# Patient Record
Sex: MALE | Race: WHITE | NOT HISPANIC OR LATINO | URBAN - METROPOLITAN AREA
[De-identification: names, ages, dates, MRNs, and addresses within clinical notes are randomized per-mention and may not be internally consistent; named-entity substitution may affect disease eponyms.]

---

## 2022-08-03 ENCOUNTER — INPATIENT (INPATIENT)
Facility: HOSPITAL | Age: 52
LOS: 3 days | Discharge: HOME | End: 2022-08-07
Attending: INTERNAL MEDICINE | Admitting: INTERNAL MEDICINE
Payer: COMMERCIAL

## 2022-08-03 VITALS
HEIGHT: 75 IN | SYSTOLIC BLOOD PRESSURE: 141 MMHG | RESPIRATION RATE: 16 BRPM | HEART RATE: 79 BPM | OXYGEN SATURATION: 100 % | TEMPERATURE: 98 F | DIASTOLIC BLOOD PRESSURE: 94 MMHG

## 2022-08-03 DIAGNOSIS — I63.9 CEREBRAL INFARCTION, UNSPECIFIED: ICD-10-CM

## 2022-08-03 LAB
ALBUMIN SERPL ELPH-MCNC: 4.2 G/DL — SIGNIFICANT CHANGE UP (ref 3.3–5)
ALP SERPL-CCNC: 72 U/L — SIGNIFICANT CHANGE UP (ref 40–120)
ALT FLD-CCNC: 30 U/L — SIGNIFICANT CHANGE UP (ref 4–41)
ANION GAP SERPL CALC-SCNC: 13 MMOL/L — SIGNIFICANT CHANGE UP (ref 7–14)
APTT BLD: 30.8 SEC — SIGNIFICANT CHANGE UP (ref 27–36.3)
AST SERPL-CCNC: 25 U/L — SIGNIFICANT CHANGE UP (ref 4–40)
BASOPHILS # BLD AUTO: 0.07 K/UL — SIGNIFICANT CHANGE UP (ref 0–0.2)
BASOPHILS NFR BLD AUTO: 0.6 % — SIGNIFICANT CHANGE UP (ref 0–2)
BILIRUB SERPL-MCNC: 0.5 MG/DL — SIGNIFICANT CHANGE UP (ref 0.2–1.2)
BLOOD GAS VENOUS COMPREHENSIVE RESULT: SIGNIFICANT CHANGE UP
BUN SERPL-MCNC: 18 MG/DL — SIGNIFICANT CHANGE UP (ref 7–23)
CALCIUM SERPL-MCNC: 8.6 MG/DL — SIGNIFICANT CHANGE UP (ref 8.4–10.5)
CHLORIDE SERPL-SCNC: 101 MMOL/L — SIGNIFICANT CHANGE UP (ref 98–107)
CK MB BLD-MCNC: 0.5 % — SIGNIFICANT CHANGE UP (ref 0–2.5)
CK MB CFR SERPL CALC: 2.9 NG/ML — SIGNIFICANT CHANGE UP
CK SERPL-CCNC: 601 U/L — HIGH (ref 30–200)
CO2 SERPL-SCNC: 22 MMOL/L — SIGNIFICANT CHANGE UP (ref 22–31)
CREAT SERPL-MCNC: 1.07 MG/DL — SIGNIFICANT CHANGE UP (ref 0.5–1.3)
EGFR: 84 ML/MIN/1.73M2 — SIGNIFICANT CHANGE UP
EOSINOPHIL # BLD AUTO: 0.21 K/UL — SIGNIFICANT CHANGE UP (ref 0–0.5)
EOSINOPHIL NFR BLD AUTO: 1.8 % — SIGNIFICANT CHANGE UP (ref 0–6)
FLUAV AG NPH QL: SIGNIFICANT CHANGE UP
FLUBV AG NPH QL: SIGNIFICANT CHANGE UP
GLUCOSE SERPL-MCNC: 138 MG/DL — HIGH (ref 70–99)
HCT VFR BLD CALC: 43.6 % — SIGNIFICANT CHANGE UP (ref 39–50)
HGB BLD-MCNC: 14.4 G/DL — SIGNIFICANT CHANGE UP (ref 13–17)
IANC: 8.48 K/UL — HIGH (ref 1.8–7.4)
IMM GRANULOCYTES NFR BLD AUTO: 0.7 % — SIGNIFICANT CHANGE UP (ref 0–1.5)
INR BLD: 1.05 RATIO — SIGNIFICANT CHANGE UP (ref 0.88–1.16)
LYMPHOCYTES # BLD AUTO: 1.6 K/UL — SIGNIFICANT CHANGE UP (ref 1–3.3)
LYMPHOCYTES # BLD AUTO: 14.1 % — SIGNIFICANT CHANGE UP (ref 13–44)
MCHC RBC-ENTMCNC: 28.7 PG — SIGNIFICANT CHANGE UP (ref 27–34)
MCHC RBC-ENTMCNC: 33 GM/DL — SIGNIFICANT CHANGE UP (ref 32–36)
MCV RBC AUTO: 86.9 FL — SIGNIFICANT CHANGE UP (ref 80–100)
MONOCYTES # BLD AUTO: 0.94 K/UL — HIGH (ref 0–0.9)
MONOCYTES NFR BLD AUTO: 8.3 % — SIGNIFICANT CHANGE UP (ref 2–14)
NEUTROPHILS # BLD AUTO: 8.48 K/UL — HIGH (ref 1.8–7.4)
NEUTROPHILS NFR BLD AUTO: 74.5 % — SIGNIFICANT CHANGE UP (ref 43–77)
NRBC # BLD: 0 /100 WBCS — SIGNIFICANT CHANGE UP
NRBC # FLD: 0 K/UL — SIGNIFICANT CHANGE UP
PLATELET # BLD AUTO: 193 K/UL — SIGNIFICANT CHANGE UP (ref 150–400)
POTASSIUM SERPL-MCNC: 3.5 MMOL/L — SIGNIFICANT CHANGE UP (ref 3.5–5.3)
POTASSIUM SERPL-SCNC: 3.5 MMOL/L — SIGNIFICANT CHANGE UP (ref 3.5–5.3)
PROT SERPL-MCNC: 7 G/DL — SIGNIFICANT CHANGE UP (ref 6–8.3)
PROTHROM AB SERPL-ACNC: 12.2 SEC — SIGNIFICANT CHANGE UP (ref 10.5–13.4)
RBC # BLD: 5.02 M/UL — SIGNIFICANT CHANGE UP (ref 4.2–5.8)
RBC # FLD: 12.8 % — SIGNIFICANT CHANGE UP (ref 10.3–14.5)
RSV RNA NPH QL NAA+NON-PROBE: SIGNIFICANT CHANGE UP
SARS-COV-2 RNA SPEC QL NAA+PROBE: SIGNIFICANT CHANGE UP
SODIUM SERPL-SCNC: 136 MMOL/L — SIGNIFICANT CHANGE UP (ref 135–145)
TOXICOLOGY SCREEN, DRUGS OF ABUSE, SERUM RESULT: SIGNIFICANT CHANGE UP
TROPONIN T, HIGH SENSITIVITY RESULT: 11 NG/L — SIGNIFICANT CHANGE UP
WBC # BLD: 11.38 K/UL — HIGH (ref 3.8–10.5)
WBC # FLD AUTO: 11.38 K/UL — HIGH (ref 3.8–10.5)

## 2022-08-03 PROCEDURE — 93010 ELECTROCARDIOGRAM REPORT: CPT

## 2022-08-03 PROCEDURE — 70498 CT ANGIOGRAPHY NECK: CPT | Mod: 26,MA

## 2022-08-03 PROCEDURE — 0042T: CPT

## 2022-08-03 PROCEDURE — 70496 CT ANGIOGRAPHY HEAD: CPT | Mod: 26,MA

## 2022-08-03 PROCEDURE — 99291 CRITICAL CARE FIRST HOUR: CPT | Mod: 25

## 2022-08-03 RX ORDER — SODIUM CHLORIDE 9 MG/ML
1000 INJECTION INTRAMUSCULAR; INTRAVENOUS; SUBCUTANEOUS ONCE
Refills: 0 | Status: COMPLETED | OUTPATIENT
Start: 2022-08-03 | End: 2022-08-03

## 2022-08-03 RX ORDER — ACETAMINOPHEN 500 MG
1000 TABLET ORAL ONCE
Refills: 0 | Status: COMPLETED | OUTPATIENT
Start: 2022-08-03 | End: 2022-08-03

## 2022-08-03 RX ORDER — ALTEPLASE 100 MG
8.8 KIT INTRAVENOUS ONCE
Refills: 0 | Status: COMPLETED | OUTPATIENT
Start: 2022-08-03 | End: 2022-08-03

## 2022-08-03 RX ORDER — METOCLOPRAMIDE HCL 10 MG
10 TABLET ORAL ONCE
Refills: 0 | Status: COMPLETED | OUTPATIENT
Start: 2022-08-03 | End: 2022-08-03

## 2022-08-03 RX ORDER — ONDANSETRON 8 MG/1
4 TABLET, FILM COATED ORAL ONCE
Refills: 0 | Status: DISCONTINUED | OUTPATIENT
Start: 2022-08-03 | End: 2022-08-03

## 2022-08-03 RX ORDER — ALTEPLASE 100 MG
78.9 KIT INTRAVENOUS ONCE
Refills: 0 | Status: COMPLETED | OUTPATIENT
Start: 2022-08-03 | End: 2022-08-03

## 2022-08-03 RX ADMIN — ALTEPLASE 528 MILLIGRAM(S): KIT at 22:56

## 2022-08-03 RX ADMIN — Medication 400 MILLIGRAM(S): at 23:08

## 2022-08-03 RX ADMIN — Medication 1000 MILLIGRAM(S): at 23:38

## 2022-08-03 RX ADMIN — Medication 10 MILLIGRAM(S): at 22:43

## 2022-08-03 RX ADMIN — ALTEPLASE 78.9 MILLIGRAM(S): KIT at 23:03

## 2022-08-03 RX ADMIN — SODIUM CHLORIDE 1000 MILLILITER(S): 9 INJECTION INTRAMUSCULAR; INTRAVENOUS; SUBCUTANEOUS at 22:43

## 2022-08-03 NOTE — STROKE CODE NOTE - ASSESSMENT/PLAN
Case discussed with MARY resident. 52M with h/o CVA 02/2022, septal defect, on Eliquis, last dose taken in AM with current INR 1.05.  LKN 9 p.m., L hand weakness, while playing piano ~8p.m.  Went back to baseline at 9 p.m.. In ED, symptoms recurred.  LKN 9p.m.  CT NEG, NIHSS2, but disabling speech.  tPA recommended if no exclusion criteria met.

## 2022-08-03 NOTE — ED ADULT NURSE NOTE - NSIMPLEMENTINTERV_GEN_ALL_ED
Implemented All Fall Risk Interventions:  [place] to call system. Call bell, personal items and telephone within reach. Instruct patient to call for assistance. Room bathroom lighting operational. Non-slip footwear when patient is off stretcher. Physically safe environment: no spills, clutter or unnecessary equipment. Stretcher in lowest position, wheels locked, appropriate side rails in place. Provide visual cue, wrist band, yellow gown, etc. Monitor gait and stability. Monitor for mental status changes and reorient to person, place, and time. Review medications for side effects contributing to fall risk. Reinforce activity limits and safety measures with patient and family.

## 2022-08-03 NOTE — ED PROVIDER NOTE - CRITICAL CARE ATTENDING CONTRIBUTION TO CARE
DR. PARKER, ATTENDING MD-  I personally saw the patient with the PA and performed a substantive portion of the visit including all aspects of the medical decision making.    51 y/o male h/o tia's with slow speech n/v lue weakness dec sensation x2 hours pta.  CODE CVA.  Obtain ct head cta h/n ct perf study neuro at bedside, tpa candidate.

## 2022-08-03 NOTE — ED ADULT NURSE REASSESSMENT NOTE - NS ED NURSE REASSESS COMMENT FT1
PT received TPA.  Initial dose given by Neuro Resident.  TPA infusion in place and tolerating well.  VSS.  No distress noted.  Neuro checks shows no acute changes.  IV Tylenol given for headache and tolerating well.  Will continue to monitor.

## 2022-08-03 NOTE — ED ADULT NURSE NOTE - OBJECTIVE STATEMENT
Facilitator RN" Pt coming to the ED as a Stroke Code. Pt A&Ox4 with episodes of confusion, ambulatory at baseline. Coming for AMS x 2 hours. As per family member, pt was at [employer] for music rehearsal, noticed pt acting out of the ordinary. [facility] patient was dropping thing and had a slurred speech. [facility] patient had expressive aphasia at baseline s/p CVA on February 2022. [facility] pt is on Eliquis and ASA. Upon assessment, pt actively vomiting, respirations are even and unlabored, neuro assessment as per flowsheet, pt able to follow commands, 18G IV LAC, labs sent, RVP collected, CLEMENTE Humphrey, Day, Neuro MD at CT area for evaluation. Safety precautions implemented as per protocol, awaiting further MD orders, will continue to monitor.

## 2022-08-03 NOTE — ED ADULT NURSE NOTE - CHIEF COMPLAINT QUOTE
pt p/w period of AMS x 2 hours, was at a rehearsal when others noticed he was dropping things and slurring speech. PMHx CVA in February 2022 on Eliquis and asa. baseline expressive aphasia, friend states pt speech is improved but still slower than usual

## 2022-08-03 NOTE — ED PROVIDER NOTE - NEUROLOGICAL, MLM
Alert and oriented, no focal deficits, no motor or sensory deficits. Alert and oriented.  LUE weakness dec sens

## 2022-08-03 NOTE — ED PROVIDER NOTE - NSICDXPASTMEDICALHX_GEN_ALL_CORE_FT
PAST MEDICAL HISTORY:  CVA (cerebrovascular accident)     History of TIAs     HLD (hyperlipidemia)

## 2022-08-03 NOTE — ED PROVIDER NOTE - CLINICAL SUMMARY MEDICAL DECISION MAKING FREE TEXT BOX
51 Y/O M PMH CVA and multiple TIA's, HLD, daytime tiredness on Adderall presents with an acute episode of confusion and poor coordination. Pt was doing a musical rehearsal at 8PM and was performing poorly and began to drop things and feel dizzy. Code stroke called, seen by Neuro, given TPA, will admit to the ICU for continued neuro checks, monitoring.

## 2022-08-03 NOTE — ED PROVIDER NOTE - OBJECTIVE STATEMENT
51 Y/O M PMH CVA and multiple TIA's, HLD, daytime tiredness on Adderall presents with an acute episode of confusion and poor coordination. Pt was doing a musical rehearsal at 8PM and was performing poorly and began to drop things and feel dizzy. Pt's wife came to see the pt and states he was confused picked up a bag of trash from the street thinking it was his belongings. Pt endorses a headache and nausea, denies any other sx or acute complaints.

## 2022-08-04 LAB
ALBUMIN SERPL ELPH-MCNC: 3.8 G/DL — SIGNIFICANT CHANGE UP (ref 3.3–5)
ALP SERPL-CCNC: 66 U/L — SIGNIFICANT CHANGE UP (ref 40–120)
ALT FLD-CCNC: 26 U/L — SIGNIFICANT CHANGE UP (ref 4–41)
ANION GAP SERPL CALC-SCNC: 13 MMOL/L — SIGNIFICANT CHANGE UP (ref 7–14)
APTT BLD: 27.3 SEC — SIGNIFICANT CHANGE UP (ref 27–36.3)
AST SERPL-CCNC: 26 U/L — SIGNIFICANT CHANGE UP (ref 4–40)
BASOPHILS # BLD AUTO: 0.04 K/UL — SIGNIFICANT CHANGE UP (ref 0–0.2)
BASOPHILS NFR BLD AUTO: 0.3 % — SIGNIFICANT CHANGE UP (ref 0–2)
BILIRUB SERPL-MCNC: 0.8 MG/DL — SIGNIFICANT CHANGE UP (ref 0.2–1.2)
BUN SERPL-MCNC: 13 MG/DL — SIGNIFICANT CHANGE UP (ref 7–23)
CALCIUM SERPL-MCNC: 8.4 MG/DL — SIGNIFICANT CHANGE UP (ref 8.4–10.5)
CHLORIDE SERPL-SCNC: 101 MMOL/L — SIGNIFICANT CHANGE UP (ref 98–107)
CO2 SERPL-SCNC: 21 MMOL/L — LOW (ref 22–31)
CREAT SERPL-MCNC: 0.88 MG/DL — SIGNIFICANT CHANGE UP (ref 0.5–1.3)
EGFR: 103 ML/MIN/1.73M2 — SIGNIFICANT CHANGE UP
EOSINOPHIL # BLD AUTO: 0.01 K/UL — SIGNIFICANT CHANGE UP (ref 0–0.5)
EOSINOPHIL NFR BLD AUTO: 0.1 % — SIGNIFICANT CHANGE UP (ref 0–6)
GLUCOSE SERPL-MCNC: 113 MG/DL — HIGH (ref 70–99)
HCT VFR BLD CALC: 39.7 % — SIGNIFICANT CHANGE UP (ref 39–50)
HGB BLD-MCNC: 13.5 G/DL — SIGNIFICANT CHANGE UP (ref 13–17)
IANC: 9.93 K/UL — HIGH (ref 1.8–7.4)
IMM GRANULOCYTES NFR BLD AUTO: 0.5 % — SIGNIFICANT CHANGE UP (ref 0–1.5)
INR BLD: 1.09 RATIO — SIGNIFICANT CHANGE UP (ref 0.88–1.16)
LYMPHOCYTES # BLD AUTO: 1.19 K/UL — SIGNIFICANT CHANGE UP (ref 1–3.3)
LYMPHOCYTES # BLD AUTO: 9.8 % — LOW (ref 13–44)
MAGNESIUM SERPL-MCNC: 1.9 MG/DL — SIGNIFICANT CHANGE UP (ref 1.6–2.6)
MCHC RBC-ENTMCNC: 28.8 PG — SIGNIFICANT CHANGE UP (ref 27–34)
MCHC RBC-ENTMCNC: 34 GM/DL — SIGNIFICANT CHANGE UP (ref 32–36)
MCV RBC AUTO: 84.8 FL — SIGNIFICANT CHANGE UP (ref 80–100)
MONOCYTES # BLD AUTO: 0.86 K/UL — SIGNIFICANT CHANGE UP (ref 0–0.9)
MONOCYTES NFR BLD AUTO: 7.1 % — SIGNIFICANT CHANGE UP (ref 2–14)
NEUTROPHILS # BLD AUTO: 9.93 K/UL — HIGH (ref 1.8–7.4)
NEUTROPHILS NFR BLD AUTO: 82.2 % — HIGH (ref 43–77)
NRBC # BLD: 0 /100 WBCS — SIGNIFICANT CHANGE UP
NRBC # FLD: 0 K/UL — SIGNIFICANT CHANGE UP
PCP SPEC-MCNC: SIGNIFICANT CHANGE UP
PHOSPHATE SERPL-MCNC: 3.8 MG/DL — SIGNIFICANT CHANGE UP (ref 2.5–4.5)
PLATELET # BLD AUTO: 167 K/UL — SIGNIFICANT CHANGE UP (ref 150–400)
POTASSIUM SERPL-MCNC: 3.4 MMOL/L — LOW (ref 3.5–5.3)
POTASSIUM SERPL-SCNC: 3.4 MMOL/L — LOW (ref 3.5–5.3)
PROT SERPL-MCNC: 6.6 G/DL — SIGNIFICANT CHANGE UP (ref 6–8.3)
PROTHROM AB SERPL-ACNC: 12.6 SEC — SIGNIFICANT CHANGE UP (ref 10.5–13.4)
RBC # BLD: 4.68 M/UL — SIGNIFICANT CHANGE UP (ref 4.2–5.8)
RBC # FLD: 13 % — SIGNIFICANT CHANGE UP (ref 10.3–14.5)
SODIUM SERPL-SCNC: 135 MMOL/L — SIGNIFICANT CHANGE UP (ref 135–145)
WBC # BLD: 12.09 K/UL — HIGH (ref 3.8–10.5)
WBC # FLD AUTO: 12.09 K/UL — HIGH (ref 3.8–10.5)

## 2022-08-04 PROCEDURE — 99292 CRITICAL CARE ADDL 30 MIN: CPT

## 2022-08-04 PROCEDURE — 70450 CT HEAD/BRAIN W/O DYE: CPT | Mod: 26,77

## 2022-08-04 PROCEDURE — 71045 X-RAY EXAM CHEST 1 VIEW: CPT | Mod: 26

## 2022-08-04 PROCEDURE — 99291 CRITICAL CARE FIRST HOUR: CPT | Mod: GC

## 2022-08-04 PROCEDURE — 93306 TTE W/DOPPLER COMPLETE: CPT | Mod: 26

## 2022-08-04 PROCEDURE — 70450 CT HEAD/BRAIN W/O DYE: CPT | Mod: 26

## 2022-08-04 RX ORDER — ASPIRIN/CALCIUM CARB/MAGNESIUM 324 MG
81 TABLET ORAL DAILY
Refills: 0 | Status: DISCONTINUED | OUTPATIENT
Start: 2022-08-04 | End: 2022-08-07

## 2022-08-04 RX ORDER — CHLORHEXIDINE GLUCONATE 213 G/1000ML
1 SOLUTION TOPICAL DAILY
Refills: 0 | Status: DISCONTINUED | OUTPATIENT
Start: 2022-08-04 | End: 2022-08-07

## 2022-08-04 RX ORDER — ATORVASTATIN CALCIUM 80 MG/1
80 TABLET, FILM COATED ORAL AT BEDTIME
Refills: 0 | Status: DISCONTINUED | OUTPATIENT
Start: 2022-08-04 | End: 2022-08-07

## 2022-08-04 RX ORDER — METOCLOPRAMIDE HCL 10 MG
10 TABLET ORAL ONCE
Refills: 0 | Status: COMPLETED | OUTPATIENT
Start: 2022-08-04 | End: 2022-08-04

## 2022-08-04 RX ORDER — POTASSIUM CHLORIDE 20 MEQ
10 PACKET (EA) ORAL
Refills: 0 | Status: COMPLETED | OUTPATIENT
Start: 2022-08-04 | End: 2022-08-04

## 2022-08-04 RX ORDER — ASPIRIN/CALCIUM CARB/MAGNESIUM 324 MG
81 TABLET ORAL DAILY
Refills: 0 | Status: DISCONTINUED | OUTPATIENT
Start: 2022-08-04 | End: 2022-08-04

## 2022-08-04 RX ORDER — ACETAMINOPHEN 500 MG
975 TABLET ORAL ONCE
Refills: 0 | Status: COMPLETED | OUTPATIENT
Start: 2022-08-04 | End: 2022-08-04

## 2022-08-04 RX ORDER — ACETAMINOPHEN 500 MG
1000 TABLET ORAL ONCE
Refills: 0 | Status: COMPLETED | OUTPATIENT
Start: 2022-08-04 | End: 2022-08-04

## 2022-08-04 RX ORDER — APIXABAN 2.5 MG/1
5 TABLET, FILM COATED ORAL EVERY 12 HOURS
Refills: 0 | Status: DISCONTINUED | OUTPATIENT
Start: 2022-08-05 | End: 2022-08-07

## 2022-08-04 RX ORDER — CHLORHEXIDINE GLUCONATE 213 G/1000ML
1 SOLUTION TOPICAL
Refills: 0 | Status: DISCONTINUED | OUTPATIENT
Start: 2022-08-04 | End: 2022-08-04

## 2022-08-04 RX ADMIN — Medication 975 MILLIGRAM(S): at 19:00

## 2022-08-04 RX ADMIN — Medication 1000 MILLIGRAM(S): at 10:00

## 2022-08-04 RX ADMIN — Medication 400 MILLIGRAM(S): at 09:18

## 2022-08-04 RX ADMIN — Medication 100 MILLIEQUIVALENT(S): at 13:42

## 2022-08-04 RX ADMIN — CHLORHEXIDINE GLUCONATE 1 APPLICATION(S): 213 SOLUTION TOPICAL at 12:37

## 2022-08-04 RX ADMIN — ATORVASTATIN CALCIUM 80 MILLIGRAM(S): 80 TABLET, FILM COATED ORAL at 21:19

## 2022-08-04 RX ADMIN — Medication 975 MILLIGRAM(S): at 18:30

## 2022-08-04 RX ADMIN — Medication 100 MILLIEQUIVALENT(S): at 14:22

## 2022-08-04 RX ADMIN — Medication 10 MILLIGRAM(S): at 02:57

## 2022-08-04 NOTE — PROGRESS NOTE ADULT - ATTENDING COMMENTS
Agree with above. Seen and examined with team on rounds. Critically ill post stroke and TPA. Unclear etiology of stroke, but has a history of ASD. Will repeat echo to evaluate the size of the ASD. Was on anticoagulation with Eliquis and ASA as an outpatient. MRI pending and repeat CT head pending. Speech and swallow eval at bedside. Hold off on DVT prophylaxis for 24 hours as last dose of Eliquis was last night. Neurology eval appreciated. Close follow up of headache and deficits.

## 2022-08-04 NOTE — CONSULT NOTE ADULT - ASSESSMENT
53yo L-handed man with a PMHx significant for CVA and TIAs (02/2022 s/p tPA, residual expressive aphasia), HLD, and a septal defect (on Eliquis) presents to the ED with difficulty using his L hand today. Neurology consulted as a code stroke. Symptoms onset 2000h, with resolution, and LKN 2100h with nausea, vomiting, headache, and worsening expressive aphasia.    In the ED code stroke was activated. NIHSS 2 (L hypoesthesia and aphasia). Pre-MRS 2. CTH showed no acute hemorrhage or pathology. CTA head/neck showed no LVO. CTP showed a core of 0ml and a R frontal penumbra of 7ml. tPA was administered at 1051h. Patient was not a candidate for thrombectomy due to no LVO.    Impression: Fluctuation of symptoms with weakness of the L hand, nausea, vomiting, headache, and L hypoesthesia due to R frontal ischemic CVA likely embolic in the setting of a septal defect.    Recommendations:  [] Admit to MICU  [] BP goal <180/105 per tpa protocol   [] Telemetry monitoring  [] MRI brain w/o contrast  [] CTH w/o contrast in 24hrs if unable to obtain MRI brain in time  [] TTE w/bubble. Pending results may need to consider MIMI.  [] Start ASA in 24 hours if repeat scans negative for hemorrhage  [] Atorvastatin 80mg (titrate to LDL < 70)  [] Mechanical DVT ppx. Start pharmacological DVT ppx in 24 hours if repeat scans negative for hemorrhage   [] Neuro checks and vital signs per tPA and unit protocol  [] PT/OT  [] S/S evaluation    Case discussed with telestroke attending Dr. Ramírez.  Case to be seen and discussed with stroke attending Dr. Castro. 53yo L-handed man with a PMHx significant for CVA and TIAs (02/2022 s/p tPA, residual expressive aphasia), HLD, and a septal defect (on Eliquis) presents to the ED with difficulty using his L hand today. Neurology consulted as a code stroke. Symptoms onset 2000h, with resolution, and LKN 2100h with nausea, vomiting, headache, and worsening expressive aphasia.    In the ED code stroke was activated. NIHSS 2 (L hypoesthesia and aphasia). Pre-MRS 2. CTH showed no acute hemorrhage or pathology. CTA head/neck showed no LVO. CTP showed a core of 0ml and a R frontal penumbra of 7ml. tPA was administered at 1051h. Patient was not a candidate for thrombectomy due to no LVO.    Impression: Fluctuation of symptoms with weakness of the L hand, nausea, vomiting, headache, and L hypoesthesia due to R frontal and parieto-occipital ischemic CVA likely embolic in the setting of a septal defect.    Recommendations:  [] Admit to MICU  [] BP goal <180/105 per tpa protocol   [] Telemetry monitoring  [] MRI brain w/o contrast  [] CTH w/o contrast in 24hrs if unable to obtain MRI brain in time  [] TTE w/bubble. Pending results may need to consider MIMI.  [] Start ASA in 24 hours if repeat scans negative for hemorrhage  [] Atorvastatin 80mg (titrate to LDL < 70)  [] Mechanical DVT ppx. Start pharmacological DVT ppx in 24 hours if repeat scans negative for hemorrhage   [] Neuro checks and vital signs per tPA and unit protocol  [] PT/OT  [] S/S evaluation    Case discussed with telestroke attending Dr. Ramírez.  Case to be seen and discussed with stroke attending Dr. Castro. 51yo L-handed man with a PMHx significant for CVA and TIAs (02/2022 s/p tPA, residual expressive aphasia), HLD, and a septal defect (on Eliquis) presents to the ED with difficulty using his L hand today. Neurology consulted as a code stroke. Symptoms onset 2000h, with resolution, and LKN 2100h with nausea, vomiting, headache, and worsening expressive aphasia.    In the ED code stroke was activated. NIHSS 2 (L hypoesthesia and aphasia). Pre-MRS 2. CTH showed no acute hemorrhage or pathology. CTA head/neck showed no LVO. CTP showed a core of 0ml, and a R frontal penumbra of 7ml and parieto-occipital of 4ml. tPA was administered at 1051h. Patient was not a candidate for thrombectomy due to no LVO.    Impression: Fluctuation of symptoms with weakness of the L hand, nausea, vomiting, headache, and L hypoesthesia due to R frontal and parieto-occipital ischemic CVA likely embolic in the setting of a septal defect.    Recommendations:  [] Admit to MICU  [] BP goal <180/105 per tpa protocol   [] Telemetry monitoring  [] MRI brain w/o contrast  [] CTH w/o contrast in 24hrs if unable to obtain MRI brain in time  [] TTE w/bubble. Pending results may need to consider MIMI.  [] Start ASA in 24 hours if repeat scans negative for hemorrhage  [] Atorvastatin 80mg (titrate to LDL < 70)  [] Mechanical DVT ppx. Start pharmacological DVT ppx in 24 hours if repeat scans negative for hemorrhage   [] Neuro checks and vital signs per tPA and unit protocol  [] PT/OT  [] S/S evaluation    Case discussed with telestroke attending Dr. Ramírez.  Case to be seen and discussed with stroke attending Dr. Castro.

## 2022-08-04 NOTE — H&P ADULT - HISTORY OF PRESENT ILLNESS
51yo PMH CVA and TIAs (02/06/2022 s/p tPA @ [place], residual expressive aphasia, ?L sided deficit now resolved), HLD, and a septal defect (on Eliquis) presents to the ED with difficulty using his L hand today.  At 2000h patient was at [employer] for a rehearsal playing the keyboard when he was noticed to be making mistakes. He then began having trouble using his L hand and was dropping items prompting people to call EMS. When EMS arrived around 2100h he was reportedly back to baseline. However, upon arrival to the hospital EMS noted a slowed nicanor in his speech and the patient began having nausea, vomiting, and a R-sided headache. He denies acute changes in vision, numbness, recent fever, illness, or fall.    In the ED code stroke was activated. NIHSS 2. Pre-MRS 2. CTH showed no acute hemorrhage or pathology. CTA head/neck showed no LVO. CTP showed a core of 0ml and a R frontal penumbra of 7ml and parieto-occipital of 4ml. Discussed risks and benefits of tPA with patient who verbalized understanding with repetition. Patient provided verbal consent and tPA was administered at 1051h. Patient was not a candidate for thrombectomy due to no LVO.

## 2022-08-04 NOTE — H&P ADULT - ASSESSMENT
#Neuro:  -  - Pain, analgesia, sedation  - Mobilization and activity    #CV:  -  - Shock, pressors, inotropes    #Resp:  -Saturating well on room air, no supplemental O2 requirements    #GI:  - Diet and Nutrition:  - Stooling:  - Stress ulcer ppx    #Renal:  -  - Fluids, fluid balance; IVF and UOP  - Electrolytes and imbalances, replacement  - Acid-base status and issues; on bicarb?    #Heme:  s/p tPA      #ID:  - Active  - Resolved  - Micro results: blood/urine/sputum cx, stains, assays (C diff, legionella)  - Abx: current, prior, planned duration    #Endo:  -  - Glucose control  - Adrenal insufficiency?    #Skin:  - Decub?    #Trauma/Surgery/MSK/Ortho:    #Access/Lines/Tubes/Drains:  -    #Social/Ethics:  - Code status    #Dispo:  -      52M PMH as above here for L sided weakness, hypoesthesia and dysarthria found to have weakness of the L hand, due to R frontal and parieto-occipital ischemic CVA likely embolic in the setting of a septal defect.      #Neuro:  R frontal and parieto-occipital ischemic CVA likely embolic in the setting of a septal defect  [] MRI brain w/o contrast  [] TTE w/bubble     [] Neuro checks q4h  [] PT/OT    #CV:  Known ASD  - TTE with bubble study  - Patient currently hemodynamically stable, will CTM and add on support as needed  - Atorvastatin 80mg (titrate to LDL < 70)    #Resp:  -Saturating well on room air, no supplemental O2 requirements    #GI:  - Diet and Nutrition: Pending S/S evaluation    #Renal:  - No active issues currently    #Heme:  s/p tPA  [ ] Mechanical DVT ppx. Start pharmacological DVT ppx in 24 hours,.  [ ] Start ASA in 24 hours if repeat scans negative for hemorrhage    #ID:  - No active issues    #Endo:  - No active issues    #Access/Lines/Tubes/Drains:  PIVs    #Social/Ethics:  - Code status: Full code    #Dispo:  -

## 2022-08-04 NOTE — PHYSICAL THERAPY INITIAL EVALUATION ADULT - PERTINENT HX OF CURRENT PROBLEM, REHAB EVAL
53yo PMH CVA and TIAs (02/06/2022 s/p tPA @ [facility], residual expressive aphasia, ?L sided deficit now resolved), HLD, and a septal defect (on Eliquis) presents to the ED with difficulty using his L hand today.  At 2000h patient was at [employer] for a rehearsal playing the keyboard when he was noticed to be making mistakes.

## 2022-08-04 NOTE — OCCUPATIONAL THERAPY INITIAL EVALUATION ADULT - ANTICIPATED DISCHARGE DISPOSITION, OT EVAL
TBD pending assessment of functional abilities when appropriate. Patient on bedrest s/p tPA. OT to continue to follow.

## 2022-08-04 NOTE — CONSULT NOTE ADULT - ATTENDING COMMENTS
VASCULAR NEUROLOGY ATTENDING  The patient is seen and examined the history and imaging are reviewed. I agree with the resident note unless otherwise noted. In brief this is a 52 year old man L-handed man with pior stroke (02/2022 s/p tPA, residual expressive aphasia), HLD, and a septal defect (on Eliquis). On 8/3/22 presents to the ED with difficulty using his L hand today. CTH showed no acute hemorrhage or pathology. CTA head/neck showed no LVO. CTP showed a core of 0ml, and a R frontal penumbra of 7ml and parieto-occipital of 4ml. tPA was administered at.  Patient was not a candidate for thrombectomy due to no LVO. On exam this AM patient appears grossly at his baseline. Will repeat CT head today given HA (present from prior). Post tpa care. MRI/A. Team to clarify indication for AC. Will continue for now once CT head and MRI are preformed. TTE. PT/OT/SS

## 2022-08-04 NOTE — PHYSICAL THERAPY INITIAL EVALUATION ADULT - GENERAL OBSERVATIONS, REHAB EVAL
Pt received in bed all lines intact NAD all precautions observed RN made aware, call bell left within reach, PT will continue to follow.

## 2022-08-04 NOTE — PATIENT PROFILE ADULT - FALL HARM RISK - HARM RISK INTERVENTIONS
Assistance with ambulation/Assistance OOB with selected safe patient handling equipment/Communicate Risk of Fall with Harm to all staff/Reinforce activity limits and safety measures with patient and family/Review medications for side effects contributing to fall risk/Sit up slowly, dangle for a short time, stand at bedside before walking/Tailored Fall Risk Interventions/Toileting schedule using arm’s reach rule for commode and bathroom/Visual Cue: Yellow wristband and red socks/Bed in lowest position, wheels locked, appropriate side rails in place/Call bell, personal items and telephone in reach/Instruct patient to call for assistance before getting out of bed or chair/Non-slip footwear when patient is out of bed/[place] to call system/Physically safe environment - no spills, clutter or unnecessary equipment/Purposeful Proactive Rounding/Room/bathroom lighting operational, light cord in reach

## 2022-08-04 NOTE — SWALLOW BEDSIDE ASSESSMENT ADULT - SWALLOW EVAL: DIAGNOSIS
1) Functional oral stage of swallow for regular solids, puree, and thin fluids marked by adequate oral containment, bolus manipulation, mastication, and coordination.  Anterior to posterior oral transit/transfer noted. Adequate oral clearance observed. 2) Functional pharyngeal stage of swallow for regular solids, puree, and thin fluids marked by initiation of pharyngeal swallow trigger and hyolaryngeal excursion noted upon digital palpation. No overt signs/symptoms of penetration/aspiration noted across consistencies.

## 2022-08-04 NOTE — PROGRESS NOTE ADULT - SUBJECTIVE AND OBJECTIVE BOX
Progress Note    08-03-22 (1d)    Patient is a 52y old  Male who presents with a chief complaint of     Subjective / Interval Events :  - Arrived as stroke code s/p TPA 8/3 ~11pm, admitted to MICU for q1h neuro checks  - Pt seen and examined at bedside this AM. Still reporting HA- states it's the same HA that he came in with, prior to TPA. No acute worsening  - Repeat CTH obtained. IV tylenol for pain     Additional ROS (if any):    MEDICATIONS  (STANDING):  atorvastatin 80 milliGRAM(s) Oral at bedtime  chlorhexidine 2% Cloths 1 Application(s) Topical daily    MEDICATIONS  (PRN):      CAPILLARY BLOOD GLUCOSE      POCT Blood Glucose.: 128 mg/dL (03 Aug 2022 22:02)    I&O's Summary    03 Aug 2022 07:01  -  04 Aug 2022 07:00  --------------------------------------------------------  IN: 0 mL / OUT: 400 mL / NET: -400 mL    04 Aug 2022 07:01  -  04 Aug 2022 11:44  --------------------------------------------------------  IN: 0 mL / OUT: 400 mL / NET: -400 mL        PHYSICAL EXAM:  Vital Signs Last 24 Hrs  T(C): 37.1 (04 Aug 2022 08:00), Max: 37.6 (04 Aug 2022 02:14)  T(F): 98.8 (04 Aug 2022 08:00), Max: 99.7 (04 Aug 2022 04:00)  HR: 74 (04 Aug 2022 11:00) (69 - 97)  BP: 128/78 (04 Aug 2022 11:00) (105/63 - 157/91)  BP(mean): 91 (04 Aug 2022 11:00) (73 - 102)  RR: 21 (04 Aug 2022 11:00) (14 - 28)  SpO2: 96% (04 Aug 2022 11:00) (94% - 100%)    Parameters below as of 04 Aug 2022 07:00  Patient On (Oxygen Delivery Method): room air      General: NAD, non-toxic appearing   HEENT: EOMi, no scleral icterus  CV: RRR, normal S1 and S2, no m/r/g  Lungs: normal respiratory effort. CTAB  Abd: soft, nontender, nondistended  Ext: no edema, 2+ peripheral pulses   Pysch: AAOx3, appropriate affect  Neuro: grossly non-focal, moving all extremities spontaneously. speech mildly slowed but intelligible    Skin: no rashes or lesions     LABS:                          13.5   12.09 )-----------( 167      ( 04 Aug 2022 06:10 )             39.7       WBC Trend: 12.09<--, 11.38<--  Hb Trend: 13.5<--, 14.4<--    08-04    135  |  101  |  13  ----------------------------<  113<H>  3.4<L>   |  21<L>  |  0.88    Ca    8.4      04 Aug 2022 06:10  Phos  3.8     08-04  Mg     1.90     08-04    TPro  6.6  /  Alb  3.8  /  TBili  0.8  /  DBili  x   /  AST  26  /  ALT  26  /  AlkPhos  66  08-04    PT/INR - ( 04 Aug 2022 06:10 )   PT: 12.6 sec;   INR: 1.09 ratio         PTT - ( 04 Aug 2022 06:10 )  PTT:27.3 sec  CARDIAC MARKERS ( 03 Aug 2022 22:17 )  x     / x     / 601 U/L / x     / 2.9 ng/mL          Blood Gas Venous Comprehensive Result: Performed In Lab (08-03-22 @ 22:17)      RADIOLOGY & ADDITIONAL TESTS: Reviewed  < from: CT Head No Cont (08.04.22 @ 11:29) >    ACC: [id number] EXAM:  CT BRAIN                          PROCEDURE DATE:  08/04/2022          INTERPRETATION:  Clinical indication: Status post TPA.    Multiple axial sections were performed from base skull to vertex without   contrast enhancement. Coronal and sagittal reconstructions were performed   as well    This exam is compared prior head CT performed on August 3, 2022.    Parenchymal volume loss and chronic microvascular ischemic changes again   seen.    Stable area of calcification involving the central portion alexis is   identified.    There is no acute hemorrhage mass or mass effect seen.    Evaluation of the osseous structures with the appropriate window appears   unremarkable    Bilateral ethmoid sinus mucosal thickening is seen. Air-fluid levels   involving both frontal sinuses are seen.    IMPRESSION: Stable exam.    --- End of Report ---            TA DANIEL MD; Attending Radiologist  This document has been electronically signed. Aug  4 2022 11:43AM    < end of copied text >

## 2022-08-04 NOTE — H&P ADULT - NSHPLABSRESULTS_GEN_ALL_CORE
LABS:                         13.5   12.09 )-----------( 167      ( 04 Aug 2022 06:10 )             39.7     08-03    136  |  101  |  18  ----------------------------<  138<H>  3.5   |  22  |  1.07    Ca    8.6      03 Aug 2022 22:17    TPro  7.0  /  Alb  4.2  /  TBili  0.5  /  DBili  x   /  AST  25  /  ALT  30  /  AlkPhos  72  08-03    PT/INR - ( 04 Aug 2022 06:10 )   PT: 12.6 sec;   INR: 1.09 ratio         PTT - ( 04 Aug 2022 06:10 )  PTT:27.3 sec    CARDIAC MARKERS ( 03 Aug 2022 22:17 )  x     / x     / 601 U/L / x     / 2.9 ng/mL            RADIOLOGY, EKG & ADDITIONAL TESTS:

## 2022-08-04 NOTE — PHYSICAL THERAPY INITIAL EVALUATION ADULT - ADDITIONAL COMMENTS
Fully independent prior to admission to hospital.  No history of falls.  Pt lives in an apartment building with a staircase to get to his apartment.  Pt does not use DME

## 2022-08-04 NOTE — OCCUPATIONAL THERAPY INITIAL EVALUATION ADULT - DIAGNOSIS, OT EVAL
s/p left sided weakness, s/p aphasia, s/p tPA, s/p right frontal and parieto-occipital CVA; decreased functional mobility, decreased ADL performance

## 2022-08-04 NOTE — H&P ADULT - NSHPPHYSICALEXAM_GEN_ALL_CORE
Gen: WDWN, NAD, sleepy  HEENT: EOMI, no nasal discharge, mucous membranes moist  CV: RRR, +S1/S2, no M/R/G  Resp: CTAB, no W/R/R  GI: Abdomen soft non-distended, NTTP, no masses  MSK: No open wounds, no bruising, no LE edema  Neuro: CN2-12 grossly intact however exam slightly limited as patient is tired, A&Ox4, MS +5/5 in UE and LE b/l however slightly diminished in the L compared to the right, finger to nose smooth and rapid, gross sensation intact in UE and LE BL, slight L sided pronator drift  Psych: appropriate mood, denies AH, VH, SI

## 2022-08-04 NOTE — OCCUPATIONAL THERAPY INITIAL EVALUATION ADULT - GENERAL OBSERVATIONS, REHAB EVAL
Patient received semisupine in bed in NAD and agreeable to participate in OT evaluation. +tele. A&Ox4. Patient currently on bedrest s/p tPA administration.

## 2022-08-04 NOTE — H&P ADULT - ATTENDING COMMENTS
53 yo man with h/o septal defect on Eliquis, TIAs and CVA (got tPA 2/2022, residual expressive aphasia) presented with h/o waxing and waning sxs of L sided weakness and dysarthria/worsened expressive aphasia.  CT head showed R frontal penumbra, no hemorrhage.  Pt coags wnl.  He was given tPA at 0105 today.  /91  Afeb  HR 95  100% on RA  Pt is awake, alert, in NAD, oriented x3, follows simple commands, some L hand weakness and diminished sensation on L.  Likely R frontal CVA, likely embolic given septal defect.  Will need DVT study, TTE  q1h neuro checks, repeat CT 24hours or sooner for change in neuro exam.  Keep BP<180/105  Atorvastatin and ASA per neuro recs  DVT ppx SCD's for now  Not in shock state  GOC full code

## 2022-08-04 NOTE — SWALLOW BEDSIDE ASSESSMENT ADULT - COMMENTS
Per MICU note 8/3 "52M PMH multiple CVA and TIAs on ASA and Eliquis (last 02/06/2022 s/p tPA @ [facility], residual expressive aphasia, ?L sided deficit now resolved), HLD, and a known atrial septal defect here for L sided weakness, hypoesthesia and aphasia found to have weakness of the L hand, due to R frontal and parieto-occipital ischemic CVA s/p TPA ~23:00 8/3, admitted to MICU for q1 neuro checks.   Neuro:  #R frontal and parieto-occipital ischemic CVA  h/o multiple CVA's and TIA's iso known ASD. Concern for embolic etiology, however, per pt follows private cardiologist, deemed ASD too small. On ASA and Eliquis at home   - s/p TPA ~23:00 8/3".    CXR 8/4 "Clear lungs."    Patient seen at bedside, awake/alert and oriented, during clinical swallow evaluation this PM. Patient able to follow directions and answer questions appropriately. Patient denied difficulties swallowing and denied odynophagia. Patient was cooperative and accepted all PO trials.

## 2022-08-04 NOTE — PHYSICAL THERAPY INITIAL EVALUATION ADULT - LEVEL OF INDEPENDENCE: SIT/STAND, REHAB EVAL
Pt currently on bedrest orders (Recent administration of tPA).  Pt appears competent to ambulate and perform functional activity/unable to perform

## 2022-08-04 NOTE — SWALLOW BEDSIDE ASSESSMENT ADULT - ADDITIONAL RECOMMENDATIONS
1) Monitor for any neurological changes that may impact PO intake. 2) Reconsult this service as needed.

## 2022-08-04 NOTE — OCCUPATIONAL THERAPY INITIAL EVALUATION ADULT - PERTINENT HX OF CURRENT PROBLEM, REHAB EVAL
52 year old male with history of multiple CVA and TIAs (last 02/06/2022 s/p tPA @ [facility], residual expressive aphasia, ?L sided deficit now resolved), HLD, and a known atrial septal defect here for left sided weakness, hypoesthesia and aphasia\ due to right frontal and parieto-occipital ischemic CVA s/p TPA ~23:00 on 8/3, admitted to MICU for q1 neuro checks.

## 2022-08-04 NOTE — PROGRESS NOTE ADULT - ASSESSMENT
52M PMH as above here for L sided weakness, hypoesthesia and dysarthria found to have weakness of the L hand, due to R frontal and parieto-occipital ischemic CVA likely embolic in the setting of a septal defect.      #Neuro:  R frontal and parieto-occipital ischemic CVA likely embolic in the setting of a septal defect  [] MRI brain w/o contrast  [] TTE w/bubble     [] Neuro checks q4h  [] PT/OT    #CV:  Known ASD  - TTE with bubble study  - Patient currently hemodynamically stable, will CTM and add on support as needed  - Atorvastatin 80mg (titrate to LDL < 70)    #Resp:  -Saturating well on room air, no supplemental O2 requirements    #GI:  - Diet and Nutrition: Pending S/S evaluation    #Renal:  - No active issues currently    #Heme:  s/p tPA  [ ] Mechanical DVT ppx. Start pharmacological DVT ppx in 24 hours,.  [ ] Start ASA in 24 hours if repeat scans negative for hemorrhage    #ID:  - No active issues    #Endo:  - No active issues    #Access/Lines/Tubes/Drains:  PIVs    #Social/Ethics:  - Code status: Full code    #Dispo:  -      52M PMH multiple CVA and TIAs on ASA and Eliquis (last 02/06/2022 s/p tPA @ [facility], residual expressive aphasia, ?L sided deficit now resolved), HLD, and a known atrial septal defect here for L sided weakness, hypoesthesia and aphasia found to have weakness of the L hand, due to R frontal and parieto-occipital ischemic CVA s/p TPA ~23:00 8/3, admitted to MICU for q1 neuro checks.     Neuro:  #R frontal and parieto-occipital ischemic CVA  h/o multiple CVA's and TIA's iso known ASD. Concern for embolic etiology, however, per pt follows private cardiologist, deemed ASD too small. On ASA and Eliquis at home   - s/p TPA ~23:00 8/3  - repeat CTH was performed 11:11 8/4 for HA (stable from presentation/prior to TPA) - stable   - repeat CTH 24hrs post TPA  - q1h neuro checks  - pending MRI brain w/o contrast  - pending TTE w/ bubble   - PT/OT/Speech & swallow   - appreciate neuro recs     CV:  #Known ASD  Follows outpatient cardiologist, deemed too small to be cause of previous multiple CVA/TIAs, on ASA and Eliquis   - TTE with bubble study  - atorvastatin 80mg  - cardiology c/s after TTE   - hold antiplatelets/AC until 24h repeat CTH     Resp:  - No active issues. Saturating well on room air, no supplemental O2 requirements    GI:  #Diet and Nutrition: Pending S/S evaluation    Renal:  - No active issues currently. SCr wnl    Heme:  - no active issues. H/H stable  #DVT ppx: home Eliquis and ASA held given post-TPA. resume after 24h if repeat CTH stable     ID:  - No active issues    Endo:  - No active issues    Access/Lines/Tubes/Drains:  - PIVs    Social/Ethics:  - Code status: Full code    DISPO:  - transfer to telemetry after 24hrs post-TPA of q1h neuro checks

## 2022-08-04 NOTE — CONSULT NOTE ADULT - SUBJECTIVE AND OBJECTIVE BOX
Neurology - Consult Note    -  Spectra: [phone] ([facility]), [phone] ([facility])  -    HPI: Patient TAMI DUNHAM is a 51yo L-handed man with a PMHx significant for CVA and TIAs (02/2022 s/p tPA, residual expressive aphasia), HLD, and a septal defect (on Eliquis) presents to the ED with difficulty using his L hand today. Neurology consulted as a code stroke. At 2000h patient was at [employer] for a rehearsal playing the keyboard when he was noticed to be making mistakes. He then began having trouble using his L hand and was dropping items prompting people to call EMS. When EMS arrived around 2100h he was reportedly back to baseline. However, upon arrival to the hospital EMS noted a slowed nicanor in his speech and the patient began having nausea, vomiting, and a R-sided headache. He denies acute changes in vision, numbness, recent fever, illness, or fall.    In the ED code stroke was activated. NIHSS 2. Pre-MRS 2. CTH showed no acute hemorrhage or pathology. CTA head/neck showed no LVO. CTP showed a core of 0ml and a R frontal penumbra of 7ml. Discussed risks and benefits of tPA with patient who verbalized understanding with repetition. Patient provided verbal consent and tPA was administered at 1051h. Patient was not a candidate for thrombectomy due to no LVO.      Review of Systems:  CONSTITUTIONAL: No fevers or chills  EYES AND ENT: No visual changes or no throat pain   NECK: No pain or stiffness  RESPIRATORY: No hemoptysis or shortness of breath  CARDIOVASCULAR: No chest pain or palpitations  GASTROINTESTINAL: +Nausea and vomiting  GENITOURINARY: No dysuria or hematuria  NEUROLOGICAL: +As stated in HPI above  All other review of systems is negative unless indicated above.      PMHx/PSHx/Family Hx: As above, otherwise see below   History of TIAs    CVA (cerebrovascular accident)    HLD (hyperlipidemia)      Vitals:  T(C): 37.1 (08-03-22 @ 23:50), Max: 37.1 (08-03-22 @ 23:50)  HR: 84 (08-03-22 @ 23:50) (77 - 84)  BP: 129/80 (08-03-22 @ 23:50) (129/80 - 150/91)  RR: 17 (08-03-22 @ 23:50) (16 - 18)  SpO2: 99% (08-03-22 @ 23:50) (97% - 100%)    Physical Examination:  General - NAD  Cardiovascular - Peripheral pulses palpable, no edema  Eyes - Fundoscopy not performed due to safety precautions in the setting of the COVID-19 pandemic    Neurologic Exam:  Mental status - Awake, Alert, Oriented to person, place, and time (August 2022). Speech occasionally with pauses and word searching, impaired repetition and naming intact. Follows simple commands but occasionally requires repetition to accurately perform tasks, able to perform cross command. Chronic memory deficits    Cranial nerves - PERRLA, VFF, EOMI, face sensation (V1-V3) intact b/l, facial strength intact without asymmetry b/l, hearing intact b/l, palate with symmetric elevation, trapezius OR sternocleidomastiod 5/5 strength b/l, tongue midline on protrusion with full lateral movement    Motor - Moving all extremities antigravity against resistance with no motor drift. Normal bulk and tone throughout. Clumsy L hand but no sustained pronator drift.    Sensation - Diminished to light touch throughout the L extremities, intact to pinprick. Intact to light touch and pinprick throughout R extremities.    DTR's -             Biceps      Triceps     Brachioradialis      Patellar    Ankle    Toes/plantar response  R             3+             3+                  3+                       3+            3+                 Equivocal  L              3+             3+                 3+                        3+           3+                 Equivocal    Coordination - Finger to Nose intact b/l albeit with slight difficulty following command on the L. No tremors appreciated    Gait and station - Deferred due to repeated ongoing emesis    Labs:                        14.4   11.38 )-----------( 193      ( 03 Aug 2022 22:17 )             43.6     08-03    136  |  101  |  18  ----------------------------<  138<H>  3.5   |  22  |  1.07    Ca    8.6      03 Aug 2022 22:17    TPro  7.0  /  Alb  4.2  /  TBili  0.5  /  DBili  x   /  AST  25  /  ALT  30  /  AlkPhos  72  08-03    CAPILLARY BLOOD GLUCOSE  POCT Blood Glucose.: 128 mg/dL (03 Aug 2022 22:02)    LIVER FUNCTIONS - ( 03 Aug 2022 22:17 )  Alb: 4.2 g/dL / Pro: 7.0 g/dL / ALK PHOS: 72 U/L / ALT: 30 U/L / AST: 25 U/L / GGT: x             PT/INR - ( 03 Aug 2022 22:17 )   PT: 12.2 sec;   INR: 1.05 ratio    PTT - ( 03 Aug 2022 22:17 )  PTT:30.8 sec        Radiology:  CT Angio Neck w/ IV Cont (08.03.22 @ 22:35)  IMPRESSION:    CT PERFUSION: No evidence of core infarct. Focal hypoperfusion 7 cc in   the right frontal region, 4 cc in the right parieto-occipital region.    CTA BRAIN:Mild venous contamination. Question adjacent vein versus   mildly irregular early M2 branch at the superior aspect of the right M1   midsegment. No proximal large vessel occlusion or high-grade stenosis.    CTA NECK: Patent cervical vasculature. No flow limiting stenosis or   occlusion. Neurology - Consult Note    -  Spectra: [phone] ([facility]), [phone] ([facility])  -    HPI: Patient TAMI DUNHAM is a 51yo L-handed man with a PMHx significant for CVA and TIAs (02/2022 s/p tPA, residual expressive aphasia), HLD, and a septal defect (on Eliquis) presents to the ED with difficulty using his L hand today. Neurology consulted as a code stroke. At 2000h patient was at [employer] for a rehearsal playing the keyboard when he was noticed to be making mistakes. He then began having trouble using his L hand and was dropping items prompting people to call EMS. When EMS arrived around 2100h he was reportedly back to baseline. However, upon arrival to the hospital EMS noted a slowed nicanor in his speech and the patient began having nausea, vomiting, and a R-sided headache. He denies acute changes in vision, numbness, recent fever, illness, or fall.    In the ED code stroke was activated. NIHSS 2. Pre-MRS 2. CTH showed no acute hemorrhage or pathology. CTA head/neck showed no LVO. CTP showed a core of 0ml and a R frontal penumbra of 7ml. Discussed risks and benefits of tPA with patient who verbalized understanding with repetition. Patient provided verbal consent and tPA was administered at 1051h. Patient was not a candidate for thrombectomy due to no LVO.      Review of Systems:  CONSTITUTIONAL: No fevers or chills  EYES AND ENT: No visual changes or no throat pain   NECK: No pain or stiffness  RESPIRATORY: No hemoptysis or shortness of breath  CARDIOVASCULAR: No chest pain or palpitations  GASTROINTESTINAL: +Nausea and vomiting  GENITOURINARY: No dysuria or hematuria  NEUROLOGICAL: +As stated in HPI above  All other review of systems is negative unless indicated above.      PMHx/PSHx/Family Hx: As above, otherwise see below   History of TIAs    CVA (cerebrovascular accident)    HLD (hyperlipidemia)      Vitals:  T(C): 37.1 (08-03-22 @ 23:50), Max: 37.1 (08-03-22 @ 23:50)  HR: 84 (08-03-22 @ 23:50) (77 - 84)  BP: 129/80 (08-03-22 @ 23:50) (129/80 - 150/91)  RR: 17 (08-03-22 @ 23:50) (16 - 18)  SpO2: 99% (08-03-22 @ 23:50) (97% - 100%)    Physical Examination:  General - NAD  Cardiovascular - Peripheral pulses palpable, no edema  Eyes - Fundoscopy not performed due to safety precautions in the setting of the COVID-19 pandemic    Neurologic Exam:  Mental status - Awake, Alert, Oriented to person, place, and time (August 2022). Speech occasionally with pauses and word searching, impaired repetition and naming intact. Follows simple commands but occasionally requires repetition to accurately perform tasks, able to perform cross command. Chronic memory deficits    Cranial nerves - PERRLA, VFF, EOMI, face sensation (V1-V3) intact b/l, facial strength intact without asymmetry b/l, hearing intact b/l, palate with symmetric elevation, trapezius OR sternocleidomastiod 5/5 strength b/l, tongue midline on protrusion with full lateral movement    Motor - Moving all extremities antigravity against resistance with no motor drift. Normal bulk and tone throughout. Clumsy L hand but no sustained pronator drift.    Sensation - Diminished to light touch throughout the L extremities, intact to pinprick. Intact to light touch and pinprick throughout R extremities.    DTR's -             Biceps      Triceps     Brachioradialis      Patellar    Ankle    Toes/plantar response  R             3+             3+                  3+                       3+            3+                 Equivocal  L              3+             3+                 3+                        3+           3+                 Equivocal    Coordination - Finger to Nose intact b/l albeit with slight difficulty following command on the L. No tremors appreciated    Gait and station - Deferred due to repeated ongoing emesis    Labs:                        14.4   11.38 )-----------( 193      ( 03 Aug 2022 22:17 )             43.6     08-03    136  |  101  |  18  ----------------------------<  138<H>  3.5   |  22  |  1.07    Ca    8.6      03 Aug 2022 22:17    TPro  7.0  /  Alb  4.2  /  TBili  0.5  /  DBili  x   /  AST  25  /  ALT  30  /  AlkPhos  72  08-03    CAPILLARY BLOOD GLUCOSE  POCT Blood Glucose.: 128 mg/dL (03 Aug 2022 22:02)    LIVER FUNCTIONS - ( 03 Aug 2022 22:17 )  Alb: 4.2 g/dL / Pro: 7.0 g/dL / ALK PHOS: 72 U/L / ALT: 30 U/L / AST: 25 U/L / GGT: x             PT/INR - ( 03 Aug 2022 22:17 )   PT: 12.2 sec;   INR: 1.05 ratio    PTT - ( 03 Aug 2022 22:17 )  PTT:30.8 sec        Radiology:  CT Brain Stroke Protocol (08.03.22 @ 22:34)  IMPRESSION:    No CT evidence of acute intracranial hemorrhage, brain edema,or mass   effect.      CT Angio Neck w/ IV Cont (08.03.22 @ 22:35)  IMPRESSION:    CT PERFUSION: No evidence of core infarct. Focal hypoperfusion 7 cc in   the right frontal region, 4 cc in the right parieto-occipital region.    CTA BRAIN:Mild venous contamination. Question adjacent vein versus   mildly irregular early M2 branch at the superior aspect of the right M1   midsegment. No proximal large vessel occlusion or high-grade stenosis.    CTA NECK: Patent cervical vasculature. No flow limiting stenosis or   occlusion. Neurology - Consult Note    -  Spectra: [phone] ([facility]), [phone] ([facility])  -    HPI: Patient TAMI DUNHAM is a 53yo L-handed man with a PMHx significant for CVA and TIAs (02/2022 s/p tPA, residual expressive aphasia), HLD, and a septal defect (on Eliquis) presents to the ED with difficulty using his L hand today. Neurology consulted as a code stroke. At 2000h patient was at [employer] for a rehearsal playing the keyboard when he was noticed to be making mistakes. He then began having trouble using his L hand and was dropping items prompting people to call EMS. When EMS arrived around 2100h he was reportedly back to baseline. However, upon arrival to the hospital EMS noted a slowed nicanor in his speech and the patient began having nausea, vomiting, and a R-sided headache. He denies acute changes in vision, numbness, recent fever, illness, or fall.    In the ED code stroke was activated. NIHSS 2. Pre-MRS 2. CTH showed no acute hemorrhage or pathology. CTA head/neck showed no LVO. CTP showed a core of 0ml and a R frontal penumbra of 7ml and parieto-occipital of 4ml. Discussed risks and benefits of tPA with patient who verbalized understanding with repetition. Patient provided verbal consent and tPA was administered at 1051h. Patient was not a candidate for thrombectomy due to no LVO.      Review of Systems:  CONSTITUTIONAL: No fevers or chills  EYES AND ENT: No visual changes or no throat pain   NECK: No pain or stiffness  RESPIRATORY: No hemoptysis or shortness of breath  CARDIOVASCULAR: No chest pain or palpitations  GASTROINTESTINAL: +Nausea and vomiting  GENITOURINARY: No dysuria or hematuria  NEUROLOGICAL: +As stated in HPI above  All other review of systems is negative unless indicated above.      PMHx/PSHx/Family Hx: As above, otherwise see below   History of TIAs    CVA (cerebrovascular accident)    HLD (hyperlipidemia)      Vitals:  T(C): 37.1 (08-03-22 @ 23:50), Max: 37.1 (08-03-22 @ 23:50)  HR: 84 (08-03-22 @ 23:50) (77 - 84)  BP: 129/80 (08-03-22 @ 23:50) (129/80 - 150/91)  RR: 17 (08-03-22 @ 23:50) (16 - 18)  SpO2: 99% (08-03-22 @ 23:50) (97% - 100%)    Physical Examination:  General - NAD  Cardiovascular - Peripheral pulses palpable, no edema  Eyes - Fundoscopy not performed due to safety precautions in the setting of the COVID-19 pandemic    Neurologic Exam:  Mental status - Awake, Alert, Oriented to person, place, and time (August 2022). Speech occasionally with pauses and word searching, impaired repetition and naming intact. Follows simple commands but occasionally requires repetition to accurately perform tasks, able to perform cross command. Chronic memory deficits    Cranial nerves - PERRLA, VFF, EOMI, face sensation (V1-V3) intact b/l, facial strength intact without asymmetry b/l, hearing intact b/l, palate with symmetric elevation, trapezius OR sternocleidomastiod 5/5 strength b/l, tongue midline on protrusion with full lateral movement    Motor - Moving all extremities antigravity against resistance with no motor drift. Normal bulk and tone throughout. Clumsy L hand but no sustained pronator drift.    Sensation - Diminished to light touch throughout the L extremities, intact to pinprick. Intact to light touch and pinprick throughout R extremities.    DTR's -             Biceps      Triceps     Brachioradialis      Patellar    Ankle    Toes/plantar response  R             3+             3+                  3+                       3+            3+                 Equivocal  L              3+             3+                 3+                        3+           3+                 Equivocal    Coordination - Finger to Nose intact b/l albeit with slight difficulty following command on the L. No tremors appreciated    Gait and station - Deferred due to repeated ongoing emesis    Labs:                        14.4   11.38 )-----------( 193      ( 03 Aug 2022 22:17 )             43.6     08-03    136  |  101  |  18  ----------------------------<  138<H>  3.5   |  22  |  1.07    Ca    8.6      03 Aug 2022 22:17    TPro  7.0  /  Alb  4.2  /  TBili  0.5  /  DBili  x   /  AST  25  /  ALT  30  /  AlkPhos  72  08-03    CAPILLARY BLOOD GLUCOSE  POCT Blood Glucose.: 128 mg/dL (03 Aug 2022 22:02)    LIVER FUNCTIONS - ( 03 Aug 2022 22:17 )  Alb: 4.2 g/dL / Pro: 7.0 g/dL / ALK PHOS: 72 U/L / ALT: 30 U/L / AST: 25 U/L / GGT: x             PT/INR - ( 03 Aug 2022 22:17 )   PT: 12.2 sec;   INR: 1.05 ratio    PTT - ( 03 Aug 2022 22:17 )  PTT:30.8 sec        Radiology:  CT Brain Stroke Protocol (08.03.22 @ 22:34)  IMPRESSION:    No CT evidence of acute intracranial hemorrhage, brain edema,or mass   effect.      CT Angio Neck w/ IV Cont (08.03.22 @ 22:35)  IMPRESSION:    CT PERFUSION: No evidence of core infarct. Focal hypoperfusion 7 cc in   the right frontal region, 4 cc in the right parieto-occipital region.    CTA BRAIN:Mild venous contamination. Question adjacent vein versus   mildly irregular early M2 branch at the superior aspect of the right M1   midsegment. No proximal large vessel occlusion or high-grade stenosis.    CTA NECK: Patent cervical vasculature. No flow limiting stenosis or   occlusion.

## 2022-08-05 DIAGNOSIS — I63.9 CEREBRAL INFARCTION, UNSPECIFIED: ICD-10-CM

## 2022-08-05 DIAGNOSIS — Z92.82 STATUS POST ADMINISTRATION OF TPA (RTPA) IN A DIFFERENT FACILITY WITHIN THE LAST 24 HOURS PRIOR TO ADMISSION TO CURRENT FACILITY: ICD-10-CM

## 2022-08-05 LAB
ALBUMIN SERPL ELPH-MCNC: 3.8 G/DL — SIGNIFICANT CHANGE UP (ref 3.3–5)
ALP SERPL-CCNC: 66 U/L — SIGNIFICANT CHANGE UP (ref 40–120)
ALT FLD-CCNC: 25 U/L — SIGNIFICANT CHANGE UP (ref 4–41)
ANION GAP SERPL CALC-SCNC: 9 MMOL/L — SIGNIFICANT CHANGE UP (ref 7–14)
APTT BLD: 30.1 SEC — SIGNIFICANT CHANGE UP (ref 27–36.3)
AST SERPL-CCNC: 33 U/L — SIGNIFICANT CHANGE UP (ref 4–40)
BASOPHILS # BLD AUTO: 0.06 K/UL — SIGNIFICANT CHANGE UP (ref 0–0.2)
BASOPHILS NFR BLD AUTO: 1 % — SIGNIFICANT CHANGE UP (ref 0–2)
BILIRUB SERPL-MCNC: 0.6 MG/DL — SIGNIFICANT CHANGE UP (ref 0.2–1.2)
BUN SERPL-MCNC: 12 MG/DL — SIGNIFICANT CHANGE UP (ref 7–23)
CALCIUM SERPL-MCNC: 8.7 MG/DL — SIGNIFICANT CHANGE UP (ref 8.4–10.5)
CHLORIDE SERPL-SCNC: 109 MMOL/L — HIGH (ref 98–107)
CO2 SERPL-SCNC: 24 MMOL/L — SIGNIFICANT CHANGE UP (ref 22–31)
CREAT SERPL-MCNC: 0.96 MG/DL — SIGNIFICANT CHANGE UP (ref 0.5–1.3)
EGFR: 95 ML/MIN/1.73M2 — SIGNIFICANT CHANGE UP
EOSINOPHIL # BLD AUTO: 0.39 K/UL — SIGNIFICANT CHANGE UP (ref 0–0.5)
EOSINOPHIL NFR BLD AUTO: 6.7 % — HIGH (ref 0–6)
GLUCOSE SERPL-MCNC: 105 MG/DL — HIGH (ref 70–99)
HCT VFR BLD CALC: 42 % — SIGNIFICANT CHANGE UP (ref 39–50)
HGB BLD-MCNC: 14.1 G/DL — SIGNIFICANT CHANGE UP (ref 13–17)
IANC: 3.02 K/UL — SIGNIFICANT CHANGE UP (ref 1.8–7.4)
IMM GRANULOCYTES NFR BLD AUTO: 0.5 % — SIGNIFICANT CHANGE UP (ref 0–1.5)
INR BLD: 0.96 RATIO — SIGNIFICANT CHANGE UP (ref 0.88–1.16)
LYMPHOCYTES # BLD AUTO: 1.54 K/UL — SIGNIFICANT CHANGE UP (ref 1–3.3)
LYMPHOCYTES # BLD AUTO: 26.6 % — SIGNIFICANT CHANGE UP (ref 13–44)
MAGNESIUM SERPL-MCNC: 2.3 MG/DL — SIGNIFICANT CHANGE UP (ref 1.6–2.6)
MCHC RBC-ENTMCNC: 29.4 PG — SIGNIFICANT CHANGE UP (ref 27–34)
MCHC RBC-ENTMCNC: 33.6 GM/DL — SIGNIFICANT CHANGE UP (ref 32–36)
MCV RBC AUTO: 87.7 FL — SIGNIFICANT CHANGE UP (ref 80–100)
MONOCYTES # BLD AUTO: 0.76 K/UL — SIGNIFICANT CHANGE UP (ref 0–0.9)
MONOCYTES NFR BLD AUTO: 13.1 % — SIGNIFICANT CHANGE UP (ref 2–14)
NEUTROPHILS # BLD AUTO: 3.02 K/UL — SIGNIFICANT CHANGE UP (ref 1.8–7.4)
NEUTROPHILS NFR BLD AUTO: 52.1 % — SIGNIFICANT CHANGE UP (ref 43–77)
NRBC # BLD: 0 /100 WBCS — SIGNIFICANT CHANGE UP
NRBC # FLD: 0 K/UL — SIGNIFICANT CHANGE UP
PHOSPHATE SERPL-MCNC: 3.7 MG/DL — SIGNIFICANT CHANGE UP (ref 2.5–4.5)
PLATELET # BLD AUTO: 165 K/UL — SIGNIFICANT CHANGE UP (ref 150–400)
POTASSIUM SERPL-MCNC: 3.9 MMOL/L — SIGNIFICANT CHANGE UP (ref 3.5–5.3)
POTASSIUM SERPL-SCNC: 3.9 MMOL/L — SIGNIFICANT CHANGE UP (ref 3.5–5.3)
PROT SERPL-MCNC: 6.9 G/DL — SIGNIFICANT CHANGE UP (ref 6–8.3)
PROTHROM AB SERPL-ACNC: 11.1 SEC — SIGNIFICANT CHANGE UP (ref 10.5–13.4)
RBC # BLD: 4.79 M/UL — SIGNIFICANT CHANGE UP (ref 4.2–5.8)
RBC # FLD: 13.2 % — SIGNIFICANT CHANGE UP (ref 10.3–14.5)
SODIUM SERPL-SCNC: 142 MMOL/L — SIGNIFICANT CHANGE UP (ref 135–145)
WBC # BLD: 5.8 K/UL — SIGNIFICANT CHANGE UP (ref 3.8–10.5)
WBC # FLD AUTO: 5.8 K/UL — SIGNIFICANT CHANGE UP (ref 3.8–10.5)

## 2022-08-05 PROCEDURE — 70551 MRI BRAIN STEM W/O DYE: CPT | Mod: 26

## 2022-08-05 PROCEDURE — 99291 CRITICAL CARE FIRST HOUR: CPT | Mod: GC

## 2022-08-05 RX ORDER — KETOROLAC TROMETHAMINE 30 MG/ML
15 SYRINGE (ML) INJECTION ONCE
Refills: 0 | Status: DISCONTINUED | OUTPATIENT
Start: 2022-08-05 | End: 2022-08-05

## 2022-08-05 RX ORDER — ACETAMINOPHEN 500 MG
975 TABLET ORAL ONCE
Refills: 0 | Status: COMPLETED | OUTPATIENT
Start: 2022-08-05 | End: 2022-08-05

## 2022-08-05 RX ORDER — ACETAMINOPHEN 500 MG
650 TABLET ORAL EVERY 6 HOURS
Refills: 0 | Status: DISCONTINUED | OUTPATIENT
Start: 2022-08-05 | End: 2022-08-07

## 2022-08-05 RX ADMIN — Medication 15 MILLIGRAM(S): at 21:26

## 2022-08-05 RX ADMIN — Medication 975 MILLIGRAM(S): at 08:58

## 2022-08-05 RX ADMIN — CHLORHEXIDINE GLUCONATE 1 APPLICATION(S): 213 SOLUTION TOPICAL at 14:32

## 2022-08-05 RX ADMIN — Medication 81 MILLIGRAM(S): at 16:10

## 2022-08-05 RX ADMIN — Medication 975 MILLIGRAM(S): at 09:58

## 2022-08-05 RX ADMIN — APIXABAN 5 MILLIGRAM(S): 2.5 TABLET, FILM COATED ORAL at 06:28

## 2022-08-05 RX ADMIN — ATORVASTATIN CALCIUM 80 MILLIGRAM(S): 80 TABLET, FILM COATED ORAL at 21:26

## 2022-08-05 RX ADMIN — APIXABAN 5 MILLIGRAM(S): 2.5 TABLET, FILM COATED ORAL at 18:36

## 2022-08-05 RX ADMIN — Medication 650 MILLIGRAM(S): at 16:53

## 2022-08-05 RX ADMIN — Medication 650 MILLIGRAM(S): at 17:23

## 2022-08-05 NOTE — PROGRESS NOTE ADULT - SUBJECTIVE AND OBJECTIVE BOX
Progress Note    08-03-22 (2d)    Patient is a 52y old  Male who presents with a chief complaint of     Subjective / Interval Events :  - No acute overnight events  - 24h post-TPA CTH was obtained around 22:45, appeared stable and pt was resumed on home ASA and Eliquis.  - Pt seen and examined at bedside this AM, no complaints. States headaches is significantly improved, 3/10 posterior, radiating down head.     Additional ROS (if any):    MEDICATIONS  (STANDING):  apixaban 5 milliGRAM(s) Oral every 12 hours  aspirin enteric coated 81 milliGRAM(s) Oral daily  atorvastatin 80 milliGRAM(s) Oral at bedtime  chlorhexidine 2% Cloths 1 Application(s) Topical daily    MEDICATIONS  (PRN):    CAPILLARY BLOOD GLUCOSE      I&O's Summary    04 Aug 2022 07:01  -  05 Aug 2022 07:00  --------------------------------------------------------  IN: 600 mL / OUT: 2100 mL / NET: -1500 mL        PHYSICAL EXAM:  Vital Signs Last 24 Hrs  T(C): 36.5 (05 Aug 2022 07:42), Max: 37.1 (04 Aug 2022 08:00)  T(F): 97.7 (05 Aug 2022 07:42), Max: 98.8 (04 Aug 2022 08:00)  HR: 48 (05 Aug 2022 07:00) (48 - 93)  BP: 124/78 (05 Aug 2022 07:00) (103/61 - 153/100)  BP(mean): 89 (05 Aug 2022 07:00) (70 - 113)  RR: 20 (05 Aug 2022 07:00) (8 - 28)  SpO2: 96% (05 Aug 2022 07:00) (94% - 100%)    Parameters below as of 05 Aug 2022 06:00  Patient On (Oxygen Delivery Method): room air      General: NAD, non-toxic appearing, lying down comfortably   HEENT: PERRL, EOMi, no scleral icterus  CV: RRR, normal S1 and S2, no m/r/g  Lungs: normal respiratory effort. CTAB, no wheezes, rales, or rhonchi  Abd: soft, nontender, nondistended  Ext: no edema, 2+ peripheral pulses   Pysch: AAOx3, appropriate affect   Neuro: grossly non-focal, moving all extremities spontaneously   Skin: no rashes or lesions     LABS:                          14.1   x     )-----------( 165      ( 05 Aug 2022 06:52 )             42.0       WBC Trend: 12.09<--, 11.38<--  Hb Trend: 14.1<--, 13.5<--, 14.4<--    08-04    135  |  101  |  13  ----------------------------<  113<H>  3.4<L>   |  21<L>  |  0.88    Ca    8.4      04 Aug 2022 06:10  Phos  3.8     08-04  Mg     1.90     08-04    TPro  6.6  /  Alb  3.8  /  TBili  0.8  /  DBili  x   /  AST  26  /  ALT  26  /  AlkPhos  66  08-04    PT/INR - ( 05 Aug 2022 06:52 )   PT: 11.1 sec;   INR: 0.96 ratio         PTT - ( 05 Aug 2022 06:52 )  PTT:30.1 sec  CARDIAC MARKERS ( 03 Aug 2022 22:17 )  x     / x     / 601 U/L / x     / 2.9 ng/mL      RADIOLOGY & ADDITIONAL TESTS: Reviewed  - Repeat CT 22:45 8/4 appears stable on gross read, pending official report

## 2022-08-05 NOTE — PROGRESS NOTE ADULT - SUBJECTIVE AND OBJECTIVE BOX
*************************************  NEUROLOGY PROGRESS NOTE  **************************************    TAMI DUNHAM  Male  [id number]    Subjective: Pt seen at bedside. No overnight events. Pt stated that he is still having HA (7/10 at max intensity) however tylenol is improving pain. No other complaints and pt denied any new weakness, numbness/tingling, lightheadedness. Pt stated that his L hand is back to baseline. mentation improved back to baseline.       VITAL SIGNS:  Vital Signs Last 24 Hrs  T(C): 36.5 (05 Aug 2022 07:42), Max: 36.9 (04 Aug 2022 19:48)  T(F): 97.7 (05 Aug 2022 07:42), Max: 98.4 (04 Aug 2022 19:48)  HR: 64 (05 Aug 2022 10:00) (48 - 84)  BP: 129/73 (05 Aug 2022 10:00) (103/61 - 153/100)  BP(mean): 85 (05 Aug 2022 10:00) (70 - 113)  RR: 15 (05 Aug 2022 10:00) (8 - 25)  SpO2: 96% (05 Aug 2022 10:00) (95% - 100%)    Parameters below as of 05 Aug 2022 08:00  Patient On (Oxygen Delivery Method): room air    PHYSICAL EXAMINATION:  General: Well-developed, well nourished, in no acute distress.  Eyes: Conjunctiva and sclera clear.  Neck: supple, nontender, good ROM  Lungs: no respiratory distress  Neurologic:  - Mental Status:  Alert, awake, oriented to person, place, and time; Speech is fluent with intact naming, repetition, and comprehension  - Cranial Nerves II-XII:  VFF, No nystagmus or APD noted, EOMI, PERRLA, V1-V3 intact, no facial asymmetry, t/p midline, SCM/trap intact.  - Motor:  Strength is 5/5 throughout.  There is no pronator drift.  Normal muscle bulk and tone throughout..  - Sensory:  Intact to light touch sense throughout.  - Coordination:  Finger-nose-finger and heel-knee-shin intact without dysmetria.  Rapid alternating hand movements intact.  - Gait:   Normal steps, base, arm swing, and turning.  Heel and toe walking are normal.  Tandem gait is normal.  Romberg testing is negative.    LABS:                          14.1   5.80  )-----------( 165      ( 05 Aug 2022 06:52 )             42.0     08-05    142  |  109<H>  |  12  ----------------------------<  105<H>  3.9   |  24  |  0.96    Ca    8.7      05 Aug 2022 06:52  Phos  3.7     08-05  Mg     2.30     08-05    TPro  6.9  /  Alb  3.8  /  TBili  0.6  /  DBili  x   /  AST  33  /  ALT  25  /  AlkPhos  66  08-05    PT/INR - ( 05 Aug 2022 06:52 )   PT: 11.1 sec;   INR: 0.96 ratio         PTT - ( 05 Aug 2022 06:52 )  PTT:30.1 sec      RADIOLOGY & ADDITIONAL STUDIES:      < from: CT Head No Cont (08.04.22 @ 22:47) >  IMPRESSION:  No significant change from the prior exam.    No mass effect or hemorrhage.    < end of copied text >

## 2022-08-05 NOTE — PROGRESS NOTE ADULT - ATTENDING COMMENTS
Patient is a 53 yo M w/ hjstory of reported septal defect (on Eliquis), prior TIAs and CVA (prior tPA 2/2022 with residual expressive aphasia) who was admitted on 8/3 after p/w L sided weakness and dysarthria/worsened expressive aphasia. Code stroke was called with CTH perfusion showing R frontal penumbra. Patient was given TPA and admitted to MICU for close monitoring.    #CVA s/p TPA. Serial CTH stable with no hemmorhage. Endorses improvement to close to baseline in neurological symptoms. PT evaluation with no needs. OT to make final recs. Passed Speech and Swallow. TTE normal with no evidence for PFO. No events on tele thus far, patient also has loop recorder  - f/u MRI brain  - f/u neuro if can d/c to home if MRI is OK  - f/ut OT recs  - Eliquis and ASA resumed  - Neuro checks  - Monitor on tele    Oliverio Uribe MD  Pulmonary & Critical Care

## 2022-08-05 NOTE — PROGRESS NOTE ADULT - ASSESSMENT
51yo L-handed man with a PMHx significant for CVA and TIAs (02/2022 s/p tPA, residual expressive aphasia), HLD, and a septal defect (on Eliquis) presents to the ED with difficulty using his L hand, nausea, vomiting, headache, and worsening expressive aphasia.  CTA head/neck showed no LVO. CTP showed a core of 0ml, and a R frontal penumbra of 7ml and parieto-occipital of 4ml. tPA was administered at 1051h. Patient was not a candidate for thrombectomy due to no LVO. Pt now back to baseline.  Of note, pt stated that he did not consistently take his eliquis prior to arrival at the hospital. Reported missing multiple evening doses over the past few days-weeks.     Impression: improvement of symptoms with weakness of the L hand, nausea, vomiting, headache, and L hypoesthesia due to R frontal and parieto-occipital ischemic CVA likely embolic in the setting of a septal defect. MRI pending. new ischemic event possibly due to missed eliquis doses.     Recommendations:   [] F/U MRI w/o contrast  [] MIMI may be indicated in the inpatient setting depending on MRI results  [] c/w Asa 81, statin (titrate to LDL < 70)  [] allow for normotension at this time   [] c/w eliquis bid 5 mg, pt educated on importance of not missing future doses   [] f/u OT recs    Case discussed with stroke fellow Dr. Arreguin and attending Dr. Castro.

## 2022-08-05 NOTE — CHART NOTE - NSCHARTNOTEFT_GEN_A_CORE
MICU Transfer Note  ---------------------------    Transfer from: MICU  Transfer to:  (  ) Medicine    (  ) Telemetry    (  ) RCU    (  ) Palliative    (  ) Stroke Unit    (  ) _______________  [facility]        OBJECTIVE --  Vital Signs Last 24 Hrs  T(C): 36.5 (05 Aug 2022 07:42), Max: 37.1 (04 Aug 2022 08:00)  T(F): 97.7 (05 Aug 2022 07:42), Max: 98.8 (04 Aug 2022 08:00)  HR: 48 (05 Aug 2022 07:00) (48 - 93)  BP: 124/78 (05 Aug 2022 07:00) (103/61 - 153/100)  BP(mean): 89 (05 Aug 2022 07:00) (70 - 113)  RR: 20 (05 Aug 2022 07:00) (8 - 28)  SpO2: 96% (05 Aug 2022 07:00) (94% - 100%)    Parameters below as of 05 Aug 2022 06:00  Patient On (Oxygen Delivery Method): room air        I&O's Summary    04 Aug 2022 07:01  -  05 Aug 2022 07:00  --------------------------------------------------------  IN: 600 mL / OUT: 2100 mL / NET: -1500 mL        MEDICATIONS  (STANDING):  apixaban 5 milliGRAM(s) Oral every 12 hours  aspirin enteric coated 81 milliGRAM(s) Oral daily  atorvastatin 80 milliGRAM(s) Oral at bedtime  chlorhexidine 2% Cloths 1 Application(s) Topical daily    MEDICATIONS  (PRN):        LABS                                            14.1                  Neurophils% (auto):   x      (08-05 @ 06:52):    x    )-----------(165          Lymphocytes% (auto):  x                                             42.0                   Eosinphils% (auto):   x        Manual%: Neutrophils x    ; Lymphocytes x    ; Eosinophils x    ; Bands%: x    ; Blasts x                                    142    |  109    |  12                  Calcium: 8.7   / iCa: x      (08-05 @ 06:52)    ----------------------------<  105       Magnesium: 2.30                             3.9     |  24     |  0.96             Phosphorous: 3.7      TPro  6.9    /  Alb  3.8    /  TBili  0.6    /  DBili  x      /  AST  33     /  ALT  25     /  AlkPhos  66     05 Aug 2022 06:52    ( 08-05 @ 06:52 )   PT: 11.1 sec;   INR: 0.96 ratio  aPTT: 30.1 sec          ASSESSMENT & PLAN:         For Follow-Up:  [ ]   [ ] MICU Transfer Note  ---------------------------    Transfer from: MICU  Transfer to:  (  ) Medicine    (  ) Telemetry    (  ) RCU    (  ) Palliative    (  ) Stroke Unit    (  ) _______________  Accepting Physician:      HPI / MICU COURSE  51yo PMH CVA and TIAs (02/06/2022 s/p tPA @ [facility], residual expressive aphasia, ?L sided deficit now resolved), HLD, and a septal defect (on Eliquis) presents to the ED with difficulty using his L hand today.  At 2000h patient was at [employer] for a rehearsal playing the keyboard when he was noticed to be making mistakes. He then began having trouble using his L hand and was dropping items prompting people to call EMS. When EMS arrived around 2100h he was reportedly back to baseline. However, upon arrival to the hospital EMS noted a slowed nicanor in his speech and the patient began having nausea, vomiting, and a R-sided headache. He denies acute changes in vision, numbness, recent fever, illness, or fall.    In the ED code stroke was activated. NIHSS 2. Pre-MRS 2. CTH showed no acute hemorrhage or pathology. CTA head/neck showed no LVO. CTP showed a core of 0ml and a R frontal penumbra of 7ml and parieto-occipital of 4ml. Discussed risks and benefits of tPA with patient who verbalized understanding with repetition. Patient provided verbal consent and tPA was administered at 1051h. Patient was not a candidate for thrombectomy due to no LVO.    Pt received TPA around ~23:00 8/3 and was admitted to MICU for q1 neuro check monitoring. Pt continued to have a HA during admission, stable from prior to TPA but a repeat CTH was performed, stable as prior. His 24h post TPA  CTH was a    OBJECTIVE --  Vital Signs Last 24 Hrs  T(C): 36.5 (05 Aug 2022 07:42), Max: 37.1 (04 Aug 2022 08:00)  T(F): 97.7 (05 Aug 2022 07:42), Max: 98.8 (04 Aug 2022 08:00)  HR: 48 (05 Aug 2022 07:00) (48 - 93)  BP: 124/78 (05 Aug 2022 07:00) (103/61 - 153/100)  BP(mean): 89 (05 Aug 2022 07:00) (70 - 113)  RR: 20 (05 Aug 2022 07:00) (8 - 28)  SpO2: 96% (05 Aug 2022 07:00) (94% - 100%)    Parameters below as of 05 Aug 2022 06:00  Patient On (Oxygen Delivery Method): room air      I&O's Summary    04 Aug 2022 07:01  -  05 Aug 2022 07:00  --------------------------------------------------------  IN: 600 mL / OUT: 2100 mL / NET: -1500 mL    General: NAD, non-toxic appearing, lying down comfortably   HEENT: PERRL, EOMi, no scleral icterus  CV: RRR, normal S1 and S2, no m/r/g  Lungs: normal respiratory effort. CTAB, no wheezes, rales, or rhonchi  Abd: soft, nontender, nondistended  Ext: no edema, 2+ peripheral pulses   Pysch: AAOx3, appropriate affect   Neuro: grossly non-focal, moving all extremities spontaneously   Skin: no rashes or lesions       MEDICATIONS  (STANDING):  apixaban 5 milliGRAM(s) Oral every 12 hours  aspirin enteric coated 81 milliGRAM(s) Oral daily  atorvastatin 80 milliGRAM(s) Oral at bedtime  chlorhexidine 2% Cloths 1 Application(s) Topical daily    MEDICATIONS  (PRN):        LABS                                            14.1                  Neurophils% (auto):   x      (08-05 @ 06:52):    x    )-----------(165          Lymphocytes% (auto):  x                                             42.0                   Eosinphils% (auto):   x        Manual%: Neutrophils x    ; Lymphocytes x    ; Eosinophils x    ; Bands%: x    ; Blasts x                                    142    |  109    |  12                  Calcium: 8.7   / iCa: x      (08-05 @ 06:52)    ----------------------------<  105       Magnesium: 2.30                             3.9     |  24     |  0.96             Phosphorous: 3.7      TPro  6.9    /  Alb  3.8    /  TBili  0.6    /  DBili  x      /  AST  33     /  ALT  25     /  AlkPhos  66     05 Aug 2022 06:52    ( 08-05 @ 06:52 )   PT: 11.1 sec;   INR: 0.96 ratio  aPTT: 30.1 sec          ASSESSMENT & PLAN:         For Follow-Up:  [ ]   [ ] MICU Transfer Note  ---------------------------    Transfer from: MICU  Transfer to:  (  ) Medicine    ( X) Telemetry    (  ) RCU    (  ) Palliative    (  ) Stroke Unit    (  ) _______________  Accepting Physician:      HPI / MICU COURSE  53yo PMH CVA and TIAs (02/06/2022 s/p tPA @ [facility], residual expressive aphasia, ?L sided deficit now resolved), HLD, and a septal defect (on Eliquis) presents to the ED with difficulty using his L hand today.  At 2000h patient was at [employer] for a rehearsal playing the keyboard when he was noticed to be making mistakes. He then began having trouble using his L hand and was dropping items prompting people to call EMS. When EMS arrived around 2100h he was reportedly back to baseline. However, upon arrival to the hospital EMS noted a slowed nicanor in his speech and the patient began having nausea, vomiting, and a R-sided headache. He denies acute changes in vision, numbness, recent fever, illness, or fall.    In the ED code stroke was activated. NIHSS 2. Pre-MRS 2. CTH showed no acute hemorrhage or pathology. CTA head/neck showed no LVO. CTP showed a core of 0ml and a R frontal penumbra of 7ml and parieto-occipital of 4ml. Discussed risks and benefits of tPA with patient who verbalized understanding with repetition. Patient provided verbal consent and tPA was administered at 1051h. Patient was not a candidate for thrombectomy due to no LVO.    Pt received TPA around ~23:00 8/3 and was admitted to MICU for q1 neuro check monitoring. Pt continued to have a HA during admission, stable from prior to TPA but a repeat CTH was performed, stable as prior. His 24h post TPA CTH was performed and stable. Now resumed on home Eliquis and ASA, which were recently prescribed during last CVA in 2/2022 given history of multiple CVA/TIAs. PT/OT/Speech following. Pt is now stable for transfer for further monitoring on telemetry. Pending MR brain.     OBJECTIVE --  Vital Signs Last 24 Hrs  T(C): 36.5 (05 Aug 2022 07:42), Max: 37.1 (04 Aug 2022 08:00)  T(F): 97.7 (05 Aug 2022 07:42), Max: 98.8 (04 Aug 2022 08:00)  HR: 48 (05 Aug 2022 07:00) (48 - 93)  BP: 124/78 (05 Aug 2022 07:00) (103/61 - 153/100)  BP(mean): 89 (05 Aug 2022 07:00) (70 - 113)  RR: 20 (05 Aug 2022 07:00) (8 - 28)  SpO2: 96% (05 Aug 2022 07:00) (94% - 100%)    Parameters below as of 05 Aug 2022 06:00  Patient On (Oxygen Delivery Method): room air      I&O's Summary    04 Aug 2022 07:01  -  05 Aug 2022 07:00  --------------------------------------------------------  IN: 600 mL / OUT: 2100 mL / NET: -1500 mL    General: NAD, non-toxic appearing, lying down comfortably   HEENT: PERRL, EOMi, no scleral icterus  CV: RRR, normal S1 and S2, no m/r/g  Lungs: normal respiratory effort. CTAB, no wheezes, rales, or rhonchi  Abd: soft, nontender, nondistended  Ext: no edema, 2+ peripheral pulses   Pysch: AAOx3, appropriate affect   Neuro: grossly non-focal, moving all extremities spontaneously   Skin: no rashes or lesions       MEDICATIONS  (STANDING):  apixaban 5 milliGRAM(s) Oral every 12 hours  aspirin enteric coated 81 milliGRAM(s) Oral daily  atorvastatin 80 milliGRAM(s) Oral at bedtime  chlorhexidine 2% Cloths 1 Application(s) Topical daily    MEDICATIONS  (PRN):        LABS                                            14.1                  Neurophils% (auto):   x      (08-05 @ 06:52):    x    )-----------(165          Lymphocytes% (auto):  x                                             42.0                   Eosinphils% (auto):   x        Manual%: Neutrophils x    ; Lymphocytes x    ; Eosinophils x    ; Bands%: x    ; Blasts x                                    142    |  109    |  12                  Calcium: 8.7   / iCa: x      (08-05 @ 06:52)    ----------------------------<  105       Magnesium: 2.30                             3.9     |  24     |  0.96             Phosphorous: 3.7      TPro  6.9    /  Alb  3.8    /  TBili  0.6    /  DBili  x      /  AST  33     /  ALT  25     /  AlkPhos  66     05 Aug 2022 06:52    ( 08-05 @ 06:52 )   PT: 11.1 sec;   INR: 0.96 ratio  aPTT: 30.1 sec          ASSESSMENT & PLAN:   52M PMH multiple CVA and TIAs on ASA and Eliquis (last 02/06/2022 s/p tPA @ [facility], residual expressive aphasia, ?L sided deficit now resolved), HLD, and a known atrial septal defect here for L sided weakness, hypoesthesia and aphasia found to have weakness of the L hand, due to R frontal and parieto-occipital ischemic CVA s/p TPA ~23:00 8/3, admitted to MICU for q1 neuro checks. Repeat 24h CTH stable.     Neuro:  #R frontal and parieto-occipital ischemic CVA  h/o multiple CVA's and TIA's iso known ASD. Concern for embolic etiology, however, per pt follows private cardiologist, deemed ASD too small. On ASA and Eliquis at home   - s/p TPA ~23:00 8/3  - repeat CTH was performed 11:11 8/4 for HA (stable from presentation/prior to TPA) - stable   - repeat CTH 24hrs post TPA - stable on prelim read   - home Eliquis and ASA resumed   - high dose statin   - TTE w/ bubble wnl EF 60%, no obvious PFO demonstrated   - pending MRI brain w/o contrast  - PT/OT/Speech & swallow   - appreciate neuro recs     CV:  #Known ASD  Follows outpatient cardiologist, deemed too small to be cause of previous multiple CVA/TIAs, on ASA and Eliquis   - TTE with bubble study did not demonstrate PFO   - atorvastatin 80mg  - antiplatelet/AC resumed     Resp:  - No active issues. Saturating well on room air, no supplemental O2 requirements    GI:  #Diet and Nutrition: regular     Renal:  - No active issues currently. SCr wnl    Heme:  - no active issues. H/H stable  #DVT ppx: home Eliquis 5mg BID (rx'ed for h/o multiple CVA/TIAs)    ID:  - No active issues    Endo:  - No active issues    Access/Lines/Tubes/Drains:  - PIVs    Social/Ethics:  - Code status: Full code      For Follow-Up:  [ ] monitor on telemetry   [ ] f/u MR brain  [ ] f/u neuro recs MICU Transfer Note  ---------------------------    Transfer from: MICU  Transfer to:  (  ) Medicine    ( X) Telemetry    (  ) RCU    (  ) Palliative    (  ) Stroke Unit    (  ) _______________  Accepting Physician:      HPI / MICU COURSE  51yo PMH CVA and TIAs (02/06/2022 s/p tPA @ [facility], residual expressive aphasia, ?L sided deficit now resolved), HLD, and a septal defect (on Eliquis) presents to the ED with difficulty using his L hand today.  At 2000h patient was at [employer] for a rehearsal playing the keyboard when he was noticed to be making mistakes. He then began having trouble using his L hand and was dropping items prompting people to call EMS. When EMS arrived around 2100h he was reportedly back to baseline. However, upon arrival to the hospital EMS noted a slowed nicanor in his speech and the patient began having nausea, vomiting, and a R-sided headache. He denies acute changes in vision, numbness, recent fever, illness, or fall.    In the ED code stroke was activated. NIHSS 2. Pre-MRS 2. CTH showed no acute hemorrhage or pathology. CTA head/neck showed no LVO. CTP showed a core of 0ml and a R frontal penumbra of 7ml and parieto-occipital of 4ml. Discussed risks and benefits of tPA with patient who verbalized understanding with repetition. Patient provided verbal consent and tPA was administered at 1051h. Patient was not a candidate for thrombectomy due to no LVO.    Pt received TPA around ~23:00 8/3 and was admitted to MICU for q1 neuro check monitoring. Pt continued to have a HA during admission, stable from prior to TPA but a repeat CTH was performed, stable as prior. His 24h post TPA CTH was performed and stable. Now resumed on home Eliquis and ASA, which were recently prescribed during last CVA in 2/2022 given history of multiple CVA/TIAs. PT/OT/Speech following. Pt is now stable for transfer for further monitoring on telemetry. Pending MR brain.     OBJECTIVE --  Vital Signs Last 24 Hrs  T(C): 36.5 (05 Aug 2022 07:42), Max: 37.1 (04 Aug 2022 08:00)  T(F): 97.7 (05 Aug 2022 07:42), Max: 98.8 (04 Aug 2022 08:00)  HR: 48 (05 Aug 2022 07:00) (48 - 93)  BP: 124/78 (05 Aug 2022 07:00) (103/61 - 153/100)  BP(mean): 89 (05 Aug 2022 07:00) (70 - 113)  RR: 20 (05 Aug 2022 07:00) (8 - 28)  SpO2: 96% (05 Aug 2022 07:00) (94% - 100%)    Parameters below as of 05 Aug 2022 06:00  Patient On (Oxygen Delivery Method): room air      I&O's Summary    04 Aug 2022 07:01  -  05 Aug 2022 07:00  --------------------------------------------------------  IN: 600 mL / OUT: 2100 mL / NET: -1500 mL    General: NAD, non-toxic appearing, lying down comfortably   HEENT: PERRL, EOMi, no scleral icterus  CV: RRR, normal S1 and S2, no m/r/g  Lungs: normal respiratory effort. CTAB, no wheezes, rales, or rhonchi  Abd: soft, nontender, nondistended  Ext: no edema, 2+ peripheral pulses   Pysch: AAOx3, appropriate affect   Neuro: grossly non-focal, moving all extremities spontaneously   Skin: no rashes or lesions       MEDICATIONS  (STANDING):  apixaban 5 milliGRAM(s) Oral every 12 hours  aspirin enteric coated 81 milliGRAM(s) Oral daily  atorvastatin 80 milliGRAM(s) Oral at bedtime  chlorhexidine 2% Cloths 1 Application(s) Topical daily    MEDICATIONS  (PRN):        LABS                                            14.1                  Neurophils% (auto):   x      (08-05 @ 06:52):    x    )-----------(165          Lymphocytes% (auto):  x                                             42.0                   Eosinphils% (auto):   x        Manual%: Neutrophils x    ; Lymphocytes x    ; Eosinophils x    ; Bands%: x    ; Blasts x                                    142    |  109    |  12                  Calcium: 8.7   / iCa: x      (08-05 @ 06:52)    ----------------------------<  105       Magnesium: 2.30                             3.9     |  24     |  0.96             Phosphorous: 3.7      TPro  6.9    /  Alb  3.8    /  TBili  0.6    /  DBili  x      /  AST  33     /  ALT  25     /  AlkPhos  66     05 Aug 2022 06:52    ( 08-05 @ 06:52 )   PT: 11.1 sec;   INR: 0.96 ratio  aPTT: 30.1 sec          ASSESSMENT & PLAN:   52M PMH multiple CVA and TIAs on ASA and Eliquis (last 02/06/2022 s/p tPA @ [facility], residual expressive aphasia, ?L sided deficit now resolved), HLD, and a known atrial septal defect here for L sided weakness, hypoesthesia and aphasia found to have weakness of the L hand, due to R frontal and parieto-occipital ischemic CVA s/p TPA ~23:00 8/3, admitted to MICU for q1 neuro checks. Repeat 24h CTH stable.     Neuro:  #R frontal and parieto-occipital ischemic CVA  h/o multiple CVA's and TIA's iso known ASD. Concern for embolic etiology, however, per pt follows private cardiologist, deemed ASD too small. On ASA and Eliquis at home   - s/p TPA ~23:00 8/3  - repeat CTH was performed 11:11 8/4 for HA (stable from presentation/prior to TPA) - stable   - repeat CTH 24hrs post TPA - stable on prelim read   - home Eliquis and ASA resumed   - high dose statin   - TTE w/ bubble wnl EF 60%, no obvious PFO demonstrated   - pending MRI brain w/o contrast  - PT/OT/Speech & swallow   - appreciate neuro recs     CV:  #Known ASD  Follows outpatient cardiologist, deemed too small to be cause of previous multiple CVA/TIAs, on ASA and Eliquis   - TTE with bubble study did not demonstrate PFO   - atorvastatin 80mg  - antiplatelet/AC resumed     Resp:  - No active issues. Saturating well on room air, no supplemental O2 requirements    GI:  #Diet and Nutrition: regular     Renal:  - No active issues currently. SCr wnl    Heme:  - no active issues. H/H stable  #DVT ppx: home Eliquis 5mg BID (rx'ed for h/o multiple CVA/TIAs)    ID:  - No active issues    Endo:  - No active issues    Access/Lines/Tubes/Drains:  - PIVs    Social/Ethics:  - Code status: Full code      For Follow-Up:  [ ] monitor on telemetry   [ ] f/u MR brain  [ ] f/u loop recorder interrogation by EP  [ ] f/u neuro recs MICU Transfer Note  ---------------------------    Transfer from: MICU  Transfer to:  (  ) Medicine    ( X) Telemetry    (  ) RCU    (  ) Palliative    (  ) Stroke Unit    (  ) _______________  Accepting Physician: Dr. Ofelia Arriola       HPI / MICU COURSE  51yo PMH CVA and TIAs (02/06/2022 s/p tPA @ [facility], residual expressive aphasia, ?L sided deficit now resolved), HLD, and a septal defect (on Eliquis) presents to the ED with difficulty using his L hand today.  At 2000h patient was at [employer] for a rehearsal playing the keyboard when he was noticed to be making mistakes. He then began having trouble using his L hand and was dropping items prompting people to call EMS. When EMS arrived around 2100h he was reportedly back to baseline. However, upon arrival to the hospital EMS noted a slowed nicanor in his speech and the patient began having nausea, vomiting, and a R-sided headache. He denies acute changes in vision, numbness, recent fever, illness, or fall.    In the ED code stroke was activated. NIHSS 2. Pre-MRS 2. CTH showed no acute hemorrhage or pathology. CTA head/neck showed no LVO. CTP showed a core of 0ml and a R frontal penumbra of 7ml and parieto-occipital of 4ml. Discussed risks and benefits of tPA with patient who verbalized understanding with repetition. Patient provided verbal consent and tPA was administered at 1051h. Patient was not a candidate for thrombectomy due to no LVO.    Pt received TPA around ~23:00 8/3 and was admitted to MICU for q1 neuro check monitoring. Pt continued to have a HA during admission, stable from prior to TPA but a repeat CTH was performed, stable as prior. His 24h post TPA CTH was performed and stable. Now resumed on home Eliquis and ASA, which were recently prescribed during last CVA in 2/2022 given history of multiple CVA/TIAs. PT/OT/Speech following. Pt is now stable for transfer for further monitoring on telemetry. Pending MR brain.     OBJECTIVE --  Vital Signs Last 24 Hrs  T(C): 36.5 (05 Aug 2022 07:42), Max: 37.1 (04 Aug 2022 08:00)  T(F): 97.7 (05 Aug 2022 07:42), Max: 98.8 (04 Aug 2022 08:00)  HR: 48 (05 Aug 2022 07:00) (48 - 93)  BP: 124/78 (05 Aug 2022 07:00) (103/61 - 153/100)  BP(mean): 89 (05 Aug 2022 07:00) (70 - 113)  RR: 20 (05 Aug 2022 07:00) (8 - 28)  SpO2: 96% (05 Aug 2022 07:00) (94% - 100%)    Parameters below as of 05 Aug 2022 06:00  Patient On (Oxygen Delivery Method): room air      I&O's Summary    04 Aug 2022 07:01  -  05 Aug 2022 07:00  --------------------------------------------------------  IN: 600 mL / OUT: 2100 mL / NET: -1500 mL    General: NAD, non-toxic appearing, lying down comfortably   HEENT: PERRL, EOMi, no scleral icterus  CV: RRR, normal S1 and S2, no m/r/g  Lungs: normal respiratory effort. CTAB, no wheezes, rales, or rhonchi  Abd: soft, nontender, nondistended  Ext: no edema, 2+ peripheral pulses   Pysch: AAOx3, appropriate affect   Neuro: grossly non-focal, moving all extremities spontaneously   Skin: no rashes or lesions       MEDICATIONS  (STANDING):  apixaban 5 milliGRAM(s) Oral every 12 hours  aspirin enteric coated 81 milliGRAM(s) Oral daily  atorvastatin 80 milliGRAM(s) Oral at bedtime  chlorhexidine 2% Cloths 1 Application(s) Topical daily    MEDICATIONS  (PRN):        LABS                                            14.1                  Neurophils% (auto):   x      (08-05 @ 06:52):    x    )-----------(165          Lymphocytes% (auto):  x                                             42.0                   Eosinphils% (auto):   x        Manual%: Neutrophils x    ; Lymphocytes x    ; Eosinophils x    ; Bands%: x    ; Blasts x                                    142    |  109    |  12                  Calcium: 8.7   / iCa: x      (08-05 @ 06:52)    ----------------------------<  105       Magnesium: 2.30                             3.9     |  24     |  0.96             Phosphorous: 3.7      TPro  6.9    /  Alb  3.8    /  TBili  0.6    /  DBili  x      /  AST  33     /  ALT  25     /  AlkPhos  66     05 Aug 2022 06:52    ( 08-05 @ 06:52 )   PT: 11.1 sec;   INR: 0.96 ratio  aPTT: 30.1 sec          ASSESSMENT & PLAN:   52M PMH multiple CVA and TIAs on ASA and Eliquis (last 02/06/2022 s/p tPA @ [facility], residual expressive aphasia, ?L sided deficit now resolved), HLD, and a known atrial septal defect here for L sided weakness, hypoesthesia and aphasia found to have weakness of the L hand, due to R frontal and parieto-occipital ischemic CVA s/p TPA ~23:00 8/3, admitted to MICU for q1 neuro checks. Repeat 24h CTH stable.     Neuro:  #R frontal and parieto-occipital ischemic CVA  h/o multiple CVA's and TIA's iso known ASD. Concern for embolic etiology, however, per pt follows private cardiologist, deemed ASD too small. On ASA and Eliquis at home   - s/p TPA ~23:00 8/3  - repeat CTH was performed 11:11 8/4 for HA (stable from presentation/prior to TPA) - stable   - repeat CTH 24hrs post TPA - stable on prelim read   - home Eliquis and ASA resumed   - high dose statin   - TTE w/ bubble wnl EF 60%, no obvious PFO demonstrated   - pending MRI brain w/o contrast  - PT/OT/Speech & swallow   - appreciate neuro recs     CV:  #Known ASD  Follows outpatient cardiologist, deemed too small to be cause of previous multiple CVA/TIAs, on ASA and Eliquis   - TTE with bubble study did not demonstrate PFO   - atorvastatin 80mg  - antiplatelet/AC resumed     Resp:  - No active issues. Saturating well on room air, no supplemental O2 requirements    GI:  #Diet and Nutrition: regular     Renal:  - No active issues currently. SCr wnl    Heme:  - no active issues. H/H stable  #DVT ppx: home Eliquis 5mg BID (rx'ed for h/o multiple CVA/TIAs)    ID:  - No active issues    Endo:  - No active issues    Access/Lines/Tubes/Drains:  - PIVs    Social/Ethics:  - Code status: Full code      For Follow-Up:  [ ] monitor on telemetry   [ ] f/u MR brain  [ ] f/u loop recorder interrogation by EP  [ ] f/u neuro recs

## 2022-08-05 NOTE — PROGRESS NOTE ADULT - ASSESSMENT
52M PMH multiple CVA and TIAs on ASA and Eliquis (last 02/06/2022 s/p tPA @ [facility], residual expressive aphasia, ?L sided deficit now resolved), HLD, and a known atrial septal defect here for L sided weakness, hypoesthesia and aphasia found to have weakness of the L hand, due to R frontal and parieto-occipital ischemic CVA s/p TPA ~23:00 8/3, admitted to MICU for q1 neuro checks.     Neuro:  #R frontal and parieto-occipital ischemic CVA  h/o multiple CVA's and TIA's iso known ASD. Concern for embolic etiology, however, per pt follows private cardiologist, deemed ASD too small. On ASA and Eliquis at home   - s/p TPA ~23:00 8/3  - repeat CTH was performed 11:11 8/4 for HA (stable from presentation/prior to TPA) - stable   - repeat CTH 24hrs post TPA: stable on prelim read   - resumed on ASA, Eliquis   - TTE w/ bubble did not demonstrate PFO   - pending MRI brain w/o contrast  - PT/OT/Speech & swallow   - appreciate neuro recs     CV:  #Known ASD  Follows outpatient cardiologist, deemed too small to be cause of previous multiple CVA/TIAs, on ASA and Eliquis   - TTE with bubble study  - atorvastatin 80mg  - resumed ASA/AC     Resp:  - No active issues. Saturating well on room air, no supplemental O2 requirements    GI:  #Diet and Nutrition: Pending S/S evaluation    Renal:  - No active issues currently. SCr wnl    Heme:  - no active issues. H/H stable  #DVT ppx: home Eliquis     ID:  - No active issues    Endo:  - No active issues    Access/Lines/Tubes/Drains:  - PIVs    Social/Ethics:  - Code status: Full code    DISPO:  - transfer to telemetry after 24hrs post-TPA of q1h neuro checks

## 2022-08-05 NOTE — PROGRESS NOTE ADULT - ASSESSMENT
52M PMH multiple CVA and TIAs on ASA and Eliquis (last 02/06/2022 s/p tPA @ [facility], residual expressive aphasia, ?L sided deficit now resolved), HLD, and a known atrial septal defect here for L sided weakness, hypoesthesia and aphasia found to have weakness of the L hand, due to R frontal and parieto-occipital ischemic CVA s/p TPA ~23:00 8/3, admitted to MICU for q1 neuro checks.     Neuro:  #R frontal and parieto-occipital ischemic CVA  h/o multiple CVA's and TIA's iso known ASD. Concern for embolic etiology, however, per pt follows private cardiologist, deemed ASD too small. On ASA and Eliquis at home   - s/p TPA ~23:00 8/3  - repeat CTH was performed 11:11 8/4 for HA (stable from presentation/prior to TPA) - stable   - repeat CTH 24hrs post TPA - stable on prelim read   - home Eliquis and ASA resumed   - high dose statin   - TTE w/ bubble wnl EF 60%, no obvious PFO demonstrated   - pending MRI brain w/o contrast  - PT/OT/Speech & swallow   - appreciate neuro recs     CV:  #Known ASD  Follows outpatient cardiologist, deemed too small to be cause of previous multiple CVA/TIAs, on ASA and Eliquis   - TTE with bubble study did not demonstrate PFO   - atorvastatin 80mg  - antiplatelet/AC resumed     Resp:  - No active issues. Saturating well on room air, no supplemental O2 requirements    GI:  #Diet and Nutrition: regular     Renal:  - No active issues currently. SCr wnl    Heme:  - no active issues. H/H stable  #DVT ppx: home Eliquis 5mg BID    ID:  - No active issues    Endo:  - No active issues    Access/Lines/Tubes/Drains:  - PIVs    Social/Ethics:  - Code status: Full code    DISPO:  - stable for transfer to medicine        52M PMH multiple CVA and TIAs on ASA and Eliquis (last 02/06/2022 s/p tPA @ [facility], residual expressive aphasia, ?L sided deficit now resolved), with loop recorder, HLD, and a known atrial septal defect here for L sided weakness, hypoesthesia and aphasia found to have weakness of the L hand, due to R frontal and parieto-occipital ischemic CVA s/p TPA ~23:00 8/3, admitted to MICU for q1 neuro checks.     Neuro:  #R frontal and parieto-occipital ischemic CVA  h/o multiple CVA's and TIA's iso known ASD. Concern for embolic etiology, however, per pt follows private cardiologist, deemed ASD too small. On ASA and Eliquis at home   - s/p TPA ~23:00 8/3  - repeat CTH was performed 11:11 8/4 for HA (stable from presentation/prior to TPA) - stable   - repeat CTH 24hrs post TPA - stable on prelim read   - home Eliquis and ASA resumed   - high dose statin   - TTE w/ bubble wnl EF 60%, no obvious PFO demonstrated   - pending MRI brain w/o contrast  - PT/OT/Speech & swallow   - appreciate neuro recs     CV:  #loop recorder  - EP consulted for interrogation    #Known ASD  Follows outpatient cardiologist, deemed too small to be cause of previous multiple CVA/TIAs, on ASA and Eliquis   - TTE with bubble study did not demonstrate PFO   - atorvastatin 80mg  - antiplatelet/AC resumed     Resp:  - No active issues. Saturating well on room air, no supplemental O2 requirements    GI:  #Diet and Nutrition: regular     Renal:  - No active issues currently. SCr wnl    Heme:  - no active issues. H/H stable  #DVT ppx: home Eliquis 5mg BID    ID:  - No active issues    Endo:  - No active issues    Access/Lines/Tubes/Drains:  - PIVs    Social/Ethics:  - Code status: Full code    DISPO:  - stable for transfer to medicine

## 2022-08-06 PROCEDURE — 99233 SBSQ HOSP IP/OBS HIGH 50: CPT

## 2022-08-06 RX ADMIN — Medication 81 MILLIGRAM(S): at 12:41

## 2022-08-06 RX ADMIN — APIXABAN 5 MILLIGRAM(S): 2.5 TABLET, FILM COATED ORAL at 18:00

## 2022-08-06 RX ADMIN — CHLORHEXIDINE GLUCONATE 1 APPLICATION(S): 213 SOLUTION TOPICAL at 13:19

## 2022-08-06 RX ADMIN — APIXABAN 5 MILLIGRAM(S): 2.5 TABLET, FILM COATED ORAL at 05:37

## 2022-08-06 RX ADMIN — ATORVASTATIN CALCIUM 80 MILLIGRAM(S): 80 TABLET, FILM COATED ORAL at 21:11

## 2022-08-06 NOTE — PROGRESS NOTE ADULT - SUBJECTIVE AND OBJECTIVE BOX
[facility] Division of Hospital Medicine  Milady Stanton MD  Available via MS Teams  Pager: [phone]    SUBJECTIVE / OVERNIGHT EVENTS:  Patient transferred from MICU today. He was eating lunch comfortably. Denies any weakness, numbness at the time.     ADDITIONAL REVIEW OF SYSTEMS:    MEDICATIONS  (STANDING):  apixaban 5 milliGRAM(s) Oral every 12 hours  aspirin enteric coated 81 milliGRAM(s) Oral daily  atorvastatin 80 milliGRAM(s) Oral at bedtime  chlorhexidine 2% Cloths 1 Application(s) Topical daily    MEDICATIONS  (PRN):  acetaminophen     Tablet .. 650 milliGRAM(s) Oral every 6 hours PRN Temp greater or equal to 38C (100.4F), Mild Pain (1 - 3), Moderate Pain (4 - 6)      I&O's Summary    05 Aug 2022 07:01  -  06 Aug 2022 07:00  --------------------------------------------------------  IN: 360 mL / OUT: 925 mL / NET: -565 mL        PHYSICAL EXAM:  Vital Signs Last 24 Hrs  T(C): 36.6 (06 Aug 2022 12:38), Max: 36.8 (05 Aug 2022 20:00)  T(F): 97.8 (06 Aug 2022 12:38), Max: 98.2 (05 Aug 2022 20:00)  HR: 67 (06 Aug 2022 12:38) (54 - 67)  BP: 144/97 (06 Aug 2022 12:38) (108/69 - 148/102)  BP(mean): 91 (05 Aug 2022 23:00) (79 - 110)  RR: 18 (06 Aug 2022 12:38) (16 - 21)  SpO2: 98% (06 Aug 2022 12:38) (96% - 98%)    Parameters below as of 06 Aug 2022 12:38  Patient On (Oxygen Delivery Method): room air      CONSTITUTIONAL: NAD,comfortable  EYES: PERRLA; conjunctiva and sclera clear  ENMT: Moist oral mucosa, no pharyngeal injection or exudates; normal dentition  NECK: Supple, no palpable masses; no thyromegaly  RESPIRATORY: Normal respiratory effort; lungs are clear to auscultation bilaterally  CARDIOVASCULAR: Regular rate and rhythm, normal S1 and S2, no murmur/rub/gallop; No lower extremity edema; Peripheral pulses are 2+ bilaterally  ABDOMEN: Nontender to palpation, normoactive bowel sounds, no rebound/guarding; No hepatosplenomegaly  MUSCULOSKELETAL:  Normal gait; no clubbing or cyanosis of digits; no joint swelling or tenderness to palpation  PSYCH: A+O to person, place, and time; affect appropriate  NEUROLOGY: CN 2-12 are intact and symmetric; no gross sensory deficits   SKIN: No rashes; no palpable lesions    LABS:                        14.1   5.80  )-----------( 165      ( 05 Aug 2022 06:52 )             42.0     08-05    142  |  109<H>  |  12  ----------------------------<  105<H>  3.9   |  24  |  0.96    Ca    8.7      05 Aug 2022 06:52  Phos  3.7     08-05  Mg     2.30     08-05    TPro  6.9  /  Alb  3.8  /  TBili  0.6  /  DBili  x   /  AST  33  /  ALT  25  /  AlkPhos  66  08-05    PT/INR - ( 05 Aug 2022 06:52 )   PT: 11.1 sec;   INR: 0.96 ratio         PTT - ( 05 Aug 2022 06:52 )  PTT:30.1 sec              RADIOLOGY & ADDITIONAL TESTS:  New Results Reviewed Today:   New Imaging Personally Reviewed Today:  New Electrocardiogram Personally Reviewed Today:  Prior or Outpatient Records Reviewed Today:    COMMUNICATION:  Care Discussed with Consultants/Other Providers and Details of Discussion:  Discussions with Patient/Family:  PCP Communication:     [facility] Division of Hospital Medicine  Milady Stanton MD  Available via MS Teams  Pager: [phone]    SUBJECTIVE / OVERNIGHT EVENTS:  Patient transferred from MICU today. He was eating lunch comfortably. Denies any weakness, numbness at the time.     ADDITIONAL REVIEW OF SYSTEMS:    MEDICATIONS  (STANDING):  apixaban 5 milliGRAM(s) Oral every 12 hours  aspirin enteric coated 81 milliGRAM(s) Oral daily  atorvastatin 80 milliGRAM(s) Oral at bedtime  chlorhexidine 2% Cloths 1 Application(s) Topical daily    MEDICATIONS  (PRN):  acetaminophen     Tablet .. 650 milliGRAM(s) Oral every 6 hours PRN Temp greater or equal to 38C (100.4F), Mild Pain (1 - 3), Moderate Pain (4 - 6)      I&O's Summary    05 Aug 2022 07:01  -  06 Aug 2022 07:00  --------------------------------------------------------  IN: 360 mL / OUT: 925 mL / NET: -565 mL        PHYSICAL EXAM:  Vital Signs Last 24 Hrs  T(C): 36.6 (06 Aug 2022 12:38), Max: 36.8 (05 Aug 2022 20:00)  T(F): 97.8 (06 Aug 2022 12:38), Max: 98.2 (05 Aug 2022 20:00)  HR: 67 (06 Aug 2022 12:38) (54 - 67)  BP: 144/97 (06 Aug 2022 12:38) (108/69 - 148/102)  BP(mean): 91 (05 Aug 2022 23:00) (79 - 110)  RR: 18 (06 Aug 2022 12:38) (16 - 21)  SpO2: 98% (06 Aug 2022 12:38) (96% - 98%)    Parameters below as of 06 Aug 2022 12:38  Patient On (Oxygen Delivery Method): room air      CONSTITUTIONAL: NAD,comfortable  EYES: PERRLA; conjunctiva and sclera clear  ENMT: Moist oral mucosa, no pharyngeal injection or exudates; normal dentition  NECK: Supple, no palpable masses; no thyromegaly  RESPIRATORY: Normal respiratory effort; lungs are clear to auscultation bilaterally  CARDIOVASCULAR: Regular rate and rhythm, normal S1 and S2, no murmur/rub/gallop; No lower extremity edema; Peripheral pulses are 2+ bilaterally  ABDOMEN: Nontender to palpation, normoactive bowel sounds, no rebound/guarding; No hepatosplenomegaly  MUSCULOSKELETAL:  Normal gait; no clubbing or cyanosis of digits; no joint swelling or tenderness to palpation  PSYCH: A+O to person, place, and time; affect appropriate  NEUROLOGY: CN 2-12 are intact and symmetric; no gross sensory deficits   SKIN: No rashes; no palpable lesions    LABS:                        14.1   5.80  )-----------( 165      ( 05 Aug 2022 06:52 )             42.0     08-05    142  |  109<H>  |  12  ----------------------------<  105<H>  3.9   |  24  |  0.96    Ca    8.7      05 Aug 2022 06:52  Phos  3.7     08-05  Mg     2.30     08-05    TPro  6.9  /  Alb  3.8  /  TBili  0.6  /  DBili  x   /  AST  33  /  ALT  25  /  AlkPhos  66  08-05    PT/INR - ( 05 Aug 2022 06:52 )   PT: 11.1 sec;   INR: 0.96 ratio         PTT - ( 05 Aug 2022 06:52 )  PTT:30.1 sec              RADIOLOGY & ADDITIONAL TESTS:  MRI brain  FINDINGS:  No current evidence of diffusion restriction to suggest acute ischemia.    Mild atrophy. There are mild foci of T2 signal hyperintensity within the   hemispheric white matter, which are nonspecific but likely related to   sequelae of microvascular disease.    There is no intraparenchymal hematoma, mass effect or midline shift.    No abnormal extra-axial fluid collections are present. There is no   diffusion abnormality to suggest acute or subacute infarction.    There is bilateral ethmoid sinus thickening.    The calvarium is intact. The visualized intraorbital compartments are   free of disease.         [facility] Division of Hospital Medicine  Milady Stanton MD  Available via MS Teams  Pager: [phone]    SUBJECTIVE / OVERNIGHT EVENTS:  Patient transferred from MICU today. He was eating lunch comfortably. Denies any weakness, numbness at the time.     ADDITIONAL REVIEW OF SYSTEMS:  REVIEW OF SYSTEMS:    CONSTITUTIONAL: No weakness, fevers or chills  EYES/ENT: No visual changes;  No vertigo or throat pain   NECK: No pain or stiffness  RESPIRATORY: No cough, wheezing, hemoptysis; No shortness of breath  CARDIOVASCULAR: No chest pain or palpitations  GASTROINTESTINAL: No abdominal or epigastric pain. No nausea, vomiting, or hematemesis; No diarrhea or constipation. No melena or hematochezia.  GENITOURINARY: No dysuria, frequency or hematuria  NEUROLOGICAL: No numbness or weakness  SKIN: No itching, rashes      MEDICATIONS  (STANDING):  apixaban 5 milliGRAM(s) Oral every 12 hours  aspirin enteric coated 81 milliGRAM(s) Oral daily  atorvastatin 80 milliGRAM(s) Oral at bedtime  chlorhexidine 2% Cloths 1 Application(s) Topical daily    MEDICATIONS  (PRN):  acetaminophen     Tablet .. 650 milliGRAM(s) Oral every 6 hours PRN Temp greater or equal to 38C (100.4F), Mild Pain (1 - 3), Moderate Pain (4 - 6)      I&O's Summary    05 Aug 2022 07:01  -  06 Aug 2022 07:00  --------------------------------------------------------  IN: 360 mL / OUT: 925 mL / NET: -565 mL        PHYSICAL EXAM:  Vital Signs Last 24 Hrs  T(C): 36.6 (06 Aug 2022 12:38), Max: 36.8 (05 Aug 2022 20:00)  T(F): 97.8 (06 Aug 2022 12:38), Max: 98.2 (05 Aug 2022 20:00)  HR: 67 (06 Aug 2022 12:38) (54 - 67)  BP: 144/97 (06 Aug 2022 12:38) (108/69 - 148/102)  BP(mean): 91 (05 Aug 2022 23:00) (79 - 110)  RR: 18 (06 Aug 2022 12:38) (16 - 21)  SpO2: 98% (06 Aug 2022 12:38) (96% - 98%)    Parameters below as of 06 Aug 2022 12:38  Patient On (Oxygen Delivery Method): room air      CONSTITUTIONAL: NAD,comfortable  EYES: PERRLA; conjunctiva and sclera clear  ENMT: Moist oral mucosa, no pharyngeal injection or exudates; normal dentition  NECK: Supple, no palpable masses; no thyromegaly  RESPIRATORY: Normal respiratory effort; lungs are clear to auscultation bilaterally  CARDIOVASCULAR: Regular rate and rhythm, normal S1 and S2, no murmur/rub/gallop; No lower extremity edema; Peripheral pulses are 2+ bilaterally  ABDOMEN: Nontender to palpation, normoactive bowel sounds, no rebound/guarding; No hepatosplenomegaly  MUSCULOSKELETAL:  Normal gait; no clubbing or cyanosis of digits; no joint swelling or tenderness to palpation  PSYCH: A+O to person, place, and time; affect appropriate  NEUROLOGY: CN 2-12 are intact and symmetric; no gross sensory deficits   SKIN: No rashes; no palpable lesions    LABS:                        14.1   5.80  )-----------( 165      ( 05 Aug 2022 06:52 )             42.0     08-05    142  |  109<H>  |  12  ----------------------------<  105<H>  3.9   |  24  |  0.96    Ca    8.7      05 Aug 2022 06:52  Phos  3.7     08-05  Mg     2.30     08-05    TPro  6.9  /  Alb  3.8  /  TBili  0.6  /  DBili  x   /  AST  33  /  ALT  25  /  AlkPhos  66  08-05    PT/INR - ( 05 Aug 2022 06:52 )   PT: 11.1 sec;   INR: 0.96 ratio         PTT - ( 05 Aug 2022 06:52 )  PTT:30.1 sec              RADIOLOGY & ADDITIONAL TESTS:  MRI brain  FINDINGS:  No current evidence of diffusion restriction to suggest acute ischemia.    Mild atrophy. There are mild foci of T2 signal hyperintensity within the   hemispheric white matter, which are nonspecific but likely related to   sequelae of microvascular disease.    There is no intraparenchymal hematoma, mass effect or midline shift.    No abnormal extra-axial fluid collections are present. There is no   diffusion abnormality to suggest acute or subacute infarction.    There is bilateral ethmoid sinus thickening.    The calvarium is intact. The visualized intraorbital compartments are   free of disease.

## 2022-08-06 NOTE — PROGRESS NOTE ADULT - ASSESSMENT
52M PMH multiple CVA and TIAs on ASA and Eliquis (last 02/06/2022 s/p tPA @ [facility], residual expressive aphasia, ?L sided deficit now resolved), HLD, and a known atrial septal defect here for L sided weakness, hypoesthesia and aphasia found to have weakness of the L hand, due to R frontal and parieto-occipital ischemic CVA s/p TPA ~23:00 8/3, admitted to MICU, repeat 24h CTH stable. Now transferred to floors, neurologically back to baseline.   52M PMH multiple CVA and TIAs on ASA and Eliquis (last 02/06/2022 s/p tPA @ [facility], residual expressive aphasia, ?L sided deficit now resolved), HLD, and a known atrial septal defect here for L sided weakness, hypoesthesia and aphasia found to have weakness of the L hand, due to R frontal and parieto-occipital ischemic CVA s/p TPA ~23:00 8/3, admitted to MICU, repeat 24h CTH stable. MRI brain without acute ischemia. Now transferred to floors, neurologically back to baseline.

## 2022-08-06 NOTE — CHART NOTE - NSCHARTNOTEFT_GEN_A_CORE
MRI Head   IMPRESSION:  *  NO CURRENT EVIDENCE FOR ACUTE INFARCT.  *  MILD ATROPHY WITH MILD CHRONIC WHITE MATTER ISCHEMIC TI      Recommendations  c/w Asa 81 mg daily and statin   c/w eliquis bid 5 mg  Rest of workup by primary team   No further neurological workup   Follow up with Dr. Castro Stroke neurologist as outpatient.     Case discussed with Dr. Heredia neurology attending.

## 2022-08-06 NOTE — PROGRESS NOTE ADULT - PROBLEM SELECTOR PLAN 1
h/o of multiple CVA's and TIA's iso known ASD. Concern for embolic etiology, however, per pt follows private cardiologist, deemed ASD too small. On ASA and Eliquis at home   CT head 8/3 R frontal and parieto-occipital ischemic CVA  - s/p TPA ~23:00 8/3  - repeat CTH was performed 11:11 8/4 for HA (stable from presentation/prior to TPA) - stable   - repeat CTH 24hrs post TPA - stable   - home Eliquis and ASA resumed   - high dose statin   - no neurological deficits at this time  - TTE w/ bubble wnl EF 60%, no obvious PFO demonstrated   - pending MRI brain w/o contrast  - allow for normotension at this time   - appreciate neuro recs  - PT: Home no needs, follow up OT h/o of multiple CVA's and TIA's iso known ASD. Concern for embolic etiology, however, per pt follows private cardiologist, deemed ASD too small. On ASA and Eliquis at home   CT head 8/3 R frontal and parieto-occipital ischemic CVA  - s/p TPA ~23:00 8/3  - repeat CTH was performed 11:11 8/4 for HA (stable from presentation/prior to TPA) - stable   - repeat CTH 24hrs post TPA - stable   - home Eliquis and ASA resumed   - high dose statin   - no neurological deficits at this time  - TTE w/ bubble wnl EF 60%, no obvious PFO demonstrated   - MRI brain w/o contrast reviewed: no evidence of ischemia  - allow for normotension at this time   - appreciate neuro recs  - PT: Home no needs, follow up OT  - start discharge planning

## 2022-08-07 ENCOUNTER — TRANSCRIPTION ENCOUNTER (OUTPATIENT)
Age: 52
End: 2022-08-07

## 2022-08-07 VITALS
SYSTOLIC BLOOD PRESSURE: 126 MMHG | DIASTOLIC BLOOD PRESSURE: 86 MMHG | OXYGEN SATURATION: 97 % | HEART RATE: 61 BPM | TEMPERATURE: 98 F | RESPIRATION RATE: 18 BRPM

## 2022-08-07 LAB
ALBUMIN SERPL ELPH-MCNC: 3.8 G/DL — SIGNIFICANT CHANGE UP (ref 3.3–5)
ALP SERPL-CCNC: 64 U/L — SIGNIFICANT CHANGE UP (ref 40–120)
ALT FLD-CCNC: 27 U/L — SIGNIFICANT CHANGE UP (ref 4–41)
ANION GAP SERPL CALC-SCNC: 11 MMOL/L — SIGNIFICANT CHANGE UP (ref 7–14)
AST SERPL-CCNC: 31 U/L — SIGNIFICANT CHANGE UP (ref 4–40)
BILIRUB SERPL-MCNC: 0.7 MG/DL — SIGNIFICANT CHANGE UP (ref 0.2–1.2)
BUN SERPL-MCNC: 18 MG/DL — SIGNIFICANT CHANGE UP (ref 7–23)
CALCIUM SERPL-MCNC: 8.6 MG/DL — SIGNIFICANT CHANGE UP (ref 8.4–10.5)
CHLORIDE SERPL-SCNC: 105 MMOL/L — SIGNIFICANT CHANGE UP (ref 98–107)
CO2 SERPL-SCNC: 24 MMOL/L — SIGNIFICANT CHANGE UP (ref 22–31)
CREAT SERPL-MCNC: 1.05 MG/DL — SIGNIFICANT CHANGE UP (ref 0.5–1.3)
EGFR: 85 ML/MIN/1.73M2 — SIGNIFICANT CHANGE UP
GLUCOSE SERPL-MCNC: 83 MG/DL — SIGNIFICANT CHANGE UP (ref 70–99)
HCT VFR BLD CALC: 43 % — SIGNIFICANT CHANGE UP (ref 39–50)
HGB BLD-MCNC: 14.7 G/DL — SIGNIFICANT CHANGE UP (ref 13–17)
MAGNESIUM SERPL-MCNC: 2.1 MG/DL — SIGNIFICANT CHANGE UP (ref 1.6–2.6)
MCHC RBC-ENTMCNC: 29.7 PG — SIGNIFICANT CHANGE UP (ref 27–34)
MCHC RBC-ENTMCNC: 34.2 GM/DL — SIGNIFICANT CHANGE UP (ref 32–36)
MCV RBC AUTO: 86.9 FL — SIGNIFICANT CHANGE UP (ref 80–100)
NRBC # BLD: 0 /100 WBCS — SIGNIFICANT CHANGE UP
NRBC # FLD: 0 K/UL — SIGNIFICANT CHANGE UP
PHOSPHATE SERPL-MCNC: 3.6 MG/DL — SIGNIFICANT CHANGE UP (ref 2.5–4.5)
PLATELET # BLD AUTO: 177 K/UL — SIGNIFICANT CHANGE UP (ref 150–400)
POTASSIUM SERPL-MCNC: 3.8 MMOL/L — SIGNIFICANT CHANGE UP (ref 3.5–5.3)
POTASSIUM SERPL-SCNC: 3.8 MMOL/L — SIGNIFICANT CHANGE UP (ref 3.5–5.3)
PROT SERPL-MCNC: 6.7 G/DL — SIGNIFICANT CHANGE UP (ref 6–8.3)
RBC # BLD: 4.95 M/UL — SIGNIFICANT CHANGE UP (ref 4.2–5.8)
RBC # FLD: 12.9 % — SIGNIFICANT CHANGE UP (ref 10.3–14.5)
SODIUM SERPL-SCNC: 140 MMOL/L — SIGNIFICANT CHANGE UP (ref 135–145)
WBC # BLD: 11.15 K/UL — HIGH (ref 3.8–10.5)
WBC # FLD AUTO: 11.15 K/UL — HIGH (ref 3.8–10.5)

## 2022-08-07 PROCEDURE — 99239 HOSP IP/OBS DSCHRG MGMT >30: CPT

## 2022-08-07 RX ORDER — ATORVASTATIN CALCIUM 80 MG/1
0.5 TABLET, FILM COATED ORAL
Qty: 0 | Refills: 0 | DISCHARGE
Start: 2022-08-07 | End: 2022-09-05

## 2022-08-07 RX ORDER — APIXABAN 2.5 MG/1
1 TABLET, FILM COATED ORAL
Qty: 0 | Refills: 0 | DISCHARGE
Start: 2022-08-07

## 2022-08-07 RX ORDER — ATORVASTATIN CALCIUM 80 MG/1
1 TABLET, FILM COATED ORAL
Qty: 30 | Refills: 0
Start: 2022-08-07 | End: 2022-09-05

## 2022-08-07 RX ORDER — ASPIRIN/CALCIUM CARB/MAGNESIUM 324 MG
1 TABLET ORAL
Qty: 0 | Refills: 0 | DISCHARGE
Start: 2022-08-07

## 2022-08-07 RX ADMIN — APIXABAN 5 MILLIGRAM(S): 2.5 TABLET, FILM COATED ORAL at 05:20

## 2022-08-07 RX ADMIN — Medication 81 MILLIGRAM(S): at 11:34

## 2022-08-07 NOTE — DISCHARGE NOTE NURSING/CASE MANAGEMENT/SOCIAL WORK - BRAND OF COVID-19 VACCINATION
Take your Keppra at home as prescribed.    Take the iron pills at home to help with anemia.    Follow-up with primary care doctor to discuss your anemia and follow-up with the neurologist to discuss your seizures.    Return to ER for any repeat seizures or any other new or recurrent symptoms.  
Pfizer dose 1 and 2

## 2022-08-07 NOTE — PROGRESS NOTE ADULT - SUBJECTIVE AND OBJECTIVE BOX
[facility] Division of Hospital Medicine  Milady Stanton MD  Available via MS Teams  Pager: [phone]    SUBJECTIVE / OVERNIGHT EVENTS:  No overnight events. He feels well, back to his usual self .    ADDITIONAL REVIEW OF SYSTEMS:  REVIEW OF SYSTEMS:    CONSTITUTIONAL: No weakness, fevers or chills  EYES/ENT: No visual changes;  No vertigo or throat pain   NECK: No pain or stiffness  RESPIRATORY: No cough, wheezing, hemoptysis; No shortness of breath  CARDIOVASCULAR: No chest pain or palpitations  GASTROINTESTINAL: No abdominal or epigastric pain. No nausea, vomiting, or hematemesis; No diarrhea or constipation. No melena or hematochezia.  GENITOURINARY: No dysuria, frequency or hematuria  NEUROLOGICAL: No numbness or weakness  SKIN: No itching, rashes      MEDICATIONS  (STANDING):  apixaban 5 milliGRAM(s) Oral every 12 hours  aspirin enteric coated 81 milliGRAM(s) Oral daily  atorvastatin 80 milliGRAM(s) Oral at bedtime  chlorhexidine 2% Cloths 1 Application(s) Topical daily    MEDICATIONS  (PRN):  acetaminophen     Tablet .. 650 milliGRAM(s) Oral every 6 hours PRN Temp greater or equal to 38C (100.4F), Mild Pain (1 - 3), Moderate Pain (4 - 6)      I&O's Summary    06 Aug 2022 07:01  -  07 Aug 2022 07:00  --------------------------------------------------------  IN: 0 mL / OUT: 2 mL / NET: -2 mL        PHYSICAL EXAM:  Vital Signs Last 24 Hrs  T(C): 36.7 (07 Aug 2022 05:15), Max: 36.7 (06 Aug 2022 20:00)  T(F): 98 (07 Aug 2022 05:15), Max: 98.1 (06 Aug 2022 20:00)  HR: 61 (07 Aug 2022 05:15) (61 - 61)  BP: 126/86 (07 Aug 2022 05:15) (126/86 - 131/89)  BP(mean): --  RR: 18 (07 Aug 2022 05:15) (18 - 18)  SpO2: 97% (07 Aug 2022 05:15) (97% - 97%)    Parameters below as of 07 Aug 2022 05:15  Patient On (Oxygen Delivery Method): room air      CONSTITUTIONAL: NAD, well-developed, well-groomed  EYES: PERRLA; conjunctiva and sclera clear  ENMT: Moist oral mucosa, no pharyngeal injection or exudates; normal dentition  NECK: Supple, no palpable masses; no thyromegaly  RESPIRATORY: Normal respiratory effort; lungs are clear to auscultation bilaterally  CARDIOVASCULAR: Regular rate and rhythm, normal S1 and S2, no murmur/rub/gallop; No lower extremity edema; Peripheral pulses are 2+ bilaterally  ABDOMEN: Nontender to palpation, normoactive bowel sounds, no rebound/guarding; No hepatosplenomegaly  MUSCULOSKELETAL:  Normal gait; no clubbing or cyanosis of digits; no joint swelling or tenderness to palpation  PSYCH: A+O to person, place, and time; affect appropriate  NEUROLOGY: CN 2-12 are intact and symmetric; no gross sensory deficits, full ROM, strength 5/5 in all extremities  SKIN: No rashes; no palpable lesions    LABS:                        14.7   11.15 )-----------( 177      ( 07 Aug 2022 05:13 )             43.0     08-07    140  |  105  |  18  ----------------------------<  83  3.8   |  24  |  1.05    Ca    8.6      07 Aug 2022 05:13  Phos  3.6     08-07  Mg     2.10     08-07    TPro  6.7  /  Alb  3.8  /  TBili  0.7  /  DBili  x   /  AST  31  /  ALT  27  /  AlkPhos  64  08-07                  RADIOLOGY & ADDITIONAL TESTS:  New Results Reviewed Today: MRI brain: no area of ischemia

## 2022-08-07 NOTE — DISCHARGE NOTE PROVIDER - HOSPITAL COURSE
Patient is a 52M PMH multiple CVA and TIAs on ASA and Eliquis (last 02/06/2022 s/p tPA @ [facility], residual expressive aphasia, ?L sided deficit now resolved), HLD, and a known atrial septal defect here for L sided weakness, hypoesthesia and aphasia found to have weakness of the L hand, due to R frontal and parieto-occipital ischemic CVA s/p TPA ~23:00 8/3, admitted to MICU, repeat 24h CTH stable. MRI brain without acute ischemia.     CVA   h/o of multiple CVA's and TIA's iso known ASD. Concern for embolic etiology, however, per pt follows private cardiologist, deemed ASD too small. On ASA and Eliquis at home   CT head 8/3 R frontal and parieto-occipital ischemic CVA  Neurology followed   - s/p TPA ~23:00 8/3  - repeat CTH was performed 11:11 8/4 for HA (stable from presentation/prior to TPA) - stable   - repeat CTH 24hrs post TPA - stable   - home Eliquis and ASA resumed   - high dose statin   - no neurological deficits at this time  - TTE w/ bubble wnl EF 60%, shows no obvious PFO demonstrated   - MRI brain w/o contrast reviewed: no evidence of ischemia  - allow for normotension at this time   - PT: Home no needs, follow up OT    Hospital course discussed with medical attending. Patient is medically stable for discharge home.

## 2022-08-07 NOTE — PROGRESS NOTE ADULT - PROBLEM SELECTOR PLAN 1
h/o of multiple CVA's and TIA's iso known ASD. Concern for embolic etiology, however, per pt follows private cardiologist, deemed ASD too small. On ASA and Eliquis at home   CT head 8/3 R frontal and parieto-occipital ischemic CVA  - s/p TPA ~23:00 8/3  - repeat CTH was performed 11:11 8/4 for HA (stable from presentation/prior to TPA) - stable   - repeat CTH 24hrs post TPA - stable   - home Eliquis and ASA resumed   - high dose statin   - no neurological deficits at this time  - TTE w/ bubble wnl EF 60%, no obvious PFO demonstrated   - MRI brain w/o contrast reviewed: no evidence of ischemia  - allow for normotension at this time   - appreciate neuro recs: c/w aspirin, eliquis, OP follow up  - PT: Home no needs  -loop recorded in place, OP cardiology follow up- continue with AC  - discharge home today

## 2022-08-07 NOTE — DISCHARGE NOTE PROVIDER - NSDCMRMEDTOKEN_GEN_ALL_CORE_FT
apixaban 5 mg oral tablet: 1 tab(s) orally every 12 hours  aspirin 81 mg oral delayed release tablet: 1 tab(s) orally once a day   apixaban 5 mg oral tablet: 1 tab(s) orally every 12 hours  aspirin 81 mg oral delayed release tablet: 1 tab(s) orally once a day  atorvastatin 80 mg oral tablet: 1 tab(s) orally once a day (at bedtime)

## 2022-08-07 NOTE — PROGRESS NOTE ADULT - ASSESSMENT
52M PMH multiple CVA and TIAs on ASA and Eliquis (last 02/06/2022 s/p tPA @ [facility], residual expressive aphasia, ?L sided deficit now resolved), HLD, and a known atrial septal defect here for L sided weakness, hypoesthesia and aphasia found to have weakness of the L hand, due to R frontal and parieto-occipital ischemic CVA s/p TPA ~23:00 8/3, admitted to MICU, repeat 24h CTH stable. MRI brain without acute ischemia. Now transferred to floors, neurologically back to baseline.

## 2022-08-07 NOTE — DISCHARGE NOTE NURSING/CASE MANAGEMENT/SOCIAL WORK - PATIENT PORTAL LINK FT
You can access the FollowMyHealth Patient Portal offered by [facility] by registering at the following website: [email or web address]. By joining [employer]’s FollowMyHealth portal, you will also be able to view your health information using other applications (apps) compatible with our system.

## 2022-08-07 NOTE — DISCHARGE NOTE NURSING/CASE MANAGEMENT/SOCIAL WORK - NSDCPEFALRISK_GEN_ALL_CORE
For information on Fall & Injury Prevention, visit: [email or web address] OR  [email or web address] OR  https://www.cdc.gov/steadi/patient.html

## 2022-08-07 NOTE — DISCHARGE NOTE PROVIDER - NSDCCPCAREPLAN_GEN_ALL_CORE_FT
PRINCIPAL DISCHARGE DIAGNOSIS  Diagnosis: CVA (cerebral vascular accident)  Assessment and Plan of Treatment: MRI Head   IMPRESSION:  *  NO CURRENT EVIDENCE FOR ACUTE INFARCT.  *  MILD ATROPHY WITH MILD CHRONIC WHITE MATTER ISCHEMIC TI  Recommendations  Continue with Asa 81 mg daily and statin   ontinue with eliquis bid 5 mg  Follow up with Dr. Castro Stroke neurologist as outpatient.       PRINCIPAL DISCHARGE DIAGNOSIS  Diagnosis: CVA (cerebral vascular accident)  Assessment and Plan of Treatment: MRI Head   IMPRESSION:  *  NO CURRENT EVIDENCE FOR ACUTE INFARCT.  *  MILD ATROPHY WITH MILD CHRONIC WHITE MATTER ISCHEMIC TI  Recommendations  Continue with Asa 81 mg daily and statin   ontinue with eliquis bid 5 mg  Follow up with Dr. Castro Stroke neurologist as outpatient.  Please follow up withoutpatient Cardiolohist within one week of discharge  WIll need Loop recorder interrogated outpatient.

## 2022-08-10 ENCOUNTER — TRANSCRIPTION ENCOUNTER (OUTPATIENT)
Age: 52
End: 2022-08-10

## 2022-08-12 PROBLEM — I63.9 CEREBRAL INFARCTION, UNSPECIFIED: Chronic | Status: ACTIVE | Noted: 2022-08-03

## 2022-08-12 PROBLEM — Z86.73 PERSONAL HISTORY OF TRANSIENT ISCHEMIC ATTACK (TIA), AND CEREBRAL INFARCTION WITHOUT RESIDUAL DEFICITS: Chronic | Status: ACTIVE | Noted: 2022-08-03

## 2022-08-12 PROBLEM — E78.5 HYPERLIPIDEMIA, UNSPECIFIED: Chronic | Status: ACTIVE | Noted: 2022-08-03

## 2022-08-18 ENCOUNTER — EMERGENCY (EMERGENCY)
Facility: HOSPITAL | Age: 52
LOS: 1 days | Discharge: HOME | End: 2022-08-18
Attending: EMERGENCY MEDICINE
Payer: COMMERCIAL

## 2022-08-18 VITALS
HEART RATE: 74 BPM | TEMPERATURE: 98 F | OXYGEN SATURATION: 96 % | DIASTOLIC BLOOD PRESSURE: 98 MMHG | WEIGHT: 210.1 LBS | HEIGHT: 75 IN | RESPIRATION RATE: 16 BRPM | SYSTOLIC BLOOD PRESSURE: 147 MMHG

## 2022-08-18 PROCEDURE — 99285 EMERGENCY DEPT VISIT HI MDM: CPT

## 2022-08-18 NOTE — ED ADULT TRIAGE NOTE - CHIEF COMPLAINT QUOTE
left lower calf swelling after walking into a mirror 2 days ago and got stitches. [facility] and noticed swelling. sent for Eval for worsening hematoma vs DVT. Recent stroke, on eliquis x2 weeks

## 2022-08-18 NOTE — ED ADULT NURSE NOTE - OBJECTIVE STATEMENT
Pt is a 52y M c/o L leg swelling. Pt injured his left lower calf 2 days ago sustained a laceration after walking into a mirror, got stiches. Pt went to  after noticing swelling in area,  sent pt to ED for further eval/ concern for DVT. Pt PMH stroke in Feb, on eliquis. Pt presents w/ noticable L lower leg swelling, swelling around site of laceration/stiches, bruising around L ankle. Pt denies fever, chills, cough, sob, NVD, cp, abd pain, dizziness, weakness, ha. Pt is A&Ox3, neuro status intact, breathing unlabored and spontaneous, abd soft nontender nondistended, full strength and ROM of all extremities. Pt resting in stretcher, bed in lowest position, aware of plan of care. Comfort and safety measures maintained.

## 2022-08-19 VITALS
SYSTOLIC BLOOD PRESSURE: 134 MMHG | DIASTOLIC BLOOD PRESSURE: 80 MMHG | HEART RATE: 62 BPM | TEMPERATURE: 98 F | RESPIRATION RATE: 16 BRPM | OXYGEN SATURATION: 98 %

## 2022-08-19 LAB
ALBUMIN SERPL ELPH-MCNC: 4.2 G/DL — SIGNIFICANT CHANGE UP (ref 3.3–5)
ALP SERPL-CCNC: 78 U/L — SIGNIFICANT CHANGE UP (ref 40–120)
ALT FLD-CCNC: 26 U/L — SIGNIFICANT CHANGE UP (ref 10–45)
ANION GAP SERPL CALC-SCNC: 10 MMOL/L — SIGNIFICANT CHANGE UP (ref 5–17)
APTT BLD: 31.4 SEC — SIGNIFICANT CHANGE UP (ref 27.5–35.5)
AST SERPL-CCNC: 21 U/L — SIGNIFICANT CHANGE UP (ref 10–40)
BASOPHILS # BLD AUTO: 0.07 K/UL — SIGNIFICANT CHANGE UP (ref 0–0.2)
BASOPHILS NFR BLD AUTO: 0.8 % — SIGNIFICANT CHANGE UP (ref 0–2)
BILIRUB SERPL-MCNC: 0.4 MG/DL — SIGNIFICANT CHANGE UP (ref 0.2–1.2)
BUN SERPL-MCNC: 17 MG/DL — SIGNIFICANT CHANGE UP (ref 7–23)
CALCIUM SERPL-MCNC: 9 MG/DL — SIGNIFICANT CHANGE UP (ref 8.4–10.5)
CHLORIDE SERPL-SCNC: 104 MMOL/L — SIGNIFICANT CHANGE UP (ref 96–108)
CO2 SERPL-SCNC: 27 MMOL/L — SIGNIFICANT CHANGE UP (ref 22–31)
CREAT SERPL-MCNC: 1.13 MG/DL — SIGNIFICANT CHANGE UP (ref 0.5–1.3)
EGFR: 78 ML/MIN/1.73M2 — SIGNIFICANT CHANGE UP
EOSINOPHIL # BLD AUTO: 0.5 K/UL — SIGNIFICANT CHANGE UP (ref 0–0.5)
EOSINOPHIL NFR BLD AUTO: 5.6 % — SIGNIFICANT CHANGE UP (ref 0–6)
GLUCOSE SERPL-MCNC: 103 MG/DL — HIGH (ref 70–99)
HCT VFR BLD CALC: 39.8 % — SIGNIFICANT CHANGE UP (ref 39–50)
HGB BLD-MCNC: 13.2 G/DL — SIGNIFICANT CHANGE UP (ref 13–17)
IMM GRANULOCYTES NFR BLD AUTO: 0.4 % — SIGNIFICANT CHANGE UP (ref 0–1.5)
INR BLD: 1.09 RATIO — SIGNIFICANT CHANGE UP (ref 0.88–1.16)
LYMPHOCYTES # BLD AUTO: 1.64 K/UL — SIGNIFICANT CHANGE UP (ref 1–3.3)
LYMPHOCYTES # BLD AUTO: 18.4 % — SIGNIFICANT CHANGE UP (ref 13–44)
MCHC RBC-ENTMCNC: 29.4 PG — SIGNIFICANT CHANGE UP (ref 27–34)
MCHC RBC-ENTMCNC: 33.2 GM/DL — SIGNIFICANT CHANGE UP (ref 32–36)
MCV RBC AUTO: 88.6 FL — SIGNIFICANT CHANGE UP (ref 80–100)
MONOCYTES # BLD AUTO: 0.72 K/UL — SIGNIFICANT CHANGE UP (ref 0–0.9)
MONOCYTES NFR BLD AUTO: 8.1 % — SIGNIFICANT CHANGE UP (ref 2–14)
NEUTROPHILS # BLD AUTO: 5.92 K/UL — SIGNIFICANT CHANGE UP (ref 1.8–7.4)
NEUTROPHILS NFR BLD AUTO: 66.7 % — SIGNIFICANT CHANGE UP (ref 43–77)
NRBC # BLD: 0 /100 WBCS — SIGNIFICANT CHANGE UP (ref 0–0)
PLATELET # BLD AUTO: 185 K/UL — SIGNIFICANT CHANGE UP (ref 150–400)
POTASSIUM SERPL-MCNC: 3.9 MMOL/L — SIGNIFICANT CHANGE UP (ref 3.5–5.3)
POTASSIUM SERPL-SCNC: 3.9 MMOL/L — SIGNIFICANT CHANGE UP (ref 3.5–5.3)
PROT SERPL-MCNC: 6.9 G/DL — SIGNIFICANT CHANGE UP (ref 6–8.3)
PROTHROM AB SERPL-ACNC: 12.6 SEC — SIGNIFICANT CHANGE UP (ref 10.5–13.4)
RBC # BLD: 4.49 M/UL — SIGNIFICANT CHANGE UP (ref 4.2–5.8)
RBC # FLD: 12.6 % — SIGNIFICANT CHANGE UP (ref 10.3–14.5)
SODIUM SERPL-SCNC: 141 MMOL/L — SIGNIFICANT CHANGE UP (ref 135–145)
WBC # BLD: 8.89 K/UL — SIGNIFICANT CHANGE UP (ref 3.8–10.5)
WBC # FLD AUTO: 8.89 K/UL — SIGNIFICANT CHANGE UP (ref 3.8–10.5)

## 2022-08-19 PROCEDURE — 85025 COMPLETE CBC W/AUTO DIFF WBC: CPT

## 2022-08-19 PROCEDURE — 85610 PROTHROMBIN TIME: CPT

## 2022-08-19 PROCEDURE — 99284 EMERGENCY DEPT VISIT MOD MDM: CPT | Mod: 25

## 2022-08-19 PROCEDURE — 80053 COMPREHEN METABOLIC PANEL: CPT

## 2022-08-19 PROCEDURE — 93971 EXTREMITY STUDY: CPT | Mod: 26,LT

## 2022-08-19 PROCEDURE — 93971 EXTREMITY STUDY: CPT

## 2022-08-19 PROCEDURE — 85730 THROMBOPLASTIN TIME PARTIAL: CPT

## 2022-08-19 NOTE — ED PROVIDER NOTE - OBJECTIVE STATEMENT
51yo man with PMH HLD, stroke in february for which he is on Eliquis, presenting with left lower leg edema 51yo man with PMH HLD, stroke in february for which he is on Eliquis, presenting with left lower leg edema 5 days after a left leg injury post mirror trauma that required 11 stitiches at urgent care. Patient received abx that he finished today 53yo man with PMH HLD, stroke in february for which he is on Eliquis, presenting with left lower leg edema 5 days after a left leg injury post mirror trauma that required 11 stitches at urgent care. At that time patient received abx that he finished today, tetanus shot. 2 days later noticed increased swelling at stitches site, now noticing swelling around ankles with blue/black skin discoloration. Denies fevers, chest pain, SOB, pus drainage from stitches site.

## 2022-08-19 NOTE — ED PROVIDER NOTE - NSFOLLOWUPINSTRUCTIONS_ED_ALL_ED_FT
You were seen in the Emergency Room today because of left leg swelling. No clot was found in your leg. A copy of your results is included in your discharge paperwork.     Please follow-up with your Primary Care Physician within the week.   Also be sure to keep the appointment you have with your hematologist later today.     If your leg swelling does not improve or gets worse within the week, please come back for a repeat ultrasound.     DISCHARGE INSTRUCTIONS:  Call your local emergency number (911 in the [place]) if:   •You feel lightheaded, short of breath, and have chest pain.  •You cough up blood.    Call your doctor if:   •Your arm or leg feels warm, tender, and painful. It may look swollen and red.  •You have questions or concerns about your condition or care.    Prevent a DVT:   •Exercise regularly to help increase your blood flow. Walking is a good low-impact exercise. Talk to your healthcare provider about the best exercise plan for you.  •Change your body position or move around often. Move and stretch in your seat several times each hour if you travel by car or work at a desk.   •Maintain a healthy weight. Ask your healthcare provider what a healthy weight is for you. Ask him or her to help you create a weight loss plan if you are overweight.  •Do not smoke. Nicotine and other chemicals in cigarettes and cigars can damage blood vessels and make it more difficult to manage your DVT.  •Ask about birth control if you are a woman who takes the pill. A birth control pill increases the risk for PE in certain women. The risk is higher if you are also older than 35, smoke cigarettes, or have a blood clotting disorder. Talk to your healthcare provider about other ways to prevent pregnancy, such as a cervical cap or intrauterine device (IUD).    Follow up with your doctor or specialist as directed: You may need to come in regularly for scans to check for blood clots. Your blood may be checked to see how long it takes to clot. Your doctor or specialist will tell you if you need to have this test and how often to have it. Write down your questions so you remember to ask them during your visits.

## 2022-08-19 NOTE — ED PROVIDER NOTE - COVID-19 RESULT DATE/TIME
Follow up at [address] [place] [address] next month    RETURN to office before then if signs of an ear infection have recurred (ie, ear pulling, especially her RIGHT EAR, fussiness, restless sleep, ear discharge, fever)           Middle Ear Fluid in Children: Care Instructions  Your Care Instructions     Fluid often builds up inside the ear during a cold or allergies. Usually the fluid drains away, but sometimes a small tube in the ear, called the eustachian tube, stays blocked for months. Symptoms of fluid buildup may include:  · Popping, ringing, or a feeling of fullness or pressure in the ear. Children often have trouble describing this feeling. They may rub their ears trying to relieve the pressure. · Trouble hearing. Children who have problems hearing may seem like they are not paying attention. Or they may be grumpy or cranky. · Balance problems and dizziness. In most cases, you can treat your child at home. Follow-up care is a key part of your child's treatment and safety. Be sure to make and go to all appointments, and call your doctor if your child is having problems. It's also a good idea to know your child's test results and keep a list of the medicines your child takes. How can you care for your child at home? · In most children, the fluid clears up within a few months without treatment. Have your child's hearing tested if the fluid lasts longer than 3 months. · If the doctor prescribed antibiotics for your child, give them as directed. Do not stop using them just because your child feels better. Your child needs to take the full course of antibiotics. When should you call for help? Call your doctor now or seek immediate medical care if:    · Your child has symptoms of infection, such as:  ? Increased pain, swelling, warmth, or redness. ? Pus draining from the area. ? A fever.    Watch closely for changes in your child's health, and be sure to contact your doctor if:    · Your child has changes in hearing.     · Your child does not get better as expected. Where can you learn more? Go to http://www.gray.com/  Enter G128 in the search box to learn more about \"Middle Ear Fluid in Children: Care Instructions. \"  Current as of: April 15, 2020               Content Version: 12.6  © 2146-7340 [employer]. Care instructions adapted under license by [employer] (which disclaims liability or warranty for this information). If you have questions about a medical condition or this instruction, always ask your healthcare professional. [address] any warranty or liability for your use of this information. 03-Aug-2022 23:51

## 2022-08-19 NOTE — ED PROVIDER NOTE - NS ED ROS FT
GENERAL: No fever, no chills  EYES: No change in vision  HEENT: No trouble swallowing or speaking  CARDIAC: No chest pain  PULMONARY: No cough, no SOB  GI: No abdominal pain, no nausea, no vomiting, no diarrhea, no constipation  : No changes in urination  SKIN: +skin changes   NEURO: No headache, no numbness  MSK: No joint pain  Otherwise as HPI or negative.

## 2022-08-19 NOTE — ED PROVIDER NOTE - ATTENDING CONTRIBUTION TO CARE
pt with LLE edema and pain s/p lac to leg repaired last week. pt is on eliquis for CVA. on exam, pt does have diffuse swelling of leg below knee, no significant tenderness, intact distal pulses and cap refill, neuro intact. no significant erythema around stitches. no clinical signs of cellulitis or compartment syndrome. will obtain U/S to asses for possible hematoma vs clot (unlikely as pt is on thinners). no clinical signs of severe blood loss anemia and location not likely to cause life-threatening bleeding.

## 2022-08-19 NOTE — ED PROVIDER NOTE - PROGRESS NOTE DETAILS
Mikcey, PGY2 - no dvt seen on u/s, no significant hematoma. Patient stable for discharge. Understands the Emergency Room work-up and discharge precautions. Will follow-up with heme and pcp

## 2022-08-19 NOTE — ED PROVIDER NOTE - PHYSICAL EXAMINATION
Gen: NAD, AOx3, able to make needs known, non-toxic  Head: NCAT  HEENT: EOMI, normal conjunctiva  Lung: no respiratory distress, speaking in full sentences  CV: pulses bilaterally   MSK: no visible bony deformities  Neuro: No focal sensory or motor deficits  Skin: Warm, well perfused. Erythema surround suture site, blue/black skin changes surrounding ankles and mildly over the medial calf. no tenderness with palpation of calf. pulses intact bilaterally. L lower leg swelling significant   Psych: normal affect

## 2022-08-19 NOTE — ED PROVIDER NOTE - CLINICAL SUMMARY MEDICAL DECISION MAKING FREE TEXT BOX
Mickey, PGY2 - left lower leg swelling, on eliquis for stroke/tias in the past, consider hemorrhage 2/2 skin changes vs DVT, decreased suspicion for cellulitis consider erythema only surrounding stitch site and no other region. labs, u/s, reassess. *The above represents an initial assessment/impression. Please refer to progress notes for potential changes in patient clinical course*

## 2022-08-20 ENCOUNTER — EMERGENCY (EMERGENCY)
Facility: HOSPITAL | Age: 52
LOS: 1 days | Discharge: HOME | End: 2022-08-20
Attending: EMERGENCY MEDICINE
Payer: COMMERCIAL

## 2022-08-20 VITALS
DIASTOLIC BLOOD PRESSURE: 90 MMHG | SYSTOLIC BLOOD PRESSURE: 132 MMHG | RESPIRATION RATE: 18 BRPM | HEART RATE: 84 BPM | OXYGEN SATURATION: 99 %

## 2022-08-20 VITALS
SYSTOLIC BLOOD PRESSURE: 123 MMHG | HEART RATE: 89 BPM | TEMPERATURE: 98 F | OXYGEN SATURATION: 96 % | HEIGHT: 75 IN | DIASTOLIC BLOOD PRESSURE: 89 MMHG | WEIGHT: 210.1 LBS | RESPIRATION RATE: 18 BRPM

## 2022-08-20 LAB
ALBUMIN SERPL ELPH-MCNC: 4.2 G/DL — SIGNIFICANT CHANGE UP (ref 3.3–5)
ALP SERPL-CCNC: 82 U/L — SIGNIFICANT CHANGE UP (ref 40–120)
ALT FLD-CCNC: 25 U/L — SIGNIFICANT CHANGE UP (ref 10–45)
ANION GAP SERPL CALC-SCNC: 12 MMOL/L — SIGNIFICANT CHANGE UP (ref 5–17)
APPEARANCE UR: ABNORMAL
AST SERPL-CCNC: 18 U/L — SIGNIFICANT CHANGE UP (ref 10–40)
BACTERIA # UR AUTO: 0 — SIGNIFICANT CHANGE UP
BASOPHILS # BLD AUTO: 0.06 K/UL — SIGNIFICANT CHANGE UP (ref 0–0.2)
BASOPHILS NFR BLD AUTO: 0.8 % — SIGNIFICANT CHANGE UP (ref 0–2)
BILIRUB SERPL-MCNC: 0.4 MG/DL — SIGNIFICANT CHANGE UP (ref 0.2–1.2)
BILIRUB UR-MCNC: NEGATIVE — SIGNIFICANT CHANGE UP
BUN SERPL-MCNC: 17 MG/DL — SIGNIFICANT CHANGE UP (ref 7–23)
CALCIUM SERPL-MCNC: 8.9 MG/DL — SIGNIFICANT CHANGE UP (ref 8.4–10.5)
CHLORIDE SERPL-SCNC: 105 MMOL/L — SIGNIFICANT CHANGE UP (ref 96–108)
CK SERPL-CCNC: 149 U/L — SIGNIFICANT CHANGE UP (ref 30–200)
CO2 SERPL-SCNC: 23 MMOL/L — SIGNIFICANT CHANGE UP (ref 22–31)
COLOR SPEC: ABNORMAL
CREAT SERPL-MCNC: 1.05 MG/DL — SIGNIFICANT CHANGE UP (ref 0.5–1.3)
CRP SERPL-MCNC: <3 MG/L — SIGNIFICANT CHANGE UP (ref 0–4)
DIFF PNL FLD: ABNORMAL
EGFR: 85 ML/MIN/1.73M2 — SIGNIFICANT CHANGE UP
EOSINOPHIL # BLD AUTO: 0.42 K/UL — SIGNIFICANT CHANGE UP (ref 0–0.5)
EOSINOPHIL NFR BLD AUTO: 5.3 % — SIGNIFICANT CHANGE UP (ref 0–6)
EPI CELLS # UR: 1 /HPF — SIGNIFICANT CHANGE UP
GLUCOSE SERPL-MCNC: 115 MG/DL — HIGH (ref 70–99)
GLUCOSE UR QL: NEGATIVE — SIGNIFICANT CHANGE UP
HCT VFR BLD CALC: 39.7 % — SIGNIFICANT CHANGE UP (ref 39–50)
HGB BLD-MCNC: 13.3 G/DL — SIGNIFICANT CHANGE UP (ref 13–17)
HYALINE CASTS # UR AUTO: 1 /LPF — SIGNIFICANT CHANGE UP (ref 0–2)
IMM GRANULOCYTES NFR BLD AUTO: 0.4 % — SIGNIFICANT CHANGE UP (ref 0–1.5)
KETONES UR-MCNC: NEGATIVE — SIGNIFICANT CHANGE UP
LEUKOCYTE ESTERASE UR-ACNC: NEGATIVE — SIGNIFICANT CHANGE UP
LYMPHOCYTES # BLD AUTO: 1.21 K/UL — SIGNIFICANT CHANGE UP (ref 1–3.3)
LYMPHOCYTES # BLD AUTO: 15.3 % — SIGNIFICANT CHANGE UP (ref 13–44)
MAGNESIUM SERPL-MCNC: 2.3 MG/DL — SIGNIFICANT CHANGE UP (ref 1.6–2.6)
MCHC RBC-ENTMCNC: 29.1 PG — SIGNIFICANT CHANGE UP (ref 27–34)
MCHC RBC-ENTMCNC: 33.5 GM/DL — SIGNIFICANT CHANGE UP (ref 32–36)
MCV RBC AUTO: 86.9 FL — SIGNIFICANT CHANGE UP (ref 80–100)
MONOCYTES # BLD AUTO: 0.72 K/UL — SIGNIFICANT CHANGE UP (ref 0–0.9)
MONOCYTES NFR BLD AUTO: 9.1 % — SIGNIFICANT CHANGE UP (ref 2–14)
NEUTROPHILS # BLD AUTO: 5.45 K/UL — SIGNIFICANT CHANGE UP (ref 1.8–7.4)
NEUTROPHILS NFR BLD AUTO: 69.1 % — SIGNIFICANT CHANGE UP (ref 43–77)
NITRITE UR-MCNC: NEGATIVE — SIGNIFICANT CHANGE UP
NRBC # BLD: 0 /100 WBCS — SIGNIFICANT CHANGE UP (ref 0–0)
PH UR: 6.5 — SIGNIFICANT CHANGE UP (ref 5–8)
PLATELET # BLD AUTO: 187 K/UL — SIGNIFICANT CHANGE UP (ref 150–400)
POTASSIUM SERPL-MCNC: 4.1 MMOL/L — SIGNIFICANT CHANGE UP (ref 3.5–5.3)
POTASSIUM SERPL-SCNC: 4.1 MMOL/L — SIGNIFICANT CHANGE UP (ref 3.5–5.3)
PROT SERPL-MCNC: 7 G/DL — SIGNIFICANT CHANGE UP (ref 6–8.3)
PROT UR-MCNC: ABNORMAL
RBC # BLD: 4.57 M/UL — SIGNIFICANT CHANGE UP (ref 4.2–5.8)
RBC # FLD: 12.8 % — SIGNIFICANT CHANGE UP (ref 10.3–14.5)
RBC CASTS # UR COMP ASSIST: 1217 /HPF — HIGH (ref 0–4)
SODIUM SERPL-SCNC: 140 MMOL/L — SIGNIFICANT CHANGE UP (ref 135–145)
SP GR SPEC: 1.02 — SIGNIFICANT CHANGE UP (ref 1.01–1.02)
UROBILINOGEN FLD QL: NEGATIVE — SIGNIFICANT CHANGE UP
WBC # BLD: 7.89 K/UL — SIGNIFICANT CHANGE UP (ref 3.8–10.5)
WBC # FLD AUTO: 7.89 K/UL — SIGNIFICANT CHANGE UP (ref 3.8–10.5)
WBC UR QL: 8 /HPF — HIGH (ref 0–5)

## 2022-08-20 PROCEDURE — 99284 EMERGENCY DEPT VISIT MOD MDM: CPT

## 2022-08-20 PROCEDURE — 85025 COMPLETE CBC W/AUTO DIFF WBC: CPT

## 2022-08-20 PROCEDURE — 99283 EMERGENCY DEPT VISIT LOW MDM: CPT

## 2022-08-20 PROCEDURE — 83735 ASSAY OF MAGNESIUM: CPT

## 2022-08-20 PROCEDURE — 81001 URINALYSIS AUTO W/SCOPE: CPT

## 2022-08-20 PROCEDURE — 36415 COLL VENOUS BLD VENIPUNCTURE: CPT

## 2022-08-20 PROCEDURE — 80053 COMPREHEN METABOLIC PANEL: CPT

## 2022-08-20 PROCEDURE — 87086 URINE CULTURE/COLONY COUNT: CPT

## 2022-08-20 PROCEDURE — 82550 ASSAY OF CK (CPK): CPT

## 2022-08-20 PROCEDURE — 86140 C-REACTIVE PROTEIN: CPT

## 2022-08-20 NOTE — ED PROVIDER NOTE - PHYSICAL EXAMINATION
GENERAL: well appearing in no acute distress, non-toxic appearing  HEAD: normocephalic, atraumatic  HEENT: normal conjunctiva, oral mucosa moist  CARDIAC: regular rate and rhythm, normal S1S2, no appreciable murmurs, 2+ pulses in LE b/l  PULM: normal breath sounds, clear to ascultation bilaterally, no rales, rhonchi, wheezing  GI: abdomen nondistended, soft, nontender, no guarding, rebound tenderness  : no CVA tenderness b/l, no suprapubic tenderness  NEURO: no focal motor or sensory deficits, CN2-12 intact, normal speech, PERRLA, EOMI, normal gait, AAOx3  MSK: no peripheral edema, no calf tenderness b/l  SKIN: well-perfused, extremities warm, no visible rashes  PSYCH: appropriate mood and affect GENERAL: well appearing in no acute distress, non-toxic appearing  HEAD: normocephalic, atraumatic  HEENT: normal conjunctiva, oral mucosa moist  CARDIAC: regular rate and rhythm, normal S1S2, no appreciable murmurs, 2+ pulses in LE b/l  PULM: normal breath sounds, clear to ascultation bilaterally, no rales, rhonchi, wheezing  GI: abdomen nondistended, soft, nontender, no guarding, rebound tenderness  : no CVA tenderness b/l, no suprapubic tenderness  NEURO: moving all 4 extremities, normal speech, AAOx3  MSK: no peripheral edema, no calf tenderness b/l  SKIN: well-perfused, extremities warm, no visible rashes  PSYCH: appropriate mood and affect

## 2022-08-20 NOTE — ED ADULT NURSE NOTE - NSIMPLEMENTINTERV_GEN_ALL_ED
Implemented All Fall with Harm Risk Interventions:  [place] to call system. Call bell, personal items and telephone within reach. Instruct patient to call for assistance. Room bathroom lighting operational. Non-slip footwear when patient is off stretcher. Physically safe environment: no spills, clutter or unnecessary equipment. Stretcher in lowest position, wheels locked, appropriate side rails in place. Provide visual cue, wrist band, yellow gown, etc. Monitor gait and stability. Monitor for mental status changes and reorient to person, place, and time. Review medications for side effects contributing to fall risk. Reinforce activity limits and safety measures with patient and family. Provide visual clues: red socks.

## 2022-08-20 NOTE — ED ADULT NURSE NOTE - PRIMARY CARE PROVIDER
[facility]  Magnesium Check Note    Subjective:     Patient is feeling well aside from occasional dizziness, feeling tired and hot attributable to Mag infusion, otherwise not feeling sick.  Denies headache, vision changes/spots in vision, chest pain, shortness of breath, chills, RUQ or epigastric pain.     Objective:  Patient Vitals for the past 4 hrs:   BP Temp Temp src Resp SpO2   18 1758 126/75 98.2  F (36.8  C) Oral 16 100 %     Gen: Well appearing, in no acute distress  CV:  RRR, no murmurs  Pulm:  CTAB, no wheezes or crackles  Abd:  Gravid, soft, mild fundal tenderness above CS site, no redness or warmness to touch  Ext:  Patellar reflexes +2 b/l, neg clonus b/l, Trace LE edema b/l    Baby delivered    I/O:  I/O last 3 completed shifts:  In: 4110.65 [P.O.:1850; I.V.:2260.65]  Out: 8668 [Urine:4200; Blood:4468]    Assessment/Plan:  Sherlyn Johnsonormack is a 25 year old  at 40w5d by who is PPD#0 s/p PTLCS with a pregnancy h/o preeclampsia w/ SF currently on magnesium sulfate. Patient without signs or symptoms of magnesium toxicity.     Preeclampsia with severe features  - BP's normotensive, except for 1 reading (87/67 1100) Continue to monitor and treat severe range prn  - S/p Mag 4g load> 2g/hr, now running at 1.5 g/hr given elevated Cr and last Mg level of 7.6. No s/s of mag toxicity.   - HELLP labs Cr 1.1>1.35>1.44>1.41, AST 39>61>52>45, ALT 39>74>38>33, Plts 145>127>113>122. Will continue to trend HELLP labs q6h given worsening creatinine, next at 0000 8/15.   - Plan to discontinue magnesium at 24 hours postpartum (0230)  - Strict I&O, UOP excellent  - Continue q4h clinical mag checks, next at 2220    Elevated WBC:  -WBC trending down. 24.7 from 26.5 today at 11am. Patient afebrile, no chills, no chest pain, mild fundal pain due to CS. No redness or warm touch at incision site.  -Lactate 4.1 (1823)  -UA/UC pending    Kaia Doyle acted as scribe.      Kaia Doyle MS3  8/14/2018 8:12 PM    Patient was seen and examined by me. Note was reviewed and edited by me.   Alicia Myhre, DO  OB/GYN Resident, PGY-2  8/14/2018 7:33 PM       does not have one

## 2022-08-20 NOTE — ED PROVIDER NOTE - CLINICAL SUMMARY MEDICAL DECISION MAKING FREE TEXT BOX
Rey PGY1 - 53 yo M w/ h/o TIA, CVA on eliquis, HLD p/w painless hematuria for the past 2 days.  Likely due to eliquis, because no other sx, abdominal pain, flank pain, systemic signs of infx.  Labs, UA/Ucx ordered.

## 2022-08-20 NOTE — ED PROVIDER NOTE - NSFOLLOWUPINSTRUCTIONS_ED_ALL_ED_FT
You can follow up with the Urology Group listed below    [facility]  [address] [place] [address], [address]  [place] [address]  [phone]    Hematuria WHAT YOU NEED TO KNOW:    Hematuria is blood in your urine. Your urine may be bright red to dark brown.    DISCHARGE INSTRUCTIONS:    Return to the emergency department if:    You have blood in your urine after a new injury, such as a fall.    You have severe back or side pain that does not go away with treatment.  Call your doctor if:    You are urinating very small amounts or not at all.    You feel like you cannot empty your bladder.    You have a fever that gets worse or does not go away with treatment.    You cannot keep liquids or medicines down.    Your urine gets darker, even after you drink extra liquids.    You have questions or concerns about your condition, treatment, or care.  Drink liquids as directed: You may need to drink extra liquids to help flush the blood from your body through your urine. Water is the best liquid to drink. Ask how much liquid to drink each day and which liquids are best for you.    Follow up with your doctor as directed: Write down your questions so you remember to ask them during your visits.

## 2022-08-20 NOTE — ED PROVIDER NOTE - PATIENT PORTAL LINK FT
You can access the FollowMyHealth Patient Portal offered by [facility] by registering at the following website: [email or web address]. By joining [employer]’s FollowMyHealth portal, you will also be able to view your health information using other applications (apps) compatible with our system.

## 2022-08-20 NOTE — ED PROVIDER NOTE - ATTENDING CONTRIBUTION TO CARE
51 yo male on eliquis p/w painless hematuria.  no evidence of obstruction.  labs OK, no signs of UTI.  established with outpatient urologist.  stable for discharge and outpatient management, return precautions for pelvic pain/signs of obstruction.
no

## 2022-08-20 NOTE — ED PROVIDER NOTE - OBJECTIVE STATEMENT
Le PGY1 - 53 yo M w/ h/o TIA, CVA on eliquis, HLD p/w painless hematuria for the past 2 days.  Patient noticed darker urination over the past few days not associated with abdominal pain, flank pain, pain with urination, no change in urinary frequency and no excessive exercise or prolonged time down. No headache, CP, SOB, n/v/d/abdominal pain, dysuria, peripheral edema, rashes, fever/chills.

## 2022-08-20 NOTE — ED ADULT NURSE NOTE - OBJECTIVE STATEMENT
51 yo male PMH CVA on Eliquis/ASA, A&ox3, presents to ED form home c/o hematuria since last night. Pt reports noticing dark colored urine, no blood clots since last night, unable to reach his urologist. Breathing even and unlabored, abdomen soft and nontender x 4 quads, slight swelling to Left leg s/p laceration to shin last saturday got 11 stitches in, was seen at ED to r/o DVT, US clear that day. Pt denies chest pain, palpitations, shortness of breath, headache, visual disturbances, numbness/tingling, fever, chills, diaphoresis,  nausea, vomiting, constipation, diarrhea.

## 2022-08-21 LAB
CULTURE RESULTS: SIGNIFICANT CHANGE UP
SPECIMEN SOURCE: SIGNIFICANT CHANGE UP

## 2022-09-06 DIAGNOSIS — R47.01 APHASIA: ICD-10-CM

## 2022-09-08 ENCOUNTER — APPOINTMENT (OUTPATIENT)
Dept: CARDIOLOGY | Facility: CLINIC | Age: 52
End: 2022-09-08

## 2022-09-08 ENCOUNTER — NON-APPOINTMENT (OUTPATIENT)
Age: 52
End: 2022-09-08

## 2022-09-08 VITALS
OXYGEN SATURATION: 98 % | HEIGHT: 74 IN | WEIGHT: 210 LBS | BODY MASS INDEX: 26.95 KG/M2 | DIASTOLIC BLOOD PRESSURE: 77 MMHG | HEART RATE: 95 BPM | SYSTOLIC BLOOD PRESSURE: 116 MMHG

## 2022-09-08 DIAGNOSIS — R94.39 ABNORMAL RESULT OF OTHER CARDIOVASCULAR FUNCTION STUDY: ICD-10-CM

## 2022-09-08 PROCEDURE — 99205 OFFICE O/P NEW HI 60 MIN: CPT | Mod: 25

## 2022-09-08 PROCEDURE — 93000 ELECTROCARDIOGRAM COMPLETE: CPT

## 2022-09-13 ENCOUNTER — EMERGENCY (EMERGENCY)
Facility: HOSPITAL | Age: 52
LOS: 1 days | Discharge: HOME | End: 2022-09-13
Payer: COMMERCIAL

## 2022-09-13 VITALS
OXYGEN SATURATION: 97 % | DIASTOLIC BLOOD PRESSURE: 97 MMHG | RESPIRATION RATE: 20 BRPM | TEMPERATURE: 98 F | WEIGHT: 210.1 LBS | HEART RATE: 77 BPM | HEIGHT: 75 IN | SYSTOLIC BLOOD PRESSURE: 136 MMHG

## 2022-09-13 VITALS
TEMPERATURE: 98 F | OXYGEN SATURATION: 100 % | HEART RATE: 63 BPM | DIASTOLIC BLOOD PRESSURE: 95 MMHG | SYSTOLIC BLOOD PRESSURE: 127 MMHG | RESPIRATION RATE: 20 BRPM

## 2022-09-13 LAB
ANION GAP SERPL CALC-SCNC: 14 MMOL/L — SIGNIFICANT CHANGE UP (ref 5–17)
APPEARANCE UR: CLEAR — SIGNIFICANT CHANGE UP
BACTERIA # UR AUTO: ABNORMAL
BILIRUB UR-MCNC: ABNORMAL
BUN SERPL-MCNC: 13 MG/DL — SIGNIFICANT CHANGE UP (ref 7–23)
CALCIUM SERPL-MCNC: 8.8 MG/DL — SIGNIFICANT CHANGE UP (ref 8.4–10.5)
CHLORIDE SERPL-SCNC: 103 MMOL/L — SIGNIFICANT CHANGE UP (ref 96–108)
CO2 SERPL-SCNC: 21 MMOL/L — LOW (ref 22–31)
COLOR SPEC: ABNORMAL
CREAT SERPL-MCNC: 0.84 MG/DL — SIGNIFICANT CHANGE UP (ref 0.5–1.3)
DIFF PNL FLD: NEGATIVE — SIGNIFICANT CHANGE UP
EGFR: 105 ML/MIN/1.73M2 — SIGNIFICANT CHANGE UP
EPI CELLS # UR: 0 /HPF — SIGNIFICANT CHANGE UP
GLUCOSE SERPL-MCNC: 103 MG/DL — HIGH (ref 70–99)
GLUCOSE UR QL: NEGATIVE — SIGNIFICANT CHANGE UP
HCT VFR BLD CALC: 37.9 % — LOW (ref 39–50)
HGB BLD-MCNC: 12.8 G/DL — LOW (ref 13–17)
HYALINE CASTS # UR AUTO: 0 /LPF — SIGNIFICANT CHANGE UP (ref 0–2)
KETONES UR-MCNC: NEGATIVE — SIGNIFICANT CHANGE UP
LEUKOCYTE ESTERASE UR-ACNC: ABNORMAL
MCHC RBC-ENTMCNC: 28.8 PG — SIGNIFICANT CHANGE UP (ref 27–34)
MCHC RBC-ENTMCNC: 33.8 GM/DL — SIGNIFICANT CHANGE UP (ref 32–36)
MCV RBC AUTO: 85.4 FL — SIGNIFICANT CHANGE UP (ref 80–100)
NITRITE UR-MCNC: POSITIVE
NRBC # BLD: 0 /100 WBCS — SIGNIFICANT CHANGE UP (ref 0–0)
PH UR: 6.5 — SIGNIFICANT CHANGE UP (ref 5–8)
PLATELET # BLD AUTO: 176 K/UL — SIGNIFICANT CHANGE UP (ref 150–400)
POTASSIUM SERPL-MCNC: 3.7 MMOL/L — SIGNIFICANT CHANGE UP (ref 3.5–5.3)
POTASSIUM SERPL-SCNC: 3.7 MMOL/L — SIGNIFICANT CHANGE UP (ref 3.5–5.3)
PROT UR-MCNC: NEGATIVE — SIGNIFICANT CHANGE UP
RBC # BLD: 4.44 M/UL — SIGNIFICANT CHANGE UP (ref 4.2–5.8)
RBC # FLD: 13.2 % — SIGNIFICANT CHANGE UP (ref 10.3–14.5)
RBC CASTS # UR COMP ASSIST: 4 /HPF — SIGNIFICANT CHANGE UP (ref 0–4)
SODIUM SERPL-SCNC: 138 MMOL/L — SIGNIFICANT CHANGE UP (ref 135–145)
SP GR SPEC: 1.01 — SIGNIFICANT CHANGE UP (ref 1.01–1.02)
UROBILINOGEN FLD QL: ABNORMAL
WBC # BLD: 6.42 K/UL — SIGNIFICANT CHANGE UP (ref 3.8–10.5)
WBC # FLD AUTO: 6.42 K/UL — SIGNIFICANT CHANGE UP (ref 3.8–10.5)
WBC UR QL: 18 /HPF — HIGH (ref 0–5)

## 2022-09-13 PROCEDURE — 99285 EMERGENCY DEPT VISIT HI MDM: CPT

## 2022-09-13 PROCEDURE — 85027 COMPLETE CBC AUTOMATED: CPT

## 2022-09-13 PROCEDURE — 99284 EMERGENCY DEPT VISIT MOD MDM: CPT | Mod: 25

## 2022-09-13 PROCEDURE — 76775 US EXAM ABDO BACK WALL LIM: CPT | Mod: 26

## 2022-09-13 PROCEDURE — 76775 US EXAM ABDO BACK WALL LIM: CPT

## 2022-09-13 PROCEDURE — 87086 URINE CULTURE/COLONY COUNT: CPT

## 2022-09-13 PROCEDURE — 96374 THER/PROPH/DIAG INJ IV PUSH: CPT

## 2022-09-13 PROCEDURE — 80048 BASIC METABOLIC PNL TOTAL CA: CPT

## 2022-09-13 PROCEDURE — 81001 URINALYSIS AUTO W/SCOPE: CPT

## 2022-09-13 PROCEDURE — 96375 TX/PRO/DX INJ NEW DRUG ADDON: CPT

## 2022-09-13 RX ORDER — MORPHINE SULFATE 50 MG/1
2 CAPSULE, EXTENDED RELEASE ORAL ONCE
Refills: 0 | Status: DISCONTINUED | OUTPATIENT
Start: 2022-09-13 | End: 2022-09-13

## 2022-09-13 RX ORDER — ACETAMINOPHEN 500 MG
650 TABLET ORAL ONCE
Refills: 0 | Status: COMPLETED | OUTPATIENT
Start: 2022-09-13 | End: 2022-09-13

## 2022-09-13 RX ORDER — CEFPODOXIME PROXETIL 100 MG
1 TABLET ORAL
Qty: 14 | Refills: 0
Start: 2022-09-13 | End: 2022-09-19

## 2022-09-13 RX ORDER — CEFTRIAXONE 500 MG/1
1000 INJECTION, POWDER, FOR SOLUTION INTRAMUSCULAR; INTRAVENOUS ONCE
Refills: 0 | Status: COMPLETED | OUTPATIENT
Start: 2022-09-13 | End: 2022-09-13

## 2022-09-13 RX ADMIN — CEFTRIAXONE 100 MILLIGRAM(S): 500 INJECTION, POWDER, FOR SOLUTION INTRAMUSCULAR; INTRAVENOUS at 14:10

## 2022-09-13 RX ADMIN — Medication 650 MILLIGRAM(S): at 09:15

## 2022-09-13 RX ADMIN — MORPHINE SULFATE 2 MILLIGRAM(S): 50 CAPSULE, EXTENDED RELEASE ORAL at 09:10

## 2022-09-13 RX ADMIN — Medication 650 MILLIGRAM(S): at 12:03

## 2022-09-13 RX ADMIN — MORPHINE SULFATE 2 MILLIGRAM(S): 50 CAPSULE, EXTENDED RELEASE ORAL at 12:03

## 2022-09-13 NOTE — ED ADULT NURSE NOTE - CAS EDN DISCHARGE ASSESSMENT
Patient currently is comfortable, on RA and looks well.  -ambulatory 02 checked and 100% on RA  -reports improvement in symptoms today
Alert and oriented to person, place and time

## 2022-09-13 NOTE — ED ADULT NURSE NOTE - NSIMPLEMENTINTERV_GEN_ALL_ED
Implemented All Fall with Harm Risk Interventions:  [place] to call system. Call bell, personal items and telephone within reach. Instruct patient to call for assistance. Room bathroom lighting operational. Non-slip footwear when patient is off stretcher. Physically safe environment: no spills, clutter or unnecessary equipment. Stretcher in lowest position, wheels locked, appropriate side rails in place. Provide visual cue, wrist band, yellow gown, etc. Monitor gait and stability. Monitor for mental status changes and reorient to person, place, and time. Review medications for side effects contributing to fall risk. Reinforce activity limits and safety measures with patient and family. Provide visual clues: red socks.

## 2022-09-13 NOTE — ED PROVIDER NOTE - NSFOLLOWUPINSTRUCTIONS_ED_ALL_ED_FT
Urinary Retention    Urinary retention is the inability to completely empty your bladder. This is a common problem in older men, especially with enlarged prostates. If you are sent home with a fish catheter and a drainage system make sure to keep the drainage bag emptied and lower than your catheter. Keep the fish catheter in until you follow up with a urologist.    SEEK IMMEDIATE MEDICAL CARE IF YOU DEVELOP THE FOLLOWING SYMPTOMS: the catheter stops draining urine, the catheter falls out, abdominal pain, nausea/vomiting, or chills/fever.  =========================================================    Urinary Tract Infection    A urinary tract infection (UTI) is an infection of any part of the urinary tract, which includes the kidneys, ureters, bladder, and urethra. Risk factors include ignoring your need to urinate, wiping back to front if female, being an uncircumcised male, and having diabetes or a weak immune system. Symptoms include frequent urination, pain or burning with urination, foul smelling urine, cloudy urine, pain in the lower abdomen, blood in the urine, and fever. If you were prescribed an antibiotic medicine, take it as told by your health care provider. Do not stop taking the antibiotic even if you start to feel better.    SEEK IMMEDIATE MEDICAL CARE IF YOU HAVE ANY OF THE FOLLOWING SYMPTOMS: severe back or abdominal pain, fever, inability to keep fluids or medicine down, dizziness/lightheadedness, or a change in mental status.    ==================================================    Call Dr. Umanzor, to arrange follow up appointment.    Lucio Umanzor)  Urology  [address] [place] [address]  Phone: [phone]  Fax: [phone]  Follow Up Time: 4-6 Days

## 2022-09-13 NOTE — ED ADULT NURSE REASSESSMENT NOTE - NS ED NURSE REASSESS COMMENT FT1
Pt refused fish insertion at this time, MD Hamilton at bedside is aware
Pt urinated x 2, residual urine volume via bladder scan less than 200ml, MD Knowles made aware with orders to hold fish catheter insertion at this time
pt resting inn bed, +admits to voiding large amount of urine, family member at bedside, dispo pending

## 2022-09-13 NOTE — ED PROVIDER NOTE - OBJECTIVE STATEMENT
52y M with PMH TIA on Eliquis, HLD, and recent cystoscopy 9/6 presenting with 1w worsening suprapubic pain and urinary hesitancy. Pt reports he had cystoscopy 9/6 for blood in the urine, and cystoscopy was normal. 2d after procedure, pt developed sharp non-radiating pain that has progressed over time, 10/10 at worst. Pt was given cefuroxime 9/8, switched to ciprofloxacin yesterday after no relief of pain. Pt has taken phenazopyridine with no relief. Yesterday, pt began experiencing burning sensation with pain. Unable to sleep 2/2 pain. Difficulty starting stream, and has had episodes of urinary incontinence. Denies back pain, saddle anesthesia, fecal incontinence, fevers, or chills.     urologist is Dr. Liu

## 2022-09-13 NOTE — ED PROVIDER NOTE - CARE PLAN
1 Principal Discharge DX:	BPH with urinary obstruction  Secondary Diagnosis:	Urinary tract infection

## 2022-09-13 NOTE — CONSULT NOTE ADULT - ASSESSMENT
Hematology/ Oncology called to see patient who is under the care of Dr. Fito Varela of [facility] d/t his history of transient ischemic attack (TIA), and cerebral infarction.     CVA  --patient with history of radiographic CVA in February 2022 in [facility]  --lupus anticoagulant antiphospholipid labs are negative. Prothrombin gene and 5 Leiden has also been negative. Protein C protein S and antithrombin 3 activity is normal  --has a PFO which is small, undergoing  cardiology workup for closure  --on Kiko Xiong NP  Hematology/ Oncology  [facility]  [phone] (office)  [phone] (alt office)  Evenings and weekends please call MD on call or office

## 2022-09-13 NOTE — ED PROVIDER NOTE - PHYSICAL EXAMINATION
LOS:     VITALS:   T(C): 36.6 (09-13-22 @ 08:01), Max: 36.6 (09-13-22 @ 08:01)  HR: 77 (09-13-22 @ 08:01) (77 - 77)  BP: 136/97 (09-13-22 @ 08:01) (136/97 - 136/97)  RR: 20 (09-13-22 @ 08:01) (20 - 20)  SpO2: 97% (09-13-22 @ 08:01) (97% - 97%)    GENERAL: sitting in bed, bent over. appears to be in pain  HEAD:  Atraumatic, Normocephalic  EYES: EOMI, conjunctiva and sclera clear  ENT: Moist mucous membranes  CHEST/LUNG: Clear to auscultation bilaterally; No rales, rhonchi, wheezing, or rubs. Unlabored respirations  HEART: Regular rate and rhythm;   BACK: No CVA tenderness  ABDOMEN: Soft, nontender, nondistended. nontender to deep palpation   EXTREMITIES:  2+ Peripheral Pulses, brisk capillary refill. No clubbing, cyanosis, or edema  NERVOUS SYSTEM:  A&Ox3, no focal deficits   SKIN: No rashes or lesions

## 2022-09-13 NOTE — ED PROVIDER NOTE - CLINICAL SUMMARY MEDICAL DECISION MAKING FREE TEXT BOX
52y M with PMH TIA on Eliquis, HLD, and recent cystoscopy 9/6 presenting with 1w worsening suprapubic pain and urinary hesitancy. Pt reports he had cystoscopy 9/6 for blood in the urine, and cystoscopy was normal. 2d after procedure, pt developed sharp non-radiating pain that has progressed over time, 10/10 at worst. Pt was given cefuroxime 9/8, switched to ciprofloxacin yesterday after no relief of pain. Pt has taken phenazopyridine with no relief. Yesterday, pt began experiencing burning sensation with pain. Unable to sleep 2/2 pain. Difficulty starting stream, and has had episodes of urinary incontinence. Denies back pain, saddle anesthesia, fecal incontinence, fevers, or chills. 52y M with PMH TIA on Eliquis, HLD, and recent cystoscopy 9/6 presenting with 1w worsening suprapubic pain and urinary hesitancy. Pt reports he had cystoscopy 9/6 for blood in the urine, and cystoscopy was normal. 2d after procedure, pt developed sharp non-radiating pain that has progressed over time, 10/10 at worst. Pt was given cefuroxime 9/8, switched to ciprofloxacin yesterday after no relief of pain. Pt has taken phenazopyridine with no relief. Yesterday, pt began experiencing burning sensation with pain. Unable to sleep 2/2 pain. Difficulty starting stream, and has had episodes of urinary incontinence. Denies back pain, saddle anesthesia, fecal incontinence, fevers, or chills.     injury vs UTI    Plan:  bladder POCUS  UA, UCx  pain control with morphine and tylenol  fish

## 2022-09-13 NOTE — ED PROVIDER NOTE - NS ED ATTENDING STATEMENT MOD
This was a shared visit with the ARABELLA. I reviewed and verified the documentation and independently performed the documented:

## 2022-09-13 NOTE — ED PROVIDER NOTE - CARE PROVIDER_API CALL
Lucio Umanzor)  Urology  [address] [place] [address]  Phone: [phone]  Fax: [phone]  Follow Up Time: 4-6 Days

## 2022-09-13 NOTE — ED ADULT NURSE NOTE - OBJECTIVE STATEMENT
52y/M c/o worsening 'bladder spasms' and urinary problems x 1 week, hx of cystoscopy x 1 week ago. Pt endorsed blood in urine x 2 weeks ago.   Pt currently on eliquis s/p CVA.

## 2022-09-13 NOTE — ED PROVIDER NOTE - PROGRESS NOTE DETAILS
post-residual volume on bladder US shows 600mL. will start fish repeat post-void residual shows 170cc.  patient adamantly wants to avoid fish catheter at this time.   still awaiting cbc, bmp, ua results. US suggests UTI, given that he has already obstructed once, he is at high risk for going back into retention with a UTI, which carries high risk for uroespsis without a fish catheter in place.    Plan:  fish cath, abx, f/u primary Urologist.

## 2022-09-13 NOTE — ED PROVIDER NOTE - PATIENT PORTAL LINK FT
You can access the FollowMyHealth Patient Portal offered by [facility] by registering at the following website: [email or web address]. By joining [employer]s FollowMyHealth portal, you will also be able to view your health information using other applications (apps) compatible with our system.

## 2022-09-13 NOTE — ED PROVIDER NOTE - ATTENDING APP SHARED VISIT CONTRIBUTION OF CARE
MD Knowles:  patient seen and evaluated with the resident.  I was present for key portions of the History & Physical, and I agree with the Impression & Plan.  MD Knowles: 53 yo  M, c/o difficulty urinating x 1 wk.    Quality: decreased output.  Duration:  intermittently 1 wk, ever since cystoscopy by Urologist, which he underwent for evaluation of hematuria.    Associated Sx: no f/c, no n/v/d, +suprapubic distension.  Better: none.  Worse: time.   VS:  tachy, otherwise wnl.  Physical Exam: adult M, uncomfortable appearing, NCAT, PERRL, EOMI, neck supple, RRR, CTA B, Abd: +suprapubic distension with associated SP ttp, Ext: no edema, Neuro: AAOx3, ambulates w/o diff, strength 5/5 & symmetric throughout.  Rectal:  nontender.  Impression:  urinary obstruction, either 2/2 BPH and/or UTI.  2nd post-void residual showed volume < 200.  Onset > 4 d ago warrants cbc, bmp.  Plan:  awaiting lab results.

## 2022-09-13 NOTE — CONSULT NOTE ADULT - SUBJECTIVE AND OBJECTIVE BOX
Reason for consult:    HPI:    Hematology/ Oncology called to see patient who is under the care of Dr. Fito Varela of [facility] d/t his history of transient ischemic attack (TIA), and cerebral infarction.    PAST MEDICAL & SURGICAL HISTORY:  History of TIAs      CVA (cerebrovascular accident)      HLD (hyperlipidemia)      No significant past surgical history          FAMILY HISTORY:      Alochol: Denied  Smoking: Nonsmoker  Drug Use: Denied  Marital Status:         Allergies    No Known Allergies    Intolerances        MEDICATIONS  (STANDING):    MEDICATIONS  (PRN):      ROS  No fever, sweats, chills  No epistaxis, HA, sore throat  No CP, SOB, cough, sputum  No n/v/d, abd pain, melena, hematochezia  No edema  No rash  No anxiety  No back pain, joint pain  No bleeding, bruising  No dysuria, hematuria    T(C): 36.6 (09-13-22 @ 08:01), Max: 36.6 (09-13-22 @ 08:01)  HR: 77 (09-13-22 @ 08:01) (77 - 77)  BP: 136/97 (09-13-22 @ 08:01) (136/97 - 136/97)  RR: 20 (09-13-22 @ 08:01) (20 - 20)  SpO2: 97% (09-13-22 @ 08:01) (97% - 97%)  Wt(kg): --    PE  NAD  Awake, alert  Anicteric, MMM  RRR  CTAB  Abd soft, NT, ND  No c/c/e  No rash grossly  FROM

## 2022-09-14 PROBLEM — R94.39 ABNORMAL STRESS TEST: Status: ACTIVE | Noted: 2022-09-14

## 2022-09-14 LAB
CULTURE RESULTS: SIGNIFICANT CHANGE UP
SPECIMEN SOURCE: SIGNIFICANT CHANGE UP

## 2022-09-14 NOTE — REASON FOR VISIT
[Other: ____] : [unfilled] [FreeTextEntry1] : History of Present Illness\par 53yo L-handed man with a PMHx significant for CVA and TIAs (02/2022 s/p tPA, residual expressive aphasia), HLD, and a septal defect (on Eliquis) presents to the ED on August 3, 2022 with difficulty using his L hand, nausea, vomiting, headache, and worsening expressive aphasia.  CTA head/neck showed no LVO. CTP showed a core of 0ml, and a R frontal penumbra of 7ml and parieto-occipital of 4ml. tPA was administered at 1051h. Patient was not a candidate for thrombectomy due to no LVO. Pt now back to baseline.  Of note, pt stated that he did not consistently take his eliquis prior to arrival at the hospital. Reported missing multiple evening doses over the past few days-weeks. 24h post-TPA CTH was obtained around 22:45, appeared stable and pt was resumed on home ASA and Eliquis.  MRI at [facility] demonstrated an abnormal.  He was started on Eliquis at that time.  Dr. Wallace placed his loop recorder.  \par \par The patients first TIA/stroke was 10 years ago (2011 and 2013).  MIMI on 2/9/2022 demonstrated a shunt without specified ASD/PFO visualized.  The right to left shunting is viewable with saline contrast.  The shunt was late and small in severity.  Dr. Wallace told him for last 6 months no evidence of atrial fibrillation or flutter. No palpitations.  No chest pain/tightness/discomfort.  Suffers from chronic extreme fatigue for which he was seen by a neurologist.  He was started on Adderral which has made a big difference.  He is being Dr. Lucia for workup of hypercoaguable conditions.\par \par Cardiologist Jose Oneil\par Address [address]\par Phone [phone]\par \par Past medical history\par CVA\par History of TIAs\par Hyperlipidemia\par ASD\par \par Past surgical history \par Noncontributory\par \par Family history\par Contributory for premature coronary disease or sudden cardiac death\par \par Social history\par No known drug allergies\par \par Physical examination \par No apparent distress, alert and oriented x3, appropriate affect \par JVD not elevated, supple, no lymphadenopathy\par Clear to auscultation bilaterally no wheezing rhonchi or crackles\par Regular rate and rhythm with no murmur rub or gallop \par Positive bowel sound, soft, nontender, nondistended, no masses guarding or rebound tenderness\par No clubbing cyanosis or edema \par Moving all extremities spontaneously\par 2+ DP/PT pulses \par No focal deficits\par \par Assessment/plan\par --Patient undergoing hypercoag workup.  Asked patient to please have hematologist send me note once completed.\par --Being assessed for underlying arrhythmias.  Denies any history of palpitations.  Recommend that patients holter monitor results/assessment by EP doctor be sent to me for review.  \par --History of multiple CVA's and TIA's with known interatrial septal defect/?PFO. Concern for embolic etiology, however, per pt follows private cardiologist, deemed ASD/PFO too small. On ASA and Eliquis at home.  S/P TPA ~23:00 8/3.  Repeat CTH was performed 11:11 8/4 for HA (stable from presentation/prior to TPA) - stable.  Rrepeat CTH 24hrs post TPA - stable.  MRI on 8/5/2022–no current evidence for acute infarct.  Mild atrophy with mild chronic white matter ischemic changes.\par --Explained to patient and wife that based upon above studies the patient has what appears to be a PFO or multi-fenestrated ASD.  If above workup is unremarkable (hypercoagulable, arrhythmia) it is recommended that he undergo percutaneous closure.  Indications and details for percutaneous closure was explained.  Benefits and risks were reviewed.  Risks include but are not limited to infection, bleeding, arrhythmia, TIA/stroke, vascular injury, need for urgent surgery, device infection/embolization/perforation and death was gone over.\par --Informed that the devices are 1.5T and 3T compatible.\par --He will need to get antibiotic prophylaxis prior to any procedure for the first 6 months following device closure.\par --Continue Eliquis 5 mg twice a day.  Signs and symptoms of bleeding were reviewed.\par --Continue aspirin 81 mg daily.\par --TTE on 8/4/2022 demonstrated normal left ventricular internal dimensions and wall thickness.  Normal left ventricular systolic function.  No segmental wall motion abnormalities.  Normal right ventricular size and function.  Agitated saline injection demonstrates no evidence of a possible patent foramen ovale.\par --Venous duplex study on 8/9 July 2022 demonstrate no evidence of left lower extremity DVT.  \par --Continue statin.\par --EKG performed do to history of interatrial septal defect/PFO.\par \par Patient is being followed by neurology/Dr. Castro\par \par All questions and concerns of the patient and his wife were addressed.

## 2022-09-15 ENCOUNTER — EMERGENCY (EMERGENCY)
Facility: HOSPITAL | Age: 52
LOS: 1 days | Discharge: HOME | End: 2022-09-15
Attending: STUDENT IN AN ORGANIZED HEALTH CARE EDUCATION/TRAINING PROGRAM
Payer: COMMERCIAL

## 2022-09-15 VITALS
SYSTOLIC BLOOD PRESSURE: 145 MMHG | HEART RATE: 90 BPM | WEIGHT: 210.1 LBS | HEIGHT: 75 IN | TEMPERATURE: 98 F | RESPIRATION RATE: 19 BRPM | DIASTOLIC BLOOD PRESSURE: 86 MMHG | OXYGEN SATURATION: 95 %

## 2022-09-15 VITALS
HEART RATE: 75 BPM | OXYGEN SATURATION: 98 % | DIASTOLIC BLOOD PRESSURE: 95 MMHG | TEMPERATURE: 98 F | SYSTOLIC BLOOD PRESSURE: 126 MMHG | RESPIRATION RATE: 16 BRPM

## 2022-09-15 PROCEDURE — 99282 EMERGENCY DEPT VISIT SF MDM: CPT

## 2022-09-15 PROCEDURE — 99283 EMERGENCY DEPT VISIT LOW MDM: CPT

## 2022-09-15 NOTE — ED PROVIDER NOTE - PATIENT PORTAL LINK FT
You can access the FollowMyHealth Patient Portal offered by [facility] by registering at the following website: [email or web address]. By joining [employer]’s FollowMyHealth portal, you will also be able to view your health information using other applications (apps) compatible with our system.

## 2022-09-15 NOTE — ED ADULT NURSE NOTE - OBJECTIVE STATEMENT
Pt is a 52 yr old male coming from home for fish complications. Pt states he had a Fish placed here on Monday after an ongoing UTI that caused retention- pt was sent home with Cefpodoxime and Flomax and told to follow up with urology. Pt's appointment for urology is tomorrow at 2:30 pm. Pt states today he has the feeling of frequency with his urination- and although the fish is still draining- he feels something is "stuck" in his bladder. Pt is a/ox 3- denies bloody urine at this time- stable vitals. Pt has been taking antispasmodics as well.

## 2022-09-15 NOTE — ED PROVIDER NOTE - ATTENDING CONTRIBUTION TO CARE
Attending (Azeem Trujillo M.D.):  I have personally seen and examined this patient. I have performed a substantive portion of the visit including all aspects of the medical decision making. Resident and/or ACP note reviewed. I agree on the plan of care except where noted.

## 2022-09-15 NOTE — ED ADULT TRIAGE NOTE - CHIEF COMPLAINT QUOTE
Pt c/o "dx with bladder infection with fish placed here on Monday. No having some pain with the fish"

## 2022-09-15 NOTE — ED PROVIDER NOTE - NSFOLLOWUPINSTRUCTIONS_ED_ALL_ED_FT
1. Please follow up with your urologist as scheduled tomorrow.    2. Please return to the ER if you develop fever, inability to urinate, abdominal pain or anything of concern.

## 2022-09-15 NOTE — ED PROVIDER NOTE - OBJECTIVE STATEMENT
52yM with PMH of CVA presents to the ED c/o of worsening penile and suprapubic pain for the past two days. Pt had fish placed in the ED on 9/13 for retention 2/2 to hematuria and UTI. Pt had recent cystoscopy for hematuria which was normal. Pt has appointment with urology tomorrow. Denies fever, n/v. Pt did not notice hematuria or cloudy urine in the bag.

## 2022-09-15 NOTE — ED PROVIDER NOTE - CLINICAL SUMMARY MEDICAL DECISION MAKING FREE TEXT BOX
Te Monson, DO PGY-2: 52yM with PMH of CVA presents to the ED c/o of worsening penile and suprapubic pain for the past two days. Pt had fish placed in the ED on 9/13 for retention 2/2 to hematuria and UTI. Pt had recent cystoscopy for hematuria which was normal. Pt has appointment with urology tomorrow. Denies fever, n/v. Will remove fish and trial to void. Pt is not in rentention currently.

## 2022-09-15 NOTE — ED PROVIDER NOTE - NS ED ROS FT
GENERAL: No fever, chills  CARDIAC: no chest pain/pressure, SOB, lower extremity swelling  PULMONARY: no cough, SOB  GI: +abdominal pain, no n/v/d  : + dysuria, no hematuria  SKIN: no rashes, no ecchymosis  NEURO: no headache, lightheadedness  MSK: No joint pain, myalgia, weakness.

## 2022-09-15 NOTE — ED PROVIDER NOTE - PROGRESS NOTE DETAILS
Te Monson, DO PGY-2: POCUS showing fish ballon within the bladder, fish will not advance or draw back. fish does flush. Due to discomfort and fish inability to advance/draw back will remove and trial to void. Pt feels relief after fish removal. Te Monson, DO PGY-2: Pt voided and PVR of 5cc. Will dc with uro f/u tomorrow

## 2022-09-15 NOTE — ED ADULT TRIAGE NOTE - GLASGOW COMA SCALE: EYE OPENING, MLM
07/04/19 0810   Pulmonary Toilet   Pulmonary Toilet Incentive Spirometry   Incentive Spirometry Treatment   Level of Service Independent   Predicted Volume (ml) 1500 ml   Actual Volume (ml) 1200 ml   % Predicted Volume (ml) 0.8 (E4) spontaneous

## 2022-09-15 NOTE — ED PROVIDER NOTE - PHYSICAL EXAMINATION
GEN: Patient awake alert NAD.   HEENT: normocephalic, atraumatic, moist MM  CARDIAC: RRR, S1, S2, no murmur.   PULM: CTA B/L no wheeze, rhonchi, rales.   ABD: soft NT, ND, no rebound no guarding,   MSK: Moving all extremities, no edema.    NEURO: A&Ox3,  no focal neurological deficits,   SKIN: warm, dry, no rash.  : normal circumcised penis without discharge or erythema at the meatus or penile head     exam chaperone MARK Moise

## 2022-10-04 ENCOUNTER — APPOINTMENT (OUTPATIENT)
Dept: NEUROLOGY | Facility: CLINIC | Age: 52
End: 2022-10-04

## 2022-10-04 VITALS — DIASTOLIC BLOOD PRESSURE: 85 MMHG | HEART RATE: 87 BPM | SYSTOLIC BLOOD PRESSURE: 120 MMHG

## 2022-10-04 DIAGNOSIS — I78.1 NEVUS, NON-NEOPLASTIC: ICD-10-CM

## 2022-10-04 PROCEDURE — 99205 OFFICE O/P NEW HI 60 MIN: CPT

## 2022-10-04 NOTE — DISCUSSION/SUMMARY
[FreeTextEntry1] : Mr. 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 We discussed there is no treatment for this as there is no risk associated and is not the cause of his previous strokes. He will continue to follow with Dr. Lynn for PFO closure and Dr. Forde for his stroke care. All of their questions and concerns were addressed.

## 2022-10-04 NOTE — CONSULT LETTER
[Dear  ___] : Dear  [unfilled], [Consult Letter:] : I had the pleasure of evaluating your patient, [unfilled]. [( Thank you for referring [unfilled] for consultation for _____ )] : Thank you for referring [unfilled] for consultation for [unfilled] [Please see my note below.] : Please see my note below. [Consult Closing:] : Thank you very much for allowing me to participate in the care of this patient.  If you have any questions, please do not hesitate to contact me. [Sincerely,] : Sincerely, [FreeTextEntry3] : Fred Nation MD\par Chief, Vascular Neurology and Neurology Service , NeuroEndovascular Surgery\par  of Neurology and Radiology\par [facility]\par Director, Comprehensive Stroke Center and Stroke Unit\par [facility]\par Director, NeuroEndovascular Surgery\par [facility]\par

## 2022-10-04 NOTE — REVIEW OF SYSTEMS
[Fever] : no fever [Chills] : no chills [Feeling Poorly] : not feeling poorly [Feeling Tired] : not feeling tired [Suicidal] : not suicidal [Anxiety] : no anxiety [Depression] : no depression [Confused or Disoriented] : no confusion [Memory Lapses or Loss] : no memory loss [Decr. Concentrating Ability] : no decrease in concentrating ability [Difficulty with Language] : no ~M difficulty with language [Changed Thought Patterns] : no change in thought patterns [Repeating Questions] : no repeated questioning about recent events [Facial Weakness] : no facial weakness [Arm Weakness] : no arm weakness [Hand Weakness] : no hand weakness [Leg Weakness] : no leg weakness [Poor Coordination] : good coordination [Difficulties in Speech] : no speech difficulties [Numbness] : no numbness [Tingling] : no tingling [Seizures] : no convulsions [Dizziness] : no dizziness [Fainting] : no fainting [Lightheadedness] : no lightheadedness [Vertigo] : no vertigo [Tension Headache] : no tension-type headache [Difficulty Walking] : no difficulty walking [Eyesight Problems] : no eyesight problems [Loss Of Hearing] : no hearing loss [Chest Pain] : no chest pain [Palpitations] : no palpitations [Shortness Of Breath] : no shortness of breath [Vomiting] : no vomiting [Incontinence] : no incontinence [Joint Pain] : no joint pain [Easy Bleeding] : no tendency for easy bleeding [Easy Bruising] : no tendency for easy bruising

## 2022-10-04 NOTE — PHYSICAL EXAM
[General Appearance - Alert] : alert [General Appearance - Well Nourished] : well nourished [General Appearance - Well Developed] : well developed [Oriented To Time, Place, And Person] : oriented to person, place, and time [Affect] : the affect was normal [Mood] : the mood was normal [Person] : oriented to person [Place] : oriented to place [Time] : oriented to time [Concentration Intact] : normal concentrating ability [Visual Intact] : visual attention was ~T not ~L decreased [Naming Objects] : no difficulty naming common objects [Repeating Phrases] : no difficulty repeating a phrase [Writing A Sentence] : no difficulty writing a sentence [Fluency] : fluency intact [Comprehension] : comprehension intact [Reading] : reading intact [Past History] : adequate knowledge of personal past history [Cranial Nerves Optic (II)] : visual acuity intact bilaterally,  visual fields full to confrontation, pupils equal round and reactive to light [Cranial Nerves Oculomotor (III)] : extraocular motion intact [Cranial Nerves Trigeminal (V)] : facial sensation intact symmetrically [Cranial Nerves Facial (VII)] : face symmetrical [Cranial Nerves Vestibulocochlear (VIII)] : hearing was intact bilaterally [Cranial Nerves Glossopharyngeal (IX)] : tongue and palate midline [Cranial Nerves Accessory (XI - Cranial And Spinal)] : head turning and shoulder shrug symmetric [Cranial Nerves Hypoglossal (XII)] : there was no tongue deviation with protrusion [Motor Tone] : muscle tone was normal in all four extremities [Motor Strength] : muscle strength was normal in all four extremities [No Muscle Atrophy] : normal bulk in all four extremities [Motor Handedness Right-Handed] : the patient is right hand dominant [Sensation Tactile Decrease] : light touch was intact [Balance] : balance was intact [Full Visual Field] : full visual field [Hearing Threshold Finger Rub Not Hale] : hearing was normal [Neck Appearance] : the appearance of the neck was normal [] : no respiratory distress [Heart Rate And Rhythm] : heart rate was normal and rhythm regular [Edema] : there was no peripheral edema [Abdomen Soft] : soft [Abnormal Walk] : normal gait [Paresis Pronator Drift Right-Sided] : no pronator drift on the right [Paresis Pronator Drift Left-Sided] : no pronator drift on the left [Motor Strength Upper Extremities Bilaterally] : strength was normal in both upper extremities [Motor Strength Lower Extremities Bilaterally] : strength was normal in both lower extremities

## 2022-10-04 NOTE — HISTORY OF PRESENT ILLNESS
[FreeTextEntry1] : Mr. Simeon is a 52 year old man with a PMHx of stroke in 02/06/2022 s/p tPA @[facility] and TIA\par (08/04/2022 s/p tPA @ [facility]), MRI Head negative in August 2022 was negative for acute infarct, but showed capillary telangiectasia within the alexis. All neuroimaging from [facility] and [facility] reviewed personally by me. He is on Eliquis and ASA for atrial septal defect and following with Dr. Lynn for possible PFO closure. Referred by Dr. Forde to discuss the telangiectasia. He has an ILR in place being followed by cardiology. He denies any interval TIA/ Stroke symptoms.

## 2022-10-13 ENCOUNTER — APPOINTMENT (OUTPATIENT)
Dept: CARDIOLOGY | Facility: CLINIC | Age: 52
End: 2022-10-13

## 2022-10-13 NOTE — REASON FOR VISIT
[Other: ____] : [unfilled] [FreeTextEntry1] : History of Present Illness\par 51yo L-handed man with a PMHx significant for CVA and TIAs (02/2022 s/p tPA, residual expressive aphasia), HLD, and a septal defect (on Eliquis) presents to the ED on August 3, 2022 with difficulty using his L hand, nausea, vomiting, headache, and worsening expressive aphasia.  CTA head/neck showed no LVO. CTP showed a core of 0ml, and a R frontal penumbra of 7ml and parieto-occipital of 4ml. tPA was administered at 1051h. Patient was not a candidate for thrombectomy due to no LVO. Pt now back to baseline.  Of note, pt stated that he did not consistently take his eliquis prior to arrival at the hospital. Reported missing multiple evening doses over the past few days-weeks. 24h post-TPA CTH was obtained around 22:45, appeared stable and pt was resumed on home ASA and Eliquis.  [facility] at [facility] demonstrated an abnormal.  He was started on Eliquis at that time.  Dr. Wallace placed his loop recorder.  \par \par The patients first TIA/stroke was 10 years ago (2011 and 2013).  MIMI on 2/9/2022 demonstrated a shunt without specified ASD/PFO visualized.  The right to left shunting is viewable with saline contrast.  The shunt was late and small in severity.  Dr. Wallace told him for last 6 months no evidence of atrial fibrillation or flutter. No palpitations.  No chest pain/tightness/discomfort.  Suffers from chronic extreme fatigue for which he was seen by a neurologist.  He was started on Adderral which has made a big difference.  He is being Dr. Lucia for workup of hypercoaguable conditions.\par \par Cardiologist Jose Oneil\par Address [address]\par Phone [phone]\par \par Past medical history\par CVA\par History of TIAs\par Hyperlipidemia\par ASD\par \par Past surgical history \par Noncontributory\par \par Family history\par Contributory for premature coronary disease or sudden cardiac death\par \par Social history\par No known drug allergies\par \par Physical examination \par No apparent distress, alert and oriented x3, appropriate affect \par JVD not elevated, supple, no lymphadenopathy\par Clear to auscultation bilaterally no wheezing rhonchi or crackles\par Regular rate and rhythm with no murmur rub or gallop \par Positive bowel sound, soft, nontender, nondistended, no masses guarding or rebound tenderness\par No clubbing cyanosis or edema \par Moving all extremities spontaneously\par 2+ DP/PT pulses \par No focal deficits\par \par Assessment/plan\par --Patient undergoing hypercoag workup.  Asked patient to please have hematologist send me note once completed.\par --Being assessed for underlying arrhythmias.  Denies any history of palpitations.  Recommend that patients holter monitor results/assessment by EP doctor be sent to me for review.  \par --History of multiple CVA's and TIA's with known interatrial septal defect/?PFO. Concern for embolic etiology, however, per pt follows private cardiologist, deemed ASD/PFO too small. On ASA and Eliquis at home.  S/P TPA ~23:00 8/3.  Repeat CTH was performed 11:11 8/4 for HA (stable from presentation/prior to TPA) - stable.  Rrepeat CTH 24hrs post TPA - stable.  MRI on 8/5/2022–no current evidence for acute infarct.  Mild atrophy with mild chronic white matter ischemic changes.\par --Explained to patient and wife that based upon above studies the patient has what appears to be a PFO or multi-fenestrated ASD.  If above workup is unremarkable (hypercoagulable, arrhythmia) it is recommended that he undergo percutaneous closure.  Indications and details for percutaneous closure was explained.  Benefits and risks were reviewed.  Risks include but are not limited to infection, bleeding, arrhythmia, TIA/stroke, vascular injury, need for urgent surgery, device infection/embolization/perforation and death was gone over.\par --Informed that the devices are 1.5T and 3T compatible.\par --He will need to get antibiotic prophylaxis prior to any procedure for the first 6 months following device closure.\par --Continue Eliquis 5 mg twice a day.  Signs and symptoms of bleeding were reviewed.\par --Continue aspirin 81 mg daily.\par --TTE on 8/4/2022 demonstrated normal left ventricular internal dimensions and wall thickness.  Normal left ventricular systolic function.  No segmental wall motion abnormalities.  Normal right ventricular size and function.  Agitated saline injection demonstrates no evidence of a possible patent foramen ovale.\par --Venous duplex study on 8/9 July 2022 demonstrate no evidence of left lower extremity DVT.  \par --Continue statin.\par --EKG performed do to history of interatrial septal defect/PFO.\par \par Patient is being followed by neurology/Dr. Castro\par \par All questions and concerns of the patient and his wife were addressed.

## 2022-11-04 ENCOUNTER — INPATIENT (INPATIENT)
Facility: HOSPITAL | Age: 52
LOS: 2 days | Discharge: HOME | DRG: 65 | End: 2022-11-07
Attending: PSYCHIATRY & NEUROLOGY | Admitting: PSYCHIATRY & NEUROLOGY
Payer: COMMERCIAL

## 2022-11-04 VITALS
HEART RATE: 77 BPM | TEMPERATURE: 98 F | DIASTOLIC BLOOD PRESSURE: 86 MMHG | OXYGEN SATURATION: 99 % | WEIGHT: 210.1 LBS | SYSTOLIC BLOOD PRESSURE: 134 MMHG | HEIGHT: 75 IN | RESPIRATION RATE: 20 BRPM

## 2022-11-04 LAB
ALBUMIN SERPL ELPH-MCNC: 4.2 G/DL — SIGNIFICANT CHANGE UP (ref 3.3–5)
ALP SERPL-CCNC: 74 U/L — SIGNIFICANT CHANGE UP (ref 40–120)
ALT FLD-CCNC: 28 U/L — SIGNIFICANT CHANGE UP (ref 10–45)
ANION GAP SERPL CALC-SCNC: 11 MMOL/L — SIGNIFICANT CHANGE UP (ref 5–17)
AST SERPL-CCNC: 21 U/L — SIGNIFICANT CHANGE UP (ref 10–40)
BASOPHILS # BLD AUTO: 0.06 K/UL — SIGNIFICANT CHANGE UP (ref 0–0.2)
BASOPHILS NFR BLD AUTO: 0.9 % — SIGNIFICANT CHANGE UP (ref 0–2)
BILIRUB SERPL-MCNC: 0.6 MG/DL — SIGNIFICANT CHANGE UP (ref 0.2–1.2)
BUN SERPL-MCNC: 10 MG/DL — SIGNIFICANT CHANGE UP (ref 7–23)
CALCIUM SERPL-MCNC: 8.9 MG/DL — SIGNIFICANT CHANGE UP (ref 8.4–10.5)
CHLORIDE SERPL-SCNC: 105 MMOL/L — SIGNIFICANT CHANGE UP (ref 96–108)
CO2 SERPL-SCNC: 26 MMOL/L — SIGNIFICANT CHANGE UP (ref 22–31)
CREAT SERPL-MCNC: 0.87 MG/DL — SIGNIFICANT CHANGE UP (ref 0.5–1.3)
EGFR: 104 ML/MIN/1.73M2 — SIGNIFICANT CHANGE UP
EOSINOPHIL # BLD AUTO: 0.25 K/UL — SIGNIFICANT CHANGE UP (ref 0–0.5)
EOSINOPHIL NFR BLD AUTO: 3.6 % — SIGNIFICANT CHANGE UP (ref 0–6)
GLUCOSE SERPL-MCNC: 98 MG/DL — SIGNIFICANT CHANGE UP (ref 70–99)
HCT VFR BLD CALC: 40.9 % — SIGNIFICANT CHANGE UP (ref 39–50)
HGB BLD-MCNC: 14 G/DL — SIGNIFICANT CHANGE UP (ref 13–17)
IMM GRANULOCYTES NFR BLD AUTO: 0.3 % — SIGNIFICANT CHANGE UP (ref 0–0.9)
LYMPHOCYTES # BLD AUTO: 1.66 K/UL — SIGNIFICANT CHANGE UP (ref 1–3.3)
LYMPHOCYTES # BLD AUTO: 24 % — SIGNIFICANT CHANGE UP (ref 13–44)
MCHC RBC-ENTMCNC: 29.2 PG — SIGNIFICANT CHANGE UP (ref 27–34)
MCHC RBC-ENTMCNC: 34.2 GM/DL — SIGNIFICANT CHANGE UP (ref 32–36)
MCV RBC AUTO: 85.4 FL — SIGNIFICANT CHANGE UP (ref 80–100)
MONOCYTES # BLD AUTO: 0.64 K/UL — SIGNIFICANT CHANGE UP (ref 0–0.9)
MONOCYTES NFR BLD AUTO: 9.3 % — SIGNIFICANT CHANGE UP (ref 2–14)
NEUTROPHILS # BLD AUTO: 4.28 K/UL — SIGNIFICANT CHANGE UP (ref 1.8–7.4)
NEUTROPHILS NFR BLD AUTO: 61.9 % — SIGNIFICANT CHANGE UP (ref 43–77)
NRBC # BLD: 0 /100 WBCS — SIGNIFICANT CHANGE UP (ref 0–0)
PLATELET # BLD AUTO: 183 K/UL — SIGNIFICANT CHANGE UP (ref 150–400)
POTASSIUM SERPL-MCNC: 3.4 MMOL/L — LOW (ref 3.5–5.3)
POTASSIUM SERPL-SCNC: 3.4 MMOL/L — LOW (ref 3.5–5.3)
PROT SERPL-MCNC: 7.3 G/DL — SIGNIFICANT CHANGE UP (ref 6–8.3)
RBC # BLD: 4.79 M/UL — SIGNIFICANT CHANGE UP (ref 4.2–5.8)
RBC # FLD: 13 % — SIGNIFICANT CHANGE UP (ref 10.3–14.5)
SODIUM SERPL-SCNC: 142 MMOL/L — SIGNIFICANT CHANGE UP (ref 135–145)
WBC # BLD: 6.91 K/UL — SIGNIFICANT CHANGE UP (ref 3.8–10.5)
WBC # FLD AUTO: 6.91 K/UL — SIGNIFICANT CHANGE UP (ref 3.8–10.5)

## 2022-11-04 PROCEDURE — 93010 ELECTROCARDIOGRAM REPORT: CPT

## 2022-11-04 PROCEDURE — 70498 CT ANGIOGRAPHY NECK: CPT | Mod: 26,MA

## 2022-11-04 PROCEDURE — 99220: CPT

## 2022-11-04 PROCEDURE — 71045 X-RAY EXAM CHEST 1 VIEW: CPT | Mod: 26

## 2022-11-04 PROCEDURE — 70496 CT ANGIOGRAPHY HEAD: CPT | Mod: 26,MA

## 2022-11-04 RX ORDER — ONDANSETRON 8 MG/1
4 TABLET, FILM COATED ORAL ONCE
Refills: 0 | Status: COMPLETED | OUTPATIENT
Start: 2022-11-04 | End: 2022-11-04

## 2022-11-04 RX ORDER — MECLIZINE HCL 12.5 MG
25 TABLET ORAL ONCE
Refills: 0 | Status: COMPLETED | OUTPATIENT
Start: 2022-11-04 | End: 2022-11-04

## 2022-11-04 RX ADMIN — Medication 25 MILLIGRAM(S): at 21:20

## 2022-11-04 RX ADMIN — ONDANSETRON 4 MILLIGRAM(S): 8 TABLET, FILM COATED ORAL at 21:20

## 2022-11-04 NOTE — ED CDU PROVIDER INITIAL DAY NOTE - DETAILS
MRI, tele, echo, neuro checks, symptom management, Neuro following, DW Roit, vitals every 4 hours, frequent reevaluations

## 2022-11-04 NOTE — ED CDU PROVIDER INITIAL DAY NOTE - MEDICAL DECISION MAKING DETAILS
51yo L-handed man with a PMHx significant for TIA 2010, 2013, February 2022 s/p tpa?, August 2022 s/p tpa, HLD, and a septal defect (on Eliquis) presenting with positional, intermittent dizziness that began 11/3 in the morning. He also was complaining of gait disturbance, no fever, no chills, no headache. CT in the ER negative. Seen by neuro, recc CDU for observation and MRI tomorrow. Non focal neuro exam besides mild horizontal nystagmus. Will observe in CDU, MRI in AM. ZR

## 2022-11-04 NOTE — ED PROVIDER NOTE - CLINICAL SUMMARY MEDICAL DECISION MAKING FREE TEXT BOX
52 y old male with previous history of TIA /CVA presented with dizziness and vertigo since 9 am this morning ,nausea and vomiting ,gate disturbance ,will obtain blood work ,ct scan brain ,cta head and neck neuro cons and reassess ZR

## 2022-11-04 NOTE — ED PROVIDER NOTE - OBJECTIVE STATEMENT
52 y old male with history of HLD ,TIA and CVA 9/22 ,HX OF vertigo on Apixaban and ASA ,woke up to day with a dizziness and vertigo ,had wobbling too ,he had one episode of vomiting and feels dizzy most of the day ,no headache ,visual problem weakness anywhere or numbness

## 2022-11-04 NOTE — ED CDU PROVIDER INITIAL DAY NOTE - PHYSICAL EXAMINATION
GEN: Well Appearing, Nontoxic, NAD  HEENT: NC/AT, Symm Facies. EOMI  CV: +S1S2, RRR w/o m/g/r  RESP: CTAB w/o w/r/r  ABD: Soft, nt/nd  EXT/MSK: No lower extremity edema or calf tenderness. FROMx4  SKIN: No visible erythema, lesions or rash  Neuro: Grossly intact, AOX3 with normal speech, Sensation grossly intact, strength equal b/l UE/LE 5/5, steady gait   PSYCH: Appropriate mood and affect

## 2022-11-04 NOTE — ED ADULT NURSE NOTE - NSICDXPASTMEDICALHX_GEN_ALL_CORE_FT
PAST MEDICAL HISTORY:  CVA (cerebrovascular accident)     History of TIAs     HLD (hyperlipidemia)     Vertigo

## 2022-11-04 NOTE — CONSULT NOTE ADULT - SUBJECTIVE AND OBJECTIVE BOX
Neurology  Consult Note  11-04-22    Name:  TAMI DUNHAM; 52y (1970)    Chief Complaint:dizziness   HPI: 51yo L-handed man with a PMHx significant for CVA and TIAs (02/2022 s/p tPA), (8/2022, s/p tpa), HLD, and a septal defect (on Eliquis) presenting with positional, intermittent dizziness that began 11/3 in the morning. Patient says that when he wakes up in the morning, when laying down, he has no room spinning, but when he sits up, he feels like the room starts shifting side to side. Patient says 11/3 AM, dizziness resolved after 1 minute. On 11/4 AM, patient once again stood up from bed, and the dizziness did not go away. Patient feels like his dizziness was exacerbated with working out. Says that he vomitted once this AM had some difficulty with his gait. Denies weakness, numbness, vision changes, HA. Says he is compliant with his Eliquis, statin, ASA. Of note, when patient presented last time to [facility] as a code stroke, he had sx of left hand weakness and hypoesthesia and received tpa. MRI showed no acute infarct. Upon review of MRI imaging, no evidence of acute infarct at the time of TIA presentations. MRI significant for mild chronic white matter ischemic changes. No evidence on MRI form August 2022 of prior infarcts.     NIHSS on exam is 0. Upon examining patient, he says his dizziness was better.       Review of Systems:  As states in HPI.        PMHx:   History of TIAs    CVA (cerebrovascular accident)    HLD (hyperlipidemia)    Vertigo      PFHx:   No pertinent family history in first degree relatives      PSuHx:   No significant past surgical history      Medications:  MEDICATIONS  (STANDING):    MEDICATIONS  (PRN):    Vitals:  T(C): 36.6 (11-04-22 @ 16:01), Max: 36.6 (11-04-22 @ 16:01)  HR: 77 (11-04-22 @ 16:01) (77 - 77)  BP: 134/86 (11-04-22 @ 16:01) (134/86 - 134/86)  RR: 20 (11-04-22 @ 16:01) (20 - 20)  SpO2: 99% (11-04-22 @ 16:01) (99% - 99%)    Labs:                        14.0   6.91  )-----------( 183      ( 04 Nov 2022 20:34 )             40.9     11-04    142  |  105  |  10  ----------------------------<  98  3.4<L>   |  26  |  0.87    Ca    8.9      04 Nov 2022 20:34    TPro  7.3  /  Alb  4.2  /  TBili  0.6  /  DBili  x   /  AST  21  /  ALT  28  /  AlkPhos  74  11-04    CAPILLARY BLOOD GLUCOSE        LIVER FUNCTIONS - ( 04 Nov 2022 20:34 )  Alb: 4.2 g/dL / Pro: 7.3 g/dL / ALK PHOS: 74 U/L / ALT: 28 U/L / AST: 21 U/L / GGT: x               PHYSICAL EXAMINATION:  General: Well-developed, well nourished, in no acute distress.  Eyes: Conjunctiva and sclera clear.  Neurologic:  - Mental Status:  Alert, awake, oriented to person, place, and time; Speech is fluent with intact naming, repetition, and comprehension; Good overall fund of knowledge  - Cranial Nerves II-XII:    II:  Visual fields are full to confrontation; Pupils are equal, round, and reactive to light  III, IV, VI:  Extraocular movements are intact without nystagmus.  V:  Facial sensation is intact in the V1-V3 distribution bilaterally.  VII:  Face is symmetric with normal eye closure and smile  VIII:  Hearing is intact to finger rub.  IX, X:  Uvula is midline and soft palate rises symmetrically  XI:  Head turning and shoulder shrug are intact.  XII:  Tongue protudes in the midline.  - Motor:  Strength is 5/5 throughout.  There is no pronator drift.  Normal muscle bulk and tone throughout.  - Sensory:  Intact throughout to light touch, pin prick.  - Coordination:  Finger-nose-finger without dysmetria.   - Gait:   Normal steps, base, arm swing, and turning.   Romberg testing is negative.  -HINTS exam: no nystagmus, no vertical skew, corrective saccades b/l on head impulse testing     Radiology:   Neurology  Consult Note  11-04-22    Name:  TAMI DUNHAM; 52y (1970)    Chief Complaint:dizziness   HPI:53yo L-handed man with a PMHx significant for TIA 2010, 2013, February 2022 s/p tpa?, August 2022 s/p tpa, HLD, and a septal defect (on Eliquis) presenting with positional, intermittent dizziness that began 11/3 in the morning. Patient says that when he wakes up in the morning, when laying down, he has no room spinning, but when he sits up, he feels like the room starts shifting side to side. Patient says 11/3 AM, dizziness resolved after 1 minute. On 11/4 AM, patient once again stood up from bed, and the dizziness did not go away. Patient feels like his dizziness was exacerbated with working out. Says that he vomitted once this AM had some difficulty with his gait. Denies weakness, numbness, vision changes, HA. Says he is compliant with his Eliquis, statin, ASA. Of note, when patient presented last time to [facility] as a code stroke, he had sx of left hand weakness and hypoesthesia and received tpa. Upon review of MRI imaging, no evidence of acute infarct at the time of TIA presentations. MRI significant for mild chronic white matter ischemic changes. No evidence on MRI from August 2022 of prior infarcts.     NIHSS on exam is 0. Upon examining patient, he says his dizziness was better.       Review of Systems:  As states in HPI.        PMHx:   History of TIAs    CVA (cerebrovascular accident)    HLD (hyperlipidemia)    Vertigo      PFHx:   No pertinent family history in first degree relatives      PSuHx:   No significant past surgical history      Medications:  MEDICATIONS  (STANDING):    MEDICATIONS  (PRN):    Vitals:  T(C): 36.6 (11-04-22 @ 16:01), Max: 36.6 (11-04-22 @ 16:01)  HR: 77 (11-04-22 @ 16:01) (77 - 77)  BP: 134/86 (11-04-22 @ 16:01) (134/86 - 134/86)  RR: 20 (11-04-22 @ 16:01) (20 - 20)  SpO2: 99% (11-04-22 @ 16:01) (99% - 99%)    Labs:                        14.0   6.91  )-----------( 183      ( 04 Nov 2022 20:34 )             40.9     11-04    142  |  105  |  10  ----------------------------<  98  3.4<L>   |  26  |  0.87    Ca    8.9      04 Nov 2022 20:34    TPro  7.3  /  Alb  4.2  /  TBili  0.6  /  DBili  x   /  AST  21  /  ALT  28  /  AlkPhos  74  11-04    CAPILLARY BLOOD GLUCOSE        LIVER FUNCTIONS - ( 04 Nov 2022 20:34 )  Alb: 4.2 g/dL / Pro: 7.3 g/dL / ALK PHOS: 74 U/L / ALT: 28 U/L / AST: 21 U/L / GGT: x               PHYSICAL EXAMINATION:  General: Well-developed, well nourished, in no acute distress.  Eyes: Conjunctiva and sclera clear.  Neurologic:  - Mental Status:  Alert, awake, oriented to person, place, and time; Speech is fluent with intact naming, repetition, and comprehension; Good overall fund of knowledge  - Cranial Nerves II-XII:    II:  Visual fields are full to confrontation; Pupils are equal, round, and reactive to light  III, IV, VI:  Extraocular movements are intact without nystagmus.  V:  Facial sensation is intact in the V1-V3 distribution bilaterally.  VII:  Face is symmetric with normal eye closure and smile  VIII:  Hearing is intact to finger rub.  IX, X:  Uvula is midline and soft palate rises symmetrically  XI:  Head turning and shoulder shrug are intact.  XII:  Tongue protudes in the midline.  - Motor:  Strength is 5/5 throughout.  There is no pronator drift.  Normal muscle bulk and tone throughout.  - Sensory:  Intact throughout to light touch, pin prick.  - Coordination:  Finger-nose-finger without dysmetria.   - Gait:   Normal steps, base, arm swing, and turning.   Romberg testing is negative.  -HINTS exam: no nystagmus, no vertical skew, corrective saccades b/l on head impulse testing     Radiology:  < from: CT Angio Neck w/ IV Cont (11.04.22 @ 20:41) >    CT HEAD: Stable exam. No acute intracranial hemorrhage, vasogenic edema,   hydrocephalus or extra-axial collection.    CTA BRAIN: Stable exam. Patent intracranial circulation. No flow-limiting   stenosis or occlusion. No evidence of aneurysm.    CTA NECK: Stable exam. Patent cervical vasculature. No flow limiting   stenosis or occlusion. No evidence of dissection.

## 2022-11-04 NOTE — ED PROVIDER NOTE - NEUROLOGICAL, MLM
Patient Education     Sinusitis (Antibiotic Treatment)    The sinuses are air-filled spaces within the bones of the face. They connect to the inside of the nose. Sinusitis is an inflammation of the tissue that lines the sinuses. Sinusitis can occur during a cold. It can also happen due to allergies to pollens and other particles in the air. Sinusitis can cause symptoms of sinus congestion and a feeling of fullness. A sinus infection causes fever, headache, and facial pain. There is often green or yellow fluid draining from the nose or into the back of the throat (post-nasal drip). You have been given antibiotics to treat this condition.   Home care    Take the full course of antibiotics as instructed. Don't stop taking them, even when you feel better.    Drink plenty of water, hot tea, and other liquids as directed by the healthcare provider. This may help thin nasal mucus. It also may help your sinuses drain fluids.    Heat may help soothe painful areas of your face. Use a towel soaked in hot water. Or,  the shower and direct the warm spray onto your face. Using a vaporizer along with a menthol rub at night may also help soothe symptoms.     An expectorant with guaifenesin may help thin nasal mucus and help your sinuses drain fluids. Talk with your provider or pharmacists before taking an over-the-counter (OTC) medicine if you have any questions about it or its side effects..    You can use an OTC decongestant, unless a similar medicine was prescribed to you. Nasal sprays work the fastest. Use one that contains phenylephrine or oxymetazoline. First blow your nose gently. Then use the spray. Don't use these medicines more often than directed on the label. If you do, your symptoms may get worse. You may also take pills that contain pseudoephedrine. Don t use products that combine multiple medicines. This is because side effects may be increased. Read labels. You can also ask the pharmacist for help. (People  with high blood pressure should not use decongestants. They can raise blood pressure.) Talk with your provider or pharmacist if you have any questions about the medicine..    OTC antihistamines may help if allergies contributed to your sinusitis. Talk with your provider or pharmacist if you have any questions about the medicine..    Don't use nasal rinses or irrigation during an acute sinus infection, unless your healthcare provider tells you to. Rinsing may spread the infection to other areas in your sinuses.    Use acetaminophen or ibuprofen to control pain, unless another pain medicine was prescribed to you. If you have chronic liver or kidney disease or ever had a stomach ulcer, talk with your healthcare provider before using these medicines. Never give aspirin to anyone under age 18 who is ill with a fever. It may cause severe liver damage.    Don't smoke. This can make symptoms worse.    Follow-up care  Follow up with your healthcare provider, or as advised.   When to seek medical advice  Call your healthcare provider if any of these occur:     Facial pain or headache that gets worse    Stiff neck    Unusual drowsiness or confusion    Swelling of your forehead or eyelids    Symptoms don't go away in 10 days    Vision problems, such as blurred or double vision    Fever of 100.4 F (38 C) or higher, or as directed by your healthcare provider  Call 911  Call 911 if any of these occur:     Seizure    Trouble breathing    Feeling dizzy or faint    Fingernails, skin or lips look blue, purple , or gray  Prevention  Here are steps you can take to help prevent an infection:     Keep good hand washing habits.    Don t have close contact with people who have sore throats, colds, or other upper respiratory infections.    Don t smoke, and stay away from secondhand smoke.    Stay up to date with of your vaccines.  [employer] last reviewed this educational content on 12/1/2019 2000-2021 [employer]. All rights  reserved. This information is not intended as a substitute for professional medical care. Always follow your healthcare professional's instructions.            Alert and oriented, no focal deficits, no motor or sensory deficits. mild horizontal nystagmus

## 2022-11-04 NOTE — CONSULT NOTE ADULT - ATTENDING COMMENTS
agree with above  sent to ED after call to office from patient saying dizzy N/V.  given hx sent to ED to r/o IPH as he is on DOAC and AP    CTH neg for IPH  now pending MRI r/o post circ stroke but low suspicion.    if MRI brain neg for stroke okay to d/c home with outpatient neuro f/u with me, ENT and vestibular therapy.  would benefit from valium for few days PRN if meclizine not effective   spoke with CDU  Braxton Forde MD  Vascular Neurology  Office: [phone] agree with above  sent to ED after call to office from patient saying dizzy N/V.  given hx sent to ED to r/o IPH as he is on DOAC and AP    CTH neg for IPH  now pending MRI r/o post circ stroke but low suspicion.    if MRI brain neg for stroke okay to d/c home with outpatient neuro f/u with me, ENT and vestibular therapy.  would benefit from valium for few days PRN if meclizine not effective   c/w asa and statin therapy for secondary stroke prevention   spoke with CDU  Braxton Forde MD  Vascular Neurology  Office: [phone]

## 2022-11-04 NOTE — ED PROVIDER NOTE - BIRTH SEX
REVIEW OF SYSTEMS:  CONSTITUTIONAL: No weakness, fevers or chills  EYES/ENT: No visual changes;  No vertigo or throat pain   NECK: No pain or stiffness  RESPIRATORY: No cough, wheezing, hemoptysis; No shortness of breath  CARDIOVASCULAR: No chest pain or palpitations  GASTROINTESTINAL: per HPI; No nausea, vomiting, or hematemesis; No diarrhea or constipation. No melena or hematochezia.  GENITOURINARY: No dysuria, frequency or hematuria  NEUROLOGICAL: No numbness or weakness  SKIN: No itching, rashes
Male

## 2022-11-04 NOTE — ED CDU PROVIDER INITIAL DAY NOTE - OBJECTIVE STATEMENT
52 y old male with history of HLD, TIA/CVAs, vertigo, on Eliquis and Aspirin presents to ED with persistent dizziness/vertigo symptoms. States that at baseline he has episodes of vertigo in the morning that usually improve throughout the day. Today symptoms persisted which is unusual for him, also causing him to feel off balance and unsteady with walking. Had 1 episode of nonbloody vomiting today. Called his neurologist Dr. Forde who told him to come to the ED for evaluation. Denies pain, headaches, visual changes, numbness, weakness.   ED course: CTs negative for acute pathology. Neuro consulted and recommended MRI and echo. Patient with improving symptoms. Plan for imaging, neuro checks, and tele monitoring in CDU.

## 2022-11-04 NOTE — CONSULT NOTE ADULT - ASSESSMENT
53yo L-handed man with a PMHx significant for TIA 2010, 2013, February 2022 s/p tpa?, August 2022 s/p tpa, HLD, and a septal defect (on Eliquis) presenting with positional, intermittent dizziness that began 11/3 in the morning. Patient says that when he wakes up in the morning, when laying down, he has no room spinning, but when he sits up, he feels like the room starts shifting side to side. Patient says 11/3 AM, dizziness resolved after 1 minute. On 11/4 AM, patient once again stood up from bed, and the dizziness did not go away. Patient feels like his dizziness was exacerbated with working out. Says that he vomitted once this AM had some difficulty with his gait. Denies weakness, numbness, vision changes, HA. Says he is compliant with his Eliquis, statin, ASA. Of note, when patient presented last time to [facility] as a code stroke, he had sx of left hand weakness and hypoesthesia and received tpa. MRI showed no acute infarct.     NIHSS on exam is 0. Upon examining patient, he says his dizziness was better.     Impression: HINTS exam suggestive of peripheral vertigo. Gait was unremarkable, no dysmetria. Less suspicious for central etiology, 53yo L-handed man with a PMHx significant for TIA 2010, 2013, February 2022 s/p tpa?, August 2022 s/p tpa, HLD, and a septal defect (on Eliquis) presenting with positional, intermittent dizziness that began 11/3 in the morning. Patient says that when he wakes up in the morning, when laying down, he has no room spinning, but when he sits up, he feels like the room starts shifting side to side. Patient says 11/3 AM, dizziness resolved after 1 minute. On 11/4 AM, patient once again stood up from bed, and the dizziness did not go away. Patient feels like his dizziness was exacerbated with working out. Says that he vomitted once this AM had some difficulty with his gait. Denies weakness, numbness, vision changes, HA. Says he is compliant with his Eliquis, statin, ASA. Of note, when patient presented last time to [facility] as a code stroke, he had sx of left hand weakness and hypoesthesia and received tpa. Upon review of MRI imaging, no evidence of acute infarct at the time of TIA presentations. MRI significant for mild chronic white matter ischemic changes. No evidence on MRI from August 2022 of prior infarcts.     NIHSS on exam is 0. Upon examining patient, he says his dizziness was better.     Impression: HINTS exam suggestive of peripheral vertigo. Gait was unremarkable, no dysmetria. Neuroimaging stable. Can r/o central etiology iso multiple vascular risk factors.     Recommendations:   []CDU for MRI brain wo   []R/o toxic metabolic infectious cause   []Orthostatic vitals   []symptomatic management w/ IVF, Meclizine (antihistamine) 12.5-50 mg BID (outpatient 25mg TID-QID; max daily dose 100mg), diazepam 1mg q12 hrs PO, Reglan 4mg  PRN Q8H, Check renal function and QTc.  []TTE and cardiac work up  [] outpatient [facility].     Case to be seen by neurology attending.

## 2022-11-04 NOTE — ED CDU PROVIDER INITIAL DAY NOTE - NS ED ROS FT
Constitutional: No fever or chills  Eyes: No visual changes, eye pain   CV: No chest pain or lower extremity edema  Resp: No SOB no cough  GI: No abd pain. + nausea + vomiting. No diarrhea.   : No dysuria, hematuria.   MSK: No musculoskeletal pain  Skin: No rash  Psych: No complaints   Neuro: +dizziness +change in gait No headache. No numbness or tingling. No weakness.  Endo: No known diabetes

## 2022-11-04 NOTE — ED ADULT NURSE NOTE - OBJECTIVE STATEMENT
1727 pt 52ym aox4 bib ems dx vertigo c/o from yesterday nausea, dizzy john sitting up, pt vss no distress noted, pending eval

## 2022-11-05 DIAGNOSIS — I63.9 CEREBRAL INFARCTION, UNSPECIFIED: ICD-10-CM

## 2022-11-05 LAB
A1C WITH ESTIMATED AVERAGE GLUCOSE RESULT: 5.6 % — SIGNIFICANT CHANGE UP (ref 4–5.6)
APPEARANCE UR: CLEAR — SIGNIFICANT CHANGE UP
BACTERIA # UR AUTO: ABNORMAL
BILIRUB UR-MCNC: NEGATIVE — SIGNIFICANT CHANGE UP
CHOLEST SERPL-MCNC: 131 MG/DL — SIGNIFICANT CHANGE UP
COLOR SPEC: SIGNIFICANT CHANGE UP
DIFF PNL FLD: NEGATIVE — SIGNIFICANT CHANGE UP
EPI CELLS # UR: 0 /HPF — SIGNIFICANT CHANGE UP
ESTIMATED AVERAGE GLUCOSE: 114 MG/DL — SIGNIFICANT CHANGE UP (ref 68–114)
GLUCOSE UR QL: NEGATIVE — SIGNIFICANT CHANGE UP
HDLC SERPL-MCNC: 34 MG/DL — LOW
HYALINE CASTS # UR AUTO: 3 /LPF — HIGH (ref 0–2)
KETONES UR-MCNC: NEGATIVE — SIGNIFICANT CHANGE UP
LEUKOCYTE ESTERASE UR-ACNC: ABNORMAL
LIPID PNL WITH DIRECT LDL SERPL: 74 MG/DL — SIGNIFICANT CHANGE UP
NITRITE UR-MCNC: NEGATIVE — SIGNIFICANT CHANGE UP
NON HDL CHOLESTEROL: 97 MG/DL — SIGNIFICANT CHANGE UP
PH UR: 6.5 — SIGNIFICANT CHANGE UP (ref 5–8)
PROT UR-MCNC: NEGATIVE — SIGNIFICANT CHANGE UP
RBC CASTS # UR COMP ASSIST: 1 /HPF — SIGNIFICANT CHANGE UP (ref 0–4)
SARS-COV-2 RNA SPEC QL NAA+PROBE: SIGNIFICANT CHANGE UP
SP GR SPEC: 1.02 — SIGNIFICANT CHANGE UP (ref 1.01–1.02)
TRIGL SERPL-MCNC: 114 MG/DL — SIGNIFICANT CHANGE UP
UROBILINOGEN FLD QL: NEGATIVE — SIGNIFICANT CHANGE UP
WBC UR QL: 46 /HPF — HIGH (ref 0–5)

## 2022-11-05 PROCEDURE — 74177 CT ABD & PELVIS W/CONTRAST: CPT | Mod: 26

## 2022-11-05 PROCEDURE — 76377 3D RENDER W/INTRP POSTPROCES: CPT | Mod: 26

## 2022-11-05 PROCEDURE — G1004: CPT

## 2022-11-05 PROCEDURE — 99217: CPT

## 2022-11-05 PROCEDURE — 93970 EXTREMITY STUDY: CPT | Mod: 26

## 2022-11-05 PROCEDURE — 93306 TTE W/DOPPLER COMPLETE: CPT | Mod: 26

## 2022-11-05 PROCEDURE — 71260 CT THORAX DX C+: CPT | Mod: 26

## 2022-11-05 PROCEDURE — 70551 MRI BRAIN STEM W/O DYE: CPT | Mod: 26,MG

## 2022-11-05 RX ORDER — CEFTRIAXONE 500 MG/1
1000 INJECTION, POWDER, FOR SOLUTION INTRAMUSCULAR; INTRAVENOUS ONCE
Refills: 0 | Status: COMPLETED | OUTPATIENT
Start: 2022-11-05 | End: 2022-11-05

## 2022-11-05 RX ORDER — APIXABAN 2.5 MG/1
5 TABLET, FILM COATED ORAL
Refills: 0 | Status: DISCONTINUED | OUTPATIENT
Start: 2022-11-05 | End: 2022-11-07

## 2022-11-05 RX ORDER — CEFTRIAXONE 500 MG/1
1000 INJECTION, POWDER, FOR SOLUTION INTRAMUSCULAR; INTRAVENOUS EVERY 24 HOURS
Refills: 0 | Status: DISCONTINUED | OUTPATIENT
Start: 2022-11-06 | End: 2022-11-07

## 2022-11-05 RX ORDER — MECLIZINE HCL 12.5 MG
25 TABLET ORAL
Refills: 0 | Status: DISCONTINUED | OUTPATIENT
Start: 2022-11-05 | End: 2022-11-07

## 2022-11-05 RX ORDER — POTASSIUM CHLORIDE 20 MEQ
40 PACKET (EA) ORAL ONCE
Refills: 0 | Status: COMPLETED | OUTPATIENT
Start: 2022-11-05 | End: 2022-11-05

## 2022-11-05 RX ORDER — ATORVASTATIN CALCIUM 80 MG/1
80 TABLET, FILM COATED ORAL AT BEDTIME
Refills: 0 | Status: DISCONTINUED | OUTPATIENT
Start: 2022-11-05 | End: 2022-11-07

## 2022-11-05 RX ORDER — ATORVASTATIN CALCIUM 80 MG/1
40 TABLET, FILM COATED ORAL ONCE
Refills: 0 | Status: COMPLETED | OUTPATIENT
Start: 2022-11-05 | End: 2022-11-05

## 2022-11-05 RX ORDER — ASPIRIN/CALCIUM CARB/MAGNESIUM 324 MG
81 TABLET ORAL DAILY
Refills: 0 | Status: DISCONTINUED | OUTPATIENT
Start: 2022-11-05 | End: 2022-11-07

## 2022-11-05 RX ORDER — TAMSULOSIN HYDROCHLORIDE 0.4 MG/1
0.4 CAPSULE ORAL AT BEDTIME
Refills: 0 | Status: DISCONTINUED | OUTPATIENT
Start: 2022-11-05 | End: 2022-11-07

## 2022-11-05 RX ADMIN — APIXABAN 5 MILLIGRAM(S): 2.5 TABLET, FILM COATED ORAL at 00:56

## 2022-11-05 RX ADMIN — Medication 81 MILLIGRAM(S): at 12:05

## 2022-11-05 RX ADMIN — ATORVASTATIN CALCIUM 80 MILLIGRAM(S): 80 TABLET, FILM COATED ORAL at 21:37

## 2022-11-05 RX ADMIN — CEFTRIAXONE 100 MILLIGRAM(S): 500 INJECTION, POWDER, FOR SOLUTION INTRAMUSCULAR; INTRAVENOUS at 11:30

## 2022-11-05 RX ADMIN — APIXABAN 5 MILLIGRAM(S): 2.5 TABLET, FILM COATED ORAL at 17:10

## 2022-11-05 RX ADMIN — TAMSULOSIN HYDROCHLORIDE 0.4 MILLIGRAM(S): 0.4 CAPSULE ORAL at 21:37

## 2022-11-05 RX ADMIN — ATORVASTATIN CALCIUM 40 MILLIGRAM(S): 80 TABLET, FILM COATED ORAL at 00:55

## 2022-11-05 RX ADMIN — Medication 40 MILLIEQUIVALENT(S): at 10:27

## 2022-11-05 NOTE — ED CDU PROVIDER SUBSEQUENT DAY NOTE - HISTORY
Patient now in CDU, no acute complaints, NAD, vitals stable. Placed on cardiac monitor, normal sinus rhythm in the 70s. Plan for MRI and echo in the setting of persistent dizziness, improving. Neurology following. - Galilea Shaver PA-C

## 2022-11-05 NOTE — CONSULT NOTE ADULT - ASSESSMENT
Patient is a 53 Y/O L-handed man with PMHx significant for TIA 2010, 2013, February 2022 s/p tpa?, August 2022 s/p tpa, HLD, and a septal defect (on Eliquis) presenting with positional, intermittent dizziness that began 11/3 in the morning. Patient says that when he wakes up in the morning, when laying down, he has no room spinning, but when he sits up, he feels like the room starts shifting side to side. Patient says 11/3 AM, dizziness resolved after 1 minute. On 11/4 AM, patient once again stood up from bed, and the dizziness did not go away. Patient feels like his dizziness was exacerbated with working out. Says that he vomited once this AM had some difficulty with his gait. Denies weakness, numbness, vision changes, HA. Says he is compliant with his Eliquis, statin, ASA. Of note, when patient presented last time to [facility] as a code stroke, he had sx of left hand weakness and hypoesthesia and received tpa. Upon review of MRI imaging, no evidence of acute infarct at the time of TIA presentations. MRI significant for mild chronic white matter ischemic changes. No evidence on MRI from August 2022 of prior infarcts.     # Acute lacunar infarcts  Cre per neurology   MRI and CT noted   Eliquis, ASA and statin  CHeck Echo   tele monitoring  hypercoag W/U, [place] CT   speech and swallow eval   PT/OT    #  Septal defect  Check Echo   on eliquis       # UTI  positive UA  awaiting urine Cx  cont rocephin, total of 7 days treatment  can switch to oral after sensitivity     DVt and GI ppx

## 2022-11-05 NOTE — ED CDU PROVIDER DISPOSITION NOTE - NSFOLLOWUPINSTRUCTIONS_ED_ALL_ED_FT
Hydrate.     We recommend you follow up with your primary care provider within the next 2-3 days, please bring all of your results with you.     You can also follow up with your neurologist within the next week.     Please return to the Emergency Department with new, worsening, or concerning symptoms, such as:  -Shortness of breath or trouble breathing  -Pressure, pain, tightness in chest  -Facial drooping, arm weakness, or speech difficulty   -Head injury or loss of consciousness   -Nonstop bleeding or an open wound     *More detailed information regarding your visit and discharge can be found by reviewing this packet

## 2022-11-05 NOTE — ED CDU PROVIDER SUBSEQUENT DAY NOTE - PROGRESS NOTE DETAILS
CDU PROGRESS NOTE CLEMENTE ROMAN: pt resting comfortably without acute complaint. denies dizziness at this time. NAD. VSS. no events on tele. nonfocal exam. seen by neuro attending Dr. Forde this am - suspects peripheral origin of vertigo, will obtain MRI and dispo accordingly. CDU PROGRESS NOTE CLEMENTE ROMAN: MRI complete. ua with wbcs, many bacteria, large leuks. pt reports dysuria/frequency x2 months with recent cystoscopy, last abx use >1month ago, unknown which. given sx will start on ceftriaxone/cefpodoxime and send culture, uro followup CDU PROGRESS NOTE CLEMENTE ROMAN: MRI resulted with acute lacunar infarct. Attending aware. Patient aware. discussed with neuro, pt to be admitted to stroke service under Dr. Antony. seen by Dr. Cunha in CDU, stable for admission

## 2022-11-05 NOTE — H&P ADULT - ATTENDING COMMENTS
Patient being closely followed by Dr. Forde who has history of multiple TIA's. Since the cause of the stroke so far is unknown he was treated with Eliquis and ASA. I discussed with him risk factors for stroke such as Testosterone therapy and aderall. Both risk factors discuss at length with his vascular neurologist. He also told me has history of a neuroma for which he had surgery long time ago. He also had knee injuries and had surgeries. At one time he was evaluated at [facility] and was told he has a problem with his connective tissue. He was told his connective tissue was thin. He has some physical characteristics of perhaps collagen disorder such as long fingers, tall (states he is the tallest in his family) and his arm span seems large. He will complete stroke in the young work up along with connective tissue disorder work up. It will be perhaps helpful to obtain the records from [facility].   He also has a PFO and is being followed by Dr. Lynn.   His CT chest abdomen and pelvis was resulted and shows non specific pulmonary nodules. Patient being closely followed by Dr. Forde who has history of multiple TIA's. Since the cause of the stroke so far is unknown he was treated with Eliquis and ASA. I discussed with him risk factors for stroke such as Testosterone therapy and aderall. Both risk factors discuss at length with his vascular neurologist. He also told me has history of a neuroma for which he had surgery long time ago. He also had knee injuries and had surgeries. At one time he was evaluated at [facility] and was told he has a problem with his connective tissue. He was told his connective tissue was thin. He has some physical characteristics of perhaps collagen disorder such as long fingers, tall (states he is the tallest in his family) and his arm span seems large. He will complete stroke in the young work up along with connective tissue disorder work up. It will be perhaps helpful to obtain the records from [facility].   He also has a PFO and is being followed by Dr. Lynn.   His CT chest abdomen and pelvis was resulted and shows non specific pulmonary nodules.  If the inflammatory work up is concerning for some sort of vasculitis although there is no clinical signs or symptoms of such perhaps angiography may be helpful, only if inflammatory work up is positive.

## 2022-11-05 NOTE — ED CDU PROVIDER DISPOSITION NOTE - CLINICAL COURSE
52 y old male with history of HLD, TIA/CVAs, vertigo, on Eliquis and Aspirin presents to ED with persistent dizziness/vertigo symptoms. States that at baseline he has episodes of vertigo in the morning that usually improve throughout the day. Today symptoms persisted which is unusual for him, also causing him to feel off balance and unsteady with walking. Had 1 episode of nonbloody vomiting today. Called his neurologist Dr. Forde who told him to come to the ED for evaluation. Denies pain, headaches, visual changes, numbness, weakness.   ED course: CTs negative for acute pathology. Neuro consulted and recommended MRI and echo. Patient with improving symptoms. Plan for imaging, neuro checks, and tele monitoring in CDU. 52 y old male with history of HLD, TIA/CVAs, vertigo, on Eliquis and Aspirin presents to ED with persistent dizziness/vertigo symptoms. States that at baseline he has episodes of vertigo in the morning that usually improve throughout the day. Today symptoms persisted which is unusual for him, also causing him to feel off balance and unsteady with walking. Had 1 episode of nonbloody vomiting today. Called his neurologist Dr. Forde who told him to come to the ED for evaluation. Denies pain, headaches, visual changes, numbness, weakness.   ED course: CTs negative for acute pathology. Neuro consulted and recommended MRI and echo. Patient with improving symptoms. Plan for imaging, neuro checks, and tele monitoring in CDU.  In CDU neuro checks and vitals were stable. MRI revealed acute lacunar infarcts. pt was seen by neurology with Dr Forde, recommended admission to stroke service. seen by Dr. Cunha in CDU. stable for admission

## 2022-11-05 NOTE — H&P ADULT - HISTORY OF PRESENT ILLNESS
Per chart documentation 11/4:  51yo L-handed man with a PMHx significant for TIA 2010, 2013, February 2022 s/p tpa?, August 2022 s/p tpa, HLD, and a septal defect (on Eliquis) presenting with positional, intermittent dizziness that began 11/3 in the morning. Patient says that when he wakes up in the morning, when laying down, he has no room spinning, but when he sits up, he feels like the room starts shifting side to side. Patient says 11/3 AM, dizziness resolved after 1 minute. On 11/4 AM, patient once again stood up from bed, and the dizziness did not go away. Patient feels like his dizziness was exacerbated with working out. Says that he vomited once this AM had some difficulty with his gait. Denies weakness, numbness, vision changes, HA. Says he is compliant with his Eliquis, statin, ASA. Of note, when patient presented last time to [facility] as a code stroke, he had sx of left hand weakness and hypoesthesia and received tpa. Upon review of MRI imaging, no evidence of acute infarct at the time of TIA presentations. MRI significant for mild chronic white matter ischemic changes. No evidence on MRI from August 2022 of prior infarcts.     NIHSS on exam is 0. Upon examining patient, he says his dizziness was better.

## 2022-11-05 NOTE — H&P ADULT - ASSESSMENT
53yo L-handed man with a PMHx significant for TIA 2010, 2013, February 2022 s/p tpa?, August 2022 s/p tpa, HLD, and a septal defect (on Eliquis) presenting with positional, intermittent dizziness that began 11/3 in the morning. Patient says that when he wakes up in the morning, when laying down, he has no room spinning, but when he sits up, he feels like the room starts shifting side to side. Patient says 11/3 AM, dizziness resolved after 1 minute. On 11/4 AM, patient once again stood up from bed, and the dizziness did not go away. Patient feels like his dizziness was exacerbated with working out. Says that he vomitted once this AM had some difficulty with his gait. Denies weakness, numbness, vision changes, HA. Says he is compliant with his Eliquis, statin, ASA.   (Of note, when patient presented last time to [facility] as a code stroke, he had sx of left hand weakness and hypoesthesia and received tpa. Upon review of MRI imaging, no evidence of acute infarct at the time of TIA presentations. MRI significant for mild chronic white matter ischemic changes. No evidence on MRI from August 2022 of prior infarcts. )    NIHSS on arrival 0. Patient had reported improvement in his dizziness.    Impression:  Gait was unremarkable, no dysmetria. MRI obtained to r/o central etiology iso multiple vascular risk factors: Acute lacunar infarcts found in right centrum semiovale and right posterior frontal subcortical region.    Recommendations:   [] Admit to stroke service  [] TTE w/ bubble   [] Lipid panel, HbA1c  [] CBC, CMP, coagulation panel, troponin  [] ASA 81 mg PO (325 per rectum if fails dysphagia)   [] Atorvastatin 80mg (titrate to LDL < 70)   [] Lovenox SQ for DVT prophylaxis   [] NPO unless passes dysphagia screen; swallow eval if fails  [] Keep BP permissive up to 220/110 for 24 hours followed by gradual normotension over 2-3 days   [] Telemonitoring  [] Neurological checks and vital signs per unit protocol  [] BGM goals 140-180  [] PT/OT; S/S evaluation    Patient to be seen on AM neurology rounds.  * Case and plan not final until Attending attestation *   51yo L-handed man with a PMHx significant for TIA 2010, 2013, February 2022 s/p tpa?, August 2022 s/p tpa, HLD, and a septal defect (on Eliquis) presenting with positional, intermittent dizziness that began 11/3 in the morning. Patient says that when he wakes up in the morning, when laying down, he has no room spinning, but when he sits up, he feels like the room starts shifting side to side. Patient says 11/3 AM, dizziness resolved after 1 minute. On 11/4 AM, patient once again stood up from bed, and the dizziness did not go away. Patient feels like his dizziness was exacerbated with working out. Says that he vomitted once this AM had some difficulty with his gait. Denies weakness, numbness, vision changes, HA. Says he is compliant with his Eliquis, statin, ASA.   (Of note, when patient presented last time to [facility] as a code stroke, he had sx of left hand weakness and hypoesthesia and received tpa. Upon review of MRI imaging, no evidence of acute infarct at the time of TIA presentations. MRI significant for mild chronic white matter ischemic changes. No evidence on MRI from August 2022 of prior infarcts. )    NIHSS on arrival 0. Patient had reported improvement in his dizziness.    Impression:  Gait was unremarkable, no dysmetria. MRI obtained to r/o central etiology iso multiple vascular risk factors: Acute lacunar infarcts found in right centrum semiovale and right posterior frontal subcortical region.    Recommendations:   [] Admit to stroke service  [] MIMI  [] Lipid panel, HbA1c  [] CBC, CMP, coagulation panel, troponin  [] Continue with ASA 81 mg PO (325 per rectum if fails dysphagia) for now  [] Continue with Eliquis 5mg BID  [] Atorvastatin 80mg (titrate to LDL < 70)   [] Lovenox SQ for DVT prophylaxis   [] Hypercoagulable workup, CT c/a/p  [] NPO unless passes dysphagia screen; swallow eval if fails  [] Keep BP permissive up to 220/110 for 24 hours followed by gradual normotension over 2-3 days   [] Telemonitoring  [] Neurological checks and vital signs per unit protocol  [] BGM goals 140-180  [] PT/OT; S/S evaluation  [] c/w ceftriaxone for UTI  [] follow up on UCx         Patient to be seen on AM neurology rounds.  * Case and plan not final until Attending attestation *

## 2022-11-05 NOTE — ED ADULT NURSE REASSESSMENT NOTE - NS ED NURSE REASSESS COMMENT FT1
Report Given to MARK Mckeon on 4 Harmon, Pt aox3. no complaints of pain, belongings taken with him to floor

## 2022-11-05 NOTE — ED CDU PROVIDER SUBSEQUENT DAY NOTE - WR ORDER STATUS 1
[facility]    May 3, 2018    Pain Management Addiction Managment Follow Up    CHIEF COMPLAINT:  Chief Complaint   Patient presents with   • Procedure     Vivitrol Injection   • Follow-up     history of heroin addiction associated opioid use disorder.       HISTORY OF PRESENT ILLNESS:  The patient is a 40 year old male who has a history of heroin addiction and associated severe opioid use disorder. Patient is on Vivitrol maintenance. Patient returns clinic today for follow-up and intramuscular Vivitrol. Patient reports he continues to do well. He denies any significant cravings. He denies any relapse. He reports that overall things are continuing to improve. His primary concern and triggers related to pain. Patient's initial use of opioids was related to physical pain and prescriptions. Patient does do physical work as an . He does report having joint pain and back pain typically more axial mechanical worse at the end of the day. He is completed IOP program through [facility] and does continue to take meetings on a weekly basis. He has not obtained a sponsor. He has found a coworker however who is in recovery that he has also reached out to. He describes feeling different being sober. He finds that there are some things at work that will irritate him more but they are related to specific things were in the past when he was using he describes being irritable in general..    · Any concerns with relapse or cravings-  No  · Any significant change in your lifestyle since your last visit with Dr. Cruz - No  · Any sleep disturbance - No  · Any aberrant behavior - No  · Any positive urine drug screen-  Yes, date:  03/09/2018 + cocaine   · Pill count performed - No     Past Medical History, Past Surgical History, Family History and Social History: Reviewed and updated in Epic.    ALLERGIES:   Allergen Reactions   • Penicillins        MEDICATIONS:    Current Outpatient Prescriptions on File Prior  to Encounter:  traZODone (DESYREL) 100 MG tablet   nalTREXone (VIVITROL) 380 MG injection     No current facility-administered medications on file prior to encounter.     REVIEW OF SYSTEMS:  Pertinent positive ROS are check marked ([x]).  All other ROS are negative.    Constitutional:  []  Chills  []  Fatigue  []  Fever  []  Insomnia  []  Weight Gain  []  Weight Loss    Musculoskeletal:  []  Joint Pain  []  Leg Pain  []  Arm Pain  []  Low Back Pain  []  Mid Back Pain  []  Neck Pain  []  Cold Feet  []  Varicose Veins   Neuro/Psychiatric:   []  Anxiety  []  Depression  []  Fainting  []  Headache  []  Clumsiness  []  Memory Loss  []  Seizures  []  Weakness             PHYSICAL EXAMINATION:  VITALS:   Visit Vitals  /54 ([place] [facility] [place]itting)   Pulse 54   Temp 98.8 °F (37.1 °C) (Temporal Artery)   Wt 80.3 kg   SpO2 98%   BMI 25.40 kg/m²       GENERAL: Reveals a well-developed, well-nourished male who is casually dressed seated comfortably in exam room in no apparent distress.  HEENT: He pulls neither dilated nor constricted for room light. Extraocular muscles intact without nystagmus.  SKIN: Warm, dry and intact. There are no rashes, lesions or ulcerations noted.  EXTREMITIES: No clubbing, cyanosis or edema noted.  MUSCULOSKELETAL: Patient demonstrates full range of motion of the cervical spine. Soft nontender across lumbar sacral paravertebral muscle groups. No guarding or spasm. Preserved range of motion of the lumbar spine. No upper extremity synovitis  NEUROLOGIC: Patient is alert and oriented times 3. Speech is fluent. The patient is able to obtain full strength. The patient demonstrates normal gait and station.    DIAGNOSTICS:  Point of care urine drug screen is negative.    DISCUSSION:  I congratulated him on his continued engagement in weekly meetings. I reinforced the importance of a well-established and maintaining recovery program. I encouraged him to find a sponsor. I discussed  the need to develop a positive healthy alternative coping skills to facilitate long-term recovery. We discussed alternative coping and treatment options related to his comorbid pain complaints. I also discussed at this time to begin working on strategies for a safety plan to maintaining sobriety. He describes people and places his potential triggers. This was uses an example to develop a prescription should he run into somebody that he knows that is using or may put him at risk for using and how to address that situation.    IMPRESSION:  1. Severe opioid use disorder on maintenance therapy (CMS/HCC)    2. Cocaine use disorder, moderate, dependence (CMS/HCC)      Patient doing well stable in early recovery    RECOMMENDATIONS:  Orders Placed This Encounter   • Urine Drug Prescription/OTC   • nalTREXone (VIVITROL) IM injection     New Prescriptions    No medications on file     Return in about 28 days (around 5/31/2018) for vivitrol injection.    · Continue medications as previously prescribed.    Face to face time with the patient was 25 minutes with greater than 50% spent in discussion, patient education and counseling.    Kartik Cruz MD  [facility]     Resulted

## 2022-11-05 NOTE — H&P ADULT - NSHPREVIEWOFSYSTEMS_GEN_ALL_CORE
positional, intermittent dizziness: feels like the room starts shifting side to side.  r  nausea and vomiting  gait difficulty    Denies weakness, numbness, vision changes, headache.

## 2022-11-05 NOTE — ED CDU PROVIDER DISPOSITION NOTE - ATTENDING APP SHARED VISIT CONTRIBUTION OF CARE
Attending MD Cunha:   I personally have seen and examined this patient.  Physician assistant note reviewed and agree on plan of care and except where noted.  See below for details.     Seen in CDU Liam Davis 45    52M with PMH/PSH including HLD, vertigo, TIA/CVAs on Eliquis and Aspirin sent to the CDU after presenting to the ED with dizziness, vertiginous symptoms.  In the Emergency Department, CTs non actionable, labs notable for K 3.4.  Seen by neuro, sent to CDU for MRI, neuro checks.  UA this AM noted, large LE, WBC 46, many bacteria.  Patient reports feeling improved, denies dizziness at time of evaluation.  Denies chest pain, shortness of breath, abdominal pain, nausea, vomiting, diarrhea, fevers.  Reports months of urinary frequency, reports has had cystoscopy, has had previous abx courses, follows with uro.     Exam:   General: NAD  HENT: head NCAT, airway patent  Eyes: no conjunctival injection, anicteric, PERRL, EOMI   Lungs: lungs CTAB with good inspiratory effort, no wheezing, no rhonchi, no rales  Cardiac: +S1S2, no obvious m/r/g  GI: abdomen soft with +BS, NT, ND  : no CVAT  MSK: ranging neck and extremities freely   Neuro: moving all extremities spontaneously, sensory grossly intact, no gross neuro deficits, ambulating with steady gait  Psych: normal mood and affect    A/P: 52M with dizziness, MRI showing acute lacunar infarct, seen by neuro, recommend admission to stroke service.

## 2022-11-05 NOTE — ED ADULT NURSE REASSESSMENT NOTE - NS ED NURSE REASSESS COMMENT FT1
Received pt at 7am, pt aox3, no complaints of pain, VSS, NSR on monitor, pt pending an MRI and Eccho

## 2022-11-05 NOTE — H&P ADULT - NSHPLABSRESULTS_GEN_ALL_CORE
< from: CT Angio Neck w/ IV Cont (11.04.22 @ 20:41) >    CT HEAD: Stable exam. No acute intracranial hemorrhage, vasogenic edema,   hydrocephalus or extra-axial collection.    CTA BRAIN: Stable exam. Patent intracranial circulation. No flow-limiting   stenosis or occlusion. No evidence of aneurysm.    CTA NECK: Stable exam. Patent cervical vasculature. No flow limiting   stenosis or occlusion. No evidence of dissection.            INTERPRETATION: Clinical indication: Vertigo.    MRI of the brain was performed using sagittal T1 axial T1 T2 T2 FLAIR diffusion and susceptibility weighted sequence.    This exam is compared prior head CT performed on November 4, 2022 and prior brain MRI performed on August 15, 2022.    Parenchymal volume loss out of proportion to the patient's age is seen.    Subcentimeter foci of T2 prolongation with restricted diffusion is suspected involving the right centrum semiovale and right posterior frontal subcortical region. These findings are likely compatible with acute lacunar infarcts. No hemorrhagic transformation seen. No significant shift or herniation is seen.    There are no abnormal intra or extra-axial collections    The large vessels demonstrate normal flow voids.    Bilateral ethmoid and frontal sinus mucosal thickening is seen.    Both mastoid and middle ear regions appear clear.    IMPRESSION: Acute lacunar infarcts suspected as described above.

## 2022-11-05 NOTE — ED CDU PROVIDER SUBSEQUENT DAY NOTE - ATTENDING APP SHARED VISIT CONTRIBUTION OF CARE
Attending MD Cunha:   I personally have seen and examined this patient.  Physician assistant note reviewed and agree on plan of care and except where noted.  See below for details.     Seen in CDU Liam Davis 45    52M with PMH/PSH including HLD, vertigo, TIA/CVAs on Eliquis and Aspirin sent to the CDU after presenting to the ED with dizziness, vertiginous symptoms.  In the Emergency Department, CTs non actionable, labs notable for K 3.4.  Seen by neuro, sent to CDU for MRI, neuro checks.  UA this AM noted, large LE, WBC 46, many bacteria.  Patient reports feeling improved, denies dizziness at time of evaluation.  Denies chest pain, shortness of breath, abdominal pain, nausea, vomiting, diarrhea, urinary complaints, fevers.    Exam:   General: NAD  HENT: head NCAT, airway patent  Eyes: no conjunctival injection, anicteric, PERRL, EOMI   Lungs: lungs CTAB with good inspiratory effort, no wheezing, no rhonchi, no rales  Cardiac: +S1S2, no obvious m/r/g  GI: abdomen soft with +BS, NT, ND  : no CVAT  MSK: ranging neck and extremities freely   Neuro: moving all extremities spontaneously, sensory grossly intact, no gross neuro deficits, ambulating with steady gait  Psych: normal mood and affect    A/P: 52M with dizziness, pending MRI today, will await MRI results and final neuro recs.  Will replete K, will give abx for UTI. Attending MD Cunha:   I personally have seen and examined this patient.  Physician assistant note reviewed and agree on plan of care and except where noted.  See below for details.     Seen in CDU Liam Davis 45    52M with PMH/PSH including HLD, vertigo, TIA/CVAs on Eliquis and Aspirin sent to the CDU after presenting to the ED with dizziness, vertiginous symptoms.  In the Emergency Department, CTs non actionable, labs notable for K 3.4.  Seen by neuro, sent to CDU for MRI, neuro checks.  UA this AM noted, large LE, WBC 46, many bacteria.  Patient reports feeling improved, denies dizziness at time of evaluation.  Denies chest pain, shortness of breath, abdominal pain, nausea, vomiting, diarrhea, fevers.  Reports months of urinary frequency, reports has had cystoscopy, has had previous abx courses, follows with uro.     Exam:   General: NAD  HENT: head NCAT, airway patent  Eyes: no conjunctival injection, anicteric, PERRL, EOMI   Lungs: lungs CTAB with good inspiratory effort, no wheezing, no rhonchi, no rales  Cardiac: +S1S2, no obvious m/r/g  GI: abdomen soft with +BS, NT, ND  : no CVAT  MSK: ranging neck and extremities freely   Neuro: moving all extremities spontaneously, sensory grossly intact, no gross neuro deficits, ambulating with steady gait  Psych: normal mood and affect    A/P: 52M with dizziness, pending MRI today, will await MRI results and final neuro recs.  Will replete K, will give abx for +UA/UTI.

## 2022-11-05 NOTE — PATIENT PROFILE ADULT - NSPROGENOTHERPROVIDER_GEN_A_NUR
Spoke with patient to report results of the recent minor surgery. Patient reports all healing well.  The pathology report revealed actinic keratosis of the lip.  Patient instructed to call back if there are any  concerns with regard to the continuing healing process. Routine postoperative visit is not required.   none

## 2022-11-05 NOTE — CONSULT NOTE ADULT - SUBJECTIVE AND OBJECTIVE BOX
Patient is a 53 Y/O L-handed man with PMHx significant for TIA , , 2022 s/p tpa?, 2022 s/p tpa, HLD, and a septal defect (on Eliquis) presenting with positional, intermittent dizziness that began 11/3 in the morning. Patient says that when he wakes up in the morning, when laying down, he has no room spinning, but when he sits up, he feels like the room starts shifting side to side. Patient says 11/3 AM, dizziness resolved after 1 minute. On  AM, patient once again stood up from bed, and the dizziness did not go away. Patient feels like his dizziness was exacerbated with working out. Says that he vomited once this AM had some difficulty with his gait. Denies weakness, numbness, vision changes, HA. Says he is compliant with his Eliquis, statin, ASA. Of note, when patient presented last time to [facility] as a code stroke, he had sx of left hand weakness and hypoesthesia and received tpa. Upon review of MRI imaging, no evidence of acute infarct at the time of TIA presentations. MRI significant for mild chronic white matter ischemic changes. No evidence on MRI from 2022 of prior infarcts.     PAST MEDICAL & SURGICAL HISTORY:  History of TIAs  CVA (cerebrovascular accident)  HLD (hyperlipidemia)  Vertigo  No significant past surgical history      MEDICATIONS  (STANDING):  apixaban 5 milliGRAM(s) Oral two times a day  aspirin  chewable 81 milliGRAM(s) Oral daily  atorvastatin 80 milliGRAM(s) Oral at bedtime  cefTRIAXone   IVPB 1000 milliGRAM(s) IV Intermittent every 24 hours  tamsulosin 0.4 milliGRAM(s) Oral at bedtime    MEDICATIONS  (PRN):  meclizine 25 milliGRAM(s) Oral two times a day PRN Dizziness    Home Medications:  Adderall:  (2022 13:15)  apixaban 5 mg oral tablet: 1 tab(s) orally every 12 hours (2022 00:55)  aspirin 81 mg oral delayed release tablet: 1 tab(s) orally once a day (2022 00:55)  atorvastatin 80 mg oral tablet: 0.5 tab(s) orally once a day (at bedtime) (2022 13:15)  Flomax:  (2022 13:15)  testosterone:  (2022 13:15)    Allergies    No Known Allergies    Intolerances    Vital Signs Last 24 Hrs  T(C): 36.7 (2022 16:15), Max: 36.7 (2022 04:29)  T(F): 98 (2022 16:15), Max: 98.1 (2022 04:29)  HR: 69 (2022 16:15) (64 - 72)  BP: 120/87 (2022 16:15) (112/68 - 135/91)  BP(mean): --  RR: 18 (2022 16:15) (16 - 18)  SpO2: 97% (2022 16:15) (97% - 99%)    Appearance: Normal	  HEENT:   Normal oral mucosa, PERRL, EOMI	  Lymphatic: No lymphadenopathy , no edema  Cardiovascular: Normal S1 S2, No JVD, No murmurs , Peripheral pulses palpable 2+ bilaterally  Respiratory: Lungs clear to auscultation, normal effort 	  Gastrointestinal:  Soft, Non-tender, + BS	  Skin: No rashes, No ecchymoses, No cyanosis, warm to touch  Musculoskeletal: Normal range of motion, normal strength  Psychiatry:  Mood & affect appropriate  Ext: No edema                          14.0   6.91  )-----------( 183      ( 2022 20:34 )             40.9               11-04    142  |  105  |  10  ----------------------------<  98  3.4<L>   |  26  |  0.87    Ca    8.9      2022 20:34    TPro  7.3  /  Alb  4.2  /  TBili  0.6  /  DBili  x   /  AST  21  /  ALT  28  /  AlkPhos  74  11-04                       Urinalysis Basic - ( 2022 07:16 )    Color: Light Yellow / Appearance: Clear / S.025 / pH: x  Gluc: x / Ketone: Negative  / Bili: Negative / Urobili: Negative   Blood: x / Protein: Negative / Nitrite: Negative   Leuk Esterase: Large / RBC: 1 /hpf / WBC 46 /HPF   Sq Epi: x / Non Sq Epi: 0 /hpf / Bacteria: Many    < from: MR Head No Cont (22 @ 10:49) >  IMPRESSION: Acute lacunar infarcts suspected as described above.        < from: CT Head No Cont (22 @ 20:43) >  IMPRESSION:    CT HEAD: Stable exam. No acute intracranial hemorrhage, vasogenic edema,   hydrocephalus or extra-axial collection.    CTA BRAIN: Stable exam. Patent intracranial circulation. No flow-limiting   stenosis or occlusion. No evidence of aneurysm.    CTA NECK: Stable exam. Patent cervical vasculature. No flow limiting   stenosis or occlusion. No evidence of dissection.    NASCET CAROTID STENOSIS CRITERIA (distal normal appearing ICA as   denominator for measurement): 0%-none, 1-49%-mild, 50-70%-moderate,   70-89%-severe, 90-99%-critical.

## 2022-11-05 NOTE — PATIENT PROFILE ADULT - FALL HARM RISK - UNIVERSAL INTERVENTIONS
Bed in lowest position, wheels locked, appropriate side rails in place/Call bell, personal items and telephone in reach/Instruct patient to call for assistance before getting out of bed or chair/Non-slip footwear when patient is out of bed/[place] to call system/Physically safe environment - no spills, clutter or unnecessary equipment/Purposeful Proactive Rounding/Room/bathroom lighting operational, light cord in reach

## 2022-11-05 NOTE — H&P ADULT - NSHPPHYSICALEXAM_GEN_ALL_CORE
Per note 11/4  General: Well-developed, well nourished, in no acute distress.  Eyes: Conjunctiva and sclera clear.  Neurologic:  - Mental Status:  Alert, awake, oriented to person, place, and time; Speech is fluent with intact naming, repetition, and comprehension; Good overall fund of knowledge  - Cranial Nerves II-XII:    II:  Visual fields are full to confrontation; Pupils are equal, round, and reactive to light  III, IV, VI:  Extraocular movements are intact without nystagmus.  V:  Facial sensation is intact in the V1-V3 distribution bilaterally.  VII:  Face is symmetric with normal eye closure and smile  VIII:  Hearing is intact to finger rub.  IX, X:  Uvula is midline and soft palate rises symmetrically  XI:  Head turning and shoulder shrug are intact.  XII:  Tongue protudes in the midline.  - Motor:  Strength is 5/5 throughout.  There is no pronator drift.  Normal muscle bulk and tone throughout.  - Sensory:  Intact throughout to light touch, pin prick.  - Coordination:  Finger-nose-finger without dysmetria.   - Gait:   Normal steps, base, arm swing, and turning.   Romberg testing is negative.  -HINTS exam: no nystagmus, no vertical skew, corrective saccades b/l on head impulse testing

## 2022-11-06 DIAGNOSIS — Q21.12 PATENT FORAMEN OVALE: ICD-10-CM

## 2022-11-06 DIAGNOSIS — I63.9 CEREBRAL INFARCTION, UNSPECIFIED: ICD-10-CM

## 2022-11-06 DIAGNOSIS — E78.5 HYPERLIPIDEMIA, UNSPECIFIED: ICD-10-CM

## 2022-11-06 LAB
ANION GAP SERPL CALC-SCNC: 11 MMOL/L — SIGNIFICANT CHANGE UP (ref 5–17)
BUN SERPL-MCNC: 14 MG/DL — SIGNIFICANT CHANGE UP (ref 7–23)
CALCIUM SERPL-MCNC: 8.6 MG/DL — SIGNIFICANT CHANGE UP (ref 8.4–10.5)
CHLORIDE SERPL-SCNC: 106 MMOL/L — SIGNIFICANT CHANGE UP (ref 96–108)
CO2 SERPL-SCNC: 23 MMOL/L — SIGNIFICANT CHANGE UP (ref 22–31)
CREAT SERPL-MCNC: 0.95 MG/DL — SIGNIFICANT CHANGE UP (ref 0.5–1.3)
EGFR: 96 ML/MIN/1.73M2 — SIGNIFICANT CHANGE UP
GLUCOSE SERPL-MCNC: 88 MG/DL — SIGNIFICANT CHANGE UP (ref 70–99)
HCT VFR BLD CALC: 41 % — SIGNIFICANT CHANGE UP (ref 39–50)
HCYS SERPL-MCNC: 9.8 UMOL/L — SIGNIFICANT CHANGE UP
HGB BLD-MCNC: 13.4 G/DL — SIGNIFICANT CHANGE UP (ref 13–17)
MAGNESIUM SERPL-MCNC: 2.2 MG/DL — SIGNIFICANT CHANGE UP (ref 1.6–2.6)
MCHC RBC-ENTMCNC: 29 PG — SIGNIFICANT CHANGE UP (ref 27–34)
MCHC RBC-ENTMCNC: 32.7 GM/DL — SIGNIFICANT CHANGE UP (ref 32–36)
MCV RBC AUTO: 88.7 FL — SIGNIFICANT CHANGE UP (ref 80–100)
NRBC # BLD: 0 /100 WBCS — SIGNIFICANT CHANGE UP (ref 0–0)
PLATELET # BLD AUTO: 181 K/UL — SIGNIFICANT CHANGE UP (ref 150–400)
POTASSIUM SERPL-MCNC: 3.7 MMOL/L — SIGNIFICANT CHANGE UP (ref 3.5–5.3)
POTASSIUM SERPL-SCNC: 3.7 MMOL/L — SIGNIFICANT CHANGE UP (ref 3.5–5.3)
RBC # BLD: 4.62 M/UL — SIGNIFICANT CHANGE UP (ref 4.2–5.8)
RBC # FLD: 13.3 % — SIGNIFICANT CHANGE UP (ref 10.3–14.5)
SODIUM SERPL-SCNC: 140 MMOL/L — SIGNIFICANT CHANGE UP (ref 135–145)
WBC # BLD: 5.66 K/UL — SIGNIFICANT CHANGE UP (ref 3.8–10.5)
WBC # FLD AUTO: 5.66 K/UL — SIGNIFICANT CHANGE UP (ref 3.8–10.5)

## 2022-11-06 PROCEDURE — G0452: CPT | Mod: 26

## 2022-11-06 PROCEDURE — 99223 1ST HOSP IP/OBS HIGH 75: CPT

## 2022-11-06 RX ADMIN — CEFTRIAXONE 100 MILLIGRAM(S): 500 INJECTION, POWDER, FOR SOLUTION INTRAMUSCULAR; INTRAVENOUS at 12:34

## 2022-11-06 RX ADMIN — APIXABAN 5 MILLIGRAM(S): 2.5 TABLET, FILM COATED ORAL at 17:06

## 2022-11-06 RX ADMIN — Medication 81 MILLIGRAM(S): at 12:34

## 2022-11-06 RX ADMIN — APIXABAN 5 MILLIGRAM(S): 2.5 TABLET, FILM COATED ORAL at 05:27

## 2022-11-06 RX ADMIN — ATORVASTATIN CALCIUM 80 MILLIGRAM(S): 80 TABLET, FILM COATED ORAL at 22:01

## 2022-11-06 RX ADMIN — TAMSULOSIN HYDROCHLORIDE 0.4 MILLIGRAM(S): 0.4 CAPSULE ORAL at 22:02

## 2022-11-06 NOTE — PROGRESS NOTE ADULT - ASSESSMENT
Patient is a 53 Y/O L-handed man with PMHx significant for TIA 2010, 2013, February 2022 s/p tpa?, August 2022 s/p tpa, HLD, and a septal defect (on Eliquis) presenting with positional, intermittent dizziness that began 11/3 in the morning. Patient says that when he wakes up in the morning, when laying down, he has no room spinning, but when he sits up, he feels like the room starts shifting side to side. Patient says 11/3 AM, dizziness resolved after 1 minute. On 11/4 AM, patient once again stood up from bed, and the dizziness did not go away. Patient feels like his dizziness was exacerbated with working out. Says that he vomited once this AM had some difficulty with his gait. Denies weakness, numbness, vision changes, HA. Says he is compliant with his Eliquis, statin, ASA. Of note, when patient presented last time to [facility] as a code stroke, he had sx of left hand weakness and hypoesthesia and received tpa. Upon review of MRI imaging, no evidence of acute infarct at the time of TIA presentations. MRI significant for mild chronic white matter ischemic changes. No evidence on MRI from August 2022 of prior infarcts.     # Acute lacunar infarcts  Care asper neurology   MRI and CT noted   Eliquis, ASA and statin  TTE as above with EF of 57%, Normal LVS Function, Minimal TR, No Segmental WMA  tele monitoring  hypercoag W/U, [place] CT --> Pending   speech and swallow eval   PT/OT  ILR to r/o occult arrythmia  Hypercoag work up     #  Septal defect/ PFO  Check Echo   on eliquis   - Structural heart eval per general cardio       # UTI  positive UA  awaiting urine Cx --> Ordered, patient is on Rocephin, results may be incorrect   cont rocephin, total of 7 days treatment  can switch to oral after sensitivity     DVt and GI ppx

## 2022-11-06 NOTE — SPEECH LANGUAGE PATHOLOGY EVALUATION - SLP PERTINENT HISTORY OF CURRENT PROBLEM
51yo L-handed man with a PMHx significant for TIA 2010, 2013, February 2022 s/p tpa?, August 2022 s/p tpa, HLD, and a septal defect (on Eliquis) presenting with positional, intermittent dizziness that began 11/3 in the morning. Neuro consulted for dizziness IMPRESSION: NIHSS on exam is 0. Upon examining patient, he says his dizziness was better. HINTS exam suggestive of peripheral vertigo. Gait was unremarkable, no dysmetria. Neuroimaging stable. Can r/o central etiology iso multiple vascular risk factors. MRI revealed acute lacunar infarcts found in right centrum semiovale and right posterior frontal subcortical region. Pt was seen by neurology with Dr Forde, recommended admission to stroke service. Medicine consulted for medical management IMPRESSION: continue abx for UTI. Cardiology consulted for stroke IMPRESSION: consider ILR pending progress and outcome hypercoagulable workup;

## 2022-11-06 NOTE — PHYSICAL THERAPY INITIAL EVALUATION ADULT - GENERAL OBSERVATIONS, REHAB EVAL
Pt received semi-supine in bed in NAD, +tele, +IVL, VSS, agreeable to participate in therapy at this time

## 2022-11-06 NOTE — PHYSICAL THERAPY INITIAL EVALUATION ADULT - PERTINENT HX OF CURRENT PROBLEM, REHAB EVAL
51yo L-handed man with a PMHx significant for TIA 2010, 2013, February 2022 s/p tpa?, August 2022 s/p tpa, HLD, and a septal defect (on Eliquis) presenting with positional, intermittent dizziness that began 11/3 in the morning. Patient says that when he wakes up in the morning, when laying down, he has no room spinning, but when he sits up, he feels like the room starts shifting side to side. Patient says 11/3 AM, dizziness resolved after 1 minute. On 11/4 AM, patient once again stood up from bed, and the dizziness did not go away. Patient feels like his dizziness was exacerbated with working out. Says that he vomited once this AM had some difficulty with his gait. Denies weakness, numbness, vision changes, HA. Says he is compliant with his Eliquis, statin, ASA. Of note, when patient presented last time to [facility] as a code stroke, he had sx of left hand weakness and hypoesthesia and received tpa. Upon review of MRI imaging, no evidence of acute infarct at the time of TIA presentations. MRI significant for mild chronic white matter ischemic changes. No evidence on MRI from August 2022 of prior infarcts.

## 2022-11-06 NOTE — PHYSICAL THERAPY INITIAL EVALUATION ADULT - GROSSLY INTACT, SENSORY
Contacted parent to confirm tomorrow's appts with Endo and Infusion center. Parent verbalized understanding.     
Left UE/Right UE/Left LE/Right LE/Grossly Intact

## 2022-11-06 NOTE — PHYSICAL THERAPY INITIAL EVALUATION ADULT - ADDITIONAL COMMENTS
Chest X-Ray 11/4/22 Clear lungs.  CT HEAD 11/4/22: Stable exam. No acute intracranial hemorrhage, vasogenic edema, hydrocephalus or extra-axial collection.  CTA BRAIN 11/4/22: Stable exam. Patent intracranial circulation. No flow-limiting stenosis or occlusion. No evidence of aneurysm.  CTA NECK 11/4/22: Stable exam. Patent cervical vasculature. No flow limiting stenosis or occlusion. No evidence of dissection.  MR Head 11/5/22 IMPRESSION: Acute lacunar infarcts suspected as described above.

## 2022-11-06 NOTE — SPEECH LANGUAGE PATHOLOGY EVALUATION - SLP DIAGNOSIS
53yo L-handed man with a PMHx significant for TIA 2010, 2013, February 2022 s/p tpa?, August 2022 s/p tpa, HLD, and a septal defect (on Eliquis) presenting with positional, intermittent dizziness that began 11/3 in the morning. MRI revealed acute lacunar infarcts found in right centrum semiovale and right posterior frontal subcortical region. Pt presents w/ functional expressive and receptive communication skills. No evidence of aphasia or dysarthria noted. Naming, repetition, automatic speech, memory, comprehension, and reasoning are intact. No skilled ST services indicated at this time.

## 2022-11-06 NOTE — CONSULT NOTE ADULT - PROBLEM SELECTOR RECOMMENDATION 9
On Eliquis but evidence of Afib thus far   s/p tPa twice this year   monitor on Tele   consider ILR pending progress and outcome   hypercoagulable workup

## 2022-11-06 NOTE — OCCUPATIONAL THERAPY INITIAL EVALUATION ADULT - PERTINENT HX OF CURRENT PROBLEM, REHAB EVAL
53yo L-handed man with a PMHx significant for TIA 2010, 2013, February 2022 s/p tpa?, August 2022 s/p tpa, HLD, and a septal defect (on Eliquis) presenting with positional, intermittent dizziness that began 11/3 in the morning. Patient says that when he wakes up in the morning, when laying down, he has no room spinning, but when he sits up, he feels like the room starts shifting side to side. Patient says 11/3 AM, dizziness resolved after 1 minute. On 11/4 AM, patient once again stood up from bed, and the dizziness did not go away. Patient feels like his dizziness was exacerbated with working out. Says that he vomited once this AM had some difficulty with his gait. Denies weakness, numbness, vision changes, HA. Says he is compliant with his Eliquis, statin, ASA. Of note, when patient presented last time to [facility] as a code stroke, he had sx of left hand weakness and hypoesthesia and received tpa. Upon review of MRI imaging, no evidence of acute infarct at the time of TIA presentations. MRI significant for mild chronic white matter ischemic changes. No evidence on MRI from August 2022 of prior infarcts.

## 2022-11-06 NOTE — CONSULT NOTE ADULT - ASSESSMENT
53yo L-handed man with a PMHx significant for TIA 2010, 2013, February 2022 s/p tpa?, August 2022 s/p tpa, HLD, and a septal defect (on Eliquis) presenting with positional, intermittent dizziness that began 11/3 in the morning. Patient says that when he wakes up in the morning, when laying down, he has no room spinning, but when he sits up, he feels like the room starts shifting side to side. Patient says 11/3 AM, dizziness resolved after 1 minute. On 11/4 AM, patient once again stood up from bed, and the dizziness did not go away. Patient feels like his dizziness was exacerbated with working out. Says that he vomitted once this AM had some difficulty with his gait. Denies weakness, numbness, vision changes, HA. Says he is compliant with his Eliquis, statin, ASA. Of note, when patient presented last time to [facility] as a code stroke, he had sx of left hand weakness and hypoesthesia and received tpa. Upon review of MRI imaging, no evidence of acute infarct at the time of TIA presentations. MRI significant for mild chronic white matter ischemic changes. No evidence on MRI from August 2022 of prior infarcts.     NIHSS on exam is 0. Upon examining patient, he says his dizziness was better.   has seen Master Perez for PFO closure.   No chest pain, shortness of breath or palpitations.

## 2022-11-06 NOTE — CONSULT NOTE ADULT - SUBJECTIVE AND OBJECTIVE BOX
CHIEF COMPLAINT:    HISTORY OF PRESENT ILLNESS:  51yo L-handed man with a PMHx significant for TIA 2010, 2013, February 2022 s/p tpa?, August 2022 s/p tpa, HLD, and a septal defect (on Eliquis) presenting with positional, intermittent dizziness that began 11/3 in the morning. Patient says that when he wakes up in the morning, when laying down, he has no room spinning, but when he sits up, he feels like the room starts shifting side to side. Patient says 11/3 AM, dizziness resolved after 1 minute. On 11/4 AM, patient once again stood up from bed, and the dizziness did not go away. Patient feels like his dizziness was exacerbated with working out. Says that he vomitted once this AM had some difficulty with his gait. Denies weakness, numbness, vision changes, HA. Says he is compliant with his Eliquis, statin, ASA. Of note, when patient presented last time to [facility] as a code stroke, he had sx of left hand weakness and hypoesthesia and received tpa. Upon review of MRI imaging, no evidence of acute infarct at the time of TIA presentations. MRI significant for mild chronic white matter ischemic changes. No evidence on MRI from August 2022 of prior infarcts.     NIHSS on exam is 0. Upon examining patient, he says his dizziness was better.   has seen Master Perez for PFO closure.   No chest pain, shortness of breath or palpitations.         PAST MEDICAL & SURGICAL HISTORY:  History of TIAs      CVA (cerebrovascular accident)      HLD (hyperlipidemia)      Vertigo      No significant past surgical history              MEDICATIONS:  apixaban 5 milliGRAM(s) Oral two times a day  aspirin  chewable 81 milliGRAM(s) Oral daily    cefTRIAXone   IVPB 1000 milliGRAM(s) IV Intermittent every 24 hours      meclizine 25 milliGRAM(s) Oral two times a day PRN      atorvastatin 80 milliGRAM(s) Oral at bedtime    tamsulosin 0.4 milliGRAM(s) Oral at bedtime      FAMILY HISTORY:  No pertinent family history in first degree relatives        SOCIAL HISTORY:    [ ] Non-smoker  [ ] Smoker  [ ] Alcohol    Allergies    No Known Allergies    Intolerances    	    REVIEW OF SYSTEMS:  CONSTITUTIONAL: No fever, weight loss, + fatigue  EYES: No eye pain, visual disturbances, or discharge  ENMT:  No difficulty hearing, tinnitus, vertigo; No sinus or throat pain  NECK: No pain or stiffness  RESPIRATORY: No cough, wheezing, chills or hemoptysis; No Shortness of Breath  CARDIOVASCULAR: No chest pain, palpitations, passing out, dizziness, or leg swelling  GASTROINTESTINAL: No abdominal or epigastric pain. No nausea, vomiting, or hematemesis; No diarrhea or constipation. No melena or hematochezia.  GENITOURINARY: No dysuria, frequency, hematuria, or incontinence  NEUROLOGICAL: No headaches, memory loss, loss of strength, numbness, or tremors  SKIN: No itching, burning, rashes, or lesions   LYMPH Nodes: No enlarged glands  ENDOCRINE: No heat or cold intolerance; No hair loss  MUSCULOSKELETAL: No joint pain or swelling; No muscle, back, or extremity pain  PSYCHIATRIC: No depression, anxiety, mood swings, or difficulty sleeping  HEME/LYMPH: No easy bruising, or bleeding gums  ALLERY AND IMMUNOLOGIC: No hives or eczema	    [ ] All others negative	  [ ] Unable to obtain    PHYSICAL EXAM:  T(C): 36.6 (11-06-22 @ 04:47), Max: 36.7 (11-05-22 @ 16:15)  HR: 55 (11-06-22 @ 04:47) (55 - 71)  BP: 118/74 (11-06-22 @ 04:47) (118/74 - 129/87)  RR: 18 (11-06-22 @ 04:47) (18 - 18)  SpO2: 96% (11-06-22 @ 04:47) (95% - 98%)  Wt(kg): --  I&O's Summary      Appearance: NAD	  HEENT:  Dry  oral mucosa, PERRL, EOMI	  Lymphatic: No lymphadenopathy  Cardiovascular: Normal S1 S2, No JVD, No murmurs, No edema  Respiratory: Lungs clear to auscultation	  Psychiatry: A & O x 3, Mood & affect appropriate  Gastrointestinal:  Soft, Non-tender, + BS	  Skin: No rashes, No ecchymoses, No cyanosis	  Extremities: Normal range of motion, No clubbing, cyanosis or edema  Vascular: Peripheral pulses palpable 2+ bilaterally  Neurologic:  - Mental Status:  Alert, awake, oriented to person, place, and time; Speech is fluent with intact naming, repetition, and comprehension; Good overall fund of knowledge  - Cranial Nerves II-XII:    II:  Visual fields are full to confrontation; Pupils are equal, round, and reactive to light  III, IV, VI:  Extraocular movements are intact without nystagmus.  V:  Facial sensation is intact in the V1-V3 distribution bilaterally.  VII:  Face is symmetric with normal eye closure and smile  VIII:  Hearing is intact to finger rub.  IX, X:  Uvula is midline and soft palate rises symmetrically  XI:  Head turning and shoulder shrug are intact.  XII:  Tongue protudes in the midline.  - Motor:  Strength is 5/5 throughout.  There is no pronator drift.  Normal muscle bulk and tone throughout.  - Sensory:  Intact throughout to light touch, pin prick.  - Coordination:  Finger-nose-finger without dysmetria.   - Gait:   Normal steps, base, arm swing, and turning.   Romberg testing is negative.  -HINTS exam: no nystagmus, no vertical skew, corrective saccades b/l on head impulse testing    TELEMETRY: SR 	    ECG:  NSR lateral infarct	  RADIOLOGY:      < from: VA Duplex Lower Ext Vein Scan, Bilat (11.05.22 @ 20:01) >    ACC: [id number] EXAM:  DUPLEX SCAN EXT VEINS LOWER BI                          PROCEDURE DATE:  11/05/2022          INTERPRETATION:  CLINICAL INFORMATION: Lower extremity edema    COMPARISON: None available.    TECHNIQUE: Duplex sonography of the BILATERAL LOWER extremity veins with   color and spectral Doppler, with and without compression.    FINDINGS:    RIGHT:  Normal compressibility of the RIGHT common femoral, femoral and popliteal   veins.  Doppler examination shows normal spontaneous and phasic flow.  No RIGHT calf vein thrombosis is detected.    LEFT:  Normal compressibility of the LEFT common femoral, femoral and popliteal   veins.  Doppler examination shows normal spontaneous and phasic flow.  No LEFT calf vein thrombosis is detected.    IMPRESSION:  No evidence of deep venous thrombosis in either lower extremity.    < end of copied text >    < from: MR Head No Cont (11.05.22 @ 10:49) >    ACC: [id number] EXAM:  MR BRAIN                          PROCEDURE DATE:  11/05/2022          INTERPRETATION:  Clinical indication: Vertigo.    MRI of the brain was performed using sagittal T1 axial T1 T2 T2 FLAIR   diffusion and susceptibility weighted sequence.    This exam is compared prior head CT performed on November 4, 2022 and   prior brain MRI performed on August 15, 2022.    Parenchymal volume loss out of proportion to the patient's age is seen.    Subcentimeter foci of T2 prolongation with restricted diffusion is   suspected involving the right centrum semiovale and right posterior   frontal subcortical region. These findings are likely compatible with   acute lacunar infarcts. No hemorrhagic transformation seen. No   significant shift or herniation is seen.    There are no abnormal intra or extra-axial collections    The large vessels demonstrate normal flow voids.    Bilateral ethmoid and frontal sinus mucosal thickening is seen.    Both mastoid and middle ear regions appear clear.    IMPRESSION: Acute lacunar infarcts suspected as described above.    --- End of Report ---      < end of copied text >    < from: CT Head No Cont (11.04.22 @ 20:43) >    ACC: [id number] EXAM:  CT ANGIO NECK (W)AW IC                        ACC: [id number] EXAM:  CT ANGIO BRAIN (W)AW IC                        ACC: [id number] EXAM:  CT BRAIN                          PROCEDURE DATE:  11/04/2022          INTERPRETATION:  HISTORY: Nausea and vertigo, recent transient ischemic   attack.    COMPARISON: Head CT dated 8/4/2022. MRI dated 8/5/2022. CTA head and neck   dated 8/3/2022.    TECHNIQUE: Noncontrast CT head and CT angiogram of the neck and brain was   performed after administration of intravenous contrast. MIP   reconstructions were performed.    Total of 70 cc Omnipaque 300 intravenous contrast were administered   without complication. 30 cc discarded.    FINDINGS:    CT HEAD:    There is no acute intracranial hemorrhage, vasogenic edema midline shift,   or abnormal extra-axial fluid collection. Stable mild chronic   microvascular changes. Stable central pontine calcification.    Ventricles, sulci, and cisterns are stable in size demonstrating   age-appropriate cerebral volume loss without hydrocephalus. Basal   cisterns are clear.    Mild mucosal thickening of the ethmoid air cells and left maxillary   sinus. The mastoid air cells are clear.    Calvarium is intact.    CTA BRAIN:    INTERNAL CAROTID ARTERIES: Minimal atherosclerotic calcification of the   intracranial right internal carotid artery without flow-limiting stenosis   or occlusion. The left intracranial internal carotid artery is patent.   The ICA bifurcations are unremarkable.    ANTERIORCEREBRAL ARTERIES: No flow-limiting stenosis or occlusion.   Anterior communicating artery is unremarkable without aneurysm.    MIDDLE CEREBRAL ARTERIES: No flow-limiting stenosis or occlusion. MCA   bifurcations are unremarkable without aneurysm.    POSTERIOR CEREBRAL ARTERIES: No flow-limiting stenosis or occlusion.   Posterior communicating arteries are not well seen bilaterally.    VERTEBROBASILAR SYSTEM: No flow-limiting stenosis or occlusion.   Hypoplastic appearance of the left V4 segment.Basilar tip is   unremarkable.    CTA NECK:    RIGHT CAROTID SYSTEM: Normal in course and caliber without flow-limiting   stenosis or occlusion.    LEFT CAROTID SYSTEM: Normal in course and caliber without flow-limiting   stenosis or occlusion.    VERTEBRAL SYSTEM:  Normal in course and caliber  without flow-limiting   stenosis or occlusion. Origin of the vertebral arteries are unremarkable.    AORTIC ARCH: Origin of the great vessels are unremarkable.    IMPRESSION:    CT HEAD: Stable exam. No acute intracranial hemorrhage, vasogenic edema,   hydrocephalus or extra-axial collection.    CTA BRAIN: Stable exam. Patent intracranial circulation. No flow-limiting   stenosis or occlusion. No evidence of aneurysm.    CTA NECK: Stable exam. Patent cervical vasculature. No flow limiting   stenosis or occlusion. No evidence of dissection.    NASCET CAROTID STENOSIS CRITERIA (distal normal appearing ICA as   denominator for measurement): 0%-none, 1-49%-mild, 50-70%-moderate,   70-89%-severe, 90-99%-critical.    --- End of Report ---    < end of copied text >    OTHER: 	  	  LABS:	 	    CARDIAC MARKERS:    A1C with Estimated Average Glucose Result: 5.6: Method: Immunoassay                               13.4   5.66  )-----------( 181      ( 06 Nov 2022 07:56 )             41.0     11-04    142  |  105  |  10  ----------------------------<  98  3.4<L>   |  26  |  0.87    Ca    8.9      04 Nov 2022 20:34    TPro  7.3  /  Alb  4.2  /  TBili  0.6  /  DBili  x   /  AST  21  /  ALT  28  /  AlkPhos  74  11-04    proBNP:   Lipid Profile:   HgA1c:   TSH:

## 2022-11-06 NOTE — SPEECH LANGUAGE PATHOLOGY EVALUATION - COMMENTS
IMAGIN/4 CXR: IMPRESSION: Clear lungs.   MRI head: IMPRESSION: Acute lacunar infarcts involving the right centrum semiovale and right posterior frontal subcortical region.     ***Pt is previously known to this service from previous hospitalizations. Most recently seen at [facility] 2022 s/p CVA and noted w/ a functional oral/pharyngeal swallow. Pt denies voice changes; Pt speaks clearly at an adequate vocal volume

## 2022-11-06 NOTE — PHYSICAL THERAPY INITIAL EVALUATION ADULT - LIVES WITH, PROFILE
Pt resides alone in apartment with 10 steps to enter with HR assist and first floor setup. Pt reports being functionally independent in all aspects of mobility and self care prior without AD. (+) works and drives/alone

## 2022-11-07 ENCOUNTER — TRANSCRIPTION ENCOUNTER (OUTPATIENT)
Age: 52
End: 2022-11-07

## 2022-11-07 VITALS
RESPIRATION RATE: 18 BRPM | DIASTOLIC BLOOD PRESSURE: 82 MMHG | TEMPERATURE: 98 F | SYSTOLIC BLOOD PRESSURE: 126 MMHG | OXYGEN SATURATION: 96 %

## 2022-11-07 LAB
ANION GAP SERPL CALC-SCNC: 12 MMOL/L — SIGNIFICANT CHANGE UP (ref 5–17)
AT III ACT/NOR PPP CHRO: 109 % — SIGNIFICANT CHANGE UP (ref 85–135)
B2 GLYCOPROT1 AB SER QL: NEGATIVE — SIGNIFICANT CHANGE UP
BUN SERPL-MCNC: 16 MG/DL — SIGNIFICANT CHANGE UP (ref 7–23)
C3 SERPL-MCNC: 130 MG/DL — SIGNIFICANT CHANGE UP (ref 81–157)
C4 SERPL-MCNC: 31 MG/DL — SIGNIFICANT CHANGE UP (ref 13–39)
CALCIUM SERPL-MCNC: 8.8 MG/DL — SIGNIFICANT CHANGE UP (ref 8.4–10.5)
CARDIOLIPIN AB SER-ACNC: NEGATIVE — SIGNIFICANT CHANGE UP
CHLORIDE SERPL-SCNC: 105 MMOL/L — SIGNIFICANT CHANGE UP (ref 96–108)
CO2 SERPL-SCNC: 22 MMOL/L — SIGNIFICANT CHANGE UP (ref 22–31)
CREAT SERPL-MCNC: 0.95 MG/DL — SIGNIFICANT CHANGE UP (ref 0.5–1.3)
CRP SERPL-MCNC: <3 MG/L — SIGNIFICANT CHANGE UP (ref 0–4)
CULTURE RESULTS: SIGNIFICANT CHANGE UP
EGFR: 96 ML/MIN/1.73M2 — SIGNIFICANT CHANGE UP
ERYTHROCYTE [SEDIMENTATION RATE] IN BLOOD: 6 MM/HR — SIGNIFICANT CHANGE UP (ref 0–20)
GLUCOSE SERPL-MCNC: 97 MG/DL — SIGNIFICANT CHANGE UP (ref 70–99)
HCT VFR BLD CALC: 41.1 % — SIGNIFICANT CHANGE UP (ref 39–50)
HGB BLD-MCNC: 13.6 G/DL — SIGNIFICANT CHANGE UP (ref 13–17)
MCHC RBC-ENTMCNC: 29.3 PG — SIGNIFICANT CHANGE UP (ref 27–34)
MCHC RBC-ENTMCNC: 33.1 GM/DL — SIGNIFICANT CHANGE UP (ref 32–36)
MCV RBC AUTO: 88.6 FL — SIGNIFICANT CHANGE UP (ref 80–100)
NRBC # BLD: 0 /100 WBCS — SIGNIFICANT CHANGE UP (ref 0–0)
PLATELET # BLD AUTO: 167 K/UL — SIGNIFICANT CHANGE UP (ref 150–400)
POTASSIUM SERPL-MCNC: 4 MMOL/L — SIGNIFICANT CHANGE UP (ref 3.5–5.3)
POTASSIUM SERPL-SCNC: 4 MMOL/L — SIGNIFICANT CHANGE UP (ref 3.5–5.3)
PROT C ACT/NOR PPP: 101 % — SIGNIFICANT CHANGE UP (ref 74–150)
RBC # BLD: 4.64 M/UL — SIGNIFICANT CHANGE UP (ref 4.2–5.8)
RBC # FLD: 13.2 % — SIGNIFICANT CHANGE UP (ref 10.3–14.5)
RHEUMATOID FACT SERPL-ACNC: <10 IU/ML — SIGNIFICANT CHANGE UP (ref 0–13)
SODIUM SERPL-SCNC: 139 MMOL/L — SIGNIFICANT CHANGE UP (ref 135–145)
SPECIMEN SOURCE: SIGNIFICANT CHANGE UP
WBC # BLD: 6.35 K/UL — SIGNIFICANT CHANGE UP (ref 3.8–10.5)
WBC # FLD AUTO: 6.35 K/UL — SIGNIFICANT CHANGE UP (ref 3.8–10.5)

## 2022-11-07 PROCEDURE — 83036 HEMOGLOBIN GLYCOSYLATED A1C: CPT

## 2022-11-07 PROCEDURE — 97161 PT EVAL LOW COMPLEX 20 MIN: CPT

## 2022-11-07 PROCEDURE — 85306 CLOT INHIBIT PROT S FREE: CPT

## 2022-11-07 PROCEDURE — 85303 CLOT INHIBIT PROT C ACTIVITY: CPT

## 2022-11-07 PROCEDURE — 80048 BASIC METABOLIC PNL TOTAL CA: CPT

## 2022-11-07 PROCEDURE — 81240 F2 GENE: CPT

## 2022-11-07 PROCEDURE — 80053 COMPREHEN METABOLIC PANEL: CPT

## 2022-11-07 PROCEDURE — 85300 ANTITHROMBIN III ACTIVITY: CPT

## 2022-11-07 PROCEDURE — 82164 ANGIOTENSIN I ENZYME TEST: CPT

## 2022-11-07 PROCEDURE — 87086 URINE CULTURE/COLONY COUNT: CPT

## 2022-11-07 PROCEDURE — 86225 DNA ANTIBODY NATIVE: CPT

## 2022-11-07 PROCEDURE — 71260 CT THORAX DX C+: CPT

## 2022-11-07 PROCEDURE — 70496 CT ANGIOGRAPHY HEAD: CPT | Mod: MA

## 2022-11-07 PROCEDURE — 96375 TX/PRO/DX INJ NEW DRUG ADDON: CPT

## 2022-11-07 PROCEDURE — 70498 CT ANGIOGRAPHY NECK: CPT | Mod: MA

## 2022-11-07 PROCEDURE — 70551 MRI BRAIN STEM W/O DYE: CPT | Mod: MG

## 2022-11-07 PROCEDURE — 85307 ASSAY ACTIVATED PROTEIN C: CPT

## 2022-11-07 PROCEDURE — 93306 TTE W/DOPPLER COMPLETE: CPT

## 2022-11-07 PROCEDURE — 81001 URINALYSIS AUTO W/SCOPE: CPT

## 2022-11-07 PROCEDURE — 86140 C-REACTIVE PROTEIN: CPT

## 2022-11-07 PROCEDURE — 71045 X-RAY EXAM CHEST 1 VIEW: CPT

## 2022-11-07 PROCEDURE — 80061 LIPID PANEL: CPT

## 2022-11-07 PROCEDURE — 86235 NUCLEAR ANTIGEN ANTIBODY: CPT

## 2022-11-07 PROCEDURE — 86147 CARDIOLIPIN ANTIBODY EA IG: CPT

## 2022-11-07 PROCEDURE — 92523 SPEECH SOUND LANG COMPREHEN: CPT

## 2022-11-07 PROCEDURE — 83090 ASSAY OF HOMOCYSTEINE: CPT

## 2022-11-07 PROCEDURE — 86431 RHEUMATOID FACTOR QUANT: CPT

## 2022-11-07 PROCEDURE — U0003: CPT

## 2022-11-07 PROCEDURE — 85027 COMPLETE CBC AUTOMATED: CPT

## 2022-11-07 PROCEDURE — 83735 ASSAY OF MAGNESIUM: CPT

## 2022-11-07 PROCEDURE — 81241 F5 GENE: CPT

## 2022-11-07 PROCEDURE — 97165 OT EVAL LOW COMPLEX 30 MIN: CPT

## 2022-11-07 PROCEDURE — 81291 MTHFR GENE: CPT

## 2022-11-07 PROCEDURE — 86038 ANTINUCLEAR ANTIBODIES: CPT

## 2022-11-07 PROCEDURE — 99285 EMERGENCY DEPT VISIT HI MDM: CPT | Mod: 25

## 2022-11-07 PROCEDURE — 96374 THER/PROPH/DIAG INJ IV PUSH: CPT

## 2022-11-07 PROCEDURE — 74177 CT ABD & PELVIS W/CONTRAST: CPT

## 2022-11-07 PROCEDURE — 99223 1ST HOSP IP/OBS HIGH 75: CPT

## 2022-11-07 PROCEDURE — 36415 COLL VENOUS BLD VENIPUNCTURE: CPT

## 2022-11-07 PROCEDURE — 86160 COMPLEMENT ANTIGEN: CPT

## 2022-11-07 PROCEDURE — G1004: CPT

## 2022-11-07 PROCEDURE — U0005: CPT

## 2022-11-07 PROCEDURE — 99222 1ST HOSP IP/OBS MODERATE 55: CPT

## 2022-11-07 PROCEDURE — 93970 EXTREMITY STUDY: CPT

## 2022-11-07 PROCEDURE — 70450 CT HEAD/BRAIN W/O DYE: CPT | Mod: QQ

## 2022-11-07 PROCEDURE — G0378: CPT

## 2022-11-07 PROCEDURE — 86146 BETA-2 GLYCOPROTEIN ANTIBODY: CPT

## 2022-11-07 PROCEDURE — 85025 COMPLETE CBC W/AUTO DIFF WBC: CPT

## 2022-11-07 PROCEDURE — 76377 3D RENDER W/INTRP POSTPROCES: CPT

## 2022-11-07 PROCEDURE — 85652 RBC SED RATE AUTOMATED: CPT

## 2022-11-07 RX ORDER — DEXTROAMPHETAMINE SACCHARATE, AMPHETAMINE ASPARTATE, DEXTROAMPHETAMINE SULFATE AND AMPHETAMINE SULFATE 1.875; 1.875; 1.875; 1.875 MG/1; MG/1; MG/1; MG/1
0 TABLET ORAL
Qty: 0 | Refills: 0 | DISCHARGE

## 2022-11-07 RX ORDER — TAMSULOSIN HYDROCHLORIDE 0.4 MG/1
0 CAPSULE ORAL
Qty: 0 | Refills: 0 | DISCHARGE

## 2022-11-07 RX ORDER — TAMSULOSIN HYDROCHLORIDE 0.4 MG/1
1 CAPSULE ORAL
Qty: 0 | Refills: 0 | DISCHARGE
Start: 2022-11-07

## 2022-11-07 RX ORDER — CEFPODOXIME PROXETIL 100 MG
100 TABLET ORAL EVERY 12 HOURS
Refills: 0 | Status: DISCONTINUED | OUTPATIENT
Start: 2022-11-07 | End: 2022-11-07

## 2022-11-07 RX ORDER — CEFPODOXIME PROXETIL 100 MG
1 TABLET ORAL
Qty: 12 | Refills: 0
Start: 2022-11-07 | End: 2022-11-12

## 2022-11-07 RX ADMIN — Medication 81 MILLIGRAM(S): at 12:04

## 2022-11-07 RX ADMIN — Medication 100 MILLIGRAM(S): at 12:05

## 2022-11-07 RX ADMIN — APIXABAN 5 MILLIGRAM(S): 2.5 TABLET, FILM COATED ORAL at 04:57

## 2022-11-07 NOTE — PROGRESS NOTE ADULT - ASSESSMENT
Patient is a 53 Y/O L-handed man with PMHx significant for TIA 2010, 2013, February 2022 s/p tpa?, August 2022 s/p tpa, HLD, and a septal defect (on Eliquis) presenting with positional, intermittent dizziness that began 11/3 in the morning. Patient says that when he wakes up in the morning, when laying down, he has no room spinning, but when he sits up, he feels like the room starts shifting side to side. Patient says 11/3 AM, dizziness resolved after 1 minute. On 11/4 AM, patient once again stood up from bed, and the dizziness did not go away. Patient feels like his dizziness was exacerbated with working out. Says that he vomited once this AM had some difficulty with his gait. Denies weakness, numbness, vision changes, HA. Says he is compliant with his Eliquis, statin, ASA. Of note, when patient presented last time to [facility] as a code stroke, he had sx of left hand weakness and hypoesthesia and received tpa. Upon review of MRI imaging, no evidence of acute infarct at the time of TIA presentations. MRI significant for mild chronic white matter ischemic changes. No evidence on MRI from August 2022 of prior infarcts.     # Acute lacunar infarcts  Care asper neurology   MRI and CT noted   Eliquis, ASA and statin  TTE as above with EF of 57%, Normal LVS Function, Minimal TR, No Segmental WMA  tele monitoring  hypercoag W/U, [place] CT    speech and swallow eval   PT/OT  ILR to r/o occult arrythmia  Hypercoag work up   CT w/ Pulmonary nodules. Discussed with patient to follow up with his PCP for monitoring and repeat imaging as an outpatient. Patient states that he will follow up with PCP and Pulm as an outpatient     #  Septal defect/ PFO  Check Echo   on eliquis   - Structural heart eval per general cardio; Outpatient follow up with Dr Lynn for PFO closure       # UTI  positive UA  awaiting urine Cx --> Ordered, patient is on Rocephin, results may be incorrect   cont rocephin, total of 7 days treatment  can switch to oral after sensitivity --DC on Cefpodaxamine 100 BID to complete course of ABX     DVt and GI ppx    Patient is a 53 Y/O L-handed man with PMHx significant for TIA 2010, 2013, February 2022 s/p tpa?, August 2022 s/p tpa, HLD, and a septal defect (on Eliquis) presenting with positional, intermittent dizziness that began 11/3 in the morning. Patient says that when he wakes up in the morning, when laying down, he has no room spinning, but when he sits up, he feels like the room starts shifting side to side. Patient says 11/3 AM, dizziness resolved after 1 minute. On 11/4 AM, patient once again stood up from bed, and the dizziness did not go away. Patient feels like his dizziness was exacerbated with working out. Says that he vomited once this AM had some difficulty with his gait. Denies weakness, numbness, vision changes, HA. Says he is compliant with his Eliquis, statin, ASA. Of note, when patient presented last time to [facility] as a code stroke, he had sx of left hand weakness and hypoesthesia and received tpa. Upon review of MRI imaging, no evidence of acute infarct at the time of TIA presentations. MRI significant for mild chronic white matter ischemic changes. No evidence on MRI from August 2022 of prior infarcts.     # Acute lacunar infarcts  Care asper neurology   MRI and CT noted   Eliquis, ASA and statin  TTE as above with EF of 57%, Normal LVS Function, Minimal TR, No Segmental WMA  tele monitoring  hypercoag W/U, [place] CT    speech and swallow eval   PT/OT  ILR to r/o occult arrythmia  Hypercoag work up   CT w/ Pulmonary nodules. Discussed with patient to follow up with his PCP for monitoring and repeat imaging as an outpatient. Patient states that he will follow up with PCP and Pulm as an outpatient     #  Septal defect/ PFO  Check Echo   on eliquis   - Structural heart eval per general cardio; Outpatient follow up with Dr Lynn for PFO closure       # UTI  positive UA  awaiting urine Cx --> Ordered, patient is on Rocephin, results may be incorrect   cont rocephin, total of 7 days treatment  can switch to oral after sensitivity --DC on Cefpodaxamine 100 BID to complete course of ABX     DVt and GI ppx   Outpatient follow up for hepatic cyst and pulmonary nodules

## 2022-11-07 NOTE — CONSULT NOTE ADULT - SUBJECTIVE AND OBJECTIVE BOX
HPI:  Per chart documentation 11/4:  53yo L-handed man with a PMHx significant for TIA 2010, 2013, February 2022 s/p tpa?, August 2022 s/p tpa, HLD, and a septal defect (on Eliquis) presenting with positional, intermittent dizziness that began 11/3 in the morning. Patient says that when he wakes up in the morning, when laying down, he has no room spinning, but when he sits up, he feels like the room starts shifting side to side. Patient says 11/3 AM, dizziness resolved after 1 minute. On 11/4 AM, patient once again stood up from bed, and the dizziness did not go away. Patient feels like his dizziness was exacerbated with working out. Says that he vomited once this AM had some difficulty with his gait. Denies weakness, numbness, vision changes, HA. Says he is compliant with his Eliquis, statin, ASA. Of note, when patient presented last time to [facility] as a code stroke, he had sx of left hand weakness and hypoesthesia and received tpa. Upon review of MRI imaging, no evidence of acute infarct at the time of TIA presentations. MRI significant for mild chronic white matter ischemic changes. No evidence on MRI from August 2022 of prior infarcts.     NIHSS on exam is 0. Upon examining patient, he says his dizziness was better.     Patient was admitted on 11/5, MRI with acute lacunar infarcts, seen earlier today, denies pain, weakness.     REVIEW OF SYSTEMS  Constitutional - No fever, No weight loss, No fatigue  HEENT - No eye pain, No visual disturbances, No difficulty hearing, No tinnitus, No vertigo, No neck pain  Respiratory - No cough, No wheezing, No shortness of breath  Cardiovascular - No chest pain, No palpitations  Gastrointestinal - No abdominal pain, No nausea, No vomiting, No diarrhea, No constipation  Genitourinary - No dysuria, No frequency, No hematuria, No incontinence  Psychiatric - No depression, No anxiety    VITALS  T(C): 36.7 (11-07-22 @ 09:28), Max: 36.8 (11-06-22 @ 14:03)  HR: 68 (11-07-22 @ 04:43) (57 - 68)  BP: 126/82 (11-07-22 @ 09:28) (102/68 - 128/84)  RR: 18 (11-07-22 @ 09:28) (18 - 18)  SpO2: 96% (11-07-22 @ 09:28) (94% - 97%)  Wt(kg): --    PAST MEDICAL & SURGICAL HISTORY  History of TIAs    CVA (cerebrovascular accident)    HLD (hyperlipidemia)    Vertigo    No significant past surgical history        SOCIAL HISTORY  Smoking - Denied  EtOH - Denied   Drugs - Denied    FUNCTIONAL HISTORY  Lives alone, apt, 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 No evidence of aphasia or dysarthria noted    11/6 PT, OT   independent with functional mobility, bed mobility, transfers, gait x 125 feet with no AD        FAMILY HISTORY   No pertinent family history in first degree relatives      RECENT LABS/IMAGING  CBC Full  -  ( 07 Nov 2022 05:45 )  WBC Count : 6.35 K/uL  RBC Count : 4.64 M/uL  Hemoglobin : 13.6 g/dL  Hematocrit : 41.1 %  Platelet Count - Automated : 167 K/uL  Mean Cell Volume : 88.6 fl  Mean Cell Hemoglobin : 29.3 pg  Mean Cell Hemoglobin Concentration : 33.1 gm/dL  Auto Neutrophil # : x  Auto Lymphocyte # : x  Auto Monocyte # : x  Auto Eosinophil # : x  Auto Basophil # : x  Auto Neutrophil % : x  Auto Lymphocyte % : x  Auto Monocyte % : x  Auto Eosinophil % : x  Auto Basophil % : x    11-07    139  |  105  |  16  ----------------------------<  97  4.0   |  22  |  0.95    Ca    8.8      07 Nov 2022 05:44  Mg     2.2     11-06      < from: MR Head No Cont (11.05.22 @ 10:49) >    INTERPRETATION:  Clinical indication: Vertigo.    MRI of the brain was performed using sagittal T1 axial T1 T2 T2 FLAIR   diffusion and susceptibility weighted sequence.    This exam is compared prior head CT performed on November 4, 2022 and   prior brain MRI performed on August 15, 2022.    Parenchymal volume loss out of proportion to the patient's age is seen.    Subcentimeter foci of T2 prolongation with restricted diffusion is   suspected involving the right centrum semiovale and right posterior   frontal subcortical region. These findings are likely compatible with   acute lacunar infarcts. No hemorrhagic transformation seen. No   significant shift or herniation is seen.    There are no abnormal intra or extra-axial collections    The large vessels demonstrate normal flow voids.    Bilateral ethmoid and frontal sinus mucosal thickening is seen.    Both mastoid and middle ear regions appear clear.    IMPRESSION: Acute lacunar infarcts suspected as described above.      < end of copied text >      ALLERGIES  No Known Allergies      MEDICATIONS   apixaban 5 milliGRAM(s) Oral two times a day  aspirin  chewable 81 milliGRAM(s) Oral daily  atorvastatin 80 milliGRAM(s) Oral at bedtime  cefpodoxime 100 milliGRAM(s) Oral every 12 hours  meclizine 25 milliGRAM(s) Oral two times a day PRN  tamsulosin 0.4 milliGRAM(s) Oral at bedtime      ----------------------------------------------------------------------------------------  PHYSICAL EXAM  Constitutional - NAD, Comfortable, laying in bed   Chest - Breathing comfortably  Cardiovascular - S1S2   Abdomen - Soft   Extremities - No C/C/E, No calf tenderness   Neurologic Exam -       follows commands              Cognitive - Awake, Alert, AAO to self, place, date, year, situation     Communication - Fluent, No dysarthria        Motor - No focal deficits                    LEFT    UE - ShAB 5/5, EF 5/5, EE 5/5, WE 5/5,  5/5                    RIGHT UE - ShAB 5/5, EF 5/5, EE 5/5, WE 5/5,  5/5                    LEFT    LE - HF 5/5, KE 5/5, DF 5/5, PF 5/5                    RIGHT LE - HF 5/5, KE 5/5, DF 5/5, PF 5/5        Sensory - Intact to LT     Psychiatric - Mood stable, Affect WNL  ----------------------------------------------------------------------------------------  ASSESSMENT/PLAN  52yMale h/o TIA/CVA admitted with dizziness, MRI with acute lacunar infarcts  Pain - Tylenol  DVT PPX - SCDs, eliquis   Rehab - Will continue to follow for ongoing rehab needs and recommendations.    patient is independent on PT/OT evaluations, can be discharged home with outpatient follow up

## 2022-11-07 NOTE — DISCHARGE NOTE PROVIDER - NSDCMRMEDTOKEN_GEN_ALL_CORE_FT
Adderall:   apixaban 5 mg oral tablet: 1 tab(s) orally every 12 hours  aspirin 81 mg oral delayed release tablet: 1 tab(s) orally once a day  atorvastatin 80 mg oral tablet: 0.5 tab(s) orally once a day (at bedtime)  Flomax:   testosterone:    apixaban 5 mg oral tablet: 1 tab(s) orally every 12 hours  aspirin 81 mg oral delayed release tablet: 1 tab(s) orally once a day  atorvastatin 80 mg oral tablet: 0.5 tab(s) orally once a day (at bedtime)  cefpodoxime 100 mg oral tablet: 1 tab(s) orally every 12 hours  tamsulosin 0.4 mg oral capsule: 1 cap(s) orally once a day (at bedtime)

## 2022-11-07 NOTE — DISCHARGE NOTE PROVIDER - NSDCCPCAREPLAN_GEN_ALL_CORE_FT
PRINCIPAL DISCHARGE DIAGNOSIS  Diagnosis: Stroke  Assessment and Plan of Treatment: Please follow up with neurologist in 1 week. The office will call you to schedule an appointment, if you do not hear from them please call [phone]. Continue taking medications as prescribed. If you were prescribed a statin for your cholesterol please make sure you have your liver enzymes checked with your primary care physician. Monitor your blood pressure. Reduce fat, cholesterol and salt in your diet. Increase intake of fruits and vegetables. Limit alcohol to minimum and do not smoke. You may be at risk for falling, make changes to your home to help you walk easier. Keep up to date on vaccinations.  If you experience any symptoms of facial drooping, slurred speech, arm or leg weakness, severe headache, vision changes or any worsening symptoms, notify provider immediately and return to ER.        SECONDARY DISCHARGE DIAGNOSES  Diagnosis: Acute UTI  Assessment and Plan of Treatment: will need to follow with urology as outpatient.

## 2022-11-07 NOTE — DISCHARGE NOTE PROVIDER - PROVIDER TOKENS
PROVIDER:[TOKEN:[[id number]],FOLLOWUP:[1 week]],PROVIDER:[TOKEN:[[id number]],FOLLOWUP:[1 week]],PROVIDER:[TOKEN:[[id number]],FOLLOWUP:[2 weeks]] PROVIDER:[TOKEN:[[id number]],FOLLOWUP:[1 week]],PROVIDER:[TOKEN:[[id number]],FOLLOWUP:[1 week]],PROVIDER:[TOKEN:[[id number]],FOLLOWUP:[2 weeks]],PROVIDER:[TOKEN:[[id number]],FOLLOWUP:[1 month]]

## 2022-11-07 NOTE — DISCHARGE NOTE PROVIDER - NSDCFUSCHEDAPPT_GEN_ALL_CORE_FT
Master Lynn Physician [facility]  CARDIOLOGY [address]  Scheduled Appointment: 12/01/2022

## 2022-11-07 NOTE — CONSULT NOTE ADULT - SUBJECTIVE AND OBJECTIVE BOX
PAST MEDICAL & SURGICAL HISTORY:  History of TIAs  CVA (cerebrovascular accident)  HLD (hyperlipidemia)  PFO  Vertigo  No significant past surgical history    T(C): 36.7 (11-07-22 @ 09:28), Max: 36.8 (11-06-22 @ 21:06)  HR: 68 (11-07-22 @ 04:43) (57 - 68)  BP: 126/82 (11-07-22 @ 09:28) (116/76 - 128/84)  RR: 18 (11-07-22 @ 09:28) (18 - 18)  SpO2: 96% (11-07-22 @ 09:28) (94% - 96%)  Wt(kg): --  11-06 @ 07:01  -  11-07 @ 07:00  --------------------------------------------------------  IN: 480 mL / OUT: 0 mL / NET: 480 mL    MEDICATIONS  (STANDING):  apixaban 5 milliGRAM(s) Oral two times a day  aspirin  chewable 81 milliGRAM(s) Oral daily  atorvastatin 80 milliGRAM(s) Oral at bedtime  cefpodoxime 100 milliGRAM(s) Oral every 12 hours  tamsulosin 0.4 milliGRAM(s) Oral at bedtime    MEDICATIONS  (PRN):  meclizine 25 milliGRAM(s) Oral two times a day PRN Dizziness    Home Medications:  apixaban 5 mg oral tablet: 1 tab(s) orally every 12 hours (05 Nov 2022 00:55)  aspirin 81 mg oral delayed release tablet: 1 tab(s) orally once a day (05 Nov 2022 00:55)  atorvastatin 80 mg oral tablet: 0.5 tab(s) orally once a day (at bedtime) (05 Nov 2022 13:15)  tamsulosin 0.4 mg oral capsule: 1 cap(s) orally once a day (at bedtime) (07 Nov 2022 09:10)                        13.6   6.35  )-----------( 167      ( 07 Nov 2022 05:45 )             41.1   11-07    139  |  105  |  16  ----------------------------<  97  4.0   |  22  |  0.95    Ca    8.8      07 Nov 2022 05:44  Mg     2.2     11-06

## 2022-11-07 NOTE — PROGRESS NOTE ADULT - ASSESSMENT
51yo L-handed man with a PMHx significant for TIA 2010, 2013, February 2022 s/p tpa?, August 2022 s/p tpa, HLD, and a septal defect (on Eliquis) presenting with positional, intermittent dizziness that began 11/3 in the morning. Patient says that when he wakes up in the morning, when laying down, he has no room spinning, but when he sits up, he feels like the room starts shifting side to side. Patient says 11/3 AM, dizziness resolved after 1 minute. On 11/4 AM, patient once again stood up from bed, and the dizziness did not go away. Patient feels like his dizziness was exacerbated with working out. Says that he vomited once this AM had some difficulty with his gait. Denies weakness, numbness, vision changes, HA. Says he is compliant with his Eliquis, statin, ASA. Of note, when patient presented last time to [facility] as a code stroke, he had sx of left hand weakness and hypoesthesia and received tpa. Upon review of MRI imaging, no evidence of acute infarct at the time of TIA presentations. MRI significant for mild chronic white matter ischemic changes. No evidence on MRI from August 2022 of prior infarcts.     IMPRESSION:  Gait was unremarkable, no dysmetria. MRI obtained to r/o central etiology iso multiple vascular risk factors: Acute lacunar infarcts found in right centrum semiovale and right posterior frontal subcortical region.    NEURO: Neurologically stable since admission, will continue to monitor for neurologic deterioration, permissive HTN to gradual normotension, titrate statin to LDL goal less than 70, MRI Brain w/o, results as noted above. Physical therapy/OT recommending home with no skilled needs.     ANTITHROMBOTIC THERAPY: Eliquis and aspirin    PULMONARY: CXR clear on 11/4, protecting airway, saturating well     CARDIOVASCULAR:  TTE shows Ef of 57%, Normal left ventricular internal dimensions and wall thicknesses,  Normal left ventricular systolic function. No segmental wall motion abnormalities, Normal diastolic function, Normal right ventricular size and function.  Prior MIMI at [facility] with PFO, Dr. Miles following no need for repeat MIMI, will plan for PFO closure as outpatient.  Continue with cardiac monitoring.                              SBP goal: Permissive to gradual normotension     GASTROINTESTINAL:  dysphagia screen , passed, on regular diet, tolerating.      Diet: Regular, DASH     RENAL: BUN/Cr stable, good urine output      Na Goal: Greater than 135     Arana: No    HEMATOLOGY: H/H stable, Platelets 167     DVT ppx: Eliquis     ID: afebrile, no leukocytosis     OTHER: All questions addressed with patient at bedside.     DISPOSITION: Home once stable and workup is complete.    CORE MEASURES:        Admission NIHSS: 0     TPA: [] YES [x] NO      LDL/HDL: 74/34     Depression Screen: P     Statin Therapy: y     Dysphagia Screen: [x] PASS [] FAIL     Smoking [] YES [x] NO      Afib [] YES [x] NO     Stroke Education [x] YES [] NO   51yo L-handed man with a PMHx significant for TIA 2010, 2013, February 2022 s/p tpa?, August 2022 s/p tpa, HLD, and a septal defect (on Eliquis) presenting with positional, intermittent dizziness that began 11/3 in the morning. Patient says that when he wakes up in the morning, when laying down, he has no room spinning, but when he sits up, he feels like the room starts shifting side to side. Patient says 11/3 AM, dizziness resolved after 1 minute. On 11/4 AM, patient once again stood up from bed, and the dizziness did not go away. Patient feels like his dizziness was exacerbated with working out. Says that he vomited once this AM had some difficulty with his gait. Denies weakness, numbness, vision changes, HA. Says he is compliant with his Eliquis, statin, ASA. Of note, when patient presented last time to [facility] as a code stroke, he had sx of left hand weakness and hypoesthesia and received tpa. Upon review of MRI imaging, no evidence of acute infarct at the time of TIA presentations. MRI significant for mild chronic white matter ischemic changes. No evidence on MRI from August 2022 of prior infarcts.     IMPRESSION:  Gait was unremarkable, no dysmetria. MRI obtained to r/o central etiology iso multiple vascular risk factors: Acute lacunar infarcts found in right centrum semiovale and right posterior frontal subcortical region.    NEURO: Neurologically stable since admission, will continue to monitor for neurologic deterioration, permissive HTN to gradual normotension, titrate statin to LDL goal less than 70, MRI Brain w/o, results as noted above. Physical therapy/OT recommending home with no skilled needs.     ANTITHROMBOTIC THERAPY: Eliquis and aspirin    PULMONARY: CXR clear on 11/4, protecting airway, saturating well     CARDIOVASCULAR:  TTE shows Ef of 57%, Normal left ventricular internal dimensions and wall thicknesses,  Normal left ventricular systolic function. No segmental wall motion abnormalities, Normal diastolic function, Normal right ventricular size and function.  Prior MIMI at [facility] with PFO, Dr. Miles following no need for repeat MIMI, will plan for PFO closure as outpatient.  Continue with cardiac monitoring.                              SBP goal: Permissive to gradual normotension     GASTROINTESTINAL:  dysphagia screen , passed, on regular diet, tolerating.      Diet: Regular, DASH     RENAL: BUN/Cr stable, good urine output      Na Goal: Greater than 135     Arana: No    HEMATOLOGY: H/H stable, Platelets 167     DVT ppx: Eliquis     ID: afebrile, no leukocytosis     OTHER: All questions addressed with patient at bedside.     DISPOSITION: Home once stable and workup is complete.    CORE MEASURES:        Admission NIHSS: 0     TPA: [] YES [x] NO      LDL/HDL: 74/34     Depression Screen: P     Statin Therapy: y     Dysphagia Screen: [x] PASS [] FAIL     Smoking [] YES [x] NO      Afib [] YES [x] NO     Stroke Education [x] YES [] NO    Obtain screening lower extremity venous ultrasound in patients who meet 1 or more of the following criteria as patient is high risk for DVT/PE on admission:   [] History of DVT/PE  []Hypercoagulable states (Factor V Leiden, Cancer, OCP, etc. )  []Prolonged immobility (hemiplegia/hemiparesis/post operative or any other extended immobilization)  [] Transferred from outside facility (Rehab or Long term care)  [] Age </= to 50

## 2022-11-07 NOTE — DISCHARGE NOTE NURSING/CASE MANAGEMENT/SOCIAL WORK - NSDCPEFALRISK_GEN_ALL_CORE
For information on Fall & Injury Prevention, visit: [email or web address] OR  [email or web address] OR  https://www.cdc.gov/steadi/patient.html

## 2022-11-07 NOTE — DISCHARGE NOTE PROVIDER - CARE PROVIDER_API CALL
Braxton Forde)  Neurology; Vascular Neurology  [address], [address]  [place] [address]  Phone: [phone]  Fax: [phone]  Follow Up Time: 1 week    Master Lynn)  Cardiology; Internal Medicine; Interventional Cardiology  [address] [place] [address]  Phone: [phone]  Fax: [phone]  Follow Up Time: 1 week    Hema Muro)  Urology  [address] [place] [address]  Phone: [phone]  Fax: [phone]  Follow Up Time: 2 weeks   Braxton Forde)  Neurology; Vascular Neurology  [address] [address], [address]  [place] [address]  Phone: [phone]  Fax: [phone]  Follow Up Time: 1 week    Master Lynn)  Cardiology; Internal Medicine; Interventional Cardiology  [address] [place] [address]  Phone: [phone]  Fax: [phone]  Follow Up Time: 1 week    Hema Muro)  Urology  [address] [place] [address]  Phone: [phone]  Fax: [phone]  Follow Up Time: 2 weeks    Wade Johnson)  Critical Care Medicine  [address] [facility] [address]  [place] [address]  Phone: [phone]  Fax: [phone]  Follow Up Time: 1 month

## 2022-11-07 NOTE — DISCHARGE NOTE NURSING/CASE MANAGEMENT/SOCIAL WORK - PATIENT PORTAL LINK FT
You can access the FollowMyHealth Patient Portal offered by [facility] by registering at the following website: [email or web address]. By joining [employer]’s FollowMyHealth portal, you will also be able to view your health information using other applications (apps) compatible with our system.

## 2022-11-07 NOTE — PROGRESS NOTE ADULT - PROBLEM SELECTOR PLAN 1
Acute lacunar infarcts found in right centrum semiovale and right posterior frontal subcortical region  eliquis/asa/statin   TTE - EF 57%, normal   monitor on tele   neuro checks  management as per Stroke Team

## 2022-11-07 NOTE — PROGRESS NOTE ADULT - SUBJECTIVE AND OBJECTIVE BOX
THE PATIENT WAS SEEN AND EXAMINED BY ME WITH THE HOUSESTAFF AND STROKE TEAM DURING MORNING ROUNDS.   HPI:51yo L-handed man with a PMHx significant for TIA 2010, 2013, February 2022 s/p tpa?, August 2022 s/p tpa, HLD, and a septal defect (on Eliquis) presenting with positional, intermittent dizziness that began 11/3 in the morning. Patient says that when he wakes up in the morning, when laying down, he has no room spinning, but when he sits up, he feels like the room starts shifting side to side. Patient says 11/3 AM, dizziness resolved after 1 minute. On 11/4 AM, patient once again stood up from bed, and the dizziness did not go away. Patient feels like his dizziness was exacerbated with working out. Says that he vomited once this AM had some difficulty with his gait. Denies weakness, numbness, vision changes, HA. Says he is compliant with his Eliquis, statin, ASA. Of note, when patient presented last time to [facility] as a code stroke, he had sx of left hand weakness and hypoesthesia and received tpa. Upon review of MRI imaging, no evidence of acute infarct at the time of TIA presentations. MRI significant for mild chronic white matter ischemic changes. No evidence on MRI from August 2022 of prior infarcts.     NIHSS on exam is 0. Upon examining patient, he says his dizziness was better.     SUBJECTIVE: No events overnight.  No new neurologic complaints.      apixaban 5 milliGRAM(s) Oral two times a day  aspirin  chewable 81 milliGRAM(s) Oral daily  atorvastatin 80 milliGRAM(s) Oral at bedtime  cefTRIAXone   IVPB 1000 milliGRAM(s) IV Intermittent every 24 hours  meclizine 25 milliGRAM(s) Oral two times a day PRN  tamsulosin 0.4 milliGRAM(s) Oral at bedtime    PHYSICAL EXAM:   Vital Signs Last 24 Hrs  T(C): 36.8 (07 Nov 2022 04:43), Max: 36.8 (06 Nov 2022 14:03)  T(F): 98.2 (07 Nov 2022 04:43), Max: 98.2 (06 Nov 2022 14:03)  HR: 68 (07 Nov 2022 04:43) (57 - 73)  BP: 116/76 (07 Nov 2022 04:43) (102/68 - 130/88)  BP(mean): 97 (06 Nov 2022 10:13) (97 - 97)  RR: 18 (07 Nov 2022 04:43) (18 - 18)  SpO2: 95% (07 Nov 2022 04:43) (94% - 97%)    Parameters below as of 07 Nov 2022 04:43  Patient On (Oxygen Delivery Method): room air    General: No acute distress  HEENT: EOM intact, visual fields full, corrective saccades b/l on head impulse testing  Abdomen: Soft, nontender, nondistended   Extremities: No edema    NEUROLOGICAL EXAM:  Mental status: Awake, alert, oriented x3, speech is fluent, follows commands, IDs objects   Cranial Nerves: No facial asymmetry, no nystagmus, no dysarthria,  tongue midline  Motor exam: Normal tone, no drift, 5/5 RUE, 5/5 RLE, 5/5 LUE, 5/5 LLE  Sensation: Intact to light touch   Coordination/ Gait: No dysmetria, gait deferred.     LABS:                        13.6   6.35  )-----------( 167      ( 07 Nov 2022 05:45 )             41.1    11-07    139  |  105  |  16  ----------------------------<  97  4.0   |  22  |  0.95    Ca    8.8      07 Nov 2022 05:44  Mg     2.2     11-06        IMAGING: Reviewed by me.     CT C/A/P w/w/out IV cont (11/5/22):  Bilateral small scattered pulmonary nodules. Consider follow-up chest CT in 6-12 months as per clinical risk factors for malignancy.No suspicious findings in the abdomen or pelvis.    MRI Head No Cont (11/5/22):  Subcentimeter foci of T2 prolongation with restricted diffusion is suspected involving the right centrum semiovale and right posterior frontal subcortical region. These findings are likely compatible with acute lacunar infarcts. No hemorrhagic transformation seen. No significant shift or herniation is seen.    (11/4/22):  CT HEAD: Stable exam. No acute intracranial hemorrhage, vasogenic edema, hydrocephalus or extra-axial collection.    CTA BRAIN: Stable exam. Patent intracranial circulation. No flow-limiting stenosis or occlusion. No evidence of aneurysm.    CTA NECK: Stable exam. Patent cervical vasculature. No flow limiting stenosis or occlusion. No evidence of dissection.    NASCET CAROTID STENOSIS CRITERIA (distal normal appearing ICA as   denominator for measurement): 0%-none, 1-49%-mild, 50-70%-moderate,   70-89%-severe, 90-99%-critical.  
Name of Patient : TAMI DUNHAM  MRN: [id number]  Date of visit: 11-07-22 @ 10:37      Subjective: Patient seen and examined. No new events except as noted.   Patient seen earlier this AM. Lying down in bed    REVIEW OF SYSTEMS:    CONSTITUTIONAL: No weakness, fevers or chills  EYES/ENT: No visual changes;  No vertigo or throat pain   NECK: No pain or stiffness  RESPIRATORY: No cough, wheezing, hemoptysis; No shortness of breath  CARDIOVASCULAR: No chest pain or palpitations  GASTROINTESTINAL: No abdominal or epigastric pain. No nausea, vomiting, or hematemesis; No diarrhea or constipation. No melena or hematochezia.  GENITOURINARY: No dysuria, frequency or hematuria  NEUROLOGICAL: No numbness or weakness  SKIN: No itching, burning, rashes, or lesions   All other review of systems is negative unless indicated above.    MEDICATIONS:  MEDICATIONS  (STANDING):  apixaban 5 milliGRAM(s) Oral two times a day  aspirin  chewable 81 milliGRAM(s) Oral daily  atorvastatin 80 milliGRAM(s) Oral at bedtime  cefpodoxime 100 milliGRAM(s) Oral every 12 hours  tamsulosin 0.4 milliGRAM(s) Oral at bedtime      PHYSICAL EXAM:  T(C): 36.7 (11-07-22 @ 09:28), Max: 36.8 (11-06-22 @ 14:03)  HR: 68 (11-07-22 @ 04:43) (57 - 68)  BP: 126/82 (11-07-22 @ 09:28) (102/68 - 128/84)  RR: 18 (11-07-22 @ 09:28) (18 - 18)  SpO2: 96% (11-07-22 @ 09:28) (94% - 97%)  Wt(kg): --  I&O's Summary    06 Nov 2022 07:01  -  07 Nov 2022 07:00  --------------------------------------------------------  IN: 480 mL / OUT: 0 mL / NET: 480 mL          Appearance: Normal, Lying down in bed   HEENT:  Eyes are open; Glasses    Lymphatic: No lymphadenopathy   Cardiovascular: Normal S1 S2, no JVD  Respiratory: normal effort , clear  Gastrointestinal:  Soft, Non-tender  Skin: No rashes,  warm to touch  Psychiatry:  Mood & affect appropriate  Musculoskeletal: No edema      11-06-22 @ 07:01  -  11-07-22 @ 07:00  --------------------------------------------------------  IN: 480 mL / OUT: 0 mL / NET: 480 mL                            13.6   6.35  )-----------( 167      ( 07 Nov 2022 05:45 )             41.1               11-07    139  |  105  |  16  ----------------------------<  97  4.0   |  22  |  0.95    Ca    8.8      07 Nov 2022 05:44  Mg     2.2     11-06                           Culture - Urine (11.06.22 @ 12:39)   Specimen Source: Clean Catch Clean Catch (Midstream)   Culture Results:   <10,000 CFU/mL Normal Urogenital Narda       < from: CT Abdomen and Pelvis w/ Oral Cont and w/ IV Cont (11.05.22 @ 20:39) >  IMPRESSION:  Bilateral small scattered pulmonary nodules. Consider follow-up chest CT   in 6-12 months as per clinical risk factors for malignancy.  No suspicious findings in the abdomen or pelvis.        --- End of Report ---           MARICRUZ ALONZO MD; Resident Radiologist  This document has been electronically signed.  URIEL VELA MD; Attending Radiologist  This document has been electronically signed. Nov 6 20222:[id number]    < end of copied text >        
Subjective: Patient seen and examined. No new events except as noted.     REVIEW OF SYSTEMS:    CONSTITUTIONAL: + weakness, fevers or chills  EYES/ENT: No visual changes;  No vertigo or throat pain   NECK: No pain or stiffness  RESPIRATORY: No cough, wheezing, hemoptysis; No shortness of breath  CARDIOVASCULAR: No chest pain or palpitations  GASTROINTESTINAL: No abdominal or epigastric pain. No nausea, vomiting, or hematemesis; No diarrhea or constipation. No melena or hematochezia.  GENITOURINARY: No dysuria, frequency or hematuria  NEUROLOGICAL: No numbness or weakness  SKIN: No itching, burning, rashes, or lesions   All other review of systems is negative unless indicated above.    MEDICATIONS:  MEDICATIONS  (STANDING):  apixaban 5 milliGRAM(s) Oral two times a day  aspirin  chewable 81 milliGRAM(s) Oral daily  atorvastatin 80 milliGRAM(s) Oral at bedtime  cefpodoxime 100 milliGRAM(s) Oral every 12 hours  tamsulosin 0.4 milliGRAM(s) Oral at bedtime    PHYSICAL EXAM:  T(C): 36.8 (11-07-22 @ 04:43), Max: 36.8 (11-06-22 @ 14:03)  HR: 68 (11-07-22 @ 04:43) (57 - 68)  BP: 116/76 (11-07-22 @ 04:43) (102/68 - 128/84)  RR: 18 (11-07-22 @ 04:43) (18 - 18)  SpO2: 95% (11-07-22 @ 04:43) (94% - 97%)  Wt(kg): --  I&O's Summary    06 Nov 2022 07:01  -  07 Nov 2022 07:00  --------------------------------------------------------  IN: 480 mL / OUT: 0 mL / NET: 480 mL    Appearance: Normal	  HEENT: Normal oral mucosa, PERRL, EOMI	  Lymphatic: No lymphadenopathy , no edema  Cardiovascular: Normal S1 S2, No JVD, No murmurs , Peripheral pulses palpable 2+ bilaterally  Respiratory: Lungs clear to auscultation, normal effort 	  Gastrointestinal:  Soft, Non-tender, + BS	  Skin: No rashes, No ecchymoses, No cyanosis, warm to touch  NEUROLOGICAL EXAM:  Mental status: Awake, alert, oriented x3, speech is fluent, follows commands, IDs objects   Cranial Nerves: No facial asymmetry, no nystagmus, no dysarthria,  tongue midline  Motor exam: Normal tone, no drift, 5/5 RUE, 5/5 RLE, 5/5 LUE, 5/5 LLE  Sensation: Intact to light touch   Coordination/ Gait: No dysmetria, gait deferred.   Ext: No edema    LABS:    CARDIAC MARKERS:                        13.6   6.35  )-----------( 167      ( 07 Nov 2022 05:45 )             41.1     11-07    139  |  105  |  16  ----------------------------<  97  4.0   |  22  |  0.95    Ca    8.8      07 Nov 2022 05:44  Mg     2.2     11-06    proBNP:   Lipid Profile:   HgA1c:   TSH:     TELEMETRY: SR 55-70	    ECG:  	  RADIOLOGY:   DIAGNOSTIC TESTING:  [ ] Echocardiogram:  [ ]  Catheterization:  [ ] Stress Test:    OTHER:   
Name of Patient : TAMI DUNHAM  MRN: [id number]  Date of visit: 22 @ 13:57      Subjective: Patient seen and examined. No new events except as noted.   Patient seen earlier this AM. OOB TC  Denies CP, palpitations, SOB or dyspnea    REVIEW OF SYSTEMS:    CONSTITUTIONAL: No weakness, fevers or chills  EYES/ENT: No visual changes;  No vertigo or throat pain   NECK: No pain or stiffness  RESPIRATORY: No cough, wheezing, hemoptysis; No shortness of breath  CARDIOVASCULAR: No chest pain or palpitations  GASTROINTESTINAL: No abdominal or epigastric pain. No nausea, vomiting, or hematemesis; No diarrhea or constipation. No melena or hematochezia.  GENITOURINARY: No dysuria, frequency or hematuria  NEUROLOGICAL: No numbness or weakness  SKIN: No itching, burning, rashes, or lesions   All other review of systems is negative unless indicated above.    MEDICATIONS:  MEDICATIONS  (STANDING):  apixaban 5 milliGRAM(s) Oral two times a day  aspirin  chewable 81 milliGRAM(s) Oral daily  atorvastatin 80 milliGRAM(s) Oral at bedtime  cefTRIAXone   IVPB 1000 milliGRAM(s) IV Intermittent every 24 hours  tamsulosin 0.4 milliGRAM(s) Oral at bedtime      PHYSICAL EXAM:  T(C): 36.3 (22 @ 10:13), Max: 36.7 (22 @ 16:15)  HR: 73 (22 @ 10:13) (55 - 73)  BP: 122/85 (22 @ 10:13) (118/74 - 130/88)  RR: 18 (22 @ 10:13) (18 - 18)  SpO2: 95% (22 @ 10:13) (95% - 97%)  Wt(kg): --  I&O's Summary        Appearance: Normal, OOB TC	  HEENT:  Eyes are open; Glasses  Lymphatic: No lymphadenopathy   Cardiovascular: Normal S1 S2, no JVD  Respiratory: normal effort , clear  Gastrointestinal:  Soft, Non-tender  Skin: No rashes,  warm to touch  Psychiatry:  Mood & affect appropriate  Musculoskeletal: No edema                            13.4   5.66  )-----------( 181      ( 2022 07:56 )             41.0                   140  |  106  |  14  ----------------------------<  88  3.7   |  23  |  0.95    Ca    8.6      2022 07:56  Mg     2.2         TPro  7.3  /  Alb  4.2  /  TBili  0.6  /  DBili  x   /  AST  21  /  ALT  28  /  AlkPhos  74                         Urinalysis Basic - ( 2022 07:16 )    Color: Light Yellow / Appearance: Clear / S.025 / pH: x  Gluc: x / Ketone: Negative  / Bili: Negative / Urobili: Negative   Blood: x / Protein: Negative / Nitrite: Negative   Leuk Esterase: Large / RBC: 1 /hpf / WBC 46 /HPF   Sq Epi: x / Non Sq Epi: 0 /hpf / Bacteria: Many            < from: TTE with Doppler (w/Cont) (22 @ 11:52) >    Patient name: TAMI DUNHAM  YOB: 1970   Age: 52 (M)   MR#: [id number]  Study Date: 2022  Location: [place] [address]  DSonographer: RAFFI Mckeon  Study quality: Technically good  Referring Physician:   Chrystal Viera MD  Blood Pressure: 134/86 mmHg  Height: 191 cm  Weight: 95 kg  BSA: 2.2 m2  ------------------------------------------------------------------------  PROCEDURE: Transthoracic echocardiogram with 2-D, M-Mode  and complete spectral and color flow Doppler. Real-time and  reconstructed 3-dimensional imaging was performed with  Workstation. Color Doppler analysis was carried out using  both 2D and 3D mapping. Verbal consent was obtained for  injection of  Ultrasonic Enhancing Agent following a  discussion of risks and benefits. Following intravenous  injection of Ultrasonic Enhancing Agent, harmonic imaging  was performed.  INDICATION: Other cerebrovascular disease (I67.89)  ------------------------------------------------------------------------  Dimensions:    Normal Values:  LA:     3.1    2.0 - 4.0 cm  Ao:     3.4    2.0 - 3.8 cm  SEPTUM: 1.0    0.6 - 1.2 cm  PWT:    0.9    0.6 - 1.1 cm  LVIDd:  4.5    3.0 - 5.6 cm  LVIDs:  3.2    1.8 - 4.0 cm  Derived variables:  LVMI: 64 g/m2  RWT: 0.40  Fractionalshort: 29 %  EF (3D Quantification): 57 %Doppler Peak Velocity (m/sec):  AoV=1.1  ------------------------------------------------------------------------  Observations:  Mitral Valve: Normal mitral valve.  Aortic Valve/Aorta: Normal trileaflet aortic valve. Peak  transaortic valve gradient equals 5 mm Hg. Peak left  ventricular outflow tract gradient equals 3 mm Hg, mean  gradient is equal to 2 mm Hg, LVOT velocity time integral  equals 21 cm.  Aortic Root: 3.4 cm.  Sinotubular Junction: 3 cm.  Ascending Aorta: 3.6 cm.  LVOT diameter: 2.2 cm.  Aortic arch 3.9 cm.  Left Atrium: Normal left atrium.  LA volume index = 22  cc/m2.  Left Ventricle: Normal left ventricular systolic function.  No segmental wall motion abnormalities. Normal left  ventricular internal dimensions and wall thicknesses.  Normal diastolic function  Right Heart: Normal right atrium. Normal right ventricular  size and function. Normal tricuspid valve. Minimal  tricuspid regurgitation. Normal pulmonic valve.  Pericardium/Pleura: Normal pericardium with no pericardial  effusion.  Hemodynamic: Estimated right atrial pressure is 8 mm Hg.  ------------------------------------------------------------------------  Conclusions:  1. Aortic Root: 3.4 cm.  Sinotubular Junction: 3 cm.  Ascending Aorta: 3.6 cm.  LVOT diameter: 2.2 cm.  Aortic arch 3.9 cm.  2. Normal left ventricular internal dimensions and wall  thicknesses.  3. Normal left ventricular systolic function. No segmental  wall motion abnormalities.  4. Normal diastolic function  5. Normal right ventricular size and function.  ------------------------------------------------------------------------  Confirmed on  2022 - 14:10:41 by MARY Coreas  ------------------------------------------------------------------------    < end of copied text >        < from: [facility] Duplex Lower Ext Vein Scan, [place] (22 @ 20:01) >  IMPRESSION:  No evidence of deep venous thrombosis in either lower extremity.        < end of copied text >

## 2022-11-07 NOTE — DISCHARGE NOTE PROVIDER - HOSPITAL COURSE
53yo L-handed man with a PMHx significant for TIA 2010, 2013, February 2022 s/p tpa?, August 2022 s/p tpa, HLD, and a septal defect (on Eliquis) presenting with positional, intermittent dizziness that began 11/3 in the morning. Patient says that when he wakes up in the morning, when laying down, he has no room spinning, but when he sits up, he feels like the room starts shifting side to side. Patient says 11/3 AM, dizziness resolved after 1 minute. On 11/4 AM, patient once again stood up from bed, and the dizziness did not go away. Patient feels like his dizziness was exacerbated with working out. Says that he vomited once this AM had some difficulty with his gait. Denies weakness, numbness, vision changes, HA. Says he is compliant with his Eliquis, statin, ASA. Of note, when patient presented last time to [facility] as a code stroke, he had sx of left hand weakness and hypoesthesia and received tpa. Upon review of MRI imaging, no evidence of acute infarct at the time of TIA presentations. MRI significant for mild chronic white matter ischemic changes. No evidence on MRI from August 2022 of prior infarcts.     NIHSS on exam is 0. Upon examining patient, he says his dizziness was better.     CT C/A/P w/w/out IV cont (11/5/22):  Bilateral small scattered pulmonary nodules. Consider follow-up chest CT in 6-12 months as per clinical risk factors for malignancy.No suspicious findings in the abdomen or pelvis.    MRI Head No Cont (11/5/22):  Subcentimeter foci of T2 prolongation with restricted diffusion is suspected involving the right centrum semiovale and right posterior frontal subcortical region. These findings are likely compatible with acute lacunar infarcts. No hemorrhagic transformation seen. No significant shift or herniation is seen.    (11/4/22):  CT HEAD: Stable exam. No acute intracranial hemorrhage, vasogenic edema, hydrocephalus or extra-axial collection.    CTA BRAIN: Stable exam. Patent intracranial circulation. No flow-limiting stenosis or occlusion. No evidence of aneurysm.    CTA NECK: Stable exam. Patent cervical vasculature. No flow limiting stenosis or occlusion. No evidence of dissection. 51yo L-handed man with a PMHx significant for TIA 2010, 2013, February 2022 s/p tpa?, August 2022 s/p tpa, HLD, and a septal defect (on Eliquis) presenting with positional, intermittent dizziness that began 11/3 in the morning. Patient says that when he wakes up in the morning, when laying down, he has no room spinning, but when he sits up, he feels like the room starts shifting side to side. Patient says 11/3 AM, dizziness resolved after 1 minute. On 11/4 AM, patient once again stood up from bed, and the dizziness did not go away. Patient feels like his dizziness was exacerbated with working out. Says that he vomited once this AM had some difficulty with his gait. Denies weakness, numbness, vision changes, HA. Says he is compliant with his Eliquis, statin, ASA. Of note, when patient presented last time to [facility] as a code stroke, he had sx of left hand weakness and hypoesthesia and received tpa. Upon review of MRI imaging, no evidence of acute infarct at the time of TIA presentations. MRI significant for mild chronic white matter ischemic changes. No evidence on MRI from August 2022 of prior infarcts.     NIHSS on exam is 0. Upon examining patient, he says his dizziness was better.     CT C/A/P w/w/out IV cont (11/5/22):  Bilateral small scattered pulmonary nodules. Consider follow-up chest CT in 6-12 months as per clinical risk factors for malignancy.No suspicious findings in the abdomen or pelvis.    MRI Head No Cont (11/5/22):  Subcentimeter foci of T2 prolongation with restricted diffusion is suspected involving the right centrum semiovale and right posterior frontal subcortical region. These findings are likely compatible with acute lacunar infarcts. No hemorrhagic transformation seen. No significant shift or herniation is seen.    (11/4/22):  CT HEAD: Stable exam. No acute intracranial hemorrhage, vasogenic edema, hydrocephalus or extra-axial collection.    CTA BRAIN: Stable exam. Patent intracranial circulation. No flow-limiting stenosis or occlusion. No evidence of aneurysm.    CTA NECK: Stable exam. Patent cervical vasculature. No flow limiting stenosis or occlusion. No evidence of dissection.    Impression: Acute lacunar infarcts found in right centrum semiovale and right posterior frontal subcortical region etiology likely embolic.  Has a known PFO, MIMI in the past, planned PFO closure with Dr. Lynn on 11/16. No need for repeat MIMI as per Dr. Lynn  ILR already in place. Will to continue to ASA and Eliquis.  VA Duplex: No evidence of deep venous thrombosis in either lower extremity.  UA positive for UTI, started on abx and will need to follow with urology as outpatient.     Evaluated by PT/OT and was recommended home with no services. Patient stable for discharge. 51yo L-handed man with a PMHx significant for TIA 2010, 2013, February 2022 s/p tpa?, August 2022 s/p tpa, HLD, and a septal defect (on Eliquis) presenting with positional, intermittent dizziness that began 11/3 in the morning. Patient says that when he wakes up in the morning, when laying down, he has no room spinning, but when he sits up, he feels like the room starts shifting side to side. Patient says 11/3 AM, dizziness resolved after 1 minute. On 11/4 AM, patient once again stood up from bed, and the dizziness did not go away. Patient feels like his dizziness was exacerbated with working out. Says that he vomited once this AM had some difficulty with his gait. Denies weakness, numbness, vision changes, HA. Says he is compliant with his Eliquis, statin, ASA. Of note, when patient presented last time to [facility] as a code stroke, he had sx of left hand weakness and hypoesthesia and received tpa. Upon review of MRI imaging, no evidence of acute infarct at the time of TIA presentations. MRI significant for mild chronic white matter ischemic changes. No evidence on MRI from August 2022 of prior infarcts.     NIHSS on exam is 0. Upon examining patient, he says his dizziness was better.     CT C/A/P w/w/out IV cont (11/5/22):  Bilateral small scattered pulmonary nodules. Consider follow-up chest CT in 6-12 months as per clinical risk factors for malignancy.No suspicious findings in the abdomen or pelvis.    MRI Head No Cont (11/5/22):  Subcentimeter foci of T2 prolongation with restricted diffusion is suspected involving the right centrum semiovale and right posterior frontal subcortical region. These findings are likely compatible with acute lacunar infarcts. No hemorrhagic transformation seen. No significant shift or herniation is seen.    (11/4/22):  CT HEAD: Stable exam. No acute intracranial hemorrhage, vasogenic edema, hydrocephalus or extra-axial collection.    CTA BRAIN: Stable exam. Patent intracranial circulation. No flow-limiting stenosis or occlusion. No evidence of aneurysm.    CTA NECK: Stable exam. Patent cervical vasculature. No flow limiting stenosis or occlusion. No evidence of dissection.    Impression: Acute lacunar infarcts found in right centrum semiovale and right posterior frontal subcortical region etiology likely embolic.  Has a known PFO, MIMI in the past, planned PFO closure with Dr. Lynn on 11/16. No need for repeat MIMI as per Dr. Lynn  ILR already in place. Will to continue to ASA and Eliquis.  VA Duplex: No evidence of deep venous thrombosis in either lower extremity.  UA positive for UTI, started on abx and will need to follow with urology as outpatient.     Evaluated by PT/OT and was recommended home with no services. Patient is stable for discharge.

## 2022-11-07 NOTE — DISCHARGE NOTE PROVIDER - CARE PROVIDERS DIRECT ADDRESSES
,DirectAddress_Unknown,DirectAddress_Unknown,[email or web address] ,DirectAddress_Unknown,DirectAddress_Unknown,[email or web address],DirectAddress_Unknown

## 2022-11-07 NOTE — PROGRESS NOTE ADULT - ASSESSMENT
53yo L-handed man with a PMHx significant for TIA 2010, 2013, February 2022 s/p tpa?, August 2022 s/p tpa, HLD, and a septal defect (on Eliquis) presenting with positional, intermittent dizziness that began 11/3 in the morning.

## 2022-11-08 LAB
ANA TITR SER: NEGATIVE — SIGNIFICANT CHANGE UP
DSDNA AB FLD-ACNC: <0.2 AI — SIGNIFICANT CHANGE UP
DSDNA AB SER-ACNC: <12 IU/ML — SIGNIFICANT CHANGE UP
ENA SS-A AB FLD IA-ACNC: <0.2 AI — SIGNIFICANT CHANGE UP

## 2022-11-09 LAB
ACE SERPL-CCNC: 37 U/L — SIGNIFICANT CHANGE UP (ref 14–82)
APCR PPP: 2.87 RATIO — SIGNIFICANT CHANGE UP
DNA PLOIDY SPEC FC-IMP: SIGNIFICANT CHANGE UP
DRVVT SCREEN TO CONFIRM RATIO: SIGNIFICANT CHANGE UP
LA NT DPL PPP QL: 33.5 SEC — SIGNIFICANT CHANGE UP
NORMALIZED SCT PPP-RTO: 0.9 RATIO — SIGNIFICANT CHANGE UP (ref 0–1.16)
NORMALIZED SCT PPP-RTO: SIGNIFICANT CHANGE UP
PROT S FREE PPP-ACNC: 93 % — SIGNIFICANT CHANGE UP (ref 63–140)
PTR INTERPRETATION: SIGNIFICANT CHANGE UP

## 2022-11-11 PROBLEM — R42 DIZZINESS AND GIDDINESS: Chronic | Status: ACTIVE | Noted: 2022-11-04

## 2022-11-14 ENCOUNTER — NON-APPOINTMENT (OUTPATIENT)
Age: 52
End: 2022-11-14

## 2022-11-14 DIAGNOSIS — Z11.52 ENCOUNTER FOR SCREENING FOR COVID-19: ICD-10-CM

## 2022-11-16 LAB — MTHFR GENE MUT ANL BLD/T: SIGNIFICANT CHANGE UP

## 2022-11-19 LAB — SARS-COV-2 N GENE NPH QL NAA+PROBE: NOT DETECTED

## 2022-11-21 LAB — SARS-COV-2 N GENE NPH QL NAA+PROBE: NOT DETECTED

## 2022-11-22 ENCOUNTER — OUTPATIENT (OUTPATIENT)
Dept: OUTPATIENT SERVICES | Facility: HOSPITAL | Age: 52
LOS: 1 days | End: 2022-11-22
Payer: COMMERCIAL

## 2022-11-22 ENCOUNTER — APPOINTMENT (OUTPATIENT)
Dept: CV DIAGNOSITCS | Facility: HOSPITAL | Age: 52
End: 2022-11-22

## 2022-11-22 ENCOUNTER — TRANSCRIPTION ENCOUNTER (OUTPATIENT)
Age: 52
End: 2022-11-22

## 2022-11-22 VITALS
HEART RATE: 67 BPM | RESPIRATION RATE: 16 BRPM | DIASTOLIC BLOOD PRESSURE: 91 MMHG | TEMPERATURE: 97 F | OXYGEN SATURATION: 97 % | SYSTOLIC BLOOD PRESSURE: 127 MMHG

## 2022-11-22 VITALS
OXYGEN SATURATION: 97 % | HEART RATE: 70 BPM | DIASTOLIC BLOOD PRESSURE: 88 MMHG | WEIGHT: 210.1 LBS | TEMPERATURE: 97 F | SYSTOLIC BLOOD PRESSURE: 140 MMHG | RESPIRATION RATE: 20 BRPM | HEIGHT: 75 IN

## 2022-11-22 DIAGNOSIS — Z92.89 PERSONAL HISTORY OF OTHER MEDICAL TREATMENT: ICD-10-CM

## 2022-11-22 DIAGNOSIS — Q21.9 CONGENITAL MALFORMATION OF CARDIAC SEPTUM, UNSPECIFIED: ICD-10-CM

## 2022-11-22 DIAGNOSIS — Z86.2 PERSONAL HISTORY OF DISEASES OF THE BLOOD AND BLOOD-FORMING ORGANS AND CERTAIN DISORDERS INVOLVING THE IMMUNE MECHANISM: ICD-10-CM

## 2022-11-22 DIAGNOSIS — R79.89 OTHER SPECIFIED ABNORMAL FINDINGS OF BLOOD CHEMISTRY: ICD-10-CM

## 2022-11-22 LAB
ANION GAP SERPL CALC-SCNC: 8 MMOL/L — SIGNIFICANT CHANGE UP (ref 5–17)
BUN SERPL-MCNC: 17 MG/DL — SIGNIFICANT CHANGE UP (ref 7–23)
CALCIUM SERPL-MCNC: 9 MG/DL — SIGNIFICANT CHANGE UP (ref 8.4–10.5)
CHLORIDE SERPL-SCNC: 104 MMOL/L — SIGNIFICANT CHANGE UP (ref 96–108)
CO2 SERPL-SCNC: 27 MMOL/L — SIGNIFICANT CHANGE UP (ref 22–31)
CREAT SERPL-MCNC: 0.94 MG/DL — SIGNIFICANT CHANGE UP (ref 0.5–1.3)
EGFR: 98 ML/MIN/1.73M2 — SIGNIFICANT CHANGE UP
GLUCOSE SERPL-MCNC: 95 MG/DL — SIGNIFICANT CHANGE UP (ref 70–99)
HCT VFR BLD CALC: 41.1 % — SIGNIFICANT CHANGE UP (ref 39–50)
HGB BLD-MCNC: 13.9 G/DL — SIGNIFICANT CHANGE UP (ref 13–17)
MCHC RBC-ENTMCNC: 29.4 PG — SIGNIFICANT CHANGE UP (ref 27–34)
MCHC RBC-ENTMCNC: 33.8 GM/DL — SIGNIFICANT CHANGE UP (ref 32–36)
MCV RBC AUTO: 86.9 FL — SIGNIFICANT CHANGE UP (ref 80–100)
NRBC # BLD: 0 /100 WBCS — SIGNIFICANT CHANGE UP (ref 0–0)
PLATELET # BLD AUTO: 181 K/UL — SIGNIFICANT CHANGE UP (ref 150–400)
POTASSIUM SERPL-MCNC: 3.6 MMOL/L — SIGNIFICANT CHANGE UP (ref 3.5–5.3)
POTASSIUM SERPL-SCNC: 3.6 MMOL/L — SIGNIFICANT CHANGE UP (ref 3.5–5.3)
RBC # BLD: 4.73 M/UL — SIGNIFICANT CHANGE UP (ref 4.2–5.8)
RBC # FLD: 13.2 % — SIGNIFICANT CHANGE UP (ref 10.3–14.5)
SODIUM SERPL-SCNC: 139 MMOL/L — SIGNIFICANT CHANGE UP (ref 135–145)
WBC # BLD: 6.53 K/UL — SIGNIFICANT CHANGE UP (ref 3.8–10.5)
WBC # FLD AUTO: 6.53 K/UL — SIGNIFICANT CHANGE UP (ref 3.8–10.5)

## 2022-11-22 PROCEDURE — 99152 MOD SED SAME PHYS/QHP 5/>YRS: CPT

## 2022-11-22 PROCEDURE — 93662 INTRACARDIAC ECG (ICE): CPT

## 2022-11-22 PROCEDURE — C1759: CPT

## 2022-11-22 PROCEDURE — 93005 ELECTROCARDIOGRAM TRACING: CPT

## 2022-11-22 PROCEDURE — 93662 INTRACARDIAC ECG (ICE): CPT | Mod: 26

## 2022-11-22 PROCEDURE — 85027 COMPLETE CBC AUTOMATED: CPT

## 2022-11-22 PROCEDURE — 93451 RIGHT HEART CATH: CPT | Mod: 26

## 2022-11-22 PROCEDURE — 93010 ELECTROCARDIOGRAM REPORT: CPT

## 2022-11-22 PROCEDURE — 93451 RIGHT HEART CATH: CPT

## 2022-11-22 PROCEDURE — C1769: CPT

## 2022-11-22 PROCEDURE — 80048 BASIC METABOLIC PNL TOTAL CA: CPT

## 2022-11-22 PROCEDURE — C1760: CPT

## 2022-11-22 PROCEDURE — C1894: CPT

## 2022-11-22 NOTE — ASU PATIENT PROFILE, ADULT - NS PRO ABUSE SCREEN SUSPICION NEGLECT YN
Pt presented with   Stomach Pain nausea, vomited once, feels like rock in stomach x 1 week   Pt is a 53yr old female who presents with worsening stomach pain in the past 24hrs. Pt reports unable to sleep due to the pain.  Returned from [facility] over a week ag no

## 2022-11-22 NOTE — H&P CARDIOLOGY - HISTORY OF PRESENT ILLNESS
This is a 52 year old gentleman with a PMH of CVA and TIAs ( 2/2022) s/p TPA, (residual expressive aphasia),HLD, ASD, on eliquis ( last dose was 11/19 evening, referred by Dr Oneil for an ASD closure this am with Dr Master Lynn.

## 2022-11-22 NOTE — ASU DISCHARGE PLAN (ADULT/PEDIATRIC) - CARE PROVIDER_API CALL
Master Lynn)  Cardiology; Internal Medicine; Interventional Cardiology  [address] [place] [address]  Phone: [phone]  Fax: [phone]  Follow Up Time:

## 2022-11-22 NOTE — ASU DISCHARGE PLAN (ADULT/PEDIATRIC) - FOLLOW UP APPOINTMENTS
[address]  Department of Internal Medicine   Internal Medicine Residency Program   Discharge Summary      House Team: 1    Admit Date:9/8/2020  Discharge Date: 9/12/2020    Admission Diagnosis:     1. Syncope   2. Hx of PVCs and non-sustained v-tach   2. Hx of AVR (2009) on chronic anticoagulation   3. Hx of HTN   4. Hx of HLD   5. Hx of COPD and Asthma   6. Hx of Ascending Aortic Aneurysm, stable   5, Hx of Anxiety and Depression   6. Hx of Restless leg syndrome   7. Hx of Tobacco use     Discharge Diagnosis:  1. Syncope   2. Hx of PVCs and non-sustained v-tach s/p ILC insertion 9/11/2020   2. Hx of AVR (2009) on chronic anticoagulation   3. Hx of HTN   4. Hx of HLD   5. Hx of COPD and Asthma   6. Hx of Ascending Aortic Aneurysm, stable   5, Hx of Anxiety and Depression   6. Hx of Restless leg syndrome   7. Hx of Tobacco use     Hospital Course: Beryle Minister is a 64 y.o. female with a PMHx of HTN, HLD, AVR maintained on warfarin, GERD, depression, restless leg syndrome, AAA who presented at the ED for Syncope. Cardiology and EP were consulted for cardiac workup. Stress test did not show any inducible ischemia, cardiac cath 9/11 showing mild to mod RCA disease (40-50%), no abnormalities in the mechanical AV.  s/p EP study 9/11 Normal AV node and His purkinje system function, no inducible arrythmias. She had Implantable Loop recorder placement on the same day. Due to the cardiac work-up, her warfarin was switched to heparin gtt. During her hospital stay she has not had any occurrences of the syncopal episodes. Patient was discontinued on the heparin drip and was agreeable to be discharged on Lovenox 80 mg BID with her usual dose of warfarin 5 mg and 6 mg alternately. She will have a repeat INR on Monday and if it is therapeutic 2x then she may discontinue lovenox. She will need repeat INR once/week for 2 weeks until stable and then 1 INR check 1x/month.      Significant findings (history and exam, laboratory, radiological, pathology, other tests):  CBC:   Lab Results   Component Value Date    WBC 10.5 2020    RBC 4.00 2020    HGB 12.8 2020    HCT 39.1 2020    MCV 97.8 2020    MCH 32.0 2020    MCHC 32.7 2020    RDW 13.0 2020     2020    MPV 11.0 2020     CMP:    Lab Results   Component Value Date     2020    K 4.1 2020    CL 98 2020    CO2 26 2020    BUN 13 2020    CREATININE 0.7 2020    GFRAA >60 2020    LABGLOM >60 2020    GLUCOSE 104 2020    PROT 6.3 2020    LABALBU 4.0 2020    CALCIUM 9.1 2020    BILITOT 0.6 2020    ALKPHOS 64 2020    AST 20 2020    ALT 21 2020        Ct Head Wo Contrast    Result Date: 2020  Patient MRN:  [id number] : 1959 Age: 64 years Gender: Female Order Date:  2020 8:11 PM EXAM: CT HEAD WO CONTRAST NUMBER OF IMAGES:  136 INDICATION:  syncope syncope COMPARISON: Brain MRI 2019 Technique: Low-dose CT  acquisition technique included one of following options; 1 . Automated exposure control, 2. Adjustment of MA and or KV according to patient's size or 3. Use of iterative reconstruction. Multiple CT sections were obtained with sagittal and coronal MPR reconstructions. FINDINGS: There is no evidence of acute intracranial hemorrhage, loss of distinction between gray and white matter, mass, significant mass effect or abnormal extra-axial collection. Encephalomalacia within the anterior right temporal lobe. Moderate burden of periventricular and subcortical white matter hypodensities, likely sequela of microangiopathic disease. The ventricles are unchanged in caliber. The basilar cisterns are patent. The density in the dural venous sinuses is normal. The skull base and calvarium are unchanged. The paranasal sinuses and mastoid air cells are predominantly clear.  The visualized orbits are unremarkable. No CT evidence of acute intracranial pathology. Ct Cervical Spine Wo Contrast    Result Date: 2020  Patient MRN:  [id number] : 1959 Age: 64 years Gender: Female Order Date:  2020 8:11 PM EXAM: CT CERVICAL SPINE WO CONTRAST COMPARISON: None INDICATION:  fall fall TECHNIQUE: Axial unenhanced CT scanning was performed through the cervical spine. Reformatted coronal and sagittal views also obtained. Low-dose CT acquisition technique included one of the following options; 1. Automated exposure control, 2. Adjustment of mA and/or kV according to the patient's size or 3. Use of iterative reconstruction. FINDINGS: No fracture or malalignment of the cervical spine. There is moderate disc space narrowing at level of C5/C6. Significant facet arthritis is present at multiple levels notable on the left at level of C4/C5. No fracture or malalignment of the cervical spine. Xr Chest Portable    Result Date: 2020  Patient MRN:  [id number] : 1959 Age: 64 years Gender: Female Order Date:  2020 9:32 PM EXAM: XR CHEST PORTABLE COMPARISON: None INDICATION:  syncope syncope FINDINGS: Median sternotomy wires are present. No focal airspace opacity or pleural effusion. No pneumothorax. The heart is normal in size. No focal airspace opacity or pleural effusion. Nm Cardiac Stress Test Nuclear Imaging    Result Date: 9/10/2020  Patient MRN:  [id number] : 1959 Age: 64 years Gender: Female Order Date:  9/10/2020 9:13 AM EXAM: NM MYOCARDIAL SPECT REST EXERCISE OR RX Number of Images: 9 views INDICATION:  EKG abnormalities Reason for Exam?->EKG abnormalities Procedure Type->Rx COMPARISON: 2016 TECHNIQUE: 12.0 mCi of Tc-99m MIBI was injected intravenously at rest and cardiac SPECT images were performed. In addition 34.0 mCi of Tc-99m MIBI was injected intravenously at maximum stress by using [place]. Stress SPECT images and gated study were performed.   FINDINGS: Perfusion images demonstrate no reversible perfusion defect. There is a fixed perfusion abnormality at the anterior wall. Wall motion is globally very mildly hypokinetic. The right ventricle is hypertrophied. The end diastolic volume is 86 ml. The end systolic volume is 44 ml. The estimated ejection fraction is 49 %. 1. No reversible perfusion defect. Fixed anterior wall perfusion abnormality. 2. Ejection fraction is 49 %. 3. Global very mild hypokinesis. RVH. Pending test results: None    Consults: None    Procedures: None    Condition at discharge: Improved, stable    Disposition: home    Discharge Medications:  Discharge Medication List as of 9/12/2020  5:43 PM      START taking these medications    Details   enoxaparin (LOVENOX) 80 MG/0.8ML injection Inject 0.8 mLs into the skin 2 times daily for 7 days, Disp-14 Syringe,R-0Normal         CONTINUE these medications which have NOT CHANGED    Details   !! warfarin (COUMADIN) 5 MG tablet Take 5 mg by mouth daily Sunday, Monday, Wednesday, and FridaysHistorical Med      Cholecalciferol (VITAMIN D) 50 MCG (2000 UT) CAPS capsule Take 4,000 Units by mouth dailyHistorical Med      !! warfarin (COUMADIN) 6 MG tablet Take one tablet on Tuesday and Thursday. , Disp-30 tablet,R-1Normal      metoprolol succinate (TOPROL XL) 25 MG extended release tablet Take 1 tablet by mouth daily, Disp-90 tablet,R-0Normal      omeprazole (PRILOSEC) 40 MG delayed release capsule take 1 capsule by mouth every morning BEFORE BREAKFAST, Disp-90 capsule, R-0Normal      rOPINIRole (REQUIP) 0.25 MG tablet Take 1 tab in morning and 2 tabs before bed, Disp-270 tablet, R-0Normal      atorvastatin (LIPITOR) 10 MG tablet take 1 tablet by mouth once daily, Disp-90 tablet, R-0Normal      sertraline (ZOLOFT) 100 MG tablet take 1 and 1/2 tablet by mouth once daily, Disp-135 tablet, R-0Normal      lisinopril (PRINIVIL;ZESTRIL) 10 MG tablet take 1 tablet by mouth once daily, Disp-90 tablet, physician: Dr. Andre Williamson 181

## 2022-12-01 ENCOUNTER — APPOINTMENT (OUTPATIENT)
Dept: CARDIOLOGY | Facility: CLINIC | Age: 52
End: 2022-12-01

## 2022-12-02 ENCOUNTER — INPATIENT (INPATIENT)
Facility: HOSPITAL | Age: 52
LOS: 39 days | Discharge: NURSING, REHAB OR LONG-TERM CARE | End: 2023-01-11
Attending: INTERNAL MEDICINE | Admitting: INTERNAL MEDICINE
Payer: COMMERCIAL

## 2022-12-02 VITALS
SYSTOLIC BLOOD PRESSURE: 180 MMHG | DIASTOLIC BLOOD PRESSURE: 110 MMHG | TEMPERATURE: 105 F | RESPIRATION RATE: 30 BRPM | HEIGHT: 75 IN | HEART RATE: 130 BPM

## 2022-12-02 DIAGNOSIS — R41.82 ALTERED MENTAL STATUS, UNSPECIFIED: ICD-10-CM

## 2022-12-02 LAB
ALBUMIN SERPL ELPH-MCNC: 4.6 G/DL — SIGNIFICANT CHANGE UP (ref 3.3–5)
ALP SERPL-CCNC: 90 U/L — SIGNIFICANT CHANGE UP (ref 40–120)
ALT FLD-CCNC: 51 U/L — HIGH (ref 4–41)
AMPHET UR-MCNC: NEGATIVE — SIGNIFICANT CHANGE UP
ANION GAP SERPL CALC-SCNC: 20 MMOL/L — HIGH (ref 7–14)
APAP SERPL-MCNC: <10 UG/ML — LOW (ref 15–25)
APPEARANCE UR: CLEAR — SIGNIFICANT CHANGE UP
APTT BLD: 29.3 SEC — SIGNIFICANT CHANGE UP (ref 27–36.3)
AST SERPL-CCNC: 129 U/L — HIGH (ref 4–40)
BACTERIA # UR AUTO: ABNORMAL
BARBITURATES UR SCN-MCNC: NEGATIVE — SIGNIFICANT CHANGE UP
BASE EXCESS BLDV CALC-SCNC: -3.2 MMOL/L — LOW (ref -2–3)
BASE EXCESS BLDV CALC-SCNC: -7.2 MMOL/L — LOW (ref -2–3)
BASOPHILS # BLD AUTO: 0.04 K/UL — SIGNIFICANT CHANGE UP (ref 0–0.2)
BASOPHILS NFR BLD AUTO: 0.2 % — SIGNIFICANT CHANGE UP (ref 0–2)
BENZODIAZ UR-MCNC: NEGATIVE — SIGNIFICANT CHANGE UP
BILIRUB SERPL-MCNC: 0.8 MG/DL — SIGNIFICANT CHANGE UP (ref 0.2–1.2)
BILIRUB UR-MCNC: NEGATIVE — SIGNIFICANT CHANGE UP
BLOOD GAS VENOUS COMPREHENSIVE RESULT: SIGNIFICANT CHANGE UP
BLOOD GAS VENOUS COMPREHENSIVE RESULT: SIGNIFICANT CHANGE UP
BUN SERPL-MCNC: 20 MG/DL — SIGNIFICANT CHANGE UP (ref 7–23)
CALCIUM SERPL-MCNC: 9.5 MG/DL — SIGNIFICANT CHANGE UP (ref 8.4–10.5)
CHLORIDE BLDV-SCNC: 101 MMOL/L — SIGNIFICANT CHANGE UP (ref 96–108)
CHLORIDE BLDV-SCNC: 102 MMOL/L — SIGNIFICANT CHANGE UP (ref 96–108)
CHLORIDE SERPL-SCNC: 99 MMOL/L — SIGNIFICANT CHANGE UP (ref 98–107)
CK SERPL-CCNC: 8720 U/L — HIGH (ref 30–200)
CO2 BLDV-SCNC: 21.1 MMOL/L — LOW (ref 22–26)
CO2 BLDV-SCNC: 26 MMOL/L — SIGNIFICANT CHANGE UP (ref 22–26)
CO2 SERPL-SCNC: 22 MMOL/L — SIGNIFICANT CHANGE UP (ref 22–31)
COCAINE METAB.OTHER UR-MCNC: NEGATIVE — SIGNIFICANT CHANGE UP
COLOR SPEC: YELLOW — SIGNIFICANT CHANGE UP
CREAT SERPL-MCNC: 1.49 MG/DL — HIGH (ref 0.5–1.3)
CREATININE URINE RESULT, DAU: 158 MG/DL — SIGNIFICANT CHANGE UP
DIFF PNL FLD: ABNORMAL
EGFR: 56 ML/MIN/1.73M2 — LOW
EOSINOPHIL # BLD AUTO: 0.02 K/UL — SIGNIFICANT CHANGE UP (ref 0–0.5)
EOSINOPHIL NFR BLD AUTO: 0.1 % — SIGNIFICANT CHANGE UP (ref 0–6)
EPI CELLS # UR: 2 /HPF — SIGNIFICANT CHANGE UP (ref 0–5)
ETHANOL SERPL-MCNC: <10 MG/DL — SIGNIFICANT CHANGE UP
FLUAV AG NPH QL: SIGNIFICANT CHANGE UP
FLUBV AG NPH QL: SIGNIFICANT CHANGE UP
GAS PNL BLDV: 134 MMOL/L — LOW (ref 136–145)
GAS PNL BLDV: 137 MMOL/L — SIGNIFICANT CHANGE UP (ref 136–145)
GAS PNL BLDV: SIGNIFICANT CHANGE UP
GAS PNL BLDV: SIGNIFICANT CHANGE UP
GLUCOSE BLDV-MCNC: 172 MG/DL — HIGH (ref 70–99)
GLUCOSE BLDV-MCNC: 192 MG/DL — HIGH (ref 70–99)
GLUCOSE SERPL-MCNC: 190 MG/DL — HIGH (ref 70–99)
GLUCOSE UR QL: NEGATIVE — SIGNIFICANT CHANGE UP
HCO3 BLDV-SCNC: 20 MMOL/L — LOW (ref 22–29)
HCO3 BLDV-SCNC: 24 MMOL/L — SIGNIFICANT CHANGE UP (ref 22–29)
HCT VFR BLD CALC: 50 % — SIGNIFICANT CHANGE UP (ref 39–50)
HCT VFR BLDA CALC: 44 % — SIGNIFICANT CHANGE UP (ref 39–51)
HCT VFR BLDA CALC: 51 % — SIGNIFICANT CHANGE UP (ref 39–51)
HGB BLD CALC-MCNC: 14.6 G/DL — SIGNIFICANT CHANGE UP (ref 13–17)
HGB BLD CALC-MCNC: 16.9 G/DL — SIGNIFICANT CHANGE UP (ref 13–17)
HGB BLD-MCNC: 16.7 G/DL — SIGNIFICANT CHANGE UP (ref 13–17)
HYALINE CASTS # UR AUTO: 8 /LPF — HIGH (ref 0–7)
IANC: 21.48 K/UL — HIGH (ref 1.8–7.4)
IMM GRANULOCYTES NFR BLD AUTO: 0.9 % — SIGNIFICANT CHANGE UP (ref 0–0.9)
INR BLD: 1.14 RATIO — SIGNIFICANT CHANGE UP (ref 0.88–1.16)
KETONES UR-MCNC: ABNORMAL
LACTATE BLDV-MCNC: 6.6 MMOL/L — CRITICAL HIGH (ref 0.5–2)
LACTATE BLDV-MCNC: 9.2 MMOL/L — CRITICAL HIGH (ref 0.5–2)
LEUKOCYTE ESTERASE UR-ACNC: NEGATIVE — SIGNIFICANT CHANGE UP
LIDOCAIN IGE QN: 30 U/L — SIGNIFICANT CHANGE UP (ref 7–60)
LYMPHOCYTES # BLD AUTO: 0.4 K/UL — LOW (ref 1–3.3)
LYMPHOCYTES # BLD AUTO: 1.7 % — LOW (ref 13–44)
MCHC RBC-ENTMCNC: 29.5 PG — SIGNIFICANT CHANGE UP (ref 27–34)
MCHC RBC-ENTMCNC: 33.4 GM/DL — SIGNIFICANT CHANGE UP (ref 32–36)
MCV RBC AUTO: 88.2 FL — SIGNIFICANT CHANGE UP (ref 80–100)
METHADONE UR-MCNC: NEGATIVE — SIGNIFICANT CHANGE UP
MONOCYTES # BLD AUTO: 1.91 K/UL — HIGH (ref 0–0.9)
MONOCYTES NFR BLD AUTO: 7.9 % — SIGNIFICANT CHANGE UP (ref 2–14)
NEUTROPHILS # BLD AUTO: 21.48 K/UL — HIGH (ref 1.8–7.4)
NEUTROPHILS NFR BLD AUTO: 89.2 % — HIGH (ref 43–77)
NITRITE UR-MCNC: NEGATIVE — SIGNIFICANT CHANGE UP
NRBC # BLD: 0 /100 WBCS — SIGNIFICANT CHANGE UP (ref 0–0)
NRBC # FLD: 0 K/UL — SIGNIFICANT CHANGE UP (ref 0–0)
OPIATES UR-MCNC: NEGATIVE — SIGNIFICANT CHANGE UP
OXYCODONE UR-MCNC: NEGATIVE — SIGNIFICANT CHANGE UP
PCO2 BLDV: 44 MMHG — SIGNIFICANT CHANGE UP (ref 42–55)
PCO2 BLDV: 52 MMHG — SIGNIFICANT CHANGE UP (ref 42–55)
PCP SPEC-MCNC: SIGNIFICANT CHANGE UP
PCP UR-MCNC: NEGATIVE — SIGNIFICANT CHANGE UP
PH BLDV: 7.26 — LOW (ref 7.32–7.43)
PH BLDV: 7.28 — LOW (ref 7.32–7.43)
PH UR: 6 — SIGNIFICANT CHANGE UP (ref 5–8)
PLATELET # BLD AUTO: 229 K/UL — SIGNIFICANT CHANGE UP (ref 150–400)
PO2 BLDV: 24 MMHG — SIGNIFICANT CHANGE UP
PO2 BLDV: 43 MMHG — SIGNIFICANT CHANGE UP
POTASSIUM BLDV-SCNC: 4.4 MMOL/L — SIGNIFICANT CHANGE UP (ref 3.5–5.1)
POTASSIUM BLDV-SCNC: 4.8 MMOL/L — SIGNIFICANT CHANGE UP (ref 3.5–5.1)
POTASSIUM SERPL-MCNC: 4.9 MMOL/L — SIGNIFICANT CHANGE UP (ref 3.5–5.3)
POTASSIUM SERPL-SCNC: 4.9 MMOL/L — SIGNIFICANT CHANGE UP (ref 3.5–5.3)
PROT SERPL-MCNC: 8.1 G/DL — SIGNIFICANT CHANGE UP (ref 6–8.3)
PROT UR-MCNC: ABNORMAL
PROTHROM AB SERPL-ACNC: 13.3 SEC — SIGNIFICANT CHANGE UP (ref 10.5–13.4)
RBC # BLD: 5.67 M/UL — SIGNIFICANT CHANGE UP (ref 4.2–5.8)
RBC # FLD: 13.2 % — SIGNIFICANT CHANGE UP (ref 10.3–14.5)
RBC CASTS # UR COMP ASSIST: 5 /HPF — HIGH (ref 0–4)
RSV RNA NPH QL NAA+NON-PROBE: SIGNIFICANT CHANGE UP
SALICYLATES SERPL-MCNC: <0.3 MG/DL — LOW (ref 15–30)
SAO2 % BLDV: 24.8 % — SIGNIFICANT CHANGE UP
SAO2 % BLDV: 66.6 % — SIGNIFICANT CHANGE UP
SARS-COV-2 RNA SPEC QL NAA+PROBE: SIGNIFICANT CHANGE UP
SODIUM SERPL-SCNC: 141 MMOL/L — SIGNIFICANT CHANGE UP (ref 135–145)
SP GR SPEC: >1.05 (ref 1.01–1.05)
THC UR QL: NEGATIVE — SIGNIFICANT CHANGE UP
TOXICOLOGY SCREEN, DRUGS OF ABUSE, SERUM RESULT: SIGNIFICANT CHANGE UP
TROPONIN T, HIGH SENSITIVITY RESULT: 181 NG/L — CRITICAL HIGH
TROPONIN T, HIGH SENSITIVITY RESULT: 197 NG/L — CRITICAL HIGH
UROBILINOGEN FLD QL: SIGNIFICANT CHANGE UP
WBC # BLD: 24.06 K/UL — HIGH (ref 3.8–10.5)
WBC # FLD AUTO: 24.06 K/UL — HIGH (ref 3.8–10.5)
WBC UR QL: 12 /HPF — HIGH (ref 0–5)

## 2022-12-02 PROCEDURE — 73060 X-RAY EXAM OF HUMERUS: CPT | Mod: 26,LT

## 2022-12-02 PROCEDURE — 73070 X-RAY EXAM OF ELBOW: CPT | Mod: 26,LT

## 2022-12-02 PROCEDURE — 71045 X-RAY EXAM CHEST 1 VIEW: CPT | Mod: 26

## 2022-12-02 PROCEDURE — 70450 CT HEAD/BRAIN W/O DYE: CPT | Mod: 26,MA

## 2022-12-02 PROCEDURE — 31500 INSERT EMERGENCY AIRWAY: CPT

## 2022-12-02 PROCEDURE — 72125 CT NECK SPINE W/O DYE: CPT | Mod: 26,MA

## 2022-12-02 PROCEDURE — 72131 CT LUMBAR SPINE W/O DYE: CPT | Mod: 26,MA

## 2022-12-02 PROCEDURE — 74177 CT ABD & PELVIS W/CONTRAST: CPT | Mod: 26,MA

## 2022-12-02 PROCEDURE — 70486 CT MAXILLOFACIAL W/O DYE: CPT | Mod: 26,MA

## 2022-12-02 PROCEDURE — 43753 TX GASTRO INTUB W/ASP: CPT

## 2022-12-02 PROCEDURE — 72128 CT CHEST SPINE W/O DYE: CPT | Mod: 26,MA

## 2022-12-02 PROCEDURE — 99284 EMERGENCY DEPT VISIT MOD MDM: CPT | Mod: 25

## 2022-12-02 PROCEDURE — 71260 CT THORAX DX C+: CPT | Mod: 26,MA

## 2022-12-02 RX ORDER — CHLORHEXIDINE GLUCONATE 213 G/1000ML
1 SOLUTION TOPICAL
Refills: 0 | Status: DISCONTINUED | OUTPATIENT
Start: 2022-12-02 | End: 2022-12-06

## 2022-12-02 RX ORDER — PROPOFOL 10 MG/ML
100 INJECTION, EMULSION INTRAVENOUS ONCE
Refills: 0 | Status: COMPLETED | OUTPATIENT
Start: 2022-12-02 | End: 2022-12-02

## 2022-12-02 RX ORDER — FENTANYL CITRATE 50 UG/ML
1.5 INJECTION INTRAVENOUS
Qty: 2500 | Refills: 0 | Status: DISCONTINUED | OUTPATIENT
Start: 2022-12-02 | End: 2022-12-04

## 2022-12-02 RX ORDER — ASPIRIN/CALCIUM CARB/MAGNESIUM 324 MG
81 TABLET ORAL DAILY
Refills: 0 | Status: DISCONTINUED | OUTPATIENT
Start: 2022-12-02 | End: 2022-12-07

## 2022-12-02 RX ORDER — HEPARIN SODIUM 5000 [USP'U]/ML
5000 INJECTION INTRAVENOUS; SUBCUTANEOUS EVERY 8 HOURS
Refills: 0 | Status: DISCONTINUED | OUTPATIENT
Start: 2022-12-02 | End: 2022-12-03

## 2022-12-02 RX ORDER — TETANUS TOXOID, REDUCED DIPHTHERIA TOXOID AND ACELLULAR PERTUSSIS VACCINE, ADSORBED 5; 2.5; 8; 8; 2.5 [IU]/.5ML; [IU]/.5ML; UG/.5ML; UG/.5ML; UG/.5ML
0.5 SUSPENSION INTRAMUSCULAR ONCE
Refills: 0 | Status: COMPLETED | OUTPATIENT
Start: 2022-12-02 | End: 2022-12-02

## 2022-12-02 RX ORDER — PIPERACILLIN AND TAZOBACTAM 4; .5 G/20ML; G/20ML
3.38 INJECTION, POWDER, LYOPHILIZED, FOR SOLUTION INTRAVENOUS ONCE
Refills: 0 | Status: COMPLETED | OUTPATIENT
Start: 2022-12-02 | End: 2022-12-02

## 2022-12-02 RX ORDER — SUCCINYLCHOLINE CHLORIDE 100 MG/5ML
120 SYRINGE (ML) INTRAVENOUS ONCE
Refills: 0 | Status: COMPLETED | OUTPATIENT
Start: 2022-12-02 | End: 2022-12-02

## 2022-12-02 RX ORDER — SODIUM CHLORIDE 9 MG/ML
250 INJECTION INTRAMUSCULAR; INTRAVENOUS; SUBCUTANEOUS ONCE
Refills: 0 | Status: DISCONTINUED | OUTPATIENT
Start: 2022-12-02 | End: 2022-12-02

## 2022-12-02 RX ORDER — FENTANYL CITRATE 50 UG/ML
1 INJECTION INTRAVENOUS
Qty: 5000 | Refills: 0 | Status: DISCONTINUED | OUTPATIENT
Start: 2022-12-02 | End: 2022-12-02

## 2022-12-02 RX ORDER — PROPOFOL 10 MG/ML
30 INJECTION, EMULSION INTRAVENOUS
Qty: 1000 | Refills: 0 | Status: DISCONTINUED | OUTPATIENT
Start: 2022-12-02 | End: 2022-12-04

## 2022-12-02 RX ORDER — CHLORHEXIDINE GLUCONATE 213 G/1000ML
15 SOLUTION TOPICAL EVERY 12 HOURS
Refills: 0 | Status: DISCONTINUED | OUTPATIENT
Start: 2022-12-02 | End: 2022-12-04

## 2022-12-02 RX ORDER — VANCOMYCIN HCL 1 G
1000 VIAL (EA) INTRAVENOUS ONCE
Refills: 0 | Status: COMPLETED | OUTPATIENT
Start: 2022-12-02 | End: 2022-12-02

## 2022-12-02 RX ORDER — KETOROLAC TROMETHAMINE 30 MG/ML
15 SYRINGE (ML) INJECTION ONCE
Refills: 0 | Status: DISCONTINUED | OUTPATIENT
Start: 2022-12-02 | End: 2022-12-02

## 2022-12-02 RX ORDER — FENTANYL CITRATE 50 UG/ML
50 INJECTION INTRAVENOUS ONCE
Refills: 0 | Status: DISCONTINUED | OUTPATIENT
Start: 2022-12-02 | End: 2022-12-02

## 2022-12-02 RX ORDER — ETOMIDATE 2 MG/ML
20 INJECTION INTRAVENOUS ONCE
Refills: 0 | Status: COMPLETED | OUTPATIENT
Start: 2022-12-02 | End: 2022-12-02

## 2022-12-02 RX ORDER — ACETAMINOPHEN 500 MG
1000 TABLET ORAL ONCE
Refills: 0 | Status: COMPLETED | OUTPATIENT
Start: 2022-12-02 | End: 2022-12-05

## 2022-12-02 RX ADMIN — Medication 250 MILLIGRAM(S): at 21:40

## 2022-12-02 RX ADMIN — Medication 2 MILLIGRAM(S): at 20:27

## 2022-12-02 RX ADMIN — FENTANYL CITRATE 50 MICROGRAM(S): 50 INJECTION INTRAVENOUS at 20:30

## 2022-12-02 RX ADMIN — Medication 15 MILLIGRAM(S): at 23:35

## 2022-12-02 RX ADMIN — PIPERACILLIN AND TAZOBACTAM 200 GRAM(S): 4; .5 INJECTION, POWDER, LYOPHILIZED, FOR SOLUTION INTRAVENOUS at 21:05

## 2022-12-02 RX ADMIN — ETOMIDATE 20 MILLIGRAM(S): 2 INJECTION INTRAVENOUS at 20:27

## 2022-12-02 RX ADMIN — PROPOFOL 12.6 MICROGRAM(S)/KG/MIN: 10 INJECTION, EMULSION INTRAVENOUS at 21:05

## 2022-12-02 RX ADMIN — PROPOFOL 12.6 MICROGRAM(S)/KG/MIN: 10 INJECTION, EMULSION INTRAVENOUS at 20:30

## 2022-12-02 RX ADMIN — FENTANYL CITRATE 10.5 MICROGRAM(S)/KG/HR: 50 INJECTION INTRAVENOUS at 21:10

## 2022-12-02 RX ADMIN — PROPOFOL 100 MILLIGRAM(S): 10 INJECTION, EMULSION INTRAVENOUS at 20:28

## 2022-12-02 RX ADMIN — Medication 120 MILLIGRAM(S): at 20:25

## 2022-12-02 NOTE — H&P ADULT - ASSESSMENT
52-year-old male with a PMHx significant for TIAs (2011, 2013), CVAs x3 (02/2022 s/p tPA, 8/3/2022 s/p tPA, 11/5/2022 with residual expressive aphasia) on Eliquis, and HLD BIBEMS after being found unresponsive at home on the floor, last known well 12/1 at around noon.     On arrival, patient with rectal temp 105.4 F, tachy to 130s, hypertensive to 180/110, and tachypneic to 30. C-collar was placed. Patient was intubated in the ED for airway protection with etomidate and succinylcholine. Patient sedated with propofol and fentanyl. Started on vanco and zosyn. 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 CT cervical spine and maxillofacial scans negative. CT chest, abdomen, and pelvis w/ contrast concerning for possible partial SBO without transition point. Surgery consulted in ED, no acute surgical intervention at this time. CT thoracic and lumbar spine showing degenerative disc disease T6-T7 through L4-L5 and mild disc bulges at L2-L3 through L4-L5 that flatten the ventral thecal sac and narrowing of the bilateral neural foramina.    NEURO  #AMS, Intubated and Sedated now  - Patient found down and unresponsive at home on 12/2, last known well 12/1 at around noon. Baseline AOx4.  - DDx: stroke v traumatic (fall?) vs metabolic encephalopathy (sepsis?). Hypertension may be indicative of acute stroke and patient has a significant history of 3 prior CVAs this year. Patient febrile to 105.4 F on arrival.   - CTH and CT cervical spine negative for any acute pathologies. Tox screen negative. Urine appears cloudy on exam.   - Patient intubated in ED for airway protection with etomidate and succinylcholine.   - s/p vanc and zosyn, will switch to vanco and ceftriaxone with meningitis dosing   - c/w propofol and fentanyl for sedation  - c/w C-collar at this time  - Patient will need MRI to evaluate for stroke    #CVAs/TIAs  - Hx of TIAs in 2011 and 2013, CVAs x3 in 02/22, 8/22, and 11/22 with residual expressive aphasia   - on Eliquis and Aspirin at home   - c/w ASA  - start heparin 5000 Q8    CARDIAC  #HLD  - atorvastatin on hold iso partial SBO    #?ASD/PFO  - Patient reportedly found to have a PFO in February 2022 during a stroke workup. Patient presented for a PFO closure in November 2022. Notably, no PFO was found at the time and no intervention was performed.   - TTE 11/5/22 EF 57% and grossly normal LV function    #Elevated troponins  - Troponin 197 then 181    PULM  #Intubated in ED on 12/2  - Intubated for airway protection  - Trend VBGs/ABGs    RENAL  #JOSEFINA  - Cr 1.49 on presentation, previous baseline 0.9 range  - DDx: decreased PO intake x24hrs v sepsis (cloudy urine seen at bedside) v rhabdo  - Trend Cr    #Rhabdomyolysis  - CK 8720 on admission  - DDx: trauma v hyperthermia (T 105.4 F) v sepsis   - Will trend CKs    GI     # HEME/ONC    # ID    # ENDO    DVT 52-year-old male with a PMHx significant for TIAs (2011, 2013), CVAs x3 (02/2022 s/p tPA, 8/3/2022 s/p tPA, 11/5/2022 with residual expressive aphasia) on Eliquis, and HLD BIBEMS after being found unresponsive at home on the floor, last known well 12/1 at around noon.     On arrival, patient with rectal temp 105.4 F, tachy to 130s, hypertensive to 180/110, and tachypneic to 30. C-collar was placed. Patient was intubated in the ED for airway protection with etomidate and succinylcholine. Patient sedated with propofol and fentanyl. Started on vanco and zosyn. 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 CT cervical spine and maxillofacial scans negative. CT chest, abdomen, and pelvis w/ contrast concerning for possible partial SBO without transition point. Surgery consulted in ED, no acute surgical intervention at this time. CT thoracic and lumbar spine showing degenerative disc disease T6-T7 through L4-L5 and mild disc bulges at L2-L3 through L4-L5 that flatten the ventral thecal sac and narrowing of the bilateral neural foramina.    NEURO  #AMS, Intubated and Sedated now  - Patient found down and unresponsive at home on 12/2, last known well 12/1 at around noon. Baseline AOx4.  - DDx: stroke v traumatic (fall?) vs metabolic encephalopathy (sepsis?). Hypertension may be indicative of acute stroke and patient has a significant history of 3 prior CVAs this year. Patient febrile to 105.4 F on arrival.   - CTH and CT cervical spine negative for any acute pathologies. Tox screen negative. Urine appears cloudy on exam.   - Patient intubated in ED for airway protection with etomidate and succinylcholine.   - s/p vanc and zosyn, will switch to vanco and ceftriaxone with meningitis dosing   - c/w propofol and fentanyl for sedation  - c/w C-collar at this time  - Patient will need MRI to evaluate for stroke    #CVAs/TIAs  - Hx of TIAs in 2011 and 2013, CVAs x3 in 02/22, 8/22, and 11/22 with residual expressive aphasia   - on Eliquis and Aspirin at home   - c/w ASA  - start heparin 5000 Q8    #Thecal sac flattening   - CT thoracic and lumbar spine showing degenerative disc disease T6-T7 through L4-L5 and mild disc bulges at L2-L3 through L4-L5 that flatten the ventral thecal sac and narrowing of the bilateral neural foramina  - Will Monitor for now, will consider neuro/neurosurg evaluation in the future    CARDIAC  #HLD  - atorvastatin on hold iso partial SBO    #?ASD/PFO  - Patient reportedly found to have a PFO in February 2022 during a stroke workup. Patient presented for a PFO closure in November 2022. Notably, no PFO was found at the time and no intervention was performed.   - TTE 11/5/22 EF 57% and grossly normal LV function    #Elevated troponins  - Troponin 197 then 181    PULM  #Intubated in ED on 12/2  - Intubated for airway protection  - Trend VBGs/ABGs    RENAL  #JOSEFINA  - Cr 1.49 on presentation, previous baseline 0.9 range  - DDx: decreased PO intake x24hrs v sepsis (cloudy urine seen at bedside) v rhabdo  - Trend Cr    #Rhabdomyolysis  - CK 8720 on admission  - DDx: trauma v hyperthermia (T 105.4 F) v sepsis   - Will trend CKs    GI   #Partial SBO  - CT chest, abdomen, and pelvis w/ contrast concerning for possible partial SBO without transition point.  - Surgery consulted, no acute intervention at this time  - NGT placed in ED  - NPO for now with NGT to low wall suction  - Monitor for BMs, monitor for abdominal distension    HEME/ONC  #Leukocytosis  - Concern for septic process  - Trend CBCs    ID  #Concern for sepsis  - Patient presented with AMS, T 105.4, tachycardic, and tachypneic   - UA negative, follow up repeat given cloudy urine at bedside   - s/p vanco and zosyn x1 in ED  - c/w vanco, adjust by trough after 4th dose  - start ceftriaxone 2g BID given c/f meningitis   - F/u BCx x2, UCx    #Febrile  - Temp 105.4 F on admission  - will give IV acetaminophen 1g x1 now  - Trend fever curve, can consider further acetaminophen and cooling blankets as needed     ENDO  - No acute issues    DVT  - Heparin 5000 SubQ Q8 52-year-old male with a PMHx significant for TIAs (2011, 2013), CVAs x3 (02/2022 s/p tPA, 8/3/2022 s/p tPA, 11/5/2022 with residual expressive aphasia) on Eliquis, and HLD BIBEMS after being found unresponsive at home on the floor, last known well 12/1 at around noon.     On arrival, patient with rectal temp 105.4 F, tachy to 130s, hypertensive to 180/110, and tachypneic to 30. C-collar was placed. Patient was intubated in the ED for airway protection with etomidate and succinylcholine. Patient sedated with propofol and fentanyl. Started on vanco and zosyn. 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 CT cervical spine and maxillofacial scans negative. CT chest, abdomen, and pelvis w/ contrast concerning for possible partial SBO without transition point. Surgery consulted in ED, no acute surgical intervention at this time. CT thoracic and lumbar spine showing degenerative disc disease T6-T7 through L4-L5 and mild disc bulges at L2-L3 through L4-L5 that flatten the ventral thecal sac and narrowing of the bilateral neural foramina.    NEURO  #AMS, Intubated and Sedated now  - Patient found down and unresponsive at home on 12/2, last known well 12/1 at around noon. Baseline AOx4.  - DDx: stroke v traumatic (fall?) vs metabolic encephalopathy (sepsis?). Hypertension may be indicative of acute stroke and patient has a significant history of 3 prior CVAs this year. Patient febrile to 105.4 F on arrival.   - CTH and CT cervical spine negative for any acute pathologies. Tox screen negative. Urine appears cloudy on exam.   - Patient intubated in ED for airway protection with etomidate and succinylcholine.   - s/p vanc and zosyn, will switch to vanco and ceftriaxone with meningitis dosing   - c/w propofol and fentanyl for sedation  - c/w C-collar at this time  - Patient will need MRI to evaluate for stroke    #CVAs/TIAs  - Hx of TIAs in 2011 and 2013, CVAs x3 in 02/22, 8/22, and 11/22 with residual expressive aphasia   - on Eliquis and Aspirin at home   - c/w ASA  - start heparin 5000 Q8    #Thecal sac flattening   - CT thoracic and lumbar spine showing degenerative disc disease T6-T7 through L4-L5 and mild disc bulges at L2-L3 through L4-L5 that flatten the ventral thecal sac and narrowing of the bilateral neural foramina  - Will Monitor for now, will consider neuro/neurosurg evaluation in the future    CARDIAC  #HLD  - atorvastatin on hold iso partial SBO    #?ASD/PFO  - Patient reportedly found to have a PFO in February 2022 during a stroke workup. Patient presented for a PFO closure in November 2022. Notably, no PFO was found at the time and no intervention was performed.   - TTE 11/5/22 EF 57% and grossly normal LV function    #Elevated troponins  - Troponin 197 then 181    PULM  #Intubated in ED on 12/2  - Intubated for airway protection  - Trend VBGs/ABGs    RENAL  #JOSEFINA  - Cr 1.49 on presentation, previous baseline 0.9 range  - DDx: decreased PO intake x24hrs v sepsis (cloudy urine seen at bedside) v rhabdo  - Trend Cr    #Rhabdomyolysis  - CK 8720 on admission  - DDx: trauma v hyperthermia (T 105.4 F) v sepsis   - Will trend CKs    GI   #Partial SBO  - CT chest, abdomen, and pelvis w/ contrast concerning for possible partial SBO without transition point.  - Surgery consulted, no acute intervention at this time  - NGT placed in ED  - NPO for now with NGT to low wall suction  - Monitor for BMs, monitor for abdominal distension    HEME/ONC  #Leukocytosis  - Concern for septic process  - Trend CBCs    ID  #Concern for sepsis  - Patient presented with AMS, T 105.4, tachycardic, and tachypneic   - UA negative, follow up repeat given cloudy urine at bedside   - s/p vanco and zosyn x1 in ED  - c/w vanco, adjust by trough after 4th dose  - start ceftriaxone 2g BID given c/f meningitis   - F/u BCx x2, UCx, procal    #Febrile  - Temp 105.4 F on admission  - will give IV acetaminophen 1g x1 now  - Trend fever curve, can consider further acetaminophen and cooling blankets as needed     ENDO  - No acute issues    DVT  - Heparin 5000 SubQ Q8 52-year-old male with a PMHx significant for TIAs (2011, 2013), CVAs x3 (02/2022 s/p tPA, 8/3/2022 s/p tPA, 11/5/2022 with residual expressive aphasia) on Eliquis, and HLD BIBEMS after being found unresponsive at home on the floor, last known well 12/1 at around noon.     On arrival, patient with rectal temp 105.4 F, tachy to 130s, hypertensive to 180/110, and tachypneic to 30. C-collar was placed. Patient was intubated in the ED for airway protection with etomidate and succinylcholine. Patient sedated with propofol and fentanyl. Started on vanco and zosyn. 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 CT cervical spine and maxillofacial scans negative. CT chest, abdomen, and pelvis w/ contrast concerning for possible partial SBO without transition point. Surgery consulted in ED, no acute surgical intervention at this time. CT thoracic and lumbar spine showing degenerative disc disease T6-T7 through L4-L5 and mild disc bulges at L2-L3 through L4-L5 that flatten the ventral thecal sac and narrowing of the bilateral neural foramina.    NEURO  #AMS, Intubated and Sedated now  - Patient found down and unresponsive at home on 12/2, last known well 12/1 at around noon. Baseline AOx4.  - DDx: stroke v traumatic (fall?) vs metabolic encephalopathy (sepsis?). Hypertension may be indicative of acute stroke and patient has a significant history of 3 prior CVAs this year. Patient febrile to 105.4 F on arrival.   - CTH and CT cervical spine negative for any acute pathologies. Tox screen negative. Urine appears cloudy on exam.   - Patient intubated in ED for airway protection with etomidate and succinylcholine.   - s/p vanc and zosyn, will switch to vanco and ceftriaxone with meningitis dosing   - c/w propofol and fentanyl for sedation  - c/w C-collar at this time  - Patient will need MRI to evaluate for stroke  - Will obtain EEG    #CVAs/TIAs  - Hx of TIAs in 2011 and 2013, CVAs x3 in 02/22, 8/22, and 11/22 with residual expressive aphasia   - on Eliquis and Aspirin at home   - c/w ASA  - start heparin 5000 Q8    #Thecal sac flattening   - CT thoracic and lumbar spine showing degenerative disc disease T6-T7 through L4-L5 and mild disc bulges at L2-L3 through L4-L5 that flatten the ventral thecal sac and narrowing of the bilateral neural foramina  - Will Monitor for now, will consider neuro/neurosurg evaluation in the future    CARDIAC  #HLD  - atorvastatin on hold iso partial SBO    #?ASD/PFO  - Patient reportedly found to have a PFO in February 2022 during a stroke workup. Patient presented for a PFO closure in November 2022. Notably, no PFO was found at the time and no intervention was performed.   - TTE 11/5/22 EF 57% and grossly normal LV function    #Elevated troponins  - Troponin 197 then 181    PULM  #Intubated in ED on 12/2  - Intubated for airway protection  - Trend VBGs/ABGs    RENAL  #JOSEFINA  - Cr 1.49 on presentation, previous baseline 0.9 range  - DDx: decreased PO intake x24hrs v sepsis (cloudy urine seen at bedside) v rhabdo  - Trend Cr    #Rhabdomyolysis  - CK 8720 on admission  - DDx: trauma v hyperthermia (T 105.4 F) v sepsis   - Will trend CKs    GI   #Partial SBO  - CT chest, abdomen, and pelvis w/ contrast concerning for possible partial SBO without transition point.  - Surgery consulted, no acute intervention at this time  - NGT placed in ED  - NPO for now with NGT to low wall suction  - Monitor for BMs, monitor for abdominal distension    HEME/ONC  #Leukocytosis  - Concern for septic process  - Trend CBCs    ID  #Concern for sepsis  - Patient presented with AMS, T 105.4, tachycardic, and tachypneic   - UA negative, follow up repeat given cloudy urine at bedside   - s/p vanco and zosyn x1 in ED  - c/w vanco, adjust by trough after 4th dose  - start ceftriaxone 2g BID given c/f meningitis   - F/u BCx x2, UCx, procal    #Febrile  - Temp 105.4 F on admission  - will give IV acetaminophen 1g x1 now  - Trend fever curve, can consider further acetaminophen and cooling blankets as needed     ENDO  - No acute issues    DVT  - Heparin 5000 SubQ Q8 52-year-old male with a PMHx significant for TIAs (2011, 2013), CVAs x3 (02/2022 s/p tPA, 8/3/2022 s/p tPA, 11/5/2022 with residual expressive aphasia) on Eliquis, and HLD BIBEMS after being found unresponsive at home on the floor, last known well 12/1 at around noon.     On arrival, patient with rectal temp 105.4 F, tachy to 130s, hypertensive to 180/110, and tachypneic to 30. C-collar was placed. Patient was intubated in the ED for airway protection with etomidate and succinylcholine. Patient sedated with propofol and fentanyl. Started on vanco and zosyn. 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 CT cervical spine and maxillofacial scans negative. CT chest, abdomen, and pelvis w/ contrast concerning for possible partial SBO without transition point. Surgery consulted in ED, no acute surgical intervention at this time. CT thoracic and lumbar spine showing degenerative disc disease T6-T7 through L4-L5 and mild disc bulges at L2-L3 through L4-L5 that flatten the ventral thecal sac and narrowing of the bilateral neural foramina.    NEURO  #AMS, Intubated and Sedated now  - Patient found down and unresponsive at home on 12/2, last known well 12/1 at around noon. Baseline AOx4.  - DDx: stroke v traumatic (fall?) vs metabolic encephalopathy (sepsis?). Hypertension may be indicative of acute stroke and patient has a significant history of 3 prior CVAs this year. Patient febrile to 105.4 F on arrival.   - CTH and CT cervical spine negative for any acute pathologies. Tox screen negative. Urine appears cloudy on exam.   - Patient intubated in ED for airway protection with etomidate and succinylcholine.   - s/p vanc and zosyn, will switch to vanco and ceftriaxone with meningitis dosing   - c/w propofol and fentanyl for sedation  - c/w C-collar at this time  - Patient will need MRI to evaluate for stroke  - Will obtain EEG    #CVAs/TIAs  - Hx of TIAs in 2011 and 2013, CVAs x3 in 02/22, 8/22, and 11/22 with residual expressive aphasia   - on Eliquis and Aspirin at home   - c/w ASA  - start heparin 5000 Q8    #Thecal sac flattening   - CT thoracic and lumbar spine showing degenerative disc disease T6-T7 through L4-L5 and mild disc bulges at L2-L3 through L4-L5 that flatten the ventral thecal sac and narrowing of the bilateral neural foramina  - Will Monitor for now, will consider neuro/neurosurg evaluation in the future    CARDIAC  #HLD  - atorvastatin on hold iso partial SBO    #?ASD/PFO  - Patient reportedly found to have a PFO in February 2022 during a stroke workup. Patient presented for a PFO closure in November 2022. Notably, no PFO was found at the time and no intervention was performed.   - TTE 11/5/22 EF 57% and grossly normal LV function    #Elevated troponins  - Troponin 197 then 181    PULM  #Intubated in ED on 12/2  - Intubated for airway protection  - Trend VBGs/ABGs    RENAL  #JOSEFINA  - Cr 1.49 on presentation, previous baseline 0.9 range  - DDx: decreased PO intake x24hrs v sepsis (cloudy urine seen at bedside) v rhabdo  - Trend Cr    #Rhabdomyolysis  - CK 8720 on admission  - DDx: trauma v hyperthermia (T 105.4 F) v sepsis   - Will trend CKs    GI   #Partial SBO  - CT chest, abdomen, and pelvis w/ contrast concerning for possible partial SBO without transition point.  - Surgery consulted, no acute intervention at this time  - OGT placed in ED  - NPO for now with OGt to low wall suction  - Monitor for BMs, monitor for abdominal distension    HEME/ONC  #Leukocytosis  - Concern for septic process  - Trend CBCs    ID  #Concern for sepsis  - Patient presented with AMS, T 105.4, tachycardic, and tachypneic   - UA negative, follow up repeat given cloudy urine at bedside   - s/p vanco and zosyn x1 in ED  - c/w vanco, adjust by trough after 4th dose  - start ceftriaxone 2g BID given c/f meningitis   - F/u BCx x2, UCx, procal    #Febrile  - Temp 105.4 F on admission  - will give IV acetaminophen 1g x1 now  - Trend fever curve, can consider further acetaminophen and cooling blankets as needed     ENDO  - No acute issues    DVT  - Heparin 5000 SubQ Q8

## 2022-12-02 NOTE — ED PROVIDER NOTE - PROGRESS NOTE DETAILS
[facility] consulted. pending surgery call back Spoke with surgery reviewed CT with radiology unlikely at transition point.  No surgical intervention at this time we will continue to monitor to agree with NG tube at this time.  EKG sinus tachycardia at 123 with no ST elevations or depressions pending repeat troponin [facility] DO PGY-3: cultures ordered after abx given.

## 2022-12-02 NOTE — H&P ADULT - ATTENDING COMMENTS
52-year-old male with a PMHx significant for TIAs (2011, 2013), CVAs x3 (02/2022 s/p tPA, 8/3/2022 s/p tPA, 11/5/2022 with residual expressive aphasia) on Eliquis, and HLD BIBEMS after being found unresponsive at home on the floor, last known well 12/1 at around noon.   unclear etiology   pan Ct scan, cervical collar r/o underlying bleed or trauma given multiple abrasions and ecchymosis  possible CVA, consider repeat ctscan in 48 hours and MRI  possible seizure (possibly given h/o CVAs), elevated lactate and rhabdo (lactate is clearing)  EEG to r/o seizure, consider neuro eval   intubated sedated  check post intubation ABG, wean Fio2 as tolerated  possible infectious etiology, febrile elevated procalcitonin, pancx Abx unable to LP given possible recent Eliquis use  possible UTI, purulent drainage s/p fish, no consolidations noted on Ct scan  MRSA and legionella An   vanco, cetriaxone azithro   rhabo, trend CPK, LR bolus then maintenance  possible SBO, OGT suction, bowel rest surgery follow up  patient not in shock, not on vasopressor support  POCUS RV<LV, preserved LV ffunction, A line predominant, no consolidations  DVT ppx heparin sq  Full code  will attempt to get more collateral, parents coming in from [place] 52-year-old male with a PMHx significant for TIAs (2011, 2013), CVAs x3 (02/2022 s/p tPA, 8/3/2022 s/p tPA, 11/5/2022 with residual expressive aphasia) on Eliquis, and HLD BIBEMS after being found unresponsive at home on the floor, last known well 12/1 at around noon.   unclear etiology   pan Ct scan, cervical collar r/o underlying bleed or trauma given multiple abrasions and ecchymosis, will give Tdap   possible CVA, consider repeat ctscan in 48 hours and MRI  possible seizure (possibly given h/o CVAs), elevated lactate and rhabdo (lactate is clearing)  EEG to r/o seizure, consider neuro eval   intubated sedated  check post intubation ABG, wean Fio2 as tolerated  possible infectious etiology, febrile elevated procalcitonin, pancx Abx unable to LP given possible recent Eliquis use  possible UTI, purulent drainage s/p fish, no consolidations noted on Ct scan  MRSA and legionella An   vanco, cetriaxone azithro   rhabo, trend CPK, LR bolus then maintenance  possible SBO, OGT suction, bowel rest surgery follow up  patient not in shock, not on vasopressor support  POCUS RV<LV, preserved LV function, A line predominant, no consolidations  DVT ppx heparin sq  Full code  will attempt to get more collateral, parents coming in from [place] 52-year-old male with a PMHx significant for TIAs (2011, 2013), CVAs x3 (02/2022 s/p tPA, 8/3/2022 s/p tPA, 11/5/2022 with residual expressive aphasia) on Eliquis, and HLD BIBEMS after being found unresponsive at home on the floor, last known well 12/1 at around noon.   unclear etiology   pan Ct scan, cervical collar r/o underlying bleed or trauma given multiple abrasions and ecchymosis, will give Tdap   possible CVA, consider repeat ctscan in 48 hours and MRI  possible seizure (possibly given h/o CVAs), elevated lactate and rhabdo (lactate is clearing)  EEG to r/o seizure, consider neuro eval   intubated sedated  check post intubation ABG, wean Fio2 as tolerated  possible infectious etiology, febrile elevated procalcitonin, pancx Abx unable to LP given possible recent Eliquis use  possible UTI, purulent drainage s/p fish, no consolidations noted on Ct scan  MRSA and legionella An   vanco, cetriaxone azithro   rhabo, trend CPK, LR bolus then maintenance  possible SBO, OGT suction, bowel rest surgery follow up  patient not in shock, not on vasopressor support  Utox, serum tox  POCUS RV<LV, preserved LV function, A line predominant, no consolidations  DVT ppx heparin sq  Full code  will attempt to get more collateral, parents coming in from [place]

## 2022-12-02 NOTE — ED PROVIDER NOTE - OBJECTIVE STATEMENT
Dr Caban  52-year-old male history of a prior stroke with no residual on Eliquis, high cholesterol brought in by EMS found unresponsive at home.  Spoke to friend state and neighbor spoke to him yesterday afternoon and that was the last time anyone part of him today they went to check on him and they found him between the bed and the dresser unresponsive.  EMS states he has been grunting minimally responsive.  Arrives to the ER covered in dried blood in mouth multiple ecchymosis, left side of the face neck chest arm.  Friend denies hx of ETOH or drug use.  Vital signs noted patient hypertensive parable 205 tachycardic to 130 fingerstick 160

## 2022-12-02 NOTE — ED ADULT NURSE NOTE - OBJECTIVE STATEMENT
LANCE RN: Pt. is a 53 yo M BIBEMS unresponsive. LKN 12pm yesterday. Pt. found by family friend on the floor. Pmhx CVA, cardiac cath 11/22. Large bruising and contusions to face, lt. arm, lt. leg, pelvis and back of lt. leg. Pt. intubated on arrival (7.5 tube 25 @lipline). Placed on cardiac monitor and ZOL- Sinus tachycardia. 14Fr fish inserted w/ purulent drainage noted in drainage bag. Pt. febrile > 105, placed on cooling blanket. Arrives w/ #18g PIV to Lt. hand. Established #18g PIV to LFA, #20g PIV to RAC, #20g PIV to RFA. Labs sent. Medicated per MD orders. Taken to CT urgently. Comfort measures provided, safety measures implemented, MD and RN at bedside.

## 2022-12-02 NOTE — CONSULT NOTE ADULT - SUBJECTIVE AND OBJECTIVE BOX
General Surgery Consult      HPI: 52-year-old male history of a prior stroke with no residual on Eliquis, high cholesterol brought in by EMS found unresponsive at home. General surgery called for incidental finding of pSBO on CT. Per pt's neighbors he was fine yesterday and today they found him between the bed and the dresser unresponsive.  EMS states he has been grunting minimally responsive.  Arrives to the ER covered in dried blood in mouth multiple ecchymosis, left side of the face neck chest arm.  Friend denies hx of ETOH or drug use. Pt's family in [place].     In ED, WBC 24, urine looks purulent and lactate is 9.2, troponin 197. CT abd suggestive of a partial SBO.       PAST MEDICAL HISTORY:  History of TIAs    CVA (cerebrovascular accident)    HLD (hyperlipidemia)    Vertigo        PAST SURGICAL HISTORY:  No significant past surgical history        MEDICATIONS:  acetaminophen   IVPB .. 1000 milliGRAM(s) IV Intermittent once  chlorhexidine 0.12% Liquid 15 milliLiter(s) Oral Mucosa every 12 hours  fentaNYL   Infusion. 1.5 MICROgram(s)/kG/Hr IV Continuous <Continuous>  ketorolac   Injectable 15 milliGRAM(s) IV Push Once  propofol Infusion 30 MICROgram(s)/kG/Min IV Continuous <Continuous>      ALLERGIES:  No Known Allergies      VITALS & I/Os:  Vital Signs Last 24 Hrs  T(C): --  T(F): --  HR: 130 (02 Dec 2022 19:44) (130 - 130)  BP: 180/110 (02 Dec 2022 19:44) (180/110 - 180/110)  BP(mean): --  RR: 30 (02 Dec 2022 19:44) (30 - 30)  SpO2: --    Parameters below as of 02 Dec 2022 19:44  Patient On (Oxygen Delivery Method): mask, nonrebreather  O2 Flow (L/min): 15      I&O's Summary      PHYSICAL EXAM:  General: sedated  Respiratory: intubated   Cardiovascular: tachy  Abdominal: Soft, nondistended, not peritoneal.   Extremities: Warm    LABS:                        16.7   24.06 )-----------( 229      ( 02 Dec 2022 20:00 )             50.0     12-02    141  |  99  |  20  ----------------------------<  190<H>  4.9   |  22  |  1.49<H>    Ca    9.5      02 Dec 2022 20:10    TPro  8.1  /  Alb  4.6  /  TBili  0.8  /  DBili  x   /  AST  129<H>  /  ALT  51<H>  /  AlkPhos  90  12-02    Lactate:  12-02 @ 21:30  6.6  12-02 @ 20:00  9.2    PT/INR - ( 02 Dec 2022 20:00 )   PT: 13.3 sec;   INR: 1.14 ratio         PTT - ( 02 Dec 2022 20:00 )  PTT:29.3 sec    CARDIAC MARKERS ( 02 Dec 2022 20:10 )  x     / x     / 8720 U/L / x     / x            Urinalysis Basic - ( 02 Dec 2022 21:30 )    Color: Yellow / Appearance: Clear / SG: >1.050 / pH: x  Gluc: x / Ketone: Small  / Bili: Negative / Urobili: <2 mg/dL   Blood: x / Protein: 100 mg/dL / Nitrite: Negative   Leuk Esterase: Negative / RBC: 5 /HPF / WBC 12 /HPF   Sq Epi: x / Non Sq Epi: 2 /HPF / Bacteria: Few        < from: CT Abdomen and Pelvis w/ IV Cont (12.02.22 @ 20:28) >  IMPRESSION:  No acute traumatic pathology within the chest, abdomen or pelvis.    Moderate gastric distention. No gastric mass. No joseph gastric outlet   obstruction.    Mildly dilated loops of small bowel with possible transition point in the   mid abdomen (604:53). Partial or incomplete obstruction could have this   appearance. No high-grade bowel obstruction. Further evaluation and   follow-up as clinically indicated.      No pneumatosis or portal venous gas to suggest intestinal ischemia.    < end of copied text >

## 2022-12-02 NOTE — CONSULT NOTE ADULT - ASSESSMENT
52-year-old male history of a prior stroke with no residual on Eliquis, high cholesterol brought in by EMS found unresponsive at home. General surgery called for incidental finding of pSBO on CT.    - No acute surgical intervention   - Reviewed imaging with radiology, very low likelihood of SBO  - Recommend NGT, NPO and IV hydration   - trend lactate   - Global care per primary     D/w Dr. Apryl STUART team [id number]

## 2022-12-02 NOTE — PATIENT PROFILE ADULT - FUNCTIONAL ASSESSMENT - BASIC MOBILITY 6.
4-calculated by average/Not able to assess (calculate score using [facility] averaging method)

## 2022-12-02 NOTE — H&P ADULT - NSHPSOCIALHISTORY_GEN_ALL_CORE
Social drinker but very rarely. Lives alone, not , no children. No substance use. Social drinker but very rarely. Lives alone, not , no children. No substance use. Parents live in [place]. Works as a /teacher.

## 2022-12-02 NOTE — H&P ADULT - HISTORY OF PRESENT ILLNESS
52-year-old male with a PMHx significant for TIAs (2011, 2013), CVAs x3 (02/2022 s/p tPA, 8/3/2022 s/p tPA, 11/5/2022 with residual expressive aphasia) on Eliquis, and HLD BIBEMS after being found unresponsive at home. Per patient's 2 friends in the ED, patient was last spoken to around noon on 12/1. No one had heard from him or seen him since yesterday, was not responding to calls this am, so they went to check on him today and they found him unresponsive at home with head on floor turned to the side and one leg was on the bed. Patient was snoring per his friends. Friends called EMS. EMS states he was grunting but minimally responsive. Patient arrived to the ER covered in dried blood in his mouth with multiple ecchymosis on the left side of his face, neck,, chest, and arm. Patient obtunded. Friend denies hx of ETOH or drug use. Of note, patient had a questionable ASD/PFO with possible closure on 11/22/22; per patient's friends no PFO was found and no closure was performed.     On arrival, patient with rectal temp 105.4 F, tachy to 130s, hypertensive to 180/110, and tachypneic to 30. C-collar was placed. Patient was intubated in the ED for airway protection with etomidate and succinylcholine. Patient sedated with propofol and fentanyl. Started on vanco and zosyn. 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 CT cervical spine and fmaxillofacial scans negative. CT chest, abdomen, and pelvis w/ contrast concerning for possible partial SBO without transition point. Surgery consulted in ED, no acute surgical intervention at this time. CT thoracic and lumbar spine showing degenerative disc disease T6-T7 through L4-L5 and mild disc bulges at L2-L3 through L4-L5 that flatten the ventral thecal sac and narrow the bilateral foramina.   52-year-old male with a PMHx significant for TIAs (2011, 2013), CVAs x3 (02/2022 s/p tPA, 8/3/2022 s/p tPA, 11/5/2022 with residual expressive aphasia) on Eliquis, and HLD BIBEMS after being found unresponsive at home. Per patient's 2 friends in the ED, patient was last spoken to around noon on 12/1. No one had heard from him or seen him since yesterday, was not responding to calls this am, so they went to check on him today and they found him unresponsive at home with head on floor turned to the side and one leg was on the bed. Patient was snoring per his friends. Friends called EMS. EMS states he was grunting but minimally responsive. Patient arrived to the ER covered in dried blood in his mouth with multiple ecchymosis on the left side of his face, neck,, chest, and arm. Patient obtunded. Friend denies hx of ETOH or drug use. Of note, patient had a questionable ASD/PFO with possible closure on 11/22/22; per patient's friends no PFO was found and no closure was performed.  Per outpatient records, patient previously on testosterone transdermal solution which was stopped on 11/15 by his urologist. Per patient's friends at bedside, patient was recently prescribed a mail-order testosterone replacement, unsure of the name and unsure if he received and started the medication. At baseline, patient is AOx4, able to ambulate without assistance, and takes care of all ADLs without assistance.     On arrival, patient with rectal temp 105.4 F, tachy to 130s, hypertensive to 180/110, and tachypneic to 30. C-collar was placed. Patient was intubated in the ED for airway protection with etomidate and succinylcholine. Patient sedated with propofol and fentanyl. Started on vanco and zosyn. 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 CT cervical spine and maxillofacial scans negative. CT chest, abdomen, and pelvis w/ contrast concerning for possible partial SBO without transition point. Surgery consulted in ED, no acute surgical intervention at this time. CT thoracic and lumbar spine showing degenerative disc disease T6-T7 through L4-L5 and mild disc bulges at L2-L3 through L4-L5 that flatten the ventral thecal sac and narrowing of the bilateral neural foramina.

## 2022-12-02 NOTE — ED PROCEDURE NOTE - ATTENDING CONTRIBUTION TO CARE
Attending Statement: I have personally seen and examined this patient. I have fully participated in the care of this patient. I have reviewed all pertinent clinical information, including history physical exam, plan and the Resident's note and agree except as noted
Attending Statement: I have personally seen and examined this patient. I have fully participated in the care of this patient. I have reviewed all pertinent clinical information, including history physical exam, plan and the Resident's note and agree except as noted

## 2022-12-02 NOTE — ED PROVIDER NOTE - PHYSICAL EXAMINATION
Dr Caban  Nonverbal, grimacing and moaning.  Dried blood around the mouth.    Ecchymosis of the left side of the face down to the neck, c-collar applied in the emergency room.    Ecchymosis along left side of the chest around the back no crepitus no palpable deformity pulse ox 98 00% on room air soft nondistended abdomen with ecchymosis on the left flank.  Stable pelvis.  No blood in the urethra noted.    Ecchymosis along the right anterior thigh.  Ecchymosis and swelling of the left arm no break in the skin.

## 2022-12-02 NOTE — ED PROVIDER NOTE - CLINICAL SUMMARY MEDICAL DECISION MAKING FREE TEXT BOX
Plan emergently intubated for airway protection given  pt obtunded, EKG , sepsis labs, tox screen, CT head neck face chest abdomen pelvis trauma screening, x-rays of the left arm pelvis and lower extremities.  IV antibiotics.  Propofol for sedation.  To be admitted

## 2022-12-02 NOTE — H&P ADULT - NSHPLABSRESULTS_GEN_ALL_CORE
16.7   24.06 )-----------( 229      ( 02 Dec 2022 20:00 )             50.0   12-02    141  |  99  |  20  ----------------------------<  190<H>  4.9   |  22  |  1.49<H>    Ca    9.5      02 Dec 2022 20:10    TPro  8.1  /  Alb  4.6  /  TBili  0.8  /  DBili  x   /  AST  129<H>  /  ALT  51<H>  /  AlkPhos  90  12-02      PT/INR - ( 02 Dec 2022 20:00 )   PT: 13.3 sec;   INR: 1.14 ratio    PTT - ( 02 Dec 2022 20:00 )  PTT:29.3 sec                Urinalysis Basic - ( 02 Dec 2022 21:30 )  Color: Yellow / Appearance: Clear / SG: >1.050 / pH: x  Gluc: x / Ketone: Small  / Bili: Negative / Urobili: <2 mg/dL   Blood: x / Protein: 100 mg/dL / Nitrite: Negative   Leuk Esterase: Negative / RBC: 5 /HPF / WBC 12 /HPF   Sq Epi: x / Non Sq Epi: 2 /HPF / Bacteria: Few    CAPILLARY BLOOD GLUCOSE  POCT Blood Glucose.: 140 mg/dL (02 Dec 2022 19:51)    Blood Gas Venous - Lactate: 6.6 mmol/L (12.02.22 @ 21:30)     Blood Gas Profile - Venous (12.02.22 @ 21:30)   pH, Venous: 7.26   pCO2, Venous: 44 mmHg   pO2, Venous: 43 mmHg   HCO3, Venous: 20 mmol/L   Base Excess, Venous: -7.2 mmol/L   Oxygen Saturation, Venous: 66.6 %   Total CO2, Venous: 21.1 mmol/L   Blood Gas Source Venous: Venous     Blood Gas Profile - Venous (12.02.22 @ 20:00)   pH, Venous: 7.28   pCO2, Venous: 52 mmHg   pO2, Venous: 24 mmHg   HCO3, Venous: 24 mmol/L   Base Excess, Venous: -3.2 mmol/L   Oxygen Saturation, Venous: 24.8 %   Total CO2, Venous: 26.0 mmol/L   Blood Gas Source Venous: Venous    Xray Chest 1 View AP/PA (12.02.22 @ 20:38)  IMPRESSION: No acute traumatic findings.    CT Abdomen and Pelvis w/ IV Cont (12.02.22 @ 20:28)  IMPRESSION:  No acute traumatic pathology within the chest, abdomen or pelvis.  Moderate gastric distention. No gastric mass. No joseph gastric outlet   obstruction.  Mildly dilated loops of small bowel with possible transition point in the   mid abdomen (604:53). Partial or incomplete obstruction could have this   appearance. No high-grade bowel obstruction. Further evaluation and   follow-up as clinically indicated.  No pneumatosis or portal venous gas to suggest intestinal ischemia.    CT Maxillofacial No Cont (12.02.22 @ 20:27)  IMPRESSION:  CT HEAD: No acute abnormality.  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  CT CERVICAL SPINE: No acute abnormality  CT FACIAL BONE: No acute abnormality    CT Lumbar Spine No Cont (12.02.22 @ 20:27)  IMPRESSION:  Degenerative disc disease and spondylosis is noted at T6-T7   through L4-5 with loss of disc height and associated degenerative   endplate changes. Mild disc bulges at L2-3 through L4-5 flatten the   ventral thecal sac and narrow the BILATERAL neural foramina   No   vertebral fracture is recognized. 13.9   15.63 )-----------( 163      ( 03 Dec 2022 01:00 )             42.2     12-03    135  |  104  |  22  ----------------------------<  140<H>  4.7   |  20<L>  |  1.41<H>    Ca    7.4<L>      03 Dec 2022 01:00  Phos  4.6     12-03  Mg     2.10     12-03    TPro  6.2  /  Alb  3.4  /  TBili  0.5  /  DBili  x   /  AST  191<H>  /  ALT  58<H>  /  AlkPhos  62  12-03    16.7   24.06 )-----------( 229      ( 02 Dec 2022 20:00 )             50.0   12-02    141  |  99  |  20  ----------------------------<  190<H>  4.9   |  22  |  1.49<H>    Ca    9.5      02 Dec 2022 20:10    TPro  8.1  /  Alb  4.6  /  TBili  0.8  /  DBili  x   /  AST  129<H>  /  ALT  51<H>  /  AlkPhos  90  12-02      PT/INR - ( 02 Dec 2022 20:00 )   PT: 13.3 sec;   INR: 1.14 ratio    PTT - ( 02 Dec 2022 20:00 )  PTT:29.3 sec                Urinalysis Basic - ( 02 Dec 2022 21:30 )  Color: Yellow / Appearance: Clear / SG: >1.050 / pH: x  Gluc: x / Ketone: Small  / Bili: Negative / Urobili: <2 mg/dL   Blood: x / Protein: 100 mg/dL / Nitrite: Negative   Leuk Esterase: Negative / RBC: 5 /HPF / WBC 12 /HPF   Sq Epi: x / Non Sq Epi: 2 /HPF / Bacteria: Few    CAPILLARY BLOOD GLUCOSE  POCT Blood Glucose.: 140 mg/dL (02 Dec 2022 19:51)    Blood Gas Venous - Lactate: 6.6 mmol/L (12.02.22 @ 21:30)     Blood Gas Profile - Venous (12.02.22 @ 21:30)   pH, Venous: 7.26   pCO2, Venous: 44 mmHg   pO2, Venous: 43 mmHg   HCO3, Venous: 20 mmol/L   Base Excess, Venous: -7.2 mmol/L   Oxygen Saturation, Venous: 66.6 %   Total CO2, Venous: 21.1 mmol/L   Blood Gas Source Venous: Venous     Blood Gas Profile - Venous (12.02.22 @ 20:00)   pH, Venous: 7.28   pCO2, Venous: 52 mmHg   pO2, Venous: 24 mmHg   HCO3, Venous: 24 mmol/L   Base Excess, Venous: -3.2 mmol/L   Oxygen Saturation, Venous: 24.8 %   Total CO2, Venous: 26.0 mmol/L   Blood Gas Source Venous: Venous    Xray Chest 1 View AP/PA (12.02.22 @ 20:38)  IMPRESSION: No acute traumatic findings.    CT Abdomen and Pelvis w/ IV Cont (12.02.22 @ 20:28)  IMPRESSION:  No acute traumatic pathology within the chest, abdomen or pelvis.  Moderate gastric distention. No gastric mass. No joseph gastric outlet   obstruction.  Mildly dilated loops of small bowel with possible transition point in the   mid abdomen (604:53). Partial or incomplete obstruction could have this   appearance. No high-grade bowel obstruction. Further evaluation and   follow-up as clinically indicated.  No pneumatosis or portal venous gas to suggest intestinal ischemia.    CT Maxillofacial No Cont (12.02.22 @ 20:27)  IMPRESSION:  CT HEAD: No acute abnormality.  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  CT CERVICAL SPINE: No acute abnormality  CT FACIAL BONE: No acute abnormality    CT Lumbar Spine No Cont (12.02.22 @ 20:27)  IMPRESSION:  Degenerative disc disease and spondylosis is noted at T6-T7   through L4-5 with loss of disc height and associated degenerative   endplate changes. Mild disc bulges at L2-3 through L4-5 flatten the   ventral thecal sac and narrow the BILATERAL neural foramina   No   vertebral fracture is recognized.

## 2022-12-02 NOTE — ED ADULT NURSE NOTE - NS ED NURSE REPORT GIVEN TO FT
[FreeTextEntry1] : 12 years old with recent travel to [facility]\par will check stool culture and ova and parasite\par dad has given him antibiotics already so yield on culture is unknown\par meanwhile he needs to take BRAT diet and drink lot of water and pedialyte
MARK Giuseppe

## 2022-12-02 NOTE — H&P ADULT - NSHPPHYSICALEXAM_GEN_ALL_CORE
General: sedated and intubated  Eyes: conjunctiva and sclera clear  HEENT: NCAT, c-collar in place  Respiratory: No chest wall deformity, CTABL, intubated, ecchymosis on chest  Cardiovascular: RRR, +S1/S2, no murmurs, rubs, or gallops  Abdominal: Normal BS, soft, non-distended  Extremities: No cyanosis, clubbing or pitting edema   Neurological: sedated, intubated, unable to assess  Skin: WWP. Ecchymosis on left shoulder, neck, face, arm, and chest General: sedated and intubated  Eyes: conjunctiva and sclera clear  HEENT: NCAT, c-collar in place  Respiratory: No chest wall deformity, CTABL, intubated, ecchymosis on chest  Cardiovascular: RRR, +S1/S2, no murmurs, rubs, or gallops  Abdominal: Normal BS, soft, non-distended  Extremities: No cyanosis, clubbing or pitting edema   Neurological: sedated, intubated, unable to assess  Skin: WWP. Ecchymosis and abrasions on right medial thigh, L knee, L shoulder and arm, R wrist, hand and knuckles, L forearm and hand, L lateral chest; abrasion and dried blood around mouth; edema around eyes bilaterally General: sedated and intubated  Eyes: conjunctiva and sclera clear  HEENT: NCAT, c-collar in place, OGT in place  Respiratory: No chest wall deformity, CTABL, intubated, ecchymosis on chest  Cardiovascular: RRR, +S1/S2, no murmurs, rubs, or gallops  Abdominal: Normal BS, soft, non-distended  Extremities: No cyanosis, clubbing or pitting edema   Neurological: sedated, intubated, unable to assess  Skin: WWP. Ecchymosis and abrasions on right medial thigh, L knee, L shoulder and arm, R wrist, hand and knuckles, L forearm and hand, L lateral chest; abrasion and dried blood around mouth; edema around eyes bilaterally ICU Vital Signs Last 24 Hrs  T(C): 37.8 (03 Dec 2022 04:00), Max: 40.8 (02 Dec 2022 19:44)  T(F): 100 (03 Dec 2022 04:00), Max: 105.4 (02 Dec 2022 19:44)  HR: 68 (03 Dec 2022 05:00) (68 - 136)  BP: 94/70 (03 Dec 2022 05:00) (78/67 - 180/110)  BP(mean): 71 (03 Dec 2022 05:00) (70 - 135)  RR: 16 (03 Dec 2022 05:00) (16 - 30)  SpO2: 100% (03 Dec 2022 05:00) (100% - 100%)    O2 Parameters below as of 03 Dec 2022 05:00  Patient On (Oxygen Delivery Method): ventilator    O2 Concentration (%): 30    General: sedated and intubated  Eyes: conjunctiva and sclera clear  HEENT: NCAT, c-collar in place, OGT in place  Respiratory: No chest wall deformity, CTABL, intubated, ecchymosis on chest  Cardiovascular: RRR, +S1/S2, no murmurs, rubs, or gallops  Abdominal: Normal BS, soft, non-distended  Extremities: No cyanosis, clubbing or pitting edema   Neurological: sedated, intubated, unable to assess  Skin: WWP. Ecchymosis and abrasions on right medial thigh, L knee, L shoulder and arm, R wrist, hand and knuckles, L forearm and hand, L lateral chest; abrasion and dried blood around mouth; edema around eyes bilaterally

## 2022-12-02 NOTE — ED ADULT NURSE NOTE - NSIMPLEMENTINTERV_GEN_ALL_ED
Implemented All Fall with Harm Risk Interventions:  [place] to call system. Call bell, personal items and telephone within reach. Instruct patient to call for assistance. Room bathroom lighting operational. Non-slip footwear when patient is off stretcher. Physically safe environment: no spills, clutter or unnecessary equipment. Stretcher in lowest position, wheels locked, appropriate side rails in place. Provide visual cue, wrist band, yellow gown, etc. Monitor gait and stability. Monitor for mental status changes and reorient to person, place, and time. Review medications for side effects contributing to fall risk. Reinforce activity limits and safety measures with patient and family. Provide visual clues: red socks.

## 2022-12-03 LAB
ALBUMIN SERPL ELPH-MCNC: 2.7 G/DL — LOW (ref 3.3–5)
ALBUMIN SERPL ELPH-MCNC: 2.9 G/DL — LOW (ref 3.3–5)
ALBUMIN SERPL ELPH-MCNC: 3.4 G/DL — SIGNIFICANT CHANGE UP (ref 3.3–5)
ALP SERPL-CCNC: 51 U/L — SIGNIFICANT CHANGE UP (ref 40–120)
ALP SERPL-CCNC: 54 U/L — SIGNIFICANT CHANGE UP (ref 40–120)
ALP SERPL-CCNC: 62 U/L — SIGNIFICANT CHANGE UP (ref 40–120)
ALT FLD-CCNC: 58 U/L — HIGH (ref 4–41)
ALT FLD-CCNC: 60 U/L — HIGH (ref 4–41)
ALT FLD-CCNC: 68 U/L — HIGH (ref 4–41)
ANION GAP SERPL CALC-SCNC: 11 MMOL/L — SIGNIFICANT CHANGE UP (ref 7–14)
ANION GAP SERPL CALC-SCNC: 11 MMOL/L — SIGNIFICANT CHANGE UP (ref 7–14)
ANION GAP SERPL CALC-SCNC: 9 MMOL/L — SIGNIFICANT CHANGE UP (ref 7–14)
APPEARANCE UR: ABNORMAL
APTT BLD: 129.1 SEC — SIGNIFICANT CHANGE UP (ref 27–36.3)
AST SERPL-CCNC: 191 U/L — HIGH (ref 4–40)
AST SERPL-CCNC: 201 U/L — HIGH (ref 4–40)
AST SERPL-CCNC: 223 U/L — HIGH (ref 4–40)
BACTERIA # UR AUTO: ABNORMAL
BASE EXCESS BLDV CALC-SCNC: -6.6 MMOL/L — LOW (ref -2–3)
BASOPHILS # BLD AUTO: 0 K/UL — SIGNIFICANT CHANGE UP (ref 0–0.2)
BASOPHILS # BLD AUTO: 0.04 K/UL — SIGNIFICANT CHANGE UP (ref 0–0.2)
BASOPHILS NFR BLD AUTO: 0 % — SIGNIFICANT CHANGE UP (ref 0–2)
BASOPHILS NFR BLD AUTO: 0.3 % — SIGNIFICANT CHANGE UP (ref 0–2)
BILIRUB SERPL-MCNC: 0.5 MG/DL — SIGNIFICANT CHANGE UP (ref 0.2–1.2)
BILIRUB SERPL-MCNC: 0.6 MG/DL — SIGNIFICANT CHANGE UP (ref 0.2–1.2)
BILIRUB SERPL-MCNC: 0.8 MG/DL — SIGNIFICANT CHANGE UP (ref 0.2–1.2)
BILIRUB UR-MCNC: NEGATIVE — SIGNIFICANT CHANGE UP
BLOOD GAS ARTERIAL - LYTES,HGB,ICA,LACT RESULT: SIGNIFICANT CHANGE UP
BLOOD GAS ARTERIAL COMPREHENSIVE RESULT: SIGNIFICANT CHANGE UP
BLOOD GAS VENOUS COMPREHENSIVE RESULT: SIGNIFICANT CHANGE UP
BUN SERPL-MCNC: 21 MG/DL — SIGNIFICANT CHANGE UP (ref 7–23)
BUN SERPL-MCNC: 22 MG/DL — SIGNIFICANT CHANGE UP (ref 7–23)
BUN SERPL-MCNC: 25 MG/DL — HIGH (ref 7–23)
CALCIUM SERPL-MCNC: 7.4 MG/DL — LOW (ref 8.4–10.5)
CALCIUM SERPL-MCNC: 7.8 MG/DL — LOW (ref 8.4–10.5)
CALCIUM SERPL-MCNC: 7.9 MG/DL — LOW (ref 8.4–10.5)
CHLORIDE BLDV-SCNC: 101 MMOL/L — SIGNIFICANT CHANGE UP (ref 96–108)
CHLORIDE SERPL-SCNC: 103 MMOL/L — SIGNIFICANT CHANGE UP (ref 98–107)
CHLORIDE SERPL-SCNC: 104 MMOL/L — SIGNIFICANT CHANGE UP (ref 98–107)
CHLORIDE SERPL-SCNC: 104 MMOL/L — SIGNIFICANT CHANGE UP (ref 98–107)
CK SERPL-CCNC: HIGH U/L (ref 30–200)
CO2 BLDV-SCNC: 23.7 MMOL/L — SIGNIFICANT CHANGE UP (ref 22–26)
CO2 SERPL-SCNC: 20 MMOL/L — LOW (ref 22–31)
CO2 SERPL-SCNC: 22 MMOL/L — SIGNIFICANT CHANGE UP (ref 22–31)
CO2 SERPL-SCNC: 25 MMOL/L — SIGNIFICANT CHANGE UP (ref 22–31)
COLOR SPEC: YELLOW — SIGNIFICANT CHANGE UP
CREAT SERPL-MCNC: 1.06 MG/DL — SIGNIFICANT CHANGE UP (ref 0.5–1.3)
CREAT SERPL-MCNC: 1.09 MG/DL — SIGNIFICANT CHANGE UP (ref 0.5–1.3)
CREAT SERPL-MCNC: 1.41 MG/DL — HIGH (ref 0.5–1.3)
DIFF PNL FLD: ABNORMAL
EGFR: 60 ML/MIN/1.73M2 — SIGNIFICANT CHANGE UP
EGFR: 82 ML/MIN/1.73M2 — SIGNIFICANT CHANGE UP
EGFR: 84 ML/MIN/1.73M2 — SIGNIFICANT CHANGE UP
EOSINOPHIL # BLD AUTO: 0 K/UL — SIGNIFICANT CHANGE UP (ref 0–0.5)
EOSINOPHIL # BLD AUTO: 0.09 K/UL — SIGNIFICANT CHANGE UP (ref 0–0.5)
EOSINOPHIL NFR BLD AUTO: 0 % — SIGNIFICANT CHANGE UP (ref 0–6)
EOSINOPHIL NFR BLD AUTO: 0.7 % — SIGNIFICANT CHANGE UP (ref 0–6)
EPI CELLS # UR: 4 /HPF — SIGNIFICANT CHANGE UP (ref 0–5)
GAS PNL BLDV: 133 MMOL/L — LOW (ref 136–145)
GIANT PLATELETS BLD QL SMEAR: PRESENT — SIGNIFICANT CHANGE UP
GLUCOSE BLDC GLUCOMTR-MCNC: 110 MG/DL — HIGH (ref 70–99)
GLUCOSE BLDC GLUCOMTR-MCNC: 98 MG/DL — SIGNIFICANT CHANGE UP (ref 70–99)
GLUCOSE BLDV-MCNC: 156 MG/DL — HIGH (ref 70–99)
GLUCOSE SERPL-MCNC: 128 MG/DL — HIGH (ref 70–99)
GLUCOSE SERPL-MCNC: 140 MG/DL — HIGH (ref 70–99)
GLUCOSE SERPL-MCNC: 161 MG/DL — HIGH (ref 70–99)
GLUCOSE UR QL: NEGATIVE — SIGNIFICANT CHANGE UP
HCO3 BLDV-SCNC: 22 MMOL/L — SIGNIFICANT CHANGE UP (ref 22–29)
HCT VFR BLD CALC: 34.2 % — LOW (ref 39–50)
HCT VFR BLD CALC: 36.4 % — LOW (ref 39–50)
HCT VFR BLD CALC: 42.2 % — SIGNIFICANT CHANGE UP (ref 39–50)
HCT VFR BLDA CALC: 45 % — SIGNIFICANT CHANGE UP (ref 39–51)
HGB BLD CALC-MCNC: 15.1 G/DL — SIGNIFICANT CHANGE UP (ref 13–17)
HGB BLD-MCNC: 11.4 G/DL — LOW (ref 13–17)
HGB BLD-MCNC: 12.2 G/DL — LOW (ref 13–17)
HGB BLD-MCNC: 13.9 G/DL — SIGNIFICANT CHANGE UP (ref 13–17)
HYALINE CASTS # UR AUTO: 10 /LPF — SIGNIFICANT CHANGE UP (ref 0–7)
IANC: 10.74 K/UL — HIGH (ref 1.8–7.4)
IANC: 12.68 K/UL — HIGH (ref 1.8–7.4)
IMM GRANULOCYTES NFR BLD AUTO: 0.4 % — SIGNIFICANT CHANGE UP (ref 0–0.9)
KETONES UR-MCNC: ABNORMAL
LACTATE BLDV-MCNC: 3.8 MMOL/L — HIGH (ref 0.5–2)
LACTATE SERPL-SCNC: 3.6 MMOL/L — HIGH (ref 0.5–2)
LEUKOCYTE ESTERASE UR-ACNC: NEGATIVE — SIGNIFICANT CHANGE UP
LYMPHOCYTES # BLD AUTO: 0.97 K/UL — LOW (ref 1–3.3)
LYMPHOCYTES # BLD AUTO: 1.31 K/UL — SIGNIFICANT CHANGE UP (ref 1–3.3)
LYMPHOCYTES # BLD AUTO: 6.2 % — LOW (ref 13–44)
LYMPHOCYTES # BLD AUTO: 9.5 % — LOW (ref 13–44)
MAGNESIUM SERPL-MCNC: 2 MG/DL — SIGNIFICANT CHANGE UP (ref 1.6–2.6)
MAGNESIUM SERPL-MCNC: 2.1 MG/DL — SIGNIFICANT CHANGE UP (ref 1.6–2.6)
MANUAL SMEAR VERIFICATION: SIGNIFICANT CHANGE UP
MCHC RBC-ENTMCNC: 29.1 PG — SIGNIFICANT CHANGE UP (ref 27–34)
MCHC RBC-ENTMCNC: 29.2 PG — SIGNIFICANT CHANGE UP (ref 27–34)
MCHC RBC-ENTMCNC: 29.2 PG — SIGNIFICANT CHANGE UP (ref 27–34)
MCHC RBC-ENTMCNC: 32.9 GM/DL — SIGNIFICANT CHANGE UP (ref 32–36)
MCHC RBC-ENTMCNC: 33.3 GM/DL — SIGNIFICANT CHANGE UP (ref 32–36)
MCHC RBC-ENTMCNC: 33.5 GM/DL — SIGNIFICANT CHANGE UP (ref 32–36)
MCV RBC AUTO: 87.1 FL — SIGNIFICANT CHANGE UP (ref 80–100)
MCV RBC AUTO: 87.5 FL — SIGNIFICANT CHANGE UP (ref 80–100)
MCV RBC AUTO: 88.5 FL — SIGNIFICANT CHANGE UP (ref 80–100)
MONOCYTES # BLD AUTO: 1.48 K/UL — HIGH (ref 0–0.9)
MONOCYTES # BLD AUTO: 1.78 K/UL — HIGH (ref 0–0.9)
MONOCYTES NFR BLD AUTO: 10.8 % — SIGNIFICANT CHANGE UP (ref 2–14)
MONOCYTES NFR BLD AUTO: 11.4 % — SIGNIFICANT CHANGE UP (ref 2–14)
NEUTROPHILS # BLD AUTO: 10.74 K/UL — HIGH (ref 1.8–7.4)
NEUTROPHILS # BLD AUTO: 12.88 K/UL — HIGH (ref 1.8–7.4)
NEUTROPHILS NFR BLD AUTO: 78.3 % — HIGH (ref 43–77)
NEUTROPHILS NFR BLD AUTO: 79.8 % — HIGH (ref 43–77)
NEUTS BAND # BLD: 2.6 % — SIGNIFICANT CHANGE UP (ref 0–6)
NITRITE UR-MCNC: NEGATIVE — SIGNIFICANT CHANGE UP
NRBC # BLD: 0 /100 WBCS — SIGNIFICANT CHANGE UP (ref 0–0)
NRBC # BLD: 0 /100 WBCS — SIGNIFICANT CHANGE UP (ref 0–0)
NRBC # FLD: 0 K/UL — SIGNIFICANT CHANGE UP (ref 0–0)
NRBC # FLD: 0 K/UL — SIGNIFICANT CHANGE UP (ref 0–0)
PCO2 BLDV: 55 MMHG — SIGNIFICANT CHANGE UP (ref 42–55)
PH BLDV: 7.21 — LOW (ref 7.32–7.43)
PH UR: 6 — SIGNIFICANT CHANGE UP (ref 5–8)
PHOSPHATE SERPL-MCNC: 3.7 MG/DL — SIGNIFICANT CHANGE UP (ref 2.5–4.5)
PHOSPHATE SERPL-MCNC: 4.6 MG/DL — HIGH (ref 2.5–4.5)
PLAT MORPH BLD: NORMAL — SIGNIFICANT CHANGE UP
PLATELET # BLD AUTO: 129 K/UL — LOW (ref 150–400)
PLATELET # BLD AUTO: 131 K/UL — LOW (ref 150–400)
PLATELET # BLD AUTO: 163 K/UL — SIGNIFICANT CHANGE UP (ref 150–400)
PLATELET COUNT - ESTIMATE: NORMAL — SIGNIFICANT CHANGE UP
PO2 BLDV: 44 MMHG — SIGNIFICANT CHANGE UP
POTASSIUM BLDV-SCNC: 5 MMOL/L — SIGNIFICANT CHANGE UP (ref 3.5–5.1)
POTASSIUM SERPL-MCNC: 3.7 MMOL/L — SIGNIFICANT CHANGE UP (ref 3.5–5.3)
POTASSIUM SERPL-MCNC: 4 MMOL/L — SIGNIFICANT CHANGE UP (ref 3.5–5.3)
POTASSIUM SERPL-MCNC: 4.7 MMOL/L — SIGNIFICANT CHANGE UP (ref 3.5–5.3)
POTASSIUM SERPL-SCNC: 3.7 MMOL/L — SIGNIFICANT CHANGE UP (ref 3.5–5.3)
POTASSIUM SERPL-SCNC: 4 MMOL/L — SIGNIFICANT CHANGE UP (ref 3.5–5.3)
POTASSIUM SERPL-SCNC: 4.7 MMOL/L — SIGNIFICANT CHANGE UP (ref 3.5–5.3)
PROCALCITONIN SERPL-MCNC: 5.23 NG/ML — HIGH (ref 0.02–0.1)
PROT SERPL-MCNC: 5 G/DL — LOW (ref 6–8.3)
PROT SERPL-MCNC: 5.5 G/DL — LOW (ref 6–8.3)
PROT SERPL-MCNC: 6.2 G/DL — SIGNIFICANT CHANGE UP (ref 6–8.3)
PROT UR-MCNC: ABNORMAL
RBC # BLD: 3.91 M/UL — LOW (ref 4.2–5.8)
RBC # BLD: 4.18 M/UL — LOW (ref 4.2–5.8)
RBC # BLD: 4.77 M/UL — SIGNIFICANT CHANGE UP (ref 4.2–5.8)
RBC # FLD: 13.3 % — SIGNIFICANT CHANGE UP (ref 10.3–14.5)
RBC # FLD: 13.5 % — SIGNIFICANT CHANGE UP (ref 10.3–14.5)
RBC # FLD: 13.7 % — SIGNIFICANT CHANGE UP (ref 10.3–14.5)
RBC BLD AUTO: NORMAL — SIGNIFICANT CHANGE UP
RBC CASTS # UR COMP ASSIST: 7 /HPF — HIGH (ref 0–4)
SAO2 % BLDV: 69.7 % — SIGNIFICANT CHANGE UP
SMUDGE CELLS # BLD: PRESENT — SIGNIFICANT CHANGE UP
SODIUM SERPL-SCNC: 135 MMOL/L — SIGNIFICANT CHANGE UP (ref 135–145)
SODIUM SERPL-SCNC: 137 MMOL/L — SIGNIFICANT CHANGE UP (ref 135–145)
SODIUM SERPL-SCNC: 137 MMOL/L — SIGNIFICANT CHANGE UP (ref 135–145)
SP GR SPEC: >1.05 (ref 1.01–1.05)
URATE CRY FLD QL MICRO: ABNORMAL
UROBILINOGEN FLD QL: SIGNIFICANT CHANGE UP
WBC # BLD: 13.72 K/UL — HIGH (ref 3.8–10.5)
WBC # BLD: 14.35 K/UL — HIGH (ref 3.8–10.5)
WBC # BLD: 15.63 K/UL — HIGH (ref 3.8–10.5)
WBC # FLD AUTO: 13.72 K/UL — HIGH (ref 3.8–10.5)
WBC # FLD AUTO: 14.35 K/UL — HIGH (ref 3.8–10.5)
WBC # FLD AUTO: 15.63 K/UL — HIGH (ref 3.8–10.5)
WBC UR QL: 14 /HPF — HIGH (ref 0–5)

## 2022-12-03 PROCEDURE — 95720 EEG PHY/QHP EA INCR W/VEEG: CPT

## 2022-12-03 PROCEDURE — 71045 X-RAY EXAM CHEST 1 VIEW: CPT | Mod: 26

## 2022-12-03 PROCEDURE — 99291 CRITICAL CARE FIRST HOUR: CPT | Mod: GC

## 2022-12-03 RX ORDER — NOREPINEPHRINE BITARTRATE/D5W 8 MG/250ML
0.05 PLASTIC BAG, INJECTION (ML) INTRAVENOUS
Qty: 8 | Refills: 0 | Status: DISCONTINUED | OUTPATIENT
Start: 2022-12-03 | End: 2022-12-04

## 2022-12-03 RX ORDER — SODIUM CHLORIDE 9 MG/ML
1000 INJECTION, SOLUTION INTRAVENOUS ONCE
Refills: 0 | Status: COMPLETED | OUTPATIENT
Start: 2022-12-03 | End: 2022-12-03

## 2022-12-03 RX ORDER — POTASSIUM CHLORIDE 20 MEQ
10 PACKET (EA) ORAL
Refills: 0 | Status: DISCONTINUED | OUTPATIENT
Start: 2022-12-03 | End: 2022-12-03

## 2022-12-03 RX ORDER — POTASSIUM CHLORIDE 20 MEQ
20 PACKET (EA) ORAL ONCE
Refills: 0 | Status: DISCONTINUED | OUTPATIENT
Start: 2022-12-03 | End: 2022-12-03

## 2022-12-03 RX ORDER — VANCOMYCIN HCL 1 G
1000 VIAL (EA) INTRAVENOUS EVERY 8 HOURS
Refills: 0 | Status: DISCONTINUED | OUTPATIENT
Start: 2022-12-03 | End: 2022-12-04

## 2022-12-03 RX ORDER — HEPARIN SODIUM 5000 [USP'U]/ML
4000 INJECTION INTRAVENOUS; SUBCUTANEOUS EVERY 6 HOURS
Refills: 0 | Status: DISCONTINUED | OUTPATIENT
Start: 2022-12-03 | End: 2022-12-04

## 2022-12-03 RX ORDER — CEFTRIAXONE 500 MG/1
2000 INJECTION, POWDER, FOR SOLUTION INTRAMUSCULAR; INTRAVENOUS EVERY 12 HOURS
Refills: 0 | Status: DISCONTINUED | OUTPATIENT
Start: 2022-12-03 | End: 2022-12-04

## 2022-12-03 RX ORDER — TETANUS TOXOID, REDUCED DIPHTHERIA TOXOID AND ACELLULAR PERTUSSIS VACCINE, ADSORBED 5; 2.5; 8; 8; 2.5 [IU]/.5ML; [IU]/.5ML; UG/.5ML; UG/.5ML; UG/.5ML
0.5 SUSPENSION INTRAMUSCULAR ONCE
Refills: 0 | Status: DISCONTINUED | OUTPATIENT
Start: 2022-12-03 | End: 2023-01-11

## 2022-12-03 RX ORDER — HEPARIN SODIUM 5000 [USP'U]/ML
8000 INJECTION INTRAVENOUS; SUBCUTANEOUS EVERY 6 HOURS
Refills: 0 | Status: DISCONTINUED | OUTPATIENT
Start: 2022-12-03 | End: 2022-12-04

## 2022-12-03 RX ORDER — HEPARIN SODIUM 5000 [USP'U]/ML
INJECTION INTRAVENOUS; SUBCUTANEOUS
Qty: 25000 | Refills: 0 | Status: DISCONTINUED | OUTPATIENT
Start: 2022-12-03 | End: 2022-12-04

## 2022-12-03 RX ORDER — SODIUM CHLORIDE 9 MG/ML
1000 INJECTION, SOLUTION INTRAVENOUS
Refills: 0 | Status: DISCONTINUED | OUTPATIENT
Start: 2022-12-03 | End: 2022-12-03

## 2022-12-03 RX ORDER — SODIUM CHLORIDE 9 MG/ML
1000 INJECTION, SOLUTION INTRAVENOUS
Refills: 0 | Status: DISCONTINUED | OUTPATIENT
Start: 2022-12-03 | End: 2022-12-06

## 2022-12-03 RX ORDER — FUROSEMIDE 40 MG
40 TABLET ORAL ONCE
Refills: 0 | Status: COMPLETED | OUTPATIENT
Start: 2022-12-03 | End: 2022-12-03

## 2022-12-03 RX ADMIN — CEFTRIAXONE 100 MILLIGRAM(S): 500 INJECTION, POWDER, FOR SOLUTION INTRAMUSCULAR; INTRAVENOUS at 05:04

## 2022-12-03 RX ADMIN — Medication 250 MILLIGRAM(S): at 20:07

## 2022-12-03 RX ADMIN — FENTANYL CITRATE 10.5 MICROGRAM(S)/KG/HR: 50 INJECTION INTRAVENOUS at 11:06

## 2022-12-03 RX ADMIN — SODIUM CHLORIDE 300 MILLILITER(S): 9 INJECTION, SOLUTION INTRAVENOUS at 11:53

## 2022-12-03 RX ADMIN — CHLORHEXIDINE GLUCONATE 15 MILLILITER(S): 213 SOLUTION TOPICAL at 17:24

## 2022-12-03 RX ADMIN — SODIUM CHLORIDE 1000 MILLILITER(S): 9 INJECTION, SOLUTION INTRAVENOUS at 03:14

## 2022-12-03 RX ADMIN — HEPARIN SODIUM 1800 UNIT(S)/HR: 5000 INJECTION INTRAVENOUS; SUBCUTANEOUS at 12:00

## 2022-12-03 RX ADMIN — Medication 8.97 MICROGRAM(S)/KG/MIN: at 19:23

## 2022-12-03 RX ADMIN — Medication 250 MILLIGRAM(S): at 12:00

## 2022-12-03 RX ADMIN — SODIUM CHLORIDE 300 MILLILITER(S): 9 INJECTION, SOLUTION INTRAVENOUS at 11:06

## 2022-12-03 RX ADMIN — PROPOFOL 12.6 MICROGRAM(S)/KG/MIN: 10 INJECTION, EMULSION INTRAVENOUS at 19:23

## 2022-12-03 RX ADMIN — Medication 81 MILLIGRAM(S): at 11:53

## 2022-12-03 RX ADMIN — PROPOFOL 12.6 MICROGRAM(S)/KG/MIN: 10 INJECTION, EMULSION INTRAVENOUS at 11:05

## 2022-12-03 RX ADMIN — HEPARIN SODIUM 0 UNIT(S)/HR: 5000 INJECTION INTRAVENOUS; SUBCUTANEOUS at 19:23

## 2022-12-03 RX ADMIN — Medication 40 MILLIGRAM(S): at 15:24

## 2022-12-03 RX ADMIN — HEPARIN SODIUM 0 UNIT(S)/HR: 5000 INJECTION INTRAVENOUS; SUBCUTANEOUS at 18:53

## 2022-12-03 RX ADMIN — Medication 100 MILLIEQUIVALENT(S): at 21:20

## 2022-12-03 RX ADMIN — Medication 8.97 MICROGRAM(S)/KG/MIN: at 11:06

## 2022-12-03 RX ADMIN — SODIUM CHLORIDE 1000 MILLILITER(S): 9 INJECTION, SOLUTION INTRAVENOUS at 03:46

## 2022-12-03 RX ADMIN — CHLORHEXIDINE GLUCONATE 15 MILLILITER(S): 213 SOLUTION TOPICAL at 05:03

## 2022-12-03 RX ADMIN — PROPOFOL 12.6 MICROGRAM(S)/KG/MIN: 10 INJECTION, EMULSION INTRAVENOUS at 17:23

## 2022-12-03 RX ADMIN — HEPARIN SODIUM 5000 UNIT(S): 5000 INJECTION INTRAVENOUS; SUBCUTANEOUS at 05:04

## 2022-12-03 RX ADMIN — Medication 250 MILLIGRAM(S): at 03:15

## 2022-12-03 RX ADMIN — Medication 100 MILLIEQUIVALENT(S): at 20:14

## 2022-12-03 RX ADMIN — CEFTRIAXONE 100 MILLIGRAM(S): 500 INJECTION, POWDER, FOR SOLUTION INTRAMUSCULAR; INTRAVENOUS at 17:24

## 2022-12-03 RX ADMIN — FENTANYL CITRATE 10.5 MICROGRAM(S)/KG/HR: 50 INJECTION INTRAVENOUS at 19:23

## 2022-12-03 RX ADMIN — HEPARIN SODIUM 0 UNIT(S)/HR: 5000 INJECTION INTRAVENOUS; SUBCUTANEOUS at 18:48

## 2022-12-03 RX ADMIN — SODIUM CHLORIDE 250 MILLILITER(S): 9 INJECTION, SOLUTION INTRAVENOUS at 19:22

## 2022-12-03 RX ADMIN — SODIUM CHLORIDE 400 MILLILITER(S): 9 INJECTION, SOLUTION INTRAVENOUS at 13:11

## 2022-12-03 RX ADMIN — SODIUM CHLORIDE 400 MILLILITER(S): 9 INJECTION, SOLUTION INTRAVENOUS at 17:23

## 2022-12-03 NOTE — PROGRESS NOTE ADULT - ASSESSMENT
52-year-old male with a PMHx significant for TIAs (2011, 2013), CVAs x3 (02/2022 s/p tPA, 8/3/2022 s/p tPA, 11/5/2022 with residual expressive aphasia) on Eliquis, and HLD BIBEMS after being found unresponsive at home on the floor, last known well 12/1 at around noon.     On arrival, patient with rectal temp 105.4 F, tachy to 130s, hypertensive to 180/110, and tachypneic to 30. C-collar was placed. Patient was intubated in the ED for airway protection with etomidate and succinylcholine. Patient sedated with propofol and fentanyl. Started on vanco and zosyn. 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 CT cervical spine and maxillofacial scans negative. CT chest, abdomen, and pelvis w/ contrast concerning for possible partial SBO without transition point. Surgery consulted in ED, no acute surgical intervention at this time. CT thoracic and lumbar spine showing degenerative disc disease T6-T7 through L4-L5 and mild disc bulges at L2-L3 through L4-L5 that flatten the ventral thecal sac and narrowing of the bilateral neural foramina.    NEURO  #AMS, Intubated and Sedated now  - Patient found down and unresponsive at home on 12/2, last known well 12/1 at around noon. Baseline AOx4.  - DDx: stroke v traumatic (fall?) vs metabolic encephalopathy (sepsis?). Hypertension may be indicative of acute stroke and patient has a significant history of 3 prior CVAs this year. Patient febrile to 105.4 F on arrival.   - CTH and CT cervical spine negative for any acute pathologies. Tox screen negative. Urine appears cloudy on exam.   - Patient intubated in ED for airway protection with etomidate and succinylcholine.   - s/p vanc and zosyn, will switch to vanco and ceftriaxone with meningitis dosing   - c/w propofol and fentanyl for sedation  - c/w C-collar at this time  - Patient will need MRI to evaluate for stroke  - Will obtain EEG    #CVAs/TIAs  - Hx of TIAs in 2011 and 2013, CVAs x3 in 02/22, 8/22, and 11/22 with residual expressive aphasia   - on Eliquis and Aspirin at home   - c/w ASA  - start heparin 5000 Q8    #Thecal sac flattening   - CT thoracic and lumbar spine showing degenerative disc disease T6-T7 through L4-L5 and mild disc bulges at L2-L3 through L4-L5 that flatten the ventral thecal sac and narrowing of the bilateral neural foramina  - Will Monitor for now, will consider neuro/neurosurg evaluation in the future    CARDIAC  #HLD  - atorvastatin on hold iso partial SBO    #?ASD/PFO  - Patient reportedly found to have a PFO in February 2022 during a stroke workup. Patient presented for a PFO closure in November 2022. Notably, no PFO was found at the time and no intervention was performed.   - TTE 11/5/22 EF 57% and grossly normal LV function    #Elevated troponins  - Troponin 197 then 181    PULM  #Intubated in ED on 12/2  - Intubated for airway protection  - Trend VBGs/ABGs    RENAL  #JOSEFINA  - Cr 1.49 on presentation, previous baseline 0.9 range  - DDx: decreased PO intake x24hrs v sepsis (cloudy urine seen at bedside) v rhabdo  - Trend Cr    #Rhabdomyolysis  - CK 8720 on admission  - DDx: trauma v hyperthermia (T 105.4 F) v sepsis   - Will trend CKs    GI   #Partial SBO  - CT chest, abdomen, and pelvis w/ contrast concerning for possible partial SBO without transition point.  - Surgery consulted, no acute intervention at this time  - OGT placed in ED  - NPO for now with OGt to low wall suction  - Monitor for BMs, monitor for abdominal distension    HEME/ONC  #Leukocytosis  - Concern for septic process  - Trend CBCs    ID  #Concern for sepsis  - Patient presented with AMS, T 105.4, tachycardic, and tachypneic   - UA negative, follow up repeat given cloudy urine at bedside   - s/p vanco and zosyn x1 in ED  - c/w vanco, adjust by trough after 4th dose  - start ceftriaxone 2g BID given c/f meningitis   - F/u BCx x2, UCx, procal    #Febrile  - Temp 105.4 F on admission  - will give IV acetaminophen 1g x1 now  - Trend fever curve, can consider further acetaminophen and cooling blankets as needed     ENDO  - No acute issues    DVT  - Heparin 5000 SubQ Q8 52-year-old male with a PMHx significant for TIAs (2011, 2013), CVAs x3 (02/2022 s/p tPA, 8/3/2022 s/p tPA, 11/5/2022 with residual expressive aphasia) on Eliquis, and HLD BIBEMS after being found unresponsive at home on the floor, last known well 12/1 at around noon. C-collar was placed. Patient was intubated in the ED for airway protection. 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     NEURO  #AMS, Intubated and Sedated now  - Patient found down and unresponsive at home on 12/2, last known well 12/1 at around noon. Baseline AOx4.  - DDx: stroke v traumatic (fall?) vs seizure vs metabolic encephalopathy (sepsis?). Hypertension may be indicative of acute stroke and patient has a significant history of 3 prior CVAs this year. Patient febrile to 105.4 F on arrival.   - CTH and CT cervical spine negative for any acute pathologies. Tox screen negative. Urine appears cloudy on exam.   - Patient intubated in ED for airway protection with etomidate and succinylcholine.   - s/p vanc and zosyn, will switch to vanco and ceftriaxone with meningitis dosing   - c/w propofol and fentanyl for sedation  - c/w C-collar at this time  - f/u MR brain for stroke evaluation, patient has pacemaker   - f/u EEG. If negative for acute seizure, can attempt to wake him up    #CVAs/TIAs  - Hx of TIAs in 2011 and 2013, CVAs x3 in 02/22, 8/22, and 11/22 with residual expressive aphasia   - on Eliquis and Aspirin at home   - c/w ASA  - start heparin gtt  [ ] f/u Neuro recs    #Thecal sac flattening   - CT thoracic and lumbar spine showing degenerative disc disease T6-T7 through L4-L5 and mild disc bulges at L2-L3 through L4-L5 that flatten the ventral thecal sac and narrowing of the bilateral neural foramina  - Will Monitor for now, will consider neuro/neurosurg evaluation in the future    CARDIAC  #HLD  - atorvastatin on hold iso partial SBO    #?ASD/PFO  - Patient reportedly found to have a PFO in February 2022 during a stroke workup. Patient presented for a PFO closure in November 2022. Notably, no PFO was found at the time and no intervention was performed.   - TTE 11/5/22 EF 57% and grossly normal LV function    #Elevated troponins  - Troponin 197 then 181  [ ] f/u ECG    PULM  #Intubated in ED on 12/2  - Intubated for airway protection  - Trend VBGs/ABGs    RENAL  #JOSEFINA  - Cr 1.49 on presentation, previous baseline 0.9 range  - DDx: decreased PO intake x24hrs v sepsis (cloudy urine seen at bedside) v rhabdo  - Trend Cr    #Rhabdomyolysis  - CK 8720 on admission  - DDx: trauma v hyperthermia (T 105.4 F) v sepsis   - Will trend CKs  - Increased LR rate to 300 cc/hr    GI   #Partial SBO  - CT chest, abdomen, and pelvis w/ contrast concerning for possible partial SBO without transition point.  - Surgery consulted, no acute intervention at this time  - OGT placed in ED  - NPO for now with OGt to low wall suction  - Monitor for BMs, monitor for abdominal distension  - consider starting feeds later if no output noted    HEME/ONC  #Leukocytosis  - Concern for septic process  - Trend CBCs    ID  #Concern for sepsis  - Patient presented with AMS, T 105.4, tachycardic, and tachypneic   - UA negative, follow up repeat given cloudy urine at bedside   - s/p vanco and zosyn x1 in ED  - c/w vanco, adjust by trough after 4th dose  - start ceftriaxone 2g BID given c/f meningitis   - F/u BCx x2, UCx, procal  - can obtain LP on Monday if remains febrile and infection considered    #Febrile  - Temp 105.4 F on admission  - will give IV acetaminophen 1g x1 now  - Trend fever curve, can consider further acetaminophen and cooling blankets as needed     ENDO  - No acute issues    DVT  - Hep gtt 52-year-old male with a PMHx significant for TIAs (2011, 2013), CVAs x3 (02/2022 s/p tPA, 8/3/2022 s/p tPA, 11/5/2022 with residual expressive aphasia) on Eliquis, and HLD BIBEMS after being found unresponsive at home on the floor, last known well 12/1 at around noon. C-collar was placed. Patient was intubated in the ED for airway protection. 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     NEURO  #AMS, Intubated and Sedated now  - Patient found down and unresponsive at home on 12/2, last known well 12/1 at around noon. Baseline AOx4.  - DDx: stroke v traumatic (fall?) vs seizure vs metabolic encephalopathy (sepsis?). Hypertension may be indicative of acute stroke and patient has a significant history of 3 prior CVAs this year. Patient febrile to 105.4 F on arrival.   - CTH and CT cervical spine negative for any acute pathologies. Tox screen negative. Urine appears cloudy on exam.   - Patient intubated in ED for airway protection with etomidate and succinylcholine.   - s/p vanc and zosyn, will switch to vanco and ceftriaxone with meningitis dosing   - c/w propofol and fentanyl for sedation  - c/w C-collar at this time  - f/u MR brain for stroke evaluation, patient has loop recorder  - f/u EEG. If negative for acute seizure, can attempt to wake him up    #CVAs/TIAs  - Hx of TIAs in 2011 and 2013, CVAs x3 in 02/22, 8/22, and 11/22 with residual expressive aphasia   - on Eliquis and Aspirin at home   - c/w ASA  - start heparin gtt  [ ] f/u Neuro recs    #Thecal sac flattening   - CT thoracic and lumbar spine showing degenerative disc disease T6-T7 through L4-L5 and mild disc bulges at L2-L3 through L4-L5 that flatten the ventral thecal sac and narrowing of the bilateral neural foramina  - Will Monitor for now, will consider neuro/neurosurg evaluation in the future    CARDIAC  #HLD  - atorvastatin on hold iso partial SBO    #?ASD/PFO  - Patient reportedly found to have a PFO in February 2022 during a stroke workup. Patient presented for a PFO closure in November 2022. Notably, no PFO was found at the time and no intervention was performed.   - TTE 11/5/22 EF 57% and grossly normal LV function    #Elevated troponins  - Troponin 197 then 181  [ ] f/u ECG    PULM  #Intubated in ED on 12/2  - Intubated for airway protection  - Trend VBGs/ABGs    RENAL  #JOSEFINA  - Cr 1.49 on presentation, previous baseline 0.9 range  - DDx: decreased PO intake x24hrs v sepsis (cloudy urine seen at bedside) v rhabdo  - Trend Cr    #Rhabdomyolysis  - CK 8720 on admission  - DDx: trauma v hyperthermia (T 105.4 F) v sepsis   - Will trend CKs  - Increased LR rate to 300 cc/hr    GI   #Partial SBO  - CT chest, abdomen, and pelvis w/ contrast concerning for possible partial SBO without transition point.  - Surgery consulted, no acute intervention at this time  - OGT placed in ED  - NPO for now with OGt to low wall suction  - Monitor for BMs, monitor for abdominal distension  - consider starting feeds later if no output noted    HEME/ONC  #Leukocytosis  - Concern for septic process  - Trend CBCs    ID  #Concern for sepsis  - Patient presented with AMS, T 105.4, tachycardic, and tachypneic   - UA negative, follow up repeat given cloudy urine at bedside   - s/p vanco and zosyn x1 in ED  - c/w vanco, adjust by trough after 4th dose  - start ceftriaxone 2g BID given c/f meningitis   - F/u BCx x2, UCx, procal  - can obtain LP on Monday if remains febrile and infection considered    #Febrile  - Temp 105.4 F on admission  - will give IV acetaminophen 1g x1 now  - Trend fever curve, can consider further acetaminophen and cooling blankets as needed     ENDO  - No acute issues    DVT  - Hep gtt

## 2022-12-03 NOTE — CHART NOTE - NSCHARTNOTEFT_GEN_A_CORE
: Viv Quinonez    INDICATION: critical illness    PROCEDURE:  [x] LIMITED ECHO  [x] LIMITED CHEST  [ ] LIMITED RETROPERITONEAL  [ ] LIMITED ABDOMINAL  [ ] LIMITED DVT  [ ] NEEDLE GUIDANCE VASCULAR  [ ] NEEDLE GUIDANCE THORACENTESIS  [ ] NEEDLE GUIDANCE PARACENTESIS  [ ] NEEDLE GUIDANCE PERICARDIOCENTESIS  [ ] OTHER    FINDINGS:  A line predominant pattern in R anterior and bilateral posterior lung fields  Anterior L scattered B lines  No pleural effusion  LVEF normal, RV < LV  IVC 1 cm    INTERPRETATION:      Images uploaded on [employer]

## 2022-12-03 NOTE — PROGRESS NOTE ADULT - SUBJECTIVE AND OBJECTIVE BOX
INTERVAL HPI/OVERNIGHT EVENTS:    SUBJECTIVE: Patient seen and examined at bedside.     ROS: All negative except as listed above.    VITAL SIGNS:  ICU Vital Signs Last 24 Hrs  T(C): 37.8 (03 Dec 2022 04:00), Max: 40.8 (02 Dec 2022 19:44)  T(F): 100 (03 Dec 2022 04:00), Max: 105.4 (02 Dec 2022 19:44)  HR: 68 (03 Dec 2022 06:00) (68 - 136)  BP: 89/63 (03 Dec 2022 06:00) (78/67 - 180/110)  BP(mean): 73 (03 Dec 2022 06:00) (70 - 135)  ABP: --  ABP(mean): --  RR: 16 (03 Dec 2022 06:00) (16 - 30)  SpO2: 100% (03 Dec 2022 06:00) (100% - 100%)    O2 Parameters below as of 03 Dec 2022 06:00  Patient On (Oxygen Delivery Method): ventilator    O2 Concentration (%): 30      Mode: AC/ CMV (Assist Control/ Continuous Mandatory Ventilation), RR (machine): 16, TV (machine): 450, FiO2: 30, PEEP: 5, ITime: 0.6, MAP: 9, PIP: 18  Plateau pressure:   P/F ratio:     12-02 @ 07:01  -  12-03 @ 07:00  --------------------------------------------------------  IN: 2740.1 mL / OUT: 605 mL / NET: 2135.1 mL      CAPILLARY BLOOD GLUCOSE      POCT Blood Glucose.: 140 mg/dL (02 Dec 2022 19:51)      ECG: reviewed.    PHYSICAL EXAM:    GENERAL: NAD, lying in bed comfortably  HEAD:  Atraumatic, normocephalic  EYES: EOMI, PERRLA, conjunctiva and sclera clear  NECK: Supple, trachea midline, no JVD  HEART: Regular rate and rhythm, no murmurs, rubs, or gallops  LUNGS: Unlabored respirations.  Clear to auscultation bilaterally, no crackles, wheezing, or rhonchi  ABDOMEN: Soft, nontender, nondistended, +BS  EXTREMITIES: 2+ peripheral pulses bilaterally, cap refill<2 secs. No clubbing, cyanosis, or edema  NERVOUS SYSTEM:  A&Ox3, following commands, moving all extremities, no focal deficits   SKIN: No rashes or lesions    MEDICATIONS:  MEDICATIONS  (STANDING):  acetaminophen   IVPB .. 1000 milliGRAM(s) IV Intermittent once  aspirin enteric coated 81 milliGRAM(s) Oral daily  cefTRIAXone   IVPB 2000 milliGRAM(s) IV Intermittent every 12 hours  chlorhexidine 0.12% Liquid 15 milliLiter(s) Oral Mucosa every 12 hours  chlorhexidine 4% Liquid 1 Application(s) Topical <User Schedule>  diphtheria/tetanus/pertussis (acellular) Vaccine (Adacel) 0.5 milliLiter(s) IntraMuscular once  fentaNYL   Infusion. 1.5 MICROgram(s)/kG/Hr (10.5 mL/Hr) IV Continuous <Continuous>  heparin   Injectable 5000 Unit(s) SubCutaneous every 8 hours  lactated ringers. 1000 milliLiter(s) (150 mL/Hr) IV Continuous <Continuous>  norepinephrine Infusion 0.05 MICROgram(s)/kG/Min (8.97 mL/Hr) IV Continuous <Continuous>  propofol Infusion 30 MICROgram(s)/kG/Min (12.6 mL/Hr) IV Continuous <Continuous>  vancomycin  IVPB 1000 milliGRAM(s) IV Intermittent every 8 hours    MEDICATIONS  (PRN):      ALLERGIES:  Allergies    No Known Allergies    Intolerances        LABS:                        13.9   15.63 )-----------( 163      ( 03 Dec 2022 01:00 )             42.2     12-03    135  |  104  |  22  ----------------------------<  140<H>  4.7   |  20<L>  |  1.41<H>    Ca    7.4<L>      03 Dec 2022 01:00  Phos  4.6     12-03  Mg     2.10     12-03    TPro  6.2  /  Alb  3.4  /  TBili  0.5  /  DBili  x   /  AST  191<H>  /  ALT  58<H>  /  AlkPhos  62  12-03    PT/INR - ( 02 Dec 2022 20:00 )   PT: 13.3 sec;   INR: 1.14 ratio         PTT - ( 02 Dec 2022 20:00 )  PTT:29.3 sec  Urinalysis Basic - ( 03 Dec 2022 01:45 )    Color: Yellow / Appearance: Turbid / SG: >1.050 / pH: x  Gluc: x / Ketone: Small  / Bili: Negative / Urobili: <2 mg/dL   Blood: x / Protein: 100 mg/dL / Nitrite: Negative   Leuk Esterase: Negative / RBC: 7 /HPF / WBC 14 /HPF   Sq Epi: x / Non Sq Epi: 4 /HPF / Bacteria: Few      ABG:  pH, Arterial: 7.31 (12-03-22 @ 05:40)  pCO2, Arterial: 44 mmHg (12-03-22 @ 05:40)  pO2, Arterial: 147 mmHg (12-03-22 @ 05:40)      vBG:  pH, Venous: 7.21 (12-03-22 @ 01:00)  pCO2, Venous: 55 mmHg (12-03-22 @ 01:00)  pO2, Venous: 44 mmHg (12-03-22 @ 01:00)  HCO3, Venous: 22 mmol/L (12-03-22 @ 01:00)  pH, Venous: 7.26 (12-02-22 @ 21:30)  pCO2, Venous: 44 mmHg (12-02-22 @ 21:30)  pO2, Venous: 43 mmHg (12-02-22 @ 21:30)  HCO3, Venous: 20 mmol/L (12-02-22 @ 21:30)  pH, Venous: 7.28 (12-02-22 @ 20:00)  pCO2, Venous: 52 mmHg (12-02-22 @ 20:00)  pO2, Venous: 24 mmHg (12-02-22 @ 20:00)  HCO3, Venous: 24 mmol/L (12-02-22 @ 20:00)    Micro:        RADIOLOGY & ADDITIONAL TESTS: Reviewed.   INTERVAL HPI/OVERNIGHT EVENTS:    SUBJECTIVE: Patient seen and examined at bedside. Patient intubated and sedated with c-collar in place.    ROS: All negative except as listed above.    VITAL SIGNS:  ICU Vital Signs Last 24 Hrs  T(C): 37.8 (03 Dec 2022 04:00), Max: 40.8 (02 Dec 2022 19:44)  T(F): 100 (03 Dec 2022 04:00), Max: 105.4 (02 Dec 2022 19:44)  HR: 68 (03 Dec 2022 06:00) (68 - 136)  BP: 89/63 (03 Dec 2022 06:00) (78/67 - 180/110)  BP(mean): 73 (03 Dec 2022 06:00) (70 - 135)  ABP: --  ABP(mean): --  RR: 16 (03 Dec 2022 06:00) (16 - 30)  SpO2: 100% (03 Dec 2022 06:00) (100% - 100%)    O2 Parameters below as of 03 Dec 2022 06:00  Patient On (Oxygen Delivery Method): ventilator    O2 Concentration (%): 30      Mode: AC/ CMV (Assist Control/ Continuous Mandatory Ventilation), RR (machine): 16, TV (machine): 450, FiO2: 30, PEEP: 5, ITime: 0.6, MAP: 9, PIP: 18  Plateau pressure:   P/F ratio:     12-02 @ 07:01  -  12-03 @ 07:00  --------------------------------------------------------  IN: 2740.1 mL / OUT: 605 mL / NET: 2135.1 mL      CAPILLARY BLOOD GLUCOSE      POCT Blood Glucose.: 140 mg/dL (02 Dec 2022 19:51)      ECG: reviewed.    PHYSICAL EXAM:    GENERAL: NAD, lying in bed comfortably  HEAD:  Atraumatic, normocephalic  EYES: EOMI, PERRLA, conjunctiva and sclera clear  NECK: Supple, no JVD  HEART: Regular rate and rhythm, no mrg  LUNGS: Unlabored respirations.  Clear to auscultation bilaterally, no crackles, wheezing, or rhonchi  ABDOMEN: Soft, nontender, nondistended, +BS  EXTREMITIES: 2+ peripheral pulses bilaterally, cap refill<2 secs. No clubbing, cyanosis, or edema  NERVOUS SYSTEM:  A&Ox3, following commands, moving all extremities, no focal deficits   SKIN: Diffuse skin abrasions/bruising present with R thigh hematoma, and L knee, as well as along multiple PIP joints and UE    MEDICATIONS:  MEDICATIONS  (STANDING):  acetaminophen   IVPB .. 1000 milliGRAM(s) IV Intermittent once  aspirin enteric coated 81 milliGRAM(s) Oral daily  cefTRIAXone   IVPB 2000 milliGRAM(s) IV Intermittent every 12 hours  chlorhexidine 0.12% Liquid 15 milliLiter(s) Oral Mucosa every 12 hours  chlorhexidine 4% Liquid 1 Application(s) Topical <User Schedule>  diphtheria/tetanus/pertussis (acellular) Vaccine (Adacel) 0.5 milliLiter(s) IntraMuscular once  fentaNYL   Infusion. 1.5 MICROgram(s)/kG/Hr (10.5 mL/Hr) IV Continuous <Continuous>  heparin   Injectable 5000 Unit(s) SubCutaneous every 8 hours  lactated ringers. 1000 milliLiter(s) (150 mL/Hr) IV Continuous <Continuous>  norepinephrine Infusion 0.05 MICROgram(s)/kG/Min (8.97 mL/Hr) IV Continuous <Continuous>  propofol Infusion 30 MICROgram(s)/kG/Min (12.6 mL/Hr) IV Continuous <Continuous>  vancomycin  IVPB 1000 milliGRAM(s) IV Intermittent every 8 hours    MEDICATIONS  (PRN):      ALLERGIES:  Allergies    No Known Allergies    Intolerances        LABS:                        13.9   15.63 )-----------( 163      ( 03 Dec 2022 01:00 )             42.2     12-03    135  |  104  |  22  ----------------------------<  140<H>  4.7   |  20<L>  |  1.41<H>    Ca    7.4<L>      03 Dec 2022 01:00  Phos  4.6     12-03  Mg     2.10     12-03    TPro  6.2  /  Alb  3.4  /  TBili  0.5  /  DBili  x   /  AST  191<H>  /  ALT  58<H>  /  AlkPhos  62  12-03    PT/INR - ( 02 Dec 2022 20:00 )   PT: 13.3 sec;   INR: 1.14 ratio         PTT - ( 02 Dec 2022 20:00 )  PTT:29.3 sec  Urinalysis Basic - ( 03 Dec 2022 01:45 )    Color: Yellow / Appearance: Turbid / SG: >1.050 / pH: x  Gluc: x / Ketone: Small  / Bili: Negative / Urobili: <2 mg/dL   Blood: x / Protein: 100 mg/dL / Nitrite: Negative   Leuk Esterase: Negative / RBC: 7 /HPF / WBC 14 /HPF   Sq Epi: x / Non Sq Epi: 4 /HPF / Bacteria: Few      ABG:  pH, Arterial: 7.31 (12-03-22 @ 05:40)  pCO2, Arterial: 44 mmHg (12-03-22 @ 05:40)  pO2, Arterial: 147 mmHg (12-03-22 @ 05:40)      vBG:  pH, Venous: 7.21 (12-03-22 @ 01:00)  pCO2, Venous: 55 mmHg (12-03-22 @ 01:00)  pO2, Venous: 44 mmHg (12-03-22 @ 01:00)  HCO3, Venous: 22 mmol/L (12-03-22 @ 01:00)  pH, Venous: 7.26 (12-02-22 @ 21:30)  pCO2, Venous: 44 mmHg (12-02-22 @ 21:30)  pO2, Venous: 43 mmHg (12-02-22 @ 21:30)  HCO3, Venous: 20 mmol/L (12-02-22 @ 21:30)  pH, Venous: 7.28 (12-02-22 @ 20:00)  pCO2, Venous: 52 mmHg (12-02-22 @ 20:00)  pO2, Venous: 24 mmHg (12-02-22 @ 20:00)  HCO3, Venous: 24 mmol/L (12-02-22 @ 20:00)    Micro:        RADIOLOGY & ADDITIONAL TESTS: Reviewed.   INTERVAL HPI/OVERNIGHT EVENTS:    SUBJECTIVE: Patient seen and examined at bedside. Patient intubated and sedated with c-collar in place.    ROS: All negative except as listed above.    VITAL SIGNS:  ICU Vital Signs Last 24 Hrs  T(C): 37.8 (03 Dec 2022 04:00), Max: 40.8 (02 Dec 2022 19:44)  T(F): 100 (03 Dec 2022 04:00), Max: 105.4 (02 Dec 2022 19:44)  HR: 68 (03 Dec 2022 06:00) (68 - 136)  BP: 89/63 (03 Dec 2022 06:00) (78/67 - 180/110)  BP(mean): 73 (03 Dec 2022 06:00) (70 - 135)  ABP: --  ABP(mean): --  RR: 16 (03 Dec 2022 06:00) (16 - 30)  SpO2: 100% (03 Dec 2022 06:00) (100% - 100%)    O2 Parameters below as of 03 Dec 2022 06:00  Patient On (Oxygen Delivery Method): ventilator    O2 Concentration (%): 30      Mode: AC/ CMV (Assist Control/ Continuous Mandatory Ventilation), RR (machine): 16, TV (machine): 450, FiO2: 30, PEEP: 5, ITime: 0.6, MAP: 9, PIP: 18  Plateau pressure:   P/F ratio:     12-02 @ 07:01  -  12-03 @ 07:00  --------------------------------------------------------  IN: 2740.1 mL / OUT: 605 mL / NET: 2135.1 mL      CAPILLARY BLOOD GLUCOSE      POCT Blood Glucose.: 140 mg/dL (02 Dec 2022 19:51)      ECG: reviewed.    PHYSICAL EXAM:    GENERAL: NAD, lying in bed comfortably  HEAD:  Atraumatic, normocephalic  EYES: EOMI, PERRLA, conjunctiva and sclera clear  NECK: Supple, no JVD  HEART: Regular rate and rhythm, no mrg  LUNGS: Unlabored respirations.  Clear to auscultation bilaterally, no crackles, wheezing, or rhonchi  ABDOMEN: Soft, nontender, nondistended, +BS  EXTREMITIES: 2+ peripheral pulses bilaterally, cap refill<2 secs. No clubbing, cyanosis, or edema  NERVOUS SYSTEM:  A&Ox0, intubated and sedated  SKIN: Diffuse skin abrasions/bruising present with R thigh hematoma, and L knee, as well as along multiple PIP joints and UE    MEDICATIONS:  MEDICATIONS  (STANDING):  acetaminophen   IVPB .. 1000 milliGRAM(s) IV Intermittent once  aspirin enteric coated 81 milliGRAM(s) Oral daily  cefTRIAXone   IVPB 2000 milliGRAM(s) IV Intermittent every 12 hours  chlorhexidine 0.12% Liquid 15 milliLiter(s) Oral Mucosa every 12 hours  chlorhexidine 4% Liquid 1 Application(s) Topical <User Schedule>  diphtheria/tetanus/pertussis (acellular) Vaccine (Adacel) 0.5 milliLiter(s) IntraMuscular once  fentaNYL   Infusion. 1.5 MICROgram(s)/kG/Hr (10.5 mL/Hr) IV Continuous <Continuous>  heparin   Injectable 5000 Unit(s) SubCutaneous every 8 hours  lactated ringers. 1000 milliLiter(s) (150 mL/Hr) IV Continuous <Continuous>  norepinephrine Infusion 0.05 MICROgram(s)/kG/Min (8.97 mL/Hr) IV Continuous <Continuous>  propofol Infusion 30 MICROgram(s)/kG/Min (12.6 mL/Hr) IV Continuous <Continuous>  vancomycin  IVPB 1000 milliGRAM(s) IV Intermittent every 8 hours    MEDICATIONS  (PRN):      ALLERGIES:  Allergies    No Known Allergies    Intolerances        LABS:                        13.9   15.63 )-----------( 163      ( 03 Dec 2022 01:00 )             42.2     12-03    135  |  104  |  22  ----------------------------<  140<H>  4.7   |  20<L>  |  1.41<H>    Ca    7.4<L>      03 Dec 2022 01:00  Phos  4.6     12-03  Mg     2.10     12-03    TPro  6.2  /  Alb  3.4  /  TBili  0.5  /  DBili  x   /  AST  191<H>  /  ALT  58<H>  /  AlkPhos  62  12-03    PT/INR - ( 02 Dec 2022 20:00 )   PT: 13.3 sec;   INR: 1.14 ratio         PTT - ( 02 Dec 2022 20:00 )  PTT:29.3 sec  Urinalysis Basic - ( 03 Dec 2022 01:45 )    Color: Yellow / Appearance: Turbid / SG: >1.050 / pH: x  Gluc: x / Ketone: Small  / Bili: Negative / Urobili: <2 mg/dL   Blood: x / Protein: 100 mg/dL / Nitrite: Negative   Leuk Esterase: Negative / RBC: 7 /HPF / WBC 14 /HPF   Sq Epi: x / Non Sq Epi: 4 /HPF / Bacteria: Few      ABG:  pH, Arterial: 7.31 (12-03-22 @ 05:40)  pCO2, Arterial: 44 mmHg (12-03-22 @ 05:40)  pO2, Arterial: 147 mmHg (12-03-22 @ 05:40)      vBG:  pH, Venous: 7.21 (12-03-22 @ 01:00)  pCO2, Venous: 55 mmHg (12-03-22 @ 01:00)  pO2, Venous: 44 mmHg (12-03-22 @ 01:00)  HCO3, Venous: 22 mmol/L (12-03-22 @ 01:00)  pH, Venous: 7.26 (12-02-22 @ 21:30)  pCO2, Venous: 44 mmHg (12-02-22 @ 21:30)  pO2, Venous: 43 mmHg (12-02-22 @ 21:30)  HCO3, Venous: 20 mmol/L (12-02-22 @ 21:30)  pH, Venous: 7.28 (12-02-22 @ 20:00)  pCO2, Venous: 52 mmHg (12-02-22 @ 20:00)  pO2, Venous: 24 mmHg (12-02-22 @ 20:00)  HCO3, Venous: 24 mmol/L (12-02-22 @ 20:00)    Micro:        RADIOLOGY & ADDITIONAL TESTS: Reviewed.

## 2022-12-04 LAB
ALBUMIN SERPL ELPH-MCNC: 2.6 G/DL — LOW (ref 3.3–5)
ALBUMIN SERPL ELPH-MCNC: 2.7 G/DL — LOW (ref 3.3–5)
ALP SERPL-CCNC: 53 U/L — SIGNIFICANT CHANGE UP (ref 40–120)
ALP SERPL-CCNC: 56 U/L — SIGNIFICANT CHANGE UP (ref 40–120)
ALT FLD-CCNC: 72 U/L — HIGH (ref 4–41)
ALT FLD-CCNC: 84 U/L — HIGH (ref 4–41)
ANION GAP SERPL CALC-SCNC: 8 MMOL/L — SIGNIFICANT CHANGE UP (ref 7–14)
ANION GAP SERPL CALC-SCNC: 9 MMOL/L — SIGNIFICANT CHANGE UP (ref 7–14)
APTT BLD: 74.5 SEC — HIGH (ref 27–36.3)
APTT BLD: 75.6 SEC — HIGH (ref 27–36.3)
AST SERPL-CCNC: 218 U/L — HIGH (ref 4–40)
AST SERPL-CCNC: 233 U/L — HIGH (ref 4–40)
BASOPHILS # BLD AUTO: 0.09 K/UL — SIGNIFICANT CHANGE UP (ref 0–0.2)
BASOPHILS NFR BLD AUTO: 0.5 % — SIGNIFICANT CHANGE UP (ref 0–2)
BILIRUB SERPL-MCNC: 0.4 MG/DL — SIGNIFICANT CHANGE UP (ref 0.2–1.2)
BILIRUB SERPL-MCNC: 0.5 MG/DL — SIGNIFICANT CHANGE UP (ref 0.2–1.2)
BUN SERPL-MCNC: 13 MG/DL — SIGNIFICANT CHANGE UP (ref 7–23)
BUN SERPL-MCNC: 18 MG/DL — SIGNIFICANT CHANGE UP (ref 7–23)
CALCIUM SERPL-MCNC: 7.9 MG/DL — LOW (ref 8.4–10.5)
CALCIUM SERPL-MCNC: 7.9 MG/DL — LOW (ref 8.4–10.5)
CHLORIDE SERPL-SCNC: 104 MMOL/L — SIGNIFICANT CHANGE UP (ref 98–107)
CHLORIDE SERPL-SCNC: 99 MMOL/L — SIGNIFICANT CHANGE UP (ref 98–107)
CK MB CFR SERPL CALC: 17.8 NG/ML — HIGH
CK SERPL-CCNC: HIGH U/L (ref 30–200)
CO2 SERPL-SCNC: 25 MMOL/L — SIGNIFICANT CHANGE UP (ref 22–31)
CO2 SERPL-SCNC: 30 MMOL/L — SIGNIFICANT CHANGE UP (ref 22–31)
CREAT SERPL-MCNC: 0.91 MG/DL — SIGNIFICANT CHANGE UP (ref 0.5–1.3)
CREAT SERPL-MCNC: 0.99 MG/DL — SIGNIFICANT CHANGE UP (ref 0.5–1.3)
CULTURE RESULTS: SIGNIFICANT CHANGE UP
EGFR: 101 ML/MIN/1.73M2 — SIGNIFICANT CHANGE UP
EGFR: 92 ML/MIN/1.73M2 — SIGNIFICANT CHANGE UP
EOSINOPHIL # BLD AUTO: 0.33 K/UL — SIGNIFICANT CHANGE UP (ref 0–0.5)
EOSINOPHIL NFR BLD AUTO: 2 % — SIGNIFICANT CHANGE UP (ref 0–6)
GLUCOSE BLDC GLUCOMTR-MCNC: 97 MG/DL — SIGNIFICANT CHANGE UP (ref 70–99)
GLUCOSE SERPL-MCNC: 123 MG/DL — HIGH (ref 70–99)
GLUCOSE SERPL-MCNC: 140 MG/DL — HIGH (ref 70–99)
HCT VFR BLD CALC: 34.7 % — LOW (ref 39–50)
HGB BLD-MCNC: 11.6 G/DL — LOW (ref 13–17)
IANC: 13.09 K/UL — HIGH (ref 1.8–7.4)
IMM GRANULOCYTES NFR BLD AUTO: 0.5 % — SIGNIFICANT CHANGE UP (ref 0–0.9)
LYMPHOCYTES # BLD AUTO: 0.98 K/UL — LOW (ref 1–3.3)
LYMPHOCYTES # BLD AUTO: 6 % — LOW (ref 13–44)
MAGNESIUM SERPL-MCNC: 1.8 MG/DL — SIGNIFICANT CHANGE UP (ref 1.6–2.6)
MAGNESIUM SERPL-MCNC: 1.9 MG/DL — SIGNIFICANT CHANGE UP (ref 1.6–2.6)
MCHC RBC-ENTMCNC: 29.3 PG — SIGNIFICANT CHANGE UP (ref 27–34)
MCHC RBC-ENTMCNC: 33.4 GM/DL — SIGNIFICANT CHANGE UP (ref 32–36)
MCV RBC AUTO: 87.6 FL — SIGNIFICANT CHANGE UP (ref 80–100)
MONOCYTES # BLD AUTO: 1.8 K/UL — HIGH (ref 0–0.9)
MONOCYTES NFR BLD AUTO: 11 % — SIGNIFICANT CHANGE UP (ref 2–14)
NEUTROPHILS # BLD AUTO: 13.09 K/UL — HIGH (ref 1.8–7.4)
NEUTROPHILS NFR BLD AUTO: 80 % — HIGH (ref 43–77)
NRBC # BLD: 0 /100 WBCS — SIGNIFICANT CHANGE UP (ref 0–0)
NRBC # FLD: 0 K/UL — SIGNIFICANT CHANGE UP (ref 0–0)
PHOSPHATE SERPL-MCNC: 2.5 MG/DL — SIGNIFICANT CHANGE UP (ref 2.5–4.5)
PHOSPHATE SERPL-MCNC: 2.9 MG/DL — SIGNIFICANT CHANGE UP (ref 2.5–4.5)
PLATELET # BLD AUTO: 123 K/UL — LOW (ref 150–400)
POTASSIUM SERPL-MCNC: 3.7 MMOL/L — SIGNIFICANT CHANGE UP (ref 3.5–5.3)
POTASSIUM SERPL-MCNC: 4 MMOL/L — SIGNIFICANT CHANGE UP (ref 3.5–5.3)
POTASSIUM SERPL-SCNC: 3.7 MMOL/L — SIGNIFICANT CHANGE UP (ref 3.5–5.3)
POTASSIUM SERPL-SCNC: 4 MMOL/L — SIGNIFICANT CHANGE UP (ref 3.5–5.3)
PROT SERPL-MCNC: 5.3 G/DL — LOW (ref 6–8.3)
PROT SERPL-MCNC: 5.7 G/DL — LOW (ref 6–8.3)
RBC # BLD: 3.96 M/UL — LOW (ref 4.2–5.8)
RBC # FLD: 13.6 % — SIGNIFICANT CHANGE UP (ref 10.3–14.5)
SODIUM SERPL-SCNC: 137 MMOL/L — SIGNIFICANT CHANGE UP (ref 135–145)
SODIUM SERPL-SCNC: 138 MMOL/L — SIGNIFICANT CHANGE UP (ref 135–145)
SPECIMEN SOURCE: SIGNIFICANT CHANGE UP
VANCOMYCIN TROUGH SERPL-MCNC: 17.5 UG/ML — SIGNIFICANT CHANGE UP (ref 10–20)
WBC # BLD: 16.38 K/UL — HIGH (ref 3.8–10.5)
WBC # FLD AUTO: 16.38 K/UL — HIGH (ref 3.8–10.5)

## 2022-12-04 PROCEDURE — 99291 CRITICAL CARE FIRST HOUR: CPT | Mod: GC

## 2022-12-04 PROCEDURE — 95718 EEG PHYS/QHP 2-12 HR W/VEEG: CPT

## 2022-12-04 RX ORDER — FUROSEMIDE 40 MG
40 TABLET ORAL EVERY 8 HOURS
Refills: 0 | Status: DISCONTINUED | OUTPATIENT
Start: 2022-12-04 | End: 2022-12-05

## 2022-12-04 RX ORDER — APIXABAN 2.5 MG/1
5 TABLET, FILM COATED ORAL EVERY 12 HOURS
Refills: 0 | Status: DISCONTINUED | OUTPATIENT
Start: 2022-12-04 | End: 2022-12-04

## 2022-12-04 RX ORDER — HEPARIN SODIUM 5000 [USP'U]/ML
4000 INJECTION INTRAVENOUS; SUBCUTANEOUS EVERY 6 HOURS
Refills: 0 | Status: DISCONTINUED | OUTPATIENT
Start: 2022-12-04 | End: 2022-12-04

## 2022-12-04 RX ORDER — FUROSEMIDE 40 MG
40 TABLET ORAL ONCE
Refills: 0 | Status: COMPLETED | OUTPATIENT
Start: 2022-12-04 | End: 2022-12-04

## 2022-12-04 RX ORDER — HEPARIN SODIUM 5000 [USP'U]/ML
8000 INJECTION INTRAVENOUS; SUBCUTANEOUS EVERY 6 HOURS
Refills: 0 | Status: DISCONTINUED | OUTPATIENT
Start: 2022-12-04 | End: 2022-12-04

## 2022-12-04 RX ORDER — HEPARIN SODIUM 5000 [USP'U]/ML
1300 INJECTION INTRAVENOUS; SUBCUTANEOUS
Qty: 25000 | Refills: 0 | Status: DISCONTINUED | OUTPATIENT
Start: 2022-12-04 | End: 2022-12-04

## 2022-12-04 RX ORDER — ONDANSETRON 8 MG/1
4 TABLET, FILM COATED ORAL EVERY 8 HOURS
Refills: 0 | Status: DISCONTINUED | OUTPATIENT
Start: 2022-12-04 | End: 2022-12-06

## 2022-12-04 RX ORDER — LANOLIN ALCOHOL/MO/W.PET/CERES
3 CREAM (GRAM) TOPICAL AT BEDTIME
Refills: 0 | Status: DISCONTINUED | OUTPATIENT
Start: 2022-12-04 | End: 2022-12-06

## 2022-12-04 RX ORDER — POTASSIUM CHLORIDE 20 MEQ
40 PACKET (EA) ORAL ONCE
Refills: 0 | Status: COMPLETED | OUTPATIENT
Start: 2022-12-04 | End: 2022-12-04

## 2022-12-04 RX ORDER — HEPARIN SODIUM 5000 [USP'U]/ML
8000 INJECTION INTRAVENOUS; SUBCUTANEOUS EVERY 6 HOURS
Refills: 0 | Status: DISCONTINUED | OUTPATIENT
Start: 2022-12-04 | End: 2022-12-05

## 2022-12-04 RX ORDER — ONDANSETRON 8 MG/1
4 TABLET, FILM COATED ORAL ONCE
Refills: 0 | Status: COMPLETED | OUTPATIENT
Start: 2022-12-04 | End: 2022-12-04

## 2022-12-04 RX ORDER — HEPARIN SODIUM 5000 [USP'U]/ML
1300 INJECTION INTRAVENOUS; SUBCUTANEOUS
Qty: 25000 | Refills: 0 | Status: DISCONTINUED | OUTPATIENT
Start: 2022-12-04 | End: 2022-12-05

## 2022-12-04 RX ORDER — HEPARIN SODIUM 5000 [USP'U]/ML
4000 INJECTION INTRAVENOUS; SUBCUTANEOUS EVERY 6 HOURS
Refills: 0 | Status: DISCONTINUED | OUTPATIENT
Start: 2022-12-04 | End: 2022-12-05

## 2022-12-04 RX ORDER — HEPARIN SODIUM 5000 [USP'U]/ML
8000 INJECTION INTRAVENOUS; SUBCUTANEOUS ONCE
Refills: 0 | Status: DISCONTINUED | OUTPATIENT
Start: 2022-12-04 | End: 2022-12-04

## 2022-12-04 RX ORDER — FENTANYL CITRATE 50 UG/ML
50 INJECTION INTRAVENOUS ONCE
Refills: 0 | Status: DISCONTINUED | OUTPATIENT
Start: 2022-12-04 | End: 2022-12-04

## 2022-12-04 RX ORDER — DEXMEDETOMIDINE HYDROCHLORIDE IN 0.9% SODIUM CHLORIDE 4 UG/ML
0.2 INJECTION INTRAVENOUS
Qty: 400 | Refills: 0 | Status: DISCONTINUED | OUTPATIENT
Start: 2022-12-04 | End: 2022-12-04

## 2022-12-04 RX ADMIN — HEPARIN SODIUM 1300 UNIT(S)/HR: 5000 INJECTION INTRAVENOUS; SUBCUTANEOUS at 23:58

## 2022-12-04 RX ADMIN — Medication 40 MILLIGRAM(S): at 09:52

## 2022-12-04 RX ADMIN — Medication 40 MILLIEQUIVALENT(S): at 15:06

## 2022-12-04 RX ADMIN — ONDANSETRON 4 MILLIGRAM(S): 8 TABLET, FILM COATED ORAL at 14:15

## 2022-12-04 RX ADMIN — Medication 250 MILLIGRAM(S): at 04:23

## 2022-12-04 RX ADMIN — Medication 8.97 MICROGRAM(S)/KG/MIN: at 07:40

## 2022-12-04 RX ADMIN — CHLORHEXIDINE GLUCONATE 15 MILLILITER(S): 213 SOLUTION TOPICAL at 05:18

## 2022-12-04 RX ADMIN — SODIUM CHLORIDE 250 MILLILITER(S): 9 INJECTION, SOLUTION INTRAVENOUS at 23:58

## 2022-12-04 RX ADMIN — Medication 40 MILLIGRAM(S): at 15:10

## 2022-12-04 RX ADMIN — CEFTRIAXONE 100 MILLIGRAM(S): 500 INJECTION, POWDER, FOR SOLUTION INTRAMUSCULAR; INTRAVENOUS at 05:18

## 2022-12-04 RX ADMIN — FENTANYL CITRATE 50 MICROGRAM(S): 50 INJECTION INTRAVENOUS at 03:14

## 2022-12-04 RX ADMIN — Medication 250 MILLIGRAM(S): at 11:09

## 2022-12-04 RX ADMIN — DEXMEDETOMIDINE HYDROCHLORIDE IN 0.9% SODIUM CHLORIDE 4.79 MICROGRAM(S)/KG/HR: 4 INJECTION INTRAVENOUS at 07:39

## 2022-12-04 RX ADMIN — CHLORHEXIDINE GLUCONATE 15 MILLILITER(S): 213 SOLUTION TOPICAL at 17:10

## 2022-12-04 RX ADMIN — FENTANYL CITRATE 50 MICROGRAM(S): 50 INJECTION INTRAVENOUS at 03:31

## 2022-12-04 RX ADMIN — SODIUM CHLORIDE 250 MILLILITER(S): 9 INJECTION, SOLUTION INTRAVENOUS at 08:07

## 2022-12-04 RX ADMIN — Medication 40 MILLIGRAM(S): at 22:56

## 2022-12-04 RX ADMIN — Medication 40 MILLIGRAM(S): at 02:21

## 2022-12-04 NOTE — CHART NOTE - NSCHARTNOTEFT_GEN_A_CORE
MICU DOWN GRADE NOTE      Patient is a 52y old  Male who presents with a chief complaint of Unresponsive (04 Dec 2022 07:55)      HPI: 52-year-old male with a PMHx significant for TIAs (2011, 2013), CVAs x3 (02/2022 s/p tPA, 8/3/2022 s/p tPA, 11/5/2022 with residual expressive aphasia) on Eliquis, and HLD BIBEMS after being found unresponsive at home. Per patient's 2 friends in the ED, patient was last spoken to around noon on 12/1. No one had heard from him or seen him since yesterday, was not responding to calls this am, so they went to check on him today and they found him unresponsive at home with head on floor turned to the side and one leg was on the bed. Patient was snoring per his friends. Friends called EMS. EMS states he was grunting but minimally responsive. Patient arrived to the ER covered in dried blood in his mouth with multiple ecchymosis on the left side of his face, neck,, chest, arm, thigh, LE. Patient was obtunded. Friend denies hx of ETOH or drug use. Of note, patient had a questionable ASD/PFO with possible closure on 11/22/22; per patient's friends no PFO was found and no closure was performed by Dr. Lynn.  Per outpatient records, patient previously on testosterone transdermal solution which was stopped on 11/15 by his urologist. Per patient's friends at bedside, patient was recently prescribed a mail-order testosterone replacement, unsure of the name and unsure if he received and started the medication. At baseline, patient is reported to be AOx3, able to ambulate without assistance, and takes care of ADLs without assistance.     On arrival in the ED, patient with rectal temp 105.4 F, tachy to 130s, hypertensive to 180/110, and tachypneic to 30. C-collar was placed. Patient was intubated in the ED for airway protection with etomidate and succinylcholine. Patient sedated with propofol and fentanyl. Started on vanco and zosyn. 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 CT cervical spine and maxillofacial scans negative. CT chest, abdomen, and pelvis w/ contrast concerning for possible partial SBO without transition point. Surgery consulted in ED, no acute surgical intervention. CT thoracic and lumbar spine showing degenerative disc disease T6-T7 through L4-L5 and mild disc bulges at L2-L3 through L4-L5 that flatten the ventral thecal sac and narrowing of the bilateral neural foramina.    Patient was admitted to the MICU for further care.     INTERVAL HPI/OVERNIGHT EVENTS: Patient was extubated 12/4/22. Video EEG with no identified seizure activity but did demonstrate cognitive slowing. Follows Dr. Forde OP neurology who has seen him in the hospital. Patient AOx2 (person, place) and following commands. States he fell at home, but unable to give further information. Patient off of pressors 12/4.     MEDICATIONS:  acetaminophen   IVPB .. 1000 milliGRAM(s) IV Intermittent once  aspirin enteric coated 81 milliGRAM(s) Oral daily  chlorhexidine 0.12% Liquid 15 milliLiter(s) Oral Mucosa every 12 hours  chlorhexidine 4% Liquid 1 Application(s) Topical <User Schedule>  dexMEDEtomidine Infusion 0.2 MICROgram(s)/kG/Hr IV Continuous <Continuous>  diphtheria/tetanus/pertussis (acellular) Vaccine (Adacel) 0.5 milliLiter(s) IntraMuscular once  furosemide   Injectable 40 milliGRAM(s) IV Push every 8 hours  heparin   Injectable 8000 Unit(s) IV Push every 6 hours PRN  heparin   Injectable 4000 Unit(s) IV Push every 6 hours PRN  heparin  Infusion. 1300 Unit(s)/Hr IV Continuous <Continuous>  lactated ringers. 1000 milliLiter(s) IV Continuous <Continuous>  norepinephrine Infusion 0.05 MICROgram(s)/kG/Min IV Continuous <Continuous>      T(C): 36.7 (12-04-22 @ 12:00), Max: 37.2 (12-03-22 @ 19:00)  HR: 66 (12-04-22 @ 14:00) (48 - 88)  BP: 130/84 (12-04-22 @ 14:00) (84/53 - 154/87)  RR: 19 (12-04-22 @ 14:00) (7 - 19)  SpO2: 100% (12-04-22 @ 14:00) (98% - 100%)  Wt(kg): --Vital Signs Last 24 Hrs  T(C): 36.7 (04 Dec 2022 12:00), Max: 37.2 (03 Dec 2022 19:00)  T(F): 98 (04 Dec 2022 12:00), Max: 98.9 (03 Dec 2022 19:00)  HR: 66 (04 Dec 2022 14:00) (48 - 88)  BP: 130/84 (04 Dec 2022 14:00) (84/53 - 154/87)  BP(mean): 99 (04 Dec 2022 14:00) (63 - 104)  RR: 19 (04 Dec 2022 14:00) (7 - 19)  SpO2: 100% (04 Dec 2022 14:00) (98% - 100%)    Parameters below as of 04 Dec 2022 13:00  Patient On (Oxygen Delivery Method): nasal cannula  O2 Flow (L/min): 2      PHYSICAL EXAM:  GENERAL: NAD, well-groomed, well-developed  HEAD:  Atraumatic, Normocephalic  EYES: EOMI, PERRLA, conjunctiva and sclera clear  ENMT:  Moist mucous membranes  NECK: Supple, No JVD,  CHEST/LUNG: Clear to auscultation bilaterally; No rales, rhonchi, wheezing, or rubs  HEART: Regular rate and rhythm; No mrg  ABDOMEN: Soft, Nontender, Nondistended; Bowel sounds present  NEURO: Alert & Oriented x2 to person, place, able to follow simple commands, muscle strengh sufficient in 4 extremities, speaks slowly  EXTREMITIES: No LE edema, no calf tenderness  SKIN: Multiple bruising and eccymosis present bilaterally on chest, UE, LE, thighs, etc as noted on admission    Consultant(s) Notes Reviewed:  [x ] YES  [ ] NO  Care Discussed with Consultants/Other Providers [ x] YES  [ ] NO    LABS:                        11.6   16.38 )-----------( 123      ( 04 Dec 2022 00:15 )             34.7     12-04    137  |  99  |  13  ----------------------------<  140<H>  3.7   |  30  |  0.91    Ca    7.9<L>      04 Dec 2022 12:50  Phos  2.5     12-04  Mg     1.90     12-04    TPro  5.7<L>  /  Alb  2.7<L>  /  TBili  0.4  /  DBili  x   /  AST  233<H>  /  ALT  84<H>  /  AlkPhos  56  12-04    PT/INR - ( 02 Dec 2022 20:00 )   PT: 13.3 sec;   INR: 1.14 ratio         PTT - ( 04 Dec 2022 05:00 )  PTT:75.6 sec  Urinalysis Basic - ( 03 Dec 2022 01:45 )    Color: Yellow / Appearance: Turbid / SG: >1.050 / pH: x  Gluc: x / Ketone: Small  / Bili: Negative / Urobili: <2 mg/dL   Blood: x / Protein: 100 mg/dL / Nitrite: Negative   Leuk Esterase: Negative / RBC: 7 /HPF / WBC 14 /HPF   Sq Epi: x / Non Sq Epi: 4 /HPF / Bacteria: Few      CAPILLARY BLOOD GLUCOSE      POCT Blood Glucose.: 97 mg/dL (04 Dec 2022 14:06)  POCT Blood Glucose.: 110 mg/dL (03 Dec 2022 17:21)      ABG - ( 03 Dec 2022 13:04 )  pH, Arterial: 7.38  pH, Blood: x     /  pCO2: 41    /  pO2: 170   / HCO3: 24    / Base Excess: -0.8  /  SaO2: 99.5              Urinalysis Basic - ( 03 Dec 2022 01:45 )    Color: Yellow / Appearance: Turbid / SG: >1.050 / pH: x  Gluc: x / Ketone: Small  / Bili: Negative / Urobili: <2 mg/dL   Blood: x / Protein: 100 mg/dL / Nitrite: Negative   Leuk Esterase: Negative / RBC: 7 /HPF / WBC 14 /HPF   Sq Epi: x / Non Sq Epi: 4 /HPF / Bacteria: Few        RADIOLOGY & ADDITIONAL TESTS:    Imaging Personally Reviewed:  [x ] YES  [ ] NO MICU DOWN GRADE NOTE      Patient is a 52y old  Male who presents with a chief complaint of Unresponsive (04 Dec 2022 07:55)      HPI: 52-year-old male with a PMHx significant for TIAs (2011, 2013), CVAs x3 (02/2022 s/p tPA, 8/3/2022 s/p tPA, 11/5/2022 with residual expressive aphasia) on Eliquis, and HLD BIBEMS after being found unresponsive at home. Per patient's 2 friends in the ED, patient was last spoken to around noon on 12/1. No one had heard from him or seen him since yesterday, was not responding to calls this am, so they went to check on him today and they found him unresponsive at home with head on floor turned to the side and one leg was on the bed. Patient was snoring per his friends. Friends called EMS. EMS states he was grunting but minimally responsive. Patient arrived to the ER covered in dried blood in his mouth with multiple ecchymosis on the left side of his face, neck,, chest, arm, thigh, LE. Patient was obtunded. Friend denies hx of ETOH or drug use. Of note, patient had a questionable ASD/PFO with possible closure on 11/22/22; per patient's friends no PFO was found and no closure was performed by Dr. Lynn.  Per outpatient records, patient previously on testosterone transdermal solution which was stopped on 11/15 by his urologist. Per patient's friends at bedside, patient was recently prescribed a mail-order testosterone replacement, unsure of the name and unsure if he received and started the medication. At baseline, patient is reported to be AOx3, able to ambulate without assistance, and takes care of ADLs without assistance.     On arrival in the ED, patient with rectal temp 105.4 F, tachy to 130s, hypertensive to 180/110, and tachypneic to 30. C-collar was placed. Patient was intubated in the ED for airway protection with etomidate and succinylcholine. Patient sedated with propofol and fentanyl. Started on vanco and zosyn. 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 CT cervical spine and maxillofacial scans negative. CT chest, abdomen, and pelvis w/ contrast concerning for possible partial SBO without transition point. Surgery consulted in ED, no acute surgical intervention. CT thoracic and lumbar spine showing degenerative disc disease T6-T7 through L4-L5 and mild disc bulges at L2-L3 through L4-L5 that flatten the ventral thecal sac and narrowing of the bilateral neural foramina.    Patient was admitted to the MICU for further care.     INTERVAL HPI/OVERNIGHT EVENTS: Patient was extubated 12/4/22. Video EEG with no identified seizure activity but did demonstrate cognitive slowing. Follows Dr. Forde OP neurology who has seen him in the hospital. Patient AOx2 (person, place) and following commands. States he fell at home, but unable to give further information. Patient off of pressors 12/4.     MEDICATIONS:  acetaminophen   IVPB .. 1000 milliGRAM(s) IV Intermittent once  aspirin enteric coated 81 milliGRAM(s) Oral daily  chlorhexidine 0.12% Liquid 15 milliLiter(s) Oral Mucosa every 12 hours  chlorhexidine 4% Liquid 1 Application(s) Topical <User Schedule>  dexMEDEtomidine Infusion 0.2 MICROgram(s)/kG/Hr IV Continuous <Continuous>  diphtheria/tetanus/pertussis (acellular) Vaccine (Adacel) 0.5 milliLiter(s) IntraMuscular once  furosemide   Injectable 40 milliGRAM(s) IV Push every 8 hours  heparin   Injectable 8000 Unit(s) IV Push every 6 hours PRN  heparin   Injectable 4000 Unit(s) IV Push every 6 hours PRN  heparin  Infusion. 1300 Unit(s)/Hr IV Continuous <Continuous>  lactated ringers. 1000 milliLiter(s) IV Continuous <Continuous>  norepinephrine Infusion 0.05 MICROgram(s)/kG/Min IV Continuous <Continuous>      T(C): 36.7 (12-04-22 @ 12:00), Max: 37.2 (12-03-22 @ 19:00)  HR: 66 (12-04-22 @ 14:00) (48 - 88)  BP: 130/84 (12-04-22 @ 14:00) (84/53 - 154/87)  RR: 19 (12-04-22 @ 14:00) (7 - 19)  SpO2: 100% (12-04-22 @ 14:00) (98% - 100%)  Wt(kg): --Vital Signs Last 24 Hrs  T(C): 36.7 (04 Dec 2022 12:00), Max: 37.2 (03 Dec 2022 19:00)  T(F): 98 (04 Dec 2022 12:00), Max: 98.9 (03 Dec 2022 19:00)  HR: 66 (04 Dec 2022 14:00) (48 - 88)  BP: 130/84 (04 Dec 2022 14:00) (84/53 - 154/87)  BP(mean): 99 (04 Dec 2022 14:00) (63 - 104)  RR: 19 (04 Dec 2022 14:00) (7 - 19)  SpO2: 100% (04 Dec 2022 14:00) (98% - 100%)    Parameters below as of 04 Dec 2022 13:00  Patient On (Oxygen Delivery Method): nasal cannula  O2 Flow (L/min): 2      PHYSICAL EXAM:  GENERAL: NAD, well-groomed, well-developed  HEAD:  Atraumatic, Normocephalic  EYES: EOMI, PERRLA, conjunctiva and sclera clear  ENMT:  Moist mucous membranes  NECK: Supple, No JVD,  CHEST/LUNG: Clear to auscultation bilaterally; No rales, rhonchi, wheezing, or rubs  HEART: Regular rate and rhythm; No mrg  ABDOMEN: Soft, Nontender, Nondistended; Bowel sounds present  NEURO: Alert & Oriented x2 to person, place, able to follow simple commands, muscle strengh sufficient in 4 extremities, speaks slowly  EXTREMITIES: No LE edema, no calf tenderness  SKIN: Multiple bruising and eccymosis present bilaterally on chest, UE, LE, thighs, etc as noted on admission    Consultant(s) Notes Reviewed:  [x ] YES  [ ] NO  Care Discussed with Consultants/Other Providers [ x] YES  [ ] NO    LABS:                        11.6   16.38 )-----------( 123      ( 04 Dec 2022 00:15 )             34.7     12-04    137  |  99  |  13  ----------------------------<  140<H>  3.7   |  30  |  0.91    Ca    7.9<L>      04 Dec 2022 12:50  Phos  2.5     12-04  Mg     1.90     12-04    TPro  5.7<L>  /  Alb  2.7<L>  /  TBili  0.4  /  DBili  x   /  AST  233<H>  /  ALT  84<H>  /  AlkPhos  56  12-04    PT/INR - ( 02 Dec 2022 20:00 )   PT: 13.3 sec;   INR: 1.14 ratio         PTT - ( 04 Dec 2022 05:00 )  PTT:75.6 sec  Urinalysis Basic - ( 03 Dec 2022 01:45 )    Color: Yellow / Appearance: Turbid / SG: >1.050 / pH: x  Gluc: x / Ketone: Small  / Bili: Negative / Urobili: <2 mg/dL   Blood: x / Protein: 100 mg/dL / Nitrite: Negative   Leuk Esterase: Negative / RBC: 7 /HPF / WBC 14 /HPF   Sq Epi: x / Non Sq Epi: 4 /HPF / Bacteria: Few      CAPILLARY BLOOD GLUCOSE      POCT Blood Glucose.: 97 mg/dL (04 Dec 2022 14:06)  POCT Blood Glucose.: 110 mg/dL (03 Dec 2022 17:21)      ABG - ( 03 Dec 2022 13:04 )  pH, Arterial: 7.38  pH, Blood: x     /  pCO2: 41    /  pO2: 170   / HCO3: 24    / Base Excess: -0.8  /  SaO2: 99.5              Urinalysis Basic - ( 03 Dec 2022 01:45 )    Color: Yellow / Appearance: Turbid / SG: >1.050 / pH: x  Gluc: x / Ketone: Small  / Bili: Negative / Urobili: <2 mg/dL   Blood: x / Protein: 100 mg/dL / Nitrite: Negative   Leuk Esterase: Negative / RBC: 7 /HPF / WBC 14 /HPF   Sq Epi: x / Non Sq Epi: 4 /HPF / Bacteria: Few        RADIOLOGY & ADDITIONAL TESTS:    Imaging Personally Reviewed:  [x ] YES  [ ] NO    · Assessment	  52-year-old male with a PMHx significant for TIAs (2011, 2013), CVAs x3 (02/2022 s/p tPA, 8/3/2022 s/p tPA, 11/5/2022 with residual expressive aphasia) on Eliquis, and HLD BIBEMS after being found unresponsive at home on the floor, last known well 12/1 at around noon. C-collar was placed. Patient was intubated in the ED for airway protection. 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     NEURO  #AMS- patient extubated 12/4  - Patient found down and unresponsive at home on 12/2, last known well 12/1 at around noon. Baseline reported AOx3.  - DDx: stroke v traumatic (fall?) vs seizure vs metabolic encephalopathy (sepsis?). Hypertension may be indicative of acute stroke and patient has a significant history of 3 prior CVAs this year. Patient febrile to 105.4 F on arrival.   - CTH and CT cervical spine negative for any acute pathologies. Tox screen negative. Urine appears cloudy on exam.   - Patient intubated in ED for airway protection with etomidate and succinylcholine.   - s/p vanc and zosyn, s/p vanco and ceftriaxone with meningitis dosing   - d/c propofol and fentanyl for sedation  - d/c C-collar at this time, patient denies any neck pain  [ ]  f/u MR brain for stroke evaluation, patient has loop recorder but MRI compatible per Radiology     #Seizure workup   - f/u EEG- Moderate to severe nonspecific diffuse or multifocal cerebral dysfunction, no seizures recorded  - EEG d'c'd today in setting of no seizures recorded during 24 hrs Video EEG  [ ] f/u Neuro recs     #CVAs/TIAs  - Hx of TIAs in 2011 and 2013, CVAs x3 in 02/22, 8/22, and 11/22 with residual expressive aphasia   - on Eliquis and Aspirin at home   - c/w ASA  - c/w heparin gtt  - Restart Statin when LFTs and CPK improve per neurology  - Being worked up for Mixed Connective Tissue Disease OP- f/u p-ANCA, c-ANCA  [ ] f/u Neuro recs    #Thecal sac flattening   - CT thoracic and lumbar spine showing degenerative disc disease T6-T7 through L4-L5 and mild disc bulges at L2-L3 through L4-L5 that flatten the ventral thecal sac and narrowing of the bilateral neural foramina  - Will Monitor for now, will consider neuro/neurosurg evaluation in the future    CARDIAC  #HLD  - atorvastatin on hold due to elevated CK and LFTs  - please restart ASAP for secondary stroke prevention    #?ASD/PFO  - Patient reportedly found to have a PFO in February 2022 during a stroke workup at [place]. Patient presented for a PFO closure in November 2022. Notably, no PFO was found at the time and no intervention was performed by Dr. Lynn.    - TTE 11/5/22 EF 57% and grossly normal LV function    #Elevated troponins  - Troponin 197 then 181  [- ECG no acute STEMI    PULM  #Intubated in ED on 12/2  - Intubated for airway protection  - Trend VBGs/ABGs  - Extubated 12/4/22  - saturating well on 2L NC    RENAL  #JOSEFINA  - Cr 1.49 on presentation, previous baseline 0.9 range  - DDx: decreased PO intake x24hrs v sepsis (cloudy urine seen at bedside) v rhabdo  - Trend Cr- .99    #Rhabdomyolysis  - CK 8720 on admission  - DDx: trauma v hyperthermia (T 105.4 F) v sepsis   - Will trend CKs- still above 10,000  - Increased LR rate to 250 cc/hr  - Also on standing Lasix 40 IVP Q8H for now for goal  cc/hr  [ ] Trend CK  [ ] BMP Q12H with Mg, Phos  - further consideration of Lasix should be done based on lab testing    GI   #Partial SBO  - CT chest, abdomen, and pelvis w/ contrast concerning for possible partial SBO without transition point.  - Surgery consulted, no acute intervention at this time  - NGT placed in ED  - NPO for now with NGT to low wall suction  - Monitor for BMs, monitor for abdominal distension  - consider restarting feeds as MS improves    HEME/ONC  #Leukocytosis  - Concern for septic process  - Trend CBCs    ID  #Concern for sepsis  - Patient presented with AMS, T 105.4, tachycardic, and tachypneic   - UA negative, follow up repeat given cloudy urine at bedside   - s/p vanco and zosyn x1 in ED  - c/w vanco, adjust by trough after 4th dose  - s/p ceftriaxone 2g BID given c/f meningitis   - F/u BCx x2, UCx- NGTD  - per neuro, deferring LP for now  - d/c CTX and Vanc for now in setting of no fever and neg cultures, can restart if infection suspicion resurfaces    #Febrile  - Temp 105.4 F on admission  - Afebrile currently    ENDO  - No acute issues    DVT  - Hep gtt    For Follow Up:   [ ] f/u Dr. Forde private Neurologist recs  [ ] f/u MR brain w/ and w.o contrast   [ ] c/w heparin gtt  [ ] Restart Statin when LFTs and CPK improve per neurology  [ ] f/u p-ANCA, c-ANCA (also being worked up for MCTD OP)  - currently on LR @250 cc/hr, Lasix 40 IVP Q8H for UOP goal >200 cc/hr  - CK still >10,000  [ ] Trend CK and Rhabdo labs  [ ] BMP Q12H with Mg, Phos while on diuretics   [ ] further consideration of Lasix and LR should be done based on lab testing per clinical team  [ ] Monitoring off abx right now in the setting of being afebrile, having negative Blood, urine Cx- continue to assess per clinical team MICU DOWN GRADE NOTE    Accepting Physician: Dr. Diez. Signout given.      Patient is a 52y old  Male who presents with a chief complaint of Unresponsive (04 Dec 2022 07:55)      HPI: 52-year-old male with a PMHx significant for TIAs (2011, 2013), CVAs x3 (02/2022 s/p tPA, 8/3/2022 s/p tPA, 11/5/2022 with residual expressive aphasia) on Eliquis, and HLD BIBEMS after being found unresponsive at home. Per patient's 2 friends in the ED, patient was last spoken to around noon on 12/1. No one had heard from him or seen him since yesterday, was not responding to calls this am, so they went to check on him today and they found him unresponsive at home with head on floor turned to the side and one leg was on the bed. Patient was snoring per his friends. Friends called EMS. EMS states he was grunting but minimally responsive. Patient arrived to the ER covered in dried blood in his mouth with multiple ecchymosis on the left side of his face, neck,, chest, arm, thigh, LE. Patient was obtunded. Friend denies hx of ETOH or drug use. Of note, patient had a questionable ASD/PFO with possible closure on 11/22/22; per patient's friends no PFO was found and no closure was performed by Dr. Lynn.  Per outpatient records, patient previously on testosterone transdermal solution which was stopped on 11/15 by his urologist. Per patient's friends at bedside, patient was recently prescribed a mail-order testosterone replacement, unsure of the name and unsure if he received and started the medication. At baseline, patient is reported to be AOx3, able to ambulate without assistance, and takes care of ADLs without assistance.     On arrival in the ED, patient with rectal temp 105.4 F, tachy to 130s, hypertensive to 180/110, and tachypneic to 30. C-collar was placed. Patient was intubated in the ED for airway protection with etomidate and succinylcholine. Patient sedated with propofol and fentanyl. Started on vanco and zosyn. 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 CT cervical spine and maxillofacial scans negative. CT chest, abdomen, and pelvis w/ contrast concerning for possible partial SBO without transition point. Surgery consulted in ED, no acute surgical intervention. CT thoracic and lumbar spine showing degenerative disc disease T6-T7 through L4-L5 and mild disc bulges at L2-L3 through L4-L5 that flatten the ventral thecal sac and narrowing of the bilateral neural foramina.    Patient was admitted to the MICU for further care.     INTERVAL HPI/OVERNIGHT EVENTS: Patient was extubated 12/4/22. Video EEG with no identified seizure activity but did demonstrate cognitive slowing. Follows Dr. Forde OP neurology who has seen him in the hospital. Patient AOx2 (person, place) and following commands. States he fell at home, but unable to give further information. Patient off of pressors 12/4.     MEDICATIONS:  acetaminophen   IVPB .. 1000 milliGRAM(s) IV Intermittent once  aspirin enteric coated 81 milliGRAM(s) Oral daily  chlorhexidine 0.12% Liquid 15 milliLiter(s) Oral Mucosa every 12 hours  chlorhexidine 4% Liquid 1 Application(s) Topical <User Schedule>  dexMEDEtomidine Infusion 0.2 MICROgram(s)/kG/Hr IV Continuous <Continuous>  diphtheria/tetanus/pertussis (acellular) Vaccine (Adacel) 0.5 milliLiter(s) IntraMuscular once  furosemide   Injectable 40 milliGRAM(s) IV Push every 8 hours  heparin   Injectable 8000 Unit(s) IV Push every 6 hours PRN  heparin   Injectable 4000 Unit(s) IV Push every 6 hours PRN  heparin  Infusion. 1300 Unit(s)/Hr IV Continuous <Continuous>  lactated ringers. 1000 milliLiter(s) IV Continuous <Continuous>  norepinephrine Infusion 0.05 MICROgram(s)/kG/Min IV Continuous <Continuous>      T(C): 36.7 (12-04-22 @ 12:00), Max: 37.2 (12-03-22 @ 19:00)  HR: 66 (12-04-22 @ 14:00) (48 - 88)  BP: 130/84 (12-04-22 @ 14:00) (84/53 - 154/87)  RR: 19 (12-04-22 @ 14:00) (7 - 19)  SpO2: 100% (12-04-22 @ 14:00) (98% - 100%)  Wt(kg): --Vital Signs Last 24 Hrs  T(C): 36.7 (04 Dec 2022 12:00), Max: 37.2 (03 Dec 2022 19:00)  T(F): 98 (04 Dec 2022 12:00), Max: 98.9 (03 Dec 2022 19:00)  HR: 66 (04 Dec 2022 14:00) (48 - 88)  BP: 130/84 (04 Dec 2022 14:00) (84/53 - 154/87)  BP(mean): 99 (04 Dec 2022 14:00) (63 - 104)  RR: 19 (04 Dec 2022 14:00) (7 - 19)  SpO2: 100% (04 Dec 2022 14:00) (98% - 100%)    Parameters below as of 04 Dec 2022 13:00  Patient On (Oxygen Delivery Method): nasal cannula  O2 Flow (L/min): 2      PHYSICAL EXAM:  GENERAL: NAD, well-groomed, well-developed  HEAD:  Atraumatic, Normocephalic  EYES: EOMI, PERRLA, conjunctiva and sclera clear  ENMT:  Moist mucous membranes  NECK: Supple, No JVD,  CHEST/LUNG: Clear to auscultation bilaterally; No rales, rhonchi, wheezing, or rubs  HEART: Regular rate and rhythm; No mrg  ABDOMEN: Soft, Nontender, Nondistended; Bowel sounds present  NEURO: Alert & Oriented x2 to person, place, able to follow simple commands, muscle strengh sufficient in 4 extremities, speaks slowly  EXTREMITIES: No LE edema, no calf tenderness  SKIN: Multiple bruising and eccymosis present bilaterally on chest, UE, LE, thighs, etc as noted on admission    Consultant(s) Notes Reviewed:  [x ] YES  [ ] NO  Care Discussed with Consultants/Other Providers [ x] YES  [ ] NO    LABS:                        11.6   16.38 )-----------( 123      ( 04 Dec 2022 00:15 )             34.7     12-04    137  |  99  |  13  ----------------------------<  140<H>  3.7   |  30  |  0.91    Ca    7.9<L>      04 Dec 2022 12:50  Phos  2.5     12-04  Mg     1.90     12-04    TPro  5.7<L>  /  Alb  2.7<L>  /  TBili  0.4  /  DBili  x   /  AST  233<H>  /  ALT  84<H>  /  AlkPhos  56  12-04    PT/INR - ( 02 Dec 2022 20:00 )   PT: 13.3 sec;   INR: 1.14 ratio         PTT - ( 04 Dec 2022 05:00 )  PTT:75.6 sec  Urinalysis Basic - ( 03 Dec 2022 01:45 )    Color: Yellow / Appearance: Turbid / SG: >1.050 / pH: x  Gluc: x / Ketone: Small  / Bili: Negative / Urobili: <2 mg/dL   Blood: x / Protein: 100 mg/dL / Nitrite: Negative   Leuk Esterase: Negative / RBC: 7 /HPF / WBC 14 /HPF   Sq Epi: x / Non Sq Epi: 4 /HPF / Bacteria: Few      CAPILLARY BLOOD GLUCOSE      POCT Blood Glucose.: 97 mg/dL (04 Dec 2022 14:06)  POCT Blood Glucose.: 110 mg/dL (03 Dec 2022 17:21)      ABG - ( 03 Dec 2022 13:04 )  pH, Arterial: 7.38  pH, Blood: x     /  pCO2: 41    /  pO2: 170   / HCO3: 24    / Base Excess: -0.8  /  SaO2: 99.5              Urinalysis Basic - ( 03 Dec 2022 01:45 )    Color: Yellow / Appearance: Turbid / SG: >1.050 / pH: x  Gluc: x / Ketone: Small  / Bili: Negative / Urobili: <2 mg/dL   Blood: x / Protein: 100 mg/dL / Nitrite: Negative   Leuk Esterase: Negative / RBC: 7 /HPF / WBC 14 /HPF   Sq Epi: x / Non Sq Epi: 4 /HPF / Bacteria: Few        RADIOLOGY & ADDITIONAL TESTS:    Imaging Personally Reviewed:  [x ] YES  [ ] NO    · Assessment	  52-year-old male with a PMHx significant for TIAs (2011, 2013), CVAs x3 (02/2022 s/p tPA, 8/3/2022 s/p tPA, 11/5/2022 with residual expressive aphasia) on Eliquis, and HLD BIBEMS after being found unresponsive at home on the floor, last known well 12/1 at around noon. C-collar was placed. Patient was intubated in the ED for airway protection. 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     NEURO  #AMS- patient extubated 12/4  - Patient found down and unresponsive at home on 12/2, last known well 12/1 at around noon. Baseline reported AOx3.  - DDx: stroke v traumatic (fall?) vs seizure vs metabolic encephalopathy (sepsis?). Hypertension may be indicative of acute stroke and patient has a significant history of 3 prior CVAs this year. Patient febrile to 105.4 F on arrival.   - CTH and CT cervical spine negative for any acute pathologies. Tox screen negative. Urine appears cloudy on exam.   - Patient intubated in ED for airway protection with etomidate and succinylcholine.   - s/p vanc and zosyn, s/p vanco and ceftriaxone with meningitis dosing   - d/c propofol and fentanyl for sedation  - d/c C-collar at this time, patient denies any neck pain  [ ]  f/u MR brain for stroke evaluation, patient has loop recorder but MRI compatible per Radiology     #Seizure workup   - f/u EEG- Moderate to severe nonspecific diffuse or multifocal cerebral dysfunction, no seizures recorded  - EEG d'c'd today in setting of no seizures recorded during 24 hrs Video EEG  [ ] f/u Neuro recs     #CVAs/TIAs  - Hx of TIAs in 2011 and 2013, CVAs x3 in 02/22, 8/22, and 11/22 with residual expressive aphasia   - on Eliquis and Aspirin at home   - c/w ASA  - c/w heparin gtt  - Restart Statin when LFTs and CPK improve per neurology  - Being worked up for Mixed Connective Tissue Disease OP- f/u p-ANCA, c-ANCA  [ ] f/u Neuro recs    #Thecal sac flattening   - CT thoracic and lumbar spine showing degenerative disc disease T6-T7 through L4-L5 and mild disc bulges at L2-L3 through L4-L5 that flatten the ventral thecal sac and narrowing of the bilateral neural foramina  - Will Monitor for now, will consider neuro/neurosurg evaluation in the future    CARDIAC  #HLD  - atorvastatin on hold due to elevated CK and LFTs  - please restart ASAP for secondary stroke prevention    #?ASD/PFO  - Patient reportedly found to have a PFO in February 2022 during a stroke workup at [place]. Patient presented for a PFO closure in November 2022. Notably, no PFO was found at the time and no intervention was performed by Dr. Lynn.    - TTE 11/5/22 EF 57% and grossly normal LV function    #Elevated troponins  - Troponin 197 then 181  [- ECG no acute STEMI    PULM  #Intubated in ED on 12/2  - Intubated for airway protection  - Trend VBGs/ABGs  - Extubated 12/4/22  - saturating well on 2L NC    RENAL  #JOSEFINA  - Cr 1.49 on presentation, previous baseline 0.9 range  - DDx: decreased PO intake x24hrs v sepsis (cloudy urine seen at bedside) v rhabdo  - Trend Cr- .99    #Rhabdomyolysis  - CK 8720 on admission  - DDx: trauma v hyperthermia (T 105.4 F) v sepsis   - Will trend CKs- still above 10,000  - Increased LR rate to 250 cc/hr  - Also on standing Lasix 40 IVP Q8H for now for goal  cc/hr  [ ] Trend CK  [ ] BMP Q12H with Mg, Phos  - further consideration of Lasix should be done based on lab testing    GI   #Partial SBO  - CT chest, abdomen, and pelvis w/ contrast concerning for possible partial SBO without transition point.  - Surgery consulted, no acute intervention at this time  - NGT placed in ED  - NPO for now with NGT to low wall suction  - Monitor for BMs, monitor for abdominal distension  - consider restarting feeds as MS improves    HEME/ONC  #Leukocytosis  - Concern for septic process  - Trend CBCs    ID  #Concern for sepsis  - Patient presented with AMS, T 105.4, tachycardic, and tachypneic   - UA negative, follow up repeat given cloudy urine at bedside   - s/p vanco and zosyn x1 in ED  - c/w vanco, adjust by trough after 4th dose  - s/p ceftriaxone 2g BID given c/f meningitis   - F/u BCx x2, UCx- NGTD  - per neuro, deferring LP for now  - d/c CTX and Vanc for now in setting of no fever and neg cultures, can restart if infection suspicion resurfaces    #Febrile  - Temp 105.4 F on admission  - Afebrile currently    ENDO  - No acute issues    DVT  - Hep gtt    For Follow Up:   [ ] f/u Dr. Forde private Neurologist recs  [ ] f/u MR brain w/ and w.o contrast   [ ] c/w heparin gtt  [ ] Restart Statin when LFTs and CPK improve per neurology  [ ] f/u p-ANCA, c-ANCA (also being worked up for MCTD OP)  - currently on LR @250 cc/hr, Lasix 40 IVP Q8H for UOP goal >200 cc/hr  - CK still >10,000  [ ] Trend CK and Rhabdo labs  [ ] BMP Q12H with Mg, Phos while on diuretics   [ ] further consideration of Lasix and LR should be done based on lab testing per clinical team  [ ] Monitoring off abx right now in the setting of being afebrile, having negative Blood, urine Cx- continue to assess per clinical team

## 2022-12-04 NOTE — CHART NOTE - NSCHARTNOTEFT_GEN_A_CORE
EEG preliminary read (not final) on the initial recording hour(s) = x 2    No seizures recorded.  Moderate slowing non specific to etiology    [facility] EEG Reading Room Ph#: [phone]  Epilepsy Answering Service after 5PM and before 8:30AM: Ph#: [phone]

## 2022-12-04 NOTE — CONSULT NOTE ADULT - SUBJECTIVE AND OBJECTIVE BOX
Neurology Consult    Reason for Consult: Patient is a 52y old  Male who presents with a chief complaint of Unresponsive (04 Dec 2022 07:18)      HPI:  52-year-old male with a PMHx significant for TIAs (2011, 2013), CVAs x3 (02/2022 s/p tPA, 8/3/2022 s/p tPA, 11/5/2022 with residual expressive aphasia) on Eliquis, and HLD BIBEMS after being found unresponsive at home. Per patient's 2 friends in the ED, patient was last spoken to around noon on 12/1. No one had heard from him or seen him since yesterday, was not responding to calls this am, so they went to check on him today and they found him unresponsive at home with head on floor turned to the side and one leg was on the bed. Patient was snoring per his friends. Friends called EMS. EMS states he was grunting but minimally responsive. Patient arrived to the ER covered in dried blood in his mouth with multiple ecchymosis on the left side of his face, neck,, chest, and arm. Patient obtunded. Friend denies hx of ETOH or drug use. Of note, patient had a questionable ASD/PFO with possible closure on 11/22/22; per patient's friends no PFO was found and no closure was performed.  Per outpatient records, patient previously on testosterone transdermal solution which was stopped on 11/15 by his urologist. Per patient's friends at bedside, patient was recently prescribed a mail-order testosterone replacement, unsure of the name and unsure if he received and started the medication. At baseline, patient is AOx4, able to ambulate without assistance, and takes care of all ADLs without assistance.     On arrival, patient with rectal temp 105.4 F, tachy to 130s, hypertensive to 180/110, and tachypneic to 30. C-collar was placed. Patient was intubated in the ED for airway protection with etomidate and succinylcholine. Patient sedated with propofol and fentanyl. Started on vanco and zosyn. 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 CT cervical spine and maxillofacial scans negative. CT chest, abdomen, and pelvis w/ contrast concerning for possible partial SBO without transition point. Surgery consulted in ED, no acute surgical intervention at this time. CT thoracic and lumbar spine showing degenerative disc disease T6-T7 through L4-L5 and mild disc bulges at L2-L3 through L4-L5 that flatten the ventral thecal sac and narrowing of the bilateral neural foramina.   (02 Dec 2022 22:45)       PAST MEDICAL & SURGICAL HISTORY:  History of TIAs      CVA (cerebrovascular accident)      HLD (hyperlipidemia)      Vertigo      No significant past surgical history          Allergies: Allergies    No Known Allergies    Intolerances        Social History: Denies toxic habits including tobacco, ETOH or illicit drugs.    Family History: FAMILY HISTORY:  No pertinent family history in first degree relatives    . No family history of strokes    Medications: MEDICATIONS  (STANDING):  acetaminophen   IVPB .. 1000 milliGRAM(s) IV Intermittent once  aspirin enteric coated 81 milliGRAM(s) Oral daily  cefTRIAXone   IVPB 2000 milliGRAM(s) IV Intermittent every 12 hours  chlorhexidine 0.12% Liquid 15 milliLiter(s) Oral Mucosa every 12 hours  chlorhexidine 4% Liquid 1 Application(s) Topical <User Schedule>  dexMEDEtomidine Infusion 0.2 MICROgram(s)/kG/Hr (4.79 mL/Hr) IV Continuous <Continuous>  diphtheria/tetanus/pertussis (acellular) Vaccine (Adacel) 0.5 milliLiter(s) IntraMuscular once  fentaNYL   Infusion. 1.5 MICROgram(s)/kG/Hr (10.5 mL/Hr) IV Continuous <Continuous>  heparin  Infusion. 1300 Unit(s)/Hr (13 mL/Hr) IV Continuous <Continuous>  lactated ringers. 1000 milliLiter(s) (250 mL/Hr) IV Continuous <Continuous>  norepinephrine Infusion 0.05 MICROgram(s)/kG/Min (8.97 mL/Hr) IV Continuous <Continuous>  propofol Infusion 30 MICROgram(s)/kG/Min (12.6 mL/Hr) IV Continuous <Continuous>  vancomycin  IVPB 1000 milliGRAM(s) IV Intermittent every 8 hours    MEDICATIONS  (PRN):  heparin   Injectable 8000 Unit(s) IV Push every 6 hours PRN For aPTT less than 40  heparin   Injectable 4000 Unit(s) IV Push every 6 hours PRN For aPTT between 40 - 57      Review of Systems:  unable given mental status     Vitals:  Vital Signs Last 24 Hrs  T(C): 36.1 (04 Dec 2022 04:00), Max: 37.2 (03 Dec 2022 19:00)  T(F): 97 (04 Dec 2022 04:00), Max: 98.9 (03 Dec 2022 19:00)  HR: 88 (04 Dec 2022 07:45) (54 - 88)  BP: 130/86 (04 Dec 2022 07:45) (84/53 - 154/87)  BP(mean): 99 (04 Dec 2022 07:45) (63 - 101)  RR: 15 (04 Dec 2022 07:45) (10 - 18)  SpO2: 100% (04 Dec 2022 07:45) (98% - 100%)    Parameters below as of 04 Dec 2022 07:00  Patient On (Oxygen Delivery Method): ventilator,cpap        General Exam:   General Appearance: Appropriately dressed and in no acute distress       Head: Normocephalic, atraumatic and no dysmorphic features  Ear, Nose, and Throat: Moist mucous membranes  CVS: S1S2+  Resp: No SOB, no wheeze or rhonchi  GI: soft NT/ND  Extremities: No edema or cyanosis  Skin: No bruises or rashes     Neurological Exam: (intubated sedated on fentanyl and precedex)   Mental Status: responds to voice and follows commands x 4   Cranial Nerves: PERRL, EOMI, VFFC, sensation V1-V3 intact,  no obvious facial asymmetry, equal elevation of palate, scm/trap 5/5, tongue is midline on protrusion. no obvious papilledema on fundoscopic exam. hearing is grossly intact.   Motor:: MILLER at least 4/5 at present   Sensation: Intact to light touch and pinprick throughout.   Reflexes: 1+ throughout at biceps, brachioradialis, triceps, patellars and ankles bilaterally and equal. No clonus. R toe and L toe were both downgoing.  Coordination: unable   Gait: unable     Data/Labs/Imaging which I personally reviewed.     Labs:     CBC Full  -  ( 04 Dec 2022 00:15 )  WBC Count : 16.38 K/uL  RBC Count : 3.96 M/uL  Hemoglobin : 11.6 g/dL  Hematocrit : 34.7 %  Platelet Count - Automated : 123 K/uL  Mean Cell Volume : 87.6 fL  Mean Cell Hemoglobin : 29.3 pg  Mean Cell Hemoglobin Concentration : 33.4 gm/dL  Auto Neutrophil # : 13.09 K/uL  Auto Lymphocyte # : 0.98 K/uL  Auto Monocyte # : 1.80 K/uL  Auto Eosinophil # : 0.33 K/uL  Auto Basophil # : 0.09 K/uL  Auto Neutrophil % : 80.0 %  Auto Lymphocyte % : 6.0 %  Auto Monocyte % : 11.0 %  Auto Eosinophil % : 2.0 %  Auto Basophil % : 0.5 %    12-04    138  |  104  |  18  ----------------------------<  123<H>  4.0   |  25  |  0.99    Ca    7.9<L>      04 Dec 2022 00:15  Phos  2.5     12-04  Mg     1.90     12-04    TPro  5.3<L>  /  Alb  2.6<L>  /  TBili  0.5  /  DBili  x   /  AST  218<H>  /  ALT  72<H>  /  AlkPhos  53  12-04    LIVER FUNCTIONS - ( 04 Dec 2022 00:15 )  Alb: 2.6 g/dL / Pro: 5.3 g/dL / ALK PHOS: 53 U/L / ALT: 72 U/L / AST: 218 U/L / GGT: x           PT/INR - ( 02 Dec 2022 20:00 )   PT: 13.3 sec;   INR: 1.14 ratio         PTT - ( 04 Dec 2022 05:00 )  PTT:75.6 sec  Urinalysis Basic - ( 03 Dec 2022 01:45 )    Color: Yellow / Appearance: Turbid / SG: >1.050 / pH: x  Gluc: x / Ketone: Small  / Bili: Negative / Urobili: <2 mg/dL   Blood: x / Protein: 100 mg/dL / Nitrite: Negative   Leuk Esterase: Negative / RBC: 7 /HPF / WBC 14 /HPF   Sq Epi: x / Non Sq Epi: 4 /HPF / Bacteria: Few    < from: CT Head No Cont (12.02.22 @ 20:27) >    ACC: [id number] EXAM:  CT CERVICAL SPINE                        ACC: [id number] EXAM:  CT MAXILLOFACIAL                        ACC: [id number] EXAM:  CT BRAIN                          PROCEDURE DATE:  12/02/2022          INTERPRETATION:  CLINICAL INDICATION: Trauma.    Technique: Noncontrast axial CT of the head, facial bones, and cervical   spine was performed. 3-D reformats of the facial bones was obtained.   Coronal and sagittal reformats were obtained.      COMPARISON: None.    FINDINGS:  Head CT:  The ventricles and sulci are within normal limits. 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 There is no intraparenchymal hematoma, mass effect   or midline shift. No abnormal extra-axial fluid collections or   hemorrhages are present.    There is swelling of the left lateral scalp. The calvarium is intact.   There are scattered mucosal inflammatory changes in the paranasal sinuses.      Cervical spine CT:  Alignment ismaintained. Vertebral bodies are normal in height, without   evidence of fracture or dislocation. Prevertebral soft tissues are within   normal limits without soft tissue swelling or hematoma.    Intervertebral discs are intact. Neural foramina and spinal canal are   intact.    The visualized lung apices are within normal limits.      Facial bone CT:    No acute facial fracture.    There are scattered mucosal inflammatory changes in the paranasal   sinuses. Mastoid air cells are clear.    Soft tissues appear unremarkable.        IMPRESSION:  CT HEAD: No acute abnormality.  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  CT CERVICAL SPINE: No acute abnormality  CT FACIAL BONE: No acute abnormality    --- End of Report ---            DELGADO IVAN MD; Attending Radiologist  This document has been electronically signed. Dec  2 2022  9:02PM    < end of copied text >  < from: CT Lumbar Spine No Cont (12.02.22 @ 20:27) >    ACC: [id number] EXAM:  CT LUMBAR SPINE                        ACC: [id number] EXAM:  CT THORACIC SPINE                          PROCEDURE DATE:  12/02/2022          INTERPRETATION:  CT thoracic and lumbar spine without IV contrast    CLINICAL INFORMATION:  Trauma  Back pain, fracture.    TECHNIQUE:  Contiguous axial 2 mm sections were obtained through the   thoracic and lumbar spine using a single helical acquisition.     Additional 2 mm sagittal and coronal reconstructions of the spine were   obtained. These additional reformatted images were used to evaluate the   spine for alignment, vertebral fractures and the integrity of the the   posterior elements.   This scan was performed using automatic exposure   control (radiation dose reduction software) to obtain a diagnostic image   quality scan with patient dose as low as reasonably achievable.    FINDINGS:   No prior similar studies are available for review    Thoracic and lumbar vertebral body heights are maintained. No vertebral   fracture is seen. No destructive bone lesion is found.  Alignment is   preserved.  Facet joints appear intact and aligned.    Thoracic and lumbar intervertebral disc spaces appear intact.   Degenerative disc disease and spondylosis is noted at T6-T7 throughL4-5   with loss of disc height and associated degenerative endplate changes.   Mild disc bulges at L2-3 through L4-5 flatten the ventral thecal sac and   narrow the BILATERAL neural foramina.    No paraspinal mass is recognized.  Paraspinal soft tissues appear intact.      IMPRESSION:  Degenerative disc disease and spondylosis is noted at T6-T7   through L4-5 with loss of disc height and associated degenerative   endplate changes. Mild disc bulges at L2-3 through L4-5 flatten the   ventral thecal sac and narrow the BILATERAL neural foramina   No   vertebral fracture is recognized.    --- End of Report ---            ANGIE ESCOBAR MD; Attending Radiologist  This document has been electronically signed. Dec  2 2022  8:55PM    < end of copied text >

## 2022-12-04 NOTE — PROGRESS NOTE ADULT - ASSESSMENT
52-year-old male with a PMHx significant for TIAs (2011, 2013), CVAs x3 (02/2022 s/p tPA, 8/3/2022 s/p tPA, 11/5/2022 with residual expressive aphasia) on Eliquis, and HLD BIBEMS after being found unresponsive at home on the floor, last known well 12/1 at around noon. C-collar was placed. Patient was intubated in the ED for airway protection. 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     NEURO  #AMS, Intubated and Sedated now  - Patient found down and unresponsive at home on 12/2, last known well 12/1 at around noon. Baseline AOx4.  - DDx: stroke v traumatic (fall?) vs seizure vs metabolic encephalopathy (sepsis?). Hypertension may be indicative of acute stroke and patient has a significant history of 3 prior CVAs this year. Patient febrile to 105.4 F on arrival.   - CTH and CT cervical spine negative for any acute pathologies. Tox screen negative. Urine appears cloudy on exam.   - Patient intubated in ED for airway protection with etomidate and succinylcholine.   - s/p vanc and zosyn, will switch to vanco and ceftriaxone with meningitis dosing   - c/w propofol and fentanyl for sedation  - c/w C-collar at this time  - f/u MR brain for stroke evaluation, patient has loop recorder  - f/u EEG. If negative for acute seizure, can attempt to wake him up    #CVAs/TIAs  - Hx of TIAs in 2011 and 2013, CVAs x3 in 02/22, 8/22, and 11/22 with residual expressive aphasia   - on Eliquis and Aspirin at home   - c/w ASA  - start heparin gtt  [ ] f/u Neuro recs    #Thecal sac flattening   - CT thoracic and lumbar spine showing degenerative disc disease T6-T7 through L4-L5 and mild disc bulges at L2-L3 through L4-L5 that flatten the ventral thecal sac and narrowing of the bilateral neural foramina  - Will Monitor for now, will consider neuro/neurosurg evaluation in the future    CARDIAC  #HLD  - atorvastatin on hold iso partial SBO    #?ASD/PFO  - Patient reportedly found to have a PFO in February 2022 during a stroke workup. Patient presented for a PFO closure in November 2022. Notably, no PFO was found at the time and no intervention was performed.   - TTE 11/5/22 EF 57% and grossly normal LV function    #Elevated troponins  - Troponin 197 then 181  [ ] f/u ECG    PULM  #Intubated in ED on 12/2  - Intubated for airway protection  - Trend VBGs/ABGs    RENAL  #JOSEFINA  - Cr 1.49 on presentation, previous baseline 0.9 range  - DDx: decreased PO intake x24hrs v sepsis (cloudy urine seen at bedside) v rhabdo  - Trend Cr    #Rhabdomyolysis  - CK 8720 on admission  - DDx: trauma v hyperthermia (T 105.4 F) v sepsis   - Will trend CKs  - Increased LR rate to 300 cc/hr    GI   #Partial SBO  - CT chest, abdomen, and pelvis w/ contrast concerning for possible partial SBO without transition point.  - Surgery consulted, no acute intervention at this time  - OGT placed in ED  - NPO for now with OGt to low wall suction  - Monitor for BMs, monitor for abdominal distension  - consider starting feeds later if no output noted    HEME/ONC  #Leukocytosis  - Concern for septic process  - Trend CBCs    ID  #Concern for sepsis  - Patient presented with AMS, T 105.4, tachycardic, and tachypneic   - UA negative, follow up repeat given cloudy urine at bedside   - s/p vanco and zosyn x1 in ED  - c/w vanco, adjust by trough after 4th dose  - start ceftriaxone 2g BID given c/f meningitis   - F/u BCx x2, UCx, procal  - can obtain LP on Monday if remains febrile and infection considered    #Febrile  - Temp 105.4 F on admission  - will give IV acetaminophen 1g x1 now  - Trend fever curve, can consider further acetaminophen and cooling blankets as needed     ENDO  - No acute issues    DVT  - Hep gtt 52-year-old male with a PMHx significant for TIAs (2011, 2013), CVAs x3 (02/2022 s/p tPA, 8/3/2022 s/p tPA, 11/5/2022 with residual expressive aphasia) on Eliquis, and HLD BIBEMS after being found unresponsive at home on the floor, last known well 12/1 at around noon. C-collar was placed. Patient was intubated in the ED for airway protection. 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     NEURO  #AMS- patient extubated 12/4  - Patient found down and unresponsive at home on 12/2, last known well 12/1 at around noon. Baseline AOx3.  - DDx: stroke v traumatic (fall?) vs seizure vs metabolic encephalopathy (sepsis?). Hypertension may be indicative of acute stroke and patient has a significant history of 3 prior CVAs this year. Patient febrile to 105.4 F on arrival.   - CTH and CT cervical spine negative for any acute pathologies. Tox screen negative. Urine appears cloudy on exam.   - Patient intubated in ED for airway protection with etomidate and succinylcholine.   - s/p vanc and zosyn, s/p vanco and ceftriaxone with meningitis dosing   - d/c propofol and fentanyl for sedation  - d/c C-collar at this time, patient denies any neck pain  [ ]  f/u MR brain for stroke evaluation, patient has loop recorder but MRI compatible per Radiology     #Seizure workup   - f/u EEG- Moderate to severe nonspecific diffuse or multifocal cerebral dysfunction, no seizures recorded  [ ] f/u Neuro recs     #CVAs/TIAs  - Hx of TIAs in 2011 and 2013, CVAs x3 in 02/22, 8/22, and 11/22 with residual expressive aphasia   - on Eliquis and Aspirin at home   - c/w ASA  - c/w heparin gtt  - Restart Statin when LFTs and CPK improve per neurology  - Being worked up for Mixed Connective Tissue Disease OP- f/u p-ANCA, c-ANCA  [ ] f/u Neuro recs    #Thecal sac flattening   - CT thoracic and lumbar spine showing degenerative disc disease T6-T7 through L4-L5 and mild disc bulges at L2-L3 through L4-L5 that flatten the ventral thecal sac and narrowing of the bilateral neural foramina  - Will Monitor for now, will consider neuro/neurosurg evaluation in the future    CARDIAC  #HLD  - atorvastatin on hold due to elevated CK and LFTs  - please restart ASAP for secondary stroke prevention    #?ASD/PFO  - Patient reportedly found to have a PFO in February 2022 during a stroke workup at [facility]. Patient presented for a PFO closure in November 2022. Notably, no PFO was found at the time and no intervention was performed by Dr. Lynn.    - TTE 11/5/22 EF 57% and grossly normal LV function    #Elevated troponins  - Troponin 197 then 181  [- ECG no acute STEMI    PULM  #Intubated in ED on 12/2  - Intubated for airway protection  - Trend VBGs/ABGs  - Extubated 12/4/22  - saturating well on 2L NC    RENAL  #JOSEFINA  - Cr 1.49 on presentation, previous baseline 0.9 range  - DDx: decreased PO intake x24hrs v sepsis (cloudy urine seen at bedside) v rhabdo  - Trend Cr- .99    #Rhabdomyolysis  - CK 8720 on admission  - DDx: trauma v hyperthermia (T 105.4 F) v sepsis   - Will trend CKs- still above 10,000  - Increased LR rate to 250 cc/hr  - Also on standing Lasix 40 IVP Q8H for now for goal  cc/hr  [ ] Trend CK  [ ] BMP Q12H with Mg, Phos  - further consideration of Lasix should be done based on lab testing    GI   #Partial SBO  - CT chest, abdomen, and pelvis w/ contrast concerning for possible partial SBO without transition point.  - Surgery consulted, no acute intervention at this time  - NGT placed in ED  - NPO for now with NGT to low wall suction  - Monitor for BMs, monitor for abdominal distension  - consider restarting feeds as MS improves    HEME/ONC  #Leukocytosis  - Concern for septic process  - Trend CBCs    ID  #Concern for sepsis  - Patient presented with AMS, T 105.4, tachycardic, and tachypneic   - UA negative, follow up repeat given cloudy urine at bedside   - s/p vanco and zosyn x1 in ED  - c/w vanco, adjust by trough after 4th dose  - s/p ceftriaxone 2g BID given c/f meningitis   - F/u BCx x2, UCx- NGTD  - per neuro, deferring LP for now  - d/c CTX and Vanc for now in setting of no fever and neg cultures, can restart if infection suspicion resurfaces    #Febrile  - Temp 105.4 F on admission  - Afebrile currently    ENDO  - No acute issues    DVT  - Hep gtt

## 2022-12-04 NOTE — PROGRESS NOTE ADULT - ATTENDING COMMENTS
This is a 51 y/o M with PMHx of multiple cerebrovascular events (3 in the past year) on eliquis who presents after being found down at home with multiple abrasions and ecchymoses on various parts of his body for. Brought to the ED and intubated for airway protection. Febrile to 105 in ED. Also found to have JOSEFINA and rhabdomyolysis.    1) Acute hypoxemic respiratory failure - Resolved. Ready for extubation today.  2) Altered mental status - Unclear etiology at this time. CT head unchanged from prior. EEG negative for seizures. D/c EEG. Patient is now awake and alert and answering questions appropriately. Reports he "fell" at home. Low suspicion for meningitis, will d/c antibiotics today and monitor fever curve closely.  3) H/o CVA - On eliquis at home. Continue heparin gtt for now.  4) JOSEFINA 2/2 Rhabdomyolysis - CK 15K -> 13K with Cr 1.4 -> 0.99. Continue aggressive IV fluid hydration with goal -300/hr. Lasix q8h. Monitor electrolytes closely.  5) DVT ppx - heparin gtt  6) GOC - Full code

## 2022-12-04 NOTE — EEG REPORT - NS EEG TEXT BOX
[facility]  Comprehensive Epilepsy Center  Report of Continuous Video EEG    [facility]: [address], [place] [address], Phone [phone]  [place] Office: [address], [address], [place] [address] Phone [phone]    [facility]: [address] [place] [address], Phone [phone]  [place] Office: [address] [place] [address], Phone [phone]    Patient Name: TAMI DUNHAM    Age: 52 year, : 1970  Patient ID: -, MRN #: -, Rtimble: MICU 1 -  Referring Physician: -  EEG #: [id number]    Study Time/Date: 10:11:23 AM on 12/3/2022  	  End Time/Date: 2022 on 0800          			   Duration: 21H 50min    Study Information:    EEG Recording Technique:  The patient underwent continuous Video-EEG monitoring, using Telemetry System hardware on the XLTek Digital System. EEG and video data were stored on a computer hard drive with important events saved in digital archive files. The material was reviewed by a physician (electroencephalographer / epileptologist) on a daily basis. Donnie and seizure detection algorithms were utilized and reviewed. An EEG Technician attended to the patient, and was available throughout daytime work hours.  The epilepsy center neurologist was available in person or on call 24-hours per day.    EEG Placement and Labeling of Electrodes:  The EEG was performed utilizing 20 channel referential EEG connections (coronal over temporal over parasagittal montage) using all standard 10-20 electrode placements with EKG, with additional electrodes placed in the inferior temporal region using the modified 10-10 montage electrode placements for elective admissions, or if deemed necessary. Recording was at a sampling rate of 256 samples per second per channel. Time synchronized digital video recording was done simultaneously with EEG recording. A low light infrared camera was used for low light recording.     History:   VEEG AT BEDSIDE MICU 1  52 YEAR OLD MALE / FEMALE  COR:INTUBATED  P/W:AMS  PMH:VERITGO, HLD, CVA, HISTORY OF TIA'S  HV:NOT COMPLETED DUE TO PANDEMIC  PHOTIC:COMPLETED  A&OX INTUBATED  CONCERN FOR SEIZURE        Pertinent Medication  Diprivan (Propofol)  Sublamize  OFIRMEV  Levophed  ADACEL    Interpretation:    Daily EEG Visual Analysis  Findings: The background was continuous and somewhat reactive.   No posterior dominant rhythm seen.  Background predominantly consisted of theta, delta and faster activities.    Focal Slowing:   None were present.    Sleep Background:  Absence of state change.    Other Non-Epileptiform Findings:  None were present.    Interictal Epileptiform Activity:   None were present.    Events:  Clinical events: None recorded.  Seizures: None recorded.    Activation Procedures:   Hyperventilation was not performed.    Photic stimulation was not performed.     Artifacts:  Intermittent myogenic and movement artifacts were noted.    ECG:  The heart rate on single channel ECG was predominantly between 70-80 BPM.    EEG Summary / Classification:  Abnormal EEG   - Moderate to severe generalized slowing.    EEG Impression / Clinical Correlate:  Abnormal EEG study.  Moderate to severe nonspecific diffuse or multifocal cerebral dysfunction.     ________________________________________  Ronan Duran MD  Attending Physician, [facility] [place]

## 2022-12-04 NOTE — PROGRESS NOTE ADULT - SUBJECTIVE AND OBJECTIVE BOX
INTERVAL HPI/OVERNIGHT EVENTS:    SUBJECTIVE: Patient seen and examined at bedside.     ROS: All negative except as listed above.    VITAL SIGNS:  ICU Vital Signs Last 24 Hrs  T(C): 36.1 (04 Dec 2022 04:00), Max: 37.2 (03 Dec 2022 19:00)  T(F): 97 (04 Dec 2022 04:00), Max: 98.9 (03 Dec 2022 19:00)  HR: 56 (04 Dec 2022 06:06) (56 - 86)  BP: 100/62 (04 Dec 2022 06:00) (84/53 - 154/87)  BP(mean): 73 (04 Dec 2022 06:00) (63 - 101)  ABP: --  ABP(mean): --  RR: 14 (04 Dec 2022 06:00) (10 - 18)  SpO2: 99% (04 Dec 2022 06:06) (98% - 100%)    O2 Parameters below as of 04 Dec 2022 06:00  Patient On (Oxygen Delivery Method): ventilator,CPAP    O2 Concentration (%): 30      Mode: CPAP with PS, FiO2: 30, PEEP: 5, PS: 5, MAP: 7, PIP: 11  Plateau pressure:   P/F ratio:     12-03 @ 07:01  -  12-04 @ 07:00  --------------------------------------------------------  IN: 7569 mL / OUT: 4490 mL / NET: 3079 mL      CAPILLARY BLOOD GLUCOSE      POCT Blood Glucose.: 110 mg/dL (03 Dec 2022 17:21)      ECG: reviewed.    PHYSICAL EXAM:    GENERAL: NAD, lying in bed comfortably  HEAD:  Atraumatic, normocephalic  EYES: EOMI, PERRLA, conjunctiva and sclera clear  NECK: Supple, trachea midline, no JVD  HEART: Regular rate and rhythm, no murmurs, rubs, or gallops  LUNGS: Unlabored respirations.  Clear to auscultation bilaterally, no crackles, wheezing, or rhonchi  ABDOMEN: Soft, nontender, nondistended, +BS  EXTREMITIES: 2+ peripheral pulses bilaterally, cap refill<2 secs. No clubbing, cyanosis, or edema  NERVOUS SYSTEM:  A&Ox3, following commands, moving all extremities, no focal deficits   SKIN: No rashes or lesions    MEDICATIONS:  MEDICATIONS  (STANDING):  acetaminophen   IVPB .. 1000 milliGRAM(s) IV Intermittent once  aspirin enteric coated 81 milliGRAM(s) Oral daily  cefTRIAXone   IVPB 2000 milliGRAM(s) IV Intermittent every 12 hours  chlorhexidine 0.12% Liquid 15 milliLiter(s) Oral Mucosa every 12 hours  chlorhexidine 4% Liquid 1 Application(s) Topical <User Schedule>  dexMEDEtomidine Infusion 0.2 MICROgram(s)/kG/Hr (4.79 mL/Hr) IV Continuous <Continuous>  diphtheria/tetanus/pertussis (acellular) Vaccine (Adacel) 0.5 milliLiter(s) IntraMuscular once  fentaNYL   Infusion. 1.5 MICROgram(s)/kG/Hr (10.5 mL/Hr) IV Continuous <Continuous>  heparin  Infusion. 1300 Unit(s)/Hr (13 mL/Hr) IV Continuous <Continuous>  lactated ringers. 1000 milliLiter(s) (250 mL/Hr) IV Continuous <Continuous>  norepinephrine Infusion 0.05 MICROgram(s)/kG/Min (8.97 mL/Hr) IV Continuous <Continuous>  propofol Infusion 30 MICROgram(s)/kG/Min (12.6 mL/Hr) IV Continuous <Continuous>  vancomycin  IVPB 1000 milliGRAM(s) IV Intermittent every 8 hours    MEDICATIONS  (PRN):  heparin   Injectable 8000 Unit(s) IV Push every 6 hours PRN For aPTT less than 40  heparin   Injectable 4000 Unit(s) IV Push every 6 hours PRN For aPTT between 40 - 57      ALLERGIES:  Allergies    No Known Allergies    Intolerances        LABS:                        11.6   16.38 )-----------( 123      ( 04 Dec 2022 00:15 )             34.7     12-04    138  |  104  |  18  ----------------------------<  123<H>  4.0   |  25  |  0.99    Ca    7.9<L>      04 Dec 2022 00:15  Phos  2.5     12-04  Mg     1.90     12-04    TPro  5.3<L>  /  Alb  2.6<L>  /  TBili  0.5  /  DBili  x   /  AST  218<H>  /  ALT  72<H>  /  AlkPhos  53  12-04    PT/INR - ( 02 Dec 2022 20:00 )   PT: 13.3 sec;   INR: 1.14 ratio         PTT - ( 04 Dec 2022 05:00 )  PTT:75.6 sec  Urinalysis Basic - ( 03 Dec 2022 01:45 )    Color: Yellow / Appearance: Turbid / SG: >1.050 / pH: x  Gluc: x / Ketone: Small  / Bili: Negative / Urobili: <2 mg/dL   Blood: x / Protein: 100 mg/dL / Nitrite: Negative   Leuk Esterase: Negative / RBC: 7 /HPF / WBC 14 /HPF   Sq Epi: x / Non Sq Epi: 4 /HPF / Bacteria: Few      ABG:  pH, Arterial: 7.38 (12-03-22 @ 13:04)  pCO2, Arterial: 41 mmHg (12-03-22 @ 13:04)  pO2, Arterial: 170 mmHg (12-03-22 @ 13:04)      vBG:    Micro:    Culture - Blood (collected 12-02-22 @ 23:00)  Source: .Blood Blood-Peripheral  Preliminary Report (12-04-22 @ 04:01):    No growth to date.    Culture - Blood (collected 12-02-22 @ 22:45)  Source: .Blood Blood-Peripheral  Preliminary Report (12-04-22 @ 04:01):    No growth to date.          RADIOLOGY & ADDITIONAL TESTS: Reviewed.   INTERVAL HPI/OVERNIGHT EVENTS:    SUBJECTIVE: Patient seen and examined at bedside. Patient extubated in AM. Says he fell off bed, but unable to provide more information in AM.     VITAL SIGNS:  ICU Vital Signs Last 24 Hrs  T(C): 36.1 (04 Dec 2022 04:00), Max: 37.2 (03 Dec 2022 19:00)  T(F): 97 (04 Dec 2022 04:00), Max: 98.9 (03 Dec 2022 19:00)  HR: 56 (04 Dec 2022 06:06) (56 - 86)  BP: 100/62 (04 Dec 2022 06:00) (84/53 - 154/87)  BP(mean): 73 (04 Dec 2022 06:00) (63 - 101)  ABP: --  ABP(mean): --  RR: 14 (04 Dec 2022 06:00) (10 - 18)  SpO2: 99% (04 Dec 2022 06:06) (98% - 100%)    O2 Parameters below as of 04 Dec 2022 06:00  Patient On (Oxygen Delivery Method): ventilator,CPAP    O2 Concentration (%): 30      Mode: CPAP with PS, FiO2: 30, PEEP: 5, PS: 5, MAP: 7, PIP: 11  Plateau pressure:   P/F ratio:     12-03 @ 07:01  -  12-04 @ 07:00  --------------------------------------------------------  IN: 7569 mL / OUT: 4490 mL / NET: 3079 mL      CAPILLARY BLOOD GLUCOSE      POCT Blood Glucose.: 110 mg/dL (03 Dec 2022 17:21)      ECG: reviewed.    PHYSICAL EXAM:    GENERAL: NAD, lying in bed comfortably  HEAD:  Atraumatic, normocephalic  EYES: EOMI, PERRLA, conjunctiva and sclera clear  NECK: Supple, trachea midline, no JVD  HEART: Regular rate and rhythm, no mrg  LUNGS: Unlabored respirations.  Clear to auscultation bilaterally, no crackles, wheezing, or rhonchi  ABDOMEN: Soft, nontender, nondistended, +BS  EXTREMITIES: 2+ peripheral pulses bilaterally, cap refill<2 secs. No clubbing, cyanosis, or edema  NERVOUS SYSTEM:  A&Ox2 (person, place), following commands, moving all extremities, some cognitive slowing and expressive aphasia   SKIN: No rashes or lesions    MEDICATIONS:  MEDICATIONS  (STANDING):  acetaminophen   IVPB .. 1000 milliGRAM(s) IV Intermittent once  aspirin enteric coated 81 milliGRAM(s) Oral daily  cefTRIAXone   IVPB 2000 milliGRAM(s) IV Intermittent every 12 hours  chlorhexidine 0.12% Liquid 15 milliLiter(s) Oral Mucosa every 12 hours  chlorhexidine 4% Liquid 1 Application(s) Topical <User Schedule>  dexMEDEtomidine Infusion 0.2 MICROgram(s)/kG/Hr (4.79 mL/Hr) IV Continuous <Continuous>  diphtheria/tetanus/pertussis (acellular) Vaccine (Adacel) 0.5 milliLiter(s) IntraMuscular once  fentaNYL   Infusion. 1.5 MICROgram(s)/kG/Hr (10.5 mL/Hr) IV Continuous <Continuous>  heparin  Infusion. 1300 Unit(s)/Hr (13 mL/Hr) IV Continuous <Continuous>  lactated ringers. 1000 milliLiter(s) (250 mL/Hr) IV Continuous <Continuous>  norepinephrine Infusion 0.05 MICROgram(s)/kG/Min (8.97 mL/Hr) IV Continuous <Continuous>  propofol Infusion 30 MICROgram(s)/kG/Min (12.6 mL/Hr) IV Continuous <Continuous>  vancomycin  IVPB 1000 milliGRAM(s) IV Intermittent every 8 hours    MEDICATIONS  (PRN):  heparin   Injectable 8000 Unit(s) IV Push every 6 hours PRN For aPTT less than 40  heparin   Injectable 4000 Unit(s) IV Push every 6 hours PRN For aPTT between 40 - 57      ALLERGIES:  Allergies    No Known Allergies    Intolerances        LABS:                        11.6   16.38 )-----------( 123      ( 04 Dec 2022 00:15 )             34.7     12-04    138  |  104  |  18  ----------------------------<  123<H>  4.0   |  25  |  0.99    Ca    7.9<L>      04 Dec 2022 00:15  Phos  2.5     12-04  Mg     1.90     12-04    TPro  5.3<L>  /  Alb  2.6<L>  /  TBili  0.5  /  DBili  x   /  AST  218<H>  /  ALT  72<H>  /  AlkPhos  53  12-04    PT/INR - ( 02 Dec 2022 20:00 )   PT: 13.3 sec;   INR: 1.14 ratio         PTT - ( 04 Dec 2022 05:00 )  PTT:75.6 sec  Urinalysis Basic - ( 03 Dec 2022 01:45 )    Color: Yellow / Appearance: Turbid / SG: >1.050 / pH: x  Gluc: x / Ketone: Small  / Bili: Negative / Urobili: <2 mg/dL   Blood: x / Protein: 100 mg/dL / Nitrite: Negative   Leuk Esterase: Negative / RBC: 7 /HPF / WBC 14 /HPF   Sq Epi: x / Non Sq Epi: 4 /HPF / Bacteria: Few      ABG:  pH, Arterial: 7.38 (12-03-22 @ 13:04)  pCO2, Arterial: 41 mmHg (12-03-22 @ 13:04)  pO2, Arterial: 170 mmHg (12-03-22 @ 13:04)      vBG:    Micro:    Culture - Blood (collected 12-02-22 @ 23:00)  Source: .Blood Blood-Peripheral  Preliminary Report (12-04-22 @ 04:01):    No growth to date.    Culture - Blood (collected 12-02-22 @ 22:45)  Source: .Blood Blood-Peripheral  Preliminary Report (12-04-22 @ 04:01):    No growth to date.          RADIOLOGY & ADDITIONAL TESTS: Reviewed.

## 2022-12-04 NOTE — CONSULT NOTE ADULT - ASSESSMENT
52-year-old  M known to me from outpatient setting with  TIAs (2011, 2013), Strokes x3 (02/2022 s/p tPA, 8/3/2022 s/p tPA, 11/5/2022 with residual expressive aphasia) on Eliquis, was on testosterone outpatient stopped after last stroke, HLD BIBEMS after being found unresponsive  Temp 105.4 on arrival tachy and /110. 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   CT cervical spine and maxillofacial scans negative.  CT chest, abdomen, and pelvis w/ contrast concerning for possible partial SBO without transition point. Surgery consulted in ED, no acute surgical intervention at this time.   CT thoracic and lumbar spine showing degenerative disc disease T6-T7 through L4-L5 and mild disc bulges at L2-L3 through L4-L5 that flatten the ventral thecal sac and narrowing of the bilateral neural foramina.  MRI 11/2022: R centrum semiovale and R posterior frontal infarcts   takes adderall at home   + rhabdo  EEG no seizures   + transaminitis     Impression:   1) AMS of unclear etiology, now MILLER and following.  doubt seizure. related to aderrall?   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 had MIMI was question of PFO but not found. s/p ILR     - veeg in progress,   - monitor LFTs   - IVF  - CPK downtrending   - now on precede and fentanyl, wean as tolerated   - on CPAP poslan for possible extubatio. patient may be able to give further collateral information  - would consider endocrine   - there was some concern outpatient he may have a mixed  connective tissue disorder   - MRI brain w/ and w/o pending.   - fever seems to have resolved.  defer LP for now but can consider in future if no improvement   - CTX and vanco  - statin therapy for secondary stroke prevention when LFTS and CPK improve   - on heparin drip    - telemetry  - PT/OT/SS/SLP, OOBC  - check FS, glucose control <180  - GI/DVT ppx  - Counseling on diet, exercise, and medication adherence was done  - Counseling on smoking cessation and alcohol consumption offered when appropriate.  - Pain assessed and judicious use of narcotics when appropriate was discussed.    - Stroke education given when appropriate.  - Importance of fall prevention discussed.   - Differential diagnosis and plan of care discussed with patient and/or family and primary team  - Thank you for allowing me to participate in the care of this patient. Call with questions.   - spoke with ICU team  Braxton Forde MD  Vascular Neurology  Office: [phone]

## 2022-12-05 LAB
ALBUMIN SERPL ELPH-MCNC: 2.2 G/DL — LOW (ref 3.3–5)
ALBUMIN SERPL ELPH-MCNC: 2.7 G/DL — LOW (ref 3.3–5)
ALBUMIN SERPL ELPH-MCNC: 3 G/DL — LOW (ref 3.3–5)
ALP SERPL-CCNC: 49 U/L — SIGNIFICANT CHANGE UP (ref 40–120)
ALP SERPL-CCNC: 60 U/L — SIGNIFICANT CHANGE UP (ref 40–120)
ALP SERPL-CCNC: 72 U/L — SIGNIFICANT CHANGE UP (ref 40–120)
ALT FLD-CCNC: 105 U/L — HIGH (ref 4–41)
ALT FLD-CCNC: 80 U/L — HIGH (ref 4–41)
ALT FLD-CCNC: 92 U/L — HIGH (ref 4–41)
ANION GAP SERPL CALC-SCNC: 10 MMOL/L — SIGNIFICANT CHANGE UP (ref 7–14)
ANION GAP SERPL CALC-SCNC: 10 MMOL/L — SIGNIFICANT CHANGE UP (ref 7–14)
ANION GAP SERPL CALC-SCNC: 14 MMOL/L — SIGNIFICANT CHANGE UP (ref 7–14)
ANION GAP SERPL CALC-SCNC: 21 MMOL/L — HIGH (ref 7–14)
APPEARANCE UR: CLEAR — SIGNIFICANT CHANGE UP
APTT BLD: 32.3 SEC — SIGNIFICANT CHANGE UP (ref 27–36.3)
APTT BLD: 42.5 SEC — HIGH (ref 27–36.3)
APTT BLD: 45.5 SEC — HIGH (ref 27–36.3)
AST SERPL-CCNC: 213 U/L — HIGH (ref 4–40)
AST SERPL-CCNC: 229 U/L — HIGH (ref 4–40)
AST SERPL-CCNC: 273 U/L — HIGH (ref 4–40)
BASE EXCESS BLDV CALC-SCNC: 7.1 MMOL/L — HIGH (ref -2–3)
BILIRUB SERPL-MCNC: 0.5 MG/DL — SIGNIFICANT CHANGE UP (ref 0.2–1.2)
BILIRUB SERPL-MCNC: 0.5 MG/DL — SIGNIFICANT CHANGE UP (ref 0.2–1.2)
BILIRUB SERPL-MCNC: 0.6 MG/DL — SIGNIFICANT CHANGE UP (ref 0.2–1.2)
BILIRUB UR-MCNC: NEGATIVE — SIGNIFICANT CHANGE UP
BLOOD GAS VENOUS COMPREHENSIVE RESULT: SIGNIFICANT CHANGE UP
BLOOD GAS VENOUS COMPREHENSIVE RESULT: SIGNIFICANT CHANGE UP
BUN SERPL-MCNC: 10 MG/DL — SIGNIFICANT CHANGE UP (ref 7–23)
BUN SERPL-MCNC: 11 MG/DL — SIGNIFICANT CHANGE UP (ref 7–23)
BUN SERPL-MCNC: 11 MG/DL — SIGNIFICANT CHANGE UP (ref 7–23)
BUN SERPL-MCNC: 12 MG/DL — SIGNIFICANT CHANGE UP (ref 7–23)
CALCIUM SERPL-MCNC: 6.8 MG/DL — LOW (ref 8.4–10.5)
CALCIUM SERPL-MCNC: 7.7 MG/DL — LOW (ref 8.4–10.5)
CALCIUM SERPL-MCNC: 8 MG/DL — LOW (ref 8.4–10.5)
CALCIUM SERPL-MCNC: 8.6 MG/DL — SIGNIFICANT CHANGE UP (ref 8.4–10.5)
CHLORIDE BLDV-SCNC: 97 MMOL/L — SIGNIFICANT CHANGE UP (ref 96–108)
CHLORIDE SERPL-SCNC: 93 MMOL/L — LOW (ref 98–107)
CHLORIDE SERPL-SCNC: 93 MMOL/L — LOW (ref 98–107)
CHLORIDE SERPL-SCNC: 98 MMOL/L — SIGNIFICANT CHANGE UP (ref 98–107)
CHLORIDE SERPL-SCNC: 99 MMOL/L — SIGNIFICANT CHANGE UP (ref 98–107)
CK SERPL-CCNC: 5106 U/L — HIGH (ref 30–200)
CO2 BLDV-SCNC: 32.6 MMOL/L — HIGH (ref 22–26)
CO2 SERPL-SCNC: 23 MMOL/L — SIGNIFICANT CHANGE UP (ref 22–31)
CO2 SERPL-SCNC: 26 MMOL/L — SIGNIFICANT CHANGE UP (ref 22–31)
CO2 SERPL-SCNC: 27 MMOL/L — SIGNIFICANT CHANGE UP (ref 22–31)
CO2 SERPL-SCNC: 28 MMOL/L — SIGNIFICANT CHANGE UP (ref 22–31)
COLOR SPEC: YELLOW — SIGNIFICANT CHANGE UP
CREAT SERPL-MCNC: 0.71 MG/DL — SIGNIFICANT CHANGE UP (ref 0.5–1.3)
CREAT SERPL-MCNC: 0.81 MG/DL — SIGNIFICANT CHANGE UP (ref 0.5–1.3)
CREAT SERPL-MCNC: 0.86 MG/DL — SIGNIFICANT CHANGE UP (ref 0.5–1.3)
CREAT SERPL-MCNC: 0.9 MG/DL — SIGNIFICANT CHANGE UP (ref 0.5–1.3)
DIFF PNL FLD: NEGATIVE — SIGNIFICANT CHANGE UP
EGFR: 103 ML/MIN/1.73M2 — SIGNIFICANT CHANGE UP
EGFR: 104 ML/MIN/1.73M2 — SIGNIFICANT CHANGE UP
EGFR: 106 ML/MIN/1.73M2 — SIGNIFICANT CHANGE UP
EGFR: 110 ML/MIN/1.73M2 — SIGNIFICANT CHANGE UP
GAS PNL BLDV: 136 MMOL/L — SIGNIFICANT CHANGE UP (ref 136–145)
GLUCOSE BLDC GLUCOMTR-MCNC: 120 MG/DL — HIGH (ref 70–99)
GLUCOSE BLDV-MCNC: 99 MG/DL — SIGNIFICANT CHANGE UP (ref 70–99)
GLUCOSE SERPL-MCNC: 104 MG/DL — HIGH (ref 70–99)
GLUCOSE SERPL-MCNC: 118 MG/DL — HIGH (ref 70–99)
GLUCOSE SERPL-MCNC: 90 MG/DL — SIGNIFICANT CHANGE UP (ref 70–99)
GLUCOSE SERPL-MCNC: 96 MG/DL — SIGNIFICANT CHANGE UP (ref 70–99)
GLUCOSE UR QL: NEGATIVE — SIGNIFICANT CHANGE UP
HCO3 BLDV-SCNC: 31 MMOL/L — HIGH (ref 22–29)
HCT VFR BLD CALC: 29.6 % — LOW (ref 39–50)
HCT VFR BLD CALC: 32.3 % — LOW (ref 39–50)
HCT VFR BLD CALC: 37.6 % — LOW (ref 39–50)
HCT VFR BLDA CALC: 34 % — LOW (ref 39–51)
HGB BLD CALC-MCNC: 11.3 G/DL — LOW (ref 13–17)
HGB BLD-MCNC: 10.7 G/DL — LOW (ref 13–17)
HGB BLD-MCNC: 12.1 G/DL — LOW (ref 13–17)
HGB BLD-MCNC: 9.5 G/DL — LOW (ref 13–17)
KETONES UR-MCNC: ABNORMAL
LACTATE BLDV-MCNC: 2.1 MMOL/L — HIGH (ref 0.5–2)
LEUKOCYTE ESTERASE UR-ACNC: NEGATIVE — SIGNIFICANT CHANGE UP
MAGNESIUM SERPL-MCNC: 1.7 MG/DL — SIGNIFICANT CHANGE UP (ref 1.6–2.6)
MAGNESIUM SERPL-MCNC: 1.8 MG/DL — SIGNIFICANT CHANGE UP (ref 1.6–2.6)
MCHC RBC-ENTMCNC: 28.8 PG — SIGNIFICANT CHANGE UP (ref 27–34)
MCHC RBC-ENTMCNC: 29.1 PG — SIGNIFICANT CHANGE UP (ref 27–34)
MCHC RBC-ENTMCNC: 29.5 PG — SIGNIFICANT CHANGE UP (ref 27–34)
MCHC RBC-ENTMCNC: 32.1 GM/DL — SIGNIFICANT CHANGE UP (ref 32–36)
MCHC RBC-ENTMCNC: 32.2 GM/DL — SIGNIFICANT CHANGE UP (ref 32–36)
MCHC RBC-ENTMCNC: 33.1 GM/DL — SIGNIFICANT CHANGE UP (ref 32–36)
MCV RBC AUTO: 86.8 FL — SIGNIFICANT CHANGE UP (ref 80–100)
MCV RBC AUTO: 90.8 FL — SIGNIFICANT CHANGE UP (ref 80–100)
MCV RBC AUTO: 91.7 FL — SIGNIFICANT CHANGE UP (ref 80–100)
NITRITE UR-MCNC: NEGATIVE — SIGNIFICANT CHANGE UP
NRBC # BLD: 0 /100 WBCS — SIGNIFICANT CHANGE UP (ref 0–0)
NRBC # FLD: 0 K/UL — SIGNIFICANT CHANGE UP (ref 0–0)
PCO2 BLDV: 42 MMHG — SIGNIFICANT CHANGE UP (ref 42–55)
PH BLDV: 7.48 — HIGH (ref 7.32–7.43)
PH UR: 7.5 — SIGNIFICANT CHANGE UP (ref 5–8)
PHOSPHATE SERPL-MCNC: 3.1 MG/DL — SIGNIFICANT CHANGE UP (ref 2.5–4.5)
PHOSPHATE SERPL-MCNC: 3.3 MG/DL — SIGNIFICANT CHANGE UP (ref 2.5–4.5)
PLATELET # BLD AUTO: 103 K/UL — LOW (ref 150–400)
PLATELET # BLD AUTO: 135 K/UL — LOW (ref 150–400)
PLATELET # BLD AUTO: 138 K/UL — LOW (ref 150–400)
PO2 BLDV: 41 MMHG — SIGNIFICANT CHANGE UP
POTASSIUM BLDV-SCNC: 4 MMOL/L — SIGNIFICANT CHANGE UP (ref 3.5–5.1)
POTASSIUM SERPL-MCNC: 2.7 MMOL/L — CRITICAL LOW (ref 3.5–5.3)
POTASSIUM SERPL-MCNC: 3.3 MMOL/L — LOW (ref 3.5–5.3)
POTASSIUM SERPL-MCNC: 3.5 MMOL/L — SIGNIFICANT CHANGE UP (ref 3.5–5.3)
POTASSIUM SERPL-MCNC: 4 MMOL/L — SIGNIFICANT CHANGE UP (ref 3.5–5.3)
POTASSIUM SERPL-SCNC: 2.7 MMOL/L — CRITICAL LOW (ref 3.5–5.3)
POTASSIUM SERPL-SCNC: 3.3 MMOL/L — LOW (ref 3.5–5.3)
POTASSIUM SERPL-SCNC: 3.5 MMOL/L — SIGNIFICANT CHANGE UP (ref 3.5–5.3)
POTASSIUM SERPL-SCNC: 4 MMOL/L — SIGNIFICANT CHANGE UP (ref 3.5–5.3)
PROLACTIN SERPL-MCNC: 74.1 NG/ML — HIGH (ref 4.1–18.4)
PROT SERPL-MCNC: 4.9 G/DL — LOW (ref 6–8.3)
PROT SERPL-MCNC: 5.8 G/DL — LOW (ref 6–8.3)
PROT SERPL-MCNC: 6.5 G/DL — SIGNIFICANT CHANGE UP (ref 6–8.3)
PROT UR-MCNC: ABNORMAL
RBC # BLD: 3.26 M/UL — LOW (ref 4.2–5.8)
RBC # BLD: 3.72 M/UL — LOW (ref 4.2–5.8)
RBC # BLD: 4.1 M/UL — LOW (ref 4.2–5.8)
RBC # FLD: 13.1 % — SIGNIFICANT CHANGE UP (ref 10.3–14.5)
RBC # FLD: 13.2 % — SIGNIFICANT CHANGE UP (ref 10.3–14.5)
RBC # FLD: 13.3 % — SIGNIFICANT CHANGE UP (ref 10.3–14.5)
RBC CASTS # UR COMP ASSIST: 0 /HPF — SIGNIFICANT CHANGE UP (ref 0–4)
SAO2 % BLDV: 73 % — SIGNIFICANT CHANGE UP
SODIUM SERPL-SCNC: 131 MMOL/L — LOW (ref 135–145)
SODIUM SERPL-SCNC: 136 MMOL/L — SIGNIFICANT CHANGE UP (ref 135–145)
SODIUM SERPL-SCNC: 137 MMOL/L — SIGNIFICANT CHANGE UP (ref 135–145)
SODIUM SERPL-SCNC: 138 MMOL/L — SIGNIFICANT CHANGE UP (ref 135–145)
SP GR SPEC: 1.05 — SIGNIFICANT CHANGE UP (ref 1.01–1.05)
TSH SERPL-MCNC: 1.3 UIU/ML — SIGNIFICANT CHANGE UP (ref 0.27–4.2)
UROBILINOGEN FLD QL: SIGNIFICANT CHANGE UP
VANCOMYCIN TROUGH SERPL-MCNC: 12.3 UG/ML — SIGNIFICANT CHANGE UP (ref 10–20)
WBC # BLD: 12.26 K/UL — HIGH (ref 3.8–10.5)
WBC # BLD: 13.25 K/UL — HIGH (ref 3.8–10.5)
WBC # BLD: 14.79 K/UL — HIGH (ref 3.8–10.5)
WBC # FLD AUTO: 12.26 K/UL — HIGH (ref 3.8–10.5)
WBC # FLD AUTO: 13.25 K/UL — HIGH (ref 3.8–10.5)
WBC # FLD AUTO: 14.79 K/UL — HIGH (ref 3.8–10.5)
WBC UR QL: 1 /HPF — SIGNIFICANT CHANGE UP (ref 0–5)

## 2022-12-05 PROCEDURE — 99231 SBSQ HOSP IP/OBS SF/LOW 25: CPT | Mod: GC

## 2022-12-05 PROCEDURE — 93312 ECHO TRANSESOPHAGEAL: CPT | Mod: 26,GC

## 2022-12-05 PROCEDURE — 99291 CRITICAL CARE FIRST HOUR: CPT | Mod: GC,25

## 2022-12-05 PROCEDURE — 71045 X-RAY EXAM CHEST 1 VIEW: CPT | Mod: 26,76

## 2022-12-05 PROCEDURE — 95720 EEG PHY/QHP EA INCR W/VEEG: CPT

## 2022-12-05 PROCEDURE — 99291 CRITICAL CARE FIRST HOUR: CPT | Mod: GC

## 2022-12-05 PROCEDURE — 70450 CT HEAD/BRAIN W/O DYE: CPT | Mod: 26

## 2022-12-05 RX ORDER — PROPOFOL 10 MG/ML
20 INJECTION, EMULSION INTRAVENOUS
Qty: 1000 | Refills: 0 | Status: DISCONTINUED | OUTPATIENT
Start: 2022-12-05 | End: 2022-12-06

## 2022-12-05 RX ORDER — POTASSIUM CHLORIDE 20 MEQ
10 PACKET (EA) ORAL
Refills: 0 | Status: COMPLETED | OUTPATIENT
Start: 2022-12-05 | End: 2022-12-05

## 2022-12-05 RX ORDER — MIDAZOLAM HYDROCHLORIDE 1 MG/ML
4 INJECTION, SOLUTION INTRAMUSCULAR; INTRAVENOUS ONCE
Refills: 0 | Status: DISCONTINUED | OUTPATIENT
Start: 2022-12-05 | End: 2022-12-05

## 2022-12-05 RX ORDER — LEVETIRACETAM 250 MG/1
1500 TABLET, FILM COATED ORAL ONCE
Refills: 0 | Status: COMPLETED | OUTPATIENT
Start: 2022-12-05 | End: 2022-12-05

## 2022-12-05 RX ORDER — MAGNESIUM SULFATE 500 MG/ML
2 VIAL (ML) INJECTION ONCE
Refills: 0 | Status: COMPLETED | OUTPATIENT
Start: 2022-12-05 | End: 2022-12-05

## 2022-12-05 RX ORDER — POTASSIUM CHLORIDE 20 MEQ
40 PACKET (EA) ORAL ONCE
Refills: 0 | Status: COMPLETED | OUTPATIENT
Start: 2022-12-05 | End: 2022-12-06

## 2022-12-05 RX ORDER — ACETAMINOPHEN 500 MG
955 TABLET ORAL EVERY 6 HOURS
Refills: 0 | Status: DISCONTINUED | OUTPATIENT
Start: 2022-12-05 | End: 2022-12-05

## 2022-12-05 RX ORDER — POTASSIUM CHLORIDE 20 MEQ
40 PACKET (EA) ORAL ONCE
Refills: 0 | Status: COMPLETED | OUTPATIENT
Start: 2022-12-05 | End: 2022-12-05

## 2022-12-05 RX ORDER — LEVETIRACETAM 250 MG/1
4500 TABLET, FILM COATED ORAL ONCE
Refills: 0 | Status: DISCONTINUED | OUTPATIENT
Start: 2022-12-05 | End: 2022-12-05

## 2022-12-05 RX ORDER — CHLORHEXIDINE GLUCONATE 213 G/1000ML
15 SOLUTION TOPICAL EVERY 12 HOURS
Refills: 0 | Status: DISCONTINUED | OUTPATIENT
Start: 2022-12-05 | End: 2022-12-08

## 2022-12-05 RX ORDER — CISATRACURIUM BESYLATE 2 MG/ML
20 INJECTION INTRAVENOUS ONCE
Refills: 0 | Status: COMPLETED | OUTPATIENT
Start: 2022-12-05 | End: 2022-12-05

## 2022-12-05 RX ORDER — FUROSEMIDE 40 MG
40 TABLET ORAL ONCE
Refills: 0 | Status: COMPLETED | OUTPATIENT
Start: 2022-12-05 | End: 2022-12-05

## 2022-12-05 RX ORDER — ACETAMINOPHEN 500 MG
1000 TABLET ORAL ONCE
Refills: 0 | Status: COMPLETED | OUTPATIENT
Start: 2022-12-05 | End: 2022-12-05

## 2022-12-05 RX ORDER — LEVETIRACETAM 250 MG/1
1500 TABLET, FILM COATED ORAL
Refills: 0 | Status: DISCONTINUED | OUTPATIENT
Start: 2022-12-05 | End: 2022-12-05

## 2022-12-05 RX ORDER — CHLORHEXIDINE GLUCONATE 213 G/1000ML
1 SOLUTION TOPICAL DAILY
Refills: 0 | Status: DISCONTINUED | OUTPATIENT
Start: 2022-12-05 | End: 2023-01-11

## 2022-12-05 RX ORDER — HEPARIN SODIUM 5000 [USP'U]/ML
1300 INJECTION INTRAVENOUS; SUBCUTANEOUS
Qty: 25000 | Refills: 0 | Status: DISCONTINUED | OUTPATIENT
Start: 2022-12-05 | End: 2022-12-12

## 2022-12-05 RX ORDER — LEVETIRACETAM 250 MG/1
1500 TABLET, FILM COATED ORAL EVERY 12 HOURS
Refills: 0 | Status: DISCONTINUED | OUTPATIENT
Start: 2022-12-05 | End: 2022-12-08

## 2022-12-05 RX ORDER — CEFTRIAXONE 500 MG/1
2000 INJECTION, POWDER, FOR SOLUTION INTRAMUSCULAR; INTRAVENOUS EVERY 12 HOURS
Refills: 0 | Status: DISCONTINUED | OUTPATIENT
Start: 2022-12-05 | End: 2022-12-06

## 2022-12-05 RX ORDER — CEFTRIAXONE 500 MG/1
1000 INJECTION, POWDER, FOR SOLUTION INTRAMUSCULAR; INTRAVENOUS EVERY 24 HOURS
Refills: 0 | Status: DISCONTINUED | OUTPATIENT
Start: 2022-12-05 | End: 2022-12-05

## 2022-12-05 RX ORDER — HEPARIN SODIUM 5000 [USP'U]/ML
4000 INJECTION INTRAVENOUS; SUBCUTANEOUS EVERY 6 HOURS
Refills: 0 | Status: DISCONTINUED | OUTPATIENT
Start: 2022-12-05 | End: 2022-12-13

## 2022-12-05 RX ORDER — VANCOMYCIN HCL 1 G
1000 VIAL (EA) INTRAVENOUS EVERY 8 HOURS
Refills: 0 | Status: DISCONTINUED | OUTPATIENT
Start: 2022-12-05 | End: 2022-12-06

## 2022-12-05 RX ORDER — FENTANYL CITRATE 50 UG/ML
100 INJECTION INTRAVENOUS ONCE
Refills: 0 | Status: DISCONTINUED | OUTPATIENT
Start: 2022-12-05 | End: 2022-12-05

## 2022-12-05 RX ORDER — HEPARIN SODIUM 5000 [USP'U]/ML
8000 INJECTION INTRAVENOUS; SUBCUTANEOUS EVERY 6 HOURS
Refills: 0 | Status: DISCONTINUED | OUTPATIENT
Start: 2022-12-05 | End: 2022-12-13

## 2022-12-05 RX ADMIN — SODIUM CHLORIDE 250 MILLILITER(S): 9 INJECTION, SOLUTION INTRAVENOUS at 09:51

## 2022-12-05 RX ADMIN — Medication 1000 MILLIGRAM(S): at 13:13

## 2022-12-05 RX ADMIN — PROPOFOL 11.5 MICROGRAM(S)/KG/MIN: 10 INJECTION, EMULSION INTRAVENOUS at 19:31

## 2022-12-05 RX ADMIN — Medication 400 MILLIGRAM(S): at 12:11

## 2022-12-05 RX ADMIN — LEVETIRACETAM 400 MILLIGRAM(S): 250 TABLET, FILM COATED ORAL at 05:36

## 2022-12-05 RX ADMIN — SODIUM CHLORIDE 150 MILLILITER(S): 9 INJECTION, SOLUTION INTRAVENOUS at 22:28

## 2022-12-05 RX ADMIN — CHLORHEXIDINE GLUCONATE 15 MILLILITER(S): 213 SOLUTION TOPICAL at 05:41

## 2022-12-05 RX ADMIN — HEPARIN SODIUM 1300 UNIT(S)/HR: 5000 INJECTION INTRAVENOUS; SUBCUTANEOUS at 05:25

## 2022-12-05 RX ADMIN — SODIUM CHLORIDE 250 MILLILITER(S): 9 INJECTION, SOLUTION INTRAVENOUS at 19:32

## 2022-12-05 RX ADMIN — FENTANYL CITRATE 100 MICROGRAM(S): 50 INJECTION INTRAVENOUS at 15:15

## 2022-12-05 RX ADMIN — Medication 100 MILLIEQUIVALENT(S): at 06:51

## 2022-12-05 RX ADMIN — LEVETIRACETAM 400 MILLIGRAM(S): 250 TABLET, FILM COATED ORAL at 05:22

## 2022-12-05 RX ADMIN — LEVETIRACETAM 400 MILLIGRAM(S): 250 TABLET, FILM COATED ORAL at 13:20

## 2022-12-05 RX ADMIN — Medication 81 MILLIGRAM(S): at 12:11

## 2022-12-05 RX ADMIN — HEPARIN SODIUM 1300 UNIT(S)/HR: 5000 INJECTION INTRAVENOUS; SUBCUTANEOUS at 07:43

## 2022-12-05 RX ADMIN — Medication 100 MILLIEQUIVALENT(S): at 10:29

## 2022-12-05 RX ADMIN — HEPARIN SODIUM 1500 UNIT(S)/HR: 5000 INJECTION INTRAVENOUS; SUBCUTANEOUS at 12:13

## 2022-12-05 RX ADMIN — Medication 100 MILLIEQUIVALENT(S): at 12:00

## 2022-12-05 RX ADMIN — CHLORHEXIDINE GLUCONATE 15 MILLILITER(S): 213 SOLUTION TOPICAL at 17:41

## 2022-12-05 RX ADMIN — Medication 25 GRAM(S): at 04:27

## 2022-12-05 RX ADMIN — Medication 100 MILLIEQUIVALENT(S): at 12:07

## 2022-12-05 RX ADMIN — Medication 40 MILLIEQUIVALENT(S): at 22:28

## 2022-12-05 RX ADMIN — CHLORHEXIDINE GLUCONATE 1 APPLICATION(S): 213 SOLUTION TOPICAL at 17:43

## 2022-12-05 RX ADMIN — Medication 2 MILLIGRAM(S): at 05:26

## 2022-12-05 RX ADMIN — Medication 400 MILLIGRAM(S): at 17:40

## 2022-12-05 RX ADMIN — Medication 250 MILLIGRAM(S): at 21:22

## 2022-12-05 RX ADMIN — Medication 250 MILLIGRAM(S): at 13:20

## 2022-12-05 RX ADMIN — LEVETIRACETAM 400 MILLIGRAM(S): 250 TABLET, FILM COATED ORAL at 23:10

## 2022-12-05 RX ADMIN — MIDAZOLAM HYDROCHLORIDE 4 MILLIGRAM(S): 1 INJECTION, SOLUTION INTRAMUSCULAR; INTRAVENOUS at 15:05

## 2022-12-05 RX ADMIN — Medication 100 MILLIEQUIVALENT(S): at 05:36

## 2022-12-05 RX ADMIN — Medication 1000 MILLIGRAM(S): at 18:30

## 2022-12-05 RX ADMIN — Medication 40 MILLIGRAM(S): at 21:23

## 2022-12-05 RX ADMIN — Medication 100 MILLIEQUIVALENT(S): at 07:43

## 2022-12-05 RX ADMIN — PROPOFOL 11.5 MICROGRAM(S)/KG/MIN: 10 INJECTION, EMULSION INTRAVENOUS at 07:43

## 2022-12-05 RX ADMIN — CEFTRIAXONE 100 MILLIGRAM(S): 500 INJECTION, POWDER, FOR SOLUTION INTRAMUSCULAR; INTRAVENOUS at 13:21

## 2022-12-05 RX ADMIN — FENTANYL CITRATE 100 MICROGRAM(S): 50 INJECTION INTRAVENOUS at 15:00

## 2022-12-05 RX ADMIN — PROPOFOL 11.5 MICROGRAM(S)/KG/MIN: 10 INJECTION, EMULSION INTRAVENOUS at 05:25

## 2022-12-05 RX ADMIN — CISATRACURIUM BESYLATE 20 MILLIGRAM(S): 2 INJECTION INTRAVENOUS at 15:05

## 2022-12-05 NOTE — PROGRESS NOTE ADULT - ASSESSMENT
52-year-old  M known to me from outpatient setting with  TIAs (2011, 2013), Strokes x3 (02/2022 s/p tPA, 8/3/2022 s/p tPA, 11/5/2022 with residual expressive aphasia) on Eliquis, was on testosterone outpatient stopped after last stroke, HLD BIBEMS after being found unresponsive  Temp 105.4 on arrival tachy and /110. 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   CT cervical spine and maxillofacial scans negative.  CT chest, abdomen, and pelvis w/ contrast concerning for possible partial SBO without transition point. Surgery consulted in ED, no acute surgical intervention at this time.   CT thoracic and lumbar spine showing degenerative disc disease T6-T7 through L4-L5 and mild disc bulges at L2-L3 through L4-L5 that flatten the ventral thecal sac and narrowing of the bilateral neural foramina.  MRI 11/2022: R centrum semiovale and R posterior frontal infarcts   takes adderall at home   + rhabdo  EEG no seizures   + transaminitis   CTH 12/5 stable   12/5 extubated then reintibuated for seizure activity and tx back to ICU   EEG this AM 12/5 with only slowing   Impression:   1) AMS of unclear etiology, possible now seizure, related to adderral withdrawal?   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 had MIMI was question of PFO but not found. s/p ILR     - loaded with keppra overnight.  and started 1500mg TID.  please lower to 1500mg BID   - MRI brain seizure protocol   - veeg in progress  - monitor LFTs   - IVF  - CPK elevated. trend   - now on propofol   - would consider endocrine   - there was some concern outpatient he may have a mixed  connective tissue disorder   - fever seems to have resolved.  defer LP for now but can consider in future if no improvement   - CTX and vanco  - statin therapy for secondary stroke prevention when LFTS and CPK improve   - on heparin drip    - telemetry  - PT/OT/SS/SLP, OOBC  - check FS, glucose control <180  - GI/DVT ppx  - Thank you for allowing me to participate in the care of this patient. Call with questions.   - spoke with ICU team  Braxton Forde MD  Vascular Neurology  Office: [phone]    52-year-old  M known to me from outpatient setting with  TIAs (2011, 2013), Strokes x3 (02/2022 s/p tPA, 8/3/2022 s/p tPA, 11/5/2022 with residual expressive aphasia) on Eliquis, was on testosterone outpatient stopped after last stroke, HLD BIBEMS after being found unresponsive  Temp 105.4 on arrival tachy and /110. 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   CT cervical spine and maxillofacial scans negative.  CT chest, abdomen, and pelvis w/ contrast concerning for possible partial SBO without transition point. Surgery consulted in ED, no acute surgical intervention at this time.   CT thoracic and lumbar spine showing degenerative disc disease T6-T7 through L4-L5 and mild disc bulges at L2-L3 through L4-L5 that flatten the ventral thecal sac and narrowing of the bilateral neural foramina.  MRI 11/2022: R centrum semiovale and R posterior frontal infarcts   takes adderall at home   + rhabdo  EEG no seizures   + transaminitis   CTH 12/5 stable   outpatient hypercoag workup neg   12/5 extubated then reintibuated for seizure activity and tx back to ICU   EEG this AM 12/5 with only slowing   Impression:   1) AMS of unclear etiology, possible now seizure, related to adderral withdrawal?   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 had MIMI was question of PFO but not found. s/p ILR     - loaded with keppra overnight.  and started 1500mg TID.  please lower to 1500mg BID   - MRI brain seizure protocol   - veeg in progress  - monitor LFTs   - IVF  - CPK elevated. trend   - now on propofol   - would consider endocrine   - there was some concern outpatient he may have a mixed  connective tissue disorder   - fever seems to have resolved.  defer LP for now but can consider in future if no improvement   - CTX and vanco  - statin therapy for secondary stroke prevention when LFTS and CPK improve   - on heparin drip    - telemetry  - PT/OT/SS/SLP, OOBC  - check FS, glucose control <180  - GI/DVT ppx  - Thank you for allowing me to participate in the care of this patient. Call with questions.   - spoke with ICU team  Braxton Forde MD  Vascular Neurology  Office: [phone]

## 2022-12-05 NOTE — PROGRESS NOTE ADULT - SUBJECTIVE AND OBJECTIVE BOX
Neurology Progress Note    S: Patient seen and examined intubated on sedation in ICU     Medication:  acetaminophen   IVPB .. 1000 milliGRAM(s) IV Intermittent once  aluminum hydroxide/magnesium hydroxide/simethicone Suspension 30 milliLiter(s) Oral every 4 hours PRN  aspirin enteric coated 81 milliGRAM(s) Oral daily  chlorhexidine 0.12% Liquid 15 milliLiter(s) Oral Mucosa every 12 hours  chlorhexidine 4% Liquid 1 Application(s) Topical <User Schedule>  diphtheria/tetanus/pertussis (acellular) Vaccine (Adacel) 0.5 milliLiter(s) IntraMuscular once  heparin   Injectable 8000 Unit(s) IV Push every 6 hours PRN  heparin   Injectable 4000 Unit(s) IV Push every 6 hours PRN  heparin  Infusion. 1300 Unit(s)/Hr IV Continuous <Continuous>  lactated ringers. 1000 milliLiter(s) IV Continuous <Continuous>  levETIRAcetam  IVPB 1500 milliGRAM(s) IV Intermittent <User Schedule>  melatonin 3 milliGRAM(s) Oral at bedtime PRN  ondansetron Injectable 4 milliGRAM(s) IV Push every 8 hours PRN  potassium chloride  10 mEq/100 mL IVPB 10 milliEquivalent(s) IV Intermittent every 1 hour  propofol Infusion 20 MICROgram(s)/kG/Min IV Continuous <Continuous>      Vitals:  Vital Signs Last 24 Hrs  T(C): 37.9 (05 Dec 2022 08:00), Max: 37.9 (05 Dec 2022 08:00)  T(F): 100.3 (05 Dec 2022 08:00), Max: 100.3 (05 Dec 2022 08:00)  HR: 96 (05 Dec 2022 10:35) (54 - 98)  BP: 126/80 (05 Dec 2022 10:00) (96/61 - 145/87)  BP(mean): 95 (05 Dec 2022 10:00) (72 - 117)  RR: 16 (05 Dec 2022 10:00) (11 - 21)  SpO2: 100% (05 Dec 2022 10:35) (96% - 100%)    Parameters below as of 05 Dec 2022 09:00  Patient On (Oxygen Delivery Method): ventilator    O2 Concentration (%): 40    General Exam:   General Appearance: Appropriately dressed and in no acute distress       Head: Normocephalic, atraumatic and no dysmorphic features  Ear, Nose, and Throat: Moist mucous membranes +ETT   CVS: S1S2+  Resp: No SOB, no wheeze or rhonchi  Abd: soft NTND  Extremities: No edema, no cyanosis  Skin: No bruises, no rashes     Neurological Exam: (propofol)   Mental Status: intubated, sedated, no verbal, not follwoing today   Cranial Nerves: PERRL, EOMI, VFFC, sensation V1-V3 intact,  no obvious facial asymmetry  + corneals, + gag    Motor: no spontaneous   Sensation:minimal withdrawal to noxious x 4    Reflexes: 1+ throughout at biceps, brachioradialis, triceps, patellars and ankles bilaterally and equal. No clonus. R toe and L toe were both downgoing.  Coordination: unable   Gait: unabl;e     I personally reviewed the below data/images/labs:      CBC Full  -  ( 05 Dec 2022 10:45 )  WBC Count : 12.26 K/uL  RBC Count : 3.72 M/uL  Hemoglobin : 10.7 g/dL  Hematocrit : 32.3 %  Platelet Count - Automated : 135 K/uL  Mean Cell Volume : 86.8 fL  Mean Cell Hemoglobin : 28.8 pg  Mean Cell Hemoglobin Concentration : 33.1 gm/dL  Auto Neutrophil # : x  Auto Lymphocyte # : x  Auto Monocyte # : x  Auto Eosinophil # : x  Auto Basophil # : x  Auto Neutrophil % : x  Auto Lymphocyte % : x  Auto Monocyte % : x  Auto Eosinophil % : x  Auto Basophil % : x    12-05    131<L>  |  93<L>  |  10  ----------------------------<  90  2.7<LL>   |  28  |  0.71    Ca    6.8<L>      05 Dec 2022 06:30  Phos  3.3     12-05  Mg     1.70     12-05    TPro  4.9<L>  /  Alb  2.2<L>  /  TBili  0.5  /  DBili  x   /  AST  213<H>  /  ALT  80<H>  /  AlkPhos  49  12-05    LIVER FUNCTIONS - ( 05 Dec 2022 06:30 )  Alb: 2.2 g/dL / Pro: 4.9 g/dL / ALK PHOS: 49 U/L / ALT: 80 U/L / AST: 213 U/L / GGT: x           PTT - ( 05 Dec 2022 06:30 )  PTT:32.3 sec      < from: CT Head No Cont (12.02.22 @ 20:27) >    ACC: [id number] EXAM:  CT CERVICAL SPINE                        ACC: [id number] EXAM:  CT MAXILLOFACIAL                        ACC: [id number] EXAM:  CT BRAIN                          PROCEDURE DATE:  12/02/2022          INTERPRETATION:  CLINICAL INDICATION: Trauma.    Technique: Noncontrast axial CT of the head, facial bones, and cervical   spine was performed. 3-D reformats of the facial bones was obtained.   Coronal and sagittal reformats were obtained.      COMPARISON: None.    FINDINGS:  Head CT:  The ventricles and sulci are within normal limits. 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 There is no intraparenchymal hematoma, mass effect   or midline shift. No abnormal extra-axial fluid collections or   hemorrhages are present.    There is swelling of the left lateral scalp. The calvarium is intact.   There are scattered mucosal inflammatory changes in the paranasal sinuses.      Cervical spine CT:  Alignment ismaintained. Vertebral bodies are normal in height, without   evidence of fracture or dislocation. Prevertebral soft tissues are within   normal limits without soft tissue swelling or hematoma.    Intervertebral discs are intact. Neural foramina and spinal canal are   intact.    The visualized lung apices are within normal limits.      Facial bone CT:    No acute facial fracture.    There are scattered mucosal inflammatory changes in the paranasal   sinuses. Mastoid air cells are clear.    Soft tissues appear unremarkable.        IMPRESSION:  CT HEAD: No acute abnormality.  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  CT CERVICAL SPINE: No acute abnormality  CT FACIAL BONE: No acute abnormality    --- End of Report ---            DELGADO IVAN MD; Attending Radiologist  This document has been electronically signed. Dec  2 2022  9:02PM    < end of copied text >  < from: CT Lumbar Spine No Cont (12.02.22 @ 20:27) >    ACC: [id number] EXAM:  CT LUMBAR SPINE                        ACC: [id number] EXAM:  CT THORACIC SPINE                          PROCEDURE DATE:  12/02/2022          INTERPRETATION:  CT thoracic and lumbar spine without IV contrast    CLINICAL INFORMATION:  Trauma  Back pain, fracture.    TECHNIQUE:  Contiguous axial 2 mm sections were obtained through the   thoracic and lumbar spine using a single helical acquisition.     Additional 2 mm sagittal and coronal reconstructions of the spine were   obtained. These additional reformatted images were used to evaluate the   spine for alignment, vertebral fractures and the integrity of the the   posterior elements.   This scan was performed using automatic exposure   control (radiation dose reduction software) to obtain a diagnostic image   quality scan with patient dose as low as reasonably achievable.    FINDINGS:   No prior similar studies are available for review    Thoracic and lumbar vertebral body heights are maintained. No vertebral   fracture is seen. No destructive bone lesion is found.  Alignment is   preserved.  Facet joints appear intact and aligned.    Thoracic and lumbar intervertebral disc spaces appear intact.   Degenerative disc disease and spondylosis is noted at T6-T7 throughL4-5   with loss of disc height and associated degenerative endplate changes.   Mild disc bulges at L2-3 through L4-5 flatten the ventral thecal sac and   narrow the BILATERAL neural foramina.    No paraspinal mass is recognized.  Paraspinal soft tissues appear intact.      IMPRESSION:  Degenerative disc disease and spondylosis is noted at T6-T7   through L4-5 with loss of disc height and associated degenerative   endplate changes. Mild disc bulges at L2-3 through L4-5 flatten the   ventral thecal sac and narrow the BILATERAL neural foramina   No   vertebral fracture is recognized.    --- End of Report ---            ANGIE ESCOBAR MD; Attending Radiologist  This document has been electronically signed. Dec  2 2022  8:55PM    < end of copied text >

## 2022-12-05 NOTE — PROGRESS NOTE ADULT - SUBJECTIVE AND OBJECTIVE BOX
Name of Patient : TAMI DUNHAM  MRN: [id number]  Date of visit: 22       Subjective: Patient seen and examined. No new events except as noted.   transferred back to ICU due to acute AMS  intubated and sedated     REVIEW OF SYSTEMS:  limited     MEDICATIONS:  MEDICATIONS  (STANDING):  aspirin enteric coated 81 milliGRAM(s) Oral daily  cefTRIAXone   IVPB 2000 milliGRAM(s) IV Intermittent every 12 hours  chlorhexidine 0.12% Liquid 15 milliLiter(s) Oral Mucosa every 12 hours  chlorhexidine 2% Cloths 1 Application(s) Topical daily  chlorhexidine 4% Liquid 1 Application(s) Topical <User Schedule>  diphtheria/tetanus/pertussis (acellular) Vaccine (Adacel) 0.5 milliLiter(s) IntraMuscular once  heparin  Infusion. 1300 Unit(s)/Hr (13 mL/Hr) IV Continuous <Continuous>  lactated ringers. 1000 milliLiter(s) (150 mL/Hr) IV Continuous <Continuous>  levETIRAcetam  IVPB 1500 milliGRAM(s) IV Intermittent every 12 hours  potassium chloride   Powder 40 milliEquivalent(s) Oral once  propofol Infusion 20 MICROgram(s)/kG/Min (11.5 mL/Hr) IV Continuous <Continuous>  vancomycin  IVPB 1000 milliGRAM(s) IV Intermittent every 8 hours      PHYSICAL EXAM:  T(C): 38.4 (22 @ 20:00), Max: 38.4 (22 @ 12:00)  HR: 90 (22 @ 23:00) (84 - 105)  BP: 117/77 (22 @ 23:00) (111/77 - 170/106)  RR: 16 (22 @ 22:00) (16 - 16)  SpO2: 94% (22 @ 23:00) (90% - 100%)  Wt(kg): --  I&O's Summary    04 Dec 2022 07:01  -  05 Dec 2022 07:00  --------------------------------------------------------  IN: 4265 mL / OUT: 4205 mL / NET: 60 mL    05 Dec 2022 07:01  -  05 Dec 2022 23:23  --------------------------------------------------------  IN: 5378 mL / OUT: 2355 mL / NET: 3023 mL          Appearance: sedated   HEENT:  PERRLA   Lymphatic: No lymphadenopathy   Cardiovascular: Normal S1 S2, no JVD  Respiratory: normal effort , clear  Gastrointestinal:  Soft, Non-tender  Skin: No rashes,  warm to touch  Musculuskeletal: No edema      All labs, Imaging and EKGs personally reviewed       22 @ 07:01  -  22 @ 07:00  --------------------------------------------------------  IN: 4265 mL / OUT: 4205 mL / NET: 60 mL    22 @ 07:01  -  22 @ 23:23  --------------------------------------------------------  IN: 5378 mL / OUT: 2355 mL / NET: 3023 mL                          10.7   12.26 )-----------( 135      ( 05 Dec 2022 10:45 )             32.3                   136  |  99  |  12  ----------------------------<  104<H>  3.5   |  27  |  0.86    Ca    7.7<L>      05 Dec 2022 21:16  Phos  3.1       Mg     1.80         TPro  5.8<L>  /  Alb  2.7<L>  /  TBili  0.5  /  DBili  x   /  AST  229<H>  /  ALT  92<H>  /  AlkPhos  60      PTT - ( 05 Dec 2022 17:58 )  PTT:45.5 sec       CARDIAC MARKERS ( 05 Dec 2022 21:16 )  x     / x     / 5106 U/L / x     / x      CARDIAC MARKERS ( 04 Dec 2022 12:50 )  x     / x     / x     / x     / 17.8 ng/mL  CARDIAC MARKERS ( 04 Dec 2022 00:15 )  x     / x     / 96044 U/L / x     / x                  Urinalysis Basic - ( 05 Dec 2022 21:00 )    Color: Yellow / Appearance: Clear / S.048 / pH: x  Gluc: x / Ketone: Moderate  / Bili: Negative / Urobili: <2 mg/dL   Blood: x / Protein: 100 mg/dL / Nitrite: Negative   Leuk Esterase: Negative / RBC: 0 /HPF / WBC 1 /HPF   Sq Epi: x / Non Sq Epi: x / Bacteria: x

## 2022-12-05 NOTE — AIRWAY PLACEMENT NOTE ADULT - AIRWAY COMMENTS:
Anesthesia called for airway placement 2/2 patient respiratory failure i/s/o status epilepticus. Induction w/ IV propofol 100mg and rocuronium 100mg. Intubation w/ 7.5mm ETT via Glidescope, secured at 23cm at lips. No complications, equal and bilateral breath sounds, positive end-tidal CO2.

## 2022-12-05 NOTE — RAPID RESPONSE TEAM SUMMARY - NSMEDICATIONSRRT_GEN_ALL_CORE
Ativan 2mg IVP X3 (10 min between first 2 doses, then 5 min apart)  Intubated with propofol 100mg and Rocuronium 100 mg  Started on Levophed for pressor-support  CT Head Urgent obtained Ativan 2mg IVP X3 (10 min between first 2 doses, then 5 min apart).  Intubated with propofol 100mg and Rocuronium 100 mg.  Started on Levophed for pressor-support.  CT Head Urgent obtained.  Heparin drip paused.

## 2022-12-05 NOTE — PROGRESS NOTE ADULT - ASSESSMENT
52-year-old male history of a prior stroke with no residual on Eliquis, high cholesterol brought in by EMS found unresponsive at home. General surgery called for incidental finding of pSBO on CT.    Imaging reviewed with radiology, very low likelihood of SBO.      Plan:  - No acute surgical intervention at this time  - Continue to monitor for bowel function  - Continue to trend lactate   - Remainder of care per primary       B Team Surgery  [id number]

## 2022-12-05 NOTE — PROGRESS NOTE ADULT - SUBJECTIVE AND OBJECTIVE BOX
Patient is a 52y old  Male who presents with a chief complaint of Unresponsive (04 Dec 2022 07:55)      HPI: 52-year-old male with a PMHx significant for TIAs (2011, 2013), CVAs x3 (02/2022 s/p tPA, 8/3/2022 s/p tPA, 11/5/2022 with residual expressive aphasia) on Eliquis, and HLD BIBEMS after being found unresponsive at home. Per patient's 2 friends in the ED, patient was last spoken to around noon on 12/1. No one had heard from him or seen him since yesterday, was not responding to calls this am, so they went to check on him today and they found him unresponsive at home with head on floor turned to the side and one leg was on the bed. Patient was snoring per his friends. Friends called EMS. EMS states he was grunting but minimally responsive. Patient arrived to the ER covered in dried blood in his mouth with multiple ecchymosis on the left side of his face, neck,, chest, arm, thigh, LE. Patient was obtunded. Friend denies hx of ETOH or drug use. Of note, patient had a questionable ASD/PFO with possible closure on 11/22/22; per patient's friends no PFO was found and no closure was performed by Dr. Lynn.  Per outpatient records, patient previously on testosterone transdermal solution which was stopped on 11/15 by his urologist. Per patient's friends at bedside, patient was recently prescribed a mail-order testosterone replacement, unsure of the name and unsure if he received and started the medication. At baseline, patient is reported to be AOx3, able to ambulate without assistance, and takes care of ADLs without assistance.     On arrival in the ED, patient with rectal temp 105.4 F, tachy to 130s, hypertensive to 180/110, and tachypneic to 30. C-collar was placed. Patient was intubated in the ED for airway protection with etomidate and succinylcholine. Patient sedated with propofol and fentanyl. Started on vanco and zosyn. 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 CT cervical spine and maxillofacial scans negative. CT chest, abdomen, and pelvis w/ contrast concerning for possible partial SBO without transition point. Surgery consulted in ED, no acute surgical intervention. CT thoracic and lumbar spine showing degenerative disc disease T6-T7 through L4-L5 and mild disc bulges at L2-L3 through L4-L5 that flatten the ventral thecal sac and narrowing of the bilateral neural foramina.    Patient was admitted to the MICU for further care.     INTERVAL HPI/OVERNIGHT EVENTS:   Patient was extubated 12/4/22. Video EEG with no identified seizure activity but did demonstrate cognitive slowing. Follows Dr. Forde OP neurology who has seen him in the hospital. Patient AOx2 (person, place) and following commands. States he fell at home, but unable to give further information. Patient off of pressors 12/4. Patient transferred out of MICU to floors 12/4 pm. 12/5 am RRT called for seizure like activity with urinary incontinence, tongue biting, and right > left posturing (R arm and R leg kicking), patient s/p ativan 2mg x3. patient with gurgling when he was laid down flat. Anesthesia called to RRT for intubation. Patient will be transferred back to MICU.     Vital Signs Last 24 Hrs  ICU Vital Signs Last 24 Hrs  T(C): 37.9 (05 Dec 2022 08:00), Max: 37.9 (05 Dec 2022 08:00)  T(F): 100.3 (05 Dec 2022 08:00), Max: 100.3 (05 Dec 2022 08:00)  HR: 98 (05 Dec 2022 08:21) (48 - 98)  BP: 131/81 (05 Dec 2022 08:00) (87/53 - 145/87)  BP(mean): 96 (05 Dec 2022 08:00) (65 - 117)  ABP: --  ABP(mean): --  RR: 16 (05 Dec 2022 08:00) (9 - 21)  SpO2: 100% (05 Dec 2022 08:21) (96% - 100%)    O2 Parameters below as of 05 Dec 2022 08:00  Patient On (Oxygen Delivery Method): ventilator    O2 Concentration (%): 40    Mode: AC/ CMV (Assist Control/ Continuous Mandatory Ventilation)  RR (machine): 16  TV (machine): 500  FiO2: 40  PEEP: 5  ITime: 0.89  MAP: 9  PIP: 17        HOSPITAL MEDICATIONS:  MEDICATIONS  (STANDING):  acetaminophen   IVPB .. 1000 milliGRAM(s) IV Intermittent once  aspirin enteric coated 81 milliGRAM(s) Oral daily  chlorhexidine 0.12% Liquid 15 milliLiter(s) Oral Mucosa every 12 hours  chlorhexidine 4% Liquid 1 Application(s) Topical <User Schedule>  diphtheria/tetanus/pertussis (acellular) Vaccine (Adacel) 0.5 milliLiter(s) IntraMuscular once  heparin  Infusion. 1300 Unit(s)/Hr (13 mL/Hr) IV Continuous <Continuous>  lactated ringers. 1000 milliLiter(s) (250 mL/Hr) IV Continuous <Continuous>  levETIRAcetam  IVPB 1500 milliGRAM(s) IV Intermittent <User Schedule>  potassium chloride  10 mEq/100 mL IVPB 10 milliEquivalent(s) IV Intermittent every 1 hour  propofol Infusion 20 MICROgram(s)/kG/Min (11.5 mL/Hr) IV Continuous <Continuous>    MEDICATIONS  (PRN):  aluminum hydroxide/magnesium hydroxide/simethicone Suspension 30 milliLiter(s) Oral every 4 hours PRN Dyspepsia  heparin   Injectable 8000 Unit(s) IV Push every 6 hours PRN For aPTT less than 40  heparin   Injectable 4000 Unit(s) IV Push every 6 hours PRN For aPTT between 40 - 57  melatonin 3 milliGRAM(s) Oral at bedtime PRN Insomnia  ondansetron Injectable 4 milliGRAM(s) IV Push every 8 hours PRN Nausea and/or Vomiting      PHYSICAL EXAM:  GENERAL: NAD, well-groomed, well-developed  HEAD:  Atraumatic, Normocephalic  EYES: EOMI, PERRLA, conjunctiva and sclera clear  ENMT:  Moist mucous membranes  NECK: Supple, No JVD  CHEST/LUNG: Clear to auscultation bilaterally; No rales, rhonchi, wheezing, or rubs  HEART: Regular rate and rhythm; No mrg  ABDOMEN: Soft, Nontender, Nondistended; Bowel sounds present  NEURO: R arm posturing, R leg kicking, and itnermittent left sided posturing   EXTREMITIES: No LE edema, no calf tenderness  SKIN: Multiple bruising and ecchymosis present bilaterally on chest, UE, LE, thighs, etc as noted on admission    Consultant(s) Notes Reviewed:  [x ] YES  [ ] NO  Care Discussed with Consultants/Other Providers [ x] YES  [ ] NO      LABS:                         9.5    13.25 )-----------( x        ( 05 Dec 2022 06:30 )             29.6     12-05    131<L>  |  93<L>  |  10  ----------------------------<  90  2.7<LL>   |  28  |  0.71    Ca    6.8<L>      05 Dec 2022 06:30  Phos  3.3     12-05  Mg     1.70     12-05    TPro  4.9<L>  /  Alb  2.2<L>  /  TBili  0.5  /  DBili  x   /  AST  213<H>  /  ALT  80<H>  /  AlkPhos  49  12-05    PTT - ( 05 Dec 2022 06:30 )  PTT:32.3 sec      CARDIAC MARKERS ( 04 Dec 2022 12:50 )  x     / x     / x     / x     / 17.8 ng/mL  CARDIAC MARKERS ( 04 Dec 2022 00:15 )  x     / x     / 86371 U/L / x     / x      CARDIAC MARKERS ( 03 Dec 2022 18:06 )  x     / x     / 71832 U/L / x     / x      CARDIAC MARKERS ( 03 Dec 2022 13:04 )  x     / x     / 43172 U/L / x     / x          LIVER FUNCTIONS - ( 05 Dec 2022 06:30 )  Alb: 2.2 g/dL / Pro: 4.9 g/dL / ALK PHOS: 49 U/L / ALT: 80 U/L / AST: 213 U/L / GGT: x             Culture - Blood (collected 02 Dec 2022 23:00)  Source: .Blood Blood-Peripheral  Preliminary Report (04 Dec 2022 04:01):    No growth to date.    Culture - Blood (collected 02 Dec 2022 22:45)  Source: .Blood Blood-Peripheral  Preliminary Report (04 Dec 2022 04:01):    No growth to date.    Culture - Urine (collected 02 Dec 2022 22:42)  Source: Catheterized Catheterized  Final Report (04 Dec 2022 13:04):    <10,000 CFU/mL Normal Urogenital Narda    RADIOLOGY, EKG & ADDITIONAL TESTS: Reviewed.         RADIOLOGY & ADDITIONAL TESTS:    EEG Summary / Classification:  Abnormal EEG   - Moderate to severe generalized slowing    EEG Impression / Clinical Correlate:  Abnormal EEG study.  Moderate to severe nonspecific diffuse or multifocal cerebral dysfunction.    Patient is a 52y old  Male who presents with a chief complaint of Unresponsive (04 Dec 2022 07:55)      HPI: 52-year-old male with a PMHx significant for TIAs (2011, 2013), CVAs x3 (02/2022 s/p tPA, 8/3/2022 s/p tPA, 11/5/2022 with residual expressive aphasia) on Eliquis, and HLD BIBEMS after being found unresponsive at home. Per patient's 2 friends in the ED, patient was last spoken to around noon on 12/1. No one had heard from him or seen him since yesterday, was not responding to calls this am, so they went to check on him today and they found him unresponsive at home with head on floor turned to the side and one leg was on the bed. Patient was snoring per his friends. Friends called EMS. EMS states he was grunting but minimally responsive. Patient arrived to the ER covered in dried blood in his mouth with multiple ecchymosis on the left side of his face, neck,, chest, arm, thigh, LE. Patient was obtunded. Friend denies hx of ETOH or drug use. Of note, patient had a questionable ASD/PFO with possible closure on 11/22/22; per patient's friends no PFO was found and no closure was performed by Dr. Lynn.  Per outpatient records, patient previously on testosterone transdermal solution which was stopped on 11/15 by his urologist. Per patient's friends at bedside, patient was recently prescribed a mail-order testosterone replacement, unsure of the name and unsure if he received and started the medication. At baseline, patient is reported to be AOx3, able to ambulate without assistance, and takes care of ADLs without assistance.     On arrival in the ED, patient with rectal temp 105.4 F, tachy to 130s, hypertensive to 180/110, and tachypneic to 30. C-collar was placed. Patient was intubated in the ED for airway protection with etomidate and succinylcholine. Patient sedated with propofol and fentanyl. Started on vanco and zosyn. 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 CT cervical spine and maxillofacial scans negative. CT chest, abdomen, and pelvis w/ contrast concerning for possible partial SBO without transition point. Surgery consulted in ED, no acute surgical intervention. CT thoracic and lumbar spine showing degenerative disc disease T6-T7 through L4-L5 and mild disc bulges at L2-L3 through L4-L5 that flatten the ventral thecal sac and narrowing of the bilateral neural foramina.    Patient was admitted to the MICU for further care.     INTERVAL HPI/OVERNIGHT EVENTS:   Patient was extubated 12/4/22. Video EEG with no identified seizure activity but did demonstrate cognitive slowing. Follows Dr. Forde OP neurology who has seen him in the hospital. Patient AOx2 (person, place) and following commands. States he fell at home, but unable to give further information. Patient off of pressors 12/4. Patient transferred out of MICU to floors 12/4 pm. 12/5 am RRT called for seizure like activity with urinary incontinence, tongue biting, and right > left posturing (R arm and R leg kicking), patient s/p ativan 2mg x3. patient with gurgling when he was laid down flat. Anesthesia called to RRT for intubation. Patient will be transferred back to MICU.     Vital Signs Last 24 Hrs  ICU Vital Signs Last 24 Hrs  T(C): 37.9 (05 Dec 2022 08:00), Max: 37.9 (05 Dec 2022 08:00)  T(F): 100.3 (05 Dec 2022 08:00), Max: 100.3 (05 Dec 2022 08:00)  HR: 98 (05 Dec 2022 08:21) (48 - 98)  BP: 131/81 (05 Dec 2022 08:00) (87/53 - 145/87)  BP(mean): 96 (05 Dec 2022 08:00) (65 - 117)  ABP: --  ABP(mean): --  RR: 16 (05 Dec 2022 08:00) (9 - 21)  SpO2: 100% (05 Dec 2022 08:21) (96% - 100%)    O2 Parameters below as of 05 Dec 2022 08:00  Patient On (Oxygen Delivery Method): ventilator    O2 Concentration (%): 40    Mode: AC/ CMV (Assist Control/ Continuous Mandatory Ventilation)  RR (machine): 16  TV (machine): 500  FiO2: 40  PEEP: 5  ITime: 0.89  MAP: 9  PIP: 17        HOSPITAL MEDICATIONS:  MEDICATIONS  (STANDING):  acetaminophen   IVPB .. 1000 milliGRAM(s) IV Intermittent once  aspirin enteric coated 81 milliGRAM(s) Oral daily  chlorhexidine 0.12% Liquid 15 milliLiter(s) Oral Mucosa every 12 hours  chlorhexidine 4% Liquid 1 Application(s) Topical <User Schedule>  diphtheria/tetanus/pertussis (acellular) Vaccine (Adacel) 0.5 milliLiter(s) IntraMuscular once  heparin  Infusion. 1300 Unit(s)/Hr (13 mL/Hr) IV Continuous <Continuous>  lactated ringers. 1000 milliLiter(s) (250 mL/Hr) IV Continuous <Continuous>  levETIRAcetam  IVPB 1500 milliGRAM(s) IV Intermittent <User Schedule>  potassium chloride  10 mEq/100 mL IVPB 10 milliEquivalent(s) IV Intermittent every 1 hour  propofol Infusion 20 MICROgram(s)/kG/Min (11.5 mL/Hr) IV Continuous <Continuous>    MEDICATIONS  (PRN):  aluminum hydroxide/magnesium hydroxide/simethicone Suspension 30 milliLiter(s) Oral every 4 hours PRN Dyspepsia  heparin   Injectable 8000 Unit(s) IV Push every 6 hours PRN For aPTT less than 40  heparin   Injectable 4000 Unit(s) IV Push every 6 hours PRN For aPTT between 40 - 57  melatonin 3 milliGRAM(s) Oral at bedtime PRN Insomnia  ondansetron Injectable 4 milliGRAM(s) IV Push every 8 hours PRN Nausea and/or Vomiting      PHYSICAL EXAM:  GENERAL: NAD, well-groomed, well-developed  HEAD:  Atraumatic, Normocephalic  EYES: EOMI, PERRLA, conjunctiva and sclera clear  ENMT:  Moist mucous membranes  NECK: Supple, No JVD  CHEST/LUNG: Clear to auscultation bilaterally; No rales, rhonchi, wheezing, or rubs on vent for airway protection  HEART: Regular rate and rhythm; No mrg  ABDOMEN: Soft, Nontender, Nondistended; Bowel sounds present, NGT in place for decompression for possible SBO  NEURO: R arm posturing, R leg kicking, and intermittent left sided posturing   EXTREMITIES: No LE edema, no calf tenderness  SKIN: Multiple bruising and ecchymosis present bilaterally on chest, UE, LE, thighs, etc as noted on admission    Consultant(s) Notes Reviewed:  [x ] YES  [ ] NO  Care Discussed with Consultants/Other Providers [ x] YES  [ ] NO      LABS:                         9.5    13.25 )-----------( x        ( 05 Dec 2022 06:30 )             29.6     12-05    131<L>  |  93<L>  |  10  ----------------------------<  90  2.7<LL>   |  28  |  0.71    Ca    6.8<L>      05 Dec 2022 06:30  Phos  3.3     12-05  Mg     1.70     12-05    TPro  4.9<L>  /  Alb  2.2<L>  /  TBili  0.5  /  DBili  x   /  AST  213<H>  /  ALT  80<H>  /  AlkPhos  49  12-05    PTT - ( 05 Dec 2022 06:30 )  PTT:32.3 sec      CARDIAC MARKERS ( 04 Dec 2022 12:50 )  x     / x     / x     / x     / 17.8 ng/mL  CARDIAC MARKERS ( 04 Dec 2022 00:15 )  x     / x     / 49527 U/L / x     / x      CARDIAC MARKERS ( 03 Dec 2022 18:06 )  x     / x     / 17379 U/L / x     / x      CARDIAC MARKERS ( 03 Dec 2022 13:04 )  x     / x     / 65294 U/L / x     / x          LIVER FUNCTIONS - ( 05 Dec 2022 06:30 )  Alb: 2.2 g/dL / Pro: 4.9 g/dL / ALK PHOS: 49 U/L / ALT: 80 U/L / AST: 213 U/L / GGT: x             Culture - Blood (collected 02 Dec 2022 23:00)  Source: .Blood Blood-Peripheral  Preliminary Report (04 Dec 2022 04:01):    No growth to date.    Culture - Blood (collected 02 Dec 2022 22:45)  Source: .Blood Blood-Peripheral  Preliminary Report (04 Dec 2022 04:01):    No growth to date.    Culture - Urine (collected 02 Dec 2022 22:42)  Source: Catheterized Catheterized  Final Report (04 Dec 2022 13:04):    <10,000 CFU/mL Normal Urogenital Narda    RADIOLOGY, EKG & ADDITIONAL TESTS: Reviewed.         RADIOLOGY & ADDITIONAL TESTS:    EEG Summary / Classification:  Abnormal EEG   - Moderate to severe generalized slowing    EEG Impression / Clinical Correlate:  Abnormal EEG study.  Moderate to severe nonspecific diffuse or multifocal cerebral dysfunction.

## 2022-12-05 NOTE — PROGRESS NOTE ADULT - ASSESSMENT
ASSESSMENT & PLAN:	  52-year-old male with a PMHx significant for TIAs (2011, 2013), CVAs x3 (02/2022 s/p tPA, 8/3/2022 s/p tPA, 11/5/2022 with residual expressive aphasia) on Eliquis, and HLD BIBEMS after being found unresponsive at home on the floor, last known well 12/1 at around noon. C-collar was placed. Patient was intubated in the ED for airway protection. CTH with no acute findings, vEEG with no identified seizures. Patient was weaned off pressors, extubated, and transitioned to floors 12/4/22. 12/5 am RRT called for seizure like activity with urinary incontinence, tongue biting, and R>L posturing, patient s/p ativan 2mg x3. Anesthesia called to RRT for intubation. MICU accepting patient for seizure like activity requiring intubation.     NEURO  #AMS, Seizure like activity  - Patient found down and unresponsive at home on 12/2, last known well 12/1 at around noon. Intubated in ED 12/2, extubated in MICU 12/4  - RRT 12/5: seizure like acting with urinary incontinence, convulsions, and tongue biting; s/p ativan 2mg x3 with pauses in seizure activity following each; intubated for airway protection with rocuronium   - CTH and CT cervical spine 12/2 negative for any acute pathologies. Tox screen on admission negative. 12/5: Stable exam from prior [facility]ad, chronic microvascular ischemic changes, parenchymal loss, dystrophic calcification of central portion of alexis unchanged.  - Video EEG 12/3-12/4 without any seizure like activity, moderate to severe nonspecific diffuse or multifocal cerebral dysfunction  - c/w propofol (started 12/5 during RRT)  - c/w keppra 1500mg TID maintenance, Keppra loaded 4.5g  - Pending MR brain for stroke evaluation, patient has loop recorder but MRI compatible per Radiology   - In progress: 24hr Video EEG placed on 12/5  - f/u neuro recs   - f/u prolactin    #CVAs/TIAs  - Hx of TIAs in 2011 and 2013, CVAs x3 in 02/22, 8/22, and 11/22 with residual expressive aphasia   - on Eliquis and Aspirin at home for hx of stroke  - PFO work up in past negative, no evidence of PFO on MIMI X2  - c/w ASA  - c/w heparin gtt  - Restart Statin when LFTs and CPK improve per neurology  - Being worked up for Mixed Connective Tissue Disease OP- f/u p-ANCA, c-ANCA  - f/u Neuro recs    #Thecal sac flattening   - CT thoracic and lumbar spine showing degenerative disc disease T6-T7 through L4-L5 and mild disc bulges at L2-L3 through L4-L5 that flatten the ventral thecal sac and narrowing of the bilateral neural foramina  - Will Monitor for now, will consider neuro/neurosurg evaluation in the future    PULM  #Intubated  - Intubated 12/2, extubated 12/4 in MICU  - Re-intubated 12/5 am for airway protection iso seizure on floors during RRT  - Trend ABGs  - 12/5 CXR: ETT in place, no acute lung pathology    CARDIAC  #HLD  - atorvastatin on hold due to elevated CK and LFTs  - please restart when possible for secondary stroke prevention    #?ASD/PFO  - Patient reportedly found to have a PFO in February 2022 during a stroke workup at [place]. Patient presented for a PFO closure in November 2022. Notably, no PFO was found at the time and no intervention was performed by Dr. Lynn.    - TTE 11/5/22 EF 57% and grossly normal LV function.    RENAL  #Rhabdomyolysis  - CK 8720 on admission, peaked at 15k, now downtrending  - DDx: trauma v hyperthermia (T 105.4 F) v sepsis   - previously on Lasix 40 IVP Q8H, will hold for now and re-evaluate  - c/w aggressive IVFs  - Will trend CKs- still above 10,000  - BMP Q12H with Mg, Phos    GI   #Partial SBO  - CT chest, abdomen, and pelvis w/ contrast concerning for possible partial SBO without transition point.  - Surgery consulted, no acute intervention at this time  - NPO for now with NGT to low wall suction  - Monitor for BMs, monitor for abdominal distension    HEME/ONC  #Leukocytosis  - Concern for septic process  - Trend CBCs    ID  #Concern for sepsis  - Patient initially presented with AMS, T 105.4, tachycardic, and tachypneic   - UA negative  - s/p vanco and zosyn x1 in ED 12/2  - s/p ceftriaxone 2g BID 12/3-12/4  - s/p vanco 12/3-12/4  - BCx and UC 12/2 NGTD  - monitor off ABx for now    ENDO  - No acute issues    DVT  - Hep gtt ASSESSMENT & PLAN:	  52-year-old male with a PMHx significant for TIAs (2011, 2013), CVAs x3 (02/2022 s/p tPA, 8/3/2022 s/p tPA, 11/5/2022 with residual expressive aphasia) on Eliquis, and HLD BIBEMS after being found unresponsive at home on the floor, last known well 12/1 at around noon. C-collar was placed. Patient was intubated in the ED for airway protection. CTH with no acute findings, vEEG with no identified seizures. Patient was weaned off pressors, extubated, and transitioned to floors 12/4/22. 12/5 am RRT called for seizure like activity with urinary incontinence, tongue biting, and R>L posturing, patient s/p ativan 2mg x3. Anesthesia called to RRT for intubation. MICU accepting patient for seizure like activity requiring intubation.     NEURO  #AMS, Seizure like activity  - Patient found down and unresponsive at home on 12/2, last known well 12/1 at around noon. Intubated in ED 12/2, extubated in MICU 12/4  - RRT 12/5: seizure like acting with urinary incontinence, convulsions, and tongue biting; s/p ativan 2mg x3 with pauses in seizure activity following each; intubated for airway protection with rocuronium   - CTH and CT cervical spine 12/2 negative for any acute pathologies. Tox screen on admission negative. 12/5: Stable exam from prior CThead, chronic microvascular ischemic changes, parenchymal loss, dystrophic calcification of central portion of alexis unchanged.  - Video EEG 12/3-12/4 without any seizure like activity, moderate to severe nonspecific diffuse or multifocal cerebral dysfunction  - c/w propofol (started 12/5 during RRT)  - c/w keppra 1500mg TID maintenance, Keppra loaded 4.5g  - Pending MR brain for stroke evaluation, patient has loop recorder but MRI compatible per Radiology   - In progress: 24hr Video EEG placed on 12/5  - f/u neuro recs   - f/u prolactin  - check FS, glucose control <180  - GI/DVT ppx  - Thank you for allowing me to participate in the care of this patient. Call with questions.   - spoke with ICU team      #CVAs/TIAs  - Hx of TIAs in 2011 and 2013, CVAs x3 in 02/22, 8/22, and 11/22 with residual expressive aphasia   - on Eliquis and Aspirin at home for hx of stroke  - PFO work up in past negative, no evidence of PFO on MIMI X2  - c/w ASA   - c/w heparin gtt  - Restart Statin when LFTs and CPK improve per neurology  - Being worked up for Mixed Connective Tissue Disease OP- f/u p-ANCA, c-ANCA  - f/u Neuro recs    #Thecal sac flattening   - CT thoracic and lumbar spine showing degenerative disc disease T6-T7 through L4-L5 and mild disc bulges at L2-L3 through L4-L5 that flatten the ventral thecal sac and narrowing of the bilateral neural foramina  - Will Monitor for now, will consider neuro/neurosurg evaluation in the future    PULM  #Intubated  - Intubated 12/2, extubated 12/4 in MICU  - Re-intubated 12/5 am for airway protection iso seizure on floors during RRT  - Trend ABGs  - 12/5 CXR: ETT in place, no acute lung pathology    CARDIAC  #HLD  - atorvastatin on hold due to elevated CK and LFTs  - please restart when possible for secondary stroke prevention    #?ASD/PFO  - Patient reportedly found to have a PFO in February 2022 during a stroke workup at [place]. Patient presented for a PFO closure in November 2022. Notably, no PFO was found at the time and no intervention was performed by Dr. Lynn.    - TTE 11/5/22 EF 57% and grossly normal LV function.    RENAL  #Rhabdomyolysis  - CK 8720 on admission, peaked at 15k, now downtrending  - DDx: trauma v hyperthermia (T 105.4 F) v sepsis   - previously on Lasix 40 IVP Q8H, will hold for now and re-evaluate  - c/w aggressive IVFs  - Will trend CKs- still above 10,000  - BMP Q12H with Mg, Phos    GI   #Partial SBO  - CT chest, abdomen, and pelvis w/ contrast concerning for possible partial SBO without transition point.  - Surgery consulted, no acute intervention at this time  - NPO for now with NGT to low wall suction  - Monitor for BMs, monitor for abdominal distension    HEME/ONC  #Leukocytosis  - Concern for septic process  - Trend CBCs    ID  #Concern for sepsis  - Patient initially presented with AMS, T 105.4, tachycardic, and tachypneic   - UA negative  - s/p vanco and zosyn x1 in ED 12/2  - s/p ceftriaxone 2g BID 12/3-12/4  - s/p vanco 12/3-12/4  - BCx and UC 12/2 NGTD  - monitor off ABx for now  - fever seems to have resolved.  defer LP for now but can consider in future if no improvement     ENDO  - Concern of exogenous testosterone continued use contributing to hypercoagulation  - No acute issues    DVT  - Hep gtt ASSESSMENT & PLAN:	  52-year-old male with a PMHx significant for TIAs (2011, 2013), CVAs x3 (02/2022 s/p tPA, 8/3/2022 s/p tPA, 11/5/2022 with residual expressive aphasia) on Eliquis, and HLD BIBEMS after being found unresponsive at home on the floor, last known well 12/1 at around noon. C-collar was placed. Patient was intubated in the ED for airway protection. CTH with no acute findings, vEEG with no identified seizures. Patient was weaned off pressors, extubated, and transitioned to floors 12/4/22. 12/5 am RRT called for seizure like activity with urinary incontinence, tongue biting, and R>L posturing, patient s/p ativan 2mg x3. Anesthesia called to RRT for intubation. MICU accepting patient for seizure like activity requiring intubation.     NEURO  #AMS, Seizure like activity  - Patient found down and unresponsive at home on 12/2, last known well 12/1 at around noon. Intubated in ED 12/2, extubated in MICU 12/4  - RRT 12/5: seizure like acting with urinary incontinence, convulsions, and tongue biting; s/p ativan 2mg x3 with pauses in seizure activity following each; intubated for airway protection with rocuronium   - CTH and CT cervical spine 12/2 negative for any acute pathologies. Tox screen on admission negative. 12/5: Stable exam from prior CThead, chronic microvascular ischemic changes, parenchymal loss, dystrophic calcification of central portion of alexis unchanged.  - Video EEG 12/3-12/4 without any seizure like activity, moderate to severe nonspecific diffuse or multifocal cerebral dysfunction  - c/w propofol (started 12/5 during RRT)  - c/w keppra 1500mg TID maintenance, Keppra loaded 4.5g  - Pending MR brain for stroke evaluation, patient has loop recorder but MRI compatible per Radiology   - In progress: 24hr Video EEG placed on 12/5  - f/u neuro recs   - f/u prolactin  - check FS, glucose control <180  - GI/DVT ppx  - Thank you for allowing me to participate in the care of this patient. Call with questions.   - spoke with ICU team      #CVAs/TIAs  - Hx of TIAs in 2011 and 2013, CVAs x3 in 02/22, 8/22, and 11/22 with residual expressive aphasia   - on Eliquis and Aspirin at home for hx of stroke  - PFO work up in past negative, no evidence of PFO on MIMI X2  - c/w ASA   - c/w heparin gtt  - Restart Statin when LFTs and CPK improve per neurology  - Being worked up for Mixed Connective Tissue Disease OP- f/u p-ANCA, c-ANCA  - f/u Neuro recs    #Thecal sac flattening   - CT thoracic and lumbar spine showing degenerative disc disease T6-T7 through L4-L5 and mild disc bulges at L2-L3 through L4-L5 that flatten the ventral thecal sac and narrowing of the bilateral neural foramina  - Will Monitor for now, will consider neuro/neurosurg evaluation in the future    PULM  #Intubated  - Intubated 12/2, extubated 12/4 in MICU  - Re-intubated 12/5 am for airway protection iso seizure on floors during RRT  - Trend ABGs  - 12/5 CXR: ETT in place, no acute lung pathology    CARDIAC  #HLD  - atorvastatin on hold due to elevated CK and LFTs  - please restart when possible for secondary stroke prevention    #?ASD/PFO  - Patient reportedly found to have a PFO in February 2022 during a stroke workup at [place]. Patient presented for a PFO closure in November 2022. Notably, no PFO was found at the time and no intervention was performed by Dr. Lynn.    - TTE 11/5/22 EF 57% and grossly normal LV function.    RENAL  #Rhabdomyolysis  - CK 8720 on admission, peaked at 15k, now downtrending  - DDx: trauma v hyperthermia (T 105.4 F) v sepsis   - previously on Lasix 40 IVP Q8H, will hold for now and re-evaluate  - c/w aggressive IVFs  - Will trend CKs- still above 10,000  - BMP Q12H with Mg, Phos    GI   #Partial SBO  - CT chest, abdomen, and pelvis w/ contrast concerning for possible partial SBO without transition point.  - Surgery consulted, no acute intervention at this time  - NPO for now with NGT to low wall suction  - Monitor for BMs, monitor for abdominal distension    HEME/ONC  #Leukocytosis  - Concern for septic process  - Trend CBCs    ID  #Concern for sepsis  - Patient initially presented with AMS, T 105.4, tachycardic, and tachypneic   - UA negative  - s/p vanco and zosyn x1 in ED 12/2  - s/p ceftriaxone 2g BID 12/3-12/4  - s/p vanco 12/3-12/4  - BCx and UC 12/2 NGTD  - c/w vanc and ceftriaxone for meningitis ppx - fever continues   - Continues with fever - possible LP     ENDO  - Concern of continued exogenous testosterone use contributing to hypercoagulation?  - F/u endo consult  - No acute issues    DVT  - Hep gtt ASSESSMENT & PLAN:	  52-year-old male with a PMHx significant for TIAs (2011, 2013), CVAs x3 (02/2022 s/p tPA, 8/3/2022 s/p tPA, 11/5/2022 with residual expressive aphasia) on Eliquis, and HLD BIBEMS after being found unresponsive at home on the floor, last known well 12/1 at around noon. C-collar was placed. Patient was intubated in the ED for airway protection. CTH with no acute findings, vEEG with no identified seizures. Patient was weaned off pressors, extubated, and transitioned to floors 12/4/22. 12/5 am RRT called for seizure like activity with urinary incontinence, tongue biting, and R>L posturing, patient s/p ativan 2mg x3. Anesthesia called to RRT for intubation. MICU accepting patient for seizure like activity requiring intubation.     NEURO  #AMS, Seizure like activity  - Patient found down and unresponsive at home on 12/2, last known well 12/1 at around noon. Intubated in ED 12/2, extubated in MICU 12/4  - RRT 12/5: seizure like acting with urinary incontinence, convulsions, and tongue biting; s/p ativan 2mg x3 with pauses in seizure activity following each; intubated for airway protection with rocuronium   - CTH and CT cervical spine 12/2 negative for any acute pathologies. Tox screen on admission negative. 12/5: Stable exam from prior CThead, chronic microvascular ischemic changes, parenchymal loss, dystrophic calcification of central portion of alexis unchanged.  - Video EEG 12/3-12/4 without any seizure like activity, moderate to severe nonspecific diffuse or multifocal cerebral dysfunction  - c/w propofol (started 12/5 during RRT)  - c/w keppra 1500mg TID maintenance, Keppra loaded 4.5g  - Pending MR brain for stroke evaluation, patient has loop recorder but MRI compatible per Radiology   - In progress: 24hr Video EEG placed on 12/5  - f/u neuro recs   - f/u prolactin  - check FS, glucose control <180  - GI/DVT ppx  - Thank you for allowing me to participate in the care of this patient. Call with questions.   - spoke with ICU team      #CVAs/TIAs  - Hx of TIAs in 2011 and 2013, CVAs x3 in 02/22, 8/22, and 11/22 with residual expressive aphasia   - on Eliquis and Aspirin at home for hx of stroke  - PFO work up in past negative, no evidence of PFO on MIMI X2  - c/w ASA   - c/w heparin gtt  - Restart Statin when LFTs and CPK improve per neurology  - Being worked up for Mixed Connective Tissue Disease OP- f/u p-ANCA, c-ANCA  - f/u Neuro recs    #Thecal sac flattening   - CT thoracic and lumbar spine showing degenerative disc disease T6-T7 through L4-L5 and mild disc bulges at L2-L3 through L4-L5 that flatten the ventral thecal sac and narrowing of the bilateral neural foramina  - Will Monitor for now, will consider neuro/neurosurg evaluation in the future    PULM  #Intubated  - Intubated 12/2, extubated 12/4 in MICU  - Re-intubated 12/5 am for airway protection iso seizure on floors during RRT  - Trend ABGs  - 12/5 CXR: ETT in place, no acute lung pathology    CARDIAC  #HLD  - atorvastatin on hold due to elevated CK and LFTs  - please restart when possible for secondary stroke prevention    #?ASD/PFO  - Patient reportedly found to have a PFO in February 2022 during a stroke workup at [place]. Patient presented for a PFO closure in November 2022. Notably, no PFO was found at the time and no intervention was performed by Dr. Lynn.    - TTE 11/5/22 EF 57% and grossly normal LV function.    RENAL  #Rhabdomyolysis  - CK 8720 on admission, peaked at 15k, now downtrending  - DDx: prolonged downtime myocitis v hyperthermia? (T 105.4 F) v sepsis   - c/w aggressive IVFs  - Will trend CKs- still above 10,000  - BMP Q12H with Mg, Phos    GI   #Partial SBO  - CT chest, abdomen, and pelvis w/ contrast concerning for possible partial SBO without transition point.  - Surgery consulted, no acute intervention at this time  - NPO for now with NGT to low wall suction  - Monitor for BMs, monitor for abdominal distension    HEME/ONC  #Leukocytosis  - Concern for septic process  - Trend CBCs    ID  #Concern for sepsis  - Patient initially presented with AMS, T 105.4, tachycardic, and tachypneic   - UA negative  - s/p vanco and zosyn x1 in ED 12/2  - s/p ceftriaxone 2g BID 12/3-12/4  - s/p vanco 12/3-12/4  - BCx and UC 12/2 NGTD  - c/w vanc and ceftriaxone for meningitis ppx - fever continues   - Continues with fever - possible LP     ENDO  - Concern of continued exogenous testosterone use contributing to hypercoagulation?  - F/u endo consult  - No acute issues    DVT  - Hep gtt ASSESSMENT & PLAN:	  52-year-old male with a PMHx significant for TIAs (2011, 2013), CVAs x3 (02/2022 s/p tPA, 8/3/2022 s/p tPA, 11/5/2022 with residual expressive aphasia) on Eliquis, and HLD BIBEMS after being found unresponsive at home on the floor, last known well 12/1 at around noon. C-collar was placed. Patient was intubated in the ED for airway protection. CTH with no acute findings, vEEG with no identified seizures. Patient was weaned off pressors, extubated, and transitioned to floors 12/4/22. 12/5 am RRT called for seizure like activity with urinary incontinence, tongue biting, and R>L posturing, patient s/p ativan 2mg x3. Anesthesia called to RRT for intubation. MICU accepting patient for seizure like activity requiring intubation.     NEURO  #AMS, Seizure like activity  - Patient found down and unresponsive at home on 12/2, last known well 12/1 at around noon. Intubated in ED 12/2, extubated in MICU 12/4  - RRT 12/5: seizure like acting with urinary incontinence, convulsions, and tongue biting; s/p ativan 2mg x3 with pauses in seizure activity following each; intubated for airway protection with rocuronium   - CTH and CT cervical spine 12/2 negative for any acute pathologies. Tox screen on admission negative. 12/5: Stable exam from prior CThead, chronic microvascular ischemic changes, parenchymal loss, dystrophic calcification of central portion of alexis unchanged.  - Video EEG 12/3-12/4 without any seizure like activity, moderate to severe nonspecific diffuse or multifocal cerebral dysfunction  - c/w propofol (started 12/5 during RRT)  - c/w keppra 1500mg TID maintenance, Keppra loaded 4.5g  - Pending MR brain for stroke evaluation, patient has loop recorder but MRI compatible per Radiology   - In progress: 24hr Video EEG placed on 12/5  - f/u neuro recs   - f/u prolactin      #CVAs/TIAs  - Hx of TIAs in 2011 and 2013, CVAs x3 in 02/22, 8/22, and 11/22 with residual expressive aphasia   - on Eliquis and Aspirin at home for hx of stroke  - PFO work up in past negative, no evidence of PFO on MIMI X2  - c/w ASA   - c/w heparin gtt  - Restart Statin when LFTs and CPK improve per neurology  - Being worked up for Mixed Connective Tissue Disease OP- f/u p-ANCA, c-ANCA  - Neuro recs appreciated    #Thecal sac flattening   - CT thoracic and lumbar spine showing degenerative disc disease T6-T7 through L4-L5 and mild disc bulges at L2-L3 through L4-L5 that flatten the ventral thecal sac and narrowing of the bilateral neural foramina  - Will Monitor for now, will consider neuro/neurosurg evaluation in the future    PULM  #Intubated  - Intubated 12/2, extubated 12/4 in MICU  - Re-intubated 12/5 am for airway protection iso seizure on floors during RRT  - Trend ABGs  - 12/5 CXR: ETT in place, no acute lung pathology    CARDIAC  #HLD  - atorvastatin on hold due to elevated CK and LFTs  - please restart when possible for secondary stroke prevention    #?ASD/PFO  - Patient reportedly found to have a PFO in February 2022 during a stroke workup at [place]. Patient presented for a PFO closure in November 2022. Notably, no PFO was found at the time and no intervention was performed by Dr. Lynn.    - TTE 11/5/22 EF 57% and grossly normal LV function.    RENAL  #Rhabdomyolysis  - CK 8720 on admission, peaked at 15k, now downtrending  - DDx: prolonged downtime myocitis v hyperthermia? (T 105.4 F) v sepsis   - c/w aggressive IVFs  - Will trend CKs- still above 10,000  - BMP Q12H with Mg, Phos    GI   #Partial SBO  - CT chest, abdomen, and pelvis w/ contrast concerning for possible partial SBO without transition point.  - Surgery consulted, no acute intervention at this time  - NPO for now with NGT to low wall suction  - Monitor for BMs, monitor for abdominal distension    HEME/ONC  #Leukocytosis  - Concern for septic process  - Trend CBCs    ID  #Concern for sepsis  - Patient initially presented with AMS, T 105.4, tachycardic, and tachypneic   - UA negative  - s/p vanco and zosyn x1 in ED 12/2, ceftriaxone 2g BID 12/3-12/4, vanco 12/3-12/4  - BCx and UC 12/2 NGTD  - c/w vanc and ceftriaxone 2G BID for meningitis ppx as patient with persistent fevers vs aspiration PNA  - f/u Repeat BCx 12/5    ENDO  - Concern of continued exogenous testosterone use contributing to hypercoagulation?  - Consider endo consult  - No acute issues    DVT  - Hep gtt ASSESSMENT & PLAN:	  52-year-old male with a PMHx significant for TIAs (2011, 2013), CVAs x3 (02/2022 s/p tPA, 8/3/2022 s/p tPA, 11/5/2022 with residual expressive aphasia) on Eliquis, and HLD BIBEMS after being found unresponsive at home on the floor, last known well 12/1 at around noon. C-collar was placed. Patient was intubated in the ED for airway protection. CTH with no acute findings, vEEG with no identified seizures. Patient was weaned off pressors, extubated, and transitioned to floors 12/4/22. 12/5 am RRT called for seizure like activity with urinary incontinence, tongue biting, and R>L posturing, patient s/p ativan 2mg x3. Anesthesia called to RRT for intubation. MICU accepting patient for seizure like activity requiring intubation.     NEURO  #AMS, Seizure like activity  - Patient found down and unresponsive at home on 12/2, last known well 12/1 at around noon. Intubated in ED 12/2, extubated in MICU 12/4  - RRT 12/5: seizure like acting with urinary incontinence, convulsions, and tongue biting; s/p ativan 2mg x3 with pauses in seizure activity following each; intubated for airway protection with rocuronium   - CTH and CT cervical spine 12/2 negative for any acute pathologies. Tox screen on admission negative. 12/5: Stable exam from prior CThead, chronic microvascular ischemic changes, parenchymal loss, dystrophic calcification of central portion of alexis unchanged.  - Video EEG 12/3-12/4 without any seizure like activity, moderate to severe nonspecific diffuse or multifocal cerebral dysfunction  - c/w propofol (started 12/5 during RRT)  - c/w keppra 1500mg TID maintenance, Keppra loaded 4.5g  - Pending MR brain for stroke evaluation, patient has loop recorder but MRI compatible per Radiology   - In progress: 24hr Video EEG placed on 12/5  - f/u neuro recs   - f/u prolactin      #CVAs/TIAs  - Hx of TIAs in 2011 and 2013, CVAs x3 in 02/22, 8/22, and 11/22 with residual expressive aphasia   - on Eliquis and Aspirin at home for hx of stroke  - PFO work up in past negative, no evidence of PFO on MIMI X2  - c/w ASA   - c/w heparin gtt  - Restart Statin when LFTs and CPK improve per neurology  - Being worked up for Mixed Connective Tissue Disease OP- f/u p-ANCA, c-ANCA  - Neuro recs appreciated    #Thecal sac flattening   - CT thoracic and lumbar spine showing degenerative disc disease T6-T7 through L4-L5 and mild disc bulges at L2-L3 through L4-L5 that flatten the ventral thecal sac and narrowing of the bilateral neural foramina  - Will Monitor for now, will consider neuro/neurosurg evaluation in the future    PULM  #Intubated  - Intubated 12/2, extubated 12/4 in MICU  - Re-intubated 12/5 am for airway protection iso seizure on floors during RRT  - Trend ABGs  - 12/5 CXR: ETT in place, no acute lung pathology    CARDIAC  #HLD  - atorvastatin on hold due to elevated CK and LFTs  - please restart when possible for secondary stroke prevention    #?ASD/PFO  - Patient reportedly found to have a PFO in February 2022 during a stroke workup at [place]. Patient presented for a PFO closure in November 2022. Notably, no PFO was found at the time and no intervention was performed by Dr. Lynn.    - TTE 11/5/22 EF 57% and grossly normal LV function  - MIMI performed bedside 12/5 1 of 2 studies (+) on bubble study (uploaded to [facility])    RENAL  #Rhabdomyolysis  - CK 8720 on admission, peaked at 15k, now downtrending  - DDx: prolonged downtime myocitis v hyperthermia? (T 105.4 F) v sepsis   - c/w aggressive IVFs  - Will trend CKs- still above 10,000  - BMP Q12H with Mg, Phos    GI   #Partial SBO  - CT chest, abdomen, and pelvis w/ contrast concerning for possible partial SBO without transition point.  - Surgery consulted, no acute intervention at this time  - NPO for now with NGT to low wall suction  - Monitor for BMs, monitor for abdominal distension    HEME/ONC  #Leukocytosis  - Concern for septic process  - Trend CBCs    ID  #Concern for sepsis  - Patient initially presented with AMS, T 105.4, tachycardic, and tachypneic   - UA negative  - s/p vanco and zosyn x1 in ED 12/2, ceftriaxone 2g BID 12/3-12/4, vanco 12/3-12/4  - BCx and UC 12/2 NGTD  - c/w vanc and ceftriaxone 2G BID for meningitis ppx as patient with persistent fevers vs aspiration PNA  - f/u Repeat BCx 12/5    ENDO  - Concern of continued exogenous testosterone use contributing to hypercoagulation?  - Consider endo consult  - No acute issues    DVT  - Hep gtt

## 2022-12-05 NOTE — RAPID RESPONSE TEAM SUMMARY - NSOTHERINTERVENTIONSRRT_GEN_ALL_CORE
Initial posturing relatively unclear whether patient was having seizure-activity as patient with recent EEG in the MICU without findings of seizure-like activity. However, new tongue laceration noted when suctioning blood from patient's mouth and patient also with urinary incontinence episode during one of his posturing episodes. Ativan 2mg IV given with some resolution in patient's posturing but patient began posturing again after a few minutes. Another dose of Ativan 2mg IV given with similar results. Some c/f NMS given rigidity and notable diaphoresis but patient without fever on rectal temp and also no recent medications supporting this, thus lower suspicion. Given patient with continued posturing, unresponsiveness, and highly diaphoretic nature, concern was for status epilepticus. Unclear reasoning for his clinical status, will obtain full set of labs including VBG for hypercapnia, prolactin to assess for possible seizure, TSH/ammonia to assess obtundation, and electrolytes as possible etiology. Neuro called to assess patient bedside to see his current movements. MICU re-called to evaluate again. Another dose of Ativan 2mg IV given to see if could break posturing. Eye exam without gaze deviation and unable to assess full neuro exam given patient's status. Patient was placed on nasal cannula initially then NRB for some mild hypoxia, however, patient later had gurgling episode. Decision made to intubate patient for airway protection. Anesthesia called overhead, patient intubated without complications on above medications and was stable enough to be taken down for urgent CT Head. Patient subsequently transferred to MICU for further management.  Initial posturing relatively unclear whether patient was having seizure-activity as patient with recent EEG in the MICU without findings of seizure-like activity. However, new tongue laceration noted when suctioning blood from patient's mouth and patient also with urinary incontinence episode during one of his posturing episodes. Ativan 2mg IV given with some resolution in patient's posturing but patient began posturing again after a few minutes. Another dose of Ativan 2mg IV given with similar results. Some c/f NMS given rigidity and notable diaphoresis but patient without fever on rectal temp and also no recent medications supporting this, thus lower suspicion. Given patient with continued posturing, unresponsiveness, and highly diaphoretic nature, concern was for status epilepticus. Unclear reasoning for his clinical status, will obtain full set of labs including VBG for hypercapnia, prolactin to assess for possible seizure, TSH/ammonia to assess obtundation, and electrolytes as possible etiology. Neuro called to assess patient bedside to see his current movements. MICU re-called to evaluate again. Another dose of Ativan 2mg IV given to see if could break posturing. Eye exam without gaze deviation and unable to assess full neuro exam given patient's status. Patient was placed on nasal cannula initially then NRB for some mild hypoxia, however, patient later had gurgling episode. Decision made to intubate patient for airway protection. Anesthesia called overhead, patient intubated without complications on above medications and was stable enough to be taken down for urgent CT Head. Patient subsequently transferred to MICU for further management.   For follow-up in MICU:  [ ] F/u CT Head  [ ] Likely will need Keppra load  [ ] F/u Neuro recs  [ ] Heparin discontinued during rapid due to c/f acute bleed and change in status, re-assess heparin drip  [ ] F/u labs obtained during rapid Initial posturing relatively unclear whether patient was having seizure-activity as patient with recent EEG in the MICU without findings of seizure-like activity. However, new tongue laceration noted when suctioning blood from patient's mouth and patient also with urinary incontinence episode during one of his posturing episodes. Ativan 2mg IV given with some resolution in patient's posturing but patient began posturing again after a few minutes. Another dose of Ativan 2mg IV given with similar results. Some c/f NMS given rigidity and notable diaphoresis but patient without fever on rectal temp and also no recent medications supporting this, thus lower suspicion. Given patient with continued posturing, unresponsiveness, and highly diaphoretic nature, concern was for status epilepticus. Unclear reasoning for his clinical status, will obtain full set of labs including VBG for hypercapnia, prolactin to assess for possible seizure, TSH/ammonia to assess obtundation, and electrolytes as possible etiology. Neuro called to assess patient bedside to see his current movements. MICU re-called to evaluate again. Another dose of Ativan 2mg IV given to see if could break posturing. Eye exam without gaze deviation and unable to assess full neuro exam given patient's status. Patient was placed on nasal cannula initially then NRB for some mild hypoxia, however, patient later had gurgling episode. Decision made to intubate patient for airway protection. Anesthesia called overhead, patient intubated without complications on above medications and was stable enough to be taken down for urgent CT Head. Patient subsequently transferred to MICU for further management.   For follow-up in MICU:  [ ] F/u CT Head  [ ] F/u labs obtained during rapid  [ ] Still pending MRI  [ ] Likely will need Keppra load  [ ] F/u Neuro recs  [ ] Heparin discontinued during rapid due to c/f acute bleed and change in status, re-assess heparin drip

## 2022-12-05 NOTE — RAPID RESPONSE TEAM SUMMARY - NSADDTLFINDINGSRRT_GEN_ALL_CORE
Upon arrival of rapid response team, patient was found to be unresponsive to sternal rub, posturing with mainly his right arm and right leg but intermittently with left, and diaphoretic. Patient also with rigidity in his limbs when not moving them but afebrile on rectal temp (99.7F). HR 95, /88, O2 sat at 92% on RA (but reportedly dropped in lower 80s earlier), and . Was reportedly able to answer questions when being downgraded from the MICU.

## 2022-12-05 NOTE — PROGRESS NOTE ADULT - ATTENDING COMMENTS
Agree with above. Seen and examined with team on rounds. Critically ill on vent requiring frequent bedside visits. Intubated for AMS and possible seizures. Unclear etiology of the seizures. On Kepra for prophylaxis. Will check EEG, MRI brain, MIMI. Supportive care.

## 2022-12-05 NOTE — CHART NOTE - NSCHARTNOTEFT_GEN_A_CORE
Procedure: Transesophageal Echocardiogram  Indication: cryptogenic stroke  Consent: written, in chart  Operators: , [facility], Dr Jamison Bow[facility], Dr Master Santiago    Findings:    AV:  Normal valve morphology. No vegetations or calcifications noted. No regurgitation or stenosis noted.     MV: Normal valve morphology. No vegetations or calcifications noted. No regurgitation or stenosis noted.     PV:  not examined    TV: Normal valve morphology. No vegetations or calcifications noted. No regurgitation or stenosis noted.     LV: Normal size and function    RV:  Normal size and function    LA:  Normal size    RA: Normal size    SVC:  No significant respirophasic variation    Aorta: No calcifications, thrombus, or dissection noted    PA: Normal size and caliber.     Doppler:  Note:   VTI 17      Other:   Bubble study performed using upper extremity with brisk IVC flow disrupting flow of bubbles from SVC towards the interatrial septum. Right femoral venous catheter placed at bedside and bubble study from lower extremity performed x 2. Single bubble noted in the left atrium during first lower extremity bubble study.       Assessment and Plan:   No evidence of cardiogenic, obstructive, or hypovolemic shock.   No diastolic dysfunction  Single bubble noted in left atrium after injection of agitated saline contrast in right lower extremity femoral vein.     Images stored in [facility]. Procedure: Transesophageal Echocardiogram  Indication: cryptogenic stroke  Consent: written, in chart  [facility] , [facility] Dr Jamison Bow[facility], Dr Master Santiago    Findings:    AV:  Normal valve morphology. No vegetations or calcifications noted. No regurgitation or stenosis noted.     MV: Normal valve morphology. No vegetations or calcifications noted. No regurgitation or stenosis noted.     PV:  not examined    TV: Normal valve morphology. No vegetations or calcifications noted. No regurgitation or stenosis noted.     LV: Normal size and function    RV:  Normal size and function    LA:  Normal size BOZENA normal    RA: Normal size    SVC:  No significant respirophasic variation    Aorta: No calcifications, thrombus, or dissection noted    PA: Normal size and caliber.     Doppler:  LVOT VTI  17.5 cm  E 89 cm/sec e' 18 cm/sec MPA AT 86 msec BOZENA PW velocity > 50 cm/sec      Other:   ACS study performed using upper extremity with brisk IVC flow disrupting flow of bubbles from SVC towards the interatrial septum. Right 20 G right femoral venous catheter placed at bedside with US guidance and ACS study from lower extremity performed x 2. Single bubble noted in the left atrium during first lower extremity bubble study. Fem line removed post procedure       Assessment and Plan:   Normal MIMI study No intracardiac source of embolism  Single bubble noted in left atrium after injection of agitated saline contrast in right lower extremity femoral vein. Small PFO with single bubble in LA with 3-4 beats of RA opacificaiton raising possibility of paradoxical embolism risk    Images stored in [facility].

## 2022-12-05 NOTE — CHART NOTE - NSCHARTNOTEFT_GEN_A_CORE
MICU ACCEPT NOTE    Patient is a 52y old  Male who presents with a chief complaint of Unresponsive (04 Dec 2022 07:55)      HPI: 52-year-old male with a PMHx significant for TIAs (2011, 2013), CVAs x3 (02/2022 s/p tPA, 8/3/2022 s/p tPA, 11/5/2022 with residual expressive aphasia) on Eliquis, and HLD BIBEMS after being found unresponsive at home. Per patient's 2 friends in the ED, patient was last spoken to around noon on 12/1. No one had heard from him or seen him since yesterday, was not responding to calls this am, so they went to check on him today and they found him unresponsive at home with head on floor turned to the side and one leg was on the bed. Patient was snoring per his friends. Friends called EMS. EMS states he was grunting but minimally responsive. Patient arrived to the ER covered in dried blood in his mouth with multiple ecchymosis on the left side of his face, neck,, chest, arm, thigh, LE. Patient was obtunded. Friend denies hx of ETOH or drug use. Of note, patient had a questionable ASD/PFO with possible closure on 11/22/22; per patient's friends no PFO was found and no closure was performed by Dr. Lynn.  Per outpatient records, patient previously on testosterone transdermal solution which was stopped on 11/15 by his urologist. Per patient's friends at bedside, patient was recently prescribed a mail-order testosterone replacement, unsure of the name and unsure if he received and started the medication. At baseline, patient is reported to be AOx3, able to ambulate without assistance, and takes care of ADLs without assistance.     On arrival in the ED, patient with rectal temp 105.4 F, tachy to 130s, hypertensive to 180/110, and tachypneic to 30. C-collar was placed. Patient was intubated in the ED for airway protection with etomidate and succinylcholine. Patient sedated with propofol and fentanyl. Started on vanco and zosyn. 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 CT cervical spine and maxillofacial scans negative. CT chest, abdomen, and pelvis w/ contrast concerning for possible partial SBO without transition point. Surgery consulted in ED, no acute surgical intervention. CT thoracic and lumbar spine showing degenerative disc disease T6-T7 through L4-L5 and mild disc bulges at L2-L3 through L4-L5 that flatten the ventral thecal sac and narrowing of the bilateral neural foramina.    Patient was admitted to the MICU for further care.     INTERVAL HPI/OVERNIGHT EVENTS:   Patient was extubated 12/4/22. Video EEG with no identified seizure activity but did demonstrate cognitive slowing. Follows Dr. Eula POLLARD neurology who has seen him in the hospital. Patient AOx2 (person, place) and following commands. States he fell at home, but unable to give further information. Patient off of pressors 12/4. Patient transferred out of MICU to floors 12/4 pm. 12/5 am RRT called for seizure like activity with urinary incontinence and convulsions, patient s/p ativan 2mg x3. Anesthesia called to RRT for intubation. Patient will be transferred back to MICU.     Vital Signs Last 24 Hrs  T(C): 36.6 (04 Dec 2022 22:55), Max: 37 (04 Dec 2022 20:00)  T(F): 97.8 (04 Dec 2022 22:55), Max: 98.6 (04 Dec 2022 20:00)  HR: 66 (04 Dec 2022 23:10) (48 - 88)  BP: 145/87 (04 Dec 2022 23:10) (84/53 - 145/87)  BP(mean): 91 (04 Dec 2022 19:00) (63 - 104)  RR: 17 (04 Dec 2022 23:10) (7 - 21)  SpO2: 97% (04 Dec 2022 23:10) (96% - 100%)    O2 Parameters below as of 04 Dec 2022 23:10  Patient On (Oxygen Delivery Method): room air    Mode: standby    12-03 @ 07:01  -  12-04 @ 07:00  --------------------------------------------------------  IN: 7872 mL / OUT: 4590 mL / NET: 3282 mL    12-04 @ 07:01  -  12-05 @ 03:05  --------------------------------------------------------  IN: 3476 mL / OUT: 3605 mL / NET: -129 mL    HOSPITAL MEDICATIONS:  MEDICATIONS  (STANDING):  acetaminophen   IVPB .. 1000 milliGRAM(s) IV Intermittent once  aspirin enteric coated 81 milliGRAM(s) Oral daily  chlorhexidine 4% Liquid 1 Application(s) Topical <User Schedule>  diphtheria/tetanus/pertussis (acellular) Vaccine (Adacel) 0.5 milliLiter(s) IntraMuscular once  furosemide   Injectable 40 milliGRAM(s) IV Push every 8 hours  heparin  Infusion. 1300 Unit(s)/Hr (13 mL/Hr) IV Continuous <Continuous>  lactated ringers. 1000 milliLiter(s) (250 mL/Hr) IV Continuous <Continuous>  LORazepam   Injectable 2 milliGRAM(s) IV Push once  LORazepam   Injectable 2 milliGRAM(s) IV Push once  LORazepam   Injectable 2 milliGRAM(s) IV Push once    MEDICATIONS  (PRN):  aluminum hydroxide/magnesium hydroxide/simethicone Suspension 30 milliLiter(s) Oral every 4 hours PRN Dyspepsia  heparin   Injectable 8000 Unit(s) IV Push every 6 hours PRN For aPTT less than 40  heparin   Injectable 4000 Unit(s) IV Push every 6 hours PRN For aPTT between 40 - 57  melatonin 3 milliGRAM(s) Oral at bedtime PRN Insomnia  ondansetron Injectable 4 milliGRAM(s) IV Push every 8 hours PRN Nausea and/or Vomiting    PHYSICAL EXAM:  GENERAL: NAD, well-groomed, well-developed  HEAD:  Atraumatic, Normocephalic  EYES: EOMI, PERRLA, conjunctiva and sclera clear  ENMT:  Moist mucous membranes  NECK: Supple, No JVD,  CHEST/LUNG: Clear to auscultation bilaterally; No rales, rhonchi, wheezing, or rubs  HEART: Regular rate and rhythm; No mrg  ABDOMEN: Soft, Nontender, Nondistended; Bowel sounds present  NEURO: convulsions  EXTREMITIES: No LE edema, no calf tenderness  SKIN: Multiple bruising and ecchymosis present bilaterally on chest, UE, LE, thighs, etc as noted on admission    Consultant(s) Notes Reviewed:  [x ] YES  [ ] NO  Care Discussed with Consultants/Other Providers [ x] YES  [ ] NO    LABS:                        11.6   16.38 )-----------( 123      ( 04 Dec 2022 00:15 )             34.7     Hgb Trend: 11.6<--, 12.2<--, 11.4<--, 13.9<--, 16.7<--  12-04    137  |  99  |  13  ----------------------------<  140<H>  3.7   |  30  |  0.91    Ca    7.9<L>      04 Dec 2022 12:50  Phos  2.9     12-04  Mg     1.80     12-04    TPro  5.7<L>  /  Alb  2.7<L>  /  TBili  0.4  /  DBili  x   /  AST  233<H>  /  ALT  84<H>  /  AlkPhos  56  12-04    Creatinine Trend: 0.91<--, 0.99<--, 1.09<--, 1.06<--, 1.41<--, 1.49<--  PTT - ( 04 Dec 2022 05:00 )  PTT:75.6 sec    Arterial Blood Gas:  12-03 @ 13:04  7.38/41/170/24/99.5/-0.8  ABG lactate: --  Arterial Blood Gas:  12-03 @ 05:40  7.31/44/147/22/99.2/-4.1  ABG lactate: --      RADIOLOGY & ADDITIONAL TESTS:  EEG Summary / Classification:  Abnormal EEG   - Moderate to severe generalized slowing  EEG Impression / Clinical Correlate:  Abnormal EEG study.  Moderate to severe nonspecific diffuse or multifocal cerebral dysfunction.     ASSESSMENT & PLAN:	  52-year-old male with a PMHx significant for TIAs (2011, 2013), CVAs x3 (02/2022 s/p tPA, 8/3/2022 s/p tPA, 11/5/2022 with residual expressive aphasia) on Eliquis, and HLD BIBEMS after being found unresponsive at home on the floor, last known well 12/1 at around noon. C-collar was placed. Patient was intubated in the ED for airway protection. CTH with no acute findings, vEEG with no identified seizures. Patient was weaned off pressors, extubated, and transitioned to floors 12/4/22. 12/5 am RRT called for seizure like activity with urinary incontinence and convulsions, patient s/p ativan 2mg x3. Anesthesia called to RRT for intubation. MICU accepting patient for seizure like activity requiring intubation.     NEURO  #AMS, Seizure like activity  - Patient found down and unresponsive at home on 12/2, last known well 12/1 at around noon. Intubated in ED 12/2, extubated in MICU 12/4, and now re-intubated 12/5 for seizure like activity during RRT  - RRT 12/5: seizure like acting with urinary incontinence, convulsions, and tongue biting; s/p ativan 2mg x3; intubated for airway protection  - Pt has significant history of TIAs/CVAs with negative outpatient workup of underlying etiology  - CTH and CT cervical spine 12/2 negative for any acute pathologies. Tox screen on admission negative  - Video EEG 12/3-12/4 without any seizure like activity, moderate to severe nonspecific diffuse or multifocal cerebral dysfunction  - f/u MR brain for stroke evaluation, patient has loop recorder but MRI compatible per Radiology   - f/u repeat   -       #CVAs/TIAs  - Hx of TIAs in 2011 and 2013, CVAs x3 in 02/22, 8/22, and 11/22 with residual expressive aphasia   - on Eliquis and Aspirin at home   - c/w ASA  - c/w heparin gtt  - Restart Statin when LFTs and CPK improve per neurology  - Being worked up for Mixed Connective Tissue Disease OP- f/u p-ANCA, c-ANCA  [ ] f/u Neuro recs    #Thecal sac flattening   - CT thoracic and lumbar spine showing degenerative disc disease T6-T7 through L4-L5 and mild disc bulges at L2-L3 through L4-L5 that flatten the ventral thecal sac and narrowing of the bilateral neural foramina  - Will Monitor for now, will consider neuro/neurosurg evaluation in the future    CARDIAC  #HLD  - atorvastatin on hold due to elevated CK and LFTs  - please restart ASAP for secondary stroke prevention    #?ASD/PFO  - Patient reportedly found to have a PFO in February 2022 during a stroke workup at [facility]. Patient presented for a PFO closure in November 2022. Notably, no PFO was found at the time and no intervention was performed by Dr. Lynn.    - TTE 11/5/22 EF 57% and grossly normal LV function    #Elevated troponins  - Troponin 197 then 181  [- ECG no acute STEMI    PULM  #Intubated in ED on 12/2  - Intubated for airway protection  - Trend VBGs/ABGs  - Extubated 12/4/22  - saturating well on 2L NC    RENAL  #JOSEFINA  - Cr 1.49 on presentation, previous baseline 0.9 range  - DDx: decreased PO intake x24hrs v sepsis (cloudy urine seen at bedside) v rhabdo  - Trend Cr- .99    #Rhabdomyolysis  - CK 8720 on admission  - DDx: trauma v hyperthermia (T 105.4 F) v sepsis   - Will trend CKs- still above 10,000  - Increased LR rate to 250 cc/hr  - Also on standing Lasix 40 IVP Q8H for now for goal  cc/hr  [ ] Trend CK  [ ] BMP Q12H with Mg, Phos  - further consideration of Lasix should be done based on lab testing    GI   #Partial SBO  - CT chest, abdomen, and pelvis w/ contrast concerning for possible partial SBO without transition point.  - Surgery consulted, no acute intervention at this time  - NGT placed in ED  - NPO for now with NGT to low wall suction  - Monitor for BMs, monitor for abdominal distension  - consider restarting feeds as MS improves    HEME/ONC  #Leukocytosis  - Concern for septic process  - Trend CBCs    ID  #Concern for sepsis  - Patient presented with AMS, T 105.4, tachycardic, and tachypneic   - UA negative, follow up repeat given cloudy urine at bedside   - s/p vanco and zosyn x1 in ED  - c/w vanco, adjust by trough after 4th dose  - s/p ceftriaxone 2g BID given c/f meningitis   - F/u BCx x2, UCx- NGTD  - per neuro, deferring LP for now  - d/c CTX and Vanc for now in setting of no fever and neg cultures, can restart if infection suspicion resurfaces    #Febrile  - Temp 105.4 F on admission  - Afebrile currently    ENDO  - No acute issues    DVT  - Hep gtt MICU ACCEPT NOTE    Patient is a 52y old  Male who presents with a chief complaint of Unresponsive (04 Dec 2022 07:55)      HPI: 52-year-old male with a PMHx significant for TIAs (2011, 2013), CVAs x3 (02/2022 s/p tPA, 8/3/2022 s/p tPA, 11/5/2022 with residual expressive aphasia) on Eliquis, and HLD BIBEMS after being found unresponsive at home. Per patient's 2 friends in the ED, patient was last spoken to around noon on 12/1. No one had heard from him or seen him since yesterday, was not responding to calls this am, so they went to check on him today and they found him unresponsive at home with head on floor turned to the side and one leg was on the bed. Patient was snoring per his friends. Friends called EMS. EMS states he was grunting but minimally responsive. Patient arrived to the ER covered in dried blood in his mouth with multiple ecchymosis on the left side of his face, neck,, chest, arm, thigh, LE. Patient was obtunded. Friend denies hx of ETOH or drug use. Of note, patient had a questionable ASD/PFO with possible closure on 11/22/22; per patient's friends no PFO was found and no closure was performed by Dr. Lynn.  Per outpatient records, patient previously on testosterone transdermal solution which was stopped on 11/15 by his urologist. Per patient's friends at bedside, patient was recently prescribed a mail-order testosterone replacement, unsure of the name and unsure if he received and started the medication. At baseline, patient is reported to be AOx3, able to ambulate without assistance, and takes care of ADLs without assistance.     On arrival in the ED, patient with rectal temp 105.4 F, tachy to 130s, hypertensive to 180/110, and tachypneic to 30. C-collar was placed. Patient was intubated in the ED for airway protection with etomidate and succinylcholine. Patient sedated with propofol and fentanyl. Started on vanco and zosyn. 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 CT cervical spine and maxillofacial scans negative. CT chest, abdomen, and pelvis w/ contrast concerning for possible partial SBO without transition point. Surgery consulted in ED, no acute surgical intervention. CT thoracic and lumbar spine showing degenerative disc disease T6-T7 through L4-L5 and mild disc bulges at L2-L3 through L4-L5 that flatten the ventral thecal sac and narrowing of the bilateral neural foramina.    Patient was admitted to the MICU for further care.     INTERVAL HPI/OVERNIGHT EVENTS:   Patient was extubated 12/4/22. Video EEG with no identified seizure activity but did demonstrate cognitive slowing. Follows Dr. Eula POLLARD neurology who has seen him in the hospital. Patient AOx2 (person, place) and following commands. States he fell at home, but unable to give further information. Patient off of pressors 12/4. Patient transferred out of MICU to floors 12/4 pm. 12/5 am RRT called for seizure like activity with urinary incontinence and convulsions, patient s/p ativan 2mg x3. Anesthesia called to RRT for intubation. Patient will be transferred back to MICU.     Vital Signs Last 24 Hrs  T(C): 36.6 (04 Dec 2022 22:55), Max: 37 (04 Dec 2022 20:00)  T(F): 97.8 (04 Dec 2022 22:55), Max: 98.6 (04 Dec 2022 20:00)  HR: 66 (04 Dec 2022 23:10) (48 - 88)  BP: 145/87 (04 Dec 2022 23:10) (84/53 - 145/87)  BP(mean): 91 (04 Dec 2022 19:00) (63 - 104)  RR: 17 (04 Dec 2022 23:10) (7 - 21)  SpO2: 97% (04 Dec 2022 23:10) (96% - 100%)    O2 Parameters below as of 04 Dec 2022 23:10  Patient On (Oxygen Delivery Method): room air    Mode: standby    12-03 @ 07:01  -  12-04 @ 07:00  --------------------------------------------------------  IN: 7872 mL / OUT: 4590 mL / NET: 3282 mL    12-04 @ 07:01  -  12-05 @ 03:05  --------------------------------------------------------  IN: 3476 mL / OUT: 3605 mL / NET: -129 mL    HOSPITAL MEDICATIONS:  MEDICATIONS  (STANDING):  acetaminophen   IVPB .. 1000 milliGRAM(s) IV Intermittent once  aspirin enteric coated 81 milliGRAM(s) Oral daily  chlorhexidine 4% Liquid 1 Application(s) Topical <User Schedule>  diphtheria/tetanus/pertussis (acellular) Vaccine (Adacel) 0.5 milliLiter(s) IntraMuscular once  furosemide   Injectable 40 milliGRAM(s) IV Push every 8 hours  heparin  Infusion. 1300 Unit(s)/Hr (13 mL/Hr) IV Continuous <Continuous>  lactated ringers. 1000 milliLiter(s) (250 mL/Hr) IV Continuous <Continuous>  LORazepam   Injectable 2 milliGRAM(s) IV Push once  LORazepam   Injectable 2 milliGRAM(s) IV Push once  LORazepam   Injectable 2 milliGRAM(s) IV Push once    MEDICATIONS  (PRN):  aluminum hydroxide/magnesium hydroxide/simethicone Suspension 30 milliLiter(s) Oral every 4 hours PRN Dyspepsia  heparin   Injectable 8000 Unit(s) IV Push every 6 hours PRN For aPTT less than 40  heparin   Injectable 4000 Unit(s) IV Push every 6 hours PRN For aPTT between 40 - 57  melatonin 3 milliGRAM(s) Oral at bedtime PRN Insomnia  ondansetron Injectable 4 milliGRAM(s) IV Push every 8 hours PRN Nausea and/or Vomiting    PHYSICAL EXAM:  GENERAL: NAD, well-groomed, well-developed  HEAD:  Atraumatic, Normocephalic  EYES: EOMI, PERRLA, conjunctiva and sclera clear  ENMT:  Moist mucous membranes  NECK: Supple, No JVD,  CHEST/LUNG: Clear to auscultation bilaterally; No rales, rhonchi, wheezing, or rubs  HEART: Regular rate and rhythm; No mrg  ABDOMEN: Soft, Nontender, Nondistended; Bowel sounds present  NEURO: convulsions  EXTREMITIES: No LE edema, no calf tenderness  SKIN: Multiple bruising and ecchymosis present bilaterally on chest, UE, LE, thighs, etc as noted on admission    Consultant(s) Notes Reviewed:  [x ] YES  [ ] NO  Care Discussed with Consultants/Other Providers [ x] YES  [ ] NO    LABS:                        11.6   16.38 )-----------( 123      ( 04 Dec 2022 00:15 )             34.7     Hgb Trend: 11.6<--, 12.2<--, 11.4<--, 13.9<--, 16.7<--  12-04    137  |  99  |  13  ----------------------------<  140<H>  3.7   |  30  |  0.91    Ca    7.9<L>      04 Dec 2022 12:50  Phos  2.9     12-04  Mg     1.80     12-04    TPro  5.7<L>  /  Alb  2.7<L>  /  TBili  0.4  /  DBili  x   /  AST  233<H>  /  ALT  84<H>  /  AlkPhos  56  12-04    Creatinine Trend: 0.91<--, 0.99<--, 1.09<--, 1.06<--, 1.41<--, 1.49<--  PTT - ( 04 Dec 2022 05:00 )  PTT:75.6 sec    Arterial Blood Gas:  12-03 @ 13:04  7.38/41/170/24/99.5/-0.8  ABG lactate: --  Arterial Blood Gas:  12-03 @ 05:40  7.31/44/147/22/99.2/-4.1  ABG lactate: --      RADIOLOGY & ADDITIONAL TESTS:  EEG Summary / Classification:  Abnormal EEG   - Moderate to severe generalized slowing  EEG Impression / Clinical Correlate:  Abnormal EEG study.  Moderate to severe nonspecific diffuse or multifocal cerebral dysfunction.     ASSESSMENT & PLAN:	  52-year-old male with a PMHx significant for TIAs (2011, 2013), CVAs x3 (02/2022 s/p tPA, 8/3/2022 s/p tPA, 11/5/2022 with residual expressive aphasia) on Eliquis, and HLD BIBEMS after being found unresponsive at home on the floor, last known well 12/1 at around noon. C-collar was placed. Patient was intubated in the ED for airway protection. CTH with no acute findings, vEEG with no identified seizures. Patient was weaned off pressors, extubated, and transitioned to floors 12/4/22. 12/5 am RRT called for seizure like activity with urinary incontinence and convulsions, patient s/p ativan 2mg x3. Anesthesia called to RRT for intubation. MICU accepting patient for seizure like activity requiring intubation.     NEURO  #AMS, Seizure like activity  - Patient found down and unresponsive at home on 12/2, last known well 12/1 at around noon. Intubated in ED 12/2, extubated in MICU 12/4  - RRT 12/5: seizure like acting with urinary incontinence, convulsions, and tongue biting; s/p ativan 2mg x3; intubated for airway protection with rocuronium   - CTH and CT cervical spine 12/2 negative for any acute pathologies. Tox screen on admission negative  - Video EEG 12/3-12/4 without any seizure like activity, moderate to severe nonspecific diffuse or multifocal cerebral dysfunction  - f/u repeat CT head  - f/u MR brain for stroke evaluation, patient has loop recorder but MRI compatible per Radiology   - f/u neuro recs re AED  - Likely will need     #CVAs/TIAs  - Hx of TIAs in 2011 and 2013, CVAs x3 in 02/22, 8/22, and 11/22 with residual expressive aphasia   - on Eliquis and Aspirin at home   - c/w ASA  - c/w heparin gtt  - Restart Statin when LFTs and CPK improve per neurology  - Being worked up for Mixed Connective Tissue Disease OP- f/u p-ANCA, c-ANCA  - f/u Neuro recs    #Thecal sac flattening   - CT thoracic and lumbar spine showing degenerative disc disease T6-T7 through L4-L5 and mild disc bulges at L2-L3 through L4-L5 that flatten the ventral thecal sac and narrowing of the bilateral neural foramina  - Will Monitor for now, will consider neuro/neurosurg evaluation in the future    CARDIAC  #HLD  - atorvastatin on hold due to elevated CK and LFTs  - please restart ASAP for secondary stroke prevention    #?ASD/PFO  - Patient reportedly found to have a PFO in February 2022 during a stroke workup at [place]. Patient presented for a PFO closure in November 2022. Notably, no PFO was found at the time and no intervention was performed by Dr. Lynn.    - TTE 11/5/22 EF 57% and grossly normal LV function    #Elevated troponins  - Troponin 197 then 181  [- ECG no acute STEMI    PULM  #Intubated in ED on 12/2  - Intubated for airway protection  - Trend VBGs/ABGs  - Extubated 12/4/22  - saturating well on 2L NC    RENAL  #JOSEFINA  - Cr 1.49 on presentation, previous baseline 0.9 range  - DDx: decreased PO intake x24hrs v sepsis (cloudy urine seen at bedside) v rhabdo  - Trend Cr- .99    #Rhabdomyolysis  - CK 8720 on admission  - DDx: trauma v hyperthermia (T 105.4 F) v sepsis   - Will trend CKs- still above 10,000  - Increased LR rate to 250 cc/hr  - Also on standing Lasix 40 IVP Q8H for now for goal  cc/hr  [ ] Trend CK  [ ] BMP Q12H with Mg, Phos  - further consideration of Lasix should be done based on lab testing    GI   #Partial SBO  - CT chest, abdomen, and pelvis w/ contrast concerning for possible partial SBO without transition point.  - Surgery consulted, no acute intervention at this time  - NGT placed in ED  - NPO for now with NGT to low wall suction  - Monitor for BMs, monitor for abdominal distension  - consider restarting feeds as MS improves    HEME/ONC  #Leukocytosis  - Concern for septic process  - Trend CBCs    ID  #Concern for sepsis  - Patient presented with AMS, T 105.4, tachycardic, and tachypneic   - UA negative, follow up repeat given cloudy urine at bedside   - s/p vanco and zosyn x1 in ED  - c/w vanco, adjust by trough after 4th dose  - s/p ceftriaxone 2g BID given c/f meningitis   - F/u BCx x2, UCx- NGTD  - per neuro, deferring LP for now  - d/c CTX and Vanc for now in setting of no fever and neg cultures, can restart if infection suspicion resurfaces    #Febrile  - Temp 105.4 F on admission  - Afebrile currently    ENDO  - No acute issues    DVT  - Hep gtt MICU ACCEPT NOTE    Patient is a 52y old  Male who presents with a chief complaint of Unresponsive (04 Dec 2022 07:55)      HPI: 52-year-old male with a PMHx significant for TIAs (2011, 2013), CVAs x3 (02/2022 s/p tPA, 8/3/2022 s/p tPA, 11/5/2022 with residual expressive aphasia) on Eliquis, and HLD BIBEMS after being found unresponsive at home. Per patient's 2 friends in the ED, patient was last spoken to around noon on 12/1. No one had heard from him or seen him since yesterday, was not responding to calls this am, so they went to check on him today and they found him unresponsive at home with head on floor turned to the side and one leg was on the bed. Patient was snoring per his friends. Friends called EMS. EMS states he was grunting but minimally responsive. Patient arrived to the ER covered in dried blood in his mouth with multiple ecchymosis on the left side of his face, neck,, chest, arm, thigh, LE. Patient was obtunded. Friend denies hx of ETOH or drug use. Of note, patient had a questionable ASD/PFO with possible closure on 11/22/22; per patient's friends no PFO was found and no closure was performed by Dr. Lynn.  Per outpatient records, patient previously on testosterone transdermal solution which was stopped on 11/15 by his urologist. Per patient's friends at bedside, patient was recently prescribed a mail-order testosterone replacement, unsure of the name and unsure if he received and started the medication. At baseline, patient is reported to be AOx3, able to ambulate without assistance, and takes care of ADLs without assistance.     On arrival in the ED, patient with rectal temp 105.4 F, tachy to 130s, hypertensive to 180/110, and tachypneic to 30. C-collar was placed. Patient was intubated in the ED for airway protection with etomidate and succinylcholine. Patient sedated with propofol and fentanyl. Started on vanco and zosyn. 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 CT cervical spine and maxillofacial scans negative. CT chest, abdomen, and pelvis w/ contrast concerning for possible partial SBO without transition point. Surgery consulted in ED, no acute surgical intervention. CT thoracic and lumbar spine showing degenerative disc disease T6-T7 through L4-L5 and mild disc bulges at L2-L3 through L4-L5 that flatten the ventral thecal sac and narrowing of the bilateral neural foramina.    Patient was admitted to the MICU for further care.     INTERVAL HPI/OVERNIGHT EVENTS:   Patient was extubated 12/4/22. Video EEG with no identified seizure activity but did demonstrate cognitive slowing. Follows Dr. Eula POLLARD neurology who has seen him in the hospital. Patient AOx2 (person, place) and following commands. States he fell at home, but unable to give further information. Patient off of pressors 12/4. Patient transferred out of MICU to floors 12/4 pm. 12/5 am RRT called for seizure like activity with urinary incontinence, tongue biting, and right > left posturing (R arm and R leg kicking), patient s/p ativan 2mg x3. patient with gurgling when he was laid down flat. Anesthesia called to RRT for intubation. Patient will be transferred back to MICU.     Vital Signs Last 24 Hrs  T(C): 36.6 (04 Dec 2022 22:55), Max: 37 (04 Dec 2022 20:00)  T(F): 97.8 (04 Dec 2022 22:55), Max: 98.6 (04 Dec 2022 20:00)  HR: 66 (04 Dec 2022 23:10) (48 - 88)  BP: 145/87 (04 Dec 2022 23:10) (84/53 - 145/87)  BP(mean): 91 (04 Dec 2022 19:00) (63 - 104)  RR: 17 (04 Dec 2022 23:10) (7 - 21)  SpO2: 97% (04 Dec 2022 23:10) (96% - 100%)    O2 Parameters below as of 04 Dec 2022 23:10  Patient On (Oxygen Delivery Method): room air    Mode: standby    12-03 @ 07:01  -  12-04 @ 07:00  --------------------------------------------------------  IN: 7872 mL / OUT: 4590 mL / NET: 3282 mL    12-04 @ 07:01 - 12-05 @ 03:05  --------------------------------------------------------  IN: 3476 mL / OUT: 3605 mL / NET: -129 mL    HOSPITAL MEDICATIONS:  MEDICATIONS  (STANDING):  acetaminophen   IVPB .. 1000 milliGRAM(s) IV Intermittent once  aspirin enteric coated 81 milliGRAM(s) Oral daily  chlorhexidine 4% Liquid 1 Application(s) Topical <User Schedule>  diphtheria/tetanus/pertussis (acellular) Vaccine (Adacel) 0.5 milliLiter(s) IntraMuscular once  furosemide   Injectable 40 milliGRAM(s) IV Push every 8 hours  heparin  Infusion. 1300 Unit(s)/Hr (13 mL/Hr) IV Continuous <Continuous>  lactated ringers. 1000 milliLiter(s) (250 mL/Hr) IV Continuous <Continuous>  LORazepam   Injectable 2 milliGRAM(s) IV Push once  LORazepam   Injectable 2 milliGRAM(s) IV Push once  LORazepam   Injectable 2 milliGRAM(s) IV Push once    MEDICATIONS  (PRN):  aluminum hydroxide/magnesium hydroxide/simethicone Suspension 30 milliLiter(s) Oral every 4 hours PRN Dyspepsia  heparin   Injectable 8000 Unit(s) IV Push every 6 hours PRN For aPTT less than 40  heparin   Injectable 4000 Unit(s) IV Push every 6 hours PRN For aPTT between 40 - 57  melatonin 3 milliGRAM(s) Oral at bedtime PRN Insomnia  ondansetron Injectable 4 milliGRAM(s) IV Push every 8 hours PRN Nausea and/or Vomiting    PHYSICAL EXAM:  GENERAL: NAD, well-groomed, well-developed  HEAD:  Atraumatic, Normocephalic  EYES: EOMI, PERRLA, conjunctiva and sclera clear  ENMT:  Moist mucous membranes  NECK: Supple, No JVD  CHEST/LUNG: Clear to auscultation bilaterally; No rales, rhonchi, wheezing, or rubs  HEART: Regular rate and rhythm; No mrg  ABDOMEN: Soft, Nontender, Nondistended; Bowel sounds present  NEURO: R arm posturing, R leg kicking, and itnermittent left sided posturing   EXTREMITIES: No LE edema, no calf tenderness  SKIN: Multiple bruising and ecchymosis present bilaterally on chest, UE, LE, thighs, etc as noted on admission    Consultant(s) Notes Reviewed:  [x ] YES  [ ] NO  Care Discussed with Consultants/Other Providers [ x] YES  [ ] NO    LABS:                        11.6   16.38 )-----------( 123      ( 04 Dec 2022 00:15 )             34.7     Hgb Trend: 11.6<--, 12.2<--, 11.4<--, 13.9<--, 16.7<--  12-04    137  |  99  |  13  ----------------------------<  140<H>  3.7   |  30  |  0.91    Ca    7.9<L>      04 Dec 2022 12:50  Phos  2.9     12-04  Mg     1.80     12-04    TPro  5.7<L>  /  Alb  2.7<L>  /  TBili  0.4  /  DBili  x   /  AST  233<H>  /  ALT  84<H>  /  AlkPhos  56  12-04    Creatinine Trend: 0.91<--, 0.99<--, 1.09<--, 1.06<--, 1.41<--, 1.49<--  PTT - ( 04 Dec 2022 05:00 )  PTT:75.6 sec    Arterial Blood Gas:  12-03 @ 13:04  7.38/41/170/24/99.5/-0.8  ABG lactate: --  Arterial Blood Gas:  12-03 @ 05:40  7.31/44/147/22/99.2/-4.1  ABG lactate: --      RADIOLOGY & ADDITIONAL TESTS:  EEG Summary / Classification:  Abnormal EEG   - Moderate to severe generalized slowing  EEG Impression / Clinical Correlate:  Abnormal EEG study.  Moderate to severe nonspecific diffuse or multifocal cerebral dysfunction.     ASSESSMENT & PLAN:	  52-year-old male with a PMHx significant for TIAs (2011, 2013), CVAs x3 (02/2022 s/p tPA, 8/3/2022 s/p tPA, 11/5/2022 with residual expressive aphasia) on Eliquis, and HLD BIBEMS after being found unresponsive at home on the floor, last known well 12/1 at around noon. C-collar was placed. Patient was intubated in the ED for airway protection. CTH with no acute findings, vEEG with no identified seizures. Patient was weaned off pressors, extubated, and transitioned to floors 12/4/22. 12/5 am RRT called for seizure like activity with urinary incontinence, tongue biting, and R>L posturing, patient s/p ativan 2mg x3. Anesthesia called to RRT for intubation. MICU accepting patient for seizure like activity requiring intubation.     NEURO  #AMS, Seizure like activity  - Patient found down and unresponsive at home on 12/2, last known well 12/1 at around noon. Intubated in ED 12/2, extubated in MICU 12/4  - RRT 12/5: seizure like acting with urinary incontinence, convulsions, and tongue biting; s/p ativan 2mg x3 with pauses in seizure activity following each; intubated for airway protection with rocuronium   - CTH and CT cervical spine 12/2 negative for any acute pathologies. Tox screen on admission negative  - Video EEG 12/3-12/4 without any seizure like activity, moderate to severe nonspecific diffuse or multifocal cerebral dysfunction  - c/w propofol (started 12/5 during RRT)  - f/u repeat CT head  - f/u MR brain for stroke evaluation, patient has loop recorder but MRI compatible per Radiology   - f/u neuro recs re AED  - f/u prolactin  - Likely will need repeat EEG    #CVAs/TIAs  - Hx of TIAs in 2011 and 2013, CVAs x3 in 02/22, 8/22, and 11/22 with residual expressive aphasia   - on Eliquis and Aspirin at home   - c/w ASA  - c/w heparin gtt  - Restart Statin when LFTs and CPK improve per neurology  - Being worked up for Mixed Connective Tissue Disease OP- f/u p-ANCA, c-ANCA  - f/u Neuro recs    #Thecal sac flattening   - CT thoracic and lumbar spine showing degenerative disc disease T6-T7 through L4-L5 and mild disc bulges at L2-L3 through L4-L5 that flatten the ventral thecal sac and narrowing of the bilateral neural foramina  - Will Monitor for now, will consider neuro/neurosurg evaluation in the future    PULM  #Intubated  - Intubated 12/2 in ED for airway protection, extubated 12/4 in MICU  - Intubated 12/5 am for airway protection iso seizure like activity with gurgling noises  - trend ABGs  - f/u CXR    CARDIAC  #HLD  - atorvastatin on hold due to elevated CK and LFTs  - please restart when possible for secondary stroke prevention    #?ASD/PFO  - Patient reportedly found to have a PFO in February 2022 during a stroke workup at [place]. Patient presented for a PFO closure in November 2022. Notably, no PFO was found at the time and no intervention was performed by Dr. Lynn.    - TTE 11/5/22 EF 57% and grossly normal LV function    RENAL  - no active issues    #Rhabdomyolysis  - CK 8720 on admission, peaked at 15k, now downtrending  - DDx: trauma v hyperthermia (T 105.4 F) v sepsis   - Will trend CKs- still above 10,000  - c/w standing Lasix 40 IVP Q8H for now for goal  cc/hr  - c/w aggressive IVFs  - BMP Q12H with Mg, Phos    GI   #Partial SBO  - CT chest, abdomen, and pelvis w/ contrast concerning for possible partial SBO without transition point.  - Surgery consulted, no acute intervention at this time  - NPO for now with NGT to low wall suction  - Monitor for BMs, monitor for abdominal distension    HEME/ONC  #Leukocytosis  - Concern for septic process  - Trend CBCs    ID  #Concern for sepsis  - Patient initially presented with AMS, T 105.4, tachycardic, and tachypneic   - UA negative  - s/p vanco and zosyn x1 in ED 12/2  - s/p ceftriaxone 2g BID 12/3-12/4  - s/p vanco 12/3-12/4  - BCx and UC 12/2 NGTD  - monitor off ABx for now    ENDO  - No acute issues    DVT  - Hep gtt MICU ACCEPT NOTE    Patient is a 52y old  Male who presents with a chief complaint of Unresponsive (04 Dec 2022 07:55)      HPI: 52-year-old male with a PMHx significant for TIAs (2011, 2013), CVAs x3 (02/2022 s/p tPA, 8/3/2022 s/p tPA, 11/5/2022 with residual expressive aphasia) on Eliquis, and HLD BIBEMS after being found unresponsive at home. Per patient's 2 friends in the ED, patient was last spoken to around noon on 12/1. No one had heard from him or seen him since yesterday, was not responding to calls this am, so they went to check on him today and they found him unresponsive at home with head on floor turned to the side and one leg was on the bed. Patient was snoring per his friends. Friends called EMS. EMS states he was grunting but minimally responsive. Patient arrived to the ER covered in dried blood in his mouth with multiple ecchymosis on the left side of his face, neck,, chest, arm, thigh, LE. Patient was obtunded. Friend denies hx of ETOH or drug use. Of note, patient had a questionable ASD/PFO with possible closure on 11/22/22; per patient's friends no PFO was found and no closure was performed by Dr. Lynn.  Per outpatient records, patient previously on testosterone transdermal solution which was stopped on 11/15 by his urologist. Per patient's friends at bedside, patient was recently prescribed a mail-order testosterone replacement, unsure of the name and unsure if he received and started the medication. At baseline, patient is reported to be AOx3, able to ambulate without assistance, and takes care of ADLs without assistance.     On arrival in the ED, patient with rectal temp 105.4 F, tachy to 130s, hypertensive to 180/110, and tachypneic to 30. C-collar was placed. Patient was intubated in the ED for airway protection with etomidate and succinylcholine. Patient sedated with propofol and fentanyl. Started on vanco and zosyn. 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 CT cervical spine and maxillofacial scans negative. CT chest, abdomen, and pelvis w/ contrast concerning for possible partial SBO without transition point. Surgery consulted in ED, no acute surgical intervention. CT thoracic and lumbar spine showing degenerative disc disease T6-T7 through L4-L5 and mild disc bulges at L2-L3 through L4-L5 that flatten the ventral thecal sac and narrowing of the bilateral neural foramina.    Patient was admitted to the MICU for further care.     INTERVAL HPI/OVERNIGHT EVENTS:   Patient was extubated 12/4/22. Video EEG with no identified seizure activity but did demonstrate cognitive slowing. Follows Dr. Eula POLLARD neurology who has seen him in the hospital. Patient AOx2 (person, place) and following commands. States he fell at home, but unable to give further information. Patient off of pressors 12/4. Patient transferred out of MICU to floors 12/4 pm. 12/5 am RRT called for seizure like activity with urinary incontinence, tongue biting, and right > left posturing (R arm and R leg kicking), patient s/p ativan 2mg x3. patient with gurgling when he was laid down flat. Anesthesia called to RRT for intubation. Patient will be transferred back to MICU.     Vital Signs Last 24 Hrs  T(C): 36.6 (04 Dec 2022 22:55), Max: 37 (04 Dec 2022 20:00)  T(F): 97.8 (04 Dec 2022 22:55), Max: 98.6 (04 Dec 2022 20:00)  HR: 66 (04 Dec 2022 23:10) (48 - 88)  BP: 145/87 (04 Dec 2022 23:10) (84/53 - 145/87)  BP(mean): 91 (04 Dec 2022 19:00) (63 - 104)  RR: 17 (04 Dec 2022 23:10) (7 - 21)  SpO2: 97% (04 Dec 2022 23:10) (96% - 100%)    O2 Parameters below as of 04 Dec 2022 23:10  Patient On (Oxygen Delivery Method): room air    Mode: standby    12-03 @ 07:01  -  12-04 @ 07:00  --------------------------------------------------------  IN: 7872 mL / OUT: 4590 mL / NET: 3282 mL    12-04 @ 07:01 - 12-05 @ 03:05  --------------------------------------------------------  IN: 3476 mL / OUT: 3605 mL / NET: -129 mL    HOSPITAL MEDICATIONS:  MEDICATIONS  (STANDING):  acetaminophen   IVPB .. 1000 milliGRAM(s) IV Intermittent once  aspirin enteric coated 81 milliGRAM(s) Oral daily  chlorhexidine 4% Liquid 1 Application(s) Topical <User Schedule>  diphtheria/tetanus/pertussis (acellular) Vaccine (Adacel) 0.5 milliLiter(s) IntraMuscular once  furosemide   Injectable 40 milliGRAM(s) IV Push every 8 hours  heparin  Infusion. 1300 Unit(s)/Hr (13 mL/Hr) IV Continuous <Continuous>  lactated ringers. 1000 milliLiter(s) (250 mL/Hr) IV Continuous <Continuous>  LORazepam   Injectable 2 milliGRAM(s) IV Push once  LORazepam   Injectable 2 milliGRAM(s) IV Push once  LORazepam   Injectable 2 milliGRAM(s) IV Push once    MEDICATIONS  (PRN):  aluminum hydroxide/magnesium hydroxide/simethicone Suspension 30 milliLiter(s) Oral every 4 hours PRN Dyspepsia  heparin   Injectable 8000 Unit(s) IV Push every 6 hours PRN For aPTT less than 40  heparin   Injectable 4000 Unit(s) IV Push every 6 hours PRN For aPTT between 40 - 57  melatonin 3 milliGRAM(s) Oral at bedtime PRN Insomnia  ondansetron Injectable 4 milliGRAM(s) IV Push every 8 hours PRN Nausea and/or Vomiting    PHYSICAL EXAM:  GENERAL: NAD, well-groomed, well-developed  HEAD:  Atraumatic, Normocephalic  EYES: EOMI, PERRLA, conjunctiva and sclera clear  ENMT:  Moist mucous membranes  NECK: Supple, No JVD  CHEST/LUNG: Clear to auscultation bilaterally; No rales, rhonchi, wheezing, or rubs  HEART: Regular rate and rhythm; No mrg  ABDOMEN: Soft, Nontender, Nondistended; Bowel sounds present  NEURO: R arm posturing, R leg kicking, and itnermittent left sided posturing   EXTREMITIES: No LE edema, no calf tenderness  SKIN: Multiple bruising and ecchymosis present bilaterally on chest, UE, LE, thighs, etc as noted on admission    Consultant(s) Notes Reviewed:  [x ] YES  [ ] NO  Care Discussed with Consultants/Other Providers [ x] YES  [ ] NO    LABS:                        11.6   16.38 )-----------( 123      ( 04 Dec 2022 00:15 )             34.7     Hgb Trend: 11.6<--, 12.2<--, 11.4<--, 13.9<--, 16.7<--  12-04    137  |  99  |  13  ----------------------------<  140<H>  3.7   |  30  |  0.91    Ca    7.9<L>      04 Dec 2022 12:50  Phos  2.9     12-04  Mg     1.80     12-04    TPro  5.7<L>  /  Alb  2.7<L>  /  TBili  0.4  /  DBili  x   /  AST  233<H>  /  ALT  84<H>  /  AlkPhos  56  12-04    Creatinine Trend: 0.91<--, 0.99<--, 1.09<--, 1.06<--, 1.41<--, 1.49<--  PTT - ( 04 Dec 2022 05:00 )  PTT:75.6 sec    Arterial Blood Gas:  12-03 @ 13:04  7.38/41/170/24/99.5/-0.8  ABG lactate: --  Arterial Blood Gas:  12-03 @ 05:40  7.31/44/147/22/99.2/-4.1  ABG lactate: --      RADIOLOGY & ADDITIONAL TESTS:  EEG Summary / Classification:  Abnormal EEG   - Moderate to severe generalized slowing  EEG Impression / Clinical Correlate:  Abnormal EEG study.  Moderate to severe nonspecific diffuse or multifocal cerebral dysfunction.     ASSESSMENT & PLAN:	  52-year-old male with a PMHx significant for TIAs (2011, 2013), CVAs x3 (02/2022 s/p tPA, 8/3/2022 s/p tPA, 11/5/2022 with residual expressive aphasia) on Eliquis, and HLD BIBEMS after being found unresponsive at home on the floor, last known well 12/1 at around noon. C-collar was placed. Patient was intubated in the ED for airway protection. CTH with no acute findings, vEEG with no identified seizures. Patient was weaned off pressors, extubated, and transitioned to floors 12/4/22. 12/5 am RRT called for seizure like activity with urinary incontinence, tongue biting, and R>L posturing, patient s/p ativan 2mg x3. Anesthesia called to RRT for intubation. MICU accepting patient for seizure like activity requiring intubation.     NEURO  #AMS, Seizure like activity  - Patient found down and unresponsive at home on 12/2, last known well 12/1 at around noon. Intubated in ED 12/2, extubated in MICU 12/4  - RRT 12/5: seizure like acting with urinary incontinence, convulsions, and tongue biting; s/p ativan 2mg x3 with pauses in seizure activity following each; intubated for airway protection with rocuronium   - CTH and CT cervical spine 12/2 negative for any acute pathologies. Tox screen on admission negative  - Video EEG 12/3-12/4 without any seizure like activity, moderate to severe nonspecific diffuse or multifocal cerebral dysfunction  - c/w propofol (started 12/5 during RRT)  - f/u repeat CT head  - f/u MR brain for stroke evaluation, patient has loop recorder but MRI compatible per Radiology   - f/u neuro recs re AED  - f/u prolactin  - Likely will need repeat EEG    #CVAs/TIAs  - Hx of TIAs in 2011 and 2013, CVAs x3 in 02/22, 8/22, and 11/22 with residual expressive aphasia   - on Eliquis and Aspirin at home   - c/w ASA  - c/w heparin gtt  - Restart Statin when LFTs and CPK improve per neurology  - Being worked up for Mixed Connective Tissue Disease OP- f/u p-ANCA, c-ANCA  - f/u Neuro recs    #Thecal sac flattening   - CT thoracic and lumbar spine showing degenerative disc disease T6-T7 through L4-L5 and mild disc bulges at L2-L3 through L4-L5 that flatten the ventral thecal sac and narrowing of the bilateral neural foramina  - Will Monitor for now, will consider neuro/neurosurg evaluation in the future    PULM  #Intubated  - Intubated 12/2 in ED for airway protection, extubated 12/4 in MICU  - Intubated 12/5 am for airway protection iso seizure like activity with gurgling noises  - trend ABGs  - f/u CXR    CARDIAC  #HLD  - atorvastatin on hold due to elevated CK and LFTs  - please restart when possible for secondary stroke prevention    #?ASD/PFO  - Patient reportedly found to have a PFO in February 2022 during a stroke workup at [place]. Patient presented for a PFO closure in November 2022. Notably, no PFO was found at the time and no intervention was performed by Dr. Lynn.    - TTE 11/5/22 EF 57% and grossly normal LV function    RENAL  #Rhabdomyolysis  - CK 8720 on admission, peaked at 15k, now downtrending  - DDx: trauma v hyperthermia (T 105.4 F) v sepsis   - previously on Lasix 40 IVP Q8H, will hold for now and re-evaluate  - c/w aggressive IVFs  - Will trend CKs- still above 10,000  - BMP Q12H with Mg, Phos    GI   #Partial SBO  - CT chest, abdomen, and pelvis w/ contrast concerning for possible partial SBO without transition point.  - Surgery consulted, no acute intervention at this time  - NPO for now with NGT to low wall suction  - Monitor for BMs, monitor for abdominal distension    HEME/ONC  #Leukocytosis  - Concern for septic process  - Trend CBCs    ID  #Concern for sepsis  - Patient initially presented with AMS, T 105.4, tachycardic, and tachypneic   - UA negative  - s/p vanco and zosyn x1 in ED 12/2  - s/p ceftriaxone 2g BID 12/3-12/4  - s/p vanco 12/3-12/4  - BCx and UC 12/2 NGTD  - monitor off ABx for now    ENDO  - No acute issues    DVT  - Hep gtt MICU ACCEPT NOTE    Patient is a 52y old  Male who presents with a chief complaint of Unresponsive (04 Dec 2022 07:55)      HPI: 52-year-old male with a PMHx significant for TIAs (2011, 2013), CVAs x3 (02/2022 s/p tPA, 8/3/2022 s/p tPA, 11/5/2022 with residual expressive aphasia) on Eliquis, and HLD BIBEMS after being found unresponsive at home. Per patient's 2 friends in the ED, patient was last spoken to around noon on 12/1. No one had heard from him or seen him since yesterday, was not responding to calls this am, so they went to check on him today and they found him unresponsive at home with head on floor turned to the side and one leg was on the bed. Patient was snoring per his friends. Friends called EMS. EMS states he was grunting but minimally responsive. Patient arrived to the ER covered in dried blood in his mouth with multiple ecchymosis on the left side of his face, neck,, chest, arm, thigh, LE. Patient was obtunded. Friend denies hx of ETOH or drug use. Of note, patient had a questionable ASD/PFO with possible closure on 11/22/22; per patient's friends no PFO was found and no closure was performed by Dr. Lynn.  Per outpatient records, patient previously on testosterone transdermal solution which was stopped on 11/15 by his urologist. Per patient's friends at bedside, patient was recently prescribed a mail-order testosterone replacement, unsure of the name and unsure if he received and started the medication. At baseline, patient is reported to be AOx3, able to ambulate without assistance, and takes care of ADLs without assistance.     On arrival in the ED, patient with rectal temp 105.4 F, tachy to 130s, hypertensive to 180/110, and tachypneic to 30. C-collar was placed. Patient was intubated in the ED for airway protection with etomidate and succinylcholine. Patient sedated with propofol and fentanyl. Started on vanco and zosyn. 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 CT cervical spine and maxillofacial scans negative. CT chest, abdomen, and pelvis w/ contrast concerning for possible partial SBO without transition point. Surgery consulted in ED, no acute surgical intervention. CT thoracic and lumbar spine showing degenerative disc disease T6-T7 through L4-L5 and mild disc bulges at L2-L3 through L4-L5 that flatten the ventral thecal sac and narrowing of the bilateral neural foramina.    Patient was admitted to the MICU for further care.     INTERVAL HPI/OVERNIGHT EVENTS:   Patient was extubated 12/4/22. Video EEG with no identified seizure activity but did demonstrate cognitive slowing. Follows Dr. Eula POLLARD neurology who has seen him in the hospital. Patient AOx2 (person, place) and following commands. States he fell at home, but unable to give further information. Patient off of pressors 12/4. Patient transferred out of MICU to floors 12/4 pm. 12/5 am RRT called for seizure like activity with urinary incontinence, tongue biting, and right > left posturing (R arm and R leg kicking), patient s/p ativan 2mg x3. patient with gurgling when he was laid down flat. Anesthesia called to RRT for intubation. Patient will be transferred back to MICU.     Vital Signs Last 24 Hrs  T(C): 36.6 (04 Dec 2022 22:55), Max: 37 (04 Dec 2022 20:00)  T(F): 97.8 (04 Dec 2022 22:55), Max: 98.6 (04 Dec 2022 20:00)  HR: 66 (04 Dec 2022 23:10) (48 - 88)  BP: 145/87 (04 Dec 2022 23:10) (84/53 - 145/87)  BP(mean): 91 (04 Dec 2022 19:00) (63 - 104)  RR: 17 (04 Dec 2022 23:10) (7 - 21)  SpO2: 97% (04 Dec 2022 23:10) (96% - 100%)    O2 Parameters below as of 04 Dec 2022 23:10  Patient On (Oxygen Delivery Method): room air    Mode: standby    12-03 @ 07:01  -  12-04 @ 07:00  --------------------------------------------------------  IN: 7872 mL / OUT: 4590 mL / NET: 3282 mL    12-04 @ 07:01 - 12-05 @ 03:05  --------------------------------------------------------  IN: 3476 mL / OUT: 3605 mL / NET: -129 mL    HOSPITAL MEDICATIONS:  MEDICATIONS  (STANDING):  acetaminophen   IVPB .. 1000 milliGRAM(s) IV Intermittent once  aspirin enteric coated 81 milliGRAM(s) Oral daily  chlorhexidine 4% Liquid 1 Application(s) Topical <User Schedule>  diphtheria/tetanus/pertussis (acellular) Vaccine (Adacel) 0.5 milliLiter(s) IntraMuscular once  furosemide   Injectable 40 milliGRAM(s) IV Push every 8 hours  heparin  Infusion. 1300 Unit(s)/Hr (13 mL/Hr) IV Continuous <Continuous>  lactated ringers. 1000 milliLiter(s) (250 mL/Hr) IV Continuous <Continuous>  LORazepam   Injectable 2 milliGRAM(s) IV Push once  LORazepam   Injectable 2 milliGRAM(s) IV Push once  LORazepam   Injectable 2 milliGRAM(s) IV Push once    MEDICATIONS  (PRN):  aluminum hydroxide/magnesium hydroxide/simethicone Suspension 30 milliLiter(s) Oral every 4 hours PRN Dyspepsia  heparin   Injectable 8000 Unit(s) IV Push every 6 hours PRN For aPTT less than 40  heparin   Injectable 4000 Unit(s) IV Push every 6 hours PRN For aPTT between 40 - 57  melatonin 3 milliGRAM(s) Oral at bedtime PRN Insomnia  ondansetron Injectable 4 milliGRAM(s) IV Push every 8 hours PRN Nausea and/or Vomiting    PHYSICAL EXAM:  GENERAL: NAD, well-groomed, well-developed  HEAD:  Atraumatic, Normocephalic  EYES: EOMI, PERRLA, conjunctiva and sclera clear  ENMT:  Moist mucous membranes  NECK: Supple, No JVD  CHEST/LUNG: Clear to auscultation bilaterally; No rales, rhonchi, wheezing, or rubs  HEART: Regular rate and rhythm; No mrg  ABDOMEN: Soft, Nontender, Nondistended; Bowel sounds present  NEURO: R arm posturing, R leg kicking, and itnermittent left sided posturing   EXTREMITIES: No LE edema, no calf tenderness  SKIN: Multiple bruising and ecchymosis present bilaterally on chest, UE, LE, thighs, etc as noted on admission    Consultant(s) Notes Reviewed:  [x ] YES  [ ] NO  Care Discussed with Consultants/Other Providers [ x] YES  [ ] NO    LABS:                        11.6   16.38 )-----------( 123      ( 04 Dec 2022 00:15 )             34.7     Hgb Trend: 11.6<--, 12.2<--, 11.4<--, 13.9<--, 16.7<--  12-04    137  |  99  |  13  ----------------------------<  140<H>  3.7   |  30  |  0.91    Ca    7.9<L>      04 Dec 2022 12:50  Phos  2.9     12-04  Mg     1.80     12-04    TPro  5.7<L>  /  Alb  2.7<L>  /  TBili  0.4  /  DBili  x   /  AST  233<H>  /  ALT  84<H>  /  AlkPhos  56  12-04    Creatinine Trend: 0.91<--, 0.99<--, 1.09<--, 1.06<--, 1.41<--, 1.49<--  PTT - ( 04 Dec 2022 05:00 )  PTT:75.6 sec    Arterial Blood Gas:  12-03 @ 13:04  7.38/41/170/24/99.5/-0.8  ABG lactate: --  Arterial Blood Gas:  12-03 @ 05:40  7.31/44/147/22/99.2/-4.1  ABG lactate: --      RADIOLOGY & ADDITIONAL TESTS:  EEG Summary / Classification:  Abnormal EEG   - Moderate to severe generalized slowing  EEG Impression / Clinical Correlate:  Abnormal EEG study.  Moderate to severe nonspecific diffuse or multifocal cerebral dysfunction.     ASSESSMENT & PLAN:	  52-year-old male with a PMHx significant for TIAs (2011, 2013), CVAs x3 (02/2022 s/p tPA, 8/3/2022 s/p tPA, 11/5/2022 with residual expressive aphasia) on Eliquis, and HLD BIBEMS after being found unresponsive at home on the floor, last known well 12/1 at around noon. C-collar was placed. Patient was intubated in the ED for airway protection. CTH with no acute findings, vEEG with no identified seizures. Patient was weaned off pressors, extubated, and transitioned to floors 12/4/22. 12/5 am RRT called for seizure like activity with urinary incontinence, tongue biting, and R>L posturing, patient s/p ativan 2mg x3. Anesthesia called to RRT for intubation. MICU accepting patient for seizure like activity requiring intubation.     NEURO  #AMS, Seizure like activity  - Patient found down and unresponsive at home on 12/2, last known well 12/1 at around noon. Intubated in ED 12/2, extubated in MICU 12/4  - RRT 12/5: seizure like acting with urinary incontinence, convulsions, and tongue biting; s/p ativan 2mg x3 with pauses in seizure activity following each; intubated for airway protection with rocuronium   - CTH and CT cervical spine 12/2 negative for any acute pathologies. Tox screen on admission negative  - Video EEG 12/3-12/4 without any seizure like activity, moderate to severe nonspecific diffuse or multifocal cerebral dysfunction  - c/w propofol (started 12/5 during RRT)  - per neuro: keppra load with 4500mg and followed by 1500mg TID maintenance   - f/u repeat CT head  - f/u MR brain for stroke evaluation, patient has loop recorder but MRI compatible per Radiology   - f/u neuro recs   - f/u prolactin  - Likely will need repeat EEG    #CVAs/TIAs  - Hx of TIAs in 2011 and 2013, CVAs x3 in 02/22, 8/22, and 11/22 with residual expressive aphasia   - on Eliquis and Aspirin at home   - c/w ASA  - c/w heparin gtt  - Restart Statin when LFTs and CPK improve per neurology  - Being worked up for Mixed Connective Tissue Disease OP- f/u p-ANCA, c-ANCA  - f/u Neuro recs    #Thecal sac flattening   - CT thoracic and lumbar spine showing degenerative disc disease T6-T7 through L4-L5 and mild disc bulges at L2-L3 through L4-L5 that flatten the ventral thecal sac and narrowing of the bilateral neural foramina  - Will Monitor for now, will consider neuro/neurosurg evaluation in the future    PULM  #Intubated  - Intubated 12/2 in ED for airway protection, extubated 12/4 in MICU  - Intubated 12/5 am for airway protection iso seizure like activity with gurgling noises  - trend ABGs  - f/u CXR    CARDIAC  #HLD  - atorvastatin on hold due to elevated CK and LFTs  - please restart when possible for secondary stroke prevention    #?ASD/PFO  - Patient reportedly found to have a PFO in February 2022 during a stroke workup at [place]. Patient presented for a PFO closure in November 2022. Notably, no PFO was found at the time and no intervention was performed by Dr. Lynn.    - TTE 11/5/22 EF 57% and grossly normal LV function    RENAL  #Rhabdomyolysis  - CK 8720 on admission, peaked at 15k, now downtrending  - DDx: trauma v hyperthermia (T 105.4 F) v sepsis   - previously on Lasix 40 IVP Q8H, will hold for now and re-evaluate  - c/w aggressive IVFs  - Will trend CKs- still above 10,000  - BMP Q12H with Mg, Phos    GI   #Partial SBO  - CT chest, abdomen, and pelvis w/ contrast concerning for possible partial SBO without transition point.  - Surgery consulted, no acute intervention at this time  - NPO for now with NGT to low wall suction  - Monitor for BMs, monitor for abdominal distension    HEME/ONC  #Leukocytosis  - Concern for septic process  - Trend CBCs    ID  #Concern for sepsis  - Patient initially presented with AMS, T 105.4, tachycardic, and tachypneic   - UA negative  - s/p vanco and zosyn x1 in ED 12/2  - s/p ceftriaxone 2g BID 12/3-12/4  - s/p vanco 12/3-12/4  - BCx and UC 12/2 NGTD  - monitor off ABx for now    ENDO  - No acute issues    DVT  - Hep gtt MICU ACCEPT NOTE    Patient is a 52y old  Male who presents with a chief complaint of Unresponsive (04 Dec 2022 07:55)      HPI: 52-year-old male with a PMHx significant for TIAs (2011, 2013), CVAs x3 (02/2022 s/p tPA, 8/3/2022 s/p tPA, 11/5/2022 with residual expressive aphasia) on Eliquis, and HLD BIBEMS after being found unresponsive at home. Per patient's 2 friends in the ED, patient was last spoken to around noon on 12/1. No one had heard from him or seen him since yesterday, was not responding to calls this am, so they went to check on him today and they found him unresponsive at home with head on floor turned to the side and one leg was on the bed. Patient was snoring per his friends. Friends called EMS. EMS states he was grunting but minimally responsive. Patient arrived to the ER covered in dried blood in his mouth with multiple ecchymosis on the left side of his face, neck,, chest, arm, thigh, LE. Patient was obtunded. Friend denies hx of ETOH or drug use. Of note, patient had a questionable ASD/PFO with possible closure on 11/22/22; per patient's friends no PFO was found and no closure was performed by Dr. Lynn.  Per outpatient records, patient previously on testosterone transdermal solution which was stopped on 11/15 by his urologist. Per patient's friends at bedside, patient was recently prescribed a mail-order testosterone replacement, unsure of the name and unsure if he received and started the medication. At baseline, patient is reported to be AOx3, able to ambulate without assistance, and takes care of ADLs without assistance.     On arrival in the ED, patient with rectal temp 105.4 F, tachy to 130s, hypertensive to 180/110, and tachypneic to 30. C-collar was placed. Patient was intubated in the ED for airway protection with etomidate and succinylcholine. Patient sedated with propofol and fentanyl. Started on vanco and zosyn. 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 CT cervical spine and maxillofacial scans negative. CT chest, abdomen, and pelvis w/ contrast concerning for possible partial SBO without transition point. Surgery consulted in ED, no acute surgical intervention. CT thoracic and lumbar spine showing degenerative disc disease T6-T7 through L4-L5 and mild disc bulges at L2-L3 through L4-L5 that flatten the ventral thecal sac and narrowing of the bilateral neural foramina.    Patient was admitted to the MICU for further care.     INTERVAL HPI/OVERNIGHT EVENTS:   Patient was extubated 12/4/22. Video EEG with no identified seizure activity but did demonstrate cognitive slowing. Follows Dr. Eula POLLARD neurology who has seen him in the hospital. Patient AOx2 (person, place) and following commands. States he fell at home, but unable to give further information. Patient off of pressors 12/4. Patient transferred out of MICU to floors 12/4 pm. 12/5 am RRT called for seizure like activity with urinary incontinence, tongue biting, and right > left posturing (R arm and R leg kicking), patient s/p ativan 2mg x3. patient with gurgling when he was laid down flat. Anesthesia called to RRT for intubation. Patient will be transferred back to MICU.     Vital Signs Last 24 Hrs  T(C): 36.6 (04 Dec 2022 22:55), Max: 37 (04 Dec 2022 20:00)  T(F): 97.8 (04 Dec 2022 22:55), Max: 98.6 (04 Dec 2022 20:00)  HR: 66 (04 Dec 2022 23:10) (48 - 88)  BP: 145/87 (04 Dec 2022 23:10) (84/53 - 145/87)  BP(mean): 91 (04 Dec 2022 19:00) (63 - 104)  RR: 17 (04 Dec 2022 23:10) (7 - 21)  SpO2: 97% (04 Dec 2022 23:10) (96% - 100%)    O2 Parameters below as of 04 Dec 2022 23:10  Patient On (Oxygen Delivery Method): room air    Mode: standby    12-03 @ 07:01  -  12-04 @ 07:00  --------------------------------------------------------  IN: 7872 mL / OUT: 4590 mL / NET: 3282 mL    12-04 @ 07:01 - 12-05 @ 03:05  --------------------------------------------------------  IN: 3476 mL / OUT: 3605 mL / NET: -129 mL    HOSPITAL MEDICATIONS:  MEDICATIONS  (STANDING):  acetaminophen   IVPB .. 1000 milliGRAM(s) IV Intermittent once  aspirin enteric coated 81 milliGRAM(s) Oral daily  chlorhexidine 4% Liquid 1 Application(s) Topical <User Schedule>  diphtheria/tetanus/pertussis (acellular) Vaccine (Adacel) 0.5 milliLiter(s) IntraMuscular once  furosemide   Injectable 40 milliGRAM(s) IV Push every 8 hours  heparin  Infusion. 1300 Unit(s)/Hr (13 mL/Hr) IV Continuous <Continuous>  lactated ringers. 1000 milliLiter(s) (250 mL/Hr) IV Continuous <Continuous>  LORazepam   Injectable 2 milliGRAM(s) IV Push once  LORazepam   Injectable 2 milliGRAM(s) IV Push once  LORazepam   Injectable 2 milliGRAM(s) IV Push once    MEDICATIONS  (PRN):  aluminum hydroxide/magnesium hydroxide/simethicone Suspension 30 milliLiter(s) Oral every 4 hours PRN Dyspepsia  heparin   Injectable 8000 Unit(s) IV Push every 6 hours PRN For aPTT less than 40  heparin   Injectable 4000 Unit(s) IV Push every 6 hours PRN For aPTT between 40 - 57  melatonin 3 milliGRAM(s) Oral at bedtime PRN Insomnia  ondansetron Injectable 4 milliGRAM(s) IV Push every 8 hours PRN Nausea and/or Vomiting    PHYSICAL EXAM:  GENERAL: NAD, well-groomed, well-developed  HEAD:  Atraumatic, Normocephalic  EYES: EOMI, PERRLA, conjunctiva and sclera clear  ENMT:  Moist mucous membranes  NECK: Supple, No JVD  CHEST/LUNG: Clear to auscultation bilaterally; No rales, rhonchi, wheezing, or rubs  HEART: Regular rate and rhythm; No mrg  ABDOMEN: Soft, Nontender, Nondistended; Bowel sounds present  NEURO: R arm posturing, R leg kicking, and itnermittent left sided posturing   EXTREMITIES: No LE edema, no calf tenderness  SKIN: Multiple bruising and ecchymosis present bilaterally on chest, UE, LE, thighs, etc as noted on admission    Consultant(s) Notes Reviewed:  [x ] YES  [ ] NO  Care Discussed with Consultants/Other Providers [ x] YES  [ ] NO    LABS:                        11.6   16.38 )-----------( 123      ( 04 Dec 2022 00:15 )             34.7     Hgb Trend: 11.6<--, 12.2<--, 11.4<--, 13.9<--, 16.7<--  12-04    137  |  99  |  13  ----------------------------<  140<H>  3.7   |  30  |  0.91    Ca    7.9<L>      04 Dec 2022 12:50  Phos  2.9     12-04  Mg     1.80     12-04    TPro  5.7<L>  /  Alb  2.7<L>  /  TBili  0.4  /  DBili  x   /  AST  233<H>  /  ALT  84<H>  /  AlkPhos  56  12-04    Creatinine Trend: 0.91<--, 0.99<--, 1.09<--, 1.06<--, 1.41<--, 1.49<--  PTT - ( 04 Dec 2022 05:00 )  PTT:75.6 sec    Arterial Blood Gas:  12-03 @ 13:04  7.38/41/170/24/99.5/-0.8  ABG lactate: --  Arterial Blood Gas:  12-03 @ 05:40  7.31/44/147/22/99.2/-4.1  ABG lactate: --      RADIOLOGY & ADDITIONAL TESTS:  EEG Summary / Classification:  Abnormal EEG   - Moderate to severe generalized slowing  EEG Impression / Clinical Correlate:  Abnormal EEG study.  Moderate to severe nonspecific diffuse or multifocal cerebral dysfunction.     ASSESSMENT & PLAN:	  52-year-old male with a PMHx significant for TIAs (2011, 2013), CVAs x3 (02/2022 s/p tPA, 8/3/2022 s/p tPA, 11/5/2022 with residual expressive aphasia) on Eliquis, and HLD BIBEMS after being found unresponsive at home on the floor, last known well 12/1 at around noon. C-collar was placed. Patient was intubated in the ED for airway protection. CTH with no acute findings, vEEG with no identified seizures. Patient was weaned off pressors, extubated, and transitioned to floors 12/4/22. 12/5 am RRT called for seizure like activity with urinary incontinence, tongue biting, and R>L posturing, patient s/p ativan 2mg x3. Anesthesia called to RRT for intubation. MICU accepting patient for seizure like activity requiring intubation.     NEURO  #AMS, Seizure like activity  - Patient found down and unresponsive at home on 12/2, last known well 12/1 at around noon. Intubated in ED 12/2, extubated in MICU 12/4  - RRT 12/5: seizure like acting with urinary incontinence, convulsions, and tongue biting; s/p ativan 2mg x3 with pauses in seizure activity following each; intubated for airway protection with rocuronium   - CTH and CT cervical spine 12/2 negative for any acute pathologies. Tox screen on admission negative  - Video EEG 12/3-12/4 without any seizure like activity, moderate to severe nonspecific diffuse or multifocal cerebral dysfunction  - c/w propofol (started 12/5 during RRT)  - per neuro: keppra load with 4500mg and followed by 1500mg TID maintenance   - f/u repeat CT head  - f/u MR brain for stroke evaluation, patient has loop recorder but MRI compatible per Radiology   - f/u neuro recs   - f/u prolactin  - Likely will need repeat EEG    #CVAs/TIAs  - Hx of TIAs in 2011 and 2013, CVAs x3 in 02/22, 8/22, and 11/22 with residual expressive aphasia   - on Eliquis and Aspirin at home   - c/w ASA  - c/w heparin gtt  - Restart Statin when LFTs and CPK improve per neurology  - Being worked up for Mixed Connective Tissue Disease OP- f/u p-ANCA, c-ANCA  - f/u Neuro recs    #Thecal sac flattening   - CT thoracic and lumbar spine showing degenerative disc disease T6-T7 through L4-L5 and mild disc bulges at L2-L3 through L4-L5 that flatten the ventral thecal sac and narrowing of the bilateral neural foramina  - Will Monitor for now, will consider neuro/neurosurg evaluation in the future    PULM  #Intubated  - Intubated 12/2 in ED for airway protection, extubated 12/4 in MICU  - Intubated 12/5 am for airway protection iso seizure like activity with gurgling noises  - trend ABGs  - f/u CXR    CARDIAC  #HLD  - atorvastatin on hold due to elevated CK and LFTs  - please restart when possible for secondary stroke prevention    #?ASD/PFO  - Patient reportedly found to have a PFO in February 2022 during a stroke workup at [place]. Patient presented for a PFO closure in November 2022. Notably, no PFO was found at the time and no intervention was performed by Dr. Lynn.    - TTE 11/5/22 EF 57% and grossly normal LV function    RENAL  #Rhabdomyolysis  - CK 8720 on admission, peaked at 15k, now downtrending  - DDx: trauma v hyperthermia (T 105.4 F) v sepsis   - previously on Lasix 40 IVP Q8H, will hold for now and re-evaluate  - c/w aggressive IVFs  - Will trend CKs- still above 10,000  - BMP Q12H with Mg, Phos    GI   #Partial SBO  - CT chest, abdomen, and pelvis w/ contrast concerning for possible partial SBO without transition point.  - Surgery consulted, no acute intervention at this time  - NPO for now with NGT to low wall suction  - Monitor for BMs, monitor for abdominal distension    HEME/ONC  #Leukocytosis  - Concern for septic process  - Trend CBCs    ID  #Concern for sepsis  - Patient initially presented with AMS, T 105.4, tachycardic, and tachypneic   - UA negative  - s/p vanco and zosyn x1 in ED 12/2  - s/p ceftriaxone 2g BID 12/3-12/4  - s/p vanco 12/3-12/4  - BCx and UC 12/2 NGTD  - monitor off ABx for now    ENDO  - No acute issues    DVT  - Hep gtt    Attending Attestation  Pt seen and examined with MICU team.  Agree with above except as noted below  53 yo man with history of TIA's (2011, 2013) and CVA x 3  2/2022 s/p tPA, 8/2022 s/p tPA, 11/5/22 with residual exp aphasia) on eliquis.  Hypercoaguable w/u negative, no PFO, strokes attributable poss to testosterone supplement which was d/c'd after last stroke.    Found down, T 105, unresponsive, bruised; intubated in ED. CT head unrevealing, EEG negative off sedation.  heparin drip for A/C.  Also JOSEFINA and rhabdo (CPK 15K).  Treated with IVF and lasix.  last creat 0.9.  MS improved and pt extubated yesterday.  On med floor he had seizure like activity with Rt sided "posturing" type motion, urinary incontinence, tongue biting, unresponsive.  Given ativan 2mg x 3 with brief cessation of movements between.  Intubated for airway instability and desaturation.  Per neuro recs, pt to be loaded with Keppra 60mg/kg (4.5g max), on propofol, vEEG ordered, MRI ordered. As for rhabdo, will continue ivf as tolerated, lasix as needed, follow CPK.  Check BMP/lytes.  Maintain lung protective vent settings.  Pt currently not requiring vasopressors.  f/u ABG and CXR post intubation.  DVT ppx once CNS bleed ruled out by CT - can resume heparin gtt for CVAs.  GOC full code.    Pt critically ill.  I spent 45 min critical care time excluding time spent on separate procedures

## 2022-12-05 NOTE — CHART NOTE - NSCHARTNOTEFT_GEN_A_CORE
: Viv Quinonez    INDICATION: critical illness    PROCEDURE:  [x] LIMITED ECHO  [x] LIMITED CHEST  [ ] LIMITED RETROPERITONEAL  [ ] LIMITED ABDOMINAL  [ ] LIMITED DVT  [ ] NEEDLE GUIDANCE VASCULAR  [ ] NEEDLE GUIDANCE THORACENTESIS  [ ] NEEDLE GUIDANCE PARACENTESIS  [ ] NEEDLE GUIDANCE PERICARDIOCENTESIS  [ ] OTHER    FINDINGS:  A line predominant pattern bilaterally, scattered B lines at L base  No pleural effusion  LVSF appears normal  RV < LV  IVC 2.3 cm      INTERPRETATION:  normal aeration pattern  grossly normal cardiac function    Images uploaded on [employer]

## 2022-12-05 NOTE — CHART NOTE - NSCHARTNOTEFT_GEN_A_CORE
Neurology Follow-Up:    Notified by primary team that pt may be seizing. Per RRT, pt was found to have tongue laceration w/ excess blood and "posturing-like movements" only on the R-side of his body. Pt continued to exhibit these movements when a total of 5mg of Ativan was given to pt. During this time, pt was noted to have urinary incontinence and vital sign changes including tachycardia and desatting to the 80s. Neuro called to evaluate pt at bedside. Upon arrival, pt found eyes closed and no response to noxious stimuli on all 4 extremities. Pt also for a very brief movement exhibited what appeared to be "decorticate" like posturing but only on R arm and leg. Anesthesia called to intubate patient as pt was no longer safely protecting his airway. Transferred to MICU for further evaluation.      ASSESSMENT:   52M PMHx significant for TIAs (2011, 2013), CVAs x3 (02/2022 s/p tPA, 8/3/2022 s/p tPA, 11/5/2022 with residual expressive aphasia) on Eliquis, and HLD BIBEMS after being found unresponsive at home. Found to have a fever up to 105.4 upon arrival to ED. CTH w/o acute pathology. Placed on EEG which showed generalized slowing. CBC showing leukocytosis of 14.79. CMP w/ AST//84. Lactate 8.4. [place] [address].    IMPRESSION: Episodes of unilateral "posturing" movements w/ vital sign changes and tongue biting/urinary incontinence possibly 2/2 primary epileptiform seizure activity vs. provoked seizures.     RECOMMENDATIONS:  [] Follow-up official read of CTH  [] MRI brain w/o contrast w/ epilepsy protocol   [] Load pt w/ Keppra 60mg/kg (4500mg Max) and start 1500mg TID maintenance dose  [] restart vEEG  [] Check CK, ammonia  [] If patient has a convulsion, please document accurately the length of the episode, and specifically what the patient was doing paying attention to eye opening vs closure, gaze deviation, shaking of extremities, tongue bite, urinary incontinence, any derangement of vital signs.   [] If patient has a generalized tonic clonic episode for >3 minutes or significant derangement of vital signs, may administer Ativan 2mg IV  [] Neurochecks and vitals Q4H  [] Rest of care per primary team      Trev Ku   PGY-2  Department of Neurology  [facility] Neurology Follow-Up:    Notified by primary team that pt may be seizing. Per RRT, pt was found to have tongue laceration w/ excess blood and "posturing-like movements" only on the R-side of his body. Pt continued to exhibit these movements when a total of 5mg of Ativan was given to pt. During this time, pt was noted to have urinary incontinence and vital sign changes including tachycardia and desatting to the 80s. Neuro called to evaluate pt at bedside. Upon arrival, pt found eyes closed and no response to noxious stimuli on all 4 extremities. Pt also for a very brief moment exhibited what appeared to be "decorticate" like posturing but only on R arm and leg. Anesthesia called to intubate patient as pt was no longer safely protecting his airway. Transferred to MICU for further evaluation.      ASSESSMENT:   52M PMHx significant for TIAs (2011, 2013), CVAs x3 (02/2022 s/p tPA, 8/3/2022 s/p tPA, 11/5/2022 with residual expressive aphasia) on Eliquis, and HLD BIBEMS after being found unresponsive at home. Found to have a fever up to 105.4 upon arrival to ED. CTH w/o acute pathology. Placed on EEG which showed generalized slowing. CBC showing leukocytosis of 14.79. CMP w/ AST//84. Lactate 8.4. [place] [address].    IMPRESSION: Episodes of unilateral "posturing" movements w/ vital sign changes and tongue biting/urinary incontinence possibly 2/2 primary epileptiform seizure activity vs. provoked seizures.     RECOMMENDATIONS:  [] Follow-up official read of CTH  [] MRI brain w/o contrast w/ epilepsy protocol   [] Load pt w/ Keppra 60mg/kg (4500mg Max) and start 1500mg TID maintenance dose  [] restart vEEG  [] Check CK, ammonia  [] If patient has a convulsion, please document accurately the length of the episode, and specifically what the patient was doing paying attention to eye opening vs closure, gaze deviation, shaking of extremities, tongue bite, urinary incontinence, any derangement of vital signs.   [] If patient has a generalized tonic clonic episode for >3 minutes or significant derangement of vital signs, may administer Ativan 2mg IV  [] Neurochecks and vitals Q4H  [] Rest of care per primary team      Trev Ku   PGY-2  Department of Neurology  [facility]

## 2022-12-05 NOTE — PROGRESS NOTE ADULT - ASSESSMENT
ASSESSMENT & PLAN:	  52-year-old male with a PMHx significant for TIAs (2011, 2013), CVAs x3 (02/2022 s/p tPA, 8/3/2022 s/p tPA, 11/5/2022 with residual expressive aphasia) on Eliquis, and HLD BIBEMS after being found unresponsive at home on the floor, last known well 12/1 at around noon. C-collar was placed. Patient was intubated in the ED for airway protection. CTH with no acute findings, vEEG with no identified seizures. Patient was weaned off pressors, extubated, and transitioned to floors 12/4/22. 12/5 am RRT called for seizure like activity with urinary incontinence, tongue biting, and R>L posturing, patient s/p ativan 2mg x3. Anesthesia called to RRT for intubation. MICU accepting patient for seizure like activity requiring intubation.     #AMS, Seizure like activity  - Patient found down and unresponsive at home on 12/2, last known well 12/1 at around noon. Intubated in ED 12/2, extubated in MICU 12/4  - RRT 12/5: seizure like acting with urinary incontinence, convulsions, and tongue biting; s/p ativan 2mg x3 with pauses in seizure activity following each; intubated for airway protection with rocuronium   - CTH and CT cervical spine 12/2 negative for any acute pathologies. Tox screen on admission negative. 12/5: Stable exam from prior CThead, chronic microvascular ischemic changes, parenchymal loss, dystrophic calcification of central portion of alexis unchanged.  - Video EEG 12/3-12/4 without any seizure like activity, moderate to severe nonspecific diffuse or multifocal cerebral dysfunction  - c/w propofol (started 12/5 during RRT)  - c/w keppra 1500mg TID maintenance, Keppra loaded 4.5g  - Pending MR brain for stroke evaluation, patient has loop recorder but MRI compatible per Radiology   - In progress: 24hr Video EEG placed on 12/5  - f/u neuro recs   - f/u prolactin      #CVAs/TIAs  - Hx of TIAs in 2011 and 2013, CVAs x3 in 02/22, 8/22, and 11/22 with residual expressive aphasia   - on Eliquis and Aspirin at home for hx of stroke  - PFO work up in past negative, no evidence of PFO on MIMI X2  - c/w ASA   - c/w heparin gtt  - Restart Statin when LFTs and CPK improve per neurology  - Being worked up for Mixed Connective Tissue Disease OP- f/u p-ANCA, c-ANCA  - Neuro recs appreciated    #Thecal sac flattening   - CT thoracic and lumbar spine showing degenerative disc disease T6-T7 through L4-L5 and mild disc bulges at L2-L3 through L4-L5 that flatten the ventral thecal sac and narrowing of the bilateral neural foramina  - Will Monitor for now, will consider neuro/neurosurg evaluation in the future      #HLD  - atorvastatin on hold due to elevated CK and LFTs  - please restart when possible for secondary stroke prevention    #?ASD/PFO  - Patient reportedly found to have a PFO in February 2022 during a stroke workup at [facility]. Patient presented for a PFO closure in November 2022. Notably, no PFO was found at the time and no intervention was performed by Dr. Lynn.    - TTE 11/5/22 EF 57% and grossly normal LV function  - MIMI performed bedside 12/5 1 of 2 studies (+) on bubble study (uploaded to [facility])    #Rhabdomyolysis  - CK 8720 on admission, peaked at 15k, now downtrending  - DDx: prolonged downtime myocitis v hyperthermia? (T 105.4 F) v sepsis   - c/w aggressive IVFs  - Will trend CKs- still above 10,000  - BMP Q12H with Mg, Phos    #Partial SBO  - CT chest, abdomen, and pelvis w/ contrast concerning for possible partial SBO without transition point.  - Surgery consulted, no acute intervention at this time  - NPO for now with NGT to low wall suction  - Monitor for BMs, monitor for abdominal distension    #Concern for sepsis  - Patient initially presented with AMS, T 105.4, tachycardic, and tachypneic   - UA negative  - s/p vanco and zosyn x1 in ED 12/2, ceftriaxone 2g BID 12/3-12/4, vanco 12/3-12/4  - BCx and UC 12/2 NGTD  - c/w vanc and ceftriaxone 2G BID for meningitis ppx as patient with persistent fevers vs aspiration PNA  - f/u Repeat BCx 12/5

## 2022-12-05 NOTE — PROGRESS NOTE ADULT - SUBJECTIVE AND OBJECTIVE BOX
Acute Care Surgery Progress Note     Subjective/24hour Events:   Patient seen and examined. Patient transferred to floor; however, patient transferred back to MICU overnight and reintubated for AMS 2/2 status epilepticus. No documented bowel movements in chart in the past day.      Vital Signs:  Vital Signs Last 24 Hrs  T(C): 36.7 (05 Dec 2022 03:45), Max: 37 (04 Dec 2022 20:00)  T(F): 98 (05 Dec 2022 03:45), Max: 98.6 (04 Dec 2022 20:00)  HR: 84 (05 Dec 2022 06:00) (48 - 90)  BP: 134/83 (05 Dec 2022 06:00) (87/53 - 145/87)  BP(mean): 96 (05 Dec 2022 06:00) (65 - 117)  RR: 16 (05 Dec 2022 06:00) (8 - 21)  SpO2: 100% (05 Dec 2022 06:00) (96% - 100%)    Parameters below as of 05 Dec 2022 06:00  Patient On (Oxygen Delivery Method): ventilator    O2 Concentration (%): 50    CAPILLARY BLOOD GLUCOSE      POCT Blood Glucose.: 120 mg/dL (05 Dec 2022 02:04)  POCT Blood Glucose.: 97 mg/dL (04 Dec 2022 14:06)      I&O's Detail    04 Dec 2022 07:01  -  05 Dec 2022 07:00  --------------------------------------------------------  IN:    Dexmedetomidine: 36 mL    Heparin Infusion: 143 mL    Heparin Infusion: 26 mL    Heparin Infusion: 26 mL    IV PiggyBack: 900 mL    Lactated Ringers: 3000 mL    Norepinephrine: 34 mL    Propofol: 58 mL  Total IN: 4223 mL    OUT:    FentaNYL: 0 mL    Indwelling Catheter - Urethral (mL): 4205 mL    Propofol: 0 mL  Total OUT: 4205 mL    Total NET: 18 mL            Physical Exam:  Gen: Sedated  CV: Regular rate  Resp: Intubated and mechanical ventilated  Abd: Soft, nondisteded      Labs:    12-05    131<L>  |  93<L>  |  10  ----------------------------<  90  2.7<LL>   |  28  |  0.71    Ca    6.8<L>      05 Dec 2022 06:30  Phos  3.3     12-05  Mg     1.70     12-05    TPro  4.9<L>  /  Alb  2.2<L>  /  TBili  0.5  /  DBili  x   /  AST  213<H>  /  ALT  80<H>  /  AlkPhos  49  12-05    LIVER FUNCTIONS - ( 05 Dec 2022 06:30 )  Alb: 2.2 g/dL / Pro: 4.9 g/dL / ALK PHOS: 49 U/L / ALT: 80 U/L / AST: 213 U/L / GGT: x                                 12.1   14.79 )-----------( 138      ( 05 Dec 2022 02:30 )             37.6     PTT - ( 05 Dec 2022 06:30 )  PTT:32.3 sec

## 2022-12-05 NOTE — EEG REPORT - NS EEG TEXT BOX
[facility]  Comprehensive Epilepsy Center  Report of Continuous Video EEG    [facility]: [address], [place] [address], Phone [phone]  [place] Office: [address], [address], [place] [address] Phone [phone]    [facility]: [address] [place] [address], Phone [phone]  [place] Office: [address] [place] [address], Phone [phone]    Patient Name: TAMI DUNHAM    Age: 52 year, : 1970  Patient ID: -, MRN #: -, [place]: MICU 1 -      Study Time/Date: 8AM on 2022  	  End Time/Date: 2022 on         			   Duration: 11H 30min    Study Information:    EEG Recording Technique:  The patient underwent continuous Video-EEG monitoring, using Telemetry System hardware on the XLTek Digital System. EEG and video data were stored on a computer hard drive with important events saved in digital archive files. The material was reviewed by a physician (electroencephalographer / epileptologist) on a daily basis. Donnie and seizure detection algorithms were utilized and reviewed. An EEG Technician attended to the patient, and was available throughout daytime work hours.  The epilepsy center neurologist was available in person or on call 24-hours per day.    EEG Placement and Labeling of Electrodes:  The EEG was performed utilizing 20 channel referential EEG connections (coronal over temporal over parasagittal montage) using all standard 10-20 electrode placements with EKG, with additional electrodes placed in the inferior temporal region using the modified 10-10 montage electrode placements for elective admissions, or if deemed necessary. Recording was at a sampling rate of 256 samples per second per channel. Time synchronized digital video recording was done simultaneously with EEG recording. A low light infrared camera was used for low light recording.     History:   VEEG AT BEDSIDE MICU 1  52 YEAR OLD MALE / FEMALE  COR:INTUBATED  P/W:AMS  PMH:VERITGO, HLD, CVA, HISTORY OF TIA'S  HV:NOT COMPLETED DUE TO PANDEMIC  PHOTIC:COMPLETED  A&OX INTUBATED  CONCERN FOR SEIZURE        Pertinent Medication  Diprivan (Propofol)  Sublamize  OFIRMEV  Levophed  ADACEL    Interpretation:    Daily EEG Visual Analysis  Findings: The background was continuous and somewhat reactive.   No posterior dominant rhythm seen.  Background predominantly consisted of theta, delta and faster activities.    Focal Slowing:   None were present.    Sleep Background:  Absence of state change.    Other Non-Epileptiform Findings:  None were present.    Interictal Epileptiform Activity:   None were present.    Events:  Clinical events: None recorded.  Seizures: None recorded.    Activation Procedures:   Hyperventilation was not performed.    Photic stimulation was not performed.     Artifacts:  Intermittent myogenic and movement artifacts were noted.    ECG:  The heart rate on single channel ECG was predominantly between 70-80 BPM.    EEG Summary / Classification:  Abnormal EEG   - Moderate to severe generalized slowing.    EEG Impression / Clinical Correlate:  Abnormal EEG study.  Moderate to severe nonspecific diffuse or multifocal cerebral dysfunction.     ________________________________________  Ronan Duran MD  Attending Physician, [facility] Epilepsy [place]

## 2022-12-05 NOTE — PROGRESS NOTE ADULT - ATTENDING COMMENTS
I have examined this sedated patient, reviewed pertinent labs and imaging, and discussed the case with colleagues, residents, and physician assistants on B Team rounds.  See adjacent note by consult resident.    Chief complaint:  altered MS    The active care issues are:  1. pSBO versus ileus on screening abd/pelvis CT done as part of trauma w/u in an intubated patient in whom we cannot clinically correlate    Consider gastrografin administration via gastric tube and serial AXRs to follow progression of radiopaque contrast through entirety of GI tract versus repeat cross-sectional imaging with oral contrast to definitely r/o pSBO in this sedated patient if there is continued concern for enteral intolerance (abdominal distension, high NGT output, etc).    Re-consult general surgery if clinical status changes.    The Acute Care Surgery (B Team) Practice:    urgent issues - spectra [phone]  nonurgent issues - [phone]  patient appointments or afterhours - [phone]

## 2022-12-06 LAB
ALBUMIN SERPL ELPH-MCNC: 2.6 G/DL — LOW (ref 3.3–5)
ALBUMIN SERPL ELPH-MCNC: 2.7 G/DL — LOW (ref 3.3–5)
ALP SERPL-CCNC: 52 U/L — SIGNIFICANT CHANGE UP (ref 40–120)
ALP SERPL-CCNC: 52 U/L — SIGNIFICANT CHANGE UP (ref 40–120)
ALT FLD-CCNC: 69 U/L — HIGH (ref 4–41)
ALT FLD-CCNC: 75 U/L — HIGH (ref 4–41)
ANION GAP SERPL CALC-SCNC: 11 MMOL/L — SIGNIFICANT CHANGE UP (ref 7–14)
ANION GAP SERPL CALC-SCNC: 11 MMOL/L — SIGNIFICANT CHANGE UP (ref 7–14)
ANION GAP SERPL CALC-SCNC: 7 MMOL/L — SIGNIFICANT CHANGE UP (ref 7–14)
APTT BLD: 47.8 SEC — HIGH (ref 27–36.3)
APTT BLD: 53.4 SEC — HIGH (ref 27–36.3)
APTT BLD: 62.2 SEC — HIGH (ref 27–36.3)
APTT BLD: 65.5 SEC — HIGH (ref 27–36.3)
AST SERPL-CCNC: 129 U/L — HIGH (ref 4–40)
AST SERPL-CCNC: 151 U/L — HIGH (ref 4–40)
B-OH-BUTYR SERPL-SCNC: 0.3 MMOL/L — SIGNIFICANT CHANGE UP (ref 0–0.4)
BILIRUB SERPL-MCNC: 0.4 MG/DL — SIGNIFICANT CHANGE UP (ref 0.2–1.2)
BILIRUB SERPL-MCNC: 0.4 MG/DL — SIGNIFICANT CHANGE UP (ref 0.2–1.2)
BUN SERPL-MCNC: 10 MG/DL — SIGNIFICANT CHANGE UP (ref 7–23)
BUN SERPL-MCNC: 12 MG/DL — SIGNIFICANT CHANGE UP (ref 7–23)
BUN SERPL-MCNC: 12 MG/DL — SIGNIFICANT CHANGE UP (ref 7–23)
CALCIUM SERPL-MCNC: 7.7 MG/DL — LOW (ref 8.4–10.5)
CALCIUM SERPL-MCNC: 7.8 MG/DL — LOW (ref 8.4–10.5)
CALCIUM SERPL-MCNC: 7.9 MG/DL — LOW (ref 8.4–10.5)
CHLORIDE SERPL-SCNC: 100 MMOL/L — SIGNIFICANT CHANGE UP (ref 98–107)
CHLORIDE SERPL-SCNC: 96 MMOL/L — LOW (ref 98–107)
CHLORIDE SERPL-SCNC: 97 MMOL/L — LOW (ref 98–107)
CK SERPL-CCNC: 3693 U/L — HIGH (ref 30–200)
CK SERPL-CCNC: 4686 U/L — HIGH (ref 30–200)
CO2 SERPL-SCNC: 26 MMOL/L — SIGNIFICANT CHANGE UP (ref 22–31)
CO2 SERPL-SCNC: 27 MMOL/L — SIGNIFICANT CHANGE UP (ref 22–31)
CO2 SERPL-SCNC: 29 MMOL/L — SIGNIFICANT CHANGE UP (ref 22–31)
CREAT SERPL-MCNC: 0.85 MG/DL — SIGNIFICANT CHANGE UP (ref 0.5–1.3)
CREAT SERPL-MCNC: 0.87 MG/DL — SIGNIFICANT CHANGE UP (ref 0.5–1.3)
CREAT SERPL-MCNC: 0.93 MG/DL — SIGNIFICANT CHANGE UP (ref 0.5–1.3)
EGFR: 104 ML/MIN/1.73M2 — SIGNIFICANT CHANGE UP
EGFR: 105 ML/MIN/1.73M2 — SIGNIFICANT CHANGE UP
EGFR: 99 ML/MIN/1.73M2 — SIGNIFICANT CHANGE UP
GAS PNL BLDV: SIGNIFICANT CHANGE UP
GLUCOSE SERPL-MCNC: 108 MG/DL — HIGH (ref 70–99)
GLUCOSE SERPL-MCNC: 111 MG/DL — HIGH (ref 70–99)
GLUCOSE SERPL-MCNC: 99 MG/DL — SIGNIFICANT CHANGE UP (ref 70–99)
HCT VFR BLD CALC: 29.5 % — LOW (ref 39–50)
HGB BLD-MCNC: 10.1 G/DL — LOW (ref 13–17)
MAGNESIUM SERPL-MCNC: 2.4 MG/DL — SIGNIFICANT CHANGE UP (ref 1.6–2.6)
MCHC RBC-ENTMCNC: 29.4 PG — SIGNIFICANT CHANGE UP (ref 27–34)
MCHC RBC-ENTMCNC: 34.2 GM/DL — SIGNIFICANT CHANGE UP (ref 32–36)
MCV RBC AUTO: 86 FL — SIGNIFICANT CHANGE UP (ref 80–100)
NRBC # BLD: 0 /100 WBCS — SIGNIFICANT CHANGE UP (ref 0–0)
NRBC # FLD: 0 K/UL — SIGNIFICANT CHANGE UP (ref 0–0)
PHOSPHATE SERPL-MCNC: 3.9 MG/DL — SIGNIFICANT CHANGE UP (ref 2.5–4.5)
PLATELET # BLD AUTO: 134 K/UL — LOW (ref 150–400)
POTASSIUM SERPL-MCNC: 3.4 MMOL/L — LOW (ref 3.5–5.3)
POTASSIUM SERPL-MCNC: 3.6 MMOL/L — SIGNIFICANT CHANGE UP (ref 3.5–5.3)
POTASSIUM SERPL-MCNC: 4 MMOL/L — SIGNIFICANT CHANGE UP (ref 3.5–5.3)
POTASSIUM SERPL-SCNC: 3.4 MMOL/L — LOW (ref 3.5–5.3)
POTASSIUM SERPL-SCNC: 3.6 MMOL/L — SIGNIFICANT CHANGE UP (ref 3.5–5.3)
POTASSIUM SERPL-SCNC: 4 MMOL/L — SIGNIFICANT CHANGE UP (ref 3.5–5.3)
PROT SERPL-MCNC: 5.3 G/DL — LOW (ref 6–8.3)
PROT SERPL-MCNC: 5.5 G/DL — LOW (ref 6–8.3)
RBC # BLD: 3.43 M/UL — LOW (ref 4.2–5.8)
RBC # FLD: 13.6 % — SIGNIFICANT CHANGE UP (ref 10.3–14.5)
SODIUM SERPL-SCNC: 131 MMOL/L — LOW (ref 135–145)
SODIUM SERPL-SCNC: 136 MMOL/L — SIGNIFICANT CHANGE UP (ref 135–145)
SODIUM SERPL-SCNC: 137 MMOL/L — SIGNIFICANT CHANGE UP (ref 135–145)
VANCOMYCIN TROUGH SERPL-MCNC: 9.6 UG/ML — LOW (ref 10–20)
WBC # BLD: 8.05 K/UL — SIGNIFICANT CHANGE UP (ref 3.8–10.5)
WBC # FLD AUTO: 8.05 K/UL — SIGNIFICANT CHANGE UP (ref 3.8–10.5)

## 2022-12-06 PROCEDURE — 74018 RADEX ABDOMEN 1 VIEW: CPT | Mod: 26

## 2022-12-06 PROCEDURE — 99291 CRITICAL CARE FIRST HOUR: CPT | Mod: GC

## 2022-12-06 PROCEDURE — 95720 EEG PHY/QHP EA INCR W/VEEG: CPT

## 2022-12-06 PROCEDURE — 93308 TTE F-UP OR LMTD: CPT | Mod: 26,GC

## 2022-12-06 PROCEDURE — 76604 US EXAM CHEST: CPT | Mod: 26,GC

## 2022-12-06 RX ORDER — POTASSIUM CHLORIDE 20 MEQ
10 PACKET (EA) ORAL
Refills: 0 | Status: COMPLETED | OUTPATIENT
Start: 2022-12-06 | End: 2022-12-06

## 2022-12-06 RX ORDER — POTASSIUM CHLORIDE 20 MEQ
10 PACKET (EA) ORAL
Refills: 0 | Status: DISCONTINUED | OUTPATIENT
Start: 2022-12-06 | End: 2022-12-06

## 2022-12-06 RX ORDER — ACETAMINOPHEN 500 MG
1000 TABLET ORAL ONCE
Refills: 0 | Status: COMPLETED | OUTPATIENT
Start: 2022-12-06 | End: 2022-12-06

## 2022-12-06 RX ORDER — PIPERACILLIN AND TAZOBACTAM 4; .5 G/20ML; G/20ML
3.38 INJECTION, POWDER, LYOPHILIZED, FOR SOLUTION INTRAVENOUS ONCE
Refills: 0 | Status: COMPLETED | OUTPATIENT
Start: 2022-12-06 | End: 2022-12-06

## 2022-12-06 RX ORDER — PIPERACILLIN AND TAZOBACTAM 4; .5 G/20ML; G/20ML
3.38 INJECTION, POWDER, LYOPHILIZED, FOR SOLUTION INTRAVENOUS EVERY 8 HOURS
Refills: 0 | Status: DISCONTINUED | OUTPATIENT
Start: 2022-12-06 | End: 2022-12-10

## 2022-12-06 RX ORDER — MAGNESIUM SULFATE 500 MG/ML
2 VIAL (ML) INJECTION ONCE
Refills: 0 | Status: COMPLETED | OUTPATIENT
Start: 2022-12-06 | End: 2022-12-06

## 2022-12-06 RX ORDER — SODIUM CHLORIDE 9 MG/ML
1000 INJECTION, SOLUTION INTRAVENOUS
Refills: 0 | Status: DISCONTINUED | OUTPATIENT
Start: 2022-12-06 | End: 2022-12-07

## 2022-12-06 RX ORDER — ACETAMINOPHEN 500 MG
500 TABLET ORAL ONCE
Refills: 0 | Status: COMPLETED | OUTPATIENT
Start: 2022-12-06 | End: 2022-12-06

## 2022-12-06 RX ORDER — VANCOMYCIN HCL 1 G
1250 VIAL (EA) INTRAVENOUS EVERY 8 HOURS
Refills: 0 | Status: DISCONTINUED | OUTPATIENT
Start: 2022-12-06 | End: 2022-12-07

## 2022-12-06 RX ADMIN — CHLORHEXIDINE GLUCONATE 1 APPLICATION(S): 213 SOLUTION TOPICAL at 11:54

## 2022-12-06 RX ADMIN — PIPERACILLIN AND TAZOBACTAM 200 GRAM(S): 4; .5 INJECTION, POWDER, LYOPHILIZED, FOR SOLUTION INTRAVENOUS at 11:43

## 2022-12-06 RX ADMIN — Medication 166.67 MILLIGRAM(S): at 22:58

## 2022-12-06 RX ADMIN — Medication 81 MILLIGRAM(S): at 11:43

## 2022-12-06 RX ADMIN — CHLORHEXIDINE GLUCONATE 15 MILLILITER(S): 213 SOLUTION TOPICAL at 17:37

## 2022-12-06 RX ADMIN — Medication 100 MILLIEQUIVALENT(S): at 12:09

## 2022-12-06 RX ADMIN — Medication 1 APPLICATION(S): at 17:36

## 2022-12-06 RX ADMIN — HEPARIN SODIUM 1900 UNIT(S)/HR: 5000 INJECTION INTRAVENOUS; SUBCUTANEOUS at 14:35

## 2022-12-06 RX ADMIN — Medication 1000 MILLIGRAM(S): at 11:25

## 2022-12-06 RX ADMIN — LEVETIRACETAM 400 MILLIGRAM(S): 250 TABLET, FILM COATED ORAL at 23:05

## 2022-12-06 RX ADMIN — PIPERACILLIN AND TAZOBACTAM 25 GRAM(S): 4; .5 INJECTION, POWDER, LYOPHILIZED, FOR SOLUTION INTRAVENOUS at 17:36

## 2022-12-06 RX ADMIN — PIPERACILLIN AND TAZOBACTAM 25 GRAM(S): 4; .5 INJECTION, POWDER, LYOPHILIZED, FOR SOLUTION INTRAVENOUS at 22:37

## 2022-12-06 RX ADMIN — Medication 200 MILLIGRAM(S): at 02:25

## 2022-12-06 RX ADMIN — HEPARIN SODIUM 1900 UNIT(S)/HR: 5000 INJECTION INTRAVENOUS; SUBCUTANEOUS at 21:16

## 2022-12-06 RX ADMIN — SODIUM CHLORIDE 150 MILLILITER(S): 9 INJECTION, SOLUTION INTRAVENOUS at 08:22

## 2022-12-06 RX ADMIN — Medication 100 MILLIEQUIVALENT(S): at 14:33

## 2022-12-06 RX ADMIN — LEVETIRACETAM 400 MILLIGRAM(S): 250 TABLET, FILM COATED ORAL at 11:32

## 2022-12-06 RX ADMIN — HEPARIN SODIUM 1700 UNIT(S)/HR: 5000 INJECTION INTRAVENOUS; SUBCUTANEOUS at 01:26

## 2022-12-06 RX ADMIN — Medication 25 GRAM(S): at 11:42

## 2022-12-06 RX ADMIN — Medication 250 MILLIGRAM(S): at 14:39

## 2022-12-06 RX ADMIN — Medication 40 MILLIEQUIVALENT(S): at 02:32

## 2022-12-06 RX ADMIN — Medication 500 MILLIGRAM(S): at 02:29

## 2022-12-06 RX ADMIN — CHLORHEXIDINE GLUCONATE 15 MILLILITER(S): 213 SOLUTION TOPICAL at 06:13

## 2022-12-06 RX ADMIN — CEFTRIAXONE 100 MILLIGRAM(S): 500 INJECTION, POWDER, FOR SOLUTION INTRAMUSCULAR; INTRAVENOUS at 00:52

## 2022-12-06 RX ADMIN — HEPARIN SODIUM 1900 UNIT(S)/HR: 5000 INJECTION INTRAVENOUS; SUBCUTANEOUS at 23:57

## 2022-12-06 RX ADMIN — Medication 100 MILLIEQUIVALENT(S): at 13:33

## 2022-12-06 RX ADMIN — Medication 400 MILLIGRAM(S): at 10:14

## 2022-12-06 RX ADMIN — HEPARIN SODIUM 1900 UNIT(S)/HR: 5000 INJECTION INTRAVENOUS; SUBCUTANEOUS at 08:20

## 2022-12-06 RX ADMIN — SODIUM CHLORIDE 75 MILLILITER(S): 9 INJECTION, SOLUTION INTRAVENOUS at 20:54

## 2022-12-06 RX ADMIN — Medication 250 MILLIGRAM(S): at 04:33

## 2022-12-06 RX ADMIN — Medication 100 MILLIEQUIVALENT(S): at 11:42

## 2022-12-06 NOTE — PROGRESS NOTE ADULT - SUBJECTIVE AND OBJECTIVE BOX
Patient is a 52y old  Male who presents with a chief complaint of Unresponsive (04 Dec 2022 07:55)      HPI: 52-year-old male with a PMHx significant for TIAs (2011, 2013), CVAs x3 (02/2022 s/p tPA, 8/3/2022 s/p tPA, 11/5/2022 with residual expressive aphasia) on Eliquis, and HLD BIBEMS after being found unresponsive at home. Per patient's 2 friends in the ED, patient was last spoken to around noon on 12/1. No one had heard from him or seen him since yesterday, was not responding to calls this am, so they went to check on him today and they found him unresponsive at home with head on floor turned to the side and one leg was on the bed. Patient was snoring per his friends. Friends called EMS. EMS states he was grunting but minimally responsive. Patient arrived to the ER covered in dried blood in his mouth with multiple ecchymosis on the left side of his face, neck,, chest, arm, thigh, LE. Patient was obtunded. Friend denies hx of ETOH or drug use. Of note, patient had a questionable ASD/PFO with possible closure on 11/22/22; per patient's friends no PFO was found and no closure was performed by Dr. Lynn.  Per outpatient records, patient previously on testosterone transdermal solution which was stopped on 11/15 by his urologist. Per patient's friends at bedside, patient was recently prescribed a mail-order testosterone replacement, unsure of the name and unsure if he received and started the medication. At baseline, patient is reported to be AOx3, able to ambulate without assistance, and takes care of ADLs without assistance.     On arrival in the ED, patient with rectal temp 105.4 F, tachy to 130s, hypertensive to 180/110, and tachypneic to 30. C-collar was placed. Patient was intubated in the ED for airway protection with etomidate and succinylcholine. Patient sedated with propofol and fentanyl. Started on vanco and zosyn. 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 CT cervical spine and maxillofacial scans negative. CT chest, abdomen, and pelvis w/ contrast concerning for possible partial SBO without transition point. Surgery consulted in ED, no acute surgical intervention. CT thoracic and lumbar spine showing degenerative disc disease T6-T7 through L4-L5 and mild disc bulges at L2-L3 through L4-L5 that flatten the ventral thecal sac and narrowing of the bilateral neural foramina.    Patient was downgraded to the floors 12/4 after patient was extubated with stroke work up being negative on CThead pending MR head and no seizure activity on EEG. Patient shortly after downgrade to floors from MICU, had an RRT for seizure-like activity - reported posturing (refer to RRT note) and was intubated for airway protection. Patient was then transferred to MICU for     INTERVAL HPI/OVERNIGHT EVENTS:   Patient continues to be intubated but is now placed on CPAP initiating breaths spontaneously. Video EEG is still on with no identified seizure activity overnight while on propofol but did demonstrate cognitive slowing. Patient is off propofol since 3am, off pressors, but is still not yet awake. No reported bowel movement overnight. Patient continues with persistent fevers, tmax 101 rectally. Vanc/Rocephin for meningitis PPX changed to Zosyn for ASP PNA coverage.      Vital Signs Last 24 Hrs  ICU Vital Signs Last 24 Hrs  T(C): 37.8 (06 Dec 2022 12:00), Max: 38.4 (05 Dec 2022 20:00)  T(F): 100 (06 Dec 2022 12:00), Max: 101.2 (05 Dec 2022 20:00)  HR: 88 (06 Dec 2022 12:00) (76 - 105)  BP: 138/103 (06 Dec 2022 12:00) (111/77 - 170/106)  BP(mean): 112 (06 Dec 2022 12:00) (87 - 128)  ABP: --  ABP(mean): --  RR: 16 (06 Dec 2022 06:00) (16 - 16)  SpO2: 99% (06 Dec 2022 12:00) (90% - 100%)    O2 Parameters below as of 06 Dec 2022 04:00  Patient On (Oxygen Delivery Method): ventilator,AC    O2 Concentration (%): 40    VENT: CPAP on 8 PIP and 5 PEEP 40% FiO2    MEDICATIONS  (STANDING):  aspirin enteric coated 81 milliGRAM(s) Oral daily  chlorhexidine 0.12% Liquid 15 milliLiter(s) Oral Mucosa every 12 hours  chlorhexidine 2% Cloths 1 Application(s) Topical daily  diphtheria/tetanus/pertussis (acellular) Vaccine (Adacel) 0.5 milliLiter(s) IntraMuscular once  heparin  Infusion. 1300 Unit(s)/Hr (13 mL/Hr) IV Continuous <Continuous>  lactated ringers. 1000 milliLiter(s) (75 mL/Hr) IV Continuous <Continuous>  levETIRAcetam  IVPB 1500 milliGRAM(s) IV Intermittent every 12 hours  piperacillin/tazobactam IVPB.- 3.375 Gram(s) IV Intermittent once  piperacillin/tazobactam IVPB.. 3.375 Gram(s) IV Intermittent every 8 hours  potassium chloride  10 mEq/100 mL IVPB 10 milliEquivalent(s) IV Intermittent every 1 hour  propofol Infusion 20 MICROgram(s)/kG/Min (11.5 mL/Hr) IV Continuous <Continuous>  vancomycin  IVPB 1000 milliGRAM(s) IV Intermittent every 8 hours    MEDICATIONS  (PRN):  aluminum hydroxide/magnesium hydroxide/simethicone Suspension 30 milliLiter(s) Oral every 4 hours PRN Dyspepsia  heparin   Injectable 8000 Unit(s) IV Push every 6 hours PRN For aPTT less than 40  heparin   Injectable 4000 Unit(s) IV Push every 6 hours PRN For aPTT between 40 - 57  melatonin 3 milliGRAM(s) Oral at bedtime PRN Insomnia  ondansetron Injectable 4 milliGRAM(s) IV Push every 8 hours PRN Nausea and/or Vomiting      HOSPITAL MEDICATIONS:    PHYSICAL EXAM:  GENERAL: NAD, well-groomed, well-developed  HEAD:  Atraumatic, Normocephalic  EYES: EOMI, PERRLA, conjunctiva and sclera clear  ENMT:  Moist mucous membranes  NECK: Supple, No JVD  CHEST/LUNG: Clear to auscultation bilaterally; No rales, rhonchi, wheezing, or rubs on vent for airway protection  HEART: Regular rate and rhythm; No mrg  ABDOMEN: Soft, Nontender, Nondistended; Bowel sounds present, NGT 14 Fr sal[place] sump in place for decompression for possible SBO versus ileus  NEURO: R arm posturing, R leg kicking, and intermittent left sided posturing   EXTREMITIES: No LE edema, no calf tenderness  SKIN: Multiple bruising and ecchymosis present bilaterally on chest, UE, LE, thighs, etc as noted on admission    Consultant(s) Notes Reviewed:  [x ] YES  [ ] NO  Care Discussed with Consultants/Other Providers [ x] YES  [ ] NO      LABS:                         9.5    13.25 )-----------( x        ( 05 Dec 2022 06:30 )             29.6     12-05    131<L>  |  93<L>  |  10  ----------------------------<  90  2.7<LL>   |  28  |  0.71    Ca    6.8<L>      05 Dec 2022 06:30  Phos  3.3     12-05  Mg     1.70     12-05    TPro  4.9<L>  /  Alb  2.2<L>  /  TBili  0.5  /  DBili  x   /  AST  213<H>  /  ALT  80<H>  /  AlkPhos  49  12-05    PTT - ( 05 Dec 2022 06:30 )  PTT:32.3 sec      CARDIAC MARKERS ( 04 Dec 2022 12:50 )  x     / x     / x     / x     / 17.8 ng/mL  CARDIAC MARKERS ( 04 Dec 2022 00:15 )  x     / x     / 58228 U/L / x     / x      CARDIAC MARKERS ( 03 Dec 2022 18:06 )  x     / x     / 43089 U/L / x     / x      CARDIAC MARKERS ( 03 Dec 2022 13:04 )  x     / x     / 14561 U/L / x     / x          LIVER FUNCTIONS - ( 05 Dec 2022 06:30 )  Alb: 2.2 g/dL / Pro: 4.9 g/dL / ALK PHOS: 49 U/L / ALT: 80 U/L / AST: 213 U/L / GGT: x             Culture - Blood (collected 02 Dec 2022 23:00)  Source: .Blood Blood-Peripheral  Preliminary Report (04 Dec 2022 04:01):    No growth to date.    Culture - Blood (collected 02 Dec 2022 22:45)  Source: .Blood Blood-Peripheral  Preliminary Report (04 Dec 2022 04:01):    No growth to date.    Culture - Urine (collected 02 Dec 2022 22:42)  Source: Catheterized Catheterized  Final Report (04 Dec 2022 13:04):    <10,000 CFU/mL Normal Urogenital Narda    RADIOLOGY, EKG & ADDITIONAL TESTS: Reviewed.         RADIOLOGY & ADDITIONAL TESTS:    EEG Summary / Classification:  Abnormal EEG   - Moderate to severe generalized slowing    EEG Impression / Clinical Correlate:  Abnormal EEG study.  Moderate to severe nonspecific diffuse or multifocal cerebral dysfunction.

## 2022-12-06 NOTE — PROGRESS NOTE ADULT - SUBJECTIVE AND OBJECTIVE BOX
Name of Patient : TAMI DUNHAM  MRN: [id number]  Date of visit: 22    Subjective: Patient seen and examined. No new events except as noted.         MEDICATIONS:  MEDICATIONS  (STANDING):  aspirin enteric coated 81 milliGRAM(s) Oral daily  chlorhexidine 0.12% Liquid 15 milliLiter(s) Oral Mucosa every 12 hours  chlorhexidine 2% Cloths 1 Application(s) Topical daily  diphtheria/tetanus/pertussis (acellular) Vaccine (Adacel) 0.5 milliLiter(s) IntraMuscular once  heparin  Infusion. 1300 Unit(s)/Hr (13 mL/Hr) IV Continuous <Continuous>  lactated ringers. 1000 milliLiter(s) (75 mL/Hr) IV Continuous <Continuous>  levETIRAcetam  IVPB 1500 milliGRAM(s) IV Intermittent every 12 hours  petrolatum Ophthalmic Ointment 1 Application(s) Both EYES two times a day  piperacillin/tazobactam IVPB.. 3.375 Gram(s) IV Intermittent every 8 hours  vancomycin  IVPB 1250 milliGRAM(s) IV Intermittent every 8 hours      PHYSICAL EXAM:  T(C): 37.8 (22 @ 16:00), Max: 38.2 (22 @ 08:00)  HR: 79 (22 @ 23:19) (76 - 91)  BP: 146/88 (22 @ 23:00) (121/86 - 148/100)  RR: 18 (22 @ 23:00) (16 - 19)  SpO2: 99% (22 @ 23:19) (87% - 100%)  Wt(kg): --  I&O's Summary    05 Dec 2022 07:  -  06 Dec 2022 07:00  --------------------------------------------------------  IN: 7140 mL / OUT: 3660 mL / NET: 3480 mL    06 Dec 2022 07:  -  06 Dec 2022 23:26  --------------------------------------------------------  IN: 1616 mL / OUT: 1305 mL / NET: 311 mL          Appearance: Normal	  HEENT:  PERRLA   Lymphatic: No lymphadenopathy   Cardiovascular: Normal S1 S2, no JVD  Respiratory: normal effort , clear  Gastrointestinal:  Soft, Non-tender  Skin: No rashes,  warm to touch  Psychiatry:  Mood & affect appropriate  Musculuskeletal: No edema      All labs, Imaging and EKGs personally reviewed                           10.1   8.05  )-----------( 134      ( 06 Dec 2022 00:45 )             29.5               12-    137  |  100  |  10  ----------------------------<  111<H>  4.0   |  26  |  0.85    Ca    7.9<L>      06 Dec 2022 13:30  Phos  3.9     12-  Mg     2.40     12-    TPro  5.3<L>  /  Alb  2.6<L>  /  TBili  0.4  /  DBili  x   /  AST  129<H>  /  ALT  69<H>  /  AlkPhos  52  12-06    PTT - ( 06 Dec 2022 21:06 )  PTT:65.5 sec       CARDIAC MARKERS ( 06 Dec 2022 13:30 )  x     / x     / 3693 U/L / x     / x      CARDIAC MARKERS ( 06 Dec 2022 00:45 )  x     / x     / 4686 U/L / x     / x      CARDIAC MARKERS ( 05 Dec 2022 21:16 )  x     / x     / 5106 U/L / x     / x                  Urinalysis Basic - ( 05 Dec 2022 21:00 )    Color: Yellow / Appearance: Clear / S.048 / pH: x  Gluc: x / Ketone: Moderate  / Bili: Negative / Urobili: <2 mg/dL   Blood: x / Protein: 100 mg/dL / Nitrite: Negative   Leuk Esterase: Negative / RBC: 0 /HPF / WBC 1 /HPF   Sq Epi: x / Non Sq Epi: x / Bacteria: x                  22 @ 07:01  -  22 @ 07:00  --------------------------------------------------------  IN: 7140 mL / OUT: 3660 mL / NET: 3480 mL    22 @ 07:01  -  22 @ 23:26  --------------------------------------------------------  IN: 1616 mL / OUT: 1305 mL / NET: 311 mL

## 2022-12-06 NOTE — DIETITIAN INITIAL EVALUATION ADULT - ENTERAL
--- If/when to start TF, initiate Pivot 1.5 @ 15mL/hr, then increase by 10mL q 4hrs, as tolerated, to goal of 55mL/hr continuously    will provide:  1320mL total volume  1980 KCals  124g protein   990mL free water

## 2022-12-06 NOTE — PROGRESS NOTE ADULT - SUBJECTIVE AND OBJECTIVE BOX
Neurology Progress Note    S: Patient seen and examined intubated on sedation in ICU     Medication:  MEDICATIONS  (STANDING):  aspirin enteric coated 81 milliGRAM(s) Oral daily  chlorhexidine 0.12% Liquid 15 milliLiter(s) Oral Mucosa every 12 hours  chlorhexidine 2% Cloths 1 Application(s) Topical daily  diphtheria/tetanus/pertussis (acellular) Vaccine (Adacel) 0.5 milliLiter(s) IntraMuscular once  heparin  Infusion. 1300 Unit(s)/Hr (13 mL/Hr) IV Continuous <Continuous>  lactated ringers. 1000 milliLiter(s) (75 mL/Hr) IV Continuous <Continuous>  levETIRAcetam  IVPB 1500 milliGRAM(s) IV Intermittent every 12 hours  petrolatum Ophthalmic Ointment 1 Application(s) Both EYES two times a day  piperacillin/tazobactam IVPB.. 3.375 Gram(s) IV Intermittent every 8 hours  propofol Infusion 20 MICROgram(s)/kG/Min (11.5 mL/Hr) IV Continuous <Continuous>  vancomycin  IVPB 1250 milliGRAM(s) IV Intermittent every 8 hours    MEDICATIONS  (PRN):  aluminum hydroxide/magnesium hydroxide/simethicone Suspension 30 milliLiter(s) Oral every 4 hours PRN Dyspepsia  heparin   Injectable 8000 Unit(s) IV Push every 6 hours PRN For aPTT less than 40  heparin   Injectable 4000 Unit(s) IV Push every 6 hours PRN For aPTT between 40 - 57    Vitals:    Vital Signs Last 24 Hrs  T(C): 37.8 (12-06-22 @ 12:00), Max: 38.4 (12-05-22 @ 20:00)  T(F): 100 (12-06-22 @ 12:00), Max: 101.2 (12-05-22 @ 20:00)  HR: 82 (12-06-22 @ 17:00) (76 - 94)  BP: 148/100 (12-06-22 @ 17:00) (111/77 - 148/100)  BP(mean): 114 (12-06-22 @ 17:00) (87 - 114)  RR: 18 (12-06-22 @ 16:00) (16 - 19)  SpO2: 97% (12-06-22 @ 17:00) (87% - 100%)        General Exam:   General Appearance: Appropriately dressed and in no acute distress       Head: Normocephalic, atraumatic and no dysmorphic features  Ear, Nose, and Throat: Moist mucous membranes +ETT   CVS: S1S2+  Resp: No SOB, no wheeze or rhonchi  Abd: soft NTND  Extremities: No edema, no cyanosis  Skin: No bruises, no rashes     Neurological Exam: (propofol)   Mental Status: intubated, sedated, no verbal, not following today   Cranial Nerves: PERRL, EOMI, VFFC, sensation V1-V3 intact,  no obvious facial asymmetry  + corneals, + gag    Motor: no spontaneous   Sensation:minimal withdrawal to noxious x 4    Reflexes: 1+ throughout at biceps, brachioradialis, triceps, patellars and ankles bilaterally and equal. No clonus. R toe and L toe were both downgoing.  Coordination: unable   Gait: unable     I personally reviewed the below data/images/labs:      CBC Full  -  ( 06 Dec 2022 00:45 )  WBC Count : 8.05 K/uL  RBC Count : 3.43 M/uL  Hemoglobin : 10.1 g/dL  Hematocrit : 29.5 %  Platelet Count - Automated : 134 K/uL  Mean Cell Volume : 86.0 fL  Mean Cell Hemoglobin : 29.4 pg  Mean Cell Hemoglobin Concentration : 34.2 gm/dL  Auto Neutrophil # : x  Auto Lymphocyte # : x  Auto Monocyte # : x  Auto Eosinophil # : x  Auto Basophil # : x  Auto Neutrophil % : x  Auto Lymphocyte % : x  Auto Monocyte % : x  Auto Eosinophil % : x  Auto Basophil % : x    12-06    137  |  100  |  10  ----------------------------<  111<H>  4.0   |  26  |  0.85    Ca    7.9<L>      06 Dec 2022 13:30  Phos  3.9     12-06  Mg     2.40     12-06    TPro  5.3<L>  /  Alb  2.6<L>  /  TBili  0.4  /  DBili  x   /  AST  129<H>  /  ALT  69<H>  /  AlkPhos  52  12-06      < from: CT Head No Cont (12.02.22 @ 20:27) >    ACC: [id number] EXAM:  CT CERVICAL SPINE                        ACC: [id number] EXAM:  CT MAXILLOFACIAL                        ACC: [id number] EXAM:  CT BRAIN                          PROCEDURE DATE:  12/02/2022        INTERPRETATION:  CLINICAL INDICATION: Trauma.    Technique: Noncontrast axial CT of the head, facial bones, and cervical   spine was performed. 3-D reformats of the facial bones was obtained.   Coronal and sagittal reformats were obtained.      COMPARISON: None.    FINDINGS:  Head CT:  The ventricles and sulci are within normal limits. 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 There is no intraparenchymal hematoma, mass effect   or midline shift. No abnormal extra-axial fluid collections or   hemorrhages are present.    There is swelling of the left lateral scalp. The calvarium is intact.   There are scattered mucosal inflammatory changes in the paranasal sinuses.      Cervical spine CT:  Alignment ismaintained. Vertebral bodies are normal in height, without   evidence of fracture or dislocation. Prevertebral soft tissues are within   normal limits without soft tissue swelling or hematoma.    Intervertebral discs are intact. Neural foramina and spinal canal are   intact.    The visualized lung apices are within normal limits.      Facial bone CT:    No acute facial fracture.    There are scattered mucosal inflammatory changes in the paranasal   sinuses. Mastoid air cells are clear.    Soft tissues appear unremarkable.        IMPRESSION:  CT HEAD: No acute abnormality.  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  CT CERVICAL SPINE: No acute abnormality  CT FACIAL BONE: No acute abnormality    --- End of Report ---       DELGADO IVAN MD; Attending Radiologist  This document has been electronically signed. Dec  2 2022  9:02PM    < end of copied text >  < from: CT Lumbar Spine No Cont (12.02.22 @ 20:27) >    ACC: [id number] EXAM:  CT LUMBAR SPINE                        ACC: [id number] EXAM:  CT THORACIC SPINE                          PROCEDURE DATE:  12/02/2022          INTERPRETATION:  CT thoracic and lumbar spine without IV contrast    CLINICAL INFORMATION:  Trauma  Back pain, fracture.    TECHNIQUE:  Contiguous axial 2 mm sections were obtained through the   thoracic and lumbar spine using a single helical acquisition.     Additional 2 mm sagittal and coronal reconstructions of the spine were   obtained. These additional reformatted images were used to evaluate the   spine for alignment, vertebral fractures and the integrity of the the   posterior elements.   This scan was performed using automatic exposure   control (radiation dose reduction software) to obtain a diagnostic image   quality scan with patient dose as low as reasonably achievable.    FINDINGS:   No prior similar studies are available for review    Thoracic and lumbar vertebral body heights are maintained. No vertebral   fracture is seen. No destructive bone lesion is found.  Alignment is   preserved.  Facet joints appear intact and aligned.    Thoracic and lumbar intervertebral disc spaces appear intact.   Degenerative disc disease and spondylosis is noted at T6-T7 throughL4-5   with loss of disc height and associated degenerative endplate changes.   Mild disc bulges at L2-3 through L4-5 flatten the ventral thecal sac and   narrow the BILATERAL neural foramina.    No paraspinal mass is recognized.  Paraspinal soft tissues appear intact.      IMPRESSION:  Degenerative disc disease and spondylosis is noted at T6-T7   through L4-5 with loss of disc height and associated degenerative   endplate changes. Mild disc bulges at L2-3 through L4-5 flatten the   ventral thecal sac and narrow the BILATERAL neural foramina   No   vertebral fracture is recognized.    --- End of Report ---       ANGIE ESCOBAR MD; Attending Radiologist  This document has been electronically signed. Dec  2 2022  8:55PM    < end of copied text >    EEG Classification / Summary:  Abnormal EEG in a comatose patient due to diffuse suppression gradually improving to discontinuous background, with right hemispheric relative attenuation/suppression. No epileptiform abnormalities or seizures are captured.    Clinical Impression:  Severe diffuse cerebral dysfunction that gradually improves is nonspecific in etiology. In this case, it may be related to sedating medication (propofol) with improvement after decreasing and stopping the medication.  Right hemispheric focal cerebral dysfunction can be structural or functional in etiology.

## 2022-12-06 NOTE — PROGRESS NOTE ADULT - ATTENDING COMMENTS
52 M with TIAS, CVAS on eliquis here with unresponsiveness, concern for sepsis and acute hypoxemic respiratory failure requiring intubation, extubated, then with seizure on floors requiring intubation due to acute hypoxemic respiratory failure, rhabdo.    Unclear etiology of seizure; concern he has aspiration pneumonia now    - wean sedation as tolerated and f/u EEG read  - c/w abx for aspiration pneumonia, f/u cultures  - on vent for acute hypoxemic and hypercapnic respiratory failure, PS trials as tolerated  - c/w IVF for rhabdo  - f/u MRI    Attending Attestation:  I was present during the key portions of the procedure and immediately available during the entire procedure.  Brittnee Burton MD  Attending  Pulmonary & Critical Care Medicine

## 2022-12-06 NOTE — DIETITIAN INITIAL EVALUATION ADULT - OTHER INFO
Pt. remains intubated/sedated @ this time.  At least 579 non-nutritive lipid calories provided by Propofol over past 24hrs.  Propofol now on hold in attempt to wean.  VEEG in progress.    NGT previously to suction D/Cd.  Abdominal X-Ray was about to be taken at time of RDN encounter... pending results and findings in regards to SBO.  Plan is to initiate enteral feedings, once medically appropriate.  Discussed w resident physicians.

## 2022-12-06 NOTE — PROGRESS NOTE ADULT - ASSESSMENT
52-year-old  M known to me from outpatient setting with  TIAs (2011, 2013), Strokes x3 (02/2022 s/p tPA, 8/3/2022 s/p tPA, 11/5/2022 with residual expressive aphasia) on Eliquis, was on testosterone outpatient stopped after last stroke, HLD BIBEMS after being found unresponsive  Temp 105.4 on arrival tachy and /110. 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   CT cervical spine and maxillofacial scans negative.  CT chest, abdomen, and pelvis w/ contrast concerning for possible partial SBO without transition point. Surgery consulted in ED, no acute surgical intervention at this time.   CT thoracic and lumbar spine showing degenerative disc disease T6-T7 through L4-L5 and mild disc bulges at L2-L3 through L4-L5 that flatten the ventral thecal sac and narrowing of the bilateral neural foramina.  MRI 11/2022: R centrum semiovale and R posterior frontal infarcts   takes adderall at home   + rhabdo  EEG no seizures   + transaminitis   CTH 12/5 stable   outpatient hypercoag workup neg   12/5 extubated then reintibuated for seizure activity and tx back to ICU   EEG this AM 12/5 with only slowing   EEG 12/6 slowing but no seizures   spoke with Winter Conti (friend) who states after he was taken off the testosterone he ordered a mail order testosterone supplement, unclear if he started it yet, unclear what this was    Impression:   1) AMS of unclear etiology, possible now seizure, related to adderral withdrawal?   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 had MIMI was question of PFO but not found. s/p ILR     - loaded with keppra overnight and started 1500mg TID.  please lower to 1500mg BID. eeg no seizurse   - MRI brain seizure protocol   - monitor LFTs , downtrending   - IVF  - CPK elevated. trend   - would consider endocrine   - there was some concern outpatient he may have a mixed  connective tissue disorder   - fever returned.  would consider LP at this point after MRI if no source of infection found   - CTX and vanco  - statin therapy for secondary stroke prevention when LFTS and CPK improve   - on heparin drip    - telemetry  - PT/OT/SS/SLP, OOBC  - check FS, glucose control <180  - GI/DVT ppx  - Thank you for allowing me to participate in the care of this patient. Call with questions.   - spoke with ICU team  - spoke with Winter Conti at [phone] for more collateral info and to provide update.   Braxton Forde MD  Vascular Neurology  Office: [phone]

## 2022-12-06 NOTE — DIETITIAN INITIAL EVALUATION ADULT - PERTINENT MEDS FT
MEDICATIONS  (STANDING):  aspirin enteric coated 81 milliGRAM(s) Oral daily  chlorhexidine 0.12% Liquid 15 milliLiter(s) Oral Mucosa every 12 hours  chlorhexidine 2% Cloths 1 Application(s) Topical daily  diphtheria/tetanus/pertussis (acellular) Vaccine (Adacel) 0.5 milliLiter(s) IntraMuscular once  heparin  Infusion. 1300 Unit(s)/Hr (13 mL/Hr) IV Continuous <Continuous>  lactated ringers. 1000 milliLiter(s) (75 mL/Hr) IV Continuous <Continuous>  levETIRAcetam  IVPB 1500 milliGRAM(s) IV Intermittent every 12 hours  piperacillin/tazobactam IVPB.- 3.375 Gram(s) IV Intermittent once  piperacillin/tazobactam IVPB.. 3.375 Gram(s) IV Intermittent every 8 hours  potassium chloride  10 mEq/100 mL IVPB 10 milliEquivalent(s) IV Intermittent every 1 hour  propofol Infusion 20 MICROgram(s)/kG/Min (11.5 mL/Hr) IV Continuous <Continuous>  vancomycin  IVPB 1000 milliGRAM(s) IV Intermittent every 8 hours    MEDICATIONS  (PRN):  aluminum hydroxide/magnesium hydroxide/simethicone Suspension 30 milliLiter(s) Oral every 4 hours PRN Dyspepsia  heparin   Injectable 8000 Unit(s) IV Push every 6 hours PRN For aPTT less than 40  heparin   Injectable 4000 Unit(s) IV Push every 6 hours PRN For aPTT between 40 - 57  melatonin 3 milliGRAM(s) Oral at bedtime PRN Insomnia  ondansetron Injectable 4 milliGRAM(s) IV Push every 8 hours PRN Nausea and/or Vomiting

## 2022-12-06 NOTE — PROGRESS NOTE ADULT - ASSESSMENT
ASSESSMENT & PLAN:	  52-year-old male with a PMHx significant for TIAs (2011, 2013), CVAs x3 (02/2022 s/p tPA, 8/3/2022 s/p tPA, 11/5/2022 with residual expressive aphasia) on Eliquis, and HLD BIBEMS after being found unresponsive at home on the floor, last known well 12/1 at around noon. C-collar was placed. Patient was intubated in the ED for airway protection. CTH with no acute findings, vEEG with no identified seizures. Patient was weaned off pressors, extubated, and transitioned to floors 12/4/22. 12/5 am RRT called for seizure like activity with urinary incontinence, tongue biting, and R>L posturing, patient s/p ativan 2mg x3. Anesthesia called to RRT for intubation. MICU accepting patient for seizure like activity requiring intubation.     #AMS, Seizure like activity  - Patient found down and unresponsive at home on 12/2, last known well 12/1 at around noon. Intubated in ED 12/2, extubated in MICU 12/4  - RRT 12/5: seizure like acting with urinary incontinence, convulsions, and tongue biting; s/p ativan 2mg x3 with pauses in seizure activity following each; intubated for airway protection with rocuronium   - CTH and CT cervical spine 12/2 negative for any acute pathologies. Tox screen on admission negative. 12/5: Stable exam from prior [facility]Thead, chronic microvascular ischemic changes, parenchymal loss, dystrophic calcification of central portion of alexis unchanged.  - Video EEG 12/3-12/4 without any seizure like activity, moderate to severe nonspecific diffuse or multifocal cerebral dysfunction  - c/w propofol (started 12/5 during RRT)  - c/w keppra 1500mg TID maintenance, Keppra loaded 4.5g  - Pending MR brain for stroke evaluation, patient has loop recorder but MRI compatible per Radiology   - In progress: 24hr Video EEG placed on 12/5  - f/u neuro recs   - f/u prolactin      #CVAs/TIAs  - Hx of TIAs in 2011 and 2013, CVAs x3 in 02/22, 8/22, and 11/22 with residual expressive aphasia   - on Eliquis and Aspirin at home for hx of stroke  - PFO work up in past negative, no evidence of PFO on MIMI X2  - c/w ASA   - c/w heparin gtt  - Restart Statin when LFTs and CPK improve per neurology  - Being worked up for Mixed Connective Tissue Disease OP- f/u p-ANCA, c-ANCA  - Neuro recs appreciated    #Thecal sac flattening   - CT thoracic and lumbar spine showing degenerative disc disease T6-T7 through L4-L5 and mild disc bulges at L2-L3 through L4-L5 that flatten the ventral thecal sac and narrowing of the bilateral neural foramina  - Will Monitor for now, will consider neuro/neurosurg evaluation in the future      #HLD  - atorvastatin on hold due to elevated CK and LFTs  - please restart when possible for secondary stroke prevention    #?ASD/PFO  - Patient reportedly found to have a PFO in February 2022 during a stroke workup at [place]. Patient presented for a PFO closure in November 2022. Notably, no PFO was found at the time and no intervention was performed by Dr. Lynn.    - TTE 11/5/22 EF 57% and grossly normal LV function  - MIMI performed bedside 12/5 1 of 2 studies (+) on bubble study (uploaded to [employer] [employer])    #Rhabdomyolysis  - CK 8720 on admission, peaked at 15k, now downtrending  - DDx: prolonged downtime myocitis v hyperthermia? (T 105.4 F) v sepsis   - c/w aggressive IVFs  - Will trend CKs- still above 10,000  - BMP Q12H with Mg, Phos    #Partial SBO  - CT chest, abdomen, and pelvis w/ contrast concerning for possible partial SBO without transition point.  - Surgery consulted, no acute intervention at this time  - NPO for now with NGT to low wall suction  - Monitor for BMs, monitor for abdominal distension    #Concern for sepsis  - Patient initially presented with AMS, T 105.4, tachycardic, and tachypneic   - UA negative  - s/p vanco and zosyn x1 in ED 12/2, ceftriaxone 2g BID 12/3-12/4, vanco 12/3-12/4  - BCx and UC 12/2 NGTD  - c/w vanc and ceftriaxone 2G BID for meningitis ppx as patient with persistent fevers vs aspiration PNA  - f/u Repeat BCx 12/5

## 2022-12-06 NOTE — DIETITIAN INITIAL EVALUATION ADULT - PERTINENT LABORATORY DATA
12-06    131<L>  |  97<L>  |  12  ----------------------------<  108<H>  3.6   |  27  |  0.87    Ca    7.7<L>      06 Dec 2022 07:17  Phos  3.1     12-05  Mg     1.80     12-05    TPro  5.3<L>  /  Alb  2.6<L>  /  TBili  0.4  /  DBili  x   /  AST  129<H>  /  ALT  69<H>  /  AlkPhos  52  12-06      A1C with Estimated Average Glucose Result: 5.6 % (11-05-22 @ 06:57)

## 2022-12-06 NOTE — PROGRESS NOTE ADULT - ASSESSMENT
ASSESSMENT & PLAN:	  52-year-old male with a PMHx significant for TIAs (2011, 2013), CVAs x3 (02/2022 s/p tPA, 8/3/2022 s/p tPA, 11/5/2022 with residual expressive aphasia) on Eliquis, and HLD BIBEMS after being found unresponsive at home on the floor, last known well 12/1 at around noon. C-collar was placed. Patient was intubated in the ED for airway protection. CTH with no acute findings, vEEG with no identified seizures. Patient was weaned off pressors, extubated, and transitioned to floors 12/4/22. 12/5 am RRT called for seizure like activity with urinary incontinence, tongue biting, and R>L posturing, patient s/p ativan 2mg x3. Anesthesia called to RRT for intubation. MICU accepting patient for seizure like activity requiring intubation.     NEURO  #AMS, Seizure like activity  - Patient found down and unresponsive at home on 12/2, last known well 12/1 at around noon. Intubated in ED 12/2, extubated in MICU 12/4  - RRT 12/5: seizure like acting with urinary incontinence, convulsions, and tongue biting; s/p ativan 2mg x3 with pauses in seizure activity following each; intubated for airway protection with rocuronium   - CTH and CT cervical spine 12/2 negative for any acute pathologies. Tox screen on admission negative. 12/5: Stable exam from prior [facility], chronic microvascular ischemic changes, parenchymal loss, dystrophic calcification of central portion of alexis unchanged.  - Video EEG 12/3-12/4 without any seizure like activity, moderate to severe nonspecific diffuse or multifocal cerebral dysfunction  - propofol off since 3am  - c/w keppra 1500mg TID maintenance, Keppra loaded 4.5g  - Pending MR brain for stroke evaluation, patient has loop recorder but MRI compatible per Radiology   - In progress: 24hr Video EEG placed on 12/5 - EEG read without any seizure activity, some slowing likely due to propofol   - f/u neuro recs   - f/u prolactin      #CVAs/TIAs  - Hx of TIAs in 2011 and 2013, CVAs x3 in 02/22, 8/22, and 11/22 with residual expressive aphasia   - on Eliquis and Aspirin at home for hx of stroke  - PFO work up in past negative, no evidence of PFO on MIMI X2  - c/w ASA   - c/w heparin gtt  - Restart Statin when LFTs and CPK improve per neurology  - Being worked up for Mixed Connective Tissue Disease OP- f/u p-ANCA, c-ANCA/ [id number] negative  - Neuro recs appreciated    #Thecal sac flattening   - CT thoracic and lumbar spine showing degenerative disc disease T6-T7 through L4-L5 and mild disc bulges at L2-L3 through L4-L5 that flatten the ventral thecal sac and narrowing of the bilateral neural foramina  - Will Monitor for now, will consider neuro/neurosurg evaluation in the future    PULM  #Intubated  - Intubated 12/2, extubated 12/4 in MICU  - Re-intubated 12/5 am for airway protection iso seizure on floors during RRT  - Trend ABGs  - 12/5 CXR: ETT in place, no acute lung pathology  - CPAP starting 12/6    CARDIAC  #HLD  - atorvastatin on hold due to elevated CK and LFTs  - please restart when possible for secondary stroke prevention    #?ASD/PFO  - Patient reportedly found to have a PFO in February 2022 during a stroke workup at [place]. Patient presented for a PFO closure in November 2022. Notably, no PFO was found at the time and no intervention was performed by Dr. Lynn.    - TTE 11/5/22 EF 57% and grossly normal LV function  - MIMI performed bedside 12/5 1 of 2 studies (+) on bubble study (uploaded to [facility])    RENAL  #Rhabdomyolysis  - CK 8720 on admission, peaked at 15k, now downtrending  - DDx: prolonged downtime myocitis v hyperthermia? (T 105.4 F) v sepsis   - c/w aggressive IVFs  - Will trend CKs- still above 10,000  - BMP Q12H with Mg, Phos    GI   #Partial SBO  - CT chest, abdomen, and pelvis w/ contrast concerning for possible partial SBO without transition point.  - Surgery consulted, no acute intervention at this time  - NPO for now with NGT to low wall suction  - Monitor for BMs, monitor for abdominal distension  - Pending results 12/5 KUB  - Plan to start feeds overnight if no evidence of ileus/sbo on repeat KUB    HEME/ONC  #Leukocytosis  - Concern for septic process  - Trend CBCs    ID  #Concern for sepsis  - Patient initially presented with AMS, T 105.4, tachycardic, and tachypneic   - UA negative  - s/p vanco and zosyn x1 in ED 12/2, ceftriaxone 2g BID 12/3-12/4, vanco 12/3-12/4  - BCx (12/2 NGTD repeat 12/5 NGTD) and UC 12/2 NGTD  - vanc 1G TID and ceftriaxone 2G BID for meningitis PPX 12/5   - 12/6 vanc 1G TID pending MRSA swab check and Zosyn 12/6 for ASP PNA coverage for persistent fevers      ENDO  - Concern of continued exogenous testosterone use contributing to hypercoagulation?  - Hold off on endo consult better history is obtained  - No acute issues    DVT  - Hep gtt ASSESSMENT & PLAN:	  52-year-old male with a PMHx significant for TIAs (2011, 2013), CVAs x3 (02/2022 s/p tPA, 8/3/2022 s/p tPA, 11/5/2022 with residual expressive aphasia) on Eliquis, and HLD BIBEMS after being found unresponsive at home on the floor, last known well 12/1 at around noon. C-collar was placed. Patient was intubated in the ED for airway protection. CTH with no acute findings, vEEG with no identified seizures. Patient was weaned off pressors, extubated, and transitioned to floors 12/4/22. 12/5 am RRT called for seizure like activity with urinary incontinence, tongue biting, and R>L posturing, patient s/p ativan 2mg x3. Anesthesia called to RRT for intubation. MICU accepting patient for seizure like activity requiring intubation.     NEURO  #AMS, Seizure like activity  - Patient found down and unresponsive at home on 12/2, last known well 12/1 at around noon. Intubated in ED 12/2, extubated in MICU 12/4  - RRT 12/5: seizure like acting with urinary incontinence, convulsions, and tongue biting; s/p ativan 2mg x3 with pauses in seizure activity following each; intubated for airway protection with rocuronium   - CTH and CT cervical spine 12/2 negative for any acute pathologies. Tox screen on admission negative. 12/5: Stable exam from prior [facility]ad, chronic microvascular ischemic changes, parenchymal loss, dystrophic calcification of central portion of alexis unchanged.  - Video EEG 12/3-12/4 without any seizure like activity, moderate to severe nonspecific diffuse or multifocal cerebral dysfunction  - propofol off since 3am  - c/w keppra 1500mg TID maintenance, Keppra loaded 4.5g  - Pending MR brain for stroke evaluation, patient has loop recorder but MRI compatible per Radiology   - In progress: 24hr Video EEG placed on 12/5 - EEG read without any seizure activity, some slowing likely due to propofol   - f/u neuro recs   - f/u prolactin      #CVAs/TIAs  - Hx of TIAs in 2011 and 2013, CVAs x3 in 02/22, 8/22, and 11/22 with residual expressive aphasia   - on Eliquis and Aspirin at home for hx of stroke  - PFO work up in past negative, no evidence of PFO on MIMI X2  - c/w ASA   - c/w heparin gtt  - Restart Statin when LFTs and CPK improve per neurology  - Being worked up for Mixed Connective Tissue Disease OP- f/u p-ANCA, c-ANCA/ [id number] negative  - Neuro recs appreciated    #Thecal sac flattening   - CT thoracic and lumbar spine showing degenerative disc disease T6-T7 through L4-L5 and mild disc bulges at L2-L3 through L4-L5 that flatten the ventral thecal sac and narrowing of the bilateral neural foramina  - Will Monitor for now, will consider neuro/neurosurg evaluation in the future    PULM  #Intubated  - Intubated 12/2, extubated 12/4 in MICU  - Re-intubated 12/5 am for airway protection iso seizure on floors during RRT  - Trend ABGs  - 12/5 CXR: ETT in place, no acute lung pathology  - CPAP starting 12/6    CARDIAC  #HLD  - atorvastatin on hold due to elevated CK and LFTs  - please restart when possible for secondary stroke prevention    #?ASD/PFO  - Patient reportedly found to have a PFO in February 2022 during a stroke workup at [place]. Patient presented for a PFO closure in November 2022. Notably, no PFO was found at the time and no intervention was performed by Dr. Lynn.    - TTE 11/5/22 EF 57% and grossly normal LV function  - MIMI performed bedside 12/5 1 of 2 studies (+) on bubble study (uploaded to [facility])    RENAL  #Rhabdomyolysis  - CK 8720 on admission, peaked at 15k, now downtrending  - DDx: prolonged downtime myocitis v hyperthermia? (T 105.4 F) v sepsis   - c/w aggressive IVFs  - Will trend CKs- still above 10,000  - BMP Q12H with Mg, Phos    GI   #Partial SBO  - CT chest, abdomen, and pelvis w/ contrast concerning for possible partial SBO without transition point.  - Surgery consulted, no acute intervention at this time  - NPO for now with NGT to low wall suction  - Monitor for BMs, monitor for abdominal distension  - f/u official results 12/5 KUB - wet read negative for ileus or SBO  - Diet, NPO on TF Jevity 1.5 to goal 65cc/hr    HEME/ONC  #Leukocytosis  - Concern for septic process  - Trend CBCs    ID  #Concern for sepsis  - Patient initially presented with AMS, T 105.4, tachycardic, and tachypneic   - UA negative  - s/p vanco and zosyn x1 in ED 12/2, ceftriaxone 2g BID 12/3-12/4, vanco 12/3-12/4  - BCx (12/2 NGTD repeat 12/5 NGTD) and UC 12/2 NGTD  - vanc 1G TID and ceftriaxone 2G BID for meningitis PPX 12/5   - 12/6 vanc 1G TID pending MRSA swab check and Zosyn 12/6 for ASP PNA coverage for persistent fevers      ENDO  - Concern of continued exogenous testosterone use contributing to hypercoagulation?  - Hold off on endo consult better history is obtained  - No acute issues    DVT  - Hep gtt ASSESSMENT & PLAN:	  52-year-old male with a PMHx significant for TIAs (2011, 2013), CVAs x3 (02/2022 s/p tPA, 8/3/2022 s/p tPA, 11/5/2022 with residual expressive aphasia) on Eliquis, and HLD BIBEMS after being found unresponsive at home on the floor, last known well 12/1 at around noon. C-collar was placed. Patient was intubated in the ED for airway protection. CTH with no acute findings, vEEG with no identified seizures. Patient was weaned off pressors, extubated, and transitioned to floors 12/4/22. 12/5 am RRT called for seizure like activity with urinary incontinence, tongue biting, and R>L posturing, patient s/p ativan 2mg x3. Anesthesia called to RRT for intubation. MICU accepting patient for seizure like activity requiring intubation.     NEURO  #AMS, Seizure like activity  - Patient found down and unresponsive at home on 12/2, last known well 12/1 at around noon. Intubated in ED 12/2, extubated in MICU 12/4  - RRT 12/5: seizure like acting with urinary incontinence, convulsions, and tongue biting; s/p ativan 2mg x3 with pauses in seizure activity following each; intubated for airway protection with rocuronium   - CTH and CT cervical spine 12/2 negative for any acute pathologies. Tox screen on admission negative. 12/5: Stable exam from prior [facility], chronic microvascular ischemic changes, parenchymal loss, dystrophic calcification of central portion of alexis unchanged.  - Video EEG 12/3-12/4 without any seizure like activity, moderate to severe nonspecific diffuse or multifocal cerebral dysfunction  - propofol off since 3am  - c/w keppra 1500mg TID maintenance, Keppra loaded 4.5g  - Pending MR brain for stroke evaluation, patient has loop recorder but MRI compatible per Radiology   - In progress: 24hr Video EEG placed on 12/5 - EEG read without any seizure activity, some slowing likely due to propofol   - f/u neuro recs   - f/u prolactin      #CVAs/TIAs  - Hx of TIAs in 2011 and 2013, CVAs x3 in 02/22, 8/22, and 11/22 with residual expressive aphasia   - on Eliquis and Aspirin at home for hx of stroke  - PFO work up in past negative, no evidence of PFO on MIMI X2  - c/w ASA   - c/w heparin gtt  - Restart Statin when LFTs and CPK improve per neurology  - Being worked up for Mixed Connective Tissue Disease OP- f/u p-ANCA, c-ANCA/ [id number] negative  - Neuro recs appreciated    #Thecal sac flattening   - CT thoracic and lumbar spine showing degenerative disc disease T6-T7 through L4-L5 and mild disc bulges at L2-L3 through L4-L5 that flatten the ventral thecal sac and narrowing of the bilateral neural foramina  - Will Monitor for now, will consider neuro/neurosurg evaluation in the future    PULM  #Intubated  - Intubated 12/2, extubated 12/4 in MICU  - Re-intubated 12/5 am for airway protection iso seizure on floors during RRT  - Trend ABGs  - 12/5 CXR: ETT in place, no acute lung pathology  - CPAP starting 12/6    CARDIAC  #HLD  - atorvastatin on hold due to elevated CK and LFTs  - please restart when possible for secondary stroke prevention    #?ASD/PFO  - Patient reportedly found to have a PFO in February 2022 during a stroke workup at [place]. Patient presented for a PFO closure in November 2022. Notably, no PFO was found at the time and no intervention was performed by Dr. Lynn.    - TTE 11/5/22 EF 57% and grossly normal LV function  - MIMI performed bedside 12/5 1 of 2 studies (+) on bubble study (uploaded to [facility])    RENAL  #Rhabdomyolysis  - CK 8720 on admission, peaked at 15k, now downtrending  - DDx: prolonged downtime myocitis v hyperthermia? (T 105.4 F) v sepsis   - c/w aggressive IVFs  - Continue checking CK daily  - BMP Q12H with Mg, Phos    GI   #Partial SBO - resolved  - CT chest, abdomen, and pelvis w/ contrast concerning for possible partial SBO without transition point.  - Surgery consulted, no acute intervention at this time  - 12/5 KUB - negative for ileus or SBO  - 12/5 started on TF Jevity 1.5 to goal 65cc/hr      HEME/ONC  #Leukocytosis  - Concern for septic process  - Trend CBCs    ID  #Concern for sepsis  - Patient initially presented with AMS, T 105.4, tachycardic, and tachypneic   - UA negative  - s/p vanco and zosyn x1 in ED 12/2, ceftriaxone 2g BID 12/3-12/4, vanco 12/3-12/4  - BCx (12/2 NGTD repeat 12/5 NGTD) and UC 12/2 NGTD  - vanc 1G TID and ceftriaxone 2G BID for meningitis PPX 12/5   - 12/6 vanc 1G TID pending MRSA swab check and Zosyn 12/6 for ASP PNA coverage for persistent fevers      ENDO  - Concern of continued exogenous testosterone use contributing to hypercoagulation?  - Hold off on endo consult better history is obtained  - No acute issues    DVT  - Hep gtt

## 2022-12-06 NOTE — CHART NOTE - NSCHARTNOTEFT_GEN_A_CORE
: Terrie Stone MD/Viv Quinonez MD    INDICATION: critical illness    PROCEDURE:  [x ] LIMITED ECHO  [ x] LIMITED CHEST  [ ] LIMITED RETROPERITONEAL  [x ] LIMITED ABDOMINAL  [ ] LIMITED DVT  [ ] NEEDLE GUIDANCE VASCULAR  [ ] NEEDLE GUIDANCE THORACENTESIS  [ ] NEEDLE GUIDANCE PARACENTESIS  [ ] NEEDLE GUIDANCE PERICARDIOCENTESIS  [ ] OTHER    FINDINGS:  Lungs: RUL A-lines, RLL consolidation, B-lines throughout upper and lower left lung fields   Heart: adequate LV contractility, RV < LV   IVC: unable to visualize due to patient body habitus     INTERPRETATION:  normal aeration pattern with R lung consolidation and fluid in left lung   grossly normal cardiac function : Terrie Stone MD/Viv Quinonez MD    INDICATION: acute hypoxemic respiratory failure     PROCEDURE:  [x ] LIMITED ECHO  [ x] LIMITED CHEST  [ ] LIMITED RETROPERITONEAL  [] LIMITED ABDOMINAL  [ ] LIMITED DVT  [ ] NEEDLE GUIDANCE VASCULAR  [ ] NEEDLE GUIDANCE THORACENTESIS  [ ] NEEDLE GUIDANCE PARACENTESIS  [ ] NEEDLE GUIDANCE PERICARDIOCENTESIS  [ ] OTHER    FINDINGS:  Lungs: RUL A-lines, RLL consolidation, B-lines throughout upper and lower left lung fields   Heart: adequate LV contractility, RV < LV   IVC: unable to visualize due to patient body habitus     INTERPRETATION:  normal aeration pattern with R lung consolidation and fluid in left lung   grossly normal cardiac function    Attending Attestation:  I was present during the key portions of the procedure and immediately available during the entire procedure.  Brittnee Burton MD  Attending  Pulmonary & Critical Care Medicine

## 2022-12-06 NOTE — DIETITIAN INITIAL EVALUATION ADULT - REASON FOR ADMISSION
53yo M w Hx of CVAs/TIAs found unresponsive at home and BIBEMS, intubated in ED (12/2), extubated (12/4) in MICU.  S/p RRT (12/5) for AMS and hypoxia & re-intubated.   Also with rhabdomyolysis & partial SBO.

## 2022-12-06 NOTE — EEG REPORT - NS EEG TEXT BOX
TAMI DUNHAM [id number]     Study Date: 		12/05/22 at 09:58 - 12/06/22 at 08:00  Duration x Hours:           22 hours 02 minutes  --------------------------------------------------------------------------------------------------  History:  CC/ HPI Patient is a 52y old  Male who presents with a chief complaint of Unresponsive (05 Dec 2022 12:23)    MEDICATIONS  (STANDING):  aspirin enteric coated 81 milliGRAM(s) Oral daily  cefTRIAXone   IVPB 2000 milliGRAM(s) IV Intermittent every 12 hours  chlorhexidine 0.12% Liquid 15 milliLiter(s) Oral Mucosa every 12 hours  chlorhexidine 2% Cloths 1 Application(s) Topical daily  chlorhexidine 4% Liquid 1 Application(s) Topical <User Schedule>  diphtheria/tetanus/pertussis (acellular) Vaccine (Adacel) 0.5 milliLiter(s) IntraMuscular once  heparin  Infusion. 1300 Unit(s)/Hr (13 mL/Hr) IV Continuous <Continuous>  lactated ringers. 1000 milliLiter(s) (150 mL/Hr) IV Continuous <Continuous>  levETIRAcetam  IVPB 1500 milliGRAM(s) IV Intermittent every 12 hours  propofol Infusion 20 MICROgram(s)/kG/Min (11.5 mL/Hr) IV Continuous <Continuous>  vancomycin  IVPB 1000 milliGRAM(s) IV Intermittent every 8 hours    --------------------------------------------------------------------------------------------------  Study Interpretation:    [[[Abbreviation Key:  PDR=alpha rhythm/posterior dominant rhythm. A-P=anterior posterior.  Amplitude: ‘very low’:<20; ‘low’:20-49; ‘medium’:; ‘high’:>150uV.  Persistence for periodic/rhythmic patterns (% of epoch) ‘rare’:<1%; ‘occasional’:1-10%; ‘frequent’:10-50%; ‘abundant’:50-90%; ‘continuous’:>90%.  Persistence for sporadic discharges: ‘rare’:<1/hr; ‘occasional’:1/min-1/hr; ‘frequent’:>1/min; ‘abundant’:>1/10 sec.  RPP=rhythmic and periodic patterns; GRDA=generalized rhythmic delta activity; FIRDA=frontal intermittent GRDA; LRDA=lateralized rhythmic delta activity; TIRDA=temporal intermittent rhythmic delta activity;  LPD=PLED=lateralized periodic discharges; GPD=generalized periodic discharges; BIPDs =bilateral independent periodic discharges; Mf=multifocal; SIRPDs=stimulus induced rhythmic, periodic, or ictal appearing discharges; BIRDs=brief potentially ictal rhythmic discharges >4 Hz, lasting .5-10s; PFA (paroxysmal bursts >13 Hz or =8 Hz <10s).  Modifiers: +F=with fast component; +S=with spike component; +R=with rhythmic component.  S-B=burst suppression pattern.  Max=maximal. N1-drowsy; N2-stage II sleep; N3-slow wave sleep. SSS/BETS=small sharp spikes/benign epileptiform transients of sleep. HV=hyperventilation; PS=photic stimulation]]]    Daily EEG Visual Analysis    FINDINGS:      Background:  Continuity: diffusely suppressed (< 10 uV) initially, later diffusely attenuated (< 20 uV)  Symmetry: asymmetric  Posterior dominant rhythm (PDR): None  State change: Present  Voltage: abnormal  Anterior Posterior Gradient: absent  Other background findings: The predominant background consists of diffuse suppression (<10-uV), with intermittent polymorphic delta frequencies at <= 0.5 Hz. There are rare bursts/runs of left frontal or diffuse theta frequencies, which on one occasion are associated with diffuse muscle artifact. There are rare intermittent beta frequencies. Later in the course of recording, the background is diffusely attenuated (<20 uV) with increased prevalence of intermittent beta frequencies. Later, the background is discontinuous, with 5-10-second bursts of faster polymorphic or semi-rhythmic delta frequencies at 1.5-2 Hz between periods of suppression. Duration of bursts gradually lengthen and periods of suppression gradually shorten in the morning of 12/06/22. The right hemisphere is of lower voltage than the left.  Breach: absent    Background Slowing:  Generalized slowing: severe  Focal slowing: right hemispheric relative attenuation/suppression    State Changes:   As above. No normal sleep architecture is captured.    Sporadic Epileptiform Discharges:    None    Rhythmic and Periodic Patterns (RPPs):  None     Electrographic and Electroclinical seizures:  None    Other Clinical Events:  None    Activation Procedures:   -Hyperventilation was not performed.    -Photic stimulation was not performed.     Artifacts:  Occasional intermittent myogenic and movement artifacts were noted.    EKG:  Single-lead EKG shows regular rhythm at .    EEG Classification / Summary:  Abnormal EEG in a comatose patient due to diffuse suppression gradually improving to discontinuous background, with right hemispheric relative attenuation/suppression. No epileptiform abnormalities or seizures are captured.    Clinical Impression:  Severe diffuse cerebral dysfunction that gradually improves is nonspecific in etiology. In this case, it may be related to sedating medication (propofol) with improvement after decreasing and stopping the medication.      -------------------------------------------------------------------------------------------------------  [facility] EEG Reading Room Ph#: [phone]  Epilepsy Answering Service after 5PM and before 8:30AM: Ph#: [phone]    Kylah Powell MD  Attending Physician, [facility]   TAMI DUNHAM [id number]     Study Date: 		12/05/22 at 09:58 - 12/06/22 at 08:00  Duration x Hours:           22 hours 02 minutes  --------------------------------------------------------------------------------------------------  History:  CC/ HPI Patient is a 52y old  Male who presents with a chief complaint of Unresponsive (05 Dec 2022 12:23)    MEDICATIONS  (STANDING):  aspirin enteric coated 81 milliGRAM(s) Oral daily  cefTRIAXone   IVPB 2000 milliGRAM(s) IV Intermittent every 12 hours  chlorhexidine 0.12% Liquid 15 milliLiter(s) Oral Mucosa every 12 hours  chlorhexidine 2% Cloths 1 Application(s) Topical daily  chlorhexidine 4% Liquid 1 Application(s) Topical <User Schedule>  diphtheria/tetanus/pertussis (acellular) Vaccine (Adacel) 0.5 milliLiter(s) IntraMuscular once  heparin  Infusion. 1300 Unit(s)/Hr (13 mL/Hr) IV Continuous <Continuous>  lactated ringers. 1000 milliLiter(s) (150 mL/Hr) IV Continuous <Continuous>  levETIRAcetam  IVPB 1500 milliGRAM(s) IV Intermittent every 12 hours  propofol Infusion 20 MICROgram(s)/kG/Min (11.5 mL/Hr) IV Continuous <Continuous>  vancomycin  IVPB 1000 milliGRAM(s) IV Intermittent every 8 hours    --------------------------------------------------------------------------------------------------  Study Interpretation:    [[[Abbreviation Key:  PDR=alpha rhythm/posterior dominant rhythm. A-P=anterior posterior.  Amplitude: ‘very low’:<20; ‘low’:20-49; ‘medium’:; ‘high’:>150uV.  Persistence for periodic/rhythmic patterns (% of epoch) ‘rare’:<1%; ‘occasional’:1-10%; ‘frequent’:10-50%; ‘abundant’:50-90%; ‘continuous’:>90%.  Persistence for sporadic discharges: ‘rare’:<1/hr; ‘occasional’:1/min-1/hr; ‘frequent’:>1/min; ‘abundant’:>1/10 sec.  RPP=rhythmic and periodic patterns; GRDA=generalized rhythmic delta activity; FIRDA=frontal intermittent GRDA; LRDA=lateralized rhythmic delta activity; TIRDA=temporal intermittent rhythmic delta activity;  LPD=PLED=lateralized periodic discharges; GPD=generalized periodic discharges; BIPDs =bilateral independent periodic discharges; Mf=multifocal; SIRPDs=stimulus induced rhythmic, periodic, or ictal appearing discharges; BIRDs=brief potentially ictal rhythmic discharges >4 Hz, lasting .5-10s; PFA (paroxysmal bursts >13 Hz or =8 Hz <10s).  Modifiers: +F=with fast component; +S=with spike component; +R=with rhythmic component.  S-B=burst suppression pattern.  Max=maximal. N1-drowsy; N2-stage II sleep; N3-slow wave sleep. SSS/BETS=small sharp spikes/benign epileptiform transients of sleep. HV=hyperventilation; PS=photic stimulation]]]    Daily EEG Visual Analysis    FINDINGS:      Background:  Continuity: diffusely suppressed (< 10 uV) initially, later diffusely attenuated (< 20 uV)  Symmetry: asymmetric  Posterior dominant rhythm (PDR): None  State change: Present  Voltage: abnormal  Anterior Posterior Gradient: absent  Other background findings: The predominant background consists of diffuse suppression (<10-uV), with intermittent polymorphic delta frequencies at <= 0.5 Hz. There are rare bursts/runs of left frontal or diffuse theta frequencies, which on one occasion are associated with diffuse muscle artifact. There are rare intermittent beta frequencies. Later in the course of recording, the background is diffusely attenuated (<20 uV) with increased prevalence of intermittent beta frequencies. Later, the background is discontinuous, with 5-10-second bursts of faster polymorphic or semi-rhythmic delta frequencies at 1.5-2 Hz between periods of suppression. Duration of bursts gradually lengthen and periods of suppression gradually shorten in the morning of 12/06/22. The right hemisphere is of lower voltage than the left.  Breach: absent    Background Slowing:  Generalized slowing: severe  Focal slowing: right hemispheric relative attenuation/suppression    State Changes:   As above. No normal sleep architecture is captured.    Sporadic Epileptiform Discharges:    None    Rhythmic and Periodic Patterns (RPPs):  None     Electrographic and Electroclinical seizures:  None    Other Clinical Events:  None    Activation Procedures:   -Hyperventilation was not performed.    -Photic stimulation was not performed.     Artifacts:  Occasional intermittent myogenic and movement artifacts were noted.    EKG:  Single-lead EKG shows regular rhythm at .    EEG Classification / Summary:  Abnormal EEG in a comatose patient due to diffuse suppression gradually improving to discontinuous background, with right hemispheric relative attenuation/suppression. No epileptiform abnormalities or seizures are captured.    Clinical Impression:  Severe diffuse cerebral dysfunction that gradually improves is nonspecific in etiology. In this case, it may be related to sedating medication (propofol) with improvement after decreasing and stopping the medication.  Right hemispheric focal cerebral dysfunction can be structural or functional in etiology.      -------------------------------------------------------------------------------------------------------  [facility] EEG Reading Room Ph#: [phone]  Epilepsy Answering Service after 5PM and before 8:30AM: Ph#: [phone]    Kylah Powell MD  Attending Physician, [facility]

## 2022-12-07 LAB
ALBUMIN SERPL ELPH-MCNC: 2.9 G/DL — LOW (ref 3.3–5)
ALP SERPL-CCNC: 61 U/L — SIGNIFICANT CHANGE UP (ref 40–120)
ALT FLD-CCNC: 68 U/L — HIGH (ref 4–41)
ANION GAP SERPL CALC-SCNC: 4 MMOL/L — LOW (ref 7–14)
APTT BLD: 60.3 SEC — HIGH (ref 27–36.3)
AST SERPL-CCNC: 101 U/L — HIGH (ref 4–40)
B PERT DNA SPEC QL NAA+PROBE: SIGNIFICANT CHANGE UP
B PERT+PARAPERT DNA PNL SPEC NAA+PROBE: SIGNIFICANT CHANGE UP
BILIRUB SERPL-MCNC: 0.6 MG/DL — SIGNIFICANT CHANGE UP (ref 0.2–1.2)
BORDETELLA PARAPERTUSSIS (RAPRVP): SIGNIFICANT CHANGE UP
BUN SERPL-MCNC: 10 MG/DL — SIGNIFICANT CHANGE UP (ref 7–23)
C PNEUM DNA SPEC QL NAA+PROBE: SIGNIFICANT CHANGE UP
CALCIUM SERPL-MCNC: 8.1 MG/DL — LOW (ref 8.4–10.5)
CHLORIDE SERPL-SCNC: 105 MMOL/L — SIGNIFICANT CHANGE UP (ref 98–107)
CK SERPL-CCNC: 2704 U/L — HIGH (ref 30–200)
CO2 SERPL-SCNC: 28 MMOL/L — SIGNIFICANT CHANGE UP (ref 22–31)
CREAT SERPL-MCNC: 0.81 MG/DL — SIGNIFICANT CHANGE UP (ref 0.5–1.3)
EGFR: 106 ML/MIN/1.73M2 — SIGNIFICANT CHANGE UP
FLUAV SUBTYP SPEC NAA+PROBE: SIGNIFICANT CHANGE UP
FLUBV RNA SPEC QL NAA+PROBE: SIGNIFICANT CHANGE UP
GLUCOSE SERPL-MCNC: 131 MG/DL — HIGH (ref 70–99)
HADV DNA SPEC QL NAA+PROBE: SIGNIFICANT CHANGE UP
HCOV 229E RNA SPEC QL NAA+PROBE: SIGNIFICANT CHANGE UP
HCOV HKU1 RNA SPEC QL NAA+PROBE: SIGNIFICANT CHANGE UP
HCOV NL63 RNA SPEC QL NAA+PROBE: SIGNIFICANT CHANGE UP
HCOV OC43 RNA SPEC QL NAA+PROBE: SIGNIFICANT CHANGE UP
HCT VFR BLD CALC: 28.7 % — LOW (ref 39–50)
HGB BLD-MCNC: 9.7 G/DL — LOW (ref 13–17)
HMPV RNA SPEC QL NAA+PROBE: SIGNIFICANT CHANGE UP
HPIV1 RNA SPEC QL NAA+PROBE: SIGNIFICANT CHANGE UP
HPIV2 RNA SPEC QL NAA+PROBE: SIGNIFICANT CHANGE UP
HPIV3 RNA SPEC QL NAA+PROBE: SIGNIFICANT CHANGE UP
HPIV4 RNA SPEC QL NAA+PROBE: SIGNIFICANT CHANGE UP
M PNEUMO DNA SPEC QL NAA+PROBE: SIGNIFICANT CHANGE UP
MCHC RBC-ENTMCNC: 29.3 PG — SIGNIFICANT CHANGE UP (ref 27–34)
MCHC RBC-ENTMCNC: 33.8 GM/DL — SIGNIFICANT CHANGE UP (ref 32–36)
MCV RBC AUTO: 86.7 FL — SIGNIFICANT CHANGE UP (ref 80–100)
NRBC # BLD: 0 /100 WBCS — SIGNIFICANT CHANGE UP (ref 0–0)
NRBC # FLD: 0 K/UL — SIGNIFICANT CHANGE UP (ref 0–0)
PLATELET # BLD AUTO: 148 K/UL — LOW (ref 150–400)
POTASSIUM SERPL-MCNC: 3.6 MMOL/L — SIGNIFICANT CHANGE UP (ref 3.5–5.3)
POTASSIUM SERPL-SCNC: 3.6 MMOL/L — SIGNIFICANT CHANGE UP (ref 3.5–5.3)
PROT SERPL-MCNC: 5.8 G/DL — LOW (ref 6–8.3)
RAPID RVP RESULT: SIGNIFICANT CHANGE UP
RBC # BLD: 3.31 M/UL — LOW (ref 4.2–5.8)
RBC # FLD: 13.5 % — SIGNIFICANT CHANGE UP (ref 10.3–14.5)
RSV RNA SPEC QL NAA+PROBE: SIGNIFICANT CHANGE UP
RV+EV RNA SPEC QL NAA+PROBE: SIGNIFICANT CHANGE UP
SARS-COV-2 RNA SPEC QL NAA+PROBE: SIGNIFICANT CHANGE UP
SODIUM SERPL-SCNC: 137 MMOL/L — SIGNIFICANT CHANGE UP (ref 135–145)
WBC # BLD: 9.43 K/UL — SIGNIFICANT CHANGE UP (ref 3.8–10.5)
WBC # FLD AUTO: 9.43 K/UL — SIGNIFICANT CHANGE UP (ref 3.8–10.5)

## 2022-12-07 PROCEDURE — 76604 US EXAM CHEST: CPT | Mod: 26,GC,59

## 2022-12-07 PROCEDURE — 95720 EEG PHY/QHP EA INCR W/VEEG: CPT

## 2022-12-07 PROCEDURE — 99291 CRITICAL CARE FIRST HOUR: CPT | Mod: GC,25

## 2022-12-07 PROCEDURE — 93306 TTE W/DOPPLER COMPLETE: CPT | Mod: 26,GC

## 2022-12-07 RX ORDER — LACOSAMIDE 50 MG/1
100 TABLET ORAL
Refills: 0 | Status: DISCONTINUED | OUTPATIENT
Start: 2022-12-07 | End: 2022-12-08

## 2022-12-07 RX ORDER — LACOSAMIDE 50 MG/1
100 TABLET ORAL
Refills: 0 | Status: DISCONTINUED | OUTPATIENT
Start: 2022-12-07 | End: 2022-12-07

## 2022-12-07 RX ORDER — ASPIRIN/CALCIUM CARB/MAGNESIUM 324 MG
81 TABLET ORAL DAILY
Refills: 0 | Status: DISCONTINUED | OUTPATIENT
Start: 2022-12-07 | End: 2022-12-13

## 2022-12-07 RX ADMIN — LACOSAMIDE 100 MILLIGRAM(S): 50 TABLET ORAL at 12:08

## 2022-12-07 RX ADMIN — CHLORHEXIDINE GLUCONATE 1 APPLICATION(S): 213 SOLUTION TOPICAL at 12:09

## 2022-12-07 RX ADMIN — LEVETIRACETAM 400 MILLIGRAM(S): 250 TABLET, FILM COATED ORAL at 12:06

## 2022-12-07 RX ADMIN — CHLORHEXIDINE GLUCONATE 15 MILLILITER(S): 213 SOLUTION TOPICAL at 05:09

## 2022-12-07 RX ADMIN — Medication 1 APPLICATION(S): at 05:09

## 2022-12-07 RX ADMIN — LEVETIRACETAM 400 MILLIGRAM(S): 250 TABLET, FILM COATED ORAL at 23:30

## 2022-12-07 RX ADMIN — PIPERACILLIN AND TAZOBACTAM 25 GRAM(S): 4; .5 INJECTION, POWDER, LYOPHILIZED, FOR SOLUTION INTRAVENOUS at 22:03

## 2022-12-07 RX ADMIN — PIPERACILLIN AND TAZOBACTAM 25 GRAM(S): 4; .5 INJECTION, POWDER, LYOPHILIZED, FOR SOLUTION INTRAVENOUS at 14:07

## 2022-12-07 RX ADMIN — PIPERACILLIN AND TAZOBACTAM 25 GRAM(S): 4; .5 INJECTION, POWDER, LYOPHILIZED, FOR SOLUTION INTRAVENOUS at 05:08

## 2022-12-07 RX ADMIN — CHLORHEXIDINE GLUCONATE 15 MILLILITER(S): 213 SOLUTION TOPICAL at 18:09

## 2022-12-07 RX ADMIN — Medication 1 APPLICATION(S): at 18:11

## 2022-12-07 RX ADMIN — Medication 166.67 MILLIGRAM(S): at 06:04

## 2022-12-07 RX ADMIN — Medication 81 MILLIGRAM(S): at 12:28

## 2022-12-07 NOTE — CHART NOTE - NSCHARTNOTEFT_GEN_A_CORE
: Werner Araiza/ Viv Oreilly    INDICATION:Critical Illness     PROCEDURE:  [ X] LIMITED ECHO  [X ] LIMITED CHEST  [ ] LIMITED RETROPERITONEAL  [ ] LIMITED ABDOMINAL  [ ] LIMITED DVT  [ ] NEEDLE GUIDANCE VASCULAR  [ ] NEEDLE GUIDANCE THORACENTESIS  [ ] NEEDLE GUIDANCE PARACENTESIS  [ ] NEEDLE GUIDANCE PERICARDIOCENTESIS  [ ] OTHER    FINDINGS:  Scattered B lines at bases  Lung sliding bilaterally  RV<LV  IVC 1.7cm     INTERPRETATION:    Scattered B lines at bases question a component of pulmonary edema. : Werner Araiza/ Viv Oreilly    INDICATION:Critical Illness     PROCEDURE:  [ X] LIMITED ECHO  [X ] LIMITED CHEST  [ ] LIMITED RETROPERITONEAL  [ ] LIMITED ABDOMINAL  [ ] LIMITED DVT  [ ] NEEDLE GUIDANCE VASCULAR  [ ] NEEDLE GUIDANCE THORACENTESIS  [ ] NEEDLE GUIDANCE PARACENTESIS  [ ] NEEDLE GUIDANCE PERICARDIOCENTESIS  [ ] OTHER    FINDINGS:  Scattered B lines at bases  Lung sliding bilaterally  RV<LV  IVC 1.7cm     INTERPRETATION:    Scattered B lines at bases question a component of pulmonary edema.    Attending Addendum:  At bedside for procedure and agree with above.  RENA Daley DO, FCCP

## 2022-12-07 NOTE — PROGRESS NOTE ADULT - SUBJECTIVE AND OBJECTIVE BOX
Neurology Progress Note    S: Patient seen and examined intubated on sedation in ICU ; eeg in progrses     Medication:  MEDICATIONS  (STANDING):  aspirin  chewable 81 milliGRAM(s) Oral daily  chlorhexidine 0.12% Liquid 15 milliLiter(s) Oral Mucosa every 12 hours  chlorhexidine 2% Cloths 1 Application(s) Topical daily  diphtheria/tetanus/pertussis (acellular) Vaccine (Adacel) 0.5 milliLiter(s) IntraMuscular once  heparin  Infusion. 1300 Unit(s)/Hr (13 mL/Hr) IV Continuous <Continuous>  lacosamide 100 milliGRAM(s) Oral two times a day  levETIRAcetam  IVPB 1500 milliGRAM(s) IV Intermittent every 12 hours  petrolatum Ophthalmic Ointment 1 Application(s) Both EYES two times a day  piperacillin/tazobactam IVPB.. 3.375 Gram(s) IV Intermittent every 8 hours    MEDICATIONS  (PRN):  aluminum hydroxide/magnesium hydroxide/simethicone Suspension 30 milliLiter(s) Oral every 4 hours PRN Dyspepsia  heparin   Injectable 8000 Unit(s) IV Push every 6 hours PRN For aPTT less than 40  heparin   Injectable 4000 Unit(s) IV Push every 6 hours PRN For aPTT between 40 - 57    Vitals:    Vital Signs Last 24 Hrs    Vital Signs Last 24 Hrs  T(C): 37.6 (12-07-22 @ 12:00), Max: 38 (12-07-22 @ 00:00)  T(F): 99.6 (12-07-22 @ 12:00), Max: 100.4 (12-07-22 @ 00:00)  HR: 73 (12-07-22 @ 12:00) (67 - 91)  BP: 129/82 (12-07-22 @ 12:00) (123/80 - 148/100)  BP(mean): 96 (12-07-22 @ 12:00) (91 - 116)  RR: 17 (12-07-22 @ 12:00) (15 - 19)  SpO2: 99% (12-07-22 @ 12:00) (87% - 100%)            General Exam:   General Appearance: Appropriately dressed and in no acute distress       Head: Normocephalic, atraumatic and no dysmorphic features  Ear, Nose, and Throat: Moist mucous membranes +ETT   CVS: S1S2+  Resp: No SOB, no wheeze or rhonchi  Abd: soft NTND  Extremities: No edema, no cyanosis  Skin: No bruises, no rashes     Neurological Exam: (   Mental Status: intubated, sedated, no verbal, following slightly on R  Cranial Nerves: PERRL, EOMI, VFFC, sensation V1-V3 intact,  no obvious facial asymmetry  + corneals, + gag    Motor: Moving uppers R>L   Sensation:minimal withdrawal to noxious x 4  R>L   Reflexes: 1+ throughout at biceps, brachioradialis, triceps, patellars and ankles bilaterally and equal. No clonus. R toe and L toe were both downgoing.  Coordination: unable   Gait: unable     I personally reviewed the below data/images/labs:      CBC Full  -  ( 07 Dec 2022 02:55 )  WBC Count : 9.43 K/uL  RBC Count : 3.31 M/uL  Hemoglobin : 9.7 g/dL  Hematocrit : 28.7 %  Platelet Count - Automated : 148 K/uL  Mean Cell Volume : 86.7 fL  Mean Cell Hemoglobin : 29.3 pg  Mean Cell Hemoglobin Concentration : 33.8 gm/dL  Auto Neutrophil # : x  Auto Lymphocyte # : x  Auto Monocyte # : x  Auto Eosinophil # : x  Auto Basophil # : x  Auto Neutrophil % : x  Auto Lymphocyte % : x  Auto Monocyte % : x  Auto Eosinophil % : x  Auto Basophil % : x    12-07    137  |  105  |  10  ----------------------------<  131<H>  3.6   |  28  |  0.81    Ca    8.1<L>      07 Dec 2022 02:55  Phos  3.9     12-06  Mg     2.40     12-06    TPro  5.8<L>  /  Alb  2.9<L>  /  TBili  0.6  /  DBili  x   /  AST  101<H>  /  ALT  68<H>  /  AlkPhos  61  12-07        < from: CT Head No Cont (12.02.22 @ 20:27) >    ACC: [id number] EXAM:  CT CERVICAL SPINE                        ACC: [id number] EXAM:  CT MAXILLOFACIAL                        ACC: [id number] EXAM:  CT BRAIN                          PROCEDURE DATE:  12/02/2022        INTERPRETATION:  CLINICAL INDICATION: Trauma.    Technique: Noncontrast axial CT of the head, facial bones, and cervical   spine was performed. 3-D reformats of the facial bones was obtained.   Coronal and sagittal reformats were obtained.      COMPARISON: None.    FINDINGS:  Head CT:  The ventricles and sulci are within normal limits. 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 There is no intraparenchymal hematoma, mass effect   or midline shift. No abnormal extra-axial fluid collections or   hemorrhages are present.    There is swelling of the left lateral scalp. The calvarium is intact.   There are scattered mucosal inflammatory changes in the paranasal sinuses.      Cervical spine CT:  Alignment ismaintained. Vertebral bodies are normal in height, without   evidence of fracture or dislocation. Prevertebral soft tissues are within   normal limits without soft tissue swelling or hematoma.    Intervertebral discs are intact. Neural foramina and spinal canal are   intact.    The visualized lung apices are within normal limits.      Facial bone CT:    No acute facial fracture.    There are scattered mucosal inflammatory changes in the paranasal   sinuses. Mastoid air cells are clear.    Soft tissues appear unremarkable.        IMPRESSION:  CT HEAD: No acute abnormality.  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  CT CERVICAL SPINE: No acute abnormality  CT FACIAL BONE: No acute abnormality    --- End of Report ---       DELGADO IVAN MD; Attending Radiologist  This document has been electronically signed. Dec  2 2022  9:02PM    < end of copied text >  < from: CT Lumbar Spine No Cont (12.02.22 @ 20:27) >    ACC: [id number] EXAM:  CT LUMBAR SPINE                        ACC: [id number] EXAM:  CT THORACIC SPINE                          PROCEDURE DATE:  12/02/2022          INTERPRETATION:  CT thoracic and lumbar spine without IV contrast    CLINICAL INFORMATION:  Trauma  Back pain, fracture.    TECHNIQUE:  Contiguous axial 2 mm sections were obtained through the   thoracic and lumbar spine using a single helical acquisition.     Additional 2 mm sagittal and coronal reconstructions of the spine were   obtained. These additional reformatted images were used to evaluate the   spine for alignment, vertebral fractures and the integrity of the the   posterior elements.   This scan was performed using automatic exposure   control (radiation dose reduction software) to obtain a diagnostic image   quality scan with patient dose as low as reasonably achievable.    FINDINGS:   No prior similar studies are available for review    Thoracic and lumbar vertebral body heights are maintained. No vertebral   fracture is seen. No destructive bone lesion is found.  Alignment is   preserved.  Facet joints appear intact and aligned.    Thoracic and lumbar intervertebral disc spaces appear intact.   Degenerative disc disease and spondylosis is noted at T6-T7 throughL4-5   with loss of disc height and associated degenerative endplate changes.   Mild disc bulges at L2-3 through L4-5 flatten the ventral thecal sac and   narrow the BILATERAL neural foramina.    No paraspinal mass is recognized.  Paraspinal soft tissues appear intact.      IMPRESSION:  Degenerative disc disease and spondylosis is noted at T6-T7   through L4-5 with loss of disc height and associated degenerative   endplate changes. Mild disc bulges at L2-3 through L4-5 flatten the   ventral thecal sac and narrow the BILATERAL neural foramina   No   vertebral fracture is recognized.    --- End of Report ---       ANGIE ESCOBAR MD; Attending Radiologist  This document has been electronically signed. Dec  2 2022  8:55PM    < end of copied text >    EEG Classification / Summary:  Abnormal EEG in a comatose patient due to diffuse suppression gradually improving to discontinuous background, with right hemispheric relative attenuation/suppression. No epileptiform abnormalities or seizures are captured.    Clinical Impression:  Severe diffuse cerebral dysfunction that gradually improves is nonspecific in etiology. In this case, it may be related to sedating medication (propofol) with improvement after decreasing and stopping the medication.  Right hemispheric focal cerebral dysfunction can be structural or functional in etiology.      12/7  Clinical Impression:  -Occasional runs of right frontal lateralized periodic discharges (LPDs) at up to 1.3 Hz indicate a potentially epileptogenic focus in the right frontal region and are on the ictal-interictal continuum.  -A pattern on the ictal-interictal continuum is a pattern that does not qualify as an electrographic seizure or electrographic status epilepticus, but there is a reasonable chance that it may be contributing to impaired alertness, causing other clinical symptoms, and/or contributing to neuronal injury.  -Right hemispheric relative attenuation indicates right hemispheric focal cerebral dysfunction, which can be structural or functional in etiology.  -Rare left frontal epileptiform discharges indicate a potentially epileptogenic focus in this reigon  -Severe diffuse slowing and GRDA indicate severe diffuse cerebral dysfunction of nonspecific etiology.  -No electrographic seizures are captured.

## 2022-12-07 NOTE — EEG REPORT - NS EEG TEXT BOX
TAMI DUNHAM [id number]     Study Date: 		12/06/22 at 08:00 - 12/07/22 at 08:00  Duration x Hours:           24 hours  --------------------------------------------------------------------------------------------------  History:  CC/ HPI Patient is a 52y old  Male who presents with a chief complaint of Unresponsive (07 Dec 2022 07:17)    MEDICATIONS  (STANDING):  aspirin enteric coated 81 milliGRAM(s) Oral daily  chlorhexidine 0.12% Liquid 15 milliLiter(s) Oral Mucosa every 12 hours  chlorhexidine 2% Cloths 1 Application(s) Topical daily  diphtheria/tetanus/pertussis (acellular) Vaccine (Adacel) 0.5 milliLiter(s) IntraMuscular once  heparin  Infusion. 1300 Unit(s)/Hr (13 mL/Hr) IV Continuous <Continuous>  lactated ringers. 1000 milliLiter(s) (75 mL/Hr) IV Continuous <Continuous>  levETIRAcetam  IVPB 1500 milliGRAM(s) IV Intermittent every 12 hours  petrolatum Ophthalmic Ointment 1 Application(s) Both EYES two times a day  piperacillin/tazobactam IVPB.. 3.375 Gram(s) IV Intermittent every 8 hours  vancomycin  IVPB 1250 milliGRAM(s) IV Intermittent every 8 hours    --------------------------------------------------------------------------------------------------  Study Interpretation:    [[[Abbreviation Key:  PDR=alpha rhythm/posterior dominant rhythm. A-P=anterior posterior.  Amplitude: ‘very low’:<20; ‘low’:20-49; ‘medium’:; ‘high’:>150uV.  Persistence for periodic/rhythmic patterns (% of epoch) ‘rare’:<1%; ‘occasional’:1-10%; ‘frequent’:10-50%; ‘abundant’:50-90%; ‘continuous’:>90%.  Persistence for sporadic discharges: ‘rare’:<1/hr; ‘occasional’:1/min-1/hr; ‘frequent’:>1/min; ‘abundant’:>1/10 sec.  RPP=rhythmic and periodic patterns; GRDA=generalized rhythmic delta activity; FIRDA=frontal intermittent GRDA; LRDA=lateralized rhythmic delta activity; TIRDA=temporal intermittent rhythmic delta activity;  LPD=PLED=lateralized periodic discharges; GPD=generalized periodic discharges; BIPDs =bilateral independent periodic discharges; Mf=multifocal; SIRPDs=stimulus induced rhythmic, periodic, or ictal appearing discharges; BIRDs=brief potentially ictal rhythmic discharges >4 Hz, lasting .5-10s; PFA (paroxysmal bursts >13 Hz or =8 Hz <10s).  Modifiers: +F=with fast component; +S=with spike component; +R=with rhythmic component.  S-B=burst suppression pattern.  Max=maximal. N1-drowsy; N2-stage II sleep; N3-slow wave sleep. SSS/BETS=small sharp spikes/benign epileptiform transients of sleep. HV=hyperventilation; PS=photic stimulation]]]    Daily EEG Visual Analysis    FINDINGS:      Background:  Continuity: discontinuous (approximately 30% suppressed <10 uV or attenuated < 20 uV)  Symmetry: asymmetric  Posterior dominant rhythm (PDR): none  State change: absent  Voltage: normal, mostly 20-150uV  Anterior Posterior Gradient: absent  Other background findings: The predominant background consists of diffuse polymorphic delta frequencies with rare intermittent theta >> beta frequencies, occurring in bursts/runs lasting 1-20 seconds between periods of suppression or attenuation lasting 1-5 seconds. There are occasional bursts of frontally predominant generalized rhythmic delta activity (GRDA), more prominent on the left.  Breach: absent    Background Slowing:  Generalized slowing:   Focal slowing: Occasional right hemispheric relative attenuation    State Changes:   No normal sleep architecture is captured.    Sporadic Epileptiform Discharges and Rhythmic and Periodic Patterns (RPPs):    -In the afternoon of 12/06/22, there appear occasional right frontal (Fp2) bluntly contoured sharp waves, often periodic at 0.3 Hz. Overnight, these appear as occasional runs of right frontal (Fp2/F4) lateralized periodic discharges at 1.3 Hz without clear evolution and without clinical change on video.  -Rare left frontal (F3) very low- to low-amplitude bluntly contoured sharp waves    Electrographic and Electroclinical seizures:  None    Other Clinical Events:  None    Activation Procedures:   -Hyperventilation was not performed.    -Photic stimulation was not performed.    Artifacts:  Intermittent myogenic and movement artifacts were noted.    EKG:  Single-lead EKG shows regular rhythm at 70-90 bpm.    EEG Classification / Summary:  Abnormal EEG in a comatose patient due to:  -Occasional right frontal sharp waves, at times appearing as occasional runs of lateralized periodic discharges (LPDs) at up to 1.3 Hz  -Occasional right hemispheric relative attenuation  -Rare left frontal very low- to low-amplitude bluntly contoured sharp waves  -Severe diffuse slowing and occasional bursts of frontally predominant generalized rhythmic delta activity (GRDA)    Clinical Impression:  -Occasional runs of right frontal lateralized periodic discharges (LPDs) at up to 1.3 Hz indicate a potentially epileptogenic focus in the right frontal region and are on the ictal-interictal continuum.  -A pattern on the ictal-interictal continuum is a pattern that does not qualify as an electrographic seizure or electrographic status epilepticus, but there is a reasonable chance that it may be contributing to impaired alertness, causing other clinical symptoms, and/or contributing to neuronal injury.  -Right hemispheric relative attenuation indicates right hemispheric focal cerebral dysfunction, which can be structural or functional in etiology.  -Rare left frontal epileptiform discharges indicate a potentially epileptogenic focus in this reigon  -Severe diffuse slowing and GRDA indicate severe diffuse cerebral dysfunction of nonspecific etiology.  -No electrographic seizures are captured.        -------------------------------------------------------------------------------------------------------  [facility] EEG Reading Room Ph#: [phone]  Epilepsy Answering Service after 5PM and before 8:30AM: Ph#: [phone]    Kylah Powell MD  Attending Physician, [facility] Epilepsy [place]   TAMI DUNHAM [id number]     Study Date: 		12/06/22 at 08:00 - 12/07/22 at 08:00  Duration x Hours:           24 hours  --------------------------------------------------------------------------------------------------  History:  CC/ HPI Patient is a 52y old  Male who presents with a chief complaint of Unresponsive (07 Dec 2022 07:17)    MEDICATIONS  (STANDING):  aspirin enteric coated 81 milliGRAM(s) Oral daily  chlorhexidine 0.12% Liquid 15 milliLiter(s) Oral Mucosa every 12 hours  chlorhexidine 2% Cloths 1 Application(s) Topical daily  diphtheria/tetanus/pertussis (acellular) Vaccine (Adacel) 0.5 milliLiter(s) IntraMuscular once  heparin  Infusion. 1300 Unit(s)/Hr (13 mL/Hr) IV Continuous <Continuous>  lactated ringers. 1000 milliLiter(s) (75 mL/Hr) IV Continuous <Continuous>  levETIRAcetam  IVPB 1500 milliGRAM(s) IV Intermittent every 12 hours  petrolatum Ophthalmic Ointment 1 Application(s) Both EYES two times a day  piperacillin/tazobactam IVPB.. 3.375 Gram(s) IV Intermittent every 8 hours  vancomycin  IVPB 1250 milliGRAM(s) IV Intermittent every 8 hours    --------------------------------------------------------------------------------------------------  Study Interpretation:    [[[Abbreviation Key:  PDR=alpha rhythm/posterior dominant rhythm. A-P=anterior posterior.  Amplitude: ‘very low’:<20; ‘low’:20-49; ‘medium’:; ‘high’:>150uV.  Persistence for periodic/rhythmic patterns (% of epoch) ‘rare’:<1%; ‘occasional’:1-10%; ‘frequent’:10-50%; ‘abundant’:50-90%; ‘continuous’:>90%.  Persistence for sporadic discharges: ‘rare’:<1/hr; ‘occasional’:1/min-1/hr; ‘frequent’:>1/min; ‘abundant’:>1/10 sec.  RPP=rhythmic and periodic patterns; GRDA=generalized rhythmic delta activity; FIRDA=frontal intermittent GRDA; LRDA=lateralized rhythmic delta activity; TIRDA=temporal intermittent rhythmic delta activity;  LPD=PLED=lateralized periodic discharges; GPD=generalized periodic discharges; BIPDs =bilateral independent periodic discharges; Mf=multifocal; SIRPDs=stimulus induced rhythmic, periodic, or ictal appearing discharges; BIRDs=brief potentially ictal rhythmic discharges >4 Hz, lasting .5-10s; PFA (paroxysmal bursts >13 Hz or =8 Hz <10s).  Modifiers: +F=with fast component; +S=with spike component; +R=with rhythmic component.  S-B=burst suppression pattern.  Max=maximal. N1-drowsy; N2-stage II sleep; N3-slow wave sleep. SSS/BETS=small sharp spikes/benign epileptiform transients of sleep. HV=hyperventilation; PS=photic stimulation]]]    Daily EEG Visual Analysis    FINDINGS:      Background:  Continuity: discontinuous (approximately 30% suppressed <10 uV or attenuated < 20 uV)  Symmetry: asymmetric  Posterior dominant rhythm (PDR): none  State change: absent  Voltage: variable, often suppressed or attenuated  Anterior Posterior Gradient: absent  Other background findings: The predominant background consists of diffuse polymorphic delta frequencies with rare intermittent theta >> beta frequencies, occurring in bursts/runs lasting 1-20 seconds between periods of suppression or attenuation lasting 1-5 seconds. There are occasional bursts of frontally predominant generalized rhythmic delta activity (GRDA), more prominent on the left.  Breach: absent    Background Slowing:  Generalized slowing:   Focal slowing: Occasional right hemispheric relative attenuation    State Changes:   No normal sleep architecture is captured.    Sporadic Epileptiform Discharges and Rhythmic and Periodic Patterns (RPPs):    -In the afternoon of 12/06/22, there appear occasional right frontal (Fp2) bluntly contoured sharp waves, often periodic at 0.3 Hz. Overnight, these appear as occasional runs of right frontal (Fp2/F4) lateralized periodic discharges at 1.3 Hz without clear evolution and without clinical change on video.  -Rare left frontal (F3) very low- to low-amplitude bluntly contoured sharp waves    Electrographic and Electroclinical seizures:  None    Other Clinical Events:  None    Activation Procedures:   -Hyperventilation was not performed.    -Photic stimulation was not performed.    Artifacts:  Intermittent myogenic and movement artifacts were noted.    EKG:  Single-lead EKG shows regular rhythm at 70-90 bpm.    EEG Classification / Summary:  Abnormal EEG in a comatose patient due to:  -Occasional right frontal sharp waves, at times appearing as occasional runs of lateralized periodic discharges (LPDs) at up to 1.3 Hz  -Occasional right hemispheric relative attenuation  -Rare left frontal very low- to low-amplitude bluntly contoured sharp waves  -Severe diffuse slowing and occasional bursts of frontally predominant generalized rhythmic delta activity (GRDA)    Clinical Impression:  -Occasional runs of right frontal lateralized periodic discharges (LPDs) at up to 1.3 Hz indicate a potentially epileptogenic focus in the right frontal region and are on the ictal-interictal continuum.  -A pattern on the ictal-interictal continuum is a pattern that does not qualify as an electrographic seizure or electrographic status epilepticus, but there is a reasonable chance that it may be contributing to impaired alertness, causing other clinical symptoms, and/or contributing to neuronal injury.  -Right hemispheric relative attenuation indicates right hemispheric focal cerebral dysfunction, which can be structural or functional in etiology.  -Rare left frontal epileptiform discharges indicate a potentially epileptogenic focus in this reigon  -Severe diffuse slowing and GRDA indicate severe diffuse cerebral dysfunction of nonspecific etiology.  -No electrographic seizures are captured.        -------------------------------------------------------------------------------------------------------  [facility] EEG Reading Room Ph#: [phone]  Epilepsy Answering Service after 5PM and before 8:30AM: Ph#: [phone]    Kylah Powell MD  Attending Physician, [facility] Epilepsy [place]

## 2022-12-07 NOTE — PROGRESS NOTE ADULT - ASSESSMENT
ASSESSMENT & PLAN:	  52-year-old male with a PMHx significant for TIAs (2011, 2013), CVAs x3 (02/2022 s/p tPA, 8/3/2022 s/p tPA, 11/5/2022 with residual expressive aphasia) on Eliquis, and HLD BIBEMS after being found unresponsive at home on the floor, last known well 12/1 at around noon. C-collar was placed. Patient was intubated in the ED for airway protection. CTH with no acute findings, vEEG with no identified seizures. Patient was weaned off pressors, extubated, and transitioned to floors 12/4/22. 12/5 am RRT called for seizure like activity with urinary incontinence, tongue biting, and R>L posturing, patient s/p ativan 2mg x3. Anesthesia called to RRT for intubation. MICU accepting patient for seizure like activity requiring intubation.     NEURO  #AMS, Seizure like activity  - Patient found down and unresponsive at home on 12/2, last known well 12/1 at around noon. Intubated in ED 12/2, extubated in MICU 12/4  - RRT 12/5: seizure like acting with urinary incontinence, convulsions, and tongue biting; s/p ativan 2mg x3 with pauses in seizure activity following each; intubated for airway protection with rocuronium. Prolactin ~75   - CTH and CT cervical spine 12/2 negative for any acute pathologies. Tox screen on admission negative. 12/5: Stable exam from prior CThead, chronic microvascular ischemic changes, parenchymal loss, dystrophic calcification of central portion of alexis unchanged.  - Video EEG 12/3-12/4 without any seizure like activity, moderate to severe nonspecific diffuse or multifocal cerebral dysfunction  - propofol off since 3am 12/6  - c/w keppra 1500mg q12 maintenance, Keppra loaded 4.5g  - Pending MR brain for stroke evaluation, patient has loop recorder & MRI compatible per Radiology   - 24hr Video EEG placed on 12/5 - EEG read without any seizure activity, some slowing likely due to propofol   - f/u neuro recs     #CVAs/TIAs  - Hx of TIAs in 2011 and 2013, CVAs x3 in 02/22, 8/22, and 11/22 with residual expressive aphasia   - on Eliquis and Aspirin at home for hx of stroke  - PFO work up in past negative, no evidence of PFO on MIMI X2  - c/w ASA   - c/w heparin gtt  - Restart Statin when LFTs and CPK improve per neurology  - Being worked up for Mixed Connective Tissue Disease OP- f/u p-ANCA, c-ANCA/ [id number] negative  - Neuro recs appreciated    #Thecal sac flattening   - CT thoracic and lumbar spine showing degenerative disc disease T6-T7 through L4-L5 and mild disc bulges at L2-L3 through L4-L5 that flatten the ventral thecal sac and narrowing of the bilateral neural foramina  - Will Monitor for now, will consider neuro/neurosurg evaluation in the future    PULM  #Intubated  - Intubated 12/2, extubated 12/4 in MICU  - Re-intubated 12/5 am for airway protection iso seizure on floors during RRT  - Trend ABGs for vent adjustments   - 12/5 CXR: ETT in place, no acute lung pathology  - CPAP starting 12/6    CARDIAC  #HLD  - atorvastatin on hold due to elevated CK and LFTs  - restart when possible for secondary stroke prevention    #?ASD/PFO  - Patient reportedly found to have a PFO in February 2022 during a stroke workup at [place]. Patient presented for a PFO closure in November 2022. Notably, no PFO was found at the time and no intervention was performed by Dr. Lynn.    - TTE 11/5/22 EF 57% and grossly normal LV function  - MIMI performed bedside 12/5 1 of 2 studies (+) on bubble study (uploaded to [employer])    RENAL  #Rhabdomyolysis  - CK 8720 on admission, peaked at 15k, now downtrending  - DDx: prolonged downtime myositis v hyperthermia? (T 105.4 F) v sepsis v seizure induced  - c/w aggressive IVFs  - Continue checking CK daily  - BMP Q12H with Mg, Phos    GI   #Partial SBO - resolved  - CT chest, abdomen, and pelvis w/ contrast concerning for possible partial SBO without transition point.  - Surgery consulted, no acute intervention at this time  - 12/5 KUB - negative for ileus or SBO  - 12/5 started on TF Jevity 1.5 to goal 65cc/hr    HEME/ONC  #Leukocytosis  - Concern for septic process  - Trend CBCs    ID  #Concern for sepsis  Unclear source, aspiration v less likely meningitis   BCx (12/2 NGTD repeat 12/5 NGTD) and UC 12/2 NGTD  - Patient initially presented with AMS, T 105.4, tachycardic, and tachypneic   - UA negative  - s/p vanco and zosyn x1 in ED 12/2, ceftriaxone 2g BID 12/3-12/4, vanco 12/3-12/4  - vanc 1G TID and ceftriaxone 2G BID for meningitis PPX 12/5   - 12/6 vanc 1G TID pending MRSA swab check and Zosyn 12/6 for ASP PNA coverage for persistent fevers    ENDO  - Concern of continued exogenous testosterone use contributing to hypercoagulation?  - Hold off on endo consult until better history is obtained when patient awakes  - No acute issues    PPx/Ethics  Diet: Tube feeds via OG tube  DVT PPx: Hep gtt full AC  GOC: ongoing   ASSESSMENT & PLAN:	  52-year-old male with a PMHx significant for TIAs (2011, 2013), CVAs x3 (02/2022 s/p tPA, 8/3/2022 s/p tPA, 11/5/2022 with residual expressive aphasia) on Eliquis, and HLD BIBEMS after being found unresponsive at home on the floor, last known well 12/1 at around noon. C-collar was placed. Patient was intubated in the ED for airway protection. CTH with no acute findings, vEEG with no identified seizures. Patient was weaned off pressors, extubated, and transitioned to floors 12/4/22. 12/5 am RRT called for seizure like activity with urinary incontinence, tongue biting, and R>L posturing, patient s/p ativan 2mg x3. Anesthesia called to RRT for intubation. MICU accepting patient for seizure like activity requiring intubation.     NEURO  #AMS, Seizure like activity  EEG 12/7: concern for epileptiform activity; though EEG from 12/5 without such abnormalities.   - c/w EEG  - f/u neuro recs  - Patient found down and unresponsive at home on 12/2, last known well 12/1 at around noon. Intubated in ED 12/2, extubated in MICU 12/4  - RRT 12/5: seizure like acting with urinary incontinence, convulsions, and tongue biting; s/p ativan 2mg x3 with pauses in seizure activity following each; intubated for airway protection with rocuronium. Prolactin ~75   - CTH and CT cervical spine 12/2 negative for any acute pathologies. Tox screen on admission negative. 12/5: Stable exam from prior CThead, chronic microvascular ischemic changes, parenchymal loss, dystrophic calcification of central portion of alexis unchanged.  - Video EEG 12/3-12/4 without any seizure like activity, moderate to severe nonspecific diffuse or multifocal cerebral dysfunction  - propofol off since 3am 12/6  - c/w keppra 1500mg q12 maintenance, Keppra loaded 4.5g  - Pending MR brain for stroke evaluation, patient has loop recorder & MRI compatible per Radiology     #CVAs/TIAs  - Hx of TIAs in 2011 and 2013, CVAs x3 in 02/22, 8/22, and 11/22 with residual expressive aphasia   - on Eliquis and Aspirin at home for hx of stroke  - PFO work up in past negative, no evidence of PFO on MIMI X2  - c/w ASA   - c/w heparin gtt  - Restart Statin when LFTs and CPK improve per neurology  - Being worked up for Mixed Connective Tissue Disease OP- f/u p-ANCA, c-ANCA/ [id number] negative  - Neuro recs appreciated    #Thecal sac flattening   - CT thoracic and lumbar spine showing degenerative disc disease T6-T7 through L4-L5 and mild disc bulges at L2-L3 through L4-L5 that flatten the ventral thecal sac and narrowing of the bilateral neural foramina  - Will Monitor for now, will consider neuro/neurosurg evaluation in the future    PULM  #Intubated  - Intubated 12/2, extubated 12/4 in MICU  - Re-intubated 12/5 am for airway protection iso seizure on floors during RRT  - Trend ABGs for vent adjustments   - 12/5 CXR: ETT in place, no acute lung pathology  - CPAP starting 12/6    CARDIAC  #HLD  - atorvastatin on hold due to elevated CK and LFTs  - restart when possible for secondary stroke prevention    #?ASD/PFO  - Patient reportedly found to have a PFO in February 2022 during a stroke workup at [place]. Patient presented for a PFO closure in November 2022. Notably, no PFO was found at the time and no intervention was performed by Dr. Lynn.    - TTE 11/5/22 EF 57% and grossly normal LV function  - MIMI performed bedside 12/5 1 of 2 studies (+) on bubble study (uploaded to [facility])    RENAL  #Rhabdomyolysis  - CK 8720 on admission, peaked at 15k, now downtrending  - DDx: prolonged downtime myositis v hyperthermia? (T 105.4 F) v sepsis v seizure induced  - stopped IVF 12/7 since CK ~2k  - Continue checking CK daily  - BMP Q12H with Mg, Phos    GI   #Partial SBO - resolved  - CT chest, abdomen, and pelvis w/ contrast concerning for possible partial SBO without transition point.  - Surgery consulted, no acute intervention at this time  - 12/5 KUB - negative for ileus or SBO  - Hold TF for 12/7 for possible trial of intubation  - otherwise diet per nutrition    HEME/ONC  #Leukocytosis  - Concern for septic process  - Trend CBCs    ID  #Concern for sepsis  Unclear source, aspiration v less likely meningitis   BCx (12/2 NGTD repeat 12/5 NGTD) and UC 12/2 NGTD  - Patient initially presented with AMS, T 105.4, tachycardic, and tachypneic   - UA negative  - s/p vanco and zosyn x1 in ED 12/2, ceftriaxone 2g BID 12/3-12/4, vanco 12/3-12/4  - 12/7 DCed Vanc since BCx from 12/5 NGTD  - c/w zosyn for aspiration pna presumed 12/6-     ENDO  - Concern of continued exogenous testosterone use contributing to hypercoagulation?  - Hold off on endo consult until better history is obtained when patient awakes  - No acute issues    PPx/Ethics  Diet: Tube feeds via OG tube, hold prior to trial of extuation  DVT PPx: Hep gtt full AC  GOC: ongoing

## 2022-12-07 NOTE — PROGRESS NOTE ADULT - SUBJECTIVE AND OBJECTIVE BOX
Name of Patient : TAMI DUNHAM  MRN: [id number]  Date of visit: 12-07-22       Subjective: Patient seen and examined. No new events except as noted.   off sedation   drowsy      MEDICATIONS:  MEDICATIONS  (STANDING):  aspirin  chewable 81 milliGRAM(s) Oral daily  chlorhexidine 0.12% Liquid 15 milliLiter(s) Oral Mucosa every 12 hours  chlorhexidine 2% Cloths 1 Application(s) Topical daily  diphtheria/tetanus/pertussis (acellular) Vaccine (Adacel) 0.5 milliLiter(s) IntraMuscular once  heparin  Infusion. 1300 Unit(s)/Hr (13 mL/Hr) IV Continuous <Continuous>  lacosamide Solution 100 milliGRAM(s) Oral two times a day  levETIRAcetam  IVPB 1500 milliGRAM(s) IV Intermittent every 12 hours  petrolatum Ophthalmic Ointment 1 Application(s) Both EYES two times a day  piperacillin/tazobactam IVPB.. 3.375 Gram(s) IV Intermittent every 8 hours      PHYSICAL EXAM:  T(C): 37.4 (12-07-22 @ 20:00), Max: 38 (12-07-22 @ 00:00)  HR: 75 (12-07-22 @ 22:00) (67 - 84)  BP: 136/81 (12-07-22 @ 22:00) (123/80 - 146/88)  RR: 19 (12-07-22 @ 22:00) (14 - 19)  SpO2: 98% (12-07-22 @ 22:00) (95% - 100%)  Wt(kg): --  I&O's Summary    06 Dec 2022 07:01  -  07 Dec 2022 07:00  --------------------------------------------------------  IN: 4227 mL / OUT: 3095 mL / NET: 1132 mL    07 Dec 2022 07:01  -  07 Dec 2022 22:51  --------------------------------------------------------  IN: 1060 mL / OUT: 2120 mL / NET: -1060 mL          Appearance: intubated   HEENT:  PERRLA   Lymphatic: No lymphadenopathy   Cardiovascular: Normal S1 S2, no JVD  Respiratory: normal effort , clear  Gastrointestinal:  Soft, Non-tender  Skin: No rashes,  warm to touch  Musculuskeletal: No edema      All labs, Imaging and EKGs personally reviewed       12-06-22 @ 07:01  -  12-07-22 @ 07:00  --------------------------------------------------------  IN: 4227 mL / OUT: 3095 mL / NET: 1132 mL    12-07-22 @ 07:01  -  12-07-22 @ 22:51  --------------------------------------------------------  IN: 1060 mL / OUT: 2120 mL / NET: -1060 mL                            9.7    9.43  )-----------( 148      ( 07 Dec 2022 02:55 )             28.7               12-07    137  |  105  |  10  ----------------------------<  131<H>  3.6   |  28  |  0.81    Ca    8.1<L>      07 Dec 2022 02:55  Phos  3.9     12-06  Mg     2.40     12-06    TPro  5.8<L>  /  Alb  2.9<L>  /  TBili  0.6  /  DBili  x   /  AST  101<H>  /  ALT  68<H>  /  AlkPhos  61  12-07    PTT - ( 07 Dec 2022 02:55 )  PTT:60.3 sec       CARDIAC MARKERS ( 07 Dec 2022 02:55 )  x     / x     / 2704 U/L / x     / x      CARDIAC MARKERS ( 06 Dec 2022 13:30 )  x     / x     / 3693 U/L / x     / x      CARDIAC MARKERS ( 06 Dec 2022 00:45 )  x     / x     / 4686 U/L / x     / x

## 2022-12-07 NOTE — PROGRESS NOTE ADULT - ASSESSMENT
ASSESSMENT & PLAN:	  52-year-old male with a PMHx significant for TIAs (2011, 2013), CVAs x3 (02/2022 s/p tPA, 8/3/2022 s/p tPA, 11/5/2022 with residual expressive aphasia) on Eliquis, and HLD BIBEMS after being found unresponsive at home on the floor, last known well 12/1 at around noon. C-collar was placed. Patient was intubated in the ED for airway protection. CTH with no acute findings, vEEG with no identified seizures. Patient was weaned off pressors, extubated, and transitioned to floors 12/4/22. 12/5 am RRT called for seizure like activity with urinary incontinence, tongue biting, and R>L posturing, patient s/p ativan 2mg x3. Anesthesia called to RRT for intubation. MICU accepting patient for seizure like activity requiring intubation.     #AMS, Seizure like activity  EEG 12/7: concern for epileptiform activity; though EEG from 12/5 without such abnormalities.   - f/u neuro recs  - Patient found down and unresponsive at home on 12/2, last known well 12/1 at around noon. Intubated in ED 12/2, extubated in MICU 12/4  - RRT 12/5: seizure like acting with urinary incontinence, convulsions, and tongue biting; s/p ativan 2mg x3 with pauses in seizure activity following each; intubated for airway protection with rocuronium. Prolactin ~75   - CTH and CT cervical spine 12/2 negative for any acute pathologies. Tox screen on admission negative. 12/5: Stable exam from prior CThead, chronic microvascular ischemic changes, parenchymal loss, dystrophic calcification of central portion of alexis unchanged.  - Video EEG 12/3-12/4 without any seizure like activity, moderate to severe nonspecific diffuse or multifocal cerebral dysfunction  - propofol off since 3am 12/6    #CVAs/TIAs  - Hx of TIAs in 2011 and 2013, CVAs x3 in 02/22, 8/22, and 11/22 with residual expressive aphasia   - on Eliquis and Aspirin at home for hx of stroke  - PFO work up in past negative, no evidence of PFO on MIMI X2  - c/w ASA   - c/w heparin gtt  - Restart Statin when LFTs and CPK improve per neurology  - Being worked up for Mixed Connective Tissue Disease OP- f/u p-ANCA, c-ANCA/ [id number] negative  - Neuro recs appreciated    #Thecal sac flattening   - CT thoracic and lumbar spine showing degenerative disc disease T6-T7 through L4-L5 and mild disc bulges at L2-L3 through L4-L5 that flatten the ventral thecal sac and narrowing of the bilateral neural foramina  - Will Monitor for now, will consider neuro/neurosurg evaluation in the future    #HLD  - atorvastatin on hold due to elevated CK and LFTs  - restart when possible for secondary stroke prevention    #?ASD/PFO  - Patient reportedly found to have a PFO in February 2022 during a stroke workup at [place]. Patient presented for a PFO closure in November 2022. Notably, no PFO was found at the time and no intervention was performed by Dr. Lynn.    - TTE 11/5/22 EF 57% and grossly normal LV function  - MIMI performed bedside 12/5 1 of 2 studies (+) on bubble study (uploaded to [facility])      #Rhabdomyolysis  - CK 8720 on admission, peaked at 15k, now downtrending  - DDx: prolonged downtime myositis v hyperthermia? (T 105.4 F) v sepsis v seizure induced  - stopped IVF 12/7 since CK ~2k  - Continue checking CK daily  - BMP Q12H with Mg, Phos      #Partial SBO - resolved  - CT chest, abdomen, and pelvis w/ contrast concerning for possible partial SBO without transition point.  - Surgery consulted, no acute intervention at this time  - 12/5 KUB - negative for ileus or SBO  - Hold TF for 12/7 for possible trial of intubation  - otherwise diet per nutrition    #Concern for sepsis  Unclear source, aspiration v less likely meningitis   BCx (12/2 NGTD repeat 12/5 NGTD) and UC 12/2 NGTD  - Patient initially presented with AMS, T 105.4, tachycardic, and tachypneic   - UA negative  - s/p vanco and zosyn x1 in ED 12/2, ceftriaxone 2g BID 12/3-12/4, vanco 12/3-12/4  - 12/7 DCed Vanc since BCx from 12/5 NGTD  - c/w zosyn for aspiration pna presumed 12/6-

## 2022-12-07 NOTE — PROGRESS NOTE ADULT - SUBJECTIVE AND OBJECTIVE BOX
MICU PROGRESS NOTE    INTERVAL HPI/OVERNIGHT EVENTS:  - vanc increased to 1250 q8    SUBJECTIVE:     OBJECTIVE:    VITAL SIGNS:  ICU Vital Signs Last 24 Hrs  T(C): 37.6 (07 Dec 2022 04:00), Max: 38.2 (06 Dec 2022 08:00)  T(F): 99.7 (07 Dec 2022 04:00), Max: 100.8 (06 Dec 2022 08:00)  HR: 72 (07 Dec 2022 06:00) (72 - 91)  BP: 123/80 (07 Dec 2022 06:00) (123/80 - 148/100)  BP(mean): 92 (07 Dec 2022 06:00) (91 - 116)  ABP: --  ABP(mean): --  RR: 17 (07 Dec 2022 06:00) (16 - 19)  SpO2: 100% (07 Dec 2022 06:00) (87% - 100%)    O2 Parameters below as of 07 Dec 2022 06:00  Patient On (Oxygen Delivery Method): ventilator,CPAP    O2 Concentration (%): 40    VENTS:  Mode: CPAP with PS, FiO2: 40, PEEP: 5, PS: 5, MAP: 8, PIP: 12    I&O:     @ 07:01  -   @ 07:00  --------------------------------------------------------  IN: 4083 mL / OUT: 3095 mL / NET: 988 mL    PHYSICAL EXAM:  GENERAL: NAD, well-groomed, well-developed  HEAD:  Atraumatic, Normocephalic  EYES: EOMI, PERRLA, conjunctiva and sclera clear  ENMT:  Moist mucous membranes  NECK: Supple, No JVD  CHEST/LUNG: Clear to auscultation bilaterally; No rales, rhonchi, wheezing, or rubs on vent for airway protection  HEART: Regular rate and rhythm; No mrg  ABDOMEN: Soft, Nontender, Nondistended; Bowel sounds present, NGT 14 Fr [place] sump in place for decompression for possible SBO versus ileus  NEURO: R arm posturing, R leg kicking, and intermittent left sided posturing   EXTREMITIES: No LE edema, no calf tenderness  SKIN: Multiple bruising and ecchymosis present bilaterally on chest, UE, LE, thighs, etc as noted on admission                              MEDICATIONS:  MEDICATIONS  (STANDING):  aspirin enteric coated 81 milliGRAM(s) Oral daily  chlorhexidine 0.12% Liquid 15 milliLiter(s) Oral Mucosa every 12 hours  chlorhexidine 2% Cloths 1 Application(s) Topical daily  diphtheria/tetanus/pertussis (acellular) Vaccine (Adacel) 0.5 milliLiter(s) IntraMuscular once  heparin  Infusion. 1300 Unit(s)/Hr (13 mL/Hr) IV Continuous <Continuous>  lactated ringers. 1000 milliLiter(s) (75 mL/Hr) IV Continuous <Continuous>  levETIRAcetam  IVPB 1500 milliGRAM(s) IV Intermittent every 12 hours  petrolatum Ophthalmic Ointment 1 Application(s) Both EYES two times a day  piperacillin/tazobactam IVPB.. 3.375 Gram(s) IV Intermittent every 8 hours  vancomycin  IVPB 1250 milliGRAM(s) IV Intermittent every 8 hours    MEDICATIONS  (PRN):  aluminum hydroxide/magnesium hydroxide/simethicone Suspension 30 milliLiter(s) Oral every 4 hours PRN Dyspepsia  heparin   Injectable 8000 Unit(s) IV Push every 6 hours PRN For aPTT less than 40  heparin   Injectable 4000 Unit(s) IV Push every 6 hours PRN For aPTT between 40 - 57    ALLERGIES:  Allergies    No Known Allergies    Intolerances    LABS:                        9.7    9.43  )-----------( 148      ( 07 Dec 2022 02:55 )             28.7     12-    137  |  105  |  10  ----------------------------<  131<H>  3.6   |  28  |  0.81    Ca    8.1<L>      07 Dec 2022 02:55  Phos  3.9     12-  Mg     2.40     12-    TPro  5.8<L>  /  Alb  2.9<L>  /  TBili  0.6  /  DBili  x   /  AST  101<H>  /  ALT  68<H>  /  AlkPhos  61  12-    PTT - ( 07 Dec 2022 02:55 )  PTT:60.3 sec  Urinalysis Basic - ( 05 Dec 2022 21:00 )    Color: Yellow / Appearance: Clear / S.048 / pH: x  Gluc: x / Ketone: Moderate  / Bili: Negative / Urobili: <2 mg/dL   Blood: x / Protein: 100 mg/dL / Nitrite: Negative   Leuk Esterase: Negative / RBC: 0 /HPF / WBC 1 /HPF   Sq Epi: x / Non Sq Epi: x / Bacteria: x    CPK 2704    BCx 12/5 NGTD    CAPILLARY BLOOD GLUCOSE    RADIOLOGY & ADDITIONAL TESTS: Reviewed. MICU PROGRESS NOTE    INTERVAL HPI/OVERNIGHT EVENTS:  - off sedation since yesterday AM  - vanc increased to 1250 q8 yesterday for low serum level    SUBJECTIVE: Opens eyes, but unable to follow commands. Moving hands. Unable to obtain ROS 2/2 MS.     OBJECTIVE:    VITAL SIGNS:  ICU Vital Signs Last 24 Hrs  T(C): 37.6 (07 Dec 2022 04:00), Max: 38.2 (06 Dec 2022 08:00)  T(F): 99.7 (07 Dec 2022 04:00), Max: 100.8 (06 Dec 2022 08:00)  HR: 72 (07 Dec 2022 06:00) (72 - 91)  BP: 123/80 (07 Dec 2022 06:00) (123/80 - 148/100)  BP(mean): 92 (07 Dec 2022 06:00) (91 - 116)  ABP: --  ABP(mean): --  RR: 17 (07 Dec 2022 06:00) (16 - 19)  SpO2: 100% (07 Dec 2022 06:00) (87% - 100%)    O2 Parameters below as of 07 Dec 2022 06:00  Patient On (Oxygen Delivery Method): ventilator,CPAP    O2 Concentration (%): 40    VENTS:  Mode: CPAP with PS, FiO2: 40, PEEP: 5, PS: 5, MAP: 8, PIP: 12    I&O:     @ 07:01  -   @ 07:00  --------------------------------------------------------  IN: 4083 mL / OUT: 3095 mL / NET: 988 mL    PHYSICAL EXAM:  GENERAL: NAD, well-groomed, well-developed  HEAD:  Atraumatic, Normocephalic  EYES: EOMI, PERRLA, conjunctiva and sclera clear  ENMT:  Moist mucous membranes  NECK: Supple, No JVD  CHEST/LUNG: Clear to auscultation bilaterally; No rales, rhonchi, wheezing, or rubs on vent for airway protection  HEART: Regular rate and rhythm; No mrg  ABDOMEN: Soft, Nontender, Nondistended; Bowel sounds present, NGT 14 Fr [place] sump in place for decompression for possible SBO versus ileus  NEURO: R arm moving non-purposefully, R leg kicking, and intermittent left sided posturing   EXTREMITIES: No LE edema, no calf tenderness  SKIN: Multiple bruising and ecchymosis present bilaterally on chest, UE, LE, thighs, etc as noted on admission                              MEDICATIONS:  MEDICATIONS  (STANDING):  aspirin enteric coated 81 milliGRAM(s) Oral daily  chlorhexidine 0.12% Liquid 15 milliLiter(s) Oral Mucosa every 12 hours  chlorhexidine 2% Cloths 1 Application(s) Topical daily  diphtheria/tetanus/pertussis (acellular) Vaccine (Adacel) 0.5 milliLiter(s) IntraMuscular once  heparin  Infusion. 1300 Unit(s)/Hr (13 mL/Hr) IV Continuous <Continuous>  lactated ringers. 1000 milliLiter(s) (75 mL/Hr) IV Continuous <Continuous>  levETIRAcetam  IVPB 1500 milliGRAM(s) IV Intermittent every 12 hours  petrolatum Ophthalmic Ointment 1 Application(s) Both EYES two times a day  piperacillin/tazobactam IVPB.. 3.375 Gram(s) IV Intermittent every 8 hours  vancomycin  IVPB 1250 milliGRAM(s) IV Intermittent every 8 hours    MEDICATIONS  (PRN):  aluminum hydroxide/magnesium hydroxide/simethicone Suspension 30 milliLiter(s) Oral every 4 hours PRN Dyspepsia  heparin   Injectable 8000 Unit(s) IV Push every 6 hours PRN For aPTT less than 40  heparin   Injectable 4000 Unit(s) IV Push every 6 hours PRN For aPTT between 40 - 57    ALLERGIES:  Allergies    No Known Allergies    Intolerances    LABS:                        9.7    9.43  )-----------( 148      ( 07 Dec 2022 02:55 )             28.7     12-07    137  |  105  |  10  ----------------------------<  131<H>  3.6   |  28  |  0.81    Ca    8.1<L>      07 Dec 2022 02:55  Phos  3.9     12-06  Mg     2.40     12-06    TPro  5.8<L>  /  Alb  2.9<L>  /  TBili  0.6  /  DBili  x   /  AST  101<H>  /  ALT  68<H>  /  AlkPhos  61  12-07    PTT - ( 07 Dec 2022 02:55 )  PTT:60.3 sec  Urinalysis Basic - ( 05 Dec 2022 21:00 )    Color: Yellow / Appearance: Clear / S.048 / pH: x  Gluc: x / Ketone: Moderate  / Bili: Negative / Urobili: <2 mg/dL   Blood: x / Protein: 100 mg/dL / Nitrite: Negative   Leuk Esterase: Negative / RBC: 0 /HPF / WBC 1 /HPF   Sq Epi: x / Non Sq Epi: x / Bacteria: x    CPK 2704    BCx 12/ NGTD    CAPILLARY BLOOD GLUCOSE    RADIOLOGY & ADDITIONAL TESTS: Reviewed.

## 2022-12-07 NOTE — PROGRESS NOTE ADULT - ASSESSMENT
52-year-old  M known to me from outpatient setting with  TIAs (2011, 2013), Strokes x3 (02/2022 s/p tPA, 8/3/2022 s/p tPA, 11/5/2022 with residual expressive aphasia) on Eliquis, was on testosterone outpatient stopped after last stroke, HLD BIBEMS after being found unresponsive  Temp 105.4 on arrival tachy and /110. 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   CT cervical spine and maxillofacial scans negative.  CT chest, abdomen, and pelvis w/ contrast concerning for possible partial SBO without transition point. Surgery consulted in ED, no acute surgical intervention at this time.   CT thoracic and lumbar spine showing degenerative disc disease T6-T7 through L4-L5 and mild disc bulges at L2-L3 through L4-L5 that flatten the ventral thecal sac and narrowing of the bilateral neural foramina.  MRI 11/2022: R centrum semiovale and R posterior frontal infarcts   takes adderall at home   + rhabdo  EEG no seizures   + transaminitis   CTH 12/5 stable   outpatient hypercoag workup neg   12/5 extubated then reintibuated for seizure activity and tx back to ICU   EEG this AM 12/5 with only slowing   EEG 12/6 slowing but no seizures   spoke with Winter Conti (friend) who states after he was taken off the testosterone he ordered a mail order testosterone supplement, unclear if he started it yet, unclear what this was  12/7 EEG no electrogtraphic seizures captured but R LPDs, rare L frontal discharges, severe GRDA.     Impression:   1) AMS of unclear etiology, possible now seizure, related to adderral withdrawal?   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 had MIMI was question of PFO but not found. s/p ILR     - would start Vimpat 100mg BID at this time   - loaded with keppra overnight and started 1500mg TID.  please lower to 1500mg BID.   - MRI brain seizure protocol (would come off eeg for this today if possible)  - fever returned.  would consider LP at this point after MRI if no source of infection found   - monitor LFTs , downtrending   - IVF  - CPK elevated. trend   - would consider endocrine   - there was some concern outpatient he may have a mixed  connective tissue disorder   - CTX and vanco  - statin therapy for secondary stroke prevention when LFTS and CPK improve   - on heparin drip    - telemetry  - PT/OT/SS/SLP, OOBC  - check FS, glucose control <180  - GI/DVT ppx  - Thank you for allowing me to participate in the care of this patient. Call with questions.   - spoke with ICU team  - spoke with Winter Conti at [phone] for more collateral info and to provide update.   - spoke [facility] ICU resident   Braxton Forde MD  Vascular Neurology  Office: [phone]

## 2022-12-07 NOTE — PROGRESS NOTE ADULT - ATTENDING COMMENTS
Agree with above. Seen and examined with team on rounds. Critically ill requiring frequent bedside visits. Intubated for CHF and volume overload and also narrow opening of the vocal cords. Failed extubation trial. For trach and peg today, continue gentle diuresis. Supportive care. Family updated. Agree with above. Seen and examined with team on rounds. Critically ill requiring frequent bedside visits. Intubated for seizures and AMS. History of multiple strokes. Unclear etiology of the strokes. Weaning trials as tolerated. EEG in place. Supportive care. Family updated.

## 2022-12-08 DIAGNOSIS — R56.9 UNSPECIFIED CONVULSIONS: ICD-10-CM

## 2022-12-08 DIAGNOSIS — Z86.73 PERSONAL HISTORY OF TRANSIENT ISCHEMIC ATTACK (TIA), AND CEREBRAL INFARCTION WITHOUT RESIDUAL DEFICITS: ICD-10-CM

## 2022-12-08 DIAGNOSIS — E78.5 HYPERLIPIDEMIA, UNSPECIFIED: ICD-10-CM

## 2022-12-08 DIAGNOSIS — I63.9 CEREBRAL INFARCTION, UNSPECIFIED: ICD-10-CM

## 2022-12-08 DIAGNOSIS — R41.89 OTHER SYMPTOMS AND SIGNS INVOLVING COGNITIVE FUNCTIONS AND AWARENESS: ICD-10-CM

## 2022-12-08 DIAGNOSIS — Q21.12 PATENT FORAMEN OVALE: ICD-10-CM

## 2022-12-08 LAB
ALBUMIN SERPL ELPH-MCNC: 2.8 G/DL — LOW (ref 3.3–5)
ALP SERPL-CCNC: 76 U/L — SIGNIFICANT CHANGE UP (ref 40–120)
ALT FLD-CCNC: 64 U/L — HIGH (ref 4–41)
ANION GAP SERPL CALC-SCNC: 7 MMOL/L — SIGNIFICANT CHANGE UP (ref 7–14)
APTT BLD: 69.2 SEC — HIGH (ref 27–36.3)
AST SERPL-CCNC: 72 U/L — HIGH (ref 4–40)
BASE EXCESS BLDV CALC-SCNC: 6.9 MMOL/L — HIGH (ref -2–3)
BILIRUB SERPL-MCNC: 0.6 MG/DL — SIGNIFICANT CHANGE UP (ref 0.2–1.2)
BLOOD GAS VENOUS COMPREHENSIVE RESULT: SIGNIFICANT CHANGE UP
BUN SERPL-MCNC: 11 MG/DL — SIGNIFICANT CHANGE UP (ref 7–23)
CALCIUM SERPL-MCNC: 8.3 MG/DL — LOW (ref 8.4–10.5)
CHLORIDE BLDV-SCNC: 106 MMOL/L — SIGNIFICANT CHANGE UP (ref 96–108)
CHLORIDE SERPL-SCNC: 103 MMOL/L — SIGNIFICANT CHANGE UP (ref 98–107)
CK SERPL-CCNC: 1461 U/L — HIGH (ref 30–200)
CO2 BLDV-SCNC: 32.6 MMOL/L — HIGH (ref 22–26)
CO2 SERPL-SCNC: 29 MMOL/L — SIGNIFICANT CHANGE UP (ref 22–31)
CREAT SERPL-MCNC: 0.78 MG/DL — SIGNIFICANT CHANGE UP (ref 0.5–1.3)
CULTURE RESULTS: SIGNIFICANT CHANGE UP
CULTURE RESULTS: SIGNIFICANT CHANGE UP
EGFR: 107 ML/MIN/1.73M2 — SIGNIFICANT CHANGE UP
GAS PNL BLDV: 138 MMOL/L — SIGNIFICANT CHANGE UP (ref 136–145)
GAS PNL BLDV: SIGNIFICANT CHANGE UP
GLUCOSE BLDV-MCNC: 120 MG/DL — HIGH (ref 70–99)
GLUCOSE SERPL-MCNC: 124 MG/DL — HIGH (ref 70–99)
HCO3 BLDV-SCNC: 31 MMOL/L — HIGH (ref 22–29)
HCT VFR BLD CALC: 26.6 % — LOW (ref 39–50)
HCT VFR BLDA CALC: 28 % — LOW (ref 39–51)
HGB BLD CALC-MCNC: 9.2 G/DL — LOW (ref 13–17)
HGB BLD-MCNC: 8.9 G/DL — LOW (ref 13–17)
LACTATE BLDV-MCNC: 0.7 MMOL/L — SIGNIFICANT CHANGE UP (ref 0.5–2)
MAGNESIUM SERPL-MCNC: 2 MG/DL — SIGNIFICANT CHANGE UP (ref 1.6–2.6)
MCHC RBC-ENTMCNC: 29.2 PG — SIGNIFICANT CHANGE UP (ref 27–34)
MCHC RBC-ENTMCNC: 33.5 GM/DL — SIGNIFICANT CHANGE UP (ref 32–36)
MCV RBC AUTO: 87.2 FL — SIGNIFICANT CHANGE UP (ref 80–100)
NRBC # BLD: 0 /100 WBCS — SIGNIFICANT CHANGE UP (ref 0–0)
NRBC # FLD: 0 K/UL — SIGNIFICANT CHANGE UP (ref 0–0)
PCO2 BLDV: 43 MMHG — SIGNIFICANT CHANGE UP (ref 42–55)
PH BLDV: 7.47 — HIGH (ref 7.32–7.43)
PHOSPHATE SERPL-MCNC: 4.1 MG/DL — SIGNIFICANT CHANGE UP (ref 2.5–4.5)
PLATELET # BLD AUTO: 158 K/UL — SIGNIFICANT CHANGE UP (ref 150–400)
PO2 BLDV: 141 MMHG — SIGNIFICANT CHANGE UP
POTASSIUM BLDV-SCNC: 3.5 MMOL/L — SIGNIFICANT CHANGE UP (ref 3.5–5.1)
POTASSIUM SERPL-MCNC: 3.5 MMOL/L — SIGNIFICANT CHANGE UP (ref 3.5–5.3)
POTASSIUM SERPL-SCNC: 3.5 MMOL/L — SIGNIFICANT CHANGE UP (ref 3.5–5.3)
PROT SERPL-MCNC: 5.7 G/DL — LOW (ref 6–8.3)
RBC # BLD: 3.05 M/UL — LOW (ref 4.2–5.8)
RBC # FLD: 13.8 % — SIGNIFICANT CHANGE UP (ref 10.3–14.5)
SAO2 % BLDV: 99.8 % — SIGNIFICANT CHANGE UP
SODIUM SERPL-SCNC: 139 MMOL/L — SIGNIFICANT CHANGE UP (ref 135–145)
SPECIMEN SOURCE: SIGNIFICANT CHANGE UP
SPECIMEN SOURCE: SIGNIFICANT CHANGE UP
WBC # BLD: 11.06 K/UL — HIGH (ref 3.8–10.5)
WBC # FLD AUTO: 11.06 K/UL — HIGH (ref 3.8–10.5)

## 2022-12-08 PROCEDURE — 95718 EEG PHYS/QHP 2-12 HR W/VEEG: CPT

## 2022-12-08 PROCEDURE — 93308 TTE F-UP OR LMTD: CPT | Mod: 26,GC

## 2022-12-08 PROCEDURE — 99291 CRITICAL CARE FIRST HOUR: CPT | Mod: GC,25

## 2022-12-08 PROCEDURE — 76604 US EXAM CHEST: CPT | Mod: 26,GC

## 2022-12-08 RX ORDER — POTASSIUM CHLORIDE 20 MEQ
10 PACKET (EA) ORAL
Refills: 0 | Status: COMPLETED | OUTPATIENT
Start: 2022-12-08 | End: 2022-12-08

## 2022-12-08 RX ORDER — POTASSIUM CHLORIDE 20 MEQ
40 PACKET (EA) ORAL ONCE
Refills: 0 | Status: DISCONTINUED | OUTPATIENT
Start: 2022-12-08 | End: 2022-12-08

## 2022-12-08 RX ORDER — LACOSAMIDE 50 MG/1
100 TABLET ORAL EVERY 12 HOURS
Refills: 0 | Status: DISCONTINUED | OUTPATIENT
Start: 2022-12-09 | End: 2023-01-05

## 2022-12-08 RX ORDER — POLYETHYLENE GLYCOL 3350 17 G/17G
17 POWDER, FOR SOLUTION ORAL DAILY
Refills: 0 | Status: DISCONTINUED | OUTPATIENT
Start: 2022-12-08 | End: 2022-12-10

## 2022-12-08 RX ORDER — ACETAMINOPHEN 500 MG
1000 TABLET ORAL ONCE
Refills: 0 | Status: COMPLETED | OUTPATIENT
Start: 2022-12-08 | End: 2022-12-08

## 2022-12-08 RX ORDER — LEVETIRACETAM 250 MG/1
1500 TABLET, FILM COATED ORAL EVERY 12 HOURS
Refills: 0 | Status: DISCONTINUED | OUTPATIENT
Start: 2022-12-08 | End: 2023-01-11

## 2022-12-08 RX ORDER — LACOSAMIDE 50 MG/1
100 TABLET ORAL EVERY 12 HOURS
Refills: 0 | Status: DISCONTINUED | OUTPATIENT
Start: 2022-12-08 | End: 2022-12-08

## 2022-12-08 RX ADMIN — LACOSAMIDE 120 MILLIGRAM(S): 50 TABLET ORAL at 18:35

## 2022-12-08 RX ADMIN — Medication 100 MILLIEQUIVALENT(S): at 04:05

## 2022-12-08 RX ADMIN — Medication 100 MILLIEQUIVALENT(S): at 05:07

## 2022-12-08 RX ADMIN — PIPERACILLIN AND TAZOBACTAM 25 GRAM(S): 4; .5 INJECTION, POWDER, LYOPHILIZED, FOR SOLUTION INTRAVENOUS at 22:32

## 2022-12-08 RX ADMIN — Medication 100 MILLIEQUIVALENT(S): at 13:16

## 2022-12-08 RX ADMIN — CHLORHEXIDINE GLUCONATE 1 APPLICATION(S): 213 SOLUTION TOPICAL at 12:05

## 2022-12-08 RX ADMIN — HEPARIN SODIUM 1900 UNIT(S)/HR: 5000 INJECTION INTRAVENOUS; SUBCUTANEOUS at 04:05

## 2022-12-08 RX ADMIN — Medication 1 APPLICATION(S): at 05:07

## 2022-12-08 RX ADMIN — LACOSAMIDE 100 MILLIGRAM(S): 50 TABLET ORAL at 05:06

## 2022-12-08 RX ADMIN — LEVETIRACETAM 400 MILLIGRAM(S): 250 TABLET, FILM COATED ORAL at 12:05

## 2022-12-08 RX ADMIN — Medication 100 MILLIEQUIVALENT(S): at 06:12

## 2022-12-08 RX ADMIN — LACOSAMIDE 100 MILLIGRAM(S): 50 TABLET ORAL at 00:08

## 2022-12-08 RX ADMIN — PIPERACILLIN AND TAZOBACTAM 25 GRAM(S): 4; .5 INJECTION, POWDER, LYOPHILIZED, FOR SOLUTION INTRAVENOUS at 13:16

## 2022-12-08 RX ADMIN — Medication 100 MILLIEQUIVALENT(S): at 12:04

## 2022-12-08 RX ADMIN — Medication 400 MILLIGRAM(S): at 22:47

## 2022-12-08 RX ADMIN — PIPERACILLIN AND TAZOBACTAM 25 GRAM(S): 4; .5 INJECTION, POWDER, LYOPHILIZED, FOR SOLUTION INTRAVENOUS at 05:06

## 2022-12-08 RX ADMIN — CHLORHEXIDINE GLUCONATE 15 MILLILITER(S): 213 SOLUTION TOPICAL at 05:06

## 2022-12-08 NOTE — PROGRESS NOTE ADULT - ASSESSMENT
ASSESSMENT & PLAN:	  52-year-old male with a PMHx significant for TIAs (2011, 2013), CVAs x3 (02/2022 s/p tPA, 8/3/2022 s/p tPA, 11/5/2022 with residual expressive aphasia) on Eliquis, and HLD BIBEMS after being found unresponsive at home on the floor, last known well 12/1 at around noon. C-collar was placed. Patient was intubated in the ED for airway protection. CTH with no acute findings, vEEG with no identified seizures. Patient was weaned off pressors, extubated, and transitioned to floors 12/4/22. 12/5 am RRT called for seizure like activity with urinary incontinence, tongue biting, and R>L posturing, patient s/p ativan 2mg x3. Anesthesia called to RRT for intubation. MICU accepting patient for seizure like activity requiring intubation.     NEURO  #AMS, Seizure like activity  EEG 12/7: concern for epileptiform activity; though EEG from 12/5 without such abnormalities.   - c/w EEG  - f/u neuro recs  - Patient found down and unresponsive at home on 12/2, last known well 12/1 at around noon. Intubated in ED 12/2, extubated in MICU 12/4  - RRT 12/5: seizure like acting with urinary incontinence, convulsions, and tongue biting; s/p ativan 2mg x3 with pauses in seizure activity following each; intubated for airway protection with rocuronium. Prolactin ~75   - CTH and CT cervical spine 12/2 negative for any acute pathologies. Tox screen on admission negative. 12/5: Stable exam from prior CThead, chronic microvascular ischemic changes, parenchymal loss, dystrophic calcification of central portion of alexis unchanged.  - Video EEG 12/3-12/4 without any seizure like activity, moderate to severe nonspecific diffuse or multifocal cerebral dysfunction  - propofol off since 3am 12/6  - c/w keppra 1500mg q12 maintenance, Keppra loaded 4.5g  - Pending MR brain for stroke evaluation, patient has loop recorder & MRI compatible per Radiology     #CVAs/TIAs  - Hx of TIAs in 2011 and 2013, CVAs x3 in 02/22, 8/22, and 11/22 with residual expressive aphasia   - on Eliquis and Aspirin at home for hx of stroke  - PFO work up in past negative, no evidence of PFO on MIMI X2  - c/w ASA   - c/w heparin gtt  - Restart Statin when LFTs and CPK improve per neurology  - Being worked up for Mixed Connective Tissue Disease OP- f/u p-ANCA, c-ANCA/ [id number] negative  - Neuro recs appreciated    #Thecal sac flattening   - CT thoracic and lumbar spine showing degenerative disc disease T6-T7 through L4-L5 and mild disc bulges at L2-L3 through L4-L5 that flatten the ventral thecal sac and narrowing of the bilateral neural foramina  - Will Monitor for now, will consider neuro/neurosurg evaluation in the future    PULM  #Intubated  - Intubated 12/2, extubated 12/4 in MICU  - Re-intubated 12/5 am for airway protection iso seizure on floors during RRT  - Trend ABGs for vent adjustments   - 12/5 CXR: ETT in place, no acute lung pathology  - CPAP starting 12/6    CARDIAC  #HLD  - atorvastatin on hold due to elevated CK and LFTs  - restart when possible for secondary stroke prevention    #?ASD/PFO  - Patient reportedly found to have a PFO in February 2022 during a stroke workup at [place]. Patient presented for a PFO closure in November 2022. Notably, no PFO was found at the time and no intervention was performed by Dr. Lynn.    - TTE 11/5/22 EF 57% and grossly normal LV function  - MIMI performed bedside 12/5 1 of 2 studies (+) on bubble study (uploaded to [facility])    RENAL  #Rhabdomyolysis  - CK 8720 on admission, peaked at 15k, now downtrending  - DDx: prolonged downtime myositis v hyperthermia? (T 105.4 F) v sepsis v seizure induced  - stopped IVF 12/7 since CK ~2k  - Continue checking CK daily  - BMP Q12H with Mg, Phos    GI   #Partial SBO - resolved  - CT chest, abdomen, and pelvis w/ contrast concerning for possible partial SBO without transition point.  - Surgery consulted, no acute intervention at this time  - 12/5 KUB - negative for ileus or SBO  - Hold TF for 12/7 for possible trial of intubation  - otherwise diet per nutrition    HEME/ONC  #Leukocytosis  - Concern for septic process  - Trend CBCs    ID  #Concern for sepsis  Unclear source, aspiration v less likely meningitis   BCx (12/2 NGTD repeat 12/5 NGTD) and UC 12/2 NGTD  - Patient initially presented with AMS, T 105.4, tachycardic, and tachypneic   - UA negative  - s/p vanco and zosyn x1 in ED 12/2, ceftriaxone 2g BID 12/3-12/4, vanco 12/3-12/4  - 12/7 DCed Vanc since BCx from 12/5 NGTD  - c/w zosyn for aspiration pna presumed 12/6-     ENDO  - Concern of continued exogenous testosterone use contributing to hypercoagulation?  - Hold off on endo consult until better history is obtained when patient awakes  - No acute issues    PPx/Ethics  Diet: Tube feeds via OG tube, hold prior to trial of extuation  DVT PPx: Hep gtt full AC  GOC: ongoing   ASSESSMENT & PLAN:	  52-year-old male with a PMHx significant for TIAs (2011, 2013), CVAs x3 (02/2022 s/p tPA, 8/3/2022 s/p tPA, 11/5/2022 with residual expressive aphasia) on Eliquis, and HLD BIBEMS after being found unresponsive at home on the floor, last known well 12/1 at around noon. C-collar was placed. Patient was intubated in the ED for airway protection. CTH with no acute findings, vEEG with no identified seizures. Patient was weaned off pressors, extubated, and transitioned to floors 12/4/22. 12/5 am RRT called for seizure like activity with urinary incontinence, tongue biting, and R>L posturing, patient s/p ativan 2mg x3. Anesthesia called to RRT for intubation. MICU accepting patient for seizure like activity requiring intubation.     NEURO  #AMS, Seizure like activity  EEG 12/7: concern for epileptiform activity; though EEG from 12/5 without such abnormalities.   - c/w EEG  - f/u neuro recs  - Patient found down and unresponsive at home on 12/2, last known well 12/1 at around noon. Intubated in ED 12/2, extubated in MICU 12/4  - RRT 12/5: seizure like acting with urinary incontinence, convulsions, and tongue biting; s/p ativan 2mg x3 with pauses in seizure activity following each; intubated for airway protection with rocuronium. Prolactin ~75   - CTH and CT cervical spine 12/2 negative for any acute pathologies. Tox screen on admission negative. 12/5: Stable exam from prior CThead, chronic microvascular ischemic changes, parenchymal loss, dystrophic calcification of central portion of alexis unchanged.  - Video EEG 12/3-12/4 without any seizure like activity, moderate to severe nonspecific diffuse or multifocal cerebral dysfunction  - propofol off since 3am 12/6  - c/w keppra 1500mg q12 maintenance, Keppra loaded 4.5g  - Pending MR brain for stroke evaluation, patient has loop recorder & MRI compatible per Radiology     #CVAs/TIAs  - Hx of TIAs in 2011 and 2013, CVAs x3 in 02/22, 8/22, and 11/22 with residual expressive aphasia   - on Eliquis and Aspirin at home for hx of stroke  - PFO work up in past negative, no evidence of PFO on MIMI X2  - c/w ASA   - c/w heparin gtt  - Restart Statin when LFTs and CPK improve per neurology  - Being worked up for Mixed Connective Tissue Disease OP- f/u p-ANCA, c-ANCA/ [id number] negative  - Neuro recs appreciated    #Thecal sac flattening   - CT thoracic and lumbar spine showing degenerative disc disease T6-T7 through L4-L5 and mild disc bulges at L2-L3 through L4-L5 that flatten the ventral thecal sac and narrowing of the bilateral neural foramina  - Will Monitor for now, will consider neuro/neurosurg evaluation in the future    PULM  #Intubated  - Intubated 12/2, extubated 12/4 in MICU  - Re-intubated 12/5 am for airway protection iso seizure on floors during RRT  - Trend ABGs for vent adjustments   - 12/5 CXR: ETT in place, no acute lung pathology  - CPAP starting 12/6    CARDIAC  #HLD  - atorvastatin on hold due to elevated CK and LFTs  - restart when possible for secondary stroke prevention    #?ASD/PFO  - Patient reportedly found to have a PFO in February 2022 during a stroke workup at [place]. Patient presented for a PFO closure in November 2022. Notably, no PFO was found at the time and no intervention was performed by Dr. Lynn.    - TTE 11/5/22 EF 57% and grossly normal LV function  - MIMI performed bedside 12/5 1 of 2 studies (+) on bubble study (uploaded to [facility])  - Contacted Dr. Cesar who saw patient for PFO back in 11/2022, f/u recs    RENAL  #Rhabdomyolysis  - CK 8720 on admission, peaked at 15k, now downtrending  - DDx: prolonged downtime myositis v hyperthermia? (T 105.4 F) v sepsis v seizure induced  - stopped IVF 12/7 since CK ~2k  - Continue checking CK daily  - BMP Q12H with Mg, Phos    GI   #Partial SBO - resolved  - CT chest, abdomen, and pelvis w/ contrast concerning for possible partial SBO without transition point.  - Surgery consulted, no acute intervention at this time  - 12/5 KUB - negative for ileus or SBO  - Hold TF for 12/7 for possible trial of extubation.  - otherwise diet per nutrition    HEME/ONC  #Leukocytosis  - Concern for septic process  - Trend CBCs    ID  #Concern for sepsis  Unclear source, aspiration v less likely meningitis   BCx (12/2 NGTD repeat 12/5 NGTD) and UC 12/2 NGTD  - Patient initially presented with AMS, T 105.4, tachycardic, and tachypneic   - UA negative  - s/p vanco and zosyn x1 in ED 12/2, ceftriaxone 2g BID 12/3-12/4, vanco 12/3-12/4  - 12/7 DCed Vanc since BCx from 12/5 NGTD  - c/w zosyn for aspiration pna presumed 12/6-     ENDO  - Concern of continued exogenous testosterone use contributing to hypercoagulation?  - Hold off on endo consult until better history is obtained when patient awakes  - No acute issues    PPx/Ethics  Diet: Tube feeds via OG tube, hold prior to trial of extuation  DVT PPx: Hep gtt full AC  GOC: ongoing

## 2022-12-08 NOTE — EEG REPORT - NS EEG TEXT BOX
TAMI DUNHAM [id number]     Study Date: 		12/07/22 at 08:00 - 12/08/22 at 08:00  Duration x Hours:           24 hours  --------------------------------------------------------------------------------------------------  History:  CC/ HPI Patient is a 52y old  Male who presents with a chief complaint of Unresponsive (08 Dec 2022 07:48)    MEDICATIONS  (STANDING):  aspirin  chewable 81 milliGRAM(s) Oral daily  chlorhexidine 2% Cloths 1 Application(s) Topical daily  diphtheria/tetanus/pertussis (acellular) Vaccine (Adacel) 0.5 milliLiter(s) IntraMuscular once  heparin  Infusion. 1300 Unit(s)/Hr (13 mL/Hr) IV Continuous <Continuous>  lacosamide Solution 100 milliGRAM(s) Oral two times a day  levETIRAcetam  IVPB 1500 milliGRAM(s) IV Intermittent every 12 hours  piperacillin/tazobactam IVPB.. 3.375 Gram(s) IV Intermittent every 8 hours  polyethylene glycol 3350 17 Gram(s) Oral daily  potassium chloride   Powder 40 milliEquivalent(s) Oral once    --------------------------------------------------------------------------------------------------  Study Interpretation:    [[[Abbreviation Key:  PDR=alpha rhythm/posterior dominant rhythm. A-P=anterior posterior.  Amplitude: ‘very low’:<20; ‘low’:20-49; ‘medium’:; ‘high’:>150uV.  Persistence for periodic/rhythmic patterns (% of epoch) ‘rare’:<1%; ‘occasional’:1-10%; ‘frequent’:10-50%; ‘abundant’:50-90%; ‘continuous’:>90%.  Persistence for sporadic discharges: ‘rare’:<1/hr; ‘occasional’:1/min-1/hr; ‘frequent’:>1/min; ‘abundant’:>1/10 sec.  RPP=rhythmic and periodic patterns; GRDA=generalized rhythmic delta activity; FIRDA=frontal intermittent GRDA; LRDA=lateralized rhythmic delta activity; TIRDA=temporal intermittent rhythmic delta activity;  LPD=PLED=lateralized periodic discharges; GPD=generalized periodic discharges; BIPDs =bilateral independent periodic discharges; Mf=multifocal; SIRPDs=stimulus induced rhythmic, periodic, or ictal appearing discharges; BIRDs=brief potentially ictal rhythmic discharges >4 Hz, lasting .5-10s; PFA (paroxysmal bursts >13 Hz or =8 Hz <10s).  Modifiers: +F=with fast component; +S=with spike component; +R=with rhythmic component.  S-B=burst suppression pattern.  Max=maximal. N1-drowsy; N2-stage II sleep; N3-slow wave sleep. SSS/BETS=small sharp spikes/benign epileptiform transients of sleep. HV=hyperventilation; PS=photic stimulation]]]    Daily EEG Visual Analysis    FINDINGS:      Background:  Continuity: approximately 50% suppressed (< 10 uV)  Symmetry: symmetric  Posterior dominant rhythm (PDR): None  State change: None  Voltage: abnormal  Anterior Posterior Gradient: absent  Other background findings: The predominant background consists of diffuse polymorphic 0.5-1-Hz delta frequencies, occasionally up to 3 Hz, with rare intermittent theta > beta frequencies, lasting 2-20 seconds, in between 1-20-second periods of diffuse suppression. Theta frequencies are at times more prominent on the right and at times more prominent on the left. There is occasional frontally predominant generalized rhythmic delta activity (GRDA) at approximately 1 Hz.  Breach: absent    Background Slowing:  Generalized slowing: Severe, as above  Focal slowing: none    State Changes:   None    Sporadic Epileptiform Discharges and Rhythmic and Periodic Patterns (RPPs):    Rare right frontal (Fp2) bluntly contoured sharp waves, rarely occurring as lateralized periodic discharges (LPDs) at 1-1.5 Hz.  Rare left frontal (F3) bluntly contoured sharp waves    Electrographic and Electroclinical seizures:  None    Other Clinical Events:  The event button was pressed on 12/7/22 at 22:37. There was no clinical change on video (the patient is moving RUE spontaneously and purposefully) and no epileptiform activity on EEG at this time.    Activation Procedures:   -Hyperventilation was not performed.    -Photic stimulation was not performed.    Artifacts:  Intermittent myogenic and movement artifacts were noted.    EKG:  Single-lead EKG showed regular rhythm at 60-70 bpm.    EEG Classification / Summary:  Abnormal EEG in a comatose patient due to:  -Rare right frontal bluntly contoured sharp waves, rarely occurring as lateralized periodic discharges (LPDs) at 1-1.5 Hz  -Rare left frontal bluntly contoured sharp waves  -Severe diffuse slowing, approximately 50% suppressed    Clinical Impression:  -Rare right frontal epileptiform discharges rarely occurring as lateralized periodic discharges (LPDs) at 1-1.5 Hz indicate a potentially epileptogenic focus in this region  -Rare left frontal epileptiform discharges indicate a potentially epileptogenic focus in this region  -Severe diffuse slowing indicates severe diffuse cerebral dysfunction of nonspecific etiology  -The event button was pressed on 12/7/22 at 22:37, possibly in error - there is no clinical change on video and no epileptiform activity on EEG at this time.    No electrographic seizures are captured.      -------------------------------------------------------------------------------------------------------  [facility] EEG Reading Room Ph#: [phone]  Epilepsy Answering Service after 5PM and before 8:30AM: Ph#: [phone]    Kylah Powell MD  Attending Physician, [facility]

## 2022-12-08 NOTE — PROGRESS NOTE ADULT - SUBJECTIVE AND OBJECTIVE BOX
Neurology Progress Note    S: Patient seen and examined in ICU. extubated this AM     Medication:  MEDICATIONS  (STANDING):  aspirin  chewable 81 milliGRAM(s) Oral daily  chlorhexidine 2% Cloths 1 Application(s) Topical daily  diphtheria/tetanus/pertussis (acellular) Vaccine (Adacel) 0.5 milliLiter(s) IntraMuscular once  heparin  Infusion. 1300 Unit(s)/Hr (13 mL/Hr) IV Continuous <Continuous>  lacosamide Solution 100 milliGRAM(s) Oral two times a day  levETIRAcetam  IVPB 1500 milliGRAM(s) IV Intermittent every 12 hours  piperacillin/tazobactam IVPB.. 3.375 Gram(s) IV Intermittent every 8 hours  polyethylene glycol 3350 17 Gram(s) Oral daily    MEDICATIONS  (PRN):  aluminum hydroxide/magnesium hydroxide/simethicone Suspension 30 milliLiter(s) Oral every 4 hours PRN Dyspepsia  heparin   Injectable 8000 Unit(s) IV Push every 6 hours PRN For aPTT less than 40  heparin   Injectable 4000 Unit(s) IV Push every 6 hours PRN For aPTT between 40 - 57    Vitals:    ICU Vital Signs Last 24 Hrs  T(C): 37.1 (08 Dec 2022 12:00), Max: 37.5 (07 Dec 2022 16:00)  T(F): 98.8 (08 Dec 2022 12:00), Max: 99.5 (07 Dec 2022 16:00)  HR: 75 (08 Dec 2022 14:00) (63 - 81)  BP: 144/90 (08 Dec 2022 14:00) (123/78 - 163/96)  BP(mean): 116 (08 Dec 2022 14:00) (89 - 116)  ABP: --  ABP(mean): --  RR: 18 (08 Dec 2022 14:00) (12 - 23)  SpO2: 99% (08 Dec 2022 14:00) (97% - 100%)    O2 Parameters below as of 08 Dec 2022 09:36    O2 Flow (L/min): 10  O2 Concentration (%): 40             General Exam:   General Appearance: Appropriately dressed and in no acute distress       Head: Normocephalic, atraumatic and no dysmorphic features  Ear, Nose, and Throat: Moist mucous membranes +ETT   CVS: S1S2+  Resp: No SOB, no wheeze or rhonchi  Abd: soft NTND  Extremities: No edema, no cyanosis  Skin: No bruises, no rashes     Neurological Exam:    Mental Status: Awake, Oriented x 1-2, followign some simple. minimal verbal   Cranial Nerves: PERRL, EOMI, VFFC, sensation V1-V3 intact,  no obvious facial asymmetry  + corneals, + gag    Motor: Moving uppers R>L initally now seems equal   Sensation:minimal withdrawal to noxious x 4  R>L   Reflexes: 1+ throughout at biceps, brachioradialis, triceps, patellars and ankles bilaterally and equal. No clonus. R toe and L toe were both downgoing.  Coordination: unable   Gait: unable     I personally reviewed the below data/images/labs:    CBC Full  -  ( 08 Dec 2022 03:30 )  WBC Count : 11.06 K/uL  RBC Count : 3.05 M/uL  Hemoglobin : 8.9 g/dL  Hematocrit : 26.6 %  Platelet Count - Automated : 158 K/uL  Mean Cell Volume : 87.2 fL  Mean Cell Hemoglobin : 29.2 pg  Mean Cell Hemoglobin Concentration : 33.5 gm/dL  Auto Neutrophil # : x  Auto Lymphocyte # : x  Auto Monocyte # : x  Auto Eosinophil # : x  Auto Basophil # : x  Auto Neutrophil % : x  Auto Lymphocyte % : x  Auto Monocyte % : x  Auto Eosinophil % : x  Auto Basophil % : x    12-08    139  |  103  |  11  ----------------------------<  124<H>  3.5   |  29  |  0.78    Ca    8.3<L>      08 Dec 2022 03:30  Phos  4.1     12-08  Mg     2.00     12-08    TPro  5.7<L>  /  Alb  2.8<L>  /  TBili  0.6  /  DBili  x   /  AST  72<H>  /  ALT  64<H>  /  AlkPhos  76  12-08        < from: CT Head No Cont (12.02.22 @ 20:27) >    ACC: [id number] EXAM:  CT CERVICAL SPINE                        ACC: [id number] EXAM:  CT MAXILLOFACIAL                        ACC: [id number] EXAM:  CT BRAIN                          PROCEDURE DATE:  12/02/2022        INTERPRETATION:  CLINICAL INDICATION: Trauma.    Technique: Noncontrast axial CT of the head, facial bones, and cervical   spine was performed. 3-D reformats of the facial bones was obtained.   Coronal and sagittal reformats were obtained.      COMPARISON: None.    FINDINGS:  Head CT:  The ventricles and sulci are within normal limits. 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 There is no intraparenchymal hematoma, mass effect   or midline shift. No abnormal extra-axial fluid collections or   hemorrhages are present.    There is swelling of the left lateral scalp. The calvarium is intact.   There are scattered mucosal inflammatory changes in the paranasal sinuses.      Cervical spine CT:  Alignment ismaintained. Vertebral bodies are normal in height, without   evidence of fracture or dislocation. Prevertebral soft tissues are within   normal limits without soft tissue swelling or hematoma.    Intervertebral discs are intact. Neural foramina and spinal canal are   intact.    The visualized lung apices are within normal limits.      Facial bone CT:    No acute facial fracture.    There are scattered mucosal inflammatory changes in the paranasal   sinuses. Mastoid air cells are clear.    Soft tissues appear unremarkable.        IMPRESSION:  CT HEAD: No acute abnormality.  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  CT CERVICAL SPINE: No acute abnormality  CT FACIAL BONE: No acute abnormality    --- End of Report ---       DELGADO IVAN MD; Attending Radiologist  This document has been electronically signed. Dec  2 2022  9:02PM    < end of copied text >  < from: CT Lumbar Spine No Cont (12.02.22 @ 20:27) >    ACC: [id number] EXAM:  CT LUMBAR SPINE                        ACC: [id number] EXAM:  CT THORACIC SPINE                          PROCEDURE DATE:  12/02/2022          INTERPRETATION:  CT thoracic and lumbar spine without IV contrast    CLINICAL INFORMATION:  Trauma  Back pain, fracture.    TECHNIQUE:  Contiguous axial 2 mm sections were obtained through the   thoracic and lumbar spine using a single helical acquisition.     Additional 2 mm sagittal and coronal reconstructions of the spine were   obtained. These additional reformatted images were used to evaluate the   spine for alignment, vertebral fractures and the integrity of the the   posterior elements.   This scan was performed using automatic exposure   control (radiation dose reduction software) to obtain a diagnostic image   quality scan with patient dose as low as reasonably achievable.    FINDINGS:   No prior similar studies are available for review    Thoracic and lumbar vertebral body heights are maintained. No vertebral   fracture is seen. No destructive bone lesion is found.  Alignment is   preserved.  Facet joints appear intact and aligned.    Thoracic and lumbar intervertebral disc spaces appear intact.   Degenerative disc disease and spondylosis is noted at T6-T7 throughL4-5   with loss of disc height and associated degenerative endplate changes.   Mild disc bulges at L2-3 through L4-5 flatten the ventral thecal sac and   narrow the BILATERAL neural foramina.    No paraspinal mass is recognized.  Paraspinal soft tissues appear intact.      IMPRESSION:  Degenerative disc disease and spondylosis is noted at T6-T7   through L4-5 with loss of disc height and associated degenerative   endplate changes. Mild disc bulges at L2-3 through L4-5 flatten the   ventral thecal sac and narrow the BILATERAL neural foramina   No   vertebral fracture is recognized.    --- End of Report ---       ANGIE ESCOBAR MD; Attending Radiologist  This document has been electronically signed. Dec  2 2022  8:55PM    < end of copied text >    EEG Classification / Summary:  Abnormal EEG in a comatose patient due to diffuse suppression gradually improving to discontinuous background, with right hemispheric relative attenuation/suppression. No epileptiform abnormalities or seizures are captured.    Clinical Impression:  Severe diffuse cerebral dysfunction that gradually improves is nonspecific in etiology. In this case, it may be related to sedating medication (propofol) with improvement after decreasing and stopping the medication.  Right hemispheric focal cerebral dysfunction can be structural or functional in etiology.      12/7  Clinical Impression:  -Occasional runs of right frontal lateralized periodic discharges (LPDs) at up to 1.3 Hz indicate a potentially epileptogenic focus in the right frontal region and are on the ictal-interictal continuum.  -A pattern on the ictal-interictal continuum is a pattern that does not qualify as an electrographic seizure or electrographic status epilepticus, but there is a reasonable chance that it may be contributing to impaired alertness, causing other clinical symptoms, and/or contributing to neuronal injury.  -Right hemispheric relative attenuation indicates right hemispheric focal cerebral dysfunction, which can be structural or functional in etiology.  -Rare left frontal epileptiform discharges indicate a potentially epileptogenic focus in this reigon  -Severe diffuse slowing and GRDA indicate severe diffuse cerebral dysfunction of nonspecific etiology.  -No electrographic seizures are captured.

## 2022-12-08 NOTE — CONSULT NOTE ADULT - PROBLEM SELECTOR RECOMMENDATION 4
Hx of TIAs in 2011 and 2013, CVAs x3 in 02/22, 8/22, and 11/22 with residual expressive aphasia    - was on ASA/Eliquis at home   on hep gtt

## 2022-12-08 NOTE — CONSULT NOTE ADULT - SUBJECTIVE AND OBJECTIVE BOX
CHIEF COMPLAINT:  Unresponsive    HISTORY OF PRESENT ILLNESS: 52-year-old male with a PMHx significant for TIAs (2011, 2013), CVAs x3 (02/2022 s/p tPA, 8/3/2022 s/p tPA, 11/5/2022 with residual expressive aphasia) on Eliquis, and HLD BIBEMS after being found unresponsive at home. Per patient's 2 friends in the ED, patient was last spoken to around noon on 12/1. No one had heard from him or seen him since yesterday, was not responding to calls this am, so they went to check on him today and they found him unresponsive at home with head on floor turned to the side and one leg was on the bed. Patient was snoring per his friends. Friends called EMS. EMS states he was grunting but minimally responsive. Patient arrived to the ER covered in dried blood in his mouth with multiple ecchymosis on the left side of his face, neck,, chest, and arm. Patient obtunded. Friend denies hx of ETOH or drug use. Of note, patient had a questionable ASD/PFO with possible closure on 11/22/22; per patient's friends no PFO was found and no closure was performed.  Per outpatient records, patient previously on testosterone transdermal solution which was stopped on 11/15 by his urologist. Per patient's friends at bedside, patient was recently prescribed a mail-order testosterone replacement, unsure of the name and unsure if he received and started the medication. At baseline, patient is AOx4, able to ambulate without assistance, and takes care of all ADLs without assistance.     On arrival, patient with rectal temp 105.4 F, tachy to 130s, hypertensive to 180/110, and tachypneic to 30. C-collar was placed. Patient was intubated in the ED for airway protection with etomidate and succinylcholine. Patient sedated with propofol and fentanyl. Started on vanco and zosyn. 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 CT cervical spine and maxillofacial scans negative. CT chest, abdomen, and pelvis w/ contrast concerning for possible partial SBO without transition point. Surgery consulted in ED, no acute surgical intervention at this time. CT thoracic and lumbar spine showing degenerative disc disease T6-T7 through L4-L5 and mild disc bulges at L2-L3 through L4-L5 that flatten the ventral thecal sac and narrowing of the bilateral neural foramina.    Cardiology consulted for cardiac management.  Pt last seen by us in November prior to possible PFO closure.     PAST MEDICAL & SURGICAL HISTORY:  History of TIAs    CVA (cerebrovascular accident)    HLD (hyperlipidemia)    Vertigo    No significant past surgical history    MEDICATIONS:  aspirin  chewable 81 milliGRAM(s) Oral daily  heparin   Injectable 8000 Unit(s) IV Push every 6 hours PRN  heparin   Injectable 4000 Unit(s) IV Push every 6 hours PRN  heparin  Infusion. 1300 Unit(s)/Hr IV Continuous <Continuous>    piperacillin/tazobactam IVPB.. 3.375 Gram(s) IV Intermittent every 8 hours    lacosamide Solution 100 milliGRAM(s) Oral two times a day  levETIRAcetam  IVPB 1500 milliGRAM(s) IV Intermittent every 12 hours    aluminum hydroxide/magnesium hydroxide/simethicone Suspension 30 milliLiter(s) Oral every 4 hours PRN  polyethylene glycol 3350 17 Gram(s) Oral daily    chlorhexidine 2% Cloths 1 Application(s) Topical daily  diphtheria/tetanus/pertussis (acellular) Vaccine (Adacel) 0.5 milliLiter(s) IntraMuscular once    FAMILY HISTORY:  No pertinent family history in first degree relatives    SOCIAL HISTORY:    [ ] Non-smoker  [ ] Smoker  [ ] Alcohol    Allergies    No Known Allergies    Intolerances    REVIEW OF SYSTEMS:	    [ ] All others negative	  [XX] Unable to obtain    PHYSICAL EXAM:  T(C): 37 (12-08-22 @ 08:00), Max: 37.5 (12-07-22 @ 16:00)  HR: 74 (12-08-22 @ 10:00) (63 - 81)  BP: 151/90 (12-08-22 @ 10:00) (123/78 - 152/93)  RR: 21 (12-08-22 @ 10:00) (12 - 21)  SpO2: 97% (12-08-22 @ 10:00) (97% - 100%)  Wt(kg): --  I&O's Summary    07 Dec 2022 07:01  -  08 Dec 2022 07:00  --------------------------------------------------------  IN: 2006 mL / OUT: 3245 mL / NET: -1239 mL    08 Dec 2022 07:01  -  08 Dec 2022 13:46  --------------------------------------------------------  IN: 57 mL / OUT: 700 mL / NET: -643 mL    Appearance: NAD  HEENT: dry oral mucosa, PERRL, EOMI	  Lymphatic: No lymphadenopathy  Cardiovascular: Normal S1 S2, No JVD, No murmurs, No edema  Respiratory: Decreased BS, on NC	  Psychiatry: A & O x 0, Unable to speak  Gastrointestinal: Soft, Non-tender, + BS	  Skin: No rashes, No ecchymoses, No cyanosis	  Neurologic: Follows commands  Extremities: Decreased ROM/Strength, No clubbing, cyanosis or edema  Vascular: Peripheral pulses palpable 2+ bilaterally    TELEMETRY: SR	    ECG: < from: TTE with Doppler (w/Cont) (11.05.22 @ 11:52) >  Patient name: TAMI DUNHAM  YOB: 1970   Age: 52 (M)   MR#: [id number]  Study Date: 11/5/2022  Location: [place] [address]  DSonographer: RAFFI Mckeon  Study quality: Technically good  Referring Physician:   Chrystal Viera MD  Blood Pressure: 134/86 mmHg  Height: 191 cm  Weight: 95 kg  BSA: 2.2 m2  ------------------------------------------------------------------------  PROCEDURE: Transthoracic echocardiogram with 2-D, M-Mode  and complete spectral and color flow Doppler. Real-time and  reconstructed 3-dimensional imaging was performed with  Workstation. Color Doppler analysis was carried out using  both 2D and 3D mapping. Verbal consent was obtained for  injection of  Ultrasonic Enhancing Agent following a  discussion of risks and benefits. Following intravenous  injection of Ultrasonic Enhancing Agent, harmonic imaging  was performed.  INDICATION: Other cerebrovascular disease (I67.89)  ------------------------------------------------------------------------  Dimensions:    Normal Values:  LA:     3.1    2.0 - 4.0 cm  Ao:     3.4    2.0 - 3.8 cm  SEPTUM: 1.0    0.6 - 1.2 cm  PWT:    0.9    0.6 - 1.1 cm  LVIDd:  4.5    3.0 - 5.6 cm  LVIDs:  3.2    1.8 - 4.0 cm  Derived variables:  LVMI: 64 g/m2  RWT: 0.40  Fractionalshort: 29 %  EF (3D Quantification): 57 %Doppler Peak Velocity (m/sec):  AoV=1.1  ------------------------------------------------------------------------  Observations:  Mitral Valve: Normal mitral valve.  Aortic Valve/Aorta: Normal trileaflet aortic valve. Peak  transaortic valve gradient equals 5 mm Hg. Peak left  ventricular outflow tract gradient equals 3 mm Hg, mean  gradient is equal to 2 mm Hg, LVOT velocity time integral  equals 21 cm.  Aortic Root: 3.4 cm.  Sinotubular Junction: 3 cm.  Ascending Aorta: 3.6 cm.  LVOT diameter: 2.2 cm.  Aortic arch 3.9 cm.  Left Atrium: Normal left atrium.  LA volume index = 22  cc/m2.  Left Ventricle: Normal left ventricular systolic function.  No segmental wall motion abnormalities. Normal left  ventricular internal dimensions and wall thicknesses.  Normal diastolic function  Right Heart: Normal right atrium. Normal right ventricular  size and function. Normal tricuspid valve. Minimal  tricuspid regurgitation. Normal pulmonic valve.  Pericardium/Pleura: Normal pericardium with no pericardial  effusion.  Hemodynamic: Estimated right atrial pressure is 8 mm Hg.  ------------------------------------------------------------------------  Conclusions:  1. Aortic Root: 3.4 cm.  Sinotubular Junction: 3 cm.  Ascending Aorta: 3.6 cm.  LVOT diameter: 2.2 cm.  Aortic arch 3.9 cm.  2. Normal left ventricular internal dimensions and wall  thicknesses.  3. Normal left ventricular systolic function. No segmental  wall motion abnormalities.  4. Normal diastolic function  5. Normal right ventricular size and function.  ------------------------------------------------------------------------  Confirmed on  11/5/2022 - 14:10:41 by MARY Coreas  ------------------------------------------------------------------------    < end of copied text >   	  RADIOLOGY: < from: Xray Abdomen 1 View PORTABLE -Urgent (Xray Abdomen 1 View PORTABLE -Urgent .) (12.06.22 @ 14:00) >    ACC: [id number] EXAM:  XR ABDOMEN PORTABLE URGENT 1V                          PROCEDURE DATE:  12/06/2022          INTERPRETATION:  CLINICAL INDICATION: Constipation.    TECHNIQUE: Frontal views of the abdomen.    COMPARISON: CT abdomen/pelvis 12/2/2022.    FINDINGS:    Nonobstructive bowel gas pattern.  No free air visualized.    No abnormal calcifications identified. No acute osseous abnormalities.    Enteric tube terminates in the stomach.    Low stool burden.    IMPRESSION:    Nonobstructive bowel gas pattern.    --- End of Report ---      < end of copied text >  < from: Xray Chest 1 View- PORTABLE-Urgent (Xray Chest 1 View- PORTABLE-Urgent .) (12.05.22 @ 10:01) >  ACC: [id number] EXAM:  XR CHEST PORTABLE URGENT 1V                          PROCEDURE DATE:  12/05/2022          INTERPRETATION:  Chest one view 12/5/2022 4:37 AM    HISTORY: Line placement    COMPARISON STUDY: 12/3/2022    Frontal expiratory view of the chest shows the heart to be normal in   size. Endotracheal tube reaches the lower trachea. Loop recorder is again   noted.    The lungs show minimal left atelectasis and there is no evidence of   pneumothorax nor pleural effusion.    Chest one view 12/5/2022 9:33 AM  Compared to the prior study, nasogastric tube has been advanced to the   stomach. Its sidehole is at or near the GE junction.    IMPRESSION:  Postintubation.NG tube as noted.        Thank you for the courtesy of this referral.    --- End of Report ---      < end of copied text >  < from: CT Head No Cont (12.05.22 @ 03:34) >  ACC: [id number] EXAM:  CT BRAIN                          PROCEDURE DATE:  12/05/2022          INTERPRETATION:  Clinical indication: Seizure.    Multiple axial sections were performed from base of vertex without   contrast enhancement. Coronal and sagittal reconstructions were performed    This exams compare prior head CT performed on December 2, 2022.    Parenchymal volume loss and chronic microvascular ischemic changes again   seen and unchanged.    Area of dystrophic calcification involving thecentral portion of the   alexis is again seen and unchanged as well.    There is no acute hemorrhage identified    Evaluation of the osseous structures with the appropriate window appears   unremarkable    Minimal left maxillary and bilateral ethmoid sinus mucosal thickening is   seen.    Both mastoid and middle ear regions appear clear.    IMPRESSION: Stable exam.    --- End of Report ---    < end of copied text >  < from: Xray Elbow AP + Lateral, Left (12.02.22 @ 22:49) >  ACC: [id number] EXAM:  XR HUMERUS MIN 2 VIEWS LT                        ACC: [id number] EXAM:  XR ELBOW 2 VIEWS LT                          PROCEDURE DATE:  12/02/2022          INTERPRETATION:  CLINICAL INFORMATION: Left arm pain and swelling.    EXAM:2 views of the left elbow, 2 views of the left humerus    COMPARISON: No similar prior studies available for comparison.    IMPRESSION:  Study is limited by patient positioning and overlying devices. No acute   displaced fracture or dislocation is identified.    --- End of Report ---    < end of copied text >  < from: Xray Humerus, Left (12.02.22 @ 22:49) >  ACC: [id number] EXAM:  XR HUMERUS MIN 2 VIEWS LT                        ACC: [id number] EXAM:  XR ELBOW 2 VIEWS LT                          PROCEDURE DATE:  12/02/2022          INTERPRETATION:  CLINICAL INFORMATION: Left arm pain and swelling.    EXAM:2 views of the left elbow, 2 views of the left humerus    COMPARISON: No similar prior studies available for comparison.    IMPRESSION:  Study is limited by patient positioning and overlying devices. No acute   displaced fracture or dislocation is identified.    --- End of Report ---    < end of copied text >    OTHER: 	  	  LABS:	 	    CARDIAC MARKERS: Troponin T, High Sensitivity Result: 181: PREVIOUSLY CALLED Troponin T, High Sensitivity Result: 197: TYPE:(C=Critical, N=Notification, A=Abnormal) N                         8.9    11.06 )-----------( 158      ( 08 Dec 2022 03:30 )             26.6     12-08    139  |  103  |  11  ----------------------------<  124<H>  3.5   |  29  |  0.78    Ca    8.3<L>      08 Dec 2022 03:30  Phos  4.1     12-08  Mg     2.00     12-08    TPro  5.7<L>  /  Alb  2.8<L>  /  TBili  0.6  /  DBili  x   /  AST  72<H>  /  ALT  64<H>  /  AlkPhos  76  12-08    proBNP:   Lipid Profile: Lipid Profile in AM (11.05.22 @ 06:57)   Cholesterol, Serum: 131 mg/dL   Triglycerides, Serum: 114 mg/dL   HDL Cholesterol, Serum: 34 mg/dL   Non HDL Cholesterol: 97  LDL Cholesterol Calculated: 74 mg/dL (11.05.22 @ 06:57)   HgA1c: A1C with Estimated Average Glucose Result: 5.6: Method: Immunoassay   TSH: Thyroid Stimulating Hormone, Serum: 1.30 uIU/mL (12.05.22 @ 02:30)

## 2022-12-08 NOTE — PROGRESS NOTE ADULT - ASSESSMENT
52-year-old  M known to me from outpatient setting with  TIAs (2011, 2013), Strokes x3 (02/2022 s/p tPA, 8/3/2022 s/p tPA, 11/5/2022 with residual expressive aphasia) on Eliquis, was on testosterone outpatient stopped after last stroke, HLD BIBEMS after being found unresponsive  Temp 105.4 on arrival tachy and /110. 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   CT cervical spine and maxillofacial scans negative.  CT chest, abdomen, and pelvis w/ contrast concerning for possible partial SBO without transition point. Surgery consulted in ED, no acute surgical intervention at this time.   CT thoracic and lumbar spine showing degenerative disc disease T6-T7 through L4-L5 and mild disc bulges at L2-L3 through L4-L5 that flatten the ventral thecal sac and narrowing of the bilateral neural foramina.  MRI 11/2022: R centrum semiovale and R posterior frontal infarcts   takes adderall at home   + rhabdo  EEG no seizures   + transaminitis   CTH 12/5 stable   outpatient hypercoag workup neg   12/5 extubated then reintibuated for seizure activity and tx back to ICU   EEG this AM 12/5 with only slowing   EEG 12/6 slowing but no seizures   spoke with Winter Conti (friend) who states after he was taken off the testosterone he ordered a mail order testosterone supplement, unclear if he started it yet, unclear what this was  12/7 EEG no electrogtraphic seizures captured but R LPDs, rare L frontal discharges, severe GRDA.   12/8 EEG improved.  extubated. following some commands     Impression:   1) AMS of unclear etiology, possible now seizure, related to adderral withdrawal? 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 had MIMI was question of PFO but not found. s/p ILR     - started t Vimpat 100mg BID    - keppra 1500mg BID.   - MRI brain seizure protocol (would come off eeg for this today if possible)  - fever returned.  would consider LP at this point after MRI if no source of infection found   - monitor LFTs , downtrending   - IVF  - CPK elevated. trend   - would consider endocrine   - there was some concern outpatient he may have a mixed  connective tissue disorder   - CTX and vanco  - statin therapy for secondary stroke prevention when LFTS and CPK improve   - on heparin drip    - telemetry  - PT/OT/SS/SLP, OOBC  - check FS, glucose control <180  - GI/DVT ppx  - Thank you for allowing me to participate in the care of this patient. Call with questions.   - spoke with ICU team  - spoke with Winter Conti at [phone] for more collateral info and to provide update.   - spoke [facility] ICU resident   Braxton Forde MD  Vascular Neurology  Office: [phone]

## 2022-12-08 NOTE — PROGRESS NOTE ADULT - SUBJECTIVE AND OBJECTIVE BOX
Name of Patient : TAMI DUNHAM  MRN: [id number]  Date of visit: 12-08-22       Subjective: Patient seen and examined. No new events except as noted.         MEDICATIONS:  MEDICATIONS  (STANDING):  aspirin  chewable 81 milliGRAM(s) Oral daily  chlorhexidine 2% Cloths 1 Application(s) Topical daily  diphtheria/tetanus/pertussis (acellular) Vaccine (Adacel) 0.5 milliLiter(s) IntraMuscular once  heparin  Infusion. 1300 Unit(s)/Hr (13 mL/Hr) IV Continuous <Continuous>  lacosamide IVPB 100 milliGRAM(s) IV Intermittent every 12 hours  levETIRAcetam  IVPB 1500 milliGRAM(s) IV Intermittent every 12 hours  piperacillin/tazobactam IVPB.. 3.375 Gram(s) IV Intermittent every 8 hours  polyethylene glycol 3350 17 Gram(s) Oral daily      PHYSICAL EXAM:  T(C): 36.1 (12-08-22 @ 19:00), Max: 37.1 (12-08-22 @ 12:00)  HR: 79 (12-08-22 @ 21:00) (63 - 92)  BP: 151/93 (12-08-22 @ 21:00) (123/78 - 163/96)  RR: 19 (12-08-22 @ 21:00) (12 - 28)  SpO2: 97% (12-08-22 @ 21:00) (96% - 100%)  Wt(kg): --  I&O's Summary    07 Dec 2022 07:01  -  08 Dec 2022 07:00  --------------------------------------------------------  IN: 2006 mL / OUT: 3245 mL / NET: -1239 mL    08 Dec 2022 07:01  -  08 Dec 2022 22:29  --------------------------------------------------------  IN: 266 mL / OUT: 2200 mL / NET: -1934 mL          HEENT:  PETER   Lymphatic: No lymphadenopathy   Cardiovascular: Normal S1 S2, no JVD  Respiratory: normal effort , clear  Gastrointestinal:  Soft, Non-tender  Skin: No rashes,  warm to touch  Psychiatry:  Mood & affect appropriate  Musculuskeletal: No edema      All labs, Imaging and EKGs personally reviewed       12-07-22 @ 07:01  -  12-08-22 @ 07:00  --------------------------------------------------------  IN: 2006 mL / OUT: 3245 mL / NET: -1239 mL    12-08-22 @ 07:01  -  12-08-22 @ 22:29  --------------------------------------------------------  IN: 266 mL / OUT: 2200 mL / NET: -1934 mL                          8.9    11.06 )-----------( 158      ( 08 Dec 2022 03:30 )             26.6               12-08    139  |  103  |  11  ----------------------------<  124<H>  3.5   |  29  |  0.78    Ca    8.3<L>      08 Dec 2022 03:30  Phos  4.1     12-08  Mg     2.00     12-08    TPro  5.7<L>  /  Alb  2.8<L>  /  TBili  0.6  /  DBili  x   /  AST  72<H>  /  ALT  64<H>  /  AlkPhos  76  12-08    PTT - ( 08 Dec 2022 03:30 )  PTT:69.2 sec       CARDIAC MARKERS ( 08 Dec 2022 03:30 )  x     / x     / 1461 U/L / x     / x      CARDIAC MARKERS ( 07 Dec 2022 02:55 )  x     / x     / 2704 U/L / x     / x

## 2022-12-08 NOTE — PROGRESS NOTE ADULT - ASSESSMENT
52-year-old male with a PMHx significant for TIAs (2011, 2013), CVAs x3 (02/2022 s/p tPA, 8/3/2022 s/p tPA, 11/5/2022 with residual expressive aphasia) on Eliquis, and HLD BIBEMS after being found unresponsive at home on the floor, last known well 12/1 at around noon. C-collar was placed. Patient was intubated in the ED for airway protection. CTH with no acute findings, vEEG with no identified seizures. Patient was weaned off pressors, extubated, and transitioned to floors 12/4/22. 12/5 am RRT called for seizure like activity with urinary incontinence, tongue biting, and R>L posturing, patient s/p ativan 2mg x3. Anesthesia called to RRT for intubation. MICU accepting patient for seizure like activity requiring intubation.    NEURO  #AMS, Seizure like activity  EEG 12/7: concern for epileptiform activity; though EEG from 12/5 without such abnormalities.   - c/w EEG  - f/u neuro recs  - Patient found down and unresponsive at home on 12/2, last known well 12/1 at around noon. Intubated in ED 12/2, extubated in MICU 12/4  - RRT 12/5: seizure like acting with urinary incontinence, convulsions, and tongue biting; s/p ativan 2mg x3 with pauses in seizure activity following each; intubated for airway protection with rocuronium. Prolactin ~75   - CTH and CT cervical spine 12/2 negative for any acute pathologies. Tox screen on admission negative. 12/5: Stable exam from prior CT head, chronic microvascular ischemic changes, parenchymal loss, dystrophic calcification of central portion of alexis unchanged.  - Video EEG 12/3-12/4 without any seizure like activity, moderate to severe nonspecific diffuse or multifocal cerebral dysfunction  - propofol off since 3am 12/6  - c/w keppra 1500mg q12 maintenance, Keppra loaded 4.5g  - Pending MR brain for stroke evaluation, patient has loop recorder & MRI compatible per Radiology     #CVAs/TIAs  - Hx of TIAs in 2011 and 2013, CVAs x3 in 02/22, 8/22, and 11/22 with residual expressive aphasia   - on Eliquis and Aspirin at home for hx of stroke  - PFO work up in past negative, no evidence of PFO on MIMI X2  - c/w ASA   - c/w heparin gtt  - Restart Statin when LFTs and CPK improve per neurology  - Being worked up for Mixed Connective Tissue Disease OP- f/u p-ANCA, c-ANCA/ [id number] negative  - Neuro recs appreciated    #Thecal sac flattening   - CT thoracic and lumbar spine showing degenerative disc disease T6-T7 through L4-L5 and mild disc bulges at L2-L3 through L4-L5 that flatten the ventral thecal sac and narrowing of the bilateral neural foramina  - Will Monitor for now, will consider neuro/neurosurg evaluation in the future    PULM  #Intubated  - Intubated 12/2, extubated 12/4 in MICU  - Re-intubated 12/5 am for airway protection iso seizure on floors during RRT  - Trend ABGs for vent adjustments   - 12/5 CXR: ETT in place, no acute lung pathology  - CPAP starting 12/6    CARDIAC  #HLD  - atorvastatin on hold due to elevated CK and LFTs  - restart when possible for secondary stroke prevention    #?ASD/PFO  - Patient reportedly found to have a PFO in February 2022 during a stroke workup at [facility]. Patient presented for a PFO closure in November 2022. Notably, no PFO was found at the time and no intervention was performed by Dr. Lynn.    - TTE 11/5/22 EF 57% and grossly normal LV function  - MIMI performed bedside 12/5 1 of 2 studies (+) on bubble study (uploaded to [facility])  - Contacted Dr. Cesar who saw patient for PFO back in 11/2022, f/u recs    RENAL  #Rhabdomyolysis  - CK 8720 on admission, peaked at 15k, now downtrending  - DDx: prolonged downtime myositis v hyperthermia? (T 105.4 F) v sepsis v seizure induced  - stopped IVF 12/7 since CK ~2k  - Continue checking CK daily  - BMP Q12H with Mg, Phos    GI   #Partial SBO - resolved  - CT chest, abdomen, and pelvis w/ contrast concerning for possible partial SBO without transition point.  - Surgery consulted, no acute intervention at this time  - 12/5 KUB - negative for ileus or SBO  - Hold TF for 12/7 for possible trial of extubation.  - otherwise diet per nutrition    HEME/ONC  #Leukocytosis  - Concern for septic process  - Trend CBCs    ID  #Concern for sepsis  Unclear source, aspiration v less likely meningitis   BCx (12/2 NGTD repeat 12/5 NGTD) and UC 12/2 NGTD  - Patient initially presented with AMS, T 105.4, tachycardic, and tachypneic   - UA negative  - s/p vanco and zosyn x1 in ED 12/2, ceftriaxone 2g BID 12/3-12/4, vanco 12/3-12/4  - 12/7 DCed Vanc since BCx from 12/5 NGTD  - c/w zosyn for aspiration pna presumed 12/6-

## 2022-12-08 NOTE — EEG REPORT - NS EEG TEXT BOX
TAMI DUNHAM [id number]     Study Date: 		12-08-22  Duration x Hours:   --------------------------------------------------------------------------------------------------  History:  CC/ HPI Patient is a 52y old  Male who presents with a chief complaint of Unresponsive (08 Dec 2022 07:48)    MEDICATIONS  (STANDING):  aspirin  chewable 81 milliGRAM(s) Oral daily  chlorhexidine 2% Cloths 1 Application(s) Topical daily  diphtheria/tetanus/pertussis (acellular) Vaccine (Adacel) 0.5 milliLiter(s) IntraMuscular once  heparin  Infusion. 1300 Unit(s)/Hr (13 mL/Hr) IV Continuous <Continuous>  lacosamide Solution 100 milliGRAM(s) Oral two times a day  levETIRAcetam  IVPB 1500 milliGRAM(s) IV Intermittent every 12 hours  piperacillin/tazobactam IVPB.. 3.375 Gram(s) IV Intermittent every 8 hours  polyethylene glycol 3350 17 Gram(s) Oral daily    --------------------------------------------------------------------------------------------------  Study Interpretation:    [[[Abbreviation Key:  PDR=alpha rhythm/posterior dominant rhythm. A-P=anterior posterior.  Amplitude: ‘very low’:<20; ‘low’:20-49; ‘medium’:; ‘high’:>150uV.  Persistence for periodic/rhythmic patterns (% of epoch) ‘rare’:<1%; ‘occasional’:1-10%; ‘frequent’:10-50%; ‘abundant’:50-90%; ‘continuous’:>90%.  Persistence for sporadic discharges: ‘rare’:<1/hr; ‘occasional’:1/min-1/hr; ‘frequent’:>1/min; ‘abundant’:>1/10 sec.  RPP=rhythmic and periodic patterns; GRDA=generalized rhythmic delta activity; FIRDA=frontal intermittent GRDA; LRDA=lateralized rhythmic delta activity; TIRDA=temporal intermittent rhythmic delta activity;  LPD=PLED=lateralized periodic discharges; GPD=generalized periodic discharges; BIPDs =bilateral independent periodic discharges; Mf=multifocal; SIRPDs=stimulus induced rhythmic, periodic, or ictal appearing discharges; BIRDs=brief potentially ictal rhythmic discharges >4 Hz, lasting .5-10s; PFA (paroxysmal bursts >13 Hz or =8 Hz <10s).  Modifiers: +F=with fast component; +S=with spike component; +R=with rhythmic component.  S-B=burst suppression pattern.  Max=maximal. N1-drowsy; N2-stage II sleep; N3-slow wave sleep. SSS/BETS=small sharp spikes/benign epileptiform transients of sleep. HV=hyperventilation; PS=photic stimulation]]]    Daily EEG Visual Analysis    FINDINGS:      Background:  Continuity: Approximately 75% suppressed  Symmetry: Symmetric  Posterior dominant rhythm (PDR): None  State Change: Absent  Voltage: Abnormal  Anterior Posterior Gradient: Absent  Other background findings: Intermittent diffuse polymorphic delta slowing  Breach: Absent    Background Slowing:  Generalized slowing: Severe  Focal slowing: None    State Changes:   None    Sporadic Epileptiform Discharges:    None    Rhythmic and Periodic Patterns (RPPs):  None     Electrographic and Electroclinical seizures:  None    Other Clinical Events:  None    Activation Procedures:   -Hyperventilation was not performed.    -Photic stimulation was not performed.    Artifacts:  Intermittent myogenic and movement artifacts were noted, in addition to poor impedance of some electrodes and disconnection of multiple electrodes, with artifacts progressively worsening over the course of monitoring. Artifacts limit interpretation.    EKG:  Single-lead EKG is mostly uninterpretable. During interpretable portions, it shows regular rhythm at 70 bpm.    EEG Classification / Summary:  Abnormal EEG in a comatose patient due to severe diffuse slowing. No focal or epileptiform abnormalities are captured, though interpretation is limited due to artifact.    Clinical Impression:  Severe diffuse slowing indicates diffuse cerebral dysfunction of nonspecific etiology. Interpretation is limited due to artifact.          -------------------------------------------------------------------------------------------------------  [facility] EEG Reading Room Ph#: [phone]  Epilepsy Answering Service after 5PM and before 8:30AM: Ph#: [phone]    Kylah Powell MD  Attending Physician, [facility]ilepsy [place]   TAMI DUNHAM [id number]     Study Date: 		12-08-22 at 08:00-11:14  Duration x Hours:           3 hours 14 minutes  --------------------------------------------------------------------------------------------------  History:  CC/ HPI Patient is a 52y old  Male who presents with a chief complaint of Unresponsive (08 Dec 2022 07:48)    MEDICATIONS  (STANDING):  aspirin  chewable 81 milliGRAM(s) Oral daily  chlorhexidine 2% Cloths 1 Application(s) Topical daily  diphtheria/tetanus/pertussis (acellular) Vaccine (Adacel) 0.5 milliLiter(s) IntraMuscular once  heparin  Infusion. 1300 Unit(s)/Hr (13 mL/Hr) IV Continuous <Continuous>  lacosamide Solution 100 milliGRAM(s) Oral two times a day  levETIRAcetam  IVPB 1500 milliGRAM(s) IV Intermittent every 12 hours  piperacillin/tazobactam IVPB.. 3.375 Gram(s) IV Intermittent every 8 hours  polyethylene glycol 3350 17 Gram(s) Oral daily    --------------------------------------------------------------------------------------------------  Study Interpretation:    [[[Abbreviation Key:  PDR=alpha rhythm/posterior dominant rhythm. A-P=anterior posterior.  Amplitude: ‘very low’:<20; ‘low’:20-49; ‘medium’:; ‘high’:>150uV.  Persistence for periodic/rhythmic patterns (% of epoch) ‘rare’:<1%; ‘occasional’:1-10%; ‘frequent’:10-50%; ‘abundant’:50-90%; ‘continuous’:>90%.  Persistence for sporadic discharges: ‘rare’:<1/hr; ‘occasional’:1/min-1/hr; ‘frequent’:>1/min; ‘abundant’:>1/10 sec.  RPP=rhythmic and periodic patterns; GRDA=generalized rhythmic delta activity; FIRDA=frontal intermittent GRDA; LRDA=lateralized rhythmic delta activity; TIRDA=temporal intermittent rhythmic delta activity;  LPD=PLED=lateralized periodic discharges; GPD=generalized periodic discharges; BIPDs =bilateral independent periodic discharges; Mf=multifocal; SIRPDs=stimulus induced rhythmic, periodic, or ictal appearing discharges; BIRDs=brief potentially ictal rhythmic discharges >4 Hz, lasting .5-10s; PFA (paroxysmal bursts >13 Hz or =8 Hz <10s).  Modifiers: +F=with fast component; +S=with spike component; +R=with rhythmic component.  S-B=burst suppression pattern.  Max=maximal. N1-drowsy; N2-stage II sleep; N3-slow wave sleep. SSS/BETS=small sharp spikes/benign epileptiform transients of sleep. HV=hyperventilation; PS=photic stimulation]]]    Daily EEG Visual Analysis    FINDINGS:      Background:  Continuity: Approximately 75% suppressed  Symmetry: Symmetric  Posterior dominant rhythm (PDR): None  State Change: Absent  Voltage: Abnormal  Anterior Posterior Gradient: Absent  Other background findings: Intermittent diffuse polymorphic delta slowing  Breach: Absent    Background Slowing:  Generalized slowing: Severe  Focal slowing: None    State Changes:   None    Sporadic Epileptiform Discharges:    None    Rhythmic and Periodic Patterns (RPPs):  None     Electrographic and Electroclinical seizures:  None    Other Clinical Events:  None    Activation Procedures:   -Hyperventilation was not performed.    -Photic stimulation was not performed.    Artifacts:  Intermittent myogenic and movement artifacts were noted, in addition to poor impedance of some electrodes and disconnection of multiple electrodes, with artifacts progressively worsening over the course of monitoring. Artifacts limit interpretation.    EKG:  Single-lead EKG is mostly uninterpretable. During interpretable portions, it shows regular rhythm at 70 bpm.    EEG Classification / Summary:  Abnormal EEG in a comatose patient due to severe diffuse slowing. No focal or epileptiform abnormalities are captured, though interpretation is limited due to artifact.    Clinical Impression:  Severe diffuse slowing indicates diffuse cerebral dysfunction of nonspecific etiology. Interpretation is limited due to artifact.          -------------------------------------------------------------------------------------------------------  [facility] EEG Reading Room Ph#: [phone]  Epilepsy Answering Service after 5PM and before 8:30AM: Ph#: [phone]    Kylah Powell MD  Attending Physician, [facility] Epilepsy [place]

## 2022-12-08 NOTE — PROGRESS NOTE ADULT - SUBJECTIVE AND OBJECTIVE BOX
*******************************  Hudson Russell PA-C  *******************************    INTERVAL HPI/OVERNIGHT EVENTS: Vimpat added on for high risk regions of epileptic discharges seen on vEEG. vancomycin d/c.     SUBJECTIVE: Patient seen and examined at bedside, awake still intubated sating well on CPAP. Patient not responding to yes/no questioning however, follows commands.    OBJECTIVE: Continues with persistent low grade rectal fevers. No bowel movement recorded since readmission to MICU 12/5.     SOCIAL HISTORY:  Smoking: [ ] Never Smoked [ ] Former Smoker (__ packs x ___ years) [ ] Current Smoker  (__ packs x ___ years)  Substance Use: [ ] Never Used [ ] Used ____  EtOH Use:  Marital Status: [ ] Single [ ]  [ ]  [ ]   Sexual History:   Occupation:  Recent Travel:  Country of Birth:  Advance Directives:    VITAL SIGNS:  ICU Vital Signs Last 24 Hrs  T(C): 36.8 (08 Dec 2022 04:00), Max: 37.6 (07 Dec 2022 12:00)  T(F): 98.2 (08 Dec 2022 04:00), Max: 99.6 (07 Dec 2022 12:00)  HR: 63 (08 Dec 2022 07:32) (63 - 81)  BP: 126/76 (08 Dec 2022 06:00) (124/86 - 152/93)  BP(mean): 89 (08 Dec 2022 06:00) (89 - 109)  ABP: --  ABP(mean): --  RR: 20 (08 Dec 2022 06:00) (14 - 21)  SpO2: 99% (08 Dec 2022 07:32) (95% - 100%)    O2 Parameters below as of 08 Dec 2022 04:00  Patient On (Oxygen Delivery Method): ventilator,CPAP    O2 Concentration (%): 40      Mode: CPAP with PS, FiO2: 40, PEEP: 5, PS: 5, MAP: 8    12-07 @ 07:01  -  12-08 @ 07:00  --------------------------------------------------------  IN: 2006 mL / OUT: 3245 mL / NET: -1239 mL      CAPILLARY BLOOD GLUCOSE            PHYSICAL EXAM:  GENERAL: NAD, well-developed  HEAD:  Atraumatic, Normocephalic  EYES: EOMI, PERRLA, conjunctiva and sclera clear  NECK: Supple, No JVD  CHEST/LUNG: Clear to auscultation bilaterally; No wheeze, mild crackles L >R  HEART: Regular rate and rhythm; No murmurs, rubs, or gallops  ABDOMEN: Soft, Nontender, Nondistended; Bowel sounds present  EXTREMITIES:  2+ Peripheral Pulses, No clubbing, cyanosis, or edema  : Arana in place  PSYCH: AAOx3 at baseline  NEUROLOGY: non-focal - limited as patient not fully participating however, tracks interviewer and able to squeeze fingers and wiggle toes when asked. Not participating with yes/no questioning.   SKIN: No rashes or lesions  Lines: peripheral IV       MEDICATIONS:  MEDICATIONS  (STANDING):  aspirin  chewable 81 milliGRAM(s) Oral daily  chlorhexidine 0.12% Liquid 15 milliLiter(s) Oral Mucosa every 12 hours  chlorhexidine 2% Cloths 1 Application(s) Topical daily  diphtheria/tetanus/pertussis (acellular) Vaccine (Adacel) 0.5 milliLiter(s) IntraMuscular once  heparin  Infusion. 1300 Unit(s)/Hr (13 mL/Hr) IV Continuous <Continuous>  lacosamide Solution 100 milliGRAM(s) Oral two times a day  levETIRAcetam  IVPB 1500 milliGRAM(s) IV Intermittent every 12 hours  petrolatum Ophthalmic Ointment 1 Application(s) Both EYES two times a day  piperacillin/tazobactam IVPB.. 3.375 Gram(s) IV Intermittent every 8 hours  polyethylene glycol 3350 17 Gram(s) Oral daily    MEDICATIONS  (PRN):  aluminum hydroxide/magnesium hydroxide/simethicone Suspension 30 milliLiter(s) Oral every 4 hours PRN Dyspepsia  heparin   Injectable 8000 Unit(s) IV Push every 6 hours PRN For aPTT less than 40  heparin   Injectable 4000 Unit(s) IV Push every 6 hours PRN For aPTT between 40 - 57      ALLERGIES:  Allergies    No Known Allergies    Intolerances        LABS:                        8.9    11.06 )-----------( 158      ( 08 Dec 2022 03:30 )             26.6     12-08    139  |  103  |  11  ----------------------------<  124<H>  3.5   |  29  |  0.78    Ca    8.3<L>      08 Dec 2022 03:30  Phos  4.1     12-08  Mg     2.00     12-08    TPro  5.7<L>  /  Alb  2.8<L>  /  TBili  0.6  /  DBili  x   /  AST  72<H>  /  ALT  64<H>  /  AlkPhos  76  12-08    PTT - ( 08 Dec 2022 03:30 )  PTT:69.2 sec      RADIOLOGY & ADDITIONAL TESTS: Reviewed.

## 2022-12-08 NOTE — CONSULT NOTE ADULT - PROBLEM SELECTOR RECOMMENDATION 3
found to have a PFO in February 2022    - was following with Dr. Lynn for closure  no PFO found on cardiac cath in November - no intervention needed

## 2022-12-08 NOTE — CHART NOTE - NSCHARTNOTEFT_GEN_A_CORE
: Terrie Stone MD/Viv Quinonez MD    INDICATION: critical illness    PROCEDURE:  [x ] LIMITED ECHO  [x ] LIMITED CHEST  [ ] LIMITED RETROPERITONEAL  [ ] LIMITED ABDOMINAL  [ ] LIMITED DVT  [ ] NEEDLE GUIDANCE VASCULAR  [ ] NEEDLE GUIDANCE THORACENTESIS  [ ] NEEDLE GUIDANCE PARACENTESIS  [ ] NEEDLE GUIDANCE PERICARDIOCENTESIS  [ ] OTHER    FINDINGS:  A-lines throughout, LLL consolidation   Pt poorly tolerated exam post-extubation      INTERPRETATION:  Consolidation in LLL representing atelectasis vs pneumonia : Terrie Stone MD/Viv Quinonez MD    INDICATION: critical illness    PROCEDURE:  [x ] LIMITED ECHO  [x ] LIMITED CHEST  [ ] LIMITED RETROPERITONEAL  [ ] LIMITED ABDOMINAL  [ ] LIMITED DVT  [ ] NEEDLE GUIDANCE VASCULAR  [ ] NEEDLE GUIDANCE THORACENTESIS  [ ] NEEDLE GUIDANCE PARACENTESIS  [ ] NEEDLE GUIDANCE PERICARDIOCENTESIS  [ ] OTHER    FINDINGS:  A-lines throughout, LLL consolidation   Pt poorly tolerated exam post-extubation      INTERPRETATION:  Consolidation in LLL representing atelectasis vs pneumonia.    Attending note:  Agree with above. At bedside for procedure.  RENA Daley DO, FCCP

## 2022-12-08 NOTE — ED ADULT NURSE NOTE - NSFALLRSKPASTHIST_ED_ALL_ED
Attending Assessment: 18 mo M with vomting and diarrhea and fever with oincreased losses and inability to toelrate PO, ken obtian FS, RVP, cbc, cmp, and diego dewey ns bolus and zofran and re-assess, Connor Cardenas MD
no

## 2022-12-08 NOTE — PROGRESS NOTE ADULT - CRITICAL CARE ATTENDING COMMENT
Agree with above. Seen and examined with team on rounds. Critically ill requiring frequent bedside visits. Weaning trials today. Hope for extubation. MRI brain pending. Will discuss positive bubble study with cardiology. On anticoagulation with heparin gtt. Off abx. EEG still on.

## 2022-12-08 NOTE — CONSULT NOTE ADULT - ASSESSMENT
52-year-old male with a PMHx significant for TIAs (2011, 2013), CVAs x3 (02/2022 s/p tPA, 8/3/2022 s/p tPA, 11/5/2022 with residual expressive aphasia) on Eliquis, and HLD BIBEMS after being found unresponsive at home.

## 2022-12-08 NOTE — CHART NOTE - NSCHARTNOTEFT_GEN_A_CORE
MICU Transfer Note    Transfer from: MICU    Transfer to: (  ) Medicine    (X) Telemetry     (   ) RCU        (    ) Palliative         (   ) Stroke Unit          (   ) __________________    Accepting physican: Fito Horton      MICU COURSE:   While in MICU, patient had been treated with vanc/rocephin for concern of meningitis X2 days, then transitioned to Zosyn for more likely ASP PNA event with good response. vEEG performed with Keppra load. Neuro recommended starting Vimpat with repeat vEEG on two AED with severe diffuse slowing indicates diffuse cerebral dysfunction of nonspecific etiology. Patient was intubated on this second MICU admission on 12/5 and extubated successfully on 12/8, is off all sedatives and pressors.         ASSESSMENT & PLAN:     52-year-old male with a PMHx significant for TIAs (2011, 2013), CVAs x3 (02/2022 s/p tPA, 8/3/2022 s/p tPA, 11/5/2022 with residual expressive aphasia) on Eliquis, and HLD BIBEMS after being found unresponsive at home on the floor, last known well 12/1 at around noon. C-collar was placed. Patient was intubated in the ED for airway protection. CTH with no acute findings, vEEG with no identified seizures. Patient was weaned off pressors, extubated, and transitioned to floors 12/4/22. 12/5 am RRT called for seizure like activity with urinary incontinence, tongue biting, and R>L posturing, patient s/p ativan 2mg x3. Anesthesia called to RRT for intubation. MICU accepting patient for seizure like activity requiring intubation.    NEURO  #AMS, Seizure like activity  EEG 12/7: concern for epileptiform activity; though EEG from 12/5 without such abnormalities.   - c/w EEG  - f/u neuro recs  - Patient found down and unresponsive at home on 12/2, last known well 12/1 at around noon. Intubated in ED 12/2, extubated in MICU 12/4  - RRT 12/5: seizure like acting with urinary incontinence, convulsions, and tongue biting; s/p ativan 2mg x3 with pauses in seizure activity following each; intubated for airway protection with rocuronium. Prolactin ~75   - CTH and CT cervical spine 12/2 negative for any acute pathologies. Tox screen on admission negative. 12/5: Stable exam from prior CT head, chronic microvascular ischemic changes, parenchymal loss, dystrophic calcification of central portion of alexis unchanged.  - Video EEG 12/3-12/4 without any seizure like activity, moderate to severe nonspecific diffuse or multifocal cerebral dysfunction  - propofol off since 3am 12/6  - c/w keppra 1500mg q12 maintenance, Keppra loaded 4.5g  - Pending MR brain for stroke evaluation, patient has loop recorder & MRI compatible per Radiology     #CVAs/TIAs  - Hx of TIAs in 2011 and 2013, CVAs x3 in 02/22, 8/22, and 11/22 with residual expressive aphasia   - on Eliquis and Aspirin at home for hx of stroke  - PFO work up in past negative, no evidence of PFO on MIMI X2  - c/w ASA   - c/w heparin gtt  - Restart Statin when LFTs and CPK improve per neurology  - Being worked up for Mixed Connective Tissue Disease OP- f/u p-ANCA, c-ANCA/ [id number] negative  - Neuro recs appreciated    #Thecal sac flattening   - CT thoracic and lumbar spine showing degenerative disc disease T6-T7 through L4-L5 and mild disc bulges at L2-L3 through L4-L5 that flatten the ventral thecal sac and narrowing of the bilateral neural foramina  - Will Monitor for now, will consider neuro/neurosurg evaluation in the future    PULM  #Intubated  - Intubated 12/2, extubated 12/4 in MICU  - Re-intubated 12/5 am for airway protection iso seizure on floors during RRT  - Trend ABGs for vent adjustments   - 12/5 CXR: ETT in place, no acute lung pathology  - CPAP starting 12/6    CARDIAC  #HLD  - atorvastatin on hold due to elevated CK and LFTs  - restart when possible for secondary stroke prevention    #?ASD/PFO  - Patient reportedly found to have a PFO in February 2022 during a stroke workup at [place]. Patient presented for a PFO closure in November 2022. Notably, no PFO was found at the time and no intervention was performed by Dr. Lynn.    - TTE 11/5/22 EF 57% and grossly normal LV function  - MIMI performed bedside 12/5 1 of 2 studies (+) on bubble study (uploaded to [facility])  - Contacted Dr. Cesar who saw patient for PFO back in 11/2022, f/u recs    RENAL  #Rhabdomyolysis  - CK 8720 on admission, peaked at 15k, now downtrending  - DDx: prolonged downtime myositis v hyperthermia? (T 105.4 F) v sepsis v seizure induced  - stopped IVF 12/7 since CK ~2k  - Continue checking CK daily  - BMP Q12H with Mg, Phos    GI   #Partial SBO - resolved  - CT chest, abdomen, and pelvis w/ contrast concerning for possible partial SBO without transition point.  - Surgery consulted, no acute intervention at this time  - 12/5 KUB - negative for ileus or SBO  - Hold TF for 12/7 for possible trial of extubation.  - otherwise diet per nutrition    HEME/ONC  #Leukocytosis  - Concern for septic process  - Trend CBCs    ID  #Concern for sepsis  Unclear source, aspiration v less likely meningitis   BCx (12/2 NGTD repeat 12/5 NGTD) and UC 12/2 NGTD  - Patient initially presented with AMS, T 105.4, tachycardic, and tachypneic   - UA negative  - s/p vanco and zosyn x1 in ED 12/2, ceftriaxone 2g BID 12/3-12/4, vanco 12/3-12/4  - 12/7 DCed Vanc since BCx from 12/5 NGTD  - c/w zosyn for aspiration pna presumed 12/6-     ENDO  - Concern of continued exogenous testosterone use contributing to hypercoagulation?  - Hold off on endo consult until better history is obtained when patient awakes  - No acute issues    PPx/Ethics  Diet: Tube feeds via OG tube, hold prior to trial of extubation  DVT PPx: Hep gtt full AC  GOC: ongoing        For Followup:  [ ] f/u MR head for stroke w/u  [ ] currently on heparin gtt, transition to oral or SQ AC per primarcy team  [ ] f/u Neuro recs regarding whether to pursue LP   [ ] f/u exogenous Testerone use, consider endo consult        Vital Signs Last 24 Hrs  T(C): 37.1 (08 Dec 2022 12:00), Max: 37.4 (07 Dec 2022 20:00)  T(F): 98.8 (08 Dec 2022 12:00), Max: 99.4 (07 Dec 2022 20:00)  HR: 75 (08 Dec 2022 14:00) (63 - 79)  BP: 144/90 (08 Dec 2022 14:00) (123/78 - 163/96)  BP(mean): 116 (08 Dec 2022 14:00) (89 - 116)  RR: 18 (08 Dec 2022 14:00) (12 - 23)  SpO2: 99% (08 Dec 2022 14:00) (97% - 100%)    Parameters below as of 08 Dec 2022 09:36    O2 Flow (L/min): 10  O2 Concentration (%): 40  I&O's Summary    07 Dec 2022 07:01  -  08 Dec 2022 07:00  --------------------------------------------------------  IN: 2006 mL / OUT: 3245 mL / NET: -1239 mL    08 Dec 2022 07:01  -  08 Dec 2022 17:09  --------------------------------------------------------  IN: 133 mL / OUT: 1350 mL / NET: -1217 mL        MEDICATIONS  (STANDING):  aspirin  chewable 81 milliGRAM(s) Oral daily  chlorhexidine 2% Cloths 1 Application(s) Topical daily  diphtheria/tetanus/pertussis (acellular) Vaccine (Adacel) 0.5 milliLiter(s) IntraMuscular once  heparin  Infusion. 1300 Unit(s)/Hr (13 mL/Hr) IV Continuous <Continuous>  lacosamide Solution 100 milliGRAM(s) Oral two times a day  levETIRAcetam  IVPB 1500 milliGRAM(s) IV Intermittent every 12 hours  piperacillin/tazobactam IVPB.. 3.375 Gram(s) IV Intermittent every 8 hours  polyethylene glycol 3350 17 Gram(s) Oral daily    MEDICATIONS  (PRN):  aluminum hydroxide/magnesium hydroxide/simethicone Suspension 30 milliLiter(s) Oral every 4 hours PRN Dyspepsia  heparin   Injectable 8000 Unit(s) IV Push every 6 hours PRN For aPTT less than 40  heparin   Injectable 4000 Unit(s) IV Push every 6 hours PRN For aPTT between 40 - 57        LABS                                            8.9                   Neurophils% (auto):   x      (12-08 @ 03:30):    11.06)-----------(158          Lymphocytes% (auto):  x                                             26.6                   Eosinphils% (auto):   x        Manual%: Neutrophils x    ; Lymphocytes x    ; Eosinophils x    ; Bands%: x    ; Blasts x                                    139    |  103    |  11                  Calcium: 8.3   / iCa: x      (12-08 @ 03:30)    ----------------------------<  124       Magnesium: 2.00                             3.5     |  29     |  0.78             Phosphorous: 4.1      TPro  5.7    /  Alb  2.8    /  TBili  0.6    /  DBili  x      /  AST  72     /  ALT  64     /  AlkPhos  76     08 Dec 2022 03:30    ( 12-08 @ 03:30 )   PT: x    ;   INR: x      aPTT: 69.2 sec MICU Transfer Note    Transfer from: MICU    Transfer to: (  ) Medicine    (X) Telemetry     (   ) RCU        (    ) Palliative         (   ) Stroke Unit          (   ) __________________    Accepting physican: Fito Horton      MICU COURSE:   While in MICU, patient had been treated with vanc/rocephin for concern of meningitis X2 days, then transitioned to Zosyn for more likely ASP PNA event with good response. vEEG performed with Keppra load. Neuro recommended starting Vimpat with repeat vEEG on two AED with severe diffuse slowing indicates diffuse cerebral dysfunction of nonspecific etiology. Patient was intubated on this second MICU admission on 12/5 and extubated successfully on 12/8, is off all sedatives and pressors.         ASSESSMENT & PLAN:     52-year-old male with a PMHx significant for TIAs (2011, 2013), CVAs x3 (02/2022 s/p tPA, 8/3/2022 s/p tPA, 11/5/2022 with residual expressive aphasia) on Eliquis, and HLD BIBEMS after being found unresponsive at home on the floor, last known well 12/1 at around noon. C-collar was placed. Patient was intubated in the ED for airway protection. CTH with no acute findings, vEEG with no identified seizures. Patient was weaned off pressors, extubated, and transitioned to floors 12/4/22. 12/5 am RRT called for seizure like activity with urinary incontinence, tongue biting, and R>L posturing, patient s/p ativan 2mg x3. Anesthesia called to RRT for intubation. MICU accepting patient for seizure like activity requiring intubation.    NEURO  #AMS, Seizure like activity  EEG 12/7: concern for epileptiform activity; though EEG from 12/5 without such abnormalities.   - c/w EEG  - f/u neuro recs  - Patient found down and unresponsive at home on 12/2, last known well 12/1 at around noon. Intubated in ED 12/2, extubated in MICU 12/4  - RRT 12/5: seizure like acting with urinary incontinence, convulsions, and tongue biting; s/p ativan 2mg x3 with pauses in seizure activity following each; intubated for airway protection with rocuronium. Prolactin ~75   - CTH and CT cervical spine 12/2 negative for any acute pathologies. Tox screen on admission negative. 12/5: Stable exam from prior CT head, chronic microvascular ischemic changes, parenchymal loss, dystrophic calcification of central portion of alexis unchanged.  - Video EEG 12/3-12/4 without any seizure like activity, moderate to severe nonspecific diffuse or multifocal cerebral dysfunction  - propofol off since 3am 12/6  - c/w keppra 1500mg q12 maintenance, Keppra loaded 4.5g  - Pending MR brain for stroke evaluation, patient has loop recorder & MRI compatible per Radiology     #CVAs/TIAs  - Hx of TIAs in 2011 and 2013, CVAs x3 in 02/22, 8/22, and 11/22 with residual expressive aphasia   - on Eliquis and Aspirin at home for hx of stroke  - PFO work up in past negative, no evidence of PFO on MIMI X2  - c/w ASA   - c/w heparin gtt  - Restart Statin when LFTs and CPK improve per neurology  - Being worked up for Mixed Connective Tissue Disease OP- f/u p-ANCA, c-ANCA/ [id number] negative  - Neuro recs appreciated    #Thecal sac flattening   - CT thoracic and lumbar spine showing degenerative disc disease T6-T7 through L4-L5 and mild disc bulges at L2-L3 through L4-L5 that flatten the ventral thecal sac and narrowing of the bilateral neural foramina  - Will Monitor for now, will consider neuro/neurosurg evaluation in the future    PULM  #Intubated  - Intubated 12/2, extubated 12/4 in MICU  - Re-intubated 12/5 am for airway protection iso seizure on floors during RRT  - Extubated 12/8  - Trend ABGs for vent adjustments   - 12/5 CXR: ETT in place, no acute lung pathology  - CPAP starting 12/6    CARDIAC  #HLD  - atorvastatin on hold due to elevated CK and LFTs  - restart when possible for secondary stroke prevention    #?ASD/PFO  - Patient reportedly found to have a PFO in February 2022 during a stroke workup at [place]. Patient presented for a PFO closure in November 2022. Notably, no PFO was found at the time and no intervention was performed by Dr. Lynn.    - TTE 11/5/22 EF 57% and grossly normal LV function  - MIMI performed bedside 12/5 1 of 2 studies (+) on bubble study (uploaded to [facility])  - Contacted Dr. Cesar who saw patient for PFO back in 11/2022, f/u recs    RENAL  #Rhabdomyolysis  - CK 8720 on admission, peaked at 15k, now downtrending  - DDx: prolonged downtime myositis v hyperthermia? (T 105.4 F) v sepsis v seizure induced  - stopped IVF 12/7 since CK ~2k  - Continue checking CK daily  - BMP Q12H with Mg, Phos    GI   #Partial SBO - resolved  - CT chest, abdomen, and pelvis w/ contrast concerning for possible partial SBO without transition point.  - Surgery consulted, no acute intervention at this time  - 12/5 KUB - negative for ileus or SBO  NPO for now pending s/s eval    HEME/ONC  #Leukocytosis  - Concern for septic process  - Trend CBCs    ID  #Concern for sepsis  Unclear source, aspiration v less likely meningitis   BCx (12/2 NGTD repeat 12/5 NGTD) and UC 12/2 NGTD  - Patient initially presented with AMS, T 105.4, tachycardic, and tachypneic   - UA negative  - s/p vanco and zosyn x1 in ED 12/2, ceftriaxone 2g BID 12/3-12/4, vanco 12/3-12/4  - 12/7 DCed Vanc since BCx from 12/5 NGTD  - c/w zosyn for aspiration pna presumed 12/6-     ENDO  - Concern of continued exogenous testosterone use contributing to hypercoagulation?  - Hold off on endo consult until better history is obtained when patient awakes  - No acute issues    PPx/Ethics  Diet: NPO pending s/s eval  DVT PPx: Hep gtt full AC  GOC: ongoing        For Followup:  [ ] Speech/swallow eval  [ ] f/u MR head for stroke w/u  [ ] currently on heparin gtt, transition to oral or SQ AC per primarcy team  [ ] f/u Neuro recs regarding whether to pursue LP   [ ] f/u exogenous Testerone use, consider endo consult        Vital Signs Last 24 Hrs  T(C): 37.1 (08 Dec 2022 12:00), Max: 37.4 (07 Dec 2022 20:00)  T(F): 98.8 (08 Dec 2022 12:00), Max: 99.4 (07 Dec 2022 20:00)  HR: 75 (08 Dec 2022 14:00) (63 - 79)  BP: 144/90 (08 Dec 2022 14:00) (123/78 - 163/96)  BP(mean): 116 (08 Dec 2022 14:00) (89 - 116)  RR: 18 (08 Dec 2022 14:00) (12 - 23)  SpO2: 99% (08 Dec 2022 14:00) (97% - 100%)    Parameters below as of 08 Dec 2022 09:36    O2 Flow (L/min): 10  O2 Concentration (%): 40  I&O's Summary    07 Dec 2022 07:01  -  08 Dec 2022 07:00  --------------------------------------------------------  IN: 2006 mL / OUT: 3245 mL / NET: -1239 mL    08 Dec 2022 07:01  -  08 Dec 2022 17:09  --------------------------------------------------------  IN: 133 mL / OUT: 1350 mL / NET: -1217 mL        MEDICATIONS  (STANDING):  aspirin  chewable 81 milliGRAM(s) Oral daily  chlorhexidine 2% Cloths 1 Application(s) Topical daily  diphtheria/tetanus/pertussis (acellular) Vaccine (Adacel) 0.5 milliLiter(s) IntraMuscular once  heparin  Infusion. 1300 Unit(s)/Hr (13 mL/Hr) IV Continuous <Continuous>  lacosamide Solution 100 milliGRAM(s) Oral two times a day  levETIRAcetam  IVPB 1500 milliGRAM(s) IV Intermittent every 12 hours  piperacillin/tazobactam IVPB.. 3.375 Gram(s) IV Intermittent every 8 hours  polyethylene glycol 3350 17 Gram(s) Oral daily    MEDICATIONS  (PRN):  aluminum hydroxide/magnesium hydroxide/simethicone Suspension 30 milliLiter(s) Oral every 4 hours PRN Dyspepsia  heparin   Injectable 8000 Unit(s) IV Push every 6 hours PRN For aPTT less than 40  heparin   Injectable 4000 Unit(s) IV Push every 6 hours PRN For aPTT between 40 - 57        LABS                                            8.9                   Neurophils% (auto):   x      (12-08 @ 03:30):    11.06)-----------(158          Lymphocytes% (auto):  x                                             26.6                   Eosinphils% (auto):   x        Manual%: Neutrophils x    ; Lymphocytes x    ; Eosinophils x    ; Bands%: x    ; Blasts x                                    139    |  103    |  11                  Calcium: 8.3   / iCa: x      (12-08 @ 03:30)    ----------------------------<  124       Magnesium: 2.00                             3.5     |  29     |  0.78             Phosphorous: 4.1      TPro  5.7    /  Alb  2.8    /  TBili  0.6    /  DBili  x      /  AST  72     /  ALT  64     /  AlkPhos  76     08 Dec 2022 03:30    ( 12-08 @ 03:30 )   PT: x    ;   INR: x      aPTT: 69.2 sec

## 2022-12-09 DIAGNOSIS — J69.0 PNEUMONITIS DUE TO INHALATION OF FOOD AND VOMIT: ICD-10-CM

## 2022-12-09 DIAGNOSIS — D72.829 ELEVATED WHITE BLOOD CELL COUNT, UNSPECIFIED: ICD-10-CM

## 2022-12-09 LAB
ALBUMIN SERPL ELPH-MCNC: 3.1 G/DL — LOW (ref 3.3–5)
ALP SERPL-CCNC: 86 U/L — SIGNIFICANT CHANGE UP (ref 40–120)
ALT FLD-CCNC: 75 U/L — HIGH (ref 4–41)
ANION GAP SERPL CALC-SCNC: 13 MMOL/L — SIGNIFICANT CHANGE UP (ref 7–14)
APTT BLD: 64.4 SEC — HIGH (ref 27–36.3)
AST SERPL-CCNC: 67 U/L — HIGH (ref 4–40)
BILIRUB SERPL-MCNC: 0.9 MG/DL — SIGNIFICANT CHANGE UP (ref 0.2–1.2)
BUN SERPL-MCNC: 12 MG/DL — SIGNIFICANT CHANGE UP (ref 7–23)
CALCIUM SERPL-MCNC: 8.5 MG/DL — SIGNIFICANT CHANGE UP (ref 8.4–10.5)
CHLORIDE SERPL-SCNC: 100 MMOL/L — SIGNIFICANT CHANGE UP (ref 98–107)
CO2 SERPL-SCNC: 24 MMOL/L — SIGNIFICANT CHANGE UP (ref 22–31)
CREAT SERPL-MCNC: 0.71 MG/DL — SIGNIFICANT CHANGE UP (ref 0.5–1.3)
EGFR: 110 ML/MIN/1.73M2 — SIGNIFICANT CHANGE UP
GLUCOSE SERPL-MCNC: 211 MG/DL — HIGH (ref 70–99)
HCT VFR BLD CALC: 28.1 % — LOW (ref 39–50)
HGB BLD-MCNC: 9.4 G/DL — LOW (ref 13–17)
MAGNESIUM SERPL-MCNC: 1.9 MG/DL — SIGNIFICANT CHANGE UP (ref 1.6–2.6)
MCHC RBC-ENTMCNC: 29.7 PG — SIGNIFICANT CHANGE UP (ref 27–34)
MCHC RBC-ENTMCNC: 33.5 GM/DL — SIGNIFICANT CHANGE UP (ref 32–36)
MCV RBC AUTO: 88.6 FL — SIGNIFICANT CHANGE UP (ref 80–100)
NRBC # BLD: 0 /100 WBCS — SIGNIFICANT CHANGE UP (ref 0–0)
NRBC # FLD: 0 K/UL — SIGNIFICANT CHANGE UP (ref 0–0)
PHOSPHATE SERPL-MCNC: 3.6 MG/DL — SIGNIFICANT CHANGE UP (ref 2.5–4.5)
PLATELET # BLD AUTO: 220 K/UL — SIGNIFICANT CHANGE UP (ref 150–400)
POTASSIUM SERPL-MCNC: 3.7 MMOL/L — SIGNIFICANT CHANGE UP (ref 3.5–5.3)
POTASSIUM SERPL-SCNC: 3.7 MMOL/L — SIGNIFICANT CHANGE UP (ref 3.5–5.3)
PROT SERPL-MCNC: 6.2 G/DL — SIGNIFICANT CHANGE UP (ref 6–8.3)
RBC # BLD: 3.17 M/UL — LOW (ref 4.2–5.8)
RBC # FLD: 13.8 % — SIGNIFICANT CHANGE UP (ref 10.3–14.5)
SODIUM SERPL-SCNC: 137 MMOL/L — SIGNIFICANT CHANGE UP (ref 135–145)
WBC # BLD: 14.27 K/UL — HIGH (ref 3.8–10.5)
WBC # FLD AUTO: 14.27 K/UL — HIGH (ref 3.8–10.5)

## 2022-12-09 PROCEDURE — 99221 1ST HOSP IP/OBS SF/LOW 40: CPT

## 2022-12-09 PROCEDURE — 70553 MRI BRAIN STEM W/O & W/DYE: CPT | Mod: 26

## 2022-12-09 RX ORDER — DEXTROSE 50 % IN WATER 50 %
25 SYRINGE (ML) INTRAVENOUS ONCE
Refills: 0 | Status: DISCONTINUED | OUTPATIENT
Start: 2022-12-09 | End: 2023-01-11

## 2022-12-09 RX ORDER — GLUCAGON INJECTION, SOLUTION 0.5 MG/.1ML
1 INJECTION, SOLUTION SUBCUTANEOUS ONCE
Refills: 0 | Status: DISCONTINUED | OUTPATIENT
Start: 2022-12-09 | End: 2023-01-11

## 2022-12-09 RX ORDER — DEXTROSE 50 % IN WATER 50 %
15 SYRINGE (ML) INTRAVENOUS ONCE
Refills: 0 | Status: DISCONTINUED | OUTPATIENT
Start: 2022-12-09 | End: 2023-01-11

## 2022-12-09 RX ORDER — SODIUM CHLORIDE 9 MG/ML
1000 INJECTION, SOLUTION INTRAVENOUS
Refills: 0 | Status: DISCONTINUED | OUTPATIENT
Start: 2022-12-09 | End: 2022-12-14

## 2022-12-09 RX ORDER — DEXTROSE 50 % IN WATER 50 %
12.5 SYRINGE (ML) INTRAVENOUS ONCE
Refills: 0 | Status: DISCONTINUED | OUTPATIENT
Start: 2022-12-09 | End: 2023-01-11

## 2022-12-09 RX ORDER — POTASSIUM CHLORIDE 20 MEQ
40 PACKET (EA) ORAL ONCE
Refills: 0 | Status: DISCONTINUED | OUTPATIENT
Start: 2022-12-09 | End: 2022-12-10

## 2022-12-09 RX ADMIN — CHLORHEXIDINE GLUCONATE 1 APPLICATION(S): 213 SOLUTION TOPICAL at 17:48

## 2022-12-09 RX ADMIN — LACOSAMIDE 120 MILLIGRAM(S): 50 TABLET ORAL at 21:47

## 2022-12-09 RX ADMIN — SODIUM CHLORIDE 50 MILLILITER(S): 9 INJECTION, SOLUTION INTRAVENOUS at 21:47

## 2022-12-09 RX ADMIN — PIPERACILLIN AND TAZOBACTAM 25 GRAM(S): 4; .5 INJECTION, POWDER, LYOPHILIZED, FOR SOLUTION INTRAVENOUS at 23:11

## 2022-12-09 RX ADMIN — HEPARIN SODIUM 1900 UNIT(S)/HR: 5000 INJECTION INTRAVENOUS; SUBCUTANEOUS at 07:50

## 2022-12-09 RX ADMIN — LACOSAMIDE 120 MILLIGRAM(S): 50 TABLET ORAL at 06:25

## 2022-12-09 RX ADMIN — HEPARIN SODIUM 1900 UNIT(S)/HR: 5000 INJECTION INTRAVENOUS; SUBCUTANEOUS at 08:18

## 2022-12-09 RX ADMIN — LEVETIRACETAM 400 MILLIGRAM(S): 250 TABLET, FILM COATED ORAL at 06:08

## 2022-12-09 RX ADMIN — HEPARIN SODIUM 1900 UNIT(S)/HR: 5000 INJECTION INTRAVENOUS; SUBCUTANEOUS at 08:20

## 2022-12-09 RX ADMIN — LEVETIRACETAM 400 MILLIGRAM(S): 250 TABLET, FILM COATED ORAL at 18:08

## 2022-12-09 RX ADMIN — PIPERACILLIN AND TAZOBACTAM 25 GRAM(S): 4; .5 INJECTION, POWDER, LYOPHILIZED, FOR SOLUTION INTRAVENOUS at 06:30

## 2022-12-09 RX ADMIN — PIPERACILLIN AND TAZOBACTAM 25 GRAM(S): 4; .5 INJECTION, POWDER, LYOPHILIZED, FOR SOLUTION INTRAVENOUS at 15:39

## 2022-12-09 NOTE — CONSULT NOTE ADULT - SUBJECTIVE AND OBJECTIVE BOX
TAMI DUNHAM 52y Male  MRN-[id number]    Patient is a 52y old  Male who presents with a chief complaint of Unresponsive (09 Dec 2022 08:37)      HPI:  52-year-old male with a PMHx significant for TIAs (2011, 2013), CVAs x3 (02/2022 s/p tPA, 8/3/2022 s/p tPA, 11/5/2022 with residual expressive aphasia) on Eliquis, and HLD BIBEMS after being found unresponsive at home. Per patient's 2 friends in the ED, patient was last spoken to around noon on 12/1. No one had heard from him or seen him since yesterday, was not responding to calls this am, so they went to check on him today and they found him unresponsive at home with head on floor turned to the side and one leg was on the bed. Patient was snoring per his friends. Friends called EMS. EMS states he was grunting but minimally responsive. Patient arrived to the ER covered in dried blood in his mouth with multiple ecchymosis on the left side of his face, neck,, chest, and arm. Patient obtunded. Friend denies hx of ETOH or drug use. Of note, patient had a questionable ASD/PFO with possible closure on 11/22/22; per patient's friends no PFO was found and no closure was performed.  Per outpatient records, patient previously on testosterone transdermal solution which was stopped on 11/15 by his urologist. Per patient's friends at bedside, patient was recently prescribed a mail-order testosterone replacement, unsure of the name and unsure if he received and started the medication. At baseline, patient is AOx4, able to ambulate without assistance, and takes care of all ADLs without assistance.     On arrival, patient with rectal temp 105.4 F, tachy to 130s, hypertensive to 180/110, and tachypneic to 30. C-collar was placed. Patient was intubated in the ED for airway protection with etomidate and succinylcholine. Patient sedated with propofol and fentanyl. Started on vanco and zosyn. 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 CT cervical spine and maxillofacial scans negative. CT chest, abdomen, and pelvis w/ contrast concerning for possible partial SBO without transition point. Surgery consulted in ED, no acute surgical intervention at this time. CT thoracic and lumbar spine showing degenerative disc disease T6-T7 through L4-L5 and mild disc bulges at L2-L3 through L4-L5 that flatten the ventral thecal sac and narrowing of the bilateral neural foramina.   (02 Dec 2022 22:45)      PAST MEDICAL & SURGICAL HISTORY:  History of TIAs      CVA (cerebrovascular accident)      HLD (hyperlipidemia)      Vertigo      Unresponsiveness      No significant past surgical history          Allergies    No Known Allergies    Intolerances        ANTIMICROBIALS:  piperacillin/tazobactam IVPB.. 3.375 every 8 hours      MEDICATIONS  (STANDING):  aspirin  chewable 81 milliGRAM(s) Oral daily  chlorhexidine 2% Cloths 1 Application(s) Topical daily  diphtheria/tetanus/pertussis (acellular) Vaccine (Adacel) 0.5 milliLiter(s) IntraMuscular once  heparin  Infusion. 1300 Unit(s)/Hr (13 mL/Hr) IV Continuous <Continuous>  lacosamide IVPB 100 milliGRAM(s) IV Intermittent every 12 hours  levETIRAcetam  IVPB 1500 milliGRAM(s) IV Intermittent every 12 hours  piperacillin/tazobactam IVPB.. 3.375 Gram(s) IV Intermittent every 8 hours  polyethylene glycol 3350 17 Gram(s) Oral daily  potassium chloride   Powder 40 milliEquivalent(s) Oral once      Social History  Smoking:  Etoh:  Drug use:      FAMILY HISTORY:  No pertinent family history in first degree relatives        Vital Signs Last 24 Hrs  T(C): 37.2 (09 Dec 2022 06:00), Max: 37.2 (09 Dec 2022 06:00)  T(F): 98.9 (09 Dec 2022 06:00), Max: 98.9 (09 Dec 2022 06:00)  HR: 77 (09 Dec 2022 06:00) (70 - 92)  BP: 139/90 (09 Dec 2022 06:00) (134/95 - 165/99)  BP(mean): 129 (09 Dec 2022 00:00) (102 - 129)  RR: 18 (09 Dec 2022 06:00) (17 - 28)  SpO2: 97% (09 Dec 2022 06:00) (96% - 100%)    Parameters below as of 09 Dec 2022 06:00  Patient On (Oxygen Delivery Method): room air        CBC Full  -  ( 09 Dec 2022 07:00 )  WBC Count : 14.27 K/uL  RBC Count : 3.17 M/uL  Hemoglobin : 9.4 g/dL  Hematocrit : 28.1 %  Platelet Count - Automated : 220 K/uL  Mean Cell Volume : 88.6 fL  Mean Cell Hemoglobin : 29.7 pg  Mean Cell Hemoglobin Concentration : 33.5 gm/dL  Auto Neutrophil # : x  Auto Lymphocyte # : x  Auto Monocyte # : x  Auto Eosinophil # : x  Auto Basophil # : x  Auto Neutrophil % : x  Auto Lymphocyte % : x  Auto Monocyte % : x  Auto Eosinophil % : x  Auto Basophil % : x    12-09    137  |  100  |  12  ----------------------------<  211<H>  3.7   |  24  |  0.71    Ca    8.5      09 Dec 2022 07:00  Phos  3.6     12-09  Mg     1.90     12-09    TPro  6.2  /  Alb  3.1<L>  /  TBili  0.9  /  DBili  x   /  AST  67<H>  /  ALT  75<H>  /  AlkPhos  86  12-09    LIVER FUNCTIONS - ( 09 Dec 2022 07:00 )  Alb: 3.1 g/dL / Pro: 6.2 g/dL / ALK PHOS: 86 U/L / ALT: 75 U/L / AST: 67 U/L / GGT: x               MICROBIOLOGY:  .Blood Blood-Venous  12-05-22   No growth to date.  --  --      .Blood Blood-Peripheral  12-02-22   No Growth Final  --  --      .Blood Blood-Peripheral  12-02-22   No Growth Final  --  --      Catheterized Catheterized  12-02-22   <10,000 CFU/mL Normal Urogenital Narda  --  --              v    Rapid RVP Result: NotDete (12-07 @ 07:04)            RADIOLOGY   TAMI DUNHAM 52y Male  MRN-[id number]    Patient is a 52y old  Male who presents with a chief complaint of Unresponsive (09 Dec 2022 08:37)      HPI:  52-year-old male with a PMHx significant for TIAs (2011, 2013), CVAs x3 (02/2022 s/p tPA, 8/3/2022 s/p tPA, 11/5/2022 with residual expressive aphasia) on Eliquis, and HLD BIBEMS after being found unresponsive at home. Per patient's 2 friends in the ED, patient was last spoken to around noon on 12/1. No one had heard from him or seen him since yesterday, was not responding to calls this am, so they went to check on him today and they found him unresponsive at home with head on floor turned to the side and one leg was on the bed. Patient was snoring per his friends. Friends called EMS. EMS states he was grunting but minimally responsive. Patient arrived to the ER covered in dried blood in his mouth with multiple ecchymosis on the left side of his face, neck,, chest, and arm. Patient obtunded. Friend denies hx of ETOH or drug use. Of note, patient had a questionable ASD/PFO with possible closure on 11/22/22; per patient's friends no PFO was found and no closure was performed.  Per outpatient records, patient previously on testosterone transdermal solution which was stopped on 11/15 by his urologist. Per patient's friends at bedside, patient was recently prescribed a mail-order testosterone replacement, unsure of the name and unsure if he received and started the medication. At baseline, patient is AOx4, able to ambulate without assistance, and takes care of all ADLs without assistance.     On arrival, patient with rectal temp 105.4 F, tachy to 130s, hypertensive to 180/110, and tachypneic to 30. C-collar was placed. Patient was intubated in the ED for airway protection with etomidate and succinylcholine. Patient sedated with propofol and fentanyl. Started on vanco and zosyn. 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 CT cervical spine and maxillofacial scans negative. CT chest, abdomen, and pelvis w/ contrast concerning for possible partial SBO without transition point. Surgery consulted in ED, no acute surgical intervention at this time. CT thoracic and lumbar spine showing degenerative disc disease T6-T7 through L4-L5 and mild disc bulges at L2-L3 through L4-L5 that flatten the ventral thecal sac and narrowing of the bilateral neural foramina.   (02 Dec 2022 22:45)    No fever    PAST MEDICAL & SURGICAL HISTORY:  History of TIAs      CVA (cerebrovascular accident)      HLD (hyperlipidemia)      Vertigo      Unresponsiveness      No significant past surgical history          Allergies    No Known Allergies    Intolerances        ANTIMICROBIALS:  piperacillin/tazobactam IVPB.. 3.375 every 8 hours      MEDICATIONS  (STANDING):  aspirin  chewable 81 milliGRAM(s) Oral daily  chlorhexidine 2% Cloths 1 Application(s) Topical daily  diphtheria/tetanus/pertussis (acellular) Vaccine (Adacel) 0.5 milliLiter(s) IntraMuscular once  heparin  Infusion. 1300 Unit(s)/Hr (13 mL/Hr) IV Continuous <Continuous>  lacosamide IVPB 100 milliGRAM(s) IV Intermittent every 12 hours  levETIRAcetam  IVPB 1500 milliGRAM(s) IV Intermittent every 12 hours  piperacillin/tazobactam IVPB.. 3.375 Gram(s) IV Intermittent every 8 hours  polyethylene glycol 3350 17 Gram(s) Oral daily  potassium chloride   Powder 40 milliEquivalent(s) Oral once      Social History  Smoking: no  Etoh: social   Drug use: no      FAMILY HISTORY: grandmother - cancer         Vital Signs Last 24 Hrs  T(C): 37.2 (09 Dec 2022 06:00), Max: 37.2 (09 Dec 2022 06:00)  T(F): 98.9 (09 Dec 2022 06:00), Max: 98.9 (09 Dec 2022 06:00)  HR: 77 (09 Dec 2022 06:00) (70 - 92)  BP: 139/90 (09 Dec 2022 06:00) (134/95 - 165/99)  BP(mean): 129 (09 Dec 2022 00:00) (102 - 129)  RR: 18 (09 Dec 2022 06:00) (17 - 28)  SpO2: 97% (09 Dec 2022 06:00) (96% - 100%)    Parameters below as of 09 Dec 2022 06:00  Patient On (Oxygen Delivery Method): room air        CBC Full  -  ( 09 Dec 2022 07:00 )  WBC Count : 14.27 K/uL  RBC Count : 3.17 M/uL  Hemoglobin : 9.4 g/dL  Hematocrit : 28.1 %  Platelet Count - Automated : 220 K/uL  Mean Cell Volume : 88.6 fL  Mean Cell Hemoglobin : 29.7 pg  Mean Cell Hemoglobin Concentration : 33.5 gm/dL  Auto Neutrophil # : x  Auto Lymphocyte # : x  Auto Monocyte # : x  Auto Eosinophil # : x  Auto Basophil # : x  Auto Neutrophil % : x  Auto Lymphocyte % : x  Auto Monocyte % : x  Auto Eosinophil % : x  Auto Basophil % : x    12-09    137  |  100  |  12  ----------------------------<  211<H>  3.7   |  24  |  0.71    Ca    8.5      09 Dec 2022 07:00  Phos  3.6     12-09  Mg     1.90     12-09    TPro  6.2  /  Alb  3.1<L>  /  TBili  0.9  /  DBili  x   /  AST  67<H>  /  ALT  75<H>  /  AlkPhos  86  12-09    LIVER FUNCTIONS - ( 09 Dec 2022 07:00 )  Alb: 3.1 g/dL / Pro: 6.2 g/dL / ALK PHOS: 86 U/L / ALT: 75 U/L / AST: 67 U/L / GGT: x               MICROBIOLOGY:  .Blood Blood-Venous  12-05-22   No growth to date.  --  --      .Blood Blood-Peripheral  12-02-22   No Growth Final  --  --      .Blood Blood-Peripheral  12-02-22   No Growth Final  --  --      Catheterized Catheterized  12-02-22   <10,000 CFU/mL Normal Urogenital Narda  --  --    Rapid RVP Result: NotDetec (12-07 @ 07:04)      RADIOLOGY  < from: Xray Abdomen 1 View PORTABLE -Urgent (Xray Abdomen 1 View PORTABLE -Urgent .) (12.06.22 @ 14:00) >  Nonobstructive bowel gas pattern.    < end of copied text >

## 2022-12-09 NOTE — CHART NOTE - NSCHARTNOTEFT_GEN_A_CORE
MAR Accept Note  Transfer to: Telemetry  Accepting Attending Physician: Dr. Diez    Assigned Room:  462B    Patient seen and examined.   Labs and data reviewed.   No findings precluding transfer of service.       HPI/MICU COURSE:   Please refer to MICU transfer note for full details. Briefly, this is a 52-year-old male with a PMHx significant for TIAs (2011, 2013), CVAs x3 (02/2022 s/p tPA, 8/3/2022 s/p tPA, 11/5/2022 with residual expressive aphasia) on Eliquis, and HLD BIBEMS after being found unresponsive at home on the floor, last known well 12/1 at around noon. C-collar was placed. Patient was intubated in the ED for airway protection. CTH with no acute findings, vEEG with no identified seizures. Patient was weaned off pressors, extubated, and transitioned to floors 12/4/22. On 12/5 RRT called for seizure like activity with urinary incontinence, tongue biting, and R>L posturing, patient s/p ativan 2mg x3. Anesthesia called to RRT for intubation.    While in MICU, patient treated with vanc/rocephin for concern of meningitis X2 days, then transitioned to Zosyn for more likely ASP PNA event with good response. vEEG performed with Keppra load. Neuro recommended starting Vimpat with repeat vEEG on two AED with severe diffuse slowing indicates diffuse cerebral dysfunction of nonspecific etiology. Patient was extubated successfully on 12/8, is off all sedatives and pressors.     FOR FOLLOW-UP:  [ ] Speech/swallow eval  [ ] f/u MR head for stroke w/u  [ ] currently on heparin gtt, transition to oral or SQ AC per primary team  [ ] f/u Neuro recs regarding whether to pursue LP   [ ] f/u exogenous Testerone use, consider endo consult    Shaheen Ledesma MD  Internal Medicine PGY-3

## 2022-12-09 NOTE — SWALLOW BEDSIDE ASSESSMENT ADULT - ORAL PHASE
instances of bolus holding requiring verbal cues to initiate swallow/Delayed oral transit time Delayed oral transit time

## 2022-12-09 NOTE — PROGRESS NOTE ADULT - SUBJECTIVE AND OBJECTIVE BOX
Subjective: Patient seen and examined. No new events except as noted.   Communicative this am.     REVIEW OF SYSTEMS:    CONSTITUTIONAL: + weakness, fevers or chills  EYES/ENT: No visual changes;  No vertigo or throat pain   NECK: No pain or stiffness  RESPIRATORY: No cough, wheezing, hemoptysis; No shortness of breath  CARDIOVASCULAR: No chest pain or palpitations  GASTROINTESTINAL: No abdominal or epigastric pain. No nausea, vomiting, or hematemesis; No diarrhea or constipation. No melena or hematochezia.  GENITOURINARY: No dysuria, frequency or hematuria  NEUROLOGICAL: No numbness or weakness  SKIN: No itching, burning, rashes, or lesions   All other review of systems is negative unless indicated above.    MEDICATIONS:  MEDICATIONS  (STANDING):  aspirin  chewable 81 milliGRAM(s) Oral daily  bisacodyl Suppository 10 milliGRAM(s) Rectal daily  chlorhexidine 2% Cloths 1 Application(s) Topical daily  dextrose 5% + sodium chloride 0.9%. 1000 milliLiter(s) (50 mL/Hr) IV Continuous <Continuous>  diphtheria/tetanus/pertussis (acellular) Vaccine (Adacel) 0.5 milliLiter(s) IntraMuscular once  heparin  Infusion. 1300 Unit(s)/Hr (13 mL/Hr) IV Continuous <Continuous>  lacosamide IVPB 100 milliGRAM(s) IV Intermittent every 12 hours  levETIRAcetam  IVPB 1500 milliGRAM(s) IV Intermittent every 12 hours  piperacillin/tazobactam IVPB.. 3.375 Gram(s) IV Intermittent every 8 hours  polyethylene glycol 3350 17 Gram(s) Oral daily  potassium chloride   Powder 40 milliEquivalent(s) Oral once    PHYSICAL EXAM:  T(C): 37.6 (12-09-22 @ 11:54), Max: 37.6 (12-09-22 @ 11:54)  HR: 86 (12-09-22 @ 11:54) (74 - 92)  BP: 156/88 (12-09-22 @ 11:54) (134/95 - 165/99)  RR: 18 (12-09-22 @ 11:54) (18 - 28)  SpO2: 96% (12-09-22 @ 11:54) (96% - 100%)  Wt(kg): --  I&O's Summary    08 Dec 2022 07:01  -  09 Dec 2022 07:00  --------------------------------------------------------  IN: 304 mL / OUT: 2200 mL / NET: -1896 mL    Appearance: NAD  HEENT: dry oral mucosa, PERRL, EOMI - L temporal abrasion	  Lymphatic: No lymphadenopathy , no edema  Cardiovascular: Normal S1 S2, No JVD, No murmurs , Peripheral pulses palpable 2+ bilaterally  Respiratory: Coarse BS	  Gastrointestinal:  Soft, Non-tender, + BS	  Skin: No rashes, + ecchymoses, No cyanosis, warm to touch - BL mittes  Neurological Exam:    Mental Status: Awake, Oriented x 1-2, followign some simple. minimal verbal saying few words,   Cranial Nerves: PERRL, EOMI, VFFC, sensation V1-V3 intact,  no obvious facial asymmetry    Motor: Moving uppers > lowers. uppers at least 3-4/5 in mittens   Sensation:minimal withdrawal to noxious x 4  R>L   Reflexes: 1+ throughout at biceps, brachioradialis, triceps, patellars and ankles bilaterally and equal. No clonus. R toe and L toe were both downgoing.  Coordination: unable   Gait: unable   Ext: No edema    LABS:    CARDIAC MARKERS:  CARDIAC MARKERS ( 08 Dec 2022 03:30 )  x     / x     / 1461 U/L / x     / x      CARDIAC MARKERS ( 07 Dec 2022 02:55 )  x     / x     / 2704 U/L / x     / x      CARDIAC MARKERS ( 06 Dec 2022 13:30 )  x     / x     / 3693 U/L / x     / x                            9.4    14.27 )-----------( 220      ( 09 Dec 2022 07:00 )             28.1     12-09    137  |  100  |  12  ----------------------------<  211<H>  3.7   |  24  |  0.71    Ca    8.5      09 Dec 2022 07:00  Phos  3.6     12-09  Mg     1.90     12-09    TPro  6.2  /  Alb  3.1<L>  /  TBili  0.9  /  DBili  x   /  AST  67<H>  /  ALT  75<H>  /  AlkPhos  86  12-09    proBNP:   Lipid Profile:   HgA1c:   TSH:     TELEMETRY: 	    ECG:  	  RADIOLOGY:   DIAGNOSTIC TESTING:  [ ] Echocardiogram:  [ ]  Catheterization:  [ ] Stress Test:    OTHER:

## 2022-12-09 NOTE — CONSULT NOTE ADULT - PROBLEM SELECTOR RECOMMENDATION 9
-better  -s/p micu stay  -on zosyn - can complete today and stop  -on day 7 of abx  -asp precautions   -monitor pulse ox  -no fever
found unresponsive at home   CTH with no acute findings  initially vEEG with no identified seizures  intubated 12/2, extubated 12/4  pending MRI H  check TTE

## 2022-12-09 NOTE — CONSULT NOTE ADULT - PROBLEM SELECTOR RECOMMENDATION 2
-possibly from aspiration  -monitor cbc  -abx as above
12/5 RRT for seizure like activity    - reintubated  12/7 vEEG concern for epileptiform activity  extubated 12/8  pending MRI H   c/w meds as ordered  management as per ICU

## 2022-12-09 NOTE — CHART NOTE - NSCHARTNOTEFT_GEN_A_CORE
Spoke with pt's mother Deepthi Simeon who states that they live in [place] and will be on the plane to [place] in next couple of day. Ms Lacey asked for primary team to update patient's close friend Winter Conti and Ms Conti can also give consent if unable to reach Ms Lacey Cailin Conti updated

## 2022-12-09 NOTE — PROGRESS NOTE ADULT - SUBJECTIVE AND OBJECTIVE BOX
Neurology Progress Note    S: Patient seen and examined , now on floor     Medication:  MEDICATIONS  (STANDING):  aspirin  chewable 81 milliGRAM(s) Oral daily  chlorhexidine 2% Cloths 1 Application(s) Topical daily  diphtheria/tetanus/pertussis (acellular) Vaccine (Adacel) 0.5 milliLiter(s) IntraMuscular once  heparin  Infusion. 1300 Unit(s)/Hr (13 mL/Hr) IV Continuous <Continuous>  lacosamide IVPB 100 milliGRAM(s) IV Intermittent every 12 hours  levETIRAcetam  IVPB 1500 milliGRAM(s) IV Intermittent every 12 hours  piperacillin/tazobactam IVPB.. 3.375 Gram(s) IV Intermittent every 8 hours  polyethylene glycol 3350 17 Gram(s) Oral daily    MEDICATIONS  (PRN):  aluminum hydroxide/magnesium hydroxide/simethicone Suspension 30 milliLiter(s) Oral every 4 hours PRN Dyspepsia  heparin   Injectable 8000 Unit(s) IV Push every 6 hours PRN For aPTT less than 40  heparin   Injectable 4000 Unit(s) IV Push every 6 hours PRN For aPTT between 40 - 57    Vitals:    Vital Signs Last 24 Hrs  T(C): 36.8 (12-09-22 @ 01:46), Max: 37.1 (12-08-22 @ 12:00)  T(F): 98.3 (12-09-22 @ 01:46), Max: 98.8 (12-08-22 @ 12:00)  HR: 75 (12-09-22 @ 01:46) (70 - 92)  BP: 147/90 (12-09-22 @ 01:46) (134/95 - 165/99)  BP(mean): 129 (12-09-22 @ 00:00) (102 - 129)  RR: 18 (12-09-22 @ 01:46) (17 - 28)  SpO2: 99% (12-09-22 @ 01:46) (96% - 100%)          General Exam:   General Appearance: Appropriately dressed and in no acute distress       Head: Normocephalic, atraumatic and no dysmorphic features  Ear, Nose, and Throat: Moist mucous membranes    CVS: S1S2+  Resp: No SOB, no wheeze or rhonchi  Abd: soft NTND  Extremities: No edema, no cyanosis  Skin: No bruises, no rashes     Neurological Exam:    Mental Status: Awake, Oriented x 1-2, followign some simple. minimal verbal saying few words,   Cranial Nerves: PERRL, EOMI, VFFC, sensation V1-V3 intact,  no obvious facial asymmetry    Motor: Moving uppers > lowers. uppers at least 3-4/5 in mittens   Sensation:minimal withdrawal to noxious x 4  R>L   Reflexes: 1+ throughout at biceps, brachioradialis, triceps, patellars and ankles bilaterally and equal. No clonus. R toe and L toe were both downgoing.  Coordination: unable   Gait: unable     I personally reviewed the below data/images/labs:    CBC Full  -  ( 09 Dec 2022 07:00 )  WBC Count : 14.27 K/uL  RBC Count : 3.17 M/uL  Hemoglobin : 9.4 g/dL  Hematocrit : 28.1 %  Platelet Count - Automated : 220 K/uL  Mean Cell Volume : 88.6 fL  Mean Cell Hemoglobin : 29.7 pg  Mean Cell Hemoglobin Concentration : 33.5 gm/dL  Auto Neutrophil # : x  Auto Lymphocyte # : x  Auto Monocyte # : x  Auto Eosinophil # : x  Auto Basophil # : x  Auto Neutrophil % : x  Auto Lymphocyte % : x  Auto Monocyte % : x  Auto Eosinophil % : x  Auto Basophil % : x    12-08    139  |  103  |  11  ----------------------------<  124<H>  3.5   |  29  |  0.78    Ca    8.3<L>      08 Dec 2022 03:30  Phos  4.1     12-08  Mg     2.00     12-08    TPro  5.7<L>  /  Alb  2.8<L>  /  TBili  0.6  /  DBili  x   /  AST  72<H>  /  ALT  64<H>  /  AlkPhos  76  12-08        < from: CT Head No Cont (12.02.22 @ 20:27) >    ACC: [id number] EXAM:  CT CERVICAL SPINE                        ACC: [id number] EXAM:  CT MAXILLOFACIAL                        ACC: [id number] EXAM:  CT BRAIN                          PROCEDURE DATE:  12/02/2022        INTERPRETATION:  CLINICAL INDICATION: Trauma.    Technique: Noncontrast axial CT of the head, facial bones, and cervical   spine was performed. 3-D reformats of the facial bones was obtained.   Coronal and sagittal reformats were obtained.      COMPARISON: None.    FINDINGS:  Head CT:  The ventricles and sulci are within normal limits. 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 There is no intraparenchymal hematoma, mass effect   or midline shift. No abnormal extra-axial fluid collections or   hemorrhages are present.    There is swelling of the left lateral scalp. The calvarium is intact.   There are scattered mucosal inflammatory changes in the paranasal sinuses.      Cervical spine CT:  Alignment ismaintained. Vertebral bodies are normal in height, without   evidence of fracture or dislocation. Prevertebral soft tissues are within   normal limits without soft tissue swelling or hematoma.    Intervertebral discs are intact. Neural foramina and spinal canal are   intact.    The visualized lung apices are within normal limits.      Facial bone CT:    No acute facial fracture.    There are scattered mucosal inflammatory changes in the paranasal   sinuses. Mastoid air cells are clear.    Soft tissues appear unremarkable.        IMPRESSION:  CT HEAD: No acute abnormality.  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  CT CERVICAL SPINE: No acute abnormality  CT FACIAL BONE: No acute abnormality    --- End of Report ---       DELGADO IVAN MD; Attending Radiologist  This document has been electronically signed. Dec  2 2022  9:02PM    < end of copied text >  < from: CT Lumbar Spine No Cont (12.02.22 @ 20:27) >    ACC: [id number] EXAM:  CT LUMBAR SPINE                        ACC: [id number] EXAM:  CT THORACIC SPINE                          PROCEDURE DATE:  12/02/2022          INTERPRETATION:  CT thoracic and lumbar spine without IV contrast    CLINICAL INFORMATION:  Trauma  Back pain, fracture.    TECHNIQUE:  Contiguous axial 2 mm sections were obtained through the   thoracic and lumbar spine using a single helical acquisition.     Additional 2 mm sagittal and coronal reconstructions of the spine were   obtained. These additional reformatted images were used to evaluate the   spine for alignment, vertebral fractures and the integrity of the the   posterior elements.   This scan was performed using automatic exposure   control (radiation dose reduction software) to obtain a diagnostic image   quality scan with patient dose as low as reasonably achievable.    FINDINGS:   No prior similar studies are available for review    Thoracic and lumbar vertebral body heights are maintained. No vertebral   fracture is seen. No destructive bone lesion is found.  Alignment is   preserved.  Facet joints appear intact and aligned.    Thoracic and lumbar intervertebral disc spaces appear intact.   Degenerative disc disease and spondylosis is noted at T6-T7 throughL4-5   with loss of disc height and associated degenerative endplate changes.   Mild disc bulges at L2-3 through L4-5 flatten the ventral thecal sac and   narrow the BILATERAL neural foramina.    No paraspinal mass is recognized.  Paraspinal soft tissues appear intact.      IMPRESSION:  Degenerative disc disease and spondylosis is noted at T6-T7   through L4-5 with loss of disc height and associated degenerative   endplate changes. Mild disc bulges at L2-3 through L4-5 flatten the   ventral thecal sac and narrow the BILATERAL neural foramina   No   vertebral fracture is recognized.    --- End of Report ---       ANGIE ESCOBAR MD; Attending Radiologist  This document has been electronically signed. Dec  2 2022  8:55PM    < end of copied text >    EEG Classification / Summary:  Abnormal EEG in a comatose patient due to diffuse suppression gradually improving to discontinuous background, with right hemispheric relative attenuation/suppression. No epileptiform abnormalities or seizures are captured.    Clinical Impression:  Severe diffuse cerebral dysfunction that gradually improves is nonspecific in etiology. In this case, it may be related to sedating medication (propofol) with improvement after decreasing and stopping the medication.  Right hemispheric focal cerebral dysfunction can be structural or functional in etiology.      12/7  Clinical Impression:  -Occasional runs of right frontal lateralized periodic discharges (LPDs) at up to 1.3 Hz indicate a potentially epileptogenic focus in the right frontal region and are on the ictal-interictal continuum.  -A pattern on the ictal-interictal continuum is a pattern that does not qualify as an electrographic seizure or electrographic status epilepticus, but there is a reasonable chance that it may be contributing to impaired alertness, causing other clinical symptoms, and/or contributing to neuronal injury.  -Right hemispheric relative attenuation indicates right hemispheric focal cerebral dysfunction, which can be structural or functional in etiology.  -Rare left frontal epileptiform discharges indicate a potentially epileptogenic focus in this reigon  -Severe diffuse slowing and GRDA indicate severe diffuse cerebral dysfunction of nonspecific etiology.  -No electrographic seizures are captured.

## 2022-12-09 NOTE — PROGRESS NOTE ADULT - ASSESSMENT
52-year-old  M known to me from outpatient setting with  TIAs (2011, 2013), Strokes x3 (02/2022 s/p tPA, 8/3/2022 s/p tPA, 11/5/2022 with residual expressive aphasia) on Eliquis, was on testosterone outpatient stopped after last stroke, HLD BIBEMS after being found unresponsive  Temp 105.4 on arrival tachy and /110. 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   CT cervical spine and maxillofacial scans negative.  CT chest, abdomen, and pelvis w/ contrast concerning for possible partial SBO without transition point. Surgery consulted in ED, no acute surgical intervention at this time.   CT thoracic and lumbar spine showing degenerative disc disease T6-T7 through L4-L5 and mild disc bulges at L2-L3 through L4-L5 that flatten the ventral thecal sac and narrowing of the bilateral neural foramina.  MRI 11/2022: R centrum semiovale and R posterior frontal infarcts   takes adderall at home   + rhabdo  EEG no seizures   + transaminitis   CTH 12/5 stable   outpatient hypercoag workup neg   12/5 extubated then reintibuated for seizure activity and tx back to ICU   EEG this AM 12/5 with only slowing   EEG 12/6 slowing but no seizures   spoke with Winter Conti (friend) who states after he was taken off the testosterone he ordered a mail order testosterone supplement, unclear if he started it yet, unclear what this was  12/7 EEG no electrogtraphic seizures captured but R LPDs, rare L frontal discharges, severe GRDA.   12/8 EEG improved.  extubated. following some commands   12/9 moving uppers> lowers     Impression:   1) AMS of unclear etiology, possible now seizure, related to adderral withdrawal? 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 had MIMI was question of PFO but not found. s/p ILR     - started  Vimpat 100mg BID    - keppra 1500mg BID.   - MRI brain seizure protocol   - fever on arrival, treatred initially for meningitis.  now on abx for aspiration PNA.  would consider LP at this point   - monitor LFTs , downtrending   - IVF  - CPK elevated. trend   - would consider endocrine   - there was some concern outpatient he may have a mixed  connective tissue disorder   - statin therapy for secondary stroke prevention when LFTS and CPK improve   - on heparin drip    - telemetry  - PT/OT/SS/SLP, OOBC  - check FS, glucose control <180  - GI/DVT ppx  - Thank you for allowing me to participate in the care of this patient. Call with questions.   - spoke with Winter Conti at [phone] for more collateral info and to provide update.   Braxton Forde MD  Vascular Neurology  Office: [phone]

## 2022-12-09 NOTE — PROGRESS NOTE ADULT - ASSESSMENT
52-year-old male with a PMHx significant for TIAs (2011, 2013), CVAs x3 (02/2022 s/p tPA, 8/3/2022 s/p tPA, 11/5/2022 with residual expressive aphasia) on Eliquis, and HLD BIBEMS after being found UTAnresponsive at home.

## 2022-12-09 NOTE — PROGRESS NOTE ADULT - SUBJECTIVE AND OBJECTIVE BOX
Name of Patient : TAMI DUNHAM  MRN: [id number]  Date of visit: 12-09-22       Subjective: Patient seen and examined. No new events except as noted.   awake, calm    REVIEW OF SYSTEMS:  limited     MEDICATIONS:  MEDICATIONS  (STANDING):  aspirin  chewable 81 milliGRAM(s) Oral daily  bisacodyl Suppository 10 milliGRAM(s) Rectal daily  chlorhexidine 2% Cloths 1 Application(s) Topical daily  dextrose 5% + sodium chloride 0.9%. 1000 milliLiter(s) (50 mL/Hr) IV Continuous <Continuous>  dextrose 50% Injectable 25 Gram(s) IV Push once  dextrose 50% Injectable 12.5 Gram(s) IV Push once  dextrose 50% Injectable 25 Gram(s) IV Push once  dextrose Oral Gel 15 Gram(s) Oral once  diphtheria/tetanus/pertussis (acellular) Vaccine (Adacel) 0.5 milliLiter(s) IntraMuscular once  glucagon  Injectable 1 milliGRAM(s) IntraMuscular once  heparin  Infusion. 1300 Unit(s)/Hr (13 mL/Hr) IV Continuous <Continuous>  lacosamide IVPB 100 milliGRAM(s) IV Intermittent every 12 hours  levETIRAcetam  IVPB 1500 milliGRAM(s) IV Intermittent every 12 hours  piperacillin/tazobactam IVPB.. 3.375 Gram(s) IV Intermittent every 8 hours  polyethylene glycol 3350 17 Gram(s) Oral daily  potassium chloride   Powder 40 milliEquivalent(s) Oral once      PHYSICAL EXAM:  T(C): 37.6 (12-09-22 @ 11:54), Max: 37.6 (12-09-22 @ 11:54)  HR: 86 (12-09-22 @ 11:54) (74 - 86)  BP: 156/88 (12-09-22 @ 11:54) (134/95 - 165/99)  RR: 18 (12-09-22 @ 11:54) (18 - 23)  SpO2: 96% (12-09-22 @ 11:54) (96% - 99%)  Wt(kg): --  I&O's Summary    08 Dec 2022 07:01  -  09 Dec 2022 07:00  --------------------------------------------------------  IN: 304 mL / OUT: 2200 mL / NET: -1896 mL          Appearance: awake, calm, not following commands   HEENT:  PERRLA   Lymphatic: No lymphadenopathy   Cardiovascular: Normal S1 S2, no JVD  Respiratory: normal effort , clear  Gastrointestinal:  Soft, Non-tender  Skin: No rashes,  warm to touch  Psychiatry:  Mood & affect appropriate  Musculuskeletal: No edema      All labs, Imaging and EKGs personally reviewed     12-08-22 @ 07:01  -  12-09-22 @ 07:00  --------------------------------------------------------  IN: 304 mL / OUT: 2200 mL / NET: -1896 mL                          9.4    14.27 )-----------( 220      ( 09 Dec 2022 07:00 )             28.1               12-09    137  |  100  |  12  ----------------------------<  211<H>  3.7   |  24  |  0.71    Ca    8.5      09 Dec 2022 07:00  Phos  3.6     12-09  Mg     1.90     12-09    TPro  6.2  /  Alb  3.1<L>  /  TBili  0.9  /  DBili  x   /  AST  67<H>  /  ALT  75<H>  /  AlkPhos  86  12-09    PTT - ( 09 Dec 2022 15:00 )  PTT:64.4 sec       CARDIAC MARKERS ( 08 Dec 2022 03:30 )  x     / x     / 1461 U/L / x     / x

## 2022-12-09 NOTE — SWALLOW BEDSIDE ASSESSMENT ADULT - PHARYNGEAL PHASE
Delayed pharyngeal swallow/Delayed cough post oral intake/Multiple swallows Delayed pharyngeal swallow/Cough post oral intake/Delayed cough post oral intake/Multiple swallows

## 2022-12-09 NOTE — PROGRESS NOTE ADULT - PROBLEM SELECTOR PLAN 1
found unresponsive at home   CTH with no acute findings  initially vEEG with no identified seizures  intubated 12/2, extubated 12/4  pending MRI H  check TTE

## 2022-12-09 NOTE — CONSULT NOTE ADULT - ASSESSMENT
52-year-old male with a PMHx significant for TIAs (2011, 2013), CVAs x3 (02/2022 s/p tPA, 8/3/2022 s/p tPA, 11/5/2022 with residual expressive aphasia) on Eliquis, and HLD BIBEMS after being found unresponsive at home with possible aspirtion pna, s/p micu stay, leukocytosis     Wong Ramírez  Attending Physician   Division of Infectious Disease  Office #[phone]  Available on Microsoft Teams also  After 5pm/weekend or no response, call #[phone]

## 2022-12-09 NOTE — SWALLOW BEDSIDE ASSESSMENT ADULT - ADDITIONAL RECOMMENDATIONS
1.) This service to follow up as schedule permits to determine candidacy for PO intake and objective testing. 2. Medical team advised to reconsult as patient becomes medically optimized. 1.) This service to follow up as schedule permits to determine candidacy for PO intake and objective testing. 2.) Medical team advised to reconsult as patient becomes medically optimized. 3.) RD consult to ensure adequate caloric/hydration intake. 4.) ENT consult due to noted wound on right lingual surface.

## 2022-12-09 NOTE — PROGRESS NOTE ADULT - ASSESSMENT
52-year-old male with a PMHx significant for TIAs (2011, 2013), CVAs x3 (02/2022 s/p tPA, 8/3/2022 s/p tPA, 11/5/2022 with residual expressive aphasia) on Eliquis, and HLD BIBEMS after being found unresponsive at home on the floor, last known well 12/1 at around noon. C-collar was placed. Patient was intubated in the ED for airway protection. CTH with no acute findings, vEEG with no identified seizures. Patient was weaned off pressors, extubated, and transitioned to floors 12/4/22. 12/5 am RRT called for seizure like activity with urinary incontinence, tongue biting, and R>L posturing, patient s/p ativan 2mg x3. Anesthesia called to RRT for intubation. MICU accepting patient for seizure like activity requiring intubation.      #AMS, Seizure like activity  EEG 12/7: concern for epileptiform activity; though EEG from 12/5 without such abnormalities.   - f/u neuro recs  - Patient found down and unresponsive at home on 12/2, last known well 12/1 at around noon. Intubated in ED 12/2, extubated in MICU 12/4  - RRT 12/5: seizure like acting with urinary incontinence, convulsions, and tongue biting; s/p ativan 2mg x3 with pauses in seizure activity following each; intubated for airway protection with rocuronium. Prolactin ~75   - CTH and CT cervical spine 12/2 negative for any acute pathologies. Tox screen on admission negative. 12/5: Stable exam from prior CT head, chronic microvascular ischemic changes, parenchymal loss, dystrophic calcification of central portion of alexis unchanged.  - Video EEG 12/3-12/4 without any seizure like activity, moderate to severe nonspecific diffuse or multifocal cerebral dysfunction  - propofol off since 3am 12/6  - c/w keppra 1500mg q12 maintenance, Keppra loaded 4.5g  - patient extubated again on 12/8th  - Pending MR brain for stroke evaluation, patient has loop recorder & MRI compatible per Radiology     #CVAs/TIAs  - Hx of TIAs in 2011 and 2013, CVAs x3 in 02/22, 8/22, and 11/22 with residual expressive aphasia   - on Eliquis and Aspirin at home for hx of stroke  - PFO work up in past negative, no evidence of PFO on MIMI X2  - c/w ASA   - c/w heparin gtt  - Restart Statin when LFTs and CPK improve per neurology  - Being worked up for Mixed Connective Tissue Disease OP- f/u p-ANCA, c-ANCA/ [id number] negative  - Neuro recs appreciated    #Thecal sac flattening   - CT thoracic and lumbar spine showing degenerative disc disease T6-T7 through L4-L5 and mild disc bulges at L2-L3 through L4-L5 that flatten the ventral thecal sac and narrowing of the bilateral neural foramina  - Will Monitor for now, will consider neuro/neurosurg evaluation in the future    #HLD  - atorvastatin on hold due to elevated CK and LFTs  - restart when possible for secondary stroke prevention    #?ASD/PFO  - Patient reportedly found to have a PFO in February 2022 during a stroke workup at [place]. Patient presented for a PFO closure in November 2022. Notably, no PFO was found at the time and no intervention was performed by Dr. Lynn.    - TTE 11/5/22 EF 57% and grossly normal LV function  - MIMI performed bedside 12/5 1 of 2 studies (+) on bubble study (uploaded to [employer])  - Contacted Dr. Cesar who saw patient for PFO back in 11/2022, f/u recs      #Rhabdomyolysis  - CK 8720 on admission, peaked at 15k, now downtrending  - DDx: prolonged downtime myositis v hyperthermia? (T 105.4 F) v sepsis v seizure induced  - stopped IVF 12/7 since CK ~2k  - Continue checking CK daily      #Partial SBO - resolved  - CT chest, abdomen, and pelvis w/ contrast concerning for possible partial SBO without transition point.  - Surgery consulted, no acute intervention at this time  - 12/5 KUB - negative for ileus or SBO  - Hold TF for 12/7 for possible trial of extubation.  - otherwise diet per nutrition      #Leukocytosis  - Concern for septic process  - Trend CBCs    #Concern for sepsis  Unclear source, aspiration v less likely meningitis   BCx (12/2 NGTD repeat 12/5 NGTD) and UC 12/2 NGTD  - Patient initially presented with AMS, T 105.4, tachycardic, and tachypneic   - UA negative  - s/p vanco and zosyn x1 in ED 12/2, ceftriaxone 2g BID 12/3-12/4, vanco 12/3-12/4  - 12/7 DCed Vanc since BCx from 12/5 NGTD  - c/w zosyn for aspiration pna presumed  - ID eval called   - Complete and monitor for recurrent fever, if febrile, may need LP

## 2022-12-09 NOTE — SWALLOW BEDSIDE ASSESSMENT ADULT - SWALLOW EVAL: RECOMMENDED DIET
NPO with consideration of short-term non-oral means of nutrition/hydration. Oral nutrition and hydration is contraindicated at this time. Consider short-term alternate means of nutrition/hydration as patient is at an increased nutritional risk.

## 2022-12-10 LAB
ALBUMIN SERPL ELPH-MCNC: 3.2 G/DL — LOW (ref 3.3–5)
ALP SERPL-CCNC: 99 U/L — SIGNIFICANT CHANGE UP (ref 40–120)
ALT FLD-CCNC: 63 U/L — HIGH (ref 4–41)
ANION GAP SERPL CALC-SCNC: 12 MMOL/L — SIGNIFICANT CHANGE UP (ref 7–14)
APTT BLD: 48.1 SEC — HIGH (ref 27–36.3)
APTT BLD: 53.4 SEC — HIGH (ref 27–36.3)
APTT BLD: 73.2 SEC — HIGH (ref 27–36.3)
AST SERPL-CCNC: 41 U/L — HIGH (ref 4–40)
BILIRUB SERPL-MCNC: 1 MG/DL — SIGNIFICANT CHANGE UP (ref 0.2–1.2)
BUN SERPL-MCNC: 19 MG/DL — SIGNIFICANT CHANGE UP (ref 7–23)
CALCIUM SERPL-MCNC: 8.8 MG/DL — SIGNIFICANT CHANGE UP (ref 8.4–10.5)
CHLORIDE SERPL-SCNC: 106 MMOL/L — SIGNIFICANT CHANGE UP (ref 98–107)
CK SERPL-CCNC: 616 U/L — HIGH (ref 30–200)
CO2 SERPL-SCNC: 24 MMOL/L — SIGNIFICANT CHANGE UP (ref 22–31)
CREAT SERPL-MCNC: 0.81 MG/DL — SIGNIFICANT CHANGE UP (ref 0.5–1.3)
CULTURE RESULTS: SIGNIFICANT CHANGE UP
EGFR: 106 ML/MIN/1.73M2 — SIGNIFICANT CHANGE UP
GLUCOSE BLDC GLUCOMTR-MCNC: 113 MG/DL — HIGH (ref 70–99)
GLUCOSE BLDC GLUCOMTR-MCNC: 117 MG/DL — HIGH (ref 70–99)
GLUCOSE BLDC GLUCOMTR-MCNC: 120 MG/DL — HIGH (ref 70–99)
GLUCOSE BLDC GLUCOMTR-MCNC: 125 MG/DL — HIGH (ref 70–99)
GLUCOSE SERPL-MCNC: 119 MG/DL — HIGH (ref 70–99)
HCT VFR BLD CALC: 27.9 % — LOW (ref 39–50)
HGB BLD-MCNC: 9.3 G/DL — LOW (ref 13–17)
MAGNESIUM SERPL-MCNC: 1.9 MG/DL — SIGNIFICANT CHANGE UP (ref 1.6–2.6)
MCHC RBC-ENTMCNC: 29.6 PG — SIGNIFICANT CHANGE UP (ref 27–34)
MCHC RBC-ENTMCNC: 33.3 GM/DL — SIGNIFICANT CHANGE UP (ref 32–36)
MCV RBC AUTO: 88.9 FL — SIGNIFICANT CHANGE UP (ref 80–100)
NRBC # BLD: 0 /100 WBCS — SIGNIFICANT CHANGE UP (ref 0–0)
NRBC # FLD: 0.03 K/UL — HIGH (ref 0–0)
PHOSPHATE SERPL-MCNC: 3.3 MG/DL — SIGNIFICANT CHANGE UP (ref 2.5–4.5)
PLATELET # BLD AUTO: 253 K/UL — SIGNIFICANT CHANGE UP (ref 150–400)
POTASSIUM SERPL-MCNC: 3.5 MMOL/L — SIGNIFICANT CHANGE UP (ref 3.5–5.3)
POTASSIUM SERPL-SCNC: 3.5 MMOL/L — SIGNIFICANT CHANGE UP (ref 3.5–5.3)
PROT SERPL-MCNC: 6.5 G/DL — SIGNIFICANT CHANGE UP (ref 6–8.3)
RBC # BLD: 3.14 M/UL — LOW (ref 4.2–5.8)
RBC # FLD: 14.4 % — SIGNIFICANT CHANGE UP (ref 10.3–14.5)
SODIUM SERPL-SCNC: 142 MMOL/L — SIGNIFICANT CHANGE UP (ref 135–145)
SPECIMEN SOURCE: SIGNIFICANT CHANGE UP
WBC # BLD: 15.6 K/UL — HIGH (ref 3.8–10.5)
WBC # FLD AUTO: 15.6 K/UL — HIGH (ref 3.8–10.5)

## 2022-12-10 RX ORDER — DIPHENHYDRAMINE HCL 50 MG
12.5 CAPSULE ORAL ONCE
Refills: 0 | Status: COMPLETED | OUTPATIENT
Start: 2022-12-10 | End: 2022-12-13

## 2022-12-10 RX ORDER — POTASSIUM CHLORIDE 20 MEQ
10 PACKET (EA) ORAL
Refills: 0 | Status: COMPLETED | OUTPATIENT
Start: 2022-12-10 | End: 2022-12-10

## 2022-12-10 RX ADMIN — HEPARIN SODIUM 2100 UNIT(S)/HR: 5000 INJECTION INTRAVENOUS; SUBCUTANEOUS at 14:15

## 2022-12-10 RX ADMIN — HEPARIN SODIUM 4000 UNIT(S): 5000 INJECTION INTRAVENOUS; SUBCUTANEOUS at 08:16

## 2022-12-10 RX ADMIN — HEPARIN SODIUM 2100 UNIT(S)/HR: 5000 INJECTION INTRAVENOUS; SUBCUTANEOUS at 08:10

## 2022-12-10 RX ADMIN — Medication 100 MILLIEQUIVALENT(S): at 21:31

## 2022-12-10 RX ADMIN — HEPARIN SODIUM 1900 UNIT(S)/HR: 5000 INJECTION INTRAVENOUS; SUBCUTANEOUS at 08:05

## 2022-12-10 RX ADMIN — HEPARIN SODIUM 2300 UNIT(S)/HR: 5000 INJECTION INTRAVENOUS; SUBCUTANEOUS at 15:48

## 2022-12-10 RX ADMIN — HEPARIN SODIUM 1900 UNIT(S)/HR: 5000 INJECTION INTRAVENOUS; SUBCUTANEOUS at 00:57

## 2022-12-10 RX ADMIN — Medication 100 MILLIEQUIVALENT(S): at 23:17

## 2022-12-10 RX ADMIN — CHLORHEXIDINE GLUCONATE 1 APPLICATION(S): 213 SOLUTION TOPICAL at 17:39

## 2022-12-10 RX ADMIN — HEPARIN SODIUM 2300 UNIT(S)/HR: 5000 INJECTION INTRAVENOUS; SUBCUTANEOUS at 19:48

## 2022-12-10 RX ADMIN — HEPARIN SODIUM 4000 UNIT(S): 5000 INJECTION INTRAVENOUS; SUBCUTANEOUS at 15:53

## 2022-12-10 RX ADMIN — HEPARIN SODIUM 2300 UNIT(S)/HR: 5000 INJECTION INTRAVENOUS; SUBCUTANEOUS at 23:17

## 2022-12-10 RX ADMIN — LEVETIRACETAM 400 MILLIGRAM(S): 250 TABLET, FILM COATED ORAL at 20:53

## 2022-12-10 RX ADMIN — Medication 100 MILLIEQUIVALENT(S): at 17:39

## 2022-12-10 RX ADMIN — LACOSAMIDE 120 MILLIGRAM(S): 50 TABLET ORAL at 09:48

## 2022-12-10 RX ADMIN — LEVETIRACETAM 400 MILLIGRAM(S): 250 TABLET, FILM COATED ORAL at 06:56

## 2022-12-10 RX ADMIN — LACOSAMIDE 120 MILLIGRAM(S): 50 TABLET ORAL at 20:07

## 2022-12-10 NOTE — PROGRESS NOTE ADULT - SUBJECTIVE AND OBJECTIVE BOX
Subjective: Patient seen and examined. No new events except as noted.     REVIEW OF SYSTEMS:    CONSTITUTIONAL: + weakness, fevers or chills  EYES/ENT: No visual changes;  No vertigo or throat pain   NECK: No pain or stiffness  RESPIRATORY: No cough, wheezing, hemoptysis; No shortness of breath  CARDIOVASCULAR: No chest pain or palpitations  GASTROINTESTINAL: No abdominal or epigastric pain. No nausea, vomiting, or hematemesis; No diarrhea or constipation. No melena or hematochezia.  GENITOURINARY: No dysuria, frequency or hematuria  NEUROLOGICAL: No numbness or weakness  SKIN: No itching, burning, rashes, or lesions   All other review of systems is negative unless indicated above.    MEDICATIONS:  MEDICATIONS  (STANDING):  aspirin  chewable 81 milliGRAM(s) Oral daily  bisacodyl Suppository 10 milliGRAM(s) Rectal daily  chlorhexidine 2% Cloths 1 Application(s) Topical daily  dextrose 5% + sodium chloride 0.9%. 1000 milliLiter(s) (50 mL/Hr) IV Continuous <Continuous>  diphtheria/tetanus/pertussis (acellular) Vaccine (Adacel) 0.5 milliLiter(s) IntraMuscular once  heparin  Infusion. 1300 Unit(s)/Hr (13 mL/Hr) IV Continuous <Continuous>  lacosamide IVPB 100 milliGRAM(s) IV Intermittent every 12 hours  levETIRAcetam  IVPB 1500 milliGRAM(s) IV Intermittent every 12 hours  piperacillin/tazobactam IVPB.. 3.375 Gram(s) IV Intermittent every 8 hours  polyethylene glycol 3350 17 Gram(s) Oral daily  potassium chloride   Powder 40 milliEquivalent(s) Oral once    PHYSICAL EXAM:  Vital Signs Last 24 Hrs  T(C): 36.6 (10 Dec 2022 05:02), Max: 36.6 (09 Dec 2022 21:28)  T(F): 97.9 (10 Dec 2022 05:02), Max: 97.9 (09 Dec 2022 21:28)  HR: 105 (10 Dec 2022 05:02) (92 - 105)  BP: 145/86 (10 Dec 2022 05:02) (142/94 - 145/86)  BP(mean): --  RR: 18 (10 Dec 2022 05:02) (18 - 18)  SpO2: 95% (10 Dec 2022 05:02) (95% - 95%)    Parameters below as of 10 Dec 2022 05:02  Patient On (Oxygen Delivery Method): room air    I&O's Summary    09 Dec 2022 07:01  -  10 Dec 2022 07:00  --------------------------------------------------------  IN: 759 mL / OUT: 0 mL / NET: 759 mL    Appearance: NAD  HEENT: dry oral mucosa, PERRL, EOMI - L temporal abrasion	  Lymphatic: No lymphadenopathy , no edema  Cardiovascular: Normal S1 S2, No JVD, No murmurs , Peripheral pulses palpable 2+ bilaterally  Respiratory: Coarse BS	  Gastrointestinal:  Soft, Non-tender, + BS	  Skin: No rashes, + ecchymoses, No cyanosis, warm to touch - BL mittes  Neurological Exam:    Mental Status: Awake, Oriented x 1-2, followign some simple. minimal verbal saying few words,   Cranial Nerves: PERRL, EOMI, VFFC, sensation V1-V3 intact,  no obvious facial asymmetry    Motor: Moving uppers > lowers. uppers at least 3-4/5 in mittens   Sensation:minimal withdrawal to noxious x 4  R>L   Reflexes: 1+ throughout at biceps, brachioradialis, triceps, patellars and ankles bilaterally and equal. No clonus. R toe and L toe were both downgoing.  Coordination: unable   Gait: unable   Ext: No edema    LABS:    CARDIAC MARKERS:                        9.3    15.60 )-----------( 253      ( 10 Dec 2022 06:51 )             27.9     12-10    142  |  106  |  19  ----------------------------<  119<H>  3.5   |  24  |  0.81    Ca    8.8      10 Dec 2022 06:51  Phos  3.3     12-10  Mg     1.90     12-10    TPro  6.5  /  Alb  3.2<L>  /  TBili  1.0  /  DBili  x   /  AST  41<H>  /  ALT  63<H>  /  AlkPhos  99  12-10    proBNP:   Lipid Profile:   HgA1c:   TSH:     TELEMETRY: 	    ECG:  	  RADIOLOGY:   DIAGNOSTIC TESTING:  [ ] Echocardiogram:  [ ]  Catheterization:  [ ] Stress Test:    OTHER:

## 2022-12-10 NOTE — PROGRESS NOTE ADULT - PROBLEM SELECTOR PLAN 1
found unresponsive at home   CTH with no acute findings  initially vEEG with no identified seizures  intubated 12/2, extubated 12/4  MRI H - right frontal subcortical and right centrum semiovale infarcts unchanged  check TTE

## 2022-12-10 NOTE — PROGRESS NOTE ADULT - ASSESSMENT
52-year-old male with a PMHx significant for TIAs (2011, 2013), CVAs x3 (02/2022 s/p tPA, 8/3/2022 s/p tPA, 11/5/2022 with residual expressive aphasia) on Eliquis, and HLD BIBEMS after being found unresponsive at home on the floor, last known well 12/1 at around noon. C-collar was placed. Patient was intubated in the ED for airway protection. CTH with no acute findings, vEEG with no identified seizures. Patient was weaned off pressors, extubated, and transitioned to floors 12/4/22. 12/5 am RRT called for seizure like activity with urinary incontinence, tongue biting, and R>L posturing, patient s/p ativan 2mg x3. Anesthesia called to RRT for intubation. MICU accepting patient for seizure like activity requiring intubation.      #AMS, Seizure like activity  EEG 12/7: concern for epileptiform activity; though EEG from 12/5 without such abnormalities.   - f/u neuro recs  - Patient found down and unresponsive at home on 12/2, last known well 12/1 at around noon. Intubated in ED 12/2, extubated in MICU 12/4  - RRT 12/5: seizure like acting with urinary incontinence, convulsions, and tongue biting; s/p ativan 2mg x3 with pauses in seizure activity following each; intubated for airway protection with rocuronium. Prolactin ~75   - CTH and CT cervical spine 12/2 negative for any acute pathologies. Tox screen on admission negative. 12/5: Stable exam from prior CT head, chronic microvascular ischemic changes, parenchymal loss, dystrophic calcification of central portion of alexis unchanged.  - Video EEG 12/3-12/4 without any seizure like activity, moderate to severe nonspecific diffuse or multifocal cerebral dysfunction  - propofol off since 3am 12/6  - c/w keppra 1500mg q12 maintenance, Keppra loaded 4.5g  - patient extubated again on 12/8th  -MRI noted, chronci CVA, no acute CVA noted     please call house Endocrine as per family, patient has been taking testosterone supplement prior to this episodes discussed with neuro     #CVAs/TIAs  - Hx of TIAs in 2011 and 2013, CVAs x3 in 02/22, 8/22, and 11/22 with residual expressive aphasia   - on Eliquis and Aspirin at home for hx of stroke  - PFO work up in past negative, no evidence of PFO on MIMI X2  - c/w ASA   - c/w heparin gtt  - Restart Statin when LFTs and CPK improve per neurology  - Being worked up for Mixed Connective Tissue Disease OP- f/u p-ANCA, c-ANCA/ [id number] negative  - Neuro recs appreciated    #Thecal sac flattening   - CT thoracic and lumbar spine showing degenerative disc disease T6-T7 through L4-L5 and mild disc bulges at L2-L3 through L4-L5 that flatten the ventral thecal sac and narrowing of the bilateral neural foramina  - Will Monitor for now, will consider neuro/neurosurg evaluation in the future    #HLD  - atorvastatin on hold due to elevated CK and LFTs  - restart when possible for secondary stroke prevention    #?ASD/PFO  - Patient reportedly found to have a PFO in February 2022 during a stroke workup at [facility]. Patient presented for a PFO closure in November 2022. Notably, no PFO was found at the time and no intervention was performed by Dr. Lynn.    - TTE 11/5/22 EF 57% and grossly normal LV function  - MIMI performed bedside 12/5 1 of 2 studies (+) on bubble study (uploaded to [facility])  - Contacted Dr. Cesar who saw patient for PFO back in 11/2022, f/u recs      #Rhabdomyolysis  - CK 8720 on admission, peaked at 15k, now downtrending  - DDx: prolonged downtime myositis v hyperthermia? (T 105.4 F) v sepsis v seizure induced  - stopped IVF 12/7 since CK ~2k  - Continue checking CK daily      #Partial SBO - resolved  - CT chest, abdomen, and pelvis w/ contrast concerning for possible partial SBO without transition point.  - Surgery consulted, no acute intervention at this time  - 12/5 KUB - negative for ileus or SBO  - Hold TF for 12/7 for possible trial of extubation.  - otherwise diet per nutrition      #Leukocytosis  - Concern for septic process  - Trend CBCs    #Concern for sepsis  Unclear source, aspiration v less likely meningitis   BCx (12/2 NGTD repeat 12/5 NGTD) and UC 12/2 NGTD  - Patient initially presented with AMS, T 105.4, tachycardic, and tachypneic   - UA negative  - s/p vanco and zosyn x1 in ED 12/2, ceftriaxone 2g BID 12/3-12/4, vanco 12/3-12/4  - 12/7 DCed Vanc since BCx from 12/5 NGTD  - c/w zosyn for aspiration pna presumed. completed course, monitor   - ID eval called   - Complete and monitor for recurrent fever, if febrile, may need LP

## 2022-12-10 NOTE — PROGRESS NOTE ADULT - SUBJECTIVE AND OBJECTIVE BOX
Name of Patient : TAMI DUNHAM  MRN: [id number]  Date of visit: 12-10-22       Subjective: Patient seen and examined. No new events except as noted.   awake, agitated overngiht  one to one for safety       REVIEW OF SYSTEMS:  states no cpmplains     MEDICATIONS:  MEDICATIONS  (STANDING):  aspirin  chewable 81 milliGRAM(s) Oral daily  bisacodyl Suppository 10 milliGRAM(s) Rectal daily  chlorhexidine 2% Cloths 1 Application(s) Topical daily  dextrose 5% + sodium chloride 0.9%. 1000 milliLiter(s) (50 mL/Hr) IV Continuous <Continuous>  dextrose 50% Injectable 25 Gram(s) IV Push once  dextrose 50% Injectable 12.5 Gram(s) IV Push once  dextrose 50% Injectable 25 Gram(s) IV Push once  dextrose Oral Gel 15 Gram(s) Oral once  diphtheria/tetanus/pertussis (acellular) Vaccine (Adacel) 0.5 milliLiter(s) IntraMuscular once  glucagon  Injectable 1 milliGRAM(s) IntraMuscular once  heparin  Infusion. 1300 Unit(s)/Hr (13 mL/Hr) IV Continuous <Continuous>  lacosamide IVPB 100 milliGRAM(s) IV Intermittent every 12 hours  levETIRAcetam  IVPB 1500 milliGRAM(s) IV Intermittent every 12 hours  potassium chloride  10 mEq/100 mL IVPB 10 milliEquivalent(s) IV Intermittent every 1 hour      PHYSICAL EXAM:  T(C): 36.6 (12-10-22 @ 05:02), Max: 36.6 (12-09-22 @ 21:28)  HR: 105 (12-10-22 @ 05:02) (92 - 105)  BP: 145/86 (12-10-22 @ 05:02) (142/94 - 145/86)  RR: 18 (12-10-22 @ 05:02) (18 - 18)  SpO2: 95% (12-10-22 @ 05:02) (95% - 95%)  Wt(kg): --  I&O's Summary    09 Dec 2022 07:01  -  10 Dec 2022 07:00  --------------------------------------------------------  IN: 759 mL / OUT: 0 mL / NET: 759 mL          Appearance: awake, mild/mod agitation   HEENT:  PERRLA   Lymphatic: No lymphadenopathy   Cardiovascular: Normal S1 S2, no JVD  Respiratory: normal effort , clear  Gastrointestinal:  Soft, Non-tender  Skin: No rashes,  warm to touch  Psychiatry:  Mood & affect appropriate  Musculuskeletal: No edema      All labs, Imaging and EKGs personally reviewed     12-09-22 @ 07:01  -  12-10-22 @ 07:00  --------------------------------------------------------  IN: 759 mL / OUT: 0 mL / NET: 759 mL                            9.3    15.60 )-----------( 253      ( 10 Dec 2022 06:51 )             27.9               12-10    142  |  106  |  19  ----------------------------<  119<H>  3.5   |  24  |  0.81    Ca    8.8      10 Dec 2022 06:51  Phos  3.3     12-10  Mg     1.90     12-10    TPro  6.5  /  Alb  3.2<L>  /  TBili  1.0  /  DBili  x   /  AST  41<H>  /  ALT  63<H>  /  AlkPhos  99  12-10    PTT - ( 10 Dec 2022 13:59 )  PTT:48.1 sec       CARDIAC MARKERS ( 10 Dec 2022 06:51 )  x     / x     / 616 U/L / x     / x

## 2022-12-11 LAB
ALBUMIN SERPL ELPH-MCNC: 2.9 G/DL — LOW (ref 3.3–5)
ALP SERPL-CCNC: 96 U/L — SIGNIFICANT CHANGE UP (ref 40–120)
ALT FLD-CCNC: 49 U/L — HIGH (ref 4–41)
ANION GAP SERPL CALC-SCNC: 11 MMOL/L — SIGNIFICANT CHANGE UP (ref 7–14)
APTT BLD: 73.4 SEC — HIGH (ref 27–36.3)
AST SERPL-CCNC: 31 U/L — SIGNIFICANT CHANGE UP (ref 4–40)
BILIRUB SERPL-MCNC: 1.4 MG/DL — HIGH (ref 0.2–1.2)
BUN SERPL-MCNC: 21 MG/DL — SIGNIFICANT CHANGE UP (ref 7–23)
CALCIUM SERPL-MCNC: 8.4 MG/DL — SIGNIFICANT CHANGE UP (ref 8.4–10.5)
CHLORIDE SERPL-SCNC: 106 MMOL/L — SIGNIFICANT CHANGE UP (ref 98–107)
CK SERPL-CCNC: 427 U/L — HIGH (ref 30–200)
CO2 SERPL-SCNC: 22 MMOL/L — SIGNIFICANT CHANGE UP (ref 22–31)
CREAT SERPL-MCNC: 0.79 MG/DL — SIGNIFICANT CHANGE UP (ref 0.5–1.3)
EGFR: 107 ML/MIN/1.73M2 — SIGNIFICANT CHANGE UP
GLUCOSE BLDC GLUCOMTR-MCNC: 116 MG/DL — HIGH (ref 70–99)
GLUCOSE BLDC GLUCOMTR-MCNC: 130 MG/DL — HIGH (ref 70–99)
GLUCOSE BLDC GLUCOMTR-MCNC: 143 MG/DL — HIGH (ref 70–99)
GLUCOSE SERPL-MCNC: 129 MG/DL — HIGH (ref 70–99)
HCT VFR BLD CALC: 27 % — LOW (ref 39–50)
HGB BLD-MCNC: 9.1 G/DL — LOW (ref 13–17)
MAGNESIUM SERPL-MCNC: 2 MG/DL — SIGNIFICANT CHANGE UP (ref 1.6–2.6)
MCHC RBC-ENTMCNC: 30.1 PG — SIGNIFICANT CHANGE UP (ref 27–34)
MCHC RBC-ENTMCNC: 33.7 GM/DL — SIGNIFICANT CHANGE UP (ref 32–36)
MCV RBC AUTO: 89.4 FL — SIGNIFICANT CHANGE UP (ref 80–100)
NRBC # BLD: 0 /100 WBCS — SIGNIFICANT CHANGE UP (ref 0–0)
NRBC # FLD: 0.02 K/UL — HIGH (ref 0–0)
PHOSPHATE SERPL-MCNC: 3.3 MG/DL — SIGNIFICANT CHANGE UP (ref 2.5–4.5)
PLATELET # BLD AUTO: 269 K/UL — SIGNIFICANT CHANGE UP (ref 150–400)
POTASSIUM SERPL-MCNC: 3.6 MMOL/L — SIGNIFICANT CHANGE UP (ref 3.5–5.3)
POTASSIUM SERPL-SCNC: 3.6 MMOL/L — SIGNIFICANT CHANGE UP (ref 3.5–5.3)
PROT SERPL-MCNC: 6.3 G/DL — SIGNIFICANT CHANGE UP (ref 6–8.3)
RBC # BLD: 3.02 M/UL — LOW (ref 4.2–5.8)
RBC # FLD: 14.8 % — HIGH (ref 10.3–14.5)
SODIUM SERPL-SCNC: 139 MMOL/L — SIGNIFICANT CHANGE UP (ref 135–145)
WBC # BLD: 20.54 K/UL — HIGH (ref 3.8–10.5)
WBC # FLD AUTO: 20.54 K/UL — HIGH (ref 3.8–10.5)

## 2022-12-11 PROCEDURE — 99232 SBSQ HOSP IP/OBS MODERATE 35: CPT

## 2022-12-11 RX ORDER — POTASSIUM CHLORIDE 20 MEQ
10 PACKET (EA) ORAL
Refills: 0 | Status: COMPLETED | OUTPATIENT
Start: 2022-12-11 | End: 2022-12-11

## 2022-12-11 RX ADMIN — HEPARIN SODIUM 2300 UNIT(S)/HR: 5000 INJECTION INTRAVENOUS; SUBCUTANEOUS at 19:51

## 2022-12-11 RX ADMIN — LEVETIRACETAM 400 MILLIGRAM(S): 250 TABLET, FILM COATED ORAL at 05:06

## 2022-12-11 RX ADMIN — HEPARIN SODIUM 2300 UNIT(S)/HR: 5000 INJECTION INTRAVENOUS; SUBCUTANEOUS at 15:38

## 2022-12-11 RX ADMIN — LEVETIRACETAM 400 MILLIGRAM(S): 250 TABLET, FILM COATED ORAL at 17:27

## 2022-12-11 RX ADMIN — Medication 100 MILLIEQUIVALENT(S): at 20:33

## 2022-12-11 RX ADMIN — HEPARIN SODIUM 2300 UNIT(S)/HR: 5000 INJECTION INTRAVENOUS; SUBCUTANEOUS at 07:11

## 2022-12-11 RX ADMIN — Medication 100 MILLIEQUIVALENT(S): at 21:30

## 2022-12-11 RX ADMIN — LACOSAMIDE 120 MILLIGRAM(S): 50 TABLET ORAL at 05:25

## 2022-12-11 RX ADMIN — HEPARIN SODIUM 2300 UNIT(S)/HR: 5000 INJECTION INTRAVENOUS; SUBCUTANEOUS at 03:00

## 2022-12-11 RX ADMIN — SODIUM CHLORIDE 50 MILLILITER(S): 9 INJECTION, SOLUTION INTRAVENOUS at 23:48

## 2022-12-11 RX ADMIN — SODIUM CHLORIDE 50 MILLILITER(S): 9 INJECTION, SOLUTION INTRAVENOUS at 03:00

## 2022-12-11 RX ADMIN — LACOSAMIDE 120 MILLIGRAM(S): 50 TABLET ORAL at 17:26

## 2022-12-11 RX ADMIN — HEPARIN SODIUM 2300 UNIT(S)/HR: 5000 INJECTION INTRAVENOUS; SUBCUTANEOUS at 08:12

## 2022-12-11 RX ADMIN — Medication 100 MILLIEQUIVALENT(S): at 19:20

## 2022-12-11 RX ADMIN — CHLORHEXIDINE GLUCONATE 1 APPLICATION(S): 213 SOLUTION TOPICAL at 12:09

## 2022-12-11 NOTE — PROGRESS NOTE ADULT - SUBJECTIVE AND OBJECTIVE BOX
Follow Up:  encephalopathy, eucocytosis    Interval History/ROS:  afebrile  lethargic  PCA reports sat up earlier trying to get to bathroom    appreciate neurology eval    Allergies  No Known Allergies        ANTIMICROBIALS:  completed zosyn 12/10    OTHER MEDS:  aspirin  chewable 81 milliGRAM(s) Oral daily  bisacodyl Suppository 10 milliGRAM(s) Rectal daily  chlorhexidine 2% Cloths 1 Application(s) Topical daily  dextrose 5% + sodium chloride 0.9%. 1000 milliLiter(s) IV Continuous <Continuous>  dextrose 50% Injectable 25 Gram(s) IV Push once  dextrose 50% Injectable 12.5 Gram(s) IV Push once  dextrose 50% Injectable 25 Gram(s) IV Push once  dextrose Oral Gel 15 Gram(s) Oral once  diphenhydrAMINE Injectable 12.5 milliGRAM(s) IV Push once PRN  diphtheria/tetanus/pertussis (acellular) Vaccine (Adacel) 0.5 milliLiter(s) IntraMuscular once  glucagon  Injectable 1 milliGRAM(s) IntraMuscular once  heparin   Injectable 8000 Unit(s) IV Push every 6 hours PRN  heparin   Injectable 4000 Unit(s) IV Push every 6 hours PRN  heparin  Infusion. 1300 Unit(s)/Hr IV Continuous <Continuous>  lacosamide IVPB 100 milliGRAM(s) IV Intermittent every 12 hours  levETIRAcetam  IVPB 1500 milliGRAM(s) IV Intermittent every 12 hours      Vital Signs Last 24 Hrs  T(C): 37.2 (11 Dec 2022 04:38), Max: 37.4 (10 Dec 2022 22:00)  T(F): 98.9 (11 Dec 2022 04:38), Max: 99.4 (10 Dec 2022 22:00)  HR: 101 (11 Dec 2022 04:38) (101 - 102)  BP: 136/78 (11 Dec 2022 04:38) (136/78 - 140/94)  BP(mean): --  RR: 18 (11 Dec 2022 04:38) (18 - 18)  SpO2: 97% (11 Dec 2022 04:38) (96% - 97%)    Parameters below as of 11 Dec 2022 04:38  Patient On (Oxygen Delivery Method): room air        PHYSICAL EXAM:  Constitutional: Non toxic unresponsive  Oral cavity: dry  RS: no respiratory distress  CVS: tachy  Abdomen: Soft.   : no fish  Extremities: left knee swelling warm no erythema hands in mits  Skin: scattered ecchymosis   Vascular: peripheral iv                            9.1    20.54 )-----------( 269      ( 11 Dec 2022 06:40 )             27.0       12-11    139  |  106  |  21  ----------------------------<  129<H>  3.6   |  22  |  0.79    Ca    8.4      11 Dec 2022 06:40  Phos  3.3     12-11  Mg     2.00     12-11    TPro  6.3  /  Alb  2.9<L>  /  TBili  1.4<H>  /  DBili  x   /  AST  31  /  ALT  49<H>  /  AlkPhos  96  12-11          MICROBIOLOGY:  v  .Blood Blood-Venous  12-05-22   No Growth Final  --  --      .Blood Blood-Peripheral  12-02-22   No Growth Final  --  --      .Blood Blood-Peripheral  12-02-22   No Growth Final  --  --      Catheterized Catheterized  12-02-22   <10,000 CFU/mL Normal Urogenital Narda  --  --          Rapid RVP Result: NotDetec (12-07 @ 07:04)        RADIOLOGY:    rad< from: MR Head w/wo IV Cont (12.09.22 @ 19:54) >  IMPRESSION: Atrophy for the patient's age. Right frontal subcortical and   right centrum semiovale punctate infarcts are unchanged since 11/5/2022   and likely represent subacute infarcts. No abnormal enhancement. Focus of   calcification in the alexis with old hemosiderin. A few additional   scattered foci of hemosiderin deposition are unchanged.    --- End of Report ---      < end of copied text >  < from: Xray Chest 1 View- PORTABLE-Urgent (Xray Chest 1 View- PORTABLE-Urgent .) (12.05.22 @ 10:01) >  ACC: [id number] EXAM:  XR CHEST PORTABLE URGENT 1V                        ACC: [id number] EXAM:  XR CHEST PORTABLE URGENT 1V                          PROCEDURE DATE:  12/05/2022          INTERPRETATION:  Chest one view 12/5/2022 4:37 AM    HISTORY: Line placement    COMPARISON STUDY: 12/3/2022    Frontal expiratory view of the chest shows the heart to be normal in   size. Endotracheal tube reaches the lower trachea. Loop recorder is again   noted.    The lungs show minimal left atelectasis and there is no evidence of   pneumothorax nor pleural effusion.    Chest one view 12/5/2022 9:33 AM  Compared to the prior study, nasogastric tube has been advanced to the   stomach. Its sidehole is at or near the GE junction.    IMPRESSION:  Postintubation.NG tube as noted.      < end of copied text >

## 2022-12-11 NOTE — PROGRESS NOTE ADULT - SUBJECTIVE AND OBJECTIVE BOX
Neurology Progress Note    S: Patient seen and examined , now on floor     Medication:    MEDICATIONS  (STANDING):  aspirin  chewable 81 milliGRAM(s) Oral daily  bisacodyl Suppository 10 milliGRAM(s) Rectal daily  chlorhexidine 2% Cloths 1 Application(s) Topical daily  dextrose 5% + sodium chloride 0.9%. 1000 milliLiter(s) (50 mL/Hr) IV Continuous <Continuous>  dextrose 50% Injectable 25 Gram(s) IV Push once  dextrose 50% Injectable 12.5 Gram(s) IV Push once  dextrose 50% Injectable 25 Gram(s) IV Push once  dextrose Oral Gel 15 Gram(s) Oral once  diphtheria/tetanus/pertussis (acellular) Vaccine (Adacel) 0.5 milliLiter(s) IntraMuscular once  glucagon  Injectable 1 milliGRAM(s) IntraMuscular once  heparin  Infusion. 1300 Unit(s)/Hr (13 mL/Hr) IV Continuous <Continuous>  lacosamide IVPB 100 milliGRAM(s) IV Intermittent every 12 hours  levETIRAcetam  IVPB 1500 milliGRAM(s) IV Intermittent every 12 hours    MEDICATIONS  (PRN):  diphenhydrAMINE Injectable 12.5 milliGRAM(s) IV Push once PRN Insomnia  heparin   Injectable 4000 Unit(s) IV Push every 6 hours PRN For aPTT between 40 - 57  heparin   Injectable 8000 Unit(s) IV Push every 6 hours PRN For aPTT less than 40    Vitals:  Vital Signs Last 24 Hrs  T(C): 37.2 (11 Dec 2022 04:38), Max: 37.4 (10 Dec 2022 22:00)  T(F): 98.9 (11 Dec 2022 04:38), Max: 99.4 (10 Dec 2022 22:00)  HR: 101 (11 Dec 2022 04:38) (94 - 102)  BP: 136/78 (11 Dec 2022 04:38) (136/78 - 142/87)  BP(mean): --  RR: 18 (11 Dec 2022 04:38) (17 - 18)  SpO2: 97% (11 Dec 2022 04:38) (96% - 97%)    Parameters below as of 11 Dec 2022 04:38  Patient On (Oxygen Delivery Method): room air        General Exam:   General Appearance: Appropriately dressed and in no acute distress       Head: Normocephalic, atraumatic and no dysmorphic features  Ear, Nose, and Throat: Moist mucous membranes    CVS: S1S2+  Resp: No SOB, no wheeze or rhonchi  Abd: soft NTND  Extremities: No edema, no cyanosis  Skin: No bruises, no rashes     Neurological Exam:    Mental Status: Awake, Oriented x 1-2, followign some simple.  speaking more but slurred.    Cranial Nerves: PERRL, EOMI, VFFC, sensation V1-V3 intact,  no obvious facial asymmetry    Motor: Moving uppers > lowers. uppers at least 3-4/5 in mittens ; not moving LLE very well   Sensation:minimal withdrawal to noxious x 4  R>L   Reflexes: 1+ throughout at biceps, brachioradialis, triceps, patellars and ankles bilaterally and equal. No clonus. R toe and L toe were both downgoing.  Coordination: unable   Gait: unable     I personally reviewed the below data/images/labs:  CBC Full  -  ( 11 Dec 2022 06:40 )  WBC Count : 20.54 K/uL  RBC Count : 3.02 M/uL  Hemoglobin : 9.1 g/dL  Hematocrit : 27.0 %  Platelet Count - Automated : 269 K/uL  Mean Cell Volume : 89.4 fL  Mean Cell Hemoglobin : 30.1 pg  Mean Cell Hemoglobin Concentration : 33.7 gm/dL  Auto Neutrophil # : x  Auto Lymphocyte # : x  Auto Monocyte # : x  Auto Eosinophil # : x  Auto Basophil # : x  Auto Neutrophil % : x  Auto Lymphocyte % : x  Auto Monocyte % : x  Auto Eosinophil % : x  Auto Basophil % : x  12-11    139  |  106  |  21  ----------------------------<  129<H>  3.6   |  22  |  0.79    Ca    8.4      11 Dec 2022 06:40  Phos  3.3     12-11  Mg     2.00     12-11    TPro  6.3  /  Alb  2.9<L>  /  TBili  1.4<H>  /  DBili  x   /  AST  31  /  ALT  49<H>  /  AlkPhos  96  12-11      < from: CT Head No Cont (12.02.22 @ 20:27) >    ACC: [id number] EXAM:  CT CERVICAL SPINE                        ACC: [id number] EXAM:  CT MAXILLOFACIAL                        ACC: [id number] EXAM:  CT BRAIN                          PROCEDURE DATE:  12/02/2022        INTERPRETATION:  CLINICAL INDICATION: Trauma.    Technique: Noncontrast axial CT of the head, facial bones, and cervical   spine was performed. 3-D reformats of the facial bones was obtained.   Coronal and sagittal reformats were obtained.      COMPARISON: None.    FINDINGS:  Head CT:  The ventricles and sulci are within normal limits. 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 There is no intraparenchymal hematoma, mass effect   or midline shift. No abnormal extra-axial fluid collections or   hemorrhages are present.    There is swelling of the left lateral scalp. The calvarium is intact.   There are scattered mucosal inflammatory changes in the paranasal sinuses.      Cervical spine CT:  Alignment ismaintained. Vertebral bodies are normal in height, without   evidence of fracture or dislocation. Prevertebral soft tissues are within   normal limits without soft tissue swelling or hematoma.    Intervertebral discs are intact. Neural foramina and spinal canal are   intact.    The visualized lung apices are within normal limits.      Facial bone CT:    No acute facial fracture.    There are scattered mucosal inflammatory changes in the paranasal   sinuses. Mastoid air cells are clear.    Soft tissues appear unremarkable.        IMPRESSION:  CT HEAD: No acute abnormality.  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  CT CERVICAL SPINE: No acute abnormality  CT FACIAL BONE: No acute abnormality    --- End of Report ---       DELGADO IVAN MD; Attending Radiologist  This document has been electronically signed. Dec  2 2022  9:02PM    < end of copied text >  < from: CT Lumbar Spine No Cont (12.02.22 @ 20:27) >    ACC: [id number] EXAM:  CT LUMBAR SPINE                        ACC: [id number] EXAM:  CT THORACIC SPINE                          PROCEDURE DATE:  12/02/2022          INTERPRETATION:  CT thoracic and lumbar spine without IV contrast    CLINICAL INFORMATION:  Trauma  Back pain, fracture.    TECHNIQUE:  Contiguous axial 2 mm sections were obtained through the   thoracic and lumbar spine using a single helical acquisition.     Additional 2 mm sagittal and coronal reconstructions of the spine were   obtained. These additional reformatted images were used to evaluate the   spine for alignment, vertebral fractures and the integrity of the the   posterior elements.   This scan was performed using automatic exposure   control (radiation dose reduction software) to obtain a diagnostic image   quality scan with patient dose as low as reasonably achievable.    FINDINGS:   No prior similar studies are available for review    Thoracic and lumbar vertebral body heights are maintained. No vertebral   fracture is seen. No destructive bone lesion is found.  Alignment is   preserved.  Facet joints appear intact and aligned.    Thoracic and lumbar intervertebral disc spaces appear intact.   Degenerative disc disease and spondylosis is noted at T6-T7 throughL4-5   with loss of disc height and associated degenerative endplate changes.   Mild disc bulges at L2-3 through L4-5 flatten the ventral thecal sac and   narrow the BILATERAL neural foramina.    No paraspinal mass is recognized.  Paraspinal soft tissues appear intact.      IMPRESSION:  Degenerative disc disease and spondylosis is noted at T6-T7   through L4-5 with loss of disc height and associated degenerative   endplate changes. Mild disc bulges at L2-3 through L4-5 flatten the   ventral thecal sac and narrow the BILATERAL neural foramina   No   vertebral fracture is recognized.    --- End of Report ---       ANGIE ESCOBAR MD; Attending Radiologist  This document has been electronically signed. Dec  2 2022  8:55PM    < end of copied text >    EEG Classification / Summary:  Abnormal EEG in a comatose patient due to diffuse suppression gradually improving to discontinuous background, with right hemispheric relative attenuation/suppression. No epileptiform abnormalities or seizures are captured.    Clinical Impression:  Severe diffuse cerebral dysfunction that gradually improves is nonspecific in etiology. In this case, it may be related to sedating medication (propofol) with improvement after decreasing and stopping the medication.  Right hemispheric focal cerebral dysfunction can be structural or functional in etiology.      12/7  Clinical Impression:  -Occasional runs of right frontal lateralized periodic discharges (LPDs) at up to 1.3 Hz indicate a potentially epileptogenic focus in the right frontal region and are on the ictal-interictal continuum.  -A pattern on the ictal-interictal continuum is a pattern that does not qualify as an electrographic seizure or electrographic status epilepticus, but there is a reasonable chance that it may be contributing to impaired alertness, causing other clinical symptoms, and/or contributing to neuronal injury.  -Right hemispheric relative attenuation indicates right hemispheric focal cerebral dysfunction, which can be structural or functional in etiology.  -Rare left frontal epileptiform discharges indicate a potentially epileptogenic focus in this reigon  -Severe diffuse slowing and GRDA indicate severe diffuse cerebral dysfunction of nonspecific etiology.  -No electrographic seizures are captured.     < from: MR Head w/wo IV Cont (12.09.22 @ 19:54) >    ACC: [id number] EXAM:  MR BRAIN WAW IC                          PROCEDURE DATE:  12/09/2022          INTERPRETATION:  CLINICAL INDICATION: CVA, found unresponsive at home      Magnetic resonance imaging of the brain was carried out with transaxial   SPGR, FLAIR, fast spin echo T2 weighted images, axial susceptibility   weighted series, diffusion weighted series and sagittal T1 weighted   series on a 1.5 Maritza magnet. Post contrast axial, coronal and sagittal   T1 weighted images were obtained. 10cc of Gadavist were intravenously   injected, 0 cc were discarded.      Comparison is made with the prior brain CT of 12/5/2022 and MRI 11/5/2022.    Mild ventricular and sulcal prominence is consistent with moderate   atrophy for the patient's age. No acute hemorrhage is identified. There   is a focus of hemosiderin within the alexis which is calcified on CT.   Scattered additional foci of hemosiderin deposition are identified in the   left frontal subcortical white matter and right occipital cortex is   nonspecific but may be related to multiple cavernoma is or hypertension.    There is a tiny focus of diffusion restriction in the right frontal   subcortical white matter and right centrum semiovale which are unchanged   since the prior exam and may represent subacute infarcts. Multiplicity   infarcts may be related to hypercoagulable state or cardioembolic events.    After contrast administration there is normal intracranial vascular   enhancement. No abnormal parenchymal or leptomeningeal enhancement.      IMPRESSION: Atrophy for the patient's age. Right frontal subcortical and   right centrum semiovale punctate infarcts are unchanged since 11/5/2022   and likely represent subacute infarcts. No abnormal enhancement. Focus of   calcification in the alexis with old hemosiderin. A few additional   scattered foci of hemosiderin deposition are unchanged.    --- End of Report ---            JUAN MANUEL CASTILLO MD; Attending Radiologist  This document has been electronically signed. Dec  9 2022  8:05PM    < end of copied text >

## 2022-12-11 NOTE — PROGRESS NOTE ADULT - SUBJECTIVE AND OBJECTIVE BOX
Name of Patient : ATMI DUNHAM  MRN: [id number]  Date of visit: 12-11-22       Subjective: Patient seen and examined. No new events except as noted.   calm,    REVIEW OF SYSTEMS:  limited     MEDICATIONS:  MEDICATIONS  (STANDING):  aspirin  chewable 81 milliGRAM(s) Oral daily  bisacodyl Suppository 10 milliGRAM(s) Rectal daily  chlorhexidine 2% Cloths 1 Application(s) Topical daily  dextrose 5% + sodium chloride 0.9%. 1000 milliLiter(s) (50 mL/Hr) IV Continuous <Continuous>  dextrose 50% Injectable 25 Gram(s) IV Push once  dextrose 50% Injectable 12.5 Gram(s) IV Push once  dextrose 50% Injectable 25 Gram(s) IV Push once  dextrose Oral Gel 15 Gram(s) Oral once  diphtheria/tetanus/pertussis (acellular) Vaccine (Adacel) 0.5 milliLiter(s) IntraMuscular once  glucagon  Injectable 1 milliGRAM(s) IntraMuscular once  heparin  Infusion. 1300 Unit(s)/Hr (13 mL/Hr) IV Continuous <Continuous>  lacosamide IVPB 100 milliGRAM(s) IV Intermittent every 12 hours  levETIRAcetam  IVPB 1500 milliGRAM(s) IV Intermittent every 12 hours  potassium chloride  10 mEq/100 mL IVPB 10 milliEquivalent(s) IV Intermittent every 1 hour      PHYSICAL EXAM:  T(C): 37.3 (12-11-22 @ 17:30), Max: 37.4 (12-10-22 @ 22:00)  HR: 109 (12-11-22 @ 17:30) (101 - 109)  BP: 135/92 (12-11-22 @ 17:30) (135/92 - 140/94)  RR: 18 (12-11-22 @ 04:38) (18 - 18)  SpO2: 96% (12-11-22 @ 17:30) (96% - 97%)  Wt(kg): --  I&O's Summary        Appearance: awake, calm 	  HEENT:  PERRLA   Lymphatic: No lymphadenopathy   Cardiovascular: Normal S1 S2, no JVD  Respiratory: normal effort , clear  Gastrointestinal:  Soft, Non-tender  Skin: No rashes,  warm to touch  Psychiatry:  Mood & affect appropriate  Musculuskeletal: No edema      All labs, Imaging and EKGs personally reviewed                           9.1 20.54 )-----------( 269      ( 11 Dec 2022 06:40 )             27.0               12-11    139  |  106  |  21  ----------------------------<  129<H>  3.6   |  22  |  0.79    Ca    8.4      11 Dec 2022 06:40  Phos  3.3     12-11  Mg     2.00     12-11    TPro  6.3  /  Alb  2.9<L>  /  TBili  1.4<H>  /  DBili  x   /  AST  31  /  ALT  49<H>  /  AlkPhos  96  12-11    PTT - ( 11 Dec 2022 06:40 )  PTT:73.4 sec       CARDIAC MARKERS ( 11 Dec 2022 06:40 )  x     / x     / 427 U/L / x     / x      CARDIAC MARKERS ( 10 Dec 2022 06:51 )  x     / x     / 616 U/L / x     / x

## 2022-12-11 NOTE — PROGRESS NOTE ADULT - ASSESSMENT
52-year-old male with a PMHx significant for TIAs (2011, 2013), CVAs x3 (02/2022 s/p tPA, 8/3/2022 s/p tPA, 11/5/2022 with residual expressive aphasia) on Eliquis, and HLD BIBEMS 12/2 after being found unresponsive at home with possible aspiration pna, s/p micu stay, leukocytosis   intubated in ICU  initially treated with vanco ceftriaxone for empiric meningitis then zosyn for suspected aspiration   completed 12/10    now extubated on floor     Leucocytosis, encephalopathy     neurology following EEG done  afebrile but WBC 20K    left knee swollen warm  concern for inflammatory or septic arthritis  mental status waxing/ waning    blood cultures negative    suggest:  ortho eval left knee    agree with neurology suggestion for LP    call ID service with any questions over weekend 52-year-old male with a PMHx significant for TIAs (2011, 2013), CVAs x3 (02/2022 s/p tPA, 8/3/2022 s/p tPA, 11/5/2022 with residual expressive aphasia) on Eliquis, and HLD BIBEMS 12/2 after being found unresponsive at home with possible aspiration pna, s/p micu stay, leukocytosis, Temp 105.4  intubated in ICU  initially treated with vanco ceftriaxone for empiric meningitis then zosyn for suspected aspiration   completed 12/10    now extubated on floor     Leucocytosis, encephalopathy     neurology following EEG done  afebrile but WBC 20K    left knee swollen warm  concern for inflammatory or septic arthritis  mental status waxing/ waning    blood cultures negative    suggest:  ortho eval left knee    agree with neurology suggestion for LP    call ID service with any questions over weekend

## 2022-12-11 NOTE — PROGRESS NOTE ADULT - ASSESSMENT
52-year-old male with a PMHx significant for TIAs (2011, 2013), CVAs x3 (02/2022 s/p tPA, 8/3/2022 s/p tPA, 11/5/2022 with residual expressive aphasia) on Eliquis, and HLD BIBEMS after being found unresponsive at home on the floor, last known well 12/1 at around noon. C-collar was placed. Patient was intubated in the ED for airway protection. CTH with no acute findings, vEEG with no identified seizures. Patient was weaned off pressors, extubated, and transitioned to floors 12/4/22. 12/5 am RRT called for seizure like activity with urinary incontinence, tongue biting, and R>L posturing, patient s/p ativan 2mg x3. Anesthesia called to RRT for intubation. MICU accepting patient for seizure like activity requiring intubation.      #AMS, Seizure like activity  EEG 12/7: concern for epileptiform activity; though EEG from 12/5 without such abnormalities.   - f/u neuro recs  - Patient found down and unresponsive at home on 12/2, last known well 12/1 at around noon. Intubated in ED 12/2, extubated in MICU 12/4  - RRT 12/5: seizure like acting with urinary incontinence, convulsions, and tongue biting; s/p ativan 2mg x3 with pauses in seizure activity following each; intubated for airway protection with rocuronium. Prolactin ~75   - CTH and CT cervical spine 12/2 negative for any acute pathologies. Tox screen on admission negative. 12/5: Stable exam from prior CT head, chronic microvascular ischemic changes, parenchymal loss, dystrophic calcification of central portion of alexis unchanged.  - Video EEG 12/3-12/4 without any seizure like activity, moderate to severe nonspecific diffuse or multifocal cerebral dysfunction  - propofol off since 3am 12/6  - c/w keppra 1500mg q12 maintenance, Keppra loaded 4.5g  - patient extubated again on 12/8th  -MRI noted, chronci CVA, no acute CVA noted     please call house Endocrine as per family, patient has been taking testosterone supplement prior to this episodes discussed with neuro     #CVAs/TIAs  - Hx of TIAs in 2011 and 2013, CVAs x3 in 02/22, 8/22, and 11/22 with residual expressive aphasia   - on Eliquis and Aspirin at home for hx of stroke  - PFO work up in past negative, no evidence of PFO on MIMI X2  - c/w ASA   - c/w heparin gtt  - Restart Statin when LFTs and CPK improve per neurology  - Being worked up for Mixed Connective Tissue Disease OP- f/u p-ANCA, c-ANCA/ [id number] negative  - Neuro recs appreciated    #Thecal sac flattening   - CT thoracic and lumbar spine showing degenerative disc disease T6-T7 through L4-L5 and mild disc bulges at L2-L3 through L4-L5 that flatten the ventral thecal sac and narrowing of the bilateral neural foramina  - Will Monitor for now, will consider neuro/neurosurg evaluation in the future    #HLD  - atorvastatin on hold due to elevated CK and LFTs  - restart when possible for secondary stroke prevention    #?ASD/PFO  - Patient reportedly found to have a PFO in February 2022 during a stroke workup at [place]. Patient presented for a PFO closure in November 2022. Notably, no PFO was found at the time and no intervention was performed by Dr. Lynn.    - TTE 11/5/22 EF 57% and grossly normal LV function  - MIMI performed bedside 12/5 1 of 2 studies (+) on bubble study (uploaded to [facility])  - Contacted Dr. Cesar who saw patient for PFO back in 11/2022, f/u recs      #Rhabdomyolysis  - CK 8720 on admission, peaked at 15k, now downtrending  - DDx: prolonged downtime myositis v hyperthermia? (T 105.4 F) v sepsis v seizure induced  - stopped IVF 12/7 since CK ~2k  - Continue checking CK daily      #Partial SBO - resolved  - CT chest, abdomen, and pelvis w/ contrast concerning for possible partial SBO without transition point.  - Surgery consulted, no acute intervention at this time  - 12/5 KUB - negative for ileus or SBO  - Hold TF for 12/7 for possible trial of extubation.  - otherwise diet per nutrition      #Leukocytosis  - Concern for septic process  - Trend CBCs    #Concern for sepsis  Unclear source, aspiration v less likely meningitis   BCx (12/2 NGTD repeat 12/5 NGTD) and UC 12/2 NGTD  - Patient initially presented with AMS, T 105.4, tachycardic, and tachypneic   - UA negative  - s/p vanco and zosyn x1 in ED 12/2, ceftriaxone 2g BID 12/3-12/4, vanco 12/3-12/4  - 12/7 DCed Vanc since BCx from 12/5 NGTD  - c/w zosyn for aspiration pna presumed. completed course, monitor   - ID eval called   - Complete and monitor for recurrent fever, if febrile, may need LP

## 2022-12-11 NOTE — PROGRESS NOTE ADULT - ASSESSMENT
52-year-old  M known to me from outpatient setting with  TIAs (2011, 2013), Strokes x3 (02/2022 s/p tPA, 8/3/2022 s/p tPA, 11/5/2022 with residual expressive aphasia) on Eliquis, was on testosterone outpatient stopped after last stroke, HLD BIBEMS after being found unresponsive  Temp 105.4 on arrival tachy and /110. 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   CT cervical spine and maxillofacial scans negative.  CT chest, abdomen, and pelvis w/ contrast concerning for possible partial SBO without transition point. Surgery consulted in ED, no acute surgical intervention at this time.   CT thoracic and lumbar spine showing degenerative disc disease T6-T7 through L4-L5 and mild disc bulges at L2-L3 through L4-L5 that flatten the ventral thecal sac and narrowing of the bilateral neural foramina.  MRI 11/2022: R centrum semiovale and R posterior frontal infarcts   takes adderall at home   + rhabdo  EEG no seizures   + transaminitis   CTH 12/5 stable   outpatient hypercoag workup neg   12/5 extubated then reintibuated for seizure activity and tx back to ICU   EEG this AM 12/5 with only slowing   EEG 12/6 slowing but no seizures   spoke with Winter Conti (friend) who states after he was taken off the testosterone he ordered a mail order testosterone supplement, unclear if he started it yet, unclear what this was  12/7 EEG no electrogtraphic seizures captured but R LPDs, rare L frontal discharges, severe GRDA.   12/8 EEG improved.  extubated. following some commands   12/9 moving uppers> lowers   12/11 AAOx2 uppers 4-5/5; not moving LLE well   MRI brain neg for new stroke     Impression:   1) AMS of unclear etiology, possible now seizure, related to adderral withdrawal? 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 had MIMI was question of PFO but not found. s/p ILR     - started  Vimpat 100mg BID    - keppra 1500mg BID.   - fever on arrival, treatred initially for meningitis.  was on abx for aspiration PNA.  would consider LP at this point ; unclear etiology of initial fever   - monitor LFTs , downtrending   - IVF  - CPK elevated. trend   - would consider endocrine   - there was some concern outpatient he may have a mixed  connective tissue disorder   - statin therapy for secondary stroke prevention when LFTS and CPK improve   - on heparin drip    - telemetry  - PT/OT/SS/SLP, OOBC  - check FS, glucose control <180  - GI/DVT ppx  - Thank you for allowing me to participate in the care of this patient. Call with questions.   - spoke with Winter Conti at [phone] for more collateral info and to provide update.   - spoke with friend at bedside   Braxton Forde MD  Vascular Neurology  Office: [phone]

## 2022-12-11 NOTE — PROGRESS NOTE ADULT - SUBJECTIVE AND OBJECTIVE BOX
Subjective: Patient seen and examined. No new events except as noted.   Slightly tachycardic, Afebrile.  Pending ECHO.    REVIEW OF SYSTEMS:    CONSTITUTIONAL: + weakness, fevers or chills  EYES/ENT: No visual changes;  No vertigo or throat pain   NECK: No pain or stiffness  RESPIRATORY: No cough, wheezing, hemoptysis; No shortness of breath  CARDIOVASCULAR: No chest pain or palpitations  GASTROINTESTINAL: No abdominal or epigastric pain. No nausea, vomiting, or hematemesis; No diarrhea or constipation. No melena or hematochezia.  GENITOURINARY: No dysuria, frequency or hematuria  NEUROLOGICAL: No numbness or weakness  SKIN: No itching, burning, rashes, or lesions   All other review of systems is negative unless indicated above.    MEDICATIONS:  MEDICATIONS  (STANDING):  aspirin  chewable 81 milliGRAM(s) Oral daily  bisacodyl Suppository 10 milliGRAM(s) Rectal daily  chlorhexidine 2% Cloths 1 Application(s) Topical daily  dextrose 5% + sodium chloride 0.9%. 1000 milliLiter(s) (50 mL/Hr) IV Continuous <Continuous>  diphtheria/tetanus/pertussis (acellular) Vaccine (Adacel) 0.5 milliLiter(s) IntraMuscular once  heparin  Infusion. 1300 Unit(s)/Hr (13 mL/Hr) IV Continuous <Continuous>  lacosamide IVPB 100 milliGRAM(s) IV Intermittent every 12 hours  levETIRAcetam  IVPB 1500 milliGRAM(s) IV Intermittent every 12 hours  piperacillin/tazobactam IVPB.. 3.375 Gram(s) IV Intermittent every 8 hours  polyethylene glycol 3350 17 Gram(s) Oral daily  potassium chloride   Powder 40 milliEquivalent(s) Oral once    PHYSICAL EXAM:  Vital Signs Last 24 Hrs  T(C): 37.2 (11 Dec 2022 04:38), Max: 37.4 (10 Dec 2022 22:00)  T(F): 98.9 (11 Dec 2022 04:38), Max: 99.4 (10 Dec 2022 22:00)  HR: 101 (11 Dec 2022 04:38) (94 - 102)  BP: 136/78 (11 Dec 2022 04:38) (136/78 - 142/87)  BP(mean): --  RR: 18 (11 Dec 2022 04:38) (17 - 18)  SpO2: 97% (11 Dec 2022 04:38) (96% - 97%)    Parameters below as of 11 Dec 2022 04:38  Patient On (Oxygen Delivery Method): room air    I&O's Summary    Appearance: NAD  HEENT: dry oral mucosa, PERRL, EOMI - L temporal abrasion	  Lymphatic: No lymphadenopathy , no edema  Cardiovascular: Normal S1 S2, No JVD, No murmurs , Peripheral pulses palpable 2+ bilaterally  Respiratory: Coarse BS	  Gastrointestinal:  Soft, Non-tender, + BS	  Skin: No rashes, + ecchymoses, No cyanosis, warm to touch - BL mittes  Neurological Exam:    Mental Status: Awake, Oriented x 1-2, followign some simple. minimal verbal saying few words,   Cranial Nerves: PERRL, EOMI, VFFC, sensation V1-V3 intact,  no obvious facial asymmetry    Motor: Moving uppers > lowers. uppers at least 3-4/5 in mittens   Sensation:minimal withdrawal to noxious x 4  R>L   Reflexes: 1+ throughout at biceps, brachioradialis, triceps, patellars and ankles bilaterally and equal. No clonus. R toe and L toe were both downgoing.  Coordination: unable   Gait: unable   Ext: No edema    LABS:    CARDIAC MARKERS:                        9.1    20.54 )-----------( 269      ( 11 Dec 2022 06:40 )             27.0     12-11    139  |  106  |  21  ----------------------------<  129<H>  3.6   |  22  |  0.79    Ca    8.4      11 Dec 2022 06:40  Phos  3.3     12-11  Mg     2.00     12-11    TPro  6.3  /  Alb  2.9<L>  /  TBili  1.4<H>  /  DBili  x   /  AST  31  /  ALT  49<H>  /  AlkPhos  96  12-11    proBNP:   Lipid Profile:   HgA1c:   TSH:     TELEMETRY: SR/ST	    ECG:  	  RADIOLOGY:   DIAGNOSTIC TESTING:  [ ] Echocardiogram:  [ ]  Catheterization:  [ ] Stress Test:    OTHER:

## 2022-12-12 DIAGNOSIS — R50.9 FEVER, UNSPECIFIED: ICD-10-CM

## 2022-12-12 DIAGNOSIS — M25.00 HEMARTHROSIS, UNSPECIFIED JOINT: ICD-10-CM

## 2022-12-12 LAB
ALBUMIN SERPL ELPH-MCNC: 3 G/DL — LOW (ref 3.3–5)
ALP SERPL-CCNC: 99 U/L — SIGNIFICANT CHANGE UP (ref 40–120)
ALT FLD-CCNC: 46 U/L — HIGH (ref 4–41)
ANION GAP SERPL CALC-SCNC: 13 MMOL/L — SIGNIFICANT CHANGE UP (ref 7–14)
APTT BLD: 52.8 SEC — HIGH (ref 27–36.3)
AST SERPL-CCNC: 31 U/L — SIGNIFICANT CHANGE UP (ref 4–40)
B PERT DNA SPEC QL NAA+PROBE: SIGNIFICANT CHANGE UP
B PERT IGG+IGM PNL SER: ABNORMAL
B PERT+PARAPERT DNA PNL SPEC NAA+PROBE: SIGNIFICANT CHANGE UP
BILIRUB SERPL-MCNC: 2.3 MG/DL — HIGH (ref 0.2–1.2)
BORDETELLA PARAPERTUSSIS (RAPRVP): SIGNIFICANT CHANGE UP
BUN SERPL-MCNC: 24 MG/DL — HIGH (ref 7–23)
C PNEUM DNA SPEC QL NAA+PROBE: SIGNIFICANT CHANGE UP
CALCIUM SERPL-MCNC: 8.3 MG/DL — LOW (ref 8.4–10.5)
CHLORIDE SERPL-SCNC: 108 MMOL/L — HIGH (ref 98–107)
CK SERPL-CCNC: 506 U/L — HIGH (ref 30–200)
CO2 SERPL-SCNC: 22 MMOL/L — SIGNIFICANT CHANGE UP (ref 22–31)
COLOR FLD: ABNORMAL
COMMENT - FLUIDS: SIGNIFICANT CHANGE UP
CREAT SERPL-MCNC: 0.84 MG/DL — SIGNIFICANT CHANGE UP (ref 0.5–1.3)
CRP SERPL-MCNC: 281 MG/L — HIGH
EGFR: 105 ML/MIN/1.73M2 — SIGNIFICANT CHANGE UP
ERYTHROCYTE [SEDIMENTATION RATE] IN BLOOD: 95 MM/HR — HIGH (ref 1–15)
FLUAV SUBTYP SPEC NAA+PROBE: SIGNIFICANT CHANGE UP
FLUBV RNA SPEC QL NAA+PROBE: SIGNIFICANT CHANGE UP
FLUID INTAKE SUBSTANCE CLASS: SIGNIFICANT CHANGE UP
GLUCOSE BLDC GLUCOMTR-MCNC: 131 MG/DL — HIGH (ref 70–99)
GLUCOSE BLDC GLUCOMTR-MCNC: 139 MG/DL — HIGH (ref 70–99)
GLUCOSE BLDC GLUCOMTR-MCNC: 141 MG/DL — HIGH (ref 70–99)
GLUCOSE BLDC GLUCOMTR-MCNC: 143 MG/DL — HIGH (ref 70–99)
GLUCOSE SERPL-MCNC: 137 MG/DL — HIGH (ref 70–99)
GRAM STN FLD: SIGNIFICANT CHANGE UP
HADV DNA SPEC QL NAA+PROBE: SIGNIFICANT CHANGE UP
HCOV 229E RNA SPEC QL NAA+PROBE: SIGNIFICANT CHANGE UP
HCOV HKU1 RNA SPEC QL NAA+PROBE: SIGNIFICANT CHANGE UP
HCOV NL63 RNA SPEC QL NAA+PROBE: SIGNIFICANT CHANGE UP
HCOV OC43 RNA SPEC QL NAA+PROBE: SIGNIFICANT CHANGE UP
HCT VFR BLD CALC: 24.7 % — LOW (ref 39–50)
HGB BLD-MCNC: 8.2 G/DL — LOW (ref 13–17)
HMPV RNA SPEC QL NAA+PROBE: SIGNIFICANT CHANGE UP
HPIV1 RNA SPEC QL NAA+PROBE: SIGNIFICANT CHANGE UP
HPIV2 RNA SPEC QL NAA+PROBE: SIGNIFICANT CHANGE UP
HPIV3 RNA SPEC QL NAA+PROBE: SIGNIFICANT CHANGE UP
HPIV4 RNA SPEC QL NAA+PROBE: SIGNIFICANT CHANGE UP
INR BLD: 1.34 RATIO — HIGH (ref 0.88–1.16)
LACTATE SERPL-SCNC: 1.2 MMOL/L — SIGNIFICANT CHANGE UP (ref 0.5–2)
LYMPHOCYTES # FLD: 1 % — SIGNIFICANT CHANGE UP
M PNEUMO DNA SPEC QL NAA+PROBE: SIGNIFICANT CHANGE UP
MAGNESIUM SERPL-MCNC: 2.1 MG/DL — SIGNIFICANT CHANGE UP (ref 1.6–2.6)
MCHC RBC-ENTMCNC: 29.7 PG — SIGNIFICANT CHANGE UP (ref 27–34)
MCHC RBC-ENTMCNC: 33.2 GM/DL — SIGNIFICANT CHANGE UP (ref 32–36)
MCV RBC AUTO: 89.5 FL — SIGNIFICANT CHANGE UP (ref 80–100)
MONOS+MACROS # FLD: 1 % — SIGNIFICANT CHANGE UP
NEUTROPHILS-BODY FLUID: 98 % — SIGNIFICANT CHANGE UP
NRBC # BLD: 0 /100 WBCS — SIGNIFICANT CHANGE UP (ref 0–0)
NRBC # FLD: 0 K/UL — SIGNIFICANT CHANGE UP (ref 0–0)
PHOSPHATE SERPL-MCNC: 3.2 MG/DL — SIGNIFICANT CHANGE UP (ref 2.5–4.5)
PLATELET # BLD AUTO: 289 K/UL — SIGNIFICANT CHANGE UP (ref 150–400)
POTASSIUM SERPL-MCNC: 3.5 MMOL/L — SIGNIFICANT CHANGE UP (ref 3.5–5.3)
POTASSIUM SERPL-SCNC: 3.5 MMOL/L — SIGNIFICANT CHANGE UP (ref 3.5–5.3)
PROT SERPL-MCNC: 6.3 G/DL — SIGNIFICANT CHANGE UP (ref 6–8.3)
PROTHROM AB SERPL-ACNC: 15.6 SEC — HIGH (ref 10.5–13.4)
RAPID RVP RESULT: SIGNIFICANT CHANGE UP
RBC # BLD: 2.76 M/UL — LOW (ref 4.2–5.8)
RBC # FLD: 15.3 % — HIGH (ref 10.3–14.5)
RCV VOL RI: SIGNIFICANT CHANGE UP CELLS/UL (ref 0–5)
RSV RNA SPEC QL NAA+PROBE: SIGNIFICANT CHANGE UP
RV+EV RNA SPEC QL NAA+PROBE: SIGNIFICANT CHANGE UP
SARS-COV-2 RNA SPEC QL NAA+PROBE: SIGNIFICANT CHANGE UP
SODIUM SERPL-SCNC: 143 MMOL/L — SIGNIFICANT CHANGE UP (ref 135–145)
SPECIMEN SOURCE: SIGNIFICANT CHANGE UP
SYNOVIAL CRYSTALS CLARITY: ABNORMAL
SYNOVIAL CRYSTALS COLOR: ABNORMAL
SYNOVIAL CRYSTALS ID: SIGNIFICANT CHANGE UP
SYNOVIAL CRYSTALS TUBE: SIGNIFICANT CHANGE UP
TOTAL NUCLEATED CELL COUNT, BODY FLUID: SIGNIFICANT CHANGE UP CELLS/UL (ref 0–5)
TUBE TYPE: SIGNIFICANT CHANGE UP
URATE SERPL-MCNC: 2.9 MG/DL — LOW (ref 3.4–8.8)
WBC # BLD: 28.88 K/UL — HIGH (ref 3.8–10.5)
WBC # FLD AUTO: 28.88 K/UL — HIGH (ref 3.8–10.5)

## 2022-12-12 PROCEDURE — 73560 X-RAY EXAM OF KNEE 1 OR 2: CPT | Mod: 26,LT

## 2022-12-12 PROCEDURE — 99221 1ST HOSP IP/OBS SF/LOW 40: CPT

## 2022-12-12 PROCEDURE — 93010 ELECTROCARDIOGRAM REPORT: CPT

## 2022-12-12 PROCEDURE — 76700 US EXAM ABDOM COMPLETE: CPT | Mod: 26

## 2022-12-12 PROCEDURE — 99232 SBSQ HOSP IP/OBS MODERATE 35: CPT

## 2022-12-12 PROCEDURE — 88108 CYTOPATH CONCENTRATE TECH: CPT | Mod: 26

## 2022-12-12 RX ORDER — POTASSIUM CHLORIDE 20 MEQ
10 PACKET (EA) ORAL
Refills: 0 | Status: COMPLETED | OUTPATIENT
Start: 2022-12-12 | End: 2022-12-12

## 2022-12-12 RX ORDER — ACETAMINOPHEN 500 MG
1000 TABLET ORAL ONCE
Refills: 0 | Status: COMPLETED | OUTPATIENT
Start: 2022-12-12 | End: 2022-12-12

## 2022-12-12 RX ADMIN — CHLORHEXIDINE GLUCONATE 1 APPLICATION(S): 213 SOLUTION TOPICAL at 14:18

## 2022-12-12 RX ADMIN — Medication 400 MILLIGRAM(S): at 10:53

## 2022-12-12 RX ADMIN — LACOSAMIDE 120 MILLIGRAM(S): 50 TABLET ORAL at 18:07

## 2022-12-12 RX ADMIN — LACOSAMIDE 120 MILLIGRAM(S): 50 TABLET ORAL at 05:45

## 2022-12-12 RX ADMIN — Medication 100 MILLIEQUIVALENT(S): at 12:58

## 2022-12-12 RX ADMIN — HEPARIN SODIUM 2300 UNIT(S)/HR: 5000 INJECTION INTRAVENOUS; SUBCUTANEOUS at 07:13

## 2022-12-12 RX ADMIN — HEPARIN SODIUM 2300 UNIT(S)/HR: 5000 INJECTION INTRAVENOUS; SUBCUTANEOUS at 02:57

## 2022-12-12 RX ADMIN — Medication 100 MILLIEQUIVALENT(S): at 11:15

## 2022-12-12 RX ADMIN — Medication 100 MILLIEQUIVALENT(S): at 14:10

## 2022-12-12 RX ADMIN — LEVETIRACETAM 400 MILLIGRAM(S): 250 TABLET, FILM COATED ORAL at 05:45

## 2022-12-12 RX ADMIN — LEVETIRACETAM 400 MILLIGRAM(S): 250 TABLET, FILM COATED ORAL at 17:45

## 2022-12-12 NOTE — SWALLOW BEDSIDE ASSESSMENT ADULT - ADDITIONAL RECOMMENDATIONS
1. This department to follow up as schedule permits for PO candidacy/Candidacy of objective testing. 2. Reconsult this department if there is a change in medical status/condition becomes medically optimized. 1. This department to follow up as schedule permits for PO candidacy/Candidacy of objective testing.   2. Reconsult this department if there is a change in medical status/condition becomes medically optimized.

## 2022-12-12 NOTE — PROGRESS NOTE ADULT - SUBJECTIVE AND OBJECTIVE BOX
Name of Patient : TAMI DUNHAM  MRN: [id number]  Date of visit: 12-12-22       Subjective: Patient seen and examined. No new events except as noted.   awake, calm     REVIEW OF SYSTEMS:  limited     MEDICATIONS:  MEDICATIONS  (STANDING):  aspirin  chewable 81 milliGRAM(s) Oral daily  bisacodyl Suppository 10 milliGRAM(s) Rectal daily  chlorhexidine 2% Cloths 1 Application(s) Topical daily  dextrose 5% + sodium chloride 0.9%. 1000 milliLiter(s) (50 mL/Hr) IV Continuous <Continuous>  dextrose 50% Injectable 25 Gram(s) IV Push once  dextrose 50% Injectable 12.5 Gram(s) IV Push once  dextrose 50% Injectable 25 Gram(s) IV Push once  dextrose Oral Gel 15 Gram(s) Oral once  diphtheria/tetanus/pertussis (acellular) Vaccine (Adacel) 0.5 milliLiter(s) IntraMuscular once  glucagon  Injectable 1 milliGRAM(s) IntraMuscular once  lacosamide IVPB 100 milliGRAM(s) IV Intermittent every 12 hours  levETIRAcetam  IVPB 1500 milliGRAM(s) IV Intermittent every 12 hours      PHYSICAL EXAM:  T(C): 37.6 (12-12-22 @ 13:02), Max: 38.4 (12-12-22 @ 10:44)  HR: 97 (12-12-22 @ 13:02) (97 - 108)  BP: 123/82 (12-12-22 @ 13:02) (121/72 - 144/92)  RR: 18 (12-12-22 @ 13:02) (18 - 19)  SpO2: 96% (12-12-22 @ 13:02) (96% - 96%)  Wt(kg): --  I&O's Summary        Appearance: aake, calm   HEENT:  PERRLA   Lymphatic: No lymphadenopathy   Cardiovascular: Normal S1 S2, no JVD  Respiratory: normal effort , clear  Gastrointestinal:  Soft, Non-tender  Skin: No rashes,  warm to touch  Psychiatry:  Mood & affect appropriate  Musculuskeletal: Knee swelling       All labs, Imaging and EKGs personally reviewed     Culture - Body Fluid with Gram Stain (collected 12-12-22 @ 10:55)  Source: .Body Fluid Synovial Fluid  Gram Stain (12-12-22 @ 15:59):    polymorphonuclear leukocytes    No organisms seen    by cytocentrifuge                          8.2    28.88 )-----------( 289      ( 12 Dec 2022 06:05 )             24.7               12-12    143  |  108<H>  |  24<H>  ----------------------------<  137<H>  3.5   |  22  |  0.84    Ca    8.3<L>      12 Dec 2022 06:05  Phos  3.2     12-12  Mg     2.10     12-12    TPro  6.3  /  Alb  3.0<L>  /  TBili  2.3<H>  /  DBili  x   /  AST  31  /  ALT  46<H>  /  AlkPhos  99  12-12    PT/INR - ( 12 Dec 2022 06:05 )   PT: 15.6 sec;   INR: 1.34 ratio         PTT - ( 12 Dec 2022 06:05 )  PTT:52.8 sec       CARDIAC MARKERS ( 12 Dec 2022 06:05 )  x     / x     / 506 U/L / x     / x      CARDIAC MARKERS ( 11 Dec 2022 06:40 )  x     / x     / 427 U/L / x     / x

## 2022-12-12 NOTE — SWALLOW BEDSIDE ASSESSMENT ADULT - SWALLOW EVAL: RECOMMENDED DIET
1. Consider NPO with consideration of short-term non-oral means of nutrition for caloric/hydration/medication needs.

## 2022-12-12 NOTE — PROGRESS NOTE ADULT - ASSESSMENT
52-year-old male with a PMHx significant for TIAs (2011, 2013), CVAs x3 (02/2022 s/p tPA, 8/3/2022 s/p tPA, 11/5/2022 with residual expressive aphasia) on Eliquis, and HLD BIBEMS after being found unresponsive at home on the floor, last known well 12/1 at around noon. C-collar was placed. Patient was intubated in the ED for airway protection. CTH with no acute findings, vEEG with no identified seizures. Patient was weaned off pressors, extubated, and transitioned to floors 12/4/22. 12/5 am RRT called for seizure like activity with urinary incontinence, tongue biting, and R>L posturing, patient s/p ativan 2mg x3. Anesthesia called to RRT for intubation. MICU accepting patient for seizure like activity requiring intubation.      #AMS, Seizure like activity  EEG 12/7: concern for epileptiform activity; though EEG from 12/5 without such abnormalities.   - f/u neuro recs  - Patient found down and unresponsive at home on 12/2, last known well 12/1 at around noon. Intubated in ED 12/2, extubated in MICU 12/4  - RRT 12/5: seizure like acting with urinary incontinence, convulsions, and tongue biting; s/p ativan 2mg x3 with pauses in seizure activity following each; intubated for airway protection with rocuronium. Prolactin ~75   - CTH and CT cervical spine 12/2 negative for any acute pathologies. Tox screen on admission negative. 12/5: Stable exam from prior CT head, chronic microvascular ischemic changes, parenchymal loss, dystrophic calcification of central portion of alexis unchanged.  - Video EEG 12/3-12/4 without any seizure like activity, moderate to severe nonspecific diffuse or multifocal cerebral dysfunction  - propofol off since 3am 12/6  - c/w keppra 1500mg q12 maintenance, Keppra loaded 4.5g  - patient extubated again on 12/8th  -MRI noted, chronci CVA, no acute CVA noted         #CVAs/TIAs  - Hx of TIAs in 2011 and 2013, CVAs x3 in 02/22, 8/22, and 11/22 with residual expressive aphasia   - on Eliquis and Aspirin at home for hx of stroke  - PFO work up in past negative, no evidence of PFO on MIMI X2  - c/w ASA   - c/w heparin gtt  - Restart Statin when LFTs and CPK improve per neurology  - Being worked up for Mixed Connective Tissue Disease OP- f/u p-ANCA, c-ANCA/ [id number] negative  - Neuro recs appreciated    #Thecal sac flattening   - CT thoracic and lumbar spine showing degenerative disc disease T6-T7 through L4-L5 and mild disc bulges at L2-L3 through L4-L5 that flatten the ventral thecal sac and narrowing of the bilateral neural foramina  - Will Monitor for now, will consider neuro/neurosurg evaluation in the future    #HLD  - atorvastatin on hold due to elevated CK and LFTs  - restart when possible for secondary stroke prevention    #?ASD/PFO  - Patient reportedly found to have a PFO in February 2022 during a stroke workup at [facility]. Patient presented for a PFO closure in November 2022. Notably, no PFO was found at the time and no intervention was performed by Dr. Lynn.    - TTE 11/5/22 EF 57% and grossly normal LV function  - MMII performed bedside 12/5 1 of 2 studies (+) on bubble study (uploaded to [facility])  - Contacted Dr. Cesar who saw patient for PFO back in 11/2022, f/u recs      #Rhabdomyolysis  - CK 8720 on admission, peaked at 15k, now downtrending  - DDx: prolonged downtime myositis v hyperthermia? (T 105.4 F) v sepsis v seizure induced  - stopped IVF 12/7 since CK ~2k  - Continue checking CK daily    # Elevated Winston level  CHeck Abd US   GI eval PRN   Trend LFTs       #Partial SBO - resolved  - CT chest, abdomen, and pelvis w/ contrast concerning for possible partial SBO without transition point.  - Surgery consulted, no acute intervention at this time  - 12/5 KUB - negative for ileus or SBO  - Hold TF for 12/7 for possible trial of extubation.  - otherwise diet per nutrition      #Leukocytosis  - worsening leukocytosis  - monitor for fever  - Ortho eval fro knee swelling   - Pan Cx      #Concern for sepsis  Unclear source, aspiration v less likely meningitis   BCx (12/2 NGTD repeat 12/5 NGTD) and UC 12/2 NGTD  - Patient initially presented with AMS, T 105.4, tachycardic, and tachypneic   - UA negative  - s/p vanco and zosyn x1 in ED 12/2, ceftriaxone 2g BID 12/3-12/4, vanco 12/3-12/4  - 12/7 DCed Vanc since BCx from 12/5 NGTD  - c/w zosyn for aspiration pna presumed. completed course, monitor   - ID eval appreciated  - febrile again, Ortho for knee asp

## 2022-12-12 NOTE — PROGRESS NOTE ADULT - SUBJECTIVE AND OBJECTIVE BOX
Subjective: Patient seen and examined. No new events except as noted.     REVIEW OF SYSTEMS:    CONSTITUTIONAL:+ weakness, fevers or chills  EYES/ENT: No visual changes;  No vertigo or throat pain   NECK: No pain or stiffness  RESPIRATORY: No cough, wheezing, hemoptysis; No shortness of breath  CARDIOVASCULAR: No chest pain or palpitations  GASTROINTESTINAL: No abdominal or epigastric pain. No nausea, vomiting, or hematemesis; No diarrhea or constipation. No melena or hematochezia.  GENITOURINARY: No dysuria, frequency or hematuria  NEUROLOGICAL: No numbness or weakness  SKIN: No itching, burning, rashes, or lesions   All other review of systems is negative unless indicated above.    MEDICATIONS:  MEDICATIONS  (STANDING):  aspirin  chewable 81 milliGRAM(s) Oral daily  bisacodyl Suppository 10 milliGRAM(s) Rectal daily  chlorhexidine 2% Cloths 1 Application(s) Topical daily  dextrose 5% + sodium chloride 0.9%. 1000 milliLiter(s) (50 mL/Hr) IV Continuous <Continuous>  dextrose 50% Injectable 25 Gram(s) IV Push once  dextrose 50% Injectable 12.5 Gram(s) IV Push once  dextrose 50% Injectable 25 Gram(s) IV Push once  dextrose Oral Gel 15 Gram(s) Oral once  diphtheria/tetanus/pertussis (acellular) Vaccine (Adacel) 0.5 milliLiter(s) IntraMuscular once  glucagon  Injectable 1 milliGRAM(s) IntraMuscular once  heparin  Infusion. 1300 Unit(s)/Hr (13 mL/Hr) IV Continuous <Continuous>  lacosamide IVPB 100 milliGRAM(s) IV Intermittent every 12 hours  levETIRAcetam  IVPB 1500 milliGRAM(s) IV Intermittent every 12 hours  potassium chloride  10 mEq/100 mL IVPB 10 milliEquivalent(s) IV Intermittent every 1 hour      PHYSICAL EXAM:  T(C): 37.1 (12-12-22 @ 05:45), Max: 37.3 (12-11-22 @ 17:30)  HR: 108 (12-12-22 @ 05:45) (102 - 109)  BP: 121/72 (12-12-22 @ 05:45) (121/72 - 144/92)  RR: 19 (12-12-22 @ 05:45) (18 - 19)  SpO2: 96% (12-12-22 @ 05:45) (96% - 96%)  Wt(kg): --  I&O's Summary          Appearance: NAD  HEENT: dry oral mucosa, PERRL, EOMI - L temporal abrasion	  Lymphatic: No lymphadenopathy , no edema  Cardiovascular: Normal S1 S2, No JVD, No murmurs , Peripheral pulses palpable 2+ bilaterally  Respiratory: Coarse BS	  Gastrointestinal:  Soft, Non-tender, + BS	  Skin: No rashes, + ecchymoses, No cyanosis, warm to touch - BL mittes  Neurological Exam:    Mental Status: Awake, Oriented x 1-2, followign some simple. minimal verbal saying few words,   Cranial Nerves: PERRL, EOMI, VFFC, sensation V1-V3 intact,  no obvious facial asymmetry    Motor: Moving uppers > lowers. uppers at least 3-4/5 in mittens   Sensation:minimal withdrawal to noxious x 4  R>L   Reflexes: 1+ throughout at biceps, brachioradialis, triceps, patellars and ankles bilaterally and equal. No clonus. R toe and L toe were both downgoing.  Coordination: unable   Gait: unable   Ext: No edema      LABS:    CARDIAC MARKERS:  CARDIAC MARKERS ( 12 Dec 2022 06:05 )  x     / x     / 506 U/L / x     / x      CARDIAC MARKERS ( 11 Dec 2022 06:40 )  x     / x     / 427 U/L / x     / x      CARDIAC MARKERS ( 10 Dec 2022 06:51 )  x     / x     / 616 U/L / x     / x                                    8.2    28.88 )-----------( 289      ( 12 Dec 2022 06:05 )             24.7     12-12    143  |  108<H>  |  24<H>  ----------------------------<  137<H>  3.5   |  22  |  0.84    Ca    8.3<L>      12 Dec 2022 06:05  Phos  3.2     12-12  Mg     2.10     12-12    TPro  6.3  /  Alb  3.0<L>  /  TBili  2.3<H>  /  DBili  x   /  AST  31  /  ALT  46<H>  /  AlkPhos  99  12-12    proBNP:   Lipid Profile:   HgA1c:   TSH:             TELEMETRY: SR	    ECG:  	  RADIOLOGY:   DIAGNOSTIC TESTING:  [ ] Echocardiogram:  [ ]  Catheterization:  [ ] Stress Test:    OTHER:

## 2022-12-12 NOTE — CHART NOTE - NSCHARTNOTEFT_GEN_A_CORE
Vital Signs Last 24 Hrs  T(C): 37.1 (12 Dec 2022 05:45), Max: 37.3 (11 Dec 2022 17:30)  T(F): 98.8 (12 Dec 2022 05:45), Max: 99.2 (11 Dec 2022 17:30)  HR: 108 (12 Dec 2022 05:45) (102 - 109)  BP: 121/72 (12 Dec 2022 05:45) (121/72 - 144/92)  BP(mean): --  RR: 19 (12 Dec 2022 05:45) (18 - 19)  SpO2: 96% (12 Dec 2022 05:45) (96% - 96%)    Parameters below as of 12 Dec 2022 05:45  Patient On (Oxygen Delivery Method): room air                          8.2    28.88 )-----------( 289      ( 12 Dec 2022 06:05 )             24.7   12-12    143  |  108<H>  |  24<H>  ----------------------------<  137<H>  3.5   |  22  |  0.84    Ca    8.3<L>      12 Dec 2022 06:05  Phos  3.2     12-12  Mg     2.10     12-12    TPro  6.3  /  Alb  3.0<L>  /  TBili  2.3<H>  /  DBili  x   /  AST  31  /  ALT  46<H>  /  AlkPhos  99  12-12    Results and case discussed with Dr. Diez. As discussed Endo consult emailed given Concern for prior testosterone use ?contributing to ams/seizure-like activity, currently NPO, repeat S/S eval ordered. Left knee swelling, esr/crp elevated, xray left knee ordered. Given concern for inflammatory or septic arthritis/elevated WBC per ID recs Ortho consult called for further evaluation Vital Signs Last 24 Hrs  T(C): 37.1 (12 Dec 2022 05:45), Max: 37.3 (11 Dec 2022 17:30)  T(F): 98.8 (12 Dec 2022 05:45), Max: 99.2 (11 Dec 2022 17:30)  HR: 108 (12 Dec 2022 05:45) (102 - 109)  BP: 121/72 (12 Dec 2022 05:45) (121/72 - 144/92)  BP(mean): --  RR: 19 (12 Dec 2022 05:45) (18 - 19)  SpO2: 96% (12 Dec 2022 05:45) (96% - 96%)    Parameters below as of 12 Dec 2022 05:45  Patient On (Oxygen Delivery Method): room air                          8.2    28.88 )-----------( 289      ( 12 Dec 2022 06:05 )             24.7   12-12    143  |  108<H>  |  24<H>  ----------------------------<  137<H>  3.5   |  22  |  0.84    Ca    8.3<L>      12 Dec 2022 06:05  Phos  3.2     12-12  Mg     2.10     12-12    TPro  6.3  /  Alb  3.0<L>  /  TBili  2.3<H>  /  DBili  x   /  AST  31  /  ALT  46<H>  /  AlkPhos  99  12-12    Results and case discussed with Dr. Diez. As discussed Endo consult emailed given Concern for prior testosterone use ?contributing to ams/seizure-like activity, currently NPO, repeat S/S eval ordered. Left knee swelling, esr/crp elevated, xray left knee ordered. Given concern for inflammatory or septic arthritis/elevated WBC per ID recs Ortho consult called for further evaluation      ADDENDUM:   Vital Signs Last 24 Hrs  T(C): 38.4 (12 Dec 2022 10:44), Max: 38.4 (12 Dec 2022 10:44)  T(F): 101.1 (12 Dec 2022 10:44), Max: 101.1 (12 Dec 2022 10:44)  HR: 107 (12 Dec 2022 10:44) (102 - 109)  BP: 133/85 (12 Dec 2022 10:44) (121/72 - 144/92)  BP(mean): --  RR: 18 (12 Dec 2022 10:44) (18 - 19)  SpO2: 96% (12 Dec 2022 10:44) (96% - 96%)    Parameters below as of 12 Dec 2022 10:44  Patient On (Oxygen Delivery Method): room air    Informed by RN that Temp 101.1F, , remains on RA 96%, patient denies chills, N/V, SOB, chest pain at this time. EKG noted with NSR. Discussed findings with Dr. Diez, sepsis workup ordered (BCX x2/UA/UCX/CXR/RVP/COVID, lactate), IV tylenol ordered, continue IVF. Hold off antibiotics for now until cultures back per ID Dr. Ramírez. Discussed case with Procedure team (Navdeep Stearns) and requested for Lumbar puncture, will followup recs.   Heparin gtt stopped as discussed with attending.   Case and xray discussed with Ortho PA, patient s/p left knee arthrocentesis, keep NPO for now, and followup fluid studies/cultures.   US abdomen ordered given elevated LFTs/bili, GI consulted for further eval/?peg eval given failed S/S. Spoke with GI resident ([id number])> no urgent intervention at this time, would followup infectious workup for possible peg eval and trend LFTs/BILI, call back GI/hepatology if acute findings on US/worsening LFTs/Bili.   Case discussed with Jennifer Harmon >no intervention, check testosterone level.   Discussed plan of care with RN and attending, will monitor closely Vital Signs Last 24 Hrs  T(C): 37.1 (12 Dec 2022 05:45), Max: 37.3 (11 Dec 2022 17:30)  T(F): 98.8 (12 Dec 2022 05:45), Max: 99.2 (11 Dec 2022 17:30)  HR: 108 (12 Dec 2022 05:45) (102 - 109)  BP: 121/72 (12 Dec 2022 05:45) (121/72 - 144/92)  BP(mean): --  RR: 19 (12 Dec 2022 05:45) (18 - 19)  SpO2: 96% (12 Dec 2022 05:45) (96% - 96%)    Parameters below as of 12 Dec 2022 05:45  Patient On (Oxygen Delivery Method): room air                          8.2    28.88 )-----------( 289      ( 12 Dec 2022 06:05 )             24.7   12-12    143  |  108<H>  |  24<H>  ----------------------------<  137<H>  3.5   |  22  |  0.84    Ca    8.3<L>      12 Dec 2022 06:05  Phos  3.2     12-12  Mg     2.10     12-12    TPro  6.3  /  Alb  3.0<L>  /  TBili  2.3<H>  /  DBili  x   /  AST  31  /  ALT  46<H>  /  AlkPhos  99  12-12    Results and case discussed with Dr. Diez. As discussed Endo consult emailed given Concern for prior testosterone use ?contributing to ams/seizure-like activity, currently NPO, repeat S/S eval ordered. Left knee swelling, esr/crp elevated, xray left knee ordered. Given concern for inflammatory or septic arthritis/elevated WBC per ID recs Ortho consult called for further evaluation      ADDENDUM:   Vital Signs Last 24 Hrs  T(C): 38.4 (12 Dec 2022 10:44), Max: 38.4 (12 Dec 2022 10:44)  T(F): 101.1 (12 Dec 2022 10:44), Max: 101.1 (12 Dec 2022 10:44)  HR: 107 (12 Dec 2022 10:44) (102 - 109)  BP: 133/85 (12 Dec 2022 10:44) (121/72 - 144/92)  BP(mean): --  RR: 18 (12 Dec 2022 10:44) (18 - 19)  SpO2: 96% (12 Dec 2022 10:44) (96% - 96%)    Parameters below as of 12 Dec 2022 10:44  Patient On (Oxygen Delivery Method): room air    Informed by RN that Temp 101.1F, , remains on RA 96%, patient denies chills, N/V, SOB, chest pain at this time. EKG noted with NSR. Discussed findings with Dr. Diez, sepsis workup ordered (BCX x2/UA/UCX/CXR/RVP/COVID, lactate), IV tylenol ordered, continue IVF. Hold off antibiotics for now until cultures back per ID Dr. Ramírez. Discussed case with Procedure team (Navdeep Stearns) and requested for Lumbar puncture, will followup recs.   Heparin gtt stopped as discussed with attending.   Case and xray discussed with Ortho PA, patient s/p left knee arthrocentesis, keep NPO for now, and followup fluid studies/cultures.   US abdomen ordered given elevated LFTs/bili, GI consulted for further eval/?peg eval given failed S/S. Spoke with GI resident ([id number])> no urgent intervention at this time, would followup infectious workup for possible peg eval and trend LFTs/BILI, call back GI/hepatology if acute findings on US/worsening LFTs/Bili.   Case discussed with Jennifer Harmon >no intervention, check testosterone level.   Discussed plan of care with RN and attending, will monitor closely      15:00 -Patient's Parents Bridger/Deepthi Simeon at bedside ([place])> writer updated about patient's plan of care, all questions answered.

## 2022-12-12 NOTE — CONSULT NOTE ADULT - SUBJECTIVE AND OBJECTIVE BOX
52y Male presents with52-year-old male with a PMHx significant for TIAs (2011, 2013), CVAs x3 (02/2022 s/p tPA, 8/3/2022 s/p tPA, 11/5/2022 with residual expressive aphasia) on Eliquis, and HLD BIBEMS on 12/2/22 after being found unresponsive at home.  At baseline, patient is AOx4, able to ambulate without assistance, and takes care of all ADLs without assistance. On arrival, patient with rectal temp 105.4 F, tachy to 130s, hypertensive to 180/110, and tachypneic to 30C-collar was placed. Patient was intubated in the ED for airway protection. CTH with no acute findings, vEEG with no identified seizures. Patient was weaned off pressors, extubated, and transitioned to floors 12/4/22.12/5 RRT called for seizure like activity with urinary incontinence, tongue biting, and R>L posturing, patient s/p ativan 2mg x3. Anesthesia called to RRT for intubation and patient transferred to MICU. While in MICU, patient  was treated with vanc/rocephin for concern of meningitis X2 days, then transitioned to Zosyn for more likely ASP PNA event. Patient was extubated successfully on 12/8. Per Neuro, patient started on Vimpat because repeat vEEG on two AED with severe diffuse slowing indicates diffuse cerebral dysfunction of nonspecific etiology.     Patient is currently on a heparin gtt, no longer on antibiotics, last blood cultures NGF on 12/5 and pending MR brain for stroke evaluation.    Ortho consulted for concern for possible septic arthritis of the Left knee. Team noted worsening swelling of the knee over the past few days. Patient unable to participate in interview 2/2 mental status (AOx0) but able to state that he has pain in his left knee.        PAST MEDICAL & SURGICAL HISTORY:  History of TIAs      CVA (cerebrovascular accident)      HLD (hyperlipidemia)      Vertigo      Unresponsiveness      No significant past surgical history        Home Medications:  Adderall 15 mg oral tablet: 15 milligram(s) orally once a day (02 Dec 2022 23:10)  apixaban 5 mg oral tablet: 1 tab(s) orally every 12 hours restart 11/23 Am (02 Dec 2022 23:10)  aspirin 81 mg oral delayed release tablet: 1 tab(s) orally once a day (02 Dec 2022 23:10)  atorvastatin 40 mg oral tablet: 1 tab(s) orally once a day (02 Dec 2022 23:46)    Allergies    No Known Allergies    Intolerances                            8.2    28.88 )-----------( 289      ( 12 Dec 2022 06:05 )             24.7     12-12    143  |  108<H>  |  24<H>  ----------------------------<  137<H>  3.5   |  22  |  0.84    Ca    8.3<L>      12 Dec 2022 06:05  Phos  3.2     12-12  Mg     2.10     12-12    TPro  6.3  /  Alb  3.0<L>  /  TBili  2.3<H>  /  DBili  x   /  AST  31  /  ALT  46<H>  /  AlkPhos  99  12-12    PT/INR - ( 12 Dec 2022 06:05 )   PT: 15.6 sec;   INR: 1.34 ratio         PTT - ( 12 Dec 2022 06:05 )  PTT:52.8 sec    ESR 95        VITALS  Vital Signs Last 24 Hrs  T(C): 37.1 (12 Dec 2022 05:45), Max: 37.3 (11 Dec 2022 17:30)  T(F): 98.8 (12 Dec 2022 05:45), Max: 99.2 (11 Dec 2022 17:30)  HR: 108 (12 Dec 2022 05:45) (102 - 109)  BP: 121/72 (12 Dec 2022 05:45) (121/72 - 144/92)  BP(mean): --  RR: 19 (12 Dec 2022 05:45) (18 - 19)  SpO2: 96% (12 Dec 2022 05:45) (96% - 96%)    Parameters below as of 12 Dec 2022 05:45  Patient On (Oxygen Delivery Method): room air        PHYSICAL EXAM  General: NAD, Awake and Alert, resting comfortably  Resp: Non-labored breathing, No accessory muscle use  LLE:  Abrasion noted over superior aspect of the patella  Superficial wound noted over posterior aspect of the knee  No erythema  Knee globally swollen and warm to touch  Diffuse soft tissue TTP  No bony TTP  Minimal active ROM of Left knee (full spontaneous active ROM of Right knee)  Passive ROM 0-85 with pain  DP  pulses 2+  Warm        IMAGING:  XR Left knee reviewed: negative for fracture dislocation       52y Male presents with52-year-old male with a PMHx significant for TIAs (2011, 2013), CVAs x3 (02/2022 s/p tPA, 8/3/2022 s/p tPA, 11/5/2022 with residual expressive aphasia) on Eliquis, and HLD BIBEMS on 12/2/22 after being found unresponsive at home.  At baseline, patient is AOx4, able to ambulate without assistance, and takes care of all ADLs without assistance. On arrival, patient with rectal temp 105.4 F, tachy to 130s, hypertensive to 180/110, and tachypneic to 30C-collar was placed. Patient was intubated in the ED for airway protection. CTH with no acute findings, vEEG with no identified seizures. Patient was weaned off pressors, extubated, and transitioned to floors 12/4/22.12/5 RRT called for seizure like activity with urinary incontinence, tongue biting, and R>L posturing, patient s/p ativan 2mg x3. Anesthesia called to RRT for intubation and patient transferred to MICU. While in MICU, patient  was treated with vanc/rocephin for concern of meningitis X2 days, then transitioned to Zosyn for more likely ASP PNA event. Patient was extubated successfully on 12/8. Per Neuro, patient started on Vimpat because repeat vEEG on two AED with severe diffuse slowing indicates diffuse cerebral dysfunction of nonspecific etiology.     Patient is currently on a heparin gtt, no longer on antibiotics, last blood cultures NGF on 12/5 and pending MR brain for stroke evaluation.    Ortho consulted for concern for possible septic arthritis of the Left knee. Team noted worsening swelling of the knee over the past few days. Patient unable to participate in interview 2/2 mental status (AOx0) but able to state that he has pain in his left knee.        PAST MEDICAL & SURGICAL HISTORY:  History of TIAs      CVA (cerebrovascular accident)      HLD (hyperlipidemia)      Vertigo      Unresponsiveness      No significant past surgical history        Home Medications:  Adderall 15 mg oral tablet: 15 milligram(s) orally once a day (02 Dec 2022 23:10)  apixaban 5 mg oral tablet: 1 tab(s) orally every 12 hours restart 11/23 Am (02 Dec 2022 23:10)  aspirin 81 mg oral delayed release tablet: 1 tab(s) orally once a day (02 Dec 2022 23:10)  atorvastatin 40 mg oral tablet: 1 tab(s) orally once a day (02 Dec 2022 23:46)    Allergies    No Known Allergies    Intolerances                            8.2    28.88 )-----------( 289      ( 12 Dec 2022 06:05 )             24.7     12-12    143  |  108<H>  |  24<H>  ----------------------------<  137<H>  3.5   |  22  |  0.84    Ca    8.3<L>      12 Dec 2022 06:05  Phos  3.2     12-12  Mg     2.10     12-12    TPro  6.3  /  Alb  3.0<L>  /  TBili  2.3<H>  /  DBili  x   /  AST  31  /  ALT  46<H>  /  AlkPhos  99  12-12    PT/INR - ( 12 Dec 2022 06:05 )   PT: 15.6 sec;   INR: 1.34 ratio         PTT - ( 12 Dec 2022 06:05 )  PTT:52.8 sec    ESR 95        VITALS  Vital Signs Last 24 Hrs  T(C): 37.1 (12 Dec 2022 05:45), Max: 37.3 (11 Dec 2022 17:30)  T(F): 98.8 (12 Dec 2022 05:45), Max: 99.2 (11 Dec 2022 17:30)  HR: 108 (12 Dec 2022 05:45) (102 - 109)  BP: 121/72 (12 Dec 2022 05:45) (121/72 - 144/92)  BP(mean): --  RR: 19 (12 Dec 2022 05:45) (18 - 19)  SpO2: 96% (12 Dec 2022 05:45) (96% - 96%)    Parameters below as of 12 Dec 2022 05:45  Patient On (Oxygen Delivery Method): room air        PHYSICAL EXAM  General: NAD, Awake and Alert, resting comfortably  Resp: Non-labored breathing, No accessory muscle use  LLE:  Abrasion noted over superior aspect of the patella  Superficial wound noted over posterior aspect of the knee  No erythema  Knee globally swollen and warm to touch  Diffuse soft tissue TTP  No bony TTP  Minimal active ROM of Left knee (full spontaneous active ROM of Right knee)  Passive ROM 0-85 with pain  DP  pulses 2+  Warm        IMAGING:  XR Left knee reviewed: negative for fracture dislocation      Procedure:   Under aspetic conditions, 30 cc of bloody fluid was removed from the affected joint. This fluid was distributed to a sterile cup for gram stain and cell culture, a lavender top laboratory tube for cell count, and a red top laboratory tube for crystal analysis. The patient tolerated the procedure well and there were no complications. Patient was neurovascularly intact after the procedure.

## 2022-12-12 NOTE — PROGRESS NOTE ADULT - ASSESSMENT
52-year-old male with a PMHx significant for TIAs (2011, 2013), CVAs x3 (02/2022 s/p tPA, 8/3/2022 s/p tPA, 11/5/2022 with residual expressive aphasia) on Eliquis, and HLD BIBEMS after being found unresponsive at home with possible aspiration pna, s/p micu stay, leukocytosis, knee hemarthrosis, fever    Wong Ramírez  Attending Physician   Division of Infectious Disease  Office #[phone]  Available on Microsoft Teams also  After 5pm/weekend or no response, call #[phone]

## 2022-12-12 NOTE — PROGRESS NOTE ADULT - SUBJECTIVE AND OBJECTIVE BOX
Neurology Progress Note    S: Patient seen and examined , now on floor agitated at times     Medication:    MEDICATIONS  (STANDING):  aspirin  chewable 81 milliGRAM(s) Oral daily  bisacodyl Suppository 10 milliGRAM(s) Rectal daily  chlorhexidine 2% Cloths 1 Application(s) Topical daily  dextrose 5% + sodium chloride 0.9%. 1000 milliLiter(s) (50 mL/Hr) IV Continuous <Continuous>  dextrose 50% Injectable 25 Gram(s) IV Push once  dextrose 50% Injectable 12.5 Gram(s) IV Push once  dextrose 50% Injectable 25 Gram(s) IV Push once  dextrose Oral Gel 15 Gram(s) Oral once  diphtheria/tetanus/pertussis (acellular) Vaccine (Adacel) 0.5 milliLiter(s) IntraMuscular once  glucagon  Injectable 1 milliGRAM(s) IntraMuscular once  heparin  Infusion. 1300 Unit(s)/Hr (13 mL/Hr) IV Continuous <Continuous>  lacosamide IVPB 100 milliGRAM(s) IV Intermittent every 12 hours  levETIRAcetam  IVPB 1500 milliGRAM(s) IV Intermittent every 12 hours    MEDICATIONS  (PRN):  diphenhydrAMINE Injectable 12.5 milliGRAM(s) IV Push once PRN Insomnia  heparin   Injectable 8000 Unit(s) IV Push every 6 hours PRN For aPTT less than 40  heparin   Injectable 4000 Unit(s) IV Push every 6 hours PRN For aPTT between 40 - 57    Vitals:  Vital Signs Last 24 Hrs  T(C): 37.1 (12-12-22 @ 05:45), Max: 37.3 (12-11-22 @ 17:30)  T(F): 98.8 (12-12-22 @ 05:45), Max: 99.2 (12-11-22 @ 17:30)  HR: 108 (12-12-22 @ 05:45) (102 - 109)  BP: 121/72 (12-12-22 @ 05:45) (121/72 - 144/92)  BP(mean): --  RR: 19 (12-12-22 @ 05:45) (18 - 19)  SpO2: 96% (12-12-22 @ 05:45) (96% - 96%)          General Exam:   General Appearance: Appropriately dressed and in no acute distress       Head: Normocephalic, atraumatic and no dysmorphic features  Ear, Nose, and Throat: Moist mucous membranes    CVS: S1S2+  Resp: No SOB, no wheeze or rhonchi  Abd: soft NTND  Extremities: No edema, no cyanosis  Skin: No bruises, no rashes     Neurological Exam:    Mental Status: Awake, Oriented x 1-2, followign some simple.  speaking more but slurred.    Cranial Nerves: PERRL, EOMI, VFFC, sensation V1-V3 intact,  no obvious facial asymmetry    Motor: Moving uppers > lowers. uppers at least 3-4/5 in mittens ; not moving LLE very well   Sensation:minimal withdrawal to noxious x 4  R>L   Reflexes: 1+ throughout at biceps, brachioradialis, triceps, patellars and ankles bilaterally and equal. No clonus. R toe and L toe were both downgoing.  Coordination: unable   Gait: unable     I personally reviewed the below data/images/labs:  CBC Full  -  ( 12 Dec 2022 06:05 )  WBC Count : 28.88 K/uL  RBC Count : 2.76 M/uL  Hemoglobin : 8.2 g/dL  Hematocrit : 24.7 %  Platelet Count - Automated : 289 K/uL  Mean Cell Volume : 89.5 fL  Mean Cell Hemoglobin : 29.7 pg  Mean Cell Hemoglobin Concentration : 33.2 gm/dL  Auto Neutrophil # : x  Auto Lymphocyte # : x  Auto Monocyte # : x  Auto Eosinophil # : x  Auto Basophil # : x  Auto Neutrophil % : x  Auto Lymphocyte % : x  Auto Monocyte % : x  Auto Eosinophil % : x  Auto Basophil % : x    12-12    143  |  108<H>  |  24<H>  ----------------------------<  137<H>  3.5   |  22  |  0.84    Ca    8.3<L>      12 Dec 2022 06:05  Phos  3.2     12-12  Mg     2.10     12-12    TPro  6.3  /  Alb  3.0<L>  /  TBili  2.3<H>  /  DBili  x   /  AST  31  /  ALT  46<H>  /  AlkPhos  99  12-12        < from: CT Head No Cont (12.02.22 @ 20:27) >    ACC: [id number] EXAM:  CT CERVICAL SPINE                        ACC: [id number] EXAM:  CT MAXILLOFACIAL                        ACC: [id number] EXAM:  CT BRAIN                          PROCEDURE DATE:  12/02/2022        INTERPRETATION:  CLINICAL INDICATION: Trauma.    Technique: Noncontrast axial CT of the head, facial bones, and cervical   spine was performed. 3-D reformats of the facial bones was obtained.   Coronal and sagittal reformats were obtained.      COMPARISON: None.    FINDINGS:  Head CT:  The ventricles and sulci are within normal limits. 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 There is no intraparenchymal hematoma, mass effect   or midline shift. No abnormal extra-axial fluid collections or   hemorrhages are present.    There is swelling of the left lateral scalp. The calvarium is intact.   There are scattered mucosal inflammatory changes in the paranasal sinuses.      Cervical spine CT:  Alignment ismaintained. Vertebral bodies are normal in height, without   evidence of fracture or dislocation. Prevertebral soft tissues are within   normal limits without soft tissue swelling or hematoma.    Intervertebral discs are intact. Neural foramina and spinal canal are   intact.    The visualized lung apices are within normal limits.      Facial bone CT:    No acute facial fracture.    There are scattered mucosal inflammatory changes in the paranasal   sinuses. Mastoid air cells are clear.    Soft tissues appear unremarkable.        IMPRESSION:  CT HEAD: No acute abnormality.  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  CT CERVICAL SPINE: No acute abnormality  CT FACIAL BONE: No acute abnormality    --- End of Report ---       DELGADO IVAN MD; Attending Radiologist  This document has been electronically signed. Dec  2 2022  9:02PM    < end of copied text >  < from: CT Lumbar Spine No Cont (12.02.22 @ 20:27) >    ACC: [id number] EXAM:  CT LUMBAR SPINE                        ACC: [id number] EXAM:  CT THORACIC SPINE                          PROCEDURE DATE:  12/02/2022          INTERPRETATION:  CT thoracic and lumbar spine without IV contrast    CLINICAL INFORMATION:  Trauma  Back pain, fracture.    TECHNIQUE:  Contiguous axial 2 mm sections were obtained through the   thoracic and lumbar spine using a single helical acquisition.     Additional 2 mm sagittal and coronal reconstructions of the spine were   obtained. These additional reformatted images were used to evaluate the   spine for alignment, vertebral fractures and the integrity of the the   posterior elements.   This scan was performed using automatic exposure   control (radiation dose reduction software) to obtain a diagnostic image   quality scan with patient dose as low as reasonably achievable.    FINDINGS:   No prior similar studies are available for review    Thoracic and lumbar vertebral body heights are maintained. No vertebral   fracture is seen. No destructive bone lesion is found.  Alignment is   preserved.  Facet joints appear intact and aligned.    Thoracic and lumbar intervertebral disc spaces appear intact.   Degenerative disc disease and spondylosis is noted at T6-T7 throughL4-5   with loss of disc height and associated degenerative endplate changes.   Mild disc bulges at L2-3 through L4-5 flatten the ventral thecal sac and   narrow the BILATERAL neural foramina.    No paraspinal mass is recognized.  Paraspinal soft tissues appear intact.      IMPRESSION:  Degenerative disc disease and spondylosis is noted at T6-T7   through L4-5 with loss of disc height and associated degenerative   endplate changes. Mild disc bulges at L2-3 through L4-5 flatten the   ventral thecal sac and narrow the BILATERAL neural foramina   No   vertebral fracture is recognized.    --- End of Report ---       ANGIE ESCOBAR MD; Attending Radiologist  This document has been electronically signed. Dec  2 2022  8:55PM    < end of copied text >    EEG Classification / Summary:  Abnormal EEG in a comatose patient due to diffuse suppression gradually improving to discontinuous background, with right hemispheric relative attenuation/suppression. No epileptiform abnormalities or seizures are captured.    Clinical Impression:  Severe diffuse cerebral dysfunction that gradually improves is nonspecific in etiology. In this case, it may be related to sedating medication (propofol) with improvement after decreasing and stopping the medication.  Right hemispheric focal cerebral dysfunction can be structural or functional in etiology.      12/7  Clinical Impression:  -Occasional runs of right frontal lateralized periodic discharges (LPDs) at up to 1.3 Hz indicate a potentially epileptogenic focus in the right frontal region and are on the ictal-interictal continuum.  -A pattern on the ictal-interictal continuum is a pattern that does not qualify as an electrographic seizure or electrographic status epilepticus, but there is a reasonable chance that it may be contributing to impaired alertness, causing other clinical symptoms, and/or contributing to neuronal injury.  -Right hemispheric relative attenuation indicates right hemispheric focal cerebral dysfunction, which can be structural or functional in etiology.  -Rare left frontal epileptiform discharges indicate a potentially epileptogenic focus in this reigon  -Severe diffuse slowing and GRDA indicate severe diffuse cerebral dysfunction of nonspecific etiology.  -No electrographic seizures are captured.     < from: MR Head w/wo IV Cont (12.09.22 @ 19:54) >    ACC: [id number] EXAM:  MR BRAIN WAW IC                          PROCEDURE DATE:  12/09/2022          INTERPRETATION:  CLINICAL INDICATION: CVA, found unresponsive at home      Magnetic resonance imaging of the brain was carried out with transaxial   SPGR, FLAIR, fast spin echo T2 weighted images, axial susceptibility   weighted series, diffusion weighted series and sagittal T1 weighted   series on a 1.5 Maritza magnet. Post contrast axial, coronal and sagittal   T1 weighted images were obtained. 10cc of Gadavist were intravenously   injected, 0 cc were discarded.      Comparison is made with the prior brain CT of 12/5/2022 and MRI 11/5/2022.    Mild ventricular and sulcal prominence is consistent with moderate   atrophy for the patient's age. No acute hemorrhage is identified. There   is a focus of hemosiderin within the alexis which is calcified on CT.   Scattered additional foci of hemosiderin deposition are identified in the   left frontal subcortical white matter and right occipital cortex is   nonspecific but may be related to multiple cavernoma is or hypertension.    There is a tiny focus of diffusion restriction in the right frontal   subcortical white matter and right centrum semiovale which are unchanged   since the prior exam and may represent subacute infarcts. Multiplicity   infarcts may be related to hypercoagulable state or cardioembolic events.    After contrast administration there is normal intracranial vascular   enhancement. No abnormal parenchymal or leptomeningeal enhancement.      IMPRESSION: Atrophy for the patient's age. Right frontal subcortical and   right centrum semiovale punctate infarcts are unchanged since 11/5/2022   and likely represent subacute infarcts. No abnormal enhancement. Focus of   calcification in the alexis with old hemosiderin. A few additional   scattered foci of hemosiderin deposition are unchanged.    --- End of Report ---            JUAN MANUEL CASTILLO MD; Attending Radiologist  This document has been electronically signed. Dec  9 2022  8:05PM    < end of copied text >

## 2022-12-12 NOTE — PROGRESS NOTE ADULT - ASSESSMENT
52-year-old  M known to me from outpatient setting with  TIAs (2011, 2013), Strokes x3 (02/2022 s/p tPA, 8/3/2022 s/p tPA, 11/5/2022 with residual expressive aphasia) on Eliquis, was on testosterone outpatient stopped after last stroke, HLD BIBEMS after being found unresponsive  Temp 105.4 on arrival tachy and /110. 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   CT cervical spine and maxillofacial scans negative.  CT chest, abdomen, and pelvis w/ contrast concerning for possible partial SBO without transition point. Surgery consulted in ED, no acute surgical intervention at this time.   CT thoracic and lumbar spine showing degenerative disc disease T6-T7 through L4-L5 and mild disc bulges at L2-L3 through L4-L5 that flatten the ventral thecal sac and narrowing of the bilateral neural foramina.  MRI 11/2022: R centrum semiovale and R posterior frontal infarcts   takes adderall at home   + rhabdo  EEG no seizures   + transaminitis   CTH 12/5 stable   outpatient hypercoag workup neg   12/5 extubated then reintibuated for seizure activity and tx back to ICU   EEG this AM 12/5 with only slowing   EEG 12/6 slowing but no seizures   spoke with Winter Conti (friend) who states after he was taken off the testosterone he ordered a mail order testosterone supplement, unclear if he started it yet, unclear what this was  12/7 EEG no electrogtraphic seizures captured but R LPDs, rare L frontal discharges, severe GRDA.   12/8 EEG improved.  extubated. following some commands   12/9 moving uppers> lowers   12/11 AAOx2 uppers 4-5/5; not moving LLE well   MRI brain neg for new stroke     Impression:   1) AMS of unclear etiology, possible now seizure, related to adderral withdrawal? 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 had MIMI was question of PFO but not found. s/p ILR     - ortho for L knee   - started  Vimpat 100mg BID    - keppra 1500mg BID.   - fever on arrival, treatred initially for meningitis.  was on abx for aspiration PNA.  would consider LP at this point ; unclear etiology of initial fever   - monitor LFTs , downtrending   - IVF  - CPK elevated. trend   - would consider endocrine   - there was some concern outpatient he may have a mixed  connective tissue disorder   - statin therapy for secondary stroke prevention when LFTS and CPK improve   - on heparin drip    - telemetry  - PT/OT/SS/SLP, OOBC  - check FS, glucose control <180  - GI/DVT ppx  - Thank you for allowing me to participate in the care of this patient. Call with questions.   - spoke with Winter Conti at [phone] for more collateral info and to provide update.   - spoke with friend at bedside   Braxton Forde MD  Vascular Neurology  Office: [phone]    52-year-old  M known to me from outpatient setting with  TIAs (2011, 2013), Strokes x3 (02/2022 s/p tPA, 8/3/2022 s/p tPA, 11/5/2022 with residual expressive aphasia) on Eliquis, was on testosterone outpatient stopped after last stroke, HLD BIBEMS after being found unresponsive  Temp 105.4 on arrival tachy and /110. 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   CT cervical spine and maxillofacial scans negative.  CT chest, abdomen, and pelvis w/ contrast concerning for possible partial SBO without transition point. Surgery consulted in ED, no acute surgical intervention at this time.   CT thoracic and lumbar spine showing degenerative disc disease T6-T7 through L4-L5 and mild disc bulges at L2-L3 through L4-L5 that flatten the ventral thecal sac and narrowing of the bilateral neural foramina.  MRI 11/2022: R centrum semiovale and R posterior frontal infarcts   takes adderall at home   + rhabdo  EEG no seizures   + transaminitis   CTH 12/5 stable   outpatient hypercoag workup neg   12/5 extubated then reintibuated for seizure activity and tx back to ICU   EEG this AM 12/5 with only slowing   EEG 12/6 slowing but no seizures   spoke with Winter Conti (friend) who states after he was taken off the testosterone he ordered a mail order testosterone supplement, unclear if he started it yet, unclear what this was  12/7 EEG no electrogtraphic seizures captured but R LPDs, rare L frontal discharges, severe GRDA.   12/8 EEG improved.  extubated. following some commands   12/9 moving uppers> lowers   12/11 AAOx2 uppers 4-5/5; not moving LLE well   MRI brain neg for new stroke     Impression:   1) AMS of unclear etiology, possible now seizure, related to adderral withdrawal? 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 had MIMI was question of PFO but not found. s/p ILR     - ortho for L knee   - started  Vimpat 100mg BID    - keppra 1500mg BID.   - fever on arrival, treatred initially for meningitis.  was on abx for aspiration PNA.  would consider LP at this point ; unclear etiology of initial fever   - monitor LFTs , downtrending   - IVF  - CPK elevated. trend   - would consider endocrine   - there was some concern outpatient he may have a mixed  connective tissue disorder   - statin therapy for secondary stroke prevention when LFTS and CPK improve   - on heparin drip    - telemetry  - PT/OT/SS/SLP, OOBC  - check FS, glucose control <180  - GI/DVT ppx  - Thank you for allowing me to participate in the care of this patient. Call with questions.   - spoke with Winter Conti at [phone] for more collateral info and to provide update. spoke again 12/12 over phone.   - spoke with friend at bedside   Braxton Forde MD  Vascular Neurology  Office: [phone]    52-year-old  M known to me from outpatient setting with  TIAs (2011, 2013), Strokes x3 (02/2022 s/p tPA, 8/3/2022 s/p tPA, 11/5/2022 with residual expressive aphasia) on Eliquis, was on testosterone outpatient stopped after last stroke, HLD BIBEMS after being found unresponsive  Temp 105.4 on arrival tachy and /110. 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   CT cervical spine and maxillofacial scans negative.  CT chest, abdomen, and pelvis w/ contrast concerning for possible partial SBO without transition point. Surgery consulted in ED, no acute surgical intervention at this time.   CT thoracic and lumbar spine showing degenerative disc disease T6-T7 through L4-L5 and mild disc bulges at L2-L3 through L4-L5 that flatten the ventral thecal sac and narrowing of the bilateral neural foramina.  MRI 11/2022: R centrum semiovale and R posterior frontal infarcts   takes adderall at home   + rhabdo  EEG no seizures   + transaminitis   CTH 12/5 stable   outpatient hypercoag workup neg   12/5 extubated then reintibuated for seizure activity and tx back to ICU   EEG this AM 12/5 with only slowing   EEG 12/6 slowing but no seizures   spoke with Pippa Conti (friend) who states after he was taken off the testosterone he ordered a mail order testosterone supplement, unclear if he started it yet, unclear what this was  12/7 EEG no electrogtraphic seizures captured but R LPDs, rare L frontal discharges, severe GRDA.   12/8 EEG improved.  extubated. following some commands   12/9 moving uppers> lowers   12/11 AAOx2 uppers 4-5/5; not moving LLE well   MRI brain neg for new stroke   spoke with friend pippa conti - she counted his adderrall and didn't take extra tables. did find ashwagandha and red wood (bottle was sealed) supplement in his apartment   Impression:   1) AMS of unclear etiology, possible now seizure, related to adderral withdrawal? 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 had MIMI was question of PFO but not found. s/p ILR     - ortho for L knee   - started  Vimpat 100mg BID    - keppra 1500mg BID.   - fever on arrival, treatred initially for meningitis.  was on abx for aspiration PNA.  would consider LP at this point ; unclear etiology of initial fever   - monitor LFTs , downtrending   - IVF  - CPK elevated. trend   - would consider endocrine   - there was some concern outpatient he may have a mixed  connective tissue disorder   - statin therapy for secondary stroke prevention when LFTS and CPK improve   - on heparin drip    - telemetry  - PT/OT/SS/SLP, OOBC  - check FS, glucose control <180  - GI/DVT ppx  - Thank you for allowing me to participate in the care of this patient. Call with questions.   - spoke with Pippa Conti at [phone] for more collateral info and to provide update. spoke again 12/12 over phone.   - spoke with friend at bedside, [place] 12/11   Braxton Forde MD  Vascular Neurology  Office: [phone]

## 2022-12-12 NOTE — CHART NOTE - NSCHARTNOTEFT_GEN_A_CORE
Pt seen for follow up/ NPO>3days    Medical Course: 52-year-old male with a PMHx significant for TIAs (2011, 2013), CVAs x3 (02/2022 s/p tPA, 8/3/2022 s/p tPA, 11/5/2022 with residual expressive aphasia) on Eliquis, and HLD BIBEMS after being found unresponsive at home on the floor, last known well 12/1 at around noon. C-collar was placed. Patient was intubated in the ED for airway protection. CTH with no acute findings, vEEG with no identified seizures. Patient was weaned off pressors, extubated, and transitioned to floors 12/4/22. 12/5 am RRT called for seizure like activity with urinary incontinence, tongue biting, and R>L posturing, patient s/p ativan 2mg x3. Anesthesia called to RRT for intubation. MICU accepting patient for seizure like activity requiring intubation.    Nutrition Course: Pt has been NPO x5 days. Swallow assessment completed today 12/12 with recommendations for NPO/short term alternate means of nutrition.     Diet Prescription: Diet, NPO (12-12-22 @ 09:12)    Pertinent Medications: MEDICATIONS  (STANDING):  aspirin  chewable 81 milliGRAM(s) Oral daily  bisacodyl Suppository 10 milliGRAM(s) Rectal daily  chlorhexidine 2% Cloths 1 Application(s) Topical daily  dextrose 5% + sodium chloride 0.9%. 1000 milliLiter(s) (50 mL/Hr) IV Continuous <Continuous>  dextrose 50% Injectable 25 Gram(s) IV Push once  dextrose 50% Injectable 12.5 Gram(s) IV Push once  dextrose 50% Injectable 25 Gram(s) IV Push once  dextrose Oral Gel 15 Gram(s) Oral once  diphtheria/tetanus/pertussis (acellular) Vaccine (Adacel) 0.5 milliLiter(s) IntraMuscular once  glucagon  Injectable 1 milliGRAM(s) IntraMuscular once  lacosamide IVPB 100 milliGRAM(s) IV Intermittent every 12 hours  levETIRAcetam  IVPB 1500 milliGRAM(s) IV Intermittent every 12 hours    MEDICATIONS  (PRN):  diphenhydrAMINE Injectable 12.5 milliGRAM(s) IV Push once PRN Insomnia  heparin   Injectable 8000 Unit(s) IV Push every 6 hours PRN For aPTT less than 40  heparin   Injectable 4000 Unit(s) IV Push every 6 hours PRN For aPTT between 40 - 57    Pertinent Labs: 12-12 Na143 mmol/L Glu 137 mg/dL<H> K+ 3.5 mmol/L Cr  0.84 mg/dL BUN 24 mg/dL<H> 12-12 Phos 3.2 mg/dL 12-12 Alb 3.0 g/dL<L>     CAPILLARY BLOOD GLUCOSE      POCT Blood Glucose.: 143 mg/dL (12 Dec 2022 13:10)  POCT Blood Glucose.: 141 mg/dL (12 Dec 2022 05:58)  POCT Blood Glucose.: 131 mg/dL (12 Dec 2022 00:23)  POCT Blood Glucose.: 143 mg/dL (11 Dec 2022 17:30)      Weight: Height (cm): 190.5 (12-02 @ 19:44), 190.5 (11-22 @ 08:56), 190.5 (11-04 @ 16:01), 190.5 (09-15 @ 18:25), 190.5 (09-13 @ 08:01)  Weight (kg): 95.7 (12-03 @ 00:25), 95.3 (11-22 @ 08:56), 95.3 (11-04 @ 16:01), 95.3 (09-15 @ 18:25), 95.3 (09-13 @ 08:01)  BMI (kg/m2): 26.4 (12-03 @ 00:25), 26.3 (12-02 @ 19:44), 26.3 (11-22 @ 08:56), 26.3 (11-04 @ 16:01), 26.3 (09-15 @ 18:25), 26.3 (09-13 @ 08:01)  Weight Assessment:      Physical Assessment, per flowsheets:  Edema:  Skin:      Estimated Needs:   [X] No change since previous assessment, based on dosing weight  lbs / kg   kcal daily @ kcal/kg,  gm protein daily @ gm/kg   [ ] recalculated, with consideration for, based on weight    Previous Nutrition Diagnosis:   [ ] Inadequate Energy Intake [ ]Inadequate Oral Intake [ ] Excessive Energy Intake   [ ] Underweight [ ] Increased Nutrient Needs [ ] Overweight/Obesity   [ ] Altered GI Function [ ] Unintended Weight Loss [ ] Food & Nutrition Related Knowledge Deficit [ ] Malnutrition   Nutrition Diagnosis is [ ] ongoing  [ ] resolved [ ] not applicable   New Nutrition Diagnosis: [ ] not applicable     Interventions:   1)   2)  3)     Monitor & Evaluate:  PO intake, tolerance to diet/supplement, nutrition related lab values, weight trends, BMs/GI distress, hydration status, skin integrity.    Tati Leonard MS, RD| [id number] (Also available on TEAMS) Pt seen for follow up/ NPO>3days    Medical Course: 52-year-old male with a PMHx significant for TIAs (2011, 2013), CVAs x3 (02/2022 s/p tPA, 8/3/2022 s/p tPA, 11/5/2022 with residual expressive aphasia) on Eliquis, and HLD BIBEMS after being found unresponsive at home on the floor, last known well 12/1 at around noon. C-collar was placed. Patient was intubated in the ED for airway protection. CTH with no acute findings, vEEG with no identified seizures. Patient was weaned off pressors, extubated, and transitioned to floors 12/4/22. 12/5 am RRT called for seizure like activity with urinary incontinence, tongue biting, and R>L posturing, patient s/p ativan 2mg x3. Anesthesia called to RRT for intubation. MICU accepting patient for seizure like activity requiring intubation.    Nutrition Course: Pt A&Ox1. Pt has been NPO x5 days. Discussion with RN, Swallow assessment completed today 12/12 with recommendations for NPO/short term alternate means of nutrition. Currently receiving IVF for hydration. If within GOC and alternate means of nutrition decided will recommend Jevity 1.5@ 70ml/hr x24hrs to provide 1680ml total formula, 2520kcal (26kcal/kg), 107g pro (1.1g/kg), 1276ml free water. No reported nausea, vomiting, diarrhea, or constipation. Last BM 12/11 per RN flowsheets. Electrolytes repleted prn. Unable to conduct physcial assessment at this time to assess for muscle/fat wasting.    Diet Prescription: Diet, NPO (12-12-22 @ 09:12)    Pertinent Medications: MEDICATIONS  (STANDING):  aspirin  chewable 81 milliGRAM(s) Oral daily  bisacodyl Suppository 10 milliGRAM(s) Rectal daily  chlorhexidine 2% Cloths 1 Application(s) Topical daily  dextrose 5% + sodium chloride 0.9%. 1000 milliLiter(s) (50 mL/Hr) IV Continuous <Continuous>  dextrose 50% Injectable 25 Gram(s) IV Push once  dextrose 50% Injectable 12.5 Gram(s) IV Push once  dextrose 50% Injectable 25 Gram(s) IV Push once  dextrose Oral Gel 15 Gram(s) Oral once  diphtheria/tetanus/pertussis (acellular) Vaccine (Adacel) 0.5 milliLiter(s) IntraMuscular once  glucagon  Injectable 1 milliGRAM(s) IntraMuscular once  lacosamide IVPB 100 milliGRAM(s) IV Intermittent every 12 hours  levETIRAcetam  IVPB 1500 milliGRAM(s) IV Intermittent every 12 hours    MEDICATIONS  (PRN):  diphenhydrAMINE Injectable 12.5 milliGRAM(s) IV Push once PRN Insomnia  heparin   Injectable 8000 Unit(s) IV Push every 6 hours PRN For aPTT less than 40  heparin   Injectable 4000 Unit(s) IV Push every 6 hours PRN For aPTT between 40 - 57    Pertinent Labs: 12-12 Na143 mmol/L Glu 137 mg/dL<H> K+ 3.5 mmol/L Cr  0.84 mg/dL BUN 24 mg/dL<H> 12-12 Phos 3.2 mg/dL 12-12 Alb 3.0 g/dL<L>     CAPILLARY BLOOD GLUCOSE  POCT Blood Glucose.: 143 mg/dL (12 Dec 2022 13:10)  POCT Blood Glucose.: 141 mg/dL (12 Dec 2022 05:58)  POCT Blood Glucose.: 131 mg/dL (12 Dec 2022 00:23)  POCT Blood Glucose.: 143 mg/dL (11 Dec 2022 17:30)      Weight: Height (cm): 190.5 (12-02 @ 19:44), 190.5 (11-22 @ 08:56), 190.5 (11-04 @ 16:01), 190.5 (09-15 @ 18:25), 190.5 (09-13 @ 08:01)  Weight (kg): 95.7 (12-03 @ 00:25), 95.3 (11-22 @ 08:56), 95.3 (11-04 @ 16:01), 95.3 (09-15 @ 18:25), 95.3 (09-13 @ 08:01)  BMI (kg/m2): 26.4 (12-03 @ 00:25), 26.3 (12-02 @ 19:44), 26.3 (11-22 @ 08:56), 26.3 (11-04 @ 16:01), 26.3 (09-15 @ 18:25), 26.3 (09-13 @ 08:01)  Weight Assessment: 12/12: 97.1kg. (weight trend stable)      Physical Assessment, per flowsheets:  Edema: No edema noted per RN flowsheets   Skin: Intact w/ no pressure injuries noted per RN flowsheets       Estimated Needs:   [ ] recalculated based on current weight 97.1kg  25-30kcal/kg (4315-8464 kcal/kg)  .8-1.0g/kg (77-97g/kg)    Previous Nutrition Diagnosis: [ x] Inadequate Energy Intake  Nutrition Diagnosis is [x ] ongoing   New Nutrition Diagnosis: [x ] not applicable     Interventions:   1) If medically feasible and within GOC recommend Jevity 1.5@ 70ml/hr x24hrs to provide  2) Obtain weekly weight  3) Replete electrolytes prn    Monitor & Evaluate:  Tolerance to diet or TF, nutrition related lab values, weight trends, BMs/GI distress, hydration status, skin integrity.    Tati Leonard MS, RD| [id number] (Also available on TEAMS)

## 2022-12-12 NOTE — CONSULT NOTE ADULT - ASSESSMENT
ASSESSMENT & PLAN:  52y Male with complex past medical history and hospital course, admitted with AMS still unresolved with unclear etiology (pending brain MRI and LP). Ortho consulted for possible septic arthritis of the left knee as a potential source. Patient has elevated ESR/CRP with pain limited ROM. Knee arthrocentesis performed: 30cc of bloody fluid removed. Presentation likely secondary to hemarthrosis in the setting of current anticoagulation (heparin gtt).     - Pain control as needed  - Ace Wrap  - WBAT LLE  - Follow-up Synovial fluid: GS/Culture/CC/Crystals  - Recommend repeat blood cultures  - Keep patient NPO until cell count results  - Ortho following  - Discussed with attending surgeon on call    Orthopaedic Surgery  [facility] [place] [id number]  [facility]        [id number]  [facility]  [id number]/ [phone]     ASSESSMENT & PLAN:  52y Male with complex past medical history and hospital course, admitted with AMS still unresolved with unclear etiology (pending brain MRI and LP). Ortho consulted for possible septic arthritis of the left knee as a potential source. Patient has elevated ESR/CRP with pain limited ROM. Knee arthrocentesis performed: 30cc of bloody fluid removed. Presentation likely secondary to hemarthrosis in the setting of current anticoagulation (heparin gtt).     - Pain control as needed  - Ace Wrap  - WBAT LLE  - Follow-up Synovial fluid: GS/Culture/CC/Crystals  - Recommend repeat blood cultures  - Keep patient NPO until cell count results, if <50K may resume diet  - Ortho following  - Discussed with attending surgeon on call    Orthopaedic Surgery  [facility] [id number]  [facility]        [id number]  [facility]  [id number]/ [phone]

## 2022-12-12 NOTE — SWALLOW BEDSIDE ASSESSMENT ADULT - PHARYNGEAL PHASE
Cough post oral intake Wet vocal quality post oral intake/Multiple swallows Wet vocal quality post oral intake/Throat clear post oral intake

## 2022-12-12 NOTE — PROGRESS NOTE ADULT - SUBJECTIVE AND OBJECTIVE BOX
TAMI DUNHAM 52y [id number]    Patient is a 52y old  Male who presents with a chief complaint of Unresponsive (12 Dec 2022 11:24)      Follow Up/CC:  ID following for    Interval History/ROS:    Allergies    No Known Allergies    Intolerances        ANTIMICROBIALS:      MEDICATIONS  (STANDING):  aspirin  chewable 81 milliGRAM(s) Oral daily  bisacodyl Suppository 10 milliGRAM(s) Rectal daily  chlorhexidine 2% Cloths 1 Application(s) Topical daily  dextrose 5% + sodium chloride 0.9%. 1000 milliLiter(s) (50 mL/Hr) IV Continuous <Continuous>  dextrose 50% Injectable 25 Gram(s) IV Push once  dextrose 50% Injectable 12.5 Gram(s) IV Push once  dextrose 50% Injectable 25 Gram(s) IV Push once  dextrose Oral Gel 15 Gram(s) Oral once  diphtheria/tetanus/pertussis (acellular) Vaccine (Adacel) 0.5 milliLiter(s) IntraMuscular once  glucagon  Injectable 1 milliGRAM(s) IntraMuscular once  heparin  Infusion. 1300 Unit(s)/Hr (13 mL/Hr) IV Continuous <Continuous>  lacosamide IVPB 100 milliGRAM(s) IV Intermittent every 12 hours  levETIRAcetam  IVPB 1500 milliGRAM(s) IV Intermittent every 12 hours  potassium chloride  10 mEq/100 mL IVPB 10 milliEquivalent(s) IV Intermittent every 1 hour    MEDICATIONS  (PRN):  diphenhydrAMINE Injectable 12.5 milliGRAM(s) IV Push once PRN Insomnia  heparin   Injectable 8000 Unit(s) IV Push every 6 hours PRN For aPTT less than 40  heparin   Injectable 4000 Unit(s) IV Push every 6 hours PRN For aPTT between 40 - 57        Vital Signs Last 24 Hrs  T(C): 37.1 (12 Dec 2022 05:45), Max: 37.3 (11 Dec 2022 17:30)  T(F): 98.8 (12 Dec 2022 05:45), Max: 99.2 (11 Dec 2022 17:30)  HR: 108 (12 Dec 2022 05:45) (102 - 109)  BP: 121/72 (12 Dec 2022 05:45) (121/72 - 144/92)  BP(mean): --  RR: 19 (12 Dec 2022 05:45) (18 - 19)  SpO2: 96% (12 Dec 2022 05:45) (96% - 96%)    Parameters below as of 12 Dec 2022 05:45  Patient On (Oxygen Delivery Method): room air        CBC Full  -  ( 12 Dec 2022 06:05 )  WBC Count : 28.88 K/uL  RBC Count : 2.76 M/uL  Hemoglobin : 8.2 g/dL  Hematocrit : 24.7 %  Platelet Count - Automated : 289 K/uL  Mean Cell Volume : 89.5 fL  Mean Cell Hemoglobin : 29.7 pg  Mean Cell Hemoglobin Concentration : 33.2 gm/dL  Auto Neutrophil # : x  Auto Lymphocyte # : x  Auto Monocyte # : x  Auto Eosinophil # : x  Auto Basophil # : x  Auto Neutrophil % : x  Auto Lymphocyte % : x  Auto Monocyte % : x  Auto Eosinophil % : x  Auto Basophil % : x    12-12    143  |  108<H>  |  24<H>  ----------------------------<  137<H>  3.5   |  22  |  0.84    Ca    8.3<L>      12 Dec 2022 06:05  Phos  3.2     12-12  Mg     2.10     12-12    TPro  6.3  /  Alb  3.0<L>  /  TBili  2.3<H>  /  DBili  x   /  AST  31  /  ALT  46<H>  /  AlkPhos  99  12-12    LIVER FUNCTIONS - ( 12 Dec 2022 06:05 )  Alb: 3.0 g/dL / Pro: 6.3 g/dL / ALK PHOS: 99 U/L / ALT: 46 U/L / AST: 31 U/L / GGT: x               MICROBIOLOGY:  .Blood Blood-Venous  12-05-22   No Growth Final  --  --      .Blood Blood-Peripheral  12-02-22   No Growth Final  --  --      .Blood Blood-Peripheral  12-02-22   No Growth Final  --  --      Catheterized Catheterized  12-02-22   <10,000 CFU/mL Normal Urogenital Narda  --  --              v    Rapid RVP Result: Juanita (12-07 @ 07:04)          RADIOLOGY     TAMI DUNHAM 52y [id number]    Patient is a 52y old  Male who presents with a chief complaint of Unresponsive (12 Dec 2022 11:24)      Follow Up/CC:  ID following for fever    Interval History/ROS: s/p left knee arthrocentesis, fever+     Allergies    No Known Allergies    Intolerances        ANTIMICROBIALS:      MEDICATIONS  (STANDING):  aspirin  chewable 81 milliGRAM(s) Oral daily  bisacodyl Suppository 10 milliGRAM(s) Rectal daily  chlorhexidine 2% Cloths 1 Application(s) Topical daily  dextrose 5% + sodium chloride 0.9%. 1000 milliLiter(s) (50 mL/Hr) IV Continuous <Continuous>  dextrose 50% Injectable 25 Gram(s) IV Push once  dextrose 50% Injectable 12.5 Gram(s) IV Push once  dextrose 50% Injectable 25 Gram(s) IV Push once  dextrose Oral Gel 15 Gram(s) Oral once  diphtheria/tetanus/pertussis (acellular) Vaccine (Adacel) 0.5 milliLiter(s) IntraMuscular once  glucagon  Injectable 1 milliGRAM(s) IntraMuscular once  heparin  Infusion. 1300 Unit(s)/Hr (13 mL/Hr) IV Continuous <Continuous>  lacosamide IVPB 100 milliGRAM(s) IV Intermittent every 12 hours  levETIRAcetam  IVPB 1500 milliGRAM(s) IV Intermittent every 12 hours  potassium chloride  10 mEq/100 mL IVPB 10 milliEquivalent(s) IV Intermittent every 1 hour    MEDICATIONS  (PRN):  diphenhydrAMINE Injectable 12.5 milliGRAM(s) IV Push once PRN Insomnia  heparin   Injectable 8000 Unit(s) IV Push every 6 hours PRN For aPTT less than 40  heparin   Injectable 4000 Unit(s) IV Push every 6 hours PRN For aPTT between 40 - 57        Vital Signs Last 24 Hrs  T(C): 37.1 (12 Dec 2022 05:45), Max: 37.3 (11 Dec 2022 17:30)  T(F): 98.8 (12 Dec 2022 05:45), Max: 99.2 (11 Dec 2022 17:30)  HR: 108 (12 Dec 2022 05:45) (102 - 109)  BP: 121/72 (12 Dec 2022 05:45) (121/72 - 144/92)  BP(mean): --  RR: 19 (12 Dec 2022 05:45) (18 - 19)  SpO2: 96% (12 Dec 2022 05:45) (96% - 96%)    Parameters below as of 12 Dec 2022 05:45  Patient On (Oxygen Delivery Method): room air        CBC Full  -  ( 12 Dec 2022 06:05 )  WBC Count : 28.88 K/uL  RBC Count : 2.76 M/uL  Hemoglobin : 8.2 g/dL  Hematocrit : 24.7 %  Platelet Count - Automated : 289 K/uL  Mean Cell Volume : 89.5 fL  Mean Cell Hemoglobin : 29.7 pg  Mean Cell Hemoglobin Concentration : 33.2 gm/dL  Auto Neutrophil # : x  Auto Lymphocyte # : x  Auto Monocyte # : x  Auto Eosinophil # : x  Auto Basophil # : x  Auto Neutrophil % : x  Auto Lymphocyte % : x  Auto Monocyte % : x  Auto Eosinophil % : x  Auto Basophil % : x    12-12    143  |  108<H>  |  24<H>  ----------------------------<  137<H>  3.5   |  22  |  0.84    Ca    8.3<L>      12 Dec 2022 06:05  Phos  3.2     12-12  Mg     2.10     12-12    TPro  6.3  /  Alb  3.0<L>  /  TBili  2.3<H>  /  DBili  x   /  AST  31  /  ALT  46<H>  /  AlkPhos  99  12-12    LIVER FUNCTIONS - ( 12 Dec 2022 06:05 )  Alb: 3.0 g/dL / Pro: 6.3 g/dL / ALK PHOS: 99 U/L / ALT: 46 U/L / AST: 31 U/L / GGT: x               MICROBIOLOGY:  .Blood Blood-Venous  12-05-22   No Growth Final  --  --      .Blood Blood-Peripheral  12-02-22   No Growth Final  --  --      .Blood Blood-Peripheral  12-02-22   No Growth Final  --  --      Catheterized Catheterized  12-02-22   <10,000 CFU/mL Normal Urogenital Narda  --  --              v    Rapid RVP Result: Yanna (12-07 @ 07:04)          RADIOLOGY

## 2022-12-12 NOTE — CHART NOTE - NSCHARTNOTEFT_GEN_A_CORE
This is 52-year-old male with a PMHx significant for TIAs (2011, 2013), CVAs x3 (02/2022 s/p tPA, 8/3/2022 s/p tPA, 11/5/2022 with residual expressive aphasia) on Eliquis, and HLD BIBEMS after being found unresponsive at home on the floor, last known well 12/1 at around noon. C-collar was placed in ED. Patient was intubated in the ED for airway protection and noted to have high fever with tmax 105F. CTH with no acute findings, vEEG with no identified seizures. Patient was weaned off pressors, extubated, and transitioned to floors 12/4/22. 12/5 am RRT called for seizure like activity with urinary incontinence, tongue biting, and R>L posturing, patient s/p ativan 2mg x3. intubated for airway protection now extubated 12/8, now on medicine floor with continued AMS. Endocrine consulted about risk of AMS with prior testosterone use.    Discussed with primary team that testosterone use can lead to increased risk of CVA, and is higher risk in patients with history of CVA, such as this patient. Per primary team, they believe he is taking the testosterone without use of prescription. They do not know how much or how often he takes it. Current info about possible testosterone use is provided by the patient's girlfriend.    Recommendations:  Please get more history on patient's testosterone use including: indication, dose, and frequency, and how he is getting the medication if it is not being prescribed)  please  patient to stop taking testosterone given history of multiple CVA and TIA  There is no specific workup or treatment if patient is taking exogenous testosterone in excess   discontinue testosterone on discharge    Case discussed with primary team    Ran Harmon MD  Endocrinology Fellow

## 2022-12-13 LAB
ALBUMIN SERPL ELPH-MCNC: 2.6 G/DL — LOW (ref 3.3–5)
ALP SERPL-CCNC: 102 U/L — SIGNIFICANT CHANGE UP (ref 40–120)
ALT FLD-CCNC: 41 U/L — SIGNIFICANT CHANGE UP (ref 4–41)
ANION GAP SERPL CALC-SCNC: 11 MMOL/L — SIGNIFICANT CHANGE UP (ref 7–14)
APTT BLD: 20.4 SEC — LOW (ref 27–36.3)
AST SERPL-CCNC: 37 U/L — SIGNIFICANT CHANGE UP (ref 4–40)
BASOPHILS # BLD AUTO: 0 K/UL — SIGNIFICANT CHANGE UP (ref 0–0.2)
BASOPHILS NFR BLD AUTO: 0 % — SIGNIFICANT CHANGE UP (ref 0–2)
BILIRUB DIRECT SERPL-MCNC: 1.1 MG/DL — HIGH (ref 0–0.3)
BILIRUB INDIRECT FLD-MCNC: 1 MG/DL — SIGNIFICANT CHANGE UP (ref 0–1)
BILIRUB SERPL-MCNC: 2.1 MG/DL — HIGH (ref 0.2–1.2)
BILIRUB SERPL-MCNC: 2.1 MG/DL — HIGH (ref 0.2–1.2)
BLD GP AB SCN SERPL QL: NEGATIVE — SIGNIFICANT CHANGE UP
BUN SERPL-MCNC: 32 MG/DL — HIGH (ref 7–23)
CALCIUM SERPL-MCNC: 8.3 MG/DL — LOW (ref 8.4–10.5)
CHLORIDE SERPL-SCNC: 113 MMOL/L — HIGH (ref 98–107)
CO2 SERPL-SCNC: 21 MMOL/L — LOW (ref 22–31)
CREAT SERPL-MCNC: 0.85 MG/DL — SIGNIFICANT CHANGE UP (ref 0.5–1.3)
EGFR: 105 ML/MIN/1.73M2 — SIGNIFICANT CHANGE UP
EOSINOPHIL # BLD AUTO: 0 K/UL — SIGNIFICANT CHANGE UP (ref 0–0.5)
EOSINOPHIL NFR BLD AUTO: 0 % — SIGNIFICANT CHANGE UP (ref 0–6)
GLUCOSE BLDC GLUCOMTR-MCNC: 126 MG/DL — HIGH (ref 70–99)
GLUCOSE BLDC GLUCOMTR-MCNC: 128 MG/DL — HIGH (ref 70–99)
GLUCOSE BLDC GLUCOMTR-MCNC: 129 MG/DL — HIGH (ref 70–99)
GLUCOSE BLDC GLUCOMTR-MCNC: 133 MG/DL — HIGH (ref 70–99)
GLUCOSE SERPL-MCNC: 115 MG/DL — HIGH (ref 70–99)
HCT VFR BLD CALC: 21 % — CRITICAL LOW (ref 39–50)
HCT VFR BLD CALC: 21.6 % — LOW (ref 39–50)
HGB BLD-MCNC: 6.9 G/DL — CRITICAL LOW (ref 13–17)
HGB BLD-MCNC: 7.1 G/DL — LOW (ref 13–17)
IANC: 22.45 K/UL — HIGH (ref 1.8–7.4)
INR BLD: 1.26 RATIO — HIGH (ref 0.88–1.16)
LYMPHOCYTES # BLD AUTO: 1.17 K/UL — SIGNIFICANT CHANGE UP (ref 1–3.3)
LYMPHOCYTES # BLD AUTO: 4.4 % — LOW (ref 13–44)
MAGNESIUM SERPL-MCNC: 2.2 MG/DL — SIGNIFICANT CHANGE UP (ref 1.6–2.6)
MCHC RBC-ENTMCNC: 29.9 PG — SIGNIFICANT CHANGE UP (ref 27–34)
MCHC RBC-ENTMCNC: 30 PG — SIGNIFICANT CHANGE UP (ref 27–34)
MCHC RBC-ENTMCNC: 32.9 GM/DL — SIGNIFICANT CHANGE UP (ref 32–36)
MCHC RBC-ENTMCNC: 32.9 GM/DL — SIGNIFICANT CHANGE UP (ref 32–36)
MCV RBC AUTO: 90.9 FL — SIGNIFICANT CHANGE UP (ref 80–100)
MCV RBC AUTO: 91.1 FL — SIGNIFICANT CHANGE UP (ref 80–100)
MONOCYTES # BLD AUTO: 1.14 K/UL — HIGH (ref 0–0.9)
MONOCYTES NFR BLD AUTO: 4.3 % — SIGNIFICANT CHANGE UP (ref 2–14)
MRSA PCR RESULT.: SIGNIFICANT CHANGE UP
NEUTROPHILS # BLD AUTO: 24.29 K/UL — HIGH (ref 1.8–7.4)
NEUTROPHILS NFR BLD AUTO: 91.3 % — HIGH (ref 43–77)
NRBC # BLD: 0 /100 WBCS — SIGNIFICANT CHANGE UP (ref 0–0)
NRBC # FLD: 0.02 K/UL — HIGH (ref 0–0)
PHOSPHATE SERPL-MCNC: 3.4 MG/DL — SIGNIFICANT CHANGE UP (ref 2.5–4.5)
PLATELET # BLD AUTO: 314 K/UL — SIGNIFICANT CHANGE UP (ref 150–400)
PLATELET # BLD AUTO: 330 K/UL — SIGNIFICANT CHANGE UP (ref 150–400)
POTASSIUM SERPL-MCNC: 3.8 MMOL/L — SIGNIFICANT CHANGE UP (ref 3.5–5.3)
POTASSIUM SERPL-SCNC: 3.8 MMOL/L — SIGNIFICANT CHANGE UP (ref 3.5–5.3)
PROT SERPL-MCNC: 6 G/DL — SIGNIFICANT CHANGE UP (ref 6–8.3)
PROTHROM AB SERPL-ACNC: 14.7 SEC — HIGH (ref 10.5–13.4)
RBC # BLD: 2.31 M/UL — LOW (ref 4.2–5.8)
RBC # BLD: 2.37 M/UL — LOW (ref 4.2–5.8)
RBC # FLD: 15.6 % — HIGH (ref 10.3–14.5)
RBC # FLD: 15.8 % — HIGH (ref 10.3–14.5)
RH IG SCN BLD-IMP: NEGATIVE — SIGNIFICANT CHANGE UP
S AUREUS DNA NOSE QL NAA+PROBE: SIGNIFICANT CHANGE UP
SODIUM SERPL-SCNC: 145 MMOL/L — SIGNIFICANT CHANGE UP (ref 135–145)
TESTOST FREE+TOTAL PANEL SERPL-MCNC: 32.2 NG/DL — LOW (ref 193–740)
WBC # BLD: 21.4 K/UL — HIGH (ref 3.8–10.5)
WBC # BLD: 26.6 K/UL — HIGH (ref 3.8–10.5)
WBC # FLD AUTO: 21.4 K/UL — HIGH (ref 3.8–10.5)
WBC # FLD AUTO: 26.6 K/UL — HIGH (ref 3.8–10.5)

## 2022-12-13 PROCEDURE — 99222 1ST HOSP IP/OBS MODERATE 55: CPT | Mod: GC

## 2022-12-13 PROCEDURE — 99232 SBSQ HOSP IP/OBS MODERATE 35: CPT

## 2022-12-13 RX ORDER — OXYCODONE AND ACETAMINOPHEN 5; 325 MG/1; MG/1
1 TABLET ORAL ONCE
Refills: 0 | Status: DISCONTINUED | OUTPATIENT
Start: 2022-12-13 | End: 2022-12-13

## 2022-12-13 RX ORDER — HYDROMORPHONE HYDROCHLORIDE 2 MG/ML
0.5 INJECTION INTRAMUSCULAR; INTRAVENOUS; SUBCUTANEOUS ONCE
Refills: 0 | Status: DISCONTINUED | OUTPATIENT
Start: 2022-12-13 | End: 2022-12-13

## 2022-12-13 RX ORDER — ASPIRIN/CALCIUM CARB/MAGNESIUM 324 MG
300 TABLET ORAL DAILY
Refills: 0 | Status: DISCONTINUED | OUTPATIENT
Start: 2022-12-13 | End: 2022-12-13

## 2022-12-13 RX ADMIN — Medication 10 MILLIGRAM(S): at 11:54

## 2022-12-13 RX ADMIN — HYDROMORPHONE HYDROCHLORIDE 0.5 MILLIGRAM(S): 2 INJECTION INTRAMUSCULAR; INTRAVENOUS; SUBCUTANEOUS at 17:17

## 2022-12-13 RX ADMIN — LEVETIRACETAM 400 MILLIGRAM(S): 250 TABLET, FILM COATED ORAL at 17:28

## 2022-12-13 RX ADMIN — CHLORHEXIDINE GLUCONATE 1 APPLICATION(S): 213 SOLUTION TOPICAL at 11:53

## 2022-12-13 RX ADMIN — HYDROMORPHONE HYDROCHLORIDE 0.5 MILLIGRAM(S): 2 INJECTION INTRAMUSCULAR; INTRAVENOUS; SUBCUTANEOUS at 16:47

## 2022-12-13 RX ADMIN — Medication 12.5 MILLIGRAM(S): at 00:02

## 2022-12-13 RX ADMIN — LACOSAMIDE 120 MILLIGRAM(S): 50 TABLET ORAL at 05:03

## 2022-12-13 RX ADMIN — LEVETIRACETAM 400 MILLIGRAM(S): 250 TABLET, FILM COATED ORAL at 05:59

## 2022-12-13 RX ADMIN — LACOSAMIDE 120 MILLIGRAM(S): 50 TABLET ORAL at 17:30

## 2022-12-13 NOTE — PROGRESS NOTE ADULT - ASSESSMENT
52-year-old male with a PMHx significant for TIAs (2011, 2013), CVAs x3 (02/2022 s/p tPA, 8/3/2022 s/p tPA, 11/5/2022 with residual expressive aphasia) on Eliquis, and HLD BIBEMS after being found unresponsive at home on the floor, last known well 12/1 at around noon. C-collar was placed. Patient was intubated in the ED for airway protection. CTH with no acute findings, vEEG with no identified seizures. Patient was weaned off pressors, extubated, and transitioned to floors 12/4/22. 12/5 am RRT called for seizure like activity with urinary incontinence, tongue biting, and R>L posturing, patient s/p ativan 2mg x3. Anesthesia called to RRT for intubation. MICU accepting patient for seizure like activity requiring intubation.      #AMS, Seizure like activity  EEG 12/7: concern for epileptiform activity; though EEG from 12/5 without such abnormalities.   - f/u neuro recs  - Patient found down and unresponsive at home on 12/2, last known well 12/1 at around noon. Intubated in ED 12/2, extubated in MICU 12/4  - RRT 12/5: seizure like acting with urinary incontinence, convulsions, and tongue biting; s/p ativan 2mg x3 with pauses in seizure activity following each; intubated for airway protection with rocuronium. Prolactin ~75   - CTH and CT cervical spine 12/2 negative for any acute pathologies. Tox screen on admission negative. 12/5: Stable exam from prior CT head, chronic microvascular ischemic changes, parenchymal loss, dystrophic calcification of central portion of alexis unchanged.  - Video EEG 12/3-12/4 without any seizure like activity, moderate to severe nonspecific diffuse or multifocal cerebral dysfunction  - propofol off since 3am 12/6  - c/w keppra 1500mg q12 maintenance, Keppra loaded 4.5g  - patient extubated again on 12/8th  -MRI noted, chronci CVA, no acute CVA noted         #CVAs/TIAs  - Hx of TIAs in 2011 and 2013, CVAs x3 in 02/22, 8/22, and 11/22 with residual expressive aphasia   - on Eliquis and Aspirin at home for hx of stroke  - PFO work up in past negative, no evidence of PFO on MIMI X2  - c/w ASA   - c/w heparin gtt  - Restart Statin when LFTs and CPK improve per neurology  - Being worked up for Mixed Connective Tissue Disease OP- f/u p-ANCA, c-ANCA/ [id number] negative  - Neuro recs appreciated    #Thecal sac flattening   - CT thoracic and lumbar spine showing degenerative disc disease T6-T7 through L4-L5 and mild disc bulges at L2-L3 through L4-L5 that flatten the ventral thecal sac and narrowing of the bilateral neural foramina  - Will Monitor for now, will consider neuro/neurosurg evaluation in the future    #HLD  - atorvastatin on hold due to elevated CK and LFTs  - restart when possible for secondary stroke prevention    #?ASD/PFO  - Patient reportedly found to have a PFO in February 2022 during a stroke workup at [facility]. Patient presented for a PFO closure in November 2022. Notably, no PFO was found at the time and no intervention was performed by Dr. Lynn.    - TTE 11/5/22 EF 57% and grossly normal LV function  - MIMI performed bedside 12/5 1 of 2 studies (+) on bubble study (uploaded to [facility])        #Rhabdomyolysis  - CK 8720 on admission, peaked at 15k, now downtrending  - DDx: prolonged downtime myositis v hyperthermia? (T 105.4 F) v sepsis v seizure induced  - stopped IVF 12/7 since CK ~2k  - Continue checking CK daily    # Elevated Winston level  CHeck Abd US noted    GI eval PRN   Trend LFTs       #Partial SBO - resolved  - CT chest, abdomen, and pelvis w/ contrast concerning for possible partial SBO without transition point.  - Surgery consulted, no acute intervention at this time  - 12/5 KUB - negative for ileus or SBO  - Hold TF for 12/7 for possible trial of extubation.  - otherwise diet per nutrition      #Leukocytosis  - worsening leukocytosis  - monitor for fever  - Ortho eval fro knee swelling   - Pan Cx      #Concern for sepsis  Unclear source, aspiration v less likely meningitis   BCx (12/2 NGTD repeat 12/5 NGTD) and UC 12/2 NGTD  - Patient initially presented with AMS, T 105.4, tachycardic, and tachypneic   - UA negative  - s/p vanco and zosyn x1 in ED 12/2, ceftriaxone 2g BID 12/3-12/4, vanco 12/3-12/4  - 12/7 DCed Vanc since BCx from 12/5 NGTD  - c/w zosyn for aspiration pna presumed. completed course, monitor   - ID eval appreciated  - febrile again, Ortho for knee asp      # ANemia  noted, no gross bleeding  type and screen  GI eval

## 2022-12-13 NOTE — PROGRESS NOTE ADULT - SUBJECTIVE AND OBJECTIVE BOX
Subjective: Patient seen and examined. No new events except as noted.     REVIEW OF SYSTEMS:    CONSTITUTIONAL:+ weakness, fevers or chills  EYES/ENT: No visual changes;  No vertigo or throat pain   NECK: No pain or stiffness  RESPIRATORY: No cough, wheezing, hemoptysis; No shortness of breath  CARDIOVASCULAR: No chest pain or palpitations  GASTROINTESTINAL: No abdominal or epigastric pain. No nausea, vomiting, or hematemesis; No diarrhea or constipation. No melena or hematochezia.  GENITOURINARY: No dysuria, frequency or hematuria  NEUROLOGICAL: No numbness or weakness  SKIN: No itching, burning, rashes, or lesions   All other review of systems is negative unless indicated above.    MEDICATIONS:  MEDICATIONS  (STANDING):  aspirin Suppository 300 milliGRAM(s) Rectal daily  bisacodyl Suppository 10 milliGRAM(s) Rectal daily  chlorhexidine 2% Cloths 1 Application(s) Topical daily  dextrose 5% + sodium chloride 0.9%. 1000 milliLiter(s) (50 mL/Hr) IV Continuous <Continuous>  dextrose 50% Injectable 25 Gram(s) IV Push once  dextrose 50% Injectable 12.5 Gram(s) IV Push once  dextrose 50% Injectable 25 Gram(s) IV Push once  dextrose Oral Gel 15 Gram(s) Oral once  diphtheria/tetanus/pertussis (acellular) Vaccine (Adacel) 0.5 milliLiter(s) IntraMuscular once  glucagon  Injectable 1 milliGRAM(s) IntraMuscular once  lacosamide IVPB 100 milliGRAM(s) IV Intermittent every 12 hours  levETIRAcetam  IVPB 1500 milliGRAM(s) IV Intermittent every 12 hours      PHYSICAL EXAM:  T(C): 36.7 (12-13-22 @ 04:54), Max: 38.4 (12-12-22 @ 10:44)  HR: 99 (12-13-22 @ 04:54) (97 - 107)  BP: 117/71 (12-13-22 @ 04:54) (117/64 - 133/85)  RR: 17 (12-13-22 @ 04:54) (17 - 18)  SpO2: 98% (12-13-22 @ 04:54) (96% - 99%)  Wt(kg): --  I&O's Summary          Appearance: NAD  HEENT: dry oral mucosa, PERRL, EOMI - L temporal abrasion	  Lymphatic: No lymphadenopathy , no edema  Cardiovascular: Normal S1 S2, No JVD, No murmurs , Peripheral pulses palpable 2+ bilaterally  Respiratory: Coarse BS	  Gastrointestinal:  Soft, Non-tender, + BS	  Skin: No rashes, + ecchymoses, No cyanosis, warm to touch - BL mittes  Neurological Exam:    Mental Status: Awake, Oriented x 1-2, followign some simple. minimal verbal saying few words,   Cranial Nerves: PERRL, EOMI, VFFC, sensation V1-V3 intact,  no obvious facial asymmetry    Motor: Moving uppers > lowers. uppers at least 3-4/5 in mittens   Sensation:minimal withdrawal to noxious x 4  R>L   Reflexes: 1+ throughout at biceps, brachioradialis, triceps, patellars and ankles bilaterally and equal. No clonus. R toe and L toe were both downgoing.  Coordination: unable   Gait: unable   Ext: No edema      LABS:    CARDIAC MARKERS:  CARDIAC MARKERS ( 12 Dec 2022 06:05 )  x     / x     / 506 U/L / x     / x      CARDIAC MARKERS ( 11 Dec 2022 06:40 )  x     / x     / 427 U/L / x     / x                                    7.1    26.60 )-----------( 314      ( 13 Dec 2022 05:30 )             21.6     12-13    145  |  113<H>  |  32<H>  ----------------------------<  115<H>  3.8   |  21<L>  |  0.85    Ca    8.3<L>      13 Dec 2022 05:30  Phos  3.4     12-13  Mg     2.20     12-13    TPro  6.0  /  Alb  2.6<L>  /  TBili  2.1<H>  /  DBili  1.1<H>  /  AST  37  /  ALT  41  /  AlkPhos  102  12-13    proBNP:   Lipid Profile:   HgA1c:   TSH:             TELEMETRY: 	 SR   ECG:  	  RADIOLOGY:   DIAGNOSTIC TESTING:  [ ] Echocardiogram:  < from: TTE with Doppler (w/Cont) (11.05.22 @ 11:52) >    Patient name: TAMI DUNHAM  YOB: 1970   Age: 52 (M)   MR#: [id number]  Study Date: 11/5/2022  Location: [facility]  DSonographer: RAFFI Mckeon  Study quality: Technically good  Referring Physician:   Chrystal Viera MD  Blood Pressure: 134/86 mmHg  Height: 191 cm  Weight: 95 kg  BSA: 2.2 m2  ------------------------------------------------------------------------  PROCEDURE: Transthoracic echocardiogram with 2-D, M-Mode  and complete spectral and color flow Doppler. Real-time and  reconstructed 3-dimensional imaging was performed with  Workstation. Color Doppler analysis was carried out using  both 2D and 3D mapping. Verbal consent was obtained for  injection of  Ultrasonic Enhancing Agent following a  discussion of risks and benefits. Following intravenous  injection of Ultrasonic Enhancing Agent, harmonic imaging  was performed.  INDICATION: Other cerebrovascular disease (I67.89)  ------------------------------------------------------------------------  Dimensions:    Normal Values:  LA:     3.1    2.0 - 4.0 cm  Ao:     3.4    2.0 - 3.8 cm  SEPTUM: 1.0    0.6 - 1.2 cm  PWT:    0.9    0.6 - 1.1 cm  LVIDd:  4.5    3.0 - 5.6 cm  LVIDs:  3.2    1.8 - 4.0 cm  Derived variables:  LVMI: 64 g/m2  RWT: 0.40  Fractionalshort: 29 %  EF (3D Quantification): 57 %Doppler Peak Velocity (m/sec):  AoV=1.1  ------------------------------------------------------------------------  Observations:  Mitral Valve: Normal mitral valve.  Aortic Valve/Aorta: Normal trileaflet aortic valve. Peak  transaortic valve gradient equals 5 mm Hg. Peak left  ventricular outflow tract gradient equals 3 mm Hg, mean  gradient is equal to 2 mm Hg, LVOT velocity time integral  equals 21 cm.  Aortic Root: 3.4 cm.  Sinotubular Junction: 3 cm.  Ascending Aorta: 3.6 cm.  LVOT diameter: 2.2 cm.  Aortic arch 3.9 cm.  Left Atrium: Normal left atrium.  LA volume index = 22  cc/m2.  Left Ventricle: Normal left ventricular systolic function.  No segmental wall motion abnormalities. Normal left  ventricular internal dimensions and wall thicknesses.  Normal diastolic function  Right Heart: Normal right atrium. Normal right ventricular  size and function. Normal tricuspid valve. Minimal  tricuspid regurgitation. Normal pulmonic valve.  Pericardium/Pleura: Normal pericardium with no pericardial  effusion.  Hemodynamic: Estimated right atrial pressure is 8 mm Hg.  ------------------------------------------------------------------------  Conclusions:  1. Aortic Root: 3.4 cm.  Sinotubular Junction: 3 cm.  Ascending Aorta: 3.6 cm.  LVOT diameter: 2.2 cm.  Aortic arch 3.9 cm.  2. Normal left ventricular internal dimensions and wall  thicknesses.  3. Normal left ventricular systolic function. No segmental  wall motion abnormalities.  4. Normal diastolic function  5. Normal right ventricular size and function.  ------------------------------------------------------------------------  Confirmed on  11/5/2022 - 14:10:41 by MARY Coreas  ------------------------------------------------------------------------    < end of copied text >    [ ]  Catheterization:  [ ] Stress Test:    OTHER:

## 2022-12-13 NOTE — CONSULT NOTE ADULT - SUBJECTIVE AND OBJECTIVE BOX
GENERAL SURGERY CONSULT NOTE  --------------------------------------------------------------------------------------------    HPI:   Patient is a 52y old  Male who presents with a chief complaint of Unresponsive (13 Dec 2022 11:38)    HPI:  52-year-old male with a PMHx significant for TIAs (2011, 2013), CVAs x3 (02/2022 s/p tPA, 8/3/2022 s/p tPA, 11/5/2022 with residual expressive aphasia) on Eliquis, and HLD BIBEMS after being found unresponsive at home. Per patient's 2 friends in the ED, patient was last spoken to around noon on 12/1. No one had heard from him or seen him since yesterday, was not responding to calls this am, so they went to check on him today and they found him unresponsive at home with head on floor turned to the side and one leg was on the bed. Patient was snoring per his friends. Friends called EMS. EMS states he was grunting but minimally responsive. Patient arrived to the ER covered in dried blood in his mouth with multiple ecchymosis on the left side of his face, neck,, chest, and arm. Patient obtunded. Friend denies hx of ETOH or drug use. Of note, patient had a questionable ASD/PFO with possible closure on 11/22/22; per patient's friends no PFO was found and no closure was performed.  Per outpatient records, patient previously on testosterone transdermal solution which was stopped on 11/15 by his urologist. Per patient's friends at bedside, patient was recently prescribed a mail-order testosterone replacement, unsure of the name and unsure if he received and started the medication. At baseline, patient is AOx4, able to ambulate without assistance, and takes care of all ADLs without assistance.     On arrival, patient with rectal temp 105.4 F, tachy to 130s, hypertensive to 180/110, and tachypneic to 30. C-collar was placed. Patient was intubated in the ED for airway protection with etomidate and succinylcholine. Patient sedated with propofol and fentanyl. Started on vanco and zosyn. 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 CT cervical spine and maxillofacial scans negative. CT chest, abdomen, and pelvis w/ contrast concerning for possible partial SBO without transition point. Surgery consulted in ED, no acute surgical intervention at this time. CT thoracic and lumbar spine showing degenerative disc disease T6-T7 through L4-L5 and mild disc bulges at L2-L3 through L4-L5 that flatten the ventral thecal sac and narrowing of the bilateral neural foramina.      ***      PAST MEDICAL & SURGICAL HISTORY:  History of TIAs      CVA (cerebrovascular accident)      HLD (hyperlipidemia)      Vertigo      Unresponsiveness      No significant past surgical history        FAMILY HISTORY:  No pertinent family history in first degree relatives    [] Family history not pertinent as reviewed with the patient and family    SOCIAL HISTORY:  ***    ALLERGIES: No Known Allergies      HOME MEDICATIONS: ***    CURRENT MEDICATIONS  MEDICATIONS (STANDING): aspirin Suppository 300 milliGRAM(s) Rectal daily  bisacodyl Suppository 10 milliGRAM(s) Rectal daily  dextrose 5% + sodium chloride 0.9%. 1000 milliLiter(s) IV Continuous <Continuous>  dextrose 50% Injectable 25 Gram(s) IV Push once  dextrose 50% Injectable 12.5 Gram(s) IV Push once  dextrose 50% Injectable 25 Gram(s) IV Push once  dextrose Oral Gel 15 Gram(s) Oral once  diphtheria/tetanus/pertussis (acellular) Vaccine (Adacel) 0.5 milliLiter(s) IntraMuscular once  glucagon  Injectable 1 milliGRAM(s) IntraMuscular once  lacosamide IVPB 100 milliGRAM(s) IV Intermittent every 12 hours  levETIRAcetam  IVPB 1500 milliGRAM(s) IV Intermittent every 12 hours    MEDICATIONS (PRN):heparin   Injectable 8000 Unit(s) IV Push every 6 hours PRN For aPTT less than 40  heparin   Injectable 4000 Unit(s) IV Push every 6 hours PRN For aPTT between 40 - 57    --------------------------------------------------------------------------------------------    Vitals:   T(C): 36.8 (12-13-22 @ 09:00), Max: 37.6 (12-12-22 @ 13:02)  HR: 99 (12-13-22 @ 09:00) (97 - 102)  BP: 120/74 (12-13-22 @ 09:00) (117/64 - 123/82)  RR: 19 (12-13-22 @ 09:00) (17 - 19)  SpO2: 98% (12-13-22 @ 09:00) (96% - 99%)  CAPILLARY BLOOD GLUCOSE      POCT Blood Glucose.: 128 mg/dL (13 Dec 2022 06:29)  POCT Blood Glucose.: 133 mg/dL (13 Dec 2022 00:55)  POCT Blood Glucose.: 139 mg/dL (12 Dec 2022 19:11)  POCT Blood Glucose.: 143 mg/dL (12 Dec 2022 13:10)            PHYSICAL EXAM: ***  General: NAD, Lying in bed comfortably  Neuro: A+Ox3  HEENT: NC/AT, EOMI  Neck: Soft, supple  Cardio: RRR, nml S1/S2  Resp: Good effort, CTA b/l  Thorax: No chest wall tenderness  Breast: No lesions/masses, no drainage  GI/Abd: Soft, NT/ND, no rebound/guarding, no masses palpated  Vascular: All 4 extremities warm.  Skin: Intact, no breakdown  Lymphatic/Nodes: No palpable lymphadenopathy  Musculoskeletal: All 4 extremities moving spontaneously, no limitations  --------------------------------------------------------------------------------------------    LABS  CBC (12-13 @ 05:30)                              7.1<L>                         26.60<H>  )----------------(  314        91.3<H>% Neutrophils, 4.4<L>% Lymphocytes, ANC: 24.29<H>                              21.6<L>  CBC (12-12 @ 06:05)                              8.2<L>                         28.88<H>  )----------------(  289        --    % Neutrophils, --    % Lymphocytes, ANC: --                                  24.7<L>    BMP (12-13 @ 05:30)             145     |  113<H>  |  32<H> 		Ca++ --      Ca 8.3<L>             ---------------------------------( 115<H>		Mg 2.20               3.8     |  21<L>   |  0.85  			Ph 3.4     BMP (12-12 @ 06:05)             143     |  108<H>  |  24<H> 		Ca++ --      Ca 8.3<L>             ---------------------------------( 137<H>		Mg 2.10               3.5     |  22      |  0.84  			Ph 3.2       LFTs (12-13 @ 05:30)      TPro 6.0 / Alb 2.6<L> / TBili 2.1<H> / DBili 1.1<H> / AST 37 / ALT 41 / AlkPhos 102  LFTs (12-12 @ 06:05)      TPro 6.3 / Alb 3.0<L> / TBili 2.3<H> / DBili -- / AST 31 / ALT 46<H> / AlkPhos 99    Coags (12-12 @ 06:05)  aPTT 52.8<H> / INR 1.34<H> / PT 15.6<H>    Cardiac Markers (12-12 @ 06:05)     HSTrop: -- / CKMB: -- / CK: 506    ABG (12-12 @ 14:05)      /  /  /  /  / %     Lactate:  1.2      --------------------------------------------------------------------------------------------    MICROBIOLOGY    -> .Body Fluid Synovial Fluid Culture (12-12 @ 10:55)       polymorphonuclear leukocytes  No organisms seen  by cytocentrifuge    NG    No growth    -> .Blood Blood-Venous Culture (12-05 @ 16:55)     NG    NG    No Growth Final      --------------------------------------------------------------------------------------------    IMAGING  ***    --------------------------------------------------------------------------------------------    ASSESSMENT: Patient is a 52ym pmhx ***    PLAN:  ***  -   -   -   -   - Patient seen/examined with attending.  - Plan to be discussed with Attending,   GENERAL SURGERY CONSULT NOTE  --------------------------------------------------------------------------------------------    HPI:   Patient is a 52y old  Male who presents with a chief complaint of Unresponsive (13 Dec 2022 11:38)    HPI:  52-year-old male with a PMHx significant for TIAs (2011, 2013), CVAs x3 (02/2022 s/p tPA, 8/3/2022 s/p tPA, 11/5/2022 with residual expressive aphasia) on Eliquis, and HLD BIBEMS after being found unresponsive at home. Per patient's 2 friends in the ED, patient was last spoken to around noon on 12/1. No one had heard from him or seen him since yesterday, was not responding to calls this am, so they went to check on him today and they found him unresponsive at home with head on floor turned to the side and one leg was on the bed. Patient was snoring per his friends. Friends called EMS. EMS states he was grunting but minimally responsive. Patient arrived to the ER covered in dried blood in his mouth with multiple ecchymosis on the left side of his face, neck,, chest, and arm. Patient obtunded. Friend denies hx of ETOH or drug use. Of note, patient had a questionable ASD/PFO with possible closure on 11/22/22; per patient's friends no PFO was found and no closure was performed.  Per outpatient records, patient previously on testosterone transdermal solution which was stopped on 11/15 by his urologist. Per patient's friends at bedside, patient was recently prescribed a mail-order testosterone replacement, unsure of the name and unsure if he received and started the medication. At baseline, patient is AOx4, able to ambulate without assistance, and takes care of all ADLs without assistance.       The pt has had multiple episodes of seizure activity while in the hospital, with unclear source. The pt now has an elevated WBC and subjective RUQ pain. An US was performed which demonstrates cholelithiasis w/o evidence of cholecystitis and a CBD of 5mm.     ***      PAST MEDICAL & SURGICAL HISTORY:  History of TIAs      CVA (cerebrovascular accident)      HLD (hyperlipidemia)      Vertigo      Unresponsiveness      No significant past surgical history        FAMILY HISTORY:  No pertinent family history in first degree relatives    [] Family history not pertinent as reviewed with the patient and family    SOCIAL HISTORY:  ***    ALLERGIES: No Known Allergies      HOME MEDICATIONS: ***    CURRENT MEDICATIONS  MEDICATIONS (STANDING): aspirin Suppository 300 milliGRAM(s) Rectal daily  bisacodyl Suppository 10 milliGRAM(s) Rectal daily  dextrose 5% + sodium chloride 0.9%. 1000 milliLiter(s) IV Continuous <Continuous>  dextrose 50% Injectable 25 Gram(s) IV Push once  dextrose 50% Injectable 12.5 Gram(s) IV Push once  dextrose 50% Injectable 25 Gram(s) IV Push once  dextrose Oral Gel 15 Gram(s) Oral once  diphtheria/tetanus/pertussis (acellular) Vaccine (Adacel) 0.5 milliLiter(s) IntraMuscular once  glucagon  Injectable 1 milliGRAM(s) IntraMuscular once  lacosamide IVPB 100 milliGRAM(s) IV Intermittent every 12 hours  levETIRAcetam  IVPB 1500 milliGRAM(s) IV Intermittent every 12 hours    MEDICATIONS (PRN):heparin   Injectable 8000 Unit(s) IV Push every 6 hours PRN For aPTT less than 40  heparin   Injectable 4000 Unit(s) IV Push every 6 hours PRN For aPTT between 40 - 57    --------------------------------------------------------------------------------------------    Vitals:   T(C): 36.8 (12-13-22 @ 09:00), Max: 37.6 (12-12-22 @ 13:02)  HR: 99 (12-13-22 @ 09:00) (97 - 102)  BP: 120/74 (12-13-22 @ 09:00) (117/64 - 123/82)  RR: 19 (12-13-22 @ 09:00) (17 - 19)  SpO2: 98% (12-13-22 @ 09:00) (96% - 99%)  CAPILLARY BLOOD GLUCOSE      POCT Blood Glucose.: 128 mg/dL (13 Dec 2022 06:29)  POCT Blood Glucose.: 133 mg/dL (13 Dec 2022 00:55)  POCT Blood Glucose.: 139 mg/dL (12 Dec 2022 19:11)  POCT Blood Glucose.: 143 mg/dL (12 Dec 2022 13:10)            PHYSICAL EXAM: ***  General: NAD, Lying in bed comfortably  Neuro: A+Ox3  HEENT: NC/AT, EOMI  Neck: Soft, supple  Cardio: RRR, nml S1/S2  GI/Abd: Soft, mild RUQ tenderness, nondistended, no rebound/guarding, no masses palpated    --------------------------------------------------------------------------------------------    LABS  CBC (12-13 @ 05:30)                              7.1<L>                         26.60<H>  )----------------(  314        91.3<H>% Neutrophils, 4.4<L>% Lymphocytes, ANC: 24.29<H>                              21.6<L>  CBC (12-12 @ 06:05)                              8.2<L>                         28.88<H>  )----------------(  289        --    % Neutrophils, --    % Lymphocytes, ANC: --                                  24.7<L>    BMP (12-13 @ 05:30)             145     |  113<H>  |  32<H> 		Ca++ --      Ca 8.3<L>             ---------------------------------( 115<H>		Mg 2.20               3.8     |  21<L>   |  0.85  			Ph 3.4     BMP (12-12 @ 06:05)             143     |  108<H>  |  24<H> 		Ca++ --      Ca 8.3<L>             ---------------------------------( 137<H>		Mg 2.10               3.5     |  22      |  0.84  			Ph 3.2       LFTs (12-13 @ 05:30)      TPro 6.0 / Alb 2.6<L> / TBili 2.1<H> / DBili 1.1<H> / AST 37 / ALT 41 / AlkPhos 102  LFTs (12-12 @ 06:05)      TPro 6.3 / Alb 3.0<L> / TBili 2.3<H> / DBili -- / AST 31 / ALT 46<H> / AlkPhos 99    Coags (12-12 @ 06:05)  aPTT 52.8<H> / INR 1.34<H> / PT 15.6<H>    Cardiac Markers (12-12 @ 06:05)     HSTrop: -- / CKMB: -- / CK: 506    ABG (12-12 @ 14:05)      /  /  /  /  / %     Lactate:  1.2      --------------------------------------------------------------------------------------------    MICROBIOLOGY    -> .Body Fluid Synovial Fluid Culture (12-12 @ 10:55)       polymorphonuclear leukocytes  No organisms seen  by cytocentrifuge    NG    No growth    -> .Blood Blood-Venous Culture (12-05 @ 16:55)     NG    NG    No Growth Final      --------------------------------------------------------------------------------------------    IMAGING  < from: US Abdomen Complete (US Abdomen Complete .) (12.12.22 @ 12:35) >    FINDINGS:  Technically difficult and limited exam due patient difficulties with   respiratory control.    Liver: Partially obscured by rib shadowing. Visualized portions of the   liver are grossly normal.  Bile ducts: Normal caliber. Common bile duct measures 5 mm.  Gallbladder: Cholelithiasis and sludge. No wall thickening or   pericholecystic edema.  Pancreas: Not visualized.  Spleen: 10.4 cm. Within normal limits.  Right kidney: 13.0 cm. Limited visualization. Mild fullnessof an   extrarenal pelvis without overt hydronephrosis.  Left kidney: 13.2 cm. Limited visualization. No hydronephrosis.  Urinary bladder: Underdistended.  Ascites: None.  Aorta and IVC: Visualized portions are within normal limits.    IMPRESSION:  Cholelithiasis and gallbladder sludge. No sonographic evidence for acute   cholecystitis. No biliary duct dilation.    < end of copied text >  ***    --------------------------------------------------------------------------------------------

## 2022-12-13 NOTE — PROGRESS NOTE ADULT - ASSESSMENT
52-year-old male with a PMHx significant for TIAs (2011, 2013), CVAs x3 (02/2022 s/p tPA, 8/3/2022 s/p tPA, 11/5/2022 with residual expressive aphasia) on Eliquis, and HLD BIBEMS after being found unresponsive at home with possible aspiration pna, s/p micu stay, leukocytosis, knee hemarthrosis, fever    Wong Ramírez  Attending Physician   Division of Infectious Disease  Office #[phone]  Available on [employer] Teams also  After 5pm/weekend or no response, call #[phone]

## 2022-12-13 NOTE — PROGRESS NOTE ADULT - SUBJECTIVE AND OBJECTIVE BOX
TAMI DUNHAM 52y [id number]    Patient is a 52y old  Male who presents with a chief complaint of Unresponsive (13 Dec 2022 09:45)      Follow Up/CC:  ID following for    Interval History/ROS:    Allergies    No Known Allergies    Intolerances        ANTIMICROBIALS:      MEDICATIONS  (STANDING):  aspirin Suppository 300 milliGRAM(s) Rectal daily  bisacodyl Suppository 10 milliGRAM(s) Rectal daily  chlorhexidine 2% Cloths 1 Application(s) Topical daily  dextrose 5% + sodium chloride 0.9%. 1000 milliLiter(s) (50 mL/Hr) IV Continuous <Continuous>  dextrose 50% Injectable 25 Gram(s) IV Push once  dextrose 50% Injectable 12.5 Gram(s) IV Push once  dextrose 50% Injectable 25 Gram(s) IV Push once  dextrose Oral Gel 15 Gram(s) Oral once  diphtheria/tetanus/pertussis (acellular) Vaccine (Adacel) 0.5 milliLiter(s) IntraMuscular once  glucagon  Injectable 1 milliGRAM(s) IntraMuscular once  lacosamide IVPB 100 milliGRAM(s) IV Intermittent every 12 hours  levETIRAcetam  IVPB 1500 milliGRAM(s) IV Intermittent every 12 hours    MEDICATIONS  (PRN):  heparin   Injectable 8000 Unit(s) IV Push every 6 hours PRN For aPTT less than 40  heparin   Injectable 4000 Unit(s) IV Push every 6 hours PRN For aPTT between 40 - 57        Vital Signs Last 24 Hrs  T(C): 36.8 (13 Dec 2022 09:00), Max: 37.6 (12 Dec 2022 13:02)  T(F): 98.3 (13 Dec 2022 09:00), Max: 99.7 (12 Dec 2022 13:02)  HR: 99 (13 Dec 2022 09:00) (97 - 102)  BP: 120/74 (13 Dec 2022 09:00) (117/64 - 123/82)  BP(mean): --  RR: 19 (13 Dec 2022 09:00) (17 - 19)  SpO2: 98% (13 Dec 2022 09:00) (96% - 99%)    Parameters below as of 13 Dec 2022 09:00  Patient On (Oxygen Delivery Method): room air        CBC Full  -  ( 13 Dec 2022 05:30 )  WBC Count : 26.60 K/uL  RBC Count : 2.37 M/uL  Hemoglobin : 7.1 g/dL  Hematocrit : 21.6 %  Platelet Count - Automated : 314 K/uL  Mean Cell Volume : 91.1 fL  Mean Cell Hemoglobin : 30.0 pg  Mean Cell Hemoglobin Concentration : 32.9 gm/dL  Auto Neutrophil # : 24.29 K/uL  Auto Lymphocyte # : 1.17 K/uL  Auto Monocyte # : 1.14 K/uL  Auto Eosinophil # : 0.00 K/uL  Auto Basophil # : 0.00 K/uL  Auto Neutrophil % : 91.3 %  Auto Lymphocyte % : 4.4 %  Auto Monocyte % : 4.3 %  Auto Eosinophil % : 0.0 %  Auto Basophil % : 0.0 %    12-13    145  |  113<H>  |  32<H>  ----------------------------<  115<H>  3.8   |  21<L>  |  0.85    Ca    8.3<L>      13 Dec 2022 05:30  Phos  3.4     12-13  Mg     2.20     12-13    TPro  6.0  /  Alb  2.6<L>  /  TBili  2.1<H>  /  DBili  1.1<H>  /  AST  37  /  ALT  41  /  AlkPhos  102  12-13    LIVER FUNCTIONS - ( 13 Dec 2022 05:30 )  Alb: 2.6 g/dL / Pro: 6.0 g/dL / ALK PHOS: 102 U/L / ALT: 41 U/L / AST: 37 U/L / GGT: x               MICROBIOLOGY:  .Body Fluid Synovial Fluid  12-12-22   No growth  --    polymorphonuclear leukocytes  No organisms seen  by cytocentrifuge      .Blood Blood-Venous  12-05-22   No Growth Final  --  --      .Blood Blood-Peripheral  12-02-22   No Growth Final  --  --      .Blood Blood-Peripheral  12-02-22   No Growth Final  --  --      Catheterized Catheterized  12-02-22   <10,000 CFU/mL Normal Urogenital Narda  --  --              v    Rapid RVP Result: NotDetec (12-12 @ 17:15)  Rapid RVP Result: NotDetec (12-07 @ 07:04)          RADIOLOGY     TAMI DUNHAM 52y [id number]    Patient is a 52y old  Male who presents with a chief complaint of Unresponsive (13 Dec 2022 09:45)      Follow Up/CC:  ID following for fever    Interval History/ROS: no fever, more awake    Allergies    No Known Allergies    Intolerances        ANTIMICROBIALS:      MEDICATIONS  (STANDING):  aspirin Suppository 300 milliGRAM(s) Rectal daily  bisacodyl Suppository 10 milliGRAM(s) Rectal daily  chlorhexidine 2% Cloths 1 Application(s) Topical daily  dextrose 5% + sodium chloride 0.9%. 1000 milliLiter(s) (50 mL/Hr) IV Continuous <Continuous>  dextrose 50% Injectable 25 Gram(s) IV Push once  dextrose 50% Injectable 12.5 Gram(s) IV Push once  dextrose 50% Injectable 25 Gram(s) IV Push once  dextrose Oral Gel 15 Gram(s) Oral once  diphtheria/tetanus/pertussis (acellular) Vaccine (Adacel) 0.5 milliLiter(s) IntraMuscular once  glucagon  Injectable 1 milliGRAM(s) IntraMuscular once  lacosamide IVPB 100 milliGRAM(s) IV Intermittent every 12 hours  levETIRAcetam  IVPB 1500 milliGRAM(s) IV Intermittent every 12 hours    MEDICATIONS  (PRN):  heparin   Injectable 8000 Unit(s) IV Push every 6 hours PRN For aPTT less than 40  heparin   Injectable 4000 Unit(s) IV Push every 6 hours PRN For aPTT between 40 - 57        Vital Signs Last 24 Hrs  T(C): 36.8 (13 Dec 2022 09:00), Max: 37.6 (12 Dec 2022 13:02)  T(F): 98.3 (13 Dec 2022 09:00), Max: 99.7 (12 Dec 2022 13:02)  HR: 99 (13 Dec 2022 09:00) (97 - 102)  BP: 120/74 (13 Dec 2022 09:00) (117/64 - 123/82)  BP(mean): --  RR: 19 (13 Dec 2022 09:00) (17 - 19)  SpO2: 98% (13 Dec 2022 09:00) (96% - 99%)    Parameters below as of 13 Dec 2022 09:00  Patient On (Oxygen Delivery Method): room air        CBC Full  -  ( 13 Dec 2022 05:30 )  WBC Count : 26.60 K/uL  RBC Count : 2.37 M/uL  Hemoglobin : 7.1 g/dL  Hematocrit : 21.6 %  Platelet Count - Automated : 314 K/uL  Mean Cell Volume : 91.1 fL  Mean Cell Hemoglobin : 30.0 pg  Mean Cell Hemoglobin Concentration : 32.9 gm/dL  Auto Neutrophil # : 24.29 K/uL  Auto Lymphocyte # : 1.17 K/uL  Auto Monocyte # : 1.14 K/uL  Auto Eosinophil # : 0.00 K/uL  Auto Basophil # : 0.00 K/uL  Auto Neutrophil % : 91.3 %  Auto Lymphocyte % : 4.4 %  Auto Monocyte % : 4.3 %  Auto Eosinophil % : 0.0 %  Auto Basophil % : 0.0 %    12-13    145  |  113<H>  |  32<H>  ----------------------------<  115<H>  3.8   |  21<L>  |  0.85    Ca    8.3<L>      13 Dec 2022 05:30  Phos  3.4     12-13  Mg     2.20     12-13    TPro  6.0  /  Alb  2.6<L>  /  TBili  2.1<H>  /  DBili  1.1<H>  /  AST  37  /  ALT  41  /  AlkPhos  102  12-13    LIVER FUNCTIONS - ( 13 Dec 2022 05:30 )  Alb: 2.6 g/dL / Pro: 6.0 g/dL / ALK PHOS: 102 U/L / ALT: 41 U/L / AST: 37 U/L / GGT: x               MICROBIOLOGY:  .Body Fluid Synovial Fluid  12-12-22   No growth  --    polymorphonuclear leukocytes  No organisms seen  by cytocentrifuge      .Blood Blood-Venous  12-05-22   No Growth Final  --  --      .Blood Blood-Peripheral  12-02-22   No Growth Final  --  --      .Blood Blood-Peripheral  12-02-22   No Growth Final  --  --      Catheterized Catheterized  12-02-22   <10,000 CFU/mL Normal Urogenital Narda  --  --              v    Rapid RVP Result: NotDetec (12-12 @ 17:15)  Rapid RVP Result: NotDetec (12-07 @ 07:04)          RADIOLOGY

## 2022-12-13 NOTE — CONSULT NOTE ADULT - SUBJECTIVE AND OBJECTIVE BOX
HPI:  TAMI DUNHAM is a 52 year old male with history of TIAs (2011, 2013), CVAs x3 (02/2022 s/p tPA, 8/3/2022 s/p tPA, 11/5/2022 with residual expressive aphasia) on Eliquis, and HLD BIBEMS after being found unresponsive at home.     Unable to obtain full HPI due to underlying mental status, as he is awake and attempts to answer questions however he provides inappropriate responses.  Patient initially hospitalized after being found down at home with hospital course c/b seizures, intubation for airway protection, partial SBO on CT imaging, fevers, anemia, and hyperbilirubinemia.  GI consulted for PEG evaluation.    ROS:   Unable to obtain full ROS due to underlying mental status.    PMHX/PSHX:    History of TIAs    CVA (cerebrovascular accident)    HLD (hyperlipidemia)    Vertigo    Unresponsiveness    No significant past surgical history      Allergies:  No Known Allergies      Home Medications: reviewed  Hospital Medications:  aspirin Suppository 300 milliGRAM(s) Rectal daily  bisacodyl Suppository 10 milliGRAM(s) Rectal daily  chlorhexidine 2% Cloths 1 Application(s) Topical daily  dextrose 5% + sodium chloride 0.9%. 1000 milliLiter(s) IV Continuous <Continuous>  dextrose 50% Injectable 25 Gram(s) IV Push once  dextrose 50% Injectable 12.5 Gram(s) IV Push once  dextrose 50% Injectable 25 Gram(s) IV Push once  dextrose Oral Gel 15 Gram(s) Oral once  diphtheria/tetanus/pertussis (acellular) Vaccine (Adacel) 0.5 milliLiter(s) IntraMuscular once  glucagon  Injectable 1 milliGRAM(s) IntraMuscular once  heparin   Injectable 8000 Unit(s) IV Push every 6 hours PRN  heparin   Injectable 4000 Unit(s) IV Push every 6 hours PRN  lacosamide IVPB 100 milliGRAM(s) IV Intermittent every 12 hours  levETIRAcetam  IVPB 1500 milliGRAM(s) IV Intermittent every 12 hours      Social History:   Unable to obtain due to underlying mental status.    Family history:    No pertinent family history in first degree relatives    No pertinent family history in first degree relatives        PHYSICAL EXAM:   Vital Signs:  Vital Signs Last 24 Hrs  T(C): 36.8 (13 Dec 2022 09:00), Max: 37.6 (12 Dec 2022 13:02)  T(F): 98.3 (13 Dec 2022 09:00), Max: 99.7 (12 Dec 2022 13:02)  HR: 99 (13 Dec 2022 09:00) (97 - 102)  BP: 120/74 (13 Dec 2022 09:00) (117/64 - 123/82)  BP(mean): --  RR: 19 (13 Dec 2022 09:00) (17 - 19)  SpO2: 98% (13 Dec 2022 09:00) (96% - 99%)    Parameters below as of 13 Dec 2022 09:00  Patient On (Oxygen Delivery Method): room air      Daily     Daily     GENERAL: no acute distress  NEURO: not fully alert/oriented  HEENT: NCAT, no conjunctival pallor appreciated  CHEST: no respiratory distress, no accessory muscle use  CARDIAC: regular rate, +S1/S2  ABDOMEN: soft, nontender, no rebound or guarding  EXTREMITIES: warm, well perfused  SKIN: no lesions noted    LABS: reviewed                        7.1    26.60 )-----------( 314      ( 13 Dec 2022 05:30 )             21.6     12-13    145  |  113<H>  |  32<H>  ----------------------------<  115<H>  3.8   |  21<L>  |  0.85    Ca    8.3<L>      13 Dec 2022 05:30  Phos  3.4     12-13  Mg     2.20     12-13    TPro  6.0  /  Alb  2.6<L>  /  TBili  2.1<H>  /  DBili  1.1<H>  /  AST  37  /  ALT  41  /  AlkPhos  102  12-13    LIVER FUNCTIONS - ( 13 Dec 2022 05:30 )  Alb: 2.6 g/dL / Pro: 6.0 g/dL / ALK PHOS: 102 U/L / ALT: 41 U/L / AST: 37 U/L / GGT: x             Culture - Body Fluid with Gram Stain (collected 12 Dec 2022 10:55)  Source: .Body Fluid Synovial Fluid  Gram Stain (12 Dec 2022 15:59):    polymorphonuclear leukocytes    No organisms seen    by cytocentrifuge  Preliminary Report (13 Dec 2022 09:54):    No growth        Diagnostic Studies: see sunrise for full report

## 2022-12-13 NOTE — CHART NOTE - NSCHARTNOTEFT_GEN_A_CORE
7.1    26.60 )-----------( 314      ( 13 Dec 2022 05:30 )             21.6     145  |  113<H>  |  32<H>  ----------------------------<  115<H>  3.8   |  21<L>  |  0.85    Ca    8.3<L>      13 Dec 2022 05:30  Phos  3.4     12-13  Mg     2.20     12-13    TPro  6.0  /  Alb  2.6<L>  /  TBili  2.1<H>  /  DBili  1.1<H>  /  AST  37  /  ALT  41  /  AlkPhos  102  12-13    Results and case discussed with Dr. Diez this morning, no signs of bleeding noted, per nurse had small brown BM, will need CBC later, GI re-consulted for further recs.  LP labs ordered as discussed with ID DR. Noel. Spoke with procedure team Caleb >unable to perform bedside LP.   Writer discussed case with Neuro IR Dr. Ventura this afternoon> accepted case likely plan LP for tomorrow early, ASA d/brina for now.     chuckie carrera iron studies, Consent obtained from parents in chart, GI recalled for posible scope---- 7.1    26.60 )-----------( 314      ( 13 Dec 2022 05:30 )             21.6     145  |  113<H>  |  32<H>  ----------------------------<  115<H>  3.8   |  21<L>  |  0.85    Ca    8.3<L>      13 Dec 2022 05:30  Phos  3.4     12-13  Mg     2.20     12-13    TPro  6.0  /  Alb  2.6<L>  /  TBili  2.1<H>  /  DBili  1.1<H>  /  AST  37  /  ALT  41  /  AlkPhos  102  12-13    Results and case discussed with Dr. Diez this morning, no signs of bleeding noted, per nurse had small brown BM, will need CBC later, GI re-consulted for further recs.  LP labs ordered as discussed with ID DR. Noel. Spoke with procedure team Caleb >unable to perform bedside LP.   Writer discussed case with Neuro IR Dr. Ventura this afternoon> accepted case likely plan LP for tomorrow early, ASA d/brina for now.     Blood transfusion consent obtained from patient's parents at bedside, discussed plan of care, all questions answered. Discussed plan with patient, RN and attending

## 2022-12-13 NOTE — CONSULT NOTE ADULT - ASSESSMENT
52-year-old male with a PMHx significant for TIAs (2011, 2013), CVAs x3 (02/2022 s/p tPA, 8/3/2022 s/p tPA, 11/5/2022 with residual expressive aphasia) on Eliquis, and HLD BIBEMS after being found unresponsive at home, w/ seizure like activity in the hospital.     PLAN:    -Pt w/ elevated t bili and cholelithiasis on US - can get MRCP if pt will tolerate to evaluate for choledocholithiasis  -no acute surgical intervention  -will follow    Discussed w/ Dr. Vaughn  A Surgery, [id number]

## 2022-12-13 NOTE — PROGRESS NOTE ADULT - SUBJECTIVE AND OBJECTIVE BOX
Name of Patient : TAMI DUNHAM  MRN: [id number]  Date of visit: 12-13-22       Subjective: Patient seen and examined. No new events except as noted.   more awake,     REVIEW OF SYSTEMS:    limited, no complains     MEDICATIONS:  MEDICATIONS  (STANDING):  bisacodyl Suppository 10 milliGRAM(s) Rectal daily  chlorhexidine 2% Cloths 1 Application(s) Topical daily  dextrose 5% + sodium chloride 0.9%. 1000 milliLiter(s) (50 mL/Hr) IV Continuous <Continuous>  dextrose 50% Injectable 25 Gram(s) IV Push once  dextrose 50% Injectable 12.5 Gram(s) IV Push once  dextrose 50% Injectable 25 Gram(s) IV Push once  dextrose Oral Gel 15 Gram(s) Oral once  diphtheria/tetanus/pertussis (acellular) Vaccine (Adacel) 0.5 milliLiter(s) IntraMuscular once  glucagon  Injectable 1 milliGRAM(s) IntraMuscular once  lacosamide IVPB 100 milliGRAM(s) IV Intermittent every 12 hours  levETIRAcetam  IVPB 1500 milliGRAM(s) IV Intermittent every 12 hours      PHYSICAL EXAM:  T(C): 36.6 (12-13-22 @ 17:00), Max: 36.8 (12-13-22 @ 09:00)  HR: 95 (12-13-22 @ 17:00) (93 - 99)  BP: 119/68 (12-13-22 @ 17:00) (117/71 - 122/70)  RR: 18 (12-13-22 @ 17:00) (17 - 19)  SpO2: 99% (12-13-22 @ 17:00) (98% - 99%)  Wt(kg): --  I&O's Summary        Appearance: awake   HEENT:  PERRLA   Lymphatic: No lymphadenopathy   Cardiovascular: Normal S1 S2, no JVD  Respiratory: normal effort , clear  Gastrointestinal:  Soft, Non-tender  Skin: No rashes,  warm to touch  Psychiatry:  Mood & affect appropriate  Musculuskeletal: No edema      All labs, Imaging and EKGs personally reviewed                           6.9    21.40 )-----------( 330      ( 13 Dec 2022 18:12 )             21.0               12-13    145  |  113<H>  |  32<H>  ----------------------------<  115<H>  3.8   |  21<L>  |  0.85    Ca    8.3<L>      13 Dec 2022 05:30  Phos  3.4     12-13  Mg     2.20     12-13    TPro  6.0  /  Alb  2.6<L>  /  TBili  2.1<H>  /  DBili  1.1<H>  /  AST  37  /  ALT  41  /  AlkPhos  102  12-13    PT/INR - ( 13 Dec 2022 18:12 )   PT: 14.7 sec;   INR: 1.26 ratio         PTT - ( 13 Dec 2022 18:12 )  PTT:20.4 sec       CARDIAC MARKERS ( 12 Dec 2022 06:05 )  x     / x     / 506 U/L / x     / x

## 2022-12-13 NOTE — PROGRESS NOTE ADULT - PROBLEM SELECTOR PLAN 4
Hx of TIAs in 2011 and 2013, CVAs x3 in 02/22, 8/22, and 11/22 with residual expressive aphasia    - was on ASA/Eliquis at home   Hgb too low and unstable  to start Eliquis at present time

## 2022-12-13 NOTE — PROGRESS NOTE ADULT - PROBLEM SELECTOR PLAN 1
found unresponsive at home   CTH with no acute findings  initially vEEG with no identified seizures  intubated 12/2, extubated 12/4  MRI H - right frontal subcortical and right centrum semiovale infarcts unchanged

## 2022-12-13 NOTE — CONSULT NOTE ADULT - ASSESSMENT
52 year old male with history of TIAs (2011, 2013), CVAs x3 (02/2022 s/p tPA, 8/3/2022 s/p tPA, 11/5/2022 with residual expressive aphasia) on Eliquis, and HLD BIBEMS after being found unresponsive at home. Patient initially hospitalized after being found down at home with hospital course c/b seizures, intubation for airway protection, partial SBO on CT imaging, fevers, anemia, and hyperbilirubinemia.     # PEG evaluation  # Fever  # Seizure  # History of multiple TIA/CVA on anticoagulation  # Normocytic anemia  # Elevated BUN  # Hemarthrosis  # Hyperbilirubinemia: abdominal US normal without biliary duct dilatation; DILI/HILI remain high on DDx given ABx while inpatient, antiepileptics, and supplements detailed below  # Reported supplements at home: reported testosterone, ashwagandha, and red wood at home    Recommendations:  -trend clinical symptoms, exam findings, vital signs, CBC, CMP, INR  -maintain active type and screen  -transfusion goal to maintain hemoglobin >/= 7.0 and platelets >/= 50  -rule out other causes for anemia [consider iron studies, ferritin, vitamin B12, folate, or hemolysis workup with haptoglobin, LDH, reticulocytes, peripheral blood smear], however complicated in the setting of severe illness during hospital course and hemarthrosis  -avoid NSAIDs  -PPI IV BID acceptable for now while anemia being worked up  -no acute plans for PEG/endoscopy/colonoscopy; patient not optimized given persistent fevers and significant leukocytosis; recommend complete infectious workup [lumbar puncture given fevers and AMS?]  -recommend cessation of herbal remedies and supplements  -if patient needs nutrition, recommend NGT for feeds while potentially reversible causes are addressed regarding his mental status    Note incomplete until finalized by attending signature/attestation.    Abdi Ramirez  GI/Hepatology Fellow    MONDAY-FRIDAY 8AM-5PM:  Pager# [phone] ([facility]) or [phone] ([facility])    NON-URGENT CONSULTS:  Please email [email or web address]  AT NIGHT AND ON WEEKENDS:  Contact on-call GI fellow via answering service ([phone]) from 5pm-8am and on weekends/holidays

## 2022-12-13 NOTE — PROGRESS NOTE ADULT - SUBJECTIVE AND OBJECTIVE BOX
Neurology Progress Note    S: Patient seen and examined , now on floor agitated at times in [facility]     Medication:  MEDICATIONS  (STANDING):  aspirin  chewable 81 milliGRAM(s) Oral daily  bisacodyl Suppository 10 milliGRAM(s) Rectal daily  chlorhexidine 2% Cloths 1 Application(s) Topical daily  dextrose 5% + sodium chloride 0.9%. 1000 milliLiter(s) (50 mL/Hr) IV Continuous <Continuous>  dextrose 50% Injectable 25 Gram(s) IV Push once  dextrose 50% Injectable 12.5 Gram(s) IV Push once  dextrose 50% Injectable 25 Gram(s) IV Push once  dextrose Oral Gel 15 Gram(s) Oral once  diphtheria/tetanus/pertussis (acellular) Vaccine (Adacel) 0.5 milliLiter(s) IntraMuscular once  glucagon  Injectable 1 milliGRAM(s) IntraMuscular once  lacosamide IVPB 100 milliGRAM(s) IV Intermittent every 12 hours  levETIRAcetam  IVPB 1500 milliGRAM(s) IV Intermittent every 12 hours    MEDICATIONS  (PRN):  heparin   Injectable 8000 Unit(s) IV Push every 6 hours PRN For aPTT less than 40  heparin   Injectable 4000 Unit(s) IV Push every 6 hours PRN For aPTT between 40 - 57    Vitals:  Vital Signs Last 24 Hrs  T(C): 36.7 (12-13-22 @ 04:54), Max: 38.4 (12-12-22 @ 10:44)  T(F): 98.1 (12-13-22 @ 04:54), Max: 101.1 (12-12-22 @ 10:44)  HR: 99 (12-13-22 @ 04:54) (97 - 107)  BP: 117/71 (12-13-22 @ 04:54) (117/64 - 133/85)  BP(mean): --  RR: 17 (12-13-22 @ 04:54) (17 - 18)  SpO2: 98% (12-13-22 @ 04:54) (96% - 99%)            General Exam:   General Appearance: Appropriately dressed and in no acute distress       Head: Normocephalic, atraumatic and no dysmorphic features  Ear, Nose, and Throat: Moist mucous membranes    CVS: S1S2+  Resp: No SOB, no wheeze or rhonchi  Abd: soft NTND  Extremities: No edema, no cyanosis  Skin: No bruises, no rashes     Neurological Exam:    Mental Status: Awake, Oriented x 2, followign some simple.  speaking more but slurred (+ tongue bite .    Cranial Nerves: PERRL, EOMI, VFFC, sensation V1-V3 intact,  no obvious facial asymmetry    Motor: Moving uppers > lowers. uppers at least 3-4/5 in mittens ; not moving LLE very well   Sensation:minimal withdrawal to noxious x 4  R>L   Reflexes: 1+ throughout at biceps, brachioradialis, triceps, patellars and ankles bilaterally and equal. No clonus. R toe and L toe were both downgoing.  Coordination: unable   Gait: unable     I personally reviewed the below data/images/labs:  CBC Full  -  ( 13 Dec 2022 05:30 )  WBC Count : 26.60 K/uL  RBC Count : 2.37 M/uL  Hemoglobin : 7.1 g/dL  Hematocrit : 21.6 %  Platelet Count - Automated : 314 K/uL  Mean Cell Volume : 91.1 fL  Mean Cell Hemoglobin : 30.0 pg  Mean Cell Hemoglobin Concentration : 32.9 gm/dL  Auto Neutrophil # : x  Auto Lymphocyte # : x  Auto Monocyte # : x  Auto Eosinophil # : x  Auto Basophil # : x  Auto Neutrophil % : x  Auto Lymphocyte % : x  Auto Monocyte % : x  Auto Eosinophil % : x  Auto Basophil % : x    12-13    145  |  113<H>  |  32<H>  ----------------------------<  115<H>  3.8   |  21<L>  |  0.85    Ca    8.3<L>      13 Dec 2022 05:30  Phos  3.4     12-13  Mg     2.20     12-13    TPro  6.0  /  Alb  2.6<L>  /  TBili  2.1<H>  /  DBili  1.1<H>  /  AST  37  /  ALT  41  /  AlkPhos  102  12-13      < from: CT Head No Cont (12.02.22 @ 20:27) >    ACC: [id number] EXAM:  CT CERVICAL SPINE                        ACC: [id number] EXAM:  CT MAXILLOFACIAL                        ACC: [id number] EXAM:  CT BRAIN                          PROCEDURE DATE:  12/02/2022        INTERPRETATION:  CLINICAL INDICATION: Trauma.    Technique: Noncontrast axial CT of the head, facial bones, and cervical   spine was performed. 3-D reformats of the facial bones was obtained.   Coronal and sagittal reformats were obtained.      COMPARISON: None.    FINDINGS:  Head CT:  The ventricles and sulci are within normal limits. 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 There is no intraparenchymal hematoma, mass effect   or midline shift. No abnormal extra-axial fluid collections or   hemorrhages are present.    There is swelling of the left lateral scalp. The calvarium is intact.   There are scattered mucosal inflammatory changes in the paranasal sinuses.      Cervical spine CT:  Alignment ismaintained. Vertebral bodies are normal in height, without   evidence of fracture or dislocation. Prevertebral soft tissues are within   normal limits without soft tissue swelling or hematoma.    Intervertebral discs are intact. Neural foramina and spinal canal are   intact.    The visualized lung apices are within normal limits.      Facial bone CT:    No acute facial fracture.    There are scattered mucosal inflammatory changes in the paranasal   sinuses. Mastoid air cells are clear.    Soft tissues appear unremarkable.        IMPRESSION:  CT HEAD: No acute abnormality.  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  CT CERVICAL SPINE: No acute abnormality  CT FACIAL BONE: No acute abnormality    --- End of Report ---       DELGADO IVAN MD; Attending Radiologist  This document has been electronically signed. Dec  2 2022  9:02PM    < end of copied text >  < from: CT Lumbar Spine No Cont (12.02.22 @ 20:27) >    ACC: [id number] EXAM:  CT LUMBAR SPINE                        ACC: [id number] EXAM:  CT THORACIC SPINE                          PROCEDURE DATE:  12/02/2022          INTERPRETATION:  CT thoracic and lumbar spine without IV contrast    CLINICAL INFORMATION:  Trauma  Back pain, fracture.    TECHNIQUE:  Contiguous axial 2 mm sections were obtained through the   thoracic and lumbar spine using a single helical acquisition.     Additional 2 mm sagittal and coronal reconstructions of the spine were   obtained. These additional reformatted images were used to evaluate the   spine for alignment, vertebral fractures and the integrity of the the   posterior elements.   This scan was performed using automatic exposure   control (radiation dose reduction software) to obtain a diagnostic image   quality scan with patient dose as low as reasonably achievable.    FINDINGS:   No prior similar studies are available for review    Thoracic and lumbar vertebral body heights are maintained. No vertebral   fracture is seen. No destructive bone lesion is found.  Alignment is   preserved.  Facet joints appear intact and aligned.    Thoracic and lumbar intervertebral disc spaces appear intact.   Degenerative disc disease and spondylosis is noted at T6-T7 throughL4-5   with loss of disc height and associated degenerative endplate changes.   Mild disc bulges at L2-3 through L4-5 flatten the ventral thecal sac and   narrow the BILATERAL neural foramina.    No paraspinal mass is recognized.  Paraspinal soft tissues appear intact.      IMPRESSION:  Degenerative disc disease and spondylosis is noted at T6-T7   through L4-5 with loss of disc height and associated degenerative   endplate changes. Mild disc bulges at L2-3 through L4-5 flatten the   ventral thecal sac and narrow the BILATERAL neural foramina   No   vertebral fracture is recognized.    --- End of Report ---       ANGIE ESCOBAR MD; Attending Radiologist  This document has been electronically signed. Dec  2 2022  8:55PM    < end of copied text >    EEG Classification / Summary:  Abnormal EEG in a comatose patient due to diffuse suppression gradually improving to discontinuous background, with right hemispheric relative attenuation/suppression. No epileptiform abnormalities or seizures are captured.    Clinical Impression:  Severe diffuse cerebral dysfunction that gradually improves is nonspecific in etiology. In this case, it may be related to sedating medication (propofol) with improvement after decreasing and stopping the medication.  Right hemispheric focal cerebral dysfunction can be structural or functional in etiology.      12/7  Clinical Impression:  -Occasional runs of right frontal lateralized periodic discharges (LPDs) at up to 1.3 Hz indicate a potentially epileptogenic focus in the right frontal region and are on the ictal-interictal continuum.  -A pattern on the ictal-interictal continuum is a pattern that does not qualify as an electrographic seizure or electrographic status epilepticus, but there is a reasonable chance that it may be contributing to impaired alertness, causing other clinical symptoms, and/or contributing to neuronal injury.  -Right hemispheric relative attenuation indicates right hemispheric focal cerebral dysfunction, which can be structural or functional in etiology.  -Rare left frontal epileptiform discharges indicate a potentially epileptogenic focus in this reigon  -Severe diffuse slowing and GRDA indicate severe diffuse cerebral dysfunction of nonspecific etiology.  -No electrographic seizures are captured.     < from: MR Head w/wo IV Cont (12.09.22 @ 19:54) >    ACC: [id number] EXAM:  MR BRAIN WAW IC                          PROCEDURE DATE:  12/09/2022          INTERPRETATION:  CLINICAL INDICATION: CVA, found unresponsive at home      Magnetic resonance imaging of the brain was carried out with transaxial   SPGR, FLAIR, fast spin echo T2 weighted images, axial susceptibility   weighted series, diffusion weighted series and sagittal T1 weighted   series on a 1.5 Maritza magnet. Post contrast axial, coronal and sagittal   T1 weighted images were obtained. 10cc of Gadavist were intravenously   injected, 0 cc were discarded.      Comparison is made with the prior brain CT of 12/5/2022 and MRI 11/5/2022.    Mild ventricular and sulcal prominence is consistent with moderate   atrophy for the patient's age. No acute hemorrhage is identified. There   is a focus of hemosiderin within the alexis which is calcified on CT.   Scattered additional foci of hemosiderin deposition are identified in the   left frontal subcortical white matter and right occipital cortex is   nonspecific but may be related to multiple cavernoma is or hypertension.    There is a tiny focus of diffusion restriction in the right frontal   subcortical white matter and right centrum semiovale which are unchanged   since the prior exam and may represent subacute infarcts. Multiplicity   infarcts may be related to hypercoagulable state or cardioembolic events.    After contrast administration there is normal intracranial vascular   enhancement. No abnormal parenchymal or leptomeningeal enhancement.      IMPRESSION: Atrophy for the patient's age. Right frontal subcortical and   right centrum semiovale punctate infarcts are unchanged since 11/5/2022   and likely represent subacute infarcts. No abnormal enhancement. Focus of   calcification in the alexis with old hemosiderin. A few additional   scattered foci of hemosiderin deposition are unchanged.    --- End of Report ---            JUAN MANUEL CASTILLO MD; Attending Radiologist  This document has been electronically signed. Dec  9 2022  8:05PM    < end of copied text >

## 2022-12-13 NOTE — PROGRESS NOTE ADULT - ASSESSMENT
52-year-old  M known to me from outpatient setting with  TIAs (2011, 2013), Strokes x3 (02/2022 s/p tPA, 8/3/2022 s/p tPA, 11/5/2022 with residual expressive aphasia) on Eliquis, was on testosterone outpatient stopped after last stroke, HLD BIBEMS after being found unresponsive  Temp 105.4 on arrival tachy and /110. 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   CT cervical spine and maxillofacial scans negative.  CT chest, abdomen, and pelvis w/ contrast concerning for possible partial SBO without transition point. Surgery consulted in ED, no acute surgical intervention at this time.   CT thoracic and lumbar spine showing degenerative disc disease T6-T7 through L4-L5 and mild disc bulges at L2-L3 through L4-L5 that flatten the ventral thecal sac and narrowing of the bilateral neural foramina.  MRI 11/2022: R centrum semiovale and R posterior frontal infarcts   takes adderall at home   + rhabdo  EEG no seizures   + transaminitis   CTH 12/5 stable   outpatient hypercoag workup neg   12/5 extubated then reintibuated for seizure activity and tx back to ICU   EEG this AM 12/5 with only slowing   EEG 12/6 slowing but no seizures   spoke with Pippa Conti (friend) who states after he was taken off the testosterone he ordered a mail order testosterone supplement, unclear if he started it yet, unclear what this was  12/7 EEG no electrogtraphic seizures captured but R LPDs, rare L frontal discharges, severe GRDA.   12/8 EEG improved.  extubated. following some commands   12/9 moving uppers> lowers   12/11 AAOx2 uppers 4-5/5; not moving LLE well   MRI brain neg for new stroke   s/p L knee tap arthrocentesis  by ortho on 12/12   spoke with friend pippa conti - she counted his adderrall and didn't take extra tables. did find ashwagandha and red wood (bottle was sealed) supplement in his apartment   o/e 12/13 AAOx2, slurred but 2/2 tongue bite   Impression:   1) AMS of unclear etiology, possible now seizure, related to adderral withdrawal? 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 had MIMI was question of PFO but not found. s/p ILR     - heparin held today for planning for LP   - ortho for L knee s/p tap / arthrocentesis   - started  Vimpat 100mg BID    - keppra 1500mg BID.   - fever on arrival, treatred initially for meningitis.  was on abx for aspiration PNA.  would consider LP at this point ; unclear etiology of initial fever planned for 12/13   - monitor LFTs , downtrending   - IVF  - CPK elevated. trend   - would consider endocrine   - there was some concern outpatient he may have a mixed  connective tissue disorder   - statin therapy for secondary stroke prevention when LFTS and CPK improve   - on heparin drip    - telemetry  - PT/OT/SS/SLP, OOBC  - check FS, glucose control <180  - GI/DVT ppx  - Thank you for allowing me to participate in the care of this patient. Call with questions.   - spoke with Pippa Conti at [phone] for more collateral info and to provide update. spoke again 12/12 over phone.   - spoke with friend at bedside, [place] 12/11   Braxton Forde MD  Vascular Neurology  Office: [phone]

## 2022-12-13 NOTE — CHART NOTE - NSCHARTNOTEFT_GEN_A_CORE
IPT consulted for lumbar puncture. Pt seen and evaluated at bedside, unable to follow commands and requiring mittens for agitation/pulling at lines. Pt unable to remain still and remain in safe positioning during exam. Given patient's altered mental status and inability to cooperate with bedside procedure, would recommend neuroradiology consult for lumbar puncture to be performed under anesthesia. Please page or reconsult with any questions or concerns.    Caleb Argueta  EM/IM PGY5  Invasive Procedure Team  [id number]

## 2022-12-14 LAB
ALBUMIN SERPL ELPH-MCNC: 2.6 G/DL — LOW (ref 3.3–5)
ALP SERPL-CCNC: 110 U/L — SIGNIFICANT CHANGE UP (ref 40–120)
ALT FLD-CCNC: 47 U/L — HIGH (ref 4–41)
ANION GAP SERPL CALC-SCNC: 10 MMOL/L — SIGNIFICANT CHANGE UP (ref 7–14)
ANION GAP SERPL CALC-SCNC: 12 MMOL/L — SIGNIFICANT CHANGE UP (ref 7–14)
APTT BLD: 23.1 SEC — LOW (ref 27–36.3)
AST SERPL-CCNC: 49 U/L — HIGH (ref 4–40)
BASOPHILS # BLD AUTO: 0.07 K/UL — SIGNIFICANT CHANGE UP (ref 0–0.2)
BASOPHILS # BLD AUTO: 0.11 K/UL — SIGNIFICANT CHANGE UP (ref 0–0.2)
BASOPHILS # BLD AUTO: 0.12 K/UL — SIGNIFICANT CHANGE UP (ref 0–0.2)
BASOPHILS NFR BLD AUTO: 0.4 % — SIGNIFICANT CHANGE UP (ref 0–2)
BASOPHILS NFR BLD AUTO: 0.5 % — SIGNIFICANT CHANGE UP (ref 0–2)
BASOPHILS NFR BLD AUTO: 0.6 % — SIGNIFICANT CHANGE UP (ref 0–2)
BILIRUB DIRECT SERPL-MCNC: 1.1 MG/DL — HIGH (ref 0–0.3)
BILIRUB INDIRECT FLD-MCNC: 1.2 MG/DL — HIGH (ref 0–1)
BILIRUB SERPL-MCNC: 2.3 MG/DL — HIGH (ref 0.2–1.2)
BILIRUB SERPL-MCNC: 2.3 MG/DL — HIGH (ref 0.2–1.2)
BUN SERPL-MCNC: 30 MG/DL — HIGH (ref 7–23)
BUN SERPL-MCNC: 32 MG/DL — HIGH (ref 7–23)
CALCIUM SERPL-MCNC: 8.2 MG/DL — LOW (ref 8.4–10.5)
CALCIUM SERPL-MCNC: 8.2 MG/DL — LOW (ref 8.4–10.5)
CHLORIDE SERPL-SCNC: 117 MMOL/L — HIGH (ref 98–107)
CHLORIDE SERPL-SCNC: 120 MMOL/L — HIGH (ref 98–107)
CK SERPL-CCNC: 594 U/L — HIGH (ref 30–200)
CO2 SERPL-SCNC: 22 MMOL/L — SIGNIFICANT CHANGE UP (ref 22–31)
CO2 SERPL-SCNC: 23 MMOL/L — SIGNIFICANT CHANGE UP (ref 22–31)
CREAT SERPL-MCNC: 0.7 MG/DL — SIGNIFICANT CHANGE UP (ref 0.5–1.3)
CREAT SERPL-MCNC: 0.77 MG/DL — SIGNIFICANT CHANGE UP (ref 0.5–1.3)
EGFR: 108 ML/MIN/1.73M2 — SIGNIFICANT CHANGE UP
EGFR: 111 ML/MIN/1.73M2 — SIGNIFICANT CHANGE UP
EOSINOPHIL # BLD AUTO: 0.59 K/UL — HIGH (ref 0–0.5)
EOSINOPHIL # BLD AUTO: 0.62 K/UL — HIGH (ref 0–0.5)
EOSINOPHIL # BLD AUTO: 0.8 K/UL — HIGH (ref 0–0.5)
EOSINOPHIL NFR BLD AUTO: 3 % — SIGNIFICANT CHANGE UP (ref 0–6)
EOSINOPHIL NFR BLD AUTO: 3.1 % — SIGNIFICANT CHANGE UP (ref 0–6)
EOSINOPHIL NFR BLD AUTO: 3.7 % — SIGNIFICANT CHANGE UP (ref 0–6)
FERRITIN SERPL-MCNC: 692 NG/ML — HIGH (ref 30–400)
FOLATE SERPL-MCNC: 11.1 NG/ML — SIGNIFICANT CHANGE UP (ref 3.1–17.5)
GLUCOSE BLDC GLUCOMTR-MCNC: 101 MG/DL — HIGH (ref 70–99)
GLUCOSE BLDC GLUCOMTR-MCNC: 112 MG/DL — HIGH (ref 70–99)
GLUCOSE BLDC GLUCOMTR-MCNC: 122 MG/DL — HIGH (ref 70–99)
GLUCOSE BLDC GLUCOMTR-MCNC: 123 MG/DL — HIGH (ref 70–99)
GLUCOSE BLDC GLUCOMTR-MCNC: 124 MG/DL — HIGH (ref 70–99)
GLUCOSE BLDC GLUCOMTR-MCNC: 124 MG/DL — HIGH (ref 70–99)
GLUCOSE SERPL-MCNC: 101 MG/DL — HIGH (ref 70–99)
GLUCOSE SERPL-MCNC: 124 MG/DL — HIGH (ref 70–99)
HCT VFR BLD CALC: 23.1 % — LOW (ref 39–50)
HCT VFR BLD CALC: 24.6 % — LOW (ref 39–50)
HCT VFR BLD CALC: 28.2 % — LOW (ref 39–50)
HGB BLD-MCNC: 7.7 G/DL — LOW (ref 13–17)
HGB BLD-MCNC: 8 G/DL — LOW (ref 13–17)
HGB BLD-MCNC: 9.2 G/DL — LOW (ref 13–17)
IANC: 15.58 K/UL — HIGH (ref 1.8–7.4)
IANC: 16.07 K/UL — HIGH (ref 1.8–7.4)
IANC: 17.21 K/UL — HIGH (ref 1.8–7.4)
IMM GRANULOCYTES NFR BLD AUTO: 2 % — HIGH (ref 0–0.9)
IMM GRANULOCYTES NFR BLD AUTO: 2.1 % — HIGH (ref 0–0.9)
IMM GRANULOCYTES NFR BLD AUTO: 2.4 % — HIGH (ref 0–0.9)
INR BLD: 1.19 RATIO — HIGH (ref 0.88–1.16)
IRON SATN MFR SERPL: 35 % — SIGNIFICANT CHANGE UP (ref 14–50)
IRON SATN MFR SERPL: 49 UG/DL — SIGNIFICANT CHANGE UP (ref 45–165)
LYMPHOCYTES # BLD AUTO: 1.09 K/UL — SIGNIFICANT CHANGE UP (ref 1–3.3)
LYMPHOCYTES # BLD AUTO: 1.11 K/UL — SIGNIFICANT CHANGE UP (ref 1–3.3)
LYMPHOCYTES # BLD AUTO: 1.26 K/UL — SIGNIFICANT CHANGE UP (ref 1–3.3)
LYMPHOCYTES # BLD AUTO: 5.4 % — LOW (ref 13–44)
LYMPHOCYTES # BLD AUTO: 5.7 % — LOW (ref 13–44)
LYMPHOCYTES # BLD AUTO: 5.8 % — LOW (ref 13–44)
MAGNESIUM SERPL-MCNC: 2.3 MG/DL — SIGNIFICANT CHANGE UP (ref 1.6–2.6)
MCHC RBC-ENTMCNC: 29.7 PG — SIGNIFICANT CHANGE UP (ref 27–34)
MCHC RBC-ENTMCNC: 29.9 PG — SIGNIFICANT CHANGE UP (ref 27–34)
MCHC RBC-ENTMCNC: 30.2 PG — SIGNIFICANT CHANGE UP (ref 27–34)
MCHC RBC-ENTMCNC: 32.5 GM/DL — SIGNIFICANT CHANGE UP (ref 32–36)
MCHC RBC-ENTMCNC: 32.6 GM/DL — SIGNIFICANT CHANGE UP (ref 32–36)
MCHC RBC-ENTMCNC: 33.3 GM/DL — SIGNIFICANT CHANGE UP (ref 32–36)
MCV RBC AUTO: 90.6 FL — SIGNIFICANT CHANGE UP (ref 80–100)
MCV RBC AUTO: 91 FL — SIGNIFICANT CHANGE UP (ref 80–100)
MCV RBC AUTO: 91.8 FL — SIGNIFICANT CHANGE UP (ref 80–100)
MONOCYTES # BLD AUTO: 1.67 K/UL — HIGH (ref 0–0.9)
MONOCYTES # BLD AUTO: 1.72 K/UL — HIGH (ref 0–0.9)
MONOCYTES # BLD AUTO: 2 K/UL — HIGH (ref 0–0.9)
MONOCYTES NFR BLD AUTO: 8.6 % — SIGNIFICANT CHANGE UP (ref 2–14)
MONOCYTES NFR BLD AUTO: 8.6 % — SIGNIFICANT CHANGE UP (ref 2–14)
MONOCYTES NFR BLD AUTO: 9.2 % — SIGNIFICANT CHANGE UP (ref 2–14)
NEUTROPHILS # BLD AUTO: 15.58 K/UL — HIGH (ref 1.8–7.4)
NEUTROPHILS # BLD AUTO: 16.07 K/UL — HIGH (ref 1.8–7.4)
NEUTROPHILS # BLD AUTO: 17.21 K/UL — HIGH (ref 1.8–7.4)
NEUTROPHILS NFR BLD AUTO: 78.7 % — HIGH (ref 43–77)
NEUTROPHILS NFR BLD AUTO: 79.9 % — HIGH (ref 43–77)
NEUTROPHILS NFR BLD AUTO: 80.3 % — HIGH (ref 43–77)
NRBC # BLD: 0 /100 WBCS — SIGNIFICANT CHANGE UP (ref 0–0)
NRBC # FLD: 0 K/UL — SIGNIFICANT CHANGE UP (ref 0–0)
NRBC # FLD: 0.02 K/UL — HIGH (ref 0–0)
NRBC # FLD: 0.02 K/UL — HIGH (ref 0–0)
PHOSPHATE SERPL-MCNC: 4.1 MG/DL — SIGNIFICANT CHANGE UP (ref 2.5–4.5)
PLATELET # BLD AUTO: 323 K/UL — SIGNIFICANT CHANGE UP (ref 150–400)
PLATELET # BLD AUTO: 338 K/UL — SIGNIFICANT CHANGE UP (ref 150–400)
PLATELET # BLD AUTO: 342 K/UL — SIGNIFICANT CHANGE UP (ref 150–400)
POTASSIUM SERPL-MCNC: 3.7 MMOL/L — SIGNIFICANT CHANGE UP (ref 3.5–5.3)
POTASSIUM SERPL-MCNC: 3.9 MMOL/L — SIGNIFICANT CHANGE UP (ref 3.5–5.3)
POTASSIUM SERPL-SCNC: 3.7 MMOL/L — SIGNIFICANT CHANGE UP (ref 3.5–5.3)
POTASSIUM SERPL-SCNC: 3.9 MMOL/L — SIGNIFICANT CHANGE UP (ref 3.5–5.3)
PROT SERPL-MCNC: 6 G/DL — SIGNIFICANT CHANGE UP (ref 6–8.3)
PROTHROM AB SERPL-ACNC: 13.8 SEC — HIGH (ref 10.5–13.4)
RBC # BLD: 2.55 M/UL — LOW (ref 4.2–5.8)
RBC # BLD: 2.68 M/UL — LOW (ref 4.2–5.8)
RBC # BLD: 3.1 M/UL — LOW (ref 4.2–5.8)
RBC # FLD: 15 % — HIGH (ref 10.3–14.5)
RBC # FLD: 15 % — HIGH (ref 10.3–14.5)
RBC # FLD: 15.1 % — HIGH (ref 10.3–14.5)
SODIUM SERPL-SCNC: 151 MMOL/L — HIGH (ref 135–145)
SODIUM SERPL-SCNC: 153 MMOL/L — HIGH (ref 135–145)
TIBC SERPL-MCNC: 142 UG/DL — LOW (ref 220–430)
UIBC SERPL-MCNC: 93 UG/DL — LOW (ref 110–370)
WBC # BLD: 19.41 K/UL — HIGH (ref 3.8–10.5)
WBC # BLD: 20.1 K/UL — HIGH (ref 3.8–10.5)
WBC # BLD: 21.84 K/UL — HIGH (ref 3.8–10.5)
WBC # FLD AUTO: 19.41 K/UL — HIGH (ref 3.8–10.5)
WBC # FLD AUTO: 20.1 K/UL — HIGH (ref 3.8–10.5)
WBC # FLD AUTO: 21.84 K/UL — HIGH (ref 3.8–10.5)

## 2022-12-14 PROCEDURE — 93306 TTE W/DOPPLER COMPLETE: CPT | Mod: 26

## 2022-12-14 RX ORDER — SODIUM CHLORIDE 9 MG/ML
1000 INJECTION, SOLUTION INTRAVENOUS
Refills: 0 | Status: DISCONTINUED | OUTPATIENT
Start: 2022-12-14 | End: 2022-12-14

## 2022-12-14 RX ORDER — DIPHENHYDRAMINE HCL 50 MG
25 CAPSULE ORAL ONCE
Refills: 0 | Status: COMPLETED | OUTPATIENT
Start: 2022-12-14 | End: 2022-12-14

## 2022-12-14 RX ORDER — SODIUM CHLORIDE 9 MG/ML
1000 INJECTION, SOLUTION INTRAVENOUS
Refills: 0 | Status: DISCONTINUED | OUTPATIENT
Start: 2022-12-14 | End: 2022-12-17

## 2022-12-14 RX ADMIN — Medication 1 MILLIGRAM(S): at 22:15

## 2022-12-14 RX ADMIN — SODIUM CHLORIDE 60 MILLILITER(S): 9 INJECTION, SOLUTION INTRAVENOUS at 12:19

## 2022-12-14 RX ADMIN — CHLORHEXIDINE GLUCONATE 1 APPLICATION(S): 213 SOLUTION TOPICAL at 11:40

## 2022-12-14 RX ADMIN — LACOSAMIDE 120 MILLIGRAM(S): 50 TABLET ORAL at 17:34

## 2022-12-14 RX ADMIN — LEVETIRACETAM 400 MILLIGRAM(S): 250 TABLET, FILM COATED ORAL at 06:18

## 2022-12-14 RX ADMIN — LEVETIRACETAM 400 MILLIGRAM(S): 250 TABLET, FILM COATED ORAL at 17:39

## 2022-12-14 RX ADMIN — LACOSAMIDE 120 MILLIGRAM(S): 50 TABLET ORAL at 06:19

## 2022-12-14 RX ADMIN — Medication 25 MILLIGRAM(S): at 00:29

## 2022-12-14 NOTE — PROGRESS NOTE ADULT - ASSESSMENT
52-year-old male with a PMHx significant for TIAs (2011, 2013), CVAs x3 (02/2022 s/p tPA, 8/3/2022 s/p tPA, 11/5/2022 with residual expressive aphasia) on Eliquis, and HLD BIBEMS after being found unresponsive at home on the floor, last known well 12/1 at around noon. C-collar was placed. Patient was intubated in the ED for airway protection. CTH with no acute findings, vEEG with no identified seizures. Patient was weaned off pressors, extubated, and transitioned to floors 12/4/22. 12/5 am RRT called for seizure like activity with urinary incontinence, tongue biting, and R>L posturing, patient s/p ativan 2mg x3. Anesthesia called to RRT for intubation. MICU accepting patient for seizure like activity requiring intubation.      #AMS, Seizure like activity  EEG 12/7: concern for epileptiform activity; though EEG from 12/5 without such abnormalities.   - f/u neuro recs  - Patient found down and unresponsive at home on 12/2, last known well 12/1 at around noon. Intubated in ED 12/2, extubated in MICU 12/4  - RRT 12/5: seizure like acting with urinary incontinence, convulsions, and tongue biting; s/p ativan 2mg x3 with pauses in seizure activity following each; intubated for airway protection with rocuronium. Prolactin ~75   - CTH and CT cervical spine 12/2 negative for any acute pathologies. Tox screen on admission negative. 12/5: Stable exam from prior CT head, chronic microvascular ischemic changes, parenchymal loss, dystrophic calcification of central portion of alexis unchanged.  - Video EEG 12/3-12/4 without any seizure like activity, moderate to severe nonspecific diffuse or multifocal cerebral dysfunction  - propofol off since 3am 12/6  - c/w keppra 1500mg q12 maintenance, Keppra loaded 4.5g  - patient extubated again on 12/8th  -MRI noted, chronci CVA, no acute CVA noted         #CVAs/TIAs  - Hx of TIAs in 2011 and 2013, CVAs x3 in 02/22, 8/22, and 11/22 with residual expressive aphasia   - on Eliquis and Aspirin at home for hx of stroke  - PFO work up in past negative, no evidence of PFO on MIMI X2  - c/w ASA   - c/w heparin gtt  - Restart Statin when LFTs and CPK improve per neurology  - Being worked up for Mixed Connective Tissue Disease OP- f/u p-ANCA, c-ANCA/ [id number] negative  - Neuro recs appreciated    # Hypernatremia  started on IV hyration  speech and swallow, possible to start oral hydration   tredn Na level     #Thecal sac flattening   - CT thoracic and lumbar spine showing degenerative disc disease T6-T7 through L4-L5 and mild disc bulges at L2-L3 through L4-L5 that flatten the ventral thecal sac and narrowing of the bilateral neural foramina  - Will Monitor for now, will consider neuro/neurosurg evaluation in the future    #HLD  - atorvastatin on hold due to elevated CK and LFTs  - restart when possible for secondary stroke prevention    #?ASD/PFO  - Patient reportedly found to have a PFO in February 2022 during a stroke workup at [place]. Patient presented for a PFO closure in November 2022. Notably, no PFO was found at the time and no intervention was performed by Dr. Lynn.    - TTE 11/5/22 EF 57% and grossly normal LV function  - MIMI performed bedside 12/5 1 of 2 studies (+) on bubble study (uploaded to [facility])        #Rhabdomyolysis  - CK 8720 on admission, peaked at 15k, now downtrending  - DDx: prolonged downtime myositis v hyperthermia? (T 105.4 F) v sepsis v seizure induced        # Elevated Winston level  CHeck Abd US noted    GI eval PRN   Trend LFTs       #Partial SBO - resolved  - CT chest, abdomen, and pelvis w/ contrast concerning for possible partial SBO without transition point.  - Surgery consulted, no acute intervention at this time  - 12/5 KUB - negative for ileus or SBO  - Hold TF for 12/7 for possible trial of extubation.  - otherwise diet per nutrition      #Leukocytosis  - worsening leukocytosis  - monitor for fever  - Ortho eval fro knee swelling   - Pan Cx  - plan for LP by IR         #Concern for sepsis  Unclear source, aspiration v less likely meningitis   BCx (12/2 NGTD repeat 12/5 NGTD) and UC 12/2 NGTD  - Patient initially presented with AMS, T 105.4, tachycardic, and tachypneic   - UA negative  - s/p vanco and zosyn x1 in ED 12/2, ceftriaxone 2g BID 12/3-12/4, vanco 12/3-12/4  - 12/7 DCed Vanc since BCx from 12/5 NGTD  - c/w zosyn for aspiration pna presumed. completed course, monitor   - ID eval appreciated  - febrile again, Ortho for knee asp      # ANemia  noted, no gross bleeding  type and screen  GI eval

## 2022-12-14 NOTE — PROGRESS NOTE ADULT - SUBJECTIVE AND OBJECTIVE BOX
Subjective: Patient seen and examined. No new events except as noted.     REVIEW OF SYSTEMS:    CONSTITUTIONAL:+ weakness, fevers or chills  EYES/ENT: No visual changes;  No vertigo or throat pain   NECK: No pain or stiffness  RESPIRATORY: No cough, wheezing, hemoptysis; No shortness of breath  CARDIOVASCULAR: No chest pain or palpitations  GASTROINTESTINAL: No abdominal or epigastric pain. No nausea, vomiting, or hematemesis; No diarrhea or constipation. No melena or hematochezia.  GENITOURINARY: No dysuria, frequency or hematuria  NEUROLOGICAL: No numbness or weakness  SKIN: No itching, burning, rashes, or lesions   All other review of systems is negative unless indicated above.    MEDICATIONS:  MEDICATIONS  (STANDING):  bisacodyl Suppository 10 milliGRAM(s) Rectal daily  chlorhexidine 2% Cloths 1 Application(s) Topical daily  dextrose 5%. 1000 milliLiter(s) (60 mL/Hr) IV Continuous <Continuous>  dextrose 50% Injectable 25 Gram(s) IV Push once  dextrose 50% Injectable 12.5 Gram(s) IV Push once  dextrose 50% Injectable 25 Gram(s) IV Push once  dextrose Oral Gel 15 Gram(s) Oral once  diphtheria/tetanus/pertussis (acellular) Vaccine (Adacel) 0.5 milliLiter(s) IntraMuscular once  glucagon  Injectable 1 milliGRAM(s) IntraMuscular once  lacosamide IVPB 100 milliGRAM(s) IV Intermittent every 12 hours  levETIRAcetam  IVPB 1500 milliGRAM(s) IV Intermittent every 12 hours      PHYSICAL EXAM:  T(C): 36.9 (12-14-22 @ 06:05), Max: 37.1 (12-14-22 @ 02:28)  HR: 83 (12-14-22 @ 06:05) (82 - 98)  BP: 121/68 (12-14-22 @ 06:05) (119/68 - 136/80)  RR: 17 (12-14-22 @ 06:05) (17 - 18)  SpO2: 99% (12-14-22 @ 06:05) (98% - 99%)  Wt(kg): --  I&O's Summary        Appearance: NAD  HEENT: dry oral mucosa, PERRL, EOMI - L temporal abrasion	  Lymphatic: No lymphadenopathy , no edema  Cardiovascular: Normal S1 S2, No JVD, No murmurs , Peripheral pulses palpable 2+ bilaterally  Respiratory: Coarse BS	  Gastrointestinal:  Soft, Non-tender, + BS	  Skin: No rashes, + ecchymoses, No cyanosis, warm to touch - BL mittes  Neurological Exam:    Mental Status: Awake, Oriented x 1-2, followign some simple. minimal verbal saying few words,   Cranial Nerves: PERRL, EOMI, VFFC, sensation V1-V3 intact,  no obvious facial asymmetry    Motor: Moving uppers > lowers. uppers at least 3-4/5 in mittens   Sensation:minimal withdrawal to noxious x 4  R>L   Reflexes: 1+ throughout at biceps, brachioradialis, triceps, patellars and ankles bilaterally and equal. No clonus. R toe and L toe were both downgoing.  Coordination: unable   Gait: unable   Ext: No edema        LABS:    CARDIAC MARKERS:  CARDIAC MARKERS ( 14 Dec 2022 04:30 )  x     / x     / 594 U/L / x     / x      CARDIAC MARKERS ( 12 Dec 2022 06:05 )  x     / x     / 506 U/L / x     / x                                    9.2    20.10 )-----------( 338      ( 14 Dec 2022 06:20 )             28.2     12-14    151<H>  |  117<H>  |  32<H>  ----------------------------<  101<H>  3.9   |  22  |  0.77    Ca    8.2<L>      14 Dec 2022 04:30  Phos  4.1     12-14  Mg     2.30     12-14    TPro  6.0  /  Alb  2.6<L>  /  TBili  2.3<H>  /  DBili  1.1<H>  /  AST  49<H>  /  ALT  47<H>  /  AlkPhos  110  12-14              TELEMETRY: 	    ECG:  	  RADIOLOGY:   DIAGNOSTIC TESTING:  [ ] Echocardiogram:  [ ]  Catheterization:  [ ] Stress Test:    OTHER:

## 2022-12-14 NOTE — SWALLOW BEDSIDE ASSESSMENT ADULT - SWALLOW EVAL: ORAL MUSCULATURE
generally intact
Unable to fully assess due to patient difficulty following directions/generally intact
generally intact

## 2022-12-14 NOTE — PROGRESS NOTE ADULT - ASSESSMENT
52-year-old male with a PMHx significant for TIAs (2011, 2013), CVAs x3 (02/2022 s/p tPA, 8/3/2022 s/p tPA, 11/5/2022 with residual expressive aphasia) on Eliquis, and HLD BIBEMS after being found unresponsive at home, w/ seizure like activity in the hospital.     PLAN  - MRCP if pt will tolerate to evaluate for choledocholithiasis  - No acute surgical intervention  - Care per primary    A Team Surgery  [id number]

## 2022-12-14 NOTE — PROGRESS NOTE ADULT - SUBJECTIVE AND OBJECTIVE BOX
SURGERY DAILY PROGRESS NOTE:     SUBJECTIVE/ROS: Patient seen and examined bedside on AM rounds. Suprapubic pain. Continued bowel function.     OBJECTIVE:  Vital Signs Last 24 Hrs  T(C): 36.9 (14 Dec 2022 06:05), Max: 37.1 (14 Dec 2022 02:28)  T(F): 98.4 (14 Dec 2022 06:05), Max: 98.7 (14 Dec 2022 02:28)  HR: 83 (14 Dec 2022 06:05) (82 - 99)  BP: 121/68 (14 Dec 2022 06:05) (119/68 - 136/80)  BP(mean): --  RR: 17 (14 Dec 2022 06:05) (17 - 19)  SpO2: 99% (14 Dec 2022 06:05) (98% - 99%)    Parameters below as of 14 Dec 2022 06:05  Patient On (Oxygen Delivery Method): room air        PHYSICAL EXAM:  Constitutional: NAD  Respiratory: non-labored breathing, patent airway  Gastrointestinal: abdomen soft, minimal suprapubic tenderness, nondistended  Extremities: warm  Neurological: intact                          9.2    20.10 )-----------( 338      ( 14 Dec 2022 06:20 )             28.2     12-14    151<H>  |  117<H>  |  32<H>  ----------------------------<  101<H>  3.9   |  22  |  0.77    Ca    8.2<L>      14 Dec 2022 04:30  Phos  4.1     12-14  Mg     2.30     12-14    TPro  6.0  /  Alb  2.6<L>  /  TBili  2.3<H>  /  DBili  1.1<H>  /  AST  49<H>  /  ALT  47<H>  /  AlkPhos  110  12-14   PT/INR - ( 14 Dec 2022 04:30 )   PT: 13.8 sec;   INR: 1.19 ratio         PTT - ( 14 Dec 2022 04:30 )  PTT:23.1 sec  I&O's Detail

## 2022-12-14 NOTE — PROGRESS NOTE ADULT - SUBJECTIVE AND OBJECTIVE BOX
Neurology Progress Note    S: Patient seen and examined , now on floor agitated at times in mittens ; found on floor this morning. no head trauma     Medication:  MEDICATIONS  (STANDING):  bisacodyl Suppository 10 milliGRAM(s) Rectal daily  chlorhexidine 2% Cloths 1 Application(s) Topical daily  dextrose 5%. 1000 milliLiter(s) (60 mL/Hr) IV Continuous <Continuous>  dextrose 50% Injectable 25 Gram(s) IV Push once  dextrose 50% Injectable 12.5 Gram(s) IV Push once  dextrose 50% Injectable 25 Gram(s) IV Push once  dextrose Oral Gel 15 Gram(s) Oral once  diphtheria/tetanus/pertussis (acellular) Vaccine (Adacel) 0.5 milliLiter(s) IntraMuscular once  glucagon  Injectable 1 milliGRAM(s) IntraMuscular once  lacosamide IVPB 100 milliGRAM(s) IV Intermittent every 12 hours  levETIRAcetam  IVPB 1500 milliGRAM(s) IV Intermittent every 12 hours    MEDICATIONS  (PRN):      Vitals:    Vital Signs Last 24 Hrs  T(C): 36.9 (12-14-22 @ 06:05), Max: 37.1 (12-14-22 @ 02:28)  T(F): 98.4 (12-14-22 @ 06:05), Max: 98.7 (12-14-22 @ 02:28)  HR: 83 (12-14-22 @ 06:05) (82 - 98)  BP: 121/68 (12-14-22 @ 06:05) (119/68 - 136/80)  BP(mean): --  RR: 17 (12-14-22 @ 06:05) (17 - 18)  SpO2: 99% (12-14-22 @ 06:05) (98% - 99%)          General Exam:   General Appearance: Appropriately dressed and in no acute distress       Head: Normocephalic, atraumatic and no dysmorphic features  Ear, Nose, and Throat: Moist mucous membranes    CVS: S1S2+  Resp: No SOB, no wheeze or rhonchi  Abd: soft NTND  Extremities: No edema, no cyanosis  Skin: No bruises, no rashes     Neurological Exam:    Mental Status: Awake, Oriented x 2, followign some simple.  speaking more but slurred (+ tongue bite .    Cranial Nerves: PERRL, EOMI, VFFC, sensation V1-V3 intact,  no obvious facial asymmetry    Motor: Moving uppers > lowers. uppers at least 3-4/5 in mittens ; not moving LLE very well   Sensation:minimal withdrawal to noxious x 4  R>L   Reflexes: 1+ throughout at biceps, brachioradialis, triceps, patellars and ankles bilaterally and equal. No clonus. R toe and L toe were both downgoing.  Coordination: unable   Gait: unable     I personally reviewed the below data/images/labs:  CBC Full  -  ( 14 Dec 2022 06:20 )  WBC Count : 20.10 K/uL  RBC Count : 3.10 M/uL  Hemoglobin : 9.2 g/dL  Hematocrit : 28.2 %  Platelet Count - Automated : 338 K/uL  Mean Cell Volume : 91.0 fL  Mean Cell Hemoglobin : 29.7 pg  Mean Cell Hemoglobin Concentration : 32.6 gm/dL  Auto Neutrophil # : 16.07 K/uL  Auto Lymphocyte # : 1.09 K/uL  Auto Monocyte # : 1.72 K/uL  Auto Eosinophil # : 0.62 K/uL  Auto Basophil # : 0.12 K/uL  Auto Neutrophil % : 79.9 %  Auto Lymphocyte % : 5.4 %  Auto Monocyte % : 8.6 %  Auto Eosinophil % : 3.1 %  Auto Basophil % : 0.6 %    12-14    151<H>  |  117<H>  |  32<H>  ----------------------------<  101<H>  3.9   |  22  |  0.77    Ca    8.2<L>      14 Dec 2022 04:30  Phos  4.1     12-14  Mg     2.30     12-14    TPro  6.0  /  Alb  2.6<L>  /  TBili  2.3<H>  /  DBili  1.1<H>  /  AST  49<H>  /  ALT  47<H>  /  AlkPhos  110  12-14      < from: CT Head No Cont (12.02.22 @ 20:27) >    ACC: [id number] EXAM:  CT CERVICAL SPINE                        ACC: [id number] EXAM:  CT MAXILLOFACIAL                        ACC: [id number] EXAM:  CT BRAIN                          PROCEDURE DATE:  12/02/2022        INTERPRETATION:  CLINICAL INDICATION: Trauma.    Technique: Noncontrast axial CT of the head, facial bones, and cervical   spine was performed. 3-D reformats of the facial bones was obtained.   Coronal and sagittal reformats were obtained.      COMPARISON: None.    FINDINGS:  Head CT:  The ventricles and sulci are within normal limits. 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 There is no intraparenchymal hematoma, mass effect   or midline shift. No abnormal extra-axial fluid collections or   hemorrhages are present.    There is swelling of the left lateral scalp. The calvarium is intact.   There are scattered mucosal inflammatory changes in the paranasal sinuses.      Cervical spine CT:  Alignment ismaintained. Vertebral bodies are normal in height, without   evidence of fracture or dislocation. Prevertebral soft tissues are within   normal limits without soft tissue swelling or hematoma.    Intervertebral discs are intact. Neural foramina and spinal canal are   intact.    The visualized lung apices are within normal limits.      Facial bone CT:    No acute facial fracture.    There are scattered mucosal inflammatory changes in the paranasal   sinuses. Mastoid air cells are clear.    Soft tissues appear unremarkable.        IMPRESSION:  CT HEAD: No acute abnormality.  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  CT CERVICAL SPINE: No acute abnormality  CT FACIAL BONE: No acute abnormality    --- End of Report ---       DELGADO IVAN MD; Attending Radiologist  This document has been electronically signed. Dec  2 2022  9:02PM    < end of copied text >  < from: CT Lumbar Spine No Cont (12.02.22 @ 20:27) >    ACC: [id number] EXAM:  CT LUMBAR SPINE                        ACC: [id number] EXAM:  CT THORACIC SPINE                          PROCEDURE DATE:  12/02/2022          INTERPRETATION:  CT thoracic and lumbar spine without IV contrast    CLINICAL INFORMATION:  Trauma  Back pain, fracture.    TECHNIQUE:  Contiguous axial 2 mm sections were obtained through the   thoracic and lumbar spine using a single helical acquisition.     Additional 2 mm sagittal and coronal reconstructions of the spine were   obtained. These additional reformatted images were used to evaluate the   spine for alignment, vertebral fractures and the integrity of the the   posterior elements.   This scan was performed using automatic exposure   control (radiation dose reduction software) to obtain a diagnostic image   quality scan with patient dose as low as reasonably achievable.    FINDINGS:   No prior similar studies are available for review    Thoracic and lumbar vertebral body heights are maintained. No vertebral   fracture is seen. No destructive bone lesion is found.  Alignment is   preserved.  Facet joints appear intact and aligned.    Thoracic and lumbar intervertebral disc spaces appear intact.   Degenerative disc disease and spondylosis is noted at T6-T7 throughL4-5   with loss of disc height and associated degenerative endplate changes.   Mild disc bulges at L2-3 through L4-5 flatten the ventral thecal sac and   narrow the BILATERAL neural foramina.    No paraspinal mass is recognized.  Paraspinal soft tissues appear intact.      IMPRESSION:  Degenerative disc disease and spondylosis is noted at T6-T7   through L4-5 with loss of disc height and associated degenerative   endplate changes. Mild disc bulges at L2-3 through L4-5 flatten the   ventral thecal sac and narrow the BILATERAL neural foramina   No   vertebral fracture is recognized.    --- End of Report ---       ANGIE ESCOBAR MD; Attending Radiologist  This document has been electronically signed. Dec  2 2022  8:55PM    < end of copied text >    EEG Classification / Summary:  Abnormal EEG in a comatose patient due to diffuse suppression gradually improving to discontinuous background, with right hemispheric relative attenuation/suppression. No epileptiform abnormalities or seizures are captured.    Clinical Impression:  Severe diffuse cerebral dysfunction that gradually improves is nonspecific in etiology. In this case, it may be related to sedating medication (propofol) with improvement after decreasing and stopping the medication.  Right hemispheric focal cerebral dysfunction can be structural or functional in etiology.      12/7  Clinical Impression:  -Occasional runs of right frontal lateralized periodic discharges (LPDs) at up to 1.3 Hz indicate a potentially epileptogenic focus in the right frontal region and are on the ictal-interictal continuum.  -A pattern on the ictal-interictal continuum is a pattern that does not qualify as an electrographic seizure or electrographic status epilepticus, but there is a reasonable chance that it may be contributing to impaired alertness, causing other clinical symptoms, and/or contributing to neuronal injury.  -Right hemispheric relative attenuation indicates right hemispheric focal cerebral dysfunction, which can be structural or functional in etiology.  -Rare left frontal epileptiform discharges indicate a potentially epileptogenic focus in this reigon  -Severe diffuse slowing and GRDA indicate severe diffuse cerebral dysfunction of nonspecific etiology.  -No electrographic seizures are captured.     < from: MR Head w/wo IV Cont (12.09.22 @ 19:54) >    ACC: [id number] EXAM:  MR BRAIN WAW IC                          PROCEDURE DATE:  12/09/2022          INTERPRETATION:  CLINICAL INDICATION: CVA, found unresponsive at home      Magnetic resonance imaging of the brain was carried out with transaxial   SPGR, FLAIR, fast spin echo T2 weighted images, axial susceptibility   weighted series, diffusion weighted series and sagittal T1 weighted   series on a 1.5 Maritza magnet. Post contrast axial, coronal and sagittal   T1 weighted images were obtained. 10cc of Gadavist were intravenously   injected, 0 cc were discarded.      Comparison is made with the prior brain CT of 12/5/2022 and MRI 11/5/2022.    Mild ventricular and sulcal prominence is consistent with moderate   atrophy for the patient's age. No acute hemorrhage is identified. There   is a focus of hemosiderin within the alexis which is calcified on CT.   Scattered additional foci of hemosiderin deposition are identified in the   left frontal subcortical white matter and right occipital cortex is   nonspecific but may be related to multiple cavernoma is or hypertension.    There is a tiny focus of diffusion restriction in the right frontal   subcortical white matter and right centrum semiovale which are unchanged   since the prior exam and may represent subacute infarcts. Multiplicity   infarcts may be related to hypercoagulable state or cardioembolic events.    After contrast administration there is normal intracranial vascular   enhancement. No abnormal parenchymal or leptomeningeal enhancement.      IMPRESSION: Atrophy for the patient's age. Right frontal subcortical and   right centrum semiovale punctate infarcts are unchanged since 11/5/2022   and likely represent subacute infarcts. No abnormal enhancement. Focus of   calcification in the alexis with old hemosiderin. A few additional   scattered foci of hemosiderin deposition are unchanged.    --- End of Report ---            JUAN MANUEL CASTILLO MD; Attending Radiologist  This document has been electronically signed. Dec  9 2022  8:05PM    < end of copied text >

## 2022-12-14 NOTE — SWALLOW BEDSIDE ASSESSMENT ADULT - ASR SWALLOW REFERRAL
to ensure patient is meeting adequate caloric intake given recommendation of dysphagia diet./Registered Dietitian

## 2022-12-14 NOTE — SWALLOW BEDSIDE ASSESSMENT ADULT - ADDITIONAL RECOMMENDATIONS
1. This department to follow up as schedule permits for diet tolerance.   2. Medical team advised to reconsult this department if there is a change in medical status/ability to tolerate recommended PO diet.

## 2022-12-14 NOTE — CHART NOTE - NSCHARTNOTEFT_GEN_A_CORE
PRE-INTERVENTIONAL RADIOLOGY  PROCEDURE NOTE  Patient Age: 52   Patient Gender: M  Procedure (including site / side if known): Lumbar Puncture  Diagnosis / Indication: New Seizures   Interventional Radiology Attending Physician: Dr Ventura   Ordering Attending Physician: DR Diez   Pertinent medical history: TIA, CVA,   Pertinent labs:                       9.2    20.10 )-----------( 338      ( 14 Dec 2022 06:20 )             28.2   12-14    151<H>  |  117<H>  |  32<H>  ----------------------------<  101<H>  3.9   |  22  |  0.77    Ca    8.2<L>      14 Dec 2022 04:30  Phos  4.1     12-14  Mg     2.30     12-14    TPro  6.0  /  Alb  2.6<L>  /  TBili  2.3<H>  /  DBili  1.1<H>  /  AST  49<H>  /  ALT  47<H>  /  AlkPhos  110  12-14    Patient and Family aware? Yes

## 2022-12-14 NOTE — PROGRESS NOTE ADULT - ATTENDING COMMENTS
DATE OF SERVICE: 12-14-22 @ 11:19    Agree with Resident note above    No events, denies abdominal pain  LFTs stable from yesterday, WBC 20  Abdomen soft NT/ND    Pending MRCP to r/o choledocholithiasis   Further care per medicine team

## 2022-12-14 NOTE — PROGRESS NOTE ADULT - SUBJECTIVE AND OBJECTIVE BOX
Name of Patient : TAMI DUNHAM  MRN: [id number]  Date of visit: 12-14-22       Subjective: Patient seen and examined. No new events except as noted.   more awake  afebrile     REVIEW OF SYSTEMS:    CONSTITUTIONAL: No weakness, fevers or chills  EYES/ENT: No visual changes;  No vertigo or throat pain   NECK: No pain or stiffness  RESPIRATORY: No cough, wheezing, hemoptysis; No shortness of breath  CARDIOVASCULAR: No chest pain or palpitations  GASTROINTESTINAL: No abdominal or epigastric pain. No nausea, vomiting, or hematemesis; No diarrhea or constipation. No melena or hematochezia.  GENITOURINARY: No dysuria, frequency or hematuria  NEUROLOGICAL: No numbness or weakness  SKIN: No itching, burning, rashes, or lesions   All other review of systems is negative unless indicated above.    MEDICATIONS:  MEDICATIONS  (STANDING):  bisacodyl Suppository 10 milliGRAM(s) Rectal daily  chlorhexidine 2% Cloths 1 Application(s) Topical daily  dextrose 5%. 1000 milliLiter(s) (60 mL/Hr) IV Continuous <Continuous>  dextrose 50% Injectable 25 Gram(s) IV Push once  dextrose 50% Injectable 12.5 Gram(s) IV Push once  dextrose 50% Injectable 25 Gram(s) IV Push once  dextrose Oral Gel 15 Gram(s) Oral once  diphtheria/tetanus/pertussis (acellular) Vaccine (Adacel) 0.5 milliLiter(s) IntraMuscular once  glucagon  Injectable 1 milliGRAM(s) IntraMuscular once  lacosamide IVPB 100 milliGRAM(s) IV Intermittent every 12 hours  levETIRAcetam  IVPB 1500 milliGRAM(s) IV Intermittent every 12 hours      PHYSICAL EXAM:  T(C): 36.7 (12-14-22 @ 13:00), Max: 37.1 (12-14-22 @ 02:28)  HR: 88 (12-14-22 @ 13:00) (82 - 98)  BP: 130/87 (12-14-22 @ 13:00) (121/68 - 136/80)  RR: 18 (12-14-22 @ 13:00) (17 - 18)  SpO2: 99% (12-14-22 @ 13:00) (98% - 99%)  Wt(kg): --  I&O's Summary        Appearance:awake, calm   HEENT:  PERRLA   Lymphatic: No lymphadenopathy   Cardiovascular: Normal S1 S2, no JVD  Respiratory: normal effort , clear  Gastrointestinal:  Soft, Non-tender  Skin: No rashes,  warm to touch  Psychiatry:  Mood & affect appropriate  Musculuskeletal: No edema      All labs, Imaging and EKGs personally reviewed                           9.2    20.10 )-----------( 338      ( 14 Dec 2022 06:20 )             28.2               12-14    153<H>  |  120<H>  |  30<H>  ----------------------------<  124<H>  3.7   |  23  |  0.70    Ca    8.2<L>      14 Dec 2022 17:26  Phos  4.1     12-14  Mg     2.30     12-14    TPro  6.0  /  Alb  2.6<L>  /  TBili  2.3<H>  /  DBili  1.1<H>  /  AST  49<H>  /  ALT  47<H>  /  AlkPhos  110  12-14    PT/INR - ( 14 Dec 2022 04:30 )   PT: 13.8 sec;   INR: 1.19 ratio         PTT - ( 14 Dec 2022 04:30 )  PTT:23.1 sec       CARDIAC MARKERS ( 14 Dec 2022 04:30 )  x     / x     / 594 U/L / x     / x

## 2022-12-14 NOTE — PROGRESS NOTE ADULT - ASSESSMENT
52-year-old  M known to me from outpatient setting with  TIAs (2011, 2013), Strokes x3 (02/2022 s/p tPA, 8/3/2022 s/p tPA, 11/5/2022 with residual expressive aphasia) on Eliquis, was on testosterone outpatient stopped after last stroke, HLD BIBEMS after being found unresponsive  Temp 105.4 on arrival tachy and /110. 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   CT cervical spine and maxillofacial scans negative.  CT chest, abdomen, and pelvis w/ contrast concerning for possible partial SBO without transition point. Surgery consulted in ED, no acute surgical intervention at this time.   CT thoracic and lumbar spine showing degenerative disc disease T6-T7 through L4-L5 and mild disc bulges at L2-L3 through L4-L5 that flatten the ventral thecal sac and narrowing of the bilateral neural foramina.  MRI 11/2022: R centrum semiovale and R posterior frontal infarcts   takes adderall at home   + rhabdo  EEG no seizures   + transaminitis   CTH 12/5 stable   outpatient hypercoag workup neg   12/5 extubated then reintibuated for seizure activity and tx back to ICU   EEG this AM 12/5 with only slowing   EEG 12/6 slowing but no seizures   spoke with Pippa Conti (friend) who states after he was taken off the testosterone he ordered a mail order testosterone supplement, unclear if he started it yet, unclear what this was  12/7 EEG no electrogtraphic seizures captured but R LPDs, rare L frontal discharges, severe GRDA.   12/8 EEG improved.  extubated. following some commands   12/9 moving uppers> lowers   12/11 AAOx2 uppers 4-5/5; not moving LLE well   MRI brain neg for new stroke   s/p L knee tap arthrocentesis  by ortho on 12/12   spoke with friend pippa conti - she counted his adderrall and didn't take extra tables. did find ashwagandha and red wood (bottle was sealed) supplement in his apartment   o/e 12/13 AAOx2, slurred but 2/2 tongue bite   Impression:   1) AMS of unclear etiology, possible now seizure, related to adderral withdrawal? 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 had MIMI was question of PFO but not found. s/p ILR     - heparin held today for planning for LP ; now changed to 12/15 with IR   - ortho for L knee s/p tap / arthrocentesis   -  Vimpat 100mg BID    - keppra 1500mg BID.   - fever on arrival, treatred initially for meningitis.  was on abx for aspiration PNA.  would consider LP at this point ; unclear etiology of initial fever planned for 12/13   - monitor LFTs , downtrending   - IVF  - CPK elevated. trend   - would consider endocrine   - there was some concern outpatient he may have a mixed  connective tissue disorder   - statin therapy for secondary stroke prevention when LFTS and CPK improve   - telemetry  - PT/OT/SS/SLP, OOBC  - check FS, glucose control <180  - GI/DVT ppx  - Thank you for allowing me to participate in the care of this patient. Call with questions.   - spoke with Pippa Conti at [phone] for more collateral info and to provide update. spoke again 12/12 over phone.   - spoke with friend at bedside, [place] 12/11   Braxton Forde MD  Vascular Neurology  Office: [phone]

## 2022-12-15 LAB
ALBUMIN SERPL ELPH-MCNC: 2.7 G/DL — LOW (ref 3.3–5)
ALP SERPL-CCNC: 192 U/L — HIGH (ref 40–120)
ALT FLD-CCNC: 124 U/L — HIGH (ref 4–41)
ANION GAP SERPL CALC-SCNC: 11 MMOL/L — SIGNIFICANT CHANGE UP (ref 7–14)
ANION GAP SERPL CALC-SCNC: 12 MMOL/L — SIGNIFICANT CHANGE UP (ref 7–14)
APPEARANCE CSF: CLEAR — SIGNIFICANT CHANGE UP
APTT BLD: 23.2 SEC — LOW (ref 27–36.3)
AST SERPL-CCNC: 135 U/L — HIGH (ref 4–40)
BASOPHILS # BLD AUTO: 0.13 K/UL — SIGNIFICANT CHANGE UP (ref 0–0.2)
BASOPHILS NFR BLD AUTO: 0.7 % — SIGNIFICANT CHANGE UP (ref 0–2)
BILIRUB DIRECT SERPL-MCNC: 0.9 MG/DL — HIGH (ref 0–0.3)
BILIRUB INDIRECT FLD-MCNC: 1.1 MG/DL — HIGH (ref 0–1)
BILIRUB SERPL-MCNC: 2 MG/DL — HIGH (ref 0.2–1.2)
BUN SERPL-MCNC: 24 MG/DL — HIGH (ref 7–23)
BUN SERPL-MCNC: 27 MG/DL — HIGH (ref 7–23)
CALCIUM SERPL-MCNC: 7.7 MG/DL — LOW (ref 8.4–10.5)
CALCIUM SERPL-MCNC: 8.2 MG/DL — LOW (ref 8.4–10.5)
CHLORIDE SERPL-SCNC: 112 MMOL/L — HIGH (ref 98–107)
CHLORIDE SERPL-SCNC: 117 MMOL/L — HIGH (ref 98–107)
CK SERPL-CCNC: 520 U/L — HIGH (ref 30–200)
CO2 SERPL-SCNC: 21 MMOL/L — LOW (ref 22–31)
CO2 SERPL-SCNC: 23 MMOL/L — SIGNIFICANT CHANGE UP (ref 22–31)
COLOR CSF: COLORLESS — SIGNIFICANT CHANGE UP
CREAT SERPL-MCNC: 0.69 MG/DL — SIGNIFICANT CHANGE UP (ref 0.5–1.3)
CREAT SERPL-MCNC: 0.73 MG/DL — SIGNIFICANT CHANGE UP (ref 0.5–1.3)
CSF PCR RESULT: SIGNIFICANT CHANGE UP
EGFR: 109 ML/MIN/1.73M2 — SIGNIFICANT CHANGE UP
EGFR: 111 ML/MIN/1.73M2 — SIGNIFICANT CHANGE UP
EOSINOPHIL # BLD AUTO: 0.68 K/UL — HIGH (ref 0–0.5)
EOSINOPHIL NFR BLD AUTO: 3.8 % — SIGNIFICANT CHANGE UP (ref 0–6)
GLUCOSE BLDC GLUCOMTR-MCNC: 100 MG/DL — HIGH (ref 70–99)
GLUCOSE BLDC GLUCOMTR-MCNC: 104 MG/DL — HIGH (ref 70–99)
GLUCOSE BLDC GLUCOMTR-MCNC: 105 MG/DL — HIGH (ref 70–99)
GLUCOSE BLDC GLUCOMTR-MCNC: 123 MG/DL — HIGH (ref 70–99)
GLUCOSE BLDC GLUCOMTR-MCNC: 152 MG/DL — HIGH (ref 70–99)
GLUCOSE CSF-MCNC: 67 MG/DL — SIGNIFICANT CHANGE UP (ref 40–70)
GLUCOSE SERPL-MCNC: 116 MG/DL — HIGH (ref 70–99)
GLUCOSE SERPL-MCNC: 130 MG/DL — HIGH (ref 70–99)
GRAM STN FLD: SIGNIFICANT CHANGE UP
HCT VFR BLD CALC: 26.1 % — LOW (ref 39–50)
HGB BLD-MCNC: 8.7 G/DL — LOW (ref 13–17)
IANC: 13.58 K/UL — HIGH (ref 1.8–7.4)
IMM GRANULOCYTES NFR BLD AUTO: 3.7 % — HIGH (ref 0–0.9)
INR BLD: 1.24 RATIO — HIGH (ref 0.88–1.16)
LYMPHOCYTES # BLD AUTO: 1.17 K/UL — SIGNIFICANT CHANGE UP (ref 1–3.3)
LYMPHOCYTES # BLD AUTO: 6.5 % — LOW (ref 13–44)
LYMPHOCYTES # CSF: 74 % — SIGNIFICANT CHANGE UP
MAGNESIUM SERPL-MCNC: 2.3 MG/DL — SIGNIFICANT CHANGE UP (ref 1.6–2.6)
MCHC RBC-ENTMCNC: 30 PG — SIGNIFICANT CHANGE UP (ref 27–34)
MCHC RBC-ENTMCNC: 33.3 GM/DL — SIGNIFICANT CHANGE UP (ref 32–36)
MCV RBC AUTO: 90 FL — SIGNIFICANT CHANGE UP (ref 80–100)
MONOCYTES # BLD AUTO: 1.65 K/UL — HIGH (ref 0–0.9)
MONOCYTES NFR BLD AUTO: 9.2 % — SIGNIFICANT CHANGE UP (ref 2–14)
MONOS+MACROS NFR CSF: 25 % — SIGNIFICANT CHANGE UP
NEUTROPHILS # BLD AUTO: 13.58 K/UL — HIGH (ref 1.8–7.4)
NEUTROPHILS # CSF: 1 % — SIGNIFICANT CHANGE UP
NEUTROPHILS NFR BLD AUTO: 76.1 % — SIGNIFICANT CHANGE UP (ref 43–77)
NIGHT BLUE STAIN TISS: SIGNIFICANT CHANGE UP
NRBC # BLD: 0 /100 WBCS — SIGNIFICANT CHANGE UP (ref 0–0)
NRBC # FLD: 0.04 K/UL — HIGH (ref 0–0)
NRBC NFR CSF: 0 CELLS/UL — SIGNIFICANT CHANGE UP (ref 0–5)
OSMOLALITY UR: 882 MOSM/KG — SIGNIFICANT CHANGE UP (ref 50–1200)
PHOSPHATE SERPL-MCNC: 4.1 MG/DL — SIGNIFICANT CHANGE UP (ref 2.5–4.5)
PLATELET # BLD AUTO: 383 K/UL — SIGNIFICANT CHANGE UP (ref 150–400)
POTASSIUM SERPL-MCNC: 3.5 MMOL/L — SIGNIFICANT CHANGE UP (ref 3.5–5.3)
POTASSIUM SERPL-MCNC: 3.7 MMOL/L — SIGNIFICANT CHANGE UP (ref 3.5–5.3)
POTASSIUM SERPL-SCNC: 3.5 MMOL/L — SIGNIFICANT CHANGE UP (ref 3.5–5.3)
POTASSIUM SERPL-SCNC: 3.7 MMOL/L — SIGNIFICANT CHANGE UP (ref 3.5–5.3)
PROT CSF-MCNC: 35 MG/DL — SIGNIFICANT CHANGE UP (ref 15–45)
PROT SERPL-MCNC: 6.2 G/DL — SIGNIFICANT CHANGE UP (ref 6–8.3)
PROTHROM AB SERPL-ACNC: 14.4 SEC — HIGH (ref 10.5–13.4)
RBC # BLD: 2.9 M/UL — LOW (ref 4.2–5.8)
RBC # CSF: 2 CELLS/UL — HIGH (ref 0–0)
RBC # FLD: 15.4 % — HIGH (ref 10.3–14.5)
SODIUM SERPL-SCNC: 146 MMOL/L — HIGH (ref 135–145)
SODIUM SERPL-SCNC: 150 MMOL/L — HIGH (ref 135–145)
SODIUM UR-SCNC: 118 MMOL/L — SIGNIFICANT CHANGE UP
SPECIMEN SOURCE: SIGNIFICANT CHANGE UP
SPECIMEN SOURCE: SIGNIFICANT CHANGE UP
TESTOST FREE SERPL-MCNC: 4.2 PG/ML — LOW (ref 5.7–30.7)
TOTAL CELLS COUNTED, SPINAL FLUID: 100 CELLS — SIGNIFICANT CHANGE UP
TUBE TYPE: SIGNIFICANT CHANGE UP
WBC # BLD: 17.87 K/UL — HIGH (ref 3.8–10.5)
WBC # FLD AUTO: 17.87 K/UL — HIGH (ref 3.8–10.5)

## 2022-12-15 PROCEDURE — 88189 FLOWCYTOMETRY/READ 16 & >: CPT

## 2022-12-15 PROCEDURE — 99232 SBSQ HOSP IP/OBS MODERATE 35: CPT

## 2022-12-15 PROCEDURE — 62328 DX LMBR SPI PNXR W/FLUOR/CT: CPT

## 2022-12-15 RX ORDER — ASPIRIN/CALCIUM CARB/MAGNESIUM 324 MG
81 TABLET ORAL DAILY
Refills: 0 | Status: DISCONTINUED | OUTPATIENT
Start: 2022-12-15 | End: 2023-01-11

## 2022-12-15 RX ADMIN — LACOSAMIDE 120 MILLIGRAM(S): 50 TABLET ORAL at 06:49

## 2022-12-15 RX ADMIN — CHLORHEXIDINE GLUCONATE 1 APPLICATION(S): 213 SOLUTION TOPICAL at 13:03

## 2022-12-15 RX ADMIN — LEVETIRACETAM 400 MILLIGRAM(S): 250 TABLET, FILM COATED ORAL at 17:39

## 2022-12-15 RX ADMIN — LEVETIRACETAM 400 MILLIGRAM(S): 250 TABLET, FILM COATED ORAL at 06:46

## 2022-12-15 RX ADMIN — SODIUM CHLORIDE 75 MILLILITER(S): 9 INJECTION, SOLUTION INTRAVENOUS at 06:57

## 2022-12-15 RX ADMIN — LACOSAMIDE 120 MILLIGRAM(S): 50 TABLET ORAL at 17:35

## 2022-12-15 NOTE — PROGRESS NOTE ADULT - SUBJECTIVE AND OBJECTIVE BOX
Subjective: Patient seen and examined. No new events except as noted.   remains 1:1     REVIEW OF SYSTEMS:  Unable to obtain      MEDICATIONS:  MEDICATIONS  (STANDING):  bisacodyl Suppository 10 milliGRAM(s) Rectal daily  chlorhexidine 2% Cloths 1 Application(s) Topical daily  dextrose 5%. 1000 milliLiter(s) (75 mL/Hr) IV Continuous <Continuous>  dextrose 50% Injectable 25 Gram(s) IV Push once  dextrose 50% Injectable 12.5 Gram(s) IV Push once  dextrose 50% Injectable 25 Gram(s) IV Push once  dextrose Oral Gel 15 Gram(s) Oral once  diphtheria/tetanus/pertussis (acellular) Vaccine (Adacel) 0.5 milliLiter(s) IntraMuscular once  glucagon  Injectable 1 milliGRAM(s) IntraMuscular once  lacosamide IVPB 100 milliGRAM(s) IV Intermittent every 12 hours  levETIRAcetam  IVPB 1500 milliGRAM(s) IV Intermittent every 12 hours      PHYSICAL EXAM:  T(C): 36.7 (12-15-22 @ 03:44), Max: 36.7 (12-14-22 @ 13:00)  HR: 88 (12-15-22 @ 03:44) (82 - 88)  BP: 131/77 (12-15-22 @ 03:44) (127/72 - 131/77)  RR: 18 (12-15-22 @ 03:44) (18 - 18)  SpO2: 99% (12-15-22 @ 03:44) (99% - 99%)  Wt(kg): --  I&O's Summary          Appearance: NAD  HEENT: dry oral mucosa, PERRL, EOMI - L temporal abrasion	  Lymphatic: No lymphadenopathy , no edema  Cardiovascular: Normal S1 S2, No JVD, No murmurs , Peripheral pulses palpable 2+ bilaterally  Respiratory: Coarse BS	  Gastrointestinal:  Soft, Non-tender, + BS	  Skin: No rashes, + ecchymoses, No cyanosis, warm to touch - BL mittes  Neurological Exam:    Mental Status: sleepy, Oriented x 1, followig some simple. minimal verbal saying few words,   Cranial Nerves: PERRL, EOMI, VFFC, sensation V1-V3 intact,  no obvious facial asymmetry    Motor: Moving uppers > lowers. uppers at least 3-4/5 in mittens   Sensation: minimal withdrawal to noxious x 4  R>L   Reflexes: 1+ throughout at biceps, brachioradialis, triceps, patellars and ankles bilaterally and equal. No clonus. R toe and L toe were both downgoing.  Coordination: unable   Gait: unable   Ext: No edema      LABS:    CARDIAC MARKERS:  CARDIAC MARKERS ( 15 Dec 2022 06:35 )  x     / x     / 520 U/L / x     / x      CARDIAC MARKERS ( 14 Dec 2022 04:30 )  x     / x     / 594 U/L / x     / x                                    8.7    17.87 )-----------( 383      ( 15 Dec 2022 06:35 )             26.1     12-15    150<H>  |  117<H>  |  27<H>  ----------------------------<  116<H>  3.7   |  21<L>  |  0.73    Ca    8.2<L>      15 Dec 2022 06:35  Phos  4.1     12-15  Mg     2.30     12-15    TPro  6.0  /  Alb  2.6<L>  /  TBili  2.3<H>  /  DBili  1.1<H>  /  AST  49<H>  /  ALT  47<H>  /  AlkPhos  110  12-14    proBNP:   Lipid Profile:   HgA1c:   TSH:             TELEMETRY: 	    ECG:  	  RADIOLOGY:   DIAGNOSTIC TESTING:  [ ] Echocardiogram:  [ ]  Catheterization:  [ ] Stress Test:    OTHER:

## 2022-12-15 NOTE — CONSULT NOTE ADULT - ASSESSMENT
52-year-old male with a PMHx significant for TIAs (2011, 2013), CVAs x3 (02/2022 s/p tPA, 8/3/2022 s/p tPA, 11/5/2022 with residual expressive aphasia) on Eliquis, and HLD BIBEMS after being found unresponsive at home.nephrology consulted for hypernatremia    Hypernatremia  likely sec to dehydration  continue D5  monitor closely  check urine na and osmo  avoid overcorrection    hypocalcemia  low alb  check vit d 1,25  moniotr 52-year-old male with a PMHx significant for TIAs (2011, 2013), CVAs x3 (02/2022 s/p tPA, 8/3/2022 s/p tPA, 11/5/2022 with residual expressive aphasia) on Eliquis, and HLD BIBEMS after being found unresponsive at home.nephrology consulted for hypernatremia    Hypernatremia  likely sec to dehydration  continue D5  monitor closely  check urine na and osmo  avoid overcorrection    hypocalcemia  low alb  check vit d 1,25  monitor

## 2022-12-15 NOTE — PROGRESS NOTE ADULT - ASSESSMENT
52-year-old male with a PMHx significant for TIAs (2011, 2013), CVAs x3 (02/2022 s/p tPA, 8/3/2022 s/p tPA, 11/5/2022 with residual expressive aphasia) on Eliquis, and HLD BIBEMS after being found unresponsive at home with possible aspiration pna, s/p micu stay, leukocytosis, knee hemarthrosis, fever    Wong Ramírez  Attending Physician   Division of Infectious Disease  Office #[phone]  Available on [employer] Teams also  After 5pm/weekend or no response, call #[phone]

## 2022-12-15 NOTE — PROGRESS NOTE ADULT - ASSESSMENT
52-year-old male with a PMHx significant for TIAs (2011, 2013), CVAs x3 (02/2022 s/p tPA, 8/3/2022 s/p tPA, 11/5/2022 with residual expressive aphasia) on Eliquis, and HLD BIBEMS after being found unresponsive at home, w/ seizure like activity in the hospital.     RECOMMENDATIONS  - Please order daily CMPs to trend LFTs  - MRCP to evaluate for choledocholithiasis when stable  - No acute surgical intervention  - Care per primary    [facility] Team Surgery  [id number]

## 2022-12-15 NOTE — PROGRESS NOTE ADULT - SUBJECTIVE AND OBJECTIVE BOX
Neurology Progress Note    S: Patient seen and examined , now on floor agitated at times in mittens ; found on floor this morning. no head trauma     Medication:    MEDICATIONS  (STANDING):  bisacodyl Suppository 10 milliGRAM(s) Rectal daily  chlorhexidine 2% Cloths 1 Application(s) Topical daily  dextrose 5%. 1000 milliLiter(s) (75 mL/Hr) IV Continuous <Continuous>  dextrose 50% Injectable 25 Gram(s) IV Push once  dextrose 50% Injectable 12.5 Gram(s) IV Push once  dextrose 50% Injectable 25 Gram(s) IV Push once  dextrose Oral Gel 15 Gram(s) Oral once  diphtheria/tetanus/pertussis (acellular) Vaccine (Adacel) 0.5 milliLiter(s) IntraMuscular once  glucagon  Injectable 1 milliGRAM(s) IntraMuscular once  lacosamide IVPB 100 milliGRAM(s) IV Intermittent every 12 hours  levETIRAcetam  IVPB 1500 milliGRAM(s) IV Intermittent every 12 hours    MEDICATIONS  (PRN):      Vitals:    Vital Signs Last 24 Hrs  T(C): 36.7 (12-15-22 @ 03:44), Max: 36.7 (12-14-22 @ 13:00)  T(F): 98 (12-15-22 @ 03:44), Max: 98.1 (12-14-22 @ 19:44)  HR: 88 (12-15-22 @ 03:44) (82 - 88)  BP: 131/77 (12-15-22 @ 03:44) (127/72 - 131/77)  BP(mean): --  RR: 18 (12-15-22 @ 03:44) (18 - 18)  SpO2: 99% (12-15-22 @ 03:44) (99% - 99%)              General Exam:   General Appearance: Appropriately dressed and in no acute distress       Head: Normocephalic, atraumatic and no dysmorphic features  Ear, Nose, and Throat: Moist mucous membranes    CVS: S1S2+  Resp: No SOB, no wheeze or rhonchi  Abd: soft NTND  Extremities: No edema, no cyanosis  Skin: No bruises, no rashes     Neurological Exam:    Mental Status: Awake, Oriented x 2, followign some simple.  speaking more but slurred (+ tongue bite .    Cranial Nerves: PERRL, EOMI, VFFC, sensation V1-V3 intact,  no obvious facial asymmetry    Motor: Moving uppers > lowers. uppers at least 3-4/5 in mittens ; not moving LLE very well   Sensation:minimal withdrawal to noxious x 4  R>L   Reflexes: 1+ throughout at biceps, brachioradialis, triceps, patellars and ankles bilaterally and equal. No clonus. R toe and L toe were both downgoing.  Coordination: unable   Gait: unable     I personally reviewed the below data/images/labs:  CBC Full  -  ( 15 Dec 2022 06:35 )  WBC Count : 17.87 K/uL  RBC Count : 2.90 M/uL  Hemoglobin : 8.7 g/dL  Hematocrit : 26.1 %  Platelet Count - Automated : 383 K/uL  Mean Cell Volume : 90.0 fL  Mean Cell Hemoglobin : 30.0 pg  Mean Cell Hemoglobin Concentration : 33.3 gm/dL  Auto Neutrophil # : 13.58 K/uL  Auto Lymphocyte # : 1.17 K/uL  Auto Monocyte # : 1.65 K/uL  Auto Eosinophil # : 0.68 K/uL  Auto Basophil # : 0.13 K/uL  Auto Neutrophil % : 76.1 %  Auto Lymphocyte % : 6.5 %  Auto Monocyte % : 9.2 %  Auto Eosinophil % : 3.8 %  Auto Basophil % : 0.7 %    12-15    150<H>  |  117<H>  |  27<H>  ----------------------------<  116<H>  3.7   |  21<L>  |  0.73    Ca    8.2<L>      15 Dec 2022 06:35  Phos  4.1     12-15  Mg     2.30     12-15    TPro  6.0  /  Alb  2.6<L>  /  TBili  2.3<H>  /  DBili  1.1<H>  /  AST  49<H>  /  ALT  47<H>  /  AlkPhos  110  12-14      < from: CT Head No Cont (12.02.22 @ 20:27) >    ACC: [id number] EXAM:  CT CERVICAL SPINE                        ACC: [id number] EXAM:  CT MAXILLOFACIAL                        ACC: [id number] EXAM:  CT BRAIN                          PROCEDURE DATE:  12/02/2022        INTERPRETATION:  CLINICAL INDICATION: Trauma.    Technique: Noncontrast axial CT of the head, facial bones, and cervical   spine was performed. 3-D reformats of the facial bones was obtained.   Coronal and sagittal reformats were obtained.      COMPARISON: None.    FINDINGS:  Head CT:  The ventricles and sulci are within normal limits. 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 There is no intraparenchymal hematoma, mass effect   or midline shift. No abnormal extra-axial fluid collections or   hemorrhages are present.    There is swelling of the left lateral scalp. The calvarium is intact.   There are scattered mucosal inflammatory changes in the paranasal sinuses.      Cervical spine CT:  Alignment ismaintained. Vertebral bodies are normal in height, without   evidence of fracture or dislocation. Prevertebral soft tissues are within   normal limits without soft tissue swelling or hematoma.    Intervertebral discs are intact. Neural foramina and spinal canal are   intact.    The visualized lung apices are within normal limits.      Facial bone CT:    No acute facial fracture.    There are scattered mucosal inflammatory changes in the paranasal   sinuses. Mastoid air cells are clear.    Soft tissues appear unremarkable.        IMPRESSION:  CT HEAD: No acute abnormality.  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  CT CERVICAL SPINE: No acute abnormality  CT FACIAL BONE: No acute abnormality    --- End of Report ---       DELGADO IVAN MD; Attending Radiologist  This document has been electronically signed. Dec  2 2022  9:02PM    < end of copied text >  < from: CT Lumbar Spine No Cont (12.02.22 @ 20:27) >    ACC: [id number] EXAM:  CT LUMBAR SPINE                        ACC: [id number] EXAM:  CT THORACIC SPINE                          PROCEDURE DATE:  12/02/2022          INTERPRETATION:  CT thoracic and lumbar spine without IV contrast    CLINICAL INFORMATION:  Trauma  Back pain, fracture.    TECHNIQUE:  Contiguous axial 2 mm sections were obtained through the   thoracic and lumbar spine using a single helical acquisition.     Additional 2 mm sagittal and coronal reconstructions of the spine were   obtained. These additional reformatted images were used to evaluate the   spine for alignment, vertebral fractures and the integrity of the the   posterior elements.   This scan was performed using automatic exposure   control (radiation dose reduction software) to obtain a diagnostic image   quality scan with patient dose as low as reasonably achievable.    FINDINGS:   No prior similar studies are available for review    Thoracic and lumbar vertebral body heights are maintained. No vertebral   fracture is seen. No destructive bone lesion is found.  Alignment is   preserved.  Facet joints appear intact and aligned.    Thoracic and lumbar intervertebral disc spaces appear intact.   Degenerative disc disease and spondylosis is noted at T6-T7 throughL4-5   with loss of disc height and associated degenerative endplate changes.   Mild disc bulges at L2-3 through L4-5 flatten the ventral thecal sac and   narrow the BILATERAL neural foramina.    No paraspinal mass is recognized.  Paraspinal soft tissues appear intact.      IMPRESSION:  Degenerative disc disease and spondylosis is noted at T6-T7   through L4-5 with loss of disc height and associated degenerative   endplate changes. Mild disc bulges at L2-3 through L4-5 flatten the   ventral thecal sac and narrow the BILATERAL neural foramina   No   vertebral fracture is recognized.    --- End of Report ---       ANGIE ESCOBAR MD; Attending Radiologist  This document has been electronically signed. Dec  2 2022  8:55PM    < end of copied text >    EEG Classification / Summary:  Abnormal EEG in a comatose patient due to diffuse suppression gradually improving to discontinuous background, with right hemispheric relative attenuation/suppression. No epileptiform abnormalities or seizures are captured.    Clinical Impression:  Severe diffuse cerebral dysfunction that gradually improves is nonspecific in etiology. In this case, it may be related to sedating medication (propofol) with improvement after decreasing and stopping the medication.  Right hemispheric focal cerebral dysfunction can be structural or functional in etiology.      12/7  Clinical Impression:  -Occasional runs of right frontal lateralized periodic discharges (LPDs) at up to 1.3 Hz indicate a potentially epileptogenic focus in the right frontal region and are on the ictal-interictal continuum.  -A pattern on the ictal-interictal continuum is a pattern that does not qualify as an electrographic seizure or electrographic status epilepticus, but there is a reasonable chance that it may be contributing to impaired alertness, causing other clinical symptoms, and/or contributing to neuronal injury.  -Right hemispheric relative attenuation indicates right hemispheric focal cerebral dysfunction, which can be structural or functional in etiology.  -Rare left frontal epileptiform discharges indicate a potentially epileptogenic focus in this reigon  -Severe diffuse slowing and GRDA indicate severe diffuse cerebral dysfunction of nonspecific etiology.  -No electrographic seizures are captured.     < from: MR Head w/wo IV Cont (12.09.22 @ 19:54) >    ACC: [id number] EXAM:  MR BRAIN WAW IC                          PROCEDURE DATE:  12/09/2022          INTERPRETATION:  CLINICAL INDICATION: CVA, found unresponsive at home      Magnetic resonance imaging of the brain was carried out with transaxial   SPGR, FLAIR, fast spin echo T2 weighted images, axial susceptibility   weighted series, diffusion weighted series and sagittal T1 weighted   series on a 1.5 Maritza magnet. Post contrast axial, coronal and sagittal   T1 weighted images were obtained. 10cc of Gadavist were intravenously   injected, 0 cc were discarded.      Comparison is made with the prior brain CT of 12/5/2022 and MRI 11/5/2022.    Mild ventricular and sulcal prominence is consistent with moderate   atrophy for the patient's age. No acute hemorrhage is identified. There   is a focus of hemosiderin within the alexis which is calcified on CT.   Scattered additional foci of hemosiderin deposition are identified in the   left frontal subcortical white matter and right occipital cortex is   nonspecific but may be related to multiple cavernoma is or hypertension.    There is a tiny focus of diffusion restriction in the right frontal   subcortical white matter and right centrum semiovale which are unchanged   since the prior exam and may represent subacute infarcts. Multiplicity   infarcts may be related to hypercoagulable state or cardioembolic events.    After contrast administration there is normal intracranial vascular   enhancement. No abnormal parenchymal or leptomeningeal enhancement.      IMPRESSION: Atrophy for the patient's age. Right frontal subcortical and   right centrum semiovale punctate infarcts are unchanged since 11/5/2022   and likely represent subacute infarcts. No abnormal enhancement. Focus of   calcification in the alexis with old hemosiderin. A few additional   scattered foci of hemosiderin deposition are unchanged.    --- End of Report ---            JUAN MANUEL CASTILLO MD; Attending Radiologist  This document has been electronically signed. Dec  9 2022  8:05PM    < end of copied text >

## 2022-12-15 NOTE — PROGRESS NOTE ADULT - SUBJECTIVE AND OBJECTIVE BOX
TAMI DUNHAM 52y [id number]    Patient is a 52y old  Male who presents with a chief complaint of Unresponsive (15 Dec 2022 10:08)      Follow Up/CC:  ID following for fever    Interval History/ROS: no fever    Allergies    No Known Allergies    Intolerances        ANTIMICROBIALS:      MEDICATIONS  (STANDING):  bisacodyl Suppository 10 milliGRAM(s) Rectal daily  chlorhexidine 2% Cloths 1 Application(s) Topical daily  dextrose 5%. 1000 milliLiter(s) (75 mL/Hr) IV Continuous <Continuous>  dextrose 50% Injectable 25 Gram(s) IV Push once  dextrose 50% Injectable 12.5 Gram(s) IV Push once  dextrose 50% Injectable 25 Gram(s) IV Push once  dextrose Oral Gel 15 Gram(s) Oral once  diphtheria/tetanus/pertussis (acellular) Vaccine (Adacel) 0.5 milliLiter(s) IntraMuscular once  glucagon  Injectable 1 milliGRAM(s) IntraMuscular once  lacosamide IVPB 100 milliGRAM(s) IV Intermittent every 12 hours  levETIRAcetam  IVPB 1500 milliGRAM(s) IV Intermittent every 12 hours    MEDICATIONS  (PRN):        Vital Signs Last 24 Hrs  T(C): 36.7 (15 Dec 2022 03:44), Max: 36.7 (14 Dec 2022 13:00)  T(F): 98 (15 Dec 2022 03:44), Max: 98.1 (14 Dec 2022 19:44)  HR: 88 (15 Dec 2022 03:44) (82 - 88)  BP: 131/77 (15 Dec 2022 03:44) (127/72 - 131/77)  BP(mean): --  RR: 18 (15 Dec 2022 03:44) (18 - 18)  SpO2: 99% (15 Dec 2022 03:44) (99% - 99%)    Parameters below as of 15 Dec 2022 03:44  Patient On (Oxygen Delivery Method): room air        CBC Full  -  ( 15 Dec 2022 06:35 )  WBC Count : 17.87 K/uL  RBC Count : 2.90 M/uL  Hemoglobin : 8.7 g/dL  Hematocrit : 26.1 %  Platelet Count - Automated : 383 K/uL  Mean Cell Volume : 90.0 fL  Mean Cell Hemoglobin : 30.0 pg  Mean Cell Hemoglobin Concentration : 33.3 gm/dL  Auto Neutrophil # : 13.58 K/uL  Auto Lymphocyte # : 1.17 K/uL  Auto Monocyte # : 1.65 K/uL  Auto Eosinophil # : 0.68 K/uL  Auto Basophil # : 0.13 K/uL  Auto Neutrophil % : 76.1 %  Auto Lymphocyte % : 6.5 %  Auto Monocyte % : 9.2 %  Auto Eosinophil % : 3.8 %  Auto Basophil % : 0.7 %    12-15    150<H>  |  117<H>  |  27<H>  ----------------------------<  116<H>  3.7   |  21<L>  |  0.73    Ca    8.2<L>      15 Dec 2022 06:35  Phos  4.1     12-15  Mg     2.30     12-15    TPro  6.2  /  Alb  2.7<L>  /  TBili  2.0<H>  /  DBili  0.9<H>  /  AST  135<H>  /  ALT  124<H>  /  AlkPhos  192<H>  12-15    LIVER FUNCTIONS - ( 15 Dec 2022 06:35 )  Alb: 2.7 g/dL / Pro: 6.2 g/dL / ALK PHOS: 192 U/L / ALT: 124 U/L / AST: 135 U/L / GGT: x               MICROBIOLOGY:  .Blood Blood-Peripheral  12-12-22   No growth to date.  --  --      .Blood Blood-Peripheral  12-12-22   No growth to date.  --  --      .Body Fluid Synovial Fluid  12-12-22   No growth  --    polymorphonuclear leukocytes  No organisms seen  by cytocentrifuge      .Blood Blood-Venous  12-05-22   No Growth Final  --  --      .Blood Blood-Peripheral  12-02-22   No Growth Final  --  --      .Blood Blood-Peripheral  12-02-22   No Growth Final  --  --      Catheterized Catheterized  12-02-22   <10,000 CFU/mL Normal Urogenital Narda  --  --              v    Rapid RVP Result: Juanita (12-12 @ 17:15)          RADIOLOGY

## 2022-12-15 NOTE — PROGRESS NOTE ADULT - ATTENDING COMMENTS
DATE OF SERVICE: 12-15-22 @ 13:39    Overnight events noted  WBC and bili slightly down  Still no significant abdominal pain  Can obtain MRCP when clinically stable

## 2022-12-15 NOTE — PROGRESS NOTE ADULT - SUBJECTIVE AND OBJECTIVE BOX
Name of Patient : TAMI DUNHAM  MRN: [id number]  Date of visit: 12-15-22      Subjective: Patient seen and examined. No new events except as noted.   Doing okay   started on oral feeding   LP today     REVIEW OF SYSTEMS:  no pain or discomfort     MEDICATIONS:  MEDICATIONS  (STANDING):  bisacodyl Suppository 10 milliGRAM(s) Rectal daily  chlorhexidine 2% Cloths 1 Application(s) Topical daily  dextrose 5%. 1000 milliLiter(s) (75 mL/Hr) IV Continuous <Continuous>  dextrose 50% Injectable 25 Gram(s) IV Push once  dextrose 50% Injectable 12.5 Gram(s) IV Push once  dextrose 50% Injectable 25 Gram(s) IV Push once  dextrose Oral Gel 15 Gram(s) Oral once  diphtheria/tetanus/pertussis (acellular) Vaccine (Adacel) 0.5 milliLiter(s) IntraMuscular once  glucagon  Injectable 1 milliGRAM(s) IntraMuscular once  lacosamide IVPB 100 milliGRAM(s) IV Intermittent every 12 hours  levETIRAcetam  IVPB 1500 milliGRAM(s) IV Intermittent every 12 hours      PHYSICAL EXAM:  T(C): 36.7 (12-15-22 @ 12:45), Max: 36.7 (12-14-22 @ 19:44)  HR: 80 (12-15-22 @ 12:45) (80 - 88)  BP: 135/78 (12-15-22 @ 12:45) (127/72 - 135/78)  RR: 18 (12-15-22 @ 12:45) (18 - 18)  SpO2: 99% (12-15-22 @ 12:45) (99% - 99%)  Wt(kg): --  I&O's Summary        Appearance: Normal	  HEENT:  PERRLA   Lymphatic: No lymphadenopathy   Cardiovascular: Normal S1 S2, no JVD  Respiratory: normal effort , clear  Gastrointestinal:  Soft, Non-tender  Skin: No rashes,  warm to touch  Psychiatry:  Mood & affect appropriate  Musculuskeletal: No edema      All labs, Imaging and EKGs personally reviewed     Culture - CSF with Gram Stain (collected 12-15-22 @ 12:13)  Source: .CSF CSF  Gram Stain (12-15-22 @ 16:14):    polymorphonuclear leukocytes seen    No organisms seen    by cytocentrifuge                            8.7    17.87 )-----------( 383      ( 15 Dec 2022 06:35 )             26.1               12-15    150<H>  |  117<H>  |  27<H>  ----------------------------<  116<H>  3.7   |  21<L>  |  0.73    Ca    8.2<L>      15 Dec 2022 06:35  Phos  4.1     12-15  Mg     2.30     12-15    TPro  6.2  /  Alb  2.7<L>  /  TBili  2.0<H>  /  DBili  0.9<H>  /  AST  135<H>  /  ALT  124<H>  /  AlkPhos  192<H>  12-15    PT/INR - ( 15 Dec 2022 06:35 )   PT: 14.4 sec;   INR: 1.24 ratio         PTT - ( 15 Dec 2022 06:35 )  PTT:23.2 sec       CARDIAC MARKERS ( 15 Dec 2022 06:35 )  x     / x     / 520 U/L / x     / x      CARDIAC MARKERS ( 14 Dec 2022 04:30 )  x     / x     / 594 U/L / x     / x

## 2022-12-15 NOTE — PROGRESS NOTE ADULT - SUBJECTIVE AND OBJECTIVE BOX
SURGERY DAILY PROGRESS NOTE:     SUBJECTIVE/ROS: No acute events overnight. Patient seen and examined bedside on AM rounds.     OBJECTIVE:  Vital Signs Last 24 Hrs  T(C): 36.7 (15 Dec 2022 03:44), Max: 36.7 (14 Dec 2022 13:00)  T(F): 98 (15 Dec 2022 03:44), Max: 98.1 (14 Dec 2022 19:44)  HR: 88 (15 Dec 2022 03:44) (82 - 88)  BP: 131/77 (15 Dec 2022 03:44) (127/72 - 131/77)  BP(mean): --  RR: 18 (15 Dec 2022 03:44) (18 - 18)  SpO2: 99% (15 Dec 2022 03:44) (99% - 99%)    Parameters below as of 15 Dec 2022 03:44  Patient On (Oxygen Delivery Method): room air        PHYSICAL EXAM:  Constitutional: NAD  Respiratory: non-labored breathing, patent airway  Gastrointestinal: abdomen soft, nontender, nondistended  Extremities: warm  Neurological: intact                          8.7    17.87 )-----------( 383      ( 15 Dec 2022 06:35 )             26.1     12-15    150<H>  |  117<H>  |  27<H>  ----------------------------<  116<H>  3.7   |  21<L>  |  0.73    Ca    8.2<L>      15 Dec 2022 06:35  Phos  4.1     12-15  Mg     2.30     12-15    TPro  6.0  /  Alb  2.6<L>  /  TBili  2.3<H>  /  DBili  1.1<H>  /  AST  49<H>  /  ALT  47<H>  /  AlkPhos  110  12-14   PT/INR - ( 15 Dec 2022 06:35 )   PT: 14.4 sec;   INR: 1.24 ratio         PTT - ( 15 Dec 2022 06:35 )  PTT:23.2 sec  I&O's Detail      IMAGING:               SURGERY DAILY PROGRESS NOTE:     SUBJECTIVE/ROS: No acute events overnight. Patient seen and examined bedside on AM rounds. IR LP today. c/o L knee and abd pain. Continued bowel function.     OBJECTIVE:  Vital Signs Last 24 Hrs  T(C): 36.7 (15 Dec 2022 03:44), Max: 36.7 (14 Dec 2022 13:00)  T(F): 98 (15 Dec 2022 03:44), Max: 98.1 (14 Dec 2022 19:44)  HR: 88 (15 Dec 2022 03:44) (82 - 88)  BP: 131/77 (15 Dec 2022 03:44) (127/72 - 131/77)  BP(mean): --  RR: 18 (15 Dec 2022 03:44) (18 - 18)  SpO2: 99% (15 Dec 2022 03:44) (99% - 99%)    Parameters below as of 15 Dec 2022 03:44  Patient On (Oxygen Delivery Method): room air    PHYSICAL EXAM:  Constitutional: NAD  Respiratory: non-labored breathing, patent airway  Gastrointestinal: abdomen soft, nontender, nondistended  Extremities: warm                          8.7    17.87 )-----------( 383      ( 15 Dec 2022 06:35 )             26.1     12-15    150<H>  |  117<H>  |  27<H>  ----------------------------<  116<H>  3.7   |  21<L>  |  0.73    Ca    8.2<L>      15 Dec 2022 06:35  Phos  4.1     12-15  Mg     2.30     12-15    TPro  6.0  /  Alb  2.6<L>  /  TBili  2.3<H>  /  DBili  1.1<H>  /  AST  49<H>  /  ALT  47<H>  /  AlkPhos  110  12-14   PT/INR - ( 15 Dec 2022 06:35 )   PT: 14.4 sec;   INR: 1.24 ratio         PTT - ( 15 Dec 2022 06:35 )  PTT:23.2 sec

## 2022-12-15 NOTE — PROGRESS NOTE ADULT - ASSESSMENT
52-year-old  M known to me from outpatient setting with  TIAs (2011, 2013), Strokes x3 (02/2022 s/p tPA, 8/3/2022 s/p tPA, 11/5/2022 with residual expressive aphasia) on Eliquis, was on testosterone outpatient stopped after last stroke, HLD BIBEMS after being found unresponsive  Temp 105.4 on arrival tachy and /110. 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   CT cervical spine and maxillofacial scans negative.  CT chest, abdomen, and pelvis w/ contrast concerning for possible partial SBO without transition point. Surgery consulted in ED, no acute surgical intervention at this time.   CT thoracic and lumbar spine showing degenerative disc disease T6-T7 through L4-L5 and mild disc bulges at L2-L3 through L4-L5 that flatten the ventral thecal sac and narrowing of the bilateral neural foramina.  MRI 11/2022: R centrum semiovale and R posterior frontal infarcts   takes adderall at home   + rhabdo  EEG no seizures   + transaminitis   CTH 12/5 stable   outpatient hypercoag workup neg   12/5 extubated then reintibuated for seizure activity and tx back to ICU   EEG this AM 12/5 with only slowing   EEG 12/6 slowing but no seizures   spoke with Pippa Conti (friend) who states after he was taken off the testosterone he ordered a mail order testosterone supplement, unclear if he started it yet, unclear what this was  12/7 EEG no electrogtraphic seizures captured but R LPDs, rare L frontal discharges, severe GRDA.   12/8 EEG improved.  extubated. following some commands   12/9 moving uppers> lowers   12/11 AAOx2 uppers 4-5/5; not moving LLE well   MRI brain neg for new stroke   s/p L knee tap arthrocentesis  by ortho on 12/12   spoke with friend pippa conti - she counted his adderrall and didn't take extra tables. did find ashwagandha and red wood (bottle was sealed) supplement in his apartment   o/e 12/13 AAOx2, slurred but 2/2 tongue bite   Impression:   1) AMS of unclear etiology, possible now seizure, related to adderral withdrawal? 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 had MIMI was question of PFO but not found. s/p ILR   -SS   - heparin held today for planning for LP ; now changed to 12/15 with IR   - ortho for L knee s/p tap / arthrocentesis   -  Vimpat 100mg BID    - keppra 1500mg BID.   - fever on arrival, treatred initially for meningitis.  was on abx for aspiration PNA.  would consider LP at this point ; unclear etiology of initial fever planned for 12/13   - monitor LFTs , downtrending   - IVF  - CPK elevated. trend   - would consider endocrine   - there was some concern outpatient he may have a mixed  connective tissue disorder   - statin therapy for secondary stroke prevention when LFTS and CPK improve   - telemetry  - PT/OT/SS/SLP, OOBC  - check FS, glucose control <180  - GI/DVT ppx  - Thank you for allowing me to participate in the care of this patient. Call with questions.   - spoke with Pippa Conti at [phone] for more collateral info and to provide update. spoke again 12/12 over phone.   - spoke with friend at bedside, [place] 12/11   Braxton Forde MD  Vascular Neurology  Office: [phone]

## 2022-12-15 NOTE — CONSULT NOTE ADULT - SUBJECTIVE AND OBJECTIVE BOX
[facility]   MD SIVA CLAUDIO MD KRISTINE SOLTANPOUR, DO ANGELA WONG, PA        TEL:  OFFICE: [phone]  From 5pm-7am answering service [phone]    --- INITIAL RENAL CONSULT NOTE ---date of service 12-15-22 @ 14:42    HPI:  52-year-old male with a PMHx significant for TIAs (2011, 2013), CVAs x3 (02/2022 s/p tPA, 8/3/2022 s/p tPA, 11/5/2022 with residual expressive aphasia) on Eliquis, and HLD BIBEMS after being found unresponsive at home.  On arrival, patient with rectal temp 105.4 F, tachy to 130s, hypertensive to 180/110, and tachypneic to 30. C-collar was placed. Patient was intubated in the ED for airway protection with etomidate and succinylcholine. Patient sedated with propofol and fentanyl. Started on vanco and zosyn. 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 CT cervical spine and maxillofacial scans negative. CT chest, abdomen, and pelvis w/ contrast concerning for possible partial SBO without transition point. Surgery consulted in ED, no acute surgical intervention at this time. CT thoracic and lumbar spine showing degenerative disc disease T6-T7 through L4-L5 and mild disc bulges at L2-L3 through L4-L5 that flatten the ventral thecal sac and narrowing of the bilateral neural foramina.    nephrology consulted for hypernatremia. pt seen in at bedside. has slight aphasia but A+ox4 denied hx of ckd       Allergies:  No Known Allergies      PAST MEDICAL & SURGICAL HISTORY:  History of TIAs      CVA (cerebrovascular accident)      HLD (hyperlipidemia)      Vertigo      Unresponsiveness      No significant past surgical history          Home Medications Reviewed    Hospital Medications:   MEDICATIONS  (STANDING):  bisacodyl Suppository 10 milliGRAM(s) Rectal daily  chlorhexidine 2% Cloths 1 Application(s) Topical daily  dextrose 5%. 1000 milliLiter(s) (75 mL/Hr) IV Continuous <Continuous>  dextrose 50% Injectable 25 Gram(s) IV Push once  dextrose 50% Injectable 12.5 Gram(s) IV Push once  dextrose 50% Injectable 25 Gram(s) IV Push once  dextrose Oral Gel 15 Gram(s) Oral once  diphtheria/tetanus/pertussis (acellular) Vaccine (Adacel) 0.5 milliLiter(s) IntraMuscular once  glucagon  Injectable 1 milliGRAM(s) IntraMuscular once  lacosamide IVPB 100 milliGRAM(s) IV Intermittent every 12 hours  levETIRAcetam  IVPB 1500 milliGRAM(s) IV Intermittent every 12 hours      SOCIAL HISTORY:  Denies ETOh, Smoking,     FAMILY HISTORY:  No pertinent family history in first degree relatives        REVIEW OF SYSTEMS:  CONSTITUTIONAL: No weakness, fevers or chills  EYES/ENT: No visual changes;  No vertigo or throat pain   NECK: No pain or stiffness  RESPIRATORY: No cough, wheezing, hemoptysis; No shortness of breath  CARDIOVASCULAR: No chest pain or palpitations.  GASTROINTESTINAL: No abdominal or epigastric pain. No nausea, vomiting, or hematemesis; No diarrhea or constipation. No melena or hematochezia.  GENITOURINARY: No dysuria, frequency, foamy urine, urinary urgency, incontinence or hematuria  NEUROLOGICAL: see hpi  SKIN: No itching, burning, rashes, or lesions   VASCULAR: No bilateral lower extremity edema.   All other review of systems is negative unless indicated above.    VITALS:  T(F): 98 (12-15-22 @ 12:45), Max: 98.1 (12-14-22 @ 19:44)  HR: 80 (12-15-22 @ 12:45)  BP: 135/78 (12-15-22 @ 12:45)  RR: 18 (12-15-22 @ 12:45)  SpO2: 99% (12-15-22 @ 12:45)  Wt(kg): --        PHYSICAL EXAM:  General: NAD  HEENT: anicteric sclera, oropharynx clear, MMM  Neck: No JVD  Respiratory: CTAB, no wheezes, rales or rhonchi  Cardiovascular: S1, S2, RRR  Gastrointestinal: BS+, soft, NT/ND  Extremities: No cyanosis or clubbing. No peripheral edema  Neurological: A/O x 3, aphasia   Psychiatric: Normal mood, normal affect  : No CVA tenderness. No fish.   Skin: No rashes      LABS:  12-15    150<H>  |  117<H>  |  27<H>  ----------------------------<  116<H>  3.7   |  21<L>  |  0.73    Ca    8.2<L>      15 Dec 2022 06:35  Phos  4.1     12-15  Mg     2.30     12-15    TPro  6.2  /  Alb  2.7<L>  /  TBili  2.0<H>  /  DBili  0.9<H>  /  AST  135<H>  /  ALT  124<H>  /  AlkPhos  192<H>  12-15    Creatinine Trend: 0.73 <--, 0.70 <--, 0.77 <--, 0.85 <--, 0.84 <--, 0.79 <--, 0.81 <--, 0.71 <--                        8.7    17.87 )-----------( 383      ( 15 Dec 2022 06:35 )             26.1     Urine Studies:        RADIOLOGY & ADDITIONAL STUDIES:                 [facility]   MD SIVA CLAUDIO MD KRISTINE SOLTANPOUR, DO ANGELA WONG, PA        TEL:  OFFICE: [phone]  From 5pm-7am answering service [phone]    --- INITIAL RENAL CONSULT NOTE ---date of service 12-15-22 @ 14:42    HPI:  52-year-old male with a PMHx significant for TIAs (2011, 2013), CVAs x3 (02/2022 s/p tPA, 8/3/2022 s/p tPA, 11/5/2022 with residual expressive aphasia) on Eliquis, and HLD BIBEMS after being found unresponsive at home.  On arrival, patient with rectal temp 105.4 F, tachy to 130s, hypertensive to 180/110, and tachypneic to 30. C-collar was placed. Patient was intubated in the ED for airway protection with etomidate and succinylcholine. Patient sedated with propofol and fentanyl. Started on vanco and zosyn. 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 CT cervical spine and maxillofacial scans negative. CT chest, abdomen, and pelvis w/ contrast concerning for possible partial SBO without transition point. Surgery consulted in ED, no acute surgical intervention at this time. CT thoracic and lumbar spine showing degenerative disc disease T6-T7 through L4-L5 and mild disc bulges at L2-L3 through L4-L5 that flatten the ventral thecal sac and narrowing of the bilateral neural foramina.    nephrology consulted for hypernatremia. pt seen in at bedside. has slight aphasia but A+ox4 denied hx of ckd       Allergies:  No Known Allergies      PAST MEDICAL & SURGICAL HISTORY:  History of TIAs      CVA (cerebrovascular accident)      HLD (hyperlipidemia)      Vertigo      Unresponsiveness      No significant past surgical history        Home Medications:  Adderall 15 mg oral tablet: 15 milligram(s) orally once a day (02 Dec 2022 23:10)  apixaban 5 mg oral tablet: 1 tab(s) orally every 12 hours restart 11/23 Am (02 Dec 2022 23:10)  aspirin 81 mg oral delayed release tablet: 1 tab(s) orally once a day (02 Dec 2022 23:10)  atorvastatin 40 mg oral tablet: 1 tab(s) orally once a day (02 Dec 2022 23:46)    Home Medications Reviewed    Hospital Medications:   MEDICATIONS  (STANDING):  bisacodyl Suppository 10 milliGRAM(s) Rectal daily  chlorhexidine 2% Cloths 1 Application(s) Topical daily  dextrose 5%. 1000 milliLiter(s) (75 mL/Hr) IV Continuous <Continuous>  dextrose 50% Injectable 25 Gram(s) IV Push once  dextrose 50% Injectable 12.5 Gram(s) IV Push once  dextrose 50% Injectable 25 Gram(s) IV Push once  dextrose Oral Gel 15 Gram(s) Oral once  diphtheria/tetanus/pertussis (acellular) Vaccine (Adacel) 0.5 milliLiter(s) IntraMuscular once  glucagon  Injectable 1 milliGRAM(s) IntraMuscular once  lacosamide IVPB 100 milliGRAM(s) IV Intermittent every 12 hours  levETIRAcetam  IVPB 1500 milliGRAM(s) IV Intermittent every 12 hours      SOCIAL HISTORY:  Denies ETOh, Smoking,     FAMILY HISTORY:  No pertinent family history in first degree relatives        REVIEW OF SYSTEMS:  CONSTITUTIONAL: No weakness, fevers or chills  EYES/ENT: No visual changes;  No vertigo or throat pain   NECK: No pain or stiffness  RESPIRATORY: No cough, wheezing, hemoptysis; No shortness of breath  CARDIOVASCULAR: No chest pain or palpitations.  GASTROINTESTINAL: No abdominal or epigastric pain. No nausea, vomiting, or hematemesis; No diarrhea or constipation. No melena or hematochezia.  GENITOURINARY: No dysuria, frequency, foamy urine, urinary urgency, incontinence or hematuria  NEUROLOGICAL: see hpi  SKIN: No itching, burning, rashes, or lesions   VASCULAR: No bilateral lower extremity edema.   All other review of systems is negative unless indicated above.    VITALS:  T(F): 98 (12-15-22 @ 12:45), Max: 98.1 (12-14-22 @ 19:44)  HR: 80 (12-15-22 @ 12:45)  BP: 135/78 (12-15-22 @ 12:45)  RR: 18 (12-15-22 @ 12:45)  SpO2: 99% (12-15-22 @ 12:45)  Wt(kg): --        PHYSICAL EXAM:  General: NAD  HEENT: anicteric sclera, oropharynx clear, MMM  Neck: No JVD  Respiratory: CTAB, no wheezes, rales or rhonchi  Cardiovascular: S1, S2, RRR  Gastrointestinal: BS+, soft, NT/ND  Extremities: No cyanosis or clubbing. No peripheral edema  Neurological: A/O x 3, aphasia   Psychiatric: Normal mood, normal affect  : No CVA tenderness. No fish.   Skin: No rashes      LABS:  12-15    150<H>  |  117<H>  |  27<H>  ----------------------------<  116<H>  3.7   |  21<L>  |  0.73    Ca    8.2<L>      15 Dec 2022 06:35  Phos  4.1     12-15  Mg     2.30     12-15    TPro  6.2  /  Alb  2.7<L>  /  TBili  2.0<H>  /  DBili  0.9<H>  /  AST  135<H>  /  ALT  124<H>  /  AlkPhos  192<H>  12-15    Creatinine Trend: 0.73 <--, 0.70 <--, 0.77 <--, 0.85 <--, 0.84 <--, 0.79 <--, 0.81 <--, 0.71 <--                        8.7    17.87 )-----------( 383      ( 15 Dec 2022 06:35 )             26.1     Urine Studies:        RADIOLOGY & ADDITIONAL STUDIES:

## 2022-12-15 NOTE — PROGRESS NOTE ADULT - ASSESSMENT
52-year-old male with a PMHx significant for TIAs (2011, 2013), CVAs x3 (02/2022 s/p tPA, 8/3/2022 s/p tPA, 11/5/2022 with residual expressive aphasia) on Eliquis, and HLD BIBEMS after being found unresponsive at home on the floor, last known well 12/1 at around noon. C-collar was placed. Patient was intubated in the ED for airway protection. CTH with no acute findings, vEEG with no identified seizures. Patient was weaned off pressors, extubated, and transitioned to floors 12/4/22. 12/5 am RRT called for seizure like activity with urinary incontinence, tongue biting, and R>L posturing, patient s/p ativan 2mg x3. Anesthesia called to RRT for intubation. MICU accepting patient for seizure like activity requiring intubation.      #AMS, Seizure like activity  EEG 12/7: concern for epileptiform activity; though EEG from 12/5 without such abnormalities.   - f/u neuro recs  - Patient found down and unresponsive at home on 12/2, last known well 12/1 at around noon. Intubated in ED 12/2, extubated in MICU 12/4  - RRT 12/5: seizure like acting with urinary incontinence, convulsions, and tongue biting; s/p ativan 2mg x3 with pauses in seizure activity following each; intubated for airway protection with rocuronium. Prolactin ~75   - CTH and CT cervical spine 12/2 negative for any acute pathologies. Tox screen on admission negative. 12/5: Stable exam from prior CT head, chronic microvascular ischemic changes, parenchymal loss, dystrophic calcification of central portion of alexis unchanged.  - Video EEG 12/3-12/4 without any seizure like activity, moderate to severe nonspecific diffuse or multifocal cerebral dysfunction  - propofol off since 3am 12/6  - c/w keppra 1500mg q12 maintenance, Keppra loaded 4.5g  - patient extubated again on 12/8th  -MRI noted, chronci CVA, no acute CVA noted         #CVAs/TIAs  - Hx of TIAs in 2011 and 2013, CVAs x3 in 02/22, 8/22, and 11/22 with residual expressive aphasia   - on Eliquis and Aspirin at home for hx of stroke  - PFO work up in past negative, no evidence of PFO on MIMI X2  - c/w ASA   - c/w heparin gtt  - Restart Statin when LFTs and CPK improve per neurology  - Being worked up for Mixed Connective Tissue Disease OP- f/u p-ANCA, c-ANCA/ [id number] negative  - Neuro recs appreciated    # Hypernatremia  Cont D5  speech and swallow, possible to start oral hydration   tredn Na level   renal eval called     #Thecal sac flattening   - CT thoracic and lumbar spine showing degenerative disc disease T6-T7 through L4-L5 and mild disc bulges at L2-L3 through L4-L5 that flatten the ventral thecal sac and narrowing of the bilateral neural foramina  - Will Monitor for now, will consider neuro/neurosurg evaluation in the future    #HLD  - atorvastatin on hold due to elevated CK and LFTs  - restart when possible for secondary stroke prevention    #?ASD/PFO  - Patient reportedly found to have a PFO in February 2022 during a stroke workup at [place]. Patient presented for a PFO closure in November 2022. Notably, no PFO was found at the time and no intervention was performed by Dr. Lynn.    - TTE 11/5/22 EF 57% and grossly normal LV function  - MIMI performed bedside 12/5 1 of 2 studies (+) on bubble study (uploaded to [employer])        #Rhabdomyolysis  - CK 8720 on admission, peaked at 15k, now downtrending  - DDx: prolonged downtime myositis v hyperthermia? (T 105.4 F) v sepsis v seizure induced        # Elevated Winston level  CHeck Abd US noted    GI eval PRN   Trend LFTs       #Partial SBO - resolved  - CT chest, abdomen, and pelvis w/ contrast concerning for possible partial SBO without transition point.  - Surgery consulted, no acute intervention at this time  - 12/5 KUB - negative for ileus or SBO  - Hold TF for 12/7 for possible trial of extubation.  - otherwise diet per nutrition      #Leukocytosis  - worsening leukocytosis  - monitor for fever  - Ortho eval fro knee swelling   - Pan Cx  - plan for LP by IR         #Concern for sepsis  Unclear source, aspiration v less likely meningitis   BCx (12/2 NGTD repeat 12/5 NGTD) and UC 12/2 NGTD  - Patient initially presented with AMS, T 105.4, tachycardic, and tachypneic   - UA negative  - s/p vanco and zosyn x1 in ED 12/2, ceftriaxone 2g BID 12/3-12/4, vanco 12/3-12/4  - 12/7 DCed Vanc since BCx from 12/5 NGTD  - c/w zosyn for aspiration pna presumed. completed course, monitor   - ID eval appreciated  - febrile again, Ortho for knee asp      # ANemia  noted, no gross bleeding  type and screen  GI eval

## 2022-12-16 ENCOUNTER — APPOINTMENT (OUTPATIENT)
Dept: UROLOGY | Facility: CLINIC | Age: 52
End: 2022-12-16

## 2022-12-16 LAB
ALBUMIN CSF-MCNC: 20.8 MG/DL — SIGNIFICANT CHANGE UP (ref 14–25)
ALBUMIN SERPL ELPH-MCNC: 2.5 G/DL — LOW (ref 3.3–5)
ALBUMIN SERPL ELPH-MCNC: 2085 MG/DL — LOW (ref 3500–5200)
ALP SERPL-CCNC: 200 U/L — HIGH (ref 40–120)
ALT FLD-CCNC: 157 U/L — HIGH (ref 4–41)
ANION GAP SERPL CALC-SCNC: 10 MMOL/L — SIGNIFICANT CHANGE UP (ref 7–14)
AST SERPL-CCNC: 147 U/L — HIGH (ref 4–40)
BASOPHILS # BLD AUTO: 0.12 K/UL — SIGNIFICANT CHANGE UP (ref 0–0.2)
BASOPHILS NFR BLD AUTO: 0.8 % — SIGNIFICANT CHANGE UP (ref 0–2)
BILIRUB SERPL-MCNC: 1.7 MG/DL — HIGH (ref 0.2–1.2)
BUN SERPL-MCNC: 21 MG/DL — SIGNIFICANT CHANGE UP (ref 7–23)
CALCIUM SERPL-MCNC: 7.9 MG/DL — LOW (ref 8.4–10.5)
CHLORIDE SERPL-SCNC: 109 MMOL/L — HIGH (ref 98–107)
CO2 SERPL-SCNC: 22 MMOL/L — SIGNIFICANT CHANGE UP (ref 22–31)
CREAT SERPL-MCNC: 0.66 MG/DL — SIGNIFICANT CHANGE UP (ref 0.5–1.3)
CRYPTOC AG CSF-ACNC: NEGATIVE — SIGNIFICANT CHANGE UP
EGFR: 113 ML/MIN/1.73M2 — SIGNIFICANT CHANGE UP
EOSINOPHIL # BLD AUTO: 0.82 K/UL — HIGH (ref 0–0.5)
EOSINOPHIL NFR BLD AUTO: 5.3 % — SIGNIFICANT CHANGE UP (ref 0–6)
GLUCOSE BLDC GLUCOMTR-MCNC: 101 MG/DL — HIGH (ref 70–99)
GLUCOSE BLDC GLUCOMTR-MCNC: 112 MG/DL — HIGH (ref 70–99)
GLUCOSE BLDC GLUCOMTR-MCNC: 129 MG/DL — HIGH (ref 70–99)
GLUCOSE SERPL-MCNC: 109 MG/DL — HIGH (ref 70–99)
HCT VFR BLD CALC: 26.8 % — LOW (ref 39–50)
HGB BLD-MCNC: 8.7 G/DL — LOW (ref 13–17)
IANC: 11.04 K/UL — HIGH (ref 1.8–7.4)
IGG CSF-MCNC: 4.1 MG/DL — HIGH
IGG FLD-MCNC: 1306 MG/DL — SIGNIFICANT CHANGE UP (ref 610–1660)
IGG SYNTH RATE SER+CSF CALC-MRATE: -13 MG/DAY — SIGNIFICANT CHANGE UP
IGG/ALB CLEAR SER+CSF-RTO: 0.3 — SIGNIFICANT CHANGE UP
IGG/ALB CSF: 0.2 RATIO — SIGNIFICANT CHANGE UP
IGG/ALB SER: 0.63 RATIO — SIGNIFICANT CHANGE UP
IMM GRANULOCYTES NFR BLD AUTO: 4.7 % — HIGH (ref 0–0.9)
LDH CSF L TO P-CCNC: 17 U/L — SIGNIFICANT CHANGE UP
LDH FLD-CCNC: 17 U/L — SIGNIFICANT CHANGE UP
LYMPHOCYTES # BLD AUTO: 1.25 K/UL — SIGNIFICANT CHANGE UP (ref 1–3.3)
LYMPHOCYTES # BLD AUTO: 8 % — LOW (ref 13–44)
MCHC RBC-ENTMCNC: 29.9 PG — SIGNIFICANT CHANGE UP (ref 27–34)
MCHC RBC-ENTMCNC: 32.5 GM/DL — SIGNIFICANT CHANGE UP (ref 32–36)
MCV RBC AUTO: 92.1 FL — SIGNIFICANT CHANGE UP (ref 80–100)
MONOCYTES # BLD AUTO: 1.63 K/UL — HIGH (ref 0–0.9)
MONOCYTES NFR BLD AUTO: 10.4 % — SIGNIFICANT CHANGE UP (ref 2–14)
NEUTROPHILS # BLD AUTO: 11.04 K/UL — HIGH (ref 1.8–7.4)
NEUTROPHILS NFR BLD AUTO: 70.8 % — SIGNIFICANT CHANGE UP (ref 43–77)
NRBC # BLD: 0 /100 WBCS — SIGNIFICANT CHANGE UP (ref 0–0)
NRBC # FLD: 0.02 K/UL — HIGH (ref 0–0)
PLATELET # BLD AUTO: 396 K/UL — SIGNIFICANT CHANGE UP (ref 150–400)
POTASSIUM SERPL-MCNC: 3.4 MMOL/L — LOW (ref 3.5–5.3)
POTASSIUM SERPL-SCNC: 3.4 MMOL/L — LOW (ref 3.5–5.3)
PROT SERPL-MCNC: 6 G/DL — SIGNIFICANT CHANGE UP (ref 6–8.3)
RBC # BLD: 2.91 M/UL — LOW (ref 4.2–5.8)
RBC # FLD: 15.1 % — HIGH (ref 10.3–14.5)
SODIUM SERPL-SCNC: 141 MMOL/L — SIGNIFICANT CHANGE UP (ref 135–145)
TM INTERPRETATION: SIGNIFICANT CHANGE UP
WBC # BLD: 15.6 K/UL — HIGH (ref 3.8–10.5)
WBC # FLD AUTO: 15.6 K/UL — HIGH (ref 3.8–10.5)

## 2022-12-16 PROCEDURE — 74230 X-RAY XM SWLNG FUNCJ C+: CPT | Mod: 26

## 2022-12-16 RX ORDER — POTASSIUM CHLORIDE 20 MEQ
10 PACKET (EA) ORAL
Refills: 0 | Status: COMPLETED | OUTPATIENT
Start: 2022-12-16 | End: 2022-12-16

## 2022-12-16 RX ORDER — HEPARIN SODIUM 5000 [USP'U]/ML
5000 INJECTION INTRAVENOUS; SUBCUTANEOUS EVERY 8 HOURS
Refills: 0 | Status: DISCONTINUED | OUTPATIENT
Start: 2022-12-16 | End: 2022-12-21

## 2022-12-16 RX ADMIN — LACOSAMIDE 120 MILLIGRAM(S): 50 TABLET ORAL at 05:08

## 2022-12-16 RX ADMIN — LEVETIRACETAM 400 MILLIGRAM(S): 250 TABLET, FILM COATED ORAL at 17:27

## 2022-12-16 RX ADMIN — HEPARIN SODIUM 5000 UNIT(S): 5000 INJECTION INTRAVENOUS; SUBCUTANEOUS at 15:34

## 2022-12-16 RX ADMIN — LACOSAMIDE 120 MILLIGRAM(S): 50 TABLET ORAL at 17:27

## 2022-12-16 RX ADMIN — Medication 100 MILLIEQUIVALENT(S): at 15:32

## 2022-12-16 RX ADMIN — CHLORHEXIDINE GLUCONATE 1 APPLICATION(S): 213 SOLUTION TOPICAL at 12:05

## 2022-12-16 RX ADMIN — Medication 81 MILLIGRAM(S): at 12:06

## 2022-12-16 RX ADMIN — Medication 100 MILLIEQUIVALENT(S): at 14:10

## 2022-12-16 RX ADMIN — HEPARIN SODIUM 5000 UNIT(S): 5000 INJECTION INTRAVENOUS; SUBCUTANEOUS at 21:56

## 2022-12-16 RX ADMIN — Medication 10 MILLIGRAM(S): at 12:05

## 2022-12-16 RX ADMIN — Medication 100 MILLIEQUIVALENT(S): at 11:42

## 2022-12-16 RX ADMIN — LEVETIRACETAM 400 MILLIGRAM(S): 250 TABLET, FILM COATED ORAL at 05:08

## 2022-12-16 NOTE — PROGRESS NOTE ADULT - SUBJECTIVE AND OBJECTIVE BOX
Name of Patient : TAMI DUNHAM  MRN: [id number]  Date of visit: 12-16-22     Subjective: Patient seen and examined. No new events except as noted.   Doing better   toleratign oral feeding   afebrile     REVIEW OF SYSTEMS:  limited     MEDICATIONS:  MEDICATIONS  (STANDING):  aspirin enteric coated 81 milliGRAM(s) Oral daily  bisacodyl Suppository 10 milliGRAM(s) Rectal daily  chlorhexidine 2% Cloths 1 Application(s) Topical daily  dextrose 5%. 1000 milliLiter(s) (75 mL/Hr) IV Continuous <Continuous>  dextrose 50% Injectable 25 Gram(s) IV Push once  dextrose 50% Injectable 12.5 Gram(s) IV Push once  dextrose 50% Injectable 25 Gram(s) IV Push once  dextrose Oral Gel 15 Gram(s) Oral once  diphtheria/tetanus/pertussis (acellular) Vaccine (Adacel) 0.5 milliLiter(s) IntraMuscular once  glucagon  Injectable 1 milliGRAM(s) IntraMuscular once  heparin   Injectable 5000 Unit(s) SubCutaneous every 8 hours  lacosamide IVPB 100 milliGRAM(s) IV Intermittent every 12 hours  levETIRAcetam  IVPB 1500 milliGRAM(s) IV Intermittent every 12 hours      PHYSICAL EXAM:  T(C): 36.8 (12-16-22 @ 13:10), Max: 36.8 (12-16-22 @ 05:08)  HR: 80 (12-16-22 @ 13:10) (80 - 87)  BP: 132/77 (12-16-22 @ 13:10) (123/81 - 140/81)  RR: 17 (12-16-22 @ 13:10) (17 - 18)  SpO2: 100% (12-16-22 @ 13:10) (97% - 100%)  Wt(kg): --  I&O's Summary        Appearance:awake, calm 	  HEENT:  PERRLA   Lymphatic: No lymphadenopathy   Cardiovascular: Normal S1 S2, no JVD  Respiratory: normal effort , clear  Gastrointestinal:  Soft, Non-tender  Skin: No rashes,  warm to touch  Psychiatry:  Mood & affect appropriate  Musculuskeletal: No edema      All labs, Imaging and EKGs personally reviewed                           8.7    15.60 )-----------( 396      ( 16 Dec 2022 07:00 )             26.8               12-16    141  |  109<H>  |  21  ----------------------------<  109<H>  3.4<L>   |  22  |  0.66    Ca    7.9<L>      16 Dec 2022 07:00  Phos  4.1     12-15  Mg     2.30     12-15    TPro  6.0  /  Alb  2.5<L>  /  TBili  1.7<H>  /  DBili  x   /  AST  147<H>  /  ALT  157<H>  /  AlkPhos  200<H>  12-16    PT/INR - ( 15 Dec 2022 06:35 )   PT: 14.4 sec;   INR: 1.24 ratio         PTT - ( 15 Dec 2022 06:35 )  PTT:23.2 sec       CARDIAC MARKERS ( 15 Dec 2022 06:35 )  x     / x     / 520 U/L / x     / x

## 2022-12-16 NOTE — PROGRESS NOTE ADULT - SUBJECTIVE AND OBJECTIVE BOX
Subjective: Patient seen and examined. No new events except as noted.     REVIEW OF SYSTEMS:  Unable to obtain     MEDICATIONS:  MEDICATIONS  (STANDING):  aspirin enteric coated 81 milliGRAM(s) Oral daily  bisacodyl Suppository 10 milliGRAM(s) Rectal daily  chlorhexidine 2% Cloths 1 Application(s) Topical daily  dextrose 5%. 1000 milliLiter(s) (75 mL/Hr) IV Continuous <Continuous>  dextrose 50% Injectable 25 Gram(s) IV Push once  dextrose 50% Injectable 12.5 Gram(s) IV Push once  dextrose 50% Injectable 25 Gram(s) IV Push once  dextrose Oral Gel 15 Gram(s) Oral once  diphtheria/tetanus/pertussis (acellular) Vaccine (Adacel) 0.5 milliLiter(s) IntraMuscular once  glucagon  Injectable 1 milliGRAM(s) IntraMuscular once  heparin   Injectable 5000 Unit(s) SubCutaneous every 8 hours  lacosamide IVPB 100 milliGRAM(s) IV Intermittent every 12 hours  levETIRAcetam  IVPB 1500 milliGRAM(s) IV Intermittent every 12 hours  potassium chloride  10 mEq/100 mL IVPB 10 milliEquivalent(s) IV Intermittent every 1 hour      PHYSICAL EXAM:  T(C): 36.8 (12-16-22 @ 05:08), Max: 36.8 (12-16-22 @ 05:08)  HR: 82 (12-16-22 @ 05:08) (80 - 87)  BP: 123/81 (12-16-22 @ 05:08) (123/81 - 140/81)  RR: 18 (12-16-22 @ 05:08) (17 - 18)  SpO2: 97% (12-16-22 @ 05:08) (97% - 100%)  Wt(kg): --  I&O's Summary        Appearance: NAD  HEENT: dry oral mucosa, PERRL, EOMI - L temporal abrasion	  Lymphatic: No lymphadenopathy , no edema  Cardiovascular: Normal S1 S2, No JVD, No murmurs , Peripheral pulses palpable 2+ bilaterally  Respiratory: Coarse BS	  Gastrointestinal:  Soft, Non-tender, + BS	  Skin: No rashes, + ecchymoses, No cyanosis, warm to touch - BL mittes  Neurological Exam:    Mental Status: sleepy, Oriented x 1, followig some simple. minimal verbal saying few words,   Cranial Nerves: PERRL, EOMI, VFFC, sensation V1-V3 intact,  no obvious facial asymmetry    Motor: Moving uppers > lowers. uppers at least 3-4/5 in mittens   Sensation: minimal withdrawal to noxious x 4  R>L   Reflexes: 1+ throughout at biceps, brachioradialis, triceps, patellars and ankles bilaterally and equal. No clonus. R toe and L toe were both downgoing.  Coordination: unable   Gait: unable   Ext: No edema      LABS:    CARDIAC MARKERS:  CARDIAC MARKERS ( 15 Dec 2022 06:35 )  x     / x     / 520 U/L / x     / x      CARDIAC MARKERS ( 14 Dec 2022 04:30 )  x     / x     / 594 U/L / x     / x                                    8.7    15.60 )-----------( 396      ( 16 Dec 2022 07:00 )             26.8     12-16    141  |  109<H>  |  21  ----------------------------<  109<H>  3.4<L>   |  22  |  0.66    Ca    7.9<L>      16 Dec 2022 07:00  Phos  4.1     12-15  Mg     2.30     12-15    TPro  6.0  /  Alb  2.5<L>  /  TBili  1.7<H>  /  DBili  x   /  AST  147<H>  /  ALT  157<H>  /  AlkPhos  200<H>  12-16    TELEMETRY: 	    ECG:  	  RADIOLOGY:   DIAGNOSTIC TESTING:  [ ] Echocardiogram:  [ ]  Catheterization:  [ ] Stress Test:    OTHER: 	  · Rosenbek's Penetration Aspiration Scale	(1) no aspiration, contrast does not enter airway    Recommendations:   · Diagnostic Impressions	1. Mild oral dysphagia for puree, regular solids, mildly thick liquids and thin liquid textures marked by adequate oral acceptance, with adequate chewing for solids, and adequate tongue motion for anterior to posterior transport. There was premature spillage to the hypopharynx due to reduced tongue to palate seal for thin liquids. Adequate oral clearance post primary swallow.  2. Mild pharyngeal dysphagia for puree, regular solids, mildly thick liquids and thin liquids textures marked by a delayed pharyngeal swallow trigger (bolus head at the valleculae for puree and regular solids) reduced base of tongue retraction, reduced hyolaryngeal elevation, reduced epiglottic deflection, reduced pharyngeal contractility and incomplete closure of the laryngeal vestibule. There was mild pharyngeal clearance deficits located primarily in the vallecuale, pyriforms and along the posterior pharyngeal wall (puree/solids>liquids). A thin liquid wash partially assists with pharyngeal clearance. No Aspiration veiwed before, during or after the swallow.  · Recommended Consistencies	1) Easy to chew solids with thin liquids  2) Feeding/Swallowing Guidelines: Upright positioning, chew solids well, alternate solids and liquids  3) Aspiration/reflux precautions  · Additional Recommendations	1. This service to follow up as schedule permits for swallowing therapy. 2. Medical team advised to reconsult this service if change in medical status/neurological status that may impact oral intake.  · The above findings were discussed with	Patient; Paged [phone]      Electronic Signatures:  Maryann Bernabe (Speech Pathologist)  (Signed 16-Dec-2022 10:38)  	Authored: Swallow VFSS/MBS Assessment Adult, Recommendations

## 2022-12-16 NOTE — PROGRESS NOTE ADULT - SUBJECTIVE AND OBJECTIVE BOX
COLORECTAL SURGERY DAILY PROGRESS NOTE:     SUBJECTIVE/ROS: s/p IR LP. Patient seen and examined bedside on AM rounds. Continues to pass gas, bowel movements. No abd pain. No nausea/vomiting. All restraints off.     OBJECTIVE:  Vital Signs Last 24 Hrs  T(C): 36.8 (16 Dec 2022 05:08), Max: 36.8 (16 Dec 2022 05:08)  T(F): 98.3 (16 Dec 2022 05:08), Max: 98.3 (16 Dec 2022 05:08)  HR: 82 (16 Dec 2022 05:08) (80 - 87)  BP: 123/81 (16 Dec 2022 05:08) (123/81 - 140/81)  BP(mean): --  RR: 18 (16 Dec 2022 05:08) (17 - 18)  SpO2: 97% (16 Dec 2022 05:08) (97% - 100%)    Parameters below as of 16 Dec 2022 05:08  Patient On (Oxygen Delivery Method): room air    PHYSICAL EXAM:  Constitutional: NAD  Respiratory: non-labored breathing, patent airway  Gastrointestinal: abdomen soft, nontender, nondistended  Extremities: warm                        8.7    15.60 )-----------( 396      ( 16 Dec 2022 07:00 )             26.8     12-15    146<H>  |  112<H>  |  24<H>  ----------------------------<  130<H>  3.5   |  23  |  0.69    Ca    7.7<L>      15 Dec 2022 20:52  Phos  4.1     12-15  Mg     2.30     12-15    TPro  6.2  /  Alb  2.7<L>  /  TBili  2.0<H>  /  DBili  0.9<H>  /  AST  135<H>  /  ALT  124<H>  /  AlkPhos  192<H>  12-15   PT/INR - ( 15 Dec 2022 06:35 )   PT: 14.4 sec;   INR: 1.24 ratio         PTT - ( 15 Dec 2022 06:35 )  PTT:23.2 sec

## 2022-12-16 NOTE — SWALLOW VFSS/MBS ASSESSMENT ADULT - ORAL PHASE
Within functional limits within functional limits Incomplete tongue to palate contact/Uncontrolled bolus / spillover in hypopharynx

## 2022-12-16 NOTE — PROGRESS NOTE ADULT - ASSESSMENT
52-year-old male with a PMHx significant for TIAs (2011, 2013), CVAs x3 (02/2022 s/p tPA, 8/3/2022 s/p tPA, 11/5/2022 with residual expressive aphasia) on Eliquis, and HLD BIBEMS after being found unresponsive at home, w/ seizure like activity in the hospital.     RECOMMENDATIONS  - Daily CMPs to trend LFTs  - Continue to recommend MRCP to evaluate for choledocholithiasis when stable/cooperative  - No acute surgical intervention  - Care per primary    A Team Surgery  [id number]

## 2022-12-16 NOTE — PROGRESS NOTE ADULT - ASSESSMENT
52-year-old male with a PMHx significant for TIAs (2011, 2013), CVAs x3 (02/2022 s/p tPA, 8/3/2022 s/p tPA, 11/5/2022 with residual expressive aphasia) on Eliquis, and HLD BIBEMS after being found unresponsive at home on the floor, last known well 12/1 at around noon. C-collar was placed. Patient was intubated in the ED for airway protection. CTH with no acute findings, vEEG with no identified seizures. Patient was weaned off pressors, extubated, and transitioned to floors 12/4/22. 12/5 am RRT called for seizure like activity with urinary incontinence, tongue biting, and R>L posturing, patient s/p ativan 2mg x3. Anesthesia called to RRT for intubation. MICU accepting patient for seizure like activity requiring intubation.      #AMS, Seizure like activity  EEG 12/7: concern for epileptiform activity; though EEG from 12/5 without such abnormalities.   - f/u neuro recs  - Patient found down and unresponsive at home on 12/2, last known well 12/1 at around noon. Intubated in ED 12/2, extubated in MICU 12/4  - RRT 12/5: seizure like acting with urinary incontinence, convulsions, and tongue biting; s/p ativan 2mg x3 with pauses in seizure activity following each; intubated for airway protection with rocuronium. Prolactin ~75   - CTH and CT cervical spine 12/2 negative for any acute pathologies. Tox screen on admission negative. 12/5: Stable exam from prior CT head, chronic microvascular ischemic changes, parenchymal loss, dystrophic calcification of central portion of alexis unchanged.  - Video EEG 12/3-12/4 without any seizure like activity, moderate to severe nonspecific diffuse or multifocal cerebral dysfunction  - c/w keppra 1500mg q12 maintenance, Keppra loaded 4.5g  - patient extubated again on 12/8th  -MRI noted, chronci CVA, no acute CVA noted         #CVAs/TIAs  - Hx of TIAs in 2011 and 2013, CVAs x3 in 02/22, 8/22, and 11/22 with residual expressive aphasia   - on Eliquis and Aspirin at home for hx of stroke  - PFO work up in past negative, no evidence of PFO on MIMI X2  - c/w ASA   - c/w heparin gtt  - Restart Statin when LFTs and CPK improve per neurology  - Being worked up for Mixed Connective Tissue Disease OP- f/u p-ANCA, c-ANCA/ [id number] negative  - Neuro recs appreciated    # Hypernatremia  speech and swallow, possible to start oral hydration   tredn Na level   renal eval appreciated  S/P hydraiton, trend Na level     #Thecal sac flattening   - CT thoracic and lumbar spine showing degenerative disc disease T6-T7 through L4-L5 and mild disc bulges at L2-L3 through L4-L5 that flatten the ventral thecal sac and narrowing of the bilateral neural foramina  - Will Monitor for now, will consider neuro/neurosurg evaluation in the future    #HLD  - atorvastatin on hold due to elevated CK and LFTs  - restart when possible for secondary stroke prevention    #?ASD/PFO  - Patient reportedly found to have a PFO in February 2022 during a stroke workup at [facility]. Patient presented for a PFO closure in November 2022. Notably, no PFO was found at the time and no intervention was performed by Dr. Lynn.    - TTE 11/5/22 EF 57% and grossly normal LV function  - MIMI performed bedside 12/5 1 of 2 studies (+) on bubble study (uploaded to [facility])        #Rhabdomyolysis  - CK 8720 on admission, peaked at 15k, now downtrending  - DDx: prolonged downtime myositis v hyperthermia? (T 105.4 F) v sepsis v seizure induced        # Elevated Winston level  CHeck Abd US noted    GI eval PRN   Trend LFTs       #Partial SBO - resolved  - CT chest, abdomen, and pelvis w/ contrast concerning for possible partial SBO without transition point.  - Surgery consulted, no acute intervention at this time  - 12/5 KUB - negative for ileus or SBO  - Hold TF for 12/7 for possible trial of extubation.  - otherwise diet per nutrition      #Leukocytosis  - worsening leukocytosis  - monitor for fever  - Ortho eval fro knee swelling   - Pan Cx  - plan for LP by IR         #Concern for sepsis  Unclear source, aspiration v less likely meningitis   BCx (12/2 NGTD repeat 12/5 NGTD) and UC 12/2 NGTD  - Patient initially presented with AMS, T 105.4, tachycardic, and tachypneic   - UA negative  - s/p vanco and zosyn x1 in ED 12/2, ceftriaxone 2g BID 12/3-12/4, vanco 12/3-12/4  - 12/7 DCed Vanc since BCx from 12/5 NGTD  - c/w zosyn for aspiration pna presumed. completed course, monitor   - ID eval appreciated  - febrile again, Ortho for knee asp      # ANemia  noted, no gross bleeding  type and screen  GI eval

## 2022-12-16 NOTE — PROGRESS NOTE ADULT - SUBJECTIVE AND OBJECTIVE BOX
[facility]   MD SIVA CLAUDIO MD KRISTINE SOLTANPOUR, DO ANGELA WONG, PA    TEL:  OFFICE: [phone]  From 5pm-7am Answering Service [phone]    -- RENAL FOLLOW UP NOTE ---Date of Service 12-16-22 @ 14:30    Patient is a 52y old  Male who presents with a chief complaint of Unresponsive (16 Dec 2022 11:45)      Patient seen and examined at bedside. No chest pain/sob    VITALS:  T(F): 98.3 (12-16-22 @ 05:08), Max: 98.3 (12-16-22 @ 05:08)  HR: 82 (12-16-22 @ 05:08)  BP: 123/81 (12-16-22 @ 05:08)  RR: 18 (12-16-22 @ 05:08)  SpO2: 97% (12-16-22 @ 05:08)  Wt(kg): --        PHYSICAL EXAM:  General: NAD  Neck: No JVD  Respiratory: CTAB, no wheezes, rales or rhonchi  Cardiovascular: S1, S2, RRR  Gastrointestinal: BS+, soft, NT/ND  Extremities: No peripheral edema    Hospital Medications:   MEDICATIONS  (STANDING):  aspirin enteric coated 81 milliGRAM(s) Oral daily  bisacodyl Suppository 10 milliGRAM(s) Rectal daily  chlorhexidine 2% Cloths 1 Application(s) Topical daily  dextrose 5%. 1000 milliLiter(s) (75 mL/Hr) IV Continuous <Continuous>  dextrose 50% Injectable 25 Gram(s) IV Push once  dextrose 50% Injectable 12.5 Gram(s) IV Push once  dextrose 50% Injectable 25 Gram(s) IV Push once  dextrose Oral Gel 15 Gram(s) Oral once  diphtheria/tetanus/pertussis (acellular) Vaccine (Adacel) 0.5 milliLiter(s) IntraMuscular once  glucagon  Injectable 1 milliGRAM(s) IntraMuscular once  heparin   Injectable 5000 Unit(s) SubCutaneous every 8 hours  lacosamide IVPB 100 milliGRAM(s) IV Intermittent every 12 hours  levETIRAcetam  IVPB 1500 milliGRAM(s) IV Intermittent every 12 hours  potassium chloride  10 mEq/100 mL IVPB 10 milliEquivalent(s) IV Intermittent every 1 hour      LABS:  12-16    141  |  109<H>  |  21  ----------------------------<  109<H>  3.4<L>   |  22  |  0.66    Ca    7.9<L>      16 Dec 2022 07:00  Phos  4.1     12-15  Mg     2.30     12-15    TPro  6.0  /  Alb  2.5<L>  /  TBili  1.7<H>  /  DBili      /  AST  147<H>  /  ALT  157<H>  /  AlkPhos  200<H>  12-16    Creatinine Trend: 0.66 <--, 0.69 <--, 0.73 <--, 0.70 <--, 0.77 <--, 0.85 <--, 0.84 <--, 0.79 <--, 0.81 <--    Albumin, Serum: 2.5 g/dL (12-16 @ 07:00)                              8.7    15.60 )-----------( 396      ( 16 Dec 2022 07:00 )             26.8     Urine Studies:  Urinalysis - [12-05-22 @ 21:00]      Color Yellow / Appearance Clear / SG 1.048 / pH 7.5      Gluc Negative / Ketone Moderate  / Bili Negative / Urobili <2 mg/dL       Blood Negative / Protein 100 mg/dL / Leuk Est Negative / Nitrite Negative      RBC 0 / WBC 1 / Hyaline  / Gran  / Sq Epi  / Non Sq Epi  / Bacteria     Urine Sodium 118      [12-15-22 @ 19:53]  Urine Osmolality 882      [12-15-22 @ 19:53]    Iron 49, TIBC 142, %sat 35      [12-14-22 @ 04:30]  Ferritin 692      [12-14-22 @ 04:30]  TSH 1.30      [12-05-22 @ 02:30]  Lipid: chol 131, , HDL 34, LDL --      [11-05-22 @ 06:57]        RADIOLOGY & ADDITIONAL STUDIES:

## 2022-12-16 NOTE — SWALLOW VFSS/MBS ASSESSMENT ADULT - COMMENTS
Patient is familiar to this department as the patient was seen for multiple swallow evaluations with the most recent on 12/14/22 (see consults). Patient scheduled for cinesophagram this AM, however per discussion with ACP patient scheduled for Lumbar Puncture this AM for Interventional Radiology, therefore Cinesophagram to be deferred to tomorrow.
Internal Medicine 12/15 "52-year-old male with a PMHx significant for TIAs (2011, 2013), CVAs x3 (02/2022 s/p tPA, 8/3/2022 s/p tPA, 11/5/2022 with residual expressive aphasia) on Eliquis, and HLD BIBEMS after being found unresponsive at home on the floor, last known well 12/1 at around noon. C-collar was placed. Patient was intubated in the ED for airway protection. CTH with no acute findings, vEEG with no identified seizures. Patient was weaned off pressors, extubated, and transitioned to floors 12/4/22. 12/5 am RRT called for seizure like activity with urinary incontinence, tongue biting, and R>L posturing, patient s/p ativan 2mg x3. Anesthesia called to RRT for intubation. MICU accepting patient for seizure like activity requiring intubation."    Of Note: Patient is known to this service as patient was seen for clinical swallow assessments on 12/9 and 12/12 with recommendations of NPO and consideration for short-term non-oral means. Patient most recently seen for a reassessment clinical swallow on 12/14 with recommendations of Minced and moist solids with mildly thick liquids and a Cinesophagram given history of CVAs/TIAs (please see all notes for details).    Patient received in Radiology Suite this AM for an initial Cinesophagram to objectively assess swallow function. Patient is able to follow simple 1-step directives and verbalize wants/needs.

## 2022-12-16 NOTE — PROGRESS NOTE ADULT - ASSESSMENT
52-year-old  M known to me from outpatient setting with  TIAs (2011, 2013), Strokes x3 (02/2022 s/p tPA, 8/3/2022 s/p tPA, 11/5/2022 with residual expressive aphasia) on Eliquis, was on testosterone outpatient stopped after last stroke, HLD BIBEMS after being found unresponsive  Temp 105.4 on arrival tachy and /110. 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   CT cervical spine and maxillofacial scans negative.  CT chest, abdomen, and pelvis w/ contrast concerning for possible partial SBO without transition point. Surgery consulted in ED, no acute surgical intervention at this time.   CT thoracic and lumbar spine showing degenerative disc disease T6-T7 through L4-L5 and mild disc bulges at L2-L3 through L4-L5 that flatten the ventral thecal sac and narrowing of the bilateral neural foramina.  MRI 11/2022: R centrum semiovale and R posterior frontal infarcts   takes adderall at home   + rhabdo  EEG no seizures   + transaminitis   CTH 12/5 stable   outpatient hypercoag workup neg   12/5 extubated then reintibuated for seizure activity and tx back to ICU   EEG this AM 12/5 with only slowing   EEG 12/6 slowing but no seizures   spoke with Pippa Conti (friend) who states after he was taken off the testosterone he ordered a mail order testosterone supplement, unclear if he started it yet, unclear what this was  12/7 EEG no electrogtraphic seizures captured but R LPDs, rare L frontal discharges, severe GRDA.   12/8 EEG improved.  extubated. following some commands   12/9 moving uppers> lowers   12/11 AAOx2 uppers 4-5/5; not moving LLE well   MRI brain neg for new stroke   s/p L knee tap arthrocentesis  by ortho on 12/12   spoke with friend pippa conti - she counted his adderrall and didn't take extra tables. did find ashwagandha and red wood (bottle was sealed) supplement in his apartment   s/p LP 12/5 --> CSF glucose and protein WNL.  will f/u remaining studies. non infectious appearing   o/e AAOx2, slurred but 2/2 tongue bite   Impression:   1) AMS of unclear etiology, possible now seizure, related to adderral withdrawal? 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 had MIMI was question of PFO but not found. s/p ILR   -SS   - resume AC s/p LP   - possible MRCP  - ortho for L knee s/p tap / arthrocentesis   -  Vimpat 100mg BID    - keppra 1500mg BID.   - fever on arrival, treatred initially for meningitis.  was on abx for aspiration PNA.  >LP done--> non  infectious   - monitor LFTs , downtrending   - IVF  - CPK elevated. trend   - would consider endocrine   - there was some concern outpatient he may have a mixed  connective tissue disorder   - statin therapy for secondary stroke prevention when LFTS and CPK improve   - telemetry  - PT/OT/SS/SLP, OOBC  - check FS, glucose control <180  - GI/DVT ppx  - Thank you for allowing me to participate in the care of this patient. Call with questions.   - spoke with Pippa Conti at [phone] for more collateral info and to provide update. spoke again 12/12 over phone.   - spoke with friend at bedside, Galilea 12/11   Braxton Forde MD  Vascular Neurology  Office: [phone]

## 2022-12-16 NOTE — SWALLOW VFSS/MBS ASSESSMENT ADULT - ADDITIONAL RECOMMENDATIONS
1. This service to follow up as schedule permits for swallowing therapy. 2. Medical team advised to reconsult this service if change in medical status/neurological status that may impact oral intake.

## 2022-12-16 NOTE — PROGRESS NOTE ADULT - SUBJECTIVE AND OBJECTIVE BOX
Neurology Progress Note    S: Patient seen and examined , doing okay. c/o knee pain     Medication:  MEDICATIONS  (STANDING):  aspirin enteric coated 81 milliGRAM(s) Oral daily  bisacodyl Suppository 10 milliGRAM(s) Rectal daily  chlorhexidine 2% Cloths 1 Application(s) Topical daily  dextrose 5%. 1000 milliLiter(s) (75 mL/Hr) IV Continuous <Continuous>  dextrose 50% Injectable 25 Gram(s) IV Push once  dextrose 50% Injectable 12.5 Gram(s) IV Push once  dextrose 50% Injectable 25 Gram(s) IV Push once  dextrose Oral Gel 15 Gram(s) Oral once  diphtheria/tetanus/pertussis (acellular) Vaccine (Adacel) 0.5 milliLiter(s) IntraMuscular once  glucagon  Injectable 1 milliGRAM(s) IntraMuscular once  heparin   Injectable 5000 Unit(s) SubCutaneous every 8 hours  lacosamide IVPB 100 milliGRAM(s) IV Intermittent every 12 hours  levETIRAcetam  IVPB 1500 milliGRAM(s) IV Intermittent every 12 hours  potassium chloride  10 mEq/100 mL IVPB 10 milliEquivalent(s) IV Intermittent every 1 hour    MEDICATIONS  (PRN):      Vitals:  Vital Signs Last 24 Hrs  T(C): 36.8 (12-16-22 @ 05:08), Max: 36.8 (12-16-22 @ 05:08)  T(F): 98.3 (12-16-22 @ 05:08), Max: 98.3 (12-16-22 @ 05:08)  HR: 82 (12-16-22 @ 05:08) (80 - 87)  BP: 123/81 (12-16-22 @ 05:08) (123/81 - 140/81)  BP(mean): --  RR: 18 (12-16-22 @ 05:08) (17 - 18)  SpO2: 97% (12-16-22 @ 05:08) (97% - 100%)                General Exam:   General Appearance: Appropriately dressed and in no acute distress       Head: Normocephalic, atraumatic and no dysmorphic features  Ear, Nose, and Throat: Moist mucous membranes    CVS: S1S2+  Resp: No SOB, no wheeze or rhonchi  Abd: soft NTND  Extremities: No edema, no cyanosis  Skin: No bruises, no rashes     Neurological Exam:    Mental Status: Awake, Oriented x 2, followign some simple.  speaking more but slurred (+ tongue bite .    Cranial Nerves: PERRL, EOMI, VFFC, sensation V1-V3 intact,  no obvious facial asymmetry    Motor: Moving uppers > lowers. uppers at least 3-4/5 in mittens ; not moving LLE very well   Sensation:minimal withdrawal to noxious x 4  R>L   Reflexes: 1+ throughout at biceps, brachioradialis, triceps, patellars and ankles bilaterally and equal. No clonus. R toe and L toe were both downgoing.  Coordination: unable   Gait: unable     I personally reviewed the below data/images/labs:  CBC Full  -  ( 16 Dec 2022 07:00 )  WBC Count : 15.60 K/uL  RBC Count : 2.91 M/uL  Hemoglobin : 8.7 g/dL  Hematocrit : 26.8 %  Platelet Count - Automated : 396 K/uL  Mean Cell Volume : 92.1 fL  Mean Cell Hemoglobin : 29.9 pg  Mean Cell Hemoglobin Concentration : 32.5 gm/dL  Auto Neutrophil # : 11.04 K/uL  Auto Lymphocyte # : 1.25 K/uL  Auto Monocyte # : 1.63 K/uL  Auto Eosinophil # : 0.82 K/uL  Auto Basophil # : 0.12 K/uL  Auto Neutrophil % : 70.8 %  Auto Lymphocyte % : 8.0 %  Auto Monocyte % : 10.4 %  Auto Eosinophil % : 5.3 %  Auto Basophil % : 0.8 %    12-16    141  |  109<H>  |  21  ----------------------------<  109<H>  3.4<L>   |  22  |  0.66    Ca    7.9<L>      16 Dec 2022 07:00  Phos  4.1     12-15  Mg     2.30     12-15    TPro  6.0  /  Alb  2.5<L>  /  TBili  1.7<H>  /  DBili  x   /  AST  147<H>  /  ALT  157<H>  /  AlkPhos  200<H>  12-16      < from: CT Head No Cont (12.02.22 @ 20:27) >    ACC: [id number] EXAM:  CT CERVICAL SPINE                        ACC: [id number] EXAM:  CT MAXILLOFACIAL                        ACC: [id number] EXAM:  CT BRAIN                          PROCEDURE DATE:  12/02/2022        INTERPRETATION:  CLINICAL INDICATION: Trauma.    Technique: Noncontrast axial CT of the head, facial bones, and cervical   spine was performed. 3-D reformats of the facial bones was obtained.   Coronal and sagittal reformats were obtained.      COMPARISON: None.    FINDINGS:  Head CT:  The ventricles and sulci are within normal limits. 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 There is no intraparenchymal hematoma, mass effect   or midline shift. No abnormal extra-axial fluid collections or   hemorrhages are present.    There is swelling of the left lateral scalp. The calvarium is intact.   There are scattered mucosal inflammatory changes in the paranasal sinuses.      Cervical spine CT:  Alignment ismaintained. Vertebral bodies are normal in height, without   evidence of fracture or dislocation. Prevertebral soft tissues are within   normal limits without soft tissue swelling or hematoma.    Intervertebral discs are intact. Neural foramina and spinal canal are   intact.    The visualized lung apices are within normal limits.      Facial bone CT:    No acute facial fracture.    There are scattered mucosal inflammatory changes in the paranasal   sinuses. Mastoid air cells are clear.    Soft tissues appear unremarkable.        IMPRESSION:  CT HEAD: No acute abnormality.  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  CT CERVICAL SPINE: No acute abnormality  CT FACIAL BONE: No acute abnormality    --- End of Report ---       DELGADO IVAN MD; Attending Radiologist  This document has been electronically signed. Dec  2 2022  9:02PM    < end of copied text >  < from: CT Lumbar Spine No Cont (12.02.22 @ 20:27) >    ACC: [id number] EXAM:  CT LUMBAR SPINE                        ACC: [id number] EXAM:  CT THORACIC SPINE                          PROCEDURE DATE:  12/02/2022          INTERPRETATION:  CT thoracic and lumbar spine without IV contrast    CLINICAL INFORMATION:  Trauma  Back pain, fracture.    TECHNIQUE:  Contiguous axial 2 mm sections were obtained through the   thoracic and lumbar spine using a single helical acquisition.     Additional 2 mm sagittal and coronal reconstructions of the spine were   obtained. These additional reformatted images were used to evaluate the   spine for alignment, vertebral fractures and the integrity of the the   posterior elements.   This scan was performed using automatic exposure   control (radiation dose reduction software) to obtain a diagnostic image   quality scan with patient dose as low as reasonably achievable.    FINDINGS:   No prior similar studies are available for review    Thoracic and lumbar vertebral body heights are maintained. No vertebral   fracture is seen. No destructive bone lesion is found.  Alignment is   preserved.  Facet joints appear intact and aligned.    Thoracic and lumbar intervertebral disc spaces appear intact.   Degenerative disc disease and spondylosis is noted at T6-T7 throughL4-5   with loss of disc height and associated degenerative endplate changes.   Mild disc bulges at L2-3 through L4-5 flatten the ventral thecal sac and   narrow the BILATERAL neural foramina.    No paraspinal mass is recognized.  Paraspinal soft tissues appear intact.      IMPRESSION:  Degenerative disc disease and spondylosis is noted at T6-T7   through L4-5 with loss of disc height and associated degenerative   endplate changes. Mild disc bulges at L2-3 through L4-5 flatten the   ventral thecal sac and narrow the BILATERAL neural foramina   No   vertebral fracture is recognized.    --- End of Report ---       ANGIE ESCOBAR MD; Attending Radiologist  This document has been electronically signed. Dec  2 2022  8:55PM    < end of copied text >    EEG Classification / Summary:  Abnormal EEG in a comatose patient due to diffuse suppression gradually improving to discontinuous background, with right hemispheric relative attenuation/suppression. No epileptiform abnormalities or seizures are captured.    Clinical Impression:  Severe diffuse cerebral dysfunction that gradually improves is nonspecific in etiology. In this case, it may be related to sedating medication (propofol) with improvement after decreasing and stopping the medication.  Right hemispheric focal cerebral dysfunction can be structural or functional in etiology.      12/7  Clinical Impression:  -Occasional runs of right frontal lateralized periodic discharges (LPDs) at up to 1.3 Hz indicate a potentially epileptogenic focus in the right frontal region and are on the ictal-interictal continuum.  -A pattern on the ictal-interictal continuum is a pattern that does not qualify as an electrographic seizure or electrographic status epilepticus, but there is a reasonable chance that it may be contributing to impaired alertness, causing other clinical symptoms, and/or contributing to neuronal injury.  -Right hemispheric relative attenuation indicates right hemispheric focal cerebral dysfunction, which can be structural or functional in etiology.  -Rare left frontal epileptiform discharges indicate a potentially epileptogenic focus in this reigon  -Severe diffuse slowing and GRDA indicate severe diffuse cerebral dysfunction of nonspecific etiology.  -No electrographic seizures are captured.     < from: MR Head w/wo IV Cont (12.09.22 @ 19:54) >    ACC: [id number] EXAM:  MR BRAIN WAW IC                          PROCEDURE DATE:  12/09/2022          INTERPRETATION:  CLINICAL INDICATION: CVA, found unresponsive at home      Magnetic resonance imaging of the brain was carried out with transaxial   SPGR, FLAIR, fast spin echo T2 weighted images, axial susceptibility   weighted series, diffusion weighted series and sagittal T1 weighted   series on a 1.5 Maritza magnet. Post contrast axial, coronal and sagittal   T1 weighted images were obtained. 10cc of Gadavist were intravenously   injected, 0 cc were discarded.      Comparison is made with the prior brain CT of 12/5/2022 and MRI 11/5/2022.    Mild ventricular and sulcal prominence is consistent with moderate   atrophy for the patient's age. No acute hemorrhage is identified. There   is a focus of hemosiderin within the alexis which is calcified on CT.   Scattered additional foci of hemosiderin deposition are identified in the   left frontal subcortical white matter and right occipital cortex is   nonspecific but may be related to multiple cavernoma is or hypertension.    There is a tiny focus of diffusion restriction in the right frontal   subcortical white matter and right centrum semiovale which are unchanged   since the prior exam and may represent subacute infarcts. Multiplicity   infarcts may be related to hypercoagulable state or cardioembolic events.    After contrast administration there is normal intracranial vascular   enhancement. No abnormal parenchymal or leptomeningeal enhancement.      IMPRESSION: Atrophy for the patient's age. Right frontal subcortical and   right centrum semiovale punctate infarcts are unchanged since 11/5/2022   and likely represent subacute infarcts. No abnormal enhancement. Focus of   calcification in the alexis with old hemosiderin. A few additional   scattered foci of hemosiderin deposition are unchanged.    --- End of Report ---            JUAN MANUEL CASTILLO MD; Attending Radiologist  This document has been electronically signed. Dec  9 2022  8:05PM    < end of copied text >

## 2022-12-16 NOTE — SWALLOW VFSS/MBS ASSESSMENT ADULT - DIAGNOSTIC IMPRESSIONS
1. Mild oral dysphagia for puree, regular solids, mildly thick liquids and thin liquid textures marked by adequate oral acceptance, with adequate chewing for solids, and adequate tongue motion for anterior to posterior transport. There was premature spillage to the hypopharynx due to reduced tongue to palate seal for thin liquids. Adequate oral clearance post primary swallow.  2. Mild pharyngeal dysphagia for puree, regular solids, mildly thick liquids and thin liquids textures marked by a delayed pharyngeal swallow trigger (bolus head at the valleculae for puree and regular solids) reduced base of tongue retraction, reduced hyolaryngeal elevation, reduced epiglottic deflection, reduced pharyngeal contractility and incomplete closure of the laryngeal vestibule. There was mild pharyngeal clearance deficits located primarily in the vallecuale, pyriforms and along the posterior pharyngeal wall (puree/solids>liquids). A thin liquid wash partially assists with pharyngeal clearance. No Aspiration veiwed before, during or after the swallow.

## 2022-12-16 NOTE — SWALLOW VFSS/MBS ASSESSMENT ADULT - RECOMMENDED CONSISTENCY
1) Easy to chew solids with thin liquids  2) Feeding/Swallowing Guidelines: Upright positioning, chew solids well, alternate solids and liquids  3) Aspiration/reflux precautions

## 2022-12-16 NOTE — PROGRESS NOTE ADULT - ASSESSMENT
52-year-old male with a PMHx significant for TIAs (2011, 2013), CVAs x3 (02/2022 s/p tPA, 8/3/2022 s/p tPA, 11/5/2022 with residual expressive aphasia) on Eliquis, and HLD BIBEMS after being found unresponsive at home.nephrology consulted for hypernatremia    Hypernatremia  likely sec to dehydration  improved  monitor closely  avoid overcorrection    hypocalcemia  low alb  check vit d 1,25  monitor

## 2022-12-17 LAB
ALBUMIN SERPL ELPH-MCNC: 2.6 G/DL — LOW (ref 3.3–5)
ALP SERPL-CCNC: 208 U/L — HIGH (ref 40–120)
ALT FLD-CCNC: 151 U/L — HIGH (ref 4–41)
AMYLASE P1 CFR SERPL: 45 U/L — SIGNIFICANT CHANGE UP (ref 25–125)
ANION GAP SERPL CALC-SCNC: 9 MMOL/L — SIGNIFICANT CHANGE UP (ref 7–14)
ANISOCYTOSIS BLD QL: SLIGHT — SIGNIFICANT CHANGE UP
AST SERPL-CCNC: 112 U/L — HIGH (ref 4–40)
BASOPHILS # BLD AUTO: 0 K/UL — SIGNIFICANT CHANGE UP (ref 0–0.2)
BASOPHILS NFR BLD AUTO: 0 % — SIGNIFICANT CHANGE UP (ref 0–2)
BILIRUB DIRECT SERPL-MCNC: 0.6 MG/DL — HIGH (ref 0–0.3)
BILIRUB INDIRECT FLD-MCNC: 1 MG/DL — SIGNIFICANT CHANGE UP (ref 0–1)
BILIRUB SERPL-MCNC: 1.6 MG/DL — HIGH (ref 0.2–1.2)
BUN SERPL-MCNC: 17 MG/DL — SIGNIFICANT CHANGE UP (ref 7–23)
CALCIUM SERPL-MCNC: 7.9 MG/DL — LOW (ref 8.4–10.5)
CHLORIDE SERPL-SCNC: 105 MMOL/L — SIGNIFICANT CHANGE UP (ref 98–107)
CO2 SERPL-SCNC: 23 MMOL/L — SIGNIFICANT CHANGE UP (ref 22–31)
CREAT SERPL-MCNC: 0.65 MG/DL — SIGNIFICANT CHANGE UP (ref 0.5–1.3)
CULTURE RESULTS: SIGNIFICANT CHANGE UP
CULTURE RESULTS: SIGNIFICANT CHANGE UP
EGFR: 113 ML/MIN/1.73M2 — SIGNIFICANT CHANGE UP
EOSINOPHIL # BLD AUTO: 0.4 K/UL — SIGNIFICANT CHANGE UP (ref 0–0.5)
EOSINOPHIL NFR BLD AUTO: 2.6 % — SIGNIFICANT CHANGE UP (ref 0–6)
GLUCOSE BLDC GLUCOMTR-MCNC: 125 MG/DL — HIGH (ref 70–99)
GLUCOSE BLDC GLUCOMTR-MCNC: 151 MG/DL — HIGH (ref 70–99)
GLUCOSE BLDC GLUCOMTR-MCNC: 99 MG/DL — SIGNIFICANT CHANGE UP (ref 70–99)
GLUCOSE SERPL-MCNC: 111 MG/DL — HIGH (ref 70–99)
HCT VFR BLD CALC: 28.8 % — LOW (ref 39–50)
HGB BLD-MCNC: 9.6 G/DL — LOW (ref 13–17)
IANC: 10.49 K/UL — HIGH (ref 1.8–7.4)
LIDOCAIN IGE QN: 68 U/L — HIGH (ref 7–60)
LYMPHOCYTES # BLD AUTO: 0.8 K/UL — LOW (ref 1–3.3)
LYMPHOCYTES # BLD AUTO: 5.2 % — LOW (ref 13–44)
MACROCYTES BLD QL: SLIGHT — SIGNIFICANT CHANGE UP
MAGNESIUM SERPL-MCNC: 2 MG/DL — SIGNIFICANT CHANGE UP (ref 1.6–2.6)
MANUAL SMEAR VERIFICATION: SIGNIFICANT CHANGE UP
MCHC RBC-ENTMCNC: 30.1 PG — SIGNIFICANT CHANGE UP (ref 27–34)
MCHC RBC-ENTMCNC: 33.3 GM/DL — SIGNIFICANT CHANGE UP (ref 32–36)
MCV RBC AUTO: 90.3 FL — SIGNIFICANT CHANGE UP (ref 80–100)
MONOCYTES # BLD AUTO: 1.35 K/UL — HIGH (ref 0–0.9)
MONOCYTES NFR BLD AUTO: 8.7 % — SIGNIFICANT CHANGE UP (ref 2–14)
NEUTROPHILS # BLD AUTO: 12.66 K/UL — HIGH (ref 1.8–7.4)
NEUTROPHILS NFR BLD AUTO: 81.8 % — HIGH (ref 43–77)
PHOSPHATE SERPL-MCNC: 3.1 MG/DL — SIGNIFICANT CHANGE UP (ref 2.5–4.5)
PLAT MORPH BLD: NORMAL — SIGNIFICANT CHANGE UP
PLATELET # BLD AUTO: 450 K/UL — HIGH (ref 150–400)
PLATELET COUNT - ESTIMATE: NORMAL — SIGNIFICANT CHANGE UP
POTASSIUM SERPL-MCNC: 3.8 MMOL/L — SIGNIFICANT CHANGE UP (ref 3.5–5.3)
POTASSIUM SERPL-SCNC: 3.8 MMOL/L — SIGNIFICANT CHANGE UP (ref 3.5–5.3)
PROT SERPL-MCNC: 6.3 G/DL — SIGNIFICANT CHANGE UP (ref 6–8.3)
RBC # BLD: 3.19 M/UL — LOW (ref 4.2–5.8)
RBC # FLD: 14.7 % — HIGH (ref 10.3–14.5)
RBC BLD AUTO: NORMAL — SIGNIFICANT CHANGE UP
SMUDGE CELLS # BLD: PRESENT — SIGNIFICANT CHANGE UP
SODIUM SERPL-SCNC: 137 MMOL/L — SIGNIFICANT CHANGE UP (ref 135–145)
SPECIMEN SOURCE: SIGNIFICANT CHANGE UP
SPECIMEN SOURCE: SIGNIFICANT CHANGE UP
VARIANT LYMPHS # BLD: 1.7 % — SIGNIFICANT CHANGE UP (ref 0–6)
WBC # BLD: 15.48 K/UL — HIGH (ref 3.8–10.5)
WBC # FLD AUTO: 15.48 K/UL — HIGH (ref 3.8–10.5)

## 2022-12-17 RX ADMIN — HEPARIN SODIUM 5000 UNIT(S): 5000 INJECTION INTRAVENOUS; SUBCUTANEOUS at 05:50

## 2022-12-17 RX ADMIN — CHLORHEXIDINE GLUCONATE 1 APPLICATION(S): 213 SOLUTION TOPICAL at 13:43

## 2022-12-17 RX ADMIN — Medication 81 MILLIGRAM(S): at 13:44

## 2022-12-17 RX ADMIN — LACOSAMIDE 120 MILLIGRAM(S): 50 TABLET ORAL at 05:40

## 2022-12-17 RX ADMIN — Medication 10 MILLIGRAM(S): at 13:44

## 2022-12-17 RX ADMIN — HEPARIN SODIUM 5000 UNIT(S): 5000 INJECTION INTRAVENOUS; SUBCUTANEOUS at 13:45

## 2022-12-17 RX ADMIN — LEVETIRACETAM 400 MILLIGRAM(S): 250 TABLET, FILM COATED ORAL at 05:40

## 2022-12-17 RX ADMIN — LACOSAMIDE 120 MILLIGRAM(S): 50 TABLET ORAL at 17:35

## 2022-12-17 RX ADMIN — LEVETIRACETAM 400 MILLIGRAM(S): 250 TABLET, FILM COATED ORAL at 17:38

## 2022-12-17 RX ADMIN — HEPARIN SODIUM 5000 UNIT(S): 5000 INJECTION INTRAVENOUS; SUBCUTANEOUS at 22:00

## 2022-12-17 NOTE — PROGRESS NOTE ADULT - ASSESSMENT
52-year-old male with a PMHx significant for TIAs (2011, 2013), CVAs x3 (02/2022 s/p tPA, 8/3/2022 s/p tPA, 11/5/2022 with residual expressive aphasia) on Eliquis, and HLD BIBEMS after being found unresponsive at home on the floor, last known well 12/1 at around noon. C-collar was placed. Patient was intubated in the ED for airway protection. CTH with no acute findings, vEEG with no identified seizures. Patient was weaned off pressors, extubated, and transitioned to floors 12/4/22. 12/5 am RRT called for seizure like activity with urinary incontinence, tongue biting, and R>L posturing, patient s/p ativan 2mg x3. Anesthesia called to RRT for intubation. MICU accepting patient for seizure like activity requiring intubation.      #AMS, Seizure like activity  EEG 12/7: concern for epileptiform activity; though EEG from 12/5 without such abnormalities.   - f/u neuro recs  - Patient found down and unresponsive at home on 12/2, last known well 12/1 at around noon. Intubated in ED 12/2, extubated in MICU 12/4  - RRT 12/5: seizure like acting with urinary incontinence, convulsions, and tongue biting; s/p ativan 2mg x3 with pauses in seizure activity following each; intubated for airway protection with rocuronium. Prolactin ~75   - CTH and CT cervical spine 12/2 negative for any acute pathologies. Tox screen on admission negative. 12/5: Stable exam from prior CT head, chronic microvascular ischemic changes, parenchymal loss, dystrophic calcification of central portion of alexis unchanged.  - Video EEG 12/3-12/4 without any seizure like activity, moderate to severe nonspecific diffuse or multifocal cerebral dysfunction  - c/w keppra 1500mg q12 maintenance, Keppra loaded 4.5g  - patient extubated again on 12/8th  -MRI noted, chronci CVA, no acute CVA noted         #CVAs/TIAs  - Hx of TIAs in 2011 and 2013, CVAs x3 in 02/22, 8/22, and 11/22 with residual expressive aphasia   - on Eliquis and Aspirin at home for hx of stroke  - PFO work up in past negative, no evidence of PFO on MIMI X2  - c/w ASA   - c/w heparin gtt  - Restart Statin when LFTs and CPK improve per neurology  - Being worked up for Mixed Connective Tissue Disease OP- f/u p-ANCA, c-ANCA/ [id number] negative  - Neuro recs appreciated    # Hypernatremia  speech and swallow, possible to start oral hydration   tredn Na level   renal eval appreciated  S/P hydraiton, trend Na level     #Thecal sac flattening   - CT thoracic and lumbar spine showing degenerative disc disease T6-T7 through L4-L5 and mild disc bulges at L2-L3 through L4-L5 that flatten the ventral thecal sac and narrowing of the bilateral neural foramina  - Will Monitor for now, will consider neuro/neurosurg evaluation in the future    #HLD  - atorvastatin on hold due to elevated CK and LFTs  - restart when possible for secondary stroke prevention    #?ASD/PFO  - Patient reportedly found to have a PFO in February 2022 during a stroke workup at [place]. Patient presented for a PFO closure in November 2022. Notably, no PFO was found at the time and no intervention was performed by Dr. Lynn.    - TTE 11/5/22 EF 57% and grossly normal LV function  - MIMI performed bedside 12/5 1 of 2 studies (+) on bubble study (uploaded to [facility])        #Rhabdomyolysis  - CK 8720 on admission, peaked at 15k, now downtrending  - DDx: prolonged downtime myositis v hyperthermia? (T 105.4 F) v sepsis v seizure induced        # Elevated Winston level  CHeck Abd US noted    GI eval PRN   Trend LFTs       #Partial SBO - resolved  - CT chest, abdomen, and pelvis w/ contrast concerning for possible partial SBO without transition point.  - Surgery consulted, no acute intervention at this time  - 12/5 KUB - negative for ileus or SBO  - Hold TF for 12/7 for possible trial of extubation.  - otherwise diet per nutrition      #Leukocytosis  - worsening leukocytosis  - monitor for fever  - Ortho eval fro knee swelling   - Pan Cx  - plan for LP by IR         #Concern for sepsis  Unclear source, aspiration v less likely meningitis   BCx (12/2 NGTD repeat 12/5 NGTD) and UC 12/2 NGTD  - Patient initially presented with AMS, T 105.4, tachycardic, and tachypneic   - UA negative  - s/p vanco and zosyn x1 in ED 12/2, ceftriaxone 2g BID 12/3-12/4, vanco 12/3-12/4  - 12/7 DCed Vanc since BCx from 12/5 NGTD  - c/w zosyn for aspiration pna presumed. completed course, monitor   - ID eval appreciated  - febrile again, Ortho for knee asp      # ANemia  noted, no gross bleeding  type and screen  GI eval

## 2022-12-17 NOTE — PROGRESS NOTE ADULT - SUBJECTIVE AND OBJECTIVE BOX
SURGERY DAILY PROGRESS NOTE:     SUBJECTIVE/ROS: No acute events overnight. Continues to pass gas, bowel movements. Mild abd pain. No nausea/vomiting.     OBJECTIVE:  Vital Signs Last 24 Hrs  T(C): 37.4 (17 Dec 2022 07:45), Max: 37.4 (17 Dec 2022 07:45)  T(F): 99.3 (17 Dec 2022 07:45), Max: 99.3 (17 Dec 2022 07:45)  HR: 91 (17 Dec 2022 07:45) (80 - 91)  BP: 126/74 (17 Dec 2022 07:45) (126/74 - 132/77)  BP(mean): --  RR: 18 (17 Dec 2022 07:45) (16 - 18)  SpO2: 98% (17 Dec 2022 07:45) (96% - 100%)    Parameters below as of 17 Dec 2022 07:45  Patient On (Oxygen Delivery Method): room air    PHYSICAL EXAM:  Constitutional: NAD  Respiratory: non-labored breathing, patent airway  Gastrointestinal: abdomen soft, mildly midabdominal tenderness, nondistended  Extremities: warm                          9.6    15.48 )-----------( 450      ( 17 Dec 2022 07:25 )             28.8     12-17    137  |  105  |  17  ----------------------------<  111<H>  3.8   |  23  |  0.65    Ca    7.9<L>      17 Dec 2022 07:25  Phos  3.1     12-17  Mg     2.00     12-17    TPro  6.0  /  Alb  2.5<L>  /  TBili  1.7<H>  /  DBili  x   /  AST  147<H>  /  ALT  157<H>  /  AlkPhos  200<H>  12-16

## 2022-12-17 NOTE — PROGRESS NOTE ADULT - SUBJECTIVE AND OBJECTIVE BOX
Name of Patient : TAMI DUNHAM  MRN: [id number]  Date of visit: 12-17-22       Subjective: Patient seen and examined. No new events except as noted.   Doing okay     REVIEW OF SYSTEMS:    CONSTITUTIONAL: No weakness, fevers or chills  EYES/ENT: No visual changes;  No vertigo or throat pain   NECK: No pain or stiffness  RESPIRATORY: No cough, wheezing, hemoptysis; No shortness of breath  CARDIOVASCULAR: No chest pain or palpitations  GASTROINTESTINAL: No abdominal or epigastric pain. No nausea, vomiting, or hematemesis; No diarrhea or constipation. No melena or hematochezia.  GENITOURINARY: No dysuria, frequency or hematuria  NEUROLOGICAL: No numbness or weakness  SKIN: No itching, burning, rashes, or lesions   All other review of systems is negative unless indicated above.    MEDICATIONS:  MEDICATIONS  (STANDING):  aspirin enteric coated 81 milliGRAM(s) Oral daily  bisacodyl Suppository 10 milliGRAM(s) Rectal daily  chlorhexidine 2% Cloths 1 Application(s) Topical daily  dextrose 50% Injectable 12.5 Gram(s) IV Push once  dextrose 50% Injectable 25 Gram(s) IV Push once  dextrose 50% Injectable 25 Gram(s) IV Push once  dextrose Oral Gel 15 Gram(s) Oral once  diphtheria/tetanus/pertussis (acellular) Vaccine (Adacel) 0.5 milliLiter(s) IntraMuscular once  glucagon  Injectable 1 milliGRAM(s) IntraMuscular once  heparin   Injectable 5000 Unit(s) SubCutaneous every 8 hours  lacosamide IVPB 100 milliGRAM(s) IV Intermittent every 12 hours  levETIRAcetam  IVPB 1500 milliGRAM(s) IV Intermittent every 12 hours      PHYSICAL EXAM:  T(C): 37.4 (12-17-22 @ 07:45), Max: 37.4 (12-17-22 @ 07:45)  HR: 91 (12-17-22 @ 07:45) (85 - 91)  BP: 126/74 (12-17-22 @ 07:45) (126/74 - 132/75)  RR: 18 (12-17-22 @ 07:45) (16 - 18)  SpO2: 98% (12-17-22 @ 07:45) (96% - 98%)  Wt(kg): --  I&O's Summary        Appearance: Normal	  HEENT:  PERRLA   Lymphatic: No lymphadenopathy   Cardiovascular: Normal S1 S2, no JVD  Respiratory: normal effort , clear  Gastrointestinal:  Soft, Non-tender  Skin: No rashes,  warm to touch  Psychiatry:  Mood & affect appropriate  Musculuskeletal: No edema      All labs, Imaging and EKGs personally reviewed                             9.6    15.48 )-----------( 450      ( 17 Dec 2022 07:25 )             28.8               12-17    137  |  105  |  17  ----------------------------<  111<H>  3.8   |  23  |  0.65    Ca    7.9<L>      17 Dec 2022 07:25  Phos  3.1     12-17  Mg     2.00     12-17    TPro  6.3  /  Alb  2.6<L>  /  TBili  1.6<H>  /  DBili  0.6<H>  /  AST  112<H>  /  ALT  151<H>  /  AlkPhos  208<H>  12-17

## 2022-12-17 NOTE — PROGRESS NOTE ADULT - ASSESSMENT
52-year-old male with a PMHx significant for TIAs (2011, 2013), CVAs x3 (02/2022 s/p tPA, 8/3/2022 s/p tPA, 11/5/2022 with residual expressive aphasia) on Eliquis, and HLD BIBEMS after being found unresponsive at home, w/ seizure like activity in the hospital.     RECOMMENDATIONS  - Daily CMPs to trend LFTs  - Continue to recommend MRCP to evaluate for choledocholithiasis when stable/cooperative  - No acute surgical intervention  - Care per primary    [facility]  [id number]   52-year-old male with a PMHx significant for TIAs (2011, 2013), CVAs x3 (02/2022 s/p tPA, 8/3/2022 s/p tPA, 11/5/2022 with residual expressive aphasia) on Eliquis, and HLD BIBEMS after being found unresponsive at home, w/ seizure like activity in the hospital.     RECOMMENDATIONS  - Daily CMPs to trend LFTs - adding on lipase, amylase today  - Continue to recommend MRCP to evaluate for choledocholithiasis when stable/cooperative  - No acute surgical intervention  - Care per primary    [facility]  [id number]

## 2022-12-17 NOTE — PROGRESS NOTE ADULT - ASSESSMENT
52-year-old male with a PMHx significant for TIAs (2011, 2013), CVAs x3 (02/2022 s/p tPA, 8/3/2022 s/p tPA, 11/5/2022 with residual expressive aphasia) on Eliquis, and HLD BIBEMS after being found unresponsive at home.nephrology consulted for hypernatremia    Hypernatremia  likely sec to dehydration  improved  monitor closely  avoid overcorrection    hypocalcemia  low alb  check vit d 25  monitor

## 2022-12-17 NOTE — PROGRESS NOTE ADULT - SUBJECTIVE AND OBJECTIVE BOX
Subjective: Patient seen and examined. No new events except as noted.     REVIEW OF SYSTEMS:  Unable to obtain.    MEDICATIONS:  MEDICATIONS  (STANDING):  aspirin enteric coated 81 milliGRAM(s) Oral daily  bisacodyl Suppository 10 milliGRAM(s) Rectal daily  chlorhexidine 2% Cloths 1 Application(s) Topical daily  dextrose 5%. 1000 milliLiter(s) (75 mL/Hr) IV Continuous <Continuous>  dextrose 50% Injectable 25 Gram(s) IV Push once  dextrose 50% Injectable 12.5 Gram(s) IV Push once  dextrose 50% Injectable 25 Gram(s) IV Push once  dextrose Oral Gel 15 Gram(s) Oral once  diphtheria/tetanus/pertussis (acellular) Vaccine (Adacel) 0.5 milliLiter(s) IntraMuscular once  glucagon  Injectable 1 milliGRAM(s) IntraMuscular once  heparin   Injectable 5000 Unit(s) SubCutaneous every 8 hours  lacosamide IVPB 100 milliGRAM(s) IV Intermittent every 12 hours  levETIRAcetam  IVPB 1500 milliGRAM(s) IV Intermittent every 12 hours  potassium chloride  10 mEq/100 mL IVPB 10 milliEquivalent(s) IV Intermittent every 1 hour    PHYSICAL EXAM:  Vital Signs Last 24 Hrs  T(C): 37.3 (16 Dec 2022 19:45), Max: 37.3 (16 Dec 2022 19:45)  T(F): 99.2 (16 Dec 2022 19:45), Max: 99.2 (16 Dec 2022 19:45)  HR: 85 (16 Dec 2022 19:45) (80 - 85)  BP: 132/75 (16 Dec 2022 19:45) (132/75 - 139/79)  BP(mean): --  RR: 17 (16 Dec 2022 19:45) (17 - 18)  SpO2: 96% (16 Dec 2022 19:45) (96% - 100%)    Parameters below as of 16 Dec 2022 19:45  Patient On (Oxygen Delivery Method): room air    I&O's Summary    Appearance: NAD  HEENT: dry oral mucosa, PERRL, EOMI - L temporal abrasion	  Lymphatic: No lymphadenopathy , no edema  Cardiovascular: Normal S1 S2, No JVD, No murmurs , Peripheral pulses palpable 2+ bilaterally  Respiratory: Coarse BS	  Gastrointestinal:  Soft, Non-tender, + BS	  Skin: No rashes, + ecchymoses, No cyanosis, warm to touch - BL mittes  Neurological Exam:    Mental Status: sleepy, Oriented x 1, followig some simple. minimal verbal saying few words,   Cranial Nerves: PERRL, EOMI, VFFC, sensation V1-V3 intact,  no obvious facial asymmetry    Motor: Moving uppers > lowers. uppers at least 3-4/5 in mittens   Sensation: minimal withdrawal to noxious x 4  R>L   Reflexes: 1+ throughout at biceps, brachioradialis, triceps, patellars and ankles bilaterally and equal. No clonus. R toe and L toe were both downgoing.  Coordination: unable   Gait: unable   Ext: No edema    LABS:    CARDIAC MARKERS:                        8.7    15.60 )-----------( 396      ( 16 Dec 2022 07:00 )             26.8     12-16    141  |  109<H>  |  21  ----------------------------<  109<H>  3.4<L>   |  22  |  0.66    Ca    7.9<L>      16 Dec 2022 07:00  Phos  4.1     12-15  Mg     2.30     12-15    TPro  6.0  /  Alb  2.5<L>  /  TBili  1.7<H>  /  DBili  x   /  AST  147<H>  /  ALT  157<H>  /  AlkPhos  200<H>  12-16    TELEMETRY: 	    ECG:  	  RADIOLOGY:   DIAGNOSTIC TESTING:  [ ] Echocardiogram:  [ ]  Catheterization:  [ ] Stress Test:    OTHER:

## 2022-12-18 LAB
24R-OH-CALCIDIOL SERPL-MCNC: 44.1 NG/ML — SIGNIFICANT CHANGE UP (ref 30–80)
ALBUMIN SERPL ELPH-MCNC: 2.7 G/DL — LOW (ref 3.3–5)
ALP SERPL-CCNC: 224 U/L — HIGH (ref 40–120)
ALT FLD-CCNC: 143 U/L — HIGH (ref 4–41)
ANION GAP SERPL CALC-SCNC: 9 MMOL/L — SIGNIFICANT CHANGE UP (ref 7–14)
AST SERPL-CCNC: 92 U/L — HIGH (ref 4–40)
BASOPHILS # BLD AUTO: 0.07 K/UL — SIGNIFICANT CHANGE UP (ref 0–0.2)
BASOPHILS NFR BLD AUTO: 0.4 % — SIGNIFICANT CHANGE UP (ref 0–2)
BILIRUB DIRECT SERPL-MCNC: 0.4 MG/DL — HIGH (ref 0–0.3)
BILIRUB INDIRECT FLD-MCNC: 0.9 MG/DL — SIGNIFICANT CHANGE UP (ref 0–1)
BILIRUB SERPL-MCNC: 1.3 MG/DL — HIGH (ref 0.2–1.2)
BUN SERPL-MCNC: 18 MG/DL — SIGNIFICANT CHANGE UP (ref 7–23)
CALCIUM SERPL-MCNC: 8.2 MG/DL — LOW (ref 8.4–10.5)
CHLORIDE SERPL-SCNC: 103 MMOL/L — SIGNIFICANT CHANGE UP (ref 98–107)
CO2 SERPL-SCNC: 24 MMOL/L — SIGNIFICANT CHANGE UP (ref 22–31)
CREAT SERPL-MCNC: 0.76 MG/DL — SIGNIFICANT CHANGE UP (ref 0.5–1.3)
CULTURE RESULTS: NO GROWTH — SIGNIFICANT CHANGE UP
EGFR: 108 ML/MIN/1.73M2 — SIGNIFICANT CHANGE UP
EOSINOPHIL # BLD AUTO: 0.59 K/UL — HIGH (ref 0–0.5)
EOSINOPHIL NFR BLD AUTO: 3.2 % — SIGNIFICANT CHANGE UP (ref 0–6)
GLUCOSE SERPL-MCNC: 106 MG/DL — HIGH (ref 70–99)
HCT VFR BLD CALC: 31.1 % — LOW (ref 39–50)
HGB BLD-MCNC: 10.1 G/DL — LOW (ref 13–17)
IANC: 13.26 K/UL — HIGH (ref 1.8–7.4)
IMM GRANULOCYTES NFR BLD AUTO: 9.8 % — HIGH (ref 0–0.9)
LYMPHOCYTES # BLD AUTO: 1.21 K/UL — SIGNIFICANT CHANGE UP (ref 1–3.3)
LYMPHOCYTES # BLD AUTO: 6.5 % — LOW (ref 13–44)
MAGNESIUM SERPL-MCNC: 2.1 MG/DL — SIGNIFICANT CHANGE UP (ref 1.6–2.6)
MCHC RBC-ENTMCNC: 30 PG — SIGNIFICANT CHANGE UP (ref 27–34)
MCHC RBC-ENTMCNC: 32.5 GM/DL — SIGNIFICANT CHANGE UP (ref 32–36)
MCV RBC AUTO: 92.3 FL — SIGNIFICANT CHANGE UP (ref 80–100)
MONOCYTES # BLD AUTO: 1.55 K/UL — HIGH (ref 0–0.9)
MONOCYTES NFR BLD AUTO: 8.4 % — SIGNIFICANT CHANGE UP (ref 2–14)
NEUTROPHILS # BLD AUTO: 13.26 K/UL — HIGH (ref 1.8–7.4)
NEUTROPHILS NFR BLD AUTO: 71.7 % — SIGNIFICANT CHANGE UP (ref 43–77)
NRBC # BLD: 0 /100 WBCS — SIGNIFICANT CHANGE UP (ref 0–0)
NRBC # FLD: 0 K/UL — SIGNIFICANT CHANGE UP (ref 0–0)
PHOSPHATE SERPL-MCNC: 3.3 MG/DL — SIGNIFICANT CHANGE UP (ref 2.5–4.5)
PLATELET # BLD AUTO: 508 K/UL — HIGH (ref 150–400)
POTASSIUM SERPL-MCNC: 4 MMOL/L — SIGNIFICANT CHANGE UP (ref 3.5–5.3)
POTASSIUM SERPL-SCNC: 4 MMOL/L — SIGNIFICANT CHANGE UP (ref 3.5–5.3)
PROT SERPL-MCNC: 6.4 G/DL — SIGNIFICANT CHANGE UP (ref 6–8.3)
PTH-INTACT FLD-MCNC: 27 PG/ML — SIGNIFICANT CHANGE UP (ref 15–65)
RBC # BLD: 3.37 M/UL — LOW (ref 4.2–5.8)
RBC # FLD: 15.1 % — HIGH (ref 10.3–14.5)
SODIUM SERPL-SCNC: 136 MMOL/L — SIGNIFICANT CHANGE UP (ref 135–145)
SPECIMEN SOURCE: SIGNIFICANT CHANGE UP
WBC # BLD: 18.49 K/UL — HIGH (ref 3.8–10.5)
WBC # FLD AUTO: 18.49 K/UL — HIGH (ref 3.8–10.5)

## 2022-12-18 RX ORDER — TRAMADOL HYDROCHLORIDE 50 MG/1
50 TABLET ORAL ONCE
Refills: 0 | Status: DISCONTINUED | OUTPATIENT
Start: 2022-12-18 | End: 2022-12-18

## 2022-12-18 RX ADMIN — TRAMADOL HYDROCHLORIDE 50 MILLIGRAM(S): 50 TABLET ORAL at 13:00

## 2022-12-18 RX ADMIN — HEPARIN SODIUM 5000 UNIT(S): 5000 INJECTION INTRAVENOUS; SUBCUTANEOUS at 21:03

## 2022-12-18 RX ADMIN — LEVETIRACETAM 400 MILLIGRAM(S): 250 TABLET, FILM COATED ORAL at 06:01

## 2022-12-18 RX ADMIN — LACOSAMIDE 120 MILLIGRAM(S): 50 TABLET ORAL at 06:56

## 2022-12-18 RX ADMIN — HEPARIN SODIUM 5000 UNIT(S): 5000 INJECTION INTRAVENOUS; SUBCUTANEOUS at 16:27

## 2022-12-18 RX ADMIN — CHLORHEXIDINE GLUCONATE 1 APPLICATION(S): 213 SOLUTION TOPICAL at 13:51

## 2022-12-18 RX ADMIN — TRAMADOL HYDROCHLORIDE 50 MILLIGRAM(S): 50 TABLET ORAL at 12:04

## 2022-12-18 RX ADMIN — HEPARIN SODIUM 5000 UNIT(S): 5000 INJECTION INTRAVENOUS; SUBCUTANEOUS at 06:01

## 2022-12-18 RX ADMIN — Medication 81 MILLIGRAM(S): at 12:05

## 2022-12-18 RX ADMIN — LEVETIRACETAM 400 MILLIGRAM(S): 250 TABLET, FILM COATED ORAL at 18:34

## 2022-12-18 RX ADMIN — LACOSAMIDE 120 MILLIGRAM(S): 50 TABLET ORAL at 17:51

## 2022-12-18 NOTE — PROGRESS NOTE ADULT - SUBJECTIVE AND OBJECTIVE BOX
Name of Patient : TAMI DUNHAM  MRN: [id number]  Date of visit: 12-18-22      Subjective: Patient seen and examined. No new events except as noted.   episode  of fall overnight  doing okay now      REVIEW OF SYSTEMS:    CONSTITUTIONAL: No weakness, fevers or chills  EYES/ENT: No visual changes;  No vertigo or throat pain   NECK: No pain or stiffness  RESPIRATORY: No cough, wheezing, hemoptysis; No shortness of breath  CARDIOVASCULAR: No chest pain or palpitations  GASTROINTESTINAL: No abdominal or epigastric pain. No nausea, vomiting, or hematemesis; No diarrhea or constipation. No melena or hematochezia.  GENITOURINARY: No dysuria, frequency or hematuria  NEUROLOGICAL: No numbness or weakness  SKIN: No itching, burning, rashes, or lesions   All other review of systems is negative unless indicated above.    MEDICATIONS:  MEDICATIONS  (STANDING):  aspirin enteric coated 81 milliGRAM(s) Oral daily  bisacodyl Suppository 10 milliGRAM(s) Rectal daily  chlorhexidine 2% Cloths 1 Application(s) Topical daily  dextrose 50% Injectable 25 Gram(s) IV Push once  dextrose 50% Injectable 12.5 Gram(s) IV Push once  dextrose 50% Injectable 25 Gram(s) IV Push once  dextrose Oral Gel 15 Gram(s) Oral once  diphtheria/tetanus/pertussis (acellular) Vaccine (Adacel) 0.5 milliLiter(s) IntraMuscular once  glucagon  Injectable 1 milliGRAM(s) IntraMuscular once  heparin   Injectable 5000 Unit(s) SubCutaneous every 8 hours  lacosamide IVPB 100 milliGRAM(s) IV Intermittent every 12 hours  levETIRAcetam  IVPB 1500 milliGRAM(s) IV Intermittent every 12 hours      PHYSICAL EXAM:  T(C): 37.2 (12-18-22 @ 20:41), Max: 37.3 (12-18-22 @ 05:17)  HR: 89 (12-18-22 @ 20:41) (89 - 93)  BP: 122/77 (12-18-22 @ 20:41) (110/78 - 130/77)  RR: 18 (12-18-22 @ 20:41) (17 - 18)  SpO2: 95% (12-18-22 @ 20:41) (95% - 100%)  Wt(kg): --  I&O's Summary        Appearance: Normal	  HEENT:  PERRLA   Lymphatic: No lymphadenopathy   Cardiovascular: Normal S1 S2, no JVD  Respiratory: normal effort , clear  Gastrointestinal:  Soft, Non-tender  Skin: No rashes,  warm to touch  Psychiatry:  Mood & affect appropriate  Musculuskeletal: No edema      All labs, Imaging and EKGs personally reviewed                           10.1   18.49 )-----------( 508      ( 18 Dec 2022 06:46 )             31.1               12-18    136  |  103  |  18  ----------------------------<  106<H>  4.0   |  24  |  0.76    Ca    8.2<L>      18 Dec 2022 06:46  Phos  3.3     12-18  Mg     2.10     12-18    TPro  6.4  /  Alb  2.7<L>  /  TBili  1.3<H>  /  DBili  0.4<H>  /  AST  92<H>  /  ALT  143<H>  /  AlkPhos  224<H>  12-18

## 2022-12-18 NOTE — PROGRESS NOTE ADULT - SUBJECTIVE AND OBJECTIVE BOX
Subjective: Patient seen and examined. No new events except as noted.     REVIEW OF SYSTEMS:  Unable to obtain     MEDICATIONS:  MEDICATIONS  (STANDING):  aspirin enteric coated 81 milliGRAM(s) Oral daily  bisacodyl Suppository 10 milliGRAM(s) Rectal daily  chlorhexidine 2% Cloths 1 Application(s) Topical daily  dextrose 50% Injectable 25 Gram(s) IV Push once  dextrose 50% Injectable 12.5 Gram(s) IV Push once  dextrose 50% Injectable 25 Gram(s) IV Push once  dextrose Oral Gel 15 Gram(s) Oral once  diphtheria/tetanus/pertussis (acellular) Vaccine (Adacel) 0.5 milliLiter(s) IntraMuscular once  glucagon  Injectable 1 milliGRAM(s) IntraMuscular once  heparin   Injectable 5000 Unit(s) SubCutaneous every 8 hours  lacosamide IVPB 100 milliGRAM(s) IV Intermittent every 12 hours  levETIRAcetam  IVPB 1500 milliGRAM(s) IV Intermittent every 12 hours      PHYSICAL EXAM:  T(C): 37.3 (12-18-22 @ 05:17), Max: 37.4 (12-17-22 @ 07:45)  HR: 93 (12-18-22 @ 05:17) (90 - 94)  BP: 130/77 (12-18-22 @ 05:17) (126/74 - 130/77)  RR: 17 (12-18-22 @ 05:17) (17 - 18)  SpO2: 98% (12-18-22 @ 05:17) (98% - 100%)  Wt(kg): --  I&O's Summary          Appearance: NAD  HEENT: dry oral mucosa, PERRL, EOMI - L temporal abrasion	  Lymphatic: No lymphadenopathy , no edema  Cardiovascular: Normal S1 S2, No JVD, No murmurs , Peripheral pulses palpable 2+ bilaterally  Respiratory: Coarse BS	  Gastrointestinal:  Soft, Non-tender, + BS	  Skin: No rashes, + ecchymoses, No cyanosis, warm to touch - BL mittes  Neurological Exam:    Mental Status: sleepy, Oriented x 1, followig some simple. minimal verbal saying few words,   Cranial Nerves: PERRL, EOMI, VFFC, sensation V1-V3 intact,  no obvious facial asymmetry    Motor: Moving uppers > lowers. uppers at least 3-4/5 in mittens   Sensation: minimal withdrawal to noxious x 4  R>L   Reflexes: 1+ throughout at biceps, brachioradialis, triceps, patellars and ankles bilaterally and equal. No clonus. R toe and L toe were both downgoing.  Coordination: unable   Gait: unable   Ext: No edema        LABS:    CARDIAC MARKERS:                                9.6    15.48 )-----------( 450      ( 17 Dec 2022 07:25 )             28.8     12-17    137  |  105  |  17  ----------------------------<  111<H>  3.8   |  23  |  0.65    Ca    7.9<L>      17 Dec 2022 07:25  Phos  3.1     12-17  Mg     2.00     12-17    TPro  6.3  /  Alb  2.6<L>  /  TBili  1.6<H>  /  DBili  0.6<H>  /  AST  112<H>  /  ALT  151<H>  /  AlkPhos  208<H>  12-17    proBNP:   Lipid Profile:   HgA1c:   TSH:             TELEMETRY: 	    ECG:  	  RADIOLOGY:   DIAGNOSTIC TESTING:  [ ] Echocardiogram:  [ ]  Catheterization:  [ ] Stress Test:    OTHER:

## 2022-12-18 NOTE — PROGRESS NOTE ADULT - SUBJECTIVE AND OBJECTIVE BOX
[facility] [place] [facility]   MD SIVA CLAUDIO MD RUORU WONG, PA KRISTINE SOLTANPOUR, DO    TEL:  OFFICE: [phone]      RENAL FOLLOW UP NOTE-- Date of Service ;; 12-18-22 @ 10:57  --------------------------------------------------------------------------------  HPI:  Pt seen and examined at bedside  Denies SOB, chest pain.         PAST HISTORY  --------------------------------------------------------------------------------  No significant changes to PMH, PSH, FHx, SHx, unless otherwise noted    ALLERGIES & MEDICATIONS  --------------------------------------------------------------------------------  Allergies    No Known Allergies    Intolerances      Standing Inpatient Medications  aspirin enteric coated 81 milliGRAM(s) Oral daily  bisacodyl Suppository 10 milliGRAM(s) Rectal daily  chlorhexidine 2% Cloths 1 Application(s) Topical daily  dextrose 50% Injectable 25 Gram(s) IV Push once  dextrose 50% Injectable 12.5 Gram(s) IV Push once  dextrose 50% Injectable 25 Gram(s) IV Push once  dextrose Oral Gel 15 Gram(s) Oral once  diphtheria/tetanus/pertussis (acellular) Vaccine (Adacel) 0.5 milliLiter(s) IntraMuscular once  glucagon  Injectable 1 milliGRAM(s) IntraMuscular once  heparin   Injectable 5000 Unit(s) SubCutaneous every 8 hours  lacosamide IVPB 100 milliGRAM(s) IV Intermittent every 12 hours  levETIRAcetam  IVPB 1500 milliGRAM(s) IV Intermittent every 12 hours    PRN Inpatient Medications      REVIEW OF SYSTEMS  --------------------------------------------------------------------------------  General: no fever  CVS: no chest pain  RESP: no sob, no cough  ABD: no abdominal pain  : no dysuria,  MSK: no edema     VITALS/PHYSICAL EXAM  --------------------------------------------------------------------------------  T(C): 36.7 (12-18-22 @ 07:40), Max: 37.3 (12-18-22 @ 05:17)  HR: 92 (12-18-22 @ 07:40) (90 - 94)  BP: 110/78 (12-18-22 @ 07:40) (110/78 - 130/77)  RR: 17 (12-18-22 @ 07:40) (17 - 18)  SpO2: 98% (12-18-22 @ 07:40) (98% - 100%)  Wt(kg): --        Physical Exam:  	Gen: NAD  	HEENT: MMM  	Pulm: CTA B/L  	CV: S1S2  	Abd: Soft, +BS  	Ext: No LE edema B/L                      Neuro: Awake , alert  	Skin: Warm and Dry   	Vascular access: None            voiding  LABS/STUDIES  --------------------------------------------------------------------------------              10.1   18.49 >-----------<  508      [12-18-22 @ 06:46]              31.1     136  |  103  |  18  ----------------------------<  106      [12-18-22 @ 06:46]  4.0   |  24  |  0.76        Ca     8.2     [12-18-22 @ 06:46]      Mg     2.10     [12-18-22 @ 06:46]      Phos  3.3     [12-18-22 @ 06:46]    TPro  6.4  /  Alb  2.7  /  TBili  1.3  /  DBili  0.4  /  AST  92  /  ALT  143  /  AlkPhos  224  [12-18-22 @ 06:46]          Creatinine Trend:  SCr 0.76 [12-18 @ 06:46]  SCr 0.65 [12-17 @ 07:25]  SCr 0.66 [12-16 @ 07:00]  SCr 0.69 [12-15 @ 20:52]  SCr 0.73 [12-15 @ 06:35]    Urinalysis - [12-05-22 @ 21:00]      Color Yellow / Appearance Clear / SG 1.048 / pH 7.5      Gluc Negative / Ketone Moderate  / Bili Negative / Urobili <2 mg/dL       Blood Negative / Protein 100 mg/dL / Leuk Est Negative / Nitrite Negative      RBC 0 / WBC 1 / Hyaline  / Gran  / Sq Epi  / Non Sq Epi  / Bacteria     Urine Sodium 118      [12-15-22 @ 19:53]  Urine Osmolality 882      [12-15-22 @ 19:53]    Iron 49, TIBC 142, %sat 35      [12-14-22 @ 04:30]  Ferritin 692      [12-14-22 @ 04:30]  PTH -- (Ca --)      [12-18-22 @ 06:46]   27  Vitamin D (25OH) 44.1      [12-18-22 @ 06:46]  TSH 1.30      [12-05-22 @ 02:30]  Lipid: chol 131, , HDL 34, LDL --      [11-05-22 @ 06:57]

## 2022-12-18 NOTE — CHART NOTE - NSCHARTNOTEFT_GEN_A_CORE
52-year-old male with a PMHx significant for TIAs (2011, 2013), CVAs x3 (02/2022 s/p tPA, 8/3/2022 s/p tPA, 11/5/2022 with residual expressive aphasia) on Eliquis, and HLD a/w AMS likely secondary to seizure, rhabdomyolysis, partial SBO, sepsis, and L knee effusion/swelling. Called by RN to evaluate patient after patient was found on the floor in his room. As per RNWinter, patient's bed alarm rang. Patient was found A&O x 4, on the floor in room, in front of bathroom door. Patient reports that he had to urinate and his urinal was not around. He got out of bed and bear weight on his left knee, it buckled and he fell, landing on his buttocks. Patient states that he then crawled to the bathroom door. Patient needed 2 person full assist to ambulate back to bed. Patient reports aggravation of left knee pain, generalized body aches, and HA. Denies any head strike, dizziness, weakness, speech/visual changes, chest pain, palpitations, SOB, aura, amnesia, LOC, tremors, numbness/tingling, or bowel/bladder incontinence.     Vital Signs Last 24 Hrs  T(C): 36.7 (18 Dec 2022 03:00), Max: 37.4 (17 Dec 2022 07:45)  T(F): 98 (18 Dec 2022 03:00), Max: 99.3 (17 Dec 2022 07:45)  HR: 90 (18 Dec 2022 03:00) (85 - 94)  BP: 128/80 (18 Dec 2022 03:00) (126/74 - 131/81)  RR: 18 (18 Dec 2022 03:00) (16 - 18)  SpO2: 100% (18 Dec 2022 03:00) (98% - 100%)    Gen: Pt lying in bed in no apparent distress.  HEENT: NC/AT. EOMI & PERRLA, bilaterally. FROM. Supple. Trachea midline. No JVD.  Chest: Good air entry. CTA, bilat. No w/r/r. No rib cage tenderness to palpation.  Cardiac: RRR. Clear s1 and s2. No g/m/r.  Abd: ND. Normoactive BS. NT x 4.  Back: No C-, T-, L-, S- spine deformities or paraspinal muscle tenderness.  Ext: No c/c/e of UEs or LEs, bilat.  MS: FROM of RUE, LUE, and RLE. Limited ROM of LLE secondary to left knee pain and swelling. Ace bandage on left knee. Strength 4-5/5 of RUE, LUE, and RLE. LLE strength not testing secondary to pain.  Neuro: A&O x 4. Slow, halting speech (pt's baseline). No focal deficits.  Skin: Small eschar noted on anterior aspect of left knee. No other bruises or skin lesions noted.      Imp: Mechanical Fall, Exacerbation of Left knee pain  Plan: Reinforced with patient importance of using call bell and waiting for assistance  Tramadol 50mg PO x 1 dose for pain scale 10/10  X-ray of Left knee  Continue fall precautions  Patient now in enhanced care room with a sitter

## 2022-12-18 NOTE — PROGRESS NOTE ADULT - SUBJECTIVE AND OBJECTIVE BOX
Neurology Progress Note    S: Patient seen and examined , doing okay. c/o knee pain     Medication:  MEDICATIONS  (STANDING):  aspirin enteric coated 81 milliGRAM(s) Oral daily  bisacodyl Suppository 10 milliGRAM(s) Rectal daily  chlorhexidine 2% Cloths 1 Application(s) Topical daily  dextrose 50% Injectable 25 Gram(s) IV Push once  dextrose 50% Injectable 12.5 Gram(s) IV Push once  dextrose 50% Injectable 25 Gram(s) IV Push once  dextrose Oral Gel 15 Gram(s) Oral once  diphtheria/tetanus/pertussis (acellular) Vaccine (Adacel) 0.5 milliLiter(s) IntraMuscular once  glucagon  Injectable 1 milliGRAM(s) IntraMuscular once  heparin   Injectable 5000 Unit(s) SubCutaneous every 8 hours  lacosamide IVPB 100 milliGRAM(s) IV Intermittent every 12 hours  levETIRAcetam  IVPB 1500 milliGRAM(s) IV Intermittent every 12 hours    MEDICATIONS  (PRN):        Vitals:    Vital Signs Last 24 Hrs  T(C): 37.3 (12-18-22 @ 05:17), Max: 37.3 (12-18-22 @ 05:17)  T(F): 99.1 (12-18-22 @ 05:17), Max: 99.1 (12-18-22 @ 05:17)  HR: 93 (12-18-22 @ 05:17) (90 - 94)  BP: 130/77 (12-18-22 @ 05:17) (127/89 - 130/77)  BP(mean): --  RR: 17 (12-18-22 @ 05:17) (17 - 18)  SpO2: 98% (12-18-22 @ 05:17) (98% - 100%)                    General Exam:   General Appearance: Appropriately dressed and in no acute distress       Head: Normocephalic, atraumatic and no dysmorphic features  Ear, Nose, and Throat: Moist mucous membranes    CVS: S1S2+  Resp: No SOB, no wheeze or rhonchi  Abd: soft NTND  Extremities: No edema, no cyanosis  Skin: No bruises, no rashes     Neurological Exam:    Mental Status: Awake, Oriented x 2, followign some simple.  speaking more but slurred (+ tongue bite .    Cranial Nerves: PERRL, EOMI, VFFC, sensation V1-V3 intact,  no obvious facial asymmetry    Motor: Moving uppers > lowers. uppers at least 3-4/5 in mittens ; not moving LLE very well   Sensation:minimal withdrawal to noxious x 4  R>L   Reflexes: 1+ throughout at biceps, brachioradialis, triceps, patellars and ankles bilaterally and equal. No clonus. R toe and L toe were both downgoing.  Coordination: unable   Gait: unable     I personally reviewed the below data/images/labs:  CBC Full  -  ( 18 Dec 2022 06:46 )  WBC Count : 18.49 K/uL  RBC Count : 3.37 M/uL  Hemoglobin : 10.1 g/dL  Hematocrit : 31.1 %  Platelet Count - Automated : 508 K/uL  Mean Cell Volume : 92.3 fL  Mean Cell Hemoglobin : 30.0 pg  Mean Cell Hemoglobin Concentration : 32.5 gm/dL  Auto Neutrophil # : 13.26 K/uL  Auto Lymphocyte # : 1.21 K/uL  Auto Monocyte # : 1.55 K/uL  Auto Eosinophil # : 0.59 K/uL  Auto Basophil # : 0.07 K/uL  Auto Neutrophil % : 71.7 %  Auto Lymphocyte % : 6.5 %  Auto Monocyte % : 8.4 %  Auto Eosinophil % : 3.2 %  Auto Basophil % : 0.4 %    12-18    136  |  103  |  18  ----------------------------<  106<H>  4.0   |  24  |  0.76    Ca    8.2<L>      18 Dec 2022 06:46  Phos  3.3     12-18  Mg     2.10     12-18    TPro  6.4  /  Alb  2.7<L>  /  TBili  1.3<H>  /  DBili  0.4<H>  /  AST  92<H>  /  ALT  143<H>  /  AlkPhos  224<H>  12-18    < from: CT Head No Cont (12.02.22 @ 20:27) >    ACC: [id number] EXAM:  CT CERVICAL SPINE                        ACC: [id number] EXAM:  CT MAXILLOFACIAL                        ACC: [id number] EXAM:  CT BRAIN                          PROCEDURE DATE:  12/02/2022        INTERPRETATION:  CLINICAL INDICATION: Trauma.    Technique: Noncontrast axial CT of the head, facial bones, and cervical   spine was performed. 3-D reformats of the facial bones was obtained.   Coronal and sagittal reformats were obtained.      COMPARISON: None.    FINDINGS:  Head CT:  The ventricles and sulci are within normal limits. 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 There is no intraparenchymal hematoma, mass effect   or midline shift. No abnormal extra-axial fluid collections or   hemorrhages are present.    There is swelling of the left lateral scalp. The calvarium is intact.   There are scattered mucosal inflammatory changes in the paranasal sinuses.      Cervical spine CT:  Alignment ismaintained. Vertebral bodies are normal in height, without   evidence of fracture or dislocation. Prevertebral soft tissues are within   normal limits without soft tissue swelling or hematoma.    Intervertebral discs are intact. Neural foramina and spinal canal are   intact.    The visualized lung apices are within normal limits.      Facial bone CT:    No acute facial fracture.    There are scattered mucosal inflammatory changes in the paranasal   sinuses. Mastoid air cells are clear.    Soft tissues appear unremarkable.        IMPRESSION:  CT HEAD: No acute abnormality.  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  CT CERVICAL SPINE: No acute abnormality  CT FACIAL BONE: No acute abnormality    --- End of Report ---       DELGADO IVAN MD; Attending Radiologist  This document has been electronically signed. Dec  2 2022  9:02PM    < end of copied text >  < from: CT Lumbar Spine No Cont (12.02.22 @ 20:27) >    ACC: [id number] EXAM:  CT LUMBAR SPINE                        ACC: [id number] EXAM:  CT THORACIC SPINE                          PROCEDURE DATE:  12/02/2022          INTERPRETATION:  CT thoracic and lumbar spine without IV contrast    CLINICAL INFORMATION:  Trauma  Back pain, fracture.    TECHNIQUE:  Contiguous axial 2 mm sections were obtained through the   thoracic and lumbar spine using a single helical acquisition.     Additional 2 mm sagittal and coronal reconstructions of the spine were   obtained. These additional reformatted images were used to evaluate the   spine for alignment, vertebral fractures and the integrity of the the   posterior elements.   This scan was performed using automatic exposure   control (radiation dose reduction software) to obtain a diagnostic image   quality scan with patient dose as low as reasonably achievable.    FINDINGS:   No prior similar studies are available for review    Thoracic and lumbar vertebral body heights are maintained. No vertebral   fracture is seen. No destructive bone lesion is found.  Alignment is   preserved.  Facet joints appear intact and aligned.    Thoracic and lumbar intervertebral disc spaces appear intact.   Degenerative disc disease and spondylosis is noted at T6-T7 throughL4-5   with loss of disc height and associated degenerative endplate changes.   Mild disc bulges at L2-3 through L4-5 flatten the ventral thecal sac and   narrow the BILATERAL neural foramina.    No paraspinal mass is recognized.  Paraspinal soft tissues appear intact.      IMPRESSION:  Degenerative disc disease and spondylosis is noted at T6-T7   through L4-5 with loss of disc height and associated degenerative   endplate changes. Mild disc bulges at L2-3 through L4-5 flatten the   ventral thecal sac and narrow the BILATERAL neural foramina   No   vertebral fracture is recognized.    --- End of Report ---       ANGIE ESCOBAR MD; Attending Radiologist  This document has been electronically signed. Dec  2 2022  8:55PM    < end of copied text >    EEG Classification / Summary:  Abnormal EEG in a comatose patient due to diffuse suppression gradually improving to discontinuous background, with right hemispheric relative attenuation/suppression. No epileptiform abnormalities or seizures are captured.    Clinical Impression:  Severe diffuse cerebral dysfunction that gradually improves is nonspecific in etiology. In this case, it may be related to sedating medication (propofol) with improvement after decreasing and stopping the medication.  Right hemispheric focal cerebral dysfunction can be structural or functional in etiology.      12/7  Clinical Impression:  -Occasional runs of right frontal lateralized periodic discharges (LPDs) at up to 1.3 Hz indicate a potentially epileptogenic focus in the right frontal region and are on the ictal-interictal continuum.  -A pattern on the ictal-interictal continuum is a pattern that does not qualify as an electrographic seizure or electrographic status epilepticus, but there is a reasonable chance that it may be contributing to impaired alertness, causing other clinical symptoms, and/or contributing to neuronal injury.  -Right hemispheric relative attenuation indicates right hemispheric focal cerebral dysfunction, which can be structural or functional in etiology.  -Rare left frontal epileptiform discharges indicate a potentially epileptogenic focus in this reigon  -Severe diffuse slowing and GRDA indicate severe diffuse cerebral dysfunction of nonspecific etiology.  -No electrographic seizures are captured.     < from: MR Head w/wo IV Cont (12.09.22 @ 19:54) >    ACC: [id number] EXAM:  MR BRAIN WAW IC                          PROCEDURE DATE:  12/09/2022          INTERPRETATION:  CLINICAL INDICATION: CVA, found unresponsive at home      Magnetic resonance imaging of the brain was carried out with transaxial   SPGR, FLAIR, fast spin echo T2 weighted images, axial susceptibility   weighted series, diffusion weighted series and sagittal T1 weighted   series on a 1.5 Maritza magnet. Post contrast axial, coronal and sagittal   T1 weighted images were obtained. 10cc of Gadavist were intravenously   injected, 0 cc were discarded.      Comparison is made with the prior brain CT of 12/5/2022 and MRI 11/5/2022.    Mild ventricular and sulcal prominence is consistent with moderate   atrophy for the patient's age. No acute hemorrhage is identified. There   is a focus of hemosiderin within the alexis which is calcified on CT.   Scattered additional foci of hemosiderin deposition are identified in the   left frontal subcortical white matter and right occipital cortex is   nonspecific but may be related to multiple cavernoma is or hypertension.    There is a tiny focus of diffusion restriction in the right frontal   subcortical white matter and right centrum semiovale which are unchanged   since the prior exam and may represent subacute infarcts. Multiplicity   infarcts may be related to hypercoagulable state or cardioembolic events.    After contrast administration there is normal intracranial vascular   enhancement. No abnormal parenchymal or leptomeningeal enhancement.      IMPRESSION: Atrophy for the patient's age. Right frontal subcortical and   right centrum semiovale punctate infarcts are unchanged since 11/5/2022   and likely represent subacute infarcts. No abnormal enhancement. Focus of   calcification in the alexis with old hemosiderin. A few additional   scattered foci of hemosiderin deposition are unchanged.    --- End of Report ---            JUAN MANUEL CASTILLO MD; Attending Radiologist  This document has been electronically signed. Dec  9 2022  8:05PM    < end of copied text >

## 2022-12-18 NOTE — PROGRESS NOTE ADULT - ASSESSMENT
52-year-old male with a PMHx significant for TIAs (2011, 2013), CVAs x3 (02/2022 s/p tPA, 8/3/2022 s/p tPA, 11/5/2022 with residual expressive aphasia) on Eliquis, and HLD BIBEMS after being found unresponsive at home on the floor, last known well 12/1 at around noon. C-collar was placed. Patient was intubated in the ED for airway protection. CTH with no acute findings, vEEG with no identified seizures. Patient was weaned off pressors, extubated, and transitioned to floors 12/4/22. 12/5 am RRT called for seizure like activity with urinary incontinence, tongue biting, and R>L posturing, patient s/p ativan 2mg x3. Anesthesia called to RRT for intubation. MICU accepting patient for seizure like activity requiring intubation.      #AMS, Seizure like activity  EEG 12/7: concern for epileptiform activity; though EEG from 12/5 without such abnormalities.   - f/u neuro recs  - Patient found down and unresponsive at home on 12/2, last known well 12/1 at around noon. Intubated in ED 12/2, extubated in MICU 12/4  - RRT 12/5: seizure like acting with urinary incontinence, convulsions, and tongue biting; s/p ativan 2mg x3 with pauses in seizure activity following each; intubated for airway protection with rocuronium. Prolactin ~75   - CTH and CT cervical spine 12/2 negative for any acute pathologies. Tox screen on admission negative. 12/5: Stable exam from prior CT head, chronic microvascular ischemic changes, parenchymal loss, dystrophic calcification of central portion of alexis unchanged.  - Video EEG 12/3-12/4 without any seizure like activity, moderate to severe nonspecific diffuse or multifocal cerebral dysfunction  - c/w keppra 1500mg q12 maintenance, Keppra loaded 4.5g  - patient extubated again on 12/8th  -MRI noted, chronci CVA, no acute CVA noted         #CVAs/TIAs  - Hx of TIAs in 2011 and 2013, CVAs x3 in 02/22, 8/22, and 11/22 with residual expressive aphasia   - on Eliquis and Aspirin at home for hx of stroke  - PFO work up in past negative, no evidence of PFO on MIMI X2  - c/w ASA   - c/w heparin gtt  - Restart Statin when LFTs and CPK improve per neurology  - Being worked up for Mixed Connective Tissue Disease OP- f/u p-ANCA, c-ANCA/ [id number] negative  - Neuro recs appreciated    # Hypernatremia  speech and swallow, possible to start oral hydration   tredn Na level   renal eval appreciated  S/P hydraiton, trend Na level     #Thecal sac flattening   - CT thoracic and lumbar spine showing degenerative disc disease T6-T7 through L4-L5 and mild disc bulges at L2-L3 through L4-L5 that flatten the ventral thecal sac and narrowing of the bilateral neural foramina  - Will Monitor for now, will consider neuro/neurosurg evaluation in the future    #HLD  - atorvastatin on hold due to elevated CK and LFTs  - restart when possible for secondary stroke prevention    #?ASD/PFO  - Patient reportedly found to have a PFO in February 2022 during a stroke workup at [facility]. Patient presented for a PFO closure in November 2022. Notably, no PFO was found at the time and no intervention was performed by Dr. Lynn.    - TTE 11/5/22 EF 57% and grossly normal LV function  - MIMI performed bedside 12/5 1 of 2 studies (+) on bubble study (uploaded to [facility])        #Rhabdomyolysis  - CK 8720 on admission, peaked at 15k, now downtrending  - DDx: prolonged downtime myositis v hyperthermia? (T 105.4 F) v sepsis v seizure induced        # Elevated Winston level  CHeck Abd US noted    GI eval PRN   Trend LFTs       #Partial SBO - resolved  - CT chest, abdomen, and pelvis w/ contrast concerning for possible partial SBO without transition point.  - Surgery consulted, no acute intervention at this time  - 12/5 KUB - negative for ileus or SBO  - Hold TF for 12/7 for possible trial of extubation.  - otherwise diet per nutrition      #Leukocytosis  - worsening leukocytosis  - monitor for fever  - Ortho eval fro knee swelling   - Pan Cx  - plan for LP by IR         #Concern for sepsis  Unclear source, aspiration v less likely meningitis   BCx (12/2 NGTD repeat 12/5 NGTD) and UC 12/2 NGTD  - Patient initially presented with AMS, T 105.4, tachycardic, and tachypneic   - UA negative  - s/p vanco and zosyn x1 in ED 12/2, ceftriaxone 2g BID 12/3-12/4, vanco 12/3-12/4  - 12/7 DCed Vanc since BCx from 12/5 NGTD  - c/w zosyn for aspiration pna presumed. completed course, monitor   - ID eval appreciated  - febrile again, Ortho for knee asp      # ANemia  noted, no gross bleeding  type and screen  GI eval

## 2022-12-18 NOTE — PROGRESS NOTE ADULT - ASSESSMENT
52-year-old male with a PMHx significant for TIAs (2011, 2013), CVAs x3 (02/2022 s/p tPA, 8/3/2022 s/p tPA, 11/5/2022 with residual expressive aphasia) on Eliquis, and HLD BIBEMS after being found unresponsive at home.nephrology consulted for hypernatremia    Hypernatremia  likely sec to dehydration  improved  monitor closely  avoid overcorrection    hypocalcemia  low alb  Improving  Vit d 25 optimal  monitor

## 2022-12-18 NOTE — PROVIDER CONTACT NOTE (FALL NOTIFICATION) - BACKGROUND
52-year-old male with a PMHx significant for TIAs (2011, 2013), CVAs x3 (02/2022 s/p tPA, 8/3/2022 s/p tPA, 11/5/2022 with residual expressive aphasia) admitted for alter mental status

## 2022-12-18 NOTE — PROGRESS NOTE ADULT - ASSESSMENT
52-year-old  M known to me from outpatient setting with  TIAs (2011, 2013), Strokes x3 (02/2022 s/p tPA, 8/3/2022 s/p tPA, 11/5/2022 with residual expressive aphasia) on Eliquis, was on testosterone outpatient stopped after last stroke, HLD BIBEMS after being found unresponsive  Temp 105.4 on arrival tachy and /110. 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   CT cervical spine and maxillofacial scans negative.  CT chest, abdomen, and pelvis w/ contrast concerning for possible partial SBO without transition point. Surgery consulted in ED, no acute surgical intervention at this time.   CT thoracic and lumbar spine showing degenerative disc disease T6-T7 through L4-L5 and mild disc bulges at L2-L3 through L4-L5 that flatten the ventral thecal sac and narrowing of the bilateral neural foramina.  MRI 11/2022: R centrum semiovale and R posterior frontal infarcts   takes adderall at home   + rhabdo  EEG no seizures   + transaminitis   CTH 12/5 stable   outpatient hypercoag workup neg   12/5 extubated then reintibuated for seizure activity and tx back to ICU   EEG this AM 12/5 with only slowing   EEG 12/6 slowing but no seizures   spoke with Pippa Conti (friend) who states after he was taken off the testosterone he ordered a mail order testosterone supplement, unclear if he started it yet, unclear what this was  12/7 EEG no electrogtraphic seizures captured but R LPDs, rare L frontal discharges, severe GRDA.   12/8 EEG improved.  extubated. following some commands   12/9 moving uppers> lowers   12/11 AAOx2 uppers 4-5/5; not moving LLE well   MRI brain neg for new stroke   s/p L knee tap arthrocentesis  by ortho on 12/12   spoke with friend pippa conti - she counted his adderrall and didn't take extra tables. did find ashwagandha and red wood (bottle was sealed) supplement in his apartment   s/p LP 12/5 --> CSF glucose and protein WNL.  will f/u remaining studies. non infectious appearing   o/e AAOx2, slurred but 2/2 tongue bite   + transaminitis     Impression:   1) AMS of unclear etiology, possible now seizure, related to adderral withdrawal? 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 had MIMI was question of PFO but not found. s/p ILR   -SS   - resume AC s/p LP   - possible MRCP planning for Monday. GI f/u.  elevated LFTs   - ortho for L knee s/p tap / arthrocentesis   -  Vimpat 100mg BID    - keppra 1500mg BID.   - fever on arrival, treatred initially for meningitis.  was on abx for aspiration PNA.  >LP done--> non  infectious   - monitor LFTs , downtrending   - IVF  - CPK elevated. trend   - would consider endocrine   - there was some concern outpatient he may have a mixed  connective tissue disorder   - statin therapy for secondary stroke prevention when LFTS and CPK improve   - telemetry  - PT/OT/SS/SLP, OOBC  - check FS, glucose control <180  - GI/DVT ppx  - Thank you for allowing me to participate in the care of this patient. Call with questions.   - spoke with Pippa Conti at [phone] for more collateral info and to provide update. spoke again 12/12 over phone.   - spoke with friend at bedside, [place] 12/11   Braxton Forde MD  Vascular Neurology  Office: [phone]

## 2022-12-19 LAB
ALBUMIN SERPL ELPH-MCNC: 2.4 G/DL — LOW (ref 3.3–5)
ALP SERPL-CCNC: 201 U/L — HIGH (ref 40–120)
ALT FLD-CCNC: 115 U/L — HIGH (ref 4–41)
ANION GAP SERPL CALC-SCNC: 9 MMOL/L — SIGNIFICANT CHANGE UP (ref 7–14)
AST SERPL-CCNC: 62 U/L — HIGH (ref 4–40)
BASOPHILS # BLD AUTO: 0.21 K/UL — HIGH (ref 0–0.2)
BASOPHILS NFR BLD AUTO: 1.4 % — SIGNIFICANT CHANGE UP (ref 0–2)
BILIRUB DIRECT SERPL-MCNC: 0.3 MG/DL — SIGNIFICANT CHANGE UP (ref 0–0.3)
BILIRUB INDIRECT FLD-MCNC: 0.8 MG/DL — SIGNIFICANT CHANGE UP (ref 0–1)
BILIRUB SERPL-MCNC: 1.1 MG/DL — SIGNIFICANT CHANGE UP (ref 0.2–1.2)
BUN SERPL-MCNC: 18 MG/DL — SIGNIFICANT CHANGE UP (ref 7–23)
CALCIUM SERPL-MCNC: 8.3 MG/DL — LOW (ref 8.4–10.5)
CHLORIDE SERPL-SCNC: 103 MMOL/L — SIGNIFICANT CHANGE UP (ref 98–107)
CO2 SERPL-SCNC: 22 MMOL/L — SIGNIFICANT CHANGE UP (ref 22–31)
CREAT SERPL-MCNC: 0.73 MG/DL — SIGNIFICANT CHANGE UP (ref 0.5–1.3)
EGFR: 109 ML/MIN/1.73M2 — SIGNIFICANT CHANGE UP
EOSINOPHIL # BLD AUTO: 0.71 K/UL — HIGH (ref 0–0.5)
EOSINOPHIL NFR BLD AUTO: 4.7 % — SIGNIFICANT CHANGE UP (ref 0–6)
GLUCOSE SERPL-MCNC: 108 MG/DL — HIGH (ref 70–99)
HCT VFR BLD CALC: 32.5 % — LOW (ref 39–50)
HGB BLD-MCNC: 10.6 G/DL — LOW (ref 13–17)
IANC: 9.8 K/UL — HIGH (ref 1.8–7.4)
IMM GRANULOCYTES NFR BLD AUTO: 12 % — HIGH (ref 0–0.9)
INNER EAR 68KD AB FLD QL: <1.5 U/L — SIGNIFICANT CHANGE UP (ref 0–3.1)
LYMPHOCYTES # BLD AUTO: 1.5 K/UL — SIGNIFICANT CHANGE UP (ref 1–3.3)
LYMPHOCYTES # BLD AUTO: 9.8 % — LOW (ref 13–44)
MAGNESIUM SERPL-MCNC: 2.3 MG/DL — SIGNIFICANT CHANGE UP (ref 1.6–2.6)
MCHC RBC-ENTMCNC: 30.3 PG — SIGNIFICANT CHANGE UP (ref 27–34)
MCHC RBC-ENTMCNC: 32.6 GM/DL — SIGNIFICANT CHANGE UP (ref 32–36)
MCV RBC AUTO: 92.9 FL — SIGNIFICANT CHANGE UP (ref 80–100)
MONOCYTES # BLD AUTO: 1.21 K/UL — HIGH (ref 0–0.9)
MONOCYTES NFR BLD AUTO: 7.9 % — SIGNIFICANT CHANGE UP (ref 2–14)
NEUTROPHILS # BLD AUTO: 9.8 K/UL — HIGH (ref 1.8–7.4)
NEUTROPHILS NFR BLD AUTO: 64.2 % — SIGNIFICANT CHANGE UP (ref 43–77)
NRBC # BLD: 0 /100 WBCS — SIGNIFICANT CHANGE UP (ref 0–0)
NRBC # FLD: 0 K/UL — SIGNIFICANT CHANGE UP (ref 0–0)
OSMOLALITY UR: 389 MOSM/KG — SIGNIFICANT CHANGE UP (ref 50–1200)
PHOSPHATE SERPL-MCNC: 3.7 MG/DL — SIGNIFICANT CHANGE UP (ref 2.5–4.5)
PLATELET # BLD AUTO: 479 K/UL — HIGH (ref 150–400)
POTASSIUM SERPL-MCNC: 4.2 MMOL/L — SIGNIFICANT CHANGE UP (ref 3.5–5.3)
POTASSIUM SERPL-SCNC: 4.2 MMOL/L — SIGNIFICANT CHANGE UP (ref 3.5–5.3)
PROT SERPL-MCNC: 6.5 G/DL — SIGNIFICANT CHANGE UP (ref 6–8.3)
RBC # BLD: 3.5 M/UL — LOW (ref 4.2–5.8)
RBC # FLD: 14.8 % — HIGH (ref 10.3–14.5)
SODIUM SERPL-SCNC: 134 MMOL/L — LOW (ref 135–145)
SODIUM UR-SCNC: 60 MMOL/L — SIGNIFICANT CHANGE UP
WBC # BLD: 15.26 K/UL — HIGH (ref 3.8–10.5)
WBC # FLD AUTO: 15.26 K/UL — HIGH (ref 3.8–10.5)

## 2022-12-19 PROCEDURE — 73562 X-RAY EXAM OF KNEE 3: CPT | Mod: 26,LT

## 2022-12-19 PROCEDURE — 99232 SBSQ HOSP IP/OBS MODERATE 35: CPT

## 2022-12-19 RX ORDER — ACETAMINOPHEN 500 MG
650 TABLET ORAL ONCE
Refills: 0 | Status: COMPLETED | OUTPATIENT
Start: 2022-12-19 | End: 2022-12-19

## 2022-12-19 RX ORDER — IBUPROFEN 200 MG
400 TABLET ORAL EVERY 6 HOURS
Refills: 0 | Status: DISCONTINUED | OUTPATIENT
Start: 2022-12-19 | End: 2022-12-28

## 2022-12-19 RX ADMIN — LEVETIRACETAM 400 MILLIGRAM(S): 250 TABLET, FILM COATED ORAL at 05:47

## 2022-12-19 RX ADMIN — HEPARIN SODIUM 5000 UNIT(S): 5000 INJECTION INTRAVENOUS; SUBCUTANEOUS at 21:23

## 2022-12-19 RX ADMIN — Medication 81 MILLIGRAM(S): at 12:03

## 2022-12-19 RX ADMIN — LACOSAMIDE 120 MILLIGRAM(S): 50 TABLET ORAL at 18:00

## 2022-12-19 RX ADMIN — Medication 650 MILLIGRAM(S): at 11:10

## 2022-12-19 RX ADMIN — HEPARIN SODIUM 5000 UNIT(S): 5000 INJECTION INTRAVENOUS; SUBCUTANEOUS at 05:08

## 2022-12-19 RX ADMIN — LEVETIRACETAM 400 MILLIGRAM(S): 250 TABLET, FILM COATED ORAL at 18:00

## 2022-12-19 RX ADMIN — Medication 650 MILLIGRAM(S): at 10:18

## 2022-12-19 RX ADMIN — Medication 10 MILLIGRAM(S): at 12:04

## 2022-12-19 RX ADMIN — LACOSAMIDE 120 MILLIGRAM(S): 50 TABLET ORAL at 05:03

## 2022-12-19 RX ADMIN — Medication 400 MILLIGRAM(S): at 18:00

## 2022-12-19 RX ADMIN — Medication 400 MILLIGRAM(S): at 18:51

## 2022-12-19 RX ADMIN — CHLORHEXIDINE GLUCONATE 1 APPLICATION(S): 213 SOLUTION TOPICAL at 12:05

## 2022-12-19 RX ADMIN — HEPARIN SODIUM 5000 UNIT(S): 5000 INJECTION INTRAVENOUS; SUBCUTANEOUS at 13:30

## 2022-12-19 NOTE — PROGRESS NOTE ADULT - SUBJECTIVE AND OBJECTIVE BOX
Subjective: Patient seen and examined. No new events except as noted.     REVIEW OF SYSTEMS:    Unable to obtain     MEDICATIONS:  MEDICATIONS  (STANDING):  aspirin enteric coated 81 milliGRAM(s) Oral daily  bisacodyl Suppository 10 milliGRAM(s) Rectal daily  chlorhexidine 2% Cloths 1 Application(s) Topical daily  dextrose 50% Injectable 25 Gram(s) IV Push once  dextrose 50% Injectable 12.5 Gram(s) IV Push once  dextrose 50% Injectable 25 Gram(s) IV Push once  dextrose Oral Gel 15 Gram(s) Oral once  diphtheria/tetanus/pertussis (acellular) Vaccine (Adacel) 0.5 milliLiter(s) IntraMuscular once  glucagon  Injectable 1 milliGRAM(s) IntraMuscular once  heparin   Injectable 5000 Unit(s) SubCutaneous every 8 hours  lacosamide IVPB 100 milliGRAM(s) IV Intermittent every 12 hours  levETIRAcetam  IVPB 1500 milliGRAM(s) IV Intermittent every 12 hours      PHYSICAL EXAM:  T(C): 36.3 (12-19-22 @ 07:40), Max: 37.2 (12-18-22 @ 20:41)  HR: 84 (12-19-22 @ 07:40) (84 - 89)  BP: 114/79 (12-19-22 @ 07:40) (110/68 - 122/77)  RR: 17 (12-19-22 @ 07:40) (17 - 18)  SpO2: 98% (12-19-22 @ 07:40) (95% - 98%)  Wt(kg): --  I&O's Summary          Appearance: NAD  HEENT: dry oral mucosa, PERRL, EOMI - L temporal abrasion	  Lymphatic: No lymphadenopathy , no edema  Cardiovascular: Normal S1 S2, No JVD, No murmurs , Peripheral pulses palpable 2+ bilaterally  Respiratory: Coarse BS	  Gastrointestinal:  Soft, Non-tender, + BS	  Skin: No rashes, + ecchymoses, No cyanosis, warm to touch - BL mittes  Neurological Exam:    Mental Status: sleepy, Oriented x 1, followig some simple. minimal verbal saying few words,   Cranial Nerves: PERRL, EOMI, VFFC, sensation V1-V3 intact,  no obvious facial asymmetry    Motor: Moving uppers > lowers. uppers at least 3-4/5 in mittens   Sensation: minimal withdrawal to noxious x 4  R>L   Reflexes: 1+ throughout at biceps, brachioradialis, triceps, patellars and ankles bilaterally and equal. No clonus. R toe and L toe were both downgoing.  Coordination: unable   Gait: unable   Ext: No edema      LABS:    CARDIAC MARKERS:                                10.6   15.26 )-----------( 479      ( 19 Dec 2022 07:05 )             32.5     12-19    134<L>  |  103  |  18  ----------------------------<  108<H>  4.2   |  22  |  0.73    Ca    8.3<L>      19 Dec 2022 07:05  Phos  3.7     12-19  Mg     2.30     12-19    TPro  6.5  /  Alb  2.4<L>  /  TBili  1.1  /  DBili  0.3  /  AST  62<H>  /  ALT  115<H>  /  AlkPhos  201<H>  12-19    proBNP:   Lipid Profile:   HgA1c:   TSH:             TELEMETRY: 	    ECG:  	  RADIOLOGY:   DIAGNOSTIC TESTING:  [ ] Echocardiogram:  [ ]  Catheterization:  [ ] Stress Test:    OTHER:

## 2022-12-19 NOTE — PROGRESS NOTE ADULT - SUBJECTIVE AND OBJECTIVE BOX
DATE OF SERVICE: 12-19-22 @ 11:47    INTERVAL HPI/OVERNIGHT EVENTS:  No complaints, no abdominal pain/discomfort    MEDICATIONS  (STANDING):  aspirin enteric coated 81 milliGRAM(s) Oral daily  bisacodyl Suppository 10 milliGRAM(s) Rectal daily  chlorhexidine 2% Cloths 1 Application(s) Topical daily  dextrose 50% Injectable 25 Gram(s) IV Push once  dextrose 50% Injectable 12.5 Gram(s) IV Push once  dextrose 50% Injectable 25 Gram(s) IV Push once  dextrose Oral Gel 15 Gram(s) Oral once  diphtheria/tetanus/pertussis (acellular) Vaccine (Adacel) 0.5 milliLiter(s) IntraMuscular once  glucagon  Injectable 1 milliGRAM(s) IntraMuscular once  heparin   Injectable 5000 Unit(s) SubCutaneous every 8 hours  lacosamide IVPB 100 milliGRAM(s) IV Intermittent every 12 hours  levETIRAcetam  IVPB 1500 milliGRAM(s) IV Intermittent every 12 hours    MEDICATIONS  (PRN):      Vital Signs Last 24 Hrs  T(C): 36.3 (19 Dec 2022 07:40), Max: 37.2 (18 Dec 2022 20:41)  T(F): 97.3 (19 Dec 2022 07:40), Max: 98.9 (18 Dec 2022 20:41)  HR: 84 (19 Dec 2022 07:40) (84 - 89)  BP: 114/79 (19 Dec 2022 07:40) (110/68 - 122/77)  BP(mean): --  RR: 17 (19 Dec 2022 07:40) (17 - 18)  SpO2: 98% (19 Dec 2022 07:40) (95% - 98%)    Parameters below as of 19 Dec 2022 07:40  Patient On (Oxygen Delivery Method): room air      I&O's Detail        Physical Exam:  General: WN/WD NAD  Respiratory: airway patent, respirations unlabored, no increased WoB  Neurology: A&Ox3, nonfocal, MILLER x 4  Abdominal: Soft ,NT/ND      LABS:                        10.6   15.26 )-----------( 479      ( 19 Dec 2022 07:05 )             32.5     12-19    134<L>  |  103  |  18  ----------------------------<  108<H>  4.2   |  22  |  0.73    Ca    8.3<L>      19 Dec 2022 07:05  Phos  3.7     12-19  Mg     2.30     12-19    TPro  6.5  /  Alb  2.4<L>  /  TBili  1.1  /  DBili  0.3  /  AST  62<H>  /  ALT  115<H>  /  AlkPhos  201<H>  12-19          RADIOLOGY & ADDITIONAL STUDIES:

## 2022-12-19 NOTE — PROGRESS NOTE ADULT - SUBJECTIVE AND OBJECTIVE BOX
Neurology Progress Note    S: Patient seen and examined , doing okay.     Medication:  MEDICATIONS  (STANDING):  aspirin enteric coated 81 milliGRAM(s) Oral daily  bisacodyl Suppository 10 milliGRAM(s) Rectal daily  chlorhexidine 2% Cloths 1 Application(s) Topical daily  dextrose 50% Injectable 25 Gram(s) IV Push once  dextrose 50% Injectable 12.5 Gram(s) IV Push once  dextrose 50% Injectable 25 Gram(s) IV Push once  dextrose Oral Gel 15 Gram(s) Oral once  diphtheria/tetanus/pertussis (acellular) Vaccine (Adacel) 0.5 milliLiter(s) IntraMuscular once  glucagon  Injectable 1 milliGRAM(s) IntraMuscular once  heparin   Injectable 5000 Unit(s) SubCutaneous every 8 hours  lacosamide IVPB 100 milliGRAM(s) IV Intermittent every 12 hours  levETIRAcetam  IVPB 1500 milliGRAM(s) IV Intermittent every 12 hours    MEDICATIONS  (PRN):  ibuprofen  Tablet. 400 milliGRAM(s) Oral every 6 hours PRN Mild Pain (1 - 3), Moderate Pain (4 - 6)        Vitals:    Vital Signs Last 24 Hrs  T(C): 36.6 (19 Dec 2022 17:15), Max: 37.2 (18 Dec 2022 20:41)  T(F): 97.8 (19 Dec 2022 17:15), Max: 98.9 (18 Dec 2022 20:41)  HR: 89 (19 Dec 2022 17:15) (84 - 89)  BP: 106/74 (19 Dec 2022 17:15) (106/74 - 122/77)  BP(mean): --  RR: 18 (19 Dec 2022 17:15) (17 - 18)  SpO2: 99% (19 Dec 2022 17:15) (95% - 99%)    Parameters below as of 19 Dec 2022 17:15  Patient On (Oxygen Delivery Method): room air                 General Exam:   General Appearance: Appropriately dressed and in no acute distress       Head: Normocephalic, atraumatic and no dysmorphic features  Ear, Nose, and Throat: Moist mucous membranes    CVS: S1S2+  Resp: No SOB, no wheeze or rhonchi  Abd: soft NTND  Extremities: No edema, no cyanosis  Skin: No bruises, no rashes     Neurological Exam:    Mental Status: Awake, Oriented x 2, followign some simple.  speaking more but slurred (+ tongue bite .    Cranial Nerves: PERRL, EOMI, VFFC, sensation V1-V3 intact,  no obvious facial asymmetry    Motor: Moving uppers > lowers. uppers at least 3-4/5 in mittens ; not moving LLE very well   Sensation:minimal withdrawal to noxious x 4  R>L   Reflexes: 1+ throughout at biceps, brachioradialis, triceps, patellars and ankles bilaterally and equal. No clonus. R toe and L toe were both downgoing.  Coordination: unable   Gait: unable     I personally reviewed the below data/images/labs:  CBC Full  -  ( 19 Dec 2022 07:05 )  WBC Count : 15.26 K/uL  RBC Count : 3.50 M/uL  Hemoglobin : 10.6 g/dL  Hematocrit : 32.5 %  Platelet Count - Automated : 479 K/uL  Mean Cell Volume : 92.9 fL  Mean Cell Hemoglobin : 30.3 pg  Mean Cell Hemoglobin Concentration : 32.6 gm/dL  Auto Neutrophil # : 9.80 K/uL  Auto Lymphocyte # : 1.50 K/uL  Auto Monocyte # : 1.21 K/uL  Auto Eosinophil # : 0.71 K/uL  Auto Basophil # : 0.21 K/uL  Auto Neutrophil % : 64.2 %  Auto Lymphocyte % : 9.8 %  Auto Monocyte % : 7.9 %  Auto Eosinophil % : 4.7 %  Auto Basophil % : 1.4 %    12-19    134<L>  |  103  |  18  ----------------------------<  108<H>  4.2   |  22  |  0.73    Ca    8.3<L>      19 Dec 2022 07:05  Phos  3.7     12-19  Mg     2.30     12-19    TPro  6.5  /  Alb  2.4<L>  /  TBili  1.1  /  DBili  0.3  /  AST  62<H>  /  ALT  115<H>  /  AlkPhos  201<H>  12-19    < from: CT Head No Cont (12.02.22 @ 20:27) >    ACC: [id number] EXAM:  CT CERVICAL SPINE                        ACC: [id number] EXAM:  CT MAXILLOFACIAL                        ACC: [id number] EXAM:  CT BRAIN                          PROCEDURE DATE:  12/02/2022        INTERPRETATION:  CLINICAL INDICATION: Trauma.    Technique: Noncontrast axial CT of the head, facial bones, and cervical   spine was performed. 3-D reformats of the facial bones was obtained.   Coronal and sagittal reformats were obtained.      COMPARISON: None.    FINDINGS:  Head CT:  The ventricles and sulci are within normal limits. 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 There is no intraparenchymal hematoma, mass effect   or midline shift. No abnormal extra-axial fluid collections or   hemorrhages are present.    There is swelling of the left lateral scalp. The calvarium is intact.   There are scattered mucosal inflammatory changes in the paranasal sinuses.      Cervical spine CT:  Alignment ismaintained. Vertebral bodies are normal in height, without   evidence of fracture or dislocation. Prevertebral soft tissues are within   normal limits without soft tissue swelling or hematoma.    Intervertebral discs are intact. Neural foramina and spinal canal are   intact.    The visualized lung apices are within normal limits.      Facial bone CT:    No acute facial fracture.    There are scattered mucosal inflammatory changes in the paranasal   sinuses. Mastoid air cells are clear.    Soft tissues appear unremarkable.        IMPRESSION:  CT HEAD: No acute abnormality.  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  CT CERVICAL SPINE: No acute abnormality  CT FACIAL BONE: No acute abnormality    --- End of Report ---       DELGADO IVAN MD; Attending Radiologist  This document has been electronically signed. Dec  2 2022  9:02PM    < end of copied text >  < from: CT Lumbar Spine No Cont (12.02.22 @ 20:27) >    ACC: [id number] EXAM:  CT LUMBAR SPINE                        ACC: [id number] EXAM:  CT THORACIC SPINE                          PROCEDURE DATE:  12/02/2022          INTERPRETATION:  CT thoracic and lumbar spine without IV contrast    CLINICAL INFORMATION:  Trauma  Back pain, fracture.    TECHNIQUE:  Contiguous axial 2 mm sections were obtained through the   thoracic and lumbar spine using a single helical acquisition.     Additional 2 mm sagittal and coronal reconstructions of the spine were   obtained. These additional reformatted images were used to evaluate the   spine for alignment, vertebral fractures and the integrity of the the   posterior elements.   This scan was performed using automatic exposure   control (radiation dose reduction software) to obtain a diagnostic image   quality scan with patient dose as low as reasonably achievable.    FINDINGS:   No prior similar studies are available for review    Thoracic and lumbar vertebral body heights are maintained. No vertebral   fracture is seen. No destructive bone lesion is found.  Alignment is   preserved.  Facet joints appear intact and aligned.    Thoracic and lumbar intervertebral disc spaces appear intact.   Degenerative disc disease and spondylosis is noted at T6-T7 throughL4-5   with loss of disc height and associated degenerative endplate changes.   Mild disc bulges at L2-3 through L4-5 flatten the ventral thecal sac and   narrow the BILATERAL neural foramina.    No paraspinal mass is recognized.  Paraspinal soft tissues appear intact.      IMPRESSION:  Degenerative disc disease and spondylosis is noted at T6-T7   through L4-5 with loss of disc height and associated degenerative   endplate changes. Mild disc bulges at L2-3 through L4-5 flatten the   ventral thecal sac and narrow the BILATERAL neural foramina   No   vertebral fracture is recognized.    --- End of Report ---       ANGIE ESCOBAR MD; Attending Radiologist  This document has been electronically signed. Dec  2 2022  8:55PM    < end of copied text >    EEG Classification / Summary:  Abnormal EEG in a comatose patient due to diffuse suppression gradually improving to discontinuous background, with right hemispheric relative attenuation/suppression. No epileptiform abnormalities or seizures are captured.    Clinical Impression:  Severe diffuse cerebral dysfunction that gradually improves is nonspecific in etiology. In this case, it may be related to sedating medication (propofol) with improvement after decreasing and stopping the medication.  Right hemispheric focal cerebral dysfunction can be structural or functional in etiology.      12/7  Clinical Impression:  -Occasional runs of right frontal lateralized periodic discharges (LPDs) at up to 1.3 Hz indicate a potentially epileptogenic focus in the right frontal region and are on the ictal-interictal continuum.  -A pattern on the ictal-interictal continuum is a pattern that does not qualify as an electrographic seizure or electrographic status epilepticus, but there is a reasonable chance that it may be contributing to impaired alertness, causing other clinical symptoms, and/or contributing to neuronal injury.  -Right hemispheric relative attenuation indicates right hemispheric focal cerebral dysfunction, which can be structural or functional in etiology.  -Rare left frontal epileptiform discharges indicate a potentially epileptogenic focus in this reigon  -Severe diffuse slowing and GRDA indicate severe diffuse cerebral dysfunction of nonspecific etiology.  -No electrographic seizures are captured.     < from: MR Head w/wo IV Cont (12.09.22 @ 19:54) >    ACC: [id number] EXAM:  MR BRAIN WAW IC                          PROCEDURE DATE:  12/09/2022          INTERPRETATION:  CLINICAL INDICATION: CVA, found unresponsive at home      Magnetic resonance imaging of the brain was carried out with transaxial   SPGR, FLAIR, fast spin echo T2 weighted images, axial susceptibility   weighted series, diffusion weighted series and sagittal T1 weighted   series on a 1.5 Maritza magnet. Post contrast axial, coronal and sagittal   T1 weighted images were obtained. 10cc of Gadavist were intravenously   injected, 0 cc were discarded.      Comparison is made with the prior brain CT of 12/5/2022 and MRI 11/5/2022.    Mild ventricular and sulcal prominence is consistent with moderate   atrophy for the patient's age. No acute hemorrhage is identified. There   is a focus of hemosiderin within the alexis which is calcified on CT.   Scattered additional foci of hemosiderin deposition are identified in the   left frontal subcortical white matter and right occipital cortex is   nonspecific but may be related to multiple cavernoma is or hypertension.    There is a tiny focus of diffusion restriction in the right frontal   subcortical white matter and right centrum semiovale which are unchanged   since the prior exam and may represent subacute infarcts. Multiplicity   infarcts may be related to hypercoagulable state or cardioembolic events.    After contrast administration there is normal intracranial vascular   enhancement. No abnormal parenchymal or leptomeningeal enhancement.      IMPRESSION: Atrophy for the patient's age. Right frontal subcortical and   right centrum semiovale punctate infarcts are unchanged since 11/5/2022   and likely represent subacute infarcts. No abnormal enhancement. Focus of   calcification in the alexis with old hemosiderin. A few additional   scattered foci of hemosiderin deposition are unchanged.    --- End of Report ---            JUAN MANUEL CASTILLO MD; Attending Radiologist  This document has been electronically signed. Dec  9 2022  8:05PM    < end of copied text >

## 2022-12-19 NOTE — PROGRESS NOTE ADULT - SUBJECTIVE AND OBJECTIVE BOX
Name of Patient : TAMI DUNHAM  MRN: [id number]  Date of visit: 12-19-22       Subjective: Patient seen and examined. No new events except as noted.   Doing okay       REVIEW OF SYSTEMS:    CONSTITUTIONAL: No weakness, fevers or chills  EYES/ENT: No visual changes;  No vertigo or throat pain   NECK: No pain or stiffness  RESPIRATORY: No cough, wheezing, hemoptysis; No shortness of breath  CARDIOVASCULAR: No chest pain or palpitations  GASTROINTESTINAL: No abdominal or epigastric pain. No nausea, vomiting, or hematemesis; No diarrhea or constipation. No melena or hematochezia.  GENITOURINARY: No dysuria, frequency or hematuria  NEUROLOGICAL: No numbness or weakness  SKIN: No itching, burning, rashes, or lesions   All other review of systems is negative unless indicated above.    MEDICATIONS:  MEDICATIONS  (STANDING):  aspirin enteric coated 81 milliGRAM(s) Oral daily  bisacodyl Suppository 10 milliGRAM(s) Rectal daily  chlorhexidine 2% Cloths 1 Application(s) Topical daily  dextrose 50% Injectable 25 Gram(s) IV Push once  dextrose 50% Injectable 12.5 Gram(s) IV Push once  dextrose 50% Injectable 25 Gram(s) IV Push once  dextrose Oral Gel 15 Gram(s) Oral once  diphtheria/tetanus/pertussis (acellular) Vaccine (Adacel) 0.5 milliLiter(s) IntraMuscular once  glucagon  Injectable 1 milliGRAM(s) IntraMuscular once  heparin   Injectable 5000 Unit(s) SubCutaneous every 8 hours  lacosamide IVPB 100 milliGRAM(s) IV Intermittent every 12 hours  levETIRAcetam  IVPB 1500 milliGRAM(s) IV Intermittent every 12 hours      PHYSICAL EXAM:  T(C): 36.6 (12-19-22 @ 21:00), Max: 37 (12-19-22 @ 04:59)  HR: 86 (12-19-22 @ 21:00) (84 - 89)  BP: 132/84 (12-19-22 @ 21:00) (106/74 - 132/84)  RR: 17 (12-19-22 @ 21:00) (17 - 18)  SpO2: 99% (12-19-22 @ 21:00) (96% - 99%)  Wt(kg): --  I&O's Summary        Appearance: Normal	  HEENT:  PERRLA   Lymphatic: No lymphadenopathy   Cardiovascular: Normal S1 S2, no JVD  Respiratory: normal effort , clear  Gastrointestinal:  Soft, Non-tender  Skin: No rashes,  warm to touch  Psychiatry:  Mood & affect appropriate  Musculuskeletal: No edema      All labs, Imaging and EKGs personally reviewed                             10.6   15.26 )-----------( 479      ( 19 Dec 2022 07:05 )             32.5               12-19    134<L>  |  103  |  18  ----------------------------<  108<H>  4.2   |  22  |  0.73    Ca    8.3<L>      19 Dec 2022 07:05  Phos  3.7     12-19  Mg     2.30     12-19    TPro  6.5  /  Alb  2.4<L>  /  TBili  1.1  /  DBili  0.3  /  AST  62<H>  /  ALT  115<H>  /  AlkPhos  201<H>  12-19

## 2022-12-19 NOTE — PROGRESS NOTE ADULT - ASSESSMENT
52-year-old  M known to me from outpatient setting with  TIAs (2011, 2013), Strokes x3 (02/2022 s/p tPA, 8/3/2022 s/p tPA, 11/5/2022 with residual expressive aphasia) on Eliquis, was on testosterone outpatient stopped after last stroke, HLD BIBEMS after being found unresponsive  Temp 105.4 on arrival tachy and /110. 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   CT cervical spine and maxillofacial scans negative.  CT chest, abdomen, and pelvis w/ contrast concerning for possible partial SBO without transition point. Surgery consulted in ED, no acute surgical intervention at this time.   CT thoracic and lumbar spine showing degenerative disc disease T6-T7 through L4-L5 and mild disc bulges at L2-L3 through L4-L5 that flatten the ventral thecal sac and narrowing of the bilateral neural foramina.  MRI 11/2022: R centrum semiovale and R posterior frontal infarcts   takes adderall at home   + rhabdo  EEG no seizures   + transaminitis   CTH 12/5 stable   outpatient hypercoag workup neg   12/5 extubated then reintibuated for seizure activity and tx back to ICU   EEG this AM 12/5 with only slowing   EEG 12/6 slowing but no seizures   spoke with Pippa Conti (friend) who states after he was taken off the testosterone he ordered a mail order testosterone supplement, unclear if he started it yet, unclear what this was  12/7 EEG no electrogtraphic seizures captured but R LPDs, rare L frontal discharges, severe GRDA.   12/8 EEG improved.  extubated. following some commands   12/9 moving uppers> lowers   12/11 AAOx2 uppers 4-5/5; not moving LLE well   MRI brain neg for new stroke   s/p L knee tap arthrocentesis  by ortho on 12/12   spoke with friend pippa conti - she counted his adderrall and didn't take extra tables. did find ashwagandha and red wood (bottle was sealed) supplement in his apartment   s/p LP 12/5 --> CSF glucose and protein WNL.  will f/u remaining studies. non infectious appearing   o/e AAOx2, slurred but 2/2 tongue bite   + transaminitis   more alert    Impression:   1) AMS of unclear etiology, possible now seizure, related to adderral withdrawal? 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 had MIMI was question of PFO but not found. s/p ILR      - resume AC s/p LP   - possible MRCP planning for Monday. GI f/u.  elevated LFTs --> LFTs improved. MCP deferred   - ortho for L knee s/p tap / arthrocentesis   -  Vimpat 100mg BID    - keppra 1500mg BID.   - fever on arrival, treatred initially for meningitis.  was on abx for aspiration PNA.  >LP done--> non  infectious   - monitor LFTs , downtrending   - IVF  - CPK elevated. trend   - would consider endocrine   - there was some concern outpatient he may have a mixed  connective tissue disorder   - statin therapy for secondary stroke prevention when LFTS and CPK improve   - telemetry  - PT/OT/SS/SLP, OOBC  - check FS, glucose control <180  - GI/DVT ppx  - Thank you for allowing me to participate in the care of this patient. Call with questions.   - spoke with Pippa Conti at [phone] for more collateral info and to provide update. spoke again 12/12 over phone. spoke 12/19   - spoke with friend at bedside, [place] 12/11   Braxton Forde MD  Vascular Neurology  Office: [phone]

## 2022-12-19 NOTE — PROGRESS NOTE ADULT - COMMENTS
nonverbal, cannot get ROS
more awake, poor historian

## 2022-12-19 NOTE — PROGRESS NOTE ADULT - ASSESSMENT
52-year-old male with a PMHx significant for TIAs (2011, 2013), CVAs x3 (02/2022 s/p tPA, 8/3/2022 s/p tPA, 11/5/2022 with residual expressive aphasia) on Eliquis, and HLD BIBEMS after being found unresponsive at home with possible aspiration pna, s/p micu stay, leukocytosis, knee hemarthrosis, fever    Wong Ramírez  Attending Physician   Division of Infectious Disease  Office #[phone]  Available on [employer] Teams also  After 5pm/weekend or no response, call #[phone]

## 2022-12-19 NOTE — PROGRESS NOTE ADULT - ASSESSMENT
52M with elevated LFTs    LFTs continue to downtrend, WBC downtrending  Can hold on MRCP unless clinical change or increase in LFTs again  No further surgical intervention at this time, continue medicine team care  Please recall as needed

## 2022-12-19 NOTE — PROGRESS NOTE ADULT - RESPIRATORY
clear to auscultation bilaterally/no wheezes

## 2022-12-19 NOTE — PROGRESS NOTE ADULT - SUBJECTIVE AND OBJECTIVE BOX
[facility]   MD SIVA CLAUDIO MD KRISTINE SOLTANPOUR, DO ANGELA WONG, PA    TEL:  OFFICE: [phone]  From 5pm-7am Answering Service [phone]    -- RENAL FOLLOW UP NOTE ---Date of Service 12-19-22 @ 14:07    Patient is a 52y old  Male who presents with a chief complaint of Unresponsive (19 Dec 2022 11:47)      Patient seen and examined at bedside. No chest pain/sob    VITALS:  T(F): 97.5 (12-19-22 @ 12:03), Max: 98.9 (12-18-22 @ 20:41)  HR: 86 (12-19-22 @ 12:03)  BP: 118/78 (12-19-22 @ 12:03)  RR: 18 (12-19-22 @ 12:03)  SpO2: 97% (12-19-22 @ 12:03)  Wt(kg): --        PHYSICAL EXAM:  General: NAD  Neck: No JVD  Respiratory: CTAB, no wheezes, rales or rhonchi  Cardiovascular: S1, S2, RRR  Gastrointestinal: BS+, soft, NT/ND  Extremities: No peripheral edema    Hospital Medications:   MEDICATIONS  (STANDING):  aspirin enteric coated 81 milliGRAM(s) Oral daily  bisacodyl Suppository 10 milliGRAM(s) Rectal daily  chlorhexidine 2% Cloths 1 Application(s) Topical daily  dextrose 50% Injectable 25 Gram(s) IV Push once  dextrose 50% Injectable 12.5 Gram(s) IV Push once  dextrose 50% Injectable 25 Gram(s) IV Push once  dextrose Oral Gel 15 Gram(s) Oral once  diphtheria/tetanus/pertussis (acellular) Vaccine (Adacel) 0.5 milliLiter(s) IntraMuscular once  glucagon  Injectable 1 milliGRAM(s) IntraMuscular once  heparin   Injectable 5000 Unit(s) SubCutaneous every 8 hours  lacosamide IVPB 100 milliGRAM(s) IV Intermittent every 12 hours  levETIRAcetam  IVPB 1500 milliGRAM(s) IV Intermittent every 12 hours      LABS:  12-19    134<L>  |  103  |  18  ----------------------------<  108<H>  4.2   |  22  |  0.73    Ca    8.3<L>      19 Dec 2022 07:05  Phos  3.7     12-19  Mg     2.30     12-19    TPro  6.5  /  Alb  2.4<L>  /  TBili  1.1  /  DBili  0.3  /  AST  62<H>  /  ALT  115<H>  /  AlkPhos  201<H>  12-19    Creatinine Trend: 0.73 <--, 0.76 <--, 0.65 <--, 0.66 <--, 0.69 <--, 0.73 <--, 0.70 <--, 0.77 <--, 0.85 <--    Phosphorus Level, Serum: 3.7 mg/dL (12-19 @ 07:05)  Albumin, Serum: 2.4 g/dL (12-19 @ 07:05)                              10.6   15.26 )-----------( 479      ( 19 Dec 2022 07:05 )             32.5     Urine Studies:  Urinalysis - [12-05-22 @ 21:00]      Color Yellow / Appearance Clear / SG 1.048 / pH 7.5      Gluc Negative / Ketone Moderate  / Bili Negative / Urobili <2 mg/dL       Blood Negative / Protein 100 mg/dL / Leuk Est Negative / Nitrite Negative      RBC 0 / WBC 1 / Hyaline  / Gran  / Sq Epi  / Non Sq Epi  / Bacteria     Urine Sodium 118      [12-15-22 @ 19:53]  Urine Osmolality 882      [12-15-22 @ 19:53]    Iron 49, TIBC 142, %sat 35      [12-14-22 @ 04:30]  Ferritin 692      [12-14-22 @ 04:30]  PTH -- (Ca --)      [12-18-22 @ 06:46]   27  Vitamin D (25OH) 44.1      [12-18-22 @ 06:46]  TSH 1.30      [12-05-22 @ 02:30]  Lipid: chol 131, , HDL 34, LDL --      [11-05-22 @ 06:57]        RADIOLOGY & ADDITIONAL STUDIES:

## 2022-12-19 NOTE — PROGRESS NOTE ADULT - GASTROINTESTINAL
soft/nontender/nondistended/no guarding

## 2022-12-19 NOTE — PROGRESS NOTE ADULT - ASSESSMENT
52-year-old male with a PMHx significant for TIAs (2011, 2013), CVAs x3 (02/2022 s/p tPA, 8/3/2022 s/p tPA, 11/5/2022 with residual expressive aphasia) on Eliquis, and HLD BIBEMS after being found unresponsive at home on the floor, last known well 12/1 at around noon. C-collar was placed. Patient was intubated in the ED for airway protection. CTH with no acute findings, vEEG with no identified seizures. Patient was weaned off pressors, extubated, and transitioned to floors 12/4/22. 12/5 am RRT called for seizure like activity with urinary incontinence, tongue biting, and R>L posturing, patient s/p ativan 2mg x3. Anesthesia called to RRT for intubation. MICU accepting patient for seizure like activity requiring intubation.      #AMS, Seizure like activity  EEG 12/7: concern for epileptiform activity; though EEG from 12/5 without such abnormalities.   - f/u neuro recs  - Patient found down and unresponsive at home on 12/2, last known well 12/1 at around noon. Intubated in ED 12/2, extubated in MICU 12/4  - RRT 12/5: seizure like acting with urinary incontinence, convulsions, and tongue biting; s/p ativan 2mg x3 with pauses in seizure activity following each; intubated for airway protection with rocuronium. Prolactin ~75   - CTH and CT cervical spine 12/2 negative for any acute pathologies. Tox screen on admission negative. 12/5: Stable exam from prior CT head, chronic microvascular ischemic changes, parenchymal loss, dystrophic calcification of central portion of alexis unchanged.  - Video EEG 12/3-12/4 without any seizure like activity, moderate to severe nonspecific diffuse or multifocal cerebral dysfunction  - c/w keppra 1500mg q12 maintenance, Keppra loaded 4.5g  - patient extubated again on 12/8th  -MRI noted, chronci CVA, no acute CVA noted         #CVAs/TIAs  - Hx of TIAs in 2011 and 2013, CVAs x3 in 02/22, 8/22, and 11/22 with residual expressive aphasia   - on Eliquis and Aspirin at home for hx of stroke  - PFO work up in past negative, no evidence of PFO on MIMI X2  - c/w ASA   - c/w heparin gtt  - Restart Statin when LFTs and CPK improve per neurology  - Being worked up for Mixed Connective Tissue Disease OP- f/u p-ANCA, c-ANCA/ [id number] negative  - Neuro recs appreciated    # Hypernatremia  speech and swallow, possible to start oral hydration   tredn Na level   renal eval appreciated  S/P hydraiton, trend Na level     #Thecal sac flattening   - CT thoracic and lumbar spine showing degenerative disc disease T6-T7 through L4-L5 and mild disc bulges at L2-L3 through L4-L5 that flatten the ventral thecal sac and narrowing of the bilateral neural foramina  - Will Monitor for now, will consider neuro/neurosurg evaluation in the future    #HLD  - atorvastatin on hold due to elevated CK and LFTs  - restart when possible for secondary stroke prevention    #?ASD/PFO  - Patient reportedly found to have a PFO in February 2022 during a stroke workup at [place]. Patient presented for a PFO closure in November 2022. Notably, no PFO was found at the time and no intervention was performed by Dr. Lynn.    - TTE 11/5/22 EF 57% and grossly normal LV function  - MIMI performed bedside 12/5 1 of 2 studies (+) on bubble study (uploaded to [facility])        #Rhabdomyolysis  - CK 8720 on admission, peaked at 15k, now downtrending  - DDx: prolonged downtime myositis v hyperthermia? (T 105.4 F) v sepsis v seizure induced        # Elevated Winston level  CHeck Abd US noted    GI eval PRN   Trend LFTs , normalized, no need for MRCP       #Partial SBO - resolved  - CT chest, abdomen, and pelvis w/ contrast concerning for possible partial SBO without transition point.  - Surgery consulted, no acute intervention at this time  - 12/5 KUB - negative for ileus or SBO  - Hold TF for 12/7 for possible trial of extubation.  - otherwise diet per nutrition      #Leukocytosis  - worsening leukocytosis  - monitor for fever  - Ortho eval fro knee swelling   - Pan Cx  - plan for LP by IR         #Concern for sepsis  Unclear source, aspiration v less likely meningitis   BCx (12/2 NGTD repeat 12/5 NGTD) and UC 12/2 NGTD  - Patient initially presented with AMS, T 105.4, tachycardic, and tachypneic   - UA negative  - s/p vanco and zosyn x1 in ED 12/2, ceftriaxone 2g BID 12/3-12/4, vanco 12/3-12/4  - 12/7 DCed Vanc since BCx from 12/5 NGTD  - c/w zosyn for aspiration pna presumed. completed course, monitor   - ID eval appreciated  - febrile again, Ortho for knee asp      # ANemia  noted, no gross bleeding  type and screen  GI eval

## 2022-12-19 NOTE — PROGRESS NOTE ADULT - SUBJECTIVE AND OBJECTIVE BOX
TAMI DUNHAM 52y [id number]    Patient is a 52y old  Male who presents with a chief complaint of Unresponsive (19 Dec 2022 14:07)      Follow Up/CC:  ID following for fever    Interval History/ROS: no fever     Allergies    No Known Allergies    Intolerances        ANTIMICROBIALS:      MEDICATIONS  (STANDING):  aspirin enteric coated 81 milliGRAM(s) Oral daily  bisacodyl Suppository 10 milliGRAM(s) Rectal daily  chlorhexidine 2% Cloths 1 Application(s) Topical daily  dextrose 50% Injectable 25 Gram(s) IV Push once  dextrose 50% Injectable 12.5 Gram(s) IV Push once  dextrose 50% Injectable 25 Gram(s) IV Push once  dextrose Oral Gel 15 Gram(s) Oral once  diphtheria/tetanus/pertussis (acellular) Vaccine (Adacel) 0.5 milliLiter(s) IntraMuscular once  glucagon  Injectable 1 milliGRAM(s) IntraMuscular once  heparin   Injectable 5000 Unit(s) SubCutaneous every 8 hours  lacosamide IVPB 100 milliGRAM(s) IV Intermittent every 12 hours  levETIRAcetam  IVPB 1500 milliGRAM(s) IV Intermittent every 12 hours    MEDICATIONS  (PRN):        Vital Signs Last 24 Hrs  T(C): 36.4 (19 Dec 2022 12:03), Max: 37.2 (18 Dec 2022 20:41)  T(F): 97.5 (19 Dec 2022 12:03), Max: 98.9 (18 Dec 2022 20:41)  HR: 86 (19 Dec 2022 12:03) (84 - 89)  BP: 118/78 (19 Dec 2022 12:03) (110/68 - 122/77)  BP(mean): --  RR: 18 (19 Dec 2022 12:03) (17 - 18)  SpO2: 97% (19 Dec 2022 12:03) (95% - 98%)    Parameters below as of 19 Dec 2022 12:03  Patient On (Oxygen Delivery Method): room air        CBC Full  -  ( 19 Dec 2022 07:05 )  WBC Count : 15.26 K/uL  RBC Count : 3.50 M/uL  Hemoglobin : 10.6 g/dL  Hematocrit : 32.5 %  Platelet Count - Automated : 479 K/uL  Mean Cell Volume : 92.9 fL  Mean Cell Hemoglobin : 30.3 pg  Mean Cell Hemoglobin Concentration : 32.6 gm/dL  Auto Neutrophil # : 9.80 K/uL  Auto Lymphocyte # : 1.50 K/uL  Auto Monocyte # : 1.21 K/uL  Auto Eosinophil # : 0.71 K/uL  Auto Basophil # : 0.21 K/uL  Auto Neutrophil % : 64.2 %  Auto Lymphocyte % : 9.8 %  Auto Monocyte % : 7.9 %  Auto Eosinophil % : 4.7 %  Auto Basophil % : 1.4 %    12-19    134<L>  |  103  |  18  ----------------------------<  108<H>  4.2   |  22  |  0.73    Ca    8.3<L>      19 Dec 2022 07:05  Phos  3.7     12-19  Mg     2.30     12-19    TPro  6.5  /  Alb  2.4<L>  /  TBili  1.1  /  DBili  0.3  /  AST  62<H>  /  ALT  115<H>  /  AlkPhos  201<H>  12-19    LIVER FUNCTIONS - ( 19 Dec 2022 07:05 )  Alb: 2.4 g/dL / Pro: 6.5 g/dL / ALK PHOS: 201 U/L / ALT: 115 U/L / AST: 62 U/L / GGT: x               MICROBIOLOGY:  .CSF CSF  12-15-22   Culture is being performed.  --    polymorphonuclear leukocytes seen  No organisms seen  by cytocentrifuge      .Blood Blood-Peripheral  12-12-22   No Growth Final  --  --      .Blood Blood-Peripheral  12-12-22   No Growth Final  --  --      .Body Fluid Synovial Fluid  12-12-22   No growth at 5 days  --    polymorphonuclear leukocytes  No organisms seen  by cytocentrifuge      .Blood Blood-Venous  12-05-22   No Growth Final  --  --      .Blood Blood-Peripheral  12-02-22   No Growth Final  --  --      .Blood Blood-Peripheral  12-02-22   No Growth Final  --  --      Catheterized Catheterized  12-02-22   <10,000 CFU/mL Normal Urogenital Narda  --  --        Rapid RVP Result: NotDetec (12-12 @ 17:15)          RADIOLOGY    < from: US Abdomen Complete (US Abdomen Complete .) (12.12.22 @ 12:35) >  Cholelithiasis and gallbladder sludge. No sonographic evidence for acute   cholecystitis. No biliary duct dilation.    < end of copied text >

## 2022-12-19 NOTE — PROGRESS NOTE ADULT - ASSESSMENT
52-year-old male with a PMHx significant for TIAs (2011, 2013), CVAs x3 (02/2022 s/p tPA, 8/3/2022 s/p tPA, 11/5/2022 with residual expressive aphasia) on Eliquis, and HLD BIBEMS after being found unresponsive at home.nephrology consulted for hypernatremia    Hypernatremia  likely sec to dehydration  improved  monitor closely  avoid overcorrection    hyponatremia  hyponatremia today  check urine na and osmo to r/o SIADH  free water restriction <1.2L/day  monitor    hypocalcemia  low alb  stable  Vit d 25 optimal  monitor 52-year-old male with a PMHx significant for TIAs (2011, 2013), CVAs x3 (02/2022 s/p tPA, 8/3/2022 s/p tPA, 11/5/2022 with residual expressive aphasia) on Eliquis, and HLD BIBEMS after being found unresponsive at home.nephrology consulted for hypernatremia    Hypernatremia  Secondary to dehydration  Hypernatremia resolved. Now mild hyponatremia.   improved  monitor closely  avoid overcorrection    hyponatremia  hyponatremia today  check urine na and osmo to r/o SIADH  free water restriction <1.2L/day  monitor    hypocalcemia  low alb  stable  Vit d 25 optimal  monitor

## 2022-12-20 LAB
ALBUMIN SERPL ELPH-MCNC: 2.5 G/DL — LOW (ref 3.3–5)
ALP SERPL-CCNC: 183 U/L — HIGH (ref 40–120)
ALT FLD-CCNC: 93 U/L — HIGH (ref 4–41)
ANION GAP SERPL CALC-SCNC: 10 MMOL/L — SIGNIFICANT CHANGE UP (ref 7–14)
ANISOCYTOSIS BLD QL: SLIGHT — SIGNIFICANT CHANGE UP
AST SERPL-CCNC: 50 U/L — HIGH (ref 4–40)
BILIRUB SERPL-MCNC: 1 MG/DL — SIGNIFICANT CHANGE UP (ref 0.2–1.2)
BUN SERPL-MCNC: 16 MG/DL — SIGNIFICANT CHANGE UP (ref 7–23)
CALCIUM SERPL-MCNC: 8.5 MG/DL — SIGNIFICANT CHANGE UP (ref 8.4–10.5)
CHLORIDE SERPL-SCNC: 102 MMOL/L — SIGNIFICANT CHANGE UP (ref 98–107)
CO2 SERPL-SCNC: 23 MMOL/L — SIGNIFICANT CHANGE UP (ref 22–31)
CREAT SERPL-MCNC: 0.71 MG/DL — SIGNIFICANT CHANGE UP (ref 0.5–1.3)
EGFR: 110 ML/MIN/1.73M2 — SIGNIFICANT CHANGE UP
GIANT PLATELETS BLD QL SMEAR: PRESENT — SIGNIFICANT CHANGE UP
GLUCOSE SERPL-MCNC: 100 MG/DL — HIGH (ref 70–99)
HCT VFR BLD CALC: 35.2 % — LOW (ref 39–50)
HGB BLD-MCNC: 11.3 G/DL — LOW (ref 13–17)
MACROCYTES BLD QL: SIGNIFICANT CHANGE UP
MAGNESIUM SERPL-MCNC: 2.1 MG/DL — SIGNIFICANT CHANGE UP (ref 1.6–2.6)
MANUAL SMEAR VERIFICATION: SIGNIFICANT CHANGE UP
MCHC RBC-ENTMCNC: 29.9 PG — SIGNIFICANT CHANGE UP (ref 27–34)
MCHC RBC-ENTMCNC: 32.1 GM/DL — SIGNIFICANT CHANGE UP (ref 32–36)
MCV RBC AUTO: 93.1 FL — SIGNIFICANT CHANGE UP (ref 80–100)
METAMYELOCYTES # FLD: 3.6 % — HIGH (ref 0–1)
MICROCYTES BLD QL: SLIGHT — SIGNIFICANT CHANGE UP
MYELOCYTES NFR BLD: 2.7 % — HIGH (ref 0–0)
NRBC # BLD: 1 /100 — HIGH (ref 0–0)
OVALOCYTES BLD QL SMEAR: SLIGHT — SIGNIFICANT CHANGE UP
PHOSPHATE SERPL-MCNC: 3.6 MG/DL — SIGNIFICANT CHANGE UP (ref 2.5–4.5)
PLAT MORPH BLD: NORMAL — SIGNIFICANT CHANGE UP
PLATELET # BLD AUTO: 575 K/UL — HIGH (ref 150–400)
PLATELET COUNT - ESTIMATE: ABNORMAL
POIKILOCYTOSIS BLD QL AUTO: SLIGHT — SIGNIFICANT CHANGE UP
POLYCHROMASIA BLD QL SMEAR: SLIGHT — SIGNIFICANT CHANGE UP
POTASSIUM SERPL-MCNC: 4.1 MMOL/L — SIGNIFICANT CHANGE UP (ref 3.5–5.3)
POTASSIUM SERPL-SCNC: 4.1 MMOL/L — SIGNIFICANT CHANGE UP (ref 3.5–5.3)
PROT SERPL-MCNC: 6.7 G/DL — SIGNIFICANT CHANGE UP (ref 6–8.3)
RBC # BLD: 3.78 M/UL — LOW (ref 4.2–5.8)
RBC # FLD: 15.1 % — HIGH (ref 10.3–14.5)
RBC BLD AUTO: NORMAL — SIGNIFICANT CHANGE UP
SODIUM SERPL-SCNC: 135 MMOL/L — SIGNIFICANT CHANGE UP (ref 135–145)
SPHEROCYTES BLD QL SMEAR: SLIGHT — SIGNIFICANT CHANGE UP
WBC # BLD: 14.78 K/UL — HIGH (ref 3.8–10.5)
WBC # FLD AUTO: 14.78 K/UL — HIGH (ref 3.8–10.5)

## 2022-12-20 RX ORDER — OXYCODONE HYDROCHLORIDE 5 MG/1
5 TABLET ORAL EVERY 6 HOURS
Refills: 0 | Status: DISCONTINUED | OUTPATIENT
Start: 2022-12-20 | End: 2022-12-27

## 2022-12-20 RX ORDER — LIDOCAINE 4 G/100G
1 CREAM TOPICAL EVERY 24 HOURS
Refills: 0 | Status: DISCONTINUED | OUTPATIENT
Start: 2022-12-20 | End: 2023-01-11

## 2022-12-20 RX ADMIN — LIDOCAINE 1 PATCH: 4 CREAM TOPICAL at 17:58

## 2022-12-20 RX ADMIN — Medication 81 MILLIGRAM(S): at 13:30

## 2022-12-20 RX ADMIN — HEPARIN SODIUM 5000 UNIT(S): 5000 INJECTION INTRAVENOUS; SUBCUTANEOUS at 13:31

## 2022-12-20 RX ADMIN — HEPARIN SODIUM 5000 UNIT(S): 5000 INJECTION INTRAVENOUS; SUBCUTANEOUS at 05:32

## 2022-12-20 RX ADMIN — LIDOCAINE 1 PATCH: 4 CREAM TOPICAL at 18:16

## 2022-12-20 RX ADMIN — Medication 400 MILLIGRAM(S): at 14:22

## 2022-12-20 RX ADMIN — Medication 400 MILLIGRAM(S): at 13:30

## 2022-12-20 RX ADMIN — HEPARIN SODIUM 5000 UNIT(S): 5000 INJECTION INTRAVENOUS; SUBCUTANEOUS at 21:14

## 2022-12-20 RX ADMIN — LACOSAMIDE 120 MILLIGRAM(S): 50 TABLET ORAL at 05:26

## 2022-12-20 RX ADMIN — CHLORHEXIDINE GLUCONATE 1 APPLICATION(S): 213 SOLUTION TOPICAL at 13:29

## 2022-12-20 RX ADMIN — LACOSAMIDE 120 MILLIGRAM(S): 50 TABLET ORAL at 17:58

## 2022-12-20 RX ADMIN — LEVETIRACETAM 400 MILLIGRAM(S): 250 TABLET, FILM COATED ORAL at 17:58

## 2022-12-20 RX ADMIN — LEVETIRACETAM 400 MILLIGRAM(S): 250 TABLET, FILM COATED ORAL at 05:28

## 2022-12-20 NOTE — PHYSICAL THERAPY INITIAL EVALUATION ADULT - IMPAIRMENTS FOUND, PT EVAL
gait, locomotion, and balance/muscle strength/neuromotor development and sensory integration/posture/ROM

## 2022-12-20 NOTE — PROGRESS NOTE ADULT - ASSESSMENT
52-year-old male with a PMHx significant for TIAs (2011, 2013), CVAs x3 (02/2022 s/p tPA, 8/3/2022 s/p tPA, 11/5/2022 with residual expressive aphasia) on Eliquis, and HLD BIBEMS after being found unresponsive at home on the floor, last known well 12/1 at around noon. C-collar was placed. Patient was intubated in the ED for airway protection. CTH with no acute findings, vEEG with no identified seizures. Patient was weaned off pressors, extubated, and transitioned to floors 12/4/22. 12/5 am RRT called for seizure like activity with urinary incontinence, tongue biting, and R>L posturing, patient s/p ativan 2mg x3. Anesthesia called to RRT for intubation. MICU accepting patient for seizure like activity requiring intubation.      #AMS, Seizure like activity  EEG 12/7: concern for epileptiform activity; though EEG from 12/5 without such abnormalities.   - f/u neuro recs  - Patient found down and unresponsive at home on 12/2, last known well 12/1 at around noon. Intubated in ED 12/2, extubated in MICU 12/4  - RRT 12/5: seizure like acting with urinary incontinence, convulsions, and tongue biting; s/p ativan 2mg x3 with pauses in seizure activity following each; intubated for airway protection with rocuronium. Prolactin ~75   - CTH and CT cervical spine 12/2 negative for any acute pathologies. Tox screen on admission negative. 12/5: Stable exam from prior CT head, chronic microvascular ischemic changes, parenchymal loss, dystrophic calcification of central portion of alexis unchanged.  - Video EEG 12/3-12/4 without any seizure like activity, moderate to severe nonspecific diffuse or multifocal cerebral dysfunction  - c/w keppra 1500mg q12 maintenance, Keppra loaded 4.5g  - patient extubated again on 12/8th  -MRI noted, chronci CVA, no acute CVA noted         #CVAs/TIAs  - Hx of TIAs in 2011 and 2013, CVAs x3 in 02/22, 8/22, and 11/22 with residual expressive aphasia   - on Eliquis and Aspirin at home for hx of stroke  - PFO work up in past negative, no evidence of PFO on MIMI X2  - c/w ASA   - c/w heparin gtt  - Restart Statin when LFTs and CPK improve per neurology  - Being worked up for Mixed Connective Tissue Disease OP- f/u p-ANCA, c-ANCA/ [id number] negative  - Neuro recs appreciated    # Hypernatremia  speech and swallow, possible to start oral hydration   tredn Na level   renal eval appreciated  S/P hydraiton, trend Na level     #Thecal sac flattening   - CT thoracic and lumbar spine showing degenerative disc disease T6-T7 through L4-L5 and mild disc bulges at L2-L3 through L4-L5 that flatten the ventral thecal sac and narrowing of the bilateral neural foramina  - Will Monitor for now, will consider neuro/neurosurg evaluation in the future    #HLD  - atorvastatin on hold due to elevated CK and LFTs  - restart when possible for secondary stroke prevention    #?ASD/PFO  - Patient reportedly found to have a PFO in February 2022 during a stroke workup at [place]. Patient presented for a PFO closure in November 2022. Notably, no PFO was found at the time and no intervention was performed by Dr. Lynn.    - TTE 11/5/22 EF 57% and grossly normal LV function  - MIMI performed bedside 12/5 1 of 2 studies (+) on bubble study (uploaded to [facility])        #Rhabdomyolysis  - CK 8720 on admission, peaked at 15k, now downtrending  - DDx: prolonged downtime myositis v hyperthermia? (T 105.4 F) v sepsis v seizure induced        # Elevated Winston level  CHeck Abd US noted    GI eval PRN   Trend LFTs , normalized, no need for MRCP       #Partial SBO - resolved  - CT chest, abdomen, and pelvis w/ contrast concerning for possible partial SBO without transition point.  - Surgery consulted, no acute intervention at this time  - 12/5 KUB - negative for ileus or SBO  - Hold TF for 12/7 for possible trial of extubation.  - otherwise diet per nutrition      #Leukocytosis  - worsening leukocytosis  - monitor for fever  - Ortho eval fro knee swelling   - Pan Cx  - plan for LP by IR         #Concern for sepsis  Unclear source, aspiration v less likely meningitis   BCx (12/2 NGTD repeat 12/5 NGTD) and UC 12/2 NGTD  - Patient initially presented with AMS, T 105.4, tachycardic, and tachypneic   - UA negative  - s/p vanco and zosyn x1 in ED 12/2, ceftriaxone 2g BID 12/3-12/4, vanco 12/3-12/4  - 12/7 DCed Vanc since BCx from 12/5 NGTD  - c/w zosyn for aspiration pna presumed. completed course, monitor   - ID eval appreciated  - febrile again, Ortho for knee asp      # ANemia  noted, no gross bleeding  type and screen  GI eval

## 2022-12-20 NOTE — PROGRESS NOTE ADULT - SUBJECTIVE AND OBJECTIVE BOX
Subjective: Patient seen and examined. No new events except as noted.   Much more awake and alert   asking for PT and Discharge   no cp or sob     REVIEW OF SYSTEMS:    CONSTITUTIONAL:+ weakness, fevers or chills  EYES/ENT: No visual changes;  No vertigo or throat pain   NECK: No pain or stiffness  RESPIRATORY: No cough, wheezing, hemoptysis; No shortness of breath  CARDIOVASCULAR: No chest pain or palpitations  GASTROINTESTINAL: No abdominal or epigastric pain. No nausea, vomiting, or hematemesis; No diarrhea or constipation. No melena or hematochezia.  GENITOURINARY: No dysuria, frequency or hematuria  NEUROLOGICAL: No numbness or weakness  SKIN: No itching, burning, rashes, or lesions   All other review of systems is negative unless indicated above.    MEDICATIONS:  MEDICATIONS  (STANDING):  aspirin enteric coated 81 milliGRAM(s) Oral daily  bisacodyl Suppository 10 milliGRAM(s) Rectal daily  chlorhexidine 2% Cloths 1 Application(s) Topical daily  dextrose 50% Injectable 25 Gram(s) IV Push once  dextrose 50% Injectable 12.5 Gram(s) IV Push once  dextrose 50% Injectable 25 Gram(s) IV Push once  dextrose Oral Gel 15 Gram(s) Oral once  diphtheria/tetanus/pertussis (acellular) Vaccine (Adacel) 0.5 milliLiter(s) IntraMuscular once  glucagon  Injectable 1 milliGRAM(s) IntraMuscular once  heparin   Injectable 5000 Unit(s) SubCutaneous every 8 hours  lacosamide IVPB 100 milliGRAM(s) IV Intermittent every 12 hours  levETIRAcetam  IVPB 1500 milliGRAM(s) IV Intermittent every 12 hours      PHYSICAL EXAM:  T(C): 37 (12-20-22 @ 05:00), Max: 37 (12-20-22 @ 05:00)  HR: 94 (12-20-22 @ 05:00) (86 - 94)  BP: 140/89 (12-20-22 @ 05:00) (106/74 - 140/89)  RR: 18 (12-20-22 @ 05:00) (17 - 18)  SpO2: 98% (12-20-22 @ 05:00) (97% - 99%)  Wt(kg): --  I&O's Summary        Appearance: Normal	  HEENT:   Normal oral mucosa, PERRL, EOMI	  Lymphatic: No lymphadenopathy , no edema  Cardiovascular: Normal S1 S2, No JVD, No murmurs , Peripheral pulses palpable 2+ bilaterally  Respiratory: Lungs clear to auscultation, normal effort 	  Gastrointestinal:  Soft, Non-tender, + BS	  Skin: No rashes, No ecchymoses, No cyanosis, warm to touch  Musculoskeletal: Normal range of motion, normal strength  Psychiatry:  Mood & affect appropriate  Ext: No edema  Neuro: no focal deficits     LABS:    CARDIAC MARKERS:                                11.3   14.78 )-----------( 575      ( 20 Dec 2022 06:40 )             35.2     12-20    135  |  102  |  16  ----------------------------<  100<H>  4.1   |  23  |  0.71    Ca    8.5      20 Dec 2022 06:40  Phos  3.6     12-20  Mg     2.10     12-20    TPro  6.7  /  Alb  2.5<L>  /  TBili  1.0  /  DBili  x   /  AST  50<H>  /  ALT  93<H>  /  AlkPhos  183<H>  12-20    proBNP:   Lipid Profile:   HgA1c:   TSH:             TELEMETRY: 	    ECG:  	  RADIOLOGY:   DIAGNOSTIC TESTING:  [ ] Echocardiogram:  [ ]  Catheterization:  [ ] Stress Test:    OTHER:

## 2022-12-20 NOTE — PROGRESS NOTE ADULT - ASSESSMENT
52-year-old  M known to me from outpatient setting with  TIAs (2011, 2013), Strokes x3 (02/2022 s/p tPA, 8/3/2022 s/p tPA, 11/5/2022 with residual expressive aphasia) on Eliquis, was on testosterone outpatient stopped after last stroke, HLD BIBEMS after being found unresponsive  Temp 105.4 on arrival tachy and /110. 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   CT cervical spine and maxillofacial scans negative.  CT chest, abdomen, and pelvis w/ contrast concerning for possible partial SBO without transition point. Surgery consulted in ED, no acute surgical intervention at this time.   CT thoracic and lumbar spine showing degenerative disc disease T6-T7 through L4-L5 and mild disc bulges at L2-L3 through L4-L5 that flatten the ventral thecal sac and narrowing of the bilateral neural foramina.  MRI 11/2022: R centrum semiovale and R posterior frontal infarcts   takes adderall at home   + rhabdo  EEG no seizures   + transaminitis   CTH 12/5 stable   outpatient hypercoag workup neg   12/5 extubated then reintibuated for seizure activity and tx back to ICU   EEG this AM 12/5 with only slowing   EEG 12/6 slowing but no seizures   spoke with Pippa Conti (friend) who states after he was taken off the testosterone he ordered a mail order testosterone supplement, unclear if he started it yet, unclear what this was  12/7 EEG no electrogtraphic seizures captured but R LPDs, rare L frontal discharges, severe GRDA.   12/8 EEG improved.  extubated. following some commands   12/9 moving uppers> lowers   12/11 AAOx2 uppers 4-5/5; not moving LLE well   MRI brain neg for new stroke   s/p L knee tap arthrocentesis  by ortho on 12/12   spoke with friend pippa conti - she counted his adderrall and didn't take extra tables. did find ashwagandha and red wood (bottle was sealed) supplement in his apartment   s/p LP 12/5 --> CSF glucose and protein WNL.  will f/u remaining studies. non infectious appearing   o/e AAOx2, slurred but 2/2 tongue bite   + transaminitis   more alert  12/20 neuro exam improving AAOx2-3     Impression:   1) AMS of unclear etiology, possible now seizure, related to adderral withdrawal? 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 had MIMI was question of PFO but not found. s/p ILR      - resume AC s/p LP when able given L kene   - possible MRCP planning for Monday. GI f/u.  elevated LFTs --> LFTs improved--> downtrending . MRCP deferred   - ortho for L knee s/p tap / arthrocentesis   -  Vimpat 100mg BID    - keppra 1500mg BID.   - fever on arrival, treatred initially for meningitis.  was on abx for aspiration PNA.  >LP done--> non  infectious   - IVF  - CPK elevated. trend improving   - would consider endocrine   - there was some concern outpatient he may have a mixed  connective tissue disorder   - statin therapy for secondary stroke prevention when LFTS and CPK improve   - telemetry  - PT/OT/SS/SLP, OOBC  - check FS, glucose control <180  - GI/DVT ppx  - Thank you for allowing me to participate in the care of this patient. Call with questions.   - spoke with friend at bedside, [place] 12/11   - spoke with Pippa Conti at [phone] for more collateral info and to provide update. spoke again 12/12 over phone. spoke 12/19   spoke with parents at bedside 12/20   Braxton Forde MD  Vascular Neurology  Office: [phone]

## 2022-12-20 NOTE — PHYSICAL THERAPY INITIAL EVALUATION ADULT - THERAPY FREQUENCY, PT EVAL
Outpatient Medications Marked as Taking for the 2/22/21 encounter (Refill) with Simone Walsh MD   Medication Sig Dispense Refill   • levothyroxine 88 MCG tablet Take 1 tablet by mouth daily. 90 tablet 1        Ok to leave detailed Message: Yes  Informed caller of refill policy- 24-48 hours: Yes  No call back needed unless nurse has questions.     Pharmacy: [facility]  
levothyroxine 88 MCG tablet 90 tablet 1 8/21/2020     Sig - Route: Take 1 tablet by mouth daily. - Oral      LOV: 10/13/2020  NOV: 7/14/21    Refilled per protocol.     
3-5x/week

## 2022-12-20 NOTE — PHYSICAL THERAPY INITIAL EVALUATION ADULT - MANUAL MUSCLE TESTING RESULTS, REHAB EVAL
at least 3/5 throughout except left ankle dorsiflexion at least 2/5 , Left LE hip and knee 3- to 3/5 , MARK Sotelo made aware

## 2022-12-20 NOTE — PROGRESS NOTE ADULT - SUBJECTIVE AND OBJECTIVE BOX
[facility]   MD SIVA CLAUDIO MD KRISTINE SOLTANPOUR, DO ANGELA WONG, PA    TEL:  OFFICE: [phone]  From 5pm-7am Answering Service [phone]    -- RENAL FOLLOW UP NOTE ---Date of Service 12-20-22 @ 09:57    Patient is a 52y old  Male who presents with a chief complaint of Unresponsive (19 Dec 2022 18:03)      Patient seen and examined at bedside. No chest pain/sob    VITALS:  T(F): 98.6 (12-20-22 @ 05:00), Max: 98.6 (12-20-22 @ 05:00)  HR: 94 (12-20-22 @ 05:00)  BP: 140/89 (12-20-22 @ 05:00)  RR: 18 (12-20-22 @ 05:00)  SpO2: 98% (12-20-22 @ 05:00)  Wt(kg): --        PHYSICAL EXAM:  General: NAD  Neck: No JVD  Respiratory: CTAB, no wheezes, rales or rhonchi  Cardiovascular: S1, S2, RRR  Gastrointestinal: BS+, soft, NT/ND  Extremities: No peripheral edema    Hospital Medications:   MEDICATIONS  (STANDING):  aspirin enteric coated 81 milliGRAM(s) Oral daily  bisacodyl Suppository 10 milliGRAM(s) Rectal daily  chlorhexidine 2% Cloths 1 Application(s) Topical daily  dextrose 50% Injectable 25 Gram(s) IV Push once  dextrose 50% Injectable 12.5 Gram(s) IV Push once  dextrose 50% Injectable 25 Gram(s) IV Push once  dextrose Oral Gel 15 Gram(s) Oral once  diphtheria/tetanus/pertussis (acellular) Vaccine (Adacel) 0.5 milliLiter(s) IntraMuscular once  glucagon  Injectable 1 milliGRAM(s) IntraMuscular once  heparin   Injectable 5000 Unit(s) SubCutaneous every 8 hours  lacosamide IVPB 100 milliGRAM(s) IV Intermittent every 12 hours  levETIRAcetam  IVPB 1500 milliGRAM(s) IV Intermittent every 12 hours      LABS:  12-20    135  |  102  |  16  ----------------------------<  100<H>  4.1   |  23  |  0.71    Ca    8.5      20 Dec 2022 06:40  Phos  3.6     12-20  Mg     2.10     12-20    TPro  6.7  /  Alb  2.5<L>  /  TBili  1.0  /  DBili      /  AST  50<H>  /  ALT  93<H>  /  AlkPhos  183<H>  12-20    Creatinine Trend: 0.71 <--, 0.73 <--, 0.76 <--, 0.65 <--, 0.66 <--, 0.69 <--, 0.73 <--, 0.70 <--, 0.77 <--    Albumin, Serum: 2.5 g/dL (12-20 @ 06:40)  Phosphorus Level, Serum: 3.6 mg/dL (12-20 @ 06:40)                              11.3   14.78 )-----------( 575      ( 20 Dec 2022 06:40 )             35.2     Urine Studies:  Urinalysis - [12-05-22 @ 21:00]      Color Yellow / Appearance Clear / SG 1.048 / pH 7.5      Gluc Negative / Ketone Moderate  / Bili Negative / Urobili <2 mg/dL       Blood Negative / Protein 100 mg/dL / Leuk Est Negative / Nitrite Negative      RBC 0 / WBC 1 / Hyaline  / Gran  / Sq Epi  / Non Sq Epi  / Bacteria     Urine Sodium 60      [12-19-22 @ 15:00]  Urine Osmolality 389      [12-19-22 @ 15:00]    Iron 49, TIBC 142, %sat 35      [12-14-22 @ 04:30]  Ferritin 692      [12-14-22 @ 04:30]  PTH -- (Ca --)      [12-18-22 @ 06:46]   27  Vitamin D (25OH) 44.1      [12-18-22 @ 06:46]  TSH 1.30      [12-05-22 @ 02:30]  Lipid: chol 131, , HDL 34, LDL --      [11-05-22 @ 06:57]        RADIOLOGY & ADDITIONAL STUDIES:

## 2022-12-20 NOTE — PROGRESS NOTE ADULT - SUBJECTIVE AND OBJECTIVE BOX
Neurology Progress Note    S: Patient seen and examined , doing okay. parents at bedside . better     Medication:  MEDICATIONS  (STANDING):  aspirin enteric coated 81 milliGRAM(s) Oral daily  bisacodyl Suppository 10 milliGRAM(s) Rectal daily  chlorhexidine 2% Cloths 1 Application(s) Topical daily  dextrose 50% Injectable 25 Gram(s) IV Push once  dextrose 50% Injectable 12.5 Gram(s) IV Push once  dextrose 50% Injectable 25 Gram(s) IV Push once  dextrose Oral Gel 15 Gram(s) Oral once  diphtheria/tetanus/pertussis (acellular) Vaccine (Adacel) 0.5 milliLiter(s) IntraMuscular once  glucagon  Injectable 1 milliGRAM(s) IntraMuscular once  heparin   Injectable 5000 Unit(s) SubCutaneous every 8 hours  lacosamide IVPB 100 milliGRAM(s) IV Intermittent every 12 hours  levETIRAcetam  IVPB 1500 milliGRAM(s) IV Intermittent every 12 hours  lidocaine   4% Patch 1 Patch Transdermal every 24 hours    MEDICATIONS  (PRN):  ibuprofen  Tablet. 400 milliGRAM(s) Oral every 6 hours PRN Mild Pain (1 - 3), Moderate Pain (4 - 6)  oxyCODONE    IR 5 milliGRAM(s) Oral every 6 hours PRN moderate and severe pain        Vitals:    Vital Signs Last 24 Hrs  T(C): 37.3 (12-20-22 @ 13:15), Max: 37.3 (12-20-22 @ 13:15)  T(F): 99.2 (12-20-22 @ 13:15), Max: 99.2 (12-20-22 @ 13:15)  HR: 105 (12-20-22 @ 13:15) (86 - 105)  BP: 120/71 (12-20-22 @ 13:15) (120/71 - 140/89)  BP(mean): --  RR: 16 (12-20-22 @ 13:15) (16 - 18)  SpO2: 98% (12-20-22 @ 13:15) (98% - 99%)                     General Exam:   General Appearance: Appropriately dressed and in no acute distress       Head: Normocephalic, atraumatic and no dysmorphic features  Ear, Nose, and Throat: Moist mucous membranes    CVS: S1S2+  Resp: No SOB, no wheeze or rhonchi  Abd: soft NTND  Extremities: No edema, no cyanosis  Skin: No bruises, no rashes     Neurological Exam:    Mental Status: Awake, Oriented x 2-3, able to follow simple and complex verbal commands. answers questions appropriately, able to name, repeat and recall. no aphasia, mild dysarthria (tongue bite)   Cranial Nerves: EOMI, PERRLA, VFF, V1-V3 sensation intact, no facial asymmetry, tongue midline, hearing intact B/L, shoulder shrug appropriate, SCM/trap intact.   Motor: Moving uppers > lowers. uppers at least 4+-5/5 ; not moving LLE very well 2/2 knee pain otherwise intact    Sensation: intact to LT and PP   Reflexes: 1+ throughout at biceps, brachioradialis, triceps, patellars and ankles bilaterally and equal. No clonus. R toe and L toe were both downgoing.  Coordination: no dysmetria   Gait: unable     I personally reviewed the below data/images/labs:  CBC Full  -  ( 20 Dec 2022 06:40 )  WBC Count : 14.78 K/uL  RBC Count : 3.78 M/uL  Hemoglobin : 11.3 g/dL  Hematocrit : 35.2 %  Platelet Count - Automated : 575 K/uL  Mean Cell Volume : 93.1 fL  Mean Cell Hemoglobin : 29.9 pg  Mean Cell Hemoglobin Concentration : 32.1 gm/dL  Auto Neutrophil # : x  Auto Lymphocyte # : x  Auto Monocyte # : x  Auto Eosinophil # : x  Auto Basophil # : x  Auto Neutrophil % : x  Auto Lymphocyte % : x  Auto Monocyte % : x  Auto Eosinophil % : x  Auto Basophil % : x    12-20    135  |  102  |  16  ----------------------------<  100<H>  4.1   |  23  |  0.71    Ca    8.5      20 Dec 2022 06:40  Phos  3.6     12-20  Mg     2.10     12-20    TPro  6.7  /  Alb  2.5<L>  /  TBili  1.0  /  DBili  x   /  AST  50<H>  /  ALT  93<H>  /  AlkPhos  183<H>  12-20    < from: CT Head No Cont (12.02.22 @ 20:27) >    ACC: [id number] EXAM:  CT CERVICAL SPINE                        ACC: [id number] EXAM:  CT MAXILLOFACIAL                        ACC: [id number] EXAM:  CT BRAIN                          PROCEDURE DATE:  12/02/2022        INTERPRETATION:  CLINICAL INDICATION: Trauma.    Technique: Noncontrast axial CT of the head, facial bones, and cervical   spine was performed. 3-D reformats of the facial bones was obtained.   Coronal and sagittal reformats were obtained.      COMPARISON: None.    FINDINGS:  Head CT:  The ventricles and sulci are within normal limits. 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 There is no intraparenchymal hematoma, mass effect   or midline shift. No abnormal extra-axial fluid collections or   hemorrhages are present.    There is swelling of the left lateral scalp. The calvarium is intact.   There are scattered mucosal inflammatory changes in the paranasal sinuses.      Cervical spine CT:  Alignment ismaintained. Vertebral bodies are normal in height, without   evidence of fracture or dislocation. Prevertebral soft tissues are within   normal limits without soft tissue swelling or hematoma.    Intervertebral discs are intact. Neural foramina and spinal canal are   intact.    The visualized lung apices are within normal limits.      Facial bone CT:    No acute facial fracture.    There are scattered mucosal inflammatory changes in the paranasal   sinuses. Mastoid air cells are clear.    Soft tissues appear unremarkable.        IMPRESSION:  CT HEAD: No acute abnormality.  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  CT CERVICAL SPINE: No acute abnormality  CT FACIAL BONE: No acute abnormality    --- End of Report ---       DELGADO IVAN MD; Attending Radiologist  This document has been electronically signed. Dec  2 2022  9:02PM    < end of copied text >  < from: CT Lumbar Spine No Cont (12.02.22 @ 20:27) >    ACC: [id number] EXAM:  CT LUMBAR SPINE                        ACC: [id number] EXAM:  CT THORACIC SPINE                          PROCEDURE DATE:  12/02/2022          INTERPRETATION:  CT thoracic and lumbar spine without IV contrast    CLINICAL INFORMATION:  Trauma  Back pain, fracture.    TECHNIQUE:  Contiguous axial 2 mm sections were obtained through the   thoracic and lumbar spine using a single helical acquisition.     Additional 2 mm sagittal and coronal reconstructions of the spine were   obtained. These additional reformatted images were used to evaluate the   spine for alignment, vertebral fractures and the integrity of the the   posterior elements.   This scan was performed using automatic exposure   control (radiation dose reduction software) to obtain a diagnostic image   quality scan with patient dose as low as reasonably achievable.    FINDINGS:   No prior similar studies are available for review    Thoracic and lumbar vertebral body heights are maintained. No vertebral   fracture is seen. No destructive bone lesion is found.  Alignment is   preserved.  Facet joints appear intact and aligned.    Thoracic and lumbar intervertebral disc spaces appear intact.   Degenerative disc disease and spondylosis is noted at T6-T7 throughL4-5   with loss of disc height and associated degenerative endplate changes.   Mild disc bulges at L2-3 through L4-5 flatten the ventral thecal sac and   narrow the BILATERAL neural foramina.    No paraspinal mass is recognized.  Paraspinal soft tissues appear intact.      IMPRESSION:  Degenerative disc disease and spondylosis is noted at T6-T7   through L4-5 with loss of disc height and associated degenerative   endplate changes. Mild disc bulges at L2-3 through L4-5 flatten the   ventral thecal sac and narrow the BILATERAL neural foramina   No   vertebral fracture is recognized.    --- End of Report ---       ANGIE ESCOBAR MD; Attending Radiologist  This document has been electronically signed. Dec  2 2022  8:55PM    < end of copied text >    EEG Classification / Summary:  Abnormal EEG in a comatose patient due to diffuse suppression gradually improving to discontinuous background, with right hemispheric relative attenuation/suppression. No epileptiform abnormalities or seizures are captured.    Clinical Impression:  Severe diffuse cerebral dysfunction that gradually improves is nonspecific in etiology. In this case, it may be related to sedating medication (propofol) with improvement after decreasing and stopping the medication.  Right hemispheric focal cerebral dysfunction can be structural or functional in etiology.      12/7  Clinical Impression:  -Occasional runs of right frontal lateralized periodic discharges (LPDs) at up to 1.3 Hz indicate a potentially epileptogenic focus in the right frontal region and are on the ictal-interictal continuum.  -A pattern on the ictal-interictal continuum is a pattern that does not qualify as an electrographic seizure or electrographic status epilepticus, but there is a reasonable chance that it may be contributing to impaired alertness, causing other clinical symptoms, and/or contributing to neuronal injury.  -Right hemispheric relative attenuation indicates right hemispheric focal cerebral dysfunction, which can be structural or functional in etiology.  -Rare left frontal epileptiform discharges indicate a potentially epileptogenic focus in this reigon  -Severe diffuse slowing and GRDA indicate severe diffuse cerebral dysfunction of nonspecific etiology.  -No electrographic seizures are captured.     < from: MR Head w/wo IV Cont (12.09.22 @ 19:54) >    ACC: [id number] EXAM:  MR BRAIN WAW IC                          PROCEDURE DATE:  12/09/2022          INTERPRETATION:  CLINICAL INDICATION: CVA, found unresponsive at home      Magnetic resonance imaging of the brain was carried out with transaxial   SPGR, FLAIR, fast spin echo T2 weighted images, axial susceptibility   weighted series, diffusion weighted series and sagittal T1 weighted   series on a 1.5 Maritza magnet. Post contrast axial, coronal and sagittal   T1 weighted images were obtained. 10cc of Gadavist were intravenously   injected, 0 cc were discarded.      Comparison is made with the prior brain CT of 12/5/2022 and MRI 11/5/2022.    Mild ventricular and sulcal prominence is consistent with moderate   atrophy for the patient's age. No acute hemorrhage is identified. There   is a focus of hemosiderin within the alexis which is calcified on CT.   Scattered additional foci of hemosiderin deposition are identified in the   left frontal subcortical white matter and right occipital cortex is   nonspecific but may be related to multiple cavernoma is or hypertension.    There is a tiny focus of diffusion restriction in the right frontal   subcortical white matter and right centrum semiovale which are unchanged   since the prior exam and may represent subacute infarcts. Multiplicity   infarcts may be related to hypercoagulable state or cardioembolic events.    After contrast administration there is normal intracranial vascular   enhancement. No abnormal parenchymal or leptomeningeal enhancement.      IMPRESSION: Atrophy for the patient's age. Right frontal subcortical and   right centrum semiovale punctate infarcts are unchanged since 11/5/2022   and likely represent subacute infarcts. No abnormal enhancement. Focus of   calcification in the alexis with old hemosiderin. A few additional   scattered foci of hemosiderin deposition are unchanged.    --- End of Report ---            JUAN MANUEL CASTILLO MD; Attending Radiologist  This document has been electronically signed. Dec  9 2022  8:05PM    < end of copied text >

## 2022-12-20 NOTE — PHYSICAL THERAPY INITIAL EVALUATION ADULT - LEVEL OF INDEPENDENCE: GAIT, REHAB EVAL
pt stood for ~ 20 seconds, unable to take a step requested to sit down 2/2 c/o left knee pain - pt with kyphotic posture, flexed/forward head, needs cueing to correct and stand upright

## 2022-12-20 NOTE — PHYSICAL THERAPY INITIAL EVALUATION ADULT - PERTINENT HX OF CURRENT PROBLEM, REHAB EVAL
51 y/o Male PMHx TIAs, CVAs x3 s/p tPA, CVA 11/5/2022 with residual expressive aphasia) on Eliquis, and HLD BIBEMS after being found unresponsive at home on the floor, Pt was intubated in the ED for airway protection. CTH neg, vEEG no seizures. Pt was weaned off pressors, extubated, and transitioned to floors 12/4. 12/5 am RRT called for seizure like activity w/ urinary incontinence, tongue biting, and R>L posturing, s/p ativan 2mg x3. intubated for airway protection now extubated 12/8. S/p LP 12/15  Couse c/b partial SBO on CT imaging, fevers, anemia, and hyperbilirubinemia.

## 2022-12-20 NOTE — PROGRESS NOTE ADULT - SUBJECTIVE AND OBJECTIVE BOX
Name of Patient : TAMI DUNHAM  MRN: [id number]  Date of visit: 12-20-22 @ 17:54      Subjective: Patient seen and examined. No new events except as noted.   Doing okay   LE knee pain     REVIEW OF SYSTEMS:    CONSTITUTIONAL: No weakness, fevers or chills  EYES/ENT: No visual changes;  No vertigo or throat pain   NECK: No pain or stiffness  RESPIRATORY: No cough, wheezing, hemoptysis; No shortness of breath  CARDIOVASCULAR: No chest pain or palpitations  GASTROINTESTINAL: No abdominal or epigastric pain. No nausea, vomiting, or hematemesis; No diarrhea or constipation. No melena or hematochezia.  GENITOURINARY: No dysuria, frequency or hematuria  NEUROLOGICAL: No numbness or weakness  SKIN: No itching, burning, rashes, or lesions   All other review of systems is negative unless indicated above.    MEDICATIONS:  MEDICATIONS  (STANDING):  aspirin enteric coated 81 milliGRAM(s) Oral daily  bisacodyl Suppository 10 milliGRAM(s) Rectal daily  chlorhexidine 2% Cloths 1 Application(s) Topical daily  dextrose 50% Injectable 25 Gram(s) IV Push once  dextrose 50% Injectable 12.5 Gram(s) IV Push once  dextrose 50% Injectable 25 Gram(s) IV Push once  dextrose Oral Gel 15 Gram(s) Oral once  diphtheria/tetanus/pertussis (acellular) Vaccine (Adacel) 0.5 milliLiter(s) IntraMuscular once  glucagon  Injectable 1 milliGRAM(s) IntraMuscular once  heparin   Injectable 5000 Unit(s) SubCutaneous every 8 hours  lacosamide IVPB 100 milliGRAM(s) IV Intermittent every 12 hours  levETIRAcetam  IVPB 1500 milliGRAM(s) IV Intermittent every 12 hours  lidocaine   4% Patch 1 Patch Transdermal every 24 hours      PHYSICAL EXAM:  T(C): 37.3 (12-20-22 @ 13:15), Max: 37.3 (12-20-22 @ 13:15)  HR: 105 (12-20-22 @ 13:15) (86 - 105)  BP: 120/71 (12-20-22 @ 13:15) (120/71 - 140/89)  RR: 16 (12-20-22 @ 13:15) (16 - 18)  SpO2: 98% (12-20-22 @ 13:15) (98% - 99%)  Wt(kg): --  I&O's Summary        Appearance: Normal	  HEENT:  PERRLA   Lymphatic: No lymphadenopathy   Cardiovascular: Normal S1 S2, no JVD  Respiratory: normal effort , clear  Gastrointestinal:  Soft, Non-tender  Skin: No rashes,  warm to touch  Psychiatry:  Mood & affect appropriate  Musculuskeletal: LE dressing     All labs, Imaging and EKGs personally reviewed                           11.3   14.78 )-----------( 575      ( 20 Dec 2022 06:40 )             35.2               12-20    135  |  102  |  16  ----------------------------<  100<H>  4.1   |  23  |  0.71    Ca    8.5      20 Dec 2022 06:40  Phos  3.6     12-20  Mg     2.10     12-20    TPro  6.7  /  Alb  2.5<L>  /  TBili  1.0  /  DBili  x   /  AST  50<H>  /  ALT  93<H>  /  AlkPhos  183<H>  12-20

## 2022-12-20 NOTE — PROGRESS NOTE ADULT - ASSESSMENT
52-year-old male with a PMHx significant for TIAs (2011, 2013), CVAs x3 (02/2022 s/p tPA, 8/3/2022 s/p tPA, 11/5/2022 with residual expressive aphasia) on Eliquis, and HLD BIBEMS after being found unresponsive at home.nephrology consulted for hypernatremia    Hypernatremia  Secondary to dehydration  Hypernatremia resolved.   improved  monitor closely  avoid overcorrection    hyponatremia  work up suggested SIADH  free water restriction <1.2L/day  better  monitor    hypocalcemia  low alb  stable  Vit d 25 optimal  monitor

## 2022-12-21 LAB
ALBUMIN SERPL ELPH-MCNC: 2.7 G/DL — LOW (ref 3.3–5)
ALP SERPL-CCNC: 174 U/L — HIGH (ref 40–120)
ALT FLD-CCNC: 75 U/L — HIGH (ref 4–41)
AMPA-R AB CBA, CSF: NEGATIVE — SIGNIFICANT CHANGE UP
AMPHIPHYSIN AB TITR CSF: NEGATIVE TITER — SIGNIFICANT CHANGE UP
ANION GAP SERPL CALC-SCNC: 10 MMOL/L — SIGNIFICANT CHANGE UP (ref 7–14)
AST SERPL-CCNC: 41 U/L — HIGH (ref 4–40)
BILIRUB DIRECT SERPL-MCNC: 0.3 MG/DL — SIGNIFICANT CHANGE UP (ref 0–0.3)
BILIRUB INDIRECT FLD-MCNC: 0.5 MG/DL — SIGNIFICANT CHANGE UP (ref 0–1)
BILIRUB SERPL-MCNC: 0.8 MG/DL — SIGNIFICANT CHANGE UP (ref 0.2–1.2)
BUN SERPL-MCNC: 16 MG/DL — SIGNIFICANT CHANGE UP (ref 7–23)
CALCIUM SERPL-MCNC: 8.6 MG/DL — SIGNIFICANT CHANGE UP (ref 8.4–10.5)
CASPR2-IGG CBA, CSF: NEGATIVE — SIGNIFICANT CHANGE UP
CHLORIDE SERPL-SCNC: 102 MMOL/L — SIGNIFICANT CHANGE UP (ref 98–107)
CO2 SERPL-SCNC: 22 MMOL/L — SIGNIFICANT CHANGE UP (ref 22–31)
CREAT SERPL-MCNC: 0.83 MG/DL — SIGNIFICANT CHANGE UP (ref 0.5–1.3)
CV2 IGG TITR CSF: NEGATIVE TITER — SIGNIFICANT CHANGE UP
DPPX ANTIBODY IFA, CSF: NEGATIVE — SIGNIFICANT CHANGE UP
EGFR: 105 ML/MIN/1.73M2 — SIGNIFICANT CHANGE UP
GABA-B-R AB CBA, CSF: NEGATIVE — SIGNIFICANT CHANGE UP
GAD65 AB CSF-SCNC: 0 NMOL/L — SIGNIFICANT CHANGE UP
GFAP IFA, CSF: NEGATIVE — SIGNIFICANT CHANGE UP
GLIAL NUC TYPE 1 AB TITR CSF: NEGATIVE TITER — SIGNIFICANT CHANGE UP
GLUCOSE BLDC GLUCOMTR-MCNC: 101 MG/DL — HIGH (ref 70–99)
GLUCOSE BLDC GLUCOMTR-MCNC: 110 MG/DL — HIGH (ref 70–99)
GLUCOSE SERPL-MCNC: 131 MG/DL — HIGH (ref 70–99)
HCT VFR BLD CALC: 34 % — LOW (ref 39–50)
HGB BLD-MCNC: 11 G/DL — LOW (ref 13–17)
HU1 AB TITR CSF IF: NEGATIVE TITER — SIGNIFICANT CHANGE UP
HU2 AB TITR CSF IF: NEGATIVE TITER — SIGNIFICANT CHANGE UP
HU3 AB TITR CSF: NEGATIVE TITER — SIGNIFICANT CHANGE UP
IGLON5 IFA, CSF: NEGATIVE — SIGNIFICANT CHANGE UP
IMMUNOLOGIST REVIEW: SIGNIFICANT CHANGE UP
LGI1-IGG CBA, CSF: NEGATIVE — SIGNIFICANT CHANGE UP
MAGNESIUM SERPL-MCNC: 2 MG/DL — SIGNIFICANT CHANGE UP (ref 1.6–2.6)
MCHC RBC-ENTMCNC: 29.9 PG — SIGNIFICANT CHANGE UP (ref 27–34)
MCHC RBC-ENTMCNC: 32.4 GM/DL — SIGNIFICANT CHANGE UP (ref 32–36)
MCV RBC AUTO: 92.4 FL — SIGNIFICANT CHANGE UP (ref 80–100)
MGLUR1 AB IFA, CSF: NEGATIVE — SIGNIFICANT CHANGE UP
NIF IFA, CSF: NEGATIVE — SIGNIFICANT CHANGE UP
NMDA-R AB CBA, CSF: NEGATIVE — SIGNIFICANT CHANGE UP
NRBC # BLD: 0 /100 WBCS — SIGNIFICANT CHANGE UP (ref 0–0)
NRBC # FLD: 0 K/UL — SIGNIFICANT CHANGE UP (ref 0–0)
PCA-TR AB TITR CSF: NEGATIVE TITER — SIGNIFICANT CHANGE UP
PHOSPHATE SERPL-MCNC: 3.9 MG/DL — SIGNIFICANT CHANGE UP (ref 2.5–4.5)
PLATELET # BLD AUTO: 562 K/UL — HIGH (ref 150–400)
POTASSIUM SERPL-MCNC: 4 MMOL/L — SIGNIFICANT CHANGE UP (ref 3.5–5.3)
POTASSIUM SERPL-SCNC: 4 MMOL/L — SIGNIFICANT CHANGE UP (ref 3.5–5.3)
PROT SERPL-MCNC: 6.9 G/DL — SIGNIFICANT CHANGE UP (ref 6–8.3)
PURKINJE CELL CYTOPLASMIC AB TYPE 2: NEGATIVE TITER — SIGNIFICANT CHANGE UP
PURKINJE CELLS AB TITR CSF IF: NEGATIVE TITER — SIGNIFICANT CHANGE UP
RBC # BLD: 3.68 M/UL — LOW (ref 4.2–5.8)
RBC # FLD: 15.2 % — HIGH (ref 10.3–14.5)
REFLEX ADDED: SIGNIFICANT CHANGE UP
SODIUM SERPL-SCNC: 134 MMOL/L — LOW (ref 135–145)
WBC # BLD: 11.71 K/UL — HIGH (ref 3.8–10.5)
WBC # FLD AUTO: 11.71 K/UL — HIGH (ref 3.8–10.5)

## 2022-12-21 RX ORDER — APIXABAN 2.5 MG/1
5 TABLET, FILM COATED ORAL
Refills: 0 | Status: DISCONTINUED | OUTPATIENT
Start: 2022-12-21 | End: 2022-12-31

## 2022-12-21 RX ADMIN — LEVETIRACETAM 400 MILLIGRAM(S): 250 TABLET, FILM COATED ORAL at 17:33

## 2022-12-21 RX ADMIN — LEVETIRACETAM 400 MILLIGRAM(S): 250 TABLET, FILM COATED ORAL at 05:54

## 2022-12-21 RX ADMIN — Medication 10 MILLIGRAM(S): at 11:51

## 2022-12-21 RX ADMIN — OXYCODONE HYDROCHLORIDE 5 MILLIGRAM(S): 5 TABLET ORAL at 09:47

## 2022-12-21 RX ADMIN — Medication 1 TABLET(S): at 17:52

## 2022-12-21 RX ADMIN — HEPARIN SODIUM 5000 UNIT(S): 5000 INJECTION INTRAVENOUS; SUBCUTANEOUS at 05:54

## 2022-12-21 RX ADMIN — APIXABAN 5 MILLIGRAM(S): 2.5 TABLET, FILM COATED ORAL at 17:53

## 2022-12-21 RX ADMIN — APIXABAN 5 MILLIGRAM(S): 2.5 TABLET, FILM COATED ORAL at 08:47

## 2022-12-21 RX ADMIN — LIDOCAINE 1 PATCH: 4 CREAM TOPICAL at 06:35

## 2022-12-21 RX ADMIN — LIDOCAINE 1 PATCH: 4 CREAM TOPICAL at 19:52

## 2022-12-21 RX ADMIN — LACOSAMIDE 120 MILLIGRAM(S): 50 TABLET ORAL at 05:54

## 2022-12-21 RX ADMIN — OXYCODONE HYDROCHLORIDE 5 MILLIGRAM(S): 5 TABLET ORAL at 17:53

## 2022-12-21 RX ADMIN — LIDOCAINE 1 PATCH: 4 CREAM TOPICAL at 17:31

## 2022-12-21 RX ADMIN — LACOSAMIDE 120 MILLIGRAM(S): 50 TABLET ORAL at 17:52

## 2022-12-21 RX ADMIN — OXYCODONE HYDROCHLORIDE 5 MILLIGRAM(S): 5 TABLET ORAL at 08:47

## 2022-12-21 RX ADMIN — CHLORHEXIDINE GLUCONATE 1 APPLICATION(S): 213 SOLUTION TOPICAL at 11:51

## 2022-12-21 RX ADMIN — Medication 81 MILLIGRAM(S): at 11:52

## 2022-12-21 RX ADMIN — OXYCODONE HYDROCHLORIDE 5 MILLIGRAM(S): 5 TABLET ORAL at 18:47

## 2022-12-21 NOTE — PROGRESS NOTE ADULT - ASSESSMENT
52-year-old male with a PMHx significant for TIAs (2011, 2013), CVAs x3 (02/2022 s/p tPA, 8/3/2022 s/p tPA, 11/5/2022 with residual expressive aphasia) on Eliquis, and HLD BIBEMS after being found unresponsive at home on the floor, last known well 12/1 at around noon. C-collar was placed. Patient was intubated in the ED for airway protection. CTH with no acute findings, vEEG with no identified seizures. Patient was weaned off pressors, extubated, and transitioned to floors 12/4/22. 12/5 am RRT called for seizure like activity with urinary incontinence, tongue biting, and R>L posturing, patient s/p ativan 2mg x3. Anesthesia called to RRT for intubation. MICU accepting patient for seizure like activity requiring intubation.      #AMS, Seizure like activity  EEG 12/7: concern for epileptiform activity; though EEG from 12/5 without such abnormalities.   - f/u neuro recs  - Patient found down and unresponsive at home on 12/2, last known well 12/1 at around noon. Intubated in ED 12/2, extubated in MICU 12/4  - RRT 12/5: seizure like acting with urinary incontinence, convulsions, and tongue biting; s/p ativan 2mg x3 with pauses in seizure activity following each; intubated for airway protection with rocuronium. Prolactin ~75   - CTH and CT cervical spine 12/2 negative for any acute pathologies. Tox screen on admission negative. 12/5: Stable exam from prior CT head, chronic microvascular ischemic changes, parenchymal loss, dystrophic calcification of central portion of alexis unchanged.  - Video EEG 12/3-12/4 without any seizure like activity, moderate to severe nonspecific diffuse or multifocal cerebral dysfunction  - c/w keppra 1500mg q12 maintenance, Keppra loaded 4.5g  - patient extubated again on 12/8th  -MRI noted, chronci CVA, no acute CVA noted         #CVAs/TIAs  - Hx of TIAs in 2011 and 2013, CVAs x3 in 02/22, 8/22, and 11/22 with residual expressive aphasia   - on Eliquis and Aspirin at home for hx of stroke  - PFO work up in past negative, no evidence of PFO on MIMI X2  - c/w ASA   - c/w heparin gtt, was on hold, resume eliquis   - Restart Statin when LFTs and CPK improve per neurology  - Being worked up for Mixed Connective Tissue Disease OP- f/u p-ANCA, c-ANCA/ [id number] negative  - Neuro recs appreciated    # Hypernatremia  tredn Na level   renal eval appreciated  S/P hydraiton, trend Na level     #Thecal sac flattening   - CT thoracic and lumbar spine showing degenerative disc disease T6-T7 through L4-L5 and mild disc bulges at L2-L3 through L4-L5 that flatten the ventral thecal sac and narrowing of the bilateral neural foramina  - Will Monitor for now, will consider neuro/neurosurg evaluation in the future    #HLD  - atorvastatin on hold due to elevated CK and LFTs  - restart when possible for secondary stroke prevention    #?ASD/PFO  - Patient reportedly found to have a PFO in February 2022 during a stroke workup at [facility]. Patient presented for a PFO closure in November 2022. Notably, no PFO was found at the time and no intervention was performed by Dr. Lynn.    - TTE 11/5/22 EF 57% and grossly normal LV function  - MIMI performed bedside 12/5 1 of 2 studies (+) on bubble study (uploaded to [facility])        #Rhabdomyolysis  - CK 8720 on admission, peaked at 15k, now downtrending  - DDx: prolonged downtime myositis v hyperthermia? (T 105.4 F) v sepsis v seizure induced        # Elevated Winston level  CHeck Abd US noted    GI eval PRN   Trend LFTs , normalized, no need for MRCP       #Partial SBO - resolved  - CT chest, abdomen, and pelvis w/ contrast concerning for possible partial SBO without transition point.  - Surgery consulted, no acute intervention at this time  - 12/5 KUB - negative for ileus or SBO  - Hold TF for 12/7 for possible trial of extubation.  - otherwise diet per nutrition      #Leukocytosis  - worsening leukocytosis  - monitor for fever  - Ortho eval fro knee swelling   - Pan Cx  - plan for LP by IR         #Concern for sepsis  Unclear source, aspiration v less likely meningitis   BCx (12/2 NGTD repeat 12/5 NGTD) and UC 12/2 NGTD  - Patient initially presented with AMS, T 105.4, tachycardic, and tachypneic   - UA negative  - s/p vanco and zosyn x1 in ED 12/2, ceftriaxone 2g BID 12/3-12/4, vanco 12/3-12/4  - 12/7 DCed Vanc since BCx from 12/5 NGTD  - c/w zosyn for aspiration pna presumed. completed course, monitor   - ID eval appreciated  - febrile again, Ortho for knee asp      # ANemia  noted, no gross bleeding  type and screen  GI eval

## 2022-12-21 NOTE — PROGRESS NOTE ADULT - ASSESSMENT
52-year-old  M known to me from outpatient setting with  TIAs (2011, 2013), Strokes x3 (02/2022 s/p tPA, 8/3/2022 s/p tPA, 11/5/2022 with residual expressive aphasia) on Eliquis, was on testosterone outpatient stopped after last stroke, HLD BIBEMS after being found unresponsive  Temp 105.4 on arrival tachy and /110. 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   CT cervical spine and maxillofacial scans negative.  CT chest, abdomen, and pelvis w/ contrast concerning for possible partial SBO without transition point. Surgery consulted in ED, no acute surgical intervention at this time.   CT thoracic and lumbar spine showing degenerative disc disease T6-T7 through L4-L5 and mild disc bulges at L2-L3 through L4-L5 that flatten the ventral thecal sac and narrowing of the bilateral neural foramina.  MRI 11/2022: R centrum semiovale and R posterior frontal infarcts   takes adderall at home   + rhabdo  EEG no seizures   + transaminitis   CTH 12/5 stable   outpatient hypercoag workup neg   12/5 extubated then reintibuated for seizure activity and tx back to ICU   EEG this AM 12/5 with only slowing   EEG 12/6 slowing but no seizures   spoke with Pippa Conti (friend) who states after he was taken off the testosterone he ordered a mail order testosterone supplement, unclear if he started it yet, unclear what this was  12/7 EEG no electrogtraphic seizures captured but R LPDs, rare L frontal discharges, severe GRDA.   12/8 EEG improved.  extubated. following some commands   12/9 moving uppers> lowers   12/11 AAOx2 uppers 4-5/5; not moving LLE well   MRI brain neg for new stroke   s/p L knee tap arthrocentesis  by ortho on 12/12   spoke with friend pippa conti - she counted his adderrall and didn't take extra tables. did find ashwagandha and red wood (bottle was sealed) supplement in his apartment   s/p LP 12/5 --> CSF glucose and protein WNL.  will f/u remaining studies. non infectious appearing   o/e AAOx2, slurred but 2/2 tongue bite   + transaminitis   more alert  12/20 neuro exam improving AAOx2-3     Impression:   1) AMS of unclear etiology, possible now seizure, related to adderral withdrawal? 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 had MIMI was question of PFO but not found. s/p ILR      - resume AC s/p LP when able given L knee --> eliquis 5mg BID restarted ; will consider stopping this in future  - possible MRCP planning for Monday. GI f/u.  elevated LFTs --> LFTs improved--> downtrending . MRCP deferred   - ortho for L knee s/p tap / arthrocentesis   -  Vimpat 100mg BID    - keppra 1500mg BID.   - fever on arrival, treatred initially for meningitis.  was on abx for aspiration PNA.  >LP done--> non  infectious   - IVF  - CPK elevated. trend improving   -   endocrine workup/eval   - there was some concern outpatient he may have a mixed  connective tissue disorder   - statin therapy for secondary stroke prevention when LFTS and CPK improved   - telemetry  - PT/OT/SS/SLP, OOBC  - check FS, glucose control <180  - GI/DVT ppx  - Thank you for allowing me to participate in the care of this patient. Call with questions.   - spoke with friend at bedside, Galilea 12/11   - spoke with Pippa Conti at [phone] for more collateral info and to provide update. spoke again 12/12 over phone. spoke 12/19   spoke with parents at bedside 12/20   Braxton Forde MD  Vascular Neurology  Office: [phone]

## 2022-12-21 NOTE — PROGRESS NOTE ADULT - SUBJECTIVE AND OBJECTIVE BOX
Neurology Progress Note    S: Patient seen and examined ,    . better     Medication:  MEDICATIONS  (STANDING):  apixaban 5 milliGRAM(s) Oral two times a day  aspirin enteric coated 81 milliGRAM(s) Oral daily  bisacodyl Suppository 10 milliGRAM(s) Rectal daily  chlorhexidine 2% Cloths 1 Application(s) Topical daily  dextrose 50% Injectable 25 Gram(s) IV Push once  dextrose 50% Injectable 12.5 Gram(s) IV Push once  dextrose 50% Injectable 25 Gram(s) IV Push once  dextrose Oral Gel 15 Gram(s) Oral once  diphtheria/tetanus/pertussis (acellular) Vaccine (Adacel) 0.5 milliLiter(s) IntraMuscular once  glucagon  Injectable 1 milliGRAM(s) IntraMuscular once  lacosamide IVPB 100 milliGRAM(s) IV Intermittent every 12 hours  levETIRAcetam  IVPB 1500 milliGRAM(s) IV Intermittent every 12 hours  lidocaine   4% Patch 1 Patch Transdermal every 24 hours  multivitamin 1 Tablet(s) Oral daily    MEDICATIONS  (PRN):  ibuprofen  Tablet. 400 milliGRAM(s) Oral every 6 hours PRN Mild Pain (1 - 3), Moderate Pain (4 - 6)  oxyCODONE    IR 5 milliGRAM(s) Oral every 6 hours PRN moderate and severe pain          Vitals:    Vital Signs Last 24 Hrs  T(C): 36.9 (21 Dec 2022 19:53), Max: 36.9 (20 Dec 2022 20:29)  T(F): 98.4 (21 Dec 2022 19:53), Max: 98.5 (21 Dec 2022 17:41)  HR: 105 (21 Dec 2022 19:53) (90 - 105)  BP: 109/72 (21 Dec 2022 19:53) (109/72 - 136/91)  BP(mean): --  RR: 18 (21 Dec 2022 19:53) (16 - 18)  SpO2: 97% (21 Dec 2022 19:53) (96% - 99%)    Parameters below as of 21 Dec 2022 19:53  Patient On (Oxygen Delivery Method): room air           General Exam:   General Appearance: Appropriately dressed and in no acute distress       Head: Normocephalic, atraumatic and no dysmorphic features  Ear, Nose, and Throat: Moist mucous membranes    CVS: S1S2+  Resp: No SOB, no wheeze or rhonchi  Abd: soft NTND  Extremities: No edema, no cyanosis  Skin: No bruises, no rashes     Neurological Exam:    Mental Status: Awake, Oriented x 2-3, able to follow simple and complex verbal commands. answers questions appropriately, able to name, repeat and recall. no aphasia, mild dysarthria (tongue bite)   Cranial Nerves: EOMI, PERRLA, VFF, V1-V3 sensation intact, no facial asymmetry, tongue midline, hearing intact B/L, shoulder shrug appropriate, SCM/trap intact.   Motor: Moving uppers > lowers. uppers at least 4+-5/5 ; not moving LLE very well 2/2 knee pain otherwise intact    Sensation: intact to LT and PP   Reflexes: 1+ throughout at biceps, brachioradialis, triceps, patellars and ankles bilaterally and equal. No clonus. R toe and L toe were both downgoing.  Coordination: no dysmetria   Gait: unable     I personally reviewed the below data/images/labs:  CBC Full  -  ( 21 Dec 2022 06:00 )  WBC Count : 11.71 K/uL  RBC Count : 3.68 M/uL  Hemoglobin : 11.0 g/dL  Hematocrit : 34.0 %  Platelet Count - Automated : 562 K/uL  Mean Cell Volume : 92.4 fL  Mean Cell Hemoglobin : 29.9 pg  Mean Cell Hemoglobin Concentration : 32.4 gm/dL  Auto Neutrophil # : x  Auto Lymphocyte # : x  Auto Monocyte # : x  Auto Eosinophil # : x  Auto Basophil # : x  Auto Neutrophil % : x  Auto Lymphocyte % : x  Auto Monocyte % : x  Auto Eosinophil % : x  Auto Basophil % : x    12-21    134<L>  |  102  |  16  ----------------------------<  131<H>  4.0   |  22  |  0.83    Ca    8.6      21 Dec 2022 06:00  Phos  3.9     12-21  Mg     2.00     12-21    TPro  6.9  /  Alb  2.7<L>  /  TBili  0.8  /  DBili  0.3  /  AST  41<H>  /  ALT  75<H>  /  AlkPhos  174<H>  12-21      < from: CT Head No Cont (12.02.22 @ 20:27) >    ACC: [id number] EXAM:  CT CERVICAL SPINE                        ACC: [id number] EXAM:  CT MAXILLOFACIAL                        ACC: [id number] EXAM:  CT BRAIN                          PROCEDURE DATE:  12/02/2022        INTERPRETATION:  CLINICAL INDICATION: Trauma.    Technique: Noncontrast axial CT of the head, facial bones, and cervical   spine was performed. 3-D reformats of the facial bones was obtained.   Coronal and sagittal reformats were obtained.      COMPARISON: None.    FINDINGS:  Head CT:  The ventricles and sulci are within normal limits. 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 There is no intraparenchymal hematoma, mass effect   or midline shift. No abnormal extra-axial fluid collections or   hemorrhages are present.    There is swelling of the left lateral scalp. The calvarium is intact.   There are scattered mucosal inflammatory changes in the paranasal sinuses.      Cervical spine CT:  Alignment ismaintained. Vertebral bodies are normal in height, without   evidence of fracture or dislocation. Prevertebral soft tissues are within   normal limits without soft tissue swelling or hematoma.    Intervertebral discs are intact. Neural foramina and spinal canal are   intact.    The visualized lung apices are within normal limits.      Facial bone CT:    No acute facial fracture.    There are scattered mucosal inflammatory changes in the paranasal   sinuses. Mastoid air cells are clear.    Soft tissues appear unremarkable.        IMPRESSION:  CT HEAD: No acute abnormality.  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  CT CERVICAL SPINE: No acute abnormality  CT FACIAL BONE: No acute abnormality    --- End of Report ---       DELGADO IVAN MD; Attending Radiologist  This document has been electronically signed. Dec  2 2022  9:02PM    < end of copied text >  < from: CT Lumbar Spine No Cont (12.02.22 @ 20:27) >    ACC: [id number] EXAM:  CT LUMBAR SPINE                        ACC: [id number] EXAM:  CT THORACIC SPINE                          PROCEDURE DATE:  12/02/2022          INTERPRETATION:  CT thoracic and lumbar spine without IV contrast    CLINICAL INFORMATION:  Trauma  Back pain, fracture.    TECHNIQUE:  Contiguous axial 2 mm sections were obtained through the   thoracic and lumbar spine using a single helical acquisition.     Additional 2 mm sagittal and coronal reconstructions of the spine were   obtained. These additional reformatted images were used to evaluate the   spine for alignment, vertebral fractures and the integrity of the the   posterior elements.   This scan was performed using automatic exposure   control (radiation dose reduction software) to obtain a diagnostic image   quality scan with patient dose as low as reasonably achievable.    FINDINGS:   No prior similar studies are available for review    Thoracic and lumbar vertebral body heights are maintained. No vertebral   fracture is seen. No destructive bone lesion is found.  Alignment is   preserved.  Facet joints appear intact and aligned.    Thoracic and lumbar intervertebral disc spaces appear intact.   Degenerative disc disease and spondylosis is noted at T6-T7 throughL4-5   with loss of disc height and associated degenerative endplate changes.   Mild disc bulges at L2-3 through L4-5 flatten the ventral thecal sac and   narrow the BILATERAL neural foramina.    No paraspinal mass is recognized.  Paraspinal soft tissues appear intact.      IMPRESSION:  Degenerative disc disease and spondylosis is noted at T6-T7   through L4-5 with loss of disc height and associated degenerative   endplate changes. Mild disc bulges at L2-3 through L4-5 flatten the   ventral thecal sac and narrow the BILATERAL neural foramina   No   vertebral fracture is recognized.    --- End of Report ---       ANGIE ESCOBAR MD; Attending Radiologist  This document has been electronically signed. Dec  2 2022  8:55PM    < end of copied text >    EEG Classification / Summary:  Abnormal EEG in a comatose patient due to diffuse suppression gradually improving to discontinuous background, with right hemispheric relative attenuation/suppression. No epileptiform abnormalities or seizures are captured.    Clinical Impression:  Severe diffuse cerebral dysfunction that gradually improves is nonspecific in etiology. In this case, it may be related to sedating medication (propofol) with improvement after decreasing and stopping the medication.  Right hemispheric focal cerebral dysfunction can be structural or functional in etiology.      12/7  Clinical Impression:  -Occasional runs of right frontal lateralized periodic discharges (LPDs) at up to 1.3 Hz indicate a potentially epileptogenic focus in the right frontal region and are on the ictal-interictal continuum.  -A pattern on the ictal-interictal continuum is a pattern that does not qualify as an electrographic seizure or electrographic status epilepticus, but there is a reasonable chance that it may be contributing to impaired alertness, causing other clinical symptoms, and/or contributing to neuronal injury.  -Right hemispheric relative attenuation indicates right hemispheric focal cerebral dysfunction, which can be structural or functional in etiology.  -Rare left frontal epileptiform discharges indicate a potentially epileptogenic focus in this reigon  -Severe diffuse slowing and GRDA indicate severe diffuse cerebral dysfunction of nonspecific etiology.  -No electrographic seizures are captured.     < from: MR Head w/wo IV Cont (12.09.22 @ 19:54) >    ACC: [id number] EXAM:  MR BRAIN WAW IC                          PROCEDURE DATE:  12/09/2022          INTERPRETATION:  CLINICAL INDICATION: CVA, found unresponsive at home      Magnetic resonance imaging of the brain was carried out with transaxial   SPGR, FLAIR, fast spin echo T2 weighted images, axial susceptibility   weighted series, diffusion weighted series and sagittal T1 weighted   series on a 1.5 Maritza magnet. Post contrast axial, coronal and sagittal   T1 weighted images were obtained. 10cc of Gadavist were intravenously   injected, 0 cc were discarded.      Comparison is made with the prior brain CT of 12/5/2022 and MRI 11/5/2022.    Mild ventricular and sulcal prominence is consistent with moderate   atrophy for the patient's age. No acute hemorrhage is identified. There   is a focus of hemosiderin within the alexis which is calcified on CT.   Scattered additional foci of hemosiderin deposition are identified in the   left frontal subcortical white matter and right occipital cortex is   nonspecific but may be related to multiple cavernoma is or hypertension.    There is a tiny focus of diffusion restriction in the right frontal   subcortical white matter and right centrum semiovale which are unchanged   since the prior exam and may represent subacute infarcts. Multiplicity   infarcts may be related to hypercoagulable state or cardioembolic events.    After contrast administration there is normal intracranial vascular   enhancement. No abnormal parenchymal or leptomeningeal enhancement.      IMPRESSION: Atrophy for the patient's age. Right frontal subcortical and   right centrum semiovale punctate infarcts are unchanged since 11/5/2022   and likely represent subacute infarcts. No abnormal enhancement. Focus of   calcification in the alexis with old hemosiderin. A few additional   scattered foci of hemosiderin deposition are unchanged.    --- End of Report ---            JUAN MANUEL CASTILLO MD; Attending Radiologist  This document has been electronically signed. Dec  9 2022  8:05PM    < end of copied text >

## 2022-12-21 NOTE — PROGRESS NOTE ADULT - SUBJECTIVE AND OBJECTIVE BOX
Name of Patient : TAMI DUNHAM  MRN: [id number]  Date of visit: 12-21-22       Subjective: Patient seen and examined. No new events except as noted.   Doing okay  calm     REVIEW OF SYSTEMS:    CONSTITUTIONAL: No weakness, fevers or chills  EYES/ENT: No visual changes;  No vertigo or throat pain   NECK: No pain or stiffness  RESPIRATORY: No cough, wheezing, hemoptysis; No shortness of breath  CARDIOVASCULAR: No chest pain or palpitations  GASTROINTESTINAL: No abdominal or epigastric pain. No nausea, vomiting, or hematemesis; No diarrhea or constipation. No melena or hematochezia.  GENITOURINARY: No dysuria, frequency or hematuria  NEUROLOGICAL: No numbness or weakness  SKIN: No itching, burning, rashes, or lesions   All other review of systems is negative unless indicated above.    MEDICATIONS:  MEDICATIONS  (STANDING):  apixaban 5 milliGRAM(s) Oral two times a day  aspirin enteric coated 81 milliGRAM(s) Oral daily  bisacodyl Suppository 10 milliGRAM(s) Rectal daily  chlorhexidine 2% Cloths 1 Application(s) Topical daily  dextrose 50% Injectable 25 Gram(s) IV Push once  dextrose 50% Injectable 12.5 Gram(s) IV Push once  dextrose 50% Injectable 25 Gram(s) IV Push once  dextrose Oral Gel 15 Gram(s) Oral once  diphtheria/tetanus/pertussis (acellular) Vaccine (Adacel) 0.5 milliLiter(s) IntraMuscular once  glucagon  Injectable 1 milliGRAM(s) IntraMuscular once  lacosamide IVPB 100 milliGRAM(s) IV Intermittent every 12 hours  levETIRAcetam  IVPB 1500 milliGRAM(s) IV Intermittent every 12 hours  lidocaine   4% Patch 1 Patch Transdermal every 24 hours  multivitamin 1 Tablet(s) Oral daily      PHYSICAL EXAM:  T(C): 36.8 (12-21-22 @ 12:29), Max: 36.9 (12-20-22 @ 20:29)  HR: 92 (12-21-22 @ 12:29) (90 - 100)  BP: 129/91 (12-21-22 @ 12:29) (120/87 - 136/91)  RR: 16 (12-21-22 @ 12:29) (16 - 18)  SpO2: 98% (12-21-22 @ 12:29) (97% - 99%)  Wt(kg): --  I&O's Summary        Appearance: Normal	  HEENT:  PERRLA   Lymphatic: No lymphadenopathy   Cardiovascular: Normal S1 S2, no JVD  Respiratory: normal effort , clear  Gastrointestinal:  Soft, Non-tender  Skin: No rashes,  warm to touch  Psychiatry:  Mood & affect appropriate  Musculuskeletal: Knee dressing       All labs, Imaging and EKGs personally reviewed                           11.0   11.71 )-----------( 562      ( 21 Dec 2022 06:00 )             34.0               12-21    134<L>  |  102  |  16  ----------------------------<  131<H>  4.0   |  22  |  0.83    Ca    8.6      21 Dec 2022 06:00  Phos  3.9     12-21  Mg     2.00     12-21    TPro  6.9  /  Alb  2.7<L>  /  TBili  0.8  /  DBili  0.3  /  AST  41<H>  /  ALT  75<H>  /  AlkPhos  174<H>  12-21

## 2022-12-21 NOTE — PROGRESS NOTE ADULT - SUBJECTIVE AND OBJECTIVE BOX
Subjective: Patient seen and examined. No new events except as noted.     REVIEW OF SYSTEMS:    CONSTITUTIONAL: +weakness, fevers or chills  EYES/ENT: No visual changes;  No vertigo or throat pain   NECK: No pain or stiffness  RESPIRATORY: No cough, wheezing, hemoptysis; No shortness of breath  CARDIOVASCULAR: No chest pain or palpitations  GASTROINTESTINAL: No abdominal or epigastric pain. No nausea, vomiting, or hematemesis; No diarrhea or constipation. No melena or hematochezia.  GENITOURINARY: No dysuria, frequency or hematuria  NEUROLOGICAL: No numbness or weakness  SKIN: No itching, burning, rashes, or lesions   All other review of systems is negative unless indicated above.    MEDICATIONS:  MEDICATIONS  (STANDING):  apixaban 5 milliGRAM(s) Oral two times a day  aspirin enteric coated 81 milliGRAM(s) Oral daily  bisacodyl Suppository 10 milliGRAM(s) Rectal daily  chlorhexidine 2% Cloths 1 Application(s) Topical daily  dextrose 50% Injectable 25 Gram(s) IV Push once  dextrose 50% Injectable 12.5 Gram(s) IV Push once  dextrose 50% Injectable 25 Gram(s) IV Push once  dextrose Oral Gel 15 Gram(s) Oral once  diphtheria/tetanus/pertussis (acellular) Vaccine (Adacel) 0.5 milliLiter(s) IntraMuscular once  glucagon  Injectable 1 milliGRAM(s) IntraMuscular once  lacosamide IVPB 100 milliGRAM(s) IV Intermittent every 12 hours  levETIRAcetam  IVPB 1500 milliGRAM(s) IV Intermittent every 12 hours  lidocaine   4% Patch 1 Patch Transdermal every 24 hours  multivitamin 1 Tablet(s) Oral daily      PHYSICAL EXAM:  T(C): 36.8 (12-21-22 @ 12:29), Max: 36.9 (12-20-22 @ 20:29)  HR: 92 (12-21-22 @ 12:29) (90 - 100)  BP: 129/91 (12-21-22 @ 12:29) (120/87 - 136/91)  RR: 16 (12-21-22 @ 12:29) (16 - 18)  SpO2: 98% (12-21-22 @ 12:29) (97% - 99%)  Wt(kg): --  I&O's Summary      Appearance: Normal	  HEENT:   Normal oral mucosa, PERRL, EOMI	  Lymphatic: No lymphadenopathy , no edema  Cardiovascular: Normal S1 S2, No JVD, No murmurs , Peripheral pulses palpable 2+ bilaterally  Respiratory: Lungs clear to auscultation, normal effort 	  Gastrointestinal:  Soft, Non-tender, + BS	  Skin: No rashes, No ecchymoses, No cyanosis, warm to touch  Musculoskeletal: Normal range of motion, normal strength  Psychiatry:  Mood & affect appropriate  Ext: No edema  Neuro: no focal deficits       LABS:    CARDIAC MARKERS:                                11.0   11.71 )-----------( 562      ( 21 Dec 2022 06:00 )             34.0     12-21    134<L>  |  102  |  16  ----------------------------<  131<H>  4.0   |  22  |  0.83    Ca    8.6      21 Dec 2022 06:00  Phos  3.9     12-21  Mg     2.00     12-21    TPro  6.9  /  Alb  2.7<L>  /  TBili  0.8  /  DBili  0.3  /  AST  41<H>  /  ALT  75<H>  /  AlkPhos  174<H>  12-21    proBNP:   Lipid Profile:   HgA1c:   TSH:             TELEMETRY: 	    ECG:  	  RADIOLOGY:   DIAGNOSTIC TESTING:  [ ] Echocardiogram:  [ ]  Catheterization:  [ ] Stress Test:    OTHER:

## 2022-12-21 NOTE — PROGRESS NOTE ADULT - ASSESSMENT
52-year-old male with a PMHx significant for TIAs (2011, 2013), CVAs x3 (02/2022 s/p tPA, 8/3/2022 s/p tPA, 11/5/2022 with residual expressive aphasia) on Eliquis, and HLD BIBEMS after being found unresponsive at home.nephrology consulted for hypernatremia    Hypernatremia  Secondary to dehydration  Hypernatremia resolved.   improved  monitor closely  avoid overcorrection    hyponatremia  work up suggested SIADH  free water restriction <1.2L/day  relatively stable  monitor    hypocalcemia  low alb  stable  Vit d 25 optimal  monitor 52-year-old male with a PMHx significant for TIAs (2011, 2013), CVAs x3 (02/2022 s/p tPA, 8/3/2022 s/p tPA, 11/5/2022 with residual expressive aphasia) on Eliquis, and HLD BIBEMS after being found unresponsive at home.nephrology consulted for hypernatremia    Had Hypernatremia  Secondary to dehydration  Hypernatremia resolved.   resolved  monitor closely  avoid overcorrection    hyponatremia  work up suggested SIADH  free water restriction <1.2L/day  relatively stable  monitor    hypocalcemia  low alb  stable  Vit d 25 optimal  monitor

## 2022-12-21 NOTE — PROGRESS NOTE ADULT - SUBJECTIVE AND OBJECTIVE BOX
[facility]   MD SIVA CLAUDIO MD KRISTINE SOLTANPOUR, DO ANGELA WONG, PA    TEL:  OFFICE: [phone]  From 5pm-7am Answering Service [phone]    -- RENAL FOLLOW UP NOTE ---Date of Service 12-21-22 @ 12:32    Patient is a 52y old  Male who presents with a chief complaint of Unresponsive (20 Dec 2022 19:29)      Patient seen and examined at bedside. No chest pain/sob    VITALS:  T(F): 98.3 (12-21-22 @ 12:29), Max: 99.2 (12-20-22 @ 13:15)  HR: 92 (12-21-22 @ 12:29)  BP: 129/91 (12-21-22 @ 12:29)  RR: 16 (12-21-22 @ 12:29)  SpO2: 98% (12-21-22 @ 12:29)  Wt(kg): --        PHYSICAL EXAM:  General: NAD  Neck: No JVD  Respiratory: CTAB, no wheezes, rales or rhonchi  Cardiovascular: S1, S2, RRR  Gastrointestinal: BS+, soft, NT/ND  Extremities: No peripheral edema    Hospital Medications:   MEDICATIONS  (STANDING):  apixaban 5 milliGRAM(s) Oral two times a day  aspirin enteric coated 81 milliGRAM(s) Oral daily  bisacodyl Suppository 10 milliGRAM(s) Rectal daily  chlorhexidine 2% Cloths 1 Application(s) Topical daily  dextrose 50% Injectable 25 Gram(s) IV Push once  dextrose 50% Injectable 12.5 Gram(s) IV Push once  dextrose 50% Injectable 25 Gram(s) IV Push once  dextrose Oral Gel 15 Gram(s) Oral once  diphtheria/tetanus/pertussis (acellular) Vaccine (Adacel) 0.5 milliLiter(s) IntraMuscular once  glucagon  Injectable 1 milliGRAM(s) IntraMuscular once  lacosamide IVPB 100 milliGRAM(s) IV Intermittent every 12 hours  levETIRAcetam  IVPB 1500 milliGRAM(s) IV Intermittent every 12 hours  lidocaine   4% Patch 1 Patch Transdermal every 24 hours      LABS:  12-21    134<L>  |  102  |  16  ----------------------------<  131<H>  4.0   |  22  |  0.83    Ca    8.6      21 Dec 2022 06:00  Phos  3.9     12-21  Mg     2.00     12-21    TPro  6.9  /  Alb  2.7<L>  /  TBili  0.8  /  DBili  0.3  /  AST  41<H>  /  ALT  75<H>  /  AlkPhos  174<H>  12-21    Creatinine Trend: 0.83 <--, 0.71 <--, 0.73 <--, 0.76 <--, 0.65 <--, 0.66 <--, 0.69 <--, 0.73 <--, 0.70 <--    Phosphorus Level, Serum: 3.9 mg/dL (12-21 @ 06:00)  Albumin, Serum: 2.7 g/dL (12-21 @ 06:00)                              11.0   11.71 )-----------( 562      ( 21 Dec 2022 06:00 )             34.0     Urine Studies:  Urinalysis - [12-05-22 @ 21:00]      Color Yellow / Appearance Clear / SG 1.048 / pH 7.5      Gluc Negative / Ketone Moderate  / Bili Negative / Urobili <2 mg/dL       Blood Negative / Protein 100 mg/dL / Leuk Est Negative / Nitrite Negative      RBC 0 / WBC 1 / Hyaline  / Gran  / Sq Epi  / Non Sq Epi  / Bacteria     Urine Sodium 60      [12-19-22 @ 15:00]  Urine Osmolality 389      [12-19-22 @ 15:00]    Iron 49, TIBC 142, %sat 35      [12-14-22 @ 04:30]  Ferritin 692      [12-14-22 @ 04:30]  PTH -- (Ca --)      [12-18-22 @ 06:46]   27  Vitamin D (25OH) 44.1      [12-18-22 @ 06:46]  TSH 1.30      [12-05-22 @ 02:30]  Lipid: chol 131, , HDL 34, LDL --      [11-05-22 @ 06:57]        RADIOLOGY & ADDITIONAL STUDIES:

## 2022-12-21 NOTE — CHART NOTE - NSCHARTNOTEFT_GEN_A_CORE
Pt seen for NUTRITION FOLLOW UP     Medical Course: 52-year-old male with a PMHx significant for TIAs (2011, 2013), CVAs x3 (02/2022 s/p tPA, 8/3/2022 s/p tPA, 11/5/2022 with residual expressive aphasia) on Eliquis, and HLD BIBEMS after being found unresponsive at home on the floor, last known well 12/1 at around noon. C-collar was placed. Patient was intubated in the ED for airway protection. CTH with no acute findings, vEEG with no identified seizures. Patient was weaned off pressors, extubated, and transitioned to floors 12/4/22. 12/5 am RRT called for seizure like activity with urinary incontinence, tongue biting, and R>L posturing, patient s/p ativan 2mg x3. Anesthesia called to RRT for intubation. MICU accepting patient for seizure like activity requiring intubation.      Nutrition Course: Nutrition interview: No recent episodes of nausea, vomiting, diarrhea or constipation, BM noted on 12/20 per RN flowsheets. Denies any chewing/swallowing difficulties. Continues on Easy to Chew diet per SLP recommendations on 12/16, refer to swallow eval note. Food preferences explored and noted. Intake is % per RN flowsheets and per pt. Feeding skills: assistance required from staff for eating. Pt wit no nutrition questions or concerns today.     Diet Prescription:   Diet, Easy to Chew:   DASH/TLC {Sodium & Cholesterol Restricted} (DASH) (12-16-22 @ 10:42) [Active]      Pertinent Medications: MEDICATIONS  (STANDING):  apixaban 5 milliGRAM(s) Oral two times a day  aspirin enteric coated 81 milliGRAM(s) Oral daily  bisacodyl Suppository 10 milliGRAM(s) Rectal daily  chlorhexidine 2% Cloths 1 Application(s) Topical daily  dextrose 50% Injectable 25 Gram(s) IV Push once  dextrose 50% Injectable 12.5 Gram(s) IV Push once  dextrose 50% Injectable 25 Gram(s) IV Push once  dextrose Oral Gel 15 Gram(s) Oral once  diphtheria/tetanus/pertussis (acellular) Vaccine (Adacel) 0.5 milliLiter(s) IntraMuscular once  glucagon  Injectable 1 milliGRAM(s) IntraMuscular once  lacosamide IVPB 100 milliGRAM(s) IV Intermittent every 12 hours  levETIRAcetam  IVPB 1500 milliGRAM(s) IV Intermittent every 12 hours  lidocaine   4% Patch 1 Patch Transdermal every 24 hours    MEDICATIONS  (PRN):  ibuprofen  Tablet. 400 milliGRAM(s) Oral every 6 hours PRN Mild Pain (1 - 3), Moderate Pain (4 - 6)  oxyCODONE    IR 5 milliGRAM(s) Oral every 6 hours PRN moderate and severe pain    Pertinent Labs: 12-21 Na134 mmol/L<L> Glu 131 mg/dL<H> K+ 4.0 mmol/L Cr  0.83 mg/dL BUN 16 mg/dL 12-21 Phos 3.9 mg/dL 12-21 Alb 2.7 g/dL<L>      POCT Blood Glucose.: 110 mg/dL (21 Dec 2022 12:41)  POCT Blood Glucose.: 101 mg/dL (21 Dec 2022 08:35)      Weight: Height (cm): 190.5 (12-02 @ 19:44)  Weight (kg): 95.1 (12/21), 95.7 (12-03 @ 00:25), 95.3 (11-22 @ 08:56)  BMI (kg/m2): 26.4 (12-03 @ 00:25)  Weight Assessment: Pt with stable wt x 2 weeks, and x 1 month (-0.6%, -0.2%, respectively).       Physical Assessment, per flowsheets:  Edema: None noted  skin: intact     Estimated Needs:   [X] No change since previous assessment    Previous Nutrition Diagnosis: Inadeqate Energy Intake, RT Altered GI function---> partial SBO. AEB NPO status.  Nutrition Diagnosis is [ ] ongoing  [ x] resolved [ ] not applicable   New Nutrition Diagnosis: [ x] No new nutrition dx at present.     Interventions:   1) Continue on current diet rx: Easy to chew, DASH/ TLC diet- refer to SLP for texture  2) Recommend multivitamin once daily for micronutrient provisions   3) RD to f/u prn     Monitor & Evaluate:  PO intake, tolerance to diet/supplement, nutrition related lab values, weight trends, BMs/GI distress, hydration status, skin integrity.    Izzy Bassett, MS, RDN (Pager #[phone]) | Also available on TEAMS

## 2022-12-22 LAB
ALBUMIN SERPL ELPH-MCNC: 2.9 G/DL — LOW (ref 3.3–5)
ALP SERPL-CCNC: 162 U/L — HIGH (ref 40–120)
ALT FLD-CCNC: 61 U/L — HIGH (ref 4–41)
ANION GAP SERPL CALC-SCNC: 11 MMOL/L — SIGNIFICANT CHANGE UP (ref 7–14)
AST SERPL-CCNC: 39 U/L — SIGNIFICANT CHANGE UP (ref 4–40)
B BURGDOR AB CSF-ACNC: SIGNIFICANT CHANGE UP
BILIRUB DIRECT SERPL-MCNC: 0.3 MG/DL — SIGNIFICANT CHANGE UP (ref 0–0.3)
BILIRUB INDIRECT FLD-MCNC: 0.6 MG/DL — SIGNIFICANT CHANGE UP (ref 0–1)
BILIRUB SERPL-MCNC: 0.9 MG/DL — SIGNIFICANT CHANGE UP (ref 0.2–1.2)
BUN SERPL-MCNC: 14 MG/DL — SIGNIFICANT CHANGE UP (ref 7–23)
CALCIUM SERPL-MCNC: 8.5 MG/DL — SIGNIFICANT CHANGE UP (ref 8.4–10.5)
CHLORIDE SERPL-SCNC: 99 MMOL/L — SIGNIFICANT CHANGE UP (ref 98–107)
CO2 SERPL-SCNC: 24 MMOL/L — SIGNIFICANT CHANGE UP (ref 22–31)
CREAT SERPL-MCNC: 0.83 MG/DL — SIGNIFICANT CHANGE UP (ref 0.5–1.3)
EGFR: 105 ML/MIN/1.73M2 — SIGNIFICANT CHANGE UP
GLUCOSE BLDC GLUCOMTR-MCNC: 107 MG/DL — HIGH (ref 70–99)
GLUCOSE BLDC GLUCOMTR-MCNC: 112 MG/DL — HIGH (ref 70–99)
GLUCOSE BLDC GLUCOMTR-MCNC: 142 MG/DL — HIGH (ref 70–99)
GLUCOSE BLDC GLUCOMTR-MCNC: 99 MG/DL — SIGNIFICANT CHANGE UP (ref 70–99)
GLUCOSE SERPL-MCNC: 94 MG/DL — SIGNIFICANT CHANGE UP (ref 70–99)
HCT VFR BLD CALC: 34.6 % — LOW (ref 39–50)
HGB BLD-MCNC: 11.2 G/DL — LOW (ref 13–17)
MAGNESIUM SERPL-MCNC: 1.9 MG/DL — SIGNIFICANT CHANGE UP (ref 1.6–2.6)
MCHC RBC-ENTMCNC: 30 PG — SIGNIFICANT CHANGE UP (ref 27–34)
MCHC RBC-ENTMCNC: 32.4 GM/DL — SIGNIFICANT CHANGE UP (ref 32–36)
MCV RBC AUTO: 92.8 FL — SIGNIFICANT CHANGE UP (ref 80–100)
NRBC # BLD: 0 /100 WBCS — SIGNIFICANT CHANGE UP (ref 0–0)
NRBC # FLD: 0 K/UL — SIGNIFICANT CHANGE UP (ref 0–0)
PHOSPHATE SERPL-MCNC: 4.2 MG/DL — SIGNIFICANT CHANGE UP (ref 2.5–4.5)
PLATELET # BLD AUTO: 557 K/UL — HIGH (ref 150–400)
POTASSIUM SERPL-MCNC: 4.2 MMOL/L — SIGNIFICANT CHANGE UP (ref 3.5–5.3)
POTASSIUM SERPL-SCNC: 4.2 MMOL/L — SIGNIFICANT CHANGE UP (ref 3.5–5.3)
PROT SERPL-MCNC: 6.9 G/DL — SIGNIFICANT CHANGE UP (ref 6–8.3)
RBC # BLD: 3.73 M/UL — LOW (ref 4.2–5.8)
RBC # FLD: 14.9 % — HIGH (ref 10.3–14.5)
SODIUM SERPL-SCNC: 134 MMOL/L — LOW (ref 135–145)
WBC # BLD: 10.66 K/UL — HIGH (ref 3.8–10.5)
WBC # FLD AUTO: 10.66 K/UL — HIGH (ref 3.8–10.5)

## 2022-12-22 PROCEDURE — 73630 X-RAY EXAM OF FOOT: CPT | Mod: 26,LT

## 2022-12-22 RX ADMIN — Medication 1 TABLET(S): at 11:16

## 2022-12-22 RX ADMIN — OXYCODONE HYDROCHLORIDE 5 MILLIGRAM(S): 5 TABLET ORAL at 05:14

## 2022-12-22 RX ADMIN — APIXABAN 5 MILLIGRAM(S): 2.5 TABLET, FILM COATED ORAL at 17:22

## 2022-12-22 RX ADMIN — Medication 10 MILLIGRAM(S): at 11:17

## 2022-12-22 RX ADMIN — OXYCODONE HYDROCHLORIDE 5 MILLIGRAM(S): 5 TABLET ORAL at 23:05

## 2022-12-22 RX ADMIN — OXYCODONE HYDROCHLORIDE 5 MILLIGRAM(S): 5 TABLET ORAL at 05:56

## 2022-12-22 RX ADMIN — APIXABAN 5 MILLIGRAM(S): 2.5 TABLET, FILM COATED ORAL at 05:14

## 2022-12-22 RX ADMIN — LIDOCAINE 1 PATCH: 4 CREAM TOPICAL at 05:53

## 2022-12-22 RX ADMIN — LACOSAMIDE 120 MILLIGRAM(S): 50 TABLET ORAL at 17:57

## 2022-12-22 RX ADMIN — LEVETIRACETAM 400 MILLIGRAM(S): 250 TABLET, FILM COATED ORAL at 05:01

## 2022-12-22 RX ADMIN — OXYCODONE HYDROCHLORIDE 5 MILLIGRAM(S): 5 TABLET ORAL at 11:16

## 2022-12-22 RX ADMIN — Medication 81 MILLIGRAM(S): at 11:16

## 2022-12-22 RX ADMIN — CHLORHEXIDINE GLUCONATE 1 APPLICATION(S): 213 SOLUTION TOPICAL at 11:17

## 2022-12-22 RX ADMIN — LACOSAMIDE 120 MILLIGRAM(S): 50 TABLET ORAL at 06:01

## 2022-12-22 RX ADMIN — LIDOCAINE 1 PATCH: 4 CREAM TOPICAL at 17:22

## 2022-12-22 RX ADMIN — LEVETIRACETAM 400 MILLIGRAM(S): 250 TABLET, FILM COATED ORAL at 17:23

## 2022-12-22 NOTE — PROGRESS NOTE ADULT - SUBJECTIVE AND OBJECTIVE BOX
Name of Patient : TAMI DUNHAM  MRN: [id number]  Date of visit: 12-22-22 @ 16:39      Subjective: Patient seen and examined. No new events except as noted.   doing okay     REVIEW OF SYSTEMS:    CONSTITUTIONAL: No weakness, fevers or chills  EYES/ENT: No visual changes;  No vertigo or throat pain   NECK: No pain or stiffness  RESPIRATORY: No cough, wheezing, hemoptysis; No shortness of breath  CARDIOVASCULAR: No chest pain or palpitations  GASTROINTESTINAL: No abdominal or epigastric pain. No nausea, vomiting, or hematemesis; No diarrhea or constipation. No melena or hematochezia.  GENITOURINARY: No dysuria, frequency or hematuria  NEUROLOGICAL: No numbness or weakness  SKIN: No itching, burning, rashes, or lesions   All other review of systems is negative unless indicated above.    MEDICATIONS:  MEDICATIONS  (STANDING):  apixaban 5 milliGRAM(s) Oral two times a day  aspirin enteric coated 81 milliGRAM(s) Oral daily  bisacodyl Suppository 10 milliGRAM(s) Rectal daily  chlorhexidine 2% Cloths 1 Application(s) Topical daily  dextrose 50% Injectable 25 Gram(s) IV Push once  dextrose 50% Injectable 12.5 Gram(s) IV Push once  dextrose 50% Injectable 25 Gram(s) IV Push once  dextrose Oral Gel 15 Gram(s) Oral once  diphtheria/tetanus/pertussis (acellular) Vaccine (Adacel) 0.5 milliLiter(s) IntraMuscular once  glucagon  Injectable 1 milliGRAM(s) IntraMuscular once  lacosamide IVPB 100 milliGRAM(s) IV Intermittent every 12 hours  levETIRAcetam  IVPB 1500 milliGRAM(s) IV Intermittent every 12 hours  lidocaine   4% Patch 1 Patch Transdermal every 24 hours  multivitamin 1 Tablet(s) Oral daily      PHYSICAL EXAM:  T(C): 36.3 (12-22-22 @ 11:45), Max: 36.9 (12-21-22 @ 17:41)  HR: 95 (12-22-22 @ 11:45) (94 - 105)  BP: 105/85 (12-22-22 @ 11:45) (105/85 - 122/83)  RR: 17 (12-22-22 @ 11:45) (16 - 18)  SpO2: 97% (12-22-22 @ 11:45) (96% - 98%)  Wt(kg): --  I&O's Summary        Appearance: Normal	  HEENT:  PERRLA   Lymphatic: No lymphadenopathy   Cardiovascular: Normal S1 S2, no JVD  Respiratory: normal effort , clear  Gastrointestinal:  Soft, Non-tender  Skin: No rashes,  warm to touch  Psychiatry:  Mood & affect appropriate  Musculuskeletal:L ankle and knee pain       All labs, Imaging and EKGs personally reviewed                           11.2   10.66 )-----------( 557      ( 22 Dec 2022 06:40 )             34.6               12-22    134<L>  |  99  |  14  ----------------------------<  94  4.2   |  24  |  0.83    Ca    8.5      22 Dec 2022 06:40  Phos  4.2     12-22  Mg     1.90     12-22    TPro  6.9  /  Alb  2.9<L>  /  TBili  0.9  /  DBili  0.3  /  AST  39  /  ALT  61<H>  /  AlkPhos  162<H>  12-22

## 2022-12-22 NOTE — PROGRESS NOTE ADULT - SUBJECTIVE AND OBJECTIVE BOX
[facility]   MD SIVA CLAUDIO MD KRISTINE SOLTANPOUR, DO ANGELA WONG, PA    TEL:  OFFICE: [phone]  From 5pm-7am Answering Service [phone]    -- RENAL FOLLOW UP NOTE ---Date of Service 12-22-22 @ 11:22    Patient is a 52y old  Male who presents with a chief complaint of Unresponsive (21 Dec 2022 18:07)      Patient seen and examined at bedside. No chest pain/sob. c/o left knee and ankle pain    VITALS:  T(F): 98.2 (12-22-22 @ 03:51), Max: 98.5 (12-21-22 @ 17:41)  HR: 94 (12-22-22 @ 03:51)  BP: 119/76 (12-22-22 @ 03:51)  RR: 16 (12-22-22 @ 03:51)  SpO2: 98% (12-22-22 @ 03:51)  Wt(kg): --        PHYSICAL EXAM:  General: NAD  Neck: No JVD  Respiratory: CTAB, no wheezes, rales or rhonchi  Cardiovascular: S1, S2, RRR  Gastrointestinal: BS+, soft, NT/ND  Extremities: No peripheral edema    Hospital Medications:   MEDICATIONS  (STANDING):  apixaban 5 milliGRAM(s) Oral two times a day  aspirin enteric coated 81 milliGRAM(s) Oral daily  bisacodyl Suppository 10 milliGRAM(s) Rectal daily  chlorhexidine 2% Cloths 1 Application(s) Topical daily  dextrose 50% Injectable 25 Gram(s) IV Push once  dextrose 50% Injectable 12.5 Gram(s) IV Push once  dextrose 50% Injectable 25 Gram(s) IV Push once  dextrose Oral Gel 15 Gram(s) Oral once  diphtheria/tetanus/pertussis (acellular) Vaccine (Adacel) 0.5 milliLiter(s) IntraMuscular once  glucagon  Injectable 1 milliGRAM(s) IntraMuscular once  lacosamide IVPB 100 milliGRAM(s) IV Intermittent every 12 hours  levETIRAcetam  IVPB 1500 milliGRAM(s) IV Intermittent every 12 hours  lidocaine   4% Patch 1 Patch Transdermal every 24 hours  multivitamin 1 Tablet(s) Oral daily      LABS:  12-22    134<L>  |  99  |  14  ----------------------------<  94  4.2   |  24  |  0.83    Ca    8.5      22 Dec 2022 06:40  Phos  4.2     12-22  Mg     1.90     12-22    TPro  6.9  /  Alb  2.9<L>  /  TBili  0.9  /  DBili  0.3  /  AST  39  /  ALT  61<H>  /  AlkPhos  162<H>  12-22    Creatinine Trend: 0.83 <--, 0.83 <--, 0.71 <--, 0.73 <--, 0.76 <--, 0.65 <--, 0.66 <--, 0.69 <--    Phosphorus Level, Serum: 4.2 mg/dL (12-22 @ 06:40)  Albumin, Serum: 2.9 g/dL (12-22 @ 06:40)                              11.2   10.66 )-----------( 557      ( 22 Dec 2022 06:40 )             34.6     Urine Studies:  Urinalysis - [12-05-22 @ 21:00]      Color Yellow / Appearance Clear / SG 1.048 / pH 7.5      Gluc Negative / Ketone Moderate  / Bili Negative / Urobili <2 mg/dL       Blood Negative / Protein 100 mg/dL / Leuk Est Negative / Nitrite Negative      RBC 0 / WBC 1 / Hyaline  / Gran  / Sq Epi  / Non Sq Epi  / Bacteria     Urine Sodium 60      [12-19-22 @ 15:00]  Urine Osmolality 389      [12-19-22 @ 15:00]    Iron 49, TIBC 142, %sat 35      [12-14-22 @ 04:30]  Ferritin 692      [12-14-22 @ 04:30]  PTH -- (Ca --)      [12-18-22 @ 06:46]   27  Vitamin D (25OH) 44.1      [12-18-22 @ 06:46]  TSH 1.30      [12-05-22 @ 02:30]  Lipid: chol 131, , HDL 34, LDL --      [11-05-22 @ 06:57]        RADIOLOGY & ADDITIONAL STUDIES:

## 2022-12-22 NOTE — PROGRESS NOTE ADULT - ASSESSMENT
52-year-old  M known to me from outpatient setting with  TIAs (2011, 2013), Strokes x3 (02/2022 s/p tPA, 8/3/2022 s/p tPA, 11/5/2022 with residual expressive aphasia) on Eliquis, was on testosterone outpatient stopped after last stroke, HLD BIBEMS after being found unresponsive  Temp 105.4 on arrival tachy and /110. 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   CT cervical spine and maxillofacial scans negative.  CT chest, abdomen, and pelvis w/ contrast concerning for possible partial SBO without transition point. Surgery consulted in ED, no acute surgical intervention at this time.   CT thoracic and lumbar spine showing degenerative disc disease T6-T7 through L4-L5 and mild disc bulges at L2-L3 through L4-L5 that flatten the ventral thecal sac and narrowing of the bilateral neural foramina.  MRI 11/2022: R centrum semiovale and R posterior frontal infarcts   takes adderall at home   + rhabdo  EEG no seizures   + transaminitis   CTH 12/5 stable   outpatient hypercoag workup neg   12/5 extubated then reintibuated for seizure activity and tx back to ICU   EEG this AM 12/5 with only slowing   EEG 12/6 slowing but no seizures   spoke with Pippa Conti (friend) who states after he was taken off the testosterone he ordered a mail order testosterone supplement, unclear if he started it yet, unclear what this was  12/7 EEG no electrogtraphic seizures captured but R LPDs, rare L frontal discharges, severe GRDA.   12/8 EEG improved.  extubated. following some commands   12/9 moving uppers> lowers   12/11 AAOx2 uppers 4-5/5; not moving LLE well   MRI brain neg for new stroke   s/p L knee tap arthrocentesis  by ortho on 12/12   spoke with friend pippa conti - she counted his adderrall and didn't take extra tables. did find ashwagandha and red wood (bottle was sealed) supplement in his apartment   s/p LP 12/5 --> CSF glucose and protein WNL.  will f/u remaining studies. non infectious appearing   o/e AAOx2, slurred but 2/2 tongue bite   + transaminitis   more alert  12/20 neuro exam improving AAOx2-3   c/o L ankle pain in addition to knee    Impression:   1) AMS of unclear etiology, possible now seizure, related to adderral withdrawal? 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 had MIMI was question of PFO but not found. s/p ILR    - consider L ankle imaging   - resume AC s/p LP when able given L knee --> eliquis 5mg BID restarted ; will consider stopping this in future  - possible MRCP planning for Monday. GI f/u.  elevated LFTs --> LFTs improved--> downtrending . MRCP deferred   - ortho for L knee s/p tap / arthrocentesis   -  Vimpat 100mg BID    - keppra 1500mg BID.   - fever on arrival, treatred initially for meningitis.  was on abx for aspiration PNA.  >LP done--> non  infectious   - IVF  - CPK elevated. trend improving   -   endocrine workup/eval   - there was some concern outpatient he may have a mixed  connective tissue disorder   - statin therapy for secondary stroke prevention when LFTS and CPK improved   - telemetry  - PT/OT/SS/SLP, OOBC  - check FS, glucose control <180  - GI/DVT ppx  - Thank you for allowing me to participate in the care of this patient. Call with questions.   - spoke with friend at bedside, Galilea 12/11   - spoke with Pippa Conti at [phone] for more collateral info and to provide update. spoke again 12/12 over phone. spoke 12/19   spoke with parents at bedside 12/20   d/c planning   Braxton Forde MD  Vascular Neurology  Office: [phone]

## 2022-12-22 NOTE — PROGRESS NOTE ADULT - SUBJECTIVE AND OBJECTIVE BOX
Neurology Progress Note    S: Patient seen and examined ,    . better c/o L ankle pain too     Medication:  MEDICATIONS  (STANDING):  apixaban 5 milliGRAM(s) Oral two times a day  aspirin enteric coated 81 milliGRAM(s) Oral daily  bisacodyl Suppository 10 milliGRAM(s) Rectal daily  chlorhexidine 2% Cloths 1 Application(s) Topical daily  dextrose 50% Injectable 25 Gram(s) IV Push once  dextrose 50% Injectable 12.5 Gram(s) IV Push once  dextrose 50% Injectable 25 Gram(s) IV Push once  dextrose Oral Gel 15 Gram(s) Oral once  diphtheria/tetanus/pertussis (acellular) Vaccine (Adacel) 0.5 milliLiter(s) IntraMuscular once  glucagon  Injectable 1 milliGRAM(s) IntraMuscular once  lacosamide IVPB 100 milliGRAM(s) IV Intermittent every 12 hours  levETIRAcetam  IVPB 1500 milliGRAM(s) IV Intermittent every 12 hours  lidocaine   4% Patch 1 Patch Transdermal every 24 hours  multivitamin 1 Tablet(s) Oral daily    MEDICATIONS  (PRN):  ibuprofen  Tablet. 400 milliGRAM(s) Oral every 6 hours PRN Mild Pain (1 - 3), Moderate Pain (4 - 6)  oxyCODONE    IR 5 milliGRAM(s) Oral every 6 hours PRN moderate and severe pain          Vitals:    Vital Signs Last 24 Hrs  T(C): 36.3 (12-22-22 @ 11:45), Max: 36.9 (12-21-22 @ 17:41)  T(F): 97.4 (12-22-22 @ 11:45), Max: 98.5 (12-21-22 @ 17:41)  HR: 95 (12-22-22 @ 11:45) (92 - 105)  BP: 105/85 (12-22-22 @ 11:45) (105/85 - 129/91)  BP(mean): --  RR: 17 (12-22-22 @ 11:45) (16 - 18)  SpO2: 97% (12-22-22 @ 11:45) (96% - 98%)           General Exam:   General Appearance: Appropriately dressed and in no acute distress       Head: Normocephalic, atraumatic and no dysmorphic features  Ear, Nose, and Throat: Moist mucous membranes    CVS: S1S2+  Resp: No SOB, no wheeze or rhonchi  Abd: soft NTND  Extremities: No edema, no cyanosis  Skin: No bruises, no rashes     Neurological Exam:    Mental Status: Awake, Oriented x 2-3, able to follow simple and complex verbal commands. answers questions appropriately, able to name, repeat and recall. no aphasia, mild dysarthria (tongue bite)   Cranial Nerves: EOMI, PERRLA, VFF, V1-V3 sensation intact, no facial asymmetry, tongue midline, hearing intact B/L, shoulder shrug appropriate, SCM/trap intact.   Motor: Moving uppers > lowers. uppers at least 4+-5/5 ; not moving LLE very well 2/2 knee pain otherwise intact    Sensation: intact to LT and PP   Reflexes: 1+ throughout at biceps, brachioradialis, triceps, patellars and ankles bilaterally and equal. No clonus. R toe and L toe were both downgoing.  Coordination: no dysmetria   Gait: unable     I personally reviewed the below data/images/labs:  CBC Full  -  ( 22 Dec 2022 06:40 )  WBC Count : 10.66 K/uL  RBC Count : 3.73 M/uL  Hemoglobin : 11.2 g/dL  Hematocrit : 34.6 %  Platelet Count - Automated : 557 K/uL  Mean Cell Volume : 92.8 fL  Mean Cell Hemoglobin : 30.0 pg  Mean Cell Hemoglobin Concentration : 32.4 gm/dL  Auto Neutrophil # : x  Auto Lymphocyte # : x  Auto Monocyte # : x  Auto Eosinophil # : x  Auto Basophil # : x  Auto Neutrophil % : x  Auto Lymphocyte % : x  Auto Monocyte % : x  Auto Eosinophil % : x  Auto Basophil % : x    12-22    134<L>  |  99  |  14  ----------------------------<  94  4.2   |  24  |  0.83    Ca    8.5      22 Dec 2022 06:40  Phos  4.2     12-22  Mg     1.90     12-22    TPro  6.9  /  Alb  2.9<L>  /  TBili  0.9  /  DBili  0.3  /  AST  39  /  ALT  61<H>  /  AlkPhos  162<H>  12-22      < from: CT Head No Cont (12.02.22 @ 20:27) >    ACC: [id number] EXAM:  CT CERVICAL SPINE                        ACC: [id number] EXAM:  CT MAXILLOFACIAL                        ACC: [id number] EXAM:  CT BRAIN                          PROCEDURE DATE:  12/02/2022        INTERPRETATION:  CLINICAL INDICATION: Trauma.    Technique: Noncontrast axial CT of the head, facial bones, and cervical   spine was performed. 3-D reformats of the facial bones was obtained.   Coronal and sagittal reformats were obtained.      COMPARISON: None.    FINDINGS:  Head CT:  The ventricles and sulci are within normal limits. 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 There is no intraparenchymal hematoma, mass effect   or midline shift. No abnormal extra-axial fluid collections or   hemorrhages are present.    There is swelling of the left lateral scalp. The calvarium is intact.   There are scattered mucosal inflammatory changes in the paranasal sinuses.      Cervical spine CT:  Alignment ismaintained. Vertebral bodies are normal in height, without   evidence of fracture or dislocation. Prevertebral soft tissues are within   normal limits without soft tissue swelling or hematoma.    Intervertebral discs are intact. Neural foramina and spinal canal are   intact.    The visualized lung apices are within normal limits.      Facial bone CT:    No acute facial fracture.    There are scattered mucosal inflammatory changes in the paranasal   sinuses. Mastoid air cells are clear.    Soft tissues appear unremarkable.        IMPRESSION:  CT HEAD: No acute abnormality.  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  CT CERVICAL SPINE: No acute abnormality  CT FACIAL BONE: No acute abnormality    --- End of Report ---       DELGADO IVAN MD; Attending Radiologist  This document has been electronically signed. Dec  2 2022  9:02PM    < end of copied text >  < from: CT Lumbar Spine No Cont (12.02.22 @ 20:27) >    ACC: [id number] EXAM:  CT LUMBAR SPINE                        ACC: [id number] EXAM:  CT THORACIC SPINE                          PROCEDURE DATE:  12/02/2022          INTERPRETATION:  CT thoracic and lumbar spine without IV contrast    CLINICAL INFORMATION:  Trauma  Back pain, fracture.    TECHNIQUE:  Contiguous axial 2 mm sections were obtained through the   thoracic and lumbar spine using a single helical acquisition.     Additional 2 mm sagittal and coronal reconstructions of the spine were   obtained. These additional reformatted images were used to evaluate the   spine for alignment, vertebral fractures and the integrity of the the   posterior elements.   This scan was performed using automatic exposure   control (radiation dose reduction software) to obtain a diagnostic image   quality scan with patient dose as low as reasonably achievable.    FINDINGS:   No prior similar studies are available for review    Thoracic and lumbar vertebral body heights are maintained. No vertebral   fracture is seen. No destructive bone lesion is found.  Alignment is   preserved.  Facet joints appear intact and aligned.    Thoracic and lumbar intervertebral disc spaces appear intact.   Degenerative disc disease and spondylosis is noted at T6-T7 throughL4-5   with loss of disc height and associated degenerative endplate changes.   Mild disc bulges at L2-3 through L4-5 flatten the ventral thecal sac and   narrow the BILATERAL neural foramina.    No paraspinal mass is recognized.  Paraspinal soft tissues appear intact.      IMPRESSION:  Degenerative disc disease and spondylosis is noted at T6-T7   through L4-5 with loss of disc height and associated degenerative   endplate changes. Mild disc bulges at L2-3 through L4-5 flatten the   ventral thecal sac and narrow the BILATERAL neural foramina   No   vertebral fracture is recognized.    --- End of Report ---       ANGIE ESCOBAR MD; Attending Radiologist  This document has been electronically signed. Dec  2 2022  8:55PM    < end of copied text >    EEG Classification / Summary:  Abnormal EEG in a comatose patient due to diffuse suppression gradually improving to discontinuous background, with right hemispheric relative attenuation/suppression. No epileptiform abnormalities or seizures are captured.    Clinical Impression:  Severe diffuse cerebral dysfunction that gradually improves is nonspecific in etiology. In this case, it may be related to sedating medication (propofol) with improvement after decreasing and stopping the medication.  Right hemispheric focal cerebral dysfunction can be structural or functional in etiology.      12/7  Clinical Impression:  -Occasional runs of right frontal lateralized periodic discharges (LPDs) at up to 1.3 Hz indicate a potentially epileptogenic focus in the right frontal region and are on the ictal-interictal continuum.  -A pattern on the ictal-interictal continuum is a pattern that does not qualify as an electrographic seizure or electrographic status epilepticus, but there is a reasonable chance that it may be contributing to impaired alertness, causing other clinical symptoms, and/or contributing to neuronal injury.  -Right hemispheric relative attenuation indicates right hemispheric focal cerebral dysfunction, which can be structural or functional in etiology.  -Rare left frontal epileptiform discharges indicate a potentially epileptogenic focus in this reigon  -Severe diffuse slowing and GRDA indicate severe diffuse cerebral dysfunction of nonspecific etiology.  -No electrographic seizures are captured.     < from: MR Head w/wo IV Cont (12.09.22 @ 19:54) >    ACC: [id number] EXAM:  MR BRAIN WAW IC                          PROCEDURE DATE:  12/09/2022          INTERPRETATION:  CLINICAL INDICATION: CVA, found unresponsive at home      Magnetic resonance imaging of the brain was carried out with transaxial   SPGR, FLAIR, fast spin echo T2 weighted images, axial susceptibility   weighted series, diffusion weighted series and sagittal T1 weighted   series on a 1.5 Maritza magnet. Post contrast axial, coronal and sagittal   T1 weighted images were obtained. 10cc of Gadavist were intravenously   injected, 0 cc were discarded.      Comparison is made with the prior brain CT of 12/5/2022 and MRI 11/5/2022.    Mild ventricular and sulcal prominence is consistent with moderate   atrophy for the patient's age. No acute hemorrhage is identified. There   is a focus of hemosiderin within the alexis which is calcified on CT.   Scattered additional foci of hemosiderin deposition are identified in the   left frontal subcortical white matter and right occipital cortex is   nonspecific but may be related to multiple cavernoma is or hypertension.    There is a tiny focus of diffusion restriction in the right frontal   subcortical white matter and right centrum semiovale which are unchanged   since the prior exam and may represent subacute infarcts. Multiplicity   infarcts may be related to hypercoagulable state or cardioembolic events.    After contrast administration there is normal intracranial vascular   enhancement. No abnormal parenchymal or leptomeningeal enhancement.      IMPRESSION: Atrophy for the patient's age. Right frontal subcortical and   right centrum semiovale punctate infarcts are unchanged since 11/5/2022   and likely represent subacute infarcts. No abnormal enhancement. Focus of   calcification in the alexis with old hemosiderin. A few additional   scattered foci of hemosiderin deposition are unchanged.    --- End of Report ---            JUAN MANUEL CASTILLO MD; Attending Radiologist  This document has been electronically signed. Dec  9 2022  8:05PM    < end of copied text >

## 2022-12-22 NOTE — PROGRESS NOTE ADULT - ASSESSMENT
52-year-old male with a PMHx significant for TIAs (2011, 2013), CVAs x3 (02/2022 s/p tPA, 8/3/2022 s/p tPA, 11/5/2022 with residual expressive aphasia) on Eliquis, and HLD BIBEMS after being found unresponsive at home on the floor, last known well 12/1 at around noon. C-collar was placed. Patient was intubated in the ED for airway protection. CTH with no acute findings, vEEG with no identified seizures. Patient was weaned off pressors, extubated, and transitioned to floors 12/4/22. 12/5 am RRT called for seizure like activity with urinary incontinence, tongue biting, and R>L posturing, patient s/p ativan 2mg x3. Anesthesia called to RRT for intubation. MICU accepting patient for seizure like activity requiring intubation.      #AMS, Seizure like activity  EEG 12/7: concern for epileptiform activity; though EEG from 12/5 without such abnormalities.   - f/u neuro recs  - Patient found down and unresponsive at home on 12/2, last known well 12/1 at around noon. Intubated in ED 12/2, extubated in MICU 12/4  - RRT 12/5: seizure like acting with urinary incontinence, convulsions, and tongue biting; s/p ativan 2mg x3 with pauses in seizure activity following each; intubated for airway protection with rocuronium. Prolactin ~75   - CTH and CT cervical spine 12/2 negative for any acute pathologies. Tox screen on admission negative. 12/5: Stable exam from prior CT head, chronic microvascular ischemic changes, parenchymal loss, dystrophic calcification of central portion of alexis unchanged.  - Video EEG 12/3-12/4 without any seizure like activity, moderate to severe nonspecific diffuse or multifocal cerebral dysfunction  - c/w keppra 1500mg q12 maintenance, Keppra loaded 4.5g  - patient extubated again on 12/8th  -MRI noted, chronci CVA, no acute CVA noted         #CVAs/TIAs  - Hx of TIAs in 2011 and 2013, CVAs x3 in 02/22, 8/22, and 11/22 with residual expressive aphasia   - on Eliquis and Aspirin at home for hx of stroke  - PFO work up in past negative, no evidence of PFO on MIMI X2  - c/w ASA   - c/w heparin gtt, was on hold, resume eliquis   - Restart Statin when LFTs and CPK improve per neurology  - Being worked up for Mixed Connective Tissue Disease OP- f/u p-ANCA, c-ANCA/ [id number] negative  - Neuro recs appreciated    # Hypernatremia  tredn Na level   renal eval appreciated  S/P hydraiton, trend Na level     #Thecal sac flattening   - CT thoracic and lumbar spine showing degenerative disc disease T6-T7 through L4-L5 and mild disc bulges at L2-L3 through L4-L5 that flatten the ventral thecal sac and narrowing of the bilateral neural foramina  - Will Monitor for now, will consider neuro/neurosurg evaluation in the future    #HLD  - atorvastatin on hold due to elevated CK and LFTs  - restart when possible for secondary stroke prevention    #?ASD/PFO  - Patient reportedly found to have a PFO in February 2022 during a stroke workup at [facility]. Patient presented for a PFO closure in November 2022. Notably, no PFO was found at the time and no intervention was performed by Dr. Lynn.    - TTE 11/5/22 EF 57% and grossly normal LV function  - MIMI performed bedside 12/5 1 of 2 studies (+) on bubble study (uploaded to [employer])        #Rhabdomyolysis  - CK 8720 on admission, peaked at 15k, now downtrending  - DDx: prolonged downtime myositis v hyperthermia? (T 105.4 F) v sepsis v seizure induced    - L Ankle adn Knee pain, swelling  S/P Tap last week  cont to have pain and tenderness and swelling  Repeat imaging   Ortho follow up         # Elevated Winston level  CHeck Abd US noted    GI eval PRN   Trend LFTs , normalized, no need for MRCP       #Partial SBO - resolved  - CT chest, abdomen, and pelvis w/ contrast concerning for possible partial SBO without transition point.  - Surgery consulted, no acute intervention at this time  - 12/5 KUB - negative for ileus or SBO  - Hold TF for 12/7 for possible trial of extubation.  - otherwise diet per nutrition      #Leukocytosis  - worsening leukocytosis  - monitor for fever  - Ortho eval fro knee swelling   - Pan Cx  - plan for LP by IR         #Concern for sepsis  Unclear source, aspiration v less likely meningitis   BCx (12/2 NGTD repeat 12/5 NGTD) and UC 12/2 NGTD  - Patient initially presented with AMS, T 105.4, tachycardic, and tachypneic   - UA negative  - s/p vanco and zosyn x1 in ED 12/2, ceftriaxone 2g BID 12/3-12/4, vanco 12/3-12/4  - 12/7 DCed Vanc since BCx from 12/5 NGTD  - c/w zosyn for aspiration pna presumed. completed course, monitor   - ID eval appreciated  - febrile again, Ortho for knee asp      # ANemia  noted, no gross bleeding  type and screen  GI eval

## 2022-12-22 NOTE — PHARMACOTHERAPY INTERVENTION NOTE - COMMENTS
New discharge medications reviewed with the patients mom. Outpatient medication schedule was discussed in detail including: medication name, indication, administration times, side effects, and special instructions. Patients moms questions and concerns were answered and addressed. Patients mom demonstrated understanding. Informed mom that medication list may change prior to discharge. Patient provided with educational handout.     Beronica Chowdhury, PharmD, AAHIVP  Clinical Pharmacy Specialist  Spectra: [phone]

## 2022-12-22 NOTE — PROGRESS NOTE ADULT - ASSESSMENT
52-year-old male with a PMHx significant for TIAs (2011, 2013), CVAs x3 (02/2022 s/p tPA, 8/3/2022 s/p tPA, 11/5/2022 with residual expressive aphasia) on Eliquis, and HLD BIBEMS after being found unresponsive at home.nephrology consulted for hypernatremia    Had Hypernatremia  Secondary to dehydration  Hypernatremia resolved.   resolved  monitor closely  avoid overcorrection    hyponatremia  work up suggested SIADH  free water restriction <1.2L/day  relatively stable  monitor    hypocalcemia  low alb  stable  Vit d 25 optimal  monitor

## 2022-12-23 LAB
ALBUMIN SERPL ELPH-MCNC: 3 G/DL — LOW (ref 3.3–5)
ALP SERPL-CCNC: 153 U/L — HIGH (ref 40–120)
ALT FLD-CCNC: 50 U/L — HIGH (ref 4–41)
ANION GAP SERPL CALC-SCNC: 10 MMOL/L — SIGNIFICANT CHANGE UP (ref 7–14)
AST SERPL-CCNC: 31 U/L — SIGNIFICANT CHANGE UP (ref 4–40)
BASOPHILS # BLD AUTO: 0.09 K/UL — SIGNIFICANT CHANGE UP (ref 0–0.2)
BASOPHILS NFR BLD AUTO: 1.1 % — SIGNIFICANT CHANGE UP (ref 0–2)
BILIRUB SERPL-MCNC: 0.8 MG/DL — SIGNIFICANT CHANGE UP (ref 0.2–1.2)
BUN SERPL-MCNC: 16 MG/DL — SIGNIFICANT CHANGE UP (ref 7–23)
CALCIUM SERPL-MCNC: 8.7 MG/DL — SIGNIFICANT CHANGE UP (ref 8.4–10.5)
CHLORIDE SERPL-SCNC: 97 MMOL/L — LOW (ref 98–107)
CO2 SERPL-SCNC: 27 MMOL/L — SIGNIFICANT CHANGE UP (ref 22–31)
CREAT SERPL-MCNC: 0.81 MG/DL — SIGNIFICANT CHANGE UP (ref 0.5–1.3)
EGFR: 106 ML/MIN/1.73M2 — SIGNIFICANT CHANGE UP
EOSINOPHIL # BLD AUTO: 0.22 K/UL — SIGNIFICANT CHANGE UP (ref 0–0.5)
EOSINOPHIL NFR BLD AUTO: 2.6 % — SIGNIFICANT CHANGE UP (ref 0–6)
GLUCOSE SERPL-MCNC: 89 MG/DL — SIGNIFICANT CHANGE UP (ref 70–99)
HCT VFR BLD CALC: 35.9 % — LOW (ref 39–50)
HGB BLD-MCNC: 11.2 G/DL — LOW (ref 13–17)
IANC: 5.59 K/UL — SIGNIFICANT CHANGE UP (ref 1.8–7.4)
IMM GRANULOCYTES NFR BLD AUTO: 3.9 % — HIGH (ref 0–0.9)
LYMPHOCYTES # BLD AUTO: 1.52 K/UL — SIGNIFICANT CHANGE UP (ref 1–3.3)
LYMPHOCYTES # BLD AUTO: 17.9 % — SIGNIFICANT CHANGE UP (ref 13–44)
MAGNESIUM SERPL-MCNC: 2 MG/DL — SIGNIFICANT CHANGE UP (ref 1.6–2.6)
MCHC RBC-ENTMCNC: 29.3 PG — SIGNIFICANT CHANGE UP (ref 27–34)
MCHC RBC-ENTMCNC: 31.2 GM/DL — LOW (ref 32–36)
MCV RBC AUTO: 94 FL — SIGNIFICANT CHANGE UP (ref 80–100)
MONOCYTES # BLD AUTO: 0.72 K/UL — SIGNIFICANT CHANGE UP (ref 0–0.9)
MONOCYTES NFR BLD AUTO: 8.5 % — SIGNIFICANT CHANGE UP (ref 2–14)
NEUTROPHILS # BLD AUTO: 5.59 K/UL — SIGNIFICANT CHANGE UP (ref 1.8–7.4)
NEUTROPHILS NFR BLD AUTO: 66 % — SIGNIFICANT CHANGE UP (ref 43–77)
NRBC # BLD: 0 /100 WBCS — SIGNIFICANT CHANGE UP (ref 0–0)
NRBC # FLD: 0 K/UL — SIGNIFICANT CHANGE UP (ref 0–0)
PHOSPHATE SERPL-MCNC: 4.5 MG/DL — SIGNIFICANT CHANGE UP (ref 2.5–4.5)
PLATELET # BLD AUTO: 513 K/UL — HIGH (ref 150–400)
POTASSIUM SERPL-MCNC: 4.1 MMOL/L — SIGNIFICANT CHANGE UP (ref 3.5–5.3)
POTASSIUM SERPL-SCNC: 4.1 MMOL/L — SIGNIFICANT CHANGE UP (ref 3.5–5.3)
PROT SERPL-MCNC: 7.1 G/DL — SIGNIFICANT CHANGE UP (ref 6–8.3)
RBC # BLD: 3.82 M/UL — LOW (ref 4.2–5.8)
RBC # FLD: 14.8 % — HIGH (ref 10.3–14.5)
SODIUM SERPL-SCNC: 134 MMOL/L — LOW (ref 135–145)
WBC # BLD: 8.47 K/UL — SIGNIFICANT CHANGE UP (ref 3.8–10.5)
WBC # FLD AUTO: 8.47 K/UL — SIGNIFICANT CHANGE UP (ref 3.8–10.5)

## 2022-12-23 RX ADMIN — OXYCODONE HYDROCHLORIDE 5 MILLIGRAM(S): 5 TABLET ORAL at 19:53

## 2022-12-23 RX ADMIN — LIDOCAINE 1 PATCH: 4 CREAM TOPICAL at 19:00

## 2022-12-23 RX ADMIN — OXYCODONE HYDROCHLORIDE 5 MILLIGRAM(S): 5 TABLET ORAL at 11:53

## 2022-12-23 RX ADMIN — APIXABAN 5 MILLIGRAM(S): 2.5 TABLET, FILM COATED ORAL at 05:59

## 2022-12-23 RX ADMIN — Medication 81 MILLIGRAM(S): at 12:09

## 2022-12-23 RX ADMIN — Medication 400 MILLIGRAM(S): at 16:28

## 2022-12-23 RX ADMIN — Medication 1 TABLET(S): at 12:09

## 2022-12-23 RX ADMIN — Medication 10 MILLIGRAM(S): at 12:09

## 2022-12-23 RX ADMIN — CHLORHEXIDINE GLUCONATE 1 APPLICATION(S): 213 SOLUTION TOPICAL at 12:08

## 2022-12-23 RX ADMIN — Medication 400 MILLIGRAM(S): at 15:28

## 2022-12-23 RX ADMIN — LACOSAMIDE 120 MILLIGRAM(S): 50 TABLET ORAL at 18:39

## 2022-12-23 RX ADMIN — LIDOCAINE 1 PATCH: 4 CREAM TOPICAL at 17:36

## 2022-12-23 RX ADMIN — LEVETIRACETAM 400 MILLIGRAM(S): 250 TABLET, FILM COATED ORAL at 05:58

## 2022-12-23 RX ADMIN — OXYCODONE HYDROCHLORIDE 5 MILLIGRAM(S): 5 TABLET ORAL at 10:53

## 2022-12-23 RX ADMIN — OXYCODONE HYDROCHLORIDE 5 MILLIGRAM(S): 5 TABLET ORAL at 00:05

## 2022-12-23 RX ADMIN — LEVETIRACETAM 400 MILLIGRAM(S): 250 TABLET, FILM COATED ORAL at 17:55

## 2022-12-23 RX ADMIN — OXYCODONE HYDROCHLORIDE 5 MILLIGRAM(S): 5 TABLET ORAL at 18:53

## 2022-12-23 RX ADMIN — LACOSAMIDE 120 MILLIGRAM(S): 50 TABLET ORAL at 05:58

## 2022-12-23 RX ADMIN — LIDOCAINE 1 PATCH: 4 CREAM TOPICAL at 05:00

## 2022-12-23 RX ADMIN — APIXABAN 5 MILLIGRAM(S): 2.5 TABLET, FILM COATED ORAL at 17:41

## 2022-12-23 NOTE — PROGRESS NOTE ADULT - SUBJECTIVE AND OBJECTIVE BOX
[facility]   MD SIVA CLAUDIO MD RUORU WONG, PA    TEL:  FROM 9 AM to 5 PM ---OFFICE: [phone]    FROM 5 PM - 9 AM PLEASE CALL ANSWERING SERVICE: [phone]    RENAL FOLLOW UP NOTE--Date of Service 12-23-22 @ 08:47  --------------------------------------------------------------------------------  HPI:      Pt seen and examined at bedside.       PAST HISTORY  --------------------------------------------------------------------------------  No significant changes to PMH, PSH, FHx, SHx, unless otherwise noted    ALLERGIES & MEDICATIONS  --------------------------------------------------------------------------------  Allergies    No Known Allergies    Intolerances      Standing Inpatient Medications  apixaban 5 milliGRAM(s) Oral two times a day  aspirin enteric coated 81 milliGRAM(s) Oral daily  bisacodyl Suppository 10 milliGRAM(s) Rectal daily  chlorhexidine 2% Cloths 1 Application(s) Topical daily  dextrose 50% Injectable 25 Gram(s) IV Push once  dextrose 50% Injectable 12.5 Gram(s) IV Push once  dextrose 50% Injectable 25 Gram(s) IV Push once  dextrose Oral Gel 15 Gram(s) Oral once  diphtheria/tetanus/pertussis (acellular) Vaccine (Adacel) 0.5 milliLiter(s) IntraMuscular once  glucagon  Injectable 1 milliGRAM(s) IntraMuscular once  lacosamide IVPB 100 milliGRAM(s) IV Intermittent every 12 hours  levETIRAcetam  IVPB 1500 milliGRAM(s) IV Intermittent every 12 hours  lidocaine   4% Patch 1 Patch Transdermal every 24 hours  multivitamin 1 Tablet(s) Oral daily    PRN Inpatient Medications  ibuprofen  Tablet. 400 milliGRAM(s) Oral every 6 hours PRN  oxyCODONE    IR 5 milliGRAM(s) Oral every 6 hours PRN      REVIEW OF SYSTEMS  --------------------------------------------------------------------------------  General: no fever  MSK: no edema     VITALS/PHYSICAL EXAM  --------------------------------------------------------------------------------  T(C): 36.7 (12-23-22 @ 05:57), Max: 36.7 (12-23-22 @ 05:57)  HR: 81 (12-23-22 @ 05:57) (81 - 95)  BP: 115/75 (12-23-22 @ 05:57) (105/85 - 115/75)  RR: 16 (12-23-22 @ 05:57) (16 - 17)  SpO2: 98% (12-23-22 @ 05:57) (96% - 98%)  Wt(kg): --        Physical Exam:  	Gen: NAD  	HEENT: MMM  	Pulm: CTA B/L  	CV: S1S2  	Abd: Soft, +BS  	Ext: No LE edema B/L                      Neuro: Awake   	Skin: Warm and Dry   	Vascular access: NO HD catheter            no fish  LABS/STUDIES  --------------------------------------------------------------------------------              11.2   8.47  >-----------<  513      [12-23-22 @ 05:04]              35.9     134  |  99  |  14  ----------------------------<  94      [12-22-22 @ 06:40]  4.2   |  24  |  0.83        Ca     8.5     [12-22-22 @ 06:40]      Mg     1.90     [12-22-22 @ 06:40]      Phos  4.2     [12-22-22 @ 06:40]    TPro  6.9  /  Alb  2.9  /  TBili  0.9  /  DBili  0.3  /  AST  39  /  ALT  61  /  AlkPhos  162  [12-22-22 @ 06:40]          Creatinine Trend:  SCr 0.83 [12-22 @ 06:40]  SCr 0.83 [12-21 @ 06:00]  SCr 0.71 [12-20 @ 06:40]  SCr 0.73 [12-19 @ 07:05]  SCr 0.76 [12-18 @ 06:46]    Urinalysis - [12-05-22 @ 21:00]      Color Yellow / Appearance Clear / SG 1.048 / pH 7.5      Gluc Negative / Ketone Moderate  / Bili Negative / Urobili <2 mg/dL       Blood Negative / Protein 100 mg/dL / Leuk Est Negative / Nitrite Negative      RBC 0 / WBC 1 / Hyaline  / Gran  / Sq Epi  / Non Sq Epi  / Bacteria     Urine Sodium 60      [12-19-22 @ 15:00]  Urine Osmolality 389      [12-19-22 @ 15:00]    Iron 49, TIBC 142, %sat 35      [12-14-22 @ 04:30]  Ferritin 692      [12-14-22 @ 04:30]  PTH -- (Ca --)      [12-18-22 @ 06:46]   27  Vitamin D (25OH) 44.1      [12-18-22 @ 06:46]  TSH 1.30      [12-05-22 @ 02:30]  Lipid: chol 131, , HDL 34, LDL --      [11-05-22 @ 06:57]

## 2022-12-23 NOTE — PROGRESS NOTE ADULT - ASSESSMENT
52-year-old male with a PMHx significant for TIAs (2011, 2013), CVAs x3 (02/2022 s/p tPA, 8/3/2022 s/p tPA, 11/5/2022 with residual expressive aphasia) on Eliquis, and HLD BIBEMS after being found unresponsive at home.nephrology consulted for hypernatremia     Hypernatremia/Hyponatremia  Hypernatremia sec to dehydration   Hyponatremia sec to SIADH   pending labs today  monitor closely  avoid overcorrection      hypocalcemia  sec to low alb  stable  Vit d 25 optimal  monitor

## 2022-12-23 NOTE — PROGRESS NOTE ADULT - SUBJECTIVE AND OBJECTIVE BOX
Subjective: Patient seen and examined. No new events except as noted.     REVIEW OF SYSTEMS:    CONSTITUTIONAL:+ weakness, fevers or chills  EYES/ENT: No visual changes;  No vertigo or throat pain   NECK: No pain or stiffness  RESPIRATORY: No cough, wheezing, hemoptysis; No shortness of breath  CARDIOVASCULAR: No chest pain or palpitations  GASTROINTESTINAL: No abdominal or epigastric pain. No nausea, vomiting, or hematemesis; No diarrhea or constipation. No melena or hematochezia.  GENITOURINARY: No dysuria, frequency or hematuria  NEUROLOGICAL: No numbness or weakness  SKIN: No itching, burning, rashes, or lesions   All other review of systems is negative unless indicated above.    MEDICATIONS:  MEDICATIONS  (STANDING):  apixaban 5 milliGRAM(s) Oral two times a day  aspirin enteric coated 81 milliGRAM(s) Oral daily  bisacodyl Suppository 10 milliGRAM(s) Rectal daily  chlorhexidine 2% Cloths 1 Application(s) Topical daily  dextrose 50% Injectable 25 Gram(s) IV Push once  dextrose 50% Injectable 12.5 Gram(s) IV Push once  dextrose 50% Injectable 25 Gram(s) IV Push once  dextrose Oral Gel 15 Gram(s) Oral once  diphtheria/tetanus/pertussis (acellular) Vaccine (Adacel) 0.5 milliLiter(s) IntraMuscular once  glucagon  Injectable 1 milliGRAM(s) IntraMuscular once  lacosamide IVPB 100 milliGRAM(s) IV Intermittent every 12 hours  levETIRAcetam  IVPB 1500 milliGRAM(s) IV Intermittent every 12 hours  lidocaine   4% Patch 1 Patch Transdermal every 24 hours  multivitamin 1 Tablet(s) Oral daily      PHYSICAL EXAM:  T(C): 36.7 (12-23-22 @ 05:57), Max: 36.7 (12-23-22 @ 05:57)  HR: 81 (12-23-22 @ 05:57) (81 - 95)  BP: 115/75 (12-23-22 @ 05:57) (105/85 - 115/75)  RR: 16 (12-23-22 @ 05:57) (16 - 17)  SpO2: 98% (12-23-22 @ 05:57) (96% - 98%)  Wt(kg): --  I&O's Summary        Appearance: Normal	  HEENT:   Normal oral mucosa, PERRL, EOMI	  Lymphatic: No lymphadenopathy , no edema  Cardiovascular: Normal S1 S2, No JVD, No murmurs , Peripheral pulses palpable 2+ bilaterally  Respiratory: Lungs clear to auscultation, normal effort 	  Gastrointestinal:  Soft, Non-tender, + BS	  Skin: No rashes, No ecchymoses, No cyanosis, warm to touch  Musculoskeletal: Normal range of motion, normal strength  Psychiatry:  Mood & affect appropriate  Ext: No edema      LABS:    CARDIAC MARKERS:                                11.2   8.47  )-----------( 513      ( 23 Dec 2022 05:04 )             35.9     12-23    134<L>  |  97<L>  |  16  ----------------------------<  89  4.1   |  27  |  0.81    Ca    8.7      23 Dec 2022 05:04  Phos  4.5     12-23  Mg     2.00     12-23    TPro  7.1  /  Alb  3.0<L>  /  TBili  0.8  /  DBili  x   /  AST  31  /  ALT  50<H>  /  AlkPhos  153<H>  12-23    proBNP:   Lipid Profile:   HgA1c:   TSH:             TELEMETRY: 	    ECG:  	  RADIOLOGY:   DIAGNOSTIC TESTING:  [ ] Echocardiogram:  [ ]  Catheterization:  [ ] Stress Test:    OTHER: 	           Subjective: Patient seen and examined. No new events except as noted.   parents at bedside   L knee and heel pain   REVIEW OF SYSTEMS:    CONSTITUTIONAL:+ weakness, fevers or chills  EYES/ENT: No visual changes;  No vertigo or throat pain   NECK: No pain or stiffness  RESPIRATORY: No cough, wheezing, hemoptysis; No shortness of breath  CARDIOVASCULAR: No chest pain or palpitations  GASTROINTESTINAL: No abdominal or epigastric pain. No nausea, vomiting, or hematemesis; No diarrhea or constipation. No melena or hematochezia.  GENITOURINARY: No dysuria, frequency or hematuria  NEUROLOGICAL: No numbness or weakness  SKIN: No itching, burning, rashes, or lesions   All other review of systems is negative unless indicated above.    MEDICATIONS:  MEDICATIONS  (STANDING):  apixaban 5 milliGRAM(s) Oral two times a day  aspirin enteric coated 81 milliGRAM(s) Oral daily  bisacodyl Suppository 10 milliGRAM(s) Rectal daily  chlorhexidine 2% Cloths 1 Application(s) Topical daily  dextrose 50% Injectable 25 Gram(s) IV Push once  dextrose 50% Injectable 12.5 Gram(s) IV Push once  dextrose 50% Injectable 25 Gram(s) IV Push once  dextrose Oral Gel 15 Gram(s) Oral once  diphtheria/tetanus/pertussis (acellular) Vaccine (Adacel) 0.5 milliLiter(s) IntraMuscular once  glucagon  Injectable 1 milliGRAM(s) IntraMuscular once  lacosamide IVPB 100 milliGRAM(s) IV Intermittent every 12 hours  levETIRAcetam  IVPB 1500 milliGRAM(s) IV Intermittent every 12 hours  lidocaine   4% Patch 1 Patch Transdermal every 24 hours  multivitamin 1 Tablet(s) Oral daily      PHYSICAL EXAM:  T(C): 36.7 (12-23-22 @ 05:57), Max: 36.7 (12-23-22 @ 05:57)  HR: 81 (12-23-22 @ 05:57) (81 - 95)  BP: 115/75 (12-23-22 @ 05:57) (105/85 - 115/75)  RR: 16 (12-23-22 @ 05:57) (16 - 17)  SpO2: 98% (12-23-22 @ 05:57) (96% - 98%)  Wt(kg): --  I&O's Summary        Appearance: Normal	  HEENT:   Normal oral mucosa, PERRL, EOMI	  Lymphatic: No lymphadenopathy , no edema  Cardiovascular: Normal S1 S2, No JVD, No murmurs , Peripheral pulses palpable 2+ bilaterally  Respiratory: Lungs clear to auscultation, normal effort 	  Gastrointestinal:  Soft, Non-tender, + BS	  Skin: No rashes, No ecchymoses, No cyanosis, warm to touch  Musculoskeletal: Normal range of motion, normal strength  Psychiatry:  Mood & affect appropriate  Ext: No edema  L heel tender to touch     LABS:    CARDIAC MARKERS:                                11.2   8.47  )-----------( 513      ( 23 Dec 2022 05:04 )             35.9     12-23    134<L>  |  97<L>  |  16  ----------------------------<  89  4.1   |  27  |  0.81    Ca    8.7      23 Dec 2022 05:04  Phos  4.5     12-23  Mg     2.00     12-23    TPro  7.1  /  Alb  3.0<L>  /  TBili  0.8  /  DBili  x   /  AST  31  /  ALT  50<H>  /  AlkPhos  153<H>  12-23    proBNP:   Lipid Profile:   HgA1c:   TSH:             TELEMETRY: 	    ECG:  	  RADIOLOGY:   DIAGNOSTIC TESTING:  [ ] Echocardiogram:  [ ]  Catheterization:  [ ] Stress Test:    OTHER:

## 2022-12-23 NOTE — PROGRESS NOTE ADULT - SUBJECTIVE AND OBJECTIVE BOX
Name of Patient : TAMI DUNHAM  MRN: [id number]  Date of visit: 12-23-22      Subjective: Patient seen and examined. No new events except as noted.   Doing okay     REVIEW OF SYSTEMS:    CONSTITUTIONAL: No weakness, fevers or chills  EYES/ENT: No visual changes;  No vertigo or throat pain   NECK: No pain or stiffness  RESPIRATORY: No cough, wheezing, hemoptysis; No shortness of breath  CARDIOVASCULAR: No chest pain or palpitations  GASTROINTESTINAL: No abdominal or epigastric pain. No nausea, vomiting, or hematemesis; No diarrhea or constipation. No melena or hematochezia.  GENITOURINARY: No dysuria, frequency or hematuria  NEUROLOGICAL: No numbness or weakness  SKIN: No itching, burning, rashes, or lesions   All other review of systems is negative unless indicated above.    MEDICATIONS:  MEDICATIONS  (STANDING):  apixaban 5 milliGRAM(s) Oral two times a day  aspirin enteric coated 81 milliGRAM(s) Oral daily  bisacodyl Suppository 10 milliGRAM(s) Rectal daily  chlorhexidine 2% Cloths 1 Application(s) Topical daily  dextrose 50% Injectable 25 Gram(s) IV Push once  dextrose 50% Injectable 12.5 Gram(s) IV Push once  dextrose 50% Injectable 25 Gram(s) IV Push once  dextrose Oral Gel 15 Gram(s) Oral once  diphtheria/tetanus/pertussis (acellular) Vaccine (Adacel) 0.5 milliLiter(s) IntraMuscular once  glucagon  Injectable 1 milliGRAM(s) IntraMuscular once  lacosamide IVPB 100 milliGRAM(s) IV Intermittent every 12 hours  levETIRAcetam  IVPB 1500 milliGRAM(s) IV Intermittent every 12 hours  lidocaine   4% Patch 1 Patch Transdermal every 24 hours  multivitamin 1 Tablet(s) Oral daily      PHYSICAL EXAM:  T(C): 36.6 (12-23-22 @ 17:31), Max: 36.8 (12-23-22 @ 12:27)  HR: 86 (12-23-22 @ 17:31) (81 - 89)  BP: 127/72 (12-23-22 @ 17:31) (107/88 - 127/72)  RR: 16 (12-23-22 @ 17:31) (16 - 16)  SpO2: 97% (12-23-22 @ 17:31) (97% - 98%)  Wt(kg): --  I&O's Summary        Appearance: Normal	  HEENT:  PERRLA   Lymphatic: No lymphadenopathy   Cardiovascular: Normal S1 S2, no JVD  Respiratory: normal effort , clear  Gastrointestinal:  Soft, Non-tender  Skin: No rashes,  warm to touch  Psychiatry:  Mood & affect appropriate  Musculuskeletal: lknee and ankle pain       All labs, Imaging and EKGs personally reviewed                           11.2   8.47  )-----------( 513      ( 23 Dec 2022 05:04 )             35.9               12-23    134<L>  |  97<L>  |  16  ----------------------------<  89  4.1   |  27  |  0.81    Ca    8.7      23 Dec 2022 05:04  Phos  4.5     12-23  Mg     2.00     12-23    TPro  7.1  /  Alb  3.0<L>  /  TBili  0.8  /  DBili  x   /  AST  31  /  ALT  50<H>  /  AlkPhos  153<H>  12-23

## 2022-12-23 NOTE — CONSULT NOTE ADULT - SUBJECTIVE AND OBJECTIVE BOX
Patient is a 52y old  Male who presents with a chief complaint of Unresponsive (23 Dec 2022 14:49)      HPI:  52-year-old male with a PMHx significant for TIAs (2011, 2013), CVAs x3 (02/2022 s/p tPA, 8/3/2022 s/p tPA, 11/5/2022 with residual expressive aphasia) on Eliquis, and HLD BIBEMS after being found unresponsive at home. Per patient's 2 friends in the ED, patient was last spoken to around noon on 12/1. No one had heard from him or seen him since yesterday, was not responding to calls this am, so they went to check on him today and they found him unresponsive at home with head on floor turned to the side and one leg was on the bed. Patient was snoring per his friends. Friends called EMS. EMS states he was grunting but minimally responsive. Patient arrived to the ER covered in dried blood in his mouth with multiple ecchymosis on the left side of his face, neck,, chest, and arm. Patient obtunded. Friend denies hx of ETOH or drug use. Of note, patient had a questionable ASD/PFO with possible closure on 11/22/22; per patient's friends no PFO was found and no closure was performed.  Per outpatient records, patient previously on testosterone transdermal solution which was stopped on 11/15 by his urologist. Per patient's friends at bedside, patient was recently prescribed a mail-order testosterone replacement, unsure of the name and unsure if he received and started the medication. At baseline, patient is AOx4, able to ambulate without assistance, and takes care of all ADLs without assistance.     On arrival, patient with rectal temp 105.4 F, tachy to 130s, hypertensive to 180/110, and tachypneic to 30. C-collar was placed. Patient was intubated in the ED for airway protection with etomidate and succinylcholine. Patient sedated with propofol and fentanyl. Started on vanco and zosyn. 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 CT cervical spine and maxillofacial scans negative. CT chest, abdomen, and pelvis w/ contrast concerning for possible partial SBO without transition point. Surgery consulted in ED, no acute surgical intervention at this time. CT thoracic and lumbar spine showing degenerative disc disease T6-T7 through L4-L5 and mild disc bulges at L2-L3 through L4-L5 that flatten the ventral thecal sac and narrowing of the bilateral neural foramina.   (02 Dec 2022 22:45)      PAST MEDICAL & SURGICAL HISTORY:  History of TIAs      CVA (cerebrovascular accident)      HLD (hyperlipidemia)      Vertigo      Unresponsiveness      No significant past surgical history          MEDICATIONS  (STANDING):  apixaban 5 milliGRAM(s) Oral two times a day  aspirin enteric coated 81 milliGRAM(s) Oral daily  bisacodyl Suppository 10 milliGRAM(s) Rectal daily  chlorhexidine 2% Cloths 1 Application(s) Topical daily  dextrose 50% Injectable 25 Gram(s) IV Push once  dextrose 50% Injectable 12.5 Gram(s) IV Push once  dextrose 50% Injectable 25 Gram(s) IV Push once  dextrose Oral Gel 15 Gram(s) Oral once  diphtheria/tetanus/pertussis (acellular) Vaccine (Adacel) 0.5 milliLiter(s) IntraMuscular once  glucagon  Injectable 1 milliGRAM(s) IntraMuscular once  lacosamide IVPB 100 milliGRAM(s) IV Intermittent every 12 hours  levETIRAcetam  IVPB 1500 milliGRAM(s) IV Intermittent every 12 hours  lidocaine   4% Patch 1 Patch Transdermal every 24 hours  multivitamin 1 Tablet(s) Oral daily    MEDICATIONS  (PRN):  ibuprofen  Tablet. 400 milliGRAM(s) Oral every 6 hours PRN Mild Pain (1 - 3), Moderate Pain (4 - 6)  oxyCODONE    IR 5 milliGRAM(s) Oral every 6 hours PRN moderate and severe pain      Allergies    No Known Allergies    Intolerances        VITALS:    Vital Signs Last 24 Hrs  T(C): 36.6 (23 Dec 2022 17:31), Max: 36.8 (23 Dec 2022 12:27)  T(F): 97.8 (23 Dec 2022 17:31), Max: 98.3 (23 Dec 2022 12:27)  HR: 86 (23 Dec 2022 17:31) (81 - 89)  BP: 127/72 (23 Dec 2022 17:31) (107/88 - 127/72)  BP(mean): --  RR: 16 (23 Dec 2022 17:31) (16 - 16)  SpO2: 97% (23 Dec 2022 17:31) (97% - 98%)    Parameters below as of 23 Dec 2022 17:31  Patient On (Oxygen Delivery Method): room air        LABS:                          11.2   8.47  )-----------( 513      ( 23 Dec 2022 05:04 )             35.9       12-23    134<L>  |  97<L>  |  16  ----------------------------<  89  4.1   |  27  |  0.81    Ca    8.7      23 Dec 2022 05:04  Phos  4.5     12-23  Mg     2.00     12-23    TPro  7.1  /  Alb  3.0<L>  /  TBili  0.8  /  DBili  x   /  AST  31  /  ALT  50<H>  /  AlkPhos  153<H>  12-23      CAPILLARY BLOOD GLUCOSE      POCT Blood Glucose.: 142 mg/dL (22 Dec 2022 21:16)          LOWER EXTREMITY PHYSICAL EXAM:    Vascular: DP/PT 2/4, B/L, CFT <3 seconds B/L, Temperature gradient WNL, B/L.   Neuro: Epicritic sensation intact right foot, diminished 0/4 ipswich light touch left foot  Musculoskeletal/Ortho: mild plantarflexed angle, reducible, 0/5 dorsiflexion left side and 4/5 eversion/inversion/plantarflexion - right is WNL  Skin: eschar left lateral fifth metatarsal base, no open lesion grossly, no signs of infection no erythema nor edema bilateral    RADIOLOGY & ADDITIONAL STUDIES:    < from: Xray Foot AP + Lateral + Oblique, Left (12.22.22 @ 13:20) >    ACC: [id number] EXAM:  XR FOOT COMP MIN 3 VIEWS LT                          PROCEDURE DATE:  12/22/2022          INTERPRETATION:  CLINICAL INDICATION: left foot pain    EXAM:  Frontal oblique lateral left foot from 12/22/2022 at 1320. No similar   prior studies available for comparison.    IMPRESSION:  No fractures or dislocations.    Tarsometatarsal alignment maintained without evidence for a Lisfranc   injury.    Congenitally fused 5th DIP joint. Preserved remaining visualized joint   spaces and no joint margin erosions.    No lytic or blastic lesions.    --- End of Report ---            JESSI MARSHALL MD; Attending Radiologist  This document has been electronically signed. Dec 22 2022  3:12PM    < end of copied text >

## 2022-12-23 NOTE — CONSULT NOTE ADULT - ASSESSMENT
51yo male with left foot/ankle pain, weakness  ·	Patient was seen and evaluated  ·	Chart reviewed, patient likely experiencing proximally derived weakness manifesting at the left ankle/foot appreciate neuro recs, no sign that there is anything peripherally abnormal with respect to neurologic etiology based on clinical exam at the foot and ankle level and negative radiographs  ·	No signs of infection at the foot and ankle level currently, eschar incidental and stable to left foot  ·	Continue Z flows, consider night splint avoid contracture if no signs of improvement are noted  ·	Will follow

## 2022-12-23 NOTE — PROGRESS NOTE ADULT - ASSESSMENT
52-year-old male with a PMHx significant for TIAs (2011, 2013), CVAs x3 (02/2022 s/p tPA, 8/3/2022 s/p tPA, 11/5/2022 with residual expressive aphasia) on Eliquis, and HLD BIBEMS after being found unresponsive at home on the floor, last known well 12/1 at around noon. C-collar was placed. Patient was intubated in the ED for airway protection. CTH with no acute findings, vEEG with no identified seizures. Patient was weaned off pressors, extubated, and transitioned to floors 12/4/22. 12/5 am RRT called for seizure like activity with urinary incontinence, tongue biting, and R>L posturing, patient s/p ativan 2mg x3. Anesthesia called to RRT for intubation. MICU accepting patient for seizure like activity requiring intubation.      #AMS, Seizure like activity  EEG 12/7: concern for epileptiform activity; though EEG from 12/5 without such abnormalities.   - f/u neuro recs  - Patient found down and unresponsive at home on 12/2, last known well 12/1 at around noon. Intubated in ED 12/2, extubated in MICU 12/4  - RRT 12/5: seizure like acting with urinary incontinence, convulsions, and tongue biting; s/p ativan 2mg x3 with pauses in seizure activity following each; intubated for airway protection with rocuronium. Prolactin ~75   - CTH and CT cervical spine 12/2 negative for any acute pathologies. Tox screen on admission negative. 12/5: Stable exam from prior CT head, chronic microvascular ischemic changes, parenchymal loss, dystrophic calcification of central portion of alexis unchanged.  - Video EEG 12/3-12/4 without any seizure like activity, moderate to severe nonspecific diffuse or multifocal cerebral dysfunction  - c/w keppra 1500mg q12 maintenance, Keppra loaded 4.5g  - patient extubated again on 12/8th  -MRI noted, chronci CVA, no acute CVA noted         #CVAs/TIAs  - Hx of TIAs in 2011 and 2013, CVAs x3 in 02/22, 8/22, and 11/22 with residual expressive aphasia   - on Eliquis and Aspirin at home for hx of stroke  - PFO work up in past negative, no evidence of PFO on MIMI X2  - c/w ASA   - c/w heparin gtt, was on hold, resume eliquis   - Restart Statin when LFTs and CPK improve per neurology  - Being worked up for Mixed Connective Tissue Disease OP- f/u p-ANCA, c-ANCA/ [id number] negative  - Neuro recs appreciated    # Hypernatremia  tredn Na level   renal eval appreciated  S/P hydraiton, trend Na level     #Thecal sac flattening   - CT thoracic and lumbar spine showing degenerative disc disease T6-T7 through L4-L5 and mild disc bulges at L2-L3 through L4-L5 that flatten the ventral thecal sac and narrowing of the bilateral neural foramina  - Will Monitor for now, will consider neuro/neurosurg evaluation in the future    #HLD  - atorvastatin on hold due to elevated CK and LFTs  - restart when possible for secondary stroke prevention    #?ASD/PFO  - Patient reportedly found to have a PFO in February 2022 during a stroke workup at [facility]. Patient presented for a PFO closure in November 2022. Notably, no PFO was found at the time and no intervention was performed by Dr. Lynn.    - TTE 11/5/22 EF 57% and grossly normal LV function  - MIMI performed bedside 12/5 1 of 2 studies (+) on bubble study (uploaded to [facility])        #Rhabdomyolysis  - CK 8720 on admission, peaked at 15k, now downtrending  - DDx: prolonged downtime myositis v hyperthermia? (T 105.4 F) v sepsis v seizure induced    - L Ankle adn Knee pain, swelling  S/P Tap last week  cont to have pain and tenderness and swelling  Repeat imaging   Ortho follow up         # Elevated Winston level  CHeck Abd US noted    GI eval PRN   Trend LFTs , normalized, no need for MRCP       #Partial SBO - resolved  - CT chest, abdomen, and pelvis w/ contrast concerning for possible partial SBO without transition point.  - Surgery consulted, no acute intervention at this time  - 12/5 KUB - negative for ileus or SBO  - Hold TF for 12/7 for possible trial of extubation.  - otherwise diet per nutrition      #Leukocytosis  - worsening leukocytosis  - monitor for fever  - Ortho eval fro knee swelling   - Pan Cx  - plan for LP by IR         #Concern for sepsis  Unclear source, aspiration v less likely meningitis   BCx (12/2 NGTD repeat 12/5 NGTD) and UC 12/2 NGTD  - Patient initially presented with AMS, T 105.4, tachycardic, and tachypneic   - UA negative  - s/p vanco and zosyn x1 in ED 12/2, ceftriaxone 2g BID 12/3-12/4, vanco 12/3-12/4  - 12/7 DCed Vanc since BCx from 12/5 NGTD  - c/w zosyn for aspiration pna presumed. completed course, monitor   - ID eval appreciated  - febrile again, Ortho for knee asp      # ANemia  noted, no gross bleeding  type and screen  GI eval

## 2022-12-24 LAB
ALBUMIN SERPL ELPH-MCNC: 3 G/DL — LOW (ref 3.3–5)
ALP SERPL-CCNC: 139 U/L — HIGH (ref 40–120)
ALT FLD-CCNC: 38 U/L — SIGNIFICANT CHANGE UP (ref 4–41)
ANION GAP SERPL CALC-SCNC: 8 MMOL/L — SIGNIFICANT CHANGE UP (ref 7–14)
APPEARANCE UR: CLEAR — SIGNIFICANT CHANGE UP
AST SERPL-CCNC: 24 U/L — SIGNIFICANT CHANGE UP (ref 4–40)
BACTERIA # UR AUTO: NEGATIVE — SIGNIFICANT CHANGE UP
BASOPHILS # BLD AUTO: 0.03 K/UL — SIGNIFICANT CHANGE UP (ref 0–0.2)
BASOPHILS NFR BLD AUTO: 0.4 % — SIGNIFICANT CHANGE UP (ref 0–2)
BILIRUB SERPL-MCNC: 0.6 MG/DL — SIGNIFICANT CHANGE UP (ref 0.2–1.2)
BILIRUB UR-MCNC: NEGATIVE — SIGNIFICANT CHANGE UP
BUN SERPL-MCNC: 14 MG/DL — SIGNIFICANT CHANGE UP (ref 7–23)
CALCIUM SERPL-MCNC: 8.2 MG/DL — LOW (ref 8.4–10.5)
CHLORIDE SERPL-SCNC: 102 MMOL/L — SIGNIFICANT CHANGE UP (ref 98–107)
CO2 SERPL-SCNC: 28 MMOL/L — SIGNIFICANT CHANGE UP (ref 22–31)
COLOR SPEC: SIGNIFICANT CHANGE UP
CREAT SERPL-MCNC: 0.84 MG/DL — SIGNIFICANT CHANGE UP (ref 0.5–1.3)
DIFF PNL FLD: NEGATIVE — SIGNIFICANT CHANGE UP
EGFR: 105 ML/MIN/1.73M2 — SIGNIFICANT CHANGE UP
EOSINOPHIL # BLD AUTO: 0.22 K/UL — SIGNIFICANT CHANGE UP (ref 0–0.5)
EOSINOPHIL NFR BLD AUTO: 3.1 % — SIGNIFICANT CHANGE UP (ref 0–6)
EPI CELLS # UR: 7 /HPF — HIGH (ref 0–5)
GLUCOSE SERPL-MCNC: 91 MG/DL — SIGNIFICANT CHANGE UP (ref 70–99)
GLUCOSE UR QL: NEGATIVE — SIGNIFICANT CHANGE UP
HCT VFR BLD CALC: 34.7 % — LOW (ref 39–50)
HGB BLD-MCNC: 10.9 G/DL — LOW (ref 13–17)
HYALINE CASTS # UR AUTO: 2 /LPF — SIGNIFICANT CHANGE UP (ref 0–7)
IANC: 4.52 K/UL — SIGNIFICANT CHANGE UP (ref 1.8–7.4)
IMM GRANULOCYTES NFR BLD AUTO: 2.4 % — HIGH (ref 0–0.9)
KETONES UR-MCNC: NEGATIVE — SIGNIFICANT CHANGE UP
LEUKOCYTE ESTERASE UR-ACNC: ABNORMAL
LYMPHOCYTES # BLD AUTO: 1.46 K/UL — SIGNIFICANT CHANGE UP (ref 1–3.3)
LYMPHOCYTES # BLD AUTO: 20.8 % — SIGNIFICANT CHANGE UP (ref 13–44)
MAGNESIUM SERPL-MCNC: 2.1 MG/DL — SIGNIFICANT CHANGE UP (ref 1.6–2.6)
MCHC RBC-ENTMCNC: 29.5 PG — SIGNIFICANT CHANGE UP (ref 27–34)
MCHC RBC-ENTMCNC: 31.4 GM/DL — LOW (ref 32–36)
MCV RBC AUTO: 93.8 FL — SIGNIFICANT CHANGE UP (ref 80–100)
MONOCYTES # BLD AUTO: 0.62 K/UL — SIGNIFICANT CHANGE UP (ref 0–0.9)
MONOCYTES NFR BLD AUTO: 8.8 % — SIGNIFICANT CHANGE UP (ref 2–14)
NEUTROPHILS # BLD AUTO: 4.52 K/UL — SIGNIFICANT CHANGE UP (ref 1.8–7.4)
NEUTROPHILS NFR BLD AUTO: 64.5 % — SIGNIFICANT CHANGE UP (ref 43–77)
NITRITE UR-MCNC: NEGATIVE — SIGNIFICANT CHANGE UP
NRBC # BLD: 0 /100 WBCS — SIGNIFICANT CHANGE UP (ref 0–0)
NRBC # FLD: 0 K/UL — SIGNIFICANT CHANGE UP (ref 0–0)
PH UR: 6.5 — SIGNIFICANT CHANGE UP (ref 5–8)
PHOSPHATE SERPL-MCNC: 3.6 MG/DL — SIGNIFICANT CHANGE UP (ref 2.5–4.5)
PLATELET # BLD AUTO: 474 K/UL — HIGH (ref 150–400)
POTASSIUM SERPL-MCNC: 4.4 MMOL/L — SIGNIFICANT CHANGE UP (ref 3.5–5.3)
POTASSIUM SERPL-SCNC: 4.4 MMOL/L — SIGNIFICANT CHANGE UP (ref 3.5–5.3)
PROT SERPL-MCNC: 6.9 G/DL — SIGNIFICANT CHANGE UP (ref 6–8.3)
PROT UR-MCNC: ABNORMAL
RBC # BLD: 3.7 M/UL — LOW (ref 4.2–5.8)
RBC # FLD: 14.6 % — HIGH (ref 10.3–14.5)
RBC CASTS # UR COMP ASSIST: 2 /HPF — SIGNIFICANT CHANGE UP (ref 0–4)
SODIUM SERPL-SCNC: 138 MMOL/L — SIGNIFICANT CHANGE UP (ref 135–145)
SP GR SPEC: 1.01 — LOW (ref 1.01–1.05)
UROBILINOGEN FLD QL: SIGNIFICANT CHANGE UP
WBC # BLD: 7.02 K/UL — SIGNIFICANT CHANGE UP (ref 3.8–10.5)
WBC # FLD AUTO: 7.02 K/UL — SIGNIFICANT CHANGE UP (ref 3.8–10.5)
WBC UR QL: 38 /HPF — HIGH (ref 0–5)

## 2022-12-24 RX ORDER — CEFTRIAXONE 500 MG/1
1000 INJECTION, POWDER, FOR SOLUTION INTRAMUSCULAR; INTRAVENOUS EVERY 24 HOURS
Refills: 0 | Status: COMPLETED | OUTPATIENT
Start: 2022-12-24 | End: 2022-12-26

## 2022-12-24 RX ADMIN — OXYCODONE HYDROCHLORIDE 5 MILLIGRAM(S): 5 TABLET ORAL at 03:06

## 2022-12-24 RX ADMIN — LACOSAMIDE 120 MILLIGRAM(S): 50 TABLET ORAL at 05:41

## 2022-12-24 RX ADMIN — OXYCODONE HYDROCHLORIDE 5 MILLIGRAM(S): 5 TABLET ORAL at 09:41

## 2022-12-24 RX ADMIN — OXYCODONE HYDROCHLORIDE 5 MILLIGRAM(S): 5 TABLET ORAL at 18:49

## 2022-12-24 RX ADMIN — Medication 1 TABLET(S): at 12:53

## 2022-12-24 RX ADMIN — APIXABAN 5 MILLIGRAM(S): 2.5 TABLET, FILM COATED ORAL at 05:47

## 2022-12-24 RX ADMIN — LIDOCAINE 1 PATCH: 4 CREAM TOPICAL at 19:43

## 2022-12-24 RX ADMIN — CEFTRIAXONE 100 MILLIGRAM(S): 500 INJECTION, POWDER, FOR SOLUTION INTRAMUSCULAR; INTRAVENOUS at 18:50

## 2022-12-24 RX ADMIN — LACOSAMIDE 120 MILLIGRAM(S): 50 TABLET ORAL at 19:41

## 2022-12-24 RX ADMIN — LEVETIRACETAM 400 MILLIGRAM(S): 250 TABLET, FILM COATED ORAL at 19:42

## 2022-12-24 RX ADMIN — LEVETIRACETAM 400 MILLIGRAM(S): 250 TABLET, FILM COATED ORAL at 05:41

## 2022-12-24 RX ADMIN — CHLORHEXIDINE GLUCONATE 1 APPLICATION(S): 213 SOLUTION TOPICAL at 12:53

## 2022-12-24 RX ADMIN — Medication 81 MILLIGRAM(S): at 12:53

## 2022-12-24 RX ADMIN — APIXABAN 5 MILLIGRAM(S): 2.5 TABLET, FILM COATED ORAL at 19:42

## 2022-12-24 NOTE — PROGRESS NOTE ADULT - ASSESSMENT
52-year-old male with a PMHx significant for TIAs (2011, 2013), CVAs x3 (02/2022 s/p tPA, 8/3/2022 s/p tPA, 11/5/2022 with residual expressive aphasia) on Eliquis, and HLD BIBEMS after being found unresponsive at home.nephrology consulted for hypernatremia     Hypernatremia/Hyponatremia  Hypernatremia sec to dehydration   Hyponatremia sec to SIADH   stable sodium   monitor closely  avoid overcorrection      hypocalcemia  sec to low alb  stable  Vit d 25 optimal  monitor

## 2022-12-24 NOTE — PROGRESS NOTE ADULT - PROBLEM SELECTOR PLAN 4
Hx of TIAs in 2011 and 2013, CVAs x3 in 02/22, 8/22, and 11/22 with residual expressive aphasia    - on ASA/Eliquis

## 2022-12-24 NOTE — PROGRESS NOTE ADULT - SUBJECTIVE AND OBJECTIVE BOX
Subjective: Patient seen and examined. No new events except as noted.     REVIEW OF SYSTEMS:    CONSTITUTIONAL: + weakness, fevers or chills  EYES/ENT: No visual changes;  No vertigo or throat pain   NECK: No pain or stiffness  RESPIRATORY: No cough, wheezing, hemoptysis; No shortness of breath  CARDIOVASCULAR: No chest pain or palpitations  GASTROINTESTINAL: No abdominal or epigastric pain. No nausea, vomiting, or hematemesis; No diarrhea or constipation. No melena or hematochezia.  GENITOURINARY: No dysuria, frequency or hematuria  NEUROLOGICAL: No numbness or weakness  SKIN: No itching, burning, rashes, or lesions   All other review of systems is negative unless indicated above.    MEDICATIONS:  MEDICATIONS  (STANDING):  apixaban 5 milliGRAM(s) Oral two times a day  aspirin enteric coated 81 milliGRAM(s) Oral daily  bisacodyl Suppository 10 milliGRAM(s) Rectal daily  chlorhexidine 2% Cloths 1 Application(s) Topical daily  dextrose 50% Injectable 25 Gram(s) IV Push once  dextrose 50% Injectable 12.5 Gram(s) IV Push once  dextrose 50% Injectable 25 Gram(s) IV Push once  dextrose Oral Gel 15 Gram(s) Oral once  diphtheria/tetanus/pertussis (acellular) Vaccine (Adacel) 0.5 milliLiter(s) IntraMuscular once  glucagon  Injectable 1 milliGRAM(s) IntraMuscular once  lacosamide IVPB 100 milliGRAM(s) IV Intermittent every 12 hours  levETIRAcetam  IVPB 1500 milliGRAM(s) IV Intermittent every 12 hours  lidocaine   4% Patch 1 Patch Transdermal every 24 hours  multivitamin 1 Tablet(s) Oral daily    PHYSICAL EXAM:  Vital Signs Last 24 Hrs  T(C): 36.3 (24 Dec 2022 09:35), Max: 36.7 (23 Dec 2022 15:26)  T(F): 97.4 (24 Dec 2022 09:35), Max: 98 (23 Dec 2022 15:26)  HR: 85 (24 Dec 2022 09:35) (85 - 87)  BP: 119/85 (24 Dec 2022 09:35) (111/82 - 127/72)  BP(mean): --  RR: 18 (24 Dec 2022 09:35) (16 - 18)  SpO2: 100% (24 Dec 2022 09:35) (97% - 100%)    Parameters below as of 24 Dec 2022 09:35  Patient On (Oxygen Delivery Method): room air    I&O's Summary    Appearance: Normal	  HEENT: Normal oral mucosa, PERRL, EOMI	  Lymphatic: No lymphadenopathy , no edema  Cardiovascular: Normal S1 S2, No JVD, No murmurs , Peripheral pulses palpable 2+ bilaterally  Respiratory: Lungs clear to auscultation, normal effort 	  Gastrointestinal:  Soft, Non-tender, + BS	  Skin: No rashes, No ecchymoses, No cyanosis, warm to touch  Musculoskeletal: Normal range of motion, normal strength  Psychiatry:  Mood & affect appropriate  Ext: No edema    LABS:    CARDIAC MARKERS:                        10.9   7.02  )-----------( 474      ( 24 Dec 2022 06:00 )             34.7     12-24    138  |  102  |  14  ----------------------------<  91  4.4   |  28  |  0.84    Ca    8.2<L>      24 Dec 2022 06:00  Phos  3.6     12-24  Mg     2.10     12-24    TPro  6.9  /  Alb  3.0<L>  /  TBili  0.6  /  DBili  x   /  AST  24  /  ALT  38  /  AlkPhos  139<H>  12-24    proBNP:   Lipid Profile:   HgA1c:   TSH:     TELEMETRY: 	    ECG:  	  RADIOLOGY:   DIAGNOSTIC TESTING:  [ ] Echocardiogram:  [ ]  Catheterization:  [ ] Stress Test:    OTHER:

## 2022-12-24 NOTE — PROGRESS NOTE ADULT - SUBJECTIVE AND OBJECTIVE BOX
[facility]   MD SIVA CLAUDIO MD RUORU WONG, PA    TEL:  FROM 9 AM to 5 PM ---OFFICE: [phone]    FROM 5 PM - 9 AM PLEASE CALL ANSWERING SERVICE: [phone]    RENAL FOLLOW UP NOTE--Date of Service 12-24-22 @ 10:38  --------------------------------------------------------------------------------  HPI:  Pt seen and examined at bedside.       PAST HISTORY  --------------------------------------------------------------------------------  No significant changes to PMH, PSH, FHx, SHx, unless otherwise noted    ALLERGIES & MEDICATIONS  --------------------------------------------------------------------------------  Allergies    No Known Allergies    Intolerances      Standing Inpatient Medications  apixaban 5 milliGRAM(s) Oral two times a day  aspirin enteric coated 81 milliGRAM(s) Oral daily  bisacodyl Suppository 10 milliGRAM(s) Rectal daily  chlorhexidine 2% Cloths 1 Application(s) Topical daily  dextrose 50% Injectable 25 Gram(s) IV Push once  dextrose 50% Injectable 12.5 Gram(s) IV Push once  dextrose 50% Injectable 25 Gram(s) IV Push once  dextrose Oral Gel 15 Gram(s) Oral once  diphtheria/tetanus/pertussis (acellular) Vaccine (Adacel) 0.5 milliLiter(s) IntraMuscular once  glucagon  Injectable 1 milliGRAM(s) IntraMuscular once  lacosamide IVPB 100 milliGRAM(s) IV Intermittent every 12 hours  levETIRAcetam  IVPB 1500 milliGRAM(s) IV Intermittent every 12 hours  lidocaine   4% Patch 1 Patch Transdermal every 24 hours  multivitamin 1 Tablet(s) Oral daily    PRN Inpatient Medications  ibuprofen  Tablet. 400 milliGRAM(s) Oral every 6 hours PRN  oxyCODONE    IR 5 milliGRAM(s) Oral every 6 hours PRN      REVIEW OF SYSTEMS  --------------------------------------------------------------------------------  General: no fever  MSK: no edema     VITALS/PHYSICAL EXAM  --------------------------------------------------------------------------------  T(C): 36.6 (12-24-22 @ 05:41), Max: 36.8 (12-23-22 @ 12:27)  HR: 86 (12-24-22 @ 05:41) (85 - 89)  BP: 119/89 (12-24-22 @ 05:41) (107/88 - 127/72)  RR: 18 (12-24-22 @ 05:41) (16 - 18)  SpO2: 100% (12-24-22 @ 05:41) (97% - 100%)  Wt(kg): --        Physical Exam:  	Gen: NAD  	HEENT: MMM  	Pulm: CTA B/L  	CV: S1S2  	Abd: Soft, +BS  	Ext: No LE edema B/L                      Neuro: Awake   	Skin: Warm and Dry   	Vascular access: NO HD catheter            no  fish  LABS/STUDIES  --------------------------------------------------------------------------------              10.9   7.02  >-----------<  474      [12-24-22 @ 06:00]              34.7     138  |  102  |  14  ----------------------------<  91      [12-24-22 @ 06:00]  4.4   |  28  |  0.84        Ca     8.2     [12-24-22 @ 06:00]      Mg     2.10     [12-24-22 @ 06:00]      Phos  3.6     [12-24-22 @ 06:00]    TPro  6.9  /  Alb  3.0  /  TBili  0.6  /  DBili  x   /  AST  24  /  ALT  38  /  AlkPhos  139  [12-24-22 @ 06:00]          Creatinine Trend:  SCr 0.84 [12-24 @ 06:00]  SCr 0.81 [12-23 @ 05:04]  SCr 0.83 [12-22 @ 06:40]  SCr 0.83 [12-21 @ 06:00]  SCr 0.71 [12-20 @ 06:40]    Urinalysis - [12-05-22 @ 21:00]      Color Yellow / Appearance Clear / SG 1.048 / pH 7.5      Gluc Negative / Ketone Moderate  / Bili Negative / Urobili <2 mg/dL       Blood Negative / Protein 100 mg/dL / Leuk Est Negative / Nitrite Negative      RBC 0 / WBC 1 / Hyaline  / Gran  / Sq Epi  / Non Sq Epi  / Bacteria     Urine Sodium 60      [12-19-22 @ 15:00]  Urine Osmolality 389      [12-19-22 @ 15:00]    Iron 49, TIBC 142, %sat 35      [12-14-22 @ 04:30]  Ferritin 692      [12-14-22 @ 04:30]  PTH -- (Ca --)      [12-18-22 @ 06:46]   27  Vitamin D (25OH) 44.1      [12-18-22 @ 06:46]  TSH 1.30      [12-05-22 @ 02:30]  Lipid: chol 131, , HDL 34, LDL --      [11-05-22 @ 06:57]

## 2022-12-24 NOTE — PROGRESS NOTE ADULT - ASSESSMENT
52-year-old male with a PMHx significant for TIAs (2011, 2013), CVAs x3 (02/2022 s/p tPA, 8/3/2022 s/p tPA, 11/5/2022 with residual expressive aphasia) on Eliquis, and HLD BIBEMS after being found unresponsive at home on the floor, last known well 12/1 at around noon. C-collar was placed. Patient was intubated in the ED for airway protection. CTH with no acute findings, vEEG with no identified seizures. Patient was weaned off pressors, extubated, and transitioned to floors 12/4/22. 12/5 am RRT called for seizure like activity with urinary incontinence, tongue biting, and R>L posturing, patient s/p ativan 2mg x3. Anesthesia called to RRT for intubation. MICU accepting patient for seizure like activity requiring intubation.      #AMS, Seizure like activity  EEG 12/7: concern for epileptiform activity; though EEG from 12/5 without such abnormalities.   - f/u neuro recs  - Patient found down and unresponsive at home on 12/2, last known well 12/1 at around noon. Intubated in ED 12/2, extubated in MICU 12/4  - RRT 12/5: seizure like acting with urinary incontinence, convulsions, and tongue biting; s/p ativan 2mg x3 with pauses in seizure activity following each; intubated for airway protection with rocuronium. Prolactin ~75   - CTH and CT cervical spine 12/2 negative for any acute pathologies. Tox screen on admission negative. 12/5: Stable exam from prior CT head, chronic microvascular ischemic changes, parenchymal loss, dystrophic calcification of central portion of alexis unchanged.  - Video EEG 12/3-12/4 without any seizure like activity, moderate to severe nonspecific diffuse or multifocal cerebral dysfunction  - c/w keppra 1500mg q12 maintenance, Keppra loaded 4.5g  - patient extubated again on 12/8th  -MRI noted, chronci CVA, no acute CVA noted         #CVAs/TIAs  - Hx of TIAs in 2011 and 2013, CVAs x3 in 02/22, 8/22, and 11/22 with residual expressive aphasia   - on Eliquis and Aspirin at home for hx of stroke  - PFO work up in past negative, no evidence of PFO on MIMI X2  - c/w ASA   - c/w heparin gtt, was on hold, resume eliquis   - Restart Statin when LFTs and CPK improve per neurology  - Being worked up for Mixed Connective Tissue Disease OP- f/u p-ANCA, c-ANCA/ [id number] negative  - Neuro recs appreciated    # Hypernatremia  tredn Na level   renal eval appreciated  S/P hydraiton, trend Na level     #Thecal sac flattening   - CT thoracic and lumbar spine showing degenerative disc disease T6-T7 through L4-L5 and mild disc bulges at L2-L3 through L4-L5 that flatten the ventral thecal sac and narrowing of the bilateral neural foramina  - Will Monitor for now, will consider neuro/neurosurg evaluation in the future    #HLD  - atorvastatin on hold due to elevated CK and LFTs  - restart when possible for secondary stroke prevention    #?ASD/PFO  - Patient reportedly found to have a PFO in February 2022 during a stroke workup at [place]. Patient presented for a PFO closure in November 2022. Notably, no PFO was found at the time and no intervention was performed by Dr. Lynn.    - TTE 11/5/22 EF 57% and grossly normal LV function  - MIMI performed bedside 12/5 1 of 2 studies (+) on bubble study (uploaded to [facility])        #Rhabdomyolysis  - CK 8720 on admission, peaked at 15k, now downtrending  - DDx: prolonged downtime myositis v hyperthermia? (T 105.4 F) v sepsis v seizure induced    - L Ankle adn Knee pain, swelling  S/P Tap last week  cont to have pain and tenderness and swelling  Repeat imaging   Ortho follow up         # Elevated Winston level  CHeck Abd US noted    GI eval PRN   Trend LFTs , normalized, no need for MRCP       #Partial SBO - resolved  - CT chest, abdomen, and pelvis w/ contrast concerning for possible partial SBO without transition point.  - Surgery consulted, no acute intervention at this time  - 12/5 KUB - negative for ileus or SBO  - Hold TF for 12/7 for possible trial of extubation.  - otherwise diet per nutrition      #Leukocytosis  - worsening leukocytosis  - monitor for fever  - Ortho eval fro knee swelling   - Pan Cx  - LP by IR , follow up results         #Concern for sepsis  Unclear source, aspiration v less likely meningitis   BCx (12/2 NGTD repeat 12/5 NGTD) and UC 12/2 NGTD  - Patient initially presented with AMS, T 105.4, tachycardic, and tachypneic   - UA negative  - s/p vanco and zosyn x1 in ED 12/2, ceftriaxone 2g BID 12/3-12/4, vanco 12/3-12/4  - 12/7 DCed Vanc since BCx from 12/5 NGTD  - c/w zosyn for aspiration pna presumed. completed course, monitor   - ID eval appreciated  - febrile again, Ortho for knee asp      # ANemia  noted, no gross bleeding  type and screen  GI eval

## 2022-12-24 NOTE — PROGRESS NOTE ADULT - SUBJECTIVE AND OBJECTIVE BOX
Name of Patient : TAMI DUNHAM  MRN: [id number]  Date of visit: 22       Subjective: Patient seen and examined. No new events except as noted.   doing okay     REVIEW OF SYSTEMS:    CONSTITUTIONAL: No weakness, fevers or chills  EYES/ENT: No visual changes;  No vertigo or throat pain   NECK: No pain or stiffness  RESPIRATORY: No cough, wheezing, hemoptysis; No shortness of breath  CARDIOVASCULAR: No chest pain or palpitations  GASTROINTESTINAL: No abdominal or epigastric pain. No nausea, vomiting, or hematemesis; No diarrhea or constipation. No melena or hematochezia.  GENITOURINARY: No dysuria, frequency or hematuria  NEUROLOGICAL: No numbness or weakness  SKIN: No itching, burning, rashes, or lesions   All other review of systems is negative unless indicated above.    MEDICATIONS:  MEDICATIONS  (STANDING):  apixaban 5 milliGRAM(s) Oral two times a day  aspirin enteric coated 81 milliGRAM(s) Oral daily  bisacodyl Suppository 10 milliGRAM(s) Rectal daily  cefTRIAXone   IVPB 1000 milliGRAM(s) IV Intermittent every 24 hours  chlorhexidine 2% Cloths 1 Application(s) Topical daily  dextrose 50% Injectable 25 Gram(s) IV Push once  dextrose 50% Injectable 12.5 Gram(s) IV Push once  dextrose 50% Injectable 25 Gram(s) IV Push once  dextrose Oral Gel 15 Gram(s) Oral once  diphtheria/tetanus/pertussis (acellular) Vaccine (Adacel) 0.5 milliLiter(s) IntraMuscular once  glucagon  Injectable 1 milliGRAM(s) IntraMuscular once  lacosamide IVPB 100 milliGRAM(s) IV Intermittent every 12 hours  levETIRAcetam  IVPB 1500 milliGRAM(s) IV Intermittent every 12 hours  lidocaine   4% Patch 1 Patch Transdermal every 24 hours  multivitamin 1 Tablet(s) Oral daily      PHYSICAL EXAM:  T(C): 36.6 (22 @ 22:02), Max: 36.6 (22 @ 05:41)  HR: 90 (22 @ 22:02) (85 - 90)  BP: 127/90 (22 @ 22:02) (119/85 - 127/90)  RR: 18 (22 @ 22:02) (17 - 18)  SpO2: 98% (22 @ 22:02) (98% - 100%)  Wt(kg): --  I&O's Summary        Appearance: Normal	  HEENT:  PERRLA   Lymphatic: No lymphadenopathy   Cardiovascular: Normal S1 S2, no JVD  Respiratory: normal effort , clear  Gastrointestinal:  Soft, Non-tender  Skin: No rashes,  warm to touch  Psychiatry:  Mood & affect appropriate  Musculuskeletal: No edema      All labs, Imaging and EKGs personally reviewed                           10.9   7  )-----------( 474      ( 24 Dec 2022 06:00 )             34.7               12-    138  |  102  |  14  ----------------------------<  91  4.4   |  28  |  0.84    Ca    8.2<L>      24 Dec 2022 06:00  Phos  3.6     12-24  Mg     2.10     12-24    TPro  6.9  /  Alb  3.0<L>  /  TBili  0.6  /  DBili  x   /  AST  24  /  ALT  38  /  AlkPhos  139<H>  12-24                       Urinalysis Basic - ( 24 Dec 2022 15:30 )    Color: Light Yellow / Appearance: Clear / S.007 / pH: x  Gluc: x / Ketone: Negative  / Bili: Negative / Urobili: <2 mg/dL   Blood: x / Protein: Trace / Nitrite: Negative   Leuk Esterase: Large / RBC: 2 /HPF / WBC 38 /HPF   Sq Epi: x / Non Sq Epi: 7 /HPF / Bacteria: Negative

## 2022-12-25 LAB
ALBUMIN SERPL ELPH-MCNC: 3 G/DL — LOW (ref 3.3–5)
ALP SERPL-CCNC: 140 U/L — HIGH (ref 40–120)
ALT FLD-CCNC: 36 U/L — SIGNIFICANT CHANGE UP (ref 4–41)
ANION GAP SERPL CALC-SCNC: 8 MMOL/L — SIGNIFICANT CHANGE UP (ref 7–14)
AST SERPL-CCNC: 25 U/L — SIGNIFICANT CHANGE UP (ref 4–40)
BASOPHILS # BLD AUTO: 0.04 K/UL — SIGNIFICANT CHANGE UP (ref 0–0.2)
BASOPHILS NFR BLD AUTO: 0.6 % — SIGNIFICANT CHANGE UP (ref 0–2)
BILIRUB SERPL-MCNC: 0.6 MG/DL — SIGNIFICANT CHANGE UP (ref 0.2–1.2)
BUN SERPL-MCNC: 13 MG/DL — SIGNIFICANT CHANGE UP (ref 7–23)
CALCIUM SERPL-MCNC: 8.5 MG/DL — SIGNIFICANT CHANGE UP (ref 8.4–10.5)
CHLORIDE SERPL-SCNC: 102 MMOL/L — SIGNIFICANT CHANGE UP (ref 98–107)
CO2 SERPL-SCNC: 27 MMOL/L — SIGNIFICANT CHANGE UP (ref 22–31)
CREAT SERPL-MCNC: 0.77 MG/DL — SIGNIFICANT CHANGE UP (ref 0.5–1.3)
EGFR: 108 ML/MIN/1.73M2 — SIGNIFICANT CHANGE UP
EOSINOPHIL # BLD AUTO: 0.18 K/UL — SIGNIFICANT CHANGE UP (ref 0–0.5)
EOSINOPHIL NFR BLD AUTO: 2.7 % — SIGNIFICANT CHANGE UP (ref 0–6)
GLUCOSE SERPL-MCNC: 100 MG/DL — HIGH (ref 70–99)
HCT VFR BLD CALC: 34.1 % — LOW (ref 39–50)
HGB BLD-MCNC: 10.8 G/DL — LOW (ref 13–17)
IANC: 4.29 K/UL — SIGNIFICANT CHANGE UP (ref 1.8–7.4)
IMM GRANULOCYTES NFR BLD AUTO: 2.1 % — HIGH (ref 0–0.9)
LYMPHOCYTES # BLD AUTO: 1.46 K/UL — SIGNIFICANT CHANGE UP (ref 1–3.3)
LYMPHOCYTES # BLD AUTO: 21.8 % — SIGNIFICANT CHANGE UP (ref 13–44)
MAGNESIUM SERPL-MCNC: 2 MG/DL — SIGNIFICANT CHANGE UP (ref 1.6–2.6)
MCHC RBC-ENTMCNC: 29 PG — SIGNIFICANT CHANGE UP (ref 27–34)
MCHC RBC-ENTMCNC: 31.7 GM/DL — LOW (ref 32–36)
MCV RBC AUTO: 91.4 FL — SIGNIFICANT CHANGE UP (ref 80–100)
MONOCYTES # BLD AUTO: 0.59 K/UL — SIGNIFICANT CHANGE UP (ref 0–0.9)
MONOCYTES NFR BLD AUTO: 8.8 % — SIGNIFICANT CHANGE UP (ref 2–14)
NEUTROPHILS # BLD AUTO: 4.29 K/UL — SIGNIFICANT CHANGE UP (ref 1.8–7.4)
NEUTROPHILS NFR BLD AUTO: 64 % — SIGNIFICANT CHANGE UP (ref 43–77)
NRBC # BLD: 0 /100 WBCS — SIGNIFICANT CHANGE UP (ref 0–0)
NRBC # FLD: 0 K/UL — SIGNIFICANT CHANGE UP (ref 0–0)
PHOSPHATE SERPL-MCNC: 3.5 MG/DL — SIGNIFICANT CHANGE UP (ref 2.5–4.5)
PLATELET # BLD AUTO: 465 K/UL — HIGH (ref 150–400)
POTASSIUM SERPL-MCNC: 4.1 MMOL/L — SIGNIFICANT CHANGE UP (ref 3.5–5.3)
POTASSIUM SERPL-SCNC: 4.1 MMOL/L — SIGNIFICANT CHANGE UP (ref 3.5–5.3)
PROT SERPL-MCNC: 7.2 G/DL — SIGNIFICANT CHANGE UP (ref 6–8.3)
RBC # BLD: 3.73 M/UL — LOW (ref 4.2–5.8)
RBC # FLD: 14.4 % — SIGNIFICANT CHANGE UP (ref 10.3–14.5)
SODIUM SERPL-SCNC: 137 MMOL/L — SIGNIFICANT CHANGE UP (ref 135–145)
WBC # BLD: 6.7 K/UL — SIGNIFICANT CHANGE UP (ref 3.8–10.5)
WBC # FLD AUTO: 6.7 K/UL — SIGNIFICANT CHANGE UP (ref 3.8–10.5)

## 2022-12-25 RX ADMIN — LIDOCAINE 1 PATCH: 4 CREAM TOPICAL at 18:25

## 2022-12-25 RX ADMIN — OXYCODONE HYDROCHLORIDE 5 MILLIGRAM(S): 5 TABLET ORAL at 05:51

## 2022-12-25 RX ADMIN — OXYCODONE HYDROCHLORIDE 5 MILLIGRAM(S): 5 TABLET ORAL at 11:57

## 2022-12-25 RX ADMIN — CEFTRIAXONE 100 MILLIGRAM(S): 500 INJECTION, POWDER, FOR SOLUTION INTRAMUSCULAR; INTRAVENOUS at 18:30

## 2022-12-25 RX ADMIN — CHLORHEXIDINE GLUCONATE 1 APPLICATION(S): 213 SOLUTION TOPICAL at 11:22

## 2022-12-25 RX ADMIN — LEVETIRACETAM 400 MILLIGRAM(S): 250 TABLET, FILM COATED ORAL at 07:00

## 2022-12-25 RX ADMIN — OXYCODONE HYDROCHLORIDE 5 MILLIGRAM(S): 5 TABLET ORAL at 12:56

## 2022-12-25 RX ADMIN — LACOSAMIDE 120 MILLIGRAM(S): 50 TABLET ORAL at 05:47

## 2022-12-25 RX ADMIN — APIXABAN 5 MILLIGRAM(S): 2.5 TABLET, FILM COATED ORAL at 18:24

## 2022-12-25 RX ADMIN — Medication 81 MILLIGRAM(S): at 11:26

## 2022-12-25 RX ADMIN — LACOSAMIDE 120 MILLIGRAM(S): 50 TABLET ORAL at 18:24

## 2022-12-25 RX ADMIN — Medication 1 TABLET(S): at 11:26

## 2022-12-25 RX ADMIN — APIXABAN 5 MILLIGRAM(S): 2.5 TABLET, FILM COATED ORAL at 05:50

## 2022-12-25 RX ADMIN — LEVETIRACETAM 400 MILLIGRAM(S): 250 TABLET, FILM COATED ORAL at 18:24

## 2022-12-25 NOTE — PROGRESS NOTE ADULT - SUBJECTIVE AND OBJECTIVE BOX
Name of Patient : TAMI DUNHAM  MRN: [id number]  Date of visit: 22       Subjective: Patient seen and examined. No new events except as noted.   Doing okay       REVIEW OF SYSTEMS:    CONSTITUTIONAL: No weakness, fevers or chills  EYES/ENT: No visual changes;  No vertigo or throat pain   NECK: No pain or stiffness  RESPIRATORY: No cough, wheezing, hemoptysis; No shortness of breath  CARDIOVASCULAR: No chest pain or palpitations  GASTROINTESTINAL: No abdominal or epigastric pain. No nausea, vomiting, or hematemesis; No diarrhea or constipation. No melena or hematochezia.  GENITOURINARY: No dysuria, frequency or hematuria  NEUROLOGICAL: No numbness or weakness  SKIN: No itching, burning, rashes, or lesions   All other review of systems is negative unless indicated above.    MEDICATIONS:  MEDICATIONS  (STANDING):  apixaban 5 milliGRAM(s) Oral two times a day  aspirin enteric coated 81 milliGRAM(s) Oral daily  bisacodyl Suppository 10 milliGRAM(s) Rectal daily  cefTRIAXone   IVPB 1000 milliGRAM(s) IV Intermittent every 24 hours  chlorhexidine 2% Cloths 1 Application(s) Topical daily  dextrose 50% Injectable 25 Gram(s) IV Push once  dextrose 50% Injectable 12.5 Gram(s) IV Push once  dextrose 50% Injectable 25 Gram(s) IV Push once  dextrose Oral Gel 15 Gram(s) Oral once  diphtheria/tetanus/pertussis (acellular) Vaccine (Adacel) 0.5 milliLiter(s) IntraMuscular once  glucagon  Injectable 1 milliGRAM(s) IntraMuscular once  lacosamide IVPB 100 milliGRAM(s) IV Intermittent every 12 hours  levETIRAcetam  IVPB 1500 milliGRAM(s) IV Intermittent every 12 hours  lidocaine   4% Patch 1 Patch Transdermal every 24 hours  multivitamin 1 Tablet(s) Oral daily      PHYSICAL EXAM:  T(C): 36.5 (22 @ 18:30), Max: 36.6 (22 @ 22:02)  HR: 86 (22 @ 18:30) (84 - 90)  BP: 118/79 (22 @ 18:30) (118/79 - 127/90)  RR: 18 (22 @ 18:30) (18 - 18)  SpO2: 99% (22 @ 18:30) (98% - 99%)  Wt(kg): --  I&O's Summary        Appearance: Normal	  HEENT:  PERRLA   Lymphatic: No lymphadenopathy   Cardiovascular: Normal S1 S2, no JVD  Respiratory: normal effort , clear  Gastrointestinal:  Soft, Non-tender  Skin: No rashes,  warm to touch  Psychiatry:  Mood & affect appropriate  Musculuskeletal: LE dressing       All labs, Imaging and EKGs personally reviewed                           10.8   6.70  )-----------( 465      ( 25 Dec 2022 06:12 )             34.1                   137  |  102  |  13  ----------------------------<  100<H>  4.1   |  27  |  0.77    Ca    8.5      25 Dec 2022 06:12  Phos  3.5       Mg     2.00         TPro  7.2  /  Alb  3.0<L>  /  TBili  0.6  /  DBili  x   /  AST  25  /  ALT  36  /  AlkPhos  140<H>  12-25                       Urinalysis Basic - ( 24 Dec 2022 15:30 )    Color: Light Yellow / Appearance: Clear / S.007 / pH: x  Gluc: x / Ketone: Negative  / Bili: Negative / Urobili: <2 mg/dL   Blood: x / Protein: Trace / Nitrite: Negative   Leuk Esterase: Large / RBC: 2 /HPF / WBC 38 /HPF   Sq Epi: x / Non Sq Epi: 7 /HPF / Bacteria: Negative

## 2022-12-25 NOTE — PROGRESS NOTE ADULT - SUBJECTIVE AND OBJECTIVE BOX
Subjective: Patient seen and examined. No new events except as noted.     REVIEW OF SYSTEMS:    CONSTITUTIONAL:+ weakness, fevers or chills  EYES/ENT: No visual changes;  No vertigo or throat pain   NECK: No pain or stiffness  RESPIRATORY: No cough, wheezing, hemoptysis; No shortness of breath  CARDIOVASCULAR: No chest pain or palpitations  GASTROINTESTINAL: No abdominal or epigastric pain. No nausea, vomiting, or hematemesis; No diarrhea or constipation. No melena or hematochezia.  GENITOURINARY: No dysuria, frequency or hematuria  NEUROLOGICAL: No numbness or weakness  SKIN: No itching, burning, rashes, or lesions   All other review of systems is negative unless indicated above.    MEDICATIONS:  MEDICATIONS  (STANDING):  apixaban 5 milliGRAM(s) Oral two times a day  aspirin enteric coated 81 milliGRAM(s) Oral daily  bisacodyl Suppository 10 milliGRAM(s) Rectal daily  cefTRIAXone   IVPB 1000 milliGRAM(s) IV Intermittent every 24 hours  chlorhexidine 2% Cloths 1 Application(s) Topical daily  dextrose 50% Injectable 25 Gram(s) IV Push once  dextrose 50% Injectable 12.5 Gram(s) IV Push once  dextrose 50% Injectable 25 Gram(s) IV Push once  dextrose Oral Gel 15 Gram(s) Oral once  diphtheria/tetanus/pertussis (acellular) Vaccine (Adacel) 0.5 milliLiter(s) IntraMuscular once  glucagon  Injectable 1 milliGRAM(s) IntraMuscular once  lacosamide IVPB 100 milliGRAM(s) IV Intermittent every 12 hours  levETIRAcetam  IVPB 1500 milliGRAM(s) IV Intermittent every 12 hours  lidocaine   4% Patch 1 Patch Transdermal every 24 hours  multivitamin 1 Tablet(s) Oral daily      PHYSICAL EXAM:  T(C): 36.5 (12-25-22 @ 05:19), Max: 36.6 (12-24-22 @ 22:02)  HR: 84 (12-25-22 @ 05:19) (84 - 90)  BP: 127/88 (12-25-22 @ 05:19) (123/89 - 127/90)  RR: 18 (12-25-22 @ 05:19) (17 - 18)  SpO2: 98% (12-25-22 @ 05:19) (98% - 99%)  Wt(kg): --  I&O's Summary        Appearance: Normal	  HEENT:   Normal oral mucosa, PERRL, EOMI	  Lymphatic: No lymphadenopathy , no edema  Cardiovascular: Normal S1 S2, No JVD, No murmurs , Peripheral pulses palpable 2+ bilaterally  Respiratory: Lungs clear to auscultation, normal effort 	  Gastrointestinal:  Soft, Non-tender, + BS	  Skin: No rashes, No ecchymoses, No cyanosis, warm to touch  Musculoskeletal: Normal range of motion, normal strength  Psychiatry:  Mood & affect appropriate  Ext: No edema      LABS:    CARDIAC MARKERS:                                10.8   6.70  )-----------( 465      ( 25 Dec 2022 06:12 )             34.1     12-25    137  |  102  |  13  ----------------------------<  100<H>  4.1   |  27  |  0.77    Ca    8.5      25 Dec 2022 06:12  Phos  3.5     12-25  Mg     2.00     12-25    TPro  7.2  /  Alb  3.0<L>  /  TBili  0.6  /  DBili  x   /  AST  25  /  ALT  36  /  AlkPhos  140<H>  12-25    proBNP:   Lipid Profile:   HgA1c:   TSH:     2          TELEMETRY: 	    ECG:  	  RADIOLOGY:   DIAGNOSTIC TESTING:  [ ] Echocardiogram:  [ ]  Catheterization:  [ ] Stress Test:    OTHER:

## 2022-12-25 NOTE — PROGRESS NOTE ADULT - ASSESSMENT
52-year-old male with a PMHx significant for TIAs (2011, 2013), CVAs x3 (02/2022 s/p tPA, 8/3/2022 s/p tPA, 11/5/2022 with residual expressive aphasia) on Eliquis, and HLD BIBEMS after being found unresponsive at home on the floor, last known well 12/1 at around noon. C-collar was placed. Patient was intubated in the ED for airway protection. CTH with no acute findings, vEEG with no identified seizures. Patient was weaned off pressors, extubated, and transitioned to floors 12/4/22. 12/5 am RRT called for seizure like activity with urinary incontinence, tongue biting, and R>L posturing, patient s/p ativan 2mg x3. Anesthesia called to RRT for intubation. MICU accepting patient for seizure like activity requiring intubation.      #AMS, Seizure like activity  EEG 12/7: concern for epileptiform activity; though EEG from 12/5 without such abnormalities.   - f/u neuro recs  - Patient found down and unresponsive at home on 12/2, last known well 12/1 at around noon. Intubated in ED 12/2, extubated in MICU 12/4  - RRT 12/5: seizure like acting with urinary incontinence, convulsions, and tongue biting; s/p ativan 2mg x3 with pauses in seizure activity following each; intubated for airway protection with rocuronium. Prolactin ~75   - CTH and CT cervical spine 12/2 negative for any acute pathologies. Tox screen on admission negative. 12/5: Stable exam from prior CT head, chronic microvascular ischemic changes, parenchymal loss, dystrophic calcification of central portion of alexis unchanged.  - Video EEG 12/3-12/4 without any seizure like activity, moderate to severe nonspecific diffuse or multifocal cerebral dysfunction  - c/w keppra 1500mg q12 maintenance, Keppra loaded 4.5g  - patient extubated again on 12/8th  -MRI noted, chronci CVA, no acute CVA noted         #CVAs/TIAs  - Hx of TIAs in 2011 and 2013, CVAs x3 in 02/22, 8/22, and 11/22 with residual expressive aphasia   - on Eliquis and Aspirin at home for hx of stroke  - PFO work up in past negative, no evidence of PFO on MIMI X2  - c/w ASA   - c/w heparin gtt, was on hold, resume eliquis   - Restart Statin when LFTs and CPK improve per neurology  - Being worked up for Mixed Connective Tissue Disease OP- f/u p-ANCA, c-ANCA/ [id number] negative  - Neuro recs appreciated    # Hypernatremia  tredn Na level   renal eval appreciated  S/P hydraiton, trend Na level     #Thecal sac flattening   - CT thoracic and lumbar spine showing degenerative disc disease T6-T7 through L4-L5 and mild disc bulges at L2-L3 through L4-L5 that flatten the ventral thecal sac and narrowing of the bilateral neural foramina  - Will Monitor for now, will consider neuro/neurosurg evaluation in the future    #HLD  - atorvastatin on hold due to elevated CK and LFTs  - restart when possible for secondary stroke prevention    #?ASD/PFO  - Patient reportedly found to have a PFO in February 2022 during a stroke workup at [place]. Patient presented for a PFO closure in November 2022. Notably, no PFO was found at the time and no intervention was performed by Dr. Lynn.    - TTE 11/5/22 EF 57% and grossly normal LV function  - MIMI performed bedside 12/5 1 of 2 studies (+) on bubble study (uploaded to [facility])        #Rhabdomyolysis  - CK 8720 on admission, peaked at 15k, now downtrending  - DDx: prolonged downtime myositis v hyperthermia? (T 105.4 F) v sepsis v seizure induced    - L Ankle adn Knee pain, swelling  S/P Tap last week  cont to have pain and tenderness and swelling  Repeat imaging   Ortho follow up         # Elevated Winston level  CHeck Abd US noted    GI eval PRN   Trend LFTs , normalized, no need for MRCP       #Partial SBO - resolved  - CT chest, abdomen, and pelvis w/ contrast concerning for possible partial SBO without transition point.  - Surgery consulted, no acute intervention at this time  - 12/5 KUB - negative for ileus or SBO  - Hold TF for 12/7 for possible trial of extubation.  - otherwise diet per nutrition      #Leukocytosis  - worsening leukocytosis  - monitor for fever  - Ortho eval fro knee swelling   - Pan Cx  - LP by IR , follow up results         #Concern for sepsis  Unclear source, aspiration v less likely meningitis   BCx (12/2 NGTD repeat 12/5 NGTD) and UC 12/2 NGTD  - Patient initially presented with AMS, T 105.4, tachycardic, and tachypneic   - UA negative  - s/p vanco and zosyn x1 in ED 12/2, ceftriaxone 2g BID 12/3-12/4, vanco 12/3-12/4  - 12/7 DCed Vanc since BCx from 12/5 NGTD  - c/w zosyn for aspiration pna presumed. completed course, monitor   - ID eval appreciated  - febrile again, Ortho for knee asp      # ANemia  noted, no gross bleeding  type and screen  GI eval

## 2022-12-25 NOTE — PROGRESS NOTE ADULT - SUBJECTIVE AND OBJECTIVE BOX
[facility] [facility]   MD SIVA CLAUDIO MD RUORU WONG, PA    TEL:  FROM 9 AM to 5 PM ---OFFICE: [phone]    FROM 5 PM - 9 AM PLEASE CALL ANSWERING SERVICE: [phone]    RENAL FOLLOW UP NOTE--Date of Service 12-25-22 @ 10:21  --------------------------------------------------------------------------------  HPI:  Pt seen and examined at bedside.       PAST HISTORY  --------------------------------------------------------------------------------  No significant changes to PMH, PSH, FHx, SHx, unless otherwise noted    ALLERGIES & MEDICATIONS  --------------------------------------------------------------------------------  Allergies    No Known Allergies    Intolerances      Standing Inpatient Medications  apixaban 5 milliGRAM(s) Oral two times a day  aspirin enteric coated 81 milliGRAM(s) Oral daily  bisacodyl Suppository 10 milliGRAM(s) Rectal daily  cefTRIAXone   IVPB 1000 milliGRAM(s) IV Intermittent every 24 hours  chlorhexidine 2% Cloths 1 Application(s) Topical daily  dextrose 50% Injectable 25 Gram(s) IV Push once  dextrose 50% Injectable 12.5 Gram(s) IV Push once  dextrose 50% Injectable 25 Gram(s) IV Push once  dextrose Oral Gel 15 Gram(s) Oral once  diphtheria/tetanus/pertussis (acellular) Vaccine (Adacel) 0.5 milliLiter(s) IntraMuscular once  glucagon  Injectable 1 milliGRAM(s) IntraMuscular once  lacosamide IVPB 100 milliGRAM(s) IV Intermittent every 12 hours  levETIRAcetam  IVPB 1500 milliGRAM(s) IV Intermittent every 12 hours  lidocaine   4% Patch 1 Patch Transdermal every 24 hours  multivitamin 1 Tablet(s) Oral daily    PRN Inpatient Medications  ibuprofen  Tablet. 400 milliGRAM(s) Oral every 6 hours PRN  oxyCODONE    IR 5 milliGRAM(s) Oral every 6 hours PRN      REVIEW OF SYSTEMS  --------------------------------------------------------------------------------  General: no fever  MSK: no edema     VITALS/PHYSICAL EXAM  --------------------------------------------------------------------------------  T(C): 36.5 (12-25-22 @ 05:19), Max: 36.6 (12-24-22 @ 22:02)  HR: 84 (12-25-22 @ 05:19) (84 - 90)  BP: 127/88 (12-25-22 @ 05:19) (123/89 - 127/90)  RR: 18 (12-25-22 @ 05:19) (17 - 18)  SpO2: 98% (12-25-22 @ 05:19) (98% - 99%)  Wt(kg): --        Physical Exam:  	Gen: NAD  	HEENT: MMM  	Pulm: CTA B/L  	CV: S1S2  	Abd: Soft, +BS  	Ext: No LE edema B/L                      Neuro: Awake   	Skin: Warm and Dry   	Vascular access: NO HD catheter             no fish  LABS/STUDIES  --------------------------------------------------------------------------------              10.8   6.70  >-----------<  465      [12-25-22 @ 06:12]              34.1     137  |  102  |  13  ----------------------------<  100      [12-25-22 @ 06:12]  4.1   |  27  |  0.77        Ca     8.5     [12-25-22 @ 06:12]      Mg     2.00     [12-25-22 @ 06:12]      Phos  3.5     [12-25-22 @ 06:12]    TPro  7.2  /  Alb  3.0  /  TBili  0.6  /  DBili  x   /  AST  25  /  ALT  36  /  AlkPhos  140  [12-25-22 @ 06:12]          Creatinine Trend:  SCr 0.77 [12-25 @ 06:12]  SCr 0.84 [12-24 @ 06:00]  SCr 0.81 [12-23 @ 05:04]  SCr 0.83 [12-22 @ 06:40]  SCr 0.83 [12-21 @ 06:00]    Urinalysis - [12-24-22 @ 15:30]      Color Light Yellow / Appearance Clear / SG 1.007 / pH 6.5      Gluc Negative / Ketone Negative  / Bili Negative / Urobili <2 mg/dL       Blood Negative / Protein Trace / Leuk Est Large / Nitrite Negative      RBC 2 / WBC 38 / Hyaline 2 / Gran  / Sq Epi  / Non Sq Epi 7 / Bacteria Negative    Urine Sodium 60      [12-19-22 @ 15:00]  Urine Osmolality 389      [12-19-22 @ 15:00]    Iron 49, TIBC 142, %sat 35      [12-14-22 @ 04:30]  Ferritin 692      [12-14-22 @ 04:30]  PTH -- (Ca --)      [12-18-22 @ 06:46]   27  Vitamin D (25OH) 44.1      [12-18-22 @ 06:46]  TSH 1.30      [12-05-22 @ 02:30]  Lipid: chol 131, , HDL 34, LDL --      [11-05-22 @ 06:57]

## 2022-12-26 LAB
ALBUMIN SERPL ELPH-MCNC: 3 G/DL — LOW (ref 3.3–5)
ALP SERPL-CCNC: 132 U/L — HIGH (ref 40–120)
ALT FLD-CCNC: 32 U/L — SIGNIFICANT CHANGE UP (ref 4–41)
ANION GAP SERPL CALC-SCNC: 8 MMOL/L — SIGNIFICANT CHANGE UP (ref 7–14)
APPEARANCE UR: CLEAR — SIGNIFICANT CHANGE UP
AST SERPL-CCNC: 20 U/L — SIGNIFICANT CHANGE UP (ref 4–40)
BASOPHILS # BLD AUTO: 0.05 K/UL — SIGNIFICANT CHANGE UP (ref 0–0.2)
BASOPHILS NFR BLD AUTO: 0.7 % — SIGNIFICANT CHANGE UP (ref 0–2)
BILIRUB SERPL-MCNC: 0.5 MG/DL — SIGNIFICANT CHANGE UP (ref 0.2–1.2)
BILIRUB UR-MCNC: NEGATIVE — SIGNIFICANT CHANGE UP
BUN SERPL-MCNC: 14 MG/DL — SIGNIFICANT CHANGE UP (ref 7–23)
CALCIUM SERPL-MCNC: 8.4 MG/DL — SIGNIFICANT CHANGE UP (ref 8.4–10.5)
CHLORIDE SERPL-SCNC: 103 MMOL/L — SIGNIFICANT CHANGE UP (ref 98–107)
CO2 SERPL-SCNC: 27 MMOL/L — SIGNIFICANT CHANGE UP (ref 22–31)
COLOR SPEC: SIGNIFICANT CHANGE UP
CREAT SERPL-MCNC: 0.74 MG/DL — SIGNIFICANT CHANGE UP (ref 0.5–1.3)
CULTURE RESULTS: SIGNIFICANT CHANGE UP
DIFF PNL FLD: NEGATIVE — SIGNIFICANT CHANGE UP
EGFR: 109 ML/MIN/1.73M2 — SIGNIFICANT CHANGE UP
EOSINOPHIL # BLD AUTO: 0.19 K/UL — SIGNIFICANT CHANGE UP (ref 0–0.5)
EOSINOPHIL NFR BLD AUTO: 2.8 % — SIGNIFICANT CHANGE UP (ref 0–6)
GLUCOSE SERPL-MCNC: 107 MG/DL — HIGH (ref 70–99)
GLUCOSE UR QL: NEGATIVE — SIGNIFICANT CHANGE UP
HCT VFR BLD CALC: 34.4 % — LOW (ref 39–50)
HGB BLD-MCNC: 11.2 G/DL — LOW (ref 13–17)
IANC: 4.17 K/UL — SIGNIFICANT CHANGE UP (ref 1.8–7.4)
IMM GRANULOCYTES NFR BLD AUTO: 2.4 % — HIGH (ref 0–0.9)
KETONES UR-MCNC: NEGATIVE — SIGNIFICANT CHANGE UP
LEUKOCYTE ESTERASE UR-ACNC: NEGATIVE — SIGNIFICANT CHANGE UP
LYMPHOCYTES # BLD AUTO: 1.55 K/UL — SIGNIFICANT CHANGE UP (ref 1–3.3)
LYMPHOCYTES # BLD AUTO: 23.1 % — SIGNIFICANT CHANGE UP (ref 13–44)
MAGNESIUM SERPL-MCNC: 2 MG/DL — SIGNIFICANT CHANGE UP (ref 1.6–2.6)
MCHC RBC-ENTMCNC: 29.7 PG — SIGNIFICANT CHANGE UP (ref 27–34)
MCHC RBC-ENTMCNC: 32.6 GM/DL — SIGNIFICANT CHANGE UP (ref 32–36)
MCV RBC AUTO: 91.2 FL — SIGNIFICANT CHANGE UP (ref 80–100)
MONOCYTES # BLD AUTO: 0.59 K/UL — SIGNIFICANT CHANGE UP (ref 0–0.9)
MONOCYTES NFR BLD AUTO: 8.8 % — SIGNIFICANT CHANGE UP (ref 2–14)
NEUTROPHILS # BLD AUTO: 4.17 K/UL — SIGNIFICANT CHANGE UP (ref 1.8–7.4)
NEUTROPHILS NFR BLD AUTO: 62.2 % — SIGNIFICANT CHANGE UP (ref 43–77)
NITRITE UR-MCNC: NEGATIVE — SIGNIFICANT CHANGE UP
NRBC # BLD: 0 /100 WBCS — SIGNIFICANT CHANGE UP (ref 0–0)
NRBC # FLD: 0 K/UL — SIGNIFICANT CHANGE UP (ref 0–0)
PH UR: 7 — SIGNIFICANT CHANGE UP (ref 5–8)
PHOSPHATE SERPL-MCNC: 3.5 MG/DL — SIGNIFICANT CHANGE UP (ref 2.5–4.5)
PLATELET # BLD AUTO: 423 K/UL — HIGH (ref 150–400)
POTASSIUM SERPL-MCNC: 4.3 MMOL/L — SIGNIFICANT CHANGE UP (ref 3.5–5.3)
POTASSIUM SERPL-SCNC: 4.3 MMOL/L — SIGNIFICANT CHANGE UP (ref 3.5–5.3)
PROT SERPL-MCNC: 7 G/DL — SIGNIFICANT CHANGE UP (ref 6–8.3)
PROT UR-MCNC: NEGATIVE — SIGNIFICANT CHANGE UP
RBC # BLD: 3.77 M/UL — LOW (ref 4.2–5.8)
RBC # FLD: 14.3 % — SIGNIFICANT CHANGE UP (ref 10.3–14.5)
SARS-COV-2 RNA SPEC QL NAA+PROBE: DETECTED
SODIUM SERPL-SCNC: 138 MMOL/L — SIGNIFICANT CHANGE UP (ref 135–145)
SP GR SPEC: 1.01 — SIGNIFICANT CHANGE UP (ref 1.01–1.05)
SPECIMEN SOURCE: SIGNIFICANT CHANGE UP
UROBILINOGEN FLD QL: SIGNIFICANT CHANGE UP
WBC # BLD: 6.71 K/UL — SIGNIFICANT CHANGE UP (ref 3.8–10.5)
WBC # FLD AUTO: 6.71 K/UL — SIGNIFICANT CHANGE UP (ref 3.8–10.5)

## 2022-12-26 RX ORDER — PHENAZOPYRIDINE HCL 100 MG
100 TABLET ORAL EVERY 8 HOURS
Refills: 0 | Status: DISCONTINUED | OUTPATIENT
Start: 2022-12-26 | End: 2023-01-09

## 2022-12-26 RX ADMIN — CEFTRIAXONE 100 MILLIGRAM(S): 500 INJECTION, POWDER, FOR SOLUTION INTRAMUSCULAR; INTRAVENOUS at 18:21

## 2022-12-26 RX ADMIN — LIDOCAINE 1 PATCH: 4 CREAM TOPICAL at 17:25

## 2022-12-26 RX ADMIN — LACOSAMIDE 120 MILLIGRAM(S): 50 TABLET ORAL at 06:21

## 2022-12-26 RX ADMIN — LIDOCAINE 1 PATCH: 4 CREAM TOPICAL at 07:31

## 2022-12-26 RX ADMIN — Medication 10 MILLIGRAM(S): at 14:27

## 2022-12-26 RX ADMIN — OXYCODONE HYDROCHLORIDE 5 MILLIGRAM(S): 5 TABLET ORAL at 06:20

## 2022-12-26 RX ADMIN — LACOSAMIDE 120 MILLIGRAM(S): 50 TABLET ORAL at 17:49

## 2022-12-26 RX ADMIN — LEVETIRACETAM 400 MILLIGRAM(S): 250 TABLET, FILM COATED ORAL at 07:22

## 2022-12-26 RX ADMIN — APIXABAN 5 MILLIGRAM(S): 2.5 TABLET, FILM COATED ORAL at 17:26

## 2022-12-26 RX ADMIN — Medication 1 TABLET(S): at 12:07

## 2022-12-26 RX ADMIN — LEVETIRACETAM 400 MILLIGRAM(S): 250 TABLET, FILM COATED ORAL at 17:25

## 2022-12-26 RX ADMIN — APIXABAN 5 MILLIGRAM(S): 2.5 TABLET, FILM COATED ORAL at 06:21

## 2022-12-26 RX ADMIN — OXYCODONE HYDROCHLORIDE 5 MILLIGRAM(S): 5 TABLET ORAL at 07:31

## 2022-12-26 RX ADMIN — Medication 81 MILLIGRAM(S): at 12:07

## 2022-12-26 RX ADMIN — CHLORHEXIDINE GLUCONATE 1 APPLICATION(S): 213 SOLUTION TOPICAL at 12:07

## 2022-12-26 NOTE — PROGRESS NOTE ADULT - ASSESSMENT
52-year-old male with a PMHx significant for TIAs (2011, 2013), CVAs x3 (02/2022 s/p tPA, 8/3/2022 s/p tPA, 11/5/2022 with residual expressive aphasia) on Eliquis, and HLD BIBEMS after being found unresponsive at home on the floor, last known well 12/1 at around noon. C-collar was placed. Patient was intubated in the ED for airway protection. CTH with no acute findings, vEEG with no identified seizures. Patient was weaned off pressors, extubated, and transitioned to floors 12/4/22. 12/5 am RRT called for seizure like activity with urinary incontinence, tongue biting, and R>L posturing, patient s/p ativan 2mg x3. Anesthesia called to RRT for intubation. MICU accepting patient for seizure like activity requiring intubation.      #AMS, Seizure like activity  EEG 12/7: concern for epileptiform activity; though EEG from 12/5 without such abnormalities.   - f/u neuro recs  - Patient found down and unresponsive at home on 12/2, last known well 12/1 at around noon. Intubated in ED 12/2, extubated in MICU 12/4  - RRT 12/5: seizure like acting with urinary incontinence, convulsions, and tongue biting; s/p ativan 2mg x3 with pauses in seizure activity following each; intubated for airway protection with rocuronium. Prolactin ~75   - CTH and CT cervical spine 12/2 negative for any acute pathologies. Tox screen on admission negative. 12/5: Stable exam from prior CT head, chronic microvascular ischemic changes, parenchymal loss, dystrophic calcification of central portion of alexis unchanged.  - Video EEG 12/3-12/4 without any seizure like activity, moderate to severe nonspecific diffuse or multifocal cerebral dysfunction  - c/w keppra 1500mg q12 maintenance, Keppra loaded 4.5g  - patient extubated again on 12/8th  -MRI noted, chronci CVA, no acute CVA noted         #CVAs/TIAs  - Hx of TIAs in 2011 and 2013, CVAs x3 in 02/22, 8/22, and 11/22 with residual expressive aphasia   - on Eliquis and Aspirin at home for hx of stroke  - PFO work up in past negative, no evidence of PFO on MIMI X2  - c/w ASA   - c/w heparin gtt, was on hold, resume eliquis   - Restart Statin when LFTs and CPK improve per neurology  - Being worked up for Mixed Connective Tissue Disease OP- f/u p-ANCA, c-ANCA/ [id number] negative  - Neuro recs appreciated    # Hypernatremia  tredn Na level   renal eval appreciated  S/P hydraiton, trend Na level     #Thecal sac flattening   - CT thoracic and lumbar spine showing degenerative disc disease T6-T7 through L4-L5 and mild disc bulges at L2-L3 through L4-L5 that flatten the ventral thecal sac and narrowing of the bilateral neural foramina  - Will Monitor for now, will consider neuro/neurosurg evaluation in the future    #HLD  - atorvastatin on hold due to elevated CK and LFTs  - restart when possible for secondary stroke prevention    #?ASD/PFO  - Patient reportedly found to have a PFO in February 2022 during a stroke workup at [facility]. Patient presented for a PFO closure in November 2022. Notably, no PFO was found at the time and no intervention was performed by Dr. Lynn.    - TTE 11/5/22 EF 57% and grossly normal LV function  - MIMI performed bedside 12/5 1 of 2 studies (+) on bubble study (uploaded to [employer])        #Rhabdomyolysis  - CK 8720 on admission, peaked at 15k, now downtrending  - DDx: prolonged downtime myositis v hyperthermia? (T 105.4 F) v sepsis v seizure induced    - L Ankle adn Knee pain, swelling  S/P Tap last week  cont to have pain and tenderness and swelling  Repeat imaging   Ortho follow up         # Elevated Winston level  CHeck Abd US noted    GI eval PRN   Trend LFTs , normalized, no need for MRCP       #Partial SBO - resolved  - CT chest, abdomen, and pelvis w/ contrast concerning for possible partial SBO without transition point.  - Surgery consulted, no acute intervention at this time  - 12/5 KUB - negative for ileus or SBO  - Hold TF for 12/7 for possible trial of extubation.  - otherwise diet per nutrition      #Leukocytosis  - worsening leukocytosis  - monitor for fever  - Ortho eval fro knee swelling   - Pan Cx  - LP by IR , follow up results         #Concern for sepsis  Unclear source, aspiration v less likely meningitis   BCx (12/2 NGTD repeat 12/5 NGTD) and UC 12/2 NGTD  - Patient initially presented with AMS, T 105.4, tachycardic, and tachypneic   - UA negative  - s/p vanco and zosyn x1 in ED 12/2, ceftriaxone 2g BID 12/3-12/4, vanco 12/3-12/4  - 12/7 DCed Vanc since BCx from 12/5 NGTD  - c/w zosyn for aspiration pna presumed. completed course, monitor   - ID eval appreciated  - febrile again, Ortho for knee asp      # ANemia  noted, no gross bleeding  type and screen  GI eval

## 2022-12-26 NOTE — PROGRESS NOTE ADULT - SUBJECTIVE AND OBJECTIVE BOX
Name of Patient : TAMI DUNHAM  MRN: [id number]  Date of visit: 22       Subjective: Patient seen and examined. No new events except as noted.   calm    REVIEW OF SYSTEMS:    CONSTITUTIONAL: No weakness, fevers or chills  EYES/ENT: No visual changes;  No vertigo or throat pain   NECK: No pain or stiffness  RESPIRATORY: No cough, wheezing, hemoptysis; No shortness of breath  CARDIOVASCULAR: No chest pain or palpitations  GASTROINTESTINAL: No abdominal or epigastric pain. No nausea, vomiting, or hematemesis; No diarrhea or constipation. No melena or hematochezia.  GENITOURINARY: No dysuria, frequency or hematuria  NEUROLOGICAL: No numbness or weakness  SKIN: No itching, burning, rashes, or lesions   All other review of systems is negative unless indicated above.    MEDICATIONS:  MEDICATIONS  (STANDING):  apixaban 5 milliGRAM(s) Oral two times a day  aspirin enteric coated 81 milliGRAM(s) Oral daily  bisacodyl Suppository 10 milliGRAM(s) Rectal daily  chlorhexidine 2% Cloths 1 Application(s) Topical daily  dextrose 50% Injectable 25 Gram(s) IV Push once  dextrose 50% Injectable 12.5 Gram(s) IV Push once  dextrose 50% Injectable 25 Gram(s) IV Push once  dextrose Oral Gel 15 Gram(s) Oral once  diphtheria/tetanus/pertussis (acellular) Vaccine (Adacel) 0.5 milliLiter(s) IntraMuscular once  glucagon  Injectable 1 milliGRAM(s) IntraMuscular once  lacosamide IVPB 100 milliGRAM(s) IV Intermittent every 12 hours  levETIRAcetam  IVPB 1500 milliGRAM(s) IV Intermittent every 12 hours  lidocaine   4% Patch 1 Patch Transdermal every 24 hours  multivitamin 1 Tablet(s) Oral daily      PHYSICAL EXAM:  T(C): 36.8 (22 @ 18:24), Max: 36.8 (22 @ 18:24)  HR: 83 (22 @ 18:24) (74 - 83)  BP: 113/80 (22 @ 18:24) (113/80 - 121/84)  RR: 18 (22 @ 18:24) (17 - 18)  SpO2: 98% (22 @ 18:24) (98% - 100%)  Wt(kg): --  I&O's Summary        Appearance: Normal	  HEENT:  PERRLA   Lymphatic: No lymphadenopathy   Cardiovascular: Normal S1 S2, no JVD  Respiratory: normal effort , clear  Gastrointestinal:  Soft, Non-tender  Skin: No rashes,  warm to touch  Psychiatry:  Mood & affect appropriate  Musculuskeletal: L knee and ankle pain      All labs, Imaging and EKGs personally reviewed                           11.2   6.71  )-----------( 423      ( 26 Dec 2022 05:58 )             34.4                   138  |  103  |  14  ----------------------------<  107<H>  4.3   |  27  |  0.74    Ca    8.4      26 Dec 2022 05:58  Phos  3.5       Mg     2.00         TPro  7.0  /  Alb  3.0<L>  /  TBili  0.5  /  DBili  x   /  AST  20  /  ALT  32  /  AlkPhos  132<H>                         Urinalysis Basic - ( 26 Dec 2022 16:48 )    Color: Light Yellow / Appearance: Clear / S.014 / pH: x  Gluc: x / Ketone: Negative  / Bili: Negative / Urobili: <2 mg/dL   Blood: x / Protein: Negative / Nitrite: Negative   Leuk Esterase: Negative / RBC: x / WBC x   Sq Epi: x / Non Sq Epi: x / Bacteria: x

## 2022-12-26 NOTE — PROGRESS NOTE ADULT - SUBJECTIVE AND OBJECTIVE BOX
Subjective: Patient seen and examined. No new events except as noted.     REVIEW OF SYSTEMS:    CONSTITUTIONAL: No weakness, fevers or chills  EYES/ENT: No visual changes;  No vertigo or throat pain   NECK: No pain or stiffness  RESPIRATORY: No cough, wheezing, hemoptysis; No shortness of breath  CARDIOVASCULAR: No chest pain or palpitations  GASTROINTESTINAL: No abdominal or epigastric pain. No nausea, vomiting, or hematemesis; No diarrhea or constipation. No melena or hematochezia.  GENITOURINARY: No dysuria, frequency or hematuria  NEUROLOGICAL: No numbness or weakness  SKIN: No itching, burning, rashes, or lesions   All other review of systems is negative unless indicated above.    MEDICATIONS:  MEDICATIONS  (STANDING):  apixaban 5 milliGRAM(s) Oral two times a day  aspirin enteric coated 81 milliGRAM(s) Oral daily  bisacodyl Suppository 10 milliGRAM(s) Rectal daily  cefTRIAXone   IVPB 1000 milliGRAM(s) IV Intermittent every 24 hours  chlorhexidine 2% Cloths 1 Application(s) Topical daily  dextrose 50% Injectable 25 Gram(s) IV Push once  dextrose 50% Injectable 12.5 Gram(s) IV Push once  dextrose 50% Injectable 25 Gram(s) IV Push once  dextrose Oral Gel 15 Gram(s) Oral once  diphtheria/tetanus/pertussis (acellular) Vaccine (Adacel) 0.5 milliLiter(s) IntraMuscular once  glucagon  Injectable 1 milliGRAM(s) IntraMuscular once  lacosamide IVPB 100 milliGRAM(s) IV Intermittent every 12 hours  levETIRAcetam  IVPB 1500 milliGRAM(s) IV Intermittent every 12 hours  lidocaine   4% Patch 1 Patch Transdermal every 24 hours  multivitamin 1 Tablet(s) Oral daily      PHYSICAL EXAM:  T(C): 36.4 (12-26-22 @ 06:13), Max: 36.6 (12-25-22 @ 22:13)  HR: 81 (12-26-22 @ 06:13) (74 - 86)  BP: 121/84 (12-26-22 @ 06:13) (118/79 - 121/84)  RR: 17 (12-26-22 @ 06:13) (17 - 18)  SpO2: 100% (12-26-22 @ 06:13) (98% - 100%)  Wt(kg): --  I&O's Summary        Appearance: Normal	  HEENT:   Normal oral mucosa, PERRL, EOMI	  Lymphatic: No lymphadenopathy , no edema  Cardiovascular: Normal S1 S2, No JVD, No murmurs , Peripheral pulses palpable 2+ bilaterally  Respiratory: Lungs clear to auscultation, normal effort 	  Gastrointestinal:  Soft, Non-tender, + BS	  Skin: No rashes, No ecchymoses, No cyanosis, warm to touch  Musculoskeletal: Normal range of motion, normal strength  Psychiatry:  Mood & affect appropriate  Ext: No edema      LABS:    CARDIAC MARKERS:                                11.2   6.71  )-----------( 423      ( 26 Dec 2022 05:58 )             34.4     12-26    138  |  103  |  14  ----------------------------<  107<H>  4.3   |  27  |  0.74    Ca    8.4      26 Dec 2022 05:58  Phos  3.5     12-26  Mg     2.00     12-26    TPro  7.0  /  Alb  3.0<L>  /  TBili  0.5  /  DBili  x   /  AST  20  /  ALT  32  /  AlkPhos  132<H>  12-26    proBNP:   Lipid Profile:   HgA1c:   TSH:     2          TELEMETRY: 	    ECG:  	  RADIOLOGY:   DIAGNOSTIC TESTING:  [ ] Echocardiogram:  [ ]  Catheterization:  [ ] Stress Test:    OTHER:

## 2022-12-26 NOTE — PROGRESS NOTE ADULT - SUBJECTIVE AND OBJECTIVE BOX
[facility] [facility]   MD SIVA CLAUDIO MD RUORU WONG, PA    TEL:  FROM 9 AM to 5 PM ---OFFICE: [phone]    FROM 5 PM - 9 AM PLEASE CALL ANSWERING SERVICE: [phone]    RENAL FOLLOW UP NOTE--Date of Service 12-26-22 @ 10:16  --------------------------------------------------------------------------------  HPI:  Pt seen and examined at bedside.       PAST HISTORY  --------------------------------------------------------------------------------  No significant changes to PMH, PSH, FHx, SHx, unless otherwise noted    ALLERGIES & MEDICATIONS  --------------------------------------------------------------------------------  Allergies    No Known Allergies    Intolerances      Standing Inpatient Medications  apixaban 5 milliGRAM(s) Oral two times a day  aspirin enteric coated 81 milliGRAM(s) Oral daily  bisacodyl Suppository 10 milliGRAM(s) Rectal daily  cefTRIAXone   IVPB 1000 milliGRAM(s) IV Intermittent every 24 hours  chlorhexidine 2% Cloths 1 Application(s) Topical daily  dextrose 50% Injectable 25 Gram(s) IV Push once  dextrose 50% Injectable 12.5 Gram(s) IV Push once  dextrose 50% Injectable 25 Gram(s) IV Push once  dextrose Oral Gel 15 Gram(s) Oral once  diphtheria/tetanus/pertussis (acellular) Vaccine (Adacel) 0.5 milliLiter(s) IntraMuscular once  glucagon  Injectable 1 milliGRAM(s) IntraMuscular once  lacosamide IVPB 100 milliGRAM(s) IV Intermittent every 12 hours  levETIRAcetam  IVPB 1500 milliGRAM(s) IV Intermittent every 12 hours  lidocaine   4% Patch 1 Patch Transdermal every 24 hours  multivitamin 1 Tablet(s) Oral daily    PRN Inpatient Medications  ibuprofen  Tablet. 400 milliGRAM(s) Oral every 6 hours PRN  oxyCODONE    IR 5 milliGRAM(s) Oral every 6 hours PRN      REVIEW OF SYSTEMS  --------------------------------------------------------------------------------  General: no fever  MSK: no edema     VITALS/PHYSICAL EXAM  --------------------------------------------------------------------------------  T(C): 36.4 (12-26-22 @ 06:13), Max: 36.6 (12-25-22 @ 22:13)  HR: 81 (12-26-22 @ 06:13) (74 - 86)  BP: 121/84 (12-26-22 @ 06:13) (118/79 - 121/84)  RR: 17 (12-26-22 @ 06:13) (17 - 18)  SpO2: 100% (12-26-22 @ 06:13) (98% - 100%)  Wt(kg): --        Physical Exam:  	Gen: NAD  	HEENT: MMM  	Pulm: CTA B/L  	CV: S1S2  	Abd: Soft, +BS  	Ext: No LE edema B/L                      Neuro: Awake   	Skin: Warm and Dry   	Vascular access: NO HD catheter             no fish  LABS/STUDIES  --------------------------------------------------------------------------------              11.2   6.71  >-----------<  423      [12-26-22 @ 05:58]              34.4     138  |  103  |  14  ----------------------------<  107      [12-26-22 @ 05:58]  4.3   |  27  |  0.74        Ca     8.4     [12-26-22 @ 05:58]      Mg     2.00     [12-26-22 @ 05:58]      Phos  3.5     [12-26-22 @ 05:58]    TPro  7.0  /  Alb  3.0  /  TBili  0.5  /  DBili  x   /  AST  20  /  ALT  32  /  AlkPhos  132  [12-26-22 @ 05:58]          Creatinine Trend:  SCr 0.74 [12-26 @ 05:58]  SCr 0.77 [12-25 @ 06:12]  SCr 0.84 [12-24 @ 06:00]  SCr 0.81 [12-23 @ 05:04]  SCr 0.83 [12-22 @ 06:40]    Urinalysis - [12-24-22 @ 15:30]      Color Light Yellow / Appearance Clear / SG 1.007 / pH 6.5      Gluc Negative / Ketone Negative  / Bili Negative / Urobili <2 mg/dL       Blood Negative / Protein Trace / Leuk Est Large / Nitrite Negative      RBC 2 / WBC 38 / Hyaline 2 / Gran  / Sq Epi  / Non Sq Epi 7 / Bacteria Negative    Urine Sodium 60      [12-19-22 @ 15:00]  Urine Osmolality 389      [12-19-22 @ 15:00]    Iron 49, TIBC 142, %sat 35      [12-14-22 @ 04:30]  Ferritin 692      [12-14-22 @ 04:30]  PTH -- (Ca --)      [12-18-22 @ 06:46]   27  Vitamin D (25OH) 44.1      [12-18-22 @ 06:46]  TSH 1.30      [12-05-22 @ 02:30]  Lipid: chol 131, , HDL 34, LDL --      [11-05-22 @ 06:57]

## 2022-12-27 LAB
ALBUMIN SERPL ELPH-MCNC: 3.1 G/DL — LOW (ref 3.3–5)
ALP SERPL-CCNC: 128 U/L — HIGH (ref 40–120)
ALT FLD-CCNC: 31 U/L — SIGNIFICANT CHANGE UP (ref 4–41)
ANION GAP SERPL CALC-SCNC: 10 MMOL/L — SIGNIFICANT CHANGE UP (ref 7–14)
AST SERPL-CCNC: 20 U/L — SIGNIFICANT CHANGE UP (ref 4–40)
BASOPHILS # BLD AUTO: 0.05 K/UL — SIGNIFICANT CHANGE UP (ref 0–0.2)
BASOPHILS NFR BLD AUTO: 0.8 % — SIGNIFICANT CHANGE UP (ref 0–2)
BILIRUB SERPL-MCNC: 0.6 MG/DL — SIGNIFICANT CHANGE UP (ref 0.2–1.2)
BUN SERPL-MCNC: 11 MG/DL — SIGNIFICANT CHANGE UP (ref 7–23)
CALCIUM SERPL-MCNC: 8.6 MG/DL — SIGNIFICANT CHANGE UP (ref 8.4–10.5)
CHLORIDE SERPL-SCNC: 103 MMOL/L — SIGNIFICANT CHANGE UP (ref 98–107)
CO2 SERPL-SCNC: 25 MMOL/L — SIGNIFICANT CHANGE UP (ref 22–31)
CREAT SERPL-MCNC: 0.7 MG/DL — SIGNIFICANT CHANGE UP (ref 0.5–1.3)
EGFR: 111 ML/MIN/1.73M2 — SIGNIFICANT CHANGE UP
EOSINOPHIL # BLD AUTO: 0.21 K/UL — SIGNIFICANT CHANGE UP (ref 0–0.5)
EOSINOPHIL NFR BLD AUTO: 3.2 % — SIGNIFICANT CHANGE UP (ref 0–6)
FLUAV AG NPH QL: SIGNIFICANT CHANGE UP
FLUBV AG NPH QL: SIGNIFICANT CHANGE UP
GLUCOSE SERPL-MCNC: 94 MG/DL — SIGNIFICANT CHANGE UP (ref 70–99)
HCT VFR BLD CALC: 35.9 % — LOW (ref 39–50)
HGB BLD-MCNC: 11.7 G/DL — LOW (ref 13–17)
IANC: 3.99 K/UL — SIGNIFICANT CHANGE UP (ref 1.8–7.4)
IMM GRANULOCYTES NFR BLD AUTO: 2.3 % — HIGH (ref 0–0.9)
LYMPHOCYTES # BLD AUTO: 1.48 K/UL — SIGNIFICANT CHANGE UP (ref 1–3.3)
LYMPHOCYTES # BLD AUTO: 22.7 % — SIGNIFICANT CHANGE UP (ref 13–44)
MAGNESIUM SERPL-MCNC: 1.9 MG/DL — SIGNIFICANT CHANGE UP (ref 1.6–2.6)
MCHC RBC-ENTMCNC: 29.8 PG — SIGNIFICANT CHANGE UP (ref 27–34)
MCHC RBC-ENTMCNC: 32.6 GM/DL — SIGNIFICANT CHANGE UP (ref 32–36)
MCV RBC AUTO: 91.3 FL — SIGNIFICANT CHANGE UP (ref 80–100)
MONOCYTES # BLD AUTO: 0.65 K/UL — SIGNIFICANT CHANGE UP (ref 0–0.9)
MONOCYTES NFR BLD AUTO: 10 % — SIGNIFICANT CHANGE UP (ref 2–14)
NEUTROPHILS # BLD AUTO: 3.99 K/UL — SIGNIFICANT CHANGE UP (ref 1.8–7.4)
NEUTROPHILS NFR BLD AUTO: 61 % — SIGNIFICANT CHANGE UP (ref 43–77)
NRBC # BLD: 0 /100 WBCS — SIGNIFICANT CHANGE UP (ref 0–0)
NRBC # FLD: 0 K/UL — SIGNIFICANT CHANGE UP (ref 0–0)
PHOSPHATE SERPL-MCNC: 3.4 MG/DL — SIGNIFICANT CHANGE UP (ref 2.5–4.5)
PLATELET # BLD AUTO: 407 K/UL — HIGH (ref 150–400)
POTASSIUM SERPL-MCNC: 3.8 MMOL/L — SIGNIFICANT CHANGE UP (ref 3.5–5.3)
POTASSIUM SERPL-SCNC: 3.8 MMOL/L — SIGNIFICANT CHANGE UP (ref 3.5–5.3)
PROT SERPL-MCNC: 7 G/DL — SIGNIFICANT CHANGE UP (ref 6–8.3)
RBC # BLD: 3.93 M/UL — LOW (ref 4.2–5.8)
RBC # FLD: 14.4 % — SIGNIFICANT CHANGE UP (ref 10.3–14.5)
RSV RNA NPH QL NAA+NON-PROBE: SIGNIFICANT CHANGE UP
SARS-COV-2 RNA SPEC QL NAA+PROBE: DETECTED
SODIUM SERPL-SCNC: 138 MMOL/L — SIGNIFICANT CHANGE UP (ref 135–145)
WBC # BLD: 6.53 K/UL — SIGNIFICANT CHANGE UP (ref 3.8–10.5)
WBC # FLD AUTO: 6.53 K/UL — SIGNIFICANT CHANGE UP (ref 3.8–10.5)

## 2022-12-27 RX ORDER — OXYCODONE HYDROCHLORIDE 5 MG/1
5 TABLET ORAL EVERY 6 HOURS
Refills: 0 | Status: DISCONTINUED | OUTPATIENT
Start: 2022-12-27 | End: 2023-01-03

## 2022-12-27 RX ADMIN — LIDOCAINE 1 PATCH: 4 CREAM TOPICAL at 17:56

## 2022-12-27 RX ADMIN — LEVETIRACETAM 400 MILLIGRAM(S): 250 TABLET, FILM COATED ORAL at 17:56

## 2022-12-27 RX ADMIN — LIDOCAINE 1 PATCH: 4 CREAM TOPICAL at 19:57

## 2022-12-27 RX ADMIN — LACOSAMIDE 120 MILLIGRAM(S): 50 TABLET ORAL at 18:20

## 2022-12-27 RX ADMIN — OXYCODONE HYDROCHLORIDE 5 MILLIGRAM(S): 5 TABLET ORAL at 16:37

## 2022-12-27 RX ADMIN — Medication 1 TABLET(S): at 11:55

## 2022-12-27 RX ADMIN — Medication 81 MILLIGRAM(S): at 11:55

## 2022-12-27 RX ADMIN — LACOSAMIDE 120 MILLIGRAM(S): 50 TABLET ORAL at 05:16

## 2022-12-27 RX ADMIN — LIDOCAINE 1 PATCH: 4 CREAM TOPICAL at 05:15

## 2022-12-27 RX ADMIN — APIXABAN 5 MILLIGRAM(S): 2.5 TABLET, FILM COATED ORAL at 05:16

## 2022-12-27 RX ADMIN — LEVETIRACETAM 400 MILLIGRAM(S): 250 TABLET, FILM COATED ORAL at 05:16

## 2022-12-27 RX ADMIN — APIXABAN 5 MILLIGRAM(S): 2.5 TABLET, FILM COATED ORAL at 17:57

## 2022-12-27 RX ADMIN — CHLORHEXIDINE GLUCONATE 1 APPLICATION(S): 213 SOLUTION TOPICAL at 11:55

## 2022-12-27 NOTE — PROGRESS NOTE ADULT - ASSESSMENT
53yo male with left foot/ankle pain, weakness  ·	Patient was seen and evaluated  ·	Chart reviewed, patient likely experiencing proximally derived weakness manifesting at the left ankle/foot appreciate neuro recs, no sign that there is anything peripherally abnormal with respect to neurologic etiology based on clinical exam at the foot and ankle level and negative radiographs  ·	No signs of infection at the foot and ankle level currently, eschar incidental and stable to left foot  ·	Continue Z flows, consider night splint avoid contracture if no signs of improvement are noted  ·	Will follow

## 2022-12-27 NOTE — PROGRESS NOTE ADULT - SUBJECTIVE AND OBJECTIVE BOX
Subjective: Patient seen and examined. No new events except as noted.   seen this am  resting comforatbly    REVIEW OF SYSTEMS:    CONSTITUTIONAL:+ weakness, fevers or chills  EYES/ENT: No visual changes;  No vertigo or throat pain   NECK: No pain or stiffness  RESPIRATORY: No cough, wheezing, hemoptysis; No shortness of breath  CARDIOVASCULAR: No chest pain or palpitations  GASTROINTESTINAL: No abdominal or epigastric pain. No nausea, vomiting, or hematemesis; No diarrhea or constipation. No melena or hematochezia.  GENITOURINARY: No dysuria, frequency or hematuria  NEUROLOGICAL: No numbness or weakness  SKIN: No itching, burning, rashes, or lesions   All other review of systems is negative unless indicated above.    MEDICATIONS:  MEDICATIONS  (STANDING):  apixaban 5 milliGRAM(s) Oral two times a day  aspirin enteric coated 81 milliGRAM(s) Oral daily  bisacodyl Suppository 10 milliGRAM(s) Rectal daily  chlorhexidine 2% Cloths 1 Application(s) Topical daily  dextrose 50% Injectable 25 Gram(s) IV Push once  dextrose 50% Injectable 12.5 Gram(s) IV Push once  dextrose 50% Injectable 25 Gram(s) IV Push once  dextrose Oral Gel 15 Gram(s) Oral once  diphtheria/tetanus/pertussis (acellular) Vaccine (Adacel) 0.5 milliLiter(s) IntraMuscular once  glucagon  Injectable 1 milliGRAM(s) IntraMuscular once  lacosamide IVPB 100 milliGRAM(s) IV Intermittent every 12 hours  levETIRAcetam  IVPB 1500 milliGRAM(s) IV Intermittent every 12 hours  lidocaine   4% Patch 1 Patch Transdermal every 24 hours  multivitamin 1 Tablet(s) Oral daily      PHYSICAL EXAM:  T(C): 36.9 (12-27-22 @ 20:37), Max: 36.9 (12-27-22 @ 20:37)  HR: 88 (12-27-22 @ 20:37) (81 - 88)  BP: 106/76 (12-27-22 @ 20:37) (106/76 - 131/89)  RR: 18 (12-27-22 @ 20:37) (17 - 18)  SpO2: 97% (12-27-22 @ 20:37) (97% - 100%)  Wt(kg): --  I&O's Summary        Appearance: Normal	  HEENT:   Normal oral mucosa, PERRL, EOMI	  Lymphatic: No lymphadenopathy , no edema  Cardiovascular: Normal S1 S2, No JVD, No murmurs , Peripheral pulses palpable 2+ bilaterally  Respiratory: Lungs clear to auscultation, normal effort 	  Gastrointestinal:  Soft, Non-tender, + BS	  Skin: No rashes, No ecchymoses, No cyanosis, warm to touch  Musculoskeletal: Normal range of motion, normal strength  Psychiatry:  Mood & affect appropriate  Ext: No edema      LABS:    CARDIAC MARKERS:                                11.7   6.53  )-----------( 407      ( 27 Dec 2022 05:04 )             35.9     12-27    138  |  103  |  11  ----------------------------<  94  3.8   |  25  |  0.70    Ca    8.6      27 Dec 2022 05:04  Phos  3.4     12-27  Mg     1.90     12-27    TPro  7.0  /  Alb  3.1<L>  /  TBili  0.6  /  DBili  x   /  AST  20  /  ALT  31  /  AlkPhos  128<H>  12-27    proBNP:   Lipid Profile:   HgA1c:   TSH:     Negative          TELEMETRY: 	    ECG:  	  RADIOLOGY:   DIAGNOSTIC TESTING:  [ ] Echocardiogram:  [ ]  Catheterization:  [ ] Stress Test:    OTHER:

## 2022-12-27 NOTE — PROGRESS NOTE ADULT - SUBJECTIVE AND OBJECTIVE BOX
Neurology Progress Note    S: Patient seen and examined ,    +_ covid precuations. on enghanced     Medication:  MEDICATIONS  (STANDING):  apixaban 5 milliGRAM(s) Oral two times a day  aspirin enteric coated 81 milliGRAM(s) Oral daily  bisacodyl Suppository 10 milliGRAM(s) Rectal daily  chlorhexidine 2% Cloths 1 Application(s) Topical daily  dextrose 50% Injectable 25 Gram(s) IV Push once  dextrose 50% Injectable 12.5 Gram(s) IV Push once  dextrose 50% Injectable 25 Gram(s) IV Push once  dextrose Oral Gel 15 Gram(s) Oral once  diphtheria/tetanus/pertussis (acellular) Vaccine (Adacel) 0.5 milliLiter(s) IntraMuscular once  glucagon  Injectable 1 milliGRAM(s) IntraMuscular once  lacosamide IVPB 100 milliGRAM(s) IV Intermittent every 12 hours  levETIRAcetam  IVPB 1500 milliGRAM(s) IV Intermittent every 12 hours  lidocaine   4% Patch 1 Patch Transdermal every 24 hours  multivitamin 1 Tablet(s) Oral daily    MEDICATIONS  (PRN):  ibuprofen  Tablet. 400 milliGRAM(s) Oral every 6 hours PRN Mild Pain (1 - 3), Moderate Pain (4 - 6)  oxyCODONE    IR 5 milliGRAM(s) Oral every 6 hours PRN moderate and severe pain  phenazopyridine 100 milliGRAM(s) Oral every 8 hours PRN dysuria         Vitals:      Vital Signs Last 24 Hrs  T(C): 36.6 (27 Dec 2022 04:58), Max: 36.8 (26 Dec 2022 18:24)  T(F): 97.8 (27 Dec 2022 04:58), Max: 98.2 (26 Dec 2022 18:24)  HR: 84 (27 Dec 2022 04:58) (76 - 84)  BP: 114/81 (27 Dec 2022 04:58) (113/80 - 131/89)  BP(mean): --  RR: 18 (27 Dec 2022 04:58) (18 - 18)  SpO2: 100% (27 Dec 2022 04:58) (98% - 100%)    Parameters below as of 26 Dec 2022 22:05  Patient On (Oxygen Delivery Method): room air           General Exam:   General Appearance: Appropriately dressed and in no acute distress       Head: Normocephalic, atraumatic and no dysmorphic features  Ear, Nose, and Throat: Moist mucous membranes    CVS: S1S2+  Resp: No SOB, no wheeze or rhonchi  Abd: soft NTND  Extremities: No edema, no cyanosis  Skin: No bruises, no rashes     Neurological Exam:    Mental Status: Awake, Oriented x 2-3, able to follow simple and complex verbal commands. answers questions appropriately, able to name, repeat and recall. no aphasia, mild dysarthria (tongue bite)   Cranial Nerves: EOMI, PERRLA, VFF, V1-V3 sensation intact, no facial asymmetry, tongue midline, hearing intact B/L, shoulder shrug appropriate, SCM/trap intact.   Motor: Moving uppers > lowers. uppers at least 4+-5/5 ; not moving LLE very well 2/2 knee pain otherwise intact    Sensation: intact to LT and PP   Reflexes: 1+ throughout at biceps, brachioradialis, triceps, patellars and ankles bilaterally and equal. No clonus. R toe and L toe were both downgoing.  Coordination: no dysmetria   Gait: unable     I personally reviewed the below data/images/labs:  CBC Full  -  ( 27 Dec 2022 05:04 )  WBC Count : 6.53 K/uL  RBC Count : 3.93 M/uL  Hemoglobin : 11.7 g/dL  Hematocrit : 35.9 %  Platelet Count - Automated : 407 K/uL  Mean Cell Volume : 91.3 fL  Mean Cell Hemoglobin : 29.8 pg  Mean Cell Hemoglobin Concentration : 32.6 gm/dL  Auto Neutrophil # : 3.99 K/uL  Auto Lymphocyte # : 1.48 K/uL  Auto Monocyte # : 0.65 K/uL  Auto Eosinophil # : 0.21 K/uL  Auto Basophil # : 0.05 K/uL  Auto Neutrophil % : 61.0 %  Auto Lymphocyte % : 22.7 %  Auto Monocyte % : 10.0 %  Auto Eosinophil % : 3.2 %  Auto Basophil % : 0.8 %    12-27    138  |  103  |  11  ----------------------------<  94  3.8   |  25  |  0.70    Ca    8.6      27 Dec 2022 05:04  Phos  3.4     12-27  Mg     1.90     12-27    TPro  7.0  /  Alb  3.1<L>  /  TBili  0.6  /  DBili  x   /  AST  20  /  ALT  31  /  AlkPhos  128<H>  12-27      < from: CT Head No Cont (12.02.22 @ 20:27) >    ACC: [id number] EXAM:  CT CERVICAL SPINE                        ACC: [id number] EXAM:  CT MAXILLOFACIAL                        ACC: [id number] EXAM:  CT BRAIN                          PROCEDURE DATE:  12/02/2022        INTERPRETATION:  CLINICAL INDICATION: Trauma.    Technique: Noncontrast axial CT of the head, facial bones, and cervical   spine was performed. 3-D reformats of the facial bones was obtained.   Coronal and sagittal reformats were obtained.      COMPARISON: None.    FINDINGS:  Head CT:  The ventricles and sulci are within normal limits. 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 There is no intraparenchymal hematoma, mass effect   or midline shift. No abnormal extra-axial fluid collections or   hemorrhages are present.    There is swelling of the left lateral scalp. The calvarium is intact.   There are scattered mucosal inflammatory changes in the paranasal sinuses.      Cervical spine CT:  Alignment ismaintained. Vertebral bodies are normal in height, without   evidence of fracture or dislocation. Prevertebral soft tissues are within   normal limits without soft tissue swelling or hematoma.    Intervertebral discs are intact. Neural foramina and spinal canal are   intact.    The visualized lung apices are within normal limits.      Facial bone CT:    No acute facial fracture.    There are scattered mucosal inflammatory changes in the paranasal   sinuses. Mastoid air cells are clear.    Soft tissues appear unremarkable.        IMPRESSION:  CT HEAD: No acute abnormality.  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  CT CERVICAL SPINE: No acute abnormality  CT FACIAL BONE: No acute abnormality    --- End of Report ---       DELGADO IVAN MD; Attending Radiologist  This document has been electronically signed. Dec  2 2022  9:02PM    < end of copied text >  < from: CT Lumbar Spine No Cont (12.02.22 @ 20:27) >    ACC: [id number] EXAM:  CT LUMBAR SPINE                        ACC: [id number] EXAM:  CT THORACIC SPINE                          PROCEDURE DATE:  12/02/2022          INTERPRETATION:  CT thoracic and lumbar spine without IV contrast    CLINICAL INFORMATION:  Trauma  Back pain, fracture.    TECHNIQUE:  Contiguous axial 2 mm sections were obtained through the   thoracic and lumbar spine using a single helical acquisition.     Additional 2 mm sagittal and coronal reconstructions of the spine were   obtained. These additional reformatted images were used to evaluate the   spine for alignment, vertebral fractures and the integrity of the the   posterior elements.   This scan was performed using automatic exposure   control (radiation dose reduction software) to obtain a diagnostic image   quality scan with patient dose as low as reasonably achievable.    FINDINGS:   No prior similar studies are available for review    Thoracic and lumbar vertebral body heights are maintained. No vertebral   fracture is seen. No destructive bone lesion is found.  Alignment is   preserved.  Facet joints appear intact and aligned.    Thoracic and lumbar intervertebral disc spaces appear intact.   Degenerative disc disease and spondylosis is noted at T6-T7 throughL4-5   with loss of disc height and associated degenerative endplate changes.   Mild disc bulges at L2-3 through L4-5 flatten the ventral thecal sac and   narrow the BILATERAL neural foramina.    No paraspinal mass is recognized.  Paraspinal soft tissues appear intact.      IMPRESSION:  Degenerative disc disease and spondylosis is noted at T6-T7   through L4-5 with loss of disc height and associated degenerative   endplate changes. Mild disc bulges at L2-3 through L4-5 flatten the   ventral thecal sac and narrow the BILATERAL neural foramina   No   vertebral fracture is recognized.    --- End of Report ---       ANGIE ESCOBAR MD; Attending Radiologist  This document has been electronically signed. Dec  2 2022  8:55PM    < end of copied text >    EEG Classification / Summary:  Abnormal EEG in a comatose patient due to diffuse suppression gradually improving to discontinuous background, with right hemispheric relative attenuation/suppression. No epileptiform abnormalities or seizures are captured.    Clinical Impression:  Severe diffuse cerebral dysfunction that gradually improves is nonspecific in etiology. In this case, it may be related to sedating medication (propofol) with improvement after decreasing and stopping the medication.  Right hemispheric focal cerebral dysfunction can be structural or functional in etiology.      12/7  Clinical Impression:  -Occasional runs of right frontal lateralized periodic discharges (LPDs) at up to 1.3 Hz indicate a potentially epileptogenic focus in the right frontal region and are on the ictal-interictal continuum.  -A pattern on the ictal-interictal continuum is a pattern that does not qualify as an electrographic seizure or electrographic status epilepticus, but there is a reasonable chance that it may be contributing to impaired alertness, causing other clinical symptoms, and/or contributing to neuronal injury.  -Right hemispheric relative attenuation indicates right hemispheric focal cerebral dysfunction, which can be structural or functional in etiology.  -Rare left frontal epileptiform discharges indicate a potentially epileptogenic focus in this reigon  -Severe diffuse slowing and GRDA indicate severe diffuse cerebral dysfunction of nonspecific etiology.  -No electrographic seizures are captured.     < from: MR Head w/wo IV Cont (12.09.22 @ 19:54) >    ACC: [id number] EXAM:  MR BRAIN WAW IC                          PROCEDURE DATE:  12/09/2022          INTERPRETATION:  CLINICAL INDICATION: CVA, found unresponsive at home      Magnetic resonance imaging of the brain was carried out with transaxial   SPGR, FLAIR, fast spin echo T2 weighted images, axial susceptibility   weighted series, diffusion weighted series and sagittal T1 weighted   series on a 1.5 Maritza magnet. Post contrast axial, coronal and sagittal   T1 weighted images were obtained. 10cc of Gadavist were intravenously   injected, 0 cc were discarded.      Comparison is made with the prior brain CT of 12/5/2022 and MRI 11/5/2022.    Mild ventricular and sulcal prominence is consistent with moderate   atrophy for the patient's age. No acute hemorrhage is identified. There   is a focus of hemosiderin within the alexis which is calcified on CT.   Scattered additional foci of hemosiderin deposition are identified in the   left frontal subcortical white matter and right occipital cortex is   nonspecific but may be related to multiple cavernoma is or hypertension.    There is a tiny focus of diffusion restriction in the right frontal   subcortical white matter and right centrum semiovale which are unchanged   since the prior exam and may represent subacute infarcts. Multiplicity   infarcts may be related to hypercoagulable state or cardioembolic events.    After contrast administration there is normal intracranial vascular   enhancement. No abnormal parenchymal or leptomeningeal enhancement.      IMPRESSION: Atrophy for the patient's age. Right frontal subcortical and   right centrum semiovale punctate infarcts are unchanged since 11/5/2022   and likely represent subacute infarcts. No abnormal enhancement. Focus of   calcification in the alexis with old hemosiderin. A few additional   scattered foci of hemosiderin deposition are unchanged.    --- End of Report ---            JUAN MANUEL CASTILLO MD; Attending Radiologist  This document has been electronically signed. Dec  9 2022  8:05PM    < end of copied text >

## 2022-12-27 NOTE — PROGRESS NOTE ADULT - SUBJECTIVE AND OBJECTIVE BOX
Name of Patient : TAMI DUNHAM  MRN: [id number]  Date of visit: 22       Subjective: Patient seen and examined. No new events except as noted.   Doing okay     REVIEW OF SYSTEMS:    CONSTITUTIONAL: No weakness, fevers or chills  EYES/ENT: No visual changes;  No vertigo or throat pain   NECK: No pain or stiffness  RESPIRATORY: No cough, wheezing, hemoptysis; No shortness of breath  CARDIOVASCULAR: No chest pain or palpitations  GASTROINTESTINAL: No abdominal or epigastric pain.   GENITOURINARY: No dysuria, frequency or hematuria  NEUROLOGICAL: LE weakness   SKIN: No itching, burning, rashes, or lesions   All other review of systems is negative unless indicated above.    MEDICATIONS:  MEDICATIONS  (STANDING):  apixaban 5 milliGRAM(s) Oral two times a day  aspirin enteric coated 81 milliGRAM(s) Oral daily  bisacodyl Suppository 10 milliGRAM(s) Rectal daily  chlorhexidine 2% Cloths 1 Application(s) Topical daily  dextrose 50% Injectable 25 Gram(s) IV Push once  dextrose 50% Injectable 12.5 Gram(s) IV Push once  dextrose 50% Injectable 25 Gram(s) IV Push once  dextrose Oral Gel 15 Gram(s) Oral once  diphtheria/tetanus/pertussis (acellular) Vaccine (Adacel) 0.5 milliLiter(s) IntraMuscular once  glucagon  Injectable 1 milliGRAM(s) IntraMuscular once  lacosamide IVPB 100 milliGRAM(s) IV Intermittent every 12 hours  levETIRAcetam  IVPB 1500 milliGRAM(s) IV Intermittent every 12 hours  lidocaine   4% Patch 1 Patch Transdermal every 24 hours  multivitamin 1 Tablet(s) Oral daily      PHYSICAL EXAM:  T(C): 36.9 (22 @ 20:37), Max: 36.9 (22 @ 20:37)  HR: 88 (22 @ 20:37) (81 - 88)  BP: 106/76 (22 @ 20:37) (106/76 - 131/89)  RR: 18 (22 @ 20:37) (17 - 18)  SpO2: 97% (22 @ 20:37) (97% - 100%)  Wt(kg): --  I&O's Summary        Appearance: Normal	  HEENT:  PERRLA   Lymphatic: No lymphadenopathy   Cardiovascular: Normal S1 S2, no JVD  Respiratory: normal effort , clear  Gastrointestinal:  Soft, Non-tender  Skin: No rashes,  warm to touch  Psychiatry:  Mood & affect appropriate  Musculuskeletal: + weakness       All labs, Imaging and EKGs personally reviewed                           11.7   6.53  )-----------( 407      ( 27 Dec 2022 05:04 )             35.9                   138  |  103  |  11  ----------------------------<  94  3.8   |  25  |  0.70    Ca    8.6      27 Dec 2022 05:04  Phos  3.4       Mg     1.90         TPro  7.0  /  Alb  3.1<L>  /  TBili  0.6  /  DBili  x   /  AST  20  /  ALT  31  /  AlkPhos  128<H>                         Urinalysis Basic - ( 26 Dec 2022 16:48 )    Color: Light Yellow / Appearance: Clear / S.014 / pH: x  Gluc: x / Ketone: Negative  / Bili: Negative / Urobili: <2 mg/dL   Blood: x / Protein: Negative / Nitrite: Negative   Leuk Esterase: Negative / RBC: x / WBC x   Sq Epi: x / Non Sq Epi: x / Bacteria: x

## 2022-12-27 NOTE — PROGRESS NOTE ADULT - SUBJECTIVE AND OBJECTIVE BOX
[facility]   MD SIVA CLAUDIO MD KRISTINE SOLTANPOUR, DO ANGELA WONG, PA    TEL:  OFFICE: [phone]  From 5pm-7am Answering Service [phone]    -- RENAL FOLLOW UP NOTE ---Date of Service 12-27-22 @ 12:11    Patient is a 52y old  Male who presents with a chief complaint of Unresponsive (27 Dec 2022 11:45)      Patient seen and examined at bedside. No chest pain/sob. c/o persistent severe left knee and ankle pain    VITALS:  T(F): 97.8 (12-27-22 @ 12:00), Max: 98.2 (12-26-22 @ 18:24)  HR: 86 (12-27-22 @ 12:00)  BP: 118/76 (12-27-22 @ 12:00)  RR: 18 (12-27-22 @ 12:00)  SpO2: 100% (12-27-22 @ 12:00)  Wt(kg): --        PHYSICAL EXAM:  General: NAD  Neck: No JVD  Respiratory: CTAB, no wheezes, rales or rhonchi  Cardiovascular: S1, S2, RRR  Gastrointestinal: BS+, soft, NT/ND  Extremities: No peripheral edema    Hospital Medications:   MEDICATIONS  (STANDING):  apixaban 5 milliGRAM(s) Oral two times a day  aspirin enteric coated 81 milliGRAM(s) Oral daily  bisacodyl Suppository 10 milliGRAM(s) Rectal daily  chlorhexidine 2% Cloths 1 Application(s) Topical daily  dextrose 50% Injectable 25 Gram(s) IV Push once  dextrose 50% Injectable 12.5 Gram(s) IV Push once  dextrose 50% Injectable 25 Gram(s) IV Push once  dextrose Oral Gel 15 Gram(s) Oral once  diphtheria/tetanus/pertussis (acellular) Vaccine (Adacel) 0.5 milliLiter(s) IntraMuscular once  glucagon  Injectable 1 milliGRAM(s) IntraMuscular once  lacosamide IVPB 100 milliGRAM(s) IV Intermittent every 12 hours  levETIRAcetam  IVPB 1500 milliGRAM(s) IV Intermittent every 12 hours  lidocaine   4% Patch 1 Patch Transdermal every 24 hours  multivitamin 1 Tablet(s) Oral daily      LABS:  12-27    138  |  103  |  11  ----------------------------<  94  3.8   |  25  |  0.70    Ca    8.6      27 Dec 2022 05:04  Phos  3.4     12-27  Mg     1.90     12-27    TPro  7.0  /  Alb  3.1<L>  /  TBili  0.6  /  DBili      /  AST  20  /  ALT  31  /  AlkPhos  128<H>  12-27    Creatinine Trend: 0.70 <--, 0.74 <--, 0.77 <--, 0.84 <--, 0.81 <--, 0.83 <--, 0.83 <--    Albumin, Serum: 3.1 g/dL (12-27 @ 05:04)  Phosphorus Level, Serum: 3.4 mg/dL (12-27 @ 05:04)                              11.7   6.53  )-----------( 407      ( 27 Dec 2022 05:04 )             35.9     Urine Studies:  Urinalysis - [12-26-22 @ 16:48]      Color Light Yellow / Appearance Clear / SG 1.014 / pH 7.0      Gluc Negative / Ketone Negative  / Bili Negative / Urobili <2 mg/dL       Blood Negative / Protein Negative / Leuk Est Negative / Nitrite Negative      RBC  / WBC  / Hyaline  / Gran  / Sq Epi  / Non Sq Epi  / Bacteria       Iron 49, TIBC 142, %sat 35      [12-14-22 @ 04:30]  Ferritin 692      [12-14-22 @ 04:30]  PTH -- (Ca --)      [12-18-22 @ 06:46]   27  Vitamin D (25OH) 44.1      [12-18-22 @ 06:46]  TSH 1.30      [12-05-22 @ 02:30]  Lipid: chol 131, , HDL 34, LDL --      [11-05-22 @ 06:57]        RADIOLOGY & ADDITIONAL STUDIES:

## 2022-12-27 NOTE — PROGRESS NOTE ADULT - ASSESSMENT
52-year-old male with a PMHx significant for TIAs (2011, 2013), CVAs x3 (02/2022 s/p tPA, 8/3/2022 s/p tPA, 11/5/2022 with residual expressive aphasia) on Eliquis, and HLD BIBEMS after being found unresponsive at home on the floor, last known well 12/1 at around noon. C-collar was placed. Patient was intubated in the ED for airway protection. CTH with no acute findings, vEEG with no identified seizures. Patient was weaned off pressors, extubated, and transitioned to floors 12/4/22. 12/5 am RRT called for seizure like activity with urinary incontinence, tongue biting, and R>L posturing, patient s/p ativan 2mg x3. Anesthesia called to RRT for intubation. MICU accepting patient for seizure like activity requiring intubation.      #AMS, Seizure like activity  EEG 12/7: concern for epileptiform activity; though EEG from 12/5 without such abnormalities.   - f/u neuro recs  - Patient found down and unresponsive at home on 12/2, last known well 12/1 at around noon. Intubated in ED 12/2, extubated in MICU 12/4  - RRT 12/5: seizure like acting with urinary incontinence, convulsions, and tongue biting; s/p ativan 2mg x3 with pauses in seizure activity following each; intubated for airway protection with rocuronium. Prolactin ~75   - CTH and CT cervical spine 12/2 negative for any acute pathologies. Tox screen on admission negative. 12/5: Stable exam from prior CT head, chronic microvascular ischemic changes, parenchymal loss, dystrophic calcification of central portion of alexis unchanged.  - Video EEG 12/3-12/4 without any seizure like activity, moderate to severe nonspecific diffuse or multifocal cerebral dysfunction  - c/w keppra 1500mg q12 maintenance, Keppra loaded 4.5g  - patient extubated again on 12/8th  -MRI noted, chronci CVA, no acute CVA noted           #CVAs/TIAs  - Hx of TIAs in 2011 and 2013, CVAs x3 in 02/22, 8/22, and 11/22 with residual expressive aphasia   - on Eliquis and Aspirin at home for hx of stroke  - PFO work up in past negative, no evidence of PFO on MIMI X2  - c/w ASA   - c/w heparin gtt, was on hold, resume eliquis   - Restart Statin when LFTs and CPK improve per neurology  - Being worked up for Mixed Connective Tissue Disease OP- f/u p-ANCA, c-ANCA/ Bfrj1sheuovqlejac negative  - Neuro recs appreciated  - repeat brain MRI   - LE pain, check MRI     # Hypernatremia  tredn Na level   renal eval appreciated  S/P hydraiton, trend Na level     #Thecal sac flattening   - CT thoracic and lumbar spine showing degenerative disc disease T6-T7 through L4-L5 and mild disc bulges at L2-L3 through L4-L5 that flatten the ventral thecal sac and narrowing of the bilateral neural foramina  - Will Monitor for now, will consider neuro/neurosurg evaluation in the future    #HLD  - atorvastatin on hold due to elevated CK and LFTs  - restart when possible for secondary stroke prevention    #?ASD/PFO  - Patient reportedly found to have a PFO in February 2022 during a stroke workup at [place]. Patient presented for a PFO closure in November 2022. Notably, no PFO was found at the time and no intervention was performed by Dr. Lynn.    - TTE 11/5/22 EF 57% and grossly normal LV function  - MIMI performed bedside 12/5 1 of 2 studies (+) on bubble study (uploaded to [facility])        #Rhabdomyolysis  - CK 8720 on admission, peaked at 15k, now downtrending  - DDx: prolonged downtime myositis v hyperthermia? (T 105.4 F) v sepsis v seizure induced    - L Ankle adn Knee pain, swelling  S/P Tap last week  cont to have pain and tenderness and swelling  Repeat imaging   Ortho follow up         # Elevated Winston level  CHeck Abd US noted    GI eval PRN   Trend LFTs , normalized, no need for MRCP       #Partial SBO - resolved  - CT chest, abdomen, and pelvis w/ contrast concerning for possible partial SBO without transition point.  - Surgery consulted, no acute intervention at this time  - 12/5 KUB - negative for ileus or SBO  - Hold TF for 12/7 for possible trial of extubation.  - otherwise diet per nutrition      #Leukocytosis  - worsening leukocytosis  - monitor for fever  - Ortho eval fro knee swelling   - Pan Cx  - LP by IR , follow up results         #Concern for sepsis  Unclear source, aspiration v less likely meningitis   BCx (12/2 NGTD repeat 12/5 NGTD) and UC 12/2 NGTD  - Patient initially presented with AMS, T 105.4, tachycardic, and tachypneic   - UA negative  - s/p vanco and zosyn x1 in ED 12/2, ceftriaxone 2g BID 12/3-12/4, vanco 12/3-12/4  - 12/7 DCed Vanc since BCx from 12/5 NGTD  - c/w zosyn for aspiration pna presumed. completed course, monitor   - ID eval appreciated  - febrile again, Ortho for knee asp      # ANemia  noted, no gross bleeding  type and screen  GI eval     Discussed with patient's family over the phone

## 2022-12-27 NOTE — PROGRESS NOTE ADULT - ASSESSMENT
52-year-old  M known to me from outpatient setting with  TIAs (2011, 2013), Strokes x3 (02/2022 s/p tPA, 8/3/2022 s/p tPA, 11/5/2022 with residual expressive aphasia) on Eliquis, was on testosterone outpatient stopped after last stroke, HLD BIBEMS after being found unresponsive  Temp 105.4 on arrival tachy and /110. 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   CT cervical spine and maxillofacial scans negative.  CT chest, abdomen, and pelvis w/ contrast concerning for possible partial SBO without transition point. Surgery consulted in ED, no acute surgical intervention at this time.   CT thoracic and lumbar spine showing degenerative disc disease T6-T7 through L4-L5 and mild disc bulges at L2-L3 through L4-L5 that flatten the ventral thecal sac and narrowing of the bilateral neural foramina.  MRI 11/2022: R centrum semiovale and R posterior frontal infarcts   takes adderall at home   + rhabdo  EEG no seizures   + transaminitis   CTH 12/5 stable   outpatient hypercoag workup neg   12/5 extubated then reintibuated for seizure activity and tx back to ICU   EEG this AM 12/5 with only slowing   EEG 12/6 slowing but no seizures   spoke with Pippa Conti (friend) who states after he was taken off the testosterone he ordered a mail order testosterone supplement, unclear if he started it yet, unclear what this was  12/7 EEG no electrogtraphic seizures captured but R LPDs, rare L frontal discharges, severe GRDA.   12/8 EEG improved.  extubated. following some commands   12/9 moving uppers> lowers   12/11 AAOx2 uppers 4-5/5; not moving LLE well   MRI brain neg for new stroke   s/p L knee tap arthrocentesis  by ortho on 12/12   spoke with friend pippa conti - she counted his adderrall and didn't take extra tables. did find ashwagandha and red wood (bottle was sealed) supplement in his apartment   s/p LP 12/5 --> CSF glucose and protein WNL.  will f/u remaining studies. non infectious appearing   o/e AAOx2, slurred but 2/2 tongue bite   + transaminitis   more alert  12/20 neuro exam improving AAOx2-3   c/o L ankle pain in addition to knee  + COVID     Impression:   1) AMS of unclear etiology, possible now seizure, related to adderral withdrawal? 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 had MIMI was question of PFO but not found. s/p ILR   - on enhanced supervision   - covid precuations   - at some poitn consider repeat MRI brain w/ adn w/o prior to discharge    -  L ankle imaging --. podiatry.   - resume AC s/p LP when able given L knee --> eliquis 5mg BID restarted ; will consider stopping this in future  - possible MRCP planning for Monday. GI f/u.  elevated LFTs --> LFTs improved--> downtrending . MRCP deferred   - ortho for L knee s/p tap / arthrocentesis   -  Vimpat 100mg BID    - keppra 1500mg BID.   - fever on arrival, treatred initially for meningitis.  was on abx for aspiration PNA.  >LP done--> non  infectious   - IVF  - CPK elevated. trend improving   -   endocrine workup/eval   - there was some concern outpatient he may have a mixed  connective tissue disorder   - statin therapy for secondary stroke prevention when LFTS and CPK improved   - telemetry  - PT/OT/SS/SLP, OOBC  - check FS, glucose control <180  - GI/DVT ppx  - Thank you for allowing me to participate in the care of this patient. Call with questions.   - spoke with friend at bedside, Galilea 12/11   - spoke with Pippa Conti at [phone] for more collateral info and to provide update. spoke again 12/12 over phone. spoke 12/19   spoke with parents at bedside 12/20   d/c planning M[facility] bender now eric Forde MD  Vascular Neurology  Office: [phone]

## 2022-12-28 LAB
ALBUMIN SERPL ELPH-MCNC: 2.9 G/DL — LOW (ref 3.3–5)
ALP SERPL-CCNC: 119 U/L — SIGNIFICANT CHANGE UP (ref 40–120)
ALT FLD-CCNC: 21 U/L — SIGNIFICANT CHANGE UP (ref 4–41)
ANION GAP SERPL CALC-SCNC: 9 MMOL/L — SIGNIFICANT CHANGE UP (ref 7–14)
AST SERPL-CCNC: 25 U/L — SIGNIFICANT CHANGE UP (ref 4–40)
BASOPHILS # BLD AUTO: 0.05 K/UL — SIGNIFICANT CHANGE UP (ref 0–0.2)
BASOPHILS NFR BLD AUTO: 0.7 % — SIGNIFICANT CHANGE UP (ref 0–2)
BILIRUB SERPL-MCNC: 0.6 MG/DL — SIGNIFICANT CHANGE UP (ref 0.2–1.2)
BUN SERPL-MCNC: 12 MG/DL — SIGNIFICANT CHANGE UP (ref 7–23)
CALCIUM SERPL-MCNC: 8.6 MG/DL — SIGNIFICANT CHANGE UP (ref 8.4–10.5)
CHLORIDE SERPL-SCNC: 104 MMOL/L — SIGNIFICANT CHANGE UP (ref 98–107)
CO2 SERPL-SCNC: 25 MMOL/L — SIGNIFICANT CHANGE UP (ref 22–31)
CREAT SERPL-MCNC: 0.77 MG/DL — SIGNIFICANT CHANGE UP (ref 0.5–1.3)
CRP SERPL-MCNC: <3 MG/L — SIGNIFICANT CHANGE UP
D DIMER BLD IA.RAPID-MCNC: 3722 NG/ML DDU — HIGH
EGFR: 108 ML/MIN/1.73M2 — SIGNIFICANT CHANGE UP
EOSINOPHIL # BLD AUTO: 0.28 K/UL — SIGNIFICANT CHANGE UP (ref 0–0.5)
EOSINOPHIL NFR BLD AUTO: 4.1 % — SIGNIFICANT CHANGE UP (ref 0–6)
FERRITIN SERPL-MCNC: 449 NG/ML — HIGH (ref 30–400)
GLUCOSE SERPL-MCNC: 86 MG/DL — SIGNIFICANT CHANGE UP (ref 70–99)
HCT VFR BLD CALC: 36.7 % — LOW (ref 39–50)
HGB BLD-MCNC: 11.7 G/DL — LOW (ref 13–17)
IANC: 3.82 K/UL — SIGNIFICANT CHANGE UP (ref 1.8–7.4)
IMM GRANULOCYTES NFR BLD AUTO: 2.5 % — HIGH (ref 0–0.9)
LDH SERPL L TO P-CCNC: 312 U/L — HIGH (ref 135–225)
LYMPHOCYTES # BLD AUTO: 1.8 K/UL — SIGNIFICANT CHANGE UP (ref 1–3.3)
LYMPHOCYTES # BLD AUTO: 26.7 % — SIGNIFICANT CHANGE UP (ref 13–44)
MAGNESIUM SERPL-MCNC: 2 MG/DL — SIGNIFICANT CHANGE UP (ref 1.6–2.6)
MCHC RBC-ENTMCNC: 29.4 PG — SIGNIFICANT CHANGE UP (ref 27–34)
MCHC RBC-ENTMCNC: 31.9 GM/DL — LOW (ref 32–36)
MCV RBC AUTO: 92.2 FL — SIGNIFICANT CHANGE UP (ref 80–100)
MONOCYTES # BLD AUTO: 0.63 K/UL — SIGNIFICANT CHANGE UP (ref 0–0.9)
MONOCYTES NFR BLD AUTO: 9.3 % — SIGNIFICANT CHANGE UP (ref 2–14)
NEUTROPHILS # BLD AUTO: 3.82 K/UL — SIGNIFICANT CHANGE UP (ref 1.8–7.4)
NEUTROPHILS NFR BLD AUTO: 56.7 % — SIGNIFICANT CHANGE UP (ref 43–77)
NRBC # BLD: 0 /100 WBCS — SIGNIFICANT CHANGE UP (ref 0–0)
NRBC # FLD: 0 K/UL — SIGNIFICANT CHANGE UP (ref 0–0)
PHOSPHATE SERPL-MCNC: 4.2 MG/DL — SIGNIFICANT CHANGE UP (ref 2.5–4.5)
PLATELET # BLD AUTO: 385 K/UL — SIGNIFICANT CHANGE UP (ref 150–400)
POTASSIUM SERPL-MCNC: 3.9 MMOL/L — SIGNIFICANT CHANGE UP (ref 3.5–5.3)
POTASSIUM SERPL-SCNC: 3.9 MMOL/L — SIGNIFICANT CHANGE UP (ref 3.5–5.3)
PROCALCITONIN SERPL-MCNC: 0.02 NG/ML — SIGNIFICANT CHANGE UP (ref 0.02–0.1)
PROT SERPL-MCNC: 6.7 G/DL — SIGNIFICANT CHANGE UP (ref 6–8.3)
RBC # BLD: 3.98 M/UL — LOW (ref 4.2–5.8)
RBC # FLD: 14.6 % — HIGH (ref 10.3–14.5)
SODIUM SERPL-SCNC: 138 MMOL/L — SIGNIFICANT CHANGE UP (ref 135–145)
WBC # BLD: 6.75 K/UL — SIGNIFICANT CHANGE UP (ref 3.8–10.5)
WBC # FLD AUTO: 6.75 K/UL — SIGNIFICANT CHANGE UP (ref 3.8–10.5)

## 2022-12-28 RX ORDER — ATORVASTATIN CALCIUM 80 MG/1
40 TABLET, FILM COATED ORAL AT BEDTIME
Refills: 0 | Status: DISCONTINUED | OUTPATIENT
Start: 2022-12-28 | End: 2023-01-11

## 2022-12-28 RX ORDER — TRAMADOL HYDROCHLORIDE 50 MG/1
25 TABLET ORAL EVERY 6 HOURS
Refills: 0 | Status: DISCONTINUED | OUTPATIENT
Start: 2022-12-28 | End: 2023-01-04

## 2022-12-28 RX ADMIN — TRAMADOL HYDROCHLORIDE 25 MILLIGRAM(S): 50 TABLET ORAL at 21:22

## 2022-12-28 RX ADMIN — TRAMADOL HYDROCHLORIDE 25 MILLIGRAM(S): 50 TABLET ORAL at 21:52

## 2022-12-28 RX ADMIN — ATORVASTATIN CALCIUM 40 MILLIGRAM(S): 80 TABLET, FILM COATED ORAL at 21:22

## 2022-12-28 RX ADMIN — OXYCODONE HYDROCHLORIDE 5 MILLIGRAM(S): 5 TABLET ORAL at 06:19

## 2022-12-28 RX ADMIN — TRAMADOL HYDROCHLORIDE 25 MILLIGRAM(S): 50 TABLET ORAL at 12:04

## 2022-12-28 RX ADMIN — LEVETIRACETAM 400 MILLIGRAM(S): 250 TABLET, FILM COATED ORAL at 18:40

## 2022-12-28 RX ADMIN — APIXABAN 5 MILLIGRAM(S): 2.5 TABLET, FILM COATED ORAL at 05:04

## 2022-12-28 RX ADMIN — LACOSAMIDE 120 MILLIGRAM(S): 50 TABLET ORAL at 17:04

## 2022-12-28 RX ADMIN — LEVETIRACETAM 400 MILLIGRAM(S): 250 TABLET, FILM COATED ORAL at 05:04

## 2022-12-28 RX ADMIN — TRAMADOL HYDROCHLORIDE 25 MILLIGRAM(S): 50 TABLET ORAL at 11:04

## 2022-12-28 RX ADMIN — Medication 10 MILLIGRAM(S): at 11:16

## 2022-12-28 RX ADMIN — APIXABAN 5 MILLIGRAM(S): 2.5 TABLET, FILM COATED ORAL at 17:05

## 2022-12-28 RX ADMIN — OXYCODONE HYDROCHLORIDE 5 MILLIGRAM(S): 5 TABLET ORAL at 14:41

## 2022-12-28 RX ADMIN — LACOSAMIDE 120 MILLIGRAM(S): 50 TABLET ORAL at 05:04

## 2022-12-28 RX ADMIN — Medication 81 MILLIGRAM(S): at 17:05

## 2022-12-28 RX ADMIN — OXYCODONE HYDROCHLORIDE 5 MILLIGRAM(S): 5 TABLET ORAL at 01:06

## 2022-12-28 RX ADMIN — CHLORHEXIDINE GLUCONATE 1 APPLICATION(S): 213 SOLUTION TOPICAL at 17:06

## 2022-12-28 RX ADMIN — OXYCODONE HYDROCHLORIDE 5 MILLIGRAM(S): 5 TABLET ORAL at 00:36

## 2022-12-28 RX ADMIN — LIDOCAINE 1 PATCH: 4 CREAM TOPICAL at 05:48

## 2022-12-28 RX ADMIN — LIDOCAINE 1 PATCH: 4 CREAM TOPICAL at 19:17

## 2022-12-28 RX ADMIN — OXYCODONE HYDROCHLORIDE 5 MILLIGRAM(S): 5 TABLET ORAL at 06:45

## 2022-12-28 RX ADMIN — LIDOCAINE 1 PATCH: 4 CREAM TOPICAL at 17:05

## 2022-12-28 RX ADMIN — Medication 1 TABLET(S): at 17:05

## 2022-12-28 NOTE — PROGRESS NOTE ADULT - ASSESSMENT
52-year-old male with a PMHx significant for TIAs (2011, 2013), CVAs x3 (02/2022 s/p tPA, 8/3/2022 s/p tPA, 11/5/2022 with residual expressive aphasia) on Eliquis, and HLD BIBEMS after being found unresponsive at home on the floor, last known well 12/1 at around noon. C-collar was placed. Patient was intubated in the ED for airway protection. CTH with no acute findings, vEEG with no identified seizures. Patient was weaned off pressors, extubated, and transitioned to floors 12/4/22. 12/5 am RRT called for seizure like activity with urinary incontinence, tongue biting, and R>L posturing, patient s/p ativan 2mg x3. Anesthesia called to RRT for intubation. MICU accepting patient for seizure like activity requiring intubation.      #AMS, Seizure like activity  EEG 12/7: concern for epileptiform activity; though EEG from 12/5 without such abnormalities.   - f/u neuro recs  - Patient found down and unresponsive at home on 12/2, last known well 12/1 at around noon. Intubated in ED 12/2, extubated in MICU 12/4  - RRT 12/5: seizure like acting with urinary incontinence, convulsions, and tongue biting; s/p ativan 2mg x3 with pauses in seizure activity following each; intubated for airway protection with rocuronium. Prolactin ~75   - CTH and CT cervical spine 12/2 negative for any acute pathologies. Tox screen on admission negative. 12/5: Stable exam from prior CT head, chronic microvascular ischemic changes, parenchymal loss, dystrophic calcification of central portion of alexis unchanged.  - Video EEG 12/3-12/4 without any seizure like activity, moderate to severe nonspecific diffuse or multifocal cerebral dysfunction  - c/w keppra 1500mg q12 maintenance, Keppra loaded 4.5g  - patient extubated again on 12/8th  -MRI noted, chronic CVA, no acute CVA noted           #CVAs/TIAs  - Hx of TIAs in 2011 and 2013, CVAs x3 in 02/22, 8/22, and 11/22 with residual expressive aphasia   - on Eliquis and Aspirin at home for hx of stroke  - PFO work up in past negative, no evidence of PFO on MIMI X2  - c/w ASA   - c/w heparin gtt, was on hold, resume eliquis   - Restart Statin when LFTs and CPK improve per neurology  - Being worked up for Mixed Connective Tissue Disease OP- f/u p-ANCA, c-ANCA/ [id number] negative  - Neuro recs appreciated  - repeat brain MRI   - LE pain, check MRI     # Hypernatremia  tredn Na level   renal eval appreciated  S/P hydraiton, trend Na level     #Thecal sac flattening   - CT thoracic and lumbar spine showing degenerative disc disease T6-T7 through L4-L5 and mild disc bulges at L2-L3 through L4-L5 that flatten the ventral thecal sac and narrowing of the bilateral neural foramina  - Will Monitor for now, will consider neuro/neurosurg evaluation in the future    #HLD  - atorvastatin on hold due to elevated CK and LFTs  - restart when possible for secondary stroke prevention    #?ASD/PFO  - Patient reportedly found to have a PFO in February 2022 during a stroke workup at [facility]. Patient presented for a PFO closure in November 2022. Notably, no PFO was found at the time and no intervention was performed by Dr. Lynn.    - TTE 11/5/22 EF 57% and grossly normal LV function  - MIMI performed bedside 12/5 1 of 2 studies (+) on bubble study (uploaded to [facility])        #Rhabdomyolysis  - CK 8720 on admission, peaked at 15k, now downtrending  - DDx: prolonged downtime myositis v hyperthermia? (T 105.4 F) v sepsis v seizure induced    - L Ankle adn Knee pain, swelling  S/P Tap last week  cont to have pain and tenderness and swelling  Repeat imaging   Ortho follow up         # Elevated Winston level  CHeck Abd US noted    GI eval PRN   Trend LFTs , normalized, no need for MRCP       #Partial SBO - resolved  - CT chest, abdomen, and pelvis w/ contrast concerning for possible partial SBO without transition point.  - Surgery consulted, no acute intervention at this time  - 12/5 KUB - negative for ileus or SBO  - Hold TF for 12/7 for possible trial of extubation.  - otherwise diet per nutrition      #Leukocytosis  - worsening leukocytosis  - monitor for fever  - Ortho eval fro knee swelling   - Pan Cx  - LP by IR , follow up results         #Concern for sepsis  Unclear source, aspiration v less likely meningitis   BCx (12/2 NGTD repeat 12/5 NGTD) and UC 12/2 NGTD  - Patient initially presented with AMS, T 105.4, tachycardic, and tachypneic   - UA negative  - s/p vanco and zosyn x1 in ED 12/2, ceftriaxone 2g BID 12/3-12/4, vanco 12/3-12/4  - 12/7 DCed Vanc since BCx from 12/5 NGTD  - c/w zosyn for aspiration pna presumed. completed course, monitor   - ID eval appreciated  - febrile again, Ortho for knee asp      # ANemia  noted, no gross bleeding  type and screen  GI eval     Discussed with patient's family over the phone      52-year-old male with a PMHx significant for TIAs (2011, 2013), CVAs x3 (02/2022 s/p tPA, 8/3/2022 s/p tPA, 11/5/2022 with residual expressive aphasia) on Eliquis, and HLD BIBEMS after being found unresponsive at home on the floor, last known well 12/1 at around noon. C-collar was placed. Patient was intubated in the ED for airway protection. CTH with no acute findings, vEEG with no identified seizures. Patient was weaned off pressors, extubated, and transitioned to floors 12/4/22. 12/5 am RRT called for seizure like activity with urinary incontinence, tongue biting, and R>L posturing, patient s/p ativan 2mg x3. Anesthesia called to RRT for intubation. MICU accepting patient for seizure like activity requiring intubation.      #AMS, Seizure like activity  EEG 12/7: concern for epileptiform activity; though EEG from 12/5 without such abnormalities.   - f/u neuro recs  - Patient found down and unresponsive at home on 12/2, last known well 12/1 at around noon. Intubated in ED 12/2, extubated in MICU 12/4  - RRT 12/5: seizure like acting with urinary incontinence, convulsions, and tongue biting; s/p ativan 2mg x3 with pauses in seizure activity following each; intubated for airway protection with rocuronium. Prolactin ~75   - CTH and CT cervical spine 12/2 negative for any acute pathologies. Tox screen on admission negative. 12/5: Stable exam from prior CT head, chronic microvascular ischemic changes, parenchymal loss, dystrophic calcification of central portion of alexis unchanged.  - Video EEG 12/3-12/4 without any seizure like activity, moderate to severe nonspecific diffuse or multifocal cerebral dysfunction  - c/w keppra 1500mg q12 maintenance, Keppra loaded 4.5g  - patient extubated again on 12/8th  -MRI noted, chronic CVA, no acute CVA noted           #CVAs/TIAs  - Hx of TIAs in 2011 and 2013, CVAs x3 in 02/22, 8/22, and 11/22 with residual expressive aphasia   - on Eliquis and Aspirin at home for hx of stroke  - PFO work up in past negative, no evidence of PFO on MIMI X2  - c/w ASA   - c/w heparin gtt, was on hold, resume eliquis   - Restart Statin when LFTs and CPK improve per neurology  - Being worked up for Mixed Connective Tissue Disease OP- f/u p-ANCA, c-ANCA/ [id number] negative  - Neuro recs appreciated  - repeat brain MRI   - LE pain, check MRI     # Hypernatremia  tredn Na level   renal eval appreciated  S/P hydraiton, trend Na level     #Thecal sac flattening   - CT thoracic and lumbar spine showing degenerative disc disease T6-T7 through L4-L5 and mild disc bulges at L2-L3 through L4-L5 that flatten the ventral thecal sac and narrowing of the bilateral neural foramina  - Will Monitor for now, will consider neuro/neurosurg evaluation in the future    #HLD  - atorvastatin on hold due to elevated CK and LFTs  - restart when possible for secondary stroke prevention    #?ASD/PFO  - Patient reportedly found to have a PFO in February 2022 during a stroke workup at [facility]. Patient presented for a PFO closure in November 2022. Notably, no PFO was found at the time and no intervention was performed by Dr. Lynn.    - TTE 11/5/22 EF 57% and grossly normal LV function  - MIMI performed bedside 12/5 1 of 2 studies (+) on bubble study (uploaded to [facility])  - elevated D d-andrew, Check LE DVT study       #Rhabdomyolysis  - CK 8720 on admission, peaked at 15k, now downtrending  - DDx: prolonged downtime myositis v hyperthermia? (T 105.4 F) v sepsis v seizure induced    - L Ankle adn Knee pain, swelling  S/P Tap last week  cont to have pain and tenderness and swelling  Repeat imaging   Ortho follow up         # Elevated Winston level  CHeck Abd US noted    GI eval PRN   Trend LFTs , normalized, no need for MRCP       #Partial SBO - resolved  - CT chest, abdomen, and pelvis w/ contrast concerning for possible partial SBO without transition point.  - Surgery consulted, no acute intervention at this time  - 12/5 KUB - negative for ileus or SBO  - Hold TF for 12/7 for possible trial of extubation.  - otherwise diet per nutrition      #Leukocytosis  - worsening leukocytosis  - monitor for fever  - Ortho eval fro knee swelling   - Pan Cx  - LP by IR , follow up results         #Concern for sepsis  Unclear source, aspiration v less likely meningitis   BCx (12/2 NGTD repeat 12/5 NGTD) and UC 12/2 NGTD  - Patient initially presented with AMS, T 105.4, tachycardic, and tachypneic   - UA negative  - s/p vanco and zosyn x1 in ED 12/2, ceftriaxone 2g BID 12/3-12/4, vanco 12/3-12/4  - 12/7 DCed Vanc since BCx from 12/5 NGTD  - c/w zosyn for aspiration pna presumed. completed course, monitor   - ID eval appreciated  - febrile again, Ortho for knee asp      # ANemia  noted, no gross bleeding  type and screen  GI eval     Discussed with patient's family over the phone

## 2022-12-28 NOTE — PROGRESS NOTE ADULT - ASSESSMENT
52-year-old  M known to me from outpatient setting with  TIAs (2011, 2013), Strokes x3 (02/2022 s/p tPA, 8/3/2022 s/p tPA, 11/5/2022 with residual expressive aphasia) on Eliquis, was on testosterone outpatient stopped after last stroke, HLD BIBEMS after being found unresponsive  Temp 105.4 on arrival tachy and /110. 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   CT cervical spine and maxillofacial scans negative.  CT chest, abdomen, and pelvis w/ contrast concerning for possible partial SBO without transition point. Surgery consulted in ED, no acute surgical intervention at this time.   CT thoracic and lumbar spine showing degenerative disc disease T6-T7 through L4-L5 and mild disc bulges at L2-L3 through L4-L5 that flatten the ventral thecal sac and narrowing of the bilateral neural foramina.  MRI 11/2022: R centrum semiovale and R posterior frontal infarcts   takes adderall at home   + rhabdo  EEG no seizures   + transaminitis   CTH 12/5 stable   outpatient hypercoag workup neg   12/5 extubated then reintibuated for seizure activity and tx back to ICU   EEG this AM 12/5 with only slowing   EEG 12/6 slowing but no seizures   spoke with Pippa Conti (friend) who states after he was taken off the testosterone he ordered a mail order testosterone supplement, unclear if he started it yet, unclear what this was  12/7 EEG no electrogtraphic seizures captured but R LPDs, rare L frontal discharges, severe GRDA.   12/8 EEG improved.  extubated. following some commands   12/9 moving uppers> lowers   12/11 AAOx2 uppers 4-5/5; not moving LLE well   MRI brain neg for new stroke   s/p L knee tap arthrocentesis  by ortho on 12/12   spoke with friend pippa conti - she counted his adderrall and didn't take extra tables. did find ashwagandha and red wood (bottle was sealed) supplement in his apartment   s/p LP 12/5 --> CSF glucose and protein WNL.  will f/u remaining studies. non infectious appearing   o/e AAOx2, slurred but 2/2 tongue bite   + transaminitis   more alert  12/20 neuro exam improving AAOx2-3   c/o L ankle pain in addition to knee  + COVID     Impression:   1) AMS of unclear etiology, possible now seizure, related to adderral withdrawal? 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 had MIMI was question of PFO but not found. s/p ILR   - on enhanced supervision   - covid precuations   - at some poitn consider repeat MRI brain w/ adn w/o prior to discharge    -  L ankle imaging --. podiatry.   - resume AC s/p LP when able given L knee --> eliquis 5mg BID restarted ; will consider stopping this in future  - possible MRCP planning for Monday. GI f/u.  elevated LFTs --> LFTs improved--> downtrending . MRCP deferred   - ortho for L knee s/p tap / arthrocentesis   -  Vimpat 100mg BID    - keppra 1500mg BID.   - fever on arrival, treatred initially for meningitis.  was on abx for aspiration PNA.  >LP done--> non  infectious   - IVF  - CPK elevated. trend improving   -   endocrine workup/eval   - there was some concern outpatient he may have a mixed  connective tissue disorder   - statin therapy for secondary stroke prevention when LFTS and CPK improved   - telemetry  - PT/OT/SS/SLP, OOBC  - check FS, glucose control <180  - GI/DVT ppx  - Thank you for allowing me to participate in the care of this patient. Call with questions.   - spoke with friend at bedside, Galilea 12/11   - spoke with Pippa Conti at [phone] for more collateral info and to provide update. spoke again 12/12 over phone. spoke 12/19   spoke with parents at bedside 12/20   d/c planning MUSA bender now covid auth good until 12/31   Braxton Forde MD  Vascular Neurology  Office: [phone]

## 2022-12-28 NOTE — SWALLOW BEDSIDE ASSESSMENT ADULT - SPECIFY REASON(S)
To reassess swallow function
to assess swallow mechanism
to assess swallow function
to assess swallow mechanism

## 2022-12-28 NOTE — PROGRESS NOTE ADULT - SUBJECTIVE AND OBJECTIVE BOX
Neurology Progress Note    S: Patient seen and examined ,    +_ covid precuations. on enhanced     Medication:  MEDICATIONS  (STANDING):  apixaban 5 milliGRAM(s) Oral two times a day  aspirin enteric coated 81 milliGRAM(s) Oral daily  bisacodyl Suppository 10 milliGRAM(s) Rectal daily  chlorhexidine 2% Cloths 1 Application(s) Topical daily  dextrose 50% Injectable 25 Gram(s) IV Push once  dextrose 50% Injectable 12.5 Gram(s) IV Push once  dextrose 50% Injectable 25 Gram(s) IV Push once  dextrose Oral Gel 15 Gram(s) Oral once  diphtheria/tetanus/pertussis (acellular) Vaccine (Adacel) 0.5 milliLiter(s) IntraMuscular once  glucagon  Injectable 1 milliGRAM(s) IntraMuscular once  lacosamide IVPB 100 milliGRAM(s) IV Intermittent every 12 hours  levETIRAcetam  IVPB 1500 milliGRAM(s) IV Intermittent every 12 hours  lidocaine   4% Patch 1 Patch Transdermal every 24 hours  multivitamin 1 Tablet(s) Oral daily    MEDICATIONS  (PRN):  ibuprofen  Tablet. 400 milliGRAM(s) Oral every 6 hours PRN Mild Pain (1 - 3), Moderate Pain (4 - 6)  oxyCODONE    IR 5 milliGRAM(s) Oral every 6 hours PRN moderate and severe pain  phenazopyridine 100 milliGRAM(s) Oral every 8 hours PRN dysuria       Vitals:    Vital Signs Last 24 Hrs  T(C): 36.2 (28 Dec 2022 05:00), Max: 36.9 (27 Dec 2022 20:37)  T(F): 97.2 (28 Dec 2022 05:00), Max: 98.5 (27 Dec 2022 20:37)  HR: 81 (28 Dec 2022 05:00) (81 - 88)  BP: 117/88 (28 Dec 2022 05:00) (106/76 - 120/94)  BP(mean): --  RR: 18 (28 Dec 2022 05:00) (17 - 18)  SpO2: 98% (28 Dec 2022 05:00) (97% - 100%)    Parameters below as of 28 Dec 2022 05:00  Patient On (Oxygen Delivery Method): room air             General Exam:   General Appearance: Appropriately dressed and in no acute distress       Head: Normocephalic, atraumatic and no dysmorphic features  Ear, Nose, and Throat: Moist mucous membranes    CVS: S1S2+  Resp: No SOB, no wheeze or rhonchi  Abd: soft NTND  Extremities: No edema, no cyanosis  Skin: No bruises, no rashes     Neurological Exam:    Mental Status: Awake, Oriented x 2-3, able to follow simple and complex verbal commands. answers questions appropriately, able to name, repeat and recall. no aphasia, mild dysarthria (tongue bite)   Cranial Nerves: EOMI, PERRLA, VFF, V1-V3 sensation intact, no facial asymmetry, tongue midline, hearing intact B/L, shoulder shrug appropriate, SCM/trap intact.   Motor: Moving uppers > lowers. uppers at least 4+-5/5 ; not moving LLE very well 2/2 knee pain otherwise intact    Sensation: intact to LT and PP   Reflexes: 1+ throughout at biceps, brachioradialis, triceps, patellars and ankles bilaterally and equal. No clonus. R toe and L toe were both downgoing.  Coordination: no dysmetria   Gait: unable     I personally reviewed the below data/images/labs:  CBC Full  -  ( 28 Dec 2022 05:16 )  WBC Count : 6.75 K/uL  RBC Count : 3.98 M/uL  Hemoglobin : 11.7 g/dL  Hematocrit : 36.7 %  Platelet Count - Automated : 385 K/uL  Mean Cell Volume : 92.2 fL  Mean Cell Hemoglobin : 29.4 pg  Mean Cell Hemoglobin Concentration : 31.9 gm/dL  Auto Neutrophil # : 3.82 K/uL  Auto Lymphocyte # : 1.80 K/uL  Auto Monocyte # : 0.63 K/uL  Auto Eosinophil # : 0.28 K/uL  Auto Basophil # : 0.05 K/uL  Auto Neutrophil % : 56.7 %  Auto Lymphocyte % : 26.7 %  Auto Monocyte % : 9.3 %  Auto Eosinophil % : 4.1 %  Auto Basophil % : 0.7 %    12-28    138  |  104  |  12  ----------------------------<  86  3.9   |  25  |  0.77    Ca    8.6      28 Dec 2022 05:16  Phos  4.2     12-28  Mg     2.00     12-28    TPro  6.7  /  Alb  2.9<L>  /  TBili  0.6  /  DBili  x   /  AST  25  /  ALT  21  /  AlkPhos  119  12-28        < from: CT Head No Cont (12.02.22 @ 20:27) >    ACC: [id number] EXAM:  CT CERVICAL SPINE                        ACC: [id number] EXAM:  CT MAXILLOFACIAL                        ACC: [id number] EXAM:  CT BRAIN                          PROCEDURE DATE:  12/02/2022        INTERPRETATION:  CLINICAL INDICATION: Trauma.    Technique: Noncontrast axial CT of the head, facial bones, and cervical   spine was performed. 3-D reformats of the facial bones was obtained.   Coronal and sagittal reformats were obtained.      COMPARISON: None.    FINDINGS:  Head CT:  The ventricles and sulci are within normal limits. 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 There is no intraparenchymal hematoma, mass effect   or midline shift. No abnormal extra-axial fluid collections or   hemorrhages are present.    There is swelling of the left lateral scalp. The calvarium is intact.   There are scattered mucosal inflammatory changes in the paranasal sinuses.      Cervical spine CT:  Alignment ismaintained. Vertebral bodies are normal in height, without   evidence of fracture or dislocation. Prevertebral soft tissues are within   normal limits without soft tissue swelling or hematoma.    Intervertebral discs are intact. Neural foramina and spinal canal are   intact.    The visualized lung apices are within normal limits.      Facial bone CT:    No acute facial fracture.    There are scattered mucosal inflammatory changes in the paranasal   sinuses. Mastoid air cells are clear.    Soft tissues appear unremarkable.        IMPRESSION:  CT HEAD: No acute abnormality.  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  CT CERVICAL SPINE: No acute abnormality  CT FACIAL BONE: No acute abnormality    --- End of Report ---       DELGADO IVAN MD; Attending Radiologist  This document has been electronically signed. Dec  2 2022  9:02PM    < end of copied text >  < from: CT Lumbar Spine No Cont (12.02.22 @ 20:27) >    ACC: [id number] EXAM:  CT LUMBAR SPINE                        ACC: [id number] EXAM:  CT THORACIC SPINE                          PROCEDURE DATE:  12/02/2022          INTERPRETATION:  CT thoracic and lumbar spine without IV contrast    CLINICAL INFORMATION:  Trauma  Back pain, fracture.    TECHNIQUE:  Contiguous axial 2 mm sections were obtained through the   thoracic and lumbar spine using a single helical acquisition.     Additional 2 mm sagittal and coronal reconstructions of the spine were   obtained. These additional reformatted images were used to evaluate the   spine for alignment, vertebral fractures and the integrity of the the   posterior elements.   This scan was performed using automatic exposure   control (radiation dose reduction software) to obtain a diagnostic image   quality scan with patient dose as low as reasonably achievable.    FINDINGS:   No prior similar studies are available for review    Thoracic and lumbar vertebral body heights are maintained. No vertebral   fracture is seen. No destructive bone lesion is found.  Alignment is   preserved.  Facet joints appear intact and aligned.    Thoracic and lumbar intervertebral disc spaces appear intact.   Degenerative disc disease and spondylosis is noted at T6-T7 throughL4-5   with loss of disc height and associated degenerative endplate changes.   Mild disc bulges at L2-3 through L4-5 flatten the ventral thecal sac and   narrow the BILATERAL neural foramina.    No paraspinal mass is recognized.  Paraspinal soft tissues appear intact.      IMPRESSION:  Degenerative disc disease and spondylosis is noted at T6-T7   through L4-5 with loss of disc height and associated degenerative   endplate changes. Mild disc bulges at L2-3 through L4-5 flatten the   ventral thecal sac and narrow the BILATERAL neural foramina   No   vertebral fracture is recognized.    --- End of Report ---       ANGIE ESCOBAR MD; Attending Radiologist  This document has been electronically signed. Dec  2 2022  8:55PM    < end of copied text >    EEG Classification / Summary:  Abnormal EEG in a comatose patient due to diffuse suppression gradually improving to discontinuous background, with right hemispheric relative attenuation/suppression. No epileptiform abnormalities or seizures are captured.    Clinical Impression:  Severe diffuse cerebral dysfunction that gradually improves is nonspecific in etiology. In this case, it may be related to sedating medication (propofol) with improvement after decreasing and stopping the medication.  Right hemispheric focal cerebral dysfunction can be structural or functional in etiology.      12/7  Clinical Impression:  -Occasional runs of right frontal lateralized periodic discharges (LPDs) at up to 1.3 Hz indicate a potentially epileptogenic focus in the right frontal region and are on the ictal-interictal continuum.  -A pattern on the ictal-interictal continuum is a pattern that does not qualify as an electrographic seizure or electrographic status epilepticus, but there is a reasonable chance that it may be contributing to impaired alertness, causing other clinical symptoms, and/or contributing to neuronal injury.  -Right hemispheric relative attenuation indicates right hemispheric focal cerebral dysfunction, which can be structural or functional in etiology.  -Rare left frontal epileptiform discharges indicate a potentially epileptogenic focus in this reigon  -Severe diffuse slowing and GRDA indicate severe diffuse cerebral dysfunction of nonspecific etiology.  -No electrographic seizures are captured.     < from: MR Head w/wo IV Cont (12.09.22 @ 19:54) >    ACC: [id number] EXAM:  MR BRAIN WAW IC                          PROCEDURE DATE:  12/09/2022          INTERPRETATION:  CLINICAL INDICATION: CVA, found unresponsive at home      Magnetic resonance imaging of the brain was carried out with transaxial   SPGR, FLAIR, fast spin echo T2 weighted images, axial susceptibility   weighted series, diffusion weighted series and sagittal T1 weighted   series on a 1.5 Mairtza magnet. Post contrast axial, coronal and sagittal   T1 weighted images were obtained. 10cc of Gadavist were intravenously   injected, 0 cc were discarded.      Comparison is made with the prior brain CT of 12/5/2022 and MRI 11/5/2022.    Mild ventricular and sulcal prominence is consistent with moderate   atrophy for the patient's age. No acute hemorrhage is identified. There   is a focus of hemosiderin within the alexis which is calcified on CT.   Scattered additional foci of hemosiderin deposition are identified in the   left frontal subcortical white matter and right occipital cortex is   nonspecific but may be related to multiple cavernoma is or hypertension.    There is a tiny focus of diffusion restriction in the right frontal   subcortical white matter and right centrum semiovale which are unchanged   since the prior exam and may represent subacute infarcts. Multiplicity   infarcts may be related to hypercoagulable state or cardioembolic events.    After contrast administration there is normal intracranial vascular   enhancement. No abnormal parenchymal or leptomeningeal enhancement.      IMPRESSION: Atrophy for the patient's age. Right frontal subcortical and   right centrum semiovale punctate infarcts are unchanged since 11/5/2022   and likely represent subacute infarcts. No abnormal enhancement. Focus of   calcification in the alexis with old hemosiderin. A few additional   scattered foci of hemosiderin deposition are unchanged.    --- End of Report ---            JUAN MANUEL CASTILLO MD; Attending Radiologist  This document has been electronically signed. Dec  9 2022  8:05PM    < end of copied text >

## 2022-12-28 NOTE — SWALLOW BEDSIDE ASSESSMENT ADULT - H & P REVIEW
[facility]    Case Management Progress Note      Patient:  Mariia Dixon    Date:  08/24/21     This visit is being performed virtually.  Clinician Location: [facility]  Mariia's Location: Home  Time spent in session: 45 minutes    The encounter diagnosis was Generalized anxiety disorder.    Problems:  Mariia and her mother reported concerns about Mariia's social and academic anxiety concerns, as well as Mariia's body image and self-esteem    Off-site:  No    Data: Clinician provided supportive services on behalf of patient consistent with  Treatment Plan goals and objectives.    Assessment:  Completed an IM+CANS reassessment (core, addendum 1, addendum 2)    Plan: Continue to support patient’s efforts and progress towards establishing treatment plan goals in the following ways:  Psychoeducation on anxiety and coping skills, CBT triangle exercises to identity automatic thoughts, reframe thoughts into balanced thoughts, self-esteem building activities     Next appointment scheduled for: 9/7/2021 at 5 PM        
yes

## 2022-12-28 NOTE — SWALLOW BEDSIDE ASSESSMENT ADULT - COMMENTS
Neurology 12/28 "52-year-old  M known to me from outpatient setting with  TIAs (2011, 2013), Strokes x3 (02/2022 s/p tPA, 8/3/2022 s/p tPA, 11/5/2022 with residual expressive aphasia) on Eliquis, was on testosterone outpatient stopped after last stroke, HLD BIBEMS after being found unresponsive Temp 105.4 on arrival tachy and /110. intubated "    of note: patient seen for clinical swallow assessments on 12/9, 12/12 and 12/14. patient completed a Cinesophagram on 12/16 with recommendations of easy to chew with thin liquids.    Patient seen at bedside this afternoon for a reassessment of the swallow function, at which time patient was alert. patient able to follow directives and verbalizes wants/needs.
Per internal medicine note 12/8, "52-year-old male with a PMHx significant for TIAs (2011, 2013), CVAs x3 (02/2022 s/p tPA, 8/3/2022 s/p tPA, 11/5/2022 with residual expressive aphasia) on Eliquis, and HLD BIBEMS after being found unresponsive at home on the floor, last known well 12/1 at around noon. C-collar was placed. Patient was intubated in the ED for airway protection. CTH with no acute findings, vEEG with no identified seizures. Patient was weaned off pressors, extubated, and transitioned to floors 12/4/22. 12/5 am RRT called for seizure like activity with urinary incontinence, tongue biting, and R>L posturing, patient s/p ativan 2mg x3. Anesthesia called to RRT for intubation. MICU accepting patient for seizure like activity requiring intubation."     Per neurology note 12/9, "Impression:  1) AMS of unclear etiology, possible now seizure, related to adderral withdrawal? 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 had MIMI was question of PFO but not found. s/p ILR"    CXR 12/5: "The lungs show minimal left atelectasis and there is no evidence of pneumothorax nor pleural effusion."    Patient is known to this service with bedside swallow evaluation most recently completed on 8/4/22 with recommendation of regular solids and thin liquids.    Patient seen at bedside, awake and alert, for clinical swallow evaluation. Patient is s/p extubation 12/8. Residual expressive aphasia noted. Vocal quality is judged to be hoarse with adequate vocal volume. Baseline cough noted. Patient was cooperative and receptive to PO trials.
Per Internal Medicine Note 12/11/22: "52-year-old male with a PMHx significant for TIAs (2011, 2013), CVAs x3 (02/2022 s/p tPA, 8/3/2022 s/p tPA, 11/5/2022 with residual expressive aphasia) on Eliquis, and HLD BIBEMS after being found unresponsive at home on the floor, last known well 12/1 at around noon. C-collar was placed. Patient was intubated in the ED for airway protection. CTH with no acute findings, vEEG with no identified seizures. Patient was weaned off pressors, extubated, and transitioned to floors 12/4/22. 12/5 am RRT called for seizure like activity with urinary incontinence, tongue biting, and R>L posturing, patient s/p ativan 2mg x3. Anesthesia called to RRT for intubation. MICU accepting patient for seizure like activity requiring intubation."    Per Neurology Note 12/12/22: "52-year-old  M known to me from outpatient setting with  TIAs (2011, 2013), Strokes x3 (02/2022 s/p tPA, 8/3/2022 s/p tPA, 11/5/2022 with residual expressive aphasia) on Eliquis, was on testosterone outpatient stopped after last stroke, HLD BIBEMS after being found unresponsive"     Patient is familiar to this department as the patient was seen for an initial swallow evaluation on 12/9/22. See consult.     Patient received supine in bed with bilateral wrist constraints (mittens) in place. Patient with inconsistent ability to follow 1-step directives and noted with expressive language deficits (as documented in EMR). Patient answering simple yes/no questions via head nod.
Per Internal Medicine Note 12/13/22: "52-year-old male with a PMHx significant for TIAs (2011, 2013), CVAs x3 (02/2022 s/p tPA, 8/3/2022 s/p tPA, 11/5/2022 with residual expressive aphasia) on Eliquis, and HLD BIBEMS after being found unresponsive at home on the floor, last known well 12/1 at around noon. C-collar was placed. Patient was intubated in the ED for airway protection. CTH with no acute findings, vEEG with no identified seizures. Patient was weaned off pressors, extubated, and transitioned to floors 12/4/22. 12/5 am RRT called for seizure like activity with urinary incontinence, tongue biting, and R>L posturing, patient s/p ativan 2mg x3. Anesthesia called to RRT for intubation. MICU accepting patient for seizure like activity requiring intubation"     Patient is familiar to this department as the patient was seen for an initial swallow evaluation on 12/9 and repeat swallow evaluation on 12/12. See consult for details.    Patient received upright in bed, AAOx4. patient able to follow multi-step commands and answer yes/no questions. However, at end of the evaluation, patient noted with confusion and indicated "I need to drive to rehearsal tonight".

## 2022-12-28 NOTE — SWALLOW BEDSIDE ASSESSMENT ADULT - SWALLOW EVAL: DIAGNOSIS
1.) mild oral dysphagia for puree, mildly-thick and moderately-thick liquids marked by adequate acceptance and containment, instances of bolus holding, delayed A-P transport and adequate oral clearance. 2.) Severe pharyngeal stage dysphagia for puree, mildly-thick and moderately-thick liquids marked by a suspected delay in initiation of pharyngeal swallow, hyolaryngeal excursion present upon palpation, multiple swallows per bolus, increase in coughing immediately and delayed across trials and increased work of breath with intake.
1. Functional oral stage for regular solids and thin liquids marked by adequate oral acceptance, chewing and transfer. 2. functional pharyngeal phase for regular solids and thin liquids marked by a present pharyngeal swallow trigger with hyolaryngeal elevation upon digital palpation without evidence of airway penetration/aspiration.
Patient presents with oropharyngeal dysphagia given thin liquids, mildly thick liquids, moderately thick liquids and puree marked by adequate bolus containment, slowed bolus manipulation with bolus holding (~5 seconds) with slowed anterior-posterior transport and adequate oral clearance post primary swallow. Pharyngeal stage marked by observed initiation of the pharyngeal swallow trigger judged via laryngeal palpation with multiple swallows (~5-6 swallows) following puree and change in vocal quality to "wet" tone" likely indicative of pharyngeal clearance deficit and impaired airway protection, Immediate cough response noted following mildly thick and thin liquid trials, likely indicative of impaired airway protection.
Patient presents with oropharyngeal dysphagia given thin liquids, mildly thick liquids, puree, minced & moist and sot & bite sized marked by adequate bolus containment, slowed bolus manipulation, slowed mastication with adequate ability to break down minced & moist and soft & bite sized solids and slowed anterior-posterior transport. Mild lingual residue noted post primary swallow for soft & bite sized solids requiring multiple mildly thick liquid washes to clear. Pharyngeal stage marked by observed initiation of the pharyngeal swallow trigger judged via laryngeal palpation. Inconsistent cough response noted following thin liquids, possibly indicative of impaired airway protection. No overt s/sx of impaired airway protection observed given puree, mildly thick liquids, minced & moist and soft & bite sized solids.

## 2022-12-28 NOTE — SWALLOW BEDSIDE ASSESSMENT ADULT - ADDITIONAL RECOMMENDATIONS
1. this service to follow up as schedule permits for diet tolerance. 2. medical team advised to reconsult this service if change in medical status and/or change in neurological status that may impact oral intake.

## 2022-12-28 NOTE — SWALLOW BEDSIDE ASSESSMENT ADULT - SWALLOW EVAL: CURRENT DIET
easy to chew solids with thin liquids, per MD order
NPO pending formal swallow evaluation per MD order
NPO
NPO pending formal swallow evaluation per MD order

## 2022-12-28 NOTE — SWALLOW BEDSIDE ASSESSMENT ADULT - ASR SWALLOW RECOMMEND DIAG
Objective testing is not warranted at this time given poor endurance and performance across trials.
objective testing NOT warranted given no overt signs of penetration/aspiration and given recent Cinesophagram completed on 12/16 with recommendations of easy to chew with thin liquids.
objective testing is NOT indicated at this time given overt s/sx of impaired airway protection across all PO trials.
Cinesophagram to objectively assess swallow mechanism given concern that sepsis is aspiration related, multiple CVAS/Tias and intubations/extubations./VFSS/MBS

## 2022-12-28 NOTE — PROGRESS NOTE ADULT - SUBJECTIVE AND OBJECTIVE BOX
Name of Patient : TAMI DUNHAM  MRN: [id number]  Date of visit: 22       Subjective: Patient seen and examined. No new events except as noted.   Doing okay   poor oral intake     REVIEW OF SYSTEMS:    CONSTITUTIONAL: POor oral intake   EYES/ENT: No visual changes;  No vertigo or throat pain   NECK: No pain or stiffness  RESPIRATORY: No cough, wheezing, hemoptysis; No shortness of breath  CARDIOVASCULAR: No chest pain or palpitations  GASTROINTESTINAL: No abdominal or epigastric pain. No nausea, vomiting, or hematemesis; No diarrhea or constipation. No melena or hematochezia.  GENITOURINARY: No dysuria, frequency or hematuria  NEUROLOGICAL: No numbness or weakness  SKIN: No itching, burning, rashes, or lesions   All other review of systems is negative unless indicated above.    MEDICATIONS:  MEDICATIONS  (STANDING):  apixaban 5 milliGRAM(s) Oral two times a day  aspirin enteric coated 81 milliGRAM(s) Oral daily  atorvastatin 40 milliGRAM(s) Oral at bedtime  bisacodyl Suppository 10 milliGRAM(s) Rectal daily  chlorhexidine 2% Cloths 1 Application(s) Topical daily  dextrose 50% Injectable 25 Gram(s) IV Push once  dextrose 50% Injectable 12.5 Gram(s) IV Push once  dextrose 50% Injectable 25 Gram(s) IV Push once  dextrose Oral Gel 15 Gram(s) Oral once  diphtheria/tetanus/pertussis (acellular) Vaccine (Adacel) 0.5 milliLiter(s) IntraMuscular once  glucagon  Injectable 1 milliGRAM(s) IntraMuscular once  lacosamide IVPB 100 milliGRAM(s) IV Intermittent every 12 hours  levETIRAcetam  IVPB 1500 milliGRAM(s) IV Intermittent every 12 hours  lidocaine   4% Patch 1 Patch Transdermal every 24 hours  multivitamin 1 Tablet(s) Oral daily      PHYSICAL EXAM:  T(C): 36.4 (22 @ 13:00), Max: 36.9 (22 @ 20:37)  HR: 89 (22 @ 13:00) (81 - 89)  BP: 116/79 (22 @ 13:00) (106/76 - 120/94)  RR: 17 (22 @ 13:00) (17 - 18)  SpO2: 100% (22 @ 13:00) (97% - 100%)  Wt(kg): --  I&O's Summary    28 Dec 2022 07:01  -  28 Dec 2022 16:37  --------------------------------------------------------  IN: 0 mL / OUT: 300 mL / NET: -300 mL          Appearance: Normal	  HEENT:  PERRLA   Lymphatic: No lymphadenopathy   Cardiovascular: Normal S1 S2, no JVD  Respiratory: normal effort , clear  Gastrointestinal:  Soft, Non-tender  Skin: No rashes,  warm to touch  Psychiatry:  Mood & affect appropriate  Musculuskeletal: Knee and ankle pain       All labs, Imaging and EKGs personally reviewed     22 @ 07:01  -  22 @ 16:37  --------------------------------------------------------  IN: 0 mL / OUT: 300 mL / NET: -300 mL                          11.7   6.75  )-----------( 385      ( 28 Dec 2022 05:16 )             36.7                   138  |  104  |  12  ----------------------------<  86  3.9   |  25  |  0.77    Ca    8.6      28 Dec 2022 05:16  Phos  4.2       Mg     2.00         TPro  6.7  /  Alb  2.9<L>  /  TBili  0.6  /  DBili  x   /  AST  25  /  ALT  21  /  AlkPhos  119                         Urinalysis Basic - ( 26 Dec 2022 16:48 )    Color: Light Yellow / Appearance: Clear / S.014 / pH: x  Gluc: x / Ketone: Negative  / Bili: Negative / Urobili: <2 mg/dL   Blood: x / Protein: Negative / Nitrite: Negative   Leuk Esterase: Negative / RBC: x / WBC x   Sq Epi: x / Non Sq Epi: x / Bacteria: x

## 2022-12-28 NOTE — PROGRESS NOTE ADULT - SUBJECTIVE AND OBJECTIVE BOX
Subjective: Patient seen and examined. No new events except as noted.     REVIEW OF SYSTEMS:    CONSTITUTIONAL: No weakness, fevers or chills  EYES/ENT: No visual changes;  No vertigo or throat pain   NECK: No pain or stiffness  RESPIRATORY: No cough, wheezing, hemoptysis; No shortness of breath  CARDIOVASCULAR: No chest pain or palpitations  GASTROINTESTINAL: No abdominal or epigastric pain. No nausea, vomiting, or hematemesis; No diarrhea or constipation. No melena or hematochezia.  GENITOURINARY: No dysuria, frequency or hematuria  NEUROLOGICAL: No numbness or weakness  SKIN: No itching, burning, rashes, or lesions   All other review of systems is negative unless indicated above.    MEDICATIONS:  MEDICATIONS  (STANDING):  apixaban 5 milliGRAM(s) Oral two times a day  aspirin enteric coated 81 milliGRAM(s) Oral daily  bisacodyl Suppository 10 milliGRAM(s) Rectal daily  chlorhexidine 2% Cloths 1 Application(s) Topical daily  dextrose 50% Injectable 25 Gram(s) IV Push once  dextrose 50% Injectable 12.5 Gram(s) IV Push once  dextrose 50% Injectable 25 Gram(s) IV Push once  dextrose Oral Gel 15 Gram(s) Oral once  diphtheria/tetanus/pertussis (acellular) Vaccine (Adacel) 0.5 milliLiter(s) IntraMuscular once  glucagon  Injectable 1 milliGRAM(s) IntraMuscular once  lacosamide IVPB 100 milliGRAM(s) IV Intermittent every 12 hours  levETIRAcetam  IVPB 1500 milliGRAM(s) IV Intermittent every 12 hours  lidocaine   4% Patch 1 Patch Transdermal every 24 hours  multivitamin 1 Tablet(s) Oral daily      PHYSICAL EXAM:  T(C): 36.2 (12-28-22 @ 05:00), Max: 36.9 (12-27-22 @ 20:37)  HR: 81 (12-28-22 @ 05:00) (81 - 88)  BP: 117/88 (12-28-22 @ 05:00) (106/76 - 120/94)  RR: 18 (12-28-22 @ 05:00) (17 - 18)  SpO2: 98% (12-28-22 @ 05:00) (97% - 100%)  Wt(kg): --  I&O's Summary        Appearance: NAD  HEENT:  Dry oral mucosa, PERRL, EOMI	  Lymphatic: No lymphadenopathy , no edema  Cardiovascular: Normal S1 S2, No JVD, No murmurs , Peripheral pulses palpable 2+ bilaterally  Respiratory: Lungs clear to auscultation, normal effort 	  Gastrointestinal:  Soft, Non-tender, + BS	  Skin: No rashes, No ecchymoses, No cyanosis, warm to touch  Musculoskeletal: Normal range of motion, normal strength  Psychiatry:  Sleepy   Ext: No edema      LABS:    CARDIAC MARKERS:                                11.7   6.75  )-----------( 385      ( 28 Dec 2022 05:16 )             36.7     12-28    138  |  104  |  12  ----------------------------<  86  3.9   |  25  |  0.77    Ca    8.6      28 Dec 2022 05:16  Phos  4.2     12-28  Mg     2.00     12-28    TPro  6.7  /  Alb  2.9<L>  /  TBili  0.6  /  DBili  x   /  AST  25  /  ALT  21  /  AlkPhos  119  12-28    proBNP:   Lipid Profile:   HgA1c:   TSH:     Negative          TELEMETRY: 	    ECG:  	  RADIOLOGY:   DIAGNOSTIC TESTING:  [ ] Echocardiogram:  [ ]  Catheterization:  [ ] Stress Test:    OTHER:

## 2022-12-29 LAB
ALBUMIN SERPL ELPH-MCNC: 3 G/DL — LOW (ref 3.3–5)
ALP SERPL-CCNC: 117 U/L — SIGNIFICANT CHANGE UP (ref 40–120)
ALT FLD-CCNC: 24 U/L — SIGNIFICANT CHANGE UP (ref 4–41)
ANION GAP SERPL CALC-SCNC: 11 MMOL/L — SIGNIFICANT CHANGE UP (ref 7–14)
APPEARANCE UR: CLEAR — SIGNIFICANT CHANGE UP
AST SERPL-CCNC: 19 U/L — SIGNIFICANT CHANGE UP (ref 4–40)
BASOPHILS # BLD AUTO: 0.07 K/UL — SIGNIFICANT CHANGE UP (ref 0–0.2)
BASOPHILS NFR BLD AUTO: 0.4 % — SIGNIFICANT CHANGE UP (ref 0–2)
BILIRUB SERPL-MCNC: 0.6 MG/DL — SIGNIFICANT CHANGE UP (ref 0.2–1.2)
BILIRUB UR-MCNC: NEGATIVE — SIGNIFICANT CHANGE UP
BUN SERPL-MCNC: 13 MG/DL — SIGNIFICANT CHANGE UP (ref 7–23)
CALCIUM SERPL-MCNC: 8.5 MG/DL — SIGNIFICANT CHANGE UP (ref 8.4–10.5)
CHLORIDE SERPL-SCNC: 101 MMOL/L — SIGNIFICANT CHANGE UP (ref 98–107)
CO2 SERPL-SCNC: 24 MMOL/L — SIGNIFICANT CHANGE UP (ref 22–31)
COLOR SPEC: YELLOW — SIGNIFICANT CHANGE UP
CREAT SERPL-MCNC: 0.75 MG/DL — SIGNIFICANT CHANGE UP (ref 0.5–1.3)
DIFF PNL FLD: NEGATIVE — SIGNIFICANT CHANGE UP
EGFR: 109 ML/MIN/1.73M2 — SIGNIFICANT CHANGE UP
EOSINOPHIL # BLD AUTO: 0.26 K/UL — SIGNIFICANT CHANGE UP (ref 0–0.5)
EOSINOPHIL NFR BLD AUTO: 1.6 % — SIGNIFICANT CHANGE UP (ref 0–6)
GLUCOSE SERPL-MCNC: 88 MG/DL — SIGNIFICANT CHANGE UP (ref 70–99)
GLUCOSE UR QL: NEGATIVE — SIGNIFICANT CHANGE UP
HCT VFR BLD CALC: 35.8 % — LOW (ref 39–50)
HGB BLD-MCNC: 11.7 G/DL — LOW (ref 13–17)
IANC: 12.87 K/UL — HIGH (ref 1.8–7.4)
IMM GRANULOCYTES NFR BLD AUTO: 1.4 % — HIGH (ref 0–0.9)
KETONES UR-MCNC: NEGATIVE — SIGNIFICANT CHANGE UP
LEUKOCYTE ESTERASE UR-ACNC: NEGATIVE — SIGNIFICANT CHANGE UP
LYMPHOCYTES # BLD AUTO: 1.19 K/UL — SIGNIFICANT CHANGE UP (ref 1–3.3)
LYMPHOCYTES # BLD AUTO: 7.5 % — LOW (ref 13–44)
MAGNESIUM SERPL-MCNC: 1.8 MG/DL — SIGNIFICANT CHANGE UP (ref 1.6–2.6)
MCHC RBC-ENTMCNC: 30.3 PG — SIGNIFICANT CHANGE UP (ref 27–34)
MCHC RBC-ENTMCNC: 32.7 GM/DL — SIGNIFICANT CHANGE UP (ref 32–36)
MCV RBC AUTO: 92.7 FL — SIGNIFICANT CHANGE UP (ref 80–100)
MONOCYTES # BLD AUTO: 1.24 K/UL — HIGH (ref 0–0.9)
MONOCYTES NFR BLD AUTO: 7.8 % — SIGNIFICANT CHANGE UP (ref 2–14)
NEUTROPHILS # BLD AUTO: 12.87 K/UL — HIGH (ref 1.8–7.4)
NEUTROPHILS NFR BLD AUTO: 81.3 % — HIGH (ref 43–77)
NITRITE UR-MCNC: NEGATIVE — SIGNIFICANT CHANGE UP
NRBC # BLD: 0 /100 WBCS — SIGNIFICANT CHANGE UP (ref 0–0)
NRBC # FLD: 0 K/UL — SIGNIFICANT CHANGE UP (ref 0–0)
PH UR: 6 — SIGNIFICANT CHANGE UP (ref 5–8)
PHOSPHATE SERPL-MCNC: 3.6 MG/DL — SIGNIFICANT CHANGE UP (ref 2.5–4.5)
PLATELET # BLD AUTO: 367 K/UL — SIGNIFICANT CHANGE UP (ref 150–400)
POTASSIUM SERPL-MCNC: 3.6 MMOL/L — SIGNIFICANT CHANGE UP (ref 3.5–5.3)
POTASSIUM SERPL-SCNC: 3.6 MMOL/L — SIGNIFICANT CHANGE UP (ref 3.5–5.3)
PROT SERPL-MCNC: 6.9 G/DL — SIGNIFICANT CHANGE UP (ref 6–8.3)
PROT UR-MCNC: NEGATIVE — SIGNIFICANT CHANGE UP
RBC # BLD: 3.86 M/UL — LOW (ref 4.2–5.8)
RBC # FLD: 14.6 % — HIGH (ref 10.3–14.5)
SODIUM SERPL-SCNC: 136 MMOL/L — SIGNIFICANT CHANGE UP (ref 135–145)
SP GR SPEC: 1.01 — SIGNIFICANT CHANGE UP (ref 1.01–1.05)
UROBILINOGEN FLD QL: SIGNIFICANT CHANGE UP
WBC # BLD: 15.85 K/UL — HIGH (ref 3.8–10.5)
WBC # FLD AUTO: 15.85 K/UL — HIGH (ref 3.8–10.5)

## 2022-12-29 PROCEDURE — 70553 MRI BRAIN STEM W/O & W/DYE: CPT | Mod: 26

## 2022-12-29 RX ADMIN — CHLORHEXIDINE GLUCONATE 1 APPLICATION(S): 213 SOLUTION TOPICAL at 13:19

## 2022-12-29 RX ADMIN — LACOSAMIDE 120 MILLIGRAM(S): 50 TABLET ORAL at 18:17

## 2022-12-29 RX ADMIN — LEVETIRACETAM 400 MILLIGRAM(S): 250 TABLET, FILM COATED ORAL at 05:43

## 2022-12-29 RX ADMIN — TRAMADOL HYDROCHLORIDE 25 MILLIGRAM(S): 50 TABLET ORAL at 06:22

## 2022-12-29 RX ADMIN — OXYCODONE HYDROCHLORIDE 5 MILLIGRAM(S): 5 TABLET ORAL at 18:51

## 2022-12-29 RX ADMIN — TRAMADOL HYDROCHLORIDE 25 MILLIGRAM(S): 50 TABLET ORAL at 05:42

## 2022-12-29 RX ADMIN — Medication 1 TABLET(S): at 13:17

## 2022-12-29 RX ADMIN — OXYCODONE HYDROCHLORIDE 5 MILLIGRAM(S): 5 TABLET ORAL at 00:00

## 2022-12-29 RX ADMIN — Medication 81 MILLIGRAM(S): at 13:18

## 2022-12-29 RX ADMIN — LEVETIRACETAM 400 MILLIGRAM(S): 250 TABLET, FILM COATED ORAL at 17:44

## 2022-12-29 RX ADMIN — APIXABAN 5 MILLIGRAM(S): 2.5 TABLET, FILM COATED ORAL at 05:43

## 2022-12-29 RX ADMIN — OXYCODONE HYDROCHLORIDE 5 MILLIGRAM(S): 5 TABLET ORAL at 23:37

## 2022-12-29 RX ADMIN — Medication 10 MILLIGRAM(S): at 13:19

## 2022-12-29 RX ADMIN — OXYCODONE HYDROCHLORIDE 5 MILLIGRAM(S): 5 TABLET ORAL at 17:44

## 2022-12-29 RX ADMIN — APIXABAN 5 MILLIGRAM(S): 2.5 TABLET, FILM COATED ORAL at 17:52

## 2022-12-29 RX ADMIN — LIDOCAINE 1 PATCH: 4 CREAM TOPICAL at 05:22

## 2022-12-29 RX ADMIN — LACOSAMIDE 120 MILLIGRAM(S): 50 TABLET ORAL at 05:43

## 2022-12-29 NOTE — PROGRESS NOTE ADULT - SUBJECTIVE AND OBJECTIVE BOX
Name of Patient : TAMI DUNHAM  MRN: [id number]  Date of visit: 12-29-22 @ 14:47      Subjective: Patient seen and examined. No new events except as noted.   Doing okay       REVIEW OF SYSTEMS:    CONSTITUTIONAL: No weakness, fevers or chills  EYES/ENT: No visual changes;  No vertigo or throat pain   NECK: No pain or stiffness  RESPIRATORY: No cough, wheezing, hemoptysis; No shortness of breath  CARDIOVASCULAR: No chest pain or palpitations  GASTROINTESTINAL: No abdominal or epigastric pain. No nausea, vomiting, or hematemesis; No diarrhea or constipation. No melena or hematochezia.  GENITOURINARY: No dysuria, frequency or hematuria  NEUROLOGICAL: L Knee pain   SKIN: No itching, burning, rashes, or lesions   All other review of systems is negative unless indicated above.    MEDICATIONS:  MEDICATIONS  (STANDING):  apixaban 5 milliGRAM(s) Oral two times a day  aspirin enteric coated 81 milliGRAM(s) Oral daily  atorvastatin 40 milliGRAM(s) Oral at bedtime  bisacodyl Suppository 10 milliGRAM(s) Rectal daily  chlorhexidine 2% Cloths 1 Application(s) Topical daily  dextrose 50% Injectable 25 Gram(s) IV Push once  dextrose 50% Injectable 12.5 Gram(s) IV Push once  dextrose 50% Injectable 25 Gram(s) IV Push once  dextrose Oral Gel 15 Gram(s) Oral once  diphtheria/tetanus/pertussis (acellular) Vaccine (Adacel) 0.5 milliLiter(s) IntraMuscular once  glucagon  Injectable 1 milliGRAM(s) IntraMuscular once  lacosamide IVPB 100 milliGRAM(s) IV Intermittent every 12 hours  levETIRAcetam  IVPB 1500 milliGRAM(s) IV Intermittent every 12 hours  lidocaine   4% Patch 1 Patch Transdermal every 24 hours  multivitamin 1 Tablet(s) Oral daily      PHYSICAL EXAM:  T(C): 36.6 (12-29-22 @ 07:40), Max: 36.9 (12-29-22 @ 05:13)  HR: 91 (12-29-22 @ 07:40) (91 - 99)  BP: 119/79 (12-29-22 @ 07:40) (119/79 - 126/77)  RR: 17 (12-29-22 @ 07:40) (17 - 18)  SpO2: 100% (12-29-22 @ 07:40) (97% - 100%)  Wt(kg): --  I&O's Summary    28 Dec 2022 07:01  -  29 Dec 2022 07:00  --------------------------------------------------------  IN: 0 mL / OUT: 300 mL / NET: -300 mL          Appearance: Normal	  HEENT:  PERRLA   Lymphatic: No lymphadenopathy   Cardiovascular: Normal S1 S2, no JVD  Respiratory: normal effort , clear  Gastrointestinal:  Soft, Non-tender  Skin: No rashes,  warm to touch  Psychiatry:  Mood & affect appropriate  Musculuskeletal: No edema      All labs, Imaging and EKGs personally reviewed     12-28-22 @ 07:01  -  12-29-22 @ 07:00  --------------------------------------------------------  IN: 0 mL / OUT: 300 mL / NET: -300 mL                          11.7   15.85 )-----------( 367      ( 29 Dec 2022 05:15 )             35.8               12-29    136  |  101  |  13  ----------------------------<  88  3.6   |  24  |  0.75    Ca    8.5      29 Dec 2022 05:15  Phos  3.6     12-29  Mg     1.80     12-29    TPro  6.9  /  Alb  3.0<L>  /  TBili  0.6  /  DBili  x   /  AST  19  /  ALT  24  /  AlkPhos  117  12-29

## 2022-12-29 NOTE — PROGRESS NOTE ADULT - ASSESSMENT
52-year-old  M known to me from outpatient setting with  TIAs (2011, 2013), Strokes x3 (02/2022 s/p tPA, 8/3/2022 s/p tPA, 11/5/2022 with residual expressive aphasia) on Eliquis, was on testosterone outpatient stopped after last stroke, HLD BIBEMS after being found unresponsive  Temp 105.4 on arrival tachy and /110. 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   CT cervical spine and maxillofacial scans negative.  CT chest, abdomen, and pelvis w/ contrast concerning for possible partial SBO without transition point. Surgery consulted in ED, no acute surgical intervention at this time.   CT thoracic and lumbar spine showing degenerative disc disease T6-T7 through L4-L5 and mild disc bulges at L2-L3 through L4-L5 that flatten the ventral thecal sac and narrowing of the bilateral neural foramina.  MRI 11/2022: R centrum semiovale and R posterior frontal infarcts   takes adderall at home   + rhabdo  EEG no seizures   + transaminitis   CTH 12/5 stable   outpatient hypercoag workup neg   12/5 extubated then reintibuated for seizure activity and tx back to ICU   EEG this AM 12/5 with only slowing   EEG 12/6 slowing but no seizures   spoke with Pippa Conti (friend) who states after he was taken off the testosterone he ordered a mail order testosterone supplement, unclear if he started it yet, unclear what this was  12/7 EEG no electrogtraphic seizures captured but R LPDs, rare L frontal discharges, severe GRDA.   12/8 EEG improved.  extubated. following some commands   12/9 moving uppers> lowers   12/11 AAOx2 uppers 4-5/5; not moving LLE well   MRI brain neg for new stroke   s/p L knee tap arthrocentesis  by ortho on 12/12   spoke with friend pippa conti - she counted his adderrall and didn't take extra tables. did find ashwagandha and red wood (bottle was sealed) supplement in his apartment   s/p LP 12/5 --> CSF glucose and protein WNL.  will f/u remaining studies. non infectious appearing   o/e AAOx2, slurred but 2/2 tongue bite   + transaminitis   more alert  12/20 neuro exam improving AAOx2-3   c/o L ankle pain in addition to knee  + COVID     Impression:   1) AMS of unclear etiology, possible now seizure, related to adderral withdrawal? 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 had MIMI was question of PFO but not found. s/p ILR   - on enhanced supervision   - covid precuations   - at some poitn consider repeat MRI brain w/ adn w/o prior to discharge    -  L ankle imaging --. podiatry.   - resume AC s/p LP when able given L knee --> eliquis 5mg BID restarted ; will consider stopping this in future  - possible MRCP planning for Monday. GI f/u.  elevated LFTs --> LFTs improved--> downtrending . MRCP deferred   - ortho for L knee s/p tap / arthrocentesis   -  Vimpat 100mg BID    - keppra 1500mg BID.   - fever on arrival, treatred initially for meningitis.  was on abx for aspiration PNA.  >LP done--> non  infectious   - IVF  - CPK elevated. trend improving   -   endocrine workup/eval   - there was some concern outpatient he may have a mixed  connective tissue disorder   - statin therapy for secondary stroke prevention when LFTS and CPK improved   - telemetry  - PT/OT/SS/SLP, OOBC  - check FS, glucose control <180  - GI/DVT ppx  - Thank you for allowing me to participate in the care of this patient. Call with questions.   - spoke with friend at bedside, [place] 12/11   - spoke with Pippa Conti at [phone] for more collateral info and to provide update. spoke again 12/12 over phone. spoke 12/19   spoke with parents at bedside 12/20   d/c planning [facility]USA bender now covid auth good until 12/31   Braxton Forde MD  Vascular Neurology  Office: [phone]

## 2022-12-29 NOTE — PROGRESS NOTE ADULT - SUBJECTIVE AND OBJECTIVE BOX
Neurology Progress Note    S: Patient seen and examined ,    +_ covid precuations. on enhanced     Medication:  MEDICATIONS  (STANDING):  apixaban 5 milliGRAM(s) Oral two times a day  aspirin enteric coated 81 milliGRAM(s) Oral daily  atorvastatin 40 milliGRAM(s) Oral at bedtime  bisacodyl Suppository 10 milliGRAM(s) Rectal daily  chlorhexidine 2% Cloths 1 Application(s) Topical daily  dextrose 50% Injectable 25 Gram(s) IV Push once  dextrose 50% Injectable 12.5 Gram(s) IV Push once  dextrose 50% Injectable 25 Gram(s) IV Push once  dextrose Oral Gel 15 Gram(s) Oral once  diphtheria/tetanus/pertussis (acellular) Vaccine (Adacel) 0.5 milliLiter(s) IntraMuscular once  glucagon  Injectable 1 milliGRAM(s) IntraMuscular once  lacosamide IVPB 100 milliGRAM(s) IV Intermittent every 12 hours  levETIRAcetam  IVPB 1500 milliGRAM(s) IV Intermittent every 12 hours  lidocaine   4% Patch 1 Patch Transdermal every 24 hours  multivitamin 1 Tablet(s) Oral daily    MEDICATIONS  (PRN):  oxyCODONE    IR 5 milliGRAM(s) Oral every 6 hours PRN moderate and severe pain  phenazopyridine 100 milliGRAM(s) Oral every 8 hours PRN dysuria  traMADol 25 milliGRAM(s) Oral every 6 hours PRN Moderate Pain (4 - 6)        Vitals:      Vital Signs Last 24 Hrs  T(C): 36.6 (12-29-22 @ 07:40), Max: 36.9 (12-29-22 @ 05:13)  T(F): 97.9 (12-29-22 @ 07:40), Max: 98.4 (12-29-22 @ 05:13)  HR: 91 (12-29-22 @ 07:40) (89 - 99)  BP: 119/79 (12-29-22 @ 07:40) (116/79 - 126/77)  BP(mean): --  RR: 17 (12-29-22 @ 07:40) (17 - 18)  SpO2: 100% (12-29-22 @ 07:40) (97% - 100%)             General Exam:   General Appearance: Appropriately dressed and in no acute distress       Head: Normocephalic, atraumatic and no dysmorphic features  Ear, Nose, and Throat: Moist mucous membranes    CVS: S1S2+  Resp: No SOB, no wheeze or rhonchi  Abd: soft NTND  Extremities: No edema, no cyanosis  Skin: No bruises, no rashes     Neurological Exam:    Mental Status: Awake, Oriented x 2-3, able to follow simple and complex verbal commands. answers questions appropriately, able to name, repeat and recall. no aphasia, mild dysarthria (tongue bite)   Cranial Nerves: EOMI, PERRLA, VFF, V1-V3 sensation intact, no facial asymmetry, tongue midline, hearing intact B/L, shoulder shrug appropriate, SCM/trap intact.   Motor: Moving uppers > lowers. uppers at least 4+-5/5 ; not moving LLE very well 2/2 knee pain otherwise intact    Sensation: intact to LT and PP   Reflexes: 1+ throughout at biceps, brachioradialis, triceps, patellars and ankles bilaterally and equal. No clonus. R toe and L toe were both downgoing.  Coordination: no dysmetria   Gait: unable     I personally reviewed the below data/images/labs:  CBC Full  -  ( 29 Dec 2022 05:15 )  WBC Count : 15.85 K/uL  RBC Count : 3.86 M/uL  Hemoglobin : 11.7 g/dL  Hematocrit : 35.8 %  Platelet Count - Automated : 367 K/uL  Mean Cell Volume : 92.7 fL  Mean Cell Hemoglobin : 30.3 pg  Mean Cell Hemoglobin Concentration : 32.7 gm/dL  Auto Neutrophil # : 12.87 K/uL  Auto Lymphocyte # : 1.19 K/uL  Auto Monocyte # : 1.24 K/uL  Auto Eosinophil # : 0.26 K/uL  Auto Basophil # : 0.07 K/uL  Auto Neutrophil % : 81.3 %  Auto Lymphocyte % : 7.5 %  Auto Monocyte % : 7.8 %  Auto Eosinophil % : 1.6 %  Auto Basophil % : 0.4 %    12-29    136  |  101  |  13  ----------------------------<  88  3.6   |  24  |  0.75    Ca    8.5      29 Dec 2022 05:15  Phos  3.6     12-29  Mg     1.80     12-29    TPro  6.9  /  Alb  3.0<L>  /  TBili  0.6  /  DBili  x   /  AST  19  /  ALT  24  /  AlkPhos  117  12-29      < from: CT Head No Cont (12.02.22 @ 20:27) >    ACC: [id number] EXAM:  CT CERVICAL SPINE                        ACC: [id number] EXAM:  CT MAXILLOFACIAL                        ACC: [id number] EXAM:  CT BRAIN                          PROCEDURE DATE:  12/02/2022        INTERPRETATION:  CLINICAL INDICATION: Trauma.    Technique: Noncontrast axial CT of the head, facial bones, and cervical   spine was performed. 3-D reformats of the facial bones was obtained.   Coronal and sagittal reformats were obtained.      COMPARISON: None.    FINDINGS:  Head CT:  The ventricles and sulci are within normal limits. 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 There is no intraparenchymal hematoma, mass effect   or midline shift. No abnormal extra-axial fluid collections or   hemorrhages are present.    There is swelling of the left lateral scalp. The calvarium is intact.   There are scattered mucosal inflammatory changes in the paranasal sinuses.      Cervical spine CT:  Alignment ismaintained. Vertebral bodies are normal in height, without   evidence of fracture or dislocation. Prevertebral soft tissues are within   normal limits without soft tissue swelling or hematoma.    Intervertebral discs are intact. Neural foramina and spinal canal are   intact.    The visualized lung apices are within normal limits.      Facial bone CT:    No acute facial fracture.    There are scattered mucosal inflammatory changes in the paranasal   sinuses. Mastoid air cells are clear.    Soft tissues appear unremarkable.        IMPRESSION:  CT HEAD: No acute abnormality.  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  CT CERVICAL SPINE: No acute abnormality  CT FACIAL BONE: No acute abnormality    --- End of Report ---       DELGADO IVAN MD; Attending Radiologist  This document has been electronically signed. Dec  2 2022  9:02PM    < end of copied text >  < from: CT Lumbar Spine No Cont (12.02.22 @ 20:27) >    ACC: [id number] EXAM:  CT LUMBAR SPINE                        ACC: [id number] EXAM:  CT THORACIC SPINE                          PROCEDURE DATE:  12/02/2022          INTERPRETATION:  CT thoracic and lumbar spine without IV contrast    CLINICAL INFORMATION:  Trauma  Back pain, fracture.    TECHNIQUE:  Contiguous axial 2 mm sections were obtained through the   thoracic and lumbar spine using a single helical acquisition.     Additional 2 mm sagittal and coronal reconstructions of the spine were   obtained. These additional reformatted images were used to evaluate the   spine for alignment, vertebral fractures and the integrity of the the   posterior elements.   This scan was performed using automatic exposure   control (radiation dose reduction software) to obtain a diagnostic image   quality scan with patient dose as low as reasonably achievable.    FINDINGS:   No prior similar studies are available for review    Thoracic and lumbar vertebral body heights are maintained. No vertebral   fracture is seen. No destructive bone lesion is found.  Alignment is   preserved.  Facet joints appear intact and aligned.    Thoracic and lumbar intervertebral disc spaces appear intact.   Degenerative disc disease and spondylosis is noted at T6-T7 throughL4-5   with loss of disc height and associated degenerative endplate changes.   Mild disc bulges at L2-3 through L4-5 flatten the ventral thecal sac and   narrow the BILATERAL neural foramina.    No paraspinal mass is recognized.  Paraspinal soft tissues appear intact.      IMPRESSION:  Degenerative disc disease and spondylosis is noted at T6-T7   through L4-5 with loss of disc height and associated degenerative   endplate changes. Mild disc bulges at L2-3 through L4-5 flatten the   ventral thecal sac and narrow the BILATERAL neural foramina   No   vertebral fracture is recognized.    --- End of Report ---       ANGIE ESCOBAR MD; Attending Radiologist  This document has been electronically signed. Dec  2 2022  8:55PM    < end of copied text >    EEG Classification / Summary:  Abnormal EEG in a comatose patient due to diffuse suppression gradually improving to discontinuous background, with right hemispheric relative attenuation/suppression. No epileptiform abnormalities or seizures are captured.    Clinical Impression:  Severe diffuse cerebral dysfunction that gradually improves is nonspecific in etiology. In this case, it may be related to sedating medication (propofol) with improvement after decreasing and stopping the medication.  Right hemispheric focal cerebral dysfunction can be structural or functional in etiology.      12/7  Clinical Impression:  -Occasional runs of right frontal lateralized periodic discharges (LPDs) at up to 1.3 Hz indicate a potentially epileptogenic focus in the right frontal region and are on the ictal-interictal continuum.  -A pattern on the ictal-interictal continuum is a pattern that does not qualify as an electrographic seizure or electrographic status epilepticus, but there is a reasonable chance that it may be contributing to impaired alertness, causing other clinical symptoms, and/or contributing to neuronal injury.  -Right hemispheric relative attenuation indicates right hemispheric focal cerebral dysfunction, which can be structural or functional in etiology.  -Rare left frontal epileptiform discharges indicate a potentially epileptogenic focus in this reigon  -Severe diffuse slowing and GRDA indicate severe diffuse cerebral dysfunction of nonspecific etiology.  -No electrographic seizures are captured.     < from: MR Head w/wo IV Cont (12.09.22 @ 19:54) >    ACC: [id number] EXAM:  MR BRAIN WAW IC                          PROCEDURE DATE:  12/09/2022          INTERPRETATION:  CLINICAL INDICATION: CVA, found unresponsive at home      Magnetic resonance imaging of the brain was carried out with transaxial   SPGR, FLAIR, fast spin echo T2 weighted images, axial susceptibility   weighted series, diffusion weighted series and sagittal T1 weighted   series on a 1.5 Maritza magnet. Post contrast axial, coronal and sagittal   T1 weighted images were obtained. 10cc of Gadavist were intravenously   injected, 0 cc were discarded.      Comparison is made with the prior brain CT of 12/5/2022 and MRI 11/5/2022.    Mild ventricular and sulcal prominence is consistent with moderate   atrophy for the patient's age. No acute hemorrhage is identified. There   is a focus of hemosiderin within the alexis which is calcified on CT.   Scattered additional foci of hemosiderin deposition are identified in the   left frontal subcortical white matter and right occipital cortex is   nonspecific but may be related to multiple cavernoma is or hypertension.    There is a tiny focus of diffusion restriction in the right frontal   subcortical white matter and right centrum semiovale which are unchanged   since the prior exam and may represent subacute infarcts. Multiplicity   infarcts may be related to hypercoagulable state or cardioembolic events.    After contrast administration there is normal intracranial vascular   enhancement. No abnormal parenchymal or leptomeningeal enhancement.      IMPRESSION: Atrophy for the patient's age. Right frontal subcortical and   right centrum semiovale punctate infarcts are unchanged since 11/5/2022   and likely represent subacute infarcts. No abnormal enhancement. Focus of   calcification in the alexis with old hemosiderin. A few additional   scattered foci of hemosiderin deposition are unchanged.    --- End of Report ---            JUAN MANUEL CASTILLO MD; Attending Radiologist  This document has been electronically signed. Dec  9 2022  8:05PM    < end of copied text >

## 2022-12-29 NOTE — CHART NOTE - NSCHARTNOTEFT_GEN_A_CORE
Pt seen for CONSULT / EDUCATION     Medical Course: 52-year-old male with a PMHx significant for TIAs (2011, 2013), CVAs x3 (02/2022 s/p tPA, 8/3/2022 s/p tPA, 11/5/2022 with residual expressive aphasia) on Eliquis, and HLD BIBEMS after being found PRIYA unresponsive at home.      Nutrition Course: Nutrition interview: No recent episodes of nausea, vomiting, diarrhea or constipation, BM noted on 12/29 per Pt.  Denies any chewing/swallowing difficulties. Diet advanced to Regular per SLP recommendations 12/28. Food preferences explored and noted. Intake is % per RN flowsheets and per pt. Feeding skills:  independent. Pt receives menus and fills them in to receive menu options that he will enjoy.     EDUCATION: RD provided pt with verbal and written education regarding heart healthy nutrition therapy, (foods recommended- fruits, vegetables, whole grains, low intake of sodium and low fat dairy, foods to avoid- fried fatty foods, salty foods, foods high in refined sugars, and a sample meal plan was provided).  Pt confirmed understanding and compliance to this information.       Diet Prescription: Diet, Regular:   DASH/TLC {Sodium & Cholesterol Restricted} (DASH) (12-28-22 @ 15:37)    Pertinent Medications: MEDICATIONS  (STANDING):  apixaban 5 milliGRAM(s) Oral two times a day  aspirin enteric coated 81 milliGRAM(s) Oral daily  atorvastatin 40 milliGRAM(s) Oral at bedtime  bisacodyl Suppository 10 milliGRAM(s) Rectal daily  chlorhexidine 2% Cloths 1 Application(s) Topical daily  dextrose 50% Injectable 25 Gram(s) IV Push once  dextrose 50% Injectable 12.5 Gram(s) IV Push once  dextrose 50% Injectable 25 Gram(s) IV Push once  dextrose Oral Gel 15 Gram(s) Oral once  diphtheria/tetanus/pertussis (acellular) Vaccine (Adacel) 0.5 milliLiter(s) IntraMuscular once  glucagon  Injectable 1 milliGRAM(s) IntraMuscular once  lacosamide IVPB 100 milliGRAM(s) IV Intermittent every 12 hours  levETIRAcetam  IVPB 1500 milliGRAM(s) IV Intermittent every 12 hours  lidocaine   4% Patch 1 Patch Transdermal every 24 hours  multivitamin 1 Tablet(s) Oral daily    MEDICATIONS  (PRN):  oxyCODONE    IR 5 milliGRAM(s) Oral every 6 hours PRN moderate and severe pain  phenazopyridine 100 milliGRAM(s) Oral every 8 hours PRN dysuria  traMADol 25 milliGRAM(s) Oral every 6 hours PRN Moderate Pain (4 - 6)    Pertinent Labs: 12-29 Na136 mmol/L Glu 88 mg/dL K+ 3.6 mmol/L Cr  0.75 mg/dL BUN 13 mg/dL 12-29 Phos 3.6 mg/dL 12-29 Alb 3.0 g/dL<L>        Weight: Height (cm): 190.5 (12-02 @ 19:44)  Weight (kg): 95.7 (12-03 @ 00:25), 95.3 (11-22 @ 08:56), 95.3 (11-04 @ 16:01)  BMI (kg/m2): 26.4 (12-03 @ 00:25)  Weight Assessment: Pt with stable wt gain x1 month (+0.4%)      Physical Assessment, per flowsheets:  Edema: None noted  Skin: intact     Estimated Needs:   [X] No change since previous assessment    Previous Nutrition Diagnosis: Inadeqate Energy Intake  Altered GI function---> partial SBO.  NPO status.  Nutrition Diagnosis is [ ] ongoing  [x] resolved [ ] not applicable     New Nutrition Diagnosis: [x ] not applicable     Interventions:   1) Continue on current PO diet rx: DASH/TLC   2) Continue on multivitamin once daily for micronutrient provisions   3) RD to f/u prn     Monitor & Evaluate:  PO intake, tolerance to diet/supplement, nutrition related lab values, weight trends, BMs/GI distress, hydration status, skin integrity.    Izzy Bassett MS, RDN (Pager #[phone]) | Also available on TEAMS

## 2022-12-29 NOTE — PROGRESS NOTE ADULT - ASSESSMENT
52-year-old male with a PMHx significant for TIAs (2011, 2013), CVAs x3 (02/2022 s/p tPA, 8/3/2022 s/p tPA, 11/5/2022 with residual expressive aphasia) on Eliquis, and HLD BIBEMS after being found unresponsive at home on the floor, last known well 12/1 at around noon. C-collar was placed. Patient was intubated in the ED for airway protection. CTH with no acute findings, vEEG with no identified seizures. Patient was weaned off pressors, extubated, and transitioned to floors 12/4/22. 12/5 am RRT called for seizure like activity with urinary incontinence, tongue biting, and R>L posturing, patient s/p ativan 2mg x3. Anesthesia called to RRT for intubation. MICU accepting patient for seizure like activity requiring intubation.      #AMS, Seizure like activity  EEG 12/7: concern for epileptiform activity; though EEG from 12/5 without such abnormalities.   - f/u neuro recs  - Patient found down and unresponsive at home on 12/2, last known well 12/1 at around noon. Intubated in ED 12/2, extubated in MICU 12/4  - RRT 12/5: seizure like acting with urinary incontinence, convulsions, and tongue biting; s/p ativan 2mg x3 with pauses in seizure activity following each; intubated for airway protection with rocuronium. Prolactin ~75   - CTH and CT cervical spine 12/2 negative for any acute pathologies. Tox screen on admission negative. 12/5: Stable exam from prior CT head, chronic microvascular ischemic changes, parenchymal loss, dystrophic calcification of central portion of alexis unchanged.  - Video EEG 12/3-12/4 without any seizure like activity, moderate to severe nonspecific diffuse or multifocal cerebral dysfunction  - c/w keppra 1500mg q12 maintenance, Keppra loaded 4.5g  - patient extubated again on 12/8th  -MRI noted, chronic CVA, no acute CVA noted           #CVAs/TIAs  - Hx of TIAs in 2011 and 2013, CVAs x3 in 02/22, 8/22, and 11/22 with residual expressive aphasia   - on Eliquis and Aspirin at home for hx of stroke  - PFO work up in past negative, no evidence of PFO on MIMI X2  - c/w ASA   - c/w heparin gtt, was on hold, resume eliquis   - Restart Statin when LFTs and CPK improve per neurology  - Being worked up for Mixed Connective Tissue Disease OP- f/u p-ANCA, c-ANCA/ [id number] negative  - Neuro recs appreciated  - repeat brain MRI   - LE pain, check MRI     # Hypernatremia  tredn Na level   renal eval appreciated  S/P hydraiton, trend Na level     #Thecal sac flattening   - CT thoracic and lumbar spine showing degenerative disc disease T6-T7 through L4-L5 and mild disc bulges at L2-L3 through L4-L5 that flatten the ventral thecal sac and narrowing of the bilateral neural foramina  - Will Monitor for now, will consider neuro/neurosurg evaluation in the future    #HLD  - atorvastatin on hold due to elevated CK and LFTs  - restart when possible for secondary stroke prevention    #?ASD/PFO  - Patient reportedly found to have a PFO in February 2022 during a stroke workup at [place]. Patient presented for a PFO closure in November 2022. Notably, no PFO was found at the time and no intervention was performed by Dr. Lynn.    - TTE 11/5/22 EF 57% and grossly normal LV function  - MIMI performed bedside 12/5 1 of 2 studies (+) on bubble study (uploaded to [facility])  - elevated D d-andrew, Check LE DVT study       #Rhabdomyolysis  - CK 8720 on admission, peaked at 15k, now downtrending  - DDx: prolonged downtime myositis v hyperthermia? (T 105.4 F) v sepsis v seizure induced    - L Ankle adn Knee pain, swelling  S/P Tap last week  cont to have pain and tenderness and swelling  Repeat imaging   Ortho follow up         # Elevated Winston level  CHeck Abd US noted    GI eval PRN   Trend LFTs , normalized, no need for MRCP       #Partial SBO - resolved  - CT chest, abdomen, and pelvis w/ contrast concerning for possible partial SBO without transition point.  - Surgery consulted, no acute intervention at this time  - 12/5 KUB - negative for ileus or SBO  - Hold TF for 12/7 for possible trial of extubation.  - otherwise diet per nutrition      #Leukocytosis  - worsening leukocytosis  - monitor for fever  - Ortho eval fro knee swelling   - Pan Cx  - LP by IR , follow up results         #Concern for sepsis  Unclear source, aspiration v less likely meningitis   BCx (12/2 NGTD repeat 12/5 NGTD) and UC 12/2 NGTD  - Patient initially presented with AMS, T 105.4, tachycardic, and tachypneic   - UA negative  - s/p vanco and zosyn x1 in ED 12/2, ceftriaxone 2g BID 12/3-12/4, vanco 12/3-12/4  - 12/7 DCed Vanc since BCx from 12/5 NGTD  - c/w zosyn for aspiration pna presumed. completed course, monitor   - ID eval appreciated  - febrile again, Ortho for knee asp      # ANemia  noted, no gross bleeding  type and screen  GI eval     Discussed with patient's family over the phone

## 2022-12-29 NOTE — PROGRESS NOTE ADULT - SUBJECTIVE AND OBJECTIVE BOX
Subjective: Patient seen and examined. No new events except as noted.     REVIEW OF SYSTEMS:    CONSTITUTIONAL: +weakness, fevers or chills  EYES/ENT: No visual changes;  No vertigo or throat pain   NECK: No pain or stiffness  RESPIRATORY: No cough, wheezing, hemoptysis; No shortness of breath  CARDIOVASCULAR: No chest pain or palpitations  GASTROINTESTINAL: No abdominal or epigastric pain. No nausea, vomiting, or hematemesis; No diarrhea or constipation. No melena or hematochezia.  GENITOURINARY: No dysuria, frequency or hematuria  NEUROLOGICAL: No numbness or weakness  SKIN: No itching, burning, rashes, or lesions   All other review of systems is negative unless indicated above.    MEDICATIONS:  MEDICATIONS  (STANDING):  apixaban 5 milliGRAM(s) Oral two times a day  aspirin enteric coated 81 milliGRAM(s) Oral daily  atorvastatin 40 milliGRAM(s) Oral at bedtime  bisacodyl Suppository 10 milliGRAM(s) Rectal daily  chlorhexidine 2% Cloths 1 Application(s) Topical daily  dextrose 50% Injectable 25 Gram(s) IV Push once  dextrose 50% Injectable 12.5 Gram(s) IV Push once  dextrose 50% Injectable 25 Gram(s) IV Push once  dextrose Oral Gel 15 Gram(s) Oral once  diphtheria/tetanus/pertussis (acellular) Vaccine (Adacel) 0.5 milliLiter(s) IntraMuscular once  glucagon  Injectable 1 milliGRAM(s) IntraMuscular once  lacosamide IVPB 100 milliGRAM(s) IV Intermittent every 12 hours  levETIRAcetam  IVPB 1500 milliGRAM(s) IV Intermittent every 12 hours  lidocaine   4% Patch 1 Patch Transdermal every 24 hours  multivitamin 1 Tablet(s) Oral daily      PHYSICAL EXAM:  T(C): 36.9 (12-29-22 @ 05:13), Max: 36.9 (12-29-22 @ 05:13)  HR: 99 (12-29-22 @ 05:13) (89 - 99)  BP: 125/71 (12-29-22 @ 05:13) (116/79 - 126/77)  RR: 17 (12-29-22 @ 05:13) (17 - 18)  SpO2: 97% (12-29-22 @ 05:13) (97% - 100%)  Wt(kg): --  I&O's Summary    28 Dec 2022 07:01  -  29 Dec 2022 07:00  --------------------------------------------------------  IN: 0 mL / OUT: 300 mL / NET: -300 mL          Appearance: Normal	  HEENT:   Normal oral mucosa, PERRL, EOMI	  Lymphatic: No lymphadenopathy , no edema  Cardiovascular: Normal S1 S2, No JVD, No murmurs , Peripheral pulses palpable 2+ bilaterally  Respiratory: Lungs clear to auscultation, normal effort 	  Gastrointestinal:  Soft, Non-tender, + BS	  Skin: No rashes, No ecchymoses, No cyanosis, warm to touch  Musculoskeletal: Normal range of motion, normal strength  Psychiatry:  Mood & affect appropriate  Ext: No edema      LABS:    CARDIAC MARKERS:                                11.7   15.85 )-----------( 367      ( 29 Dec 2022 05:15 )             35.8     12-29    136  |  101  |  13  ----------------------------<  88  3.6   |  24  |  0.75    Ca    8.5      29 Dec 2022 05:15  Phos  3.6     12-29  Mg     1.80     12-29    TPro  6.9  /  Alb  3.0<L>  /  TBili  0.6  /  DBili  x   /  AST  19  /  ALT  24  /  AlkPhos  117  12-29    proBNP:   Lipid Profile:   HgA1c:   TSH:     Negative          TELEMETRY: 	    ECG:  	  RADIOLOGY:   DIAGNOSTIC TESTING:  [ ] Echocardiogram:  [ ]  Catheterization:  [ ] Stress Test:    OTHER:

## 2022-12-30 LAB
24R-OH-CALCIDIOL SERPL-MCNC: 47.2 NG/ML — SIGNIFICANT CHANGE UP (ref 30–80)
ANION GAP SERPL CALC-SCNC: 9 MMOL/L — SIGNIFICANT CHANGE UP (ref 7–14)
BUN SERPL-MCNC: 13 MG/DL — SIGNIFICANT CHANGE UP (ref 7–23)
CALCIUM SERPL-MCNC: 8.4 MG/DL — SIGNIFICANT CHANGE UP (ref 8.4–10.5)
CHLORIDE SERPL-SCNC: 103 MMOL/L — SIGNIFICANT CHANGE UP (ref 98–107)
CO2 SERPL-SCNC: 28 MMOL/L — SIGNIFICANT CHANGE UP (ref 22–31)
CREAT SERPL-MCNC: 0.84 MG/DL — SIGNIFICANT CHANGE UP (ref 0.5–1.3)
EGFR: 105 ML/MIN/1.73M2 — SIGNIFICANT CHANGE UP
GLUCOSE SERPL-MCNC: 85 MG/DL — SIGNIFICANT CHANGE UP (ref 70–99)
HCT VFR BLD CALC: 35.4 % — LOW (ref 39–50)
HGB BLD-MCNC: 11.4 G/DL — LOW (ref 13–17)
MAGNESIUM SERPL-MCNC: 2 MG/DL — SIGNIFICANT CHANGE UP (ref 1.6–2.6)
MCHC RBC-ENTMCNC: 30.1 PG — SIGNIFICANT CHANGE UP (ref 27–34)
MCHC RBC-ENTMCNC: 32.2 GM/DL — SIGNIFICANT CHANGE UP (ref 32–36)
MCV RBC AUTO: 93.4 FL — SIGNIFICANT CHANGE UP (ref 80–100)
NRBC # BLD: 0 /100 WBCS — SIGNIFICANT CHANGE UP (ref 0–0)
NRBC # FLD: 0 K/UL — SIGNIFICANT CHANGE UP (ref 0–0)
PHOSPHATE SERPL-MCNC: 4.1 MG/DL — SIGNIFICANT CHANGE UP (ref 2.5–4.5)
PLATELET # BLD AUTO: 296 K/UL — SIGNIFICANT CHANGE UP (ref 150–400)
POTASSIUM SERPL-MCNC: 3.9 MMOL/L — SIGNIFICANT CHANGE UP (ref 3.5–5.3)
POTASSIUM SERPL-SCNC: 3.9 MMOL/L — SIGNIFICANT CHANGE UP (ref 3.5–5.3)
RBC # BLD: 3.79 M/UL — LOW (ref 4.2–5.8)
RBC # FLD: 14.6 % — HIGH (ref 10.3–14.5)
SODIUM SERPL-SCNC: 140 MMOL/L — SIGNIFICANT CHANGE UP (ref 135–145)
WBC # BLD: 7.91 K/UL — SIGNIFICANT CHANGE UP (ref 3.8–10.5)
WBC # FLD AUTO: 7.91 K/UL — SIGNIFICANT CHANGE UP (ref 3.8–10.5)

## 2022-12-30 PROCEDURE — 99222 1ST HOSP IP/OBS MODERATE 55: CPT

## 2022-12-30 RX ADMIN — LEVETIRACETAM 400 MILLIGRAM(S): 250 TABLET, FILM COATED ORAL at 06:59

## 2022-12-30 RX ADMIN — LACOSAMIDE 120 MILLIGRAM(S): 50 TABLET ORAL at 18:40

## 2022-12-30 RX ADMIN — ATORVASTATIN CALCIUM 40 MILLIGRAM(S): 80 TABLET, FILM COATED ORAL at 21:40

## 2022-12-30 RX ADMIN — LEVETIRACETAM 400 MILLIGRAM(S): 250 TABLET, FILM COATED ORAL at 18:01

## 2022-12-30 RX ADMIN — OXYCODONE HYDROCHLORIDE 5 MILLIGRAM(S): 5 TABLET ORAL at 07:19

## 2022-12-30 RX ADMIN — OXYCODONE HYDROCHLORIDE 5 MILLIGRAM(S): 5 TABLET ORAL at 16:12

## 2022-12-30 RX ADMIN — CHLORHEXIDINE GLUCONATE 1 APPLICATION(S): 213 SOLUTION TOPICAL at 13:12

## 2022-12-30 RX ADMIN — Medication 81 MILLIGRAM(S): at 12:33

## 2022-12-30 RX ADMIN — APIXABAN 5 MILLIGRAM(S): 2.5 TABLET, FILM COATED ORAL at 06:59

## 2022-12-30 RX ADMIN — OXYCODONE HYDROCHLORIDE 5 MILLIGRAM(S): 5 TABLET ORAL at 07:38

## 2022-12-30 RX ADMIN — OXYCODONE HYDROCHLORIDE 5 MILLIGRAM(S): 5 TABLET ORAL at 22:59

## 2022-12-30 RX ADMIN — OXYCODONE HYDROCHLORIDE 5 MILLIGRAM(S): 5 TABLET ORAL at 17:10

## 2022-12-30 RX ADMIN — APIXABAN 5 MILLIGRAM(S): 2.5 TABLET, FILM COATED ORAL at 18:01

## 2022-12-30 RX ADMIN — Medication 1 TABLET(S): at 12:32

## 2022-12-30 RX ADMIN — LIDOCAINE 1 PATCH: 4 CREAM TOPICAL at 18:02

## 2022-12-30 RX ADMIN — LACOSAMIDE 120 MILLIGRAM(S): 50 TABLET ORAL at 06:58

## 2022-12-30 RX ADMIN — Medication 10 MILLIGRAM(S): at 12:32

## 2022-12-30 RX ADMIN — LIDOCAINE 1 PATCH: 4 CREAM TOPICAL at 18:51

## 2022-12-30 NOTE — PROGRESS NOTE ADULT - PROBLEM SELECTOR PLAN 1
found unresponsive at home   CTH with no acute findings  initially vEEG with no identified seizures  intubated 12/2, extubated 12/4  MRI H - right frontal subcortical and right centrum semiovale infarcts unchanged found unresponsive at home   CTH with no acute findings  s/p repeat MRI. ? systemic lymphoma or sarcoid   initially vEEG with no identified seizures  intubated 12/2, extubated 12/4  MRI H - right frontal subcortical and right centrum semiovale infarcts unchanged

## 2022-12-30 NOTE — PROGRESS NOTE ADULT - SUBJECTIVE AND OBJECTIVE BOX
Subjective: Patient seen and examined. No new events except as noted.   s/p repeat MRI       REVIEW OF SYSTEMS:    CONSTITUTIONAL: + weakness, fevers or chills  EYES/ENT: No visual changes;  No vertigo or throat pain   NECK: No pain or stiffness  RESPIRATORY: No cough, wheezing, hemoptysis; No shortness of breath  CARDIOVASCULAR: No chest pain or palpitations  GASTROINTESTINAL: No abdominal or epigastric pain. No nausea, vomiting, or hematemesis; No diarrhea or constipation. No melena or hematochezia.  GENITOURINARY: No dysuria, frequency or hematuria  NEUROLOGICAL: No numbness or weakness  SKIN: No itching, burning, rashes, or lesions   All other review of systems is negative unless indicated above.    MEDICATIONS:  MEDICATIONS  (STANDING):  apixaban 5 milliGRAM(s) Oral two times a day  aspirin enteric coated 81 milliGRAM(s) Oral daily  atorvastatin 40 milliGRAM(s) Oral at bedtime  bisacodyl Suppository 10 milliGRAM(s) Rectal daily  chlorhexidine 2% Cloths 1 Application(s) Topical daily  dextrose 50% Injectable 25 Gram(s) IV Push once  dextrose 50% Injectable 12.5 Gram(s) IV Push once  dextrose 50% Injectable 25 Gram(s) IV Push once  dextrose Oral Gel 15 Gram(s) Oral once  diphtheria/tetanus/pertussis (acellular) Vaccine (Adacel) 0.5 milliLiter(s) IntraMuscular once  glucagon  Injectable 1 milliGRAM(s) IntraMuscular once  lacosamide IVPB 100 milliGRAM(s) IV Intermittent every 12 hours  levETIRAcetam  IVPB 1500 milliGRAM(s) IV Intermittent every 12 hours  lidocaine   4% Patch 1 Patch Transdermal every 24 hours  multivitamin 1 Tablet(s) Oral daily      PHYSICAL EXAM:  T(C): 36.5 (12-30-22 @ 07:40), Max: 36.8 (12-29-22 @ 22:55)  HR: 78 (12-30-22 @ 07:40) (78 - 85)  BP: 106/82 (12-30-22 @ 07:40) (106/82 - 122/84)  RR: 17 (12-30-22 @ 07:40) (17 - 18)  SpO2: 97% (12-30-22 @ 07:40) (97% - 99%)  Wt(kg): --  I&O's Summary        Appearance: NAD  HEENT:  Dry oral mucosa, PERRL, EOMI	  Lymphatic: No lymphadenopathy , no edema  Cardiovascular: Normal S1 S2, No JVD, No murmurs , Peripheral pulses palpable 2+ bilaterally  Respiratory: Decreased bs 	  Gastrointestinal:  Soft, Non-tender, + BS	  Skin: No rashes, No ecchymoses, No cyanosis, warm to touch  Musculoskeletal: Normal range of motion, normal strength  Psychiatry:  Mood & affect appropriate  Ext: No edema      LABS:    CARDIAC MARKERS:                                11.4   7.91  )-----------( 296      ( 30 Dec 2022 05:00 )             35.4     12-30    140  |  103  |  13  ----------------------------<  85  3.9   |  28  |  0.84    Ca    8.4      30 Dec 2022 05:00  Phos  4.1     12-30  Mg     2.00     12-30    TPro  6.9  /  Alb  3.0<L>  /  TBili  0.6  /  DBili  x   /  AST  19  /  ALT  24  /  AlkPhos  117  12-29            TELEMETRY: 	    ECG:  	  RADIOLOGY: < from: MR Head w/wo IV Cont (12.29.22 @ 13:39) >    ACC: [id number] EXAM:  MR BRAIN WAW IC                          PROCEDURE DATE:  12/29/2022          INTERPRETATION:  CLINICAL INFORMATION: Follow-up study for infarct seen   on prior MR 12/9/2022. Patient initially presented to the hospital after   being found unresponsive.    TECHNIQUE: MRI of the brain was performed with and without contrast.   Sagittal and axial T1, axial T2, FLAIR, SWI, diffusion-weighted images   and an ADC map were obtained.    9.5 cc of Gadavist was injected, with 0.5 cc discarded.    COMPARISON: MR brain 12/9/2022    FINDINGS:    Previously seen foci of diffusion restriction in the right frontal   subcortical white matter and right centrum semiovale no longer restricted   diffusion and represent chronic infarcts. No new areas of infarction are   identified.    Foci of susceptibility artifact in the right occipital and right parietal   lobe, unchanged. No acute intracranial hemorrhage. No mass effect or   midline shift.    Areas of contrast enhancement in the bilateral putamen (12-18).   Enhancement of the alexis is also noted.    Moderate prominence of the sulci and ventricles..    There are foci of T2/FLAIR signal hyperintensity within the hemispheric   white matter, which are nonspecific but likely related tosequelae of   microvascular disease.    No abnormal extra-axial fluid collections are present.    Major flow-voids at the base of the brain follow expected course and   contour.    The calvarium is intact. Right ethmoid sinus mucosal thickening.    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 No new areas of infarct are identified. Unchanged scattered   foci of hemosiderin.    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   Differential diagnosis includes infection, sarcoidosis, lymphoma and   CLIPPERS (chronic lymphocytic inflammation with pontine perivascular   enhancement unresponsive to steroids).    --- End of Report ---          CARMEN KONG MD; Resident Radiologist  This document has been electronically signed.  TANVIR LUNA MD; Attending Radiologist  This document has been electronically signed. Dec 29 2022  5:08PM    < end of copied text >    DIAGNOSTIC TESTING:  [ ] Echocardiogram:  [ ]  Catheterization:  [ ] Stress Test:    OTHER:

## 2022-12-30 NOTE — CONSULT NOTE ADULT - SUBJECTIVE AND OBJECTIVE BOX
Reason for consult:    HPI:  52-year-old male with a PMHx significant for TIAs (2011, 2013), CVAs x3 (02/2022 s/p tPA, 8/3/2022 s/p tPA, 11/5/2022 with residual expressive aphasia) on Eliquis, and HLD BIBEMS after being found unresponsive at home. Per patient's 2 friends in the ED, patient was last spoken to around noon on 12/1. No one had heard from him or seen him since yesterday, was not responding to calls this am, so they went to check on him today and they found him unresponsive at home with head on floor turned to the side and one leg was on the bed. Patient was snoring per his friends. Friends called EMS. EMS states he was grunting but minimally responsive. Patient arrived to the ER covered in dried blood in his mouth with multiple ecchymosis on the left side of his face, neck,, chest, and arm. Patient obtunded. Friend denies hx of ETOH or drug use. Of note, patient had a questionable ASD/PFO with possible closure on 11/22/22; per patient's friends no PFO was found and no closure was performed.  Per outpatient records, patient previously on testosterone transdermal solution which was stopped on 11/15 by his urologist. Per patient's friends at bedside, patient was recently prescribed a mail-order testosterone replacement, unsure of the name and unsure if he received and started the medication. At baseline, patient is AOx4, able to ambulate without assistance, and takes care of all ADLs without assistance.     On arrival, patient with rectal temp 105.4 F, tachy to 130s, hypertensive to 180/110, and tachypneic to 30. C-collar was placed. Patient was intubated in the ED for airway protection with etomidate and succinylcholine. Patient sedated with propofol and fentanyl. Started on vanco and zosyn. 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 CT cervical spine and maxillofacial scans negative. CT chest, abdomen, and pelvis w/ contrast concerning for possible partial SBO without transition point. Surgery consulted in ED, no acute surgical intervention at this time. CT thoracic and lumbar spine showing degenerative disc disease T6-T7 through L4-L5 and mild disc bulges at L2-L3 through L4-L5 that flatten the ventral thecal sac and narrowing of the bilateral neural foramina.   (02 Dec 2022 22:45)    EEG was performed and show no seizure and only moderate to severe nonspecific diffuse or multifocal cerebral dysfunction. TTE was performed and no PFO was noticed and EF 57%. His CPK 8720 and patient was treated as rhabdomyolysis On 12/5/22, patient had a RR w/tongue laceration w/excess blood and posturing like movement. 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  Repeat EEG on 12/7/22 showed electrographic seizures captured but R LPDs, rare L frontal discharges, severe GRDA. Patient remained with fever and LP was considered. On 12/8/22 patient was extubated and started to following commands. ON 12/12 patient had arthrocentesis of left knee by ortho on 12/12/22 which technically showed blood.      LP was performed on 12/5 showed glucose and protein normal, no infection and negative for paraneoplastic encephalitis. NEGATIVE flow cytometry.       MRI brain was performed again on 12/29 showed b/l lentiform nucleus and pontine enhancement likely leptomeningeal suspicious sarcoidosis, infection, lymphoma and chronic lymphocytic inflammation w/pontine perivascular enhancement       Of note- CT chest/abd/pelvis on 11/5/22- revealed bilateral small scattered pulmonary nodules. No suspicious findings in the abdomen or pelvis.    He appears to be more awake, alert.  Able to converse appropriately, oriented.  Has left foot drop, difficulty ambulating.       PAST MEDICAL & SURGICAL HISTORY:  History of TIAs      CVA (cerebrovascular accident)      HLD (hyperlipidemia)      Vertigo      Unresponsiveness      No significant past surgical history          FAMILY HISTORY:  No pertinent family history in first degree relatives        Alochol: Denied  Smoking: Nonsmoker  Drug Use: Denied  Marital Status:         Allergies    No Known Allergies    Intolerances        MEDICATIONS  (STANDING):  apixaban 5 milliGRAM(s) Oral two times a day  aspirin enteric coated 81 milliGRAM(s) Oral daily  atorvastatin 40 milliGRAM(s) Oral at bedtime  bisacodyl Suppository 10 milliGRAM(s) Rectal daily  chlorhexidine 2% Cloths 1 Application(s) Topical daily  dextrose 50% Injectable 25 Gram(s) IV Push once  dextrose 50% Injectable 12.5 Gram(s) IV Push once  dextrose 50% Injectable 25 Gram(s) IV Push once  dextrose Oral Gel 15 Gram(s) Oral once  diphtheria/tetanus/pertussis (acellular) Vaccine (Adacel) 0.5 milliLiter(s) IntraMuscular once  glucagon  Injectable 1 milliGRAM(s) IntraMuscular once  lacosamide IVPB 100 milliGRAM(s) IV Intermittent every 12 hours  levETIRAcetam  IVPB 1500 milliGRAM(s) IV Intermittent every 12 hours  lidocaine   4% Patch 1 Patch Transdermal every 24 hours  multivitamin 1 Tablet(s) Oral daily    MEDICATIONS  (PRN):  oxyCODONE    IR 5 milliGRAM(s) Oral every 6 hours PRN moderate and severe pain  phenazopyridine 100 milliGRAM(s) Oral every 8 hours PRN dysuria  traMADol 25 milliGRAM(s) Oral every 6 hours PRN Moderate Pain (4 - 6)      ROS  as per HPI    T(C): 36.6 (12-30-22 @ 17:25), Max: 36.8 (12-29-22 @ 22:55)  HR: 90 (12-30-22 @ 17:25) (78 - 90)  BP: 111/61 (12-30-22 @ 17:25) (106/82 - 122/84)  RR: 17 (12-30-22 @ 17:25) (16 - 18)  SpO2: 99% (12-30-22 @ 17:25) (97% - 100%)  Wt(kg): --    PE  NAD  Awake, alert  Anicteric, MMM  RRR  CTAB  Abd soft, NT, ND  No edema  No rash   Left foot drop  No palpable lymphadenopathy or splenomegaly                        11.4   7.91  )-----------( 296      ( 30 Dec 2022 05:00 )             35.4       12-30    140  |  103  |  13  ----------------------------<  85  3.9   |  28  |  0.84    Ca    8.4      30 Dec 2022 05:00  Phos  4.1     12-30  Mg     2.00     12-30    TPro  6.9  /  Alb  3.0<L>  /  TBili  0.6  /  DBili  x   /  AST  19  /  ALT  24  /  AlkPhos  117  12-29

## 2022-12-30 NOTE — PROGRESS NOTE ADULT - ASSESSMENT
52-year-old male with a PMHx significant for TIAs (2011, 2013), CVAs x3 (02/2022 s/p tPA, 8/3/2022 s/p tPA, 11/5/2022 with residual expressive aphasia) on Eliquis, and HLD BIBEMS after being found unresponsive at home on the floor, last known well 12/1 at around noon. C-collar was placed. Patient was intubated in the ED for airway protection. CTH with no acute findings, vEEG with no identified seizures. Patient was weaned off pressors, extubated, and transitioned to floors 12/4/22. 12/5 am RRT called for seizure like activity with urinary incontinence, tongue biting, and R>L posturing, patient s/p ativan 2mg x3. Anesthesia called to RRT for intubation. MICU accepting patient for seizure like activity requiring intubation.      #AMS, Seizure like activity  EEG 12/7: concern for epileptiform activity; though EEG from 12/5 without such abnormalities.   - f/u neuro recs  - Patient found down and unresponsive at home on 12/2, last known well 12/1 at around noon. Intubated in ED 12/2, extubated in MICU 12/4  - RRT 12/5: seizure like acting with urinary incontinence, convulsions, and tongue biting; s/p ativan 2mg x3 with pauses in seizure activity following each; intubated for airway protection with rocuronium. Prolactin ~75   - CTH and CT cervical spine 12/2 negative for any acute pathologies. Tox screen on admission negative. 12/5: Stable exam from prior CT head, chronic microvascular ischemic changes, parenchymal loss, dystrophic calcification of central portion of alexis unchanged.  - Video EEG 12/3-12/4 without any seizure like activity, moderate to severe nonspecific diffuse or multifocal cerebral dysfunction  - c/w keppra 1500mg q12 maintenance, Keppra loaded 4.5g  - patient extubated again on 12/8th  -MRI noted, chronic CVA, no acute CVA noted     MRI noted , new findings for leptomeningeal disease, R/O sarcoid vs lymphoma   Hematology and rheum eval called  repeat scan per heme            #CVAs/TIAs  - Hx of TIAs in 2011 and 2013, CVAs x3 in 02/22, 8/22, and 11/22 with residual expressive aphasia   - on Eliquis and Aspirin at home for hx of stroke  - PFO work up in past negative, no evidence of PFO on MIMI X2  - c/w ASA   - c/w heparin gtt, was on hold, resume eliquis   - Restart Statin when LFTs and CPK improve per neurology  - Being worked up for Mixed Connective Tissue Disease OP- f/u p-ANCA, c-ANCA/ [id number] negative  - Neuro recs appreciated  - repeat brain MRI   - LE pain, check MRI     # Hypernatremia  tredn Na level   renal eval appreciated  S/P hydraiton, trend Na level     #Thecal sac flattening   - CT thoracic and lumbar spine showing degenerative disc disease T6-T7 through L4-L5 and mild disc bulges at L2-L3 through L4-L5 that flatten the ventral thecal sac and narrowing of the bilateral neural foramina  - Will Monitor for now, will consider neuro/neurosurg evaluation in the future    #HLD  - atorvastatin on hold due to elevated CK and LFTs  - restart when possible for secondary stroke prevention    #?ASD/PFO  - Patient reportedly found to have a PFO in February 2022 during a stroke workup at [place]. Patient presented for a PFO closure in November 2022. Notably, no PFO was found at the time and no intervention was performed by Dr. Lynn.    - TTE 11/5/22 EF 57% and grossly normal LV function  - MIMI performed bedside 12/5 1 of 2 studies (+) on bubble study (uploaded to [facility])  - elevated D d-andrew, Check LE DVT study       #Rhabdomyolysis  - CK 8720 on admission, peaked at 15k, now downtrending  - DDx: prolonged downtime myositis v hyperthermia? (T 105.4 F) v sepsis v seizure induced    - L Ankle adn Knee pain, swelling  S/P Tap last week  cont to have pain and tenderness and swelling  Repeat imaging   Ortho follow up         # Elevated [place] level  CHeck Abd US noted    GI eval PRN   Trend LFTs , normalized, no need for MRCP       #Partial SBO - resolved  - CT chest, abdomen, and pelvis w/ contrast concerning for possible partial SBO without transition point.  - Surgery consulted, no acute intervention at this time  - 12/5 KUB - negative for ileus or SBO  - Hold TF for 12/7 for possible trial of extubation.  - otherwise diet per nutrition      #Leukocytosis  - worsening leukocytosis  - monitor for fever  - Ortho eval fro knee swelling   - Pan Cx  - LP by IR , follow up results         #Concern for sepsis  Unclear source, aspiration v less likely meningitis   BCx (12/2 NGTD repeat 12/5 NGTD) and UC 12/2 NGTD  - Patient initially presented with AMS, T 105.4, tachycardic, and tachypneic   - UA negative  - s/p vanco and zosyn x1 in ED 12/2, ceftriaxone 2g BID 12/3-12/4, vanco 12/3-12/4  - 12/7 DCed Vanc since BCx from 12/5 NGTD  - c/w zosyn for aspiration pna presumed. completed course, monitor   - ID eval appreciated  - febrile again, Ortho for knee asp      # ANemia  noted, no gross bleeding  type and screen  GI eval     Discussed with patient's family over the phone

## 2022-12-30 NOTE — PROGRESS NOTE ADULT - ASSESSMENT
52-year-old  M known to me from outpatient setting with  TIAs (2011, 2013), Strokes x3 (02/2022 s/p tPA, 8/3/2022 s/p tPA, 11/5/2022 with residual expressive aphasia) on Eliquis, was on testosterone outpatient stopped after last stroke, HLD BIBEMS after being found unresponsive  Temp 105.4 on arrival tachy and /110. 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   CT cervical spine and maxillofacial scans negative.  CT chest, abdomen, and pelvis w/ contrast concerning for possible partial SBO without transition point. Surgery consulted in ED, no acute surgical intervention at this time.   CT thoracic and lumbar spine showing degenerative disc disease T6-T7 through L4-L5 and mild disc bulges at L2-L3 through L4-L5 that flatten the ventral thecal sac and narrowing of the bilateral neural foramina.  MRI 11/2022: R centrum semiovale and R posterior frontal infarcts   takes adderall at home   + rhabdo  EEG no seizures   + transaminitis   CTH 12/5 stable   outpatient hypercoag workup neg   12/5 extubated then reintibuated for seizure activity and tx back to ICU   EEG this AM 12/5 with only slowing   EEG 12/6 slowing but no seizures   spoke with Pippa Conti (friend) who states after he was taken off the testosterone he ordered a mail order testosterone supplement, unclear if he started it yet, unclear what this was  12/7 EEG no electrogtraphic seizures captured but R LPDs, rare L frontal discharges, severe GRDA.   12/8 EEG improved.  extubated. following some commands   12/9 moving uppers> lowers   12/11 AAOx2 uppers 4-5/5; not moving LLE well   MRI brain neg for new stroke   s/p L knee tap arthrocentesis  by ortho on 12/12   spoke with friend pippa conti - she counted his adderrall and didn't take extra tables. did find ashwagandha and red wood (bottle was sealed) supplement in his apartment   s/p LP 12/5 --> CSF glucose and protein WNL.  will f/u remaining studies. non infectious appearing --> paraneoplastic was neg   o/e AAOx2, slurred but 2/2 tongue bite   + transaminitis   more alert  12/20 neuro exam improving AAOx2-3   c/o L ankle pain in addition to knee  + COVID   MRI brain 12/29: chronic R frontal and R centrum semiovale infarcts unchanged.  more conspicuous and newer bilateral lentiform nucleus and pontine enhancement of unclear etiology. ddx CLIPPERS, sarcoid, lymphoma vs infection.  (reviewed [facility] neuro radiology Dr. fried)     Impression:   1) AMS of unclear etiology, possible now seizure, related to adderral withdrawal? 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 had MIMI was question of PFO but not found. s/p ILR   3) bilateral lentiform nucleus and pontine enhacement     - unclear of what to make of new MRI findings from 12/29.   lower suspicion for CLIPPERS.  would workup for lymphoma and sarcoid at this point.  if no etiology found would treat with high dose steroids 500mg BID x 3-5 days.  may need to consider repeat LP as well.  f/u with heme/onc as well as rheumatology   - covid precuations    -  L ankle imaging --. podiatry.   - resume AC s/p LP when able given L knee --> eliquis 5mg BID restarted ; will consider stopping this in future  - possible MRCP planning for Monday. GI f/u.  elevated LFTs --> LFTs improved--> downtrending . MRCP deferred   - ortho for L knee s/p tap / arthrocentesis   -  Vimpat 100mg BID    - keppra 1500mg BID.   - fever on arrival, treatred initially for meningitis.  was on abx for aspiration PNA.  >LP done--> non  infectious   - IVF  - CPK elevated. trend improving   -   endocrine workup/eval   - there was some concern outpatient he may have a mixed  connective tissue disorder   - statin therapy for secondary stroke prevention when LFTS and CPK improved   - telemetry  - PT/OT/SS/SLP, OOBC  - check FS, glucose control <180  - GI/DVT ppx  - Thank you for allowing me to participate in the care of this patient. Call with questions.   - spoke with friend at bedside, Galilea 12/11   - spoke with Pippa Conti at [phone] for more collateral info and to provide update. spoke again 12/12 over phone. spoke 12/19   spoke with parents at bedside 12/20   d/c planning MUSA bender now covid auth good until 12/31   Braxton Forde MD  Vascular Neurology  Office: [phone]    52-year-old  M known to me from outpatient setting with  TIAs (2011, 2013), Strokes x3 (02/2022 s/p tPA, 8/3/2022 s/p tPA, 11/5/2022 with residual expressive aphasia) on Eliquis, was on testosterone outpatient stopped after last stroke, HLD BIBEMS after being found unresponsive  Temp 105.4 on arrival tachy and /110. 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   CT cervical spine and maxillofacial scans negative.  CT chest, abdomen, and pelvis w/ contrast concerning for possible partial SBO without transition point. Surgery consulted in ED, no acute surgical intervention at this time.   CT thoracic and lumbar spine showing degenerative disc disease T6-T7 through L4-L5 and mild disc bulges at L2-L3 through L4-L5 that flatten the ventral thecal sac and narrowing of the bilateral neural foramina.  MRI 11/2022: R centrum semiovale and R posterior frontal infarcts   takes adderall at home   + rhabdo  EEG no seizures   + transaminitis   CTH 12/5 stable   outpatient hypercoag workup neg   12/5 extubated then reintibuated for seizure activity and tx back to ICU   EEG this AM 12/5 with only slowing   EEG 12/6 slowing but no seizures   spoke with Pippa Conti (friend) who states after he was taken off the testosterone he ordered a mail order testosterone supplement, unclear if he started it yet, unclear what this was  12/7 EEG no electrogtraphic seizures captured but R LPDs, rare L frontal discharges, severe GRDA.   12/8 EEG improved.  extubated. following some commands   12/9 moving uppers> lowers   12/11 AAOx2 uppers 4-5/5; not moving LLE well   MRI brain neg for new stroke   s/p L knee tap arthrocentesis  by ortho on 12/12   spoke with friend pippa conti - she counted his adderrall and didn't take extra tables. did find ashwagandha and red wood (bottle was sealed) supplement in his apartment   s/p LP 12/5 --> CSF glucose and protein WNL.  will f/u remaining studies. non infectious appearing --> paraneoplastic was neg   o/e AAOx2, slurred but 2/2 tongue bite   + transaminitis   more alert  12/20 neuro exam improving AAOx2-3   c/o L ankle pain in addition to knee  + COVID   MRI brain 12/29: chronic R frontal and R centrum semiovale infarcts unchanged.  more conspicuous and newer bilateral lentiform nucleus and pontine enhancement of unclear etiology. ddx CLIPPERS, sarcoid, lymphoma vs infection.  (reviewed [facility] neuro radiology Dr. fried)     Impression:   1) AMS of unclear etiology, possible now seizure, related to adderral withdrawal? 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 had MIMI was question of PFO but not found. s/p ILR   3) bilateral lentiform nucleus and pontine enhacement     - unclear of what to make of new MRI findings from 12/29, doubt related to covid as was present minimally on prior MRI.   lower suspicion for CLIPPERS.  would workup for lymphoma and sarcoid at this point.  if no etiology found would treat with high dose steroids 500mg BID x 3-5 days.  may need to consider repeat LP as well.  f/u with heme/onc as well as rheumatology   - covid precuations    -  L ankle imaging --. podiatry.   - resume AC s/p LP when able given L knee --> eliquis 5mg BID restarted ; will consider stopping this in future  - possible MRCP planning for Monday. GI f/u.  elevated LFTs --> LFTs improved--> downtrending . MRCP deferred   - ortho for L knee s/p tap / arthrocentesis   -  Vimpat 100mg BID    - keppra 1500mg BID.   - fever on arrival, treatred initially for meningitis.  was on abx for aspiration PNA.  >LP done--> non  infectious   - IVF  - CPK elevated. trend improving   -   endocrine workup/eval   - there was some concern outpatient he may have a mixed  connective tissue disorder   - statin therapy for secondary stroke prevention when LFTS and CPK improved   - telemetry  - PT/OT/SS/SLP, OOBC  - check FS, glucose control <180  - GI/DVT ppx  - Thank you for allowing me to participate in the care of this patient. Call with questions.   - spoke with friend at bedside, Galilea 12/11   - spoke with Pippa Conti at [phone] for more collateral info and to provide update. spoke again 12/12 over phone. spoke 12/19   spoke with parents at bedside 12/20   d/c planning MUSA bender now covid auth good until 12/31   Braxton Forde MD  Vascular Neurology  Office: [phone]

## 2022-12-30 NOTE — CONSULT NOTE ADULT - ASSESSMENT
52-year-old  male presented with PMHx of TIAs (2011, 2013), CVAs x3 (2/2022, 8/3/2022, 11/5/2022) w/residual expressive aphasia on Eliquis, dyslipidemia and questionable ASD/PFO admitted after patient was found unresponsive at home on 12/2/2022. Currently mental status and fever improving. MRI showed bilateral lentiform nucleus and pontine enhancement need to R/O sarcoidosis. Rheumatology consult for further evaluation      #Leptomeningeal enhancement   -Unexplained AMS w/seizure  -CSF ACE negative, and paraneoplastic negative  -Serum ACE negative and normal vitamin D25  -MRI showed bilateral lentiform nucleus and pontine enhancement   -CT chest no sign of mediastinal lymph node  -differentials: sarcoidosis, IgG4-RD, lymphoma, and CLOPPERS    #Left foot drop  -new left foot drop and did not recall trauma   -Possible mononeuritis multiplex?     Plan:  -Check vitamin D 1,25, Vitamin D, IgG4 RD, RF, CCP, MPO, PR3  -Check atypical APS lab phosphorylethanolamine, phosphatidylserine  -Patient will need leptomeningeal biopsy to definitely dx sarcoidosis        Note is incomplete, please wait for attending attestation 52-year-old  male presented with PMHx of TIAs (2011, 2013), CVAs x3 (2/2022, 8/3/2022, 11/5/2022) w/residual expressive aphasia on Eliquis, dyslipidemia and questionable ASD/PFO admitted after patient was found unresponsive at home on 12/2/2022. Currently mental status and fever improving. MRI showed bilateral lentiform nucleus and pontine enhancement need to R/O sarcoidosis. Rheumatology consult for further evaluation      #Leptomeningeal enhancement   -Unexplained AMS w/seizure  -CSF ACE negative, and paraneoplastic negative  -Serum ACE negative and normal vitamin D25  -MRI showed bilateral lentiform nucleus and pontine enhancement   -CT chest no sign of mediastinal lymph node  -differentials: sarcoidosis, IgG4-RD, lymphoma, and CLOPPERS    #Left foot drop  -Left ankle XR showed tarsometatarsal alignment maintained without evidence for a Lisfranc injury. Congenitally fused 5th DIP joint. Preserved remaining visualized joint spaces and no joint margin erosions.  -new left foot drop and did not recall trauma but endorsed tenderness   -Possible muscle rupture? anterior tibial w/achilles tendon, mononeuritis multiplex?       Plan:  -Check vitamin D 1,25, Vitamin D, IgG4 RD, RF, CCP, MPO, PR3  -Check atypical APS lab phosphorylethanolamine, phosphatidylserine  -Patient will need leptomeningeal biopsy to definitely dx sarcoidosis      -follow for the MRI of the left foot result     DW Dr. Wood Conn MD  PGY-4 Rheumatology Fellow  Pager: [phone]   Available in teams  52-year-old  male presented with PMHx of TIAs (2011, 2013), CVAs x3 (2/2022, 8/3/2022, 11/5/2022) w/residual expressive aphasia on Eliquis, dyslipidemia and questionable ASD/PFO admitted after patient was found unresponsive at home on 12/2/2022. Currently mental status and fever improving. MRI showed bilateral lentiform nucleus and pontine enhancement need to R/O sarcoidosis. Rheumatology consult for further evaluation      #Leptomeningeal enhancement   -Unexplained AMS w/seizure  -CSF ACE negative, and paraneoplastic negative  -Serum ACE negative and normal vitamin D25  -MRI showed bilateral lentiform nucleus and pontine enhancement   -CT chest no sign of mediastinal lymph node  -differentials: sarcoidosis, IgG4-RD, lymphoma, lupus cerebritis  and CLIPPERS    #Left foot drop  -Left ankle XR showed tarsometatarsal alignment maintained without evidence for a Lisfranc injury. Congenitally fused 5th DIP joint. Preserved remaining visualized joint spaces and no joint margin erosions.  -new left foot drop and did not recall trauma but endorsed tenderness   -Possible muscle rupture? anterior tibialis tendon rupture? mononeuritis multiplex?       Plan:  -Check vitamin D 1,25, Vitamin D, IgG4 RD, RF, CCP, MPO, PR3  -Check atypical APS lab phosphorylethanolamine, phosphatidylserine  - check ribosomal P (all other lupus labs negative on )  -Patient will need leptomeningeal biopsy to definitely dx sarcoidosis and to rule out lymphoma if no other cause found  -follow for the MRI of the left foot result  and determine if needs further neurologic evaluation for foot drop.     SARKIS Conn MD  PGY-4 Rheumatology Fellow  Pager: [phone]   Available in teams

## 2022-12-30 NOTE — CONSULT NOTE ADULT - ASSESSMENT
52-year-old male with a history of TIAs (2011, 2013), CVAs x3 (2/2022, 8/3/2022, 11/5/2022) w/residual expressive aphasia on Eliquis, dyslipidemia and questionable ASD/PFO admitted after patient was found unresponsive at home on 12/2/2022, now improving mental status but MRI with bilateral lentiform nucleus and pontine enhancement.     Abnormal MRI findings-   Low suspicion for lymphoma as he has had a negative LP/flow cytometry on 12/15 and his mental status is improving without any intervention with chemo/immunotherapy.   With MRI findings/symptoms of foot drop, recommend repeat lumbar puncture.   He may require a biopsy.   His CT of his chest/abd/pelvis was negative on 11/5/2022 but would repeat imaging to ensure no systemic disease.   Serum LDH is only 312.    Anemia-  Not anemia on admission, likely related to blood draws.   No evidence of B12/folate, iron deficiency.     On eliquis for prior CVA history.    Will follow.

## 2022-12-30 NOTE — PROGRESS NOTE ADULT - SUBJECTIVE AND OBJECTIVE BOX
Neurology Progress Note    S: Patient seen and examined ,    +_ covid precuations     Medication:  MEDICATIONS  (STANDING):  apixaban 5 milliGRAM(s) Oral two times a day  aspirin enteric coated 81 milliGRAM(s) Oral daily  atorvastatin 40 milliGRAM(s) Oral at bedtime  bisacodyl Suppository 10 milliGRAM(s) Rectal daily  chlorhexidine 2% Cloths 1 Application(s) Topical daily  dextrose 50% Injectable 25 Gram(s) IV Push once  dextrose 50% Injectable 12.5 Gram(s) IV Push once  dextrose 50% Injectable 25 Gram(s) IV Push once  dextrose Oral Gel 15 Gram(s) Oral once  diphtheria/tetanus/pertussis (acellular) Vaccine (Adacel) 0.5 milliLiter(s) IntraMuscular once  glucagon  Injectable 1 milliGRAM(s) IntraMuscular once  lacosamide IVPB 100 milliGRAM(s) IV Intermittent every 12 hours  levETIRAcetam  IVPB 1500 milliGRAM(s) IV Intermittent every 12 hours  lidocaine   4% Patch 1 Patch Transdermal every 24 hours  multivitamin 1 Tablet(s) Oral daily    MEDICATIONS  (PRN):  oxyCODONE    IR 5 milliGRAM(s) Oral every 6 hours PRN moderate and severe pain  phenazopyridine 100 milliGRAM(s) Oral every 8 hours PRN dysuria  traMADol 25 milliGRAM(s) Oral every 6 hours PRN Moderate Pain (4 - 6)          Vitals:    Vital Signs Last 24 Hrs  T(C): 36.5 (12-30-22 @ 07:40), Max: 36.8 (12-29-22 @ 22:55)  T(F): 97.7 (12-30-22 @ 07:40), Max: 98.2 (12-29-22 @ 22:55)  HR: 78 (12-30-22 @ 07:40) (78 - 85)  BP: 106/82 (12-30-22 @ 07:40) (106/82 - 122/84)  BP(mean): --  RR: 17 (12-30-22 @ 07:40) (17 - 18)  SpO2: 97% (12-30-22 @ 07:40) (97% - 99%)        General Exam:   General Appearance: Appropriately dressed and in no acute distress       Head: Normocephalic, atraumatic and no dysmorphic features  Ear, Nose, and Throat: Moist mucous membranes    CVS: S1S2+  Resp: No SOB, no wheeze or rhonchi  Abd: soft NTND  Extremities: No edema, no cyanosis  Skin: No bruises, no rashes     Neurological Exam:    Mental Status: Awake, Oriented x 2-3, able to follow simple and complex verbal commands. answers questions appropriately, able to name, repeat and recall. no aphasia, mild dysarthria (tongue bite)   Cranial Nerves: EOMI, PERRLA, VFF, V1-V3 sensation intact, no facial asymmetry, tongue midline, hearing intact B/L, shoulder shrug appropriate, SCM/trap intact.   Motor: Moving uppers > lowers. uppers at least 4+-5/5 ; lowers R>L 3-4/5   Sensation: intact to LT and PP   Reflexes: 1+ throughout at biceps, brachioradialis, triceps, patellars and ankles bilaterally and equal. No clonus. R toe and L toe were both downgoing.  Coordination: no dysmetria   Gait: unable     I personally reviewed the below data/images/labs:  CBC Full  -  ( 30 Dec 2022 05:00 )  WBC Count : 7.91 K/uL  RBC Count : 3.79 M/uL  Hemoglobin : 11.4 g/dL  Hematocrit : 35.4 %  Platelet Count - Automated : 296 K/uL  Mean Cell Volume : 93.4 fL  Mean Cell Hemoglobin : 30.1 pg  Mean Cell Hemoglobin Concentration : 32.2 gm/dL  Auto Neutrophil # : x  Auto Lymphocyte # : x  Auto Monocyte # : x  Auto Eosinophil # : x  Auto Basophil # : x  Auto Neutrophil % : x  Auto Lymphocyte % : x  Auto Monocyte % : x  Auto Eosinophil % : x  Auto Basophil % : x    12-30    140  |  103  |  13  ----------------------------<  85  3.9   |  28  |  0.84    Ca    8.4      30 Dec 2022 05:00  Phos  4.1     12-30  Mg     2.00     12-30    TPro  6.9  /  Alb  3.0<L>  /  TBili  0.6  /  DBili  x   /  AST  19  /  ALT  24  /  AlkPhos  117  12-29      < from: CT Head No Cont (12.02.22 @ 20:27) >    ACC: [id number] EXAM:  CT CERVICAL SPINE                        ACC: [id number] EXAM:  CT MAXILLOFACIAL                        ACC: [id number] EXAM:  CT BRAIN                          PROCEDURE DATE:  12/02/2022        INTERPRETATION:  CLINICAL INDICATION: Trauma.    Technique: Noncontrast axial CT of the head, facial bones, and cervical   spine was performed. 3-D reformats of the facial bones was obtained.   Coronal and sagittal reformats were obtained.      COMPARISON: None.    FINDINGS:  Head CT:  The ventricles and sulci are within normal limits. 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 There is no intraparenchymal hematoma, mass effect   or midline shift. No abnormal extra-axial fluid collections or   hemorrhages are present.    There is swelling of the left lateral scalp. The calvarium is intact.   There are scattered mucosal inflammatory changes in the paranasal sinuses.      Cervical spine CT:  Alignment ismaintained. Vertebral bodies are normal in height, without   evidence of fracture or dislocation. Prevertebral soft tissues are within   normal limits without soft tissue swelling or hematoma.    Intervertebral discs are intact. Neural foramina and spinal canal are   intact.    The visualized lung apices are within normal limits.      Facial bone CT:    No acute facial fracture.    There are scattered mucosal inflammatory changes in the paranasal   sinuses. Mastoid air cells are clear.    Soft tissues appear unremarkable.        IMPRESSION:  CT HEAD: No acute abnormality.  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  CT CERVICAL SPINE: No acute abnormality  CT FACIAL BONE: No acute abnormality    --- End of Report ---       DELGADO IVAN MD; Attending Radiologist  This document has been electronically signed. Dec  2 2022  9:02PM    < end of copied text >  < from: CT Lumbar Spine No Cont (12.02.22 @ 20:27) >    ACC: [id number] EXAM:  CT LUMBAR SPINE                        ACC: [id number] EXAM:  CT THORACIC SPINE                          PROCEDURE DATE:  12/02/2022          INTERPRETATION:  CT thoracic and lumbar spine without IV contrast    CLINICAL INFORMATION:  Trauma  Back pain, fracture.    TECHNIQUE:  Contiguous axial 2 mm sections were obtained through the   thoracic and lumbar spine using a single helical acquisition.     Additional 2 mm sagittal and coronal reconstructions of the spine were   obtained. These additional reformatted images were used to evaluate the   spine for alignment, vertebral fractures and the integrity of the the   posterior elements.   This scan was performed using automatic exposure   control (radiation dose reduction software) to obtain a diagnostic image   quality scan with patient dose as low as reasonably achievable.    FINDINGS:   No prior similar studies are available for review    Thoracic and lumbar vertebral body heights are maintained. No vertebral   fracture is seen. No destructive bone lesion is found.  Alignment is   preserved.  Facet joints appear intact and aligned.    Thoracic and lumbar intervertebral disc spaces appear intact.   Degenerative disc disease and spondylosis is noted at T6-T7 throughL4-5   with loss of disc height and associated degenerative endplate changes.   Mild disc bulges at L2-3 through L4-5 flatten the ventral thecal sac and   narrow the BILATERAL neural foramina.    No paraspinal mass is recognized.  Paraspinal soft tissues appear intact.      IMPRESSION:  Degenerative disc disease and spondylosis is noted at T6-T7   through L4-5 with loss of disc height and associated degenerative   endplate changes. Mild disc bulges at L2-3 through L4-5 flatten the   ventral thecal sac and narrow the BILATERAL neural foramina   No   vertebral fracture is recognized.    --- End of Report ---       ANGIE ESCOBAR MD; Attending Radiologist  This document has been electronically signed. Dec  2 2022  8:55PM    < end of copied text >    EEG Classification / Summary:  Abnormal EEG in a comatose patient due to diffuse suppression gradually improving to discontinuous background, with right hemispheric relative attenuation/suppression. No epileptiform abnormalities or seizures are captured.    Clinical Impression:  Severe diffuse cerebral dysfunction that gradually improves is nonspecific in etiology. In this case, it may be related to sedating medication (propofol) with improvement after decreasing and stopping the medication.  Right hemispheric focal cerebral dysfunction can be structural or functional in etiology.      12/7  Clinical Impression:  -Occasional runs of right frontal lateralized periodic discharges (LPDs) at up to 1.3 Hz indicate a potentially epileptogenic focus in the right frontal region and are on the ictal-interictal continuum.  -A pattern on the ictal-interictal continuum is a pattern that does not qualify as an electrographic seizure or electrographic status epilepticus, but there is a reasonable chance that it may be contributing to impaired alertness, causing other clinical symptoms, and/or contributing to neuronal injury.  -Right hemispheric relative attenuation indicates right hemispheric focal cerebral dysfunction, which can be structural or functional in etiology.  -Rare left frontal epileptiform discharges indicate a potentially epileptogenic focus in this reigon  -Severe diffuse slowing and GRDA indicate severe diffuse cerebral dysfunction of nonspecific etiology.  -No electrographic seizures are captured.     < from: MR Head w/wo IV Cont (12.09.22 @ 19:54) >    ACC: [id number] EXAM:  MR BRAIN WAW IC                          PROCEDURE DATE:  12/09/2022          INTERPRETATION:  CLINICAL INDICATION: CVA, found unresponsive at home      Magnetic resonance imaging of the brain was carried out with transaxial   SPGR, FLAIR, fast spin echo T2 weighted images, axial susceptibility   weighted series, diffusion weighted series and sagittal T1 weighted   series on a 1.5 Maritza magnet. Post contrast axial, coronal and sagittal   T1 weighted images were obtained. 10cc of Gadavist were intravenously   injected, 0 cc were discarded.      Comparison is made with the prior brain CT of 12/5/2022 and MRI 11/5/2022.    Mild ventricular and sulcal prominence is consistent with moderate   atrophy for the patient's age. No acute hemorrhage is identified. There   is a focus of hemosiderin within the alexis which is calcified on CT.   Scattered additional foci of hemosiderin deposition are identified in the   left frontal subcortical white matter and right occipital cortex is   nonspecific but may be related to multiple cavernoma is or hypertension.    There is a tiny focus of diffusion restriction in the right frontal   subcortical white matter and right centrum semiovale which are unchanged   since the prior exam and may represent subacute infarcts. Multiplicity   infarcts may be related to hypercoagulable state or cardioembolic events.    After contrast administration there is normal intracranial vascular   enhancement. No abnormal parenchymal or leptomeningeal enhancement.      IMPRESSION: Atrophy for the patient's age. Right frontal subcortical and   right centrum semiovale punctate infarcts are unchanged since 11/5/2022   and likely represent subacute infarcts. No abnormal enhancement. Focus of   calcification in the alexis with old hemosiderin. A few additional   scattered foci of hemosiderin deposition are unchanged.    --- End of Report ---     < from: MR Head w/wo IV Cont (12.29.22 @ 13:39) >    ACC: [id number] EXAM:  MR BRAIN WAW IC                          PROCEDURE DATE:  12/29/2022          INTERPRETATION:  CLINICAL INFORMATION: Follow-up study for infarct seen   on prior MR 12/9/2022. Patient initially presented to the hospital after   being found unresponsive.    TECHNIQUE: MRI of the brain was performed with and without contrast.   Sagittal and axial T1, axial T2, FLAIR, SWI, diffusion-weighted images   and an ADC map were obtained.    9.5 cc of Gadavist was injected, with 0.5 cc discarded.    COMPARISON: MR brain 12/9/2022    FINDINGS:    Previously seen foci of diffusion restriction in the right frontal   subcortical white matter and right centrum semiovale no longer restricted   diffusion and represent chronic infarcts. No new areas of infarction are   identified.    Foci of susceptibility artifact in the right occipital and right parietal   lobe, unchanged. No acute intracranial hemorrhage. No mass effect or   midline shift.    Areas of contrast enhancement in the bilateral putamen (12-18).   Enhancement of the alexis is also noted.    Moderate prominence of the sulci and ventricles..    There are foci of T2/FLAIR signal hyperintensity within the hemispheric   white matter, which are nonspecific but likely related tosequelae of   microvascular disease.    No abnormal extra-axial fluid collections are present.    Major flow-voids at the base of the brain follow expected course and   contour.    The calvarium is intact. Right ethmoid sinus mucosal thickening.    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 No new areas of infarct are identified. Unchanged scattered   foci of hemosiderin.    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   Differential diagnosis includes infection, sarcoidosis, lymphoma and   CLIPPERS (chronic lymphocytic inflammation with pontine perivascular   enhancement responsive to steroids).    --- End of Report ---         CARMEN KONG MD; Resident Radiologist  This document has been electronically signed.  TANVIR LUNA MD; Attending Radiologist  This document has been electronically signed. Dec 29 2022  5:08PM    < end of copied text >

## 2022-12-30 NOTE — PROGRESS NOTE ADULT - SUBJECTIVE AND OBJECTIVE BOX
Name of Patient : TAMI DUNHAM  MRN: [id number]  Date of visit: 22 @ 15:40      Subjective: Patient seen and examined. No new events except as noted.   calm     REVIEW OF SYSTEMS:    CONSTITUTIONAL: No weakness, fevers or chills  EYES/ENT: No visual changes;  No vertigo or throat pain   NECK: No pain or stiffness  RESPIRATORY: No cough, wheezing, hemoptysis; No shortness of breath  CARDIOVASCULAR: No chest pain or palpitations  GASTROINTESTINAL: No abdominal or epigastric pain. No nausea, vomiting, or hematemesis; No diarrhea or constipation. No melena or hematochezia.  GENITOURINARY: No dysuria, frequency or hematuria  NEUROLOGICAL: No numbness or weakness  SKIN: No itching, burning, rashes, or lesions   All other review of systems is negative unless indicated above.    MEDICATIONS:  MEDICATIONS  (STANDING):  apixaban 5 milliGRAM(s) Oral two times a day  aspirin enteric coated 81 milliGRAM(s) Oral daily  atorvastatin 40 milliGRAM(s) Oral at bedtime  bisacodyl Suppository 10 milliGRAM(s) Rectal daily  chlorhexidine 2% Cloths 1 Application(s) Topical daily  dextrose 50% Injectable 25 Gram(s) IV Push once  dextrose 50% Injectable 12.5 Gram(s) IV Push once  dextrose 50% Injectable 25 Gram(s) IV Push once  dextrose Oral Gel 15 Gram(s) Oral once  diphtheria/tetanus/pertussis (acellular) Vaccine (Adacel) 0.5 milliLiter(s) IntraMuscular once  glucagon  Injectable 1 milliGRAM(s) IntraMuscular once  lacosamide IVPB 100 milliGRAM(s) IV Intermittent every 12 hours  levETIRAcetam  IVPB 1500 milliGRAM(s) IV Intermittent every 12 hours  lidocaine   4% Patch 1 Patch Transdermal every 24 hours  multivitamin 1 Tablet(s) Oral daily      PHYSICAL EXAM:  T(C): 36.6 (22 @ 13:35), Max: 36.8 (22 @ 22:55)  HR: 85 (22 @ 13:35) (78 - 85)  BP: 120/79 (22 @ 13:35) (106/82 - 122/84)  RR: 16 (22 @ 13:35) (16 - 18)  SpO2: 100% (22 @ 13:35) (97% - 100%)  Wt(kg): --  I&O's Summary        Appearance: Normal	  HEENT:  PERRLA   Lymphatic: No lymphadenopathy   Cardiovascular: Normal S1 S2, no JVD  Respiratory: normal effort , clear  Gastrointestinal:  Soft, Non-tender  Skin: No rashes,  warm to touch  Psychiatry:  Mood & affect appropriate  Musculuskeletal: Knee and ankle pain     All labs, Imaging and EKGs personally reviewed                             11.4   7.91  )-----------( 296      ( 30 Dec 2022 05:00 )             35.4               12    140  |  103  |  13  ----------------------------<  85  3.9   |  28  |  0.84    Ca    8.4      30 Dec 2022 05:00  Phos  4.1     12  Mg     2.00     12    TPro  6.9  /  Alb  3.0<L>  /  TBili  0.6  /  DBili  x   /  AST  19  /  ALT  24  /  AlkPhos  117                         Urinalysis Basic - ( 29 Dec 2022 23:01 )    Color: Yellow / Appearance: Clear / S.013 / pH: x  Gluc: x / Ketone: Negative  / Bili: Negative / Urobili: <2 mg/dL   Blood: x / Protein: Negative / Nitrite: Negative   Leuk Esterase: Negative / RBC: x / WBC x   Sq Epi: x / Non Sq Epi: x / Bacteria: x      < from: MR Head w/wo IV Cont (22 @ 13:39) >  IMPRESSION:    Chronic infarcts of the right frontal subcortical and right centrum   semiovale. No new areas of infarct are identified. Unchanged scattered   foci of hemosiderin.    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   Differential diagnosis includes infection, sarcoidosis, lymphoma and   CLIPPERS (chronic lymphocytic inflammation with pontine perivascular   enhancement unresponsive to steroids).

## 2022-12-30 NOTE — CONSULT NOTE ADULT - SUBJECTIVE AND OBJECTIVE BOX
TAMI BARKLEYS  [id number]    HISTORY OF PRESENT ILLNESS:  52-year-old  male presented with PMHx of TIAs (, ), CVAs x3 (2022, 8/3/2022, 2022) w/residual expressive aphasia on Eliquis, dyslipidemia and questionable ASD/PFO admitted after patient was found unresponsive at home on 2022. Patient was found at home by 2 friends and patient was last seen on 2022. Patient was found w/found grunting and minimally responsive.     At the ER, patient was found covered with dried blood in his mouth, and multiple ecchymosis in his arms, face, chest. His VS as remarkable for fever 105.4, tachycardia 130s, /110 mmHg, and tachypnea of 30. Patient was intubated for airway protection and started on empiric antibiotic. The CT head showed old calcification in mid alexis seen on prior studies concerning for calcified cavernoma. CT thoracic and lumbar spine showing degenerative disc disease T6-T7 through L4-L5 and mild disc bulges at L2-L3 through L4-L5 that flatten the ventral thecal sac and narrowing of the bilateral neural foramina      Hospital course:  EEG was performed and show no seizure and only moderate to severe nonspecific diffuse or multifocal cerebral dysfunction. TTE was performed and no PFO was noticed and EF 57%. His CPK 8720 and patient was treated as rhabdomyolysis On 22, patient had a RR w/tongue laceration w/excess blood and posturing like movement. 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  Repeat EEG on 22 showed electrographic seizures captured but R LPDs, rare L frontal discharges, severe GRDA. Patient remained with fever and LP was considered. On 22 patient was extubated and started to following commands. ON  patient had arthrocentesis by ortho on 22 which technically showed blood.  LP was performed on  showed glucose and protein normal, no infection and negative for paraneoplastic encephalitis. Per friend patient was ashwagandha. MRI brain was performed again on  showed b/l lentiform nucleus and pontine enhancement likely leptomeningeal suspicious sarcoidosis, infection, lymphoma and chronic lymphocytic inflammation w/pontine perivascular enhancement       On 22 rheumatology was consulted to r/o sarcoidosis  During our assessment, patient was A&OX4. No sign of fever. Patient did not recall what he did a day before he passed out. Patient denied any disease in his childhood. Patient denied illicit drug use and recent travel. Patient endorsed L foot w/numbness and tingling which was recently noticed.   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 C3/C4 normal. IgG CSF elevated 4.1/ R knee synovial fluid showed RBC 263k, nucleated cell 37k, 98% neutrophile No mediastinal lymphoadenopathy in the CT chest. No lymphoadenopathy in the retroperitoneum.             PAST MEDICAL & SURGICAL HISTORY:  History of TIAs      CVA (cerebrovascular accident)      HLD (hyperlipidemia)      Vertigo      Unresponsiveness      No significant past surgical history          Review of Systems:  See H&P    MEDICATIONS  (STANDING):  apixaban 5 milliGRAM(s) Oral two times a day  aspirin enteric coated 81 milliGRAM(s) Oral daily  atorvastatin 40 milliGRAM(s) Oral at bedtime  bisacodyl Suppository 10 milliGRAM(s) Rectal daily  chlorhexidine 2% Cloths 1 Application(s) Topical daily  dextrose 50% Injectable 25 Gram(s) IV Push once  dextrose 50% Injectable 12.5 Gram(s) IV Push once  dextrose 50% Injectable 25 Gram(s) IV Push once  dextrose Oral Gel 15 Gram(s) Oral once  diphtheria/tetanus/pertussis (acellular) Vaccine (Adacel) 0.5 milliLiter(s) IntraMuscular once  glucagon  Injectable 1 milliGRAM(s) IntraMuscular once  lacosamide IVPB 100 milliGRAM(s) IV Intermittent every 12 hours  levETIRAcetam  IVPB 1500 milliGRAM(s) IV Intermittent every 12 hours  lidocaine   4% Patch 1 Patch Transdermal every 24 hours  multivitamin 1 Tablet(s) Oral daily    MEDICATIONS  (PRN):  oxyCODONE    IR 5 milliGRAM(s) Oral every 6 hours PRN moderate and severe pain  phenazopyridine 100 milliGRAM(s) Oral every 8 hours PRN dysuria  traMADol 25 milliGRAM(s) Oral every 6 hours PRN Moderate Pain (4 - 6)      Allergies    No Known Allergies    Intolerances        PERTINENT MEDICATION HISTORY:    SOCIAL HISTORY:  OCCUPATION:  TRAVEL HISTORY:    FAMILY HISTORY:  No pertinent family history in first degree relatives        Vital Signs Last 24 Hrs  T(C): 36.6 (30 Dec 2022 13:35), Max: 36.8 (29 Dec 2022 22:55)  T(F): 97.8 (30 Dec 2022 13:35), Max: 98.2 (29 Dec 2022 22:55)  HR: 85 (30 Dec 2022 13:35) (78 - 85)  BP: 120/79 (30 Dec 2022 13:35) (106/82 - 122/84)  BP(mean): --  RR: 16 (30 Dec 2022 13:35) (16 - 18)  SpO2: 100% (30 Dec 2022 13:35) (97% - 100%)    Parameters below as of 30 Dec 2022 13:35  Patient On (Oxygen Delivery Method): room air        Physical Exam:  General: No apparent distress  HEENT: EOMI, MMM  CVS: +S1/S2, RRR, no murmurs/rubs/gallops  Resp: CTA b/l. No crackles/wheezing  GI: Soft, NT/ND +BS  MSK: left foot decrease dorsiflexion, decrease knee extension 3/5. L knee mild swelling but cool on palpation.     Neuro: AAOx3  Skin: no visible rashes    LABS:                        11.4   7.91  )-----------( 296      ( 30 Dec 2022 05:00 )             35.4     12-30    140  |  103  |  13  ----------------------------<  85  3.9   |  28  |  0.84    Ca    8.4      30 Dec 2022 05:00  Phos  4.1     12-30  Mg     2.00     12-30    TPro  6.9  /  Alb  3.0<L>  /  TBili  0.6  /  DBili  x   /  AST  19  /  ALT  24  /  AlkPhos  117  12-29      Urinalysis Basic - ( 29 Dec 2022 23:01 )    Color: Yellow / Appearance: Clear / S.013 / pH: x  Gluc: x / Ketone: Negative  / Bili: Negative / Urobili: <2 mg/dL   Blood: x / Protein: Negative / Nitrite: Negative   Leuk Esterase: Negative / RBC: x / WBC x   Sq Epi: x / Non Sq Epi: x / Bacteria: x      C-Reactive Protein, Serum: <3.0 mg/L (22 @ 05:16) CSF IgG Index (12.15.22 @ 12:43)   Quantitative Ig mg/dL   Albumin, Serum: 2085 mg/dL   IgG CSF: 4.1 mg/dL   CSF ALBU: 20.8 mg/dL   IgG/Albumin Ratio, Serum: 0.63 Ratio   IgG/Albumin Ratio, CSF: 0.20 Ratio   IgG Index: 0.3   IgG Synthesis: -13.0 mg/day Culture - Fungal, CSF (12.15.22 @ 12:13)   Specimen Source: .CSF CSF   Culture Results:   No fungus isolated at 1 week. Culture - Acid Fast - CSF (12.15.22 @ 12:13)   Specimen Source: .CSF CSF   Acid Fast Bacilli Smear:   Less than 5 cc of CSF received,   unable to perform AFB smear.   Culture Results:   No acid-fast bacilli isolated at 1 week. ****Acid-fast cultures are held   for 6 weeks.**** Angiotensin Converting Enzyme, CSF: <1.5: Results of this test are labeled for research purposes only   by the assays . The performance   RADIOLOGY & ADDITIONAL STUDIES:  < from: MR Head w/wo IV Cont (22 @ 13:39) >  Chronic infarcts of the right frontal subcortical and right centrum   semiovale. No new areas of infarct are identified. Unchanged scattered   foci of hemosiderin.    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   Differential diagnosis includes infection, sarcoidosis, lymphoma and   CLIPPERS (chronic lymphocytic inflammation with pontine perivascular   enhancement unresponsive to steroids).    < end of copied text >  < from: Xray Foot AP + Lateral + Oblique, Left (22 @ 13:20) >  Frontal oblique lateral left foot from 2022 at 1320. No similar   prior studies available for comparison.    IMPRESSION:  No fractures or dislocations.    Tarsometatarsal alignment maintained without evidence for a Lisfranc   injury.    Congenitally fused 5th DIP joint. Preserved remaining visualized joint   spaces and no joint margin erosions.    No lytic or blastic lesions.    < end of copied text >     TAMI CRISTÓBALRASHEEDS  [id number]    HISTORY OF PRESENT ILLNESS:  52-year-old  male presented with PMHx of TIAs (, ), CVAs x3 (2022, 8/3/2022, 2022) w/residual expressive aphasia on Eliquis, dyslipidemia and questionable ASD/PFO admitted after patient was found unresponsive at home on 2022. Patient was found at home by 2 friends and patient was last seen on 2022. Patient was found w/found grunting and minimally responsive.     At the ER, patient was found covered with dried blood in his mouth, and multiple ecchymosis in his arms, face, chest. His VS as remarkable for fever 105.4, tachycardia 130s, /110 mmHg, and tachypnea of 30. Patient was intubated for airway protection and started on empiric antibiotic. The CT head showed old calcification in mid alexis seen on prior studies concerning for calcified cavernoma. CT thoracic and lumbar spine showing degenerative disc disease T6-T7 through L4-L5 and mild disc bulges at L2-L3 through L4-L5 that flatten the ventral thecal sac and narrowing of the bilateral neural foramina      Hospital course:  EEG was performed and show no seizure and only moderate to severe nonspecific diffuse or multifocal cerebral dysfunction. TTE was performed and no PFO was noticed and EF 57%. His CPK 8720 and patient was treated as rhabdomyolysis On 22, patient had a RR w/tongue laceration w/excess blood and posturing like movement. 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  Repeat EEG on 22 showed electrographic seizures captured but R LPDs, rare L frontal discharges, severe GRDA. Patient remained with fever and LP was considered. On 22 patient was extubated and started to following commands. ON  patient had arthrocentesis of left knee by ortho on 22 which technically showed blood.  LP was performed on  showed glucose and protein normal, no infection and negative for paraneoplastic encephalitis. Per friend patient was ashwagandha. MRI brain was performed again on  showed b/l lentiform nucleus and pontine enhancement likely leptomeningeal suspicious sarcoidosis, infection, lymphoma and chronic lymphocytic inflammation w/pontine perivascular enhancement       On 22 rheumatology was consulted to r/o sarcoidosis  During our assessment, patient was A&OX4. No sign of fever. Patient did not recall what he did a day before he passed out. Patient denied any disease in his childhood. Patient denied illicit drug use and recent travel. Patient endorsed L foot w/numbness and tingling which was recently noticed.   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 C3/C4 normal. IgG CSF elevated 4.1/ R knee synovial fluid showed RBC 263k, nucleated cell 37k, 98% neutrophile No mediastinal lymphoadenopathy in the CT chest. No lymphoadenopathy in the retroperitoneum.             PAST MEDICAL & SURGICAL HISTORY:  History of TIAs      CVA (cerebrovascular accident)      HLD (hyperlipidemia)      Vertigo      Unresponsiveness      No significant past surgical history          Review of Systems:  See H&P    MEDICATIONS  (STANDING):  apixaban 5 milliGRAM(s) Oral two times a day  aspirin enteric coated 81 milliGRAM(s) Oral daily  atorvastatin 40 milliGRAM(s) Oral at bedtime  bisacodyl Suppository 10 milliGRAM(s) Rectal daily  chlorhexidine 2% Cloths 1 Application(s) Topical daily  dextrose 50% Injectable 25 Gram(s) IV Push once  dextrose 50% Injectable 12.5 Gram(s) IV Push once  dextrose 50% Injectable 25 Gram(s) IV Push once  dextrose Oral Gel 15 Gram(s) Oral once  diphtheria/tetanus/pertussis (acellular) Vaccine (Adacel) 0.5 milliLiter(s) IntraMuscular once  glucagon  Injectable 1 milliGRAM(s) IntraMuscular once  lacosamide IVPB 100 milliGRAM(s) IV Intermittent every 12 hours  levETIRAcetam  IVPB 1500 milliGRAM(s) IV Intermittent every 12 hours  lidocaine   4% Patch 1 Patch Transdermal every 24 hours  multivitamin 1 Tablet(s) Oral daily    MEDICATIONS  (PRN):  oxyCODONE    IR 5 milliGRAM(s) Oral every 6 hours PRN moderate and severe pain  phenazopyridine 100 milliGRAM(s) Oral every 8 hours PRN dysuria  traMADol 25 milliGRAM(s) Oral every 6 hours PRN Moderate Pain (4 - 6)      Allergies    No Known Allergies    Intolerances        PERTINENT MEDICATION HISTORY:    SOCIAL HISTORY:  OCCUPATION:  TRAVEL HISTORY:    FAMILY HISTORY:  No pertinent family history in first degree relatives        Vital Signs Last 24 Hrs  T(C): 36.6 (30 Dec 2022 13:35), Max: 36.8 (29 Dec 2022 22:55)  T(F): 97.8 (30 Dec 2022 13:35), Max: 98.2 (29 Dec 2022 22:55)  HR: 85 (30 Dec 2022 13:35) (78 - 85)  BP: 120/79 (30 Dec 2022 13:35) (106/82 - 122/84)  BP(mean): --  RR: 16 (30 Dec 2022 13:35) (16 - 18)  SpO2: 100% (30 Dec 2022 13:35) (97% - 100%)    Parameters below as of 30 Dec 2022 13:35  Patient On (Oxygen Delivery Method): room air        Physical Exam:  General: No apparent distress  HEENT: EOMI, MMM  CVS: +S1/S2, RRR, no murmurs/rubs/gallops  Resp: CTA b/l. No crackles/wheezing  GI: Soft, NT/ND +BS  MSK: left foot decrease dorsiflexion, decrease knee extension 3/5. L knee mild swelling but cool on palpation.     Neuro: AAOx3  Skin: no visible rashes    LABS:                        11.4   7.91  )-----------( 296      ( 30 Dec 2022 05:00 )             35.4     12-30    140  |  103  |  13  ----------------------------<  85  3.9   |  28  |  0.84    Ca    8.4      30 Dec 2022 05:00  Phos  4.1     12-30  Mg     2.00     12-30    TPro  6.9  /  Alb  3.0<L>  /  TBili  0.6  /  DBili  x   /  AST  19  /  ALT  24  /  AlkPhos  117        Urinalysis Basic - ( 29 Dec 2022 23:01 )    Color: Yellow / Appearance: Clear / S.013 / pH: x  Gluc: x / Ketone: Negative  / Bili: Negative / Urobili: <2 mg/dL   Blood: x / Protein: Negative / Nitrite: Negative   Leuk Esterase: Negative / RBC: x / WBC x   Sq Epi: x / Non Sq Epi: x / Bacteria: x      C-Reactive Protein, Serum: <3.0 mg/L (22 @ 05:16) CSF IgG Index (12.15.22 @ 12:43)   Quantitative Ig mg/dL   Albumin, Serum: 2085 mg/dL   IgG CSF: 4.1 mg/dL   CSF ALBU: 20.8 mg/dL   IgG/Albumin Ratio, Serum: 0.63 Ratio   IgG/Albumin Ratio, CSF: 0.20 Ratio   IgG Index: 0.3   IgG Synthesis: -13.0 mg/day Culture - Fungal, CSF (12.15.22 @ 12:13)   Specimen Source: .CSF CSF   Culture Results:   No fungus isolated at 1 week. Culture - Acid Fast - CSF (12.15.22 @ 12:13)   Specimen Source: .CSF CSF   Acid Fast Bacilli Smear:   Less than 5 cc of CSF received,   unable to perform AFB smear.   Culture Results:   No acid-fast bacilli isolated at 1 week. ****Acid-fast cultures are held   for 6 weeks.**** Angiotensin Converting Enzyme, CSF: <1.5: Results of this test are labeled for research purposes only   by the assays . The performance   RADIOLOGY & ADDITIONAL STUDIES:  < from: MR Head w/wo IV Cont (22 @ 13:39) >  Chronic infarcts of the right frontal subcortical and right centrum   semiovale. No new areas of infarct are identified. Unchanged scattered   foci of hemosiderin.    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   Differential diagnosis includes infection, sarcoidosis, lymphoma and   CLIPPERS (chronic lymphocytic inflammation with pontine perivascular   enhancement unresponsive to steroids).    < end of copied text >  < from: Xray Foot AP + Lateral + Oblique, Left (22 @ 13:20) >  Frontal oblique lateral left foot from 2022 at 1320. No similar   prior studies available for comparison.    IMPRESSION:  No fractures or dislocations.    Tarsometatarsal alignment maintained without evidence for a Lisfranc   injury.    Congenitally fused 5th DIP joint. Preserved remaining visualized joint   spaces and no joint margin erosions.    No lytic or blastic lesions.    < end of copied text >     TAMI CRISTÓBALRASHEEDS  [id number]    HISTORY OF PRESENT ILLNESS:  52-year-old  male presented with PMHx of TIAs (, ), CVAs x3 (2022, 8/3/2022, 2022) w/residual expressive aphasia on Eliquis, dyslipidemia and questionable ASD/PFO admitted after patient was found unresponsive at home on 2022. Patient was found at home by 2 friends and patient was last seen on 2022. Patient was found w/found grunting and minimally responsive.     At the ER, patient was found covered with dried blood in his mouth, and multiple ecchymosis in his arms, face, chest. His VS as remarkable for fever 105.4, tachycardia 130s, /110 mmHg, and tachypnea of 30. Patient was intubated for airway protection and started on empiric antibiotic. The CT head showed old calcification in mid alexis seen on prior studies concerning for calcified cavernoma. CT thoracic and lumbar spine showing degenerative disc disease T6-T7 through L4-L5 and mild disc bulges at L2-L3 through L4-L5 that flatten the ventral thecal sac and narrowing of the bilateral neural foramina      Hospital course:  EEG was performed and show no seizure and only moderate to severe nonspecific diffuse or multifocal cerebral dysfunction. TTE was performed and no PFO was noticed and EF 57%. His CPK 8720 and patient was treated as rhabdomyolysis On 22, patient had a RR w/tongue laceration w/excess blood and posturing like movement. 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  Repeat EEG on 22 showed electrographic seizures captured but R LPDs, rare L frontal discharges, severe GRDA. Patient remained with fever and LP was considered. On 22 patient was extubated and started to following commands. ON  patient had arthrocentesis of left knee by ortho on 22 which technically showed blood.  LP was performed on  showed glucose and protein normal, no infection and negative for paraneoplastic encephalitis. Per friend patient was ashwagandha. MRI brain was performed again on  showed b/l lentiform nucleus and pontine enhancement likely leptomeningeal suspicious sarcoidosis, infection, lymphoma and chronic lymphocytic inflammation w/pontine perivascular enhancement       On 22 rheumatology was consulted to r/o sarcoidosis  During our assessment, patient was A&OX4. No sign of fever. Patient did not recall what he did a day before he passed out. Patient denied any disease in his childhood. Patient denied illicit drug use and recent travel. Patient endorsed L foot w/numbness and tingling which was recently noticed.   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 C3/C4 normal. IgG CSF elevated 4.1/ R knee synovial fluid showed RBC 263k, nucleated cell 37k, 98% neutrophile No mediastinal lymphoadenopathy in the CT chest. No lymphoadenopathy in the retroperitoneum.     PAST MEDICAL & SURGICAL HISTORY:  History of TIAs    CVA (cerebrovascular accident)    HLD (hyperlipidemia)    Vertigo    Unresponsiveness    No significant past surgical history      Review of Systems:  See H&P    MEDICATIONS  (STANDING):  apixaban 5 milliGRAM(s) Oral two times a day  aspirin enteric coated 81 milliGRAM(s) Oral daily  atorvastatin 40 milliGRAM(s) Oral at bedtime  bisacodyl Suppository 10 milliGRAM(s) Rectal daily  chlorhexidine 2% Cloths 1 Application(s) Topical daily  dextrose 50% Injectable 25 Gram(s) IV Push once  dextrose 50% Injectable 12.5 Gram(s) IV Push once  dextrose 50% Injectable 25 Gram(s) IV Push once  dextrose Oral Gel 15 Gram(s) Oral once  diphtheria/tetanus/pertussis (acellular) Vaccine (Adacel) 0.5 milliLiter(s) IntraMuscular once  glucagon  Injectable 1 milliGRAM(s) IntraMuscular once  lacosamide IVPB 100 milliGRAM(s) IV Intermittent every 12 hours  levETIRAcetam  IVPB 1500 milliGRAM(s) IV Intermittent every 12 hours  lidocaine   4% Patch 1 Patch Transdermal every 24 hours  multivitamin 1 Tablet(s) Oral daily    MEDICATIONS  (PRN):  oxyCODONE    IR 5 milliGRAM(s) Oral every 6 hours PRN moderate and severe pain  phenazopyridine 100 milliGRAM(s) Oral every 8 hours PRN dysuria  traMADol 25 milliGRAM(s) Oral every 6 hours PRN Moderate Pain (4 - 6)      Allergies    No Known Allergies    Intolerances        PERTINENT MEDICATION HISTORY:  OCCUPATION:  musician    FAMILY HISTORY:  No pertinent family history in first degree relatives        Vital Signs Last 24 Hrs  T(C): 36.6 (30 Dec 2022 13:35), Max: 36.8 (29 Dec 2022 22:55)  T(F): 97.8 (30 Dec 2022 13:35), Max: 98.2 (29 Dec 2022 22:55)  HR: 85 (30 Dec 2022 13:35) (78 - 85)  BP: 120/79 (30 Dec 2022 13:35) (106/82 - 122/84)  RR: 16 (30 Dec 2022 13:35) (16 - 18)  SpO2: 100% (30 Dec 2022 13:35) (97% - 100%)    Parameters below as of 30 Dec 2022 13:35  Patient On (Oxygen Delivery Method): room air    Physical Exam:  General: No apparent distress  HEENT: EOMI, MMM  CVS: +S1/S2, RRR, no murmurs/rubs/gallops  Resp: CTA b/l. No crackles/wheezing  GI: Soft, NT/ND +BS  MSK: left foot decrease dorsiflexion, decrease knee extension 3/5. L knee mild swelling but cool on palpation.     Neuro: AAOx3  Skin: no visible rashes    LABS:                        11.4   7.91  )-----------( 296      ( 30 Dec 2022 05:00 )             35.4     12-30    140  |  103  |  13  ----------------------------<  85  3.9   |  28  |  0.84    Ca    8.4      30 Dec 2022 05:00  Phos  4.1     12-30  Mg     2.00     12-30    TPro  6.9  /  Alb  3.0<L>  /  TBili  0.6  /  DBili  x   /  AST  19  /  ALT  24  /  AlkPhos  117  12-      Urinalysis Basic - ( 29 Dec 2022 23:01 )    Color: Yellow / Appearance: Clear / S.013 / pH: x  Gluc: x / Ketone: Negative  / Bili: Negative / Urobili: <2 mg/dL   Blood: x / Protein: Negative / Nitrite: Negative   Leuk Esterase: Negative / RBC: x / WBC x   Sq Epi: x / Non Sq Epi: x / Bacteria: x      C-Reactive Protein, Serum: <3.0 mg/L (22 @ 05:16) CSF IgG Index (12.15.22 @ 12:43)   Quantitative Ig mg/dL   Albumin, Serum: 2085 mg/dL   IgG CSF: 4.1 mg/dL   CSF ALBU: 20.8 mg/dL   IgG/Albumin Ratio, Serum: 0.63 Ratio   IgG/Albumin Ratio, CSF: 0.20 Ratio   IgG Index: 0.3   IgG Synthesis: -13.0 mg/day Culture - Fungal, CSF (12.15.22 @ 12:13)   Specimen Source: .CSF CSF   Culture Results:   No fungus isolated at 1 week. Culture - Acid Fast - CSF (12.15.22 @ 12:13)   Specimen Source: .CSF CSF   Acid Fast Bacilli Smear:   Less than 5 cc of CSF received,   unable to perform AFB smear.   Culture Results:   No acid-fast bacilli isolated at 1 week. ****Acid-fast cultures are held   for 6 weeks.**** Angiotensin Converting Enzyme, CSF: <1.5: Results of this test are labeled for research purposes only   by the assays . The performance   RADIOLOGY & ADDITIONAL STUDIES:  < from: MR Head w/wo IV Cont (22 @ 13:39) >  Chronic infarcts of the right frontal subcortical and right centrum   semiovale. No new areas of infarct are identified. Unchanged scattered   foci of hemosiderin.    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   Differential diagnosis includes infection, sarcoidosis, lymphoma and   CLIPPERS (chronic lymphocytic inflammation with pontine perivascular   enhancement unresponsive to steroids).    < end of copied text >  < from: Xray Foot AP + Lateral + Oblique, Left (22 @ 13:20) >  Frontal oblique lateral left foot from 2022 at 1320. No similar   prior studies available for comparison.    IMPRESSION:  No fractures or dislocations.    Tarsometatarsal alignment maintained without evidence for a Lisfranc   injury.    Congenitally fused 5th DIP joint. Preserved remaining visualized joint   spaces and no joint margin erosions.    No lytic or blastic lesions.    < end of copied text >

## 2022-12-31 LAB
ANION GAP SERPL CALC-SCNC: 13 MMOL/L — SIGNIFICANT CHANGE UP (ref 7–14)
BUN SERPL-MCNC: 14 MG/DL — SIGNIFICANT CHANGE UP (ref 7–23)
CALCIUM SERPL-MCNC: 8.8 MG/DL — SIGNIFICANT CHANGE UP (ref 8.4–10.5)
CHLORIDE SERPL-SCNC: 104 MMOL/L — SIGNIFICANT CHANGE UP (ref 98–107)
CO2 SERPL-SCNC: 26 MMOL/L — SIGNIFICANT CHANGE UP (ref 22–31)
CREAT SERPL-MCNC: 0.8 MG/DL — SIGNIFICANT CHANGE UP (ref 0.5–1.3)
EGFR: 106 ML/MIN/1.73M2 — SIGNIFICANT CHANGE UP
GLUCOSE SERPL-MCNC: 95 MG/DL — SIGNIFICANT CHANGE UP (ref 70–99)
HAV IGM SER-ACNC: SIGNIFICANT CHANGE UP
HBV CORE AB SER-ACNC: SIGNIFICANT CHANGE UP
HBV CORE IGM SER-ACNC: SIGNIFICANT CHANGE UP
HBV SURFACE AG SER-ACNC: SIGNIFICANT CHANGE UP
HCT VFR BLD CALC: 37.8 % — LOW (ref 39–50)
HCV AB S/CO SERPL IA: 0.09 S/CO — SIGNIFICANT CHANGE UP (ref 0–0.99)
HCV AB SERPL-IMP: SIGNIFICANT CHANGE UP
HGB BLD-MCNC: 11.8 G/DL — LOW (ref 13–17)
MAGNESIUM SERPL-MCNC: 2.2 MG/DL — SIGNIFICANT CHANGE UP (ref 1.6–2.6)
MCHC RBC-ENTMCNC: 29.7 PG — SIGNIFICANT CHANGE UP (ref 27–34)
MCHC RBC-ENTMCNC: 31.2 GM/DL — LOW (ref 32–36)
MCV RBC AUTO: 95.2 FL — SIGNIFICANT CHANGE UP (ref 80–100)
NRBC # BLD: 0 /100 WBCS — SIGNIFICANT CHANGE UP (ref 0–0)
NRBC # FLD: 0 K/UL — SIGNIFICANT CHANGE UP (ref 0–0)
PHOSPHATE SERPL-MCNC: 4.1 MG/DL — SIGNIFICANT CHANGE UP (ref 2.5–4.5)
PLATELET # BLD AUTO: 288 K/UL — SIGNIFICANT CHANGE UP (ref 150–400)
POTASSIUM SERPL-MCNC: 4 MMOL/L — SIGNIFICANT CHANGE UP (ref 3.5–5.3)
POTASSIUM SERPL-SCNC: 4 MMOL/L — SIGNIFICANT CHANGE UP (ref 3.5–5.3)
RBC # BLD: 3.97 M/UL — LOW (ref 4.2–5.8)
RBC # FLD: 14.4 % — SIGNIFICANT CHANGE UP (ref 10.3–14.5)
RHEUMATOID FACT SERPL-ACNC: <10 IU/ML — SIGNIFICANT CHANGE UP (ref 0–13)
SODIUM SERPL-SCNC: 143 MMOL/L — SIGNIFICANT CHANGE UP (ref 135–145)
VIT D25+D1,25 OH+D1,25 PNL SERPL-MCNC: 31.7 PG/ML — SIGNIFICANT CHANGE UP (ref 19.9–79.3)
WBC # BLD: 7.18 K/UL — SIGNIFICANT CHANGE UP (ref 3.8–10.5)
WBC # FLD AUTO: 7.18 K/UL — SIGNIFICANT CHANGE UP (ref 3.8–10.5)

## 2022-12-31 PROCEDURE — 93970 EXTREMITY STUDY: CPT | Mod: 26

## 2022-12-31 RX ORDER — HEPARIN SODIUM 5000 [USP'U]/ML
INJECTION INTRAVENOUS; SUBCUTANEOUS
Qty: 25000 | Refills: 0 | Status: DISCONTINUED | OUTPATIENT
Start: 2023-01-01 | End: 2023-01-04

## 2022-12-31 RX ORDER — HEPARIN SODIUM 5000 [USP'U]/ML
8000 INJECTION INTRAVENOUS; SUBCUTANEOUS EVERY 6 HOURS
Refills: 0 | Status: DISCONTINUED | OUTPATIENT
Start: 2022-12-31 | End: 2023-01-04

## 2022-12-31 RX ORDER — HEPARIN SODIUM 5000 [USP'U]/ML
4000 INJECTION INTRAVENOUS; SUBCUTANEOUS EVERY 6 HOURS
Refills: 0 | Status: DISCONTINUED | OUTPATIENT
Start: 2022-12-31 | End: 2023-01-04

## 2022-12-31 RX ADMIN — APIXABAN 5 MILLIGRAM(S): 2.5 TABLET, FILM COATED ORAL at 06:35

## 2022-12-31 RX ADMIN — APIXABAN 5 MILLIGRAM(S): 2.5 TABLET, FILM COATED ORAL at 17:07

## 2022-12-31 RX ADMIN — LEVETIRACETAM 400 MILLIGRAM(S): 250 TABLET, FILM COATED ORAL at 17:10

## 2022-12-31 RX ADMIN — OXYCODONE HYDROCHLORIDE 5 MILLIGRAM(S): 5 TABLET ORAL at 17:07

## 2022-12-31 RX ADMIN — LIDOCAINE 1 PATCH: 4 CREAM TOPICAL at 20:39

## 2022-12-31 RX ADMIN — Medication 1 TABLET(S): at 17:07

## 2022-12-31 RX ADMIN — OXYCODONE HYDROCHLORIDE 5 MILLIGRAM(S): 5 TABLET ORAL at 23:23

## 2022-12-31 RX ADMIN — LACOSAMIDE 120 MILLIGRAM(S): 50 TABLET ORAL at 06:36

## 2022-12-31 RX ADMIN — OXYCODONE HYDROCHLORIDE 5 MILLIGRAM(S): 5 TABLET ORAL at 08:55

## 2022-12-31 RX ADMIN — CHLORHEXIDINE GLUCONATE 1 APPLICATION(S): 213 SOLUTION TOPICAL at 17:16

## 2022-12-31 RX ADMIN — Medication 81 MILLIGRAM(S): at 17:07

## 2022-12-31 RX ADMIN — LIDOCAINE 1 PATCH: 4 CREAM TOPICAL at 17:13

## 2022-12-31 RX ADMIN — ATORVASTATIN CALCIUM 40 MILLIGRAM(S): 80 TABLET, FILM COATED ORAL at 21:20

## 2022-12-31 RX ADMIN — OXYCODONE HYDROCHLORIDE 5 MILLIGRAM(S): 5 TABLET ORAL at 17:37

## 2022-12-31 RX ADMIN — LEVETIRACETAM 400 MILLIGRAM(S): 250 TABLET, FILM COATED ORAL at 06:36

## 2022-12-31 RX ADMIN — LACOSAMIDE 120 MILLIGRAM(S): 50 TABLET ORAL at 17:08

## 2022-12-31 RX ADMIN — OXYCODONE HYDROCHLORIDE 5 MILLIGRAM(S): 5 TABLET ORAL at 09:30

## 2022-12-31 NOTE — PROGRESS NOTE ADULT - SUBJECTIVE AND OBJECTIVE BOX
Patient is a 52y old  Male who presents with a chief complaint of Unresponsive (30 Dec 2022 18:18)       INTERVAL HPI/OVERNIGHT EVENTS:  Patient seen and evaluated at bedside.  Pt is resting comfortable in NAD. Denies N/V/F/C.  Pain rated at X/10    Allergies    No Known Allergies    Intolerances        Vital Signs Last 24 Hrs  T(C): 36.4 (31 Dec 2022 06:00), Max: 36.7 (30 Dec 2022 21:14)  T(F): 97.5 (31 Dec 2022 06:00), Max: 98.1 (30 Dec 2022 21:14)  HR: 70 (31 Dec 2022 06:00) (70 - 90)  BP: 116/84 (31 Dec 2022 06:00) (111/61 - 120/79)  BP(mean): --  RR: 17 (31 Dec 2022 06:00) (16 - 17)  SpO2: 100% (31 Dec 2022 06:00) (99% - 100%)    Parameters below as of 31 Dec 2022 06:00  Patient On (Oxygen Delivery Method): room air        LABS:                        11.8   7.18  )-----------( 288      ( 31 Dec 2022 06:00 )             37.8     12-31    143  |  104  |  14  ----------------------------<  95  4.0   |  26  |  0.80    Ca    8.8      31 Dec 2022 06:00  Phos  4.1     12-31  Mg     2.20     12-31        Urinalysis Basic - ( 29 Dec 2022 23:01 )    Color: Yellow / Appearance: Clear / S.013 / pH: x  Gluc: x / Ketone: Negative  / Bili: Negative / Urobili: <2 mg/dL   Blood: x / Protein: Negative / Nitrite: Negative   Leuk Esterase: Negative / RBC: x / WBC x   Sq Epi: x / Non Sq Epi: x / Bacteria: x      CAPILLARY BLOOD GLUCOSE          Lower Extremity Physical Exam:  Vascular: DP/PT 2/4, B/L, CFT <3 seconds B/L, Temperature gradient WNL, B/L.   Neuro: Epicritic sensation intact right foot, diminished 0/4 ipswich light touch left foot  Musculoskeletal/Ortho: mild plantarflexed angle, reducible, 0/5 dorsiflexion left side and 4/5 eversion/inversion/plantarflexion - right is WNL  Skin: eschar left lateral fifth metatarsal base, no open lesion grossly, no signs of infection no erythema nor edema bilateral    RADIOLOGY & ADDITIONAL TESTS:

## 2022-12-31 NOTE — PROGRESS NOTE ADULT - PROBLEM SELECTOR PLAN 1
found unresponsive at home   CTH with no acute findings  s/p repeat MRI. ? systemic lymphoma or sarcoid   initially vEEG with no identified seizures  intubated 12/2, extubated 12/4  MRI H - right frontal subcortical and right centrum semiovale infarcts unchanged

## 2022-12-31 NOTE — CHART NOTE - NSCHARTNOTEFT_GEN_A_CORE
Per Verbal report from Vascular lab, doppler came back with L posterior tibial DVT and L peroneal DVT. Pt was already on Eliquis - Eliquis switched to heparin gtt. SCD's taken off patient. Heme/onc on board. Discussed with Dr. Diez.    Of note, Heme/Onc was consulted yesterday for r/o lymphoma for leptomeningeal enhancement seen on MRI and recommended repeat CT CAP w/ IV con to r/o systemic dz. Order placed. Discussed with Dr. Diez.

## 2022-12-31 NOTE — PROGRESS NOTE ADULT - ASSESSMENT
52-year-old male with a PMHx significant for TIAs (2011, 2013), CVAs x3 (02/2022 s/p tPA, 8/3/2022 s/p tPA, 11/5/2022 with residual expressive aphasia) on Eliquis, and HLD BIBEMS after being found unresponsive at home on the floor, last known well 12/1 at around noon. C-collar was placed. Patient was intubated in the ED for airway protection. CTH with no acute findings, vEEG with no identified seizures. Patient was weaned off pressors, extubated, and transitioned to floors 12/4/22. 12/5 am RRT called for seizure like activity with urinary incontinence, tongue biting, and R>L posturing, patient s/p ativan 2mg x3. Anesthesia called to RRT for intubation. MICU accepting patient for seizure like activity requiring intubation.      #AMS, Seizure like activity  EEG 12/7: concern for epileptiform activity; though EEG from 12/5 without such abnormalities.   - f/u neuro recs  - Patient found down and unresponsive at home on 12/2, last known well 12/1 at around noon. Intubated in ED 12/2, extubated in MICU 12/4  - RRT 12/5: seizure like acting with urinary incontinence, convulsions, and tongue biting; s/p ativan 2mg x3 with pauses in seizure activity following each; intubated for airway protection with rocuronium. Prolactin ~75   - CTH and CT cervical spine 12/2 negative for any acute pathologies. Tox screen on admission negative. 12/5: Stable exam from prior CT head, chronic microvascular ischemic changes, parenchymal loss, dystrophic calcification of central portion of alexis unchanged.  - Video EEG 12/3-12/4 without any seizure like activity, moderate to severe nonspecific diffuse or multifocal cerebral dysfunction  - c/w keppra 1500mg q12 maintenance, Keppra loaded 4.5g  - patient extubated again on 12/8th  -MRI noted, chronic CVA, no acute CVA noted     MRI noted , new findings for leptomeningeal disease, R/O sarcoid vs lymphoma   Hematology and rheum eval called  repeat scan per heme    LP   LE DVT study         #CVAs/TIAs  - Hx of TIAs in 2011 and 2013, CVAs x3 in 02/22, 8/22, and 11/22 with residual expressive aphasia   - on Eliquis and Aspirin at home for hx of stroke  - PFO work up in past negative, no evidence of PFO on MIMI X2  - c/w ASA   - c/w heparin gtt, was on hold, resume eliquis   - Restart Statin when LFTs and CPK improve per neurology  - Being worked up for Mixed Connective Tissue Disease OP- f/u p-ANCA, c-ANCA/ [id number] negative  - Neuro recs appreciated  - repeat brain MRI   - LE pain, check MRI     # Hypernatremia  tredn Na level   renal eval appreciated  S/P hydraiton, trend Na level     #Thecal sac flattening   - CT thoracic and lumbar spine showing degenerative disc disease T6-T7 through L4-L5 and mild disc bulges at L2-L3 through L4-L5 that flatten the ventral thecal sac and narrowing of the bilateral neural foramina  - Will Monitor for now, will consider neuro/neurosurg evaluation in the future    #HLD  - atorvastatin on hold due to elevated CK and LFTs  - restart when possible for secondary stroke prevention    #?ASD/PFO  - Patient reportedly found to have a PFO in February 2022 during a stroke workup at [place]. Patient presented for a PFO closure in November 2022. Notably, no PFO was found at the time and no intervention was performed by Dr. Lynn.    - TTE 11/5/22 EF 57% and grossly normal LV function  - MIMI performed bedside 12/5 1 of 2 studies (+) on bubble study (uploaded to [employer])  - elevated D d-andrew, Check LE DVT study       #Rhabdomyolysis  - CK 8720 on admission, peaked at 15k, now downtrending  - DDx: prolonged downtime myositis v hyperthermia? (T 105.4 F) v sepsis v seizure induced    - L Ankle adn Knee pain, swelling  S/P Tap last week  cont to have pain and tenderness and swelling  Repeat imaging   Ortho follow up         # Elevated Winston level  CHeck Abd US noted    GI eval PRN   Trend LFTs , normalized, no need for MRCP       #Partial SBO - resolved  - CT chest, abdomen, and pelvis w/ contrast concerning for possible partial SBO without transition point.  - Surgery consulted, no acute intervention at this time  - 12/5 KUB - negative for ileus or SBO  - Hold TF for 12/7 for possible trial of extubation.  - otherwise diet per nutrition      #Leukocytosis  - worsening leukocytosis  - monitor for fever  - Ortho eval fro knee swelling   - Pan Cx  - LP by IR , follow up results         #Concern for sepsis  Unclear source, aspiration v less likely meningitis   BCx (12/2 NGTD repeat 12/5 NGTD) and UC 12/2 NGTD  - Patient initially presented with AMS, T 105.4, tachycardic, and tachypneic   - UA negative  - s/p vanco and zosyn x1 in ED 12/2, ceftriaxone 2g BID 12/3-12/4, vanco 12/3-12/4  - 12/7 DCed Vanc since BCx from 12/5 NGTD  - c/w zosyn for aspiration pna presumed. completed course, monitor   - ID eval appreciated  - febrile again, Ortho for knee asp      # ANemia  noted, no gross bleeding  type and screen  GI eval     Discussed with patient's family over the phone

## 2022-12-31 NOTE — PROGRESS NOTE ADULT - SUBJECTIVE AND OBJECTIVE BOX
Subjective: Patient seen and examined. No new events except as noted.     REVIEW OF SYSTEMS:    CONSTITUTIONAL: +weakness, fevers or chills  EYES/ENT: No visual changes;  No vertigo or throat pain   NECK: No pain or stiffness  RESPIRATORY: No cough, wheezing, hemoptysis; No shortness of breath  CARDIOVASCULAR: No chest pain or palpitations  GASTROINTESTINAL: No abdominal or epigastric pain. No nausea, vomiting, or hematemesis; No diarrhea or constipation. No melena or hematochezia.  GENITOURINARY: No dysuria, frequency or hematuria  NEUROLOGICAL: No numbness or weakness  SKIN: No itching, burning, rashes, or lesions   All other review of systems is negative unless indicated above.    MEDICATIONS:  MEDICATIONS  (STANDING):  aspirin enteric coated 81 milliGRAM(s) Oral daily  atorvastatin 40 milliGRAM(s) Oral at bedtime  bisacodyl Suppository 10 milliGRAM(s) Rectal daily  chlorhexidine 2% Cloths 1 Application(s) Topical daily  dextrose 50% Injectable 25 Gram(s) IV Push once  dextrose 50% Injectable 12.5 Gram(s) IV Push once  dextrose 50% Injectable 25 Gram(s) IV Push once  dextrose Oral Gel 15 Gram(s) Oral once  diphtheria/tetanus/pertussis (acellular) Vaccine (Adacel) 0.5 milliLiter(s) IntraMuscular once  glucagon  Injectable 1 milliGRAM(s) IntraMuscular once  heparin  Infusion.  Unit(s)/Hr (18 mL/Hr) IV Continuous <Continuous>  lacosamide IVPB 100 milliGRAM(s) IV Intermittent every 12 hours  levETIRAcetam  IVPB 1500 milliGRAM(s) IV Intermittent every 12 hours  lidocaine   4% Patch 1 Patch Transdermal every 24 hours  multivitamin 1 Tablet(s) Oral daily      PHYSICAL EXAM:  T(C): 36.8 (12-31-22 @ 12:00), Max: 36.8 (12-31-22 @ 12:00)  HR: 76 (12-31-22 @ 12:00) (70 - 82)  BP: 112/70 (12-31-22 @ 12:00) (112/70 - 116/84)  RR: 17 (12-31-22 @ 12:00) (17 - 17)  SpO2: 99% (12-31-22 @ 12:00) (99% - 100%)  Wt(kg): --  I&O's Summary        Appearance: Normal	  HEENT:   Normal oral mucosa, PERRL, EOMI	  Lymphatic: No lymphadenopathy , no edema  Cardiovascular: Normal S1 S2, No JVD, No murmurs , Peripheral pulses palpable 2+ bilaterally  Respiratory: Lungs clear to auscultation, normal effort 	  Gastrointestinal:  Soft, Non-tender, + BS	  Skin: No rashes, No ecchymoses, No cyanosis, warm to touch  Musculoskeletal: Normal range of motion, normal strength  Psychiatry:  Mood & affect appropriate  Ext: No edema      LABS:    CARDIAC MARKERS:                                11.8   7.18  )-----------( 288      ( 31 Dec 2022 06:00 )             37.8     12-31    143  |  104  |  14  ----------------------------<  95  4.0   |  26  |  0.80    Ca    8.8      31 Dec 2022 06:00  Phos  4.1     12-31  Mg     2.20     12-31      proBNP:   Lipid Profile:   HgA1c:   TSH:     Negative          TELEMETRY: 	    ECG:  	  RADIOLOGY:   DIAGNOSTIC TESTING:  [ ] Echocardiogram:  [ ]  Catheterization:  [ ] Stress Test:    OTHER:

## 2022-12-31 NOTE — PROGRESS NOTE ADULT - SUBJECTIVE AND OBJECTIVE BOX
Name of Patient : TAMI DUNHAM  MRN: [id number]  Date of visit: 12-31-22       Subjective: Patient seen and examined. No new events except as noted.   calm,     REVIEW OF SYSTEMS:    CONSTITUTIONAL: No weakness, fevers or chills  EYES/ENT: No visual changes;  No vertigo or throat pain   NECK: No pain or stiffness  RESPIRATORY: No cough, wheezing, hemoptysis; No shortness of breath  CARDIOVASCULAR: No chest pain or palpitations  GASTROINTESTINAL: No abdominal or epigastric pain. No nausea, vomiting, or hematemesis; No diarrhea or constipation. No melena or hematochezia.  GENITOURINARY: No dysuria, frequency or hematuria  NEUROLOGICAL: L knee and ankle pain   SKIN: No itching, burning, rashes, or lesions   All other review of systems is negative unless indicated above.    MEDICATIONS:  MEDICATIONS  (STANDING):  aspirin enteric coated 81 milliGRAM(s) Oral daily  atorvastatin 40 milliGRAM(s) Oral at bedtime  bisacodyl Suppository 10 milliGRAM(s) Rectal daily  chlorhexidine 2% Cloths 1 Application(s) Topical daily  dextrose 50% Injectable 25 Gram(s) IV Push once  dextrose 50% Injectable 12.5 Gram(s) IV Push once  dextrose 50% Injectable 25 Gram(s) IV Push once  dextrose Oral Gel 15 Gram(s) Oral once  diphtheria/tetanus/pertussis (acellular) Vaccine (Adacel) 0.5 milliLiter(s) IntraMuscular once  glucagon  Injectable 1 milliGRAM(s) IntraMuscular once  heparin  Infusion.  Unit(s)/Hr (18 mL/Hr) IV Continuous <Continuous>  lacosamide IVPB 100 milliGRAM(s) IV Intermittent every 12 hours  levETIRAcetam  IVPB 1500 milliGRAM(s) IV Intermittent every 12 hours  lidocaine   4% Patch 1 Patch Transdermal every 24 hours  multivitamin 1 Tablet(s) Oral daily      PHYSICAL EXAM:  T(C): 36.9 (12-31-22 @ 21:19), Max: 36.9 (12-31-22 @ 21:19)  HR: 80 (12-31-22 @ 23:00) (70 - 80)  BP: 118/79 (12-31-22 @ 23:00) (112/70 - 123/78)  RR: 17 (12-31-22 @ 21:19) (17 - 17)  SpO2: 98% (12-31-22 @ 21:19) (98% - 100%)  Wt(kg): --  I&O's Summary    31 Dec 2022 07:01  -  31 Dec 2022 23:03  --------------------------------------------------------  IN: 120 mL / OUT: 200 mL / NET: -80 mL          Appearance: Normal	  HEENT:  PERRLA   Lymphatic: No lymphadenopathy   Cardiovascular: Normal S1 S2, no JVD  Respiratory: normal effort , clear  Gastrointestinal:  Soft, Non-tender  Skin: No rashes,  warm to touch  Psychiatry:  Mood & affect appropriate  Musculuskeletal: knee pain       All labs, Imaging and EKGs personally reviewed   12-31-22 @ 07:01  -  12-31-22 @ 23:03  --------------------------------------------------------  IN: 120 mL / OUT: 200 mL / NET: -80 mL                            11.8   7.18  )-----------( 288      ( 31 Dec 2022 06:00 )             37.8               12-31    143  |  104  |  14  ----------------------------<  95  4.0   |  26  |  0.80    Ca    8.8      31 Dec 2022 06:00  Phos  4.1     12-31  Mg     2.20     12-31

## 2023-01-01 LAB
ANION GAP SERPL CALC-SCNC: 10 MMOL/L — SIGNIFICANT CHANGE UP (ref 7–14)
APTT BLD: 107.6 SEC — HIGH (ref 27–36.3)
APTT BLD: 30.8 SEC — SIGNIFICANT CHANGE UP (ref 27–36.3)
APTT BLD: 76.4 SEC — HIGH (ref 27–36.3)
BUN SERPL-MCNC: 12 MG/DL — SIGNIFICANT CHANGE UP (ref 7–23)
CALCIUM SERPL-MCNC: 8.5 MG/DL — SIGNIFICANT CHANGE UP (ref 8.4–10.5)
CHLORIDE SERPL-SCNC: 104 MMOL/L — SIGNIFICANT CHANGE UP (ref 98–107)
CO2 SERPL-SCNC: 25 MMOL/L — SIGNIFICANT CHANGE UP (ref 22–31)
CREAT SERPL-MCNC: 0.76 MG/DL — SIGNIFICANT CHANGE UP (ref 0.5–1.3)
EGFR: 108 ML/MIN/1.73M2 — SIGNIFICANT CHANGE UP
GLUCOSE SERPL-MCNC: 94 MG/DL — SIGNIFICANT CHANGE UP (ref 70–99)
HCT VFR BLD CALC: 35.2 % — LOW (ref 39–50)
HCT VFR BLD CALC: 35.4 % — LOW (ref 39–50)
HGB BLD-MCNC: 11.3 G/DL — LOW (ref 13–17)
HGB BLD-MCNC: 11.4 G/DL — LOW (ref 13–17)
MAGNESIUM SERPL-MCNC: 2.1 MG/DL — SIGNIFICANT CHANGE UP (ref 1.6–2.6)
MCHC RBC-ENTMCNC: 29.4 PG — SIGNIFICANT CHANGE UP (ref 27–34)
MCHC RBC-ENTMCNC: 29.5 PG — SIGNIFICANT CHANGE UP (ref 27–34)
MCHC RBC-ENTMCNC: 31.9 GM/DL — LOW (ref 32–36)
MCHC RBC-ENTMCNC: 32.4 GM/DL — SIGNIFICANT CHANGE UP (ref 32–36)
MCV RBC AUTO: 91.2 FL — SIGNIFICANT CHANGE UP (ref 80–100)
MCV RBC AUTO: 92.2 FL — SIGNIFICANT CHANGE UP (ref 80–100)
NRBC # BLD: 0 /100 WBCS — SIGNIFICANT CHANGE UP (ref 0–0)
NRBC # BLD: 0 /100 WBCS — SIGNIFICANT CHANGE UP (ref 0–0)
NRBC # FLD: 0 K/UL — SIGNIFICANT CHANGE UP (ref 0–0)
NRBC # FLD: 0 K/UL — SIGNIFICANT CHANGE UP (ref 0–0)
PHOSPHATE SERPL-MCNC: 4.2 MG/DL — SIGNIFICANT CHANGE UP (ref 2.5–4.5)
PLATELET # BLD AUTO: 243 K/UL — SIGNIFICANT CHANGE UP (ref 150–400)
PLATELET # BLD AUTO: 267 K/UL — SIGNIFICANT CHANGE UP (ref 150–400)
POTASSIUM SERPL-MCNC: 3.9 MMOL/L — SIGNIFICANT CHANGE UP (ref 3.5–5.3)
POTASSIUM SERPL-SCNC: 3.9 MMOL/L — SIGNIFICANT CHANGE UP (ref 3.5–5.3)
RBC # BLD: 3.84 M/UL — LOW (ref 4.2–5.8)
RBC # BLD: 3.86 M/UL — LOW (ref 4.2–5.8)
RBC # FLD: 14.1 % — SIGNIFICANT CHANGE UP (ref 10.3–14.5)
RBC # FLD: 14.2 % — SIGNIFICANT CHANGE UP (ref 10.3–14.5)
SODIUM SERPL-SCNC: 139 MMOL/L — SIGNIFICANT CHANGE UP (ref 135–145)
WBC # BLD: 5.99 K/UL — SIGNIFICANT CHANGE UP (ref 3.8–10.5)
WBC # BLD: 7.64 K/UL — SIGNIFICANT CHANGE UP (ref 3.8–10.5)
WBC # FLD AUTO: 5.99 K/UL — SIGNIFICANT CHANGE UP (ref 3.8–10.5)
WBC # FLD AUTO: 7.64 K/UL — SIGNIFICANT CHANGE UP (ref 3.8–10.5)

## 2023-01-01 PROCEDURE — 74177 CT ABD & PELVIS W/CONTRAST: CPT | Mod: 26

## 2023-01-01 PROCEDURE — 71260 CT THORAX DX C+: CPT | Mod: 26

## 2023-01-01 RX ADMIN — LACOSAMIDE 120 MILLIGRAM(S): 50 TABLET ORAL at 05:37

## 2023-01-01 RX ADMIN — LEVETIRACETAM 400 MILLIGRAM(S): 250 TABLET, FILM COATED ORAL at 17:27

## 2023-01-01 RX ADMIN — LACOSAMIDE 120 MILLIGRAM(S): 50 TABLET ORAL at 17:27

## 2023-01-01 RX ADMIN — Medication 81 MILLIGRAM(S): at 11:43

## 2023-01-01 RX ADMIN — LIDOCAINE 1 PATCH: 4 CREAM TOPICAL at 17:30

## 2023-01-01 RX ADMIN — HEPARIN SODIUM 1800 UNIT(S)/HR: 5000 INJECTION INTRAVENOUS; SUBCUTANEOUS at 13:10

## 2023-01-01 RX ADMIN — CHLORHEXIDINE GLUCONATE 1 APPLICATION(S): 213 SOLUTION TOPICAL at 11:50

## 2023-01-01 RX ADMIN — LEVETIRACETAM 400 MILLIGRAM(S): 250 TABLET, FILM COATED ORAL at 05:37

## 2023-01-01 RX ADMIN — Medication 1 TABLET(S): at 11:43

## 2023-01-01 RX ADMIN — HEPARIN SODIUM 1800 UNIT(S)/HR: 5000 INJECTION INTRAVENOUS; SUBCUTANEOUS at 19:11

## 2023-01-01 RX ADMIN — ATORVASTATIN CALCIUM 40 MILLIGRAM(S): 80 TABLET, FILM COATED ORAL at 21:09

## 2023-01-01 RX ADMIN — TRAMADOL HYDROCHLORIDE 25 MILLIGRAM(S): 50 TABLET ORAL at 17:26

## 2023-01-01 RX ADMIN — HEPARIN SODIUM 1800 UNIT(S)/HR: 5000 INJECTION INTRAVENOUS; SUBCUTANEOUS at 07:15

## 2023-01-01 RX ADMIN — HEPARIN SODIUM 1800 UNIT(S)/HR: 5000 INJECTION INTRAVENOUS; SUBCUTANEOUS at 05:50

## 2023-01-01 RX ADMIN — OXYCODONE HYDROCHLORIDE 5 MILLIGRAM(S): 5 TABLET ORAL at 00:20

## 2023-01-01 RX ADMIN — LIDOCAINE 1 PATCH: 4 CREAM TOPICAL at 04:50

## 2023-01-01 RX ADMIN — OXYCODONE HYDROCHLORIDE 5 MILLIGRAM(S): 5 TABLET ORAL at 11:47

## 2023-01-01 RX ADMIN — HEPARIN SODIUM 1600 UNIT(S)/HR: 5000 INJECTION INTRAVENOUS; SUBCUTANEOUS at 20:43

## 2023-01-01 NOTE — PROGRESS NOTE ADULT - SUBJECTIVE AND OBJECTIVE BOX
Subjective: Patient seen and examined. No new events except as noted.   feels ok     REVIEW OF SYSTEMS:    CONSTITUTIONAL: No weakness, fevers or chills  EYES/ENT: No visual changes;  No vertigo or throat pain   NECK: No pain or stiffness  RESPIRATORY: No cough, wheezing, hemoptysis; No shortness of breath  CARDIOVASCULAR: No chest pain or palpitations  GASTROINTESTINAL: No abdominal or epigastric pain. No nausea, vomiting, or hematemesis; No diarrhea or constipation. No melena or hematochezia.  GENITOURINARY: No dysuria, frequency or hematuria  NEUROLOGICAL: No numbness or weakness  SKIN: No itching, burning, rashes, or lesions   All other review of systems is negative unless indicated above.    MEDICATIONS:  MEDICATIONS  (STANDING):  aspirin enteric coated 81 milliGRAM(s) Oral daily  atorvastatin 40 milliGRAM(s) Oral at bedtime  bisacodyl Suppository 10 milliGRAM(s) Rectal daily  chlorhexidine 2% Cloths 1 Application(s) Topical daily  dextrose 50% Injectable 25 Gram(s) IV Push once  dextrose 50% Injectable 12.5 Gram(s) IV Push once  dextrose 50% Injectable 25 Gram(s) IV Push once  dextrose Oral Gel 15 Gram(s) Oral once  diphtheria/tetanus/pertussis (acellular) Vaccine (Adacel) 0.5 milliLiter(s) IntraMuscular once  glucagon  Injectable 1 milliGRAM(s) IntraMuscular once  heparin  Infusion.  Unit(s)/Hr (18 mL/Hr) IV Continuous <Continuous>  lacosamide IVPB 100 milliGRAM(s) IV Intermittent every 12 hours  levETIRAcetam  IVPB 1500 milliGRAM(s) IV Intermittent every 12 hours  lidocaine   4% Patch 1 Patch Transdermal every 24 hours  multivitamin 1 Tablet(s) Oral daily      PHYSICAL EXAM:  T(C): 36.8 (01-01-23 @ 04:17), Max: 36.9 (12-31-22 @ 21:19)  HR: 76 (01-01-23 @ 04:17) (76 - 80)  BP: 124/82 (01-01-23 @ 04:17) (112/70 - 124/82)  RR: 16 (01-01-23 @ 04:17) (16 - 17)  SpO2: 99% (01-01-23 @ 04:17) (98% - 99%)  Wt(kg): --  I&O's Summary    31 Dec 2022 07:01  -  01 Jan 2023 07:00  --------------------------------------------------------  IN: 294 mL / OUT: 1100 mL / NET: -806 mL          Appearance: Normal	  HEENT:   Normal oral mucosa, PERRL, EOMI	  Lymphatic: No lymphadenopathy , no edema  Cardiovascular: Normal S1 S2, No JVD, No murmurs , Peripheral pulses palpable 2+ bilaterally  Respiratory: Lungs clear to auscultation, normal effort 	  Gastrointestinal:  Soft, Non-tender, + BS	  Skin: No rashes, No ecchymoses, No cyanosis, warm to touch  Musculoskeletal: Normal range of motion, normal strength  Psychiatry:  Mood & affect appropriate  Ext: No edema      LABS:    CARDIAC MARKERS:    Flu With COVID-19 By ADRIAN (12.27.22 @ 16:26)   SARS-CoV-2 Result: Detected: This Respiratory Panel uses polymerase chain reaction (PCR) to detect for   influenza A; influenza B; respiratory syncytial virus; and SARS-CoV-2.   This test was validated by [employer] and is in use under the FDA   Emergency Use Authorization (EUA) for clinical labs CLIA-certified to   perform high complexity testing. Test results should be correlated with   clinical presentation, patient history, and epidemiology.   Influenza A Result: NotDetec   Influenza B Result: NotDetec   Resp Syn Virus Result: NotDetec                                 11.4   7.64  )-----------( 267      ( 01 Jan 2023 04:45 )             35.2     01-01    139  |  104  |  12  ----------------------------<  94  3.9   |  25  |  0.76    Ca    8.5      01 Jan 2023 04:45  Phos  4.2     01-01  Mg     2.10     01-01      proBNP:   Lipid Profile:   HgA1c:   TSH:     Negative          TELEMETRY: 	    ECG:  	  RADIOLOGY:   DIAGNOSTIC TESTING:  [ ] Echocardiogram:  [ ]  Catheterization:  [ ] Stress Test:    OTHER:

## 2023-01-01 NOTE — PROGRESS NOTE ADULT - ASSESSMENT
52-year-old  M known to me from outpatient setting with  TIAs (2011, 2013), Strokes x3 (02/2022 s/p tPA, 8/3/2022 s/p tPA, 11/5/2022 with residual expressive aphasia) on Eliquis, was on testosterone outpatient stopped after last stroke, HLD BIBEMS after being found unresponsive  Temp 105.4 on arrival tachy and /110. 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   CT cervical spine and maxillofacial scans negative.  CT chest, abdomen, and pelvis w/ contrast concerning for possible partial SBO without transition point. Surgery consulted in ED, no acute surgical intervention at this time.   CT thoracic and lumbar spine showing degenerative disc disease T6-T7 through L4-L5 and mild disc bulges at L2-L3 through L4-L5 that flatten the ventral thecal sac and narrowing of the bilateral neural foramina.  MRI 11/2022: R centrum semiovale and R posterior frontal infarcts   takes adderall at home   + rhabdo  EEG no seizures   + transaminitis   CTH 12/5 stable   outpatient hypercoag workup neg   12/5 extubated then reintibuated for seizure activity and tx back to ICU   EEG this AM 12/5 with only slowing   EEG 12/6 slowing but no seizures   spoke with Pippa Conti (friend) who states after he was taken off the testosterone he ordered a mail order testosterone supplement, unclear if he started it yet, unclear what this was  12/7 EEG no electrogtraphic seizures captured but R LPDs, rare L frontal discharges, severe GRDA.   12/8 EEG improved.  extubated. following some commands   12/9 moving uppers> lowers   12/11 AAOx2 uppers 4-5/5; not moving LLE well   MRI brain neg for new stroke   s/p L knee tap arthrocentesis  by ortho on 12/12   spoke with friend pippa conti - she counted his adderrall and didn't take extra tables. did find ashwagandha and red wood (bottle was sealed) supplement in his apartment   s/p LP 12/5 --> CSF glucose and protein WNL.  will f/u remaining studies. non infectious appearing --> paraneoplastic was neg   o/e AAOx2, slurred but 2/2 tongue bite   + transaminitis   more alert  12/20 neuro exam improving AAOx2-3   c/o L ankle pain in addition to knee  + COVID   MRI brain 12/29: chronic R frontal and R centrum semiovale infarcts unchanged.  more conspicuous and newer bilateral lentiform nucleus and pontine enhancement of unclear etiology. ddx CLIPPERS, sarcoid, lymphoma vs infection.  (reviewed [facility] neuro radiology Dr. fried)     Impression:   1) AMS of unclear etiology, possible now seizure, related to adderral withdrawal? 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 had MIMI was question of PFO but not found. s/p ILR   3) bilateral lentiform nucleus and pontine enhacement     - f/u MRI L spine ordered and MR Lower extremity  - CT C A P   - unclear of what to make of new MRI findings from 12/29, doubt related to covid as was present minimally on prior MRI.   lower suspicion for CLIPPERS.  would workup for lymphoma and sarcoid at this point.  if no etiology found would treat with high dose steroids 500mg BID x 3-5 days.  may need to consider repeat LP as well.  f/u with heme/onc as well as rheumatology   - covid precuations    -  L ankle imaging --. podiatry.   - resume AC s/p LP when able given L knee --> eliquis 5mg BID restarted but now changed to heparin drip   - possible MRCP planning for Monday. GI f/u.  elevated LFTs --> LFTs improved--> downtrending . MRCP deferred   - ortho for L knee s/p tap / arthrocentesis   -  Vimpat 100mg BID    - keppra 1500mg BID.   - fever on arrival, treatred initially for meningitis.  was on abx for aspiration PNA.  >LP done--> non  infectious   - IVF  - CPK elevated. trend improving   -   endocrine workup/eval   - there was some concern outpatient he may have a mixed  connective tissue disorder   - statin therapy for secondary stroke prevention when LFTS and CPK improved   - telemetry  - PT/OT/SS/SLP, OOBC  - check FS, glucose control <180  - GI/DVT ppx  - Thank you for allowing me to participate in the care of this patient. Call with questions.   - spoke with friend at bedside, [place] 12/11   - spoke with Pippa Conti at [phone] for more collateral info and to provide update. spoke again 12/12 over phone. spoke 12/19   spoke with parents at bedside 12/20   d/c raquel bender now covid auth good until 12/31   Braxton Forde MD  Vascular Neurology  Office: [phone]

## 2023-01-01 NOTE — PROGRESS NOTE ADULT - ASSESSMENT
52-year-old male with a PMHx significant for TIAs (2011, 2013), CVAs x3 (02/2022 s/p tPA, 8/3/2022 s/p tPA, 11/5/2022 with residual expressive aphasia) on Eliquis, and HLD BIBEMS after being found unresponsive at home on the floor, last known well 12/1 at around noon. C-collar was placed. Patient was intubated in the ED for airway protection. CTH with no acute findings, vEEG with no identified seizures. Patient was weaned off pressors, extubated, and transitioned to floors 12/4/22. 12/5 am RRT called for seizure like activity with urinary incontinence, tongue biting, and R>L posturing, patient s/p ativan 2mg x3. Anesthesia called to RRT for intubation. MICU accepting patient for seizure like activity requiring intubation.      #AMS, Seizure like activity  EEG 12/7: concern for epileptiform activity; though EEG from 12/5 without such abnormalities.   - f/u neuro recs  - Patient found down and unresponsive at home on 12/2, last known well 12/1 at around noon. Intubated in ED 12/2, extubated in MICU 12/4  - RRT 12/5: seizure like acting with urinary incontinence, convulsions, and tongue biting; s/p ativan 2mg x3 with pauses in seizure activity following each; intubated for airway protection with rocuronium. Prolactin ~75   - CTH and CT cervical spine 12/2 negative for any acute pathologies. Tox screen on admission negative. 12/5: Stable exam from prior CT head, chronic microvascular ischemic changes, parenchymal loss, dystrophic calcification of central portion of alexis unchanged.  - Video EEG 12/3-12/4 without any seizure like activity, moderate to severe nonspecific diffuse or multifocal cerebral dysfunction  - c/w keppra 1500mg q12 maintenance, Keppra loaded 4.5g  - patient extubated again on 12/8th  -MRI noted, chronic CVA, no acute CVA noted     MRI noted , new findings for leptomeningeal disease, R/O sarcoid vs lymphoma   Hematology and rheum eval called  repeat scan per heme    LP   LE DVT study positive eliquis switched to heparin full AC for now         #CVAs/TIAs  - Hx of TIAs in 2011 and 2013, CVAs x3 in 02/22, 8/22, and 11/22 with residual expressive aphasia   - on Eliquis and Aspirin at home for hx of stroke  - PFO work up in past negative, no evidence of PFO on MIMI X2  - c/w ASA   - c/w heparin gtt, was on hold, resume eliquis   - Restart Statin when LFTs and CPK improve per neurology  - Being worked up for Mixed Connective Tissue Disease OP- f/u p-ANCA, c-ANCA/ [id number] negative  - Neuro recs appreciated  - repeat brain MRI noted   - LE pain, check MRI     # Hypernatremia  tredn Na level   renal eval appreciated  S/P hydraiton, trend Na level     #Thecal sac flattening   - CT thoracic and lumbar spine showing degenerative disc disease T6-T7 through L4-L5 and mild disc bulges at L2-L3 through L4-L5 that flatten the ventral thecal sac and narrowing of the bilateral neural foramina  - Will Monitor for now, will consider neuro/neurosurg evaluation in the future    #HLD  - atorvastatin on hold due to elevated CK and LFTs  - restart when possible for secondary stroke prevention    #?ASD/PFO  - Patient reportedly found to have a PFO in February 2022 during a stroke workup at [place]. Patient presented for a PFO closure in November 2022. Notably, no PFO was found at the time and no intervention was performed by Dr. Lynn.    - TTE 11/5/22 EF 57% and grossly normal LV function  - MIMI performed bedside 12/5 1 of 2 studies (+) on bubble study (uploaded to [facility])  - elevated D d-andrew, Check LE DVT study       #Rhabdomyolysis  - CK 8720 on admission, peaked at 15k, now downtrending  - DDx: prolonged downtime myositis v hyperthermia? (T 105.4 F) v sepsis v seizure induced    - L Ankle adn Knee pain, swelling  S/P Tap last week  cont to have pain and tenderness and swelling  Repeat imaging   Ortho follow up         # Elevated Winston level  CHeck Abd US noted    GI eval PRN   Trend LFTs , normalized, no need for MRCP       #Partial SBO - resolved  - CT chest, abdomen, and pelvis w/ contrast concerning for possible partial SBO without transition point.  - Surgery consulted, no acute intervention at this time  - 12/5 KUB - negative for ileus or SBO  - Hold TF for 12/7 for possible trial of extubation.  - otherwise diet per nutrition      #Leukocytosis  - worsening leukocytosis  - monitor for fever  - Ortho eval fro knee swelling   - Pan Cx  - LP by IR , follow up results         #Concern for sepsis  Unclear source, aspiration v less likely meningitis   BCx (12/2 NGTD repeat 12/5 NGTD) and UC 12/2 NGTD  - Patient initially presented with AMS, T 105.4, tachycardic, and tachypneic   - UA negative  - s/p vanco and zosyn x1 in ED 12/2, ceftriaxone 2g BID 12/3-12/4, vanco 12/3-12/4  - 12/7 DCed Vanc since BCx from 12/5 NGTD  - c/w zosyn for aspiration pna presumed. completed course, monitor   - ID eval appreciated  - febrile again, Ortho for knee asp      # ANemia  noted, no gross bleeding  type and screen  GI eval     Discussed with patient's family over the phone

## 2023-01-01 NOTE — PROGRESS NOTE ADULT - SUBJECTIVE AND OBJECTIVE BOX
Name of Patient : TAMI DUNHAM  MRN: [id number]  Date of visit: 01-01-23       Subjective: Patient seen and examined. No new events except as noted.   Doing okay     REVIEW OF SYSTEMS:    CONSTITUTIONAL: No weakness, fevers or chills  EYES/ENT: No visual changes;  No vertigo or throat pain   NECK: No pain or stiffness  RESPIRATORY: No cough, wheezing, hemoptysis; No shortness of breath  CARDIOVASCULAR: No chest pain or palpitations  GASTROINTESTINAL: No abdominal or epigastric pain. No nausea, vomiting, or hematemesis; No diarrhea or constipation. No melena or hematochezia.  GENITOURINARY: No dysuria, frequency or hematuria  NEUROLOGICAL: No numbness or weakness  SKIN: No itching, burning, rashes, or lesions   All other review of systems is negative unless indicated above.    MEDICATIONS:  MEDICATIONS  (STANDING):  aspirin enteric coated 81 milliGRAM(s) Oral daily  atorvastatin 40 milliGRAM(s) Oral at bedtime  bisacodyl Suppository 10 milliGRAM(s) Rectal daily  chlorhexidine 2% Cloths 1 Application(s) Topical daily  dextrose 50% Injectable 25 Gram(s) IV Push once  dextrose 50% Injectable 12.5 Gram(s) IV Push once  dextrose 50% Injectable 25 Gram(s) IV Push once  dextrose Oral Gel 15 Gram(s) Oral once  diphtheria/tetanus/pertussis (acellular) Vaccine (Adacel) 0.5 milliLiter(s) IntraMuscular once  glucagon  Injectable 1 milliGRAM(s) IntraMuscular once  heparin  Infusion.  Unit(s)/Hr (18 mL/Hr) IV Continuous <Continuous>  lacosamide IVPB 100 milliGRAM(s) IV Intermittent every 12 hours  levETIRAcetam  IVPB 1500 milliGRAM(s) IV Intermittent every 12 hours  lidocaine   4% Patch 1 Patch Transdermal every 24 hours  multivitamin 1 Tablet(s) Oral daily      PHYSICAL EXAM:  T(C): 36.7 (01-01-23 @ 21:11), Max: 36.8 (01-01-23 @ 04:17)  HR: 79 (01-01-23 @ 21:11) (75 - 81)  BP: 117/73 (01-01-23 @ 21:11) (110/80 - 125/72)  RR: 18 (01-01-23 @ 21:11) (16 - 18)  SpO2: 99% (01-01-23 @ 21:11) (98% - 99%)  Wt(kg): --  I&O's Summary    31 Dec 2022 07:01  -  01 Jan 2023 07:00  --------------------------------------------------------  IN: 294 mL / OUT: 1100 mL / NET: -806 mL          Appearance: Normal	  HEENT:  PERRLA   Lymphatic: No lymphadenopathy   Cardiovascular: Normal S1 S2, no JVD  Respiratory: normal effort , clear  Gastrointestinal:  Soft, Non-tender  Skin: No rashes,  warm to touch  Psychiatry:  Mood & affect appropriate  Musculuskeletal: No edema      All labs, Imaging and EKGs personally reviewed     12-31-22 @ 07:01  -  01-01-23 @ 07:00  --------------------------------------------------------  IN: 294 mL / OUT: 1100 mL / NET: -806 mL                          11.3   5.99  )-----------( 243      ( 01 Jan 2023 11:40 )             35.4               01-01    139  |  104  |  12  ----------------------------<  94  3.9   |  25  |  0.76    Ca    8.5      01 Jan 2023 04:45  Phos  4.2     01-01  Mg     2.10     01-01      PTT - ( 01 Jan 2023 18:55 )  PTT:107.6 sec

## 2023-01-01 NOTE — PROGRESS NOTE ADULT - SUBJECTIVE AND OBJECTIVE BOX
Neurology Progress Note    S: Patient seen and examined ,    +_ covid precuations     Medication:  MEDICATIONS  (STANDING):  aspirin enteric coated 81 milliGRAM(s) Oral daily  atorvastatin 40 milliGRAM(s) Oral at bedtime  bisacodyl Suppository 10 milliGRAM(s) Rectal daily  chlorhexidine 2% Cloths 1 Application(s) Topical daily  dextrose 50% Injectable 25 Gram(s) IV Push once  dextrose 50% Injectable 12.5 Gram(s) IV Push once  dextrose 50% Injectable 25 Gram(s) IV Push once  dextrose Oral Gel 15 Gram(s) Oral once  diphtheria/tetanus/pertussis (acellular) Vaccine (Adacel) 0.5 milliLiter(s) IntraMuscular once  glucagon  Injectable 1 milliGRAM(s) IntraMuscular once  heparin  Infusion.  Unit(s)/Hr (18 mL/Hr) IV Continuous <Continuous>  lacosamide IVPB 100 milliGRAM(s) IV Intermittent every 12 hours  levETIRAcetam  IVPB 1500 milliGRAM(s) IV Intermittent every 12 hours  lidocaine   4% Patch 1 Patch Transdermal every 24 hours  multivitamin 1 Tablet(s) Oral daily    MEDICATIONS  (PRN):  heparin   Injectable 4000 Unit(s) IV Push every 6 hours PRN For aPTT between 40 - 57  heparin   Injectable 8000 Unit(s) IV Push every 6 hours PRN For aPTT less than 40  oxyCODONE    IR 5 milliGRAM(s) Oral every 6 hours PRN moderate and severe pain  phenazopyridine 100 milliGRAM(s) Oral every 8 hours PRN dysuria  traMADol 25 milliGRAM(s) Oral every 6 hours PRN Moderate Pain (4 - 6)       Vitals:  Vital Signs Last 24 Hrs  T(C): 36.8 (01-01-23 @ 11:13), Max: 36.9 (12-31-22 @ 21:19)  T(F): 98.3 (01-01-23 @ 11:13), Max: 98.5 (12-31-22 @ 21:19)  HR: 81 (01-01-23 @ 11:13) (76 - 81)  BP: 125/72 (01-01-23 @ 11:13) (118/79 - 125/72)  BP(mean): --  RR: 18 (01-01-23 @ 11:13) (16 - 18)  SpO2: 99% (01-01-23 @ 11:13) (98% - 99%)          General Exam:   General Appearance: Appropriately dressed and in no acute distress       Head: Normocephalic, atraumatic and no dysmorphic features  Ear, Nose, and Throat: Moist mucous membranes    CVS: S1S2+  Resp: No SOB, no wheeze or rhonchi  Abd: soft NTND  Extremities: No edema, no cyanosis  Skin: No bruises, no rashes     Neurological Exam:    Mental Status: Awake, Oriented x 2-3, able to follow simple and complex verbal commands. answers questions appropriately, able to name, repeat and recall. no aphasia, mild dysarthria (tongue bite)   Cranial Nerves: EOMI, PERRLA, VFF, V1-V3 sensation intact, no facial asymmetry, tongue midline, hearing intact B/L, shoulder shrug appropriate, SCM/trap intact.   Motor: Moving uppers > lowers. uppers at least 4+-5/5 ; lowers R>L 3-4/5   Sensation: intact to LT and PP   Reflexes: 1+ throughout at biceps, brachioradialis, triceps, patellars and ankles bilaterally and equal. No clonus. R toe and L toe were both downgoing.  Coordination: no dysmetria   Gait: unable     I personally reviewed the below data/images/labs:  CBC Full  -  ( 01 Jan 2023 04:45 )  WBC Count : 7.64 K/uL  RBC Count : 3.86 M/uL  Hemoglobin : 11.4 g/dL  Hematocrit : 35.2 %  Platelet Count - Automated : 267 K/uL  Mean Cell Volume : 91.2 fL  Mean Cell Hemoglobin : 29.5 pg  Mean Cell Hemoglobin Concentration : 32.4 gm/dL  Auto Neutrophil # : x  Auto Lymphocyte # : x  Auto Monocyte # : x  Auto Eosinophil # : x  Auto Basophil # : x  Auto Neutrophil % : x  Auto Lymphocyte % : x  Auto Monocyte % : x  Auto Eosinophil % : x  Auto Basophil % : x    01-01    139  |  104  |  12  ----------------------------<  94  3.9   |  25  |  0.76    Ca    8.5      01 Jan 2023 04:45  Phos  4.2     01-01  Mg     2.10     01-01        < from: CT Head No Cont (12.02.22 @ 20:27) >    ACC: [id number] EXAM:  CT CERVICAL SPINE                        ACC: [id number] EXAM:  CT MAXILLOFACIAL                        ACC: [id number] EXAM:  CT BRAIN                          PROCEDURE DATE:  12/02/2022        INTERPRETATION:  CLINICAL INDICATION: Trauma.    Technique: Noncontrast axial CT of the head, facial bones, and cervical   spine was performed. 3-D reformats of the facial bones was obtained.   Coronal and sagittal reformats were obtained.      COMPARISON: None.    FINDINGS:  Head CT:  The ventricles and sulci are within normal limits. 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 There is no intraparenchymal hematoma, mass effect   or midline shift. No abnormal extra-axial fluid collections or   hemorrhages are present.    There is swelling of the left lateral scalp. The calvarium is intact.   There are scattered mucosal inflammatory changes in the paranasal sinuses.      Cervical spine CT:  Alignment ismaintained. Vertebral bodies are normal in height, without   evidence of fracture or dislocation. Prevertebral soft tissues are within   normal limits without soft tissue swelling or hematoma.    Intervertebral discs are intact. Neural foramina and spinal canal are   intact.    The visualized lung apices are within normal limits.      Facial bone CT:    No acute facial fracture.    There are scattered mucosal inflammatory changes in the paranasal   sinuses. Mastoid air cells are clear.    Soft tissues appear unremarkable.        IMPRESSION:  CT HEAD: No acute abnormality.  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  CT CERVICAL SPINE: No acute abnormality  CT FACIAL BONE: No acute abnormality    --- End of Report ---       DELGADO IVAN MD; Attending Radiologist  This document has been electronically signed. Dec  2 2022  9:02PM    < end of copied text >  < from: CT Lumbar Spine No Cont (12.02.22 @ 20:27) >    ACC: [id number] EXAM:  CT LUMBAR SPINE                        ACC: [id number] EXAM:  CT THORACIC SPINE                          PROCEDURE DATE:  12/02/2022          INTERPRETATION:  CT thoracic and lumbar spine without IV contrast    CLINICAL INFORMATION:  Trauma  Back pain, fracture.    TECHNIQUE:  Contiguous axial 2 mm sections were obtained through the   thoracic and lumbar spine using a single helical acquisition.     Additional 2 mm sagittal and coronal reconstructions of the spine were   obtained. These additional reformatted images were used to evaluate the   spine for alignment, vertebral fractures and the integrity of the the   posterior elements.   This scan was performed using automatic exposure   control (radiation dose reduction software) to obtain a diagnostic image   quality scan with patient dose as low as reasonably achievable.    FINDINGS:   No prior similar studies are available for review    Thoracic and lumbar vertebral body heights are maintained. No vertebral   fracture is seen. No destructive bone lesion is found.  Alignment is   preserved.  Facet joints appear intact and aligned.    Thoracic and lumbar intervertebral disc spaces appear intact.   Degenerative disc disease and spondylosis is noted at T6-T7 throughL4-5   with loss of disc height and associated degenerative endplate changes.   Mild disc bulges at L2-3 through L4-5 flatten the ventral thecal sac and   narrow the BILATERAL neural foramina.    No paraspinal mass is recognized.  Paraspinal soft tissues appear intact.      IMPRESSION:  Degenerative disc disease and spondylosis is noted at T6-T7   through L4-5 with loss of disc height and associated degenerative   endplate changes. Mild disc bulges at L2-3 through L4-5 flatten the   ventral thecal sac and narrow the BILATERAL neural foramina   No   vertebral fracture is recognized.    --- End of Report ---       ANGIE ESCOBAR MD; Attending Radiologist  This document has been electronically signed. Dec  2 2022  8:55PM    < end of copied text >    EEG Classification / Summary:  Abnormal EEG in a comatose patient due to diffuse suppression gradually improving to discontinuous background, with right hemispheric relative attenuation/suppression. No epileptiform abnormalities or seizures are captured.    Clinical Impression:  Severe diffuse cerebral dysfunction that gradually improves is nonspecific in etiology. In this case, it may be related to sedating medication (propofol) with improvement after decreasing and stopping the medication.  Right hemispheric focal cerebral dysfunction can be structural or functional in etiology.      12/7  Clinical Impression:  -Occasional runs of right frontal lateralized periodic discharges (LPDs) at up to 1.3 Hz indicate a potentially epileptogenic focus in the right frontal region and are on the ictal-interictal continuum.  -A pattern on the ictal-interictal continuum is a pattern that does not qualify as an electrographic seizure or electrographic status epilepticus, but there is a reasonable chance that it may be contributing to impaired alertness, causing other clinical symptoms, and/or contributing to neuronal injury.  -Right hemispheric relative attenuation indicates right hemispheric focal cerebral dysfunction, which can be structural or functional in etiology.  -Rare left frontal epileptiform discharges indicate a potentially epileptogenic focus in this reigon  -Severe diffuse slowing and GRDA indicate severe diffuse cerebral dysfunction of nonspecific etiology.  -No electrographic seizures are captured.     < from: MR Head w/wo IV Cont (12.09.22 @ 19:54) >    ACC: [id number] EXAM:  MR BRAIN WAW IC                          PROCEDURE DATE:  12/09/2022          INTERPRETATION:  CLINICAL INDICATION: CVA, found unresponsive at home      Magnetic resonance imaging of the brain was carried out with transaxial   SPGR, FLAIR, fast spin echo T2 weighted images, axial susceptibility   weighted series, diffusion weighted series and sagittal T1 weighted   series on a 1.5 Maritza magnet. Post contrast axial, coronal and sagittal   T1 weighted images were obtained. 10cc of Gadavist were intravenously   injected, 0 cc were discarded.      Comparison is made with the prior brain CT of 12/5/2022 and MRI 11/5/2022.    Mild ventricular and sulcal prominence is consistent with moderate   atrophy for the patient's age. No acute hemorrhage is identified. There   is a focus of hemosiderin within the alexis which is calcified on CT.   Scattered additional foci of hemosiderin deposition are identified in the   left frontal subcortical white matter and right occipital cortex is   nonspecific but may be related to multiple cavernoma is or hypertension.    There is a tiny focus of diffusion restriction in the right frontal   subcortical white matter and right centrum semiovale which are unchanged   since the prior exam and may represent subacute infarcts. Multiplicity   infarcts may be related to hypercoagulable state or cardioembolic events.    After contrast administration there is normal intracranial vascular   enhancement. No abnormal parenchymal or leptomeningeal enhancement.      IMPRESSION: Atrophy for the patient's age. Right frontal subcortical and   right centrum semiovale punctate infarcts are unchanged since 11/5/2022   and likely represent subacute infarcts. No abnormal enhancement. Focus of   calcification in the alexis with old hemosiderin. A few additional   scattered foci of hemosiderin deposition are unchanged.    --- End of Report ---     < from: MR Head w/wo IV Cont (12.29.22 @ 13:39) >    ACC: [id number] EXAM:  MR BRAIN WAW IC                          PROCEDURE DATE:  12/29/2022          INTERPRETATION:  CLINICAL INFORMATION: Follow-up study for infarct seen   on prior MR 12/9/2022. Patient initially presented to the hospital after   being found unresponsive.    TECHNIQUE: MRI of the brain was performed with and without contrast.   Sagittal and axial T1, axial T2, FLAIR, SWI, diffusion-weighted images   and an ADC map were obtained.    9.5 cc of Gadavist was injected, with 0.5 cc discarded.    COMPARISON: MR brain 12/9/2022    FINDINGS:    Previously seen foci of diffusion restriction in the right frontal   subcortical white matter and right centrum semiovale no longer restricted   diffusion and represent chronic infarcts. No new areas of infarction are   identified.    Foci of susceptibility artifact in the right occipital and right parietal   lobe, unchanged. No acute intracranial hemorrhage. No mass effect or   midline shift.    Areas of contrast enhancement in the bilateral putamen (12-18).   Enhancement of the alexis is also noted.    Moderate prominence of the sulci and ventricles..    There are foci of T2/FLAIR signal hyperintensity within the hemispheric   white matter, which are nonspecific but likely related tosequelae of   microvascular disease.    No abnormal extra-axial fluid collections are present.    Major flow-voids at the base of the brain follow expected course and   contour.    The calvarium is intact. Right ethmoid sinus mucosal thickening.    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 No new areas of infarct are identified. Unchanged scattered   foci of hemosiderin.    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   Differential diagnosis includes infection, sarcoidosis, lymphoma and   CLIPPERS (chronic lymphocytic inflammation with pontine perivascular   enhancement responsive to steroids).    --- End of Report ---         CARMEN KONG MD; Resident Radiologist  This document has been electronically signed.  TANVIR LUNA MD; Attending Radiologist  This document has been electronically signed. Dec 29 2022  5:08PM    < end of copied text >

## 2023-01-02 LAB
ANION GAP SERPL CALC-SCNC: 8 MMOL/L — SIGNIFICANT CHANGE UP (ref 7–14)
APTT BLD: 105.3 SEC — HIGH (ref 27–36.3)
APTT BLD: 53.4 SEC — HIGH (ref 27–36.3)
APTT BLD: 57.8 SEC — HIGH (ref 27–36.3)
BUN SERPL-MCNC: 12 MG/DL — SIGNIFICANT CHANGE UP (ref 7–23)
CALCIUM SERPL-MCNC: 8.5 MG/DL — SIGNIFICANT CHANGE UP (ref 8.4–10.5)
CHLORIDE SERPL-SCNC: 104 MMOL/L — SIGNIFICANT CHANGE UP (ref 98–107)
CO2 SERPL-SCNC: 26 MMOL/L — SIGNIFICANT CHANGE UP (ref 22–31)
CREAT SERPL-MCNC: 0.71 MG/DL — SIGNIFICANT CHANGE UP (ref 0.5–1.3)
EGFR: 110 ML/MIN/1.73M2 — SIGNIFICANT CHANGE UP
GLUCOSE SERPL-MCNC: 100 MG/DL — HIGH (ref 70–99)
HCT VFR BLD CALC: 34.2 % — LOW (ref 39–50)
HGB BLD-MCNC: 11.2 G/DL — LOW (ref 13–17)
MAGNESIUM SERPL-MCNC: 1.9 MG/DL — SIGNIFICANT CHANGE UP (ref 1.6–2.6)
MCHC RBC-ENTMCNC: 29.9 PG — SIGNIFICANT CHANGE UP (ref 27–34)
MCHC RBC-ENTMCNC: 32.7 GM/DL — SIGNIFICANT CHANGE UP (ref 32–36)
MCV RBC AUTO: 91.2 FL — SIGNIFICANT CHANGE UP (ref 80–100)
NRBC # BLD: 0 /100 WBCS — SIGNIFICANT CHANGE UP (ref 0–0)
NRBC # FLD: 0 K/UL — SIGNIFICANT CHANGE UP (ref 0–0)
PHOSPHATE SERPL-MCNC: 4 MG/DL — SIGNIFICANT CHANGE UP (ref 2.5–4.5)
PLATELET # BLD AUTO: 220 K/UL — SIGNIFICANT CHANGE UP (ref 150–400)
POTASSIUM SERPL-MCNC: 3.8 MMOL/L — SIGNIFICANT CHANGE UP (ref 3.5–5.3)
POTASSIUM SERPL-SCNC: 3.8 MMOL/L — SIGNIFICANT CHANGE UP (ref 3.5–5.3)
RBC # BLD: 3.75 M/UL — LOW (ref 4.2–5.8)
RBC # FLD: 14.2 % — SIGNIFICANT CHANGE UP (ref 10.3–14.5)
SODIUM SERPL-SCNC: 138 MMOL/L — SIGNIFICANT CHANGE UP (ref 135–145)
WBC # BLD: 5.84 K/UL — SIGNIFICANT CHANGE UP (ref 3.8–10.5)
WBC # FLD AUTO: 5.84 K/UL — SIGNIFICANT CHANGE UP (ref 3.8–10.5)

## 2023-01-02 RX ADMIN — HEPARIN SODIUM 4000 UNIT(S): 5000 INJECTION INTRAVENOUS; SUBCUTANEOUS at 19:56

## 2023-01-02 RX ADMIN — Medication 10 MILLIGRAM(S): at 12:58

## 2023-01-02 RX ADMIN — HEPARIN SODIUM 1400 UNIT(S)/HR: 5000 INJECTION INTRAVENOUS; SUBCUTANEOUS at 12:55

## 2023-01-02 RX ADMIN — HEPARIN SODIUM 1600 UNIT(S)/HR: 5000 INJECTION INTRAVENOUS; SUBCUTANEOUS at 19:45

## 2023-01-02 RX ADMIN — LACOSAMIDE 120 MILLIGRAM(S): 50 TABLET ORAL at 05:35

## 2023-01-02 RX ADMIN — Medication 81 MILLIGRAM(S): at 12:59

## 2023-01-02 RX ADMIN — LACOSAMIDE 120 MILLIGRAM(S): 50 TABLET ORAL at 18:59

## 2023-01-02 RX ADMIN — LIDOCAINE 1 PATCH: 4 CREAM TOPICAL at 18:09

## 2023-01-02 RX ADMIN — CHLORHEXIDINE GLUCONATE 1 APPLICATION(S): 213 SOLUTION TOPICAL at 12:56

## 2023-01-02 RX ADMIN — LEVETIRACETAM 400 MILLIGRAM(S): 250 TABLET, FILM COATED ORAL at 05:09

## 2023-01-02 RX ADMIN — ATORVASTATIN CALCIUM 40 MILLIGRAM(S): 80 TABLET, FILM COATED ORAL at 21:45

## 2023-01-02 RX ADMIN — HEPARIN SODIUM 1600 UNIT(S)/HR: 5000 INJECTION INTRAVENOUS; SUBCUTANEOUS at 19:42

## 2023-01-02 RX ADMIN — LEVETIRACETAM 400 MILLIGRAM(S): 250 TABLET, FILM COATED ORAL at 18:08

## 2023-01-02 RX ADMIN — OXYCODONE HYDROCHLORIDE 5 MILLIGRAM(S): 5 TABLET ORAL at 05:34

## 2023-01-02 RX ADMIN — HEPARIN SODIUM 1400 UNIT(S)/HR: 5000 INJECTION INTRAVENOUS; SUBCUTANEOUS at 07:47

## 2023-01-02 RX ADMIN — HEPARIN SODIUM 1400 UNIT(S)/HR: 5000 INJECTION INTRAVENOUS; SUBCUTANEOUS at 03:13

## 2023-01-02 RX ADMIN — Medication 1 TABLET(S): at 12:59

## 2023-01-02 RX ADMIN — OXYCODONE HYDROCHLORIDE 5 MILLIGRAM(S): 5 TABLET ORAL at 06:35

## 2023-01-02 RX ADMIN — OXYCODONE HYDROCHLORIDE 5 MILLIGRAM(S): 5 TABLET ORAL at 18:59

## 2023-01-02 NOTE — PROGRESS NOTE ADULT - PROBLEM SELECTOR PLAN 4
Hx of TIAs in 2011 and 2013, CVAs x3 in 02/22, 8/22, and 11/22 with residual expressive aphasia    - on ASA/Eliquis Hx of TIAs in 2011 and 2013, CVAs x3 in 02/22, 8/22, and 11/22 with residual expressive aphasia    - on ASA/Eliquis (eliquis on hold)  on hep gtt

## 2023-01-02 NOTE — PROGRESS NOTE ADULT - SUBJECTIVE AND OBJECTIVE BOX
Subjective: Patient seen and examined. No new events except as noted.     REVIEW OF SYSTEMS:    CONSTITUTIONAL: + weakness, fevers or chills  EYES/ENT: No visual changes;  No vertigo or throat pain   NECK: No pain or stiffness  RESPIRATORY: No cough, wheezing, hemoptysis; No shortness of breath  CARDIOVASCULAR: No chest pain or palpitations  GASTROINTESTINAL: No abdominal or epigastric pain. No nausea, vomiting, or hematemesis; No diarrhea or constipation. No melena or hematochezia.  GENITOURINARY: No dysuria, frequency or hematuria  NEUROLOGICAL: No numbness or weakness  SKIN: No itching, burning, rashes, or lesions   All other review of systems is negative unless indicated above.    MEDICATIONS:  MEDICATIONS  (STANDING):  aspirin enteric coated 81 milliGRAM(s) Oral daily  atorvastatin 40 milliGRAM(s) Oral at bedtime  bisacodyl Suppository 10 milliGRAM(s) Rectal daily  chlorhexidine 2% Cloths 1 Application(s) Topical daily  dextrose 50% Injectable 25 Gram(s) IV Push once  dextrose 50% Injectable 12.5 Gram(s) IV Push once  dextrose 50% Injectable 25 Gram(s) IV Push once  dextrose Oral Gel 15 Gram(s) Oral once  diphtheria/tetanus/pertussis (acellular) Vaccine (Adacel) 0.5 milliLiter(s) IntraMuscular once  glucagon  Injectable 1 milliGRAM(s) IntraMuscular once  heparin  Infusion.  Unit(s)/Hr (18 mL/Hr) IV Continuous <Continuous>  lacosamide IVPB 100 milliGRAM(s) IV Intermittent every 12 hours  levETIRAcetam  IVPB 1500 milliGRAM(s) IV Intermittent every 12 hours  lidocaine   4% Patch 1 Patch Transdermal every 24 hours  multivitamin 1 Tablet(s) Oral daily    PHYSICAL EXAM:  Vital Signs Last 24 Hrs  T(C): 36.9 (02 Jan 2023 11:36), Max: 36.9 (02 Jan 2023 11:36)  T(F): 98.4 (02 Jan 2023 11:36), Max: 98.4 (02 Jan 2023 11:36)  HR: 77 (02 Jan 2023 11:36) (75 - 79)  BP: 117/74 (02 Jan 2023 11:36) (110/80 - 117/75)  BP(mean): --  RR: 18 (02 Jan 2023 11:36) (17 - 18)  SpO2: 97% (02 Jan 2023 11:36) (97% - 99%)    Parameters below as of 02 Jan 2023 11:36  Patient On (Oxygen Delivery Method): room air    I&O's Summary    Appearance: Normal	  HEENT: Normal oral mucosa, PERRL, EOMI	  Lymphatic: No lymphadenopathy , no edema  Cardiovascular: Normal S1 S2, No JVD, No murmurs , Peripheral pulses palpable 2+ bilaterally  Respiratory: Lungs clear to auscultation, normal effort 	  Gastrointestinal:  Soft, Non-tender, + BS	  Skin: No rashes, No ecchymoses, No cyanosis, warm to touch  Musculoskeletal: Normal range of motion, normal strength  Psychiatry: Mood & affect appropriate  Ext: No edema    LABS:    CARDIAC MARKERS:                        11.2   5.84  )-----------( 220      ( 02 Jan 2023 06:30 )             34.2     01-02    138  |  104  |  12  ----------------------------<  100<H>  3.8   |  26  |  0.71    Ca    8.5      02 Jan 2023 06:30  Phos  4.0     01-02  Mg     1.90     01-02    proBNP:   Lipid Profile:   HgA1c:   TSH:     TELEMETRY: 	    ECG:  	  RADIOLOGY:   DIAGNOSTIC TESTING:  [ ] Echocardiogram:  [ ]  Catheterization:  [ ] Stress Test:    OTHER:

## 2023-01-02 NOTE — PROGRESS NOTE ADULT - ASSESSMENT
52-year-old male with a PMHx significant for TIAs (2011, 2013), CVAs x3 (02/2022 s/p tPA, 8/3/2022 s/p tPA, 11/5/2022 with residual expressive aphasia) on Eliquis, and HLD BIBEMS after being found unresponsive at home on the floor, last known well 12/1 at around noon. C-collar was placed. Patient was intubated in the ED for airway protection. CTH with no acute findings, vEEG with no identified seizures. Patient was weaned off pressors, extubated, and transitioned to floors 12/4/22. 12/5 am RRT called for seizure like activity with urinary incontinence, tongue biting, and R>L posturing, patient s/p ativan 2mg x3. Anesthesia called to RRT for intubation. MICU accepting patient for seizure like activity requiring intubation.      #AMS, Seizure like activity  EEG 12/7: concern for epileptiform activity; though EEG from 12/5 without such abnormalities.   - f/u neuro recs  - Patient found down and unresponsive at home on 12/2, last known well 12/1 at around noon. Intubated in ED 12/2, extubated in MICU 12/4  - RRT 12/5: seizure like acting with urinary incontinence, convulsions, and tongue biting; s/p ativan 2mg x3 with pauses in seizure activity following each; intubated for airway protection with rocuronium. Prolactin ~75   - CTH and CT cervical spine 12/2 negative for any acute pathologies. Tox screen on admission negative. 12/5: Stable exam from prior CT head, chronic microvascular ischemic changes, parenchymal loss, dystrophic calcification of central portion of alexis unchanged.  - Video EEG 12/3-12/4 without any seizure like activity, moderate to severe nonspecific diffuse or multifocal cerebral dysfunction  - c/w keppra 1500mg q12 maintenance, Keppra loaded 4.5g  - patient extubated again on 12/8th  -MRI noted, chronic CVA, no acute CVA noted     MRI noted , new findings for leptomeningeal disease, R/O sarcoid vs lymphoma   Hematology and rheum eval called  repeat scan per heme    LP   LE DVT study positive eliquis switched to heparin full AC for now         #CVAs/TIAs  - Hx of TIAs in 2011 and 2013, CVAs x3 in 02/22, 8/22, and 11/22 with residual expressive aphasia   - on Eliquis and Aspirin at home for hx of stroke  - PFO work up in past negative, no evidence of PFO on MIMI X2  - c/w ASA   - c/w heparin gtt, was on hold, resume eliquis   - Restart Statin when LFTs and CPK improve per neurology  - Being worked up for Mixed Connective Tissue Disease OP- f/u p-ANCA, c-ANCA/ Ubvq3mghaludeyrva negative  - Neuro recs appreciated  - repeat brain MRI noted   - LE pain, check MRI     # Hypernatremia  tredn Na level   renal eval appreciated  S/P hydraiton, trend Na level     #Thecal sac flattening   - CT thoracic and lumbar spine showing degenerative disc disease T6-T7 through L4-L5 and mild disc bulges at L2-L3 through L4-L5 that flatten the ventral thecal sac and narrowing of the bilateral neural foramina  - Will Monitor for now, will consider neuro/neurosurg evaluation in the future    #HLD  - atorvastatin on hold due to elevated CK and LFTs  - restart when possible for secondary stroke prevention    #?ASD/PFO  - Patient reportedly found to have a PFO in February 2022 during a stroke workup at [facility]. Patient presented for a PFO closure in November 2022. Notably, no PFO was found at the time and no intervention was performed by Dr. Lynn.    - TTE 11/5/22 EF 57% and grossly normal LV function  - MIMI performed bedside 12/5 1 of 2 studies (+) on bubble study (uploaded to [facility])  - elevated D d-andrew, Check LE DVT study       #Rhabdomyolysis  - CK 8720 on admission, peaked at 15k, now downtrending  - DDx: prolonged downtime myositis v hyperthermia? (T 105.4 F) v sepsis v seizure induced    - L Ankle adn Knee pain, swelling  S/P Tap last week  cont to have pain and tenderness and swelling  Repeat imaging   Ortho follow up         # Elevated Winston level  CHeck Abd US noted    GI eval PRN   Trend LFTs , normalized, no need for MRCP       #Partial SBO - resolved  - CT chest, abdomen, and pelvis w/ contrast concerning for possible partial SBO without transition point.  - Surgery consulted, no acute intervention at this time  - 12/5 KUB - negative for ileus or SBO  - Hold TF for 12/7 for possible trial of extubation.  - otherwise diet per nutrition      #Leukocytosis  - worsening leukocytosis  - monitor for fever  - Ortho eval fro knee swelling   - Pan Cx  - LP by IR , follow up results         #Concern for sepsis  Unclear source, aspiration v less likely meningitis   BCx (12/2 NGTD repeat 12/5 NGTD) and UC 12/2 NGTD  - Patient initially presented with AMS, T 105.4, tachycardic, and tachypneic   - UA negative  - s/p vanco and zosyn x1 in ED 12/2, ceftriaxone 2g BID 12/3-12/4, vanco 12/3-12/4  - 12/7 DCed Vanc since BCx from 12/5 NGTD  - c/w zosyn for aspiration pna presumed. completed course, monitor   - ID eval appreciated  - febrile again, Ortho for knee asp      # ANemia  noted, no gross bleeding  type and screen  GI eval     Discussed with patient's family over the phone

## 2023-01-03 LAB
ANION GAP SERPL CALC-SCNC: 10 MMOL/L — SIGNIFICANT CHANGE UP (ref 7–14)
APTT BLD: 127.1 SEC — CRITICAL HIGH (ref 27–36.3)
APTT BLD: 136.3 SEC — CRITICAL HIGH (ref 27–36.3)
APTT BLD: 65.8 SEC — HIGH (ref 27–36.3)
APTT BLD: 74.5 SEC — HIGH (ref 27–36.3)
BUN SERPL-MCNC: 10 MG/DL — SIGNIFICANT CHANGE UP (ref 7–23)
CALCIUM SERPL-MCNC: 8.4 MG/DL — SIGNIFICANT CHANGE UP (ref 8.4–10.5)
CCP IGG SERPL-ACNC: <8 UNITS — SIGNIFICANT CHANGE UP
CHLORIDE SERPL-SCNC: 105 MMOL/L — SIGNIFICANT CHANGE UP (ref 98–107)
CO2 SERPL-SCNC: 26 MMOL/L — SIGNIFICANT CHANGE UP (ref 22–31)
CREAT SERPL-MCNC: 0.81 MG/DL — SIGNIFICANT CHANGE UP (ref 0.5–1.3)
EGFR: 106 ML/MIN/1.73M2 — SIGNIFICANT CHANGE UP
GAMMA INTERFERON BACKGROUND BLD IA-ACNC: 0.03 IU/ML — SIGNIFICANT CHANGE UP
GLUCOSE SERPL-MCNC: 94 MG/DL — SIGNIFICANT CHANGE UP (ref 70–99)
HCT VFR BLD CALC: 35 % — LOW (ref 39–50)
HGB BLD-MCNC: 11.3 G/DL — LOW (ref 13–17)
M TB IFN-G BLD-IMP: NEGATIVE — SIGNIFICANT CHANGE UP
M TB IFN-G CD4+ BCKGRND COR BLD-ACNC: 0 IU/ML — SIGNIFICANT CHANGE UP
M TB IFN-G CD4+CD8+ BCKGRND COR BLD-ACNC: 0 IU/ML — SIGNIFICANT CHANGE UP
MAGNESIUM SERPL-MCNC: 1.9 MG/DL — SIGNIFICANT CHANGE UP (ref 1.6–2.6)
MCHC RBC-ENTMCNC: 29.9 PG — SIGNIFICANT CHANGE UP (ref 27–34)
MCHC RBC-ENTMCNC: 32.3 GM/DL — SIGNIFICANT CHANGE UP (ref 32–36)
MCV RBC AUTO: 92.6 FL — SIGNIFICANT CHANGE UP (ref 80–100)
MYELOPEROXIDASE AB SER-ACNC: <5 UNITS — SIGNIFICANT CHANGE UP
MYELOPEROXIDASE CELLS FLD QL: NEGATIVE — SIGNIFICANT CHANGE UP
NRBC # BLD: 0 /100 WBCS — SIGNIFICANT CHANGE UP (ref 0–0)
NRBC # FLD: 0 K/UL — SIGNIFICANT CHANGE UP (ref 0–0)
PHOSPHATE SERPL-MCNC: 4.2 MG/DL — SIGNIFICANT CHANGE UP (ref 2.5–4.5)
PLATELET # BLD AUTO: 206 K/UL — SIGNIFICANT CHANGE UP (ref 150–400)
POTASSIUM SERPL-MCNC: 4.1 MMOL/L — SIGNIFICANT CHANGE UP (ref 3.5–5.3)
POTASSIUM SERPL-SCNC: 4.1 MMOL/L — SIGNIFICANT CHANGE UP (ref 3.5–5.3)
PROTEINASE3 AB FLD-ACNC: <5 UNITS — SIGNIFICANT CHANGE UP
PROTEINASE3 AB SER-ACNC: NEGATIVE — SIGNIFICANT CHANGE UP
QUANT TB PLUS MITOGEN MINUS NIL: 8.83 IU/ML — SIGNIFICANT CHANGE UP
RBC # BLD: 3.78 M/UL — LOW (ref 4.2–5.8)
RBC # FLD: 14.2 % — SIGNIFICANT CHANGE UP (ref 10.3–14.5)
RF+CCP IGG SER-IMP: NEGATIVE — SIGNIFICANT CHANGE UP
SODIUM SERPL-SCNC: 141 MMOL/L — SIGNIFICANT CHANGE UP (ref 135–145)
WBC # BLD: 5.81 K/UL — SIGNIFICANT CHANGE UP (ref 3.8–10.5)
WBC # FLD AUTO: 5.81 K/UL — SIGNIFICANT CHANGE UP (ref 3.8–10.5)

## 2023-01-03 PROCEDURE — 99233 SBSQ HOSP IP/OBS HIGH 50: CPT | Mod: GC

## 2023-01-03 PROCEDURE — 73721 MRI JNT OF LWR EXTRE W/O DYE: CPT | Mod: 26,LT

## 2023-01-03 PROCEDURE — 72158 MRI LUMBAR SPINE W/O & W/DYE: CPT | Mod: 26

## 2023-01-03 RX ADMIN — ATORVASTATIN CALCIUM 40 MILLIGRAM(S): 80 TABLET, FILM COATED ORAL at 21:59

## 2023-01-03 RX ADMIN — HEPARIN SODIUM 1400 UNIT(S)/HR: 5000 INJECTION INTRAVENOUS; SUBCUTANEOUS at 19:02

## 2023-01-03 RX ADMIN — LIDOCAINE 1 PATCH: 4 CREAM TOPICAL at 18:15

## 2023-01-03 RX ADMIN — CHLORHEXIDINE GLUCONATE 1 APPLICATION(S): 213 SOLUTION TOPICAL at 12:06

## 2023-01-03 RX ADMIN — LIDOCAINE 1 PATCH: 4 CREAM TOPICAL at 18:50

## 2023-01-03 RX ADMIN — LACOSAMIDE 120 MILLIGRAM(S): 50 TABLET ORAL at 17:30

## 2023-01-03 RX ADMIN — Medication 10 MILLIGRAM(S): at 12:06

## 2023-01-03 RX ADMIN — LIDOCAINE 1 PATCH: 4 CREAM TOPICAL at 07:00

## 2023-01-03 RX ADMIN — LEVETIRACETAM 400 MILLIGRAM(S): 250 TABLET, FILM COATED ORAL at 05:46

## 2023-01-03 RX ADMIN — HEPARIN SODIUM 1400 UNIT(S)/HR: 5000 INJECTION INTRAVENOUS; SUBCUTANEOUS at 07:31

## 2023-01-03 RX ADMIN — HEPARIN SODIUM 1400 UNIT(S)/HR: 5000 INJECTION INTRAVENOUS; SUBCUTANEOUS at 19:13

## 2023-01-03 RX ADMIN — OXYCODONE HYDROCHLORIDE 5 MILLIGRAM(S): 5 TABLET ORAL at 17:12

## 2023-01-03 RX ADMIN — HEPARIN SODIUM 1400 UNIT(S)/HR: 5000 INJECTION INTRAVENOUS; SUBCUTANEOUS at 12:04

## 2023-01-03 RX ADMIN — LEVETIRACETAM 400 MILLIGRAM(S): 250 TABLET, FILM COATED ORAL at 18:11

## 2023-01-03 RX ADMIN — HEPARIN SODIUM 1400 UNIT(S)/HR: 5000 INJECTION INTRAVENOUS; SUBCUTANEOUS at 03:11

## 2023-01-03 RX ADMIN — LACOSAMIDE 120 MILLIGRAM(S): 50 TABLET ORAL at 05:48

## 2023-01-03 RX ADMIN — Medication 1 TABLET(S): at 12:06

## 2023-01-03 RX ADMIN — OXYCODONE HYDROCHLORIDE 5 MILLIGRAM(S): 5 TABLET ORAL at 16:12

## 2023-01-03 RX ADMIN — OXYCODONE HYDROCHLORIDE 5 MILLIGRAM(S): 5 TABLET ORAL at 06:06

## 2023-01-03 RX ADMIN — Medication 81 MILLIGRAM(S): at 12:07

## 2023-01-03 NOTE — PROGRESS NOTE ADULT - ASSESSMENT
52-year-old  male presented with PMHx of TIAs (2011, 2013), CVAs x3 (2/2022, 8/3/2022, 11/5/2022) w/residual expressive aphasia on Eliquis, dyslipidemia and questionable ASD/PFO admitted after patient was found unresponsive at home on 12/2/2022. Currently mental status and fever improving. MRI showed bilateral lentiform nucleus and pontine enhancement need to R/O sarcoidosis. Rheumatology consult for further evaluation    #Leptomeningeal enhancement   -Unexplained AMS w/seizure  -CSF ACE negative, and paraneoplastic negative  -Serum ACE negative and normal vitamin D25  -MRI showed bilateral lentiform nucleus and pontine enhancement   -CT chest no sign of mediastinal lymph node  -differentials: sarcoidosis, IgG4-RD, lymphoma, lupus cerebritis  and CLIPPERS    #Left foot drop  -Left ankle XR showed tarsometatarsal alignment maintained without evidence for a Lisfranc injury. Congenitally fused 5th DIP joint. Preserved remaining visualized joint spaces and no joint margin erosions.  -new left foot drop and did not recall trauma but endorsed tenderness   -Possible muscle rupture? anterior tibialis tendon rupture? mononeuritis multiplex?       Plan:  -   52-year-old  male presented with PMHx of TIAs (2011, 2013), CVAs x3 (2/2022, 8/3/2022, 11/5/2022) w/residual expressive aphasia on Eliquis, dyslipidemia and questionable ASD/PFO admitted after patient was found unresponsive at home on 12/2/2022. Currently mental status and fever improving. MRI showed bilateral lentiform nucleus and pontine enhancement need to R/O sarcoidosis. Rheumatology consult for further evaluation    #Leptomeningeal enhancement with Left Foot Drop  differentials: sarcoidosis, IgG4-RD, lymphoma, lupus cerebritis  and CLIPPERS  -Unexplained AMS w/seizure  -negative SHAY/RF/CCP/PR3/MPO/dsDNA/SS/A/SS/B CSF and serum ACE negative, and paraneoplastic negative  -MRI head showed bilateral lentiform nucleus and pontine enhancement   -MR lumbar spine with degenerative changes. MR foot shows acute on chronic denervation edema in left forefoot musculature  -CT chest no sign of mediastinal lymph node. CT abdomen with subcentimeter pelvic lymph nodes      Plan:  -will discuss with neurology utility of EMG given MR results  -will also discuss with neurology if amenable to pelvic lymph node biopsy since no LAD was mentioned in CT A/P from December 2022 along with the questionable thickening/underdistension of the rectum as a tissue diagnosis will ultimately help to elucidate lymphoma vs sarcoid  -pending serum IgG4    case d/w Dr. Bojorquez     52-year-old  male presented with PMHx of TIAs (2011, 2013), CVAs x3 (2/2022, 8/3/2022, 11/5/2022) w/residual expressive aphasia on Eliquis, dyslipidemia and questionable ASD/PFO admitted after patient was found unresponsive at home on 12/2/2022. Currently mental status and fever improving. MRI showed bilateral lentiform nucleus and pontine enhancement need to R/O sarcoidosis. Rheumatology consult for further evaluation    #Leptomeningeal enhancement with Left Foot Drop  differentials: sarcoidosis, IgG4-RD, lymphoma,  and CLIPPERS  -Unexplained AMS w/seizure  -negative SHAY/RF/CCP/PR3/MPO/dsDNA/SS/A/SS/B CSF and serum ACE negative, and paraneoplastic negative  Given negative SHAY/dsDNA/Sjogren's Ab, SLE is unlikely   -MRI head showed bilateral lentiform nucleus and pontine enhancement   -MR lumbar spine with degenerative changes. MR foot shows acute on chronic denervation edema in left forefoot musculature  -CT chest no sign of mediastinal lymph nodes. CT abdomen with subcentimeter pelvic lymph nodes of unclear etiology      Plan:  -will discuss with neurology utility of EMG given MR results  -will also discuss with neurology if amenable to RP LN biopsy since no LAD was mentioned in CT A/P from December 2022 along with the questionable thickening/underdistension of the rectum as a tissue diagnosis will ultimately help to elucidate lymphoma vs sarcoid  -pending serum IgG4    case d/w Dr. Bojorquez

## 2023-01-03 NOTE — PROGRESS NOTE ADULT - SUBJECTIVE AND OBJECTIVE BOX
Patient is a 52y old  Male who presents with a chief complaint of Unresponsive (03 Jan 2023 13:12)    Patient seen this afternoon. His only complaint is his foot drop. He otherwise feels fine.     MEDICATIONS  (STANDING):  aspirin enteric coated 81 milliGRAM(s) Oral daily  atorvastatin 40 milliGRAM(s) Oral at bedtime  bisacodyl Suppository 10 milliGRAM(s) Rectal daily  chlorhexidine 2% Cloths 1 Application(s) Topical daily  dextrose 50% Injectable 25 Gram(s) IV Push once  dextrose 50% Injectable 12.5 Gram(s) IV Push once  dextrose 50% Injectable 25 Gram(s) IV Push once  dextrose Oral Gel 15 Gram(s) Oral once  diphtheria/tetanus/pertussis (acellular) Vaccine (Adacel) 0.5 milliLiter(s) IntraMuscular once  glucagon  Injectable 1 milliGRAM(s) IntraMuscular once  heparin  Infusion.  Unit(s)/Hr (18 mL/Hr) IV Continuous <Continuous>  lacosamide IVPB 100 milliGRAM(s) IV Intermittent every 12 hours  levETIRAcetam  IVPB 1500 milliGRAM(s) IV Intermittent every 12 hours  lidocaine   4% Patch 1 Patch Transdermal every 24 hours  multivitamin 1 Tablet(s) Oral daily    MEDICATIONS  (PRN):  heparin   Injectable 8000 Unit(s) IV Push every 6 hours PRN For aPTT less than 40  heparin   Injectable 4000 Unit(s) IV Push every 6 hours PRN For aPTT between 40 - 57  oxyCODONE    IR 5 milliGRAM(s) Oral every 6 hours PRN moderate and severe pain  phenazopyridine 100 milliGRAM(s) Oral every 8 hours PRN dysuria  traMADol 25 milliGRAM(s) Oral every 6 hours PRN Moderate Pain (4 - 6)        Vital Signs Last 24 Hrs  T(C): 36.5 (03 Jan 2023 08:00), Max: 37.1 (02 Jan 2023 21:01)  T(F): 97.7 (03 Jan 2023 08:00), Max: 98.7 (02 Jan 2023 21:01)  HR: 71 (03 Jan 2023 08:00) (71 - 84)  BP: 117/74 (03 Jan 2023 08:00) (115/67 - 124/78)  BP(mean): --  RR: 18 (03 Jan 2023 08:00) (18 - 18)  SpO2: 99% (03 Jan 2023 08:00) (97% - 99%)    Parameters below as of 03 Jan 2023 08:00  Patient On (Oxygen Delivery Method): room air        PE  NAD  Awake, alert  Anicteric  No c/c/e  No rash grossly  +left foot drop                           11.3   5.81  )-----------( 206      ( 03 Jan 2023 06:05 )             35.0       01-03    141  |  105  |  10  ----------------------------<  94  4.1   |  26  |  0.81    Ca    8.4      03 Jan 2023 06:05  Phos  4.2     01-03  Mg     1.90     01-03      ACC: [id number] EXAM:  MR SPINE LUMBAR WAW IC                          PROCEDURE DATE:  01/03/2023          INTERPRETATION:  CLINICAL INFORMATION: Foot drop    MRI of the lumbar spine was performed using sagittal T1, T2 and STIR   sequence.    Mild loss of the normal lumbar lordosis is seen.    The vertebral body height and alignment appear normal    Disc desiccation is seen involving the lower thoracic and lumbar spine   region. These findings are secondary to chronic degenerative changes.    T12-L1: Normal    L1-2: Normal    L2-3: Normal    L3-4: Mild disc bulge is seen. No significant compromise of the spinal   canal or either neural foramen    L4-5: Disc bulge is identified. There is abnormal T2 prolongation   associated with the central portion of this disc bulge, this is likely   compatible with an annular fissure. Minimal effacement of ventral thecal   sac is seen. Bilateral hypertrophic facet joint changes. No significant   compromise of the spinal canal.    L5-S1: Normal    The conus ends at L2 and appears normal    Evaluation of the paraspinal soft tissues appear normal    IMPRESSION: Degenerative changes as described above.    --- End of Report ---        ACC: [id number] EXAM:  MR LWR EXT JOINT LT                          PROCEDURE DATE:  01/03/2023          INTERPRETATION:  LEFT LOWER EXTREMITY MRI    CLINICAL INDICATION: Left foot drop and neuropathic pain.    COMPARISON: Radiograph dated 12/22/2022. No prior MRI available.    TECHNIQUE: Multiplanar, multisequence MRI was obtained of the left ankle.    FINDINGS:    LIGAMENTS: Talofibular and calcaneofibular ligaments are intact. Chronic   grade 2 sprains of the inferior tibiofibular ligaments, but without   syndesmotic widening. Small chronic grade 2 sprain of the deltoid   ligamentous complex. Calcaneonavicular spring ligament is intact.    DEEP POSTERIOR COMPARTMENT: Mild to moderate posterior tibialis   tendinosis with chronic grade 2 strain of the superficial/medial slip at   the level of the naviculocuneiform joint, but without recent tear. Flexor   tendons are intact.    ACHILLES TENDON: Mild Achilles tendinosis without tear. Trace reactive   fluid within the retrocalcaneal bursa without joseph bursitis.    LATERAL COMPARTMENT: Peroneal tendons are intact. Accessory peroneal   quartus noted.    ANTERIOR COMPARTMENT: Tibialis anterior and extensor tendons are intact.    OSSEOUS: No acute fracture, osteonecrosis, or aggressive neoplasm. Mild   tibiotalar arthrosis without osteochondral lesion of the talar dome. Mild   hindfoot arthrosis. Plantar spur and chronic healed minimally displaced   cortical avulsion stress fracture at the calcaneal origin of the plantar   fascia, but without acute osseous stress reaction. Small type I accessory   navicular.    SYNOVIUM: Nonspecific trace tibiotalar and anterior subtalar effusions.    GENERAL: Normal size and signal intensity of the deep peroneal and tibial   nerves where well visualized. Chronic thickening of the central band of   the plantar fascia with increased intrasubstance signal, nonacute low to   moderate grade partial-thickness tear along its deep margin measuring 1.3   cm in transverse dimension, and mild perifascial edema at the calcaneal   origin of the central band. No significant loss of normal fat signal   within the sinus tarsus. Normal appearance of the neurovascular bundle   within the tarsal tunnel. Patchy acute intramuscular edema like signal   most prominent within the flexor digitorum brevis and pronator quadratus.   Heterogeneous muscular atrophy, most prominently involving the abductor   hallucis, and abductor digiti minimi, and extensor digitorum brevis    IMPRESSION:    1.  Normal appearance of left deep peroneal and tibial nerves at the   level of the ankle.  2.  Acute on chronic denervation edema of left intrinsic hindfoot   musculature.  3.  Acute on chronic left plantar fasciitis with nonacute grade 2   sprain/1.3 cm low to moderate grade partial-thickness tear at the   calcaneal origin of the central band.  4.  Additional lower grade and chronic findings as detailed in the body   section of this report.    --- End of Report ---      ACC: [id number] EXAM:  CT CHEST IC                        ACC: [id number] EXAM:  CT ABDOMEN AND PELVIS IC                          PROCEDURE DATE:  01/01/2023          INTERPRETATION:  CLINICAL INFORMATION: MRI demonstrating leptomeningeal   enhancement. Evaluate for systemic disease.    COMPARISON: CT chest abdomen pelvis 12/2/2022    CONTRAST/COMPLICATIONS:  IV Contrast: Omnipaque 350  90 cc administered   10 cc discarded  Oral Contrast: NONE  Complications: None reported at time of study completion    PROCEDURE:  CT of the Chest, Abdomen and Pelvis was performed.  Sagittal and coronal reformats were performed.    FINDINGS:  CHEST:  LUNGS AND LARGE AIRWAYS: Patent central airways. 3 mm right upper lobe   nodule (2:93) and 4 mm left lower lobe subpleural nodule (2:75).   Triangular-shaped left fissural nodule, likely lymph node. Mild right   lower lobe tree-in-bud opacities, likely distal mucoid impacted airways.   Bilateral lower lobe dependent atelectasis.  PLEURA: No pleural effusion.  VESSELS: Within normal limits.  HEART: Heart size is normal. No pericardial effusion.  MEDIASTINUM AND LUCIANA: No lymphadenopathy.  CHEST WALL AND LOWER NECK: Left chest wall loop recorder.    ABDOMEN AND PELVIS:  LIVER: Within normal limits.  BILE DUCTS: Normal caliber.  GALLBLADDER: Within normal limits.  SPLEEN: Within normal limits.  PANCREAS: Within normal limits.  ADRENALS: Within normal limits.  KIDNEYS/URETERS: Within normal limits.    BLADDER: Within normal limits.  REPRODUCTIVE ORGANS: Mildly enlarged prostate gland.    BOWEL: No bowel obstruction. Appendix is normal. Question thickening   versus underdistention of the rectum. Minimal perirectal stranding.   Recommend direct visualization if not recently performed.  PERITONEUM: No ascites.  VESSELS: Within normal limits.  RETROPERITONEUM/LYMPH NODES: Prominent left external iliac nodes, for   reference node measuring 1.2 x 0.8 cm (2:270).  ABDOMINAL WALL: Fat-containing left inguinal hernia. Small fat-containing   umbilical hernia.  BONES: Degenerative changes. No aggressive osseous lesion.    IMPRESSION:  Question thickening versus underdistention of the rectum, direct   visualization if not recently performed.    Subcentimeter pelvic lymph nodes.        --- End of Report ---

## 2023-01-03 NOTE — PROGRESS NOTE ADULT - SUBJECTIVE AND OBJECTIVE BOX
Neurology Progress Note    S: Patient seen and examined ,    +_ covid precuations +DVT     Medication:  MEDICATIONS  (STANDING):  aspirin enteric coated 81 milliGRAM(s) Oral daily  atorvastatin 40 milliGRAM(s) Oral at bedtime  bisacodyl Suppository 10 milliGRAM(s) Rectal daily  chlorhexidine 2% Cloths 1 Application(s) Topical daily  dextrose 50% Injectable 25 Gram(s) IV Push once  dextrose 50% Injectable 12.5 Gram(s) IV Push once  dextrose 50% Injectable 25 Gram(s) IV Push once  dextrose Oral Gel 15 Gram(s) Oral once  diphtheria/tetanus/pertussis (acellular) Vaccine (Adacel) 0.5 milliLiter(s) IntraMuscular once  glucagon  Injectable 1 milliGRAM(s) IntraMuscular once  heparin  Infusion.  Unit(s)/Hr (18 mL/Hr) IV Continuous <Continuous>  lacosamide IVPB 100 milliGRAM(s) IV Intermittent every 12 hours  levETIRAcetam  IVPB 1500 milliGRAM(s) IV Intermittent every 12 hours  lidocaine   4% Patch 1 Patch Transdermal every 24 hours  multivitamin 1 Tablet(s) Oral daily    MEDICATIONS  (PRN):  heparin   Injectable 8000 Unit(s) IV Push every 6 hours PRN For aPTT less than 40  heparin   Injectable 4000 Unit(s) IV Push every 6 hours PRN For aPTT between 40 - 57  oxyCODONE    IR 5 milliGRAM(s) Oral every 6 hours PRN moderate and severe pain  phenazopyridine 100 milliGRAM(s) Oral every 8 hours PRN dysuria  traMADol 25 milliGRAM(s) Oral every 6 hours PRN Moderate Pain (4 - 6)       Vitals:  Vital Signs Last 24 Hrs  T(C): 37.1 (01-03-23 @ 05:40), Max: 37.1 (01-02-23 @ 21:01)  T(F): 98.7 (01-03-23 @ 05:40), Max: 98.7 (01-02-23 @ 21:01)  HR: 71 (01-03-23 @ 05:40) (71 - 84)  BP: 116/77 (01-03-23 @ 05:40) (115/67 - 124/78)  BP(mean): --  RR: 18 (01-03-23 @ 05:40) (18 - 18)  SpO2: 97% (01-03-23 @ 05:40) (97% - 98%)            General Exam:   General Appearance: Appropriately dressed and in no acute distress       Head: Normocephalic, atraumatic and no dysmorphic features  Ear, Nose, and Throat: Moist mucous membranes    CVS: S1S2+  Resp: No SOB, no wheeze or rhonchi  Abd: soft NTND  Extremities: No edema, no cyanosis  Skin: No bruises, no rashes     Neurological Exam:    Mental Status: Awake, Oriented x 2-3, able to follow simple and complex verbal commands. answers questions appropriately, able to name, repeat and recall. no aphasia, mild dysarthria (tongue bite)   Cranial Nerves: EOMI, PERRLA, VFF, V1-V3 sensation intact, no facial asymmetry, tongue midline, hearing intact B/L, shoulder shrug appropriate, SCM/trap intact.   Motor: Moving uppers > lowers. uppers at least 4+-5/5 ; lowers R>L 3-4/5   Sensation: intact to LT and PP   Reflexes: 1+ throughout at biceps, brachioradialis, triceps, patellars and ankles bilaterally and equal. No clonus. R toe and L toe were both downgoing.  Coordination: no dysmetria   Gait: unable     I personally reviewed the below data/images/labs:  CBC Full  -  ( 03 Jan 2023 06:05 )  WBC Count : 5.81 K/uL  RBC Count : 3.78 M/uL  Hemoglobin : 11.3 g/dL  Hematocrit : 35.0 %  Platelet Count - Automated : 206 K/uL  Mean Cell Volume : 92.6 fL  Mean Cell Hemoglobin : 29.9 pg  Mean Cell Hemoglobin Concentration : 32.3 gm/dL  Auto Neutrophil # : x  Auto Lymphocyte # : x  Auto Monocyte # : x  Auto Eosinophil # : x  Auto Basophil # : x  Auto Neutrophil % : x  Auto Lymphocyte % : x  Auto Monocyte % : x  Auto Eosinophil % : x  Auto Basophil % : x    01-03    141  |  105  |  10  ----------------------------<  94  4.1   |  26  |  0.81    Ca    8.4      03 Jan 2023 06:05  Phos  4.2     01-03  Mg     1.90     01-03      < from: CT Head No Cont (12.02.22 @ 20:27) >    ACC: [id number] EXAM:  CT CERVICAL SPINE                        ACC: [id number] EXAM:  CT MAXILLOFACIAL                        ACC: [id number] EXAM:  CT BRAIN                          PROCEDURE DATE:  12/02/2022        INTERPRETATION:  CLINICAL INDICATION: Trauma.    Technique: Noncontrast axial CT of the head, facial bones, and cervical   spine was performed. 3-D reformats of the facial bones was obtained.   Coronal and sagittal reformats were obtained.      COMPARISON: None.    FINDINGS:  Head CT:  The ventricles and sulci are within normal limits. 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 There is no intraparenchymal hematoma, mass effect   or midline shift. No abnormal extra-axial fluid collections or   hemorrhages are present.    There is swelling of the left lateral scalp. The calvarium is intact.   There are scattered mucosal inflammatory changes in the paranasal sinuses.      Cervical spine CT:  Alignment ismaintained. Vertebral bodies are normal in height, without   evidence of fracture or dislocation. Prevertebral soft tissues are within   normal limits without soft tissue swelling or hematoma.    Intervertebral discs are intact. Neural foramina and spinal canal are   intact.    The visualized lung apices are within normal limits.      Facial bone CT:    No acute facial fracture.    There are scattered mucosal inflammatory changes in the paranasal   sinuses. Mastoid air cells are clear.    Soft tissues appear unremarkable.        IMPRESSION:  CT HEAD: No acute abnormality.  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  CT CERVICAL SPINE: No acute abnormality  CT FACIAL BONE: No acute abnormality    --- End of Report ---       DELGADO IVNA MD; Attending Radiologist  This document has been electronically signed. Dec  2 2022  9:02PM    < end of copied text >  < from: CT Lumbar Spine No Cont (12.02.22 @ 20:27) >    ACC: [id number] EXAM:  CT LUMBAR SPINE                        ACC: [id number] EXAM:  CT THORACIC SPINE                          PROCEDURE DATE:  12/02/2022          INTERPRETATION:  CT thoracic and lumbar spine without IV contrast    CLINICAL INFORMATION:  Trauma  Back pain, fracture.    TECHNIQUE:  Contiguous axial 2 mm sections were obtained through the   thoracic and lumbar spine using a single helical acquisition.     Additional 2 mm sagittal and coronal reconstructions of the spine were   obtained. These additional reformatted images were used to evaluate the   spine for alignment, vertebral fractures and the integrity of the the   posterior elements.   This scan was performed using automatic exposure   control (radiation dose reduction software) to obtain a diagnostic image   quality scan with patient dose as low as reasonably achievable.    FINDINGS:   No prior similar studies are available for review    Thoracic and lumbar vertebral body heights are maintained. No vertebral   fracture is seen. No destructive bone lesion is found.  Alignment is   preserved.  Facet joints appear intact and aligned.    Thoracic and lumbar intervertebral disc spaces appear intact.   Degenerative disc disease and spondylosis is noted at T6-T7 throughL4-5   with loss of disc height and associated degenerative endplate changes.   Mild disc bulges at L2-3 through L4-5 flatten the ventral thecal sac and   narrow the BILATERAL neural foramina.    No paraspinal mass is recognized.  Paraspinal soft tissues appear intact.      IMPRESSION:  Degenerative disc disease and spondylosis is noted at T6-T7   through L4-5 with loss of disc height and associated degenerative   endplate changes. Mild disc bulges at L2-3 through L4-5 flatten the   ventral thecal sac and narrow the BILATERAL neural foramina   No   vertebral fracture is recognized.    --- End of Report ---       ANGIE ESCOBAR MD; Attending Radiologist  This document has been electronically signed. Dec  2 2022  8:55PM    < end of copied text >    EEG Classification / Summary:  Abnormal EEG in a comatose patient due to diffuse suppression gradually improving to discontinuous background, with right hemispheric relative attenuation/suppression. No epileptiform abnormalities or seizures are captured.    Clinical Impression:  Severe diffuse cerebral dysfunction that gradually improves is nonspecific in etiology. In this case, it may be related to sedating medication (propofol) with improvement after decreasing and stopping the medication.  Right hemispheric focal cerebral dysfunction can be structural or functional in etiology.      12/7  Clinical Impression:  -Occasional runs of right frontal lateralized periodic discharges (LPDs) at up to 1.3 Hz indicate a potentially epileptogenic focus in the right frontal region and are on the ictal-interictal continuum.  -A pattern on the ictal-interictal continuum is a pattern that does not qualify as an electrographic seizure or electrographic status epilepticus, but there is a reasonable chance that it may be contributing to impaired alertness, causing other clinical symptoms, and/or contributing to neuronal injury.  -Right hemispheric relative attenuation indicates right hemispheric focal cerebral dysfunction, which can be structural or functional in etiology.  -Rare left frontal epileptiform discharges indicate a potentially epileptogenic focus in this reigon  -Severe diffuse slowing and GRDA indicate severe diffuse cerebral dysfunction of nonspecific etiology.  -No electrographic seizures are captured.     < from: MR Head w/wo IV Cont (12.09.22 @ 19:54) >    ACC: [id number] EXAM:  MR BRAIN WAW IC                          PROCEDURE DATE:  12/09/2022          INTERPRETATION:  CLINICAL INDICATION: CVA, found unresponsive at home      Magnetic resonance imaging of the brain was carried out with transaxial   SPGR, FLAIR, fast spin echo T2 weighted images, axial susceptibility   weighted series, diffusion weighted series and sagittal T1 weighted   series on a 1.5 Maritza magnet. Post contrast axial, coronal and sagittal   T1 weighted images were obtained. 10cc of Gadavist were intravenously   injected, 0 cc were discarded.      Comparison is made with the prior brain CT of 12/5/2022 and MRI 11/5/2022.    Mild ventricular and sulcal prominence is consistent with moderate   atrophy for the patient's age. No acute hemorrhage is identified. There   is a focus of hemosiderin within the alexis which is calcified on CT.   Scattered additional foci of hemosiderin deposition are identified in the   left frontal subcortical white matter and right occipital cortex is   nonspecific but may be related to multiple cavernoma is or hypertension.    There is a tiny focus of diffusion restriction in the right frontal   subcortical white matter and right centrum semiovale which are unchanged   since the prior exam and may represent subacute infarcts. Multiplicity   infarcts may be related to hypercoagulable state or cardioembolic events.    After contrast administration there is normal intracranial vascular   enhancement. No abnormal parenchymal or leptomeningeal enhancement.      IMPRESSION: Atrophy for the patient's age. Right frontal subcortical and   right centrum semiovale punctate infarcts are unchanged since 11/5/2022   and likely represent subacute infarcts. No abnormal enhancement. Focus of   calcification in the alexis with old hemosiderin. A few additional   scattered foci of hemosiderin deposition are unchanged.    --- End of Report ---     < from: MR Head w/wo IV Cont (12.29.22 @ 13:39) >    ACC: [id number] EXAM:  MR BRAIN WAW IC                          PROCEDURE DATE:  12/29/2022          INTERPRETATION:  CLINICAL INFORMATION: Follow-up study for infarct seen   on prior MR 12/9/2022. Patient initially presented to the hospital after   being found unresponsive.    TECHNIQUE: MRI of the brain was performed with and without contrast.   Sagittal and axial T1, axial T2, FLAIR, SWI, diffusion-weighted images   and an ADC map were obtained.    9.5 cc of Gadavist was injected, with 0.5 cc discarded.    COMPARISON: MR brain 12/9/2022    FINDINGS:    Previously seen foci of diffusion restriction in the right frontal   subcortical white matter and right centrum semiovale no longer restricted   diffusion and represent chronic infarcts. No new areas of infarction are   identified.    Foci of susceptibility artifact in the right occipital and right parietal   lobe, unchanged. No acute intracranial hemorrhage. No mass effect or   midline shift.    Areas of contrast enhancement in the bilateral putamen (12-18).   Enhancement of the alexis is also noted.    Moderate prominence of the sulci and ventricles..    There are foci of T2/FLAIR signal hyperintensity within the hemispheric   white matter, which are nonspecific but likely related tosequelae of   microvascular disease.    No abnormal extra-axial fluid collections are present.    Major flow-voids at the base of the brain follow expected course and   contour.    The calvarium is intact. Right ethmoid sinus mucosal thickening.    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 No new areas of infarct are identified. Unchanged scattered   foci of hemosiderin.    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   Differential diagnosis includes infection, sarcoidosis, lymphoma and   CLIPPERS (chronic lymphocytic inflammation with pontine perivascular   enhancement responsive to steroids).    --- End of Report ---         CARMEN KONG MD; Resident Radiologist  This document has been electronically signed.  TANVIR LUNA MD; Attending Radiologist  This document has been electronically signed. Dec 29 2022  5:08PM    < end of copied text >

## 2023-01-03 NOTE — PROGRESS NOTE ADULT - SUBJECTIVE AND OBJECTIVE BOX
Subjective: Patient seen and examined. No new events except as noted.     REVIEW OF SYSTEMS:    CONSTITUTIONAL:+ weakness, fevers or chills  EYES/ENT: No visual changes;  No vertigo or throat pain   NECK: No pain or stiffness  RESPIRATORY: No cough, wheezing, hemoptysis; No shortness of breath  CARDIOVASCULAR: No chest pain or palpitations  GASTROINTESTINAL: No abdominal or epigastric pain. No nausea, vomiting, or hematemesis; No diarrhea or constipation. No melena or hematochezia.  GENITOURINARY: No dysuria, frequency or hematuria  NEUROLOGICAL: No numbness or weakness  SKIN: No itching, burning, rashes, or lesions   All other review of systems is negative unless indicated above.    MEDICATIONS:  MEDICATIONS  (STANDING):  aspirin enteric coated 81 milliGRAM(s) Oral daily  atorvastatin 40 milliGRAM(s) Oral at bedtime  bisacodyl Suppository 10 milliGRAM(s) Rectal daily  chlorhexidine 2% Cloths 1 Application(s) Topical daily  dextrose 50% Injectable 25 Gram(s) IV Push once  dextrose 50% Injectable 12.5 Gram(s) IV Push once  dextrose 50% Injectable 25 Gram(s) IV Push once  dextrose Oral Gel 15 Gram(s) Oral once  diphtheria/tetanus/pertussis (acellular) Vaccine (Adacel) 0.5 milliLiter(s) IntraMuscular once  glucagon  Injectable 1 milliGRAM(s) IntraMuscular once  heparin  Infusion.  Unit(s)/Hr (18 mL/Hr) IV Continuous <Continuous>  lacosamide IVPB 100 milliGRAM(s) IV Intermittent every 12 hours  levETIRAcetam  IVPB 1500 milliGRAM(s) IV Intermittent every 12 hours  lidocaine   4% Patch 1 Patch Transdermal every 24 hours  multivitamin 1 Tablet(s) Oral daily      PHYSICAL EXAM:  T(C): 36.5 (01-03-23 @ 08:00), Max: 37.1 (01-02-23 @ 21:01)  HR: 71 (01-03-23 @ 08:00) (71 - 84)  BP: 117/74 (01-03-23 @ 08:00) (115/67 - 124/78)  RR: 18 (01-03-23 @ 08:00) (18 - 18)  SpO2: 99% (01-03-23 @ 08:00) (97% - 99%)  Wt(kg): --  I&O's Summary        Appearance: Normal	  HEENT:   Normal oral mucosa, PERRL, EOMI	  Lymphatic: No lymphadenopathy , no edema  Cardiovascular: Normal S1 S2, No JVD, No murmurs , Peripheral pulses palpable 2+ bilaterally  Respiratory: Lungs clear to auscultation, normal effort 	  Gastrointestinal:  Soft, Non-tender, + BS	  Skin: No rashes, No ecchymoses, No cyanosis, warm to touch  Musculoskeletal: Normal range of motion, normal strength  Psychiatry:  Mood & affect appropriate  Ext: No edema      LABS:    CARDIAC MARKERS:                                11.3   5.81  )-----------( 206      ( 03 Jan 2023 06:05 )             35.0     01-03    141  |  105  |  10  ----------------------------<  94  4.1   |  26  |  0.81    Ca    8.4      03 Jan 2023 06:05  Phos  4.2     01-03  Mg     1.90     01-03      proBNP:   Lipid Profile:   HgA1c:   TSH:             TELEMETRY: 	    ECG:  	  RADIOLOGY:   DIAGNOSTIC TESTING:  [ ] Echocardiogram:  [ ]  Catheterization:  [ ] Stress Test:    OTHER:

## 2023-01-03 NOTE — PROGRESS NOTE ADULT - SUBJECTIVE AND OBJECTIVE BOX
***consult has been received. note is in progress and incomplete without attending attestation***    Lukasz Trotter MD, PGY-4  Rheumatology Fellow  Reachable on TEAMS    TAMI DUNHAM  [id number]    INTERVAL EVENTS    Review of Systems:  See H&P    MEDICATIONS  (STANDING):  aspirin enteric coated 81 milliGRAM(s) Oral daily  atorvastatin 40 milliGRAM(s) Oral at bedtime  bisacodyl Suppository 10 milliGRAM(s) Rectal daily  chlorhexidine 2% Cloths 1 Application(s) Topical daily  dextrose 50% Injectable 25 Gram(s) IV Push once  dextrose 50% Injectable 12.5 Gram(s) IV Push once  dextrose 50% Injectable 25 Gram(s) IV Push once  dextrose Oral Gel 15 Gram(s) Oral once  diphtheria/tetanus/pertussis (acellular) Vaccine (Adacel) 0.5 milliLiter(s) IntraMuscular once  glucagon  Injectable 1 milliGRAM(s) IntraMuscular once  heparin  Infusion.  Unit(s)/Hr (18 mL/Hr) IV Continuous <Continuous>  lacosamide IVPB 100 milliGRAM(s) IV Intermittent every 12 hours  levETIRAcetam  IVPB 1500 milliGRAM(s) IV Intermittent every 12 hours  lidocaine   4% Patch 1 Patch Transdermal every 24 hours  multivitamin 1 Tablet(s) Oral daily    MEDICATIONS  (PRN):  heparin   Injectable 8000 Unit(s) IV Push every 6 hours PRN For aPTT less than 40  heparin   Injectable 4000 Unit(s) IV Push every 6 hours PRN For aPTT between 40 - 57  oxyCODONE    IR 5 milliGRAM(s) Oral every 6 hours PRN moderate and severe pain  phenazopyridine 100 milliGRAM(s) Oral every 8 hours PRN dysuria  traMADol 25 milliGRAM(s) Oral every 6 hours PRN Moderate Pain (4 - 6)      Allergies    No Known Allergies    Intolerances        PERTINENT MEDICATION HISTORY:  OCCUPATION:  musician    FAMILY HISTORY:  No pertinent family history in first degree relatives      Vital Signs Last 24 Hrs  T(C): 36.5 (2023 08:00), Max: 37.1 (2023 21:01)  T(F): 97.7 (2023 08:00), Max: 98.7 (2023 21:01)  HR: 71 (2023 08:00) (71 - 84)  BP: 117/74 (2023 08:00) (115/67 - 124/78)  BP(mean): --  RR: 18 (2023 08:00) (18 - 18)  SpO2: 99% (2023 08:00) (97% - 99%)    Parameters below as of 2023 08:00  Patient On (Oxygen Delivery Method): room air      Physical Exam:  General: No apparent distress  HEENT: EOMI, MMM  CVS: +S1/S2, RRR, no murmurs/rubs/gallops  Resp: CTA b/l. No crackles/wheezing  GI: Soft, NT/ND +BS  MSK: left foot decrease dorsiflexion, decrease knee extension 3/5. L knee mild swelling but cool on palpation.     Neuro: AAOx3  Skin: no visible rashes      LABS:  cret                        11.3   5.81  )-----------( 206      ( 2023 06:05 )             35.0     01-03    141  |  105  |  10  ----------------------------<  94  4.1   |  26  |  0.81    Ca    8.4      2023 06:05  Phos  4.2     01-03  Mg     1.90     01-03      PTT - ( 2023 11:14 )  PTT:74.5 sec      Urinalysis Basic - ( 29 Dec 2022 23:01 )    Color: Yellow / Appearance: Clear / S.013 / pH: x  Gluc: x / Ketone: Negative  / Bili: Negative / Urobili: <2 mg/dL   Blood: x / Protein: Negative / Nitrite: Negative   Leuk Esterase: Negative / RBC: x / WBC x   Sq Epi: x / Non Sq Epi: x / Bacteria: x      C-Reactive Protein, Serum: <3.0 mg/L (12.28.22 @ 05:16) CSF IgG Index (12.15.22 @ 12:43)   Quantitative Ig mg/dL   Albumin, Serum: 2085 mg/dL   IgG CSF: 4.1 mg/dL   CSF ALBU: 20.8 mg/dL   IgG/Albumin Ratio, Serum: 0.63 Ratio   IgG/Albumin Ratio, CSF: 0.20 Ratio   IgG Index: 0.3   IgG Synthesis: -13.0 mg/day Culture - Fungal, CSF (12.15.22 @ 12:13)   Specimen Source: .CSF CSF   Culture Results:   No fungus isolated at 1 week. Culture - Acid Fast - CSF (12.15.22 @ 12:13)   Specimen Source: .CSF CSF   Acid Fast Bacilli Smear:   Less than 5 cc of CSF received,   unable to perform AFB smear.   Culture Results:   No acid-fast bacilli isolated at 1 week. ****Acid-fast cultures are held   for 6 weeks.**** Angiotensin Converting Enzyme, CSF: <1.5: Results of this test are labeled for research purposes only   by the assays . The performance   RADIOLOGY & ADDITIONAL STUDIES:  < from: MR Head w/wo IV Cont (22 @ 13:39) >  Chronic infarcts of the right frontal subcortical and right centrum   semiovale. No new areas of infarct are identified. Unchanged scattered   foci of hemosiderin.    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   Differential diagnosis includes infection, sarcoidosis, lymphoma and   CLIPPERS (chronic lymphocytic inflammation with pontine perivascular   enhancement unresponsive to steroids).    < end of copied text >  < from: Xray Foot AP + Lateral + Oblique, Left (22 @ 13:20) >  Frontal oblique lateral left foot from 2022 at 1320. No similar   prior studies available for comparison.    IMPRESSION:  No fractures or dislocations.    Tarsometatarsal alignment maintained without evidence for a Lisfranc   injury.    Congenitally fused 5th DIP joint. Preserved remaining visualized joint   spaces and no joint margin erosions.    No lytic or blastic lesions.    < end of copied text >    < from: CT Abdomen and Pelvis w/ IV Cont (23 @ 19:33) >  ACC: [id number] EXAM:  CT CHEST IC                        ACC: [id number] EXAM:  CT ABDOMEN AND PELVIS IC                          PROCEDURE DATE:  2023          INTERPRETATION:  CLINICAL INFORMATION: MRI demonstrating leptomeningeal   enhancement. Evaluate for systemic disease.    COMPARISON: CT chest abdomen pelvis 2022    CONTRAST/COMPLICATIONS:  IV Contrast: Omnipaque 350  90 cc administered   10 cc discarded  Oral Contrast: NONE  Complications: None reported at time of study completion    PROCEDURE:  CT of the Chest, Abdomen and Pelvis was performed.  Sagittal and coronal reformats were performed.    FINDINGS:  CHEST:  LUNGS AND LARGE AIRWAYS: Patent central airways. 3 mm right upper lobe   nodule (2:93) and 4 mm left lower lobesubpleural nodule (2:75).   Triangular-shaped left fissural nodule, likely lymph node. Mild right   lower lobe tree-in-bud opacities, likely distal mucoid impacted airways.   Bilateral lower lobe dependent atelectasis.  PLEURA: No pleural effusion.  VESSELS: Within normal limits.  HEART: Heart size is normal. No pericardial effusion.  MEDIASTINUM AND LUCIANA: No lymphadenopathy.  CHEST WALL AND LOWER NECK: Left chest wall loop recorder.    ABDOMEN AND PELVIS:  LIVER: Within normal limits.  BILE DUCTS: Normal caliber.  GALLBLADDER: Within normal limits.  SPLEEN: Within normal limits.  PANCREAS: Within normal limits.  ADRENALS: Within normal limits.  KIDNEYS/URETERS: Within normal limits.    BLADDER: Within normal limits.  REPRODUCTIVE ORGANS: Mildly enlarged prostate gland.    BOWEL: No bowel obstruction. Appendix is normal. Question thickening   versus underdistention of the rectum. Minimal perirectal stranding.   Recommend direct visualization if not recently performed.  PERITONEUM: No ascites.  VESSELS: Within normal limits.  RETROPERITONEUM/LYMPH NODES: Prominent left external iliac nodes, for   reference node measuring 1.2 x 0.8 cm (2:270).  ABDOMINAL WALL: Fat-containing left inguinal hernia. Small fat-containing   umbilical hernia.  BONES: Degenerative changes. No aggressive osseous lesion.    IMPRESSION:  Question thickening versus underdistention of the rectum, direct   visualization if not recently performed.    Subcentimeter pelvic lymph nodes.    < end of copied text >   Lukasz Trotter MD, PGY-4  Rheumatology Fellow  Reachable on TEAMS    TAMI BARKLEYS  [id number]    INTERVAL EVENTS  Endorsing worsening left foot pain along with no improvement in function. MR foot notable for acute on chronic denervation edema in the left fore foot musculature. MR spine shows mostly degenrative changes with an L4-L5 disc bulge.  Denies fevers/chills/joint pain/swelling/erythema    Review of Systems:  See H&P    MEDICATIONS  (STANDING):  aspirin enteric coated 81 milliGRAM(s) Oral daily  atorvastatin 40 milliGRAM(s) Oral at bedtime  bisacodyl Suppository 10 milliGRAM(s) Rectal daily  chlorhexidine 2% Cloths 1 Application(s) Topical daily  dextrose 50% Injectable 25 Gram(s) IV Push once  dextrose 50% Injectable 12.5 Gram(s) IV Push once  dextrose 50% Injectable 25 Gram(s) IV Push once  dextrose Oral Gel 15 Gram(s) Oral once  diphtheria/tetanus/pertussis (acellular) Vaccine (Adacel) 0.5 milliLiter(s) IntraMuscular once  glucagon  Injectable 1 milliGRAM(s) IntraMuscular once  heparin  Infusion.  Unit(s)/Hr (18 mL/Hr) IV Continuous <Continuous>  lacosamide IVPB 100 milliGRAM(s) IV Intermittent every 12 hours  levETIRAcetam  IVPB 1500 milliGRAM(s) IV Intermittent every 12 hours  lidocaine   4% Patch 1 Patch Transdermal every 24 hours  multivitamin 1 Tablet(s) Oral daily    MEDICATIONS  (PRN):  heparin   Injectable 8000 Unit(s) IV Push every 6 hours PRN For aPTT less than 40  heparin   Injectable 4000 Unit(s) IV Push every 6 hours PRN For aPTT between 40 - 57  oxyCODONE    IR 5 milliGRAM(s) Oral every 6 hours PRN moderate and severe pain  phenazopyridine 100 milliGRAM(s) Oral every 8 hours PRN dysuria  traMADol 25 milliGRAM(s) Oral every 6 hours PRN Moderate Pain (4 - 6)      Allergies    No Known Allergies    Intolerances        PERTINENT MEDICATION HISTORY:  OCCUPATION:  musician    FAMILY HISTORY:  Family history of Susac Syndrome      Vital Signs Last 24 Hrs  T(C): 36.5 (2023 08:00), Max: 37.1 (2023 21:01)  T(F): 97.7 (2023 08:00), Max: 98.7 (2023 21:01)  HR: 71 (2023 08:00) (71 - 84)  BP: 117/74 (2023 08:00) (115/67 - 124/78)  BP(mean): --  RR: 18 (2023 08:00) (18 - 18)  SpO2: 99% (2023 08:00) (97% - 99%)    Parameters below as of 2023 08:00  Patient On (Oxygen Delivery Method): room air      Physical Exam:  General: No apparent distress  HEENT: EOMI, MMM  CVS: +S1/S2, RRR, no murmurs/rubs/gallops  Resp: CTA b/l. No crackles/wheezing  GI: Soft, NT/ND +BS  MSK: left foot stuck in dorsiflexsion. unable to dorsiflex. significantly decreased knee   Neuro: AAOx3  Skin: no visible rashes      LABS:  cret                        11.3   5.81  )-----------( 206      ( 2023 06:05 )             35.0     01-03    141  |  105  |  10  ----------------------------<  94  4.1   |  26  |  0.81    Ca    8.4      2023 06:05  Phos  4.2     01-03  Mg     1.90     01-03      PTT - ( 2023 11:14 )  PTT:74.5 sec      Urinalysis Basic - ( 29 Dec 2022 23:01 )    Color: Yellow / Appearance: Clear / S.013 / pH: x  Gluc: x / Ketone: Negative  / Bili: Negative / Urobili: <2 mg/dL   Blood: x / Protein: Negative / Nitrite: Negative   Leuk Esterase: Negative / RBC: x / WBC x   Sq Epi: x / Non Sq Epi: x / Bacteria: x      C-Reactive Protein, Serum: <3.0 mg/L (12.28.22 @ 05:16) CSF IgG Index (12.15.22 @ 12:43)   Quantitative Ig mg/dL   Albumin, Serum: 2085 mg/dL   IgG CSF: 4.1 mg/dL   CSF ALBU: 20.8 mg/dL   IgG/Albumin Ratio, Serum: 0.63 Ratio   IgG/Albumin Ratio, CSF: 0.20 Ratio   IgG Index: 0.3   IgG Synthesis: -13.0 mg/day Culture - Fungal, CSF (12.15.22 @ 12:13)   Specimen Source: .CSF CSF   Culture Results:   No fungus isolated at 1 week. Culture - Acid Fast - CSF (12.15.22 @ 12:13)   Specimen Source: .CSF CSF   Acid Fast Bacilli Smear:   Less than 5 cc of CSF received,   unable to perform AFB smear.   Culture Results:   No acid-fast bacilli isolated at 1 week. ****Acid-fast cultures are held   for 6 weeks.**** Angiotensin Converting Enzyme, CSF: <1.5: Results of this test are labeled for research purposes only   by the assays . The performance   RADIOLOGY & ADDITIONAL STUDIES:  < from: MR Head w/wo IV Cont (22 @ 13:39) >  Chronic infarcts of the right frontal subcortical and right centrum   semiovale. No new areas of infarct are identified. Unchanged scattered   foci of hemosiderin.    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   Differential diagnosis includes infection, sarcoidosis, lymphoma and   CLIPPERS (chronic lymphocytic inflammation with pontine perivascular   enhancement unresponsive to steroids).    < end of copied text >  < from: Xray Foot AP + Lateral + Oblique, Left (22 @ 13:20) >  Frontal oblique lateral left foot from 2022 at 1320. No similar   prior studies available for comparison.    IMPRESSION:  No fractures or dislocations.    Tarsometatarsal alignment maintained without evidence for a Lisfranc   injury.    Congenitally fused 5th DIP joint. Preserved remaining visualized joint   spaces and no joint margin erosions.    No lytic or blastic lesions.    < end of copied text >    < from: CT Abdomen and Pelvis w/ IV Cont (23 @ 19:33) >  ACC: [id number] EXAM:  CT CHEST IC                        ACC: [id number] EXAM:  CT ABDOMEN AND PELVIS IC                          PROCEDURE DATE:  2023          INTERPRETATION:  CLINICAL INFORMATION: MRI demonstrating leptomeningeal   enhancement. Evaluate for systemic disease.    COMPARISON: CT chest abdomen pelvis 2022    CONTRAST/COMPLICATIONS:  IV Contrast: Omnipaque 350  90 cc administered   10 cc discarded  Oral Contrast: NONE  Complications: None reported at time of study completion    PROCEDURE:  CT of the Chest, Abdomen and Pelvis was performed.  Sagittal and coronal reformats were performed.    FINDINGS:  CHEST:  LUNGS AND LARGE AIRWAYS: Patent central airways. 3 mm right upper lobe   nodule (2:93) and 4 mm left lower lobesubpleural nodule (2:75).   Triangular-shaped left fissural nodule, likely lymph node. Mild right   lower lobe tree-in-bud opacities, likely distal mucoid impacted airways.   Bilateral lower lobe dependent atelectasis.  PLEURA: No pleural effusion.  VESSELS: Within normal limits.  HEART: Heart size is normal. No pericardial effusion.  MEDIASTINUM AND LUCIANA: No lymphadenopathy.  CHEST WALL AND LOWER NECK: Left chest wall loop recorder.    ABDOMEN AND PELVIS:  LIVER: Within normal limits.  BILE DUCTS: Normal caliber.  GALLBLADDER: Within normal limits.  SPLEEN: Within normal limits.  PANCREAS: Within normal limits.  ADRENALS: Within normal limits.  KIDNEYS/URETERS: Within normal limits.    BLADDER: Within normal limits.  REPRODUCTIVE ORGANS: Mildly enlarged prostate gland.    BOWEL: No bowel obstruction. Appendix is normal. Question thickening   versus underdistention of the rectum. Minimal perirectal stranding.   Recommend direct visualization if not recently performed.  PERITONEUM: No ascites.  VESSELS: Within normal limits.  RETROPERITONEUM/LYMPH NODES: Prominent left external iliac nodes, for   reference node measuring 1.2 x 0.8 cm (2:270).  ABDOMINAL WALL: Fat-containing left inguinal hernia. Small fat-containing   umbilical hernia.  BONES: Degenerative changes. No aggressive osseous lesion.    IMPRESSION:  Question thickening versus underdistention of the rectum, direct   visualization if not recently performed.    Subcentimeter pelvic lymph nodes.    < end of copied text >

## 2023-01-03 NOTE — PROGRESS NOTE ADULT - ASSESSMENT
52-year-old male with a PMHx significant for TIAs (2011, 2013), CVAs x3 (02/2022 s/p tPA, 8/3/2022 s/p tPA, 11/5/2022 with residual expressive aphasia) on Eliquis, and HLD BIBEMS after being found unresponsive at home on the floor, last known well 12/1 at around noon. C-collar was placed. Patient was intubated in the ED for airway protection. CTH with no acute findings, vEEG with no identified seizures. Patient was weaned off pressors, extubated, and transitioned to floors 12/4/22. 12/5 am RRT called for seizure like activity with urinary incontinence, tongue biting, and R>L posturing, patient s/p ativan 2mg x3. Anesthesia called to RRT for intubation. MICU accepting patient for seizure like activity requiring intubation.      #AMS, Seizure like activity  EEG 12/7: concern for epileptiform activity; though EEG from 12/5 without such abnormalities.   - f/u neuro recs  - Patient found down and unresponsive at home on 12/2, last known well 12/1 at around noon. Intubated in ED 12/2, extubated in MICU 12/4  - RRT 12/5: seizure like acting with urinary incontinence, convulsions, and tongue biting; s/p ativan 2mg x3 with pauses in seizure activity following each; intubated for airway protection with rocuronium. Prolactin ~75   - CTH and CT cervical spine 12/2 negative for any acute pathologies. Tox screen on admission negative. 12/5: Stable exam from prior CT head, chronic microvascular ischemic changes, parenchymal loss, dystrophic calcification of central portion of alexis unchanged.  - Video EEG 12/3-12/4 without any seizure like activity, moderate to severe nonspecific diffuse or multifocal cerebral dysfunction  - c/w keppra 1500mg q12 maintenance, Keppra loaded 4.5g  - patient extubated again on 12/8th  -MRI noted, chronic CVA, no acute CVA noted     MRI noted , new findings for leptomeningeal disease, R/O sarcoid vs lymphoma   Hematology and rheum eval called  repeat scan per heme    LP   LE DVT study positive eliquis switched to heparin full AC for now         #CVAs/TIAs  - Hx of TIAs in 2011 and 2013, CVAs x3 in 02/22, 8/22, and 11/22 with residual expressive aphasia   - on Eliquis and Aspirin at home for hx of stroke  - PFO work up in past negative, no evidence of PFO on MIMI X2  - c/w ASA   - c/w heparin gtt, was on hold, resume eliquis   - Restart Statin when LFTs and CPK improve per neurology  - Being worked up for Mixed Connective Tissue Disease OP- f/u p-ANCA, c-ANCA/ [id number] negative  - Neuro recs appreciated  - repeat brain MRI noted   - LE pain, check MRI     Plan For repeat LP with cytology   GI for colon thickening seen on CT     # Hypernatremia  tredn Na level   renal eval appreciated  S/P hydraiton, trend Na level     #Thecal sac flattening   - CT thoracic and lumbar spine showing degenerative disc disease T6-T7 through L4-L5 and mild disc bulges at L2-L3 through L4-L5 that flatten the ventral thecal sac and narrowing of the bilateral neural foramina  - Will Monitor for now, will consider neuro/neurosurg evaluation in the future    #HLD  - atorvastatin on hold due to elevated CK and LFTs  - restart when possible for secondary stroke prevention    #?ASD/PFO  - Patient reportedly found to have a PFO in February 2022 during a stroke workup at [facility]. Patient presented for a PFO closure in November 2022. Notably, no PFO was found at the time and no intervention was performed by Dr. Lynn.    - TTE 11/5/22 EF 57% and grossly normal LV function  - MIMI performed bedside 12/5 1 of 2 studies (+) on bubble study (uploaded to [facility])  - elevated D d-andrew, Check LE DVT study posotve       #Rhabdomyolysis  - CK 8720 on admission, peaked at 15k, now downtrending  - DDx: prolonged downtime myositis v hyperthermia? (T 105.4 F) v sepsis v seizure induced    - L Ankle adn Knee pain, swelling  S/P Tap last week  cont to have pain and tenderness and swelling  Repeat imaging   Ortho follow up           # Elevated Winston level  CHeck Abd US noted    GI eval PRN   Trend LFTs , normalized, no need for MRCP       #Partial SBO - resolved  - CT chest, abdomen, and pelvis w/ contrast concerning for possible partial SBO without transition point.  - Surgery consulted, no acute intervention at this time  - 12/5 KUB - negative for ileus or SBO  - Hold TF for 12/7 for possible trial of extubation.  - otherwise diet per nutrition      #Leukocytosis  - worsening leukocytosis  - monitor for fever  - Ortho eval fro knee swelling   - Pan Cx  - LP by IR , follow up results         #Concern for sepsis  Unclear source, aspiration v less likely meningitis   BCx (12/2 NGTD repeat 12/5 NGTD) and UC 12/2 NGTD  - Patient initially presented with AMS, T 105.4, tachycardic, and tachypneic   - UA negative  - s/p vanco and zosyn x1 in ED 12/2, ceftriaxone 2g BID 12/3-12/4, vanco 12/3-12/4  - 12/7 DCed Vanc since BCx from 12/5 NGTD  - c/w zosyn for aspiration pna presumed. completed course, monitor   - ID eval appreciated  - febrile again, Ortho for knee asp      # ANemia  noted, no gross bleeding  type and screen  GI eval     Discussed with patient's family over the phone

## 2023-01-03 NOTE — PROGRESS NOTE ADULT - PROBLEM SELECTOR PLAN 4
Hx of TIAs in 2011 and 2013, CVAs x3 in 02/22, 8/22, and 11/22 with residual expressive aphasia    - on ASA/Eliquis (eliquis on hold)  on hep gtt

## 2023-01-03 NOTE — PROGRESS NOTE ADULT - ASSESSMENT
52-year-old male with a history of TIAs (2011, 2013), CVAs x3 (2/2022, 8/3/2022, 11/5/2022) w/residual expressive aphasia on Eliquis, dyslipidemia and questionable ASD/PFO admitted after patient was found unresponsive at home on 12/2/2022, now improving mental status but MRI with bilateral lentiform nucleus and pontine enhancement.     Abnormal MRI findings-   Low suspicion for lymphoma as he has had a negative LP/flow cytometry on 12/15 and his mental status is improving without any intervention with chemo/immunotherapy.   With MRI findings/symptoms of foot drop, recommend repeat lumbar puncture. Neurology and podiatry following.   He may require a biopsy.   CT c/a/p showed questionable thickening vs underdistention of rectum and subcm pelvic lymph nodes- GI eval   Serum LDH is only 312.    Anemia-  Not anemia on admission, likely related to blood draws.   No evidence of B12/folate, iron deficiency.     On eliquis for prior CVA history.    Will continue to follow.    Caro Dow PA-C  Hematology/Oncology  [facility]  [phone] (office)  [phone] (alt office)  Evenings and weekends please call MD on call or office

## 2023-01-03 NOTE — PROGRESS NOTE ADULT - SUBJECTIVE AND OBJECTIVE BOX
Name of Patient : TAMI DUNHAM  MRN: [id number]  Date of visit: 01-03-23       Subjective: Patient seen and examined. No new events except as noted.   doing okay    REVIEW OF SYSTEMS:    CONSTITUTIONAL: No weakness, fevers or chills  EYES/ENT: No visual changes;  No vertigo or throat pain   NECK: No pain or stiffness  RESPIRATORY: No cough, wheezing, hemoptysis; No shortness of breath  CARDIOVASCULAR: No chest pain or palpitations  GASTROINTESTINAL: No abdominal or epigastric pain. No nausea, vomiting, or hematemesis; No diarrhea or constipation. No melena or hematochezia.  GENITOURINARY: No dysuria, frequency or hematuria  NEUROLOGICAL: No numbness or weakness  SKIN: No itching, burning, rashes, or lesions   All other review of systems is negative unless indicated above.    MEDICATIONS:  MEDICATIONS  (STANDING):  aspirin enteric coated 81 milliGRAM(s) Oral daily  atorvastatin 40 milliGRAM(s) Oral at bedtime  bisacodyl Suppository 10 milliGRAM(s) Rectal daily  chlorhexidine 2% Cloths 1 Application(s) Topical daily  dextrose 50% Injectable 25 Gram(s) IV Push once  dextrose 50% Injectable 12.5 Gram(s) IV Push once  dextrose 50% Injectable 25 Gram(s) IV Push once  dextrose Oral Gel 15 Gram(s) Oral once  diphtheria/tetanus/pertussis (acellular) Vaccine (Adacel) 0.5 milliLiter(s) IntraMuscular once  glucagon  Injectable 1 milliGRAM(s) IntraMuscular once  heparin  Infusion.  Unit(s)/Hr (18 mL/Hr) IV Continuous <Continuous>  lacosamide IVPB 100 milliGRAM(s) IV Intermittent every 12 hours  levETIRAcetam  IVPB 1500 milliGRAM(s) IV Intermittent every 12 hours  lidocaine   4% Patch 1 Patch Transdermal every 24 hours  multivitamin 1 Tablet(s) Oral daily      PHYSICAL EXAM:  T(C): 37.1 (01-03-23 @ 21:17), Max: 37.1 (01-03-23 @ 05:40)  HR: 74 (01-03-23 @ 21:17) (71 - 85)  BP: 114/68 (01-03-23 @ 21:17) (114/68 - 117/74)  RR: 19 (01-03-23 @ 21:17) (18 - 19)  SpO2: 97% (01-03-23 @ 21:17) (97% - 99%)  Wt(kg): --  I&O's Summary        Appearance: Normal	  HEENT:  PERRLA   Lymphatic: No lymphadenopathy   Cardiovascular: Normal S1 S2, no JVD  Respiratory: normal effort , clear  Gastrointestinal:  Soft, Non-tender  Skin: No rashes,  warm to touch  Psychiatry:  Mood & affect appropriate  Musculuskeletal: No edema      All labs, Imaging and EKGs personally reviewed                           11.3   5.81  )-----------( 206      ( 03 Jan 2023 06:05 )             35.0               01-03    141  |  105  |  10  ----------------------------<  94  4.1   |  26  |  0.81    Ca    8.4      03 Jan 2023 06:05  Phos  4.2     01-03  Mg     1.90     01-03      PTT - ( 03 Jan 2023 17:55 )  PTT:65.8 sec

## 2023-01-03 NOTE — PROGRESS NOTE ADULT - ASSESSMENT
52-year-old  M known to me from outpatient setting with  TIAs (2011, 2013), Strokes x3 (02/2022 s/p tPA, 8/3/2022 s/p tPA, 11/5/2022 with residual expressive aphasia) on Eliquis, was on testosterone outpatient stopped after last stroke, HLD BIBEMS after being found unresponsive  Temp 105.4 on arrival tachy and /110. 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   CT cervical spine and maxillofacial scans negative.  CT chest, abdomen, and pelvis w/ contrast concerning for possible partial SBO without transition point. Surgery consulted in ED, no acute surgical intervention at this time.   CT thoracic and lumbar spine showing degenerative disc disease T6-T7 through L4-L5 and mild disc bulges at L2-L3 through L4-L5 that flatten the ventral thecal sac and narrowing of the bilateral neural foramina.  MRI 11/2022: R centrum semiovale and R posterior frontal infarcts   takes adderall at home   + rhabdo  EEG no seizures   + transaminitis   CTH 12/5 stable   outpatient hypercoag workup neg   12/5 extubated then reintibuated for seizure activity and tx back to ICU   EEG this AM 12/5 with only slowing   EEG 12/6 slowing but no seizures   spoke with Pippa Conti (friend) who states after he was taken off the testosterone he ordered a mail order testosterone supplement, unclear if he started it yet, unclear what this was  12/7 EEG no electrogtraphic seizures captured but R LPDs, rare L frontal discharges, severe GRDA.   12/8 EEG improved.  extubated. following some commands   12/9 moving uppers> lowers   12/11 AAOx2 uppers 4-5/5; not moving LLE well   MRI brain neg for new stroke   s/p L knee tap arthrocentesis  by ortho on 12/12   spoke with friend pippa conti - she counted his adderrall and didn't take extra tables. did find ashwagandha and red wood (bottle was sealed) supplement in his apartment   s/p LP 12/5 --> CSF glucose and protein WNL.  will f/u remaining studies. non infectious appearing --> paraneoplastic was neg   o/e AAOx2, slurred but 2/2 tongue bite   + transaminitis   more alert  12/20 neuro exam improving AAOx2-3   c/o L ankle pain in addition to knee  + COVID   MRI brain 12/29: chronic R frontal and R centrum semiovale infarcts unchanged.  more conspicuous and newer bilateral lentiform nucleus and pontine enhancement of unclear etiology. ddx CLIPPERS, sarcoid, lymphoma vs infection.  (reviewed [facility] neuro radiology Dr. fried)   LE doppler L DVT     Impression:   1) AMS of unclear etiology, possible now seizure, related to adderral withdrawal? 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 had MIMI was question of PFO but not found. s/p ILR   3) bilateral lentiform nucleus and pontine enhacement     - f/u MRI L spine ordered and MR Lower extremity  - CT C A P --> neg.  --> LE doppler L DVT   - unclear of what to make of new MRI findings from 12/29, doubt related to covid as was present minimally on prior MRI.   lower suspicion for CLIPPERS.  would workup for lymphoma and sarcoid at this point.  if no etiology found would treat with high dose steroids 500mg BID x 3-5 days.  may need to consider repeat LP as well.  f/u with heme/onc as well as rheumatology   - covid precuations    -  L ankle imaging --. podiatry.   - resume AC s/p LP when able given L knee --> eliquis 5mg BID restarted but now changed to heparin drip   - possible MRCP planning for Monday. GI f/u.  elevated LFTs --> LFTs improved--> downtrending . MRCP deferred   - ortho for L knee s/p tap / arthrocentesis   -  Vimpat 100mg BID    - keppra 1500mg BID.   - fever on arrival, treatred initially for meningitis.  was on abx for aspiration PNA.  >LP done--> non  infectious   - IVF  - CPK elevated. trend improving   -   endocrine workup/eval   - there was some concern outpatient he may have a mixed  connective tissue disorder   - statin therapy for secondary stroke prevention when LFTS and CPK improved   - telemetry  - PT/OT/SS/SLP, OOBC  - check FS, glucose control <180  - GI/DVT ppx  - Thank you for allowing me to participate in the care of this patient. Call with questions.   - spoke with friend at bedside, Galilea 12/11   - spoke with Pippa Conti at [phone] for more collateral info and to provide update. spoke again 12/12 over phone. spoke 12/19   spoke with parents at bedside 12/20   d/c raquel bender now covid auth good until 12/31   Braxton Forde MD  Vascular Neurology  Office: [phone]    52-year-old  M known to me from outpatient setting with  TIAs (2011, 2013), Strokes x3 (02/2022 s/p tPA, 8/3/2022 s/p tPA, 11/5/2022 with residual expressive aphasia) on Eliquis, was on testosterone outpatient stopped after last stroke, HLD BIBEMS after being found unresponsive  Temp 105.4 on arrival tachy and /110. 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   CT cervical spine and maxillofacial scans negative.  CT chest, abdomen, and pelvis w/ contrast concerning for possible partial SBO without transition point. Surgery consulted in ED, no acute surgical intervention at this time.   CT thoracic and lumbar spine showing degenerative disc disease T6-T7 through L4-L5 and mild disc bulges at L2-L3 through L4-L5 that flatten the ventral thecal sac and narrowing of the bilateral neural foramina.  MRI 11/2022: R centrum semiovale and R posterior frontal infarcts   takes adderall at home   + rhabdo  EEG no seizures   + transaminitis   CTH 12/5 stable   outpatient hypercoag workup neg   12/5 extubated then reintibuated for seizure activity and tx back to ICU   EEG this AM 12/5 with only slowing   EEG 12/6 slowing but no seizures   spoke with Pippa Conti (friend) who states after he was taken off the testosterone he ordered a mail order testosterone supplement, unclear if he started it yet, unclear what this was  12/7 EEG no electrogtraphic seizures captured but R LPDs, rare L frontal discharges, severe GRDA.   12/8 EEG improved.  extubated. following some commands   12/9 moving uppers> lowers   12/11 AAOx2 uppers 4-5/5; not moving LLE well   MRI brain neg for new stroke   s/p L knee tap arthrocentesis  by ortho on 12/12   spoke with friend pippa conti - she counted his adderrall and didn't take extra tables. did find ashwagandha and red wood (bottle was sealed) supplement in his apartment   s/p LP 12/5 --> CSF glucose and protein WNL.  will f/u remaining studies. non infectious appearing --> paraneoplastic was neg   o/e AAOx2, slurred but 2/2 tongue bite   + transaminitis   more alert  12/20 neuro exam improving AAOx2-3   c/o L ankle pain in addition to knee  + COVID   MRI brain 12/29: chronic R frontal and R centrum semiovale infarcts unchanged.  more conspicuous and newer bilateral lentiform nucleus and pontine enhancement of unclear etiology. ddx CLIPPERS, sarcoid, lymphoma vs infection.  (reviewed [facility] neuro radiology Dr. fried)   LE doppler L DVT     Impression:   1) AMS of unclear etiology, possible now seizure, related to adderral withdrawal? 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 had MIMI was question of PFO but not found. s/p ILR   3) bilateral lentiform nucleus and pontine enhacement     - f/u MRI L spine ordered and MR Lower extremity  - CT C A P --> neg.  --> LE doppler L DVT   - unclear of what to make of new MRI findings from 12/29, doubt related to covid as was present minimally on prior MRI.   lower suspicion for CLIPPERS.  would workup for lymphoma and sarcoid at this point.  if no etiology found would treat with high dose steroids 500mg BID x 3-5 days.  may need to consider repeat LP as well.  f/u with heme/onc as well as rheumatology   - covid precuations    -  L ankle imaging --. podiatry.   - resume AC s/p LP when able given L knee --> eliquis 5mg BID restarted but now changed to heparin drip   - possible MRCP planning for Monday. GI f/u.  elevated LFTs --> LFTs improved--> downtrending . MRCP deferred   - ortho for L knee s/p tap / arthrocentesis   -  Vimpat 100mg BID    - keppra 1500mg BID.   - fever on arrival, treatred initially for meningitis.  was on abx for aspiration PNA.  >LP done--> non  infectious   - IVF  - CPK elevated. trend improving   -   endocrine workup/eval   - there was some concern outpatient he may have a mixed  connective tissue disorder   - statin therapy for secondary stroke prevention when LFTS and CPK improved   - telemetry  - PT/OT/SS/SLP, OOBC  - check FS, glucose control <180  - GI/DVT ppx  - Thank you for allowing me to participate in the care of this patient. Call with questions.   - spoke with friend at bedside, Galilea 12/11   - spoke with Pippa Conti at [phone] for more collateral info and to provide update. spoke again 12/12 over phone. spoke 12/19, 1/3  spoke with parents at bedside 12/20   d/c raquel bender now covid auth good until 12/31    Braxton Forde MD  Vascular Neurology  Office: [phone]    52-year-old  M known to me from outpatient setting with  TIAs (2011, 2013), Strokes x3 (02/2022 s/p tPA, 8/3/2022 s/p tPA, 11/5/2022 with residual expressive aphasia) on Eliquis, was on testosterone outpatient stopped after last stroke, HLD BIBEMS after being found unresponsive  Temp 105.4 on arrival tachy and /110. 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   CT cervical spine and maxillofacial scans negative.  CT chest, abdomen, and pelvis w/ contrast concerning for possible partial SBO without transition point. Surgery consulted in ED, no acute surgical intervention at this time.   CT thoracic and lumbar spine showing degenerative disc disease T6-T7 through L4-L5 and mild disc bulges at L2-L3 through L4-L5 that flatten the ventral thecal sac and narrowing of the bilateral neural foramina.  MRI 11/2022: R centrum semiovale and R posterior frontal infarcts   takes adderall at home   + rhabdo  EEG no seizures   + transaminitis   CTH 12/5 stable   outpatient hypercoag workup neg   12/5 extubated then reintibuated for seizure activity and tx back to ICU   EEG this AM 12/5 with only slowing   EEG 12/6 slowing but no seizures   spoke with Pippa Conti (friend) who states after he was taken off the testosterone he ordered a mail order testosterone supplement, unclear if he started it yet, unclear what this was  12/7 EEG no electrogtraphic seizures captured but R LPDs, rare L frontal discharges, severe GRDA.   12/8 EEG improved.  extubated. following some commands   12/9 moving uppers> lowers   12/11 AAOx2 uppers 4-5/5; not moving LLE well   MRI brain neg for new stroke   s/p L knee tap arthrocentesis  by ortho on 12/12   spoke with friend pippa conti - she counted his adderrall and didn't take extra tables. did find ashwagandha and red wood (bottle was sealed) supplement in his apartment   s/p LP 12/5 --> CSF glucose and protein WNL.  will f/u remaining studies. non infectious appearing --> paraneoplastic was neg   o/e AAOx2, slurred but 2/2 tongue bite   + transaminitis   more alert  12/20 neuro exam improving AAOx2-3   c/o L ankle pain in addition to knee  + COVID   MRI brain 12/29: chronic R frontal and R centrum semiovale infarcts unchanged.  more conspicuous and newer bilateral lentiform nucleus and pontine enhancement of unclear etiology. ddx CLIPPERS, sarcoid, lymphoma vs infection.  (reviewed [facility] neuro radiology Dr. fried)   LE doppler L DVT   family hx of susallie in cousin    Impression:   1) AMS of unclear etiology, possible now seizure, related to adderral withdrawal? 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 had MIMI was question of PFO but not found. s/p ILR   3) bilateral lentiform nucleus and pontine enhacement     - f/u MRI L spine ordered and MR Lower extremity  - CT C A P --> neg.  --> LE doppler L DVT   - unclear of what to make of new MRI findings from 12/29, doubt related to covid as was present minimally on prior MRI.   lower suspicion for CLIPPERS.  would workup for lymphoma and sarcoid at this point.  if no etiology found would treat with high dose steroids 500mg BID x 3-5 days.  may need to consider repeat LP as well.  f/u with heme/onc as well as rheumatology   - covid precuations    -  L ankle imaging --. podiatry.   - resume AC s/p LP when able given L knee --> eliquis 5mg BID restarted but now changed to heparin drip   - possible MRCP planning for Monday. GI f/u.  elevated LFTs --> LFTs improved--> downtrending . MRCP deferred   - ortho for L knee s/p tap / arthrocentesis   -  Vimpat 100mg BID    - keppra 1500mg BID.   - fever on arrival, treatred initially for meningitis.  was on abx for aspiration PNA.  >LP done--> non  infectious   - IVF  - CPK elevated. trend improving   -   endocrine workup/eval   - there was some concern outpatient he may have a mixed  connective tissue disorder   - statin therapy for secondary stroke prevention when LFTS and CPK improved   - telemetry  - PT/OT/SS/SLP, OOBC  - check FS, glucose control <180  - GI/DVT ppx  - Thank you for allowing me to participate in the care of this patient. Call with questions.   - spoke with friend at bedside, Galilea 12/11   - spoke with Pippa Conti at [phone] for more collateral info and to provide update. spoke again 12/12 over phone. spoke 12/19, 1/3  spoke with parents at bedside 12/20   d/c planning [facility] bender now covid auth good until 12/31    Braxton Forde MD  Vascular Neurology  Office: [phone]

## 2023-01-03 NOTE — PROGRESS NOTE ADULT - ATTENDING COMMENTS
Agree with above, A/P amended where necessary     Dr. Eugenia Bojorquez  Rheumatology Attending  [facility]  Rheumatology at [place]     Contact:   call the office:: [phone] or reach va Microsoft Teams, weekdays before 5 pm   after 5 pm or on weekends, contact [phone]

## 2023-01-04 LAB
ALBUMIN SERPL ELPH-MCNC: 3.1 G/DL — LOW (ref 3.3–5)
ALP SERPL-CCNC: 89 U/L — SIGNIFICANT CHANGE UP (ref 40–120)
ALT FLD-CCNC: 25 U/L — SIGNIFICANT CHANGE UP (ref 4–41)
ANION GAP SERPL CALC-SCNC: 11 MMOL/L — SIGNIFICANT CHANGE UP (ref 7–14)
APPEARANCE CSF: CLEAR — SIGNIFICANT CHANGE UP
APPEARANCE SPUN FLD: COLORLESS — SIGNIFICANT CHANGE UP
APTT BLD: 32.2 SEC — SIGNIFICANT CHANGE UP (ref 27–36.3)
APTT BLD: 57 SEC — HIGH (ref 27–36.3)
APTT BLD: 88.4 SEC — HIGH (ref 27–36.3)
AST SERPL-CCNC: 22 U/L — SIGNIFICANT CHANGE UP (ref 4–40)
BILIRUB SERPL-MCNC: 0.4 MG/DL — SIGNIFICANT CHANGE UP (ref 0.2–1.2)
BUN SERPL-MCNC: 14 MG/DL — SIGNIFICANT CHANGE UP (ref 7–23)
CALCIUM SERPL-MCNC: 8.7 MG/DL — SIGNIFICANT CHANGE UP (ref 8.4–10.5)
CHLORIDE SERPL-SCNC: 103 MMOL/L — SIGNIFICANT CHANGE UP (ref 98–107)
CO2 SERPL-SCNC: 25 MMOL/L — SIGNIFICANT CHANGE UP (ref 22–31)
COLOR CSF: COLORLESS — SIGNIFICANT CHANGE UP
CREAT SERPL-MCNC: 0.77 MG/DL — SIGNIFICANT CHANGE UP (ref 0.5–1.3)
EGFR: 108 ML/MIN/1.73M2 — SIGNIFICANT CHANGE UP
GLUCOSE CSF-MCNC: 58 MG/DL — SIGNIFICANT CHANGE UP (ref 40–70)
GLUCOSE SERPL-MCNC: 98 MG/DL — SIGNIFICANT CHANGE UP (ref 70–99)
GRAM STN FLD: SIGNIFICANT CHANGE UP
HCT VFR BLD CALC: 34.8 % — LOW (ref 39–50)
HCT VFR BLD CALC: 36.8 % — LOW (ref 39–50)
HGB BLD-MCNC: 11.3 G/DL — LOW (ref 13–17)
HGB BLD-MCNC: 12.2 G/DL — LOW (ref 13–17)
IGG SERPL-MCNC: 1294 MG/DL — SIGNIFICANT CHANGE UP (ref 603–1613)
IGG1 SER-MCNC: 903 MG/DL — HIGH (ref 248–810)
IGG2 SER-MCNC: 559 MG/DL — HIGH (ref 130–555)
IGG3 SER-MCNC: 12 MG/DL — LOW (ref 15–102)
IGG4 SER-MCNC: 74 MG/DL — SIGNIFICANT CHANGE UP (ref 2–96)
INR BLD: 1.01 RATIO — SIGNIFICANT CHANGE UP (ref 0.88–1.16)
LYMPHOCYTES # CSF: 71 % — SIGNIFICANT CHANGE UP
MAGNESIUM SERPL-MCNC: 1.9 MG/DL — SIGNIFICANT CHANGE UP (ref 1.6–2.6)
MCHC RBC-ENTMCNC: 29.8 PG — SIGNIFICANT CHANGE UP (ref 27–34)
MCHC RBC-ENTMCNC: 30.1 PG — SIGNIFICANT CHANGE UP (ref 27–34)
MCHC RBC-ENTMCNC: 32.5 GM/DL — SIGNIFICANT CHANGE UP (ref 32–36)
MCHC RBC-ENTMCNC: 33.2 GM/DL — SIGNIFICANT CHANGE UP (ref 32–36)
MCV RBC AUTO: 90.9 FL — SIGNIFICANT CHANGE UP (ref 80–100)
MCV RBC AUTO: 91.8 FL — SIGNIFICANT CHANGE UP (ref 80–100)
MONOS+MACROS NFR CSF: 29 % — SIGNIFICANT CHANGE UP
NEUTROPHILS # CSF: 0 % — SIGNIFICANT CHANGE UP
NRBC # BLD: 0 /100 WBCS — SIGNIFICANT CHANGE UP (ref 0–0)
NRBC # BLD: 0 /100 WBCS — SIGNIFICANT CHANGE UP (ref 0–0)
NRBC # FLD: 0 K/UL — SIGNIFICANT CHANGE UP (ref 0–0)
NRBC # FLD: 0 K/UL — SIGNIFICANT CHANGE UP (ref 0–0)
NRBC NFR CSF: 1 CELLS/UL — SIGNIFICANT CHANGE UP (ref 0–5)
PHOSPHATE SERPL-MCNC: 3.8 MG/DL — SIGNIFICANT CHANGE UP (ref 2.5–4.5)
PLATELET # BLD AUTO: 189 K/UL — SIGNIFICANT CHANGE UP (ref 150–400)
PLATELET # BLD AUTO: 190 K/UL — SIGNIFICANT CHANGE UP (ref 150–400)
POTASSIUM SERPL-MCNC: 3.8 MMOL/L — SIGNIFICANT CHANGE UP (ref 3.5–5.3)
POTASSIUM SERPL-SCNC: 3.8 MMOL/L — SIGNIFICANT CHANGE UP (ref 3.5–5.3)
PROT CSF-MCNC: 56 MG/DL — HIGH (ref 15–45)
PROT SERPL-MCNC: 6.6 G/DL — SIGNIFICANT CHANGE UP (ref 6–8.3)
PROTHROM AB SERPL-ACNC: 11.7 SEC — SIGNIFICANT CHANGE UP (ref 10.5–13.4)
PS IGA SER-ACNC: <1 — SIGNIFICANT CHANGE UP (ref 0–19)
PS IGG SER-ACNC: 13 UNITS — SIGNIFICANT CHANGE UP (ref 0–30)
PS IGM SER-ACNC: <10 UNITS — SIGNIFICANT CHANGE UP (ref 0–30)
RBC # BLD: 3.79 M/UL — LOW (ref 4.2–5.8)
RBC # BLD: 4.05 M/UL — LOW (ref 4.2–5.8)
RBC # CSF: 0 CELLS/UL — SIGNIFICANT CHANGE UP (ref 0–0)
RBC # FLD: 14.1 % — SIGNIFICANT CHANGE UP (ref 10.3–14.5)
RBC # FLD: 14.1 % — SIGNIFICANT CHANGE UP (ref 10.3–14.5)
SODIUM SERPL-SCNC: 139 MMOL/L — SIGNIFICANT CHANGE UP (ref 135–145)
SPECIMEN SOURCE: SIGNIFICANT CHANGE UP
TOTAL CELLS COUNTED, SPINAL FLUID: 7 CELLS — SIGNIFICANT CHANGE UP
TUBE TYPE: SIGNIFICANT CHANGE UP
WBC # BLD: 6.34 K/UL — SIGNIFICANT CHANGE UP (ref 3.8–10.5)
WBC # BLD: 7.11 K/UL — SIGNIFICANT CHANGE UP (ref 3.8–10.5)
WBC # FLD AUTO: 6.34 K/UL — SIGNIFICANT CHANGE UP (ref 3.8–10.5)
WBC # FLD AUTO: 7.11 K/UL — SIGNIFICANT CHANGE UP (ref 3.8–10.5)

## 2023-01-04 PROCEDURE — 99232 SBSQ HOSP IP/OBS MODERATE 35: CPT | Mod: GC

## 2023-01-04 PROCEDURE — 62270 DX LMBR SPI PNXR: CPT | Mod: GC

## 2023-01-04 PROCEDURE — 88108 CYTOPATH CONCENTRATE TECH: CPT | Mod: 26

## 2023-01-04 RX ORDER — HEPARIN SODIUM 5000 [USP'U]/ML
INJECTION INTRAVENOUS; SUBCUTANEOUS
Qty: 25000 | Refills: 0 | Status: DISCONTINUED | OUTPATIENT
Start: 2023-01-04 | End: 2023-01-05

## 2023-01-04 RX ADMIN — LIDOCAINE 1 PATCH: 4 CREAM TOPICAL at 18:31

## 2023-01-04 RX ADMIN — Medication 1 TABLET(S): at 17:45

## 2023-01-04 RX ADMIN — CHLORHEXIDINE GLUCONATE 1 APPLICATION(S): 213 SOLUTION TOPICAL at 18:31

## 2023-01-04 RX ADMIN — HEPARIN SODIUM 1400 UNIT(S)/HR: 5000 INJECTION INTRAVENOUS; SUBCUTANEOUS at 02:03

## 2023-01-04 RX ADMIN — Medication 81 MILLIGRAM(S): at 17:45

## 2023-01-04 RX ADMIN — LIDOCAINE 1 PATCH: 4 CREAM TOPICAL at 17:46

## 2023-01-04 RX ADMIN — LACOSAMIDE 120 MILLIGRAM(S): 50 TABLET ORAL at 17:43

## 2023-01-04 RX ADMIN — LACOSAMIDE 120 MILLIGRAM(S): 50 TABLET ORAL at 05:16

## 2023-01-04 RX ADMIN — TRAMADOL HYDROCHLORIDE 25 MILLIGRAM(S): 50 TABLET ORAL at 05:42

## 2023-01-04 RX ADMIN — HEPARIN SODIUM 1800 UNIT(S)/HR: 5000 INJECTION INTRAVENOUS; SUBCUTANEOUS at 16:30

## 2023-01-04 RX ADMIN — LEVETIRACETAM 400 MILLIGRAM(S): 250 TABLET, FILM COATED ORAL at 17:43

## 2023-01-04 RX ADMIN — ATORVASTATIN CALCIUM 40 MILLIGRAM(S): 80 TABLET, FILM COATED ORAL at 22:18

## 2023-01-04 RX ADMIN — HEPARIN SODIUM 1800 UNIT(S)/HR: 5000 INJECTION INTRAVENOUS; SUBCUTANEOUS at 20:44

## 2023-01-04 RX ADMIN — HEPARIN SODIUM 1800 UNIT(S)/HR: 5000 INJECTION INTRAVENOUS; SUBCUTANEOUS at 19:12

## 2023-01-04 RX ADMIN — LEVETIRACETAM 400 MILLIGRAM(S): 250 TABLET, FILM COATED ORAL at 05:17

## 2023-01-04 RX ADMIN — HEPARIN SODIUM 1800 UNIT(S)/HR: 5000 INJECTION INTRAVENOUS; SUBCUTANEOUS at 23:02

## 2023-01-04 NOTE — CHART NOTE - NSCHARTNOTEFT_GEN_A_CORE
Had discussion with Neurologist Dr. Forde regarding further work up for patient. Will wait for results of LP before proceeding with possible LN biopsy of pelvic lymph nodes. Had discussion with radiology who noted increase in size of pelvic LN from 7 x 8mm to 1.2cm x 8.0mm with an increase in the number of more prominent lymph nodes as well. Appreciate GI recs and agree with work up    Lukasz Trotter MD, PGY-4  Rheumatology Fellow  Reachable on TEAMS

## 2023-01-04 NOTE — PROGRESS NOTE ADULT - SUBJECTIVE AND OBJECTIVE BOX
Name of Patient : TAMI DUNHAM  MRN: [id number]  Date of visit: 01-04-23       Subjective: Patient seen and examined. No new events except as noted.   calm  doingokay     REVIEW OF SYSTEMS:    CONSTITUTIONAL: No weakness, fevers or chills  EYES/ENT: No visual changes;  No vertigo or throat pain   NECK: No pain or stiffness  RESPIRATORY: No cough, wheezing, hemoptysis; No shortness of breath  CARDIOVASCULAR: No chest pain or palpitations  GASTROINTESTINAL: No abdominal or epigastric pain. No nausea, vomiting, or hematemesis; No diarrhea or constipation. No melena or hematochezia.  GENITOURINARY: No dysuria, frequency or hematuria  NEUROLOGICAL: No numbness or weakness  SKIN: No itching, burning, rashes, or lesions   All other review of systems is negative unless indicated above.    MEDICATIONS:  MEDICATIONS  (STANDING):  aspirin enteric coated 81 milliGRAM(s) Oral daily  atorvastatin 40 milliGRAM(s) Oral at bedtime  bisacodyl Suppository 10 milliGRAM(s) Rectal daily  chlorhexidine 2% Cloths 1 Application(s) Topical daily  dextrose 50% Injectable 25 Gram(s) IV Push once  dextrose 50% Injectable 12.5 Gram(s) IV Push once  dextrose 50% Injectable 25 Gram(s) IV Push once  dextrose Oral Gel 15 Gram(s) Oral once  diphtheria/tetanus/pertussis (acellular) Vaccine (Adacel) 0.5 milliLiter(s) IntraMuscular once  glucagon  Injectable 1 milliGRAM(s) IntraMuscular once  heparin  Infusion.  Unit(s)/Hr (18 mL/Hr) IV Continuous <Continuous>  lacosamide IVPB 100 milliGRAM(s) IV Intermittent every 12 hours  levETIRAcetam  IVPB 1500 milliGRAM(s) IV Intermittent every 12 hours  lidocaine   4% Patch 1 Patch Transdermal every 24 hours  multivitamin 1 Tablet(s) Oral daily      PHYSICAL EXAM:  T(C): 36.7 (01-04-23 @ 12:02), Max: 37.1 (01-03-23 @ 21:17)  HR: 69 (01-04-23 @ 12:02) (69 - 80)  BP: 112/78 (01-04-23 @ 12:02) (108/67 - 114/68)  RR: 17 (01-04-23 @ 12:02) (17 - 19)  SpO2: 100% (01-04-23 @ 12:02) (97% - 100%)  Wt(kg): --  I&O's Summary        Appearance: Normal	  HEENT:  PERRLA   Lymphatic: No lymphadenopathy   Cardiovascular: Normal S1 S2, no JVD  Respiratory: normal effort , clear  Gastrointestinal:  Soft, Non-tender  Skin: No rashes,  warm to touch  Psychiatry:  Mood & affect appropriate  Musculuskeletal: No edema      All labs, Imaging and EKGs personally reviewed                           11.3   7.11  )-----------( 190      ( 04 Jan 2023 06:02 )             34.8               01-04    139  |  103  |  14  ----------------------------<  98  3.8   |  25  |  0.77    Ca    8.7      04 Jan 2023 06:02  Phos  3.8     01-04  Mg     1.90     01-04    TPro  6.6  /  Alb  3.1<L>  /  TBili  0.4  /  DBili  x   /  AST  22  /  ALT  25  /  AlkPhos  89  01-04    PT/INR - ( 04 Jan 2023 06:02 )   PT: 11.7 sec;   INR: 1.01 ratio         PTT - ( 04 Jan 2023 15:43 )  PTT:32.2 sec           < from: MR Lumbar Spine w/wo IV Cont (01.03.23 @ 12:29) >  INTERPRETATION:  CLINICAL INFORMATION: Foot drop    MRI of the lumbar spine was performed using sagittal T1, T2 and STIR   sequence.    Mild loss of the normal lumbar lordosis is seen.    The vertebral body height and alignment appear normal    Disc desiccation is seen involving the lower thoracic and lumbar spine   region. These findings are secondary to chronic degenerative changes.    T12-L1: Normal    L1-2: Normal    L2-3: Normal    L3-4: Mild disc bulge is seen. No significant compromise of the spinal   canal or either neural foramen    L4-5: Disc bulge is identified. There is abnormal T2 prolongation   associated with the central portion of this disc bulge, this is likely   compatible with an annular fissure. Minimal effacement of ventral thecal   sac is seen. Bilateral hypertrophic facet joint changes. No significant   compromise of the spinal canal.    L5-S1: Normal    The conus ends at L2 and appears normal    Evaluation of the paraspinal soft tissues appear normal    IMPRESSION: Degenerative changes as described above.

## 2023-01-04 NOTE — PROCEDURE NOTE - ADDITIONAL PROCEDURE DETAILS
Critically ill pt requiring PIV access for medical management and/or pressor support. Under US guidance identified Right upper vein, and successfully placed 20g x 6cm arrow extend dwell angiocath into vessel. Placement confirmed s/p with ultrasound and catheter determined to be in patent lumen of vein. Pt tolerated well w/o complication.
A lumbar puncture was performed under direct supervison. The back was prepped and draped in sterile fashion. Landmarks were identified. (The area was infiltrated with 1% Lidocaine.) A (22) gauge  spinal needle was inserted between L4 and L5. Approximately (20) mL of (clear) CSF was obtained The needle was withdrawn and bandage applied. There were no complications and the patient tolerated the procedure well.
Critically ill pt requiring PIV access for medical management and/or pressor support. Under US guidance identified left upper arm basilic vein, and successfully placed 20g x 6cm arrow extend dwell angiocath into vessel. Placement confirmed s/p with ultrasound and catheter determined to be in patent lumen of vein. Pt tolerated well w/o complication.

## 2023-01-04 NOTE — PROCEDURE NOTE - NSINFORMCONSENT_GEN_A_CORE
Benefits, risks, and possible complications of procedure explained to patient/caregiver who verbalized understanding and gave written consent.
This was an emergent procedure.
access for pressor/This was an emergent procedure.

## 2023-01-04 NOTE — PROGRESS NOTE ADULT - SUBJECTIVE AND OBJECTIVE BOX
Neurology Progress Note    S: Patient seen and examined ,    +_ covid precuations +DVT     Medication:  MEDICATIONS  (STANDING):  aspirin enteric coated 81 milliGRAM(s) Oral daily  atorvastatin 40 milliGRAM(s) Oral at bedtime  bisacodyl Suppository 10 milliGRAM(s) Rectal daily  chlorhexidine 2% Cloths 1 Application(s) Topical daily  dextrose 50% Injectable 25 Gram(s) IV Push once  dextrose 50% Injectable 12.5 Gram(s) IV Push once  dextrose 50% Injectable 25 Gram(s) IV Push once  dextrose Oral Gel 15 Gram(s) Oral once  diphtheria/tetanus/pertussis (acellular) Vaccine (Adacel) 0.5 milliLiter(s) IntraMuscular once  glucagon  Injectable 1 milliGRAM(s) IntraMuscular once  lacosamide IVPB 100 milliGRAM(s) IV Intermittent every 12 hours  levETIRAcetam  IVPB 1500 milliGRAM(s) IV Intermittent every 12 hours  lidocaine   4% Patch 1 Patch Transdermal every 24 hours  multivitamin 1 Tablet(s) Oral daily    MEDICATIONS  (PRN):  phenazopyridine 100 milliGRAM(s) Oral every 8 hours PRN dysuria  traMADol 25 milliGRAM(s) Oral every 6 hours PRN Moderate Pain (4 - 6)         Vitals:    Vital Signs Last 24 Hrs  T(C): 36.7 (04 Jan 2023 05:15), Max: 37.1 (03 Jan 2023 21:17)  T(F): 98 (04 Jan 2023 05:15), Max: 98.7 (03 Jan 2023 21:17)  HR: 80 (04 Jan 2023 05:15) (74 - 85)  BP: 108/67 (04 Jan 2023 05:15) (108/67 - 116/68)  BP(mean): --  RR: 17 (04 Jan 2023 05:15) (17 - 19)  SpO2: 98% (04 Jan 2023 05:15) (97% - 98%)    Parameters below as of 04 Jan 2023 05:15  Patient On (Oxygen Delivery Method): room air        General Exam:   General Appearance: Appropriately dressed and in no acute distress       Head: Normocephalic, atraumatic and no dysmorphic features  Ear, Nose, and Throat: Moist mucous membranes    CVS: S1S2+  Resp: No SOB, no wheeze or rhonchi  Abd: soft NTND  Extremities: No edema, no cyanosis  Skin: No bruises, no rashes     Neurological Exam:    Mental Status: Awake, Oriented x 2-3, able to follow simple and complex verbal commands. answers questions appropriately, able to name, repeat and recall. no aphasia, mild dysarthria (tongue bite)   Cranial Nerves: EOMI, PERRLA, VFF, V1-V3 sensation intact, no facial asymmetry, tongue midline, hearing intact B/L, shoulder shrug appropriate, SCM/trap intact.   Motor: Moving uppers > lowers. uppers at least 4+-5/5 ; lowers R>L 3-4/5   Sensation: intact to LT and PP   Reflexes: 1+ throughout at biceps, brachioradialis, triceps, patellars and ankles bilaterally and equal. No clonus. R toe and L toe were both downgoing.  Coordination: no dysmetria   Gait: unable     I personally reviewed the below data/images/labs:  CBC Full  -  ( 04 Jan 2023 06:02 )  WBC Count : 7.11 K/uL  RBC Count : 3.79 M/uL  Hemoglobin : 11.3 g/dL  Hematocrit : 34.8 %  Platelet Count - Automated : 190 K/uL  Mean Cell Volume : 91.8 fL  Mean Cell Hemoglobin : 29.8 pg  Mean Cell Hemoglobin Concentration : 32.5 gm/dL  Auto Neutrophil # : x  Auto Lymphocyte # : x  Auto Monocyte # : x  Auto Eosinophil # : x  Auto Basophil # : x  Auto Neutrophil % : x  Auto Lymphocyte % : x  Auto Monocyte % : x  Auto Eosinophil % : x  Auto Basophil % : x      01-04    139  |  103  |  14  ----------------------------<  98  3.8   |  25  |  0.77    Ca    8.7      04 Jan 2023 06:02  Phos  3.8     01-04  Mg     1.90     01-04    TPro  6.6  /  Alb  3.1<L>  /  TBili  0.4  /  DBili  x   /  AST  22  /  ALT  25  /  AlkPhos  89  01-04    < from: CT Head No Cont (12.02.22 @ 20:27) >    ACC: [id number] EXAM:  CT CERVICAL SPINE                        ACC: [id number] EXAM:  CT MAXILLOFACIAL                        ACC: [id number] EXAM:  CT BRAIN                          PROCEDURE DATE:  12/02/2022        INTERPRETATION:  CLINICAL INDICATION: Trauma.    Technique: Noncontrast axial CT of the head, facial bones, and cervical   spine was performed. 3-D reformats of the facial bones was obtained.   Coronal and sagittal reformats were obtained.      COMPARISON: None.    FINDINGS:  Head CT:  The ventricles and sulci are within normal limits. 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 There is no intraparenchymal hematoma, mass effect   or midline shift. No abnormal extra-axial fluid collections or   hemorrhages are present.    There is swelling of the left lateral scalp. The calvarium is intact.   There are scattered mucosal inflammatory changes in the paranasal sinuses.      Cervical spine CT:  Alignment ismaintained. Vertebral bodies are normal in height, without   evidence of fracture or dislocation. Prevertebral soft tissues are within   normal limits without soft tissue swelling or hematoma.    Intervertebral discs are intact. Neural foramina and spinal canal are   intact.    The visualized lung apices are within normal limits.      Facial bone CT:    No acute facial fracture.    There are scattered mucosal inflammatory changes in the paranasal   sinuses. Mastoid air cells are clear.    Soft tissues appear unremarkable.        IMPRESSION:  CT HEAD: No acute abnormality.  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  CT CERVICAL SPINE: No acute abnormality  CT FACIAL BONE: No acute abnormality    --- End of Report ---       DELGADO IVAN MD; Attending Radiologist  This document has been electronically signed. Dec  2 2022  9:02PM    < end of copied text >  < from: CT Lumbar Spine No Cont (12.02.22 @ 20:27) >    ACC: [id number] EXAM:  CT LUMBAR SPINE                        ACC: [id number] EXAM:  CT THORACIC SPINE                          PROCEDURE DATE:  12/02/2022          INTERPRETATION:  CT thoracic and lumbar spine without IV contrast    CLINICAL INFORMATION:  Trauma  Back pain, fracture.    TECHNIQUE:  Contiguous axial 2 mm sections were obtained through the   thoracic and lumbar spine using a single helical acquisition.     Additional 2 mm sagittal and coronal reconstructions of the spine were   obtained. These additional reformatted images were used to evaluate the   spine for alignment, vertebral fractures and the integrity of the the   posterior elements.   This scan was performed using automatic exposure   control (radiation dose reduction software) to obtain a diagnostic image   quality scan with patient dose as low as reasonably achievable.    FINDINGS:   No prior similar studies are available for review    Thoracic and lumbar vertebral body heights are maintained. No vertebral   fracture is seen. No destructive bone lesion is found.  Alignment is   preserved.  Facet joints appear intact and aligned.    Thoracic and lumbar intervertebral disc spaces appear intact.   Degenerative disc disease and spondylosis is noted at T6-T7 throughL4-5   with loss of disc height and associated degenerative endplate changes.   Mild disc bulges at L2-3 through L4-5 flatten the ventral thecal sac and   narrow the BILATERAL neural foramina.    No paraspinal mass is recognized.  Paraspinal soft tissues appear intact.      IMPRESSION:  Degenerative disc disease and spondylosis is noted at T6-T7   through L4-5 with loss of disc height and associated degenerative   endplate changes. Mild disc bulges at L2-3 through L4-5 flatten the   ventral thecal sac and narrow the BILATERAL neural foramina   No   vertebral fracture is recognized.    --- End of Report ---       ANGIE ESCOBAR MD; Attending Radiologist  This document has been electronically signed. Dec  2 2022  8:55PM    < end of copied text >    EEG Classification / Summary:  Abnormal EEG in a comatose patient due to diffuse suppression gradually improving to discontinuous background, with right hemispheric relative attenuation/suppression. No epileptiform abnormalities or seizures are captured.    Clinical Impression:  Severe diffuse cerebral dysfunction that gradually improves is nonspecific in etiology. In this case, it may be related to sedating medication (propofol) with improvement after decreasing and stopping the medication.  Right hemispheric focal cerebral dysfunction can be structural or functional in etiology.      12/7  Clinical Impression:  -Occasional runs of right frontal lateralized periodic discharges (LPDs) at up to 1.3 Hz indicate a potentially epileptogenic focus in the right frontal region and are on the ictal-interictal continuum.  -A pattern on the ictal-interictal continuum is a pattern that does not qualify as an electrographic seizure or electrographic status epilepticus, but there is a reasonable chance that it may be contributing to impaired alertness, causing other clinical symptoms, and/or contributing to neuronal injury.  -Right hemispheric relative attenuation indicates right hemispheric focal cerebral dysfunction, which can be structural or functional in etiology.  -Rare left frontal epileptiform discharges indicate a potentially epileptogenic focus in this reigon  -Severe diffuse slowing and GRDA indicate severe diffuse cerebral dysfunction of nonspecific etiology.  -No electrographic seizures are captured.     < from: MR Head w/wo IV Cont (12.09.22 @ 19:54) >    ACC: [id number] EXAM:  MR BRAIN WAW IC                          PROCEDURE DATE:  12/09/2022          INTERPRETATION:  CLINICAL INDICATION: CVA, found unresponsive at home      Magnetic resonance imaging of the brain was carried out with transaxial   SPGR, FLAIR, fast spin echo T2 weighted images, axial susceptibility   weighted series, diffusion weighted series and sagittal T1 weighted   series on a 1.5 Maritza magnet. Post contrast axial, coronal and sagittal   T1 weighted images were obtained. 10cc of Gadavist were intravenously   injected, 0 cc were discarded.      Comparison is made with the prior brain CT of 12/5/2022 and MRI 11/5/2022.    Mild ventricular and sulcal prominence is consistent with moderate   atrophy for the patient's age. No acute hemorrhage is identified. There   is a focus of hemosiderin within the alexis which is calcified on CT.   Scattered additional foci of hemosiderin deposition are identified in the   left frontal subcortical white matter and right occipital cortex is   nonspecific but may be related to multiple cavernoma is or hypertension.    There is a tiny focus of diffusion restriction in the right frontal   subcortical white matter and right centrum semiovale which are unchanged   since the prior exam and may represent subacute infarcts. Multiplicity   infarcts may be related to hypercoagulable state or cardioembolic events.    After contrast administration there is normal intracranial vascular   enhancement. No abnormal parenchymal or leptomeningeal enhancement.      IMPRESSION: Atrophy for the patient's age. Right frontal subcortical and   right centrum semiovale punctate infarcts are unchanged since 11/5/2022   and likely represent subacute infarcts. No abnormal enhancement. Focus of   calcification in the alexis with old hemosiderin. A few additional   scattered foci of hemosiderin deposition are unchanged.    --- End of Report ---     < from: MR Head w/wo IV Cont (12.29.22 @ 13:39) >    ACC: [id number] EXAM:  MR BRAIN WAW IC                          PROCEDURE DATE:  12/29/2022          INTERPRETATION:  CLINICAL INFORMATION: Follow-up study for infarct seen   on prior MR 12/9/2022. Patient initially presented to the hospital after   being found unresponsive.    TECHNIQUE: MRI of the brain was performed with and without contrast.   Sagittal and axial T1, axial T2, FLAIR, SWI, diffusion-weighted images   and an ADC map were obtained.    9.5 cc of Gadavist was injected, with 0.5 cc discarded.    COMPARISON: MR brain 12/9/2022    FINDINGS:    Previously seen foci of diffusion restriction in the right frontal   subcortical white matter and right centrum semiovale no longer restricted   diffusion and represent chronic infarcts. No new areas of infarction are   identified.    Foci of susceptibility artifact in the right occipital and right parietal   lobe, unchanged. No acute intracranial hemorrhage. No mass effect or   midline shift.    Areas of contrast enhancement in the bilateral putamen (12-18).   Enhancement of the alexis is also noted.    Moderate prominence of the sulci and ventricles..    There are foci of T2/FLAIR signal hyperintensity within the hemispheric   white matter, which are nonspecific but likely related tosequelae of   microvascular disease.    No abnormal extra-axial fluid collections are present.    Major flow-voids at the base of the brain follow expected course and   contour.    The calvarium is intact. Right ethmoid sinus mucosal thickening.    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 No new areas of infarct are identified. Unchanged scattered   foci of hemosiderin.    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   Differential diagnosis includes infection, sarcoidosis, lymphoma and   CLIPPERS (chronic lymphocytic inflammation with pontine perivascular   enhancement responsive to steroids).    --- End of Report ---         ACRMEN KONG MD; Resident Radiologist  This document has been electronically signed.  TANVIR LUNA MD; Attending Radiologist  This document has been electronically signed. Dec 29 2022  5:08PM    < end of copied text >  < from: MR Lower Ext Joint No Cont, Left (01.03.23 @ 10:14) >  N:    1.  Normal appearance of left deep peroneal and tibial nerves at the   level of the ankle.  2.  Acute on chronic denervation edema of left intrinsic hindfoot   musculature.  3.  Acute on chronic left plantar fasciitis with nonacute grade 2   sprain/1.3 cm low to moderate grade partial-thickness tear at the   calcaneal origin of the central band.  4.  Additional lower grade and chronic findings as detailed in the body   section of this report.    --- End of Report ---       < from: MR Lumbar Spine w/wo IV Cont (01.03.23 @ 12:29) >    ACC: [id number] EXAM:  MR SPINE LUMBAR WAW IC                          PROCEDURE DATE:  01/03/2023          INTERPRETATION:  CLINICAL INFORMATION: Foot drop    MRI of the lumbar spine was performed using sagittal T1, T2 and STIR   sequence.    Mild loss of the normal lumbar lordosis is seen.    The vertebral body height and alignment appear normal    Disc desiccation is seen involving the lower thoracic and lumbar spine   region. These findings are secondary to chronic degenerative changes.    T12-L1: Normal    L1-2: Normal    L2-3: Normal    L3-4: Mild disc bulge is seen. No significant compromise of the spinal   canal or either neural foramen    L4-5: Disc bulge is identified. There is abnormal T2 prolongation   associated with the central portion of this disc bulge, this is likely   compatible with an annular fissure. Minimal effacement of ventral thecal   sac is seen. Bilateral hypertrophic facet joint changes. No significant   compromise of the spinal canal.    L5-S1: Normal    The conus ends at L2 and appears normal    Evaluation of the paraspinal soft tissues appear normal    IMPRESSION: Degenerative changes as described above.    --- End of Report ---            TA DANIEL MD; Attending Radiologist  This document has been electronically signed. Gerardo  3 2023  1:13PM    < end of copied text >

## 2023-01-04 NOTE — PROGRESS NOTE ADULT - SUBJECTIVE AND OBJECTIVE BOX
Subjective: Patient seen and examined. No new events except as noted.     REVIEW OF SYSTEMS:    CONSTITUTIONAL:+ weakness, fevers or chills  EYES/ENT: No visual changes;  No vertigo or throat pain   NECK: No pain or stiffness  RESPIRATORY: No cough, wheezing, hemoptysis; No shortness of breath  CARDIOVASCULAR: No chest pain or palpitations  GASTROINTESTINAL: No abdominal or epigastric pain. No nausea, vomiting, or hematemesis; No diarrhea or constipation. No melena or hematochezia.  GENITOURINARY: No dysuria, frequency or hematuria  NEUROLOGICAL: No numbness or weakness  SKIN: No itching, burning, rashes, or lesions   All other review of systems is negative unless indicated above.    MEDICATIONS:  MEDICATIONS  (STANDING):  aspirin enteric coated 81 milliGRAM(s) Oral daily  atorvastatin 40 milliGRAM(s) Oral at bedtime  bisacodyl Suppository 10 milliGRAM(s) Rectal daily  chlorhexidine 2% Cloths 1 Application(s) Topical daily  dextrose 50% Injectable 25 Gram(s) IV Push once  dextrose 50% Injectable 12.5 Gram(s) IV Push once  dextrose 50% Injectable 25 Gram(s) IV Push once  dextrose Oral Gel 15 Gram(s) Oral once  diphtheria/tetanus/pertussis (acellular) Vaccine (Adacel) 0.5 milliLiter(s) IntraMuscular once  glucagon  Injectable 1 milliGRAM(s) IntraMuscular once  lacosamide IVPB 100 milliGRAM(s) IV Intermittent every 12 hours  levETIRAcetam  IVPB 1500 milliGRAM(s) IV Intermittent every 12 hours  lidocaine   4% Patch 1 Patch Transdermal every 24 hours  multivitamin 1 Tablet(s) Oral daily      PHYSICAL EXAM:  T(C): 36.7 (01-04-23 @ 05:15), Max: 37.1 (01-03-23 @ 21:17)  HR: 80 (01-04-23 @ 05:15) (74 - 85)  BP: 108/67 (01-04-23 @ 05:15) (108/67 - 116/68)  RR: 17 (01-04-23 @ 05:15) (17 - 19)  SpO2: 98% (01-04-23 @ 05:15) (97% - 98%)  Wt(kg): --  I&O's Summary        Appearance: NAD	  HEENT:   Dry oral mucosa, PERRL, EOMI	  Lymphatic: No lymphadenopathy , no edema  Cardiovascular: Normal S1 S2, No JVD, No murmurs , Peripheral pulses palpable 2+ bilaterally  Respiratory: Lungs clear to auscultation, normal effort 	  Gastrointestinal:  Soft, Non-tender, + BS	  Skin: No rashes, No ecchymoses, No cyanosis, warm to touch  Musculoskeletal: Normal range of motion, normal strength  Psychiatry:  Mood & affect appropriate  Ext: No edema      LABS:    CARDIAC MARKERS:                                11.3   7.11  )-----------( 190      ( 04 Jan 2023 06:02 )             34.8     01-04    139  |  103  |  14  ----------------------------<  98  3.8   |  25  |  0.77    Ca    8.7      04 Jan 2023 06:02  Phos  3.8     01-04  Mg     1.90     01-04    TPro  6.6  /  Alb  3.1<L>  /  TBili  0.4  /  DBili  x   /  AST  22  /  ALT  25  /  AlkPhos  89  01-04    proBNP:   Lipid Profile:   HgA1c:   TSH:             TELEMETRY: 	    ECG:  	  RADIOLOGY:   DIAGNOSTIC TESTING:  [ ] Echocardiogram:  [ ]  Catheterization:  [ ] Stress Test:    OTHER:

## 2023-01-04 NOTE — CONSULT NOTE ADULT - ASSESSMENT
Estiven Simeon is a 52-year-old  male presented with PMHx of TIAs (2011, 2013), CVAs x3 (2/2022, 8/3/2022, 11/5/2022) w/residual expressive aphasia on Eliquis, dyslipidemia and questionable ASD/PFO admitted after patient was found unresponsive at home on 12/2/2022 with a prolonged hospital course as outlined below/ GI consulted for consideration of a colonoscopy for further evaluation of rectal thickening noted on imaging and workup of possible colon cancer.     #Question thickening versus underdistention of the rectum  Pt incidentally found to have question thickening versus underdistention of the rectum on CT of the [place] for further workup of new findings for leptomeningeal disease noted on MRI. Pt without any underlying GI symptoms including n/v/abdominal pain or ongoing diarrhea. No prior c-scope in the past. CT findings are overall relatively non-specific although can attempt colonoscopy to r/o any underlying occult colon cancer accounting for his symptoms. Pending endoscopy schedule and clinical course can attempt colonoscopy on Friday 1/6/2023 for further evaluation of the above.     Recommendations:  -Please start on clear liquid diet on Thursday 1/5/2023 with plan for tentative colonoscopy on Friday 1/6/2023 pending endoscopy schedule  -Ok to continue ASA. Please hold Plavix if cleared for medical perspective to allow for biopsies if indicated.   -Maintain two large bore IV, active T&S    All recommendations are tentative until note is attested by attending.     Be Sullivan, PGY-4  Gastroenterology/Hepatology Fellow  Available on [employer] Teams   [phone] (Long Range Pager)  [phone] (Short Range Pager [facility])    After 5pm, please contact the on-call GI fellow. [phone]   Estiven Simeon is a 52-year-old  male presented with PMHx of TIAs (2011, 2013), CVAs x3 (2/2022, 8/3/2022, 11/5/2022) w/residual expressive aphasia on Eliquis, dyslipidemia and questionable ASD/PFO admitted after patient was found unresponsive at home on 12/2/2022 with a prolonged hospital course as outlined below/ GI consulted for consideration of a colonoscopy for further evaluation of rectal thickening noted on imaging and workup of possible colon cancer.     #Question thickening versus underdistention of the rectum  Pt incidentally found to have question thickening versus underdistention of the rectum on CT of the [facility] for further workup of new findings for leptomeningeal disease noted on MRI. Pt without any underlying GI symptoms including n/v/abdominal pain or ongoing diarrhea. No prior c-scope in the past. CT findings are overall relatively non-specific although can attempt colonoscopy to r/o any underlying occult colon cancer accounting for his symptoms.     Recommendations:  -Please start on clear liquid diet on Thursday 1/5/2023 with plan for tentative colonoscopy on Friday 1/6/2023 pending endoscopy schedule  -Ok to continue ASA. If heparin drip restarted, please plan to hold 6 hours prior to procedure if no absolute contraindication  -Maintain two large bore IV, active T&S    All recommendations are tentative until note is attested by attending.     Be Sullivan, PGY-4  Gastroenterology/Hepatology Fellow  Available on [employer] Teams   [phone] (Long Range Pager)  [phone] (Short Range Pager [facility])    After 5pm, please contact the on-call GI fellow. [phone]

## 2023-01-04 NOTE — PROGRESS NOTE ADULT - ASSESSMENT
52-year-old  M known to me from outpatient setting with  TIAs (2011, 2013), Strokes x3 (02/2022 s/p tPA, 8/3/2022 s/p tPA, 11/5/2022 with residual expressive aphasia) on Eliquis, was on testosterone outpatient stopped after last stroke, HLD BIBEMS after being found unresponsive  Temp 105.4 on arrival tachy and /110. 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   CT cervical spine and maxillofacial scans negative.  CT chest, abdomen, and pelvis w/ contrast concerning for possible partial SBO without transition point. Surgery consulted in ED, no acute surgical intervention at this time.   CT thoracic and lumbar spine showing degenerative disc disease T6-T7 through L4-L5 and mild disc bulges at L2-L3 through L4-L5 that flatten the ventral thecal sac and narrowing of the bilateral neural foramina.  MRI 11/2022: R centrum semiovale and R posterior frontal infarcts   takes adderall at home   + rhabdo  EEG no seizures   + transaminitis   CTH 12/5 stable   outpatient hypercoag workup neg   12/5 extubated then reintibuated for seizure activity and tx back to ICU   EEG this AM 12/5 with only slowing   EEG 12/6 slowing but no seizures   spoke with Pippa Conti (friend) who states after he was taken off the testosterone he ordered a mail order testosterone supplement, unclear if he started it yet, unclear what this was  12/7 EEG no electrogtraphic seizures captured but R LPDs, rare L frontal discharges, severe GRDA.   12/8 EEG improved.  extubated. following some commands   12/9 moving uppers> lowers   12/11 AAOx2 uppers 4-5/5; not moving LLE well   MRI brain neg for new stroke   s/p L knee tap arthrocentesis  by ortho on 12/12   spoke with friend pippa conti - she counted his adderrall and didn't take extra tables. did find ashwagandha and red wood (bottle was sealed) supplement in his apartment   s/p LP 12/5 --> CSF glucose and protein WNL.  will f/u remaining studies. non infectious appearing --> paraneoplastic was neg   o/e AAOx2, slurred but 2/2 tongue bite   + transaminitis   more alert  12/20 neuro exam improving AAOx2-3   c/o L ankle pain in addition to knee  + COVID   MRI brain 12/29: chronic R frontal and R centrum semiovale infarcts unchanged.  more conspicuous and newer bilateral lentiform nucleus and pontine enhancement of unclear etiology. ddx CLIPPERS, sarcoid, lymphoma vs infection.  (reviewed [facility] neuro radiology Dr. fried)   LE doppler L DVT   MRI ankle. : acute on chronic edema left intrinsic hindfoot musculature.  chronic plantar fascitis.   MRI L spine with degenerative changes   family hx of susac in cousin    Impression:   1) AMS of unclear etiology, possible now seizure, related to adderral withdrawal? 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 had MIMI was question of PFO but not found. s/p ILR   3) bilateral lentiform nucleus and pontine enhacement     - f/u MRI L spine ordered and MR Lower extremity --> doesn't really explain foot drop.  will try to get EMG but not always available at [facility] and may not .   - agree with bx if able for tissue pathology   - CT C A P --> neg.  --> LE doppler L DVT   - unclear of what to make of new MRI findings from 12/29, doubt related to covid as was present minimally on prior MRI.   lower suspicion for CLIPPERS.  would workup for lymphoma and sarcoid at this point.  if no etiology found would treat with high dose steroids 500mg BID x 3-5 days.  may need to consider repeat LP as well.  f/u with heme/onc as well as rheumatology   - covid precuations    -  L ankle imaging --. podiatry.   - resume AC s/p LP when able given L knee --> eliquis 5mg BID restarted but now changed to heparin drip   - possible MRCP planning for Monday. GI f/u.  elevated LFTs --> LFTs improved--> downtrending . MRCP deferred   - ortho for L knee s/p tap / arthrocentesis   -  Vimpat 100mg BID    - keppra 1500mg BID.   - fever on arrival, treatred initially for meningitis.  was on abx for aspiration PNA.  >LP done--> non  infectious   - IVF  - CPK elevated. trend improving   -   endocrine workup/eval   - there was some concern outpatient he may have a mixed  connective tissue disorder   - statin therapy for secondary stroke prevention when LFTS and CPK improved   - telemetry  - PT/OT/SS/SLP, OOBC  - check FS, glucose control <180  - GI/DVT ppx  - Thank you for allowing me to participate in the care of this patient. Call with questions.   - spoke with friend at bedside, [place] 12/11   - spoke with Pippa Conti at [phone] for more collateral info and to provide update. spoke again 12/12 over phone. spoke 12/19, 1/3  spoke with parents at bedside 12/20   d/c planning MUSA bender now covid auth good until 12/31    Braxton Forde MD  Vascular Neurology  Office: [phone]

## 2023-01-04 NOTE — CONSULT NOTE ADULT - SUBJECTIVE AND OBJECTIVE BOX
HPI:  Estiven Simeon is a 52-year-old  male presented with PMHx of TIAs (2011, 2013), CVAs x3 (2/2022, 8/3/2022, 11/5/2022) w/residual expressive aphasia on Eliquis, dyslipidemia and questionable ASD/PFO admitted after patient was found unresponsive at home on 12/2/2022 with a prolonged hospital course as outlined below/ GI consulted for consideration of a colonoscopy for further evaluation of rectal thickening noted on imaging and workup of possible colon cancer.     Patient initially presented on 12/1 after being found at home by 2 friends grunting and minimally responsive. At the ER, patient was found covered with dried blood in his mouth, and multiple ecchymosis in his arms, face, chest. His VS as remarkable for fever 105.4, tachycardia 130s, /110 mmHg, and tachypnea of 30. Patient was intubated for airway protection and started on empiric antibiotic. The CT head showed old calcification in mid alexis seen on prior studies concerning for calcified cavernoma. CT thoracic and lumbar spine showing degenerative disc disease T6-T7 through L4-L5 and mild disc bulges at L2-L3 through L4-L5 that flatten the ventral thecal sac and narrowing of the bilateral neural foramina. Workup this admission inlcuding EEG was performed and show no seizure and only moderate to severe nonspecific diffuse or multifocal cerebral dysfunction. TTE was performed and no PFO was noticed and EF 57%. His CPK 8720 and patient was treated as rhabdomyolysis On 12/5/22, patient had a RR w/tongue laceration w/excess blood and posturing like movement. 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  Repeat EEG on 12/7/22 showed electrographic seizures captured but R LPDs, rare L frontal discharges, severe GRDA. Patient remained with fever and LP was considered. On 12/8/22 patient was extubated and started to following commands. On 12/12 patient had arthrocentesis of left knee by ortho on 12/12/22 which technically showed blood. LP was performed on 12/5 showed glucose and protein normal, no infection and negative for paraneoplastic encephalitis. Per friend patient was ashwagandha. 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 Hospital course c/b L foot w/numbness and tingling which was recently noticed with MRI  with degenrtaive changees. Hospital course was c/b DVT while on Eliquis for which patient was transitioned to heparin gtt. Patient also underoging numerous LPs fopr further workup of tehaove.  For further workup of the above, CT of the AP was obtained to r/o systemic disease and notable for     Allergies:  No Known Allergies      Home Medications:  Adderall 15 mg oral tablet: 15 milligram(s) orally once a day (02 Dec 2022 23:10)  apixaban 5 mg oral tablet: 1 tab(s) orally every 12 hours restart 11/23 Am (02 Dec 2022 23:10)  aspirin 81 mg oral delayed release tablet: 1 tab(s) orally once a day (02 Dec 2022 23:10)  atorvastatin 40 mg oral tablet: 1 tab(s) orally once a day (02 Dec 2022 23:46)      Hospital Medications:  aspirin enteric coated 81 milliGRAM(s) Oral daily  atorvastatin 40 milliGRAM(s) Oral at bedtime  bisacodyl Suppository 10 milliGRAM(s) Rectal daily  chlorhexidine 2% Cloths 1 Application(s) Topical daily  dextrose 50% Injectable 25 Gram(s) IV Push once  dextrose 50% Injectable 12.5 Gram(s) IV Push once  dextrose 50% Injectable 25 Gram(s) IV Push once  dextrose Oral Gel 15 Gram(s) Oral once  diphtheria/tetanus/pertussis (acellular) Vaccine (Adacel) 0.5 milliLiter(s) IntraMuscular once  glucagon  Injectable 1 milliGRAM(s) IntraMuscular once  heparin  Infusion.  Unit(s)/Hr IV Continuous <Continuous>  lacosamide IVPB 100 milliGRAM(s) IV Intermittent every 12 hours  levETIRAcetam  IVPB 1500 milliGRAM(s) IV Intermittent every 12 hours  lidocaine   4% Patch 1 Patch Transdermal every 24 hours  multivitamin 1 Tablet(s) Oral daily  phenazopyridine 100 milliGRAM(s) Oral every 8 hours PRN  traMADol 25 milliGRAM(s) Oral every 6 hours PRN      PMHX/PSHX:    History of TIAs  CVA (cerebrovascular accident)  HLD (hyperlipidemia)  Vertigo  Unresponsiveness      Family history:  No family hx of CRC     Social History:   Tob: Denies  EtOH: Denies  Illicit Drugs: Denies    ROS: Complete and normal except as mentioned above    PHYSICAL EXAM:   GENERAL:  No acute distress  HEENT:  NCAT, no scleral icterus   CHEST:  no respiratory distress  HEART:  Regular rate and rhythm  ABDOMEN:  Soft, non-tender, non-distended, normoactive bowel sounds  EXTREMITIES: No edema  NEURO:  Alert and oriented x 3    Vital Signs:  Vital Signs Last 24 Hrs  T(C): 36.7 (04 Jan 2023 12:02), Max: 37.1 (03 Jan 2023 21:17)  T(F): 98.1 (04 Jan 2023 12:02), Max: 98.7 (03 Jan 2023 21:17)  HR: 69 (04 Jan 2023 12:02) (69 - 85)  BP: 112/78 (04 Jan 2023 12:02) (108/67 - 116/68)  BP(mean): --  RR: 17 (04 Jan 2023 12:02) (17 - 19)  SpO2: 100% (04 Jan 2023 12:02) (97% - 100%)    Parameters below as of 04 Jan 2023 12:02  Patient On (Oxygen Delivery Method): room air      Daily     Daily     LABS:                        11.3   7.11  )-----------( 190      ( 04 Jan 2023 06:02 )             34.8     Mean Cell Volume: 91.8 fL (01-04-23 @ 06:02)    01-04    139  |  103  |  14  ----------------------------<  98  3.8   |  25  |  0.77    Ca    8.7      04 Jan 2023 06:02  Phos  3.8     01-04  Mg     1.90     01-04    TPro  6.6  /  Alb  3.1<L>  /  TBili  0.4  /  DBili  x   /  AST  22  /  ALT  25  /  AlkPhos  89  01-04    LIVER FUNCTIONS - ( 04 Jan 2023 06:02 )  Alb: 3.1 g/dL / Pro: 6.6 g/dL / ALK PHOS: 89 U/L / ALT: 25 U/L / AST: 22 U/L / GGT: x           PT/INR - ( 04 Jan 2023 06:02 )   PT: 11.7 sec;   INR: 1.01 ratio         PTT - ( 04 Jan 2023 06:02 )  PTT:57.0 sec                            11.3   7.11  )-----------( 190      ( 04 Jan 2023 06:02 )             34.8                         11.3   5.81  )-----------( 206      ( 03 Jan 2023 06:05 )             35.0                         11.2   5.84  )-----------( 220      ( 02 Jan 2023 06:30 )             34.2       Imaging:  < from: CT Abdomen and Pelvis w/ IV Cont (01.01.23 @ 19:33) >    PROCEDURE:  CT of the Chest, Abdomen and Pelvis was performed.  Sagittal and coronal reformats were performed.    FINDINGS:  CHEST:  LUNGS AND LARGE AIRWAYS: Patent central airways. 3 mm right upper lobe   nodule (2:93) and 4 mm left lower lobesubpleural nodule (2:75).   Triangular-shaped left fissural nodule, likely lymph node. Mild right   lower lobe tree-in-bud opacities, likely distal mucoid impacted airways.   Bilateral lower lobe dependent atelectasis.  PLEURA: No pleural effusion.  VESSELS: Within normal limits.  HEART: Heart size is normal. No pericardial effusion.  MEDIASTINUM AND LUCIANA: No lymphadenopathy.  CHEST WALL AND LOWER NECK: Left chest wall loop recorder.    ABDOMEN AND PELVIS:  LIVER: Within normal limits.  BILE DUCTS: Normal caliber.  GALLBLADDER: Within normal limits.  SPLEEN: Within normal limits.  PANCREAS: Within normal limits.  ADRENALS: Within normal limits.  KIDNEYS/URETERS: Within normal limits.    BLADDER: Within normal limits.  REPRODUCTIVE ORGANS: Mildly enlarged prostate gland.    BOWEL: No bowel obstruction. Appendix is normal. Question thickening   versus underdistention of the rectum. Minimal perirectal stranding.   Recommend direct visualization if not recently performed.  PERITONEUM: No ascites.  VESSELS: Within normal limits.  RETROPERITONEUM/LYMPH NODES: Prominent left external iliac nodes, for   reference node measuring 1.2 x 0.8 cm (2:270).  ABDOMINAL WALL: Fat-containing left inguinal hernia. Small fat-containing   umbilical hernia.  BONES: Degenerative changes. No aggressive osseous lesion.    IMPRESSION:  Question thickening versus underdistention of the rectum, direct   visualization if not recently performed.    Subcentimeter pelvic lymph nodes.    < end of copied text >           HPI:  Estiven Simeon is a 52-year-old  male presented with PMHx of TIAs (2011, 2013), CVAs x3 (2/2022, 8/3/2022, 11/5/2022) w/residual expressive aphasia on Eliquis, dyslipidemia and questionable ASD/PFO admitted after patient was found unresponsive at home on 12/2/2022 with a prolonged hospital course as outlined below/ GI consulted for consideration of a colonoscopy for further evaluation of rectal thickening noted on imaging and workup of possible colon cancer.     Patient initially presented on 12/1 after being found at home by 2 friends grunting and minimally responsive. At the ER, patient was found covered with dried blood in his mouth, and multiple ecchymosis in his arms, face, chest. His VS as remarkable for fever 105.4, tachycardia 130s, /110 mmHg, and tachypnea of 30. Patient was intubated for airway protection and started on empiric antibiotic. The CT head showed old calcification in mid alexis seen on prior studies concerning for calcified cavernoma. CT thoracic and lumbar spine showing degenerative disc disease T6-T7 through L4-L5 and mild disc bulges at L2-L3 through L4-L5 that flatten the ventral thecal sac and narrowing of the bilateral neural foramina. Workup this admission inlcuding EEG was performed and show no seizure and only moderate to severe nonspecific diffuse or multifocal cerebral dysfunction. TTE was performed and no PFO was noticed and EF 57%. His CPK 8720 and patient was treated as rhabdomyolysis On 12/5/22, patient had a RR w/tongue laceration w/excess blood and posturing like movement. 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  Repeat EEG on 12/7/22 showed electrographic seizures captured but R LPDs, rare L frontal discharges, severe GRDA. Patient remained with fever and LP was considered. On 12/8/22 patient was extubated and started to following commands. On 12/12 patient had arthrocentesis of left knee by ortho on 12/12/22 which technically showed blood. LP was performed on 12/5 showed glucose and protein normal, no infection and negative for paraneoplastic encephalitis. Per friend patient was ashwagandha. 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 Hospital course c/b L foot w/numbness and tingling which was recently noticed with MRI  with degenrtaive changees. Hospital course was c/b DVT while on Eliquis for which patient was transitioned to heparin gtt. Patient also undergoing numerous LPs for further workup of the above.  For further workup of the above, CT of the AP was obtained to r/o systemic disease and notable for question thickening versus underdistention of the rectum new compared to prior. Given leptomeningeal enhancement and constellation of symptoms, primary team consulting for colonoscopy to r/o any colon cancer contributing to his symptoms. Outside of pain in his legs patient is without any  other acute complaints.     Allergies:  No Known Allergies      Home Medications:  Adderall 15 mg oral tablet: 15 milligram(s) orally once a day (02 Dec 2022 23:10)  apixaban 5 mg oral tablet: 1 tab(s) orally every 12 hours restart 11/23 Am (02 Dec 2022 23:10)  aspirin 81 mg oral delayed release tablet: 1 tab(s) orally once a day (02 Dec 2022 23:10)  atorvastatin 40 mg oral tablet: 1 tab(s) orally once a day (02 Dec 2022 23:46)      Hospital Medications:  aspirin enteric coated 81 milliGRAM(s) Oral daily  atorvastatin 40 milliGRAM(s) Oral at bedtime  bisacodyl Suppository 10 milliGRAM(s) Rectal daily  chlorhexidine 2% Cloths 1 Application(s) Topical daily  dextrose 50% Injectable 25 Gram(s) IV Push once  dextrose 50% Injectable 12.5 Gram(s) IV Push once  dextrose 50% Injectable 25 Gram(s) IV Push once  dextrose Oral Gel 15 Gram(s) Oral once  diphtheria/tetanus/pertussis (acellular) Vaccine (Adacel) 0.5 milliLiter(s) IntraMuscular once  glucagon  Injectable 1 milliGRAM(s) IntraMuscular once  heparin  Infusion.  Unit(s)/Hr IV Continuous <Continuous>  lacosamide IVPB 100 milliGRAM(s) IV Intermittent every 12 hours  levETIRAcetam  IVPB 1500 milliGRAM(s) IV Intermittent every 12 hours  lidocaine   4% Patch 1 Patch Transdermal every 24 hours  multivitamin 1 Tablet(s) Oral daily  phenazopyridine 100 milliGRAM(s) Oral every 8 hours PRN  traMADol 25 milliGRAM(s) Oral every 6 hours PRN      PMHX/PSHX:    History of TIAs  CVA (cerebrovascular accident)  HLD (hyperlipidemia)  Vertigo  Unresponsiveness      Family history:  No family hx of CRC     Social History:   Tob: Denies  EtOH: Denies  Illicit Drugs: Denies    ROS: Complete and normal except as mentioned above    PHYSICAL EXAM:   GENERAL:  No acute distress  HEENT:  NCAT, no scleral icterus   CHEST:  no respiratory distress  HEART:  Regular rate and rhythm  ABDOMEN:  Soft, non-tender, non-distended  EXTREMITIES: B/l pedal edema  NEURO:  Alert and oriented x 3    Vital Signs:  Vital Signs Last 24 Hrs  T(C): 36.7 (04 Jan 2023 12:02), Max: 37.1 (03 Jan 2023 21:17)  T(F): 98.1 (04 Jan 2023 12:02), Max: 98.7 (03 Jan 2023 21:17)  HR: 69 (04 Jan 2023 12:02) (69 - 85)  BP: 112/78 (04 Jan 2023 12:02) (108/67 - 116/68)  BP(mean): --  RR: 17 (04 Jan 2023 12:02) (17 - 19)  SpO2: 100% (04 Jan 2023 12:02) (97% - 100%)    Parameters below as of 04 Jan 2023 12:02  Patient On (Oxygen Delivery Method): room air      LABS:                        11.3   7.11  )-----------( 190      ( 04 Jan 2023 06:02 )             34.8     Mean Cell Volume: 91.8 fL (01-04-23 @ 06:02)    01-04    139  |  103  |  14  ----------------------------<  98  3.8   |  25  |  0.77    Ca    8.7      04 Jan 2023 06:02  Phos  3.8     01-04  Mg     1.90     01-04    TPro  6.6  /  Alb  3.1<L>  /  TBili  0.4  /  DBili  x   /  AST  22  /  ALT  25  /  AlkPhos  89  01-04    LIVER FUNCTIONS - ( 04 Jan 2023 06:02 )  Alb: 3.1 g/dL / Pro: 6.6 g/dL / ALK PHOS: 89 U/L / ALT: 25 U/L / AST: 22 U/L / GGT: x           PT/INR - ( 04 Jan 2023 06:02 )   PT: 11.7 sec;   INR: 1.01 ratio         PTT - ( 04 Jan 2023 06:02 )  PTT:57.0 sec                            11.3   7.11  )-----------( 190      ( 04 Jan 2023 06:02 )             34.8                         11.3   5.81  )-----------( 206      ( 03 Jan 2023 06:05 )             35.0                         11.2   5.84  )-----------( 220      ( 02 Jan 2023 06:30 )             34.2       Imaging:  < from: CT Abdomen and Pelvis w/ IV Cont (01.01.23 @ 19:33) >    PROCEDURE:  CT of the Chest, Abdomen and Pelvis was performed.  Sagittal and coronal reformats were performed.    FINDINGS:  CHEST:  LUNGS AND LARGE AIRWAYS: Patent central airways. 3 mm right upper lobe   nodule (2:93) and 4 mm left lower lobesubpleural nodule (2:75).   Triangular-shaped left fissural nodule, likely lymph node. Mild right   lower lobe tree-in-bud opacities, likely distal mucoid impacted airways.   Bilateral lower lobe dependent atelectasis.  PLEURA: No pleural effusion.  VESSELS: Within normal limits.  HEART: Heart size is normal. No pericardial effusion.  MEDIASTINUM AND LUCIANA: No lymphadenopathy.  CHEST WALL AND LOWER NECK: Left chest wall loop recorder.    ABDOMEN AND PELVIS:  LIVER: Within normal limits.  BILE DUCTS: Normal caliber.  GALLBLADDER: Within normal limits.  SPLEEN: Within normal limits.  PANCREAS: Within normal limits.  ADRENALS: Within normal limits.  KIDNEYS/URETERS: Within normal limits.    BLADDER: Within normal limits.  REPRODUCTIVE ORGANS: Mildly enlarged prostate gland.    BOWEL: No bowel obstruction. Appendix is normal. Question thickening   versus underdistention of the rectum. Minimal perirectal stranding.   Recommend direct visualization if not recently performed.  PERITONEUM: No ascites.  VESSELS: Within normal limits.  RETROPERITONEUM/LYMPH NODES: Prominent left external iliac nodes, for   reference node measuring 1.2 x 0.8 cm (2:270).  ABDOMINAL WALL: Fat-containing left inguinal hernia. Small fat-containing   umbilical hernia.  BONES: Degenerative changes. No aggressive osseous lesion.    IMPRESSION:  Question thickening versus underdistention of the rectum, direct   visualization if not recently performed.    Subcentimeter pelvic lymph nodes.    < end of copied text >           HPI:  Estiven Simeon is a 52-year-old  male presented with PMHx of TIAs (2011, 2013), CVAs x3 (2/2022, 8/3/2022, 11/5/2022) w/residual expressive aphasia on Eliquis, dyslipidemia and questionable ASD/PFO admitted after patient was found unresponsive at home on 12/2/2022 with a prolonged hospital course as outlined below/ GI consulted for consideration of a colonoscopy for further evaluation of rectal thickening noted on imaging and workup of possible colon cancer.     Patient initially presented on 12/1 after being found at home by 2 friends grunting and minimally responsive. At the ER, patient was found covered with dried blood in his mouth, and multiple ecchymosis in his arms, face, chest. His VS as remarkable for fever 105.4, tachycardia 130s, /110 mmHg, and tachypnea of 30. Patient was intubated for airway protection and started on empiric antibiotic. The CT head showed old calcification in mid alexis seen on prior studies concerning for calcified cavernoma. CT thoracic and lumbar spine showing degenerative disc disease T6-T7 through L4-L5 and mild disc bulges at L2-L3 through L4-L5 that flatten the ventral thecal sac and narrowing of the bilateral neural foramina. Workup this admission inlcuding EEG was performed and show no seizure and only moderate to severe nonspecific diffuse or multifocal cerebral dysfunction. TTE was performed and no PFO was noticed and EF 57%. His CPK 8720 and patient was treated as rhabdomyolysis On 12/5/22, patient had a RR w/tongue laceration w/excess blood and posturing like movement. 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  Repeat EEG on 12/7/22 showed electrographic seizures captured but R LPDs, rare L frontal discharges, severe GRDA. Patient remained with fever and LP was considered. On 12/8/22 patient was extubated and started to following commands. On 12/12 patient had arthrocentesis of left knee by ortho on 12/12/22 which technically showed blood. LP was performed on 12/5 showed glucose and protein normal, no infection and negative for paraneoplastic encephalitis. Per friend patient was ashwagandha. 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 Hospital course c/b L foot w/numbness and tingling which was recently noticed with MRI  with degenrtaive changees. Hospital course was c/b DVT while on Eliquis for which patient was transitioned to heparin gtt. Patient also undergoing numerous LPs for further workup of the above.  For further workup of the above, CT of the AP was obtained to r/o systemic disease and notable for question thickening versus underdistention of the rectum new compared to prior. Given leptomeningeal enhancement and constellation of symptoms, primary team consulting for colonoscopy to r/o any colon cancer contributing to his symptoms. Outside of pain in his legs patient is without any  other acute complaints.     Allergies:  No Known Allergies      Home Medications:  Adderall 15 mg oral tablet: 15 milligram(s) orally once a day (02 Dec 2022 23:10)  apixaban 5 mg oral tablet: 1 tab(s) orally every 12 hours restart 11/23 Am (02 Dec 2022 23:10)  aspirin 81 mg oral delayed release tablet: 1 tab(s) orally once a day (02 Dec 2022 23:10)  atorvastatin 40 mg oral tablet: 1 tab(s) orally once a day (02 Dec 2022 23:46)      Hospital Medications:  aspirin enteric coated 81 milliGRAM(s) Oral daily  atorvastatin 40 milliGRAM(s) Oral at bedtime  bisacodyl Suppository 10 milliGRAM(s) Rectal daily  chlorhexidine 2% Cloths 1 Application(s) Topical daily  dextrose 50% Injectable 25 Gram(s) IV Push once  dextrose 50% Injectable 12.5 Gram(s) IV Push once  dextrose 50% Injectable 25 Gram(s) IV Push once  dextrose Oral Gel 15 Gram(s) Oral once  diphtheria/tetanus/pertussis (acellular) Vaccine (Adacel) 0.5 milliLiter(s) IntraMuscular once  glucagon  Injectable 1 milliGRAM(s) IntraMuscular once  heparin  Infusion.  Unit(s)/Hr IV Continuous <Continuous>  lacosamide IVPB 100 milliGRAM(s) IV Intermittent every 12 hours  levETIRAcetam  IVPB 1500 milliGRAM(s) IV Intermittent every 12 hours  lidocaine   4% Patch 1 Patch Transdermal every 24 hours  multivitamin 1 Tablet(s) Oral daily  phenazopyridine 100 milliGRAM(s) Oral every 8 hours PRN  traMADol 25 milliGRAM(s) Oral every 6 hours PRN      PMHX/PSHX:    History of TIAs  CVA (cerebrovascular accident)  HLD (hyperlipidemia)  Vertigo  Unresponsiveness      Family history:  No family hx of CRC     Social History:   Tob: Denies  EtOH: Denies  Illicit Drugs: Denies    ROS: Complete and normal except as mentioned above    PHYSICAL EXAM:   GENERAL:  No acute distress, appears stated age  HEENT:  NCAT, no scleral icterus   CHEST:  no respiratory distress  HEART:  Regular rate and rhythm  ABDOMEN:  Soft, non-tender, non-distended  EXTREMITIES: B/l pedal edema  PSYCH:  Alert and oriented x 2-3, but somewhat slow to respond, calm and cooperative  NEURO: No tremor or asterixis noted    Vital Signs:  Vital Signs Last 24 Hrs  T(C): 36.7 (04 Jan 2023 12:02), Max: 37.1 (03 Jan 2023 21:17)  T(F): 98.1 (04 Jan 2023 12:02), Max: 98.7 (03 Jan 2023 21:17)  HR: 69 (04 Jan 2023 12:02) (69 - 85)  BP: 112/78 (04 Jan 2023 12:02) (108/67 - 116/68)  BP(mean): --  RR: 17 (04 Jan 2023 12:02) (17 - 19)  SpO2: 100% (04 Jan 2023 12:02) (97% - 100%)    Parameters below as of 04 Jan 2023 12:02  Patient On (Oxygen Delivery Method): room air      LABS:                        11.3   7.11  )-----------( 190      ( 04 Jan 2023 06:02 )             34.8     Mean Cell Volume: 91.8 fL (01-04-23 @ 06:02)    01-04    139  |  103  |  14  ----------------------------<  98  3.8   |  25  |  0.77    Ca    8.7      04 Jan 2023 06:02  Phos  3.8     01-04  Mg     1.90     01-04    TPro  6.6  /  Alb  3.1<L>  /  TBili  0.4  /  DBili  x   /  AST  22  /  ALT  25  /  AlkPhos  89  01-04    LIVER FUNCTIONS - ( 04 Jan 2023 06:02 )  Alb: 3.1 g/dL / Pro: 6.6 g/dL / ALK PHOS: 89 U/L / ALT: 25 U/L / AST: 22 U/L / GGT: x           PT/INR - ( 04 Jan 2023 06:02 )   PT: 11.7 sec;   INR: 1.01 ratio         PTT - ( 04 Jan 2023 06:02 )  PTT:57.0 sec                            11.3   7.11  )-----------( 190      ( 04 Jan 2023 06:02 )             34.8                         11.3   5.81  )-----------( 206      ( 03 Jan 2023 06:05 )             35.0                         11.2   5.84  )-----------( 220      ( 02 Jan 2023 06:30 )             34.2       Imaging:  < from: CT Abdomen and Pelvis w/ IV Cont (01.01.23 @ 19:33) >    PROCEDURE:  CT of the Chest, Abdomen and Pelvis was performed.  Sagittal and coronal reformats were performed.    FINDINGS:  CHEST:  LUNGS AND LARGE AIRWAYS: Patent central airways. 3 mm right upper lobe   nodule (2:93) and 4 mm left lower lobesubpleural nodule (2:75).   Triangular-shaped left fissural nodule, likely lymph node. Mild right   lower lobe tree-in-bud opacities, likely distal mucoid impacted airways.   Bilateral lower lobe dependent atelectasis.  PLEURA: No pleural effusion.  VESSELS: Within normal limits.  HEART: Heart size is normal. No pericardial effusion.  MEDIASTINUM AND LUCIANA: No lymphadenopathy.  CHEST WALL AND LOWER NECK: Left chest wall loop recorder.    ABDOMEN AND PELVIS:  LIVER: Within normal limits.  BILE DUCTS: Normal caliber.  GALLBLADDER: Within normal limits.  SPLEEN: Within normal limits.  PANCREAS: Within normal limits.  ADRENALS: Within normal limits.  KIDNEYS/URETERS: Within normal limits.    BLADDER: Within normal limits.  REPRODUCTIVE ORGANS: Mildly enlarged prostate gland.    BOWEL: No bowel obstruction. Appendix is normal. Question thickening   versus underdistention of the rectum. Minimal perirectal stranding.   Recommend direct visualization if not recently performed.  PERITONEUM: No ascites.  VESSELS: Within normal limits.  RETROPERITONEUM/LYMPH NODES: Prominent left external iliac nodes, for   reference node measuring 1.2 x 0.8 cm (2:270).  ABDOMINAL WALL: Fat-containing left inguinal hernia. Small fat-containing   umbilical hernia.  BONES: Degenerative changes. No aggressive osseous lesion.    IMPRESSION:  Question thickening versus underdistention of the rectum, direct   visualization if not recently performed.    Subcentimeter pelvic lymph nodes.    < end of copied text >

## 2023-01-04 NOTE — PROGRESS NOTE ADULT - ASSESSMENT
52-year-old male with a PMHx significant for TIAs (2011, 2013), CVAs x3 (02/2022 s/p tPA, 8/3/2022 s/p tPA, 11/5/2022 with residual expressive aphasia) on Eliquis, and HLD BIBEMS after being found unresponsive at home on the floor, last known well 12/1 at around noon. C-collar was placed. Patient was intubated in the ED for airway protection. CTH with no acute findings, vEEG with no identified seizures. Patient was weaned off pressors, extubated, and transitioned to floors 12/4/22. 12/5 am RRT called for seizure like activity with urinary incontinence, tongue biting, and R>L posturing, patient s/p ativan 2mg x3. Anesthesia called to RRT for intubation. MICU accepting patient for seizure like activity requiring intubation.      #AMS, Seizure like activity  EEG 12/7: concern for epileptiform activity; though EEG from 12/5 without such abnormalities.   - f/u neuro recs  - Patient found down and unresponsive at home on 12/2, last known well 12/1 at around noon. Intubated in ED 12/2, extubated in MICU 12/4  - RRT 12/5: seizure like acting with urinary incontinence, convulsions, and tongue biting; s/p ativan 2mg x3 with pauses in seizure activity following each; intubated for airway protection with rocuronium. Prolactin ~75   - CTH and CT cervical spine 12/2 negative for any acute pathologies. Tox screen on admission negative. 12/5: Stable exam from prior CT head, chronic microvascular ischemic changes, parenchymal loss, dystrophic calcification of central portion of alexis unchanged.  - Video EEG 12/3-12/4 without any seizure like activity, moderate to severe nonspecific diffuse or multifocal cerebral dysfunction  - c/w keppra 1500mg q12 maintenance, Keppra loaded 4.5g  - patient extubated again on 12/8th  -MRI noted, chronic CVA, no acute CVA noted     MRI noted , new findings for leptomeningeal disease, R/O sarcoid vs lymphoma   Hematology and rheum eval called  repeat scan per heme    LP   LE DVT study positive eliquis switched to heparin full AC for now         #CVAs/TIAs  - Hx of TIAs in 2011 and 2013, CVAs x3 in 02/22, 8/22, and 11/22 with residual expressive aphasia   - on Eliquis and Aspirin at home for hx of stroke  - PFO work up in past negative, no evidence of PFO on MIMI X2  - c/w ASA   - c/w heparin gtt, was on hold, resume eliquis   - Restart Statin when LFTs and CPK improve per neurology  - Being worked up for Mixed Connective Tissue Disease OP- f/u p-ANCA, c-ANCA/ [id number] negative  - Neuro recs appreciated  - repeat brain MRI noted   - LE pain, check MRI     Plan For repeat LP with cytology   GI for colon thickening seen on CT     # Hypernatremia  tredn Na level   renal eval appreciated  S/P hydraiton, trend Na level     #Thecal sac flattening   - CT thoracic and lumbar spine showing degenerative disc disease T6-T7 through L4-L5 and mild disc bulges at L2-L3 through L4-L5 that flatten the ventral thecal sac and narrowing of the bilateral neural foramina  - Will Monitor for now, will consider neuro/neurosurg evaluation in the future    #HLD  - atorvastatin on hold due to elevated CK and LFTs  - restart when possible for secondary stroke prevention    #?ASD/PFO  - Patient reportedly found to have a PFO in February 2022 during a stroke workup at [facility]. Patient presented for a PFO closure in November 2022. Notably, no PFO was found at the time and no intervention was performed by Dr. Lynn.    - TTE 11/5/22 EF 57% and grossly normal LV function  - MIMI performed bedside 12/5 1 of 2 studies (+) on bubble study (uploaded to [facility])  - elevated D d-andrew, Check LE DVT study posotve       #Rhabdomyolysis  - CK 8720 on admission, peaked at 15k, now downtrending  - DDx: prolonged downtime myositis v hyperthermia? (T 105.4 F) v sepsis v seizure induced    - L Ankle adn Knee pain, swelling  S/P Tap last week  cont to have pain and tenderness and swelling  Repeat imaging   Ortho follow up           # Elevated Winston level  CHeck Abd US noted    GI eval PRN   Trend LFTs , normalized, no need for MRCP       #Partial SBO - resolved  - CT chest, abdomen, and pelvis w/ contrast concerning for possible partial SBO without transition point.  - Surgery consulted, no acute intervention at this time  - 12/5 KUB - negative for ileus or SBO  - Hold TF for 12/7 for possible trial of extubation.  - otherwise diet per nutrition      #Leukocytosis  - worsening leukocytosis  - monitor for fever  - Ortho eval fro knee swelling   - Pan Cx  - LP by IR , follow up results         #Concern for sepsis  Unclear source, aspiration v less likely meningitis   BCx (12/2 NGTD repeat 12/5 NGTD) and UC 12/2 NGTD  - Patient initially presented with AMS, T 105.4, tachycardic, and tachypneic   - UA negative  - s/p vanco and zosyn x1 in ED 12/2, ceftriaxone 2g BID 12/3-12/4, vanco 12/3-12/4  - 12/7 DCed Vanc since BCx from 12/5 NGTD  - c/w zosyn for aspiration pna presumed. completed course, monitor   - ID eval appreciated  - febrile again, Ortho for knee asp      # ANemia  noted, no gross bleeding  type and screen  GI eval called   R/O malignancy in vie wof thickening of the colon

## 2023-01-04 NOTE — PROCEDURE NOTE - NSSITEPREP_SKIN_A_CORE
chlorhexidine/Adherence to aseptic technique: hand hygiene prior to donning barriers (gown, gloves), don cap and mask, sterile drape over patient
chlorhexidine
chlorhexidine/Adherence to aseptic technique: hand hygiene prior to donning barriers (gown, gloves), don cap and mask, sterile drape over patient

## 2023-01-04 NOTE — CHART NOTE - NSCHARTNOTEFT_GEN_A_CORE
Discussed with ortho in regards to patient's MRI LLE findings. No acute intervention while inpatient, recommended outpatient follow up with Dr. Conner Rios (foot surgeon). Also provided writer with contact information for Eric (orthotics specialist). Eric made aware of patient,  with plans to deliver nighttime dorsiflexion brace tomorrow 1/5. Discussed with ortho in regards to patient's MRI LLE findings. No acute intervention while inpatient, recommended outpatient follow up with Dr. Conner Rios (foot surgeon). Also provided writer with contact information for Eric (orthotics specialist). Eric made aware of patient,  with plans to deliver nighttime dorsiflexion brace tomorrow 1/5. Dr. Diez made aware.

## 2023-01-04 NOTE — CONSULT NOTE ADULT - CONSULT REASON
C-scope for rectal thickening and colon cancer w/u colonoscopy for rectal thickening and colon cancer w/u

## 2023-01-05 LAB
ANION GAP SERPL CALC-SCNC: 11 MMOL/L — SIGNIFICANT CHANGE UP (ref 7–14)
APTT BLD: 159 SEC — SIGNIFICANT CHANGE UP (ref 27–36.3)
APTT BLD: 162.5 SEC — CRITICAL HIGH (ref 27–36.3)
APTT BLD: 38.3 SEC — HIGH (ref 27–36.3)
BLD GP AB SCN SERPL QL: NEGATIVE — SIGNIFICANT CHANGE UP
BUN SERPL-MCNC: 12 MG/DL — SIGNIFICANT CHANGE UP (ref 7–23)
CALCIUM SERPL-MCNC: 8.7 MG/DL — SIGNIFICANT CHANGE UP (ref 8.4–10.5)
CHLORIDE SERPL-SCNC: 104 MMOL/L — SIGNIFICANT CHANGE UP (ref 98–107)
CO2 SERPL-SCNC: 24 MMOL/L — SIGNIFICANT CHANGE UP (ref 22–31)
CREAT SERPL-MCNC: 0.76 MG/DL — SIGNIFICANT CHANGE UP (ref 0.5–1.3)
CSF PCR RESULT: SIGNIFICANT CHANGE UP
EGFR: 108 ML/MIN/1.73M2 — SIGNIFICANT CHANGE UP
GLUCOSE SERPL-MCNC: 99 MG/DL — SIGNIFICANT CHANGE UP (ref 70–99)
HCT VFR BLD CALC: 36.1 % — LOW (ref 39–50)
HGB BLD-MCNC: 11.9 G/DL — LOW (ref 13–17)
LDH CSF L TO P-CCNC: 13 U/L — SIGNIFICANT CHANGE UP
LDH FLD-CCNC: 13 U/L — SIGNIFICANT CHANGE UP
MAGNESIUM SERPL-MCNC: 2.1 MG/DL — SIGNIFICANT CHANGE UP (ref 1.6–2.6)
MCHC RBC-ENTMCNC: 30.2 PG — SIGNIFICANT CHANGE UP (ref 27–34)
MCHC RBC-ENTMCNC: 33 GM/DL — SIGNIFICANT CHANGE UP (ref 32–36)
MCV RBC AUTO: 91.6 FL — SIGNIFICANT CHANGE UP (ref 80–100)
NON-GYNECOLOGICAL CYTOLOGY STUDY: SIGNIFICANT CHANGE UP
NRBC # BLD: 0 /100 WBCS — SIGNIFICANT CHANGE UP (ref 0–0)
NRBC # FLD: 0 K/UL — SIGNIFICANT CHANGE UP (ref 0–0)
PHOSPHATE SERPL-MCNC: 4.5 MG/DL — SIGNIFICANT CHANGE UP (ref 2.5–4.5)
PLATELET # BLD AUTO: 181 K/UL — SIGNIFICANT CHANGE UP (ref 150–400)
POTASSIUM SERPL-MCNC: 3.7 MMOL/L — SIGNIFICANT CHANGE UP (ref 3.5–5.3)
POTASSIUM SERPL-SCNC: 3.7 MMOL/L — SIGNIFICANT CHANGE UP (ref 3.5–5.3)
RBC # BLD: 3.94 M/UL — LOW (ref 4.2–5.8)
RBC # FLD: 14 % — SIGNIFICANT CHANGE UP (ref 10.3–14.5)
RH IG SCN BLD-IMP: NEGATIVE — SIGNIFICANT CHANGE UP
SODIUM SERPL-SCNC: 139 MMOL/L — SIGNIFICANT CHANGE UP (ref 135–145)
WBC # BLD: 6.09 K/UL — SIGNIFICANT CHANGE UP (ref 3.8–10.5)
WBC # FLD AUTO: 6.09 K/UL — SIGNIFICANT CHANGE UP (ref 3.8–10.5)

## 2023-01-05 PROCEDURE — 99232 SBSQ HOSP IP/OBS MODERATE 35: CPT | Mod: GC

## 2023-01-05 RX ORDER — TRAMADOL HYDROCHLORIDE 50 MG/1
25 TABLET ORAL EVERY 6 HOURS
Refills: 0 | Status: DISCONTINUED | OUTPATIENT
Start: 2023-01-05 | End: 2023-01-11

## 2023-01-05 RX ORDER — SOD SULF/SODIUM/NAHCO3/KCL/PEG
4000 SOLUTION, RECONSTITUTED, ORAL ORAL ONCE
Refills: 0 | Status: COMPLETED | OUTPATIENT
Start: 2023-01-05 | End: 2023-01-05

## 2023-01-05 RX ORDER — LACOSAMIDE 50 MG/1
100 TABLET ORAL EVERY 12 HOURS
Refills: 0 | Status: DISCONTINUED | OUTPATIENT
Start: 2023-01-05 | End: 2023-01-11

## 2023-01-05 RX ORDER — OXYCODONE HYDROCHLORIDE 5 MG/1
5 TABLET ORAL EVERY 6 HOURS
Refills: 0 | Status: DISCONTINUED | OUTPATIENT
Start: 2023-01-05 | End: 2023-01-11

## 2023-01-05 RX ADMIN — LACOSAMIDE 120 MILLIGRAM(S): 50 TABLET ORAL at 05:18

## 2023-01-05 RX ADMIN — OXYCODONE HYDROCHLORIDE 5 MILLIGRAM(S): 5 TABLET ORAL at 09:29

## 2023-01-05 RX ADMIN — LACOSAMIDE 120 MILLIGRAM(S): 50 TABLET ORAL at 19:02

## 2023-01-05 RX ADMIN — HEPARIN SODIUM 0 UNIT(S)/HR: 5000 INJECTION INTRAVENOUS; SUBCUTANEOUS at 22:30

## 2023-01-05 RX ADMIN — LEVETIRACETAM 400 MILLIGRAM(S): 250 TABLET, FILM COATED ORAL at 18:07

## 2023-01-05 RX ADMIN — LEVETIRACETAM 400 MILLIGRAM(S): 250 TABLET, FILM COATED ORAL at 06:14

## 2023-01-05 RX ADMIN — TRAMADOL HYDROCHLORIDE 25 MILLIGRAM(S): 50 TABLET ORAL at 13:58

## 2023-01-05 RX ADMIN — Medication 1 TABLET(S): at 12:07

## 2023-01-05 RX ADMIN — HEPARIN SODIUM 2200 UNIT(S)/HR: 5000 INJECTION INTRAVENOUS; SUBCUTANEOUS at 07:28

## 2023-01-05 RX ADMIN — Medication 108 MILLIGRAM(S): at 21:52

## 2023-01-05 RX ADMIN — Medication 81 MILLIGRAM(S): at 12:06

## 2023-01-05 RX ADMIN — TRAMADOL HYDROCHLORIDE 25 MILLIGRAM(S): 50 TABLET ORAL at 12:58

## 2023-01-05 RX ADMIN — CHLORHEXIDINE GLUCONATE 1 APPLICATION(S): 213 SOLUTION TOPICAL at 12:06

## 2023-01-05 RX ADMIN — ATORVASTATIN CALCIUM 40 MILLIGRAM(S): 80 TABLET, FILM COATED ORAL at 21:54

## 2023-01-05 RX ADMIN — HEPARIN SODIUM 1900 UNIT(S)/HR: 5000 INJECTION INTRAVENOUS; SUBCUTANEOUS at 19:25

## 2023-01-05 RX ADMIN — HEPARIN SODIUM 2200 UNIT(S)/HR: 5000 INJECTION INTRAVENOUS; SUBCUTANEOUS at 06:20

## 2023-01-05 RX ADMIN — HEPARIN SODIUM 0 UNIT(S)/HR: 5000 INJECTION INTRAVENOUS; SUBCUTANEOUS at 14:20

## 2023-01-05 RX ADMIN — LIDOCAINE 1 PATCH: 4 CREAM TOPICAL at 18:08

## 2023-01-05 RX ADMIN — HEPARIN SODIUM 1900 UNIT(S)/HR: 5000 INJECTION INTRAVENOUS; SUBCUTANEOUS at 15:28

## 2023-01-05 RX ADMIN — OXYCODONE HYDROCHLORIDE 5 MILLIGRAM(S): 5 TABLET ORAL at 10:29

## 2023-01-05 RX ADMIN — Medication 4000 MILLILITER(S): at 16:17

## 2023-01-05 RX ADMIN — HEPARIN SODIUM 2200 UNIT(S)/HR: 5000 INJECTION INTRAVENOUS; SUBCUTANEOUS at 09:46

## 2023-01-05 NOTE — PROGRESS NOTE ADULT - ATTENDING COMMENTS
#Abnormal CT with thickening vs underdistention of rectum  #TIAs, CVAs on Eliquis  #Leptomeningeal enhancement    --If patient remains optimized and otherwise medically stable, will tentatively plan for colonoscopy tomorrow for workup of underlying malignancy and to evaluate recent CT findings. Awaiting further input re: endoscopy schedule availability prior to scheduling.  --OK to continue ASA, management of heparin drip as outlined above

## 2023-01-05 NOTE — PROGRESS NOTE ADULT - SUBJECTIVE AND OBJECTIVE BOX
Patient is a 52y old  Male who presents with a chief complaint of Unresponsive (05 Jan 2023 10:36)    Pt seen this morning. He has no new complaints- main issue is still his foot drop.   s/p repeat LP     MEDICATIONS  (STANDING):  aspirin enteric coated 81 milliGRAM(s) Oral daily  atorvastatin 40 milliGRAM(s) Oral at bedtime  bisacodyl Suppository 10 milliGRAM(s) Rectal daily  chlorhexidine 2% Cloths 1 Application(s) Topical daily  dextrose 50% Injectable 25 Gram(s) IV Push once  dextrose 50% Injectable 12.5 Gram(s) IV Push once  dextrose 50% Injectable 25 Gram(s) IV Push once  dextrose Oral Gel 15 Gram(s) Oral once  diphtheria/tetanus/pertussis (acellular) Vaccine (Adacel) 0.5 milliLiter(s) IntraMuscular once  glucagon  Injectable 1 milliGRAM(s) IntraMuscular once  heparin  Infusion.  Unit(s)/Hr (18 mL/Hr) IV Continuous <Continuous>  lacosamide IVPB 100 milliGRAM(s) IV Intermittent every 12 hours  levETIRAcetam  IVPB 1500 milliGRAM(s) IV Intermittent every 12 hours  lidocaine   4% Patch 1 Patch Transdermal every 24 hours  multivitamin 1 Tablet(s) Oral daily    MEDICATIONS  (PRN):  oxyCODONE    IR 5 milliGRAM(s) Oral every 6 hours PRN moderate and severe pain  phenazopyridine 100 milliGRAM(s) Oral every 8 hours PRN dysuria  traMADol 25 milliGRAM(s) Oral every 6 hours PRN Moderate Pain (4 - 6)        Vital Signs Last 24 Hrs  T(C): 36.5 (05 Jan 2023 11:37), Max: 36.9 (04 Jan 2023 22:58)  T(F): 97.7 (05 Jan 2023 11:37), Max: 98.4 (04 Jan 2023 22:58)  HR: 74 (05 Jan 2023 11:37) (72 - 78)  BP: 118/92 (05 Jan 2023 11:37) (115/84 - 121/78)  BP(mean): --  RR: 17 (05 Jan 2023 11:37) (17 - 18)  SpO2: 99% (05 Jan 2023 11:37) (98% - 99%)    Parameters below as of 05 Jan 2023 11:37  Patient On (Oxygen Delivery Method): room air        PE  NAD  Awake, alert  Anicteric, MMM  No c/c/e  No rash grossly  pt unable to actively dorsiflex at left ankle                           11.9   6.09  )-----------( 181      ( 05 Jan 2023 05:29 )             36.1       01-05    139  |  104  |  12  ----------------------------<  99  3.7   |  24  |  0.76    Ca    8.7      05 Jan 2023 05:29  Phos  4.5     01-05  Mg     2.10     01-05    TPro  6.6  /  Alb  3.1<L>  /  TBili  0.4  /  DBili  x   /  AST  22  /  ALT  25  /  AlkPhos  89  01-04

## 2023-01-05 NOTE — PROGRESS NOTE ADULT - ASSESSMENT
52-year-old  M known to me from outpatient setting with  TIAs (2011, 2013), Strokes x3 (02/2022 s/p tPA, 8/3/2022 s/p tPA, 11/5/2022 with residual expressive aphasia) on Eliquis, was on testosterone outpatient stopped after last stroke, HLD BIBEMS after being found unresponsive  Temp 105.4 on arrival tachy and /110. 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   CT cervical spine and maxillofacial scans negative.  CT chest, abdomen, and pelvis w/ contrast concerning for possible partial SBO without transition point. Surgery consulted in ED, no acute surgical intervention at this time.   CT thoracic and lumbar spine showing degenerative disc disease T6-T7 through L4-L5 and mild disc bulges at L2-L3 through L4-L5 that flatten the ventral thecal sac and narrowing of the bilateral neural foramina.  MRI 11/2022: R centrum semiovale and R posterior frontal infarcts   takes adderall at home   + rhabdo  EEG no seizures   + transaminitis   CTH 12/5 stable   outpatient hypercoag workup neg   12/5 extubated then reintibuated for seizure activity and tx back to ICU   EEG this AM 12/5 with only slowing   EEG 12/6 slowing but no seizures   spoke with Pippa Conti (friend) who states after he was taken off the testosterone he ordered a mail order testosterone supplement, unclear if he started it yet, unclear what this was  12/7 EEG no electrogtraphic seizures captured but R LPDs, rare L frontal discharges, severe GRDA.   12/8 EEG improved.  extubated. following some commands   12/9 moving uppers> lowers   12/11 AAOx2 uppers 4-5/5; not moving LLE well   MRI brain neg for new stroke   s/p L knee tap arthrocentesis  by ortho on 12/12   spoke with friend pippa conti - she counted his adderrall and didn't take extra tables. did find ashwagandha and red wood (bottle was sealed) supplement in his apartment   s/p LP 12/5 --> CSF glucose and protein WNL.  will f/u remaining studies. non infectious appearing --> paraneoplastic was neg   o/e AAOx2, slurred but 2/2 tongue bite   + transaminitis   more alert  12/20 neuro exam improving AAOx2-3   c/o L ankle pain in addition to knee  + COVID   MRI brain 12/29: chronic R frontal and R centrum semiovale infarcts unchanged.  more conspicuous and newer bilateral lentiform nucleus and pontine enhancement of unclear etiology. ddx CLIPPERS, sarcoid, lymphoma vs infection.  (reviewed [facility] neuro radiology Dr. fried)   LE doppler L DVT   MRI ankle. : acute on chronic edema left intrinsic hindfoot musculature.  chronic plantar fascitis.   MRI L spine with degenerative changes   family hx of susac in cousin  Is/p LP 1/4 f/u flow and cytology     Impression:   1) AMS of unclear etiology, possible now seizure, related to adderral withdrawal? 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 had MIMI was question of PFO but not found. s/p ILR   3) bilateral lentiform nucleus and pontine enhacement     - s/p LP 1/4 f/u cytology, if neg would consider LN bx easier to bx than brain , spoke with rheum   - f/u MRI L spine ordered and MR Lower extremity --> doesn't really explain foot drop.  will try to get EMG but not always available at [facility] and may not .   - agree with bx if able for tissue pathology   - CT C A P --> neg.  --> LE doppler L DVT   - unclear of what to make of new MRI findings from 12/29, doubt related to covid as was present minimally on prior MRI.   lower suspicion for CLIPPERS.  would workup for lymphoma and sarcoid at this point.  if no etiology found would treat with high dose steroids 500mg BID x 3-5 days.  may need to consider repeat LP as well.  f/u with heme/onc as well as rheumatology   - covid precuations    -  L ankle imaging --. podiatry.   - resume AC s/p LP when able given L knee --> eliquis 5mg BID restarted but now changed to heparin drip   - possible MRCP planning for Monday. GI f/u.  elevated LFTs --> LFTs improved--> downtrending . MRCP deferred   - ortho for L knee s/p tap / arthrocentesis   -  Vimpat 100mg BID    - keppra 1500mg BID.   - fever on arrival, treatred initially for meningitis.  was on abx for aspiration PNA.  >LP done--> non  infectious   - IVF  - CPK elevated. trend improving   -   endocrine workup/eval   - there was some concern outpatient he may have a mixed  connective tissue disorder   - statin therapy for secondary stroke prevention when LFTS and CPK improved   - telemetry  - PT/OT/SS/SLP, OOBC  - check FS, glucose control <180  - GI/DVT ppx  - Thank you for allowing me to participate in the care of this patient. Call with questions.   - spoke with friend at bedside, Galilea 12/11   - spoke with Pippa Conti at [phone] for more collateral info and to provide update. spoke again 12/12 over phone. spoke 12/19, 1/3  spoke with parents at bedside 12/20        Braxton Forde MD  Vascular Neurology  Office: [phone]

## 2023-01-05 NOTE — PROGRESS NOTE ADULT - SUBJECTIVE AND OBJECTIVE BOX
Patient is a 52y old  Male who presents with a chief complaint of abnormal ct (05 Jan 2023 12:46)       INTERVAL HPI/OVERNIGHT EVENTS:  Patient seen and evaluated at bedside.  Pt is resting comfortable in NAD. Denies N/V/F/C.  Pain rated at X/10    Allergies    No Known Allergies    Intolerances        Vital Signs Last 24 Hrs  T(C): 36.5 (05 Jan 2023 11:37), Max: 36.9 (04 Jan 2023 22:58)  T(F): 97.7 (05 Jan 2023 11:37), Max: 98.4 (04 Jan 2023 22:58)  HR: 74 (05 Jan 2023 11:37) (72 - 78)  BP: 118/92 (05 Jan 2023 11:37) (115/84 - 121/78)  BP(mean): --  RR: 17 (05 Jan 2023 11:37) (17 - 18)  SpO2: 99% (05 Jan 2023 11:37) (98% - 99%)    Parameters below as of 05 Jan 2023 11:37  Patient On (Oxygen Delivery Method): room air        LABS:                        11.9   6.09  )-----------( 181      ( 05 Jan 2023 05:29 )             36.1     01-05    139  |  104  |  12  ----------------------------<  99  3.7   |  24  |  0.76    Ca    8.7      05 Jan 2023 05:29  Phos  4.5     01-05  Mg     2.10     01-05    TPro  6.6  /  Alb  3.1<L>  /  TBili  0.4  /  DBili  x   /  AST  22  /  ALT  25  /  AlkPhos  89  01-04    PT/INR - ( 04 Jan 2023 06:02 )   PT: 11.7 sec;   INR: 1.01 ratio         PTT - ( 05 Jan 2023 13:03 )  PTT:162.5 sec    CAPILLARY BLOOD GLUCOSE          Lower Extremity Physical Exam:  Vascular: DP/PT 2/4, B/L, CFT <3 seconds B/L, Temperature gradient WNL, B/L.   Neuro: Epicritic sensation intact right foot, diminished 0/4 ipswich light touch left foot  Musculoskeletal/Ortho: mild plantarflexed angle, reducible, 0/5 dorsiflexion left side and 4/5 eversion/inversion/plantarflexion - right is WNL  Skin: eschar left lateral fifth metatarsal base, no open lesion grossly, no signs of infection no erythema nor edema bilateral    RADIOLOGY & ADDITIONAL TESTS:

## 2023-01-05 NOTE — PROGRESS NOTE ADULT - SUBJECTIVE AND OBJECTIVE BOX
Gastroenterology Progress Note    Interval Events:   Pt continues to complain ongoing pain worse on the left side. Denies any ongoing GI complaints including nausea, vomiting, abdominal pain or loose stoola.     Allergies:  No Known Allergies      Hospital Medications:  aspirin enteric coated 81 milliGRAM(s) Oral daily  atorvastatin 40 milliGRAM(s) Oral at bedtime  bisacodyl Suppository 10 milliGRAM(s) Rectal daily  chlorhexidine 2% Cloths 1 Application(s) Topical daily  dextrose 50% Injectable 25 Gram(s) IV Push once  dextrose 50% Injectable 12.5 Gram(s) IV Push once  dextrose 50% Injectable 25 Gram(s) IV Push once  dextrose Oral Gel 15 Gram(s) Oral once  diphtheria/tetanus/pertussis (acellular) Vaccine (Adacel) 0.5 milliLiter(s) IntraMuscular once  glucagon  Injectable 1 milliGRAM(s) IntraMuscular once  heparin  Infusion.  Unit(s)/Hr IV Continuous <Continuous>  lacosamide IVPB 100 milliGRAM(s) IV Intermittent every 12 hours  levETIRAcetam  IVPB 1500 milliGRAM(s) IV Intermittent every 12 hours  lidocaine   4% Patch 1 Patch Transdermal every 24 hours  multivitamin 1 Tablet(s) Oral daily  oxyCODONE    IR 5 milliGRAM(s) Oral every 6 hours PRN  phenazopyridine 100 milliGRAM(s) Oral every 8 hours PRN  traMADol 25 milliGRAM(s) Oral every 6 hours PRN      ROS: 14 point ROS negative unless otherwise state in subjective    PHYSICAL EXAM:   Vital Signs:  Vital Signs Last 24 Hrs  T(C): 36.5 (05 Jan 2023 11:37), Max: 36.9 (04 Jan 2023 22:58)  T(F): 97.7 (05 Jan 2023 11:37), Max: 98.4 (04 Jan 2023 22:58)  HR: 74 (05 Jan 2023 11:37) (72 - 78)  BP: 118/92 (05 Jan 2023 11:37) (115/84 - 121/78)  BP(mean): --  RR: 17 (05 Jan 2023 11:37) (17 - 18)  SpO2: 99% (05 Jan 2023 11:37) (98% - 99%)    Parameters below as of 05 Jan 2023 11:37  Patient On (Oxygen Delivery Method): room air    GENERAL:  No acute distress  HEENT:  NCAT, no scleral icterus  CHEST: no resp distress  HEART:  RRR  ABDOMEN:  Soft, non-tender, non-distended, normoactive bowel sounds  EXTREMITIES: b/l trace edema  NEURO:  Alert and oriented x 3    LABS:                        11.9   6.09  )-----------( 181      ( 05 Jan 2023 05:29 )             36.1     Mean Cell Volume: 91.6 fL (01-05-23 @ 05:29)    01-05    139  |  104  |  12  ----------------------------<  99  3.7   |  24  |  0.76    Ca    8.7      05 Jan 2023 05:29  Phos  4.5     01-05  Mg     2.10     01-05    TPro  6.6  /  Alb  3.1<L>  /  TBili  0.4  /  DBili  x   /  AST  22  /  ALT  25  /  AlkPhos  89  01-04    LIVER FUNCTIONS - ( 04 Jan 2023 06:02 )  Alb: 3.1 g/dL / Pro: 6.6 g/dL / ALK PHOS: 89 U/L / ALT: 25 U/L / AST: 22 U/L / GGT: x           PT/INR - ( 04 Jan 2023 06:02 )   PT: 11.7 sec;   INR: 1.01 ratio    PTT - ( 05 Jan 2023 05:29 )  PTT:38.3 sec    Imaging:  < from: CT Abdomen and Pelvis w/ IV Cont (01.01.23 @ 19:33) >    PROCEDURE:  CT of the Chest, Abdomen and Pelvis was performed.  Sagittal and coronal reformats were performed.    FINDINGS:  CHEST:  LUNGS AND LARGE AIRWAYS: Patent central airways. 3 mm right upper lobe   nodule (2:93) and 4 mm left lower lobesubpleural nodule (2:75).   Triangular-shaped left fissural nodule, likely lymph node. Mild right   lower lobe tree-in-bud opacities, likely distal mucoid impacted airways.   Bilateral lower lobe dependent atelectasis.  PLEURA: No pleural effusion.  VESSELS: Within normal limits.  HEART: Heart size is normal. No pericardial effusion.  MEDIASTINUM AND LUCIANA: No lymphadenopathy.  CHEST WALL AND LOWER NECK: Left chest wall loop recorder.    ABDOMEN AND PELVIS:  LIVER: Within normal limits.  BILE DUCTS: Normal caliber.  GALLBLADDER: Within normal limits.  SPLEEN: Within normal limits.  PANCREAS: Within normal limits.  ADRENALS: Within normal limits.  KIDNEYS/URETERS: Within normal limits.    BLADDER: Within normal limits.  REPRODUCTIVE ORGANS: Mildly enlarged prostate gland.    BOWEL: No bowel obstruction. Appendix is normal. Question thickening   versus underdistention of the rectum. Minimal perirectal stranding.   Recommend direct visualization if not recently performed.  PERITONEUM: No ascites.  VESSELS: Within normal limits.  RETROPERITONEUM/LYMPH NODES: Prominent left external iliac nodes, for   reference node measuring 1.2 x 0.8 cm (2:270).  ABDOMINAL WALL: Fat-containing left inguinal hernia. Small fat-containing   umbilical hernia.  BONES: Degenerative changes. No aggressive osseous lesion.    IMPRESSION:  Question thickening versus underdistention of the rectum, direct   visualization if not recently performed.    Subcentimeter pelvic lymph nodes.    < end of copied text >           Gastroenterology Progress Note    Interval Events:   Pt continues to complain ongoing pain worse on the left side. Denies any ongoing GI complaints including nausea, vomiting, abdominal pain or loose stools.     Allergies:  No Known Allergies      Hospital Medications:  aspirin enteric coated 81 milliGRAM(s) Oral daily  atorvastatin 40 milliGRAM(s) Oral at bedtime  bisacodyl Suppository 10 milliGRAM(s) Rectal daily  chlorhexidine 2% Cloths 1 Application(s) Topical daily  dextrose 50% Injectable 25 Gram(s) IV Push once  dextrose 50% Injectable 12.5 Gram(s) IV Push once  dextrose 50% Injectable 25 Gram(s) IV Push once  dextrose Oral Gel 15 Gram(s) Oral once  diphtheria/tetanus/pertussis (acellular) Vaccine (Adacel) 0.5 milliLiter(s) IntraMuscular once  glucagon  Injectable 1 milliGRAM(s) IntraMuscular once  heparin  Infusion.  Unit(s)/Hr IV Continuous <Continuous>  lacosamide IVPB 100 milliGRAM(s) IV Intermittent every 12 hours  levETIRAcetam  IVPB 1500 milliGRAM(s) IV Intermittent every 12 hours  lidocaine   4% Patch 1 Patch Transdermal every 24 hours  multivitamin 1 Tablet(s) Oral daily  oxyCODONE    IR 5 milliGRAM(s) Oral every 6 hours PRN  phenazopyridine 100 milliGRAM(s) Oral every 8 hours PRN  traMADol 25 milliGRAM(s) Oral every 6 hours PRN      ROS: 14 point ROS negative unless otherwise state in subjective    PHYSICAL EXAM:   Vital Signs:  Vital Signs Last 24 Hrs  T(C): 36.5 (05 Jan 2023 11:37), Max: 36.9 (04 Jan 2023 22:58)  T(F): 97.7 (05 Jan 2023 11:37), Max: 98.4 (04 Jan 2023 22:58)  HR: 74 (05 Jan 2023 11:37) (72 - 78)  BP: 118/92 (05 Jan 2023 11:37) (115/84 - 121/78)  BP(mean): --  RR: 17 (05 Jan 2023 11:37) (17 - 18)  SpO2: 99% (05 Jan 2023 11:37) (98% - 99%)    Parameters below as of 05 Jan 2023 11:37  Patient On (Oxygen Delivery Method): room air    GENERAL:  No acute distress  HEENT:  NCAT, no scleral icterus  CHEST: no resp distress  HEART:  RRR  ABDOMEN:  Soft, non-tender, non-distended, normoactive bowel sounds  EXTREMITIES: b/l trace edema  NEURO:  Alert and oriented x 3    LABS:                        11.9   6.09  )-----------( 181      ( 05 Jan 2023 05:29 )             36.1     Mean Cell Volume: 91.6 fL (01-05-23 @ 05:29)    01-05    139  |  104  |  12  ----------------------------<  99  3.7   |  24  |  0.76    Ca    8.7      05 Jan 2023 05:29  Phos  4.5     01-05  Mg     2.10     01-05    TPro  6.6  /  Alb  3.1<L>  /  TBili  0.4  /  DBili  x   /  AST  22  /  ALT  25  /  AlkPhos  89  01-04    LIVER FUNCTIONS - ( 04 Jan 2023 06:02 )  Alb: 3.1 g/dL / Pro: 6.6 g/dL / ALK PHOS: 89 U/L / ALT: 25 U/L / AST: 22 U/L / GGT: x           PT/INR - ( 04 Jan 2023 06:02 )   PT: 11.7 sec;   INR: 1.01 ratio    PTT - ( 05 Jan 2023 05:29 )  PTT:38.3 sec    Imaging:  < from: CT Abdomen and Pelvis w/ IV Cont (01.01.23 @ 19:33) >    PROCEDURE:  CT of the Chest, Abdomen and Pelvis was performed.  Sagittal and coronal reformats were performed.    FINDINGS:  CHEST:  LUNGS AND LARGE AIRWAYS: Patent central airways. 3 mm right upper lobe   nodule (2:93) and 4 mm left lower lobesubpleural nodule (2:75).   Triangular-shaped left fissural nodule, likely lymph node. Mild right   lower lobe tree-in-bud opacities, likely distal mucoid impacted airways.   Bilateral lower lobe dependent atelectasis.  PLEURA: No pleural effusion.  VESSELS: Within normal limits.  HEART: Heart size is normal. No pericardial effusion.  MEDIASTINUM AND LUCIANA: No lymphadenopathy.  CHEST WALL AND LOWER NECK: Left chest wall loop recorder.    ABDOMEN AND PELVIS:  LIVER: Within normal limits.  BILE DUCTS: Normal caliber.  GALLBLADDER: Within normal limits.  SPLEEN: Within normal limits.  PANCREAS: Within normal limits.  ADRENALS: Within normal limits.  KIDNEYS/URETERS: Within normal limits.    BLADDER: Within normal limits.  REPRODUCTIVE ORGANS: Mildly enlarged prostate gland.    BOWEL: No bowel obstruction. Appendix is normal. Question thickening   versus underdistention of the rectum. Minimal perirectal stranding.   Recommend direct visualization if not recently performed.  PERITONEUM: No ascites.  VESSELS: Within normal limits.  RETROPERITONEUM/LYMPH NODES: Prominent left external iliac nodes, for   reference node measuring 1.2 x 0.8 cm (2:270).  ABDOMINAL WALL: Fat-containing left inguinal hernia. Small fat-containing   umbilical hernia.  BONES: Degenerative changes. No aggressive osseous lesion.    IMPRESSION:  Question thickening versus underdistention of the rectum, direct   visualization if not recently performed.    Subcentimeter pelvic lymph nodes.    < end of copied text >

## 2023-01-05 NOTE — PROGRESS NOTE ADULT - ASSESSMENT
52-year-old male with a history of TIAs (2011, 2013), CVAs x3 (2/2022, 8/3/2022, 11/5/2022) w/residual expressive aphasia on Eliquis, dyslipidemia and questionable ASD/PFO admitted after patient was found unresponsive at home on 12/2/2022, now improving mental status but MRI with bilateral lentiform nucleus and pontine enhancement.     Abnormal MRI findings-   -- Low suspicion for lymphoma as he has had a negative LP/flow cytometry on 12/15 and his mental status is improving without any intervention with chemo/immunotherapy.   -- Serum LDH is only 312.  -- With MRI findings/symptoms of foot drop, recommend repeat lumbar puncture. Neurology and podiatry following. s/p repeat LP 01/04, follow up cytology   -- GI eval noted- tentatively plan for colonoscopy 01/06 to evaluate CT findings of rectal thickening and increased pelvic lymph nodes     Anemia-  -- Not anemia on admission, likely related to blood draws.   -- No evidence of B12/folate, iron deficiency.     On eliquis for prior CVA history.    Will continue to follow.    Caro Dow PA-C  Hematology/Oncology  [facility]  [phone] (office)  [phone] (alt office)  Evenings and weekends please call MD on call or office

## 2023-01-05 NOTE — CHART NOTE - NSCHARTNOTEFT_GEN_A_CORE
Discussed with infection prevention ([id number]) patient's isolation will be discontinued tomorrow (day 11) as long as patient remains afebrile. Infection prevention to review the case in the morning (approx. 07:00) prior to patient's colonoscopy (tentative 08:00 tomorrow per GI).

## 2023-01-05 NOTE — PROGRESS NOTE ADULT - SUBJECTIVE AND OBJECTIVE BOX
Name of Patient : TAMI DUNHAM  MRN: [id number]  Date of visit: 01-05-23       Subjective: Patient seen and examined. No new events except as noted.   calm     REVIEW OF SYSTEMS:    CONSTITUTIONAL: No weakness, fevers or chills  EYES/ENT: No visual changes;  No vertigo or throat pain   NECK: No pain or stiffness  RESPIRATORY: No cough, wheezing, hemoptysis; No shortness of breath  CARDIOVASCULAR: No chest pain or palpitations  GASTROINTESTINAL: No abdominal or epigastric pain. No nausea, vomiting, or hematemesis; No diarrhea or constipation. No melena or hematochezia.  GENITOURINARY: No dysuria, frequency or hematuria  NEUROLOGICAL: No numbness or weakness  SKIN: No itching, burning, rashes, or lesions   All other review of systems is negative unless indicated above.    MEDICATIONS:  MEDICATIONS  (STANDING):  aspirin enteric coated 81 milliGRAM(s) Oral daily  atorvastatin 40 milliGRAM(s) Oral at bedtime  bisacodyl Suppository 10 milliGRAM(s) Rectal daily  chlorhexidine 2% Cloths 1 Application(s) Topical daily  dextrose 50% Injectable 25 Gram(s) IV Push once  dextrose 50% Injectable 12.5 Gram(s) IV Push once  dextrose 50% Injectable 25 Gram(s) IV Push once  dextrose Oral Gel 15 Gram(s) Oral once  diphtheria/tetanus/pertussis (acellular) Vaccine (Adacel) 0.5 milliLiter(s) IntraMuscular once  glucagon  Injectable 1 milliGRAM(s) IntraMuscular once  heparin  Infusion.  Unit(s)/Hr (18 mL/Hr) IV Continuous <Continuous>  lacosamide IVPB 100 milliGRAM(s) IV Intermittent every 12 hours  levETIRAcetam  IVPB 1500 milliGRAM(s) IV Intermittent every 12 hours  lidocaine   4% Patch 1 Patch Transdermal every 24 hours  methylPREDNISolone sodium succinate IVPB 500 milliGRAM(s) IV Intermittent two times a day  multivitamin 1 Tablet(s) Oral daily      PHYSICAL EXAM:  T(C): 36.5 (01-05-23 @ 11:37), Max: 36.9 (01-04-23 @ 22:58)  HR: 74 (01-05-23 @ 11:37) (72 - 78)  BP: 118/92 (01-05-23 @ 11:37) (115/84 - 121/78)  RR: 17 (01-05-23 @ 11:37) (17 - 18)  SpO2: 99% (01-05-23 @ 11:37) (98% - 99%)  Wt(kg): --  I&O's Summary        Appearance: Normal	  HEENT:  PERRLA   Lymphatic: No lymphadenopathy   Cardiovascular: Normal S1 S2, no JVD  Respiratory: normal effort , clear  Gastrointestinal:  Soft, Non-tender  Skin: No rashes,  warm to touch  Psychiatry:  Mood & affect appropriate  Musculuskeletal: No edema      All labs, Imaging and EKGs personally reviewed                           11.9   6.09  )-----------( 181      ( 05 Jan 2023 05:29 )             36.1               01-05    139  |  104  |  12  ----------------------------<  99  3.7   |  24  |  0.76    Ca    8.7      05 Jan 2023 05:29  Phos  4.5     01-05  Mg     2.10     01-05    TPro  6.6  /  Alb  3.1<L>  /  TBili  0.4  /  DBili  x   /  AST  22  /  ALT  25  /  AlkPhos  89  01-04    PT/INR - ( 04 Jan 2023 06:02 )   PT: 11.7 sec;   INR: 1.01 ratio         PTT - ( 05 Jan 2023 13:03 )  PTT:162.5 sec

## 2023-01-05 NOTE — PROGRESS NOTE ADULT - ASSESSMENT
Estiven Simeon is a 52-year-old  male presented with PMHx of TIAs (2011, 2013), CVAs x3 (2/2022, 8/3/2022, 11/5/2022) w/residual expressive aphasia on Eliquis, dyslipidemia and questionable ASD/PFO admitted after patient was found unresponsive at home on 12/2/2022 with a prolonged hospital course as outlined below/ GI consulted for consideration of a colonoscopy for further evaluation of rectal thickening noted on imaging and workup of possible colon cancer.     #Question thickening versus underdistention of the rectum  Pt incidentally found to have question thickening versus underdistention of the rectum on CT of the [facility] for further workup of new findings for leptomeningeal disease noted on MRI. Pt without any underlying GI symptoms including n/v/abdominal pain or ongoing diarrhea. No prior c-scope in the past. CT findings are overall relatively non-specific although can attempt colonoscopy to r/o any underlying occult colon cancer accounting for his symptoms.     Recommendations:  -Please continue with clear liquid diet today with plan for tentative colonoscopy on Friday 1/6/2023 (pt must be off C-19 precautions)  -Ok to continue ASA. If heparin drip restarted, please plan to hold 6 hours prior to procedure if no absolute contraindication (would hold at 3 AM on 1/6/2022)  -Maintain two large bore IV, active T&S    Instructions for colonoscopy  - please ensure golytely is completed (to be ordered by GI fellow)  - please ensure patient drinks entire prep  - please ensure morning labs are drawn at 2am, electrolytes repleted as necessary, and Hg>7    All recommendations are tentative until note is attested by attending.     Be Sullivan, PGY-4  Gastroenterology/Hepatology Fellow  Available on [employer] Teams   [phone] (Long Range Pager)  [phone] (Short Range Pager [facility])    After 5pm, please contact the on-call GI fellow. [phone]   Estiven Simeon is a 52-year-old  male presented with PMHx of TIAs (2011, 2013), CVAs x3 (2/2022, 8/3/2022, 11/5/2022) w/residual expressive aphasia on Eliquis, dyslipidemia and questionable ASD/PFO admitted after patient was found unresponsive at home on 12/2/2022 with a prolonged hospital course as outlined below/ GI consulted for consideration of a colonoscopy for further evaluation of rectal thickening noted on imaging and workup of possible colon cancer.     #Question thickening versus underdistention of the rectum  Pt incidentally found to have question thickening versus underdistention of the rectum on CT of the AP for further workup of new findings for leptomeningeal disease noted on MRI. Pt without any underlying GI symptoms including n/v/abdominal pain or ongoing diarrhea. No prior c-scope in the past. CT findings are overall relatively non-specific although can attempt colonoscopy to r/o any underlying occult colon cancer accounting for his symptoms.     Recommendations:  -Please continue with clear liquid diet today with plan for tentative colonoscopy on Friday 1/6/2023 (pt must be off C-19 precautions)  -Ok to continue ASA. If heparin drip restarted, please plan to hold 6 hours prior to procedure if no absolute contraindication (would hold at 12 AM on 1/6/2022)  -Maintain two large bore IV, active T&S    Instructions for colonoscopy  - please ensure golytely is completed (to be ordered by GI fellow)  - please ensure patient drinks entire prep  - please ensure morning labs are drawn at 2am, electrolytes repleted as necessary, and Hg>7    All recommendations are tentative until note is attested by attending.     Be Sullivan, PGY-4  Gastroenterology/Hepatology Fellow  Available on [employer] Teams   [phone] (Long Range Pager)  [phone] (Short Range Pager [facility])    After 5pm, please contact the on-call GI fellow. [phone]

## 2023-01-05 NOTE — PROGRESS NOTE ADULT - SUBJECTIVE AND OBJECTIVE BOX
Subjective: Patient seen and examined. No new events except as noted.   resting comfortably     REVIEW OF SYSTEMS:    CONSTITUTIONAL: No weakness, fevers or chills  EYES/ENT: No visual changes;  No vertigo or throat pain   NECK: No pain or stiffness  RESPIRATORY: No cough, wheezing, hemoptysis; No shortness of breath  CARDIOVASCULAR: No chest pain or palpitations  GASTROINTESTINAL: No abdominal or epigastric pain. No nausea, vomiting, or hematemesis; No diarrhea or constipation. No melena or hematochezia.  GENITOURINARY: No dysuria, frequency or hematuria  NEUROLOGICAL: No numbness or weakness  SKIN: No itching, burning, rashes, or lesions   All other review of systems is negative unless indicated above.    MEDICATIONS:  MEDICATIONS  (STANDING):  aspirin enteric coated 81 milliGRAM(s) Oral daily  atorvastatin 40 milliGRAM(s) Oral at bedtime  bisacodyl Suppository 10 milliGRAM(s) Rectal daily  chlorhexidine 2% Cloths 1 Application(s) Topical daily  dextrose 50% Injectable 25 Gram(s) IV Push once  dextrose 50% Injectable 12.5 Gram(s) IV Push once  dextrose 50% Injectable 25 Gram(s) IV Push once  dextrose Oral Gel 15 Gram(s) Oral once  diphtheria/tetanus/pertussis (acellular) Vaccine (Adacel) 0.5 milliLiter(s) IntraMuscular once  glucagon  Injectable 1 milliGRAM(s) IntraMuscular once  heparin  Infusion.  Unit(s)/Hr (18 mL/Hr) IV Continuous <Continuous>  lacosamide IVPB 100 milliGRAM(s) IV Intermittent every 12 hours  levETIRAcetam  IVPB 1500 milliGRAM(s) IV Intermittent every 12 hours  lidocaine   4% Patch 1 Patch Transdermal every 24 hours  multivitamin 1 Tablet(s) Oral daily      PHYSICAL EXAM:  T(C): 36.7 (01-05-23 @ 05:18), Max: 36.9 (01-04-23 @ 22:58)  HR: 72 (01-05-23 @ 05:18) (69 - 78)  BP: 121/78 (01-05-23 @ 05:18) (112/78 - 121/78)  RR: 17 (01-05-23 @ 05:18) (17 - 18)  SpO2: 99% (01-05-23 @ 05:18) (98% - 100%)  Wt(kg): --  I&O's Summary          General Exam:   General Appearance: Appropriately dressed and in no acute distress       Head: Normocephalic, atraumatic and no dysmorphic features  Ear, Nose, and Throat: Moist mucous membranes    CVS: S1S2+  Resp: No SOB, no wheeze or rhonchi  Abd: soft NTND  Extremities: No edema, no cyanosis  Skin: No bruises, no rashes  Neurological Exam:    Mental Status: Awake, Oriented x 2-3, able to follow simple and complex verbal commands. answers questions appropriately, able to name, repeat and recall. no aphasia, mild dysarthria (tongue bite)   Cranial Nerves: EOMI, PERRLA, VFF, V1-V3 sensation intact, no facial asymmetry, tongue midline, hearing intact B/L, shoulder shrug appropriate, SCM/trap intact.   Motor: Moving uppers > lowers. uppers at least 4+-5/5 ; lowers R>L 3-4/5   Sensation: intact to LT and PP   Reflexes: 1+ throughout at biceps, brachioradialis, triceps, patellars and ankles bilaterally and equal. No clonus. R toe and L toe were both downgoing.  Coordination: no dysmetria   Gait: unable         LABS:    CARDIAC MARKERS:                                11.9   6.09  )-----------( 181      ( 05 Jan 2023 05:29 )             36.1     01-05    139  |  104  |  12  ----------------------------<  99  3.7   |  24  |  0.76    Ca    8.7      05 Jan 2023 05:29  Phos  4.5     01-05  Mg     2.10     01-05    TPro  6.6  /  Alb  3.1<L>  /  TBili  0.4  /  DBili  x   /  AST  22  /  ALT  25  /  AlkPhos  89  01-04          TELEMETRY: 	    ECG:  	  RADIOLOGY:   DIAGNOSTIC TESTING:  [ ] Echocardiogram:  [ ]  Catheterization:  [ ] Stress Test:    OTHER:

## 2023-01-05 NOTE — PROGRESS NOTE ADULT - ASSESSMENT
52-year-old male with a PMHx significant for TIAs (2011, 2013), CVAs x3 (02/2022 s/p tPA, 8/3/2022 s/p tPA, 11/5/2022 with residual expressive aphasia) on Eliquis, and HLD BIBEMS after being found unresponsive at home on the floor, last known well 12/1 at around noon. C-collar was placed. Patient was intubated in the ED for airway protection. CTH with no acute findings, vEEG with no identified seizures. Patient was weaned off pressors, extubated, and transitioned to floors 12/4/22. 12/5 am RRT called for seizure like activity with urinary incontinence, tongue biting, and R>L posturing, patient s/p ativan 2mg x3. Anesthesia called to RRT for intubation. MICU accepting patient for seizure like activity requiring intubation.      #AMS, Seizure like activity  EEG 12/7: concern for epileptiform activity; though EEG from 12/5 without such abnormalities.   - f/u neuro recs  - Patient found down and unresponsive at home on 12/2, last known well 12/1 at around noon. Intubated in ED 12/2, extubated in MICU 12/4  - RRT 12/5: seizure like acting with urinary incontinence, convulsions, and tongue biting; s/p ativan 2mg x3 with pauses in seizure activity following each; intubated for airway protection with rocuronium. Prolactin ~75   - CTH and CT cervical spine 12/2 negative for any acute pathologies. Tox screen on admission negative. 12/5: Stable exam from prior CT head, chronic microvascular ischemic changes, parenchymal loss, dystrophic calcification of central portion of alexis unchanged.  - Video EEG 12/3-12/4 without any seizure like activity, moderate to severe nonspecific diffuse or multifocal cerebral dysfunction  - c/w keppra 1500mg q12 maintenance, Keppra loaded 4.5g  - patient extubated again on 12/8th  -MRI noted, chronic CVA, no acute CVA noted     MRI noted , new findings for leptomeningeal disease, R/O sarcoid vs lymphoma   Hematology and rheum eval called  repeat scan per heme    LP   LE DVT study positive eliquis switched to heparin full AC for now         #CVAs/TIAs  - Hx of TIAs in 2011 and 2013, CVAs x3 in 02/22, 8/22, and 11/22 with residual expressive aphasia   - on Eliquis and Aspirin at home for hx of stroke  - PFO work up in past negative, no evidence of PFO on MIMI X2  - c/w ASA   - c/w heparin gtt, was on hold, resume eliquis   - Restart Statin when LFTs and CPK improve per neurology  - Being worked up for Mixed Connective Tissue Disease OP- f/u p-ANCA, c-ANCA/ [id number] negative  - Neuro recs appreciated  - repeat brain MRI noted   - LE pain, check MRI     Plan For repeat LP with cytology   GI for colon thickening seen on CT , plan for colonoscopy next week     # Hypernatremia  tredn Na level   renal eval appreciated  S/P hydraiton, trend Na level     #Thecal sac flattening   - CT thoracic and lumbar spine showing degenerative disc disease T6-T7 through L4-L5 and mild disc bulges at L2-L3 through L4-L5 that flatten the ventral thecal sac and narrowing of the bilateral neural foramina  - Will Monitor for now, will consider neuro/neurosurg evaluation in the future    #HLD  - atorvastatin on hold due to elevated CK and LFTs  - restart when possible for secondary stroke prevention    #?ASD/PFO  - Patient reportedly found to have a PFO in February 2022 during a stroke workup at [facility]. Patient presented for a PFO closure in November 2022. Notably, no PFO was found at the time and no intervention was performed by Dr. Lynn.    - TTE 11/5/22 EF 57% and grossly normal LV function  - MIMI performed bedside 12/5 1 of 2 studies (+) on bubble study (uploaded to [facility])  - elevated D d-andrew, Check LE DVT study posotve       #Rhabdomyolysis  - CK 8720 on admission, peaked at 15k, now downtrending  - DDx: prolonged downtime myositis v hyperthermia? (T 105.4 F) v sepsis v seizure induced    - L Ankle adn Knee pain, swelling  S/P Tap last week  cont to have pain and tenderness and swelling  Repeat imaging   Ortho follow up           # Elevated Winston level  CHeck Abd US noted    GI eval PRN   Trend LFTs , normalized, no need for MRCP       #Partial SBO - resolved  - CT chest, abdomen, and pelvis w/ contrast concerning for possible partial SBO without transition point.  - Surgery consulted, no acute intervention at this time  - 12/5 KUB - negative for ileus or SBO  - Hold TF for 12/7 for possible trial of extubation.  - otherwise diet per nutrition      #Leukocytosis  - worsening leukocytosis  - monitor for fever  - Ortho eval fro knee swelling   - Pan Cx  - LP by IR , follow up results         #Concern for sepsis  Unclear source, aspiration v less likely meningitis   BCx (12/2 NGTD repeat 12/5 NGTD) and UC 12/2 NGTD  - Patient initially presented with AMS, T 105.4, tachycardic, and tachypneic   - UA negative  - s/p vanco and zosyn x1 in ED 12/2, ceftriaxone 2g BID 12/3-12/4, vanco 12/3-12/4  - 12/7 DCed Vanc since BCx from 12/5 NGTD  - c/w zosyn for aspiration pna presumed. completed course, monitor   - ID eval appreciated  - febrile again, Ortho for knee asp      # ANemia  noted, no gross bleeding  type and screen  GI eval called   R/O malignancy in vie wof thickening of the colon

## 2023-01-05 NOTE — PROGRESS NOTE ADULT - SUBJECTIVE AND OBJECTIVE BOX
Neurology Progress Note    S: Patient seen and examined ,    +_ covid precuations +DVT s/p LP     Medication:  MEDICATIONS  (STANDING):  aspirin enteric coated 81 milliGRAM(s) Oral daily  atorvastatin 40 milliGRAM(s) Oral at bedtime  bisacodyl Suppository 10 milliGRAM(s) Rectal daily  chlorhexidine 2% Cloths 1 Application(s) Topical daily  dextrose 50% Injectable 25 Gram(s) IV Push once  dextrose 50% Injectable 12.5 Gram(s) IV Push once  dextrose 50% Injectable 25 Gram(s) IV Push once  dextrose Oral Gel 15 Gram(s) Oral once  diphtheria/tetanus/pertussis (acellular) Vaccine (Adacel) 0.5 milliLiter(s) IntraMuscular once  glucagon  Injectable 1 milliGRAM(s) IntraMuscular once  heparin  Infusion.  Unit(s)/Hr (18 mL/Hr) IV Continuous <Continuous>  lacosamide IVPB 100 milliGRAM(s) IV Intermittent every 12 hours  levETIRAcetam  IVPB 1500 milliGRAM(s) IV Intermittent every 12 hours  lidocaine   4% Patch 1 Patch Transdermal every 24 hours  multivitamin 1 Tablet(s) Oral daily    MEDICATIONS  (PRN):  oxyCODONE    IR 5 milliGRAM(s) Oral every 6 hours PRN moderate and severe pain  phenazopyridine 100 milliGRAM(s) Oral every 8 hours PRN dysuria  traMADol 25 milliGRAM(s) Oral every 6 hours PRN Moderate Pain (4 - 6)    Vitals:    Vital Signs Last 24 Hrs  T(C): 36.7 (01-05-23 @ 05:18), Max: 36.9 (01-04-23 @ 22:58)  T(F): 98.1 (01-05-23 @ 05:18), Max: 98.4 (01-04-23 @ 22:58)  HR: 72 (01-05-23 @ 05:18) (69 - 78)  BP: 121/78 (01-05-23 @ 05:18) (112/78 - 121/78)  BP(mean): --  RR: 17 (01-05-23 @ 05:18) (17 - 18)  SpO2: 99% (01-05-23 @ 05:18) (98% - 100%)            General Exam:   General Appearance: Appropriately dressed and in no acute distress       Head: Normocephalic, atraumatic and no dysmorphic features  Ear, Nose, and Throat: Moist mucous membranes    CVS: S1S2+  Resp: No SOB, no wheeze or rhonchi  Abd: soft NTND  Extremities: No edema, no cyanosis  Skin: No bruises, no rashes     Neurological Exam:    Mental Status: Awake, Oriented x 2-3, able to follow simple and complex verbal commands. answers questions appropriately, able to name, repeat and recall. no aphasia, mild dysarthria (tongue bite)   Cranial Nerves: EOMI, PERRLA, VFF, V1-V3 sensation intact, no facial asymmetry, tongue midline, hearing intact B/L, shoulder shrug appropriate, SCM/trap intact.   Motor: Moving uppers > lowers. uppers at least 4+-5/5 ; lowers R>L 3-4/5   Sensation: intact to LT and PP   Reflexes: 1+ throughout at biceps, brachioradialis, triceps, patellars and ankles bilaterally and equal. No clonus. R toe and L toe were both downgoing.  Coordination: no dysmetria   Gait: unable     I personally reviewed the below data/images/labs:  CBC Full  -  ( 05 Jan 2023 05:29 )  WBC Count : 6.09 K/uL  RBC Count : 3.94 M/uL  Hemoglobin : 11.9 g/dL  Hematocrit : 36.1 %  Platelet Count - Automated : 181 K/uL  Mean Cell Volume : 91.6 fL  Mean Cell Hemoglobin : 30.2 pg  Mean Cell Hemoglobin Concentration : 33.0 gm/dL  Auto Neutrophil # : x  Auto Lymphocyte # : x  Auto Monocyte # : x  Auto Eosinophil # : x  Auto Basophil # : x  Auto Neutrophil % : x  Auto Lymphocyte % : x  Auto Monocyte % : x  Auto Eosinophil % : x  Auto Basophil % : x    01-05    139  |  104  |  12  ----------------------------<  99  3.7   |  24  |  0.76    Ca    8.7      05 Jan 2023 05:29  Phos  4.5     01-05  Mg     2.10     01-05    TPro  6.6  /  Alb  3.1<L>  /  TBili  0.4  /  DBili  x   /  AST  22  /  ALT  25  /  AlkPhos  89  01-04      < from: CT Head No Cont (12.02.22 @ 20:27) >    ACC: [id number] EXAM:  CT CERVICAL SPINE                        ACC: [id number] EXAM:  CT MAXILLOFACIAL                        ACC: [id number] EXAM:  CT BRAIN                          PROCEDURE DATE:  12/02/2022        INTERPRETATION:  CLINICAL INDICATION: Trauma.    Technique: Noncontrast axial CT of the head, facial bones, and cervical   spine was performed. 3-D reformats of the facial bones was obtained.   Coronal and sagittal reformats were obtained.      COMPARISON: None.    FINDINGS:  Head CT:  The ventricles and sulci are within normal limits. 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 There is no intraparenchymal hematoma, mass effect   or midline shift. No abnormal extra-axial fluid collections or   hemorrhages are present.    There is swelling of the left lateral scalp. The calvarium is intact.   There are scattered mucosal inflammatory changes in the paranasal sinuses.      Cervical spine CT:  Alignment ismaintained. Vertebral bodies are normal in height, without   evidence of fracture or dislocation. Prevertebral soft tissues are within   normal limits without soft tissue swelling or hematoma.    Intervertebral discs are intact. Neural foramina and spinal canal are   intact.    The visualized lung apices are within normal limits.      Facial bone CT:    No acute facial fracture.    There are scattered mucosal inflammatory changes in the paranasal   sinuses. Mastoid air cells are clear.    Soft tissues appear unremarkable.        IMPRESSION:  CT HEAD: No acute abnormality.  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  CT CERVICAL SPINE: No acute abnormality  CT FACIAL BONE: No acute abnormality    --- End of Report ---       DELGADO IVAN MD; Attending Radiologist  This document has been electronically signed. Dec  2 2022  9:02PM    < end of copied text >  < from: CT Lumbar Spine No Cont (12.02.22 @ 20:27) >    ACC: [id number] EXAM:  CT LUMBAR SPINE                        ACC: [id number] EXAM:  CT THORACIC SPINE                          PROCEDURE DATE:  12/02/2022          INTERPRETATION:  CT thoracic and lumbar spine without IV contrast    CLINICAL INFORMATION:  Trauma  Back pain, fracture.    TECHNIQUE:  Contiguous axial 2 mm sections were obtained through the   thoracic and lumbar spine using a single helical acquisition.     Additional 2 mm sagittal and coronal reconstructions of the spine were   obtained. These additional reformatted images were used to evaluate the   spine for alignment, vertebral fractures and the integrity of the the   posterior elements.   This scan was performed using automatic exposure   control (radiation dose reduction software) to obtain a diagnostic image   quality scan with patient dose as low as reasonably achievable.    FINDINGS:   No prior similar studies are available for review    Thoracic and lumbar vertebral body heights are maintained. No vertebral   fracture is seen. No destructive bone lesion is found.  Alignment is   preserved.  Facet joints appear intact and aligned.    Thoracic and lumbar intervertebral disc spaces appear intact.   Degenerative disc disease and spondylosis is noted at T6-T7 throughL4-5   with loss of disc height and associated degenerative endplate changes.   Mild disc bulges at L2-3 through L4-5 flatten the ventral thecal sac and   narrow the BILATERAL neural foramina.    No paraspinal mass is recognized.  Paraspinal soft tissues appear intact.      IMPRESSION:  Degenerative disc disease and spondylosis is noted at T6-T7   through L4-5 with loss of disc height and associated degenerative   endplate changes. Mild disc bulges at L2-3 through L4-5 flatten the   ventral thecal sac and narrow the BILATERAL neural foramina   No   vertebral fracture is recognized.    --- End of Report ---       ANGIE ESCOBAR MD; Attending Radiologist  This document has been electronically signed. Dec  2 2022  8:55PM    < end of copied text >    EEG Classification / Summary:  Abnormal EEG in a comatose patient due to diffuse suppression gradually improving to discontinuous background, with right hemispheric relative attenuation/suppression. No epileptiform abnormalities or seizures are captured.    Clinical Impression:  Severe diffuse cerebral dysfunction that gradually improves is nonspecific in etiology. In this case, it may be related to sedating medication (propofol) with improvement after decreasing and stopping the medication.  Right hemispheric focal cerebral dysfunction can be structural or functional in etiology.      12/7  Clinical Impression:  -Occasional runs of right frontal lateralized periodic discharges (LPDs) at up to 1.3 Hz indicate a potentially epileptogenic focus in the right frontal region and are on the ictal-interictal continuum.  -A pattern on the ictal-interictal continuum is a pattern that does not qualify as an electrographic seizure or electrographic status epilepticus, but there is a reasonable chance that it may be contributing to impaired alertness, causing other clinical symptoms, and/or contributing to neuronal injury.  -Right hemispheric relative attenuation indicates right hemispheric focal cerebral dysfunction, which can be structural or functional in etiology.  -Rare left frontal epileptiform discharges indicate a potentially epileptogenic focus in this reigon  -Severe diffuse slowing and GRDA indicate severe diffuse cerebral dysfunction of nonspecific etiology.  -No electrographic seizures are captured.     < from: MR Head w/wo IV Cont (12.09.22 @ 19:54) >    ACC: [id number] EXAM:  MR BRAIN WAW IC                          PROCEDURE DATE:  12/09/2022          INTERPRETATION:  CLINICAL INDICATION: CVA, found unresponsive at home      Magnetic resonance imaging of the brain was carried out with transaxial   SPGR, FLAIR, fast spin echo T2 weighted images, axial susceptibility   weighted series, diffusion weighted series and sagittal T1 weighted   series on a 1.5 Maritza magnet. Post contrast axial, coronal and sagittal   T1 weighted images were obtained. 10cc of Gadavist were intravenously   injected, 0 cc were discarded.      Comparison is made with the prior brain CT of 12/5/2022 and MRI 11/5/2022.    Mild ventricular and sulcal prominence is consistent with moderate   atrophy for the patient's age. No acute hemorrhage is identified. There   is a focus of hemosiderin within the alexis which is calcified on CT.   Scattered additional foci of hemosiderin deposition are identified in the   left frontal subcortical white matter and right occipital cortex is   nonspecific but may be related to multiple cavernoma is or hypertension.    There is a tiny focus of diffusion restriction in the right frontal   subcortical white matter and right centrum semiovale which are unchanged   since the prior exam and may represent subacute infarcts. Multiplicity   infarcts may be related to hypercoagulable state or cardioembolic events.    After contrast administration there is normal intracranial vascular   enhancement. No abnormal parenchymal or leptomeningeal enhancement.      IMPRESSION: Atrophy for the patient's age. Right frontal subcortical and   right centrum semiovale punctate infarcts are unchanged since 11/5/2022   and likely represent subacute infarcts. No abnormal enhancement. Focus of   calcification in the alexis with old hemosiderin. A few additional   scattered foci of hemosiderin deposition are unchanged.    --- End of Report ---     < from: MR Head w/wo IV Cont (12.29.22 @ 13:39) >    ACC: [id number] EXAM:  MR BRAIN WAW IC                          PROCEDURE DATE:  12/29/2022          INTERPRETATION:  CLINICAL INFORMATION: Follow-up study for infarct seen   on prior MR 12/9/2022. Patient initially presented to the hospital after   being found unresponsive.    TECHNIQUE: MRI of the brain was performed with and without contrast.   Sagittal and axial T1, axial T2, FLAIR, SWI, diffusion-weighted images   and an ADC map were obtained.    9.5 cc of Gadavist was injected, with 0.5 cc discarded.    COMPARISON: MR brain 12/9/2022    FINDINGS:    Previously seen foci of diffusion restriction in the right frontal   subcortical white matter and right centrum semiovale no longer restricted   diffusion and represent chronic infarcts. No new areas of infarction are   identified.    Foci of susceptibility artifact in the right occipital and right parietal   lobe, unchanged. No acute intracranial hemorrhage. No mass effect or   midline shift.    Areas of contrast enhancement in the bilateral putamen (12-18).   Enhancement of the alexis is also noted.    Moderate prominence of the sulci and ventricles..    There are foci of T2/FLAIR signal hyperintensity within the hemispheric   white matter, which are nonspecific but likely related tosequelae of   microvascular disease.    No abnormal extra-axial fluid collections are present.    Major flow-voids at the base of the brain follow expected course and   contour.    The calvarium is intact. Right ethmoid sinus mucosal thickening.    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 No new areas of infarct are identified. Unchanged scattered   foci of hemosiderin.    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   Differential diagnosis includes infection, sarcoidosis, lymphoma and   CLIPPERS (chronic lymphocytic inflammation with pontine perivascular   enhancement responsive to steroids).    --- End of Report ---         CARMEN KONG MD; Resident Radiologist  This document has been electronically signed.  TANVIR LUNA MD; Attending Radiologist  This document has been electronically signed. Dec 29 2022  5:08PM    < end of copied text >  < from: MR Lower Ext Joint No Cont, Left (01.03.23 @ 10:14) >  N:    1.  Normal appearance of left deep peroneal and tibial nerves at the   level of the ankle.  2.  Acute on chronic denervation edema of left intrinsic hindfoot   musculature.  3.  Acute on chronic left plantar fasciitis with nonacute grade 2   sprain/1.3 cm low to moderate grade partial-thickness tear at the   calcaneal origin of the central band.  4.  Additional lower grade and chronic findings as detailed in the body   section of this report.    --- End of Report ---       < from: MR Lumbar Spine w/wo IV Cont (01.03.23 @ 12:29) >    ACC: [id number] EXAM:  MR SPINE LUMBAR WAW IC                          PROCEDURE DATE:  01/03/2023          INTERPRETATION:  CLINICAL INFORMATION: Foot drop    MRI of the lumbar spine was performed using sagittal T1, T2 and STIR   sequence.    Mild loss of the normal lumbar lordosis is seen.    The vertebral body height and alignment appear normal    Disc desiccation is seen involving the lower thoracic and lumbar spine   region. These findings are secondary to chronic degenerative changes.    T12-L1: Normal    L1-2: Normal    L2-3: Normal    L3-4: Mild disc bulge is seen. No significant compromise of the spinal   canal or either neural foramen    L4-5: Disc bulge is identified. There is abnormal T2 prolongation   associated with the central portion of this disc bulge, this is likely   compatible with an annular fissure. Minimal effacement of ventral thecal   sac is seen. Bilateral hypertrophic facet joint changes. No significant   compromise of the spinal canal.    L5-S1: Normal    The conus ends at L2 and appears normal    Evaluation of the paraspinal soft tissues appear normal    IMPRESSION: Degenerative changes as described above.    --- End of Report ---            TA DANIEL MD; Attending Radiologist  This document has been electronically signed. Gerardo  3 2023  1:13PM    < end of copied text >

## 2023-01-05 NOTE — PROGRESS NOTE ADULT - NS ATTEND AMEND GEN_ALL_CORE FT
Cytology from repeat LP on 1/4 negative.  Awaiting scope to evaluate rectal thickening.
Electrolytes are acceptable today.
Electrolytes are acceptable.
Hypernatremia resolved.
LP was not sent for cytology. plan to repeat LP. rheum following. please repeat lp with cytology
Now hyponatremic.
No pain, no shortness of breath    Review of systems: All 10 points ROS was obtained except as above.     aspirin enteric coated 81 milliGRAM(s) Oral daily  bisacodyl Suppository 10 milliGRAM(s) Rectal daily  chlorhexidine 2% Cloths 1 Application(s) Topical daily  dextrose 50% Injectable 25 Gram(s) IV Push once  dextrose 50% Injectable 12.5 Gram(s) IV Push once  dextrose 50% Injectable 25 Gram(s) IV Push once  dextrose Oral Gel 15 Gram(s) Oral once  diphtheria/tetanus/pertussis (acellular) Vaccine (Adacel) 0.5 milliLiter(s) IntraMuscular once  glucagon  Injectable 1 milliGRAM(s) IntraMuscular once  heparin   Injectable 5000 Unit(s) SubCutaneous every 8 hours  ibuprofen  Tablet. 400 milliGRAM(s) Oral every 6 hours PRN  lacosamide IVPB 100 milliGRAM(s) IV Intermittent every 12 hours  levETIRAcetam  IVPB 1500 milliGRAM(s) IV Intermittent every 12 hours      VITAL:  T(C): , Max: 37 (12-19-22 @ 04:59)  T(F): , Max: 98.6 (12-19-22 @ 04:59)  HR: 86 (12-19-22 @ 21:00)  BP: 132/84 (12-19-22 @ 21:00)  BP(mean): --  RR: 17 (12-19-22 @ 21:00)  SpO2: 99% (12-19-22 @ 21:00)  Wt(kg): --      PHYSICAL EXAM:  General: NAD; Alert  HEENT:  NCAT; PERRLA  Neck: No JVD; supple  Respiratory: CTA-b/l  Cardiac: RRR s1s2  Gastrointestinal: BS+, soft, NT/ND  Urologic: No fish  Extremities: No peripheral edema      LABS:                          10.6   15.26 )-----------( 479      ( 19 Dec 2022 07:05 )             32.5     Na(134)/K(4.2)/Cl(103)/HCO3(22)/BUN(18)/Cr(0.73)Glu(108)/Ca(8.3)/Mg(2.30)/PO4(3.7)    12-19 @ 07:05  Na(136)/K(4.0)/Cl(103)/HCO3(24)/BUN(18)/Cr(0.76)Glu(106)/Ca(8.2)/Mg(2.10)/PO4(3.3)    12-18 @ 06:46  Na(137)/K(3.8)/Cl(105)/HCO3(23)/BUN(17)/Cr(0.65)Glu(111)/Ca(7.9)/Mg(2.00)/PO4(3.1)    12-17 @ 07:25      Sodium, Random Urine: 60 mmol/L (12-19 @ 15:00)  Osmolality, Random Urine: 389 mosm/kg (12-19 @ 15:00)        ASSESSMENT/PLAN  Check UA as well as serum osmolality as well. Continue free water restriction. 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

## 2023-01-06 LAB
ALBUMIN SERPL ELPH-MCNC: 3.5 G/DL — SIGNIFICANT CHANGE UP (ref 3.3–5)
ALP SERPL-CCNC: 102 U/L — SIGNIFICANT CHANGE UP (ref 40–120)
ALT FLD-CCNC: 50 U/L — HIGH (ref 4–41)
ANION GAP SERPL CALC-SCNC: 13 MMOL/L — SIGNIFICANT CHANGE UP (ref 7–14)
ANTI-PHOSPHATIDYLETHANOLAMINE IGA: 1.2 U/ML — SIGNIFICANT CHANGE UP
ANTI-PHOSPHATIDYLETHANOLAMINE IGG: 0.7 U/ML — SIGNIFICANT CHANGE UP
ANTI-PHOSPHATIDYLETHANOLAMINE IGM: 1.1 U/ML — SIGNIFICANT CHANGE UP
APTT BLD: 30.6 SEC — SIGNIFICANT CHANGE UP (ref 27–36.3)
APTT BLD: 49.9 SEC — HIGH (ref 27–36.3)
AST SERPL-CCNC: 34 U/L — SIGNIFICANT CHANGE UP (ref 4–40)
BILIRUB SERPL-MCNC: 0.6 MG/DL — SIGNIFICANT CHANGE UP (ref 0.2–1.2)
BUN SERPL-MCNC: 9 MG/DL — SIGNIFICANT CHANGE UP (ref 7–23)
CALCIUM SERPL-MCNC: 9.4 MG/DL — SIGNIFICANT CHANGE UP (ref 8.4–10.5)
CHLORIDE SERPL-SCNC: 102 MMOL/L — SIGNIFICANT CHANGE UP (ref 98–107)
CO2 SERPL-SCNC: 23 MMOL/L — SIGNIFICANT CHANGE UP (ref 22–31)
CREAT SERPL-MCNC: 0.69 MG/DL — SIGNIFICANT CHANGE UP (ref 0.5–1.3)
EGFR: 111 ML/MIN/1.73M2 — SIGNIFICANT CHANGE UP
GLUCOSE SERPL-MCNC: 138 MG/DL — HIGH (ref 70–99)
HCT VFR BLD CALC: 33.6 % — LOW (ref 39–50)
HCT VFR BLD CALC: 39.1 % — SIGNIFICANT CHANGE UP (ref 39–50)
HCT VFR BLD CALC: 39.1 % — SIGNIFICANT CHANGE UP (ref 39–50)
HGB BLD-MCNC: 11.3 G/DL — LOW (ref 13–17)
HGB BLD-MCNC: 12.9 G/DL — LOW (ref 13–17)
HGB BLD-MCNC: 12.9 G/DL — LOW (ref 13–17)
INR BLD: 1.08 RATIO — SIGNIFICANT CHANGE UP (ref 0.88–1.16)
MAGNESIUM SERPL-MCNC: 2 MG/DL — SIGNIFICANT CHANGE UP (ref 1.6–2.6)
MCHC RBC-ENTMCNC: 29.7 PG — SIGNIFICANT CHANGE UP (ref 27–34)
MCHC RBC-ENTMCNC: 29.7 PG — SIGNIFICANT CHANGE UP (ref 27–34)
MCHC RBC-ENTMCNC: 30 PG — SIGNIFICANT CHANGE UP (ref 27–34)
MCHC RBC-ENTMCNC: 33 GM/DL — SIGNIFICANT CHANGE UP (ref 32–36)
MCHC RBC-ENTMCNC: 33 GM/DL — SIGNIFICANT CHANGE UP (ref 32–36)
MCHC RBC-ENTMCNC: 33.6 GM/DL — SIGNIFICANT CHANGE UP (ref 32–36)
MCV RBC AUTO: 89.1 FL — SIGNIFICANT CHANGE UP (ref 80–100)
MCV RBC AUTO: 89.9 FL — SIGNIFICANT CHANGE UP (ref 80–100)
MCV RBC AUTO: 90.1 FL — SIGNIFICANT CHANGE UP (ref 80–100)
NRBC # BLD: 0 /100 WBCS — SIGNIFICANT CHANGE UP (ref 0–0)
NRBC # FLD: 0 K/UL — SIGNIFICANT CHANGE UP (ref 0–0)
PHOSPHATE SERPL-MCNC: 3.3 MG/DL — SIGNIFICANT CHANGE UP (ref 2.5–4.5)
PLATELET # BLD AUTO: 166 K/UL — SIGNIFICANT CHANGE UP (ref 150–400)
PLATELET # BLD AUTO: 170 K/UL — SIGNIFICANT CHANGE UP (ref 150–400)
PLATELET # BLD AUTO: 179 K/UL — SIGNIFICANT CHANGE UP (ref 150–400)
POTASSIUM SERPL-MCNC: 3.9 MMOL/L — SIGNIFICANT CHANGE UP (ref 3.5–5.3)
POTASSIUM SERPL-SCNC: 3.9 MMOL/L — SIGNIFICANT CHANGE UP (ref 3.5–5.3)
PROT SERPL-MCNC: 7.3 G/DL — SIGNIFICANT CHANGE UP (ref 6–8.3)
PROTHROM AB SERPL-ACNC: 12.5 SEC — SIGNIFICANT CHANGE UP (ref 10.5–13.4)
RBC # BLD: 3.77 M/UL — LOW (ref 4.2–5.8)
RBC # BLD: 4.34 M/UL — SIGNIFICANT CHANGE UP (ref 4.2–5.8)
RBC # BLD: 4.35 M/UL — SIGNIFICANT CHANGE UP (ref 4.2–5.8)
RBC # FLD: 13.7 % — SIGNIFICANT CHANGE UP (ref 10.3–14.5)
RBC # FLD: 13.9 % — SIGNIFICANT CHANGE UP (ref 10.3–14.5)
RBC # FLD: 14.1 % — SIGNIFICANT CHANGE UP (ref 10.3–14.5)
SODIUM SERPL-SCNC: 138 MMOL/L — SIGNIFICANT CHANGE UP (ref 135–145)
WBC # BLD: 4.73 K/UL — SIGNIFICANT CHANGE UP (ref 3.8–10.5)
WBC # BLD: 8.63 K/UL — SIGNIFICANT CHANGE UP (ref 3.8–10.5)
WBC # BLD: 9.9 K/UL — SIGNIFICANT CHANGE UP (ref 3.8–10.5)
WBC # FLD AUTO: 4.73 K/UL — SIGNIFICANT CHANGE UP (ref 3.8–10.5)
WBC # FLD AUTO: 8.63 K/UL — SIGNIFICANT CHANGE UP (ref 3.8–10.5)
WBC # FLD AUTO: 9.9 K/UL — SIGNIFICANT CHANGE UP (ref 3.8–10.5)

## 2023-01-06 PROCEDURE — 45380 COLONOSCOPY AND BIOPSY: CPT

## 2023-01-06 PROCEDURE — 88305 TISSUE EXAM BY PATHOLOGIST: CPT | Mod: 26

## 2023-01-06 RX ORDER — HEPARIN SODIUM 5000 [USP'U]/ML
8000 INJECTION INTRAVENOUS; SUBCUTANEOUS EVERY 6 HOURS
Refills: 0 | Status: DISCONTINUED | OUTPATIENT
Start: 2023-01-06 | End: 2023-01-10

## 2023-01-06 RX ORDER — HEPARIN SODIUM 5000 [USP'U]/ML
1600 INJECTION INTRAVENOUS; SUBCUTANEOUS
Qty: 25000 | Refills: 0 | Status: DISCONTINUED | OUTPATIENT
Start: 2023-01-06 | End: 2023-01-10

## 2023-01-06 RX ORDER — HEPARIN SODIUM 5000 [USP'U]/ML
4000 INJECTION INTRAVENOUS; SUBCUTANEOUS EVERY 6 HOURS
Refills: 0 | Status: DISCONTINUED | OUTPATIENT
Start: 2023-01-06 | End: 2023-01-10

## 2023-01-06 RX ADMIN — HEPARIN SODIUM 1600 UNIT(S)/HR: 5000 INJECTION INTRAVENOUS; SUBCUTANEOUS at 17:17

## 2023-01-06 RX ADMIN — ATORVASTATIN CALCIUM 40 MILLIGRAM(S): 80 TABLET, FILM COATED ORAL at 21:10

## 2023-01-06 RX ADMIN — Medication 108 MILLIGRAM(S): at 21:27

## 2023-01-06 RX ADMIN — LACOSAMIDE 120 MILLIGRAM(S): 50 TABLET ORAL at 05:15

## 2023-01-06 RX ADMIN — Medication 108 MILLIGRAM(S): at 05:15

## 2023-01-06 RX ADMIN — LACOSAMIDE 120 MILLIGRAM(S): 50 TABLET ORAL at 17:21

## 2023-01-06 RX ADMIN — OXYCODONE HYDROCHLORIDE 5 MILLIGRAM(S): 5 TABLET ORAL at 13:01

## 2023-01-06 RX ADMIN — LEVETIRACETAM 400 MILLIGRAM(S): 250 TABLET, FILM COATED ORAL at 17:20

## 2023-01-06 RX ADMIN — HEPARIN SODIUM 1800 UNIT(S)/HR: 5000 INJECTION INTRAVENOUS; SUBCUTANEOUS at 23:48

## 2023-01-06 RX ADMIN — LIDOCAINE 1 PATCH: 4 CREAM TOPICAL at 21:11

## 2023-01-06 RX ADMIN — HEPARIN SODIUM 4000 UNIT(S): 5000 INJECTION INTRAVENOUS; SUBCUTANEOUS at 23:53

## 2023-01-06 RX ADMIN — HEPARIN SODIUM 1600 UNIT(S)/HR: 5000 INJECTION INTRAVENOUS; SUBCUTANEOUS at 19:52

## 2023-01-06 RX ADMIN — CHLORHEXIDINE GLUCONATE 1 APPLICATION(S): 213 SOLUTION TOPICAL at 12:59

## 2023-01-06 RX ADMIN — LEVETIRACETAM 400 MILLIGRAM(S): 250 TABLET, FILM COATED ORAL at 05:16

## 2023-01-06 RX ADMIN — Medication 81 MILLIGRAM(S): at 13:00

## 2023-01-06 RX ADMIN — Medication 1 TABLET(S): at 13:00

## 2023-01-06 NOTE — PROGRESS NOTE ADULT - SUBJECTIVE AND OBJECTIVE BOX
Subjective: Patient seen and examined. No new events except as noted.     REVIEW OF SYSTEMS:    CONSTITUTIONAL: +weakness, fevers or chills  EYES/ENT: No visual changes;  No vertigo or throat pain   NECK: No pain or stiffness  RESPIRATORY: No cough, wheezing, hemoptysis; No shortness of breath  CARDIOVASCULAR: No chest pain or palpitations  GASTROINTESTINAL: No abdominal or epigastric pain. No nausea, vomiting, or hematemesis; No diarrhea or constipation. No melena or hematochezia.  GENITOURINARY: No dysuria, frequency or hematuria  NEUROLOGICAL: No numbness or weakness  SKIN: No itching, burning, rashes, or lesions   All other review of systems is negative unless indicated above.    MEDICATIONS:  MEDICATIONS  (STANDING):  aspirin enteric coated 81 milliGRAM(s) Oral daily  atorvastatin 40 milliGRAM(s) Oral at bedtime  bisacodyl Suppository 10 milliGRAM(s) Rectal daily  chlorhexidine 2% Cloths 1 Application(s) Topical daily  dextrose 50% Injectable 25 Gram(s) IV Push once  dextrose 50% Injectable 25 Gram(s) IV Push once  dextrose 50% Injectable 12.5 Gram(s) IV Push once  dextrose Oral Gel 15 Gram(s) Oral once  diphtheria/tetanus/pertussis (acellular) Vaccine (Adacel) 0.5 milliLiter(s) IntraMuscular once  glucagon  Injectable 1 milliGRAM(s) IntraMuscular once  lacosamide IVPB 100 milliGRAM(s) IV Intermittent every 12 hours  levETIRAcetam  IVPB 1500 milliGRAM(s) IV Intermittent every 12 hours  lidocaine   4% Patch 1 Patch Transdermal every 24 hours  methylPREDNISolone sodium succinate IVPB 500 milliGRAM(s) IV Intermittent two times a day  multivitamin 1 Tablet(s) Oral daily      PHYSICAL EXAM:  T(C): 36.1 (01-06-23 @ 09:10), Max: 36.6 (01-05-23 @ 22:16)  HR: 79 (01-06-23 @ 09:40) (74 - 89)  BP: 108/78 (01-06-23 @ 09:40) (95/61 - 122/77)  RR: 16 (01-06-23 @ 09:40) (15 - 20)  SpO2: 96% (01-06-23 @ 09:40) (96% - 100%)  Wt(kg): --  I&O's Summary    Height (cm): 190.5 (01-06 @ 07:22)  Weight (kg): 95.7 (01-06 @ 07:22)  BMI (kg/m2): 26.4 (01-06 @ 07:22)  BSA (m2): 2.24 (01-06 @ 07:22)        General Appearance: Appropriately dressed and in no acute distress       Head: Normocephalic, atraumatic and no dysmorphic features  Ear, Nose, and Throat: Moist mucous membranes    CVS: S1S2+  Resp: No SOB, no wheeze or rhonchi  Abd: soft NTND  Extremities: No edema, no cyanosis  Skin: No bruises, no rashes  Neurological Exam:    Mental Status: Awake, Oriented x 2-3, able to follow simple and complex verbal commands. answers questions appropriately, able to name, repeat and recall. no aphasia, mild dysarthria (tongue bite)   Cranial Nerves: EOMI, PERRLA, VFF, V1-V3 sensation intact, no facial asymmetry, tongue midline, hearing intact B/L, shoulder shrug appropriate, SCM/trap intact.   Motor: Moving uppers > lowers. uppers at least 4+-5/5 ; lowers R>L 3-4/5   Sensation: intact to LT and PP   Reflexes: 1+ throughout at biceps, brachioradialis, triceps, patellars and ankles bilaterally and equal. No clonus. R toe and L toe were both downgoing.  Coordination: no dysmetria   Gait: unable           LABS:    CARDIAC MARKERS:                                12.9   4.73  )-----------( 170      ( 06 Jan 2023 05:46 )             39.1     01-06    138  |  102  |  9   ----------------------------<  138<H>  3.9   |  23  |  0.69    Ca    9.4      06 Jan 2023 02:01  Phos  3.3     01-06  Mg     2.00     01-06    TPro  7.3  /  Alb  3.5  /  TBili  0.6  /  DBili  x   /  AST  34  /  ALT  50<H>  /  AlkPhos  102  01-06    proBNP:   Lipid Profile:   HgA1c:   TSH:             TELEMETRY: 	    ECG:  	  RADIOLOGY:   DIAGNOSTIC TESTING:  [ ] Echocardiogram:  [ ]  Catheterization:  [ ] Stress Test:    OTHER:

## 2023-01-06 NOTE — PROGRESS NOTE ADULT - SUBJECTIVE AND OBJECTIVE BOX
Name of Patient : TAMI DUNHAM  MRN: [id number]  Date of visit: 01-06-23       Subjective: Patient seen and examined. No new events except as noted.   colonoscopy today      REVIEW OF SYSTEMS:    CONSTITUTIONAL: No weakness, fevers or chills  EYES/ENT: No visual changes;  No vertigo or throat pain   NECK: No pain or stiffness  RESPIRATORY: No cough, wheezing, hemoptysis; No shortness of breath  CARDIOVASCULAR: No chest pain or palpitations  GASTROINTESTINAL: No abdominal or epigastric pain. No nausea, vomiting, or hematemesis; No diarrhea or constipation. No melena or hematochezia.  GENITOURINARY: No dysuria, frequency or hematuria  NEUROLOGICAL: No numbness or weakness  SKIN: No itching, burning, rashes, or lesions   All other review of systems is negative unless indicated above.    MEDICATIONS:  MEDICATIONS  (STANDING):  aspirin enteric coated 81 milliGRAM(s) Oral daily  atorvastatin 40 milliGRAM(s) Oral at bedtime  bisacodyl Suppository 10 milliGRAM(s) Rectal daily  chlorhexidine 2% Cloths 1 Application(s) Topical daily  dextrose 50% Injectable 25 Gram(s) IV Push once  dextrose 50% Injectable 12.5 Gram(s) IV Push once  dextrose 50% Injectable 25 Gram(s) IV Push once  dextrose Oral Gel 15 Gram(s) Oral once  diphtheria/tetanus/pertussis (acellular) Vaccine (Adacel) 0.5 milliLiter(s) IntraMuscular once  glucagon  Injectable 1 milliGRAM(s) IntraMuscular once  heparin  Infusion. 1600 Unit(s)/Hr (16 mL/Hr) IV Continuous <Continuous>  lacosamide IVPB 100 milliGRAM(s) IV Intermittent every 12 hours  levETIRAcetam  IVPB 1500 milliGRAM(s) IV Intermittent every 12 hours  lidocaine   4% Patch 1 Patch Transdermal every 24 hours  methylPREDNISolone sodium succinate IVPB 500 milliGRAM(s) IV Intermittent two times a day  multivitamin 1 Tablet(s) Oral daily      PHYSICAL EXAM:  T(C): 37.1 (01-06-23 @ 17:45), Max: 37.1 (01-06-23 @ 17:45)  HR: 96 (01-06-23 @ 17:45) (78 - 96)  BP: 122/84 (01-06-23 @ 17:45) (95/61 - 122/84)  RR: 17 (01-06-23 @ 17:45) (15 - 20)  SpO2: 99% (01-06-23 @ 17:45) (96% - 100%)  Wt(kg): --  I&O's Summary    Height (cm): 190.5 (01-06 @ 07:22)  Weight (kg): 95.7 (01-06 @ 07:22)  BMI (kg/m2): 26.4 (01-06 @ 07:22)  BSA (m2): 2.24 (01-06 @ 07:22)    Appearance: Normal	  HEENT:  PERRLA   Lymphatic: No lymphadenopathy   Cardiovascular: Normal S1 S2, no JVD  Respiratory: normal effort , clear  Gastrointestinal:  Soft, Non-tender  Skin: No rashes,  warm to touch  Psychiatry:  Mood & affect appropriate  Musculuskeletal: knee pain improved       All labs, Imaging and EKGs personally reviewed                             12.9   4.73  )-----------( 170      ( 06 Jan 2023 05:46 )             39.1               01-06    138  |  102  |  9   ----------------------------<  138<H>  3.9   |  23  |  0.69    Ca    9.4      06 Jan 2023 02:01  Phos  3.3     01-06  Mg     2.00     01-06    TPro  7.3  /  Alb  3.5  /  TBili  0.6  /  DBili  x   /  AST  34  /  ALT  50<H>  /  AlkPhos  102  01-06    PT/INR - ( 06 Jan 2023 02:01 )   PT: 12.5 sec;   INR: 1.08 ratio         PTT - ( 06 Jan 2023 02:01 )  PTT:30.6 sec

## 2023-01-06 NOTE — CHART NOTE - NSCHARTNOTEFT_GEN_A_CORE
Given that patient has had repeat LP and has had colonoscopy and biopsy of benign looking polyp will need to consider LN biopsy of pelvic LN to help determine etiology of patient's symptoms which if negative may necessitate biopsy of CNS lesions. Will need Neurology's insight in regards to next steps in the setting of LP results    Lukasz Trotter MD, PGY-4  Rheumatology Fellow  Reachable on TEAMS

## 2023-01-06 NOTE — PROVIDER CONTACT NOTE (CRITICAL VALUE NOTIFICATION) - BACKGROUND
Admitted for altered mental status

## 2023-01-06 NOTE — PROGRESS NOTE ADULT - PROBLEM SELECTOR PLAN 4
Hx of TIAs in 2011 and 2013, CVAs x3 in 02/22, 8/22, and 11/22 with residual expressive aphasia    - on ASA/Eliquis (eliquis on hold)  on hep gtt  for colonoscopy : acceptable cardiac risk  to proceed

## 2023-01-06 NOTE — PROGRESS NOTE ADULT - ASSESSMENT
52-year-old male with a PMHx significant for TIAs (2011, 2013), CVAs x3 (02/2022 s/p tPA, 8/3/2022 s/p tPA, 11/5/2022 with residual expressive aphasia) on Eliquis, and HLD BIBEMS after being found unresponsive at home on the floor, last known well 12/1 at around noon. C-collar was placed. Patient was intubated in the ED for airway protection. CTH with no acute findings, vEEG with no identified seizures. Patient was weaned off pressors, extubated, and transitioned to floors 12/4/22. 12/5 am RRT called for seizure like activity with urinary incontinence, tongue biting, and R>L posturing, patient s/p ativan 2mg x3. Anesthesia called to RRT for intubation. MICU accepting patient for seizure like activity requiring intubation.      #AMS, Seizure like activity  EEG 12/7: concern for epileptiform activity; though EEG from 12/5 without such abnormalities.   - f/u neuro recs  - Patient found down and unresponsive at home on 12/2, last known well 12/1 at around noon. Intubated in ED 12/2, extubated in MICU 12/4  - RRT 12/5: seizure like acting with urinary incontinence, convulsions, and tongue biting; s/p ativan 2mg x3 with pauses in seizure activity following each; intubated for airway protection with rocuronium. Prolactin ~75   - CTH and CT cervical spine 12/2 negative for any acute pathologies. Tox screen on admission negative. 12/5: Stable exam from prior CT head, chronic microvascular ischemic changes, parenchymal loss, dystrophic calcification of central portion of alexis unchanged.  - Video EEG 12/3-12/4 without any seizure like activity, moderate to severe nonspecific diffuse or multifocal cerebral dysfunction  - c/w keppra 1500mg q12 maintenance, Keppra loaded 4.5g  - patient extubated again on 12/8th  -MRI noted, chronic CVA, no acute CVA noted     MRI noted , new findings for leptomeningeal disease, R/O sarcoid vs lymphoma   Hematology and rheum eval called  repeat scan per heme    LP done, awaiting cyto   LE DVT study positive eliquis switched to heparin full AC for now   S/P colonoscopy for malignancy W/U        #CVAs/TIAs  - Hx of TIAs in 2011 and 2013, CVAs x3 in 02/22, 8/22, and 11/22 with residual expressive aphasia   - on Eliquis and Aspirin at home for hx of stroke  - PFO work up in past negative, no evidence of PFO on MIMI X2  - c/w ASA   - c/w heparin gtt, was on hold, resume eliquis   - Restart Statin when LFTs and CPK improve per neurology  - Being worked up for Mixed Connective Tissue Disease OP- f/u p-ANCA, c-ANCA/ [id number] negative  - Neuro recs appreciated  - repeat brain MRI noted   - LE pain, check MRI     Plan For repeat LP with cytology   GI for colon thickening seen on CT , plan for colonoscopy next week     # Hypernatremia  tredn Na level   renal eval appreciated  S/P hydraiton, trend Na level     #Thecal sac flattening   - CT thoracic and lumbar spine showing degenerative disc disease T6-T7 through L4-L5 and mild disc bulges at L2-L3 through L4-L5 that flatten the ventral thecal sac and narrowing of the bilateral neural foramina  - Will Monitor for now, will consider neuro/neurosurg evaluation in the future    #HLD  - atorvastatin on hold due to elevated CK and LFTs  - restart when possible for secondary stroke prevention    #?ASD/PFO  - Patient reportedly found to have a PFO in February 2022 during a stroke workup at [place]. Patient presented for a PFO closure in November 2022. Notably, no PFO was found at the time and no intervention was performed by Dr. Lynn.    - TTE 11/5/22 EF 57% and grossly normal LV function  - MIMI performed bedside 12/5 1 of 2 studies (+) on bubble study (uploaded to [facility])  - elevated D d-andrew, Check LE DVT study posotve       #Rhabdomyolysis  - CK 8720 on admission, peaked at 15k, now downtrending  - DDx: prolonged downtime myositis v hyperthermia? (T 105.4 F) v sepsis v seizure induced    - L Ankle adn Knee pain, swelling  S/P Tap last week  cont to have pain and tenderness and swelling  Repeat imaging   Ortho follow up           # Elevated Winston level  CHeck Abd US noted    GI eval PRN   Trend LFTs , normalized, no need for MRCP       #Partial SBO - resolved  - CT chest, abdomen, and pelvis w/ contrast concerning for possible partial SBO without transition point.  - Surgery consulted, no acute intervention at this time  - 12/5 KUB - negative for ileus or SBO  - Hold TF for 12/7 for possible trial of extubation.  - otherwise diet per nutrition      #Leukocytosis  - worsening leukocytosis  - monitor for fever  - Ortho eval fro knee swelling   - Pan Cx  - LP by IR , follow up results         #Concern for sepsis  Unclear source, aspiration v less likely meningitis   BCx (12/2 NGTD repeat 12/5 NGTD) and UC 12/2 NGTD  - Patient initially presented with AMS, T 105.4, tachycardic, and tachypneic   - UA negative  - s/p vanco and zosyn x1 in ED 12/2, ceftriaxone 2g BID 12/3-12/4, vanco 12/3-12/4  - 12/7 DCed Vanc since BCx from 12/5 NGTD  - c/w zosyn for aspiration pna presumed. completed course, monitor   - ID eval appreciated  - febrile again, Ortho for knee asp      # ANemia  noted, no gross bleeding  type and screen  GI eval called   R/O malignancy in vie wof thickening of the colon

## 2023-01-07 LAB
ANION GAP SERPL CALC-SCNC: 13 MMOL/L — SIGNIFICANT CHANGE UP (ref 7–14)
APTT BLD: 135.9 SEC — SIGNIFICANT CHANGE UP (ref 27–36.3)
APTT BLD: 61.8 SEC — HIGH (ref 27–36.3)
APTT BLD: 68.8 SEC — HIGH (ref 27–36.3)
BASOPHILS # BLD AUTO: 0.01 K/UL — SIGNIFICANT CHANGE UP (ref 0–0.2)
BASOPHILS NFR BLD AUTO: 0.1 % — SIGNIFICANT CHANGE UP (ref 0–2)
BUN SERPL-MCNC: 20 MG/DL — SIGNIFICANT CHANGE UP (ref 7–23)
CALCIUM SERPL-MCNC: 9.1 MG/DL — SIGNIFICANT CHANGE UP (ref 8.4–10.5)
CHLORIDE SERPL-SCNC: 102 MMOL/L — SIGNIFICANT CHANGE UP (ref 98–107)
CO2 SERPL-SCNC: 24 MMOL/L — SIGNIFICANT CHANGE UP (ref 22–31)
CREAT SERPL-MCNC: 0.77 MG/DL — SIGNIFICANT CHANGE UP (ref 0.5–1.3)
CULTURE RESULTS: NO GROWTH — SIGNIFICANT CHANGE UP
EGFR: 108 ML/MIN/1.73M2 — SIGNIFICANT CHANGE UP
EOSINOPHIL # BLD AUTO: 0 K/UL — SIGNIFICANT CHANGE UP (ref 0–0.5)
EOSINOPHIL NFR BLD AUTO: 0 % — SIGNIFICANT CHANGE UP (ref 0–6)
GLUCOSE SERPL-MCNC: 158 MG/DL — HIGH (ref 70–99)
HCT VFR BLD CALC: 35.5 % — LOW (ref 39–50)
HGB BLD-MCNC: 11.6 G/DL — LOW (ref 13–17)
IANC: 10.74 K/UL — HIGH (ref 1.8–7.4)
IMM GRANULOCYTES NFR BLD AUTO: 1.3 % — HIGH (ref 0–0.9)
LYMPHOCYTES # BLD AUTO: 0.89 K/UL — LOW (ref 1–3.3)
LYMPHOCYTES # BLD AUTO: 7.4 % — LOW (ref 13–44)
MAGNESIUM SERPL-MCNC: 2 MG/DL — SIGNIFICANT CHANGE UP (ref 1.6–2.6)
MCHC RBC-ENTMCNC: 30.1 PG — SIGNIFICANT CHANGE UP (ref 27–34)
MCHC RBC-ENTMCNC: 32.7 GM/DL — SIGNIFICANT CHANGE UP (ref 32–36)
MCV RBC AUTO: 92.2 FL — SIGNIFICANT CHANGE UP (ref 80–100)
MONOCYTES # BLD AUTO: 0.15 K/UL — SIGNIFICANT CHANGE UP (ref 0–0.9)
MONOCYTES NFR BLD AUTO: 1.3 % — LOW (ref 2–14)
NEUTROPHILS # BLD AUTO: 10.74 K/UL — HIGH (ref 1.8–7.4)
NEUTROPHILS NFR BLD AUTO: 89.9 % — HIGH (ref 43–77)
NRBC # BLD: 0 /100 WBCS — SIGNIFICANT CHANGE UP (ref 0–0)
NRBC # FLD: 0 K/UL — SIGNIFICANT CHANGE UP (ref 0–0)
PHOSPHATE SERPL-MCNC: 4.9 MG/DL — HIGH (ref 2.5–4.5)
PLATELET # BLD AUTO: 154 K/UL — SIGNIFICANT CHANGE UP (ref 150–400)
POTASSIUM SERPL-MCNC: 3.9 MMOL/L — SIGNIFICANT CHANGE UP (ref 3.5–5.3)
POTASSIUM SERPL-SCNC: 3.9 MMOL/L — SIGNIFICANT CHANGE UP (ref 3.5–5.3)
RBC # BLD: 3.85 M/UL — LOW (ref 4.2–5.8)
RBC # FLD: 14.1 % — SIGNIFICANT CHANGE UP (ref 10.3–14.5)
SODIUM SERPL-SCNC: 139 MMOL/L — SIGNIFICANT CHANGE UP (ref 135–145)
SPECIMEN SOURCE: SIGNIFICANT CHANGE UP
WBC # BLD: 11.95 K/UL — HIGH (ref 3.8–10.5)
WBC # FLD AUTO: 11.95 K/UL — HIGH (ref 3.8–10.5)

## 2023-01-07 PROCEDURE — 99231 SBSQ HOSP IP/OBS SF/LOW 25: CPT

## 2023-01-07 RX ADMIN — HEPARIN SODIUM 1500 UNIT(S)/HR: 5000 INJECTION INTRAVENOUS; SUBCUTANEOUS at 23:58

## 2023-01-07 RX ADMIN — Medication 81 MILLIGRAM(S): at 12:43

## 2023-01-07 RX ADMIN — HEPARIN SODIUM 1500 UNIT(S)/HR: 5000 INJECTION INTRAVENOUS; SUBCUTANEOUS at 19:40

## 2023-01-07 RX ADMIN — HEPARIN SODIUM 1500 UNIT(S)/HR: 5000 INJECTION INTRAVENOUS; SUBCUTANEOUS at 11:58

## 2023-01-07 RX ADMIN — OXYCODONE HYDROCHLORIDE 5 MILLIGRAM(S): 5 TABLET ORAL at 12:43

## 2023-01-07 RX ADMIN — OXYCODONE HYDROCHLORIDE 5 MILLIGRAM(S): 5 TABLET ORAL at 19:37

## 2023-01-07 RX ADMIN — LIDOCAINE 1 PATCH: 4 CREAM TOPICAL at 17:25

## 2023-01-07 RX ADMIN — Medication 108 MILLIGRAM(S): at 08:39

## 2023-01-07 RX ADMIN — LACOSAMIDE 120 MILLIGRAM(S): 50 TABLET ORAL at 17:24

## 2023-01-07 RX ADMIN — HEPARIN SODIUM 1500 UNIT(S)/HR: 5000 INJECTION INTRAVENOUS; SUBCUTANEOUS at 10:05

## 2023-01-07 RX ADMIN — LACOSAMIDE 120 MILLIGRAM(S): 50 TABLET ORAL at 05:58

## 2023-01-07 RX ADMIN — HEPARIN SODIUM 1500 UNIT(S)/HR: 5000 INJECTION INTRAVENOUS; SUBCUTANEOUS at 16:48

## 2023-01-07 RX ADMIN — HEPARIN SODIUM 1800 UNIT(S)/HR: 5000 INJECTION INTRAVENOUS; SUBCUTANEOUS at 07:41

## 2023-01-07 RX ADMIN — ATORVASTATIN CALCIUM 40 MILLIGRAM(S): 80 TABLET, FILM COATED ORAL at 22:01

## 2023-01-07 RX ADMIN — Medication 108 MILLIGRAM(S): at 18:56

## 2023-01-07 RX ADMIN — LEVETIRACETAM 400 MILLIGRAM(S): 250 TABLET, FILM COATED ORAL at 17:25

## 2023-01-07 RX ADMIN — Medication 10 MILLIGRAM(S): at 12:43

## 2023-01-07 RX ADMIN — HEPARIN SODIUM 0 UNIT(S)/HR: 5000 INJECTION INTRAVENOUS; SUBCUTANEOUS at 08:31

## 2023-01-07 RX ADMIN — Medication 1 TABLET(S): at 12:43

## 2023-01-07 RX ADMIN — CHLORHEXIDINE GLUCONATE 1 APPLICATION(S): 213 SOLUTION TOPICAL at 12:44

## 2023-01-07 RX ADMIN — LIDOCAINE 1 PATCH: 4 CREAM TOPICAL at 18:53

## 2023-01-07 RX ADMIN — LEVETIRACETAM 400 MILLIGRAM(S): 250 TABLET, FILM COATED ORAL at 08:10

## 2023-01-07 NOTE — PROGRESS NOTE ADULT - SUBJECTIVE AND OBJECTIVE BOX
Chief Complaint:  Patient is a 52y old  Male who presents with a chief complaint of Unresponsive (07 Jan 2023 08:50)     Interval Events:   Remains afebrile and HDS, no complaints  Denies N/V/abdominal pain  No BM since procedure  WBC 11.9 form 9.9 on steroids  Heparin restarted    Hospital Medications:  aspirin enteric coated 81 milliGRAM(s) Oral daily  atorvastatin 40 milliGRAM(s) Oral at bedtime  bisacodyl Suppository 10 milliGRAM(s) Rectal daily  chlorhexidine 2% Cloths 1 Application(s) Topical daily  dextrose 50% Injectable 25 Gram(s) IV Push once  dextrose 50% Injectable 12.5 Gram(s) IV Push once  dextrose 50% Injectable 25 Gram(s) IV Push once  dextrose Oral Gel 15 Gram(s) Oral once  diphtheria/tetanus/pertussis (acellular) Vaccine (Adacel) 0.5 milliLiter(s) IntraMuscular once  glucagon  Injectable 1 milliGRAM(s) IntraMuscular once  heparin   Injectable 8000 Unit(s) IV Push every 6 hours PRN  heparin   Injectable 4000 Unit(s) IV Push every 6 hours PRN  heparin  Infusion. 1600 Unit(s)/Hr IV Continuous <Continuous>  lacosamide IVPB 100 milliGRAM(s) IV Intermittent every 12 hours  levETIRAcetam  IVPB 1500 milliGRAM(s) IV Intermittent every 12 hours  lidocaine   4% Patch 1 Patch Transdermal every 24 hours  methylPREDNISolone sodium succinate IVPB 500 milliGRAM(s) IV Intermittent two times a day  multivitamin 1 Tablet(s) Oral daily  oxyCODONE    IR 5 milliGRAM(s) Oral every 6 hours PRN  phenazopyridine 100 milliGRAM(s) Oral every 8 hours PRN  traMADol 25 milliGRAM(s) Oral every 6 hours PRN      ROS:   Complete and normal except as mentioned above.    PHYSICAL EXAM:   Vital Signs:  Vital Signs Last 24 Hrs  T(C): 36.5 (07 Jan 2023 05:58), Max: 37.1 (06 Jan 2023 17:45)  T(F): 97.7 (07 Jan 2023 05:58), Max: 98.7 (06 Jan 2023 17:45)  HR: 80 (07 Jan 2023 05:58) (78 - 96)  BP: 110/75 (07 Jan 2023 05:58) (95/61 - 122/84)  BP(mean): --  RR: 18 (07 Jan 2023 05:58) (15 - 18)  SpO2: 100% (07 Jan 2023 05:58) (96% - 100%)    Parameters below as of 07 Jan 2023 05:58  Patient On (Oxygen Delivery Method): room air      Daily     Daily     GENERAL:  No acute distress  HEENT:  NCAT, no scleral icterus  CHEST: no resp distress  HEART:  RRR  ABDOMEN:  Soft, non-tender, non-distended, normoactive bowel sounds  EXTREMITIES: b/l trace edema  NEURO:  Alert and oriented x 3    LABS: reviewed                        11.6 11.95 )-----------( 154      ( 07 Jan 2023 05:45 )             35.5     01-07    139  |  102  |  20  ----------------------------<  158<H>  3.9   |  24  |  0.77    Ca    9.1      07 Jan 2023 05:45  Phos  4.9     01-07  Mg     2.00     01-07    TPro  7.3  /  Alb  3.5  /  TBili  0.6  /  DBili  x   /  AST  34  /  ALT  50<H>  /  AlkPhos  102  01-06    LIVER FUNCTIONS - ( 06 Jan 2023 02:01 )  Alb: 3.5 g/dL / Pro: 7.3 g/dL / ALK PHOS: 102 U/L / ALT: 50 U/L / AST: 34 U/L / GGT: x             Interval Diagnostic Studies: see sunrise for full report

## 2023-01-07 NOTE — PROGRESS NOTE ADULT - ASSESSMENT
52-year-old male with a PMHx significant for TIAs (2011, 2013), CVAs x3 (02/2022 s/p tPA, 8/3/2022 s/p tPA, 11/5/2022 with residual expressive aphasia) on Eliquis, and HLD BIBEMS after being found unresponsive at home on the floor, last known well 12/1 at around noon. C-collar was placed. Patient was intubated in the ED for airway protection. CTH with no acute findings, vEEG with no identified seizures. Patient was weaned off pressors, extubated, and transitioned to floors 12/4/22. 12/5 am RRT called for seizure like activity with urinary incontinence, tongue biting, and R>L posturing, patient s/p ativan 2mg x3. Anesthesia called to RRT for intubation. MICU accepting patient for seizure like activity requiring intubation.      #AMS, Seizure like activity  EEG 12/7: concern for epileptiform activity; though EEG from 12/5 without such abnormalities.   - f/u neuro recs  - Patient found down and unresponsive at home on 12/2, last known well 12/1 at around noon. Intubated in ED 12/2, extubated in MICU 12/4  - RRT 12/5: seizure like acting with urinary incontinence, convulsions, and tongue biting; s/p ativan 2mg x3 with pauses in seizure activity following each; intubated for airway protection with rocuronium. Prolactin ~75   - CTH and CT cervical spine 12/2 negative for any acute pathologies. Tox screen on admission negative. 12/5: Stable exam from prior CT head, chronic microvascular ischemic changes, parenchymal loss, dystrophic calcification of central portion of alexis unchanged.  - Video EEG 12/3-12/4 without any seizure like activity, moderate to severe nonspecific diffuse or multifocal cerebral dysfunction  - c/w keppra 1500mg q12 maintenance, Keppra loaded 4.5g  - patient extubated again on 12/8th  -MRI noted, chronic CVA, no acute CVA noted     MRI noted , new findings for leptomeningeal disease, R/O sarcoid vs lymphoma   Hematology and rheum eval called  repeat scan per heme    LP done, awaiting cyto , negative fo rmalignant cells   LE DVT study positive eliquis switched to heparin full AC for now   S/P colonoscopy for malignancy W/U  surg eval for LN biopsy if possible         #CVAs/TIAs  - Hx of TIAs in 2011 and 2013, CVAs x3 in 02/22, 8/22, and 11/22 with residual expressive aphasia   - on Eliquis and Aspirin at home for hx of stroke  - PFO work up in past negative, no evidence of PFO on MIMI X2  - c/w ASA   - c/w heparin gtt, was on hold, resume eliquis   - Restart Statin when LFTs and CPK improve per neurology  - Being worked up for Mixed Connective Tissue Disease OP- f/u p-ANCA, c-ANCA/ [id number] negative  - Neuro recs appreciated  - repeat brain MRI noted   - LE pain, check MRI     Plan For repeat LP with cytology   GI for colon thickening seen on CT , plan for colonoscopy next week     # Hypernatremia  tredn Na level   renal eval appreciated  S/P hydraiton, trend Na level     #Thecal sac flattening   - CT thoracic and lumbar spine showing degenerative disc disease T6-T7 through L4-L5 and mild disc bulges at L2-L3 through L4-L5 that flatten the ventral thecal sac and narrowing of the bilateral neural foramina  - Will Monitor for now, will consider neuro/neurosurg evaluation in the future    #HLD  - atorvastatin on hold due to elevated CK and LFTs  - restart when possible for secondary stroke prevention    #?ASD/PFO  - Patient reportedly found to have a PFO in February 2022 during a stroke workup at [place]. Patient presented for a PFO closure in November 2022. Notably, no PFO was found at the time and no intervention was performed by Dr. Lynn.    - TTE 11/5/22 EF 57% and grossly normal LV function  - MIMI performed bedside 12/5 1 of 2 studies (+) on bubble study (uploaded to [facility])  - elevated D d-andrew, Check LE DVT study posotve       #Rhabdomyolysis  - CK 8720 on admission, peaked at 15k, now downtrending  - DDx: prolonged downtime myositis v hyperthermia? (T 105.4 F) v sepsis v seizure induced    - L Ankle adn Knee pain, swelling  S/P Tap last week  cont to have pain and tenderness and swelling  Repeat imaging   Ortho follow up           # Elevated Winston level  CHeck Abd US noted    GI eval PRN   Trend LFTs , normalized, no need for MRCP       #Partial SBO - resolved  - CT chest, abdomen, and pelvis w/ contrast concerning for possible partial SBO without transition point.  - Surgery consulted, no acute intervention at this time  - 12/5 KUB - negative for ileus or SBO  - Hold TF for 12/7 for possible trial of extubation.  - otherwise diet per nutrition      #Leukocytosis  - worsening leukocytosis  - monitor for fever  - Ortho eval fro knee swelling   - Pan Cx  - LP by IR , follow up results         #Concern for sepsis  Unclear source, aspiration v less likely meningitis   BCx (12/2 NGTD repeat 12/5 NGTD) and UC 12/2 NGTD  - Patient initially presented with AMS, T 105.4, tachycardic, and tachypneic   - UA negative  - s/p vanco and zosyn x1 in ED 12/2, ceftriaxone 2g BID 12/3-12/4, vanco 12/3-12/4  - 12/7 DCed Vanc since BCx from 12/5 NGTD  - c/w zosyn for aspiration pna presumed. completed course, monitor   - ID eval appreciated  - febrile again, Ortho for knee asp      # ANemia  noted, no gross bleeding  type and screen  GI eval called   R/O malignancy in vie wof thickening of the colon

## 2023-01-07 NOTE — CONSULT NOTE ADULT - SUBJECTIVE AND OBJECTIVE BOX
GENERAL SURGERY CONSULT NOTE  --------------------------------------------------------------------------------------------      HPI:  52-year-old male with a PMHx significant for TIAs (2011, 2013), CVAs x3 (02/2022 s/p tPA, 8/3/2022 s/p tPA, 11/5/2022 with residual expressive aphasia) on Eliquis, and HLD BIBEMS after being found unresponsive at home. Per patient's 2 friends in the ED, patient was last spoken to around noon on 12/1. No one had heard from him or seen him since yesterday, was not responding to calls this am, so they went to check on him today and they found him unresponsive at home with head on floor turned to the side and one leg was on the bed. Patient was snoring per his friends. Friends called EMS. EMS states he was grunting but minimally responsive. Patient arrived to the ER covered in dried blood in his mouth with multiple ecchymosis on the left side of his face, neck,, chest, and arm. Patient obtunded. Friend denies hx of ETOH or drug use. Of note, patient had a questionable ASD/PFO with possible closure on 11/22/22; per patient's friends no PFO was found and no closure was performed.  Per outpatient records, patient previously on testosterone transdermal solution which was stopped on 11/15 by his urologist. Per patient's friends at bedside, patient was recently prescribed a mail-order testosterone replacement, unsure of the name and unsure if he received and started the medication. At baseline, patient is AOx4, able to ambulate without assistance, and takes care of all ADLs without assistance.     On arrival, patient with rectal temp 105.4 F, tachy to 130s, hypertensive to 180/110, and tachypneic to 30. C-collar was placed. Patient was intubated in the ED for airway protection with etomidate and succinylcholine. Patient sedated with propofol and fentanyl. Started on vanco and zosyn. 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 CT cervical spine and maxillofacial scans negative. CT chest, abdomen, and pelvis w/ contrast concerning for possible partial SBO without transition point. Surgery consulted in ED, no acute surgical intervention at this time. CT thoracic and lumbar spine showing degenerative disc disease T6-T7 through L4-L5 and mild disc bulges at L2-L3 through L4-L5 that flatten the ventral thecal sac and narrowing of the bilateral neural foramina.      ROS: 10-system review is otherwise negative except HPI above.      PAST MEDICAL & SURGICAL HISTORY:  History of TIAs      CVA (cerebrovascular accident)      HLD (hyperlipidemia)      Vertigo      Unresponsiveness      No significant past surgical history        FAMILY HISTORY:  No pertinent family history in first degree relatives    [] Family history not pertinent as reviewed with the patient and family    SOCIAL HISTORY:  n/a    ALLERGIES: No Known Allergies      HOME MEDICATIONS: n/a    CURRENT MEDICATIONS  MEDICATIONS (STANDING): aspirin enteric coated 81 milliGRAM(s) Oral daily  atorvastatin 40 milliGRAM(s) Oral at bedtime  bisacodyl Suppository 10 milliGRAM(s) Rectal daily  dextrose 50% Injectable 25 Gram(s) IV Push once  dextrose 50% Injectable 12.5 Gram(s) IV Push once  dextrose 50% Injectable 25 Gram(s) IV Push once  dextrose Oral Gel 15 Gram(s) Oral once  diphtheria/tetanus/pertussis (acellular) Vaccine (Adacel) 0.5 milliLiter(s) IntraMuscular once  glucagon  Injectable 1 milliGRAM(s) IntraMuscular once  heparin  Infusion. 1600 Unit(s)/Hr IV Continuous <Continuous>  lacosamide IVPB 100 milliGRAM(s) IV Intermittent every 12 hours  levETIRAcetam  IVPB 1500 milliGRAM(s) IV Intermittent every 12 hours  methylPREDNISolone sodium succinate IVPB 500 milliGRAM(s) IV Intermittent two times a day  multivitamin 1 Tablet(s) Oral daily    MEDICATIONS (PRN):heparin   Injectable 8000 Unit(s) IV Push every 6 hours PRN For aPTT less than 40  heparin   Injectable 4000 Unit(s) IV Push every 6 hours PRN For aPTT between 40 - 57  oxyCODONE    IR 5 milliGRAM(s) Oral every 6 hours PRN moderate and severe pain  phenazopyridine 100 milliGRAM(s) Oral every 8 hours PRN dysuria  traMADol 25 milliGRAM(s) Oral every 6 hours PRN Moderate Pain (4 - 6)    --------------------------------------------------------------------------------------------    Vitals:   T(C): 36.7 (01-07-23 @ 12:59), Max: 36.7 (01-07-23 @ 12:59)  HR: 74 (01-07-23 @ 12:59) (74 - 85)  BP: 119/72 (01-07-23 @ 12:59) (109/71 - 119/72)  RR: 18 (01-07-23 @ 12:59) (17 - 18)  SpO2: 97% (01-07-23 @ 12:59) (97% - 100%)  CAPILLARY BLOOD GLUCOSE          Height (cm): 190.5 (01-06 @ 07:22)  Weight (kg): 95.7 (01-06 @ 07:22)  BMI (kg/m2): 26.4 (01-06 @ 07:22)  BSA (m2): 2.24 (01-06 @ 07:22)    PHYSICAL EXAM  General: NAD, Lying in bed comfortably  Resp: Good effort, CTA b/l  GI/Abd: Soft, NT/ND, no rebound/guarding, no masses palpated  Vascular: All 4 extremities warm.  --------------------------------------------------------------------------------------------    LABS  CBC (01-07 @ 05:45)                              11.6<L>                         11.95<H>  )----------------(  154        89.9<H>% Neutrophils, 7.4<L>% Lymphocytes, ANC: 10.74<H>                              35.5<L>  CBC (01-06 @ 23:22)                              11.3<L>                         9.90    )----------------(  166        --    % Neutrophils, --    % Lymphocytes, ANC: --                                  33.6<L>    BMP (01-07 @ 05:45)             139     |  102     |  20    		Ca++ --      Ca 9.1                ---------------------------------( 158<H>		Mg 2.00               3.9     |  24      |  0.77  			Ph 4.9<H>      Coags (01-07 @ 15:45)  aPTT 68.8<H> / INR -- / PT --  Coags (01-07 @ 05:45)  aPTT 135.9 / INR -- / PT --          --------------------------------------------------------------------------------------------    MICROBIOLOGY    -> .CSF CSF Culture (01-04 @ 09:50)       No polymorphonuclear cells seen  No organisms seen  by cytocentrifuge    NG    No growth      --------------------------------------------------------------------------------------------    IMAGING  < from: CT Abdomen and Pelvis w/ IV Cont (01.01.23 @ 19:33) >  FINDINGS:  CHEST:  LUNGS AND LARGE AIRWAYS: Patent central airways. 3 mm right upper lobe   nodule (2:93) and 4 mm left lower lobesubpleural nodule (2:75).   Triangular-shaped left fissural nodule, likely lymph node. Mild right   lower lobe tree-in-bud opacities, likely distal mucoid impacted airways.   Bilateral lower lobe dependent atelectasis.  PLEURA: No pleural effusion.  VESSELS: Within normal limits.  HEART: Heart size is normal. No pericardial effusion.  MEDIASTINUM AND LUCIANA: No lymphadenopathy.  CHEST WALL AND LOWER NECK: Left chest wall loop recorder.    ABDOMEN AND PELVIS:  LIVER: Within normal limits.  BILE DUCTS: Normal caliber.  GALLBLADDER: Within normal limits.  SPLEEN: Within normal limits.  PANCREAS: Within normal limits.  ADRENALS: Within normal limits.  KIDNEYS/URETERS: Within normal limits.    BLADDER: Within normal limits.  REPRODUCTIVE ORGANS: Mildly enlarged prostate gland.    BOWEL: No bowel obstruction. Appendix is normal. Question thickening   versus underdistention of the rectum. Minimal perirectal stranding.   Recommend direct visualization if not recently performed.  PERITONEUM: No ascites.  VESSELS: Within normal limits.  RETROPERITONEUM/LYMPH NODES: Prominent left external iliac nodes, for   reference node measuring 1.2 x 0.8 cm (2:270).  ABDOMINAL WALL: Fat-containing left inguinal hernia. Small fat-containing   umbilical hernia.  BONES: Degenerative changes. No aggressive osseous lesion.    IMPRESSION:  Question thickening versus underdistention of the rectum, direct   visualization if not recently performed.    Subcentimeter pelvic lymph nodes.        --- End of Report ---          HALIMA PACE MD; Resident Radiologist  This document has been electronically signed.  EROS HUFFMAN MD; Attending Radiologist  This document has been electronically signed. Jan 2 2023 10:33AM    < end of copied text >      --------------------------------------------------------------------------------------------

## 2023-01-07 NOTE — PROGRESS NOTE ADULT - SUBJECTIVE AND OBJECTIVE BOX
Subjective: Patient seen and examined. No new events except as noted.     REVIEW OF SYSTEMS:    CONSTITUTIONAL: + weakness, fevers or chills  EYES/ENT: No visual changes;  No vertigo or throat pain   NECK: No pain or stiffness  RESPIRATORY: No cough, wheezing, hemoptysis; No shortness of breath  CARDIOVASCULAR: No chest pain or palpitations  GASTROINTESTINAL: No abdominal or epigastric pain. No nausea, vomiting, or hematemesis; No diarrhea or constipation. No melena or hematochezia.  GENITOURINARY: No dysuria, frequency or hematuria  NEUROLOGICAL: No numbness or weakness  SKIN: No itching, burning, rashes, or lesions   All other review of systems is negative unless indicated above.    MEDICATIONS:  MEDICATIONS  (STANDING):  aspirin enteric coated 81 milliGRAM(s) Oral daily  atorvastatin 40 milliGRAM(s) Oral at bedtime  bisacodyl Suppository 10 milliGRAM(s) Rectal daily  chlorhexidine 2% Cloths 1 Application(s) Topical daily  dextrose 50% Injectable 25 Gram(s) IV Push once  dextrose 50% Injectable 25 Gram(s) IV Push once  dextrose 50% Injectable 12.5 Gram(s) IV Push once  dextrose Oral Gel 15 Gram(s) Oral once  diphtheria/tetanus/pertussis (acellular) Vaccine (Adacel) 0.5 milliLiter(s) IntraMuscular once  glucagon  Injectable 1 milliGRAM(s) IntraMuscular once  lacosamide IVPB 100 milliGRAM(s) IV Intermittent every 12 hours  levETIRAcetam  IVPB 1500 milliGRAM(s) IV Intermittent every 12 hours  lidocaine   4% Patch 1 Patch Transdermal every 24 hours  methylPREDNISolone sodium succinate IVPB 500 milliGRAM(s) IV Intermittent two times a day  multivitamin 1 Tablet(s) Oral daily    PHYSICAL EXAM:  Vital Signs Last 24 Hrs  T(C): 36.5 (07 Jan 2023 05:58), Max: 37.1 (06 Jan 2023 17:45)  T(F): 97.7 (07 Jan 2023 05:58), Max: 98.7 (06 Jan 2023 17:45)  HR: 80 (07 Jan 2023 05:58) (78 - 96)  BP: 110/75 (07 Jan 2023 05:58) (95/61 - 122/84)  BP(mean): --  RR: 18 (07 Jan 2023 05:58) (15 - 18)  SpO2: 100% (07 Jan 2023 05:58) (96% - 100%)    Parameters below as of 07 Jan 2023 05:58  Patient On (Oxygen Delivery Method): room air    I&O's Summary    General Appearance: NAD    Head: Normocephalic, atraumatic and no dysmorphic features  Ear, Nose, and Throat: Moist mucous membranes    CVS: S1S2+  Resp: No SOB, no wheeze or rhonchi  Abd: soft NTND  Extremities: No edema, no cyanosis  Skin: No bruises, no rashes  Neurological Exam:    Mental Status: Awake, Oriented x 2-3, able to follow simple and complex verbal commands. answers questions appropriately, able to name, repeat and recall. no aphasia, mild dysarthria (tongue bite)   Cranial Nerves: EOMI, PERRLA, VFF, V1-V3 sensation intact, no facial asymmetry, tongue midline, hearing intact B/L, shoulder shrug appropriate, SCM/trap intact.   Motor: Moving uppers > lowers. uppers at least 4+-5/5 ; lowers R>L 3-4/5   Sensation: intact to LT and PP   Reflexes: 1+ throughout at biceps, brachioradialis, triceps, patellars and ankles bilaterally and equal. No clonus. R toe and L toe were both downgoing.  Coordination: no dysmetria   Gait: unable     LABS:    CARDIAC MARKERS:                        11.6   11.95 )-----------( 154      ( 07 Jan 2023 05:45 )             35.5     01-07    139  |  102  |  20  ----------------------------<  158<H>  3.9   |  24  |  0.77    Ca    9.1      07 Jan 2023 05:45  Phos  4.9     01-07  Mg     2.00     01-07    TPro  7.3  /  Alb  3.5  /  TBili  0.6  /  DBili  x   /  AST  34  /  ALT  50<H>  /  AlkPhos  102  01-06    proBNP:   Lipid Profile:   HgA1c:   TSH:     TELEMETRY: 	    ECG:  	  RADIOLOGY:   DIAGNOSTIC TESTING:  [ ] Echocardiogram:  [ ]  Catheterization:  [ ] Stress Test:    OTHER:

## 2023-01-07 NOTE — PROVIDER CONTACT NOTE (CRITICAL VALUE NOTIFICATION) - ACTION/TREATMENT ORDERED:
provider notified.  Will continue to follow heparin nomogram
provider notified.  Will continue to follow heparin nomogram
Provider notified.
Provider notified. Daniel continue to follow Heparin Nomogram

## 2023-01-07 NOTE — PROGRESS NOTE ADULT - PROBLEM SELECTOR PLAN 4
Hx of TIAs in 2011 and 2013, CVAs x3 in 02/22, 8/22, and 11/22 with residual expressive aphasia    - on ASA/Eliquis (eliquis on hold)  on hep gtt  s/p colonoscopy 1/6 - benign polyp    - consideration for LN biopsy of pelvic LN - acceptable cardiac risk to proceed

## 2023-01-07 NOTE — PROGRESS NOTE ADULT - SUBJECTIVE AND OBJECTIVE BOX
Name of Patient : TAMI DUNHAM  MRN: [id number]  Date of visit: 01-07-23       Subjective: Patient seen and examined. No new events except as noted.   calm     REVIEW OF SYSTEMS:    CONSTITUTIONAL: No weakness, fevers or chills  EYES/ENT: No visual changes;  No vertigo or throat pain   NECK: No pain or stiffness  RESPIRATORY: No cough, wheezing, hemoptysis; No shortness of breath  CARDIOVASCULAR: No chest pain or palpitations  GASTROINTESTINAL: No abdominal or epigastric pain. No nausea, vomiting, or hematemesis; No diarrhea or constipation. No melena or hematochezia.  GENITOURINARY: No dysuria, frequency or hematuria  NEUROLOGICAL: No numbness or weakness  SKIN: No itching, burning, rashes, or lesions   All other review of systems is negative unless indicated above.    MEDICATIONS:  MEDICATIONS  (STANDING):  aspirin enteric coated 81 milliGRAM(s) Oral daily  atorvastatin 40 milliGRAM(s) Oral at bedtime  bisacodyl Suppository 10 milliGRAM(s) Rectal daily  chlorhexidine 2% Cloths 1 Application(s) Topical daily  dextrose 50% Injectable 25 Gram(s) IV Push once  dextrose 50% Injectable 12.5 Gram(s) IV Push once  dextrose 50% Injectable 25 Gram(s) IV Push once  dextrose Oral Gel 15 Gram(s) Oral once  diphtheria/tetanus/pertussis (acellular) Vaccine (Adacel) 0.5 milliLiter(s) IntraMuscular once  glucagon  Injectable 1 milliGRAM(s) IntraMuscular once  heparin  Infusion. 1600 Unit(s)/Hr (16 mL/Hr) IV Continuous <Continuous>  lacosamide IVPB 100 milliGRAM(s) IV Intermittent every 12 hours  levETIRAcetam  IVPB 1500 milliGRAM(s) IV Intermittent every 12 hours  lidocaine   4% Patch 1 Patch Transdermal every 24 hours  methylPREDNISolone sodium succinate IVPB 500 milliGRAM(s) IV Intermittent two times a day  multivitamin 1 Tablet(s) Oral daily      PHYSICAL EXAM:  T(C): 36.7 (01-07-23 @ 22:24), Max: 36.7 (01-07-23 @ 12:59)  HR: 76 (01-07-23 @ 22:24) (74 - 85)  BP: 127/69 (01-07-23 @ 22:24) (109/71 - 127/69)  RR: 17 (01-07-23 @ 22:24) (17 - 18)  SpO2: 96% (01-07-23 @ 22:24) (96% - 100%)  Wt(kg): --  I&O's Summary        Appearance: Normal	  HEENT:  PERRLA   Lymphatic: No lymphadenopathy   Cardiovascular: Normal S1 S2, no JVD  Respiratory: normal effort , clear  Gastrointestinal:  Soft, Non-tender  Skin: No rashes,  warm to touch  Psychiatry:  Mood & affect appropriate  Musculuskeletal: No edema      All labs, Imaging and EKGs personally reviewed                             11.6   11.95 )-----------( 154      ( 07 Jan 2023 05:45 )             35.5               01-07    139  |  102  |  20  ----------------------------<  158<H>  3.9   |  24  |  0.77    Ca    9.1      07 Jan 2023 05:45  Phos  4.9     01-07  Mg     2.00     01-07    TPro  7.3  /  Alb  3.5  /  TBili  0.6  /  DBili  x   /  AST  34  /  ALT  50<H>  /  AlkPhos  102  01-06    PT/INR - ( 06 Jan 2023 02:01 )   PT: 12.5 sec;   INR: 1.08 ratio         PTT - ( 07 Jan 2023 15:45 )  PTT:68.8 sec

## 2023-01-07 NOTE — PROGRESS NOTE ADULT - ATTENDING COMMENTS
Doing well post procedure, benign exam.  Plan for repeat colon in 3 months for polyp resection  should follow as outpatient - [phone]  Gi to s/o, call with questions

## 2023-01-07 NOTE — CONSULT NOTE ADULT - ATTENDING COMMENTS
Date of service 1/7    52M with prolonged hospitalization, recently underwent colonoscopy due to thickening in rectum found on CT and now with need for possible malignant workup. Had CT scan that showed enlarged external iliac node. Surgery called for possible bx    CT reviewed, external iliac node enlarged    Exam:  abd soft  BL groin without any superficial palpable adenopathy     The external iliac node is not surgically accessible for biopsy due to its deep location and proximity to the vessel  There is no significant adenopathy in the BL groin that would be amenable to open biopsy  Can obtain US of the groin to further evaluate to see if any superficial adenopathy is present  Can consider IR core bx if adenopathy identified on imaging  D/w medicine team
52 year old male with history of TIAs (2011, 2013), CVAs x3 (02/2022 s/p tPA, 8/3/2022 s/p tPA, 11/5/2022 with residual expressive aphasia) on Eliquis, and HLD BIBEMS after being found unresponsive at home. Patient initially hospitalized after being found down at home with hospital course c/b seizures, intubation for airway protection, partial SBO on CT imaging, fevers, anemia, and hyperbilirubinemia.   still currently febrile    No plans for urgent EGD/PEG  continue alternate means of nutrition while febrile
Agree with above and note modified by attending  In brief      #Leptomeningeal enhancement   -differentials: sarcoidosis, IgG4-RD, lymphoma, lupus cerebritis  and CLIPPERS  - follow up blood work   - check accessiblity of focal areas of enhancement for biopsy      #Left foot drop   - structural vs neurologic disease  - follow up MRI ankle (left)     Linda Muir MD  [phone]
DATE OF SERVICE: 12-13-22 @ 13:17    Agree with Resident note above    52M admitted with seizures. During workup found to have leukocytosis as well as elevated bilirubin. He denies abdominal pain at this time, says sometimes he has discomfort but not specific regarding pain.    vitals reviewed  labs: Leukocytosis wbc 26, bili 2.1/1.1; LFTs remainder normal  US w cholelithiasis, CBD 5mm    Abdomen soft NT ND    Continue to trend LFTs  can obtain MRCP to better characterize the duct and r/o choledocholithiasis  Do not feel there is cholecystitis at this time  Will follow
Patient seen with ortho housestaff.  Left knee hemarthrosis.  Fluid sent for gm stain/culture.  Followup on cultures.  ID consulting.
#Abnormal CT with thickening vs underdistention of rectum  #TIAs, CVAs on Eliquis  #Leptomeningeal enhancement    --If patient remains optimized and otherwise medically stable, will tentatively plan for colonoscopy for workup of underlying malignancy and to evaluate recent CT findings  --OK to continue ASA, management of heparin drip as outlined above

## 2023-01-07 NOTE — PROGRESS NOTE ADULT - ASSESSMENT
Estiven Simeon is a 52-year-old  male presented with PMHx of TIAs (2011, 2013), CVAs x3 (2/2022, 8/3/2022, 11/5/2022) w/residual expressive aphasia on Eliquis, dyslipidemia and questionable ASD/PFO admitted after patient was found unresponsive at home on 12/2/2022 with a prolonged hospital course as outlined below/ GI consulted for consideration of a colonoscopy for further evaluation of rectal thickening noted on imaging and workup of possible colon cancer.     #Question thickening versus underdistention of the rectum, likely underdistention given colonoscopy findings below  - Pt incidentally found to have question thickening versus underdistention of the rectum on CT of the [place] for further workup of new findings for leptomeningeal disease noted on MRI. Pt without any underlying GI symptoms including n/v/abdominal pain or ongoing diarrhea. No prior c-scope in the past. CT findings are overall relatively non-specific although can attempt colonoscopy to r/o any underlying occult colon cancer accounting for his symptoms.   - s/p colonoscopy 1/6 with non-bleeding internal hemorrhoids, one 15 mm benign-appearing polyp in the mid sigmoid colon s/p bx, on e3 mm polyp in cecum, sigmoid diverticulosis, no large masses    Recommendations:  - No additional intervention from GI indicated at this time  - Will need repeat colonoscopy in 3-6 mos or when able to hold AC for polyp removal  - Okay to continue hep gtt in the meantime  - Follow up path report  - Please provide patient with Gastroenterology Clinic for outpatient follow up;   [phone] ([facility] at [address] [place] [address])   - Rest of care per primary    Preliminary note until signed by Attending.    Thank you for involving us in this patient's care. Please reach out to GI if any further questions or concerns.    Karen Nguyen MD  Gastroenterology/Hepatology Fellow, PGY-VI    NON-URGENT CONSULTS:  Please email [email or web address]  AT NIGHT AND ON WEEKENDS:  Contact on-call GI fellow via answering service ([phone]) from 5pm-8am and on weekends/holidays  MONDAY-FRIDAY 8AM-5PM:  Pager# [phone] ([facility])  GI Phone# [phone] ([facility])    Estiven Simeon is a 52-year-old  male presented with PMHx of TIAs (2011, 2013), CVAs x3 (2/2022, 8/3/2022, 11/5/2022) w/residual expressive aphasia on Eliquis, dyslipidemia and questionable ASD/PFO admitted after patient was found unresponsive at home on 12/2/2022 with a prolonged hospital course as outlined below/ GI consulted for consideration of a colonoscopy for further evaluation of rectal thickening noted on imaging and workup of possible colon cancer.     #Question thickening versus underdistention of the rectum, likely underdistention given colonoscopy findings below  - Pt incidentally found to have question thickening versus underdistention of the rectum on CT of the [facility] for further workup of new findings for leptomeningeal disease noted on MRI. Pt without any underlying GI symptoms including n/v/abdominal pain or ongoing diarrhea. No prior c-scope in the past. CT findings are overall relatively non-specific although can attempt colonoscopy to r/o any underlying occult colon cancer accounting for his symptoms.   - s/p colonoscopy 1/6 with non-bleeding internal hemorrhoids, one 15 mm benign-appearing polyp in the mid sigmoid colon s/p bx, on e3 mm polyp in cecum, sigmoid diverticulosis, no large masses    Recommendations:  - No additional intervention from GI indicated at this time  - Will need repeat colonoscopy in 3-6 mos or when able to hold AC for polyp removal  - Okay to continue hep gtt in the meantime  - Follow up path report  - Please provide patient with Gastroenterology Clinic for outpatient follow up will assist with follow up appt;   [phone] ([facility] at [address] [place] [address])   - Rest of care per primary    Preliminary note until signed by Attending.    Thank you for involving us in this patient's care. Please reach out to GI if any further questions or concerns. Signing off.    Karen Nguyen MD  Gastroenterology/Hepatology Fellow, PGY-VI    NON-URGENT CONSULTS:  Please email [email or web address]  [email or web address]  AT NIGHT AND ON WEEKENDS:  Contact on-call GI fellow via answering service ([phone]) from 5pm-8am and on weekends/holidays  MONDAY-FRIDAY 8AM-5PM:  Pager# [phone] ([facility])  GI Phone# [phone] ([facility])    Estiven Simeon is a 52-year-old  male presented with PMHx of TIAs (2011, 2013), CVAs x3 (2/2022, 8/3/2022, 11/5/2022) w/residual expressive aphasia on Eliquis, dyslipidemia and questionable ASD/PFO admitted after patient was found unresponsive at home on 12/2/2022 with a prolonged hospital course as outlined below/ GI consulted for consideration of a colonoscopy for further evaluation of rectal thickening noted on imaging and workup of possible colon cancer.     #Question thickening versus underdistention of the rectum, likely underdistention given colonoscopy findings below  - Pt incidentally found to have question thickening versus underdistention of the rectum on CT of the AP for further workup of new findings for leptomeningeal disease noted on MRI. Pt without any underlying GI symptoms including n/v/abdominal pain or ongoing diarrhea. No prior c-scope in the past. CT findings are overall relatively non-specific although can attempt colonoscopy to r/o any underlying occult colon cancer accounting for his symptoms.   - s/p colonoscopy 1/6 with non-bleeding internal hemorrhoids, one 15 mm benign-appearing polyp in the mid sigmoid colon s/p bx, one 3 mm polyp in cecum, sigmoid diverticulosis, no large masses    Recommendations:  - No additional intervention from GI indicated at this time  - Will need repeat colonoscopy in 3-6 mos or when able to hold AC for polyp removal  - Okay to continue hep gtt in the meantime  - Follow up path report  - Please provide patient with Gastroenterology Clinic for outpatient follow up will assist with follow up appt;   [phone] ([facility] at [address] [place] [address])   - Rest of care per primary    Preliminary note until signed by Attending.    Thank you for involving us in this patient's care. Please reach out to GI if any further questions or concerns. Signing off.    Karen Nguyen MD  Gastroenterology/Hepatology Fellow, PGY-VI    NON-URGENT CONSULTS:  Please email [email or web address]  [email or web address]  AT NIGHT AND ON WEEKENDS:  Contact on-call GI fellow via answering service ([phone]) from 5pm-8am and on weekends/holidays  MONDAY-FRIDAY 8AM-5PM:  Pager# [phone] ([facility])  GI Phone# [phone] ([facility])

## 2023-01-07 NOTE — CONSULT NOTE ADULT - ASSESSMENT
ASSESSMENT: 52-year-old male with a PMHx significant for TIAs (2011, 2013), CVAs x3 (02/2022 s/p tPA, 8/3/2022 s/p tPA, 11/5/2022 with residual expressive aphasia) on Eliquis, and HLD BIBEMS after being found unresponsive at home on the floor, last known well 12/1 at around noon. C-collar was placed. Patient was intubated in the ED for airway protection. CTH with no acute findings, vEEG with no identified seizures. Patient was weaned off pressors, extubated, and transitioned to floors 12/4/22. 12/5 am RRT called for seizure like activity with urinary incontinence, tongue biting, and R>L posturing, patient s/p ativan 2mg x3. Anesthesia called to RRT for intubation. MICU accepting patient for seizure like activity requiring intubation.    General Surgery consulted to R/O malignancy in given wall thickening of the colon on CT scan       PLAN:    - no acute surgical intervention indicated  - recommend US of b/l groins to check for lymph nodes in lieu of LN biopsy planning  - will follow     - Plan discussed with Attending, Dr. Johana BOJORQUEZ Team Surgery  [id number]

## 2023-01-08 LAB
ANION GAP SERPL CALC-SCNC: 9 MMOL/L — SIGNIFICANT CHANGE UP (ref 7–14)
APTT BLD: 78.4 SEC — HIGH (ref 27–36.3)
BASOPHILS # BLD AUTO: 0.01 K/UL — SIGNIFICANT CHANGE UP (ref 0–0.2)
BASOPHILS NFR BLD AUTO: 0.1 % — SIGNIFICANT CHANGE UP (ref 0–2)
BUN SERPL-MCNC: 20 MG/DL — SIGNIFICANT CHANGE UP (ref 7–23)
CALCIUM SERPL-MCNC: 8.5 MG/DL — SIGNIFICANT CHANGE UP (ref 8.4–10.5)
CHLORIDE SERPL-SCNC: 104 MMOL/L — SIGNIFICANT CHANGE UP (ref 98–107)
CO2 SERPL-SCNC: 25 MMOL/L — SIGNIFICANT CHANGE UP (ref 22–31)
CREAT SERPL-MCNC: 0.73 MG/DL — SIGNIFICANT CHANGE UP (ref 0.5–1.3)
EGFR: 109 ML/MIN/1.73M2 — SIGNIFICANT CHANGE UP
EOSINOPHIL # BLD AUTO: 0 K/UL — SIGNIFICANT CHANGE UP (ref 0–0.5)
EOSINOPHIL NFR BLD AUTO: 0 % — SIGNIFICANT CHANGE UP (ref 0–6)
GLUCOSE BLDC GLUCOMTR-MCNC: 149 MG/DL — HIGH (ref 70–99)
GLUCOSE SERPL-MCNC: 149 MG/DL — HIGH (ref 70–99)
HCT VFR BLD CALC: 31.7 % — LOW (ref 39–50)
HGB BLD-MCNC: 10.6 G/DL — LOW (ref 13–17)
IANC: 12.69 K/UL — HIGH (ref 1.8–7.4)
IMM GRANULOCYTES NFR BLD AUTO: 1.2 % — HIGH (ref 0–0.9)
LYMPHOCYTES # BLD AUTO: 0.97 K/UL — LOW (ref 1–3.3)
LYMPHOCYTES # BLD AUTO: 6.8 % — LOW (ref 13–44)
MAGNESIUM SERPL-MCNC: 2 MG/DL — SIGNIFICANT CHANGE UP (ref 1.6–2.6)
MCHC RBC-ENTMCNC: 30 PG — SIGNIFICANT CHANGE UP (ref 27–34)
MCHC RBC-ENTMCNC: 33.4 GM/DL — SIGNIFICANT CHANGE UP (ref 32–36)
MCV RBC AUTO: 89.8 FL — SIGNIFICANT CHANGE UP (ref 80–100)
MONOCYTES # BLD AUTO: 0.51 K/UL — SIGNIFICANT CHANGE UP (ref 0–0.9)
MONOCYTES NFR BLD AUTO: 3.6 % — SIGNIFICANT CHANGE UP (ref 2–14)
NEUTROPHILS # BLD AUTO: 12.69 K/UL — HIGH (ref 1.8–7.4)
NEUTROPHILS NFR BLD AUTO: 88.3 % — HIGH (ref 43–77)
NRBC # BLD: 0 /100 WBCS — SIGNIFICANT CHANGE UP (ref 0–0)
NRBC # FLD: 0 K/UL — SIGNIFICANT CHANGE UP (ref 0–0)
PHOSPHATE SERPL-MCNC: 4.1 MG/DL — SIGNIFICANT CHANGE UP (ref 2.5–4.5)
PLATELET # BLD AUTO: 157 K/UL — SIGNIFICANT CHANGE UP (ref 150–400)
POTASSIUM SERPL-MCNC: 3.9 MMOL/L — SIGNIFICANT CHANGE UP (ref 3.5–5.3)
POTASSIUM SERPL-SCNC: 3.9 MMOL/L — SIGNIFICANT CHANGE UP (ref 3.5–5.3)
RBC # BLD: 3.53 M/UL — LOW (ref 4.2–5.8)
RBC # FLD: 14.2 % — SIGNIFICANT CHANGE UP (ref 10.3–14.5)
SODIUM SERPL-SCNC: 138 MMOL/L — SIGNIFICANT CHANGE UP (ref 135–145)
WBC # BLD: 14.35 K/UL — HIGH (ref 3.8–10.5)
WBC # FLD AUTO: 14.35 K/UL — HIGH (ref 3.8–10.5)

## 2023-01-08 RX ADMIN — HEPARIN SODIUM 1500 UNIT(S)/HR: 5000 INJECTION INTRAVENOUS; SUBCUTANEOUS at 23:36

## 2023-01-08 RX ADMIN — Medication 108 MILLIGRAM(S): at 06:34

## 2023-01-08 RX ADMIN — Medication 10 MILLIGRAM(S): at 11:56

## 2023-01-08 RX ADMIN — OXYCODONE HYDROCHLORIDE 5 MILLIGRAM(S): 5 TABLET ORAL at 21:31

## 2023-01-08 RX ADMIN — Medication 1 TABLET(S): at 11:55

## 2023-01-08 RX ADMIN — HEPARIN SODIUM 1500 UNIT(S)/HR: 5000 INJECTION INTRAVENOUS; SUBCUTANEOUS at 07:27

## 2023-01-08 RX ADMIN — OXYCODONE HYDROCHLORIDE 5 MILLIGRAM(S): 5 TABLET ORAL at 11:55

## 2023-01-08 RX ADMIN — HEPARIN SODIUM 1500 UNIT(S)/HR: 5000 INJECTION INTRAVENOUS; SUBCUTANEOUS at 05:27

## 2023-01-08 RX ADMIN — LEVETIRACETAM 400 MILLIGRAM(S): 250 TABLET, FILM COATED ORAL at 06:14

## 2023-01-08 RX ADMIN — Medication 81 MILLIGRAM(S): at 11:56

## 2023-01-08 RX ADMIN — HEPARIN SODIUM 1500 UNIT(S)/HR: 5000 INJECTION INTRAVENOUS; SUBCUTANEOUS at 19:15

## 2023-01-08 RX ADMIN — CHLORHEXIDINE GLUCONATE 1 APPLICATION(S): 213 SOLUTION TOPICAL at 11:56

## 2023-01-08 RX ADMIN — LEVETIRACETAM 400 MILLIGRAM(S): 250 TABLET, FILM COATED ORAL at 17:15

## 2023-01-08 RX ADMIN — OXYCODONE HYDROCHLORIDE 5 MILLIGRAM(S): 5 TABLET ORAL at 22:30

## 2023-01-08 RX ADMIN — LACOSAMIDE 120 MILLIGRAM(S): 50 TABLET ORAL at 05:32

## 2023-01-08 RX ADMIN — LACOSAMIDE 120 MILLIGRAM(S): 50 TABLET ORAL at 17:13

## 2023-01-08 RX ADMIN — ATORVASTATIN CALCIUM 40 MILLIGRAM(S): 80 TABLET, FILM COATED ORAL at 21:30

## 2023-01-08 NOTE — PROGRESS NOTE ADULT - ASSESSMENT
51yo male with left foot/ankle pain, weakness  ·	Patient was seen and evaluated  ·	Currently in night splint, will help avoid contracture (adjust fitting please ensure keeping at 90 degree angle), still believe proximally derived, at this juncture PT and avoiding contracture are paramount to avoid long term rigid equinus  ·	No signs of infection at the foot and ankle level currently, eschar incidental and stable to left foot  ·	Will follow

## 2023-01-08 NOTE — PROGRESS NOTE ADULT - ATTENDING COMMENTS
DATE OF SERVICE: 01-08-23 @ 15:36    No events  No complaints  Groin without significant superficial adenopathy     Can obtain US groin and possible IR bx for tissue sample if indicated  Continue medical team mgt

## 2023-01-08 NOTE — PROGRESS NOTE ADULT - SUBJECTIVE AND OBJECTIVE BOX
SUBJECTIVE/ OVERNIGHT EVENTS:  --- Coverage for Dr. Diez ---  No overnight events.  No cp/sob/n/v/d.  No HA/dizziness.  No abdominal pain.       --------------------------------------------------------------------------------------------  LABS:                        10.6   14.35 )-----------( 157      ( 08 Jan 2023 06:48 )             31.7     01-08    138  |  104  |  20  ----------------------------<  149<H>  3.9   |  25  |  0.73    Ca    8.5      08 Jan 2023 06:48  Phos  4.1     01-08  Mg     2.00     01-08      PTT - ( 08 Jan 2023 11:32 )  PTT:78.4 sec  CAPILLARY BLOOD GLUCOSE      POCT Blood Glucose.: 149 mg/dL (08 Jan 2023 17:54)            RADIOLOGY & ADDITIONAL TESTS:    Imaging Personally Reviewed:  [x] YES  [ ] NO    Consultant(s) Notes Reviewed:  [x] YES  [ ] NO    MEDICATIONS  (STANDING):  aspirin enteric coated 81 milliGRAM(s) Oral daily  atorvastatin 40 milliGRAM(s) Oral at bedtime  bisacodyl Suppository 10 milliGRAM(s) Rectal daily  chlorhexidine 2% Cloths 1 Application(s) Topical daily  dextrose 50% Injectable 25 Gram(s) IV Push once  dextrose 50% Injectable 12.5 Gram(s) IV Push once  dextrose 50% Injectable 25 Gram(s) IV Push once  dextrose Oral Gel 15 Gram(s) Oral once  diphtheria/tetanus/pertussis (acellular) Vaccine (Adacel) 0.5 milliLiter(s) IntraMuscular once  glucagon  Injectable 1 milliGRAM(s) IntraMuscular once  heparin  Infusion. 1600 Unit(s)/Hr (16 mL/Hr) IV Continuous <Continuous>  lacosamide IVPB 100 milliGRAM(s) IV Intermittent every 12 hours  levETIRAcetam  IVPB 1500 milliGRAM(s) IV Intermittent every 12 hours  lidocaine   4% Patch 1 Patch Transdermal every 24 hours  multivitamin 1 Tablet(s) Oral daily    MEDICATIONS  (PRN):  heparin   Injectable 8000 Unit(s) IV Push every 6 hours PRN For aPTT less than 40  heparin   Injectable 4000 Unit(s) IV Push every 6 hours PRN For aPTT between 40 - 57  oxyCODONE    IR 5 milliGRAM(s) Oral every 6 hours PRN moderate and severe pain  phenazopyridine 100 milliGRAM(s) Oral every 8 hours PRN dysuria  traMADol 25 milliGRAM(s) Oral every 6 hours PRN Moderate Pain (4 - 6)      Care Discussed with Consultants/Other Providers [x] YES  [ ] NO    Vital Signs Last 24 Hrs  T(C): 36.6 (08 Jan 2023 05:23), Max: 36.7 (07 Jan 2023 22:24)  T(F): 97.9 (08 Jan 2023 05:23), Max: 98 (07 Jan 2023 22:24)  HR: 61 (08 Jan 2023 05:23) (61 - 76)  BP: 112/74 (08 Jan 2023 05:23) (112/74 - 127/69)  BP(mean): --  RR: 18 (08 Jan 2023 05:23) (17 - 18)  SpO2: 100% (08 Jan 2023 05:23) (96% - 100%)    Parameters below as of 08 Jan 2023 05:23  Patient On (Oxygen Delivery Method): room air      I&O's Summary      PHYSICAL EXAM:  GENERAL: NAD, well-developed, comfortable, baseline expressive aphsia, mild   HEAD:  Atraumatic, Normocephalic  EYES: EOMI, PERRLA, conjunctiva and sclera clear  NECK: Supple, No JVD  CHEST/LUNG: mild decrease breath sounds bilaterally; No wheeze   HEART: Regular rate and rhythm; No murmurs, rubs, or gallops  ABDOMEN: Soft, Nontender, Nondistended; Bowel sounds present  Neuro: AAOx3, no focal weakness, 5/5 b/l extremity strength  EXTREMITIES:  2+ Peripheral Pulses, No clubbing, cyanosis, or edema. left foot drip, with foot support in place  SKIN: No rashes or lesions

## 2023-01-08 NOTE — PROGRESS NOTE ADULT - ASSESSMENT
ASSESSMENT: 52-year-old male with a PMHx significant for TIAs (2011, 2013), CVAs x3 (02/2022 s/p tPA, 8/3/2022 s/p tPA, 11/5/2022 with residual expressive aphasia) on Eliquis, and HLD BIBEMS after being found unresponsive at home on the floor, last known well 12/1 at around noon. C-collar was placed. Patient was intubated in the ED for airway protection. CTH with no acute findings, vEEG with no identified seizures. Patient was weaned off pressors, extubated, and transitioned to floors 12/4/22. 12/5 am RRT called for seizure like activity with urinary incontinence, tongue biting, and R>L posturing, patient s/p ativan 2mg x3. Anesthesia called to RRT for intubation. MICU accepting patient for seizure like activity requiring intubation.    General Surgery consulted to R/O malignancy in given wall thickening of the colon on CT scan       PLAN:    - no acute surgical intervention indicated  - recommend IR consult for core needle biopsy    [id number]

## 2023-01-08 NOTE — PROGRESS NOTE ADULT - ASSESSMENT
52-year-old male with a PMHx significant for TIAs (2011, 2013), CVAs x3 (02/2022 s/p tPA, 8/3/2022 s/p tPA, 11/5/2022 with residual expressive aphasia) on Eliquis, and HLD BIBEMS after being found unresponsive at home on the floor, last known well 12/1 at around noon. C-collar was placed. Patient was intubated in the ED for airway protection. CTH with no acute findings, vEEG with no identified seizures. Patient was weaned off pressors, extubated, and transitioned to floors 12/4/22. 12/5 am RRT called for seizure like activity with urinary incontinence, tongue biting, and R>L posturing, patient s/p ativan 2mg x3. Anesthesia called to RRT for intubation. MICU accepting patient for seizure like activity requiring intubation.    # AMS, Seizure like activity  EEG 12/7: concern for epileptiform activity; though EEG from 12/5 without such abnormalities.   - f/u neuro recs  - Patient found down and unresponsive at home on 12/2, last known well 12/1 at around noon. Intubated in ED 12/2, extubated in MICU 12/4  - RRT 12/5: seizure like acting with urinary incontinence, convulsions, and tongue biting; s/p ativan 2mg x3 with pauses in seizure activity following each; intubated for airway protection with rocuronium. Prolactin ~75   - CTH and CT cervical spine 12/2 negative for any acute pathologies. Tox screen on admission negative. 12/5: Stable exam from prior CT head, chronic microvascular ischemic changes, parenchymal loss, dystrophic calcification of central portion of alexis unchanged.  - Video EEG 12/3-12/4 without any seizure like activity, moderate to severe nonspecific diffuse or multifocal cerebral dysfunction  - c/w keppra 1500mg q12 maintenance, Keppra loaded 4.5g  - patient extubated again on 12/8th  - MRI noted, chronic CVA, no acute CVA noted     MRI noted, new findings for leptomeningeal disease, R/O sarcoid vs lymphoma   Hematology and rheum eval called  repeat scan per heme    LP done, cyto: negative fo malignant cells   LE DVT study positive: eliquis switched to heparin full AC for now   S/P colonoscopy for malignancy W/U  surg eval for LN biopsy if possible     # CVAs/TIAs  - Hx of TIAs in 2011 and 2013, CVAs x3 in 02/22, 8/22, and 11/22 with residual expressive aphasia   - on Eliquis and Aspirin at home for hx of stroke  - PFO work up in past negative, no evidence of PFO on MIMI X2  - c/w ASA   - c/w heparin gtt, was on hold, resume eliquis   - Restart Statin when LFTs and CPK improve per neurology  - Being worked up for Mixed Connective Tissue Disease OP- f/u p-ANCA, c-ANCA/ [id number] negative  - Neuro recs appreciated  - repeat brain MRI noted   - LE pain, check MRI     Plan For repeat LP with cytology   GI for colon thickening seen on CT , plan for colonoscopy next week     # Hypernatremia  trend Na level   renal eval appreciated  S/P hydration, trend Na level     #Thecal sac flattening   - CT thoracic and lumbar spine showing degenerative disc disease T6-T7 through L4-L5 and mild disc bulges at L2-L3 through L4-L5 that flatten the ventral thecal sac and narrowing of the bilateral neural foramina  - Will Monitor for now, will consider neuro/neurosurg evaluation in the future    #HLD  - atorvastatin on hold due to elevated CK and LFTs  - restart when possible for secondary stroke prevention    #?ASD/PFO  - Patient reportedly found to have a PFO in February 2022 during a stroke workup at [place]. Patient presented for a PFO closure in November 2022. Notably, no PFO was found at the time and no intervention was performed by Dr. Lynn.    - TTE 11/5/22 EF 57% and grossly normal LV function  - MIMI performed bedside 12/5 1 of 2 studies (+) on bubble study (uploaded to [employer])  - elevated D d-andrew, LE DVT study positive       #Rhabdomyolysis  - CK 8720 on admission, peaked at 15k, now downtrending  - DDx: prolonged downtime myositis v hyperthermia? (T 105.4 F) v sepsis v seizure induced    - L Ankle and Knee pain, swelling  S/P Tap last week  cont to have pain and tenderness and swelling  Repeat imaging   Ortho follow up     # Elevated Winston level  Abd US noted    GI eval PRN   Trend LFTs, normalized, bili normalized.  no need for MRCP     #Partial SBO - resolved  - CT chest, abdomen, and pelvis w/ contrast concerning for possible partial SBO without transition point.  - Surgery consulted, no acute intervention at this time  - 12/5 KUB - negative for ileus or SBO  - Hold TF for 12/7 for possible trial of extubation.  - otherwise diet per nutrition    #Leukocytosis  - worsening leukocytosis  - monitor for fever  - Ortho eval fro knee swelling   - Pan Cx  - LP by IR, follow up results     #Concern for sepsis  Unclear source, aspiration v less likely meningitis   BCx (12/2 NGTD repeat 12/5 NGTD) and UC 12/2 NGTD  - Patient initially presented with AMS, T 105.4, tachycardic, and tachypneic   - UA negative  - s/p vanco and zosyn x1 in ED 12/2, ceftriaxone 2g BID 12/3-12/4, vanco 12/3-12/4  - 12/7 DCed Vanc since BCx from 12/5 NGTD  - c/w zosyn for aspiration pna presumed. completed course, monitor   - ID eval appreciated  - febrile again, Ortho for knee asp    # Anemia  noted, no gross bleeding  type and screen  GI eval called   R/O malignancy in view of thickening of the colon

## 2023-01-08 NOTE — PROGRESS NOTE ADULT - SUBJECTIVE AND OBJECTIVE BOX
Subjective: Patient seen and examined. No new events except as noted.     REVIEW OF SYSTEMS:    CONSTITUTIONAL: + weakness, fevers or chills  EYES/ENT: No visual changes;  No vertigo or throat pain   NECK: No pain or stiffness  RESPIRATORY: No cough, wheezing, hemoptysis; No shortness of breath  CARDIOVASCULAR: No chest pain or palpitations  GASTROINTESTINAL: No abdominal or epigastric pain. No nausea, vomiting, or hematemesis; No diarrhea or constipation. No melena or hematochezia.  GENITOURINARY: No dysuria, frequency or hematuria  NEUROLOGICAL: No numbness or weakness  SKIN: No itching, burning, rashes, or lesions   All other review of systems is negative unless indicated above.    MEDICATIONS:  MEDICATIONS  (STANDING):  aspirin enteric coated 81 milliGRAM(s) Oral daily  atorvastatin 40 milliGRAM(s) Oral at bedtime  bisacodyl Suppository 10 milliGRAM(s) Rectal daily  chlorhexidine 2% Cloths 1 Application(s) Topical daily  dextrose 50% Injectable 25 Gram(s) IV Push once  dextrose 50% Injectable 12.5 Gram(s) IV Push once  dextrose 50% Injectable 25 Gram(s) IV Push once  dextrose Oral Gel 15 Gram(s) Oral once  diphtheria/tetanus/pertussis (acellular) Vaccine (Adacel) 0.5 milliLiter(s) IntraMuscular once  glucagon  Injectable 1 milliGRAM(s) IntraMuscular once  heparin  Infusion. 1600 Unit(s)/Hr (16 mL/Hr) IV Continuous <Continuous>  lacosamide IVPB 100 milliGRAM(s) IV Intermittent every 12 hours  levETIRAcetam  IVPB 1500 milliGRAM(s) IV Intermittent every 12 hours  lidocaine   4% Patch 1 Patch Transdermal every 24 hours  multivitamin 1 Tablet(s) Oral daily      PHYSICAL EXAM:  T(C): 36.6 (01-08-23 @ 05:23), Max: 36.7 (01-07-23 @ 12:59)  HR: 61 (01-08-23 @ 05:23) (61 - 76)  BP: 112/74 (01-08-23 @ 05:23) (112/74 - 127/69)  RR: 18 (01-08-23 @ 05:23) (17 - 18)  SpO2: 100% (01-08-23 @ 05:23) (96% - 100%)  Wt(kg): --  I&O's Summary      General Appearance: NAD    Head: Normocephalic, atraumatic and no dysmorphic features  Ear, Nose, and Throat: Moist mucous membranes    CVS: S1S2+  Resp: No SOB, no wheeze or rhonchi  Abd: soft NTND  Extremities: No edema, no cyanosis  Skin: No bruises, no rashes  Neurological Exam:    Mental Status: Awake, Oriented x 2-3, able to follow simple and complex verbal commands. answers questions appropriately, able to name, repeat and recall. no aphasia, mild dysarthria (tongue bite)   Cranial Nerves: EOMI, PERRLA, VFF, V1-V3 sensation intact, no facial asymmetry, tongue midline, hearing intact B/L, shoulder shrug appropriate, SCM/trap intact.   Motor: Moving uppers > lowers. uppers at least 4+-5/5 ; lowers R>L 3-4/5   Sensation: intact to LT and PP   Reflexes: 1+ throughout at biceps, brachioradialis, triceps, patellars and ankles bilaterally and equal. No clonus. R toe and L toe were both downgoing.  Coordination: no dysmetria   Gait: unable         LABS:    CARDIAC MARKERS:                                10.6   14.35 )-----------( 157      ( 08 Jan 2023 06:48 )             31.7     01-08    138  |  104  |  20  ----------------------------<  149<H>  3.9   |  25  |  0.73    Ca    8.5      08 Jan 2023 06:48  Phos  4.1     01-08  Mg     2.00     01-08      proBNP:   Lipid Profile:   HgA1c:   TSH:             TELEMETRY: 	    ECG:  	  RADIOLOGY:   DIAGNOSTIC TESTING:  [ ] Echocardiogram:  [ ]  Catheterization:  [ ] Stress Test:    OTHER:

## 2023-01-08 NOTE — PROGRESS NOTE ADULT - SUBJECTIVE AND OBJECTIVE BOX
Patient is a 52y old  Male who presents with a chief complaint of Unresponsive (07 Jan 2023 18:22)       INTERVAL HPI/OVERNIGHT EVENTS:  Patient seen and evaluated at bedside.  Pt is resting comfortable in NAD. Denies N/V/F/C.  Pain rated at X/10    Allergies    No Known Allergies    Intolerances        Vital Signs Last 24 Hrs  T(C): 36.6 (08 Jan 2023 05:23), Max: 36.7 (07 Jan 2023 12:59)  T(F): 97.9 (08 Jan 2023 05:23), Max: 98.1 (07 Jan 2023 12:59)  HR: 61 (08 Jan 2023 05:23) (61 - 76)  BP: 112/74 (08 Jan 2023 05:23) (112/74 - 127/69)  BP(mean): --  RR: 18 (08 Jan 2023 05:23) (17 - 18)  SpO2: 100% (08 Jan 2023 05:23) (96% - 100%)    Parameters below as of 08 Jan 2023 05:23  Patient On (Oxygen Delivery Method): room air        LABS:                        10.6   14.35 )-----------( 157      ( 08 Jan 2023 06:48 )             31.7     01-08    138  |  104  |  20  ----------------------------<  149<H>  3.9   |  25  |  0.73    Ca    8.5      08 Jan 2023 06:48  Phos  4.1     01-08  Mg     2.00     01-08      PTT - ( 07 Jan 2023 23:22 )  PTT:61.8 sec    CAPILLARY BLOOD GLUCOSE          Lower Extremity Physical Exam:  Vascular: DP/PT 2/4, B/L, CFT <3 seconds B/L, Temperature gradient WNL, B/L.   Neuro: Epicritic sensation intact right foot, diminished 0/4 ipswich light touch left foot  Musculoskeletal/Ortho: mild plantarflexed angle, reducible, 0/5 dorsiflexion left side and 4/5 eversion/inversion/plantarflexion - right is WNL  Skin: eschar left lateral fifth metatarsal base, no open lesion grossly, no signs of infection no erythema nor edema bilateral    RADIOLOGY & ADDITIONAL TESTS:

## 2023-01-08 NOTE — PROGRESS NOTE ADULT - SUBJECTIVE AND OBJECTIVE BOX
TEAM Surgery Progress Note  Patient is a 52y old  Male who presents with a chief complaint of Unresponsive (08 Jan 2023 11:08)      OBJECTIVE:    Vital Signs Last 24 Hrs  T(C): 36.6 (08 Jan 2023 05:23), Max: 36.7 (07 Jan 2023 22:24)  T(F): 97.9 (08 Jan 2023 05:23), Max: 98 (07 Jan 2023 22:24)  HR: 61 (08 Jan 2023 05:23) (61 - 76)  BP: 112/74 (08 Jan 2023 05:23) (112/74 - 127/69)  BP(mean): --  RR: 18 (08 Jan 2023 05:23) (17 - 18)  SpO2: 100% (08 Jan 2023 05:23) (96% - 100%)    Parameters below as of 08 Jan 2023 05:23  Patient On (Oxygen Delivery Method): room air    I&O's Detail  MEDICATIONS  (STANDING):  aspirin enteric coated 81 milliGRAM(s) Oral daily  atorvastatin 40 milliGRAM(s) Oral at bedtime  bisacodyl Suppository 10 milliGRAM(s) Rectal daily  chlorhexidine 2% Cloths 1 Application(s) Topical daily  dextrose 50% Injectable 25 Gram(s) IV Push once  dextrose 50% Injectable 12.5 Gram(s) IV Push once  dextrose 50% Injectable 25 Gram(s) IV Push once  dextrose Oral Gel 15 Gram(s) Oral once  diphtheria/tetanus/pertussis (acellular) Vaccine (Adacel) 0.5 milliLiter(s) IntraMuscular once  glucagon  Injectable 1 milliGRAM(s) IntraMuscular once  heparin  Infusion. 1600 Unit(s)/Hr (16 mL/Hr) IV Continuous <Continuous>  lacosamide IVPB 100 milliGRAM(s) IV Intermittent every 12 hours  levETIRAcetam  IVPB 1500 milliGRAM(s) IV Intermittent every 12 hours  lidocaine   4% Patch 1 Patch Transdermal every 24 hours  multivitamin 1 Tablet(s) Oral daily    MEDICATIONS  (PRN):  heparin   Injectable 8000 Unit(s) IV Push every 6 hours PRN For aPTT less than 40  heparin   Injectable 4000 Unit(s) IV Push every 6 hours PRN For aPTT between 40 - 57  oxyCODONE    IR 5 milliGRAM(s) Oral every 6 hours PRN moderate and severe pain  phenazopyridine 100 milliGRAM(s) Oral every 8 hours PRN dysuria  traMADol 25 milliGRAM(s) Oral every 6 hours PRN Moderate Pain (4 - 6)    LABS:                        10.6   14.35 )-----------( 157      ( 08 Jan 2023 06:48 )             31.7     01-08    138  |  104  |  20  ----------------------------<  149<H>  3.9   |  25  |  0.73    Ca    8.5      08 Jan 2023 06:48  Phos  4.1     01-08  Mg     2.00     01-08      PTT - ( 08 Jan 2023 11:32 )  PTT:78.4 sec

## 2023-01-09 LAB
ANION GAP SERPL CALC-SCNC: 8 MMOL/L — SIGNIFICANT CHANGE UP (ref 7–14)
APTT BLD: 85.8 SEC — HIGH (ref 27–36.3)
BASOPHILS # BLD AUTO: 0.02 K/UL — SIGNIFICANT CHANGE UP (ref 0–0.2)
BASOPHILS NFR BLD AUTO: 0.2 % — SIGNIFICANT CHANGE UP (ref 0–2)
BUN SERPL-MCNC: 18 MG/DL — SIGNIFICANT CHANGE UP (ref 7–23)
CALCIUM SERPL-MCNC: 8.4 MG/DL — SIGNIFICANT CHANGE UP (ref 8.4–10.5)
CHLORIDE SERPL-SCNC: 105 MMOL/L — SIGNIFICANT CHANGE UP (ref 98–107)
CO2 SERPL-SCNC: 27 MMOL/L — SIGNIFICANT CHANGE UP (ref 22–31)
CREAT SERPL-MCNC: 0.82 MG/DL — SIGNIFICANT CHANGE UP (ref 0.5–1.3)
EGFR: 106 ML/MIN/1.73M2 — SIGNIFICANT CHANGE UP
EOSINOPHIL # BLD AUTO: 0 K/UL — SIGNIFICANT CHANGE UP (ref 0–0.5)
EOSINOPHIL NFR BLD AUTO: 0 % — SIGNIFICANT CHANGE UP (ref 0–6)
GLUCOSE SERPL-MCNC: 105 MG/DL — HIGH (ref 70–99)
HCT VFR BLD CALC: 31.3 % — LOW (ref 39–50)
HGB BLD-MCNC: 10.2 G/DL — LOW (ref 13–17)
IANC: 9.28 K/UL — HIGH (ref 1.8–7.4)
IMM GRANULOCYTES NFR BLD AUTO: 2.1 % — HIGH (ref 0–0.9)
LYMPHOCYTES # BLD AUTO: 16.3 % — SIGNIFICANT CHANGE UP (ref 13–44)
LYMPHOCYTES # BLD AUTO: 2.12 K/UL — SIGNIFICANT CHANGE UP (ref 1–3.3)
MAGNESIUM SERPL-MCNC: 2.1 MG/DL — SIGNIFICANT CHANGE UP (ref 1.6–2.6)
MCHC RBC-ENTMCNC: 29.6 PG — SIGNIFICANT CHANGE UP (ref 27–34)
MCHC RBC-ENTMCNC: 32.6 GM/DL — SIGNIFICANT CHANGE UP (ref 32–36)
MCV RBC AUTO: 90.7 FL — SIGNIFICANT CHANGE UP (ref 80–100)
MONOCYTES # BLD AUTO: 1.33 K/UL — HIGH (ref 0–0.9)
MONOCYTES NFR BLD AUTO: 10.2 % — SIGNIFICANT CHANGE UP (ref 2–14)
NEUTROPHILS # BLD AUTO: 9.28 K/UL — HIGH (ref 1.8–7.4)
NEUTROPHILS NFR BLD AUTO: 71.2 % — SIGNIFICANT CHANGE UP (ref 43–77)
NRBC # BLD: 0 /100 WBCS — SIGNIFICANT CHANGE UP (ref 0–0)
NRBC # FLD: 0 K/UL — SIGNIFICANT CHANGE UP (ref 0–0)
PHOSPHATE SERPL-MCNC: 2.9 MG/DL — SIGNIFICANT CHANGE UP (ref 2.5–4.5)
PLATELET # BLD AUTO: 134 K/UL — LOW (ref 150–400)
POTASSIUM SERPL-MCNC: 3.4 MMOL/L — LOW (ref 3.5–5.3)
POTASSIUM SERPL-SCNC: 3.4 MMOL/L — LOW (ref 3.5–5.3)
RBC # BLD: 3.45 M/UL — LOW (ref 4.2–5.8)
RBC # FLD: 14.2 % — SIGNIFICANT CHANGE UP (ref 10.3–14.5)
SODIUM SERPL-SCNC: 140 MMOL/L — SIGNIFICANT CHANGE UP (ref 135–145)
SURGICAL PATHOLOGY STUDY: SIGNIFICANT CHANGE UP
WBC # BLD: 13.02 K/UL — HIGH (ref 3.8–10.5)
WBC # FLD AUTO: 13.02 K/UL — HIGH (ref 3.8–10.5)

## 2023-01-09 RX ORDER — POTASSIUM CHLORIDE 20 MEQ
40 PACKET (EA) ORAL EVERY 4 HOURS
Refills: 0 | Status: COMPLETED | OUTPATIENT
Start: 2023-01-09 | End: 2023-01-09

## 2023-01-09 RX ADMIN — LEVETIRACETAM 400 MILLIGRAM(S): 250 TABLET, FILM COATED ORAL at 17:40

## 2023-01-09 RX ADMIN — LACOSAMIDE 120 MILLIGRAM(S): 50 TABLET ORAL at 05:09

## 2023-01-09 RX ADMIN — LIDOCAINE 1 PATCH: 4 CREAM TOPICAL at 17:41

## 2023-01-09 RX ADMIN — LACOSAMIDE 120 MILLIGRAM(S): 50 TABLET ORAL at 17:40

## 2023-01-09 RX ADMIN — CHLORHEXIDINE GLUCONATE 1 APPLICATION(S): 213 SOLUTION TOPICAL at 17:43

## 2023-01-09 RX ADMIN — HEPARIN SODIUM 1500 UNIT(S)/HR: 5000 INJECTION INTRAVENOUS; SUBCUTANEOUS at 19:14

## 2023-01-09 RX ADMIN — Medication 40 MILLIEQUIVALENT(S): at 09:02

## 2023-01-09 RX ADMIN — HEPARIN SODIUM 1500 UNIT(S)/HR: 5000 INJECTION INTRAVENOUS; SUBCUTANEOUS at 07:15

## 2023-01-09 RX ADMIN — LEVETIRACETAM 400 MILLIGRAM(S): 250 TABLET, FILM COATED ORAL at 05:08

## 2023-01-09 RX ADMIN — Medication 40 MILLIEQUIVALENT(S): at 13:26

## 2023-01-09 RX ADMIN — Medication 81 MILLIGRAM(S): at 17:43

## 2023-01-09 RX ADMIN — OXYCODONE HYDROCHLORIDE 5 MILLIGRAM(S): 5 TABLET ORAL at 09:15

## 2023-01-09 RX ADMIN — TRAMADOL HYDROCHLORIDE 25 MILLIGRAM(S): 50 TABLET ORAL at 13:26

## 2023-01-09 RX ADMIN — ATORVASTATIN CALCIUM 40 MILLIGRAM(S): 80 TABLET, FILM COATED ORAL at 21:09

## 2023-01-09 RX ADMIN — OXYCODONE HYDROCHLORIDE 5 MILLIGRAM(S): 5 TABLET ORAL at 09:01

## 2023-01-09 RX ADMIN — Medication 1 TABLET(S): at 17:42

## 2023-01-09 NOTE — PROGRESS NOTE ADULT - ASSESSMENT
52-year-old  M known to me from outpatient setting with  TIAs (2011, 2013), Strokes x3 (02/2022 s/p tPA, 8/3/2022 s/p tPA, 11/5/2022 with residual expressive aphasia) on Eliquis, was on testosterone outpatient stopped after last stroke, HLD BIBEMS after being found unresponsive  Temp 105.4 on arrival tachy and /110. 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   CT cervical spine and maxillofacial scans negative.  CT chest, abdomen, and pelvis w/ contrast concerning for possible partial SBO without transition point. Surgery consulted in ED, no acute surgical intervention at this time.   CT thoracic and lumbar spine showing degenerative disc disease T6-T7 through L4-L5 and mild disc bulges at L2-L3 through L4-L5 that flatten the ventral thecal sac and narrowing of the bilateral neural foramina.  MRI 11/2022: R centrum semiovale and R posterior frontal infarcts   takes adderall at home   + rhabdo  EEG no seizures   + transaminitis   CTH 12/5 stable   outpatient hypercoag workup neg   12/5 extubated then reintibuated for seizure activity and tx back to ICU   EEG this AM 12/5 with only slowing   EEG 12/6 slowing but no seizures   spoke with Pippa Conti (friend) who states after he was taken off the testosterone he ordered a mail order testosterone supplement, unclear if he started it yet, unclear what this was  12/7 EEG no electrogtraphic seizures captured but R LPDs, rare L frontal discharges, severe GRDA.   12/8 EEG improved.  extubated. following some commands   12/9 moving uppers> lowers   12/11 AAOx2 uppers 4-5/5; not moving LLE well   MRI brain neg for new stroke   s/p L knee tap arthrocentesis  by ortho on 12/12   spoke with friend pippa conti - she counted his adderrall and didn't take extra tables. did find ashwagandha and red wood (bottle was sealed) supplement in his apartment   s/p LP 12/5 --> CSF glucose and protein WNL.  will f/u remaining studies. non infectious appearing --> paraneoplastic was neg   o/e AAOx2, slurred but 2/2 tongue bite   + transaminitis   more alert  12/20 neuro exam improving AAOx2-3   c/o L ankle pain in addition to knee  + COVID   MRI brain 12/29: chronic R frontal and R centrum semiovale infarcts unchanged.  more conspicuous and newer bilateral lentiform nucleus and pontine enhancement of unclear etiology. ddx CLIPPERS, sarcoid, lymphoma vs infection.  (reviewed [facility] neuro radiology Dr. fried)   LE doppler L DVT   MRI ankle. : acute on chronic edema left intrinsic hindfoot musculature.  chronic plantar fascitis.   MRI L spine with degenerative changes   family hx of susac in cousin  Is/p LP 1/4 f/u flow and cytology   completed another round of high dose steroids     Impression:   1) AMS of unclear etiology, possible now seizure, related to adderral withdrawal? 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 had MIMI was question of PFO but not found. s/p ILR   3) bilateral lentiform nucleus and pontine enhacement     - s/p LP 1/4 f/u cytology, if neg would consider LN bx easier to bx than brain , spoke with rheum   - f/u MRI L spine ordered and MR Lower extremity --> doesn't really explain foot drop.  will try to get EMG but not always available at [facility] and may not .   - agree with bx if able for tissue pathology   - CT C A P --> neg.  --> LE doppler L DVT   - unclear of what to make of new MRI findings from 12/29, doubt related to covid as was present minimally on prior MRI.   lower suspicion for CLIPPERS.  would workup for lymphoma and sarcoid at this point. consider LN bx   - covid precuations    -  L ankle imaging --. podiatry.   - resume AC s/p LP when able given L knee --> eliquis 5mg BID restarted but now changed to heparin drip   - possible MRCP planning for Monday. GI f/u.  elevated LFTs --> LFTs improved--> downtrending . MRCP deferred   - ortho for L knee s/p tap / arthrocentesis   -  Vimpat 100mg BID    - keppra 1500mg BID.   - fever on arrival, treatred initially for meningitis.  was on abx for aspiration PNA.  >LP done--> non  infectious   - IVF  - CPK elevated. trend improving   -   endocrine workup/eval   - there was some concern outpatient he may have a mixed  connective tissue disorder   - statin therapy for secondary stroke prevention when LFTS and CPK improved   - telemetry  - PT/OT/SS/SLP, OOBC  - check FS, glucose control <180  - GI/DVT ppx  - Thank you for allowing me to participate in the care of this patient. Call with questions.   - spoke with friend at bedside, [place] 12/11   - spoke with Pippa Conti at [phone] for more collateral info and to provide update. spoke again 12/12 over phone. spoke 12/19, 1/3    Braxton Forde MD  Vascular Neurology  Office: [phone]

## 2023-01-09 NOTE — PROGRESS NOTE ADULT - ATTENDING SUPERVISION STATEMENT
Fellow
Resident
Fellow
Resident

## 2023-01-09 NOTE — PROGRESS NOTE ADULT - SUBJECTIVE AND OBJECTIVE BOX
Name of Patient : TAMI DUNHAM  MRN: [id number]  Date of visit: 01-09-23 @ 19:53      Subjective: Patient seen and examined. No new events except as noted.   calm, doing okay     REVIEW OF SYSTEMS:    CONSTITUTIONAL: No weakness, fevers or chills  EYES/ENT: No visual changes;  No vertigo or throat pain   NECK: No pain or stiffness  RESPIRATORY: No cough, wheezing, hemoptysis; No shortness of breath  CARDIOVASCULAR: No chest pain or palpitations  GASTROINTESTINAL: No abdominal or epigastric pain. No nausea, vomiting, or hematemesis; No diarrhea or constipation. No melena or hematochezia.  GENITOURINARY: No dysuria, frequency or hematuria  NEUROLOGICAL: No numbness or weakness  SKIN: No itching, burning, rashes, or lesions   All other review of systems is negative unless indicated above.    MEDICATIONS:  MEDICATIONS  (STANDING):  aspirin enteric coated 81 milliGRAM(s) Oral daily  atorvastatin 40 milliGRAM(s) Oral at bedtime  bisacodyl Suppository 10 milliGRAM(s) Rectal daily  chlorhexidine 2% Cloths 1 Application(s) Topical daily  dextrose 50% Injectable 25 Gram(s) IV Push once  dextrose 50% Injectable 12.5 Gram(s) IV Push once  dextrose 50% Injectable 25 Gram(s) IV Push once  dextrose Oral Gel 15 Gram(s) Oral once  diphtheria/tetanus/pertussis (acellular) Vaccine (Adacel) 0.5 milliLiter(s) IntraMuscular once  glucagon  Injectable 1 milliGRAM(s) IntraMuscular once  heparin  Infusion. 1600 Unit(s)/Hr (16 mL/Hr) IV Continuous <Continuous>  lacosamide IVPB 100 milliGRAM(s) IV Intermittent every 12 hours  levETIRAcetam  IVPB 1500 milliGRAM(s) IV Intermittent every 12 hours  lidocaine   4% Patch 1 Patch Transdermal every 24 hours  multivitamin 1 Tablet(s) Oral daily      PHYSICAL EXAM:  T(C): 36.4 (01-09-23 @ 18:16), Max: 36.9 (01-09-23 @ 02:17)  HR: 63 (01-09-23 @ 18:16) (51 - 92)  BP: 127/83 (01-09-23 @ 18:16) (115/71 - 141/86)  RR: 18 (01-09-23 @ 12:10) (17 - 18)  SpO2: 96% (01-09-23 @ 18:16) (96% - 98%)  Wt(kg): --  I&O's Summary        Appearance: Normal	  HEENT:  PERRLA   Lymphatic: No lymphadenopathy   Cardiovascular: Normal S1 S2, no JVD  Respiratory: normal effort , clear  Gastrointestinal:  Soft, Non-tender  Skin: No rashes,  warm to touch  Psychiatry:  Mood & affect appropriate  Musculuskeletal: No edema      All labs, Imaging and EKGs personally reviewed                           10.2   13.02 )-----------( 134      ( 09 Jan 2023 05:22 )             31.3               01-09    140  |  105  |  18  ----------------------------<  105<H>  3.4<L>   |  27  |  0.82    Ca    8.4      09 Jan 2023 05:22  Phos  2.9     01-09  Mg     2.10     01-09      PTT - ( 09 Jan 2023 05:22 )  PTT:85.8 sec

## 2023-01-09 NOTE — PROGRESS NOTE ADULT - SUBJECTIVE AND OBJECTIVE BOX
TEAM Surgery Progress Note  Patient is a 52y old  Male who presents with a chief complaint of Unresponsive (08 Jan 2023 15:23)       OBJECTIVE:    Vital Signs Last 24 Hrs  T(C): 36.3 (09 Jan 2023 05:26), Max: 36.9 (09 Jan 2023 02:17)  T(F): 97.4 (09 Jan 2023 05:26), Max: 98.4 (09 Jan 2023 02:17)  HR: 51 (09 Jan 2023 05:26) (51 - 92)  BP: 141/86 (09 Jan 2023 05:26) (116/75 - 141/86)  BP(mean): --  RR: 18 (09 Jan 2023 05:26) (17 - 18)  SpO2: 98% (09 Jan 2023 05:26) (97% - 98%)    Parameters below as of 09 Jan 2023 05:26  Patient On (Oxygen Delivery Method): room air    I&O's Detail  MEDICATIONS  (STANDING):  aspirin enteric coated 81 milliGRAM(s) Oral daily  atorvastatin 40 milliGRAM(s) Oral at bedtime  bisacodyl Suppository 10 milliGRAM(s) Rectal daily  chlorhexidine 2% Cloths 1 Application(s) Topical daily  dextrose 50% Injectable 25 Gram(s) IV Push once  dextrose 50% Injectable 12.5 Gram(s) IV Push once  dextrose 50% Injectable 25 Gram(s) IV Push once  dextrose Oral Gel 15 Gram(s) Oral once  diphtheria/tetanus/pertussis (acellular) Vaccine (Adacel) 0.5 milliLiter(s) IntraMuscular once  glucagon  Injectable 1 milliGRAM(s) IntraMuscular once  heparin  Infusion. 1600 Unit(s)/Hr (16 mL/Hr) IV Continuous <Continuous>  lacosamide IVPB 100 milliGRAM(s) IV Intermittent every 12 hours  levETIRAcetam  IVPB 1500 milliGRAM(s) IV Intermittent every 12 hours  lidocaine   4% Patch 1 Patch Transdermal every 24 hours  multivitamin 1 Tablet(s) Oral daily  potassium chloride   Powder 40 milliEquivalent(s) Oral every 4 hours    MEDICATIONS  (PRN):  heparin   Injectable 8000 Unit(s) IV Push every 6 hours PRN For aPTT less than 40  heparin   Injectable 4000 Unit(s) IV Push every 6 hours PRN For aPTT between 40 - 57  oxyCODONE    IR 5 milliGRAM(s) Oral every 6 hours PRN moderate and severe pain  traMADol 25 milliGRAM(s) Oral every 6 hours PRN Moderate Pain (4 - 6)      LABS:                        10.2   13.02 )-----------( 134      ( 09 Jan 2023 05:22 )             31.3     01-09    140  |  105  |  18  ----------------------------<  105<H>  3.4<L>   |  27  |  0.82    Ca    8.4      09 Jan 2023 05:22  Phos  2.9     01-09  Mg     2.10     01-09      PTT - ( 09 Jan 2023 05:22 )  PTT:85.8 sec

## 2023-01-09 NOTE — PROVIDER CONTACT NOTE (OTHER) - SITUATION
Patient is sinus bradycardic with HR in 40's-50's. Asymptomatic at this time. Vital signs stable.
aPtt clotted  as per hematology
Patient temperature 101.1F

## 2023-01-09 NOTE — PROVIDER CONTACT NOTE (OTHER) - ACTION/TREATMENT ORDERED:
Provider notified. EKG completed as ordered. Will continue to monitor for changes.
Scooter Lang notified. IV tylenol to be ordered.
provider notified. will redraw as soon as reordered

## 2023-01-09 NOTE — PROGRESS NOTE ADULT - SUBJECTIVE AND OBJECTIVE BOX
Neurology Progress Note    S: Patient seen and examined ,  no significant neuro change     Medication:  MEDICATIONS  (STANDING):  aspirin enteric coated 81 milliGRAM(s) Oral daily  atorvastatin 40 milliGRAM(s) Oral at bedtime  bisacodyl Suppository 10 milliGRAM(s) Rectal daily  chlorhexidine 2% Cloths 1 Application(s) Topical daily  dextrose 50% Injectable 25 Gram(s) IV Push once  dextrose 50% Injectable 12.5 Gram(s) IV Push once  dextrose 50% Injectable 25 Gram(s) IV Push once  dextrose Oral Gel 15 Gram(s) Oral once  diphtheria/tetanus/pertussis (acellular) Vaccine (Adacel) 0.5 milliLiter(s) IntraMuscular once  glucagon  Injectable 1 milliGRAM(s) IntraMuscular once  heparin  Infusion. 1600 Unit(s)/Hr (16 mL/Hr) IV Continuous <Continuous>  lacosamide IVPB 100 milliGRAM(s) IV Intermittent every 12 hours  levETIRAcetam  IVPB 1500 milliGRAM(s) IV Intermittent every 12 hours  lidocaine   4% Patch 1 Patch Transdermal every 24 hours  multivitamin 1 Tablet(s) Oral daily    MEDICATIONS  (PRN):  heparin   Injectable 8000 Unit(s) IV Push every 6 hours PRN For aPTT less than 40  heparin   Injectable 4000 Unit(s) IV Push every 6 hours PRN For aPTT between 40 - 57  oxyCODONE    IR 5 milliGRAM(s) Oral every 6 hours PRN moderate and severe pain  traMADol 25 milliGRAM(s) Oral every 6 hours PRN Moderate Pain (4 - 6)      Vitals:    Vital Signs Last 24 Hrs  T(C): 36.4 (01-09-23 @ 18:16), Max: 36.9 (01-09-23 @ 02:17)  T(F): 97.6 (01-09-23 @ 18:16), Max: 98.4 (01-09-23 @ 02:17)  HR: 63 (01-09-23 @ 18:16) (51 - 92)  BP: 127/83 (01-09-23 @ 18:16) (115/71 - 141/86)  BP(mean): --  RR: 18 (01-09-23 @ 12:10) (17 - 18)  SpO2: 96% (01-09-23 @ 18:16) (96% - 98%)              General Exam:   General Appearance: Appropriately dressed and in no acute distress       Head: Normocephalic, atraumatic and no dysmorphic features  Ear, Nose, and Throat: Moist mucous membranes    CVS: S1S2+  Resp: No SOB, no wheeze or rhonchi  Abd: soft NTND  Extremities: No edema, no cyanosis  Skin: No bruises, no rashes     Neurological Exam:    Mental Status: Awake, Oriented x 2-3, able to follow simple and complex verbal commands. answers questions appropriately, able to name, repeat and recall. no aphasia, mild dysarthria (tongue bite)   Cranial Nerves: EOMI, PERRLA, VFF, V1-V3 sensation intact, no facial asymmetry, tongue midline, hearing intact B/L, shoulder shrug appropriate, SCM/trap intact.   Motor: Moving uppers > lowers. uppers at least 4+-5/5 ; lowers R>L 3-4/5   Sensation: intact to LT and PP   Reflexes: 1+ throughout at biceps, brachioradialis, triceps, patellars and ankles bilaterally and equal. No clonus. R toe and L toe were both downgoing.  Coordination: no dysmetria   Gait: unable     I personally reviewed the below data/images/labs:  CBC Full  -  ( 09 Jan 2023 05:22 )  WBC Count : 13.02 K/uL  RBC Count : 3.45 M/uL  Hemoglobin : 10.2 g/dL  Hematocrit : 31.3 %  Platelet Count - Automated : 134 K/uL  Mean Cell Volume : 90.7 fL  Mean Cell Hemoglobin : 29.6 pg  Mean Cell Hemoglobin Concentration : 32.6 gm/dL  Auto Neutrophil # : 9.28 K/uL  Auto Lymphocyte # : 2.12 K/uL  Auto Monocyte # : 1.33 K/uL  Auto Eosinophil # : 0.00 K/uL  Auto Basophil # : 0.02 K/uL  Auto Neutrophil % : 71.2 %  Auto Lymphocyte % : 16.3 %  Auto Monocyte % : 10.2 %  Auto Eosinophil % : 0.0 %  Auto Basophil % : 0.2 %    01-09    140  |  105  |  18  ----------------------------<  105<H>  3.4<L>   |  27  |  0.82    Ca    8.4      09 Jan 2023 05:22  Phos  2.9     01-09  Mg     2.10     01-09      < from: CT Head No Cont (12.02.22 @ 20:27) >    ACC: [id number] EXAM:  CT CERVICAL SPINE                        ACC: [id number] EXAM:  CT MAXILLOFACIAL                        ACC: [id number] EXAM:  CT BRAIN                          PROCEDURE DATE:  12/02/2022        INTERPRETATION:  CLINICAL INDICATION: Trauma.    Technique: Noncontrast axial CT of the head, facial bones, and cervical   spine was performed. 3-D reformats of the facial bones was obtained.   Coronal and sagittal reformats were obtained.      COMPARISON: None.    FINDINGS:  Head CT:  The ventricles and sulci are within normal limits. 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 There is no intraparenchymal hematoma, mass effect   or midline shift. No abnormal extra-axial fluid collections or   hemorrhages are present.    There is swelling of the left lateral scalp. The calvarium is intact.   There are scattered mucosal inflammatory changes in the paranasal sinuses.      Cervical spine CT:  Alignment ismaintained. Vertebral bodies are normal in height, without   evidence of fracture or dislocation. Prevertebral soft tissues are within   normal limits without soft tissue swelling or hematoma.    Intervertebral discs are intact. Neural foramina and spinal canal are   intact.    The visualized lung apices are within normal limits.      Facial bone CT:    No acute facial fracture.    There are scattered mucosal inflammatory changes in the paranasal   sinuses. Mastoid air cells are clear.    Soft tissues appear unremarkable.        IMPRESSION:  CT HEAD: No acute abnormality.  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  CT CERVICAL SPINE: No acute abnormality  CT FACIAL BONE: No acute abnormality    --- End of Report ---       DELGADO IVAN MD; Attending Radiologist  This document has been electronically signed. Dec  2 2022  9:02PM    < end of copied text >  < from: CT Lumbar Spine No Cont (12.02.22 @ 20:27) >    ACC: [id number] EXAM:  CT LUMBAR SPINE                        ACC: [id number] EXAM:  CT THORACIC SPINE                          PROCEDURE DATE:  12/02/2022          INTERPRETATION:  CT thoracic and lumbar spine without IV contrast    CLINICAL INFORMATION:  Trauma  Back pain, fracture.    TECHNIQUE:  Contiguous axial 2 mm sections were obtained through the   thoracic and lumbar spine using a single helical acquisition.     Additional 2 mm sagittal and coronal reconstructions of the spine were   obtained. These additional reformatted images were used to evaluate the   spine for alignment, vertebral fractures and the integrity of the the   posterior elements.   This scan was performed using automatic exposure   control (radiation dose reduction software) to obtain a diagnostic image   quality scan with patient dose as low as reasonably achievable.    FINDINGS:   No prior similar studies are available for review    Thoracic and lumbar vertebral body heights are maintained. No vertebral   fracture is seen. No destructive bone lesion is found.  Alignment is   preserved.  Facet joints appear intact and aligned.    Thoracic and lumbar intervertebral disc spaces appear intact.   Degenerative disc disease and spondylosis is noted at T6-T7 throughL4-5   with loss of disc height and associated degenerative endplate changes.   Mild disc bulges at L2-3 through L4-5 flatten the ventral thecal sac and   narrow the BILATERAL neural foramina.    No paraspinal mass is recognized.  Paraspinal soft tissues appear intact.      IMPRESSION:  Degenerative disc disease and spondylosis is noted at T6-T7   through L4-5 with loss of disc height and associated degenerative   endplate changes. Mild disc bulges at L2-3 through L4-5 flatten the   ventral thecal sac and narrow the BILATERAL neural foramina   No   vertebral fracture is recognized.    --- End of Report ---       ANGIE ESCOBAR MD; Attending Radiologist  This document has been electronically signed. Dec  2 2022  8:55PM    < end of copied text >    EEG Classification / Summary:  Abnormal EEG in a comatose patient due to diffuse suppression gradually improving to discontinuous background, with right hemispheric relative attenuation/suppression. No epileptiform abnormalities or seizures are captured.    Clinical Impression:  Severe diffuse cerebral dysfunction that gradually improves is nonspecific in etiology. In this case, it may be related to sedating medication (propofol) with improvement after decreasing and stopping the medication.  Right hemispheric focal cerebral dysfunction can be structural or functional in etiology.      12/7  Clinical Impression:  -Occasional runs of right frontal lateralized periodic discharges (LPDs) at up to 1.3 Hz indicate a potentially epileptogenic focus in the right frontal region and are on the ictal-interictal continuum.  -A pattern on the ictal-interictal continuum is a pattern that does not qualify as an electrographic seizure or electrographic status epilepticus, but there is a reasonable chance that it may be contributing to impaired alertness, causing other clinical symptoms, and/or contributing to neuronal injury.  -Right hemispheric relative attenuation indicates right hemispheric focal cerebral dysfunction, which can be structural or functional in etiology.  -Rare left frontal epileptiform discharges indicate a potentially epileptogenic focus in this reigon  -Severe diffuse slowing and GRDA indicate severe diffuse cerebral dysfunction of nonspecific etiology.  -No electrographic seizures are captured.     < from: MR Head w/wo IV Cont (12.09.22 @ 19:54) >    ACC: [id number] EXAM:  MR BRAIN WAW IC                          PROCEDURE DATE:  12/09/2022          INTERPRETATION:  CLINICAL INDICATION: CVA, found unresponsive at home      Magnetic resonance imaging of the brain was carried out with transaxial   SPGR, FLAIR, fast spin echo T2 weighted images, axial susceptibility   weighted series, diffusion weighted series and sagittal T1 weighted   series on a 1.5 Maritza magnet. Post contrast axial, coronal and sagittal   T1 weighted images were obtained. 10cc of Gadavist were intravenously   injected, 0 cc were discarded.      Comparison is made with the prior brain CT of 12/5/2022 and MRI 11/5/2022.    Mild ventricular and sulcal prominence is consistent with moderate   atrophy for the patient's age. No acute hemorrhage is identified. There   is a focus of hemosiderin within the alexis which is calcified on CT.   Scattered additional foci of hemosiderin deposition are identified in the   left frontal subcortical white matter and right occipital cortex is   nonspecific but may be related to multiple cavernoma is or hypertension.    There is a tiny focus of diffusion restriction in the right frontal   subcortical white matter and right centrum semiovale which are unchanged   since the prior exam and may represent subacute infarcts. Multiplicity   infarcts may be related to hypercoagulable state or cardioembolic events.    After contrast administration there is normal intracranial vascular   enhancement. No abnormal parenchymal or leptomeningeal enhancement.      IMPRESSION: Atrophy for the patient's age. Right frontal subcortical and   right centrum semiovale punctate infarcts are unchanged since 11/5/2022   and likely represent subacute infarcts. No abnormal enhancement. Focus of   calcification in the alexis with old hemosiderin. A few additional   scattered foci of hemosiderin deposition are unchanged.    --- End of Report ---     < from: MR Head w/wo IV Cont (12.29.22 @ 13:39) >    ACC: [id number] EXAM:  MR BRAIN WAW IC                          PROCEDURE DATE:  12/29/2022          INTERPRETATION:  CLINICAL INFORMATION: Follow-up study for infarct seen   on prior MR 12/9/2022. Patient initially presented to the hospital after   being found unresponsive.    TECHNIQUE: MRI of the brain was performed with and without contrast.   Sagittal and axial T1, axial T2, FLAIR, SWI, diffusion-weighted images   and an ADC map were obtained.    9.5 cc of Gadavist was injected, with 0.5 cc discarded.    COMPARISON: MR brain 12/9/2022    FINDINGS:    Previously seen foci of diffusion restriction in the right frontal   subcortical white matter and right centrum semiovale no longer restricted   diffusion and represent chronic infarcts. No new areas of infarction are   identified.    Foci of susceptibility artifact in the right occipital and right parietal   lobe, unchanged. No acute intracranial hemorrhage. No mass effect or   midline shift.    Areas of contrast enhancement in the bilateral putamen (12-18).   Enhancement of the alexis is also noted.    Moderate prominence of the sulci and ventricles..    There are foci of T2/FLAIR signal hyperintensity within the hemispheric   white matter, which are nonspecific but likely related tosequelae of   microvascular disease.    No abnormal extra-axial fluid collections are present.    Major flow-voids at the base of the brain follow expected course and   contour.    The calvarium is intact. Right ethmoid sinus mucosal thickening.    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 No new areas of infarct are identified. Unchanged scattered   foci of hemosiderin.    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   Differential diagnosis includes infection, sarcoidosis, lymphoma and   CLIPPERS (chronic lymphocytic inflammation with pontine perivascular   enhancement responsive to steroids).    --- End of Report ---         CARMEN KONG MD; Resident Radiologist  This document has been electronically signed.  TANVIR LUNA MD; Attending Radiologist  This document has been electronically signed. Dec 29 2022  5:08PM    < end of copied text >  < from: MR Lower Ext Joint No Cont, Left (01.03.23 @ 10:14) >  N:    1.  Normal appearance of left deep peroneal and tibial nerves at the   level of the ankle.  2.  Acute on chronic denervation edema of left intrinsic hindfoot   musculature.  3.  Acute on chronic left plantar fasciitis with nonacute grade 2   sprain/1.3 cm low to moderate grade partial-thickness tear at the   calcaneal origin of the central band.  4.  Additional lower grade and chronic findings as detailed in the body   section of this report.    --- End of Report ---       < from: MR Lumbar Spine w/wo IV Cont (01.03.23 @ 12:29) >    ACC: [id number] EXAM:  MR SPINE LUMBAR WAW IC                          PROCEDURE DATE:  01/03/2023          INTERPRETATION:  CLINICAL INFORMATION: Foot drop    MRI of the lumbar spine was performed using sagittal T1, T2 and STIR   sequence.    Mild loss of the normal lumbar lordosis is seen.    The vertebral body height and alignment appear normal    Disc desiccation is seen involving the lower thoracic and lumbar spine   region. These findings are secondary to chronic degenerative changes.    T12-L1: Normal    L1-2: Normal    L2-3: Normal    L3-4: Mild disc bulge is seen. No significant compromise of the spinal   canal or either neural foramen    L4-5: Disc bulge is identified. There is abnormal T2 prolongation   associated with the central portion of this disc bulge, this is likely   compatible with an annular fissure. Minimal effacement of ventral thecal   sac is seen. Bilateral hypertrophic facet joint changes. No significant   compromise of the spinal canal.    L5-S1: Normal    The conus ends at L2 and appears normal    Evaluation of the paraspinal soft tissues appear normal    IMPRESSION: Degenerative changes as described above.    --- End of Report ---            AT DANIEL MD; Attending Radiologist  This document has been electronically signed. Gerardo  3 2023  1:13PM    < end of copied text >

## 2023-01-09 NOTE — PROVIDER CONTACT NOTE (OTHER) - ASSESSMENT
Patient temperature 101.1F.
Patient is sinus bradycardic with HR in 40's-50's. Asymptomatic at this time. Vital signs stable.
aPtt clotted  as per hematology

## 2023-01-09 NOTE — PROGRESS NOTE ADULT - SUBJECTIVE AND OBJECTIVE BOX
CHIEF COMPLAINT  AMS    HISTORY OF PRESENT ILLNESS  TAMI DUNHAM is a 52y Male who presents with a chief complaint of AMS    No acute events. No complaints.    REVIEW OF SYSTEMS  A complete review of systems was performed; negative except per HPI    PHYSICAL EXAM  T(C): 36.3 (01-09-23 @ 12:10), Max: 36.9 (01-09-23 @ 02:17)  HR: 70 (01-09-23 @ 12:10) (51 - 92)  BP: 115/71 (01-09-23 @ 12:10) (115/71 - 141/86)  RR: 18 (01-09-23 @ 12:10) (17 - 18)  SpO2: 97% (01-09-23 @ 12:10) (97% - 98%)  Constitutional: alert, awake, in no acute distress  Eyes: PERRL, EOMI  HEENT: normocephalic, atraumatic  Neck: supple, non-tender  Cardiovascular: normal perfusion, no peripheral edema  Respiratory: normal respiratory efforts; no increased use of accessory muscles  Gastrointestinal: soft, non-tender  Musculoskeletal: normal range of motion, no deformities noted  Neurological: alert, CN II to XI grossly intact  Skin: warm, dry    LABORATORY DATA                        10.2   13.02 )-----------( 134      ( 09 Jan 2023 05:22 )             31.3     01-09    140  |  105  |  18  ----------------------------<  105<H>  3.4<L>   |  27  |  0.82    Ca    8.4      09 Jan 2023 05:22  Phos  2.9     01-09  Mg     2.10     01-09

## 2023-01-09 NOTE — PROGRESS NOTE ADULT - PROBLEM SELECTOR PLAN 4
Hx of TIAs in 2011 and 2013, CVAs x3 in 02/22, 8/22, and 11/22 with residual expressive aphasia    - on ASA/Eliquis (eliquis on hold)  on hep gtt  s/p colonoscopy 1/6 - benign polyp

## 2023-01-09 NOTE — PROGRESS NOTE ADULT - ASSESSMENT
52-year-old male with a PMHx significant for TIAs (2011, 2013), CVAs x3 (02/2022 s/p tPA, 8/3/2022 s/p tPA, 11/5/2022 with residual expressive aphasia) on Eliquis, and HLD BIBEMS after being found unresponsive at home on the floor, last known well 12/1 at around noon. C-collar was placed. Patient was intubated in the ED for airway protection. CTH with no acute findings, vEEG with no identified seizures. Patient was weaned off pressors, extubated, and transitioned to floors 12/4/22. 12/5 am RRT called for seizure like activity with urinary incontinence, tongue biting, and R>L posturing, patient s/p ativan 2mg x3. Anesthesia called to RRT for intubation. MICU accepting patient for seizure like activity requiring intubation.    # AMS, Seizure like activity  EEG 12/7: concern for epileptiform activity; though EEG from 12/5 without such abnormalities.   - f/u neuro recs  - Patient found down and unresponsive at home on 12/2, last known well 12/1 at around noon. Intubated in ED 12/2, extubated in MICU 12/4  - RRT 12/5: seizure like acting with urinary incontinence, convulsions, and tongue biting; s/p ativan 2mg x3 with pauses in seizure activity following each; intubated for airway protection with rocuronium. Prolactin ~75   - CTH and CT cervical spine 12/2 negative for any acute pathologies. Tox screen on admission negative. 12/5: Stable exam from prior CT head, chronic microvascular ischemic changes, parenchymal loss, dystrophic calcification of central portion of alexis unchanged.  - Video EEG 12/3-12/4 without any seizure like activity, moderate to severe nonspecific diffuse or multifocal cerebral dysfunction  - c/w keppra 1500mg q12 maintenance, Keppra loaded 4.5g  - patient extubated again on 12/8th  - MRI noted, chronic CVA, no acute CVA noted     MRI noted, new findings for leptomeningeal disease, R/O sarcoid vs lymphoma   Hematology and rheum eval called  repeat scan per heme    LP done, cyto: negative fo malignant cells   LE DVT study positive: eliquis switched to heparin full AC for now   S/P colonoscopy for malignancy W/U  surg eval for LN biopsy if possible   IR eval called for core biopsy     # CVAs/TIAs  - Hx of TIAs in 2011 and 2013, CVAs x3 in 02/22, 8/22, and 11/22 with residual expressive aphasia   - on Eliquis and Aspirin at home for hx of stroke  - PFO work up in past negative, no evidence of PFO on MIMI X2  - c/w ASA   - c/w heparin gtt, was on hold, resume eliquis   - Restart Statin when LFTs and CPK improve per neurology  - Being worked up for Mixed Connective Tissue Disease OP- f/u p-ANCA, c-ANCA/ [id number] negative  - Neuro recs appreciated  - repeat brain MRI noted   - LE pain, check MRI noted     Plan For repeat LP with cytology   GI for colon thickening seen on CT , plan for colonoscopy next week     # Hypernatremia  trend Na level   renal eval appreciated  S/P hydration, trend Na level     #Thecal sac flattening   - CT thoracic and lumbar spine showing degenerative disc disease T6-T7 through L4-L5 and mild disc bulges at L2-L3 through L4-L5 that flatten the ventral thecal sac and narrowing of the bilateral neural foramina  - Will Monitor for now, will consider neuro/neurosurg evaluation in the future    #HLD  - atorvastatin on hold due to elevated CK and LFTs  - restart when possible for secondary stroke prevention    #?ASD/PFO  - Patient reportedly found to have a PFO in February 2022 during a stroke workup at [facility]. Patient presented for a PFO closure in November 2022. Notably, no PFO was found at the time and no intervention was performed by Dr. Lynn.    - TTE 11/5/22 EF 57% and grossly normal LV function  - MIMI performed bedside 12/5 1 of 2 studies (+) on bubble study (uploaded to [facility])  - elevated D d-andrew, LE DVT study positive       #Rhabdomyolysis  - CK 8720 on admission, peaked at 15k, now downtrending  - DDx: prolonged downtime myositis v hyperthermia? (T 105.4 F) v sepsis v seizure induced    - L Ankle and Knee pain, swelling  S/P Tap last week  cont to have pain and tenderness and swelling  Repeat imaging   Ortho follow up     # Elevated Winston level  Abd US noted    GI eval PRN   Trend LFTs, normalized, bili normalized.  no need for MRCP     #Partial SBO - resolved  - CT chest, abdomen, and pelvis w/ contrast concerning for possible partial SBO without transition point.  - Surgery consulted, no acute intervention at this time  - 12/5 KUB - negative for ileus or SBO  - Hold TF for 12/7 for possible trial of extubation.  - otherwise diet per nutrition    #Leukocytosis  - worsening leukocytosis  - monitor for fever  - Ortho eval fro knee swelling   - Pan Cx  - LP by IR, follow up results     #Concern for sepsis  Unclear source, aspiration v less likely meningitis   BCx (12/2 NGTD repeat 12/5 NGTD) and UC 12/2 NGTD  - Patient initially presented with AMS, T 105.4, tachycardic, and tachypneic   - UA negative  - s/p vanco and zosyn x1 in ED 12/2, ceftriaxone 2g BID 12/3-12/4, vanco 12/3-12/4  - 12/7 DCed Vanc since BCx from 12/5 NGTD  - c/w zosyn for aspiration pna presumed. completed course, monitor   - ID eval appreciated  - febrile again, Ortho for knee asp    # Anemia  noted, no gross bleeding  type and screen  GI eval called   R/O malignancy in view of thickening of the colon

## 2023-01-09 NOTE — PROGRESS NOTE ADULT - ATTENDING COMMENTS
Pt seen/examined, agree with above.  In view of pt's overall comorbidities, would benefit from IR needle bx of pelvic LN instead of surgery.    - please reconsult as needed

## 2023-01-09 NOTE — PROGRESS NOTE ADULT - ASSESSMENT
TAMI DUNHAM is a 52y Male who presents with a chief complaint of AMS    Abnormal Imaging Findings  - Imaging findings noted. Lumbar puncture did not show malignant cells.  - Colonoscopy performed on January 6th did not show overt mass.   - Imaging showed subcentimeter pelvic lymph nodes; surgery evaluation noted. They are recommending interventional radiology consultation for biopsy.   - Will follow-up on pathology results from above procedures.     Cytopenia  - Patient with mild anemia and thrombocytopenia  - Slightly worsening over the last few days.  - Likely reactive to medications or ongoing hospitalization issues.  - Monitor CBC and transfuse to maintain HGB > 7 and PLT > 10.    Will continue to follow.    Davis Coronado MD  Hematology/Oncology  O: [phone] TAMI DUNHAM is a 52y Male who presents with a chief complaint of AMS    Abnormal Imaging Findings  - Imaging findings noted. Lumbar puncture did not show malignant cells.  - Colonoscopy performed on January 6th did not show overt mass. Pathology returned as tubular adenoma.  - Imaging showed subcentimeter pelvic lymph nodes; surgery evaluation noted. They are recommending interventional radiology consultation for biopsy.   - Follow up neurology recommendations.   - Will follow-up on pathology results from above procedures.     Cytopenia  - Patient with mild anemia and thrombocytopenia  - Slightly worsening over the last few days.  - Likely reactive to medications or ongoing hospitalization issues.  - Monitor CBC and transfuse to maintain HGB > 7 and PLT > 10.    Will continue to follow.    Davis Coronado MD  Hematology/Oncology  O: [phone]

## 2023-01-09 NOTE — PROGRESS NOTE ADULT - ASSESSMENT
ASSESSMENT: 52-year-old male with a PMHx significant for TIAs (2011, 2013), CVAs x3 (02/2022 s/p tPA, 8/3/2022 s/p tPA, 11/5/2022 with residual expressive aphasia) on Eliquis, and HLD BIBEMS after being found unresponsive at home on the floor, last known well 12/1 at around noon. C-collar was placed. Patient was intubated in the ED for airway protection. CTH with no acute findings, vEEG with no identified seizures. Patient was weaned off pressors, extubated, and transitioned to floors 12/4/22. 12/5 am RRT called for seizure like activity with urinary incontinence, tongue biting, and R>L posturing, patient s/p ativan 2mg x3. Anesthesia called to RRT for intubation. MICU accepting patient for seizure like activity requiring intubation.    General Surgery consulted to R/O malignancy in given wall thickening of the colon on CT scan       PLAN:    - no acute surgical intervention indicated, reconsult prn  - recommend IR consult for core needle biopsy

## 2023-01-09 NOTE — PROGRESS NOTE ADULT - SUBJECTIVE AND OBJECTIVE BOX
Subjective: Patient seen and examined. No new events except as noted.     REVIEW OF SYSTEMS:    CONSTITUTIONAL: N+weakness, fevers or chills  EYES/ENT: No visual changes;  No vertigo or throat pain   NECK: No pain or stiffness  RESPIRATORY: No cough, wheezing, hemoptysis; No shortness of breath  CARDIOVASCULAR: No chest pain or palpitations  GASTROINTESTINAL: No abdominal or epigastric pain. No nausea, vomiting, or hematemesis; No diarrhea or constipation. No melena or hematochezia.  GENITOURINARY: No dysuria, frequency or hematuria  NEUROLOGICAL: No numbness or weakness  SKIN: No itching, burning, rashes, or lesions   All other review of systems is negative unless indicated above.    MEDICATIONS:  MEDICATIONS  (STANDING):  aspirin enteric coated 81 milliGRAM(s) Oral daily  atorvastatin 40 milliGRAM(s) Oral at bedtime  bisacodyl Suppository 10 milliGRAM(s) Rectal daily  chlorhexidine 2% Cloths 1 Application(s) Topical daily  dextrose 50% Injectable 25 Gram(s) IV Push once  dextrose 50% Injectable 12.5 Gram(s) IV Push once  dextrose 50% Injectable 25 Gram(s) IV Push once  dextrose Oral Gel 15 Gram(s) Oral once  diphtheria/tetanus/pertussis (acellular) Vaccine (Adacel) 0.5 milliLiter(s) IntraMuscular once  glucagon  Injectable 1 milliGRAM(s) IntraMuscular once  heparin  Infusion. 1600 Unit(s)/Hr (16 mL/Hr) IV Continuous <Continuous>  lacosamide IVPB 100 milliGRAM(s) IV Intermittent every 12 hours  levETIRAcetam  IVPB 1500 milliGRAM(s) IV Intermittent every 12 hours  lidocaine   4% Patch 1 Patch Transdermal every 24 hours  multivitamin 1 Tablet(s) Oral daily  potassium chloride   Powder 40 milliEquivalent(s) Oral every 4 hours      PHYSICAL EXAM:  T(C): 36.3 (01-09-23 @ 05:26), Max: 36.9 (01-09-23 @ 02:17)  HR: 51 (01-09-23 @ 05:26) (51 - 92)  BP: 141/86 (01-09-23 @ 05:26) (116/75 - 141/86)  RR: 18 (01-09-23 @ 05:26) (17 - 18)  SpO2: 98% (01-09-23 @ 05:26) (97% - 98%)  Wt(kg): --  I&O's Summary        General Appearance: NAD    Head: Normocephalic, atraumatic and no dysmorphic features  Ear, Nose, and Throat: Moist mucous membranes    CVS: S1S2+  Resp: No SOB, no wheeze or rhonchi  Abd: soft NTND  Extremities: No edema, no cyanosis  Skin: No bruises, no rashes  Neurological Exam:    Mental Status: Awake, Oriented x 2-3, able to follow simple and complex verbal commands. answers questions appropriately, able to name, repeat and recall. no aphasia, mild dysarthria (tongue bite)   Cranial Nerves: EOMI, PERRLA, VFF, V1-V3 sensation intact, no facial asymmetry, tongue midline, hearing intact B/L, shoulder shrug appropriate, SCM/trap intact.   Motor: Moving uppers > lowers. uppers at least 4+-5/5 ; lowers R>L 3-4/5   Sensation: intact to LT and PP   Reflexes: 1+ throughout at biceps, brachioradialis, triceps, patellars and ankles bilaterally and equal. No clonus. R toe and L toe were both downgoing.  Coordination: no dysmetria   Gait: unable         LABS:    CARDIAC MARKERS:                                10.2   13.02 )-----------( 134      ( 09 Jan 2023 05:22 )             31.3     01-09    140  |  105  |  18  ----------------------------<  105<H>  3.4<L>   |  27  |  0.82    Ca    8.4      09 Jan 2023 05:22  Phos  2.9     01-09  Mg     2.10     01-09      proBNP:   Lipid Profile:   HgA1c:   TSH:             TELEMETRY: 	    ECG:  	  RADIOLOGY:   DIAGNOSTIC TESTING:  [ ] Echocardiogram:  [ ]  Catheterization:  [ ] Stress Test:    OTHER: 	      < from: Colonoscopy (01.06.23 @ 07:36) >    [facility]  _______________________________________________________________________________  Patient Name: Estiven Simeon           Procedure Date: 1/6/2023 7:36 AM  MRN: [id number]                     Account Number: [id number]  YOB: 1970              Admit Type: Inpatient  Room: [place] [address]                         Gender: Male  Attending MD: ANIYAH LACEY MD        _______________________________________________________________________________     Procedure:          Colonoscopy  Indications:         Abnormal CT of the GI tract  Providers:           ANIYAH LACEY MD  Medicines:           Monitored Anesthesia Care  Complications:       No immediate complications.  Procedure:           Pre-Anesthesia Assessment:                       - Prior to the procedure, a History and Physical was                        performed, and patient medications and allergies were                        reviewed. The patient is competent. The risks and        benefits of the procedure and the sedation options and                        risks were discussed with the patient. All questions                        were answered and informed consent was obtained. Patient                        identification and proposed procedure were verified by                        the physician, the nurse, the anesthesiologist and the                        anesthetist in the pre-procedure area in the procedure                        room in the endoscopy suite.Mental Status Examination:                        alert and oriented. Prophylactic Antibiotics: The                        patient does not require prophylactic antibiotics. Prior                        Anticoagulants: The patient has taken heparin, last dose                        was day of procedure. ASA Grade Assessment: III - A                        patient with severe systemic disease. After reviewing                        the risks and benefits, the patient was deemed in         satisfactory condition to undergo the procedure. The                        anesthesia plan was to use monitored anesthesia care                        (MAC). Immediately prior to administration of                        medications, the patient was re-assessed for adequacy to                        receive sedatives. The heart rate, respiratory rate,                        oxygen saturations, blood pressure, adequacy of                        pulmonary ventilation, and response to care were                        monitored throughout the procedure. The physical status                        of the patient was re-assessed after the procedure.                       After I obtained informed consent, the scope was passed        under direct vision. Throughout the procedure, the                        patient's blood pressure, pulse, and oxygen saturations                        were monitored continuously. The Colonoscope was                        introduced through the anus and advanced to the cecum,                        identified by appendiceal orifice and ileocecal valve.                        The colonoscopy was performed without difficulty. The                        patient tolerated the procedure well. The quality of the                        bowel preparation was fair.                                                                                   Findings:       Non-bleeding internal hemorrhoids were found during retroflexion and        during endoscopy. The hemorrhoids were medium-sized.       A 15 mm polyp was found in the mid sigmoid colon. The polyp was        multi-lobulated. Biopsies were taken with a cold forceps for histology.        Polypectomy was not attempted given need for heparin drip.       A 3 mm polyp was found in the cecum. The polyp was sessile. Polypectomy        was not attempted given need for heparin drip.       A few small-mouthed diverticula were found in the sigmoid colon.       The exam was otherwise normal throughout the examined colon.                                                                                   Impression:          - Preparation of the colon was fair.                       - Non-bleeding internal hemorrhoids.                       -One 15 mm benign-appearing (ie adenomatous rather                        malignant) polyp in the mid sigmoid colon. Biopsied.                        Polypectomy was not attempted given need for heparin                        drip.    - One 3 mm polyp in the cecum. Polypectomy was not                        attempted given need for heparin drip.                       - Diverticulosis in the sigmoid colon.                       - No large masses seen.  Recommendation:      - Return patient to [facility] for ongoing care.                       - Await pathology results.                       - Diet per primary team.                       - OK to resume heparin drip.                       - Would recommend repeat colonoscopyin 3-6 months or                        when able to hold A/C for polyp removal.                                                                                   Attending Participation:       I personally performed the entire procedure.                                                                          _________________  ANIYAH LACEY MD  1/6/2023 9:13:09 AM  This report has been signed electronically.  Number of Addenda: 0    Note Initiated On: 1/6/2023 7:36 AM    < end of copied text >

## 2023-01-09 NOTE — PROVIDER CONTACT NOTE (OTHER) - BACKGROUND
admitted for AMS
51 y/o male admitted for altered mental status. Past medical history of TIAs and CVAs. Residual expressive aphasia.
Admitted for AMS

## 2023-01-10 LAB
ALBUMIN SERPL ELPH-MCNC: 2.9 G/DL — LOW (ref 3.3–5)
ALP SERPL-CCNC: 73 U/L — SIGNIFICANT CHANGE UP (ref 40–120)
ALT FLD-CCNC: 35 U/L — SIGNIFICANT CHANGE UP (ref 4–41)
ANION GAP SERPL CALC-SCNC: 10 MMOL/L — SIGNIFICANT CHANGE UP (ref 7–14)
APTT BLD: 77.7 SEC — HIGH (ref 27–36.3)
AST SERPL-CCNC: 15 U/L — SIGNIFICANT CHANGE UP (ref 4–40)
BILIRUB SERPL-MCNC: 0.3 MG/DL — SIGNIFICANT CHANGE UP (ref 0.2–1.2)
BUN SERPL-MCNC: 12 MG/DL — SIGNIFICANT CHANGE UP (ref 7–23)
CALCIUM SERPL-MCNC: 8.6 MG/DL — SIGNIFICANT CHANGE UP (ref 8.4–10.5)
CHLORIDE SERPL-SCNC: 102 MMOL/L — SIGNIFICANT CHANGE UP (ref 98–107)
CO2 SERPL-SCNC: 26 MMOL/L — SIGNIFICANT CHANGE UP (ref 22–31)
CREAT SERPL-MCNC: 0.78 MG/DL — SIGNIFICANT CHANGE UP (ref 0.5–1.3)
EGFR: 107 ML/MIN/1.73M2 — SIGNIFICANT CHANGE UP
GLUCOSE SERPL-MCNC: 90 MG/DL — SIGNIFICANT CHANGE UP (ref 70–99)
HCT VFR BLD CALC: 36.3 % — LOW (ref 39–50)
HGB BLD-MCNC: 11.8 G/DL — LOW (ref 13–17)
MAGNESIUM SERPL-MCNC: 2.1 MG/DL — SIGNIFICANT CHANGE UP (ref 1.6–2.6)
MCHC RBC-ENTMCNC: 29.8 PG — SIGNIFICANT CHANGE UP (ref 27–34)
MCHC RBC-ENTMCNC: 32.5 GM/DL — SIGNIFICANT CHANGE UP (ref 32–36)
MCV RBC AUTO: 91.7 FL — SIGNIFICANT CHANGE UP (ref 80–100)
NRBC # BLD: 0 /100 WBCS — SIGNIFICANT CHANGE UP (ref 0–0)
NRBC # FLD: 0.03 K/UL — HIGH (ref 0–0)
PHOSPHATE SERPL-MCNC: 4 MG/DL — SIGNIFICANT CHANGE UP (ref 2.5–4.5)
PLATELET # BLD AUTO: 135 K/UL — LOW (ref 150–400)
POTASSIUM SERPL-MCNC: 3.7 MMOL/L — SIGNIFICANT CHANGE UP (ref 3.5–5.3)
POTASSIUM SERPL-SCNC: 3.7 MMOL/L — SIGNIFICANT CHANGE UP (ref 3.5–5.3)
PROT SERPL-MCNC: 6 G/DL — SIGNIFICANT CHANGE UP (ref 6–8.3)
RBC # BLD: 3.96 M/UL — LOW (ref 4.2–5.8)
RBC # FLD: 14.3 % — SIGNIFICANT CHANGE UP (ref 10.3–14.5)
SODIUM SERPL-SCNC: 138 MMOL/L — SIGNIFICANT CHANGE UP (ref 135–145)
WBC # BLD: 8.96 K/UL — SIGNIFICANT CHANGE UP (ref 3.8–10.5)
WBC # FLD AUTO: 8.96 K/UL — SIGNIFICANT CHANGE UP (ref 3.8–10.5)

## 2023-01-10 RX ORDER — ENOXAPARIN SODIUM 100 MG/ML
95 INJECTION SUBCUTANEOUS EVERY 12 HOURS
Refills: 0 | Status: DISCONTINUED | OUTPATIENT
Start: 2023-01-10 | End: 2023-01-10

## 2023-01-10 RX ORDER — ENOXAPARIN SODIUM 100 MG/ML
100 INJECTION SUBCUTANEOUS EVERY 12 HOURS
Refills: 0 | Status: DISCONTINUED | OUTPATIENT
Start: 2023-01-10 | End: 2023-01-11

## 2023-01-10 RX ADMIN — HEPARIN SODIUM 1500 UNIT(S)/HR: 5000 INJECTION INTRAVENOUS; SUBCUTANEOUS at 08:26

## 2023-01-10 RX ADMIN — Medication 81 MILLIGRAM(S): at 12:08

## 2023-01-10 RX ADMIN — CHLORHEXIDINE GLUCONATE 1 APPLICATION(S): 213 SOLUTION TOPICAL at 12:07

## 2023-01-10 RX ADMIN — LEVETIRACETAM 400 MILLIGRAM(S): 250 TABLET, FILM COATED ORAL at 05:54

## 2023-01-10 RX ADMIN — TRAMADOL HYDROCHLORIDE 25 MILLIGRAM(S): 50 TABLET ORAL at 17:30

## 2023-01-10 RX ADMIN — Medication 1 TABLET(S): at 12:07

## 2023-01-10 RX ADMIN — Medication 10 MILLIGRAM(S): at 12:08

## 2023-01-10 RX ADMIN — LEVETIRACETAM 400 MILLIGRAM(S): 250 TABLET, FILM COATED ORAL at 17:21

## 2023-01-10 RX ADMIN — TRAMADOL HYDROCHLORIDE 25 MILLIGRAM(S): 50 TABLET ORAL at 18:25

## 2023-01-10 RX ADMIN — LIDOCAINE 1 PATCH: 4 CREAM TOPICAL at 19:37

## 2023-01-10 RX ADMIN — LACOSAMIDE 120 MILLIGRAM(S): 50 TABLET ORAL at 17:47

## 2023-01-10 RX ADMIN — LIDOCAINE 1 PATCH: 4 CREAM TOPICAL at 17:22

## 2023-01-10 RX ADMIN — HEPARIN SODIUM 1500 UNIT(S)/HR: 5000 INJECTION INTRAVENOUS; SUBCUTANEOUS at 07:28

## 2023-01-10 RX ADMIN — ATORVASTATIN CALCIUM 40 MILLIGRAM(S): 80 TABLET, FILM COATED ORAL at 21:32

## 2023-01-10 RX ADMIN — LACOSAMIDE 120 MILLIGRAM(S): 50 TABLET ORAL at 05:54

## 2023-01-10 NOTE — PROGRESS NOTE ADULT - NSPROGADDITIONALINFOA_GEN_ALL_CORE
- Counseling on diet, exercise, and medication adherence was done  - Counseling on smoking cessation and alcohol consumption offered when appropriate.  - Pain assessed and judicious use of narcotics when appropriate was discussed.    - Stroke education given when appropriate.  - Importance of fall prevention discussed.   - Differential diagnosis and plan of care discussed with patient and/or family and primary team
d/w pt and NP.    --- Coverage for Dr. Diez ---  - Dr. MANUELA Barker ([facility])  - [phone]
- Counseling on diet, exercise, and medication adherence was done  - Counseling on smoking cessation and alcohol consumption offered when appropriate.  - Pain assessed and judicious use of narcotics when appropriate was discussed.    - Stroke education given when appropriate.  - Importance of fall prevention discussed.   - Differential diagnosis and plan of care discussed with patient and/or family and primary team
D/w Dr. Diez
Will sign off, recall ID if needed #[phone].
D/w Dr. Diez
I am away on 12/16 and will return 12/19. Call ID office @ [phone] with questions.

## 2023-01-10 NOTE — PROGRESS NOTE ADULT - SUBJECTIVE AND OBJECTIVE BOX
Name of Patient : TAMI DUNHAM  MRN: [id number]  Date of visit: 01-10-23 @ 17:47      Subjective: Patient seen and examined. No new events except as noted.   doing okay     REVIEW OF SYSTEMS:    CONSTITUTIONAL: No weakness, fevers or chills  EYES/ENT: No visual changes;  No vertigo or throat pain   NECK: No pain or stiffness  RESPIRATORY: No cough, wheezing, hemoptysis; No shortness of breath  CARDIOVASCULAR: No chest pain or palpitations  GASTROINTESTINAL: No abdominal or epigastric pain. No nausea, vomiting, or hematemesis; No diarrhea or constipation. No melena or hematochezia.  GENITOURINARY: No dysuria, frequency or hematuria  NEUROLOGICAL: No numbness or weakness  SKIN: No itching, burning, rashes, or lesions   All other review of systems is negative unless indicated above.    MEDICATIONS:  MEDICATIONS  (STANDING):  aspirin enteric coated 81 milliGRAM(s) Oral daily  atorvastatin 40 milliGRAM(s) Oral at bedtime  bisacodyl Suppository 10 milliGRAM(s) Rectal daily  chlorhexidine 2% Cloths 1 Application(s) Topical daily  dextrose 50% Injectable 25 Gram(s) IV Push once  dextrose 50% Injectable 12.5 Gram(s) IV Push once  dextrose 50% Injectable 25 Gram(s) IV Push once  dextrose Oral Gel 15 Gram(s) Oral once  diphtheria/tetanus/pertussis (acellular) Vaccine (Adacel) 0.5 milliLiter(s) IntraMuscular once  glucagon  Injectable 1 milliGRAM(s) IntraMuscular once  heparin  Infusion. 1600 Unit(s)/Hr (16 mL/Hr) IV Continuous <Continuous>  lacosamide IVPB 100 milliGRAM(s) IV Intermittent every 12 hours  levETIRAcetam  IVPB 1500 milliGRAM(s) IV Intermittent every 12 hours  lidocaine   4% Patch 1 Patch Transdermal every 24 hours  multivitamin 1 Tablet(s) Oral daily      PHYSICAL EXAM:  T(C): 36.6 (01-10-23 @ 13:02), Max: 36.6 (01-10-23 @ 05:12)  HR: 68 (01-10-23 @ 13:02) (61 - 74)  BP: 108/68 (01-10-23 @ 13:02) (108/68 - 127/83)  RR: 17 (01-10-23 @ 13:02) (17 - 18)  SpO2: 100% (01-10-23 @ 13:02) (96% - 100%)  Wt(kg): --  I&O's Summary        Appearance: Normal	  HEENT:  PERRLA   Lymphatic: No lymphadenopathy   Cardiovascular: Normal S1 S2, no JVD  Respiratory: normal effort , clear  Gastrointestinal:  Soft, Non-tender  Skin: No rashes,  warm to touch  Psychiatry:  Mood & affect appropriate  Musculuskeletal: No edema      All labs, Imaging and EKGs personally reviewed                           11.8   8.96  )-----------( 135      ( 10 Gerardo 2023 06:20 )             36.3               01-10    138  |  102  |  12  ----------------------------<  90  3.7   |  26  |  0.78    Ca    8.6      10 Gerardo 2023 06:20  Phos  4.0     01-10  Mg     2.10     01-10    TPro  6.0  /  Alb  2.9<L>  /  TBili  0.3  /  DBili  x   /  AST  15  /  ALT  35  /  AlkPhos  73  01-10    PTT - ( 10 Gerardo 2023 06:20 )  PTT:77.7 sec

## 2023-01-10 NOTE — CONSULT NOTE ADULT - CONSULT REQUESTED DATE/TIME
13-Dec-2022
30-Dec-2022 15:31
12-Dec-2022 10:41
02-Dec-2022 22:20
09-Dec-2022 11:18
15-Dec-2022 14:42
04-Dec-2022 07:55
13-Dec-2022 12:07
04-Jan-2023 16:01
10-Gerardo-2023 13:41
23-Dec-2022 20:50
07-Jan-2023 18:22
30-Dec-2022
08-Dec-2022 13:46

## 2023-01-10 NOTE — PROGRESS NOTE ADULT - SUBJECTIVE AND OBJECTIVE BOX
Neurology Progress Note    S: Patient seen and examined ,  no significant neuro change     Medication:  MEDICATIONS  (STANDING):  aspirin enteric coated 81 milliGRAM(s) Oral daily  atorvastatin 40 milliGRAM(s) Oral at bedtime  bisacodyl Suppository 10 milliGRAM(s) Rectal daily  chlorhexidine 2% Cloths 1 Application(s) Topical daily  dextrose 50% Injectable 25 Gram(s) IV Push once  dextrose 50% Injectable 12.5 Gram(s) IV Push once  dextrose 50% Injectable 25 Gram(s) IV Push once  dextrose Oral Gel 15 Gram(s) Oral once  diphtheria/tetanus/pertussis (acellular) Vaccine (Adacel) 0.5 milliLiter(s) IntraMuscular once  glucagon  Injectable 1 milliGRAM(s) IntraMuscular once  heparin  Infusion. 1600 Unit(s)/Hr (16 mL/Hr) IV Continuous <Continuous>  lacosamide IVPB 100 milliGRAM(s) IV Intermittent every 12 hours  levETIRAcetam  IVPB 1500 milliGRAM(s) IV Intermittent every 12 hours  lidocaine   4% Patch 1 Patch Transdermal every 24 hours  multivitamin 1 Tablet(s) Oral daily    MEDICATIONS  (PRN):  heparin   Injectable 8000 Unit(s) IV Push every 6 hours PRN For aPTT less than 40  heparin   Injectable 4000 Unit(s) IV Push every 6 hours PRN For aPTT between 40 - 57  oxyCODONE    IR 5 milliGRAM(s) Oral every 6 hours PRN moderate and severe pain  traMADol 25 milliGRAM(s) Oral every 6 hours PRN Moderate Pain (4 - 6)        Vitals:    Vital Signs Last 24 Hrs  T(C): 36.6 (01-10-23 @ 13:02), Max: 36.6 (01-10-23 @ 05:12)  T(F): 97.8 (01-10-23 @ 13:02), Max: 97.9 (01-10-23 @ 05:12)  HR: 68 (01-10-23 @ 13:02) (61 - 74)  BP: 108/68 (01-10-23 @ 13:02) (108/68 - 127/83)  BP(mean): --  RR: 17 (01-10-23 @ 13:02) (17 - 18)  SpO2: 100% (01-10-23 @ 13:02) (96% - 100%)           General Exam:   General Appearance: Appropriately dressed and in no acute distress       Head: Normocephalic, atraumatic and no dysmorphic features  Ear, Nose, and Throat: Moist mucous membranes    CVS: S1S2+  Resp: No SOB, no wheeze or rhonchi  Abd: soft NTND  Extremities: No edema, no cyanosis  Skin: No bruises, no rashes     Neurological Exam:    Mental Status: Awake, Oriented x 2-3, able to follow simple and complex verbal commands. answers questions appropriately, able to name, repeat and recall. no aphasia, mild dysarthria (tongue bite)   Cranial Nerves: EOMI, PERRLA, VFF, V1-V3 sensation intact, no facial asymmetry, tongue midline, hearing intact B/L, shoulder shrug appropriate, SCM/trap intact.   Motor: Moving uppers > lowers. uppers at least 4+-5/5 ; lowers R>L 3-4/5   Sensation: intact to LT and PP   Reflexes: 1+ throughout at biceps, brachioradialis, triceps, patellars and ankles bilaterally and equal. No clonus. R toe and L toe were both downgoing.  Coordination: no dysmetria   Gait: unable     I personally reviewed the below data/images/labs:  CBC Full  -  ( 10 Gerardo 2023 06:20 )  WBC Count : 8.96 K/uL  RBC Count : 3.96 M/uL  Hemoglobin : 11.8 g/dL  Hematocrit : 36.3 %  Platelet Count - Automated : 135 K/uL  Mean Cell Volume : 91.7 fL  Mean Cell Hemoglobin : 29.8 pg  Mean Cell Hemoglobin Concentration : 32.5 gm/dL  Auto Neutrophil # : x  Auto Lymphocyte # : x  Auto Monocyte # : x  Auto Eosinophil # : x  Auto Basophil # : x  Auto Neutrophil % : x  Auto Lymphocyte % : x  Auto Monocyte % : x  Auto Eosinophil % : x  Auto Basophil % : x    01-10    138  |  102  |  12  ----------------------------<  90  3.7   |  26  |  0.78    Ca    8.6      10 Gerardo 2023 06:20  Phos  4.0     01-10  Mg     2.10     01-10    TPro  6.0  /  Alb  2.9<L>  /  TBili  0.3  /  DBili  x   /  AST  15  /  ALT  35  /  AlkPhos  73  01-10        < from: CT Head No Cont (12.02.22 @ 20:27) >    ACC: [id number] EXAM:  CT CERVICAL SPINE                        ACC: [id number] EXAM:  CT MAXILLOFACIAL                        ACC: [id number] EXAM:  CT BRAIN                          PROCEDURE DATE:  12/02/2022        INTERPRETATION:  CLINICAL INDICATION: Trauma.    Technique: Noncontrast axial CT of the head, facial bones, and cervical   spine was performed. 3-D reformats of the facial bones was obtained.   Coronal and sagittal reformats were obtained.      COMPARISON: None.    FINDINGS:  Head CT:  The ventricles and sulci are within normal limits. 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 There is no intraparenchymal hematoma, mass effect   or midline shift. No abnormal extra-axial fluid collections or   hemorrhages are present.    There is swelling of the left lateral scalp. The calvarium is intact.   There are scattered mucosal inflammatory changes in the paranasal sinuses.      Cervical spine CT:  Alignment ismaintained. Vertebral bodies are normal in height, without   evidence of fracture or dislocation. Prevertebral soft tissues are within   normal limits without soft tissue swelling or hematoma.    Intervertebral discs are intact. Neural foramina and spinal canal are   intact.    The visualized lung apices are within normal limits.      Facial bone CT:    No acute facial fracture.    There are scattered mucosal inflammatory changes in the paranasal   sinuses. Mastoid air cells are clear.    Soft tissues appear unremarkable.        IMPRESSION:  CT HEAD: No acute abnormality.  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  CT CERVICAL SPINE: No acute abnormality  CT FACIAL BONE: No acute abnormality    --- End of Report ---       DELGADO IVAN MD; Attending Radiologist  This document has been electronically signed. Dec  2 2022  9:02PM    < end of copied text >  < from: CT Lumbar Spine No Cont (12.02.22 @ 20:27) >    ACC: [id number] EXAM:  CT LUMBAR SPINE                        ACC: [id number] EXAM:  CT THORACIC SPINE                          PROCEDURE DATE:  12/02/2022          INTERPRETATION:  CT thoracic and lumbar spine without IV contrast    CLINICAL INFORMATION:  Trauma  Back pain, fracture.    TECHNIQUE:  Contiguous axial 2 mm sections were obtained through the   thoracic and lumbar spine using a single helical acquisition.     Additional 2 mm sagittal and coronal reconstructions of the spine were   obtained. These additional reformatted images were used to evaluate the   spine for alignment, vertebral fractures and the integrity of the the   posterior elements.   This scan was performed using automatic exposure   control (radiation dose reduction software) to obtain a diagnostic image   quality scan with patient dose as low as reasonably achievable.    FINDINGS:   No prior similar studies are available for review    Thoracic and lumbar vertebral body heights are maintained. No vertebral   fracture is seen. No destructive bone lesion is found.  Alignment is   preserved.  Facet joints appear intact and aligned.    Thoracic and lumbar intervertebral disc spaces appear intact.   Degenerative disc disease and spondylosis is noted at T6-T7 throughL4-5   with loss of disc height and associated degenerative endplate changes.   Mild disc bulges at L2-3 through L4-5 flatten the ventral thecal sac and   narrow the BILATERAL neural foramina.    No paraspinal mass is recognized.  Paraspinal soft tissues appear intact.      IMPRESSION:  Degenerative disc disease and spondylosis is noted at T6-T7   through L4-5 with loss of disc height and associated degenerative   endplate changes. Mild disc bulges at L2-3 through L4-5 flatten the   ventral thecal sac and narrow the BILATERAL neural foramina   No   vertebral fracture is recognized.    --- End of Report ---       ANGIE ESCOBAR MD; Attending Radiologist  This document has been electronically signed. Dec  2 2022  8:55PM    < end of copied text >    EEG Classification / Summary:  Abnormal EEG in a comatose patient due to diffuse suppression gradually improving to discontinuous background, with right hemispheric relative attenuation/suppression. No epileptiform abnormalities or seizures are captured.    Clinical Impression:  Severe diffuse cerebral dysfunction that gradually improves is nonspecific in etiology. In this case, it may be related to sedating medication (propofol) with improvement after decreasing and stopping the medication.  Right hemispheric focal cerebral dysfunction can be structural or functional in etiology.      12/7  Clinical Impression:  -Occasional runs of right frontal lateralized periodic discharges (LPDs) at up to 1.3 Hz indicate a potentially epileptogenic focus in the right frontal region and are on the ictal-interictal continuum.  -A pattern on the ictal-interictal continuum is a pattern that does not qualify as an electrographic seizure or electrographic status epilepticus, but there is a reasonable chance that it may be contributing to impaired alertness, causing other clinical symptoms, and/or contributing to neuronal injury.  -Right hemispheric relative attenuation indicates right hemispheric focal cerebral dysfunction, which can be structural or functional in etiology.  -Rare left frontal epileptiform discharges indicate a potentially epileptogenic focus in this reigon  -Severe diffuse slowing and GRDA indicate severe diffuse cerebral dysfunction of nonspecific etiology.  -No electrographic seizures are captured.     < from: MR Head w/wo IV Cont (12.09.22 @ 19:54) >    ACC: [id number] EXAM:  MR BRAIN WAW IC                          PROCEDURE DATE:  12/09/2022          INTERPRETATION:  CLINICAL INDICATION: CVA, found unresponsive at home      Magnetic resonance imaging of the brain was carried out with transaxial   SPGR, FLAIR, fast spin echo T2 weighted images, axial susceptibility   weighted series, diffusion weighted series and sagittal T1 weighted   series on a 1.5 Maritza magnet. Post contrast axial, coronal and sagittal   T1 weighted images were obtained. 10cc of Gadavist were intravenously   injected, 0 cc were discarded.      Comparison is made with the prior brain CT of 12/5/2022 and MRI 11/5/2022.    Mild ventricular and sulcal prominence is consistent with moderate   atrophy for the patient's age. No acute hemorrhage is identified. There   is a focus of hemosiderin within the alexis which is calcified on CT.   Scattered additional foci of hemosiderin deposition are identified in the   left frontal subcortical white matter and right occipital cortex is   nonspecific but may be related to multiple cavernoma is or hypertension.    There is a tiny focus of diffusion restriction in the right frontal   subcortical white matter and right centrum semiovale which are unchanged   since the prior exam and may represent subacute infarcts. Multiplicity   infarcts may be related to hypercoagulable state or cardioembolic events.    After contrast administration there is normal intracranial vascular   enhancement. No abnormal parenchymal or leptomeningeal enhancement.      IMPRESSION: Atrophy for the patient's age. Right frontal subcortical and   right centrum semiovale punctate infarcts are unchanged since 11/5/2022   and likely represent subacute infarcts. No abnormal enhancement. Focus of   calcification in the alexis with old hemosiderin. A few additional   scattered foci of hemosiderin deposition are unchanged.    --- End of Report ---     < from: MR Head w/wo IV Cont (12.29.22 @ 13:39) >    ACC: [id number] EXAM:  MR BRAIN WAW IC                          PROCEDURE DATE:  12/29/2022          INTERPRETATION:  CLINICAL INFORMATION: Follow-up study for infarct seen   on prior MR 12/9/2022. Patient initially presented to the hospital after   being found unresponsive.    TECHNIQUE: MRI of the brain was performed with and without contrast.   Sagittal and axial T1, axial T2, FLAIR, SWI, diffusion-weighted images   and an ADC map were obtained.    9.5 cc of Gadavist was injected, with 0.5 cc discarded.    COMPARISON: MR brain 12/9/2022    FINDINGS:    Previously seen foci of diffusion restriction in the right frontal   subcortical white matter and right centrum semiovale no longer restricted   diffusion and represent chronic infarcts. No new areas of infarction are   identified.    Foci of susceptibility artifact in the right occipital and right parietal   lobe, unchanged. No acute intracranial hemorrhage. No mass effect or   midline shift.    Areas of contrast enhancement in the bilateral putamen (12-18).   Enhancement of the alexis is also noted.    Moderate prominence of the sulci and ventricles..    There are foci of T2/FLAIR signal hyperintensity within the hemispheric   white matter, which are nonspecific but likely related tosequelae of   microvascular disease.    No abnormal extra-axial fluid collections are present.    Major flow-voids at the base of the brain follow expected course and   contour.    The calvarium is intact. Right ethmoid sinus mucosal thickening.    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 No new areas of infarct are identified. Unchanged scattered   foci of hemosiderin.    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   Differential diagnosis includes infection, sarcoidosis, lymphoma and   CLIPPERS (chronic lymphocytic inflammation with pontine perivascular   enhancement responsive to steroids).    --- End of Report ---         CARMEN KONG MD; Resident Radiologist  This document has been electronically signed.  TANVIR LUNA MD; Attending Radiologist  This document has been electronically signed. Dec 29 2022  5:08PM    < end of copied text >  < from: MR Lower Ext Joint No Cont, Left (01.03.23 @ 10:14) >  N:    1.  Normal appearance of left deep peroneal and tibial nerves at the   level of the ankle.  2.  Acute on chronic denervation edema of left intrinsic hindfoot   musculature.  3.  Acute on chronic left plantar fasciitis with nonacute grade 2   sprain/1.3 cm low to moderate grade partial-thickness tear at the   calcaneal origin of the central band.  4.  Additional lower grade and chronic findings as detailed in the body   section of this report.    --- End of Report ---       < from: MR Lumbar Spine w/wo IV Cont (01.03.23 @ 12:29) >    ACC: [id number] EXAM:  MR SPINE LUMBAR WAW IC                          PROCEDURE DATE:  01/03/2023          INTERPRETATION:  CLINICAL INFORMATION: Foot drop    MRI of the lumbar spine was performed using sagittal T1, T2 and STIR   sequence.    Mild loss of the normal lumbar lordosis is seen.    The vertebral body height and alignment appear normal    Disc desiccation is seen involving the lower thoracic and lumbar spine   region. These findings are secondary to chronic degenerative changes.    T12-L1: Normal    L1-2: Normal    L2-3: Normal    L3-4: Mild disc bulge is seen. No significant compromise of the spinal   canal or either neural foramen    L4-5: Disc bulge is identified. There is abnormal T2 prolongation   associated with the central portion of this disc bulge, this is likely   compatible with an annular fissure. Minimal effacement of ventral thecal   sac is seen. Bilateral hypertrophic facet joint changes. No significant   compromise of the spinal canal.    L5-S1: Normal    The conus ends at L2 and appears normal    Evaluation of the paraspinal soft tissues appear normal    IMPRESSION: Degenerative changes as described above.    --- End of Report ---            TA DANIEL MD; Attending Radiologist  This document has been electronically signed. Gerardo  3 2023  1:13PM    < end of copied text >

## 2023-01-10 NOTE — PROGRESS NOTE ADULT - ASSESSMENT
52-year-old male with a PMHx significant for TIAs (2011, 2013), CVAs x3 (02/2022 s/p tPA, 8/3/2022 s/p tPA, 11/5/2022 with residual expressive aphasia) on Eliquis, and HLD BIBEMS after being found unresponsive at home on the floor, last known well 12/1 at around noon. C-collar was placed. Patient was intubated in the ED for airway protection. CTH with no acute findings, vEEG with no identified seizures. Patient was weaned off pressors, extubated, and transitioned to floors 12/4/22. 12/5 am RRT called for seizure like activity with urinary incontinence, tongue biting, and R>L posturing, patient s/p ativan 2mg x3. Anesthesia called to RRT for intubation. MICU accepting patient for seizure like activity requiring intubation.    # AMS, Seizure like activity  EEG 12/7: concern for epileptiform activity; though EEG from 12/5 without such abnormalities.   - f/u neuro recs  - Patient found down and unresponsive at home on 12/2, last known well 12/1 at around noon. Intubated in ED 12/2, extubated in MICU 12/4  - RRT 12/5: seizure like acting with urinary incontinence, convulsions, and tongue biting; s/p ativan 2mg x3 with pauses in seizure activity following each; intubated for airway protection with rocuronium. Prolactin ~75   - CTH and CT cervical spine 12/2 negative for any acute pathologies. Tox screen on admission negative. 12/5: Stable exam from prior CT head, chronic microvascular ischemic changes, parenchymal loss, dystrophic calcification of central portion of alexis unchanged.  - Video EEG 12/3-12/4 without any seizure like activity, moderate to severe nonspecific diffuse or multifocal cerebral dysfunction  - c/w keppra 1500mg q12 maintenance, Keppra loaded 4.5g  - patient extubated again on 12/8th  - MRI noted, chronic CVA, no acute CVA noted     MRI noted, new findings for leptomeningeal disease, R/O sarcoid vs lymphoma   Hematology and rheum eval called  repeat scan per heme    LP done, cyto: negative fo malignant cells   LE DVT study positive: eliquis switched to heparin full AC for now , can switch to lovenox IV BID on discharge   S/P colonoscopy for malignancy W/U  surg eval for LN biopsy if possible , no LN palpable for biopsy  IR eval called for core biopsy , unable to get, no windows     # CVAs/TIAs  - Hx of TIAs in 2011 and 2013, CVAs x3 in 02/22, 8/22, and 11/22 with residual expressive aphasia   - on Eliquis and Aspirin at home for hx of stroke  - PFO work up in past negative, no evidence of PFO on MIMI X2  - c/w ASA   - c/w heparin gtt, was on hold, resume eliquis   - Restart Statin when LFTs and CPK improve per neurology  - Being worked up for Mixed Connective Tissue Disease OP- f/u p-ANCA, c-ANCA/ [id number] negative  - Neuro recs appreciated  - repeat brain MRI noted   - LE pain, check MRI noted     Plan For repeat LP with cytology   GI for colon thickening seen on CT   noted colonoscopy     # Hypernatremia  trend Na level   renal eval appreciated  S/P hydration, trend Na level     #Thecal sac flattening   - CT thoracic and lumbar spine showing degenerative disc disease T6-T7 through L4-L5 and mild disc bulges at L2-L3 through L4-L5 that flatten the ventral thecal sac and narrowing of the bilateral neural foramina    #HLD  - atorvastatin on hold due to elevated CK and LFTs  - restart when possible for secondary stroke prevention    #?ASD/PFO  - Patient reportedly found to have a PFO in February 2022 during a stroke workup at [place]. Patient presented for a PFO closure in November 2022. Notably, no PFO was found at the time and no intervention was performed by Dr. Lynn.    - TTE 11/5/22 EF 57% and grossly normal LV function  - MIMI performed bedside 12/5 1 of 2 studies (+) on bubble study (uploaded to [facility])  - elevated D d-andrew, LE DVT study positive       #Rhabdomyolysis  - CK 8720 on admission, peaked at 15k, now downtrending  - DDx: prolonged downtime myositis v hyperthermia? (T 105.4 F) v sepsis v seizure induced      # Elevated Winston level  Abd US noted    GI eval PRN   Trend LFTs, normalized, bili normalized.  no need for MRCP     #Partial SBO - resolved  - CT chest, abdomen, and pelvis w/ contrast concerning for possible partial SBO without transition point.  - Surgery consulted, no acute intervention at this time  - 12/5 KUB - negative for ileus or SBO  - Hold TF for 12/7 for possible trial of extubation.  - otherwise diet per nutrition    #Leukocytosis  - worsening leukocytosis  - monitor for fever  - Ortho eval fro knee swelling   - Pan Cx  - LP by IR, follow up results     #Concern for sepsis  Unclear source, aspiration v less likely meningitis   BCx (12/2 NGTD repeat 12/5 NGTD) and UC 12/2 NGTD  - Patient initially presented with AMS, T 105.4, tachycardic, and tachypneic   - UA negative  - s/p vanco and zosyn x1 in ED 12/2, ceftriaxone 2g BID 12/3-12/4, vanco 12/3-12/4  - 12/7 DCed Vanc since BCx from 12/5 NGTD  - c/w zosyn for aspiration pna presumed. completed course, monitor   - ID eval appreciated  - febrile again, Ortho for knee asp    # Anemia  noted, no gross bleeding  type and screen  GI eval called   R/O malignancy in view of thickening of the colon       DC planing to rehab with outpatient follow up

## 2023-01-10 NOTE — PROGRESS NOTE ADULT - ASSESSMENT
52-year-old  M known to me from outpatient setting with  TIAs (2011, 2013), Strokes x3 (02/2022 s/p tPA, 8/3/2022 s/p tPA, 11/5/2022 with residual expressive aphasia) on Eliquis, was on testosterone outpatient stopped after last stroke, HLD BIBEMS after being found unresponsive  Temp 105.4 on arrival tachy and /110. 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   CT cervical spine and maxillofacial scans negative.  CT chest, abdomen, and pelvis w/ contrast concerning for possible partial SBO without transition point. Surgery consulted in ED, no acute surgical intervention at this time.   CT thoracic and lumbar spine showing degenerative disc disease T6-T7 through L4-L5 and mild disc bulges at L2-L3 through L4-L5 that flatten the ventral thecal sac and narrowing of the bilateral neural foramina.  MRI 11/2022: R centrum semiovale and R posterior frontal infarcts   takes adderall at home   + rhabdo  EEG no seizures   + transaminitis   CTH 12/5 stable   outpatient hypercoag workup neg   12/5 extubated then reintibuated for seizure activity and tx back to ICU   EEG this AM 12/5 with only slowing   EEG 12/6 slowing but no seizures   spoke with Pippa Conti (friend) who states after he was taken off the testosterone he ordered a mail order testosterone supplement, unclear if he started it yet, unclear what this was  12/7 EEG no electrogtraphic seizures captured but R LPDs, rare L frontal discharges, severe GRDA.   12/8 EEG improved.  extubated. following some commands   12/9 moving uppers> lowers   12/11 AAOx2 uppers 4-5/5; not moving LLE well   MRI brain neg for new stroke   s/p L knee tap arthrocentesis  by ortho on 12/12   spoke with friend pippa conti - she counted his adderrall and didn't take extra tables. did find ashwagandha and red wood (bottle was sealed) supplement in his apartment   s/p LP 12/5 --> CSF glucose and protein WNL.  will f/u remaining studies. non infectious appearing --> paraneoplastic was neg   o/e AAOx2, slurred but 2/2 tongue bite   + transaminitis   more alert  12/20 neuro exam improving AAOx2-3   c/o L ankle pain in addition to knee  + COVID   MRI brain 12/29: chronic R frontal and R centrum semiovale infarcts unchanged.  more conspicuous and newer bilateral lentiform nucleus and pontine enhancement of unclear etiology. ddx CLIPPERS, sarcoid, lymphoma vs infection.  (reviewed [facility] neuro radiology Dr. fried)   LE doppler L DVT   MRI ankle. : acute on chronic edema left intrinsic hindfoot musculature.  chronic plantar fascitis.   MRI L spine with degenerative changes   family hx of susac in cousin  Is/p LP 1/4 f/u flow and cytology   completed another round of high dose steroids     Impression:   1) AMS of unclear etiology, possible now seizure, related to adderral withdrawal? 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 had MIMI was question of PFO but not found. s/p ILR   3) bilateral lentiform nucleus and pontine enhacement     - s/p LP 1/4 f/u cytology, if neg would consider LN bx easier to bx than brain , spoke with rheum   - f/u MRI L spine ordered and MR Lower extremity --> doesn't really explain foot drop.  will try to get EMG but not always available at [facility] and may not .   - agree with bx if able for tissue pathology   - CT C A P --> neg.  --> LE doppler L DVT   - unclear of what to make of new MRI findings from 12/29, doubt related to covid as was present minimally on prior MRI.   lower suspicion for CLIPPERS.  would workup for lymphoma and sarcoid at this point. consider LN bx   - covid precuations    -  L ankle imaging --. podiatry.   - resume AC s/p LP when able given L knee --> eliquis 5mg BID restarted but now changed to heparin drip   - possible MRCP planning for Monday. GI f/u.  elevated LFTs --> LFTs improved--> downtrending . MRCP deferred   - ortho for L knee s/p tap / arthrocentesis   -  Vimpat 100mg BID    - keppra 1500mg BID.   - fever on arrival, treatred initially for meningitis.  was on abx for aspiration PNA.  >LP done--> non  infectious   - IVF  - CPK elevated. trend improving   -   endocrine workup/eval   - there was some concern outpatient he may have a mixed  connective tissue disorder   - statin therapy for secondary stroke prevention when LFTS and CPK improved   - telemetry  - PT/OT/SS/SLP, OOBC  - check FS, glucose control <180  - GI/DVT ppx  - Thank you for allowing me to participate in the care of this patient. Call with questions.   - spoke with friend at bedside, Galilea 12/11   - spoke with Pippa Conti at [phone] for more collateral info and to provide update. spoke again 12/12 over phone. spoke 12/19, 1/3    Braxton Forde MD  Vascular Neurology  Office: [phone]

## 2023-01-10 NOTE — CONSULT NOTE ADULT - REASON FOR ADMISSION
Unresponsive

## 2023-01-10 NOTE — PROGRESS NOTE ADULT - SUBJECTIVE AND OBJECTIVE BOX
Subjective: Patient seen and examined. No new events except as noted.     REVIEW OF SYSTEMS:    CONSTITUTIONAL: + weakness, fevers or chills  EYES/ENT: No visual changes;  No vertigo or throat pain   NECK: No pain or stiffness  RESPIRATORY: No cough, wheezing, hemoptysis; No shortness of breath  CARDIOVASCULAR: No chest pain or palpitations  GASTROINTESTINAL: No abdominal or epigastric pain. No nausea, vomiting, or hematemesis; No diarrhea or constipation. No melena or hematochezia.  GENITOURINARY: No dysuria, frequency or hematuria  NEUROLOGICAL: No numbness or weakness  SKIN: No itching, burning, rashes, or lesions   All other review of systems is negative unless indicated above.    MEDICATIONS:  MEDICATIONS  (STANDING):  aspirin enteric coated 81 milliGRAM(s) Oral daily  atorvastatin 40 milliGRAM(s) Oral at bedtime  bisacodyl Suppository 10 milliGRAM(s) Rectal daily  chlorhexidine 2% Cloths 1 Application(s) Topical daily  dextrose 50% Injectable 25 Gram(s) IV Push once  dextrose 50% Injectable 12.5 Gram(s) IV Push once  dextrose 50% Injectable 25 Gram(s) IV Push once  dextrose Oral Gel 15 Gram(s) Oral once  diphtheria/tetanus/pertussis (acellular) Vaccine (Adacel) 0.5 milliLiter(s) IntraMuscular once  glucagon  Injectable 1 milliGRAM(s) IntraMuscular once  heparin  Infusion. 1600 Unit(s)/Hr (16 mL/Hr) IV Continuous <Continuous>  lacosamide IVPB 100 milliGRAM(s) IV Intermittent every 12 hours  levETIRAcetam  IVPB 1500 milliGRAM(s) IV Intermittent every 12 hours  lidocaine   4% Patch 1 Patch Transdermal every 24 hours  multivitamin 1 Tablet(s) Oral daily      PHYSICAL EXAM:  T(C): 36.6 (01-10-23 @ 13:02), Max: 36.6 (01-10-23 @ 05:12)  HR: 68 (01-10-23 @ 13:02) (61 - 74)  BP: 108/68 (01-10-23 @ 13:02) (108/68 - 110/71)  RR: 17 (01-10-23 @ 13:02) (17 - 18)  SpO2: 100% (01-10-23 @ 13:02) (97% - 100%)  Wt(kg): --  I&O's Summary            General Appearance: NAD    Head: Normocephalic, atraumatic and no dysmorphic features  Ear, Nose, and Throat: Moist mucous membranes    CVS: S1S2+  Resp: No SOB, no wheeze or rhonchi  Abd: soft NTND  Extremities: No edema, no cyanosis  Skin: No bruises, no rashes  Neurological Exam:    Mental Status: Awake, Oriented x 2-3, able to follow simple and complex verbal commands. answers questions appropriately, able to name, repeat and recall. no aphasia, mild dysarthria (tongue bite)   Cranial Nerves: EOMI, PERRLA, VFF, V1-V3 sensation intact, no facial asymmetry, tongue midline, hearing intact B/L, shoulder shrug appropriate, SCM/trap intact.   Motor: Moving uppers > lowers. uppers at least 4+-5/5 ; lowers R>L 3-4/5   Sensation: intact to LT and PP   Reflexes: 1+ throughout at biceps, brachioradialis, triceps, patellars and ankles bilaterally and equal. No clonus. R toe and L toe were both downgoing.  Coordination: no dysmetria   Gait: unable         LABS:    CARDIAC MARKERS:                                11.8   8.96  )-----------( 135      ( 10 Gerardo 2023 06:20 )             36.3     01-10    138  |  102  |  12  ----------------------------<  90  3.7   |  26  |  0.78    Ca    8.6      10 Gerardo 2023 06:20  Phos  4.0     01-10  Mg     2.10     01-10    TPro  6.0  /  Alb  2.9<L>  /  TBili  0.3  /  DBili  x   /  AST  15  /  ALT  35  /  AlkPhos  73  01-10    proBNP:   Lipid Profile:   HgA1c:   TSH:             TELEMETRY: 	    ECG:  	  RADIOLOGY:   DIAGNOSTIC TESTING:  [ ] Echocardiogram:  [ ]  Catheterization:  [ ] Stress Test:    OTHER:

## 2023-01-10 NOTE — CHART NOTE - NSCHARTNOTEFT_GEN_A_CORE
Pathology from biopsies of the sigmoid polyp preformed on recent colonoscopy on 1/6/2023 notable for tubular adenoma. This was NOT removed during the procedure due to concern for bleeding as patient was on AC for new DVT,  Would recommend repeat colonoscopy in 3-6 months or when able to hold A/C for polyp removal.    Recommendations:  -Repeat colonoscopy in 3-6 months or when able to hold A/C for polyp removal.          -Please provide patient with Gastroenterology Clinic number to confirm/arrange appointment; [phone] (Faculty Practice at [address] [facility]) or [phone] ([facility] at [address] [place] [address]) or [phone] ([facility] at [address] [facility]).    Be Sullivan, PGY-4  Gastroenterology/Hepatology Fellow  Available on Microsoft Teams   [phone] (Long Range Pager)  [phone] (Short Range Pager [facility])    After 5pm, please contact the on-call GI fellow. [phone]

## 2023-01-10 NOTE — CONSULT NOTE ADULT - PROVIDER SPECIALTY LIST ADULT
Intervent Radiology
Nephrology
Surgery
Podiatry
Surgery
Gastroenterology
Heme/Onc
Rheumatology
Surgery
Infectious Disease
Gastroenterology
Orthopedics
Neurology
Cardiology

## 2023-01-11 ENCOUNTER — TRANSCRIPTION ENCOUNTER (OUTPATIENT)
Age: 53
End: 2023-01-11

## 2023-01-11 VITALS
DIASTOLIC BLOOD PRESSURE: 84 MMHG | TEMPERATURE: 97 F | HEART RATE: 78 BPM | SYSTOLIC BLOOD PRESSURE: 120 MMHG | OXYGEN SATURATION: 98 % | RESPIRATION RATE: 18 BRPM

## 2023-01-11 LAB
HCT VFR BLD CALC: 39.5 % — SIGNIFICANT CHANGE UP (ref 39–50)
HGB BLD-MCNC: 13 G/DL — SIGNIFICANT CHANGE UP (ref 13–17)
MCHC RBC-ENTMCNC: 30.1 PG — SIGNIFICANT CHANGE UP (ref 27–34)
MCHC RBC-ENTMCNC: 32.9 GM/DL — SIGNIFICANT CHANGE UP (ref 32–36)
MCV RBC AUTO: 91.4 FL — SIGNIFICANT CHANGE UP (ref 80–100)
NRBC # BLD: 0 /100 WBCS — SIGNIFICANT CHANGE UP (ref 0–0)
NRBC # FLD: 0 K/UL — SIGNIFICANT CHANGE UP (ref 0–0)
PLATELET # BLD AUTO: 148 K/UL — LOW (ref 150–400)
RBC # BLD: 4.32 M/UL — SIGNIFICANT CHANGE UP (ref 4.2–5.8)
RBC # FLD: 14.4 % — SIGNIFICANT CHANGE UP (ref 10.3–14.5)
SARS-COV-2 RNA SPEC QL NAA+PROBE: SIGNIFICANT CHANGE UP
TM INTERPRETATION: SIGNIFICANT CHANGE UP
WBC # BLD: 10.64 K/UL — HIGH (ref 3.8–10.5)
WBC # FLD AUTO: 10.64 K/UL — HIGH (ref 3.8–10.5)

## 2023-01-11 RX ORDER — ENOXAPARIN SODIUM 100 MG/ML
100 INJECTION SUBCUTANEOUS
Qty: 0 | Refills: 0 | DISCHARGE
Start: 2023-01-11

## 2023-01-11 RX ORDER — TRAMADOL HYDROCHLORIDE 50 MG/1
0.5 TABLET ORAL
Qty: 0 | Refills: 0 | DISCHARGE
Start: 2023-01-11

## 2023-01-11 RX ORDER — LIDOCAINE 4 G/100G
1 CREAM TOPICAL
Qty: 0 | Refills: 0 | DISCHARGE
Start: 2023-01-11

## 2023-01-11 RX ORDER — DEXTROAMPHETAMINE SACCHARATE, AMPHETAMINE ASPARTATE, DEXTROAMPHETAMINE SULFATE AND AMPHETAMINE SULFATE 1.875; 1.875; 1.875; 1.875 MG/1; MG/1; MG/1; MG/1
15 TABLET ORAL
Qty: 0 | Refills: 0 | DISCHARGE

## 2023-01-11 RX ORDER — OXYCODONE HYDROCHLORIDE 5 MG/1
1 TABLET ORAL
Qty: 0 | Refills: 0 | DISCHARGE
Start: 2023-01-11

## 2023-01-11 RX ADMIN — LEVETIRACETAM 400 MILLIGRAM(S): 250 TABLET, FILM COATED ORAL at 05:06

## 2023-01-11 RX ADMIN — ENOXAPARIN SODIUM 100 MILLIGRAM(S): 100 INJECTION SUBCUTANEOUS at 05:06

## 2023-01-11 RX ADMIN — LACOSAMIDE 120 MILLIGRAM(S): 50 TABLET ORAL at 05:06

## 2023-01-11 RX ADMIN — LIDOCAINE 1 PATCH: 4 CREAM TOPICAL at 05:42

## 2023-01-11 RX ADMIN — Medication 81 MILLIGRAM(S): at 12:10

## 2023-01-11 RX ADMIN — CHLORHEXIDINE GLUCONATE 1 APPLICATION(S): 213 SOLUTION TOPICAL at 12:09

## 2023-01-11 RX ADMIN — Medication 10 MILLIGRAM(S): at 12:10

## 2023-01-11 RX ADMIN — Medication 1 TABLET(S): at 12:10

## 2023-01-11 NOTE — CHART NOTE - NSCHARTNOTESELECT_GEN_ALL_CORE
Event Note
MAR Accept note/Transfer Note
Nutrition Services
Nutrition Services
POCUS/Event Note
Transfer Note
ACP/Event Note
Chart Note Rheumatology/Event Note
EEG prelim
Endocrinology/Event Note
Event Note
GI Fellow/Off Service Note
Invasive Procedure Team/Event Note
MICU Accept Note
MIMI/Event Note
Neurology
Nutrition Services
Nutrition Services
POCUS/Event Note
Rheumatology Chart Note/Event Note
Transfer Note
eeg prelim

## 2023-01-11 NOTE — PROGRESS NOTE ADULT - PROBLEM SELECTOR PLAN 2
12/5 RRT for seizure like activity    - reintubated  12/7 vEEG concern for epileptiform activity  extubated 12/8  MRI H - as above  c/w meds as ordered
-better  -likely from hemarthrosis   -trend cbc   -blood cx x 2 negative  -check cdiff if diarrhea  -Lp not c/w meningitis
12/5 RRT for seizure like activity    - reintubated  12/7 vEEG concern for epileptiform activity  extubated 12/8  MRI H - as above  c/w meds as ordered
12/5 RRT for seizure like activity    - reintubated  12/7 vEEG concern for epileptiform activity  extubated 12/8  pending MRI H   c/w meds as ordered  management as per ICU
-worse  -likely from hemarthrosis   -trend cbc   -check blood cx x 2  -check cdiff if diarrhea
12/5 RRT for seizure like activity    - reintubated  12/7 vEEG concern for epileptiform activity  extubated 12/8  MRI H - as above  c/w meds as ordered
-better  -likely from hemarthrosis   -trend cbc   -blood cx x 2 negative  -check cdiff if diarrhea
12/5 RRT for seizure like activity    - reintubated  12/7 vEEG concern for epileptiform activity  extubated 12/8  MRI H - as above  c/w meds as ordered
-worse  -likely from hemarthrosis   -trend cbc   -check blood cx x 2  -check cdiff if diarrhea

## 2023-01-11 NOTE — DISCHARGE NOTE PROVIDER - NSFOLLOWUPCLINICS_GEN_ALL_ED_FT
[facility]  Hematology/Oncology  [address] [place] [address]  Phone: [phone]  Fax:   Follow Up Time: 1 week    Gastroenterology at [facility]  Gastroenterology  [address] [place] [address]  Phone: [phone]  Fax:

## 2023-01-11 NOTE — CHART NOTE - NSCHARTNOTEFT_GEN_A_CORE
Pt seen for NUTRITION FOLLOW UP     Medical Course: 52-year-old male with a PMHx significant for TIAs (2011, 2013), CVAs x3 (02/2022 s/p tPA, 8/3/2022 s/p tPA, 11/5/2022 with residual expressive aphasia) on Eliquis, and HLD BIBEMS after being found unresponsive at home on the floor, last known well 12/1 at around noon. C-collar was placed. Patient was intubated in the ED for airway protection. CTH with no acute findings, vEEG with no identified seizures. Patient was weaned off pressors, extubated, and transitioned to floors 12/4/22. 12/5 am RRT called for seizure like activity with urinary incontinence, tongue biting, and R>L posturing, patient s/p ativan 2mg x3. Anesthesia called to RRT for intubation. MICU accepting patient for seizure like activity requiring intubation.    Nutrition Course:  Pt sleeping at time of visit. Information obtained from comprehensive chart review and per RN today:  No recent episodes of nausea, vomiting, diarrhea or constipation reported, BM noted on 1/8 per RN flowsheets. Continue to monitor and document BMs in flowsheets. Consider bowel regimen if no BM continues. Noted with order for bisacodyl suppository. No reports of any chewing/swallowing difficulties. Food preferences explored and noted. Intake is 50-75% per RN flowsheets. Feeding skills: set up help required.     Diet Prescription: Diet, Regular:   DASH/TLC {Sodium & Cholesterol Restricted} (DASH) (01-06-23 @ 10:53)    Pertinent Medications: MEDICATIONS  (STANDING):  aspirin enteric coated 81 milliGRAM(s) Oral daily  atorvastatin 40 milliGRAM(s) Oral at bedtime  bisacodyl Suppository 10 milliGRAM(s) Rectal daily  chlorhexidine 2% Cloths 1 Application(s) Topical daily  dextrose 50% Injectable 25 Gram(s) IV Push once  dextrose 50% Injectable 12.5 Gram(s) IV Push once  dextrose 50% Injectable 25 Gram(s) IV Push once  dextrose Oral Gel 15 Gram(s) Oral once  diphtheria/tetanus/pertussis (acellular) Vaccine (Adacel) 0.5 milliLiter(s) IntraMuscular once  enoxaparin Injectable 100 milliGRAM(s) SubCutaneous every 12 hours  glucagon  Injectable 1 milliGRAM(s) IntraMuscular once  lacosamide IVPB 100 milliGRAM(s) IV Intermittent every 12 hours  levETIRAcetam  IVPB 1500 milliGRAM(s) IV Intermittent every 12 hours  lidocaine   4% Patch 1 Patch Transdermal every 24 hours  multivitamin 1 Tablet(s) Oral daily    MEDICATIONS  (PRN):  oxyCODONE    IR 5 milliGRAM(s) Oral every 6 hours PRN moderate and severe pain  traMADol 25 milliGRAM(s) Oral every 6 hours PRN Moderate Pain (4 - 6)    Pertinent Labs: 01-10 Na138 mmol/L Glu 90 mg/dL K+ 3.7 mmol/L Cr  0.78 mg/dL BUN 12 mg/dL 01-10 Phos 4.0 mg/dL 01-10 Alb 2.9 g/dL<L>      Weight: Height (cm): 190.5 (01-06 @ 07:22)  Weight (kg): 95.7 (01-06 @ 07:22), 95.8 (12-02)  BMI (kg/m2): 26.4 (01-06 @ 07:22)  Weight Assessment: Wt stable x1 month       Physical Assessment, per flowsheets:  Edema: b/l ankles  Skin: intact     Estimated Needs:   [X] No change since previous assessment    Previous Nutrition Diagnosis:   New Nutrition Diagnosis: [x ] not applicable     Interventions:   1) Continue on current PO diet rx: DASH/TLC   2) Continue on multivitamin once daily for micronutrient provisions   3) RD to f/u prn     Monitor & Evaluate:  PO intake, tolerance to diet/supplement, nutrition related lab values, weight trends, BMs/GI distress, hydration status, skin integrity.      Monitor & Evaluate:  PO intake, tolerance to diet/supplement, nutrition related lab values, weight trends, BMs/GI distress, hydration status, skin integrity.    Izzy Bassett MS, RDN (Pager #[phone]) | Also available on TEAMS

## 2023-01-11 NOTE — DISCHARGE NOTE NURSING/CASE MANAGEMENT/SOCIAL WORK - PATIENT PORTAL LINK FT
You can access the FollowMyHealth Patient Portal offered by  by registering at the following website: [email or web address]. By joining [employer]’s FollowMyHealth portal, you will also be able to view your health information using other applications (apps) compatible with our system.

## 2023-01-11 NOTE — DISCHARGE NOTE PROVIDER - CARE PROVIDER_API CALL
Rodolfo Johnson  Internal Medicine  [address]  [place] [address]  Phone: [phone]  Fax: [phone]  Follow Up Time: 2 weeks    Conner Rios  Orthopedic Surgery  [address]  [place] [address]  Phone: [phone]  Fax: [phone]  Follow Up Time:

## 2023-01-11 NOTE — DISCHARGE NOTE PROVIDER - NSDCCPCAREPLAN_GEN_ALL_CORE_FT
PRINCIPAL DISCHARGE DIAGNOSIS  Diagnosis: Altered mental status  Assessment and Plan of Treatment: You were admitted due to Change in your mental Status, You were Intubated, You were treated for possible infection ( Presumed aspiration pneumonia)  You had en extenisve work up and was found with some abnormalities in Your MRI of the BRain, you underwent a lumbar puncture which was normal.  you were Started on Medications for possible Seizure disorder.  You will need Close Follow up with Neurology (Dr Forde) and Hematology/Oncology to Complete your work up for possible Lymphoma or Sarcoidosis      SECONDARY DISCHARGE DIAGNOSES  Diagnosis: Sepsis  Assessment and Plan of Treatment: You met Criteria for sepsis , you were treated with antibiotics. Please Follow up with Your PMD In 1-2 weeks    Diagnosis: Acute deep vein thrombosis (DVT) of lower extremity, unspecified laterality, unspecified vein  Assessment and Plan of Treatment: You were Found with a clot in your Left Lower Extremity , Your eliquis was changed to Lovenox, Please  Continue as prescribed    Diagnosis: HLD (hyperlipidemia)  Assessment and Plan of Treatment: Low fat diet, exercise daily and continue current medications. Follow up with primary care physician and cardiologist for management.      Diagnosis: CVA (cerebrovascular accident)  Assessment and Plan of Treatment: Continue physical therapy and skills learned for rehabilitation.  Follow up with your neurologist in 1-2 weeks for further medical management.  Continue to take your medications as prescribed, low salt, low fat, and diabetic diet.  Consider joining a support group for stroke survivors for emotional support to prevent depression.      Diagnosis: Plantar fasciitis, left  Assessment and Plan of Treatment: Currently in night splint, will help avoid contracture (please ensure keeping at 90 degree angle), still believe proximally derived, at this juncture PT and avoiding contracture are paramount to avoid long term rigid equinus   Please Follow up with Gabriel    Diagnosis: Colon polyp  Assessment and Plan of Treatment: This was NOT removed during the procedure due to concern for bleeding as patient was on AC for new DVT,  Would recommend repeat colonoscopy in 3-6 months or when able to hold A/C for polyp removal.  Recommendations:  -Repeat colonoscopy in 3-6 months or when able to hold A/C for polyp removal.          -Please provide patient with Gastroenterology Clinic number to confirm/arrange appointment; [phone] (Faculty Practice at [address] [place] [address]) or [phone] ([facility] at [address] [place] [address]) or [phone] ([facility] at [address] [place] [address]).

## 2023-01-11 NOTE — PROGRESS NOTE ADULT - PROBLEM SELECTOR PLAN 3
found to have a PFO in February 2022    - was following with Dr. Lynn for closure  no PFO found on cardiac cath in November - no intervention needed
found to have a PFO in February 2022  no PFO found on cardiac cath in November - no intervention needed
found to have a PFO in February 2022    - was following with Dr. Lynn for closure  no PFO found on cardiac cath in November - no intervention needed
-better   -likely from knee hemarthrosis   -blood cx x 2 negative  -monitor temps   -RVP negative  -check CT C/A/P if fever recurs  -CSF studies negative
found to have a PFO in February 2022    - was following with Dr. Lynn for closure  no PFO found on cardiac cath in November - no intervention needed
-better   -likely from knee hemarthrosis   -blood cx x 2 negative  -monitor temps   -RVP negative  -check CT C/A/P if fever recurs
-better   -likely from knee hemarthrosis   -check blood cx x 2  -monitor temps   -RVP negative  -planned for LP per medicine  -start vancomycin, CTX if fever recurs
found to have a PFO in February 2022    - was following with Dr. Lynn for closure  no PFO found on cardiac cath in November - no intervention needed
-likely from knee hemarthrosis   -check blood cx x 2  -monitor temps   -check RVP  -planned for LP per medicine  -start vancomycin, CTX if fever recurs

## 2023-01-11 NOTE — DISCHARGE NOTE PROVIDER - HOSPITAL COURSE
52M PMHx TIAs (2011, 2013), CVAs x3 (02/2022 s/p tPA, 8/3/2022 s/p tPA, 11/5/2022 with residual expressive aphasia) on Eliquis, and HLD BIBEMS after being found unresponsive at home. Intubated in the ED and monitored in MICU. Pt was weaned off pressors, extubated, and transitioned to floors 12/4. 12/5 am RRT called for seizures, again intubated then extubated 12/8. C/b partial SBO on CT now resolved. C/b Sepsis now resolved, unclear what the etiology was. S/p Zosyn for presumed asp PNA. S/p LP 12/15 non infectious. Large L knee joint effusion--> s/p arthrocentesis - NG     # AMS, s/p V EEG : without any seizure activity   - c/w keppra 1500mg q12 maintenance, Keppra loaded 4.5g  - MRI noted, chronic CVA, no acute CVA noted, new findings for leptomeningeal disease, R/O sarcoid vs lymphoma   AMS of unclear etiology, possible now seizure, related to adderral withdrawal? 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 had MIMI was question of PFO but not found. s/p ILR   3) bilateral lentiform nucleus and pontine enhancement   LP done, cyto: negative fo malignant cells     LE DVT study positive: eliquis switched to heparin full AC for now , can switch to lovenox IV BID on discharge   S/P colonoscopy for malignancy W/U  surg eval for LN biopsy if possible , no LN palpable for biopsy  IR eval called for core biopsy , unable to get, no windows     # CVAs/TIAs  - Hx of TIAs in 2011 and 2013, CVAs x3 in 02/22, 8/22, and 11/22 with residual expressive aphasia   - on Eliquis and Aspirin at home for hx of stroke  - PFO work up in past negative, no evidence of PFO on MIMI X2  - c/w ASA   - c/w heparin gtt, was on hold, resume eliquis   - Restart Statin when LFTs and CPK improve per neurology  - Being worked up for Mixed Connective Tissue Disease OP- f/u p-ANCA, c-ANCA/ [id number] negative  - Neuro recs appreciated  - repeat brain MRI noted   - LE pain, check MRI noted     Hematology and rheum eval called  LP done, cyto: negative fo malignant cells   LE DVT study positive: eliquis switched to heparin full AC for now , can switch to lovenox IV BID on discharge   S/P colonoscopy for malignancy W/U  surg eval for LN biopsy if possible , no LN palpable for biopsy  IR eval called for core biopsy , unable to get, no windows     # CVAs/TIAs  - Hx of TIAs in 2011 and 2013, CVAs x3 in 02/22, 8/22, and 11/22 with residual expressive aphasia   - on Eliquis and Aspirin at home for hx of stroke  - PFO work up in past negative, no evidence of PFO on MIMI X2  - c/w ASA   - c/w heparin gtt, was on hold, resume eliquis   - Restart Statin when LFTs and CPK improve per neurology  - Being worked up for Mixed Connective Tissue Disease OP- f/u p-ANCA, c-ANCA/ [id number] negative  - Neuro recs appreciated  - repeat brain MRI noted   - LE pain, check MRI noted        52M PMHx TIAs (2011, 2013), CVAs x3 (02/2022 s/p tPA, 8/3/2022 s/p tPA, 11/5/2022 with residual expressive aphasia) on Eliquis, and HLD BIBEMS after being found unresponsive at home. Intubated in the ED and monitored in MICU. Pt was weaned off pressors, extubated, and transitioned to floors 12/4. 12/5 am RRT called for seizures, again intubated then extubated 12/8. C/b partial SBO on CT now resolved. C/b Sepsis now resolved, unclear what the etiology was. S/p Zosyn for presumed asp PNA. S/p LP 12/15 non infectious. Large L knee joint effusion--> s/p arthrocentesis - NG     # AMS, s/p V EEG : without any seizure activity , however as per neurology will c/w keppra 1500mg q12 maintenance, Keppra loaded 4.5g  - MRI noted, chronic CVA, no acute CVA noted, new findings for leptomeningeal disease, R/O sarcoid vs lymphoma   AMS of unclear etiology, possible now seizure, related to adderral withdrawal? 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 had MIMI was question of PFO but not found. s/p ILR   bilateral lentiform nucleus and pontine enhancement   unclear of what to make of new MRI findings from 12/29, doubt related to covid as was present minimally on prior MRI.   lower suspicion for CLIPPERS.  would workup for lymphoma and sarcoid at this point. Pt to follow up with Neurology and Hematalogy/Oncology as outpatient   LP done, cyto: negative fo malignant cells   Pt to follow up with Neurology and Hematalogy/ Oncology as outpatient     LE DVT study positive: eliquis switched to heparin full AC for now , can switch to lovenox IV BID on discharge     s/p CT Abd : questionable thickening vs. underdistention of rectum. Subcentimeter pelvic LN. Noted on CT abd, s/p Colonoscopy  Polyp biopsied, Diverticulosis  General Surgery was consulted  given wall thickening of the colon on CT scan - no acute surgical intervention indicated- recs IR cs for core needle biopsy of LN -IR declined, no window to Bx 1/10    hospital Course was also notable for loss of Left Foot  dorsiflexion , s/p MRI LLE: neg for nerve impingement. Acute on chronic denervation edema of L intrinsic hindfoot musculature. Acute on chronic left plantar fasciitis w/ nonacute grade 2 sprain/1.3cm low to moderate grade partial-thickness tear at the calcaneal origin of the central band.  Pt was seen by the orthodist ans is currently wearing a plantar fasciitis Brace   Pt was seen by rheumatology and extensive work up and was found with : negative SHAY/RF/CCP/PR3/MPO/dsDNA/SS/A/SS/B CSF and serum ACE negative, and paraneoplastic negative  Given negative SHAY/dsDNA/Sjogren's Ab, SLE is unlikely     Case d iscussed with attending, Pt is stable for Discharge with outpatient Follow up with Dr Johnson ( Medicine), Hematology/oncology Clinic and Neurology  ( Dr Forde) Follow up

## 2023-01-11 NOTE — PROGRESS NOTE ADULT - PROBLEM SELECTOR PROBLEM 4
CVA (cerebrovascular accident)
Hemarthrosis of joint
CVA (cerebrovascular accident)
Hemarthrosis of joint
CVA (cerebrovascular accident)
CVA (cerebrovascular accident)
Hemarthrosis of joint
CVA (cerebrovascular accident)
Hemarthrosis of joint

## 2023-01-11 NOTE — PROGRESS NOTE ADULT - PROBLEM SELECTOR PLAN 4
Hx of TIAs in 2011 and 2013, CVAs x3 in 02/22, 8/22, and 11/22 with residual expressive aphasia    - on ASA/Eliquis (eliquis on hold)  on Lovenox  s/p colonoscopy 1/6 - benign polyp

## 2023-01-11 NOTE — DISCHARGE NOTE PROVIDER - CARE PROVIDERS DIRECT ADDRESSES
,DirectAddress_Unknown,[email or web address]

## 2023-01-11 NOTE — DISCHARGE NOTE NURSING/CASE MANAGEMENT/SOCIAL WORK - NSDCPEFALRISK_GEN_ALL_CORE
For information on Fall & Injury Prevention, visit: [email or web address] OR  [email or web address] OR  https://www.cdc.gov/steadi/patient.html

## 2023-01-11 NOTE — DISCHARGE NOTE PROVIDER - NSDCMRMEDTOKEN_GEN_ALL_CORE_FT
Adderall 15 mg oral tablet: 15 milligram(s) orally once a day  apixaban 5 mg oral tablet: 1 tab(s) orally every 12 hours restart 11/23 Am  aspirin 81 mg oral delayed release tablet: 1 tab(s) orally once a day  atorvastatin 40 mg oral tablet: 1 tab(s) orally once a day   aspirin 81 mg oral delayed release tablet: 1 tab(s) orally once a day  atorvastatin 40 mg oral tablet: 1 tab(s) orally once a day  bisacodyl 10 mg rectal suppository: 1 suppository(ies) rectal once a day  enoxaparin: 100 milligram(s) subcutaneous 2 times a day  Keppra: 1500 milligram(s) orally 2 times a day  lidocaine 4% topical film: Apply topically to affected area once a day  Multiple Vitamins oral tablet: 1 tab(s) orally once a day  oxyCODONE 5 mg oral tablet: 1 tab(s) orally every 6 hours, As needed, moderate and severe pain  traMADol 50 mg oral tablet: 0.5 tab(s) orally every 6 hours, As needed, Moderate Pain (4 - 6)  Vimpat 100 mg oral tablet: 1 tab(s) orally 2 times a day

## 2023-01-11 NOTE — PROGRESS NOTE ADULT - SUBJECTIVE AND OBJECTIVE BOX
Subjective: Patient seen and examined. No new events except as noted.     REVIEW OF SYSTEMS:    CONSTITUTIONAL: +Weakness, fevers or chills  EYES/ENT: No visual changes;  No vertigo or throat pain   NECK: No pain or stiffness  RESPIRATORY: No cough, wheezing, hemoptysis; No shortness of breath  CARDIOVASCULAR: No chest pain or palpitations  GASTROINTESTINAL: No abdominal or epigastric pain. No nausea, vomiting, or hematemesis; No diarrhea or constipation. No melena or hematochezia.  GENITOURINARY: No dysuria, frequency or hematuria  NEUROLOGICAL: No numbness or weakness  SKIN: No itching, burning, rashes, or lesions   All other review of systems is negative unless indicated above.    MEDICATIONS:  MEDICATIONS  (STANDING):  aspirin enteric coated 81 milliGRAM(s) Oral daily  atorvastatin 40 milliGRAM(s) Oral at bedtime  bisacodyl Suppository 10 milliGRAM(s) Rectal daily  chlorhexidine 2% Cloths 1 Application(s) Topical daily  dextrose 50% Injectable 25 Gram(s) IV Push once  dextrose 50% Injectable 12.5 Gram(s) IV Push once  dextrose 50% Injectable 25 Gram(s) IV Push once  dextrose Oral Gel 15 Gram(s) Oral once  diphtheria/tetanus/pertussis (acellular) Vaccine (Adacel) 0.5 milliLiter(s) IntraMuscular once  enoxaparin Injectable 100 milliGRAM(s) SubCutaneous every 12 hours  glucagon  Injectable 1 milliGRAM(s) IntraMuscular once  lacosamide IVPB 100 milliGRAM(s) IV Intermittent every 12 hours  levETIRAcetam  IVPB 1500 milliGRAM(s) IV Intermittent every 12 hours  lidocaine   4% Patch 1 Patch Transdermal every 24 hours  multivitamin 1 Tablet(s) Oral daily      PHYSICAL EXAM:  T(C): 36.6 (01-11-23 @ 05:05), Max: 36.7 (01-10-23 @ 21:15)  HR: 78 (01-11-23 @ 05:05) (68 - 78)  BP: 115/75 (01-11-23 @ 05:05) (108/68 - 119/80)  RR: 17 (01-11-23 @ 05:05) (17 - 18)  SpO2: 98% (01-11-23 @ 05:05) (97% - 100%)  Wt(kg): --  I&O's Summary        Appearance: Normal	  HEENT:   Normal oral mucosa, PERRL, EOMI	  Lymphatic: No lymphadenopathy , no edema  Cardiovascular: Normal S1 S2, No JVD, No murmurs , Peripheral pulses palpable 2+ bilaterally  Respiratory: Lungs clear to auscultation, normal effort 	  Gastrointestinal:  Soft, Non-tender, + BS	  Skin: No rashes, No ecchymoses, No cyanosis, warm to touch  Musculoskeletal: Normal range of motion, normal strength  Psychiatry:  Mood & affect appropriate  Ext: No edema      LABS:    CARDIAC MARKERS:                                13.0   10.64 )-----------( 148      ( 11 Jan 2023 06:19 )             39.5     01-10    138  |  102  |  12  ----------------------------<  90  3.7   |  26  |  0.78    Ca    8.6      10 Gerardo 2023 06:20  Phos  4.0     01-10  Mg     2.10     01-10    TPro  6.0  /  Alb  2.9<L>  /  TBili  0.3  /  DBili  x   /  AST  15  /  ALT  35  /  AlkPhos  73  01-10    proBNP:   Lipid Profile:   HgA1c:   TSH:             TELEMETRY: 	    ECG:  	  RADIOLOGY:   DIAGNOSTIC TESTING:  [ ] Echocardiogram:  [ ]  Catheterization:  [ ] Stress Test:    OTHER:

## 2023-01-11 NOTE — PROGRESS NOTE ADULT - PROVIDER SPECIALTY LIST ADULT
Cardiology
Colorectal Surgery
Internal Medicine
Nephrology
Neurology
Podiatry
Surgery
Surgery
Heme/Onc
Infectious Disease
Internal Medicine
MICU
Nephrology
Neurology
Podiatry
Podiatry
Surgery
Gastroenterology
Gastroenterology
Heme/Onc
Infectious Disease
Internal Medicine
Nephrology
Nephrology
Neurology
Rheumatology
Surgery
Surgery
Cardiology
Heme/Onc
Internal Medicine
MICU
Nephrology
Neurology
Podiatry
Cardiology
Neurology
Cardiology
Cardiology
Infectious Disease
Cardiology
Neurology
Neurology
Cardiology
Infectious Disease
Cardiology
Infectious Disease
Cardiology

## 2023-01-11 NOTE — PROGRESS NOTE ADULT - PROBLEM SELECTOR PROBLEM 2
Seizure
Leukocytosis
Leukocytosis
Seizure
Leukocytosis
Seizure
Leukocytosis
Seizure
Leukocytosis

## 2023-01-11 NOTE — PROGRESS NOTE ADULT - PROBLEM SELECTOR PROBLEM 1
Unresponsiveness
Pneumonia, aspiration
Unresponsiveness
Pneumonia, aspiration

## 2023-01-11 NOTE — PROGRESS NOTE ADULT - PROBLEM SELECTOR PROBLEM 6
HLD (hyperlipidemia)

## 2023-01-11 NOTE — PROGRESS NOTE ADULT - PROBLEM SELECTOR PROBLEM 5
History of TIAs

## 2023-01-11 NOTE — PROGRESS NOTE ADULT - PROBLEM SELECTOR PROBLEM 3
PFO (patent foramen ovale)
Fever
PFO (patent foramen ovale)
Fever
PFO (patent foramen ovale)
Fever
Fever

## 2023-01-11 NOTE — PROGRESS NOTE ADULT - PROBLEM SELECTOR PLAN 5
as above

## 2023-01-12 LAB
AMPHIPHYSIN AB TITR CSF: NEGATIVE TITER — SIGNIFICANT CHANGE UP
CV2 IGG TITR CSF: NEGATIVE TITER — SIGNIFICANT CHANGE UP
GLIAL NUC TYPE 1 AB TITR CSF: NEGATIVE TITER — SIGNIFICANT CHANGE UP
HU1 AB TITR CSF IF: NEGATIVE TITER — SIGNIFICANT CHANGE UP
HU2 AB TITR CSF IF: NEGATIVE TITER — SIGNIFICANT CHANGE UP
HU3 AB TITR CSF: NEGATIVE TITER — SIGNIFICANT CHANGE UP
PARANEOPLASTIC INTERPRETATION, CSF: SIGNIFICANT CHANGE UP
PCA-TR AB TITR CSF: NEGATIVE TITER — SIGNIFICANT CHANGE UP
PURKINJE CELL CYTOPLASMIC AB TYPE 2: NEGATIVE TITER — SIGNIFICANT CHANGE UP
PURKINJE CELLS AB TITR CSF IF: NEGATIVE TITER — SIGNIFICANT CHANGE UP
REFLEX ADDED: SIGNIFICANT CHANGE UP

## 2023-01-14 LAB
CULTURE RESULTS: SIGNIFICANT CHANGE UP
SPECIMEN SOURCE: SIGNIFICANT CHANGE UP

## 2023-01-17 ENCOUNTER — TRANSCRIPTION ENCOUNTER (OUTPATIENT)
Age: 53
End: 2023-01-17

## 2023-01-17 ENCOUNTER — INPATIENT (INPATIENT)
Facility: HOSPITAL | Age: 53
LOS: 19 days | Discharge: NURSING, REHAB OR LONG-TERM CARE | End: 2023-02-06
Attending: INTERNAL MEDICINE | Admitting: INTERNAL MEDICINE
Payer: COMMERCIAL

## 2023-01-17 VITALS
HEIGHT: 75 IN | TEMPERATURE: 99 F | RESPIRATION RATE: 18 BRPM | SYSTOLIC BLOOD PRESSURE: 139 MMHG | OXYGEN SATURATION: 100 % | DIASTOLIC BLOOD PRESSURE: 75 MMHG | HEART RATE: 114 BPM

## 2023-01-17 PROCEDURE — 99285 EMERGENCY DEPT VISIT HI MDM: CPT

## 2023-01-17 NOTE — ED ADULT TRIAGE NOTE - CHIEF COMPLAINT QUOTE
pt presents with AMS, left leg pain and swelling. pt d/c last Wednesday after 40 day admission. PMHx cellulitis, DVT left leg, CVA, seizures. family friends states patient has been having trouble finding words and is behaving strangely. LKW 5pm yesterday.

## 2023-01-18 DIAGNOSIS — R41.82 ALTERED MENTAL STATUS, UNSPECIFIED: ICD-10-CM

## 2023-01-18 PROBLEM — R41.89 OTHER SYMPTOMS AND SIGNS INVOLVING COGNITIVE FUNCTIONS AND AWARENESS: Chronic | Status: ACTIVE | Noted: 2022-12-08

## 2023-01-18 LAB
ALBUMIN SERPL ELPH-MCNC: 3.1 G/DL — LOW (ref 3.3–5)
ALP SERPL-CCNC: 64 U/L — SIGNIFICANT CHANGE UP (ref 40–120)
ALT FLD-CCNC: 48 U/L — HIGH (ref 4–41)
ANION GAP SERPL CALC-SCNC: 9 MMOL/L — SIGNIFICANT CHANGE UP (ref 7–14)
APPEARANCE UR: CLEAR — SIGNIFICANT CHANGE UP
APTT BLD: 33.1 SEC — SIGNIFICANT CHANGE UP (ref 27–36.3)
APTT BLD: 62.8 SEC — HIGH (ref 27–36.3)
AST SERPL-CCNC: 91 U/L — HIGH (ref 4–40)
BACTERIA # UR AUTO: NEGATIVE — SIGNIFICANT CHANGE UP
BASE EXCESS BLDV CALC-SCNC: 1.6 MMOL/L — SIGNIFICANT CHANGE UP (ref -2–3)
BASOPHILS # BLD AUTO: 0 K/UL — SIGNIFICANT CHANGE UP (ref 0–0.2)
BASOPHILS NFR BLD AUTO: 0 % — SIGNIFICANT CHANGE UP (ref 0–2)
BILIRUB SERPL-MCNC: 0.6 MG/DL — SIGNIFICANT CHANGE UP (ref 0.2–1.2)
BILIRUB UR-MCNC: NEGATIVE — SIGNIFICANT CHANGE UP
BLD GP AB SCN SERPL QL: NEGATIVE — SIGNIFICANT CHANGE UP
BLOOD GAS VENOUS COMPREHENSIVE RESULT: SIGNIFICANT CHANGE UP
BUN SERPL-MCNC: 15 MG/DL — SIGNIFICANT CHANGE UP (ref 7–23)
CALCIUM SERPL-MCNC: 8.8 MG/DL — SIGNIFICANT CHANGE UP (ref 8.4–10.5)
CHLORIDE BLDV-SCNC: 102 MMOL/L — SIGNIFICANT CHANGE UP (ref 96–108)
CHLORIDE SERPL-SCNC: 97 MMOL/L — LOW (ref 98–107)
CO2 BLDV-SCNC: 27.9 MMOL/L — HIGH (ref 22–26)
CO2 SERPL-SCNC: 25 MMOL/L — SIGNIFICANT CHANGE UP (ref 22–31)
COLOR SPEC: YELLOW — SIGNIFICANT CHANGE UP
CREAT SERPL-MCNC: 0.77 MG/DL — SIGNIFICANT CHANGE UP (ref 0.5–1.3)
DIFF PNL FLD: ABNORMAL
EGFR: 108 ML/MIN/1.73M2 — SIGNIFICANT CHANGE UP
EOSINOPHIL # BLD AUTO: 0 K/UL — SIGNIFICANT CHANGE UP (ref 0–0.5)
EOSINOPHIL NFR BLD AUTO: 0 % — SIGNIFICANT CHANGE UP (ref 0–6)
EPI CELLS # UR: 0 /HPF — SIGNIFICANT CHANGE UP (ref 0–5)
FERRITIN SERPL-MCNC: 480 NG/ML — HIGH (ref 30–400)
FLUAV AG NPH QL: SIGNIFICANT CHANGE UP
FLUBV AG NPH QL: SIGNIFICANT CHANGE UP
FOLATE SERPL-MCNC: 12.5 NG/ML — SIGNIFICANT CHANGE UP (ref 3.1–17.5)
GAS PNL BLDV: 131 MMOL/L — LOW (ref 136–145)
GLUCOSE BLDC GLUCOMTR-MCNC: 140 MG/DL — HIGH (ref 70–99)
GLUCOSE BLDV-MCNC: 137 MG/DL — HIGH (ref 70–99)
GLUCOSE SERPL-MCNC: 143 MG/DL — HIGH (ref 70–99)
GLUCOSE UR QL: NEGATIVE — SIGNIFICANT CHANGE UP
HAPTOGLOB SERPL-MCNC: 363 MG/DL — HIGH (ref 34–200)
HCO3 BLDV-SCNC: 27 MMOL/L — SIGNIFICANT CHANGE UP (ref 22–29)
HCT VFR BLD CALC: 21.5 % — LOW (ref 39–50)
HCT VFR BLD CALC: 22.2 % — LOW (ref 39–50)
HCT VFR BLDA CALC: 23 % — LOW (ref 39–51)
HGB BLD CALC-MCNC: 7.5 G/DL — LOW (ref 13–17)
HGB BLD-MCNC: 7.1 G/DL — LOW (ref 13–17)
HGB BLD-MCNC: 7.3 G/DL — LOW (ref 13–17)
HYALINE CASTS # UR AUTO: SIGNIFICANT CHANGE UP (ref 0–7)
IANC: 15.91 K/UL — HIGH (ref 1.8–7.4)
INR BLD: 1.21 RATIO — HIGH (ref 0.88–1.16)
IRON SATN MFR SERPL: 11 UG/DL — LOW (ref 45–165)
KETONES UR-MCNC: ABNORMAL
LACTATE BLDV-MCNC: 1.4 MMOL/L — SIGNIFICANT CHANGE UP (ref 0.5–2)
LDH SERPL L TO P-CCNC: 274 U/L — HIGH (ref 135–225)
LEUKOCYTE ESTERASE UR-ACNC: ABNORMAL
LYMPHOCYTES # BLD AUTO: 1.58 K/UL — SIGNIFICANT CHANGE UP (ref 1–3.3)
LYMPHOCYTES # BLD AUTO: 7.8 % — LOW (ref 13–44)
MANUAL SMEAR VERIFICATION: SIGNIFICANT CHANGE UP
MCHC RBC-ENTMCNC: 30.3 PG — SIGNIFICANT CHANGE UP (ref 27–34)
MCHC RBC-ENTMCNC: 30.3 PG — SIGNIFICANT CHANGE UP (ref 27–34)
MCHC RBC-ENTMCNC: 32.9 GM/DL — SIGNIFICANT CHANGE UP (ref 32–36)
MCHC RBC-ENTMCNC: 33 GM/DL — SIGNIFICANT CHANGE UP (ref 32–36)
MCV RBC AUTO: 91.9 FL — SIGNIFICANT CHANGE UP (ref 80–100)
MCV RBC AUTO: 92.1 FL — SIGNIFICANT CHANGE UP (ref 80–100)
MONOCYTES # BLD AUTO: 1.06 K/UL — HIGH (ref 0–0.9)
MONOCYTES NFR BLD AUTO: 5.2 % — SIGNIFICANT CHANGE UP (ref 2–14)
NEUTROPHILS # BLD AUTO: 17.66 K/UL — HIGH (ref 1.8–7.4)
NEUTROPHILS NFR BLD AUTO: 87 % — HIGH (ref 43–77)
NITRITE UR-MCNC: NEGATIVE — SIGNIFICANT CHANGE UP
NRBC # BLD: 0 /100 WBCS — SIGNIFICANT CHANGE UP (ref 0–0)
NRBC # FLD: 0 K/UL — SIGNIFICANT CHANGE UP (ref 0–0)
OB PNL STL: NEGATIVE — SIGNIFICANT CHANGE UP
PCO2 BLDV: 43 MMHG — SIGNIFICANT CHANGE UP (ref 42–55)
PH BLDV: 7.4 — SIGNIFICANT CHANGE UP (ref 7.32–7.43)
PH UR: 6.5 — SIGNIFICANT CHANGE UP (ref 5–8)
PLAT MORPH BLD: NORMAL — SIGNIFICANT CHANGE UP
PLATELET # BLD AUTO: 206 K/UL — SIGNIFICANT CHANGE UP (ref 150–400)
PLATELET # BLD AUTO: 244 K/UL — SIGNIFICANT CHANGE UP (ref 150–400)
PLATELET COUNT - ESTIMATE: NORMAL — SIGNIFICANT CHANGE UP
PO2 BLDV: 22 MMHG — SIGNIFICANT CHANGE UP
POTASSIUM BLDV-SCNC: 4.4 MMOL/L — SIGNIFICANT CHANGE UP (ref 3.5–5.1)
POTASSIUM SERPL-MCNC: 4.5 MMOL/L — SIGNIFICANT CHANGE UP (ref 3.5–5.3)
POTASSIUM SERPL-SCNC: 4.5 MMOL/L — SIGNIFICANT CHANGE UP (ref 3.5–5.3)
PROT SERPL-MCNC: 6.8 G/DL — SIGNIFICANT CHANGE UP (ref 6–8.3)
PROT UR-MCNC: ABNORMAL
PROTHROM AB SERPL-ACNC: 14.1 SEC — HIGH (ref 10.5–13.4)
RBC # BLD: 2.34 M/UL — LOW (ref 4.2–5.8)
RBC # BLD: 2.41 M/UL — LOW (ref 4.2–5.8)
RBC # FLD: 14.8 % — HIGH (ref 10.3–14.5)
RBC # FLD: 14.9 % — HIGH (ref 10.3–14.5)
RBC BLD AUTO: NORMAL — SIGNIFICANT CHANGE UP
RBC CASTS # UR COMP ASSIST: 4 /HPF — SIGNIFICANT CHANGE UP (ref 0–4)
RH IG SCN BLD-IMP: NEGATIVE — SIGNIFICANT CHANGE UP
RSV RNA NPH QL NAA+NON-PROBE: SIGNIFICANT CHANGE UP
SAO2 % BLDV: 26.1 % — SIGNIFICANT CHANGE UP
SARS-COV-2 RNA SPEC QL NAA+PROBE: SIGNIFICANT CHANGE UP
SODIUM SERPL-SCNC: 131 MMOL/L — LOW (ref 135–145)
SP GR SPEC: >1.05 (ref 1.01–1.05)
TROPONIN T, HIGH SENSITIVITY RESULT: 13 NG/L — SIGNIFICANT CHANGE UP
UROBILINOGEN FLD QL: SIGNIFICANT CHANGE UP
VIT B12 SERPL-MCNC: 465 PG/ML — SIGNIFICANT CHANGE UP (ref 200–900)
WBC # BLD: 20.3 K/UL — HIGH (ref 3.8–10.5)
WBC # BLD: 23.68 K/UL — HIGH (ref 3.8–10.5)
WBC # FLD AUTO: 20.3 K/UL — HIGH (ref 3.8–10.5)
WBC # FLD AUTO: 23.68 K/UL — HIGH (ref 3.8–10.5)
WBC UR QL: 135 /HPF — HIGH (ref 0–5)

## 2023-01-18 PROCEDURE — 99222 1ST HOSP IP/OBS MODERATE 55: CPT

## 2023-01-18 PROCEDURE — 73590 X-RAY EXAM OF LOWER LEG: CPT | Mod: 26,LT

## 2023-01-18 PROCEDURE — 73610 X-RAY EXAM OF ANKLE: CPT | Mod: 26,LT

## 2023-01-18 PROCEDURE — 71275 CT ANGIOGRAPHY CHEST: CPT | Mod: 26,MB

## 2023-01-18 PROCEDURE — 70450 CT HEAD/BRAIN W/O DYE: CPT | Mod: 26,MA

## 2023-01-18 PROCEDURE — 93971 EXTREMITY STUDY: CPT | Mod: 26,LT

## 2023-01-18 PROCEDURE — 99222 1ST HOSP IP/OBS MODERATE 55: CPT | Mod: GC

## 2023-01-18 PROCEDURE — 71045 X-RAY EXAM CHEST 1 VIEW: CPT | Mod: 26

## 2023-01-18 PROCEDURE — 27602 DECOMPRESSION OF LOWER LEG: CPT

## 2023-01-18 RX ORDER — HYDROMORPHONE HYDROCHLORIDE 2 MG/ML
1 INJECTION INTRAMUSCULAR; INTRAVENOUS; SUBCUTANEOUS
Refills: 0 | Status: DISCONTINUED | OUTPATIENT
Start: 2023-01-18 | End: 2023-01-18

## 2023-01-18 RX ORDER — HEPARIN SODIUM 5000 [USP'U]/ML
6500 INJECTION INTRAVENOUS; SUBCUTANEOUS EVERY 6 HOURS
Refills: 0 | Status: DISCONTINUED | OUTPATIENT
Start: 2023-01-18 | End: 2023-01-19

## 2023-01-18 RX ORDER — PIPERACILLIN AND TAZOBACTAM 4; .5 G/20ML; G/20ML
3.38 INJECTION, POWDER, LYOPHILIZED, FOR SOLUTION INTRAVENOUS EVERY 8 HOURS
Refills: 0 | Status: COMPLETED | OUTPATIENT
Start: 2023-01-19 | End: 2023-01-25

## 2023-01-18 RX ORDER — OXYCODONE HYDROCHLORIDE 5 MG/1
5 TABLET ORAL EVERY 6 HOURS
Refills: 0 | Status: DISCONTINUED | OUTPATIENT
Start: 2023-01-18 | End: 2023-01-25

## 2023-01-18 RX ORDER — HEPARIN SODIUM 5000 [USP'U]/ML
6500 INJECTION INTRAVENOUS; SUBCUTANEOUS ONCE
Refills: 0 | Status: COMPLETED | OUTPATIENT
Start: 2023-01-18 | End: 2023-01-18

## 2023-01-18 RX ORDER — PIPERACILLIN AND TAZOBACTAM 4; .5 G/20ML; G/20ML
3.38 INJECTION, POWDER, LYOPHILIZED, FOR SOLUTION INTRAVENOUS ONCE
Refills: 0 | Status: COMPLETED | OUTPATIENT
Start: 2023-01-18 | End: 2023-01-18

## 2023-01-18 RX ORDER — LEVETIRACETAM 250 MG/1
1500 TABLET, FILM COATED ORAL EVERY 12 HOURS
Refills: 0 | Status: DISCONTINUED | OUTPATIENT
Start: 2023-01-18 | End: 2023-02-06

## 2023-01-18 RX ORDER — ATORVASTATIN CALCIUM 80 MG/1
40 TABLET, FILM COATED ORAL AT BEDTIME
Refills: 0 | Status: DISCONTINUED | OUTPATIENT
Start: 2023-01-18 | End: 2023-01-22

## 2023-01-18 RX ORDER — SODIUM CHLORIDE 9 MG/ML
1000 INJECTION INTRAMUSCULAR; INTRAVENOUS; SUBCUTANEOUS
Refills: 0 | Status: DISCONTINUED | OUTPATIENT
Start: 2023-01-18 | End: 2023-01-20

## 2023-01-18 RX ORDER — HEPARIN SODIUM 5000 [USP'U]/ML
3000 INJECTION INTRAVENOUS; SUBCUTANEOUS EVERY 6 HOURS
Refills: 0 | Status: DISCONTINUED | OUTPATIENT
Start: 2023-01-18 | End: 2023-01-19

## 2023-01-18 RX ORDER — HEPARIN SODIUM 5000 [USP'U]/ML
6500 INJECTION INTRAVENOUS; SUBCUTANEOUS EVERY 6 HOURS
Refills: 0 | Status: DISCONTINUED | OUTPATIENT
Start: 2023-01-18 | End: 2023-01-18

## 2023-01-18 RX ORDER — PANTOPRAZOLE SODIUM 20 MG/1
40 TABLET, DELAYED RELEASE ORAL DAILY
Refills: 0 | Status: DISCONTINUED | OUTPATIENT
Start: 2023-01-18 | End: 2023-01-20

## 2023-01-18 RX ORDER — HEPARIN SODIUM 5000 [USP'U]/ML
3000 INJECTION INTRAVENOUS; SUBCUTANEOUS EVERY 6 HOURS
Refills: 0 | Status: DISCONTINUED | OUTPATIENT
Start: 2023-01-18 | End: 2023-01-18

## 2023-01-18 RX ORDER — PIPERACILLIN AND TAZOBACTAM 4; .5 G/20ML; G/20ML
3.38 INJECTION, POWDER, LYOPHILIZED, FOR SOLUTION INTRAVENOUS ONCE
Refills: 0 | Status: COMPLETED | OUTPATIENT
Start: 2023-01-18 | End: 2023-01-19

## 2023-01-18 RX ORDER — SODIUM CHLORIDE 9 MG/ML
1000 INJECTION INTRAMUSCULAR; INTRAVENOUS; SUBCUTANEOUS
Refills: 0 | Status: DISCONTINUED | OUTPATIENT
Start: 2023-01-18 | End: 2023-01-18

## 2023-01-18 RX ORDER — LACOSAMIDE 50 MG/1
100 TABLET ORAL
Refills: 0 | Status: DISCONTINUED | OUTPATIENT
Start: 2023-01-18 | End: 2023-01-18

## 2023-01-18 RX ORDER — HYDROMORPHONE HYDROCHLORIDE 2 MG/ML
0.5 INJECTION INTRAMUSCULAR; INTRAVENOUS; SUBCUTANEOUS
Refills: 0 | Status: DISCONTINUED | OUTPATIENT
Start: 2023-01-18 | End: 2023-01-18

## 2023-01-18 RX ORDER — LACOSAMIDE 50 MG/1
100 TABLET ORAL EVERY 12 HOURS
Refills: 0 | Status: DISCONTINUED | OUTPATIENT
Start: 2023-01-18 | End: 2023-02-06

## 2023-01-18 RX ORDER — LEVETIRACETAM 250 MG/1
1500 TABLET, FILM COATED ORAL
Refills: 0 | Status: DISCONTINUED | OUTPATIENT
Start: 2023-01-18 | End: 2023-01-18

## 2023-01-18 RX ORDER — HEPARIN SODIUM 5000 [USP'U]/ML
INJECTION INTRAVENOUS; SUBCUTANEOUS
Qty: 25000 | Refills: 0 | Status: DISCONTINUED | OUTPATIENT
Start: 2023-01-18 | End: 2023-01-18

## 2023-01-18 RX ORDER — ASPIRIN/CALCIUM CARB/MAGNESIUM 324 MG
81 TABLET ORAL DAILY
Refills: 0 | Status: DISCONTINUED | OUTPATIENT
Start: 2023-01-18 | End: 2023-02-06

## 2023-01-18 RX ORDER — ONDANSETRON 8 MG/1
4 TABLET, FILM COATED ORAL ONCE
Refills: 0 | Status: DISCONTINUED | OUTPATIENT
Start: 2023-01-18 | End: 2023-01-18

## 2023-01-18 RX ORDER — HEPARIN SODIUM 5000 [USP'U]/ML
INJECTION INTRAVENOUS; SUBCUTANEOUS
Qty: 25000 | Refills: 0 | Status: DISCONTINUED | OUTPATIENT
Start: 2023-01-18 | End: 2023-01-19

## 2023-01-18 RX ADMIN — HEPARIN SODIUM 1400 UNIT(S)/HR: 5000 INJECTION INTRAVENOUS; SUBCUTANEOUS at 05:12

## 2023-01-18 RX ADMIN — SODIUM CHLORIDE 75 MILLILITER(S): 9 INJECTION INTRAMUSCULAR; INTRAVENOUS; SUBCUTANEOUS at 11:21

## 2023-01-18 RX ADMIN — HEPARIN SODIUM 6500 UNIT(S): 5000 INJECTION INTRAVENOUS; SUBCUTANEOUS at 05:12

## 2023-01-18 RX ADMIN — SODIUM CHLORIDE 125 MILLILITER(S): 9 INJECTION INTRAMUSCULAR; INTRAVENOUS; SUBCUTANEOUS at 19:55

## 2023-01-18 RX ADMIN — LEVETIRACETAM 400 MILLIGRAM(S): 250 TABLET, FILM COATED ORAL at 23:31

## 2023-01-18 RX ADMIN — HEPARIN SODIUM 1400 UNIT(S)/HR: 5000 INJECTION INTRAVENOUS; SUBCUTANEOUS at 15:50

## 2023-01-18 RX ADMIN — LACOSAMIDE 120 MILLIGRAM(S): 50 TABLET ORAL at 22:53

## 2023-01-18 RX ADMIN — PANTOPRAZOLE SODIUM 40 MILLIGRAM(S): 20 TABLET, DELAYED RELEASE ORAL at 22:54

## 2023-01-18 RX ADMIN — PIPERACILLIN AND TAZOBACTAM 200 GRAM(S): 4; .5 INJECTION, POWDER, LYOPHILIZED, FOR SOLUTION INTRAVENOUS at 19:35

## 2023-01-18 RX ADMIN — Medication 81 MILLIGRAM(S): at 14:01

## 2023-01-18 NOTE — ED ADULT NURSE NOTE - SUICIDE SCREENING QUESTION 3
Discharge to [facility]   Soft food (patient preference and also dentition)   Physical therapy,   Occupational therapy (if needed)  Daily weights  Monitor to see if need assistance with feedings  F/u with PCP post-discharge from facility within 1 week   F/u with cardiologist (Dr. Valenzuela) 1-2 weeks post-discharge from facility  
Patient unable to complete

## 2023-01-18 NOTE — CONSULT NOTE ADULT - SUBJECTIVE AND OBJECTIVE BOX
Neurology consult    TAMI [id number]     Patient is a 52y old  Male who presents with a chief complaint of     HPI:  Patient is a 52-year-old male with a past medical history of CVA/TIA, ASD/PFO?, hyperlipidemia presents emerged department today with altered mental status from his nursing facility.  Patient just had a stay in this hospital when he was found unresponsive at home.  An extensive work-up with no significant etiology found.  There were thoughts that it was related to seizure activity however neurology did not find any seizure activity on their work-up.  Patient went to a nursing facility and rehab.  Was doing well until yesterday his parents noted him to be somewhat confused and having word finding difficulties.  Today his speech was worse when noticed by the wife.  He is also noted to have odd behavior where he was pulling at sheets and the strings of the masks.  He did not have this behavior before.  Only residual deficit from his previous strokes with some random word finding abilities however his speech today is reported to be significantly worse with worse word finding.  The patient denies any pain at this time.  He does acknowledge that he is having trouble speaking.  There is also report from the family and patient is complaining of left calf pain.  This pain is much more severe than it has been in the past. (18 Jan 2023 10:06)      Found to have a LLE DVT    MEDICATIONS    aspirin enteric coated 81 milliGRAM(s) Oral daily  atorvastatin 40 milliGRAM(s) Oral at bedtime  lacosamide 100 milliGRAM(s) Oral two times a day  levETIRAcetam 1500 milliGRAM(s) Oral two times a day  oxyCODONE    IR 5 milliGRAM(s) Oral every 6 hours PRN  sodium chloride 0.9%. 1000 milliLiter(s) IV Continuous <Continuous>      PMH: History of TIAs    CVA (cerebrovascular accident)    HLD (hyperlipidemia)    Vertigo    Unresponsiveness         PSH: No significant past surgical history        Family history: No history of dementia, strokes, or seizures   FAMILY HISTORY:      SOCIAL HISTORY:  No history of tobacco or alcohol use     Allergies    No Known Allergies    Intolerances        Height (cm): 190.5 (01-17 @ 21:44)  Weight (kg): 79.4 (01-18 @ 02:31)  BMI (kg/m2): 21.9 (01-18 @ 02:31)    Vital Signs Last 24 Hrs  T(C): 37.1 (18 Jan 2023 08:30), Max: 37.4 (17 Jan 2023 21:44)  T(F): 98.8 (18 Jan 2023 08:30), Max: 99.3 (17 Jan 2023 21:44)  HR: 107 (18 Jan 2023 08:30) (105 - 118)  BP: 135/86 (18 Jan 2023 08:30) (128/86 - 149/77)  BP(mean): --  RR: 17 (18 Jan 2023 08:30) (16 - 18)  SpO2: 100% (18 Jan 2023 08:30) (100% - 100%)    Parameters below as of 18 Jan 2023 08:30  Patient On (Oxygen Delivery Method): room air          On Neurological Examination:    Mental Status - Patient is alert, awake, oriented X3. echolalia psychomotor slowing difficulty with abstractions grasp reflex b/l    Cranial Nerves - PERRL, EOMI, VFF, V1-V3 intact, no gross facial asymmetry, tongue/uvula midline    Motor Exam -   uppers 4+/5 lowers distal atrophy on right left swollen tight coul d not assess    Sensory    Idecreased LLE  Coord: FTN intact bilaterally     Gait -  deferred                                                      LABS:  CBC Full  -  ( 18 Jan 2023 11:00 )  WBC Count : 23.68 K/uL  RBC Count : 2.34 M/uL  Hemoglobin : 7.1 g/dL  Hematocrit : 21.5 %  Platelet Count - Automated : 244 K/uL  Mean Cell Volume : 91.9 fL  Mean Cell Hemoglobin : 30.3 pg  Mean Cell Hemoglobin Concentration : 33.0 gm/dL  Auto Neutrophil # : x  Auto Lymphocyte # : x  Auto Monocyte # : x  Auto Eosinophil # : x  Auto Basophil # : x  Auto Neutrophil % : x  Auto Lymphocyte % : x  Auto Monocyte % : x  Auto Eosinophil % : x  Auto Basophil % : x    Urinalysis Basic - ( 18 Jan 2023 07:46 )    Color: Yellow / Appearance: Clear / SG: >1.050 / pH: x  Gluc: x / Ketone: Trace  / Bili: Negative / Urobili: <2 mg/dL   Blood: x / Protein: 30 mg/dL / Nitrite: Negative   Leuk Esterase: Moderate / RBC: 4 /HPF /  /HPF   Sq Epi: x / Non Sq Epi: 0 /HPF / Bacteria: Negative      01-18    131<L>  |  97<L>  |  15  ----------------------------<  143<H>  4.5   |  25  |  0.77    Ca    8.8      18 Jan 2023 00:47    TPro  6.8  /  Alb  3.1<L>  /  TBili  0.6  /  DBili  x   /  AST  91<H>  /  ALT  48<H>  /  AlkPhos  64  01-18    LIVER FUNCTIONS - ( 18 Jan 2023 00:47 )  Alb: 3.1 g/dL / Pro: 6.8 g/dL / ALK PHOS: 64 U/L / ALT: 48 U/L / AST: 91 U/L / GGT: x           Hemoglobin A1C:     Vitamin B12, Serum: 465 pg/mL (01-18 @ 11:00)    PT/INR - ( 18 Jan 2023 00:47 )   PT: 14.1 sec;   INR: 1.21 ratio         PTT - ( 18 Jan 2023 11:00 )  PTT:62.8 sec      RADIOLOGY  < from: MR Head w/wo IV Cont (12.29.22 @ 13:39) >  IMPRESSION:    Chronic infarcts of the right frontal subcortical and right centrum   semiovale. No new areas of infarct are identified. Unchanged scattered   foci of hemosiderin.    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   Differential diagnosis includes infection, sarcoidosis, lymphoma and   CLIPPERS (chronic lymphocytic inflammation with pontine perivascular   enhancement unresponsive to steroids).    --- End of Report ---    < end of copied text >  < from: MR Head w/wo IV Cont (12.29.22 @ 13:39) >  IMPRESSION:    Chronic infarcts of the right frontal subcortical and right centrum   semiovale. No new areas of infarct are identified. Unchanged scattered   foci of hemosiderin.    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   Differential diagnosis includes infection, sarcoidosis, lymphoma and   CLIPPERS (chronic lymphocytic inflammation with pontine perivascular   enhancement unresponsive to steroids).    --- End of Report ---    < end of copied text >

## 2023-01-18 NOTE — CONSULT NOTE ADULT - SUBJECTIVE AND OBJECTIVE BOX
Reason for consult: anemia, DVT, malignancy w/u    HPI:  Patient is a 52-year-old male with a past medical history of CVA/TIA, ASD/PFO?, hyperlipidemia presents emerged department today with altered mental status from his nursing facility.  Patient just had a stay in this hospital when he was found unresponsive at home.  An extensive work-up with no significant etiology found.  There were thoughts that it was related to seizure activity however neurology did not find any seizure activity on their work-up.  Patient went to a nursing facility and rehab.  Was doing well until yesterday his parents noted him to be somewhat confused and having word finding difficulties.  Today his speech was worse when noticed by the wife.  He is also noted to have odd behavior where he was pulling at sheets and the strings of the masks.  He did not have this behavior before.  Only residual deficit from his previous strokes with some random word finding abilities however his speech today is reported to be significantly worse with worse word finding.  The patient denies any pain at this time.  He does acknowledge that he is having trouble speaking.  There is also report from the family and patient is complaining of left calf pain.  This pain is much more severe than it has been in the past. (18 Jan 2023 10:06)    This is a 52 year old male recently admitted for AMS who had extensive workup with no definitive etiology found. He also had abnormal MRI findings concerning for malignancy, however lumbar puncture, CT c/a/p, colonoscopy w/ biopsy were overall unrevealing. He presents here with confusion and altered mental status, as well as worsening left lower extremity pain. Pt found with acute DVT despite being discharged on Lovenox last admission for DVT.   Pt was seen this afternoon in the ED. He appears more confused than during his last admission, but states he feels unwell and attributes this to lower extremity pain and swelling. Attempted to obtain further history regarding compliance with anticoagulation, however he continues on about his leg discomfort.     PAST MEDICAL & SURGICAL HISTORY:  History of TIAs      CVA (cerebrovascular accident)      HLD (hyperlipidemia)      Vertigo      Unresponsiveness      No significant past surgical history          FAMILY HISTORY:      Alochol: Denied  Smoking: Nonsmoker  Drug Use: Denied  Marital Status:         Allergies    No Known Allergies    Intolerances        MEDICATIONS  (STANDING):  aspirin enteric coated 81 milliGRAM(s) Oral daily  atorvastatin 40 milliGRAM(s) Oral at bedtime  lacosamide 100 milliGRAM(s) Oral two times a day  levETIRAcetam 1500 milliGRAM(s) Oral two times a day  sodium chloride 0.9%. 1000 milliLiter(s) (75 mL/Hr) IV Continuous <Continuous>    MEDICATIONS  (PRN):  oxyCODONE    IR 5 milliGRAM(s) Oral every 6 hours PRN Severe Pain (7 - 10)      ROS  +confusion   No fever, sweats, chills  No epistaxis, HA, sore throat  No CP, SOB, cough, sputum  No n/v/d, abd pain, melena, hematochezia  No edema  No rash  No anxiety  +LLE swelling and discomfort   No bleeding, bruising  No dysuria, hematuria    T(C): 37.1 (01-18-23 @ 14:28), Max: 37.4 (01-17-23 @ 21:44)  HR: 119 (01-18-23 @ 14:28) (105 - 119)  BP: 133/80 (01-18-23 @ 14:28) (128/86 - 149/77)  RR: 17 (01-18-23 @ 14:28) (16 - 18)  SpO2: 100% (01-18-23 @ 14:28) (100% - 100%)  Wt(kg): --    PE  NAD  Awake, alert, confused   Anicteric, MMM  RRR  CTAB  Abd soft, NT, ND  +LLE edema w/ ecchymosis noted to medial left ankle   No rash grossly  FROM                          7.1    23.68 )-----------( 244      ( 18 Jan 2023 11:00 )             21.5       01-18    131<L>  |  97<L>  |  15  ----------------------------<  143<H>  4.5   |  25  |  0.77    Ca    8.8      18 Jan 2023 00:47    TPro  6.8  /  Alb  3.1<L>  /  TBili  0.6  /  DBili  x   /  AST  91<H>  /  ALT  48<H>  /  AlkPhos  64  01-18   Reason for consult: anemia, DVT, malignancy w/u    HPI:  Patient is a 52-year-old male with a past medical history of CVA/TIA, ASD/PFO?, hyperlipidemia presents emerged department today with altered mental status from his nursing facility.  Patient just had a stay in this hospital when he was found unresponsive at home.  An extensive work-up with no significant etiology found.  There were thoughts that it was related to seizure activity however neurology did not find any seizure activity on their work-up.  Patient went to a nursing facility and rehab.  Was doing well until yesterday his parents noted him to be somewhat confused and having word finding difficulties.  Today his speech was worse when noticed by the wife.  He is also noted to have odd behavior where he was pulling at sheets and the strings of the masks.  He did not have this behavior before.  Only residual deficit from his previous strokes with some random word finding abilities however his speech today is reported to be significantly worse with worse word finding.  The patient denies any pain at this time.  He does acknowledge that he is having trouble speaking.  There is also report from the family and patient is complaining of left calf pain.  This pain is much more severe than it has been in the past. (18 Jan 2023 10:06)    This is a 52 year old male recently admitted for AMS who had extensive workup with no definitive etiology found. He also had abnormal MRI findings concerning for malignancy, however lumbar puncture, CT c/a/p, colonoscopy w/ biopsy were overall unrevealing. He presents here with confusion and altered mental status, as well as worsening left lower extremity pain. Pt found with acute DVT despite being discharged on Lovenox last admission for DVT.   Pt was seen this afternoon in the ED. He appears more confused than during his last admission, but states he feels unwell and attributes this to lower extremity pain and swelling. Attempted to obtain further history regarding compliance with anticoagulation, however he continues on about his leg discomfort.     PAST MEDICAL & SURGICAL HISTORY:  History of TIAs      CVA (cerebrovascular accident)      HLD (hyperlipidemia)      Vertigo      Unresponsiveness      No significant past surgical history          FAMILY HISTORY:      Alochol: Denied  Smoking: Nonsmoker  Drug Use: Denied  Marital Status:         Allergies    No Known Allergies    Intolerances        MEDICATIONS  (STANDING):  aspirin enteric coated 81 milliGRAM(s) Oral daily  atorvastatin 40 milliGRAM(s) Oral at bedtime  lacosamide 100 milliGRAM(s) Oral two times a day  levETIRAcetam 1500 milliGRAM(s) Oral two times a day  sodium chloride 0.9%. 1000 milliLiter(s) (75 mL/Hr) IV Continuous <Continuous>    MEDICATIONS  (PRN):  oxyCODONE    IR 5 milliGRAM(s) Oral every 6 hours PRN Severe Pain (7 - 10)      ROS  +confusion   No fever, sweats, chills  No epistaxis, HA, sore throat  No CP, SOB, cough, sputum  No n/v/d, abd pain, melena, hematochezia  No edema  No rash  No anxiety  +LLE swelling and discomfort   No bleeding, bruising  No dysuria, hematuria    T(C): 37.1 (01-18-23 @ 14:28), Max: 37.4 (01-17-23 @ 21:44)  HR: 119 (01-18-23 @ 14:28) (105 - 119)  BP: 133/80 (01-18-23 @ 14:28) (128/86 - 149/77)  RR: 17 (01-18-23 @ 14:28) (16 - 18)  SpO2: 100% (01-18-23 @ 14:28) (100% - 100%)  Wt(kg): --    PE  NAD  Awake, alert, confused   Anicteric, MMM  RRR  CTAB  Abd soft, NT, ND  +LLE edema w/ ecchymosis noted to medial left ankle   No rash grossly                          7.1    23.68 )-----------( 244      ( 18 Jan 2023 11:00 )             21.5       01-18    131<L>  |  97<L>  |  15  ----------------------------<  143<H>  4.5   |  25  |  0.77    Ca    8.8      18 Jan 2023 00:47    TPro  6.8  /  Alb  3.1<L>  /  TBili  0.6  /  DBili  x   /  AST  91<H>  /  ALT  48<H>  /  AlkPhos  64  01-18      ACC: [id number] EXAM:  US DPLX LWR EXT VEINS LTD LT                          PROCEDURE DATE:  01/18/2023          INTERPRETATION:  CLINICAL INFORMATION: Left lower extremity swelling    COMPARISON: Ultrasound lower extremity 11/5/2022    TECHNIQUE: Duplex sonography of the LEFT LOWER extremity veins with color   and spectral Doppler, with and without compression.    FINDINGS:  Left common femoral vein and femoral vein are patent and compressible.   Deep venous thrombosis in the left popliteal vein which is   noncompressible, as well as in the left posterior tibial vein and   gastrocnemius vein. Poor spectral Doppler flow within the thrombosed   segments.  Subcutaneous edema.    The contralateral common femoral vein is unremarkable.    IMPRESSION:  Acute deep venous thrombosis in the left popliteal vein, left posterior   tibial vein, and left gastrocnemius vein..    Findings were discussed by Dr. Danielle with  MD Marvin on 1/18/2023 1:34   AM with read back confirmation.    --- End of Report ---

## 2023-01-18 NOTE — ED ADULT NURSE REASSESSMENT NOTE - NS ED NURSE REASSESS COMMENT FT1
Pt appears to be resting comfortably, NAD, respirations are even and unlabored, no complaints at this moment, Safety precautions implemented as per protocol, awaiting further MD orders, will continue with plan of care.

## 2023-01-18 NOTE — CONSULT NOTE ADULT - SUBJECTIVE AND OBJECTIVE BOX
VASCULAR SURGERY CONSULT NOTE  --------------------------------------------------------------------------------------------    Patient is a 52y old  Male who presents with a chief complaint of AMS (18 Jan 2023 13:18)      HPI: Mr. Caceres is 52 M w hx of multiple CVA in last year with residual deficit of word finding difficulty, recent admission for unresponsiveness [facility] seizure activity @ OSH, who presents with AMS today from nursing facility, found to have acute onset LLE DVT L pop v, L PT v, and L gastroc v with severe swelling concerning for compartment syndrome. Vascular Surgery consulted to r/o compartment syndrome.     Patient seen and examined. Awake and alert but with significant word finding difficulty. History limited by patient's speech. Spoke to Parents and close friend who report patient has no motor or sensory deficits from prior CVAs.       PAST MEDICAL & SURGICAL HISTORY:  History of TIAs      CVA (cerebrovascular accident)      HLD (hyperlipidemia)      Vertigo      Unresponsiveness      No significant past surgical history        FAMILY HISTORY:    [] Family history not pertinent as reviewed with the patient and family    ALLERGIES: No Known Allergies    CURRENT MEDICATIONS  MEDICATIONS (STANDING): aspirin enteric coated 81 milliGRAM(s) Oral daily  atorvastatin 40 milliGRAM(s) Oral at bedtime  heparin  Infusion.  Unit(s)/Hr IV Continuous <Continuous>  lacosamide 100 milliGRAM(s) Oral two times a day  levETIRAcetam 1500 milliGRAM(s) Oral two times a day  pantoprazole  Injectable 40 milliGRAM(s) IV Push daily  piperacillin/tazobactam IVPB.- 3.375 Gram(s) IV Intermittent once  sodium chloride 0.9%. 1000 milliLiter(s) IV Continuous <Continuous>    MEDICATIONS (PRN):heparin   Injectable 6500 Unit(s) IV Push every 6 hours PRN For aPTT less than 40  heparin   Injectable 3000 Unit(s) IV Push every 6 hours PRN For aPTT between 40 - 57  HYDROmorphone  Injectable 0.5 milliGRAM(s) IV Push every 10 minutes PRN Moderate Pain (4 - 6)  HYDROmorphone  Injectable 1 milliGRAM(s) IV Push every 10 minutes PRN Severe Pain (7 - 10)  ondansetron Injectable 4 milliGRAM(s) IV Push once PRN Nausea and/or Vomiting  oxyCODONE    IR 5 milliGRAM(s) Oral every 6 hours PRN Severe Pain (7 - 10)    --------------------------------------------------------------------------------------------    Vitals:   T(C): 36.6 (01-18-23 @ 20:00), Max: 37.4 (01-17-23 @ 21:44)  HR: 97 (01-18-23 @ 20:30) (97 - 119)  BP: 108/74 (01-18-23 @ 20:30) (107/74 - 149/77)  RR: 15 (01-18-23 @ 20:30) (14 - 18)  SpO2: 97% (01-18-23 @ 20:30) (96% - 100%)  CAPILLARY BLOOD GLUCOSE      POCT Blood Glucose.: 140 mg/dL (18 Jan 2023 09:02)  POCT Blood Glucose.: 129 mg/dL (17 Jan 2023 22:11)    CAPILLARY BLOOD GLUCOSE      POCT Blood Glucose.: 140 mg/dL (18 Jan 2023 09:02)  POCT Blood Glucose.: 129 mg/dL (17 Jan 2023 22:11)      01-18 @ 07:01 - 01-18 @ 20:51  --------------------------------------------------------  IN:    sodium chloride 0.9%: 125 mL  Total IN: 125 mL    OUT:    Heparin Infusion: 0 mL  Total OUT: 0 mL    Total NET: 125 mL        Height (cm): 190.5 (01-18 @ 16:07)  Weight (kg): 79.4 (01-18 @ 16:07)  BMI (kg/m2): 21.9 (01-18 @ 16:07)  BSA (m2): 2.07 (01-18 @ 16:07)    PHYSICAL EXAM:   General: Alert, NAD  Neuro: A+Ox1, word finding difficulty   HEENT: NC/AT, no asymmetry, no scleral icterus  Neck: Soft, supple  Cardio: RRR  Resp: Airway patent, unlabored breathing  Thorax: No chest wall tenderness  GI/Abd: Soft, NT/ND, no rebound/guarding, no masses palpated  Vascular: All 4 extremities warm, RLE wnl w palpable pulses throughout, LLE with significant swelling in lower leg, tense and severely tender to palpation, pain with passive dorsiflexion, toes wiggle, PT/DP signals, Pop signals, and palpable femoral pulse, + L foot drop  Skin: Intact, no breakdown  --------------------------------------------------------------------------------------------    LABS  CBC (01-18 @ 11:00)                              7.1<L>                         23.68<H>  )----------------(  244        --    % Neutrophils, --    % Lymphocytes, ANC: --                                  21.5<L>  CBC (01-18 @ 00:47)                              7.3<L>                         20.30<H>  )----------------(  206        87.0<H>% Neutrophils, 7.8<L>% Lymphocytes, ANC: 17.66<H>                              22.2<L>    BMP (01-18 @ 00:47)             131<L>  |  97<L>   |  15    		Ca++ --      Ca 8.8                ---------------------------------( 143<H>		Mg --                 4.5     |  25      |  0.77  			Ph --        LFTs (01-18 @ 00:47)      TPro 6.8 / Alb 3.1<L> / TBili 0.6 / DBili -- / AST 91<H> / ALT 48<H> / AlkPhos 64    Coags (01-18 @ 11:00)  aPTT 62.8<H> / INR -- / PT --  Coags (01-18 @ 00:47)  aPTT 33.1 / INR 1.21<H> / PT 14.1<H>        VBG (01-18 @ 00:47)     7.40 / 43 / 22 / 27 / 1.6 / 26.1%     Lactate: 1.4    --------------------------------------------------------------------------------------------    MICROBIOLOGY  Urinalysis (01-18 @ 07:46):     Color: Yellow / Appearance: Clear / SG: >1.050<!> / pH: 6.5 / Gluc: Negative / Ketones: Trace<!> / Bili: Negative / Urobili: <2 mg/dL / Protein :30 mg/dL<!> / Nitrites: Negative / Leuk.Est: Moderate<!> / RBC: 4 / WBC: 135<H> / Sq Epi:  / Non Sq Epi: 0 / Bacteria Negative         --------------------------------------------------------------------------------------------    IMAGING  < from: US Duplex Venous Lower Ext Ltd, Left (01.18.23 @ 01:27) >    ACC: [id number] EXAM:  US DPLX LWR EXT VEINS LTD LT                          PROCEDURE DATE:  01/18/2023          INTERPRETATION:  CLINICAL INFORMATION: Left lower extremity swelling    COMPARISON: Ultrasound lower extremity 11/5/2022    TECHNIQUE: Duplex sonography of the LEFT LOWER extremity veins with color   and spectral Doppler, with and without compression.    FINDINGS:  Left common femoral vein and femoral vein are patent and compressible.   Deep venous thrombosis in the left popliteal vein which is   noncompressible, as well as in the left posterior tibial vein and   gastrocnemius vein. Poor spectral Doppler flow within the thrombosed   segments.  Subcutaneous edema.    The contralateral common femoral vein is unremarkable.    IMPRESSION:  Acute deep venous thrombosis in the left popliteal vein, left posterior   tibial vein, and left gastrocnemius vein..    Findings were discussed by Dr. Maurer with  MD Marvin on 1/18/2023 1:34   AM with read back confirmation.    --- End of Report ---          BRIAN MAURER MD; Resident Radiologist  This document has been electronically signed.  KARSON MANZANO DO; Attending Radiologist  This document has been electronically signed. Jan 18 2023  1:56AM    < end of copied text >

## 2023-01-18 NOTE — ED PROVIDER NOTE - PHYSICAL EXAMINATION
Vitals: I have reviewed the patients vital signs  General: nontoxic appearing  HEENT: Atraumatic, normocephalic, airway patent  Eyes: EOMI, tracking appropriately  Neck: no tracheal deviation  Chest/Lungs: no trauma, symmetric chest rise, speaking in complete sentences,  no resp distress  Heart: skin and extremities well perfused, regular rate and rhythm  Neuro: A+Ox2, appears non focal with word finding difficulties.  Is pulling at his mask cords.    MSK: no deformities there is signifcant swelling and TTP of the L calf region and L ankle.  Palpable pulses.    Skin: no cyanosis, no jaundice   Psych:  Normal mood and affect

## 2023-01-18 NOTE — ED PROVIDER NOTE - OBJECTIVE STATEMENT
52-year-old male with a past medical history of CVA is hyperlipidemia presents emerged department today with altered mental status from his nursing facility.  Patient just had a stay in this hospital when he was found unresponsive at home.  An extensive work-up with no significant etiology found.  There were thoughts that it was related to seizure activity however neurology did not find any seizure activity on their work-up.  Patient went to a nursing facility and rehab.  Was doing well until yesterday his parents noted him to be somewhat confused and having word finding difficulties.  Today his speech was worse when noticed by the wife.  He is also noted to have odd behavior where he was pulling at sheets and the strings of the masks.  He did not have this behavior before.  Only residual deficit from his previous strokes with some random word finding abilities however his speech today is reported to be significantly worse with worse word finding.  The patient denies any pain at this time.  He does acknowledge that he is having trouble speaking.  There is also report from the family and patient is complaining of left calf pain.  This pain is much more severe than it has been in the past.

## 2023-01-18 NOTE — H&P ADULT - ASSESSMENT
Patient is a 52-year-old male with a past medical history of CVA/TIA, ASD/PFO?, hyperlipidemia presents emerged department today with altered mental status from his nursing facility.  Patient just had a stay in this hospital when he was found unresponsive at home.  An extensive work-up with no significant etiology found.  There were thoughts that it was related to seizure activity however neurology did not find any seizure activity on their work-up.  Patient went to a nursing facility and rehab.  Was doing well until yesterday his parents noted him to be somewhat confused and having word finding difficulties.  Today his speech was worse when noticed by the wife.  He is also noted to have odd behavior where he was pulling at sheets and the strings of the masks.  He did not have this behavior before.  Only residual deficit from his previous strokes with some random word finding abilities however his speech today is reported to be significantly worse with worse word finding.  The patient denies any pain at this time.  He does acknowledge that he is having trouble speaking.  There is also report from the family and patient is complaining of left calf pain.  This pain is much more severe than it has been in the past.    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, S?P LP, no malignancy on cyto     # Lekucytosis  Pan CX   Infectious W/U   CTA negative   ID eval called     # L Knee swelling   R/O compartment syndrome   DVT study positive on US   Vascular eval     # ANemia  Occult negative  on lovenox   Anemia W/U      Patient is a 52-year-old male with a past medical history of CVA/TIA, ASD/PFO?, hyperlipidemia presents emerged department today with altered mental status from his nursing facility.  Patient just had a stay in this hospital when he was found unresponsive at home.  An extensive work-up with no significant etiology found.  There were thoughts that it was related to seizure activity however neurology did not find any seizure activity on their work-up.  Patient went to a nursing facility and rehab.  Was doing well until yesterday his parents noted him to be somewhat confused and having word finding difficulties.  Today his speech was worse when noticed by the wife.  He is also noted to have odd behavior where he was pulling at sheets and the strings of the masks.  He did not have this behavior before.  Only residual deficit from his previous strokes with some random word finding abilities however his speech today is reported to be significantly worse with worse word finding.  The patient denies any pain at this time.  He does acknowledge that he is having trouble speaking.  There is also report from the family and patient is complaining of left calf pain.  This pain is much more severe than it has been in the past.    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, S?P LP, no malignancy on cyto   tele monitoring     # Lekucytosis  Pan CX   Infectious W/U   CTA negative   ID eval called     # L Knee/LE  swelling   R/O compartment syndrome   DVT study positive on US   Vascular eval called       # ANemia  Occult negative  on lovenox   Anemia W/U

## 2023-01-18 NOTE — CONSULT NOTE ADULT - SUBJECTIVE AND OBJECTIVE BOX
HPI:  Patient is a 52-year-old male with a past medical history of   CVA/TIA  ASD/PFO?  hyperlipidemia     Recent admission for change in mental status. During that admission, was noted to have leukocytosis.   Imaging showed ? leptomeningeal disease and some non specific lymphadenopathy   Eventually, discharged to SNF.    He presents emerged department yestrday with altered mental status from his nursing facility.   Was doing well until yesterday his parents noted him to be somewhat confused and having word finding difficulties.  Today his speech was worse when noticed by the wife.  He is also noted to have odd behavior where he was pulling at sheets and the strings of the masks.  He did not have this behavior before.  Only residual deficit from his previous strokes with some random word finding abilities however his speech today is reported to be significantly worse with worse word finding.      He complains of pain to his left leg      PAST MEDICAL & SURGICAL HISTORY:    History of TIAs  CVA (cerebrovascular accident)  HLD (hyperlipidemia)  Vertigo  Unresponsiveness      Allergies    No Known Allergies    Intolerances        ANTIMICROBIALS:      OTHER MEDS:  aspirin enteric coated 81 milliGRAM(s) Oral daily  atorvastatin 40 milliGRAM(s) Oral at bedtime  lacosamide 100 milliGRAM(s) Oral two times a day  levETIRAcetam 1500 milliGRAM(s) Oral two times a day  oxyCODONE    IR 5 milliGRAM(s) Oral every 6 hours PRN  sodium chloride 0.9%. 1000 milliLiter(s) IV Continuous <Continuous>      SOCIAL HISTORY:  No travel  no tobacco       FAMILY HISTORY:  pt denies significant history in first degree relatives    REVIEW OF SYSTEMS  [  ] ROS unobtainable because:    [ x ] All other systems negative except as noted below:	    Constitutional:  [ ] fever [ ] chills  [ ] weight loss  [ x] weakness  Skin:  [ ] rash [ ] phlebitis	  Eyes: [ ] icterus [ ] pain  [ ] discharge	  ENMT: [ ] sore throat  [ ] thrush [ ] ulcers [ ] exudates  Respiratory: [ ] dyspnea [ ] hemoptysis [ ] cough [ ] sputum	  Cardiovascular:  [ ] chest pain [ ] palpitations [ ] edema	  Gastrointestinal:  [ ] nausea [ ] vomiting [ ] diarrhea [ ] constipation [ ] pain	  Genitourinary:  [ ] dysuria [ ] frequency [ ] hematuria [ ] discharge [ ] flank pain  [ ] incontinence  Musculoskeletal:  [ ] myalgias [ ] arthralgias [ ] arthritis  [x ] leg pain  Neurological:  [ ] headache [ ] seizures  [ ] confusion/altered mental status  Psychiatric:  [ ] anxiety [ ] depression	  Hematology/Lymphatics:  [ ] lymphadenopathy  Endocrine:  [ ] adrenal [ ] thyroid  Allergic/Immunologic:	 [ ] transplant [ ] seasonal    PHYSICAL EXAM:  General: [x ] non-toxic  HEAD/EYES: [ ] PERRL [ x] white sclera [ ] icterus  ENT:  [ ] normal [x ] supple [ ] thrush [ ] pharyngeal exudate  Cardiovascular:   [ ] murmur [x ] normal [ ] PPM/AICD  Respiratory:  [ x] clear to ausculation bilaterally  GI:  [x ] soft, non-tender, normal bowel sounds  :  [ ] fish [ ] no CVA tenderness   Musculoskeletxal:  [ ] no synovitis  Neurologic:  [ ] non-focal exam   Skin:  [x ] ,left calf swoolen, tneder to palpation, brusing around the ankle    Lymph: [x ] no lymphadenopathy  Psychiatric:  [ ] appropriate affect [ ] alert & oriented  Lines:  [x ] no phlebitis [ ] central line          Drug Dosing Weight  Height (cm): 190.5 (17 Jan 2023 21:44)  Weight (kg): 79.4 (18 Jan 2023 02:31)  BMI (kg/m2): 21.9 (18 Jan 2023 02:31)  BSA (m2): 2.07 (18 Jan 2023 02:31)    Vital Signs Last 24 Hrs  T(F): 98.8 (01-18-23 @ 08:30), Max: 99.3 (01-17-23 @ 21:44)    Vital Signs Last 24 Hrs  HR: 107 (01-18-23 @ 08:30) (105 - 118)  BP: 135/86 (01-18-23 @ 08:30) (128/86 - 149/77)  RR: 17 (01-18-23 @ 08:30)  SpO2: 100% (01-18-23 @ 08:30) (100% - 100%)  Wt(kg): --                          7.3    20.30 )-----------( 206      ( 18 Jan 2023 00:47 )             22.2       01-18    131<L>  |  97<L>  |  15  ----------------------------<  143<H>  4.5   |  25  |  0.77    Ca    8.8      18 Jan 2023 00:47    TPro  6.8  /  Alb  3.1<L>  /  TBili  0.6  /  DBili  x   /  AST  91<H>  /  ALT  48<H>  /  AlkPhos  64  01-18      Urinalysis Basic - ( 18 Jan 2023 07:46 )    Color: Yellow / Appearance: Clear / SG: >1.050 / pH: x  Gluc: x / Ketone: Trace  / Bili: Negative / Urobili: <2 mg/dL   Blood: x / Protein: 30 mg/dL / Nitrite: Negative   Leuk Esterase: Moderate / RBC: 4 /HPF /  /HPF   Sq Epi: x / Non Sq Epi: 0 /HPF / Bacteria: Negative        MICROBIOLOGY:    RADIOLOGY:    < from: MR Head w/wo IV Cont (12.29.22 @ 13:39) >    IMPRESSION:    Chronic infarcts of the right frontal subcortical and right centrum   semiovale. No new areas of infarct are identified. Unchanged scattered   foci of hemosiderin.    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   Differential diagnosis includes infection, sarcoidosis, lymphoma and   CLIPPERS (chronic lymphocytic inflammation with pontine perivascular   enhancement unresponsive to steroids).    < end of copied text >    < from: CT Angio Chest PE Protocol w/ IV Cont (01.18.23 @ 04:47) >  IMPRESSION:  Suboptimal opacification of the pulmonary arteries, significantly limited   to assess for pulmonary embolism. No evidence of pulmonary embolus within   the main or left/right main pulmonary arteries.    < end of copied text >

## 2023-01-18 NOTE — ED PROVIDER NOTE - NSICDXPASTMEDICALHX_GEN_ALL_CORE_FT
PAST MEDICAL HISTORY:  CVA (cerebrovascular accident)     History of TIAs     HLD (hyperlipidemia)     Unresponsiveness     Vertigo

## 2023-01-18 NOTE — ED ADULT NURSE REASSESSMENT NOTE - NS ED NURSE REASSESS COMMENT FT1
Break RN- Pt awake and alert, A&OX3 with periods of confusion, resting comfortably. Taken to OR at this time. Denies CP, SOB, HA, N/V, palpitations, fatigue. Resp even and unlabored. NAD

## 2023-01-18 NOTE — CHART NOTE - NSCHARTNOTEFT_GEN_A_CORE
Full Consult Note to follow    Briefly, 52M hx of CVA p/w altered mental status from his nursing facility.  Non-specific symptoms. Difficulty with word finding/speech, and seems to not be oriented to self. Also, labs showed anemia concerning for GI bleed. Patient also had RLE swelling with dUS showing popliteal vein to calf DVT.    On assessment by vascular team however, RLE is much more tense than LLE. Patient has firm calf that elicits exquisite pain on palpation. Calf is hardly compressible. There is pain with passive motion at the ankle. Also, patient has foot drop. With the patient having AMS, difficult to assess timing of left foot drop. However, patient exam is concerning for compartment syndrome. No discoloration, palpable pulses, but patient has severe swelling and tightness of lower extremity. Patient will be going to the OR for emergent fasciotomies. OF note, poor prognosis of recovery of left foot function - unknown duration of symptoms and prolonged nerve injury. Full Consult Note to follow    Briefly, 52M hx of CVA p/w altered mental status from his nursing facility.  Non-specific symptoms. Difficulty with word finding/speech, and seems to not be oriented to self. Also, labs showed anemia concerning for GI bleed. Patient also had RLE swelling with dUS showing popliteal vein to calf DVT.    On assessment by vascular team however, RLE is much more tense than LLE. Patient has firm calf that elicits exquisite pain on palpation. Calf is hardly compressible. There is pain with passive motion at the ankle. Also, patient has foot drop. With the patient having AMS, difficult to assess timing of left foot drop. However, patient exam is concerning for compartment syndrome. No discoloration, palpable pulses, but patient has severe swelling and tightness of lower extremity. Patient will be going to the OR for emergent fasciotomies. OF note, poor prognosis of recovery of left foot function - unknown duration of symptoms and prolonged nerve injury.      Attending Note:    pt seen and examined  left leg compartment syndrome   we performed emergent fasciotomy  left foot drop noted   risks including but not limited to bleeding, limb loss, permanent nerve injury, renal failure, PE, stroke, life threatening complications and death were discussed with the patient parents  they wish to proceed

## 2023-01-18 NOTE — CONSULT NOTE ADULT - SUBJECTIVE AND OBJECTIVE BOX
hPI:  Estiven Simeon is a 52-year-old  male presented with PMHx of TIAs (2011, 2013), CVAs x3 (2/2022, 8/3/2022, 11/5/2022) w/residual expressive aphasia on Eliquis, dyslipidemia and questionable ASD/PFO with a recent prolonged admission as outlined below representing to the hospital with slurred speech and AMS. GI consulted for further workup of occult anemia.    Patient recently had a very prolonged admission for AMS of unclear etiology. During that admission, he was intubated in the ED and monitored in MICU. Pt was weaned off pressors, extubated, and transitioned to floors 12/4. 12/5 am RRT called for seizures, again intubated then extubated 12/8. Hospital course was c/b partial SBO on CT now resolved in addition to sepsis of unclear etiology although treated with a course of Zosyn for presumed asp PNA. For workup of his AMS, he underwent EEG without any seizure activity. MRI was notable for  leptomeningeal disease, bilateral lentiform nucleus and pontine enhancement. He underwent repeated LPs with cytology negative fo malignant cells. He also also noted to have thickening vs. underdistention of rectum on CT AP. He underwent c-scope on 1/6 with diverticulosis in the sigmoid colon, non-bleeding internal hemorrhoid in addition to one15 mm benign-appearing (ie adenomatous rather malignant) polyp in the mid sigmoid colon with bx showing tubular adenoma with polypectomy was not attempted given need for heparin drip for DVT. There were NO endoscopic findings to account for the above .IR was consult for core needle biopsy of LN given no window to Bx 1/10. He was discharged to a rehab with overall improved metnal status.     He represents to the hospital with AMs and slurred speech. He  was doing well until yesterday his parents noted him to be somewhat confused and having word finding difficulties with some odd behavior where he was pulling at sheets and the strings of the masks.  He did not have this behavior before.  Only residual deficit from his previous strokes with some random word finding abilities however his speech today is reported to be significantly worse with worse word finding. Workup in the ED notable for labs with a CBC of 7 from recent d/c of 13with BUN/Cr ratio <30. CT of the head without any acute findings and CT chest with no underlying PE. Exam notable for very distended LLE although unclear how different compared to last time. Subjective somewhat limited from pt although denies any episodes of   melena, hematochezia or hematemesis Regarding medications, patient is currently on ASA and Enoxaparin for DVT. Above hisotry confirmed with facility. Denies any ongoing n/v/abdominal pain although does note a lot of LLE pain.     Allergies:  No Known Allergies    Home Medications:  aspirin 81 mg oral delayed release tablet: 1 tab(s) orally once a day (02 Dec 2022 23:10)  atorvastatin 40 mg oral tablet: 1 tab(s) orally once a day (02 Dec 2022 23:46)  bisacodyl 10 mg rectal suppository: 1 suppository(ies) rectal once a day (11 Jan 2023 12:52)  enoxaphrin: 100 milligram(s) subcutaneous 2 times a day (11 Jan 2023 12:52)  Keppra: 1500 milligram(s) orally 2 times a day (11 Jan 2023 12:52)  lidocaine 4% topical film: Apply topically to affected area once a day (11 Jan 2023 12:52)  Multiple Vitamins oral tablet: 1 tab(s) orally once a day (11 Jan 2023 12:52)  oxyCODONE 5 mg oral tablet: 1 tab(s) orally every 6 hours, As needed, moderate and severe pain (11 Jan 2023 12:52)  traMADol 50 mg oral tablet: 0.5 tab(s) orally every 6 hours, As needed, Moderate Pain (4 - 6) (11 Jan 2023 12:52)  Vimpat 100 mg oral tablet: 1 tab(s) orally 2 times a day (11 Jan 2023 12:52)      Hospital Medications:  aspirin enteric coated 81 milliGRAM(s) Oral daily  atorvastatin 40 milliGRAM(s) Oral at bedtime  lacosamide 100 milliGRAM(s) Oral two times a day  levETIRAcetam 1500 milliGRAM(s) Oral two times a day  oxyCODONE    IR 5 milliGRAM(s) Oral every 6 hours PRN  sodium chloride 0.9%. 1000 milliLiter(s) IV Continuous <Continuous>      PMHX/PSHX:    History of TIAs  CVA (cerebrovascular accident)  HLD (hyperlipidemia)  Vertigo  Unresponsiveness      Family history: No fam hx of CRC     Social History:   Tob: Denies  EtOH: Denies  Illicit Drugs: Denies    ROS: Complete and normal except as mentioned above    PHYSICAL EXAM:   GENERAL:  No acute distress  HEENT:  NCAT, no scleral icterus   CHEST:  no respiratory distress  HEART:  Regular rate and rhythm  ABDOMEN:  Soft, non-tender, non-distended, normoactive bowel sounds. Hematoma over the RLQ. Rectal exam with no stool in the rectal vault.   EXTREMITIES: 3-4+ LLE Edema   NEURO:  Alert and oriented x1, slurred speech     Vital Signs:  Vital Signs Last 24 Hrs  T(C): 37.1 (18 Jan 2023 08:30), Max: 37.4 (17 Jan 2023 21:44)  T(F): 98.8 (18 Jan 2023 08:30), Max: 99.3 (17 Jan 2023 21:44)  HR: 107 (18 Jan 2023 08:30) (105 - 118)  BP: 135/86 (18 Jan 2023 08:30) (128/86 - 149/77)  BP(mean): --  RR: 17 (18 Jan 2023 08:30) (16 - 18)  SpO2: 100% (18 Jan 2023 08:30) (100% - 100%)    Parameters below as of 18 Jan 2023 08:30  Patient On (Oxygen Delivery Method): room air      Daily Height in cm: 190.5 (17 Jan 2023 21:44)    Daily     LABS:                        7.1    23.68 )-----------( 244      ( 18 Jan 2023 11:00 )             21.5     Mean Cell Volume: 91.9 fL (01-18-23 @ 11:00)    01-18    131<L>  |  97<L>  |  15  ----------------------------<  143<H>  4.5   |  25  |  0.77    Ca    8.8      18 Jan 2023 00:47    TPro  6.8  /  Alb  3.1<L>  /  TBili  0.6  /  DBili  x   /  AST  91<H>  /  ALT  48<H>  /  AlkPhos  64  01-18    LIVER FUNCTIONS - ( 18 Jan 2023 00:47 )  Alb: 3.1 g/dL / Pro: 6.8 g/dL / ALK PHOS: 64 U/L / ALT: 48 U/L / AST: 91 U/L / GGT: x           PT/INR - ( 18 Jan 2023 00:47 )   PT: 14.1 sec;   INR: 1.21 ratio         PTT - ( 18 Jan 2023 11:00 )  PTT:62.8 sec  Urinalysis Basic - ( 18 Jan 2023 07:46 )    Color: Yellow / Appearance: Clear / SG: >1.050 / pH: x  Gluc: x / Ketone: Trace  / Bili: Negative / Urobili: <2 mg/dL   Blood: x / Protein: 30 mg/dL / Nitrite: Negative   Leuk Esterase: Moderate / RBC: 4 /HPF /  /HPF   Sq Epi: x / Non Sq Epi: 0 /HPF / Bacteria: Negative                              7.1    23.68 )-----------( 244      ( 18 Jan 2023 11:00 )             21.5                         7.3    20.30 )-----------( 206      ( 18 Jan 2023 00:47 )             22.2       Imaging:  < from: Colonoscopy (01.06.23 @ 07:36) >  Findings:       Non-bleeding internal hemorrhoids were found during retroflexion and        during endoscopy. The hemorrhoids were medium-sized.       A 15 mm polyp was found in the mid sigmoid colon. The polyp was        multi-lobulated. Biopsies were taken with a cold forceps for histology.        Polypectomy was not attempted given need for heparin drip.       A 3 mm polyp was found in the cecum. The polyp was sessile. Polypectomy        was not attempted given need for heparin drip.       A few small-mouthed diverticula were found in the sigmoid colon.       The exam was otherwise normal throughout the examined colon.                                                                                   Impression:          - Preparation of the colon was fair.                       - Non-bleeding internal hemorrhoids.                       -One 15 mm benign-appearing (ie adenomatous rather                        malignant) polyp in the mid sigmoid colon. Biopsied.                        Polypectomy was not attempted given need for heparin                        drip.    - One 3 mm polyp in the cecum. Polypectomy was not                        attempted given need for heparin drip.                       - Diverticulosis in the sigmoid colon.                       - No large masses seen.  Recommendation:      - Return patient to [facility].                       - Await pathology results.                       - Diet per primary team.                       - OK to resume heparin drip.                       - Would recommend repeat colonoscopyin 3-6 months or                        when able to hold A/C for polyp removal.    < end of copied text >  < from: CT Angio Chest PE Protocol w/ IV Cont (01.18.23 @ 04:47) >  PROCEDURE:  CT Angiogram of the chest was obtained with intravenous contrast. Three  dimensional maximum intensity projection (MIP) images were generated.    FINDINGS:  Mild respiratory motion artifact.    PULMONARY ANGIOGRAM: Poor contrast opacification limits evaluation. There   is no main, left or right main pulmonary artery embolism. The lobar,   segmental and subsegmental arteries cannot be evaluated.    LYMPH NODES: Unremarkable.    HEART/VASCULATURE: Heart size normal. No pericardial effusion.    AIRWAYS/LUNGS/PLEURA: Central airways are patent. Unchanged 3 mm right   middle lobe nodule (3, 292). Unchanged 2 mm left upper lobe nodule   (3:149). The lungs are otherwise clear. No pleural effusion. No   pneumothorax.    UPPER ABDOMEN: Unremarkable.    BONES/SOFT TISSUES: Degenerative changes. Left chest wall loop recorder.    IMPRESSION:  Suboptimal opacification of the pulmonary arteries, significantly limited   to assess for pulmonary embolism. No evidence of pulmonary embolus within   the main or left/right main pulmonary arteries.    < end of copied text >  < from: Xray Tibia + Fibula 2 Views, Left (01.18.23 @ 02:19) >  FINDINGS:    No acute fractures or dislocations. No cortical erosions.    Joint spaces are maintained    No tracking soft tissue gas cautions.    Soft tissue swelling/edema is noted.    IMPRESSION:  Soft tissue swelling/edema without appreciated gas.    No acute osseous abnormalities.    < end of copied text >  < from: CT Head No Cont (01.18.23 @ 01:58) >  FINDINGS:    Evaluation is mildly degradedby motion. There is no acute intra-axial or   extra-axial hemorrhage. There is no mass effect or shift of the midline.   The basal cisterns are not effaced. There is cerebral and cerebellar   volume loss with prominence of the ventricles, sulci, and cerebellar   folia. There are mild chronic ischemic changes in the frontoparietal   white matter. There are senescent calcifications in the basal ganglia and   coarse calcifications in the central alexis, unchanged. There are mild   atherosclerotic calcifications of the intracranial carotid arteries.    The regional soft tissues and osseous structures are unremarkable. The   visualized paranasal sinuses and tympanic/mastoid cavities are clear   apart from minimal bilateral ethmoid and maxillary sinus mucosal   thickening.    IMPRESSION:    No acute intracranial intercranial hemorrhage or mass effect. No interval   change compared to prior exam from 12/5/2022.    < end of copied text >  < from: US Duplex Venous Lower Ext Ltd, Left (01.18.23 @ 01:27) >  INTERPRETATION:  CLINICAL INFORMATION: Left lower extremity swelling    COMPARISON: Ultrasound lower extremity 11/5/2022    TECHNIQUE: Duplex sonography of the LEFT LOWER extremity veins with color   and spectral Doppler, with and without compression.    FINDINGS:  Left common femoral vein and femoral vein are patent and compressible.   Deep venous thrombosis in the left popliteal vein which is   noncompressible, as well as in the left posterior tibial vein and   gastrocnemius vein. Poor spectral Doppler flow within the thrombosed   segments.  Subcutaneous edema.    The contralateral common femoral vein is unremarkable.    IMPRESSION:  Acute deep venous thrombosis in the left popliteal vein, left posterior   tibial vein, and left gastrocnemius vein..    Findings were discussed by Dr. Danielle with  MD Marvin on 1/18/2023 1:34   AM with read back confirmation.    < end of copied text >           HPI:  Estiven Simeon is a 52-year-old  male presented with PMHx of TIAs (2011, 2013), CVAs x3 (2/2022, 8/3/2022, 11/5/2022) w/residual expressive aphasia on Eliquis, dyslipidemia and questionable ASD/PFO with a recent prolonged admission as outlined below representing to the hospital with slurred speech and AMS. GI consulted for further workup of occult anemia.    Patient recently had a very prolonged admission for AMS of unclear etiology. During that admission, he was intubated in the ED and monitored in MICU. Pt was weaned off pressors, extubated, and transitioned to floors 12/4. 12/5 am RRT called for seizures, again intubated then extubated 12/8. Hospital course was c/b partial SBO on CT now resolved in addition to sepsis of unclear etiology although treated with a course of Zosyn for presumed asp PNA. For workup of his AMS, he underwent EEG without any seizure activity. MRI was notable for  leptomeningeal disease, bilateral lentiform nucleus and pontine enhancement. He underwent repeated LPs with cytology negative fo malignant cells. He also also noted to have thickening vs. underdistention of rectum on CT AP. He underwent c-scope on 1/6 with diverticulosis in the sigmoid colon, non-bleeding internal hemorrhoid in addition to one15 mm benign-appearing (ie adenomatous rather malignant) polyp in the mid sigmoid colon with bx showing tubular adenoma with polypectomy was not attempted given need for heparin drip for DVT. There were NO endoscopic findings to account for the above .IR was consult for core needle biopsy of LN given no window to Bx 1/10. He was discharged to a rehab with overall improved metnal status.     He represents to the hospital with AMs and slurred speech. He  was doing well until yesterday his parents noted him to be somewhat confused and having word finding difficulties with some odd behavior where he was pulling at sheets and the strings of the masks.  He did not have this behavior before.  Only residual deficit from his previous strokes with some random word finding abilities however his speech today is reported to be significantly worse with worse word finding. Workup in the ED notable for labs with a CBC of 7 from recent d/c of 13with BUN/Cr ratio <30. CT of the head without any acute findings and CT chest with no underlying PE. Exam notable for very distended LLE although unclear how different compared to last time. Subjective somewhat limited from pt although denies any episodes of   melena, hematochezia or hematemesis Regarding medications, patient is currently on ASA and Enoxaparin for DVT. Above hisotry confirmed with facility. Denies any ongoing n/v/abdominal pain although does note a lot of LLE pain.     Allergies:  No Known Allergies    Home Medications:  aspirin 81 mg oral delayed release tablet: 1 tab(s) orally once a day (02 Dec 2022 23:10)  atorvastatin 40 mg oral tablet: 1 tab(s) orally once a day (02 Dec 2022 23:46)  bisacodyl 10 mg rectal suppository: 1 suppository(ies) rectal once a day (11 Jan 2023 12:52)  enoxaphrin: 100 milligram(s) subcutaneous 2 times a day (11 Jan 2023 12:52)  Keppra: 1500 milligram(s) orally 2 times a day (11 Jan 2023 12:52)  lidocaine 4% topical film: Apply topically to affected area once a day (11 Jan 2023 12:52)  Multiple Vitamins oral tablet: 1 tab(s) orally once a day (11 Jan 2023 12:52)  oxyCODONE 5 mg oral tablet: 1 tab(s) orally every 6 hours, As needed, moderate and severe pain (11 Jan 2023 12:52)  traMADol 50 mg oral tablet: 0.5 tab(s) orally every 6 hours, As needed, Moderate Pain (4 - 6) (11 Jan 2023 12:52)  Vimpat 100 mg oral tablet: 1 tab(s) orally 2 times a day (11 Jan 2023 12:52)      Hospital Medications:  aspirin enteric coated 81 milliGRAM(s) Oral daily  atorvastatin 40 milliGRAM(s) Oral at bedtime  lacosamide 100 milliGRAM(s) Oral two times a day  levETIRAcetam 1500 milliGRAM(s) Oral two times a day  oxyCODONE    IR 5 milliGRAM(s) Oral every 6 hours PRN  sodium chloride 0.9%. 1000 milliLiter(s) IV Continuous <Continuous>      PMHX/PSHX:    History of TIAs  CVA (cerebrovascular accident)  HLD (hyperlipidemia)  Vertigo  Unresponsiveness      Family history: No fam hx of CRC     Social History:   Tob: Denies  EtOH: Denies  Illicit Drugs: Denies    ROS: Complete and normal except as mentioned above    PHYSICAL EXAM:   GENERAL:  No acute distress, interactive, but altered  HEENT:  NCAT, no scleral icterus   CHEST:  no respiratory distress  HEART:  tachycardic to 120-130s  ABDOMEN:  Soft, non-tender, non-distended, normoactive bowel sounds. Hematoma over the RLQ. Rectal exam with no stool in the rectal vault.   EXTREMITIES: 3-4+ LLE Edema with firmness and limited distal pulses  NEURO/PSYCH:  Alert and oriented x1, slurred speech     Vital Signs:  Vital Signs Last 24 Hrs  T(C): 37.1 (18 Jan 2023 08:30), Max: 37.4 (17 Jan 2023 21:44)  T(F): 98.8 (18 Jan 2023 08:30), Max: 99.3 (17 Jan 2023 21:44)  HR: 107 (18 Jan 2023 08:30) (105 - 118)  BP: 135/86 (18 Jan 2023 08:30) (128/86 - 149/77)  BP(mean): --  RR: 17 (18 Jan 2023 08:30) (16 - 18)  SpO2: 100% (18 Jan 2023 08:30) (100% - 100%)    Parameters below as of 18 Jan 2023 08:30  Patient On (Oxygen Delivery Method): room air      Daily Height in cm: 190.5 (17 Jan 2023 21:44)    Daily     LABS:                        7.1    23.68 )-----------( 244      ( 18 Jan 2023 11:00 )             21.5     Mean Cell Volume: 91.9 fL (01-18-23 @ 11:00)    01-18    131<L>  |  97<L>  |  15  ----------------------------<  143<H>  4.5   |  25  |  0.77    Ca    8.8      18 Jan 2023 00:47    TPro  6.8  /  Alb  3.1<L>  /  TBili  0.6  /  DBili  x   /  AST  91<H>  /  ALT  48<H>  /  AlkPhos  64  01-18    LIVER FUNCTIONS - ( 18 Jan 2023 00:47 )  Alb: 3.1 g/dL / Pro: 6.8 g/dL / ALK PHOS: 64 U/L / ALT: 48 U/L / AST: 91 U/L / GGT: x           PT/INR - ( 18 Jan 2023 00:47 )   PT: 14.1 sec;   INR: 1.21 ratio         PTT - ( 18 Jan 2023 11:00 )  PTT:62.8 sec  Urinalysis Basic - ( 18 Jan 2023 07:46 )    Color: Yellow / Appearance: Clear / SG: >1.050 / pH: x  Gluc: x / Ketone: Trace  / Bili: Negative / Urobili: <2 mg/dL   Blood: x / Protein: 30 mg/dL / Nitrite: Negative   Leuk Esterase: Moderate / RBC: 4 /HPF /  /HPF   Sq Epi: x / Non Sq Epi: 0 /HPF / Bacteria: Negative                              7.1    23.68 )-----------( 244      ( 18 Jan 2023 11:00 )             21.5                         7.3    20.30 )-----------( 206      ( 18 Jan 2023 00:47 )             22.2       Imaging:  < from: Colonoscopy (01.06.23 @ 07:36) >  Findings:       Non-bleeding internal hemorrhoids were found during retroflexion and        during endoscopy. The hemorrhoids were medium-sized.       A 15 mm polyp was found in the mid sigmoid colon. The polyp was        multi-lobulated. Biopsies were taken with a cold forceps for histology.        Polypectomy was not attempted given need for heparin drip.       A 3 mm polyp was found in the cecum. The polyp was sessile. Polypectomy        was not attempted given need for heparin drip.       A few small-mouthed diverticula were found in the sigmoid colon.       The exam was otherwise normal throughout the examined colon.                                                                                   Impression:          - Preparation of the colon was fair.                       - Non-bleeding internal hemorrhoids.                       -One 15 mm benign-appearing (ie adenomatous rather                        malignant) polyp in the mid sigmoid colon. Biopsied.                        Polypectomy was not attempted given need for heparin                        drip.    - One 3 mm polyp in the cecum. Polypectomy was not                        attempted given need for heparin drip.                       - Diverticulosis in the sigmoid colon.                       - No large masses seen.  Recommendation:      - Return patient to [facility] for [facility] care.                       - Await pathology results.                       - Diet per primary team.                       - OK to resume heparin drip.                       - Would recommend repeat colonoscopyin 3-6 months or                        when able to hold A/C for polyp removal.    < end of copied text >  < from: CT Angio Chest PE Protocol w/ IV Cont (01.18.23 @ 04:47) >  PROCEDURE:  CT Angiogram of the chest was obtained with intravenous contrast. Three  dimensional maximum intensity projection (MIP) images were generated.    FINDINGS:  Mild respiratory motion artifact.    PULMONARY ANGIOGRAM: Poor contrast opacification limits evaluation. There   is no main, left or right main pulmonary artery embolism. The lobar,   segmental and subsegmental arteries cannot be evaluated.    LYMPH NODES: Unremarkable.    HEART/VASCULATURE: Heart size normal. No pericardial effusion.    AIRWAYS/LUNGS/PLEURA: Central airways are patent. Unchanged 3 mm right   middle lobe nodule (3, 292). Unchanged 2 mm left upper lobe nodule   (3:149). The lungs are otherwise clear. No pleural effusion. No   pneumothorax.    UPPER ABDOMEN: Unremarkable.    BONES/SOFT TISSUES: Degenerative changes. Left chest wall loop recorder.    IMPRESSION:  Suboptimal opacification of the pulmonary arteries, significantly limited   to assess for pulmonary embolism. No evidence of pulmonary embolus within   the main or left/right main pulmonary arteries.    < end of copied text >  < from: Xray Tibia + Fibula 2 Views, Left (01.18.23 @ 02:19) >  FINDINGS:    No acute fractures or dislocations. No cortical erosions.    Joint spaces are maintained    No tracking soft tissue gas cautions.    Soft tissue swelling/edema is noted.    IMPRESSION:  Soft tissue swelling/edema without appreciated gas.    No acute osseous abnormalities.    < end of copied text >  < from: CT Head No Cont (01.18.23 @ 01:58) >  FINDINGS:    Evaluation is mildly degradedby motion. There is no acute intra-axial or   extra-axial hemorrhage. There is no mass effect or shift of the midline.   The basal cisterns are not effaced. There is cerebral and cerebellar   volume loss with prominence of the ventricles, sulci, and cerebellar   folia. There are mild chronic ischemic changes in the frontoparietal   white matter. There are senescent calcifications in the basal ganglia and   coarse calcifications in the central alexis, unchanged. There are mild   atherosclerotic calcifications of the intracranial carotid arteries.    The regional soft tissues and osseous structures are unremarkable. The   visualized paranasal sinuses and tympanic/mastoid cavities are clear   apart from minimal bilateral ethmoid and maxillary sinus mucosal   thickening.    IMPRESSION:    No acute intracranial intercranial hemorrhage or mass effect. No interval   change compared to prior exam from 12/5/2022.    < end of copied text >  < from: US Duplex Venous Lower Ext Ltd, Left (01.18.23 @ 01:27) >  INTERPRETATION:  CLINICAL INFORMATION: Left lower extremity swelling    COMPARISON: Ultrasound lower extremity 11/5/2022    TECHNIQUE: Duplex sonography of the LEFT LOWER extremity veins with color   and spectral Doppler, with and without compression.    FINDINGS:  Left common femoral vein and femoral vein are patent and compressible.   Deep venous thrombosis in the left popliteal vein which is   noncompressible, as well as in the left posterior tibial vein and   gastrocnemius vein. Poor spectral Doppler flow within the thrombosed   segments.  Subcutaneous edema.    The contralateral common femoral vein is unremarkable.    IMPRESSION:  Acute deep venous thrombosis in the left popliteal vein, left posterior   tibial vein, and left gastrocnemius vein..    Findings were discussed by Dr. Danielle with  MD Marvin on 1/18/2023 1:34   AM with read back confirmation.    < end of copied text >

## 2023-01-18 NOTE — ED ADULT NURSE NOTE - OBJECTIVE STATEMENT
Pt A&Ox2 with episodes of confusion, PMH Vertigo, HLD, CVA, presenting to the ED (RM 26) c/o AMS. Pt brought from [facility] for AMS. Friend at bedside acting as the primary historian. States patient had normal mental status until yesterday evening. [facility] patient was having difficulty expressing himself and finding "the right words". LLE noted to be swollen, ecchymosis, firm to touch, warm to touch noted. Pt able to move extremity. No other neuro deficit noted. States patient is on lovenox. Respirations are even and unlabored, Pt changed and repositioned for comfort, NAD, VS noted, 20G IV RAC, labs sent, RVP sent. Safety precautions implemented as per protocol, awaiting further MD orders, will continue with plan of care.

## 2023-01-18 NOTE — H&P ADULT - NSHPPHYSICALEXAM_GEN_ALL_CORE
Vital Signs Last 24 Hrs  T(C): 37.1 (18 Jan 2023 08:30), Max: 37.4 (17 Jan 2023 21:44)  T(F): 98.8 (18 Jan 2023 08:30), Max: 99.3 (17 Jan 2023 21:44)  HR: 107 (18 Jan 2023 08:30) (105 - 118)  BP: 135/86 (18 Jan 2023 08:30) (128/86 - 149/77)  BP(mean): --  RR: 17 (18 Jan 2023 08:30) (16 - 18)  SpO2: 100% (18 Jan 2023 08:30) (100% - 100%) Vital Signs Last 24 Hrs  T(C): 37.1 (18 Jan 2023 08:30), Max: 37.4 (17 Jan 2023 21:44)  T(F): 98.8 (18 Jan 2023 08:30), Max: 99.3 (17 Jan 2023 21:44)  HR: 107 (18 Jan 2023 08:30) (105 - 118)  BP: 135/86 (18 Jan 2023 08:30) (128/86 - 149/77)  BP(mean): --  RR: 17 (18 Jan 2023 08:30) (16 - 18)  SpO2: 100% (18 Jan 2023 08:30) (100% - 100%)    Appearance: Normal	  HEENT:   Normal oral mucosa, PERRL, EOMI	  Lymphatic: No lymphadenopathy , no edema  Cardiovascular: Normal S1 S2, No JVD, No murmurs , Peripheral pulses palpable 2+ bilaterally  Respiratory: Lungs clear to auscultation, normal effort 	  Gastrointestinal:  Soft, Non-tender, + BS	  Skin: No rashes, No ecchymoses, No cyanosis, warm to touch  Musculoskeletal: Normal range of motion, normal strength  Psychiatry:  Mood & affect appropriate  Ext: L LEG swelling, tenderness

## 2023-01-18 NOTE — CONSULT NOTE ADULT - ATTENDING COMMENTS
pt was seen and examined emergently  he was found to have left leg swelling and compartment syndrome   consent was obtained for parents and he was emergently taken to the OR
GI consulted for acute anemia without overt bleeding.   Patient without GI complaints, though history somewhat limited due to AMS. Exam notable for abdominal wall ecchymoses as well as significant LLE edema, tense/firm on palpation.   Given significant Hgb drop without overt bleeding, would assess for alternate sources. Low suspicion for clinically significant GI bleed at this time. Planned for emergent fasciotomy by vascular team given left leg compartment syndrome. Would consider CTAP to rule out RP bleed when able.   Additional recommendations as above.

## 2023-01-18 NOTE — CONSULT NOTE ADULT - ASSESSMENT
52 year old with recent admission for change in MS , presents with change in MS and associated leukocytosis.  Found to have acute dvt of the left leg with tender/ distended leg      1) leukocytosis  nonspecific finding    ? reactive to acute dvt    Check blood cultures times two    Hemarthrosis during prior admission./ Now with DVT.  Hematology follow up       2) Left leg pain  Leg with dvt  Swollen with some bruising.  Pain to palpation  I called vascular to evaluate the patient    Leg does not appear infected      3) abnormal imaging  Prior abnormal brain imaging  Ct with nonspecific lad    Consider repeat CT CAP    4) Change in MS  neurology follow up

## 2023-01-18 NOTE — CONSULT NOTE ADULT - ASSESSMENT
Estiven Simeon is a 52-year-old  male presented with PMHx of TIAs (2011, 2013), CVAs x3 (2/2022, 8/3/2022, 11/5/2022) w/residual expressive aphasia on Eliquis, dyslipidemia and questionable ASD/PFO with a recent prolonged admission as outlined below representing to the hospital with slurred speech and AMS. GI consulted for further workup of occult anemia.    #Occult Anemia   Patient presenting with AMS and slurred speech found of unclear etiology. Incidentally found to have a hgb of 7 from recent d/c of 13. Risk factors for bleeding include ASA and Enoxaparin although rectal exam with NO overt signs of bleeding on exam with no ongoing bleeding over the past week per pt and facility. Recent c-scope on 1/ during last admission with diverticulosis in the sigmoid colon, non-bleeding internal hemorrhoid in addition to one15 mm benign-appearing with path showing TA. Exam is notable for a hematoma over the abdominal wall with extremely distended LLE. Overall suspicion for overt UGIB low at this time. Would consider abdominal wall and leg imaging to r/o RP hematoma ni addition to hematoma into the LLE. No plans for any endoscopic intervention at this time. If pt devolops any overt signs of bleeding can reconsider need for endoscopic evaluation.    Recommendations  - Considerer CT AP to r/o RP hematoma   - No plans for any endoscopic intervention at this time. Please notify us if patient develops any overt signs of bleeding.   - CBC QD and transfuse to maintain Hbg > 7.0   - PO PPI QD for gastroprotection   - Maintain active type & screen and access with 2 x Large bore IV    All recommendations are tentative until note is attested by attending.     Be Sullivan, PGY-4  Gastroenterology/Hepatology Fellow  Available on [employer] Teams   [phone] (Long Range Pager)  [phone] (Short Range Pager [facility])    After 5pm, please contact the on-call GI fellow. [phone]     Estiven Simeon is a 52-year-old  male presented with PMHx of TIAs (2011, 2013), CVAs x3 (2/2022, 8/3/2022, 11/5/2022) w/residual expressive aphasia on Eliquis, dyslipidemia and questionable ASD/PFO with a recent prolonged admission as outlined below representing to the hospital with slurred speech and AMS. GI consulted for further workup of occult anemia.    #Occult Anemia   Patient presenting with AMS and slurred speech found of unclear etiology. Incidentally found to have a hgb of 7 from recent d/c of 13. Risk factors for bleeding include ASA and Enoxaparin although rectal exam with NO overt signs of bleeding on exam with no ongoing bleeding over the past week per pt and facility. Recent c-scope on 1/ during last admission with diverticulosis in the sigmoid colon, non-bleeding internal hemorrhoid in addition to one15 mm benign-appearing with path showing TA. Exam is notable for a hematoma over the abdominal wall with extremely distended LLE. Overall suspicion for overt UGIB low at this time.     Recommendations  - Would consider abdominal wall and leg imaging to r/o RP hematoma in addition to hematoma into the LLE.   - No plans for any endoscopic intervention at this time. Please notify us if patient develops any overt signs of bleeding.   - CBC QD and transfuse to maintain Hbg > 7.0   - PO PPI QD for gastroprotection   - Maintain active type & screen and access with 2 x Large bore IV    All recommendations are tentative until note is attested by attending.     Be Sullivan, PGY-4  Gastroenterology/Hepatology Fellow  Available on [employer] Teams   [phone] (Long Range Pager)  [phone] (Short Range Pager [facility])    After 5pm, please contact the on-call GI fellow. [phone]

## 2023-01-18 NOTE — CONSULT NOTE ADULT - ASSESSMENT
This is a 52 year old male who presents with altered mental status.     1. Anemia   -- Hg 7.1 today. During previous admission, hg remained 11-12.   -- Occult blood negative. Labs show some iron deficiency but elevated ferritin. No evidence of B12/folate deficiency or hemolysis.   -- GI consulted, low suspicion for GI bleed. Pt had colonoscopy last admission w/o bleeding.   -- Follow up CT a/p to r/o RP bleed   -- Transfuse to maintain hg >7    2. r/o malignancy   -- Prior admission had MRI with abnormal findings, possible malignancy. LP w/o malignant cells. Colonoscopy with biopsy of polyp lesion showing tubular adenoma. Pelvic LN biopsy requested however risks outweighed benefits.   -- Check tumor markers: CEA, , PSA, AFP   -- Outpatient follow-up to discuss need for PET/CT     3. DVT   -- US LE shows new acute LLE DVT compared to recent imaging on 12/31 despite pt on Lovenox  -- Unclear if he was compliant with anticoagulation   -- Will check hypercoagulable workup however some values may be inaccurate in setting of acute thrombosis     4. AMS   -- per neurology  -- Follow up repeat MRI     Will continue to follow.    Caro Dow PA-C  Hematology/Oncology  [facility]  [phone] (office)  [phone] (alt office)  Evenings and weekends please call MD on call or office   This is a 52 year old male who presents with altered mental status.     1. Anemia   -- Hg 7.1 today. During previous admission, hg remained 11-12.   -- Occult blood negative. Labs show some iron deficiency but elevated ferritin. No evidence of B12/folate deficiency or hemolysis.   -- GI consulted, low suspicion for GI bleed. Pt had colonoscopy last admission w/o bleeding.   -- Follow up CT a/p to r/o RP bleed   -- Transfuse to maintain hg >7    2. r/o malignancy   -- Prior admission had MRI with abnormal findings, possible malignancy. LP w/o malignant cells. Colonoscopy with biopsy of polyp lesion showing tubular adenoma. Pelvic LN biopsy requested however risks outweighed benefits.   -- Check tumor markers: CEA, , PSA, AFP   -- Outpatient follow-up to discuss need for PET/CT     3. DVT   -- US LE shows new acute LLE DVT compared to recent imaging on 12/31 despite pt on Lovenox  -- Unclear if he was compliant with anticoagulation   -- Will check hypercoagulable workup however some values may be inaccurate in setting of acute thrombosis     4. AMS   -- per neurology  -- Follow up repeat MRI     Will continue to follow.    Caro Dow PA-C  Hematology/Oncology  [facility]  [phone] (office)  [phone] (alt office)  Evenings and weekends please call MD on call or office      Addendum:  Agree with plan as above. Will cont to follow, patient going for emergent fasciotomy procedure. Malignancy work up so far negative. Difficult to obtain hx from this patient. F/u hypercoag panel for recurrent DVT.    Jeff Ayala MD  [facility]  Cell: [phone]

## 2023-01-18 NOTE — H&P ADULT - HISTORY OF PRESENT ILLNESS
Patient is a 52-year-old male with a past medical history of CVA/TIA, ASD/PFO?, hyperlipidemia presents emerged department today with altered mental status from his nursing facility.  Patient just had a stay in this hospital when he was found unresponsive at home.  An extensive work-up with no significant etiology found.  There were thoughts that it was related to seizure activity however neurology did not find any seizure activity on their work-up.  Patient went to a nursing facility and rehab.  Was doing well until yesterday his parents noted him to be somewhat confused and having word finding difficulties.  Today his speech was worse when noticed by the wife.  He is also noted to have odd behavior where he was pulling at sheets and the strings of the masks.  He did not have this behavior before.  Only residual deficit from his previous strokes with some random word finding abilities however his speech today is reported to be significantly worse with worse word finding.  The patient denies any pain at this time.  He does acknowledge that he is having trouble speaking.  There is also report from the family and patient is complaining of left calf pain.  This pain is much more severe than it has been in the past.

## 2023-01-18 NOTE — CONSULT NOTE ADULT - ASSESSMENT
52-year-old  M  TIAs hx TIAS and strokes initially thought to be 2/2 testosterone with recent admission for seizures and AMS here with confusion. PE with psychomotor slowing, aphasia frontal reflexes bilaterally distal LE weakness here iwth AMS, found to have a DVT iun the LLE MRi on 12/29/22 showed chronic right frontalm centrum semiovale strokes and bilateral lentiform and pontine enhancemeent      Impression: AMS with bilateral pontine and lentiform enhancement ? Clippers vs other    -LP with extensive CSF studies unrevealing  -Body imaging unrevealing, pelvic LNS not amenable to BX as per IR  -Repeat MRI brain w/w/o con, would obtain MRI C-spine w/con  -After MRI will discuss role of pulse steroids followed by high dose steroid with potential comparison MRI  -Would reach out to NSX if amenable to biopsy  -VEEG  -On Keppra 1500 mg BID and Vimpat 100 mg BID  outpatient hypercoag workup neg    52-year-old  M  TIAs hx TIAS and strokes initially thought to be 2/2 testosterone with recent admission for seizures and AMS here with confusion. PE with psychomotor slowing, aphasia frontal reflexes bilaterally distal LE weakness here iwth AMS, found to have a DVT iun the LLE MRi on 12/29/22 showed chronic right frontalm centrum semiovale strokes and bilateral lentiform and pontine enhancemeent      Impression: AMS with bilateral pontine and lentiform enhancement ? Clippers vs other    -LP with extensive CSF studies unrevealing  -Body imaging unrevealing, pelvic LNS not amenable to BX as per IR  -Repeat MRI brain w/w/o con, would obtain MRI C-spine w/con  -After MRI will discuss role of pulse steroids followed by high dose steroid with potential comparison MRI  -Would reach out to NSX if amenable to biopsy  -VEEG  -On Keppra 1500 mg BID and Vimpat 100 mg BID  outpatient hypercoag workup neg   On AC  -vascular sx consulted

## 2023-01-18 NOTE — CONSULT NOTE ADULT - ASSESSMENT
Mr. Caceres is 52 M w hx of multiple CVA in last year with residual deficit of word finding difficulty, recent admission for unresponsiveness fth seizure activity @ OSH, who presents with AMS today from nursing facility, found to have acute onset LLE DVT L pop v, L PT v, and L gastroc v cb compartment syndrome of LLE. Patient requires emergent surgical decompression.     Plan:   -Emergent OR for 4 compartment fasciotomy of LLE   -Continue w Hep gtt   -Transfuse Hgb >7  -Consent  -POC  -Appreciate consultant recs  -Care per primary team    Discussed with Vascular Fellow Dr. Nicole on behalf of Attending Surgeon Dr. Sarah Agarwal MD PGY2   C Team   [id number]

## 2023-01-18 NOTE — ED ADULT NURSE REASSESSMENT NOTE - NS ED NURSE REASSESS COMMENT FT1
Pt noted to have increased bruising on the right lower side of the abdomen, R arm, Left ankle. NP So notified, NP at bedside to evaluate. Heparin drip discontinued. Labs drawn and sent. Pt noted to be pale, restless, able to follow commands, A&Ox4. RR equal and unlabored. Left leg still noted to swollen and warm. Comfort measures provided. Care plan continued as per ordered. Safety maintained.

## 2023-01-18 NOTE — ASU PREOP CHECKLIST - 2.
Bruising to left leg, right abdomen Blanchable redness to bilateral elbow and sacrum Bruising to CIRCUMFERENTIAL left leg AND right abdomen, ECCHYMOSIS TO RIGHT WRIST, BRUISING TO INNER RIGHT ANKLE, Blanchable redness to bilateral elbow and sacrum, INTACT SCAB LEFT WRIST

## 2023-01-18 NOTE — ED PROVIDER NOTE - CLINICAL SUMMARY MEDICAL DECISION MAKING FREE TEXT BOX
52-year-old male with past medical history of CVA hyperlipidemia presenting with altered mental status with speech finding issues as well as left lower extremity swelling.  Does have a DVT  His previous discharge and is currently on Lovenox however discussion with patient and family states that the leg is gotten significantly worse and more painful.  Ultrasound was performed which shows a acute DVT at the popliteal going distally.  Patient will be anticoagulated with heparin at this time.  There is also with this new speech difficulty concern for CVA or TIA.  CT scan of the head that was unremarkable.  Will likely need MRI.  Discussed with the patient's physician who admitted him last time Dr. Caal who will admit to the hospital.

## 2023-01-18 NOTE — ED ADULT NURSE REASSESSMENT NOTE - NS ED NURSE REASSESS COMMENT FT1
Pt resting comfortably in bed A&Ox4, confused at times, on heparin drip running at 14units/hr. Pt next appt at 10:43am. Pt PERRLA, Strength and sensation equal in the bilateral upper extremities, L lower extremity noted to have decreased strength when compared to the right, warmth and swelling noted in the left lower extremity. RR equal and unlabored, sinus tachycardia on cardiac monitor. Pt denies chest pain, SOB, abdominal pain, N/V/D, headache and dizziness at this time. Comfort measures provided. Care plan continued. Safety maintained. Seizure precautions in place. Pt resting comfortably in bed A&Ox3, confused at times, on heparin drip running at 14units/hr. Pt next appt at 10:43am. Pt PERRLA, Strength and sensation equal in the bilateral upper extremities, L lower extremity noted to have decreased strength when compared to the right, warmth and swelling noted in the left lower extremity. RR equal and unlabored, sinus tachycardia on cardiac monitor. Pt denies chest pain, SOB, abdominal pain, N/V/D, headache and dizziness at this time. Comfort measures provided. Care plan continued. Safety maintained. Seizure precautions in place.

## 2023-01-19 LAB
AFP-TM SERPL-MCNC: 1.9 NG/ML — SIGNIFICANT CHANGE UP
ALBUMIN SERPL ELPH-MCNC: 2.8 G/DL — LOW (ref 3.3–5)
ALP SERPL-CCNC: 66 U/L — SIGNIFICANT CHANGE UP (ref 40–120)
ALT FLD-CCNC: 46 U/L — HIGH (ref 4–41)
ANION GAP SERPL CALC-SCNC: 13 MMOL/L — SIGNIFICANT CHANGE UP (ref 7–14)
APCR PPP: 2.32 RATIO — SIGNIFICANT CHANGE UP
AST SERPL-CCNC: 97 U/L — HIGH (ref 4–40)
AT III ACT/NOR PPP CHRO: 81 % — SIGNIFICANT CHANGE UP (ref 76–140)
BILIRUB SERPL-MCNC: 0.8 MG/DL — SIGNIFICANT CHANGE UP (ref 0.2–1.2)
BUN SERPL-MCNC: 20 MG/DL — SIGNIFICANT CHANGE UP (ref 7–23)
CALCIUM SERPL-MCNC: 8.3 MG/DL — LOW (ref 8.4–10.5)
CANCER AG19-9 SERPL-ACNC: 19 U/ML — SIGNIFICANT CHANGE UP
CEA SERPL-MCNC: 1.4 NG/ML — SIGNIFICANT CHANGE UP (ref 1–3.8)
CHLORIDE SERPL-SCNC: 99 MMOL/L — SIGNIFICANT CHANGE UP (ref 98–107)
CO2 SERPL-SCNC: 22 MMOL/L — SIGNIFICANT CHANGE UP (ref 22–31)
CREAT SERPL-MCNC: 0.81 MG/DL — SIGNIFICANT CHANGE UP (ref 0.5–1.3)
DRVVT SCREEN TO CONFIRM RATIO: NEGATIVE — SIGNIFICANT CHANGE UP
EGFR: 106 ML/MIN/1.73M2 — SIGNIFICANT CHANGE UP
GLUCOSE SERPL-MCNC: 127 MG/DL — HIGH (ref 70–99)
HCT VFR BLD CALC: 17.5 % — CRITICAL LOW (ref 39–50)
HCT VFR BLD CALC: 17.5 % — CRITICAL LOW (ref 39–50)
HCT VFR BLD CALC: 19.7 % — CRITICAL LOW (ref 39–50)
HGB BLD-MCNC: 5.6 G/DL — CRITICAL LOW (ref 13–17)
HGB BLD-MCNC: 5.7 G/DL — CRITICAL LOW (ref 13–17)
HGB BLD-MCNC: 6.6 G/DL — CRITICAL LOW (ref 13–17)
LA NT DPL PPP QL: 43.1 SEC — SIGNIFICANT CHANGE UP
MAGNESIUM SERPL-MCNC: 2.1 MG/DL — SIGNIFICANT CHANGE UP (ref 1.6–2.6)
MCHC RBC-ENTMCNC: 29.9 PG — SIGNIFICANT CHANGE UP (ref 27–34)
MCHC RBC-ENTMCNC: 29.9 PG — SIGNIFICANT CHANGE UP (ref 27–34)
MCHC RBC-ENTMCNC: 30.3 PG — SIGNIFICANT CHANGE UP (ref 27–34)
MCHC RBC-ENTMCNC: 32 GM/DL — SIGNIFICANT CHANGE UP (ref 32–36)
MCHC RBC-ENTMCNC: 32.6 GM/DL — SIGNIFICANT CHANGE UP (ref 32–36)
MCHC RBC-ENTMCNC: 33.5 GM/DL — SIGNIFICANT CHANGE UP (ref 32–36)
MCV RBC AUTO: 89.1 FL — SIGNIFICANT CHANGE UP (ref 80–100)
MCV RBC AUTO: 93.1 FL — SIGNIFICANT CHANGE UP (ref 80–100)
MCV RBC AUTO: 93.6 FL — SIGNIFICANT CHANGE UP (ref 80–100)
NRBC # BLD: 0 /100 WBCS — SIGNIFICANT CHANGE UP (ref 0–0)
NRBC # FLD: 0 K/UL — SIGNIFICANT CHANGE UP (ref 0–0)
NRBC # FLD: 0 K/UL — SIGNIFICANT CHANGE UP (ref 0–0)
NRBC # FLD: 0.02 K/UL — HIGH (ref 0–0)
PHOSPHATE SERPL-MCNC: 4.5 MG/DL — SIGNIFICANT CHANGE UP (ref 2.5–4.5)
PLATELET # BLD AUTO: 216 K/UL — SIGNIFICANT CHANGE UP (ref 150–400)
PLATELET # BLD AUTO: 227 K/UL — SIGNIFICANT CHANGE UP (ref 150–400)
PLATELET # BLD AUTO: 228 K/UL — SIGNIFICANT CHANGE UP (ref 150–400)
POTASSIUM SERPL-MCNC: 4.8 MMOL/L — SIGNIFICANT CHANGE UP (ref 3.5–5.3)
POTASSIUM SERPL-SCNC: 4.8 MMOL/L — SIGNIFICANT CHANGE UP (ref 3.5–5.3)
PROT C ACT/NOR PPP: 116 % — SIGNIFICANT CHANGE UP (ref 74–150)
PROT S FREE PPP-ACNC: 74 % — SIGNIFICANT CHANGE UP (ref 70–130)
PROT SERPL-MCNC: 6 G/DL — SIGNIFICANT CHANGE UP (ref 6–8.3)
PSA FLD-MCNC: 5.24 NG/ML — HIGH (ref 0–4)
RBC # BLD: 1.87 M/UL — LOW (ref 4.2–5.8)
RBC # BLD: 1.88 M/UL — LOW (ref 4.2–5.8)
RBC # BLD: 2.21 M/UL — LOW (ref 4.2–5.8)
RBC # FLD: 16 % — HIGH (ref 10.3–14.5)
RBC # FLD: 16.1 % — HIGH (ref 10.3–14.5)
RBC # FLD: 16.2 % — HIGH (ref 10.3–14.5)
SODIUM SERPL-SCNC: 134 MMOL/L — LOW (ref 135–145)
WBC # BLD: 15.15 K/UL — HIGH (ref 3.8–10.5)
WBC # BLD: 21.52 K/UL — HIGH (ref 3.8–10.5)
WBC # BLD: 23.91 K/UL — HIGH (ref 3.8–10.5)
WBC # FLD AUTO: 15.15 K/UL — HIGH (ref 3.8–10.5)
WBC # FLD AUTO: 21.52 K/UL — HIGH (ref 3.8–10.5)
WBC # FLD AUTO: 23.91 K/UL — HIGH (ref 3.8–10.5)

## 2023-01-19 PROCEDURE — 74176 CT ABD & PELVIS W/O CONTRAST: CPT | Mod: 26

## 2023-01-19 PROCEDURE — 99232 SBSQ HOSP IP/OBS MODERATE 35: CPT

## 2023-01-19 RX ORDER — ACETAMINOPHEN 500 MG
650 TABLET ORAL EVERY 6 HOURS
Refills: 0 | Status: DISCONTINUED | OUTPATIENT
Start: 2023-01-19 | End: 2023-01-22

## 2023-01-19 RX ADMIN — SODIUM CHLORIDE 125 MILLILITER(S): 9 INJECTION INTRAMUSCULAR; INTRAVENOUS; SUBCUTANEOUS at 13:45

## 2023-01-19 RX ADMIN — Medication 81 MILLIGRAM(S): at 18:02

## 2023-01-19 RX ADMIN — OXYCODONE HYDROCHLORIDE 5 MILLIGRAM(S): 5 TABLET ORAL at 00:36

## 2023-01-19 RX ADMIN — Medication 650 MILLIGRAM(S): at 17:00

## 2023-01-19 RX ADMIN — PIPERACILLIN AND TAZOBACTAM 25 GRAM(S): 4; .5 INJECTION, POWDER, LYOPHILIZED, FOR SOLUTION INTRAVENOUS at 00:11

## 2023-01-19 RX ADMIN — LACOSAMIDE 120 MILLIGRAM(S): 50 TABLET ORAL at 18:02

## 2023-01-19 RX ADMIN — PIPERACILLIN AND TAZOBACTAM 25 GRAM(S): 4; .5 INJECTION, POWDER, LYOPHILIZED, FOR SOLUTION INTRAVENOUS at 13:46

## 2023-01-19 RX ADMIN — Medication 650 MILLIGRAM(S): at 16:12

## 2023-01-19 RX ADMIN — OXYCODONE HYDROCHLORIDE 5 MILLIGRAM(S): 5 TABLET ORAL at 01:00

## 2023-01-19 RX ADMIN — Medication 650 MILLIGRAM(S): at 22:54

## 2023-01-19 RX ADMIN — OXYCODONE HYDROCHLORIDE 5 MILLIGRAM(S): 5 TABLET ORAL at 23:34

## 2023-01-19 RX ADMIN — ATORVASTATIN CALCIUM 40 MILLIGRAM(S): 80 TABLET, FILM COATED ORAL at 21:02

## 2023-01-19 RX ADMIN — OXYCODONE HYDROCHLORIDE 5 MILLIGRAM(S): 5 TABLET ORAL at 23:04

## 2023-01-19 RX ADMIN — PANTOPRAZOLE SODIUM 40 MILLIGRAM(S): 20 TABLET, DELAYED RELEASE ORAL at 13:47

## 2023-01-19 RX ADMIN — LEVETIRACETAM 400 MILLIGRAM(S): 250 TABLET, FILM COATED ORAL at 05:05

## 2023-01-19 RX ADMIN — LEVETIRACETAM 400 MILLIGRAM(S): 250 TABLET, FILM COATED ORAL at 18:02

## 2023-01-19 RX ADMIN — LACOSAMIDE 120 MILLIGRAM(S): 50 TABLET ORAL at 05:29

## 2023-01-19 RX ADMIN — Medication 650 MILLIGRAM(S): at 22:24

## 2023-01-19 RX ADMIN — PIPERACILLIN AND TAZOBACTAM 25 GRAM(S): 4; .5 INJECTION, POWDER, LYOPHILIZED, FOR SOLUTION INTRAVENOUS at 21:02

## 2023-01-19 RX ADMIN — PIPERACILLIN AND TAZOBACTAM 25 GRAM(S): 4; .5 INJECTION, POWDER, LYOPHILIZED, FOR SOLUTION INTRAVENOUS at 06:03

## 2023-01-19 NOTE — PROGRESS NOTE ADULT - ASSESSMENT
52-year-old  M  TIAs hx TIAS and strokes initially thought to be 2/2 testosterone with recent admission for seizures and AMS here with confusion. PE with psychomotor slowing, aphasia frontal reflexes bilaterally distal LE weakness here iwth AMS, found to have a DVT iun the LLE MRi on 12/29/22 showed chronic right frontalm centrum semiovale strokes and bilateral lentiform and pontine enhancemeent      Impression: AMS with bilateral pontine and lentiform enhancement ? Clippers vs other    -s/p fasciotomy for left leg compartment syndrome 1/18    When stabilized further w/u for above  -LP with extensive CSF studies unrevealing  -Body imaging unrevealing, pelvic LNS not amenable to BX as per IR  -Repeat MRI brain w/w/o con, would obtain MRI C-spine w/con  -After MRI will discuss role of pulse steroids followed by high dose steroid with potential comparison MRI  -Would reach out to NSX if amenable to biopsy  -VEEG  -On Keppra 1500 mg BID and Vimpat 100 mg BID  outpatient hypercoag workup neg

## 2023-01-19 NOTE — PROGRESS NOTE ADULT - NS ATTEND AMEND GEN_ALL_CORE FT
Interval events noted in chart. Also with worsening anemia and pending transfusion of additional 2U pRBC. No overt bleeding reported by patient or covering staff. Given ongoing transfusion requirements without any overt GI bleeding, lower suspicion that GIB is etiology for ongoing/persistent blood loss. Would continue PPI for now and await CTAP to rule out RP hematoma or alternate source. Ultimately, if ongoing transfusions requirements without alternative source identified and/or if overt bleeding, may need to consider endoscopic evaluation.

## 2023-01-19 NOTE — PROGRESS NOTE ADULT - SUBJECTIVE AND OBJECTIVE BOX
SURGERY  Pager: #[phone]      INTERVAL EVENTS/SUBJECTIVE: No acute events overnight. S/p L decompression fasciotomy of the lower extremity. Patient seen and examined on AM rounds, reports pain well controlled.     ______________________________________________  OBJECTIVE:   T(C): 36.7 (01-19-23 @ 06:30), Max: 37.2 (01-18-23 @ 18:40)  HR: 96 (01-19-23 @ 06:30) (96 - 119)  BP: 115/72 (01-19-23 @ 06:30) (107/74 - 143/78)  RR: 18 (01-19-23 @ 06:30) (14 - 18)  SpO2: 99% (01-19-23 @ 06:30) (96% - 100%)  Wt(kg): --  CAPILLARY BLOOD GLUCOSE      POCT Blood Glucose.: 140 mg/dL (18 Jan 2023 09:02)    I&O's Detail    18 Jan 2023 07:01  -  19 Jan 2023 07:00  --------------------------------------------------------  IN:    sodium chloride 0.9%: 125 mL  Total IN: 125 mL    OUT:    Heparin Infusion: 0 mL  Total OUT: 0 mL    Total NET: 125 mL          Physical exam:  Gen: resting in bed comfortably in NAD  Chest: no increased WOB, regular inspiratory effort   Abdomen: Soft, nontender, nondistended  Vascular: LLE wrapped in ACE with swelling, warm, +DP/PT signals, motor and sensation intact  Neuro: awake, alert  ______________________________________________  LABS:  CBC Full  -  ( 18 Jan 2023 11:00 )  WBC Count : 23.68 K/uL  RBC Count : 2.34 M/uL  Hemoglobin : 7.1 g/dL  Hematocrit : 21.5 %  Platelet Count - Automated : 244 K/uL  Mean Cell Volume : 91.9 fL  Mean Cell Hemoglobin : 30.3 pg  Mean Cell Hemoglobin Concentration : 33.0 gm/dL  Auto Neutrophil # : x  Auto Lymphocyte # : x  Auto Monocyte # : x  Auto Eosinophil # : x  Auto Basophil # : x  Auto Neutrophil % : x  Auto Lymphocyte % : x  Auto Monocyte % : x  Auto Eosinophil % : x  Auto Basophil % : x    01-18    131<L>  |  97<L>  |  15  ----------------------------<  143<H>  4.5   |  25  |  0.77    Ca    8.8      18 Jan 2023 00:47    TPro  6.8  /  Alb  3.1<L>  /  TBili  0.6  /  DBili  x   /  AST  91<H>  /  ALT  48<H>  /  AlkPhos  64  01-18    _____________________________________________  RADIOLOGY:

## 2023-01-19 NOTE — PROGRESS NOTE ADULT - SUBJECTIVE AND OBJECTIVE BOX
Patient seen and examined this am. s/p fasciotomy for compartment syndrome    MEDICATIONS:    aspirin enteric coated 81 milliGRAM(s) Oral daily  atorvastatin 40 milliGRAM(s) Oral at bedtime  lacosamide IVPB 100 milliGRAM(s) IV Intermittent every 12 hours  levETIRAcetam  IVPB 1500 milliGRAM(s) IV Intermittent every 12 hours  oxyCODONE    IR 5 milliGRAM(s) Oral every 6 hours PRN  pantoprazole  Injectable 40 milliGRAM(s) IV Push daily  piperacillin/tazobactam IVPB.. 3.375 Gram(s) IV Intermittent every 8 hours  sodium chloride 0.9%. 1000 milliLiter(s) IV Continuous <Continuous>      LABS:                          5.7    23.91 )-----------( 228      ( 19 Jan 2023 05:46 )             17.5     01-19    134<L>  |  99  |  20  ----------------------------<  127<H>  4.8   |  22  |  0.81    Ca    8.3<L>      19 Jan 2023 05:46  Phos  4.5     01-19  Mg     2.10     01-19    TPro  6.0  /  Alb  2.8<L>  /  TBili  0.8  /  DBili  x   /  AST  97<H>  /  ALT  46<H>  /  AlkPhos  66  01-19    CAPILLARY BLOOD GLUCOSE        PT/INR - ( 18 Jan 2023 00:47 )   PT: 14.1 sec;   INR: 1.21 ratio         PTT - ( 18 Jan 2023 11:00 )  PTT:62.8 sec  Urinalysis Basic - ( 18 Jan 2023 07:46 )    Color: Yellow / Appearance: Clear / SG: >1.050 / pH: x  Gluc: x / Ketone: Trace  / Bili: Negative / Urobili: <2 mg/dL   Blood: x / Protein: 30 mg/dL / Nitrite: Negative   Leuk Esterase: Moderate / RBC: 4 /HPF /  /HPF   Sq Epi: x / Non Sq Epi: 0 /HPF / Bacteria: Negative      I&O's Summary    18 Jan 2023 07:01  -  19 Jan 2023 07:00  --------------------------------------------------------  IN: 125 mL / OUT: 0 mL / NET: 125 mL      Vital Signs Last 24 Hrs  T(C): 36.7 (19 Jan 2023 06:30), Max: 37.2 (18 Jan 2023 18:40)  T(F): 98.1 (19 Jan 2023 06:30), Max: 99 (18 Jan 2023 18:40)  HR: 96 (19 Jan 2023 06:30) (96 - 119)  BP: 115/72 (19 Jan 2023 06:30) (107/74 - 143/78)  BP(mean): 82 (18 Jan 2023 20:30) (81 - 99)  RR: 18 (19 Jan 2023 06:30) (14 - 18)  SpO2: 99% (19 Jan 2023 06:30) (96% - 100%)    Parameters below as of 19 Jan 2023 06:30  Patient On (Oxygen Delivery Method): room air              On Neurological Examination:    Mental Status - Patient is alert, awake, oriented X3. echolalia psychomotor slowing difficulty with abstractions grasp reflex b/l    Cranial Nerves - PERRL, EOMI, VFF, V1-V3 intact, no gross facial asymmetry, tongue/uvula midline    Motor Exam -   uppers 4+/5 lowers distal atrophy on right left s/p fasciotomy  Sensory    Idecreased LLE  Coord: FTN intact bilaterally     Gait -  deferred                                                      RADIOLOGY  < from: MR Head w/wo IV Cont (12.29.22 @ 13:39) >  IMPRESSION:    Chronic infarcts of the right frontal subcortical and right centrum   semiovale. No new areas of infarct are identified. Unchanged scattered   foci of hemosiderin.    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   Differential diagnosis includes infection, sarcoidosis, lymphoma and   CLIPPERS (chronic lymphocytic inflammation with pontine perivascular   enhancement unresponsive to steroids).    --- End of Report ---    < end of copied text >  < from: MR Head w/wo IV Cont (12.29.22 @ 13:39) >  IMPRESSION:    Chronic infarcts of the right frontal subcortical and right centrum   semiovale. No new areas of infarct are identified. Unchanged scattered   foci of hemosiderin.    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   Differential diagnosis includes infection, sarcoidosis, lymphoma and   CLIPPERS (chronic lymphocytic inflammation with pontine perivascular   enhancement unresponsive to steroids).    --- End of Report ---    < end of copied text >

## 2023-01-19 NOTE — SPEECH LANGUAGE PATHOLOGY EVALUATION - SLP DIAGNOSIS
Mild-moderate expressive language deficits and functional receptive language skills marked by fluent output, adequate auditory comprehension, adequate repetition skills and word finding difficulties at the sentence/conversational level. Cognition is marked by disorganization during conversational speech and difficulty sequencing tasks (i.e. Steps for making a peanut better and jelly sandwich). Motor speech is marked by precise articulation, adequate rate and prosody. Vocal volume is appropriate

## 2023-01-19 NOTE — SWALLOW BEDSIDE ASSESSMENT ADULT - ASR SWALLOW RECOMMEND DIAG
Objective testing NOT warranted given no overt signs of penetration/aspiration and CT Chest does not indicate pneumonia.

## 2023-01-19 NOTE — PROGRESS NOTE ADULT - SUBJECTIVE AND OBJECTIVE BOX
Follow Up:      Inverval History/ROS:Patient is a 52y old  Male who presents with a chief complaint of anemia (19 Jan 2023 09:42)    taken to OR for fasciotomy due to compartment syndrome  Concern for necrosis of muscle.  No fever    Allergies    No Known Allergies    Intolerances        ANTIMICROBIALS:  piperacillin/tazobactam IVPB.. 3.375 every 8 hours      OTHER MEDS:  aspirin enteric coated 81 milliGRAM(s) Oral daily  atorvastatin 40 milliGRAM(s) Oral at bedtime  lacosamide IVPB 100 milliGRAM(s) IV Intermittent every 12 hours  levETIRAcetam  IVPB 1500 milliGRAM(s) IV Intermittent every 12 hours  oxyCODONE    IR 5 milliGRAM(s) Oral every 6 hours PRN  pantoprazole  Injectable 40 milliGRAM(s) IV Push daily  sodium chloride 0.9%. 1000 milliLiter(s) IV Continuous <Continuous>      Vital Signs Last 24 Hrs  T(C): 36.8 (19 Jan 2023 09:10), Max: 37.2 (18 Jan 2023 18:40)  T(F): 98.3 (19 Jan 2023 09:10), Max: 99 (18 Jan 2023 18:40)  HR: 100 (19 Jan 2023 09:10) (96 - 110)  BP: 117/50 (19 Jan 2023 09:10) (107/74 - 143/78)  BP(mean): 82 (18 Jan 2023 20:30) (81 - 99)  RR: 16 (19 Jan 2023 09:10) (14 - 18)  SpO2: 100% (19 Jan 2023 09:10) (96% - 100%)    Parameters below as of 19 Jan 2023 09:10  Patient On (Oxygen Delivery Method): room air        PHYSICAL EXAM:  General: x[ ] non-toxic  HEAD/EYES: [ ] PERRL [x ] white sclera [ ] icterus  ENT:  [ ] normal [x ] supple [ ] thrush [ ] pharyngeal exudate  Cardiovascular:   [ ] murmur [x] normal [ ] PPM/AICD  Respiratory:  x[ ] clear to ausculation bilaterally  GI:  [ x] soft, non-tender, normal bowel sounds  :  [ ] fish x[ ] no CVA tenderness   Musculoskeletal:  [ ] no synovitis  Neurologic:  [ ] non-focal exam   Skin:  [ x] no rash  Lymph: [ ] no lymphadenopathy  Psychiatric:  [ ] appropriate affect [x] alert & oriented  Lines:  [ x] no phlebitis [ ] central line                                5.6    21.52 )-----------( 227      ( 19 Jan 2023 09:30 )             17.5       01-19    134<L>  |  99  |  20  ----------------------------<  127<H>  4.8   |  22  |  0.81    Ca    8.3<L>      19 Jan 2023 05:46  Phos  4.5     01-19  Mg     2.10     01-19    TPro  6.0  /  Alb  2.8<L>  /  TBili  0.8  /  DBili  x   /  AST  97<H>  /  ALT  46<H>  /  AlkPhos  66  01-19      Urinalysis Basic - ( 18 Jan 2023 07:46 )    Color: Yellow / Appearance: Clear / SG: >1.050 / pH: x  Gluc: x / Ketone: Trace  / Bili: Negative / Urobili: <2 mg/dL   Blood: x / Protein: 30 mg/dL / Nitrite: Negative   Leuk Esterase: Moderate / RBC: 4 /HPF /  /HPF   Sq Epi: x / Non Sq Epi: 0 /HPF / Bacteria: Negative        MICROBIOLOGY:    RADIOLOGY:

## 2023-01-19 NOTE — SPEECH LANGUAGE PATHOLOGY EVALUATION - COMMENTS
1. 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 PE with psychomotor slowing, aphasia frontal reflexes bilaterally distal LE weakness here with AMS, found to have a DVT iun the LLE MRi on 12/29/22 showed chronic right frontalm centrum semiovale strokes and bilateral lentiform and pontine enhancemeent"    CT Angio Chest 1/18 "Central airways are patent. Unchanged 3 mm right middle lobe nodule (3, 292). Unchanged 2 mm left upper lobe nodule (3:149). The lungs are otherwise clear. No pleural effusion. No pneumothorax."    Of Note: Patient is known to this service as patient was seen during a recent admission for multiple clinical swallow assessments. During previous admission patient completed a Cinesophagram on 12/16 with recommendations of easy to chew and thin liquids (please see report). Patient last seen on 12/28 with recommendations of regular solids with thin liquids (please see note).    Patient seen at bedside this AM for an initial assessment of speech/language, at which time patient was alert. Patient noted with word finding difficulty during conversational speech (consistent with H&P report). Patient also seen for a swallow assessment at this time, with recommendations of regular solids with thin liquids (please see note).

## 2023-01-19 NOTE — PROGRESS NOTE ADULT - SUBJECTIVE AND OBJECTIVE BOX
POST-OPERATIVE NOTE    Patient is s/p L decompression fasciotomy of the lower extremity.    Subjective:  Patient reports no pain at the incisional site  Denies chest pain, shortness of breath, nausea, vomiting    Vital Signs Last 24 Hrs  T(C): 36.7 (19 Jan 2023 00:36), Max: 37.2 (18 Jan 2023 18:40)  T(F): 98 (19 Jan 2023 00:36), Max: 99 (18 Jan 2023 18:40)  HR: 102 (19 Jan 2023 00:36) (97 - 119)  BP: 124/79 (19 Jan 2023 00:36) (107/74 - 149/77)  BP(mean): 82 (18 Jan 2023 20:30) (81 - 99)  RR: 17 (19 Jan 2023 00:36) (14 - 18)  SpO2: 97% (19 Jan 2023 00:36) (96% - 100%)    Parameters below as of 19 Jan 2023 00:36  Patient On (Oxygen Delivery Method): room air        I&O's Detail    18 Jan 2023 07:01  -  19 Jan 2023 04:51  --------------------------------------------------------  IN:    sodium chloride 0.9%: 125 mL  Total IN: 125 mL    OUT:    Heparin Infusion: 0 mL  Total OUT: 0 mL    Total NET: 125 mL          Physical Exam:  General: NAD, resting comfortably in bed  Pulmonary: Nonlabored breathing, no respiratory distress  Abdominal: soft, appropriately tender, nondistended  Extremities: L lower extremity wrapped in ACE bandage, mildly soiled      LABS:                        7.1    23.68 )-----------( 244      ( 18 Jan 2023 11:00 )             21.5     01-18    131<L>  |  97<L>  |  15  ----------------------------<  143<H>  4.5   |  25  |  0.77    Ca    8.8      18 Jan 2023 00:47    TPro  6.8  /  Alb  3.1<L>  /  TBili  0.6  /  DBili  x   /  AST  91<H>  /  ALT  48<H>  /  AlkPhos  64  01-18    PT/INR - ( 18 Jan 2023 00:47 )   PT: 14.1 sec;   INR: 1.21 ratio         PTT - ( 18 Jan 2023 11:00 )  PTT:62.8 sec      Assessment:  The patient is a 52y Male who is now several hours post-op from a L decompression fasciotomy.    Plan:  - Will discuss heparin gtt/ ppx in the AM  - Rest of care per primary team

## 2023-01-19 NOTE — PROGRESS NOTE ADULT - SUBJECTIVE AND OBJECTIVE BOX
Patient is a 52y old  Male who presents with a chief complaint of AMS (18 Jan 2023 13:18)    Patient seen this morning. He reports feeling improvement in lower extremity pain s/p fasciotomy, however states he feels somewhat confused today, though he is A&O x 3 at this time. He has no other complaints. Denies chest pain, abdominal pain, hematuria, melena, hematochezia.     MEDICATIONS  (STANDING):  aspirin enteric coated 81 milliGRAM(s) Oral daily  atorvastatin 40 milliGRAM(s) Oral at bedtime  lacosamide IVPB 100 milliGRAM(s) IV Intermittent every 12 hours  levETIRAcetam  IVPB 1500 milliGRAM(s) IV Intermittent every 12 hours  pantoprazole  Injectable 40 milliGRAM(s) IV Push daily  piperacillin/tazobactam IVPB.. 3.375 Gram(s) IV Intermittent every 8 hours  sodium chloride 0.9%. 1000 milliLiter(s) (125 mL/Hr) IV Continuous <Continuous>    MEDICATIONS  (PRN):  oxyCODONE    IR 5 milliGRAM(s) Oral every 6 hours PRN Severe Pain (7 - 10)        Vital Signs Last 24 Hrs  T(C): 36.8 (19 Jan 2023 09:10), Max: 37.2 (18 Jan 2023 18:40)  T(F): 98.3 (19 Jan 2023 09:10), Max: 99 (18 Jan 2023 18:40)  HR: 100 (19 Jan 2023 09:10) (96 - 119)  BP: 117/50 (19 Jan 2023 09:10) (107/74 - 143/78)  BP(mean): 82 (18 Jan 2023 20:30) (81 - 99)  RR: 16 (19 Jan 2023 09:10) (14 - 18)  SpO2: 100% (19 Jan 2023 09:10) (96% - 100%)    Parameters below as of 19 Jan 2023 09:10  Patient On (Oxygen Delivery Method): room air        PE  NAD  A&O x 3   Anicteric, MMM  RRR  CTAB  Abd soft, NT, ND. +RLQ hematoma   +L medial ankle ecchymosis   No rash grossly                          5.6    21.52 )-----------( 227      ( 19 Jan 2023 09:30 )             17.5       01-19    134<L>  |  99  |  20  ----------------------------<  127<H>  4.8   |  22  |  0.81    Ca    8.3<L>      19 Jan 2023 05:46  Phos  4.5     01-19  Mg     2.10     01-19    TPro  6.0  /  Alb  2.8<L>  /  TBili  0.8  /  DBili  x   /  AST  97<H>  /  ALT  46<H>  /  AlkPhos  66  01-19       Patient is a 52y old  Male who presents with a chief complaint of AMS (18 Jan 2023 13:18)    Patient seen this morning, receiving blood transfusion. He reports feeling improvement in lower extremity pain s/p fasciotomy, however states he feels somewhat confused today, though he is A&O x 3 at this time. He has no other complaints. Denies chest pain, abdominal pain, hematuria, melena, hematochezia.     MEDICATIONS  (STANDING):  aspirin enteric coated 81 milliGRAM(s) Oral daily  atorvastatin 40 milliGRAM(s) Oral at bedtime  lacosamide IVPB 100 milliGRAM(s) IV Intermittent every 12 hours  levETIRAcetam  IVPB 1500 milliGRAM(s) IV Intermittent every 12 hours  pantoprazole  Injectable 40 milliGRAM(s) IV Push daily  piperacillin/tazobactam IVPB.. 3.375 Gram(s) IV Intermittent every 8 hours  sodium chloride 0.9%. 1000 milliLiter(s) (125 mL/Hr) IV Continuous <Continuous>    MEDICATIONS  (PRN):  oxyCODONE    IR 5 milliGRAM(s) Oral every 6 hours PRN Severe Pain (7 - 10)        Vital Signs Last 24 Hrs  T(C): 36.8 (19 Jan 2023 09:10), Max: 37.2 (18 Jan 2023 18:40)  T(F): 98.3 (19 Jan 2023 09:10), Max: 99 (18 Jan 2023 18:40)  HR: 100 (19 Jan 2023 09:10) (96 - 119)  BP: 117/50 (19 Jan 2023 09:10) (107/74 - 143/78)  BP(mean): 82 (18 Jan 2023 20:30) (81 - 99)  RR: 16 (19 Jan 2023 09:10) (14 - 18)  SpO2: 100% (19 Jan 2023 09:10) (96% - 100%)    Parameters below as of 19 Jan 2023 09:10  Patient On (Oxygen Delivery Method): room air        PE  NAD  A&O x 3   Anicteric, MMM  RRR  CTAB  Abd soft, NT, ND. +RLQ hematoma   +L medial ankle ecchymosis   No rash grossly                          5.6    21.52 )-----------( 227      ( 19 Jan 2023 09:30 )             17.5       01-19    134<L>  |  99  |  20  ----------------------------<  127<H>  4.8   |  22  |  0.81    Ca    8.3<L>      19 Jan 2023 05:46  Phos  4.5     01-19  Mg     2.10     01-19    TPro  6.0  /  Alb  2.8<L>  /  TBili  0.8  /  DBili  x   /  AST  97<H>  /  ALT  46<H>  /  AlkPhos  66  01-19

## 2023-01-19 NOTE — PROVIDER CONTACT NOTE (CRITICAL VALUE NOTIFICATION) - BACKGROUND
admitted for AMS
admitted for AMS
Pt admitted for AMS and compartment syndrome in the left lower extremity. Pt is s/p fasciotomy.

## 2023-01-19 NOTE — SWALLOW BEDSIDE ASSESSMENT ADULT - ADDITIONAL RECOMMENDATIONS
1. Monitor for any changes in neurological status that may impact oral intake. 2. This service to follow up as schedule permits for diet tolerance.

## 2023-01-19 NOTE — PROVIDER CONTACT NOTE (CRITICAL VALUE NOTIFICATION) - SITUATION
Pt Hemoglobin 6.6, Hematocrit 19.7. Pt asymptomatic and vital signs stable.
H&H 5.7/17.5
H&H of 5.6/17.5

## 2023-01-19 NOTE — PROGRESS NOTE ADULT - ASSESSMENT
Estiven Simeon is a 52-year-old  male presented with PMHx of TIAs (2011, 2013), CVAs x3 (2/2022, 8/3/2022, 11/5/2022) w/residual expressive aphasia on Eliquis, dyslipidemia and questionable ASD/PFO with a recent prolonged admission as outlined below representing to the hospital with slurred speech and AMS. GI consulted for further workup of occult anemia. Course c/b acute onset LLE DVT w/ compartment syndrome. 1/18 sp OR for LLE decompression fasciotomy.     #Occult Anemia- low suspicion for clinically significant gib  Patient presenting with AMS and slurred speech found of unclear etiology. Incidentally found to have a hgb of 7 from recent d/c of 13. Risk factors for bleeding include ASA and Enoxaparin although rectal exam with NO overt signs of bleeding on exam with no ongoing bleeding over the past week per pt and facility. Recent c-scope on during last admission with diverticulosis in the sigmoid colon, non-bleeding internal hemorrhoid in addition to one 15 mm benign-appearing with path showing TA. Exam is notable for a hematoma over the abdominal wall with extremely distended LLE  1/19- sp OR yesterday for LLE fasciotomy, w/ drop in hgb on AM labs- planned for 2U, no overt gib per dw nursing    Recommendations  -f/u post transfusion cbc  -trend cbc, transfuse for hgb >/= 7  -CTAP to r/o RP bleed when able  -PO PPI QD for gastroprotection   -maintain active type & screen and access with 2 x Large bore IV  -no present plans for endoscopic intervention at this time, please notify us if patient develops any overt signs of bleeding  -follow up further vascular surgery recommendations  -rest of care as per primary team      *all recommendations are tentative until note is attested by attending*    RENÉE Brewer PA-C, RD, CDN  available on TEAMS for questions    after 5pm/weekends, please contact the on-call GI fellow at [phone]  Estiven Simeon is a 52-year-old  male presented with PMHx of TIAs (2011, 2013), CVAs x3 (2/2022, 8/3/2022, 11/5/2022) w/residual expressive aphasia on Eliquis, dyslipidemia and questionable ASD/PFO with a recent prolonged admission as outlined below representing to the hospital with slurred speech and AMS. GI consulted for further workup of occult anemia. Course c/b acute onset LLE DVT w/ compartment syndrome. 1/18 sp OR for LLE decompression fasciotomy.     #Occult Anemia- low suspicion for clinically significant gib  #LLE compartment syndrome sp fasciotomy   #Leukocytosis  #AMS  Patient presenting with AMS and slurred speech found of unclear etiology. Incidentally found to have a hgb of 7 from recent d/c of 13. Risk factors for bleeding include ASA and Enoxaparin although rectal exam with NO overt signs of bleeding on exam with no ongoing bleeding over the past week per pt and facility. Recent c-scope on during last admission with diverticulosis in the sigmoid colon, non-bleeding internal hemorrhoid in addition to one 15 mm benign-appearing with path showing TA. Exam is notable for a hematoma over the abdominal wall with extremely distended LLE  1/19- sp OR yesterday for LLE fasciotomy, w/ drop in hgb on AM labs- planned for 2U, no overt gib per  nursing    Recommendations  -f/u post transfusion cbc  -trend cbc, transfuse for hgb >/= 7  -maintain active t&s, 2 large bore IVs  -CTAP to r/o RP bleed when able  -PO PPI QD for gastroprotection   -trend LFTs, INR  -no present plans for endoscopic intervention at this time, please notify us if patient develops any signs of overt gi bleeding  -follow up further vascular surgery recommendations  -rest of care as per primary team      *all recommendations are tentative until note is attested by attending*    RENÉE Brewer PA-C, RD, CDN  available on TEAMS for questions    after 5pm/weekends, please contact the on-call GI fellow at [phone]  Estiven Simeon is a 52-year-old  male presented with PMHx of TIAs (2011, 2013), CVAs x3 (2/2022, 8/3/2022, 11/5/2022) w/residual expressive aphasia on Eliquis, dyslipidemia and questionable ASD/PFO with a recent prolonged admission as outlined below representing to the hospital with slurred speech and AMS. GI consulted for further workup of occult anemia. Course c/b acute onset LLE DVT w/ compartment syndrome. 1/18 sp OR for LLE decompression fasciotomy.     #Occult Anemia- low suspicion for clinically significant gib  #LLE compartment syndrome sp fasciotomy   #Leukocytosis  #AMS  Patient presenting with AMS and slurred speech found of unclear etiology. Incidentally found to have a hgb of 7 from recent d/c of 13. Risk factors for bleeding include ASA and Enoxaparin although rectal exam with NO overt signs of bleeding on exam with no ongoing bleeding over the past week per pt and facility. Recent c-scope on during last admission with diverticulosis in the sigmoid colon, non-bleeding internal hemorrhoid in addition to one 15 mm benign-appearing with path showing TA. Exam is notable for a hematoma over the abdominal wall with extremely distended LLE  1/19- sp OR yesterday for LLE fasciotomy, w/ drop in hgb on AM labs- planned for 2U, no overt gib per  nursing    Recommendations  -f/u post transfusion cbc  -trend cbc, transfuse for hgb >/= 7  -maintain active t&s, 2 large bore IVs  -CTAP to r/o RP bleed when able  -PPI QD for gastroprotection   -trend LFTs, INR  -no present plans for endoscopic intervention at this time, please notify us if patient develops any signs of overt gi bleeding  -follow up further vascular surgery recommendations  -rest of care as per primary team      *all recommendations are tentative until note is attested by attending*    RENÉE Brewer PA-C, RD, CDN  available on TEAMS for questions    after 5pm/weekends, please contact the on-call GI fellow at [phone]  Estiven Simeon is a 52-year-old  male presented with PMHx of TIAs (2011, 2013), CVAs x3 (2/2022, 8/3/2022, 11/5/2022) w/residual expressive aphasia on Eliquis, dyslipidemia and questionable ASD/PFO with a recent prolonged admission as outlined below representing to the hospital with slurred speech and AMS. GI consulted for further workup of occult anemia. Course c/b acute onset LLE DVT w/ compartment syndrome. 1/18 sp OR for LLE decompression fasciotomy.     #Occult Anemia- low suspicion for clinically significant gib, ob neg  #LLE compartment syndrome sp fasciotomy   #Leukocytosis  #AMS  Patient presenting with AMS and slurred speech found of unclear etiology. Incidentally found to have a hgb of 7 from recent d/c of 13. Risk factors for bleeding include ASA and Enoxaparin although rectal exam with NO overt signs of bleeding on exam with no ongoing bleeding over the past week per pt and facility. Recent c-scope on during last admission with diverticulosis in the sigmoid colon, non-bleeding internal hemorrhoid in addition to one 15 mm benign-appearing with path showing TA. Exam is notable for a hematoma over the abdominal wall with extremely distended LLE  1/19- sp OR yesterday for LLE fasciotomy, w/ drop in hgb on AM labs- planned for 2U, no overt gib per  nursing    Recommendations  -f/u post transfusion cbc  -trend cbc, transfuse for hgb >/= 7  -maintain active t&s, 2 large bore IVs  -CTAP to r/o RP bleed when able  -PPI QD for gastroprotection   -trend LFTs, INR  -no present plans for endoscopic intervention at this time, please notify us if patient develops any signs of overt gi bleeding  -follow up further vascular surgery recommendations  -rest of care as per primary team      *all recommendations are tentative until note is attested by attending*    RENÉE Brewer PA-C, RD, CDN  available on TEAMS for questions    after 5pm/weekends, please contact the on-call GI fellow at [phone]  Estiven Simeon is a 52-year-old  male presented with PMHx of TIAs (2011, 2013), CVAs x3 (2/2022, 8/3/2022, 11/5/2022) w/residual expressive aphasia on Eliquis, dyslipidemia and questionable ASD/PFO with a recent prolonged admission as outlined below representing to the hospital with slurred speech and AMS. GI consulted for further workup of occult anemia. Course c/b acute onset LLE DVT w/ compartment syndrome. 1/18 sp OR for LLE decompression fasciotomy.     #Occult Anemia- low suspicion for clinically significant gib, ob neg  #LLE compartment syndrome sp fasciotomy   #Leukocytosis  #AMS  Patient presenting with AMS and slurred speech found of unclear etiology. Incidentally found to have a hgb of 7 from recent d/c of 13. Risk factors for bleeding include ASA and Enoxaparin although rectal exam with NO overt signs of bleeding on exam with no ongoing bleeding over the past week per pt and facility. Recent c-scope on during last admission with diverticulosis in the sigmoid colon, non-bleeding internal hemorrhoid in addition to one 15 mm benign-appearing with path showing TA. Exam is notable for a hematoma over the abdominal wall with extremely distended LLE  1/19- sp OR yesterday for LLE fasciotomy, w/ drop in hgb on AM labs- planned for 2U, no overt gib per  nursing    Recommendations  -f/u post transfusion cbc  -trend cbc, transfuse for hgb >/= 7  -maintain active t&s, 2 large bore IVs  -CTAP to r/o RP bleed when able  -can increase PPI to BID for gastroprotection   -trend LFTs, INR  -no present plans for endoscopic intervention at this time, please notify us if patient develops any signs of overt gi bleeding  -follow up further vascular surgery recommendations  -rest of care as per primary team      *all recommendations are tentative until note is attested by attending*    RENÉE Brewer PA-C, RD, CDN  available on TEAMS for questions    after 5pm/weekends, please contact the on-call GI fellow at [phone]

## 2023-01-19 NOTE — PROGRESS NOTE ADULT - NS ATTEND AMEND GEN_ALL_CORE FT
53 y/o male admitted with severe anemia; known right lower quadrant hematoma. Continue workup to rule out any acute bleeding. Otherwise no evidence of malignancy at this time. Possible biopsy by neurosurgery.

## 2023-01-19 NOTE — PROVIDER CONTACT NOTE (CRITICAL VALUE NOTIFICATION) - ASSESSMENT
Pt Hemoglobin 6.6, Hematocrit 19.7. Pt asymptomatic and vital signs stable.
Pt A&O X2, confused at times. Skin pale, warm to touch, +2 radial pulses B/L and right dorsalis pedis present. Denies any chest pain, sob, lightheadedness, or dizziness at this time. No neuro deficits noted
Pt A&O X2, confused at times. Skin pale, warm to touch, no acute distress noted

## 2023-01-19 NOTE — PROGRESS NOTE ADULT - SUBJECTIVE AND OBJECTIVE BOX
Name of Patient : TAMI DUNHAM  MRN: [id number]  Date of visit: 01-19-23       Subjective: Patient seen and examined. No new events except as noted.   awake, calm   S/P OR< fasciotomy       REVIEW OF SYSTEMS:    CONSTITUTIONAL: No weakness, fevers or chills  EYES/ENT: No visual changes;  No vertigo or throat pain   NECK: No pain or stiffness  RESPIRATORY: No cough, wheezing, hemoptysis; No shortness of breath  CARDIOVASCULAR: No chest pain or palpitations  GASTROINTESTINAL: No abdominal or epigastric pain. No nausea, vomiting, or hematemesis; No diarrhea or constipation. No melena or hematochezia.  GENITOURINARY: No dysuria, frequency or hematuria  NEUROLOGICAL: No numbness or weakness  SKIN: No itching, burning, rashes, or lesions   All other review of systems is negative unless indicated above.    MEDICATIONS:  MEDICATIONS  (STANDING):  aspirin enteric coated 81 milliGRAM(s) Oral daily  atorvastatin 40 milliGRAM(s) Oral at bedtime  lacosamide IVPB 100 milliGRAM(s) IV Intermittent every 12 hours  levETIRAcetam  IVPB 1500 milliGRAM(s) IV Intermittent every 12 hours  pantoprazole  Injectable 40 milliGRAM(s) IV Push daily  piperacillin/tazobactam IVPB.. 3.375 Gram(s) IV Intermittent every 8 hours  sodium chloride 0.9%. 1000 milliLiter(s) (125 mL/Hr) IV Continuous <Continuous>      PHYSICAL EXAM:  T(C): 37.2 (01-19-23 @ 18:44), Max: 37.2 (01-19-23 @ 10:55)  HR: 100 (01-19-23 @ 18:44) (93 - 106)  BP: 120/62 (01-19-23 @ 18:44) (109/62 - 130/58)  RR: 18 (01-19-23 @ 18:44) (15 - 18)  SpO2: 100% (01-19-23 @ 18:44) (97% - 100%)  Wt(kg): --  I&O's Summary    18 Jan 2023 07:01  -  19 Jan 2023 07:00  --------------------------------------------------------  IN: 125 mL / OUT: 0 mL / NET: 125 mL    19 Jan 2023 07:01  -  19 Jan 2023 20:37  --------------------------------------------------------  IN: 0 mL / OUT: 650 mL / NET: -650 mL          Appearance: awake, slow speech, mild confused   HEENT:  PERRLA   Lymphatic: No lymphadenopathy   Cardiovascular: Normal S1 S2, no JVD  Respiratory: normal effort , clear  Gastrointestinal:  Soft, Non-tender  Skin: No rashes,  warm to touch  Psychiatry:  Mood & affect appropriate  Musculuskeletal: L LE dressing       All labs, Imaging and EKGs personally reviewed     01-18-23 @ 07:01  -  01-19-23 @ 07:00  --------------------------------------------------------  IN: 125 mL / OUT: 0 mL / NET: 125 mL    01-19-23 @ 07:01 - 01-19-23 @ 20:37  --------------------------------------------------------  IN: 0 mL / OUT: 650 mL / NET: -650 mL                          5.6    21.52 )-----------( 227      ( 19 Jan 2023 09:30 )             17.5               01-19    134<L>  |  99  |  20  ----------------------------<  127<H>  4.8   |  22  |  0.81    Ca    8.3<L>      19 Jan 2023 05:46  Phos  4.5     01-19  Mg     2.10     01-19    TPro  6.0  /  Alb  2.8<L>  /  TBili  0.8  /  DBili  x   /  AST  97<H>  /  ALT  46<H>  /  AlkPhos  66  01-19    PT/INR - ( 18 Jan 2023 00:47 )   PT: 14.1 sec;   INR: 1.21 ratio         PTT - ( 18 Jan 2023 11:00 )  PTT:62.8 sec                   Urinalysis Basic - ( 18 Jan 2023 07:46 )    Color: Yellow / Appearance: Clear / SG: >1.050 / pH: x  Gluc: x / Ketone: Trace  / Bili: Negative / Urobili: <2 mg/dL   Blood: x / Protein: 30 mg/dL / Nitrite: Negative   Leuk Esterase: Moderate / RBC: 4 /HPF /  /HPF   Sq Epi: x / Non Sq Epi: 0 /HPF / Bacteria: Negative

## 2023-01-19 NOTE — PROGRESS NOTE ADULT - ASSESSMENT
This is a 52 year old male who presents with altered mental status.     1. Anemia   -- Hg declining, 5.6 today. During previous admission, hg remained 11-12.   -- Occult blood negative. Labs show some iron deficiency but elevated ferritin. No evidence of B12/folate deficiency or hemolysis.   -- GI consulted, low suspicion for GI bleed. Pt had colonoscopy last admission w/o bleeding.   -- Unclear etiology. Pt with RLQ hematoma. Follow up CT a/p to r/o RP bleed   -- Plan for 2 units pRBC today. Transfuse to maintain hg >7    2. r/o malignancy   -- Prior admission had MRI with abnormal findings, possible malignancy. LP w/o malignant cells. Colonoscopy with biopsy of polyp lesion showing tubular adenoma. Pelvic LN biopsy requested however risks outweighed benefits.   -- CEA, , AFP wnl. PSA mildly elevated at 5.24.  -- Outpatient follow-up to discuss need for PET/CT     3. DVT   -- US LE shows new acute LLE DVT compared to recent imaging on 12/31 despite pt on Lovenox  -- Unclear if he was compliant with anticoagulation   -- AT III, protein C/S, Lupus profile negative. Follow up aCL ab, b2GP ab, factor V Leiden, prothrombin gene mt  -- AC on hold for anemia. will need to consider IVC filter if unable to anticoagulate   -- s/p emergent fasciotomy for compartment syndrome     4. AMS   -- per neurology  -- Follow up repeat MRI     Will continue to follow.    Caro Dow PA-C  Hematology/Oncology  [facility]  [phone] (office)  [phone] (alt office)  Evenings and weekends please call MD on call or office

## 2023-01-19 NOTE — PROGRESS NOTE ADULT - ASSESSMENT
Assessment:  52M w hx of multiple CVA in last year with residual deficit of word finding difficulty, recent admission for unresponsiveness [facility] seizure activity @ OSH, who presents with AMS today from nursing facility, found to have acute onset LLE DVT L pop v, L PT v, and L gastroc v cb compartment syndrome of LLE. S/p left 4 quadrant decompression fasciotomy, found to have non-viable lateral and posterior compartment muscles.     Plan:  - Added on for knee disarticulation today  - Consent to be obtained  - Continue ASA  - NPO/IVF  - Abx per primary team     C Team Surgery  #[id number]      Assessment:  52M w hx of multiple CVA in last year with residual deficit of word finding difficulty, recent admission for unresponsiveness [facility] seizure activity @ OSH, who presents with AMS today from nursing facility, found to have acute onset LLE DVT L pop v, L PT v, and L gastroc v cb compartment syndrome of LLE. S/p left 4 quadrant decompression fasciotomy.    Plan:  - No plan for OR today, will continue daily dressing changes  - Pt. may have diet from vascular perspective  - Recommend transfusion/holding full AC in setting of anemia   - Abx per primary team     C Team Surgery  #[id number]

## 2023-01-19 NOTE — PROGRESS NOTE ADULT - SUBJECTIVE AND OBJECTIVE BOX
Interval Events: sp OR for L decompression fasciotomy. mildly tachy ON. pt seen and examined. c/o L knee pain. denies n/v/abd pain. no bm. no overt gib per dw nursing. drop in hgb on am labs, planned for 2 units      Allergies:  No Known Allergies      Hospital Medications:  aspirin enteric coated 81 milliGRAM(s) Oral daily  atorvastatin 40 milliGRAM(s) Oral at bedtime  lacosamide IVPB 100 milliGRAM(s) IV Intermittent every 12 hours  levETIRAcetam  IVPB 1500 milliGRAM(s) IV Intermittent every 12 hours  oxyCODONE    IR 5 milliGRAM(s) Oral every 6 hours PRN  pantoprazole  Injectable 40 milliGRAM(s) IV Push daily  piperacillin/tazobactam IVPB.. 3.375 Gram(s) IV Intermittent every 8 hours  sodium chloride 0.9%. 1000 milliLiter(s) IV Continuous <Continuous>      ROS: As per HPI, otherwise 10-point ROS reviewed and negative.    Vital Signs:  Vital Signs Last 24 Hrs  T(C): 36.7 (19 Jan 2023 06:30), Max: 37.2 (18 Jan 2023 18:40)  T(F): 98.1 (19 Jan 2023 06:30), Max: 99 (18 Jan 2023 18:40)  HR: 96 (19 Jan 2023 06:30) (96 - 119)  BP: 115/72 (19 Jan 2023 06:30) (107/74 - 143/78)  BP(mean): 82 (18 Jan 2023 20:30) (81 - 99)  RR: 18 (19 Jan 2023 06:30) (14 - 18)  SpO2: 99% (19 Jan 2023 06:30) (96% - 100%)    Parameters below as of 19 Jan 2023 06:30  Patient On (Oxygen Delivery Method): room air      Daily Height in cm: 190.5 (18 Jan 2023 16:07)    Daily       01-18-23 @ 07:01  -  01-19-23 @ 07:00  --------------------------------------------------------  IN: 125 mL / OUT: 0 mL / NET: 125 mL        LABS:                        5.7    23.91 )-----------( 228      ( 19 Jan 2023 05:46 )             17.5     Mean Cell Volume: 93.1 fL (01-19-23 @ 05:46)    01-19    134<L>  |  99  |  20  ----------------------------<  127<H>  4.8   |  22  |  0.81    Ca    8.3<L>      19 Jan 2023 05:46  Phos  4.5     01-19  Mg     2.10     01-19    TPro  6.0  /  Alb  2.8<L>  /  TBili  0.8  /  DBili  x   /  AST  97<H>  /  ALT  46<H>  /  AlkPhos  66  01-19    LIVER FUNCTIONS - ( 19 Jan 2023 05:46 )  Alb: 2.8 g/dL / Pro: 6.0 g/dL / ALK PHOS: 66 U/L / ALT: 46 U/L / AST: 97 U/L / GGT: x           PT/INR - ( 18 Jan 2023 00:47 )   PT: 14.1 sec;   INR: 1.21 ratio         PTT - ( 18 Jan 2023 11:00 )  PTT:62.8 sec  Urinalysis Basic - ( 18 Jan 2023 07:46 )    Color: Yellow / Appearance: Clear / SG: >1.050 / pH: x  Gluc: x / Ketone: Trace  / Bili: Negative / Urobili: <2 mg/dL   Blood: x / Protein: 30 mg/dL / Nitrite: Negative   Leuk Esterase: Moderate / RBC: 4 /HPF /  /HPF   Sq Epi: x / Non Sq Epi: 0 /HPF / Bacteria: Negative                              5.7    23.91 )-----------( 228      ( 19 Jan 2023 05:46 )             17.5                         7.1    23.68 )-----------( 244      ( 18 Jan 2023 11:00 )             21.5                         7.3    20.30 )-----------( 206      ( 18 Jan 2023 00:47 )             22.2       PHYSICAL EXAM  GENERAL: lying in bed, pale, chronically ill appearing, in no apparent distress  HEENT: NCAT, oropharynx clear, no mucosal ulcerations appreciated  EYES: anicteric sclerae, moist conjunctivae  NECK: trachea midline, FROM  CHEST:  respirations even, non labored  ABDOMEN:  softly dt, non-tender, no RT/no guarding, rlq w/ outlined area of hematoma  EXTREMITIES: LLE swollen in ace wrap, mild serosang oozing on towel  SKIN:  warm/dry, no visible rash appreciated  PSYCH: awake responsive but intermittently dozes off to sleep  NEURO: no tremor noted     Imaging:  ctap pending

## 2023-01-19 NOTE — SWALLOW BEDSIDE ASSESSMENT ADULT - COMMENTS
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 PE with psychomotor slowing, aphasia frontal reflexes bilaterally distal LE weakness here with AMS, found to have a DVT iun the LLE MRi on 12/29/22 showed chronic right frontalm centrum semiovale strokes and bilateral lentiform and pontine enhancemeent"    CT Angio Chest 1/18 "Central airways are patent. Unchanged 3 mm right middle lobe nodule (3, 292). Unchanged 2 mm left upper lobe nodule (3:149). The lungs are otherwise clear. No pleural effusion. No pneumothorax."    Of Note: Patient is known to this service as patient was seen during a recent admission for multiple clinical swallow assessments. During previous admission patient completed a Cinesophagram on 12/16 with recommendations of easy to chew and thin liquids (please see report). Patient last seen on 12/28 with recommendations of regular solids with thin liquids (please see note).    Patient seen at bedside this AM for an initial assessment of the swallow function, at which time patient was alert. Patient noted with word finding difficulty during conversational speech (consistent with H&P report), therefore a speech/language assessment completed at this time (please see note).

## 2023-01-19 NOTE — PROGRESS NOTE ADULT - ASSESSMENT
52 year old with recent admission for change in MS , presents with change in MS and associated leukocytosis.  Found to have acute dvt of the left leg with tender/ distended leg      1) leukocytosis  nonspecific finding    ? reactive to acute dvt    Check blood cultures times two    Hemarthrosis during prior admission./ Now with DVT.  Hematology follow up       2) Left leg pain/ Leg with dvt  S/p fasciotomy for compartment syndrome  Concern for muscle necrosis  Can continue short course of zosyn while viability of muscle declares itself      3) abnormal imaging  Prior abnormal brain imaging  Ct with nonspecific lad    Consider repeat CT CAP    4) Change in MS  neurology follow up

## 2023-01-19 NOTE — PROGRESS NOTE ADULT - ASSESSMENT
Patient is a 52-year-old male with a past medical history of CVA/TIA, ASD/PFO?, hyperlipidemia presents emerged department today with altered mental status from his nursing facility.  Patient just had a stay in this hospital when he was found unresponsive at home.  An extensive work-up with no significant etiology found.  There were thoughts that it was related to seizure activity however neurology did not find any seizure activity on their work-up.  Patient went to a nursing facility and rehab.  Was doing well until yesterday his parents noted him to be somewhat confused and having word finding difficulties.  Today his speech was worse when noticed by the wife.  He is also noted to have odd behavior where he was pulling at sheets and the strings of the masks.  He did not have this behavior before.  Only residual deficit from his previous strokes with some random word finding abilities however his speech today is reported to be significantly worse with worse word finding.  The patient denies any pain at this time.  He does acknowledge that he is having trouble speaking.  There is also report from the family and patient is complaining of left calf pain.  This pain is much more severe than it has been in the past.    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, SP LP, no malignancy on cyto   tele monitoring   Plan for MRI per Neuro recs, ordered    # Lekucytosis  Pan CX   Infectious W/U   CTA negative   ID eval called   Hematology eval appreciated, follow up recs     #  Compartment syndrome   S/P Fasciotomy   Vascular follow up appreciated   dressing per vascular follow up recs   ID eval appreciated, cont zosyn,       # ANemia  Occult negative  2 units PRBC  serial CBC   GI follow up appreciated

## 2023-01-19 NOTE — PROVIDER CONTACT NOTE (CRITICAL VALUE NOTIFICATION) - ACTION/TREATMENT ORDERED:
Provider notified, no new orders given. Provider notified, and ordered one unit of packed red blood cells.

## 2023-01-20 LAB
ALBUMIN SERPL ELPH-MCNC: 2.5 G/DL — LOW (ref 3.3–5)
ALP SERPL-CCNC: 85 U/L — SIGNIFICANT CHANGE UP (ref 40–120)
ALT FLD-CCNC: 49 U/L — HIGH (ref 4–41)
ANION GAP SERPL CALC-SCNC: 10 MMOL/L — SIGNIFICANT CHANGE UP (ref 7–14)
AST SERPL-CCNC: 72 U/L — HIGH (ref 4–40)
BILIRUB SERPL-MCNC: 0.9 MG/DL — SIGNIFICANT CHANGE UP (ref 0.2–1.2)
BUN SERPL-MCNC: 14 MG/DL — SIGNIFICANT CHANGE UP (ref 7–23)
CALCIUM SERPL-MCNC: 7.8 MG/DL — LOW (ref 8.4–10.5)
CHLORIDE SERPL-SCNC: 102 MMOL/L — SIGNIFICANT CHANGE UP (ref 98–107)
CO2 SERPL-SCNC: 25 MMOL/L — SIGNIFICANT CHANGE UP (ref 22–31)
CREAT SERPL-MCNC: 0.71 MG/DL — SIGNIFICANT CHANGE UP (ref 0.5–1.3)
EGFR: 110 ML/MIN/1.73M2 — SIGNIFICANT CHANGE UP
GLUCOSE SERPL-MCNC: 104 MG/DL — HIGH (ref 70–99)
HCT VFR BLD CALC: 22.3 % — LOW (ref 39–50)
HCT VFR BLD CALC: 24.8 % — LOW (ref 39–50)
HGB BLD-MCNC: 7.3 G/DL — LOW (ref 13–17)
HGB BLD-MCNC: 8.4 G/DL — LOW (ref 13–17)
MAGNESIUM SERPL-MCNC: 2 MG/DL — SIGNIFICANT CHANGE UP (ref 1.6–2.6)
MCHC RBC-ENTMCNC: 29 PG — SIGNIFICANT CHANGE UP (ref 27–34)
MCHC RBC-ENTMCNC: 29.6 PG — SIGNIFICANT CHANGE UP (ref 27–34)
MCHC RBC-ENTMCNC: 32.7 GM/DL — SIGNIFICANT CHANGE UP (ref 32–36)
MCHC RBC-ENTMCNC: 33.9 GM/DL — SIGNIFICANT CHANGE UP (ref 32–36)
MCV RBC AUTO: 87.3 FL — SIGNIFICANT CHANGE UP (ref 80–100)
MCV RBC AUTO: 88.5 FL — SIGNIFICANT CHANGE UP (ref 80–100)
NRBC # BLD: 0 /100 WBCS — SIGNIFICANT CHANGE UP (ref 0–0)
NRBC # BLD: 0 /100 WBCS — SIGNIFICANT CHANGE UP (ref 0–0)
NRBC # FLD: 0 K/UL — SIGNIFICANT CHANGE UP (ref 0–0)
NRBC # FLD: 0.02 K/UL — HIGH (ref 0–0)
PLATELET # BLD AUTO: 225 K/UL — SIGNIFICANT CHANGE UP (ref 150–400)
PLATELET # BLD AUTO: 251 K/UL — SIGNIFICANT CHANGE UP (ref 150–400)
POTASSIUM SERPL-MCNC: 3.7 MMOL/L — SIGNIFICANT CHANGE UP (ref 3.5–5.3)
POTASSIUM SERPL-SCNC: 3.7 MMOL/L — SIGNIFICANT CHANGE UP (ref 3.5–5.3)
PROT SERPL-MCNC: 5.5 G/DL — LOW (ref 6–8.3)
RBC # BLD: 2.52 M/UL — LOW (ref 4.2–5.8)
RBC # BLD: 2.84 M/UL — LOW (ref 4.2–5.8)
RBC # FLD: 16.9 % — HIGH (ref 10.3–14.5)
RBC # FLD: 17.2 % — HIGH (ref 10.3–14.5)
SODIUM SERPL-SCNC: 137 MMOL/L — SIGNIFICANT CHANGE UP (ref 135–145)
WBC # BLD: 13.5 K/UL — HIGH (ref 3.8–10.5)
WBC # BLD: 15.05 K/UL — HIGH (ref 3.8–10.5)
WBC # FLD AUTO: 13.5 K/UL — HIGH (ref 3.8–10.5)
WBC # FLD AUTO: 15.05 K/UL — HIGH (ref 3.8–10.5)

## 2023-01-20 PROCEDURE — 99232 SBSQ HOSP IP/OBS MODERATE 35: CPT

## 2023-01-20 RX ORDER — OXYCODONE HYDROCHLORIDE 5 MG/1
5 TABLET ORAL ONCE
Refills: 0 | Status: DISCONTINUED | OUTPATIENT
Start: 2023-01-20 | End: 2023-01-20

## 2023-01-20 RX ORDER — PANTOPRAZOLE SODIUM 20 MG/1
40 TABLET, DELAYED RELEASE ORAL
Refills: 0 | Status: DISCONTINUED | OUTPATIENT
Start: 2023-01-20 | End: 2023-02-06

## 2023-01-20 RX ADMIN — PANTOPRAZOLE SODIUM 40 MILLIGRAM(S): 20 TABLET, DELAYED RELEASE ORAL at 12:20

## 2023-01-20 RX ADMIN — PIPERACILLIN AND TAZOBACTAM 25 GRAM(S): 4; .5 INJECTION, POWDER, LYOPHILIZED, FOR SOLUTION INTRAVENOUS at 14:58

## 2023-01-20 RX ADMIN — OXYCODONE HYDROCHLORIDE 5 MILLIGRAM(S): 5 TABLET ORAL at 08:59

## 2023-01-20 RX ADMIN — SODIUM CHLORIDE 125 MILLILITER(S): 9 INJECTION INTRAMUSCULAR; INTRAVENOUS; SUBCUTANEOUS at 11:01

## 2023-01-20 RX ADMIN — LEVETIRACETAM 400 MILLIGRAM(S): 250 TABLET, FILM COATED ORAL at 05:05

## 2023-01-20 RX ADMIN — ATORVASTATIN CALCIUM 40 MILLIGRAM(S): 80 TABLET, FILM COATED ORAL at 21:57

## 2023-01-20 RX ADMIN — OXYCODONE HYDROCHLORIDE 5 MILLIGRAM(S): 5 TABLET ORAL at 16:19

## 2023-01-20 RX ADMIN — OXYCODONE HYDROCHLORIDE 5 MILLIGRAM(S): 5 TABLET ORAL at 15:19

## 2023-01-20 RX ADMIN — LACOSAMIDE 120 MILLIGRAM(S): 50 TABLET ORAL at 18:11

## 2023-01-20 RX ADMIN — PIPERACILLIN AND TAZOBACTAM 25 GRAM(S): 4; .5 INJECTION, POWDER, LYOPHILIZED, FOR SOLUTION INTRAVENOUS at 21:57

## 2023-01-20 RX ADMIN — OXYCODONE HYDROCHLORIDE 5 MILLIGRAM(S): 5 TABLET ORAL at 10:00

## 2023-01-20 RX ADMIN — Medication 81 MILLIGRAM(S): at 12:20

## 2023-01-20 RX ADMIN — OXYCODONE HYDROCHLORIDE 5 MILLIGRAM(S): 5 TABLET ORAL at 02:43

## 2023-01-20 RX ADMIN — OXYCODONE HYDROCHLORIDE 5 MILLIGRAM(S): 5 TABLET ORAL at 02:13

## 2023-01-20 RX ADMIN — LEVETIRACETAM 400 MILLIGRAM(S): 250 TABLET, FILM COATED ORAL at 18:12

## 2023-01-20 RX ADMIN — PANTOPRAZOLE SODIUM 40 MILLIGRAM(S): 20 TABLET, DELAYED RELEASE ORAL at 18:11

## 2023-01-20 RX ADMIN — LACOSAMIDE 120 MILLIGRAM(S): 50 TABLET ORAL at 05:10

## 2023-01-20 RX ADMIN — PIPERACILLIN AND TAZOBACTAM 25 GRAM(S): 4; .5 INJECTION, POWDER, LYOPHILIZED, FOR SOLUTION INTRAVENOUS at 05:55

## 2023-01-20 NOTE — PROGRESS NOTE ADULT - SUBJECTIVE AND OBJECTIVE BOX
Interval Events: pt seen and examined. resting in bed, more awake alert. denies n/v/abd pain. no bms. no overt gib per dw nursing. sp 3u prbc with inadequate response. ctap neg for rph      Allergies:  No Known Allergies      Hospital Medications:  acetaminophen     Tablet .. 650 milliGRAM(s) Oral every 6 hours PRN  aspirin enteric coated 81 milliGRAM(s) Oral daily  atorvastatin 40 milliGRAM(s) Oral at bedtime  lacosamide IVPB 100 milliGRAM(s) IV Intermittent every 12 hours  levETIRAcetam  IVPB 1500 milliGRAM(s) IV Intermittent every 12 hours  oxyCODONE    IR 5 milliGRAM(s) Oral every 6 hours PRN  pantoprazole  Injectable 40 milliGRAM(s) IV Push daily  piperacillin/tazobactam IVPB.. 3.375 Gram(s) IV Intermittent every 8 hours  sodium chloride 0.9%. 1000 milliLiter(s) IV Continuous <Continuous>      ROS: As per HPI, otherwise 10-point ROS reviewed and negative.    Vital Signs:  Vital Signs Last 24 Hrs  T(C): 36.9 (20 Jan 2023 04:55), Max: 37.2 (19 Jan 2023 10:55)  T(F): 98.4 (20 Jan 2023 04:55), Max: 99 (19 Jan 2023 18:44)  HR: 89 (20 Jan 2023 04:55) (89 - 100)  BP: 105/64 (20 Jan 2023 04:55) (105/64 - 130/58)  BP(mean): --  RR: 18 (20 Jan 2023 04:55) (16 - 18)  SpO2: 99% (20 Jan 2023 04:55) (97% - 100%)    Parameters below as of 20 Jan 2023 04:55  Patient On (Oxygen Delivery Method): room air      Daily     Daily       01-19-23 @ 07:01  -  01-20-23 @ 07:00  --------------------------------------------------------  IN: 0 mL / OUT: 650 mL / NET: -650 mL        LABS:                        7.3    13.50 )-----------( 225      ( 20 Jan 2023 04:45 )             22.3     Mean Cell Volume: 88.5 fL (01-20-23 @ 04:45)    01-20    137  |  102  |  14  ----------------------------<  104<H>  3.7   |  25  |  0.71    Ca    7.8<L>      20 Jan 2023 04:45  Phos  4.5     01-19  Mg     2.00     01-20    TPro  5.5<L>  /  Alb  2.5<L>  /  TBili  0.9  /  DBili  x   /  AST  72<H>  /  ALT  49<H>  /  AlkPhos  85  01-20    LIVER FUNCTIONS - ( 20 Jan 2023 04:45 )  Alb: 2.5 g/dL / Pro: 5.5 g/dL / ALK PHOS: 85 U/L / ALT: 49 U/L / AST: 72 U/L / GGT: x           PTT - ( 18 Jan 2023 11:00 )  PTT:62.8 sec                            7.3    13.50 )-----------( 225      ( 20 Jan 2023 04:45 )             22.3                         6.6    15.15 )-----------( 216      ( 19 Jan 2023 20:45 )             19.7                         5.6    21.52 )-----------( 227      ( 19 Jan 2023 09:30 )             17.5                         5.7    23.91 )-----------( 228      ( 19 Jan 2023 05:46 )             17.5                         7.1    23.68 )-----------( 244      ( 18 Jan 2023 11:00 )             21.5       PHYSICAL EXAM  GENERAL: lying in bed, chronically ill appearing, in no apparent distress  HEENT: NCAT, oropharynx clear, no mucosal ulcerations appreciated  EYES: anicteric sclerae, moist conjunctivae  NECK: trachea midline, FROM  CHEST:  respirations even, non labored  ABDOMEN:  softly dt, non-tender, no RT/no guarding, rlq w/ outlined area of hematoma (remains w/in outline)  EXTREMITIES: LLE swollen in ace wrap  SKIN:  warm/dry, no visible rash appreciated  PSYCH: awake alert responsive, some confusion  NEURO: no tremor noted     Imaging:  ACC: [id number] EXAM:  CT ABDOMEN AND PELVIS   ORDERED BY: SG MCWILLIAMS     PROCEDURE DATE:  01/19/2023          INTERPRETATION:  CLINICAL INFORMATION: Anemia. Evaluate for   retroperitoneal hematoma. Status post left leg compartment fasciotomy for   compartment syndrome on 1/18/2023.    COMPARISON: CT chest, abdomen, and pelvis 1/1/2023.    CONTRAST/COMPLICATIONS:  IV Contrast: None  Oral Contrast: None  Complications: None immediate    PROCEDURE:  CT of the Abdomen and Pelvis was performed.  Sagittal and coronal reformats were performed.    FINDINGS:  Limited evaluation of the vasculature and viscera in the absence of   intravenous contrast.    LOWER CHEST: Hypoattenuation of the blood pool relative to myocardium,   compatible with anemia.    LIVER: Hepatomegaly, measuring approximately 20 cm craniocaudally in the   right lobe. Low attenuation of the liver, compatible with steatosis.  BILE DUCTS: Normal caliber.  GALLBLADDER: Within normal limits.  SPLEEN: Within normal limits.  PANCREAS: Within normal limits.  ADRENALS: Within normal limits.  KIDNEYS/URETERS: Within normal limits.    BLADDER: Within normal limits.  REPRODUCTIVE ORGANS: The prostate is mildly enlarged.    BOWEL: No bowel obstruction. Appendix is normal. No obvious abnormality   of the rectum, as questioned on the prior CT exam, noting limited   evaluation due to underdistention and lack of intravenous contrast.  PERITONEUM: No ascites.  VESSELS: Atherosclerotic changes.  RETROPERITONEUM/LYMPH NODES: Unchanged mildly prominent left external   iliac lymph nodes, nonspecific, with reference lymph node measuring 1.2 x   0.8 cm (series 2 image 125). No retroperitoneal hematoma.  ABDOMINAL WALL: Small fat-containing left inguinal hernia. Small   [place] umbilical hernia containing fat and nonobstructed small bowel.   Incompletely imaged asymmetric subcutaneous soft tissue   infiltration/edema within the visualized proximal lateral left thigh.  BONES: Degenerative changes.    IMPRESSION:  No retroperitoneal hematoma.    Subcutaneous soft tissue infiltration/edema within the visualized   proximal lateral left thigh.        --- End of Report ---            CONSUELO BERMUDEZ MD; Attending Radiologist  This document has been electronically signed. Jan 19 20235:28PM

## 2023-01-20 NOTE — PROGRESS NOTE ADULT - NS ATTEND AMEND GEN_ALL_CORE FT
Patient still with ongoing transfusion requirements. CTAP without obvious source of blood loss. Still without any overt bleeding or BM. BUN/Cr is unremarkable. Though lower suspicion for GI blood losses given absence of any overt bleeding despite ongoing pRBC requirements, may warrant endoscopic evaluation with EGD to rule out upper GI blood losses if Hgb continues to drop. Continue empiric PPI. Additional recommendations as outlined above. Patient still with ongoing transfusion requirements. CTAP without obvious source of blood loss. Still without any overt bleeding or BM. BUN/Cr is unremarkable. Though lower suspicion for GI blood losses given absence of any overt bleeding despite ongoing pRBC requirements, may warrant endoscopic evaluation with EGD to rule out upper GI blood losses if Hgb continues to drop. Continue empiric PPI.   Please reach out to GI if overt bleeding, ongoing pRBC requirements or other significant change in clinical status as more urgent endoscopic evaluation may be warranted. Additional recommendations as outlined above.

## 2023-01-20 NOTE — PROGRESS NOTE ADULT - ASSESSMENT
52 year old with recent admission for change in MS , presents with change in MS and associated leukocytosis.  Found to have acute dvt of the left leg with tender/ distended leg      1) leukocytosis  nonspecific finding    ? reactive to acute dvt    Check blood cultures times two    Hemarthrosis during prior admission./ Now with DVT.  Hematology follow up       2) Left leg pain/ Leg with dvt  S/p fasciotomy for compartment syndrome  Concern for muscle necrosis  Can continue short course of zosyn while viability of muscle declares itself  Stop zosyn on 1/22  Vascular surgery following re next steps in management    3) abnormal imaging  Prior abnormal brain imaging  Ct with nonspecific lad    Consider repeat CT CAP    4) Change in MS  neurology follow up      Call the ID service with questions or concerns over the weekend.  [phone]

## 2023-01-20 NOTE — PROGRESS NOTE ADULT - ASSESSMENT
52-year-old  male presented with PMHx of TIAs (2011, 2013), CVAs x3 (2/2022, 8/3/2022, 11/5/2022) w/residual expressive aphasia on Eliquis, dyslipidemia and questionable ASD/PFO with a recent prolonged admission as outlined below representing to the hospital with slurred speech and AMS. GI consulted for further workup of occult anemia. Course c/b acute onset LLE DVT w/ compartment syndrome. 1/18 sp OR for LLE decompression fasciotomy.     #Occult Anemia- low suspicion for clinically significant gib, ob neg  #LLE compartment syndrome sp fasciotomy   #Leukocytosis  #AMS  Patient presenting with AMS and slurred speech found of unclear etiology. Incidentally found to have a hgb of 7 from recent d/c of 13. Risk factors for bleeding include ASA and Enoxaparin although rectal exam with NO overt signs of bleeding on exam with no ongoing bleeding over the past week per pt and facility. Recent c-scope on during last admission with diverticulosis in the sigmoid colon, non-bleeding internal hemorrhoid in addition to one 15 mm benign-appearing with path showing TA. Exam is notable for a hematoma over the abdominal wall with extremely distended LLE  1/19- sp OR yesterday for LLE fasciotomy, w/ drop in hgb on AM labs, no overt gib per dw nursing  1/20- sp 3u prbc yesterday w/ inadequate response, no bms or overt gib per dw pt and staff, ctap neg for rph    Recommendations  -trend cbc, transfuse for hgb >/= 7  -maintain active t&s, 2 large bore IVs  -can increase PPI to BID for gastroprotection   -trend LFTs, INR   -no immediate plans for endoscopic intervention at this time given lack of overt gib, however ultimately if ongoing transfusions requirements without alternative source identified and/or if overt bleeding, may need to consider endoscopic evaluation  -rest of care as per primary team    *all recommendations are tentative until note is attested by attending*    RENÉE Brewer PA-C, RD, CDN  available on TEAMS for questions    after 5pm/weekends, please contact the on-call GI fellow at [phone]  52-year-old  male presented with PMHx of TIAs (2011, 2013), CVAs x3 (2/2022, 8/3/2022, 11/5/2022) w/residual expressive aphasia on Eliquis, dyslipidemia and questionable ASD/PFO with a recent prolonged admission as outlined below representing to the hospital with slurred speech and AMS. GI consulted for further workup of occult anemia. Course c/b acute onset LLE DVT w/ compartment syndrome. 1/18 sp OR for LLE decompression fasciotomy.     #Occult Anemia- low suspicion for clinically significant gib, ob neg  #LLE compartment syndrome sp fasciotomy   #Leukocytosis  #AMS  Patient presenting with AMS and slurred speech found of unclear etiology. Incidentally found to have a hgb of 7 from recent d/c of 13. Risk factors for bleeding include ASA and Enoxaparin although rectal exam with NO overt signs of bleeding on exam with no ongoing bleeding over the past week per pt and facility. Recent c-scope on during last admission with diverticulosis in the sigmoid colon, non-bleeding internal hemorrhoid in addition to one 15 mm benign-appearing with path showing TA. Exam is notable for a hematoma over the abdominal wall with extremely distended LLE  1/19- sp OR yesterday for LLE fasciotomy, w/ drop in hgb on AM labs, no overt gib per dw nursing  1/20- sp 3u prbc yesterday w/ inadequate response, no bms or overt gib per dw pt and staff, ctap neg for rph    Recommendations  -trend cbc, transfuse for hgb >/= 7  -maintain active t&s, 2 large bore IVs  -can increase PPI to BID for gastroprotection   -trend LFTs, INR   -no immediate plans for endoscopic intervention at this time given lack of overt gib, however ultimately if ongoing transfusions requirements without alternative source identified and/or if overt bleeding, may need to consider endoscopic evaluation potentially on Monday (egd/colon)    -rest of care as per primary team    *all recommendations are tentative until note is attested by attending*    RENÉE Brewer PA-C, RD, CDN  available on TEAMS for questions    after 5pm/weekends, please contact the on-call GI fellow at [phone]  52-year-old  male presented with PMHx of TIAs (2011, 2013), CVAs x3 (2/2022, 8/3/2022, 11/5/2022) w/residual expressive aphasia on Eliquis, dyslipidemia and questionable ASD/PFO with a recent prolonged admission as outlined below representing to the hospital with slurred speech and AMS. GI consulted for further workup of occult anemia. Course c/b acute onset LLE DVT w/ compartment syndrome. 1/18 sp OR for LLE decompression fasciotomy.     #Occult Anemia- low suspicion for clinically significant gib, ob neg  #LLE compartment syndrome sp fasciotomy   #Leukocytosis  #AMS  Patient presenting with AMS and slurred speech found of unclear etiology. Incidentally found to have a hgb of 7 from recent d/c of 13. Risk factors for bleeding include ASA and Enoxaparin although rectal exam with NO overt signs of bleeding on exam with no ongoing bleeding over the past week per pt and facility. Recent c-scope on during last admission with diverticulosis in the sigmoid colon, non-bleeding internal hemorrhoid in addition to one 15 mm benign-appearing with path showing TA. Exam is notable for a hematoma over the abdominal wall with extremely distended LLE  1/19- sp OR yesterday for LLE fasciotomy, w/ drop in hgb on AM labs, no overt gib per dw nursing  1/20- sp 3u prbc yesterday w/ inadequate response, no bms or overt gib per dw pt and staff, ctap neg for rph    Recommendations  -trend cbc, transfuse for hgb >/= 7  -maintain active t&s, 2 large bore IVs  -can increase PPI to BID for gastroprotection   -trend LFTs, INR   -no immediate plans for endoscopic intervention at this time given lack of overt gib, however ultimately if ongoing transfusions requirements without alternative source identified and/or if overt bleeding, may need to consider endoscopic evaluation potentially on Monday (egd/colon) if otherwise medically optimized  -please reach out to GI team if w/ any acute clinical changes for consideration of more urgent endoscopic evaluation  -rest of care as per primary team    *all recommendations are tentative until note is attested by attending*    RENÉE Brewer PA-C, RD, CDN  available on TEAMS for questions    after 5pm/weekends, please contact the on-call GI fellow at [phone]  52-year-old  male presented with PMHx of TIAs (2011, 2013), CVAs x3 (2/2022, 8/3/2022, 11/5/2022) w/residual expressive aphasia on Eliquis, dyslipidemia and questionable ASD/PFO with a recent prolonged admission as outlined below representing to the hospital with slurred speech and AMS. GI consulted for further workup of occult anemia. Course c/b acute onset LLE DVT w/ compartment syndrome. 1/18 sp OR for LLE decompression fasciotomy.     #Occult Anemia- low suspicion for clinically significant gib, ob neg  #LLE compartment syndrome sp fasciotomy   #Leukocytosis  #AMS  Patient presenting with AMS and slurred speech found of unclear etiology. Incidentally found to have a hgb of 7 from recent d/c of 13. Risk factors for bleeding include ASA and Enoxaparin although rectal exam with NO overt signs of bleeding on exam with no ongoing bleeding over the past week per pt and facility. Recent c-scope on during last admission with diverticulosis in the sigmoid colon, non-bleeding internal hemorrhoid in addition to one 15 mm benign-appearing with path showing TA. Exam is notable for a hematoma over the abdominal wall with extremely distended LLE  1/19- sp OR yesterday for LLE fasciotomy, w/ drop in hgb on AM labs, no overt gib per dw nursing  1/20- sp 3u prbc yesterday w/ inadequate response, no bms or overt gib per dw pt and staff, ctap neg for rph    Recommendations  -trend cbc, transfuse for hgb >/= 7  -maintain active t&s, 2 large bore IVs  -can increase PPI to BID for gastroprotection   -trend LFTs, INR   -no immediate plans for endoscopic intervention at this time given lack of overt gib, however ultimately if ongoing transfusions requirements without alternative source identified and/or if overt bleeding, may need to consider endoscopic evaluation potentially on Monday (egd) if otherwise medically optimized  -please reach out to GI team if w/ any acute clinical changes for consideration of more urgent endoscopic evaluation  -rest of care as per primary team    *all recommendations are tentative until note is attested by attending*    RENÉE Brewer PA-C, RD, CDN  available on TEAMS for questions    after 5pm/weekends, please contact the on-call GI fellow at [phone]

## 2023-01-20 NOTE — PROGRESS NOTE ADULT - ASSESSMENT
Patient is a 52-year-old male with a past medical history of CVA/TIA, ASD/PFO?, hyperlipidemia presents emerged department today with altered mental status from his nursing facility.  Patient just had a stay in this hospital when he was found unresponsive at home.  An extensive work-up with no significant etiology found.  There were thoughts that it was related to seizure activity however neurology did not find any seizure activity on their work-up.  Patient went to a nursing facility and rehab.  Was doing well until yesterday his parents noted him to be somewhat confused and having word finding difficulties.  Today his speech was worse when noticed by the wife.  He is also noted to have odd behavior where he was pulling at sheets and the strings of the masks.  He did not have this behavior before.  Only residual deficit from his previous strokes with some random word finding abilities however his speech today is reported to be significantly worse with worse word finding.  The patient denies any pain at this time.  He does acknowledge that he is having trouble speaking.  There is also report from the family and patient is complaining of left calf pain.  This pain is much more severe than it has been in the past.    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, SP LP, no malignancy on cyto   tele monitoring   Plan for MRI per Neuro recs, ordered    # Lekucytosis  Pan CX   Infectious W/U   CTA negative   ID eval appreciated    Hematology eval appreciated, follow up recs     #  Compartment syndrome   S/P Fasciotomy   Vascular follow up appreciated   dressing per vascular follow up recs   ID eval appreciated, cont zosyn,       # ANemia  Occult negative  2 units PRBC  serial CBC   GI follow up appreciated   1 more unit

## 2023-01-20 NOTE — PROGRESS NOTE ADULT - SUBJECTIVE AND OBJECTIVE BOX
Name of Patient : TAMI DUNHAM  MRN: [id number]  Date of visit: 01-20-23       Subjective: Patient seen and examined. No new events except as noted.   doing okay calm     REVIEW OF SYSTEMS:    CONSTITUTIONAL: No weakness, fevers or chills  EYES/ENT: No visual changes;  No vertigo or throat pain   NECK: No pain or stiffness  RESPIRATORY: No cough, wheezing, hemoptysis; No shortness of breath  CARDIOVASCULAR: No chest pain or palpitations  GASTROINTESTINAL: No abdominal or epigastric pain. No nausea, vomiting, or hematemesis; No diarrhea or constipation. No melena or hematochezia.  GENITOURINARY: No dysuria, frequency or hematuria  NEUROLOGICAL: No numbness or weakness  SKIN: No itching, burning, rashes, or lesions   All other review of systems is negative unless indicated above.    MEDICATIONS:  MEDICATIONS  (STANDING):  aspirin enteric coated 81 milliGRAM(s) Oral daily  atorvastatin 40 milliGRAM(s) Oral at bedtime  lacosamide IVPB 100 milliGRAM(s) IV Intermittent every 12 hours  levETIRAcetam  IVPB 1500 milliGRAM(s) IV Intermittent every 12 hours  pantoprazole  Injectable 40 milliGRAM(s) IV Push two times a day  piperacillin/tazobactam IVPB.. 3.375 Gram(s) IV Intermittent every 8 hours      PHYSICAL EXAM:  T(C): 37.1 (01-20-23 @ 15:30), Max: 37.2 (01-19-23 @ 18:44)  HR: 92 (01-20-23 @ 15:30) (85 - 100)  BP: 107/67 (01-20-23 @ 15:30) (105/64 - 126/69)  RR: 16 (01-20-23 @ 15:30) (16 - 18)  SpO2: 99% (01-20-23 @ 15:30) (97% - 100%)  Wt(kg): --  I&O's Summary    19 Jan 2023 07:01  -  20 Jan 2023 07:00  --------------------------------------------------------  IN: 0 mL / OUT: 650 mL / NET: -650 mL          Appearance: Normal	  HEENT:  PERRLA   Lymphatic: No lymphadenopathy   Cardiovascular: Normal S1 S2, no JVD  Respiratory: normal effort , clear  Gastrointestinal:  Soft, Non-tender  Skin: No rashes,  warm to touch  Psychiatry:  Mood & affect appropriate  Musculuskeletal: LE LE edema       All labs, Imaging and EKGs personally reviewed    01-19-23 @ 07:01  -  01-20-23 @ 07:00  --------------------------------------------------------  IN: 0 mL / OUT: 650 mL / NET: -650 mL                            7.3    13.50 )-----------( 225      ( 20 Jan 2023 04:45 )             22.3               01-20    137  |  102  |  14  ----------------------------<  104<H>  3.7   |  25  |  0.71    Ca    7.8<L>      20 Jan 2023 04:45  Phos  4.5     01-19  Mg     2.00     01-20    TPro  5.5<L>  /  Alb  2.5<L>  /  TBili  0.9  /  DBili  x   /  AST  72<H>  /  ALT  49<H>  /  AlkPhos  85  01-20

## 2023-01-20 NOTE — PROGRESS NOTE ADULT - SUBJECTIVE AND OBJECTIVE BOX
Patient is a 52y old  Male who presents with a chief complaint of anemia (20 Jan 2023 09:06)    Patient seen this afternoon. He reports feeling well but is frustrated about the loss of sensation in his LLE.    MEDICATIONS  (STANDING):  aspirin enteric coated 81 milliGRAM(s) Oral daily  atorvastatin 40 milliGRAM(s) Oral at bedtime  lacosamide IVPB 100 milliGRAM(s) IV Intermittent every 12 hours  levETIRAcetam  IVPB 1500 milliGRAM(s) IV Intermittent every 12 hours  pantoprazole  Injectable 40 milliGRAM(s) IV Push daily  piperacillin/tazobactam IVPB.. 3.375 Gram(s) IV Intermittent every 8 hours    MEDICATIONS  (PRN):  acetaminophen     Tablet .. 650 milliGRAM(s) Oral every 6 hours PRN Temp greater or equal to 38C (100.4F), Mild Pain (1 - 3), Moderate Pain (4 - 6)  oxyCODONE    IR 5 milliGRAM(s) Oral every 6 hours PRN Severe Pain (7 - 10)        Vital Signs Last 24 Hrs  T(C): 36.8 (20 Jan 2023 12:15), Max: 37.2 (19 Jan 2023 18:44)  T(F): 98.3 (20 Jan 2023 12:15), Max: 99 (19 Jan 2023 18:44)  HR: 85 (20 Jan 2023 12:15) (85 - 100)  BP: 117/64 (20 Jan 2023 12:15) (105/64 - 130/58)  BP(mean): --  RR: 17 (20 Jan 2023 12:15) (16 - 18)  SpO2: 100% (20 Jan 2023 12:15) (97% - 100%)    Parameters below as of 20 Jan 2023 12:15  Patient On (Oxygen Delivery Method): room air        PE  NAD  Awake, alert  Anicteric, MMM  Abd soft, NT, ND. +hematoma in RLQ   Left foot and ankle with significant edema. LLE wrapped in ace bandage.   No rash grossly                          7.3    13.50 )-----------( 225      ( 20 Jan 2023 04:45 )             22.3       01-20    137  |  102  |  14  ----------------------------<  104<H>  3.7   |  25  |  0.71    Ca    7.8<L>      20 Jan 2023 04:45  Phos  4.5     01-19  Mg     2.00     01-20    TPro  5.5<L>  /  Alb  2.5<L>  /  TBili  0.9  /  DBili  x   /  AST  72<H>  /  ALT  49<H>  /  AlkPhos  85  01-20

## 2023-01-20 NOTE — PROGRESS NOTE ADULT - SUBJECTIVE AND OBJECTIVE BOX
Follow Up:      Inverval History/ROS:Patient is a 52y old  Male who presents with a chief complaint of anemia (20 Jan 2023 09:06)    notes minimal sensation to left foot  No fever    Allergies    No Known Allergies    Intolerances        ANTIMICROBIALS:  piperacillin/tazobactam IVPB.. 3.375 every 8 hours      OTHER MEDS:  acetaminophen     Tablet .. 650 milliGRAM(s) Oral every 6 hours PRN  aspirin enteric coated 81 milliGRAM(s) Oral daily  atorvastatin 40 milliGRAM(s) Oral at bedtime  lacosamide IVPB 100 milliGRAM(s) IV Intermittent every 12 hours  levETIRAcetam  IVPB 1500 milliGRAM(s) IV Intermittent every 12 hours  oxyCODONE    IR 5 milliGRAM(s) Oral every 6 hours PRN  pantoprazole  Injectable 40 milliGRAM(s) IV Push two times a day      Vital Signs Last 24 Hrs  T(C): 36.8 (20 Jan 2023 12:15), Max: 37.2 (19 Jan 2023 18:44)  T(F): 98.3 (20 Jan 2023 12:15), Max: 99 (19 Jan 2023 18:44)  HR: 85 (20 Jan 2023 12:15) (85 - 100)  BP: 117/64 (20 Jan 2023 12:15) (105/64 - 126/69)  BP(mean): --  RR: 17 (20 Jan 2023 12:15) (17 - 18)  SpO2: 100% (20 Jan 2023 12:15) (97% - 100%)    Parameters below as of 20 Jan 2023 12:15  Patient On (Oxygen Delivery Method): room air        PHYSICAL EXAM:  General: [ x] non-toxic  HEAD/EYES: [ ] PERRL [x ] white sclera [ ] icterus  ENT:  [ ] normal [ ] supple [ ] thrush [ ] pharyngeal exudate  Cardiovascular:   [ ] murmur [x ] normal [ ] PPM/AICD  Respiratory:  [x ] clear to ausculation bilaterally  GI:  [ x] soft, non-tender, normal bowel sounds  :  [ ] fish [ ] no CVA tenderness   Musculoskeletal:  [ ] no synovitis  Neurologic:  [ ] non-focal exam   Skin:  [x ] no rash  Lymph: [x ] no lymphadenopathy  Psychiatric:  [x ] appropriate affect [ ] alert & oriented  Lines:  [x ] no phlebitis [ ] central line                                7.3    13.50 )-----------( 225      ( 20 Jan 2023 04:45 )             22.3       01-20    137  |  102  |  14  ----------------------------<  104<H>  3.7   |  25  |  0.71    Ca    7.8<L>      20 Jan 2023 04:45  Phos  4.5     01-19  Mg     2.00     01-20    TPro  5.5<L>  /  Alb  2.5<L>  /  TBili  0.9  /  DBili  x   /  AST  72<H>  /  ALT  49<H>  /  AlkPhos  85  01-20          MICROBIOLOGY:Culture Results:   No growth to date. (01-18-23 @ 08:05)  Culture Results:   No growth to date. (01-18-23 @ 07:50)      RADIOLOGY:

## 2023-01-20 NOTE — PROGRESS NOTE ADULT - NS ATTEND AMEND GEN_ALL_CORE FT
?tiology of his acute anemia, ?bleed into his left leg.  Required decompression fasciotomy. Continue transfusional support.   Check retic count. His leukocytosis was likely reactive due to acute compartment syndrome, improving.  Agree with ID, doubt infection.   Acute DVT left popliteal vein, left posterior tibial vein, and left gastrocnemius vein, provoked due to recent immobility/compartment syndrome.    Hypercoagulable work up negative to date.  As he cannot be anticoagulated, would recommend IVC filter.

## 2023-01-20 NOTE — PROGRESS NOTE ADULT - ASSESSMENT
This is a 52 year old male who presents with altered mental status.     1. Anemia   -- Pt continues to require transfusions   -- Occult blood negative. Labs show some iron deficiency but elevated ferritin. No evidence of B12/folate deficiency or hemolysis.   -- GI consulted, low suspicion for GI bleed. Pt had colonoscopy last admission w/o bleeding. If he continues to require frequent transfusions, may plan for scope   -- CT a/p negative for RP bleed   -- Transfuse to maintain hg >7    2. r/o malignancy   -- Prior admission had MRI with abnormal findings, possible malignancy. LP w/o malignant cells. Colonoscopy with biopsy of polyp lesion showing tubular adenoma. Pelvic LN biopsy requested however risks outweighed benefits.   -- CEA, , AFP wnl. PSA mildly elevated at 5.24.  -- Outpatient follow-up to discuss need for PET/CT     3. DVT   -- US LE shows new acute LLE DVT compared to recent imaging on 12/31 despite pt on Lovenox  -- Unclear if he was compliant with anticoagulation   -- AT III, protein C/S, Lupus profile negative. Follow up aCL ab, b2GP ab, factor V Leiden, prothrombin gene mt  -- AC on hold for anemia. will need to consider IVC filter if unable to anticoagulate   -- s/p emergent fasciotomy for compartment syndrome     4. AMS   -- per neurology  -- mental status improved  -- Follow up repeat MRI     Will continue to follow.    Caro Dow PA-C  Hematology/Oncology  [facility]  [phone] (office)  [phone] (alt office)  Evenings and weekends please call MD on call or office     This is a 52 year old male who presents with altered mental status.      Anemia   -- Pt continues to require transfusions   -- Occult blood negative. Labs show some iron deficiency but elevated ferritin. No evidence of B12/folate deficiency or hemolysis.   -- GI consulted, low suspicion for GI bleed. Pt had colonoscopy last admission w/o bleeding. If he continues to require frequent transfusions, may plan for scope   -- CT a/p negative for RP bleed   --Ordered SPEP and light chains/Quiggs to rule out  plasma cell dyscrasia, will follow  -- Transfuse to maintain hg >7     r/o malignancy   -- Prior admission had MRI with abnormal findings, possible malignancy. LP w/o malignant cells. Colonoscopy with biopsy of polyp lesion showing tubular adenoma. Pelvic LN biopsy requested however risks outweighed benefits.   -- CEA, , AFP wnl. PSA mildly elevated at 5.24.  -- Outpatient follow-up to discuss need for PET/CT     DVT   -- US LE shows new acute LLE DVT compared to recent imaging on 12/31 despite pt on Lovenox  -- Unclear if he was compliant with anticoagulation   -- AT III, protein C/S, Lupus profile negative. Follow up aCL ab, b2GP ab, factor V Leiden, prothrombin gene mt  -- AC on hold for anemia. will need to consider IVC filter if unable to anticoagulate   -- s/p emergent fasciotomy for compartment syndrome      AMS   -- per neurology  -- mental status improved  -- Follow up repeat MRI     Will continue to follow.    Nataly Sen NP  Hematology/Oncology  [facility]  [phone] (Office)  [phone] (Alt office)  Evenings and weekends please call MD on call or office

## 2023-01-21 LAB
ALBUMIN SERPL ELPH-MCNC: 2.5 G/DL — LOW (ref 3.3–5)
ALP SERPL-CCNC: 153 U/L — HIGH (ref 40–120)
ALT FLD-CCNC: 61 U/L — HIGH (ref 4–41)
ANION GAP SERPL CALC-SCNC: 10 MMOL/L — SIGNIFICANT CHANGE UP (ref 7–14)
APTT BLD: 25.5 SEC — LOW (ref 27–36.3)
APTT BLD: 29.6 SEC — SIGNIFICANT CHANGE UP (ref 27–36.3)
AST SERPL-CCNC: 55 U/L — HIGH (ref 4–40)
B2 GLYCOPROT1 AB SER QL: NEGATIVE — SIGNIFICANT CHANGE UP
BILIRUB SERPL-MCNC: 0.7 MG/DL — SIGNIFICANT CHANGE UP (ref 0.2–1.2)
BLD GP AB SCN SERPL QL: NEGATIVE — SIGNIFICANT CHANGE UP
BUN SERPL-MCNC: 11 MG/DL — SIGNIFICANT CHANGE UP (ref 7–23)
CALCIUM SERPL-MCNC: 8 MG/DL — LOW (ref 8.4–10.5)
CARDIOLIPIN AB SER-ACNC: NEGATIVE — SIGNIFICANT CHANGE UP
CARDIOLIPIN AB SER-ACNC: NEGATIVE — SIGNIFICANT CHANGE UP
CHLORIDE SERPL-SCNC: 103 MMOL/L — SIGNIFICANT CHANGE UP (ref 98–107)
CO2 SERPL-SCNC: 26 MMOL/L — SIGNIFICANT CHANGE UP (ref 22–31)
CREAT SERPL-MCNC: 0.7 MG/DL — SIGNIFICANT CHANGE UP (ref 0.5–1.3)
EGFR: 111 ML/MIN/1.73M2 — SIGNIFICANT CHANGE UP
GLUCOSE SERPL-MCNC: 110 MG/DL — HIGH (ref 70–99)
HCT VFR BLD CALC: 23.6 % — LOW (ref 39–50)
HCT VFR BLD CALC: 24.7 % — LOW (ref 39–50)
HCT VFR BLD CALC: 28.6 % — LOW (ref 39–50)
HGB BLD-MCNC: 7.9 G/DL — LOW (ref 13–17)
HGB BLD-MCNC: 8.4 G/DL — LOW (ref 13–17)
HGB BLD-MCNC: 9.4 G/DL — LOW (ref 13–17)
MAGNESIUM SERPL-MCNC: 2 MG/DL — SIGNIFICANT CHANGE UP (ref 1.6–2.6)
MCHC RBC-ENTMCNC: 29.5 PG — SIGNIFICANT CHANGE UP (ref 27–34)
MCHC RBC-ENTMCNC: 29.5 PG — SIGNIFICANT CHANGE UP (ref 27–34)
MCHC RBC-ENTMCNC: 29.8 PG — SIGNIFICANT CHANGE UP (ref 27–34)
MCHC RBC-ENTMCNC: 32.9 GM/DL — SIGNIFICANT CHANGE UP (ref 32–36)
MCHC RBC-ENTMCNC: 33.5 GM/DL — SIGNIFICANT CHANGE UP (ref 32–36)
MCHC RBC-ENTMCNC: 34 GM/DL — SIGNIFICANT CHANGE UP (ref 32–36)
MCV RBC AUTO: 87.6 FL — SIGNIFICANT CHANGE UP (ref 80–100)
MCV RBC AUTO: 88.1 FL — SIGNIFICANT CHANGE UP (ref 80–100)
MCV RBC AUTO: 89.7 FL — SIGNIFICANT CHANGE UP (ref 80–100)
NRBC # BLD: 0 /100 WBCS — SIGNIFICANT CHANGE UP (ref 0–0)
NRBC # FLD: 0 K/UL — SIGNIFICANT CHANGE UP (ref 0–0)
PLATELET # BLD AUTO: 191 K/UL — SIGNIFICANT CHANGE UP (ref 150–400)
PLATELET # BLD AUTO: 273 K/UL — SIGNIFICANT CHANGE UP (ref 150–400)
PLATELET # BLD AUTO: 334 K/UL — SIGNIFICANT CHANGE UP (ref 150–400)
POTASSIUM SERPL-MCNC: 3.7 MMOL/L — SIGNIFICANT CHANGE UP (ref 3.5–5.3)
POTASSIUM SERPL-SCNC: 3.7 MMOL/L — SIGNIFICANT CHANGE UP (ref 3.5–5.3)
PROT SERPL-MCNC: 5.6 G/DL — LOW (ref 6–8.3)
PROT SERPL-MCNC: 6.1 G/DL — SIGNIFICANT CHANGE UP (ref 6–8.3)
RBC # BLD: 2.68 M/UL — LOW (ref 4.2–5.8)
RBC # BLD: 2.82 M/UL — LOW (ref 4.2–5.8)
RBC # BLD: 3.19 M/UL — LOW (ref 4.2–5.8)
RBC # FLD: 16.3 % — HIGH (ref 10.3–14.5)
RBC # FLD: 16.7 % — HIGH (ref 10.3–14.5)
RBC # FLD: 16.7 % — HIGH (ref 10.3–14.5)
RH IG SCN BLD-IMP: NEGATIVE — SIGNIFICANT CHANGE UP
SODIUM SERPL-SCNC: 139 MMOL/L — SIGNIFICANT CHANGE UP (ref 135–145)
WBC # BLD: 11.39 K/UL — HIGH (ref 3.8–10.5)
WBC # BLD: 12.37 K/UL — HIGH (ref 3.8–10.5)
WBC # BLD: 12.64 K/UL — HIGH (ref 3.8–10.5)
WBC # FLD AUTO: 11.39 K/UL — HIGH (ref 3.8–10.5)
WBC # FLD AUTO: 12.37 K/UL — HIGH (ref 3.8–10.5)
WBC # FLD AUTO: 12.64 K/UL — HIGH (ref 3.8–10.5)

## 2023-01-21 PROCEDURE — 95720 EEG PHY/QHP EA INCR W/VEEG: CPT

## 2023-01-21 PROCEDURE — 84165 PROTEIN E-PHORESIS SERUM: CPT | Mod: 26

## 2023-01-21 RX ORDER — HEPARIN SODIUM 5000 [USP'U]/ML
3000 INJECTION INTRAVENOUS; SUBCUTANEOUS EVERY 6 HOURS
Refills: 0 | Status: DISCONTINUED | OUTPATIENT
Start: 2023-01-21 | End: 2023-01-28

## 2023-01-21 RX ORDER — HEPARIN SODIUM 5000 [USP'U]/ML
INJECTION INTRAVENOUS; SUBCUTANEOUS
Qty: 25000 | Refills: 0 | Status: DISCONTINUED | OUTPATIENT
Start: 2023-01-21 | End: 2023-01-23

## 2023-01-21 RX ORDER — HEPARIN SODIUM 5000 [USP'U]/ML
6500 INJECTION INTRAVENOUS; SUBCUTANEOUS EVERY 6 HOURS
Refills: 0 | Status: DISCONTINUED | OUTPATIENT
Start: 2023-01-21 | End: 2023-01-28

## 2023-01-21 RX ORDER — HEPARIN SODIUM 5000 [USP'U]/ML
3000 INJECTION INTRAVENOUS; SUBCUTANEOUS EVERY 6 HOURS
Refills: 0 | Status: DISCONTINUED | OUTPATIENT
Start: 2023-01-21 | End: 2023-01-21

## 2023-01-21 RX ORDER — CHLORHEXIDINE GLUCONATE 213 G/1000ML
1 SOLUTION TOPICAL DAILY
Refills: 0 | Status: DISCONTINUED | OUTPATIENT
Start: 2023-01-21 | End: 2023-02-06

## 2023-01-21 RX ORDER — HEPARIN SODIUM 5000 [USP'U]/ML
INJECTION INTRAVENOUS; SUBCUTANEOUS
Qty: 25000 | Refills: 0 | Status: DISCONTINUED | OUTPATIENT
Start: 2023-01-21 | End: 2023-01-21

## 2023-01-21 RX ORDER — HEPARIN SODIUM 5000 [USP'U]/ML
6500 INJECTION INTRAVENOUS; SUBCUTANEOUS EVERY 6 HOURS
Refills: 0 | Status: DISCONTINUED | OUTPATIENT
Start: 2023-01-21 | End: 2023-01-21

## 2023-01-21 RX ADMIN — LEVETIRACETAM 400 MILLIGRAM(S): 250 TABLET, FILM COATED ORAL at 05:03

## 2023-01-21 RX ADMIN — HEPARIN SODIUM 1700 UNIT(S)/HR: 5000 INJECTION INTRAVENOUS; SUBCUTANEOUS at 23:09

## 2023-01-21 RX ADMIN — OXYCODONE HYDROCHLORIDE 5 MILLIGRAM(S): 5 TABLET ORAL at 05:07

## 2023-01-21 RX ADMIN — Medication 650 MILLIGRAM(S): at 13:27

## 2023-01-21 RX ADMIN — PIPERACILLIN AND TAZOBACTAM 25 GRAM(S): 4; .5 INJECTION, POWDER, LYOPHILIZED, FOR SOLUTION INTRAVENOUS at 05:51

## 2023-01-21 RX ADMIN — CHLORHEXIDINE GLUCONATE 1 APPLICATION(S): 213 SOLUTION TOPICAL at 12:19

## 2023-01-21 RX ADMIN — Medication 81 MILLIGRAM(S): at 11:21

## 2023-01-21 RX ADMIN — LACOSAMIDE 120 MILLIGRAM(S): 50 TABLET ORAL at 17:15

## 2023-01-21 RX ADMIN — Medication 650 MILLIGRAM(S): at 21:28

## 2023-01-21 RX ADMIN — OXYCODONE HYDROCHLORIDE 5 MILLIGRAM(S): 5 TABLET ORAL at 05:37

## 2023-01-21 RX ADMIN — OXYCODONE HYDROCHLORIDE 5 MILLIGRAM(S): 5 TABLET ORAL at 11:19

## 2023-01-21 RX ADMIN — LEVETIRACETAM 400 MILLIGRAM(S): 250 TABLET, FILM COATED ORAL at 17:18

## 2023-01-21 RX ADMIN — HEPARIN SODIUM 1400 UNIT(S)/HR: 5000 INJECTION INTRAVENOUS; SUBCUTANEOUS at 16:07

## 2023-01-21 RX ADMIN — PIPERACILLIN AND TAZOBACTAM 25 GRAM(S): 4; .5 INJECTION, POWDER, LYOPHILIZED, FOR SOLUTION INTRAVENOUS at 21:14

## 2023-01-21 RX ADMIN — OXYCODONE HYDROCHLORIDE 5 MILLIGRAM(S): 5 TABLET ORAL at 18:57

## 2023-01-21 RX ADMIN — OXYCODONE HYDROCHLORIDE 5 MILLIGRAM(S): 5 TABLET ORAL at 12:19

## 2023-01-21 RX ADMIN — OXYCODONE HYDROCHLORIDE 5 MILLIGRAM(S): 5 TABLET ORAL at 17:57

## 2023-01-21 RX ADMIN — HEPARIN SODIUM 6500 UNIT(S): 5000 INJECTION INTRAVENOUS; SUBCUTANEOUS at 23:22

## 2023-01-21 RX ADMIN — PANTOPRAZOLE SODIUM 40 MILLIGRAM(S): 20 TABLET, DELAYED RELEASE ORAL at 05:03

## 2023-01-21 RX ADMIN — PANTOPRAZOLE SODIUM 40 MILLIGRAM(S): 20 TABLET, DELAYED RELEASE ORAL at 17:17

## 2023-01-21 RX ADMIN — ATORVASTATIN CALCIUM 40 MILLIGRAM(S): 80 TABLET, FILM COATED ORAL at 21:13

## 2023-01-21 RX ADMIN — HEPARIN SODIUM 1400 UNIT(S)/HR: 5000 INJECTION INTRAVENOUS; SUBCUTANEOUS at 19:05

## 2023-01-21 RX ADMIN — Medication 650 MILLIGRAM(S): at 12:27

## 2023-01-21 RX ADMIN — LACOSAMIDE 120 MILLIGRAM(S): 50 TABLET ORAL at 05:21

## 2023-01-21 RX ADMIN — PIPERACILLIN AND TAZOBACTAM 25 GRAM(S): 4; .5 INJECTION, POWDER, LYOPHILIZED, FOR SOLUTION INTRAVENOUS at 12:20

## 2023-01-21 NOTE — PROGRESS NOTE ADULT - SUBJECTIVE AND OBJECTIVE BOX
Name of Patient : TAMI DUNHAM  MRN: [id number]  Date of visit: 01-21-23       Subjective: Patient seen and examined. No new events except as noted.   Doing okay     REVIEW OF SYSTEMS:    CONSTITUTIONAL: No weakness, fevers or chills  EYES/ENT: No visual changes;  No vertigo or throat pain   NECK: No pain or stiffness  RESPIRATORY: No cough, wheezing, hemoptysis; No shortness of breath  CARDIOVASCULAR: No chest pain or palpitations  GASTROINTESTINAL: No abdominal or epigastric pain. No nausea, vomiting, or hematemesis; No diarrhea or constipation. No melena or hematochezia.  GENITOURINARY: No dysuria, frequency or hematuria  NEUROLOGICAL: No numbness or weakness  SKIN: No itching, burning, rashes, or lesions   All other review of systems is negative unless indicated above.    MEDICATIONS:  MEDICATIONS  (STANDING):  aspirin enteric coated 81 milliGRAM(s) Oral daily  atorvastatin 40 milliGRAM(s) Oral at bedtime  chlorhexidine 2% Cloths 1 Application(s) Topical daily  heparin  Infusion.  Unit(s)/Hr (14 mL/Hr) IV Continuous <Continuous>  lacosamide IVPB 100 milliGRAM(s) IV Intermittent every 12 hours  levETIRAcetam  IVPB 1500 milliGRAM(s) IV Intermittent every 12 hours  pantoprazole  Injectable 40 milliGRAM(s) IV Push two times a day  piperacillin/tazobactam IVPB.. 3.375 Gram(s) IV Intermittent every 8 hours      PHYSICAL EXAM:  T(C): 36.9 (01-21-23 @ 18:30), Max: 37.1 (01-21-23 @ 05:07)  HR: 91 (01-21-23 @ 18:30) (84 - 97)  BP: 130/71 (01-21-23 @ 18:30) (124/74 - 132/72)  RR: 17 (01-21-23 @ 18:30) (17 - 19)  SpO2: 100% (01-21-23 @ 18:30) (98% - 100%)  Wt(kg): --  I&O's Summary        Appearance: Normal	  HEENT:  PERRLA   Lymphatic: No lymphadenopathy   Cardiovascular: Normal S1 S2, no JVD  Respiratory: normal effort , clear  Gastrointestinal:  Soft, Non-tender  Skin: No rashes,  warm to touch  Psychiatry:  Mood & affect appropriate  Musculuskeletal: L LE edema, skin change color, no motor movement       All labs, Imaging and EKGs personally reviewed                           9.4    12.37 )-----------( 191      ( 21 Jan 2023 13:45 )             28.6               01-21    139  |  103  |  11  ----------------------------<  110<H>  3.7   |  26  |  0.70    Ca    8.0<L>      21 Jan 2023 06:30  Mg     2.00     01-21    TPro  6.1  /  Alb  x   /  TBili  x   /  DBili  x   /  AST  x   /  ALT  x   /  AlkPhos  x   01-21    PTT - ( 21 Jan 2023 06:30 )  PTT:25.5 sec

## 2023-01-21 NOTE — PATIENT PROFILE ADULT - FALL HARM RISK - HARM RISK INTERVENTIONS
Assistance with ambulation/Assistance OOB with selected safe patient handling equipment/Communicate Risk of Fall with Harm to all staff/Discuss with provider need for PT consult/Monitor gait and stability/Provide patient with walking aids - walker, cane, crutches/Reinforce activity limits and safety measures with patient and family/Review medications for side effects contributing to fall risk/Sit up slowly, dangle for a short time, stand at bedside before walking/Tailored Fall Risk Interventions/Toileting schedule using arm’s reach rule for commode and bathroom/Use of alarms - bed, chair and/or voice tab/Visual Cue: Yellow wristband and red socks/Bed in lowest position, wheels locked, appropriate side rails in place/Call bell, personal items and telephone in reach/Instruct patient to call for assistance before getting out of bed or chair/Non-slip footwear when patient is out of bed/[place] to call system/Physically safe environment - no spills, clutter or unnecessary equipment/Purposeful Proactive Rounding/Room/bathroom lighting operational, light cord in reach

## 2023-01-21 NOTE — PROGRESS NOTE ADULT - SUBJECTIVE AND OBJECTIVE BOX
Patient is a 52y old  Male who presents with a chief complaint of anemia (20 Jan 2023 09:06)      MEDICATIONS  (STANDING):  aspirin enteric coated 81 milliGRAM(s) Oral daily  atorvastatin 40 milliGRAM(s) Oral at bedtime  chlorhexidine 2% Cloths 1 Application(s) Topical daily  lacosamide IVPB 100 milliGRAM(s) IV Intermittent every 12 hours  levETIRAcetam  IVPB 1500 milliGRAM(s) IV Intermittent every 12 hours  pantoprazole  Injectable 40 milliGRAM(s) IV Push two times a day  piperacillin/tazobactam IVPB.. 3.375 Gram(s) IV Intermittent every 8 hours    MEDICATIONS  (PRN):  acetaminophen     Tablet .. 650 milliGRAM(s) Oral every 6 hours PRN Temp greater or equal to 38C (100.4F), Mild Pain (1 - 3), Moderate Pain (4 - 6)  oxyCODONE    IR 5 milliGRAM(s) Oral every 6 hours PRN Severe Pain (7 - 10)      ROS  No fever, sweats, chills  No epistaxis, HA, sore throat  No CP, SOB, cough, sputum  No n/v/d, abd pain, melena, hematochezia  No edema  No rash  No anxiety  No back pain, joint pain  No bleeding, bruising  No dysuria, hematuria    Vital Signs Last 24 Hrs  T(C): 36.7 (21 Jan 2023 09:06), Max: 37.1 (20 Jan 2023 15:30)  T(F): 98.1 (21 Jan 2023 09:06), Max: 98.8 (20 Jan 2023 15:30)  HR: 84 (21 Jan 2023 09:06) (84 - 97)  BP: 124/74 (21 Jan 2023 09:06) (107/67 - 132/72)  BP(mean): --  RR: 19 (21 Jan 2023 09:06) (16 - 19)  SpO2: 99% (21 Jan 2023 09:06) (98% - 100%)    Parameters below as of 21 Jan 2023 09:06  Patient On (Oxygen Delivery Method): room air        PE  NAD  Awake, alert  Anicteric, MMM  RRR  CTAB  Abd soft, NT, ND  No c/c/e  No rash grossly                          7.9    12.64 )-----------( 273      ( 21 Jan 2023 06:30 )             23.6       01-21    139  |  103  |  11  ----------------------------<  110<H>  3.7   |  26  |  0.70    Ca    8.0<L>      21 Jan 2023 06:30  Mg     2.00     01-21    TPro  5.6<L>  /  Alb  2.5<L>  /  TBili  0.7  /  DBili  x   /  AST  55<H>  /  ALT  61<H>  /  AlkPhos  153<H>  01-21

## 2023-01-21 NOTE — PROGRESS NOTE ADULT - ASSESSMENT
Patient is a 52-year-old male with a past medical history of CVA/TIA, ASD/PFO?, hyperlipidemia presents emerged department today with altered mental status from his nursing facility.  Patient just had a stay in this hospital when he was found unresponsive at home.  An extensive work-up with no significant etiology found.  There were thoughts that it was related to seizure activity however neurology did not find any seizure activity on their work-up.  Patient went to a nursing facility and rehab.  Was doing well until yesterday his parents noted him to be somewhat confused and having word finding difficulties.  Today his speech was worse when noticed by the wife.  He is also noted to have odd behavior where he was pulling at sheets and the strings of the masks.  He did not have this behavior before.  Only residual deficit from his previous strokes with some random word finding abilities however his speech today is reported to be significantly worse with worse word finding.  The patient denies any pain at this time.  He does acknowledge that he is having trouble speaking.  There is also report from the family and patient is complaining of left calf pain.  This pain is much more severe than it has been in the past.    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

## 2023-01-21 NOTE — PROGRESS NOTE ADULT - ASSESSMENT
52M w hx of multiple CVA in last year with residual deficit of word finding difficulty, recent admission for unresponsiveness [facility] seizure activity @ OSH, who presents with AMS today from nursing facility, found to have acute onset LLE DVT L pop v, L PT v, and L gastroc v cb compartment syndrome of LLE. S/p left 4 quadrant decompression fasciotomy.    Plan:  - Will continue daily dressing changes  - Planned for L BKA possible AKA on Monday   - Consent placed in chart   - Recommend restarting full AC when able   - Abx per primary team     C Team Surgery  #[id number]

## 2023-01-21 NOTE — PATIENT PROFILE ADULT - NSPROGENPREVTRANSF_GEN_A_NUR
no history of blood product transfusion
TBA; not known if patient has stairs at home/unable to perform

## 2023-01-21 NOTE — PROGRESS NOTE ADULT - SUBJECTIVE AND OBJECTIVE BOX
SURGERY  Pager: #[phone]    INTERVAL EVENTS/SUBJECTIVE: No acute events overnight. Dressing changed at bedside, pain well controlled.     ______________________________________________  OBJECTIVE:   T(C): 37.1 (01-21-23 @ 05:07), Max: 37.1 (01-20-23 @ 15:30)  HR: 94 (01-21-23 @ 05:07) (85 - 99)  BP: 132/72 (01-21-23 @ 05:07) (107/67 - 132/72)  RR: 18 (01-21-23 @ 05:07) (16 - 18)  SpO2: 100% (01-21-23 @ 05:07) (98% - 100%)  Wt(kg): --  CAPILLARY BLOOD GLUCOSE        I&O's Detail      Physical exam:  Gen: resting in bed comfortably in NAD  Chest: no increased WOB, regular inspiratory effort   Abdomen: Soft, nontender, nondistended with no rebound tenderness or guarding.  Vascular: LLE fasciotomy sites dry, muscle clean with no purulence, no erythema around skin; continued swelling, improved   NEURO: awake, alert  ______________________________________________  LABS:  CBC Full  -  ( 21 Jan 2023 06:30 )  WBC Count : 12.64 K/uL  RBC Count : 2.68 M/uL  Hemoglobin : 7.9 g/dL  Hematocrit : 23.6 %  Platelet Count - Automated : 273 K/uL  Mean Cell Volume : 88.1 fL  Mean Cell Hemoglobin : 29.5 pg  Mean Cell Hemoglobin Concentration : 33.5 gm/dL  Auto Neutrophil # : x  Auto Lymphocyte # : x  Auto Monocyte # : x  Auto Eosinophil # : x  Auto Basophil # : x  Auto Neutrophil % : x  Auto Lymphocyte % : x  Auto Monocyte % : x  Auto Eosinophil % : x  Auto Basophil % : x    01-21    139  |  103  |  11  ----------------------------<  110<H>  3.7   |  26  |  0.70    Ca    8.0<L>      21 Jan 2023 06:30  Mg     2.00     01-21    TPro  5.6<L>  /  Alb  2.5<L>  /  TBili  0.7  /  DBili  x   /  AST  55<H>  /  ALT  61<H>  /  AlkPhos  153<H>  01-21    _____________________________________________  RADIOLOGY:

## 2023-01-21 NOTE — PROGRESS NOTE ADULT - ASSESSMENT
This is a 52 year old male who presents with altered mental status.      Anemia   -- Pt continues to require transfusions   -- Occult blood negative. Labs show some iron deficiency but elevated ferritin. No evidence of B12/folate deficiency or hemolysis.   -- GI consulted, low suspicion for GI bleed. Pt had colonoscopy last admission w/o bleeding. If he continues to require frequent transfusions, may plan for scope   -- CT a/p negative for RP bleed   --Ordered SPEP and light chains/Quiggs to rule out  plasma cell dyscrasia, will follow  -- Transfuse to maintain hg >7     r/o malignancy   -- Prior admission had MRI with abnormal findings, possible malignancy. LP w/o malignant cells. Colonoscopy with biopsy of polyp lesion showing tubular adenoma. Pelvic LN biopsy requested however risks outweighed benefits.   -- CEA, , AFP wnl. PSA mildly elevated at 5.24.  -- Outpatient follow-up to discuss need for PET/CT     DVT   -- US LE shows new acute LLE DVT compared to recent imaging on 12/31 despite pt on Lovenox  -- Unclear if he was compliant with anticoagulation   -- AT III, protein C/S, Lupus profile negative. Follow up aCL ab, b2GP ab, factor V Leiden, prothrombin gene mt  -- AC on hold for anemia. will need to consider IVC filter if unable to anticoagulate   -- s/p emergent fasciotomy for compartment syndrome      AMS   -- per neurology  -- mental status improved  -- Follow up repeat MRI     Will continue to follow.    Nataly Sen NP  Hematology/Oncology  [facility]  [phone] (Office)  [phone] (Alt office)  Evenings and weekends please call MD on call or office

## 2023-01-22 ENCOUNTER — TRANSCRIPTION ENCOUNTER (OUTPATIENT)
Age: 53
End: 2023-01-22

## 2023-01-22 LAB
ALBUMIN SERPL ELPH-MCNC: 2.5 G/DL — LOW (ref 3.3–5)
ALP SERPL-CCNC: 240 U/L — HIGH (ref 40–120)
ALT FLD-CCNC: 130 U/L — HIGH (ref 4–41)
ANION GAP SERPL CALC-SCNC: 10 MMOL/L — SIGNIFICANT CHANGE UP (ref 7–14)
APTT BLD: 38.3 SEC — HIGH (ref 27–36.3)
APTT BLD: 65.7 SEC — HIGH (ref 27–36.3)
APTT BLD: 90.7 SEC — HIGH (ref 27–36.3)
AST SERPL-CCNC: 110 U/L — HIGH (ref 4–40)
BILIRUB SERPL-MCNC: 0.6 MG/DL — SIGNIFICANT CHANGE UP (ref 0.2–1.2)
BUN SERPL-MCNC: 13 MG/DL — SIGNIFICANT CHANGE UP (ref 7–23)
CALCIUM SERPL-MCNC: 8 MG/DL — LOW (ref 8.4–10.5)
CHLORIDE SERPL-SCNC: 103 MMOL/L — SIGNIFICANT CHANGE UP (ref 98–107)
CO2 SERPL-SCNC: 25 MMOL/L — SIGNIFICANT CHANGE UP (ref 22–31)
CREAT SERPL-MCNC: 0.72 MG/DL — SIGNIFICANT CHANGE UP (ref 0.5–1.3)
EGFR: 110 ML/MIN/1.73M2 — SIGNIFICANT CHANGE UP
GLUCOSE SERPL-MCNC: 108 MG/DL — HIGH (ref 70–99)
HCT VFR BLD CALC: 24.5 % — LOW (ref 39–50)
HCT VFR BLD CALC: 25.8 % — LOW (ref 39–50)
HGB BLD-MCNC: 8.1 G/DL — LOW (ref 13–17)
HGB BLD-MCNC: 8.4 G/DL — LOW (ref 13–17)
MAGNESIUM SERPL-MCNC: 2 MG/DL — SIGNIFICANT CHANGE UP (ref 1.6–2.6)
MCHC RBC-ENTMCNC: 29.3 PG — SIGNIFICANT CHANGE UP (ref 27–34)
MCHC RBC-ENTMCNC: 29.5 PG — SIGNIFICANT CHANGE UP (ref 27–34)
MCHC RBC-ENTMCNC: 32.6 GM/DL — SIGNIFICANT CHANGE UP (ref 32–36)
MCHC RBC-ENTMCNC: 33.1 GM/DL — SIGNIFICANT CHANGE UP (ref 32–36)
MCV RBC AUTO: 88.8 FL — SIGNIFICANT CHANGE UP (ref 80–100)
MCV RBC AUTO: 90.5 FL — SIGNIFICANT CHANGE UP (ref 80–100)
MRSA PCR RESULT.: SIGNIFICANT CHANGE UP
NRBC # BLD: 0 /100 WBCS — SIGNIFICANT CHANGE UP (ref 0–0)
NRBC # BLD: 0 /100 WBCS — SIGNIFICANT CHANGE UP (ref 0–0)
NRBC # FLD: 0 K/UL — SIGNIFICANT CHANGE UP (ref 0–0)
NRBC # FLD: 0 K/UL — SIGNIFICANT CHANGE UP (ref 0–0)
PLATELET # BLD AUTO: 333 K/UL — SIGNIFICANT CHANGE UP (ref 150–400)
PLATELET # BLD AUTO: 368 K/UL — SIGNIFICANT CHANGE UP (ref 150–400)
POTASSIUM SERPL-MCNC: 4.1 MMOL/L — SIGNIFICANT CHANGE UP (ref 3.5–5.3)
POTASSIUM SERPL-SCNC: 4.1 MMOL/L — SIGNIFICANT CHANGE UP (ref 3.5–5.3)
PROT SERPL-MCNC: 5.9 G/DL — LOW (ref 6–8.3)
RBC # BLD: 2.76 M/UL — LOW (ref 4.2–5.8)
RBC # BLD: 2.85 M/UL — LOW (ref 4.2–5.8)
RBC # FLD: 16.1 % — HIGH (ref 10.3–14.5)
RBC # FLD: 16.4 % — HIGH (ref 10.3–14.5)
S AUREUS DNA NOSE QL NAA+PROBE: DETECTED
SARS-COV-2 RNA SPEC QL NAA+PROBE: SIGNIFICANT CHANGE UP
SODIUM SERPL-SCNC: 138 MMOL/L — SIGNIFICANT CHANGE UP (ref 135–145)
WBC # BLD: 10.9 K/UL — HIGH (ref 3.8–10.5)
WBC # BLD: 11.65 K/UL — HIGH (ref 3.8–10.5)
WBC # FLD AUTO: 10.9 K/UL — HIGH (ref 3.8–10.5)
WBC # FLD AUTO: 11.65 K/UL — HIGH (ref 3.8–10.5)

## 2023-01-22 PROCEDURE — 95720 EEG PHY/QHP EA INCR W/VEEG: CPT

## 2023-01-22 RX ORDER — LIDOCAINE 4 G/100G
1 CREAM TOPICAL DAILY
Refills: 0 | Status: DISCONTINUED | OUTPATIENT
Start: 2023-01-22 | End: 2023-02-06

## 2023-01-22 RX ADMIN — LACOSAMIDE 120 MILLIGRAM(S): 50 TABLET ORAL at 17:22

## 2023-01-22 RX ADMIN — OXYCODONE HYDROCHLORIDE 5 MILLIGRAM(S): 5 TABLET ORAL at 22:50

## 2023-01-22 RX ADMIN — PANTOPRAZOLE SODIUM 40 MILLIGRAM(S): 20 TABLET, DELAYED RELEASE ORAL at 05:30

## 2023-01-22 RX ADMIN — OXYCODONE HYDROCHLORIDE 5 MILLIGRAM(S): 5 TABLET ORAL at 21:50

## 2023-01-22 RX ADMIN — PIPERACILLIN AND TAZOBACTAM 25 GRAM(S): 4; .5 INJECTION, POWDER, LYOPHILIZED, FOR SOLUTION INTRAVENOUS at 21:29

## 2023-01-22 RX ADMIN — HEPARIN SODIUM 2000 UNIT(S)/HR: 5000 INJECTION INTRAVENOUS; SUBCUTANEOUS at 15:05

## 2023-01-22 RX ADMIN — PIPERACILLIN AND TAZOBACTAM 25 GRAM(S): 4; .5 INJECTION, POWDER, LYOPHILIZED, FOR SOLUTION INTRAVENOUS at 06:23

## 2023-01-22 RX ADMIN — Medication 81 MILLIGRAM(S): at 11:40

## 2023-01-22 RX ADMIN — OXYCODONE HYDROCHLORIDE 5 MILLIGRAM(S): 5 TABLET ORAL at 12:40

## 2023-01-22 RX ADMIN — OXYCODONE HYDROCHLORIDE 5 MILLIGRAM(S): 5 TABLET ORAL at 04:53

## 2023-01-22 RX ADMIN — HEPARIN SODIUM 2000 UNIT(S)/HR: 5000 INJECTION INTRAVENOUS; SUBCUTANEOUS at 19:19

## 2023-01-22 RX ADMIN — OXYCODONE HYDROCHLORIDE 5 MILLIGRAM(S): 5 TABLET ORAL at 11:40

## 2023-01-22 RX ADMIN — HEPARIN SODIUM 2000 UNIT(S)/HR: 5000 INJECTION INTRAVENOUS; SUBCUTANEOUS at 21:35

## 2023-01-22 RX ADMIN — PANTOPRAZOLE SODIUM 40 MILLIGRAM(S): 20 TABLET, DELAYED RELEASE ORAL at 17:22

## 2023-01-22 RX ADMIN — LACOSAMIDE 120 MILLIGRAM(S): 50 TABLET ORAL at 05:46

## 2023-01-22 RX ADMIN — LIDOCAINE 1 PATCH: 4 CREAM TOPICAL at 19:26

## 2023-01-22 RX ADMIN — HEPARIN SODIUM 2000 UNIT(S)/HR: 5000 INJECTION INTRAVENOUS; SUBCUTANEOUS at 07:13

## 2023-01-22 RX ADMIN — LEVETIRACETAM 400 MILLIGRAM(S): 250 TABLET, FILM COATED ORAL at 05:25

## 2023-01-22 RX ADMIN — LEVETIRACETAM 400 MILLIGRAM(S): 250 TABLET, FILM COATED ORAL at 17:25

## 2023-01-22 RX ADMIN — CHLORHEXIDINE GLUCONATE 1 APPLICATION(S): 213 SOLUTION TOPICAL at 11:41

## 2023-01-22 RX ADMIN — PIPERACILLIN AND TAZOBACTAM 25 GRAM(S): 4; .5 INJECTION, POWDER, LYOPHILIZED, FOR SOLUTION INTRAVENOUS at 12:31

## 2023-01-22 RX ADMIN — LIDOCAINE 1 PATCH: 4 CREAM TOPICAL at 16:38

## 2023-01-22 RX ADMIN — HEPARIN SODIUM 6500 UNIT(S): 5000 INJECTION INTRAVENOUS; SUBCUTANEOUS at 07:09

## 2023-01-22 RX ADMIN — HEPARIN SODIUM 2000 UNIT(S)/HR: 5000 INJECTION INTRAVENOUS; SUBCUTANEOUS at 22:13

## 2023-01-22 RX ADMIN — HEPARIN SODIUM 2000 UNIT(S)/HR: 5000 INJECTION INTRAVENOUS; SUBCUTANEOUS at 08:22

## 2023-01-22 RX ADMIN — HEPARIN SODIUM 2000 UNIT(S)/HR: 5000 INJECTION INTRAVENOUS; SUBCUTANEOUS at 07:03

## 2023-01-22 NOTE — EEG REPORT - NS EEG TEXT BOX
[facility], [place]   COMPREHENSIVE EPILEPSY CENTER   REPORT OF LONG-TERM VIDEO EEG     [facility]: [address], [place] [address], Ph#: [phone]  [facility]: [address] [address], Ph#: [phone]  [facility]: [address] [place] [address], Ph#: [phone]    Patient Name: TAMI DUNHAM  Age and : 52y (70)  MRN #: [id number]  Location: [facility] [address]  Referring Physician: Fito Diez    Start Time/Date: 14:10 on 23  End Time/Date: 08:00 on 23  Duration: 17h 33m    _____________________________________________________________  STUDY INFORMATION    EEG Recording Technique:  The patient underwent continuous Video-EEG monitoring, using Telemetry System hardware on the XLTek Digital System. EEG and video data were stored on a computer hard drive with important events saved in digital archive files. The material was reviewed by a physician (electroencephalographer / epileptologist) on a daily basis. Donnie and seizure detection algorithms were utilized and reviewed. An EEG Technician attended to the patient, and was available throughout daytime work hours.  The epilepsy center neurologist was available in person or on call 24-hours per day.    EEG Placement and Labeling of Electrodes:  The EEG was performed utilizing 20 channel referential EEG connections (coronal over temporal over parasagittal montage) using all standard 10-20 electrode placements with EKG, with additional electrodes placed in the inferior temporal region using the modified 10-10 montage electrode placements for elective admissions, or if deemed necessary. Recording was at a sampling rate of 256 samples per second per channel. Time synchronized digital video recording was done simultaneously with EEG recording. A low light infrared camera was used for low light recording.     _____________________________________________________________  HISTORY    Patient is a 52y old  Male who presents with a chief complaint of anemia (2023 09:06)      PERTINENT MEDICATION:  lacosamide IVPB 100 milliGRAM(s) IV Intermittent every 12 hours  levETIRAcetam  IVPB 1500 milliGRAM(s) IV Intermittent every 12 hours      _____________________________________________________________  STUDY INTERPRETATION    Findings: The background was continuous and reactive. During wakefulness, the posterior dominant rhythm consisted of symmetric, well-modulated 6.5-7 Hz activity, with amplitude to 30 uV, that attenuated to eye opening.      Background Slowing:  Excess diffuse polymorphic delta slowing.    Focal Slowing:   None were present.    Sleep Background:  Drowsiness was characterized by fragmentation, attenuation, and slowing of the background activity.    Sleep was characterized by the presence of symmetric, rudimentary sleep spindles and K-complexes.    Other Non-Epileptiform Findings:  None were present.    Interictal Epileptiform Activity:   None were present.    Events:  No event or seizure recorded.    Activation Procedures:   Hyperventilation was not performed.    Photic stimulation was performed and did not elicit any abnormality.     Artifacts:  Intermittent myogenic and movement artifacts were noted.    ECG:  The heart rate on single channel ECG was predominantly between 70-80 BPM.    _____________________________________________________________  EEG SUMMARY/CLASSIFICATION    Abnormal EEG in the awake, drowsy and asleep states.  - Mild ot moderate generalized slowing.    _____________________________________________________________  EEG IMPRESSION/CLINICAL CORRELATE    Abnormal EEG study.  1. Mild to moderate nonspecific diffuse or multifocal cerebral dysfunction.   2. No epileptiform pattern or seizure seen.    _____________________________________________________________    Michelle Howard MD  Director, Epilepsy/[facility]

## 2023-01-22 NOTE — PROGRESS NOTE ADULT - ASSESSMENT
This is a 52 year old male who presents with altered mental status.      Anemia   -- Pt continues to require transfusions , H&H  8.1/24.5 MCV 88 1/22  -- Occult blood negative. Labs show some iron deficiency but elevated ferritin. No evidence of B12/folate deficiency or hemolysis.   -- GI consulted, low suspicion for GI bleed. Pt had colonoscopy last admission w/o bleeding. If he continues to require frequent transfusions, may plan for scope   -- CT a/p negative for RP bleed   --Ordered SPEP and light chains/Quiggs to rule out  plasma cell dyscrasia, will follow  -- Transfuse to maintain hg >7     r/o malignancy   -- Prior admission had MRI with abnormal findings, possible malignancy. LP w/o malignant cells. Colonoscopy with biopsy of polyp lesion showing tubular adenoma. Pelvic LN biopsy requested however risks outweighed benefits.   -- CEA, , AFP wnl. PSA mildly elevated at 5.24.  -- Outpatient follow-up to discuss need for PET/CT     DVT   -- US LE shows new acute LLE DVT compared to recent imaging on 12/31 despite pt on Lovenox  -- Unclear if he was compliant with anticoagulation   -- AT III, protein C/S, Lupus profile negative. Follow up aCL ab, b2GP ab, factor V Leiden, prothrombin gene mt  -- AC on hold for anemia. will need to consider IVC filter if unable to anticoagulate   -- s/p emergent fasciotomy for compartment syndrome      AMS   -- EEG ongoing, without noted seizures,per neurology  -- mental status improving  -- MRI pending    Will continue to follow.    Nataly Sen NP  Hematology/Oncology  [facility]  [phone] (Office)  [phone] (Alt office)  Evenings and weekends please call MD on call or office       This is a 52 year old male who presents with altered mental status.      Anemia   -- Pt continues to require transfusions ,S/P 1 unit 1/20 post H&H  8.1/24.5 MCV 88 (Total units received 3)  -- Occult blood negative. Labs show some iron deficiency but elevated ferritin. No evidence of B12/folate deficiency or hemolysis.   -- GI consulted, low suspicion for GI bleed. Pt had colonoscopy last admission w/o bleeding. If he continues to require frequent transfusions, may plan for scope  1/23 if pt stable  -- CT a/p negative for RP bleed   --Ordered SPEP and light chains/Quiggs to rule out  plasma cell dyscrasia on 1/21, results pending  -- Transfuse to maintain hg >7     r/o malignancy   -- Prior admission had MRI with abnormal findings, possible malignancy. LP w/o malignant cells.  -- Colonoscopy with biopsy of polyp lesion showing tubular adenoma. Pelvic LN biopsy requested however risks outweighed benefits.   -- CEA, , AFP wnl. PSA mildly elevated at 5.24.  -- Outpatient follow-up to discuss need for PET/CT     DVT   -- US LE shows new acute LLE DVT compared to recent imaging on 12/31 despite pt on Lovenox  -- Unclear if he was compliant with anticoagulation   -- AT III, protein C/S, Lupus profile negative, beta 2 neg. Follow up aCL ab,  factor V Leiden, prothrombin gene mt  -- AC on hold for anemia. will need to consider IVC filter if unable to anticoagulate   -- s/p emergent fasciotomy for compartment syndrome      AMS   -- EEG ongoing, without noted seizures,per neurology  --on Keppra  -- mental status improving  -- MRI W/W0 contrast pending    Will continue to follow.    Nataly Sen NP  Hematology/Oncology  [facility]  [phone] (Office)  [phone] (Alt office)  Evenings and weekends please call MD on call or office

## 2023-01-22 NOTE — PROGRESS NOTE ADULT - SUBJECTIVE AND OBJECTIVE BOX
Name of Patient : TAMI DUNHAM  MRN: [id number]  Date of visit: 01-22-23 @ 19:17      Subjective: Patient seen and examined. No new events except as noted.   Doing okay  hgb stable     REVIEW OF SYSTEMS:    CONSTITUTIONAL: No weakness, fevers or chills  EYES/ENT: No visual changes;  No vertigo or throat pain   NECK: No pain or stiffness  RESPIRATORY: No cough, wheezing, hemoptysis; No shortness of breath  CARDIOVASCULAR: No chest pain or palpitations  GASTROINTESTINAL: No abdominal or epigastric pain. No nausea, vomiting, or hematemesis; No diarrhea or constipation. No melena or hematochezia.  GENITOURINARY: No dysuria, frequency or hematuria  NEUROLOGICAL: No numbness or weakness  SKIN: No itching, burning, rashes, or lesions   All other review of systems is negative unless indicated above.    MEDICATIONS:  MEDICATIONS  (STANDING):  aspirin enteric coated 81 milliGRAM(s) Oral daily  chlorhexidine 2% Cloths 1 Application(s) Topical daily  heparin  Infusion.  Unit(s)/Hr (14 mL/Hr) IV Continuous <Continuous>  lacosamide IVPB 100 milliGRAM(s) IV Intermittent every 12 hours  levETIRAcetam  IVPB 1500 milliGRAM(s) IV Intermittent every 12 hours  lidocaine   4% Patch 1 Patch Transdermal daily  pantoprazole  Injectable 40 milliGRAM(s) IV Push two times a day  piperacillin/tazobactam IVPB.. 3.375 Gram(s) IV Intermittent every 8 hours      PHYSICAL EXAM:  T(C): 36.8 (01-22-23 @ 17:49), Max: 36.8 (01-22-23 @ 17:49)  HR: 92 (01-22-23 @ 17:49) (86 - 96)  BP: 126/70 (01-22-23 @ 17:49) (126/68 - 130/77)  RR: 19 (01-22-23 @ 17:49) (17 - 19)  SpO2: 100% (01-22-23 @ 17:49) (99% - 100%)  Wt(kg): --  I&O's Summary        Appearance: Normal	  HEENT:  PERRLA   Lymphatic: No lymphadenopathy   Cardiovascular: Normal S1 S2, no JVD  Respiratory: normal effort , clear  Gastrointestinal:  Soft, Non-tender  Skin: No rashes,  warm to touch  Psychiatry:  Mood & affect appropriate  Musculuskeletal: LLE swelling, poor motor function, dressing       All labs, Imaging and EKGs personally reviewed                             8.4    11.65 )-----------( 368      ( 22 Jan 2023 13:25 )             25.8               01-22    138  |  103  |  13  ----------------------------<  108<H>  4.1   |  25  |  0.72    Ca    8.0<L>      22 Jan 2023 05:38  Mg     2.00     01-22    TPro  5.9<L>  /  Alb  2.5<L>  /  TBili  0.6  /  DBili  x   /  AST  110<H>  /  ALT  130<H>  /  AlkPhos  240<H>  01-22    PTT - ( 22 Jan 2023 13:25 )  PTT:90.7 sec

## 2023-01-22 NOTE — PROGRESS NOTE ADULT - NS ATTEND AMEND GEN_ALL_CORE FT
chart reviewed, d/w NP, pt, and family at bedside. Family states that his mental status is improved significantly, pt able to verbalize most things, though slow processing. His hgb remains stable, baseline hgb 11-12s last admission, nml last office visit, suspect acute anemia related to compartment syndrome and now s/p surgery. Will f/u SPEP and CALLY but low suspicion for acute heme process. Now planned for amputation, defer to vascular, transfuse PRBC as needed.

## 2023-01-22 NOTE — PROGRESS NOTE ADULT - SUBJECTIVE AND OBJECTIVE BOX
Patient is a 52y old  Male who presents with a chief complaint of anemia (20 Jan 2023 09:06)      MEDICATIONS  (STANDING):  aspirin enteric coated 81 milliGRAM(s) Oral daily  atorvastatin 40 milliGRAM(s) Oral at bedtime  chlorhexidine 2% Cloths 1 Application(s) Topical daily  heparin  Infusion.  Unit(s)/Hr (14 mL/Hr) IV Continuous <Continuous>  lacosamide IVPB 100 milliGRAM(s) IV Intermittent every 12 hours  levETIRAcetam  IVPB 1500 milliGRAM(s) IV Intermittent every 12 hours  pantoprazole  Injectable 40 milliGRAM(s) IV Push two times a day  piperacillin/tazobactam IVPB.. 3.375 Gram(s) IV Intermittent every 8 hours    MEDICATIONS  (PRN):  acetaminophen     Tablet .. 650 milliGRAM(s) Oral every 6 hours PRN Temp greater or equal to 38C (100.4F), Mild Pain (1 - 3), Moderate Pain (4 - 6)  heparin   Injectable 6500 Unit(s) IV Push every 6 hours PRN For aPTT less than 40  heparin   Injectable 3000 Unit(s) IV Push every 6 hours PRN For aPTT between 40 - 57  oxyCODONE    IR 5 milliGRAM(s) Oral every 6 hours PRN Severe Pain (7 - 10)      ROS  No fever, sweats, chills  No epistaxis, HA, sore throat  No CP, SOB, cough, sputum  No n/v/d, abd pain, melena, hematochezia  No edema  No rash  No anxiety  No back pain, joint pain  No bleeding, bruising  No dysuria, hematuria    Vital Signs Last 24 Hrs  T(C): 36.6 (22 Jan 2023 05:46), Max: 36.9 (21 Jan 2023 18:30)  T(F): 97.8 (22 Jan 2023 05:46), Max: 98.4 (21 Jan 2023 18:30)  HR: 87 (22 Jan 2023 05:46) (84 - 91)  BP: 130/77 (22 Jan 2023 05:46) (124/74 - 130/77)  BP(mean): --  RR: 18 (22 Jan 2023 05:46) (17 - 19)  SpO2: 100% (22 Jan 2023 05:46) (99% - 100%)    Parameters below as of 22 Jan 2023 05:46  Patient On (Oxygen Delivery Method): room air        PE  NAD, ongoing EEG   asleep but arousable  Anicteric, MMM  RRR  CTAB  Abd soft, NT, ND  No c/c/e  No rash grossly                            8.1    10.90 )-----------( 333      ( 22 Jan 2023 05:38 )             24.5       01-22    138  |  103  |  13  ----------------------------<  108<H>  4.1   |  25  |  0.72    Ca    8.0<L>      22 Jan 2023 05:38  Mg     2.00     01-22    TPro  5.9<L>  /  Alb  2.5<L>  /  TBili  0.6  /  DBili  x   /  AST  110<H>  /  ALT  130<H>  /  AlkPhos  240<H>  01-22

## 2023-01-22 NOTE — PROGRESS NOTE ADULT - ASSESSMENT
Patient is a 52-year-old male with a past medical history of CVA/TIA, ASD/PFO?, hyperlipidemia presents emerged department today with altered mental status from his nursing facility.  Patient just had a stay in this hospital when he was found unresponsive at home.  An extensive work-up with no significant etiology found.  There were thoughts that it was related to seizure activity however neurology did not find any seizure activity on their work-up.  Patient went to a nursing facility and rehab.  Was doing well until yesterday his parents noted him to be somewhat confused and having word finding difficulties.  Today his speech was worse when noticed by the wife.  He is also noted to have odd behavior where he was pulling at sheets and the strings of the masks.  He did not have this behavior before.  Only residual deficit from his previous strokes with some random word finding abilities however his speech today is reported to be significantly worse with worse word finding.  The patient denies any pain at this time.  He does acknowledge that he is having trouble speaking.  There is also report from the family and patient is complaining of left calf pain.  This pain is much more severe than it has been in the past.    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, SP LP, no malignancy on cyto   tele monitoring   Plan for MRI per Neuro recs, ordered    # Lekucytosis  Pan CX   Infectious W/U   CTA negative   ID eval appreciated    Hematology eval appreciated, follow up recs     #  Compartment syndrome   S/P Fasciotomy   Vascular follow up appreciated   dressing per vascular follow up recs   ID eval appreciated, cont zosyn,   Plan for amputation per vascular surg , BKA , patient is medically optimzied for OR, hold AC   Resume AC, Hgb stable     Discussed with patient's paretner, was told patient had a fall episodes at the rehab however did not tell the staff when happened,       # ANemia  Occult negative  Total of 3 units PRBC  serial CBC   GI follow up appreciated          Patient is a 52-year-old male with a past medical history of CVA/TIA, ASD/PFO?, hyperlipidemia presents emerged department today with altered mental status from his nursing facility.  Patient just had a stay in this hospital when he was found unresponsive at home.  An extensive work-up with no significant etiology found.  There were thoughts that it was related to seizure activity however neurology did not find any seizure activity on their work-up.  Patient went to a nursing facility and rehab.  Was doing well until yesterday his parents noted him to be somewhat confused and having word finding difficulties.  Today his speech was worse when noticed by the wife.  He is also noted to have odd behavior where he was pulling at sheets and the strings of the masks.  He did not have this behavior before.  Only residual deficit from his previous strokes with some random word finding abilities however his speech today is reported to be significantly worse with worse word finding.  The patient denies any pain at this time.  He does acknowledge that he is having trouble speaking.  There is also report from the family and patient is complaining of left calf pain.  This pain is much more severe than it has been in the past.    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, SP LP, no malignancy on cyto   tele monitoring   Plan for MRI per Neuro recs, ordered    # Lekucytosis  Pan CX   Infectious W/U   CTA negative   ID eval appreciated    Hematology eval appreciated, follow up recs     #  Compartment syndrome   S/P Fasciotomy   Vascular follow up appreciated   dressing per vascular follow up recs   ID eval appreciated, cont zosyn,   Plan for amputation per vascular surg , BKA , patient is medically optimzied for OR, hold AC   Resume AC, Hgb stable     Discussed with patient's paretner, was told patient had a fall episodes at the rehab however did not tell the staff when happened,     # Elevated LFTs  trending up  hold tyleniol and statin  trend for now  if cont to trend up, will obtain US   likely due to acute anemia       # ANemia  Occult negative  Total of 3 units PRBC  serial CBC   GI follow up appreciated

## 2023-01-23 LAB
ALBUMIN SERPL ELPH-MCNC: 2.5 G/DL — LOW (ref 3.3–5)
ALP SERPL-CCNC: 204 U/L — HIGH (ref 40–120)
ALT FLD-CCNC: 97 U/L — HIGH (ref 4–41)
ANION GAP SERPL CALC-SCNC: 6 MMOL/L — LOW (ref 7–14)
APTT BLD: 70.3 SEC — HIGH (ref 27–36.3)
AST SERPL-CCNC: 49 U/L — HIGH (ref 4–40)
BILIRUB DIRECT SERPL-MCNC: 0.2 MG/DL — SIGNIFICANT CHANGE UP (ref 0–0.3)
BILIRUB INDIRECT FLD-MCNC: 0.5 MG/DL — SIGNIFICANT CHANGE UP (ref 0–1)
BILIRUB SERPL-MCNC: 0.7 MG/DL — SIGNIFICANT CHANGE UP (ref 0.2–1.2)
BLD GP AB SCN SERPL QL: NEGATIVE — SIGNIFICANT CHANGE UP
BUN SERPL-MCNC: 8 MG/DL — SIGNIFICANT CHANGE UP (ref 7–23)
CALCIUM SERPL-MCNC: 8.3 MG/DL — LOW (ref 8.4–10.5)
CHLORIDE SERPL-SCNC: 101 MMOL/L — SIGNIFICANT CHANGE UP (ref 98–107)
CO2 SERPL-SCNC: 27 MMOL/L — SIGNIFICANT CHANGE UP (ref 22–31)
CREAT SERPL-MCNC: 0.69 MG/DL — SIGNIFICANT CHANGE UP (ref 0.5–1.3)
CULTURE RESULTS: SIGNIFICANT CHANGE UP
CULTURE RESULTS: SIGNIFICANT CHANGE UP
DNA PLOIDY SPEC FC-IMP: SIGNIFICANT CHANGE UP
EGFR: 111 ML/MIN/1.73M2 — SIGNIFICANT CHANGE UP
GLUCOSE SERPL-MCNC: 128 MG/DL — HIGH (ref 70–99)
HCT VFR BLD CALC: 26.3 % — LOW (ref 39–50)
HGB BLD-MCNC: 8.5 G/DL — LOW (ref 13–17)
INR BLD: 1.11 RATIO — SIGNIFICANT CHANGE UP (ref 0.88–1.16)
KAPPA LC SER QL IFE: 2.87 MG/DL — HIGH (ref 0.33–1.94)
KAPPA/LAMBDA FREE LIGHT CHAIN RATIO, SERUM: 1.32 RATIO — SIGNIFICANT CHANGE UP (ref 0.26–1.65)
LAMBDA LC SER QL IFE: 2.18 MG/DL — SIGNIFICANT CHANGE UP (ref 0.57–2.63)
MAGNESIUM SERPL-MCNC: 2.1 MG/DL — SIGNIFICANT CHANGE UP (ref 1.6–2.6)
MCHC RBC-ENTMCNC: 29.2 PG — SIGNIFICANT CHANGE UP (ref 27–34)
MCHC RBC-ENTMCNC: 32.3 GM/DL — SIGNIFICANT CHANGE UP (ref 32–36)
MCV RBC AUTO: 90.4 FL — SIGNIFICANT CHANGE UP (ref 80–100)
NRBC # BLD: 0 /100 WBCS — SIGNIFICANT CHANGE UP (ref 0–0)
NRBC # FLD: 0 K/UL — SIGNIFICANT CHANGE UP (ref 0–0)
PHOSPHATE SERPL-MCNC: 3.9 MG/DL — SIGNIFICANT CHANGE UP (ref 2.5–4.5)
PLATELET # BLD AUTO: 370 K/UL — SIGNIFICANT CHANGE UP (ref 150–400)
POTASSIUM SERPL-MCNC: 3.7 MMOL/L — SIGNIFICANT CHANGE UP (ref 3.5–5.3)
POTASSIUM SERPL-SCNC: 3.7 MMOL/L — SIGNIFICANT CHANGE UP (ref 3.5–5.3)
PROT SERPL-MCNC: 5.9 G/DL — LOW (ref 6–8.3)
PROTHROM AB SERPL-ACNC: 12.9 SEC — SIGNIFICANT CHANGE UP (ref 10.5–13.4)
PTR INTERPRETATION: SIGNIFICANT CHANGE UP
RBC # BLD: 2.91 M/UL — LOW (ref 4.2–5.8)
RBC # FLD: 15.9 % — HIGH (ref 10.3–14.5)
RH IG SCN BLD-IMP: NEGATIVE — SIGNIFICANT CHANGE UP
SODIUM SERPL-SCNC: 134 MMOL/L — LOW (ref 135–145)
SPECIMEN SOURCE: SIGNIFICANT CHANGE UP
SPECIMEN SOURCE: SIGNIFICANT CHANGE UP
WBC # BLD: 11.33 K/UL — HIGH (ref 3.8–10.5)
WBC # FLD AUTO: 11.33 K/UL — HIGH (ref 3.8–10.5)

## 2023-01-23 PROCEDURE — 95718 EEG PHYS/QHP 2-12 HR W/VEEG: CPT

## 2023-01-23 PROCEDURE — 11046 DBRDMT MUSC&/FSCA EA ADDL: CPT

## 2023-01-23 PROCEDURE — 11043 DBRDMT MUSC&/FSCA 1ST 20/<: CPT | Mod: 79

## 2023-01-23 RX ORDER — MUPIROCIN 20 MG/G
1 OINTMENT TOPICAL
Refills: 0 | Status: DISCONTINUED | OUTPATIENT
Start: 2023-01-23 | End: 2023-02-06

## 2023-01-23 RX ORDER — MUPIROCIN 20 MG/G
1 OINTMENT TOPICAL
Refills: 0 | Status: DISCONTINUED | OUTPATIENT
Start: 2023-01-23 | End: 2023-01-23

## 2023-01-23 RX ADMIN — OXYCODONE HYDROCHLORIDE 5 MILLIGRAM(S): 5 TABLET ORAL at 04:17

## 2023-01-23 RX ADMIN — LACOSAMIDE 120 MILLIGRAM(S): 50 TABLET ORAL at 05:09

## 2023-01-23 RX ADMIN — PIPERACILLIN AND TAZOBACTAM 25 GRAM(S): 4; .5 INJECTION, POWDER, LYOPHILIZED, FOR SOLUTION INTRAVENOUS at 13:21

## 2023-01-23 RX ADMIN — LEVETIRACETAM 400 MILLIGRAM(S): 250 TABLET, FILM COATED ORAL at 05:43

## 2023-01-23 RX ADMIN — PIPERACILLIN AND TAZOBACTAM 25 GRAM(S): 4; .5 INJECTION, POWDER, LYOPHILIZED, FOR SOLUTION INTRAVENOUS at 21:51

## 2023-01-23 RX ADMIN — LIDOCAINE 1 PATCH: 4 CREAM TOPICAL at 11:30

## 2023-01-23 RX ADMIN — PANTOPRAZOLE SODIUM 40 MILLIGRAM(S): 20 TABLET, DELAYED RELEASE ORAL at 05:10

## 2023-01-23 RX ADMIN — MUPIROCIN 1 APPLICATION(S): 20 OINTMENT TOPICAL at 21:56

## 2023-01-23 RX ADMIN — OXYCODONE HYDROCHLORIDE 5 MILLIGRAM(S): 5 TABLET ORAL at 05:17

## 2023-01-23 RX ADMIN — PIPERACILLIN AND TAZOBACTAM 25 GRAM(S): 4; .5 INJECTION, POWDER, LYOPHILIZED, FOR SOLUTION INTRAVENOUS at 05:07

## 2023-01-23 RX ADMIN — OXYCODONE HYDROCHLORIDE 5 MILLIGRAM(S): 5 TABLET ORAL at 17:33

## 2023-01-23 RX ADMIN — OXYCODONE HYDROCHLORIDE 5 MILLIGRAM(S): 5 TABLET ORAL at 18:03

## 2023-01-23 RX ADMIN — LACOSAMIDE 120 MILLIGRAM(S): 50 TABLET ORAL at 18:06

## 2023-01-23 RX ADMIN — OXYCODONE HYDROCHLORIDE 5 MILLIGRAM(S): 5 TABLET ORAL at 11:28

## 2023-01-23 RX ADMIN — CHLORHEXIDINE GLUCONATE 1 APPLICATION(S): 213 SOLUTION TOPICAL at 11:29

## 2023-01-23 RX ADMIN — PANTOPRAZOLE SODIUM 40 MILLIGRAM(S): 20 TABLET, DELAYED RELEASE ORAL at 18:01

## 2023-01-23 RX ADMIN — LEVETIRACETAM 400 MILLIGRAM(S): 250 TABLET, FILM COATED ORAL at 18:01

## 2023-01-23 RX ADMIN — Medication 81 MILLIGRAM(S): at 11:29

## 2023-01-23 RX ADMIN — LIDOCAINE 1 PATCH: 4 CREAM TOPICAL at 04:05

## 2023-01-23 RX ADMIN — MUPIROCIN 1 APPLICATION(S): 20 OINTMENT TOPICAL at 06:18

## 2023-01-23 NOTE — PROGRESS NOTE ADULT - SUBJECTIVE AND OBJECTIVE BOX
53 yo male presented to hospital from rehab with left leg swelling, pain and altered mental status  he was found to have left leg compartment syndrome and was taken to the OR emergently  given pt history of CVA and altered mental status consent was obtained from his mother, Elizabeth.  in the OR left leg posterior and anterior muscles were dusky and necrotic  fasciotomy was performed   wound were debrided and packed    the patient returns to the OR today for above knee amputation given extend of muscle necrosis    consent was obtained for his mother elizabeth    all questions were answered

## 2023-01-23 NOTE — PROGRESS NOTE ADULT - NS ATTEND AMEND GEN_ALL_CORE FT
53 y/o male admitted with anemia, altered mental status, with compartment syndrome s/p fasciotomy. Now to OR today for below-knee amputation. Maintain adequate hemoglobin. Pending plasma cell neoplasm workup. Follow-up neurology evaluations.

## 2023-01-23 NOTE — PROGRESS NOTE ADULT - ASSESSMENT
Patient is a 52-year-old male with a past medical history of CVA/TIA, ASD/PFO?, hyperlipidemia presents emerged department today with altered mental status from his nursing facility.  Patient just had a stay in this hospital when he was found unresponsive at home.  An extensive work-up with no significant etiology found.  There were thoughts that it was related to seizure activity however neurology did not find any seizure activity on their work-up.  Patient went to a nursing facility and rehab.  Was doing well until yesterday his parents noted him to be somewhat confused and having word finding difficulties.  Today his speech was worse when noticed by the wife.  He is also noted to have odd behavior where he was pulling at sheets and the strings of the masks.  He did not have this behavior before.  Only residual deficit from his previous strokes with some random word finding abilities however his speech today is reported to be significantly worse with worse word finding.  The patient denies any pain at this time.  He does acknowledge that he is having trouble speaking.  There is also report from the family and patient is complaining of left calf pain.  This pain is much more severe than it has been in the past.    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, SP LP, no malignancy on cyto   tele monitoring   Plan for MRI per Neuro recs, ordered    # Lekucytosis  Pan CX   Infectious W/U   CTA negative   ID eval appreciated    Hematology eval appreciated, follow up recs     #  Compartment syndrome   S/P Fasciotomy   Vascular follow up appreciated   dressing per vascular follow up recs   ID eval appreciated, cont zosyn,   Plan for amputation per vascular surg , BKA , patient is medically optimzied for OR, hold AC   Resume AC, Hgb stable     Discussed with patient's paretner, was told patient had a fall episodes at the rehab however did not tell the staff when happened,     # Elevated LFTs  trending up  hold tyleniol and statin  trend for now  if cont to trend up, will obtain US   likely due to acute anemia       # ANemia  Occult negative  Total of 3 units PRBC  serial CBC   GI follow up appreciated

## 2023-01-23 NOTE — PROGRESS NOTE ADULT - SUBJECTIVE AND OBJECTIVE BOX
HPI:  Patient is a 52-year-old male with a past medical history of CVA/TIA, ASD/PFO?, hyperlipidemia presents emerged department today with altered mental status from his nursing facility.  Patient just had a stay in this hospital when he was found unresponsive at home.  An extensive work-up with no significant etiology found.  There were thoughts that it was related to seizure activity however neurology did not find any seizure activity on their work-up.  Patient went to a nursing facility and rehab.  Was doing well until yesterday his parents noted him to be somewhat confused and having word finding difficulties.  Today his speech was worse when noticed by the wife.  He is also noted to have odd behavior where he was pulling at sheets and the strings of the masks.  He did not have this behavior before.  Only residual deficit from his previous strokes with some random word finding abilities however his speech today is reported to be significantly worse with worse word finding.  The patient denies any pain at this time.  He does acknowledge that he is having trouble speaking.  There is also report from the family and patient is complaining of left calf pain.  This pain is much more severe than it has been in the past. (18 Jan 2023 10:06)    Patient seen this afternoon. He feels fine, has no new complaints. Awaiting OR.     PAST MEDICAL & SURGICAL HISTORY:  History of TIAs      CVA (cerebrovascular accident)      HLD (hyperlipidemia)      Vertigo      Unresponsiveness      No significant past surgical history          FAMILY HISTORY:      Alochol: Denied  Smoking: Nonsmoker  Drug Use: Denied  Marital Status:         Allergies    No Known Allergies    Intolerances        MEDICATIONS  (STANDING):  aspirin enteric coated 81 milliGRAM(s) Oral daily  chlorhexidine 2% Cloths 1 Application(s) Topical daily  lacosamide IVPB 100 milliGRAM(s) IV Intermittent every 12 hours  levETIRAcetam  IVPB 1500 milliGRAM(s) IV Intermittent every 12 hours  lidocaine   4% Patch 1 Patch Transdermal daily  mupirocin 2% Ointment 1 Application(s) Topical two times a day  pantoprazole  Injectable 40 milliGRAM(s) IV Push two times a day  piperacillin/tazobactam IVPB.. 3.375 Gram(s) IV Intermittent every 8 hours    MEDICATIONS  (PRN):  heparin   Injectable 6500 Unit(s) IV Push every 6 hours PRN For aPTT less than 40  heparin   Injectable 3000 Unit(s) IV Push every 6 hours PRN For aPTT between 40 - 57  oxyCODONE    IR 5 milliGRAM(s) Oral every 6 hours PRN Severe Pain (7 - 10)        T(C): 36.6 (01-23-23 @ 09:30), Max: 37.1 (01-23-23 @ 05:00)  HR: 80 (01-23-23 @ 09:30) (80 - 92)  BP: 115/76 (01-23-23 @ 09:30) (106/65 - 126/70)  RR: 18 (01-23-23 @ 09:30) (18 - 19)  SpO2: 100% (01-23-23 @ 09:30) (98% - 100%)  Wt(kg): --    PE  NAD  Awake, alert. +EEG leads still on head  Anicteric, MMM  RRR  CTAB  Abd soft, NT, ND, RLQ hematoma remains within drawn borders   LLE significant edema  No rash grossly                            8.5    11.33 )-----------( 370      ( 23 Jan 2023 00:55 )             26.3       01-23    134<L>  |  101  |  8   ----------------------------<  128<H>  3.7   |  27  |  0.69    Ca    8.3<L>      23 Jan 2023 00:55  Phos  3.9     01-23  Mg     2.10     01-23    TPro  5.9<L>  /  Alb  2.5<L>  /  TBili  0.7  /  DBili  0.2  /  AST  49<H>  /  ALT  97<H>  /  AlkPhos  204<H>  01-23

## 2023-01-23 NOTE — PROGRESS NOTE ADULT - SUBJECTIVE AND OBJECTIVE BOX
Name of Patient : TAMI DUNHAM  MRN: [id number]  Date of visit: 01-23-23       Subjective: Patient seen and examined. No new events except as noted.   Doing okay OR today     REVIEW OF SYSTEMS:    CONSTITUTIONAL: No weakness, fevers or chills  EYES/ENT: No visual changes;  No vertigo or throat pain   NECK: No pain or stiffness  RESPIRATORY: No cough, wheezing, hemoptysis; No shortness of breath  CARDIOVASCULAR: No chest pain or palpitations  GASTROINTESTINAL: No abdominal or epigastric pain. No nausea, vomiting, or hematemesis; No diarrhea or constipation. No melena or hematochezia.  GENITOURINARY: No dysuria, frequency or hematuria  NEUROLOGICAL: No numbness or weakness  SKIN: No itching, burning, rashes, or lesions   All other review of systems is negative unless indicated above.    MEDICATIONS:  MEDICATIONS  (STANDING):  aspirin enteric coated 81 milliGRAM(s) Oral daily  chlorhexidine 2% Cloths 1 Application(s) Topical daily  lacosamide IVPB 100 milliGRAM(s) IV Intermittent every 12 hours  levETIRAcetam  IVPB 1500 milliGRAM(s) IV Intermittent every 12 hours  lidocaine   4% Patch 1 Patch Transdermal daily  mupirocin 2% Ointment 1 Application(s) Topical two times a day  pantoprazole  Injectable 40 milliGRAM(s) IV Push two times a day  piperacillin/tazobactam IVPB.. 3.375 Gram(s) IV Intermittent every 8 hours      PHYSICAL EXAM:  T(C): 36.4 (01-23-23 @ 20:00), Max: 37.1 (01-23-23 @ 05:00)  HR: 79 (01-23-23 @ 20:00) (73 - 88)  BP: 110/66 (01-23-23 @ 20:00) (103/72 - 131/77)  RR: 18 (01-23-23 @ 20:00) (12 - 20)  SpO2: 97% (01-23-23 @ 20:00) (94% - 100%)  Wt(kg): --  I&O's Summary    22 Jan 2023 07:01  -  23 Jan 2023 07:00  --------------------------------------------------------  IN: 500 mL / OUT: 2575 mL / NET: -2075 mL      Height (cm): 190.5 (01-23 @ 14:24)  Weight (kg): 79.4 (01-23 @ 14:24)  BMI (kg/m2): 21.9 (01-23 @ 14:24)  BSA (m2): 2.07 (01-23 @ 14:24)    Appearance: Normal	  HEENT:  PERRLA   Lymphatic: No lymphadenopathy   Cardiovascular: Normal S1 S2, no JVD  Respiratory: normal effort , clear  Gastrointestinal:  Soft, Non-tender  Skin: No rashes,  warm to touch  Psychiatry:  Mood & affect appropriate  Musculuskeletal: LE dressing       All labs, Imaging and EKGs personally reviewed     01-22-23 @ 07:01  -  01-23-23 @ 07:00  --------------------------------------------------------  IN: 500 mL / OUT: 2575 mL / NET: -2075 mL                          8.5    11.33 )-----------( 370      ( 23 Jan 2023 00:55 )             26.3               01-23    134<L>  |  101  |  8   ----------------------------<  128<H>  3.7   |  27  |  0.69    Ca    8.3<L>      23 Jan 2023 00:55  Phos  3.9     01-23  Mg     2.10     01-23    TPro  5.9<L>  /  Alb  2.5<L>  /  TBili  0.7  /  DBili  0.2  /  AST  49<H>  /  ALT  97<H>  /  AlkPhos  204<H>  01-23    PT/INR - ( 23 Jan 2023 00:55 )   PT: 12.9 sec;   INR: 1.11 ratio         PTT - ( 23 Jan 2023 00:55 )  PTT:70.3 sec

## 2023-01-23 NOTE — EEG REPORT - NS EEG TEXT BOX
[facility]   COMPREHENSIVE EPILEPSY CENTER   REPORT OF LONG-TERM VIDEO EEG     [facility]: [address], [place] [address], Ph#: [phone]  [facility]: [address] [place] [address], Ph#: [phone]  [facility]: [address] [place] [address], Ph#: [phone]    Patient Name: TAMI DUNHAM  Age and : 52y (70)  MRN #: [id number]  Location: [facility] [address]  Referring Physician: Fito Diez    Start Time/Date:0800 on 23  End Time/Date: 08:00 on 23  Duration: 24H  _____________________________________________________________  STUDY INFORMATION    EEG Recording Technique:  The patient underwent continuous Video-EEG monitoring, using Telemetry System hardware on the XLTek Digital System. EEG and video data were stored on a computer hard drive with important events saved in digital archive files. The material was reviewed by a physician (electroencephalographer / epileptologist) on a daily basis. Donnie and seizure detection algorithms were utilized and reviewed. An EEG Technician attended to the patient, and was available throughout daytime work hours.  The epilepsy center neurologist was available in person or on call 24-hours per day.    EEG Placement and Labeling of Electrodes:  The EEG was performed utilizing 20 channel referential EEG connections (coronal over temporal over parasagittal montage) using all standard 10-20 electrode placements with EKG, with additional electrodes placed in the inferior temporal region using the modified 10-10 montage electrode placements for elective admissions, or if deemed necessary. Recording was at a sampling rate of 256 samples per second per channel. Time synchronized digital video recording was done simultaneously with EEG recording. A low light infrared camera was used for low light recording.     _____________________________________________________________  HISTORY    Patient is a 52y old  Male who presents with a chief complaint of anemia (2023 09:06)    _____________________________________________________________  STUDY INTERPRETATION    Findings: The background was continuous and reactive. During wakefulness, the posterior dominant rhythm consisted of symmetric, well-modulated 6.5-7 Hz activity, with amplitude to 30 uV, that attenuated to eye opening.      Background Slowing:  Excess diffuse polymorphic delta slowing.    Focal Slowing:   None were present.    Sleep Background:  Drowsiness was characterized by fragmentation, attenuation, and slowing of the background activity.    Sleep was characterized by the presence of symmetric, rudimentary sleep spindles and K-complexes.    Other Non-Epileptiform Findings:  None were present.    Interictal Epileptiform Activity:   None were present.    Events:  No event or seizure recorded.    Activation Procedures:   Hyperventilation was not performed.    Photic stimulation was performed and did not elicit any abnormality.     Artifacts:  Intermittent myogenic and movement artifacts were noted.    ECG:  The heart rate on single channel ECG was predominantly between 70-80 BPM.    _____________________________________________________________  EEG SUMMARY/CLASSIFICATION    Abnormal EEG in the awake, drowsy and asleep states.  - Mild ot moderate generalized slowing.    _____________________________________________________________  EEG IMPRESSION/CLINICAL CORRELATE    Abnormal EEG study.  1. Mild to moderate nonspecific diffuse or multifocal cerebral dysfunction.   2. No epileptiform pattern or seizure seen.    **In absence of additional clinical concerns, recommend consideration for discontinuation of current EEG study with reconnection in future if warranted.    Ronan Duran MD  EEG/Epilepsy Attending  [facility]   COMPREHENSIVE EPILEPSY CENTER   REPORT OF LONG-TERM VIDEO EEG     [facility]: [address], [place] [address], Ph#: [phone]  [facility]: [address] [place] [address], Ph#: [phone]  [facility]: [address] [place] [address], Ph#: [phone]    Patient Name: TAMI DUNHAM  Age and : 52y (70)  MRN #: [id number]  Location: [facility] [address]  Referring Physician: Fito Diez    Start Time/Date:0800 on 23  End Time/Date: 12:25 PM on 23  Duration: 28: 25 Hrs  _____________________________________________________________  STUDY INFORMATION    EEG Recording Technique:  The patient underwent continuous Video-EEG monitoring, using Telemetry System hardware on the XLTek Digital System. EEG and video data were stored on a computer hard drive with important events saved in digital archive files. The material was reviewed by a physician (electroencephalographer / epileptologist) on a daily basis. Donnie and seizure detection algorithms were utilized and reviewed. An EEG Technician attended to the patient, and was available throughout daytime work hours.  The epilepsy center neurologist was available in person or on call 24-hours per day.    EEG Placement and Labeling of Electrodes:  The EEG was performed utilizing 20 channel referential EEG connections (coronal over temporal over parasagittal montage) using all standard 10-20 electrode placements with EKG, with additional electrodes placed in the inferior temporal region using the modified 10-10 montage electrode placements for elective admissions, or if deemed necessary. Recording was at a sampling rate of 256 samples per second per channel. Time synchronized digital video recording was done simultaneously with EEG recording. A low light infrared camera was used for low light recording.     _____________________________________________________________  HISTORY    Patient is a 52y old  Male who presents with a chief complaint of anemia (2023 09:06)    _____________________________________________________________  STUDY INTERPRETATION    Findings: The background was continuous and reactive. During wakefulness, the posterior dominant rhythm consisted of symmetric, well-modulated 6.5-7 Hz activity, with amplitude to 30 uV, that attenuated to eye opening.      Background Slowing:  Excess diffuse polymorphic delta slowing.    Focal Slowing:   None were present.    Sleep Background:  Drowsiness was characterized by fragmentation, attenuation, and slowing of the background activity.    Sleep was characterized by the presence of symmetric, rudimentary sleep spindles and K-complexes.    Other Non-Epileptiform Findings:  None were present.    Interictal Epileptiform Activity:   None were present.    Events:  No event or seizure recorded.    Activation Procedures:   Hyperventilation was not performed.    Photic stimulation was performed and did not elicit any abnormality.     Artifacts:  Intermittent myogenic and movement artifacts were noted.    ECG:  The heart rate on single channel ECG was predominantly between 70-80 BPM.    _____________________________________________________________  EEG SUMMARY/CLASSIFICATION    Abnormal EEG in the awake, drowsy and asleep states.  - Mild ot moderate generalized slowing.    _____________________________________________________________  EEG IMPRESSION/CLINICAL CORRELATE    Abnormal EEG study.  1. Mild to moderate nonspecific diffuse or multifocal cerebral dysfunction.   2. No epileptiform pattern or seizure seen.    **In absence of additional clinical concerns, recommend consideration for discontinuation of current EEG study with reconnection in future if warranted.    Aden Dominique MD  Epilepsy Fellow , [facility]  Department of Neurology, [facility]    Ronan Duran MD  EEG/Epilepsy Attending  [facility]   COMPREHENSIVE EPILEPSY CENTER   REPORT OF LONG-TERM VIDEO EEG     [facility]: [address], [place] [address], Ph#: [phone]  [facility]: [address] [place] [address], Ph#: [phone]  [facility]: [address] [place] [address], Ph#: [phone]    Patient Name: TAMI DUNHAM  Age and : 52y (70)  MRN #: [id number]  Location: [facility] [address]  Referring Physician: Fito Diez    Start Time/Date:0800 on 23  End Time/Date: 12:25 PM on 23  Duration: 28:25 Hrs  _____________________________________________________________  STUDY INFORMATION    EEG Recording Technique:  The patient underwent continuous Video-EEG monitoring, using Telemetry System hardware on the XLTek Digital System. EEG and video data were stored on a computer hard drive with important events saved in digital archive files. The material was reviewed by a physician (electroencephalographer / epileptologist) on a daily basis. Donnie and seizure detection algorithms were utilized and reviewed. An EEG Technician attended to the patient, and was available throughout daytime work hours.  The epilepsy center neurologist was available in person or on call 24-hours per day.    EEG Placement and Labeling of Electrodes:  The EEG was performed utilizing 20 channel referential EEG connections (coronal over temporal over parasagittal montage) using all standard 10-20 electrode placements with EKG, with additional electrodes placed in the inferior temporal region using the modified 10-10 montage electrode placements for elective admissions, or if deemed necessary. Recording was at a sampling rate of 256 samples per second per channel. Time synchronized digital video recording was done simultaneously with EEG recording. A low light infrared camera was used for low light recording.     _____________________________________________________________  HISTORY    Patient is a 52y old  Male who presents with a chief complaint of anemia (2023 09:06)    _____________________________________________________________  STUDY INTERPRETATION    Findings: The background was continuous and reactive. During wakefulness, the posterior dominant rhythm consisted of symmetric, well-modulated 6.5-7 Hz activity, with amplitude to 30 uV, that attenuated to eye opening.      Background Slowing:  Excess diffuse polymorphic delta slowing.    Focal Slowing:   None were present.    Sleep Background:  Drowsiness was characterized by fragmentation, attenuation, and slowing of the background activity.    Sleep was characterized by the presence of symmetric, rudimentary sleep spindles and K-complexes.    Other Non-Epileptiform Findings:  None were present.    Interictal Epileptiform Activity:   None were present.    Events:  No event or seizure recorded.    Activation Procedures:   Hyperventilation was not performed.    Photic stimulation was performed and did not elicit any abnormality.     Artifacts:  Intermittent myogenic and movement artifacts were noted.    ECG:  The heart rate on single channel ECG was predominantly between 70-80 BPM.    _____________________________________________________________  EEG SUMMARY/CLASSIFICATION    Abnormal EEG in the awake, drowsy and asleep states.  - Mild ot moderate generalized slowing.    _____________________________________________________________  EEG IMPRESSION/CLINICAL CORRELATE    Abnormal EEG study.  1. Mild to moderate nonspecific diffuse or multifocal cerebral dysfunction.   2. No epileptiform pattern or seizure seen.    **In absence of additional clinical concerns, recommend consideration for discontinuation of current EEG study with reconnection in future if warranted.    Aden Dominique MD  Epilepsy Fellow , [facility]  Department of Neurology, [facility]    Ronan Duran MD  EEG/Epilepsy Attending

## 2023-01-23 NOTE — BRIEF OPERATIVE NOTE - NSICDXBRIEFPOSTOP_GEN_ALL_CORE_FT
POST-OP DIAGNOSIS:  Compartment syndrome, nontraumatic, lower extremity 18-Jan-2023 18:57:02  Dawson Nicole  
POST-OP DIAGNOSIS:  Compartment syndrome, nontraumatic, lower extremity 18-Jan-2023 18:57:02  Dawson Nicole

## 2023-01-23 NOTE — PROGRESS NOTE ADULT - ASSESSMENT
This is a 52 year old male who presents with altered mental status.      Anemia   -- Hg has been stable, last transfusion 3 days ago.   -- Occult blood negative. Labs show some iron deficiency but elevated ferritin. No evidence of B12/folate deficiency or hemolysis.   -- GI consulted, low suspicion for GI bleed. Pt had colonoscopy last admission w/o bleeding. Holding off endoscopic intervention since vascular surgery planned for today.   -- CT a/p negative for RP bleed   -- Follow up SPEP and light chains/Quiggs to rule out plasma cell dyscrasia ordered 1/21, results pending  -- Transfuse to maintain hg >7     r/o malignancy   -- Prior admission had MRI with abnormal findings, possible malignancy. LP w/o malignant cells.  -- Colonoscopy with biopsy of polyp lesion showing tubular adenoma. Pelvic LN biopsy requested however risks outweighed benefits.   -- CEA, , AFP wnl. PSA mildly elevated at 5.24.  -- Outpatient follow-up to discuss need for PET/CT     DVT   -- US LE shows new acute LLE DVT compared to recent imaging on 12/31 despite pt on Lovenox  -- Unclear if he was compliant with anticoagulation   -- AT III, protein C/S, Lupus profile negative, beta 2, aCL ab neg. Factor V Leiden, prothrombin gene mt also negative.   -- AC on hold for surgery. resume post op   -- s/p emergent fasciotomy for compartment syndrome   -- Planned for L BKAcarolynn AKA today with vascular      AMS   -- EEG done, no seizure activity   -- mental status improving  -- MRI w/wo contrast pending    Will continue to follow.    Caro Dow PA-C  Hematology/Oncology  [facility]  [phone] (office)  [phone] (alt office)  Evenings and weekends please call MD on call or office

## 2023-01-23 NOTE — BRIEF OPERATIVE NOTE - OPERATION/FINDINGS
LLE prepped w/ betadine and integrity of muscle/fascia assessed. Fasciotomy sites: Medial 27x7cm and Lateral 25x7, inspected and decision to proceed w/ surgical debridement w/ washout. Kimlix, ABD, ACE wrap for LE dressing.

## 2023-01-23 NOTE — CHART NOTE - NSCHARTNOTEFT_GEN_A_CORE
GI chart note-  No overt gi bleeding per dw staff. HH relatively stable since last prbc transfusion given 1/20 and remains HDS. Pt planned for OR w/ vascular today. Thus will hold off on any endoscopic intervention at this time. Cont empiric PPI. Would check non urgent US when able given elevation in lfts. Please reach out to GI team if w/ any evidence of active gi bleeding or acute clinical changes warranting endoscopic evaluation.     RENÉE Brewer PA-C, RD, CDN  available on TEAMS for questions    after 5pm/weekends, please contact the on-call GI fellow at [phone]

## 2023-01-23 NOTE — BRIEF OPERATIVE NOTE - NSICDXBRIEFPROCEDURE_GEN_ALL_CORE_FT
PROCEDURES:  Decompression fasciotomy, lower extremity 18-Jan-2023 18:55:51  Dawson Nicole  
PROCEDURES:  Decompression fasciotomy, lower extremity 18-Jan-2023 18:55:51  Dawson Nicole

## 2023-01-24 LAB
ANION GAP SERPL CALC-SCNC: 15 MMOL/L — HIGH (ref 7–14)
APTT BLD: 51.5 SEC — HIGH (ref 27–36.3)
APTT BLD: 77.1 SEC — HIGH (ref 27–36.3)
BUN SERPL-MCNC: 14 MG/DL — SIGNIFICANT CHANGE UP (ref 7–23)
CALCIUM SERPL-MCNC: 9 MG/DL — SIGNIFICANT CHANGE UP (ref 8.4–10.5)
CHLORIDE SERPL-SCNC: 100 MMOL/L — SIGNIFICANT CHANGE UP (ref 98–107)
CO2 SERPL-SCNC: 23 MMOL/L — SIGNIFICANT CHANGE UP (ref 22–31)
CREAT SERPL-MCNC: 0.68 MG/DL — SIGNIFICANT CHANGE UP (ref 0.5–1.3)
EGFR: 112 ML/MIN/1.73M2 — SIGNIFICANT CHANGE UP
GLUCOSE SERPL-MCNC: 151 MG/DL — HIGH (ref 70–99)
HCT VFR BLD CALC: 28.7 % — LOW (ref 39–50)
HCT VFR BLD CALC: 29.4 % — LOW (ref 39–50)
HGB BLD-MCNC: 9 G/DL — LOW (ref 13–17)
HGB BLD-MCNC: 9.3 G/DL — LOW (ref 13–17)
MAGNESIUM SERPL-MCNC: 2.2 MG/DL — SIGNIFICANT CHANGE UP (ref 1.6–2.6)
MCHC RBC-ENTMCNC: 28.7 PG — SIGNIFICANT CHANGE UP (ref 27–34)
MCHC RBC-ENTMCNC: 28.8 PG — SIGNIFICANT CHANGE UP (ref 27–34)
MCHC RBC-ENTMCNC: 31.4 GM/DL — LOW (ref 32–36)
MCHC RBC-ENTMCNC: 31.6 GM/DL — LOW (ref 32–36)
MCV RBC AUTO: 91 FL — SIGNIFICANT CHANGE UP (ref 80–100)
MCV RBC AUTO: 91.4 FL — SIGNIFICANT CHANGE UP (ref 80–100)
NRBC # BLD: 0 /100 WBCS — SIGNIFICANT CHANGE UP (ref 0–0)
NRBC # BLD: 0 /100 WBCS — SIGNIFICANT CHANGE UP (ref 0–0)
NRBC # FLD: 0 K/UL — SIGNIFICANT CHANGE UP (ref 0–0)
NRBC # FLD: 0 K/UL — SIGNIFICANT CHANGE UP (ref 0–0)
PHOSPHATE SERPL-MCNC: 4.1 MG/DL — SIGNIFICANT CHANGE UP (ref 2.5–4.5)
PLATELET # BLD AUTO: 458 K/UL — HIGH (ref 150–400)
PLATELET # BLD AUTO: 517 K/UL — HIGH (ref 150–400)
POTASSIUM SERPL-MCNC: 4.3 MMOL/L — SIGNIFICANT CHANGE UP (ref 3.5–5.3)
POTASSIUM SERPL-SCNC: 4.3 MMOL/L — SIGNIFICANT CHANGE UP (ref 3.5–5.3)
RBC # BLD: 3.14 M/UL — LOW (ref 4.2–5.8)
RBC # BLD: 3.23 M/UL — LOW (ref 4.2–5.8)
RBC # FLD: 15 % — HIGH (ref 10.3–14.5)
RBC # FLD: 15.1 % — HIGH (ref 10.3–14.5)
SODIUM SERPL-SCNC: 138 MMOL/L — SIGNIFICANT CHANGE UP (ref 135–145)
WBC # BLD: 12.62 K/UL — HIGH (ref 3.8–10.5)
WBC # BLD: 8.94 K/UL — SIGNIFICANT CHANGE UP (ref 3.8–10.5)
WBC # FLD AUTO: 12.62 K/UL — HIGH (ref 3.8–10.5)
WBC # FLD AUTO: 8.94 K/UL — SIGNIFICANT CHANGE UP (ref 3.8–10.5)

## 2023-01-24 PROCEDURE — 70553 MRI BRAIN STEM W/O & W/DYE: CPT | Mod: 26

## 2023-01-24 PROCEDURE — 72156 MRI NECK SPINE W/O & W/DYE: CPT | Mod: 26

## 2023-01-24 RX ORDER — HEPARIN SODIUM 5000 [USP'U]/ML
6500 INJECTION INTRAVENOUS; SUBCUTANEOUS EVERY 6 HOURS
Refills: 0 | Status: DISCONTINUED | OUTPATIENT
Start: 2023-01-24 | End: 2023-01-28

## 2023-01-24 RX ORDER — HEPARIN SODIUM 5000 [USP'U]/ML
3000 INJECTION INTRAVENOUS; SUBCUTANEOUS EVERY 6 HOURS
Refills: 0 | Status: DISCONTINUED | OUTPATIENT
Start: 2023-01-24 | End: 2023-01-28

## 2023-01-24 RX ORDER — HEPARIN SODIUM 5000 [USP'U]/ML
2000 INJECTION INTRAVENOUS; SUBCUTANEOUS
Qty: 25000 | Refills: 0 | Status: DISCONTINUED | OUTPATIENT
Start: 2023-01-24 | End: 2023-01-28

## 2023-01-24 RX ADMIN — LACOSAMIDE 120 MILLIGRAM(S): 50 TABLET ORAL at 17:59

## 2023-01-24 RX ADMIN — CHLORHEXIDINE GLUCONATE 1 APPLICATION(S): 213 SOLUTION TOPICAL at 11:47

## 2023-01-24 RX ADMIN — OXYCODONE HYDROCHLORIDE 5 MILLIGRAM(S): 5 TABLET ORAL at 18:10

## 2023-01-24 RX ADMIN — LEVETIRACETAM 400 MILLIGRAM(S): 250 TABLET, FILM COATED ORAL at 05:02

## 2023-01-24 RX ADMIN — LEVETIRACETAM 400 MILLIGRAM(S): 250 TABLET, FILM COATED ORAL at 17:34

## 2023-01-24 RX ADMIN — PIPERACILLIN AND TAZOBACTAM 25 GRAM(S): 4; .5 INJECTION, POWDER, LYOPHILIZED, FOR SOLUTION INTRAVENOUS at 05:00

## 2023-01-24 RX ADMIN — HEPARIN SODIUM 2200 UNIT(S)/HR: 5000 INJECTION INTRAVENOUS; SUBCUTANEOUS at 23:40

## 2023-01-24 RX ADMIN — LACOSAMIDE 120 MILLIGRAM(S): 50 TABLET ORAL at 05:36

## 2023-01-24 RX ADMIN — LIDOCAINE 1 PATCH: 4 CREAM TOPICAL at 11:45

## 2023-01-24 RX ADMIN — HEPARIN SODIUM 2200 UNIT(S)/HR: 5000 INJECTION INTRAVENOUS; SUBCUTANEOUS at 19:20

## 2023-01-24 RX ADMIN — HEPARIN SODIUM 2000 UNIT(S)/HR: 5000 INJECTION INTRAVENOUS; SUBCUTANEOUS at 10:13

## 2023-01-24 RX ADMIN — LIDOCAINE 1 PATCH: 4 CREAM TOPICAL at 23:00

## 2023-01-24 RX ADMIN — HEPARIN SODIUM 2200 UNIT(S)/HR: 5000 INJECTION INTRAVENOUS; SUBCUTANEOUS at 16:52

## 2023-01-24 RX ADMIN — OXYCODONE HYDROCHLORIDE 5 MILLIGRAM(S): 5 TABLET ORAL at 06:36

## 2023-01-24 RX ADMIN — OXYCODONE HYDROCHLORIDE 5 MILLIGRAM(S): 5 TABLET ORAL at 12:40

## 2023-01-24 RX ADMIN — OXYCODONE HYDROCHLORIDE 5 MILLIGRAM(S): 5 TABLET ORAL at 05:36

## 2023-01-24 RX ADMIN — MUPIROCIN 1 APPLICATION(S): 20 OINTMENT TOPICAL at 05:05

## 2023-01-24 RX ADMIN — PANTOPRAZOLE SODIUM 40 MILLIGRAM(S): 20 TABLET, DELAYED RELEASE ORAL at 17:39

## 2023-01-24 RX ADMIN — PANTOPRAZOLE SODIUM 40 MILLIGRAM(S): 20 TABLET, DELAYED RELEASE ORAL at 05:04

## 2023-01-24 RX ADMIN — Medication 81 MILLIGRAM(S): at 11:46

## 2023-01-24 RX ADMIN — LIDOCAINE 1 PATCH: 4 CREAM TOPICAL at 19:00

## 2023-01-24 RX ADMIN — PIPERACILLIN AND TAZOBACTAM 25 GRAM(S): 4; .5 INJECTION, POWDER, LYOPHILIZED, FOR SOLUTION INTRAVENOUS at 13:46

## 2023-01-24 RX ADMIN — MUPIROCIN 1 APPLICATION(S): 20 OINTMENT TOPICAL at 17:38

## 2023-01-24 RX ADMIN — HEPARIN SODIUM 3000 UNIT(S): 5000 INJECTION INTRAVENOUS; SUBCUTANEOUS at 16:53

## 2023-01-24 RX ADMIN — PIPERACILLIN AND TAZOBACTAM 25 GRAM(S): 4; .5 INJECTION, POWDER, LYOPHILIZED, FOR SOLUTION INTRAVENOUS at 21:47

## 2023-01-24 RX ADMIN — OXYCODONE HYDROCHLORIDE 5 MILLIGRAM(S): 5 TABLET ORAL at 11:41

## 2023-01-24 RX ADMIN — OXYCODONE HYDROCHLORIDE 5 MILLIGRAM(S): 5 TABLET ORAL at 19:00

## 2023-01-24 NOTE — PROGRESS NOTE ADULT - SUBJECTIVE AND OBJECTIVE BOX
POST-OPERATIVE NOTE    Patient is s/p L Decompression fasciotomy, lower extremity.    Subjective:  Patient reports pain at the incisional site, controlled with medication  Denies chest pain, shortness of breath, nausea, vomiting    Vital Signs Last 24 Hrs  T(C): 36.4 (23 Jan 2023 20:00), Max: 37.1 (23 Jan 2023 05:00)  T(F): 97.6 (23 Jan 2023 20:00), Max: 98.8 (23 Jan 2023 05:00)  HR: 79 (23 Jan 2023 20:00) (73 - 88)  BP: 110/66 (23 Jan 2023 20:00) (103/72 - 131/77)  BP(mean): 81 (23 Jan 2023 19:00) (80 - 92)  RR: 18 (23 Jan 2023 20:00) (12 - 20)  SpO2: 97% (23 Jan 2023 20:00) (94% - 100%)    Parameters below as of 23 Jan 2023 20:00  Patient On (Oxygen Delivery Method): room air    I&O's Detail    22 Jan 2023 07:01  -  23 Jan 2023 07:00  --------------------------------------------------------  IN:    Oral Fluid: 500 mL  Total IN: 500 mL    OUT:    Voided (mL): 2575 mL  Total OUT: 2575 mL    Total NET: -2075 mL    Physical Exam:  General: NAD, resting comfortably in bed  Pulmonary: Nonlabored breathing, no respiratory distress  Abdominal: soft, appropriately tender, nondistended  L wound dressing, not soiled  Extremities: [facility]    LABS:                        8.5    11.33 )-----------( 370      ( 23 Jan 2023 00:55 )             26.3     01-23    134<L>  |  101  |  8   ----------------------------<  128<H>  3.7   |  27  |  0.69    Ca    8.3<L>      23 Jan 2023 00:55  Phos  3.9     01-23  Mg     2.10     01-23    TPro  5.9<L>  /  Alb  2.5<L>  /  TBili  0.7  /  DBili  0.2  /  AST  49<H>  /  ALT  97<H>  /  AlkPhos  204<H>  01-23    PT/INR - ( 23 Jan 2023 00:55 )   PT: 12.9 sec;   INR: 1.11 ratio         PTT - ( 23 Jan 2023 00:55 )  PTT:70.3 sec    Assessment:  The patient is a 52y Male who is now several hours post-op from a L Decompression fasciotomy, lower extremity.    Plan:  - Pain control as needed  - tolerating DASH diet  - heparin IV push PTT 40-57  - F/u AM labs

## 2023-01-24 NOTE — PROGRESS NOTE ADULT - SUBJECTIVE AND OBJECTIVE BOX
SURGERY  Pager: #[phone]    INTERVAL EVENTS/SUBJECTIVE: No acute events overnight. Patient s/p debridement and washout of LLE fasciotomy sites. Patient seen and examined at bedside, reports pain well controlled.     ______________________________________________  OBJECTIVE:   T(C): 36.4 (01-24-23 @ 04:55), Max: 36.9 (01-23-23 @ 14:59)  HR: 80 (01-24-23 @ 04:55) (73 - 88)  BP: 101/63 (01-24-23 @ 04:55) (101/63 - 131/77)  RR: 18 (01-24-23 @ 04:55) (12 - 20)  SpO2: 98% (01-24-23 @ 04:55) (94% - 100%)  Wt(kg): --  CAPILLARY BLOOD GLUCOSE        I&O's Detail    23 Jan 2023 07:01  -  24 Jan 2023 07:00  --------------------------------------------------------  IN:  Total IN: 0 mL    OUT:    Voided (mL): 1000 mL  Total OUT: 1000 mL    Total NET: -1000 mL          Physical Exam:  General: NAD, resting comfortably in bed  Pulmonary: Nonlabored breathing, no respiratory distress  Abdominal: soft, appropriately tender, nondistended  L wound dressing, not soiled  Extremities: WWP  ______________________________________________  LABS:  CBC Full  -  ( 24 Jan 2023 05:16 )  WBC Count : 8.94 K/uL  RBC Count : 3.23 M/uL  Hemoglobin : 9.3 g/dL  Hematocrit : 29.4 %  Platelet Count - Automated : 458 K/uL  Mean Cell Volume : 91.0 fL  Mean Cell Hemoglobin : 28.8 pg  Mean Cell Hemoglobin Concentration : 31.6 gm/dL  Auto Neutrophil # : x  Auto Lymphocyte # : x  Auto Monocyte # : x  Auto Eosinophil # : x  Auto Basophil # : x  Auto Neutrophil % : x  Auto Lymphocyte % : x  Auto Monocyte % : x  Auto Eosinophil % : x  Auto Basophil % : x    01-23    134<L>  |  101  |  8   ----------------------------<  128<H>  3.7   |  27  |  0.69    Ca    8.3<L>      23 Jan 2023 00:55  Phos  3.9     01-23  Mg     2.10     01-23    TPro  5.9<L>  /  Alb  2.5<L>  /  TBili  0.7  /  DBili  0.2  /  AST  49<H>  /  ALT  97<H>  /  AlkPhos  204<H>  01-23    _____________________________________________  RADIOLOGY:

## 2023-01-24 NOTE — PROGRESS NOTE ADULT - SUBJECTIVE AND OBJECTIVE BOX
Name of Patient : TAMI DUNHAM  MRN: [id number]  Date of visit: 01-24-23       Subjective: Patient seen and examined. No new events except as noted.   S/P OR< no need for BKA      REVIEW OF SYSTEMS:    CONSTITUTIONAL: No weakness, fevers or chills  EYES/ENT: No visual changes;  No vertigo or throat pain   NECK: No pain or stiffness  RESPIRATORY: No cough, wheezing, hemoptysis; No shortness of breath  CARDIOVASCULAR: No chest pain or palpitations  GASTROINTESTINAL: No abdominal or epigastric pain. No nausea, vomiting, or hematemesis; No diarrhea or constipation. No melena or hematochezia.  GENITOURINARY: No dysuria, frequency or hematuria  NEUROLOGICAL: No numbness or weakness  SKIN: No itching, burning, rashes, or lesions   All other review of systems is negative unless indicated above.    MEDICATIONS:  MEDICATIONS  (STANDING):  aspirin enteric coated 81 milliGRAM(s) Oral daily  chlorhexidine 2% Cloths 1 Application(s) Topical daily  heparin  Infusion. 2000 Unit(s)/Hr (20 mL/Hr) IV Continuous <Continuous>  lacosamide IVPB 100 milliGRAM(s) IV Intermittent every 12 hours  levETIRAcetam  IVPB 1500 milliGRAM(s) IV Intermittent every 12 hours  lidocaine   4% Patch 1 Patch Transdermal daily  mupirocin 2% Ointment 1 Application(s) Topical two times a day  pantoprazole  Injectable 40 milliGRAM(s) IV Push two times a day  piperacillin/tazobactam IVPB.. 3.375 Gram(s) IV Intermittent every 8 hours      PHYSICAL EXAM:  T(C): 36.4 (01-24-23 @ 18:00), Max: 37.1 (01-24-23 @ 08:00)  HR: 74 (01-24-23 @ 18:00) (74 - 90)  BP: 115/74 (01-24-23 @ 18:00) (101/63 - 119/73)  RR: 17 (01-24-23 @ 18:00) (17 - 18)  SpO2: 100% (01-24-23 @ 18:00) (98% - 100%)  Wt(kg): --  I&O's Summary    23 Jan 2023 07:01  -  24 Jan 2023 07:00  --------------------------------------------------------  IN: 0 mL / OUT: 1000 mL / NET: -1000 mL    24 Jan 2023 07:01  -  24 Jan 2023 22:30  --------------------------------------------------------  IN: 720 mL / OUT: 750 mL / NET: -30 mL          Appearance: Normal	  HEENT:  PERRLA   Lymphatic: No lymphadenopathy   Cardiovascular: Normal S1 S2, no JVD  Respiratory: normal effort , clear  Gastrointestinal:  Soft, Non-tender  Skin: No rashes,  warm to touch  Psychiatry:  Mood & affect appropriate  Musculuskeletal: LE dressing       All labs, Imaging and EKGs personally reviewed     01-23-23 @ 07:01 - 01-24-23 @ 07:00  --------------------------------------------------------  IN: 0 mL / OUT: 1000 mL / NET: -1000 mL    01-24-23 @ 07:01 - 01-24-23 @ 22:30  --------------------------------------------------------  IN: 720 mL / OUT: 750 mL / NET: -30 mL                          9.0    12.62 )-----------( 517      ( 24 Jan 2023 15:50 )             28.7               01-24    138  |  100  |  14  ----------------------------<  151<H>  4.3   |  23  |  0.68    Ca    9.0      24 Jan 2023 07:00  Phos  4.1     01-24  Mg     2.20     01-24    TPro  5.9<L>  /  Alb  2.5<L>  /  TBili  0.7  /  DBili  0.2  /  AST  49<H>  /  ALT  97<H>  /  AlkPhos  204<H>  01-23    PT/INR - ( 23 Jan 2023 00:55 )   PT: 12.9 sec;   INR: 1.11 ratio         PTT - ( 24 Jan 2023 15:50 )  PTT:51.5 sec

## 2023-01-24 NOTE — CONSULT NOTE ADULT - ASSESSMENT
s/p left lower ext fasciotomies  questionable viability  of musculature  will discuss with vascular surgery team   cont wound dressings for now

## 2023-01-24 NOTE — CONSULT NOTE ADULT - SUBJECTIVE AND OBJECTIVE BOX
Plastic Surgery Consult Note  ([phone])    HPI:  Patient is a 52-year-old male with a past medical history of CVA/TIA, ASD/PFO?, hyperlipidemia presents emerged department today with altered mental status from his nursing facility.  Patient just had a stay in this hospital when he was found unresponsive at home.  An extensive work-up with no significant etiology found.  There were thoughts that it was related to seizure activity however neurology did not find any seizure activity on their work-up.  Patient went to a nursing facility and rehab.  Was doing well until yesterday his parents noted him to be somewhat confused and having word finding difficulties.  Today his speech was worse when noticed by the wife.  He is also noted to have odd behavior where he was pulling at sheets and the strings of the masks.  He did not have this behavior before.  Only residual deficit from his previous strokes with some random word finding abilities however his speech today is reported to be significantly worse with worse word finding.  The patient denies any pain at this time.  He does acknowledge that he is having trouble speaking.  There is also report from the family and patient is complaining of left calf pain.  This pain is much more severe than it has been in the past.     Pt was evaluated by vascular surgery and underwent left lower extremity fasciotomies due to compartment syndrome on 1/18.  Pt had recent washout of wounds two days ago.  Plastic surgery consulted for possible closure/skin graft.  Pt states he has no sensation in foot and from mid lower leg down.      PAST MEDICAL & SURGICAL HISTORY:  History of TIAs      CVA (cerebrovascular accident)      HLD (hyperlipidemia)      Vertigo      Unresponsiveness      No significant past surgical history          Allergies    No Known Allergies    Intolerances        Home Medications:  aspirin 81 mg oral delayed release tablet: 1 tab(s) orally once a day (02 Dec 2022 23:10)  atorvastatin 40 mg oral tablet: 1 tab(s) orally once a day (02 Dec 2022 23:46)  bisacodyl 10 mg rectal suppository: 1 suppository(ies) rectal once a day (11 Jan 2023 12:52)  enoxaparin: 100 milligram(s) subcutaneous 2 times a day (11 Jan 2023 12:52)  Keppra: 1500 milligram(s) orally 2 times a day (11 Jan 2023 12:52)  lidocaine 4% topical film: Apply topically to affected area once a day (11 Jan 2023 12:52)  Multiple Vitamins oral tablet: 1 tab(s) orally once a day (11 Jan 2023 12:52)  oxyCODONE 5 mg oral tablet: 1 tab(s) orally every 6 hours, As needed, moderate and severe pain (11 Jan 2023 12:52)  traMADol 50 mg oral tablet: 0.5 tab(s) orally every 6 hours, As needed, Moderate Pain (4 - 6) (11 Jan 2023 12:52)  Vimpat 100 mg oral tablet: 1 tab(s) orally 2 times a day (11 Jan 2023 12:52)      MEDICATIONS  (STANDING):  aspirin enteric coated 81 milliGRAM(s) Oral daily  chlorhexidine 2% Cloths 1 Application(s) Topical daily  heparin  Infusion. 2000 Unit(s)/Hr (20 mL/Hr) IV Continuous <Continuous>  lacosamide IVPB 100 milliGRAM(s) IV Intermittent every 12 hours  levETIRAcetam  IVPB 1500 milliGRAM(s) IV Intermittent every 12 hours  lidocaine   4% Patch 1 Patch Transdermal daily  mupirocin 2% Ointment 1 Application(s) Topical two times a day  pantoprazole  Injectable 40 milliGRAM(s) IV Push two times a day  piperacillin/tazobactam IVPB.. 3.375 Gram(s) IV Intermittent every 8 hours      SOCIAL HISTORY:    FAMILY HISTORY:      ___________________________________________  REVIEW OF SYSTEMS:    Constitutional: No fevers, chills, no recent weight loss  ENMT: No changes in hearing, no changes in vision, no sore throat, no cough  Respiratory: No shortness of breath  Cardiovascular: No chest pain, palpitations  Gastrointestinal: No abdominal pain, no diarrhea/constipation  Genitourinary: No dysuria, frequency, or urgency    Extremities: No joint swelling, no limited range of movement  Neurological: No paresthesia  Skin: No rashes    ___________________________________________  OBJECTIVE:  Vital Signs Last 24 Hrs  T(C): 36.5 (24 Jan 2023 12:00), Max: 37.1 (24 Jan 2023 08:00)  T(F): 97.7 (24 Jan 2023 12:00), Max: 98.8 (24 Jan 2023 08:00)  HR: 86 (24 Jan 2023 12:00) (73 - 90)  BP: 113/70 (24 Jan 2023 12:00) (101/63 - 122/77)  BP(mean): 81 (23 Jan 2023 19:00) (80 - 92)  RR: 17 (24 Jan 2023 12:00) (12 - 20)  SpO2: 100% (24 Jan 2023 12:00) (94% - 100%)    Parameters below as of 24 Jan 2023 12:00  Patient On (Oxygen Delivery Method): room air    CAPILLARY BLOOD GLUCOSE        I&O's Detail    23 Jan 2023 07:01  -  24 Jan 2023 07:00  --------------------------------------------------------  IN:  Total IN: 0 mL    OUT:    Voided (mL): 1000 mL  Total OUT: 1000 mL    Total NET: -1000 mL      24 Jan 2023 07:01  -  24 Jan 2023 14:24  --------------------------------------------------------  IN:    Oral Fluid: 720 mL  Total IN: 720 mL    OUT:    Voided (mL): 750 mL  Total OUT: 750 mL    Total NET: -30 mL          PHYSICAL EXAM:    General: Alert, NAD  Respiratory: non-labored breathing  Extremities:  left leg wrapped in ace bandage, no sensation over dorsum of foot or 1st web space, no motion in toes  MSK:   ____________________________________________  LABS:  CBC Full  -  ( 24 Jan 2023 05:16 )  WBC Count : 8.94 K/uL  RBC Count : 3.23 M/uL  Hemoglobin : 9.3 g/dL  Hematocrit : 29.4 %  Platelet Count - Automated : 458 K/uL  Mean Cell Volume : 91.0 fL  Mean Cell Hemoglobin : 28.8 pg  Mean Cell Hemoglobin Concentration : 31.6 gm/dL  Auto Neutrophil # : x  Auto Lymphocyte # : x  Auto Monocyte # : x  Auto Eosinophil # : x  Auto Basophil # : x  Auto Neutrophil % : x  Auto Lymphocyte % : x  Auto Monocyte % : x  Auto Eosinophil % : x  Auto Basophil % : x    01-24    138  |  100  |  14  ----------------------------<  151<H>  4.3   |  23  |  0.68    Ca    9.0      24 Jan 2023 07:00  Phos  4.1     01-24  Mg     2.20     01-24    TPro  5.9<L>  /  Alb  2.5<L>  /  TBili  0.7  /  DBili  0.2  /  AST  49<H>  /  ALT  97<H>  /  AlkPhos  204<H>  01-23    LIVER FUNCTIONS - ( 23 Jan 2023 00:55 )  Alb: 2.5 g/dL / Pro: 5.9 g/dL / ALK PHOS: 204 U/L / ALT: 97 U/L / AST: 49 U/L / GGT: x           PT/INR - ( 23 Jan 2023 00:55 )   PT: 12.9 sec;   INR: 1.11 ratio         PTT - ( 23 Jan 2023 00:55 )  PTT:70.3 sec        ____________________________________________  MICRO:    ____________________________________________  RADIOLOGY:

## 2023-01-24 NOTE — PROGRESS NOTE ADULT - ASSESSMENT
This is a 52 year old male who presents with altered mental status.      Anemia   -- Hg has been stable, last transfusion 3 days ago.   -- Occult blood negative. Labs show some iron deficiency but elevated ferritin. No evidence of B12/folate deficiency or hemolysis.   -- GI consulted, low suspicion for GI bleed. Pt had colonoscopy last admission w/o bleeding. Holding off endoscopic intervention  -- CT a/p negative for RP bleed   -- Follow up SPEP and light chains/Quiggs to rule out plasma cell dyscrasia ordered 1/21, results pending  -- Transfuse to maintain hg >7     r/o malignancy   -- Prior admission had MRI with abnormal findings, possible malignancy. LP w/o malignant cells.  -- Colonoscopy with biopsy of polyp lesion showing tubular adenoma. Pelvic LN biopsy requested however risks outweighed benefits.   -- CEA, , AFP wnl. PSA mildly elevated at 5.24.  -- Outpatient follow-up to discuss need for PET/CT     DVT   -- US LE shows new acute LLE DVT compared to recent imaging on 12/31 despite pt on Lovenox  -- Unclear if he was compliant with anticoagulation   -- AT III, protein C/S, Lupus profile negative, beta 2, aCL ab neg. Factor V Leiden, prothrombin gene mt also negative.   -- Cont AC   -- s/p emergent fasciotomy for compartment syndrome 01/18, s/p debridement with washout 01/23  -- vascular following      AMS   -- EEG done, no seizure activity   -- mental status improving  -- f/u MRI w/wo contrast    Will continue to follow.    Caro Dow PA-C  Hematology/Oncology  [facility]  [phone] (office)  [phone] (alt office)  Evenings and weekends please call MD on call or office

## 2023-01-24 NOTE — PROGRESS NOTE ADULT - ASSESSMENT
52-year-old  M  TIAs hx TIAS and strokes initially thought to be 2/2 testosterone with recent admission for seizures and AMS here with confusion. PE with psychomotor slowing, aphasia frontal reflexes bilaterally distal LE weakness here iwth AMS, found to have a DVT iun the LLE MRi on 12/29/22 showed chronic right frontalm centrum semiovale strokes and bilateral lentiform and pontine enhancemeent  -s/p fasciotomy for left leg compartment syndrome 1/18  s/p wash out on 1/23   vEEG neg     Impression: AMS with bilateral pontine and lentiform enhancement ? Clippers vs other; LLE 2/2 compartment syndrome     When stabilized further w/u for above  -LP with extensive CSF studies unrevealing  -Body imaging unrevealing, pelvic LNS not amenable to BX as per IR  -Repeat MRI brain w/w/o con, would obtain MRI C-spine w/con  -After MRI will discuss role of pulse steroids followed by high dose steroid with potential comparison MRI  -Would reach out to NSX if amenable to biopsy  -On Keppra 1500 mg BID and Vimpat 100 mg BID  outpatient hypercoag workup neg     Braxton Forde MD  Vascular Neurology  Office: [phone]

## 2023-01-24 NOTE — PROGRESS NOTE ADULT - SUBJECTIVE AND OBJECTIVE BOX
Patient is a 52y old  Male who presents with a chief complaint of anemia (20 Jan 2023 09:06)    Pt seen this afternoon. He feels well, states he was surprised to find his LLE still present after OR.  s/p debridement and washout LLE     MEDICATIONS  (STANDING):  aspirin enteric coated 81 milliGRAM(s) Oral daily  chlorhexidine 2% Cloths 1 Application(s) Topical daily  heparin  Infusion. 2000 Unit(s)/Hr (20 mL/Hr) IV Continuous <Continuous>  lacosamide IVPB 100 milliGRAM(s) IV Intermittent every 12 hours  levETIRAcetam  IVPB 1500 milliGRAM(s) IV Intermittent every 12 hours  lidocaine   4% Patch 1 Patch Transdermal daily  mupirocin 2% Ointment 1 Application(s) Topical two times a day  pantoprazole  Injectable 40 milliGRAM(s) IV Push two times a day  piperacillin/tazobactam IVPB.. 3.375 Gram(s) IV Intermittent every 8 hours    MEDICATIONS  (PRN):  heparin   Injectable 6500 Unit(s) IV Push every 6 hours PRN For aPTT less than 40  heparin   Injectable 3000 Unit(s) IV Push every 6 hours PRN For aPTT between 40 - 57  heparin   Injectable 6500 Unit(s) IV Push every 6 hours PRN For aPTT less than 40  heparin   Injectable 3000 Unit(s) IV Push every 6 hours PRN For aPTT between 40 - 57  oxyCODONE    IR 5 milliGRAM(s) Oral every 6 hours PRN Severe Pain (7 - 10)        Vital Signs Last 24 Hrs  T(C): 36.5 (24 Jan 2023 12:00), Max: 37.1 (24 Jan 2023 08:00)  T(F): 97.7 (24 Jan 2023 12:00), Max: 98.8 (24 Jan 2023 08:00)  HR: 86 (24 Jan 2023 12:00) (73 - 90)  BP: 113/70 (24 Jan 2023 12:00) (101/63 - 131/77)  BP(mean): 81 (23 Jan 2023 19:00) (80 - 92)  RR: 17 (24 Jan 2023 12:00) (12 - 20)  SpO2: 100% (24 Jan 2023 12:00) (94% - 100%)    Parameters below as of 24 Jan 2023 12:00  Patient On (Oxygen Delivery Method): room air        PE  NAD  Awake, alert  Anicteric, MMM  RRR  CTAB  Abd soft, NT, ND  LLE in ace wrap, significant edema                           9.3    8.94  )-----------( 458      ( 24 Jan 2023 05:16 )             29.4       01-24    138  |  100  |  14  ----------------------------<  151<H>  4.3   |  23  |  0.68    Ca    9.0      24 Jan 2023 07:00  Phos  4.1     01-24  Mg     2.20     01-24    TPro  5.9<L>  /  Alb  2.5<L>  /  TBili  0.7  /  DBili  0.2  /  AST  49<H>  /  ALT  97<H>  /  AlkPhos  204<H>  01-23

## 2023-01-24 NOTE — PROGRESS NOTE ADULT - SUBJECTIVE AND OBJECTIVE BOX
Neurology Progress Note    S: Patient seen and examined. No new events overnight. patient denied CP, SOB, HA or pain.     Medication:  aspirin enteric coated 81 milliGRAM(s) Oral daily  chlorhexidine 2% Cloths 1 Application(s) Topical daily  heparin   Injectable 6500 Unit(s) IV Push every 6 hours PRN  heparin   Injectable 3000 Unit(s) IV Push every 6 hours PRN  heparin   Injectable 6500 Unit(s) IV Push every 6 hours PRN  heparin   Injectable 3000 Unit(s) IV Push every 6 hours PRN  heparin  Infusion. 2000 Unit(s)/Hr IV Continuous <Continuous>  lacosamide IVPB 100 milliGRAM(s) IV Intermittent every 12 hours  levETIRAcetam  IVPB 1500 milliGRAM(s) IV Intermittent every 12 hours  lidocaine   4% Patch 1 Patch Transdermal daily  mupirocin 2% Ointment 1 Application(s) Topical two times a day  oxyCODONE    IR 5 milliGRAM(s) Oral every 6 hours PRN  pantoprazole  Injectable 40 milliGRAM(s) IV Push two times a day  piperacillin/tazobactam IVPB.. 3.375 Gram(s) IV Intermittent every 8 hours      Vitals:  Vital Signs Last 24 Hrs  T(C): 37.1 (24 Jan 2023 08:00), Max: 37.1 (24 Jan 2023 08:00)  T(F): 98.8 (24 Jan 2023 08:00), Max: 98.8 (24 Jan 2023 08:00)  HR: 90 (24 Jan 2023 08:00) (73 - 90)  BP: 119/73 (24 Jan 2023 08:00) (101/63 - 131/77)  BP(mean): 81 (23 Jan 2023 19:00) (80 - 92)  RR: 17 (24 Jan 2023 08:00) (12 - 20)  SpO2: 98% (24 Jan 2023 08:00) (94% - 100%)    Parameters below as of 24 Jan 2023 08:00  Patient On (Oxygen Delivery Method): room air        General Exam:   General Appearance: Appropriately dressed and in no acute distress       Head: Normocephalic, atraumatic and no dysmorphic features  Ear, Nose, and Throat: Moist mucous membranes  CVS: S1S2+  Resp: No SOB, no wheeze or rhonchi  Abd: soft NTND  Extremities: No edema, no cyanosis  Skin: No bruises, no rashes     Neurological Exam:  Mental Status: Awake, alert and oriented x 3.  Able to follow simple and complex verbal commands. Able to name and repeat. fluent speech. No obvious aphasia or dysarthria noted.   Cranial Nerves: PERRL, EOMI, VFFC, sensation V1-V3 intact,  no obvious facial asymmetry , equal elevation of palate, scm/trap 5/5, tongue is midline on protrusion. no obvious papilledema on fundoscopic exam. Hearing is grossly intact.   Motor: Normal bulk, tone and strength throughout upperse. LLE in brace s/p OR. some distal limb movement. RLE 4+/5   Sensation: Intact to light touch and pinprick throughout. no right/left confusion. no extinction to tactile on DSS.   Reflexes: 1+ throughout at biceps, brachioradialis, triceps, patellars and ankles bilaterally and equal. No clonus. R toe and L toe were both downgoing.  Coordination: No dysmetria on FNF    Gait: deferred     I personally reviewed the below data/images/labs:      CBC Full  -  ( 24 Jan 2023 05:16 )  WBC Count : 8.94 K/uL  RBC Count : 3.23 M/uL  Hemoglobin : 9.3 g/dL  Hematocrit : 29.4 %  Platelet Count - Automated : 458 K/uL  Mean Cell Volume : 91.0 fL  Mean Cell Hemoglobin : 28.8 pg  Mean Cell Hemoglobin Concentration : 31.6 gm/dL  Auto Neutrophil # : x  Auto Lymphocyte # : x  Auto Monocyte # : x  Auto Eosinophil # : x  Auto Basophil # : x  Auto Neutrophil % : x  Auto Lymphocyte % : x  Auto Monocyte % : x  Auto Eosinophil % : x  Auto Basophil % : x    01-24    138  |  100  |  14  ----------------------------<  151<H>  4.3   |  23  |  0.68    Ca    9.0      24 Jan 2023 07:00  Phos  4.1     01-24  Mg     2.20     01-24    TPro  5.9<L>  /  Alb  2.5<L>  /  TBili  0.7  /  DBili  0.2  /  AST  49<H>  /  ALT  97<H>  /  AlkPhos  204<H>  01-23    LIVER FUNCTIONS - ( 23 Jan 2023 00:55 )  Alb: 2.5 g/dL / Pro: 5.9 g/dL / ALK PHOS: 204 U/L / ALT: 97 U/L / AST: 49 U/L / GGT: x           PT/INR - ( 23 Jan 2023 00:55 )   PT: 12.9 sec;   INR: 1.11 ratio         PTT - ( 23 Jan 2023 00:55 )  PTT:70.3 sec    MRI:   IMPRESSION:    Chronic infarcts of the right frontal subcortical and right centrum   semiovale. No new areas of infarct are identified. Unchanged scattered   foci of hemosiderin.    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   Differential diagnosis includes infection, sarcoidosis, lymphoma and   CLIPPERS (chronic lymphocytic inflammation with pontine perivascular   enhancement unresponsive to steroids).    --- End of Report ---    < end of copied text >  < from: MR Head w/wo IV Cont (12.29.22 @ 13:39) >  IMPRESSION:    Chronic infarcts of the right frontal subcortical and right centrum   semiovale. No new areas of infarct are identified. Unchanged scattered   foci of hemosiderin.    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   Differential diagnosis includes infection, sarcoidosis, lymphoma and   CLIPPERS (chronic lymphocytic inflammation with pontine perivascular   enhancement unresponsive to steroids).    --- End of Report ---    < end of copied text >

## 2023-01-24 NOTE — PROGRESS NOTE ADULT - ASSESSMENT
52M w hx of multiple CVA in last year with residual deficit of word finding difficulty, recent admission for unresponsiveness [facility] seizure activity @ OSH, who presents with AMS today from nursing facility, found to have acute onset LLE DVT L pop v, L PT v, and L gastroc v cb compartment syndrome of LLE. S/p left 4 quadrant decompression fasciotomy, RTOR for additional washout and re-evaluation of muscle viability .    Plan:  - OK for full AC from vascular surgery perspective  - Plastics consult for eventual skin graft for fasciotomy sites     C Team Surgery  #[id number]

## 2023-01-24 NOTE — PROGRESS NOTE ADULT - ASSESSMENT
Patient is a 52-year-old male with a past medical history of CVA/TIA, ASD/PFO?, hyperlipidemia presents emerged department today with altered mental status from his nursing facility.  Patient just had a stay in this hospital when he was found unresponsive at home.  An extensive work-up with no significant etiology found.  There were thoughts that it was related to seizure activity however neurology did not find any seizure activity on their work-up.  Patient went to a nursing facility and rehab.  Was doing well until yesterday his parents noted him to be somewhat confused and having word finding difficulties.  Today his speech was worse when noticed by the wife.  He is also noted to have odd behavior where he was pulling at sheets and the strings of the masks.  He did not have this behavior before.  Only residual deficit from his previous strokes with some random word finding abilities however his speech today is reported to be significantly worse with worse word finding.  The patient denies any pain at this time.  He does acknowledge that he is having trouble speaking.  There is also report from the family and patient is complaining of left calf pain.  This pain is much more severe than it has been in the past.    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, SP LP, no malignancy on cyto   tele monitoring   Plan for MRI per Neuro recs, ordered    # Lekucytosis  Pan CX   Infectious W/U   CTA negative   ID eval appreciated    Hematology eval appreciated, follow up recs     #  Compartment syndrome   S/P Fasciotomy   Vascular follow up appreciated   dressing per vascular follow up recs   ID eval appreciated, cont zosyn, total of 7 days   as per vascualr, no need for BKLA>alive muscle, wound care  plastic surg eval called for possible flap   resume AC         # Elevated LFTs  trending up  hold tyleniol and statin  trend for now  if cont to trend up, will obtain US   likely due to acute anemia       # ANemia  Occult negative  Total of 3 units PRBC  serial CBC   GI follow up appreciated

## 2023-01-25 LAB
% ALBUMIN: 34.1 % — SIGNIFICANT CHANGE UP
% ALPHA 1: 11.9 % — SIGNIFICANT CHANGE UP
% ALPHA 2: 17.2 % — SIGNIFICANT CHANGE UP
% BETA: 20.9 % — SIGNIFICANT CHANGE UP
% GAMMA: 15.8 % — SIGNIFICANT CHANGE UP
ALBUMIN SERPL ELPH-MCNC: 2.08 G/DL — LOW (ref 3.3–4.4)
ALBUMIN SERPL ELPH-MCNC: 2.6 G/DL — LOW (ref 3.3–5)
ALBUMIN/GLOB SERPL ELPH: 0.5 RATIO — SIGNIFICANT CHANGE UP
ALP SERPL-CCNC: 143 U/L — HIGH (ref 40–120)
ALPHA1 GLOB SERPL ELPH-MCNC: 0.73 G/DL — HIGH (ref 0.1–0.3)
ALPHA2 GLOB SERPL ELPH-MCNC: 1 G/DL — SIGNIFICANT CHANGE UP (ref 0.6–1)
ALT FLD-CCNC: 51 U/L — HIGH (ref 4–41)
ANION GAP SERPL CALC-SCNC: 9 MMOL/L — SIGNIFICANT CHANGE UP (ref 7–14)
APTT BLD: 83.8 SEC — HIGH (ref 27–36.3)
AST SERPL-CCNC: 22 U/L — SIGNIFICANT CHANGE UP (ref 4–40)
B-GLOBULIN SERPL ELPH-MCNC: 1.27 G/DL — HIGH (ref 0.6–1.1)
BILIRUB DIRECT SERPL-MCNC: <0.2 MG/DL — SIGNIFICANT CHANGE UP (ref 0–0.3)
BILIRUB INDIRECT FLD-MCNC: >0.3 MG/DL — SIGNIFICANT CHANGE UP (ref 0–1)
BILIRUB SERPL-MCNC: 0.5 MG/DL — SIGNIFICANT CHANGE UP (ref 0.2–1.2)
BUN SERPL-MCNC: 15 MG/DL — SIGNIFICANT CHANGE UP (ref 7–23)
CALCIUM SERPL-MCNC: 8.3 MG/DL — LOW (ref 8.4–10.5)
CHLORIDE SERPL-SCNC: 102 MMOL/L — SIGNIFICANT CHANGE UP (ref 98–107)
CO2 SERPL-SCNC: 27 MMOL/L — SIGNIFICANT CHANGE UP (ref 22–31)
CREAT SERPL-MCNC: 0.84 MG/DL — SIGNIFICANT CHANGE UP (ref 0.5–1.3)
EGFR: 105 ML/MIN/1.73M2 — SIGNIFICANT CHANGE UP
GAMMA GLOBULIN: 0.96 G/DL — SIGNIFICANT CHANGE UP (ref 0.7–1.7)
GLUCOSE SERPL-MCNC: 111 MG/DL — HIGH (ref 70–99)
HCT VFR BLD CALC: 26.2 % — LOW (ref 39–50)
HGB BLD-MCNC: 8.4 G/DL — LOW (ref 13–17)
MAGNESIUM SERPL-MCNC: 2.2 MG/DL — SIGNIFICANT CHANGE UP (ref 1.6–2.6)
MCHC RBC-ENTMCNC: 29.4 PG — SIGNIFICANT CHANGE UP (ref 27–34)
MCHC RBC-ENTMCNC: 32.1 GM/DL — SIGNIFICANT CHANGE UP (ref 32–36)
MCV RBC AUTO: 91.6 FL — SIGNIFICANT CHANGE UP (ref 80–100)
NRBC # BLD: 0 /100 WBCS — SIGNIFICANT CHANGE UP (ref 0–0)
NRBC # FLD: 0 K/UL — SIGNIFICANT CHANGE UP (ref 0–0)
PHOSPHATE SERPL-MCNC: 3.9 MG/DL — SIGNIFICANT CHANGE UP (ref 2.5–4.5)
PLATELET # BLD AUTO: 510 K/UL — HIGH (ref 150–400)
POTASSIUM SERPL-MCNC: 3.7 MMOL/L — SIGNIFICANT CHANGE UP (ref 3.5–5.3)
POTASSIUM SERPL-SCNC: 3.7 MMOL/L — SIGNIFICANT CHANGE UP (ref 3.5–5.3)
PROT PATTERN SERPL ELPH-IMP: SIGNIFICANT CHANGE UP
PROT SERPL-MCNC: 6.1 G/DL — SIGNIFICANT CHANGE UP
PROT SERPL-MCNC: 6.1 G/DL — SIGNIFICANT CHANGE UP (ref 6–8.3)
RBC # BLD: 2.86 M/UL — LOW (ref 4.2–5.8)
RBC # FLD: 15.2 % — HIGH (ref 10.3–14.5)
SODIUM SERPL-SCNC: 138 MMOL/L — SIGNIFICANT CHANGE UP (ref 135–145)
WBC # BLD: 11.25 K/UL — HIGH (ref 3.8–10.5)
WBC # FLD AUTO: 11.25 K/UL — HIGH (ref 3.8–10.5)

## 2023-01-25 RX ORDER — HYDROMORPHONE HYDROCHLORIDE 2 MG/ML
1 INJECTION INTRAMUSCULAR; INTRAVENOUS; SUBCUTANEOUS ONCE
Refills: 0 | Status: DISCONTINUED | OUTPATIENT
Start: 2023-01-25 | End: 2023-01-25

## 2023-01-25 RX ORDER — POLYETHYLENE GLYCOL 3350 17 G/17G
17 POWDER, FOR SOLUTION ORAL DAILY
Refills: 0 | Status: DISCONTINUED | OUTPATIENT
Start: 2023-01-25 | End: 2023-01-30

## 2023-01-25 RX ORDER — SENNA PLUS 8.6 MG/1
2 TABLET ORAL AT BEDTIME
Refills: 0 | Status: DISCONTINUED | OUTPATIENT
Start: 2023-01-25 | End: 2023-02-06

## 2023-01-25 RX ADMIN — LEVETIRACETAM 400 MILLIGRAM(S): 250 TABLET, FILM COATED ORAL at 17:07

## 2023-01-25 RX ADMIN — PANTOPRAZOLE SODIUM 40 MILLIGRAM(S): 20 TABLET, DELAYED RELEASE ORAL at 17:08

## 2023-01-25 RX ADMIN — PANTOPRAZOLE SODIUM 40 MILLIGRAM(S): 20 TABLET, DELAYED RELEASE ORAL at 05:35

## 2023-01-25 RX ADMIN — LIDOCAINE 1 PATCH: 4 CREAM TOPICAL at 12:46

## 2023-01-25 RX ADMIN — OXYCODONE HYDROCHLORIDE 5 MILLIGRAM(S): 5 TABLET ORAL at 00:55

## 2023-01-25 RX ADMIN — PIPERACILLIN AND TAZOBACTAM 25 GRAM(S): 4; .5 INJECTION, POWDER, LYOPHILIZED, FOR SOLUTION INTRAVENOUS at 13:38

## 2023-01-25 RX ADMIN — Medication 1 TABLET(S): at 17:13

## 2023-01-25 RX ADMIN — OXYCODONE HYDROCHLORIDE 5 MILLIGRAM(S): 5 TABLET ORAL at 18:10

## 2023-01-25 RX ADMIN — MUPIROCIN 1 APPLICATION(S): 20 OINTMENT TOPICAL at 17:11

## 2023-01-25 RX ADMIN — PIPERACILLIN AND TAZOBACTAM 25 GRAM(S): 4; .5 INJECTION, POWDER, LYOPHILIZED, FOR SOLUTION INTRAVENOUS at 21:35

## 2023-01-25 RX ADMIN — OXYCODONE HYDROCHLORIDE 5 MILLIGRAM(S): 5 TABLET ORAL at 17:07

## 2023-01-25 RX ADMIN — HEPARIN SODIUM 2200 UNIT(S)/HR: 5000 INJECTION INTRAVENOUS; SUBCUTANEOUS at 07:25

## 2023-01-25 RX ADMIN — POLYETHYLENE GLYCOL 3350 17 GRAM(S): 17 POWDER, FOR SOLUTION ORAL at 15:20

## 2023-01-25 RX ADMIN — OXYCODONE HYDROCHLORIDE 5 MILLIGRAM(S): 5 TABLET ORAL at 10:20

## 2023-01-25 RX ADMIN — HEPARIN SODIUM 2200 UNIT(S)/HR: 5000 INJECTION INTRAVENOUS; SUBCUTANEOUS at 07:21

## 2023-01-25 RX ADMIN — CHLORHEXIDINE GLUCONATE 1 APPLICATION(S): 213 SOLUTION TOPICAL at 12:45

## 2023-01-25 RX ADMIN — MUPIROCIN 1 APPLICATION(S): 20 OINTMENT TOPICAL at 05:38

## 2023-01-25 RX ADMIN — LIDOCAINE 1 PATCH: 4 CREAM TOPICAL at 19:15

## 2023-01-25 RX ADMIN — LACOSAMIDE 120 MILLIGRAM(S): 50 TABLET ORAL at 17:54

## 2023-01-25 RX ADMIN — LACOSAMIDE 120 MILLIGRAM(S): 50 TABLET ORAL at 05:33

## 2023-01-25 RX ADMIN — PIPERACILLIN AND TAZOBACTAM 25 GRAM(S): 4; .5 INJECTION, POWDER, LYOPHILIZED, FOR SOLUTION INTRAVENOUS at 06:10

## 2023-01-25 RX ADMIN — HEPARIN SODIUM 2200 UNIT(S)/HR: 5000 INJECTION INTRAVENOUS; SUBCUTANEOUS at 19:25

## 2023-01-25 RX ADMIN — HEPARIN SODIUM 2200 UNIT(S)/HR: 5000 INJECTION INTRAVENOUS; SUBCUTANEOUS at 00:19

## 2023-01-25 RX ADMIN — SENNA PLUS 2 TABLET(S): 8.6 TABLET ORAL at 21:34

## 2023-01-25 RX ADMIN — Medication 81 MILLIGRAM(S): at 12:44

## 2023-01-25 RX ADMIN — HYDROMORPHONE HYDROCHLORIDE 1 MILLIGRAM(S): 2 INJECTION INTRAMUSCULAR; INTRAVENOUS; SUBCUTANEOUS at 10:11

## 2023-01-25 RX ADMIN — OXYCODONE HYDROCHLORIDE 5 MILLIGRAM(S): 5 TABLET ORAL at 00:10

## 2023-01-25 RX ADMIN — HEPARIN SODIUM 2200 UNIT(S)/HR: 5000 INJECTION INTRAVENOUS; SUBCUTANEOUS at 12:59

## 2023-01-25 RX ADMIN — HEPARIN SODIUM 2200 UNIT(S)/HR: 5000 INJECTION INTRAVENOUS; SUBCUTANEOUS at 08:13

## 2023-01-25 RX ADMIN — LEVETIRACETAM 400 MILLIGRAM(S): 250 TABLET, FILM COATED ORAL at 06:10

## 2023-01-25 RX ADMIN — OXYCODONE HYDROCHLORIDE 5 MILLIGRAM(S): 5 TABLET ORAL at 09:21

## 2023-01-25 RX ADMIN — HYDROMORPHONE HYDROCHLORIDE 1 MILLIGRAM(S): 2 INJECTION INTRAMUSCULAR; INTRAVENOUS; SUBCUTANEOUS at 10:30

## 2023-01-25 NOTE — DIETITIAN INITIAL EVALUATION ADULT - PERTINENT LABORATORY DATA
01-25 Na 138 mmol/L Glu 111 mg/dL<H> K+ 3.7 mmol/L Cr 0.84 mg/dL BUN 15 mg/dL Phos 3.9 mg/dL    A1C with Estimated Average Glucose Result: 5.6 % (11-05-22 @ 06:57)

## 2023-01-25 NOTE — DIETITIAN INITIAL EVALUATION ADULT - OTHER INFO
Per chart, 52-year-old male with a past medical history of CVA/TIA, ASD/PFO?, hyperlipidemia presents emerged department today with altered mental status from his nursing facility.  Patient just had a stay in this hospital when he was found unresponsive at home.  An extensive work-up with no significant etiology found.  There were thoughts that it was related to seizure activity however neurology did not find any seizure activity on their work-up.  Patient went to a nursing facility and rehab.  Was doing well until yesterday his parents noted him to be somewhat confused and having word finding difficulties.  Today his speech was worse when noticed by the wife.  He is also noted to have odd behavior where he was pulling at sheets and the strings of the masks.  He did not have this behavior before.  Only residual deficit from his previous strokes with some random word finding abilities however his speech today is reported to be significantly worse with worse word finding.  The patient denies any pain at this time.  He does acknowledge that he is having trouble speaking.  There is also report from the family and patient is complaining of left calf pain.  This pain is much more severe than it has been in the past.    Nutrition interview: No recent episodes of nausea, vomiting, diarrhea or constipation, last BM noted on 1/23 per Pt. Denies any chewing/swallowing difficulties. No food allergies. Stated UBW: ~175#, in line with current wt. Food preferences explored and noted. Intake is % per RN flowsheets and per pt. Feeding skills: set up help required.

## 2023-01-25 NOTE — DIETITIAN INITIAL EVALUATION ADULT - PERTINENT MEDS FT
MEDICATIONS  (STANDING):  aspirin enteric coated 81 milliGRAM(s) Oral daily  chlorhexidine 2% Cloths 1 Application(s) Topical daily  heparin  Infusion. 2000 Unit(s)/Hr (20 mL/Hr) IV Continuous <Continuous>  lacosamide IVPB 100 milliGRAM(s) IV Intermittent every 12 hours  levETIRAcetam  IVPB 1500 milliGRAM(s) IV Intermittent every 12 hours  lidocaine   4% Patch 1 Patch Transdermal daily  mupirocin 2% Ointment 1 Application(s) Topical two times a day  pantoprazole  Injectable 40 milliGRAM(s) IV Push two times a day  piperacillin/tazobactam IVPB.. 3.375 Gram(s) IV Intermittent every 8 hours    MEDICATIONS  (PRN):  heparin   Injectable 6500 Unit(s) IV Push every 6 hours PRN For aPTT less than 40  heparin   Injectable 3000 Unit(s) IV Push every 6 hours PRN For aPTT between 40 - 57  heparin   Injectable 6500 Unit(s) IV Push every 6 hours PRN For aPTT less than 40  heparin   Injectable 3000 Unit(s) IV Push every 6 hours PRN For aPTT between 40 - 57  oxyCODONE    IR 5 milliGRAM(s) Oral every 6 hours PRN Severe Pain (7 - 10)

## 2023-01-25 NOTE — PROGRESS NOTE ADULT - ASSESSMENT
Patient is a 52-year-old male with a past medical history of CVA/TIA, ASD/PFO?, hyperlipidemia s/p  left lower extremity fasciotomies due to compartment syndrome on 1/18.  Pt had recent washout of wounds two days ago.  Plastic surgery consulted for possible closure/skin graft.      PLAN:  - s/p left lower ext fasciotomies  - cont wound dressings for now  - will coordinate next dressing change w/ primary team and obtain pictures of wounds    Plastic Surgery  [id number]

## 2023-01-25 NOTE — PROGRESS NOTE ADULT - SUBJECTIVE AND OBJECTIVE BOX
Plastic Surgery Progress Note (pg [facility]: [id number], [facility]: [phone])    SUBJECTIVE  The patient was seen and examined. No acute events overnight.    OBJECTIVE  ___________________________________________________  VITAL SIGNS / I&O's   Vital Signs Last 24 Hrs  T(C): 36.6 (25 Jan 2023 06:00), Max: 37.1 (24 Jan 2023 08:00)  T(F): 97.8 (25 Jan 2023 06:00), Max: 98.8 (24 Jan 2023 08:00)  HR: 81 (25 Jan 2023 06:00) (74 - 90)  BP: 118/75 (25 Jan 2023 06:00) (113/70 - 120/71)  BP(mean): --  RR: 18 (25 Jan 2023 06:00) (17 - 18)  SpO2: 100% (25 Jan 2023 06:00) (98% - 100%)    Parameters below as of 25 Jan 2023 06:00  Patient On (Oxygen Delivery Method): room air          24 Jan 2023 07:01  -  25 Jan 2023 07:00  --------------------------------------------------------  IN:    Oral Fluid: 720 mL  Total IN: 720 mL    OUT:    Voided (mL): 750 mL  Total OUT: 750 mL    Total NET: -30 mL        ___________________________________________________  PHYSICAL EXAM  General: Alert, NAD  Respiratory: non-labored breathing  Extremities:  left leg wrapped in ace bandage, no sensation over dorsum of foot or 1st web space, no motion in toes; LLE w/ significant swelling       _______________________  LABS                        9.0    12.62 )-----------( 517      ( 24 Jan 2023 15:50 )             28.7     24 Jan 2023 07:00    138    |  100    |  14     ----------------------------<  151    4.3     |  23     |  0.68     Ca    9.0        24 Jan 2023 07:00  Phos  4.1       24 Jan 2023 07:00  Mg     2.20      24 Jan 2023 07:00      PTT - ( 24 Jan 2023 22:50 )  PTT:77.1 sec  CAPILLARY BLOOD GLUCOSE              ___________________________________________________  MICRO  Recent Cultures:  Specimen Source: .Blood Blood-Venous, 01-18 @ 08:05; Results   No Growth Final; Gram Stain: --; Organism: --  Specimen Source: .Blood Blood-Peripheral, 01-18 @ 07:50; Results   No Growth Final; Gram Stain: --; Organism: --    ___________________________________________________  MEDICATIONS  (STANDING):  aspirin enteric coated 81 milliGRAM(s) Oral daily  chlorhexidine 2% Cloths 1 Application(s) Topical daily  heparin  Infusion. 2000 Unit(s)/Hr (20 mL/Hr) IV Continuous <Continuous>  lacosamide IVPB 100 milliGRAM(s) IV Intermittent every 12 hours  levETIRAcetam  IVPB 1500 milliGRAM(s) IV Intermittent every 12 hours  lidocaine   4% Patch 1 Patch Transdermal daily  mupirocin 2% Ointment 1 Application(s) Topical two times a day  pantoprazole  Injectable 40 milliGRAM(s) IV Push two times a day  piperacillin/tazobactam IVPB.. 3.375 Gram(s) IV Intermittent every 8 hours    MEDICATIONS  (PRN):  heparin   Injectable 6500 Unit(s) IV Push every 6 hours PRN For aPTT less than 40  heparin   Injectable 3000 Unit(s) IV Push every 6 hours PRN For aPTT between 40 - 57  heparin   Injectable 6500 Unit(s) IV Push every 6 hours PRN For aPTT less than 40  heparin   Injectable 3000 Unit(s) IV Push every 6 hours PRN For aPTT between 40 - 57  oxyCODONE    IR 5 milliGRAM(s) Oral every 6 hours PRN Severe Pain (7 - 10)

## 2023-01-25 NOTE — PROGRESS NOTE ADULT - ASSESSMENT
52M w hx of multiple CVA in last year with residual deficit of word finding difficulty, recent admission for unresponsiveness [facility] seizure activity @ OSH, who presents with AMS today from nursing facility, found to have acute onset LLE DVT L pop v, L PT v, and L gastroc v cb compartment syndrome of LLE. S/p left 4 quadrant decompression fasciotomy, RTOR for additional washout and re-evaluation of muscle viability .    Plan:  - OK for full AC from vascular surgery perspective  - Recommend wound team eval for vac placement  - Plastics consult for interval closure of fasciotomy sites     C Team Surgery  #[id number]

## 2023-01-25 NOTE — PROGRESS NOTE ADULT - SUBJECTIVE AND OBJECTIVE BOX
Neurology Progress Note    S: Patient seen and examined. No new events overnight. patient denied CP, SOB, HA or pain.     Medication:    MEDICATIONS  (STANDING):  aspirin enteric coated 81 milliGRAM(s) Oral daily  chlorhexidine 2% Cloths 1 Application(s) Topical daily  heparin  Infusion. 2000 Unit(s)/Hr (20 mL/Hr) IV Continuous <Continuous>  lacosamide IVPB 100 milliGRAM(s) IV Intermittent every 12 hours  levETIRAcetam  IVPB 1500 milliGRAM(s) IV Intermittent every 12 hours  lidocaine   4% Patch 1 Patch Transdermal daily  multivitamin 1 Tablet(s) Oral daily  mupirocin 2% Ointment 1 Application(s) Topical two times a day  pantoprazole  Injectable 40 milliGRAM(s) IV Push two times a day  piperacillin/tazobactam IVPB.. 3.375 Gram(s) IV Intermittent every 8 hours  senna 2 Tablet(s) Oral at bedtime    MEDICATIONS  (PRN):  heparin   Injectable 6500 Unit(s) IV Push every 6 hours PRN For aPTT less than 40  heparin   Injectable 3000 Unit(s) IV Push every 6 hours PRN For aPTT between 40 - 57  heparin   Injectable 6500 Unit(s) IV Push every 6 hours PRN For aPTT less than 40  heparin   Injectable 3000 Unit(s) IV Push every 6 hours PRN For aPTT between 40 - 57  oxyCODONE    IR 5 milliGRAM(s) Oral every 6 hours PRN Severe Pain (7 - 10)  polyethylene glycol 3350 17 Gram(s) Oral daily PRN Constipation        Vitals:    Vital Signs Last 24 Hrs  T(C): 36.5 (25 Jan 2023 10:10), Max: 36.7 (25 Jan 2023 07:30)  T(F): 97.7 (25 Jan 2023 10:10), Max: 98 (25 Jan 2023 07:30)  HR: 80 (25 Jan 2023 10:10) (74 - 81)  BP: 107/69 (25 Jan 2023 10:10) (100/65 - 120/71)  BP(mean): --  RR: 20 (25 Jan 2023 10:10) (17 - 20)  SpO2: 100% (25 Jan 2023 10:10) (100% - 100%)    Parameters below as of 25 Jan 2023 10:10  Patient On (Oxygen Delivery Method): room air          General Exam:   General Appearance: Appropriately dressed and in no acute distress       Head: Normocephalic, atraumatic and no dysmorphic features  Ear, Nose, and Throat: Moist mucous membranes  CVS: S1S2+  Resp: No SOB, no wheeze or rhonchi  Abd: soft NTND  Extremities: No edema, no cyanosis  Skin: No bruises, no rashes     Neurological Exam:  Mental Status: Awake, alert and oriented x 3.  Able to follow simple and complex verbal commands. Able to name and repeat. fluent speech. No obvious aphasia or dysarthria noted.   Cranial Nerves: PERRL, EOMI, VFFC, sensation V1-V3 intact,  no obvious facial asymmetry , equal elevation of palate, scm/trap 5/5, tongue is midline on protrusion. no obvious papilledema on fundoscopic exam. Hearing is grossly intact.   Motor: Normal bulk, tone and strength throughout upperse. LLE in brace s/p OR. some distal limb movement. RLE 4+/5   Sensation: Intact to light touch and pinprick throughout. no right/left confusion. no extinction to tactile on DSS.   Reflexes: 1+ throughout at biceps, brachioradialis, triceps, patellars and ankles bilaterally and equal. No clonus. R toe and L toe were both downgoing.  Coordination: No dysmetria on FNF    Gait: deferred     I personally reviewed the below data/images/labs:  CBC Full  -  ( 25 Jan 2023 06:43 )  WBC Count : 11.25 K/uL  RBC Count : 2.86 M/uL  Hemoglobin : 8.4 g/dL  Hematocrit : 26.2 %  Platelet Count - Automated : 510 K/uL  Mean Cell Volume : 91.6 fL  Mean Cell Hemoglobin : 29.4 pg  Mean Cell Hemoglobin Concentration : 32.1 gm/dL  Auto Neutrophil # : x  Auto Lymphocyte # : x  Auto Monocyte # : x  Auto Eosinophil # : x  Auto Basophil # : x  Auto Neutrophil % : x  Auto Lymphocyte % : x  Auto Monocyte % : x  Auto Eosinophil % : x  Auto Basophil % : x      01-25    138  |  102  |  15  ----------------------------<  111<H>  3.7   |  27  |  0.84    Ca    8.3<L>      25 Jan 2023 06:43  Phos  3.9     01-25  Mg     2.20     01-25    TPro  6.1  /  Alb  2.6<L>  /  TBili  0.5  /  DBili  <0.2  /  AST  22  /  ALT  51<H>  /  AlkPhos  143<H>  01-25      MRI:   IMPRESSION:    Chronic infarcts of the right frontal subcortical and right centrum   semiovale. No new areas of infarct are identified. Unchanged scattered   foci of hemosiderin.    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   Differential diagnosis includes infection, sarcoidosis, lymphoma and   CLIPPERS (chronic lymphocytic inflammation with pontine perivascular   enhancement unresponsive to steroids).    --- End of Report ---    < end of copied text >  < from: MR Head w/wo IV Cont (12.29.22 @ 13:39) >  IMPRESSION:    Chronic infarcts of the right frontal subcortical and right centrum   semiovale. No new areas of infarct are identified. Unchanged scattered   foci of hemosiderin.    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   Differential diagnosis includes infection, sarcoidosis, lymphoma and   CLIPPERS (chronic lymphocytic inflammation with pontine perivascular   enhancement unresponsive to steroids).    --- End of Report ---    < end of copied text >

## 2023-01-25 NOTE — ADVANCED PRACTICE NURSE CONSULT - REASON FOR CONSULT
Patient seen on skin care rounds for NPWT/VAC placement to left lower leg fasciotomy sites.   Plastic surgery at bedside present for wound assessment, unable to primary close wounds at this time, VAC therapy initiated. Please see A&I flowsheet for full wound assessment details. Wound care team will continue to see patient on M W F schedule.  Patient seen on skin care rounds for NPWT/VAC placement to left lower leg fasciotomy sites. Patient with H/O multiple CVA in last year with residual deficit of word finding difficulty, recent admission for unresponsiveness fth seizure activity @ OSH, who presents with AMS today from nursing facility, found to have acute onset LLE DVT L pop v, L PT v, and L gastroc v cb compartment syndrome of LLE. S/p left 4 quadrant decompression fasciotomy, RTOR for additional washout and re-evaluation of muscle viability. Plastic surgery resident at bedside present for wound assessment, unable to primary close wounds at this time, VAC therapy initiated.

## 2023-01-25 NOTE — ADVANCED PRACTICE NURSE CONSULT - ASSESSMENT
General: A&Ox4, verbal with difficulty getting words out. Skin warm, dry.     Vascula LLE: Warm to touch, no temperature changes noted. nonpitting edema. Capillary refill <3 seconds. Palpable DP/PT pulses, with biphasic doppler sounds. Slight movement to toes. Numbness noted with some sensation upon palpation.     Left lower leg with two fasciotomy sites:   Left medial lower leg- [id number]   Left lateral lower leg- [id number]. Both wounds exposing 90% edematous red moist viable muscle and 10% adipose at wound edges. Moderate amount of sanguinous drainage, no odor. Painful to touch- patient premedicated prior with additional pain medication administered during application. Periwound skin with edema, otherwise intact. No induration, no erythema, no increased warmth. Goals of care: Manage/contain drainage, approximate edges, protect periwound skin.

## 2023-01-25 NOTE — PROGRESS NOTE ADULT - ASSESSMENT
52-year-old  M  TIAs hx TIAS and strokes initially thought to be 2/2 testosterone with recent admission for seizures and AMS here with confusion. PE with psychomotor slowing, aphasia frontal reflexes bilaterally distal LE weakness here iwth AMS, found to have a DVT iun the LLE MRi on 12/29/22 showed chronic right frontalm centrum semiovale strokes and bilateral lentiform and pontine enhancemeent  -s/p fasciotomy for left leg compartment syndrome 1/18  s/p wash out on 1/23   vEEG neg     Impression: AMS with bilateral pontine and lentiform enhancement ? Clippers vs other; LLE 2/2 compartment syndrome     When stabilized further w/u for above  -LP with extensive CSF studies unrevealing  -Body imaging unrevealing, pelvic LNS not amenable to BX as per IR  -Repeat MRI brain w/w/o con, would obtain MRI C-spine w/con if MR done   -After MRI will discuss role of pulse steroids followed by high dose steroid with potential comparison MRI, seems to have improved. may consider holding off   -Would reach out to NSX if amenable to biopsy  -On Keppra 1500 mg BID and Vimpat 100 mg BID  outpatient hypercoag workup neg   - heparin drip. statin held given rise in LFTs   Braxton Forde MD  Vascular Neurology  Office: [phone]

## 2023-01-25 NOTE — PROGRESS NOTE ADULT - SUBJECTIVE AND OBJECTIVE BOX
Name of Patient : TAMI DUNHAM  MRN: [id number]  Date of visit: 01-25-23       Subjective: Patient seen and examined. No new events except as noted.   doing okay     REVIEW OF SYSTEMS:    CONSTITUTIONAL: No weakness, fevers or chills  EYES/ENT: No visual changes;  No vertigo or throat pain   NECK: No pain or stiffness  RESPIRATORY: No cough, wheezing, hemoptysis; No shortness of breath  CARDIOVASCULAR: No chest pain or palpitations  GASTROINTESTINAL: No abdominal or epigastric pain. No nausea, vomiting, or hematemesis; No diarrhea or constipation. No melena or hematochezia.  GENITOURINARY: No dysuria, frequency or hematuria  NEUROLOGICAL: No numbness or weakness  SKIN: No itching, burning, rashes, or lesions   All other review of systems is negative unless indicated above.    MEDICATIONS:  MEDICATIONS  (STANDING):  aspirin enteric coated 81 milliGRAM(s) Oral daily  chlorhexidine 2% Cloths 1 Application(s) Topical daily  heparin  Infusion. 2000 Unit(s)/Hr (20 mL/Hr) IV Continuous <Continuous>  lacosamide IVPB 100 milliGRAM(s) IV Intermittent every 12 hours  levETIRAcetam  IVPB 1500 milliGRAM(s) IV Intermittent every 12 hours  lidocaine   4% Patch 1 Patch Transdermal daily  multivitamin 1 Tablet(s) Oral daily  mupirocin 2% Ointment 1 Application(s) Topical two times a day  pantoprazole  Injectable 40 milliGRAM(s) IV Push two times a day  senna 2 Tablet(s) Oral at bedtime      PHYSICAL EXAM:  T(C): 37 (01-25-23 @ 17:30), Max: 37 (01-25-23 @ 17:30)  HR: 88 (01-25-23 @ 17:30) (75 - 88)  BP: 101/70 (01-25-23 @ 17:30) (100/65 - 120/71)  RR: 17 (01-25-23 @ 17:30) (17 - 20)  SpO2: 100% (01-25-23 @ 10:10) (100% - 100%)  Wt(kg): --  I&O's Summary    24 Jan 2023 07:01  -  25 Jan 2023 07:00  --------------------------------------------------------  IN: 720 mL / OUT: 750 mL / NET: -30 mL    25 Jan 2023 07:01  -  25 Jan 2023 22:31  --------------------------------------------------------  IN: 0 mL / OUT: 780 mL / NET: -780 mL          Appearance: Normal	  HEENT:  PERRLA   Lymphatic: No lymphadenopathy   Cardiovascular: Normal S1 S2, no JVD  Respiratory: normal effort , clear  Gastrointestinal:  Soft, Non-tender  Skin: No rashes,  warm to touch  Psychiatry:  Mood & affect appropriate  Musculuskeletal: LE Dressing     All labs, Imaging and EKGs personally reviewed     01-24-23 @ 07:01  -  01-25-23 @ 07:00  --------------------------------------------------------  IN: 720 mL / OUT: 750 mL / NET: -30 mL    01-25-23 @ 07:01 - 01-25-23 @ 22:31  --------------------------------------------------------  IN: 0 mL / OUT: 780 mL / NET: -780 mL                            8.4    11.25 )-----------( 510      ( 25 Jan 2023 06:43 )             26.2               01-25    138  |  102  |  15  ----------------------------<  111<H>  3.7   |  27  |  0.84    Ca    8.3<L>      25 Jan 2023 06:43  Phos  3.9     01-25  Mg     2.20     01-25    TPro  6.1  /  Alb  2.6<L>  /  TBili  0.5  /  DBili  <0.2  /  AST  22  /  ALT  51<H>  /  AlkPhos  143<H>  01-25    PTT - ( 25 Jan 2023 06:43 )  PTT:83.8 sec

## 2023-01-25 NOTE — PROGRESS NOTE ADULT - SUBJECTIVE AND OBJECTIVE BOX
Vascular Surgery Progress Note     Subjective/24hour Events: No acute events overnight.     Vital Signs:  Vital Signs Last 24 Hrs  T(C): 36.6 (25 Jan 2023 06:00), Max: 36.6 (25 Jan 2023 06:00)  T(F): 97.8 (25 Jan 2023 06:00), Max: 97.8 (25 Jan 2023 06:00)  HR: 81 (25 Jan 2023 06:00) (74 - 86)  BP: 118/75 (25 Jan 2023 06:00) (113/70 - 120/71)  BP(mean): --  RR: 18 (25 Jan 2023 06:00) (17 - 18)  SpO2: 100% (25 Jan 2023 06:00) (100% - 100%)    Parameters below as of 25 Jan 2023 06:00  Patient On (Oxygen Delivery Method): room air            I&O's Detail    24 Jan 2023 07:01  -  25 Jan 2023 07:00  --------------------------------------------------------  IN:    Oral Fluid: 720 mL  Total IN: 720 mL    OUT:    Voided (mL): 750 mL  Total OUT: 750 mL    Total NET: -30 mL            MEDICATIONS  (STANDING):  aspirin enteric coated 81 milliGRAM(s) Oral daily  chlorhexidine 2% Cloths 1 Application(s) Topical daily  heparin  Infusion. 2000 Unit(s)/Hr (20 mL/Hr) IV Continuous <Continuous>  lacosamide IVPB 100 milliGRAM(s) IV Intermittent every 12 hours  levETIRAcetam  IVPB 1500 milliGRAM(s) IV Intermittent every 12 hours  lidocaine   4% Patch 1 Patch Transdermal daily  mupirocin 2% Ointment 1 Application(s) Topical two times a day  pantoprazole  Injectable 40 milliGRAM(s) IV Push two times a day  piperacillin/tazobactam IVPB.. 3.375 Gram(s) IV Intermittent every 8 hours    MEDICATIONS  (PRN):  heparin   Injectable 6500 Unit(s) IV Push every 6 hours PRN For aPTT less than 40  heparin   Injectable 3000 Unit(s) IV Push every 6 hours PRN For aPTT between 40 - 57  heparin   Injectable 6500 Unit(s) IV Push every 6 hours PRN For aPTT less than 40  heparin   Injectable 3000 Unit(s) IV Push every 6 hours PRN For aPTT between 40 - 57  oxyCODONE    IR 5 milliGRAM(s) Oral every 6 hours PRN Severe Pain (7 - 10)        Physical Exam:  General: NAD, resting comfortably in bed  Pulmonary: Nonlabored breathing, no respiratory distress  Abdominal: soft, appropriately tender, nondistended  L wound dressing, not soiled  Extremities: [facility]    Labs:    01-24    138  |  100  |  14  ----------------------------<  151<H>  4.3   |  23  |  0.68    Ca    9.0      24 Jan 2023 07:00  Phos  4.1     01-24  Mg     2.20     01-24                              9.0    12.62 )-----------( 517      ( 24 Jan 2023 15:50 )             28.7     PTT - ( 25 Jan 2023 06:43 )  PTT:83.8 sec    CAPILLARY BLOOD GLUCOSE              Imaging:

## 2023-01-26 LAB
ALBUMIN SERPL ELPH-MCNC: 2.6 G/DL — LOW (ref 3.3–5)
ALP SERPL-CCNC: 130 U/L — HIGH (ref 40–120)
ALT FLD-CCNC: 40 U/L — SIGNIFICANT CHANGE UP (ref 4–41)
ANION GAP SERPL CALC-SCNC: 6 MMOL/L — LOW (ref 7–14)
APTT BLD: 87.1 SEC — HIGH (ref 27–36.3)
AST SERPL-CCNC: 12 U/L — SIGNIFICANT CHANGE UP (ref 4–40)
BILIRUB DIRECT SERPL-MCNC: <0.2 MG/DL — SIGNIFICANT CHANGE UP (ref 0–0.3)
BILIRUB INDIRECT FLD-MCNC: >0.2 MG/DL — SIGNIFICANT CHANGE UP (ref 0–1)
BILIRUB SERPL-MCNC: 0.4 MG/DL — SIGNIFICANT CHANGE UP (ref 0.2–1.2)
BILIRUB SERPL-MCNC: 0.4 MG/DL — SIGNIFICANT CHANGE UP (ref 0.2–1.2)
BLD GP AB SCN SERPL QL: NEGATIVE — SIGNIFICANT CHANGE UP
BUN SERPL-MCNC: 14 MG/DL — SIGNIFICANT CHANGE UP (ref 7–23)
CALCIUM SERPL-MCNC: 8.5 MG/DL — SIGNIFICANT CHANGE UP (ref 8.4–10.5)
CHLORIDE SERPL-SCNC: 102 MMOL/L — SIGNIFICANT CHANGE UP (ref 98–107)
CO2 SERPL-SCNC: 28 MMOL/L — SIGNIFICANT CHANGE UP (ref 22–31)
CREAT SERPL-MCNC: 0.8 MG/DL — SIGNIFICANT CHANGE UP (ref 0.5–1.3)
EGFR: 106 ML/MIN/1.73M2 — SIGNIFICANT CHANGE UP
GLUCOSE SERPL-MCNC: 147 MG/DL — HIGH (ref 70–99)
HCT VFR BLD CALC: 26.6 % — LOW (ref 39–50)
HCT VFR BLD CALC: 27.6 % — LOW (ref 39–50)
HGB BLD-MCNC: 8.3 G/DL — LOW (ref 13–17)
HGB BLD-MCNC: 8.9 G/DL — LOW (ref 13–17)
MAGNESIUM SERPL-MCNC: 1.9 MG/DL — SIGNIFICANT CHANGE UP (ref 1.6–2.6)
MCHC RBC-ENTMCNC: 29.2 PG — SIGNIFICANT CHANGE UP (ref 27–34)
MCHC RBC-ENTMCNC: 29.4 PG — SIGNIFICANT CHANGE UP (ref 27–34)
MCHC RBC-ENTMCNC: 31.2 GM/DL — LOW (ref 32–36)
MCHC RBC-ENTMCNC: 32.2 GM/DL — SIGNIFICANT CHANGE UP (ref 32–36)
MCV RBC AUTO: 91.1 FL — SIGNIFICANT CHANGE UP (ref 80–100)
MCV RBC AUTO: 93.7 FL — SIGNIFICANT CHANGE UP (ref 80–100)
NRBC # BLD: 0 /100 WBCS — SIGNIFICANT CHANGE UP (ref 0–0)
NRBC # BLD: 0 /100 WBCS — SIGNIFICANT CHANGE UP (ref 0–0)
NRBC # FLD: 0 K/UL — SIGNIFICANT CHANGE UP (ref 0–0)
NRBC # FLD: 0 K/UL — SIGNIFICANT CHANGE UP (ref 0–0)
PHOSPHATE SERPL-MCNC: 4 MG/DL — SIGNIFICANT CHANGE UP (ref 2.5–4.5)
PLATELET # BLD AUTO: 549 K/UL — HIGH (ref 150–400)
PLATELET # BLD AUTO: 602 K/UL — HIGH (ref 150–400)
POTASSIUM SERPL-MCNC: 3.8 MMOL/L — SIGNIFICANT CHANGE UP (ref 3.5–5.3)
POTASSIUM SERPL-SCNC: 3.8 MMOL/L — SIGNIFICANT CHANGE UP (ref 3.5–5.3)
PROT SERPL-MCNC: 5.9 G/DL — LOW (ref 6–8.3)
RBC # BLD: 2.84 M/UL — LOW (ref 4.2–5.8)
RBC # BLD: 3.03 M/UL — LOW (ref 4.2–5.8)
RBC # FLD: 15.1 % — HIGH (ref 10.3–14.5)
RBC # FLD: 15.3 % — HIGH (ref 10.3–14.5)
RH IG SCN BLD-IMP: NEGATIVE — SIGNIFICANT CHANGE UP
SODIUM SERPL-SCNC: 136 MMOL/L — SIGNIFICANT CHANGE UP (ref 135–145)
WBC # BLD: 12.48 K/UL — HIGH (ref 3.8–10.5)
WBC # BLD: 12.51 K/UL — HIGH (ref 3.8–10.5)
WBC # FLD AUTO: 12.48 K/UL — HIGH (ref 3.8–10.5)
WBC # FLD AUTO: 12.51 K/UL — HIGH (ref 3.8–10.5)

## 2023-01-26 PROCEDURE — 99232 SBSQ HOSP IP/OBS MODERATE 35: CPT

## 2023-01-26 RX ORDER — OXYCODONE HYDROCHLORIDE 5 MG/1
5 TABLET ORAL EVERY 6 HOURS
Refills: 0 | Status: DISCONTINUED | OUTPATIENT
Start: 2023-01-26 | End: 2023-02-02

## 2023-01-26 RX ORDER — HYDROMORPHONE HYDROCHLORIDE 2 MG/ML
1 INJECTION INTRAMUSCULAR; INTRAVENOUS; SUBCUTANEOUS ONCE
Refills: 0 | Status: DISCONTINUED | OUTPATIENT
Start: 2023-01-26 | End: 2023-01-26

## 2023-01-26 RX ADMIN — HEPARIN SODIUM 2200 UNIT(S)/HR: 5000 INJECTION INTRAVENOUS; SUBCUTANEOUS at 07:18

## 2023-01-26 RX ADMIN — LEVETIRACETAM 400 MILLIGRAM(S): 250 TABLET, FILM COATED ORAL at 18:06

## 2023-01-26 RX ADMIN — SENNA PLUS 2 TABLET(S): 8.6 TABLET ORAL at 21:19

## 2023-01-26 RX ADMIN — LEVETIRACETAM 400 MILLIGRAM(S): 250 TABLET, FILM COATED ORAL at 05:30

## 2023-01-26 RX ADMIN — HYDROMORPHONE HYDROCHLORIDE 1 MILLIGRAM(S): 2 INJECTION INTRAMUSCULAR; INTRAVENOUS; SUBCUTANEOUS at 04:00

## 2023-01-26 RX ADMIN — PANTOPRAZOLE SODIUM 40 MILLIGRAM(S): 20 TABLET, DELAYED RELEASE ORAL at 18:14

## 2023-01-26 RX ADMIN — HYDROMORPHONE HYDROCHLORIDE 1 MILLIGRAM(S): 2 INJECTION INTRAMUSCULAR; INTRAVENOUS; SUBCUTANEOUS at 03:45

## 2023-01-26 RX ADMIN — PANTOPRAZOLE SODIUM 40 MILLIGRAM(S): 20 TABLET, DELAYED RELEASE ORAL at 05:24

## 2023-01-26 RX ADMIN — MUPIROCIN 1 APPLICATION(S): 20 OINTMENT TOPICAL at 05:30

## 2023-01-26 RX ADMIN — OXYCODONE HYDROCHLORIDE 5 MILLIGRAM(S): 5 TABLET ORAL at 16:06

## 2023-01-26 RX ADMIN — HEPARIN SODIUM 2200 UNIT(S)/HR: 5000 INJECTION INTRAVENOUS; SUBCUTANEOUS at 03:10

## 2023-01-26 RX ADMIN — HEPARIN SODIUM 2200 UNIT(S)/HR: 5000 INJECTION INTRAVENOUS; SUBCUTANEOUS at 19:19

## 2023-01-26 RX ADMIN — MUPIROCIN 1 APPLICATION(S): 20 OINTMENT TOPICAL at 18:12

## 2023-01-26 RX ADMIN — HEPARIN SODIUM 2200 UNIT(S)/HR: 5000 INJECTION INTRAVENOUS; SUBCUTANEOUS at 16:36

## 2023-01-26 RX ADMIN — Medication 81 MILLIGRAM(S): at 12:31

## 2023-01-26 RX ADMIN — HYDROMORPHONE HYDROCHLORIDE 1 MILLIGRAM(S): 2 INJECTION INTRAMUSCULAR; INTRAVENOUS; SUBCUTANEOUS at 08:26

## 2023-01-26 RX ADMIN — OXYCODONE HYDROCHLORIDE 5 MILLIGRAM(S): 5 TABLET ORAL at 15:06

## 2023-01-26 RX ADMIN — HYDROMORPHONE HYDROCHLORIDE 1 MILLIGRAM(S): 2 INJECTION INTRAMUSCULAR; INTRAVENOUS; SUBCUTANEOUS at 08:41

## 2023-01-26 RX ADMIN — OXYCODONE HYDROCHLORIDE 5 MILLIGRAM(S): 5 TABLET ORAL at 23:53

## 2023-01-26 RX ADMIN — HEPARIN SODIUM 2200 UNIT(S)/HR: 5000 INJECTION INTRAVENOUS; SUBCUTANEOUS at 08:22

## 2023-01-26 RX ADMIN — CHLORHEXIDINE GLUCONATE 1 APPLICATION(S): 213 SOLUTION TOPICAL at 12:29

## 2023-01-26 RX ADMIN — LACOSAMIDE 120 MILLIGRAM(S): 50 TABLET ORAL at 05:29

## 2023-01-26 RX ADMIN — Medication 1 TABLET(S): at 12:30

## 2023-01-26 RX ADMIN — LACOSAMIDE 120 MILLIGRAM(S): 50 TABLET ORAL at 17:30

## 2023-01-26 RX ADMIN — POLYETHYLENE GLYCOL 3350 17 GRAM(S): 17 POWDER, FOR SOLUTION ORAL at 12:32

## 2023-01-26 RX ADMIN — LIDOCAINE 1 PATCH: 4 CREAM TOPICAL at 00:00

## 2023-01-26 NOTE — PROVIDER CONTACT NOTE (OTHER) - SITUATION
Patient has new abdominal LUQ hematoma
Patient has zosyn running and needs to go to MRI
Patient noted with another episode of saturated LLE dressing with wound vac in place ot medial aspect of leg. Wound vac suction holding at 125 mmHg.
Pt LLE fasciotomy wound oozing serosanguinous fluid. Pt VSS, and asymptomatic.

## 2023-01-26 NOTE — ADVANCED PRACTICE NURSE CONSULT - ASSESSMENT
Upon assessment Left lateral leg dressing noted with large amount of joseph blood and clots beneath dressing, once dressing removed, oozing noted from wound edges from 12-6oclock. No pulsating bleed noted. Tissue red, moist viable muscle exposed. Surgicell applied to base of wound, covered with Aquacel hydrofiber, gauze, abdominal pad and wrapped with kerlix.    Left medial dressing without any signs of active bleeding, as per plastics team recommended to keep NPWT dressing in place and keep on continuous suction.

## 2023-01-26 NOTE — PROGRESS NOTE ADULT - SUBJECTIVE AND OBJECTIVE BOX
SURGERY  Pager: #[phone]    INTERVAL EVENTS/SUBJECTIVE: No acute events overnight.     ______________________________________________  OBJECTIVE:   T(C): 37.1 (01-26-23 @ 06:30), Max: 37.1 (01-26-23 @ 01:00)  HR: 89 (01-26-23 @ 06:30) (80 - 89)  BP: 115/75 (01-26-23 @ 06:30) (100/65 - 115/75)  RR: 18 (01-26-23 @ 06:30) (17 - 20)  SpO2: 100% (01-26-23 @ 06:30) (100% - 100%)  Wt(kg): --  CAPILLARY BLOOD GLUCOSE        I&O's Detail    25 Jan 2023 07:01  -  26 Jan 2023 07:00  --------------------------------------------------------  IN:  Total IN: 0 mL    OUT:    Voided (mL): 780 mL  Total OUT: 780 mL    Total NET: -780 mL          Physical Exam:  General: NAD, resting comfortably in bed  Pulmonary: Nonlabored breathing, no respiratory distress  Abdominal: soft, nontender, nondistended   Extremities: WWP, LLE with wound vac in place, holding suction; sanguinous output in cannister   ______________________________________________  LABS:  CBC Full  -  ( 26 Jan 2023 06:00 )  WBC Count : 12.48 K/uL  RBC Count : 2.84 M/uL  Hemoglobin : 8.3 g/dL  Hematocrit : 26.6 %  Platelet Count - Automated : 549 K/uL  Mean Cell Volume : 93.7 fL  Mean Cell Hemoglobin : 29.2 pg  Mean Cell Hemoglobin Concentration : 31.2 gm/dL  Auto Neutrophil # : x  Auto Lymphocyte # : x  Auto Monocyte # : x  Auto Eosinophil # : x  Auto Basophil # : x  Auto Neutrophil % : x  Auto Lymphocyte % : x  Auto Monocyte % : x  Auto Eosinophil % : x  Auto Basophil % : x    01-26    136  |  102  |  14  ----------------------------<  147<H>  3.8   |  28  |  0.80    Ca    8.5      26 Jan 2023 06:00  Phos  4.0     01-26  Mg     1.90     01-26    TPro  5.9<L>  /  Alb  2.6<L>  /  TBili  0.4  /  DBili  <0.2  /  AST  12  /  ALT  40  /  AlkPhos  130<H>  01-26    _____________________________________________  RADIOLOGY:

## 2023-01-26 NOTE — PROVIDER CONTACT NOTE (OTHER) - ASSESSMENT
Patient continues on IV heparin at 22 mL/hr; remains A&O 4 with forgetfulness, VS are WNL. Morning Hgb 8.2. Denies lightheadedness or dizziness.
Pt LLE fasciotomy wound oozing serosanguinous fluid. Pt VSS, and asymptomatic.
A&Ox3-4; forgetful of situation at times. Zosyn started at 5 as per EMAR, MRI scheduled for 7.
A&Ox3-4; forgetful of situation at times. New LUQ hematoma as per patient. New hematoma has been circled. RUQ hematoma previously present and circled, within borders of [place].

## 2023-01-26 NOTE — PROGRESS NOTE ADULT - ASSESSMENT
52 year old with recent admission for change in MS , presents with change in MS and associated leukocytosis.  Found to have acute dvt of the left leg with tender/ distended leg  S/p fasciotomy and RTOR    Now with Vac in place    Oberve off antibiotics  Wound care    Call ID service with questions

## 2023-01-26 NOTE — PROVIDER CONTACT NOTE (OTHER) - REASON
Patient has new abdominal LUQ hematoma
Pt LLE fasciotomy wound oozing serosanguinous fluid. Pt VSS, and asymptomatic.
Saturated LLE dressing
Patient has zosyn running and needs to go to MRI

## 2023-01-26 NOTE — PROVIDER CONTACT NOTE (OTHER) - BACKGROUND
Pt admitted for AMS and LLE swelling.
s/p fasciotomy and LLE wound vac placement
Pt admitted for AMS and LLE swelling.
Pt admitted for AMS and LLE swelling.

## 2023-01-26 NOTE — PROGRESS NOTE ADULT - ASSESSMENT
Patient is a 52-year-old male with a past medical history of CVA/TIA, ASD/PFO?, hyperlipidemia presents emerged department today with altered mental status from his nursing facility.  Patient just had a stay in this hospital when he was found unresponsive at home.  An extensive work-up with no significant etiology found.  There were thoughts that it was related to seizure activity however neurology did not find any seizure activity on their work-up.  Patient went to a nursing facility and rehab.  Was doing well until yesterday his parents noted him to be somewhat confused and having word finding difficulties.  Today his speech was worse when noticed by the wife.  He is also noted to have odd behavior where he was pulling at sheets and the strings of the masks.  He did not have this behavior before.  Only residual deficit from his previous strokes with some random word finding abilities however his speech today is reported to be significantly worse with worse word finding.  The patient denies any pain at this time.  He does acknowledge that he is having trouble speaking.  There is also report from the family and patient is complaining of left calf pain.  This pain is much more severe than it has been in the past.    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, SP LP, no malignancy on cyto   tele monitoring   Plan for MRI per Neuro recs, noted, follow up neuro recs     # Lekucytosis  Pan CX   Infectious W/U   CTA negative   ID eval appreciated    Hematology eval appreciated, follow up recs     #  Compartment syndrome   S/P Fasciotomy   Vascular follow up appreciated   dressing per vascular follow up recs   ID eval appreciated, cont zosyn, total of 7 days   as per vascualr, no need for BKLA>alive muscle, wound care  plastic surg eval called for possible flap , appreciated recs, wound vac care   resume AC         # Elevated LFTs  trending up  hold tyleniol and statin  trend for now  if cont to trend up, will obtain US   likely due to acute anemia       # ANemia  Occult negative  Total of 3 units PRBC  serial CBC   GI follow up appreciated

## 2023-01-26 NOTE — ADVANCED PRACTICE NURSE CONSULT - REASON FOR CONSULT
Patient seen after call received from primary RN for joseph blood in canister and saturated kerlix wrapping to Left lower leg. NPWT stopped, Vascular surgery and Plastic surgery (Shari Rios) called to bedside. Patient premedicated prior to dressing removal. Patient currently on a heparin drip on 22u/hr. Current Chart reviewed: H/H 8.3/26.6, platelets 549, PTT 87.1.

## 2023-01-26 NOTE — PROVIDER CONTACT NOTE (OTHER) - ACTION/TREATMENT ORDERED:
Plastics team (Shari Rios) made aware and ordered to reinforce current dressing. Primary RN reinforced dressing, awaiting further recommendations from team.
As per ACP Roland Hernandez, ok to give zosyn before MRI , pause when patient goes to MRI and restart upon arrival back to unit. Will endorse to day shift nurse
Provider made aware. Provider came to bedside to assess patient, awaiting new orders at this time
Provider notified, provider came to examine leg, and deemed oozing to be normal post fasciotomy. Provider ordered no dressing change. No other orders given.

## 2023-01-26 NOTE — PROVIDER CONTACT NOTE (OTHER) - RECOMMENDATIONS
Provider made aware. Provider came to bedside to assess patient.
Notify provider
Consult from provider.
Wound vac continues as ordered, drainage monitored.

## 2023-01-26 NOTE — PROGRESS NOTE ADULT - SUBJECTIVE AND OBJECTIVE BOX
Follow Up:      Inverval History/ROS:Patient is a 52y old  Male who presents with a chief complaint of Altered mental status     (25 Jan 2023 14:31)    No fever      Allergies    No Known Allergies    Intolerances        ANTIMICROBIALS:      OTHER MEDS:  aspirin enteric coated 81 milliGRAM(s) Oral daily  chlorhexidine 2% Cloths 1 Application(s) Topical daily  heparin   Injectable 6500 Unit(s) IV Push every 6 hours PRN  heparin   Injectable 3000 Unit(s) IV Push every 6 hours PRN  heparin   Injectable 6500 Unit(s) IV Push every 6 hours PRN  heparin   Injectable 3000 Unit(s) IV Push every 6 hours PRN  heparin  Infusion. 2000 Unit(s)/Hr IV Continuous <Continuous>  lacosamide IVPB 100 milliGRAM(s) IV Intermittent every 12 hours  levETIRAcetam  IVPB 1500 milliGRAM(s) IV Intermittent every 12 hours  lidocaine   4% Patch 1 Patch Transdermal daily  multivitamin 1 Tablet(s) Oral daily  mupirocin 2% Ointment 1 Application(s) Topical two times a day  oxyCODONE    IR 5 milliGRAM(s) Oral every 6 hours PRN  pantoprazole  Injectable 40 milliGRAM(s) IV Push two times a day  polyethylene glycol 3350 17 Gram(s) Oral daily PRN  senna 2 Tablet(s) Oral at bedtime      Vital Signs Last 24 Hrs  T(C): 36.6 (26 Jan 2023 12:27), Max: 37.1 (26 Jan 2023 01:00)  T(F): 97.8 (26 Jan 2023 12:27), Max: 98.7 (26 Jan 2023 01:00)  HR: 80 (26 Jan 2023 12:27) (80 - 89)  BP: 100/66 (26 Jan 2023 12:27) (100/66 - 115/75)  BP(mean): --  RR: 16 (26 Jan 2023 12:27) (16 - 18)  SpO2: 100% (26 Jan 2023 12:27) (98% - 100%)    Parameters below as of 26 Jan 2023 12:27  Patient On (Oxygen Delivery Method): room air        PHYSICAL EXAM:  General: [x ] non-toxic  HEAD/EYES: [ ] PERRL [ ] white sclera [ ] icterus  ENT:  [ ] normal [x ] supple [ ] thrush [ ] pharyngeal exudate  Cardiovascular:   [ ] murmur [x ] normal [ ] PPM/AICD  Respiratory:  [x ] clear to ausculation bilaterally  GI:  [ ]x soft, non-tender, normal bowel sounds  :  [ ] fish [ x] no CVA tenderness   Musculoskeletal:  [ ] no synovitis  Neurologic:  [ ] non-focal exam   Skin:  [x ] vac in place  Lymph: [x ] no lymphadenopathy  Psychiatric:  [ ] appropriate affect [ ] alert & oriented  Lines:  [x ] no phlebitis [ ] central line                                8.3    12.48 )-----------( 549      ( 26 Jan 2023 06:00 )             26.6       01-26    136  |  102  |  14  ----------------------------<  147<H>  3.8   |  28  |  0.80    Ca    8.5      26 Jan 2023 06:00  Phos  4.0     01-26  Mg     1.90     01-26    TPro  5.9<L>  /  Alb  2.6<L>  /  TBili  0.4  /  DBili  <0.2  /  AST  12  /  ALT  40  /  AlkPhos  130<H>  01-26          MICROBIOLOGY:    RADIOLOGY:

## 2023-01-26 NOTE — ADVANCED PRACTICE NURSE CONSULT - REASON FOR CONSULT
Followed up with patient for NPWT dressing assessment. Dressing intact, no strikethrough noted. Canister with scant serosanguinous drainage.     Please contact Wound/Ostomy Care Service Line if we can be of further assistance (ext [phone]).

## 2023-01-26 NOTE — PROGRESS NOTE ADULT - ASSESSMENT
52M w hx of multiple CVA in last year with residual deficit of word finding difficulty, recent admission for unresponsiveness [facility] seizure activity @ OSH, who presents with AMS today from nursing facility, found to have acute onset LLE DVT L pop v, L PT v, and L gastroc v cb compartment syndrome of LLE. S/p left 4 quadrant decompression fasciotomy, RTOR for additional washout and re-evaluation of muscle viability .    Plan:  - OK for full AC from vascular surgery perspective  - Appreciate wound vac management per vac team   - Plastics team following for interval closure of fasciotomy sites   - Please call back with questions    C Team Surgery  #[id number]

## 2023-01-26 NOTE — ADVANCED PRACTICE NURSE CONSULT - ASSESSMENT
Patient with moderate strikethrough to left lateral leg dressing, Left medial NPWT intact with sanguinous drainage in tubing and small sanguinous drainage in canister- Plastics team made aware came to bedside to change dressing. Discussion had for possible removal of left medial lower leg vac due to blood in canister- as per Plastic surgery vac to be left in place due to minimal sanguinous drainage. Primary RN made aware to notify plastic surgery if any changes to output noted.  Patient with moderate strikethrough to left lateral leg dressing, Left medial NPWT intact with sanguinous drainage in tubing and small sanguinous drainage in canister- Plastics team made aware came to bedside to change dressing. Discussion had for possible removal of left medial lower leg vac due to blood in canister- as per Plastic surgery resident (Bridger Wayne) vac to be left in place due to minimal sanguinous drainage (10ml). Primary RN made aware to notify plastic surgery if any changes to output noted.

## 2023-01-26 NOTE — CHART NOTE - NSCHARTNOTEFT_GEN_A_CORE
Notified by RN that patients dressing was saturated with blood. Wound care RN and plastics team changed dressing at bedside. Vascular team was also made aware. Hg at that time stable 8.3. Repeat CBC ordered for afternoon came back again w/ a stable Hg 8.9. Dressing currently C/D/I. Will continue to monitor. Discussed with Dr. Diez.

## 2023-01-27 LAB
ALBUMIN SERPL ELPH-MCNC: 2.8 G/DL — LOW (ref 3.3–5)
ALP SERPL-CCNC: 124 U/L — HIGH (ref 40–120)
ALT FLD-CCNC: 30 U/L — SIGNIFICANT CHANGE UP (ref 4–41)
ANION GAP SERPL CALC-SCNC: 7 MMOL/L — SIGNIFICANT CHANGE UP (ref 7–14)
APTT BLD: 67.3 SEC — HIGH (ref 27–36.3)
AST SERPL-CCNC: 13 U/L — SIGNIFICANT CHANGE UP (ref 4–40)
BILIRUB SERPL-MCNC: 0.5 MG/DL — SIGNIFICANT CHANGE UP (ref 0.2–1.2)
BUN SERPL-MCNC: 10 MG/DL — SIGNIFICANT CHANGE UP (ref 7–23)
CALCIUM SERPL-MCNC: 8.8 MG/DL — SIGNIFICANT CHANGE UP (ref 8.4–10.5)
CHLORIDE SERPL-SCNC: 103 MMOL/L — SIGNIFICANT CHANGE UP (ref 98–107)
CO2 SERPL-SCNC: 27 MMOL/L — SIGNIFICANT CHANGE UP (ref 22–31)
CREAT SERPL-MCNC: 0.72 MG/DL — SIGNIFICANT CHANGE UP (ref 0.5–1.3)
EGFR: 110 ML/MIN/1.73M2 — SIGNIFICANT CHANGE UP
GLUCOSE SERPL-MCNC: 111 MG/DL — HIGH (ref 70–99)
HCT VFR BLD CALC: 27 % — LOW (ref 39–50)
HGB BLD-MCNC: 8.4 G/DL — LOW (ref 13–17)
MAGNESIUM SERPL-MCNC: 2.1 MG/DL — SIGNIFICANT CHANGE UP (ref 1.6–2.6)
MCHC RBC-ENTMCNC: 28.8 PG — SIGNIFICANT CHANGE UP (ref 27–34)
MCHC RBC-ENTMCNC: 31.1 GM/DL — LOW (ref 32–36)
MCV RBC AUTO: 92.5 FL — SIGNIFICANT CHANGE UP (ref 80–100)
NRBC # BLD: 0 /100 WBCS — SIGNIFICANT CHANGE UP (ref 0–0)
NRBC # FLD: 0 K/UL — SIGNIFICANT CHANGE UP (ref 0–0)
PHOSPHATE SERPL-MCNC: 4.3 MG/DL — SIGNIFICANT CHANGE UP (ref 2.5–4.5)
PLATELET # BLD AUTO: 623 K/UL — HIGH (ref 150–400)
POTASSIUM SERPL-MCNC: 4.4 MMOL/L — SIGNIFICANT CHANGE UP (ref 3.5–5.3)
POTASSIUM SERPL-SCNC: 4.4 MMOL/L — SIGNIFICANT CHANGE UP (ref 3.5–5.3)
PROT SERPL-MCNC: 6.3 G/DL — SIGNIFICANT CHANGE UP (ref 6–8.3)
RBC # BLD: 2.92 M/UL — LOW (ref 4.2–5.8)
RBC # FLD: 15.2 % — HIGH (ref 10.3–14.5)
SODIUM SERPL-SCNC: 137 MMOL/L — SIGNIFICANT CHANGE UP (ref 135–145)
WBC # BLD: 10.86 K/UL — HIGH (ref 3.8–10.5)
WBC # FLD AUTO: 10.86 K/UL — HIGH (ref 3.8–10.5)

## 2023-01-27 RX ORDER — HYDROMORPHONE HYDROCHLORIDE 2 MG/ML
1 INJECTION INTRAMUSCULAR; INTRAVENOUS; SUBCUTANEOUS ONCE
Refills: 0 | Status: DISCONTINUED | OUTPATIENT
Start: 2023-01-27 | End: 2023-01-27

## 2023-01-27 RX ORDER — OXYCODONE HYDROCHLORIDE 5 MG/1
5 TABLET ORAL ONCE
Refills: 0 | Status: DISCONTINUED | OUTPATIENT
Start: 2023-01-27 | End: 2023-01-27

## 2023-01-27 RX ADMIN — CHLORHEXIDINE GLUCONATE 1 APPLICATION(S): 213 SOLUTION TOPICAL at 11:50

## 2023-01-27 RX ADMIN — HYDROMORPHONE HYDROCHLORIDE 1 MILLIGRAM(S): 2 INJECTION INTRAMUSCULAR; INTRAVENOUS; SUBCUTANEOUS at 08:33

## 2023-01-27 RX ADMIN — MUPIROCIN 1 APPLICATION(S): 20 OINTMENT TOPICAL at 05:02

## 2023-01-27 RX ADMIN — HYDROMORPHONE HYDROCHLORIDE 1 MILLIGRAM(S): 2 INJECTION INTRAMUSCULAR; INTRAVENOUS; SUBCUTANEOUS at 23:56

## 2023-01-27 RX ADMIN — HYDROMORPHONE HYDROCHLORIDE 1 MILLIGRAM(S): 2 INJECTION INTRAMUSCULAR; INTRAVENOUS; SUBCUTANEOUS at 08:18

## 2023-01-27 RX ADMIN — OXYCODONE HYDROCHLORIDE 5 MILLIGRAM(S): 5 TABLET ORAL at 18:03

## 2023-01-27 RX ADMIN — OXYCODONE HYDROCHLORIDE 5 MILLIGRAM(S): 5 TABLET ORAL at 17:03

## 2023-01-27 RX ADMIN — PANTOPRAZOLE SODIUM 40 MILLIGRAM(S): 20 TABLET, DELAYED RELEASE ORAL at 18:22

## 2023-01-27 RX ADMIN — LIDOCAINE 1 PATCH: 4 CREAM TOPICAL at 15:38

## 2023-01-27 RX ADMIN — LIDOCAINE 1 PATCH: 4 CREAM TOPICAL at 19:00

## 2023-01-27 RX ADMIN — LEVETIRACETAM 400 MILLIGRAM(S): 250 TABLET, FILM COATED ORAL at 05:02

## 2023-01-27 RX ADMIN — LEVETIRACETAM 400 MILLIGRAM(S): 250 TABLET, FILM COATED ORAL at 18:22

## 2023-01-27 RX ADMIN — HEPARIN SODIUM 2200 UNIT(S)/HR: 5000 INJECTION INTRAVENOUS; SUBCUTANEOUS at 19:09

## 2023-01-27 RX ADMIN — OXYCODONE HYDROCHLORIDE 5 MILLIGRAM(S): 5 TABLET ORAL at 06:23

## 2023-01-27 RX ADMIN — HEPARIN SODIUM 2200 UNIT(S)/HR: 5000 INJECTION INTRAVENOUS; SUBCUTANEOUS at 07:08

## 2023-01-27 RX ADMIN — HEPARIN SODIUM 2200 UNIT(S)/HR: 5000 INJECTION INTRAVENOUS; SUBCUTANEOUS at 07:01

## 2023-01-27 RX ADMIN — LACOSAMIDE 120 MILLIGRAM(S): 50 TABLET ORAL at 05:01

## 2023-01-27 RX ADMIN — HEPARIN SODIUM 2200 UNIT(S)/HR: 5000 INJECTION INTRAVENOUS; SUBCUTANEOUS at 18:46

## 2023-01-27 RX ADMIN — LACOSAMIDE 120 MILLIGRAM(S): 50 TABLET ORAL at 17:03

## 2023-01-27 RX ADMIN — MUPIROCIN 1 APPLICATION(S): 20 OINTMENT TOPICAL at 18:30

## 2023-01-27 RX ADMIN — OXYCODONE HYDROCHLORIDE 5 MILLIGRAM(S): 5 TABLET ORAL at 05:55

## 2023-01-27 RX ADMIN — OXYCODONE HYDROCHLORIDE 5 MILLIGRAM(S): 5 TABLET ORAL at 21:33

## 2023-01-27 RX ADMIN — Medication 81 MILLIGRAM(S): at 11:50

## 2023-01-27 RX ADMIN — Medication 1 TABLET(S): at 11:51

## 2023-01-27 RX ADMIN — PANTOPRAZOLE SODIUM 40 MILLIGRAM(S): 20 TABLET, DELAYED RELEASE ORAL at 05:01

## 2023-01-27 RX ADMIN — HEPARIN SODIUM 2200 UNIT(S)/HR: 5000 INJECTION INTRAVENOUS; SUBCUTANEOUS at 05:56

## 2023-01-27 RX ADMIN — OXYCODONE HYDROCHLORIDE 5 MILLIGRAM(S): 5 TABLET ORAL at 00:30

## 2023-01-27 RX ADMIN — OXYCODONE HYDROCHLORIDE 5 MILLIGRAM(S): 5 TABLET ORAL at 22:00

## 2023-01-27 NOTE — PROGRESS NOTE ADULT - SUBJECTIVE AND OBJECTIVE BOX
Patient is a 52y old  Male who presents with a chief complaint of Altered mental status     (25 Jan 2023 14:31)    Pt seen this morning, resting comfortably. H/H stable.     MEDICATIONS  (STANDING):  aspirin enteric coated 81 milliGRAM(s) Oral daily  chlorhexidine 2% Cloths 1 Application(s) Topical daily  heparin  Infusion. 2000 Unit(s)/Hr (20 mL/Hr) IV Continuous <Continuous>  lacosamide IVPB 100 milliGRAM(s) IV Intermittent every 12 hours  levETIRAcetam  IVPB 1500 milliGRAM(s) IV Intermittent every 12 hours  lidocaine   4% Patch 1 Patch Transdermal daily  multivitamin 1 Tablet(s) Oral daily  mupirocin 2% Ointment 1 Application(s) Topical two times a day  pantoprazole  Injectable 40 milliGRAM(s) IV Push two times a day  senna 2 Tablet(s) Oral at bedtime    MEDICATIONS  (PRN):  heparin   Injectable 6500 Unit(s) IV Push every 6 hours PRN For aPTT less than 40  heparin   Injectable 3000 Unit(s) IV Push every 6 hours PRN For aPTT between 40 - 57  heparin   Injectable 6500 Unit(s) IV Push every 6 hours PRN For aPTT less than 40  heparin   Injectable 3000 Unit(s) IV Push every 6 hours PRN For aPTT between 40 - 57  oxyCODONE    IR 5 milliGRAM(s) Oral every 6 hours PRN Severe Pain (7 - 10)  polyethylene glycol 3350 17 Gram(s) Oral daily PRN Constipation        Vital Signs Last 24 Hrs  T(C): 36.5 (27 Jan 2023 11:54), Max: 36.9 (26 Jan 2023 15:06)  T(F): 97.7 (27 Jan 2023 11:54), Max: 98.4 (26 Jan 2023 15:06)  HR: 81 (27 Jan 2023 11:54) (81 - 90)  BP: 117/69 (27 Jan 2023 11:54) (109/76 - 121/71)  BP(mean): --  RR: 16 (27 Jan 2023 11:54) (16 - 18)  SpO2: 100% (27 Jan 2023 11:54) (96% - 100%)    Parameters below as of 27 Jan 2023 11:54  Patient On (Oxygen Delivery Method): room air        PE  NAD  resting comfortably   Anicteric, MMM  LLE edema   No rash grossly                            8.4    10.86 )-----------( 623      ( 27 Jan 2023 05:37 )             27.0       01-27    137  |  103  |  10  ----------------------------<  111<H>  4.4   |  27  |  0.72    Ca    8.8      27 Jan 2023 05:37  Phos  4.3     01-27  Mg     2.10     01-27    TPro  6.3  /  Alb  2.8<L>  /  TBili  0.5  /  DBili  x   /  AST  13  /  ALT  30  /  AlkPhos  124<H>  01-27

## 2023-01-27 NOTE — PROGRESS NOTE ADULT - SUBJECTIVE AND OBJECTIVE BOX
SURGERY  Pager: #[phone]    INTERVAL EVENTS/SUBJECTIVE: Overnight pt. dressings changed due to concern for saturation. H/h stable. Patient seen and examined with wound vac and plastics team at bedside.     ______________________________________________  OBJECTIVE:   T(C): 36.7 (01-27-23 @ 07:23), Max: 36.9 (01-26-23 @ 15:06)  HR: 81 (01-27-23 @ 07:23) (80 - 90)  BP: 112/70 (01-27-23 @ 07:23) (100/66 - 121/71)  RR: 16 (01-27-23 @ 07:23) (16 - 18)  SpO2: 98% (01-27-23 @ 07:23) (96% - 100%)  Wt(kg): --  CAPILLARY BLOOD GLUCOSE        I&O's Detail      Physical exam:  Gen: resting in bed comfortably in NAD  Chest: no increased WOB, regular inspiratory effort   Abdomen: Soft, nontender, nondistended with no rebound tenderness or guarding.   Ext: LLE with ooze from wounds, tissue underneath viable   Neuro: awake, alert  ______________________________________________  LABS:  CBC Full  -  ( 27 Jan 2023 05:37 )  WBC Count : 10.86 K/uL  RBC Count : 2.92 M/uL  Hemoglobin : 8.4 g/dL  Hematocrit : 27.0 %  Platelet Count - Automated : 623 K/uL  Mean Cell Volume : 92.5 fL  Mean Cell Hemoglobin : 28.8 pg  Mean Cell Hemoglobin Concentration : 31.1 gm/dL  Auto Neutrophil # : x  Auto Lymphocyte # : x  Auto Monocyte # : x  Auto Eosinophil # : x  Auto Basophil # : x  Auto Neutrophil % : x  Auto Lymphocyte % : x  Auto Monocyte % : x  Auto Eosinophil % : x  Auto Basophil % : x    01-27    137  |  103  |  10  ----------------------------<  111<H>  4.4   |  27  |  0.72    Ca    8.8      27 Jan 2023 05:37  Phos  4.3     01-27  Mg     2.10     01-27    TPro  6.3  /  Alb  2.8<L>  /  TBili  0.5  /  DBili  x   /  AST  13  /  ALT  30  /  AlkPhos  124<H>  01-27    _____________________________________________  RADIOLOGY:

## 2023-01-27 NOTE — PROGRESS NOTE ADULT - ASSESSMENT
This is a 52 year old male who presents with altered mental status.      Anemia   -- Hg has been stable.  -- Occult blood negative. Labs show some iron deficiency but elevated ferritin. No evidence of B12/folate deficiency or hemolysis.   -- GI consulted, low suspicion for GI bleed. Pt had colonoscopy last admission w/o bleeding. Holding off endoscopic intervention  -- CT a/p negative for RP bleed   -- SPEP shows hypoalbuminemia   -- Transfuse to maintain hg >7     r/o malignancy   -- Prior admission had MRI with abnormal findings, possible malignancy. LP w/o malignant cells.  -- Colonoscopy with biopsy of polyp lesion showing tubular adenoma. Pelvic LN biopsy requested however risks outweighed benefits.   -- CEA, , AFP wnl. PSA mildly elevated at 5.24.  -- Outpatient follow-up to discuss need for PET/CT   -- consider neurosurgery eval for biopsy     DVT   -- US LE shows new acute LLE DVT compared to recent imaging on 12/31 despite pt on Lovenox, though unclear if he was compliant with anticoagulation   -- AT III, protein C/S, Lupus profile negative, beta 2, aCL ab neg. Factor V Leiden, prothrombin gene mt also negative.   -- Cont AC   -- s/p emergent fasciotomy for compartment syndrome 01/18, s/p debridement with washout 01/23  -- vascular following      AMS resolved   -- EEG done, no seizure activity  -- MRI w/o any new significant findings     Will continue to follow.    Caro Dow PA-C  Hematology/Oncology  [facility]  [phone] (office)  [phone] (alt office)  Evenings and weekends please call MD on call or office

## 2023-01-27 NOTE — PROGRESS NOTE ADULT - NS ATTEND OPT1 GEN_ALL_CORE
I attest my time as attending is greater than 50% of the total combined time spent on qualifying patient care activities by the PA/NP and attending.
I attest my time as attending is greater than 50% of the total combined time spent on qualifying patient care activities by the PA/NP and attending.
I independently performed the documented:

## 2023-01-27 NOTE — PROGRESS NOTE ADULT - NS ATTEND AMEND GEN_ALL_CORE FT
53 y/o male admitted with anemia, altered mental status, with compartment syndrome s/p fasciotomy 1/18, s/p wash out on 1/23.      Acute DVT left popliteal vein, left posterior tibial vein, and left gastrocnemius vein, provoked due to recent immobility/compartment syndrome.    Hypercoagulable work up negative to date.    Tolerating anticoagulation with IV heparin, his Hg is stable.   Repeat MRI imaging with areas of contrast enhancement in the bilateral lentiform nucleus, thalami and alexis appear less conspicuous / improved compared with prior study. There is minimal involvement of the upper cervical cord.  CSF imaging was negative.  Neurology following, to decide upon empiric steroids.

## 2023-01-27 NOTE — ADVANCED PRACTICE NURSE CONSULT - ASSESSMENT
Upon assessment, approximately 50ml of sanguinous drainage in canister. Dressing dry and intact no strikethrough noted since dressing changed from day prior. Plastic surgery resident Bridger Wayne at bedside for wound assessment. After discussion- decision made to continue NPWT to medial lower leg and to apply wet to dry to left lateral lower leg and to continue to monitor oozing.     Left lateral lower leg- see A&I flowsheet for full assessment details. Dressed with silicone contact layer, gauze, abdominal pad, kerlix and ace wrap.     Left medial lower leg- NPWT reapplied.

## 2023-01-27 NOTE — PROGRESS NOTE ADULT - NS ATTEND OPT1A GEN_ALL_CORE
Medical decision making
History/Exam/Medical decision making
History/Exam/Medical decision making

## 2023-01-27 NOTE — PROGRESS NOTE ADULT - ASSESSMENT
52M w hx of multiple CVA in last year with residual deficit of word finding difficulty, recent admission for unresponsiveness [facility] seizure activity @ OSH, who presents with AMS today from nursing facility, found to have acute onset LLE DVT L pop v, L PT v, and L gastroc v cb compartment syndrome of LLE. S/p left 4 quadrant decompression fasciotomy, RTOR for additional washout and re-evaluation of muscle viability .    Plan:  - OK for full AC from vascular surgery perspective  - Appreciate wound vac management per vac team   - Plastics team following for interval closure of fasciotomy sites     C Team Surgery  #[id number]

## 2023-01-27 NOTE — PROGRESS NOTE ADULT - SUBJECTIVE AND OBJECTIVE BOX
Name of Patient : TAMI DUNHAM  MRN: [id number]  Date of visit: 01-27-23     Subjective: Patient seen and examined. No new events except as noted.   Doing okay   wound vac intact     REVIEW OF SYSTEMS:    CONSTITUTIONAL: No weakness, fevers or chills  EYES/ENT: No visual changes;  No vertigo or throat pain   NECK: No pain or stiffness  RESPIRATORY: No cough, wheezing, hemoptysis; No shortness of breath  CARDIOVASCULAR: No chest pain or palpitations  GASTROINTESTINAL: No abdominal or epigastric pain. No nausea, vomiting, or hematemesis; No diarrhea or constipation. No melena or hematochezia.  GENITOURINARY: No dysuria, frequency or hematuria  NEUROLOGICAL: No numbness or weakness  SKIN: No itching, burning, rashes, or lesions   All other review of systems is negative unless indicated above.    MEDICATIONS:  MEDICATIONS  (STANDING):  aspirin enteric coated 81 milliGRAM(s) Oral daily  chlorhexidine 2% Cloths 1 Application(s) Topical daily  heparin  Infusion. 2000 Unit(s)/Hr (20 mL/Hr) IV Continuous <Continuous>  lacosamide IVPB 100 milliGRAM(s) IV Intermittent every 12 hours  levETIRAcetam  IVPB 1500 milliGRAM(s) IV Intermittent every 12 hours  lidocaine   4% Patch 1 Patch Transdermal daily  multivitamin 1 Tablet(s) Oral daily  mupirocin 2% Ointment 1 Application(s) Topical two times a day  pantoprazole  Injectable 40 milliGRAM(s) IV Push two times a day  senna 2 Tablet(s) Oral at bedtime      PHYSICAL EXAM:  T(C): 37 (01-27-23 @ 16:59), Max: 37 (01-27-23 @ 16:59)  HR: 88 (01-27-23 @ 16:59) (81 - 90)  BP: 118/70 (01-27-23 @ 16:59) (109/76 - 119/76)  RR: 17 (01-27-23 @ 16:59) (16 - 18)  SpO2: 99% (01-27-23 @ 16:59) (97% - 100%)  Wt(kg): --  I&O's Summary        Appearance: Normal	  HEENT:  PERRLA   Lymphatic: No lymphadenopathy   Cardiovascular: Normal S1 S2, no JVD  Respiratory: normal effort , clear  Gastrointestinal:  Soft, Non-tender  Skin: No rashes,  warm to touch  Psychiatry:  Mood & affect appropriate  Musculuskeletal: LE dressing     recent labs, Imaging and EKGs personally reviewed                           8.4    10.86 )-----------( 623      ( 27 Jan 2023 05:37 )             27.0               01-27    137  |  103  |  10  ----------------------------<  111<H>  4.4   |  27  |  0.72    Ca    8.8      27 Jan 2023 05:37  Phos  4.3     01-27  Mg     2.10     01-27    TPro  6.3  /  Alb  2.8<L>  /  TBili  0.5  /  DBili  x   /  AST  13  /  ALT  30  /  AlkPhos  124<H>  01-27    PTT - ( 27 Jan 2023 05:37 )  PTT:67.3 sec

## 2023-01-27 NOTE — PROGRESS NOTE ADULT - ASSESSMENT
Patient is a 52-year-old male with a past medical history of CVA/TIA, ASD/PFO?, hyperlipidemia presents emerged department today with altered mental status from his nursing facility.  Patient just had a stay in this hospital when he was found unresponsive at home.  An extensive work-up with no significant etiology found.  There were thoughts that it was related to seizure activity however neurology did not find any seizure activity on their work-up.  Patient went to a nursing facility and rehab.  Was doing well until yesterday his parents noted him to be somewhat confused and having word finding difficulties.  Today his speech was worse when noticed by the wife.  He is also noted to have odd behavior where he was pulling at sheets and the strings of the masks.  He did not have this behavior before.  Only residual deficit from his previous strokes with some random word finding abilities however his speech today is reported to be significantly worse with worse word finding.  The patient denies any pain at this time.  He does acknowledge that he is having trouble speaking.  There is also report from the family and patient is complaining of left calf pain.  This pain is much more severe than it has been in the past.    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, SP LP, no malignancy on cyto   tele monitoring   brain MRI per Neuro recs, noted, follow up neuro recs , discussed with neurosurg, no plan for biopsy at this time     # Lekucytosis  Pan CX   Infectious W/U   CTA negative   ID eval appreciated    Hematology eval appreciated, follow up recs     #  Compartment syndrome   S/P Fasciotomy   Vascular follow up appreciated   dressing per vascular follow up recs   ID eval appreciated, cont zosyn, total of 7 days   as per vascualr, no need for BKLA>alive muscle, wound care  plastic surg eval called for possible flap , appreciated recs, wound vac care   resume AC         # Elevated LFTs  trending up  hold tyleniol and statin  trend for now  if cont to trend up, will obtain US   likely due to acute anemia       # ANemia  Occult negative  Total of 3 units PRBC  serial CBC   GI follow up appreciated

## 2023-01-28 LAB
ANION GAP SERPL CALC-SCNC: 6 MMOL/L — LOW (ref 7–14)
APTT BLD: 108 SEC — HIGH (ref 27–36.3)
BUN SERPL-MCNC: 9 MG/DL — SIGNIFICANT CHANGE UP (ref 7–23)
CALCIUM SERPL-MCNC: 9.3 MG/DL — SIGNIFICANT CHANGE UP (ref 8.4–10.5)
CHLORIDE SERPL-SCNC: 102 MMOL/L — SIGNIFICANT CHANGE UP (ref 98–107)
CO2 SERPL-SCNC: 29 MMOL/L — SIGNIFICANT CHANGE UP (ref 22–31)
CREAT SERPL-MCNC: 0.74 MG/DL — SIGNIFICANT CHANGE UP (ref 0.5–1.3)
EGFR: 109 ML/MIN/1.73M2 — SIGNIFICANT CHANGE UP
GLUCOSE SERPL-MCNC: 100 MG/DL — HIGH (ref 70–99)
HCT VFR BLD CALC: 29 % — LOW (ref 39–50)
HGB BLD-MCNC: 8.9 G/DL — LOW (ref 13–17)
MAGNESIUM SERPL-MCNC: 2.2 MG/DL — SIGNIFICANT CHANGE UP (ref 1.6–2.6)
MCHC RBC-ENTMCNC: 28.9 PG — SIGNIFICANT CHANGE UP (ref 27–34)
MCHC RBC-ENTMCNC: 30.7 GM/DL — LOW (ref 32–36)
MCV RBC AUTO: 94.2 FL — SIGNIFICANT CHANGE UP (ref 80–100)
NRBC # BLD: 0 /100 WBCS — SIGNIFICANT CHANGE UP (ref 0–0)
NRBC # FLD: 0 K/UL — SIGNIFICANT CHANGE UP (ref 0–0)
PHOSPHATE SERPL-MCNC: 4.6 MG/DL — HIGH (ref 2.5–4.5)
PLATELET # BLD AUTO: 683 K/UL — HIGH (ref 150–400)
POTASSIUM SERPL-MCNC: 4.2 MMOL/L — SIGNIFICANT CHANGE UP (ref 3.5–5.3)
POTASSIUM SERPL-SCNC: 4.2 MMOL/L — SIGNIFICANT CHANGE UP (ref 3.5–5.3)
RBC # BLD: 3.08 M/UL — LOW (ref 4.2–5.8)
RBC # FLD: 15.2 % — HIGH (ref 10.3–14.5)
SODIUM SERPL-SCNC: 137 MMOL/L — SIGNIFICANT CHANGE UP (ref 135–145)
WBC # BLD: 12.39 K/UL — HIGH (ref 3.8–10.5)
WBC # FLD AUTO: 12.39 K/UL — HIGH (ref 3.8–10.5)

## 2023-01-28 RX ORDER — ENOXAPARIN SODIUM 100 MG/ML
80 INJECTION SUBCUTANEOUS EVERY 12 HOURS
Refills: 0 | Status: DISCONTINUED | OUTPATIENT
Start: 2023-01-28 | End: 2023-02-06

## 2023-01-28 RX ADMIN — LIDOCAINE 1 PATCH: 4 CREAM TOPICAL at 03:26

## 2023-01-28 RX ADMIN — Medication 1 TABLET(S): at 10:39

## 2023-01-28 RX ADMIN — OXYCODONE HYDROCHLORIDE 5 MILLIGRAM(S): 5 TABLET ORAL at 11:10

## 2023-01-28 RX ADMIN — MUPIROCIN 1 APPLICATION(S): 20 OINTMENT TOPICAL at 05:01

## 2023-01-28 RX ADMIN — LACOSAMIDE 120 MILLIGRAM(S): 50 TABLET ORAL at 17:04

## 2023-01-28 RX ADMIN — LACOSAMIDE 120 MILLIGRAM(S): 50 TABLET ORAL at 05:12

## 2023-01-28 RX ADMIN — SENNA PLUS 2 TABLET(S): 8.6 TABLET ORAL at 21:19

## 2023-01-28 RX ADMIN — OXYCODONE HYDROCHLORIDE 5 MILLIGRAM(S): 5 TABLET ORAL at 17:02

## 2023-01-28 RX ADMIN — LEVETIRACETAM 400 MILLIGRAM(S): 250 TABLET, FILM COATED ORAL at 17:04

## 2023-01-28 RX ADMIN — PANTOPRAZOLE SODIUM 40 MILLIGRAM(S): 20 TABLET, DELAYED RELEASE ORAL at 17:04

## 2023-01-28 RX ADMIN — HEPARIN SODIUM 2000 UNIT(S)/HR: 5000 INJECTION INTRAVENOUS; SUBCUTANEOUS at 07:22

## 2023-01-28 RX ADMIN — LEVETIRACETAM 400 MILLIGRAM(S): 250 TABLET, FILM COATED ORAL at 05:01

## 2023-01-28 RX ADMIN — MUPIROCIN 1 APPLICATION(S): 20 OINTMENT TOPICAL at 17:14

## 2023-01-28 RX ADMIN — HYDROMORPHONE HYDROCHLORIDE 1 MILLIGRAM(S): 2 INJECTION INTRAMUSCULAR; INTRAVENOUS; SUBCUTANEOUS at 00:14

## 2023-01-28 RX ADMIN — PANTOPRAZOLE SODIUM 40 MILLIGRAM(S): 20 TABLET, DELAYED RELEASE ORAL at 05:01

## 2023-01-28 RX ADMIN — CHLORHEXIDINE GLUCONATE 1 APPLICATION(S): 213 SOLUTION TOPICAL at 10:39

## 2023-01-28 RX ADMIN — HEPARIN SODIUM 2200 UNIT(S)/HR: 5000 INJECTION INTRAVENOUS; SUBCUTANEOUS at 06:05

## 2023-01-28 RX ADMIN — OXYCODONE HYDROCHLORIDE 5 MILLIGRAM(S): 5 TABLET ORAL at 10:37

## 2023-01-28 RX ADMIN — ENOXAPARIN SODIUM 80 MILLIGRAM(S): 100 INJECTION SUBCUTANEOUS at 17:04

## 2023-01-28 RX ADMIN — Medication 81 MILLIGRAM(S): at 10:37

## 2023-01-28 RX ADMIN — OXYCODONE HYDROCHLORIDE 5 MILLIGRAM(S): 5 TABLET ORAL at 17:30

## 2023-01-28 NOTE — PROGRESS NOTE ADULT - SUBJECTIVE AND OBJECTIVE BOX
SURGERY  Pager: #[phone]    INTERVAL EVENTS/SUBJECTIVE: Patient seen and examined at bedside Vac in place under dressing without any bleeding or saturation.     ______________________________________________  OBJECTIVE:   ICU Vital Signs Last 24 Hrs  T(C): 36.4 (28 Jan 2023 10:30), Max: 37 (27 Jan 2023 21:23)  T(F): 97.5 (28 Jan 2023 10:30), Max: 98.6 (27 Jan 2023 21:23)  HR: 83 (28 Jan 2023 17:00) (73 - 96)  BP: 106/68 (28 Jan 2023 17:00) (106/68 - 123/82)  BP(mean): --  ABP: --  ABP(mean): --  RR: 18 (28 Jan 2023 17:00) (18 - 18)  SpO2: 96% (28 Jan 2023 17:00) (96% - 99%)    O2 Parameters below as of 28 Jan 2023 17:00  Patient On (Oxygen Delivery Method): room air        I&O's Detail      Physical exam:  Gen: resting in bed comfortably in NAD  Chest: no increased WOB, regular inspiratory effort   Abdomen: Soft, nontender, nondistended with no rebound tenderness or guarding.   Ext: LLE with ooze from wounds, tissue underneath viable   Neuro: awake, alert  ______________________________________________  LABS:  CBC Full  -  ( 27 Jan 2023 05:37 )  WBC Count : 10.86 K/uL  RBC Count : 2.92 M/uL  Hemoglobin : 8.4 g/dL  Hematocrit : 27.0 %  Platelet Count - Automated : 623 K/uL  Mean Cell Volume : 92.5 fL  Mean Cell Hemoglobin : 28.8 pg  Mean Cell Hemoglobin Concentration : 31.1 gm/dL  Auto Neutrophil # : x  Auto Lymphocyte # : x  Auto Monocyte # : x  Auto Eosinophil # : x  Auto Basophil # : x  Auto Neutrophil % : x  Auto Lymphocyte % : x  Auto Monocyte % : x  Auto Eosinophil % : x  Auto Basophil % : x    01-27    137  |  103  |  10  ----------------------------<  111<H>  4.4   |  27  |  0.72    Ca    8.8      27 Jan 2023 05:37  Phos  4.3     01-27  Mg     2.10     01-27    TPro  6.3  /  Alb  2.8<L>  /  TBili  0.5  /  DBili  x   /  AST  13  /  ALT  30  /  AlkPhos  124<H>  01-27    _____________________________________________  RADIOLOGY:

## 2023-01-28 NOTE — PHYSICAL THERAPY INITIAL EVALUATION ADULT - LEVEL OF INDEPENDENCE: GAIT, REHAB EVAL
Pt with decreased sensation to LLE, deferred further ambulation at this time due to general weakness/unable to perform

## 2023-01-28 NOTE — PROGRESS NOTE ADULT - ASSESSMENT
52M w hx of multiple CVA in last year with residual deficit of word finding difficulty, recent admission for unresponsiveness Counts include 234 beds at the [facility] seizure activity @ OSH, who presents with AMS today from nursing facility, found to have acute onset LLE DVT L pop v, L PT v, and L gastroc v cb compartment syndrome of LLE. S/p left 4 quadrant decompression fasciotomy, RTOR for additional washout and re-evaluation of muscle viability .    Plan:  - OK for full AC from vascular surgery perspective  - Appreciate wound vac management per vac team   - Plastics team following for interval closure of fasciotomy sites     C Team Surgery  #[id number]

## 2023-01-28 NOTE — PHYSICAL THERAPY INITIAL EVALUATION ADULT - ADDITIONAL COMMENTS
Pt states he lives in an apartment alone with 10 steps to negotiate with bilateral close handrails. Prior to admission, pt was independent with ADLs and ambulated without any ADs.   Post PT evaluation, pt left semi-supine, alarm on, call bell and remote within reach, all precautions maintained, NAD. MARK guerra.

## 2023-01-28 NOTE — PROGRESS NOTE ADULT - SUBJECTIVE AND OBJECTIVE BOX
Name of Patient : TAMI DUNHAM  MRN: [id number]  Date of visit: 01-28-23       Subjective: Patient seen and examined. No new events except as noted.   doing okay     REVIEW OF SYSTEMS:    CONSTITUTIONAL: No weakness, fevers or chills  EYES/ENT: No visual changes;  No vertigo or throat pain   NECK: No pain or stiffness  RESPIRATORY: No cough, wheezing, hemoptysis; No shortness of breath  CARDIOVASCULAR: No chest pain or palpitations  GASTROINTESTINAL: No abdominal or epigastric pain. No nausea, vomiting, or hematemesis; No diarrhea or constipation. No melena or hematochezia.  GENITOURINARY: No dysuria, frequency or hematuria  NEUROLOGICAL: No numbness or weakness  SKIN: No itching, burning, rashes, or lesions   All other review of systems is negative unless indicated above.    MEDICATIONS:  MEDICATIONS  (STANDING):  aspirin enteric coated 81 milliGRAM(s) Oral daily  chlorhexidine 2% Cloths 1 Application(s) Topical daily  enoxaparin Injectable 80 milliGRAM(s) SubCutaneous every 12 hours  lacosamide IVPB 100 milliGRAM(s) IV Intermittent every 12 hours  levETIRAcetam  IVPB 1500 milliGRAM(s) IV Intermittent every 12 hours  lidocaine   4% Patch 1 Patch Transdermal daily  multivitamin 1 Tablet(s) Oral daily  mupirocin 2% Ointment 1 Application(s) Topical two times a day  pantoprazole  Injectable 40 milliGRAM(s) IV Push two times a day  senna 2 Tablet(s) Oral at bedtime      PHYSICAL EXAM:  T(C): 36.4 (01-28-23 @ 10:30), Max: 37 (01-27-23 @ 21:23)  HR: 83 (01-28-23 @ 17:00) (73 - 96)  BP: 106/68 (01-28-23 @ 17:00) (106/68 - 123/82)  RR: 18 (01-28-23 @ 17:00) (18 - 18)  SpO2: 96% (01-28-23 @ 17:00) (96% - 99%)  Wt(kg): --  I&O's Summary        Appearance: Normal	  HEENT:  PERRLA   Lymphatic: No lymphadenopathy   Cardiovascular: Normal S1 S2, no JVD  Respiratory: normal effort , clear  Gastrointestinal:  Soft, Non-tender  Skin: No rashes,  warm to touch  Psychiatry:  Mood & affect appropriate  Musculuskeletal: No edema    recent labs, Imaging and EKGs personally reviewed                           8.9    12.39 )-----------( 683      ( 28 Jan 2023 05:58 )             29.0               01-28    137  |  102  |  9   ----------------------------<  100<H>  4.2   |  29  |  0.74    Ca    9.3      28 Jan 2023 05:58  Phos  4.6     01-28  Mg     2.20     01-28    TPro  6.3  /  Alb  2.8<L>  /  TBili  0.5  /  DBili  x   /  AST  13  /  ALT  30  /  AlkPhos  124<H>  01-27    PTT - ( 28 Jan 2023 05:58 )  PTT:108.0 sec

## 2023-01-28 NOTE — PHYSICAL THERAPY INITIAL EVALUATION ADULT - NSPTDISCHREC_GEN_A_CORE
Rehab facility to address current functional limitation to optimize safety to allow pt. to reach their optimal level of function.

## 2023-01-28 NOTE — PHYSICAL THERAPY INITIAL EVALUATION ADULT - PERTINENT HX OF CURRENT PROBLEM, REHAB EVAL
Patient is a 52-year-old male with a past medical history of CVA/TIA, ASD/PFO?, hyperlipidemia presents emerged department today with altered mental status from his nursing facility.  Patient just had a stay in this hospital when he was found unresponsive at home.  An extensive work-up with no significant etiology found.  There were thoughts that it was related to seizure activity however neurology did not find any seizure activity on their work-up.  Patient went to a nursing facility and rehab.  Was doing well until yesterday his parents noted him to be somewhat confused and having word finding difficulties.    There is also report from the family and patient is complaining of left calf pain.  This pain is much more severe than it has been in the past.

## 2023-01-29 LAB
ALBUMIN SERPL ELPH-MCNC: 3.2 G/DL — LOW (ref 3.3–5)
ALP SERPL-CCNC: 116 U/L — SIGNIFICANT CHANGE UP (ref 40–120)
ALT FLD-CCNC: 23 U/L — SIGNIFICANT CHANGE UP (ref 4–41)
ANION GAP SERPL CALC-SCNC: 8 MMOL/L — SIGNIFICANT CHANGE UP (ref 7–14)
AST SERPL-CCNC: 12 U/L — SIGNIFICANT CHANGE UP (ref 4–40)
BILIRUB SERPL-MCNC: 0.4 MG/DL — SIGNIFICANT CHANGE UP (ref 0.2–1.2)
BLD GP AB SCN SERPL QL: NEGATIVE — SIGNIFICANT CHANGE UP
BUN SERPL-MCNC: 10 MG/DL — SIGNIFICANT CHANGE UP (ref 7–23)
CALCIUM SERPL-MCNC: 9.1 MG/DL — SIGNIFICANT CHANGE UP (ref 8.4–10.5)
CHLORIDE SERPL-SCNC: 99 MMOL/L — SIGNIFICANT CHANGE UP (ref 98–107)
CO2 SERPL-SCNC: 27 MMOL/L — SIGNIFICANT CHANGE UP (ref 22–31)
CREAT SERPL-MCNC: 0.74 MG/DL — SIGNIFICANT CHANGE UP (ref 0.5–1.3)
EGFR: 109 ML/MIN/1.73M2 — SIGNIFICANT CHANGE UP
GLUCOSE SERPL-MCNC: 103 MG/DL — HIGH (ref 70–99)
HCT VFR BLD CALC: 29.5 % — LOW (ref 39–50)
HGB BLD-MCNC: 9.5 G/DL — LOW (ref 13–17)
MAGNESIUM SERPL-MCNC: 2 MG/DL — SIGNIFICANT CHANGE UP (ref 1.6–2.6)
MCHC RBC-ENTMCNC: 29.3 PG — SIGNIFICANT CHANGE UP (ref 27–34)
MCHC RBC-ENTMCNC: 32.2 GM/DL — SIGNIFICANT CHANGE UP (ref 32–36)
MCV RBC AUTO: 91 FL — SIGNIFICANT CHANGE UP (ref 80–100)
NRBC # BLD: 0 /100 WBCS — SIGNIFICANT CHANGE UP (ref 0–0)
NRBC # FLD: 0 K/UL — SIGNIFICANT CHANGE UP (ref 0–0)
PHOSPHATE SERPL-MCNC: 4.7 MG/DL — HIGH (ref 2.5–4.5)
PLATELET # BLD AUTO: 728 K/UL — HIGH (ref 150–400)
POTASSIUM SERPL-MCNC: 4 MMOL/L — SIGNIFICANT CHANGE UP (ref 3.5–5.3)
POTASSIUM SERPL-SCNC: 4 MMOL/L — SIGNIFICANT CHANGE UP (ref 3.5–5.3)
PROT SERPL-MCNC: 6.8 G/DL — SIGNIFICANT CHANGE UP (ref 6–8.3)
RBC # BLD: 3.24 M/UL — LOW (ref 4.2–5.8)
RBC # FLD: 15.1 % — HIGH (ref 10.3–14.5)
RH IG SCN BLD-IMP: NEGATIVE — SIGNIFICANT CHANGE UP
SODIUM SERPL-SCNC: 134 MMOL/L — LOW (ref 135–145)
WBC # BLD: 11.54 K/UL — HIGH (ref 3.8–10.5)
WBC # FLD AUTO: 11.54 K/UL — HIGH (ref 3.8–10.5)

## 2023-01-29 RX ORDER — HYDROMORPHONE HYDROCHLORIDE 2 MG/ML
1 INJECTION INTRAMUSCULAR; INTRAVENOUS; SUBCUTANEOUS EVERY 6 HOURS
Refills: 0 | Status: DISCONTINUED | OUTPATIENT
Start: 2023-01-29 | End: 2023-02-04

## 2023-01-29 RX ADMIN — LACOSAMIDE 120 MILLIGRAM(S): 50 TABLET ORAL at 17:02

## 2023-01-29 RX ADMIN — HYDROMORPHONE HYDROCHLORIDE 1 MILLIGRAM(S): 2 INJECTION INTRAMUSCULAR; INTRAVENOUS; SUBCUTANEOUS at 17:47

## 2023-01-29 RX ADMIN — LEVETIRACETAM 400 MILLIGRAM(S): 250 TABLET, FILM COATED ORAL at 05:01

## 2023-01-29 RX ADMIN — OXYCODONE HYDROCHLORIDE 5 MILLIGRAM(S): 5 TABLET ORAL at 11:44

## 2023-01-29 RX ADMIN — OXYCODONE HYDROCHLORIDE 5 MILLIGRAM(S): 5 TABLET ORAL at 12:15

## 2023-01-29 RX ADMIN — ENOXAPARIN SODIUM 80 MILLIGRAM(S): 100 INJECTION SUBCUTANEOUS at 17:03

## 2023-01-29 RX ADMIN — Medication 81 MILLIGRAM(S): at 11:44

## 2023-01-29 RX ADMIN — PANTOPRAZOLE SODIUM 40 MILLIGRAM(S): 20 TABLET, DELAYED RELEASE ORAL at 17:02

## 2023-01-29 RX ADMIN — CHLORHEXIDINE GLUCONATE 1 APPLICATION(S): 213 SOLUTION TOPICAL at 11:47

## 2023-01-29 RX ADMIN — ENOXAPARIN SODIUM 80 MILLIGRAM(S): 100 INJECTION SUBCUTANEOUS at 05:01

## 2023-01-29 RX ADMIN — LEVETIRACETAM 400 MILLIGRAM(S): 250 TABLET, FILM COATED ORAL at 18:14

## 2023-01-29 RX ADMIN — SENNA PLUS 2 TABLET(S): 8.6 TABLET ORAL at 21:11

## 2023-01-29 RX ADMIN — PANTOPRAZOLE SODIUM 40 MILLIGRAM(S): 20 TABLET, DELAYED RELEASE ORAL at 05:01

## 2023-01-29 RX ADMIN — HYDROMORPHONE HYDROCHLORIDE 1 MILLIGRAM(S): 2 INJECTION INTRAMUSCULAR; INTRAVENOUS; SUBCUTANEOUS at 17:32

## 2023-01-29 RX ADMIN — MUPIROCIN 1 APPLICATION(S): 20 OINTMENT TOPICAL at 17:03

## 2023-01-29 RX ADMIN — LACOSAMIDE 120 MILLIGRAM(S): 50 TABLET ORAL at 05:24

## 2023-01-29 RX ADMIN — LIDOCAINE 1 PATCH: 4 CREAM TOPICAL at 11:48

## 2023-01-29 RX ADMIN — MUPIROCIN 1 APPLICATION(S): 20 OINTMENT TOPICAL at 05:02

## 2023-01-29 RX ADMIN — OXYCODONE HYDROCHLORIDE 5 MILLIGRAM(S): 5 TABLET ORAL at 05:07

## 2023-01-29 RX ADMIN — OXYCODONE HYDROCHLORIDE 5 MILLIGRAM(S): 5 TABLET ORAL at 05:47

## 2023-01-29 RX ADMIN — Medication 1 TABLET(S): at 11:44

## 2023-01-29 NOTE — PROGRESS NOTE ADULT - ASSESSMENT
This is a 52 year old male who presents with altered mental status.      Anemia   -- Hg has been stable.  -- Occult blood negative.   Counts improving.   -- GI consulted, low suspicion for GI bleed. Pt had colonoscopy last admission w/o bleeding. Holding off endoscopic intervention  -- CT a/p negative for RP bleed   -- SPEP shows hypoalbuminemia   -- Transfuse to maintain hg >7     r/o malignancy   -- Prior admission had MRI with abnormal findings, possible malignancy. LP w/o malignant cells.  -- Colonoscopy with biopsy of polyp lesion showing tubular adenoma. Pelvic LN biopsy requested however risks outweighed benefits.   -- CEA, , AFP wnl. PSA mildly elevated at 5.24.  -- Outpatient follow-up to discuss need for PET/CT   -- consider neurosurgery eval for biopsy     DVT   -- US LE shows new acute LLE DVT compared to recent imaging on 12/31 despite pt on Lovenox, though unclear if he was compliant with anticoagulation   -- AT III, protein C/S, Lupus profile negative, beta 2, aCL ab neg. Factor V Leiden, prothrombin gene mt also negative.   --full dose anticoagulation  -- s/p emergent fasciotomy for compartment syndrome 01/18, s/p debridement with washout 01/23, Plastics team following for interval closure of fasciotomy sites  -- vascular recs appreciated     AMS  -- resolved ,no seizures on EEG  -- MRI w/o any new significant findings     Will continue to follow.    Nataly Sen NP  Hematology/Oncology  [facility]  [phone] (Office)  [phone] (Alt office)  Evenings and weekends please call MD on call or office

## 2023-01-29 NOTE — PROGRESS NOTE ADULT - SUBJECTIVE AND OBJECTIVE BOX
Name of Patient : TAMI DUNHAM  MRN: [id number]  Date of visit: 01-29-23       Subjective: Patient seen and examined. No new events except as noted.     REVIEW OF SYSTEMS:    CONSTITUTIONAL: No weakness, fevers or chills  EYES/ENT: No visual changes;  No vertigo or throat pain   NECK: No pain or stiffness  RESPIRATORY: No cough, wheezing, hemoptysis; No shortness of breath  CARDIOVASCULAR: No chest pain or palpitations  GASTROINTESTINAL: No abdominal or epigastric pain. No nausea, vomiting, or hematemesis; No diarrhea or constipation. No melena or hematochezia.  GENITOURINARY: No dysuria, frequency or hematuria  NEUROLOGICAL: No numbness or weakness  SKIN: No itching, burning, rashes, or lesions   All other review of systems is negative unless indicated above.    MEDICATIONS:  MEDICATIONS  (STANDING):  aspirin enteric coated 81 milliGRAM(s) Oral daily  chlorhexidine 2% Cloths 1 Application(s) Topical daily  enoxaparin Injectable 80 milliGRAM(s) SubCutaneous every 12 hours  lacosamide IVPB 100 milliGRAM(s) IV Intermittent every 12 hours  levETIRAcetam  IVPB 1500 milliGRAM(s) IV Intermittent every 12 hours  lidocaine   4% Patch 1 Patch Transdermal daily  multivitamin 1 Tablet(s) Oral daily  mupirocin 2% Ointment 1 Application(s) Topical two times a day  pantoprazole  Injectable 40 milliGRAM(s) IV Push two times a day  senna 2 Tablet(s) Oral at bedtime      PHYSICAL EXAM:  T(C): 36.2 (01-29-23 @ 11:45), Max: 37.1 (01-29-23 @ 05:07)  HR: 89 (01-29-23 @ 11:45) (85 - 95)  BP: 111/74 (01-29-23 @ 11:45) (106/69 - 119/80)  RR: 18 (01-29-23 @ 11:45) (18 - 18)  SpO2: 97% (01-29-23 @ 11:45) (97% - 100%)  Wt(kg): --  I&O's Summary        Appearance: Normal	  HEENT:  PERRLA   Lymphatic: No lymphadenopathy   Cardiovascular: Normal S1 S2, no JVD  Respiratory: normal effort , clear  Gastrointestinal:  Soft, Non-tender  Skin: No rashes,  warm to touch  Psychiatry:  Mood & affect appropriate  Musculuskeletal: No edema    recent labs, Imaging and EKGs personally reviewed                           9.5    11.54 )-----------( 728      ( 29 Jan 2023 05:25 )             29.5               01-29    134<L>  |  99  |  10  ----------------------------<  103<H>  4.0   |  27  |  0.74    Ca    9.1      29 Jan 2023 05:25  Phos  4.7     01-29  Mg     2.00     01-29    TPro  6.8  /  Alb  3.2<L>  /  TBili  0.4  /  DBili  x   /  AST  12  /  ALT  23  /  AlkPhos  116  01-29    PTT - ( 28 Jan 2023 05:58 )  PTT:108.0 sec

## 2023-01-29 NOTE — PROGRESS NOTE ADULT - SUBJECTIVE AND OBJECTIVE BOX
Patient is a 52y old  Male who presents with a chief complaint of Altered mental status     (25 Jan 2023 14:31)      MEDICATIONS  (STANDING):  aspirin enteric coated 81 milliGRAM(s) Oral daily  chlorhexidine 2% Cloths 1 Application(s) Topical daily  enoxaparin Injectable 80 milliGRAM(s) SubCutaneous every 12 hours  lacosamide IVPB 100 milliGRAM(s) IV Intermittent every 12 hours  levETIRAcetam  IVPB 1500 milliGRAM(s) IV Intermittent every 12 hours  lidocaine   4% Patch 1 Patch Transdermal daily  multivitamin 1 Tablet(s) Oral daily  mupirocin 2% Ointment 1 Application(s) Topical two times a day  pantoprazole  Injectable 40 milliGRAM(s) IV Push two times a day  senna 2 Tablet(s) Oral at bedtime    MEDICATIONS  (PRN):  oxyCODONE    IR 5 milliGRAM(s) Oral every 6 hours PRN Severe Pain (7 - 10)  polyethylene glycol 3350 17 Gram(s) Oral daily PRN Constipation      ROS  No fever, sweats, chills  No epistaxis, HA, sore throat  No CP, SOB, cough, sputum  No n/v/d, abd pain, melena, hematochezia  No edema  No rash  No anxiety  No back pain, joint pain  No bleeding, bruising  No dysuria, hematuria    Vital Signs Last 24 Hrs  T(C): 37.1 (29 Jan 2023 05:07), Max: 37.1 (29 Jan 2023 05:07)  T(F): 98.7 (29 Jan 2023 05:07), Max: 98.7 (29 Jan 2023 05:07)  HR: 95 (29 Jan 2023 05:07) (83 - 95)  BP: 119/80 (29 Jan 2023 05:07) (106/68 - 123/82)  BP(mean): --  RR: 18 (29 Jan 2023 05:07) (18 - 18)  SpO2: 98% (29 Jan 2023 05:07) (96% - 100%)    Parameters below as of 29 Jan 2023 05:07  Patient On (Oxygen Delivery Method): room air        PE  NAD  Awake, alert  Anicteric, MMM  RRR  CTAB  Abd soft, NT, ND  No c/c/e  No rash grossly                            9.5    11.54 )-----------( 728      ( 29 Jan 2023 05:25 )             29.5       01-29    134<L>  |  99  |  10  ----------------------------<  103<H>  4.0   |  27  |  0.74    Ca    9.1      29 Jan 2023 05:25  Phos  4.7     01-29  Mg     2.00     01-29    TPro  6.8  /  Alb  3.2<L>  /  TBili  0.4  /  DBili  x   /  AST  12  /  ALT  23  /  AlkPhos  116  01-29

## 2023-01-30 LAB
ALBUMIN SERPL ELPH-MCNC: 3.1 G/DL — LOW (ref 3.3–5)
ALP SERPL-CCNC: 107 U/L — SIGNIFICANT CHANGE UP (ref 40–120)
ALT FLD-CCNC: 16 U/L — SIGNIFICANT CHANGE UP (ref 4–41)
ANION GAP SERPL CALC-SCNC: 8 MMOL/L — SIGNIFICANT CHANGE UP (ref 7–14)
AST SERPL-CCNC: 11 U/L — SIGNIFICANT CHANGE UP (ref 4–40)
BILIRUB SERPL-MCNC: 0.4 MG/DL — SIGNIFICANT CHANGE UP (ref 0.2–1.2)
BUN SERPL-MCNC: 16 MG/DL — SIGNIFICANT CHANGE UP (ref 7–23)
CALCIUM SERPL-MCNC: 8.9 MG/DL — SIGNIFICANT CHANGE UP (ref 8.4–10.5)
CHLORIDE SERPL-SCNC: 103 MMOL/L — SIGNIFICANT CHANGE UP (ref 98–107)
CO2 SERPL-SCNC: 27 MMOL/L — SIGNIFICANT CHANGE UP (ref 22–31)
CREAT SERPL-MCNC: 1.02 MG/DL — SIGNIFICANT CHANGE UP (ref 0.5–1.3)
EGFR: 88 ML/MIN/1.73M2 — SIGNIFICANT CHANGE UP
GLUCOSE SERPL-MCNC: 117 MG/DL — HIGH (ref 70–99)
HCT VFR BLD CALC: 30.3 % — LOW (ref 39–50)
HGB BLD-MCNC: 9.4 G/DL — LOW (ref 13–17)
MAGNESIUM SERPL-MCNC: 1.9 MG/DL — SIGNIFICANT CHANGE UP (ref 1.6–2.6)
MCHC RBC-ENTMCNC: 29.1 PG — SIGNIFICANT CHANGE UP (ref 27–34)
MCHC RBC-ENTMCNC: 31 GM/DL — LOW (ref 32–36)
MCV RBC AUTO: 93.8 FL — SIGNIFICANT CHANGE UP (ref 80–100)
NRBC # BLD: 0 /100 WBCS — SIGNIFICANT CHANGE UP (ref 0–0)
NRBC # FLD: 0 K/UL — SIGNIFICANT CHANGE UP (ref 0–0)
PHOSPHATE SERPL-MCNC: 4.3 MG/DL — SIGNIFICANT CHANGE UP (ref 2.5–4.5)
PLATELET # BLD AUTO: 672 K/UL — HIGH (ref 150–400)
POTASSIUM SERPL-MCNC: 4.2 MMOL/L — SIGNIFICANT CHANGE UP (ref 3.5–5.3)
POTASSIUM SERPL-SCNC: 4.2 MMOL/L — SIGNIFICANT CHANGE UP (ref 3.5–5.3)
PROT SERPL-MCNC: 6.5 G/DL — SIGNIFICANT CHANGE UP (ref 6–8.3)
RBC # BLD: 3.23 M/UL — LOW (ref 4.2–5.8)
RBC # FLD: 15.2 % — HIGH (ref 10.3–14.5)
SARS-COV-2 RNA SPEC QL NAA+PROBE: SIGNIFICANT CHANGE UP
SODIUM SERPL-SCNC: 138 MMOL/L — SIGNIFICANT CHANGE UP (ref 135–145)
WBC # BLD: 11.21 K/UL — HIGH (ref 3.8–10.5)
WBC # FLD AUTO: 11.21 K/UL — HIGH (ref 3.8–10.5)

## 2023-01-30 PROCEDURE — 99222 1ST HOSP IP/OBS MODERATE 55: CPT

## 2023-01-30 RX ORDER — POLYETHYLENE GLYCOL 3350 17 G/17G
17 POWDER, FOR SOLUTION ORAL
Refills: 0 | Status: DISCONTINUED | OUTPATIENT
Start: 2023-01-30 | End: 2023-02-06

## 2023-01-30 RX ORDER — SODIUM CHLORIDE 9 MG/ML
1000 INJECTION INTRAMUSCULAR; INTRAVENOUS; SUBCUTANEOUS
Refills: 0 | Status: DISCONTINUED | OUTPATIENT
Start: 2023-01-30 | End: 2023-02-06

## 2023-01-30 RX ADMIN — OXYCODONE HYDROCHLORIDE 5 MILLIGRAM(S): 5 TABLET ORAL at 12:42

## 2023-01-30 RX ADMIN — ENOXAPARIN SODIUM 80 MILLIGRAM(S): 100 INJECTION SUBCUTANEOUS at 05:10

## 2023-01-30 RX ADMIN — OXYCODONE HYDROCHLORIDE 5 MILLIGRAM(S): 5 TABLET ORAL at 21:20

## 2023-01-30 RX ADMIN — ENOXAPARIN SODIUM 80 MILLIGRAM(S): 100 INJECTION SUBCUTANEOUS at 17:02

## 2023-01-30 RX ADMIN — Medication 81 MILLIGRAM(S): at 17:03

## 2023-01-30 RX ADMIN — SENNA PLUS 2 TABLET(S): 8.6 TABLET ORAL at 21:10

## 2023-01-30 RX ADMIN — PANTOPRAZOLE SODIUM 40 MILLIGRAM(S): 20 TABLET, DELAYED RELEASE ORAL at 05:09

## 2023-01-30 RX ADMIN — LEVETIRACETAM 400 MILLIGRAM(S): 250 TABLET, FILM COATED ORAL at 05:44

## 2023-01-30 RX ADMIN — MUPIROCIN 1 APPLICATION(S): 20 OINTMENT TOPICAL at 17:03

## 2023-01-30 RX ADMIN — HYDROMORPHONE HYDROCHLORIDE 1 MILLIGRAM(S): 2 INJECTION INTRAMUSCULAR; INTRAVENOUS; SUBCUTANEOUS at 09:00

## 2023-01-30 RX ADMIN — Medication 1 TABLET(S): at 17:03

## 2023-01-30 RX ADMIN — OXYCODONE HYDROCHLORIDE 5 MILLIGRAM(S): 5 TABLET ORAL at 06:14

## 2023-01-30 RX ADMIN — LACOSAMIDE 120 MILLIGRAM(S): 50 TABLET ORAL at 05:04

## 2023-01-30 RX ADMIN — HYDROMORPHONE HYDROCHLORIDE 1 MILLIGRAM(S): 2 INJECTION INTRAMUSCULAR; INTRAVENOUS; SUBCUTANEOUS at 23:33

## 2023-01-30 RX ADMIN — LEVETIRACETAM 400 MILLIGRAM(S): 250 TABLET, FILM COATED ORAL at 17:20

## 2023-01-30 RX ADMIN — HYDROMORPHONE HYDROCHLORIDE 1 MILLIGRAM(S): 2 INJECTION INTRAMUSCULAR; INTRAVENOUS; SUBCUTANEOUS at 15:02

## 2023-01-30 RX ADMIN — HYDROMORPHONE HYDROCHLORIDE 1 MILLIGRAM(S): 2 INJECTION INTRAMUSCULAR; INTRAVENOUS; SUBCUTANEOUS at 09:30

## 2023-01-30 RX ADMIN — LIDOCAINE 1 PATCH: 4 CREAM TOPICAL at 17:04

## 2023-01-30 RX ADMIN — POLYETHYLENE GLYCOL 3350 17 GRAM(S): 17 POWDER, FOR SOLUTION ORAL at 14:42

## 2023-01-30 RX ADMIN — OXYCODONE HYDROCHLORIDE 5 MILLIGRAM(S): 5 TABLET ORAL at 20:50

## 2023-01-30 RX ADMIN — LACOSAMIDE 120 MILLIGRAM(S): 50 TABLET ORAL at 17:49

## 2023-01-30 RX ADMIN — OXYCODONE HYDROCHLORIDE 5 MILLIGRAM(S): 5 TABLET ORAL at 13:15

## 2023-01-30 RX ADMIN — CHLORHEXIDINE GLUCONATE 1 APPLICATION(S): 213 SOLUTION TOPICAL at 17:03

## 2023-01-30 RX ADMIN — SODIUM CHLORIDE 75 MILLILITER(S): 9 INJECTION INTRAMUSCULAR; INTRAVENOUS; SUBCUTANEOUS at 20:51

## 2023-01-30 RX ADMIN — HYDROMORPHONE HYDROCHLORIDE 1 MILLIGRAM(S): 2 INJECTION INTRAMUSCULAR; INTRAVENOUS; SUBCUTANEOUS at 15:30

## 2023-01-30 RX ADMIN — PANTOPRAZOLE SODIUM 40 MILLIGRAM(S): 20 TABLET, DELAYED RELEASE ORAL at 17:02

## 2023-01-30 RX ADMIN — MUPIROCIN 1 APPLICATION(S): 20 OINTMENT TOPICAL at 05:09

## 2023-01-30 RX ADMIN — SODIUM CHLORIDE 75 MILLILITER(S): 9 INJECTION INTRAMUSCULAR; INTRAVENOUS; SUBCUTANEOUS at 11:08

## 2023-01-30 RX ADMIN — OXYCODONE HYDROCHLORIDE 5 MILLIGRAM(S): 5 TABLET ORAL at 05:44

## 2023-01-30 NOTE — CONSULT NOTE ADULT - SUBJECTIVE AND OBJECTIVE BOX
Patient is a 52y old  Male who presents with a chief complaint of Altered mental status     (25 Jan 2023 14:31)      HPI:  Patient is a 52-year-old male with a past medical history of CVA/TIA, ASD/PFO?, hyperlipidemia presents emerged department today with altered mental status from his nursing facility.  Patient just had a stay in this hospital when he was found unresponsive at home.  An extensive work-up with no significant etiology found.  There were thoughts that it was related to seizure activity however neurology did not find any seizure activity on their work-up.  Patient went to a nursing facility and rehab.  Was doing well until yesterday his parents noted him to be somewhat confused and having word finding difficulties.  Today his speech was worse when noticed by the wife.  He is also noted to have odd behavior where he was pulling at sheets and the strings of the masks.  He did not have this behavior before.  Only residual deficit from his previous strokes with some random word finding abilities however his speech today is reported to be significantly worse with worse word finding.  The patient denies any pain at this time.  He does acknowledge that he is having trouble speaking.  There is also report from the family and patient is complaining of left calf pain.  This pain is much more severe than it has been in the past. (18 Jan 2023 10:06)    Patient was admitted from the [facility] States normally he lives alone in home with no stairs outside but 10 steps inside.  He reports 5/10 and L foot numbness.        REVIEW OF SYSTEMS  pain  sensory impairment    PAST MEDICAL & SURGICAL HISTORY  History of TIAs    CVA (cerebrovascular accident)    HLD (hyperlipidemia)    Vertigo    Unresponsiveness    No significant past surgical history        SOCIAL HISTORY  Smoking - Denied  EtOH - Denied   Drugs - Denied    FUNCTIONAL HISTORY  Lives   Independent    CURRENT FUNCTIONAL STATUS  1/29   Bed Mobility: Sit to Supine:     · Level of [place]	supervision  · Physical Assist/Nonphysical Assist	supervision; verbal cues; nonverbal cues (demo/gestures)    Bed Mobility: Supine to Sit:     · Level of [place]	supervision  · Physical Assist/Nonphysical Assist	supervision; verbal cues; nonverbal cues (demo/gestures)    Bed Mobility Analysis:     · Bed Mobility Limitations	decreased ability to use legs for bridging/pushing  · Impairments Contributing to Impaired Bed Mobility	impaired balance; decreased strength; decreased sensation    Transfer: Sit to Stand:     · Level of [place]	contact guard  · Physical Assist/Nonphysical Assist	verbal cues; nonverbal cues (demo/gestures); 1 person assist  · Weight-Bearing Restrictions	weight-bearing as tolerated; Pt complaints of Left LE diminished sensation. MARK guerra.  · Assistive Device	rolling walker    Transfer: Stand to Sit:     · Level of [place]	contact guard  · Physical Assist/Nonphysical Assist	verbal cues; nonverbal cues (demo/gestures); 1 person assist  · Weight-Bearing Restrictions	weight-bearing as tolerated; Pt complaints of Left LE diminished sensation. MARK guerra.    Sit/Stand Transfer Safety Analysis:     · Transfer Safety Concerns Noted	losing balance; decreased step length  · Impairments Contributing to Impaired Transfers	impaired balance; decreased sensation; decreased strength    Gait Skills:     · Level of [place]	unable to perform; Pt with decreased sensation to LLE, deferred further ambulation at this time due to general weakness    Sensory Examination:    Sensory Examination:    Grossly Intact:   · Gross Sensory Examination	RLE sensation grossly intact; LLE sensation diminished. MARK guerra      Clinical Impressions:   · Criteria for Skilled Therapeutic Interventions	impairments found; anticipated discharge recommendation  · Impairments Found (describe specific impairments)	gait, locomotion, and balance  · Rehab Potential	good, to achieve stated therapy goals  · Therapy Frequency	3-5x/week  · Predicted Duration of Therapy Intervention (days/wks)	2-4 weeks      FAMILY HISTORY   No pertinent family history in first degree relatives         RECENT LABS/IMAGING  CBC Full  -  ( 30 Jan 2023 05:30 )  WBC Count : 11.21 K/uL  RBC Count : 3.23 M/uL  Hemoglobin : 9.4 g/dL  Hematocrit : 30.3 %  Platelet Count - Automated : 672 K/uL  Mean Cell Volume : 93.8 fL  Mean Cell Hemoglobin : 29.1 pg  Mean Cell Hemoglobin Concentration : 31.0 gm/dL  Auto Neutrophil # : x  Auto Lymphocyte # : x  Auto Monocyte # : x  Auto Eosinophil # : x  Auto Basophil # : x  Auto Neutrophil % : x  Auto Lymphocyte % : x  Auto Monocyte % : x  Auto Eosinophil % : x  Auto Basophil % : x    01-30    138  |  103  |  16  ----------------------------<  117<H>  4.2   |  27  |  1.02    Ca    8.9      30 Jan 2023 05:30  Phos  4.3     01-30  Mg     1.90     01-30    TPro  6.5  /  Alb  3.1<L>  /  TBili  0.4  /  DBili  x   /  AST  11  /  ALT  16  /  AlkPhos  107  01-30        VITALS  T(C): 36.7 (01-30-23 @ 05:04), Max: 36.8 (01-29-23 @ 20:00)  HR: 98 (01-30-23 @ 05:04) (86 - 98)  BP: 112/69 (01-30-23 @ 05:04) (111/64 - 112/69)  RR: 17 (01-30-23 @ 05:04) (17 - 18)  SpO2: 100% (01-30-23 @ 05:04) (100% - 100%)  Wt(kg): --    ALLERGIES  No Known Allergies      MEDICATIONS   aspirin enteric coated 81 milliGRAM(s) Oral daily  chlorhexidine 2% Cloths 1 Application(s) Topical daily  enoxaparin Injectable 80 milliGRAM(s) SubCutaneous every 12 hours  HYDROmorphone  Injectable 1 milliGRAM(s) IV Push every 6 hours PRN  lacosamide IVPB 100 milliGRAM(s) IV Intermittent every 12 hours  levETIRAcetam  IVPB 1500 milliGRAM(s) IV Intermittent every 12 hours  lidocaine   4% Patch 1 Patch Transdermal daily  multivitamin 1 Tablet(s) Oral daily  mupirocin 2% Ointment 1 Application(s) Topical two times a day  oxyCODONE    IR 5 milliGRAM(s) Oral every 6 hours PRN  pantoprazole  Injectable 40 milliGRAM(s) IV Push two times a day  polyethylene glycol 3350 17 Gram(s) Oral two times a day  senna 2 Tablet(s) Oral at bedtime  sodium chloride 0.9%. 1000 milliLiter(s) IV Continuous <Continuous>      ----------------------------------------------------------------------------------------  PHYSICAL EXAM  Constitutional - NAD, Comfortable  HEENT - NCAT, EOMI   Chest -no respiratory distress  Cardiovascular - RRR, S1S2   Abdomen - Soft, NTND  Extremities -LLE wound vac.   Neurologic Exam -                    Cognitive - Awake, Alert, AAO to self, place, date, year, situation     Communication - Fluent, No dysarthria     Cranial Nerves - CN 2-12 intact     Motor -                     LEFT    UE - ShAB 5/5, EF 5/5, EE 5/5, WE 5/5,  5/5                    RIGHT UE - ShAB 5/5, EF 5/5, EE 5/5, WE 5/5,  5/5                    LEFT    LE - HF 4+/5                     RIGHT LE - HF 5/5, KE 5/5, DF 5/5, PF 5/5        Sensory - impaired L foot        Balance - WNL Static  Psychiatric - Mood stable, Affect WNL  ----------------------------------------------------------------------------------------  ASSESSMENT/PLAN  53 yo m pmh cva, hld, ? seizure rehab for change in mental status and change in speech as well as L calf pain.  found to have compartment syndrome, now s/p fasciotomy  wound vac  -also with LLE dvt  -tubular adenoma on colonoscopy/biopsy, recommended for outpatient follow up, possible additional biopsy  planning for closure per plastic surgery  Pain -iv dilaudid prn, oxy ir prn, lidocaine patch  diet: regular dash tlc  DVT PPX - lovenox  Rehab -  recommend subacute rehab when medically cleared.   Patient is a 52y old  Male who presents with a chief complaint of Altered mental status     (25 Jan 2023 14:31)      HPI:  Patient is a 52-year-old male with a past medical history of CVA/TIA, ASD/PFO?, hyperlipidemia presents emerged department today with altered mental status from his nursing facility.  Patient just had a stay in this hospital when he was found unresponsive at home.  An extensive work-up with no significant etiology found.  There were thoughts that it was related to seizure activity however neurology did not find any seizure activity on their work-up.  Patient went to a nursing facility and rehab.  Was doing well until yesterday his parents noted him to be somewhat confused and having word finding difficulties.  Today his speech was worse when noticed by the wife.  He is also noted to have odd behavior where he was pulling at sheets and the strings of the masks.  He did not have this behavior before.  Only residual deficit from his previous strokes with some random word finding abilities however his speech today is reported to be significantly worse with worse word finding.  The patient denies any pain at this time.  He does acknowledge that he is having trouble speaking.  There is also report from the family and patient is complaining of left calf pain.  This pain is much more severe than it has been in the past. (18 Jan 2023 10:06)    Patient was admitted from the [facility] States normally he lives alone in home with no stairs outside but 10 steps inside.  He reports 5/10 and L foot numbness.        REVIEW OF SYSTEMS  pain  sensory impairment    PAST MEDICAL & SURGICAL HISTORY  History of TIAs    CVA (cerebrovascular accident)    HLD (hyperlipidemia)    Vertigo    Unresponsiveness    No significant past surgical history        SOCIAL HISTORY  Smoking - Denied  EtOH - Denied   Drugs - Denied    FUNCTIONAL HISTORY  Lives   Independent    CURRENT FUNCTIONAL STATUS  1/29   Bed Mobility: Sit to Supine:     · Level of [place]	supervision  · Physical Assist/Nonphysical Assist	supervision; verbal cues; nonverbal cues (demo/gestures)    Bed Mobility: Supine to Sit:     · Level of [place]	supervision  · Physical Assist/Nonphysical Assist	supervision; verbal cues; nonverbal cues (demo/gestures)    Bed Mobility Analysis:     · Bed Mobility Limitations	decreased ability to use legs for bridging/pushing  · Impairments Contributing to Impaired Bed Mobility	impaired balance; decreased strength; decreased sensation    Transfer: Sit to Stand:     · Level of [place]	contact guard  · Physical Assist/Nonphysical Assist	verbal cues; nonverbal cues (demo/gestures); 1 person assist  · Weight-Bearing Restrictions	weight-bearing as tolerated; Pt complaints of Left LE diminished sensation. MARK guerra.  · Assistive Device	rolling walker    Transfer: Stand to Sit:     · Level of [place]	contact guard  · Physical Assist/Nonphysical Assist	verbal cues; nonverbal cues (demo/gestures); 1 person assist  · Weight-Bearing Restrictions	weight-bearing as tolerated; Pt complaints of Left LE diminished sensation. MARK guerra.    Sit/Stand Transfer Safety Analysis:     · Transfer Safety Concerns Noted	losing balance; decreased step length  · Impairments Contributing to Impaired Transfers	impaired balance; decreased sensation; decreased strength    Gait Skills:     · Level of [place]	unable to perform; Pt with decreased sensation to LLE, deferred further ambulation at this time due to general weakness    Sensory Examination:    Sensory Examination:    Grossly Intact:   · Gross Sensory Examination	RLE sensation grossly intact; LLE sensation diminished. MARK guerra      Clinical Impressions:   · Criteria for Skilled Therapeutic Interventions	impairments found; anticipated discharge recommendation  · Impairments Found (describe specific impairments)	gait, locomotion, and balance  · Rehab Potential	good, to achieve stated therapy goals  · Therapy Frequency	3-5x/week  · Predicted Duration of Therapy Intervention (days/wks)	2-4 weeks      FAMILY HISTORY   No pertinent family history in first degree relatives         RECENT LABS/IMAGING  CBC Full  -  ( 30 Jan 2023 05:30 )  WBC Count : 11.21 K/uL  RBC Count : 3.23 M/uL  Hemoglobin : 9.4 g/dL  Hematocrit : 30.3 %  Platelet Count - Automated : 672 K/uL  Mean Cell Volume : 93.8 fL  Mean Cell Hemoglobin : 29.1 pg  Mean Cell Hemoglobin Concentration : 31.0 gm/dL  Auto Neutrophil # : x  Auto Lymphocyte # : x  Auto Monocyte # : x  Auto Eosinophil # : x  Auto Basophil # : x  Auto Neutrophil % : x  Auto Lymphocyte % : x  Auto Monocyte % : x  Auto Eosinophil % : x  Auto Basophil % : x    01-30    138  |  103  |  16  ----------------------------<  117<H>  4.2   |  27  |  1.02    Ca    8.9      30 Jan 2023 05:30  Phos  4.3     01-30  Mg     1.90     01-30    TPro  6.5  /  Alb  3.1<L>  /  TBili  0.4  /  DBili  x   /  AST  11  /  ALT  16  /  AlkPhos  107  01-30        VITALS  T(C): 36.7 (01-30-23 @ 05:04), Max: 36.8 (01-29-23 @ 20:00)  HR: 98 (01-30-23 @ 05:04) (86 - 98)  BP: 112/69 (01-30-23 @ 05:04) (111/64 - 112/69)  RR: 17 (01-30-23 @ 05:04) (17 - 18)  SpO2: 100% (01-30-23 @ 05:04) (100% - 100%)  Wt(kg): --    ALLERGIES  No Known Allergies      MEDICATIONS   aspirin enteric coated 81 milliGRAM(s) Oral daily  chlorhexidine 2% Cloths 1 Application(s) Topical daily  enoxaparin Injectable 80 milliGRAM(s) SubCutaneous every 12 hours  HYDROmorphone  Injectable 1 milliGRAM(s) IV Push every 6 hours PRN  lacosamide IVPB 100 milliGRAM(s) IV Intermittent every 12 hours  levETIRAcetam  IVPB 1500 milliGRAM(s) IV Intermittent every 12 hours  lidocaine   4% Patch 1 Patch Transdermal daily  multivitamin 1 Tablet(s) Oral daily  mupirocin 2% Ointment 1 Application(s) Topical two times a day  oxyCODONE    IR 5 milliGRAM(s) Oral every 6 hours PRN  pantoprazole  Injectable 40 milliGRAM(s) IV Push two times a day  polyethylene glycol 3350 17 Gram(s) Oral two times a day  senna 2 Tablet(s) Oral at bedtime  sodium chloride 0.9%. 1000 milliLiter(s) IV Continuous <Continuous>      ----------------------------------------------------------------------------------------  PHYSICAL EXAM  Constitutional - NAD, Comfortable  HEENT - NCAT, EOMI   Chest -no respiratory distress  Cardiovascular - RRR, S1S2   Abdomen - Soft, NTND  Extremities -LLE wound vac.   Neurologic Exam -                    Cognitive - Awake, Alert, AAO to self, place, date, year, situation     Communication - Fluent, No dysarthria     Cranial Nerves - CN 2-12 intact     Motor -                     LEFT    UE - ShAB 5/5, EF 5/5, EE 5/5, WE 5/5,  5/5                    RIGHT UE - ShAB 5/5, EF 5/5, EE 5/5, WE 5/5,  5/5                    LEFT    LE - HF 4+/5                     RIGHT LE - HF 5/5, KE 5/5, DF 5/5, PF 5/5        Sensory - impaired L foot        Balance - WNL Static  Psychiatric - Mood stable, Affect WNL  ----------------------------------------------------------------------------------------  ASSESSMENT/PLAN  53 yo m pmh cva, hld, ? seizure rehab for change in mental status and change in speech as well as L calf pain.  found to have compartment syndrome, now s/p fasciotomy  wound vac  -also with LLE dvt  -tubular adenoma on colonoscopy/biopsy, recommended for outpatient follow up, possible additional biopsy  planning for closure per plastic surgery  Pain -iv dilaudid prn, oxy ir prn, lidocaine patch  diet: regular dash tlc  DVT PPX - lovenox  Rehab -  recommend subacute rehab when medically cleared.    Patient lives alone, has not been able to take steps at this point. will monitor for improvement

## 2023-01-30 NOTE — PROGRESS NOTE ADULT - SUBJECTIVE AND OBJECTIVE BOX
Plastic Surgery Progress Note (pg [facility]: [id number], [facility]: [phone])    SUBJECTIVE  The patient was seen and examined. No acute events overnight.  Doing well with some intermittent LLE pain but otherwise feeling fine.  no longer on hep drip     OBJECTIVE  ___________________________________________________  VITAL SIGNS / I&O's   Vital Signs Last 24 Hrs  T(C): 36.7 (30 Jan 2023 05:04), Max: 36.8 (29 Jan 2023 20:00)  T(F): 98.1 (30 Jan 2023 05:04), Max: 98.3 (29 Jan 2023 20:00)  HR: 98 (30 Jan 2023 05:04) (86 - 98)  BP: 112/69 (30 Jan 2023 05:04) (111/64 - 112/69)  BP(mean): --  RR: 17 (30 Jan 2023 05:04) (17 - 18)  SpO2: 100% (30 Jan 2023 05:04) (100% - 100%)    Parameters below as of 30 Jan 2023 05:04  Patient On (Oxygen Delivery Method): room air          ___________________________________________________  PHYSICAL EXAM    Gen: resting in bed comfortably in NAD  Chest: no increased WOB, regular inspiratory effort   Abdomen: Soft, nontender, nondistended with no rebound tenderness or guarding.   Ext: LLE dressing saturated with oozing from wounds, tissue underneath viable; wet to maci dressing exchanged this AM and vac placed on both medial and lateral wounds    Neuro: awake, alert    ___________________________________________________  LABS                        9.4    11.21 )-----------( 672      ( 30 Jan 2023 05:30 )             30.3     30 Jan 2023 05:30    138    |  103    |  16     ----------------------------<  117    4.2     |  27     |  1.02     Ca    8.9        30 Jan 2023 05:30  Phos  4.3       30 Jan 2023 05:30  Mg     1.90      30 Jan 2023 05:30    TPro  6.5    /  Alb  3.1    /  TBili  0.4    /  DBili  x      /  AST  11     /  ALT  16     /  AlkPhos  107    30 Jan 2023 05:30      CAPILLARY BLOOD GLUCOSE              ___________________________________________________  MICRO  Recent Cultures:    ___________________________________________________  MEDICATIONS  (STANDING):  aspirin enteric coated 81 milliGRAM(s) Oral daily  chlorhexidine 2% Cloths 1 Application(s) Topical daily  enoxaparin Injectable 80 milliGRAM(s) SubCutaneous every 12 hours  lacosamide IVPB 100 milliGRAM(s) IV Intermittent every 12 hours  levETIRAcetam  IVPB 1500 milliGRAM(s) IV Intermittent every 12 hours  lidocaine   4% Patch 1 Patch Transdermal daily  multivitamin 1 Tablet(s) Oral daily  mupirocin 2% Ointment 1 Application(s) Topical two times a day  pantoprazole  Injectable 40 milliGRAM(s) IV Push two times a day  senna 2 Tablet(s) Oral at bedtime  sodium chloride 0.9%. 1000 milliLiter(s) (75 mL/Hr) IV Continuous <Continuous>    MEDICATIONS  (PRN):  HYDROmorphone  Injectable 1 milliGRAM(s) IV Push every 6 hours PRN severe breakthrough pain  oxyCODONE    IR 5 milliGRAM(s) Oral every 6 hours PRN Severe Pain (7 - 10)  polyethylene glycol 3350 17 Gram(s) Oral daily PRN Constipation

## 2023-01-30 NOTE — PROGRESS NOTE ADULT - ASSESSMENT
Patient is a 52-year-old male with a past medical history of CVA/TIA, ASD/PFO?, hyperlipidemia s/p  left lower extremity fasciotomies due to compartment syndrome on 1/18.  Pt had recent washout of wounds two days ago.  Plastic surgery consulted for possible closure/skin graft.      PLAN:  - s/p left lower ext fasciotomies  - cont wound dressings for now  - vac changed today and placed today on medial and lateral compartments  - PRS to continue monitoring wound status   - will plan for closure at later date as we assess wound status     Plastic Surgery  [id number]

## 2023-01-30 NOTE — PROGRESS NOTE ADULT - SUBJECTIVE AND OBJECTIVE BOX
Name of Patient : TAMI DUNHAM  MRN: [id number]  Date of visit: 01-30-23       Subjective: Patient seen and examined. No new events except as noted.   Doing okay     REVIEW OF SYSTEMS:    CONSTITUTIONAL: No weakness, fevers or chills  EYES/ENT: No visual changes;  No vertigo or throat pain   NECK: No pain or stiffness  RESPIRATORY: No cough, wheezing, hemoptysis; No shortness of breath  CARDIOVASCULAR: No chest pain or palpitations  GASTROINTESTINAL: No abdominal or epigastric pain. No nausea, vomiting, or hematemesis; No diarrhea or constipation. No melena or hematochezia.  GENITOURINARY: No dysuria, frequency or hematuria  NEUROLOGICAL: No numbness or weakness  SKIN: No itching, burning, rashes, or lesions   All other review of systems is negative unless indicated above.    MEDICATIONS:  MEDICATIONS  (STANDING):  aspirin enteric coated 81 milliGRAM(s) Oral daily  chlorhexidine 2% Cloths 1 Application(s) Topical daily  enoxaparin Injectable 80 milliGRAM(s) SubCutaneous every 12 hours  lacosamide IVPB 100 milliGRAM(s) IV Intermittent every 12 hours  levETIRAcetam  IVPB 1500 milliGRAM(s) IV Intermittent every 12 hours  lidocaine   4% Patch 1 Patch Transdermal daily  multivitamin 1 Tablet(s) Oral daily  mupirocin 2% Ointment 1 Application(s) Topical two times a day  pantoprazole  Injectable 40 milliGRAM(s) IV Push two times a day  polyethylene glycol 3350 17 Gram(s) Oral two times a day  senna 2 Tablet(s) Oral at bedtime  sodium chloride 0.9%. 1000 milliLiter(s) (75 mL/Hr) IV Continuous <Continuous>      PHYSICAL EXAM:  T(C): 36.8 (01-30-23 @ 11:07), Max: 36.8 (01-30-23 @ 11:07)  HR: 86 (01-30-23 @ 11:07) (86 - 98)  BP: 115/70 (01-30-23 @ 11:07) (112/69 - 115/70)  RR: 18 (01-30-23 @ 11:07) (17 - 18)  SpO2: 100% (01-30-23 @ 11:07) (100% - 100%)  Wt(kg): --  I&O's Summary        Appearance: Normal	  HEENT:  PERRLA   Lymphatic: No lymphadenopathy   Cardiovascular: Normal S1 S2, no JVD  Respiratory: normal effort , clear  Gastrointestinal:  Soft, Non-tender  Skin: No rashes,  warm to touch  Psychiatry:  Mood & affect appropriate  Musculuskeletal: LE dressing, wound vac    recent labs, Imaging and EKGs personally reviewed                           9.4    11.21 )-----------( 672      ( 30 Jan 2023 05:30 )             30.3               01-30    138  |  103  |  16  ----------------------------<  117<H>  4.2   |  27  |  1.02    Ca    8.9      30 Jan 2023 05:30  Phos  4.3     01-30  Mg     1.90     01-30    TPro  6.5  /  Alb  3.1<L>  /  TBili  0.4  /  DBili  x   /  AST  11  /  ALT  16  /  AlkPhos  107  01-30

## 2023-01-30 NOTE — OCCUPATIONAL THERAPY INITIAL EVALUATION ADULT - PERTINENT HX OF CURRENT PROBLEM, REHAB EVAL
Pt is a 51 y/o male with PMH of CVA, HLD, ? seizure rehab for change in mental status and change in speech as well as L calf pain.  found to have compartment syndrome, now s/p fasciotomy left calf. Pt also found to have LLE DVT.

## 2023-01-30 NOTE — ADVANCED PRACTICE NURSE CONSULT - RECOMMEDATIONS
Plan discussed with patient. Questions answered.     WOC service line to continue to follow.     Please contact Wound/Ostomy Care Service Line if we can be of further assistance (ext [phone]). 
As per Plastics team holding off NPWT at this time will reassess tomorrow.    Please contact Wound/Ostomy Care Service Line if we can be of further assistance (ext [phone]). 
WOC service line will continue to follow.     Please contact Wound/Ostomy Care Service Line if we can be of further assistance (ext [phone]). 
WOC team will continue to follow.   Please contact Wound/Ostomy Care Service Line if we can be of further assistance (ext [phone]). 
Wound care team will continue to see patient on M W F schedule.     Please contact Wound/Ostomy Care Service Line if we can be of further assistance (ext [phone]).

## 2023-01-30 NOTE — ADVANCED PRACTICE NURSE CONSULT - ASSESSMENT
Serosanguinous drainage in canister over the weekend. Heparin drip transitioned to Lovenox. No active sanguinous drainage from either wound. Applied NPWT to both sides. See A&I flowsheet for full wound assessment details.

## 2023-01-30 NOTE — CONSULT NOTE ADULT - CONSULT REQUESTED DATE/TIME
18-Jan-2023 14:40
24-Jan-2023 14:24
18-Jan-2023 11:18
18-Jan-2023 13:18
30-Jan-2023 17:02
18-Jan-2023 13:29
18-Jan-2023 15:51

## 2023-01-30 NOTE — PROGRESS NOTE ADULT - SUBJECTIVE AND OBJECTIVE BOX
Neurology Progress Note    S: Patient seen and examined. No new events overnight. patient denied CP, SOB, HA or pain.     Medication:  MEDICATIONS  (STANDING):  aspirin enteric coated 81 milliGRAM(s) Oral daily  chlorhexidine 2% Cloths 1 Application(s) Topical daily  enoxaparin Injectable 80 milliGRAM(s) SubCutaneous every 12 hours  lacosamide IVPB 100 milliGRAM(s) IV Intermittent every 12 hours  levETIRAcetam  IVPB 1500 milliGRAM(s) IV Intermittent every 12 hours  lidocaine   4% Patch 1 Patch Transdermal daily  multivitamin 1 Tablet(s) Oral daily  mupirocin 2% Ointment 1 Application(s) Topical two times a day  pantoprazole  Injectable 40 milliGRAM(s) IV Push two times a day  polyethylene glycol 3350 17 Gram(s) Oral two times a day  senna 2 Tablet(s) Oral at bedtime  sodium chloride 0.9%. 1000 milliLiter(s) (75 mL/Hr) IV Continuous <Continuous>    MEDICATIONS  (PRN):  HYDROmorphone  Injectable 1 milliGRAM(s) IV Push every 6 hours PRN severe breakthrough pain  oxyCODONE    IR 5 milliGRAM(s) Oral every 6 hours PRN Severe Pain (7 - 10)      Vitals:    Vital Signs Last 24 Hrs  T(C): 36.7 (30 Jan 2023 05:04), Max: 36.8 (29 Jan 2023 20:00)  T(F): 98.1 (30 Jan 2023 05:04), Max: 98.3 (29 Jan 2023 20:00)  HR: 98 (30 Jan 2023 05:04) (86 - 98)  BP: 112/69 (30 Jan 2023 05:04) (111/64 - 112/69)  BP(mean): --  RR: 17 (30 Jan 2023 05:04) (17 - 18)  SpO2: 100% (30 Jan 2023 05:04) (100% - 100%)    Parameters below as of 30 Jan 2023 05:04  Patient On (Oxygen Delivery Method): room air            General Exam:   General Appearance: Appropriately dressed and in no acute distress       Head: Normocephalic, atraumatic and no dysmorphic features  Ear, Nose, and Throat: Moist mucous membranes  CVS: S1S2+  Resp: No SOB, no wheeze or rhonchi  Abd: soft NTND  Extremities: No edema, no cyanosis  Skin: No bruises, no rashes     Neurological Exam:  Mental Status: Awake, alert and oriented x 3.  Able to follow simple and complex verbal commands. Able to name and repeat. fluent speech. No obvious aphasia or dysarthria noted.   Cranial Nerves: PERRL, EOMI, VFFC, sensation V1-V3 intact,  no obvious facial asymmetry , equal elevation of palate, scm/trap 5/5, tongue is midline on protrusion. no obvious papilledema on fundoscopic exam. Hearing is grossly intact.   Motor: Normal bulk, tone and strength throughout upperse. LLE in brace s/p OR. some distal limb movement. RLE 4+/5   Sensation: Intact to light touch and pinprick throughout. no right/left confusion. no extinction to tactile on DSS.   Reflexes: 1+ throughout at biceps, brachioradialis, triceps, patellars and ankles bilaterally and equal. No clonus. R toe and L toe were both downgoing.  Coordination: No dysmetria on FNF    Gait: deferred     I personally reviewed the below data/images/labs:  CBC Full  -  ( 30 Jan 2023 05:30 )  WBC Count : 11.21 K/uL  RBC Count : 3.23 M/uL  Hemoglobin : 9.4 g/dL  Hematocrit : 30.3 %  Platelet Count - Automated : 672 K/uL  Mean Cell Volume : 93.8 fL  Mean Cell Hemoglobin : 29.1 pg  Mean Cell Hemoglobin Concentration : 31.0 gm/dL  Auto Neutrophil # : x  Auto Lymphocyte # : x  Auto Monocyte # : x  Auto Eosinophil # : x  Auto Basophil # : x  Auto Neutrophil % : x  Auto Lymphocyte % : x  Auto Monocyte % : x  Auto Eosinophil % : x  Auto Basophil % : x    01-30    138  |  103  |  16  ----------------------------<  117<H>  4.2   |  27  |  1.02    Ca    8.9      30 Jan 2023 05:30  Phos  4.3     01-30  Mg     1.90     01-30    TPro  6.5  /  Alb  3.1<L>  /  TBili  0.4  /  DBili  x   /  AST  11  /  ALT  16  /  AlkPhos  107  01-30    MRI:   IMPRESSION:    Chronic infarcts of the right frontal subcortical and right centrum   semiovale. No new areas of infarct are identified. Unchanged scattered   foci of hemosiderin.    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   Differential diagnosis includes infection, sarcoidosis, lymphoma and   CLIPPERS (chronic lymphocytic inflammation with pontine perivascular   enhancement unresponsive to steroids).    --- End of Report ---    < end of copied text >  < from: MR Head w/wo IV Cont (12.29.22 @ 13:39) >  IMPRESSION:    Chronic infarcts of the right frontal subcortical and right centrum   semiovale. No new areas of infarct are identified. Unchanged scattered   foci of hemosiderin.    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   Differential diagnosis includes infection, sarcoidosis, lymphoma and   CLIPPERS (chronic lymphocytic inflammation with pontine perivascular   enhancement unresponsive to steroids).    --- End of Report ---    < end of copied text >  < from: MR Cervical Spine w/wo IV Cont (01.24.23 @ 08:43) >    ACC: [id number] EXAM:  MR SPINE CERVICAL WAW IC   ORDERED BY: GABBIE HUTCHINS     ACC: [id number] EXAM:  MR BRAIN WAW IC   ORDERED BY: GABBIE HUTCHINS     PROCEDURE DATE:  01/24/2023          INTERPRETATION:  CLINICAL INFORMATION: Altered mental status.    TECHNIQUE: Multisequence, multiplanar MRI of the brain and cervical spine   was obtained before and after the administration of intravenous contrast.    IV Contrast Administered: 8 cc Gadavist.  IV Contrast Discarded: 2 cc    COMPARISON: MRI brain 12/29/2022    FINDINGS:    MRI BRAIN:    There is no intraparenchymal hematoma, mass effect or midline shift.   There are a few foci of increased signal on T2/FLAIR involving the right   frontal subcortical region and right centrum semiovale, representing the   sequela of known prior infarcts. No new areas of infarct are identified.    Areas of contrast enhancement in the bilateral lentiform nucleus, thalami   and alexis appear less conspicuous/improved compared with prior study. Mild   associated increased T2 signal is unchanged to mildly improved.    No abnormal extra-axial fluid collections are present. There is no   diffusion abnormality to suggest acute or subacute infarction. Major   flow-voids at the base of the brain follow expected course and contour.   No abnormal enhancement on post-contrast images.    Proportional prominence of the sulci and ventricles, within normal limits   for the patient's age.    The calvarium is intact. The visualized intraorbital compartments are  intact. The paranasal sinuses are clear. The tympanomastoid cavities   appear free of acute disease.    MRI CERVICAL SPINE:    There is normal cervical lordosis. The vertebral bodies are of normal   height and configuration. The intervertebral discs spaces are within   normal limits. Right uncinate hypertrophy/right paracentral disc   osteophyte complex involving the C3-C4 level and a disc osteophyte   complex involving the C6-C7 level results in mild central narrowing.   Bilateral uncovertebral degenerative changes at C5-6 results in mild   bilateral neural foramina narrowing. The marrow signal is within normal   limits.  No prevertebral or paraspinal edema.    There is no cord compression or cord impingement. There is no epidural   collection.    There is faint enhancement involving the visualized portions of the alexis   and medulla and superior most cervical cord. Evaluation of the remainder   of the spinal cord demonstrates no evidence of abnormal signal changes or   abnormal enhancement.    IMPRESSION:    Areas of contrast enhancement in the bilateral lentiform nucleus, thalami   and alexis appear less conspicuous / improved compared with prior study.   There is minimal involvement of the upper cervical cord. Differential   includes meningothelial hyperplasia, neurosarcoidosis, lymphoma,   vasculitis and infection. Lower suspicion for CLIPPERS (chronic   lymphocytic inflammation with pontine perivascular enhancement responsive   to steroids) given the distribution.    Chronic infarcts involving the right frontal subcortical region and   right-sided semiovale. No new areas of infarct are identified.    --- End of Report ---          STEPHEN DE JESUS MD; Resident Radiology  This document has been electronically signed.  LEIGHANN JJ MD; Attending Radiologist  This document has been electronically signed. Jan 24 2023  1:32PM    < end of copied text >

## 2023-01-30 NOTE — ADVANCED PRACTICE NURSE CONSULT - REASON FOR CONSULT
Patient seen for scheduled NPWT dressing change. Plastics resident (Bridger GUNTER) at bedside for wound assessment.

## 2023-01-30 NOTE — PROGRESS NOTE ADULT - ASSESSMENT
52-year-old  M  TIAs hx TIAS and strokes initially thought to be 2/2 testosterone with recent admission for seizures and AMS here with confusion. PE with psychomotor slowing, aphasia frontal reflexes bilaterally distal LE weakness here iwth AMS, found to have a DVT iun the LLE MRi on 12/29/22 showed chronic right frontalm centrum semiovale strokes and bilateral lentiform and pontine enhancemeent  -s/p fasciotomy for left leg compartment syndrome 1/18  s/p wash out on 1/23   vEEG neg   MRI brain and C spine done 1/24/23: chronic R frontal and R semiovale infarcts. contrast enhancement imporved.  DDx c/f neurosarcoid, lymphoma, vasculitis, less suspicion for CLIPPERS     Impression: AMS with bilateral pontine and lentiform enhancement ? Clippers vs other; LLE 2/2 compartment syndrome     -LP with extensive CSF studies unrevealing  -Body imaging unrevealing, pelvic LNS not amenable to BX as per IR  - nsx no intervention at this time   - no role for hgih dose steroids   -Would reach out to NSX if amenable to biopsy  -On Keppra 1500 mg BID and Vimpat 100 mg BID  outpatient hypercoag workup neg   - heparin drip. statin held given rise in LFTs; changed to full dose lovenox  dcp    Braxton Forde MD  Vascular Neurology  Office: [phone]

## 2023-01-31 LAB
ALBUMIN SERPL ELPH-MCNC: 2.7 G/DL — LOW (ref 3.3–5)
ALP SERPL-CCNC: 93 U/L — SIGNIFICANT CHANGE UP (ref 40–120)
ALT FLD-CCNC: 16 U/L — SIGNIFICANT CHANGE UP (ref 4–41)
ANION GAP SERPL CALC-SCNC: 9 MMOL/L — SIGNIFICANT CHANGE UP (ref 7–14)
APPEARANCE UR: ABNORMAL
AST SERPL-CCNC: 9 U/L — SIGNIFICANT CHANGE UP (ref 4–40)
BACTERIA # UR AUTO: ABNORMAL
BILIRUB SERPL-MCNC: 0.3 MG/DL — SIGNIFICANT CHANGE UP (ref 0.2–1.2)
BILIRUB UR-MCNC: NEGATIVE — SIGNIFICANT CHANGE UP
BUN SERPL-MCNC: 12 MG/DL — SIGNIFICANT CHANGE UP (ref 7–23)
CALCIUM SERPL-MCNC: 8.5 MG/DL — SIGNIFICANT CHANGE UP (ref 8.4–10.5)
CHLORIDE SERPL-SCNC: 103 MMOL/L — SIGNIFICANT CHANGE UP (ref 98–107)
CO2 SERPL-SCNC: 25 MMOL/L — SIGNIFICANT CHANGE UP (ref 22–31)
COLOR SPEC: SIGNIFICANT CHANGE UP
CREAT SERPL-MCNC: 0.69 MG/DL — SIGNIFICANT CHANGE UP (ref 0.5–1.3)
DIFF PNL FLD: ABNORMAL
EGFR: 111 ML/MIN/1.73M2 — SIGNIFICANT CHANGE UP
EPI CELLS # UR: 8 /HPF — HIGH (ref 0–5)
GLUCOSE SERPL-MCNC: 100 MG/DL — HIGH (ref 70–99)
GLUCOSE UR QL: NEGATIVE — SIGNIFICANT CHANGE UP
HCT VFR BLD CALC: 26.4 % — LOW (ref 39–50)
HGB BLD-MCNC: 8.3 G/DL — LOW (ref 13–17)
HYALINE CASTS # UR AUTO: 2 /LPF — SIGNIFICANT CHANGE UP (ref 0–7)
KETONES UR-MCNC: NEGATIVE — SIGNIFICANT CHANGE UP
LEUKOCYTE ESTERASE UR-ACNC: ABNORMAL
MAGNESIUM SERPL-MCNC: 1.8 MG/DL — SIGNIFICANT CHANGE UP (ref 1.6–2.6)
MCHC RBC-ENTMCNC: 29.5 PG — SIGNIFICANT CHANGE UP (ref 27–34)
MCHC RBC-ENTMCNC: 31.4 GM/DL — LOW (ref 32–36)
MCV RBC AUTO: 94 FL — SIGNIFICANT CHANGE UP (ref 80–100)
NITRITE UR-MCNC: NEGATIVE — SIGNIFICANT CHANGE UP
NRBC # BLD: 0 /100 WBCS — SIGNIFICANT CHANGE UP (ref 0–0)
NRBC # FLD: 0 K/UL — SIGNIFICANT CHANGE UP (ref 0–0)
PH UR: 6.5 — SIGNIFICANT CHANGE UP (ref 5–8)
PHOSPHATE SERPL-MCNC: 4.1 MG/DL — SIGNIFICANT CHANGE UP (ref 2.5–4.5)
PLATELET # BLD AUTO: 573 K/UL — HIGH (ref 150–400)
POTASSIUM SERPL-MCNC: 4 MMOL/L — SIGNIFICANT CHANGE UP (ref 3.5–5.3)
POTASSIUM SERPL-SCNC: 4 MMOL/L — SIGNIFICANT CHANGE UP (ref 3.5–5.3)
PROT SERPL-MCNC: 6.1 G/DL — SIGNIFICANT CHANGE UP (ref 6–8.3)
PROT UR-MCNC: ABNORMAL
RBC # BLD: 2.81 M/UL — LOW (ref 4.2–5.8)
RBC # FLD: 15 % — HIGH (ref 10.3–14.5)
RBC CASTS # UR COMP ASSIST: 10 /HPF — HIGH (ref 0–4)
SODIUM SERPL-SCNC: 137 MMOL/L — SIGNIFICANT CHANGE UP (ref 135–145)
SP GR SPEC: 1.01 — SIGNIFICANT CHANGE UP (ref 1.01–1.05)
UROBILINOGEN FLD QL: SIGNIFICANT CHANGE UP
WBC # BLD: 9.68 K/UL — SIGNIFICANT CHANGE UP (ref 3.8–10.5)
WBC # FLD AUTO: 9.68 K/UL — SIGNIFICANT CHANGE UP (ref 3.8–10.5)
WBC UR QL: 86 /HPF — HIGH (ref 0–5)

## 2023-01-31 PROCEDURE — 99232 SBSQ HOSP IP/OBS MODERATE 35: CPT

## 2023-01-31 RX ORDER — CEFTRIAXONE 500 MG/1
1000 INJECTION, POWDER, FOR SOLUTION INTRAMUSCULAR; INTRAVENOUS EVERY 24 HOURS
Refills: 0 | Status: COMPLETED | OUTPATIENT
Start: 2023-01-31 | End: 2023-02-02

## 2023-01-31 RX ADMIN — OXYCODONE HYDROCHLORIDE 5 MILLIGRAM(S): 5 TABLET ORAL at 21:28

## 2023-01-31 RX ADMIN — HYDROMORPHONE HYDROCHLORIDE 1 MILLIGRAM(S): 2 INJECTION INTRAMUSCULAR; INTRAVENOUS; SUBCUTANEOUS at 18:30

## 2023-01-31 RX ADMIN — HYDROMORPHONE HYDROCHLORIDE 1 MILLIGRAM(S): 2 INJECTION INTRAMUSCULAR; INTRAVENOUS; SUBCUTANEOUS at 00:03

## 2023-01-31 RX ADMIN — PANTOPRAZOLE SODIUM 40 MILLIGRAM(S): 20 TABLET, DELAYED RELEASE ORAL at 17:01

## 2023-01-31 RX ADMIN — LIDOCAINE 1 PATCH: 4 CREAM TOPICAL at 18:30

## 2023-01-31 RX ADMIN — CEFTRIAXONE 100 MILLIGRAM(S): 500 INJECTION, POWDER, FOR SOLUTION INTRAMUSCULAR; INTRAVENOUS at 19:34

## 2023-01-31 RX ADMIN — OXYCODONE HYDROCHLORIDE 5 MILLIGRAM(S): 5 TABLET ORAL at 06:48

## 2023-01-31 RX ADMIN — ENOXAPARIN SODIUM 80 MILLIGRAM(S): 100 INJECTION SUBCUTANEOUS at 17:00

## 2023-01-31 RX ADMIN — OXYCODONE HYDROCHLORIDE 5 MILLIGRAM(S): 5 TABLET ORAL at 06:18

## 2023-01-31 RX ADMIN — OXYCODONE HYDROCHLORIDE 5 MILLIGRAM(S): 5 TABLET ORAL at 14:45

## 2023-01-31 RX ADMIN — LIDOCAINE 1 PATCH: 4 CREAM TOPICAL at 06:14

## 2023-01-31 RX ADMIN — LIDOCAINE 1 PATCH: 4 CREAM TOPICAL at 17:01

## 2023-01-31 RX ADMIN — MUPIROCIN 1 APPLICATION(S): 20 OINTMENT TOPICAL at 17:08

## 2023-01-31 RX ADMIN — PANTOPRAZOLE SODIUM 40 MILLIGRAM(S): 20 TABLET, DELAYED RELEASE ORAL at 05:09

## 2023-01-31 RX ADMIN — MUPIROCIN 1 APPLICATION(S): 20 OINTMENT TOPICAL at 05:10

## 2023-01-31 RX ADMIN — CHLORHEXIDINE GLUCONATE 1 APPLICATION(S): 213 SOLUTION TOPICAL at 17:02

## 2023-01-31 RX ADMIN — ENOXAPARIN SODIUM 80 MILLIGRAM(S): 100 INJECTION SUBCUTANEOUS at 05:10

## 2023-01-31 RX ADMIN — HYDROMORPHONE HYDROCHLORIDE 1 MILLIGRAM(S): 2 INJECTION INTRAMUSCULAR; INTRAVENOUS; SUBCUTANEOUS at 18:09

## 2023-01-31 RX ADMIN — SENNA PLUS 2 TABLET(S): 8.6 TABLET ORAL at 22:17

## 2023-01-31 RX ADMIN — OXYCODONE HYDROCHLORIDE 5 MILLIGRAM(S): 5 TABLET ORAL at 19:28

## 2023-01-31 RX ADMIN — LACOSAMIDE 120 MILLIGRAM(S): 50 TABLET ORAL at 17:01

## 2023-01-31 RX ADMIN — Medication 81 MILLIGRAM(S): at 17:00

## 2023-01-31 RX ADMIN — SODIUM CHLORIDE 75 MILLILITER(S): 9 INJECTION INTRAMUSCULAR; INTRAVENOUS; SUBCUTANEOUS at 14:02

## 2023-01-31 RX ADMIN — LEVETIRACETAM 400 MILLIGRAM(S): 250 TABLET, FILM COATED ORAL at 17:00

## 2023-01-31 RX ADMIN — Medication 1 TABLET(S): at 17:00

## 2023-01-31 RX ADMIN — LEVETIRACETAM 400 MILLIGRAM(S): 250 TABLET, FILM COATED ORAL at 05:55

## 2023-01-31 RX ADMIN — OXYCODONE HYDROCHLORIDE 5 MILLIGRAM(S): 5 TABLET ORAL at 14:01

## 2023-01-31 RX ADMIN — LACOSAMIDE 120 MILLIGRAM(S): 50 TABLET ORAL at 05:03

## 2023-01-31 NOTE — PROGRESS NOTE ADULT - SUBJECTIVE AND OBJECTIVE BOX
Neurology Progress Note    S: Patient seen and examined. No new events overnight. patient denied CP, SOB, HA or pain.     Medication:  MEDICATIONS  (STANDING):  aspirin enteric coated 81 milliGRAM(s) Oral daily  chlorhexidine 2% Cloths 1 Application(s) Topical daily  enoxaparin Injectable 80 milliGRAM(s) SubCutaneous every 12 hours  lacosamide IVPB 100 milliGRAM(s) IV Intermittent every 12 hours  levETIRAcetam  IVPB 1500 milliGRAM(s) IV Intermittent every 12 hours  lidocaine   4% Patch 1 Patch Transdermal daily  multivitamin 1 Tablet(s) Oral daily  mupirocin 2% Ointment 1 Application(s) Topical two times a day  pantoprazole  Injectable 40 milliGRAM(s) IV Push two times a day  polyethylene glycol 3350 17 Gram(s) Oral two times a day  senna 2 Tablet(s) Oral at bedtime  sodium chloride 0.9%. 1000 milliLiter(s) (75 mL/Hr) IV Continuous <Continuous>    MEDICATIONS  (PRN):  HYDROmorphone  Injectable 1 milliGRAM(s) IV Push every 6 hours PRN severe breakthrough pain  oxyCODONE    IR 5 milliGRAM(s) Oral every 6 hours PRN Severe Pain (7 - 10)      Vitals:    Vital Signs Last 24 Hrs  T(C): 36.8 (31 Jan 2023 12:10), Max: 37.3 (31 Jan 2023 05:03)  T(F): 98.3 (31 Jan 2023 12:10), Max: 99.1 (31 Jan 2023 05:03)  HR: 81 (31 Jan 2023 12:10) (80 - 85)  BP: 106/61 (31 Jan 2023 12:10) (106/61 - 110/71)  BP(mean): 72 (31 Jan 2023 12:10) (72 - 72)  RR: 17 (31 Jan 2023 12:10) (17 - 17)  SpO2: 100% (31 Jan 2023 12:10) (99% - 100%)    Parameters below as of 31 Jan 2023 12:10  Patient On (Oxygen Delivery Method): room air            General Exam:   General Appearance: Appropriately dressed and in no acute distress       Head: Normocephalic, atraumatic and no dysmorphic features  Ear, Nose, and Throat: Moist mucous membranes  CVS: S1S2+  Resp: No SOB, no wheeze or rhonchi  Abd: soft NTND  Extremities: No edema, no cyanosis  Skin: No bruises, no rashes     Neurological Exam:  Mental Status: Awake, alert and oriented x 3.  Able to follow simple and complex verbal commands. Able to name and repeat. fluent speech. No obvious aphasia or dysarthria noted.   Cranial Nerves: PERRL, EOMI, VFFC, sensation V1-V3 intact,  no obvious facial asymmetry , equal elevation of palate, scm/trap 5/5, tongue is midline on protrusion. no obvious papilledema on fundoscopic exam. Hearing is grossly intact.   Motor: Normal bulk, tone and strength throughout upperse. LLE in brace s/p OR. some distal limb movement. RLE 4+/5   Sensation: Intact to light touch and pinprick throughout. no right/left confusion. no extinction to tactile on DSS.   Reflexes: 1+ throughout at biceps, brachioradialis, triceps, patellars and ankles bilaterally and equal. No clonus. R toe and L toe were both downgoing.  Coordination: No dysmetria on FNF    Gait: deferred     I personally reviewed the below data/images/labs:  CBC Full  -  ( 31 Jan 2023 06:10 )  WBC Count : 9.68 K/uL  RBC Count : 2.81 M/uL  Hemoglobin : 8.3 g/dL  Hematocrit : 26.4 %  Platelet Count - Automated : 573 K/uL  Mean Cell Volume : 94.0 fL  Mean Cell Hemoglobin : 29.5 pg  Mean Cell Hemoglobin Concentration : 31.4 gm/dL  Auto Neutrophil # : x  Auto Lymphocyte # : x  Auto Monocyte # : x  Auto Eosinophil # : x  Auto Basophil # : x  Auto Neutrophil % : x  Auto Lymphocyte % : x  Auto Monocyte % : x  Auto Eosinophil % : x  Auto Basophil % : x    01-31    137  |  103  |  12  ----------------------------<  100<H>  4.0   |  25  |  0.69    Ca    8.5      31 Jan 2023 06:10  Phos  4.1     01-31  Mg     1.80     01-31    TPro  6.1  /  Alb  2.7<L>  /  TBili  0.3  /  DBili  x   /  AST  9   /  ALT  16  /  AlkPhos  93  01-31      MRI:   IMPRESSION:    Chronic infarcts of the right frontal subcortical and right centrum   semiovale. No new areas of infarct are identified. Unchanged scattered   foci of hemosiderin.    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   Differential diagnosis includes infection, sarcoidosis, lymphoma and   CLIPPERS (chronic lymphocytic inflammation with pontine perivascular   enhancement unresponsive to steroids).    --- End of Report ---    < end of copied text >  < from: MR Head w/wo IV Cont (12.29.22 @ 13:39) >  IMPRESSION:    Chronic infarcts of the right frontal subcortical and right centrum   semiovale. No new areas of infarct are identified. Unchanged scattered   foci of hemosiderin.    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   Differential diagnosis includes infection, sarcoidosis, lymphoma and   CLIPPERS (chronic lymphocytic inflammation with pontine perivascular   enhancement unresponsive to steroids).    --- End of Report ---    < end of copied text >  < from: MR Cervical Spine w/wo IV Cont (01.24.23 @ 08:43) >    ACC: [id number] EXAM:  MR SPINE CERVICAL WAW IC   ORDERED BY: GABBIE HUTCHINS     ACC: [id number] EXAM:  MR BRAIN WAW IC   ORDERED BY: GABBIE HUTCHINS     PROCEDURE DATE:  01/24/2023          INTERPRETATION:  CLINICAL INFORMATION: Altered mental status.    TECHNIQUE: Multisequence, multiplanar MRI of the brain and cervical spine   was obtained before and after the administration of intravenous contrast.    IV Contrast Administered: 8 cc Gadavist.  IV Contrast Discarded: 2 cc    COMPARISON: MRI brain 12/29/2022    FINDINGS:    MRI BRAIN:    There is no intraparenchymal hematoma, mass effect or midline shift.   There are a few foci of increased signal on T2/FLAIR involving the right   frontal subcortical region and right centrum semiovale, representing the   sequela of known prior infarcts. No new areas of infarct are identified.    Areas of contrast enhancement in the bilateral lentiform nucleus, thalami   and alexis appear less conspicuous/improved compared with prior study. Mild   associated increased T2 signal is unchanged to mildly improved.    No abnormal extra-axial fluid collections are present. There is no   diffusion abnormality to suggest acute or subacute infarction. Major   flow-voids at the base of the brain follow expected course and contour.   No abnormal enhancement on post-contrast images.    Proportional prominence of the sulci and ventricles, within normal limits   for the patient's age.    The calvarium is intact. The visualized intraorbital compartments are  intact. The paranasal sinuses are clear. The tympanomastoid cavities   appear free of acute disease.    MRI CERVICAL SPINE:    There is normal cervical lordosis. The vertebral bodies are of normal   height and configuration. The intervertebral discs spaces are within   normal limits. Right uncinate hypertrophy/right paracentral disc   osteophyte complex involving the C3-C4 level and a disc osteophyte   complex involving the C6-C7 level results in mild central narrowing.   Bilateral uncovertebral degenerative changes at C5-6 results in mild   bilateral neural foramina narrowing. The marrow signal is within normal   limits.  No prevertebral or paraspinal edema.    There is no cord compression or cord impingement. There is no epidural   collection.    There is faint enhancement involving the visualized portions of the alexis   and medulla and superior most cervical cord. Evaluation of the remainder   of the spinal cord demonstrates no evidence of abnormal signal changes or   abnormal enhancement.    IMPRESSION:    Areas of contrast enhancement in the bilateral lentiform nucleus, thalami   and alexis appear less conspicuous / improved compared with prior study.   There is minimal involvement of the upper cervical cord. Differential   includes meningothelial hyperplasia, neurosarcoidosis, lymphoma,   vasculitis and infection. Lower suspicion for CLIPPERS (chronic   lymphocytic inflammation with pontine perivascular enhancement responsive   to steroids) given the distribution.    Chronic infarcts involving the right frontal subcortical region and   right-sided semiovale. No new areas of infarct are identified.    --- End of Report ---       STEPHEN DE JESUS MD; Resident Radiology  This document has been electronically signed.  LEIGHANN JJ MD; Attending Radiologist  This document has been electronically signed. Jan 24 2023  1:32PM    < end of copied text >

## 2023-01-31 NOTE — PROGRESS NOTE ADULT - ASSESSMENT
52-year-old  M  TIAs hx TIAS and strokes initially thought to be 2/2 testosterone with recent admission for seizures and AMS here with confusion. PE with psychomotor slowing, aphasia frontal reflexes bilaterally distal LE weakness here iwth AMS, found to have a DVT iun the LLE MRi on 12/29/22 showed chronic right frontalm centrum semiovale strokes and bilateral lentiform and pontine enhancemeent  -s/p fasciotomy for left leg compartment syndrome 1/18  s/p wash out on 1/23   vEEG neg   MRI brain and C spine done 1/24/23: chronic R frontal and R semiovale infarcts. contrast enhancement imporved.  DDx c/f neurosarcoid, lymphoma, vasculitis, less suspicion for CLIPPERS     Impression: AMS with bilateral pontine and lentiform enhancement ? Clippers vs other; LLE 2/2 compartment syndrome     -LP with extensive CSF studies unrevealing  -Body imaging unrevealing, pelvic LNS not amenable to BX as per IR  - nsx no intervention at this time   - no role for hgih dose steroids   -Would reach out to NSX if amenable to biopsy  -On Keppra 1500 mg BID and Vimpat 100 mg BID  outpatient hypercoag workup neg   - heparin drip. statin held given rise in LFTs; changed to full dose lovenox  dcp    Braxton Forde MD  Vascular Neurology  Office: [phone]

## 2023-01-31 NOTE — PROGRESS NOTE ADULT - SUBJECTIVE AND OBJECTIVE BOX
Name of Patient : TAMI DUNHAM  MRN: [id number]  Date of visit: 23       Subjective: Patient seen and examined. No new events except as noted.   Doing okay       REVIEW OF SYSTEMS:    CONSTITUTIONAL: No weakness, fevers or chills  EYES/ENT: No visual changes;  No vertigo or throat pain   NECK: No pain or stiffness  RESPIRATORY: No cough, wheezing, hemoptysis; No shortness of breath  CARDIOVASCULAR: No chest pain or palpitations  GASTROINTESTINAL: No abdominal or epigastric pain. No nausea, vomiting, or hematemesis; No diarrhea or constipation. No melena or hematochezia.  GENITOURINARY: No dysuria, frequency or hematuria  NEUROLOGICAL: No numbness or weakness  SKIN: No itching, burning, rashes, or lesions   All other review of systems is negative unless indicated above.    MEDICATIONS:  MEDICATIONS  (STANDING):  aspirin enteric coated 81 milliGRAM(s) Oral daily  cefTRIAXone   IVPB 1000 milliGRAM(s) IV Intermittent every 24 hours  chlorhexidine 2% Cloths 1 Application(s) Topical daily  enoxaparin Injectable 80 milliGRAM(s) SubCutaneous every 12 hours  lacosamide IVPB 100 milliGRAM(s) IV Intermittent every 12 hours  levETIRAcetam  IVPB 1500 milliGRAM(s) IV Intermittent every 12 hours  lidocaine   4% Patch 1 Patch Transdermal daily  multivitamin 1 Tablet(s) Oral daily  mupirocin 2% Ointment 1 Application(s) Topical two times a day  pantoprazole  Injectable 40 milliGRAM(s) IV Push two times a day  polyethylene glycol 3350 17 Gram(s) Oral two times a day  senna 2 Tablet(s) Oral at bedtime  sodium chloride 0.9%. 1000 milliLiter(s) (75 mL/Hr) IV Continuous <Continuous>      PHYSICAL EXAM:  T(C): 36.8 (23 @ 19:50), Max: 37.3 (23 @ 05:03)  HR: 92 (23 @ 19:50) (80 - 92)  BP: 107/72 (23 @ 19:50) (106/61 - 133/81)  RR: 17 (23 @ 19:50) (17 - 18)  SpO2: 99% (23 @ 19:50) (99% - 100%)  Wt(kg): --  I&O's Summary    2023 07:01  -  2023 22:12  --------------------------------------------------------  IN: 0 mL / OUT: 1050 mL / NET: -1050 mL          Appearance: Normal	  HEENT:  PERRLA   Lymphatic: No lymphadenopathy   Cardiovascular: Normal S1 S2, no JVD  Respiratory: normal effort , clear  Gastrointestinal:  Soft, Non-tender  Skin: No rashes,  warm to touch  Psychiatry:  Mood & affect appropriate  Musculuskeletal: No edema    recent labs, Imaging and EKGs personally reviewed     23 @ 07:  -  23 @ 22:12  --------------------------------------------------------  IN: 0 mL / OUT: 1050 mL / NET: -1050 mL                          8.3    9.68  )-----------( 573      ( 2023 06:10 )             26.4                   137  |  103  |  12  ----------------------------<  100<H>  4.0   |  25  |  0.69    Ca    8.5      2023 06:10  Phos  4.1       Mg     1.80         TPro  6.1  /  Alb  2.7<L>  /  TBili  0.3  /  DBili  x   /  AST  9   /  ALT  16  /  AlkPhos  93                         Urinalysis Basic - ( 2023 18:38 )    Color: Light Yellow / Appearance: Slightly Turbid / S.010 / pH: x  Gluc: x / Ketone: Negative  / Bili: Negative / Urobili: <2 mg/dL   Blood: x / Protein: 100 mg/dL / Nitrite: Negative   Leuk Esterase: Large / RBC: 10 /HPF / WBC 86 /HPF   Sq Epi: x / Non Sq Epi: 8 /HPF / Bacteria: Occasional

## 2023-01-31 NOTE — PROGRESS NOTE ADULT - SUBJECTIVE AND OBJECTIVE BOX
Patient is a 52y old  Male who presents with a chief complaint of Altered mental status     (25 Jan 2023 14:31)    Interval history : on schedule for PT today, nurse at bedside, to give pain medications prior to PT. Patient reports fear of L foot pain.    REVIEW OF SYSTEMS  pain    PAST MEDICAL & SURGICAL HISTORY  History of TIAs    CVA (cerebrovascular accident)    HLD (hyperlipidemia)    Vertigo    Unresponsiveness    No significant past surgical history       CURRENT FUNCTIONAL STATUS  follow up pending    FAMILY HISTORY   No pertinent family history in first degree relatives         RECENT LABS/IMAGING  CBC Full  -  ( 31 Jan 2023 06:10 )  WBC Count : 9.68 K/uL  RBC Count : 2.81 M/uL  Hemoglobin : 8.3 g/dL  Hematocrit : 26.4 %  Platelet Count - Automated : 573 K/uL  Mean Cell Volume : 94.0 fL  Mean Cell Hemoglobin : 29.5 pg  Mean Cell Hemoglobin Concentration : 31.4 gm/dL  Auto Neutrophil # : x  Auto Lymphocyte # : x  Auto Monocyte # : x  Auto Eosinophil # : x  Auto Basophil # : x  Auto Neutrophil % : x  Auto Lymphocyte % : x  Auto Monocyte % : x  Auto Eosinophil % : x  Auto Basophil % : x    01-31    137  |  103  |  12  ----------------------------<  100<H>  4.0   |  25  |  0.69    Ca    8.5      31 Jan 2023 06:10  Phos  4.1     01-31  Mg     1.80     01-31    TPro  6.1  /  Alb  2.7<L>  /  TBili  0.3  /  DBili  x   /  AST  9   /  ALT  16  /  AlkPhos  93  01-31        VITALS  T(C): 36.8 (01-31-23 @ 12:10), Max: 37.3 (01-31-23 @ 05:03)  HR: 81 (01-31-23 @ 12:10) (80 - 85)  BP: 106/61 (01-31-23 @ 12:10) (106/61 - 110/71)  RR: 17 (01-31-23 @ 12:10) (17 - 17)  SpO2: 100% (01-31-23 @ 12:10) (99% - 100%)  Wt(kg): --    ALLERGIES  No Known Allergies      MEDICATIONS   aspirin enteric coated 81 milliGRAM(s) Oral daily  chlorhexidine 2% Cloths 1 Application(s) Topical daily  enoxaparin Injectable 80 milliGRAM(s) SubCutaneous every 12 hours  HYDROmorphone  Injectable 1 milliGRAM(s) IV Push every 6 hours PRN  lacosamide IVPB 100 milliGRAM(s) IV Intermittent every 12 hours  levETIRAcetam  IVPB 1500 milliGRAM(s) IV Intermittent every 12 hours  lidocaine   4% Patch 1 Patch Transdermal daily  multivitamin 1 Tablet(s) Oral daily  mupirocin 2% Ointment 1 Application(s) Topical two times a day  oxyCODONE    IR 5 milliGRAM(s) Oral every 6 hours PRN  pantoprazole  Injectable 40 milliGRAM(s) IV Push two times a day  polyethylene glycol 3350 17 Gram(s) Oral two times a day  senna 2 Tablet(s) Oral at bedtime  sodium chloride 0.9%. 1000 milliLiter(s) IV Continuous <Continuous>      ----------------------------------------------------------------------------------------   PHYSICAL EXAM  Constitutional - NAD, Comfortable  HEENT - NCAT, EOMI   Chest -no respiratory distress  Cardiovascular - RRR, S1S2   Abdomen - Soft, NTND  Extremities -LLE wound vac   Neurologic Exam -                    Cognitive - Awake, Alert, AAO to self, place, date, year, situation     Communication - Fluent, No dysarthria     Cranial Nerves - CN 2-12 intact     Motor -                     LEFT    UE - ShAB 5/5, EF 5/5, EE 5/5, WE 5/5,  5/5                    RIGHT UE - ShAB 5/5, EF 5/5, EE 5/5, WE 5/5,  5/5                    LEFT    LE - HF 4+/5                     RIGHT LE - HF 5/5, KE 5/5, DF 5/5, PF 5/5        Sensory - impaired L foot        Balance - WNL Static  Psychiatric - Mood stable, Affect WNL  ----------------------------------------------------------------------------------------  ASSESSMENT/PLAN  53 yo m pmh cva, hld, ? seizure rehab for change in mental status and change in speech as well as L calf pain.  found to have compartment syndrome, now s/p fasciotomy  wound vac  -also with LLE dvt  -tubular adenoma on colonoscopy/biopsy, recommended for outpatient follow up, possible additional biopsy  planning for closure per plastic surgery  Pain -iv dilaudid prn, oxy ir prn, lidocaine patch  diet: regular dash tlc  DVT PPX - lovenox  Rehab -  recommend subacute rehab when medically cleared.      will monitor for improvement

## 2023-02-01 LAB
CULTURE RESULTS: SIGNIFICANT CHANGE UP
HCT VFR BLD CALC: 27.3 % — LOW (ref 39–50)
HGB BLD-MCNC: 8.4 G/DL — LOW (ref 13–17)
MCHC RBC-ENTMCNC: 29.2 PG — SIGNIFICANT CHANGE UP (ref 27–34)
MCHC RBC-ENTMCNC: 30.8 GM/DL — LOW (ref 32–36)
MCV RBC AUTO: 94.8 FL — SIGNIFICANT CHANGE UP (ref 80–100)
NRBC # BLD: 0 /100 WBCS — SIGNIFICANT CHANGE UP (ref 0–0)
NRBC # FLD: 0 K/UL — SIGNIFICANT CHANGE UP (ref 0–0)
PLATELET # BLD AUTO: 579 K/UL — HIGH (ref 150–400)
RBC # BLD: 2.88 M/UL — LOW (ref 4.2–5.8)
RBC # FLD: 14.9 % — HIGH (ref 10.3–14.5)
SPECIMEN SOURCE: SIGNIFICANT CHANGE UP
WBC # BLD: 9.94 K/UL — SIGNIFICANT CHANGE UP (ref 3.8–10.5)
WBC # FLD AUTO: 9.94 K/UL — SIGNIFICANT CHANGE UP (ref 3.8–10.5)

## 2023-02-01 RX ADMIN — MUPIROCIN 1 APPLICATION(S): 20 OINTMENT TOPICAL at 18:06

## 2023-02-01 RX ADMIN — CEFTRIAXONE 100 MILLIGRAM(S): 500 INJECTION, POWDER, FOR SOLUTION INTRAMUSCULAR; INTRAVENOUS at 21:20

## 2023-02-01 RX ADMIN — HYDROMORPHONE HYDROCHLORIDE 1 MILLIGRAM(S): 2 INJECTION INTRAMUSCULAR; INTRAVENOUS; SUBCUTANEOUS at 09:49

## 2023-02-01 RX ADMIN — LACOSAMIDE 120 MILLIGRAM(S): 50 TABLET ORAL at 18:05

## 2023-02-01 RX ADMIN — LIDOCAINE 1 PATCH: 4 CREAM TOPICAL at 06:48

## 2023-02-01 RX ADMIN — Medication 81 MILLIGRAM(S): at 14:01

## 2023-02-01 RX ADMIN — ENOXAPARIN SODIUM 80 MILLIGRAM(S): 100 INJECTION SUBCUTANEOUS at 05:17

## 2023-02-01 RX ADMIN — LEVETIRACETAM 400 MILLIGRAM(S): 250 TABLET, FILM COATED ORAL at 06:34

## 2023-02-01 RX ADMIN — Medication 1 TABLET(S): at 14:01

## 2023-02-01 RX ADMIN — PANTOPRAZOLE SODIUM 40 MILLIGRAM(S): 20 TABLET, DELAYED RELEASE ORAL at 18:06

## 2023-02-01 RX ADMIN — LEVETIRACETAM 400 MILLIGRAM(S): 250 TABLET, FILM COATED ORAL at 18:05

## 2023-02-01 RX ADMIN — LIDOCAINE 1 PATCH: 4 CREAM TOPICAL at 14:00

## 2023-02-01 RX ADMIN — SENNA PLUS 2 TABLET(S): 8.6 TABLET ORAL at 21:21

## 2023-02-01 RX ADMIN — SODIUM CHLORIDE 75 MILLILITER(S): 9 INJECTION INTRAMUSCULAR; INTRAVENOUS; SUBCUTANEOUS at 05:20

## 2023-02-01 RX ADMIN — CHLORHEXIDINE GLUCONATE 1 APPLICATION(S): 213 SOLUTION TOPICAL at 14:00

## 2023-02-01 RX ADMIN — OXYCODONE HYDROCHLORIDE 5 MILLIGRAM(S): 5 TABLET ORAL at 14:00

## 2023-02-01 RX ADMIN — POLYETHYLENE GLYCOL 3350 17 GRAM(S): 17 POWDER, FOR SOLUTION ORAL at 21:20

## 2023-02-01 RX ADMIN — PANTOPRAZOLE SODIUM 40 MILLIGRAM(S): 20 TABLET, DELAYED RELEASE ORAL at 05:24

## 2023-02-01 RX ADMIN — LACOSAMIDE 120 MILLIGRAM(S): 50 TABLET ORAL at 05:16

## 2023-02-01 RX ADMIN — ENOXAPARIN SODIUM 80 MILLIGRAM(S): 100 INJECTION SUBCUTANEOUS at 18:06

## 2023-02-01 RX ADMIN — POLYETHYLENE GLYCOL 3350 17 GRAM(S): 17 POWDER, FOR SOLUTION ORAL at 05:16

## 2023-02-01 RX ADMIN — MUPIROCIN 1 APPLICATION(S): 20 OINTMENT TOPICAL at 05:25

## 2023-02-01 NOTE — PROGRESS NOTE ADULT - SUBJECTIVE AND OBJECTIVE BOX
Name of Patient : TAMI DUNHAM  MRN: [id number]  Date of visit: 23       Subjective: Patient seen and examined. No new events except as noted.   Doing okay     REVIEW OF SYSTEMS:    CONSTITUTIONAL: No weakness, fevers or chills  EYES/ENT: No visual changes;  No vertigo or throat pain   NECK: No pain or stiffness  RESPIRATORY: No cough, wheezing, hemoptysis; No shortness of breath  CARDIOVASCULAR: No chest pain or palpitations  GASTROINTESTINAL: No abdominal or epigastric pain. No nausea, vomiting, or hematemesis; No diarrhea or constipation. No melena or hematochezia.  GENITOURINARY: No dysuria, frequency or hematuria  NEUROLOGICAL: No numbness or weakness  SKIN: No itching, burning, rashes, or lesions   All other review of systems is negative unless indicated above.    MEDICATIONS:  MEDICATIONS  (STANDING):  aspirin enteric coated 81 milliGRAM(s) Oral daily  cefTRIAXone   IVPB 1000 milliGRAM(s) IV Intermittent every 24 hours  chlorhexidine 2% Cloths 1 Application(s) Topical daily  enoxaparin Injectable 80 milliGRAM(s) SubCutaneous every 12 hours  lacosamide IVPB 100 milliGRAM(s) IV Intermittent every 12 hours  levETIRAcetam  IVPB 1500 milliGRAM(s) IV Intermittent every 12 hours  lidocaine   4% Patch 1 Patch Transdermal daily  multivitamin 1 Tablet(s) Oral daily  mupirocin 2% Ointment 1 Application(s) Topical two times a day  pantoprazole  Injectable 40 milliGRAM(s) IV Push two times a day  polyethylene glycol 3350 17 Gram(s) Oral two times a day  senna 2 Tablet(s) Oral at bedtime  sodium chloride 0.9%. 1000 milliLiter(s) (75 mL/Hr) IV Continuous <Continuous>      PHYSICAL EXAM:  T(C): 37 (23 @ 12:09), Max: 37 (23 @ 12:09)  HR: 81 (23 @ 12:09) (80 - 92)  BP: 104/69 (23 @ 12:09) (104/69 - 130/76)  RR: 17 (23 @ 12:09) (16 - 17)  SpO2: 100% (23 @ 12:09) (98% - 100%)  Wt(kg): --  I&O's Summary    2023 07:01  -  2023 07:00  --------------------------------------------------------  IN: 0 mL / OUT: 3120 mL / NET: -3120 mL          Appearance: Normal	  HEENT:  PERRLA   Lymphatic: No lymphadenopathy   Cardiovascular: Normal S1 S2, no JVD  Respiratory: normal effort , clear  Gastrointestinal:  Soft, Non-tender  Skin: No rashes,  warm to touch  Psychiatry:  Mood & affect appropriate  Musculuskeletal: No edema    recent labs, Imaging and EKGs personally reviewed     23 @ 07:01  -  23 @ 07:00  --------------------------------------------------------  IN: 0 mL / OUT: 3120 mL / NET: -3120 mL                          8.4    9.94  )-----------( 579      ( 2023 05:14 )             27.3                   137  |  103  |  12  ----------------------------<  100<H>  4.0   |  25  |  0.69    Ca    8.5      2023 06:10  Phos  4.1       Mg     1.80         TPro  6.1  /  Alb  2.7<L>  /  TBili  0.3  /  DBili  x   /  AST  9   /  ALT  16  /  AlkPhos  93                         Urinalysis Basic - ( 2023 18:38 )    Color: Light Yellow / Appearance: Slightly Turbid / S.010 / pH: x  Gluc: x / Ketone: Negative  / Bili: Negative / Urobili: <2 mg/dL   Blood: x / Protein: 100 mg/dL / Nitrite: Negative   Leuk Esterase: Large / RBC: 10 /HPF / WBC 86 /HPF   Sq Epi: x / Non Sq Epi: 8 /HPF / Bacteria: Occasional

## 2023-02-01 NOTE — PROGRESS NOTE ADULT - ASSESSMENT
Patient is a 52-year-old male with a past medical history of CVA/TIA, ASD/PFO?, hyperlipidemia presents emerged department today with altered mental status from his nursing facility.  Patient just had a stay in this hospital when he was found unresponsive at home.  An extensive work-up with no significant etiology found.  There were thoughts that it was related to seizure activity however neurology did not find any seizure activity on their work-up.  Patient went to a nursing facility and rehab.  Was doing well until yesterday his parents noted him to be somewhat confused and having word finding difficulties.  Today his speech was worse when noticed by the wife.  He is also noted to have odd behavior where he was pulling at sheets and the strings of the masks.  He did not have this behavior before.  Only residual deficit from his previous strokes with some random word finding abilities however his speech today is reported to be significantly worse with worse word finding.  The patient denies any pain at this time.  He does acknowledge that he is having trouble speaking.  There is also report from the family and patient is complaining of left calf pain.  This pain is much more severe than it has been in the past.    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, SP LP, no malignancy on cyto   tele monitoring   brain MRI per Neuro recs, noted, follow up neuro recs , discussed with neurosurg, no plan for biopsy at this time     # Lekucytosis  Pan CX   Infectious W/U   CTA negative   ID eval appreciated    Hematology eval appreciated, follow up recs     #  Compartment syndrome   S/P Fasciotomy   Vascular follow up appreciated   dressing per vascular follow up recs   ID eval appreciated, cont zosyn, total of 7 days   as per vascualr, no need for BKLA>alive muscle, wound care  plastic surg eval called for possible flap , appreciated recs, wound vac care   resume AC , tolerating         # Elevated LFTs  trending up  hold tyleniol and statin  trend for now  if cont to trend up, will obtain US   likely due to acute anemia       # ANemia  Occult negative  Total of 3 units PRBC  serial CBC   GI follow up appreciated

## 2023-02-01 NOTE — PROGRESS NOTE ADULT - SUBJECTIVE AND OBJECTIVE BOX
Plastic Surgery Progress Note (pg [facility]: [id number], [facility]: [phone])    SUBJECTIVE  The patient was seen and examined.     OBJECTIVE  ___________________________________________________  VITAL SIGNS / I&O's   Vital Signs Last 24 Hrs  T(C): 36.8 (2023 04:58), Max: 36.8 (2023 12:10)  T(F): 98.3 (2023 04:58), Max: 98.3 (2023 12:10)  HR: 80 (2023 09:48) (80 - 92)  BP: 130/76 (2023 09:48) (106/61 - 133/81)  BP(mean): 93 (2023 18:00) (72 - 93)  RR: 16 (2023 09:48) (16 - 18)  SpO2: 99% (2023 09:48) (98% - 100%)    Parameters below as of 2023 09:48  Patient On (Oxygen Delivery Method): room air          2023 07:01  -  2023 07:00  --------------------------------------------------------  IN:  Total IN: 0 mL    OUT:    VAC (Vacuum Assisted Closure) System (mL): 220 mL    Voided (mL): 2900 mL  Total OUT: 3120 mL    Total NET: -3120 mL        ___________________________________________________  PHYSICAL EXAM    Gen: resting in bed comfortably in NAD  Chest: no increased WOB, regular inspiratory effort   Abdomen: Soft, nontender, nondistended with no rebound tenderness or guarding.   Ext: vac placed on both medial and lateral wounds holding suction with scant s/s output  Neuro: awake, alert    ___________________________________________________  LABS                        8.4    9.94  )-----------( 579      ( 2023 05:14 )             27.3     2023 06:10    137    |  103    |  12     ----------------------------<  100    4.0     |  25     |  0.69     Ca    8.5        2023 06:10  Phos  4.1       2023 06:10  Mg     1.80      2023 06:10    TPro  6.1    /  Alb  2.7    /  TBili  0.3    /  DBili  x      /  AST  9      /  ALT  16     /  AlkPhos  93     2023 06:10      CAPILLARY BLOOD GLUCOSE            Urinalysis Basic - ( 2023 18:38 )    Color: Light Yellow / Appearance: Slightly Turbid / S.010 / pH: x  Gluc: x / Ketone: Negative  / Bili: Negative / Urobili: <2 mg/dL   Blood: x / Protein: 100 mg/dL / Nitrite: Negative   Leuk Esterase: Large / RBC: 10 /HPF / WBC 86 /HPF   Sq Epi: x / Non Sq Epi: 8 /HPF / Bacteria: Occasional      ___________________________________________________  MICRO  Recent Cultures:    ___________________________________________________  MEDICATIONS  (STANDING):  aspirin enteric coated 81 milliGRAM(s) Oral daily  cefTRIAXone   IVPB 1000 milliGRAM(s) IV Intermittent every 24 hours  chlorhexidine 2% Cloths 1 Application(s) Topical daily  enoxaparin Injectable 80 milliGRAM(s) SubCutaneous every 12 hours  lacosamide IVPB 100 milliGRAM(s) IV Intermittent every 12 hours  levETIRAcetam  IVPB 1500 milliGRAM(s) IV Intermittent every 12 hours  lidocaine   4% Patch 1 Patch Transdermal daily  multivitamin 1 Tablet(s) Oral daily  mupirocin 2% Ointment 1 Application(s) Topical two times a day  pantoprazole  Injectable 40 milliGRAM(s) IV Push two times a day  polyethylene glycol 3350 17 Gram(s) Oral two times a day  senna 2 Tablet(s) Oral at bedtime  sodium chloride 0.9%. 1000 milliLiter(s) (75 mL/Hr) IV Continuous <Continuous>    MEDICATIONS  (PRN):  HYDROmorphone  Injectable 1 milliGRAM(s) IV Push every 6 hours PRN severe breakthrough pain  oxyCODONE    IR 5 milliGRAM(s) Oral every 6 hours PRN Severe Pain (7 - 10)          Assessment & Plan

## 2023-02-01 NOTE — PROGRESS NOTE ADULT - ASSESSMENT
Patient is a 52-year-old male with a past medical history of CVA/TIA, ASD/PFO?, hyperlipidemia s/p  left lower extremity fasciotomies due to compartment syndrome on 1/18.  Pt had recent washout of wounds two days ago.  Plastic surgery consulted for possible closure/skin graft.      PLAN:  - s/p left lower ext fasciotomies  - cont wound dressings for now  - vac changed today and placed today on medial and lateral compartments  - Patient ok to be d/brina from PRS perspective with home vac in place  - Patient needs to follow-up with Dr. Dow outpatient for possible closure vs skin graft    Shari Rios  Plastic & Reconstructive Surgery, PGY-1  #[id number]

## 2023-02-02 LAB
HCT VFR BLD CALC: 29.3 % — LOW (ref 39–50)
HGB BLD-MCNC: 8.9 G/DL — LOW (ref 13–17)
MCHC RBC-ENTMCNC: 28.9 PG — SIGNIFICANT CHANGE UP (ref 27–34)
MCHC RBC-ENTMCNC: 30.4 GM/DL — LOW (ref 32–36)
MCV RBC AUTO: 95.1 FL — SIGNIFICANT CHANGE UP (ref 80–100)
NRBC # BLD: 0 /100 WBCS — SIGNIFICANT CHANGE UP (ref 0–0)
NRBC # FLD: 0 K/UL — SIGNIFICANT CHANGE UP (ref 0–0)
PLATELET # BLD AUTO: 527 K/UL — HIGH (ref 150–400)
RBC # BLD: 3.08 M/UL — LOW (ref 4.2–5.8)
RBC # FLD: 14.9 % — HIGH (ref 10.3–14.5)
WBC # BLD: 7.81 K/UL — SIGNIFICANT CHANGE UP (ref 3.8–10.5)
WBC # FLD AUTO: 7.81 K/UL — SIGNIFICANT CHANGE UP (ref 3.8–10.5)

## 2023-02-02 PROCEDURE — 99232 SBSQ HOSP IP/OBS MODERATE 35: CPT

## 2023-02-02 RX ORDER — ATORVASTATIN CALCIUM 80 MG/1
40 TABLET, FILM COATED ORAL AT BEDTIME
Refills: 0 | Status: DISCONTINUED | OUTPATIENT
Start: 2023-02-02 | End: 2023-02-06

## 2023-02-02 RX ADMIN — CEFTRIAXONE 100 MILLIGRAM(S): 500 INJECTION, POWDER, FOR SOLUTION INTRAMUSCULAR; INTRAVENOUS at 21:42

## 2023-02-02 RX ADMIN — PANTOPRAZOLE SODIUM 40 MILLIGRAM(S): 20 TABLET, DELAYED RELEASE ORAL at 06:09

## 2023-02-02 RX ADMIN — LEVETIRACETAM 400 MILLIGRAM(S): 250 TABLET, FILM COATED ORAL at 18:31

## 2023-02-02 RX ADMIN — ENOXAPARIN SODIUM 80 MILLIGRAM(S): 100 INJECTION SUBCUTANEOUS at 06:09

## 2023-02-02 RX ADMIN — LACOSAMIDE 120 MILLIGRAM(S): 50 TABLET ORAL at 17:44

## 2023-02-02 RX ADMIN — OXYCODONE HYDROCHLORIDE 5 MILLIGRAM(S): 5 TABLET ORAL at 21:44

## 2023-02-02 RX ADMIN — LIDOCAINE 1 PATCH: 4 CREAM TOPICAL at 11:48

## 2023-02-02 RX ADMIN — PANTOPRAZOLE SODIUM 40 MILLIGRAM(S): 20 TABLET, DELAYED RELEASE ORAL at 17:47

## 2023-02-02 RX ADMIN — POLYETHYLENE GLYCOL 3350 17 GRAM(S): 17 POWDER, FOR SOLUTION ORAL at 17:48

## 2023-02-02 RX ADMIN — CHLORHEXIDINE GLUCONATE 1 APPLICATION(S): 213 SOLUTION TOPICAL at 17:48

## 2023-02-02 RX ADMIN — OXYCODONE HYDROCHLORIDE 5 MILLIGRAM(S): 5 TABLET ORAL at 16:14

## 2023-02-02 RX ADMIN — OXYCODONE HYDROCHLORIDE 5 MILLIGRAM(S): 5 TABLET ORAL at 15:14

## 2023-02-02 RX ADMIN — POLYETHYLENE GLYCOL 3350 17 GRAM(S): 17 POWDER, FOR SOLUTION ORAL at 06:09

## 2023-02-02 RX ADMIN — MUPIROCIN 1 APPLICATION(S): 20 OINTMENT TOPICAL at 06:09

## 2023-02-02 RX ADMIN — Medication 81 MILLIGRAM(S): at 11:47

## 2023-02-02 RX ADMIN — ENOXAPARIN SODIUM 80 MILLIGRAM(S): 100 INJECTION SUBCUTANEOUS at 17:47

## 2023-02-02 RX ADMIN — ATORVASTATIN CALCIUM 40 MILLIGRAM(S): 80 TABLET, FILM COATED ORAL at 21:38

## 2023-02-02 RX ADMIN — LIDOCAINE 1 PATCH: 4 CREAM TOPICAL at 02:03

## 2023-02-02 RX ADMIN — LEVETIRACETAM 400 MILLIGRAM(S): 250 TABLET, FILM COATED ORAL at 06:08

## 2023-02-02 RX ADMIN — Medication 1 TABLET(S): at 11:47

## 2023-02-02 RX ADMIN — LACOSAMIDE 120 MILLIGRAM(S): 50 TABLET ORAL at 06:08

## 2023-02-02 RX ADMIN — MUPIROCIN 1 APPLICATION(S): 20 OINTMENT TOPICAL at 17:49

## 2023-02-02 RX ADMIN — SENNA PLUS 2 TABLET(S): 8.6 TABLET ORAL at 21:36

## 2023-02-02 NOTE — PROGRESS NOTE ADULT - SUBJECTIVE AND OBJECTIVE BOX
Name of Patient : TAMI DUNHAM  MRN: [id number]  Date of visit: 02-02-23       Subjective: Patient seen and examined. No new events except as noted.   Doing okay     REVIEW OF SYSTEMS:    CONSTITUTIONAL: No weakness, fevers or chills  EYES/ENT: No visual changes;  No vertigo or throat pain   NECK: No pain or stiffness  RESPIRATORY: No cough, wheezing, hemoptysis; No shortness of breath  CARDIOVASCULAR: No chest pain or palpitations  GASTROINTESTINAL: No abdominal or epigastric pain. No nausea, vomiting, or hematemesis; No diarrhea or constipation. No melena or hematochezia.  GENITOURINARY: No dysuria, frequency or hematuria  NEUROLOGICAL: No numbness or weakness  SKIN: No itching, burning, rashes, or lesions   All other review of systems is negative unless indicated above.    MEDICATIONS:  MEDICATIONS  (STANDING):  aspirin enteric coated 81 milliGRAM(s) Oral daily  cefTRIAXone   IVPB 1000 milliGRAM(s) IV Intermittent every 24 hours  chlorhexidine 2% Cloths 1 Application(s) Topical daily  enoxaparin Injectable 80 milliGRAM(s) SubCutaneous every 12 hours  lacosamide IVPB 100 milliGRAM(s) IV Intermittent every 12 hours  levETIRAcetam  IVPB 1500 milliGRAM(s) IV Intermittent every 12 hours  lidocaine   4% Patch 1 Patch Transdermal daily  multivitamin 1 Tablet(s) Oral daily  mupirocin 2% Ointment 1 Application(s) Topical two times a day  pantoprazole  Injectable 40 milliGRAM(s) IV Push two times a day  polyethylene glycol 3350 17 Gram(s) Oral two times a day  senna 2 Tablet(s) Oral at bedtime  sodium chloride 0.9%. 1000 milliLiter(s) (75 mL/Hr) IV Continuous <Continuous>      PHYSICAL EXAM:  T(C): 36.4 (02-02-23 @ 20:19), Max: 36.9 (02-02-23 @ 05:01)  HR: 90 (02-02-23 @ 20:19) (81 - 93)  BP: 110/76 (02-02-23 @ 20:19) (110/71 - 114/74)  RR: 18 (02-02-23 @ 20:19) (17 - 18)  SpO2: 99% (02-02-23 @ 20:19) (98% - 100%)  Wt(kg): --  I&O's Summary    01 Feb 2023 07:01  -  02 Feb 2023 07:00  --------------------------------------------------------  IN: 50 mL / OUT: 845 mL / NET: -795 mL          Appearance: Normal	  HEENT:  PERRLA   Lymphatic: No lymphadenopathy   Cardiovascular: Normal S1 S2, no JVD  Respiratory: normal effort , clear  Gastrointestinal:  Soft, Non-tender  Skin: No rashes,  warm to touch  Psychiatry:  Mood & affect appropriate  Musculuskeletal: No edema    recent labs, Imaging and EKGs personally reviewed     02-01-23 @ 07:01  -  02-02-23 @ 07:00  --------------------------------------------------------  IN: 50 mL / OUT: 845 mL / NET: -795 mL                          8.9    7.81  )-----------( 527      ( 02 Feb 2023 06:11 )             29.3

## 2023-02-02 NOTE — PROGRESS NOTE ADULT - SUBJECTIVE AND OBJECTIVE BOX
Patient is a 52y old  Male who presents with a chief complaint of Altered mental status     (2023 14:31)      HPI:  Patient is a 52-year-old male with a past medical history of CVA/TIA, ASD/PFO?, hyperlipidemia presents emerged department today with altered mental status from his nursing facility.  Patient just had a stay in this hospital when he was found unresponsive at home.  An extensive work-up with no significant etiology found.  There were thoughts that it was related to seizure activity however neurology did not find any seizure activity on their work-up.  Patient went to a nursing facility and rehab.  Was doing well until yesterday his parents noted him to be somewhat confused and having word finding difficulties.  Today his speech was worse when noticed by the wife.  He is also noted to have odd behavior where he was pulling at sheets and the strings of the masks.  He did not have this behavior before.  Only residual deficit from his previous strokes with some random word finding abilities however his speech today is reported to be significantly worse with worse word finding.  The patient denies any pain at this time.  He does acknowledge that he is having trouble speaking.  There is also report from the family and patient is complaining of left calf pain.  This pain is much more severe than it has been in the past. (2023 10:06)    reports he had dysuria during admission which improved with treatment  wound vac changed yesterday.   plan pending for possible closure vs skin graft    REVIEW OF SYSTEMS  pain  impaired sensation    PAST MEDICAL & SURGICAL HISTORY  History of TIAs    CVA (cerebrovascular accident)    HLD (hyperlipidemia)    Vertigo    Unresponsiveness    No significant past surgical history       CURRENT FUNCTIONAL STATUS  2/2     Bed Mobility  Bed Mobility Training Rehab Potential: good, to achieve stated therapy goals  Bed Mobility Training Rolling/Turning: contact guard;  1 person assist;  nonverbal cues (demo/gestures);  verbal cues  Bed Mobility Training Sit-to-Supine: contact guard;  1 person assist;  nonverbal cues (demo/gestures);  verbal cues;  long sitting   Bed Mobility Training Supine-to-Sit: contact guard;  1 person assist;  nonverbal cues (demo/gestures);  verbal cues;  long sitting   Bed Mobility Training Limitations: impaired ability to control trunk for mobility;  decreased ability to use legs for bridging/pushing;  decreased ability to use arms for pushing/pulling;  decreased strength;  impaired balance    Sit-Stand Transfer Training  Sit-to-Stand Transfer Training Treatment not Performed: unable to perform at this time secondary to pt. reporting left leg weakness, will attempt as feasible     Therapeutic Exercise  Therapeutic Exercise Detail: Pt. participated in seated/supine therapeutic exercises 2 x10 throughout bilateral UE/LE.         FAMILY HISTORY   No pertinent family history in first degree relatives       RECENT LABS/IMAGING  CBC Full  -  ( 2023 06:11 )  WBC Count : 7.81 K/uL  RBC Count : 3.08 M/uL  Hemoglobin : 8.9 g/dL  Hematocrit : 29.3 %  Platelet Count - Automated : 527 K/uL  Mean Cell Volume : 95.1 fL  Mean Cell Hemoglobin : 28.9 pg  Mean Cell Hemoglobin Concentration : 30.4 gm/dL  Auto Neutrophil # : x  Auto Lymphocyte # : x  Auto Monocyte # : x  Auto Eosinophil # : x  Auto Basophil # : x  Auto Neutrophil % : x  Auto Lymphocyte % : x  Auto Monocyte % : x  Auto Eosinophil % : x  Auto Basophil % : x          Urinalysis Basic - ( 2023 18:38 )    Color: Light Yellow / Appearance: Slightly Turbid / S.010 / pH: x  Gluc: x / Ketone: Negative  / Bili: Negative / Urobili: <2 mg/dL   Blood: x / Protein: 100 mg/dL / Nitrite: Negative   Leuk Esterase: Large / RBC: 10 /HPF / WBC 86 /HPF   Sq Epi: x / Non Sq Epi: 8 /HPF / Bacteria: Occasional        VITALS  T(C): 36.9 (23 @ 05:01), Max: 37 (23 @ 12:09)  HR: 81 (23 @ 05:01) (81 - 86)  BP: 114/74 (23 @ 05:01) (104/69 - 114/74)  RR: 17 (23 @ 05:01) (16 - 17)  SpO2: 100% (23 @ 05:01) (98% - 100%)  Wt(kg): --    ALLERGIES  No Known Allergies      MEDICATIONS   aspirin enteric coated 81 milliGRAM(s) Oral daily  cefTRIAXone   IVPB 1000 milliGRAM(s) IV Intermittent every 24 hours  chlorhexidine 2% Cloths 1 Application(s) Topical daily  enoxaparin Injectable 80 milliGRAM(s) SubCutaneous every 12 hours  HYDROmorphone  Injectable 1 milliGRAM(s) IV Push every 6 hours PRN  lacosamide IVPB 100 milliGRAM(s) IV Intermittent every 12 hours  levETIRAcetam  IVPB 1500 milliGRAM(s) IV Intermittent every 12 hours  lidocaine   4% Patch 1 Patch Transdermal daily  multivitamin 1 Tablet(s) Oral daily  mupirocin 2% Ointment 1 Application(s) Topical two times a day  oxyCODONE    IR 5 milliGRAM(s) Oral every 6 hours PRN  pantoprazole  Injectable 40 milliGRAM(s) IV Push two times a day  polyethylene glycol 3350 17 Gram(s) Oral two times a day  senna 2 Tablet(s) Oral at bedtime  sodium chloride 0.9%. 1000 milliLiter(s) IV Continuous <Continuous>      ----------------------------------------------------------------------------------------  PHYSICAL EXAM  Constitutional - NAD, Comfortable  HEENT - NCAT, EOMI   Chest -no respiratory distress  Cardiovascular - RRR, S1S2   Abdomen - Soft, NTND  Extremities -LLE wound vac, dressings in place  Neurologic Exam -                    Cognitive - Awake, Alert, AAO to self, place, date, year, situation     Communication - Fluent, No dysarthria     Cranial Nerves - CN 2-12 intact     Motor -                     LEFT    UE - ShAB 5/5, EF 5/5, EE 5/5, WE 5/5,  5/5                    RIGHT UE - ShAB 5/5, EF 5/5, EE 5/5, WE 5/5,  5/5                    LEFT    LE - HF 4+/5                     RIGHT LE - HF 5/5, KE 5/5, DF 5/5, PF 5/5        Sensory - impaired L foot        Balance - WNL Static  Psychiatric - Mood stable, Affect WNL  ----------------------------------------------------------------------------------------  ASSESSMENT/PLAN  51 yo m pmh cva, hld, ? seizure rehab for change in mental status and change in speech as well as L calf pain.  found to have compartment syndrome, now s/p fasciotomy  wound vac  -also with LLE dvt  -tubular adenoma on colonoscopy/biopsy, recommended for outpatient follow up, possible additional biopsy  planning for closure per plastic surgery  Pain - dilaudid iv prn, oxy ir prn, lidocaine patch  diet: regular dash tlc  DVT PPX - lovenox  Rehab -  recommend subacute rehab when medically cleared.  will monitor for improvement

## 2023-02-02 NOTE — PROGRESS NOTE ADULT - ASSESSMENT
Patient is a 52-year-old male with a past medical history of CVA/TIA, ASD/PFO?, hyperlipidemia presents emerged department today with altered mental status from his nursing facility.  Patient just had a stay in this hospital when he was found unresponsive at home.  An extensive work-up with no significant etiology found.  There were thoughts that it was related to seizure activity however neurology did not find any seizure activity on their work-up.  Patient went to a nursing facility and rehab.  Was doing well until yesterday his parents noted him to be somewhat confused and having word finding difficulties.  Today his speech was worse when noticed by the wife.  He is also noted to have odd behavior where he was pulling at sheets and the strings of the masks.  He did not have this behavior before.  Only residual deficit from his previous strokes with some random word finding abilities however his speech today is reported to be significantly worse with worse word finding.  The patient denies any pain at this time.  He does acknowledge that he is having trouble speaking.  There is also report from the family and patient is complaining of left calf pain.  This pain is much more severe than it has been in the past.    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, SP LP, no malignancy on cyto   tele monitoring   brain MRI per Neuro recs, noted, follow up neuro recs , discussed with neurosurg, no plan for biopsy at this time     # Lekucytosis  Pan CX   Infectious W/U   CTA negative   ID eval appreciated    Hematology eval appreciated, follow up recs     #  Compartment syndrome   S/P Fasciotomy   Vascular follow up appreciated   dressing per vascular follow up recs   ID eval appreciated, cont zosyn, total of 7 days   as per vascualr, no need for BKLA>alive muscle, wound care  plastic surg eval called for possible flap , appreciated recs, wound vac care   resume AC , tolerating         # Elevated LFTs  resolved  resume statin         # ANemia  Occult negative  Total of 3 units PRBC  serial CBC   GI follow up appreciated

## 2023-02-03 LAB
ANION GAP SERPL CALC-SCNC: 11 MMOL/L — SIGNIFICANT CHANGE UP (ref 7–14)
BUN SERPL-MCNC: 8 MG/DL — SIGNIFICANT CHANGE UP (ref 7–23)
CALCIUM SERPL-MCNC: 8.8 MG/DL — SIGNIFICANT CHANGE UP (ref 8.4–10.5)
CHLORIDE SERPL-SCNC: 103 MMOL/L — SIGNIFICANT CHANGE UP (ref 98–107)
CO2 SERPL-SCNC: 26 MMOL/L — SIGNIFICANT CHANGE UP (ref 22–31)
CREAT SERPL-MCNC: 0.7 MG/DL — SIGNIFICANT CHANGE UP (ref 0.5–1.3)
EGFR: 111 ML/MIN/1.73M2 — SIGNIFICANT CHANGE UP
GLUCOSE SERPL-MCNC: 92 MG/DL — SIGNIFICANT CHANGE UP (ref 70–99)
HCT VFR BLD CALC: 29.4 % — LOW (ref 39–50)
HGB BLD-MCNC: 9 G/DL — LOW (ref 13–17)
MAGNESIUM SERPL-MCNC: 2 MG/DL — SIGNIFICANT CHANGE UP (ref 1.6–2.6)
MCHC RBC-ENTMCNC: 28.8 PG — SIGNIFICANT CHANGE UP (ref 27–34)
MCHC RBC-ENTMCNC: 30.6 GM/DL — LOW (ref 32–36)
MCV RBC AUTO: 94.2 FL — SIGNIFICANT CHANGE UP (ref 80–100)
NRBC # BLD: 0 /100 WBCS — SIGNIFICANT CHANGE UP (ref 0–0)
NRBC # FLD: 0 K/UL — SIGNIFICANT CHANGE UP (ref 0–0)
PHOSPHATE SERPL-MCNC: 4.6 MG/DL — HIGH (ref 2.5–4.5)
PLATELET # BLD AUTO: 539 K/UL — HIGH (ref 150–400)
POTASSIUM SERPL-MCNC: 4 MMOL/L — SIGNIFICANT CHANGE UP (ref 3.5–5.3)
POTASSIUM SERPL-SCNC: 4 MMOL/L — SIGNIFICANT CHANGE UP (ref 3.5–5.3)
RBC # BLD: 3.12 M/UL — LOW (ref 4.2–5.8)
RBC # FLD: 14.9 % — HIGH (ref 10.3–14.5)
SODIUM SERPL-SCNC: 140 MMOL/L — SIGNIFICANT CHANGE UP (ref 135–145)
WBC # BLD: 9.13 K/UL — SIGNIFICANT CHANGE UP (ref 3.8–10.5)
WBC # FLD AUTO: 9.13 K/UL — SIGNIFICANT CHANGE UP (ref 3.8–10.5)

## 2023-02-03 RX ADMIN — SENNA PLUS 2 TABLET(S): 8.6 TABLET ORAL at 21:17

## 2023-02-03 RX ADMIN — LIDOCAINE 1 PATCH: 4 CREAM TOPICAL at 10:38

## 2023-02-03 RX ADMIN — LIDOCAINE 1 PATCH: 4 CREAM TOPICAL at 01:57

## 2023-02-03 RX ADMIN — ATORVASTATIN CALCIUM 40 MILLIGRAM(S): 80 TABLET, FILM COATED ORAL at 21:24

## 2023-02-03 RX ADMIN — LACOSAMIDE 120 MILLIGRAM(S): 50 TABLET ORAL at 05:33

## 2023-02-03 RX ADMIN — LEVETIRACETAM 400 MILLIGRAM(S): 250 TABLET, FILM COATED ORAL at 05:07

## 2023-02-03 RX ADMIN — LACOSAMIDE 120 MILLIGRAM(S): 50 TABLET ORAL at 18:03

## 2023-02-03 RX ADMIN — ENOXAPARIN SODIUM 80 MILLIGRAM(S): 100 INJECTION SUBCUTANEOUS at 05:06

## 2023-02-03 RX ADMIN — PANTOPRAZOLE SODIUM 40 MILLIGRAM(S): 20 TABLET, DELAYED RELEASE ORAL at 18:02

## 2023-02-03 RX ADMIN — HYDROMORPHONE HYDROCHLORIDE 1 MILLIGRAM(S): 2 INJECTION INTRAMUSCULAR; INTRAVENOUS; SUBCUTANEOUS at 15:09

## 2023-02-03 RX ADMIN — MUPIROCIN 1 APPLICATION(S): 20 OINTMENT TOPICAL at 05:05

## 2023-02-03 RX ADMIN — PANTOPRAZOLE SODIUM 40 MILLIGRAM(S): 20 TABLET, DELAYED RELEASE ORAL at 05:05

## 2023-02-03 RX ADMIN — ENOXAPARIN SODIUM 80 MILLIGRAM(S): 100 INJECTION SUBCUTANEOUS at 18:02

## 2023-02-03 RX ADMIN — OXYCODONE HYDROCHLORIDE 5 MILLIGRAM(S): 5 TABLET ORAL at 01:57

## 2023-02-03 RX ADMIN — Medication 81 MILLIGRAM(S): at 10:37

## 2023-02-03 RX ADMIN — HYDROMORPHONE HYDROCHLORIDE 1 MILLIGRAM(S): 2 INJECTION INTRAMUSCULAR; INTRAVENOUS; SUBCUTANEOUS at 10:35

## 2023-02-03 RX ADMIN — POLYETHYLENE GLYCOL 3350 17 GRAM(S): 17 POWDER, FOR SOLUTION ORAL at 05:06

## 2023-02-03 RX ADMIN — Medication 1 TABLET(S): at 10:38

## 2023-02-03 RX ADMIN — MUPIROCIN 1 APPLICATION(S): 20 OINTMENT TOPICAL at 18:02

## 2023-02-03 RX ADMIN — HYDROMORPHONE HYDROCHLORIDE 1 MILLIGRAM(S): 2 INJECTION INTRAMUSCULAR; INTRAVENOUS; SUBCUTANEOUS at 18:02

## 2023-02-03 RX ADMIN — LEVETIRACETAM 400 MILLIGRAM(S): 250 TABLET, FILM COATED ORAL at 18:02

## 2023-02-03 NOTE — PROGRESS NOTE ADULT - SUBJECTIVE AND OBJECTIVE BOX
Neurology Progress Note    S: Patient seen and examined. No new events overnight. patient denied CP, SOB, HA or pain.     Medication:  MEDICATIONS  (STANDING):  aspirin enteric coated 81 milliGRAM(s) Oral daily  atorvastatin 40 milliGRAM(s) Oral at bedtime  chlorhexidine 2% Cloths 1 Application(s) Topical daily  enoxaparin Injectable 80 milliGRAM(s) SubCutaneous every 12 hours  lacosamide IVPB 100 milliGRAM(s) IV Intermittent every 12 hours  levETIRAcetam  IVPB 1500 milliGRAM(s) IV Intermittent every 12 hours  lidocaine   4% Patch 1 Patch Transdermal daily  multivitamin 1 Tablet(s) Oral daily  mupirocin 2% Ointment 1 Application(s) Topical two times a day  pantoprazole  Injectable 40 milliGRAM(s) IV Push two times a day  polyethylene glycol 3350 17 Gram(s) Oral two times a day  senna 2 Tablet(s) Oral at bedtime  sodium chloride 0.9%. 1000 milliLiter(s) (75 mL/Hr) IV Continuous <Continuous>    MEDICATIONS  (PRN):  HYDROmorphone  Injectable 1 milliGRAM(s) IV Push every 6 hours PRN severe breakthrough pain      Vitals:    Vital Signs Last 24 Hrs  T(C): 36.7 (03 Feb 2023 12:28), Max: 37.2 (03 Feb 2023 05:00)  T(F): 98 (03 Feb 2023 12:28), Max: 99 (03 Feb 2023 05:00)  HR: 81 (03 Feb 2023 12:28) (78 - 90)  BP: 108/69 (03 Feb 2023 12:28) (108/69 - 119/79)  BP(mean): --  RR: 18 (03 Feb 2023 12:28) (17 - 18)  SpO2: 99% (03 Feb 2023 12:28) (99% - 99%)    Parameters below as of 03 Feb 2023 12:28  Patient On (Oxygen Delivery Method): room air            General Exam:   General Appearance: Appropriately dressed and in no acute distress       Head: Normocephalic, atraumatic and no dysmorphic features  Ear, Nose, and Throat: Moist mucous membranes  CVS: S1S2+  Resp: No SOB, no wheeze or rhonchi  Abd: soft NTND  Extremities: No edema, no cyanosis  Skin: No bruises, no rashes     Neurological Exam:  Mental Status: Awake, alert and oriented x 3.  Able to follow simple and complex verbal commands. Able to name and repeat. fluent speech. No obvious aphasia or dysarthria noted.   Cranial Nerves: PERRL, EOMI, VFFC, sensation V1-V3 intact,  no obvious facial asymmetry , equal elevation of palate, scm/trap 5/5, tongue is midline on protrusion. no obvious papilledema on fundoscopic exam. Hearing is grossly intact.   Motor: Normal bulk, tone and strength throughout upperse. LLE in brace s/p OR. some distal limb movement. RLE 4+/5   Sensation: Intact to light touch and pinprick throughout. no right/left confusion. no extinction to tactile on DSS.   Reflexes: 1+ throughout at biceps, brachioradialis, triceps, patellars and ankles bilaterally and equal. No clonus. R toe and L toe were both downgoing.  Coordination: No dysmetria on FNF    Gait: deferred     I personally reviewed the below data/images/labs:  CBC Full  -  ( 03 Feb 2023 05:44 )  WBC Count : 9.13 K/uL  RBC Count : 3.12 M/uL  Hemoglobin : 9.0 g/dL  Hematocrit : 29.4 %  Platelet Count - Automated : 539 K/uL  Mean Cell Volume : 94.2 fL  Mean Cell Hemoglobin : 28.8 pg  Mean Cell Hemoglobin Concentration : 30.6 gm/dL  Auto Neutrophil # : x  Auto Lymphocyte # : x  Auto Monocyte # : x  Auto Eosinophil # : x  Auto Basophil # : x  Auto Neutrophil % : x  Auto Lymphocyte % : x  Auto Monocyte % : x  Auto Eosinophil % : x  Auto Basophil % : x    02-03    140  |  103  |  8   ----------------------------<  92  4.0   |  26  |  0.70    Ca    8.8      03 Feb 2023 05:44  Phos  4.6     02-03  Mg     2.00     02-03          MRI:   IMPRESSION:    Chronic infarcts of the right frontal subcortical and right centrum   semiovale. No new areas of infarct are identified. Unchanged scattered   foci of hemosiderin.    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   Differential diagnosis includes infection, sarcoidosis, lymphoma and   CLIPPERS (chronic lymphocytic inflammation with pontine perivascular   enhancement unresponsive to steroids).    --- End of Report ---    < end of copied text >  < from: MR Head w/wo IV Cont (12.29.22 @ 13:39) >  IMPRESSION:    Chronic infarcts of the right frontal subcortical and right centrum   semiovale. No new areas of infarct are identified. Unchanged scattered   foci of hemosiderin.    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   Differential diagnosis includes infection, sarcoidosis, lymphoma and   CLIPPERS (chronic lymphocytic inflammation with pontine perivascular   enhancement unresponsive to steroids).    --- End of Report ---    < end of copied text >  < from: MR Cervical Spine w/wo IV Cont (01.24.23 @ 08:43) >    ACC: [id number] EXAM:  MR SPINE CERVICAL WAW IC   ORDERED BY: GABBIE HUTCHINS     ACC: [id number] EXAM:  MR BRAIN WAW IC   ORDERED BY: GABBIE HUTCHINS     PROCEDURE DATE:  01/24/2023          INTERPRETATION:  CLINICAL INFORMATION: Altered mental status.    TECHNIQUE: Multisequence, multiplanar MRI of the brain and cervical spine   was obtained before and after the administration of intravenous contrast.    IV Contrast Administered: 8 cc Gadavist.  IV Contrast Discarded: 2 cc    COMPARISON: MRI brain 12/29/2022    FINDINGS:    MRI BRAIN:    There is no intraparenchymal hematoma, mass effect or midline shift.   There are a few foci of increased signal on T2/FLAIR involving the right   frontal subcortical region and right centrum semiovale, representing the   sequela of known prior infarcts. No new areas of infarct are identified.    Areas of contrast enhancement in the bilateral lentiform nucleus, thalami   and alexis appear less conspicuous/improved compared with prior study. Mild   associated increased T2 signal is unchanged to mildly improved.    No abnormal extra-axial fluid collections are present. There is no   diffusion abnormality to suggest acute or subacute infarction. Major   flow-voids at the base of the brain follow expected course and contour.   No abnormal enhancement on post-contrast images.    Proportional prominence of the sulci and ventricles, within normal limits   for the patient's age.    The calvarium is intact. The visualized intraorbital compartments are  intact. The paranasal sinuses are clear. The tympanomastoid cavities   appear free of acute disease.    MRI CERVICAL SPINE:    There is normal cervical lordosis. The vertebral bodies are of normal   height and configuration. The intervertebral discs spaces are within   normal limits. Right uncinate hypertrophy/right paracentral disc   osteophyte complex involving the C3-C4 level and a disc osteophyte   complex involving the C6-C7 level results in mild central narrowing.   Bilateral uncovertebral degenerative changes at C5-6 results in mild   bilateral neural foramina narrowing. The marrow signal is within normal   limits.  No prevertebral or paraspinal edema.    There is no cord compression or cord impingement. There is no epidural   collection.    There is faint enhancement involving the visualized portions of the alexis   and medulla and superior most cervical cord. Evaluation of the remainder   of the spinal cord demonstrates no evidence of abnormal signal changes or   abnormal enhancement.    IMPRESSION:    Areas of contrast enhancement in the bilateral lentiform nucleus, thalami   and alexis appear less conspicuous / improved compared with prior study.   There is minimal involvement of the upper cervical cord. Differential   includes meningothelial hyperplasia, neurosarcoidosis, lymphoma,   vasculitis and infection. Lower suspicion for CLIPPERS (chronic   lymphocytic inflammation with pontine perivascular enhancement responsive   to steroids) given the distribution.    Chronic infarcts involving the right frontal subcortical region and   right-sided semiovale. No new areas of infarct are identified.    --- End of Report ---       STEPHEN DE JESUS MD; Resident Radiology  This document has been electronically signed.  LEIGHANN JJ MD; Attending Radiologist  This document has been electronically signed. Jan 24 2023  1:32PM    < end of copied text >

## 2023-02-03 NOTE — PROGRESS NOTE ADULT - ASSESSMENT
52-year-old  M  TIAs hx TIAS and strokes initially thought to be 2/2 testosterone with recent admission for seizures and AMS here with confusion. PE with psychomotor slowing, aphasia frontal reflexes bilaterally distal LE weakness here iwth AMS, found to have a DVT iun the LLE MRi on 12/29/22 showed chronic right frontalm centrum semiovale strokes and bilateral lentiform and pontine enhancemeent  -s/p fasciotomy for left leg compartment syndrome 1/18  s/p wash out on 1/23   vEEG neg   MRI brain and C spine done 1/24/23: chronic R frontal and R semiovale infarcts. contrast enhancement imporved.  DDx c/f neurosarcoid, lymphoma, vasculitis, less suspicion for CLIPPERS     Impression: AMS with bilateral pontine and lentiform enhancement ? Clippers vs other; LLE 2/2 compartment syndrome     -LP with extensive CSF studies unrevealing  -Body imaging unrevealing, pelvic LNS not amenable to BX as per IR  - nsx no intervention at this time   - no role for hgih dose steroids   -Would reach out to NSX if amenable to biopsy  -On Keppra 1500 mg BID and Vimpat 100 mg BID  outpatient hypercoag workup neg   - heparin drip. statin held given rise in LFTs; changed to full dose lovenox  dcp    Braxton Forde MD  Vascular Neurology  Office: [phone]

## 2023-02-03 NOTE — PROGRESS NOTE ADULT - ASSESSMENT
Patient is a 52-year-old male with a past medical history of CVA/TIA, ASD/PFO?, hyperlipidemia presents emerged department today with altered mental status from his nursing facility.  Patient just had a stay in this hospital when he was found unresponsive at home.  An extensive work-up with no significant etiology found.  There were thoughts that it was related to seizure activity however neurology did not find any seizure activity on their work-up.  Patient went to a nursing facility and rehab.  Was doing well until yesterday his parents noted him to be somewhat confused and having word finding difficulties.  Today his speech was worse when noticed by the wife.  He is also noted to have odd behavior where he was pulling at sheets and the strings of the masks.  He did not have this behavior before.  Only residual deficit from his previous strokes with some random word finding abilities however his speech today is reported to be significantly worse with worse word finding.  The patient denies any pain at this time.  He does acknowledge that he is having trouble speaking.  There is also report from the family and patient is complaining of left calf pain.  This pain is much more severe than it has been in the past.    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, SP LP, no malignancy on cyto   tele monitoring   brain MRI per Neuro recs, noted, follow up neuro recs , discussed with neurosurg, no plan for biopsy at this time     # Lekucytosis  Pan CX   Infectious W/U   CTA negative   ID eval appreciated    Hematology eval appreciated, follow up recs     #  Compartment syndrome   S/P Fasciotomy   Vascular follow up appreciated   dressing per vascular follow up recs   ID eval appreciated, cont zosyn, total of 7 days   as per vascualr, no need for BKLA>alive muscle, wound care  plastic surg eval called for possible flap , appreciated recs, wound vac care   resume AC , tolerating         # Elevated LFTs  resolved  resume statin         # ANemia  Occult negative  Total of 3 units PRBC  serial CBC   GI follow up appreciated         DIscussed in detail with patient's partner over the phone. Ms swenson

## 2023-02-03 NOTE — PROGRESS NOTE ADULT - SUBJECTIVE AND OBJECTIVE BOX
Name of Patient : TAMI DUNHAM  MRN: [id number]  Date of visit: 02-03-23 @ 16:38      Subjective: Patient seen and examined. No new events except as noted.   Doing okay       REVIEW OF SYSTEMS:    CONSTITUTIONAL: No weakness, fevers or chills  EYES/ENT: No visual changes;  No vertigo or throat pain   NECK: No pain or stiffness  RESPIRATORY: No cough, wheezing, hemoptysis; No shortness of breath  CARDIOVASCULAR: No chest pain or palpitations  GASTROINTESTINAL: No abdominal or epigastric pain. No nausea, vomiting, or hematemesis; No diarrhea or constipation. No melena or hematochezia.  GENITOURINARY: No dysuria, frequency or hematuria  NEUROLOGICAL: No numbness or weakness  SKIN: No itching, burning, rashes, or lesions   All other review of systems is negative unless indicated above.    MEDICATIONS:  MEDICATIONS  (STANDING):  aspirin enteric coated 81 milliGRAM(s) Oral daily  atorvastatin 40 milliGRAM(s) Oral at bedtime  chlorhexidine 2% Cloths 1 Application(s) Topical daily  enoxaparin Injectable 80 milliGRAM(s) SubCutaneous every 12 hours  lacosamide IVPB 100 milliGRAM(s) IV Intermittent every 12 hours  levETIRAcetam  IVPB 1500 milliGRAM(s) IV Intermittent every 12 hours  lidocaine   4% Patch 1 Patch Transdermal daily  multivitamin 1 Tablet(s) Oral daily  mupirocin 2% Ointment 1 Application(s) Topical two times a day  pantoprazole  Injectable 40 milliGRAM(s) IV Push two times a day  polyethylene glycol 3350 17 Gram(s) Oral two times a day  senna 2 Tablet(s) Oral at bedtime  sodium chloride 0.9%. 1000 milliLiter(s) (75 mL/Hr) IV Continuous <Continuous>      PHYSICAL EXAM:  T(C): 36.7 (02-03-23 @ 12:28), Max: 37.2 (02-03-23 @ 05:00)  HR: 81 (02-03-23 @ 12:28) (78 - 90)  BP: 108/69 (02-03-23 @ 12:28) (108/69 - 119/79)  RR: 18 (02-03-23 @ 12:28) (17 - 18)  SpO2: 99% (02-03-23 @ 12:28) (99% - 99%)  Wt(kg): --  I&O's Summary    02 Feb 2023 07:01  -  03 Feb 2023 07:00  --------------------------------------------------------  IN: 0 mL / OUT: 1250 mL / NET: -1250 mL          Appearance: Normal	  HEENT:  PERRLA   Lymphatic: No lymphadenopathy   Cardiovascular: Normal S1 S2, no JVD  Respiratory: normal effort , clear  Gastrointestinal:  Soft, Non-tender  Skin: No rashes,  warm to touch  Psychiatry:  Mood & affect appropriate  Musculuskeletal: LE wound vac     recent labs, Imaging and EKGs personally reviewed     02-02-23 @ 07:01  -  02-03-23 @ 07:00  --------------------------------------------------------  IN: 0 mL / OUT: 1250 mL / NET: -1250 mL                          9.0    9.13  )-----------( 539      ( 03 Feb 2023 05:44 )             29.4               02-03    140  |  103  |  8   ----------------------------<  92  4.0   |  26  |  0.70    Ca    8.8      03 Feb 2023 05:44  Phos  4.6     02-03  Mg     2.00     02-03

## 2023-02-04 LAB
ALBUMIN SERPL ELPH-MCNC: 2.9 G/DL — LOW (ref 3.3–5)
ALP SERPL-CCNC: 88 U/L — SIGNIFICANT CHANGE UP (ref 40–120)
ALT FLD-CCNC: 19 U/L — SIGNIFICANT CHANGE UP (ref 4–41)
ANION GAP SERPL CALC-SCNC: 10 MMOL/L — SIGNIFICANT CHANGE UP (ref 7–14)
APPEARANCE UR: CLEAR — SIGNIFICANT CHANGE UP
AST SERPL-CCNC: 16 U/L — SIGNIFICANT CHANGE UP (ref 4–40)
BILIRUB SERPL-MCNC: 0.3 MG/DL — SIGNIFICANT CHANGE UP (ref 0.2–1.2)
BILIRUB UR-MCNC: NEGATIVE — SIGNIFICANT CHANGE UP
BLD GP AB SCN SERPL QL: NEGATIVE — SIGNIFICANT CHANGE UP
BUN SERPL-MCNC: 8 MG/DL — SIGNIFICANT CHANGE UP (ref 7–23)
CALCIUM SERPL-MCNC: 8.7 MG/DL — SIGNIFICANT CHANGE UP (ref 8.4–10.5)
CHLORIDE SERPL-SCNC: 102 MMOL/L — SIGNIFICANT CHANGE UP (ref 98–107)
CO2 SERPL-SCNC: 26 MMOL/L — SIGNIFICANT CHANGE UP (ref 22–31)
COLOR SPEC: SIGNIFICANT CHANGE UP
CREAT SERPL-MCNC: 0.64 MG/DL — SIGNIFICANT CHANGE UP (ref 0.5–1.3)
DIFF PNL FLD: NEGATIVE — SIGNIFICANT CHANGE UP
EGFR: 114 ML/MIN/1.73M2 — SIGNIFICANT CHANGE UP
GLUCOSE SERPL-MCNC: 116 MG/DL — HIGH (ref 70–99)
GLUCOSE UR QL: NEGATIVE — SIGNIFICANT CHANGE UP
HCT VFR BLD CALC: 29.8 % — LOW (ref 39–50)
HGB BLD-MCNC: 9.2 G/DL — LOW (ref 13–17)
KETONES UR-MCNC: NEGATIVE — SIGNIFICANT CHANGE UP
LEUKOCYTE ESTERASE UR-ACNC: NEGATIVE — SIGNIFICANT CHANGE UP
MAGNESIUM SERPL-MCNC: 1.9 MG/DL — SIGNIFICANT CHANGE UP (ref 1.6–2.6)
MCHC RBC-ENTMCNC: 29 PG — SIGNIFICANT CHANGE UP (ref 27–34)
MCHC RBC-ENTMCNC: 30.9 GM/DL — LOW (ref 32–36)
MCV RBC AUTO: 94 FL — SIGNIFICANT CHANGE UP (ref 80–100)
NITRITE UR-MCNC: NEGATIVE — SIGNIFICANT CHANGE UP
NRBC # BLD: 0 /100 WBCS — SIGNIFICANT CHANGE UP (ref 0–0)
NRBC # FLD: 0 K/UL — SIGNIFICANT CHANGE UP (ref 0–0)
PH UR: 7 — SIGNIFICANT CHANGE UP (ref 5–8)
PHOSPHATE SERPL-MCNC: 4.6 MG/DL — HIGH (ref 2.5–4.5)
PLATELET # BLD AUTO: 475 K/UL — HIGH (ref 150–400)
POTASSIUM SERPL-MCNC: 3.4 MMOL/L — LOW (ref 3.5–5.3)
POTASSIUM SERPL-SCNC: 3.4 MMOL/L — LOW (ref 3.5–5.3)
PROT SERPL-MCNC: 6.2 G/DL — SIGNIFICANT CHANGE UP (ref 6–8.3)
PROT UR-MCNC: NEGATIVE — SIGNIFICANT CHANGE UP
RBC # BLD: 3.17 M/UL — LOW (ref 4.2–5.8)
RBC # FLD: 14.6 % — HIGH (ref 10.3–14.5)
RH IG SCN BLD-IMP: NEGATIVE — SIGNIFICANT CHANGE UP
SODIUM SERPL-SCNC: 138 MMOL/L — SIGNIFICANT CHANGE UP (ref 135–145)
SP GR SPEC: 1.01 — SIGNIFICANT CHANGE UP (ref 1.01–1.05)
UROBILINOGEN FLD QL: SIGNIFICANT CHANGE UP
WBC # BLD: 8.07 K/UL — SIGNIFICANT CHANGE UP (ref 3.8–10.5)
WBC # FLD AUTO: 8.07 K/UL — SIGNIFICANT CHANGE UP (ref 3.8–10.5)

## 2023-02-04 RX ORDER — POTASSIUM CHLORIDE 20 MEQ
40 PACKET (EA) ORAL ONCE
Refills: 0 | Status: COMPLETED | OUTPATIENT
Start: 2023-02-04 | End: 2023-02-04

## 2023-02-04 RX ORDER — CALCIUM ACETATE 667 MG
667 TABLET ORAL
Refills: 0 | Status: COMPLETED | OUTPATIENT
Start: 2023-02-04 | End: 2023-02-04

## 2023-02-04 RX ORDER — HYDROMORPHONE HYDROCHLORIDE 2 MG/ML
1 INJECTION INTRAMUSCULAR; INTRAVENOUS; SUBCUTANEOUS EVERY 6 HOURS
Refills: 0 | Status: DISCONTINUED | OUTPATIENT
Start: 2023-02-04 | End: 2023-02-06

## 2023-02-04 RX ORDER — POTASSIUM CHLORIDE 20 MEQ
20 PACKET (EA) ORAL ONCE
Refills: 0 | Status: COMPLETED | OUTPATIENT
Start: 2023-02-04 | End: 2023-02-04

## 2023-02-04 RX ADMIN — Medication 81 MILLIGRAM(S): at 17:31

## 2023-02-04 RX ADMIN — CHLORHEXIDINE GLUCONATE 1 APPLICATION(S): 213 SOLUTION TOPICAL at 17:32

## 2023-02-04 RX ADMIN — POLYETHYLENE GLYCOL 3350 17 GRAM(S): 17 POWDER, FOR SOLUTION ORAL at 05:02

## 2023-02-04 RX ADMIN — HYDROMORPHONE HYDROCHLORIDE 1 MILLIGRAM(S): 2 INJECTION INTRAMUSCULAR; INTRAVENOUS; SUBCUTANEOUS at 03:30

## 2023-02-04 RX ADMIN — HYDROMORPHONE HYDROCHLORIDE 1 MILLIGRAM(S): 2 INJECTION INTRAMUSCULAR; INTRAVENOUS; SUBCUTANEOUS at 18:23

## 2023-02-04 RX ADMIN — ENOXAPARIN SODIUM 80 MILLIGRAM(S): 100 INJECTION SUBCUTANEOUS at 05:02

## 2023-02-04 RX ADMIN — LIDOCAINE 1 PATCH: 4 CREAM TOPICAL at 17:31

## 2023-02-04 RX ADMIN — HYDROMORPHONE HYDROCHLORIDE 1 MILLIGRAM(S): 2 INJECTION INTRAMUSCULAR; INTRAVENOUS; SUBCUTANEOUS at 03:15

## 2023-02-04 RX ADMIN — ATORVASTATIN CALCIUM 40 MILLIGRAM(S): 80 TABLET, FILM COATED ORAL at 21:51

## 2023-02-04 RX ADMIN — Medication 667 MILLIGRAM(S): at 09:19

## 2023-02-04 RX ADMIN — POLYETHYLENE GLYCOL 3350 17 GRAM(S): 17 POWDER, FOR SOLUTION ORAL at 17:30

## 2023-02-04 RX ADMIN — LEVETIRACETAM 400 MILLIGRAM(S): 250 TABLET, FILM COATED ORAL at 05:04

## 2023-02-04 RX ADMIN — LACOSAMIDE 120 MILLIGRAM(S): 50 TABLET ORAL at 05:03

## 2023-02-04 RX ADMIN — Medication 1 TABLET(S): at 17:31

## 2023-02-04 RX ADMIN — LEVETIRACETAM 400 MILLIGRAM(S): 250 TABLET, FILM COATED ORAL at 17:21

## 2023-02-04 RX ADMIN — MUPIROCIN 1 APPLICATION(S): 20 OINTMENT TOPICAL at 17:29

## 2023-02-04 RX ADMIN — LACOSAMIDE 120 MILLIGRAM(S): 50 TABLET ORAL at 18:29

## 2023-02-04 RX ADMIN — Medication 667 MILLIGRAM(S): at 14:38

## 2023-02-04 RX ADMIN — MUPIROCIN 1 APPLICATION(S): 20 OINTMENT TOPICAL at 05:02

## 2023-02-04 RX ADMIN — PANTOPRAZOLE SODIUM 40 MILLIGRAM(S): 20 TABLET, DELAYED RELEASE ORAL at 05:03

## 2023-02-04 RX ADMIN — PANTOPRAZOLE SODIUM 40 MILLIGRAM(S): 20 TABLET, DELAYED RELEASE ORAL at 17:29

## 2023-02-04 RX ADMIN — HYDROMORPHONE HYDROCHLORIDE 1 MILLIGRAM(S): 2 INJECTION INTRAMUSCULAR; INTRAVENOUS; SUBCUTANEOUS at 18:55

## 2023-02-04 RX ADMIN — SENNA PLUS 2 TABLET(S): 8.6 TABLET ORAL at 21:50

## 2023-02-04 RX ADMIN — Medication 20 MILLIEQUIVALENT(S): at 06:42

## 2023-02-04 RX ADMIN — Medication 40 MILLIEQUIVALENT(S): at 09:20

## 2023-02-04 RX ADMIN — ENOXAPARIN SODIUM 80 MILLIGRAM(S): 100 INJECTION SUBCUTANEOUS at 17:30

## 2023-02-04 NOTE — PROGRESS NOTE ADULT - SUBJECTIVE AND OBJECTIVE BOX
Name of Patient : TAMI DUNHAM  MRN: [id number]  Date of visit: 23       Subjective: Patient seen and examined. No new events except as noted.   Doing okay     REVIEW OF SYSTEMS:    CONSTITUTIONAL: No weakness, fevers or chills  EYES/ENT: No visual changes;  No vertigo or throat pain   NECK: No pain or stiffness  RESPIRATORY: No cough, wheezing, hemoptysis; No shortness of breath  CARDIOVASCULAR: No chest pain or palpitations  GASTROINTESTINAL: No abdominal or epigastric pain. No nausea, vomiting, or hematemesis; No diarrhea or constipation. No melena or hematochezia.  GENITOURINARY: No dysuria, frequency or hematuria  NEUROLOGICAL: No numbness or weakness  SKIN: No itching, burning, rashes, or lesions   All other review of systems is negative unless indicated above.    MEDICATIONS:  MEDICATIONS  (STANDING):  aspirin enteric coated 81 milliGRAM(s) Oral daily  atorvastatin 40 milliGRAM(s) Oral at bedtime  chlorhexidine 2% Cloths 1 Application(s) Topical daily  enoxaparin Injectable 80 milliGRAM(s) SubCutaneous every 12 hours  lacosamide IVPB 100 milliGRAM(s) IV Intermittent every 12 hours  levETIRAcetam  IVPB 1500 milliGRAM(s) IV Intermittent every 12 hours  lidocaine   4% Patch 1 Patch Transdermal daily  multivitamin 1 Tablet(s) Oral daily  mupirocin 2% Ointment 1 Application(s) Topical two times a day  pantoprazole  Injectable 40 milliGRAM(s) IV Push two times a day  polyethylene glycol 3350 17 Gram(s) Oral two times a day  senna 2 Tablet(s) Oral at bedtime  sodium chloride 0.9%. 1000 milliLiter(s) (75 mL/Hr) IV Continuous <Continuous>      PHYSICAL EXAM:  T(C): 36.8 (23 @ 14:24), Max: 36.8 (23 @ 14:24)  HR: 76 (23 @ 03:15) (76 - 87)  BP: 120/68 (23 @ 14:24) (112/75 - 120/68)  RR: 18 (23 @ 14:24) (18 - 18)  SpO2: 100% (23 @ 14:24) (96% - 100%)  Wt(kg): --  I&O's Summary    2023 07:01  -  2023 07:00  --------------------------------------------------------  IN: 0 mL / OUT: 800 mL / NET: -800 mL          Appearance: Normal	  HEENT:  PERRLA   Lymphatic: No lymphadenopathy   Cardiovascular: Normal S1 S2, no JVD  Respiratory: normal effort , clear  Gastrointestinal:  Soft, Non-tender  Skin: No rashes,  warm to touch  Psychiatry:  Mood & affect appropriate  Musculuskeletal: No edema    recent labs, Imaging and EKGs personally reviewed       23 @ 07:01  -  23 @ 07:00  --------------------------------------------------------  IN: 0 mL / OUT: 800 mL / NET: -800 mL                          9.2    8.07  )-----------( 475      ( 2023 04:35 )             29.8               02-04    138  |  102  |  8   ----------------------------<  116<H>  3.4<L>   |  26  |  0.64    Ca    8.7      2023 04:35  Phos  4.6     02-04  Mg     1.90     02-04    TPro  6.2  /  Alb  2.9<L>  /  TBili  0.3  /  DBili  x   /  AST  16  /  ALT  19  /  AlkPhos  88  02-04                       Urinalysis Basic - ( 2023 16:40 )    Color: Light Yellow / Appearance: Clear / S.010 / pH: x  Gluc: x / Ketone: Negative  / Bili: Negative / Urobili: <2 mg/dL   Blood: x / Protein: Negative / Nitrite: Negative   Leuk Esterase: Negative / RBC: x / WBC x   Sq Epi: x / Non Sq Epi: x / Bacteria: x

## 2023-02-05 LAB
ALBUMIN SERPL ELPH-MCNC: 2.9 G/DL — LOW (ref 3.3–5)
ALP SERPL-CCNC: 84 U/L — SIGNIFICANT CHANGE UP (ref 40–120)
ALT FLD-CCNC: 19 U/L — SIGNIFICANT CHANGE UP (ref 4–41)
ANION GAP SERPL CALC-SCNC: 10 MMOL/L — SIGNIFICANT CHANGE UP (ref 7–14)
AST SERPL-CCNC: 16 U/L — SIGNIFICANT CHANGE UP (ref 4–40)
BILIRUB SERPL-MCNC: 0.3 MG/DL — SIGNIFICANT CHANGE UP (ref 0.2–1.2)
BUN SERPL-MCNC: 10 MG/DL — SIGNIFICANT CHANGE UP (ref 7–23)
CALCIUM SERPL-MCNC: 8.7 MG/DL — SIGNIFICANT CHANGE UP (ref 8.4–10.5)
CHLORIDE SERPL-SCNC: 103 MMOL/L — SIGNIFICANT CHANGE UP (ref 98–107)
CO2 SERPL-SCNC: 26 MMOL/L — SIGNIFICANT CHANGE UP (ref 22–31)
CREAT SERPL-MCNC: 0.7 MG/DL — SIGNIFICANT CHANGE UP (ref 0.5–1.3)
CULTURE RESULTS: SIGNIFICANT CHANGE UP
EGFR: 111 ML/MIN/1.73M2 — SIGNIFICANT CHANGE UP
GLUCOSE SERPL-MCNC: 94 MG/DL — SIGNIFICANT CHANGE UP (ref 70–99)
HCT VFR BLD CALC: 30.5 % — LOW (ref 39–50)
HGB BLD-MCNC: 9.5 G/DL — LOW (ref 13–17)
MAGNESIUM SERPL-MCNC: 1.9 MG/DL — SIGNIFICANT CHANGE UP (ref 1.6–2.6)
MCHC RBC-ENTMCNC: 29.6 PG — SIGNIFICANT CHANGE UP (ref 27–34)
MCHC RBC-ENTMCNC: 31.1 GM/DL — LOW (ref 32–36)
MCV RBC AUTO: 95 FL — SIGNIFICANT CHANGE UP (ref 80–100)
NRBC # BLD: 0 /100 WBCS — SIGNIFICANT CHANGE UP (ref 0–0)
NRBC # FLD: 0 K/UL — SIGNIFICANT CHANGE UP (ref 0–0)
PHOSPHATE SERPL-MCNC: 4.3 MG/DL — SIGNIFICANT CHANGE UP (ref 2.5–4.5)
PLATELET # BLD AUTO: 399 K/UL — SIGNIFICANT CHANGE UP (ref 150–400)
POTASSIUM SERPL-MCNC: 4.1 MMOL/L — SIGNIFICANT CHANGE UP (ref 3.5–5.3)
POTASSIUM SERPL-SCNC: 4.1 MMOL/L — SIGNIFICANT CHANGE UP (ref 3.5–5.3)
PROT SERPL-MCNC: 6.1 G/DL — SIGNIFICANT CHANGE UP (ref 6–8.3)
RBC # BLD: 3.21 M/UL — LOW (ref 4.2–5.8)
RBC # FLD: 14.7 % — HIGH (ref 10.3–14.5)
SARS-COV-2 RNA SPEC QL NAA+PROBE: SIGNIFICANT CHANGE UP
SODIUM SERPL-SCNC: 139 MMOL/L — SIGNIFICANT CHANGE UP (ref 135–145)
SPECIMEN SOURCE: SIGNIFICANT CHANGE UP
WBC # BLD: 9.04 K/UL — SIGNIFICANT CHANGE UP (ref 3.8–10.5)
WBC # FLD AUTO: 9.04 K/UL — SIGNIFICANT CHANGE UP (ref 3.8–10.5)

## 2023-02-05 RX ORDER — PHENAZOPYRIDINE HCL 100 MG
100 TABLET ORAL EVERY 8 HOURS
Refills: 0 | Status: DISCONTINUED | OUTPATIENT
Start: 2023-02-05 | End: 2023-02-06

## 2023-02-05 RX ADMIN — ATORVASTATIN CALCIUM 40 MILLIGRAM(S): 80 TABLET, FILM COATED ORAL at 21:21

## 2023-02-05 RX ADMIN — ENOXAPARIN SODIUM 80 MILLIGRAM(S): 100 INJECTION SUBCUTANEOUS at 17:28

## 2023-02-05 RX ADMIN — LACOSAMIDE 120 MILLIGRAM(S): 50 TABLET ORAL at 18:14

## 2023-02-05 RX ADMIN — LIDOCAINE 1 PATCH: 4 CREAM TOPICAL at 17:29

## 2023-02-05 RX ADMIN — LEVETIRACETAM 400 MILLIGRAM(S): 250 TABLET, FILM COATED ORAL at 17:26

## 2023-02-05 RX ADMIN — LIDOCAINE 1 PATCH: 4 CREAM TOPICAL at 19:41

## 2023-02-05 RX ADMIN — LEVETIRACETAM 400 MILLIGRAM(S): 250 TABLET, FILM COATED ORAL at 05:16

## 2023-02-05 RX ADMIN — Medication 100 MILLIGRAM(S): at 21:21

## 2023-02-05 RX ADMIN — ENOXAPARIN SODIUM 80 MILLIGRAM(S): 100 INJECTION SUBCUTANEOUS at 05:10

## 2023-02-05 RX ADMIN — PANTOPRAZOLE SODIUM 40 MILLIGRAM(S): 20 TABLET, DELAYED RELEASE ORAL at 05:10

## 2023-02-05 RX ADMIN — HYDROMORPHONE HYDROCHLORIDE 1 MILLIGRAM(S): 2 INJECTION INTRAMUSCULAR; INTRAVENOUS; SUBCUTANEOUS at 17:15

## 2023-02-05 RX ADMIN — SENNA PLUS 2 TABLET(S): 8.6 TABLET ORAL at 21:21

## 2023-02-05 RX ADMIN — PANTOPRAZOLE SODIUM 40 MILLIGRAM(S): 20 TABLET, DELAYED RELEASE ORAL at 17:28

## 2023-02-05 RX ADMIN — HYDROMORPHONE HYDROCHLORIDE 1 MILLIGRAM(S): 2 INJECTION INTRAMUSCULAR; INTRAVENOUS; SUBCUTANEOUS at 00:34

## 2023-02-05 RX ADMIN — LACOSAMIDE 120 MILLIGRAM(S): 50 TABLET ORAL at 05:11

## 2023-02-05 RX ADMIN — POLYETHYLENE GLYCOL 3350 17 GRAM(S): 17 POWDER, FOR SOLUTION ORAL at 05:11

## 2023-02-05 RX ADMIN — HYDROMORPHONE HYDROCHLORIDE 1 MILLIGRAM(S): 2 INJECTION INTRAMUSCULAR; INTRAVENOUS; SUBCUTANEOUS at 01:20

## 2023-02-05 RX ADMIN — HYDROMORPHONE HYDROCHLORIDE 1 MILLIGRAM(S): 2 INJECTION INTRAMUSCULAR; INTRAVENOUS; SUBCUTANEOUS at 06:24

## 2023-02-05 RX ADMIN — Medication 100 MILLIGRAM(S): at 13:53

## 2023-02-05 RX ADMIN — MUPIROCIN 1 APPLICATION(S): 20 OINTMENT TOPICAL at 17:28

## 2023-02-05 RX ADMIN — MUPIROCIN 1 APPLICATION(S): 20 OINTMENT TOPICAL at 05:11

## 2023-02-05 RX ADMIN — Medication 1 TABLET(S): at 17:29

## 2023-02-05 RX ADMIN — LIDOCAINE 1 PATCH: 4 CREAM TOPICAL at 05:09

## 2023-02-05 RX ADMIN — POLYETHYLENE GLYCOL 3350 17 GRAM(S): 17 POWDER, FOR SOLUTION ORAL at 17:29

## 2023-02-05 RX ADMIN — Medication 81 MILLIGRAM(S): at 17:29

## 2023-02-05 RX ADMIN — LIDOCAINE 1 PATCH: 4 CREAM TOPICAL at 20:00

## 2023-02-05 RX ADMIN — CHLORHEXIDINE GLUCONATE 1 APPLICATION(S): 213 SOLUTION TOPICAL at 17:30

## 2023-02-05 RX ADMIN — HYDROMORPHONE HYDROCHLORIDE 1 MILLIGRAM(S): 2 INJECTION INTRAMUSCULAR; INTRAVENOUS; SUBCUTANEOUS at 18:35

## 2023-02-05 NOTE — PROGRESS NOTE ADULT - SUBJECTIVE AND OBJECTIVE BOX
Name of Patient : TAMI DUNHAM  MRN: [id number]  Date of visit: 23      Subjective: Patient seen and examined. No new events except as noted.   Doing okay     REVIEW OF SYSTEMS:    CONSTITUTIONAL: No weakness, fevers or chills  EYES/ENT: No visual changes;  No vertigo or throat pain   NECK: No pain or stiffness  RESPIRATORY: No cough, wheezing, hemoptysis; No shortness of breath  CARDIOVASCULAR: No chest pain or palpitations  GASTROINTESTINAL: No abdominal or epigastric pain. No nausea, vomiting, or hematemesis; No diarrhea or constipation. No melena or hematochezia.  GENITOURINARY: No dysuria, frequency or hematuria  NEUROLOGICAL: No numbness or weakness  SKIN: No itching, burning, rashes, or lesions   All other review of systems is negative unless indicated above.    MEDICATIONS:  MEDICATIONS  (STANDING):  aspirin enteric coated 81 milliGRAM(s) Oral daily  atorvastatin 40 milliGRAM(s) Oral at bedtime  chlorhexidine 2% Cloths 1 Application(s) Topical daily  enoxaparin Injectable 80 milliGRAM(s) SubCutaneous every 12 hours  lacosamide IVPB 100 milliGRAM(s) IV Intermittent every 12 hours  levETIRAcetam  IVPB 1500 milliGRAM(s) IV Intermittent every 12 hours  lidocaine   4% Patch 1 Patch Transdermal daily  multivitamin 1 Tablet(s) Oral daily  mupirocin 2% Ointment 1 Application(s) Topical two times a day  pantoprazole  Injectable 40 milliGRAM(s) IV Push two times a day  phenazopyridine 100 milliGRAM(s) Oral every 8 hours  polyethylene glycol 3350 17 Gram(s) Oral two times a day  senna 2 Tablet(s) Oral at bedtime  sodium chloride 0.9%. 1000 milliLiter(s) (75 mL/Hr) IV Continuous <Continuous>      PHYSICAL EXAM:  T(C): 36.6 (23 @ 12:49), Max: 37.2 (23 @ 20:43)  HR: 72 (23 @ 12:49) (72 - 82)  BP: 102/68 (23 @ 12:49) (102/68 - 120/84)  RR: 18 (23 @ 12:49) (18 - 18)  SpO2: 100% (23 @ 12:49) (100% - 100%)  Wt(kg): --  I&O's Summary        Appearance: Normal	  HEENT:  PERRLA   Lymphatic: No lymphadenopathy   Cardiovascular: Normal S1 S2, no JVD  Respiratory: normal effort , clear  Gastrointestinal:  Soft, Non-tender  Skin: No rashes,  warm to touch  Psychiatry:  Mood & affect appropriate  Musculuskeletal: Wound vac     recent labs, Imaging and EKGs personally reviewed                           9.5    9.04  )-----------( 399      ( 2023 06:00 )             30.5               02-05    139  |  103  |  10  ----------------------------<  94  4.1   |  26  |  0.70    Ca    8.7      2023 06:00  Phos  4.3     -05  Mg     1.90     -    TPro  6.1  /  Alb  2.9<L>  /  TBili  0.3  /  DBili  x   /  AST  16  /  ALT  19  /  AlkPhos  84  02-05                       Urinalysis Basic - ( 2023 16:40 )    Color: Light Yellow / Appearance: Clear / S.010 / pH: x  Gluc: x / Ketone: Negative  / Bili: Negative / Urobili: <2 mg/dL   Blood: x / Protein: Negative / Nitrite: Negative   Leuk Esterase: Negative / RBC: x / WBC x   Sq Epi: x / Non Sq Epi: x / Bacteria: x

## 2023-02-06 ENCOUNTER — TRANSCRIPTION ENCOUNTER (OUTPATIENT)
Age: 53
End: 2023-02-06

## 2023-02-06 VITALS
HEART RATE: 79 BPM | SYSTOLIC BLOOD PRESSURE: 121 MMHG | TEMPERATURE: 98 F | RESPIRATION RATE: 18 BRPM | DIASTOLIC BLOOD PRESSURE: 75 MMHG | OXYGEN SATURATION: 98 %

## 2023-02-06 RX ORDER — POLYETHYLENE GLYCOL 3350 17 G/17G
17 POWDER, FOR SOLUTION ORAL
Qty: 0 | Refills: 0 | DISCHARGE
Start: 2023-02-06

## 2023-02-06 RX ORDER — OXYCODONE HYDROCHLORIDE 5 MG/1
5 TABLET ORAL EVERY 6 HOURS
Refills: 0 | Status: DISCONTINUED | OUTPATIENT
Start: 2023-02-06 | End: 2023-02-06

## 2023-02-06 RX ORDER — PHENAZOPYRIDINE HCL 100 MG
1 TABLET ORAL
Qty: 0 | Refills: 0 | DISCHARGE
Start: 2023-02-06

## 2023-02-06 RX ORDER — OXYCODONE HYDROCHLORIDE 5 MG/1
1 TABLET ORAL
Qty: 0 | Refills: 0 | DISCHARGE
Start: 2023-02-06

## 2023-02-06 RX ORDER — SENNA PLUS 8.6 MG/1
2 TABLET ORAL
Qty: 0 | Refills: 0 | DISCHARGE
Start: 2023-02-06

## 2023-02-06 RX ORDER — ENOXAPARIN SODIUM 100 MG/ML
80 INJECTION SUBCUTANEOUS
Qty: 0 | Refills: 0 | DISCHARGE
Start: 2023-02-06

## 2023-02-06 RX ORDER — PANTOPRAZOLE SODIUM 20 MG/1
40 TABLET, DELAYED RELEASE ORAL
Qty: 0 | Refills: 0 | DISCHARGE
Start: 2023-02-06

## 2023-02-06 RX ADMIN — POLYETHYLENE GLYCOL 3350 17 GRAM(S): 17 POWDER, FOR SOLUTION ORAL at 05:11

## 2023-02-06 RX ADMIN — Medication 100 MILLIGRAM(S): at 14:03

## 2023-02-06 RX ADMIN — Medication 1 TABLET(S): at 14:03

## 2023-02-06 RX ADMIN — ENOXAPARIN SODIUM 80 MILLIGRAM(S): 100 INJECTION SUBCUTANEOUS at 05:09

## 2023-02-06 RX ADMIN — POLYETHYLENE GLYCOL 3350 17 GRAM(S): 17 POWDER, FOR SOLUTION ORAL at 17:17

## 2023-02-06 RX ADMIN — LACOSAMIDE 120 MILLIGRAM(S): 50 TABLET ORAL at 17:16

## 2023-02-06 RX ADMIN — PANTOPRAZOLE SODIUM 40 MILLIGRAM(S): 20 TABLET, DELAYED RELEASE ORAL at 05:09

## 2023-02-06 RX ADMIN — HYDROMORPHONE HYDROCHLORIDE 1 MILLIGRAM(S): 2 INJECTION INTRAMUSCULAR; INTRAVENOUS; SUBCUTANEOUS at 05:53

## 2023-02-06 RX ADMIN — Medication 100 MILLIGRAM(S): at 05:08

## 2023-02-06 RX ADMIN — OXYCODONE HYDROCHLORIDE 5 MILLIGRAM(S): 5 TABLET ORAL at 08:59

## 2023-02-06 RX ADMIN — MUPIROCIN 1 APPLICATION(S): 20 OINTMENT TOPICAL at 17:17

## 2023-02-06 RX ADMIN — Medication 81 MILLIGRAM(S): at 14:03

## 2023-02-06 RX ADMIN — LEVETIRACETAM 400 MILLIGRAM(S): 250 TABLET, FILM COATED ORAL at 17:53

## 2023-02-06 RX ADMIN — HYDROMORPHONE HYDROCHLORIDE 1 MILLIGRAM(S): 2 INJECTION INTRAMUSCULAR; INTRAVENOUS; SUBCUTANEOUS at 17:16

## 2023-02-06 RX ADMIN — HYDROMORPHONE HYDROCHLORIDE 1 MILLIGRAM(S): 2 INJECTION INTRAMUSCULAR; INTRAVENOUS; SUBCUTANEOUS at 06:08

## 2023-02-06 RX ADMIN — LEVETIRACETAM 400 MILLIGRAM(S): 250 TABLET, FILM COATED ORAL at 05:08

## 2023-02-06 RX ADMIN — PANTOPRAZOLE SODIUM 40 MILLIGRAM(S): 20 TABLET, DELAYED RELEASE ORAL at 17:17

## 2023-02-06 RX ADMIN — LACOSAMIDE 120 MILLIGRAM(S): 50 TABLET ORAL at 05:53

## 2023-02-06 RX ADMIN — HYDROMORPHONE HYDROCHLORIDE 1 MILLIGRAM(S): 2 INJECTION INTRAMUSCULAR; INTRAVENOUS; SUBCUTANEOUS at 18:42

## 2023-02-06 RX ADMIN — MUPIROCIN 1 APPLICATION(S): 20 OINTMENT TOPICAL at 05:09

## 2023-02-06 RX ADMIN — CHLORHEXIDINE GLUCONATE 1 APPLICATION(S): 213 SOLUTION TOPICAL at 14:08

## 2023-02-06 RX ADMIN — ENOXAPARIN SODIUM 80 MILLIGRAM(S): 100 INJECTION SUBCUTANEOUS at 17:17

## 2023-02-06 NOTE — PROGRESS NOTE ADULT - PROVIDER SPECIALTY LIST ADULT
Heme/Onc
Internal Medicine
Internal Medicine
Rehab Medicine
Rehab Medicine
Vascular Surgery
Gastroenterology
Gastroenterology
Heme/Onc
Infectious Disease
Infectious Disease
Internal Medicine
Neurology
Plastic Surgery
Plastic Surgery
Vascular Surgery
Heme/Onc
Infectious Disease
Internal Medicine
Neurology
Plastic Surgery
Vascular Surgery
Internal Medicine

## 2023-02-06 NOTE — DISCHARGE NOTE NURSING/CASE MANAGEMENT/SOCIAL WORK - NSDCPEFALRISK_GEN_ALL_CORE
For information on Fall & Injury Prevention, visit: [email or web address] OR  [email or web address] OR  https://www.cdc.gov/steadi/patient.html

## 2023-02-06 NOTE — DISCHARGE NOTE PROVIDER - CARE PROVIDER_API CALL
Lawrence Dow (MD)  Plastic Surgery; Surgery of the Hand  [address] [facility], [address]  [place] [address]  Phone: [phone]  Fax: [phone]  Follow Up Time:     Braxton Forde)  Neurology; Vascular Neurology  [address] [facility] [address], [address]  [place] [address]  Phone: [phone]  Fax: [phone]  Follow Up Time:

## 2023-02-06 NOTE — PROGRESS NOTE ADULT - SUBJECTIVE AND OBJECTIVE BOX
Neurology Progress Note    S: Patient seen and examined. No new events overnight. patient denied CP, SOB, HA or pain.     Medication:  MEDICATIONS  (STANDING):  aspirin enteric coated 81 milliGRAM(s) Oral daily  atorvastatin 40 milliGRAM(s) Oral at bedtime  chlorhexidine 2% Cloths 1 Application(s) Topical daily  enoxaparin Injectable 80 milliGRAM(s) SubCutaneous every 12 hours  lacosamide IVPB 100 milliGRAM(s) IV Intermittent every 12 hours  levETIRAcetam  IVPB 1500 milliGRAM(s) IV Intermittent every 12 hours  lidocaine   4% Patch 1 Patch Transdermal daily  multivitamin 1 Tablet(s) Oral daily  mupirocin 2% Ointment 1 Application(s) Topical two times a day  pantoprazole  Injectable 40 milliGRAM(s) IV Push two times a day  phenazopyridine 100 milliGRAM(s) Oral every 8 hours  polyethylene glycol 3350 17 Gram(s) Oral two times a day  senna 2 Tablet(s) Oral at bedtime  sodium chloride 0.9%. 1000 milliLiter(s) (75 mL/Hr) IV Continuous <Continuous>    MEDICATIONS  (PRN):  HYDROmorphone  Injectable 1 milliGRAM(s) IV Push every 6 hours PRN severe breakthrough pain  oxyCODONE    IR 5 milliGRAM(s) Oral every 6 hours PRN Severe Pain (7 - 10)    Vitals:      Vital Signs Last 24 Hrs  T(C): 36.5 (02-06-23 @ 05:06), Max: 36.8 (02-05-23 @ 20:42)  T(F): 97.7 (02-06-23 @ 05:06), Max: 98.2 (02-05-23 @ 20:42)  HR: 70 (02-06-23 @ 05:06) (70 - 74)  BP: 116/80 (02-06-23 @ 05:06) (102/68 - 129/66)  BP(mean): --  RR: 18 (02-06-23 @ 05:06) (18 - 18)  SpO2: 98% (02-06-23 @ 05:06) (98% - 100%)        General Exam:   General Appearance: Appropriately dressed and in no acute distress       Head: Normocephalic, atraumatic and no dysmorphic features  Ear, Nose, and Throat: Moist mucous membranes  CVS: S1S2+  Resp: No SOB, no wheeze or rhonchi  Abd: soft NTND  Extremities: No edema, no cyanosis  Skin: No bruises, no rashes     Neurological Exam:  Mental Status: Awake, alert and oriented x 3.  Able to follow simple and complex verbal commands. Able to name and repeat. fluent speech. No obvious aphasia or dysarthria noted.   Cranial Nerves: PERRL, EOMI, VFFC, sensation V1-V3 intact,  no obvious facial asymmetry , equal elevation of palate, scm/trap 5/5, tongue is midline on protrusion. no obvious papilledema on fundoscopic exam. Hearing is grossly intact.   Motor: Normal bulk, tone and strength throughout upperse. LLE in brace s/p OR. some distal limb movement. RLE 4+/5   Sensation: Intact to light touch and pinprick throughout. no right/left confusion. no extinction to tactile on DSS.   Reflexes: 1+ throughout at biceps, brachioradialis, triceps, patellars and ankles bilaterally and equal. No clonus. R toe and L toe were both downgoing.  Coordination: No dysmetria on FNF    Gait: deferred     I personally reviewed the below data/images/labs:  CBC Full  -  ( 05 Feb 2023 06:00 )  WBC Count : 9.04 K/uL  RBC Count : 3.21 M/uL  Hemoglobin : 9.5 g/dL  Hematocrit : 30.5 %  Platelet Count - Automated : 399 K/uL  Mean Cell Volume : 95.0 fL  Mean Cell Hemoglobin : 29.6 pg  Mean Cell Hemoglobin Concentration : 31.1 gm/dL  Auto Neutrophil # : x  Auto Lymphocyte # : x  Auto Monocyte # : x  Auto Eosinophil # : x  Auto Basophil # : x  Auto Neutrophil % : x  Auto Lymphocyte % : x  Auto Monocyte % : x  Auto Eosinophil % : x  Auto Basophil % : x    02-05    139  |  103  |  10  ----------------------------<  94  4.1   |  26  |  0.70    Ca    8.7      05 Feb 2023 06:00  Phos  4.3     02-05  Mg     1.90     02-05    TPro  6.1  /  Alb  2.9<L>  /  TBili  0.3  /  DBili  x   /  AST  16  /  ALT  19  /  AlkPhos  84  02-05    MRI:   IMPRESSION:    Chronic infarcts of the right frontal subcortical and right centrum   semiovale. No new areas of infarct are identified. Unchanged scattered   foci of hemosiderin.    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   Differential diagnosis includes infection, sarcoidosis, lymphoma and   CLIPPERS (chronic lymphocytic inflammation with pontine perivascular   enhancement unresponsive to steroids).    --- End of Report ---    < end of copied text >  < from: MR Head w/wo IV Cont (12.29.22 @ 13:39) >  IMPRESSION:    Chronic infarcts of the right frontal subcortical and right centrum   semiovale. No new areas of infarct are identified. Unchanged scattered   foci of hemosiderin.    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   Differential diagnosis includes infection, sarcoidosis, lymphoma and   CLIPPERS (chronic lymphocytic inflammation with pontine perivascular   enhancement unresponsive to steroids).    --- End of Report ---    < end of copied text >  < from: MR Cervical Spine w/wo IV Cont (01.24.23 @ 08:43) >    ACC: [id number] EXAM:  MR SPINE CERVICAL WAW IC   ORDERED BY: GABBIE HUTCHINS     ACC: [id number] EXAM:  MR BRAIN WAW IC   ORDERED BY: GABBIE HUTCHINS     PROCEDURE DATE:  01/24/2023          INTERPRETATION:  CLINICAL INFORMATION: Altered mental status.    TECHNIQUE: Multisequence, multiplanar MRI of the brain and cervical spine   was obtained before and after the administration of intravenous contrast.    IV Contrast Administered: 8 cc Gadavist.  IV Contrast Discarded: 2 cc    COMPARISON: MRI brain 12/29/2022    FINDINGS:    MRI BRAIN:    There is no intraparenchymal hematoma, mass effect or midline shift.   There are a few foci of increased signal on T2/FLAIR involving the right   frontal subcortical region and right centrum semiovale, representing the   sequela of known prior infarcts. No new areas of infarct are identified.    Areas of contrast enhancement in the bilateral lentiform nucleus, thalami   and alexis appear less conspicuous/improved compared with prior study. Mild   associated increased T2 signal is unchanged to mildly improved.    No abnormal extra-axial fluid collections are present. There is no   diffusion abnormality to suggest acute or subacute infarction. Major   flow-voids at the base of the brain follow expected course and contour.   No abnormal enhancement on post-contrast images.    Proportional prominence of the sulci and ventricles, within normal limits   for the patient's age.    The calvarium is intact. The visualized intraorbital compartments are  intact. The paranasal sinuses are clear. The tympanomastoid cavities   appear free of acute disease.    MRI CERVICAL SPINE:    There is normal cervical lordosis. The vertebral bodies are of normal   height and configuration. The intervertebral discs spaces are within   normal limits. Right uncinate hypertrophy/right paracentral disc   osteophyte complex involving the C3-C4 level and a disc osteophyte   complex involving the C6-C7 level results in mild central narrowing.   Bilateral uncovertebral degenerative changes at C5-6 results in mild   bilateral neural foramina narrowing. The marrow signal is within normal   limits.  No prevertebral or paraspinal edema.    There is no cord compression or cord impingement. There is no epidural   collection.    There is faint enhancement involving the visualized portions of the alexis   and medulla and superior most cervical cord. Evaluation of the remainder   of the spinal cord demonstrates no evidence of abnormal signal changes or   abnormal enhancement.    IMPRESSION:    Areas of contrast enhancement in the bilateral lentiform nucleus, thalami   and alexis appear less conspicuous / improved compared with prior study.   There is minimal involvement of the upper cervical cord. Differential   includes meningothelial hyperplasia, neurosarcoidosis, lymphoma,   vasculitis and infection. Lower suspicion for CLIPPERS (chronic   lymphocytic inflammation with pontine perivascular enhancement responsive   to steroids) given the distribution.    Chronic infarcts involving the right frontal subcortical region and   right-sided semiovale. No new areas of infarct are identified.    --- End of Report ---       STEPHEN DE JESUS MD; Resident Radiology  This document has been electronically signed.  LEIGHANN JJ MD; Attending Radiologist  This document has been electronically signed. Jan 24 2023  1:32PM    < end of copied text >

## 2023-02-06 NOTE — PROGRESS NOTE ADULT - ASSESSMENT
Patient is a 52-year-old male with a past medical history of CVA/TIA, ASD/PFO?, hyperlipidemia presents emerged department today with altered mental status from his nursing facility.  Patient just had a stay in this hospital when he was found unresponsive at home.  An extensive work-up with no significant etiology found.  There were thoughts that it was related to seizure activity however neurology did not find any seizure activity on their work-up.  Patient went to a nursing facility and rehab.  Was doing well until yesterday his parents noted him to be somewhat confused and having word finding difficulties.  Today his speech was worse when noticed by the wife.  He is also noted to have odd behavior where he was pulling at sheets and the strings of the masks.  He did not have this behavior before.  Only residual deficit from his previous strokes with some random word finding abilities however his speech today is reported to be significantly worse with worse word finding.  The patient denies any pain at this time.  He does acknowledge that he is having trouble speaking.  There is also report from the family and patient is complaining of left calf pain.  This pain is much more severe than it has been in the past.    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, SP LP, no malignancy on cyto   tele monitoring   brain MRI per Neuro recs, noted, follow up neuro recs , discussed with neurosurg, no plan for biopsy at this time     # Lekucytosis  Pan CX   Infectious W/U   CTA negative   ID eval appreciated    Hematology eval appreciated, follow up recs     #  Compartment syndrome   S/P Fasciotomy   Vascular follow up appreciated   dressing per vascular follow up recs   ID eval appreciated, cont zosyn, total of 7 days   as per vascualr, no need for BKLA>alive muscle, wound care  plastic surg eval called for possible flap , appreciated recs, wound vac care   resume AC , tolerating         # Elevated LFTs  resolved  resume statin         # ANemia  Occult negative  Total of 3 units PRBC  serial CBC   GI follow up appreciated         DIscussed in detail with patient's partner over the phone. Ms swenson       D/C planing

## 2023-02-06 NOTE — PROGRESS NOTE ADULT - ASSESSMENT
52-year-old  M  TIAs hx TIAS and strokes initially thought to be 2/2 testosterone with recent admission for seizures and AMS here with confusion. PE with psychomotor slowing, aphasia frontal reflexes bilaterally distal LE weakness here iwth AMS, found to have a DVT iun the LLE MRi on 12/29/22 showed chronic right frontalm centrum semiovale strokes and bilateral lentiform and pontine enhancemeent  -s/p fasciotomy for left leg compartment syndrome 1/18  s/p wash out on 1/23   vEEG neg   MRI brain and C spine done 1/24/23: chronic R frontal and R semiovale infarcts. contrast enhancement imporved.  DDx c/f neurosarcoid, lymphoma, vasculitis, less suspicion for CLIPPERS     Impression: AMS with bilateral pontine and lentiform enhancement ? Clippers vs other; LLE 2/2 compartment syndrome     -LP with extensive CSF studies unrevealing  -Body imaging unrevealing, pelvic LNS not amenable to BX as per IR  - nsx no intervention at this time   - no role for hgih dose steroids   -Would reach out to NSX if amenable to biopsy  -On Keppra 1500 mg BID and Vimpat 100 mg BID  outpatient hypercoag workup neg   - heparin drip. statin held given rise in LFTs; changed to full dose lovenox  dcp  in progress   Braxton Forde MD  Vascular Neurology  Office: [phone]

## 2023-02-06 NOTE — DISCHARGE NOTE PROVIDER - NSDCMRMEDTOKEN_GEN_ALL_CORE_FT
aspirin 81 mg oral delayed release tablet: 1 tab(s) orally once a day  atorvastatin 40 mg oral tablet: 1 tab(s) orally once a day  bisacodyl 10 mg rectal suppository: 1 suppository(ies) rectal once a day  enoxaparin: 100 milligram(s) subcutaneous 2 times a day  Keppra: 1500 milligram(s) orally 2 times a day  lidocaine 4% topical film: Apply topically to affected area once a day  Multiple Vitamins oral tablet: 1 tab(s) orally once a day  oxyCODONE 5 mg oral tablet: 1 tab(s) orally every 6 hours, As needed, moderate and severe pain  traMADol 50 mg oral tablet: 0.5 tab(s) orally every 6 hours, As needed, Moderate Pain (4 - 6)  Vimpat 100 mg oral tablet: 1 tab(s) orally 2 times a day   aspirin 81 mg oral delayed release tablet: 1 tab(s) orally once a day  atorvastatin 40 mg oral tablet: 1 tab(s) orally once a day  enoxaparin: 80 milligram(s) subcutaneous 2 times a day  Keppra: 1500 milligram(s) orally 2 times a day  lidocaine 4% topical film: Apply topically to affected area once a day  Multiple Vitamins oral tablet: 1 tab(s) orally once a day  oxyCODONE 5 mg oral tablet: 1 tab(s) orally every 6 hours, As needed, Severe Pain (7 - 10)  pantoprazole 40 mg intravenous injection: 40 milligram(s) intravenous 2 times a day  phenazopyridine 100 mg oral tablet: 1 tab(s) orally every 8 hours  polyethylene glycol 3350 oral powder for reconstitution: 17 gram(s) orally 2 times a day  senna leaf extract oral tablet: 2 tab(s) orally once a day (at bedtime)  Vimpat 100 mg oral tablet: 1 tab(s) orally 2 times a day

## 2023-02-06 NOTE — DISCHARGE NOTE PROVIDER - NSDCDCMDCOMP_GEN_ALL_CORE
Oriented - self; Oriented - place; Oriented - time
This document is complete and the patient is ready for discharge.

## 2023-02-06 NOTE — DISCHARGE NOTE NURSING/CASE MANAGEMENT/SOCIAL WORK - PATIENT PORTAL LINK FT
You can access the FollowMyHealth Patient Portal offered by [facility] by registering at the following website: [email or web address]. By joining [employer]’s FollowMyHealth portal, you will also be able to view your health information using other applications (apps) compatible with our system.

## 2023-02-06 NOTE — DISCHARGE NOTE PROVIDER - HOSPITAL COURSE
52-year-old male with a past medical history of CVA/TIA, ASD/PFO?, hyperlipidemia presents emerged department today with altered mental status from his nursing facility.  Patient just had a stay in this hospital when he was found unresponsive at home.  An extensive work-up with no significant etiology found.  There were thoughts that it was related to seizure activity however neurology did not find any seizure activity on their work-up.  Patient went to a nursing facility and rehab.  Was doing well until yesterday his parents noted him to be somewhat confused and having word finding difficulties.  Today his speech was worse when noticed by the wife.  He is also noted to have odd behavior where he was pulling at sheets and the strings of the masks.  He did not have this behavior before.  Only residual deficit from his previous strokes with some random word finding abilities however his speech today is reported to be significantly worse with worse word finding.  The patient denies any pain at this time.  He does acknowledge that he is having trouble speaking.  There is also report from the family and patient is complaining of left calf pain.  This pain is much more severe than it has been in the past.    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, SP LP, no malignancy on cyto   tele monitoring   brain MRI per Neuro recs, noted, follow up neuro recs , discussed with neurosurg, no plan for biopsy at this time     # Lekucytosis  Pan CX   Infectious W/U   CTA negative   ID eval appreciated    Hematology eval appreciated, follow up recs     # Compartment syndrome   S/P Fasciotomy   Vascular follow up appreciated   dressing per vascular follow up recs   ID eval appreciated, cont zosyn, total of 7 days   as per vascualr, no need for BKLA>alive muscle, wound care  plastic surg eval called for possible flap , appreciated recs, wound vac care   resume AC , tolerating     # Elevated LFTs  resolved  resume statin     # Anemia  Occult negative  Total of 3 units PRBC  serial CBC   GI follow up appreciated

## 2023-02-06 NOTE — ADVANCED PRACTICE NURSE CONSULT - REASON FOR CONSULT
As per discussion with Abby, patient to be discharged to [facility] today.     Please contact Wound/Ostomy Care Service Line if we can be of further assistance (ext [phone]).

## 2023-02-06 NOTE — PROGRESS NOTE ADULT - SUBJECTIVE AND OBJECTIVE BOX
Name of Patient : TAMI DUNHAM  MRN: [id number]  Date of visit: 23 @ 15:01      Subjective: Patient seen and examined. No new events except as noted.   doing okay  D/C planing     REVIEW OF SYSTEMS:    CONSTITUTIONAL: No weakness, fevers or chills  EYES/ENT: No visual changes;  No vertigo or throat pain   NECK: No pain or stiffness  RESPIRATORY: No cough, wheezing, hemoptysis; No shortness of breath  CARDIOVASCULAR: No chest pain or palpitations  GASTROINTESTINAL: No abdominal or epigastric pain. No nausea, vomiting, or hematemesis; No diarrhea or constipation. No melena or hematochezia.  GENITOURINARY: No dysuria, frequency or hematuria  NEUROLOGICAL: No numbness or weakness  SKIN: No itching, burning, rashes, or lesions   All other review of systems is negative unless indicated above.    MEDICATIONS:  MEDICATIONS  (STANDING):  aspirin enteric coated 81 milliGRAM(s) Oral daily  atorvastatin 40 milliGRAM(s) Oral at bedtime  chlorhexidine 2% Cloths 1 Application(s) Topical daily  enoxaparin Injectable 80 milliGRAM(s) SubCutaneous every 12 hours  lacosamide IVPB 100 milliGRAM(s) IV Intermittent every 12 hours  levETIRAcetam  IVPB 1500 milliGRAM(s) IV Intermittent every 12 hours  lidocaine   4% Patch 1 Patch Transdermal daily  multivitamin 1 Tablet(s) Oral daily  mupirocin 2% Ointment 1 Application(s) Topical two times a day  pantoprazole  Injectable 40 milliGRAM(s) IV Push two times a day  phenazopyridine 100 milliGRAM(s) Oral every 8 hours  polyethylene glycol 3350 17 Gram(s) Oral two times a day  senna 2 Tablet(s) Oral at bedtime  sodium chloride 0.9%. 1000 milliLiter(s) (75 mL/Hr) IV Continuous <Continuous>      PHYSICAL EXAM:  T(C): 36.8 (23 @ 12:15), Max: 36.8 (23 @ 20:42)  HR: 79 (23 @ 12:15) (70 - 79)  BP: 121/75 (23 @ 12:15) (116/80 - 129/66)  RR: 18 (23 @ 12:15) (18 - 18)  SpO2: 98% (23 @ 12:15) (98% - 100%)  Wt(kg): --  I&O's Summary    2023 07:01  -  2023 07:00  --------------------------------------------------------  IN: 300 mL / OUT: 1150 mL / NET: -850 mL          Appearance: Normal	  HEENT:  PERRLA   Lymphatic: No lymphadenopathy   Cardiovascular: Normal S1 S2, no JVD  Respiratory: normal effort , clear  Gastrointestinal:  Soft, Non-tender  Skin: No rashes,  warm to touch  Psychiatry:  Mood & affect appropriate  Musculuskeletal: No edema    recent labs, Imaging and EKGs personally reviewed     23 @ 07:01  -  23 @ 07:00  --------------------------------------------------------  IN: 300 mL / OUT: 1150 mL / NET: -850 mL                          9.5    9.04  )-----------( 399      ( 2023 06:00 )             30.5               02-05    139  |  103  |  10  ----------------------------<  94  4.1   |  26  |  0.70    Ca    8.7      2023 06:00  Phos  4.3     02-05  Mg     1.90     02-05    TPro  6.1  /  Alb  2.9<L>  /  TBili  0.3  /  DBili  x   /  AST  16  /  ALT  19  /  AlkPhos  84  02-05                       Urinalysis Basic - ( 2023 16:40 )    Color: Light Yellow / Appearance: Clear / S.010 / pH: x  Gluc: x / Ketone: Negative  / Bili: Negative / Urobili: <2 mg/dL   Blood: x / Protein: Negative / Nitrite: Negative   Leuk Esterase: Negative / RBC: x / WBC x   Sq Epi: x / Non Sq Epi: x / Bacteria: x

## 2023-02-08 NOTE — DISCHARGE NOTE NURSING/CASE MANAGEMENT/SOCIAL WORK - NURSING SECTION COMPLETE
Patient/Caregiver provided printed discharge information. Complex Repair And Modified Advancement Flap Text: The defect edges were debeveled with a #15 scalpel blade.  The primary defect was closed partially with a complex linear closure.  Given the location of the remaining defect, shape of the defect and the proximity to free margins a modified advancement flap was deemed most appropriate for complete closure of the defect.  Using a sterile surgical marker, an appropriate advancement flap was drawn incorporating the defect and placing the expected incisions within the relaxed skin tension lines where possible.    The area thus outlined was incised deep to adipose tissue with a #15 scalpel blade.  The skin margins were undermined to an appropriate distance in all directions utilizing iris scissors.

## 2023-03-01 NOTE — ED CDU PROVIDER INITIAL DAY NOTE - WR ORDER DATE AND TIME 1
04-Nov-2022 19:45 Wartpeel Counseling:  I discussed with the patient the risks of Wartpeel including but not limited to erythema, scaling, itching, weeping, crusting, and pain.

## 2023-03-16 ENCOUNTER — APPOINTMENT (OUTPATIENT)
Dept: VASCULAR SURGERY | Facility: CLINIC | Age: 53
End: 2023-03-16

## 2023-03-16 NOTE — ED CDU PROVIDER DISPOSITION NOTE - WR ORDER ID 1
Time: 3:40 PM EDT  Date of encounter:  3/16/2023  Independent Historian/Clinical History and Information was obtained by:   Patient  Chief Complaint: dyspnea    History is limited by: N/A    History of Present Illness:  Patient is a 65 y.o. year old male who presents to the emergency department for evaluation of dyspnea. Patient reports of left arm and left leg swelling. Patient reports of mild cough. Patient states that his symptoms have been present for about a week. Patient states that the left leg pain is 10/10 at rest and with activity. Patient states that he is noticing more swelling than usual. Patient states that he takes Lasix and is compliant. Patient reports of left hand and left wrist pain. Patient states that he has run out of bumetanide and metolazone.     HPI    Patient Care Team  Primary Care Provider: Heidi Villa APRN    Past Medical History:     No Known Allergies  Past Medical History:   Diagnosis Date   • Abnormal kidney function    • Arthritis    • Bursitis    • CHF (congestive heart failure) (HCC)    • Depression    • Diabetes (HCC)    • Edema    • Essential hypertension 9/23/2021   • Forgetfulness    • Head injury    • Hernia cerebri (HCC)    • Hyperlipidemia    • Hypertension    • IFG (impaired fasting glucose)    • Low back pain    • Lumbago     `   • Pain of left hip    • Right shoulder pain    • Sleep apnea    • SOB (shortness of breath)      Past Surgical History:   Procedure Laterality Date   • CYST REMOVAL Right     leg     Family History   Problem Relation Age of Onset   • No Known Problems Father    • Diabetes Sister    • Stroke Sister        Home Medications:  Prior to Admission medications    Medication Sig Start Date End Date Taking? Authorizing Provider   aspirin (aspirin) 81 MG EC tablet Take 1 tablet by mouth Daily. 6/2/22   Heidi Villa APRN   atorvastatin (LIPITOR) 80 MG tablet  3/5/23   Provider, MD Quentin   carvedilol (COREG) 12.5 MG tablet Take 1 tablet by mouth  "2 (Two) Times a Day With Meals. 1/11/22   Brigido Mcdonnell MD   cholecalciferol (VITAMIN D3) 25 MCG (1000 UT) tablet Take 1 tablet by mouth Daily. 8/26/22   Nehal Daniels MD   sacubitril-valsartan (Entresto)  MG tablet Take 1 tablet by mouth 2 (Two) Times a Day. 1/11/22   Brigido Mcdonnell MD   sitaGLIPtin-metFORMIN (Janumet)  MG per tablet Take 1 tablet by mouth 2 (Two) Times a Day With Meals. 9/27/22   Aysha Lacey, PAWillamC   spironolactone (ALDACTONE) 25 MG tablet Take 25 mg by mouth Daily. 10/12/22   Provider, MD Quentin        Social History:   Social History     Tobacco Use   • Smoking status: Former     Packs/day: 0.25     Types: Cigarettes     Start date: 1982     Quit date: 8/14/2012     Years since quitting: 10.5   • Smokeless tobacco: Never   Vaping Use   • Vaping Use: Never used   Substance Use Topics   • Alcohol use: Yes     Comment: rare   • Drug use: Never         Review of Systems:  Review of Systems   Constitutional: Negative for chills and fever.   HENT: Negative for congestion, rhinorrhea and sore throat.    Eyes: Negative for pain and visual disturbance.   Respiratory: Positive for cough and shortness of breath. Negative for apnea and chest tightness.    Cardiovascular: Positive for leg swelling. Negative for chest pain and palpitations.   Gastrointestinal: Negative for abdominal pain, diarrhea, nausea and vomiting.   Genitourinary: Negative for difficulty urinating and dysuria.   Musculoskeletal: Negative for joint swelling and myalgias.   Skin: Negative for color change.   Neurological: Negative for seizures and headaches.   Psychiatric/Behavioral: Negative.    All other systems reviewed and are negative.       Physical Exam:  /84 (BP Location: Right arm, Patient Position: Sitting)   Pulse 100   Temp 98.4 °F (36.9 °C) (Oral)   Resp 18   Ht 167.6 cm (65.98\")   SpO2 100%   BMI 35.53 kg/m²     Physical Exam  Vitals and nursing note reviewed.   Constitutional:      "  General: He is not in acute distress.     Appearance: Normal appearance. He is not toxic-appearing.   HENT:      Head: Normocephalic and atraumatic.      Mouth/Throat:      Mouth: Mucous membranes are moist.   Eyes:      General: No scleral icterus.  Cardiovascular:      Rate and Rhythm: Normal rate and regular rhythm.      Pulses: Normal pulses.           Radial pulses are 2+ on the right side and 2+ on the left side.      Heart sounds: Normal heart sounds. No murmur heard.    No friction rub.   Pulmonary:      Effort: Pulmonary effort is normal. No respiratory distress.      Breath sounds: Normal breath sounds. No decreased breath sounds, wheezing, rhonchi or rales.      Comments: Mild crackles at the lung bases   Abdominal:      General: Abdomen is flat. Bowel sounds are normal. There is no distension.      Palpations: Abdomen is soft.      Tenderness: There is no abdominal tenderness. There is no guarding or rebound.   Musculoskeletal:         General: Normal range of motion.      Left wrist: Swelling present.      Left hand: Swelling present.      Cervical back: Normal range of motion and neck supple.      Right lower le+ Edema present.      Left lower le+ Edema present.      Comments: 2+ edema on left hand and left wrist  No calf tenderness      Skin:     General: Skin is warm and dry.      Comments: Normal capillary refill    Neurological:      Mental Status: He is alert and oriented to person, place, and time. Mental status is at baseline.                  Procedures:  Procedures      Medical Decision Making:      Comorbidities that affect care:    Congestive Heart Failure, Diabetes, Hypertension    External Notes reviewed:    Previous Clinic Note: patient seen at Urgent Care on 3/9/23 for swelling of left hand and Hospital Discharge summary: patient discharged on 23 and diagnosed with stage 3 chronic kidney disease and acute on chronic systolic heart failure      The following orders were  placed and all results were independently analyzed by me:  Orders Placed This Encounter   Procedures   • XR Chest 1 View   • [place] Draw   • Comprehensive Metabolic Panel   • BNP   • Single High Sensitivity Troponin T   • CBC Auto Differential   • Single High Sensitivity Troponin T   • NPO Diet NPO Type: Strict NPO   • Undress & Gown   • Cardiac Monitoring   • Continuous Pulse Oximetry   • Vital Signs   • Hospitalist (on-call MD unless specified)   • Oxygen Therapy- Nasal Cannula; 2 LPM; Titrate for SPO2: equal to or greater than, 92%   • ECG 12 Lead ED Triage Standing Order; SOA   • Insert Peripheral IV   • CBC & Differential   • Green Top (Gel)   • Lavender Top   • Gold Top - SST   • Light Blue Top       Medications Given in the Emergency Department:  Medications   sodium chloride 0.9 % flush 10 mL (has no administration in time range)   furosemide (LASIX) injection 80 mg (80 mg Intravenous Given 3/16/23 1609)   HYDROcodone-acetaminophen (NORCO) 7.5-325 MG per tablet 1 tablet (1 tablet Oral Given 3/16/23 1608)        ED Course:    ED Course as of 03/16/23 1742   Thu Mar 16, 2023   1508 [facility](!): 25.4 [AJ]   1542 EKG: I reviewed and interpreted his twelve-lead EKG is sinus rhythm at 98 bpm, normal P waves, normal QRS, there are mild ST depressions and T wave inversions diffusely concerning for possible ischemia. [VS]   1542 Today's EKG is unchanged from his old EKG on January 25, 2023. [VS]      ED Course User Index  [AJ] Maira López PA-C  [VS] Pete Cruz MD       Labs:    Lab Results (last 24 hours)     Procedure Component Value Units Date/Time    CBC & Differential [[id number]]  (Abnormal) Collected: 03/16/23 1249    Specimen: Blood Updated: 03/16/23 1305    Narrative:      The following orders were created for panel order CBC & Differential.  Procedure                               Abnormality         Status                     ---------                               -----------         ------                      CBC Auto Differential[[id number]]        Abnormal            Final result                 Please view results for these tests on the individual orders.    Comprehensive Metabolic Panel [[id number]]  (Abnormal) Collected: 03/16/23 1249    Specimen: Blood Updated: 03/16/23 1326     Glucose 256 mg/dL      BUN 20 mg/dL      Creatinine 1.60 mg/dL      Sodium 130 mmol/L      Potassium 4.0 mmol/L      Chloride 98 mmol/L      CO2 24.3 mmol/L      Calcium 8.3 mg/dL      Total Protein 6.0 g/dL      Albumin 2.6 g/dL      ALT (SGPT) 22 U/L      AST (SGOT) 10 U/L      Alkaline Phosphatase 164 U/L      Total Bilirubin 0.6 mg/dL      Globulin 3.4 gm/dL      A/G Ratio 0.8 g/dL      BUN/Creatinine Ratio 12.5     Anion Gap 7.7 mmol/L      eGFR 47.5 mL/min/1.73     Narrative:      GFR Normal >60  Chronic Kidney Disease <60  Kidney Failure <15      BNP [[id number]]  (Abnormal) Collected: 03/16/23 1249    Specimen: Blood Updated: 03/16/23 1324     proBNP 20,275.0 pg/mL     Narrative:      Among patients with dyspnea, NT-proBNP is highly sensitive for the detection of acute congestive heart failure. In addition NT-proBNP of <300 pg/ml effectively rules out acute congestive heart failure with 99% negative predictive value.      Single High Sensitivity Troponin T [[id number]]  (Abnormal) Collected: 03/16/23 1249    Specimen: Blood Updated: 03/16/23 1337     HS Troponin T 101 ng/L     Narrative:      High Sensitive Troponin T Reference Range:  <10.0 ng/L- Negative Female for AMI  <15.0 ng/L- Negative Male for AMI  >=10 - Abnormal Female indicating possible myocardial injury.  >=15 - Abnormal Male indicating possible myocardial injury.   Clinicians would have to utilize clinical acumen, EKG, Troponin, and serial changes to determine if it is an Acute Myocardial Infarction or myocardial injury due to an underlying chronic condition.         CBC Auto Differential [[id number]]  (Abnormal) Collected: 03/16/23 1249    Specimen: Blood  [id number] Updated: 03/16/23 1305     WBC 10.90 10*3/mm3      RBC 4.10 10*6/mm3      Hemoglobin 10.4 g/dL      Hematocrit 33.0 %      MCV 80.5 fL      MCH 25.4 pg      MCHC 31.5 g/dL      RDW 18.9 %      RDW-SD 53.9 fl      MPV 8.7 fL      Platelets 394 10*3/mm3      Neutrophil % 80.6 %      Lymphocyte % 11.1 %      Monocyte % 5.4 %      Eosinophil % 1.9 %      Basophil % 0.4 %      Immature Grans % 0.6 %      Neutrophils, Absolute 8.79 10*3/mm3      Lymphocytes, Absolute 1.21 10*3/mm3      Monocytes, Absolute 0.59 10*3/mm3      Eosinophils, Absolute 0.21 10*3/mm3      Basophils, Absolute 0.04 10*3/mm3      Immature Grans, Absolute 0.06 10*3/mm3      nRBC 0.0 /100 WBC     Single High Sensitivity Troponin T [[id number]]  (Abnormal) Collected: 03/16/23 1555    Specimen: Blood Updated: 03/16/23 1632     HS Troponin T 89 ng/L     Narrative:      High Sensitive Troponin T Reference Range:  <10.0 ng/L- Negative Female for AMI  <15.0 ng/L- Negative Male for AMI  >=10 - Abnormal Female indicating possible myocardial injury.  >=15 - Abnormal Male indicating possible myocardial injury.   Clinicians would have to utilize clinical acumen, EKG, Troponin, and serial changes to determine if it is an Acute Myocardial Infarction or myocardial injury due to an underlying chronic condition.                Imaging:    XR Chest 1 View    Result Date: 3/16/2023  PROCEDURE: XR CHEST 1 VW  COMPARISON: [facility], , XR CHEST 1 VW, 1/25/2023, 16:20.  INDICATIONS: SOB  FINDINGS:  No new consolidations or pleural effusions are observed. The cardiac silhouette and mediastinum are unchanged. No definitive acute osseous abnormalities are seen on this single view.        1. No change from the previous study with no evidence for acute cardiopulmonary process.         RAY AVILA MD       Electronically Signed and Approved By: RAY AVILA MD on 3/16/2023 at 13:24                 Differential Diagnosis and Discussion:    Dyspnea:  Differential diagnosis includes but is not limited to metabolic acidosis, neurological disorders, psychogenic, asthma, pneumothorax, upper airway obstruction, COPD, pneumonia, noncardiogenic pulmonary edema, interstitial lung disease, anemia, congestive heart failure, and pulmonary embolism    All labs were reviewed and interpreted by me.  All X-rays were independently reviewed by me.  EKG was interpreted by me.    MDM   I confirmed with ED pharmacy tech that patient uses [facility] for prescriptions, and 16 days ago he ran out of all of his water pills.                  This patient is a pleasant but somewhat chronically debilitated 65-year-old male with history of CHF who was recently discharged from the hospital about 6 weeks ago on Bumex and metolazone and spironolactone diuretics and essentially ran out of all of his meds a few weeks ago and now presents with CHF exacerbation causing significant dyspnea around the house with minimal exertion and also essentially pitting lower extremity edema and also some edema of the left hand and wrist.    I did get Doppler ultrasound of the left upper extremity which was negative and Doppler ultrasound of the left leg where he was complaining of pain, came back positive only for isolated calf vein DVT, so I did not start him on anticoagulation at this time.    I started diuresing him in the ED with IV Lasix 80 mg.    I given some Norco for pain.    I had the ED  also speak with him and it sounds like he has significant issues with transportation and finances and unable to get meds refilled.    Given his acute CHF exacerbation and currently noncompliance due to financial and transportation issues I think he may benefit from inpatient observation admission for some IV diuretics and to help arrange further management or outpatient resources.                  Patient Care Considerations:          Consultants/Shared Management Plan:    Hospitalist: I have discussed the  case with The admitting hospitalist who agrees to accept the patient for admission.    Social Determinants of Health:    Patient is independent, reliable, and has access to care.       Disposition and Care Coordination:    Admit:   Through independent evaluation of the patient's history, physical, and imperical data, the patient meets criteria for observation/admission to the hospital.        Final diagnoses:   Acute on chronic congestive heart failure, unspecified heart failure type (HCC)   Dyspnea on exertion   Acute deep vein thrombosis (DVT) of calf muscle vein of left lower extremity (HCC)   Elevated troponin level not due myocardial infarction   Noncompliance with medication regimen        ED Disposition     ED Disposition   Decision to Admit    Condition   --    Comment   --             This medical record created using voice recognition software.        Documentation assistance provided by Tere Jimenez acting as scribe for  Pete Huntley MD . Information recorded by the scribe was done at my direction and has been verified and validated by me.       Tere Jimenez  03/16/23 2077       Tere Jimenez  03/16/23 2252       Pete Cruz MD  03/16/23 8308

## 2023-03-28 ENCOUNTER — RESULT REVIEW (OUTPATIENT)
Age: 53
End: 2023-03-28

## 2023-04-03 ENCOUNTER — APPOINTMENT (OUTPATIENT)
Dept: PLASTIC SURGERY | Facility: CLINIC | Age: 53
End: 2023-04-03
Payer: COMMERCIAL

## 2023-04-03 VITALS
TEMPERATURE: 98.1 F | OXYGEN SATURATION: 97 % | DIASTOLIC BLOOD PRESSURE: 81 MMHG | WEIGHT: 200 LBS | SYSTOLIC BLOOD PRESSURE: 119 MMHG | HEART RATE: 78 BPM | BODY MASS INDEX: 24.87 KG/M2 | HEIGHT: 75 IN

## 2023-04-03 DIAGNOSIS — M21.379 FOOT DROP, UNSPECIFIED FOOT: ICD-10-CM

## 2023-04-03 PROCEDURE — 99203 OFFICE O/P NEW LOW 30 MIN: CPT

## 2023-04-04 PROBLEM — M21.379 FOOT-DROP: Status: ACTIVE | Noted: 2023-04-03

## 2023-04-07 ENCOUNTER — RESULT REVIEW (OUTPATIENT)
Age: 53
End: 2023-04-07

## 2023-04-11 PROBLEM — Z92.89 H/O TRANSFUSION OF WHOLE BLOOD: Status: RESOLVED | Noted: 2023-04-11 | Resolved: 2023-04-11

## 2023-04-11 PROBLEM — R79.89 ELEVATED LFTS: Status: RESOLVED | Noted: 2023-04-11 | Resolved: 2023-04-11

## 2023-04-11 PROBLEM — Z86.2 HISTORY OF ANEMIA: Status: RESOLVED | Noted: 2023-04-11 | Resolved: 2023-04-11

## 2023-04-11 RX ORDER — PANTOPRAZOLE SODIUM 100 %
POWDER (GRAM) MISCELLANEOUS
Refills: 0 | Status: DISCONTINUED | COMMUNITY
End: 2023-04-11

## 2023-04-11 RX ORDER — ATORVASTATIN CALCIUM 40 MG/1
40 TABLET, FILM COATED ORAL
Qty: 90 | Refills: 3 | Status: ACTIVE | COMMUNITY
Start: 2022-09-08

## 2023-04-11 RX ORDER — ASPIRIN ENTERIC COATED TABLETS 81 MG 81 MG/1
81 TABLET, DELAYED RELEASE ORAL
Qty: 90 | Refills: 1 | Status: ACTIVE | COMMUNITY
Start: 2022-09-08

## 2023-04-13 ENCOUNTER — APPOINTMENT (OUTPATIENT)
Dept: CARDIOLOGY | Facility: CLINIC | Age: 53
End: 2023-04-13
Payer: COMMERCIAL

## 2023-04-13 ENCOUNTER — NON-APPOINTMENT (OUTPATIENT)
Age: 53
End: 2023-04-13

## 2023-04-13 VITALS
HEIGHT: 75 IN | DIASTOLIC BLOOD PRESSURE: 73 MMHG | OXYGEN SATURATION: 97 % | HEART RATE: 70 BPM | SYSTOLIC BLOOD PRESSURE: 108 MMHG | BODY MASS INDEX: 24.87 KG/M2 | WEIGHT: 200 LBS

## 2023-04-13 DIAGNOSIS — Z79.01 LONG TERM (CURRENT) USE OF ANTICOAGULANTS: ICD-10-CM

## 2023-04-13 DIAGNOSIS — R93.1 ABNORMAL FINDINGS ON DIAGNOSTIC IMAGING OF HEART AND CORONARY CIRCULATION: ICD-10-CM

## 2023-04-13 DIAGNOSIS — Q24.9 CONGENITAL MALFORMATION OF HEART, UNSPECIFIED: ICD-10-CM

## 2023-04-13 PROCEDURE — 99215 OFFICE O/P EST HI 40 MIN: CPT | Mod: 25

## 2023-04-13 PROCEDURE — 93000 ELECTROCARDIOGRAM COMPLETE: CPT

## 2023-04-13 RX ORDER — GABAPENTIN 300 MG/1
300 CAPSULE ORAL 3 TIMES DAILY
Refills: 0 | Status: ACTIVE | COMMUNITY
Start: 2023-04-13

## 2023-04-13 RX ORDER — TESTOSTERONE 30 MG/1.5ML
30 SOLUTION TOPICAL
Refills: 0 | Status: DISCONTINUED | COMMUNITY
Start: 2022-09-08 | End: 2023-04-13

## 2023-04-13 RX ORDER — POLYETHYLENE GLYCOL 3350
POWDER (GRAM) MISCELLANEOUS
Refills: 0 | Status: ACTIVE | COMMUNITY

## 2023-04-13 RX ORDER — DEXTROAMPHETAMINE SACCHARATE, AMPHETAMINE ASPARTATE, DEXTROAMPHETAMINE SULFATE, AND AMPHETAMINE SULFATE 3.75; 3.75; 3.75; 3.75 MG/1; MG/1; MG/1; MG/1
15 TABLET ORAL DAILY
Refills: 0 | Status: DISCONTINUED | COMMUNITY
Start: 2022-09-08 | End: 2023-04-13

## 2023-04-13 RX ORDER — LACOSAMIDE 100 MG/1
100 TABLET, FILM COATED ORAL TWICE DAILY
Refills: 0 | Status: ACTIVE | COMMUNITY

## 2023-04-13 RX ORDER — ACETAMINOPHEN 500 MG/1
TABLET ORAL EVERY 6 HOURS
Refills: 0 | Status: ACTIVE | COMMUNITY

## 2023-04-17 PROBLEM — Z79.01 ANTICOAGULANT LONG-TERM USE: Status: ACTIVE | Noted: 2022-09-08

## 2023-04-17 PROBLEM — R93.1 ABNORMAL ECHOCARDIOGRAM: Status: ACTIVE | Noted: 2022-10-13

## 2023-04-17 PROBLEM — Q24.9 CONGENITAL HEART DISEASE: Status: ACTIVE | Noted: 2023-04-17

## 2023-04-17 NOTE — REVIEW OF SYSTEMS
[Negative] : Heme/Lymph [Weight Loss (___ Lbs)] : [unfilled] ~Ulb weight loss [Numbness (Hypoesthesia)] : numbness [Weakness] : weakness [Speech Disturbance] : speech disturbance [Confusion] : confusion was observed [Memory Lapses Or Loss] : memory lapses or loss [Depression] : no depression [Anxiety] : anxiety [Under Stress] : under stress [Suicidal] : not suicidal

## 2023-04-18 NOTE — REASON FOR VISIT
[Other: ____] : [unfilled] [FreeTextEntry1] : History of Present Illness\par 51yo L-handed man with a PMHx significant for CVA and TIAs (02/2022 s/p tPA, residual expressive aphasia), HLD, and a septal defect (on Eliquis) presents to the ED on August 3, 2022 with difficulty using his L hand, nausea, vomiting, headache, and worsening expressive aphasia.  CTA head/neck showed no LVO. CTP showed a core of 0ml, and a R frontal penumbra of 7ml and parieto-occipital of 4ml. tPA was administered at 1051h. Patient was not a candidate for thrombectomy due to no LVO. Pt now back to baseline.  Of note, pt stated that he did not consistently take his eliquis prior to arrival at the hospital. Reported missing multiple evening doses over the past few days-weeks. 24h post-TPA CTH was obtained around 22:45, appeared stable and pt was resumed on home ASA and Eliquis.  MRI at [facility] demonstrated an abnormal.  He was started on Eliquis at that time.  Dr. Wallace placed his loop recorder.  \par \par The patients first TIA/stroke was 10 years ago (2011 and 2013).  MIMI on 2/9/2022 demonstrated a shunt without specified ASD/PFO visualized.  The right to left shunting is viewable with saline contrast.  The shunt was late and small in severity.  Dr. Wallace told him for last 6 months no evidence of atrial fibrillation or flutter. No palpitations.  No chest pain/tightness/discomfort.  Suffers from chronic extreme fatigue for which he was seen by a neurologist.  He was started on Adderral which has made a big difference.  He is being Dr. Lucia for workup of hypercoaguable conditions.\par \par =============================\par 4/13/2023\par \par

## 2023-04-18 NOTE — REASON FOR VISIT
[Other: ____] : [unfilled] [FreeTextEntry1] : History of Present Illness\par 53yo L-handed man with a PMHx significant for CVA and TIAs (02/2022 s/p tPA, residual expressive aphasia), HLD, and a septal defect (on Eliquis) presents to the ED on August 3, 2022 with difficulty using his L hand, nausea, vomiting, headache, and worsening expressive aphasia.  CTA head/neck showed no LVO. CTP showed a core of 0ml, and a R frontal penumbra of 7ml and parieto-occipital of 4ml. tPA was administered at 1051h. Patient was not a candidate for thrombectomy due to no LVO. Pt now back to baseline.  Of note, pt stated that he did not consistently take his eliquis prior to arrival at the hospital. Reported missing multiple evening doses over the past few days-weeks. 24h post-TPA CTH was obtained around 22:45, appeared stable and pt was resumed on home ASA and Eliquis.  MRI at [facility] demonstrated an abnormal.  He was started on Eliquis at that time.  Dr. Wallace placed his loop recorder.  \par \par The patients first TIA/stroke was 10 years ago (2011 and 2013).  MIMI on 2/9/2022 demonstrated a shunt without specified ASD/PFO visualized.  The right to left shunting is viewable with saline contrast.  The shunt was late and small in severity.  Dr. Wallace told him for last 6 months no evidence of atrial fibrillation or flutter. No palpitations.  No chest pain/tightness/discomfort.  Suffers from chronic extreme fatigue for which he was seen by a neurologist.  He was started on Adderral which has made a big difference.  He is being Dr. Lucia for workup of hypercoaguable conditions.\par \par =============================\par 4/13/2023\par \par

## 2023-04-18 NOTE — PATIENT PROFILE ADULT - NSTRANSFERBELONGINGSDISPO_GEN_A_NUR
[Normal] : no edema, no cyanosis, no clubbing, no varicosities [Moves all extremities] : moves all extremities [Normal Speech] : normal speech [de-identified] : no carotid artery bruits auscultated bilaterally [de-identified] : wheelchair not applicable

## 2023-04-24 ENCOUNTER — APPOINTMENT (OUTPATIENT)
Dept: WOUND CARE | Facility: CLINIC | Age: 53
End: 2023-04-24
Payer: MEDICAID

## 2023-04-24 PROCEDURE — 11042 DBRDMT SUBQ TIS 1ST 20SQCM/<: CPT

## 2023-04-24 PROCEDURE — 11045 DBRDMT SUBQ TISS EACH ADDL: CPT

## 2023-04-24 NOTE — PHYSICAL EXAM
[Normal Breath Sounds] : Normal breath sounds [2+] : left 2+ [JVD] : no jugular venous distention  [Abdomen Tenderness] : ~T ~M No abdominal tenderness [de-identified] : NAD, ambulatory [de-identified] : supple [de-identified] : AT [de-identified] : soft

## 2023-04-24 NOTE — HISTORY OF PRESENT ILLNESS
[FreeTextEntry1] : Mr. TAMI DUNHAM   presents to the office with a wound to  LLE extremity wound since 1/18/23 s/p CVA and a seizure of 12/2/22 with fever admitted at [facility], discharged to [facility].. He developed LLE swelling and was sent back to [facility] ED for a doppler which confirmed a DVT.  s/p LLE fasciotomy on 1/18/23 and then was subsequently taken back on 1/23/23 for debridement and washout.  Since then he was dc'd to [facility] rehab and has been receiving daily wound care.  He had a vac placed which was removed several weeks ago.  He was on Lovenox but was switched to Eliquis.  He has mild pain and numbness from his left calf down to his toes.  He is awaiting an EMG. Parents live in [place]. Family friend and Uncle present during office visit and involved with the care of the patient. 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

## 2023-04-24 NOTE — ASSESSMENT
[FreeTextEntry1] : Wound Assessment and Plan:\par \par The patient presents with a wound to the LLE s/p fasciotomy s/p excisional debridement today  Swelling noted to the extremity.  \par No clinical sign of infection\par Recommendation:\par \par Apply lidocaine or topical anesthetic if needed to reduce pain upon washing the wound.\par Wash wound with ----0.9% saline/ Dakins 0.125% or Dove skin sensitive soap and clean water \par Apply ---- alginate\par  ACE bandage\par Change dressing --- 3 times per week.\par Leg elevation as tolerated\par Encouraged ambulation or exercise.\par Optimization of nutrition.\par Offloading to the wound site.\par \par -----Wound supplies ordered via DME\par Patient given contact information to DME\par \par -----Homecare orders in place\par Patient has follow up appointment with neurologist for EMG and evaluation for foot brace\par for foot drop.\par Follow up appointment scheduled for 1 week\par \par TeleHealth Services discussed with the patient and/or family.  Discharge instructions given including download of [facility] information regarding:\par 1)  [facility] [employer] [facility] to obtain medical records\par 2)  [employer] [facility] to conduct Face-to-Face TeleHealth visit\par 3)  [employer] for the Patient (patient takes a picture of their wound which is sent to the patient's chart for review)\par

## 2023-04-25 ENCOUNTER — APPOINTMENT (OUTPATIENT)
Dept: RHEUMATOLOGY | Facility: CLINIC | Age: 53
End: 2023-04-25
Payer: MEDICAID

## 2023-04-25 ENCOUNTER — LABORATORY RESULT (OUTPATIENT)
Age: 53
End: 2023-04-25

## 2023-04-25 VITALS
HEIGHT: 75 IN | WEIGHT: 200 LBS | RESPIRATION RATE: 16 BRPM | BODY MASS INDEX: 24.87 KG/M2 | DIASTOLIC BLOOD PRESSURE: 83 MMHG | HEART RATE: 75 BPM | OXYGEN SATURATION: 98 % | SYSTOLIC BLOOD PRESSURE: 126 MMHG | TEMPERATURE: 97.1 F

## 2023-04-25 DIAGNOSIS — R53.1 WEAKNESS: ICD-10-CM

## 2023-04-25 PROCEDURE — 99215 OFFICE O/P EST HI 40 MIN: CPT

## 2023-04-25 NOTE — PHYSICAL EXAM
[General Appearance - Alert] : alert [General Appearance - In No Acute Distress] : in no acute distress [Sclera] : the sclera and conjunctiva were normal [Outer Ear] : the ears and nose were normal in appearance [FreeTextEntry1] : LLE bandaged,  3+/5 LLE distal plantar flexion/dorsiflexion [Skin Color & Pigmentation] : normal skin color and pigmentation

## 2023-04-25 NOTE — ASSESSMENT
[FreeTextEntry1] : TAMI DUNHAM is a 52 year  old male, seen on today  for\par Complex medical history with:  \par -> multiple TIAs&Strokes and DVT\par -> no evidence of APS or other hypercoagulable state \par -> repeated episodes of AMS\par ->  MRI brain with Areas of contrast enhancement in the bilateral lentiform nucleus, thalami and alexis concerning for malignancy,  meningothelial hyperplasia, infection, vasculitis, sarcoid, or clippers\par ->  CT abdomen showing: hepatomegaly, mildly prominent left external \par iliac lymph nodes\par \par \par While there are non rheumatologic causes of the current complex presentation, will evaluate for rheumatologic causes such as vasculitis, IgG4 related disease, and lupus.  \par Need to consider malignancy, CLIPPERS, metabolic disorder, metabolic disorders such as MELAS.  \par \par Pending repeat MRI this week to better asses progression/regression of the CNS lesions \par Pending heme eval regarding LAD and hyper-coagulable state\par \par ->  Blood work as below to look for autoimmune disease \par -> favor PET scan to look for evidence of vasculitis and for evaluation of lymph nodes seen on imaging.  Pending heme workup this week and will follow up heme evaluation \par \par More than 50% of the encounter was spent counselling  TAMI DUNHAM on differential, workup, disease course, and treatment/management.   Education was provided to TAMI DUNHAM during this encounter. All questions and concerns were answered and addressed in detail.  TAMI DUNHAM verbalized understanding and agreed to plan\par \par TAMI DUNHAM has been instructed to call for an earlier appointment if new symptoms develop \par TAMI DUNHAM has been instructed to make a follow up appointment as ttm after blood work is back \par \par \par \par

## 2023-04-25 NOTE — DATA REVIEWED
[FreeTextEntry1] : Laboratory and radiology studies that were personally reviewed at today's visit are summarized in above and below:\par :  ACL screen negative, sct - negtive, drvvt-negative, B2G screen, \par \par 1-4-2023:  CSF:  \par 12-:  CSF - negative lyme antibodies, negative ACE\par \par 12-:  Knee aspiratio:  37K WBC, no crystals, 263,000,000 RBC\par \par 12-:  RF<10, CCP negative, Prot3-negative, MPO negative, Atypical APS labs - negative, \par 11-7-202:  SHAY negative, dsdna negaitve, ssa/ssb negative, \par \par \par ---\par \par ACC: [id number] EXAM:  CT LUMBAR SPINE                      \par ACC: [id number] EXAM:  CT THORACIC SPINE                      \par \par PROCEDURE DATE:  12/02/2022  \par \par \par \par INTERPRETATION:  CT thoracic and lumbar spine without IV contrast\par \par CLINICAL INFORMATION:  Trauma  Back pain, fracture.\par \par TECHNIQUE:  Contiguous axial 2 mm sections were obtained through the \par thoracic and lumbar spine using a single helical acquisition.   \par Additional 2 mm sagittal and coronal reconstructions of the spine were \par obtained. These additional reformatted images were used to evaluate the \par spine for alignment, vertebral fractures and the integrity of the the \par posterior elements.   This scan was performed using automatic exposure \par control (radiation dose reduction software) to obtain a diagnostic image \par quality scan with patient dose as low as reasonably achievable.\par \par FINDINGS:   No prior similar studies are available for review\par \par Thoracic and lumbar vertebral body heights are maintained. No vertebral \par fracture is seen. No destructive bone lesion is found.  Alignment is \par preserved.  Facet joints appear intact and aligned.\par \par Thoracic and lumbar intervertebral disc spaces appear intact. \par Degenerative disc disease and spondylosis is noted at T6-T7 through L4-5 \par with loss of disc height and associated degenerative endplate changes. \par Mild disc bulges at L2-3 through L4-5 flatten the ventral thecal sac and \par narrow the BILATERAL neural foramina.\par \par No paraspinal mass is recognized.  Paraspinal soft tissues appear intact.\par \par \par IMPRESSION:  Degenerative disc disease and spondylosis is noted at T6-T7 \par through L4-5 with loss of disc height and associated degenerative \par endplate changes. Mild disc bulges at L2-3 through L4-5 flatten the \par ventral thecal sac and narrow the BILATERAL neural foramina   No \par vertebral fracture is recognized.\par ---\par \par ACC: [id number] EXAM:  CT ABDOMEN AND PELVIS IC                      \par ACC: [id number] EXAM:  CT CHEST IC                      \par \par PROCEDURE DATE:  12/02/2022  \par \par \par \par INTERPRETATION:  CLINICAL INFORMATION: Trauma\par \par COMPARISON: CT chest abdomen and pelvis 11/5/2022\par \par CONTRAST/COMPLICATIONS:\par IV Contrast: Omnipaque 350  90 cc administered   10 cc discarded\par Oral Contrast: NONE\par Complications: None reported at time of study completion\par \par PROCEDURE:\par CT of the Chest, Abdomen and Pelvis was performed.\par Imaging was performed through the chest in the arterial phase followed by \par imaging of the abdomen and pelvis in the portal venous phase.\par Sagittal and coronal reformats were performed.\par \par FINDINGS:\par CHEST:\par LUNGS AND LARGE AIRWAYS: Endotracheal tube with tip above the chris. \par Bibasilar subsegmental atelectasis.\par PLEURA: No pleural effusion. No pneumothorax.\par VESSELS: Within normal limits.\par HEART: Heart size is normal. No pericardial effusion.\par MEDIASTINUM AND LUCIANA: No lymphadenopathy.\par CHEST WALL AND LOWER NECK: Left chest wall loop recorder.\par \par No acute traumatic visceral injuries are seen.\par \par ABDOMEN AND PELVIS:\par LIVER: Within normal limits.\par BILE DUCTS: Normal caliber.\par GALLBLADDER: Within normal limits.\par SPLEEN: Within normal limits.\par PANCREAS: Within normal limits.\par ADRENALS: Within normal limits.\par KIDNEYS/URETERS: Within normal limits.\par \par BLADDER: Within normal limits.\par REPRODUCTIVE ORGANS: Mildly enlarged prostate.\par \par Moderate gastric distention. No gastric mass. No joseph gastric outlet \par obstruction.\par \par BOWEL: Mildly dilated loops of small bowel with possible transition point \par in the mid abdomen (604:53). Partial or incomplete obstruction could have \par this appearance. No high-grade bowel obstruction.\par No pneumatosis or portal venous gas to suggest intestinal ischemia.\par \par  Appendix is not visualized. No evidence of inflammation in the pericecal \par region.\par PERITONEUM: No ascites.\par VESSELS: Within normal limits.\par RETROPERITONEUM/LYMPH NODES: No lymphadenopathy.\par ABDOMINAL WALL: Small portion of bowel within umbilical hernia. Difficult \par to confirm if colon or small bowel and not clearly source of obstruction.\par BONES: Degenerative changes. Small bone island outside of symphysis pubis.\par \par IMPRESSION:\par No acute traumatic pathology within the chest, abdomen or pelvis.\par \par Moderate gastric distention. No gastric mass. No joseph gastric outlet \par obstruction.\par \par Mildly dilated loops of small bowel with possible transition point in the \par mid abdomen (604:53). Partial or incomplete obstruction could have this \par appearance. No high-grade bowel obstruction. Further evaluation and \par follow-up as clinically indicated.\par \par \par No pneumatosis or portal venous gas to suggest intestinal ischemia.\par -----\par \par \par ACC: [id number] EXAM:  CT CHEST IC                      \par ACC: [id number] EXAM:  CT ABDOMEN AND PELVIS IC                      \par \par PROCEDURE DATE:  01/01/2023  \par \par \par \par INTERPRETATION:  CLINICAL INFORMATION: MRI demonstrating leptomeningeal \par enhancement. Evaluate for systemic disease.\par \par COMPARISON: CT chest abdomen pelvis 12/2/2022\par \par CONTRAST/COMPLICATIONS:\par IV Contrast: Omnipaque 350  90 cc administered   10 cc discarded\par Oral Contrast: NONE\par Complications: None reported at time of study completion\par \par PROCEDURE:\par CT of the Chest, Abdomen and Pelvis was performed.\par Sagittal and coronal reformats were performed.\par \par FINDINGS:\par CHEST:\par LUNGS AND LARGE AIRWAYS: Patent central airways. 3 mm right upper lobe \par nodule (2:93) and 4 mm left lower lobe subpleural nodule (2:75). \par Triangular-shaped left fissural nodule, likely lymph node. Mild right \par lower lobe tree-in-bud opacities, likely distal mucoid impacted airways. \par Bilateral lower lobe dependent atelectasis.\par PLEURA: No pleural effusion.\par VESSELS: Within normal limits.\par HEART: Heart size is normal. No pericardial effusion.\par MEDIASTINUM AND LUCIANA: No lymphadenopathy.\par CHEST WALL AND LOWER NECK: Left chest wall loop recorder.\par \par ABDOMEN AND PELVIS:\par LIVER: Within normal limits.\par BILE DUCTS: Normal caliber.\par GALLBLADDER: Within normal limits.\par SPLEEN: Within normal limits.\par PANCREAS: Within normal limits.\par ADRENALS: Within normal limits.\par KIDNEYS/URETERS: Within normal limits.\par \par BLADDER: Within normal limits.\par REPRODUCTIVE ORGANS: Mildly enlarged prostate gland.\par \par BOWEL: No bowel obstruction. Appendix is normal. Question thickening \par versus underdistention of the rectum. Minimal perirectal stranding. \par Recommend direct visualization if not recently performed.\par PERITONEUM: No ascites.\par VESSELS: Within normal limits.\par RETROPERITONEUM/LYMPH NODES: Prominent left external iliac nodes, for \par reference node measuring 1.2 x 0.8 cm (2:270).\par ABDOMINAL WALL: Fat-containing left inguinal hernia. Small fat-containing \par umbilical hernia.\par BONES: Degenerative changes. No aggressive osseous lesion.\par \par IMPRESSION:\par Question thickening versus underdistention of the rectum, direct \par visualization if not recently performed.\par \par Subcentimeter pelvic lymph nodes.\par \par ---------\par ACC: [id number] EXAM:  CT ABDOMEN AND PELVIS   ORDERED BY: SG MCWILLIAMS \par \par PROCEDURE DATE:  01/19/2023  \par \par \par \par INTERPRETATION:  CLINICAL INFORMATION: Anemia. Evaluate for \par retroperitoneal hematoma. Status post left leg compartment fasciotomy for \par compartment syndrome on 1/18/2023.\par \par COMPARISON: CT chest, abdomen, and pelvis 1/1/2023.\par \par CONTRAST/COMPLICATIONS:\par IV Contrast: None\par Oral Contrast: None\par Complications: None immediate\par \par PROCEDURE:\par CT of the Abdomen and Pelvis was performed.\par Sagittal and coronal reformats were performed.\par \par FINDINGS:\par Limited evaluation of the vasculature and viscera in the absence of \par intravenous contrast.\par \par LOWER CHEST: Hypoattenuation of the blood pool relative to myocardium, \par compatible with anemia.\par \par LIVER: Hepatomegaly, measuring approximately 20 cm craniocaudally in the \par right lobe. Low attenuation of the liver, compatible with steatosis.\par BILE DUCTS: Normal caliber.\par GALLBLADDER: Within normal limits.\par SPLEEN: Within normal limits.\par PANCREAS: Within normal limits.\par ADRENALS: Within normal limits.\par KIDNEYS/URETERS: Within normal limits.\par \par BLADDER: Within normal limits.\par REPRODUCTIVE ORGANS: The prostate is mildly enlarged.\par \par BOWEL: No bowel obstruction. Appendix is normal. No obvious abnormality \par of the rectum, as questioned on the prior CT exam, noting limited \par evaluation due to underdistention and lack of intravenous contrast.\par PERITONEUM: No ascites.\par VESSELS: Atherosclerotic changes.\par RETROPERITONEUM/LYMPH NODES: Unchanged mildly prominent left external \par iliac lymph nodes, nonspecific, with reference lymph node measuring 1.2 x \par 0.8 cm (series 2 image 125). No retroperitoneal hematoma.\par ABDOMINAL WALL: Small fat-containing left inguinal hernia. Small \par wid[place] umbilical hernia containing fat and nonobstructed small bowel. \par Incompletely imaged asymmetric subcutaneous soft tissue \par infiltration/edema within the visualized proximal lateral left thigh.\par BONES: Degenerative changes.\par \par IMPRESSION:\par No retroperitoneal hematoma.\par \par Subcutaneous soft tissue infiltration/edema within the visualized \par proximal lateral left thigh.\par \par ------\par \par ACC: [id number] EXAM:  MR SPINE CERVICAL WAW IC   ORDERED BY: GABBIE HUTCHINS \par \par ACC: [id number] EXAM:  MR BRAIN WAW IC   ORDERED BY: GABBIE HUTCHINS \par \par PROCEDURE DATE:  01/24/2023  \par \par \par \par INTERPRETATION:  CLINICAL INFORMATION: Altered mental status.\par \par TECHNIQUE: Multisequence, multiplanar MRI of the brain and cervical spine \par was obtained before and after the administration of intravenous contrast.\par \par IV Contrast Administered: 8 cc Gadavist.\par IV Contrast Discarded: 2 cc\par \par COMPARISON: MRI brain 12/29/2022\par \par FINDINGS:\par \par MRI BRAIN:\par \par There is no intraparenchymal hematoma, mass effect or midline shift. \par There are a few foci of increased signal on T2/FLAIR involving the right \par frontal subcortical region and right centrum semiovale, representing the \par sequela of known prior infarcts. No new areas of infarct are identified.\par \par Areas of contrast enhancement in the bilateral lentiform nucleus, thalami \par and alexis appear less conspicuous/improved compared with prior study. Mild \par associated increased T2 signal is unchanged to mildly improved.\par \par No abnormal extra-axial fluid collections are present. There is no \par diffusion abnormality to suggest acute or subacute infarction. Major \par flow-voids at the base of the brain follow expected course and contour. \par No abnormal enhancement on post-contrast images.\par \par Proportional prominence of the sulci and ventricles, within normal limits \par for the patient's age.\par \par The calvarium is intact. The visualized intraorbital compartments are \par intact. The paranasal sinuses are clear. The tympanomastoid cavities \par appear free of acute disease.\par \par MRI CERVICAL SPINE:\par \par There is normal cervical lordosis. The vertebral bodies are of normal \par height and configuration. The intervertebral discs spaces are within \par normal limits. Right uncinate hypertrophy/right paracentral disc \par osteophyte complex involving the C3-C4 level and a disc osteophyte \par complex involving the C6-C7 level results in mild central narrowing. \par Bilateral uncovertebral degenerative changes at C5-6 results in mild \par bilateral neural foramina narrowing. The marrow signal is within normal \par limits.\par No prevertebral or paraspinal edema.\par \par There is no cord compression or cord impingement. There is no epidural \par collection.\par \par There is faint enhancement involving the visualized portions of the alexis \par and medulla and superior most cervical cord. Evaluation of the remainder \par of the spinal cord demonstrates no evidence of abnormal signal changes or \par abnormal enhancement.\par \par IMPRESSION:\par \par Areas of contrast enhancement in the bilateral lentiform nucleus, thalami \par and alexis appear less conspicuous / improved compared with prior study. \par There is minimal involvement of the upper cervical cord. Differential \par includes meningothelial hyperplasia, neurosarcoidosis, lymphoma, \par vasculitis and infection. Lower suspicion for CLIPPERS (chronic \par lymphocytic inflammation with pontine perivascular enhancement responsive \par to steroids) given the distribution.\par \par Chronic infarcts involving the right frontal subcortical region and \par right-sided semiovale. No new areas of infarct are identified.\par -------\par \par ACC: [id number] EXAM:  MR LWR EXT JOINT LT                      \par \par PROCEDURE DATE:  01/03/2023  \par \par \par \par INTERPRETATION:  LEFT LOWER EXTREMITY MRI\par \par CLINICAL INDICATION: Left foot drop and neuropathic pain.\par \par COMPARISON: Radiograph dated 12/22/2022. No prior MRI available.\par \par TECHNIQUE: Multiplanar, multisequence MRI was obtained of the left ankle.\par \par FINDINGS:\par \par LIGAMENTS: Talofibular and calcaneofibular ligaments are intact. Chronic \par grade 2 sprains of the inferior tibiofibular ligaments, but without \par syndesmotic widening. Small chronic grade 2 sprain of the deltoid \par ligamentous complex. Calcaneonavicular spring ligament is intact.\par \par DEEP POSTERIOR COMPARTMENT: Mild to moderate posterior tibialis \par tendinosis with chronic grade 2 strain of the superficial/medial slip at \par the level of the naviculocuneiform joint, but without recent tear. Flexor \par tendons are intact.\par \par ACHILLES TENDON: Mild Achilles tendinosis without tear. Trace reactive \par fluid within the retrocalcaneal bursa without joseph bursitis.\par \par LATERAL COMPARTMENT: Peroneal tendons are intact. Accessory peroneal \par quartus noted.\par \par ANTERIOR COMPARTMENT: Tibialis anterior and extensor tendons are intact.\par \par OSSEOUS: No acute fracture, osteonecrosis, or aggressive neoplasm. Mild \par tibiotalar arthrosis without osteochondral lesion of the talar dome. Mild \par hindfoot arthrosis. Plantar spur and chronic healed minimally displaced \par cortical avulsion stress fracture at the calcaneal origin of the plantar \par fascia, but without acute osseous stress reaction. Small type I accessory \par navicular.\par \par SYNOVIUM: Nonspecific trace tibiotalar and anterior subtalar effusions.\par \par GENERAL: Normal size and signal intensity of the deep peroneal and tibial \par nerves where well visualized. Chronic thickening of the central band of \par the plantar fascia with increased intrasubstance signal, nonacute low to \par moderate grade partial-thickness tear along its deep margin measuring 1.3 \par cm in transverse dimension, and mild perifascial edema at the calcaneal \par origin of the central band. No significant loss of normal fat signal \par within the sinus tarsus. Normal appearance of the neurovascular bundle \par within the tarsal tunnel. Patchy acute intramuscular edema like signal \par most prominent within the flexor digitorum brevis and pronator quadratus. \par Heterogeneous muscular atrophy, most prominently involving the abductor \par hallucis, and abductor digiti minimi, and extensor digitorum brevis\par \par IMPRESSION:\par \par 1.  Normal appearance of left deep peroneal and tibial nerves at the \par level of the ankle.\par 2.  Acute on chronic denervation edema of left intrinsic hindfoot \par musculature.\par 3.  Acute on chronic left plantar fasciitis with nonacute grade 2 \par sprain/1.3 cm low to moderate grade partial-thickness tear at the \par calcaneal origin of the central band.\par 4.  Additional lower grade and chronic findings as detailed in the body \par section of this report.\par \par --------\par 12-:   DUPLEX: Acute, occlusive deep vein thromboses noted within the\par left posterior tibial and peroneal veins.\par No evidence of deep vein thrombosis in the right lower\par extremity.\par ----\par

## 2023-04-25 NOTE — HISTORY OF PRESENT ILLNESS
[FreeTextEntry1] : TAMI DUNHAM is a 52 year  old male, seen on today  for hospital follow up. \par He reports today that he is \par currently living in assisted living due to : \par ->  memory issues and word finding \par -> difficulty walking with the left ankle foot drop and numbness. \par \par c/o left ankle pain \par numbness in the left ankle with foot drop but overall foot drop is improving \par fasciotomy left leg in 1-. \par \par \par Seeing hematology ([facility]) for LAD in the left groin later this week \par Pending repeat MRI of brain to look at enhancement seen on imaging in 1-2023\par \par Complex medical history consisting of mulitple TIA/Strokes.  TIAs (2011, 2013), CVAs x3 (2/2022, 8/3/2022, 11/5/2022) w/residual expressive aphasia on Eliquis, dyslipidemia and questionable ASD/PFO admitted to [facility] in  after patient was found unresponsive at home.  At the ER, patient was found covered with dried blood in his mouth, and multiple ecchymosis in his arms, face, chest. His VS as remarkable for fever 105.4, tachycardia 130s, /110 mmHg, and tachypnea of 30. Patient was intubated for airway protection and started on empiric antibiotic. The CT head showed old calcification in mid alexis seen on prior studies concerning for calcified cavernoma. CT thoracic and lumbar spine showing degenerative disc disease T6-T7 through L4-L5 and mild disc bulges at L2-L3 through L4-L5 that flatten the ventral thecal sac and narrowing of the bilateral neural foramina\par EEG was performed and show no seizure and only moderate to severe nonspecific diffuse or multifocal cerebral dysfunction. TTE was performed and no PFO was noticed and EF 57%. His CPK 8720 and patient was treated as rhabdomyolysis On 12/5/22, patient had a RR w/tongue laceration w/excess blood and posturing like movement. Patient received ativan dose and started on Keppra MIMI was performed and showed small PFO w/possible risj of paradoxical embolism and loops recorded implantation was placed.  Repeat EEG on 12/7/22 showed electrographic seizures captured but R LPDs, rare L frontal discharges, severe GRDA. Patient remained with fever and LP was considered. On 12/8/22 patient was extubated and started to following commands. ON 12/12 patient had arthrocentesis of left knee by ortho on 12/12/22 which technically showed blood.  LP was performed on 12/5 showed glucose and protein normal, no infection and negative for paraneoplastic encephalitis. Per friend patient was ashwagandha. 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  On 12/30/22 rheumatology was consulted to r/o sarcoidosis\par During our assessment, patient was A&OX4. No sign of fever. Patient did not recall what he did a day before he passed out. Patient denied any disease in his childhood. Patient denied illicit drug use and recent travel. Patient endorsed L foot w/numbness and tingling which was recently noticed. On rheumatology evaluation she was found to have left foot drop with numbness and tingling.  Patient denied fevers, chills, night sweats, eye pain, eye redness, vision changes, photosensitivity, abdominal pain, n/v/d/c, changes in urinary freq, dysuria, hematuria, foamy urine.  During the December 2022/January 2023 admission patient was found to have bilateral lentiform nucleus and pontine enhancement and LAD in abdomen as described below. After that admission the patient was discharged to rehab.  At rehab he was found to have altered mental status and as admitted back to the hospital in 2-2023.  That hospitalization was significant for LLE compartment syndrome. \par  \par \par \par  [Currently Experiencing] : currently [Anorexia] : no anorexia [Fever] : no fever [Chills] : no chills [Fatigue] : fatigue [Skin Lesions] : no lesions [Skin Nodules] : no skin nodules [Cough] : no cough [Shortness of Breath] : no shortness of breath [Chest Pain] : no chest pain [Arthralgias] : no arthralgias [Joint Swelling] : no joint swelling [Joint Warmth] : no joint warmth [Difficulty Standing] : difficulty standing [Difficulty Walking] : difficulty walking [Myalgias] : no myalgias [Muscle Weakness] : muscle weakness

## 2023-04-26 ENCOUNTER — APPOINTMENT (OUTPATIENT)
Dept: MRI IMAGING | Facility: IMAGING CENTER | Age: 53
End: 2023-04-26
Payer: COMMERCIAL

## 2023-04-26 ENCOUNTER — OUTPATIENT (OUTPATIENT)
Dept: OUTPATIENT SERVICES | Facility: HOSPITAL | Age: 53
LOS: 1 days | End: 2023-04-26
Payer: MEDICAID

## 2023-04-26 DIAGNOSIS — Z00.8 ENCOUNTER FOR OTHER GENERAL EXAMINATION: ICD-10-CM

## 2023-04-26 PROCEDURE — 70544 MR ANGIOGRAPHY HEAD W/O DYE: CPT | Mod: 26,59

## 2023-04-26 PROCEDURE — 70553 MRI BRAIN STEM W/O & W/DYE: CPT

## 2023-04-26 PROCEDURE — 70547 MR ANGIOGRAPHY NECK W/O DYE: CPT | Mod: 26

## 2023-04-26 PROCEDURE — 70553 MRI BRAIN STEM W/O & W/DYE: CPT | Mod: 26

## 2023-04-26 PROCEDURE — 70544 MR ANGIOGRAPHY HEAD W/O DYE: CPT

## 2023-04-26 PROCEDURE — A9585: CPT

## 2023-04-26 PROCEDURE — 70547 MR ANGIOGRAPHY NECK W/O DYE: CPT

## 2023-04-27 DIAGNOSIS — R76.8 OTHER SPECIFIED ABNORMAL IMMUNOLOGICAL FINDINGS IN SERUM: ICD-10-CM

## 2023-05-03 ENCOUNTER — OUTPATIENT (OUTPATIENT)
Dept: OUTPATIENT SERVICES | Facility: HOSPITAL | Age: 53
LOS: 1 days | End: 2023-05-03
Payer: MEDICAID

## 2023-05-03 ENCOUNTER — APPOINTMENT (OUTPATIENT)
Dept: ULTRASOUND IMAGING | Facility: IMAGING CENTER | Age: 53
End: 2023-05-03
Payer: MEDICAID

## 2023-05-03 DIAGNOSIS — Z00.8 ENCOUNTER FOR OTHER GENERAL EXAMINATION: ICD-10-CM

## 2023-05-03 PROCEDURE — 93970 EXTREMITY STUDY: CPT | Mod: 26

## 2023-05-03 PROCEDURE — 93970 EXTREMITY STUDY: CPT

## 2023-05-04 ENCOUNTER — INPATIENT (INPATIENT)
Facility: HOSPITAL | Age: 53
LOS: 5 days | Discharge: HOME | DRG: 301 | End: 2023-05-10
Attending: INTERNAL MEDICINE | Admitting: INTERNAL MEDICINE
Payer: MEDICAID

## 2023-05-04 ENCOUNTER — NON-APPOINTMENT (OUTPATIENT)
Age: 53
End: 2023-05-04

## 2023-05-04 VITALS
DIASTOLIC BLOOD PRESSURE: 85 MMHG | TEMPERATURE: 98 F | RESPIRATION RATE: 20 BRPM | OXYGEN SATURATION: 99 % | WEIGHT: 220.02 LBS | HEART RATE: 79 BPM | HEIGHT: 75 IN | SYSTOLIC BLOOD PRESSURE: 123 MMHG

## 2023-05-04 DIAGNOSIS — Z98.890 OTHER SPECIFIED POSTPROCEDURAL STATES: Chronic | ICD-10-CM

## 2023-05-04 DIAGNOSIS — I82.409 ACUTE EMBOLISM AND THROMBOSIS OF UNSPECIFIED DEEP VEINS OF UNSPECIFIED LOWER EXTREMITY: ICD-10-CM

## 2023-05-04 LAB
24R-OH-CALCIDIOL SERPL-MCNC: 45 PG/ML
25(OH)D3 SERPL-MCNC: 26.8 NG/ML
ACE BLD-CCNC: 41 U/L
ALBUMIN MFR SERPL ELPH: 55.3 %
ALBUMIN SERPL ELPH-MCNC: 4 G/DL — SIGNIFICANT CHANGE UP (ref 3.3–5)
ALBUMIN SERPL ELPH-MCNC: 4.3 G/DL
ALBUMIN SERPL-MCNC: 4 G/DL
ALBUMIN/GLOB SERPL: 1.2 RATIO
ALP BLD-CCNC: 85 U/L
ALP SERPL-CCNC: 82 U/L — SIGNIFICANT CHANGE UP (ref 40–120)
ALPHA1 GLOB MFR SERPL ELPH: 4.7 %
ALPHA1 GLOB SERPL ELPH-MCNC: 0.3 G/DL
ALPHA2 GLOB MFR SERPL ELPH: 10.5 %
ALPHA2 GLOB SERPL ELPH-MCNC: 0.8 G/DL
ALT FLD-CCNC: 22 U/L — SIGNIFICANT CHANGE UP (ref 10–45)
ALT SERPL-CCNC: 19 U/L
ANA SER IF-ACNC: NEGATIVE
ANCA AB SER-IMP: NEGATIVE
ANION GAP SERPL CALC-SCNC: 14 MMOL/L
ANION GAP SERPL CALC-SCNC: 15 MMOL/L — SIGNIFICANT CHANGE UP (ref 5–17)
APPEARANCE: CLEAR
APTT BLD: 34.8 SEC — SIGNIFICANT CHANGE UP (ref 27.5–35.5)
APTT BLD: 89.6 SEC — HIGH (ref 27.5–35.5)
AST SERPL-CCNC: 15 U/L
AST SERPL-CCNC: 18 U/L — SIGNIFICANT CHANGE UP (ref 10–40)
B-GLOBULIN MFR SERPL ELPH: 13.3 %
B-GLOBULIN SERPL ELPH-MCNC: 1 G/DL
BACTERIA: NEGATIVE /HPF
BASOPHILS # BLD AUTO: 0.07 K/UL — SIGNIFICANT CHANGE UP (ref 0–0.2)
BASOPHILS NFR BLD AUTO: 1 % — SIGNIFICANT CHANGE UP (ref 0–2)
BILIRUB SERPL-MCNC: 0.2 MG/DL
BILIRUB SERPL-MCNC: 0.2 MG/DL — SIGNIFICANT CHANGE UP (ref 0.2–1.2)
BILIRUBIN URINE: NEGATIVE
BLOOD URINE: NEGATIVE
BUN SERPL-MCNC: 19 MG/DL — SIGNIFICANT CHANGE UP (ref 7–23)
BUN SERPL-MCNC: 20 MG/DL
C-ANCA SER-ACNC: NEGATIVE
C3 SERPL-MCNC: 153 MG/DL
C4 SERPL-MCNC: 39 MG/DL
CALCIUM SERPL-MCNC: 8.8 MG/DL — SIGNIFICANT CHANGE UP (ref 8.4–10.5)
CALCIUM SERPL-MCNC: 9.2 MG/DL
CAST: 0 /LPF
CCP AB SER IA-ACNC: <8 UNITS
CENTROMERE IGG SER-ACNC: <0.2 AL
CHLORIDE SERPL-SCNC: 104 MMOL/L
CHLORIDE SERPL-SCNC: 104 MMOL/L — SIGNIFICANT CHANGE UP (ref 96–108)
CK SERPL-CCNC: 156 U/L
CO2 SERPL-SCNC: 22 MMOL/L — SIGNIFICANT CHANGE UP (ref 22–31)
CO2 SERPL-SCNC: 23 MMOL/L
COLOR: YELLOW
CREAT SERPL-MCNC: 0.95 MG/DL — SIGNIFICANT CHANGE UP (ref 0.5–1.3)
CREAT SERPL-MCNC: 1.06 MG/DL
CREAT SPEC-SCNC: 190 MG/DL
CREAT/PROT UR: 0.1 RATIO
CRP SERPL-MCNC: <3 MG/L
CRYOCRIT SER SPUN WESTERGREN: 34 MM
CRYOGLOB SERPL-MCNC: POSITIVE
DEPRECATED KAPPA LC FREE/LAMBDA SER: 1.62 RATIO
DSDNA AB SER-ACNC: <12 IU/ML
EGFR: 84 ML/MIN/1.73M2
EGFR: 96 ML/MIN/1.73M2 — SIGNIFICANT CHANGE UP
ENA RNP AB SER IA-ACNC: <0.2 AL
ENA SCL70 IGG SER IA-ACNC: <0.2 AL
ENA SM AB SER IA-ACNC: <0.2 AL
ENA SS-A AB SER IA-ACNC: <0.2 AL
ENA SS-B AB SER IA-ACNC: <0.2 AL
EOSINOPHIL # BLD AUTO: 0.53 K/UL — HIGH (ref 0–0.5)
EOSINOPHIL NFR BLD AUTO: 7.4 % — HIGH (ref 0–6)
EPITHELIAL CELLS: 0 /HPF
ERYTHROCYTE [SEDIMENTATION RATE] IN BLOOD BY WESTERGREN METHOD: 11 MM/HR
ESTIMATED AVERAGE GLUCOSE: 108 MG/DL
FERRITIN SERPL-MCNC: 112 NG/ML
GAMMA GLOB FLD ELPH-MCNC: 1.2 G/DL
GAMMA GLOB MFR SERPL ELPH: 16.2 %
GGT SERPL-CCNC: 23 U/L
GLUCOSE QUALITATIVE U: NEGATIVE MG/DL
GLUCOSE SERPL-MCNC: 85 MG/DL — SIGNIFICANT CHANGE UP (ref 70–99)
GLUCOSE SERPL-MCNC: 93 MG/DL
HBA1C MFR BLD HPLC: 5.4 %
HCT VFR BLD CALC: 38.9 % — LOW (ref 39–50)
HCT VFR BLD CALC: 41.7 %
HGB BLD-MCNC: 12.8 G/DL — LOW (ref 13–17)
HGB BLD-MCNC: 13.5 G/DL
IGA SER QL IEP: 213 MG/DL
IGG SER QL IEP: 1307 MG/DL
IGM SER QL IEP: 50 MG/DL
IMM GRANULOCYTES NFR BLD AUTO: 1.1 % — HIGH (ref 0–0.9)
INR BLD: 0.98 RATIO — SIGNIFICANT CHANGE UP (ref 0.88–1.16)
INR BLD: 1.04 RATIO — SIGNIFICANT CHANGE UP (ref 0.88–1.16)
INTERPRETATION SERPL IEP-IMP: NORMAL
KAPPA LC CSF-MCNC: 1.56 MG/DL
KAPPA LC SERPL-MCNC: 2.52 MG/DL
KETONES URINE: NEGATIVE MG/DL
LEUKOCYTE ESTERASE URINE: NEGATIVE
LYMPHOCYTES # BLD AUTO: 1.2 K/UL — SIGNIFICANT CHANGE UP (ref 1–3.3)
LYMPHOCYTES # BLD AUTO: 16.8 % — SIGNIFICANT CHANGE UP (ref 13–44)
M PROTEIN SPEC IFE-MCNC: NORMAL
MCHC RBC-ENTMCNC: 29.5 PG — SIGNIFICANT CHANGE UP (ref 27–34)
MCHC RBC-ENTMCNC: 29.7 PG
MCHC RBC-ENTMCNC: 32.4 GM/DL
MCHC RBC-ENTMCNC: 32.9 GM/DL — SIGNIFICANT CHANGE UP (ref 32–36)
MCV RBC AUTO: 89.6 FL — SIGNIFICANT CHANGE UP (ref 80–100)
MCV RBC AUTO: 91.9 FL
MICROSCOPIC-UA: NORMAL
MONOCYTES # BLD AUTO: 1.1 K/UL — HIGH (ref 0–0.9)
MONOCYTES NFR BLD AUTO: 15.4 % — HIGH (ref 2–14)
MYELOPEROXIDASE AB SER QL IA: <5 UNITS
MYELOPEROXIDASE CELLS FLD QL: NEGATIVE
NEUTROPHILS # BLD AUTO: 4.18 K/UL — SIGNIFICANT CHANGE UP (ref 1.8–7.4)
NEUTROPHILS NFR BLD AUTO: 58.3 % — SIGNIFICANT CHANGE UP (ref 43–77)
NITRITE URINE: NEGATIVE
NRBC # BLD: 0 /100 WBCS — SIGNIFICANT CHANGE UP (ref 0–0)
P-ANCA TITR SER IF: NEGATIVE
PH URINE: 5.5
PLATELET # BLD AUTO: 194 K/UL — SIGNIFICANT CHANGE UP (ref 150–400)
PLATELET # BLD AUTO: 242 K/UL
POTASSIUM SERPL-MCNC: 4.2 MMOL/L — SIGNIFICANT CHANGE UP (ref 3.5–5.3)
POTASSIUM SERPL-SCNC: 4.2 MMOL/L — SIGNIFICANT CHANGE UP (ref 3.5–5.3)
POTASSIUM SERPL-SCNC: 4.6 MMOL/L
PROT SERPL-MCNC: 7.1 G/DL — SIGNIFICANT CHANGE UP (ref 6–8.3)
PROT SERPL-MCNC: 7.2 G/DL
PROT UR-MCNC: 11 MG/DL
PROTEIN URINE: NEGATIVE MG/DL
PROTEINASE3 AB SER IA-ACNC: <5 UNITS
PROTEINASE3 AB SER-ACNC: NEGATIVE
PROTHROM AB SERPL-ACNC: 11.3 SEC — SIGNIFICANT CHANGE UP (ref 10.5–13.4)
PROTHROM AB SERPL-ACNC: 12 SEC — SIGNIFICANT CHANGE UP (ref 10.5–13.4)
RBC # BLD: 4.34 M/UL — SIGNIFICANT CHANGE UP (ref 4.2–5.8)
RBC # BLD: 4.54 M/UL
RBC # FLD: 13.6 %
RBC # FLD: 13.7 % — SIGNIFICANT CHANGE UP (ref 10.3–14.5)
RED BLOOD CELLS URINE: 2 /HPF
RF+CCP IGG SER-IMP: NEGATIVE
RHEUMATOID FACT SER QL: <10 IU/ML
RIBOSOMAL P AB SER IA-ACNC: <0.2 AL
RNA POLYMERASE III IGG: 8 UNITS
SODIUM SERPL-SCNC: 140 MMOL/L
SODIUM SERPL-SCNC: 141 MMOL/L — SIGNIFICANT CHANGE UP (ref 135–145)
SPECIFIC GRAVITY URINE: 1.03
THYROGLOB AB SERPL-ACNC: <20 IU/ML
THYROPEROXIDASE AB SERPL IA-ACNC: 41.1 IU/ML
TSH SERPL-ACNC: 1.21 UIU/ML
UROBILINOGEN URINE: 0.2 MG/DL
WBC # BLD: 7.16 K/UL — SIGNIFICANT CHANGE UP (ref 3.8–10.5)
WBC # FLD AUTO: 7.16 K/UL — SIGNIFICANT CHANGE UP (ref 3.8–10.5)
WBC # FLD AUTO: 8.12 K/UL
WHITE BLOOD CELLS URINE: 2 /HPF

## 2023-05-04 PROCEDURE — 99285 EMERGENCY DEPT VISIT HI MDM: CPT

## 2023-05-04 PROCEDURE — 99222 1ST HOSP IP/OBS MODERATE 55: CPT

## 2023-05-04 PROCEDURE — 71045 X-RAY EXAM CHEST 1 VIEW: CPT | Mod: 26

## 2023-05-04 PROCEDURE — 99223 1ST HOSP IP/OBS HIGH 75: CPT

## 2023-05-04 PROCEDURE — 70450 CT HEAD/BRAIN W/O DYE: CPT | Mod: 26

## 2023-05-04 RX ORDER — DEXTROSE 50 % IN WATER 50 %
15 SYRINGE (ML) INTRAVENOUS ONCE
Refills: 0 | Status: DISCONTINUED | OUTPATIENT
Start: 2023-05-04 | End: 2023-05-10

## 2023-05-04 RX ORDER — HEPARIN SODIUM 5000 [USP'U]/ML
4000 INJECTION INTRAVENOUS; SUBCUTANEOUS EVERY 6 HOURS
Refills: 0 | Status: DISCONTINUED | OUTPATIENT
Start: 2023-05-04 | End: 2023-05-06

## 2023-05-04 RX ORDER — SODIUM CHLORIDE 9 MG/ML
1000 INJECTION, SOLUTION INTRAVENOUS
Refills: 0 | Status: DISCONTINUED | OUTPATIENT
Start: 2023-05-04 | End: 2023-05-09

## 2023-05-04 RX ORDER — DEXTROSE 50 % IN WATER 50 %
25 SYRINGE (ML) INTRAVENOUS ONCE
Refills: 0 | Status: DISCONTINUED | OUTPATIENT
Start: 2023-05-04 | End: 2023-05-10

## 2023-05-04 RX ORDER — HEPARIN SODIUM 5000 [USP'U]/ML
8000 INJECTION INTRAVENOUS; SUBCUTANEOUS EVERY 6 HOURS
Refills: 0 | Status: DISCONTINUED | OUTPATIENT
Start: 2023-05-04 | End: 2023-05-04

## 2023-05-04 RX ORDER — GABAPENTIN 400 MG/1
300 CAPSULE ORAL ONCE
Refills: 0 | Status: COMPLETED | OUTPATIENT
Start: 2023-05-04 | End: 2023-05-04

## 2023-05-04 RX ORDER — HEPARIN SODIUM 5000 [USP'U]/ML
INJECTION INTRAVENOUS; SUBCUTANEOUS
Qty: 25000 | Refills: 0 | Status: DISCONTINUED | OUTPATIENT
Start: 2023-05-04 | End: 2023-05-04

## 2023-05-04 RX ORDER — ASPIRIN/CALCIUM CARB/MAGNESIUM 324 MG
81 TABLET ORAL DAILY
Refills: 0 | Status: DISCONTINUED | OUTPATIENT
Start: 2023-05-05 | End: 2023-05-10

## 2023-05-04 RX ORDER — PANTOPRAZOLE SODIUM 20 MG/1
40 TABLET, DELAYED RELEASE ORAL
Refills: 0 | Status: DISCONTINUED | OUTPATIENT
Start: 2023-05-05 | End: 2023-05-10

## 2023-05-04 RX ORDER — GLUCAGON INJECTION, SOLUTION 0.5 MG/.1ML
1 INJECTION, SOLUTION SUBCUTANEOUS ONCE
Refills: 0 | Status: DISCONTINUED | OUTPATIENT
Start: 2023-05-04 | End: 2023-05-10

## 2023-05-04 RX ORDER — OXYCODONE HYDROCHLORIDE 5 MG/1
5 TABLET ORAL EVERY 6 HOURS
Refills: 0 | Status: DISCONTINUED | OUTPATIENT
Start: 2023-05-04 | End: 2023-05-10

## 2023-05-04 RX ORDER — HEPARIN SODIUM 5000 [USP'U]/ML
INJECTION INTRAVENOUS; SUBCUTANEOUS
Qty: 25000 | Refills: 0 | Status: DISCONTINUED | OUTPATIENT
Start: 2023-05-04 | End: 2023-05-05

## 2023-05-04 RX ORDER — ATORVASTATIN CALCIUM 80 MG/1
40 TABLET, FILM COATED ORAL AT BEDTIME
Refills: 0 | Status: DISCONTINUED | OUTPATIENT
Start: 2023-05-04 | End: 2023-05-10

## 2023-05-04 RX ORDER — HEPARIN SODIUM 5000 [USP'U]/ML
8500 INJECTION INTRAVENOUS; SUBCUTANEOUS EVERY 6 HOURS
Refills: 0 | Status: DISCONTINUED | OUTPATIENT
Start: 2023-05-04 | End: 2023-05-06

## 2023-05-04 RX ORDER — DEXTROSE 50 % IN WATER 50 %
12.5 SYRINGE (ML) INTRAVENOUS ONCE
Refills: 0 | Status: DISCONTINUED | OUTPATIENT
Start: 2023-05-04 | End: 2023-05-10

## 2023-05-04 RX ORDER — ACETAMINOPHEN 500 MG
650 TABLET ORAL EVERY 6 HOURS
Refills: 0 | Status: DISCONTINUED | OUTPATIENT
Start: 2023-05-04 | End: 2023-05-10

## 2023-05-04 RX ORDER — LEVETIRACETAM 250 MG/1
1500 TABLET, FILM COATED ORAL
Refills: 0 | Status: DISCONTINUED | OUTPATIENT
Start: 2023-05-04 | End: 2023-05-10

## 2023-05-04 RX ORDER — SODIUM CHLORIDE 9 MG/ML
1000 INJECTION, SOLUTION INTRAVENOUS
Refills: 0 | Status: DISCONTINUED | OUTPATIENT
Start: 2023-05-04 | End: 2023-05-10

## 2023-05-04 RX ORDER — GABAPENTIN 400 MG/1
300 CAPSULE ORAL THREE TIMES A DAY
Refills: 0 | Status: DISCONTINUED | OUTPATIENT
Start: 2023-05-04 | End: 2023-05-10

## 2023-05-04 RX ORDER — LACOSAMIDE 50 MG/1
100 TABLET ORAL
Refills: 0 | Status: DISCONTINUED | OUTPATIENT
Start: 2023-05-04 | End: 2023-05-10

## 2023-05-04 RX ORDER — HEPARIN SODIUM 5000 [USP'U]/ML
4000 INJECTION INTRAVENOUS; SUBCUTANEOUS EVERY 6 HOURS
Refills: 0 | Status: DISCONTINUED | OUTPATIENT
Start: 2023-05-04 | End: 2023-05-04

## 2023-05-04 RX ADMIN — ATORVASTATIN CALCIUM 40 MILLIGRAM(S): 80 TABLET, FILM COATED ORAL at 21:20

## 2023-05-04 RX ADMIN — HEPARIN SODIUM 1800 UNIT(S)/HR: 5000 INJECTION INTRAVENOUS; SUBCUTANEOUS at 19:21

## 2023-05-04 RX ADMIN — HEPARIN SODIUM 1800 UNIT(S)/HR: 5000 INJECTION INTRAVENOUS; SUBCUTANEOUS at 20:44

## 2023-05-04 RX ADMIN — HEPARIN SODIUM 1800 UNIT(S)/HR: 5000 INJECTION INTRAVENOUS; SUBCUTANEOUS at 12:55

## 2023-05-04 RX ADMIN — GABAPENTIN 300 MILLIGRAM(S): 400 CAPSULE ORAL at 15:10

## 2023-05-04 RX ADMIN — GABAPENTIN 300 MILLIGRAM(S): 400 CAPSULE ORAL at 21:21

## 2023-05-04 RX ADMIN — SODIUM CHLORIDE 60 MILLILITER(S): 9 INJECTION, SOLUTION INTRAVENOUS at 20:51

## 2023-05-04 RX ADMIN — LEVETIRACETAM 1500 MILLIGRAM(S): 250 TABLET, FILM COATED ORAL at 20:50

## 2023-05-04 RX ADMIN — LACOSAMIDE 100 MILLIGRAM(S): 50 TABLET ORAL at 18:42

## 2023-05-04 NOTE — ED ADULT NURSE REASSESSMENT NOTE - NS ED NURSE REASSESS COMMENT FT1
As per MD victor, pt will not receive initial bolus due to home medication of anticoagulation already taken.

## 2023-05-04 NOTE — CONSULT NOTE ADULT - SUBJECTIVE AND OBJECTIVE BOX
***consult has been received. note is in progress and incomplete without attending attestation***    Lukasz Trotter MD, PGY-4  Rheumatology Fellow  Reachable on TEAMS    TAMI DUNHAM  [id number]    HISTORY OF PRESENT ILLNESS:  52 y.o M with mulitple TIA/Strokes. TIAs (2011, 2013), CVAs x3 (2/2022, 8/3/2022, 11/5/2022) w/residual expressive aphasia on Eliquis, dyslipidemia and questionable ASD/PFO who presents to the hospital after being found with a new DVT.    The patient has a complex medical history with multiple prior admissions as well as a protracted work up.  The patient was admitted to [facility] in  after patient was found unresponsive at home. At the ER, patient was found covered with dried blood in his mouth, and multiple ecchymosis in his arms, face, chest. His VS as remarkable for fever 105.4, tachycardia 130s, /110 mmHg, and tachypnea of 30. Patient was intubated for airway protection and started on empiric antibiotics. The CT head showed old calcification in mid alexis seen on prior studies concerning for calcified cavernoma. CT thoracic and lumbar spine showing degenerative disc disease T6-T7 through L4-L5 and mild disc bulges at L2-L3 through L4-L5 that flatten the ventral thecal sac and narrowing of the bilateral neural foramina. Initial EEG was non-diagnostic but repeat showed     EEG was performed and show no seizure and only moderate to severe nonspecific diffuse or multifocal cerebral dysfunction. TTE was performed and no PFO was noticed and EF 57%. His CPK 8720 and patient was treated as rhabdomyolysis On 12/5/22, patient had a RR w/tongue laceration w/excess blood and posturing like movement. Patient received ativan dose and started on Keppra MIMI was performed and showed small PFO w/possible risj of paradoxical embolism and loops recorded implantation was placed. Repeat EEG on 12/7/22 showed electrographic seizures captured but R LPDs, rare L frontal discharges, severe GRDA. Patient remained with fever and LP was considered. On 12/8/22 patient was extubated and started to following commands. ON 12/12 patient had arthrocentesis of left knee by ortho on 12/12/22 which technically showed blood. LP was performed on 12/5 showed glucose and protein normal, no infection and negative for paraneoplastic encephalitis. Per friend patient was ashwagandha. 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      During the December 2022/January 2023 admission patient was found to have bilateral lentiform nucleus and pontine enhancement and LAD in abdomen as described below. After that admission the patient was discharged to rehab. At rehab he was found to have altered mental status and as admitted back to the hospital in 2-2023. That hospitalization was significant for LLE compartment syndrome. The patient had repeat neurology work up with negative LP and MR from 1/24/23 showing chronic R frontal and R semiovale infarcts. PAtient with pelvic LAD not amenable to biopsy per IR during that hospitalization. The patient also had fasciotomy on 1/18/23 for left leg compartment syndrome    Labs:   Positive: cryoglobulins with cryocrit 34    Negative: SHAY/dsDNA/Sm/MPO/PR3/C3/C4/RF/CCP/Sjogrens/LAC/B2GPI/aCL/atypical APLS/scleroderma/centromere/IgG4/ACE    Rheum ROS    Denies fevers/chills/weight loss/night sweats/alopecia/sinus disease/asthma history/oral ulcers/dysphagia/vision changes/Raynauds/VTE/miscarraiges/sicca symptoms/urinary changes/edema/SOB/joint pain/swelling/jaw claudication/vision changes/rash/photosensitivity/morning stiffness    Endorses fevers/chills/weight loss/night sweats/alopecia/sinus disease/asthma history/oral ulcers/dysphagia/vision changes/Raynauds/VTE/miscarraiges/sicca symptoms/urinary changes/edema/SOB/joint pain/swelling/jaw claudication/vision changes/rash/photosensitivity/morning stiffness      MEDICATIONS  (STANDING):  heparin  Infusion.  Unit(s)/Hr (18 mL/Hr) IV Continuous <Continuous>    MEDICATIONS  (PRN):  heparin   Injectable 8500 Unit(s) IV Push every 6 hours PRN For aPTT less than 40  heparin   Injectable 4000 Unit(s) IV Push every 6 hours PRN For aPTT between 40 - 57      Allergies    No Known Allergies    Intolerances      PERTINENT MEDICATION HISTORY:    SOCIAL HISTORY: denied illicit drug use  OCCUPATION: worked as a pianist  TRAVEL HISTORY:    FAMILY HISTORY:  no known disease      Vital Signs Last 24 Hrs  T(C): 36.7 (04 May 2023 11:40), Max: 36.7 (04 May 2023 11:40)  T(F): 98 (04 May 2023 11:40), Max: 98 (04 May 2023 11:40)  HR: 71 (04 May 2023 11:40) (71 - 79)  BP: 120/78 (04 May 2023 11:40) (120/78 - 123/85)  BP(mean): 92 (04 May 2023 11:40) (92 - 92)  RR: 20 (04 May 2023 11:40) (20 - 20)  SpO2: 96% (04 May 2023 11:40) (96% - 99%)    Parameters below as of 04 May 2023 11:40  Patient On (Oxygen Delivery Method): room air        Physical Exam:  General: No apparent distress  HEENT: EOMI, MMM  CVS: +S1/S2, RRR, no murmurs/rubs/gallops  Resp: CTA b/l. No crackles/wheezing  GI: Soft, NT/ND +BS  MSK: no swelling/warmth/erythema of the joints of the UE/LE  Neuro: AAOx3  Skin: no visible rashes    LABS:                        12.8   7.16  )-----------( 194      ( 04 May 2023 12:03 )             38.9     05-04    141  |  104  |  19  ----------------------------<  85  4.2   |  22  |  0.95    Ca    8.8      04 May 2023 12:03    TPro  7.1  /  Alb  4.0  /  TBili  0.2  /  DBili  x   /  AST  18  /  ALT  22  /  AlkPhos  82  05-04    PT/INR - ( 04 May 2023 12:03 )   PT: 12.0 sec;   INR: 1.04 ratio         PTT - ( 04 May 2023 12:03 )  PTT:34.8 sec      Rheumatology Work Up    Anticardiolipin Antibody Level, Total: Negative [Method: EIA] (01-20-23 @ 04:45)  Beta 2 Glycoprotein 1 Antibody Screen: Negative (01-19-23 @ 05:46)  Anticardiolipin Antibody Level, Total: Negative [Method: EIA] (01-19-23 @ 05:46)  Proteinase 3 Antibody Assay: <5.0 Units (12-30-22 @ 20:00)        RADIOLOGY & ADDITIONAL STUDIES:     ***consult has been received. note is in progress and incomplete without attending attestation***    Lukasz Trotter MD, PGY-4  Rheumatology Fellow  Reachable on TEAMS    TAMI DUNHAM  [id number]    HISTORY OF PRESENT ILLNESS:  52 y.o M with mulitple TIA/Strokes. TIAs (2011, 2013), CVAs x3 (2/2022, 8/3/2022, 11/5/2022) w/residual expressive aphasia on Eliquis, dyslipidemia and questionable ASD/PFO who presents to the hospital after being found with a new DVT.    The patient has a complex medical history with multiple prior admissions as well as a protracted work up.  The patient was admitted to [facility] in  after patient was found unresponsive at home. At the ER, patient was found covered with dried blood in his mouth, and multiple ecchymosis in his arms, face, chest. His VS as remarkable for fever 105.4, tachycardia 130s, /110 mmHg, and tachypnea of 30. Patient was intubated for airway protection and started on empiric antibiotics. The CT head showed old calcification in mid alexis seen on prior studies concerning for calcified cavernoma. CT thoracic and lumbar spine showing degenerative disc disease T6-T7 through L4-L5 and mild disc bulges at L2-L3 through L4-L5 that flatten the ventral thecal sac and narrowing of the bilateral neural foramina. Initial EEG was non-diagnostic but repeat showed     EEG was performed and show no seizure and only moderate to severe nonspecific diffuse or multifocal cerebral dysfunction. TTE was performed and no PFO was noticed and EF 57%. His CPK 8720 and patient was treated as rhabdomyolysis On 12/5/22, patient had a RR w/tongue laceration w/excess blood and posturing like movement. Patient received ativan dose and started on Keppra MIMI was performed and showed small PFO w/possible risj of paradoxical embolism and loops recorded implantation was placed. Repeat EEG on 12/7/22 showed electrographic seizures captured but R LPDs, rare L frontal discharges, severe GRDA. Patient remained with fever and LP was considered. On 12/8/22 patient was extubated and started to following commands. ON 12/12 patient had arthrocentesis of left knee by ortho on 12/12/22 which technically showed blood. LP was performed on 12/5 showed glucose and protein normal, no infection and negative for paraneoplastic encephalitis. Per friend patient was ashwagandha. 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      During the December 2022/January 2023 admission patient was found to have bilateral lentiform nucleus and pontine enhancement and LAD in abdomen as described below. After that admission the patient was discharged to rehab. At rehab he was found to have altered mental status and as admitted back to the hospital in 2-2023. That hospitalization was significant for LLE compartment syndrome. The patient had repeat neurology work up with negative LP and MR from 1/24/23 showing chronic R frontal and R semiovale infarcts. PAtient with pelvic LAD not amenable to biopsy per IR during that hospitalization. The patient also had fasciotomy on 1/18/23 for left leg compartment syndrome    Labs:   Positive: cryoglobulins with cryocrit 34    Negative: SHAY/dsDNA/Sm/MPO/PR3/C3/C4/RF/CCP/Sjogrens/LAC/B2GPI/aCL/atypical APLS/scleroderma/centromere/IgG4/ACE    Rheum ROS    Denies fevers/chills/weight loss/night sweats/alopecia/sinus disease/asthma history/oral ulcers/dysphagia/vision changes/Raynauds/VTE/miscarraiges/sicca symptoms/urinary changes/edema/SOB/joint pain/swelling/jaw claudication/vision changes/rash/photosensitivity/morning stiffness    Endorses fevers/chills/weight loss/night sweats/alopecia/sinus disease/asthma history/oral ulcers/dysphagia/vision changes/Raynauds/VTE/miscarraiges/sicca symptoms/urinary changes/edema/SOB/joint pain/swelling/jaw claudication/vision changes/rash/photosensitivity/morning stiffness      MEDICATIONS  (STANDING):  heparin  Infusion.  Unit(s)/Hr (18 mL/Hr) IV Continuous <Continuous>    MEDICATIONS  (PRN):  heparin   Injectable 8500 Unit(s) IV Push every 6 hours PRN For aPTT less than 40  heparin   Injectable 4000 Unit(s) IV Push every 6 hours PRN For aPTT between 40 - 57      Allergies    No Known Allergies    Intolerances      PERTINENT MEDICATION HISTORY:    SOCIAL HISTORY: denied illicit drug use  OCCUPATION: worked as a pianist  TRAVEL HISTORY:    FAMILY HISTORY:  no known disease      Vital Signs Last 24 Hrs  T(C): 36.7 (04 May 2023 11:40), Max: 36.7 (04 May 2023 11:40)  T(F): 98 (04 May 2023 11:40), Max: 98 (04 May 2023 11:40)  HR: 71 (04 May 2023 11:40) (71 - 79)  BP: 120/78 (04 May 2023 11:40) (120/78 - 123/85)  BP(mean): 92 (04 May 2023 11:40) (92 - 92)  RR: 20 (04 May 2023 11:40) (20 - 20)  SpO2: 96% (04 May 2023 11:40) (96% - 99%)    Parameters below as of 04 May 2023 11:40  Patient On (Oxygen Delivery Method): room air        Physical Exam:  General: No apparent distress  HEENT: EOMI, MMM  CVS: +S1/S2, RRR, no murmurs/rubs/gallops  Resp: CTA b/l. No crackles/wheezing  GI: Soft, NT/ND +BS  MSK: no swelling/warmth/erythema of the joints of the UE/LE  Neuro: AAOx3  Skin: no visible rashes    LABS:                        12.8   7.16  )-----------( 194      ( 04 May 2023 12:03 )             38.9     05-04    141  |  104  |  19  ----------------------------<  85  4.2   |  22  |  0.95    Ca    8.8      04 May 2023 12:03    TPro  7.1  /  Alb  4.0  /  TBili  0.2  /  DBili  x   /  AST  18  /  ALT  22  /  AlkPhos  82  05-04    PT/INR - ( 04 May 2023 12:03 )   PT: 12.0 sec;   INR: 1.04 ratio         PTT - ( 04 May 2023 12:03 )  PTT:34.8 sec      Rheumatology Work Up    Anticardiolipin Antibody Level, Total: Negative [Method: EIA] (01-20-23 @ 04:45)  Beta 2 Glycoprotein 1 Antibody Screen: Negative (01-19-23 @ 05:46)  Anticardiolipin Antibody Level, Total: Negative [Method: EIA] (01-19-23 @ 05:46)  Proteinase 3 Antibody Assay: <5.0 Units (12-30-22 @ 20:00)        RADIOLOGY & ADDITIONAL STUDIES:  < from: MR Head w/wo IV Cont (04.26.23 @ 19:59) >    IMPRESSION:    1.  RIGHT CAROTID NECK CIRCULATION:   Intact.    2.  LEFT CAROTID NECK CIRCULATION:    Intact.    3.  VERTEBRAL NECK CIRCULATION:   Intact    4.  ANTERIOR INTRACRANIAL CIRCULATION:     Intact.    5.  POSTERIOR INTRACRANIAL CIRCULATION:   Tortuosity/mild dolichoectasia.    6.  BRAIN:   No evidence of acute infarction. Indistinct pathologic   enhancement involves the basal ganglia and thalami, most evident in the   left putamen where it is associated with faint hyperintensity on FLAIR   images ; this finding is nonspecific but suggests an inflammatory process   ; similar enhancement is noted on previous such as MR 12/9/2022, more   pronounced than on 1/24/2023.  Cerebral hemispheric white matter infarcts   with additional subcortical cerebral hemispheric white matter lesions,   nonspecific, early manifestation of ischemic white matter disease versus   inflammatory. Left thalamic pontine remote petechial hemorrhage ; central   upper pontine lesion corresponds to coarse consultation on CT,   indeterminate for previous hemorrhage    < end of copied text >    < from: CT Abdomen and Pelvis No Cont (01.19.23 @ 16:52) >    ACC: [id number] EXAM:  CT ABDOMEN AND PELVIS   ORDERED BY: SG MCWILLIAMS     PROCEDURE DATE:  01/19/2023          INTERPRETATION:  CLINICAL INFORMATION: Anemia. Evaluate for   retroperitoneal hematoma. Status post left leg compartment fasciotomy for   compartment syndrome on 1/18/2023.    COMPARISON: CT chest, abdomen, and pelvis 1/1/2023.    CONTRAST/COMPLICATIONS:  IV Contrast: None  Oral Contrast: None  Complications: None immediate    PROCEDURE:  CT of the Abdomen and Pelvis was performed.  Sagittal and coronal reformats were performed.    FINDINGS:  Limited evaluation of the vasculature and viscera in the absence of   intravenous contrast.    LOWER CHEST: Hypoattenuation of the blood pool relative to myocardium,   compatible with anemia.    LIVER: Hepatomegaly, measuring approximately 20 cm craniocaudally in the   right lobe. Low attenuation of the liver, compatible with steatosis.  BILE DUCTS: Normal caliber.  GALLBLADDER: Within normal limits.  SPLEEN: Within normal limits.  PANCREAS: Within normal limits.  ADRENALS: Within normal limits.  KIDNEYS/URETERS: Within normal limits.    BLADDER: Within normal limits.  REPRODUCTIVE ORGANS: The prostate is mildly enlarged.    BOWEL: No bowel obstruction. Appendix is normal. No obvious abnormality   of the rectum, as questioned on the prior CT exam, noting limited   evaluation due to underdistention and lack of intravenous contrast.  PERITONEUM: No ascites.  VESSELS: Atherosclerotic changes.  RETROPERITONEUM/LYMPH NODES: Unchanged mildly prominent left external   iliac lymph nodes, nonspecific, with reference lymph node measuring 1.2 x   0.8 cm (series 2 image 125). No retroperitoneal hematoma.  ABDOMINAL WALL: Small fat-containing left inguinal hernia. Small   wid[place]outh umbilical hernia containing fat and nonobstructed small bowel.   Incompletely imaged asymmetric subcutaneous soft tissue   infiltration/edema within the visualized proximal lateral left thigh.  BONES: Degenerative changes.    IMPRESSION:  No retroperitoneal hematoma.    Subcutaneous soft tissue infiltration/edema within the visualized   proximal lateral left thigh.    < end of copied text >     Lukasz Trotter MD, PGY-4  Rheumatology Fellow  Reachable on TEAMS    TAMI BARKLEYS  [id number]    HISTORY OF PRESENT ILLNESS:  52 y.o M with mulitple TIA/Strokes. TIAs (2011, 2013), CVAs x3 (2/2022, 8/3/2022, 11/5/2022) w/residual expressive aphasia on Eliquis, dyslipidemia and questionable ASD/PFO who presents to the hospital after being found with a new DVT.    The patient has a complex medical history with multiple prior admissions as well as a protracted work up.  The patient was admitted to [facility] in  after patient was found unresponsive at home. At the ER, patient was found covered with dried blood in his mouth, and multiple ecchymosis in his arms, face, chest. His VS as remarkable for fever 105.4, tachycardia 130s, /110 mmHg, and tachypnea of 30. Patient was intubated for airway protection and started on empiric antibiotics. 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 Rheumatology work up at that point was negative.      EEG was performed and show no seizure and only moderate to severe nonspecific diffuse or multifocal cerebral dysfunction. TTE was performed and no PFO was noticed and EF 57%. His CPK 8720 and patient was treated as rhabdomyolysis On 12/5/22, patient had a RR w/tongue laceration w/excess blood and posturing like movement. Patient received ativan dose and started on Keppra MIMI was performed and showed small PFO w/possible risj of paradoxical embolism and loops recorded implantation was placed. Repeat EEG on 12/7/22 showed electrographic seizures captured but R LPDs, rare L frontal discharges, severe GRDA. Patient remained with fever and LP was considered. On 12/8/22 patient was extubated and started to following commands. ON 12/12 patient had arthrocentesis of left knee by ortho on 12/12/22 which technically showed blood. LP was performed on 12/5 showed glucose and protein normal, no infection and negative for paraneoplastic encephalitis.     During the December 2022/January 2023 admission patient was found to have bilateral lentiform nucleus and pontine enhancement and LAD in abdomen as described below. After that admission the patient was discharged to rehab. At rehab he was found to have altered mental status and as admitted back to the hospital in 1-2023. That hospitalization was significant for LLE compartment syndrome. The patient had repeat neurology work up with negative LP and MR from 1/24/23 showing chronic R frontal and R semiovale infarcts. Patient with pelvic LAD not amenable to biopsy per IR during that hospitalization. The patient also had fasciotomy on 1/18/23 for left leg compartment syndrome. Patient was evaluated as an outpatient with being found to have a new DVT despite eliquis     Labs:   Positive: cryoglobulins with cryocrit 34    Negative: SHAY/dsDNA/Sm/MPO/PR3/C3/C4/RF/CCP/Sjogrens/LAC/B2GPI/aCL/atypical APLS/scleroderma/centromere/IgG4/ACE    Rheum ROS    Denies fevers/chills/weight loss/night sweats/alopecia/sinus disease/asthma history/oral ulcers/vision changes/Raynauds/sicca symptoms/urinary changes/edema/SOB/    Endorses 20 lb weight gain after losing 40 lbs from being in the hospital. Endorses globus sensation x few days.    MEDICATIONS  (STANDING):  atorvastatin 40 milliGRAM(s) Oral at bedtime  dextrose 5%. 1000 milliLiter(s) (100 mL/Hr) IV Continuous <Continuous>  dextrose 5%. 1000 milliLiter(s) (50 mL/Hr) IV Continuous <Continuous>  dextrose 50% Injectable 25 Gram(s) IV Push once  dextrose 50% Injectable 12.5 Gram(s) IV Push once  dextrose 50% Injectable 25 Gram(s) IV Push once  gabapentin 300 milliGRAM(s) Oral three times a day  glucagon  Injectable 1 milliGRAM(s) IntraMuscular once  heparin  Infusion.  Unit(s)/Hr (18 mL/Hr) IV Continuous <Continuous>  lacosamide 100 milliGRAM(s) Oral two times a day  levETIRAcetam 1500 milliGRAM(s) Oral <User Schedule>  multivitamin 1 Tablet(s) Oral daily    MEDICATIONS  (PRN):  acetaminophen     Tablet .. 650 milliGRAM(s) Oral every 6 hours PRN Temp greater or equal to 38C (100.4F), Mild Pain (1 - 3)  dextrose Oral Gel 15 Gram(s) Oral once PRN Blood Glucose LESS THAN 70 milliGRAM(s)/deciliter  heparin   Injectable 8500 Unit(s) IV Push every 6 hours PRN For aPTT less than 40  heparin   Injectable 4000 Unit(s) IV Push every 6 hours PRN For aPTT between 40 - 57  oxyCODONE    IR 5 milliGRAM(s) Oral every 6 hours PRN Moderate Pain (4 - 6)      PERTINENT MEDICATION HISTORY:    SOCIAL HISTORY: denied illicit drug use  OCCUPATION: worked as a pianist  TRAVEL HISTORY:    FAMILY HISTORY:  no known disease      Vital Signs Last 24 Hrs  T(C): 36.7 (04 May 2023 11:40), Max: 36.7 (04 May 2023 11:40)  T(F): 98 (04 May 2023 11:40), Max: 98 (04 May 2023 11:40)  HR: 71 (04 May 2023 11:40) (71 - 79)  BP: 120/78 (04 May 2023 11:40) (120/78 - 123/85)  BP(mean): 92 (04 May 2023 11:40) (92 - 92)  RR: 20 (04 May 2023 11:40) (20 - 20)  SpO2: 96% (04 May 2023 11:40) (96% - 99%)    Parameters below as of 04 May 2023 11:40  Patient On (Oxygen Delivery Method): room air        Physical Exam:  General: No apparent distress  HEENT: EOMI, MMM  CVS: +S1/S2, RRR, no murmurs/rubs/gallops  Resp: CTA b/l. No crackles/wheezing  GI: Soft, NT/ND +BS  MSK: no swelling/warmth/erythema of the joints of the UE/LE, LLE s/p fasciotomy. no wrist drop or foot drop in right foot. left foot with decreased strength  Neuro: AAOx3  Skin: LLE s/p fasciotomy with still healing fasciotomy wounds with serous discharge on dressing. no purulence    LABS:                        12.8   7.16  )-----------( 194      ( 04 May 2023 12:03 )             38.9     05-04    141  |  104  |  19  ----------------------------<  85  4.2   |  22  |  0.95    Ca    8.8      04 May 2023 12:03    TPro  7.1  /  Alb  4.0  /  TBili  0.2  /  DBili  x   /  AST  18  /  ALT  22  /  AlkPhos  82  05-04    PT/INR - ( 04 May 2023 12:03 )   PT: 12.0 sec;   INR: 1.04 ratio         PTT - ( 04 May 2023 12:03 )  PTT:34.8 sec      Rheumatology Work Up    Anticardiolipin Antibody Level, Total: Negative [Method: EIA] (01-20-23 @ 04:45)  Beta 2 Glycoprotein 1 Antibody Screen: Negative (01-19-23 @ 05:46)  Anticardiolipin Antibody Level, Total: Negative [Method: EIA] (01-19-23 @ 05:46)  Proteinase 3 Antibody Assay: <5.0 Units (12-30-22 @ 20:00)        RADIOLOGY & ADDITIONAL STUDIES:  < from: MR Head w/wo IV Cont (04.26.23 @ 19:59) >    IMPRESSION:    1.  RIGHT CAROTID NECK CIRCULATION:   Intact.    2.  LEFT CAROTID NECK CIRCULATION:    Intact.    3.  VERTEBRAL NECK CIRCULATION:   Intact    4.  ANTERIOR INTRACRANIAL CIRCULATION:     Intact.    5.  POSTERIOR INTRACRANIAL CIRCULATION:   Tortuosity/mild dolichoectasia.    6.  BRAIN:   No evidence of acute infarction. Indistinct pathologic   enhancement involves the basal ganglia and thalami, most evident in the   left putamen where it is associated with faint hyperintensity on FLAIR   images ; this finding is nonspecific but suggests an inflammatory process   ; similar enhancement is noted on previous such as MR 12/9/2022, more   pronounced than on 1/24/2023.  Cerebral hemispheric white matter infarcts   with additional subcortical cerebral hemispheric white matter lesions,   nonspecific, early manifestation of ischemic white matter disease versus   inflammatory. Left thalamic pontine remote petechial hemorrhage ; central   upper pontine lesion corresponds to coarse consultation on CT,   indeterminate for previous hemorrhage    < end of copied text >    < from: CT Abdomen and Pelvis No Cont (01.19.23 @ 16:52) >    ACC: [id number] EXAM:  CT ABDOMEN AND PELVIS   ORDERED BY: SG LEO DATE:  01/19/2023          INTERPRETATION:  CLINICAL INFORMATION: Anemia. Evaluate for   retroperitoneal hematoma. Status post left leg compartment fasciotomy for   compartment syndrome on 1/18/2023.    COMPARISON: CT chest, abdomen, and pelvis 1/1/2023.    CONTRAST/COMPLICATIONS:  IV Contrast: None  Oral Contrast: None  Complications: None immediate    PROCEDURE:  CT of the Abdomen and Pelvis was performed.  Sagittal and coronal reformats were performed.    FINDINGS:  Limited evaluation of the vasculature and viscera in the absence of   intravenous contrast.    LOWER CHEST: Hypoattenuation of the blood pool relative to myocardium,   compatible with anemia.    LIVER: Hepatomegaly, measuring approximately 20 cm craniocaudally in the   right lobe. Low attenuation of the liver, compatible with steatosis.  BILE DUCTS: Normal caliber.  GALLBLADDER: Within normal limits.  SPLEEN: Within normal limits.  PANCREAS: Within normal limits.  ADRENALS: Within normal limits.  KIDNEYS/URETERS: Within normal limits.    BLADDER: Within normal limits.  REPRODUCTIVE ORGANS: The prostate is mildly enlarged.    BOWEL: No bowel obstruction. Appendix is normal. No obvious abnormality   of the rectum, as questioned on the prior CT exam, noting limited   evaluation due to underdistention and lack of intravenous contrast.  PERITONEUM: No ascites.  VESSELS: Atherosclerotic changes.  RETROPERITONEUM/LYMPH NODES: Unchanged mildly prominent left external   iliac lymph nodes, nonspecific, with reference lymph node measuring 1.2 x   0.8 cm (series 2 image 125). No retroperitoneal hematoma.  ABDOMINAL WALL: Small fat-containing left inguinal hernia. Small   widemouth umbilical hernia containing fat and nonobstructed small bowel.   Incompletely imaged asymmetric subcutaneous soft tissue   infiltration/edema within the visualized proximal lateral left thigh.  BONES: Degenerative changes.    IMPRESSION:  No retroperitoneal hematoma.    Subcutaneous soft tissue infiltration/edema within the visualized   proximal lateral left thigh.    < end of copied text >    < from: MR Angio Neck No Cont (04.26.23 @ 19:59) >  IMPRESSION:    1.  RIGHT CAROTID NECK CIRCULATION:   Intact.    2.  LEFT CAROTID NECK CIRCULATION:    Intact.    3.  VERTEBRAL NECK CIRCULATION:   Intact    4.  ANTERIOR INTRACRANIAL CIRCULATION:     Intact.    5.  POSTERIOR INTRACRANIAL CIRCULATION:   Tortuosity/mild dolichoectasia.    < end of copied text >       Lukasz Trotter MD, PGY-4  Rheumatology Fellow  [employer]    TAMI BARKLEYS  [id number]    HISTORY OF PRESENT ILLNESS:  52 y.o M with mulitple TIA/Strokes. TIAs (2011, 2013), CVAs x3 (2/2022, 8/3/2022, 11/5/2022) w/residual expressive aphasia on Eliquis, dyslipidemia and questionable ASD/PFO who presents to the hospital after being found with a new DVT.    The patient has a complex medical history with multiple prior admissions as well as a protracted work up.  The patient was admitted to [facility] in  after patient was found unresponsive at home. At the ER, patient was found covered with dried blood in his mouth, and multiple ecchymosis in his arms, face, chest. His VS as remarkable for fever 105.4, tachycardia 130s, /110 mmHg, and tachypnea of 30. Patient was intubated for airway protection and started on empiric antibiotics. 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 Rheumatology work up at that point was negative.      EEG was performed and show no seizure and only moderate to severe nonspecific diffuse or multifocal cerebral dysfunction. TTE was performed and no PFO was noticed and EF 57%. His CPK 8720 and patient was treated as rhabdomyolysis On 12/5/22, patient had a RR w/tongue laceration w/excess blood and posturing like movement. Patient received ativan dose and started on Keppra MIMI was performed and showed small PFO w/possible risj of paradoxical embolism and loops recorded implantation was placed. Repeat EEG on 12/7/22 showed electrographic seizures captured but R LPDs, rare L frontal discharges, severe GRDA. Patient remained with fever and LP was considered. On 12/8/22 patient was extubated and started to following commands. ON 12/12 patient had arthrocentesis of left knee by ortho on 12/12/22 which technically showed blood. LP was performed on 12/5 showed glucose and protein normal, no infection and negative for paraneoplastic encephalitis.     During the December 2022/January 2023 admission patient was found to have bilateral lentiform nucleus and pontine enhancement and LAD in abdomen as described below. After that admission the patient was discharged to rehab. At rehab he was found to have altered mental status and as admitted back to the hospital in 1-2023. That hospitalization was significant for LLE compartment syndrome. The patient had repeat neurology work up with negative LP and MR from 1/24/23 showing chronic R frontal and R semiovale infarcts. Patient with pelvic LAD not amenable to biopsy per IR during that hospitalization. The patient also had fasciotomy on 1/18/23 for left leg compartment syndrome. Patient was evaluated as an outpatient with being found to have a new DVT despite eliquis     Labs:   Positive: cryoglobulins with cryocrit 34    Negative: SHAY/dsDNA/Sm/MPO/PR3/C3/C4/RF/CCP/Sjogrens/LAC/B2GPI/aCL/atypical APLS/scleroderma/centromere/IgG4/ACE    Rheum ROS    Denies fevers/chills/weight loss/night sweats/alopecia/sinus disease/asthma history/oral ulcers/vision changes/Raynauds/sicca symptoms/urinary changes/edema/SOB/    Endorses 20 lb weight gain after losing 40 lbs from being in the hospital. Endorses globus sensation x few days.    MEDICATIONS  (STANDING):  atorvastatin 40 milliGRAM(s) Oral at bedtime  dextrose 5%. 1000 milliLiter(s) (100 mL/Hr) IV Continuous <Continuous>  dextrose 5%. 1000 milliLiter(s) (50 mL/Hr) IV Continuous <Continuous>  dextrose 50% Injectable 25 Gram(s) IV Push once  dextrose 50% Injectable 12.5 Gram(s) IV Push once  dextrose 50% Injectable 25 Gram(s) IV Push once  gabapentin 300 milliGRAM(s) Oral three times a day  glucagon  Injectable 1 milliGRAM(s) IntraMuscular once  heparin  Infusion.  Unit(s)/Hr (18 mL/Hr) IV Continuous <Continuous>  lacosamide 100 milliGRAM(s) Oral two times a day  levETIRAcetam 1500 milliGRAM(s) Oral <User Schedule>  multivitamin 1 Tablet(s) Oral daily    MEDICATIONS  (PRN):  acetaminophen     Tablet .. 650 milliGRAM(s) Oral every 6 hours PRN Temp greater or equal to 38C (100.4F), Mild Pain (1 - 3)  dextrose Oral Gel 15 Gram(s) Oral once PRN Blood Glucose LESS THAN 70 milliGRAM(s)/deciliter  heparin   Injectable 8500 Unit(s) IV Push every 6 hours PRN For aPTT less than 40  heparin   Injectable 4000 Unit(s) IV Push every 6 hours PRN For aPTT between 40 - 57  oxyCODONE    IR 5 milliGRAM(s) Oral every 6 hours PRN Moderate Pain (4 - 6)      PERTINENT MEDICATION HISTORY:    SOCIAL HISTORY: denied illicit drug use  OCCUPATION: worked as a pianist  TRAVEL HISTORY:    FAMILY HISTORY:  no known disease      Vital Signs Last 24 Hrs  T(C): 36.7 (04 May 2023 11:40), Max: 36.7 (04 May 2023 11:40)  T(F): 98 (04 May 2023 11:40), Max: 98 (04 May 2023 11:40)  HR: 71 (04 May 2023 11:40) (71 - 79)  BP: 120/78 (04 May 2023 11:40) (120/78 - 123/85)  BP(mean): 92 (04 May 2023 11:40) (92 - 92)  RR: 20 (04 May 2023 11:40) (20 - 20)  SpO2: 96% (04 May 2023 11:40) (96% - 99%)    Parameters below as of 04 May 2023 11:40  Patient On (Oxygen Delivery Method): room air        Physical Exam:  General: No apparent distress  HEENT: EOMI, MMM  CVS: +S1/S2, RRR, no murmurs/rubs/gallops  Resp: CTA b/l. No crackles/wheezing  GI: Soft, NT/ND +BS  MSK: no swelling/warmth/erythema of the joints of the UE/LE, LLE s/p fasciotomy. no wrist drop or foot drop in right foot. left foot with decreased strength  Neuro: AAOx3  Skin: LLE s/p fasciotomy with still healing fasciotomy wounds with serous discharge on dressing. no purulence    LABS:                        12.8   7.16  )-----------( 194      ( 04 May 2023 12:03 )             38.9     05-04    141  |  104  |  19  ----------------------------<  85  4.2   |  22  |  0.95    Ca    8.8      04 May 2023 12:03    TPro  7.1  /  Alb  4.0  /  TBili  0.2  /  DBili  x   /  AST  18  /  ALT  22  /  AlkPhos  82  05-04    PT/INR - ( 04 May 2023 12:03 )   PT: 12.0 sec;   INR: 1.04 ratio         PTT - ( 04 May 2023 12:03 )  PTT:34.8 sec      Rheumatology Work Up    Anticardiolipin Antibody Level, Total: Negative [Method: EIA] (01-20-23 @ 04:45)  Beta 2 Glycoprotein 1 Antibody Screen: Negative (01-19-23 @ 05:46)  Anticardiolipin Antibody Level, Total: Negative [Method: EIA] (01-19-23 @ 05:46)  Proteinase 3 Antibody Assay: <5.0 Units (12-30-22 @ 20:00)        RADIOLOGY & ADDITIONAL STUDIES:  < from: MR Head w/wo IV Cont (04.26.23 @ 19:59) >    IMPRESSION:    1.  RIGHT CAROTID NECK CIRCULATION:   Intact.    2.  LEFT CAROTID NECK CIRCULATION:    Intact.    3.  VERTEBRAL NECK CIRCULATION:   Intact    4.  ANTERIOR INTRACRANIAL CIRCULATION:     Intact.    5.  POSTERIOR INTRACRANIAL CIRCULATION:   Tortuosity/mild dolichoectasia.    6.  BRAIN:   No evidence of acute infarction. Indistinct pathologic   enhancement involves the basal ganglia and thalami, most evident in the   left putamen where it is associated with faint hyperintensity on FLAIR   images ; this finding is nonspecific but suggests an inflammatory process   ; similar enhancement is noted on previous such as MR 12/9/2022, more   pronounced than on 1/24/2023.  Cerebral hemispheric white matter infarcts   with additional subcortical cerebral hemispheric white matter lesions,   nonspecific, early manifestation of ischemic white matter disease versus   inflammatory. Left thalamic pontine remote petechial hemorrhage ; central   upper pontine lesion corresponds to coarse consultation on CT,   indeterminate for previous hemorrhage    < end of copied text >    < from: CT Abdomen and Pelvis No Cont (01.19.23 @ 16:52) >    ACC: [id number] EXAM:  CT ABDOMEN AND PELVIS   ORDERED BY: SG LEO DATE:  01/19/2023          INTERPRETATION:  CLINICAL INFORMATION: Anemia. Evaluate for   retroperitoneal hematoma. Status post left leg compartment fasciotomy for   compartment syndrome on 1/18/2023.    COMPARISON: CT chest, abdomen, and pelvis 1/1/2023.    CONTRAST/COMPLICATIONS:  IV Contrast: None  Oral Contrast: None  Complications: None immediate    PROCEDURE:  CT of the Abdomen and Pelvis was performed.  Sagittal and coronal reformats were performed.    FINDINGS:  Limited evaluation of the vasculature and viscera in the absence of   intravenous contrast.    LOWER CHEST: Hypoattenuation of the blood pool relative to myocardium,   compatible with anemia.    LIVER: Hepatomegaly, measuring approximately 20 cm craniocaudally in the   right lobe. Low attenuation of the liver, compatible with steatosis.  BILE DUCTS: Normal caliber.  GALLBLADDER: Within normal limits.  SPLEEN: Within normal limits.  PANCREAS: Within normal limits.  ADRENALS: Within normal limits.  KIDNEYS/URETERS: Within normal limits.    BLADDER: Within normal limits.  REPRODUCTIVE ORGANS: The prostate is mildly enlarged.    BOWEL: No bowel obstruction. Appendix is normal. No obvious abnormality   of the rectum, as questioned on the prior CT exam, noting limited   evaluation due to underdistention and lack of intravenous contrast.  PERITONEUM: No ascites.  VESSELS: Atherosclerotic changes.  RETROPERITONEUM/LYMPH NODES: Unchanged mildly prominent left external   iliac lymph nodes, nonspecific, with reference lymph node measuring 1.2 x   0.8 cm (series 2 image 125). No retroperitoneal hematoma.  ABDOMINAL WALL: Small fat-containing left inguinal hernia. Small   wide mouth umbilical hernia containing fat and nonobstructed small bowel.   Incompletely imaged asymmetric subcutaneous soft tissue   infiltration/edema within the visualized proximal lateral left thigh.  BONES: Degenerative changes.    IMPRESSION:  No retroperitoneal hematoma.    Subcutaneous soft tissue infiltration/edema within the visualized   proximal lateral left thigh.    < end of copied text >    < from: MR Angio Neck No Cont (04.26.23 @ 19:59) >  IMPRESSION:    1.  RIGHT CAROTID NECK CIRCULATION:   Intact.    2.  LEFT CAROTID NECK CIRCULATION:    Intact.    3.  VERTEBRAL NECK CIRCULATION:   Intact    4.  ANTERIOR INTRACRANIAL CIRCULATION:     Intact.    5.  POSTERIOR INTRACRANIAL CIRCULATION:   Tortuosity/mild dolichoectasia.    < end of copied text >

## 2023-05-04 NOTE — H&P ADULT - NSHPSOURCEINFOTX_GEN_ALL_CORE
Patient's aunt in attendance with patient's permission. Patient's aunt in attendance with patient's permission.  Reviewed Medex with patient/family in attendance.   [facility] Stop #[id number]

## 2023-05-04 NOTE — ED ADULT TRIAGE NOTE - HEIGHT IN FEET
6 functional limitations in following categories/impairments found/risk reduction/prevention/rehab potential

## 2023-05-04 NOTE — H&P ADULT - NSHPLABSRESULTS_GEN_ALL_CORE
Chest radiograph interpreted by me with no acute infiltrate or effusion.   + loop recorder.    WBC 7.1  normal differential.    Hgb 12.8    Platelets of 194K    Random glucose of 85    Cr 0.9    K+ 4.2    5/3/23 Duplex with acute RIGHT LE DVT.  LEFT popliteal prior DVT. Chest radiograph interpreted by me with no acute infiltrate or effusion.   + loop recorder.    EKG tracing interpreted by me with NSR a t69    WBC 7.1  normal differential.    Hgb 12.8    Platelets of 194K    Random glucose of 85    Cr 0.9    K+ 4.2    5/3/23 Duplex with acute RIGHT LE DVT.  LEFT popliteal prior DVT.

## 2023-05-04 NOTE — H&P ADULT - PROBLEM SELECTOR PLAN 1
See above.    Apparent + cryoglobulin on outpatient assay with workup to dovetail with prior nondiagnostic workup with patient presenting with NEW RIGHT LE DVT.    Patient/family agree with continuation of full AC as above.    Await full Rheumatology recommendations.

## 2023-05-04 NOTE — PATIENT PROFILE ADULT - FALL HARM RISK - HARM RISK INTERVENTIONS

## 2023-05-04 NOTE — ED ADULT NURSE NOTE - NSIMPLEMENTINTERV_GEN_ALL_ED
Implemented All Fall with Harm Risk Interventions:  [place] to call system. Call bell, personal items and telephone within reach. Instruct patient to call for assistance. Room bathroom lighting operational. Non-slip footwear when patient is off stretcher. Physically safe environment: no spills, clutter or unnecessary equipment. Stretcher in lowest position, wheels locked, appropriate side rails in place. Provide visual cue, wrist band, yellow gown, etc. Monitor gait and stability. Monitor for mental status changes and reorient to person, place, and time. Review medications for side effects contributing to fall risk. Reinforce activity limits and safety measures with patient and family. Provide visual clues: red socks.

## 2023-05-04 NOTE — ED ADULT NURSE NOTE - OBJECTIVE STATEMENT
51 yo male with pmh L leg compartment syndrome s/p fasciotomy, L leg DVT on AC, seizures presents to ED by MD for R leg DVT. Pt reports 53 yo male with pmh L leg compartment syndrome s/p fasciotomy, L leg DVT on AC, seizures presents to ED by rheumatologist for R leg DVT seen on outpt imaging yesterday and "elevated cryoglobulins." Pt denies RLE swelling, calf pain, erythema, numbness/tingling, cp, sob, palpitations, dizziness. Pt reports baseline LLE swelling and pain/numbness. Pt a&ox3, breathing spontaneous and unlabored, able to move all extremities and follow commands, no swelling or redness noted to RLE, sensation intact, b/l legs warm, LLE dressing clean dry and intact. Bed locked and in lowest position, side rails raised, red socks in place. MD at bedside.

## 2023-05-04 NOTE — CONSULT NOTE ADULT - ATTENDING COMMENTS
I personally  interviewed and examined the patient agree with rheumatology fellow's  note with my edits as above

## 2023-05-04 NOTE — H&P ADULT - PROBLEM SELECTOR PLAN 2
See above.  Doubt acute CNS episode but CTT head ordered.  Aspiration precautions, S/S, dysphagia screen.  NPO x medications.    If no acute process with CTT head, would consider formal Neurology evaluation in the AM.

## 2023-05-04 NOTE — H&P ADULT - HISTORY OF PRESENT ILLNESS
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 Muir from 4/25/23--patient with a history of multiple TIA/ CVA apparently dating to 2011, 2013 (unclear to mechanism), with no interval episodes until Feb 2022, Aug 2022 and Nov 2022 with residual expressive aphasia maintained on apixaban, hyperlipidemia and apparently questionable ASD/ PFO with an admission to [facility] in Dec 2022 with patient noted unresponsive at home with patient noted to have blood in the mouth with scattered ecchymoses in the arms, face, chest with fever at the time, elevated BP and patient intubated for airway protection at the time with empiric antibiotics for presumed aspiration syndrome, with CTT head nondiagnostic other than old Ca++ mid alexis, EEG nondiagnostic, echo at the time with NO PFO, with an event on 12/5/22 with a Rapid Response call with tongue laceration and posturing concern for status epilepticus w/ patient started on Keppra, MIMI with small PFO noted.    EEG with seizures captured.  LP for fever negative for paraneoplastic encephalitis.   Patient was extubated on 12/8/22.  MRI brain with B/L lentiform nucleus and pontine enhancement, but with NS did not recommend tissue sampling.  Patient was readmitted to [facility] on 1/18/23 following delirium at the rehab with patient's parents noting patient to have confusion and word searching difficulty, patient underwent an emergent LEFT LE fasciotomy following presentation with compartment syndrome, with short course of IV Zosyn and avoidance of LEFT BKA,       Patient relates decade long history of generalized undifferentiated fatigue since his college years, with apparently a previous neurologist prescribing Adderall.   Patient denies acute LEFT leg pain with prior fasciotomy.  NO HA, no new focal weakness.  NO fever, no chills, no rigors.  NO chest pain/pressure.  NO dyspnoea.      Patient does report intermittent globus sensation with food 2 days ago.   Denies coughing with solids/liquids.  NO weight loss or anorexia. NIGHT HOSPITALIST:    Patient UNKNOWN to me previously, assigned to me at this point via the ER and by Dr. 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 Muir from 4/25/23--patient with a history of multiple TIA/ CVA apparently dating to 2011, 2013 (unclear to mechanism), with no interval episodes until Feb 2022, Aug 2022 and Nov 2022 with residual expressive aphasia maintained on apixaban, hyperlipidemia and apparently questionable ASD/ PFO with an admission to [facility] in Dec 2022 with patient noted unresponsive at home with patient noted to have blood in the mouth with scattered ecchymoses in the arms, face, chest with fever at the time, elevated BP and patient intubated for airway protection at the time with empiric antibiotics for presumed aspiration syndrome, with CTT head nondiagnostic other than old Ca++ mid alexis, EEG nondiagnostic, echo at the time with NO PFO, with an event on 12/5/22 with a Rapid Response call with tongue laceration and posturing concern for status epilepticus w/ patient started on Keppra, MIMI with small PFO noted.    EEG with seizures captured.  LP for fever negative for paraneoplastic encephalitis.   Patient was extubated on 12/8/22.  MRI brain with B/L lentiform nucleus and pontine enhancement, but with NS did not recommend tissue sampling.  Patient was readmitted to [facility] on 1/18/23 following delirium at the rehab with patient's parents noting patient to have confusion and word searching difficulty, patient underwent an emergent LEFT LE fasciotomy following presentation with compartment syndrome, chronic LEFT foot drop, with short course of IV Zosyn and avoidance of LEFT BKA,       Patient relates decade long history of generalized undifferentiated fatigue since his college years, with apparently a previous neurologist prescribing Adderall.   Patient denies acute LEFT leg pain with prior fasciotomy.  NO HA, no new focal weakness.  NO fever, no chills, no rigors.  NO chest pain/pressure.  NO dyspnoea.      Patient does report intermittent globus sensation with food 2 days ago.   Denies coughing with solids/liquids.  NO weight loss or anorexia.

## 2023-05-04 NOTE — ED PROVIDER NOTE - ATTENDING CONTRIBUTION TO CARE
53 yo male with hx of DVT on eliquis, hx of compartment syndrome, seizures, prolonged hospitalization in [place] currently sent in by rheumatologist after patient was found to have RLE DVT on imaging yesterday.  Bloodwork found to have elevated cryoglobulins.  He currently denies any new symptoms.  Has chronic LLE pain and numbness.    LLE extemity in dressing, numb in dorsal foot with foot drop, bilateral legs warm, RLE sensation and motor function intact, no edema, RRR, adomen soft, normal mentation and respiratory effort.    Will obtain labs start heparin gtt reach out to rheumatologist and admit for further workup as he had DVT despite being on AC.

## 2023-05-04 NOTE — ED PROVIDER NOTE - CLINICAL SUMMARY MEDICAL DECISION MAKING FREE TEXT BOX
52M on AC with breakthrough DVTs w/o new symptoms. Concern for outpatient therapy failure with possible underlying etiology to explain both cryoglubulinemia and persistent clotting. Patient to be admitted to Dr. Diez with initial set of labs: complete blood count, comprehensive metabolic panel, pt/ptt.

## 2023-05-04 NOTE — ED PROVIDER NOTE - OBJECTIVE STATEMENT
52M past medical history TIA, CVA, chronic left DVT on Eliquis, previous compartment syndrome managed with fasciotomy on last admission in Feb 2023, sent in by his rheumatologist Dr. Muir for a newly discovered R DVT on outpatient US yesterday in the setting of elevated cryoglobulins. He denies any leg pain, swelling, chest pain, shortness of breath, fevers, chills. He has been adherent to his Eliquis regimen. Fasciotomy scars are healing well with some serosanguinous discharge.

## 2023-05-04 NOTE — H&P ADULT - PROBLEM SELECTOR PLAN 3
See above.   If no acute process with CTT head, would consider formal Neurology evaluation in the AM. See above.   If no acute process with CTT head, would consider formal Neurology evaluation in the AM.    Addendum:  5/4/23   1655   I reviewed with Radiology on call personally:  NO bleed, no mass.

## 2023-05-04 NOTE — H&P ADULT - NSHPREVIEWOFSYSTEMS_GEN_ALL_CORE
NO HA< no focal weakness.  NO chest pain/pressure.  NO palpitations.  NO fever, no chills, no rigors.  NO abdominal pain, no red blood per rectum or melena.  NO back pain, no tearing back pain.    NO rash.  NO joint pain or swelling.  NO dysuria, no hematuria.  NO node swelling.  NO thyroid symptoms.  NO SI/HI>    Patient reports COVID-19 vaccine x 3.

## 2023-05-04 NOTE — H&P ADULT - ASSESSMENT
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  Apparent prior hypercoagulable workup nondiagnostic.   Emotional support provided to patient and patient's aunt in attendance, with the referral by Rheumatology in the process for the search for a unifying diagnosis for patient's course.    Patient/ family aware of risks/benefits of full AC and agree to continue with heparin gtt as such.  Await full recommendations by rheumatology, including apparent finding of cryoglobulins on outpatient assay.    Doubt interval CNS event, but will obtain CTT head to exclude interval event with patient's subjective globus sensation.  Will assume aspiration risk for now.  Dysphagia screen, S/S evaluation.  NPO x for medications for now.   FS to monitor for hypoglycemia.    Would consider formal Neurology evaluation in the AM, unless and acute interval neurologic event intervenes.    Will obtain Wound Care evaluation and would consider formal Vascular Surgery followup in the AM.

## 2023-05-04 NOTE — CONSULT NOTE ADULT - ASSESSMENT
52 y.o M with mulitple TIA/Strokes. TIAs (2011, 2013), CVAs x3 (2/2022, 8/3/2022, 11/5/2022) w/residual expressive aphasia on Eliquis, dyslipidemia and questionable ASD/PFO who presents to the hospital after being found with a new DVT found to have positive cryoglobulins on outpatient work up    ##evaluation of cryoglobulinemia  - 52 y.o M with mulitple TIA/Strokes. 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 symptoms of foot drop as patient states his left foot weakness occurred AFTER fasciotomy and not before  -MR angio non cont with posterior intracrainal circulation with tortuosity/mild dolichoectasia    PLAN  -will repeat cryoglobulinemia work up  -c/w heparin gtt for now 52 y.o M with multiple TIA/Strokes. TIAs (2011, 2013), CVAs x3 (2/2022, 8/3/2022, 11/5/2022) w/residual expressive aphasia on Eliquis, dyslipidemia and questionable ASD/ PFO who presents to the hospital after being found with a new DVT found to have positive cryoglobulins on outpatient work up    ## Recurrent strokes  and new acute left LE DVT   - recent multiple testing for LAC/B2GPI/aCL/atypical APLS - negative/ extensive thrombophilia hematol evaluation - negative  ## Positive: cryoglobulins with cryocrit 34 with   Negative: SHAY/dsDNA/Sm/MPO/PR3/C3/C4/RF/CCP/Sjogrens/scleroderma/centromere/IgG4/ACE  - seem unusual to have high level cryo and normal C 3 and C4will repeat cryoglobulin work up (sent)  ##  Left foot dorsiflexion weakness   - patient states his left foot weakness started AFTER fasciotomy, but he is uncertain, but previous clinical exam  - the etiology of left foot drop is unclear- ? could be due to n damage from fasciitis / vs vasculitic process -  unlikely with normal inflammatory  marker    PLAN  -will repeat cryoglobulinemia work up  -c/w heparin gtt for now and transition to Coumadin  - MR angio non cont with posterior intracranial circulation with tortuosity/mild dolichoectasia

## 2023-05-04 NOTE — ED PROVIDER NOTE - PHYSICAL EXAMINATION
General: well appearing, alert, oriented to person, time, place  Psych: mood appropriate  Head: normocephalic; atraumatic  Eyes: conjunctivae clear bilaterally, sclerae anicteric  ENT: no nasal flaring, patent nares  Cardio: RRR, no m/r/g, pulses 2+ b/l  Resp: CATB, no w/r/r  GI: soft/nondistended/nontender  : no CVA tenderness  Neuro: normal sensation, moving all four extremities equally  Skin: No evidence of rash or bruising  MSK: legs have no tenderness to palpation  Lymph/Vasc: no LE edema

## 2023-05-04 NOTE — H&P ADULT - NSHPADDITIONALINFOADULT_GEN_ALL_CORE
NIGHT HOSPITALIST:    Patient/ family aware of course and agree with plan/care as above.   Given patient's comorbidities, patient's long term prognosis is guarded.   Emotional support provided to patient/family.   Care reviewed with covering NP/PA for endorsement to Dr. Diez.    Harinder Greenberg MD

## 2023-05-05 DIAGNOSIS — I67.9 CEREBROVASCULAR DISEASE, UNSPECIFIED: ICD-10-CM

## 2023-05-05 DIAGNOSIS — I82.409 ACUTE EMBOLISM AND THROMBOSIS OF UNSPECIFIED DEEP VEINS OF UNSPECIFIED LOWER EXTREMITY: ICD-10-CM

## 2023-05-05 DIAGNOSIS — R09.89 OTHER SPECIFIED SYMPTOMS AND SIGNS INVOLVING THE CIRCULATORY AND RESPIRATORY SYSTEMS: ICD-10-CM

## 2023-05-05 DIAGNOSIS — Z98.890 OTHER SPECIFIED POSTPROCEDURAL STATES: ICD-10-CM

## 2023-05-05 LAB
ANION GAP SERPL CALC-SCNC: 9 MMOL/L — SIGNIFICANT CHANGE UP (ref 5–17)
APTT BLD: 101 SEC — HIGH (ref 27.5–35.5)
APTT BLD: 140.7 SEC — CRITICAL HIGH (ref 27.5–35.5)
APTT BLD: 64.2 SEC — HIGH (ref 27.5–35.5)
APTT BLD: 74.2 SEC — HIGH (ref 27.5–35.5)
APTT BLD: 79.6 SEC — HIGH (ref 27.5–35.5)
BASOPHILS # BLD AUTO: 0.07 K/UL — SIGNIFICANT CHANGE UP (ref 0–0.2)
BASOPHILS NFR BLD AUTO: 1 % — SIGNIFICANT CHANGE UP (ref 0–2)
BUN SERPL-MCNC: 14 MG/DL — SIGNIFICANT CHANGE UP (ref 7–23)
C3 SERPL-MCNC: 148 MG/DL — SIGNIFICANT CHANGE UP (ref 81–157)
C4 SERPL-MCNC: 35 MG/DL — SIGNIFICANT CHANGE UP (ref 13–39)
CALCIUM SERPL-MCNC: 8.7 MG/DL — SIGNIFICANT CHANGE UP (ref 8.4–10.5)
CHLORIDE SERPL-SCNC: 107 MMOL/L — SIGNIFICANT CHANGE UP (ref 96–108)
CO2 SERPL-SCNC: 25 MMOL/L — SIGNIFICANT CHANGE UP (ref 22–31)
CREAT SERPL-MCNC: 0.86 MG/DL — SIGNIFICANT CHANGE UP (ref 0.5–1.3)
EGFR: 104 ML/MIN/1.73M2 — SIGNIFICANT CHANGE UP
EOSINOPHIL # BLD AUTO: 0.61 K/UL — HIGH (ref 0–0.5)
EOSINOPHIL NFR BLD AUTO: 9.1 % — HIGH (ref 0–6)
GLUCOSE SERPL-MCNC: 97 MG/DL — SIGNIFICANT CHANGE UP (ref 70–99)
HAV IGM SER-ACNC: SIGNIFICANT CHANGE UP
HBV CORE AB SER-ACNC: SIGNIFICANT CHANGE UP
HBV CORE IGM SER-ACNC: SIGNIFICANT CHANGE UP
HBV SURFACE AG SER-ACNC: SIGNIFICANT CHANGE UP
HCT VFR BLD CALC: 36.6 % — LOW (ref 39–50)
HCT VFR BLD CALC: 36.7 % — LOW (ref 39–50)
HCT VFR BLD CALC: 37.2 % — LOW (ref 39–50)
HCV AB S/CO SERPL IA: 0.08 S/CO — SIGNIFICANT CHANGE UP (ref 0–0.99)
HCV AB SERPL-IMP: SIGNIFICANT CHANGE UP
HGB BLD-MCNC: 12.1 G/DL — LOW (ref 13–17)
HGB BLD-MCNC: 12.3 G/DL — LOW (ref 13–17)
HGB BLD-MCNC: 12.4 G/DL — LOW (ref 13–17)
HIV 1+2 AB+HIV1 P24 AG SERPL QL IA: SIGNIFICANT CHANGE UP
IMM GRANULOCYTES NFR BLD AUTO: 1.2 % — HIGH (ref 0–0.9)
INR BLD: 1.05 RATIO — SIGNIFICANT CHANGE UP (ref 0.88–1.16)
LYMPHOCYTES # BLD AUTO: 1.3 K/UL — SIGNIFICANT CHANGE UP (ref 1–3.3)
LYMPHOCYTES # BLD AUTO: 19.5 % — SIGNIFICANT CHANGE UP (ref 13–44)
MCHC RBC-ENTMCNC: 28.7 PG — SIGNIFICANT CHANGE UP (ref 27–34)
MCHC RBC-ENTMCNC: 28.8 PG — SIGNIFICANT CHANGE UP (ref 27–34)
MCHC RBC-ENTMCNC: 29.2 PG — SIGNIFICANT CHANGE UP (ref 27–34)
MCHC RBC-ENTMCNC: 33.1 GM/DL — SIGNIFICANT CHANGE UP (ref 32–36)
MCHC RBC-ENTMCNC: 33.1 GM/DL — SIGNIFICANT CHANGE UP (ref 32–36)
MCHC RBC-ENTMCNC: 33.8 GM/DL — SIGNIFICANT CHANGE UP (ref 32–36)
MCV RBC AUTO: 86.4 FL — SIGNIFICANT CHANGE UP (ref 80–100)
MCV RBC AUTO: 86.9 FL — SIGNIFICANT CHANGE UP (ref 80–100)
MCV RBC AUTO: 87.1 FL — SIGNIFICANT CHANGE UP (ref 80–100)
MONOCYTES # BLD AUTO: 0.76 K/UL — SIGNIFICANT CHANGE UP (ref 0–0.9)
MONOCYTES NFR BLD AUTO: 11.4 % — SIGNIFICANT CHANGE UP (ref 2–14)
NEUTROPHILS # BLD AUTO: 3.86 K/UL — SIGNIFICANT CHANGE UP (ref 1.8–7.4)
NEUTROPHILS NFR BLD AUTO: 57.8 % — SIGNIFICANT CHANGE UP (ref 43–77)
NRBC # BLD: 0 /100 WBCS — SIGNIFICANT CHANGE UP (ref 0–0)
PLATELET # BLD AUTO: 185 K/UL — SIGNIFICANT CHANGE UP (ref 150–400)
PLATELET # BLD AUTO: 187 K/UL — SIGNIFICANT CHANGE UP (ref 150–400)
PLATELET # BLD AUTO: 190 K/UL — SIGNIFICANT CHANGE UP (ref 150–400)
POTASSIUM SERPL-MCNC: 3.8 MMOL/L — SIGNIFICANT CHANGE UP (ref 3.5–5.3)
POTASSIUM SERPL-SCNC: 3.8 MMOL/L — SIGNIFICANT CHANGE UP (ref 3.5–5.3)
PROT SERPL-MCNC: 5.7 G/DL — LOW (ref 6–8.3)
PROT SERPL-MCNC: 5.7 G/DL — LOW (ref 6–8.3)
PROTHROM AB SERPL-ACNC: 12.1 SEC — SIGNIFICANT CHANGE UP (ref 10.5–13.4)
RBC # BLD: 4.2 M/UL — SIGNIFICANT CHANGE UP (ref 4.2–5.8)
RBC # BLD: 4.25 M/UL — SIGNIFICANT CHANGE UP (ref 4.2–5.8)
RBC # BLD: 4.28 M/UL — SIGNIFICANT CHANGE UP (ref 4.2–5.8)
RBC # FLD: 13.4 % — SIGNIFICANT CHANGE UP (ref 10.3–14.5)
RBC # FLD: 13.6 % — SIGNIFICANT CHANGE UP (ref 10.3–14.5)
RBC # FLD: 13.6 % — SIGNIFICANT CHANGE UP (ref 10.3–14.5)
RHEUMATOID FACT SERPL-ACNC: <10 IU/ML — SIGNIFICANT CHANGE UP (ref 0–13)
SODIUM SERPL-SCNC: 141 MMOL/L — SIGNIFICANT CHANGE UP (ref 135–145)
WBC # BLD: 6.01 K/UL — SIGNIFICANT CHANGE UP (ref 3.8–10.5)
WBC # BLD: 6.68 K/UL — SIGNIFICANT CHANGE UP (ref 3.8–10.5)
WBC # BLD: 7.1 K/UL — SIGNIFICANT CHANGE UP (ref 3.8–10.5)
WBC # FLD AUTO: 6.01 K/UL — SIGNIFICANT CHANGE UP (ref 3.8–10.5)
WBC # FLD AUTO: 6.68 K/UL — SIGNIFICANT CHANGE UP (ref 3.8–10.5)
WBC # FLD AUTO: 7.1 K/UL — SIGNIFICANT CHANGE UP (ref 3.8–10.5)

## 2023-05-05 PROCEDURE — 99222 1ST HOSP IP/OBS MODERATE 55: CPT

## 2023-05-05 RX ORDER — HEPARIN SODIUM 5000 [USP'U]/ML
1500 INJECTION INTRAVENOUS; SUBCUTANEOUS
Qty: 25000 | Refills: 0 | Status: DISCONTINUED | OUTPATIENT
Start: 2023-05-05 | End: 2023-05-06

## 2023-05-05 RX ORDER — ENOXAPARIN SODIUM 100 MG/ML
150 INJECTION SUBCUTANEOUS EVERY 24 HOURS
Refills: 0 | Status: DISCONTINUED | OUTPATIENT
Start: 2023-05-06 | End: 2023-05-10

## 2023-05-05 RX ADMIN — HEPARIN SODIUM 1300 UNIT(S)/HR: 5000 INJECTION INTRAVENOUS; SUBCUTANEOUS at 19:13

## 2023-05-05 RX ADMIN — LACOSAMIDE 100 MILLIGRAM(S): 50 TABLET ORAL at 05:26

## 2023-05-05 RX ADMIN — HEPARIN SODIUM 1300 UNIT(S)/HR: 5000 INJECTION INTRAVENOUS; SUBCUTANEOUS at 09:38

## 2023-05-05 RX ADMIN — HEPARIN SODIUM 1300 UNIT(S)/HR: 5000 INJECTION INTRAVENOUS; SUBCUTANEOUS at 19:19

## 2023-05-05 RX ADMIN — HEPARIN SODIUM 1500 UNIT(S)/HR: 5000 INJECTION INTRAVENOUS; SUBCUTANEOUS at 02:58

## 2023-05-05 RX ADMIN — PANTOPRAZOLE SODIUM 40 MILLIGRAM(S): 20 TABLET, DELAYED RELEASE ORAL at 05:26

## 2023-05-05 RX ADMIN — GABAPENTIN 300 MILLIGRAM(S): 400 CAPSULE ORAL at 05:26

## 2023-05-05 RX ADMIN — Medication 81 MILLIGRAM(S): at 13:25

## 2023-05-05 RX ADMIN — HEPARIN SODIUM 1300 UNIT(S)/HR: 5000 INJECTION INTRAVENOUS; SUBCUTANEOUS at 16:51

## 2023-05-05 RX ADMIN — HEPARIN SODIUM 1500 UNIT(S)/HR: 5000 INJECTION INTRAVENOUS; SUBCUTANEOUS at 07:32

## 2023-05-05 RX ADMIN — LACOSAMIDE 100 MILLIGRAM(S): 50 TABLET ORAL at 18:12

## 2023-05-05 RX ADMIN — LEVETIRACETAM 1500 MILLIGRAM(S): 250 TABLET, FILM COATED ORAL at 20:51

## 2023-05-05 RX ADMIN — LEVETIRACETAM 1500 MILLIGRAM(S): 250 TABLET, FILM COATED ORAL at 08:40

## 2023-05-05 RX ADMIN — GABAPENTIN 300 MILLIGRAM(S): 400 CAPSULE ORAL at 13:26

## 2023-05-05 RX ADMIN — GABAPENTIN 300 MILLIGRAM(S): 400 CAPSULE ORAL at 21:38

## 2023-05-05 RX ADMIN — ATORVASTATIN CALCIUM 40 MILLIGRAM(S): 80 TABLET, FILM COATED ORAL at 21:38

## 2023-05-05 RX ADMIN — Medication 1 TABLET(S): at 13:25

## 2023-05-05 NOTE — CONSULT NOTE ADULT - SUBJECTIVE AND OBJECTIVE BOX
Neurology Consult    Reason for Consult: Patient is a 52y old  Male who presents with a chief complaint of Sent to the ER for NEW RIGHT LE DVT on apixaban, globus sensation with food 2 days ago. 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 Muir from 4/25/23--patient with a history of multiple TIA/ CVA apparently dating to 2011, 2013 (unclear to mechanism), with no interval episodes until Feb 2022, Aug 2022 and Nov 2022 with residual expressive aphasia maintained on apixaban, hyperlipidemia and apparently questionable ASD/ PFO with an admission to [facility] in Dec 2022 with patient noted unresponsive at home with patient noted to have blood in the mouth with scattered ecchymoses in the arms, face, chest with fever at the time, elevated BP and patient intubated for airway protection at the time with empiric antibiotics for presumed aspiration syndrome, with CTT head nondiagnostic other than old Ca++ mid alexis, EEG nondiagnostic, echo at the time with NO PFO, with an event on 12/5/22 with a Rapid Response call with tongue laceration and posturing concern for status epilepticus w/ patient started on Keppra, MIMI with small PFO noted.    EEG with seizures captured.  LP for fever negative for paraneoplastic encephalitis.   Patient was extubated on 12/8/22.  MRI brain with B/L lentiform nucleus and pontine enhancement, but with NS did not recommend tissue sampling.  Patient was readmitted to [facility] on 1/18/23 following delirium at the rehab with patient's parents noting patient to have confusion and word searching difficulty, patient underwent an emergent LEFT LE fasciotomy following presentation with compartment syndrome, chronic LEFT foot drop, with short course of IV Zosyn and avoidance of LEFT BKA,       Patient relates decade long history of generalized undifferentiated fatigue since his college years, with apparently a previous neurologist prescribing Adderall.   Patient denies acute LEFT leg pain with prior fasciotomy.  NO HA, no new focal weakness.  NO fever, no chills, no rigors.  NO chest pain/pressure.  NO dyspnoea.      Patient does report intermittent globus sensation with food 2 days ago.   Denies coughing with solids/liquids.  NO weight loss or anorexia. (04 May 2023 17:28)       PAST MEDICAL & SURGICAL HISTORY:  History of TIAs      CVA (cerebrovascular accident)      HLD (hyperlipidemia)      Vertigo      Unresponsiveness      History of fasciotomy          Allergies: Allergies    No Known Allergies    Intolerances        Social History: Denies toxic habits including tobacco, ETOH or illicit drugs.    Family History: FAMILY HISTORY:  No pertinent family history in first degree relatives    . No family history of strokes    Medications: MEDICATIONS  (STANDING):  aspirin enteric coated 81 milliGRAM(s) Oral daily  atorvastatin 40 milliGRAM(s) Oral at bedtime  dextrose 5% + sodium chloride 0.9%. 1000 milliLiter(s) (60 mL/Hr) IV Continuous <Continuous>  dextrose 5%. 1000 milliLiter(s) (50 mL/Hr) IV Continuous <Continuous>  dextrose 5%. 1000 milliLiter(s) (100 mL/Hr) IV Continuous <Continuous>  dextrose 50% Injectable 12.5 Gram(s) IV Push once  dextrose 50% Injectable 25 Gram(s) IV Push once  dextrose 50% Injectable 25 Gram(s) IV Push once  gabapentin 300 milliGRAM(s) Oral three times a day  glucagon  Injectable 1 milliGRAM(s) IntraMuscular once  heparin  Infusion. 1500 Unit(s)/Hr (15 mL/Hr) IV Continuous <Continuous>  lacosamide 100 milliGRAM(s) Oral two times a day  levETIRAcetam 1500 milliGRAM(s) Oral <User Schedule>  multivitamin 1 Tablet(s) Oral daily  pantoprazole    Tablet 40 milliGRAM(s) Oral before breakfast    MEDICATIONS  (PRN):  acetaminophen     Tablet .. 650 milliGRAM(s) Oral every 6 hours PRN Temp greater or equal to 38C (100.4F), Mild Pain (1 - 3)  dextrose Oral Gel 15 Gram(s) Oral once PRN Blood Glucose LESS THAN 70 milliGRAM(s)/deciliter  heparin   Injectable 8500 Unit(s) IV Push every 6 hours PRN For aPTT less than 40  heparin   Injectable 4000 Unit(s) IV Push every 6 hours PRN For aPTT between 40 - 57  oxyCODONE    IR 5 milliGRAM(s) Oral every 6 hours PRN Moderate Pain (4 - 6)      Review of Systems:  CONSTITUTIONAL:  No weight loss, fever, chills, weakness or fatigue.  HEENT:  Eyes:  No visual loss, blurred vision, double vision or yellow sclera. Ears, Nose, Throat:  No hearing loss, sneezing, congestion, runny nose or sore throat.  SKIN:  No rash or itching.  CARDIOVASCULAR:  No chest pain, chest pressure or chest discomfort. No palpitations or edema.  RESPIRATORY:  No shortness of breath, cough or sputum.  GASTROINTESTINAL:  No anorexia, nausea, vomiting or diarrhea. No abdominal pain or blood.  GENITOURINARY:  No burning on urination or incontinence   NEUROLOGICAL:  No headache, dizziness, syncope, paralysis, ataxia, numbness or tingling in the extremities. No change in bowel or bladder control. no limb weakness. no vision changes.   MUSCULOSKELETAL:  No muscle, back pain, joint pain or stiffness.  HEMATOLOGIC:  No anemia, bleeding or bruising.  LYMPHATICS:  No enlarged nodes. No history of splenectomy.  PSYCHIATRIC:  No history of depression or anxiety.  ENDOCRINOLOGIC:  No reports of sweating, cold or heat intolerance. No polyuria or polydipsia.      Vitals:  Vital Signs Last 24 Hrs  T(C): 36.6 (05 May 2023 05:01), Max: 36.7 (04 May 2023 11:40)  T(F): 97.8 (05 May 2023 05:01), Max: 98.1 (04 May 2023 17:46)  HR: 69 (05 May 2023 05:01) (64 - 72)  BP: 119/72 (05 May 2023 05:01) (116/73 - 146/92)  BP(mean): 87 (04 May 2023 17:46) (87 - 92)  RR: 18 (05 May 2023 05:01) (17 - 20)  SpO2: 97% (05 May 2023 05:01) (94% - 97%)    Parameters below as of 05 May 2023 05:01  Patient On (Oxygen Delivery Method): room air        General Exam:   General Appearance: Appropriately dressed and in no acute distress       Head: Normocephalic, atraumatic and no dysmorphic features  Ear, Nose, and Throat: Moist mucous membranes  CVS: S1S2+  Resp: No SOB, no wheeze or rhonchi  GI: soft NT/ND  Extremities: No edema or cyanosis  Skin: No bruises or rashes     Neurological Exam:  Mental Status: Awake, alert and oriented x 3.  Able to follow simple and complex verbal commands. Able to name and repeat. fluent speech. No obvious aphasia or dysarthria noted.   Cranial Nerves: PERRL, EOMI, VFFC, sensation V1-V3 intact,  no obvious facial asymmetry, equal elevation of palate, scm/trap 5/5, tongue is midline on protrusion. no obvious papilledema on fundoscopic exam. hearing is grossly intact.   Motor: Normal bulk, tone and strength throughout L foot drop   Sensation: Intact to light touch and pinprick throughout. no right/left confusion. no extinction to tactile on DSS. Romberg was negative.   Reflexes: 1+ throughout at biceps, brachioradialis, triceps, patellars and ankles bilaterally and equal. No clonus. R toe and L toe were both downgoing.  Coordination: No dysmetria on FNF or HKS  Gait:  walker     Data/Labs/Imaging which I personally reviewed.     Labs:     CBC Full  -  ( 05 May 2023 09:04 )  WBC Count : 6.01 K/uL  RBC Count : 4.25 M/uL  Hemoglobin : 12.4 g/dL  Hematocrit : 36.7 %  Platelet Count - Automated : 190 K/uL  Mean Cell Volume : 86.4 fl  Mean Cell Hemoglobin : 29.2 pg  Mean Cell Hemoglobin Concentration : 33.8 gm/dL  Auto Neutrophil # : x  Auto Lymphocyte # : x  Auto Monocyte # : x  Auto Eosinophil # : x  Auto Basophil # : x  Auto Neutrophil % : x  Auto Lymphocyte % : x  Auto Monocyte % : x  Auto Eosinophil % : x  Auto Basophil % : x    05-05    141  |  107  |  14  ----------------------------<  97  3.8   |  25  |  0.86    Ca    8.7      05 May 2023 06:51    TPro  7.1  /  Alb  4.0  /  TBili  0.2  /  DBili  x   /  AST  18  /  ALT  22  /  AlkPhos  82  05-04    LIVER FUNCTIONS - ( 04 May 2023 12:03 )  Alb: 4.0 g/dL / Pro: 7.1 g/dL / ALK PHOS: 82 U/L / ALT: 22 U/L / AST: 18 U/L / GGT: x           PT/INR - ( 05 May 2023 06:50 )   PT: 12.1 sec;   INR: 1.05 ratio         PTT - ( 05 May 2023 09:04 )  PTT:101.0 sec

## 2023-05-05 NOTE — CONSULT NOTE ADULT - ASSESSMENT
This is a 52 year old male who presents with new R LE DVT    new DVT   -- new DVT despite AC compliance  -- found on outpatient US  -- previous hypercoagulable w/up negative   -- recommended transitioning from heparin to Lovenox  -  rheumatology consulted, cryoglobulinemia work up underway   -- F/u MR Angio as per Rheum  -- [facility]      will follow     Luzmaria Xiong NP  Hematology/ Oncology  [facility]  [phone] (office)  [phone] (alt office)  Evenings and weekends please call MD on call or office

## 2023-05-05 NOTE — CHART NOTE - NSCHARTNOTEFT_GEN_A_CORE
Given that patient developed VTE on eliquis we recommend warfarin as an alternative agent, unless there is an absolute contraindication. Patient is pending repeat serologic work up    Lukasz Trotter MD, PGY-4  Rheumatology Fellow  Reachable on TEAMS

## 2023-05-05 NOTE — SWALLOW BEDSIDE ASSESSMENT ADULT - SWALLOW EVAL: PATIENT/FAMILY GOALS STATEMENT
Patient was eager to eat. SLP Goals: Patient will tolerate least restrictive PO diet by time of discharge without overt s/s penetration/aspiration.

## 2023-05-05 NOTE — SWALLOW BEDSIDE ASSESSMENT ADULT - SLP PERTINENT HISTORY OF CURRENT PROBLEM
EEG nondiagnostic, echo at the time with NO PFO, with an event on 12/5/22 with a Rapid Response call with tongue laceration and posturing concern for status epilepticus w/ patient started on Keppra, MIMI with small PFO noted.    EEG with seizures captured.  LP for fever negative for paraneoplastic encephalitis.   Patient was extubated on 12/8/22.  MRI brain with B/L lentiform nucleus and pontine enhancement, but with NS did not recommend tissue sampling.  Patient was readmitted to [facility] on 1/18/23 following delirium at the rehab with patient's parents noting patient to have confusion and word searching difficulty, patient underwent an emergent LEFT LE fasciotomy following presentation with compartment syndrome, chronic LEFT foot drop, with short course of IV Zosyn and avoidance of LEFT BKA, ...hx continued: EEG nondiagnostic, echo at the time with NO PFO, with an event on 12/5/22 with a Rapid Response call with tongue laceration and posturing concern for status epilepticus w/ patient started on Keppra, MIMI with small PFO noted.    EEG with seizures captured.  LP for fever negative for paraneoplastic encephalitis.   Patient was extubated on 12/8/22.  MRI brain with B/L lentiform nucleus and pontine enhancement, but with NS did not recommend tissue sampling.  Patient was readmitted to [facility] on 1/18/23 following delirium at the rehab with patient's parents noting patient to have confusion and word searching difficulty, patient underwent an emergent LEFT LE fasciotomy following presentation with compartment syndrome, chronic LEFT foot drop, with short course of IV Zosyn and avoidance of LEFT BKA...

## 2023-05-05 NOTE — CONSULT NOTE ADULT - ASSESSMENT
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 Muir from 4/25/23--patient with a history of multiple TIA/ CVA apparently dating to 2011, 2013 (unclear to mechanism), with no interval episodes until Feb 2022, Aug 2022 and Nov 2022 with residual expressive aphasia maintained on apixaban, hyperlipidemia and apparently questionable ASD/ PFO with an admission to [facility] in Dec 2022 with patient noted unresponsive at home with patient noted to have blood in the mouth with scattered ecchymoses in the arms, face, chest with fever at the time, elevated BP and patient intubated for airway protection at the time with empiric antibiotics for presumed aspiration syndrome, with CTT head nondiagnostic other than old Ca++ mid alexis, EEG nondiagnostic, echo at the time with NO PFO, with an event on 12/5/22 with a Rapid Response call with tongue laceration and posturing concern for status epilepticus w/ patient started on Keppra, MIMI with small PFO noted.    EEG with seizures captured.  LP for fever negative for paraneoplastic encephalitis.   Patient was extubated on 12/8/22.  MRI brain with B/L lentiform nucleus and pontine enhancement, but with NS did not recommend tissue sampling.  Patient was readmitted to [facility] on 1/18/23 following delirium at the rehab with patient's parents noting patient to have confusion and word searching difficulty, patient underwent an emergent LEFT LE fasciotomy following presentation with compartment syndrome, chronic LEFT foot drop, with short course of IV Zosyn and avoidance of LEFT BKA,     multiple testing for LAC/B2GPI/aCL/atypical APLS - negative/ extensive thrombophilia hematol evaluation - negative  Positive: cryoglobulins with cryocrit 34 with negative: SHAY/dsDNA/Sm/MPO/PR3/C3/C4/RF/CCP/Sjogrens/scleroderma/centromere/IgG4/ACE    Impression:   patient previously was on testosterone supplements and was though his prior embolic strokes 2/2 hormonal supplement.   he had result seizures     - patient has been on Eliquis 5mg BID and ASA 81mg daily and still had breakthrough DVT  - should be on statin for secondary stroke preventoin  - f/u hematology.  now on heparin drip.  patient now may need to be on coumadin or lovenox; sees Dr. Varela outpatient   - f/u rheum  - has been following at [facility] as well and they also don't seem to have a diagnosis   - c/w keppra and vimpat for seiuzre ppx.   - consider MRI L spine   - check FS, glucose control <180  - GI/DVT ppx  - Counseling on diet, exercise, and medication adherence was done  - Counseling on smoking cessation and alcohol consumption offered when appropriate.  - Pain assessed and judicious use of narcotics when appropriate was discussed.    - Stroke education given when appropriate.  - Importance of fall prevention discussed.   - Differential diagnosis and plan of care discussed with patient and/or family and primary team  - Thank you for allowing me to participate in the care of this patient. Call with questions.   - [facility] family friend via phone Winter Forde MD  Vascular Neurology  Office: [phone]

## 2023-05-05 NOTE — SWALLOW BEDSIDE ASSESSMENT ADULT - SWALLOW EVAL: DIAGNOSIS
52y old  Male who presents with a chief complaint of Sent to the ER for NEW RIGHT LE DVT on apixaban, globus sensation with food 2 days ago, now being evaluated for dysphagia. Patient is well-known to this service (first seen in 2010, last seen for MBS in 12/22 with recommendation for easy to chew/thins). Patient describes a new onset globus sensation (only lasting a few days). Oropharyngeal swallow suspected to be grossly function for tested consistencies (thin liquids, puree, regular solid), with no overt s/s penetration/aspiration and no endorsed globus sensation. Objective assessment is warranted to r/o any structural/upper esophageal findings contributing to new complaints of globus sensation. 52y old  Male Sent to the ER for NEW RIGHT LE DVT on apixaban, globus sensation with food 2 days ago, now being evaluated for dysphagia. Patient is well-known to this service (first seen in 2010, last seen for MBS in 12/22 with recommendation for easy to chew/thins). Patient describes a new onset globus sensation (only lasting a few days). Oropharyngeal swallow suspected to be grossly function for tested consistencies (thin liquids, puree, regular solid), with no overt s/s penetration/aspiration and no endorsed globus sensation. Objective assessment is warranted to r/o any structural/upper esophageal findings contributing to new complaints of globus sensation.

## 2023-05-05 NOTE — SWALLOW BEDSIDE ASSESSMENT ADULT - H & P REVIEW
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 Muir from 4/25/23--patient with a history of multiple TIA/ CVA apparently dating to 2011, 2013 (unclear to mechanism), with no interval episodes until Feb 2022, Aug 2022 and Nov 2022 with residual expressive aphasia maintained on apixaban, hyperlipidemia and apparently questionable ASD/ PFO with an admission to [facility] in Dec 2022 with patient noted unresponsive at home with patient noted to have blood in the mouth with scattered ecchymoses in the arms, face, chest with fever at the time, elevated BP and patient intubated for airway protection at the time with empiric antibiotics for presumed aspiration syndrome, with CTT head nondiagnostic other than old Ca++ mid alexis,/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 Muir from 4/25/23--patient with a history of multiple TIA/ CVA apparently dating to 2011, 2013 (unclear to mechanism), with no interval episodes until Feb 2022, Aug 2022 and Nov 2022 with residual expressive aphasia maintained on apixaban, hyperlipidemia and apparently questionable ASD/ PFO with an admission to [facility] in Dec 2022 with patient noted unresponsive at home with patient noted to have blood in the mouth with scattered ecchymoses in the arms, face, chest with fever at the time, elevated BP and patient intubated for airway protection at the time with empiric antibiotics for presumed aspiration syndrome, with CTT head nondiagnostic other than old Ca++ mid alexis.../yes

## 2023-05-05 NOTE — SWALLOW BEDSIDE ASSESSMENT ADULT - COMMENTS
H&P Continued: Patient relates decade long history of generalized undifferentiated fatigue since his college years, with apparently a previous neurologist prescribing Adderall.   Patient denies acute LEFT leg pain with prior fasciotomy.  NO HA, no new focal weakness.  NO fever, no chills, no rigors.  NO chest pain/pressure.  NO dyspnoea.  Patient does report intermittent globus sensation with food 2 days ago.   Denies coughing with solids/liquids.  NO weight loss or anorexia.    CT Head 5/4:  IMPRESSION:  No significant interval change 1/18/2023. Atrophy out of   proportion for age. Microvascular ischemic changes are suggested in the   periventricular and subcortical white matter. No new pathology.    IMPRESSION:  Right lower lobe hazy opacity, likely atelectasis. Increased perihilar   opacities, suggestive of mild pulmonary vascular congestion versus   vascular crowding from low lung volumes. H&P Continued: Patient relates decade long history of generalized undifferentiated fatigue since his college years, with apparently a previous neurologist prescribing Adderall.   Patient denies acute LEFT leg pain with prior fasciotomy.  NO HA, no new focal weakness.  NO fever, no chills, no rigors.  NO chest pain/pressure.  NO dyspnoea.  Patient does report intermittent globus sensation with food 2 days ago.   Denies coughing with solids/liquids.  NO weight loss or anorexia.    CT Head 5/4:  IMPRESSION:  No significant interval change 1/18/2023. Atrophy out of   proportion for age. Microvascular ischemic changes are suggested in the   periventricular and subcortical white matter. No new pathology.    IMPRESSION:  Right lower lobe hazy opacity, likely atelectasis. Increased perihilar   opacities, suggestive of mild pulmonary vascular congestion versus   vascular crowding from low lung volumes.    Brain MRI 4/23: ...H&P Continued: Patient relates decade long history of generalized undifferentiated fatigue since his college years, with apparently a previous neurologist prescribing Adderall.   Patient denies acute LEFT leg pain with prior fasciotomy.  NO HA, no new focal weakness.  NO fever, no chills, no rigors.  NO chest pain/pressure.  NO dyspnoea.  Patient does report intermittent globus sensation with food 2 days ago.   Denies coughing with solids/liquids.  NO weight loss or anorexia.    CT Head 5/4:  IMPRESSION:  No significant interval change 1/18/2023. Atrophy out of   proportion for age. Microvascular ischemic changes are suggested in the   periventricular and subcortical white matter. No new pathology.    IMPRESSION:  Right lower lobe hazy opacity, likely atelectasis. Increased perihilar   opacities, suggestive of mild pulmonary vascular congestion versus   vascular crowding from low lung volumes.

## 2023-05-05 NOTE — PROVIDER CONTACT NOTE (CRITICAL VALUE NOTIFICATION) - ACTION/TREATMENT ORDERED:
Stop Heparin Drip for an hr. Restart drip at 15 ml/hr. Provider to D/c current order and reorder at rate of 15 ml/hr based of nomogram

## 2023-05-05 NOTE — PROGRESS NOTE ADULT - ASSESSMENT
Patient is a 52 year old Male with a PMHx of TIA, CVA, chronic left DVT on Eliquis, previous compartment syndrome, S/P fasciotomy in Feb 2023, HLD, PFO, MIMI who was sent in by his outpatient rheumatologist Dr. Muir for a newly discovered Right LE DVT on outpatient US 05/04 in the setting of elevated cryoglobulins. Patient has been adherent to his Eliquis regimen. Fasciotomy scars are healing well with some serosanguinous discharge. Patient admits to globulus sensation with PO intake, Patient denies any leg pain, swelling, chest pain, shortness of breath, fevers, chills.     Deep venous thrombosis  - Pt with chronic LLE DVT, found to have RLE DVT while on ASA and Eliquis for AP/AC.   - Reported to have + cryoglobulin on outpatient assay with workup reportedly nondiagnostic per patient  - Pt follows with [facility], patient reports previous hypercoag work up negative per patient. [facility], Heme/Onc consulted; F/u recs   - Hold Eliquis. ? Eliquis failure --> On heparin gtt with plans to transition to Lovenox 1.5 dose (150 Mg Q 12 hours for AC)  - Rheum eval appreciated; F/u recs --> Repeat Cryoglobulinemia work up underway   - F/u MR Angio as per Rheum     Globus sensation.  - CT Head without significant change from 01/18; Microvascular ischemic changes in the periventricular and subcortical white matter   - Speech and swallow eval   - Started on Pureed diet pending S+S recs  - Aspiration precautions  - IVF for hydration     Cerebrovascular disease.  - H/O prior CVA --> On ASA and Statin   - F/u MR Angio as per Rheum   - Neuro eval consulted; F/u recs   - CTH stable     H/O Seizure  - C/w Keppra and Vimpat  - neuro follow up     History of fasciotomy.  - Dressings per Vacular/ Wound care   - Wound care eval   - Vascular surgery eval       PPX  - Hep gtt, transition to Lovenox 150 mg BID from 05/06  Patient is a 52 year old Male with a PMHx of TIA, CVA, chronic left DVT on Eliquis, previous compartment syndrome, S/P fasciotomy in Feb 2023, HLD, PFO, MIMI who was sent in by his outpatient rheumatologist Dr. Muir for a newly discovered Right LE DVT on outpatient US 05/04 in the setting of elevated cryoglobulins. Patient has been adherent to his Eliquis regimen. Fasciotomy scars are healing well with some serosanguinous discharge. Patient admits to globulus sensation with PO intake, Patient denies any leg pain, swelling, chest pain, shortness of breath, fevers, chills.     Deep venous thrombosis  - Pt with chronic LLE DVT, found to have RLE DVT while on ASA and Eliquis for AP/AC.   - Reported to have + cryoglobulin on outpatient assay with workup reportedly nondiagnostic per patient  - Pt follows with [facility], patient reports previous hypercoag work up negative per patient. [facility], Heme/Onc consulted; F/u recs   - Hold Eliquis. ? Eliquis failure --> On heparin gtt with plans to transition to Lovenox 1.5 dose (150 Mg Qd AC)  - Rheum eval appreciated; F/u recs --> Repeat Cryoglobulinemia work up underway   - F/u MR Angio as per Rheum     Globus sensation.  - CT Head without significant change from 01/18; Microvascular ischemic changes in the periventricular and subcortical white matter   - Speech and swallow eval   - Started on Pureed diet pending S+S recs  - Aspiration precautions  - IVF for hydration     Cerebrovascular disease.  - H/O prior CVA --> On ASA and Statin   - F/u MR Angio as per Rheum   - Neuro eval consulted; F/u recs   - CTH stable     H/O Seizure  - C/w Keppra and Vimpat  - neuro follow up     History of fasciotomy.  - Dressings per Vacular/ Wound care   - Wound care eval   - Vascular surgery eval       PPX  - Hep gtt, transition to Lovenox 150 Mg Qd from 05/06

## 2023-05-05 NOTE — CHART NOTE - NSCHARTNOTEFT_GEN_A_CORE
case d/w primary inpatient rheum (Tyler)  family asked for me (outpatient rheum) to update on case   I spoke to patients emergency contact Winter and updated her on the current rheum plan

## 2023-05-05 NOTE — CONSULT NOTE ADULT - SUBJECTIVE AND OBJECTIVE BOX
Wound SURGERY CONSULT NOTE    HPI:  NIGHT HOSPITALIST:    Patient UNKNOWN to me previously, assigned to me at this point via the ER and by Dr. 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 Muir from 4/25/23--patient with a history of multiple TIA/ CVA apparently dating to 2011, 2013 (unclear to mechanism), with no interval episodes until Feb 2022, Aug 2022 and Nov 2022 with residual expressive aphasia maintained on apixaban, hyperlipidemia and apparently questionable ASD/ PFO with an admission to [facility] in Dec 2022 with patient noted unresponsive at home with patient noted to have blood in the mouth with scattered ecchymoses in the arms, face, chest with fever at the time, elevated BP and patient intubated for airway protection at the time with empiric antibiotics for presumed aspiration syndrome, with CTT head nondiagnostic other than old Ca++ mid alexis, EEG nondiagnostic, echo at the time with NO PFO, with an event on 12/5/22 with a Rapid Response call with tongue laceration and posturing concern for status epilepticus w/ patient started on Keppra, MIMI with small PFO noted.    EEG with seizures captured.  LP for fever negative for paraneoplastic encephalitis.   Patient was extubated on 12/8/22.  MRI brain with B/L lentiform nucleus and pontine enhancement, but with NS did not recommend tissue sampling.  Patient was readmitted to [facility] on 1/18/23 following delirium at the rehab with patient's parents noting patient to have confusion and word searching difficulty, patient underwent an emergent LEFT LE fasciotomy following presentation with compartment syndrome, chronic LEFT foot drop, with short course of IV Zosyn and avoidance of LEFT BKA,       Patient relates decade long history of generalized undifferentiated fatigue since his college years, with apparently a previous neurologist prescribing Adderall.   Patient denies acute LEFT leg pain with prior fasciotomy.  NO HA, no new focal weakness.  NO fever, no chills, no rigors.  NO chest pain/pressure.  NO dyspnoea.      Patient does report intermittent globus sensation with food 2 days ago.   Denies coughing with solids/liquids.  NO weight loss or anorexia. (04 May 2023 17:28)        N/V/D,  BM/ Flatus,   NGT,     palp/ sob/dyspnea/ cp,       F/C/S  Wound consult requested by team to assist w/ management of      wound/ pressure injury.   Pt (unable to)  c/o pain, drainage, odor, color change,  or worsening swelling. Offloading and pericare initiated upon admission as pt Increasingly sedentary 2/2 to illness. Pt is Incontinent of urine & stool. (+)fish/ ostomy.   No h/o bites, scratches, falls, trauma.  Pt seen by Wound RN  CAVILON Advance/  Raysa,TRIAD/ Lucilaell/ medihoney/ Allevyn foam/ dakins/ Adaptic/ DSD recommended used at home/ while awaiting consult.  Appetite good/ decreased.  weight loss.  S&S / RD consult appreciated All questions asked and answered to pt's and family's expressed understanding and satisfaction.    Current Diet: Diet, NPO:   Except Medications  With Ice Chips/Sips of Water (05-04-23 @ 17:41)      PAST MEDICAL & SURGICAL HISTORY:  History of TIAs      CVA (cerebrovascular accident)      HLD (hyperlipidemia)      Vertigo      Unresponsiveness      History of fasciotomy          REVIEW OF SYSTEMS: Pt unable to offer  General/ Breast/ Skin/Vasc/ Neuro/ MSK: see HPI  All other systems negative    MEDICATIONS  (STANDING):  aspirin enteric coated 81 milliGRAM(s) Oral daily  atorvastatin 40 milliGRAM(s) Oral at bedtime  dextrose 5% + sodium chloride 0.9%. 1000 milliLiter(s) (60 mL/Hr) IV Continuous <Continuous>  dextrose 5%. 1000 milliLiter(s) (50 mL/Hr) IV Continuous <Continuous>  dextrose 5%. 1000 milliLiter(s) (100 mL/Hr) IV Continuous <Continuous>  dextrose 50% Injectable 12.5 Gram(s) IV Push once  dextrose 50% Injectable 25 Gram(s) IV Push once  dextrose 50% Injectable 25 Gram(s) IV Push once  gabapentin 300 milliGRAM(s) Oral three times a day  glucagon  Injectable 1 milliGRAM(s) IntraMuscular once  heparin  Infusion. 1500 Unit(s)/Hr (15 mL/Hr) IV Continuous <Continuous>  lacosamide 100 milliGRAM(s) Oral two times a day  levETIRAcetam 1500 milliGRAM(s) Oral <User Schedule>  multivitamin 1 Tablet(s) Oral daily  pantoprazole    Tablet 40 milliGRAM(s) Oral before breakfast    MEDICATIONS  (PRN):  acetaminophen     Tablet .. 650 milliGRAM(s) Oral every 6 hours PRN Temp greater or equal to 38C (100.4F), Mild Pain (1 - 3)  dextrose Oral Gel 15 Gram(s) Oral once PRN Blood Glucose LESS THAN 70 milliGRAM(s)/deciliter  heparin   Injectable 4000 Unit(s) IV Push every 6 hours PRN For aPTT between 40 - 57  heparin   Injectable 8500 Unit(s) IV Push every 6 hours PRN For aPTT less than 40  oxyCODONE    IR 5 milliGRAM(s) Oral every 6 hours PRN Moderate Pain (4 - 6)      Allergies    No Known Allergies    Intolerances        SOCIAL HISTORY:  / /single/ ; (+)HHA/ lives in SNF; Former smoker, No current/ Denies smoking, ETOH, drugs    FAMILY HISTORY:  No pertinent family history in first degree relatives     no h/o PVD or wound healing or skin/ significant problems    PHYSICAL EXAM:  Vital Signs Last 24 Hrs  T(C): 36.6 (05 May 2023 05:01), Max: 36.7 (04 May 2023 17:46)  T(F): 97.8 (05 May 2023 05:01), Max: 98.1 (04 May 2023 17:46)  HR: 69 (05 May 2023 05:01) (64 - 72)  BP: 119/72 (05 May 2023 05:01) (116/73 - 146/92)  BP(mean): 87 (04 May 2023 17:46) (87 - 87)  RR: 18 (05 May 2023 05:01) (17 - 20)  SpO2: 97% (05 May 2023 05:01) (94% - 97%)    Parameters below as of 05 May 2023 05:01  Patient On (Oxygen Delivery Method): room air        NAD, Guarded but stable,  A&Ox3/ Alert/ Confused  cachectic/ thin, MO/ Obese, frail,  WD/ WN/ WG,  Disheveled  Total Care Sport/ Versa Care P500 / Envella Progressa bed     HEENT:  NC/AT, PERRL, EOMI, sclera clear, mucosa moist, throat clear, trachea midline, neck supple, trach  Respiratory: nonlabored w/ equal chest rise  Gastrointestinal: soft NT/ND (+)BS  (+)PEG (+)ostomy (+)NGT  : (+)fish/ purewick/ condom cath  Neurology:  weakened strength & sensation grossly intact, paraesthesia  nonverbal, no follow commands, paraplegic  Psych: calm/ appropriate/ flat affect/ easily agitated/ restless/ anxious/ difficult to assess  Musculoskeletal:  limited stiff / p/FROM, no deformities/ contractures  Vascular: BLE equally warm/ cool,  no cyanosis, clubbing, edema nor acute ischemia           >LE //BLE edema equal           BLE DP/PT pulses palpable          BLE hemosiderin staining/ varicose veins  Skin:  moist w/ good turgor  thin, dry, pale, frail,  ecchymosis w/o hematoma  blistering  or serosanguinous drainage  No odor, erythema, increased warmth, tenderness, induration, fluctuance, nor crepitus    LABS/ CULTURES/ RADIOLOGY:                        12.4   6.01  )-----------( 190      ( 05 May 2023 09:04 )             36.7       141  |  107  |  14  ----------------------------<  97      [05-05-23 @ 06:51]  3.8   |  25  |  0.86        Ca     8.7     [05-05-23 @ 06:51]    TPro  5.7  /  Alb  x   /  TBili  x   /  DBili  x   /  AST  x   /  ALT  x   /  AlkPhos  x   [05-05-23 @ 06:51]    PT/INR: PT 12.1 , INR 1.05       [05-05-23 @ 06:50]  PTT: 101.0      [05-05-23 @ 09:04]                              A/P:    Wound Consult requested to assist w/ management of    BLE elevation & Compression  Consider JORDANA/PVR, Duplex, Xray, A/P BLE CT or MRI  Abx per Medicine/ ID  Moisturize intact skin w/ SWEEN cream BID  Nutrition Consult for optimization in pt w/ Severe Protein Calorie Malnutrition,        Inadequate PO intake, & Increased nutritional needs            encourage high quality protein, honey/ prosource, MVI & Vit C to promote wound healing  Hyperglycemia - improving w/ ADA diet and Lantus/ NPH & FS w/ ISS, consider Endo Consult, consider HgA1c  Anemia- improving w/ transfusions, Fe studies, protonix  Continue turning and positioning w/ offloading assistive devices as per protocol  Buttocks/ Sacrum Raysa/ TRIAD BID /CAVILON ADVANCE TIW and prn soiling        Continue w/ attends under pads and Pericare w/ fish/ condom cath maintenance / purewick care as per protocol  Waffle Cushion to chair when oob to chair  Continue w/ low air loss pressure redistribution bed surface   Pt will need Group 2 mattress on hospital bed and ROHO cushion for wheel chair upon discharge home  Care as per medicine, will follow w/ you/ remain available as requested  Upon discharge f/u as outpatient at Wound Center [address] [place] [address] [phone]  Seen w/ attng & RN and D/w team & RN  Thank you for this consult  Leticia Wills PA-C CWS [id number]  Nights/ Weekends/ Holidays please call:  General Surgery Consult pager ([phone]) for emergencies  Wound PT for multilayer leg wrapping or VAC issues (x [phone])      Wound SURGERY CONSULT NOTE    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  patient with a history of multiple TIA/ CVA apparently dating to 2011, 2013 (unclear to mechanism), with no interval episodes until Feb 2022, Aug 2022 and Nov 2022 with residual expressive aphasia maintained on apixaban, hyperlipidemia and apparently questionable ASD/ PFO with an admission to [facility] in Dec 2022 with patient noted unresponsive at home with patient noted to have blood in the mouth with scattered ecchymoses in the arms, face, chest with fever at the time, elevated BP and patient intubated for airway protection at the time with empiric antibiotics for presumed aspiration syndrome, with CTT head nondiagnostic other than old Ca++ mid alexis, EEG nondiagnostic, echo at the time with NO PFO, with an event on 12/5/22 with a Rapid Response call with tongue laceration and posturing concern for status epilepticus w/ patient started on Keppra, MIMI with small PFO noted.    EEG with seizures captured.  LP for fever negative for paraneoplastic encephalitis.   Patient was extubated on 12/8/22.  MRI brain with B/L lentiform nucleus and pontine enhancement, but with NS did not recommend tissue sampling.  Patient was readmitted to [facility] on 1/18/23 following delirium at the rehab with patient's parents noting patient to have confusion and word searching difficulty, patient underwent an emergent LEFT LE fasciotomy following presentation with compartment syndrome, chronic LEFT foot drop, with short course of IV Zosyn and avoidance of LEFT BKA,       Patient relates decade long history of generalized undifferentiated fatigue since his college years, with apparently a previous neurologist prescribing Adderall.   Patient denies acute LEFT leg pain with prior fasciotomy.  NO HA, no new focal weakness.  NO fever, no chills, no rigors.  NO chest pain/pressure.  NO dyspnoea.      Patient does report intermittent globus sensation with food 2 days ago.   Denies coughing with solids/liquids.  NO weight loss or anorexia. (04     Wound consult requested by team to assist w/ management of LLE  wound s/p fasciotomy for compartment syndrome.   Pt w/o c/o pain, excess drainage, odor, color change,  or worsening swelling.  Pt tolerating dressings.  Pt well known to wound center attng.  Evaluated pt for skin substitute, but wound improving.  Offloading and pericare initiated upon admission as pt sedentary 2/2 to illness. No other h/o bites, scratches, falls, trauma.  Appetite good w/o weight loss.  All questions asked and answered to pt's expressed understanding and satisfaction.    Current Diet: Diet, NPO:   Except Medications  With Ice Chips/Sips of Water (05-04-23 @ 17:41)      PAST MEDICAL & SURGICAL HISTORY:  TIAs    CVA (cerebrovascular accident)    HLD (hyperlipidemia)    Vertigo    LLE Compartment Syndrome s/p fasciotomy    PFO/ ASD      REVIEW OF SYSTEMS: General/ Skin/Vasc/ Neuro/ MSK: see HPI  All other systems negative    MEDICATIONS  (STANDING):  aspirin enteric coated 81 milliGRAM(s) Oral daily  atorvastatin 40 milliGRAM(s) Oral at bedtime  dextrose 5% + sodium chloride 0.9%. 1000 milliLiter(s) (60 mL/Hr) IV Continuous <Continuous>  dextrose 5%. 1000 milliLiter(s) (50 mL/Hr) IV Continuous <Continuous>  dextrose 5%. 1000 milliLiter(s) (100 mL/Hr) IV Continuous <Continuous>  dextrose 50% Injectable 12.5 Gram(s) IV Push once  dextrose 50% Injectable 25 Gram(s) IV Push once  dextrose 50% Injectable 25 Gram(s) IV Push once  gabapentin 300 milliGRAM(s) Oral three times a day  glucagon  Injectable 1 milliGRAM(s) IntraMuscular once  heparin  Infusion. 1500 Unit(s)/Hr (15 mL/Hr) IV Continuous <Continuous>  lacosamide 100 milliGRAM(s) Oral two times a day  levETIRAcetam 1500 milliGRAM(s) Oral <User Schedule>  multivitamin 1 Tablet(s) Oral daily  pantoprazole    Tablet 40 milliGRAM(s) Oral before breakfast    MEDICATIONS  (PRN):  acetaminophen     Tablet .. 650 milliGRAM(s) Oral every 6 hours PRN Temp greater or equal to 38C (100.4F), Mild Pain (1 - 3)  dextrose Oral Gel 15 Gram(s) Oral once PRN Blood Glucose LESS THAN 70 milliGRAM(s)/deciliter  heparin   Injectable 4000 Unit(s) IV Push every 6 hours PRN For aPTT between 40 - 57  heparin   Injectable 8500 Unit(s) IV Push every 6 hours PRN For aPTT less than 40  oxyCODONE    IR 5 milliGRAM(s) Oral every 6 hours PRN Moderate Pain (4 - 6)      No Known Allergies    SOCIAL HISTORY:  single/lives in SNF;  Denies smoking, ETOH, drugs    FAMILY HISTORY:No pertinent family history in first degree relatives    PHYSICAL EXAM:  Vital Signs Last 24 Hrs  T(C): 36.6 (05 May 2023 05:01), Max: 36.7 (04 May 2023 17:46)  T(F): 97.8 (05 May 2023 05:01), Max: 98.1 (04 May 2023 17:46)  HR: 69 (05 May 2023 05:01) (64 - 72)  BP: 119/72 (05 May 2023 05:01) (116/73 - 146/92)  BP(mean): 87 (04 May 2023 17:46) (87 - 87)  RR: 18 (05 May 2023 05:01) (17 - 20)  SpO2: 97% (05 May 2023 05:01) (94% - 97%)    Parameters below as of 05 May 2023 05:01  Patient On (Oxygen Delivery Method): room air      NAD,,  A&Ox3, WD/ WN/ WG  Versa Care P500 bed     HEENT:  NC/AT, PERRL, EOMI, sclera clear, mucosa moist, throat clear, trachea midline, neck supple, trach  Respiratory: nonlabored w/ equal chest rise  Gastrointestinal: soft NT/ND (+)BS  (+)PEG (+)ostomy (+)NGT  : (+)fish/ purewick/ condom cath  Neurology:  weakened strength & sensation grossly intact, paraesthesia  nonverbal, no follow commands, paraplegic  Psych: calm/ appropriate/ flat affect/ easily agitated/ restless/ anxious/ difficult to assess  Musculoskeletal:  limited stiff / p/FROM, no deformities/ contractures  Vascular: BLE equally warm/ cool,  no cyanosis, clubbing, edema nor acute ischemia           >LE //BLE edema equal           BLE DP/PT pulses palpable          BLE hemosiderin staining/ varicose veins  Skin:  moist w/ good turgor  thin, dry, pale, frail,  ecchymosis w/o hematoma  blistering  or serosanguinous drainage  No odor, erythema, increased warmth, tenderness, induration, fluctuance, nor crepitus    LABS/ CULTURES/ RADIOLOGY:                        12.4   6.01  )-----------( 190      ( 05 May 2023 09:04 )             36.7       141  |  107  |  14  ----------------------------<  97      [05-05-23 @ 06:51]  3.8   |  25  |  0.86        Ca     8.7     [05-05-23 @ 06:51]    TPro  5.7  /  Alb  x   /  TBili  x   /  DBili  x   /  AST  x   /  ALT  x   /  AlkPhos  x   [05-05-23 @ 06:51]    PT/INR: PT 12.1 , INR 1.05       [05-05-23 @ 06:50]  PTT: 101.0      [05-05-23 @ 09:04]                              A/P:    Wound Consult requested to assist w/ management of    BLE elevation & Compression  Consider JORDANA/PVR, Duplex, Xray, A/P BLE CT or MRI  Abx per Medicine/ ID  Moisturize intact skin w/ SWEEN cream BID  Nutrition Consult for optimization in pt w/ Severe Protein Calorie Malnutrition,        Inadequate PO intake, & Increased nutritional needs            encourage high quality protein, honey/ prosource, MVI & Vit C to promote wound healing  Hyperglycemia - improving w/ ADA diet and Lantus/ NPH & FS w/ ISS, consider Endo Consult, consider HgA1c  Anemia- improving w/ transfusions, Fe studies, protonix  Continue turning and positioning w/ offloading assistive devices as per protocol  Buttocks/ Sacrum Raysa/ TRIAD BID /CAVILON ADVANCE TIW and prn soiling        Continue w/ attends under pads and Pericare w/ fish/ condom cath maintenance / purewick care as per protocol  Waffle Cushion to chair when oob to chair  Continue w/ low air loss pressure redistribution bed surface   Pt will need Group 2 mattress on hospital bed and ROHO cushion for wheel chair upon discharge home  Care as per medicine, will follow w/ you/ remain available as requested  Upon discharge f/u as outpatient at Wound Center [address] [facility] [phone]  Seen w/ attng & RN and D/w team & RN  Thank you for this consult  Leticia Wills PA-C CWS [id number]  Nights/ Weekends/ Holidays please call:  General Surgery Consult pager ([phone]) for emergencies  Wound PT for multilayer leg wrapping or VAC issues (x [phone])      Wound SURGERY CONSULT NOTE    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  patient with a history of multiple TIA/ CVA apparently dating to 2011, 2013 (unclear to mechanism), with no interval episodes until Feb 2022, Aug 2022 and Nov 2022 with residual expressive aphasia maintained on apixaban, hyperlipidemia and apparently questionable ASD/ PFO with an admission to [facility] in Dec 2022 with patient noted unresponsive at home with patient noted to have blood in the mouth with scattered ecchymoses in the arms, face, chest with fever at the time, elevated BP and patient intubated for airway protection at the time with empiric antibiotics for presumed aspiration syndrome, with CTT head nondiagnostic other than old Ca++ mid alexis, EEG nondiagnostic, echo at the time with NO PFO, with an event on 12/5/22 with a Rapid Response call with tongue laceration and posturing concern for status epilepticus w/ patient started on Keppra, MIMI with small PFO noted.    EEG with seizures captured.  LP for fever negative for paraneoplastic encephalitis.   Patient was extubated on 12/8/22.  MRI brain with B/L lentiform nucleus and pontine enhancement, but with NS did not recommend tissue sampling.  Patient was readmitted to [facility] on 1/18/23 following delirium at the rehab with patient's parents noting patient to have confusion and word searching difficulty, patient underwent an emergent LEFT LE fasciotomy following presentation with compartment syndrome, chronic LEFT foot drop, with short course of IV Zosyn and avoidance of LEFT BKA,       Patient relates decade long history of generalized undifferentiated fatigue since his college years, with apparently a previous neurologist prescribing Adderall.   Patient denies acute LEFT leg pain with prior fasciotomy.  NO HA, no new focal weakness.  NO fever, no chills, no rigors.  NO chest pain/pressure.  NO dyspnoea.      Patient does report intermittent globus sensation with food 2 days ago.   Denies coughing with solids/liquids.  NO weight loss or anorexia. (04     Wound consult requested by team to assist w/ management of LLE  wound s/p fasciotomy for compartment syndrome.   Pt w/o c/o pain, excess drainage, odor, color change,  or worsening swelling.  Pt tolerating dressings.  Pt well known to wound center attng.  Evaluated pt for skin substitute, but wound improving.  Offloading and pericare initiated upon admission as pt sedentary 2/2 to illness. No other h/o bites, scratches, falls, trauma.  Appetite good w/o weight loss.  All questions asked and answered to pt's expressed understanding and satisfaction.    Current Diet: Diet, NPO:   Except Medications  With Ice Chips/Sips of Water (05-04-23 @ 17:41)      PAST MEDICAL & SURGICAL HISTORY:  TIAs    CVA (cerebrovascular accident)    HLD (hyperlipidemia)    Vertigo    LLE Compartment Syndrome s/p fasciotomy    PFO/ ASD      REVIEW OF SYSTEMS: General/ Skin/Vasc/ Neuro/ MSK: see HPI  All other systems negative    MEDICATIONS  (STANDING):  aspirin enteric coated 81 milliGRAM(s) Oral daily  atorvastatin 40 milliGRAM(s) Oral at bedtime  dextrose 5% + sodium chloride 0.9%. 1000 milliLiter(s) (60 mL/Hr) IV Continuous <Continuous>  dextrose 5%. 1000 milliLiter(s) (50 mL/Hr) IV Continuous <Continuous>  dextrose 5%. 1000 milliLiter(s) (100 mL/Hr) IV Continuous <Continuous>  dextrose 50% Injectable 12.5 Gram(s) IV Push once  dextrose 50% Injectable 25 Gram(s) IV Push once  dextrose 50% Injectable 25 Gram(s) IV Push once  gabapentin 300 milliGRAM(s) Oral three times a day  glucagon  Injectable 1 milliGRAM(s) IntraMuscular once  heparin  Infusion. 1500 Unit(s)/Hr (15 mL/Hr) IV Continuous <Continuous>  lacosamide 100 milliGRAM(s) Oral two times a day  levETIRAcetam 1500 milliGRAM(s) Oral <User Schedule>  multivitamin 1 Tablet(s) Oral daily  pantoprazole    Tablet 40 milliGRAM(s) Oral before breakfast    MEDICATIONS  (PRN):  acetaminophen     Tablet .. 650 milliGRAM(s) Oral every 6 hours PRN Temp greater or equal to 38C (100.4F), Mild Pain (1 - 3)  dextrose Oral Gel 15 Gram(s) Oral once PRN Blood Glucose LESS THAN 70 milliGRAM(s)/deciliter  heparin   Injectable 4000 Unit(s) IV Push every 6 hours PRN For aPTT between 40 - 57  heparin   Injectable 8500 Unit(s) IV Push every 6 hours PRN For aPTT less than 40  oxyCODONE    IR 5 milliGRAM(s) Oral every 6 hours PRN Moderate Pain (4 - 6)      No Known Allergies    SOCIAL HISTORY:  single/lives in SNF;  Denies smoking, ETOH, drugs    FAMILY HISTORY:No pertinent family history in first degree relatives    PHYSICAL EXAM:  Vital Signs Last 24 Hrs  T(C): 36.6 (05 May 2023 05:01), Max: 36.7 (04 May 2023 17:46)  T(F): 97.8 (05 May 2023 05:01), Max: 98.1 (04 May 2023 17:46)  HR: 69 (05 May 2023 05:01) (64 - 72)  BP: 119/72 (05 May 2023 05:01) (116/73 - 146/92)  BP(mean): 87 (04 May 2023 17:46) (87 - 87)  RR: 18 (05 May 2023 05:01) (17 - 20)  SpO2: 97% (05 May 2023 05:01) (94% - 97%)    Parameters below as of 05 May 2023 05:01  Patient On (Oxygen Delivery Method): room air      NAD,,  A&Ox3, WD/ WN/ WG  Versa Care P500 bed     HEENT:  NC/AT, EOMI, sclera clear, mucosa moist, throat clear, trachea midline, neck supple  Respiratory: nonlabored w/ equal chest rise  Gastrointestinal: soft NT/ND   Neurology:  weakened strength & sensation grossly intact (+) foot drop  Psych: calm/ appropriate  Musculoskeletal: FROM, no deformities/ contractures  Vascular: BLE equally warm,  no cyanosis, clubbing, nor acute ischemia          L >RLE mild edema, calves soft & nontender           BLE DP pulses palpable          LLE lateral incision 17cm x 1cm x 0.1cm          LLE medial incision 5cm x 1cm x 0.1cm          moist granular tissue          dried serous crusting mechanically debrided  erosanguinous drainage  No odor, erythema, increased warmth, tenderness, induration, fluctuance, nor crepitus  Skin:  moist w/ good turgor      LABS/ CULTURES/ RADIOLOGY:                        12.4   6.01  )-----------( 190      ( 05 May 2023 09:04 )             36.7       141  |  107  |  14  ----------------------------<  97      [05-05-23 @ 06:51]  3.8   |  25  |  0.86        Ca     8.7     [05-05-23 @ 06:51]    TPro  5.7  /  Alb  x   /  TBili  x   /  DBili  x   /  AST  x   /  ALT  x   /  AlkPhos  x   [05-05-23 @ 06:51]    PT/INR: PT 12.1 , INR 1.05       [05-05-23 @ 06:50]  PTT: 101.0      [05-05-23 @ 09:04]

## 2023-05-05 NOTE — SWALLOW BEDSIDE ASSESSMENT ADULT - SWALLOW EVAL: PROGNOSIS
fair
Jarek Perez, Magdalena Lei, Noe Ly, Elayne Whitehead, Ventura NOBLE, Kelby Solitario, Ruben Pérez, [place]

## 2023-05-05 NOTE — CONSULT NOTE ADULT - SUBJECTIVE AND OBJECTIVE BOX
Reason for consult:    HPI:  NIGHT HOSPITALIST:    Patient UNKNOWN to me previously, assigned to me at this point via the ER and by Dr. 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 Muir from 4/25/23--patient with a history of multiple TIA/ CVA apparently dating to 2011, 2013 (unclear to mechanism), with no interval episodes until Feb 2022, Aug 2022 and Nov 2022 with residual expressive aphasia maintained on apixaban, hyperlipidemia and apparently questionable ASD/ PFO with an admission to [facility] in Dec 2022 with patient noted unresponsive at home with patient noted to have blood in the mouth with scattered ecchymoses in the arms, face, chest with fever at the time, elevated BP and patient intubated for airway protection at the time with empiric antibiotics for presumed aspiration syndrome, with CTT head nondiagnostic other than old Ca++ mid alexis, EEG nondiagnostic, echo at the time with NO PFO, with an event on 12/5/22 with a Rapid Response call with tongue laceration and posturing concern for status epilepticus w/ patient started on Keppra, MIMI with small PFO noted.    EEG with seizures captured.  LP for fever negative for paraneoplastic encephalitis.   Patient was extubated on 12/8/22.  MRI brain with B/L lentiform nucleus and pontine enhancement, but with NS did not recommend tissue sampling.  Patient was readmitted to [facility] on 1/18/23 following delirium at the rehab with patient's parents noting patient to have confusion and word searching difficulty, patient underwent an emergent LEFT LE fasciotomy following presentation with compartment syndrome, chronic LEFT foot drop, with short course of IV Zosyn and avoidance of LEFT BKA,       Patient relates decade long history of generalized undifferentiated fatigue since his college years, with apparently a previous neurologist prescribing Adderall.   Patient denies acute LEFT leg pain with prior fasciotomy.  NO HA, no new focal weakness.  NO fever, no chills, no rigors.  NO chest pain/pressure.  NO dyspnoea.      Patient does report intermittent globus sensation with food 2 days ago.   Denies coughing with solids/liquids.  NO weight loss or anorexia. (04 May 2023 17:28)    Hematology/Oncology called to see patient who follows with Dr Varela of [facility] for hypercoagulable work up and AC    PAST MEDICAL & SURGICAL HISTORY:  History of TIAs      CVA (cerebrovascular accident)      HLD (hyperlipidemia)      Vertigo      Unresponsiveness      History of fasciotomy          FAMILY HISTORY:  No pertinent family history in first degree relatives        Alochol: Denied  Smoking: Nonsmoker  Drug Use: Denied  Marital Status:         Allergies    No Known Allergies    Intolerances        MEDICATIONS  (STANDING):  aspirin enteric coated 81 milliGRAM(s) Oral daily  atorvastatin 40 milliGRAM(s) Oral at bedtime  dextrose 5% + sodium chloride 0.9%. 1000 milliLiter(s) (60 mL/Hr) IV Continuous <Continuous>  dextrose 5%. 1000 milliLiter(s) (50 mL/Hr) IV Continuous <Continuous>  dextrose 5%. 1000 milliLiter(s) (100 mL/Hr) IV Continuous <Continuous>  dextrose 50% Injectable 25 Gram(s) IV Push once  dextrose 50% Injectable 12.5 Gram(s) IV Push once  dextrose 50% Injectable 25 Gram(s) IV Push once  gabapentin 300 milliGRAM(s) Oral three times a day  glucagon  Injectable 1 milliGRAM(s) IntraMuscular once  heparin  Infusion. 1500 Unit(s)/Hr (15 mL/Hr) IV Continuous <Continuous>  lacosamide 100 milliGRAM(s) Oral two times a day  levETIRAcetam 1500 milliGRAM(s) Oral <User Schedule>  multivitamin 1 Tablet(s) Oral daily  pantoprazole    Tablet 40 milliGRAM(s) Oral before breakfast    MEDICATIONS  (PRN):  acetaminophen     Tablet .. 650 milliGRAM(s) Oral every 6 hours PRN Temp greater or equal to 38C (100.4F), Mild Pain (1 - 3)  dextrose Oral Gel 15 Gram(s) Oral once PRN Blood Glucose LESS THAN 70 milliGRAM(s)/deciliter  heparin   Injectable 8500 Unit(s) IV Push every 6 hours PRN For aPTT less than 40  heparin   Injectable 4000 Unit(s) IV Push every 6 hours PRN For aPTT between 40 - 57  oxyCODONE    IR 5 milliGRAM(s) Oral every 6 hours PRN Moderate Pain (4 - 6)      ROS  No fever, sweats, chills  No epistaxis, HA, sore throat  No CP, SOB, cough, sputum  No n/v/d, abd pain, melena, hematochezia  No edema  No rash  No anxiety  No back pain, joint pain  No bleeding, bruising  No dysuria, hematuria    T(C): 36.4 (05-05-23 @ 16:29), Max: 36.6 (05-05-23 @ 05:01)  HR: 67 (05-05-23 @ 16:29) (61 - 72)  BP: 127/82 (05-05-23 @ 16:29) (117/80 - 146/92)  RR: 18 (05-05-23 @ 16:29) (17 - 18)  SpO2: 99% (05-05-23 @ 16:29) (94% - 99%)  Wt(kg): --    PE  NAD  Awake, alert  Anicteric, MMM  RRR  CTAB  Abd soft, NT, ND  No c/c/e  No rash grossly  FROM                          12.4   6.01  )-----------( 190      ( 05 May 2023 09:04 )             36.7       05-05    141  |  107  |  14  ----------------------------<  97  3.8   |  25  |  0.86    Ca    8.7      05 May 2023 06:51    TPro  5.7<L>  /  Alb  x   /  TBili  x   /  DBili  x   /  AST  x   /  ALT  x   /  AlkPhos  x   05-05

## 2023-05-05 NOTE — CONSULT NOTE ADULT - REASON FOR ADMISSION
Sent to the ER for NEW RIGHT LE DVT on apixaban, globus sensation with food 2 days ago.

## 2023-05-05 NOTE — CONSULT NOTE ADULT - NS ATTEND AMEND GEN_ALL_CORE FT
I spent 55 minutes face to face w/ this pt of which more than 50% of the time was spent counseling & coordinating care of this pt.  agree with note above.
New distal DVT in his right lower extremity on eliquis.  Recommend to begin lovenox, 1.5mg/kg once a day.  Can consider lovenox or bridge to coumadin as an outpatient.  Prior hypercoagulable work up was negative.

## 2023-05-05 NOTE — CONSULT NOTE ADULT - ASSESSMENT
A/P:  52 year old Male with a PMHx of TIA, CVA, chronic left DVT on Eliquis, previous compartment syndrome, S/P fasciotomy in Feb 2023, HLD, PFO, MIMI who was sent in by his outpatient rheumatologist Dr. Muir for a newly discovered Right LE DVT on outpatient US 05/04 in the setting of elevated cryoglobulins. Patient has been adherent to his Eliquis regimen. Fasciotomy scars are healing well with some serosanguinous discharge. Patient admits to globulus sensation with PO intake, Patient denies any leg pain, swelling, chest pain, shortness of breath, fevers, chills.     Wound Consult requested to assist w/ management of LLE wounds    LLE wounds- Aquacel dressing QD  BLE elevation & Compression  Moisturize intact skin w/ SWEEN cream BID  Nutrition Consult for optimization         encourage high quality protein, MVI & Vit C to promote wound healing  Continue turning and positioning w/ offloading assistive devices as per protocol  Buttocks/ Sacrum  Continue w/ attends under pads and Pericare as per protocol  Waffle Cushion to chair when oob to chair  Continue w/ low air loss pressure redistribution bed surface   Care as per medicine, will follow w/ you  Upon discharge f/u as outpatient at [facility] [address] [place] [address] [phone]  Seen w/ attng and D/w team & RN  Thank you for this consult  Leticia Wills PA-C CWS [id number]  Nights/ Weekends/ Holidays please call:  General Surgery Consult pager ([phone]) for emergencies  Wound PT for multilayer leg wrapping or VAC issues ([phone])   I spent 55 minutes face to face w/ this pt of which more than 50% of the time was spent counseling & coordinating care of this pt.  A/P:  52 year old Male with a PMHx of TIA, CVA, chronic left DVT on Eliquis, previous compartment syndrome, S/P fasciotomy in Feb 2023, HLD, PFO, MIMI who was sent in by his outpatient rheumatologist Dr. Muir for a newly discovered Right LE DVT on outpatient US 05/04 in the setting of elevated cryoglobulins. Patient has been adherent to his Eliquis regimen. Fasciotomy scars are healing well with some serosanguinous discharge. Patient admits to globulus sensation with PO intake, Patient denies any leg pain, swelling, chest pain, shortness of breath, fevers, chills.     Wound Consult requested to assist w/ management of LLE wounds    LLE wounds- Aquacel dressing QD  BLE elevation & Compression  Moisturize intact skin w/ SWEEN cream BID  Nutrition Consult for optimization         encourage high quality protein, MVI & Vit C to promote wound healing  Continue turning and positioning w/ offloading assistive devices as per protocol  Buttocks/ Sacrum  Continue w/ attends under pads and Pericare as per protocol  Waffle Cushion to chair when oob to chair  Continue w/ low air loss pressure redistribution bed surface   Care as per medicine, will follow w/ you  Upon discharge f/u as outpatient at Wound Center [address] [place] [address] [phone]  Seen w/ attng and D/w team & RN  Thank you for this consult  Leticia Wills PA-C CWS [id number]  Nights/ Weekends/ Holidays please call:  General Surgery Consult pager ([phone]) for emergencies  Wound PT for multilayer leg wrapping or VAC issues ([phone])

## 2023-05-06 LAB
APTT BLD: 72.3 SEC — HIGH (ref 27.5–35.5)
BASOPHILS # BLD AUTO: 0.06 K/UL — SIGNIFICANT CHANGE UP (ref 0–0.2)
BASOPHILS NFR BLD AUTO: 0.8 % — SIGNIFICANT CHANGE UP (ref 0–2)
EOSINOPHIL # BLD AUTO: 0.61 K/UL — HIGH (ref 0–0.5)
EOSINOPHIL NFR BLD AUTO: 8.4 % — HIGH (ref 0–6)
HCT VFR BLD CALC: 38.3 % — LOW (ref 39–50)
HGB BLD-MCNC: 12.5 G/DL — LOW (ref 13–17)
IMM GRANULOCYTES NFR BLD AUTO: 0.7 % — SIGNIFICANT CHANGE UP (ref 0–0.9)
INR BLD: 1.03 RATIO — SIGNIFICANT CHANGE UP (ref 0.88–1.16)
LYMPHOCYTES # BLD AUTO: 1.2 K/UL — SIGNIFICANT CHANGE UP (ref 1–3.3)
LYMPHOCYTES # BLD AUTO: 16.5 % — SIGNIFICANT CHANGE UP (ref 13–44)
MCHC RBC-ENTMCNC: 28.6 PG — SIGNIFICANT CHANGE UP (ref 27–34)
MCHC RBC-ENTMCNC: 32.6 GM/DL — SIGNIFICANT CHANGE UP (ref 32–36)
MCV RBC AUTO: 87.6 FL — SIGNIFICANT CHANGE UP (ref 80–100)
MONOCYTES # BLD AUTO: 0.81 K/UL — SIGNIFICANT CHANGE UP (ref 0–0.9)
MONOCYTES NFR BLD AUTO: 11.2 % — SIGNIFICANT CHANGE UP (ref 2–14)
NEUTROPHILS # BLD AUTO: 4.53 K/UL — SIGNIFICANT CHANGE UP (ref 1.8–7.4)
NEUTROPHILS NFR BLD AUTO: 62.4 % — SIGNIFICANT CHANGE UP (ref 43–77)
NRBC # BLD: 0 /100 WBCS — SIGNIFICANT CHANGE UP (ref 0–0)
PLATELET # BLD AUTO: 204 K/UL — SIGNIFICANT CHANGE UP (ref 150–400)
PROTHROM AB SERPL-ACNC: 11.8 SEC — SIGNIFICANT CHANGE UP (ref 10.5–13.4)
RBC # BLD: 4.37 M/UL — SIGNIFICANT CHANGE UP (ref 4.2–5.8)
RBC # FLD: 13.4 % — SIGNIFICANT CHANGE UP (ref 10.3–14.5)
WBC # BLD: 7.26 K/UL — SIGNIFICANT CHANGE UP (ref 3.8–10.5)
WBC # FLD AUTO: 7.26 K/UL — SIGNIFICANT CHANGE UP (ref 3.8–10.5)

## 2023-05-06 PROCEDURE — 70544 MR ANGIOGRAPHY HEAD W/O DYE: CPT | Mod: 26

## 2023-05-06 RX ADMIN — LACOSAMIDE 100 MILLIGRAM(S): 50 TABLET ORAL at 17:25

## 2023-05-06 RX ADMIN — GABAPENTIN 300 MILLIGRAM(S): 400 CAPSULE ORAL at 05:03

## 2023-05-06 RX ADMIN — LEVETIRACETAM 1500 MILLIGRAM(S): 250 TABLET, FILM COATED ORAL at 20:56

## 2023-05-06 RX ADMIN — GABAPENTIN 300 MILLIGRAM(S): 400 CAPSULE ORAL at 13:37

## 2023-05-06 RX ADMIN — Medication 81 MILLIGRAM(S): at 11:49

## 2023-05-06 RX ADMIN — LACOSAMIDE 100 MILLIGRAM(S): 50 TABLET ORAL at 05:03

## 2023-05-06 RX ADMIN — Medication 1 TABLET(S): at 11:49

## 2023-05-06 RX ADMIN — HEPARIN SODIUM 1300 UNIT(S)/HR: 5000 INJECTION INTRAVENOUS; SUBCUTANEOUS at 00:01

## 2023-05-06 RX ADMIN — PANTOPRAZOLE SODIUM 40 MILLIGRAM(S): 20 TABLET, DELAYED RELEASE ORAL at 05:03

## 2023-05-06 RX ADMIN — GABAPENTIN 300 MILLIGRAM(S): 400 CAPSULE ORAL at 21:02

## 2023-05-06 RX ADMIN — ATORVASTATIN CALCIUM 40 MILLIGRAM(S): 80 TABLET, FILM COATED ORAL at 21:02

## 2023-05-06 RX ADMIN — LEVETIRACETAM 1500 MILLIGRAM(S): 250 TABLET, FILM COATED ORAL at 08:51

## 2023-05-06 RX ADMIN — ENOXAPARIN SODIUM 150 MILLIGRAM(S): 100 INJECTION SUBCUTANEOUS at 08:51

## 2023-05-06 NOTE — PROGRESS NOTE ADULT - SUBJECTIVE AND OBJECTIVE BOX
Name of Patient : TAMI DUNHAM  MRN: [id number]  Date of visit: 05-06-23       Subjective: Patient seen and examined. No new events except as noted.   Doing okay       REVIEW OF SYSTEMS:    CONSTITUTIONAL: No weakness, fevers or chills  EYES/ENT: No visual changes;  No vertigo or throat pain   NECK: No pain or stiffness  RESPIRATORY: No cough, wheezing, hemoptysis; No shortness of breath  CARDIOVASCULAR: No chest pain or palpitations  GASTROINTESTINAL: No abdominal or epigastric pain. No nausea, vomiting, or hematemesis; No diarrhea or constipation. No melena or hematochezia.  GENITOURINARY: No dysuria, frequency or hematuria  NEUROLOGICAL: No numbness or weakness  SKIN: No itching, burning, rashes, or lesions   All other review of systems is negative unless indicated above.    MEDICATIONS:  MEDICATIONS  (STANDING):  aspirin enteric coated 81 milliGRAM(s) Oral daily  atorvastatin 40 milliGRAM(s) Oral at bedtime  dextrose 5% + sodium chloride 0.9%. 1000 milliLiter(s) (60 mL/Hr) IV Continuous <Continuous>  dextrose 5%. 1000 milliLiter(s) (50 mL/Hr) IV Continuous <Continuous>  dextrose 5%. 1000 milliLiter(s) (100 mL/Hr) IV Continuous <Continuous>  dextrose 50% Injectable 25 Gram(s) IV Push once  dextrose 50% Injectable 25 Gram(s) IV Push once  dextrose 50% Injectable 12.5 Gram(s) IV Push once  enoxaparin Injectable 150 milliGRAM(s) SubCutaneous every 24 hours  gabapentin 300 milliGRAM(s) Oral three times a day  glucagon  Injectable 1 milliGRAM(s) IntraMuscular once  lacosamide 100 milliGRAM(s) Oral two times a day  levETIRAcetam 1500 milliGRAM(s) Oral <User Schedule>  multivitamin 1 Tablet(s) Oral daily  pantoprazole    Tablet 40 milliGRAM(s) Oral before breakfast      PHYSICAL EXAM:  T(C): 36.5 (05-06-23 @ 21:07), Max: 36.9 (05-06-23 @ 02:11)  HR: 62 (05-06-23 @ 21:07) (61 - 75)  BP: 117/77 (05-06-23 @ 21:07) (117/77 - 135/89)  RR: 18 (05-06-23 @ 21:07) (18 - 18)  SpO2: 97% (05-06-23 @ 21:07) (95% - 99%)  Wt(kg): --  I&O's Summary    05 May 2023 07:01  -  06 May 2023 07:00  --------------------------------------------------------  IN: 360 mL / OUT: 0 mL / NET: 360 mL    06 May 2023 07:01  -  06 May 2023 23:16  --------------------------------------------------------  IN: 480 mL / OUT: 650 mL / NET: -170 mL          Appearance: Normal	  HEENT:  PERRLA   Lymphatic: No lymphadenopathy   Cardiovascular: Normal S1 S2, no JVD  Respiratory: normal effort , clear  Gastrointestinal:  Soft, Non-tender  Skin: No rashes,  warm to touch  Psychiatry:  Mood & affect appropriate  Musculuskeletal: L LEg weakness    recent labs, Imaging and EKGs personally reviewed     05-05-23 @ 07:01  -  05-06-23 @ 07:00  --------------------------------------------------------  IN: 360 mL / OUT: 0 mL / NET: 360 mL    05-06-23 @ 07:01  -  05-06-23 @ 23:16  --------------------------------------------------------  IN: 480 mL / OUT: 650 mL / NET: -170 mL                          12.5   7.26  )-----------( 204      ( 06 May 2023 07:19 )             38.3               05-05    141  |  107  |  14  ----------------------------<  97  3.8   |  25  |  0.86    Ca    8.7      05 May 2023 06:51    TPro  5.7<L>  /  Alb  x   /  TBili  x   /  DBili  x   /  AST  x   /  ALT  x   /  AlkPhos  x   05-05    PT/INR - ( 06 May 2023 07:19 )   PT: 11.8 sec;   INR: 1.03 ratio         PTT - ( 06 May 2023 07:19 )  PTT:72.3 sec

## 2023-05-06 NOTE — PROGRESS NOTE ADULT - ASSESSMENT
Patient is a 52 year old Male with a PMHx of TIA, CVA, chronic left DVT on Eliquis, previous compartment syndrome, S/P fasciotomy in Feb 2023, HLD, PFO, MIMI who was sent in by his outpatient rheumatologist Dr. Muir for a newly discovered Right LE DVT on outpatient US 05/04 in the setting of elevated cryoglobulins. Patient has been adherent to his Eliquis regimen. Fasciotomy scars are healing well with some serosanguinous discharge. Patient admits to globulus sensation with PO intake, Patient denies any leg pain, swelling, chest pain, shortness of breath, fevers, chills.     Deep venous thrombosis  - Pt with chronic LLE DVT, found to have RLE DVT while on ASA and Eliquis for AP/AC.   - Reported to have + cryoglobulin on outpatient assay with workup reportedly nondiagnostic per patient  - Pt follows with [facility], patient reports previous hypercoag work up negative per patient. [facility], Heme/Onc consulted; F/u recs   - Hold Eliquis. ? Eliquis failure --> now on Lovenox 1.5 dose (150 Mg Qd AC)  - Rheum eval appreciated; F/u recs --> Repeat Cryoglobulinemia work up underway   - F/u MR Angio as per Rheum     Globus sensation.  - CT Head without significant change from 01/18; Microvascular ischemic changes in the periventricular and subcortical white matter   - Speech and swallow eval   - Aspiration precautions  - IVF for hydration     Cerebrovascular disease.  - H/O prior CVA --> On ASA and Statin   - F/u MR Angio as per Rheum   - Neuro eval consulted; F/u recs   - CTH stable     H/O Seizure  - C/w Keppra and Vimpat  - neuro follow up     History of fasciotomy.  - Dressings per Vacular/ Wound care   - Wound care eval   - Vascular surgery eval

## 2023-05-07 LAB
APTT BLD: 31.7 SEC — SIGNIFICANT CHANGE UP (ref 27.5–35.5)
CREATININE, URINE RESULT: 51 MG/DL — SIGNIFICANT CHANGE UP
PROT ?TM UR-MCNC: 4 MG/DL — SIGNIFICANT CHANGE UP (ref 0–12)

## 2023-05-07 RX ADMIN — ATORVASTATIN CALCIUM 40 MILLIGRAM(S): 80 TABLET, FILM COATED ORAL at 21:07

## 2023-05-07 RX ADMIN — Medication 1 TABLET(S): at 11:34

## 2023-05-07 RX ADMIN — ENOXAPARIN SODIUM 150 MILLIGRAM(S): 100 INJECTION SUBCUTANEOUS at 09:01

## 2023-05-07 RX ADMIN — GABAPENTIN 300 MILLIGRAM(S): 400 CAPSULE ORAL at 21:07

## 2023-05-07 RX ADMIN — GABAPENTIN 300 MILLIGRAM(S): 400 CAPSULE ORAL at 13:20

## 2023-05-07 RX ADMIN — LEVETIRACETAM 1500 MILLIGRAM(S): 250 TABLET, FILM COATED ORAL at 20:24

## 2023-05-07 RX ADMIN — GABAPENTIN 300 MILLIGRAM(S): 400 CAPSULE ORAL at 06:00

## 2023-05-07 RX ADMIN — PANTOPRAZOLE SODIUM 40 MILLIGRAM(S): 20 TABLET, DELAYED RELEASE ORAL at 06:00

## 2023-05-07 RX ADMIN — LACOSAMIDE 100 MILLIGRAM(S): 50 TABLET ORAL at 06:00

## 2023-05-07 RX ADMIN — LACOSAMIDE 100 MILLIGRAM(S): 50 TABLET ORAL at 17:23

## 2023-05-07 RX ADMIN — Medication 81 MILLIGRAM(S): at 11:34

## 2023-05-07 RX ADMIN — LEVETIRACETAM 1500 MILLIGRAM(S): 250 TABLET, FILM COATED ORAL at 09:01

## 2023-05-07 NOTE — PHYSICAL THERAPY INITIAL EVALUATION ADULT - GAIT TRAINING, PT EVAL
GOAL: Patient will ambulate 150 feet independently with RW in 1-2 weeks. Pt will be able to neg 10 steps w/ 1 HR independently in 1-2 weeks

## 2023-05-07 NOTE — PROGRESS NOTE ADULT - SUBJECTIVE AND OBJECTIVE BOX
Name of Patient : TAMI DUNHAM  MRN: [id number]  Date of visit: 05-07-23      Subjective: Patient seen and examined. No new events except as noted.   Doing okay     REVIEW OF SYSTEMS:    CONSTITUTIONAL: No weakness, fevers or chills  EYES/ENT: No visual changes;  No vertigo or throat pain   NECK: No pain or stiffness  RESPIRATORY: No cough, wheezing, hemoptysis; No shortness of breath  CARDIOVASCULAR: No chest pain or palpitations  GASTROINTESTINAL: No abdominal or epigastric pain. No nausea, vomiting, or hematemesis; No diarrhea or constipation. No melena or hematochezia.  GENITOURINARY: No dysuria, frequency or hematuria  NEUROLOGICAL: No numbness or weakness  SKIN: No itching, burning, rashes, or lesions   All other review of systems is negative unless indicated above.    MEDICATIONS:  MEDICATIONS  (STANDING):  aspirin enteric coated 81 milliGRAM(s) Oral daily  atorvastatin 40 milliGRAM(s) Oral at bedtime  dextrose 5% + sodium chloride 0.9%. 1000 milliLiter(s) (60 mL/Hr) IV Continuous <Continuous>  dextrose 5%. 1000 milliLiter(s) (50 mL/Hr) IV Continuous <Continuous>  dextrose 5%. 1000 milliLiter(s) (100 mL/Hr) IV Continuous <Continuous>  dextrose 50% Injectable 12.5 Gram(s) IV Push once  dextrose 50% Injectable 25 Gram(s) IV Push once  dextrose 50% Injectable 25 Gram(s) IV Push once  enoxaparin Injectable 150 milliGRAM(s) SubCutaneous every 24 hours  gabapentin 300 milliGRAM(s) Oral three times a day  glucagon  Injectable 1 milliGRAM(s) IntraMuscular once  lacosamide 100 milliGRAM(s) Oral two times a day  levETIRAcetam 1500 milliGRAM(s) Oral <User Schedule>  multivitamin 1 Tablet(s) Oral daily  pantoprazole    Tablet 40 milliGRAM(s) Oral before breakfast      PHYSICAL EXAM:  T(C): 36.4 (05-07-23 @ 21:10), Max: 36.6 (05-06-23 @ 23:59)  HR: 81 (05-07-23 @ 21:10) (62 - 81)  BP: 111/74 (05-07-23 @ 21:10) (100/64 - 129/87)  RR: 18 (05-07-23 @ 21:10) (18 - 18)  SpO2: 95% (05-07-23 @ 21:10) (95% - 97%)  Wt(kg): --  I&O's Summary    06 May 2023 07:01  -  07 May 2023 07:00  --------------------------------------------------------  IN: 780 mL / OUT: 1050 mL / NET: -270 mL    07 May 2023 07:01  -  07 May 2023 22:58  --------------------------------------------------------  IN: 420 mL / OUT: 300 mL / NET: 120 mL          Appearance: Normal	  HEENT:  PERRLA   Lymphatic: No lymphadenopathy   Cardiovascular: Normal S1 S2, no JVD  Respiratory: normal effort , clear  Gastrointestinal:  Soft, Non-tender  Skin: No rashes,  warm to touch  Psychiatry:  Mood & affect appropriate  Musculuskeletal: L LE weakness    recent labs, Imaging and EKGs personally reviewed     05-06-23 @ 07:01  -  05-07-23 @ 07:00  --------------------------------------------------------  IN: 780 mL / OUT: 1050 mL / NET: -270 mL    05-07-23 @ 07:01  -  05-07-23 @ 22:58  --------------------------------------------------------  IN: 420 mL / OUT: 300 mL / NET: 120 mL                          12.5   7.26  )-----------( 204      ( 06 May 2023 07:19 )             38.3                     PT/INR - ( 06 May 2023 07:19 )   PT: 11.8 sec;   INR: 1.03 ratio         PTT - ( 07 May 2023 07:12 )  PTT:31.7 sec

## 2023-05-07 NOTE — PHYSICAL THERAPY INITIAL EVALUATION ADULT - ADDITIONAL COMMENTS
Pt lives alone in a pvt house w/ 10 steps and B/L HR. Pt amb w/ RW PTA. Pt was independent w/ ADL's.

## 2023-05-07 NOTE — PHYSICAL THERAPY INITIAL EVALUATION ADULT - PERTINENT HX OF CURRENT PROBLEM, REHAB EVAL
Patient is a 52 year old Male with a PMHx of TIA, CVA, chronic left DVT on Eliquis, previous compartment syndrome, S/P fasciotomy in Feb 2023, HLD, PFO, MIMI who was sent in by his outpatient rheumatologist Dr. Muir for a newly discovered Right LE DVT on outpatient US 05/04 in the setting of elevated cryoglobulins. Patient has been adherent to his Eliquis regimen. Fasciotomy scars are healing well with some serosanguinous discharge. Patient admits to globulus sensation with PO intake, Patient denies any leg pain, swelling, chest pain, shortness of breath, fevers, chills. 5/4 Head CT: No significant interval change 1/18/2023. Atrophy out of proportion for age. Microvascular ischemic changes are suggested in the periventricular and subcortical white matter. No new pathology. 5/3 LE Dop: Acute deep venous thrombosis in the right lower extremity: below the knee. Echogenic linear thrombus in the left popliteal vein, probably sequela of prior DVT. MRI angio pending.

## 2023-05-07 NOTE — PROGRESS NOTE ADULT - ASSESSMENT
Patient is a 52 year old Male with a PMHx of TIA, CVA, chronic left DVT on Eliquis, previous compartment syndrome, S/P fasciotomy in Feb 2023, HLD, PFO, MIMI who was sent in by his outpatient rheumatologist Dr. Muir for a newly discovered Right LE DVT on outpatient US 05/04 in the setting of elevated cryoglobulins. Patient has been adherent to his Eliquis regimen. Fasciotomy scars are healing well with some serosanguinous discharge. Patient admits to globulus sensation with PO intake, Patient denies any leg pain, swelling, chest pain, shortness of breath, fevers, chills.     Deep venous thrombosis  - Pt with chronic LLE DVT, found to have RLE DVT while on ASA and Eliquis for AP/AC.   - Reported to have + cryoglobulin on outpatient assay with workup reportedly nondiagnostic per patient  - Pt follows with [facility], patient reports previous hypercoag work up negative per patient. [facility], Heme/Onc consulted; F/u recs   - Hold Eliquis. ? Eliquis failure --> On heparin gtt with plans to transition to Lovenox 1.5 dose (150 Mg Qd AC)  - Rheum eval appreciated; F/u recs --> Repeat Cryoglobulinemia work up underway   - F/u MR Angio as per Rheum     Globus sensation.  - CT Head without significant change from 01/18; Microvascular ischemic changes in the periventricular and subcortical white matter   - Speech and swallow eval   - Started on Pureed diet pending S+S recs  - Aspiration precautions  - IVF for hydration     Cerebrovascular disease.  - H/O prior CVA --> On ASA and Statin   - F/u MR Angio as per Rheum   - Neuro eval consulted; F/u recs   - CTH stable     H/O Seizure  - C/w Keppra and Vimpat  - neuro follow up     History of fasciotomy.  - Dressings per Vacular/ Wound care   - Wound care eval   - Vascular surgery eval       PPX  - Hep gtt, transition to Lovenox 150 Mg Qd from 05/06

## 2023-05-08 ENCOUNTER — NON-APPOINTMENT (OUTPATIENT)
Age: 53
End: 2023-05-08

## 2023-05-08 ENCOUNTER — APPOINTMENT (OUTPATIENT)
Dept: WOUND CARE | Facility: CLINIC | Age: 53
End: 2023-05-08

## 2023-05-08 LAB
% ALBUMIN: 52.1 % — SIGNIFICANT CHANGE UP
% ALPHA 1: 5.3 % — SIGNIFICANT CHANGE UP
% ALPHA 2: 12.4 % — SIGNIFICANT CHANGE UP
% BETA: 13.7 % — SIGNIFICANT CHANGE UP
% GAMMA: 16.5 % — SIGNIFICANT CHANGE UP
ALBUMIN SERPL ELPH-MCNC: 3 G/DL — LOW (ref 3.6–5.5)
ALBUMIN/GLOB SERPL ELPH: 1.1 RATIO — SIGNIFICANT CHANGE UP
ALPHA1 GLOB SERPL ELPH-MCNC: 0.3 G/DL — SIGNIFICANT CHANGE UP (ref 0.1–0.4)
ALPHA2 GLOB SERPL ELPH-MCNC: 0.7 G/DL — SIGNIFICANT CHANGE UP (ref 0.5–1)
B-GLOBULIN SERPL ELPH-MCNC: 0.8 G/DL — SIGNIFICANT CHANGE UP (ref 0.5–1)
CRYOCRIT SER SPUN WESTERGREN: 0 MM — SIGNIFICANT CHANGE UP (ref 0–0)
CRYOFIB BLD-MCNC: NEGATIVE — SIGNIFICANT CHANGE UP
CRYOGLOB SERPL-MCNC: NEGATIVE — SIGNIFICANT CHANGE UP
GAMMA GLOBULIN: 0.9 G/DL — SIGNIFICANT CHANGE UP (ref 0.6–1.6)
INTERPRETATION SERPL IFE-IMP: SIGNIFICANT CHANGE UP
PROT PATTERN SERPL ELPH-IMP: SIGNIFICANT CHANGE UP

## 2023-05-08 RX ADMIN — PANTOPRAZOLE SODIUM 40 MILLIGRAM(S): 20 TABLET, DELAYED RELEASE ORAL at 06:13

## 2023-05-08 RX ADMIN — GABAPENTIN 300 MILLIGRAM(S): 400 CAPSULE ORAL at 06:14

## 2023-05-08 RX ADMIN — LACOSAMIDE 100 MILLIGRAM(S): 50 TABLET ORAL at 06:13

## 2023-05-08 RX ADMIN — LEVETIRACETAM 1500 MILLIGRAM(S): 250 TABLET, FILM COATED ORAL at 09:01

## 2023-05-08 RX ADMIN — Medication 1 TABLET(S): at 11:30

## 2023-05-08 RX ADMIN — GABAPENTIN 300 MILLIGRAM(S): 400 CAPSULE ORAL at 21:03

## 2023-05-08 RX ADMIN — GABAPENTIN 300 MILLIGRAM(S): 400 CAPSULE ORAL at 13:19

## 2023-05-08 RX ADMIN — ATORVASTATIN CALCIUM 40 MILLIGRAM(S): 80 TABLET, FILM COATED ORAL at 21:04

## 2023-05-08 RX ADMIN — ENOXAPARIN SODIUM 150 MILLIGRAM(S): 100 INJECTION SUBCUTANEOUS at 09:01

## 2023-05-08 RX ADMIN — LACOSAMIDE 100 MILLIGRAM(S): 50 TABLET ORAL at 17:22

## 2023-05-08 RX ADMIN — LEVETIRACETAM 1500 MILLIGRAM(S): 250 TABLET, FILM COATED ORAL at 21:04

## 2023-05-08 RX ADMIN — Medication 81 MILLIGRAM(S): at 11:30

## 2023-05-08 NOTE — PROGRESS NOTE ADULT - SUBJECTIVE AND OBJECTIVE BOX
Neurology     S: patient seen and examined. no complaints     Medications:   MEDICATIONS  (STANDING):  aspirin enteric coated 81 milliGRAM(s) Oral daily  atorvastatin 40 milliGRAM(s) Oral at bedtime  dextrose 5% + sodium chloride 0.9%. 1000 milliLiter(s) (60 mL/Hr) IV Continuous <Continuous>  dextrose 5%. 1000 milliLiter(s) (50 mL/Hr) IV Continuous <Continuous>  dextrose 5%. 1000 milliLiter(s) (100 mL/Hr) IV Continuous <Continuous>  dextrose 50% Injectable 25 Gram(s) IV Push once  dextrose 50% Injectable 25 Gram(s) IV Push once  dextrose 50% Injectable 12.5 Gram(s) IV Push once  enoxaparin Injectable 150 milliGRAM(s) SubCutaneous every 24 hours  gabapentin 300 milliGRAM(s) Oral three times a day  glucagon  Injectable 1 milliGRAM(s) IntraMuscular once  lacosamide 100 milliGRAM(s) Oral two times a day  levETIRAcetam 1500 milliGRAM(s) Oral <User Schedule>  multivitamin 1 Tablet(s) Oral daily  pantoprazole    Tablet 40 milliGRAM(s) Oral before breakfast    MEDICATIONS  (PRN):  acetaminophen     Tablet .. 650 milliGRAM(s) Oral every 6 hours PRN Temp greater or equal to 38C (100.4F), Mild Pain (1 - 3)  dextrose Oral Gel 15 Gram(s) Oral once PRN Blood Glucose LESS THAN 70 milliGRAM(s)/deciliter  oxyCODONE    IR 5 milliGRAM(s) Oral every 6 hours PRN Moderate Pain (4 - 6)       Vitals:  Vital Signs Last 24 Hrs  T(C): 36.6 (05 May 2023 05:01), Max: 36.7 (04 May 2023 11:40)  T(F): 97.8 (05 May 2023 05:01), Max: 98.1 (04 May 2023 17:46)  HR: 69 (05 May 2023 05:01) (64 - 72)  BP: 119/72 (05 May 2023 05:01) (116/73 - 146/92)  BP(mean): 87 (04 May 2023 17:46) (87 - 92)  RR: 18 (05 May 2023 05:01) (17 - 20)  SpO2: 97% (05 May 2023 05:01) (94% - 97%)    Parameters below as of 05 May 2023 05:01  Patient On (Oxygen Delivery Method): room air        General Exam:   General Appearance: Appropriately dressed and in no acute distress       Head: Normocephalic, atraumatic and no dysmorphic features  Ear, Nose, and Throat: Moist mucous membranes  CVS: S1S2+  Resp: No SOB, no wheeze or rhonchi  GI: soft NT/ND  Extremities: No edema or cyanosis  Skin: No bruises or rashes     Neurological Exam:  Mental Status: Awake, alert and oriented x 3.  Able to follow simple and complex verbal commands. Able to name and repeat. fluent speech. No obvious aphasia or dysarthria noted.   Cranial Nerves: PERRL, EOMI, VFFC, sensation V1-V3 intact,  no obvious facial asymmetry, equal elevation of palate, scm/trap 5/5, tongue is midline on protrusion. no obvious papilledema on fundoscopic exam. hearing is grossly intact.   Motor: Normal bulk, tone and strength throughout L foot drop   Sensation: Intact to light touch and pinprick throughout. no right/left confusion. no extinction to tactile on DSS. Romberg was negative.   Reflexes: 1+ throughout at biceps, brachioradialis, triceps, patellars and ankles bilaterally and equal. No clonus. R toe and L toe were both downgoing.  Coordination: No dysmetria on FNF or HKS  Gait:  walker     Data/Labs/Imaging which I personally reviewed.     Labs:      no new labs   < from: MR Angio Head No Cont (05.06.23 @ 22:44) >    ACC: [id number] EXAM:  MR ANGIO BRAIN   ORDERED BY: JUNIOR WINSTON     PROCEDURE DATE:  05/06/2023          INTERPRETATION:  CLINICAL INDICATIONS:mild dolichoectasia    TECHNIQUE: MRA brain. 3-D time of flight imaging with 2D MIP   reconstructions.    COMPARISON: MRA brain dated 4/26/2023    FINDINGS:  Borderline to mild dolichoectasia right vertebral artery extending to the   proximal basilar artery. Left vertebral artery is hypoplastic in   comparison.  The [place] and vertebrobasilar system are otherwise   unremarkable without evidence of stenosis, occlusion or saccular aneurysm   dilation. No evidence for arterial venous malformation. The vertebral   arteries are codominant.    IMPRESSION: No interval change.    --- End of Report ---            GIRMA HOYT MD; Attending Radiologist  This document has been electronically signed. May  7 2023 10:34AM    < end of copied text >

## 2023-05-08 NOTE — CHART NOTE - NSCHARTNOTEFT_GEN_A_CORE
Patient had recent repeat cryoglobulin level as negative with negative cryoglobulinemia work up. Rheumatology to sign off at this time. AC to be dictated by hematology. Patient can follow up with Dr. Muir at the [address] [place] [address] after discharge    Lukasz Trotter MD, PGY-4  Rheumatology Fellow  Reachable on TEAMS

## 2023-05-08 NOTE — PROGRESS NOTE ADULT - SUBJECTIVE AND OBJECTIVE BOX
Name of Patient : TAMI DUNHAM  MRN: [id number]  Date of visit: 05-08-23 @ 16:29      Subjective: Patient seen and examined. No new events except as noted.   Patient seen earlier this AM. Family at the bedside. Winter updated via telephone at the patient's bedside.   Patient reports ambulating around floor this AM.   LLE Wrapped in ACE Wrap  Pain controlled    REVIEW OF SYSTEMS:    CONSTITUTIONAL: No weakness, fevers or chills  EYES/ENT: No visual changes;  No vertigo or throat pain   NECK: No pain or stiffness  RESPIRATORY: No cough, wheezing, hemoptysis; No shortness of breath  CARDIOVASCULAR: No chest pain or palpitations  GASTROINTESTINAL: No abdominal or epigastric pain. No nausea, vomiting, or hematemesis; No diarrhea or constipation. No melena or hematochezia.  GENITOURINARY: No dysuria, frequency or hematuria  NEUROLOGICAL: No numbness or weakness  SKIN: LLE wrapped in ACE wrap   All other review of systems is negative unless indicated above.    MEDICATIONS:  MEDICATIONS  (STANDING):  aspirin enteric coated 81 milliGRAM(s) Oral daily  atorvastatin 40 milliGRAM(s) Oral at bedtime  dextrose 5% + sodium chloride 0.9%. 1000 milliLiter(s) (60 mL/Hr) IV Continuous <Continuous>  dextrose 5%. 1000 milliLiter(s) (50 mL/Hr) IV Continuous <Continuous>  dextrose 5%. 1000 milliLiter(s) (100 mL/Hr) IV Continuous <Continuous>  dextrose 50% Injectable 25 Gram(s) IV Push once  dextrose 50% Injectable 25 Gram(s) IV Push once  dextrose 50% Injectable 12.5 Gram(s) IV Push once  enoxaparin Injectable 150 milliGRAM(s) SubCutaneous every 24 hours  gabapentin 300 milliGRAM(s) Oral three times a day  glucagon  Injectable 1 milliGRAM(s) IntraMuscular once  lacosamide 100 milliGRAM(s) Oral two times a day  levETIRAcetam 1500 milliGRAM(s) Oral <User Schedule>  multivitamin 1 Tablet(s) Oral daily  pantoprazole    Tablet 40 milliGRAM(s) Oral before breakfast      PHYSICAL EXAM:  T(C): 36.6 (05-08-23 @ 14:36), Max: 36.7 (05-08-23 @ 05:25)  HR: 70 (05-08-23 @ 14:36) (65 - 81)  BP: 120/79 (05-08-23 @ 14:36) (111/74 - 132/86)  RR: 18 (05-08-23 @ 14:36) (18 - 18)  SpO2: 96% (05-08-23 @ 14:36) (95% - 97%)  Wt(kg): --  I&O's Summary    07 May 2023 07:01  -  08 May 2023 07:00  --------------------------------------------------------  IN: 420 mL / OUT: 600 mL / NET: -180 mL          Appearance: Awake, Lying down in bed   HEENT: Eyes are open    Lymphatic: No lymphadenopathy   Cardiovascular: Normal    Respiratory: normal effort , clear  Gastrointestinal:  Soft, Non-tender  Skin: No rashes,  warm to touch  Psychiatry:  Mood & affect appropriate  Musculoskeletal: LLE wrapped in ACE Wrap       05-07-23 @ 07:01  -  05-08-23 @ 07:00  --------------------------------------------------------  IN: 420 mL / OUT: 600 mL / NET: -180 mL                          PTT - ( 07 May 2023 07:12 )  PTT:31.7 sec                     < from: MR Angio Head No Cont (05.06.23 @ 22:44) >    IMPRESSION: No interval change.    < end of copied text >

## 2023-05-08 NOTE — PROGRESS NOTE ADULT - ASSESSMENT
Patient is a 52 year old Male with a PMHx of TIA, CVA, chronic left DVT on Eliquis, previous compartment syndrome, S/P fasciotomy in Feb 2023, HLD, PFO, MIMI who was sent in by his outpatient rheumatologist Dr. Muir for a newly discovered Right LE DVT on outpatient US 05/04 in the setting of elevated cryoglobulins. Patient has been adherent to his Eliquis regimen. Fasciotomy scars are healing well with some serosanguinous discharge. Patient admits to globulus sensation with PO intake, Patient denies any leg pain, swelling, chest pain, shortness of breath, fevers, chills.     Deep venous thrombosis  - Pt with chronic LLE DVT, found to have RLE DVT while on ASA and Eliquis for AP/AC.   - Reported to have + cryoglobulin on outpatient assay with workup reportedly nondiagnostic per patient  - Pt follows with [facility], patient reports previous hypercoag work up negative per patient. [facility], Heme/Onc consulted; F/u recs   - Hold Eliquis. ? Eliquis failure --> Transitioned to Lovenox 1.5 dose (150 Mg Qd AC)  - Rheum eval appreciated; F/u recs --> Repeat Cryoglobulinemia work up underway   - F/u MR Angio as noted; F/u neuro recs   - Discussed with attending. C/w Lovenox 150 Qd for AC Q24 hours     Globus sensation.  - CT Head without significant change from 01/18; Microvascular ischemic changes in the periventricular and subcortical white matter   - Speech and swallow eval   - Started on Pureed diet pending S+S recs --> Planned for MBS 05/09 AM per speech department   - Aspiration precautions  - IVF for hydration     Cerebrovascular disease.  - H/O prior CVA --> On ASA and Statin   - F/u MR Angio as per Rheum   - Neuro eval consulted; F/u recs   - CTH stable     H/O Seizure  - C/w Keppra and Vimpat  - neuro follow up     History of fasciotomy.  - Dressings per Vacular/ Wound care   - Wound care eval   - Vascular surgery eval       PPX  - Transitioned to Lovenox 150 Mg Qd from 05/06     Discussed in detail with patient, family at the bedside and Winter via telephone. All questions answered.

## 2023-05-08 NOTE — PROGRESS NOTE ADULT - ASSESSMENT
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 Muir from 4/25/23--patient with a history of multiple TIA/ CVA apparently dating to 2011, 2013 (unclear to mechanism), with no interval episodes until Feb 2022, Aug 2022 and Nov 2022 with residual expressive aphasia maintained on apixaban, hyperlipidemia and apparently questionable ASD/ PFO with an admission to [facility] in Dec 2022 with patient noted unresponsive at home with patient noted to have blood in the mouth with scattered ecchymoses in the arms, face, chest with fever at the time, elevated BP and patient intubated for airway protection at the time with empiric antibiotics for presumed aspiration syndrome, with CTT head nondiagnostic other than old Ca++ mid alexis, EEG nondiagnostic, echo at the time with NO PFO, with an event on 12/5/22 with a Rapid Response call with tongue laceration and posturing concern for status epilepticus w/ patient started on Keppra, MIMI with small PFO noted.    EEG with seizures captured.  LP for fever negative for paraneoplastic encephalitis.   Patient was extubated on 12/8/22.  MRI brain with B/L lentiform nucleus and pontine enhancement, but with NS did not recommend tissue sampling.  Patient was readmitted to [facility] on 1/18/23 following delirium at the rehab with patient's parents noting patient to have confusion and word searching difficulty, patient underwent an emergent LEFT LE fasciotomy following presentation with compartment syndrome, chronic LEFT foot drop, with short course of IV Zosyn and avoidance of LEFT BKA,     multiple testing for LAC/B2GPI/aCL/atypical APLS - negative/ extensive thrombophilia hematol evaluation - negative  Positive: cryoglobulins with cryocrit 34 with negative: SHAY/dsDNA/Sm/MPO/PR3/C3/C4/RF/CCP/Sjogrens/scleroderma/centromere/IgG4/ACE  MRA H with mild dolichoectasia R VA to prox BA; L VA hypoplastic     Impression:   patient previously was on testosterone supplements and was though his prior embolic strokes 2/2 hormonal supplement.   he had result seizures     - PT   - MBS   - patient has been on Eliquis 5mg BID and ASA 81mg daily and still had breakthrough DVT--> plan for coumadin vs lovenox   - should be on statin for secondary stroke preventoin  - f/u hematology.  was on heparin drip.  patient now may need to be on coumadin or lovenox; sees Dr. Varela outpatient --> recs appreicated   - f/u rheum  - has been following at [facility] as well and they also don't seem to have a diagnosis   - c/w keppra and vimpat for seiuzre ppx.   - consider MRI L spine   - check FS, glucose control <180  - GI/DVT ppx   - spoke [facility] family friend via phone Winter Forde MD  Vascular Neurology  Office: [phone]

## 2023-05-09 ENCOUNTER — APPOINTMENT (OUTPATIENT)
Dept: RHEUMATOLOGY | Facility: CLINIC | Age: 53
End: 2023-05-09

## 2023-05-09 ENCOUNTER — TRANSCRIPTION ENCOUNTER (OUTPATIENT)
Age: 53
End: 2023-05-09

## 2023-05-09 PROCEDURE — 74230 X-RAY XM SWLNG FUNCJ C+: CPT | Mod: 26

## 2023-05-09 RX ORDER — APIXABAN 2.5 MG/1
1 TABLET, FILM COATED ORAL
Refills: 0 | DISCHARGE

## 2023-05-09 RX ORDER — ENOXAPARIN SODIUM 100 MG/ML
150 INJECTION SUBCUTANEOUS
Qty: 30 | Refills: 0
Start: 2023-05-09 | End: 2023-06-07

## 2023-05-09 RX ADMIN — Medication 81 MILLIGRAM(S): at 11:24

## 2023-05-09 RX ADMIN — GABAPENTIN 300 MILLIGRAM(S): 400 CAPSULE ORAL at 13:32

## 2023-05-09 RX ADMIN — GABAPENTIN 300 MILLIGRAM(S): 400 CAPSULE ORAL at 06:04

## 2023-05-09 RX ADMIN — ENOXAPARIN SODIUM 150 MILLIGRAM(S): 100 INJECTION SUBCUTANEOUS at 09:20

## 2023-05-09 RX ADMIN — LEVETIRACETAM 1500 MILLIGRAM(S): 250 TABLET, FILM COATED ORAL at 21:02

## 2023-05-09 RX ADMIN — Medication 1 TABLET(S): at 11:24

## 2023-05-09 RX ADMIN — ATORVASTATIN CALCIUM 40 MILLIGRAM(S): 80 TABLET, FILM COATED ORAL at 21:02

## 2023-05-09 RX ADMIN — GABAPENTIN 300 MILLIGRAM(S): 400 CAPSULE ORAL at 21:02

## 2023-05-09 RX ADMIN — LEVETIRACETAM 1500 MILLIGRAM(S): 250 TABLET, FILM COATED ORAL at 09:20

## 2023-05-09 RX ADMIN — LACOSAMIDE 100 MILLIGRAM(S): 50 TABLET ORAL at 06:04

## 2023-05-09 RX ADMIN — PANTOPRAZOLE SODIUM 40 MILLIGRAM(S): 20 TABLET, DELAYED RELEASE ORAL at 06:04

## 2023-05-09 RX ADMIN — LACOSAMIDE 100 MILLIGRAM(S): 50 TABLET ORAL at 17:08

## 2023-05-09 NOTE — DISCHARGE NOTE PROVIDER - NSDCMRMEDTOKEN_GEN_ALL_CORE_FT
aspirin 81 mg oral delayed release tablet: 1 tab(s) orally once a day  atorvastatin 40 mg oral tablet: 1 tab(s) orally once a day  enoxaparin 150 mg/mL injectable solution: 150 milligram(s) subcutaneously once a day 30 day supply  gabapentin 300 mg oral capsule: 1 orally 3 times a day  Keppra: 1500 milligram(s) orally 2 times a day  Multiple Vitamins oral tablet: 1 tab(s) orally once a day  oxyCODONE 5 mg oral tablet: 1 tab(s) orally every 6 hours, As needed, Severe Pain (7 - 10)  pantoprazole 40 mg oral delayed release tablet: 1 orally once a day  Vimpat 100 mg oral tablet: 1 tab(s) orally 2 times a day

## 2023-05-09 NOTE — SWALLOW VFSS/MBS ASSESSMENT ADULT - DIAGNOSTIC IMPRESSIONS
53 y/o M with h/o multiple CVAs/TIAs was sent in by his outpatient rheumatologist for a newly discovered Right LE DVT. Pt was seen today for Modified Barium Swallow Study (MBS) given c/o intermittent globus sensation with foods/liquids. Pt presents with a mild pharyngeal dysphagia. Timely mastication of solids. Penetration with full retrieval with thin liquids. Depth of penetration improved with cued small single sips. No penetration or aspiration with puree or solids. Trace pharyngeal residue noted during exam. Of note, pt denied any globus sensation during exam. Disorders: reduced laryngeal closure. Consider GI f/u as OP given report of globus sensation in the absence of significant pharyngeal residue.

## 2023-05-09 NOTE — SWALLOW VFSS/MBS ASSESSMENT ADULT - SLP PERTINENT HISTORY OF CURRENT PROBLEM
...hx continued: EEG nondiagnostic, echo at the time with NO PFO, with an event on 12/5/22 with a Rapid Response call with tongue laceration and posturing concern for status epilepticus w/ patient started on Keppra, MIMI with small PFO noted.    EEG with seizures captured.  LP for fever negative for paraneoplastic encephalitis.   Patient was extubated on 12/8/22.  MRI brain with B/L lentiform nucleus and pontine enhancement, but with NS did not recommend tissue sampling.  Patient was readmitted to [facility] on 1/18/23 following delirium at the rehab with patient's parents noting patient to have confusion and word searching difficulty, patient underwent an emergent LEFT LE fasciotomy following presentation with compartment syndrome, chronic LEFT foot drop, with short course of IV Zosyn and avoidance of LEFT BKA...

## 2023-05-09 NOTE — SWALLOW VFSS/MBS ASSESSMENT ADULT - RECOMMENDED FEEDING/EATING TECHNIQUES
SMALL SINGLE SIPS/allow for swallow between intakes/alternate food with liquid/maintain upright posture during/after eating for 30 mins/oral hygiene

## 2023-05-09 NOTE — SWALLOW VFSS/MBS ASSESSMENT ADULT - DEMONSTRATES NEED FOR REFERRAL TO ANOTHER SERVICE
Consider GI f/u as OP given report of globus sensation in the absence of significant pharyngeal residue.

## 2023-05-09 NOTE — DISCHARGE NOTE PROVIDER - NSDCCPCAREPLAN_GEN_ALL_CORE_FT
PRINCIPAL DISCHARGE DIAGNOSIS  Diagnosis: Deep venous thrombosis  Assessment and Plan of Treatment: Pt with chronic LLE DVT, found to have RLE DVT while on ASA and Eliquis for AP/AC and transitioned to Lovenox for dischage   - Reported to have + cryoglobulin on outpatient assay with workup reportedly nondiagnostic per patient  - Pt follows with [facility], patient reports previous hypercoag work up negative per patient.   - Rheum eval appreciated;  Repeat Cryoglobulinemia work up reported to be negative 05/08 note). Outpatient rheum F/u          SECONDARY DISCHARGE DIAGNOSES  Diagnosis: Globus sensation  Assessment and Plan of Treatment: CT Head without significant change from 01/18;   - Speech and swallow eval  - S/P MBS 05/09, on regular diet. Reports globulus sensation has improved.    - Aspiration precautions    Diagnosis: History of fasciotomy  Assessment and Plan of Treatment: Aquacel dressing QD  BLE elevation & Compression  Moisturize intact skin w/ SWEEN cream BID    Diagnosis: Seizure  Assessment and Plan of Treatment: c/w Keppra and Vimpat

## 2023-05-09 NOTE — PROGRESS NOTE ADULT - ASSESSMENT
Patient is a 52 year old Male with a PMHx of TIA, CVA, chronic left DVT on Eliquis, previous compartment syndrome, S/P fasciotomy in Feb 2023, HLD, PFO, MIMI who was sent in by his outpatient rheumatologist Dr. Muir for a newly discovered Right LE DVT on outpatient US 05/04 in the setting of elevated cryoglobulins. Patient has been adherent to his Eliquis regimen. Fasciotomy scars are healing well with some serosanguinous discharge. Patient admits to globulus sensation with PO intake, Patient denies any leg pain, swelling, chest pain, shortness of breath, fevers, chills.     Deep venous thrombosis  - Pt with chronic LLE DVT, found to have RLE DVT while on ASA and Eliquis for AP/AC.   - Reported to have + cryoglobulin on outpatient assay with workup reportedly nondiagnostic per patient  - Pt follows with [facility], patient reports previous hypercoag work up negative per patient. [facility], Heme/Onc consulted; F/u recs   - Hold Eliquis. ? Eliquis failure --> Transitioned to Lovenox 1.5 dose (150 Mg Qd AC)  - Rheum eval appreciated; F/u recs --> Repeat Cryoglobulinemia work up reported to be negative 05/08 (chart note). Outpatient rheum F/u    - F/u MR Angio as noted; F/u neuro recs   - Discussed with attending. C/w Lovenox 150 Qd for AC Q24 hours; Patient agreeable. Lovenox teaching     Globus sensation.  - CT Head without significant change from 01/18; Microvascular ischemic changes in the periventricular and subcortical white matter --> On ASA   - Speech and swallow eval  - S/P IVF; PO hydration encouraged   - S/P MBS 05/09, on regular diet. Reports globulus sensation has improved.    - Aspiration precautions    Cerebrovascular disease.  - H/O prior CVA --> On ASA and Statin   - F/u MR Angio as per Rheum / Neuro   - Neuro eval consulted; F/u recs   - CTH stable     H/O Seizure  - C/w Keppra and Vimpat  - neuro follow up     History of fasciotomy.  - Dressings per Vacular/ Wound care   - Wound care eval   - Vascular surgery eval       PPX  - Transitioned to Lovenox 150 Mg Qd from 05/06     Discussed in detail with patient, and family at the bedside.

## 2023-05-09 NOTE — PROGRESS NOTE ADULT - SUBJECTIVE AND OBJECTIVE BOX
Name of Patient : TAMI DUNHAM  MRN: [id number]  Date of visit: 05-09-23 @ 15:32      Subjective: Patient seen and examined. No new events except as noted.   Patient seen earlier this AM. Family at the bedside  S/P MBS this AM. Started on Regular diet  Agreeable to Lovenox for AC  Reports globulus sensation has subsided    REVIEW OF SYSTEMS:    CONSTITUTIONAL: No weakness, fevers or chills  EYES/ENT: No visual changes;  No vertigo or throat pain   NECK: No pain or stiffness  RESPIRATORY: No cough, wheezing, hemoptysis; No shortness of breath  CARDIOVASCULAR: No chest pain or palpitations  GASTROINTESTINAL: No abdominal or epigastric pain. No nausea, vomiting, or hematemesis; No diarrhea or constipation. No melena or hematochezia.  GENITOURINARY: No dysuria, frequency or hematuria  NEUROLOGICAL: No numbness or weakness  SKIN: LLE dressing   All other review of systems is negative unless indicated above.    MEDICATIONS:  MEDICATIONS  (STANDING):  aspirin enteric coated 81 milliGRAM(s) Oral daily  atorvastatin 40 milliGRAM(s) Oral at bedtime  dextrose 5%. 1000 milliLiter(s) (50 mL/Hr) IV Continuous <Continuous>  dextrose 5%. 1000 milliLiter(s) (100 mL/Hr) IV Continuous <Continuous>  dextrose 50% Injectable 25 Gram(s) IV Push once  dextrose 50% Injectable 25 Gram(s) IV Push once  dextrose 50% Injectable 12.5 Gram(s) IV Push once  enoxaparin Injectable 150 milliGRAM(s) SubCutaneous every 24 hours  gabapentin 300 milliGRAM(s) Oral three times a day  glucagon  Injectable 1 milliGRAM(s) IntraMuscular once  lacosamide 100 milliGRAM(s) Oral two times a day  levETIRAcetam 1500 milliGRAM(s) Oral <User Schedule>  multivitamin 1 Tablet(s) Oral daily  pantoprazole    Tablet 40 milliGRAM(s) Oral before breakfast      PHYSICAL EXAM:  T(C): 36.4 (05-09-23 @ 14:00), Max: 37 (05-09-23 @ 00:11)  HR: 73 (05-09-23 @ 14:00) (63 - 78)  BP: 115/80 (05-09-23 @ 14:00) (107/70 - 120/79)  RR: 16 (05-09-23 @ 14:00) (16 - 18)  SpO2: 96% (05-09-23 @ 14:00) (95% - 98%)  Wt(kg): --  I&O's Summary    08 May 2023 07:01  -  09 May 2023 07:00  --------------------------------------------------------  IN: 660 mL / OUT: 0 mL / NET: 660 mL            Appearance: Awake, Sitting up in bed   HEENT: Eyes are open    Lymphatic: No lymphadenopathy   Cardiovascular: Normal    Respiratory: normal effort , clear  Gastrointestinal:  Soft, Non-tender  Skin: No rashes,  warm to touch  Psychiatry:  Mood & affect appropriate  Musculoskeletal: LLE wrapped in ACE Wrap           05-08-23 @ 07:01  -  05-09-23 @ 07:00  --------------------------------------------------------  IN: 660 mL / OUT: 0 mL / NET: 660 mL

## 2023-05-09 NOTE — PROGRESS NOTE ADULT - SUBJECTIVE AND OBJECTIVE BOX
Neurology     S: MBS today. aunt and uncle at bedside     Medications:   MEDICATIONS  (STANDING):  aspirin enteric coated 81 milliGRAM(s) Oral daily  atorvastatin 40 milliGRAM(s) Oral at bedtime  dextrose 5% + sodium chloride 0.9%. 1000 milliLiter(s) (60 mL/Hr) IV Continuous <Continuous>  dextrose 5%. 1000 milliLiter(s) (50 mL/Hr) IV Continuous <Continuous>  dextrose 5%. 1000 milliLiter(s) (100 mL/Hr) IV Continuous <Continuous>  dextrose 50% Injectable 25 Gram(s) IV Push once  dextrose 50% Injectable 25 Gram(s) IV Push once  dextrose 50% Injectable 12.5 Gram(s) IV Push once  enoxaparin Injectable 150 milliGRAM(s) SubCutaneous every 24 hours  gabapentin 300 milliGRAM(s) Oral three times a day  glucagon  Injectable 1 milliGRAM(s) IntraMuscular once  lacosamide 100 milliGRAM(s) Oral two times a day  levETIRAcetam 1500 milliGRAM(s) Oral <User Schedule>  multivitamin 1 Tablet(s) Oral daily  pantoprazole    Tablet 40 milliGRAM(s) Oral before breakfast    MEDICATIONS  (PRN):  acetaminophen     Tablet .. 650 milliGRAM(s) Oral every 6 hours PRN Temp greater or equal to 38C (100.4F), Mild Pain (1 - 3)  dextrose Oral Gel 15 Gram(s) Oral once PRN Blood Glucose LESS THAN 70 milliGRAM(s)/deciliter  oxyCODONE    IR 5 milliGRAM(s) Oral every 6 hours PRN Moderate Pain (4 - 6)       Vitals:  Vital Signs Last 24 Hrs  T(C): 36.4 (09 May 2023 14:00), Max: 37 (09 May 2023 00:11)  T(F): 97.6 (09 May 2023 14:00), Max: 98.6 (09 May 2023 00:11)  HR: 73 (09 May 2023 14:00) (63 - 78)  BP: 115/80 (09 May 2023 14:00) (107/70 - 120/79)  BP(mean): --  RR: 16 (09 May 2023 14:00) (16 - 18)  SpO2: 96% (09 May 2023 14:00) (95% - 98%)    Parameters below as of 09 May 2023 14:00  Patient On (Oxygen Delivery Method): room air        General Exam:   General Appearance: Appropriately dressed and in no acute distress       Head: Normocephalic, atraumatic and no dysmorphic features  Ear, Nose, and Throat: Moist mucous membranes  CVS: S1S2+  Resp: No SOB, no wheeze or rhonchi  GI: soft NT/ND  Extremities: No edema or cyanosis  Skin: No bruises or rashes     Neurological Exam:  Mental Status: Awake, alert and oriented x 3.  Able to follow simple and complex verbal commands. Able to name and repeat. fluent speech. No obvious aphasia or dysarthria noted.   Cranial Nerves: PERRL, EOMI, VFFC, sensation V1-V3 intact,  no obvious facial asymmetry, equal elevation of palate, scm/trap 5/5, tongue is midline on protrusion. no obvious papilledema on fundoscopic exam. hearing is grossly intact.   Motor: Normal bulk, tone and strength throughout L foot drop   Sensation: Intact to light touch and pinprick throughout. no right/left confusion. no extinction to tactile on DSS. Romberg was negative.   Reflexes: 1+ throughout at biceps, brachioradialis, triceps, patellars and ankles bilaterally and equal. No clonus. R toe and L toe were both downgoing.  Coordination: No dysmetria on FNF or HKS  Gait:  walker     Data/Labs/Imaging which I personally reviewed.     Labs:      no new labs   < from: MR Angio Head No Cont (05.06.23 @ 22:44) >    ACC: [id number] EXAM:  MR ANGIO BRAIN   ORDERED BY: JUNIOR WINSTON     PROCEDURE DATE:  05/06/2023          INTERPRETATION:  CLINICAL INDICATIONS:mild dolichoectasia    TECHNIQUE: MRA brain. 3-D time of flight imaging with 2D MIP   reconstructions.    COMPARISON: MRA brain dated 4/26/2023    FINDINGS:  Borderline to mild dolichoectasia right vertebral artery extending to the   proximal basilar artery. Left vertebral artery is hypoplastic in   comparison.  The [place] and vertebrobasilar system are otherwise   unremarkable without evidence of stenosis, occlusion or saccular aneurysm   dilation. No evidence for arterial venous malformation. The vertebral   arteries are codominant.    IMPRESSION: No interval change.    --- End of Report ---            GIRMA HOYT MD; Attending Radiologist  This document has been electronically signed. May  7 2023 10:34AM    < end of copied text >

## 2023-05-09 NOTE — PROGRESS NOTE ADULT - NS ATTEND AMEND GEN_ALL_CORE FT
Pt care and plan discussed and reviewed with PA. Plan as outlined above edited by me to reflect our discussion. Advanced care planning/advanced directives discussed with patient/family. DNR status including forceful chest compressions to attempt to restart the heart, ventilator support/artificial breathing, electric shock, artificial nutrition, health care proxy, Molst form all discussed with pt.

## 2023-05-09 NOTE — DISCHARGE NOTE PROVIDER - CARE PROVIDER_API CALL
Rodolfo Johnson  Internal Medicine  [address] [facility], [address] [place] [id number]  Phone: [phone]  Fax: [phone]  Follow Up Time:

## 2023-05-09 NOTE — SWALLOW VFSS/MBS ASSESSMENT ADULT - LARYNGEAL PENETRATION DURING THE SWALLOW - SILENT
Consistent mild shallow penetration from the interarytenoid space with complete retrieval upon completion of the swallow. x2 instances of trace penetration (though moderate depth) from laryngeal surface of the epiglottis with complete retrieval.

## 2023-05-09 NOTE — SWALLOW VFSS/MBS ASSESSMENT ADULT - COMMENTS
...H&P Continued: Patient relates decade long history of generalized undifferentiated fatigue since his college years, with apparently a previous neurologist prescribing Adderall. Patient denies acute LEFT leg pain with prior fasciotomy. Patient does report intermittent globus sensation with food 2 days ago. Denies coughing with solids/liquids. NO weight loss or anorexia.    IMAGING:  CT Head 5/4/23:  IMPRESSION: No significant interval change 1/18/2023. Atrophy out of proportion for age. Microvascular ischemic changes are suggested in the   periventricular and subcortical white matter. No new pathology.    CXR  IMPRESSION: Right lower lobe hazy opacity, likely atelectasis. Increased perihilar opacities, suggestive of mild pulmonary vascular congestion versus   vascular crowding from low lung volumes.    Patient is well-known to this service (first seen in 2010, last seen for MBS in 12/22 with recommendation for easy to chew/thins).

## 2023-05-09 NOTE — PROGRESS NOTE ADULT - ASSESSMENT
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 Muir from 4/25/23--patient with a history of multiple TIA/ CVA apparently dating to 2011, 2013 (unclear to mechanism), with no interval episodes until Feb 2022, Aug 2022 and Nov 2022 with residual expressive aphasia maintained on apixaban, hyperlipidemia and apparently questionable ASD/ PFO with an admission to [facility] in Dec 2022 with patient noted unresponsive at home with patient noted to have blood in the mouth with scattered ecchymoses in the arms, face, chest with fever at the time, elevated BP and patient intubated for airway protection at the time with empiric antibiotics for presumed aspiration syndrome, with CTT head nondiagnostic other than old Ca++ mid alexis, EEG nondiagnostic, echo at the time with NO PFO, with an event on 12/5/22 with a Rapid Response call with tongue laceration and posturing concern for status epilepticus w/ patient started on Keppra, MIMI with small PFO noted.    EEG with seizures captured.  LP for fever negative for paraneoplastic encephalitis.   Patient was extubated on 12/8/22.  MRI brain with B/L lentiform nucleus and pontine enhancement, but with NS did not recommend tissue sampling.  Patient was readmitted to [facility] on 1/18/23 following delirium at the rehab with patient's parents noting patient to have confusion and word searching difficulty, patient underwent an emergent LEFT LE fasciotomy following presentation with compartment syndrome, chronic LEFT foot drop, with short course of IV Zosyn and avoidance of LEFT BKA,     multiple testing for LAC/B2GPI/aCL/atypical APLS - negative/ extensive thrombophilia hematol evaluation - negative  Positive: cryoglobulins with cryocrit 34 with negative: SHAY/dsDNA/Sm/MPO/PR3/C3/C4/RF/CCP/Sjogrens/scleroderma/centromere/IgG4/ACE  MRA H with mild dolichoectasia R VA to prox BA; L VA hypoplastic     Impression:   patient previously was on testosterone supplements and was though his prior embolic strokes 2/2 hormonal supplement.   he had result seizures     - PT   - MBS today   - patient has been on Eliquis 5mg BID and ASA 81mg daily and still had breakthrough DVT--> plan for coumadin vs lovenox   - should be on statin for secondary stroke preventoin  - f/u hematology.  was on heparin drip.  patient now may need to be on coumadin or lovenox; sees Dr. Varela outpatient --> recs appreicated   - f/u rheum  - has been following at [facility] as well and they also don't seem to have a diagnosis   - c/w keppra and vimpat for seiuzre ppx.   - consider MRI L spine   - check FS, glucose control <180  - GI/DVT ppx   - spoke iwth family friend via phone Winter Gallito   also spoke with aunt and uncle today 5/9  Braxton Forde MD  Vascular Neurology  Office: [phone]

## 2023-05-09 NOTE — SWALLOW VFSS/MBS ASSESSMENT ADULT - ADDITIONAL RECOMMENDATIONS
GOALS:  1. Pt will demonstrate understanding and carryover of safe swallow guidelines.   2. Pt will tolerate recommended diet with no overt, clinical s/s of aspiration.

## 2023-05-09 NOTE — SWALLOW VFSS/MBS ASSESSMENT ADULT - H & P REVIEW
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 Muir from 4/25/23--patient with a history of multiple TIA/ CVA apparently dating to 2011, 2013 (unclear to mechanism), with no interval episodes until Feb 2022, Aug 2022 and Nov 2022 with residual expressive aphasia maintained on apixaban, hyperlipidemia and apparently questionable ASD/ PFO with an admission to [facility] in Dec 2022 with patient noted unresponsive at home with patient noted to have blood in the mouth with scattered ecchymoses in the arms, face, chest with fever at the time, elevated BP and patient intubated for airway protection at the time with empiric antibiotics for presumed aspiration syndrome, with CTT head nondiagnostic other than old Ca++ mid alexis.../yes

## 2023-05-09 NOTE — SWALLOW VFSS/MBS ASSESSMENT ADULT - SLP GENERAL OBSERVATIONS
Pt was received in radiology suite in SUNDEEP chair. +room air. He was pleasant and cooperative. Able to follow all complex commands.

## 2023-05-09 NOTE — DISCHARGE NOTE PROVIDER - HOSPITAL COURSE
Patient is a 52 year old Male with a PMHx of TIA, CVA, chronic left DVT on Eliquis, previous compartment syndrome, S/P fasciotomy in Feb 2023, HLD, PFO, MIMI who was sent in by his outpatient rheumatologist Dr. Muir for a newly discovered Right LE DVT on outpatient US 05/04 in the setting of elevated cryoglobulins. Patient has been adherent to his Eliquis regimen. Fasciotomy scars are healing well with some serosanguinous discharge. Patient admits to globulus sensation with PO intake, Patient denies any leg pain, swelling, chest pain, shortness of breath, fevers, chills.     Deep venous thrombosis  - Pt with chronic LLE DVT, found to have RLE DVT while on ASA and Eliquis for AP/AC.   - Reported to have + cryoglobulin on outpatient assay with workup reportedly nondiagnostic per patient  - Pt follows with [facility], patient reports previous hypercoag work up negative per patient. [facility], Heme/Onc consulted; F/u recs   - Hold Eliquis. ? Eliquis failure --> Transitioned to Lovenox 1.5 dose (150 Mg Qd AC)  - Rheum eval appreciated; F/u recs --> Repeat Cryoglobulinemia work up underway   - F/u MR Angio as noted; F/u neuro recs   - Discussed with attending. C/w Lovenox 150 Qd for AC Q24 hours     Globus sensation.  - CT Head without significant change from 01/18; Microvascular ischemic changes in the periventricular and subcortical white matter   - Speech and swallow eval   - Started on Pureed diet pending S+S recs --> Planned for MBS 05/09 AM per speech department   - Aspiration precautions  - IVF for hydration     Cerebrovascular disease.  - H/O prior CVA --> On ASA and Statin   - F/u MR Angio as per Rheum   - Neuro eval consulted; F/u recs   - CTH stable     H/O Seizure  - C/w Keppra and Vimpat  - neuro follow up     History of fasciotomy.  - Dressings per Vacular/ Wound care   - Wound care eval   - Vascular surgery eval       PPX  - Transitioned to Lovenox 150 Mg Qd from 05/06     Discussed in detail with patient, family at the bedside and Winter via telephone. All questions answered.       Discharge/Dispo/Med rec discussed with attending. Patient medically cleared for discharge with outpatient follow up with PCP Patient is a 52 year old Male with a PMHx of TIA, CVA, chronic left DVT on Eliquis, previous compartment syndrome, S/P fasciotomy in Feb 2023, HLD, PFO, MIMI who was sent in by his outpatient rheumatologist Dr. Muir for a newly discovered Right LE DVT on outpatient US 05/04 in the setting of elevated cryoglobulins. Patient has been adherent to his Eliquis regimen. Fasciotomy scars are healing well with some serosanguinous discharge. Patient admits to globulus sensation with PO intake, Patient denies any leg pain, swelling, chest pain, shortness of breath, fevers, chills.     Deep venous thrombosis  - Pt with chronic LLE DVT, found to have RLE DVT while on ASA and Eliquis for AP/AC.   - Reported to have + cryoglobulin on outpatient assay with workup reportedly nondiagnostic per patient  - Pt follows with [facility], patient reports previous hypercoag work up negative per patient. [facility], Heme/Onc consulted; F/u recs   - Hold Eliquis. ? Eliquis failure --> Transitioned to Lovenox 1.5 dose (150 Mg Qd AC)  - Rheum eval appreciated; F/u recs --> Repeat Cryoglobulinemia work up underway   - F/u MR Angio as noted; F/u neuro recs   - Discussed with attending. C/w Lovenox 150 Qd for AC Q24 hours     Globus sensation.  - CT Head without significant change from 01/18; Microvascular ischemic changes in the periventricular and subcortical white matter   - Speech and swallow eval   - Started on Pureed diet pending S+S recs --> Planned for MBS 05/09 AM per speech department   - Aspiration precautions  - IVF for hydration     Cerebrovascular disease.  - H/O prior CVA --> On ASA and Statin   - F/u MR Angio as per Rheum   - Neuro eval consulted; F/u recs   - CTH stable     H/O Seizure  - C/w Keppra and Vimpat  - neuro follow up     History of fasciotomy.  - Dressings per Vacular/ Wound care   - Wound care eval   - Vascular surgery eval       PPX  - Transitioned to Lovenox 150 Mg Qd from 05/06     Discussed in detail with patient, family at the bedside and Winter via telephone. All questions answered. disch      Discharge/Dispo/Med rec discussed with attending. Patient medically cleared for discharge with outpatient follow up with PCP

## 2023-05-09 NOTE — DISCHARGE NOTE PROVIDER - NSDCFUSCHEDAPPT_GEN_ALL_CORE_FT
[facility] Physician [facility]  CATSCAN [address]  Scheduled Appointment: 05/15/2023    Luis Alfredo Elder  [facility]  ENDOCRIN [address]  Scheduled Appointment: 05/17/2023

## 2023-05-10 ENCOUNTER — TRANSCRIPTION ENCOUNTER (OUTPATIENT)
Age: 53
End: 2023-05-10

## 2023-05-10 VITALS
RESPIRATION RATE: 18 BRPM | HEART RATE: 67 BPM | OXYGEN SATURATION: 98 % | SYSTOLIC BLOOD PRESSURE: 104 MMHG | TEMPERATURE: 98 F | DIASTOLIC BLOOD PRESSURE: 68 MMHG

## 2023-05-10 LAB — SARS-COV-2 RNA SPEC QL NAA+PROBE: SIGNIFICANT CHANGE UP

## 2023-05-10 PROCEDURE — 80048 BASIC METABOLIC PNL TOTAL CA: CPT

## 2023-05-10 PROCEDURE — 97161 PT EVAL LOW COMPLEX 20 MIN: CPT

## 2023-05-10 PROCEDURE — 84156 ASSAY OF PROTEIN URINE: CPT

## 2023-05-10 PROCEDURE — 74230 X-RAY XM SWLNG FUNCJ C+: CPT

## 2023-05-10 PROCEDURE — 70544 MR ANGIOGRAPHY HEAD W/O DYE: CPT

## 2023-05-10 PROCEDURE — 84165 PROTEIN E-PHORESIS SERUM: CPT

## 2023-05-10 PROCEDURE — 86160 COMPLEMENT ANTIGEN: CPT

## 2023-05-10 PROCEDURE — 97110 THERAPEUTIC EXERCISES: CPT

## 2023-05-10 PROCEDURE — 36415 COLL VENOUS BLD VENIPUNCTURE: CPT

## 2023-05-10 PROCEDURE — 84166 PROTEIN E-PHORESIS/URINE/CSF: CPT

## 2023-05-10 PROCEDURE — 70450 CT HEAD/BRAIN W/O DYE: CPT

## 2023-05-10 PROCEDURE — 87389 HIV-1 AG W/HIV-1&-2 AB AG IA: CPT

## 2023-05-10 PROCEDURE — 96374 THER/PROPH/DIAG INJ IV PUSH: CPT

## 2023-05-10 PROCEDURE — 80053 COMPREHEN METABOLIC PANEL: CPT

## 2023-05-10 PROCEDURE — 86704 HEP B CORE ANTIBODY TOTAL: CPT

## 2023-05-10 PROCEDURE — U0003: CPT

## 2023-05-10 PROCEDURE — 71045 X-RAY EXAM CHEST 1 VIEW: CPT

## 2023-05-10 PROCEDURE — 82595 ASSAY OF CRYOGLOBULIN: CPT

## 2023-05-10 PROCEDURE — U0005: CPT

## 2023-05-10 PROCEDURE — 99285 EMERGENCY DEPT VISIT HI MDM: CPT

## 2023-05-10 PROCEDURE — 85610 PROTHROMBIN TIME: CPT

## 2023-05-10 PROCEDURE — 92610 EVALUATE SWALLOWING FUNCTION: CPT

## 2023-05-10 PROCEDURE — 82962 GLUCOSE BLOOD TEST: CPT

## 2023-05-10 PROCEDURE — 92611 MOTION FLUOROSCOPY/SWALLOW: CPT

## 2023-05-10 PROCEDURE — 97116 GAIT TRAINING THERAPY: CPT

## 2023-05-10 PROCEDURE — 86335 IMMUNFIX E-PHORSIS/URINE/CSF: CPT

## 2023-05-10 PROCEDURE — 85027 COMPLETE CBC AUTOMATED: CPT

## 2023-05-10 PROCEDURE — 85025 COMPLETE CBC W/AUTO DIFF WBC: CPT

## 2023-05-10 PROCEDURE — 85730 THROMBOPLASTIN TIME PARTIAL: CPT

## 2023-05-10 PROCEDURE — 80074 ACUTE HEPATITIS PANEL: CPT

## 2023-05-10 PROCEDURE — 84155 ASSAY OF PROTEIN SERUM: CPT

## 2023-05-10 PROCEDURE — 86334 IMMUNOFIX E-PHORESIS SERUM: CPT

## 2023-05-10 PROCEDURE — 86431 RHEUMATOID FACTOR QUANT: CPT

## 2023-05-10 PROCEDURE — 82585 ASSAY OF CRYOFIBRINOGEN: CPT

## 2023-05-10 RX ADMIN — ENOXAPARIN SODIUM 150 MILLIGRAM(S): 100 INJECTION SUBCUTANEOUS at 09:29

## 2023-05-10 RX ADMIN — PANTOPRAZOLE SODIUM 40 MILLIGRAM(S): 20 TABLET, DELAYED RELEASE ORAL at 05:24

## 2023-05-10 RX ADMIN — LACOSAMIDE 100 MILLIGRAM(S): 50 TABLET ORAL at 05:23

## 2023-05-10 RX ADMIN — Medication 1 TABLET(S): at 13:14

## 2023-05-10 RX ADMIN — LEVETIRACETAM 1500 MILLIGRAM(S): 250 TABLET, FILM COATED ORAL at 09:28

## 2023-05-10 RX ADMIN — GABAPENTIN 300 MILLIGRAM(S): 400 CAPSULE ORAL at 13:13

## 2023-05-10 RX ADMIN — Medication 81 MILLIGRAM(S): at 13:13

## 2023-05-10 RX ADMIN — GABAPENTIN 300 MILLIGRAM(S): 400 CAPSULE ORAL at 05:23

## 2023-05-10 NOTE — DISCHARGE NOTE NURSING/CASE MANAGEMENT/SOCIAL WORK - NSDCPEFALRISK_GEN_ALL_CORE
For information on Fall & Injury Prevention, visit: [email or web address] OR  [email or web address] OR  https://www.cdc.gov/steadi/patient.html

## 2023-05-10 NOTE — PROGRESS NOTE ADULT - REASON FOR ADMISSION
Sent to the ER for NEW RIGHT LE DVT on apixaban, globus sensation with food 2 days ago.

## 2023-05-10 NOTE — PROGRESS NOTE ADULT - ASSESSMENT
Patient is a 52 year old Male with a PMHx of TIA, CVA, chronic left DVT on Eliquis, previous compartment syndrome, S/P fasciotomy in Feb 2023, HLD, PFO, MIMI who was sent in by his outpatient rheumatologist Dr. Muir for a newly discovered Right LE DVT on outpatient US 05/04 in the setting of elevated cryoglobulins. Patient has been adherent to his Eliquis regimen. Fasciotomy scars are healing well with some serosanguinous discharge. Patient admits to globulus sensation with PO intake, Patient denies any leg pain, swelling, chest pain, shortness of breath, fevers, chills.     Deep venous thrombosis  - Pt with chronic LLE DVT, found to have RLE DVT while on ASA and Eliquis for AP/AC.   - Reported to have + cryoglobulin on outpatient assay with workup reportedly nondiagnostic per patient  - Pt follows with [facility], patient reports previous hypercoag work up negative per patient. [facility], Heme/Onc consulted; F/u recs   - Hold Eliquis. ? Eliquis failure --> Transitioned to Lovenox 1.5 dose (150 Mg Qd AC)  - Rheum eval appreciated; F/u recs --> Repeat Cryoglobulinemia work up reported to be negative 05/08 (chart note). Outpatient rheum F/u    - F/u MR Angio as noted; F/u neuro recs   - Discussed with attending. C/w Lovenox 150 Qd for AC Q24 hours; Patient agreeable. Lovenox teaching     Globus sensation.  - CT Head without significant change from 01/18; Microvascular ischemic changes in the periventricular and subcortical white matter --> On ASA   - Speech and swallow eval  - S/P IVF; PO hydration encouraged   - S/P MBS 05/09, on regular diet. Reports globulus sensation has subsided.    - Aspiration precautions    Cerebrovascular disease.  - H/O prior CVA --> On ASA and Statin   - F/u MR Angio as per Rheum / Neuro   - Neuro eval consulted; F/u recs   - CTH stable     H/O Seizure  - C/w Keppra and Vimpat  - neuro follow up     History of fasciotomy.  - Dressings per Vacular/ Wound care  - States he was seen by Dr. Smith 05/09.    - Wound care eval   - Vascular surgery eval       PPX  - Transitioned to Lovenox 150 Mg Qd from 05/06   Discussed in detail with patient, and family at the bedside. DC planning with outpatient follow up. Discussed with Attending.

## 2023-05-10 NOTE — PROGRESS NOTE ADULT - ASSESSMENT
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 Muir from 4/25/23--patient with a history of multiple TIA/ CVA apparently dating to 2011, 2013 (unclear to mechanism), with no interval episodes until Feb 2022, Aug 2022 and Nov 2022 with residual expressive aphasia maintained on apixaban, hyperlipidemia and apparently questionable ASD/ PFO with an admission to [facility] in Dec 2022 with patient noted unresponsive at home with patient noted to have blood in the mouth with scattered ecchymoses in the arms, face, chest with fever at the time, elevated BP and patient intubated for airway protection at the time with empiric antibiotics for presumed aspiration syndrome, with CTT head nondiagnostic other than old Ca++ mid alexis, EEG nondiagnostic, echo at the time with NO PFO, with an event on 12/5/22 with a Rapid Response call with tongue laceration and posturing concern for status epilepticus w/ patient started on Keppra, MIMI with small PFO noted.    EEG with seizures captured.  LP for fever negative for paraneoplastic encephalitis.   Patient was extubated on 12/8/22.  MRI brain with B/L lentiform nucleus and pontine enhancement, but with NS did not recommend tissue sampling.  Patient was readmitted to [facility] on 1/18/23 following delirium at the rehab with patient's parents noting patient to have confusion and word searching difficulty, patient underwent an emergent LEFT LE fasciotomy following presentation with compartment syndrome, chronic LEFT foot drop, with short course of IV Zosyn and avoidance of LEFT BKA,     multiple testing for LAC/B2GPI/aCL/atypical APLS - negative/ extensive thrombophilia hematol evaluation - negative  Positive: cryoglobulins with cryocrit 34 with negative: SHAY/dsDNA/Sm/MPO/PR3/C3/C4/RF/CCP/Sjogrens/scleroderma/centromere/IgG4/ACE  MRA H with mild dolichoectasia R VA to prox BA; L VA hypoplastic     Impression:   patient previously was on testosterone supplements and was though his prior embolic strokes 2/2 hormonal supplement.   he had result seizures     - PT   - patient has been on Eliquis 5mg BID and ASA 81mg daily and still had breakthrough DVT--> plan for coumadin vs lovenox   - should be on statin for secondary stroke preventoin  - f/u hematology.  was on heparin drip.  patient now may need to be on coumadin or lovenox; sees Dr. Varela outpatient --> recs appreicated   - f/u rheum  - has been following at [facility] as well and they also don't seem to have a diagnosis   - c/w keppra and vimpat for seiuzre ppx.   - consider MRI L spine   - check FS, glucose control <180  - GI/DVT ppx   - spoke iwth family friend via phone Winter Gallito   also spoke with aunt and uncle  5/9  dc planing   Braxton Forde MD  Vascular Neurology  Office: [phone]

## 2023-05-10 NOTE — PROGRESS NOTE ADULT - SUBJECTIVE AND OBJECTIVE BOX
Neurology     S: dc planninbg     Medications:   MEDICATIONS  (STANDING):  aspirin enteric coated 81 milliGRAM(s) Oral daily  atorvastatin 40 milliGRAM(s) Oral at bedtime  dextrose 5% + sodium chloride 0.9%. 1000 milliLiter(s) (60 mL/Hr) IV Continuous <Continuous>  dextrose 5%. 1000 milliLiter(s) (50 mL/Hr) IV Continuous <Continuous>  dextrose 5%. 1000 milliLiter(s) (100 mL/Hr) IV Continuous <Continuous>  dextrose 50% Injectable 25 Gram(s) IV Push once  dextrose 50% Injectable 25 Gram(s) IV Push once  dextrose 50% Injectable 12.5 Gram(s) IV Push once  enoxaparin Injectable 150 milliGRAM(s) SubCutaneous every 24 hours  gabapentin 300 milliGRAM(s) Oral three times a day  glucagon  Injectable 1 milliGRAM(s) IntraMuscular once  lacosamide 100 milliGRAM(s) Oral two times a day  levETIRAcetam 1500 milliGRAM(s) Oral <User Schedule>  multivitamin 1 Tablet(s) Oral daily  pantoprazole    Tablet 40 milliGRAM(s) Oral before breakfast    MEDICATIONS  (PRN):  acetaminophen     Tablet .. 650 milliGRAM(s) Oral every 6 hours PRN Temp greater or equal to 38C (100.4F), Mild Pain (1 - 3)  dextrose Oral Gel 15 Gram(s) Oral once PRN Blood Glucose LESS THAN 70 milliGRAM(s)/deciliter  oxyCODONE    IR 5 milliGRAM(s) Oral every 6 hours PRN Moderate Pain (4 - 6)       Vitals:  Vital Signs Last 24 Hrs  T(C): 36.6 (05-10-23 @ 14:56), Max: 36.6 (05-10-23 @ 00:17)  T(F): 97.8 (05-10-23 @ 14:56), Max: 97.8 (05-10-23 @ 00:17)  HR: 67 (05-10-23 @ 14:56) (66 - 70)  BP: 104/68 (05-10-23 @ 14:56) (101/65 - 111/70)  BP(mean): --  RR: 18 (05-10-23 @ 14:56) (18 - 18)  SpO2: 98% (05-10-23 @ 14:56) (95% - 98%)        General Exam:   General Appearance: Appropriately dressed and in no acute distress       Head: Normocephalic, atraumatic and no dysmorphic features  Ear, Nose, and Throat: Moist mucous membranes  CVS: S1S2+  Resp: No SOB, no wheeze or rhonchi  GI: soft NT/ND  Extremities: No edema or cyanosis  Skin: No bruises or rashes     Neurological Exam:  Mental Status: Awake, alert and oriented x 3.  Able to follow simple and complex verbal commands. Able to name and repeat. fluent speech. No obvious aphasia or dysarthria noted.   Cranial Nerves: PERRL, EOMI, VFFC, sensation V1-V3 intact,  no obvious facial asymmetry, equal elevation of palate, scm/trap 5/5, tongue is midline on protrusion. no obvious papilledema on fundoscopic exam. hearing is grossly intact.   Motor: Normal bulk, tone and strength throughout L foot drop   Sensation: Intact to light touch and pinprick throughout. no right/left confusion. no extinction to tactile on DSS. Romberg was negative.   Reflexes: 1+ throughout at biceps, brachioradialis, triceps, patellars and ankles bilaterally and equal. No clonus. R toe and L toe were both downgoing.  Coordination: No dysmetria on FNF or HKS  Gait:  walker     Data/Labs/Imaging which I personally reviewed.     Labs:      no new labs   < from: MR Angio Head No Cont (05.06.23 @ 22:44) >    ACC: [id number] EXAM:  MR ANGIO BRAIN   ORDERED BY: JUNIOR WINSTON     PROCEDURE DATE:  05/06/2023          INTERPRETATION:  CLINICAL INDICATIONS:mild dolichoectasia    TECHNIQUE: MRA brain. 3-D time of flight imaging with 2D MIP   reconstructions.    COMPARISON: MRA brain dated 4/26/2023    FINDINGS:  Borderline to mild dolichoectasia right vertebral artery extending to the   proximal basilar artery. Left vertebral artery is hypoplastic in   comparison.  The [place] and vertebrobasilar system are otherwise   unremarkable without evidence of stenosis, occlusion or saccular aneurysm   dilation. No evidence for arterial venous malformation. The vertebral   arteries are codominant.    IMPRESSION: No interval change.    --- End of Report ---            GIRMA HOYT MD; Attending Radiologist  This document has been electronically signed. May  7 2023 10:34AM    < end of copied text >

## 2023-05-10 NOTE — PROGRESS NOTE ADULT - PROVIDER SPECIALTY LIST ADULT
Internal Medicine
Neurology
Internal Medicine
Neurology
Internal Medicine
Internal Medicine
Neurology
Internal Medicine
Internal Medicine

## 2023-05-10 NOTE — PROGRESS NOTE ADULT - SUBJECTIVE AND OBJECTIVE BOX
Name of Patient : TAMI DUNHAM  MRN: [id number]  Date of visit: 05-10-23 @ 12:49      Subjective: Patient seen and examined. No new events except as noted.   Patient seen earlier this AM. Family at the bedside.   Reports that he was seen by Dr. Smith yesterday  DC planning    REVIEW OF SYSTEMS:    CONSTITUTIONAL: No weakness, fevers or chills  EYES/ENT: No visual changes;  No vertigo or throat pain   NECK: No pain or stiffness  RESPIRATORY: No cough, wheezing, hemoptysis; No shortness of breath  CARDIOVASCULAR: No chest pain or palpitations  GASTROINTESTINAL: No abdominal or epigastric pain. No nausea, vomiting, or hematemesis; No diarrhea or constipation. No melena or hematochezia.  GENITOURINARY: No dysuria, frequency or hematuria  NEUROLOGICAL: No numbness or weakness  SKIN: LLE dressing   All other review of systems is negative unless indicated above.    MEDICATIONS:  MEDICATIONS  (STANDING):  aspirin enteric coated 81 milliGRAM(s) Oral daily  atorvastatin 40 milliGRAM(s) Oral at bedtime  dextrose 5%. 1000 milliLiter(s) (50 mL/Hr) IV Continuous <Continuous>  dextrose 5%. 1000 milliLiter(s) (100 mL/Hr) IV Continuous <Continuous>  dextrose 50% Injectable 12.5 Gram(s) IV Push once  dextrose 50% Injectable 25 Gram(s) IV Push once  dextrose 50% Injectable 25 Gram(s) IV Push once  enoxaparin Injectable 150 milliGRAM(s) SubCutaneous every 24 hours  gabapentin 300 milliGRAM(s) Oral three times a day  glucagon  Injectable 1 milliGRAM(s) IntraMuscular once  lacosamide 100 milliGRAM(s) Oral two times a day  levETIRAcetam 1500 milliGRAM(s) Oral <User Schedule>  multivitamin 1 Tablet(s) Oral daily  pantoprazole    Tablet 40 milliGRAM(s) Oral before breakfast      PHYSICAL EXAM:  T(C): 36.4 (05-10-23 @ 12:21), Max: 36.6 (05-10-23 @ 00:17)  HR: 66 (05-10-23 @ 12:21) (66 - 70)  BP: 106/69 (05-10-23 @ 12:21) (101/65 - 111/70)  RR: 18 (05-10-23 @ 12:21) (18 - 18)  SpO2: 96% (05-10-23 @ 12:21) (95% - 97%)  Wt(kg): --  I&O's Summary    09 May 2023 07:01  -  10 May 2023 07:00  --------------------------------------------------------  IN: 720 mL / OUT: 0 mL / NET: 720 mL          Appearance: Awake, Sitting up in bed   HEENT: Eyes are open    Lymphatic: No lymphadenopathy   Cardiovascular: Normal    Respiratory: normal effort , clear  Gastrointestinal:  Soft, Non-tender  Skin: No rashes,  warm to touch  Psychiatry:  Mood & affect appropriate  Musculoskeletal: LLE wrapped in ACE Wrap        05-09-23 @ 07:01  -  05-10-23 @ 07:00  --------------------------------------------------------  IN: 720 mL / OUT: 0 mL / NET: 720 mL

## 2023-05-10 NOTE — DISCHARGE NOTE NURSING/CASE MANAGEMENT/SOCIAL WORK - PATIENT PORTAL LINK FT
You can access the FollowMyHealth Patient Portal offered by [facility] by registering at the following website: [email or web address]. By joining [employer]’s FollowMyHealth portal, you will also be able to view your health information using other applications (apps) compatible with our system.

## 2023-05-11 LAB — INTERPRETATION SERPL IFE-IMP: SIGNIFICANT CHANGE UP

## 2023-05-12 LAB
% GAMMA, URINE: 15.9 % — SIGNIFICANT CHANGE UP
ALBUMIN 24H MFR UR ELPH: 23.6 % — SIGNIFICANT CHANGE UP
ALPHA1 GLOB 24H MFR UR ELPH: 30.9 % — SIGNIFICANT CHANGE UP
ALPHA2 GLOB 24H MFR UR ELPH: 17.2 % — SIGNIFICANT CHANGE UP
B-GLOBULIN 24H MFR UR ELPH: 12.4 % — SIGNIFICANT CHANGE UP
COLLECT DURATION TIME UR: 24 HR — SIGNIFICANT CHANGE UP
INTERPRETATION 24H UR IFE-IMP: SIGNIFICANT CHANGE UP
INTERPRETATION 24H UR IFE-IMP: SIGNIFICANT CHANGE UP
M PROTEIN 24H UR ELPH-MRATE: 0 % — SIGNIFICANT CHANGE UP
M PROTEIN 24H UR ELPH-MRATE: 0 MG/24HR — SIGNIFICANT CHANGE UP (ref 0–0)
M PROTEIN 24H UR ELPH-MRATE: 0 MG/DL — SIGNIFICANT CHANGE UP
PROT PATTERN 24H UR ELPH-IMP: SIGNIFICANT CHANGE UP
PROTEIN QUANT CALC, URINE: 133 MG/24 H — HIGH (ref 50–100)
TOTAL VOLUME - 24 HOUR: 3325 ML — SIGNIFICANT CHANGE UP
URINE CREATININE CALCULATION: 1.7 G/24 H — SIGNIFICANT CHANGE UP (ref 1–2)

## 2023-05-15 ENCOUNTER — OUTPATIENT (OUTPATIENT)
Dept: OUTPATIENT SERVICES | Facility: HOSPITAL | Age: 53
LOS: 1 days | End: 2023-05-15
Payer: MEDICAID

## 2023-05-15 ENCOUNTER — APPOINTMENT (OUTPATIENT)
Dept: CT IMAGING | Facility: CLINIC | Age: 53
End: 2023-05-15
Payer: MEDICAID

## 2023-05-15 DIAGNOSIS — Z98.890 OTHER SPECIFIED POSTPROCEDURAL STATES: Chronic | ICD-10-CM

## 2023-05-15 DIAGNOSIS — I63.9 CEREBRAL INFARCTION, UNSPECIFIED: ICD-10-CM

## 2023-05-15 DIAGNOSIS — Q21.10 ATRIAL SEPTAL DEFECT, UNSPECIFIED: ICD-10-CM

## 2023-05-15 PROCEDURE — 75573 CT HRT C+ STRUX CGEN HRT DS: CPT | Mod: 26

## 2023-05-15 PROCEDURE — 75573 CT HRT C+ STRUX CGEN HRT DS: CPT

## 2023-05-17 ENCOUNTER — APPOINTMENT (OUTPATIENT)
Dept: ENDOCRINOLOGY | Facility: CLINIC | Age: 53
End: 2023-05-17
Payer: MEDICAID

## 2023-05-17 VITALS
HEIGHT: 75 IN | SYSTOLIC BLOOD PRESSURE: 118 MMHG | WEIGHT: 224.38 LBS | OXYGEN SATURATION: 97 % | BODY MASS INDEX: 27.9 KG/M2 | HEART RATE: 76 BPM | DIASTOLIC BLOOD PRESSURE: 83 MMHG

## 2023-05-17 DIAGNOSIS — G45.9 TRANSIENT CEREBRAL ISCHEMIC ATTACK, UNSPECIFIED: ICD-10-CM

## 2023-05-17 DIAGNOSIS — I63.9 CEREBRAL INFARCTION, UNSPECIFIED: ICD-10-CM

## 2023-05-17 DIAGNOSIS — Q21.1 ATRIAL SEPTAL DEFECT: ICD-10-CM

## 2023-05-17 DIAGNOSIS — E78.5 HYPERLIPIDEMIA, UNSPECIFIED: ICD-10-CM

## 2023-05-17 DIAGNOSIS — R53.82 CHRONIC FATIGUE, UNSPECIFIED: ICD-10-CM

## 2023-05-17 DIAGNOSIS — Z00.00 ENCOUNTER FOR GENERAL ADULT MEDICAL EXAMINATION W/OUT ABNORMAL FINDINGS: ICD-10-CM

## 2023-05-17 PROCEDURE — 99204 OFFICE O/P NEW MOD 45 MIN: CPT | Mod: 25

## 2023-05-23 LAB
25(OH)D3 SERPL-MCNC: 30.2 NG/ML
ALBUMIN SERPL ELPH-MCNC: 4.1 G/DL
ALP BLD-CCNC: 82 U/L
ALT SERPL-CCNC: 19 U/L
ANION GAP SERPL CALC-SCNC: 12 MMOL/L
AST SERPL-CCNC: 12 U/L
BILIRUB SERPL-MCNC: 0.2 MG/DL
BUN SERPL-MCNC: 15 MG/DL
CALCIUM SERPL-MCNC: 8.9 MG/DL
CHLORIDE SERPL-SCNC: 108 MMOL/L
CO2 SERPL-SCNC: 25 MMOL/L
CORTIS SERPL-MCNC: 8 UG/DL
CREAT SERPL-MCNC: 0.98 MG/DL
EGFR: 93 ML/MIN/1.73M2
FERRITIN SERPL-MCNC: 72 NG/ML
FOLATE SERPL-MCNC: 18.7 NG/ML
GLUCOSE SERPL-MCNC: 104 MG/DL
IRON SERPL-MCNC: 65 UG/DL
MAGNESIUM RBC-MCNC: 5.1 MG/DL
MAGNESIUM SERPL-MCNC: 2.2 MG/DL
POTASSIUM SERPL-SCNC: 4.5 MMOL/L
PROT SERPL-MCNC: 6.8 G/DL
SODIUM SERPL-SCNC: 145 MMOL/L
T3FREE SERPL-MCNC: 2.93 PG/ML
T4 FREE SERPL-MCNC: 1 NG/DL
THYROGLOB AB SERPL-ACNC: <20 IU/ML
THYROPEROXIDASE AB SERPL IA-ACNC: 45.1 IU/ML
TSH RECEPTOR AB: <1.1 IU/L
TSH SERPL-ACNC: 2.82 UIU/ML
TSI ACT/NOR SER: <0.1 IU/L
VIT B12 SERPL-MCNC: 668 PG/ML

## 2023-05-23 NOTE — HISTORY OF PRESENT ILLNESS
[FreeTextEntry1] : Pt presents here today to discuss LLE extremity wound.  Pt had a CVA and a seizure of 12/2/22.  He was evaluated at [facility] and eventually discharged to [facility].  He developed LLE swelling and was sent back to [facility] ED for a doppler which confirmed a DVT.  He eventually needed a LLE fasciotomy on 1/18/23 and then was subsequently taken back on 1/23/23 for debridement and washout.  Since then he was dc'd to [facility] rehab and has been receiving daily wound care.  He had a vac placed which was removed several weeks ago.  He was on Lovenox but was switched to Eliquis.  He has mild pain and numbness from his left calf down to his toes.  He has difficulty walking due to a dropped foot left foot, but states his drop foot has been improving and his walking is improving.  He undergoes PT.  Pt here to discuss if the wound needs a skin graft.

## 2023-05-23 NOTE — PHYSICAL EXAM
[NI] : Normal [de-identified] : left leg with fasciotomy wound [de-identified] : Left lower extremity fasciotomy sites clean granulating healing well  [de-identified] : neuropathy and foot drop

## 2023-05-23 NOTE — REASON FOR VISIT
[Initial Evaluation] : an initial evaluation [Friend] : friend [Parent] : parent [FreeTextEntry1] : 51 y/o M w/ hx of CVA had a seizure on 12/2/22. He was evaluated at [facility] then discharged to [facility] rehab. He had onset of LLE swelling and was sent to [facility] ED for doppler, which confirmed DVT on 1/17/23. He underwent LLE fasciotomy on 1/18/23 with Dr. Mohsen Bannazadeh at [facility] and was subsequently taken back on 1/23/23 for debridement and washout. Since, he's been d/c to [facility] rehab and has been receiving wound care daily w/ Dr. Huang. He had a wound vac, which was removed several weeks ago. He was receiving Lovenox injections but is now taking Eliquis 5mg BID. He reports mild pain, numbness from his left calf down to his toe, drainage, and difficulty walking. He has been ambulating with a walker. He is here today to discuss whether he would be a candidate for a skin graft.

## 2023-05-23 NOTE — REVIEW OF SYSTEMS
[Fever] : no fever [Chills] : no chills [Negative] : Heme/Lymph [FreeTextEntry2] : ambulates with a walker [FreeTextEntry9] : Left lower extremity fasciotomy sites, + left foot drop [de-identified] : wound on left leg as above from fasciotomies

## 2023-05-24 LAB
VIT B1 SERPL-MCNC: 149.9 NMOL/L
VIT B2 SERPL-MCNC: 185 UG/L

## 2023-05-25 LAB — UBIQUINONE10 SERPL-MCNC: 0.5 UG/ML

## 2023-05-25 NOTE — OCCUPATIONAL THERAPY INITIAL EVALUATION ADULT - LEVEL OF CONSCIOUSNESS, OT EVAL
1) Recommend liberalize diet to no DASH/TLC restriction as pt already on Renal Replacement low sodium diet.   2) Recommend add Nepro 1x daily (425 kcals, 19.1g protein) to provide optimal nutrition.  3) Recommend consider change MVI to Nephro-nathanael for micronutrient coverage.    4) Monitor PO intake, Labs, weights, BMs, and skin integrity.   5) RD to remain available for further nutritional interventions as indicated. 
alert

## 2023-05-26 ENCOUNTER — EMERGENCY (EMERGENCY)
Facility: HOSPITAL | Age: 53
LOS: 1 days | Discharge: HOME | End: 2023-05-26
Attending: EMERGENCY MEDICINE
Payer: MEDICAID

## 2023-05-26 VITALS
DIASTOLIC BLOOD PRESSURE: 82 MMHG | OXYGEN SATURATION: 96 % | SYSTOLIC BLOOD PRESSURE: 118 MMHG | RESPIRATION RATE: 18 BRPM | HEART RATE: 76 BPM

## 2023-05-26 VITALS
OXYGEN SATURATION: 97 % | DIASTOLIC BLOOD PRESSURE: 78 MMHG | HEART RATE: 79 BPM | HEIGHT: 75 IN | RESPIRATION RATE: 18 BRPM | SYSTOLIC BLOOD PRESSURE: 141 MMHG | WEIGHT: 220.02 LBS | TEMPERATURE: 98 F

## 2023-05-26 DIAGNOSIS — Z98.890 OTHER SPECIFIED POSTPROCEDURAL STATES: Chronic | ICD-10-CM

## 2023-05-26 LAB
ALBUMIN SERPL ELPH-MCNC: 4.2 G/DL — SIGNIFICANT CHANGE UP (ref 3.3–5)
ALP SERPL-CCNC: 79 U/L — SIGNIFICANT CHANGE UP (ref 40–120)
ALT FLD-CCNC: 23 U/L — SIGNIFICANT CHANGE UP (ref 10–45)
ANION GAP SERPL CALC-SCNC: 15 MMOL/L — SIGNIFICANT CHANGE UP (ref 5–17)
APTT BLD: 44 SEC — HIGH (ref 27.5–35.5)
AST SERPL-CCNC: 17 U/L — SIGNIFICANT CHANGE UP (ref 10–40)
BASOPHILS # BLD AUTO: 0.07 K/UL — SIGNIFICANT CHANGE UP (ref 0–0.2)
BASOPHILS NFR BLD AUTO: 1 % — SIGNIFICANT CHANGE UP (ref 0–2)
BILIRUB SERPL-MCNC: 0.2 MG/DL — SIGNIFICANT CHANGE UP (ref 0.2–1.2)
BUN SERPL-MCNC: 15 MG/DL — SIGNIFICANT CHANGE UP (ref 7–23)
CALCIUM SERPL-MCNC: 8.6 MG/DL — SIGNIFICANT CHANGE UP (ref 8.4–10.5)
CHLORIDE SERPL-SCNC: 104 MMOL/L — SIGNIFICANT CHANGE UP (ref 96–108)
CO2 SERPL-SCNC: 22 MMOL/L — SIGNIFICANT CHANGE UP (ref 22–31)
CORTICOSTEROID BIND GLOBULIN: 1.6 MG/DL
CREAT SERPL-MCNC: 0.99 MG/DL — SIGNIFICANT CHANGE UP (ref 0.5–1.3)
EGFR: 92 ML/MIN/1.73M2 — SIGNIFICANT CHANGE UP
EOSINOPHIL # BLD AUTO: 0.5 K/UL — SIGNIFICANT CHANGE UP (ref 0–0.5)
EOSINOPHIL NFR BLD AUTO: 7.2 % — HIGH (ref 0–6)
GLUCOSE SERPL-MCNC: 82 MG/DL — SIGNIFICANT CHANGE UP (ref 70–99)
HCT VFR BLD CALC: 40.4 % — SIGNIFICANT CHANGE UP (ref 39–50)
HGB BLD-MCNC: 13.2 G/DL — SIGNIFICANT CHANGE UP (ref 13–17)
IMM GRANULOCYTES NFR BLD AUTO: 0.7 % — SIGNIFICANT CHANGE UP (ref 0–0.9)
INR BLD: 0.96 RATIO — SIGNIFICANT CHANGE UP (ref 0.88–1.16)
LYMPHOCYTES # BLD AUTO: 1.52 K/UL — SIGNIFICANT CHANGE UP (ref 1–3.3)
LYMPHOCYTES # BLD AUTO: 21.9 % — SIGNIFICANT CHANGE UP (ref 13–44)
MCHC RBC-ENTMCNC: 28.7 PG — SIGNIFICANT CHANGE UP (ref 27–34)
MCHC RBC-ENTMCNC: 32.7 GM/DL — SIGNIFICANT CHANGE UP (ref 32–36)
MCV RBC AUTO: 87.8 FL — SIGNIFICANT CHANGE UP (ref 80–100)
MONOCYTES # BLD AUTO: 0.71 K/UL — SIGNIFICANT CHANGE UP (ref 0–0.9)
MONOCYTES NFR BLD AUTO: 10.2 % — SIGNIFICANT CHANGE UP (ref 2–14)
NEUTROPHILS # BLD AUTO: 4.08 K/UL — SIGNIFICANT CHANGE UP (ref 1.8–7.4)
NEUTROPHILS NFR BLD AUTO: 59 % — SIGNIFICANT CHANGE UP (ref 43–77)
NRBC # BLD: 0 /100 WBCS — SIGNIFICANT CHANGE UP (ref 0–0)
PLATELET # BLD AUTO: 202 K/UL — SIGNIFICANT CHANGE UP (ref 150–400)
POTASSIUM SERPL-MCNC: 3.8 MMOL/L — SIGNIFICANT CHANGE UP (ref 3.5–5.3)
POTASSIUM SERPL-SCNC: 3.8 MMOL/L — SIGNIFICANT CHANGE UP (ref 3.5–5.3)
PROT SERPL-MCNC: 7.5 G/DL — SIGNIFICANT CHANGE UP (ref 6–8.3)
PROTHROM AB SERPL-ACNC: 11 SEC — SIGNIFICANT CHANGE UP (ref 10.5–13.4)
RBC # BLD: 4.6 M/UL — SIGNIFICANT CHANGE UP (ref 4.2–5.8)
RBC # FLD: 14.3 % — SIGNIFICANT CHANGE UP (ref 10.3–14.5)
SODIUM SERPL-SCNC: 141 MMOL/L — SIGNIFICANT CHANGE UP (ref 135–145)
WBC # BLD: 6.93 K/UL — SIGNIFICANT CHANGE UP (ref 3.8–10.5)
WBC # FLD AUTO: 6.93 K/UL — SIGNIFICANT CHANGE UP (ref 3.8–10.5)

## 2023-05-26 PROCEDURE — 70450 CT HEAD/BRAIN W/O DYE: CPT | Mod: MA

## 2023-05-26 PROCEDURE — 93005 ELECTROCARDIOGRAM TRACING: CPT

## 2023-05-26 PROCEDURE — 70498 CT ANGIOGRAPHY NECK: CPT | Mod: 26,MA

## 2023-05-26 PROCEDURE — 70496 CT ANGIOGRAPHY HEAD: CPT | Mod: MA

## 2023-05-26 PROCEDURE — 80053 COMPREHEN METABOLIC PANEL: CPT

## 2023-05-26 PROCEDURE — 36415 COLL VENOUS BLD VENIPUNCTURE: CPT

## 2023-05-26 PROCEDURE — 85730 THROMBOPLASTIN TIME PARTIAL: CPT

## 2023-05-26 PROCEDURE — 99285 EMERGENCY DEPT VISIT HI MDM: CPT | Mod: 25

## 2023-05-26 PROCEDURE — 85610 PROTHROMBIN TIME: CPT

## 2023-05-26 PROCEDURE — 85025 COMPLETE CBC W/AUTO DIFF WBC: CPT

## 2023-05-26 PROCEDURE — 99285 EMERGENCY DEPT VISIT HI MDM: CPT

## 2023-05-26 PROCEDURE — 70496 CT ANGIOGRAPHY HEAD: CPT | Mod: 26,MA

## 2023-05-26 PROCEDURE — 70498 CT ANGIOGRAPHY NECK: CPT | Mod: MA

## 2023-05-26 RX ORDER — LEVETIRACETAM 250 MG/1
750 TABLET, FILM COATED ORAL ONCE
Refills: 0 | Status: COMPLETED | OUTPATIENT
Start: 2023-05-26 | End: 2023-05-26

## 2023-05-26 RX ORDER — GABAPENTIN 400 MG/1
300 CAPSULE ORAL ONCE
Refills: 0 | Status: COMPLETED | OUTPATIENT
Start: 2023-05-26 | End: 2023-05-26

## 2023-05-26 RX ORDER — LACOSAMIDE 50 MG/1
100 TABLET ORAL ONCE
Refills: 0 | Status: DISCONTINUED | OUTPATIENT
Start: 2023-05-26 | End: 2023-05-26

## 2023-05-26 RX ORDER — MECLIZINE HCL 12.5 MG
25 TABLET ORAL ONCE
Refills: 0 | Status: COMPLETED | OUTPATIENT
Start: 2023-05-26 | End: 2023-05-26

## 2023-05-26 RX ORDER — MECLIZINE HCL 12.5 MG
1 TABLET ORAL
Qty: 30 | Refills: 0
Start: 2023-05-26 | End: 2023-06-04

## 2023-05-26 RX ADMIN — LACOSAMIDE 100 MILLIGRAM(S): 50 TABLET ORAL at 21:00

## 2023-05-26 RX ADMIN — GABAPENTIN 300 MILLIGRAM(S): 400 CAPSULE ORAL at 20:59

## 2023-05-26 RX ADMIN — Medication 25 MILLIGRAM(S): at 16:57

## 2023-05-26 RX ADMIN — LEVETIRACETAM 750 MILLIGRAM(S): 250 TABLET, FILM COATED ORAL at 21:00

## 2023-05-26 NOTE — ED PROVIDER NOTE - ATTENDING CONTRIBUTION TO CARE
Attending MD Matamoros:  I personally have seen and examined this patient. I have performed a substantive portion of the visit including all aspects of the medical decision making.  Resident note reviewed and agree on plan of care and except where noted.        52-year-old gentleman with a history of CVAs with residual expressive aphasia previously on Eliquis switched to Lovenox this month after he was discovered to have a right lower extremity DVT, previous fasciotomy February 2023 of left lower extremity presenting for evaluation of dizziness for several days.  He states that the dizziness started when he was turning his head to may be the left of the right he is not sure.  The dizziness is like a swaying sensation like to move his room is moving and he has not.  He does not have any associated nausea or vomiting.  Denies any vision loss or double vision.  The patient admits that he has had baseline speech difficulty since his CVAs for a while now but maybe it seems he is having a little bit more difficulty with certain words recently.  He has baseline left foot drop which is not worse than priors.  He denies any tenderness or loss of hearing.    Patient's vital signs are nonactionable.  He is sitting in the stretcher in no apparent distress.  Awake and alert. Symmetric eyebrow raise, symmetric eyelid closure. PERRL b/l, EOMI b/l, no spontaneous nystagmus with primary or left/right end gaze.  [place]-Hallpike positive to the right, torsional nystagmus right beating noted with right [place]-Hallpike, symmetric smile, tongue midline.  5/5  strength bilaterally, 5/5 elbow flexion bilaterally, 5/5 elbow extension bilaterally, 5/5 shoulder shrug b/l.  5/5 plantar and dorsiflexion b/l, 5/5 knee flexion and extension b/l, 5/5 hip flexion and extension b/l. Sensation intact and symmetric grossly to light touch throughout face and bilateral upper and lower extremities,  Finger to nose normal bilaterally.  Gait deferred as patient ambulates baseline with a walker and no walker yet available to walk patient    52-year-old gentleman with a history of multiple CVAs in the past on Lovenox for DVT presenting for evaluation of intermittent dizziness for 4 days.  Patient's examination notable for no spontaneous nystagmus, [place]-Hallpike positive on the right.  Clinical history is notable for episodic/provoked vertigo.  We will attempt to ambulate patient to complete evaluation.  Initial impression is that patient's presentation is suggestive of peripheral vertigo and less likely consistent with central vertigo however patient does have very strong CVA history and is followed closely by neurology here so we will obtain the opinion of our neurology team to see if they agree that presentation is consistent with peripheral vertigo.        *The above represents an initial assessment/impression. Please refer to progress notes for potential changes in patient clinical course*

## 2023-05-26 NOTE — ED ADULT NURSE NOTE - OBJECTIVE STATEMENT
Patient is a 53 yo male A&Ox4 PMH multiple CVAs presenting to the ED by EMS from living facility complaining of dizziness x3 days. Patient states that he is currently on Lovenox for DVT in R calf. Patient states that the dizziness began x3 days ago, occurs with position changes and ambulation. Patient denies fever/chills, SOB, cough, chest pain, abd pain, n/v/diarrhea. On exam, PERRLA, gait steady with walker, 5/5 strength, no focal motor or sensory deficits.

## 2023-05-26 NOTE — ED PROVIDER NOTE - NSFOLLOWUPINSTRUCTIONS_ED_ALL_ED_FT
You were seen in the emergency department for dizziness.  Your diagnosis at this time appears to be vertigo.  You had CT imaging of the brain which was not suggestive of any serious cause of the vertigo such as stroke or any blood vessel issues.    You may use the medication meclizine 25 mg every 8 hours as needed for dizziness or nausea.  This is an over-the-counter medication that can be picked up    Please call your neurologist to arrange a follow-up appointment for within 1 to 2 weeks to ensure improvement in your symptoms.    At home you may try what is called the "Epley maneuver" to help reduce the severity of your dizzy symptoms.    Return to the emergency department for vision loss progressive difficulty speaking or weakness on one side of the body versus the other.

## 2023-05-26 NOTE — ED PROVIDER NOTE - PROGRESS NOTE DETAILS
Attending MD Matamoros: Patient was provided walker and he was able to steadily ambulate without assistance. MD Kirti (PGY-1) Received sign out on patient. In brief, 53 yo Male with PMhx TIA, CVA, DVTs, p/w dizziness. Patient pending CT reads and neuro recs. Attending MD Matamoros:Discussed case with neurology resident who is evaluated patient.  Neuroimaging appreciated and no acute findings.  Peripheral vertigo is suspected and central etiology to vertigo is not suspected.  Patient is stable for discharge for outpatient vestibular rehab as well as meclizine use.  You were seen

## 2023-05-26 NOTE — ED PROVIDER NOTE - PATIENT PORTAL LINK FT
You can access the FollowMyHealth Patient Portal offered by [facility] by registering at the following website: [email or web address]. By joining [employer]’s FollowMyHealth portal, you will also be able to view your health information using other applications (apps) compatible with our system.

## 2023-05-26 NOTE — ED PROVIDER NOTE - COVID-19 RESULT
Received patient from RN April, patient at baseline mental status, on BIPAP, breathing improved, able to make needs known, NAD, VSS, patient agreeable to plan of care, pending admission, comfort and safety provided. NEGATIVE

## 2023-05-26 NOTE — ED PROVIDER NOTE - OBJECTIVE STATEMENT
52-year-old male patient past medical history of TIA, CVA, chronic left DVT and new R DVT now with recent Lovenox prescription, compartment syndrome status post fasciotomy, hyperlipidemia, PFO who presents to the ED for dizziness since 3 days ago.  Patient has had similar prior dizziness in the past associated with CVA

## 2023-05-27 NOTE — CONSULT NOTE ADULT - SUBJECTIVE AND OBJECTIVE BOX
Neurology - Consult Note    -  Spectra: [phone] ([facility]), [phone] ([facility])  -    HPI: Patient TAMI DUNHAM is a 52y (1970) man with a PMHx significant for TIA, CVA, chronic left DVT and new R DVT now with recent Lovenox prescription, compartment syndrome status post fasciotomy, hyperlipidemia, PFO who presents to the ED for dizziness since 3 days ago. Dizziness is described as momentary room spinning sensation. triggered by certain head movements. He reports some improvement with meclizine. Denies N/V, HA, weakness, numbness, vision changes, dysphagia.    Review of Systems: refer to HPI    Allergies:  No Known Allergies    PMHx/PSHx/Family Hx: As above, otherwise see below   History of TIAs  CVA (cerebrovascular accident)  HLD (hyperlipidemia)  Vertigo    Social Hx:  No current use of tobacco, alcohol, or illicit drugs    Medications:  MEDICATIONS  (STANDING):  MEDICATIONS  (PRN):    Vitals:  T(C): 36.9 (05-26-23 @ 19:40), Max: 36.9 (05-26-23 @ 19:40)  HR: 76 (05-26-23 @ 21:55) (70 - 79)  BP: 118/82 (05-26-23 @ 21:55) (118/82 - 141/78)  RR: 18 (05-26-23 @ 21:55) (17 - 18)  SpO2: 96% (05-26-23 @ 21:55) (96% - 98%)    Physical Examination:   General - NAD    Neurologic Exam:  Mental status - Awake, Alert, Oriented to person, place, and time. Speech fluent, repetition and naming intact. Follows simple commands.     Cranial nerves - PERRLA, VFF, EOMI, face sensation (V1-V3) intact b/l, facial strength intact without asymmetry b/l, hearing intact b/l, palate with symmetric elevation, trapezius 5/5 strength b/l, tongue midline on protrusion with full lateral movement    Motor - Normal bulk and tone throughout. No pronator drift.  Strength testing            Deltoid      Biceps      Triceps          R            5                 5               5                     5                            L             5                 5               5                     5                                      Hip Flexion       Knee Flexion    Knee Extension    Dorsiflexion    Plantar Flexion  R              5                           5                       5                         5                        5                         L              5                           5                        5                        5                            5                          Sensation - Light touch intact throughout    DTR's -             Biceps      Triceps     Brachioradialis      Patellar    Ankle    Toes/plantar response  R             2+             2+                  2+                       2+            2+                 Down  L              2+             2+                 2+                        2+           2+                 Down    Coordination - Finger to Nose intact b/l. No tremors appreciated  Gait and station - Normal casual gait. Romberg (-)  HINTS (-)  [place]-hallpike (+)    Labs:                        13.2   6.93  )-----------( 202      ( 26 May 2023 17:13 )             40.4     05-26    141  |  104  |  15  ----------------------------<  82  3.8   |  22  |  0.99    Ca    8.6      26 May 2023 17:13    TPro  7.5  /  Alb  4.2  /  TBili  0.2  /  DBili  x   /  AST  17  /  ALT  23  /  AlkPhos  79  05-26    CAPILLARY BLOOD GLUCOSE  LIVER FUNCTIONS - ( 26 May 2023 17:13 )  Alb: 4.2 g/dL / Pro: 7.5 g/dL / ALK PHOS: 79 U/L / ALT: 23 U/L / AST: 17 U/L / GGT: x           PT/INR - ( 26 May 2023 17:13 )   PT: 11.0 sec;   INR: 0.96 ratio      PTT - ( 26 May 2023 17:13 )  PTT:44.0 sec    Radiology:  CT Head No Cont:  (26 May 2023 18:58)  Stable brain CT.  No mass effect or hemorrhage.  CTA No large vessel occlusion, significant stenosis or other vascular abnormality identified.

## 2023-05-27 NOTE — CONSULT NOTE ADULT - ASSESSMENT
Assessment: 52y (1970) man with a PMHx significant for TIA, CVA, chronic left DVT and new R DVT now with recent Lovenox prescription, compartment syndrome status post fasciotomy, hyperlipidemia, PFO who presents to the ED for dizziness since 3 days ago. Dizziness is described as momentary room spinning sensation. triggered by certain head movements. He reports some improvement with meclizine. Denies N/V, HA, weakness, numbness, vision changes, dysphagia. CT and CTA unremarkable. Exam notable for [place]-hallpike eliciting symptoms.    Impression:  episodic dizziness 2/2 peripheral vertigo like due to BPPV      Plan  [] Meclizine 12.5mg BID PRN (give one now)  [] Clonzepam 0.5mg now then Q8H PRN   [] Reglan 4mg  PRN Q8H  [] Refer for Vestibular Rehab on discharge  [] Neurology f/u outpatient with Dr. Forde    Case discussed with Neurology attending

## 2023-05-28 PROBLEM — G45.9 TIA (TRANSIENT ISCHEMIC ATTACK): Status: ACTIVE | Noted: 2022-09-06

## 2023-05-28 PROBLEM — E78.5 HLD (HYPERLIPIDEMIA): Status: ACTIVE | Noted: 2023-04-03

## 2023-05-28 PROBLEM — Q21.1 PFO (PATENT FORAMEN OVALE): Status: ACTIVE | Noted: 2022-09-06

## 2023-05-28 PROBLEM — I63.9 CVA (CEREBRAL VASCULAR ACCIDENT): Status: ACTIVE | Noted: 2022-09-06

## 2023-05-28 NOTE — HISTORY OF PRESENT ILLNESS
[FreeTextEntry1] : Mr. DUNHAM  is a 52 year old  male  who presents for initial endocrine evaluation. He presents with regard to a history of  positive TPO antibody  . He presents via the kind courtesy of   . 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 Augustine )In this bldg)\par \par On 2 anti seizrue medications\par \par Decewmbner 2 2022 found unresponsive-? fetl to be seizure.\par \par had been on testosteorne 2016 to November 2022-neurologsit feels strrokes may have jermaine contributed to by the testosterone.\par \par Had cvty left leg whiel at [facility] thenm had recurrence with need for fasciotomy left leg -Janaury 18 2023\par \par Then developed clot rt leg-etiol ?\par \par Follows with Hematol  Dr. Lucia\par \par Is on Lovenox  had developed clots on Eliquis and aspirin.\par \par very fatigued   etiology /\par \par Sleeps 8 hrs at night 11 hrs and sleeps about 4 hours during day.\par \par Living in assited living.\par \par \par Some recnet lavon pardo  ahs foot drop\par \par \par Proxy  Winter Conti   can speak to her

## 2023-05-29 LAB
CORTICOSTEROID BIND GLOBULIN: 1.5 MG/DL
CORTIS SERPL-MCNC: 8.6 UG/DL
CORTISOL, FREE: 1 UG/DL
PFCX: 12 %

## 2023-06-12 ENCOUNTER — APPOINTMENT (OUTPATIENT)
Dept: WOUND CARE | Facility: CLINIC | Age: 53
End: 2023-06-12
Payer: SELF-PAY

## 2023-06-12 ENCOUNTER — OUTPATIENT (OUTPATIENT)
Dept: OUTPATIENT SERVICES | Facility: HOSPITAL | Age: 53
LOS: 1 days | End: 2023-06-12
Payer: SELF-PAY

## 2023-06-12 DIAGNOSIS — Z98.890 OTHER SPECIFIED POSTPROCEDURAL STATES: Chronic | ICD-10-CM

## 2023-06-12 DIAGNOSIS — L97.922 NON-PRESSURE CHRONIC ULCER OF UNSPECIFIED PART OF LEFT LOWER LEG WITH FAT LAYER EXPOSED: ICD-10-CM

## 2023-06-12 DIAGNOSIS — L20.9 ATOPIC DERMATITIS, UNSPECIFIED: ICD-10-CM

## 2023-06-12 PROCEDURE — 99213 OFFICE O/P EST LOW 20 MIN: CPT

## 2023-06-12 PROCEDURE — G0463: CPT

## 2023-06-12 NOTE — PLAN
[FreeTextEntry1] : 6/12/23\par Plan:\par Wash wound with Dove skin sensitive soap and clean water and Vashe\par Apply steroid cream for itching\par Apply ---- Lawanda/Aquacel/Ashli/ACE\par Change dressing --- every other day\par Leg elevation as tolerated\par Encouraged ambulation or exercise.\par Optimization of nutrition.\par Offloading to the wound site.\par -Homecare orders in place for nursing\par Triamcinolone ordered to pharmacy\par Measured for compression garment\par Follow up appointment scheduled for 1 week via telehealth\par \par \par

## 2023-06-12 NOTE — HISTORY OF PRESENT ILLNESS
[FreeTextEntry1] : Mr. TAMI DUNHAM   presents to the office with a wound to  LLE extremity wound since 1/18/23 s/p CVA and a seizure of 12/2/22 with fever admitted at [facility], discharged to [facility].. He developed LLE swelling and was sent back to [facility] ED for a doppler which confirmed a DVT.  s/p LLE fasciotomy on 1/18/23 and then was subsequently taken back on 1/23/23 for debridement and washout.  Since then he was dc'd to [facility] rehab and has been receiving daily wound care.  He had a vac placed which was removed several weeks ago.  He was on Lovenox but was switched to Eliquis.  He has mild pain and numbness from his left calf down to his toes.  He is awaiting an EMG. Parents live in [place]. Family friend and Uncle present during office visit and involved with the care of the patient. 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

## 2023-06-12 NOTE — PHYSICAL EXAM
[Normal Breath Sounds] : Normal breath sounds [2+] : left 2+ [Alert] : alert [Oriented to Person] : oriented to person [Oriented to Place] : oriented to place [Oriented to Time] : oriented to time [Calm] : calm [Please See PDF for Tissue Analytics] : Please See PDF for Tissue Analytics. [JVD] : no jugular venous distention  [Abdomen Tenderness] : ~T ~M No abdominal tenderness [de-identified] : NAD, ambulatory [de-identified] : AT [de-identified] : supple [de-identified] : soft

## 2023-06-12 NOTE — ASSESSMENT
[FreeTextEntry1] : 6/12/23\par Wound Assessment and Plan:\par The patient presents with a wound to the LLE s/p fasciotomy s/p excisional debridement today  Swelling noted to the extremity. Patient reports periwound skin is itchy and he is scratching it. Periwound skin is red and irritated with scratch marks. Medial wound is healed, lateral wound with layer of yellow slough.\par s/p excisional debridement\par LLE washed with soap and water and Vashe\par No clinical sign of infection\par \par The patient was positioned to allow for optimization of imaging. The fluorescence imaging device was placed 8-12 cm from the patient and the clinical darkness required for imaging was obtained by a dark drape. The wound measured TA. The Proximex i:X fluorescence imaging device was used to report presence and location of red fluorescence, indicative of bacteria at loads greater than 104 CFU/g in real-time. Images reported to have red fluorescence. The wound was mechanically cleansed with VAshe and underwent excisional debridement. Fluorescence images acquired after cleansing demonstrated reduction in bacteria.\par \par \par Recommendation:\par Wash wound with Dove skin sensitive soap and clean water and Vashe\par Apply steroid cream for itching\par Apply ---- Lawanda/Aquacel/Ashli/ACE\par Change dressing --- every other day\par Leg elevation as tolerated\par Encouraged ambulation or exercise.\par Optimization of nutrition.\par Offloading to the wound site.\par -Homecare orders in place for nursing\par \par Follow up appointment scheduled for 1 week via telehealth\par \par \par

## 2023-06-26 ENCOUNTER — OUTPATIENT (OUTPATIENT)
Dept: OUTPATIENT SERVICES | Facility: HOSPITAL | Age: 53
LOS: 1 days | End: 2023-06-26
Payer: MEDICAID

## 2023-06-26 ENCOUNTER — APPOINTMENT (OUTPATIENT)
Dept: WOUND CARE | Facility: CLINIC | Age: 53
End: 2023-06-26
Payer: MEDICAID

## 2023-06-26 DIAGNOSIS — L97.922 NON-PRESSURE CHRONIC ULCER OF UNSPECIFIED PART OF LEFT LOWER LEG WITH FAT LAYER EXPOSED: ICD-10-CM

## 2023-06-26 PROCEDURE — G0463: CPT

## 2023-06-26 PROCEDURE — 99213 OFFICE O/P EST LOW 20 MIN: CPT | Mod: 95

## 2023-06-26 NOTE — PHYSICAL EXAM
[Alert] : alert [Oriented to Person] : oriented to person [Oriented to Place] : oriented to place [Oriented to Time] : oriented to time [Calm] : calm [Please See PDF for Tissue Analytics] : Please See PDF for [employer] Analytics. [Abdomen Tenderness] : ~T ~M No abdominal tenderness [de-identified] : NAD, ambulatory [de-identified] : AT [de-identified] : equal chest rise

## 2023-06-26 NOTE — ASSESSMENT
[FreeTextEntry1] : 6/12/23\par Wound Assessment and Plan:\par The patient presents with a wound to the LLE s/p fasciotomy s/p excisional debridement today  Swelling noted to the extremity. Patient reports periwound skin is itchy and he is scratching it. Periwound skin is red and irritated with scratch marks. Medial wound is healed, lateral wound with layer of yellow slough.\par s/p excisional debridement\par LLE washed with soap and water and Vashe\par No clinical sign of infection\par \par The patient was positioned to allow for optimization of imaging. The fluorescence imaging device was placed 8-12 cm from the patient and the clinical darkness required for imaging was obtained by a dark drape. The wound measured TA. The Blueleaf i:X fluorescence imaging device was used to report presence and location of red fluorescence, indicative of bacteria at loads greater than 104 CFU/g in real-time. Images reported to have red fluorescence. The wound was mechanically cleansed with VAshe and underwent excisional debridement. Fluorescence images acquired after cleansing demonstrated reduction in bacteria.\par \par \par Recommendation:\par Wash wound with Dove skin sensitive soap and clean water and Vashe\par Apply steroid cream for itching\par Apply ---- Lawanda/Aquacel/Ashli/ACE\par Change dressing --- every other day\par Leg elevation as tolerated\par Encouraged ambulation or exercise.\par Optimization of nutrition.\par Offloading to the wound site.\par -Homecare orders in place for nursing\par \par Follow up appointment scheduled for 1 week via telehealth\par \par 6-26-23:\par Pt here for f/u via Teleheath\par Accompanied by friend\par No new complaints\par Pt no longer has a Visiting Nurse--They were coming 1x/week and pt was doing the dressing other times\par Pt never got the lawanda.  Been using aquacel/ashli and ACE every other day.  Pt alos has been using triamcinolone to periwound\par On exam (TA pics reviewed):\par LLE lateral wound:  small superficial openings distally.  Dry scaly skin in the periwound.  Periwound irritation resolved. No apparent signs of infection\par \par \par \par

## 2023-06-26 NOTE — PLAN
[FreeTextEntry1] : 6/12/23\par Plan:\par Wash wound with Dove skin sensitive soap and clean water and Vashe\par Apply steroid cream for itching\par Apply ---- Lawanda/Aquacel/Ashli/ACE\par Change dressing --- every other day\par Leg elevation as tolerated\par Encouraged ambulation or exercise.\par Optimization of nutrition.\par Offloading to the wound site.\par -Homecare orders in place for nursing\par Triamcinolone ordered to pharmacy\par Measured for compression garment\par Follow up appointment scheduled for 1 week via telehealth\par \par 6-23-23:\par Plan:\par LLE: wash with soap and water.\par aquacel/ashli/ace every other day\par Stop triamcinolone\par moisturize intact skin\par supplies ordered\par f/u in office in 2 weeks \par \par \par

## 2023-06-26 NOTE — REASON FOR VISIT
[Home] : at home, [unfilled] , at the time of the visit. [Medical Office: ([facility])___] : at the medical office located in  [Other:____] : [unfilled] [Patient] : the patient [Self] : self [Follow-Up: _____] : a [unfilled] follow-up visit [Friend] : friend [Other: _____] : [unfilled] [FreeTextEntry1] : left leg surgical wound

## 2023-06-26 NOTE — HISTORY OF PRESENT ILLNESS
[FreeTextEntry1] : Mr. TAMI DUNHAM   presents to the office with a wound to  LLE extremity wound since 1/18/23 s/p CVA and a seizure of 12/2/22 with fever admitted at [facility], discharged to [facility].. He developed LLE swelling and was sent back to [facility] ED for a doppler which confirmed a DVT.  s/p LLE fasciotomy on 1/18/23 and then was subsequently taken back on 1/23/23 for debridement and washout.  Since then he was dc'd to [facility] rehab and has been receiving daily wound care.  He had a vac placed which was removed several weeks ago.  He was on Lovenox but was switched to Eliquis.  He has mild pain and numbness from his left calf down to his toes.  He is awaiting an EMG. Parents live in [place]. Family friend and Uncle present during office visit and involved with the care of the patient. 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
Yes
Obdulio Salas MD

## 2023-06-28 NOTE — ED PROVIDER NOTE - DIFFERENTIAL DIAGNOSIS
See attending attestation Differential Diagnosis Scc Well Differentiated Histology Text: There were aggregates of well-differentiated squamous cells.

## 2023-07-12 ENCOUNTER — APPOINTMENT (OUTPATIENT)
Dept: WOUND CARE | Facility: CLINIC | Age: 53
End: 2023-07-12
Payer: MEDICAID

## 2023-07-12 ENCOUNTER — OUTPATIENT (OUTPATIENT)
Dept: OUTPATIENT SERVICES | Facility: HOSPITAL | Age: 53
LOS: 1 days | End: 2023-07-12
Payer: MEDICAID

## 2023-07-12 VITALS
RESPIRATION RATE: 16 BRPM | SYSTOLIC BLOOD PRESSURE: 119 MMHG | TEMPERATURE: 97.4 F | DIASTOLIC BLOOD PRESSURE: 80 MMHG | OXYGEN SATURATION: 94 % | HEART RATE: 87 BPM

## 2023-07-12 DIAGNOSIS — Z98.890 OTHER SPECIFIED POSTPROCEDURAL STATES: Chronic | ICD-10-CM

## 2023-07-12 PROCEDURE — G0463: CPT

## 2023-07-12 PROCEDURE — 99214 OFFICE O/P EST MOD 30 MIN: CPT

## 2023-07-12 NOTE — HISTORY OF PRESENT ILLNESS
[FreeTextEntry1] : Mr. TAMI DUNHAM   presents to the office with a wound to  LLE extremity wound since 1/18/23 s/p CVA and a seizure of 12/2/22 with fever admitted at [facility], discharged to [facility].. He developed LLE swelling and was sent back to [facility] ED for a doppler which confirmed a DVT.  s/p LLE fasciotomy on 1/18/23 and then was subsequently taken back on 1/23/23 for debridement and washout.  Since then he was dc'd to [facility] rehab and has been receiving daily wound care.  He had a vac placed which was removed several weeks ago.  He was on Lovenox but was switched to Eliquis.  He has mild pain and numbness from his left calf down to his toes.  He is awaiting an EMG. Parents live in [place]. Family friend and Uncle present during office visit and involved with the care of the patient. 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

## 2023-07-12 NOTE — PLAN
[FreeTextEntry1] : 6/12/23\par Plan:\par Wash wound with Dove skin sensitive soap and clean water and Vashe\par Apply steroid cream for itching\par Apply ---- Lawanda/Aquacel/Ashli/ACE\par Change dressing --- every other day\par Leg elevation as tolerated\par Encouraged ambulation or exercise.\par Optimization of nutrition.\par Offloading to the wound site.\par -Homecare orders in place for nursing\par Triamcinolone ordered to pharmacy\par Measured for compression garment\par Follow up appointment scheduled for 1 week via telehealth\par \par 6-23-23:\par Plan:\par LLE: wash with soap and water.\par aquacel/ashli/ace every other day\par Stop triamcinolone\par moisturize intact skin\par supplies ordered\par f/u in office in 2 weeks \par \par 7/12/23\par Plan:\par Stop using Triamcinolone cream\par Apply a non fragrant moisturizer twice daily to the calf\par Patient given a new prescription for Circaid compression garment.\par cavilon applied today\[facility] \par \par \par

## 2023-07-12 NOTE — PHYSICAL EXAM
[Alert] : alert [Oriented to Person] : oriented to person [Oriented to Place] : oriented to place [Oriented to Time] : oriented to time [Calm] : calm [Please See PDF for Tissue Analytics] : Please See PDF for [employer] Analytics. [1+] : left 1+ [de-identified] : NAD, ambulatory [de-identified] : AT [de-identified] : supple [de-identified] : equal chest rise

## 2023-07-12 NOTE — REASON FOR VISIT
[Follow-Up: _____] : a [unfilled] follow-up visit [Friend] : friend [Other: _____] : [unfilled] [Other:____] : [unfilled] [FreeTextEntry1] : left leg surgical wound

## 2023-07-12 NOTE — ASSESSMENT
[FreeTextEntry1] : 6/12/23\par Wound Assessment and Plan:\par The patient presents with a wound to the LLE s/p fasciotomy s/p excisional debridement today  Swelling noted to the extremity. Patient reports periwound skin is itchy and he is scratching it. Periwound skin is red and irritated with scratch marks. Medial wound is healed, lateral wound with layer of yellow slough.\par s/p excisional debridement\par LLE washed with soap and water and Vashe\par No clinical sign of infection\par \par The patient was positioned to allow for optimization of imaging. The fluorescence imaging device was placed 8-12 cm from the patient and the clinical darkness required for imaging was obtained by a dark drape. The wound measured TA. The ApprenNet i:X fluorescence imaging device was used to report presence and location of red fluorescence, indicative of bacteria at loads greater than 104 CFU/g in real-time. Images reported to have red fluorescence. The wound was mechanically cleansed with VAshe and underwent excisional debridement. Fluorescence images acquired after cleansing demonstrated reduction in bacteria.\par \par \par Recommendation:\par Wash wound with Dove skin sensitive soap and clean water and Vashe\par Apply steroid cream for itching\par Apply ---- Lawanda/Aquacel/Ashli/ACE\par Change dressing --- every other day\par Leg elevation as tolerated\par Encouraged ambulation or exercise.\par Optimization of nutrition.\par Offloading to the wound site.\par -Homecare orders in place for nursing\par \par Follow up appointment scheduled for 1 week via telehealth\par \par 6-26-23:\par Pt here for f/u via Teleheath\par Accompanied by friend\par No new complaints\par Pt no longer has a Visiting Nurse--They were coming 1x/week and pt was doing the dressing other times\par Pt never got the lawanda.  Been using aquacel/ashli and ACE every other day.  Pt alos has been using triamcinolone to periwound\par On exam (TA pics reviewed):\par LLE lateral wound:  small superficial openings distally.  Dry scaly skin in the periwound.  Periwound irritation resolved. No apparent signs of infection\par \par 7/12/12\par Patient presents for follow up of LLE surgical wound accompanied by family friend. Wounds from fasciotomy are healed. Skin on the calf is dry and scaly with small red lesions. Patient has been applying Triamcinolone cream daily. \par Patient has not obtained a compression garment for the LLE.\par No new complaints\par LLE lateral wound: \par Dry scaly skin in the periwound.  Periwound irritation present (petechial appearing). No s/s of infection\par Wounds at the distal end of the lateral surgical site is closed.  2 small small superficial appearing scabs present\par s/p mechanical debridement \par \par \par \par \par \par

## 2023-07-13 DIAGNOSIS — S81.802D UNSPECIFIED OPEN WOUND, LEFT LOWER LEG, SUBSEQUENT ENCOUNTER: ICD-10-CM

## 2023-07-24 ENCOUNTER — APPOINTMENT (OUTPATIENT)
Dept: DERMATOLOGY | Facility: CLINIC | Age: 53
End: 2023-07-24
Payer: MEDICAID

## 2023-07-24 VITALS — HEIGHT: 75 IN | WEIGHT: 210 LBS | BODY MASS INDEX: 26.11 KG/M2

## 2023-07-24 DIAGNOSIS — Z80.0 FAMILY HISTORY OF MALIGNANT NEOPLASM OF DIGESTIVE ORGANS: ICD-10-CM

## 2023-07-24 DIAGNOSIS — Z80.8 FAMILY HISTORY OF MALIGNANT NEOPLASM OF OTHER ORGANS OR SYSTEMS: ICD-10-CM

## 2023-07-24 DIAGNOSIS — Z80.42 FAMILY HISTORY OF MALIGNANT NEOPLASM OF PROSTATE: ICD-10-CM

## 2023-07-24 DIAGNOSIS — Z80.3 FAMILY HISTORY OF MALIGNANT NEOPLASM OF BREAST: ICD-10-CM

## 2023-07-24 DIAGNOSIS — Z80.9 FAMILY HISTORY OF MALIGNANT NEOPLASM, UNSPECIFIED: ICD-10-CM

## 2023-07-24 PROCEDURE — 99204 OFFICE O/P NEW MOD 45 MIN: CPT

## 2023-07-25 DIAGNOSIS — S81.802D UNSPECIFIED OPEN WOUND, LEFT LOWER LEG, SUBSEQUENT ENCOUNTER: ICD-10-CM

## 2023-08-01 ENCOUNTER — OUTPATIENT (OUTPATIENT)
Dept: OUTPATIENT SERVICES | Facility: HOSPITAL | Age: 53
LOS: 1 days | End: 2023-08-01

## 2023-08-01 ENCOUNTER — APPOINTMENT (OUTPATIENT)
Dept: WOUND CARE | Facility: CLINIC | Age: 53
End: 2023-08-01
Payer: MEDICAID

## 2023-08-01 DIAGNOSIS — S81.802D UNSPECIFIED OPEN WOUND, LEFT LOWER LEG, SUBSEQUENT ENCOUNTER: ICD-10-CM

## 2023-08-01 DIAGNOSIS — Z87.828 PERSONAL HISTORY OF OTHER (HEALED) PHYSICAL INJURY AND TRAUMA: ICD-10-CM

## 2023-08-01 DIAGNOSIS — Z98.890 OTHER SPECIFIED POSTPROCEDURAL STATES: Chronic | ICD-10-CM

## 2023-08-01 PROCEDURE — ZZZZZ: CPT

## 2023-08-01 NOTE — PHYSICAL EXAM
[Alert] : alert [Oriented to Person] : oriented to person [Oriented to Place] : oriented to place [Oriented to Time] : oriented to time [Calm] : calm [de-identified] : NAD, ambulatory [de-identified] : AT [de-identified] : equal chest rise [de-identified] : LLE wounds healed [Please See PDF for Tissue Analytics] : Please See PDF for Tissue Analytics.

## 2023-08-01 NOTE — ASSESSMENT
[FreeTextEntry1] : 6/12/23 Wound Assessment and Plan: The patient presents with a wound to the LLE s/p fasciotomy s/p excisional debridement today  Swelling noted to the extremity. Patient reports periwound skin is itchy and he is scratching it. Periwound skin is red and irritated with scratch marks. Medial wound is healed, lateral wound with layer of yellow slough. s/p excisional debridement LLE washed with soap and water and Vashe No clinical sign of infection  The patient was positioned to allow for optimization of imaging. The fluorescence imaging device was placed 8-12 cm from the patient and the clinical darkness required for imaging was obtained by a dark drape. The wound measured TA. The ice i:X fluorescence imaging device was used to report presence and location of red fluorescence, indicative of bacteria at loads greater than 104 CFU/g in real-time. Images reported to have red fluorescence. The wound was mechanically cleansed with VAshe and underwent excisional debridement. Fluorescence images acquired after cleansing demonstrated reduction in bacteria.   Recommendation: Wash wound with Dove skin sensitive soap and clean water and Vashe Apply steroid cream for itching Apply ---- Lawanda/Aquacel/Ashli/ACE Change dressing --- every other day Leg elevation as tolerated Encouraged ambulation or exercise. Optimization of nutrition. Offloading to the wound site. -Homecare orders in place for nursing  Follow up appointment scheduled for 1 week via telehealth  6-26-23: Pt here for f/u via Teleheath Accompanied by friend No new complaints Pt no longer has a Visiting Nurse--They were coming 1x/week and pt was doing the dressing other times Pt never got the lawanda.  Been using aquacel/ashli and ACE every other day.  Pt alos has been using triamcinolone to periwound On exam (TA pics reviewed): LLE lateral wound:  small superficial openings distally.  Dry scaly skin in the periwound.  Periwound irritation resolved. No apparent signs of infection  7/12/12 Patient presents for follow up of LLE surgical wound accompanied by family friend. Wounds from fasciotomy are healed. Skin on the calf is dry and scaly with small red lesions. Patient has been applying Triamcinolone cream daily.  Patient has not obtained a compression garment for the LLE. No new complaints LLE lateral wound:  Dry scaly skin in the periwound.  Periwound irritation present (petechial appearing). No s/s of infection Wounds at the distal end of the lateral surgical site is closed.  2 small small superficial appearing scabs present s/p mechanical debridement   8-1-23: Pt here for f/u via Teleheath but could not get video to work.  Visit completed with audio only (telephonic) Accompanied by friend No new complaints Received circaid today Saw derm for irritation and was prescribed a steroid On exam (TA pics reviewed): LLE surgical wounds HEALED . Scars present.  Lateral side of leg with dry scaly skin.  Periwound irritation resolved. No apparent signs of infection.

## 2023-08-01 NOTE — PLAN
[FreeTextEntry1] : 6/12/23 Plan: Wash wound with Dove skin sensitive soap and clean water and Vashe Apply steroid cream for itching Apply ---- Lawanda/Aquacel/Ashli/ACE Change dressing --- every other day Leg elevation as tolerated Encouraged ambulation or exercise. Optimization of nutrition. Offloading to the wound site. -Homecare orders in place for nursing Triamcinolone ordered to pharmacy Measured for compression garment Follow up appointment scheduled for 1 week via telehealth  6-23-23: Plan: LLE: wash with soap and water. aquacel/ashli/ace every other day Stop triamcinolone moisturize intact skin supplies ordered f/u in office in 2 weeks   7/12/23 Plan: Stop using Triamcinolone cream Apply a non fragrant moisturizer twice daily to the calf Patient given a new prescription for Circaid compression garment. cavilon [facility]   8-1-23: LLE wounds HEALED wash with soap and water sunscreen to scars daily when exposed to sunlight wear circiads f/u prn

## 2023-08-01 NOTE — HISTORY OF PRESENT ILLNESS
[FreeTextEntry1] : Mr. TAMI DUNHAM   presents to the office with a wound to  LLE extremity wound since 1/18/23 s/p CVA and a seizure of 12/2/22 with fever admitted at [facility], discharged to [facility].. He developed LLE swelling and was sent back to [facility] ED for a doppler which confirmed a DVT.  s/p LLE fasciotomy on 1/18/23 and then was subsequently taken back on 1/23/23 for debridement and washout.  Since then he was dc'd to [facility] rehab and has been receiving daily wound care.  He had a vac placed which was removed several weeks ago.  He was on Lovenox but was switched to Eliquis.  He has mild pain and numbness from his left calf down to his toes.  He is awaiting an EMG. Parents live in [place]. Family friend and Uncle present during office visit and involved with the care of the patient. 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

## 2023-08-01 NOTE — REASON FOR VISIT
[Home] : at home, [unfilled] , at the time of the visit. [Medical Office: ([facility])___] : at the medical office located in  [Other:____] : [unfilled] [Verbal consent obtained from patient] : the patient, [unfilled] [Follow-Up: _____] : a [unfilled] follow-up visit [Friend] : friend [FreeTextEntry1] : left leg surgical wound

## 2023-08-08 DIAGNOSIS — S81.802D UNSPECIFIED OPEN WOUND, LEFT LOWER LEG, SUBSEQUENT ENCOUNTER: ICD-10-CM

## 2023-08-08 DIAGNOSIS — Z87.828 PERSONAL HISTORY OF OTHER (HEALED) PHYSICAL INJURY AND TRAUMA: ICD-10-CM

## 2023-08-28 ENCOUNTER — APPOINTMENT (OUTPATIENT)
Dept: DERMATOLOGY | Facility: CLINIC | Age: 53
End: 2023-08-28
Payer: MEDICAID

## 2023-08-28 ENCOUNTER — APPOINTMENT (OUTPATIENT)
Dept: ULTRASOUND IMAGING | Facility: CLINIC | Age: 53
End: 2023-08-28
Payer: MEDICAID

## 2023-08-28 ENCOUNTER — OUTPATIENT (OUTPATIENT)
Dept: OUTPATIENT SERVICES | Facility: HOSPITAL | Age: 53
LOS: 1 days | End: 2023-08-28
Payer: MEDICAID

## 2023-08-28 DIAGNOSIS — R90.89 OTHER ABNORMAL FINDINGS ON DIAGNOSTIC IMAGING OF CENTRAL NERVOUS SYSTEM: ICD-10-CM

## 2023-08-28 DIAGNOSIS — I82.890 ACUTE EMBOLISM AND THROMBOSIS OF OTHER SPECIFIED VEINS: ICD-10-CM

## 2023-08-28 DIAGNOSIS — Z86.73 PERSONAL HISTORY OF TRANSIENT ISCHEMIC ATTACK (TIA), AND CEREBRAL INFARCTION WITHOUT RESIDUAL DEFICITS: ICD-10-CM

## 2023-08-28 DIAGNOSIS — Z98.890 OTHER SPECIFIED POSTPROCEDURAL STATES: Chronic | ICD-10-CM

## 2023-08-28 DIAGNOSIS — L85.3 XEROSIS CUTIS: ICD-10-CM

## 2023-08-28 PROCEDURE — 93970 EXTREMITY STUDY: CPT | Mod: 26

## 2023-08-28 PROCEDURE — 93970 EXTREMITY STUDY: CPT

## 2023-08-28 PROCEDURE — 99214 OFFICE O/P EST MOD 30 MIN: CPT

## 2023-09-05 ENCOUNTER — OUTPATIENT (OUTPATIENT)
Dept: OUTPATIENT SERVICES | Facility: HOSPITAL | Age: 53
LOS: 1 days | End: 2023-09-05
Payer: MEDICAID

## 2023-09-05 ENCOUNTER — APPOINTMENT (OUTPATIENT)
Dept: CT IMAGING | Facility: IMAGING CENTER | Age: 53
End: 2023-09-05
Payer: MEDICAID

## 2023-09-05 DIAGNOSIS — I63.9 CEREBRAL INFARCTION, UNSPECIFIED: ICD-10-CM

## 2023-09-05 DIAGNOSIS — Z98.890 OTHER SPECIFIED POSTPROCEDURAL STATES: Chronic | ICD-10-CM

## 2023-09-05 PROCEDURE — 70496 CT ANGIOGRAPHY HEAD: CPT

## 2023-09-05 PROCEDURE — 70496 CT ANGIOGRAPHY HEAD: CPT | Mod: 26

## 2023-09-27 NOTE — ASU PREOP CHECKLIST - TEMPERATURE IN FAHRENHEIT (DEGREES F)
98.7 Doxycycline Pregnancy And Lactation Text: This medication is Pregnancy Category D and not consider safe during pregnancy. It is also excreted in breast milk but is considered safe for shorter treatment courses.

## 2023-10-11 ENCOUNTER — TRANSCRIPTION ENCOUNTER (OUTPATIENT)
Age: 53
End: 2023-10-11

## 2023-10-11 ENCOUNTER — INPATIENT (INPATIENT)
Facility: HOSPITAL | Age: 53
LOS: 1 days | Discharge: HOME | End: 2023-10-13
Attending: STUDENT IN AN ORGANIZED HEALTH CARE EDUCATION/TRAINING PROGRAM | Admitting: STUDENT IN AN ORGANIZED HEALTH CARE EDUCATION/TRAINING PROGRAM
Payer: MEDICAID

## 2023-10-11 VITALS
DIASTOLIC BLOOD PRESSURE: 79 MMHG | SYSTOLIC BLOOD PRESSURE: 123 MMHG | OXYGEN SATURATION: 99 % | HEART RATE: 67 BPM | RESPIRATION RATE: 18 BRPM | TEMPERATURE: 98 F

## 2023-10-11 DIAGNOSIS — Z09 ENCOUNTER FOR FOLLOW-UP EXAMINATION AFTER COMPLETED TREATMENT FOR CONDITIONS OTHER THAN MALIGNANT NEOPLASM: ICD-10-CM

## 2023-10-11 DIAGNOSIS — Z98.890 OTHER SPECIFIED POSTPROCEDURAL STATES: Chronic | ICD-10-CM

## 2023-10-11 DIAGNOSIS — E78.5 HYPERLIPIDEMIA, UNSPECIFIED: ICD-10-CM

## 2023-10-11 DIAGNOSIS — Z29.9 ENCOUNTER FOR PROPHYLACTIC MEASURES, UNSPECIFIED: ICD-10-CM

## 2023-10-11 DIAGNOSIS — H81.10 BENIGN PAROXYSMAL VERTIGO, UNSPECIFIED EAR: ICD-10-CM

## 2023-10-11 DIAGNOSIS — R42 DIZZINESS AND GIDDINESS: ICD-10-CM

## 2023-10-11 DIAGNOSIS — R56.9 UNSPECIFIED CONVULSIONS: ICD-10-CM

## 2023-10-11 DIAGNOSIS — I63.9 CEREBRAL INFARCTION, UNSPECIFIED: ICD-10-CM

## 2023-10-11 LAB
ALBUMIN SERPL ELPH-MCNC: 4.3 G/DL — SIGNIFICANT CHANGE UP (ref 3.3–5)
ALP SERPL-CCNC: 80 U/L — SIGNIFICANT CHANGE UP (ref 40–120)
ALT FLD-CCNC: 34 U/L — SIGNIFICANT CHANGE UP (ref 4–41)
ANION GAP SERPL CALC-SCNC: 9 MMOL/L — SIGNIFICANT CHANGE UP (ref 7–14)
APTT BLD: 38.1 SEC — HIGH (ref 24.5–35.6)
AST SERPL-CCNC: 19 U/L — SIGNIFICANT CHANGE UP (ref 4–40)
BASOPHILS # BLD AUTO: 0.03 K/UL — SIGNIFICANT CHANGE UP (ref 0–0.2)
BASOPHILS NFR BLD AUTO: 0.6 % — SIGNIFICANT CHANGE UP (ref 0–2)
BILIRUB SERPL-MCNC: 0.4 MG/DL — SIGNIFICANT CHANGE UP (ref 0.2–1.2)
BUN SERPL-MCNC: 13 MG/DL — SIGNIFICANT CHANGE UP (ref 7–23)
CALCIUM SERPL-MCNC: 8.9 MG/DL — SIGNIFICANT CHANGE UP (ref 8.4–10.5)
CHLORIDE SERPL-SCNC: 104 MMOL/L — SIGNIFICANT CHANGE UP (ref 98–107)
CO2 SERPL-SCNC: 26 MMOL/L — SIGNIFICANT CHANGE UP (ref 22–31)
CREAT SERPL-MCNC: 0.96 MG/DL — SIGNIFICANT CHANGE UP (ref 0.5–1.3)
EGFR: 95 ML/MIN/1.73M2 — SIGNIFICANT CHANGE UP
EOSINOPHIL # BLD AUTO: 0.23 K/UL — SIGNIFICANT CHANGE UP (ref 0–0.5)
EOSINOPHIL NFR BLD AUTO: 4.3 % — SIGNIFICANT CHANGE UP (ref 0–6)
GLUCOSE SERPL-MCNC: 103 MG/DL — HIGH (ref 70–99)
HCT VFR BLD CALC: 41.2 % — SIGNIFICANT CHANGE UP (ref 39–50)
HGB BLD-MCNC: 14.1 G/DL — SIGNIFICANT CHANGE UP (ref 13–17)
IANC: 3.89 K/UL — SIGNIFICANT CHANGE UP (ref 1.8–7.4)
IMM GRANULOCYTES NFR BLD AUTO: 0.6 % — SIGNIFICANT CHANGE UP (ref 0–0.9)
INR BLD: <0.9 RATIO — SIGNIFICANT CHANGE UP (ref 0.85–1.18)
LYMPHOCYTES # BLD AUTO: 0.83 K/UL — LOW (ref 1–3.3)
LYMPHOCYTES # BLD AUTO: 15.5 % — SIGNIFICANT CHANGE UP (ref 13–44)
MCHC RBC-ENTMCNC: 29.9 PG — SIGNIFICANT CHANGE UP (ref 27–34)
MCHC RBC-ENTMCNC: 34.2 GM/DL — SIGNIFICANT CHANGE UP (ref 32–36)
MCV RBC AUTO: 87.3 FL — SIGNIFICANT CHANGE UP (ref 80–100)
MONOCYTES # BLD AUTO: 0.36 K/UL — SIGNIFICANT CHANGE UP (ref 0–0.9)
MONOCYTES NFR BLD AUTO: 6.7 % — SIGNIFICANT CHANGE UP (ref 2–14)
NEUTROPHILS # BLD AUTO: 3.89 K/UL — SIGNIFICANT CHANGE UP (ref 1.8–7.4)
NEUTROPHILS NFR BLD AUTO: 72.3 % — SIGNIFICANT CHANGE UP (ref 43–77)
NRBC # BLD: 0 /100 WBCS — SIGNIFICANT CHANGE UP (ref 0–0)
NRBC # FLD: 0 K/UL — SIGNIFICANT CHANGE UP (ref 0–0)
PLATELET # BLD AUTO: 176 K/UL — SIGNIFICANT CHANGE UP (ref 150–400)
POTASSIUM SERPL-MCNC: 3.9 MMOL/L — SIGNIFICANT CHANGE UP (ref 3.5–5.3)
POTASSIUM SERPL-SCNC: 3.9 MMOL/L — SIGNIFICANT CHANGE UP (ref 3.5–5.3)
PROT SERPL-MCNC: 7.1 G/DL — SIGNIFICANT CHANGE UP (ref 6–8.3)
PROTHROM AB SERPL-ACNC: 10.1 SEC — SIGNIFICANT CHANGE UP (ref 9.5–13)
RBC # BLD: 4.72 M/UL — SIGNIFICANT CHANGE UP (ref 4.2–5.8)
RBC # FLD: 12.7 % — SIGNIFICANT CHANGE UP (ref 10.3–14.5)
SODIUM SERPL-SCNC: 139 MMOL/L — SIGNIFICANT CHANGE UP (ref 135–145)
TROPONIN T, HIGH SENSITIVITY RESULT: 14 NG/L — SIGNIFICANT CHANGE UP
WBC # BLD: 5.37 K/UL — SIGNIFICANT CHANGE UP (ref 3.8–10.5)
WBC # FLD AUTO: 5.37 K/UL — SIGNIFICANT CHANGE UP (ref 3.8–10.5)

## 2023-10-11 PROCEDURE — 70498 CT ANGIOGRAPHY NECK: CPT | Mod: 26,MA

## 2023-10-11 PROCEDURE — 99285 EMERGENCY DEPT VISIT HI MDM: CPT

## 2023-10-11 PROCEDURE — 70496 CT ANGIOGRAPHY HEAD: CPT | Mod: 26,MA

## 2023-10-11 PROCEDURE — 99223 1ST HOSP IP/OBS HIGH 75: CPT | Mod: GC

## 2023-10-11 PROCEDURE — 0042T: CPT | Mod: MA

## 2023-10-11 PROCEDURE — 70450 CT HEAD/BRAIN W/O DYE: CPT | Mod: 26,MA,59

## 2023-10-11 RX ORDER — ATORVASTATIN CALCIUM 80 MG/1
1 TABLET, FILM COATED ORAL
Qty: 0 | Refills: 0 | DISCHARGE

## 2023-10-11 RX ORDER — SODIUM CHLORIDE 9 MG/ML
1000 INJECTION INTRAMUSCULAR; INTRAVENOUS; SUBCUTANEOUS
Refills: 0 | Status: DISCONTINUED | OUTPATIENT
Start: 2023-10-11 | End: 2023-10-12

## 2023-10-11 RX ORDER — CLONAZEPAM 1 MG
0.5 TABLET ORAL EVERY 8 HOURS
Refills: 0 | Status: DISCONTINUED | OUTPATIENT
Start: 2023-10-11 | End: 2023-10-12

## 2023-10-11 RX ORDER — ONDANSETRON 8 MG/1
4 TABLET, FILM COATED ORAL ONCE
Refills: 0 | Status: COMPLETED | OUTPATIENT
Start: 2023-10-11 | End: 2023-10-11

## 2023-10-11 RX ORDER — LISDEXAMFETAMINE DIMESYLATE 70 MG/1
50 CAPSULE ORAL
Refills: 0 | Status: DISCONTINUED | OUTPATIENT
Start: 2023-10-12 | End: 2023-10-13

## 2023-10-11 RX ORDER — ACETAMINOPHEN 500 MG
650 TABLET ORAL EVERY 6 HOURS
Refills: 0 | Status: DISCONTINUED | OUTPATIENT
Start: 2023-10-11 | End: 2023-10-13

## 2023-10-11 RX ORDER — GABAPENTIN 400 MG/1
300 CAPSULE ORAL THREE TIMES A DAY
Refills: 0 | Status: DISCONTINUED | OUTPATIENT
Start: 2023-10-11 | End: 2023-10-12

## 2023-10-11 RX ORDER — INFLUENZA VIRUS VACCINE 15; 15; 15; 15 UG/.5ML; UG/.5ML; UG/.5ML; UG/.5ML
0.5 SUSPENSION INTRAMUSCULAR ONCE
Refills: 0 | Status: DISCONTINUED | OUTPATIENT
Start: 2023-10-11 | End: 2023-10-13

## 2023-10-11 RX ORDER — LEVETIRACETAM 250 MG/1
250 TABLET, FILM COATED ORAL
Refills: 0 | Status: DISCONTINUED | OUTPATIENT
Start: 2023-10-11 | End: 2023-10-13

## 2023-10-11 RX ORDER — MECLIZINE HCL 12.5 MG
25 TABLET ORAL ONCE
Refills: 0 | Status: COMPLETED | OUTPATIENT
Start: 2023-10-11 | End: 2023-10-11

## 2023-10-11 RX ORDER — ATORVASTATIN CALCIUM 80 MG/1
20 TABLET, FILM COATED ORAL AT BEDTIME
Refills: 0 | Status: DISCONTINUED | OUTPATIENT
Start: 2023-10-11 | End: 2023-10-13

## 2023-10-11 RX ORDER — LEVETIRACETAM 250 MG/1
1500 TABLET, FILM COATED ORAL
Qty: 0 | Refills: 0 | DISCHARGE

## 2023-10-11 RX ORDER — ENOXAPARIN SODIUM 100 MG/ML
150 INJECTION SUBCUTANEOUS EVERY 24 HOURS
Refills: 0 | Status: DISCONTINUED | OUTPATIENT
Start: 2023-10-11 | End: 2023-10-13

## 2023-10-11 RX ORDER — LACOSAMIDE 50 MG/1
100 TABLET ORAL
Refills: 0 | Status: DISCONTINUED | OUTPATIENT
Start: 2023-10-11 | End: 2023-10-13

## 2023-10-11 RX ORDER — ONDANSETRON 8 MG/1
4 TABLET, FILM COATED ORAL EVERY 6 HOURS
Refills: 0 | Status: DISCONTINUED | OUTPATIENT
Start: 2023-10-11 | End: 2023-10-13

## 2023-10-11 RX ORDER — ASPIRIN/CALCIUM CARB/MAGNESIUM 324 MG
81 TABLET ORAL DAILY
Refills: 0 | Status: DISCONTINUED | OUTPATIENT
Start: 2023-10-11 | End: 2023-10-13

## 2023-10-11 RX ORDER — LANOLIN ALCOHOL/MO/W.PET/CERES
3 CREAM (GRAM) TOPICAL AT BEDTIME
Refills: 0 | Status: DISCONTINUED | OUTPATIENT
Start: 2023-10-11 | End: 2023-10-13

## 2023-10-11 RX ORDER — MECLIZINE HCL 12.5 MG
50 TABLET ORAL
Refills: 0 | Status: DISCONTINUED | OUTPATIENT
Start: 2023-10-11 | End: 2023-10-13

## 2023-10-11 RX ORDER — METOCLOPRAMIDE HCL 10 MG
10 TABLET ORAL ONCE
Refills: 0 | Status: COMPLETED | OUTPATIENT
Start: 2023-10-11 | End: 2023-10-11

## 2023-10-11 RX ORDER — PANTOPRAZOLE SODIUM 20 MG/1
40 TABLET, DELAYED RELEASE ORAL
Refills: 0 | Status: DISCONTINUED | OUTPATIENT
Start: 2023-10-11 | End: 2023-10-13

## 2023-10-11 RX ADMIN — Medication 25 MILLIGRAM(S): at 16:39

## 2023-10-11 RX ADMIN — Medication 25 MILLIGRAM(S): at 13:03

## 2023-10-11 RX ADMIN — GABAPENTIN 300 MILLIGRAM(S): 400 CAPSULE ORAL at 22:58

## 2023-10-11 RX ADMIN — Medication 10 MILLIGRAM(S): at 14:00

## 2023-10-11 RX ADMIN — LEVETIRACETAM 250 MILLIGRAM(S): 250 TABLET, FILM COATED ORAL at 17:42

## 2023-10-11 RX ADMIN — ATORVASTATIN CALCIUM 20 MILLIGRAM(S): 80 TABLET, FILM COATED ORAL at 22:58

## 2023-10-11 RX ADMIN — ONDANSETRON 4 MILLIGRAM(S): 8 TABLET, FILM COATED ORAL at 20:58

## 2023-10-11 RX ADMIN — ENOXAPARIN SODIUM 150 MILLIGRAM(S): 100 INJECTION SUBCUTANEOUS at 17:42

## 2023-10-11 RX ADMIN — SODIUM CHLORIDE 100 MILLILITER(S): 9 INJECTION INTRAMUSCULAR; INTRAVENOUS; SUBCUTANEOUS at 18:28

## 2023-10-11 RX ADMIN — ONDANSETRON 4 MILLIGRAM(S): 8 TABLET, FILM COATED ORAL at 13:03

## 2023-10-11 RX ADMIN — LACOSAMIDE 100 MILLIGRAM(S): 50 TABLET ORAL at 17:42

## 2023-10-11 RX ADMIN — Medication 50 MILLIGRAM(S): at 17:54

## 2023-10-11 NOTE — ED PROVIDER NOTE - ATTENDING CONTRIBUTION TO CARE
53-year-old male with a history of  TIA, CVA, chronic left DVT c/b compartment syndrome s/p fasciotomy and R DVT on Lovenox who presents to the ED with dizziness and nausea.  Patient states that the symptoms started this morning at 945 when he woke up.  He went to bed at 10:30 PM and felt well at that time.  He states that his current symptoms are similar to his last stroke which was in November 2020.  Dizziness is worse with sitting up and moving head.  Was unable to ambulate this morning due to dizziness.  Walks with a walker at baseline.  Has residual foot drop from prior fasciotomy on the left.  Denies any recent illness, fever, chills, chest pain, shortness of breath, abdominal pain.  agree with above, pt woke up this morning when sitting up felt room spinning dizziness and nausea, feels improved at rest, simialr to prior stroke symptoms  neuro exam with nih 0, possibly vertigo vs cereberllar stroke, neuro at bedside, pending CT imaging and neuro recs

## 2023-10-11 NOTE — PATIENT PROFILE ADULT - STATED REASON FOR ADMISSION
Pt states reason for admission was from experiencing "extreme vertigo" trying to get out of bed in AM of 10/11

## 2023-10-11 NOTE — DISCHARGE NOTE PROVIDER - NSDCCPCAREPLAN_GEN_ALL_CORE_FT
PRINCIPAL DISCHARGE DIAGNOSIS  Diagnosis: Vertigo  Assessment and Plan of Treatment: You were admitted to the hospital due to diziness. We believe that this diziness was caused by peripheral vertigo. You were seen by Neurology in the hospital, and you underwetn CT scans of your brain. These scans did not show any signs of a stroke. Neurologys examination showed that you most likely have Vertigo. We treated you with a medication called Meclizine along with some IV Fluids. Please return to the hospital if you start to have severe headaches, or new neurologic deficits.   Please return to the hospital if you experience fever, chills, severe headache, dizziness, lightheadedness, loss of consciousness, visual disturbance, chest pain, palpitations, shortness of breath,  abdominal pain, nausea, vomiting, diarrhea, blood in your vomit/stool/urine, burning with urination or change in urinary pattern, numbness, weakness, tingling, or other alarming symptoms.  Please follow up with your Neurologist.        PRINCIPAL DISCHARGE DIAGNOSIS  Diagnosis: Vertigo  Assessment and Plan of Treatment: You were admitted to the hospital due to diziness. We believe that this diziness was caused by peripheral vertigo. You were seen by Neurology in the hospital, and you underwetn CT scans of your brain. These scans did not show any signs of a stroke. Neurologys examination showed that you most likely have Vertigo. We treated you with medications called Meclizine and Klonopin along with some IV Fluids. Please return to the hospital if you start to have severe headaches, or new neurologic deficits.   Please return to the hospital if you experience fever, chills, severe headache, dizziness, lightheadedness, loss of consciousness, visual disturbance, chest pain, palpitations, shortness of breath,  abdominal pain, nausea, vomiting, diarrhea, blood in your vomit/stool/urine, burning with urination or change in urinary pattern, numbness, weakness, tingling, or other alarming symptoms.  Please follow up with your Neurologist. Continue to take the medications we gave you as prescribed.

## 2023-10-11 NOTE — ED PROVIDER NOTE - PROGRESS NOTE DETAILS
neuro thinks this is peripheral vertigo. ct's neg. pt still dizzy, will now give reglan, if still dizzy, will admit to medicine for PT

## 2023-10-11 NOTE — H&P ADULT - PROBLEM SELECTOR PLAN 1
-Patient presenting with Dizziness since the AM. No other neurologic deficits.   -Etiology- Likely BPPV, imaging unremarkable.   -Patient received Meclizine 25mg x2  Plan  -Will start Meclizine 50mg BID   -Will order Klonopin 0.5mg Q8 hours PRN  -Will order Zofran PRN for Nausea  -Will start  cc/hr.   -Will defer any further imaging at this time.

## 2023-10-11 NOTE — ED PROVIDER NOTE - PHYSICAL EXAMINATION
Cristiana Izquierdo MD  GENERAL: Patient awake alert NAD.  HEENT: NC/AT, Moist mucous membranes, PERRL, EOMI.  LUNGS: CTAB, no wheezes or crackles.   CARDIAC: RRR, no m/r/g.    ABDOMEN: Soft, NT, ND, No rebound, guarding. No CVA tenderness.   EXT: No edema. No calf tenderness.  MSK: no pain with movement, no deformities.  NEURO: A&Ox3. Moving all extremities. cn 2-12 intact. diff ambulating 2/2 dizziness. no slurred speech  SKIN: Warm and dry. No rash.  PSYCH: Normal affect.

## 2023-10-11 NOTE — CONSULT NOTE ADULT - NS ATTEND AMEND GEN_ALL_CORE FT
HPI as per NP/PA note, personally verified by me. Patient with dizziness upon awakening this morning. It has been intermittent but worse with movement. He did have a similar bout in the past but this time is more intense. He received meclizine with some improvement. He denies any new focal neurologic deficits (old L foot drop and uses walker), no hearing changes, vision changes, or incoordination. Wife is concerned with his ability to ambulate.    Neurologic exam as per NP/PA note with additions as below:  AAO x2.75 (10/10/23 but rest intact), speech fluent  CN's II-XII intact. HITS (+) mildly with head turn to the RIGHT  Strength 5/5 all except for LLE DF 4/5, PF 4+/5 (chronic)  Sens intact all but some allodynia and hyperesthesia in distal BLE's (baseline)  FtN intact b/l with BUE mild intention tremor with postural component. HtS intact b/l but somewhat limited on LLE due to baseline weakness  Downgoing R and neutral L plantar response    < from: CT Brain Perfusion Maps Stroke (10.11.23 @ 12:15) >    CT brain:  No hydrocephalus, acute intracranial hemorrhage, mass effect, or brain   edema.    CT brain perfusion:  No perfusion abnormality    CTA brain:  No flow-limiting stenosis or vascular aneurysm. No AVM.    Venous system is well opacified, no evidence for venous sinus or cortical   vein thrombosis.    CTA neck:  No flow-limiting stenosis, evidence for arterial dissection, or vascular   aneurysm.    < end of copied text >      A/P:  Dizziness  Prior stroke  DVT x3 on Lovenox  Old L foot drop from compartment syndrome  Epilepsy, not intractable, without status epilepticus    - Dizziness seems most consistent with peripheral etiology given exam findings, head imaging without evidence of acute intracranial event, and improvement with meclizine. He denies any new focal neurologic deficits  - Continue symptomatic control  - No need for additional head imaging, such as MRI brain, at this time  - PT, as tolerated  - Vestibular rehab as outpatient  - Continue to address above medical problems, as you are doing  - Will sign off, please call [phone]) with additional questions or concerns

## 2023-10-11 NOTE — ED PROVIDER NOTE - CLINICAL SUMMARY MEDICAL DECISION MAKING FREE TEXT BOX
Felecia - Patient is a 53-year-old male with a history of  TIA, CVA, chronic left DVT c/b compartment syndrome s/p fasciotomy and R DVT on Lovenox who presents to the ED with dizziness and nausea. ddx includes but is not limited to TIA vs CVA vs peripheral vertigo. will check CT head, CTA head and neck, labs, give zofran and meclizine and reassess

## 2023-10-11 NOTE — H&P ADULT - HISTORY OF PRESENT ILLNESS
Patient is a 52 yo M w/ pmhx of CVA, DVT x 3 (b/l, L>R) on chronic Lovenox SQ inj, compartment syndrome status post fasciotomy (c/b L foot drop), hyperlipidemia, PFO, vertigo, seizure (semiology: unilateral "posturing" movements w/ vital sign changes and tongue biting/urinary incontinence) on Keppra 250 mg BID, Vimpat 100 mg BID, presents to [facility] ED as code stroke for dizziness. Patient reports that this morning he woke up with dizziness when he tried to get up. Along with this he had some nausea but no episodes of emesis. He had similar dizziness in the past, but the intensity this time was worse than during previous episodes. The dizziness immediately starts whenever he tries to get up, and resolves when he lays down again. He decided to come to the hospital, due to the dizziness not going away. He denied any other neurologic deficits. He denies any changes in vision, hearing, sensation or extremities strength.     Upon arrival to the ED, patients vitals were WNL and labs ere unremarkable. A CODE stroke was called on the patient and he was evaluated by Neurology. Imagining was unremarkable for any intracranial pathology and a [place]-Halpike maneuver by Neurology was diagnostic for Peripheral Vertigo. He was given Meclizine 25mg x2 along with Zofran and Reglan. Patient reports that he still has dizziness even after the Meclizine. He was seen trying to get up in bed, and immediately had dizziness again.

## 2023-10-11 NOTE — CONSULT NOTE ADULT - ASSESSMENT
INCOMPLETE     NIHSS: 0   mRS: 2     Patient is not a thrombolytic candidate because they are outside of the appropriate window of treatment.  Patient is not a mechanical thrombectomy candidate because no evidence of LVO.    Impression:  54 yo M w/ pmhx of CVA, DVT x 3 (b/l, L>R) on chronic Lovenox SQ inj, compartment syndrome status post fasciotomy (c/b L foot drop), hyperlipidemia, PFO, vertigo, seizure (semiology: unilateral "posturing" movements w/ vital sign changes and tongue biting/urinary incontinence) on Keppra 250 mg BID, Vimpat 100 mg BID, presents to [facility] ED as code stroke for dizziness. Patient's last known well is last night upon going to bed at 22:30. Patient awoke this morning and upon changing position had acute onset room spinning dizziness associated with nausea. The dizziness is positional and resolves when he lies still/does not move and is provoked upon sitting/standing up. Had similar episode on 5/2023 and was diagnosed with peripheral vertigo at [facility]. However, patient reports dizziness is worse than usual. Dizziness not resolving since onset as it reportedly did the last time. HINTS exam suggestive of peripheral etiology; Nish[place]-Hallpike diagnostic for L posterior canal BPPV. CTH/CTA/CTP negative for acute findings.     NIHSS: 0   mRS: 2     Patient is not a thrombolytic candidate because they are outside of the appropriate window of treatment.  Patient is not a mechanical thrombectomy candidate because no evidence of LVO.    Impression: Positional, intermittent room spinning dizziness likely vertigo from L sided peripheral vestibular dysfunction especially with positive HINTS exam and without acute focality otherwise; likely posterior canal BPPV given Mehoopany Gunn-Jackson positive on L side.     Recommendations     [] No further inpatient workup required at this time   [] Symptomatic management w/ IVF, Meclizine 25-50 mg BID  [] Can consider Clonzepam 0.5mg  q8 PRN if refractory to Meclizine,   [] Zofran 4 mg IVP for nausea, can consider Reglan 10 mg IVP (ekg prior to check qTc)  [] No objection to continuing the rest of patient's home medications at this time  [] outpatient [facility].   [] Upon discharge, patient should follow up with his neurologist, Dr. Forde:  [address] [place] [address]. Call [phone].     Patient case discussed with stroke fellow, Dr. Manriquez, under supervision of stroke attending, Dr. Pishanidar. Recommendations not finalized until attending attestation.     Please call with questions: [phone]

## 2023-10-11 NOTE — H&P ADULT - ATTENDING COMMENTS
53M with h/o DVT, CVA, seizure, vertigo presented with acute onset dizziness since this morning. no associated focal deficit. on exam, very dizzy with sitting. labs and vitals unremarkable. CT reviewed, no acute findings. appreciate neuro input. likely BPPV.     start standing Meclizine, Klonopin as needed. IV hydration.   monitor response to treatment.  c/w home meds.

## 2023-10-11 NOTE — H&P ADULT - NSHPLABSRESULTS_GEN_ALL_CORE
.  LABS:                         14.1   5.37  )-----------( 176      ( 11 Oct 2023 11:45 )             41.2     10-11    139  |  104  |  13  ----------------------------<  103<H>  3.9   |  26  |  0.96    Ca    8.9      11 Oct 2023 11:45    TPro  7.1  /  Alb  4.3  /  TBili  0.4  /  DBili  x   /  AST  19  /  ALT  34  /  AlkPhos  80  10-11    PT/INR - ( 11 Oct 2023 11:45 )   PT: 10.1 sec;   INR: <0.90 ratio         PTT - ( 11 Oct 2023 11:45 )  PTT:38.1 sec  Urinalysis Basic - ( 11 Oct 2023 11:45 )    Color: x / Appearance: x / SG: x / pH: x  Gluc: 103 mg/dL / Ketone: x  / Bili: x / Urobili: x   Blood: x / Protein: x / Nitrite: x   Leuk Esterase: x / RBC: x / WBC x   Sq Epi: x / Non Sq Epi: x / Bacteria: x            RADIOLOGY, EKG & ADDITIONAL TESTS: Reviewed.

## 2023-10-11 NOTE — ED ADULT NURSE NOTE - NSFALLHARMRISKINTERV_ED_ALL_ED
Assistance OOB with selected safe patient handling equipment if applicable/Assistance with ambulation/Communicate risk of Fall with Harm to all staff, patient, and family/Encourage patient to sit up slowly, dangle for a short time, stand at bedside before walking/Monitor gait and stability/Orthostatic vital signs/Provide patient with walking aids/Provide visual cue: red socks, yellow wristband, yellow gown, etc/Reinforce activity limits and safety measures with patient and family/Bed in lowest position, wheels locked, appropriate side rails in place/Call bell, personal items and telephone in reach/Instruct patient to call for assistance before getting out of bed/chair/stretcher/Non-slip footwear applied when patient is off stretcher/[place] to call system/Physically safe environment - no spills, clutter or unnecessary equipment/Purposeful Proactive Rounding/Room/bathroom lighting operational, light cord in reach

## 2023-10-11 NOTE — PATIENT PROFILE ADULT - FUNCTIONAL ASSESSMENT - BASIC MOBILITY 6.
2-calculated by average/Not able to assess (calculate score using [facility] averaging method)

## 2023-10-11 NOTE — ED ADULT TRIAGE NOTE - CHIEF COMPLAINT QUOTE
pt coming from home c/o dizziness and nausea, every time he attempted to get up falls over to left side. reports recent change in medications decrease in keppra and increase in vyvanse medication. pt last known normal 10:30. n facial asymmetry or focal deficits.  hx- cva and tia.

## 2023-10-11 NOTE — ED ADULT NURSE NOTE - OBJECTIVE STATEMENT
Stroke Nurse: Patient is a 54 yo M w/ pmhx of CVA, DVT x 3 (b/l, L>R) on chronic Lovenox SQ inj, compartment syndrome status post fasciotomy (c/b L foot drop), hyperlipidemia, PFO, vertigo, seizure,, presents to [facility] ED as code stroke for dizziness. Patient's last known well is last night upon going to bed at 22:30. Patient awoke this morning and upon changing position had acute onset room spinning dizziness associated with nausea. The dizziness is positional and resolves when he lies still/does not move and is provoked upon sitting/standing up.  At baseline patient ambulates with a walker since fasciotomy due to L foot drop, otherwise independent with ADLs. No toxic habits such as smoking, EtOH, illicit drug use. Report given to primary RN Jenn

## 2023-10-11 NOTE — H&P ADULT - NSHPPHYSICALEXAM_GEN_ALL_CORE
LOS:     VITALS:   T(C): 36.4 (10-11-23 @ 15:19), Max: 36.7 (10-11-23 @ 11:35)  HR: 65 (10-11-23 @ 15:19) (65 - 67)  BP: 117/71 (10-11-23 @ 15:19) (117/71 - 128/84)  RR: 16 (10-11-23 @ 15:19) (16 - 18)  SpO2: 98% (10-11-23 @ 15:19) (98% - 99%)    GENERAL: NAD  HEAD:  Atraumatic, Normocephalic  EYES: EOMI, PERRLA, conjunctiva and sclera clear  ENT: Moist mucous membranes  NECK: Supple, No elevated JVP.  CHEST/LUNG: Clear to auscultation bilaterally; No rales, rhonchi, or wheezes.   HEART: Regular rate and rhythm; No murmurs, rubs, or gallops  ABDOMEN: Bowel sounds present; Soft, nontender, nondistended  EXTREMITIES:  2+ Peripheral Pulses, brisk capillary refill. No clubbing, cyanosis, or edema  NERVOUS SYSTEM:  A&Ox3, no focal deficits   SKIN: No rashes or lesions

## 2023-10-11 NOTE — DISCHARGE NOTE PROVIDER - NSDCMRMEDTOKEN_GEN_ALL_CORE_FT
aspirin 81 mg oral delayed release tablet: 1 tab(s) orally once a day  atorvastatin 20 mg oral tablet: 1 tab(s) orally once a day  enoxaparin 150 mg/mL injectable solution: 150 milligram(s) subcutaneously once a day 30 day supply  gabapentin 300 mg oral capsule: 1 orally 3 times a day  Keppra 250 mg oral tablet: 1 tab(s) orally 2 times a day  Multiple Vitamins oral tablet: 1 tab(s) orally once a day  pantoprazole 40 mg oral delayed release tablet: 1 orally once a day  Vimpat 100 mg oral tablet: 1 tab(s) orally 2 times a day  Vyvanse 50 mg oral capsule: 1 cap(s) orally once a day   aspirin 81 mg oral delayed release tablet: 1 tab(s) orally once a day  atorvastatin 20 mg oral tablet: 1 tab(s) orally once a day  clonazePAM 0.5 mg oral tablet: 1 tab(s) orally every 12 hours as needed for  dizziness MDD: 1 mg  enoxaparin 150 mg/mL injectable solution: 150 milligram(s) subcutaneously once a day 30 day supply  gabapentin 300 mg oral capsule: 1 orally 3 times a day  Keppra 250 mg oral tablet: 1 tab(s) orally 2 times a day  meclizine 50 mg oral tablet: 1 tab(s) orally 2 times a day  Multiple Vitamins oral tablet: 1 tab(s) orally once a day  pantoprazole 40 mg oral delayed release tablet: 1 orally once a day  Vestibular Physical Therapy: 3/week, ICD 10: H81.10  Vimpat 100 mg oral tablet: 1 tab(s) orally 2 times a day  Vyvanse 50 mg oral capsule: 1 cap(s) orally once a day

## 2023-10-11 NOTE — CONSULT NOTE ADULT - SUBJECTIVE AND OBJECTIVE BOX
INCOMPLETE     HPI: Patient is a 54 yo M w/ pmhx of CVA, DVT x 3 (b/l, L>R) on chronic Lovenox SQ inj, compartment syndrome status post fasciotomy (c/b L foot drop), hyperlipidemia, PFO, vertigo, presents to [facility] ED as code stroke for dizziness. Patient's last known well is last night upon going to bed at 22:30. Patient awoke this morning and upon changing position had acute onset room spinning dizziness associated with nausea. The dizziness is positional and resolves when he lies still/does not move and is provoked upon sitting/standing up. Had similar episode on 5/2023 and was diagnosed with peripheral vertigo at [facility]. However, patient reports dizziness similar to when he experienced a stroke in the past. Dizziness not resolving since onset as it reportedly did the last time.  At baseline patient ambulates with a walker since fasciotomy due to L foot drop, otherwise independent with ADLs. No toxic habits such as smoking, EtOH, illicit drug use.     (Stroke only)  NIHSS: 0    MRS: 2    REVIEW OF SYSTEMS    A 10-system ROS was performed and is negative except for those items noted above and/or in the HPI.    PAST MEDICAL & SURGICAL HISTORY:  History of TIAs      CVA (cerebrovascular accident)      HLD (hyperlipidemia)      Vertigo      Unresponsiveness      History of fasciotomy        FAMILY HISTORY:    SOCIAL HISTORY:   T/E/D:   Occupation:   Lives with:     MEDICATIONS (HOME):  Home Medications:  aspirin 81 mg oral delayed release tablet: 1 tab(s) orally once a day (04 May 2023 17:37)  atorvastatin 40 mg oral tablet: 1 tab(s) orally once a day (04 May 2023 17:37)  gabapentin 300 mg oral capsule: 1 orally 3 times a day (04 May 2023 17:37)  Keppra: 1500 milligram(s) orally 2 times a day (04 May 2023 17:37)  Multiple Vitamins oral tablet: 1 tab(s) orally once a day (04 May 2023 17:37)  oxyCODONE 5 mg oral tablet: 1 tab(s) orally every 6 hours, As needed, Severe Pain (7 - 10) (04 May 2023 17:37)  pantoprazole 40 mg oral delayed release tablet: 1 orally once a day (04 May 2023 17:35)  Vimpat 100 mg oral tablet: 1 tab(s) orally 2 times a day (04 May 2023 17:37)    MEDICATIONS  (STANDING):  meclizine 25 milliGRAM(s) Oral Once  ondansetron Injectable 4 milliGRAM(s) IV Push once    MEDICATIONS  (PRN):    ALLERGIES/INTOLERANCES:  Allergies  No Known Allergies    Intolerances    VITALS & EXAMINATION:  Vital Signs Last 24 Hrs  T(C): 36.7 (11 Oct 2023 11:35), Max: 36.7 (11 Oct 2023 11:35)  T(F): 98 (11 Oct 2023 11:35), Max: 98 (11 Oct 2023 11:35)  HR: 67 (11 Oct 2023 11:35) (67 - 67)  BP: 123/79 (11 Oct 2023 11:35) (123/79 - 123/79)  BP(mean): --  RR: 18 (11 Oct 2023 11:35) (18 - 18)  SpO2: 99% (11 Oct 2023 11:35) (99% - 99%)    Parameters below as of 11 Oct 2023 11:35  Patient On (Oxygen Delivery Method): room air        General:  Constitutional: Male, appears stated age, in no apparent distress including pain  Head: Normocephalic & Atraumatic.  Respiratory: Breathing comfortably.    Neurological:  MS: Eyes open. Awake, alert, oriented to person, place, situation, time. Follows all commands.    Language: Speech is clear, fluent with good repetition & comprehension.    CNs: PERRL (R = 3mm, L = 3mm). VFF. EOMI R beating nystagmus on R gaze. V1-3 intact to LT b/l. No facial asymmetry b/l, full eye closure strength b/l. Hearing grossly normal (rubbing fingers) b/l. Tongue midline, normal movements, no atrophy.     Motor: Normal muscle bulk & tone. No noticeable tremor. No pronator drift. No drift of UE or LE b/l.               Deltoid	Biceps	Triceps	   R	         5	             5	              5	 5	  		 	  L	         5	             5	              5	 5	  		    	H-Flex	K-Flex	K-Ext	D-Flex	P-Flex  R	    5	            5	           5	            5	           5		   L	    5	            5	           5	            5	           5		     Sensation: Intact to LT b/l throughout.     Cortical: Extinction on DSS (neglect): none    Reflexes:              Biceps(C5)       BR(C6)     Triceps(C7)               Patellar(L4)    Achilles(S1)    Plantar Resp  R	              2	          2	             2		                              2		    2		      Down   L	              2	          2	             2		                              2		    2		      Down     Coordination: No dysmetria to FTN/HTS b/l.     Gait: Wide based paretic gait (due to L foot drop) mildly unsteady w/ 2 person assistance.       LABORATORY:  CBC                       14.1   5.37  )-----------( 176      ( 11 Oct 2023 11:45 )             41.2     Chem       LFTs   Coagulopathy   Lipid Panel   A1c   Cardiac enzymes     U/A   CSF  Immunological  Other    STUDIES & IMAGING:  Studies (EKG, EEG, EMG, etc):     Radiology (XR, CT, MR, U/S, TTE/MIMI): INCOMPLETE     HPI: Patient is a 52 yo M w/ pmhx of CVA, DVT x 3 (b/l, L>R) on chronic Lovenox SQ inj, compartment syndrome status post fasciotomy (c/b L foot drop), hyperlipidemia, PFO, vertigo, seizure (semiology: unilateral "posturing" movements w/ vital sign changes and tongue biting/urinary incontinence) on Keppra 250 mg BID, Vimpat 100 mg BID, presents to [facility] ED as code stroke for dizziness. Patient's last known well is last night upon going to bed at 22:30. Patient awoke this morning and upon changing position had acute onset room spinning dizziness associated with nausea. The dizziness is positional and resolves when he lies still/does not move and is provoked upon sitting/standing up. Had similar episode on 5/2023 and was diagnosed with peripheral vertigo at [facility]. However, patient reports dizziness similar to when he experienced a stroke in the past. Dizziness not resolving since onset as it reportedly did the last time.  At baseline patient ambulates with a walker since fasciotomy due to L foot drop, otherwise independent with ADLs. No toxic habits such as smoking, EtOH, illicit drug use.     (Stroke only)  NIHSS: 0    MRS: 2    REVIEW OF SYSTEMS    A 10-system ROS was performed and is negative except for those items noted above and/or in the HPI.    PAST MEDICAL & SURGICAL HISTORY:  History of TIAs      CVA (cerebrovascular accident)      HLD (hyperlipidemia)      Vertigo      Unresponsiveness      History of fasciotomy        FAMILY HISTORY:    SOCIAL HISTORY:   T/E/D:   Occupation:   Lives with:     MEDICATIONS (HOME):  Home Medications:  aspirin 81 mg oral delayed release tablet: 1 tab(s) orally once a day (04 May 2023 17:37)  atorvastatin 40 mg oral tablet: 1 tab(s) orally once a day (04 May 2023 17:37)  gabapentin 300 mg oral capsule: 1 orally 3 times a day (04 May 2023 17:37)  Keppra: 1500 milligram(s) orally 2 times a day (04 May 2023 17:37)  Multiple Vitamins oral tablet: 1 tab(s) orally once a day (04 May 2023 17:37)  oxyCODONE 5 mg oral tablet: 1 tab(s) orally every 6 hours, As needed, Severe Pain (7 - 10) (04 May 2023 17:37)  pantoprazole 40 mg oral delayed release tablet: 1 orally once a day (04 May 2023 17:35)  Vimpat 100 mg oral tablet: 1 tab(s) orally 2 times a day (04 May 2023 17:37)    MEDICATIONS  (STANDING):  meclizine 25 milliGRAM(s) Oral Once  ondansetron Injectable 4 milliGRAM(s) IV Push once    MEDICATIONS  (PRN):    ALLERGIES/INTOLERANCES:  Allergies  No Known Allergies    Intolerances    VITALS & EXAMINATION:  Vital Signs Last 24 Hrs  T(C): 36.7 (11 Oct 2023 11:35), Max: 36.7 (11 Oct 2023 11:35)  T(F): 98 (11 Oct 2023 11:35), Max: 98 (11 Oct 2023 11:35)  HR: 67 (11 Oct 2023 11:35) (67 - 67)  BP: 123/79 (11 Oct 2023 11:35) (123/79 - 123/79)  BP(mean): --  RR: 18 (11 Oct 2023 11:35) (18 - 18)  SpO2: 99% (11 Oct 2023 11:35) (99% - 99%)    Parameters below as of 11 Oct 2023 11:35  Patient On (Oxygen Delivery Method): room air        General:  Constitutional: Male, appears stated age, in no apparent distress including pain  Head: Normocephalic & Atraumatic.  Respiratory: Breathing comfortably.    Neurological:  MS: Eyes open. Awake, alert, oriented to person, place, situation, time. Follows all commands.    Language: Speech is clear, fluent with good repetition & comprehension.    CNs: PERRL (R = 3mm, L = 3mm). VFF. EOMI R beating nystagmus on R gaze. V1-3 intact to LT b/l. No facial asymmetry b/l, full eye closure strength b/l. Hearing grossly normal (rubbing fingers) b/l. Tongue midline, normal movements, no atrophy.     Motor: Normal muscle bulk & tone. No noticeable tremor. No pronator drift. No drift of UE or LE b/l.               Deltoid	Biceps	Triceps	   R	         5	             5	              5	 5	  		 	  L	         5	             5	              5	 5	  		    	H-Flex	K-Flex	K-Ext	D-Flex	P-Flex  R	    5	            5	           5	            5	           5		   L	    5	            5	           5	            5	           5		     Sensation: Intact to LT b/l throughout.     Cortical: Extinction on DSS (neglect): none    Reflexes:              Biceps(C5)       BR(C6)     Triceps(C7)               Patellar(L4)    Achilles(S1)    Plantar Resp  R	              2	          2	             2		                              2		    2		      Down   L	              2	          2	             2		                              2		    2		      Down     Coordination: No dysmetria to FTN/HTS b/l.     Gait: Wide based paretic gait (due to L foot drop) mildly unsteady w/ 2 person assistance.       LABORATORY:  CBC                       14.1   5.37  )-----------( 176      ( 11 Oct 2023 11:45 )             41.2     Chem       LFTs   Coagulopathy   Lipid Panel   A1c   Cardiac enzymes     U/A   CSF  Immunological  Other    STUDIES & IMAGING:  Studies (EKG, EEG, EMG, etc):     Radiology (XR, CT, MR, U/S, TTE/MIMI): HPI: Patient is a 52 yo M w/ pmhx of CVA, DVT x 3 (b/l, L>R) on chronic Lovenox SQ inj, compartment syndrome status post fasciotomy (c/b L foot drop), hyperlipidemia, PFO, vertigo, seizure (semiology: unilateral "posturing" movements w/ vital sign changes and tongue biting/urinary incontinence) on Keppra 250 mg BID, Vimpat 100 mg BID, presents to [facility] ED as code stroke for dizziness. Patient's last known well is last night upon going to bed at 22:30. Patient awoke this morning and upon changing position had acute onset room spinning dizziness associated with nausea. The dizziness is positional and resolves when he lies still/does not move and is provoked upon sitting/standing up. Had similar episode on 5/2023 and was diagnosed with peripheral vertigo at [facility]. However, patient reports dizziness is worse than usual. Dizziness not resolving since onset as it reportedly did the last time.  At baseline patient ambulates with a walker since fasciotomy due to L foot drop, otherwise independent with ADLs. No toxic habits such as smoking, EtOH, illicit drug use. Patient follows with neurologist, Dr. Forde, as an outpatient. Patient had first stroke in 2/2022 at [facility] when he had episode of slurred speech, visual deficits, +/- dizziness (patient denies the latter, but wife endorses hx) and was given thrombolytics; infarct found on imaging at that time. In 8/2022, patient had episode of AMS, went to [facility] and given thrombolytics; no infarct found on imaging but given diagnosis of tpa aborted stroke. Patient is compliant with his Lovenox & ASA which he takes every day. Also compliant with seizure medications. Denies any recent hx of unresponsiveness, tongue biting, urinary incontinence.     (Stroke only)  NIHSS: 0    MRS: 2    REVIEW OF SYSTEMS    A 10-system ROS was performed and is negative except for those items noted above and/or in the HPI.    PAST MEDICAL & SURGICAL HISTORY:  History of TIAs      CVA (cerebrovascular accident)      HLD (hyperlipidemia)      Vertigo      Unresponsiveness      History of fasciotomy        FAMILY HISTORY:    SOCIAL HISTORY:   T/E/D:   Occupation:   Lives with:     MEDICATIONS (HOME):  Home Medications:  aspirin 81 mg oral delayed release tablet: 1 tab(s) orally once a day (04 May 2023 17:37)  atorvastatin 40 mg oral tablet: 1 tab(s) orally once a day (04 May 2023 17:37)  gabapentin 300 mg oral capsule: 1 orally 3 times a day (04 May 2023 17:37)  Keppra: 1500 milligram(s) orally 2 times a day (04 May 2023 17:37)  Multiple Vitamins oral tablet: 1 tab(s) orally once a day (04 May 2023 17:37)  oxyCODONE 5 mg oral tablet: 1 tab(s) orally every 6 hours, As needed, Severe Pain (7 - 10) (04 May 2023 17:37)  pantoprazole 40 mg oral delayed release tablet: 1 orally once a day (04 May 2023 17:35)  Vimpat 100 mg oral tablet: 1 tab(s) orally 2 times a day (04 May 2023 17:37)    MEDICATIONS  (STANDING):  meclizine 25 milliGRAM(s) Oral Once  ondansetron Injectable 4 milliGRAM(s) IV Push once    MEDICATIONS  (PRN):    ALLERGIES/INTOLERANCES:  Allergies  No Known Allergies    Intolerances    VITALS & EXAMINATION:  Vital Signs Last 24 Hrs  T(C): 36.7 (11 Oct 2023 11:35), Max: 36.7 (11 Oct 2023 11:35)  T(F): 98 (11 Oct 2023 11:35), Max: 98 (11 Oct 2023 11:35)  HR: 67 (11 Oct 2023 11:35) (67 - 67)  BP: 123/79 (11 Oct 2023 11:35) (123/79 - 123/79)  BP(mean): --  RR: 18 (11 Oct 2023 11:35) (18 - 18)  SpO2: 99% (11 Oct 2023 11:35) (99% - 99%)    Parameters below as of 11 Oct 2023 11:35  Patient On (Oxygen Delivery Method): room air        General:  Constitutional: Male, appears stated age, in no apparent distress including pain  Head: Normocephalic & Atraumatic.  Respiratory: Breathing comfortably.    Neurological:  MS: Eyes open. Awake, alert, oriented to person, place, situation, time. Follows all commands.    Language: Speech is clear, fluent with good repetition & comprehension.    CNs: PERRL (R = 3mm, L = 3mm). VFF. EOMI R beating nystagmus on R gaze. HINTS exam also revealing corrective saccade to the R when head turned briskly to the L. No hypertropia on test of skew. Nishant Hallpike reveals torsional L sided nystagmus when head is turned down to the L with dizziness more accentuated. No provocation on dizziness with Nishant-Hallpike to the R side. V1-3 intact to LT b/l. No facial asymmetry b/l, full eye closure strength b/l. Hearing grossly normal (rubbing fingers) b/l. Tongue midline, normal movements, no atrophy.     Motor: Normal muscle bulk & tone. No noticeable tremor. No pronator drift. No drift of UE or LE b/l.               Deltoid	Biceps	Triceps	   R	         5	      5	   5	 5	  		 	  L	         5	      5	   5	 5	  		    	H-Flex	K-Flex	K-Ext	D-Flex	P-Flex  R	    5	  5	   5	  5	    5		   L	    5	   5	   5	  4	    5		   *Some pain limitation on LLE.     Sensation: Intact to LT b/l throughout.     Cortical: Extinction on DSS (neglect): none    Reflexes:              Biceps(C5)       BR(C6)     Triceps(C7)               Patellar(L4)    Achilles(S1)    Plantar Resp  R	              3	          3	             3		 3		    3	Withdrawal       L	              3	          3	             3		 2		    1	Withdrawal    Coordination: No dysmetria to FTN/HTS b/l.     Gait: Wide based paretic gait (due to L foot drop) mildly unsteady w/ 2 person assistance.         LABORATORY:  CBC                       14.1   5.37  )-----------( 176      ( 11 Oct 2023 11:45 )             41.2     Chem       LFTs   Coagulopathy   Lipid Panel   A1c   Cardiac enzymes     U/A   CSF  Immunological  Other    STUDIES & IMAGING:  Studies (EKG, EEG, EMG, etc):     Radiology (XR, CT, MR, U/S, TTE/MIMI):    CT BRAIN:    No hydrocephalus, mass effect, midline shift, acute intracranial   hemorrhage, or brain edema.    Similar-appearing 6 mm coarse calcification without surrounding edema in   the central basis pontis..    Visualized paranasal sinuses and mastoid air cells are clear.    CT BRAIN PERFUSION:    Cerebral blood flow less than 30% = 0 mL.    Tmax greater than 6 seconds = 0 mL.    CTA BRAIN:    Normal flow-related enhancement of the skullbase/intracranial internal   carotid arteries.    Normal flow-related enhancement of the bilateral anterior, middle, and   posterior cerebral arteries.    Anterior communicating artery is present.    Normal flow-related enhancement of the bilateral intradural vertebral   arteries and the basilar artery.    No flow-limiting stenosis or vascular aneurysm. No AVM.    Venous system is well opacified, no evidence for venous sinus or cortical   vein thrombosis.    CTA NECK:    Normal flow-related enhancement of the bilateral common and internal   carotid arteries.    Normal flow-related enhancement of the bilateral vertebral arteries.    No flow-limiting stenosis, evidence for arterial dissection, or vascular   aneurysm.    IMPRESSION:    CT brain:  No hydrocephalus, acute intracranial hemorrhage, mass effect, or brain   edema.    CT brain perfusion:  No perfusion abnormality    CTA brain:  No flow-limiting stenosis or vascular aneurysm. No AVM.    Venous system is well opacified, no evidence for venous sinus or cortical   vein thrombosis.    CTA neck:  No flow-limiting stenosis, evidence for arterial dissection, or vascular   aneurysm.    Dr. Knutson discussed these findings with Dr. Izquierdo on 10/11/2023 12:12   PM with read back.    --- End of Report ---   HPI: Patient is a 54 yo M w/ pmhx of CVA, DVT x 3 (b/l, L>R) on chronic Lovenox SQ inj, compartment syndrome status post fasciotomy (c/b L foot drop), hyperlipidemia, PFO, vertigo, seizure (semiology: unilateral "posturing" movements w/ vital sign changes and tongue biting/urinary incontinence) on Keppra 250 mg BID, Vimpat 100 mg BID, presents to [facility] ED as code stroke for dizziness. Patient's last known well is last night upon going to bed at 22:30. Patient awoke this morning and upon changing position had acute onset room spinning dizziness associated with nausea. The dizziness is positional and resolves when he lies still/does not move and is provoked upon sitting/standing up. Had similar episode on 5/2023 and was diagnosed with peripheral vertigo at [facility]. However, patient reports dizziness is worse than usual. Dizziness not resolving since onset as it reportedly did the last time.  At baseline patient ambulates with a walker since fasciotomy due to L foot drop, otherwise independent with ADLs. No toxic habits such as smoking, EtOH, illicit drug use. Patient follows with neurologist, Dr. Forde, as an outpatient. Patient had first stroke in 2/2022 at [facility] when he had episode of slurred speech, visual deficits, +/- dizziness (patient denies the latter, but wife endorses hx) and was given thrombolytics; infarct found on imaging at that time. In 8/2022, patient had episode of AMS, went to [facility] and given thrombolytics; no infarct found on imaging but given diagnosis of tpa aborted stroke. Patient is compliant with his Lovenox & ASA which he takes every day. Also compliant with seizure medications. Denies any recent hx of unresponsiveness, tongue biting, urinary incontinence.     (Stroke only)  NIHSS: 0    MRS: 2    REVIEW OF SYSTEMS    A 10-system ROS was performed and is negative except for those items noted above and/or in the HPI.    PAST MEDICAL & SURGICAL HISTORY:  History of TIAs      CVA (cerebrovascular accident)      HLD (hyperlipidemia)      Vertigo      Unresponsiveness      History of fasciotomy        FAMILY HISTORY: Non-contributory    SOCIAL HISTORY:   T/E/D: No reports of current tobacco, alcohol, or illicit drug use  Occupation:   Lives with: wife    MEDICATIONS (HOME):  Home Medications:  aspirin 81 mg oral delayed release tablet: 1 tab(s) orally once a day (04 May 2023 17:37)  atorvastatin 40 mg oral tablet: 1 tab(s) orally once a day (04 May 2023 17:37)  gabapentin 300 mg oral capsule: 1 orally 3 times a day (04 May 2023 17:37)  Keppra: 1500 milligram(s) orally 2 times a day (04 May 2023 17:37)  Multiple Vitamins oral tablet: 1 tab(s) orally once a day (04 May 2023 17:37)  oxyCODONE 5 mg oral tablet: 1 tab(s) orally every 6 hours, As needed, Severe Pain (7 - 10) (04 May 2023 17:37)  pantoprazole 40 mg oral delayed release tablet: 1 orally once a day (04 May 2023 17:35)  Vimpat 100 mg oral tablet: 1 tab(s) orally 2 times a day (04 May 2023 17:37)    MEDICATIONS  (STANDING):  meclizine 25 milliGRAM(s) Oral Once  ondansetron Injectable 4 milliGRAM(s) IV Push once    MEDICATIONS  (PRN):    ALLERGIES/INTOLERANCES:  Allergies  No Known Allergies    Intolerances    VITALS & EXAMINATION:  Vital Signs Last 24 Hrs  T(C): 36.7 (11 Oct 2023 11:35), Max: 36.7 (11 Oct 2023 11:35)  T(F): 98 (11 Oct 2023 11:35), Max: 98 (11 Oct 2023 11:35)  HR: 67 (11 Oct 2023 11:35) (67 - 67)  BP: 123/79 (11 Oct 2023 11:35) (123/79 - 123/79)  BP(mean): --  RR: 18 (11 Oct 2023 11:35) (18 - 18)  SpO2: 99% (11 Oct 2023 11:35) (99% - 99%)    Parameters below as of 11 Oct 2023 11:35  Patient On (Oxygen Delivery Method): room air        General:  Constitutional: Male, appears stated age, in no apparent distress including pain  Head: Normocephalic & Atraumatic.  Respiratory: Breathing comfortably.  CV: Peripheral pulses palpable, no edema  Eyes: Fundoscopy not well visualized    Neurological:  MS: Eyes open. Awake, alert, oriented to person, place, situation, time. Follows all commands. Attention/concentration, recent and remote memory, and fund of knowledge intact    Language: Speech is clear, fluent with good repetition & comprehension.    CNs: PERRL (R = 3mm, L = 3mm). VFF. EOMI R beating nystagmus on R gaze. HINTS exam also revealing corrective saccade to the R when head turned briskly to the L. No hypertropia on test of skew. Nishant Hallpike reveals torsional L sided nystagmus when head is turned down to the L with dizziness more accentuated. No provocation on dizziness with Nishant-Hallpike to the R side. V1-3 intact to LT b/l. No facial asymmetry b/l, full eye closure strength b/l. Hearing grossly normal (rubbing fingers) b/l. Tongue midline, normal movements, no atrophy. Palate with symmetric elevation. Trap 5/5 b/l    Motor: Normal muscle bulk & tone. No noticeable tremor. No pronator drift. No drift of UE or LE b/l.               Deltoid	Biceps	Triceps	   R	         5	      5	   5	 5	  		 	  L	         5	      5	   5	 5	  		    	H-Flex	K-Flex	K-Ext	D-Flex	P-Flex  R	    5	  5	   5	  5	    5		   L	    5	   5	   5	  4	    5		   *Some pain limitation on LLE.     Sensation: Intact to LT b/l throughout.     Cortical: Extinction on DSS (neglect): none    Reflexes:              Biceps(C5)       BR(C6)     Triceps(C7)               Patellar(L4)    Achilles(S1)    Plantar Resp  R	              3	          3	             3		 3		    3	Withdrawal       L	              3	          3	             3		 2		    1	Withdrawal    Coordination: No dysmetria to FTN/HTS b/l.     Gait: Wide based paretic gait (due to L foot drop) mildly unsteady w/ 2 person assistance. Romberg (-)        LABORATORY:  CBC                       14.1   5.37  )-----------( 176      ( 11 Oct 2023 11:45 )             41.2     Chem       LFTs   Coagulopathy   Lipid Panel   A1c   Cardiac enzymes     U/A   CSF  Immunological  Other    STUDIES & IMAGING:  Studies (EKG, EEG, EMG, etc):     Radiology (XR, CT, MR, U/S, TTE/MIMI):    CT BRAIN:    No hydrocephalus, mass effect, midline shift, acute intracranial   hemorrhage, or brain edema.    Similar-appearing 6 mm coarse calcification without surrounding edema in   the central basis pontis..    Visualized paranasal sinuses and mastoid air cells are clear.    CT BRAIN PERFUSION:    Cerebral blood flow less than 30% = 0 mL.    Tmax greater than 6 seconds = 0 mL.    CTA BRAIN:    Normal flow-related enhancement of the skullbase/intracranial internal   carotid arteries.    Normal flow-related enhancement of the bilateral anterior, middle, and   posterior cerebral arteries.    Anterior communicating artery is present.    Normal flow-related enhancement of the bilateral intradural vertebral   arteries and the basilar artery.    No flow-limiting stenosis or vascular aneurysm. No AVM.    Venous system is well opacified, no evidence for venous sinus or cortical   vein thrombosis.    CTA NECK:    Normal flow-related enhancement of the bilateral common and internal   carotid arteries.    Normal flow-related enhancement of the bilateral vertebral arteries.    No flow-limiting stenosis, evidence for arterial dissection, or vascular   aneurysm.    IMPRESSION:    CT brain:  No hydrocephalus, acute intracranial hemorrhage, mass effect, or brain   edema.    CT brain perfusion:  No perfusion abnormality    CTA brain:  No flow-limiting stenosis or vascular aneurysm. No AVM.    Venous system is well opacified, no evidence for venous sinus or cortical   vein thrombosis.    CTA neck:  No flow-limiting stenosis, evidence for arterial dissection, or vascular   aneurysm.    Dr. Knutson discussed these findings with Dr. Izquierdo on 10/11/2023 12:12   PM with read back.    --- End of Report ---

## 2023-10-11 NOTE — H&P ADULT - PROBLEM SELECTOR PLAN 2
-Most recent Seizure in December 2022  -Will continue home medications      Vimpat 100mg BID      Keppra 250mg BID

## 2023-10-11 NOTE — PATIENT PROFILE ADULT - FALL HARM RISK - HARM RISK INTERVENTIONS

## 2023-10-11 NOTE — ED ADULT NURSE REASSESSMENT NOTE - NS ED NURSE REASSESS COMMENT FT1
Pt in NAD, VSS, will continue to monitor.
Pt in NAD, neuro at bedside performing assessment, will continue to monitor.
Report received from code stroke RN, pt in NAD, will continue to monitor.
received report from  MARK sullivan. Pt is a/o x 3. pt with no neuro or sensory deficits. pt c/o nausea consistent with chief complaint. pt medicated as per md orders. PETER. report given to floor mark martinez. pt no complaints of chest pain, headache, dizziness, vomiting, SOB, fever, chills   verbalized. Pt has 18g iv placed to right AC with no redness or swelling noted. maintenance fluids infusing at 100cc/hr consistent with MAR. pt pending transport to floor. 9s 945b. pt respirations even and unlabored with no accessory muscle use.
Patient is currently resting in 1a. Patient complains of dizziness that has not been resolved. Patient states he feels better when the HOB is flat. Patient updated on current plan of care and verbalized understanding and complied. Will continue to monitor.

## 2023-10-11 NOTE — DISCHARGE NOTE PROVIDER - HOSPITAL COURSE
Patient is a 52 yo M w/ pmhx of CVA, DVT x 3 (b/l, L>R) on chronic Lovenox SQ inj, compartment syndrome status post fasciotomy (c/b L foot drop), hyperlipidemia, PFO, vertigo, seizure (semiology: unilateral "posturing" movements w/ vital sign changes and tongue biting/urinary incontinence) on Keppra 250 mg BID, Vimpat 100 mg BID, presents to [facility] ED as code stroke for dizziness. Patient reports that this morning he woke up with dizziness when he tried to get up. Along with this he had some nausea but no episodes of emesis. He had similar dizziness in the past, but the intensity this time was worse than during previous episodes. The dizziness immediately starts whenever he tries to get up, and resolves when he lays down again. He decided to come to the hospital, due to the dizziness not going away. He denied any other neurologic deficits. He denies any changes in vision, hearing, sensation or extremities strength. Upon arrival to the ED, patients vitals were WNL and labs ere unremarkable. A CODE stroke was called on the patient and he was evaluated by Neurology. Imagining was unremarkable for any intracranial pathology and a Parsons-Halpike maneuver by Neurology was diagnostic for Peripheral Vertigo. He was given Meclizine 25mg x2 along with Zofran and Reglan. Patients dizziness persisted afterwards, so he was admitted. Patient was started on Meclizine 50mg BID, along with IVF. Patient is a 54 yo M w/ pmhx of CVA, DVT x 3 (b/l, L>R) on chronic Lovenox SQ inj, compartment syndrome status post fasciotomy (c/b L foot drop), hyperlipidemia, PFO, vertigo, seizure (semiology: unilateral "posturing" movements w/ vital sign changes and tongue biting/urinary incontinence) on Keppra 250 mg BID, Vimpat 100 mg BID, presents to [facility] ED as code stroke for dizziness. Patient reports that this morning he woke up with dizziness when he tried to get up. Along with this he had some nausea but no episodes of emesis. He had similar dizziness in the past, but the intensity this time was worse than during previous episodes. The dizziness immediately starts whenever he tries to get up, and resolves when he lays down again. He decided to come to the hospital, due to the dizziness not going away. He denied any other neurologic deficits. He denies any changes in vision, hearing, sensation or extremities strength. Upon arrival to the ED, patients vitals were WNL and labs ere unremarkable. A CODE stroke was called on the patient and he was evaluated by Neurology. Imagining was unremarkable for any intracranial pathology and a Cowgill-Halpike maneuver by Neurology was diagnostic for Peripheral Vertigo. He was given Meclizine 25mg x2 along with Zofran and Reglan. Patients dizziness persisted afterwards, so he was admitted. Patient was started on Meclizine 50mg BID, along with IVF. Patients nausea persisted even after the Meclizine, due to which he was started on Klonopin. Patients dizziness improved with Klonopin. He was evaluated by PT, and patient was recommended to be discharged to inpatient Rehab. Patient decided that he would rather benefit from being sent home. Patients symptoms' improved and he was discharged home. Patient was discharged home with a short course of PRN Meclizine and Klonopin. Patient will also be given a prescription for Vestibular rehab.

## 2023-10-11 NOTE — H&P ADULT - ASSESSMENT
Patient is a 54 yo M w/ pmhx of CVA, DVT x 3 (b/l, L>R) on chronic Lovenox SQ inj, compartment syndrome status post fasciotomy (c/b L foot drop), hyperlipidemia, PFO, vertigo, seizure (semiology: unilateral "posturing" movements w/ vital sign changes and tongue biting/urinary incontinence) presents to [facility] ED as code stroke for dizziness. Patient has been evaluated by Neurology, and the likely cause of her Dizziness is Peripheral vertigo. Patient is not having any focal neurologic deficits and remains hemodynamically stable.

## 2023-10-11 NOTE — DISCHARGE NOTE PROVIDER - NSDCFUSCHEDAPPT_GEN_ALL_CORE_FT
Conner Rios  [facility]  ORTHOSURG [address] [facility]  Scheduled Appointment: 10/16/2023    [facility]  DIAGRAD [address] [facility]  Scheduled Appointment: 10/26/2023    Arya Esqueda  [facility]  GASTRO [address]  Scheduled Appointment: 11/07/2023     Conner Rios  [facility]  ORTHOSURG [address] [facility]  Scheduled Appointment: 10/23/2023    [facility]  DIAGRAD [address] [facility]  Scheduled Appointment: 10/26/2023    Arya Esqueda  [facility]  GASTRO [address]  Scheduled Appointment: 11/07/2023

## 2023-10-11 NOTE — ED ADULT NURSE REASSESSMENT NOTE - HEART RATE (BEATS/MIN)
Patient is scheduled for a colonoscopy with Dr Copeland on 11/16/20. Please send Movi prep to patient's selected pharmacy.    Thank you,  GI Preadmit Surgery Scheduler  
Patient is scheduled for a colonoscopy with Dr Copeland on 11/17/20. Please send Movi prep to patient's selected pharmacy.    Thank you,  GI Preadmit Surgery Scheduler  
Reschedule
65

## 2023-10-11 NOTE — ED PROVIDER NOTE - OBJECTIVE STATEMENT
Patient is a 53-year-old male with a history of  TIA, CVA, chronic left DVT c/b compartment syndrome s/p fasciotomy and R DVT on Lovenox who presents to the ED with dizziness and nausea.  Patient states that the symptoms started this morning at 945 when he woke up.  He went to bed at 10:30 PM and felt well at that time.  He states that his current symptoms are similar to his last stroke which was in November 2020.  Dizziness is worse with sitting up and moving head.  Was unable to ambulate this morning due to dizziness.  Walks with a walker at baseline.  Has residual foot drop from prior fasciotomy on the left.  Denies any recent illness, fever, chills, chest pain, shortness of breath, abdominal pain.

## 2023-10-11 NOTE — DISCHARGE NOTE PROVIDER - CARE PROVIDER_API CALL
Braxton Forde  Neurology  [address] [facility] [address], [address]  [place] [address]  Phone: [phone]  Fax: [phone]  Follow Up Time:

## 2023-10-11 NOTE — DISCHARGE NOTE PROVIDER - NSDCCPTREATMENT_GEN_ALL_CORE_FT
PRINCIPAL PROCEDURE  Procedure: CTA head w/w/o contrast  Findings and Treatment: FINDINGS:  CT BRAIN:  No hydrocephalus, mass effect, midline shift, acute intracranial   hemorrhage, or brain edema.  Similar-appearing 6 mm coarse calcification without surrounding edema in   the central basis pontis..  Visualized paranasal sinuses and mastoid air cells are clear.  CT BRAIN PERFUSION:  Cerebral blood flow less than 30% = 0 mL.  Tmax greater than 6 seconds = 0 mL.  CTA BRAIN:  Normal flow-related enhancement of the skull base/intracranial internal   carotid arteries.  Normal flow-related enhancement of the bilateral anterior, middle, and   posterior cerebral arteries.  Anterior communicating artery is present.  Normal flow-related enhancement of the bilateral intradural vertebral   arteries and the basilar artery.  No flow-limiting stenosis or vascular aneurysm. No AVM.  Venous system is well opacified, no evidence for venous sinus or cortical   vein thrombosis.  CTA NECK:  Normal flow-related enhancement of the bilateral common and internal   carotid arteries.  Normal flow-related enhancement of the bilateral vertebral arteries.  No flow-limiting stenosis, evidence for arterial dissection, or vascular   aneurysm.  IMPRESSION:  CT brain:  No hydrocephalus, acute intracranial hemorrhage, mass effect, or brain   edema.  CT brain perfusion:  No perfusion abnormality  CTA brain:  No flow-limiting stenosis or vascular aneurysm. No AVM.  Venous system is well opacified, no evidence for venous sinus or cortical   vein thrombosis.  CTA neck:  No flow-limiting stenosis, evidence for arterial dissection, or vascular   aneurysm.

## 2023-10-11 NOTE — H&P ADULT - NSHPREVIEWOFSYSTEMS_GEN_ALL_CORE
REVIEW OF SYSTEMS:    CONSTITUTIONAL: No weakness, fevers or chills  EYES/ENT: No visual changes;  Is having vertigo   NECK: No pain or stiffness  RESPIRATORY: No cough, wheezing, hemoptysis; No shortness of breath  CARDIOVASCULAR: No chest pain or palpitations  GASTROINTESTINAL: No abdominal or epigastric pain. No nausea, vomiting, or hematemesis; No diarrhea or constipation. No melena or hematochezia.  GENITOURINARY: No dysuria, frequency or hematuria  NEUROLOGICAL: No numbness or weakness  SKIN: No itching, rashes

## 2023-10-12 LAB
ALBUMIN SERPL ELPH-MCNC: 3.5 G/DL — SIGNIFICANT CHANGE UP (ref 3.3–5)
ALP SERPL-CCNC: 67 U/L — SIGNIFICANT CHANGE UP (ref 40–120)
ALT FLD-CCNC: 25 U/L — SIGNIFICANT CHANGE UP (ref 4–41)
ANION GAP SERPL CALC-SCNC: 9 MMOL/L — SIGNIFICANT CHANGE UP (ref 7–14)
AST SERPL-CCNC: 15 U/L — SIGNIFICANT CHANGE UP (ref 4–40)
BILIRUB SERPL-MCNC: 0.2 MG/DL — SIGNIFICANT CHANGE UP (ref 0.2–1.2)
BUN SERPL-MCNC: 10 MG/DL — SIGNIFICANT CHANGE UP (ref 7–23)
CALCIUM SERPL-MCNC: 8.5 MG/DL — SIGNIFICANT CHANGE UP (ref 8.4–10.5)
CHLORIDE SERPL-SCNC: 111 MMOL/L — HIGH (ref 98–107)
CO2 SERPL-SCNC: 23 MMOL/L — SIGNIFICANT CHANGE UP (ref 22–31)
CREAT SERPL-MCNC: 0.92 MG/DL — SIGNIFICANT CHANGE UP (ref 0.5–1.3)
EGFR: 99 ML/MIN/1.73M2 — SIGNIFICANT CHANGE UP
GLUCOSE SERPL-MCNC: 96 MG/DL — SIGNIFICANT CHANGE UP (ref 70–99)
HCT VFR BLD CALC: 38.5 % — LOW (ref 39–50)
HGB BLD-MCNC: 13.1 G/DL — SIGNIFICANT CHANGE UP (ref 13–17)
MAGNESIUM SERPL-MCNC: 2.1 MG/DL — SIGNIFICANT CHANGE UP (ref 1.6–2.6)
MCHC RBC-ENTMCNC: 29.8 PG — SIGNIFICANT CHANGE UP (ref 27–34)
MCHC RBC-ENTMCNC: 34 GM/DL — SIGNIFICANT CHANGE UP (ref 32–36)
MCV RBC AUTO: 87.5 FL — SIGNIFICANT CHANGE UP (ref 80–100)
NRBC # BLD: 0 /100 WBCS — SIGNIFICANT CHANGE UP (ref 0–0)
NRBC # FLD: 0 K/UL — SIGNIFICANT CHANGE UP (ref 0–0)
PHOSPHATE SERPL-MCNC: 3.3 MG/DL — SIGNIFICANT CHANGE UP (ref 2.5–4.5)
PLATELET # BLD AUTO: 160 K/UL — SIGNIFICANT CHANGE UP (ref 150–400)
POTASSIUM SERPL-MCNC: 3.9 MMOL/L — SIGNIFICANT CHANGE UP (ref 3.5–5.3)
POTASSIUM SERPL-SCNC: 3.9 MMOL/L — SIGNIFICANT CHANGE UP (ref 3.5–5.3)
PROT SERPL-MCNC: 6 G/DL — SIGNIFICANT CHANGE UP (ref 6–8.3)
RBC # BLD: 4.4 M/UL — SIGNIFICANT CHANGE UP (ref 4.2–5.8)
RBC # FLD: 12.8 % — SIGNIFICANT CHANGE UP (ref 10.3–14.5)
SODIUM SERPL-SCNC: 143 MMOL/L — SIGNIFICANT CHANGE UP (ref 135–145)
WBC # BLD: 5.22 K/UL — SIGNIFICANT CHANGE UP (ref 3.8–10.5)
WBC # FLD AUTO: 5.22 K/UL — SIGNIFICANT CHANGE UP (ref 3.8–10.5)

## 2023-10-12 PROCEDURE — 99233 SBSQ HOSP IP/OBS HIGH 50: CPT | Mod: GC

## 2023-10-12 RX ORDER — SODIUM CHLORIDE 9 MG/ML
1000 INJECTION INTRAMUSCULAR; INTRAVENOUS; SUBCUTANEOUS
Refills: 0 | Status: DISCONTINUED | OUTPATIENT
Start: 2023-10-12 | End: 2023-10-13

## 2023-10-12 RX ORDER — CLONAZEPAM 1 MG
0.5 TABLET ORAL ONCE
Refills: 0 | Status: DISCONTINUED | OUTPATIENT
Start: 2023-10-12 | End: 2023-10-12

## 2023-10-12 RX ORDER — CLONAZEPAM 1 MG
0.5 TABLET ORAL EVERY 12 HOURS
Refills: 0 | Status: DISCONTINUED | OUTPATIENT
Start: 2023-10-12 | End: 2023-10-13

## 2023-10-12 RX ADMIN — ENOXAPARIN SODIUM 150 MILLIGRAM(S): 100 INJECTION SUBCUTANEOUS at 18:29

## 2023-10-12 RX ADMIN — LACOSAMIDE 100 MILLIGRAM(S): 50 TABLET ORAL at 05:23

## 2023-10-12 RX ADMIN — LACOSAMIDE 100 MILLIGRAM(S): 50 TABLET ORAL at 18:25

## 2023-10-12 RX ADMIN — ATORVASTATIN CALCIUM 20 MILLIGRAM(S): 80 TABLET, FILM COATED ORAL at 21:50

## 2023-10-12 RX ADMIN — LEVETIRACETAM 250 MILLIGRAM(S): 250 TABLET, FILM COATED ORAL at 18:25

## 2023-10-12 RX ADMIN — Medication 0.5 MILLIGRAM(S): at 21:47

## 2023-10-12 RX ADMIN — Medication 0.5 MILLIGRAM(S): at 11:37

## 2023-10-12 RX ADMIN — LISDEXAMFETAMINE DIMESYLATE 50 MILLIGRAM(S): 70 CAPSULE ORAL at 13:04

## 2023-10-12 RX ADMIN — LEVETIRACETAM 250 MILLIGRAM(S): 250 TABLET, FILM COATED ORAL at 05:23

## 2023-10-12 RX ADMIN — GABAPENTIN 300 MILLIGRAM(S): 400 CAPSULE ORAL at 05:23

## 2023-10-12 RX ADMIN — Medication 50 MILLIGRAM(S): at 05:23

## 2023-10-12 RX ADMIN — Medication 50 MILLIGRAM(S): at 18:25

## 2023-10-12 RX ADMIN — PANTOPRAZOLE SODIUM 40 MILLIGRAM(S): 20 TABLET, DELAYED RELEASE ORAL at 06:36

## 2023-10-12 RX ADMIN — Medication 81 MILLIGRAM(S): at 13:04

## 2023-10-12 RX ADMIN — GABAPENTIN 300 MILLIGRAM(S): 400 CAPSULE ORAL at 13:16

## 2023-10-12 RX ADMIN — Medication 75 MILLIGRAM(S): at 18:25

## 2023-10-12 RX ADMIN — SODIUM CHLORIDE 100 MILLILITER(S): 9 INJECTION INTRAMUSCULAR; INTRAVENOUS; SUBCUTANEOUS at 14:44

## 2023-10-12 NOTE — PHYSICAL THERAPY INITIAL EVALUATION ADULT - MD/RN NOTIFIED
LVM for patient with follow up appointment 12/08/2022 @ 1300.   Advised to utilize ice and heat, Tylenol and ibuprofen and to begin physical therapy.  Requested the patient return call to the office for any questions or scheduling concerns a follow-up appointment   yes

## 2023-10-12 NOTE — PROGRESS NOTE ADULT - PROBLEM SELECTOR PLAN 1
-Patient presenting with Dizziness since the AM. No other neurologic deficits.   -Etiology- Likely BPPV, imaging unremarkable.   -Patient received Meclizine 25mg x2  Plan  -Will start Meclizine 50mg BID   -Will order Klonopin 0.5mg Q8 hours PRN  -Will order Zofran PRN for Nausea  -Will start  cc/hr.   -Will defer any further imaging at this time. sb  -Patient presenting with Dizziness since the AM. No other neurologic deficits.   -Etiology- Likely BPPV, imaging unremarkable.   -Patient received Meclizine 25mg x2  Plan  -Continue Meclizine 50mg BID   -Continue Klonopin 0.5mg Q8 hours PRN- ordered 1 time dose to see if it will help with symptoms   -Continue Zofran PRN for Nausea  -Will defer any further imaging at this time.

## 2023-10-12 NOTE — PROGRESS NOTE ADULT - NS ATTEND AMEND GEN_ALL_CORE FT
See previous documentation, patient has already started ciclopirox treatment and noting improvement. Result released to [facility] per patient request.  Dr. Paz Sis.
Braxton Forde MD  Vascular Neurology  Office: [phone]

## 2023-10-12 NOTE — PROGRESS NOTE ADULT - SUBJECTIVE AND OBJECTIVE BOX
PROGRESS NOTE:   Authored by Pedro Guthrie MD  Patient is a 53y old  Male who presents with a chief complaint of Vertigo (11 Oct 2023 18:46)      SUBJECTIVE / OVERNIGHT EVENTS:  No acute events overnight.     ADDITIONAL REVIEW OF SYSTEMS:    MEDICATIONS  (STANDING):  aspirin enteric coated 81 milliGRAM(s) Oral daily  atorvastatin 20 milliGRAM(s) Oral at bedtime  enoxaparin Injectable 150 milliGRAM(s) SubCutaneous every 24 hours  gabapentin 300 milliGRAM(s) Oral three times a day  influenza   Vaccine 0.5 milliLiter(s) IntraMuscular once  lacosamide 100 milliGRAM(s) Oral two times a day  levETIRAcetam 250 milliGRAM(s) Oral two times a day  lisdexamfetamine 50 milliGRAM(s) Oral with breakfast  meclizine 50 milliGRAM(s) Oral two times a day  pantoprazole    Tablet 40 milliGRAM(s) Oral before breakfast  sodium chloride 0.9%. 1000 milliLiter(s) (100 mL/Hr) IV Continuous <Continuous>    MEDICATIONS  (PRN):  acetaminophen     Tablet .. 650 milliGRAM(s) Oral every 6 hours PRN Temp greater or equal to 38C (100.4F), Mild Pain (1 - 3)  aluminum hydroxide/magnesium hydroxide/simethicone Suspension 30 milliLiter(s) Oral every 4 hours PRN Dyspepsia  clonazePAM  Tablet 0.5 milliGRAM(s) Oral every 8 hours PRN Dizziness  melatonin 3 milliGRAM(s) Oral at bedtime PRN Insomnia  ondansetron Injectable 4 milliGRAM(s) IV Push every 6 hours PRN Nausea and/or Vomiting      CAPILLARY BLOOD GLUCOSE  84 (11 Oct 2023 12:01)      POCT Blood Glucose.: 84 mg/dL (11 Oct 2023 11:36)    I&O's Summary    11 Oct 2023 07:01  -  12 Oct 2023 07:00  --------------------------------------------------------  IN: 0 mL / OUT: 600 mL / NET: -600 mL        PHYSICAL EXAM:  Vital Signs Last 24 Hrs  T(C): 36.4 (12 Oct 2023 06:05), Max: 36.7 (11 Oct 2023 11:35)  T(F): 97.6 (12 Oct 2023 06:05), Max: 98 (11 Oct 2023 11:35)  HR: 88 (12 Oct 2023 06:05) (58 - 88)  BP: 103/69 (12 Oct 2023 06:05) (103/69 - 137/76)  BP(mean): 87 (11 Oct 2023 21:00) (87 - 87)  RR: 18 (12 Oct 2023 06:05) (16 - 18)  SpO2: 97% (12 Oct 2023 06:05) (97% - 100%)    Parameters below as of 12 Oct 2023 06:05  Patient On (Oxygen Delivery Method): room air        GENERAL: No apparent distress.   HEAD:  Atraumatic, Normocephalic  EYES: EOMI, PERRLA, conjunctiva and sclera clear  NECK: Supple, no lymphadenopathy, no elevated JVP  CHEST/LUNG: Clear to auscultation bilateral and symmetric; No wheezes, rales, or rhonchi  HEART: S1 and S2 normal. Regular rate and rhythm; No murmurs, rubs, or gallops  ABDOMEN: Soft, non-tender, non-distended; normal bowel sounds  EXTREMITIES:  2+ peripheral pulses b/l, No clubbing, cyanosis, or edema  NEUROLOGY: A&O x 3, no focal deficits  SKIN: No rashes or lesions    LABS:                        14.1   5.37  )-----------( 176      ( 11 Oct 2023 11:45 )             41.2     10-11    139  |  104  |  13  ----------------------------<  103<H>  3.9   |  26  |  0.96    Ca    8.9      11 Oct 2023 11:45    TPro  7.1  /  Alb  4.3  /  TBili  0.4  /  DBili  x   /  AST  19  /  ALT  34  /  AlkPhos  80  10-11    PT/INR - ( 11 Oct 2023 11:45 )   PT: 10.1 sec;   INR: <0.90 ratio         PTT - ( 11 Oct 2023 11:45 )  PTT:38.1 sec      Urinalysis Basic - ( 11 Oct 2023 11:45 )    Color: x / Appearance: x / SG: x / pH: x  Gluc: 103 mg/dL / Ketone: x  / Bili: x / Urobili: x   Blood: x / Protein: x / Nitrite: x   Leuk Esterase: x / RBC: x / WBC x   Sq Epi: x / Non Sq Epi: x / Bacteria: x          RADIOLOGY & ADDITIONAL TESTS:  Lab Results Reviewed   Imaging Reviewed  Electrocardiogram Reviewed   PROGRESS NOTE:   Authored by Pedro Guthrie MD  Patient is a 53y old  Male who presents with a chief complaint of Vertigo (11 Oct 2023 18:46)      SUBJECTIVE / OVERNIGHT EVENTS:  No acute events overnight. Patient had no dizziness overnight, but he also did not attempt to life his head. When seen this morning, patient lifted his head and felt dizzy again. Denies any other extremity weakness.     ADDITIONAL REVIEW OF SYSTEMS: Patient denies fevers, chills, headache, nausea or vomiting.     MEDICATIONS  (STANDING):  aspirin enteric coated 81 milliGRAM(s) Oral daily  atorvastatin 20 milliGRAM(s) Oral at bedtime  enoxaparin Injectable 150 milliGRAM(s) SubCutaneous every 24 hours  gabapentin 300 milliGRAM(s) Oral three times a day  influenza   Vaccine 0.5 milliLiter(s) IntraMuscular once  lacosamide 100 milliGRAM(s) Oral two times a day  levETIRAcetam 250 milliGRAM(s) Oral two times a day  lisdexamfetamine 50 milliGRAM(s) Oral with breakfast  meclizine 50 milliGRAM(s) Oral two times a day  pantoprazole    Tablet 40 milliGRAM(s) Oral before breakfast  sodium chloride 0.9%. 1000 milliLiter(s) (100 mL/Hr) IV Continuous <Continuous>    MEDICATIONS  (PRN):  acetaminophen     Tablet .. 650 milliGRAM(s) Oral every 6 hours PRN Temp greater or equal to 38C (100.4F), Mild Pain (1 - 3)  aluminum hydroxide/magnesium hydroxide/simethicone Suspension 30 milliLiter(s) Oral every 4 hours PRN Dyspepsia  clonazePAM  Tablet 0.5 milliGRAM(s) Oral every 8 hours PRN Dizziness  melatonin 3 milliGRAM(s) Oral at bedtime PRN Insomnia  ondansetron Injectable 4 milliGRAM(s) IV Push every 6 hours PRN Nausea and/or Vomiting      CAPILLARY BLOOD GLUCOSE  84 (11 Oct 2023 12:01)      POCT Blood Glucose.: 84 mg/dL (11 Oct 2023 11:36)    I&O's Summary    11 Oct 2023 07:01  -  12 Oct 2023 07:00  --------------------------------------------------------  IN: 0 mL / OUT: 600 mL / NET: -600 mL        PHYSICAL EXAM:  Vital Signs Last 24 Hrs  T(C): 36.4 (12 Oct 2023 06:05), Max: 36.7 (11 Oct 2023 11:35)  T(F): 97.6 (12 Oct 2023 06:05), Max: 98 (11 Oct 2023 11:35)  HR: 88 (12 Oct 2023 06:05) (58 - 88)  BP: 103/69 (12 Oct 2023 06:05) (103/69 - 137/76)  BP(mean): 87 (11 Oct 2023 21:00) (87 - 87)  RR: 18 (12 Oct 2023 06:05) (16 - 18)  SpO2: 97% (12 Oct 2023 06:05) (97% - 100%)    Parameters below as of 12 Oct 2023 06:05  Patient On (Oxygen Delivery Method): room air        GENERAL: No apparent distress.   HEAD:  Atraumatic, Normocephalic  EYES: EOMI, PERRLA, conjunctiva and sclera clear  NECK: Supple, no lymphadenopathy, no elevated JVP  CHEST/LUNG: Clear to auscultation bilateral and symmetric; No wheezes, rales, or rhonchi  HEART: S1 and S2 normal. Regular rate and rhythm; No murmurs, rubs, or gallops  ABDOMEN: Soft, non-tender, non-distended; normal bowel sounds  EXTREMITIES:  2+ peripheral pulses b/l, No clubbing, cyanosis, or edema  NEUROLOGY: A&O x 3, no focal deficits  SKIN: No rashes or lesions    LABS:                        14.1   5.37  )-----------( 176      ( 11 Oct 2023 11:45 )             41.2     10-11    139  |  104  |  13  ----------------------------<  103<H>  3.9   |  26  |  0.96    Ca    8.9      11 Oct 2023 11:45    TPro  7.1  /  Alb  4.3  /  TBili  0.4  /  DBili  x   /  AST  19  /  ALT  34  /  AlkPhos  80  10-11    PT/INR - ( 11 Oct 2023 11:45 )   PT: 10.1 sec;   INR: <0.90 ratio         PTT - ( 11 Oct 2023 11:45 )  PTT:38.1 sec      Urinalysis Basic - ( 11 Oct 2023 11:45 )    Color: x / Appearance: x / SG: x / pH: x  Gluc: 103 mg/dL / Ketone: x  / Bili: x / Urobili: x   Blood: x / Protein: x / Nitrite: x   Leuk Esterase: x / RBC: x / WBC x   Sq Epi: x / Non Sq Epi: x / Bacteria: x          RADIOLOGY & ADDITIONAL TESTS:  Lab Results Reviewed   Imaging Reviewed  Electrocardiogram Reviewed

## 2023-10-12 NOTE — PHYSICAL THERAPY INITIAL EVALUATION ADULT - PERTINENT HX OF CURRENT PROBLEM, REHAB EVAL
Pt is a 53 year old male presenting with dizziness. Pt reports similar dizziness in the past, but the intensity this time was worse than during previous episodes. States the dizziness immediately starts whenever he tries to get up, and resolves when he lays down again. Code stroke called; CT head negative for acute findings. Neurology consulted and performed Nishant-Hallpike revealing + for peripheral vertigo. Pt admitted for BPPV.    *Orthostatics obtained and documented in vital signs.

## 2023-10-12 NOTE — PROGRESS NOTE ADULT - TIME BILLING
-review of the history and records  -general and neurological examination  -generation of assessment and treatment plan  -communication with the patient/family members  -coordination of care.
- Ordering, reviewing, and interpreting labs, testing, and imaging.  - Independently obtaining a review of systems and performing a physical exam  - Reviewing prior hospitalization and where necessary, outpatient records.  - Reviewing consultant recommendations/communicating with consultants  - Counselling and educating patient and family regarding interpretation of aforementioned items and plan of care.  - Communicating care plan with ACP/Resident/RN/SW/CM

## 2023-10-12 NOTE — PROGRESS NOTE ADULT - ASSESSMENT
Patient is a 52 yo M w/ pmhx of CVA, DVT x 3 (b/l, L>R) on chronic Lovenox SQ inj, compartment syndrome status post fasciotomy (c/b L foot drop), hyperlipidemia, PFO, vertigo, seizure (semiology: unilateral "posturing" movements w/ vital sign changes and tongue biting/urinary incontinence) presents to [facility] ED as code stroke for dizziness. Patient has been evaluated by Neurology, and the likely cause of her Dizziness is Peripheral vertigo. Patient is not having any focal neurologic deficits and remains hemodynamically stable.  Patient is a 54 yo M w/ pmhx of CVA, DVT x 3 (b/l, L>R) on chronic Lovenox SQ inj, compartment syndrome status post fasciotomy (c/b L foot drop), hyperlipidemia, PFO, vertigo, seizure (semiology: unilateral "posturing" movements w/ vital sign changes and tongue biting/urinary incontinence) presents to [facility] ED as code stroke for dizziness. Patient has been evaluated by Neurology, and the likely cause of her Dizziness is Peripheral vertigo. Patient is not having any focal neurologic deficits and remains hemodynamically stable.

## 2023-10-12 NOTE — PHYSICAL THERAPY INITIAL EVALUATION ADULT - PATIENT PROFILE REVIEW, REHAB EVAL
PT initial evaluation received and chart review completed. Pt agreeable to participate in PT evaluation. Pt cleared by MARK Norwood./yes

## 2023-10-12 NOTE — PHYSICAL THERAPY INITIAL EVALUATION ADULT - GAIT DEVIATIONS NOTED, PT EVAL
forward flexed posture with downward gaze; excessive left hip flexion through swing; left footdrop/decreased nicanor/decreased velocity of limb motion/decreased step length/decreased stride length/decreased weight-shifting ability

## 2023-10-12 NOTE — PHYSICAL THERAPY INITIAL EVALUATION ADULT - TRANSFER SAFETY CONCERNS NOTED: SIT/STAND, REHAB EVAL
+ dizziness; slightly retropulsive/losing balance/decreased sequencing ability/decreased weight-shifting ability

## 2023-10-12 NOTE — PHYSICAL THERAPY INITIAL EVALUATION ADULT - BED MOBILITY LIMITATIONS, REHAB EVAL
+ dizziness/decreased ability to use legs for bridging/pushing/impaired ability to control trunk for mobility

## 2023-10-12 NOTE — PHYSICAL THERAPY INITIAL EVALUATION ADULT - GENERAL OBSERVATIONS, REHAB EVAL
Upon entry, pt semi-supine in bed in NAD. /87 HR 73 bpm. Pt left seated on EOB with all tubes/lines intact, bed alarm on, call bell in reach and in NAD.

## 2023-10-12 NOTE — PROGRESS NOTE ADULT - ASSESSMENT
54 yo M w/ pmhx of CVA, DVT x 3 (b/l, L>R) on chronic Lovenox SQ inj, compartment syndrome status post fasciotomy (c/b L foot drop), hyperlipidemia, PFO, vertigo, seizure (semiology: unilateral "posturing" movements w/ vital sign changes and tongue biting/urinary incontinence) on Keppra 250 mg BID, Vimpat 100 mg BID, presents to [facility] ED as code stroke for dizziness. Patient's last known well is last night upon going to bed at 22:30. Patient awoke this morning and upon changing position had acute onset room spinning dizziness associated with nausea. The dizziness is positional and resolves when he lies still/does not move and is provoked upon sitting/standing up. Had similar episode on 5/2023 and was diagnosed with peripheral vertigo at [facility]. However, patient reports dizziness is worse than usual. Dizziness not resolving since onset as it reportedly did the last time. HINTS exam suggestive of peripheral etiology; Nishant-Hallpike diagnostic for L posterior canal BPPV. CTH/CTA/CTP negative for acute findings.     NIHSS: 0   mRS: 2     Patient is not a thrombolytic candidate because they are outside of the appropriate window of treatment.  Patient is not a mechanical thrombectomy candidate because no evidence of LVO.    Impression: Positional, intermittent room spinning dizziness likely vertigo from L sided peripheral vestibular dysfunction especially with positive HINTS exam and without acute focality otherwise; likely posterior canal BPPV given [place] Gunn-[place] positive on L side.     Recommendations   [] No further inpatient workup required at this time   [] Symptomatic management w/ IVF, Meclizine 25-50 mg BID  [] Check orthostatics   [] Can consider Clonzepam 0.5mg  q8 PRN if refractory to Meclizine,   [] Zofran 4 mg IVP for nausea, can consider Reglan 10 mg IVP (ekg prior to check qTc)  [] No objection to continuing the rest of patient's home medications at this time  [] outpatient [facility].   [] PT evaluation   [] Upon discharge, patient should follow up with his neurologist, Dr. Forde: [address] [place] [address]. Call [phone].     Patient case discussed and seen with Dr. Forde.     Please call with questions: [phone]      54 yo M w/ pmhx of CVA, DVT x 3 (b/l, L>R) on chronic Lovenox SQ inj, compartment syndrome status post fasciotomy (c/b L foot drop), hyperlipidemia, PFO, vertigo, seizure (semiology: unilateral "posturing" movements w/ vital sign changes and tongue biting/urinary incontinence) on Keppra 250 mg BID, Vimpat 100 mg BID, presents to [facility] ED as code stroke for dizziness. Patient's last known well is last night upon going to bed at 22:30. Patient awoke this morning and upon changing position had acute onset room spinning dizziness associated with nausea. The dizziness is positional and resolves when he lies still/does not move and is provoked upon sitting/standing up. Had similar episode on 5/2023 and was diagnosed with peripheral vertigo at [facility]. However, patient reports dizziness is worse than usual. Dizziness not resolving since onset as it reportedly did the last time. HINTS exam suggestive of peripheral etiology; Nishant-Hallpike diagnostic for L posterior canal BPPV. CTH/CTA/CTP negative for acute findings.     NIHSS: 0   mRS: 2     Patient is not a thrombolytic candidate because they are outside of the appropriate window of treatment.  Patient is not a mechanical thrombectomy candidate because no evidence of LVO.    Impression:   1) Positional, intermittent room spinning dizziness likely vertigo from L sided peripheral vestibular dysfunction especially with positive HINTS exam and without acute focality otherwise; likely posterior canal BPPV given [place] Gunn-[place] positive on L side.   2) prior Strokes, stable, chronic   3) seizure d/o     Recommendations   [] No further inpatient workup required at this time   [] Symptomatic management w/ IVF, Meclizine 25-50 mg BID  [] Check orthostatics   [] Can consider Clonzepam 0.5mg  q8 PRN if refractory to Meclizine,   [] Zofran 4 mg IVP for nausea, can consider Reglan 10 mg IVP (ekg prior to check qTc)  [] No objection to continuing the rest of patient's home medications at this time  [] c/w keppra 250mg BID and vimpat 100mg BID for seizure ppx.  titrating off keppra and increasing vimpat outpatient   [] vyvanse 50 for ADHD   [] for stroke should be on asa 81mg daily and full dose lovenox with atorvastatin 20mg QHS   [] outpatient [facility].   [] PT evaluation   [] Upon discharge, patient should follow up with his neurologist, Dr. Fodre: [address] [place] [address]. Call [phone].     Patient case discussed and seen with Dr. Forde.     Please call with questions: [phone]

## 2023-10-12 NOTE — PROGRESS NOTE ADULT - SUBJECTIVE AND OBJECTIVE BOX
SUBJECTIVE: Feeling better today, but has not stood up out of bed yet today.     INTERVAL HISTORY:  Patient seen at bedside with stroke attending and team.     PAST MEDICAL & SURGICAL HISTORY:  History of TIAs  CVA (cerebrovascular accident)  HLD (hyperlipidemia)  Vertigo  Unresponsiveness  History of fasciotomy    FAMILY HISTORY:  No pertinent family history in first degree relatives    SOCIAL HISTORY:   T/E/D:   Occupation:   Lives with:     MEDICATIONS (HOME):  Home Medications:  aspirin 81 mg oral delayed release tablet: 1 tab(s) orally once a day (11 Oct 2023 17:30)  atorvastatin 20 mg oral tablet: 1 tab(s) orally once a day (11 Oct 2023 17:29)  gabapentin 300 mg oral capsule: 1 orally 3 times a day (11 Oct 2023 17:30)  Keppra 250 mg oral tablet: 1 tab(s) orally 2 times a day (11 Oct 2023 17:29)  Multiple Vitamins oral tablet: 1 tab(s) orally once a day (11 Oct 2023 17:30)  pantoprazole 40 mg oral delayed release tablet: 1 orally once a day (11 Oct 2023 17:30)  Vimpat 100 mg oral tablet: 1 tab(s) orally 2 times a day (11 Oct 2023 17:30)  Vyvanse 50 mg oral capsule: 1 cap(s) orally once a day (11 Oct 2023 17:28)    MEDICATIONS  (STANDING):  aspirin enteric coated 81 milliGRAM(s) Oral daily  atorvastatin 20 milliGRAM(s) Oral at bedtime  enoxaparin Injectable 150 milliGRAM(s) SubCutaneous every 24 hours  gabapentin 300 milliGRAM(s) Oral three times a day  influenza   Vaccine 0.5 milliLiter(s) IntraMuscular once  lacosamide 100 milliGRAM(s) Oral two times a day  levETIRAcetam 250 milliGRAM(s) Oral two times a day  lisdexamfetamine 50 milliGRAM(s) Oral with breakfast  meclizine 50 milliGRAM(s) Oral two times a day  pantoprazole    Tablet 40 milliGRAM(s) Oral before breakfast  sodium chloride 0.9%. 1000 milliLiter(s) (100 mL/Hr) IV Continuous <Continuous>    MEDICATIONS  (PRN):  acetaminophen     Tablet .. 650 milliGRAM(s) Oral every 6 hours PRN Temp greater or equal to 38C (100.4F), Mild Pain (1 - 3)  aluminum hydroxide/magnesium hydroxide/simethicone Suspension 30 milliLiter(s) Oral every 4 hours PRN Dyspepsia  clonazePAM  Tablet 0.5 milliGRAM(s) Oral every 8 hours PRN Dizziness  melatonin 3 milliGRAM(s) Oral at bedtime PRN Insomnia  ondansetron Injectable 4 milliGRAM(s) IV Push every 6 hours PRN Nausea and/or Vomiting    ALLERGIES/INTOLERANCES:  Allergies  No Known Allergies    Intolerances    VITALS & EXAMINATION:  Vital Signs Last 24 Hrs  T(C): 36.4 (12 Oct 2023 06:05), Max: 36.7 (11 Oct 2023 11:35)  T(F): 97.6 (12 Oct 2023 06:05), Max: 98 (11 Oct 2023 11:35)  HR: 88 (12 Oct 2023 06:05) (58 - 88)  BP: 103/69 (12 Oct 2023 06:05) (103/69 - 137/76)  BP(mean): 87 (11 Oct 2023 21:00) (87 - 87)  RR: 18 (12 Oct 2023 06:05) (16 - 18)  SpO2: 97% (12 Oct 2023 06:05) (97% - 100%)    Parameters below as of 12 Oct 2023 06:05  Patient On (Oxygen Delivery Method): room air    Neurological:  MS: Eyes open. Awake, alert, oriented to person, place, situation, time. Follows all commands. Attention/concentration, recent and remote memory, and fund of knowledge intact  Language: Speech is clear, fluent with good repetition & comprehension.  CNs: PERRL (R = 3mm, L = 3mm). VFF. EOMI No nystagmus.  No provocation on dizziness with head turning. V1-3 intact to LT b/l. No facial asymmetry b/l, full eye closure strength b/l. Hearing grossly normal (rubbing fingers) b/l. Tongue midline, normal movements, no atrophy. Palate with symmetric elevation. Trap 5/5 b/l  Motor: Normal muscle bulk & tone. No noticeable tremor. No pronator drift. No drift of UE or LE b/l.               Deltoid	Biceps	Triceps	   R	         5	      5	   5	 5	  		 	  L	         5	      5	   5	 5	  		    	H-Flex	K-Flex	K-Ext	D-Flex	P-Flex  R	    5	  5	   5	  5	    5		   L	    5	   5	   5	  4	    5		   *Some pain limitation on LLE.   Sensation: Intact to LT b/l throughout.   Cortical: Extinction on DSS (neglect): none  Reflexes:              Biceps(C5)       BR(C6)     Triceps(C7)               Patellar(L4)    Achilles(S1)    Plantar Resp  R	              3	          3	             3		 3		    3	Withdrawal       L	              3	          3	             3		 2		    1	Withdrawal    Coordination: No dysmetria to FTN/HTS b/l.   Gait: Deferred.       LABORATORY:  CBC                       13.1   5.22  )-----------( 160      ( 12 Oct 2023 07:15 )             38.5     Chem 10-12    143  |  111<H>  |  10  ----------------------------<  96  3.9   |  23  |  0.92    Ca    8.5      12 Oct 2023 07:15  Phos  3.3     10-12  Mg     2.10     10-12    TPro  6.0  /  Alb  3.5  /  TBili  0.2  /  DBili  x   /  AST  15  /  ALT  25  /  AlkPhos  67  10-12    LFTs LIVER FUNCTIONS - ( 12 Oct 2023 07:15 )  Alb: 3.5 g/dL / Pro: 6.0 g/dL / ALK PHOS: 67 U/L / ALT: 25 U/L / AST: 15 U/L / GGT: x           Coagulopathy PT/INR - ( 11 Oct 2023 11:45 )   PT: 10.1 sec;   INR: <0.90 ratio         PTT - ( 11 Oct 2023 11:45 )  PTT:38.1 sec  Lipid Panel   A1c   Cardiac enzymes     U/A Urinalysis Basic - ( 12 Oct 2023 07:15 )    Color: x / Appearance: x / SG: x / pH: x  Gluc: 96 mg/dL / Ketone: x  / Bili: x / Urobili: x   Blood: x / Protein: x / Nitrite: x   Leuk Esterase: x / RBC: x / WBC x   Sq Epi: x / Non Sq Epi: x / Bacteria: x      Radiology (XR, CT, MR, U/S, TTE/MIMI):      CT brain:  No hydrocephalus, acute intracranial hemorrhage, mass effect, or brain   edema.    CT brain perfusion:  No perfusion abnormality    CTA brain:  No flow-limiting stenosis or vascular aneurysm. No AVM.    Venous system is well opacified, no evidence for venous sinus or cortical   vein thrombosis.    CTA neck:  No flow-limiting stenosis, evidence for arterial dissection, or vascular   aneurysm.

## 2023-10-13 ENCOUNTER — TRANSCRIPTION ENCOUNTER (OUTPATIENT)
Age: 53
End: 2023-10-13

## 2023-10-13 VITALS
TEMPERATURE: 98 F | HEART RATE: 80 BPM | RESPIRATION RATE: 18 BRPM | DIASTOLIC BLOOD PRESSURE: 70 MMHG | OXYGEN SATURATION: 100 % | SYSTOLIC BLOOD PRESSURE: 129 MMHG

## 2023-10-13 LAB
ALBUMIN SERPL ELPH-MCNC: 3.5 G/DL — SIGNIFICANT CHANGE UP (ref 3.3–5)
ALP SERPL-CCNC: 66 U/L — SIGNIFICANT CHANGE UP (ref 40–120)
ALT FLD-CCNC: 26 U/L — SIGNIFICANT CHANGE UP (ref 4–41)
ANION GAP SERPL CALC-SCNC: 12 MMOL/L — SIGNIFICANT CHANGE UP (ref 7–14)
AST SERPL-CCNC: 15 U/L — SIGNIFICANT CHANGE UP (ref 4–40)
BILIRUB SERPL-MCNC: 0.3 MG/DL — SIGNIFICANT CHANGE UP (ref 0.2–1.2)
BUN SERPL-MCNC: 13 MG/DL — SIGNIFICANT CHANGE UP (ref 7–23)
CALCIUM SERPL-MCNC: 8.1 MG/DL — LOW (ref 8.4–10.5)
CHLORIDE SERPL-SCNC: 107 MMOL/L — SIGNIFICANT CHANGE UP (ref 98–107)
CO2 SERPL-SCNC: 23 MMOL/L — SIGNIFICANT CHANGE UP (ref 22–31)
CREAT SERPL-MCNC: 0.94 MG/DL — SIGNIFICANT CHANGE UP (ref 0.5–1.3)
EGFR: 97 ML/MIN/1.73M2 — SIGNIFICANT CHANGE UP
GLUCOSE SERPL-MCNC: 95 MG/DL — SIGNIFICANT CHANGE UP (ref 70–99)
HCT VFR BLD CALC: 37.8 % — LOW (ref 39–50)
HGB BLD-MCNC: 12.8 G/DL — LOW (ref 13–17)
MAGNESIUM SERPL-MCNC: 2 MG/DL — SIGNIFICANT CHANGE UP (ref 1.6–2.6)
MCHC RBC-ENTMCNC: 29.4 PG — SIGNIFICANT CHANGE UP (ref 27–34)
MCHC RBC-ENTMCNC: 33.9 GM/DL — SIGNIFICANT CHANGE UP (ref 32–36)
MCV RBC AUTO: 86.7 FL — SIGNIFICANT CHANGE UP (ref 80–100)
NRBC # BLD: 0 /100 WBCS — SIGNIFICANT CHANGE UP (ref 0–0)
NRBC # FLD: 0 K/UL — SIGNIFICANT CHANGE UP (ref 0–0)
PHOSPHATE SERPL-MCNC: 3.4 MG/DL — SIGNIFICANT CHANGE UP (ref 2.5–4.5)
PLATELET # BLD AUTO: 158 K/UL — SIGNIFICANT CHANGE UP (ref 150–400)
POTASSIUM SERPL-MCNC: 3.7 MMOL/L — SIGNIFICANT CHANGE UP (ref 3.5–5.3)
POTASSIUM SERPL-SCNC: 3.7 MMOL/L — SIGNIFICANT CHANGE UP (ref 3.5–5.3)
PROT SERPL-MCNC: 5.9 G/DL — LOW (ref 6–8.3)
RBC # BLD: 4.36 M/UL — SIGNIFICANT CHANGE UP (ref 4.2–5.8)
RBC # FLD: 12.9 % — SIGNIFICANT CHANGE UP (ref 10.3–14.5)
SODIUM SERPL-SCNC: 142 MMOL/L — SIGNIFICANT CHANGE UP (ref 135–145)
WBC # BLD: 5.93 K/UL — SIGNIFICANT CHANGE UP (ref 3.8–10.5)
WBC # FLD AUTO: 5.93 K/UL — SIGNIFICANT CHANGE UP (ref 3.8–10.5)

## 2023-10-13 PROCEDURE — 99239 HOSP IP/OBS DSCHRG MGMT >30: CPT | Mod: GC

## 2023-10-13 RX ORDER — CLONAZEPAM 1 MG
1 TABLET ORAL
Qty: 14 | Refills: 0
Start: 2023-10-13 | End: 2023-10-19

## 2023-10-13 RX ORDER — MECLIZINE HCL 12.5 MG
1 TABLET ORAL
Qty: 28 | Refills: 0
Start: 2023-10-13 | End: 2023-10-26

## 2023-10-13 RX ADMIN — LACOSAMIDE 100 MILLIGRAM(S): 50 TABLET ORAL at 06:05

## 2023-10-13 RX ADMIN — Medication 81 MILLIGRAM(S): at 13:10

## 2023-10-13 RX ADMIN — Medication 75 MILLIGRAM(S): at 06:05

## 2023-10-13 RX ADMIN — Medication 50 MILLIGRAM(S): at 06:04

## 2023-10-13 RX ADMIN — Medication 0.5 MILLIGRAM(S): at 09:28

## 2023-10-13 RX ADMIN — PANTOPRAZOLE SODIUM 40 MILLIGRAM(S): 20 TABLET, DELAYED RELEASE ORAL at 06:05

## 2023-10-13 RX ADMIN — LEVETIRACETAM 250 MILLIGRAM(S): 250 TABLET, FILM COATED ORAL at 06:04

## 2023-10-13 RX ADMIN — LISDEXAMFETAMINE DIMESYLATE 50 MILLIGRAM(S): 70 CAPSULE ORAL at 09:25

## 2023-10-13 NOTE — DISCHARGE NOTE NURSING/CASE MANAGEMENT/SOCIAL WORK - PATIENT PORTAL LINK FT
You can access the FollowMyHealth Patient Portal offered by [facility] by registering at the following website: [email or web address]. By joining [employer]’s FollowMyHealth portal, you will also be able to view your health information using other applications (apps) compatible with our system.

## 2023-10-13 NOTE — PROGRESS NOTE ADULT - ASSESSMENT
52 yo M w/ pmhx of CVA, DVT x 3 (b/l, L>R) on chronic Lovenox SQ inj, compartment syndrome status post fasciotomy (c/b L foot drop), hyperlipidemia, PFO, vertigo, seizure (semiology: unilateral "posturing" movements w/ vital sign changes and tongue biting/urinary incontinence) on Keppra 250 mg BID, Vimpat 100 mg BID, presents to [facility] ED as code stroke for dizziness. Patient's last known well is last night upon going to bed at 22:30. Patient awoke this morning and upon changing position had acute onset room spinning dizziness associated with nausea. The dizziness is positional and resolves when he lies still/does not move and is provoked upon sitting/standing up. Had similar episode on 5/2023 and was diagnosed with peripheral vertigo at [facility]. However, patient reports dizziness is worse than usual. Dizziness not resolving since onset as it reportedly did the last time. HINTS exam suggestive of peripheral etiology; Nishant-Hallpike diagnostic for L posterior canal BPPV. CTH/CTA/CTP negative for acute findings.     NIHSS: 0   mRS: 2     Patient is not a thrombolytic candidate because they are outside of the appropriate window of treatment.  Patient is not a mechanical thrombectomy candidate because no evidence of LVO.    Impression:   1) Positional, intermittent room spinning dizziness likely vertigo from L sided peripheral vestibular dysfunction especially with positive HINTS exam and without acute focality otherwise; likely posterior canal BPPV given [place] Gunn-[place] positive on L side.   2) prior Strokes, stable, chronic   3) seizure d/o     Recommendations   [] No further inpatient workup required at this time   [] Symptomatic management w/ IVF, Meclizine 25-50 mg BID  [] Check orthostatics   [] Can consider Clonzepam 0.5mg  q8 PRN if refractory to Meclizine -> consider trying today given worsening sx   [] Zofran 4 mg IVP for nausea, can consider Reglan 10 mg IVP (ekg prior to check qTc)  [] No objection to continuing the rest of patient's home medications at this time  [] c/w keppra 250mg BID and vimpat 100mg BID for seizure ppx.  titrating off keppra and increasing vimpat outpatient   [] vyvanse 50 for ADHD   [] for stroke should be on asa 81mg daily and full dose lovenox with atorvastatin 20mg QHS   [] outpatient [facility].   [] PT evaluation   [] Upon discharge, patient should follow up with Dr. Garcia or Dr. Forde at [phone]

## 2023-10-13 NOTE — PROGRESS NOTE ADULT - SUBJECTIVE AND OBJECTIVE BOX
PROGRESS NOTE:   Authored by Pedro Guthrie MD  Patient is a 53y old  Male who presents with a chief complaint of Vertigo (12 Oct 2023 09:12)      SUBJECTIVE / OVERNIGHT EVENTS:  No acute events overnight. Patient was able to ambulate with PT yesterday for a few minutes. He states that he still has dizziness when he arises, but since starting the Klonopin, the dizziness has greatly improved. He will like to try to ambulate again today to see how he is doing.     ADDITIONAL REVIEW OF SYSTEMS: Patient denies fevers, chills, chest pain, nausea or vomiting     MEDICATIONS  (STANDING):  aspirin enteric coated 81 milliGRAM(s) Oral daily  atorvastatin 20 milliGRAM(s) Oral at bedtime  clonazePAM  Tablet 0.5 milliGRAM(s) Oral every 12 hours  enoxaparin Injectable 150 milliGRAM(s) SubCutaneous every 24 hours  influenza   Vaccine 0.5 milliLiter(s) IntraMuscular once  lacosamide 100 milliGRAM(s) Oral two times a day  levETIRAcetam 250 milliGRAM(s) Oral two times a day  lisdexamfetamine 50 milliGRAM(s) Oral with breakfast  meclizine 50 milliGRAM(s) Oral two times a day  pantoprazole    Tablet 40 milliGRAM(s) Oral before breakfast  pregabalin 75 milliGRAM(s) Oral two times a day  sodium chloride 0.9%. 1000 milliLiter(s) (100 mL/Hr) IV Continuous <Continuous>    MEDICATIONS  (PRN):  acetaminophen     Tablet .. 650 milliGRAM(s) Oral every 6 hours PRN Temp greater or equal to 38C (100.4F), Mild Pain (1 - 3)  aluminum hydroxide/magnesium hydroxide/simethicone Suspension 30 milliLiter(s) Oral every 4 hours PRN Dyspepsia  melatonin 3 milliGRAM(s) Oral at bedtime PRN Insomnia  ondansetron Injectable 4 milliGRAM(s) IV Push every 6 hours PRN Nausea and/or Vomiting      CAPILLARY BLOOD GLUCOSE        I&O's Summary    12 Oct 2023 07:01  -  13 Oct 2023 07:00  --------------------------------------------------------  IN: 1500 mL / OUT: 600 mL / NET: 900 mL        PHYSICAL EXAM:  Vital Signs Last 24 Hrs  T(C): 36.6 (13 Oct 2023 05:53), Max: 36.9 (12 Oct 2023 14:05)  T(F): 97.8 (13 Oct 2023 05:53), Max: 98.5 (12 Oct 2023 14:05)  HR: 88 (13 Oct 2023 05:53) (60 - 88)  BP: 119/61 (13 Oct 2023 05:53) (108/69 - 124/84)  BP(mean): --  RR: 18 (13 Oct 2023 05:53) (18 - 20)  SpO2: 98% (13 Oct 2023 05:53) (97% - 100%)    Parameters below as of 13 Oct 2023 05:53  Patient On (Oxygen Delivery Method): room air        GENERAL: No apparent distress.   HEAD:  Atraumatic, Normocephalic  EYES: EOMI, PERRLA, conjunctiva and sclera clear  NECK: Supple, no lymphadenopathy, no elevated JVP  CHEST/LUNG: Clear to auscultation bilateral and symmetric; No wheezes, rales, or rhonchi  HEART: S1 and S2 normal. Regular rate and rhythm; No murmurs, rubs, or gallops  ABDOMEN: Soft, non-tender, non-distended; normal bowel sounds  EXTREMITIES:  2+ peripheral pulses b/l, No clubbing, cyanosis, or edema  NEUROLOGY: A&O x 3, no focal deficits  SKIN: No rashes or lesions    LABS:                        12.8   5.93  )-----------( 158      ( 13 Oct 2023 07:59 )             37.8     10-13    142  |  107  |  13  ----------------------------<  95  3.7   |  23  |  0.94    Ca    8.1<L>      13 Oct 2023 07:59  Phos  3.4     10-13  Mg     2.00     10-13    TPro  5.9<L>  /  Alb  3.5  /  TBili  0.3  /  DBili  x   /  AST  15  /  ALT  26  /  AlkPhos  66  10-13    PT/INR - ( 11 Oct 2023 11:45 )   PT: 10.1 sec;   INR: <0.90 ratio         PTT - ( 11 Oct 2023 11:45 )  PTT:38.1 sec      Urinalysis Basic - ( 13 Oct 2023 07:59 )    Color: x / Appearance: x / SG: x / pH: x  Gluc: 95 mg/dL / Ketone: x  / Bili: x / Urobili: x   Blood: x / Protein: x / Nitrite: x   Leuk Esterase: x / RBC: x / WBC x   Sq Epi: x / Non Sq Epi: x / Bacteria: x          RADIOLOGY & ADDITIONAL TESTS:  Lab Results Reviewed   Imaging Reviewed  Electrocardiogram Reviewed

## 2023-10-13 NOTE — PROGRESS NOTE ADULT - ATTENDING COMMENTS
53F with vertigo. symptoms improved with Klonopin. PT rec for rehab due to stability. patient wishes to return home instead. He is medically stable for discharge home. continue Klonopin as needed. outpatient [facility]. f/u with neurology in office.
53M with h/o seizure, multiple DVT presented with acute onset vertigo, likely secondary to BPPV. He reprots feeling better this morning. patient was able to sit up, but still have severe dizziness with spinning sensation with head movement. trial Klonopin. Patient reports having multiple steps in the house. unsteady with standing. not a safe discharge home in this condition. will have PT evaluate patient. rest of care plan as above.

## 2023-10-13 NOTE — PROGRESS NOTE ADULT - ASSESSMENT
Patient is a 54 yo M w/ pmhx of CVA, DVT x 3 (b/l, L>R) on chronic Lovenox SQ inj, compartment syndrome status post fasciotomy (c/b L foot drop), hyperlipidemia, PFO, vertigo, seizure (semiology: unilateral "posturing" movements w/ vital sign changes and tongue biting/urinary incontinence) presents to [facility] ED as code stroke for dizziness. Patient has been evaluated by Neurology, and the likely cause of her Dizziness is Peripheral vertigo. Patient is not having any focal neurologic deficits and remains hemodynamically stable.

## 2023-10-13 NOTE — PROGRESS NOTE ADULT - PROBLEM SELECTOR PROBLEM 4
Encounter for follow-up of chronic deep vein thrombosis (DVT) of both lower extremities
Encounter for follow-up of chronic deep vein thrombosis (DVT) of both lower extremities

## 2023-10-13 NOTE — PROGRESS NOTE ADULT - PROBLEM SELECTOR PLAN 3
-No new focal deficits during exam today   -Will continue Aspirin 81mg daily
-No new focal deficits during exam today   -Will continue Aspirin 81mg daily

## 2023-10-13 NOTE — PROGRESS NOTE ADULT - PROBLEM SELECTOR PLAN 4
-No signs of an acute DVT at this time  -Will continue Home Lovenox 150mg Daily
-No signs of an acute DVT at this time  -Will continue Home Lovenox 150mg Daily

## 2023-10-13 NOTE — PROGRESS NOTE ADULT - PROBLEM SELECTOR PLAN 2
-Most recent Seizure in December 2022  -Will continue home medications      Vimpat 100mg BID      Keppra 250mg BID
-Most recent Seizure in December 2022  -Will continue home medications      Vimpat 100mg BID      Keppra 250mg BID

## 2023-10-13 NOTE — PROGRESS NOTE ADULT - PROBLEM SELECTOR PLAN 1
improving   -Patient presenting with Dizziness since the AM. No other neurologic deficits.   -Etiology- Likely BPPV, imaging unremarkable.   -Patient received Meclizine 25mg x2  Plan  -Continue Meclizine 50mg BID   -Continue Klonopin 0.5mg BID   -Continue Zofran PRN for Nausea  -Will defer any further imaging at this time.

## 2023-10-13 NOTE — DISCHARGE NOTE NURSING/CASE MANAGEMENT/SOCIAL WORK - [email or web address]_GEN_ALL_CORE
For information on Fall & Injury Prevention, visit: [email or web address] OR  [email or web address] OR  https://www.cdc.gov/steadi/patient.html

## 2023-10-13 NOTE — PROGRESS NOTE ADULT - SUBJECTIVE AND OBJECTIVE BOX
SUBJECTIVE: Slightly more dizzy today, felt better yesterday     PAST MEDICAL & SURGICAL HISTORY:  History of TIAs  CVA (cerebrovascular accident)  HLD (hyperlipidemia)  Vertigo  Unresponsiveness  History of fasciotomy    FAMILY HISTORY:  No pertinent family history in first degree relatives    SOCIAL HISTORY:   T/E/D:   Occupation:   Lives with:       Medications: MEDICATIONS  (STANDING):  aspirin enteric coated 81 milliGRAM(s) Oral daily  atorvastatin 20 milliGRAM(s) Oral at bedtime  clonazePAM  Tablet 0.5 milliGRAM(s) Oral every 12 hours  enoxaparin Injectable 150 milliGRAM(s) SubCutaneous every 24 hours  influenza   Vaccine 0.5 milliLiter(s) IntraMuscular once  lacosamide 100 milliGRAM(s) Oral two times a day  levETIRAcetam 250 milliGRAM(s) Oral two times a day  lisdexamfetamine 50 milliGRAM(s) Oral with breakfast  meclizine 50 milliGRAM(s) Oral two times a day  pantoprazole    Tablet 40 milliGRAM(s) Oral before breakfast  pregabalin 75 milliGRAM(s) Oral two times a day  sodium chloride 0.9%. 1000 milliLiter(s) (100 mL/Hr) IV Continuous <Continuous>    MEDICATIONS  (PRN):  acetaminophen     Tablet .. 650 milliGRAM(s) Oral every 6 hours PRN Temp greater or equal to 38C (100.4F), Mild Pain (1 - 3)  aluminum hydroxide/magnesium hydroxide/simethicone Suspension 30 milliLiter(s) Oral every 4 hours PRN Dyspepsia  melatonin 3 milliGRAM(s) Oral at bedtime PRN Insomnia  ondansetron Injectable 4 milliGRAM(s) IV Push every 6 hours PRN Nausea and/or Vomiting       Vitals:  Vital Signs Last 24 Hrs  T(C): 36.9 (13 Oct 2023 09:25), Max: 36.9 (12 Oct 2023 14:05)  T(F): 98.5 (13 Oct 2023 09:25), Max: 98.5 (12 Oct 2023 14:05)  HR: 68 (13 Oct 2023 09:25) (63 - 88)  BP: 120/63 (13 Oct 2023 09:25) (108/69 - 124/84)  BP(mean): --  RR: 18 (13 Oct 2023 09:25) (18 - 20)  SpO2: 100% (13 Oct 2023 09:25) (97% - 100%)    Parameters below as of 13 Oct 2023 09:25  Patient On (Oxygen Delivery Method): room air          Neurological:  MS: Eyes open. Awake, alert, oriented to person, place, situation, time. Follows all commands. Attention/concentration, recent and remote memory, and fund of knowledge intact  Language: Speech is clear, fluent with good repetition & comprehension.  CNs: PERRL (R = 3mm, L = 3mm). VFF. EOMI No nystagmus.  No provocation on dizziness with head turning. V1-3 intact to LT b/l. No facial asymmetry b/l, full eye closure strength b/l. Hearing grossly normal (rubbing fingers) b/l. Tongue midline, normal movements, no atrophy. Palate with symmetric elevation. Trap 5/5 b/l  Motor: Normal muscle bulk & tone. No noticeable tremor. No pronator drift. No drift of UE or LE b/l.               Deltoid	Biceps	Triceps	   R	         5	      5	   5	 5	  		 	  L	         5	      5	   5	 5	  		    	H-Flex	K-Flex	K-Ext	D-Flex	P-Flex  R	    5	  5	   5	  5	    5		   L	    5	   5	   5	  4	    5		   *Some pain limitation on LLE.   Sensation: Intact to LT b/l throughout.   Cortical: Extinction on DSS (neglect): none  Reflexes:              Biceps(C5)       BR(C6)     Triceps(C7)               Patellar(L4)    Achilles(S1)    Plantar Resp  R	              3	          3	             3		 3		    3	Withdrawal       L	              3	          3	             3		 2		    1	Withdrawal    Coordination: No dysmetria to FTN/HTS b/l.   Gait: Deferred.       LABS:                          12.8   5.93  )-----------( 158      ( 13 Oct 2023 07:59 )             37.8     10-13    142  |  107  |  13  ----------------------------<  95  3.7   |  23  |  0.94    Ca    8.1<L>      13 Oct 2023 07:59  Phos  3.4     10-13  Mg     2.00     10-13    TPro  5.9<L>  /  Alb  3.5  /  TBili  0.3  /  DBili  x   /  AST  15  /  ALT  26  /  AlkPhos  66  10-13    LIVER FUNCTIONS - ( 13 Oct 2023 07:59 )  Alb: 3.5 g/dL / Pro: 5.9 g/dL / ALK PHOS: 66 U/L / ALT: 26 U/L / AST: 15 U/L / GGT: x           PT/INR - ( 11 Oct 2023 11:45 )   PT: 10.1 sec;   INR: <0.90 ratio         PTT - ( 11 Oct 2023 11:45 )  PTT:38.1 sec  Urinalysis Basic - ( 13 Oct 2023 07:59 )    Color: x / Appearance: x / SG: x / pH: x  Gluc: 95 mg/dL / Ketone: x  / Bili: x / Urobili: x   Blood: x / Protein: x / Nitrite: x   Leuk Esterase: x / RBC: x / WBC x   Sq Epi: x / Non Sq Epi: x / Bacteria: x          Radiology (XR, CT, MR, U/S, TTE/MIMI):      CT brain:  No hydrocephalus, acute intracranial hemorrhage, mass effect, or brain   edema.    CT brain perfusion:  No perfusion abnormality    CTA brain:  No flow-limiting stenosis or vascular aneurysm. No AVM.    Venous system is well opacified, no evidence for venous sinus or cortical   vein thrombosis.    CTA neck:  No flow-limiting stenosis, evidence for arterial dissection, or vascular   aneurysm.

## 2023-10-13 NOTE — PROGRESS NOTE ADULT - PROBLEM SELECTOR PLAN 6
Diet: Regular   DVT ppx: On Lovenox 150mg   Dispo:Home  Code: Full
Diet: Regular   DVT ppx: On Lovenox 150mg   Dispo: Home  Code: Full

## 2023-10-16 ENCOUNTER — APPOINTMENT (OUTPATIENT)
Dept: ORTHOPEDIC SURGERY | Facility: CLINIC | Age: 53
End: 2023-10-16

## 2023-10-23 ENCOUNTER — APPOINTMENT (OUTPATIENT)
Dept: ORTHOPEDIC SURGERY | Facility: CLINIC | Age: 53
End: 2023-10-23

## 2023-10-26 ENCOUNTER — APPOINTMENT (OUTPATIENT)
Dept: RADIOLOGY | Facility: CLINIC | Age: 53
End: 2023-10-26

## 2023-11-07 ENCOUNTER — NON-APPOINTMENT (OUTPATIENT)
Age: 53
End: 2023-11-07

## 2023-11-07 ENCOUNTER — APPOINTMENT (OUTPATIENT)
Dept: GASTROENTEROLOGY | Facility: CLINIC | Age: 53
End: 2023-11-07
Payer: MEDICAID

## 2023-11-07 VITALS
DIASTOLIC BLOOD PRESSURE: 68 MMHG | BODY MASS INDEX: 26.61 KG/M2 | HEIGHT: 75 IN | SYSTOLIC BLOOD PRESSURE: 119 MMHG | OXYGEN SATURATION: 98 % | HEART RATE: 87 BPM | WEIGHT: 214 LBS

## 2023-11-07 DIAGNOSIS — K63.5 POLYP OF COLON: ICD-10-CM

## 2023-11-07 PROCEDURE — 99203 OFFICE O/P NEW LOW 30 MIN: CPT

## 2023-11-08 PROBLEM — K63.5 POLYP OF SIGMOID COLON, UNSPECIFIED TYPE: Status: ACTIVE | Noted: 2023-11-08

## 2023-11-24 ENCOUNTER — APPOINTMENT (OUTPATIENT)
Dept: RADIOLOGY | Facility: CLINIC | Age: 53
End: 2023-11-24
Payer: MEDICAID

## 2023-11-24 PROCEDURE — 77080 DXA BONE DENSITY AXIAL: CPT

## 2023-12-23 NOTE — PHYSICAL THERAPY INITIAL EVALUATION ADULT - REHAB POTENTIAL, PT EVAL
Pt to ED for high blood sugar. Pt is in police custody and states they did not have his insulin there to give him for coverage so they had to come here. Pt denies symptoms.
good, to achieve stated therapy goals

## 2023-12-27 DIAGNOSIS — K63.5 POLYP OF COLON: ICD-10-CM

## 2024-01-09 ENCOUNTER — INPATIENT (INPATIENT)
Facility: HOSPITAL | Age: 54
LOS: 12 days | Discharge: HOME | DRG: 690 | End: 2024-01-22
Attending: INTERNAL MEDICINE | Admitting: INTERNAL MEDICINE
Payer: MEDICAID

## 2024-01-09 VITALS
RESPIRATION RATE: 16 BRPM | SYSTOLIC BLOOD PRESSURE: 146 MMHG | OXYGEN SATURATION: 100 % | HEART RATE: 66 BPM | HEIGHT: 75 IN | WEIGHT: 210.1 LBS | TEMPERATURE: 98 F | DIASTOLIC BLOOD PRESSURE: 98 MMHG

## 2024-01-09 DIAGNOSIS — Z98.890 OTHER SPECIFIED POSTPROCEDURAL STATES: Chronic | ICD-10-CM

## 2024-01-09 PROCEDURE — 99285 EMERGENCY DEPT VISIT HI MDM: CPT

## 2024-01-09 NOTE — ED PROVIDER NOTE - OBJECTIVE STATEMENT
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    Pt reports 3-4 day hx of hematuria, was seen by outpatient Urology earlier on 1/9, was prescribed cipro for suspected UTI. Reports taking 1 dose of prescribed cipro. Hematuria was not accompanied by any pain until 4-5 pm on 1/9 when pt started developing severe, sharp suprapubic pain that radiated bilaterally towards his flanks. Appears unassociated with urination. Reports +BM. Denies fevers, chills, nausea, vomiting, dizziness, chest pain, SOB, abdominal pain, dysuria. 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    Pt reports 3-4 day hx of hematuria, was seen by outpatient Urology earlier on 1/9, was prescribed cipro for suspected UTI. Reports taking 1 dose of prescribed cipro. Hematuria was not accompanied by any pain until 4-5 pm on 1/9 when pt started developing severe, sharp suprapubic pain that radiated bilaterally towards his flanks. Appears unassociated with urination. Reports +BM. Denies fevers, chills, nausea, vomiting, dizziness, chest pain, SOB, abdominal pain, dysuria.

## 2024-01-09 NOTE — ED PROVIDER NOTE - PROGRESS NOTE DETAILS
kassandra attending- patient re-evaluated and doing well.  No acute issues at  this time. Updated patient with results thus far and pending urology evaluation [place]: plan for admission to Hospital Medicine with Urology following. Ordered BLE Duplex to r/o DVT (LE edema). Giving IV CTX for empiric UTI coverage.

## 2024-01-09 NOTE — ED PROVIDER NOTE - PHYSICAL EXAMINATION
PHYSICAL EXAM:    GENERAL: NAD  HEENT:  Atraumatic  CHEST/LUNG: Chest rise equal bilaterally  HEART: Regular rate and rhythm  ABDOMEN: Soft, Nontender, Nondistended  EXTREMITIES:  Extremities warm  PSYCH: A&Ox3  SKIN: B/l upper thigh with ecchymosis (daily SQ Lovenox)  MSK: No cervical spine TTP, able to range neck to the left and right/ No midline spinal TTP/ No CVA tenderness  : No suprapubic tenderness  NEUROLOGY: strength and sensation intact in all extremities. CN 2 - 12 intact.

## 2024-01-09 NOTE — ED PROVIDER NOTE - RAPID ASSESSMENT
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  Saw his urologist today was started on an antibiotic (pt unsure which). Patient also reports several episodes of severe intermittent left flank pain today, denies history of kidney stones to his knowledge.  Denies associated nausea, vomiting, diarrhea, dysuria, fever, chills.    well appearing NAD    I, Tate Spring PA-C saw patient as a rapid assessment initially via telemedicine encounter. The rest of care to be performed by the primary ED team. Receiving team will follow up on labs, analgesia, any clinical imaging, and perform reassessment and disposition of the patient as clinically indicated. All decisions regarding the progression of care will be made at their discretion.

## 2024-01-09 NOTE — ED PROVIDER NOTE - ATTENDING CONTRIBUTION TO CARE
I, Julio Trevizo, performed a history and physical exam of the patient and discussed their management with the resident and/or advanced care provider. I reviewed the resident and/or advanced care provider's note and agree with the documented findings and plan of care. I was present and available for all procedures.    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    Pt reports 3-4 day hx of hematuria, was seen by outpatient Urology earlier on 1/9, was prescribed cipro for suspected UTI. Reports taking 1 dose of prescribed cipro. Hematuria was not accompanied by any pain until 4-5 pm on 1/9 when pt started developing severe, sharp suprapubic pain that radiated bilaterally towards his flanks. Appears unassociated with urination. Reports +BM. Denies fevers, chills, nausea, vomiting, dizziness, chest pain, SOB, abdominal pain, dysuria.    Well appearing and in NAD, head normal appearing atraumatic, trachea midline, no respiratory distress, lungs cta bilaterally, rrr no murmurs, soft NT ND abdomen, no cva ttp, no visible extremity deformities, Alert and oriented, non focal neuro exam, skin warm and dry, normal affect and mood, no leg swelling, calf ttp or jvd    patient with l renal colic eval with ua labs ctap eval poss uro consult. Unlikely ACS PE pneumothorax dissection AAA pneumonia disposition pending workup and reevaluation discussed with patient wife at bedside agreed with plan I, Julio Trevizo, performed a history and physical exam of the patient and discussed their management with the resident and/or advanced care provider. I reviewed the resident and/or advanced care provider's note and agree with the documented findings and plan of care. I was present and available for all procedures.    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    Pt reports 3-4 day hx of hematuria, was seen by outpatient Urology earlier on 1/9, was prescribed cipro for suspected UTI. Reports taking 1 dose of prescribed cipro. Hematuria was not accompanied by any pain until 4-5 pm on 1/9 when pt started developing severe, sharp suprapubic pain that radiated bilaterally towards his flanks. Appears unassociated with urination. Reports +BM. Denies fevers, chills, nausea, vomiting, dizziness, chest pain, SOB, abdominal pain, dysuria.    Well appearing and in NAD, head normal appearing atraumatic, trachea midline, no respiratory distress, lungs cta bilaterally, rrr no murmurs, soft NT ND abdomen, no cva ttp, no visible extremity deformities, Alert and oriented, non focal neuro exam, skin warm and dry, normal affect and mood, no leg swelling, calf ttp or jvd    patient with l renal colic eval with ua labs ctap eval poss uro consult. Unlikely ACS PE pneumothorax dissection AAA pneumonia disposition pending workup and reevaluation discussed with patient wife at bedside agreed with plan

## 2024-01-09 NOTE — ED PROVIDER NOTE - CLINICAL SUMMARY MEDICAL DECISION MAKING FREE TEXT BOX
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 VSS in ED, afebrile. Physical exam unremarkable, no CVA or suprapubic tenderness. Labs notable for leukocytosis 13.60 with PMN predominance, CT findings with mild left hydronephrosis with abrupt tapering at the ureteropelvic junction and hemorrhage into the dilated left upper renal pole collecting system and associated inflammatory changes throughout the perinephric fat without identified renal calculi. Consulting Urology for further eval. 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 VSS in ED, afebrile. Physical exam unremarkable, no CVA or suprapubic tenderness. Labs notable for leukocytosis 13.60 with PMN predominance, CT findings with mild left hydronephrosis with abrupt tapering at the ureteropelvic junction and hemorrhage into the dilated left upper renal pole collecting system and associated inflammatory changes throughout the perinephric fat without identified renal calculi. Consulting Urology for further eval. 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 ED on 1/9/2024 for acute hematuria and intermittent suprapubic and b/l flank pain. VSS in ED, afebrile. Physical exam unremarkable, no CVA or suprapubic tenderness. Labs notable for leukocytosis 13.60 with PMN predominance, CT findings with mild left hydronephrosis with abrupt tapering at the ureteropelvic junction and hemorrhage into the dilated left upper renal pole collecting system and associated inflammatory changes throughout the perinephric fat without identified renal calculi. Consulting Urology for further eval.    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 VSS in ED, afebrile. Physical exam unremarkable, no CVA or suprapubic tenderness. Labs notable for leukocytosis 13.60 with PMN predominance, CT findings with mild left hydronephrosis with abrupt tapering at the ureteropelvic junction and hemorrhage into the dilated left upper renal pole collecting system and associated inflammatory changes throughout the perinephric fat without identified renal calculi. Consulting Urology for further eval.    kassandra attending- see attending attestation for my mdm

## 2024-01-10 DIAGNOSIS — R09.89 OTHER SPECIFIED SYMPTOMS AND SIGNS INVOLVING THE CIRCULATORY AND RESPIRATORY SYSTEMS: ICD-10-CM

## 2024-01-10 DIAGNOSIS — R31.9 HEMATURIA, UNSPECIFIED: ICD-10-CM

## 2024-01-10 LAB
ALBUMIN SERPL ELPH-MCNC: 4.7 G/DL — SIGNIFICANT CHANGE UP (ref 3.3–5)
ALBUMIN SERPL ELPH-MCNC: 4.7 G/DL — SIGNIFICANT CHANGE UP (ref 3.3–5)
ALP SERPL-CCNC: 78 U/L — SIGNIFICANT CHANGE UP (ref 40–120)
ALP SERPL-CCNC: 78 U/L — SIGNIFICANT CHANGE UP (ref 40–120)
ALT FLD-CCNC: 27 U/L — SIGNIFICANT CHANGE UP (ref 10–45)
ALT FLD-CCNC: 27 U/L — SIGNIFICANT CHANGE UP (ref 10–45)
ANION GAP SERPL CALC-SCNC: 13 MMOL/L — SIGNIFICANT CHANGE UP (ref 5–17)
ANION GAP SERPL CALC-SCNC: 13 MMOL/L — SIGNIFICANT CHANGE UP (ref 5–17)
ANION GAP SERPL CALC-SCNC: 15 MMOL/L — SIGNIFICANT CHANGE UP (ref 5–17)
ANION GAP SERPL CALC-SCNC: 15 MMOL/L — SIGNIFICANT CHANGE UP (ref 5–17)
APPEARANCE UR: CLEAR — SIGNIFICANT CHANGE UP
APPEARANCE UR: CLEAR — SIGNIFICANT CHANGE UP
APTT BLD: 37 SEC — HIGH (ref 24.5–35.6)
APTT BLD: 37 SEC — HIGH (ref 24.5–35.6)
AST SERPL-CCNC: 23 U/L — SIGNIFICANT CHANGE UP (ref 10–40)
AST SERPL-CCNC: 23 U/L — SIGNIFICANT CHANGE UP (ref 10–40)
BACTERIA # UR AUTO: NEGATIVE /HPF — SIGNIFICANT CHANGE UP
BACTERIA # UR AUTO: NEGATIVE /HPF — SIGNIFICANT CHANGE UP
BASOPHILS # BLD AUTO: 0 K/UL — SIGNIFICANT CHANGE UP (ref 0–0.2)
BASOPHILS # BLD AUTO: 0 K/UL — SIGNIFICANT CHANGE UP (ref 0–0.2)
BASOPHILS NFR BLD AUTO: 0 % — SIGNIFICANT CHANGE UP (ref 0–2)
BASOPHILS NFR BLD AUTO: 0 % — SIGNIFICANT CHANGE UP (ref 0–2)
BILIRUB SERPL-MCNC: 0.3 MG/DL — SIGNIFICANT CHANGE UP (ref 0.2–1.2)
BILIRUB SERPL-MCNC: 0.3 MG/DL — SIGNIFICANT CHANGE UP (ref 0.2–1.2)
BILIRUB UR-MCNC: ABNORMAL
BILIRUB UR-MCNC: ABNORMAL
BLD GP AB SCN SERPL QL: NEGATIVE — SIGNIFICANT CHANGE UP
BLD GP AB SCN SERPL QL: NEGATIVE — SIGNIFICANT CHANGE UP
BUN SERPL-MCNC: 15 MG/DL — SIGNIFICANT CHANGE UP (ref 7–23)
BUN SERPL-MCNC: 15 MG/DL — SIGNIFICANT CHANGE UP (ref 7–23)
BUN SERPL-MCNC: 19 MG/DL — SIGNIFICANT CHANGE UP (ref 7–23)
BUN SERPL-MCNC: 19 MG/DL — SIGNIFICANT CHANGE UP (ref 7–23)
CALCIUM SERPL-MCNC: 9.2 MG/DL — SIGNIFICANT CHANGE UP (ref 8.4–10.5)
CALCIUM SERPL-MCNC: 9.2 MG/DL — SIGNIFICANT CHANGE UP (ref 8.4–10.5)
CALCIUM SERPL-MCNC: 9.6 MG/DL — SIGNIFICANT CHANGE UP (ref 8.4–10.5)
CALCIUM SERPL-MCNC: 9.6 MG/DL — SIGNIFICANT CHANGE UP (ref 8.4–10.5)
CAST: 0 /LPF — SIGNIFICANT CHANGE UP (ref 0–4)
CAST: 0 /LPF — SIGNIFICANT CHANGE UP (ref 0–4)
CHLORIDE SERPL-SCNC: 100 MMOL/L — SIGNIFICANT CHANGE UP (ref 96–108)
CHLORIDE SERPL-SCNC: 100 MMOL/L — SIGNIFICANT CHANGE UP (ref 96–108)
CHLORIDE SERPL-SCNC: 101 MMOL/L — SIGNIFICANT CHANGE UP (ref 96–108)
CHLORIDE SERPL-SCNC: 101 MMOL/L — SIGNIFICANT CHANGE UP (ref 96–108)
CO2 SERPL-SCNC: 22 MMOL/L — SIGNIFICANT CHANGE UP (ref 22–31)
CO2 SERPL-SCNC: 22 MMOL/L — SIGNIFICANT CHANGE UP (ref 22–31)
CO2 SERPL-SCNC: 25 MMOL/L — SIGNIFICANT CHANGE UP (ref 22–31)
CO2 SERPL-SCNC: 25 MMOL/L — SIGNIFICANT CHANGE UP (ref 22–31)
COLOR SPEC: ABNORMAL
COLOR SPEC: ABNORMAL
CREAT SERPL-MCNC: 0.96 MG/DL — SIGNIFICANT CHANGE UP (ref 0.5–1.3)
CREAT SERPL-MCNC: 0.96 MG/DL — SIGNIFICANT CHANGE UP (ref 0.5–1.3)
CREAT SERPL-MCNC: 1.14 MG/DL — SIGNIFICANT CHANGE UP (ref 0.5–1.3)
CREAT SERPL-MCNC: 1.14 MG/DL — SIGNIFICANT CHANGE UP (ref 0.5–1.3)
DIFF PNL FLD: ABNORMAL
DIFF PNL FLD: ABNORMAL
EGFR: 77 ML/MIN/1.73M2 — SIGNIFICANT CHANGE UP
EGFR: 77 ML/MIN/1.73M2 — SIGNIFICANT CHANGE UP
EGFR: 95 ML/MIN/1.73M2 — SIGNIFICANT CHANGE UP
EGFR: 95 ML/MIN/1.73M2 — SIGNIFICANT CHANGE UP
EOSINOPHIL # BLD AUTO: 0 K/UL — SIGNIFICANT CHANGE UP (ref 0–0.5)
EOSINOPHIL # BLD AUTO: 0 K/UL — SIGNIFICANT CHANGE UP (ref 0–0.5)
EOSINOPHIL NFR BLD AUTO: 0 % — SIGNIFICANT CHANGE UP (ref 0–6)
EOSINOPHIL NFR BLD AUTO: 0 % — SIGNIFICANT CHANGE UP (ref 0–6)
GIANT PLATELETS BLD QL SMEAR: PRESENT — SIGNIFICANT CHANGE UP
GIANT PLATELETS BLD QL SMEAR: PRESENT — SIGNIFICANT CHANGE UP
GLUCOSE SERPL-MCNC: 145 MG/DL — HIGH (ref 70–99)
GLUCOSE SERPL-MCNC: 145 MG/DL — HIGH (ref 70–99)
GLUCOSE SERPL-MCNC: 215 MG/DL — HIGH (ref 70–99)
GLUCOSE SERPL-MCNC: 215 MG/DL — HIGH (ref 70–99)
GLUCOSE UR QL: NEGATIVE MG/DL — SIGNIFICANT CHANGE UP
GLUCOSE UR QL: NEGATIVE MG/DL — SIGNIFICANT CHANGE UP
HCT VFR BLD CALC: 39.3 % — SIGNIFICANT CHANGE UP (ref 39–50)
HCT VFR BLD CALC: 39.3 % — SIGNIFICANT CHANGE UP (ref 39–50)
HCT VFR BLD CALC: 42 % — SIGNIFICANT CHANGE UP (ref 39–50)
HCT VFR BLD CALC: 42 % — SIGNIFICANT CHANGE UP (ref 39–50)
HCT VFR BLD CALC: 42.5 % — SIGNIFICANT CHANGE UP (ref 39–50)
HCT VFR BLD CALC: 42.5 % — SIGNIFICANT CHANGE UP (ref 39–50)
HGB BLD-MCNC: 13.1 G/DL — SIGNIFICANT CHANGE UP (ref 13–17)
HGB BLD-MCNC: 13.1 G/DL — SIGNIFICANT CHANGE UP (ref 13–17)
HGB BLD-MCNC: 13.4 G/DL — SIGNIFICANT CHANGE UP (ref 13–17)
HGB BLD-MCNC: 13.4 G/DL — SIGNIFICANT CHANGE UP (ref 13–17)
HGB BLD-MCNC: 14.1 G/DL — SIGNIFICANT CHANGE UP (ref 13–17)
HGB BLD-MCNC: 14.1 G/DL — SIGNIFICANT CHANGE UP (ref 13–17)
INR BLD: 1.02 RATIO — SIGNIFICANT CHANGE UP (ref 0.85–1.18)
INR BLD: 1.02 RATIO — SIGNIFICANT CHANGE UP (ref 0.85–1.18)
KETONES UR-MCNC: NEGATIVE MG/DL — SIGNIFICANT CHANGE UP
KETONES UR-MCNC: NEGATIVE MG/DL — SIGNIFICANT CHANGE UP
LEUKOCYTE ESTERASE UR-ACNC: ABNORMAL
LEUKOCYTE ESTERASE UR-ACNC: ABNORMAL
LYMPHOCYTES # BLD AUTO: 0.35 K/UL — LOW (ref 1–3.3)
LYMPHOCYTES # BLD AUTO: 0.35 K/UL — LOW (ref 1–3.3)
LYMPHOCYTES # BLD AUTO: 2.6 % — LOW (ref 13–44)
LYMPHOCYTES # BLD AUTO: 2.6 % — LOW (ref 13–44)
MANUAL SMEAR VERIFICATION: SIGNIFICANT CHANGE UP
MANUAL SMEAR VERIFICATION: SIGNIFICANT CHANGE UP
MCHC RBC-ENTMCNC: 29.5 PG — SIGNIFICANT CHANGE UP (ref 27–34)
MCHC RBC-ENTMCNC: 29.5 PG — SIGNIFICANT CHANGE UP (ref 27–34)
MCHC RBC-ENTMCNC: 30 PG — SIGNIFICANT CHANGE UP (ref 27–34)
MCHC RBC-ENTMCNC: 30 PG — SIGNIFICANT CHANGE UP (ref 27–34)
MCHC RBC-ENTMCNC: 30.1 PG — SIGNIFICANT CHANGE UP (ref 27–34)
MCHC RBC-ENTMCNC: 30.1 PG — SIGNIFICANT CHANGE UP (ref 27–34)
MCHC RBC-ENTMCNC: 31.9 GM/DL — LOW (ref 32–36)
MCHC RBC-ENTMCNC: 31.9 GM/DL — LOW (ref 32–36)
MCHC RBC-ENTMCNC: 33.2 GM/DL — SIGNIFICANT CHANGE UP (ref 32–36)
MCHC RBC-ENTMCNC: 33.2 GM/DL — SIGNIFICANT CHANGE UP (ref 32–36)
MCHC RBC-ENTMCNC: 33.3 GM/DL — SIGNIFICANT CHANGE UP (ref 32–36)
MCHC RBC-ENTMCNC: 33.3 GM/DL — SIGNIFICANT CHANGE UP (ref 32–36)
MCV RBC AUTO: 89.9 FL — SIGNIFICANT CHANGE UP (ref 80–100)
MCV RBC AUTO: 89.9 FL — SIGNIFICANT CHANGE UP (ref 80–100)
MCV RBC AUTO: 90.8 FL — SIGNIFICANT CHANGE UP (ref 80–100)
MCV RBC AUTO: 90.8 FL — SIGNIFICANT CHANGE UP (ref 80–100)
MCV RBC AUTO: 92.5 FL — SIGNIFICANT CHANGE UP (ref 80–100)
MCV RBC AUTO: 92.5 FL — SIGNIFICANT CHANGE UP (ref 80–100)
MONOCYTES # BLD AUTO: 0.12 K/UL — SIGNIFICANT CHANGE UP (ref 0–0.9)
MONOCYTES # BLD AUTO: 0.12 K/UL — SIGNIFICANT CHANGE UP (ref 0–0.9)
MONOCYTES NFR BLD AUTO: 0.9 % — LOW (ref 2–14)
MONOCYTES NFR BLD AUTO: 0.9 % — LOW (ref 2–14)
NEUTROPHILS # BLD AUTO: 13.12 K/UL — HIGH (ref 1.8–7.4)
NEUTROPHILS # BLD AUTO: 13.12 K/UL — HIGH (ref 1.8–7.4)
NEUTROPHILS NFR BLD AUTO: 96.5 % — HIGH (ref 43–77)
NEUTROPHILS NFR BLD AUTO: 96.5 % — HIGH (ref 43–77)
NITRITE UR-MCNC: POSITIVE
NITRITE UR-MCNC: POSITIVE
NRBC # BLD: 0 /100 WBCS — SIGNIFICANT CHANGE UP (ref 0–0)
PH UR: 6 — SIGNIFICANT CHANGE UP (ref 5–8)
PH UR: 6 — SIGNIFICANT CHANGE UP (ref 5–8)
PLAT MORPH BLD: NORMAL — SIGNIFICANT CHANGE UP
PLAT MORPH BLD: NORMAL — SIGNIFICANT CHANGE UP
PLATELET # BLD AUTO: 185 K/UL — SIGNIFICANT CHANGE UP (ref 150–400)
PLATELET # BLD AUTO: 185 K/UL — SIGNIFICANT CHANGE UP (ref 150–400)
PLATELET # BLD AUTO: 200 K/UL — SIGNIFICANT CHANGE UP (ref 150–400)
PLATELET # BLD AUTO: 200 K/UL — SIGNIFICANT CHANGE UP (ref 150–400)
PLATELET # BLD AUTO: 202 K/UL — SIGNIFICANT CHANGE UP (ref 150–400)
PLATELET # BLD AUTO: 202 K/UL — SIGNIFICANT CHANGE UP (ref 150–400)
PLATELET COUNT - ESTIMATE: NORMAL — SIGNIFICANT CHANGE UP
PLATELET COUNT - ESTIMATE: NORMAL — SIGNIFICANT CHANGE UP
POTASSIUM SERPL-MCNC: 3.7 MMOL/L — SIGNIFICANT CHANGE UP (ref 3.5–5.3)
POTASSIUM SERPL-MCNC: 3.7 MMOL/L — SIGNIFICANT CHANGE UP (ref 3.5–5.3)
POTASSIUM SERPL-MCNC: 4.2 MMOL/L — SIGNIFICANT CHANGE UP (ref 3.5–5.3)
POTASSIUM SERPL-MCNC: 4.2 MMOL/L — SIGNIFICANT CHANGE UP (ref 3.5–5.3)
POTASSIUM SERPL-SCNC: 3.7 MMOL/L — SIGNIFICANT CHANGE UP (ref 3.5–5.3)
POTASSIUM SERPL-SCNC: 3.7 MMOL/L — SIGNIFICANT CHANGE UP (ref 3.5–5.3)
POTASSIUM SERPL-SCNC: 4.2 MMOL/L — SIGNIFICANT CHANGE UP (ref 3.5–5.3)
POTASSIUM SERPL-SCNC: 4.2 MMOL/L — SIGNIFICANT CHANGE UP (ref 3.5–5.3)
PROT SERPL-MCNC: 8.1 G/DL — SIGNIFICANT CHANGE UP (ref 6–8.3)
PROT SERPL-MCNC: 8.1 G/DL — SIGNIFICANT CHANGE UP (ref 6–8.3)
PROT UR-MCNC: 300 MG/DL
PROT UR-MCNC: 300 MG/DL
PROTHROM AB SERPL-ACNC: 11.2 SEC — SIGNIFICANT CHANGE UP (ref 9.5–13)
PROTHROM AB SERPL-ACNC: 11.2 SEC — SIGNIFICANT CHANGE UP (ref 9.5–13)
RBC # BLD: 4.37 M/UL — SIGNIFICANT CHANGE UP (ref 4.2–5.8)
RBC # BLD: 4.37 M/UL — SIGNIFICANT CHANGE UP (ref 4.2–5.8)
RBC # BLD: 4.54 M/UL — SIGNIFICANT CHANGE UP (ref 4.2–5.8)
RBC # BLD: 4.54 M/UL — SIGNIFICANT CHANGE UP (ref 4.2–5.8)
RBC # BLD: 4.68 M/UL — SIGNIFICANT CHANGE UP (ref 4.2–5.8)
RBC # BLD: 4.68 M/UL — SIGNIFICANT CHANGE UP (ref 4.2–5.8)
RBC # FLD: 12.8 % — SIGNIFICANT CHANGE UP (ref 10.3–14.5)
RBC # FLD: 12.8 % — SIGNIFICANT CHANGE UP (ref 10.3–14.5)
RBC # FLD: 12.9 % — SIGNIFICANT CHANGE UP (ref 10.3–14.5)
RBC # FLD: 12.9 % — SIGNIFICANT CHANGE UP (ref 10.3–14.5)
RBC # FLD: 13 % — SIGNIFICANT CHANGE UP (ref 10.3–14.5)
RBC # FLD: 13 % — SIGNIFICANT CHANGE UP (ref 10.3–14.5)
RBC BLD AUTO: NORMAL — SIGNIFICANT CHANGE UP
RBC BLD AUTO: NORMAL — SIGNIFICANT CHANGE UP
RBC CASTS # UR COMP ASSIST: 487 /HPF — HIGH (ref 0–4)
RBC CASTS # UR COMP ASSIST: 487 /HPF — HIGH (ref 0–4)
REVIEW: SIGNIFICANT CHANGE UP
REVIEW: SIGNIFICANT CHANGE UP
RH IG SCN BLD-IMP: NEGATIVE — SIGNIFICANT CHANGE UP
RH IG SCN BLD-IMP: NEGATIVE — SIGNIFICANT CHANGE UP
SODIUM SERPL-SCNC: 138 MMOL/L — SIGNIFICANT CHANGE UP (ref 135–145)
SP GR SPEC: 1.01 — SIGNIFICANT CHANGE UP (ref 1–1.03)
SP GR SPEC: 1.01 — SIGNIFICANT CHANGE UP (ref 1–1.03)
SQUAMOUS # UR AUTO: 1 /HPF — SIGNIFICANT CHANGE UP (ref 0–5)
SQUAMOUS # UR AUTO: 1 /HPF — SIGNIFICANT CHANGE UP (ref 0–5)
UROBILINOGEN FLD QL: 0.2 MG/DL — SIGNIFICANT CHANGE UP (ref 0.2–1)
UROBILINOGEN FLD QL: 0.2 MG/DL — SIGNIFICANT CHANGE UP (ref 0.2–1)
WBC # BLD: 11.51 K/UL — HIGH (ref 3.8–10.5)
WBC # BLD: 11.51 K/UL — HIGH (ref 3.8–10.5)
WBC # BLD: 13.6 K/UL — HIGH (ref 3.8–10.5)
WBC # BLD: 13.6 K/UL — HIGH (ref 3.8–10.5)
WBC # BLD: 9.82 K/UL — SIGNIFICANT CHANGE UP (ref 3.8–10.5)
WBC # BLD: 9.82 K/UL — SIGNIFICANT CHANGE UP (ref 3.8–10.5)
WBC # FLD AUTO: 11.51 K/UL — HIGH (ref 3.8–10.5)
WBC # FLD AUTO: 11.51 K/UL — HIGH (ref 3.8–10.5)
WBC # FLD AUTO: 13.6 K/UL — HIGH (ref 3.8–10.5)
WBC # FLD AUTO: 13.6 K/UL — HIGH (ref 3.8–10.5)
WBC # FLD AUTO: 9.82 K/UL — SIGNIFICANT CHANGE UP (ref 3.8–10.5)
WBC # FLD AUTO: 9.82 K/UL — SIGNIFICANT CHANGE UP (ref 3.8–10.5)
WBC UR QL: 9 /HPF — HIGH (ref 0–5)
WBC UR QL: 9 /HPF — HIGH (ref 0–5)

## 2024-01-10 PROCEDURE — 99222 1ST HOSP IP/OBS MODERATE 55: CPT

## 2024-01-10 PROCEDURE — 99221 1ST HOSP IP/OBS SF/LOW 40: CPT

## 2024-01-10 PROCEDURE — 93970 EXTREMITY STUDY: CPT | Mod: 26

## 2024-01-10 PROCEDURE — 74176 CT ABD & PELVIS W/O CONTRAST: CPT | Mod: 26,MA

## 2024-01-10 RX ORDER — CEFTRIAXONE 500 MG/1
1000 INJECTION, POWDER, FOR SOLUTION INTRAMUSCULAR; INTRAVENOUS ONCE
Refills: 0 | Status: COMPLETED | OUTPATIENT
Start: 2024-01-10 | End: 2024-01-10

## 2024-01-10 RX ORDER — PANTOPRAZOLE SODIUM 20 MG/1
40 TABLET, DELAYED RELEASE ORAL
Refills: 0 | Status: DISCONTINUED | OUTPATIENT
Start: 2024-01-10 | End: 2024-01-22

## 2024-01-10 RX ORDER — CEFTRIAXONE 500 MG/1
1000 INJECTION, POWDER, FOR SOLUTION INTRAMUSCULAR; INTRAVENOUS EVERY 24 HOURS
Refills: 0 | Status: COMPLETED | OUTPATIENT
Start: 2024-01-10 | End: 2024-01-12

## 2024-01-10 RX ORDER — GABAPENTIN 400 MG/1
300 CAPSULE ORAL
Refills: 0 | Status: DISCONTINUED | OUTPATIENT
Start: 2024-01-10 | End: 2024-01-22

## 2024-01-10 RX ORDER — LEVETIRACETAM 250 MG/1
250 TABLET, FILM COATED ORAL
Refills: 0 | Status: DISCONTINUED | OUTPATIENT
Start: 2024-01-10 | End: 2024-01-10

## 2024-01-10 RX ORDER — ASPIRIN/CALCIUM CARB/MAGNESIUM 324 MG
81 TABLET ORAL DAILY
Refills: 0 | Status: DISCONTINUED | OUTPATIENT
Start: 2024-01-10 | End: 2024-01-22

## 2024-01-10 RX ORDER — LACOSAMIDE 50 MG/1
100 TABLET ORAL
Refills: 0 | Status: DISCONTINUED | OUTPATIENT
Start: 2024-01-10 | End: 2024-01-10

## 2024-01-10 RX ORDER — CEFTRIAXONE 500 MG/1
2000 INJECTION, POWDER, FOR SOLUTION INTRAMUSCULAR; INTRAVENOUS EVERY 24 HOURS
Refills: 0 | Status: DISCONTINUED | OUTPATIENT
Start: 2024-01-11 | End: 2024-01-11

## 2024-01-10 RX ORDER — CEFTRIAXONE 500 MG/1
2000 INJECTION, POWDER, FOR SOLUTION INTRAMUSCULAR; INTRAVENOUS EVERY 24 HOURS
Refills: 0 | Status: DISCONTINUED | OUTPATIENT
Start: 2024-01-10 | End: 2024-01-10

## 2024-01-10 RX ORDER — ATORVASTATIN CALCIUM 80 MG/1
20 TABLET, FILM COATED ORAL AT BEDTIME
Refills: 0 | Status: DISCONTINUED | OUTPATIENT
Start: 2024-01-10 | End: 2024-01-22

## 2024-01-10 RX ORDER — LACOSAMIDE 50 MG/1
150 TABLET ORAL
Refills: 0 | Status: DISCONTINUED | OUTPATIENT
Start: 2024-01-10 | End: 2024-01-22

## 2024-01-10 RX ORDER — PANTOPRAZOLE SODIUM 20 MG/1
1 TABLET, DELAYED RELEASE ORAL
Refills: 0 | DISCHARGE

## 2024-01-10 RX ORDER — LEVETIRACETAM 250 MG/1
1 TABLET, FILM COATED ORAL
Refills: 0 | DISCHARGE

## 2024-01-10 RX ORDER — TAMSULOSIN HYDROCHLORIDE 0.4 MG/1
0.4 CAPSULE ORAL AT BEDTIME
Refills: 0 | Status: DISCONTINUED | OUTPATIENT
Start: 2024-01-10 | End: 2024-01-22

## 2024-01-10 RX ORDER — SODIUM CHLORIDE 9 MG/ML
1000 INJECTION INTRAMUSCULAR; INTRAVENOUS; SUBCUTANEOUS
Refills: 0 | Status: DISCONTINUED | OUTPATIENT
Start: 2024-01-10 | End: 2024-01-22

## 2024-01-10 RX ORDER — GABAPENTIN 400 MG/1
1 CAPSULE ORAL
Refills: 0 | DISCHARGE

## 2024-01-10 RX ORDER — LACOSAMIDE 50 MG/1
1 TABLET ORAL
Qty: 0 | Refills: 0 | DISCHARGE

## 2024-01-10 RX ADMIN — ATORVASTATIN CALCIUM 20 MILLIGRAM(S): 80 TABLET, FILM COATED ORAL at 22:59

## 2024-01-10 RX ADMIN — TAMSULOSIN HYDROCHLORIDE 0.4 MILLIGRAM(S): 0.4 CAPSULE ORAL at 22:59

## 2024-01-10 RX ADMIN — CEFTRIAXONE 100 MILLIGRAM(S): 500 INJECTION, POWDER, FOR SOLUTION INTRAMUSCULAR; INTRAVENOUS at 04:54

## 2024-01-10 RX ADMIN — LACOSAMIDE 150 MILLIGRAM(S): 50 TABLET ORAL at 18:58

## 2024-01-10 RX ADMIN — GABAPENTIN 300 MILLIGRAM(S): 400 CAPSULE ORAL at 18:58

## 2024-01-10 RX ADMIN — SODIUM CHLORIDE 50 MILLILITER(S): 9 INJECTION INTRAMUSCULAR; INTRAVENOUS; SUBCUTANEOUS at 23:01

## 2024-01-10 NOTE — CONSULT NOTE ADULT - ASSESSMENT
53 year old male with bilateral lower extremity below knee DVTs. No limb threatening symptoms at this time - no phlegmasia or vascular compromise. No symptoms or physical exam findings of PAD. Has history of LLE DVT causing compartment syndrome requiring fasciotomy.    On AC at home (Lovenox managed by hematology). AC on hold in setting of hematuria.    Plan:  - Resume AC when able at discretion of primary team  - No acute vascular surgery interventions at this time    Discussed with vascular surgery fellow, Dr. Nicole, on behalf of vascular surgery attending, Dr. Merchant.      Vascular Surgery  [id number]

## 2024-01-10 NOTE — CONSULT NOTE ADULT - SUBJECTIVE AND OBJECTIVE BOX
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  Saw his urologist today was started on an antibiotic (pt unsure which). Patient also reports several episodes of severe intermittent left flank pain today, denies history of kidney stones to his knowledge.  Denies associated nausea, vomiting, diarrhea, dysuria, fever, chill     PAST MEDICAL & SURGICAL HISTORY:  History of TIAs      CVA (cerebrovascular accident)      HLD (hyperlipidemia)      Vertigo      Unresponsiveness      History of fasciotomy          Allergies: Allergies    No Known Allergies    Intolerances        Social History: Denies toxic habits including tobacco, ETOH or illicit drugs.    Family History: FAMILY HISTORY:  No pertinent family history in first degree relatives    . No family history of strokes    Medications: MEDICATIONS  (STANDING):  cefTRIAXone   IVPB 1000 milliGRAM(s) IV Intermittent every 24 hours  lacosamide 100 milliGRAM(s) Oral two times a day  levETIRAcetam 250 milliGRAM(s) Oral two times a day    MEDICATIONS  (PRN):      Review of Systems:  CONSTITUTIONAL:  No weight loss, fever, chills, weakness or fatigue.  HEENT:  Eyes:  No visual loss, blurred vision, double vision or yellow sclera. Ears, Nose, Throat:  No hearing loss, sneezing, congestion, runny nose or sore throat.  SKIN:  No rash or itching.  CARDIOVASCULAR:  No chest pain, chest pressure or chest discomfort. No palpitations or edema.  RESPIRATORY:  No shortness of breath, cough or sputum.  GASTROINTESTINAL:  No anorexia, nausea, vomiting or diarrhea. No abdominal pain or blood.  GENITOURINARY:  No burning on urination or incontinence   NEUROLOGICAL:  No headache, dizziness, syncope, paralysis, ataxia, numbness or tingling in the extremities. No change in bowel or bladder control. no limb weakness. no vision changes.   MUSCULOSKELETAL:  No muscle, back pain, joint pain or stiffness.  HEMATOLOGIC:  No anemia, bleeding or bruising.  LYMPHATICS:  No enlarged nodes. No history of splenectomy.  PSYCHIATRIC:  No history of depression or anxiety.  ENDOCRINOLOGIC:  No reports of sweating, cold or heat intolerance. No polyuria or polydipsia.      Vitals:  Vital Signs Last 24 Hrs  T(C): 36.3 (10 Gerardo 2024 07:45), Max: 36.6 (2024 23:53)  T(F): 97.4 (10 Gerardo 2024 07:45), Max: 97.8 (2024 23:53)  HR: 74 (10 Gerardo 2024 07:45) (66 - 80)  BP: 118/76 (10 Gerardo 2024 07:45) (111/70 - 146/98)  BP(mean): --  RR: 17 (10 Gerardo 2024 07:45) (16 - 18)  SpO2: 100% (10 Gerardo 2024 07:45) (97% - 100%)    Parameters below as of 10 Gerardo 2024 07:45  Patient On (Oxygen Delivery Method): room air        General Exam:   General Appearance: Appropriately dressed and in no acute distress       Head: Normocephalic, atraumatic and no dysmorphic features  Ear, Nose, and Throat: Moist mucous membranes  CVS: S1S2+  Resp: No SOB, no wheeze or rhonchi  GI: soft NT/ND  Extremities: No edema or cyanosis  Skin: No bruises or rashes     Neurological Exam:  MS: Eyes open. Awake, alert, oriented to person, place, situation, time. Follows all commands. Attention/concentration, recent and remote memory, and fund of knowledge intact  Language: Speech is clear, fluent with good repetition & comprehension.  CNs: PERRL (R = 3mm, L = 3mm). VFF. EOMI No nystagmus.  No provocation on dizziness with head turning. V1-3 intact to LT b/l. No facial asymmetry b/l, full eye closure strength b/l. Hearing grossly normal (rubbing fingers) b/l. Tongue midline, normal movements, no atrophy. Palate with symmetric elevation. Trap 5/5 b/l  Motor: Normal muscle bulk & tone. No noticeable tremor. No pronator drift. No drift of UE or LE b/l.               Deltoid	Biceps	Triceps	   R	         5	      5	   5	 5	  		 	  L	         5	      5	   5	 5	  		    	H-Flex	K-Flex	K-Ext	D-Flex	P-Flex  R	    5	  5	   5	  5	    5		   L	    5	   5	   5	  4	    5		   *Some pain limitation on LLE.   Sensation: Intact to LT b/l throughout.   Cortical: Extinction on DSS (neglect): none  Reflexes:              Biceps(C5)       BR(C6)     Triceps(C7)               Patellar(L4)    Achilles(S1)    Plantar Resp  R	              3	          3	             3		 3		    3	Withdrawal       L	              3	          3	             3		 2		    1	Withdrawal    Coordination: No dysmetria to FTN/HTS b/l.   Gait: Deferred.     Data/Labs/Imaging which I personally reviewed.     Labs:     CBC Full  -  ( 10 Gerardo 2024 07:38 )  WBC Count : 9.82 K/uL  RBC Count : 4.54 M/uL  Hemoglobin : 13.4 g/dL  Hematocrit : 42.0 %  Platelet Count - Automated : 200 K/uL  Mean Cell Volume : 92.5 fl  Mean Cell Hemoglobin : 29.5 pg  Mean Cell Hemoglobin Concentration : 31.9 gm/dL  Auto Neutrophil # : x  Auto Lymphocyte # : x  Auto Monocyte # : x  Auto Eosinophil # : x  Auto Basophil # : x  Auto Neutrophil % : x  Auto Lymphocyte % : x  Auto Monocyte % : x  Auto Eosinophil % : x  Auto Basophil % : x    -10    138  |  101  |  15  ----------------------------<  215<H>  3.7   |  22  |  0.96    Ca    9.2      10 Gerardo 2024 07:38    TPro  8.1  /  Alb  4.7  /  TBili  0.3  /  DBili  x   /  AST  23  /  ALT  27  /  AlkPhos  78  01-10    LIVER FUNCTIONS - ( 10 Gerardo 2024 00:31 )  Alb: 4.7 g/dL / Pro: 8.1 g/dL / ALK PHOS: 78 U/L / ALT: 27 U/L / AST: 23 U/L / GGT: x           PT/INR - ( 10 Gerardo 2024 00:31 )   PT: 11.2 sec;   INR: 1.02 ratio         PTT - ( 10 Gerardo 2024 00:31 )  PTT:37.0 sec  Urinalysis Basic - ( 10 Gerardo 2024 07:38 )    Color: Red / Appearance: Clear / S.008 / pH: x  Gluc: 215 mg/dL / Ketone: Negative mg/dL  / Bili: Small / Urobili: 0.2 mg/dL   Blood: x / Protein: 300 mg/dL / Nitrite: Positive   Leuk Esterase: Moderate / RBC: 487 /HPF / WBC 9 /HPF   Sq Epi: x / Non Sq Epi: 1 /HPF / Bacteria: Negative /HPF           Neurology Consult    Reason for Consult: Patient is a 53y old  Male who presents with a chief complaint of     HPI:  53-year-old male with extensive PMHx including CVA with residual word finding difficulty, 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  Saw his urologist today was started on an antibiotic (pt unsure which). Patient also reports several episodes of severe intermittent left flank pain today, denies history of kidney stones to his knowledge.  Denies associated nausea, vomiting, diarrhea, dysuria, fever, chill     PAST MEDICAL & SURGICAL HISTORY:  History of TIAs      CVA (cerebrovascular accident)      HLD (hyperlipidemia)      Vertigo      Unresponsiveness      History of fasciotomy          Allergies: Allergies    No Known Allergies    Intolerances        Social History: Denies toxic habits including tobacco, ETOH or illicit drugs.    Family History: FAMILY HISTORY:  No pertinent family history in first degree relatives    . No family history of strokes    Medications: MEDICATIONS  (STANDING):  cefTRIAXone   IVPB 1000 milliGRAM(s) IV Intermittent every 24 hours  lacosamide 100 milliGRAM(s) Oral two times a day  levETIRAcetam 250 milliGRAM(s) Oral two times a day    MEDICATIONS  (PRN):      Review of Systems:  CONSTITUTIONAL:  No weight loss, fever, chills, weakness or fatigue.  HEENT:  Eyes:  No visual loss, blurred vision, double vision or yellow sclera. Ears, Nose, Throat:  No hearing loss, sneezing, congestion, runny nose or sore throat.  SKIN:  No rash or itching.  CARDIOVASCULAR:  No chest pain, chest pressure or chest discomfort. No palpitations or edema.  RESPIRATORY:  No shortness of breath, cough or sputum.  GASTROINTESTINAL:  No anorexia, nausea, vomiting or diarrhea. No abdominal pain or blood.  GENITOURINARY:  No burning on urination or incontinence   NEUROLOGICAL:  No headache, dizziness, syncope, paralysis, ataxia, numbness or tingling in the extremities. No change in bowel or bladder control. no limb weakness. no vision changes.   MUSCULOSKELETAL:  No muscle, back pain, joint pain or stiffness.  HEMATOLOGIC:  No anemia, bleeding or bruising.  LYMPHATICS:  No enlarged nodes. No history of splenectomy.  PSYCHIATRIC:  No history of depression or anxiety.  ENDOCRINOLOGIC:  No reports of sweating, cold or heat intolerance. No polyuria or polydipsia.      Vitals:  Vital Signs Last 24 Hrs  T(C): 36.3 (10 Gerardo 2024 07:45), Max: 36.6 (2024 23:53)  T(F): 97.4 (10 Gerardo 2024 07:45), Max: 97.8 (2024 23:53)  HR: 74 (10 Gerardo 2024 07:45) (66 - 80)  BP: 118/76 (10 Gerardo 2024 07:45) (111/70 - 146/98)  BP(mean): --  RR: 17 (10 Gerardo 2024 07:45) (16 - 18)  SpO2: 100% (10 Gerardo 2024 07:45) (97% - 100%)    Parameters below as of 10 Gerardo 2024 07:45  Patient On (Oxygen Delivery Method): room air        General Exam:   General Appearance: Appropriately dressed and in no acute distress       Head: Normocephalic, atraumatic and no dysmorphic features  Ear, Nose, and Throat: Moist mucous membranes  CVS: S1S2+  Resp: No SOB, no wheeze or rhonchi  GI: soft NT/ND  Extremities: No edema or cyanosis  Skin: No bruises or rashes     Neurological Exam:  MS: Eyes open. Awake, alert, oriented to person, place, situation, time. Follows all commands. Attention/concentration, recent and remote memory, and fund of knowledge intact  Language: Speech is clear, fluent with good repetition & comprehension.  CNs: PERRL (R = 3mm, L = 3mm). VFF. EOMI No nystagmus.  No provocation on dizziness with head turning. V1-3 intact to LT b/l. No facial asymmetry b/l, full eye closure strength b/l. Hearing grossly normal (rubbing fingers) b/l. Tongue midline, normal movements, no atrophy. Palate with symmetric elevation. Trap 5/5 b/l  Motor: Normal muscle bulk & tone. No noticeable tremor. No pronator drift. No drift of UE or LE b/l.               Deltoid	Biceps	Triceps	   R	         5	      5	   5	 5	  		 	  L	         5	      5	   5	 5	  		    	H-Flex	K-Flex	K-Ext	D-Flex	P-Flex  R	    5	  5	   5	  5	    5		   L	    5	   5	   5	  4	    5		   *Some pain limitation on LLE.   Sensation: Intact to LT b/l throughout.   Cortical: Extinction on DSS (neglect): none  Reflexes:              Biceps(C5)       BR(C6)     Triceps(C7)               Patellar(L4)    Achilles(S1)    Plantar Resp  R	              3	          3	             3		 3		    3	Withdrawal       L	              3	          3	             3		 2		    1	Withdrawal    Coordination: No dysmetria to FTN/HTS b/l.   Gait: Deferred.     Data/Labs/Imaging which I personally reviewed.     Labs:     CBC Full  -  ( 10 Gerardo 2024 07:38 )  WBC Count : 9.82 K/uL  RBC Count : 4.54 M/uL  Hemoglobin : 13.4 g/dL  Hematocrit : 42.0 %  Platelet Count - Automated : 200 K/uL  Mean Cell Volume : 92.5 fl  Mean Cell Hemoglobin : 29.5 pg  Mean Cell Hemoglobin Concentration : 31.9 gm/dL  Auto Neutrophil # : x  Auto Lymphocyte # : x  Auto Monocyte # : x  Auto Eosinophil # : x  Auto Basophil # : x  Auto Neutrophil % : x  Auto Lymphocyte % : x  Auto Monocyte % : x  Auto Eosinophil % : x  Auto Basophil % : x    -10    138  |  101  |  15  ----------------------------<  215<H>  3.7   |  22  |  0.96    Ca    9.2      10 Greardo 2024 07:38    TPro  8.1  /  Alb  4.7  /  TBili  0.3  /  DBili  x   /  AST  23  /  ALT  27  /  AlkPhos  78  01-10    LIVER FUNCTIONS - ( 10 Gerardo 2024 00:31 )  Alb: 4.7 g/dL / Pro: 8.1 g/dL / ALK PHOS: 78 U/L / ALT: 27 U/L / AST: 23 U/L / GGT: x           PT/INR - ( 10 Gerardo 2024 00:31 )   PT: 11.2 sec;   INR: 1.02 ratio         PTT - ( 10 Gerardo 2024 00:31 )  PTT:37.0 sec  Urinalysis Basic - ( 10 Gerardo 2024 07:38 )    Color: Red / Appearance: Clear / S.008 / pH: x  Gluc: 215 mg/dL / Ketone: Negative mg/dL  / Bili: Small / Urobili: 0.2 mg/dL   Blood: x / Protein: 300 mg/dL / Nitrite: Positive   Leuk Esterase: Moderate / RBC: 487 /HPF / WBC 9 /HPF   Sq Epi: x / Non Sq Epi: 1 /HPF / Bacteria: Negative /HPF           Neurology Consult    Reason for Consult: Patient is a 53y old  Male who presents with a chief complaint of     HPI:  53-year-old male with extensive PMHx including CVA with residual word finding difficulty, 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  Saw his urologist today was started on an antibiotic (pt unsure which). Patient also reports several episodes of severe intermittent left flank pain today, denies history of kidney stones to his knowledge.  Denies associated nausea, vomiting, diarrhea, dysuria, fever, chill     PAST MEDICAL & SURGICAL HISTORY:  History of TIAs      CVA (cerebrovascular accident)      HLD (hyperlipidemia)      Vertigo      Unresponsiveness      History of fasciotomy          Allergies: Allergies    No Known Allergies    Intolerances        Social History: Denies toxic habits including tobacco, ETOH or illicit drugs.    Family History: FAMILY HISTORY:  No pertinent family history in first degree relatives    . No family history of strokes    Medications: MEDICATIONS  (STANDING):  cefTRIAXone   IVPB 1000 milliGRAM(s) IV Intermittent every 24 hours  lacosamide 100 milliGRAM(s) Oral two times a day  levETIRAcetam 250 milliGRAM(s) Oral two times a day    MEDICATIONS  (PRN):      Review of Systems:  CONSTITUTIONAL:  No weight loss, fever, chills, weakness or fatigue.  HEENT:  Eyes:  No visual loss, blurred vision, double vision or yellow sclera. Ears, Nose, Throat:  No hearing loss, sneezing, congestion, runny nose or sore throat.  SKIN:  No rash or itching.  CARDIOVASCULAR:  No chest pain, chest pressure or chest discomfort. No palpitations or edema.  RESPIRATORY:  No shortness of breath, cough or sputum.  GASTROINTESTINAL:  No anorexia, nausea, vomiting or diarrhea. No abdominal pain or blood.  GENITOURINARY:   + burning and hematuria   NEUROLOGICAL:  No headache, dizziness, syncope, paralysis, ataxia, numbness or tingling in the extremities. No change in bowel or bladder control. no limb weakness. no vision changes.   MUSCULOSKELETAL:  No muscle, back pain, joint pain or stiffness.  HEMATOLOGIC:  No anemia, bleeding or bruising.  LYMPHATICS:  No enlarged nodes. No history of splenectomy.  PSYCHIATRIC:  No history of depression or anxiety.  ENDOCRINOLOGIC:  No reports of sweating, cold or heat intolerance. No polyuria or polydipsia.      Vitals:  Vital Signs Last 24 Hrs  T(C): 36.3 (10 Gerardo 2024 07:45), Max: 36.6 (2024 23:53)  T(F): 97.4 (10 Gerardo 2024 07:45), Max: 97.8 (2024 23:53)  HR: 74 (10 Gerardo 2024 07:45) (66 - 80)  BP: 118/76 (10 Gerardo 2024 07:45) (111/70 - 146/98)  BP(mean): --  RR: 17 (10 Gerardo 2024 07:45) (16 - 18)  SpO2: 100% (10 Gerardo 2024 07:45) (97% - 100%)    Parameters below as of 10 Gerardo 2024 07:45  Patient On (Oxygen Delivery Method): room air        General Exam:   General Appearance: Appropriately dressed and in no acute distress       Head: Normocephalic, atraumatic and no dysmorphic features  Ear, Nose, and Throat: Moist mucous membranes  CVS: S1S2+  Resp: No SOB, no wheeze or rhonchi  GI: soft NT/ND  Extremities: No edema or cyanosis  Skin: No bruises or rashes     Neurological Exam:  MS: Eyes open. Awake, alert, oriented to person, place, situation, time. Follows all commands. Attention/concentration, recent and remote memory, and fund of knowledge intact  Language: Speech is clear, fluent with good repetition & comprehension.  CNs: PERRL (R = 3mm, L = 3mm). VFF. EOMI No nystagmus.  No provocation on dizziness with head turning. V1-3 intact to LT b/l. No facial asymmetry b/l, full eye closure strength b/l. Hearing grossly normal (rubbing fingers) b/l. Tongue midline, normal movements, no atrophy. Palate with symmetric elevation. Trap 5/5 b/l  Motor: Normal muscle bulk & tone. No noticeable tremor. No pronator drift. No drift of UE or LE b/l.               Deltoid	Biceps	Triceps	   R	         5	      5	   5	 5	  		 	  L	         5	      5	   5	 5	  		    	H-Flex	K-Flex	K-Ext	D-Flex	P-Flex  R	    5	  5	   5	  5	    5		   L	    5	   5	   5	  4	    5		   *Some pain limitation on LLE.   Sensation: Intact to LT b/l throughout.   Cortical: Extinction on DSS (neglect): none  Reflexes:              Biceps(C5)       BR(C6)     Triceps(C7)               Patellar(L4)    Achilles(S1)    Plantar Resp  R	              3	          3	             3		 3		    3	Withdrawal       L	              3	          3	             3		 2		    1	Withdrawal    Coordination: No dysmetria to FTN/HTS b/l.   Gait: Deferred.     Data/Labs/Imaging which I personally reviewed.     Labs:     CBC Full  -  ( 10 Gerardo 2024 07:38 )  WBC Count : 9.82 K/uL  RBC Count : 4.54 M/uL  Hemoglobin : 13.4 g/dL  Hematocrit : 42.0 %  Platelet Count - Automated : 200 K/uL  Mean Cell Volume : 92.5 fl  Mean Cell Hemoglobin : 29.5 pg  Mean Cell Hemoglobin Concentration : 31.9 gm/dL  Auto Neutrophil # : x  Auto Lymphocyte # : x  Auto Monocyte # : x  Auto Eosinophil # : x  Auto Basophil # : x  Auto Neutrophil % : x  Auto Lymphocyte % : x  Auto Monocyte % : x  Auto Eosinophil % : x  Auto Basophil % : x    01-10    138  |  101  |  15  ----------------------------<  215<H>  3.7   |  22  |  0.96    Ca    9.2      10 Gerardo 2024 07:38    TPro  8.1  /  Alb  4.7  /  TBili  0.3  /  DBili  x   /  AST  23  /  ALT  27  /  AlkPhos  78  01-10    LIVER FUNCTIONS - ( 10 Gerardo 2024 00:31 )  Alb: 4.7 g/dL / Pro: 8.1 g/dL / ALK PHOS: 78 U/L / ALT: 27 U/L / AST: 23 U/L / GGT: x           PT/INR - ( 10 Gerardo 2024 00:31 )   PT: 11.2 sec;   INR: 1.02 ratio         PTT - ( 10 Gerardo 2024 00:31 )  PTT:37.0 sec  Urinalysis Basic - ( 10 Gerardo 2024 07:38 )    Color: Red / Appearance: Clear / S.008 / pH: x  Gluc: 215 mg/dL / Ketone: Negative mg/dL  / Bili: Small / Urobili: 0.2 mg/dL   Blood: x / Protein: 300 mg/dL / Nitrite: Positive   Leuk Esterase: Moderate / RBC: 487 /HPF / WBC 9 /HPF   Sq Epi: x / Non Sq Epi: 1 /HPF / Bacteria: Negative /HPF

## 2024-01-10 NOTE — CONSULT NOTE ADULT - SUBJECTIVE AND OBJECTIVE BOX
Vascular Surgery Consult  Consulting attending: Dr. 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 Vascular surgery consulted for bilateral below knee DVTs on bilateral lower extremity venous duplex. He has a history of LLE compartment syndrome 2/2 DVTs in 2023. This required four compartment fasciotomy of LLE (Dr. Smith), subsequent muscle debridement in OR, and closure of fasciotomy wounds by plastic surgery. He currently only complains of neuropathy in the soles of his feet. Otherwise, no motor or sensory complaints. Denies symptoms of claudication and rest pain. Ambulates with walker. Has been compliant with AC which is managed by hematology as an outpatient.      PAST MEDICAL HISTORY:  History of TIAs    CVA (cerebrovascular accident)    HLD (hyperlipidemia)    Vertigo    Unresponsiveness        PAST SURGICAL HISTORY:  No significant past surgical history    History of fasciotomy        MEDICATIONS:  aspirin enteric coated 81 milliGRAM(s) Oral daily  atorvastatin 20 milliGRAM(s) Oral at bedtime  cefTRIAXone   IVPB 1000 milliGRAM(s) IV Intermittent every 24 hours  gabapentin 300 milliGRAM(s) Oral two times a day  lacosamide 150 milliGRAM(s) Oral two times a day  multivitamin 1 Tablet(s) Oral daily  pantoprazole    Tablet 40 milliGRAM(s) Oral before breakfast  sodium chloride 0.9%. 1000 milliLiter(s) IV Continuous <Continuous>  tamsulosin 0.4 milliGRAM(s) Oral at bedtime      ALLERGIES:  No Known Allergies      VITALS & I/Os:  Vital Signs Last 24 Hrs  T(C): 36.8 (10 Gerardo 2024 17:30), Max: 36.8 (10 Gerardo 2024 17:30)  T(F): 98.2 (10 Gerardo 2024 17:30), Max: 98.2 (10 Gerardo 2024 17:30)  HR: 83 (10 Gerardo 2024 17:30) (66 - 83)  BP: 119/78 (10 Gerardo 2024 17:30) (111/70 - 146/98)  BP(mean): --  RR: 18 (10 Gerardo 2024 17:30) (16 - 18)  SpO2: 97% (10 Gerardo 2024 17:30) (97% - 100%)    Parameters below as of 10 Gerardo 2024 17:30  Patient On (Oxygen Delivery Method): room air        I&O's Summary      PHYSICAL EXAM:  Gen: NAD  CV: Regular rate  Resp: Nonlabored breathing on room air  Ext:  - Bilateral lower extremities are warm and well perfused  - Well healed LLE fasciotomy sites  - Bilateral lower extremity compartments are compressible  - Bilateral lower extremities without evidence of vascular compromise/phlegmasia  Vasc: Bilateral DP and PT pulses are palpable        LABS:                        13.1   11.51 )-----------( 202      ( 10 Gerardo 2024 17:29 )             39.3     01-10    138  |  101  |  15  ----------------------------<  215<H>  3.7   |  22  |  0.96    Ca    9.2      10 Gerardo 2024 07:38    TPro  8.1  /  Alb  4.7  /  TBili  0.3  /  DBili  x   /  AST  23  /  ALT  27  /  AlkPhos  78  0110    Lactate:    PT/INR - ( 10 Gerardo 2024 00:31 )   PT: 11.2 sec;   INR: 1.02 ratio         PTT - ( 10 Gerardo 2024 00:31 )  PTT:37.0 sec          Urinalysis Basic - ( 10 Gerardo 2024 07:38 )    Color: Red / Appearance: Clear / S.008 / pH: x  Gluc: 215 mg/dL / Ketone: Negative mg/dL  / Bili: Small / Urobili: 0.2 mg/dL   Blood: x / Protein: 300 mg/dL / Nitrite: Positive   Leuk Esterase: Moderate / RBC: 487 /HPF / WBC 9 /HPF   Sq Epi: x / Non Sq Epi: 1 /HPF / Bacteria: Negative /HPF        IMAGING:  < from: [facility] Duplex Lower Ext Vein Scan, Bilat (01.10.24 @ 06:35) >    ACC: [id number] EXAM:  DUPLEX SCAN EXT VEINS LOWER BI   ORDERED BY:    FAITH POLANCO     PROCEDURE DATE:  01/10/2024          INTERPRETATION:  CLINICAL INFORMATION: Lower extremity edema, rule out   DVT.    COMPARISON: 2023.    TECHNIQUE: Duplex sonography of the BILATERAL LOWER extremity veins with   color and spectral Doppler, with and without compression.    FINDINGS:    RIGHT:  Normal compressibility of the RIGHT common femoral, femoral and popliteal   veins.  Doppler examination shows normal spontaneous and phasic flow.  Thrombus in the right soleal vein and right peroneal vein.  Groin lymph nodes, largest measuring 1.9 x 0.8 x 0.8 cm.    LEFT:  Normal compressibility of the LEFT common femoral, femoral and popliteal   veins.  Doppler examination shows normal spontaneous and phasic flow.  Thrombus in the left soleal vein.  Groin lymph nodes, largest measuring 2.4 x 0.8 x 1.9 cm.    IMPRESSION:  Acute deep venous thrombosis: below the knee.    Thrombus in the bilateral soleal veins and right peroneal vein.    Findings were discussed with Dr. Trevizo 1/10/2024 6:55 AM by Dr. Heard with   read back confirmation.    --- End of Report ---             GETACHEW HEARD MD; Attending Radiologist  This document has been electronically signed. Gerardo 10 2024  6:55AM    < end of copied text >                                                                                               Vascular Surgery Consult  Consulting attending: Dr. 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 Vascular surgery consulted for bilateral below knee DVTs on bilateral lower extremity venous duplex. He has a history of LLE compartment syndrome 2/2 DVTs in 2023. This required four compartment fasciotomy of LLE (Dr. Smith), subsequent muscle debridement in OR, and closure of fasciotomy wounds by plastic surgery. He currently only complains of neuropathy in the soles of his feet. Otherwise, no motor or sensory complaints. Denies symptoms of claudication and rest pain. Ambulates with walker. Has been compliant with AC which is managed by hematology as an outpatient.      PAST MEDICAL HISTORY:  History of TIAs    CVA (cerebrovascular accident)    HLD (hyperlipidemia)    Vertigo    Unresponsiveness        PAST SURGICAL HISTORY:  No significant past surgical history    History of fasciotomy        MEDICATIONS:  aspirin enteric coated 81 milliGRAM(s) Oral daily  atorvastatin 20 milliGRAM(s) Oral at bedtime  cefTRIAXone   IVPB 1000 milliGRAM(s) IV Intermittent every 24 hours  gabapentin 300 milliGRAM(s) Oral two times a day  lacosamide 150 milliGRAM(s) Oral two times a day  multivitamin 1 Tablet(s) Oral daily  pantoprazole    Tablet 40 milliGRAM(s) Oral before breakfast  sodium chloride 0.9%. 1000 milliLiter(s) IV Continuous <Continuous>  tamsulosin 0.4 milliGRAM(s) Oral at bedtime      ALLERGIES:  No Known Allergies      VITALS & I/Os:  Vital Signs Last 24 Hrs  T(C): 36.8 (10 Gerardo 2024 17:30), Max: 36.8 (10 Gerardo 2024 17:30)  T(F): 98.2 (10 Gerardo 2024 17:30), Max: 98.2 (10 Gerardo 2024 17:30)  HR: 83 (10 Gerardo 2024 17:30) (66 - 83)  BP: 119/78 (10 Gerardo 2024 17:30) (111/70 - 146/98)  BP(mean): --  RR: 18 (10 Gerardo 2024 17:30) (16 - 18)  SpO2: 97% (10 Gerardo 2024 17:30) (97% - 100%)    Parameters below as of 10 Gerardo 2024 17:30  Patient On (Oxygen Delivery Method): room air        I&O's Summary      PHYSICAL EXAM:  Gen: NAD  CV: Regular rate  Resp: Nonlabored breathing on room air  Ext:  - Bilateral lower extremities are warm and well perfused  - Well healed LLE fasciotomy sites  - Bilateral lower extremity compartments are compressible  - Bilateral lower extremities without evidence of vascular compromise/phlegmasia  Vasc: Bilateral DP and PT pulses are palpable        LABS:                        13.1   11.51 )-----------( 202      ( 10 Gerardo 2024 17:29 )             39.3     01-10    138  |  101  |  15  ----------------------------<  215<H>  3.7   |  22  |  0.96    Ca    9.2      10 Gerardo 2024 07:38    TPro  8.1  /  Alb  4.7  /  TBili  0.3  /  DBili  x   /  AST  23  /  ALT  27  /  AlkPhos  78  0110    Lactate:    PT/INR - ( 10 Gerardo 2024 00:31 )   PT: 11.2 sec;   INR: 1.02 ratio         PTT - ( 10 Gerardo 2024 00:31 )  PTT:37.0 sec          Urinalysis Basic - ( 10 Gerardo 2024 07:38 )    Color: Red / Appearance: Clear / S.008 / pH: x  Gluc: 215 mg/dL / Ketone: Negative mg/dL  / Bili: Small / Urobili: 0.2 mg/dL   Blood: x / Protein: 300 mg/dL / Nitrite: Positive   Leuk Esterase: Moderate / RBC: 487 /HPF / WBC 9 /HPF   Sq Epi: x / Non Sq Epi: 1 /HPF / Bacteria: Negative /HPF        IMAGING:  < from: [facility] Duplex Lower Ext Vein Scan, Bilat (01.10.24 @ 06:35) >    ACC: [id number] EXAM:  DUPLEX SCAN EXT VEINS LOWER BI   ORDERED BY:    FAITH POLANCO     PROCEDURE DATE:  01/10/2024          INTERPRETATION:  CLINICAL INFORMATION: Lower extremity edema, rule out   DVT.    COMPARISON: 2023.    TECHNIQUE: Duplex sonography of the BILATERAL LOWER extremity veins with   color and spectral Doppler, with and without compression.    FINDINGS:    RIGHT:  Normal compressibility of the RIGHT common femoral, femoral and popliteal   veins.  Doppler examination shows normal spontaneous and phasic flow.  Thrombus in the right soleal vein and right peroneal vein.  Groin lymph nodes, largest measuring 1.9 x 0.8 x 0.8 cm.    LEFT:  Normal compressibility of the LEFT common femoral, femoral and popliteal   veins.  Doppler examination shows normal spontaneous and phasic flow.  Thrombus in the left soleal vein.  Groin lymph nodes, largest measuring 2.4 x 0.8 x 1.9 cm.    IMPRESSION:  Acute deep venous thrombosis: below the knee.    Thrombus in the bilateral soleal veins and right peroneal vein.    Findings were discussed with Dr. Trevizo 1/10/2024 6:55 AM by Dr. Heard with   read back confirmation.    --- End of Report ---             GETACHEW HEARD MD; Attending Radiologist  This document has been electronically signed. Gerardo 10 2024  6:55AM    < end of copied text >

## 2024-01-10 NOTE — H&P ADULT - HISTORY OF PRESENT ILLNESS
Patient is a 53 year old male with a PMHx CVA with residual word finding difficulty, bilateral DVTs on chronic Lovenox SQ (150Qd), compartment syndrome status post left fasciotomy, HLD, PFO, vertigo, seizure disorder who presents to [facility] with a chief complaint of 4-5 days of gross hematuria of dark red color with occasional passage of clots associated with suprapubic and left sided flank pain. Patient reports that when his hematuria began, he attributed it to increase intake of cranberry juice. Patient states that when his hematuria did not subside, he presented to his outpatient Urologist's office for which he was started on Cipro for UTI and only took one dose. Patient also endorses left flank pain. Patient denies history of nephrolithiasis or previous episodes of hematuria. Patient denies dysuria, frequency or urgency, chills, body aches, nausea, vomiting, headache, lightheadedness or dizziness.               Patient is a 53 year old male with a PMHx CVA with residual word finding difficulty, bilateral DVTs on chronic Lovenox SQ (150Qd), compartment syndrome status post left fasciotomy, HLD, PFO, vertigo, seizure disorder who presents to [facility] with a chief complaint of 4-5 days of gross hematuria of dark red color with occasional passage of clots associated with suprapubic and left sided flank pain. Patient reports that when his hematuria began, he attributed it to increase intake of cranberry juice. Patient states that when his hematuria did not subside, he presented to his outpatient Urologist's office for which he was started on Cipro for UTI and only took one dose. Patient also endorses left flank pain. Patient denies history of nephrolithiasis or previous episodes of hematuria. Patient denies dysuria, frequency or urgency, chills, body aches, nausea, vomiting, headache, lightheadedness or dizziness.

## 2024-01-10 NOTE — CONSULT NOTE ADULT - ASSESSMENT
53-year-old male with prior strokes with residual word finding difficulty, 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  Saw his urologist  was started on an antibioti. Patient also reports several episodes of severe intermittent left flank pain today, denies history of kidney stones to his knowledge.  Denies associated nausea, vomiting, diarrhea, dysuria, fever, chill    Impression:   1) chronic strokes, resid WFD  2) seizure d/o 2/2 storkes  3) ADHD    - for ADHD gets Vyvanse 50mg daily  - for seizure he is on vimpat 100mg BID and keppra 250mg BID. coming off keppra and raising vimpat   - for secondary stroke prevention he is on asa 81mg daily and full dose lovenox .  - statin therapy with LDL goal < 70mg/dL  - f/u  for flank pain, hematuria and obstruction   - abx for UTI   - PT/OT   - check FS, glucose control <180  - GI/DVT ppx  - Counseling on diet, exercise, and medication adherence was done  - Counseling on smoking cessation and alcohol consumption offered when appropriate.  - Pain assessed and judicious use of narcotics when appropriate was discussed.    - Stroke education given when appropriate.  - Importance of fall prevention discussed.   - Differential diagnosis and plan of care discussed with patient and/or family and primary team  - Thank you for allowing me to participate in the care of this patient. Call with questions.   Braxton Forde MD  Vascular Neurology  Office: [phone]  53-year-old male with prior strokes with residual word finding difficulty, 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  Saw his urologist  was started on an antibioti. Patient also reports several episodes of severe intermittent left flank pain today, denies history of kidney stones to his knowledge.  Denies associated nausea, vomiting, diarrhea, dysuria, fever, chill    Impression:   1) chronic strokes, resid WFD  2) seizure d/o 2/2 storkes  3) ADHD    - for ADHD gets Vyvanse 50mg daily  - for seizure he is on vimpat 100mg BID and keppra 250mg BID. coming off keppra and raising vimpat   - for secondary stroke prevention he is on asa 81mg daily and full dose lovenox .  - statin therapy with LDL goal < 70mg/dL  - f/u  for flank pain, hematuria and obstruction   - abx for UTI   - PT/OT   - check FS, glucose control <180  - GI/DVT ppx  - Counseling on diet, exercise, and medication adherence was done  - Counseling on smoking cessation and alcohol consumption offered when appropriate.  - Pain assessed and judicious use of narcotics when appropriate was discussed.    - Stroke education given when appropriate.  - Importance of fall prevention discussed.   - Differential diagnosis and plan of care discussed with patient and/or family and primary team  - Thank you for allowing me to participate in the care of this patient. Call with questions.   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  Saw his urologist  was started on an antibioti. Patient also reports several episodes of severe intermittent left flank pain today, denies history of kidney stones to his knowledge.  Denies associated nausea, vomiting, diarrhea, dysuria, fever, chill    Impression:   1) chronic strokes, resid WFD  2) seizure d/o 2/2 storkes  3) ADHD  4) vertigo BPPV    - for ADHD gets Vyvanse 50mg daily  - for seizure he is on vimpat should now be 150mg BID  and was taken off keppra   - for secondary stroke prevention he is on asa 81mg daily and full dose lovenox .  - statin therapy with LDL goal < 70mg/dL; atorvastatin 20 at home   - f/u  for flank pain, hematuria and obstruction   - abx for UTI   - PT/OT   - meclizion prn for vertigo   - check FS, glucose control <180  - GI/DVT ppx  - Counseling on diet, exercise, and medication adherence was done  - Counseling on smoking cessation and alcohol consumption offered when appropriate.  - Pain assessed and judicious use of narcotics when appropriate was discussed.    - Stroke education given when appropriate.  - Importance of fall prevention discussed.   - Differential diagnosis and plan of care discussed with patient and/or family and primary team  - Thank you for allowing me to participate in the care of this patient. Call with questions.   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  Saw his urologist  was started on an antibioti. Patient also reports several episodes of severe intermittent left flank pain today, denies history of kidney stones to his knowledge.  Denies associated nausea, vomiting, diarrhea, dysuria, fever, chill    Impression:   1) chronic strokes, resid WFD  2) seizure d/o 2/2 storkes  3) ADHD  4) vertigo BPPV    - for ADHD gets Vyvanse 50mg daily  - for seizure he is on vimpat should now be 150mg BID  and was taken off keppra   - for secondary stroke prevention he is on asa 81mg daily and full dose lovenox .  - statin therapy with LDL goal < 70mg/dL; atorvastatin 20 at home   - f/u  for flank pain, hematuria and obstruction   - abx for UTI   - PT/OT   - meclizion prn for vertigo   - check FS, glucose control <180  - GI/DVT ppx  - Counseling on diet, exercise, and medication adherence was done  - Counseling on smoking cessation and alcohol consumption offered when appropriate.  - Pain assessed and judicious use of narcotics when appropriate was discussed.    - Stroke education given when appropriate.  - Importance of fall prevention discussed.   - Differential diagnosis and plan of care discussed with patient and/or family and primary team  - Thank you for allowing me to participate in the care of this patient. Call with questions.   known to me from office   Braxton Forde MD  Vascular Neurology  Office: [phone]

## 2024-01-10 NOTE — ED ADULT NURSE REASSESSMENT NOTE - NS ED NURSE REASSESS COMMENT FT1
Received report from Jodi FLORES. Pt awake and alert, resting comfortably in stretcher. Denies CP, SOB, dizziness. Awaiting bed assignment.

## 2024-01-10 NOTE — H&P ADULT - NSHPREVIEWOFSYSTEMS_GEN_ALL_CORE
REVIEW OF SYSTEMS:    CONSTITUTIONAL: Generalized weakness; Residual word finding difficulty   EYES/ENT: No visual changes;  No vertigo or throat pain   NECK: No pain or stiffness  RESPIRATORY: No cough, wheezing, hemoptysis; No shortness of breath  CARDIOVASCULAR: No chest pain or palpitations  GASTROINTESTINAL: No abdominal or epigastric pain. No nausea, vomiting, or hematemesis; No diarrhea or constipation. No melena or hematochezia.  GENITOURINARY:  + 4-5 days of hematuria with occasional passage of clots; No dysuria, frequency or hematuria  NEUROLOGICAL: + H/O CVA; Residual word finding difficulty   SKIN: + S/P L Fasciotomy; LE skin changes; + Upper thigh ecchymosis at Lovenox inj site; Denies itching   All other review of systems is negative unless indicated above.

## 2024-01-10 NOTE — PHYSICAL THERAPY INITIAL EVALUATION ADULT - PLANNED THERAPY INTERVENTIONS, PT EVAL
GOAL: Pt will negotiate up/down 8 steps +HR  independently in 2 weeks/balance training/gait training/strengthening

## 2024-01-10 NOTE — H&P ADULT - ASSESSMENT
Hematuria   - Pt reports last dose of Lovenox injection was 11/09 AM prior to arrival   - Hgb stable, however with gross hematuria and hemorrhage seen on CT   - Hold Lovenox for now.  - Check CBC BID   - Urology evaluation appreciated; F/u recs    UTI   - With associated flank pain. UA grossly positive. 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 Patient reports that when his hematuria began, he attributed it to increase intake of cranberry juice. Patient states that when his hematuria did not subside, he presented to his outpatient Urologist's office for which he was started on Cipro for UTI and only took one dose. Patient also endorses left flank pain. Patient denies history of nephrolithiasis or previous episodes of hematuria. Patient denies dysuria, frequency or urgency, chills, body aches, nausea, vomiting, headache, lightheadedness or dizziness.       Hematuria   - Pt reports last dose of Lovenox injection was 11/09 AM prior to arrival   - Hgb stable, however with gross hematuria and hemorrhage seen on CT   - Hold Lovenox for now   - Check CBC BID - Monitor H/H and Hgb closely   - Urology evaluation appreciated; F/u recs    UTI   - With associated flank pain. UA grossly positive. Started on Rocephin 2g Q24 hours for now  - F/u UCx --> In lab   - IVF for hydration   - C/w Rocephin for now pending UCx results and sensitivities   - Monitor CBC, Temp Curve, VS and patient closely     Hydronephrosis   - CT w/   - C/w Tamsulosin; Monitor I and O   - As per Urology, patient will require close outpatient follow up and work up for UPJ obstruction   - Urology eval appreciated; F/u recs    DVT  - LE Duplex w/ DVT   - On Lovenox for  Mg BID     Seizure   - Per history   - Have asked pharmacy to obtain Med Rec for home medications   - Resume home Vimpat 150 BID as per Neuro.  - Per Neurology, patient is no longer on Keppra. Will DC   - Seizure precautions  - Neuro eval appreciated; F/u recs    CVA  - Per History   - On ASA, Lovenox and Statin as home   - ASA and Lovenox on hold for now in view of hematuria. Resume as able to   - C/w Statin therapy   - Neuro eval appreciated     ADHD  - Vyvanse 50 Mg not available in patient. Resume on DC  - Fall, Seizure, Aspiration precautions  - Neuro eval appreciated; F/u recs     LLE Fasciotomy   - 2/2 compartment syndrome.   - Wound care eval     HLD  - C/w Lipitor 20  - Check Lipid panel     Vertigo  - Per History. No longer on Meclizine per patient  - Fall, Seizure and Aspiration precautions   - Ambulate with Assistance  - PT eval     PPX  - Resume Lovenox as able to   - PPI         Discussed with ACP.      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 Patient reports that when his hematuria began, he attributed it to increase intake of cranberry juice. Patient states that when his hematuria did not subside, he presented to his outpatient Urologist's office for which he was started on Cipro for UTI and only took one dose. Patient also endorses left flank pain. Patient denies history of nephrolithiasis or previous episodes of hematuria. Patient denies dysuria, frequency or urgency, chills, body aches, nausea, vomiting, headache, lightheadedness or dizziness.       Hematuria   - Pt reports last dose of Lovenox injection was 11/09 AM prior to arrival   - Hgb stable, however with gross hematuria and hemorrhage seen on CT   - Hold Lovenox for now   - Check CBC BID - Monitor H/H and Hgb closely   - Urology evaluation appreciated; F/u recs    UTI   - With associated flank pain. UA grossly positive. Started on Rocephin 2g Q24 hours for now  - F/u UCx --> In lab   - IVF for hydration   - C/w Rocephin for now pending UCx results and sensitivities   - Monitor CBC, Temp Curve, VS and patient closely     Hydronephrosis   - CT w/   - C/w Tamsulosin; Monitor I and O   - As per Urology, patient will require close outpatient follow up and work up for UPJ obstruction   - Urology eval appreciated; F/u recs    DVT  - LE Duplex w/ DVT   - On Lovenox for  Mg BID     Seizure   - Per history   - Have asked pharmacy to obtain Med Rec for home medications   - Resume home Vimpat 150 BID as per Neuro.  - Per Neurology, patient is no longer on Keppra. Will DC   - Seizure precautions  - Neuro eval appreciated; F/u recs    CVA  - Per History   - On ASA, Lovenox and Statin as home   - ASA and Lovenox on hold for now in view of hematuria. Resume as able to   - C/w Statin therapy   - Neuro eval appreciated     ADHD  - Vyvanse 50 Mg not available in patient. Resume on DC  - Fall, Seizure, Aspiration precautions  - Neuro eval appreciated; F/u recs     LLE Fasciotomy   - 2/2 compartment syndrome.   - Wound care eval     HLD  - C/w Lipitor 20  - Check Lipid panel     Vertigo  - Per History. No longer on Meclizine per patient  - Fall, Seizure and Aspiration precautions   - Ambulate with Assistance  - PT eval     PPX  - Resume Lovenox as able to   - PPI         Discussed with ACP.      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 Patient reports that when his hematuria began, he attributed it to increase intake of cranberry juice. Patient states that when his hematuria did not subside, he presented to his outpatient Urologist's office for which he was started on Cipro for UTI and only took one dose. Patient also endorses left flank pain. Patient denies history of nephrolithiasis or previous episodes of hematuria. Patient denies dysuria, frequency or urgency, chills, body aches, nausea, vomiting, headache, lightheadedness or dizziness.       Hematuria   - Pt reports last dose of Lovenox injection was 11/09 AM prior to arrival   - Hgb stable, however with gross hematuria and hemorrhage seen on CT   - Hold Lovenox for now   - Check CBC BID - Monitor H/H and Hgb closely   - Urology evaluation appreciated; F/u recs    UTI   - With associated flank pain. UA grossly positive. Started on Rocephin 2g Q24 hours for now  - F/u UCx --> In lab   - IVF for hydration   - C/w Rocephin for now pending UCx results and sensitivities   - Monitor CBC, Temp Curve, VS and patient closely     Hydronephrosis   - CT w/ mild L hydronephrosis with abrupt tapering of the dilated renal collecting system at the level of the UPJ, Hyperdense material within the dilated L upper renal pole most likely representing hemorrhage, Moderate L Perinephric/ periureteric stranding, no calculi identified   - C/w Tamsulosin; Monitor I and O   - As per Urology, patient will require close outpatient follow up and work up for UPJ obstruction   - Urology eval appreciated; F/u recs    Hemorrhage   - Seen on CT as above  - Check serial CBC   - Monitor H/H closely. Transfuse for Hgb < 7.0   - Urology eval appreciated; F/u recs    DVT  - LE Duplex w/ B/L soleal vein and right peroneal vein thrombus, Acute DVT below the knee    - On Lovenox for  Mg BID   - AC on hold in view of hematuria. Resume as able to     Seizure   - Per history   - Have asked pharmacy to obtain Med Rec for home medications   - Resume home Vimpat 150 BID as per Neuro.  - Per Neurology, patient is no longer on Keppra. Will DC   - Seizure precautions  - Neuro eval appreciated; F/u recs    CVA  - Per History   - On ASA, Lovenox and Statin as home   - ASA and Lovenox on hold for now in view of hematuria. Resume as able to   - C/w Statin therapy   - Neuro eval appreciated     ADHD  - Vyvanse 50 Mg not available in patient. Resume on DC  - Fall, Seizure, Aspiration precautions  - Neuro eval appreciated; F/u recs     LLE Fasciotomy   - 2/2 compartment syndrome.   - Wound care eval     HLD  - C/w Lipitor 20  - Check Lipid panel     Vertigo  - Per History. No longer on Meclizine per patient  - Fall, Seizure and Aspiration precautions   - Ambulate with Assistance  - PT eval     PPX  - Resume Lovenox as able to   - PPI         Discussed with ACP.      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 Patient reports that when his hematuria began, he attributed it to increase intake of cranberry juice. Patient states that when his hematuria did not subside, he presented to his outpatient Urologist's office for which he was started on Cipro for UTI and only took one dose. Patient also endorses left flank pain. Patient denies history of nephrolithiasis or previous episodes of hematuria. Patient denies dysuria, frequency or urgency, chills, body aches, nausea, vomiting, headache, lightheadedness or dizziness.       Hematuria   - Pt reports last dose of Lovenox injection was 11/09 AM prior to arrival   - Hgb stable, however with gross hematuria and hemorrhage seen on CT   - Hold Lovenox for now   - Check CBC BID - Monitor H/H and Hgb closely   - Urology evaluation appreciated; F/u recs    UTI   - With associated flank pain. UA grossly positive. Started on Rocephin 2g Q24 hours for now  - F/u UCx --> In lab   - IVF for hydration   - C/w Rocephin for now pending UCx results and sensitivities   - Monitor CBC, Temp Curve, VS and patient closely     Hydronephrosis   - CT w/ mild L hydronephrosis with abrupt tapering of the dilated renal collecting system at the level of the UPJ, Hyperdense material within the dilated L upper renal pole most likely representing hemorrhage, Moderate L Perinephric/ periureteric stranding, no calculi identified   - C/w Tamsulosin; Monitor I and O   - As per Urology, patient will require close outpatient follow up and work up for UPJ obstruction   - Urology eval appreciated; F/u recs    Hemorrhage   - Seen on CT as above  - Check serial CBC   - Monitor H/H closely. Transfuse for Hgb < 7.0   - Urology eval appreciated; F/u recs    DVT  - LE Duplex w/ B/L soleal vein and right peroneal vein thrombus, Acute DVT below the knee    - On Lovenox for  Mg BID   - AC on hold in view of hematuria. Resume as able to     Seizure   - Per history   - Have asked pharmacy to obtain Med Rec for home medications   - Resume home Vimpat 150 BID as per Neuro.  - Per Neurology, patient is no longer on Keppra. Will DC   - Seizure precautions  - Neuro eval appreciated; F/u recs    CVA  - Per History   - On ASA, Lovenox and Statin as home   - ASA and Lovenox on hold for now in view of hematuria. Resume as able to   - C/w Statin therapy   - Neuro eval appreciated     ADHD  - Vyvanse 50 Mg not available in patient. Resume on DC  - Fall, Seizure, Aspiration precautions  - Neuro eval appreciated; F/u recs     LLE Fasciotomy   - 2/2 compartment syndrome.   - Wound care eval     HLD  - C/w Lipitor 20  - Check Lipid panel     Vertigo  - Per History. No longer on Meclizine per patient  - Fall, Seizure and Aspiration precautions   - Ambulate with Assistance  - PT eval     PPX  - Resume Lovenox as able to   - PPI         Discussed with ACP.      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 Patient reports that when his hematuria began, he attributed it to increase intake of cranberry juice. Patient states that when his hematuria did not subside, he presented to his outpatient Urologist's office for which he was started on Cipro for UTI and only took one dose. Patient also endorses left flank pain. Patient denies history of nephrolithiasis or previous episodes of hematuria. Patient denies dysuria, frequency or urgency, chills, body aches, nausea, vomiting, headache, lightheadedness or dizziness.       Hematuria   - Pt reports last dose of Lovenox injection was 11/09 AM prior to arrival   - Hgb stable, however with gross hematuria and hemorrhage seen on CT   - Hold Lovenox for now --> As per discussion with Urology, awaiting repeat CBC at 16:00 for possible resumption   - Check CBC BID - Monitor H/H and Hgb closely   - Urology evaluation appreciated; F/u recs    UTI   - With associated flank pain. UA grossly positive. Started on Rocephin 2g Q24 hours for now  - F/u UCx --> In lab   - IVF for hydration   - C/w Rocephin for now pending UCx results and sensitivities   - Monitor CBC, Temp Curve, VS and patient closely     Hydronephrosis   - CT w/ mild L hydronephrosis with abrupt tapering of the dilated renal collecting system at the level of the UPJ, Hyperdense material within the dilated L upper renal pole most likely representing hemorrhage, Moderate L Perinephric/ periureteric stranding, no calculi identified   - C/w Tamsulosin; Monitor I and O   - As per Urology, patient will require close outpatient follow up and work up for UPJ obstruction   - Urology eval appreciated; F/u recs    Hemorrhage   - Seen on CT as above  - Check serial CBC BID   - Monitor H/H closely. Transfuse for Hgb < 7.0   - Urology eval appreciated; F/u recs    DVT  - LE Duplex w/ B/L soleal vein and right peroneal vein thrombus, Acute DVT below the knee    - On Lovenox for  Mg BID   - AC on hold in view of hematuria. Resume if Hgb stable  - Vascular Cardiology and Hematology evaluations consulted     Seizure   - Per history   - Have asked pharmacy to obtain Med Rec for home medications   - Resume home Vimpat 150 BID as per Neuro.  - Per Neurology, patient is no longer on Keppra. Will DC   - Seizure precautions  - Neuro eval appreciated; F/u recs    CVA  - Per History   - On ASA, Lovenox and Statin as home --> C/w ASA and Statin   - Lovenox on hold for now in view of hematuria. Resume if Hgb stable   - C/w Statin therapy   - Neuro eval appreciated     ADHD  - Vyvanse 50 Mg not available in patient. Resume on DC  - Fall, Seizure, Aspiration precautions  - Neuro eval appreciated; F/u recs     LLE Fasciotomy   - 2/2 compartment syndrome.   - Wound care eval     HLD  - C/w Lipitor 20  - Check Lipid panel     Vertigo  - Per History. No longer on Meclizine per patient  - Fall, Seizure and Aspiration precautions   - Ambulate with Assistance  - PT eval     PPX  - Resume Lovenox as able to   - PPI         Discussed with Attending and ACP.      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 Patient reports that when his hematuria began, he attributed it to increase intake of cranberry juice. Patient states that when his hematuria did not subside, he presented to his outpatient Urologist's office for which he was started on Cipro for UTI and only took one dose. Patient also endorses left flank pain. Patient denies history of nephrolithiasis or previous episodes of hematuria. Patient denies dysuria, frequency or urgency, chills, body aches, nausea, vomiting, headache, lightheadedness or dizziness.       Hematuria   - Pt reports last dose of Lovenox injection was 11/09 AM prior to arrival   - Hgb stable, however with gross hematuria and hemorrhage seen on CT   - Hold Lovenox for now --> As per discussion with Urology, awaiting repeat CBC at 16:00 for possible resumption   - Check CBC BID - Monitor H/H and Hgb closely   - Urology evaluation appreciated; F/u recs    UTI   - With associated flank pain. UA grossly positive. Started on Rocephin 2g Q24 hours for now  - F/u UCx --> In lab   - IVF for hydration   - C/w Rocephin for now pending UCx results and sensitivities   - Monitor CBC, Temp Curve, VS and patient closely     Hydronephrosis   - CT w/ mild L hydronephrosis with abrupt tapering of the dilated renal collecting system at the level of the UPJ, Hyperdense material within the dilated L upper renal pole most likely representing hemorrhage, Moderate L Perinephric/ periureteric stranding, no calculi identified   - C/w Tamsulosin; Monitor I and O   - As per Urology, patient will require close outpatient follow up and work up for UPJ obstruction   - Urology eval appreciated; F/u recs    Hemorrhage   - Seen on CT as above  - Check serial CBC BID   - Monitor H/H closely. Transfuse for Hgb < 7.0   - Urology eval appreciated; F/u recs    DVT  - LE Duplex w/ B/L soleal vein and right peroneal vein thrombus, Acute DVT below the knee    - On Lovenox for  Mg BID   - AC on hold in view of hematuria. Resume if Hgb stable  - Vascular Cardiology and Hematology evaluations consulted     Seizure   - Per history   - Have asked pharmacy to obtain Med Rec for home medications   - Resume home Vimpat 150 BID as per Neuro.  - Per Neurology, patient is no longer on Keppra. Will DC   - Seizure precautions  - Neuro eval appreciated; F/u recs    CVA  - Per History   - On ASA, Lovenox and Statin as home --> C/w ASA and Statin   - Lovenox on hold for now in view of hematuria. Resume if Hgb stable   - C/w Statin therapy   - Neuro eval appreciated     ADHD  - Vyvanse 50 Mg not available in patient. Resume on DC  - Fall, Seizure, Aspiration precautions  - Neuro eval appreciated; F/u recs     LLE Fasciotomy   - 2/2 compartment syndrome.   - Wound care eval     HLD  - C/w Lipitor 20  - Check Lipid panel     Vertigo  - Per History. No longer on Meclizine per patient  - Fall, Seizure and Aspiration precautions   - Ambulate with Assistance  - PT eval     PPX  - Resume Lovenox as able to   - PPI         Discussed with Attending and ACP.

## 2024-01-10 NOTE — PHYSICAL THERAPY INITIAL EVALUATION ADULT - PERTINENT HX OF CURRENT PROBLEM, REHAB EVAL
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 Patient also endorses left flank pain. Patient denies history of nephrolithiasis or previous episodes of hematuria. Patient denies dysuria, frequency or urgency, chills, body aches, nausea, vomiting, headache, lightheadedness or dizziness.   CT Abdomen & Pelvis (1/10): Mild left hydronephrosis with abrupt tapering at the ureteropelvic junction. There is evidence of hemorrhage into the dilated left upper renal pole collecting system and associated inflammatory changes throughout the perinephric fat. No radiodense calculi identified. Urology evaluation is advised. VA Duplex LE (1/10): Acute deep venous thrombosis: below the knee. Thrombus in the bilateral soleal veins and right peroneal vein. 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 Patient also endorses left flank pain. Patient denies history of nephrolithiasis or previous episodes of hematuria. Patient denies dysuria, frequency or urgency, chills, body aches, nausea, vomiting, headache, lightheadedness or dizziness.   CT Abdomen & Pelvis (1/10): Mild left hydronephrosis with abrupt tapering at the ureteropelvic junction. There is evidence of hemorrhage into the dilated left upper renal pole collecting system and associated inflammatory changes throughout the perinephric fat. No radiodense calculi identified. Urology evaluation is advised. VA Duplex LE (1/10): Acute deep venous thrombosis: below the knee. Thrombus in the bilateral soleal veins and right peroneal vein.

## 2024-01-10 NOTE — CONSULT NOTE ADULT - ASSESSMENT
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 CT abd pelvis showed mild L hydro to the level of the UPJ with associated blood products in the collecting system. Labs showed WBC 13.60, Hct 42.5, Cr 1.14. UA pending. PVR 55cc.    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 CT abd pelvis showed mild L hydro to the level of the UPJ with associated blood products in the collecting system. Labs showed WBC 13.60, Hct 42.5, Cr 1.14. UA pending. PVR 55cc.    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 ED on 1/9/2024 for gross hematuria and intermittent suprapubic and L flank pain. CT abd pelvis showed mild L hydro to the level of the UPJ with associated blood products in the collecting system. Labs showed WBC 13.60, Hct 42.5, Cr 1.14. UA pending. PVR 55cc.    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 CT abd pelvis showed mild L hydro to the level of the UPJ with associated blood products in the collecting system. Labs showed WBC 13.60, Hct 42.5, Cr 1.14. UA pending. PVR 55cc.    Recs:  -pending UA, pending urine color  -trend h/h  -f/u urine culture  -patient will ultimately need close outpatient follow up/work up for UPJ obstruction

## 2024-01-10 NOTE — ED ADULT NURSE NOTE - OBJECTIVE STATEMENT
53 year old male coming in for hematuria. Pt has hx of CVS uses a walker at baseline., residual word finding difficulty, DVT on cx Lovenox, HLD, SZR. PAtient is coming in for several days of gross hematuria. Patient states the urine is dark red blood with some clots. Patient does endorses at times the urine looks more clear but then is blood again. Patient states he felt like he wasn't emptying fully then will pass a clot and pee a large amount. Patient went top urologist ho started him on antibiotics. Patient endorses left flank pain intermittently denies at this time. No shortness of breath or difficulty breathing. No chest pain, pressure or palpitations. No abdominal pain, nausea or vomiting. No fever, chills, or body aches.

## 2024-01-10 NOTE — ED ADULT NURSE REASSESSMENT NOTE - NS ED NURSE REASSESS COMMENT FT1
Pt A&Ox3, is awake and alert, speaking in full sentences. Pt denies any pain at this time. Awaiting dispo.

## 2024-01-10 NOTE — PHYSICAL THERAPY INITIAL EVALUATION ADULT - ADDITIONAL COMMENTS
Pt lives alone in a basement apartment, 8 steps down to enter +[place]. Pt ambulates independently with RW. Independent ADLs

## 2024-01-10 NOTE — CONSULT NOTE ADULT - SUBJECTIVE AND OBJECTIVE BOX
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 Pt reports 3-4 day hx of hematuria, was seen by outpatient Urology earlier on 1/9, was prescribed cipro and flomax for suspected UTI. Reports taking 1 dose of prescribed cipro. Hematuria was not accompanied by any pain until 4-5 pm on 1/9 when pt started developing severe, sharp suprapubic pain that radiated to the left flank. Appears unassociated with urination. Reports +BM. Denies fevers, chills, nausea, vomiting, dizziness, chest pain, SOB, abdominal pain, dysuria. Of note, he endorses 30lb weight loss in the past few months. He denies night sweats and any smoking hx. He endorses +fhx of prostate ca.  In the ED, patient AVSS. CT abd pelvis showed mild L hydro to the level of the UPJ with associated blood products in the collecting system.  Labs showed WBC 13.60, Hct 42.5, Cr 1.14. UA pending.    PAST MEDICAL & SURGICAL HISTORY:  History of TIAs      CVA (cerebrovascular accident)      HLD (hyperlipidemia)      Vertigo      Unresponsiveness      History of fasciotomy        FAMILY HISTORY:  No pertinent family history in first degree relatives      SOCIAL HISTORY:   Tobacco hx:  MEDICATIONS  (STANDING):  cefTRIAXone   IVPB 1000 milliGRAM(s) IV Intermittent once    MEDICATIONS  (PRN):    Allergies    No Known Allergies    Intolerances        REVIEW OF SYSTEMS: Pertinent positives and negatives as stated in HPI, otherwise negative    Vital signs  T(C): 36.4 (01-10-24 @ 04:14), Max: 36.6 (01-09-24 @ 23:53)  HR: 80 (01-10-24 @ 04:14)  BP: 111/70 (01-10-24 @ 04:14)  SpO2: 97% (01-10-24 @ 04:14)  Wt(kg): --    Output      Physical Exam  Gen: NAD  Pulm: No respiratory distress, no subcostal retractions  Abd: Soft, NT, ND  : (-) CVAT b/l      LABS:        01-10 @ 00:31    WBC 13.60 / Hct 42.5  / SCr 1.14     01-10    138  |  100  |  19  ----------------------------<  145<H>  4.2   |  25  |  1.14    Ca    9.6      10 Gerardo 2024 00:31    TPro  8.1  /  Alb  4.7  /  TBili  0.3  /  DBili  x   /  AST  23  /  ALT  27  /  AlkPhos  78  01-10    PT/INR - ( 10 Gerardo 2024 00:31 )   PT: 11.2 sec;   INR: 1.02 ratio         PTT - ( 10 Gerardo 2024 00:31 )  PTT:37.0 sec  Urinalysis Basic - ( 10 Gerardo 2024 00:31 )    Color: x / Appearance: x / SG: x / pH: x  Gluc: 145 mg/dL / Ketone: x  / Bili: x / Urobili: x   Blood: x / Protein: x / Nitrite: x   Leuk Esterase: x / RBC: x / WBC x   Sq Epi: x / Non Sq Epi: x / Bacteria: x        Urine Cx: pending      Radiology:  ACC: [id number] EXAM:  CT ABDOMEN AND PELVIS   ORDERED BY: RAYSHAWN ROMAN     PROCEDURE DATE:  01/10/2024          INTERPRETATION:  CLINICAL INFORMATION: Hematuria. Left flank pain.    COMPARISON: None.    CONTRAST/COMPLICATIONS:  IV Contrast: NONE  Oral Contrast: NONE  Complications: None reported at time of study completion    PROCEDURE:  CT of the Abdomen and Pelvis was performed.  Sagittal and coronal reformats were performed.    FINDINGS:  LOWER CHEST: Within normal limits.    LIVER: Enlarged measuring up to 22 cm.  BILE DUCTS: Normal caliber.  GALLBLADDER: Within normal limits.  SPLEEN: Within normal limits.  PANCREAS: Within normal limits.  ADRENALS: Within normal limits.  KIDNEYS/URETERS: There is mild left hydronephrosis with abrupt tapering   of the dilated renal collecting system at the level of the ureteropelvic   junction. Hyperdense material within the dilated left upper renal pole   collecting system most likely represents hemorrhage. No radiodense renal   calculi identified. Moderate left perinephric/periureteric stranding   noted, possibly reactive/inflammatory/or infectious in nature. A   retroaortic left renal vein.    BLADDER: Within normal limits.  REPRODUCTIVE ORGANS: Prostate is enlarged.    BOWEL: No bowel obstruction. Appendix is normal.  PERITONEUM: No ascites.  VESSELS: Within normal limits.  RETROPERITONEUM/LYMPH NODES: No lymphadenopathy.  ABDOMINAL WALL: Within normal limits.  BONES: Within normal limits.    IMPRESSION:    Mild left hydronephrosis with abrupt tapering at the ureteropelvic   junction. There is evidence of hemorrhage into the dilated left upper   renal pole collecting system and associated inflammatory changes   throughout the perinephric fat. No radiodense calculi identified.Urology   evaluation is advised.        --- End of Report ---            LEXIS BANUELOS MD; Attending Radiologist  This document has been electronically signed. 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 Pt reports 3-4 day hx of hematuria, was seen by outpatient Urology earlier on 1/9, was prescribed cipro and flomax for suspected UTI. Reports taking 1 dose of prescribed cipro. Hematuria was not accompanied by any pain until 4-5 pm on 1/9 when pt started developing severe, sharp suprapubic pain that radiated to the left flank. Appears unassociated with urination. Reports +BM. Denies fevers, chills, nausea, vomiting, dizziness, chest pain, SOB, abdominal pain, dysuria. Of note, he endorses 30lb weight loss in the past few months. He denies night sweats and any smoking hx. He endorses +fhx of prostate ca.  In the ED, patient AVSS. CT abd pelvis showed mild L hydro to the level of the UPJ with associated blood products in the collecting system.  Labs showed WBC 13.60, Hct 42.5, Cr 1.14. UA pending.    PAST MEDICAL & SURGICAL HISTORY:  History of TIAs      CVA (cerebrovascular accident)      HLD (hyperlipidemia)      Vertigo      Unresponsiveness      History of fasciotomy        FAMILY HISTORY:  No pertinent family history in first degree relatives      SOCIAL HISTORY:   Tobacco hx:  MEDICATIONS  (STANDING):  cefTRIAXone   IVPB 1000 milliGRAM(s) IV Intermittent once    MEDICATIONS  (PRN):    Allergies    No Known Allergies    Intolerances        REVIEW OF SYSTEMS: Pertinent positives and negatives as stated in HPI, otherwise negative    Vital signs  T(C): 36.4 (01-10-24 @ 04:14), Max: 36.6 (01-09-24 @ 23:53)  HR: 80 (01-10-24 @ 04:14)  BP: 111/70 (01-10-24 @ 04:14)  SpO2: 97% (01-10-24 @ 04:14)  Wt(kg): --    Output      Physical Exam  Gen: NAD  Pulm: No respiratory distress, no subcostal retractions  Abd: Soft, NT, ND  : (-) CVAT b/l      LABS:        01-10 @ 00:31    WBC 13.60 / Hct 42.5  / SCr 1.14     01-10    138  |  100  |  19  ----------------------------<  145<H>  4.2   |  25  |  1.14    Ca    9.6      10 Gerardo 2024 00:31    TPro  8.1  /  Alb  4.7  /  TBili  0.3  /  DBili  x   /  AST  23  /  ALT  27  /  AlkPhos  78  01-10    PT/INR - ( 10 Gerardo 2024 00:31 )   PT: 11.2 sec;   INR: 1.02 ratio         PTT - ( 10 Gerardo 2024 00:31 )  PTT:37.0 sec  Urinalysis Basic - ( 10 Gerardo 2024 00:31 )    Color: x / Appearance: x / SG: x / pH: x  Gluc: 145 mg/dL / Ketone: x  / Bili: x / Urobili: x   Blood: x / Protein: x / Nitrite: x   Leuk Esterase: x / RBC: x / WBC x   Sq Epi: x / Non Sq Epi: x / Bacteria: x        Urine Cx: pending      Radiology:  ACC: [id number] EXAM:  CT ABDOMEN AND PELVIS   ORDERED BY: RAYSHAWN ROMAN     PROCEDURE DATE:  01/10/2024          INTERPRETATION:  CLINICAL INFORMATION: Hematuria. Left flank pain.    COMPARISON: None.    CONTRAST/COMPLICATIONS:  IV Contrast: NONE  Oral Contrast: NONE  Complications: None reported at time of study completion    PROCEDURE:  CT of the Abdomen and Pelvis was performed.  Sagittal and coronal reformats were performed.    FINDINGS:  LOWER CHEST: Within normal limits.    LIVER: Enlarged measuring up to 22 cm.  BILE DUCTS: Normal caliber.  GALLBLADDER: Within normal limits.  SPLEEN: Within normal limits.  PANCREAS: Within normal limits.  ADRENALS: Within normal limits.  KIDNEYS/URETERS: There is mild left hydronephrosis with abrupt tapering   of the dilated renal collecting system at the level of the ureteropelvic   junction. Hyperdense material within the dilated left upper renal pole   collecting system most likely represents hemorrhage. No radiodense renal   calculi identified. Moderate left perinephric/periureteric stranding   noted, possibly reactive/inflammatory/or infectious in nature. A   retroaortic left renal vein.    BLADDER: Within normal limits.  REPRODUCTIVE ORGANS: Prostate is enlarged.    BOWEL: No bowel obstruction. Appendix is normal.  PERITONEUM: No ascites.  VESSELS: Within normal limits.  RETROPERITONEUM/LYMPH NODES: No lymphadenopathy.  ABDOMINAL WALL: Within normal limits.  BONES: Within normal limits.    IMPRESSION:    Mild left hydronephrosis with abrupt tapering at the ureteropelvic   junction. There is evidence of hemorrhage into the dilated left upper   renal pole collecting system and associated inflammatory changes   throughout the perinephric fat. No radiodense calculi identified.Urology   evaluation is advised.        --- End of Report ---            LEXIS BANUELOS MD; Attending Radiologist  This document has been electronically signed. Gerardo 10 2024  2:42AM

## 2024-01-10 NOTE — CONSULT NOTE ADULT - NS ATTEND AMEND GEN_ALL_CORE FT
He was seen and examined, urine appears dark burgundy in color. Above studies reviewed with him. Agree with plan and monitor for now.

## 2024-01-10 NOTE — CONSULT NOTE ADULT - ATTENDING COMMENTS
53 year old man with acute thrombosis of bilateral soleal veins and right peroneal vein  pt hypercoagulable  DVTs occurred on therapeutic lovenox injections  also with severe hematuria  agree with holding anticoagulation and repeating interval duplex ultrasound next week  will follow

## 2024-01-10 NOTE — CONSULT NOTE ADULT - NS ATTEND AMEND GEN_ALL_CORE FT
Hold a/c for now  appreciate Vsx and hematology    eventually when we resume, consider lovenox BID dosing

## 2024-01-10 NOTE — CONSULT NOTE ADULT - SUBJECTIVE AND OBJECTIVE BOX
Vascular Cardiology Consult Note     DIRECT PROVIDER NUMBER: [phone]  / Available on TEAMS    CC: Hematuria     HPI: 54 y/o M with PMHX CVA, history of chronic bilateral lower extremity DVT on Lovenox 1.5 mg / kg daily (150 mg daily) at home, history compartment syndrome s/p left leg fasciotomy, vertigo, HLD, seizure disorder, who presented with hematuria. Patient denies C/P or SOB. Reports chronic mild left lower extremity edema and numbness / paresthesias in left lower extremity post-operatively. LE venous duplex showed thrombus in the bilateral soleal veins and right peroneal vein. Prior LE venous duplex in August 2023 also showed below the knee DVT. Patient hemodynamically stable on RA. Vascular Cardiology consulted.     Allergies  No Known Allergies	    MEDICATIONS:  aspirin enteric coated 81 milliGRAM(s) Oral daily  cefTRIAXone   IVPB 1000 milliGRAM(s) IV Intermittent every 24 hours  gabapentin 300 milliGRAM(s) Oral two times a day  lacosamide 150 milliGRAM(s) Oral two times a day  pantoprazole    Tablet 40 milliGRAM(s) Oral before breakfast  atorvastatin 20 milliGRAM(s) Oral at bedtime  multivitamin 1 Tablet(s) Oral daily  sodium chloride 0.9%. 1000 milliLiter(s) IV Continuous <Continuous>  tamsulosin 0.4 milliGRAM(s) Oral at bedtime    PAST MEDICAL & SURGICAL HISTORY:  History of TIAs  CVA (cerebrovascular accident)  HLD (hyperlipidemia)  Vertigo  Unresponsiveness  History of fasciotomy    FAMILY HISTORY:  No pertinent family history in first degree relatives    SOCIAL HISTORY:  unchanged    REVIEW OF SYSTEMS:  CONSTITUTIONAL: No fever  EYES: No eye pain  ENT:  No throat pain  NECK: No pain   RESPIRATORY: No SOB    CARDIOVASCULAR: No C/P  GASTROINTESTINAL: No abdominal pain  GENITOURINARY: Hematuria  NEUROLOGICAL: No memory loss  SKIN: Healed surgical scar to left leg  LYMPH Nodes: No enlarged glands noted  ENDOCRINE: No heat or cold intolerance noted  MUSCULOSKELETAL: Mild left lower extremity edema  PSYCHIATRIC: No depression, anxiety  HEME/LYMPH: No  bleeding gums  ALLERGY AND IMMUNOLOGIC: No hives    [ x] All others negative	    PHYSICAL EXAM:  T(C): 36.8 (01-10-24 @ 17:30), Max: 36.8 (01-10-24 @ 17:30)  HR: 83 (01-10-24 @ 17:30) (66 - 83)  BP: 119/78 (01-10-24 @ 17:30) (111/70 - 146/98)  RR: 18 (01-10-24 @ 17:30) (16 - 18)  SpO2: 97% (01-10-24 @ 17:30) (97% - 100%)  I&O's Summary    Appearance: NAD  	  HEENT:  NC/AT  Cardiovascular: RRR, S1 and S2  Respiratory: CTA B/L  Psychiatry:  AAO x 3  Gastrointestinal:  Soft, Non-tender, + BS	  Skin: No cyanosis	  Extremities: Mild left below the knee lower extremity edema  No right lower extremity edema  Vascular Pulse Exam: Palpable DP and DP bilaterally    LABS:	 	    CBC Full  -  ( 10 Gerardo 2024 07:38 )  WBC Count : 9.82 K/uL  Hemoglobin : 13.4 g/dL  Hematocrit : 42.0 %  Platelet Count - Automated : 200 K/uL  Mean Cell Volume : 92.5 fl  Mean Cell Hemoglobin : 29.5 pg  Mean Cell Hemoglobin Concentration : 31.9 gm/dL  Auto Neutrophil # : x  Auto Lymphocyte # : x  Auto Monocyte # : x  Auto Eosinophil # : x  Auto Basophil # : x  Auto Neutrophil % : x  Auto Lymphocyte % : x  Auto Monocyte % : x  Auto Eosinophil % : x  Auto Basophil % : x    01-10    138  |  101  |  15  ----------------------------<  215<H>  3.7   |  22  |  0.96  01-10    138  |  100  |  19  ----------------------------<  145<H>  4.2   |  25  |  1.14    Ca    9.2      10 Gerardo 2024 07:38  Ca    9.6      10 Gerardo 2024 00:31    TPro  8.1  /  Alb  4.7  /  TBili  0.3  /  DBili  x   /  AST  23  /  ALT  27  /  AlkPhos  78  01-10      Assessment:  1. Below the knee DVT      History of chronic DVT bilaterally  2. Hematuria  3. History of CVA  4. History compartment syndrome s/p left leg fasciotomy       Plan:  1. In the setting of below the knee DVT, in this hemodynamically stable patient with active hematuria, recommend surveillance with repeat LE venous duplex on 1/12 or 1/13.  2. Patient noted to be on therapeutic Lovenox at home, can hold at this time, and to be resumed later after several days, once okay with urology.   3. Appreciate care by primary team and subspecialty services.        Thank you  CLEMENTE Winslow, MS, [facility]  Vascular Cardiology Service    Please call with any questions:   DIRECT SERVICE NUMBER: [phone] / Available on TEAMS Vascular Cardiology Consult Note     DIRECT PROVIDER NUMBER: [phone]  / Available on TEAMS    CC: Hematuria     HPI: 54 y/o M with PMHX CVA, history of chronic bilateral lower extremity DVT on Lovenox 1.5 mg / kg daily (150 mg daily) at home, history compartment syndrome s/p left leg fasciotomy, vertigo, HLD, seizure disorder, who presented with hematuria. Patient denies C/P or SOB. Reports chronic mild left lower extremity edema and numbness / paresthesias in left lower extremity post-operatively. LE venous duplex showed thrombus in the bilateral soleal veins and right peroneal vein. Prior LE venous duplex in August 2023 also showed below the knee DVT. Patient hemodynamically stable on RA. Vascular Cardiology consulted.     Allergies  No Known Allergies	    MEDICATIONS:  aspirin enteric coated 81 milliGRAM(s) Oral daily  cefTRIAXone   IVPB 1000 milliGRAM(s) IV Intermittent every 24 hours  gabapentin 300 milliGRAM(s) Oral two times a day  lacosamide 150 milliGRAM(s) Oral two times a day  pantoprazole    Tablet 40 milliGRAM(s) Oral before breakfast  atorvastatin 20 milliGRAM(s) Oral at bedtime  multivitamin 1 Tablet(s) Oral daily  sodium chloride 0.9%. 1000 milliLiter(s) IV Continuous <Continuous>  tamsulosin 0.4 milliGRAM(s) Oral at bedtime    PAST MEDICAL & SURGICAL HISTORY:  History of TIAs  CVA (cerebrovascular accident)  HLD (hyperlipidemia)  Vertigo  Unresponsiveness  History of fasciotomy    FAMILY HISTORY:  No pertinent family history in first degree relatives    SOCIAL HISTORY:  unchanged    REVIEW OF SYSTEMS:  CONSTITUTIONAL: No fever  EYES: No eye pain  ENT:  No throat pain  NECK: No pain   RESPIRATORY: No SOB    CARDIOVASCULAR: No C/P  GASTROINTESTINAL: No abdominal pain  GENITOURINARY: Hematuria  NEUROLOGICAL: No memory loss  SKIN: Healed surgical scar to left leg  LYMPH Nodes: No enlarged glands noted  ENDOCRINE: No heat or cold intolerance noted  MUSCULOSKELETAL: Mild left lower extremity edema  PSYCHIATRIC: No depression, anxiety  HEME/LYMPH: No  bleeding gums  ALLERGY AND IMMUNOLOGIC: No hives    [ x] All others negative	    PHYSICAL EXAM:  T(C): 36.8 (01-10-24 @ 17:30), Max: 36.8 (01-10-24 @ 17:30)  HR: 83 (01-10-24 @ 17:30) (66 - 83)  BP: 119/78 (01-10-24 @ 17:30) (111/70 - 146/98)  RR: 18 (01-10-24 @ 17:30) (16 - 18)  SpO2: 97% (01-10-24 @ 17:30) (97% - 100%)  I&O's Summary    Appearance: NAD  	  HEENT:  NC/AT  Cardiovascular: RRR, S1 and S2  Respiratory: CTA B/L  Psychiatry:  AAO x 3  Gastrointestinal:  Soft, Non-tender, + BS	  Skin: No cyanosis	  Extremities: Mild left below the knee lower extremity edema  No right lower extremity edema  Vascular Pulse Exam: Palpable DP and DP bilaterally    LABS:	 	    CBC Full  -  ( 10 Gerardo 2024 07:38 )  WBC Count : 9.82 K/uL  Hemoglobin : 13.4 g/dL  Hematocrit : 42.0 %  Platelet Count - Automated : 200 K/uL  Mean Cell Volume : 92.5 fl  Mean Cell Hemoglobin : 29.5 pg  Mean Cell Hemoglobin Concentration : 31.9 gm/dL  Auto Neutrophil # : x  Auto Lymphocyte # : x  Auto Monocyte # : x  Auto Eosinophil # : x  Auto Basophil # : x  Auto Neutrophil % : x  Auto Lymphocyte % : x  Auto Monocyte % : x  Auto Eosinophil % : x  Auto Basophil % : x    01-10    138  |  101  |  15  ----------------------------<  215<H>  3.7   |  22  |  0.96  01-10    138  |  100  |  19  ----------------------------<  145<H>  4.2   |  25  |  1.14    Ca    9.2      10 Gerardo 2024 07:38  Ca    9.6      10 Gerardo 2024 00:31    TPro  8.1  /  Alb  4.7  /  TBili  0.3  /  DBili  x   /  AST  23  /  ALT  27  /  AlkPhos  78  01-10      Assessment:  1. Below the knee DVT      History of chronic DVT bilaterally  2. Hematuria  3. History of CVA  4. History compartment syndrome s/p left leg fasciotomy       Plan:  1. In the setting of below the knee DVT, in this hemodynamically stable patient with active hematuria, recommend surveillance with repeat LE venous duplex on 1/12 or 1/13.  2. Patient noted to be on therapeutic Lovenox at home, can hold at this time, and to be resumed later after several days, once okay with urology.   3. Appreciate care by primary team and subspecialty services.        Thank you  CLEMENTE Winslow, MS, [facility]  Vascular Cardiology Service    Please call with any questions:   DIRECT SERVICE NUMBER: [phone] / Available on TEAMS

## 2024-01-10 NOTE — H&P ADULT - NSHPPHYSICALEXAM_GEN_ALL_CORE
Vital Signs Last 24 Hrs  T(C): 36.3 (10 Gerardo 2024 07:45), Max: 36.6 (09 Jan 2024 23:53)  T(F): 97.4 (10 Gerardo 2024 07:45), Max: 97.8 (09 Jan 2024 23:53)  HR: 74 (10 Gerardo 2024 07:45) (66 - 80)  BP: 118/76 (10 Gerardo 2024 07:45) (111/70 - 146/98)  BP(mean): --  RR: 17 (10 Gerardo 2024 07:45) (16 - 18)  SpO2: 100% (10 Gerardo 2024 07:45) (97% - 100%)    Parameters below as of 10 Gerardo 2024 07:45  Patient On (Oxygen Delivery Method): room air        Appearance: Awake, lying down in bed, HOB elevated   HEENT:   Normal oral mucosa, Eyes are open 	  Lymphatic: No lymphadenopathy grossly   Cardiovascular: Normal S1 S2, No JVD, No murmurs    Respiratory: Lungs clear to auscultation, normal effort 	  Gastrointestinal:  Soft, Non-tender, + BS	  Skin: + B/L thigh ecchymosis at Lovenox injection sites   Musculoskeletal: + Prior L LE fasciotomy   Psychiatry:  Mood & affect appropriate  Ext: + Prior LLE Fasciotomy; Chronic skin changes Vital Signs Last 24 Hrs  T(C): 36.3 (10 Gerrado 2024 07:45), Max: 36.6 (09 Jan 2024 23:53)  T(F): 97.4 (10 Gerardo 2024 07:45), Max: 97.8 (09 Jan 2024 23:53)  HR: 74 (10 Gerardo 2024 07:45) (66 - 80)  BP: 118/76 (10 Gerardo 2024 07:45) (111/70 - 146/98)  BP(mean): --  RR: 17 (10 Gerardo 2024 07:45) (16 - 18)  SpO2: 100% (10 Gerardo 2024 07:45) (97% - 100%)    Parameters below as of 10 Gerardo 2024 07:45  Patient On (Oxygen Delivery Method): room air        Appearance: Awake, lying down in bed, HOB elevated   HEENT:   Normal oral mucosa, Eyes are open 	  Lymphatic: No lymphadenopathy grossly   Cardiovascular: Normal S1 S2, No JVD, No murmurs    Respiratory: Lungs clear to auscultation, normal effort 	  Gastrointestinal:  Soft, Non-tender, + BS	  Skin: + B/L thigh ecchymosis at Lovenox injection sites   Musculoskeletal: + Prior L LE fasciotomy   Psychiatry:  Mood & affect appropriate  Ext: + Prior LLE Fasciotomy; Chronic skin changes

## 2024-01-10 NOTE — H&P ADULT - NS ATTEND AMEND GEN_ALL_CORE FT
Pt care and plan discussed and reviewed with PA. Plan as outlined above edited by me to reflect our discussion. Advanced care planning/advanced directives discussed with patient/family. DNR status including forceful chest compressions to attempt to restart the heart, ventilator support/artificial breathing, electric shock, artificial nutrition, health care proxy, Molst form all discussed with pt. More than 50% of the visit was spent counseling and/or coordinating care by the attending physician.    DIscussed with patient and family over the phone

## 2024-01-11 LAB
ALBUMIN SERPL ELPH-MCNC: 3.9 G/DL — SIGNIFICANT CHANGE UP (ref 3.3–5)
ALBUMIN SERPL ELPH-MCNC: 3.9 G/DL — SIGNIFICANT CHANGE UP (ref 3.3–5)
ALP SERPL-CCNC: 66 U/L — SIGNIFICANT CHANGE UP (ref 40–120)
ALP SERPL-CCNC: 66 U/L — SIGNIFICANT CHANGE UP (ref 40–120)
ALT FLD-CCNC: 19 U/L — SIGNIFICANT CHANGE UP (ref 10–45)
ALT FLD-CCNC: 19 U/L — SIGNIFICANT CHANGE UP (ref 10–45)
ANION GAP SERPL CALC-SCNC: 10 MMOL/L — SIGNIFICANT CHANGE UP (ref 5–17)
ANION GAP SERPL CALC-SCNC: 10 MMOL/L — SIGNIFICANT CHANGE UP (ref 5–17)
AST SERPL-CCNC: 15 U/L — SIGNIFICANT CHANGE UP (ref 10–40)
AST SERPL-CCNC: 15 U/L — SIGNIFICANT CHANGE UP (ref 10–40)
BASOPHILS # BLD AUTO: 0.04 K/UL — SIGNIFICANT CHANGE UP (ref 0–0.2)
BASOPHILS # BLD AUTO: 0.04 K/UL — SIGNIFICANT CHANGE UP (ref 0–0.2)
BASOPHILS NFR BLD AUTO: 0.7 % — SIGNIFICANT CHANGE UP (ref 0–2)
BASOPHILS NFR BLD AUTO: 0.7 % — SIGNIFICANT CHANGE UP (ref 0–2)
BILIRUB SERPL-MCNC: 0.3 MG/DL — SIGNIFICANT CHANGE UP (ref 0.2–1.2)
BILIRUB SERPL-MCNC: 0.3 MG/DL — SIGNIFICANT CHANGE UP (ref 0.2–1.2)
BUN SERPL-MCNC: 15 MG/DL — SIGNIFICANT CHANGE UP (ref 7–23)
BUN SERPL-MCNC: 15 MG/DL — SIGNIFICANT CHANGE UP (ref 7–23)
CALCIUM SERPL-MCNC: 8.6 MG/DL — SIGNIFICANT CHANGE UP (ref 8.4–10.5)
CALCIUM SERPL-MCNC: 8.6 MG/DL — SIGNIFICANT CHANGE UP (ref 8.4–10.5)
CHLORIDE SERPL-SCNC: 105 MMOL/L — SIGNIFICANT CHANGE UP (ref 96–108)
CHLORIDE SERPL-SCNC: 105 MMOL/L — SIGNIFICANT CHANGE UP (ref 96–108)
CHOLEST SERPL-MCNC: 121 MG/DL — SIGNIFICANT CHANGE UP
CHOLEST SERPL-MCNC: 121 MG/DL — SIGNIFICANT CHANGE UP
CO2 SERPL-SCNC: 25 MMOL/L — SIGNIFICANT CHANGE UP (ref 22–31)
CO2 SERPL-SCNC: 25 MMOL/L — SIGNIFICANT CHANGE UP (ref 22–31)
CREAT SERPL-MCNC: 0.97 MG/DL — SIGNIFICANT CHANGE UP (ref 0.5–1.3)
CREAT SERPL-MCNC: 0.97 MG/DL — SIGNIFICANT CHANGE UP (ref 0.5–1.3)
CULTURE RESULTS: NO GROWTH — SIGNIFICANT CHANGE UP
CULTURE RESULTS: NO GROWTH — SIGNIFICANT CHANGE UP
CULTURE RESULTS: SIGNIFICANT CHANGE UP
CULTURE RESULTS: SIGNIFICANT CHANGE UP
EGFR: 93 ML/MIN/1.73M2 — SIGNIFICANT CHANGE UP
EGFR: 93 ML/MIN/1.73M2 — SIGNIFICANT CHANGE UP
EOSINOPHIL # BLD AUTO: 0.34 K/UL — SIGNIFICANT CHANGE UP (ref 0–0.5)
EOSINOPHIL # BLD AUTO: 0.34 K/UL — SIGNIFICANT CHANGE UP (ref 0–0.5)
EOSINOPHIL NFR BLD AUTO: 5.6 % — SIGNIFICANT CHANGE UP (ref 0–6)
EOSINOPHIL NFR BLD AUTO: 5.6 % — SIGNIFICANT CHANGE UP (ref 0–6)
GLUCOSE SERPL-MCNC: 97 MG/DL — SIGNIFICANT CHANGE UP (ref 70–99)
GLUCOSE SERPL-MCNC: 97 MG/DL — SIGNIFICANT CHANGE UP (ref 70–99)
HCT VFR BLD CALC: 37.2 % — LOW (ref 39–50)
HCT VFR BLD CALC: 37.2 % — LOW (ref 39–50)
HCT VFR BLD CALC: 38.3 % — LOW (ref 39–50)
HCT VFR BLD CALC: 38.3 % — LOW (ref 39–50)
HDLC SERPL-MCNC: 46 MG/DL — SIGNIFICANT CHANGE UP
HDLC SERPL-MCNC: 46 MG/DL — SIGNIFICANT CHANGE UP
HGB BLD-MCNC: 12.1 G/DL — LOW (ref 13–17)
HGB BLD-MCNC: 12.1 G/DL — LOW (ref 13–17)
HGB BLD-MCNC: 12.9 G/DL — LOW (ref 13–17)
HGB BLD-MCNC: 12.9 G/DL — LOW (ref 13–17)
IMM GRANULOCYTES NFR BLD AUTO: 0.8 % — SIGNIFICANT CHANGE UP (ref 0–0.9)
IMM GRANULOCYTES NFR BLD AUTO: 0.8 % — SIGNIFICANT CHANGE UP (ref 0–0.9)
LIPID PNL WITH DIRECT LDL SERPL: 60 MG/DL — SIGNIFICANT CHANGE UP
LIPID PNL WITH DIRECT LDL SERPL: 60 MG/DL — SIGNIFICANT CHANGE UP
LYMPHOCYTES # BLD AUTO: 1.23 K/UL — SIGNIFICANT CHANGE UP (ref 1–3.3)
LYMPHOCYTES # BLD AUTO: 1.23 K/UL — SIGNIFICANT CHANGE UP (ref 1–3.3)
LYMPHOCYTES # BLD AUTO: 20.2 % — SIGNIFICANT CHANGE UP (ref 13–44)
LYMPHOCYTES # BLD AUTO: 20.2 % — SIGNIFICANT CHANGE UP (ref 13–44)
MAGNESIUM SERPL-MCNC: 2.1 MG/DL — SIGNIFICANT CHANGE UP (ref 1.6–2.6)
MAGNESIUM SERPL-MCNC: 2.1 MG/DL — SIGNIFICANT CHANGE UP (ref 1.6–2.6)
MCHC RBC-ENTMCNC: 29.7 PG — SIGNIFICANT CHANGE UP (ref 27–34)
MCHC RBC-ENTMCNC: 29.7 PG — SIGNIFICANT CHANGE UP (ref 27–34)
MCHC RBC-ENTMCNC: 30.1 PG — SIGNIFICANT CHANGE UP (ref 27–34)
MCHC RBC-ENTMCNC: 30.1 PG — SIGNIFICANT CHANGE UP (ref 27–34)
MCHC RBC-ENTMCNC: 32.5 GM/DL — SIGNIFICANT CHANGE UP (ref 32–36)
MCHC RBC-ENTMCNC: 32.5 GM/DL — SIGNIFICANT CHANGE UP (ref 32–36)
MCHC RBC-ENTMCNC: 33.7 GM/DL — SIGNIFICANT CHANGE UP (ref 32–36)
MCHC RBC-ENTMCNC: 33.7 GM/DL — SIGNIFICANT CHANGE UP (ref 32–36)
MCV RBC AUTO: 89.3 FL — SIGNIFICANT CHANGE UP (ref 80–100)
MCV RBC AUTO: 89.3 FL — SIGNIFICANT CHANGE UP (ref 80–100)
MCV RBC AUTO: 91.2 FL — SIGNIFICANT CHANGE UP (ref 80–100)
MCV RBC AUTO: 91.2 FL — SIGNIFICANT CHANGE UP (ref 80–100)
MONOCYTES # BLD AUTO: 0.63 K/UL — SIGNIFICANT CHANGE UP (ref 0–0.9)
MONOCYTES # BLD AUTO: 0.63 K/UL — SIGNIFICANT CHANGE UP (ref 0–0.9)
MONOCYTES NFR BLD AUTO: 10.3 % — SIGNIFICANT CHANGE UP (ref 2–14)
MONOCYTES NFR BLD AUTO: 10.3 % — SIGNIFICANT CHANGE UP (ref 2–14)
NEUTROPHILS # BLD AUTO: 3.8 K/UL — SIGNIFICANT CHANGE UP (ref 1.8–7.4)
NEUTROPHILS # BLD AUTO: 3.8 K/UL — SIGNIFICANT CHANGE UP (ref 1.8–7.4)
NEUTROPHILS NFR BLD AUTO: 62.4 % — SIGNIFICANT CHANGE UP (ref 43–77)
NEUTROPHILS NFR BLD AUTO: 62.4 % — SIGNIFICANT CHANGE UP (ref 43–77)
NON HDL CHOLESTEROL: 74 MG/DL — SIGNIFICANT CHANGE UP
NON HDL CHOLESTEROL: 74 MG/DL — SIGNIFICANT CHANGE UP
NRBC # BLD: 0 /100 WBCS — SIGNIFICANT CHANGE UP (ref 0–0)
PHOSPHATE SERPL-MCNC: 3.1 MG/DL — SIGNIFICANT CHANGE UP (ref 2.5–4.5)
PHOSPHATE SERPL-MCNC: 3.1 MG/DL — SIGNIFICANT CHANGE UP (ref 2.5–4.5)
PLATELET # BLD AUTO: 162 K/UL — SIGNIFICANT CHANGE UP (ref 150–400)
PLATELET # BLD AUTO: 162 K/UL — SIGNIFICANT CHANGE UP (ref 150–400)
PLATELET # BLD AUTO: 163 K/UL — SIGNIFICANT CHANGE UP (ref 150–400)
PLATELET # BLD AUTO: 163 K/UL — SIGNIFICANT CHANGE UP (ref 150–400)
POTASSIUM SERPL-MCNC: 3.7 MMOL/L — SIGNIFICANT CHANGE UP (ref 3.5–5.3)
POTASSIUM SERPL-MCNC: 3.7 MMOL/L — SIGNIFICANT CHANGE UP (ref 3.5–5.3)
POTASSIUM SERPL-SCNC: 3.7 MMOL/L — SIGNIFICANT CHANGE UP (ref 3.5–5.3)
POTASSIUM SERPL-SCNC: 3.7 MMOL/L — SIGNIFICANT CHANGE UP (ref 3.5–5.3)
PROT SERPL-MCNC: 6.8 G/DL — SIGNIFICANT CHANGE UP (ref 6–8.3)
PROT SERPL-MCNC: 6.8 G/DL — SIGNIFICANT CHANGE UP (ref 6–8.3)
RBC # BLD: 4.08 M/UL — LOW (ref 4.2–5.8)
RBC # BLD: 4.08 M/UL — LOW (ref 4.2–5.8)
RBC # BLD: 4.29 M/UL — SIGNIFICANT CHANGE UP (ref 4.2–5.8)
RBC # BLD: 4.29 M/UL — SIGNIFICANT CHANGE UP (ref 4.2–5.8)
RBC # FLD: 12.9 % — SIGNIFICANT CHANGE UP (ref 10.3–14.5)
RBC # FLD: 12.9 % — SIGNIFICANT CHANGE UP (ref 10.3–14.5)
RBC # FLD: 13.1 % — SIGNIFICANT CHANGE UP (ref 10.3–14.5)
RBC # FLD: 13.1 % — SIGNIFICANT CHANGE UP (ref 10.3–14.5)
SODIUM SERPL-SCNC: 140 MMOL/L — SIGNIFICANT CHANGE UP (ref 135–145)
SODIUM SERPL-SCNC: 140 MMOL/L — SIGNIFICANT CHANGE UP (ref 135–145)
SPECIMEN SOURCE: SIGNIFICANT CHANGE UP
TRIGL SERPL-MCNC: 68 MG/DL — SIGNIFICANT CHANGE UP
TRIGL SERPL-MCNC: 68 MG/DL — SIGNIFICANT CHANGE UP
TSH SERPL-MCNC: 1.41 UIU/ML — SIGNIFICANT CHANGE UP (ref 0.27–4.2)
TSH SERPL-MCNC: 1.41 UIU/ML — SIGNIFICANT CHANGE UP (ref 0.27–4.2)
WBC # BLD: 6.09 K/UL — SIGNIFICANT CHANGE UP (ref 3.8–10.5)
WBC # BLD: 6.09 K/UL — SIGNIFICANT CHANGE UP (ref 3.8–10.5)
WBC # BLD: 6.2 K/UL — SIGNIFICANT CHANGE UP (ref 3.8–10.5)
WBC # BLD: 6.2 K/UL — SIGNIFICANT CHANGE UP (ref 3.8–10.5)
WBC # FLD AUTO: 6.09 K/UL — SIGNIFICANT CHANGE UP (ref 3.8–10.5)
WBC # FLD AUTO: 6.09 K/UL — SIGNIFICANT CHANGE UP (ref 3.8–10.5)
WBC # FLD AUTO: 6.2 K/UL — SIGNIFICANT CHANGE UP (ref 3.8–10.5)
WBC # FLD AUTO: 6.2 K/UL — SIGNIFICANT CHANGE UP (ref 3.8–10.5)

## 2024-01-11 PROCEDURE — 99223 1ST HOSP IP/OBS HIGH 75: CPT

## 2024-01-11 PROCEDURE — 99231 SBSQ HOSP IP/OBS SF/LOW 25: CPT

## 2024-01-11 RX ORDER — POLYETHYLENE GLYCOL 3350 17 G/17G
17 POWDER, FOR SOLUTION ORAL ONCE
Refills: 0 | Status: COMPLETED | OUTPATIENT
Start: 2024-01-11 | End: 2024-01-11

## 2024-01-11 RX ORDER — CEFTRIAXONE 500 MG/1
1000 INJECTION, POWDER, FOR SOLUTION INTRAMUSCULAR; INTRAVENOUS EVERY 24 HOURS
Refills: 0 | Status: COMPLETED | OUTPATIENT
Start: 2024-01-12 | End: 2024-01-13

## 2024-01-11 RX ADMIN — GABAPENTIN 300 MILLIGRAM(S): 400 CAPSULE ORAL at 17:37

## 2024-01-11 RX ADMIN — LACOSAMIDE 150 MILLIGRAM(S): 50 TABLET ORAL at 06:24

## 2024-01-11 RX ADMIN — POLYETHYLENE GLYCOL 3350 17 GRAM(S): 17 POWDER, FOR SOLUTION ORAL at 06:19

## 2024-01-11 RX ADMIN — LACOSAMIDE 150 MILLIGRAM(S): 50 TABLET ORAL at 17:37

## 2024-01-11 RX ADMIN — Medication 81 MILLIGRAM(S): at 11:24

## 2024-01-11 RX ADMIN — Medication 1 TABLET(S): at 11:24

## 2024-01-11 RX ADMIN — SODIUM CHLORIDE 50 MILLILITER(S): 9 INJECTION INTRAMUSCULAR; INTRAVENOUS; SUBCUTANEOUS at 17:37

## 2024-01-11 RX ADMIN — CEFTRIAXONE 100 MILLIGRAM(S): 500 INJECTION, POWDER, FOR SOLUTION INTRAMUSCULAR; INTRAVENOUS at 06:19

## 2024-01-11 RX ADMIN — ATORVASTATIN CALCIUM 20 MILLIGRAM(S): 80 TABLET, FILM COATED ORAL at 22:03

## 2024-01-11 RX ADMIN — PANTOPRAZOLE SODIUM 40 MILLIGRAM(S): 20 TABLET, DELAYED RELEASE ORAL at 06:19

## 2024-01-11 RX ADMIN — TAMSULOSIN HYDROCHLORIDE 0.4 MILLIGRAM(S): 0.4 CAPSULE ORAL at 22:03

## 2024-01-11 RX ADMIN — GABAPENTIN 300 MILLIGRAM(S): 400 CAPSULE ORAL at 06:19

## 2024-01-11 NOTE — PROGRESS NOTE ADULT - SUBJECTIVE AND OBJECTIVE BOX
Neurology        S: patient seen, resting in bed       Medications: MEDICATIONS  (STANDING):  aspirin enteric coated 81 milliGRAM(s) Oral daily  atorvastatin 20 milliGRAM(s) Oral at bedtime  cefTRIAXone   IVPB 1000 milliGRAM(s) IV Intermittent every 24 hours  gabapentin 300 milliGRAM(s) Oral two times a day  lacosamide 150 milliGRAM(s) Oral two times a day  multivitamin 1 Tablet(s) Oral daily  pantoprazole    Tablet 40 milliGRAM(s) Oral before breakfast  sodium chloride 0.9%. 1000 milliLiter(s) (50 mL/Hr) IV Continuous <Continuous>  tamsulosin 0.4 milliGRAM(s) Oral at bedtime    MEDICATIONS  (PRN):       Vitals:  Vital Signs Last 24 Hrs  T(C): 36.5 (11 Jan 2024 19:37), Max: 37 (11 Jan 2024 13:26)  T(F): 97.7 (11 Jan 2024 19:37), Max: 98.6 (11 Jan 2024 13:26)  HR: 75 (11 Jan 2024 19:37) (66 - 83)  BP: 113/69 (11 Jan 2024 19:37) (104/68 - 125/81)  BP(mean): --  RR: 18 (11 Jan 2024 19:37) (18 - 19)  SpO2: 97% (11 Jan 2024 19:37) (96% - 100%)    Parameters below as of 11 Jan 2024 16:40  Patient On (Oxygen Delivery Method): room air            General Exam:   General Appearance: Appropriately dressed and in no acute distress       Head: Normocephalic, atraumatic and no dysmorphic features  Ear, Nose, and Throat: Moist mucous membranes  CVS: S1S2+  Resp: No SOB, no wheeze or rhonchi  GI: soft NT/ND  Extremities: No edema or cyanosis  Skin: No bruises or rashes     Neurological Exam:  MS: Eyes open. Awake, alert, oriented to person, place, situation, time. Follows all commands. Attention/concentration, recent and remote memory, and fund of knowledge intact  Language: Speech is clear, fluent with good repetition & comprehension.  CNs: PERRL (R = 3mm, L = 3mm). VFF. EOMI No nystagmus.  No provocation on dizziness with head turning. V1-3 intact to LT b/l. No facial asymmetry b/l, full eye closure strength b/l. Hearing grossly normal (rubbing fingers) b/l. Tongue midline, normal movements, no atrophy. Palate with symmetric elevation. Trap 5/5 b/l  Motor: Normal muscle bulk & tone. No noticeable tremor. No pronator drift. No drift of UE or LE b/l.               Deltoid	Biceps	Triceps	   R	         5	      5	   5	 5	  		 	  L	         5	      5	   5	 5	  		    	H-Flex	K-Flex	K-Ext	D-Flex	P-Flex  R	    5	  5	   5	  5	    5		   L	    5	   5	   5	  4	    5		   *Some pain limitation on LLE.   Sensation: Intact to LT b/l throughout.   Cortical: Extinction on DSS (neglect): none  Reflexes:              Biceps(C5)       BR(C6)     Triceps(C7)               Patellar(L4)    Achilles(S1)    Plantar Resp  R	              3	          3	             3		 3		    3	Withdrawal       L	              3	          3	             3		 2		    1	Withdrawal    Coordination: No dysmetria to FTN/HTS b/l.   Gait: Deferred.     Data/Labs/Imaging which I personally reviewed.     Labs:   \  LABS:                          12.1   6.20  )-----------( 163      ( 11 Jan 2024 19:03 )             37.2     01-11    140  |  105  |  15  ----------------------------<  97  3.7   |  25  |  0.97    Ca    8.6      11 Jan 2024 07:13  Phos  3.1     01-11  Mg     2.1     01-11    TPro  6.8  /  Alb  3.9  /  TBili  0.3  /  DBili  x   /  AST  15  /  ALT  19  /  AlkPhos  66  01-11    LIVER FUNCTIONS - ( 11 Jan 2024 07:13 )  Alb: 3.9 g/dL / Pro: 6.8 g/dL / ALK PHOS: 66 U/L / ALT: 19 U/L / AST: 15 U/L / GGT: x           PT/INR - ( 10 Gerardo 2024 00:31 )   PT: 11.2 sec;   INR: 1.02 ratio         PTT - ( 10 Gerardo 2024 00:31 )  PTT:37.0 sec  Urinalysis Basic - ( 11 Jan 2024 07:13 )    Color: x / Appearance: x / SG: x / pH: x  Gluc: 97 mg/dL / Ketone: x  / Bili: x / Urobili: x   Blood: x / Protein: x / Nitrite: x   Leuk Esterase: x / RBC: x / WBC x   Sq Epi: x / Non Sq Epi: x / Bacteria: x

## 2024-01-11 NOTE — PROGRESS NOTE ADULT - ASSESSMENT
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  Saw his urologist  was started on an antibioti. Patient also reports several episodes of severe intermittent left flank pain today, denies history of kidney stones to his knowledge.  Denies associated nausea, vomiting, diarrhea, dysuria, fever, chill  LDL 60  LE duplex with + B/L below knee DVT   outpatient hypercoag workup in past neg as part of stroke workup     Impression:   1) chronic strokes, resid [facility]FD  2) seizure d/o 2/2 storkes  3) ADHD  4) vertigo BPPV     -0 resume AC when able   - for ADHD gets Vyvanse 50mg daily  - for seizure he is on vimpat should now be 150mg BID  and was taken off keppra   - f/u heme/onc, new dvt was on lovenox   - for secondary stroke prevention he is on asa 81mg daily and full dose lovenox .  - statin therapy with LDL goal < 70mg/dL; atorvastatin 20 at home   - f/u  for flank pain, hematuria and obstruction   - abx for UTI   - PT/OT   - meclizion prn for vertigo   - check FS, glucose control <180  - GI/DVT    - Thank you for allowing me to participate in the care of this patient. Call with questions.   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  Saw his urologist  was started on an antibioti. Patient also reports several episodes of severe intermittent left flank pain today, denies history of kidney stones to his knowledge.  Denies associated nausea, vomiting, diarrhea, dysuria, fever, chill  LDL 60  LE duplex with + B/L below knee DVT   outpatient hypercoag workup in past neg as part of stroke workup     Impression:   1) chronic strokes, resid [facility]FD  2) seizure d/o 2/2 storkes  3) ADHD  4) vertigo BPPV     -0 resume AC when able   - for ADHD gets Vyvanse 50mg daily  - for seizure he is on vimpat should now be 150mg BID  and was taken off keppra   - f/u heme/onc, new dvt was on lovenox   - for secondary stroke prevention he is on asa 81mg daily and full dose lovenox .  - statin therapy with LDL goal < 70mg/dL; atorvastatin 20 at home   - f/u  for flank pain, hematuria and obstruction   - abx for UTI   - PT/OT   - meclizion prn for vertigo   - check FS, glucose control <180  - GI/DVT    - Thank you for allowing me to participate in the care of this patient. Call with questions.   known to me from office   Braxton Forde MD  Vascular Neurology  Office: [phone]

## 2024-01-11 NOTE — CONSULT NOTE ADULT - ASSESSMENT
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    hx DVT  --patient w/ known history of R LE thrombosis and echogenic linear thrombus in L popliteal vain on b/l LE doppler performed 5/3/23. follow up study done 8/28/23 w/ noted chronic post thrombotic changes but no evidence of DVT  --LE duplex done inpatient on 1/10 w/ Thrombus in the bilateral soleal veins and right peroneal vein.  --outpatient hypercoagulable was negative  --patient previously treated w/ Eliquis but transitioned to Lovenox d/t therapy failure   --will recommend transitioning from Lovenox QD to BID at 1mg/kg  --AC currently on hold d/t hematuria  --vascular surgery consulted, no surgical intervention recommended at this time    hematuria/UTI  --CT a/p w/  Mild left hydronephrosis with abrupt tapering at the ureteropelvic junction. There is evidence of hemorrhage into the dilated left upper   renal pole collecting system and associated inflammatory changes throughout the perinephric fat. 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    hx DVT  --patient w/ known history of R LE thrombosis and echogenic linear thrombus in L popliteal vain on b/l LE doppler performed 5/3/23. follow up study done 8/28/23 w/ noted chronic post thrombotic changes but no evidence of DVT  --LE duplex done inpatient on 1/10 w/ Thrombus in the bilateral soleal veins and right peroneal vein.  --outpatient hypercoagulable was negative  --patient previously treated w/ Eliquis but transitioned to Lovenox d/t therapy failure   --will recommend transitioning from Lovenox QD to BID at 1mg/kg  --AC currently on hold d/t hematuria  --vascular surgery consulted, no surgical intervention recommended at this time    hematuria/UTI  --CT a/p w/  Mild left hydronephrosis with abrupt tapering at the ureteropelvic junction. There is evidence of hemorrhage into the dilated left upper   renal pole collecting system and associated inflammatory changes throughout the perinephric fat. No radiodense calculi identifie  --on ceftriaxone for UTI/ hydronephrosis   --renal following  --AC on hold    will follow,    Luzmaria Xiong NP  Hematology/ Oncology  [facility]  [phone] (office)  [phone] (alt office)  Evenings and weekends please call MD on call or office

## 2024-01-11 NOTE — PROGRESS NOTE ADULT - ASSESSMENT
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 Patient reports that when his hematuria began, he attributed it to increase intake of cranberry juice. Patient states that when his hematuria did not subside, he presented to his outpatient Urologist's office for which he was started on Cipro for UTI and only took one dose. Patient also endorses left flank pain. Patient denies history of nephrolithiasis or previous episodes of hematuria. Patient denies dysuria, frequency or urgency, chills, body aches, nausea, vomiting, headache, lightheadedness or dizziness.       Hematuria   - Pt reports last dose of Lovenox injection was 11/09 AM prior to arrival   - Hgb stable, however with gross hematuria and hemorrhage seen on CT   - Hold Lovenox for now -->  Resume when able to   - Check CBC BID - Monitor H/H and Hgb closely --> F/u Repeat CBC   - Urology evaluation appreciated; F/u recs    UTI   - With associated flank pain. UA grossly positive. Started on Rocephin 2g Q24 hours for now  - F/u UCx --> negative   - IVF for hydration   - C/w Rocephin   - Monitor CBC, Temp Curve, VS and patient closely     Hydronephrosis   - CT w/ mild L hydronephrosis with abrupt tapering of the dilated renal collecting system at the level of the UPJ, Hyperdense material within the dilated L upper renal pole most likely representing hemorrhage, Moderate L Perinephric/ periureteric stranding, no calculi identified   - C/w Tamsulosin; Monitor I and O   - As per Urology, patient will require close outpatient follow up and work up for UPJ obstruction   - Urology eval appreciated; F/u recs    Hemorrhage   - Seen on CT as above  - Check serial CBC BID   - Monitor H/H closely. Transfuse for Hgb < 7.0   - Urology eval appreciated; F/u recs    DVT  - LE Duplex w/ B/L soleal vein and right peroneal vein thrombus, Acute DVT below the knee    - On Lovenox for  Mg BID, now on hold in the setting of hematuria. Resume as able to   - Vascular Cardiology eval appreciated; F/u recs --> Planned for surveillance duplex to assess for propogation   - Hematology consulted; F/u recs     Seizure   - Per history   - Have asked pharmacy to obtain Med Rec for home medications   - Resume home Vimpat 150 BID as per Neuro.  - Per Neurology, patient is no longer on Keppra. Will DC   - Seizure precautions  - Neuro eval appreciated; F/u recs    CVA  - Per History   - On ASA, Lovenox and Statin as home --> C/w ASA and Statin   - Lovenox on hold for now in view of hematuria.   - C/w Statin therapy   - Neuro eval appreciated     ADHD  - Vyvanse 50 Mg not available in patient. Resume on DC  - Fall, Seizure, Aspiration precautions  - Neuro eval appreciated; F/u recs     LLE Fasciotomy   - 2/2 compartment syndrome.   - Vascular surgery eval appreciated; F/u recs --> No acute intervention at this time     HLD  - Lipid panel noted; C/w Lipitor 20    Vertigo  - Per History. No longer on Meclizine per patient  - Fall, Seizure and Aspiration precautions   - Ambulate with Assistance  - PT eval     PPX  - Resume Lovenox as able to   - PPI         Discussed with Attending, Vascular Cardiology and ACP.      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 Patient reports that when his hematuria began, he attributed it to increase intake of cranberry juice. Patient states that when his hematuria did not subside, he presented to his outpatient Urologist's office for which he was started on Cipro for UTI and only took one dose. Patient also endorses left flank pain. Patient denies history of nephrolithiasis or previous episodes of hematuria. Patient denies dysuria, frequency or urgency, chills, body aches, nausea, vomiting, headache, lightheadedness or dizziness.       Hematuria   - Pt reports last dose of Lovenox injection was 11/09 AM prior to arrival   - Hgb stable, however with gross hematuria and hemorrhage seen on CT   - Hold Lovenox for now -->  Resume when able to   - Check CBC BID - Monitor H/H and Hgb closely --> F/u Repeat CBC   - Urology evaluation appreciated; F/u recs    UTI   - With associated flank pain. UA grossly positive. Started on Rocephin 2g Q24 hours for now  - F/u UCx --> negative   - IVF for hydration   - C/w Rocephin   - Monitor CBC, Temp Curve, VS and patient closely     Hydronephrosis   - CT w/ mild L hydronephrosis with abrupt tapering of the dilated renal collecting system at the level of the UPJ, Hyperdense material within the dilated L upper renal pole most likely representing hemorrhage, Moderate L Perinephric/ periureteric stranding, no calculi identified   - C/w Tamsulosin; Monitor I and O   - As per Urology, patient will require close outpatient follow up and work up for UPJ obstruction   - Urology eval appreciated; F/u recs    Hemorrhage   - Seen on CT as above  - Check serial CBC BID   - Monitor H/H closely. Transfuse for Hgb < 7.0   - Urology eval appreciated; F/u recs    DVT  - LE Duplex w/ B/L soleal vein and right peroneal vein thrombus, Acute DVT below the knee    - On Lovenox for  Mg BID, now on hold in the setting of hematuria. Resume as able to   - Vascular Cardiology eval appreciated; F/u recs --> Planned for surveillance duplex to assess for propogation   - Hematology consulted; F/u recs     Seizure   - Per history   - Have asked pharmacy to obtain Med Rec for home medications   - Resume home Vimpat 150 BID as per Neuro.  - Per Neurology, patient is no longer on Keppra. Will DC   - Seizure precautions  - Neuro eval appreciated; F/u recs    CVA  - Per History   - On ASA, Lovenox and Statin as home --> C/w ASA and Statin   - Lovenox on hold for now in view of hematuria.   - C/w Statin therapy   - Neuro eval appreciated     ADHD  - Vyvanse 50 Mg not available in patient. Resume on DC  - Fall, Seizure, Aspiration precautions  - Neuro eval appreciated; F/u recs     LLE Fasciotomy   - 2/2 compartment syndrome.   - Vascular surgery eval appreciated; F/u recs --> No acute intervention at this time     HLD  - Lipid panel noted; C/w Lipitor 20    Vertigo  - Per History. No longer on Meclizine per patient  - Fall, Seizure and Aspiration precautions   - Ambulate with Assistance  - PT eval     PPX  - Resume Lovenox as able to   - PPI         Discussed with Attending, Vascular Cardiology and ACP.      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 Patient reports that when his hematuria began, he attributed it to increase intake of cranberry juice. Patient states that when his hematuria did not subside, he presented to his outpatient Urologist's office for which he was started on Cipro for UTI and only took one dose. Patient also endorses left flank pain. Patient denies history of nephrolithiasis or previous episodes of hematuria. Patient denies dysuria, frequency or urgency, chills, body aches, nausea, vomiting, headache, lightheadedness or dizziness.       Hematuria   - Pt reports last dose of Lovenox injection was 11/09 AM prior to arrival   - Hgb stable, however with gross hematuria and hemorrhage seen on CT   - Hold Lovenox for now -->  Resume when able to   - Check CBC BID - Monitor H/H and Hgb closely --> F/u Repeat CBC   - Urology evaluation appreciated; F/u recs    UTI   - With associated flank pain. UA grossly positive. Started on Rocephin 2g Q24 hours for now  - F/u UCx --> negative   - IVF for hydration   - C/w Rocephin, will reduce to 1g Qd   - Monitor CBC, Temp Curve, VS and patient closely     Hydronephrosis   - CT w/ mild L hydronephrosis with abrupt tapering of the dilated renal collecting system at the level of the UPJ, Hyperdense material within the dilated L upper renal pole most likely representing hemorrhage, Moderate L Perinephric/ periureteric stranding, no calculi identified   - C/w Tamsulosin; Monitor I and O   - As per Urology, patient will require close outpatient follow up and work up for UPJ obstruction   - Urology eval appreciated; F/u recs    Hemorrhage   - Seen on CT as above  - Check serial CBC BID   - Monitor H/H closely. Transfuse for Hgb < 7.0   - Urology eval appreciated; F/u recs    DVT  - LE Duplex w/ B/L soleal vein and right peroneal vein thrombus, Acute DVT below the knee    - On Lovenox for  Mg BID, now on hold in the setting of hematuria. Resume as able to   - Vascular Cardiology eval appreciated; F/u recs --> Planned for surveillance duplex to assess for propagation on 1/12   - Hematology consulted; F/u recs     Seizure   - Per history   - Have asked pharmacy to obtain Med Rec for home medications   - Resume home Vimpat 150 BID as per Neuro.  - Per Neurology, patient is no longer on Keppra. Will DC   - Seizure precautions  - Neuro eval appreciated; F/u recs    CVA  - Per History   - On ASA, Lovenox and Statin as home --> C/w ASA and Statin   - Lovenox on hold for now in view of hematuria.   - C/w Statin therapy   - Neuro eval appreciated     ADHD  - Vyvanse 50 Mg not available in patient. Resume on DC  - Fall, Seizure, Aspiration precautions  - Neuro eval appreciated; F/u recs     LLE Fasciotomy   - 2/2 compartment syndrome.   - Vascular surgery eval appreciated; F/u recs --> No acute intervention at this time     HLD  - Lipid panel noted; C/w Lipitor 20    Vertigo  - Per History. No longer on Meclizine per patient  - Fall, Seizure and Aspiration precautions   - Ambulate with Assistance  - PT eval     PPX  - Resume Lovenox as able to   - PPI         Discussed with Attending, Vascular Cardiology and ACP.      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 with a chief complaint of 4-5 days of gross hematuria of dark red color with occasional passage of clots associated with suprapubic and left sided flank pain. Patient reports that when his hematuria began, he attributed it to increase intake of cranberry juice. Patient states that when his hematuria did not subside, he presented to his outpatient Urologist's office for which he was started on Cipro for UTI and only took one dose. Patient also endorses left flank pain. Patient denies history of nephrolithiasis or previous episodes of hematuria. Patient denies dysuria, frequency or urgency, chills, body aches, nausea, vomiting, headache, lightheadedness or dizziness.       Hematuria   - Pt reports last dose of Lovenox injection was 11/09 AM prior to arrival   - Hgb stable, however with gross hematuria and hemorrhage seen on CT   - Hold Lovenox for now -->  Resume when able to   - Check CBC BID - Monitor H/H and Hgb closely --> F/u Repeat CBC   - Urology evaluation appreciated; F/u recs    UTI   - With associated flank pain. UA grossly positive. Started on Rocephin 2g Q24 hours for now  - F/u UCx --> negative   - IVF for hydration   - C/w Rocephin, will reduce to 1g Qd   - Monitor CBC, Temp Curve, VS and patient closely     Hydronephrosis   - CT w/ mild L hydronephrosis with abrupt tapering of the dilated renal collecting system at the level of the UPJ, Hyperdense material within the dilated L upper renal pole most likely representing hemorrhage, Moderate L Perinephric/ periureteric stranding, no calculi identified   - C/w Tamsulosin; Monitor I and O   - As per Urology, patient will require close outpatient follow up and work up for UPJ obstruction   - Urology eval appreciated; F/u recs    Hemorrhage   - Seen on CT as above  - Check serial CBC BID   - Monitor H/H closely. Transfuse for Hgb < 7.0   - Urology eval appreciated; F/u recs    DVT  - LE Duplex w/ B/L soleal vein and right peroneal vein thrombus, Acute DVT below the knee    - On Lovenox for  Mg BID, now on hold in the setting of hematuria. Resume as able to   - Vascular Cardiology eval appreciated; F/u recs --> Planned for surveillance duplex to assess for propagation on 1/12   - Hematology consulted; F/u recs     Seizure   - Per history   - Have asked pharmacy to obtain Med Rec for home medications   - Resume home Vimpat 150 BID as per Neuro.  - Per Neurology, patient is no longer on Keppra. Will DC   - Seizure precautions  - Neuro eval appreciated; F/u recs    CVA  - Per History   - On ASA, Lovenox and Statin as home --> C/w ASA and Statin   - Lovenox on hold for now in view of hematuria.   - C/w Statin therapy   - Neuro eval appreciated     ADHD  - Vyvanse 50 Mg not available in patient. Resume on DC  - Fall, Seizure, Aspiration precautions  - Neuro eval appreciated; F/u recs     LLE Fasciotomy   - 2/2 compartment syndrome.   - Vascular surgery eval appreciated; F/u recs --> No acute intervention at this time     HLD  - Lipid panel noted; C/w Lipitor 20    Vertigo  - Per History. No longer on Meclizine per patient  - Fall, Seizure and Aspiration precautions   - Ambulate with Assistance  - PT eval     PPX  - Resume Lovenox as able to   - PPI         Discussed with Attending, Vascular Cardiology and ACP.

## 2024-01-11 NOTE — PROGRESS NOTE ADULT - SUBJECTIVE AND OBJECTIVE BOX
Subjective  Patient seen and examined. Resting comfortably.    Objective    Vital signs  T(F): , Max: 98.2 (01-10-24 @ 17:30)  HR: 81 (01-11-24 @ 04:10)  BP: 108/67 (01-11-24 @ 04:10)  SpO2: 100% (01-11-24 @ 04:10)  Wt(kg): --    Output       Gen: NAD  Abd: soft, nontender, nondistended  : Voiding dark red urine    Labs      01-11 @ 07:13    WBC 6.09  / Hct 38.3  / SCr 0.97     01-10 @ 17:29    WBC 11.51 / Hct 39.3  / SCr --       Urine Cx: pending

## 2024-01-11 NOTE — PATIENT PROFILE ADULT - FUNCTIONAL ASSESSMENT - BASIC MOBILITY 6.
2-calculated by average/Not able to assess (calculate score using [facility] averaging method) 2-calculated by average/Not able to assess (calculate score using [facility] averaging method)  3-calculated by average/Not able to assess (calculate score using [facility] averaging method)  3-calculated by average/Not able to assess (calculate score using [facility] averaging method)

## 2024-01-11 NOTE — CONSULT NOTE ADULT - NS ATTEND AMEND GEN_ALL_CORE FT
52 y/o M with a history of multiple embolic infarcts c/b seizures, compartment syndrome LLE requiring fasciotomy, and PFO who follows with Dr. Varela in our practice for prior bilateral DVT's (5/2023). He is maintained outpatient on lovenox 1.5mg/kg/day (has failed eliquis in the past). He presents to the ER for gross hematuria and intermittent suprapubic pain. LE duplex here showed DVT's in bilateral soleal veins and the right peroneal vein - reportedly reviewed by vascular, felt to be more likely representative of chronic clot. Last doppler US outpatient was 8/2023 and reported sequela of prior DVT in the right peroneal vein w/mural thickening, and no evidence of DVT in the LLE. AC currently on hold d/t hematuria. CT shows UPJ obstruction with mild hydro, urology following - recommended outpatient work-up. On ceftriaxone. 54 y/o M with a history of multiple embolic infarcts c/b seizures, compartment syndrome LLE requiring fasciotomy, and PFO who follows with Dr. Varela in our practice for prior bilateral DVT's (5/2023). He is maintained outpatient on lovenox 1.5mg/kg/day, ?trouble injecting (has failed eliquis in the past). He presents to the ER for gross hematuria and intermittent suprapubic pain. LE duplex here showed DVT's in bilateral soleal veins and the right peroneal vein - reportedly reviewed by vascular, felt to be more likely representative of chronic clot. Last doppler US outpatient was 8/2023 and reported sequela of prior DVT in the right peroneal vein w/mural thickening, and no evidence of DVT in the LLE. AC currently on hold d/t hematuria. CT shows UPJ obstruction with mild hydro, urology following. On ceftriaxone. Would recommend inpatient work-up for hematuria as the patient needs to be on lovenox from neurologic perspective and hematologic perspective - he has had multiple strokes and DVT's without identifiable hypercoagulable etiology. High risk for recurrent clots. Can consider changing to lovenox 1mg/kg/day for high risk and to avoid peak.

## 2024-01-11 NOTE — PROGRESS NOTE ADULT - ASSESSMENT
53 year old male with bilateral lower extremity below knee DVTs. No limb threatening symptoms at this time - no phlegmasia or vascular compromise. No symptoms or physical exam findings of PAD. Has history of LLE DVT causing compartment syndrome requiring fasciotomy. On AC at home (Lovenox managed by hematology). AC on hold in setting of hematuria.    Plan:  - Resume AC when able at discretion of primary team, recommend holding AV if having hematuria  - No acute vascular surgery interventions at this time  - Repeat b/l LUE duplex in 1 week to assess for below knee DVT propagation  - Vascular surgery will follow    Vascular Surgery [phone]   53 year old male with bilateral lower extremity below knee DVTs. No limb threatening symptoms at this time - no phlegmasia or vascular compromise. No symptoms or physical exam findings of PAD. Has history of LLE DVT causing compartment syndrome requiring fasciotomy. On AC at home (Lovenox managed by hematology). AC on hold in setting of hematuria.    Plan:  - Resume AC when able at discretion of primary team, recommend holding AV if having hematuria  - No acute vascular surgery interventions at this time  - Repeat b/l LUE duplex in 1 week to assess for below knee DVT propagation  - Vascular surgery will follow    Vascular Surgery [phone]   53 year old male with bilateral lower extremity below knee DVTs. No limb threatening symptoms at this time - no phlegmasia or vascular compromise. No symptoms or physical exam findings of PAD. Has history of LLE DVT causing compartment syndrome requiring fasciotomy. On AC at home (Lovenox managed by hematology). AC on hold in setting of hematuria.    Plan:  - Resume AC when able at discretion of primary team, recommend holding AC if having hematuria  - No acute vascular surgery interventions at this time  - Repeat b/l LUE duplex in 1 week to assess for below knee DVT propagation  - Vascular surgery will follow    Vascular Surgery [phone]   53 year old male with bilateral lower extremity below knee DVTs. No limb threatening symptoms at this time - no phlegmasia or vascular compromise. No symptoms or physical exam findings of PAD. Has history of LLE DVT causing compartment syndrome requiring fasciotomy. On AC at home (Lovenox managed by hematology). AC on hold in setting of hematuria.    Plan:  - Resume AC when able at discretion of primary team, recommend holding AC if having hematuria  - No acute vascular surgery interventions at this time  - Repeat b/l LUE duplex in 1 week to assess for below knee DVT propagation  - Vascular surgery will follow    Vascular Surgery [phone]

## 2024-01-11 NOTE — PROGRESS NOTE ADULT - ASSESSMENT
53y Male PMH CVA with residual word finding difficulty, bilateral DVTs on chronic Lovenox SQ and ASA81 (last dose yesterday), compartment syndrome status post left fasciotomy, HLD, PFO, vertigo, seizure disorder who presents to [facility] ED on 1/9/2024 for gross hematuria and intermittent suprapubic and L flank pain. CT abd pelvis showed mild L hydro to the level of the UPJ with associated blood products in the collecting system. Labs showed WBC 13.60, Hct 42.5, Cr 1.14. UA pending. PVR 55cc.    Recs:  -f/u urine culture and continue abx per primary  -trend h/h - stable  -continue hydration and monitor urine color  -patient will ultimately need close outpatient follow up/work up for UPJ obstruction  -Discussed with Dr. Loni Dumont [place] [facility]  [address] [place] [id number] [phone]     53y Male PMH CVA with residual word finding difficulty, bilateral DVTs on chronic Lovenox SQ and ASA81 (last dose yesterday), compartment syndrome status post left fasciotomy, HLD, PFO, vertigo, seizure disorder who presents to [facility] ED on 1/9/2024 for gross hematuria and intermittent suprapubic and L flank pain. CT abd pelvis showed mild L hydro to the level of the UPJ with associated blood products in the collecting system. Labs showed WBC 13.60, Hct 42.5, Cr 1.14. UA pending. PVR 55cc.    Recs:  -f/u urine culture and continue abx per primary  -trend h/h - stable  -continue hydration and monitor urine color  -patient will ultimately need close outpatient follow up/work up for UPJ obstruction  -Discussed with Dr. Loni Dumont [place] [facility]  [address] [place] [id number] [phone]

## 2024-01-11 NOTE — PROGRESS NOTE ADULT - SUBJECTIVE AND OBJECTIVE BOX
Surgery Progress Note    S: Patient seen and examined. No acute events overnight.    O:  PHYSICAL EXAM:  Gen: NAD  CV: Regular rate  Resp: Nonlabored breathing on room air  Ext:  - Bilateral lower extremities are warm and well perfused  - Well healed LLE fasciotomy sites  - Bilateral lower extremity compartments are compressible  - Bilateral lower extremities without evidence of vascular compromise/phlegmasia  Vasc: Bilateral DP and PT pulses are palpable    Vital Signs Last 24 Hrs  T(C): 36.6 (11 Jan 2024 09:28), Max: 36.8 (10 Gerardo 2024 17:30)  T(F): 97.8 (11 Jan 2024 09:28), Max: 98.2 (10 Gerardo 2024 17:30)  HR: 80 (11 Jan 2024 09:28) (63 - 83)  BP: 117/80 (11 Jan 2024 09:28) (108/67 - 150/78)  BP(mean): --  RR: 19 (11 Jan 2024 09:28) (17 - 19)  SpO2: 99% (11 Jan 2024 09:28) (96% - 100%)    Parameters below as of 11 Jan 2024 09:28  Patient On (Oxygen Delivery Method): room air        I&O's Detail                            12.9   6.09  )-----------( 162      ( 11 Jan 2024 07:13 )             38.3       01-11    140  |  105  |  15  ----------------------------<  97  3.7   |  25  |  0.97    Ca    8.6      11 Jan 2024 07:13  Phos  3.1     01-11  Mg     2.1     01-11    TPro  6.8  /  Alb  3.9  /  TBili  0.3  /  DBili  x   /  AST  15  /  ALT  19  /  AlkPhos  66  01-11

## 2024-01-11 NOTE — CONSULT NOTE ADULT - SUBJECTIVE AND OBJECTIVE BOX
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 Patient reports that when his hematuria began, he attributed it to increase intake of cranberry juice. Patient states that when his hematuria did not subside, he presented to his outpatient Urologist's office for which he was started on Cipro for UTI and only took one dose. Patient also endorses left flank pain. Patient denies history of nephrolithiasis or previous episodes of hematuria. Patient denies dysuria, frequency or urgency, chills, body aches, nausea, vomiting, headache, lightheadedness or dizziness.         Hematology/Oncology called to see patient who follows with Dr Fito Varela of [facility] for hypercoagulable w/up and pts history of TIA and DVT      PAST MEDICAL & SURGICAL HISTORY:  History of TIAs      CVA (cerebrovascular accident)      HLD (hyperlipidemia)      Vertigo      Unresponsiveness      History of fasciotomy          FAMILY HISTORY:  No pertinent family history in first degree relatives        Alochol: Denied  Smoking: Nonsmoker  Drug Use: Denied  Marital Status:         Allergies    No Known Allergies    Intolerances        MEDICATIONS  (STANDING):  aspirin enteric coated 81 milliGRAM(s) Oral daily  atorvastatin 20 milliGRAM(s) Oral at bedtime  cefTRIAXone   IVPB 1000 milliGRAM(s) IV Intermittent every 24 hours  gabapentin 300 milliGRAM(s) Oral two times a day  lacosamide 150 milliGRAM(s) Oral two times a day  multivitamin 1 Tablet(s) Oral daily  pantoprazole    Tablet 40 milliGRAM(s) Oral before breakfast  sodium chloride 0.9%. 1000 milliLiter(s) (50 mL/Hr) IV Continuous <Continuous>  tamsulosin 0.4 milliGRAM(s) Oral at bedtime    MEDICATIONS  (PRN):      ROS  No fever, sweats, chills  No epistaxis, HA, sore throat  No CP, SOB, cough, sputum  No n/v/d, abd pain, melena, hematochezia  No edema  No rash  No anxiety  No back pain, joint pain  No bleeding, bruising  No dysuria, hematuria    T(C): 37 (01-11-24 @ 13:26), Max: 37 (01-11-24 @ 13:26)  HR: 74 (01-11-24 @ 14:24) (63 - 83)  BP: 125/81 (01-11-24 @ 14:24) (108/67 - 150/78)  RR: 18 (01-11-24 @ 13:26) (18 - 19)  SpO2: 96% (01-11-24 @ 14:24) (96% - 100%)  Wt(kg): --    PE  NAD  Awake, alert  Anicteric, MMM  RRR  CTAB  Abd soft, NT, ND  No c/c/e  No rash grossly  FROM                          12.9   6.09  )-----------( 162      ( 11 Jan 2024 07:13 )             38.3       01-11    140  |  105  |  15  ----------------------------<  97  3.7   |  25  |  0.97    Ca    8.6      11 Jan 2024 07:13  Phos  3.1     01-11  Mg     2.1     01-11    TPro  6.8  /  Alb  3.9  /  TBili  0.3  /  DBili  x   /  AST  15  /  ALT  19  /  AlkPhos  66  01-11  ACC: [id number] EXAM:  DUPLEX SCAN EXT VEINS LOWER BI   ORDERED BY:    FAITH POLANCO     PROCEDURE DATE:  01/10/2024          INTERPRETATION:  CLINICAL INFORMATION: Lower extremity edema, rule out   DVT.    COMPARISON: 8/28/2023.    TECHNIQUE: Duplex sonography of the BILATERAL LOWER extremity veins with   color and spectral Doppler, with and without compression.    FINDINGS:    RIGHT:  Normal compressibility of the RIGHT common femoral, femoral and popliteal   veins.  Doppler examination shows normal spontaneous and phasic flow.  Thrombus in the right soleal vein and right peroneal vein.  Groin lymph nodes, largest measuring 1.9 x 0.8 x 0.8 cm.    LEFT:  Normal compressibility of the LEFT common femoral, femoral and popliteal   veins.  Doppler examination shows normal spontaneous and phasic flow.  Thrombus in the left soleal vein.  Groin lymph nodes, largest measuring 2.4 x 0.8 x 1.9 cm.    IMPRESSION:  Acute deep venous thrombosis: below the knee.    Thrombus in the bilateral soleal veins and right peroneal vein.    Findings were discussed with Dr. Trevizo 1/10/2024 6:55 AM by Dr. Heard with   read back confirmation.    --- End of Report ---    ACC: [id number] EXAM:  CT ABDOMEN AND PELVIS   ORDERED BY: RAYSHAWN ROMAN     PROCEDURE DATE:  01/10/2024          INTERPRETATION:  CLINICAL INFORMATION: Hematuria. Left flank pain.    COMPARISON: None.    CONTRAST/COMPLICATIONS:  IV Contrast: NONE  Oral Contrast: NONE  Complications: None reported at time of study completion    PROCEDURE:  CT of the Abdomen and Pelvis was performed.  Sagittal and coronal reformats were performed.    FINDINGS:  LOWER CHEST: Within normal limits.    LIVER: Enlarged measuring up to 22 cm.  BILE DUCTS: Normal caliber.  GALLBLADDER: Within normal limits.  SPLEEN: Within normal limits.  PANCREAS: Within normal limits.  ADRENALS: Within normal limits.  KIDNEYS/URETERS: There is mild left hydronephrosis with abrupt tapering   of the dilated renal collecting system at the level of the ureteropelvic   junction. Hyperdense material within the dilated left upper renal pole   collecting system most likely represents hemorrhage. No radiodense renal   calculi identified. Moderate left perinephric/periureteric stranding   noted, possibly reactive/inflammatory/or infectious in nature. A   retroaortic left renal vein.    BLADDER: Within normal limits.  REPRODUCTIVE ORGANS: Prostate is enlarged.    BOWEL: No bowel obstruction. Appendix is normal.  PERITONEUM: No ascites.  VESSELS: Within normal limits.  RETROPERITONEUM/LYMPH NODES: No lymphadenopathy.  ABDOMINAL WALL: Within normal limits.  BONES: Within normal limits.    IMPRESSION:    Mild left hydronephrosis with abrupt tapering at the ureteropelvic   junction. There is evidence of hemorrhage into the dilated left upper   renal pole collecting system and associated inflammatory changes   throughout the perinephric fat. No radiodense calculi identified. Urology   evaluation is advised. 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 with a chief complaint of 4-5 days of gross hematuria of dark red color with occasional passage of clots associated with suprapubic and left sided flank pain. Patient reports that when his hematuria began, he attributed it to increase intake of cranberry juice. Patient states that when his hematuria did not subside, he presented to his outpatient Urologist's office for which he was started on Cipro for UTI and only took one dose. Patient also endorses left flank pain. Patient denies history of nephrolithiasis or previous episodes of hematuria. Patient denies dysuria, frequency or urgency, chills, body aches, nausea, vomiting, headache, lightheadedness or dizziness.         Hematology/Oncology called to see patient who follows with Dr Fito Varela of [facility] for hypercoagulable w/up and pts history of TIA and DVT      PAST MEDICAL & SURGICAL HISTORY:  History of TIAs      CVA (cerebrovascular accident)      HLD (hyperlipidemia)      Vertigo      Unresponsiveness      History of fasciotomy          FAMILY HISTORY:  No pertinent family history in first degree relatives        Alochol: Denied  Smoking: Nonsmoker  Drug Use: Denied  Marital Status:         Allergies    No Known Allergies    Intolerances        MEDICATIONS  (STANDING):  aspirin enteric coated 81 milliGRAM(s) Oral daily  atorvastatin 20 milliGRAM(s) Oral at bedtime  cefTRIAXone   IVPB 1000 milliGRAM(s) IV Intermittent every 24 hours  gabapentin 300 milliGRAM(s) Oral two times a day  lacosamide 150 milliGRAM(s) Oral two times a day  multivitamin 1 Tablet(s) Oral daily  pantoprazole    Tablet 40 milliGRAM(s) Oral before breakfast  sodium chloride 0.9%. 1000 milliLiter(s) (50 mL/Hr) IV Continuous <Continuous>  tamsulosin 0.4 milliGRAM(s) Oral at bedtime    MEDICATIONS  (PRN):      ROS  No fever, sweats, chills  No epistaxis, HA, sore throat  No CP, SOB, cough, sputum  No n/v/d, abd pain, melena, hematochezia  No edema  No rash  No anxiety  No back pain, joint pain  No bleeding, bruising  No dysuria, hematuria    T(C): 37 (01-11-24 @ 13:26), Max: 37 (01-11-24 @ 13:26)  HR: 74 (01-11-24 @ 14:24) (63 - 83)  BP: 125/81 (01-11-24 @ 14:24) (108/67 - 150/78)  RR: 18 (01-11-24 @ 13:26) (18 - 19)  SpO2: 96% (01-11-24 @ 14:24) (96% - 100%)  Wt(kg): --    PE  NAD  Awake, alert  Anicteric, MMM  RRR  CTAB  Abd soft, NT, ND  No c/c/e  No rash grossly  FROM                          12.9   6.09  )-----------( 162      ( 11 Jan 2024 07:13 )             38.3       01-11    140  |  105  |  15  ----------------------------<  97  3.7   |  25  |  0.97    Ca    8.6      11 Jan 2024 07:13  Phos  3.1     01-11  Mg     2.1     01-11    TPro  6.8  /  Alb  3.9  /  TBili  0.3  /  DBili  x   /  AST  15  /  ALT  19  /  AlkPhos  66  01-11  ACC: [id number] EXAM:  DUPLEX SCAN EXT VEINS LOWER BI   ORDERED BY:    FAITH POLANCO     PROCEDURE DATE:  01/10/2024          INTERPRETATION:  CLINICAL INFORMATION: Lower extremity edema, rule out   DVT.    COMPARISON: 8/28/2023.    TECHNIQUE: Duplex sonography of the BILATERAL LOWER extremity veins with   color and spectral Doppler, with and without compression.    FINDINGS:    RIGHT:  Normal compressibility of the RIGHT common femoral, femoral and popliteal   veins.  Doppler examination shows normal spontaneous and phasic flow.  Thrombus in the right soleal vein and right peroneal vein.  Groin lymph nodes, largest measuring 1.9 x 0.8 x 0.8 cm.    LEFT:  Normal compressibility of the LEFT common femoral, femoral and popliteal   veins.  Doppler examination shows normal spontaneous and phasic flow.  Thrombus in the left soleal vein.  Groin lymph nodes, largest measuring 2.4 x 0.8 x 1.9 cm.    IMPRESSION:  Acute deep venous thrombosis: below the knee.    Thrombus in the bilateral soleal veins and right peroneal vein.    Findings were discussed with Dr. Trevizo 1/10/2024 6:55 AM by Dr. Heard with   read back confirmation.    --- End of Report ---    ACC: [id number] EXAM:  CT ABDOMEN AND PELVIS   ORDERED BY: RAYSHAWN ROMAN     PROCEDURE DATE:  01/10/2024          INTERPRETATION:  CLINICAL INFORMATION: Hematuria. Left flank pain.    COMPARISON: None.    CONTRAST/COMPLICATIONS:  IV Contrast: NONE  Oral Contrast: NONE  Complications: None reported at time of study completion    PROCEDURE:  CT of the Abdomen and Pelvis was performed.  Sagittal and coronal reformats were performed.    FINDINGS:  LOWER CHEST: Within normal limits.    LIVER: Enlarged measuring up to 22 cm.  BILE DUCTS: Normal caliber.  GALLBLADDER: Within normal limits.  SPLEEN: Within normal limits.  PANCREAS: Within normal limits.  ADRENALS: Within normal limits.  KIDNEYS/URETERS: There is mild left hydronephrosis with abrupt tapering   of the dilated renal collecting system at the level of the ureteropelvic   junction. Hyperdense material within the dilated left upper renal pole   collecting system most likely represents hemorrhage. No radiodense renal   calculi identified. Moderate left perinephric/periureteric stranding   noted, possibly reactive/inflammatory/or infectious in nature. A   retroaortic left renal vein.    BLADDER: Within normal limits.  REPRODUCTIVE ORGANS: Prostate is enlarged.    BOWEL: No bowel obstruction. Appendix is normal.  PERITONEUM: No ascites.  VESSELS: Within normal limits.  RETROPERITONEUM/LYMPH NODES: No lymphadenopathy.  ABDOMINAL WALL: Within normal limits.  BONES: Within normal limits.    IMPRESSION:    Mild left hydronephrosis with abrupt tapering at the ureteropelvic   junction. There is evidence of hemorrhage into the dilated left upper   renal pole collecting system and associated inflammatory changes   throughout the perinephric fat. No radiodense calculi identified. Urology   evaluation is advised.

## 2024-01-11 NOTE — PROGRESS NOTE ADULT - SUBJECTIVE AND OBJECTIVE BOX
Name of Patient : TAMI DUNHAM  MRN: [id number]  Date of visit: 01-11-24 @ 12:47      Subjective: Patient seen and examined. No new events except as noted.   Patient seen earlier this AM. Sitting up in bed. Denies chest pain, palpitations, shortness of breath or dyspnea.  Hematuria present, however improving.   AC remains on hold. Hgb overall stable.      REVIEW OF SYSTEMS:    CONSTITUTIONAL: Generalized weakness   EYES/ENT: No visual changes;  No vertigo or throat pain   NECK: No pain or stiffness  RESPIRATORY: No cough, wheezing, hemoptysis; No shortness of breath  CARDIOVASCULAR: No chest pain or palpitations  GASTROINTESTINAL: No abdominal or epigastric pain. No nausea, vomiting, or hematemesis; No diarrhea or constipation. No melena or hematochezia.  GENITOURINARY: +Hematuria with improvement   NEUROLOGICAL: + H/O CVA; Residual word finding difficulty  SKIN: + S/P L Fasciotomy; LE skin changes; + Upper thigh ecchymosis at Lovenox inj site; Denies itching   All other review of systems is negative unless indicated above.    MEDICATIONS:  MEDICATIONS  (STANDING):  aspirin enteric coated 81 milliGRAM(s) Oral daily  atorvastatin 20 milliGRAM(s) Oral at bedtime  cefTRIAXone   IVPB 1000 milliGRAM(s) IV Intermittent every 24 hours  cefTRIAXone   IVPB 2000 milliGRAM(s) IV Intermittent every 24 hours  gabapentin 300 milliGRAM(s) Oral two times a day  lacosamide 150 milliGRAM(s) Oral two times a day  multivitamin 1 Tablet(s) Oral daily  pantoprazole    Tablet 40 milliGRAM(s) Oral before breakfast  sodium chloride 0.9%. 1000 milliLiter(s) (50 mL/Hr) IV Continuous <Continuous>  tamsulosin 0.4 milliGRAM(s) Oral at bedtime      PHYSICAL EXAM:  T(C): 36.6 (01-11-24 @ 09:28), Max: 36.8 (01-10-24 @ 17:30)  HR: 80 (01-11-24 @ 09:28) (63 - 83)  BP: 117/80 (01-11-24 @ 09:28) (108/67 - 150/78)  RR: 19 (01-11-24 @ 09:28) (17 - 19)  SpO2: 99% (01-11-24 @ 09:28) (96% - 100%)  Wt(kg): --  I&O's Summary        Appearance: Awake, Sitting up in bed, in no acute distress   HEENT: Eyes are open   Lymphatic: No lymphadenopathy grossly   Cardiovascular: Normal S1 S2   Respiratory: normal effort, clear  Gastrointestinal:  Soft, Non-tender  Skin: + B/L thigh ecchymosis at Lovenox injection sites  Psychiatry:  Mood & affect appropriate  Musculoskeletal: + Prior LLE Fasciotomy; Chronic skin changes                            12.9   6.09  )-----------( 162      ( 11 Jan 2024 07:13 )             38.3               01-11    140  |  105  |  15  ----------------------------<  97  3.7   |  25  |  0.97    Ca    8.6      11 Jan 2024 07:13  Phos  3.1     01-11  Mg     2.1     01-11    TPro  6.8  /  Alb  3.9  /  TBili  0.3  /  DBili  x   /  AST  15  /  ALT  19  /  AlkPhos  66  01-11    PT/INR - ( 10 Gerardo 2024 00:31 )   PT: 11.2 sec;   INR: 1.02 ratio         PTT - ( 10 Gerardo 2024 00:31 )  PTT:37.0 sec                   Urinalysis Basic - ( 11 Jan 2024 07:13 )    Color: x / Appearance: x / SG: x / pH: x  Gluc: 97 mg/dL / Ketone: x  / Bili: x / Urobili: x   Blood: x / Protein: x / Nitrite: x   Leuk Esterase: x / RBC: x / WBC x   Sq Epi: x / Non Sq Epi: x / Bacteria: x          Culture - Urine (01.10.24 @ 00:41)   Specimen Source: Clean Catch Clean Catch (Midstream)  Culture Results: No growth         Name of Patient : TAMI DUNHAM  MRN: [id number]  Date of visit: 01-11-24 @ 12:47      Subjective: Patient seen and examined. No new events except as noted.   Patient seen earlier this AM. Sitting up in bed. Denies chest pain, palpitations, shortness of breath or dyspnea.  Hematuria present, however improving.   AC remains on hold. Hgb overall stable.      REVIEW OF SYSTEMS:    CONSTITUTIONAL: Generalized weakness   EYES/ENT: No visual changes;  No vertigo or throat pain   NECK: No pain or stiffness  RESPIRATORY: No cough, wheezing, hemoptysis; No shortness of breath  CARDIOVASCULAR: No chest pain or palpitations  GASTROINTESTINAL: No abdominal or epigastric pain. No nausea, vomiting, or hematemesis; No diarrhea or constipation. No melena or hematochezia.  GENITOURINARY: +Hematuria with improvement   NEUROLOGICAL: + H/O CVA; Residual word finding difficulty  SKIN: + S/P L Fasciotomy; LE skin changes; + Upper thigh ecchymosis at Lovenox inj site; Denies itching   All other review of systems is negative unless indicated above.    MEDICATIONS:  MEDICATIONS  (STANDING):  aspirin enteric coated 81 milliGRAM(s) Oral daily  atorvastatin 20 milliGRAM(s) Oral at bedtime  cefTRIAXone   IVPB 1000 milliGRAM(s) IV Intermittent every 24 hours  cefTRIAXone   IVPB 2000 milliGRAM(s) IV Intermittent every 24 hours  gabapentin 300 milliGRAM(s) Oral two times a day  lacosamide 150 milliGRAM(s) Oral two times a day  multivitamin 1 Tablet(s) Oral daily  pantoprazole    Tablet 40 milliGRAM(s) Oral before breakfast  sodium chloride 0.9%. 1000 milliLiter(s) (50 mL/Hr) IV Continuous <Continuous>  tamsulosin 0.4 milliGRAM(s) Oral at bedtime      PHYSICAL EXAM:  T(C): 36.6 (01-11-24 @ 09:28), Max: 36.8 (01-10-24 @ 17:30)  HR: 80 (01-11-24 @ 09:28) (63 - 83)  BP: 117/80 (01-11-24 @ 09:28) (108/67 - 150/78)  RR: 19 (01-11-24 @ 09:28) (17 - 19)  SpO2: 99% (01-11-24 @ 09:28) (96% - 100%)  Wt(kg): --  I&O's Summary        Appearance: Awake, Sitting up in bed, in no acute distress   HEENT: Eyes are open   Lymphatic: No lymphadenopathy grossly   Cardiovascular: Normal S1 S2   Respiratory: normal effort, clear  Gastrointestinal:  Soft, Non-tender  Skin: + B/L thigh ecchymosis at Lovenox injection sites  Psychiatry:  Mood & affect appropriate  Musculoskeletal: + Prior LLE Fasciotomy; Chronic skin changes                            12.9   6.09  )-----------( 162      ( 11 Jan 2024 07:13 )             38.3               01-11    140  |  105  |  15  ----------------------------<  97  3.7   |  25  |  0.97    Ca    8.6      11 Jan 2024 07:13  Phos  3.1     01-11  Mg     2.1     01-11    TPro  6.8  /  Alb  3.9  /  TBili  0.3  /  DBili  x   /  AST  15  /  ALT  19  /  AlkPhos  66  01-11    PT/INR - ( 10 Gerardo 2024 00:31 )   PT: 11.2 sec;   INR: 1.02 ratio         PTT - ( 10 Gerardo 2024 00:31 )  PTT:37.0 sec                   Urinalysis Basic - ( 11 Jan 2024 07:13 )    Color: x / Appearance: x / SG: x / pH: x  Gluc: 97 mg/dL / Ketone: x  / Bili: x / Urobili: x   Blood: x / Protein: x / Nitrite: x   Leuk Esterase: x / RBC: x / WBC x   Sq Epi: x / Non Sq Epi: x / Bacteria: x          Culture - Urine (01.10.24 @ 00:41)   Specimen Source: Clean Catch Clean Catch (Midstream)  Culture Results: No growth

## 2024-01-11 NOTE — PATIENT PROFILE ADULT - FALL HARM RISK - HARM RISK INTERVENTIONS
Assistance with ambulation/Assistance OOB with selected safe patient handling equipment/Communicate Risk of Fall with Harm to all staff/Discuss with provider need for PT consult/Monitor gait and stability/Provide patient with walking aids - walker, cane, crutches/Reinforce activity limits and safety measures with patient and family/Tailored Fall Risk Interventions/Visual Cue: Yellow wristband and red socks/Bed in lowest position, wheels locked, appropriate side rails in place/Call bell, personal items and telephone in reach/Instruct patient to call for assistance before getting out of bed or chair/Non-slip footwear when patient is out of bed/[place] to call system/Physically safe environment - no spills, clutter or unnecessary equipment/Purposeful Proactive Rounding/Room/bathroom lighting operational, light cord in reach Assistance with ambulation/Assistance OOB with selected safe patient handling equipment/Communicate Risk of Fall with Harm to all staff/Discuss with provider need for PT consult/Monitor gait and stability/Provide patient with walking aids - walker, cane, crutches/Reinforce activity limits and safety measures with patient and family/Tailored Fall Risk Interventions/Visual Cue: Yellow wristband and red socks/Bed in lowest position, wheels locked, appropriate side rails in place/Call bell, personal items and telephone in reach/Instruct patient to call for assistance before getting out of bed or chair/Non-slip footwear when patient is out of bed/[place] to call system/Physically safe environment - no spills, clutter or unnecessary equipment/Purposeful Proactive Rounding/Room/bathroom lighting operational, light cord in reach

## 2024-01-12 LAB
ANION GAP SERPL CALC-SCNC: 10 MMOL/L — SIGNIFICANT CHANGE UP (ref 5–17)
ANION GAP SERPL CALC-SCNC: 10 MMOL/L — SIGNIFICANT CHANGE UP (ref 5–17)
BUN SERPL-MCNC: 15 MG/DL — SIGNIFICANT CHANGE UP (ref 7–23)
BUN SERPL-MCNC: 15 MG/DL — SIGNIFICANT CHANGE UP (ref 7–23)
CALCIUM SERPL-MCNC: 8.6 MG/DL — SIGNIFICANT CHANGE UP (ref 8.4–10.5)
CALCIUM SERPL-MCNC: 8.6 MG/DL — SIGNIFICANT CHANGE UP (ref 8.4–10.5)
CHLORIDE SERPL-SCNC: 107 MMOL/L — SIGNIFICANT CHANGE UP (ref 96–108)
CHLORIDE SERPL-SCNC: 107 MMOL/L — SIGNIFICANT CHANGE UP (ref 96–108)
CO2 SERPL-SCNC: 23 MMOL/L — SIGNIFICANT CHANGE UP (ref 22–31)
CO2 SERPL-SCNC: 23 MMOL/L — SIGNIFICANT CHANGE UP (ref 22–31)
CREAT SERPL-MCNC: 0.96 MG/DL — SIGNIFICANT CHANGE UP (ref 0.5–1.3)
CREAT SERPL-MCNC: 0.96 MG/DL — SIGNIFICANT CHANGE UP (ref 0.5–1.3)
EGFR: 95 ML/MIN/1.73M2 — SIGNIFICANT CHANGE UP
EGFR: 95 ML/MIN/1.73M2 — SIGNIFICANT CHANGE UP
GLUCOSE SERPL-MCNC: 91 MG/DL — SIGNIFICANT CHANGE UP (ref 70–99)
GLUCOSE SERPL-MCNC: 91 MG/DL — SIGNIFICANT CHANGE UP (ref 70–99)
HCT VFR BLD CALC: 35.3 % — LOW (ref 39–50)
HCT VFR BLD CALC: 35.3 % — LOW (ref 39–50)
HGB BLD-MCNC: 11.6 G/DL — LOW (ref 13–17)
HGB BLD-MCNC: 11.6 G/DL — LOW (ref 13–17)
MAGNESIUM SERPL-MCNC: 2.2 MG/DL — SIGNIFICANT CHANGE UP (ref 1.6–2.6)
MAGNESIUM SERPL-MCNC: 2.2 MG/DL — SIGNIFICANT CHANGE UP (ref 1.6–2.6)
MCHC RBC-ENTMCNC: 29.7 PG — SIGNIFICANT CHANGE UP (ref 27–34)
MCHC RBC-ENTMCNC: 29.7 PG — SIGNIFICANT CHANGE UP (ref 27–34)
MCHC RBC-ENTMCNC: 32.9 GM/DL — SIGNIFICANT CHANGE UP (ref 32–36)
MCHC RBC-ENTMCNC: 32.9 GM/DL — SIGNIFICANT CHANGE UP (ref 32–36)
MCV RBC AUTO: 90.5 FL — SIGNIFICANT CHANGE UP (ref 80–100)
MCV RBC AUTO: 90.5 FL — SIGNIFICANT CHANGE UP (ref 80–100)
NRBC # BLD: 0 /100 WBCS — SIGNIFICANT CHANGE UP (ref 0–0)
NRBC # BLD: 0 /100 WBCS — SIGNIFICANT CHANGE UP (ref 0–0)
PHOSPHATE SERPL-MCNC: 3.3 MG/DL — SIGNIFICANT CHANGE UP (ref 2.5–4.5)
PHOSPHATE SERPL-MCNC: 3.3 MG/DL — SIGNIFICANT CHANGE UP (ref 2.5–4.5)
PLATELET # BLD AUTO: 159 K/UL — SIGNIFICANT CHANGE UP (ref 150–400)
PLATELET # BLD AUTO: 159 K/UL — SIGNIFICANT CHANGE UP (ref 150–400)
POTASSIUM SERPL-MCNC: 3.9 MMOL/L — SIGNIFICANT CHANGE UP (ref 3.5–5.3)
POTASSIUM SERPL-MCNC: 3.9 MMOL/L — SIGNIFICANT CHANGE UP (ref 3.5–5.3)
POTASSIUM SERPL-SCNC: 3.9 MMOL/L — SIGNIFICANT CHANGE UP (ref 3.5–5.3)
POTASSIUM SERPL-SCNC: 3.9 MMOL/L — SIGNIFICANT CHANGE UP (ref 3.5–5.3)
RBC # BLD: 3.9 M/UL — LOW (ref 4.2–5.8)
RBC # BLD: 3.9 M/UL — LOW (ref 4.2–5.8)
RBC # FLD: 12.9 % — SIGNIFICANT CHANGE UP (ref 10.3–14.5)
RBC # FLD: 12.9 % — SIGNIFICANT CHANGE UP (ref 10.3–14.5)
SODIUM SERPL-SCNC: 140 MMOL/L — SIGNIFICANT CHANGE UP (ref 135–145)
SODIUM SERPL-SCNC: 140 MMOL/L — SIGNIFICANT CHANGE UP (ref 135–145)
WBC # BLD: 5.56 K/UL — SIGNIFICANT CHANGE UP (ref 3.8–10.5)
WBC # BLD: 5.56 K/UL — SIGNIFICANT CHANGE UP (ref 3.8–10.5)
WBC # FLD AUTO: 5.56 K/UL — SIGNIFICANT CHANGE UP (ref 3.8–10.5)
WBC # FLD AUTO: 5.56 K/UL — SIGNIFICANT CHANGE UP (ref 3.8–10.5)

## 2024-01-12 PROCEDURE — 93970 EXTREMITY STUDY: CPT | Mod: 26

## 2024-01-12 PROCEDURE — 99232 SBSQ HOSP IP/OBS MODERATE 35: CPT

## 2024-01-12 RX ORDER — ENOXAPARIN SODIUM 100 MG/ML
90 INJECTION SUBCUTANEOUS EVERY 12 HOURS
Refills: 0 | Status: DISCONTINUED | OUTPATIENT
Start: 2024-01-12 | End: 2024-01-13

## 2024-01-12 RX ORDER — ENOXAPARIN SODIUM 100 MG/ML
40 INJECTION SUBCUTANEOUS EVERY 24 HOURS
Refills: 0 | Status: DISCONTINUED | OUTPATIENT
Start: 2024-01-12 | End: 2024-01-12

## 2024-01-12 RX ORDER — SENNA PLUS 8.6 MG/1
2 TABLET ORAL ONCE
Refills: 0 | Status: COMPLETED | OUTPATIENT
Start: 2024-01-12 | End: 2024-01-12

## 2024-01-12 RX ADMIN — ATORVASTATIN CALCIUM 20 MILLIGRAM(S): 80 TABLET, FILM COATED ORAL at 20:51

## 2024-01-12 RX ADMIN — CEFTRIAXONE 100 MILLIGRAM(S): 500 INJECTION, POWDER, FOR SOLUTION INTRAMUSCULAR; INTRAVENOUS at 06:07

## 2024-01-12 RX ADMIN — Medication 1 TABLET(S): at 14:32

## 2024-01-12 RX ADMIN — SODIUM CHLORIDE 50 MILLILITER(S): 9 INJECTION INTRAMUSCULAR; INTRAVENOUS; SUBCUTANEOUS at 14:32

## 2024-01-12 RX ADMIN — TAMSULOSIN HYDROCHLORIDE 0.4 MILLIGRAM(S): 0.4 CAPSULE ORAL at 20:51

## 2024-01-12 RX ADMIN — PANTOPRAZOLE SODIUM 40 MILLIGRAM(S): 20 TABLET, DELAYED RELEASE ORAL at 06:07

## 2024-01-12 RX ADMIN — Medication 81 MILLIGRAM(S): at 14:32

## 2024-01-12 RX ADMIN — LACOSAMIDE 150 MILLIGRAM(S): 50 TABLET ORAL at 17:16

## 2024-01-12 RX ADMIN — LACOSAMIDE 150 MILLIGRAM(S): 50 TABLET ORAL at 06:07

## 2024-01-12 RX ADMIN — ENOXAPARIN SODIUM 90 MILLIGRAM(S): 100 INJECTION SUBCUTANEOUS at 17:16

## 2024-01-12 RX ADMIN — GABAPENTIN 300 MILLIGRAM(S): 400 CAPSULE ORAL at 06:07

## 2024-01-12 RX ADMIN — SENNA PLUS 2 TABLET(S): 8.6 TABLET ORAL at 21:11

## 2024-01-12 RX ADMIN — GABAPENTIN 300 MILLIGRAM(S): 400 CAPSULE ORAL at 17:16

## 2024-01-12 NOTE — DIETITIAN INITIAL EVALUATION ADULT - PERTINENT MEDS FT
MEDICATIONS  (STANDING):  aspirin enteric coated 81 milliGRAM(s) Oral daily  atorvastatin 20 milliGRAM(s) Oral at bedtime  cefTRIAXone   IVPB 1000 milliGRAM(s) IV Intermittent every 24 hours  enoxaparin Injectable 90 milliGRAM(s) SubCutaneous every 12 hours  gabapentin 300 milliGRAM(s) Oral two times a day  lacosamide 150 milliGRAM(s) Oral two times a day  multivitamin 1 Tablet(s) Oral daily  pantoprazole    Tablet 40 milliGRAM(s) Oral before breakfast  sodium chloride 0.9%. 1000 milliLiter(s) (50 mL/Hr) IV Continuous <Continuous>  tamsulosin 0.4 milliGRAM(s) Oral at bedtime    MEDICATIONS  (PRN):

## 2024-01-12 NOTE — DIETITIAN INITIAL EVALUATION ADULT - REASON
Nutrition Focused Physical Exam deferred at this time; Pt appears well developed with no overt signs of muscle or fat depletion.

## 2024-01-12 NOTE — DIETITIAN INITIAL EVALUATION ADULT - ORAL INTAKE PTA/DIET HISTORY
Pt reports having a good appetite and PO intake PTA; consuming >75% of most meals; reports having 2 meals a day at baseline. Follows regular diet. Pt denies any known food allergies or intolerances. Confirms use of multivitamin and Boost shakes at home. Denies any difficulty chewing/swallowing at this time.

## 2024-01-12 NOTE — DIETITIAN INITIAL EVALUATION ADULT - PHYSCIAL ASSESSMENT
Weight Hx Per:  - Source: patient   - UBW: 220 pounds   - Reported weight changes: Pt endorses weight loss and weight re-gain. States weighing 220 pounds in December 2022 with subsequent 40 pound weight loss in the summer, now regaining weight towards UBW.     [facility]:  - [facility] (1/6/2023)  - 220 pounds (5/5/2023)  - 216 pounds (10/12/2023)    Current Admission Weights:  - Dosing weight: 210 pounds/95.3 kg (1/11)    Weight Change:  - weight fluctuations noted, will continue to monitor weight trends as available/able.     IBW: 196 pounds   %IBW: 107% Weight Hx Per:  - Source: patient   - UBW: 220 pounds   - Reported weight changes: Pt endorses weight loss and weight re-gain. States weighing 220 pounds in December 2022 with subsequent 40 pound weight loss in the summer, now regaining weight towards UBW.     [facility]  - [facility] (1/6/2023)  - 220 pounds (5/5/2023)  - 216 pounds (10/12/2023)    Current Admission Weights:  - Dosing weight: 210 pounds/95.3 kg (1/11)    Weight Change:  - weight fluctuations noted, will continue to monitor weight trends as available/able.     IBW: 196 pounds   %IBW: 107%

## 2024-01-12 NOTE — DIETITIAN INITIAL EVALUATION ADULT - NSFNSGIIOFT_GEN_A_CORE
- Pt denies nausea, vomiting, diarrhea, or constipation.   - Last BM: 1/11; not currently ordered for bowel regimen   01-11-24 @ 07:01  -  01-12-24 @ 07:00  --------------------------------------------------------  OUT:    Stool (mL): 0 mL  Total OUT: 0 mL    Total NET: 0 mL

## 2024-01-12 NOTE — PROGRESS NOTE ADULT - SUBJECTIVE AND OBJECTIVE BOX
Surgery Progress Note    S: Patient seen and examined. No acute events overnight.     O:  PHYSICAL EXAM:  Gen: NAD  CV: Regular rate  Resp: Nonlabored breathing on room air  Ext:  - Bilateral lower extremities are warm and well perfused  - Well healed LLE fasciotomy sites  - Bilateral lower extremity compartments are compressible  - Bilateral lower extremities without evidence of vascular compromise/phlegmasia  Vasc: Bilateral DP and PT pulses are palpable    Vital Signs Last 24 Hrs  T(C): 36.5 (12 Jan 2024 04:32), Max: 37 (11 Jan 2024 13:26)  T(F): 97.7 (12 Jan 2024 04:32), Max: 98.6 (11 Jan 2024 13:26)  HR: 70 (12 Jan 2024 04:32) (66 - 83)  BP: 111/66 (12 Jan 2024 04:32) (104/68 - 125/81)  BP(mean): --  RR: 18 (12 Jan 2024 04:32) (18 - 19)  SpO2: 95% (12 Jan 2024 04:32) (95% - 100%)    Parameters below as of 12 Jan 2024 04:32  Patient On (Oxygen Delivery Method): room air        I&O's Detail    11 Jan 2024 07:01  -  12 Jan 2024 07:00  --------------------------------------------------------  IN:  Total IN: 0 mL    OUT:    Stool (mL): 0 mL    Voided (mL): 2000 mL  Total OUT: 2000 mL    Total NET: -2000 mL                                11.6   5.56  )-----------( 159      ( 12 Jan 2024 06:41 )             35.3       01-12    140  |  107  |  15  ----------------------------<  91  3.9   |  23  |  0.96    Ca    8.6      12 Jan 2024 06:39  Phos  3.3     01-12  Mg     2.2     01-12    TPro  6.8  /  Alb  3.9  /  TBili  0.3  /  DBili  x   /  AST  15  /  ALT  19  /  AlkPhos  66  01-11

## 2024-01-12 NOTE — PROGRESS NOTE ADULT - SUBJECTIVE AND OBJECTIVE BOX
Subjective  Patient seen and examined. Resting comfortably.    Objective    Vital signs  T(F): , Max: 98.6 (01-11-24 @ 13:26)  HR: 70 (01-12-24 @ 04:32)  BP: 111/66 (01-12-24 @ 04:32)  SpO2: 95% (01-12-24 @ 04:32)  Wt(kg): --    Output     OUT:    Voided (mL): 2000 mL  Total OUT: 2000 mL    Total NET: -2000 mL      Gen: NAD  Abd: soft, nontender, nondistended  : Voiding clear yellow urine    Labs      01-12 @ 06:41    WBC 5.56  / Hct 35.3  / SCr --       01-12 @ 06:39    WBC --    / Hct --    / SCr 0.96         Culture - Urine (collected 01-10-24 @ 07:39)  Source: Clean Catch Clean Catch (Midstream)  Final Report (01-11-24 @ 14:08):    <10,000 CFU/mL Normal Urogenital Narda    Culture - Urine (collected 01-10-24 @ 00:41)  Source: Clean Catch Clean Catch (Midstream)  Final Report (01-11-24 @ 08:38):    No growth

## 2024-01-12 NOTE — DIETITIAN INITIAL EVALUATION ADULT - PERTINENT LABORATORY DATA
01-12    140  |  107  |  15  ----------------------------<  91  3.9   |  23  |  0.96    Ca    8.6      12 Jan 2024 06:39  Phos  3.3     01-12  Mg     2.2     01-12    TPro  6.8  /  Alb  3.9  /  TBili  0.3  /  DBili  x   /  AST  15  /  ALT  19  /  AlkPhos  66  01-11

## 2024-01-12 NOTE — PROGRESS NOTE ADULT - SUBJECTIVE AND OBJECTIVE BOX
Name of Patient : TAMI DUNHAM  MRN: [id number]  Date of visit: 01-12-24       Subjective: Patient seen and examined. No new events except as noted.   doing okay  hgb stable, urine cleared     REVIEW OF SYSTEMS:    CONSTITUTIONAL: No weakness, fevers or chills  EYES/ENT: No visual changes;  No vertigo or throat pain   NECK: No pain or stiffness  RESPIRATORY: No cough, wheezing, hemoptysis; No shortness of breath  CARDIOVASCULAR: No chest pain or palpitations  GASTROINTESTINAL: No abdominal or epigastric pain. No nausea, vomiting, or hematemesis; No diarrhea or constipation. No melena or hematochezia.  GENITOURINARY: No dysuria, frequency or hematuria  NEUROLOGICAL: No numbness or weakness  SKIN: No itching, burning, rashes, or lesions   All other review of systems is negative unless indicated above.    MEDICATIONS:  MEDICATIONS  (STANDING):  aspirin enteric coated 81 milliGRAM(s) Oral daily  atorvastatin 20 milliGRAM(s) Oral at bedtime  cefTRIAXone   IVPB 1000 milliGRAM(s) IV Intermittent every 24 hours  enoxaparin Injectable 90 milliGRAM(s) SubCutaneous every 12 hours  gabapentin 300 milliGRAM(s) Oral two times a day  lacosamide 150 milliGRAM(s) Oral two times a day  multivitamin 1 Tablet(s) Oral daily  pantoprazole    Tablet 40 milliGRAM(s) Oral before breakfast  sodium chloride 0.9%. 1000 milliLiter(s) (50 mL/Hr) IV Continuous <Continuous>  tamsulosin 0.4 milliGRAM(s) Oral at bedtime      PHYSICAL EXAM:  T(C): 36.2 (01-12-24 @ 21:26), Max: 36.6 (01-12-24 @ 14:27)  HR: 77 (01-12-24 @ 21:26) (70 - 77)  BP: 129/87 (01-12-24 @ 21:26) (111/66 - 129/87)  RR: 18 (01-12-24 @ 21:26) (18 - 18)  SpO2: 97% (01-12-24 @ 21:26) (95% - 97%)  Wt(kg): --  I&O's Summary    11 Jan 2024 07:01  -  12 Jan 2024 07:00  --------------------------------------------------------  IN: 0 mL / OUT: 2000 mL / NET: -2000 mL    12 Jan 2024 07:01  -  12 Jan 2024 22:22  --------------------------------------------------------  IN: 600 mL / OUT: 0 mL / NET: 600 mL          Appearance: Normal	  HEENT:  PERRLA   Lymphatic: No lymphadenopathy   Cardiovascular: Normal S1 S2, no JVD  Respiratory: normal effort , clear  Gastrointestinal:  Soft, Non-tender  Skin: No rashes,  warm to touch  Psychiatry:  Mood & affect appropriate  Musculuskeletal: No edema    recent labs, Imaging and EKGs personally reviewed     01-11-24 @ 07:01  -  01-12-24 @ 07:00  --------------------------------------------------------  IN: 0 mL / OUT: 2000 mL / NET: -2000 mL    01-12-24 @ 07:01 - 01-12-24 @ 22:22  --------------------------------------------------------  IN: 600 mL / OUT: 0 mL / NET: 600 mL                          11.6   5.56  )-----------( 159      ( 12 Jan 2024 06:41 )             35.3               01-12    140  |  107  |  15  ----------------------------<  91  3.9   |  23  |  0.96    Ca    8.6      12 Jan 2024 06:39  Phos  3.3     01-12  Mg     2.2     01-12    TPro  6.8  /  Alb  3.9  /  TBili  0.3  /  DBili  x   /  AST  15  /  ALT  19  /  AlkPhos  66  01-11                       Urinalysis Basic - ( 12 Jan 2024 06:39 )    Color: x / Appearance: x / SG: x / pH: x  Gluc: 91 mg/dL / Ketone: x  / Bili: x / Urobili: x   Blood: x / Protein: x / Nitrite: x   Leuk Esterase: x / RBC: x / WBC x   Sq Epi: x / Non Sq Epi: x / Bacteria: x               Name of Patient : TAMI DUNHAM  MRN: [id number]  Date of visit: 01-12-24       Subjective: Patient seen and examined. No new events except as noted.   doing okay  hgb stable, urine cleared     REVIEW OF SYSTEMS:    CONSTITUTIONAL: No weakness, fevers or chills  EYES/ENT: No visual changes;  No vertigo or throat pain   NECK: No pain or stiffness  RESPIRATORY: No cough, wheezing, hemoptysis; No shortness of breath  CARDIOVASCULAR: No chest pain or palpitations  GASTROINTESTINAL: No abdominal or epigastric pain. No nausea, vomiting, or hematemesis; No diarrhea or constipation. No melena or hematochezia.  GENITOURINARY: No dysuria, frequency or hematuria  NEUROLOGICAL: No numbness or weakness  SKIN: No itching, burning, rashes, or lesions   All other review of systems is negative unless indicated above.    MEDICATIONS:  MEDICATIONS  (STANDING):  aspirin enteric coated 81 milliGRAM(s) Oral daily  atorvastatin 20 milliGRAM(s) Oral at bedtime  cefTRIAXone   IVPB 1000 milliGRAM(s) IV Intermittent every 24 hours  enoxaparin Injectable 90 milliGRAM(s) SubCutaneous every 12 hours  gabapentin 300 milliGRAM(s) Oral two times a day  lacosamide 150 milliGRAM(s) Oral two times a day  multivitamin 1 Tablet(s) Oral daily  pantoprazole    Tablet 40 milliGRAM(s) Oral before breakfast  sodium chloride 0.9%. 1000 milliLiter(s) (50 mL/Hr) IV Continuous <Continuous>  tamsulosin 0.4 milliGRAM(s) Oral at bedtime      PHYSICAL EXAM:  T(C): 36.2 (01-12-24 @ 21:26), Max: 36.6 (01-12-24 @ 14:27)  HR: 77 (01-12-24 @ 21:26) (70 - 77)  BP: 129/87 (01-12-24 @ 21:26) (111/66 - 129/87)  RR: 18 (01-12-24 @ 21:26) (18 - 18)  SpO2: 97% (01-12-24 @ 21:26) (95% - 97%)  Wt(kg): --  I&O's Summary    11 Jan 2024 07:01  -  12 Jan 2024 07:00  --------------------------------------------------------  IN: 0 mL / OUT: 2000 mL / NET: -2000 mL    12 Jan 2024 07:01  -  12 Jan 2024 22:22  --------------------------------------------------------  IN: 600 mL / OUT: 0 mL / NET: 600 mL          Appearance: Normal	  HEENT:  PERRLA   Lymphatic: No lymphadenopathy   Cardiovascular: Normal S1 S2, no JVD  Respiratory: normal effort , clear  Gastrointestinal:  Soft, Non-tender  Skin: No rashes,  warm to touch  Psychiatry:  Mood & affect appropriate  Musculuskeletal: No edema    recent labs, Imaging and EKGs personally reviewed     01-11-24 @ 07:01  -  01-12-24 @ 07:00  --------------------------------------------------------  IN: 0 mL / OUT: 2000 mL / NET: -2000 mL    01-12-24 @ 07:01 - 01-12-24 @ 22:22  --------------------------------------------------------  IN: 600 mL / OUT: 0 mL / NET: 600 mL                          11.6   5.56  )-----------( 159      ( 12 Jan 2024 06:41 )             35.3               01-12    140  |  107  |  15  ----------------------------<  91  3.9   |  23  |  0.96    Ca    8.6      12 Jan 2024 06:39  Phos  3.3     01-12  Mg     2.2     01-12    TPro  6.8  /  Alb  3.9  /  TBili  0.3  /  DBili  x   /  AST  15  /  ALT  19  /  AlkPhos  66  01-11                       Urinalysis Basic - ( 12 Jan 2024 06:39 )    Color: x / Appearance: x / SG: x / pH: x  Gluc: 91 mg/dL / Ketone: x  / Bili: x / Urobili: x   Blood: x / Protein: x / Nitrite: x   Leuk Esterase: x / RBC: x / WBC x   Sq Epi: x / Non Sq Epi: x / Bacteria: x

## 2024-01-12 NOTE — PROGRESS NOTE ADULT - ASSESSMENT
53 year old male with bilateral lower extremity below knee DVTs. No limb threatening symptoms at this time - no phlegmasia or vascular compromise. No symptoms or physical exam findings of PAD. Has history of LLE DVT causing compartment syndrome requiring fasciotomy. On AC at home (Lovenox managed by hematology). AC on hold in setting of hematuria.    Plan:  - Resume AC when able at discretion of primary team, recommend holding AC if having hematuria  - No acute vascular surgery interventions at this time  - Repeat b/l LUE duplex in 1 week to assess for below knee DVT propagation  - Vascular surgery will follow    Vascular Surgery [phone] 53 year old male with bilateral lower extremity below knee DVTs. No limb threatening symptoms at this time - no phlegmasia or vascular compromise. No symptoms or physical exam findings of PAD. Has history of LLE DVT causing compartment syndrome requiring fasciotomy. On AC at home (Lovenox managed by hematology). AC on hold in setting of hematuria.    Plan:  - Resume AC when able at discretion of primary team, recommend holding AC if having hematuria  - No acute vascular surgery interventions at this time  - Repeat b/l LUE duplex in 1 week to assess for below knee DVT propagation  - Vascular surgery will follow    Vascular Surgery [phone] 53 year old male with bilateral lower extremity below knee DVTs. No limb threatening symptoms at this time - no phlegmasia or vascular compromise. No symptoms or physical exam findings of PAD. Has history of LLE DVT causing compartment syndrome requiring fasciotomy. On AC at home (Lovenox managed by hematology). AC on hold in setting of hematuria.    Plan:  - Resume AC when able at discretion of primary team, recommend holding AC if having hematuria  - No acute vascular surgery interventions at this time  - Repeat b/l LE duplex in 1 week to assess for below knee DVT propagation  - Vascular surgery will follow    Vascular Surgery [phone] 53 year old male with bilateral lower extremity below knee DVTs. No limb threatening symptoms at this time - no phlegmasia or vascular compromise. No symptoms or physical exam findings of PAD. Has history of LLE DVT causing compartment syndrome requiring fasciotomy. On AC at home (Lovenox managed by hematology). AC on hold in setting of hematuria.    Plan:  - Resume AC when able at discretion of primary team, recommend holding AC if having hematuria  - No acute vascular surgery interventions at this time  - Repeat b/l LE duplex in 1 week to assess for below knee DVT propagation  - Vascular surgery will follow    Vascular Surgery [phone]

## 2024-01-12 NOTE — DIETITIAN INITIAL EVALUATION ADULT - ADD RECOMMEND
1) Continue current diet order: regular diet   2) Recommend Ensure Plus BID (chocolate)   3) Monitor and encourage PO intake. Encourage use of daily menus. Honor dietary preferences as expressed as able.   4) Monitor weights, labs, hydration status, bowels, and skin integrity.

## 2024-01-12 NOTE — PROGRESS NOTE ADULT - ASSESSMENT
Patient is a 53 year old male with a PMHx CVA with residual word finding difficulty, bilateral DVTs on chronic Lovenox SQ (150Qd), compartment syndrome status post left fasciotomy, HLD, PFO, vertigo, seizure disorder who presents to [facility] with a chief complaint of 4-5 days of gross hematuria of dark red color with occasional passage of clots associated with suprapubic and left sided flank pain. Patient reports that when his hematuria began, he attributed it to increase intake of cranberry juice. Patient states that when his hematuria did not subside, he presented to his outpatient Urologist's office for which he was started on Cipro for UTI and only took one dose. Patient also endorses left flank pain. Patient denies history of nephrolithiasis or previous episodes of hematuria. Patient denies dysuria, frequency or urgency, chills, body aches, nausea, vomiting, headache, lightheadedness or dizziness.       Hematuria   - Pt reports last dose of Lovenox injection was 11/09 AM prior to arrival   - Hgb stable, however with gross hematuria and hemorrhage seen on CT   - Hold Lovenox for now -->  Resume when able to   - Check CBC BID - Monitor H/H and Hgb closely --> F/u Repeat CBC , very mild drop , hematuria fully resolved   - Urology evaluation appreciated; F/u recs  - start lovenox BID full dose tonight an dmonitor hgb level     UTI   - With associated flank pain. UA grossly positive. Started on Rocephin 2g Q24 hours for now  - F/u UCx --> negative   - IVF for hydration   - C/w Rocephin, 1g Qd   - Monitor CBC, Temp Curve, VS and patient closely     Hydronephrosis   - CT w/ mild L hydronephrosis with abrupt tapering of the dilated renal collecting system at the level of the UPJ, Hyperdense material within the dilated L upper renal pole most likely representing hemorrhage, Moderate L Perinephric/ periureteric stranding, no calculi identified   - C/w Tamsulosin; Monitor I and O   - As per Urology, patient will require close outpatient follow up and work up for UPJ obstruction   - Urology eval appreciated; F/u recs    Hemorrhage   - Seen on CT as above  - Check serial CBC BID   - Monitor H/H closely. Transfuse for Hgb < 7.0   - Urology eval appreciated; F/u recs    DVT  - LE Duplex w/ B/L soleal vein and right peroneal vein thrombus, Acute DVT below the knee    - On Lovenox for  Mg BID, now on hold in the setting of hematuria. Resume as able to   - Vascular Cardiology eval appreciated; F/u recs --> Planned for surveillance duplex to assess for propagation on 1/12   - Hematology consulted; F/u recs     Seizure   - Per history   - Have asked pharmacy to obtain Med Rec for home medications   - Resume home Vimpat 150 BID as per Neuro.  - Per Neurology, patient is no longer on Keppra. Will DC   - Seizure precautions  - Neuro eval appreciated; F/u recs    CVA  - Per History   - On ASA, Lovenox and Statin as home --> C/w ASA and Statin   - Lovenox on hold for now in view of hematuria.   - C/w Statin therapy   - Neuro eval appreciated     ADHD  - Vyvanse 50 Mg not available in patient. Resume on DC  - Fall, Seizure, Aspiration precautions  - Neuro eval appreciated; F/u recs     LLE Fasciotomy   - 2/2 compartment syndrome.   - Vascular surgery eval appreciated; F/u recs --> No acute intervention at this time     HLD  - Lipid panel noted; C/w Lipitor 20    Vertigo  - Per History. No longer on Meclizine per patient  - Fall, Seizure and Aspiration precautions   - Ambulate with Assistance  - PT eval     PPX  - Resume Lovenox as able to   - PPI          Patient is a 53 year old male with a PMHx CVA with residual word finding difficulty, bilateral DVTs on chronic Lovenox SQ (150Qd), compartment syndrome status post left fasciotomy, HLD, PFO, vertigo, seizure disorder who presents to [facility] with a chief complaint of 4-5 days of gross hematuria of dark red color with occasional passage of clots associated with suprapubic and left sided flank pain. Patient reports that when his hematuria began, he attributed it to increase intake of cranberry juice. Patient states that when his hematuria did not subside, he presented to his outpatient Urologist's office for which he was started on Cipro for UTI and only took one dose. Patient also endorses left flank pain. Patient denies history of nephrolithiasis or previous episodes of hematuria. Patient denies dysuria, frequency or urgency, chills, body aches, nausea, vomiting, headache, lightheadedness or dizziness.       Hematuria   - Pt reports last dose of Lovenox injection was 11/09 AM prior to arrival   - Hgb stable, however with gross hematuria and hemorrhage seen on CT   - Hold Lovenox for now -->  Resume when able to   - Check CBC BID - Monitor H/H and Hgb closely --> F/u Repeat CBC , very mild drop , hematuria fully resolved   - Urology evaluation appreciated; F/u recs  - start lovenox BID full dose tonight an dmonitor hgb level     UTI   - With associated flank pain. UA grossly positive. Started on Rocephin 2g Q24 hours for now  - F/u UCx --> negative   - IVF for hydration   - C/w Rocephin, 1g Qd   - Monitor CBC, Temp Curve, VS and patient closely     Hydronephrosis   - CT w/ mild L hydronephrosis with abrupt tapering of the dilated renal collecting system at the level of the UPJ, Hyperdense material within the dilated L upper renal pole most likely representing hemorrhage, Moderate L Perinephric/ periureteric stranding, no calculi identified   - C/w Tamsulosin; Monitor I and O   - As per Urology, patient will require close outpatient follow up and work up for UPJ obstruction   - Urology eval appreciated; F/u recs    Hemorrhage   - Seen on CT as above  - Check serial CBC BID   - Monitor H/H closely. Transfuse for Hgb < 7.0   - Urology eval appreciated; F/u recs    DVT  - LE Duplex w/ B/L soleal vein and right peroneal vein thrombus, Acute DVT below the knee    - On Lovenox for  Mg BID, now on hold in the setting of hematuria. Resume as able to   - Vascular Cardiology eval appreciated; F/u recs --> Planned for surveillance duplex to assess for propagation on 1/12   - Hematology consulted; F/u recs     Seizure   - Per history   - Have asked pharmacy to obtain Med Rec for home medications   - Resume home Vimpat 150 BID as per Neuro.  - Per Neurology, patient is no longer on Keppra. Will DC   - Seizure precautions  - Neuro eval appreciated; F/u recs    CVA  - Per History   - On ASA, Lovenox and Statin as home --> C/w ASA and Statin   - Lovenox on hold for now in view of hematuria.   - C/w Statin therapy   - Neuro eval appreciated     ADHD  - Vyvanse 50 Mg not available in patient. Resume on DC  - Fall, Seizure, Aspiration precautions  - Neuro eval appreciated; F/u recs     LLE Fasciotomy   - 2/2 compartment syndrome.   - Vascular surgery eval appreciated; F/u recs --> No acute intervention at this time     HLD  - Lipid panel noted; C/w Lipitor 20    Vertigo  - Per History. No longer on Meclizine per patient  - Fall, Seizure and Aspiration precautions   - Ambulate with Assistance  - PT eval     PPX  - Resume Lovenox as able to   - PPI

## 2024-01-12 NOTE — PROGRESS NOTE ADULT - ASSESSMENT
53y Male PMH CVA with residual word finding difficulty, bilateral DVTs on chronic Lovenox SQ and ASA81 (last dose yesterday), compartment syndrome status post left fasciotomy, HLD, PFO, vertigo, seizure disorder who presents to [facility] ED on 1/9/2024 for gross hematuria and intermittent suprapubic and L flank pain. CT abd pelvis showed mild L hydro to the level of the UPJ with associated blood products in the collecting system. Labs showed WBC 13.60, Hct 42.5, Cr 1.14. UA pending. PVR 55cc.    Recs:  -urine culture negtative  -trend h/h - stable  -continue hydration and monitor urine color (now clear)  -AC plan per primary  -patient will ultimately need close outpatient follow up/work up for UPJ obstruction  -Follow up outpatient after discharge with Dr. Ivey within 2 weeks  -Please reconsult urology as needed  -Discussed with Dr. Ivey    [facility]  [address] [place] [id number] [phone]   53y Male PMH CVA with residual word finding difficulty, bilateral DVTs on chronic Lovenox SQ and ASA81 (last dose yesterday), compartment syndrome status post left fasciotomy, HLD, PFO, vertigo, seizure disorder who presents to [facility] ED on 1/9/2024 for gross hematuria and intermittent suprapubic and L flank pain. CT abd pelvis showed mild L hydro to the level of the UPJ with associated blood products in the collecting system. Labs showed WBC 13.60, Hct 42.5, Cr 1.14. UA pending. PVR 55cc.    Recs:  -urine culture negtative  -trend h/h - stable  -continue hydration and monitor urine color (now clear)  -AC plan per primary  -patient will ultimately need close outpatient follow up/work up for UPJ obstruction  -Follow up outpatient after discharge with Dr. Ivey within 2 weeks  -Please reconsult urology as needed  -Discussed with Dr. Ivey    [facility]  [address] [place] [id number] [phone]

## 2024-01-13 ENCOUNTER — TRANSCRIPTION ENCOUNTER (OUTPATIENT)
Age: 54
End: 2024-01-13

## 2024-01-13 LAB
ANION GAP SERPL CALC-SCNC: 10 MMOL/L — SIGNIFICANT CHANGE UP (ref 5–17)
ANION GAP SERPL CALC-SCNC: 10 MMOL/L — SIGNIFICANT CHANGE UP (ref 5–17)
BUN SERPL-MCNC: 13 MG/DL — SIGNIFICANT CHANGE UP (ref 7–23)
BUN SERPL-MCNC: 13 MG/DL — SIGNIFICANT CHANGE UP (ref 7–23)
CALCIUM SERPL-MCNC: 8.3 MG/DL — LOW (ref 8.4–10.5)
CALCIUM SERPL-MCNC: 8.3 MG/DL — LOW (ref 8.4–10.5)
CHLORIDE SERPL-SCNC: 107 MMOL/L — SIGNIFICANT CHANGE UP (ref 96–108)
CHLORIDE SERPL-SCNC: 107 MMOL/L — SIGNIFICANT CHANGE UP (ref 96–108)
CO2 SERPL-SCNC: 23 MMOL/L — SIGNIFICANT CHANGE UP (ref 22–31)
CO2 SERPL-SCNC: 23 MMOL/L — SIGNIFICANT CHANGE UP (ref 22–31)
CREAT SERPL-MCNC: 0.96 MG/DL — SIGNIFICANT CHANGE UP (ref 0.5–1.3)
CREAT SERPL-MCNC: 0.96 MG/DL — SIGNIFICANT CHANGE UP (ref 0.5–1.3)
EGFR: 95 ML/MIN/1.73M2 — SIGNIFICANT CHANGE UP
EGFR: 95 ML/MIN/1.73M2 — SIGNIFICANT CHANGE UP
GLUCOSE SERPL-MCNC: 95 MG/DL — SIGNIFICANT CHANGE UP (ref 70–99)
GLUCOSE SERPL-MCNC: 95 MG/DL — SIGNIFICANT CHANGE UP (ref 70–99)
HCT VFR BLD CALC: 35.9 % — LOW (ref 39–50)
HCT VFR BLD CALC: 35.9 % — LOW (ref 39–50)
HGB BLD-MCNC: 11.9 G/DL — LOW (ref 13–17)
HGB BLD-MCNC: 11.9 G/DL — LOW (ref 13–17)
MCHC RBC-ENTMCNC: 29.7 PG — SIGNIFICANT CHANGE UP (ref 27–34)
MCHC RBC-ENTMCNC: 29.7 PG — SIGNIFICANT CHANGE UP (ref 27–34)
MCHC RBC-ENTMCNC: 33.1 GM/DL — SIGNIFICANT CHANGE UP (ref 32–36)
MCHC RBC-ENTMCNC: 33.1 GM/DL — SIGNIFICANT CHANGE UP (ref 32–36)
MCV RBC AUTO: 89.5 FL — SIGNIFICANT CHANGE UP (ref 80–100)
MCV RBC AUTO: 89.5 FL — SIGNIFICANT CHANGE UP (ref 80–100)
NRBC # BLD: 0 /100 WBCS — SIGNIFICANT CHANGE UP (ref 0–0)
NRBC # BLD: 0 /100 WBCS — SIGNIFICANT CHANGE UP (ref 0–0)
PLATELET # BLD AUTO: 160 K/UL — SIGNIFICANT CHANGE UP (ref 150–400)
PLATELET # BLD AUTO: 160 K/UL — SIGNIFICANT CHANGE UP (ref 150–400)
POTASSIUM SERPL-MCNC: 3.7 MMOL/L — SIGNIFICANT CHANGE UP (ref 3.5–5.3)
POTASSIUM SERPL-MCNC: 3.7 MMOL/L — SIGNIFICANT CHANGE UP (ref 3.5–5.3)
POTASSIUM SERPL-SCNC: 3.7 MMOL/L — SIGNIFICANT CHANGE UP (ref 3.5–5.3)
POTASSIUM SERPL-SCNC: 3.7 MMOL/L — SIGNIFICANT CHANGE UP (ref 3.5–5.3)
RBC # BLD: 4.01 M/UL — LOW (ref 4.2–5.8)
RBC # BLD: 4.01 M/UL — LOW (ref 4.2–5.8)
RBC # FLD: 12.5 % — SIGNIFICANT CHANGE UP (ref 10.3–14.5)
RBC # FLD: 12.5 % — SIGNIFICANT CHANGE UP (ref 10.3–14.5)
SODIUM SERPL-SCNC: 140 MMOL/L — SIGNIFICANT CHANGE UP (ref 135–145)
SODIUM SERPL-SCNC: 140 MMOL/L — SIGNIFICANT CHANGE UP (ref 135–145)
WBC # BLD: 6.17 K/UL — SIGNIFICANT CHANGE UP (ref 3.8–10.5)
WBC # BLD: 6.17 K/UL — SIGNIFICANT CHANGE UP (ref 3.8–10.5)
WBC # FLD AUTO: 6.17 K/UL — SIGNIFICANT CHANGE UP (ref 3.8–10.5)
WBC # FLD AUTO: 6.17 K/UL — SIGNIFICANT CHANGE UP (ref 3.8–10.5)

## 2024-01-13 RX ORDER — ENOXAPARIN SODIUM 100 MG/ML
40 INJECTION SUBCUTANEOUS EVERY 12 HOURS
Refills: 0 | Status: DISCONTINUED | OUTPATIENT
Start: 2024-01-13 | End: 2024-01-14

## 2024-01-13 RX ORDER — ACETAMINOPHEN 500 MG
650 TABLET ORAL ONCE
Refills: 0 | Status: COMPLETED | OUTPATIENT
Start: 2024-01-13 | End: 2024-01-13

## 2024-01-13 RX ADMIN — TAMSULOSIN HYDROCHLORIDE 0.4 MILLIGRAM(S): 0.4 CAPSULE ORAL at 21:32

## 2024-01-13 RX ADMIN — Medication 1 TABLET(S): at 13:01

## 2024-01-13 RX ADMIN — LACOSAMIDE 150 MILLIGRAM(S): 50 TABLET ORAL at 05:56

## 2024-01-13 RX ADMIN — GABAPENTIN 300 MILLIGRAM(S): 400 CAPSULE ORAL at 05:56

## 2024-01-13 RX ADMIN — ATORVASTATIN CALCIUM 20 MILLIGRAM(S): 80 TABLET, FILM COATED ORAL at 21:32

## 2024-01-13 RX ADMIN — SODIUM CHLORIDE 50 MILLILITER(S): 9 INJECTION INTRAMUSCULAR; INTRAVENOUS; SUBCUTANEOUS at 21:32

## 2024-01-13 RX ADMIN — ENOXAPARIN SODIUM 40 MILLIGRAM(S): 100 INJECTION SUBCUTANEOUS at 18:26

## 2024-01-13 RX ADMIN — Medication 650 MILLIGRAM(S): at 06:05

## 2024-01-13 RX ADMIN — SODIUM CHLORIDE 50 MILLILITER(S): 9 INJECTION INTRAMUSCULAR; INTRAVENOUS; SUBCUTANEOUS at 17:59

## 2024-01-13 RX ADMIN — GABAPENTIN 300 MILLIGRAM(S): 400 CAPSULE ORAL at 17:57

## 2024-01-13 RX ADMIN — LACOSAMIDE 150 MILLIGRAM(S): 50 TABLET ORAL at 17:57

## 2024-01-13 RX ADMIN — Medication 81 MILLIGRAM(S): at 13:01

## 2024-01-13 RX ADMIN — CEFTRIAXONE 100 MILLIGRAM(S): 500 INJECTION, POWDER, FOR SOLUTION INTRAMUSCULAR; INTRAVENOUS at 05:58

## 2024-01-13 RX ADMIN — PANTOPRAZOLE SODIUM 40 MILLIGRAM(S): 20 TABLET, DELAYED RELEASE ORAL at 05:56

## 2024-01-13 RX ADMIN — Medication 650 MILLIGRAM(S): at 06:52

## 2024-01-13 RX ADMIN — ENOXAPARIN SODIUM 90 MILLIGRAM(S): 100 INJECTION SUBCUTANEOUS at 05:57

## 2024-01-13 NOTE — PROGRESS NOTE ADULT - SUBJECTIVE AND OBJECTIVE BOX
Neurology        S: patient seen, resting in bed         Medications: MEDICATIONS  (STANDING):  aspirin enteric coated 81 milliGRAM(s) Oral daily  atorvastatin 20 milliGRAM(s) Oral at bedtime  enoxaparin Injectable 90 milliGRAM(s) SubCutaneous every 12 hours  gabapentin 300 milliGRAM(s) Oral two times a day  lacosamide 150 milliGRAM(s) Oral two times a day  multivitamin 1 Tablet(s) Oral daily  pantoprazole    Tablet 40 milliGRAM(s) Oral before breakfast  sodium chloride 0.9%. 1000 milliLiter(s) (50 mL/Hr) IV Continuous <Continuous>  tamsulosin 0.4 milliGRAM(s) Oral at bedtime    MEDICATIONS  (PRN):       Vitals:  Vital Signs Last 24 Hrs  T(C): 36.4 (13 Jan 2024 05:03), Max: 36.6 (12 Jan 2024 14:27)  T(F): 97.5 (13 Jan 2024 05:03), Max: 97.8 (12 Jan 2024 14:27)  HR: 68 (13 Jan 2024 05:03) (68 - 77)  BP: 120/76 (13 Jan 2024 05:03) (120/76 - 129/87)  BP(mean): --  RR: 18 (13 Jan 2024 05:03) (18 - 18)  SpO2: 97% (13 Jan 2024 05:03) (96% - 97%)    Parameters below as of 13 Jan 2024 05:03  Patient On (Oxygen Delivery Method): room air          General Exam:   General Appearance: Appropriately dressed and in no acute distress       Head: Normocephalic, atraumatic and no dysmorphic features  Ear, Nose, and Throat: Moist mucous membranes  CVS: S1S2+  Resp: No SOB, no wheeze or rhonchi  GI: soft NT/ND  Extremities: No edema or cyanosis  Skin: No bruises or rashes     Neurological Exam:  MS: Eyes open. Awake, alert, oriented to person, place, situation, time. Follows all commands. Attention/concentration, recent and remote memory, and fund of knowledge intact  Language: Speech is clear, fluent with good repetition & comprehension.  CNs: PERRL (R = 3mm, L = 3mm). VFF. EOMI No nystagmus.  No provocation on dizziness with head turning. V1-3 intact to LT b/l. No facial asymmetry b/l, full eye closure strength b/l. Hearing grossly normal (rubbing fingers) b/l. Tongue midline, normal movements, no atrophy. Palate with symmetric elevation. Trap 5/5 b/l  Motor: Normal muscle bulk & tone. No noticeable tremor. No pronator drift. No drift of UE or LE b/l.               Deltoid	Biceps	Triceps	   R	         5	      5	   5	 5	  		 	  L	         5	      5	   5	 5	  		    	H-Flex	K-Flex	K-Ext	D-Flex	P-Flex  R	    5	  5	   5	  5	    5		   L	    5	   5	   5	  4	    5		   *Some pain limitation on LLE.   Sensation: Intact to LT b/l throughout.   Cortical: Extinction on DSS (neglect): none  Reflexes:              Biceps(C5)       BR(C6)     Triceps(C7)               Patellar(L4)    Achilles(S1)    Plantar Resp  R	              3	          3	             3		 3		    3	Withdrawal       L	              3	          3	             3		 2		    1	Withdrawal    Coordination: No dysmetria to FTN/HTS b/l.   Gait: Deferred.     Data/Labs/Imaging which I personally reviewed.     Labs:    LABS:                          11.9   6.17  )-----------( 160      ( 13 Jan 2024 07:02 )             35.9     01-13    140  |  107  |  13  ----------------------------<  95  3.7   |  23  |  0.96    Ca    8.3<L>      13 Jan 2024 07:00  Phos  3.3     01-12  Mg     2.2     01-12          Urinalysis Basic - ( 13 Jan 2024 07:00 )    Color: x / Appearance: x / SG: x / pH: x  Gluc: 95 mg/dL / Ketone: x  / Bili: x / Urobili: x   Blood: x / Protein: x / Nitrite: x   Leuk Esterase: x / RBC: x / WBC x   Sq Epi: x / Non Sq Epi: x / Bacteria: x

## 2024-01-13 NOTE — PROGRESS NOTE ADULT - SUBJECTIVE AND OBJECTIVE BOX
Name of Patient : TAMI DUNHAM  MRN: [id number]  Date of visit: 01-13-24 @ 14:01      Subjective: Patient seen and examined. No new events except as noted.   Patient seen earlier this AM. Sitting up in bed. Started on Lovenox 1Mg/Kg yesterday and noted to have some hematuria. States his urine output was clear- yellow yesterday. Hemoglobin is stable.   Lovenox reduced. Denies pain or discomfort.     REVIEW OF SYSTEMS:    CONSTITUTIONAL: Generalized weakness   EYES/ENT: No visual changes;  No vertigo or throat pain   NECK: No pain or stiffness  RESPIRATORY: No cough, wheezing, hemoptysis; No shortness of breath  CARDIOVASCULAR: No chest pain or palpitations  GASTROINTESTINAL: No abdominal or epigastric pain. No nausea, vomiting, or hematemesis; No diarrhea or constipation. No melena or hematochezia.  GENITOURINARY: +Hematuria (faint, transparent)  NEUROLOGICAL: + H/O CVA; Residual word finding difficulty  SKIN: + S/P L Fasciotomy; LE skin changes; + Upper thigh ecchymosis at Lovenox inj site; Denies itching   All other review of systems is negative unless indicated above.    MEDICATIONS:  MEDICATIONS  (STANDING):  aspirin enteric coated 81 milliGRAM(s) Oral daily  atorvastatin 20 milliGRAM(s) Oral at bedtime  enoxaparin Injectable 40 milliGRAM(s) SubCutaneous every 12 hours  gabapentin 300 milliGRAM(s) Oral two times a day  lacosamide 150 milliGRAM(s) Oral two times a day  multivitamin 1 Tablet(s) Oral daily  pantoprazole    Tablet 40 milliGRAM(s) Oral before breakfast  sodium chloride 0.9%. 1000 milliLiter(s) (50 mL/Hr) IV Continuous <Continuous>  tamsulosin 0.4 milliGRAM(s) Oral at bedtime      PHYSICAL EXAM:  T(C): 36.6 (01-13-24 @ 12:20), Max: 36.6 (01-12-24 @ 14:27)  HR: 75 (01-13-24 @ 12:20) (68 - 77)  BP: 125/76 (01-13-24 @ 12:20) (120/76 - 129/87)  RR: 18 (01-13-24 @ 12:20) (18 - 18)  SpO2: 98% (01-13-24 @ 12:20) (96% - 98%)  Wt(kg): --  I&O's Summary    12 Jan 2024 07:01  -  13 Jan 2024 07:00  --------------------------------------------------------  IN: 600 mL / OUT: 1200 mL / NET: -600 mL          Appearance: Awake, Sitting up in bed, in no acute distress   HEENT: Eyes are open   Lymphatic: No lymphadenopathy grossly   Cardiovascular: Normal S1 S2   Respiratory: normal effort, clear  Gastrointestinal:  Soft, Non-tender  Skin: + B/L thigh ecchymosis at Lovenox injection sites  Psychiatry:  Mood & affect appropriate  Musculoskeletal: + Prior LLE Fasciotomy; Chronic skin changes          01-12-24 @ 07:01  -  01-13-24 @ 07:00  --------------------------------------------------------  IN: 600 mL / OUT: 1200 mL / NET: -600 mL                                11.9   6.17  )-----------( 160      ( 13 Jan 2024 07:02 )             35.9               01-13    140  |  107  |  13  ----------------------------<  95  3.7   |  23  |  0.96    Ca    8.3<L>      13 Jan 2024 07:00  Phos  3.3     01-12  Mg     2.2     01-12                         Urinalysis Basic - ( 13 Jan 2024 07:00 )    Color: x / Appearance: x / SG: x / pH: x  Gluc: 95 mg/dL / Ketone: x  / Bili: x / Urobili: x   Blood: x / Protein: x / Nitrite: x   Leuk Esterase: x / RBC: x / WBC x   Sq Epi: x / Non Sq Epi: x / Bacteria: x        Culture - Urine (01.10.24 @ 07:39)   Specimen Source: Clean Catch Clean Catch (Midstream)  Culture Results: <10,000 CFU/mL Normal Urogenital Narda    Culture - Urine (01.10.24 @ 00:41)   Specimen Source: Clean Catch Clean Catch (Midstream)  Culture Results: No growth    < from: [facility] Duplex Lower Ext Vein Scan, Bilat (01.12.24 @ 13:11) >  IMPRESSION:  No evidence of deep venous thrombosis in either lower extremity.    Interval resolution of calf DVT.    < end of copied text >   Name of Patient : TAMI DUNHAM  MRN: [id number]  Date of visit: 01-13-24 @ 14:01      Subjective: Patient seen and examined. No new events except as noted.   Patient seen earlier this AM. Sitting up in bed. Started on Lovenox 1Mg/Kg yesterday and noted to have some hematuria. States his urine output was clear- yellow yesterday. Hemoglobin is stable.   Lovenox reduced. Denies pain or discomfort.     REVIEW OF SYSTEMS:    CONSTITUTIONAL: Generalized weakness   EYES/ENT: No visual changes;  No vertigo or throat pain   NECK: No pain or stiffness  RESPIRATORY: No cough, wheezing, hemoptysis; No shortness of breath  CARDIOVASCULAR: No chest pain or palpitations  GASTROINTESTINAL: No abdominal or epigastric pain. No nausea, vomiting, or hematemesis; No diarrhea or constipation. No melena or hematochezia.  GENITOURINARY: +Hematuria (faint, transparent)  NEUROLOGICAL: + H/O CVA; Residual word finding difficulty  SKIN: + S/P L Fasciotomy; LE skin changes; + Upper thigh ecchymosis at Lovenox inj site; Denies itching   All other review of systems is negative unless indicated above.    MEDICATIONS:  MEDICATIONS  (STANDING):  aspirin enteric coated 81 milliGRAM(s) Oral daily  atorvastatin 20 milliGRAM(s) Oral at bedtime  enoxaparin Injectable 40 milliGRAM(s) SubCutaneous every 12 hours  gabapentin 300 milliGRAM(s) Oral two times a day  lacosamide 150 milliGRAM(s) Oral two times a day  multivitamin 1 Tablet(s) Oral daily  pantoprazole    Tablet 40 milliGRAM(s) Oral before breakfast  sodium chloride 0.9%. 1000 milliLiter(s) (50 mL/Hr) IV Continuous <Continuous>  tamsulosin 0.4 milliGRAM(s) Oral at bedtime      PHYSICAL EXAM:  T(C): 36.6 (01-13-24 @ 12:20), Max: 36.6 (01-12-24 @ 14:27)  HR: 75 (01-13-24 @ 12:20) (68 - 77)  BP: 125/76 (01-13-24 @ 12:20) (120/76 - 129/87)  RR: 18 (01-13-24 @ 12:20) (18 - 18)  SpO2: 98% (01-13-24 @ 12:20) (96% - 98%)  Wt(kg): --  I&O's Summary    12 Jan 2024 07:01  -  13 Jan 2024 07:00  --------------------------------------------------------  IN: 600 mL / OUT: 1200 mL / NET: -600 mL          Appearance: Awake, Sitting up in bed, in no acute distress   HEENT: Eyes are open   Lymphatic: No lymphadenopathy grossly   Cardiovascular: Normal S1 S2   Respiratory: normal effort, clear  Gastrointestinal:  Soft, Non-tender  Skin: + B/L thigh ecchymosis at Lovenox injection sites  Psychiatry:  Mood & affect appropriate  Musculoskeletal: + Prior LLE Fasciotomy; Chronic skin changes          01-12-24 @ 07:01  -  01-13-24 @ 07:00  --------------------------------------------------------  IN: 600 mL / OUT: 1200 mL / NET: -600 mL                                11.9   6.17  )-----------( 160      ( 13 Jan 2024 07:02 )             35.9               01-13    140  |  107  |  13  ----------------------------<  95  3.7   |  23  |  0.96    Ca    8.3<L>      13 Jan 2024 07:00  Phos  3.3     01-12  Mg     2.2     01-12                         Urinalysis Basic - ( 13 Jan 2024 07:00 )    Color: x / Appearance: x / SG: x / pH: x  Gluc: 95 mg/dL / Ketone: x  / Bili: x / Urobili: x   Blood: x / Protein: x / Nitrite: x   Leuk Esterase: x / RBC: x / WBC x   Sq Epi: x / Non Sq Epi: x / Bacteria: x        Culture - Urine (01.10.24 @ 07:39)   Specimen Source: Clean Catch Clean Catch (Midstream)  Culture Results: <10,000 CFU/mL Normal Urogenital Narda    Culture - Urine (01.10.24 @ 00:41)   Specimen Source: Clean Catch Clean Catch (Midstream)  Culture Results: No growth    < from: [facility] Duplex Lower Ext Vein Scan, Bilat (01.12.24 @ 13:11) >  IMPRESSION:  No evidence of deep venous thrombosis in either lower extremity.    Interval resolution of calf DVT.    < end of copied text >

## 2024-01-13 NOTE — DISCHARGE NOTE NURSING/CASE MANAGEMENT/SOCIAL WORK - NSDCPEFALRISK_GEN_ALL_CORE
For information on Fall & Injury Prevention, visit: [email or web address] OR  [email or web address] OR  https://www.cdc.gov/steadi/patient.html For information on Fall & Injury Prevention, visit: [email or web address] OR  [email or web address] OR  https://www.cdc.gov/steadi/patient.html

## 2024-01-13 NOTE — PROGRESS NOTE ADULT - ASSESSMENT
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  Saw his urologist  was started on an antibioti. Patient also reports several episodes of severe intermittent left flank pain today, denies history of kidney stones to his knowledge.  Denies associated nausea, vomiting, diarrhea, dysuria, fever, chill  LDL 60  LE duplex with + B/L below knee DVT   outpatient hypercoag workup in past neg as part of stroke workup     Impression:   1) chronic strokes, resid [facility]  2) seizure d/o 2/2 storkes  3) ADHD  4) vertigo BPPV     -0 resume AC when able ; lovenox restarted but now BID   - for ADHD gets Vyvanse 50mg daily  - for seizure he is on vimpat should now be 150mg BID  and was taken off keppra   - f/u heme/onc, new dvt was on lovenox ; now changed to BID dosing   - for secondary stroke prevention he is on asa 81mg daily and full dose lovenox .  - statin therapy with LDL goal < 70mg/dL; atorvastatin 20 at home   - f/u  for flank pain, hematuria and obstruction   - abx for UTI   - PT/OT   - meclizion prn for vertigo   - check FS, glucose control <180  - GI/DVT    - Thank you for allowing me to participate in the care of this patient. Call with questions.   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  Saw his urologist  was started on an antibioti. Patient also reports several episodes of severe intermittent left flank pain today, denies history of kidney stones to his knowledge.  Denies associated nausea, vomiting, diarrhea, dysuria, fever, chill  LDL 60  LE duplex with + B/L below knee DVT   outpatient hypercoag workup in past neg as part of stroke workup     Impression:   1) chronic strokes, resid [facility]  2) seizure d/o 2/2 storkes  3) ADHD  4) vertigo BPPV     -0 resume AC when able ; lovenox restarted but now BID   - for ADHD gets Vyvanse 50mg daily  - for seizure he is on vimpat should now be 150mg BID  and was taken off keppra   - f/u heme/onc, new dvt was on lovenox ; now changed to BID dosing   - for secondary stroke prevention he is on asa 81mg daily and full dose lovenox .  - statin therapy with LDL goal < 70mg/dL; atorvastatin 20 at home   - f/u  for flank pain, hematuria and obstruction   - abx for UTI   - PT/OT   - meclizion prn for vertigo   - check FS, glucose control <180  - GI/DVT    - Thank you for allowing me to participate in the care of this patient. Call with questions.   known to me from office   Braxton Forde MD  Vascular Neurology  Office: [phone]

## 2024-01-13 NOTE — PROGRESS NOTE ADULT - ASSESSMENT
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 Patient reports that when his hematuria began, he attributed it to increase intake of cranberry juice. Patient states that when his hematuria did not subside, he presented to his outpatient Urologist's office for which he was started on Cipro for UTI and only took one dose. Patient also endorses left flank pain. Patient denies history of nephrolithiasis or previous episodes of hematuria. Patient denies dysuria, frequency or urgency, chills, body aches, nausea, vomiting, headache, lightheadedness or dizziness.       Hematuria   - Pt reports last dose of Lovenox injection was 11/09 AM prior to arrival   - Hgb stable, however with gross hematuria and hemorrhage seen on CT   - Check CBC BID - Monitor H/H and Hgb closely --> F/u Repeat CBC, mild drop in Hgb - stable on repeat   - Hematuria originally resolved - however now with slight recurrence following resumption of Lovenox BID. Lovenox reduced to 40 BID. Monitor Urine output and Hgb level.    - Urology evaluation appreciated; F/u recs    UTI   - With associated flank pain - now resolved. UA grossly positive.    - S/P course of Rocephin. UCx --> negative   - Hydration as tolerated   - Monitor CBC, Temp Curve, VS and patient closely     Hydronephrosis   - CT w/ mild L hydronephrosis with abrupt tapering of the dilated renal collecting system at the level of the UPJ, Hyperdense material within the dilated L upper renal pole most likely representing hemorrhage, Moderate L Perinephric/ periureteric stranding, no calculi identified   - C/w Tamsulosin; Monitor I and O   - As per Urology, patient will require close outpatient follow up and work up for UPJ obstruction   - Urology eval appreciated; F/u recs    Hemorrhage   - Seen on CT as above  - Check serial CBC BID   - Monitor H/H closely. Transfuse for Hgb < 7.0   - Urology eval appreciated; F/u recs    DVT  - LE Duplex w/ B/L soleal vein and right peroneal vein thrombus, Acute DVT below the knee    - On Lovenox for  Mg BID, now on hold in the setting of hematuria. Resume as able to   - Vascular Cardiology eval appreciated; F/u recs --> Duplex 1/12 with resolution of DVT   - Hematology eval appreciated; F/u recs     Seizure   - Per history   - C/w Vimpat 150 BID, off of Keppra as per Neuro.   - Seizure precautions  - Neuro eval appreciated; F/u recs    CVA  - Per History   - On ASA, Lovenox and Statin  - Neuro eval appreciated     ADHD  - Vyvanse 50 Mg not available in patient. Resume on DC  - Fall, Seizure, Aspiration precautions  - Neuro eval appreciated; F/u recs     LLE Fasciotomy   - 2/2 compartment syndrome.   - Vascular surgery eval appreciated; F/u recs --> No acute intervention at this time     HLD  - Lipid panel noted; C/w Lipitor 20    Vertigo  - Per History. No longer on Meclizine per patient  - Fall, Seizure and Aspiration precautions   - Ambulate with Assistance  - PT eval     PPX  - Lovenox   - PPI         Discussed with Attending and ACP.  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 Patient reports that when his hematuria began, he attributed it to increase intake of cranberry juice. Patient states that when his hematuria did not subside, he presented to his outpatient Urologist's office for which he was started on Cipro for UTI and only took one dose. Patient also endorses left flank pain. Patient denies history of nephrolithiasis or previous episodes of hematuria. Patient denies dysuria, frequency or urgency, chills, body aches, nausea, vomiting, headache, lightheadedness or dizziness.       Hematuria   - Pt reports last dose of Lovenox injection was 11/09 AM prior to arrival   - Hgb stable, however with gross hematuria and hemorrhage seen on CT   - Check CBC BID - Monitor H/H and Hgb closely --> F/u Repeat CBC, mild drop in Hgb - stable on repeat   - Hematuria originally resolved - however now with slight recurrence following resumption of Lovenox BID. Lovenox reduced to 40 BID. Monitor Urine output and Hgb level.    - Urology evaluation appreciated; F/u recs    UTI   - With associated flank pain - now resolved. UA grossly positive.    - S/P course of Rocephin. UCx --> negative   - Hydration as tolerated   - Monitor CBC, Temp Curve, VS and patient closely     Hydronephrosis   - CT w/ mild L hydronephrosis with abrupt tapering of the dilated renal collecting system at the level of the UPJ, Hyperdense material within the dilated L upper renal pole most likely representing hemorrhage, Moderate L Perinephric/ periureteric stranding, no calculi identified   - C/w Tamsulosin; Monitor I and O   - As per Urology, patient will require close outpatient follow up and work up for UPJ obstruction   - Urology eval appreciated; F/u recs    Hemorrhage   - Seen on CT as above  - Check serial CBC BID   - Monitor H/H closely. Transfuse for Hgb < 7.0   - Urology eval appreciated; F/u recs    DVT  - LE Duplex w/ B/L soleal vein and right peroneal vein thrombus, Acute DVT below the knee    - On Lovenox for  Mg BID, now on hold in the setting of hematuria. Resume as able to   - Vascular Cardiology eval appreciated; F/u recs --> Duplex 1/12 with resolution of DVT   - Hematology eval appreciated; F/u recs     Seizure   - Per history   - C/w Vimpat 150 BID, off of Keppra as per Neuro.   - Seizure precautions  - Neuro eval appreciated; F/u recs    CVA  - Per History   - On ASA, Lovenox and Statin  - Neuro eval appreciated     ADHD  - Vyvanse 50 Mg not available in patient. Resume on DC  - Fall, Seizure, Aspiration precautions  - Neuro eval appreciated; F/u recs     LLE Fasciotomy   - 2/2 compartment syndrome.   - Vascular surgery eval appreciated; F/u recs --> No acute intervention at this time     HLD  - Lipid panel noted; C/w Lipitor 20    Vertigo  - Per History. No longer on Meclizine per patient  - Fall, Seizure and Aspiration precautions   - Ambulate with Assistance  - PT eval     PPX  - Lovenox   - PPI         Discussed with Attending and ACP.  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 Patient reports that when his hematuria began, he attributed it to increase intake of cranberry juice. Patient states that when his hematuria did not subside, he presented to his outpatient Urologist's office for which he was started on Cipro for UTI and only took one dose. Patient also endorses left flank pain. Patient denies history of nephrolithiasis or previous episodes of hematuria. Patient denies dysuria, frequency or urgency, chills, body aches, nausea, vomiting, headache, lightheadedness or dizziness.       Hematuria   - Pt reports last dose of Lovenox injection was 11/09 AM prior to arrival   - Hgb stable, however with gross hematuria and hemorrhage seen on CT   - Check CBC BID - Monitor H/H and Hgb closely --> F/u Repeat CBC, mild drop in Hgb - stable on repeat   - Hematuria originally resolved - however now with slight recurrence following resumption of Lovenox BID. Lovenox reduced to 40 BID. Monitor Urine output and Hgb level.    - Urology evaluation appreciated; F/u recs    UTI   - With associated flank pain - now resolved. UA grossly positive.    - S/P course of Rocephin. UCx --> negative   - Hydration as tolerated   - Monitor CBC, Temp Curve, VS and patient closely     Hydronephrosis   - CT w/ mild L hydronephrosis with abrupt tapering of the dilated renal collecting system at the level of the UPJ, Hyperdense material within the dilated L upper renal pole most likely representing hemorrhage, Moderate L Perinephric/ periureteric stranding, no calculi identified   - C/w Tamsulosin; Monitor I and O   - As per Urology, patient will require close outpatient follow up and work up for UPJ obstruction   - Urology eval appreciated; F/u recs    Hemorrhage   - Seen on CT as above  - Check serial CBC BID   - Duplex with possible soleal muscle hematoma ? -- Monitor H/H   - Monitor H/H closely. Transfuse for Hgb < 7.0   - Urology eval appreciated; F/u recs    DVT  - LE Duplex w/ B/L soleal vein and right peroneal vein thrombus, Acute DVT below the knee    - On Lovenox for  Mg BID, now on hold in the setting of hematuria. Resume as able to   - Vascular Cardiology eval appreciated; F/u recs --> Duplex 1/12 with resolution of DVT   - Hematology eval appreciated; F/u recs     Seizure   - Per history   - C/w Vimpat 150 BID, off of Keppra as per Neuro.   - Seizure precautions  - Neuro eval appreciated; F/u recs    CVA  - Per History   - On ASA, Lovenox and Statin  - Neuro eval appreciated     ADHD  - Vyvanse 50 Mg not available in patient. Resume on DC  - Fall, Seizure, Aspiration precautions  - Neuro eval appreciated; F/u recs     LLE Fasciotomy   - 2/2 compartment syndrome.   - Vascular surgery eval appreciated; F/u recs --> No acute intervention at this time     HLD  - Lipid panel noted; C/w Lipitor 20    Vertigo  - Per History. No longer on Meclizine per patient  - Fall, Seizure and Aspiration precautions   - Ambulate with Assistance  - PT eval     PPX  - Lovenox   - PPI         Discussed with Attending and ACP.  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 Patient reports that when his hematuria began, he attributed it to increase intake of cranberry juice. Patient states that when his hematuria did not subside, he presented to his outpatient Urologist's office for which he was started on Cipro for UTI and only took one dose. Patient also endorses left flank pain. Patient denies history of nephrolithiasis or previous episodes of hematuria. Patient denies dysuria, frequency or urgency, chills, body aches, nausea, vomiting, headache, lightheadedness or dizziness.       Hematuria   - Pt reports last dose of Lovenox injection was 11/09 AM prior to arrival   - Hgb stable, however with gross hematuria and hemorrhage seen on CT   - Check CBC BID - Monitor H/H and Hgb closely --> F/u Repeat CBC, mild drop in Hgb - stable on repeat   - Hematuria originally resolved - however now with slight recurrence following resumption of Lovenox BID. Lovenox reduced to 40 BID. Monitor Urine output and Hgb level.    - Urology evaluation appreciated; F/u recs    UTI   - With associated flank pain - now resolved. UA grossly positive.    - S/P course of Rocephin. UCx --> negative   - Hydration as tolerated   - Monitor CBC, Temp Curve, VS and patient closely     Hydronephrosis   - CT w/ mild L hydronephrosis with abrupt tapering of the dilated renal collecting system at the level of the UPJ, Hyperdense material within the dilated L upper renal pole most likely representing hemorrhage, Moderate L Perinephric/ periureteric stranding, no calculi identified   - C/w Tamsulosin; Monitor I and O   - As per Urology, patient will require close outpatient follow up and work up for UPJ obstruction   - Urology eval appreciated; F/u recs    Hemorrhage   - Seen on CT as above  - Check serial CBC BID   - Duplex with possible soleal muscle hematoma ? -- Monitor H/H   - Monitor H/H closely. Transfuse for Hgb < 7.0   - Urology eval appreciated; F/u recs    DVT  - LE Duplex w/ B/L soleal vein and right peroneal vein thrombus, Acute DVT below the knee    - On Lovenox for  Mg BID, now on hold in the setting of hematuria. Resume as able to   - Vascular Cardiology eval appreciated; F/u recs --> Duplex 1/12 with resolution of DVT   - Hematology eval appreciated; F/u recs     Seizure   - Per history   - C/w Vimpat 150 BID, off of Keppra as per Neuro.   - Seizure precautions  - Neuro eval appreciated; F/u recs    CVA  - Per History   - On ASA, Lovenox and Statin  - Neuro eval appreciated     ADHD  - Vyvanse 50 Mg not available in patient. Resume on DC  - Fall, Seizure, Aspiration precautions  - Neuro eval appreciated; F/u recs     LLE Fasciotomy   - 2/2 compartment syndrome.   - Vascular surgery eval appreciated; F/u recs --> No acute intervention at this time     HLD  - Lipid panel noted; C/w Lipitor 20    Vertigo  - Per History. No longer on Meclizine per patient  - Fall, Seizure and Aspiration precautions   - Ambulate with Assistance  - PT eval     PPX  - Lovenox   - PPI         Discussed with Attending and ACP.

## 2024-01-13 NOTE — DISCHARGE NOTE NURSING/CASE MANAGEMENT/SOCIAL WORK - PATIENT PORTAL LINK FT
You can access the FollowMyHealth Patient Portal offered by [facility] by registering at the following website: [email or web address]. By joining [employer]’s FollowMyHealth portal, you will also be able to view your health information using other applications (apps) compatible with our system. You can access the FollowMyHealth Patient Portal offered by [facility] by registering at the following website: [email or web address]. By joining [employer]’s FollowMyHealth portal, you will also be able to view your health information using other applications (apps) compatible with our system.

## 2024-01-14 LAB
ANION GAP SERPL CALC-SCNC: 11 MMOL/L — SIGNIFICANT CHANGE UP (ref 5–17)
ANION GAP SERPL CALC-SCNC: 11 MMOL/L — SIGNIFICANT CHANGE UP (ref 5–17)
BASOPHILS # BLD AUTO: 0.08 K/UL — SIGNIFICANT CHANGE UP (ref 0–0.2)
BASOPHILS # BLD AUTO: 0.08 K/UL — SIGNIFICANT CHANGE UP (ref 0–0.2)
BASOPHILS NFR BLD AUTO: 1.2 % — SIGNIFICANT CHANGE UP (ref 0–2)
BASOPHILS NFR BLD AUTO: 1.2 % — SIGNIFICANT CHANGE UP (ref 0–2)
BUN SERPL-MCNC: 12 MG/DL — SIGNIFICANT CHANGE UP (ref 7–23)
BUN SERPL-MCNC: 12 MG/DL — SIGNIFICANT CHANGE UP (ref 7–23)
CALCIUM SERPL-MCNC: 8.1 MG/DL — LOW (ref 8.4–10.5)
CALCIUM SERPL-MCNC: 8.1 MG/DL — LOW (ref 8.4–10.5)
CHLORIDE SERPL-SCNC: 110 MMOL/L — HIGH (ref 96–108)
CHLORIDE SERPL-SCNC: 110 MMOL/L — HIGH (ref 96–108)
CO2 SERPL-SCNC: 22 MMOL/L — SIGNIFICANT CHANGE UP (ref 22–31)
CO2 SERPL-SCNC: 22 MMOL/L — SIGNIFICANT CHANGE UP (ref 22–31)
CREAT SERPL-MCNC: 0.87 MG/DL — SIGNIFICANT CHANGE UP (ref 0.5–1.3)
CREAT SERPL-MCNC: 0.87 MG/DL — SIGNIFICANT CHANGE UP (ref 0.5–1.3)
EGFR: 103 ML/MIN/1.73M2 — SIGNIFICANT CHANGE UP
EGFR: 103 ML/MIN/1.73M2 — SIGNIFICANT CHANGE UP
EOSINOPHIL # BLD AUTO: 0.35 K/UL — SIGNIFICANT CHANGE UP (ref 0–0.5)
EOSINOPHIL # BLD AUTO: 0.35 K/UL — SIGNIFICANT CHANGE UP (ref 0–0.5)
EOSINOPHIL NFR BLD AUTO: 5.4 % — SIGNIFICANT CHANGE UP (ref 0–6)
EOSINOPHIL NFR BLD AUTO: 5.4 % — SIGNIFICANT CHANGE UP (ref 0–6)
GLUCOSE SERPL-MCNC: 82 MG/DL — SIGNIFICANT CHANGE UP (ref 70–99)
GLUCOSE SERPL-MCNC: 82 MG/DL — SIGNIFICANT CHANGE UP (ref 70–99)
HCT VFR BLD CALC: 35.5 % — LOW (ref 39–50)
HCT VFR BLD CALC: 35.5 % — LOW (ref 39–50)
HCT VFR BLD CALC: 39.8 % — SIGNIFICANT CHANGE UP (ref 39–50)
HCT VFR BLD CALC: 39.8 % — SIGNIFICANT CHANGE UP (ref 39–50)
HGB BLD-MCNC: 11.8 G/DL — LOW (ref 13–17)
HGB BLD-MCNC: 11.8 G/DL — LOW (ref 13–17)
HGB BLD-MCNC: 13.5 G/DL — SIGNIFICANT CHANGE UP (ref 13–17)
HGB BLD-MCNC: 13.5 G/DL — SIGNIFICANT CHANGE UP (ref 13–17)
IMM GRANULOCYTES NFR BLD AUTO: 1.2 % — HIGH (ref 0–0.9)
IMM GRANULOCYTES NFR BLD AUTO: 1.2 % — HIGH (ref 0–0.9)
LYMPHOCYTES # BLD AUTO: 1.73 K/UL — SIGNIFICANT CHANGE UP (ref 1–3.3)
LYMPHOCYTES # BLD AUTO: 1.73 K/UL — SIGNIFICANT CHANGE UP (ref 1–3.3)
LYMPHOCYTES # BLD AUTO: 26.9 % — SIGNIFICANT CHANGE UP (ref 13–44)
LYMPHOCYTES # BLD AUTO: 26.9 % — SIGNIFICANT CHANGE UP (ref 13–44)
MCHC RBC-ENTMCNC: 29.8 PG — SIGNIFICANT CHANGE UP (ref 27–34)
MCHC RBC-ENTMCNC: 29.8 PG — SIGNIFICANT CHANGE UP (ref 27–34)
MCHC RBC-ENTMCNC: 30 PG — SIGNIFICANT CHANGE UP (ref 27–34)
MCHC RBC-ENTMCNC: 30 PG — SIGNIFICANT CHANGE UP (ref 27–34)
MCHC RBC-ENTMCNC: 33.2 GM/DL — SIGNIFICANT CHANGE UP (ref 32–36)
MCHC RBC-ENTMCNC: 33.2 GM/DL — SIGNIFICANT CHANGE UP (ref 32–36)
MCHC RBC-ENTMCNC: 33.9 GM/DL — SIGNIFICANT CHANGE UP (ref 32–36)
MCHC RBC-ENTMCNC: 33.9 GM/DL — SIGNIFICANT CHANGE UP (ref 32–36)
MCV RBC AUTO: 88.4 FL — SIGNIFICANT CHANGE UP (ref 80–100)
MCV RBC AUTO: 88.4 FL — SIGNIFICANT CHANGE UP (ref 80–100)
MCV RBC AUTO: 89.6 FL — SIGNIFICANT CHANGE UP (ref 80–100)
MCV RBC AUTO: 89.6 FL — SIGNIFICANT CHANGE UP (ref 80–100)
MONOCYTES # BLD AUTO: 0.66 K/UL — SIGNIFICANT CHANGE UP (ref 0–0.9)
MONOCYTES # BLD AUTO: 0.66 K/UL — SIGNIFICANT CHANGE UP (ref 0–0.9)
MONOCYTES NFR BLD AUTO: 10.3 % — SIGNIFICANT CHANGE UP (ref 2–14)
MONOCYTES NFR BLD AUTO: 10.3 % — SIGNIFICANT CHANGE UP (ref 2–14)
NEUTROPHILS # BLD AUTO: 3.53 K/UL — SIGNIFICANT CHANGE UP (ref 1.8–7.4)
NEUTROPHILS # BLD AUTO: 3.53 K/UL — SIGNIFICANT CHANGE UP (ref 1.8–7.4)
NEUTROPHILS NFR BLD AUTO: 55 % — SIGNIFICANT CHANGE UP (ref 43–77)
NEUTROPHILS NFR BLD AUTO: 55 % — SIGNIFICANT CHANGE UP (ref 43–77)
NRBC # BLD: 0 /100 WBCS — SIGNIFICANT CHANGE UP (ref 0–0)
PLATELET # BLD AUTO: 166 K/UL — SIGNIFICANT CHANGE UP (ref 150–400)
PLATELET # BLD AUTO: 166 K/UL — SIGNIFICANT CHANGE UP (ref 150–400)
PLATELET # BLD AUTO: 208 K/UL — SIGNIFICANT CHANGE UP (ref 150–400)
PLATELET # BLD AUTO: 208 K/UL — SIGNIFICANT CHANGE UP (ref 150–400)
POTASSIUM SERPL-MCNC: 3.8 MMOL/L — SIGNIFICANT CHANGE UP (ref 3.5–5.3)
POTASSIUM SERPL-MCNC: 3.8 MMOL/L — SIGNIFICANT CHANGE UP (ref 3.5–5.3)
POTASSIUM SERPL-SCNC: 3.8 MMOL/L — SIGNIFICANT CHANGE UP (ref 3.5–5.3)
POTASSIUM SERPL-SCNC: 3.8 MMOL/L — SIGNIFICANT CHANGE UP (ref 3.5–5.3)
RBC # BLD: 3.96 M/UL — LOW (ref 4.2–5.8)
RBC # BLD: 3.96 M/UL — LOW (ref 4.2–5.8)
RBC # BLD: 4.5 M/UL — SIGNIFICANT CHANGE UP (ref 4.2–5.8)
RBC # BLD: 4.5 M/UL — SIGNIFICANT CHANGE UP (ref 4.2–5.8)
RBC # FLD: 12.5 % — SIGNIFICANT CHANGE UP (ref 10.3–14.5)
RBC # FLD: 12.5 % — SIGNIFICANT CHANGE UP (ref 10.3–14.5)
RBC # FLD: 12.6 % — SIGNIFICANT CHANGE UP (ref 10.3–14.5)
RBC # FLD: 12.6 % — SIGNIFICANT CHANGE UP (ref 10.3–14.5)
SODIUM SERPL-SCNC: 143 MMOL/L — SIGNIFICANT CHANGE UP (ref 135–145)
SODIUM SERPL-SCNC: 143 MMOL/L — SIGNIFICANT CHANGE UP (ref 135–145)
WBC # BLD: 6.43 K/UL — SIGNIFICANT CHANGE UP (ref 3.8–10.5)
WBC # BLD: 6.43 K/UL — SIGNIFICANT CHANGE UP (ref 3.8–10.5)
WBC # BLD: 6.95 K/UL — SIGNIFICANT CHANGE UP (ref 3.8–10.5)
WBC # BLD: 6.95 K/UL — SIGNIFICANT CHANGE UP (ref 3.8–10.5)
WBC # FLD AUTO: 6.43 K/UL — SIGNIFICANT CHANGE UP (ref 3.8–10.5)
WBC # FLD AUTO: 6.43 K/UL — SIGNIFICANT CHANGE UP (ref 3.8–10.5)
WBC # FLD AUTO: 6.95 K/UL — SIGNIFICANT CHANGE UP (ref 3.8–10.5)
WBC # FLD AUTO: 6.95 K/UL — SIGNIFICANT CHANGE UP (ref 3.8–10.5)

## 2024-01-14 RX ORDER — ENOXAPARIN SODIUM 100 MG/ML
100 INJECTION SUBCUTANEOUS EVERY 12 HOURS
Refills: 0 | Status: DISCONTINUED | OUTPATIENT
Start: 2024-01-14 | End: 2024-01-18

## 2024-01-14 RX ADMIN — ENOXAPARIN SODIUM 100 MILLIGRAM(S): 100 INJECTION SUBCUTANEOUS at 17:36

## 2024-01-14 RX ADMIN — LACOSAMIDE 150 MILLIGRAM(S): 50 TABLET ORAL at 06:48

## 2024-01-14 RX ADMIN — ENOXAPARIN SODIUM 40 MILLIGRAM(S): 100 INJECTION SUBCUTANEOUS at 06:47

## 2024-01-14 RX ADMIN — GABAPENTIN 300 MILLIGRAM(S): 400 CAPSULE ORAL at 06:48

## 2024-01-14 RX ADMIN — GABAPENTIN 300 MILLIGRAM(S): 400 CAPSULE ORAL at 17:36

## 2024-01-14 RX ADMIN — Medication 81 MILLIGRAM(S): at 11:09

## 2024-01-14 RX ADMIN — TAMSULOSIN HYDROCHLORIDE 0.4 MILLIGRAM(S): 0.4 CAPSULE ORAL at 21:44

## 2024-01-14 RX ADMIN — LACOSAMIDE 150 MILLIGRAM(S): 50 TABLET ORAL at 17:38

## 2024-01-14 RX ADMIN — Medication 1 TABLET(S): at 11:09

## 2024-01-14 RX ADMIN — PANTOPRAZOLE SODIUM 40 MILLIGRAM(S): 20 TABLET, DELAYED RELEASE ORAL at 06:48

## 2024-01-14 RX ADMIN — ATORVASTATIN CALCIUM 20 MILLIGRAM(S): 80 TABLET, FILM COATED ORAL at 21:44

## 2024-01-14 NOTE — PROGRESS NOTE ADULT - SUBJECTIVE AND OBJECTIVE BOX
Neurology        S: patient seen, resting in bed         Medications: MEDICATIONS  (STANDING):  aspirin enteric coated 81 milliGRAM(s) Oral daily  atorvastatin 20 milliGRAM(s) Oral at bedtime  enoxaparin Injectable 100 milliGRAM(s) SubCutaneous every 12 hours  gabapentin 300 milliGRAM(s) Oral two times a day  lacosamide 150 milliGRAM(s) Oral two times a day  multivitamin 1 Tablet(s) Oral daily  pantoprazole    Tablet 40 milliGRAM(s) Oral before breakfast  sodium chloride 0.9%. 1000 milliLiter(s) (50 mL/Hr) IV Continuous <Continuous>  tamsulosin 0.4 milliGRAM(s) Oral at bedtime    MEDICATIONS  (PRN):       Vitals:  Vital Signs Last 24 Hrs  T(C): 36.7 (14 Jan 2024 18:05), Max: 36.7 (14 Jan 2024 18:05)  T(F): 98 (14 Jan 2024 18:05), Max: 98 (14 Jan 2024 18:05)  HR: 68 (14 Jan 2024 18:05) (63 - 73)  BP: 131/88 (14 Jan 2024 18:05) (101/60 - 131/88)  BP(mean): --  RR: 18 (14 Jan 2024 18:05) (18 - 18)  SpO2: 97% (14 Jan 2024 18:05) (96% - 97%)    Parameters below as of 14 Jan 2024 18:05  Patient On (Oxygen Delivery Method): room air                  General Exam:   General Appearance: Appropriately dressed and in no acute distress       Head: Normocephalic, atraumatic and no dysmorphic features  Ear, Nose, and Throat: Moist mucous membranes  CVS: S1S2+  Resp: No SOB, no wheeze or rhonchi  GI: soft NT/ND  Extremities: No edema or cyanosis  Skin: No bruises or rashes     Neurological Exam:  MS: Eyes open. Awake, alert, oriented to person, place, situation, time. Follows all commands. Attention/concentration, recent and remote memory, and fund of knowledge intact  Language: Speech is clear, fluent with good repetition & comprehension.  CNs: PERRL (R = 3mm, L = 3mm). VFF. EOMI No nystagmus.  No provocation on dizziness with head turning. V1-3 intact to LT b/l. No facial asymmetry b/l, full eye closure strength b/l. Hearing grossly normal (rubbing fingers) b/l. Tongue midline, normal movements, no atrophy. Palate with symmetric elevation. Trap 5/5 b/l  Motor: Normal muscle bulk & tone. No noticeable tremor. No pronator drift. No drift of UE or LE b/l.               Deltoid	Biceps	Triceps	   R	         5	      5	   5	 5	  		 	  L	         5	      5	   5	 5	  		    	H-Flex	K-Flex	K-Ext	D-Flex	P-Flex  R	    5	  5	   5	  5	    5		   L	    5	   5	   5	  4	    5		   *Some pain limitation on LLE.   Sensation: Intact to LT b/l throughout.   Cortical: Extinction on DSS (neglect): none  Reflexes:              Biceps(C5)       BR(C6)     Triceps(C7)               Patellar(L4)    Achilles(S1)    Plantar Resp  R	              3	          3	             3		 3		    3	Withdrawal       L	              3	          3	             3		 2		    1	Withdrawal    Coordination: No dysmetria to FTN/HTS b/l.   Gait: Deferred.     Data/Labs/Imaging which I personally reviewed.     Labs:    LABS:    LABS:                          13.5   6.95  )-----------( 208      ( 14 Jan 2024 18:06 )             39.8     01-14    143  |  110<H>  |  12  ----------------------------<  82  3.8   |  22  |  0.87    Ca    8.1<L>      14 Jan 2024 07:25          Urinalysis Basic - ( 14 Jan 2024 07:25 )    Color: x / Appearance: x / SG: x / pH: x  Gluc: 82 mg/dL / Ketone: x  / Bili: x / Urobili: x   Blood: x / Protein: x / Nitrite: x   Leuk Esterase: x / RBC: x / WBC x   Sq Epi: x / Non Sq Epi: x / Bacteria: x

## 2024-01-14 NOTE — PROGRESS NOTE ADULT - SUBJECTIVE AND OBJECTIVE BOX
Name of Patient : TAMI DUNHAM  MRN: [id number]  Date of visit: 01-14-24      Subjective: Patient seen and examined. No new events except as noted.     REVIEW OF SYSTEMS:    CONSTITUTIONAL: No weakness, fevers or chills  EYES/ENT: No visual changes;  No vertigo or throat pain   NECK: No pain or stiffness  RESPIRATORY: No cough, wheezing, hemoptysis; No shortness of breath  CARDIOVASCULAR: No chest pain or palpitations  GASTROINTESTINAL: No abdominal or epigastric pain. No nausea, vomiting, or hematemesis; No diarrhea or constipation. No melena or hematochezia.  GENITOURINARY: No dysuria, frequency or hematuria  NEUROLOGICAL: No numbness or weakness  SKIN: No itching, burning, rashes, or lesions   All other review of systems is negative unless indicated above.    MEDICATIONS:  MEDICATIONS  (STANDING):  aspirin enteric coated 81 milliGRAM(s) Oral daily  atorvastatin 20 milliGRAM(s) Oral at bedtime  enoxaparin Injectable 100 milliGRAM(s) SubCutaneous every 12 hours  gabapentin 300 milliGRAM(s) Oral two times a day  lacosamide 150 milliGRAM(s) Oral two times a day  multivitamin 1 Tablet(s) Oral daily  pantoprazole    Tablet 40 milliGRAM(s) Oral before breakfast  sodium chloride 0.9%. 1000 milliLiter(s) (50 mL/Hr) IV Continuous <Continuous>  tamsulosin 0.4 milliGRAM(s) Oral at bedtime      PHYSICAL EXAM:  T(C): 36.4 (01-14-24 @ 20:52), Max: 36.7 (01-14-24 @ 18:05)  HR: 63 (01-14-24 @ 20:52) (63 - 73)  BP: 120/76 (01-14-24 @ 20:52) (101/60 - 131/88)  RR: 18 (01-14-24 @ 20:52) (18 - 18)  SpO2: 95% (01-14-24 @ 20:52) (95% - 97%)  Wt(kg): --  I&O's Summary    13 Jan 2024 07:01  -  14 Jan 2024 07:00  --------------------------------------------------------  IN: 0 mL / OUT: 900 mL / NET: -900 mL    14 Jan 2024 07:01  -  14 Jan 2024 23:54  --------------------------------------------------------  IN: 1030 mL / OUT: 602 mL / NET: 428 mL          Appearance: Normal	  HEENT:  PERRLA   Lymphatic: No lymphadenopathy   Cardiovascular: Normal S1 S2, no JVD  Respiratory: normal effort , clear  Gastrointestinal:  Soft, Non-tender  Skin: No rashes,  warm to touch  Psychiatry:  Mood & affect appropriate  Musculuskeletal: No edema    recent labs, Imaging and EKGs personally reviewed     01-13-24 @ 07:01  -  01-14-24 @ 07:00  --------------------------------------------------------  IN: 0 mL / OUT: 900 mL / NET: -900 mL    01-14-24 @ 07:01  -  01-14-24 @ 23:54  --------------------------------------------------------  IN: 1030 mL / OUT: 602 mL / NET: 428 mL                          13.5   6.95  )-----------( 208      ( 14 Jan 2024 18:06 )             39.8               01-14    143  |  110<H>  |  12  ----------------------------<  82  3.8   |  22  |  0.87    Ca    8.1<L>      14 Jan 2024 07:25                         Urinalysis Basic - ( 14 Jan 2024 07:25 )    Color: x / Appearance: x / SG: x / pH: x  Gluc: 82 mg/dL / Ketone: x  / Bili: x / Urobili: x   Blood: x / Protein: x / Nitrite: x   Leuk Esterase: x / RBC: x / WBC x   Sq Epi: x / Non Sq Epi: x / Bacteria: x               Name of Patient : TAMI DUNHAM  MRN: [id number]  Date of visit: 01-14-24      Subjective: Patient seen and examined. No new events except as noted.     REVIEW OF SYSTEMS:    CONSTITUTIONAL: No weakness, fevers or chills  EYES/ENT: No visual changes;  No vertigo or throat pain   NECK: No pain or stiffness  RESPIRATORY: No cough, wheezing, hemoptysis; No shortness of breath  CARDIOVASCULAR: No chest pain or palpitations  GASTROINTESTINAL: No abdominal or epigastric pain. No nausea, vomiting, or hematemesis; No diarrhea or constipation. No melena or hematochezia.  GENITOURINARY: No dysuria, frequency or hematuria  NEUROLOGICAL: No numbness or weakness  SKIN: No itching, burning, rashes, or lesions   All other review of systems is negative unless indicated above.    MEDICATIONS:  MEDICATIONS  (STANDING):  aspirin enteric coated 81 milliGRAM(s) Oral daily  atorvastatin 20 milliGRAM(s) Oral at bedtime  enoxaparin Injectable 100 milliGRAM(s) SubCutaneous every 12 hours  gabapentin 300 milliGRAM(s) Oral two times a day  lacosamide 150 milliGRAM(s) Oral two times a day  multivitamin 1 Tablet(s) Oral daily  pantoprazole    Tablet 40 milliGRAM(s) Oral before breakfast  sodium chloride 0.9%. 1000 milliLiter(s) (50 mL/Hr) IV Continuous <Continuous>  tamsulosin 0.4 milliGRAM(s) Oral at bedtime      PHYSICAL EXAM:  T(C): 36.4 (01-14-24 @ 20:52), Max: 36.7 (01-14-24 @ 18:05)  HR: 63 (01-14-24 @ 20:52) (63 - 73)  BP: 120/76 (01-14-24 @ 20:52) (101/60 - 131/88)  RR: 18 (01-14-24 @ 20:52) (18 - 18)  SpO2: 95% (01-14-24 @ 20:52) (95% - 97%)  Wt(kg): --  I&O's Summary    13 Jan 2024 07:01  -  14 Jan 2024 07:00  --------------------------------------------------------  IN: 0 mL / OUT: 900 mL / NET: -900 mL    14 Jan 2024 07:01  -  14 Jan 2024 23:54  --------------------------------------------------------  IN: 1030 mL / OUT: 602 mL / NET: 428 mL          Appearance: Normal	  HEENT:  PERRLA   Lymphatic: No lymphadenopathy   Cardiovascular: Normal S1 S2, no JVD  Respiratory: normal effort , clear  Gastrointestinal:  Soft, Non-tender  Skin: No rashes,  warm to touch  Psychiatry:  Mood & affect appropriate  Musculuskeletal: No edema    recent labs, Imaging and EKGs personally reviewed     01-13-24 @ 07:01  -  01-14-24 @ 07:00  --------------------------------------------------------  IN: 0 mL / OUT: 900 mL / NET: -900 mL    01-14-24 @ 07:01  -  01-14-24 @ 23:54  --------------------------------------------------------  IN: 1030 mL / OUT: 602 mL / NET: 428 mL                          13.5   6.95  )-----------( 208      ( 14 Jan 2024 18:06 )             39.8               01-14    143  |  110<H>  |  12  ----------------------------<  82  3.8   |  22  |  0.87    Ca    8.1<L>      14 Jan 2024 07:25                         Urinalysis Basic - ( 14 Jan 2024 07:25 )    Color: x / Appearance: x / SG: x / pH: x  Gluc: 82 mg/dL / Ketone: x  / Bili: x / Urobili: x   Blood: x / Protein: x / Nitrite: x   Leuk Esterase: x / RBC: x / WBC x   Sq Epi: x / Non Sq Epi: x / Bacteria: x               Name of Patient : TAMI DUNHAM  MRN: [id number]  Date of visit: 01-14-24      Subjective: Patient seen and examined. No new events except as noted.     REVIEW OF SYSTEMS:    CONSTITUTIONAL: No weakness, fevers or chills  EYES/ENT: No visual changes;  No vertigo or throat pain   NECK: No pain or stiffness  RESPIRATORY: No cough, wheezing, hemoptysis; No shortness of breath  CARDIOVASCULAR: No chest pain or palpitations  GASTROINTESTINAL: No abdominal or epigastric pain. No nausea, vomiting, or hematemesis; No diarrhea or constipation. No melena or hematochezia.  GENITOURINARY: No dysuria, frequency or hematuria  NEUROLOGICAL: No numbness or weakness  SKIN: No itching, burning, rashes, or lesions   All other review of systems is negative unless indicated above.    MEDICATIONS:  MEDICATIONS  (STANDING):  aspirin enteric coated 81 milliGRAM(s) Oral daily  atorvastatin 20 milliGRAM(s) Oral at bedtime  enoxaparin Injectable 100 milliGRAM(s) SubCutaneous every 12 hours  gabapentin 300 milliGRAM(s) Oral two times a day  lacosamide 150 milliGRAM(s) Oral two times a day  multivitamin 1 Tablet(s) Oral daily  pantoprazole    Tablet 40 milliGRAM(s) Oral before breakfast  sodium chloride 0.9%. 1000 milliLiter(s) (50 mL/Hr) IV Continuous <Continuous>  tamsulosin 0.4 milliGRAM(s) Oral at bedtime      PHYSICAL EXAM:  T(C): 36.4 (01-14-24 @ 20:52), Max: 36.7 (01-14-24 @ 18:05)  HR: 63 (01-14-24 @ 20:52) (63 - 73)  BP: 120/76 (01-14-24 @ 20:52) (101/60 - 131/88)  RR: 18 (01-14-24 @ 20:52) (18 - 18)  SpO2: 95% (01-14-24 @ 20:52) (95% - 97%)  Wt(kg): --  I&O's Summary    13 Jan 2024 07:01  -  14 Jan 2024 07:00  --------------------------------------------------------  IN: 0 mL / OUT: 900 mL / NET: -900 mL    14 Jan 2024 07:01  -  14 Jan 2024 23:54  --------------------------------------------------------  IN: 1030 mL / OUT: 602 mL / NET: 428 mL          Appearance: Normal	  HEENT:  PERRLA   Lymphatic: No lymphadenopathy   Cardiovascular: Normal S1 S2, no JVD  Respiratory: normal effort , clear  Gastrointestinal:  Soft, Non-tender  Skin: No rashes,  warm to touch  Psychiatry:  Mood & affect appropriate  Musculuskeletal: No edema    recent labs, Imaging and EKGs personally reviewed     01-13-24 @ 07:01  -  01-14-24 @ 07:00  --------------------------------------------------------  IN: 0 mL / OUT: 900 mL / NET: -900 mL    01-14-24 @ 07:01  -  01-14-24 @ 23:54  --------------------------------------------------------  IN: 1030 mL / OUT: 602 mL / NET: 428 mL                          13.5   6.95  )-----------( 208      ( 14 Jan 2024 18:06 )             39.8               01-14    143  |  110<H>  |  12  ----------------------------<  82  3.8   |  22  |  0.87    Ca    8.1<L>      14 Jan 2024 07:25                         Urinalysis Basic - ( 14 Jan 2024 07:25 )    Color: x / Appearance: x / SG: x / pH: x  Gluc: 82 mg/dL / Ketone: x  / Bili: x / Urobili: x   Blood: x / Protein: x / Nitrite: x   Leuk Esterase: x / RBC: x / WBC x   Sq Epi: x / Non Sq Epi: x / Bacteria: x

## 2024-01-14 NOTE — PROGRESS NOTE ADULT - ASSESSMENT
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 Patient reports that when his hematuria began, he attributed it to increase intake of cranberry juice. Patient states that when his hematuria did not subside, he presented to his outpatient Urologist's office for which he was started on Cipro for UTI and only took one dose. Patient also endorses left flank pain. Patient denies history of nephrolithiasis or previous episodes of hematuria. Patient denies dysuria, frequency or urgency, chills, body aches, nausea, vomiting, headache, lightheadedness or dizziness.       Hematuria   - Pt reports last dose of Lovenox injection was 11/09 AM prior to arrival   - Hgb stable, however with gross hematuria and hemorrhage seen on CT   - Check CBC BID - Monitor H/H and Hgb closely --> F/u Repeat CBC, mild drop in Hgb - stable on repeat   - resume full dose AC< if recurrent hematuria, will follow up with urology. may need IR guided embolization    - Urology evaluation appreciated; F/u recs    UTI   - With associated flank pain - now resolved. UA grossly positive.    - S/P course of Rocephin. UCx --> negative   - Hydration as tolerated   - Monitor CBC, Temp Curve, VS and patient closely     Hydronephrosis   - CT w/ mild L hydronephrosis with abrupt tapering of the dilated renal collecting system at the level of the UPJ, Hyperdense material within the dilated L upper renal pole most likely representing hemorrhage, Moderate L Perinephric/ periureteric stranding, no calculi identified   - C/w Tamsulosin; Monitor I and O   - As per Urology, patient will require close outpatient follow up and work up for UPJ obstruction   - Urology eval appreciated; F/u recs    Hemorrhage   - Seen on CT as above  - Check serial CBC BID   - Duplex with possible soleal muscle hematoma ? -- Monitor H/H   - Monitor H/H closely. Transfuse for Hgb < 7.0   - Urology eval appreciated; F/u recs    DVT  - LE Duplex w/ B/L soleal vein and right peroneal vein thrombus, Acute DVT below the knee    - On Lovenox for  Mg BID, now on hold in the setting of hematuria. Resume as able to   - Vascular Cardiology eval appreciated; F/u recs --> Duplex 1/12 with resolution of DVT   - Hematology eval appreciated; F/u recs     Seizure   - Per history   - C/w Vimpat 150 BID, off of Keppra as per Neuro.   - Seizure precautions  - Neuro eval appreciated; F/u recs    CVA  - Per History   - On ASA, Lovenox and Statin  - Neuro eval appreciated     ADHD  - Vyvanse 50 Mg not available in patient. Resume on DC  - Fall, Seizure, Aspiration precautions  - Neuro eval appreciated; F/u recs     LLE Fasciotomy   - 2/2 compartment syndrome.   - Vascular surgery eval appreciated; F/u recs --> No acute intervention at this time     HLD  - Lipid panel noted; C/w Lipitor 20    Vertigo  - Per History. 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 Patient reports that when his hematuria began, he attributed it to increase intake of cranberry juice. Patient states that when his hematuria did not subside, he presented to his outpatient Urologist's office for which he was started on Cipro for UTI and only took one dose. Patient also endorses left flank pain. Patient denies history of nephrolithiasis or previous episodes of hematuria. Patient denies dysuria, frequency or urgency, chills, body aches, nausea, vomiting, headache, lightheadedness or dizziness.       Hematuria   - Pt reports last dose of Lovenox injection was 11/09 AM prior to arrival   - Hgb stable, however with gross hematuria and hemorrhage seen on CT   - Check CBC BID - Monitor H/H and Hgb closely --> F/u Repeat CBC, mild drop in Hgb - stable on repeat   - resume full dose AC< if recurrent hematuria, will follow up with urology. may need IR guided embolization    - Urology evaluation appreciated; F/u recs    UTI   - With associated flank pain - now resolved. UA grossly positive.    - S/P course of Rocephin. UCx --> negative   - Hydration as tolerated   - Monitor CBC, Temp Curve, VS and patient closely     Hydronephrosis   - CT w/ mild L hydronephrosis with abrupt tapering of the dilated renal collecting system at the level of the UPJ, Hyperdense material within the dilated L upper renal pole most likely representing hemorrhage, Moderate L Perinephric/ periureteric stranding, no calculi identified   - C/w Tamsulosin; Monitor I and O   - As per Urology, patient will require close outpatient follow up and work up for UPJ obstruction   - Urology eval appreciated; F/u recs    Hemorrhage   - Seen on CT as above  - Check serial CBC BID   - Duplex with possible soleal muscle hematoma ? -- Monitor H/H   - Monitor H/H closely. Transfuse for Hgb < 7.0   - Urology eval appreciated; F/u recs    DVT  - LE Duplex w/ B/L soleal vein and right peroneal vein thrombus, Acute DVT below the knee    - On Lovenox for  Mg BID, now on hold in the setting of hematuria. Resume as able to   - Vascular Cardiology eval appreciated; F/u recs --> Duplex 1/12 with resolution of DVT   - Hematology eval appreciated; F/u recs     Seizure   - Per history   - C/w Vimpat 150 BID, off of Keppra as per Neuro.   - Seizure precautions  - Neuro eval appreciated; F/u recs    CVA  - Per History   - On ASA, Lovenox and Statin  - Neuro eval appreciated     ADHD  - Vyvanse 50 Mg not available in patient. Resume on DC  - Fall, Seizure, Aspiration precautions  - Neuro eval appreciated; F/u recs     LLE Fasciotomy   - 2/2 compartment syndrome.   - Vascular surgery eval appreciated; F/u recs --> No acute intervention at this time     HLD  - Lipid panel noted; C/w Lipitor 20    Vertigo  - Per History. 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 Patient reports that when his hematuria began, he attributed it to increase intake of cranberry juice. Patient states that when his hematuria did not subside, he presented to his outpatient Urologist's office for which he was started on Cipro for UTI and only took one dose. Patient also endorses left flank pain. Patient denies history of nephrolithiasis or previous episodes of hematuria. Patient denies dysuria, frequency or urgency, chills, body aches, nausea, vomiting, headache, lightheadedness or dizziness.       Hematuria   - Pt reports last dose of Lovenox injection was 11/09 AM prior to arrival   - Hgb stable, however with gross hematuria and hemorrhage seen on CT   - Check CBC BID - Monitor H/H and Hgb closely --> F/u Repeat CBC, mild drop in Hgb - stable on repeat   - resume full dose AC< if recurrent hematuria, will follow up with urology. may need IR guided embolization    - Urology evaluation appreciated; F/u recs    UTI   - With associated flank pain - now resolved. UA grossly positive.    - S/P course of Rocephin. UCx --> negative   - Hydration as tolerated   - Monitor CBC, Temp Curve, VS and patient closely     Hydronephrosis   - CT w/ mild L hydronephrosis with abrupt tapering of the dilated renal collecting system at the level of the UPJ, Hyperdense material within the dilated L upper renal pole most likely representing hemorrhage, Moderate L Perinephric/ periureteric stranding, no calculi identified   - C/w Tamsulosin; Monitor I and O   - As per Urology, patient will require close outpatient follow up and work up for UPJ obstruction   - Urology eval appreciated; F/u recs    Hemorrhage   - Seen on CT as above  - Check serial CBC BID   - Duplex with possible soleal muscle hematoma ? -- Monitor H/H   - Monitor H/H closely. Transfuse for Hgb < 7.0   - Urology eval appreciated; F/u recs    DVT  - LE Duplex w/ B/L soleal vein and right peroneal vein thrombus, Acute DVT below the knee    - On Lovenox for  Mg BID, now on hold in the setting of hematuria. Resume as able to   - Vascular Cardiology eval appreciated; F/u recs --> Duplex 1/12 with resolution of DVT   - Hematology eval appreciated; F/u recs     Seizure   - Per history   - C/w Vimpat 150 BID, off of Keppra as per Neuro.   - Seizure precautions  - Neuro eval appreciated; F/u recs    CVA  - Per History   - On ASA, Lovenox and Statin  - Neuro eval appreciated     ADHD  - Vyvanse 50 Mg not available in patient. Resume on DC  - Fall, Seizure, Aspiration precautions  - Neuro eval appreciated; F/u recs     LLE Fasciotomy   - 2/2 compartment syndrome.   - Vascular surgery eval appreciated; F/u recs --> No acute intervention at this time     HLD  - Lipid panel noted; C/w Lipitor 20    Vertigo  - Per History. No longer on Meclizine per patient  - Fall, Seizure and Aspiration precautions   - Ambulate with Assistance  - PT eval     PPX  - Lovenox   - PPI

## 2024-01-14 NOTE — PROGRESS NOTE ADULT - ASSESSMENT
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  Saw his urologist  was started on an antibioti. Patient also reports several episodes of severe intermittent left flank pain today, denies history of kidney stones to his knowledge.  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  - statin therapy with LDL goal < 70mg/dL; atorvastatin 20 at home   - f/u  for flank pain, hematuria and obstruction   - abx for UTI   - PT/OT   - meclizion prn for vertigo   - check FS, glucose control <180  - GI/DVT    - Thank you for allowing me to participate in the care of this patient. Call with questions.   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  Saw his urologist  was started on an antibioti. Patient also reports several episodes of severe intermittent left flank pain today, denies history of kidney stones to his knowledge.  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  - statin therapy with LDL goal < 70mg/dL; atorvastatin 20 at home   - f/u  for flank pain, hematuria and obstruction   - abx for UTI   - PT/OT   - meclizion prn for vertigo   - check FS, glucose control <180  - GI/DVT    - Thank you for allowing me to participate in the care of this patient. Call with questions.   known to me from office   Braxton Forde MD  Vascular Neurology  Office: [phone]

## 2024-01-14 NOTE — CHART NOTE - NSCHARTNOTEFT_GEN_A_CORE
53 year old male with bilateral lower extremity below knee DVTs. No limb threatening symptoms at this time - no phlegmasia or vascular compromise. No symptoms or physical exam findings of PAD. Has history of LLE DVT causing compartment syndrome requiring fasciotomy. On AC at home (Lovenox managed by hematology).     -Repeat duplex w/o evidence of LE DVTs  -Patient may follow up outpatient with Dr. Merchant  -Please re-consult vascular surgery with any additional questions    Vascular Surgery [phone] 53 year old male with bilateral lower extremity below knee DVTs. No limb threatening symptoms at this time - no phlegmasia or vascular compromise. No symptoms or physical exam findings of PAD. Has history of LLE DVT causing compartment syndrome requiring fasciotomy. On AC at home (Lovenox managed by hematology).     -Repeat duplex w/o evidence of LE DVTs  -Patient may follow up outpatient with Dr. Merchant  -Please re-consult vascular surgery with any additional questions    Vascular Surgery [phone]

## 2024-01-15 LAB
ANION GAP SERPL CALC-SCNC: 11 MMOL/L — SIGNIFICANT CHANGE UP (ref 5–17)
ANION GAP SERPL CALC-SCNC: 11 MMOL/L — SIGNIFICANT CHANGE UP (ref 5–17)
BASOPHILS # BLD AUTO: 0.07 K/UL — SIGNIFICANT CHANGE UP (ref 0–0.2)
BASOPHILS # BLD AUTO: 0.07 K/UL — SIGNIFICANT CHANGE UP (ref 0–0.2)
BASOPHILS NFR BLD AUTO: 1.1 % — SIGNIFICANT CHANGE UP (ref 0–2)
BASOPHILS NFR BLD AUTO: 1.1 % — SIGNIFICANT CHANGE UP (ref 0–2)
BUN SERPL-MCNC: 16 MG/DL — SIGNIFICANT CHANGE UP (ref 7–23)
BUN SERPL-MCNC: 16 MG/DL — SIGNIFICANT CHANGE UP (ref 7–23)
CALCIUM SERPL-MCNC: 8.3 MG/DL — LOW (ref 8.4–10.5)
CALCIUM SERPL-MCNC: 8.3 MG/DL — LOW (ref 8.4–10.5)
CHLORIDE SERPL-SCNC: 110 MMOL/L — HIGH (ref 96–108)
CHLORIDE SERPL-SCNC: 110 MMOL/L — HIGH (ref 96–108)
CO2 SERPL-SCNC: 22 MMOL/L — SIGNIFICANT CHANGE UP (ref 22–31)
CO2 SERPL-SCNC: 22 MMOL/L — SIGNIFICANT CHANGE UP (ref 22–31)
CREAT SERPL-MCNC: 0.98 MG/DL — SIGNIFICANT CHANGE UP (ref 0.5–1.3)
CREAT SERPL-MCNC: 0.98 MG/DL — SIGNIFICANT CHANGE UP (ref 0.5–1.3)
EGFR: 92 ML/MIN/1.73M2 — SIGNIFICANT CHANGE UP
EGFR: 92 ML/MIN/1.73M2 — SIGNIFICANT CHANGE UP
EOSINOPHIL # BLD AUTO: 0.36 K/UL — SIGNIFICANT CHANGE UP (ref 0–0.5)
EOSINOPHIL # BLD AUTO: 0.36 K/UL — SIGNIFICANT CHANGE UP (ref 0–0.5)
EOSINOPHIL NFR BLD AUTO: 5.7 % — SIGNIFICANT CHANGE UP (ref 0–6)
EOSINOPHIL NFR BLD AUTO: 5.7 % — SIGNIFICANT CHANGE UP (ref 0–6)
GLUCOSE SERPL-MCNC: 85 MG/DL — SIGNIFICANT CHANGE UP (ref 70–99)
GLUCOSE SERPL-MCNC: 85 MG/DL — SIGNIFICANT CHANGE UP (ref 70–99)
HCT VFR BLD CALC: 36.6 % — LOW (ref 39–50)
HCT VFR BLD CALC: 36.6 % — LOW (ref 39–50)
HCT VFR BLD CALC: 37.4 % — LOW (ref 39–50)
HGB BLD-MCNC: 11.9 G/DL — LOW (ref 13–17)
HGB BLD-MCNC: 11.9 G/DL — LOW (ref 13–17)
HGB BLD-MCNC: 12.7 G/DL — LOW (ref 13–17)
IMM GRANULOCYTES NFR BLD AUTO: 0.6 % — SIGNIFICANT CHANGE UP (ref 0–0.9)
IMM GRANULOCYTES NFR BLD AUTO: 0.6 % — SIGNIFICANT CHANGE UP (ref 0–0.9)
LYMPHOCYTES # BLD AUTO: 1.52 K/UL — SIGNIFICANT CHANGE UP (ref 1–3.3)
LYMPHOCYTES # BLD AUTO: 1.52 K/UL — SIGNIFICANT CHANGE UP (ref 1–3.3)
LYMPHOCYTES # BLD AUTO: 24 % — SIGNIFICANT CHANGE UP (ref 13–44)
LYMPHOCYTES # BLD AUTO: 24 % — SIGNIFICANT CHANGE UP (ref 13–44)
MAGNESIUM SERPL-MCNC: 2.1 MG/DL — SIGNIFICANT CHANGE UP (ref 1.6–2.6)
MAGNESIUM SERPL-MCNC: 2.1 MG/DL — SIGNIFICANT CHANGE UP (ref 1.6–2.6)
MCHC RBC-ENTMCNC: 29.2 PG — SIGNIFICANT CHANGE UP (ref 27–34)
MCHC RBC-ENTMCNC: 29.2 PG — SIGNIFICANT CHANGE UP (ref 27–34)
MCHC RBC-ENTMCNC: 30 PG — SIGNIFICANT CHANGE UP (ref 27–34)
MCHC RBC-ENTMCNC: 32.5 GM/DL — SIGNIFICANT CHANGE UP (ref 32–36)
MCHC RBC-ENTMCNC: 32.5 GM/DL — SIGNIFICANT CHANGE UP (ref 32–36)
MCHC RBC-ENTMCNC: 34 GM/DL — SIGNIFICANT CHANGE UP (ref 32–36)
MCV RBC AUTO: 88.2 FL — SIGNIFICANT CHANGE UP (ref 80–100)
MCV RBC AUTO: 89.7 FL — SIGNIFICANT CHANGE UP (ref 80–100)
MCV RBC AUTO: 89.7 FL — SIGNIFICANT CHANGE UP (ref 80–100)
MONOCYTES # BLD AUTO: 0.63 K/UL — SIGNIFICANT CHANGE UP (ref 0–0.9)
MONOCYTES # BLD AUTO: 0.63 K/UL — SIGNIFICANT CHANGE UP (ref 0–0.9)
MONOCYTES NFR BLD AUTO: 10 % — SIGNIFICANT CHANGE UP (ref 2–14)
MONOCYTES NFR BLD AUTO: 10 % — SIGNIFICANT CHANGE UP (ref 2–14)
NEUTROPHILS # BLD AUTO: 3.71 K/UL — SIGNIFICANT CHANGE UP (ref 1.8–7.4)
NEUTROPHILS # BLD AUTO: 3.71 K/UL — SIGNIFICANT CHANGE UP (ref 1.8–7.4)
NEUTROPHILS NFR BLD AUTO: 58.6 % — SIGNIFICANT CHANGE UP (ref 43–77)
NEUTROPHILS NFR BLD AUTO: 58.6 % — SIGNIFICANT CHANGE UP (ref 43–77)
NRBC # BLD: 0 /100 WBCS — SIGNIFICANT CHANGE UP (ref 0–0)
PHOSPHATE SERPL-MCNC: 3.5 MG/DL — SIGNIFICANT CHANGE UP (ref 2.5–4.5)
PHOSPHATE SERPL-MCNC: 3.5 MG/DL — SIGNIFICANT CHANGE UP (ref 2.5–4.5)
PLATELET # BLD AUTO: 175 K/UL — SIGNIFICANT CHANGE UP (ref 150–400)
PLATELET # BLD AUTO: 175 K/UL — SIGNIFICANT CHANGE UP (ref 150–400)
PLATELET # BLD AUTO: 185 K/UL — SIGNIFICANT CHANGE UP (ref 150–400)
POTASSIUM SERPL-MCNC: 3.8 MMOL/L — SIGNIFICANT CHANGE UP (ref 3.5–5.3)
POTASSIUM SERPL-MCNC: 3.8 MMOL/L — SIGNIFICANT CHANGE UP (ref 3.5–5.3)
POTASSIUM SERPL-SCNC: 3.8 MMOL/L — SIGNIFICANT CHANGE UP (ref 3.5–5.3)
POTASSIUM SERPL-SCNC: 3.8 MMOL/L — SIGNIFICANT CHANGE UP (ref 3.5–5.3)
RBC # BLD: 4.08 M/UL — LOW (ref 4.2–5.8)
RBC # BLD: 4.08 M/UL — LOW (ref 4.2–5.8)
RBC # BLD: 4.24 M/UL — SIGNIFICANT CHANGE UP (ref 4.2–5.8)
RBC # FLD: 12.7 % — SIGNIFICANT CHANGE UP (ref 10.3–14.5)
RBC # FLD: 12.7 % — SIGNIFICANT CHANGE UP (ref 10.3–14.5)
RBC # FLD: 12.8 % — SIGNIFICANT CHANGE UP (ref 10.3–14.5)
SODIUM SERPL-SCNC: 143 MMOL/L — SIGNIFICANT CHANGE UP (ref 135–145)
SODIUM SERPL-SCNC: 143 MMOL/L — SIGNIFICANT CHANGE UP (ref 135–145)
WBC # BLD: 5.41 K/UL — SIGNIFICANT CHANGE UP (ref 3.8–10.5)
WBC # BLD: 6.33 K/UL — SIGNIFICANT CHANGE UP (ref 3.8–10.5)
WBC # BLD: 6.33 K/UL — SIGNIFICANT CHANGE UP (ref 3.8–10.5)
WBC # FLD AUTO: 5.41 K/UL — SIGNIFICANT CHANGE UP (ref 3.8–10.5)
WBC # FLD AUTO: 6.33 K/UL — SIGNIFICANT CHANGE UP (ref 3.8–10.5)
WBC # FLD AUTO: 6.33 K/UL — SIGNIFICANT CHANGE UP (ref 3.8–10.5)

## 2024-01-15 PROCEDURE — 99232 SBSQ HOSP IP/OBS MODERATE 35: CPT

## 2024-01-15 PROCEDURE — 74178 CT ABD&PLV WO CNTR FLWD CNTR: CPT | Mod: 26

## 2024-01-15 RX ADMIN — ENOXAPARIN SODIUM 100 MILLIGRAM(S): 100 INJECTION SUBCUTANEOUS at 17:26

## 2024-01-15 RX ADMIN — Medication 81 MILLIGRAM(S): at 11:44

## 2024-01-15 RX ADMIN — Medication 1 TABLET(S): at 11:44

## 2024-01-15 RX ADMIN — GABAPENTIN 300 MILLIGRAM(S): 400 CAPSULE ORAL at 17:26

## 2024-01-15 RX ADMIN — PANTOPRAZOLE SODIUM 40 MILLIGRAM(S): 20 TABLET, DELAYED RELEASE ORAL at 06:01

## 2024-01-15 RX ADMIN — TAMSULOSIN HYDROCHLORIDE 0.4 MILLIGRAM(S): 0.4 CAPSULE ORAL at 21:29

## 2024-01-15 RX ADMIN — LACOSAMIDE 150 MILLIGRAM(S): 50 TABLET ORAL at 06:00

## 2024-01-15 RX ADMIN — LACOSAMIDE 150 MILLIGRAM(S): 50 TABLET ORAL at 17:37

## 2024-01-15 RX ADMIN — GABAPENTIN 300 MILLIGRAM(S): 400 CAPSULE ORAL at 06:00

## 2024-01-15 RX ADMIN — ATORVASTATIN CALCIUM 20 MILLIGRAM(S): 80 TABLET, FILM COATED ORAL at 21:28

## 2024-01-15 RX ADMIN — ENOXAPARIN SODIUM 100 MILLIGRAM(S): 100 INJECTION SUBCUTANEOUS at 06:01

## 2024-01-15 NOTE — PROGRESS NOTE ADULT - ATTENDING COMMENTS
Patient seen and examined. Agree with assessment and plan.    Trend urine color    CT urogram    Doni Walker MD  [address] [facility]  [address]  [place] [address]  [phone] Patient seen and examined. Agree with assessment and plan.    Trend urine color    CT urogram    Doni Walker MD  [address] [facility]  [address]  [place] [address]  [phone] Patient seen and examined. Agree with assessment and plan.    Trend urine color    CT urogram    Doni Walker MD  [address] [facility]  [address]  [place] [address]  [phone]

## 2024-01-15 NOTE — PROGRESS NOTE ADULT - SUBJECTIVE AND OBJECTIVE BOX
Patient is a 53y old  Male who presents with a chief complaint of Hematuria (15 Gerardo 2024 13:15)    Patient seen and examined at bedside  Patient reports blood in his urine    MEDICATIONS  (STANDING):  aspirin enteric coated 81 milliGRAM(s) Oral daily  atorvastatin 20 milliGRAM(s) Oral at bedtime  enoxaparin Injectable 100 milliGRAM(s) SubCutaneous every 12 hours  gabapentin 300 milliGRAM(s) Oral two times a day  lacosamide 150 milliGRAM(s) Oral two times a day  multivitamin 1 Tablet(s) Oral daily  pantoprazole    Tablet 40 milliGRAM(s) Oral before breakfast  sodium chloride 0.9%. 1000 milliLiter(s) (50 mL/Hr) IV Continuous <Continuous>  tamsulosin 0.4 milliGRAM(s) Oral at bedtime    MEDICATIONS  (PRN):      Vital Signs Last 24 Hrs  T(C): 36.4 (15 Gerardo 2024 12:57), Max: 36.7 (14 Jan 2024 18:05)  T(F): 97.6 (15 Gerardo 2024 12:57), Max: 98.1 (15 Gerardo 2024 00:13)  HR: 61 (15 Gerardo 2024 12:57) (61 - 68)  BP: 102/65 (15 Gerardo 2024 12:57) (100/62 - 131/88)  BP(mean): --  RR: 18 (15 Gerardo 2024 12:57) (18 - 18)  SpO2: 95% (15 Gerardo 2024 12:57) (95% - 97%)    Parameters below as of 15 Gerardo 2024 12:57  Patient On (Oxygen Delivery Method): room air        PE  Awake, alert  Anicteric, MMM  RRR  CTAB  Abd soft, NT, ND  No c/c/e  No rash grossly  FROM                          11.9   6.33  )-----------( 175      ( 15 Gerardo 2024 07:08 )             36.6       01-15    143  |  110<H>  |  16  ----------------------------<  85  3.8   |  22  |  0.98    Ca    8.3<L>      15 Gerardo 2024 07:08  Phos  3.5     01-15  Mg     2.1     01-15

## 2024-01-15 NOTE — PROGRESS NOTE ADULT - ASSESSMENT
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 CT abd pelvis showed mild L hydro to the level of the UPJ with associated blood products in the collecting system. Labs showed WBC 13.60, Hct 42.5, Cr 1.14. UA pending. PVR 55cc.    Recs:  -Please rack urine and save samples so we can continue to assess color changes  -Can continue AC for now  -trend h/h - stable  -continue hydration and monitor urine color  -Please obtain CT urogram to better assess collecting system  -Please obtain PVR to ensure patient is emptying bladder adequately  -F/u IR recs  -patient will ultimately need close outpatient follow up/work up for UPJ obstruction  -Discussed with Dr. 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 CT abd pelvis showed mild L hydro to the level of the UPJ with associated blood products in the collecting system. Labs showed WBC 13.60, Hct 42.5, Cr 1.14. UA pending. PVR 55cc.    Recs:  -Please rack urine and save samples so we can continue to assess color changes  -Can continue AC for now  -trend h/h - stable  -continue hydration and monitor urine color  -Please obtain CT urogram to better assess collecting system  -Please obtain PVR to ensure patient is emptying bladder adequately  -F/u IR recs  -patient will ultimately need close outpatient follow up/work up for UPJ obstruction  -Discussed with Dr. 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 ED on 1/9/2024 for gross hematuria and intermittent suprapubic and L flank pain. CT abd pelvis showed mild L hydro to the level of the UPJ with associated blood products in the collecting system. Labs showed WBC 13.60, Hct 42.5, Cr 1.14. UA pending. PVR 55cc.    Recs:  -Please rack urine and save samples so we can continue to assess color changes  -Can continue AC for now  -trend h/h - stable  -continue hydration and monitor urine color  -Please obtain CT urogram to better assess collecting system  -Please obtain PVR to ensure patient is emptying bladder adequately  -F/u IR recs  -patient will ultimately need close outpatient follow up/work up for UPJ obstruction  -Discussed with Dr. 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 CT abd pelvis showed mild L hydro to the level of the UPJ with associated blood products in the collecting system. Labs showed WBC 13.60, Hct 42.5, Cr 1.14. UA pending. PVR 55cc.    Recs:  -Please rack urine and save samples so we can continue to assess color changes  -Can continue AC for now  -trend h/h - stable  -continue hydration and monitor urine color  -Please obtain CT urogram to better assess collecting system  -Please obtain PVR to ensure patient is emptying bladder adequately  -F/u IR recs  -patient will ultimately need close outpatient follow up/work up for UPJ obstruction  -Discussed with Dr. 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 CT abd pelvis showed mild L hydro to the level of the UPJ with associated blood products in the collecting system. Labs showed WBC 13.60, Hct 42.5, Cr 1.14. UA pending. PVR 55cc.    Recs:  -Please rack urine and save samples so we can continue to assess color changes  -Can continue AC for now  -trend h/h - stable  -continue hydration and monitor urine color  -Please obtain CT urogram to better assess collecting system  -Please obtain PVR to ensure patient is emptying bladder adequately  -F/u IR recs  -patient will ultimately need close outpatient follow up/work up for UPJ obstruction  -Discussed with Dr. 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 CT abd pelvis showed mild L hydro to the level of the UPJ with associated blood products in the collecting system. Labs showed WBC 13.60, Hct 42.5, Cr 1.14. UA pending. PVR 55cc.    Recs:  -Please rack urine and save samples so we can continue to assess color changes  -Can continue AC for now  -trend h/h - stable  -continue hydration and monitor urine color  -Please obtain CT urogram to better assess collecting system  -Please obtain PVR to ensure patient is emptying bladder adequately  -F/u IR recs  -patient will ultimately need close outpatient follow up/work up for UPJ obstruction  -Discussed with Dr. Aaron Dumont [place] for [facility]  [address] [place] [address] [phone]

## 2024-01-15 NOTE — PROGRESS NOTE ADULT - SUBJECTIVE AND OBJECTIVE BOX
Subjective  Patient seen and examined. Resting comfortably. States that hematuria began recurring overnight. Denies any issues with emptying bladder. States that he has seen one or two small clots in the urine.     Objective    Vital signs  T(F): , Max: 98.1 (01-15-24 @ 00:13)  HR: 64 (01-15-24 @ 05:27)  BP: 114/71 (01-15-24 @ 05:27)  SpO2: 97% (01-15-24 @ 05:27)  Wt(kg): --    Output     OUT:    Voided (mL): 1350 mL  Total OUT: 1350 mL    Total NET: -1350 mL      OUT:    Voided (mL): 850 mL  Total OUT: 850 mL    Total NET: -850 mL    Gen: NAD  Abd: soft, nontender, nondistended  : Voiding dark red urine      Labs      01-15 @ 07:08    WBC 6.33  / Hct 36.6  / SCr 0.98     01-14 @ 18:06    WBC 6.95  / Hct 39.8  / SCr --           Culture - Urine (collected 01-10-24 @ 07:39)  Source: Clean Catch Clean Catch (Midstream)  Final Report (01-11-24 @ 14:08):    <10,000 CFU/mL Normal Urogenital Narda    Culture - Urine (collected 01-10-24 @ 00:41)  Source: Clean Catch Clean Catch (Midstream)  Final Report (01-11-24 @ 08:38):    No growth        Urine Cx: ?  Blood Cx: ?    Imaging

## 2024-01-15 NOTE — PROGRESS NOTE ADULT - ASSESSMENT
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    hx DVT  --patient w/ known history of R LE thrombosis and echogenic linear thrombus in L popliteal vain on b/l LE doppler performed 5/3/23. follow up study done 8/28/23 w/ noted chronic post thrombotic changes but no evidence of DVT  --LE duplex done inpatient on 1/10 w/ Thrombus in the bilateral soleal veins and right peroneal vein.  --outpatient hypercoagulable was negative  --patient previously treated w/ Eliquis but transitioned to Lovenox d/t therapy failure   --will recommend transitioning from Lovenox QD to BID at 1mg/kg  --AC currently on hold d/t hematuria  --vascular surgery consulted, no surgical intervention recommended at this time    hematuria/UTI  --CT a/p w/  Mild left hydronephrosis with abrupt tapering at the ureteropelvic junction. There is evidence of hemorrhage into the dilated left upper   renal pole collecting system and associated inflammatory changes throughout the perinephric fat. 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    hx DVT  --patient w/ known history of R LE thrombosis and echogenic linear thrombus in L popliteal vain on b/l LE doppler performed 5/3/23. follow up study done 8/28/23 w/ noted chronic post thrombotic changes but no evidence of DVT  --LE duplex done inpatient on 1/10 w/ Thrombus in the bilateral soleal veins and right peroneal vein.  --outpatient hypercoagulable was negative  --patient previously treated w/ Eliquis but transitioned to Lovenox d/t therapy failure   --will recommend transitioning from Lovenox QD to BID at 1mg/kg  --AC currently on hold d/t hematuria  --vascular surgery consulted, no surgical intervention recommended at this time    hematuria/UTI  --CT a/p w/  Mild left hydronephrosis with abrupt tapering at the ureteropelvic junction. There is evidence of hemorrhage into the dilated left upper   renal pole collecting system and associated inflammatory changes throughout the perinephric fat. 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 ED on 1/9/2024 for gross hematuria and intermittent suprapubic and L flank pain.    hx DVT  --patient w/ known history of R LE thrombosis and echogenic linear thrombus in L popliteal vain on b/l LE doppler performed 5/3/23. follow up study done 8/28/23 w/ noted chronic post thrombotic changes but no evidence of DVT  --LE duplex done inpatient on 1/10 w/ Thrombus in the bilateral soleal veins and right peroneal vein.  --outpatient hypercoagulable was negative  --patient previously treated w/ Eliquis but transitioned to Lovenox d/t therapy failure   --will recommend transitioning from Lovenox QD to BID at 1mg/kg  --AC currently on hold d/t hematuria  --vascular surgery consulted, no surgical intervention recommended at this time    hematuria/UTI  --CT a/p w/  Mild left hydronephrosis with abrupt tapering at the ureteropelvic junction. There is evidence of hemorrhage into the dilated left upper   renal pole collecting system and associated inflammatory changes throughout the perinephric fat. No radiodense calculi identifie  --on ceftriaxone for UTI/ hydronephrosis   --renal following  --IR consulted for possible embolization  --plan for CT-urogram     will follow,    Luzmaria Xiong NP  Hematology/ Oncology  [facility]  [phone] (office)  [phone] (alt office)  Evenings and weekends please call MD on call or office

## 2024-01-15 NOTE — PROGRESS NOTE ADULT - ASSESSMENT
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  Saw his urologist  was started on an antibioti. Patient also reports several episodes of severe intermittent left flank pain today, denies history of kidney stones to his knowledge.  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  f/u  . f/u H/h    -  AC when restarted ; lovenox restarted but now BID   - for ADHD gets Vyvanse 50mg daily  - for seizure he is on vimpat should now be 150mg BID  and was taken off keppra   - f/u heme/onc, new dvt was on lovenox ; now changed to BID dosing   - for secondary stroke prevention he is on asa 81mg daily and full dose lovenox .  - statin therapy with LDL goal < 70mg/dL; atorvastatin 20 at home   - f/u  for flank pain, hematuria and obstruction   - abx for UTI   - PT/OT   - meclizion prn for vertigo   - check FS, glucose control <180  - GI/DVT    - Thank you for allowing me to participate in the care of this patient. Call with questions.   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  Saw his urologist  was started on an antibioti. Patient also reports several episodes of severe intermittent left flank pain today, denies history of kidney stones to his knowledge.  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  f/u  . f/u H/h    -  AC when restarted ; lovenox restarted but now BID   - for ADHD gets Vyvanse 50mg daily  - for seizure he is on vimpat should now be 150mg BID  and was taken off keppra   - f/u heme/onc, new dvt was on lovenox ; now changed to BID dosing   - for secondary stroke prevention he is on asa 81mg daily and full dose lovenox .  - statin therapy with LDL goal < 70mg/dL; atorvastatin 20 at home   - f/u  for flank pain, hematuria and obstruction   - abx for UTI   - PT/OT   - meclizion prn for vertigo   - check FS, glucose control <180  - GI/DVT    - Thank you for allowing me to participate in the care of this patient. Call with questions.   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  Saw his urologist  was started on an antibioti. Patient also reports several episodes of severe intermittent left flank pain today, denies history of kidney stones to his knowledge.  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  f/u  . f/u H/h    -  AC when restarted ; lovenox restarted but now BID   - for ADHD gets Vyvanse 50mg daily  - for seizure he is on vimpat should now be 150mg BID  and was taken off keppra   - f/u heme/onc, new dvt was on lovenox ; now changed to BID dosing   - for secondary stroke prevention he is on asa 81mg daily and full dose lovenox .  - statin therapy with LDL goal < 70mg/dL; atorvastatin 20 at home   - f/u  for flank pain, hematuria and obstruction   - abx for UTI   - PT/OT   - meclizion prn for vertigo   - check FS, glucose control <180  - GI/DVT    - Thank you for allowing me to participate in the care of this patient. Call with questions.   known to me from office   Braxton Forde MD  Vascular Neurology  Office: [phone]

## 2024-01-15 NOTE — PROGRESS NOTE ADULT - SUBJECTIVE AND OBJECTIVE BOX
Name of Patient : TAMI DUNHAM  MRN: [id number]  Date of visit: 01-15-24 @ 14:34      Subjective: Patient seen and examined. No new events except as noted.   Patient seen earlier this AM alongside with Hematology. Started on full dose Lovenox yesterday.   With worsening hematuria this AM. Urology follow up requested. BAN ren consulted    REVIEW OF SYSTEMS:    CONSTITUTIONAL: Generalized weakness   EYES/ENT: No visual changes;  No vertigo or throat pain   NECK: No pain or stiffness  RESPIRATORY: No cough, wheezing, hemoptysis; No shortness of breath  CARDIOVASCULAR: No chest pain or palpitations  GASTROINTESTINAL: No abdominal or epigastric pain. No nausea, vomiting, or hematemesis; No diarrhea or constipation. No melena or hematochezia.  GENITOURINARY: +Worsening Hematuria   NEUROLOGICAL: + H/O CVA; Residual word finding difficulty  SKIN: + S/P L Fasciotomy; LE skin changes; + Upper thigh ecchymosis at Lovenox inj site; Denies itching   All other review of systems is negative unless indicated above.    MEDICATIONS:  MEDICATIONS  (STANDING):  aspirin enteric coated 81 milliGRAM(s) Oral daily  atorvastatin 20 milliGRAM(s) Oral at bedtime  enoxaparin Injectable 100 milliGRAM(s) SubCutaneous every 12 hours  gabapentin 300 milliGRAM(s) Oral two times a day  lacosamide 150 milliGRAM(s) Oral two times a day  multivitamin 1 Tablet(s) Oral daily  pantoprazole    Tablet 40 milliGRAM(s) Oral before breakfast  sodium chloride 0.9%. 1000 milliLiter(s) (50 mL/Hr) IV Continuous <Continuous>  tamsulosin 0.4 milliGRAM(s) Oral at bedtime      PHYSICAL EXAM:  T(C): 36.4 (01-15-24 @ 12:57), Max: 36.7 (01-14-24 @ 18:05)  HR: 61 (01-15-24 @ 12:57) (61 - 68)  BP: 102/65 (01-15-24 @ 12:57) (100/62 - 131/88)  RR: 18 (01-15-24 @ 12:57) (18 - 18)  SpO2: 95% (01-15-24 @ 12:57) (95% - 97%)  Wt(kg): --  I&O's Summary    14 Jan 2024 07:01  -  15 Gerardo 2024 07:00  --------------------------------------------------------  IN: 1630 mL / OUT: 1352 mL / NET: 278 mL    15 Gerardo 2024 07:01  -  15 Gerardo 2024 14:34  --------------------------------------------------------  IN: 240 mL / OUT: 1250 mL / NET: -1010 mL        Appearance: Awake, Sitting up in bed, in no acute distress   HEENT: Eyes are open   Lymphatic: No lymphadenopathy grossly   Cardiovascular: Normal S1 S2   Respiratory: normal effort, clear  Gastrointestinal:  Soft, Non-tender  Skin: + B/L thigh ecchymosis at Lovenox injection sites  Psychiatry:  Mood & affect appropriate  Musculoskeletal: + Prior LLE Fasciotomy; Chronic skin changes        01-14-24 @ 07:01  -  01-15-24 @ 07:00  --------------------------------------------------------  IN: 1630 mL / OUT: 1352 mL / NET: 278 mL    01-15-24 @ 07:01  -  01-15-24 @ 14:34  --------------------------------------------------------  IN: 240 mL / OUT: 1250 mL / NET: -1010 mL                                  11.9   6.33  )-----------( 175      ( 15 Gerardo 2024 07:08 )             36.6               01-15    143  |  110<H>  |  16  ----------------------------<  85  3.8   |  22  |  0.98    Ca    8.3<L>      15 Gerardo 2024 07:08  Phos  3.5     01-15  Mg     2.1     01-15                         Urinalysis Basic - ( 15 Gerardo 2024 07:08 )    Color: x / Appearance: x / SG: x / pH: x  Gluc: 85 mg/dL / Ketone: x  / Bili: x / Urobili: x   Blood: x / Protein: x / Nitrite: x   Leuk Esterase: x / RBC: x / WBC x   Sq Epi: x / Non Sq Epi: x / Bacteria: x       Name of Patient : TAMI DUNHAM  MRN: [id number]  Date of visit: 01-15-24 @ 14:34      Subjective: Patient seen and examined. No new events except as noted.   Patient seen earlier this AM alongside with Hematology. Started on full dose Lovenox yesterday.   With worsening hematuria this AM. Urology follow up requested. BAN ren consulted    REVIEW OF SYSTEMS:    CONSTITUTIONAL: Generalized weakness   EYES/ENT: No visual changes;  No vertigo or throat pain   NECK: No pain or stiffness  RESPIRATORY: No cough, wheezing, hemoptysis; No shortness of breath  CARDIOVASCULAR: No chest pain or palpitations  GASTROINTESTINAL: No abdominal or epigastric pain. No nausea, vomiting, or hematemesis; No diarrhea or constipation. No melena or hematochezia.  GENITOURINARY: +Worsening Hematuria   NEUROLOGICAL: + H/O CVA; Residual word finding difficulty  SKIN: + S/P L Fasciotomy; LE skin changes; + Upper thigh ecchymosis at Lovenox inj site; Denies itching   All other review of systems is negative unless indicated above.    MEDICATIONS:  MEDICATIONS  (STANDING):  aspirin enteric coated 81 milliGRAM(s) Oral daily  atorvastatin 20 milliGRAM(s) Oral at bedtime  enoxaparin Injectable 100 milliGRAM(s) SubCutaneous every 12 hours  gabapentin 300 milliGRAM(s) Oral two times a day  lacosamide 150 milliGRAM(s) Oral two times a day  multivitamin 1 Tablet(s) Oral daily  pantoprazole    Tablet 40 milliGRAM(s) Oral before breakfast  sodium chloride 0.9%. 1000 milliLiter(s) (50 mL/Hr) IV Continuous <Continuous>  tamsulosin 0.4 milliGRAM(s) Oral at bedtime      PHYSICAL EXAM:  T(C): 36.4 (01-15-24 @ 12:57), Max: 36.7 (01-14-24 @ 18:05)  HR: 61 (01-15-24 @ 12:57) (61 - 68)  BP: 102/65 (01-15-24 @ 12:57) (100/62 - 131/88)  RR: 18 (01-15-24 @ 12:57) (18 - 18)  SpO2: 95% (01-15-24 @ 12:57) (95% - 97%)  Wt(kg): --  I&O's Summary    14 Jan 2024 07:01  -  15 Gerardo 2024 07:00  --------------------------------------------------------  IN: 1630 mL / OUT: 1352 mL / NET: 278 mL    15 Gerardo 2024 07:01  -  15 Gerardo 2024 14:34  --------------------------------------------------------  IN: 240 mL / OUT: 1250 mL / NET: -1010 mL        Appearance: Awake, Sitting up in bed, in no acute distress   HEENT: Eyes are open   Lymphatic: No lymphadenopathy grossly   Cardiovascular: Normal S1 S2   Respiratory: normal effort, clear  Gastrointestinal:  Soft, Non-tender  Skin: + B/L thigh ecchymosis at Lovenox injection sites  Psychiatry:  Mood & affect appropriate  Musculoskeletal: + Prior LLE Fasciotomy; Chronic skin changes        01-14-24 @ 07:01  -  01-15-24 @ 07:00  --------------------------------------------------------  IN: 1630 mL / OUT: 1352 mL / NET: 278 mL    01-15-24 @ 07:01  -  01-15-24 @ 14:34  --------------------------------------------------------  IN: 240 mL / OUT: 1250 mL / NET: -1010 mL                                  11.9   6.33  )-----------( 175      ( 15 Gerardo 2024 07:08 )             36.6               01-15    143  |  110<H>  |  16  ----------------------------<  85  3.8   |  22  |  0.98    Ca    8.3<L>      15 Gerardo 2024 07:08  Phos  3.5     01-15  Mg     2.1     01-15                         Urinalysis Basic - ( 15 Gerardo 2024 07:08 )    Color: x / Appearance: x / SG: x / pH: x  Gluc: 85 mg/dL / Ketone: x  / Bili: x / Urobili: x   Blood: x / Protein: x / Nitrite: x   Leuk Esterase: x / RBC: x / WBC x   Sq Epi: x / Non Sq Epi: x / Bacteria: x       Name of Patient : TAMI DUNHAM  MRN: [id number]  Date of visit: 01-15-24 @ 14:34      Subjective: Patient seen and examined. No new events except as noted.   Patient seen earlier this AM alongside with Hematology. Started on full dose Lovenox yesterday.   With worsening hematuria this AM. Urology follow up requested. BAN ren consulted    REVIEW OF SYSTEMS:    CONSTITUTIONAL: Generalized weakness   EYES/ENT: No visual changes;  No vertigo or throat pain   NECK: No pain or stiffness  RESPIRATORY: No cough, wheezing, hemoptysis; No shortness of breath  CARDIOVASCULAR: No chest pain or palpitations  GASTROINTESTINAL: No abdominal or epigastric pain. No nausea, vomiting, or hematemesis; No diarrhea or constipation. No melena or hematochezia.  GENITOURINARY: +Worsening Hematuria   NEUROLOGICAL: + H/O CVA; Residual word finding difficulty  SKIN: + S/P L Fasciotomy; LE skin changes; + Upper thigh ecchymosis at Lovenox inj site; Denies itching   All other review of systems is negative unless indicated above.    MEDICATIONS:  MEDICATIONS  (STANDING):  aspirin enteric coated 81 milliGRAM(s) Oral daily  atorvastatin 20 milliGRAM(s) Oral at bedtime  enoxaparin Injectable 100 milliGRAM(s) SubCutaneous every 12 hours  gabapentin 300 milliGRAM(s) Oral two times a day  lacosamide 150 milliGRAM(s) Oral two times a day  multivitamin 1 Tablet(s) Oral daily  pantoprazole    Tablet 40 milliGRAM(s) Oral before breakfast  sodium chloride 0.9%. 1000 milliLiter(s) (50 mL/Hr) IV Continuous <Continuous>  tamsulosin 0.4 milliGRAM(s) Oral at bedtime      PHYSICAL EXAM:  T(C): 36.4 (01-15-24 @ 12:57), Max: 36.7 (01-14-24 @ 18:05)  HR: 61 (01-15-24 @ 12:57) (61 - 68)  BP: 102/65 (01-15-24 @ 12:57) (100/62 - 131/88)  RR: 18 (01-15-24 @ 12:57) (18 - 18)  SpO2: 95% (01-15-24 @ 12:57) (95% - 97%)  Wt(kg): --  I&O's Summary    14 Jan 2024 07:01  -  15 Gerardo 2024 07:00  --------------------------------------------------------  IN: 1630 mL / OUT: 1352 mL / NET: 278 mL    15 Gerardo 2024 07:01  -  15 Gerardo 2024 14:34  --------------------------------------------------------  IN: 240 mL / OUT: 1250 mL / NET: -1010 mL        Appearance: Awake, Sitting up in bed, in no acute distress   HEENT: Eyes are open   Lymphatic: No lymphadenopathy grossly   Cardiovascular: Normal S1 S2   Respiratory: normal effort, clear  Gastrointestinal:  Soft, Non-tender  Skin: + B/L thigh ecchymosis at Lovenox injection sites  Psychiatry:  Mood & affect appropriate  Musculoskeletal: + Prior LLE Fasciotomy; Chronic skin changes        01-14-24 @ 07:01  -  01-15-24 @ 07:00  --------------------------------------------------------  IN: 1630 mL / OUT: 1352 mL / NET: 278 mL    01-15-24 @ 07:01  -  01-15-24 @ 14:34  --------------------------------------------------------  IN: 240 mL / OUT: 1250 mL / NET: -1010 mL                                  11.9   6.33  )-----------( 175      ( 15 Gerardo 2024 07:08 )             36.6               01-15    143  |  110<H>  |  16  ----------------------------<  85  3.8   |  22  |  0.98    Ca    8.3<L>      15 Gerardo 2024 07:08  Phos  3.5     01-15  Mg     2.1     01-15                         Urinalysis Basic - ( 15 Gerardo 2024 07:08 )    Color: x / Appearance: x / SG: x / pH: x  Gluc: 85 mg/dL / Ketone: x  / Bili: x / Urobili: x   Blood: x / Protein: x / Nitrite: x   Leuk Esterase: x / RBC: x / WBC x   Sq Epi: x / Non Sq Epi: x / Bacteria: x       Name of Patient : TAMI DUNHAM  MRN: [id number]  Date of visit: 01-15-24 @ 14:34      Subjective: Patient seen and examined. No new events except as noted.   Patient seen earlier this AM alongside with Hematology. Started on full dose Lovenox yesterday.   With worsening hematuria this AM. Urology follow up requested. BAN ren consulted    REVIEW OF SYSTEMS:    CONSTITUTIONAL: Generalized weakness   EYES/ENT: No visual changes;  No vertigo or throat pain   NECK: No pain or stiffness  RESPIRATORY: No cough, wheezing, hemoptysis; No shortness of breath  CARDIOVASCULAR: No chest pain or palpitations  GASTROINTESTINAL: No abdominal or epigastric pain. No nausea, vomiting, or hematemesis; No diarrhea or constipation. No melena or hematochezia.  GENITOURINARY: +Worsening Hematuria   NEUROLOGICAL: + H/O CVA; Residual word finding difficulty  SKIN: + S/P L Fasciotomy; LE skin changes; + Upper thigh ecchymosis at Lovenox inj site; Denies itching   All other review of systems is negative unless indicated above.    MEDICATIONS:  MEDICATIONS  (STANDING):  aspirin enteric coated 81 milliGRAM(s) Oral daily  atorvastatin 20 milliGRAM(s) Oral at bedtime  enoxaparin Injectable 100 milliGRAM(s) SubCutaneous every 12 hours  gabapentin 300 milliGRAM(s) Oral two times a day  lacosamide 150 milliGRAM(s) Oral two times a day  multivitamin 1 Tablet(s) Oral daily  pantoprazole    Tablet 40 milliGRAM(s) Oral before breakfast  sodium chloride 0.9%. 1000 milliLiter(s) (50 mL/Hr) IV Continuous <Continuous>  tamsulosin 0.4 milliGRAM(s) Oral at bedtime      PHYSICAL EXAM:  T(C): 36.4 (01-15-24 @ 12:57), Max: 36.7 (01-14-24 @ 18:05)  HR: 61 (01-15-24 @ 12:57) (61 - 68)  BP: 102/65 (01-15-24 @ 12:57) (100/62 - 131/88)  RR: 18 (01-15-24 @ 12:57) (18 - 18)  SpO2: 95% (01-15-24 @ 12:57) (95% - 97%)  Wt(kg): --  I&O's Summary    14 Jan 2024 07:01  -  15 Gerardo 2024 07:00  --------------------------------------------------------  IN: 1630 mL / OUT: 1352 mL / NET: 278 mL    15 Gerardo 2024 07:01  -  15 Gerardo 2024 14:34  --------------------------------------------------------  IN: 240 mL / OUT: 1250 mL / NET: -1010 mL        Appearance: Awake, Sitting up in bed, in no acute distress   HEENT: Eyes are open   Lymphatic: No lymphadenopathy grossly   Cardiovascular: Normal S1 S2   Respiratory: normal effort, clear  Gastrointestinal:  Soft, Non-tender  Skin: + B/L thigh ecchymosis at Lovenox injection sites  Psychiatry:  Mood & affect appropriate  Musculoskeletal: + Prior LLE Fasciotomy; Chronic skin changes        01-14-24 @ 07:01  -  01-15-24 @ 07:00  --------------------------------------------------------  IN: 1630 mL / OUT: 1352 mL / NET: 278 mL    01-15-24 @ 07:01  -  01-15-24 @ 14:34  --------------------------------------------------------  IN: 240 mL / OUT: 1250 mL / NET: -1010 mL                                  11.9   6.33  )-----------( 175      ( 15 Gerardo 2024 07:08 )             36.6               01-15    143  |  110<H>  |  16  ----------------------------<  85  3.8   |  22  |  0.98    Ca    8.3<L>      15 Gerardo 2024 07:08  Phos  3.5     01-15  Mg     2.1     01-15                         Urinalysis Basic - ( 15 Gerardo 2024 07:08 )    Color: x / Appearance: x / SG: x / pH: x  Gluc: 85 mg/dL / Ketone: x  / Bili: x / Urobili: x   Blood: x / Protein: x / Nitrite: x   Leuk Esterase: x / RBC: x / WBC x   Sq Epi: x / Non Sq Epi: x / Bacteria: x

## 2024-01-15 NOTE — CONSULT NOTE ADULT - SUBJECTIVE AND OBJECTIVE BOX
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 CT abd pelvis showed mild L hydro to the level of the UPJ with associated blood products in the collecting system.    Allergies: No Known Allergies    Medications (Abx/Cardiac/Anticoagulation/Blood Products)    aspirin enteric coated: 81 milliGRAM(s) Oral (01-15 @ 11:44)  enoxaparin Injectable: 100 milliGRAM(s) SubCutaneous (01-15 @ 06:01)  enoxaparin Injectable: 40 milliGRAM(s) SubCutaneous (01-14 @ 06:47)    Data:    T(C): 36.4  HR: 61  BP: 102/65  RR: 18  SpO2: 95%    LABS:                          11.9   6.33  )-----------( 175      ( 15 Gerardo 2024 07:08 )             36.6     01-15    143  |  110<H>  |  16  ----------------------------<  85  3.8   |  22  |  0.98    Ca    8.3<L>      15 Gerardo 2024 07:08  Phos  3.5     01-15  Mg     2.1     01-15          Radiology: CT reviewed    Assessment/Plan: 52 yo M with persistent hematuria and on AC for DVT    -- Initial CT showed blood products in left renal collecting system  --Discussed case with urology. Recommending CT urogram at this time. Would like to hold off on any left renal angiogram pending the CT.   --Please re-consult after urology has reviewed CT findings     Lui Antunez MD   Interventional Radiology PGY 5  Available on [employer] TEAMS    For EMERGENT inquiries/questions:  IR Pager ([facility]): [phone]  IR Pager ([facility]): [phone] ; [phone]    For non-emergent consults/questions:   Please place a sunrise order "Consult- Interventional Radiology" with an appropriate callback number    For questions about scheduling during appropriate work hours, call IR :  [facility]: [phone]  [facility]: [phone]    For outpatient IR booking:  [facility]: [phone]  [facility]: [phone] Interventional Radiology    HPI: 53y Male PMH CVA with residual word finding difficulty, 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 CT abd pelvis showed mild L hydro to the level of the UPJ with associated blood products in the collecting system.    Allergies: No Known Allergies    Medications (Abx/Cardiac/Anticoagulation/Blood Products)    aspirin enteric coated: 81 milliGRAM(s) Oral (01-15 @ 11:44)  enoxaparin Injectable: 100 milliGRAM(s) SubCutaneous (01-15 @ 06:01)  enoxaparin Injectable: 40 milliGRAM(s) SubCutaneous (01-14 @ 06:47)    Data:    T(C): 36.4  HR: 61  BP: 102/65  RR: 18  SpO2: 95%    LABS:                          11.9   6.33  )-----------( 175      ( 15 Gerardo 2024 07:08 )             36.6     01-15    143  |  110<H>  |  16  ----------------------------<  85  3.8   |  22  |  0.98    Ca    8.3<L>      15 Gerardo 2024 07:08  Phos  3.5     01-15  Mg     2.1     01-15          Radiology: CT reviewed    Assessment/Plan: 52 yo M with persistent hematuria and on AC for DVT    -- Initial CT showed blood products in left renal collecting system  --Discussed case with urology. Recommending CT urogram at this time. Would like to hold off on any left renal angiogram pending the CT.   --Please re-consult after urology has reviewed CT findings     Lui Antunez MD   Interventional Radiology PGY 5  Available on [employer] TEAMS    For EMERGENT inquiries/questions:  IR Pager ([facility]): [phone]  IR Pager ([facility]): [phone] ; [phone]    For non-emergent consults/questions:   Please place a sunrise order "Consult- Interventional Radiology" with an appropriate callback number    For questions about scheduling during appropriate work hours, call IR :  [facility]: [phone]  [facility]: [phone]    For outpatient IR booking:  [facility]: [phone]  [facility]: [phone] Interventional Radiology    HPI: 53y Male PMH CVA with residual word finding difficulty, 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 CT abd pelvis showed mild L hydro to the level of the UPJ with associated blood products in the collecting system.    Allergies: No Known Allergies    Medications (Abx/Cardiac/Anticoagulation/Blood Products)    aspirin enteric coated: 81 milliGRAM(s) Oral (01-15 @ 11:44)  enoxaparin Injectable: 100 milliGRAM(s) SubCutaneous (01-15 @ 06:01)  enoxaparin Injectable: 40 milliGRAM(s) SubCutaneous (01-14 @ 06:47)    Data:    T(C): 36.4  HR: 61  BP: 102/65  RR: 18  SpO2: 95%    LABS:                          11.9   6.33  )-----------( 175      ( 15 Gerardo 2024 07:08 )             36.6     01-15    143  |  110<H>  |  16  ----------------------------<  85  3.8   |  22  |  0.98    Ca    8.3<L>      15 Gerardo 2024 07:08  Phos  3.5     01-15  Mg     2.1     01-15          Radiology: CT reviewed    Assessment/Plan: 52 yo M with persistent hematuria and on AC for DVT    -- Initial CT showed blood products in left renal collecting system  --Discussed case with urology. Recommending CT urogram at this time. Would like to hold off on any left renal angiogram pending the CT.   --Please re-consult after urology has reviewed CT findings     Lui Antunez MD   Interventional Radiology PGY 5  Available on [employer] TEAMS    For EMERGENT inquiries/questions:  IR Pager ([facility]): [phone]  IR Pager ([facility]): [phone] ; [phone]    For non-emergent consults/questions:   Please place a sunrise order "Consult- Interventional Radiology" with an appropriate callback number    For questions about scheduling during appropriate work hours, call IR :  [facility]: [phone]  [facility]: [phone]    For outpatient IR booking:  [facility]: [phone]  [facility]: [phone]

## 2024-01-15 NOTE — PROGRESS NOTE ADULT - SUBJECTIVE AND OBJECTIVE BOX
Neurology        S: patient seen, resting in bed  + hematuria       Medications: MEDICATIONS  (STANDING):  aspirin enteric coated 81 milliGRAM(s) Oral daily  atorvastatin 20 milliGRAM(s) Oral at bedtime  enoxaparin Injectable 100 milliGRAM(s) SubCutaneous every 12 hours  gabapentin 300 milliGRAM(s) Oral two times a day  lacosamide 150 milliGRAM(s) Oral two times a day  multivitamin 1 Tablet(s) Oral daily  pantoprazole    Tablet 40 milliGRAM(s) Oral before breakfast  sodium chloride 0.9%. 1000 milliLiter(s) (50 mL/Hr) IV Continuous <Continuous>  tamsulosin 0.4 milliGRAM(s) Oral at bedtime    MEDICATIONS  (PRN):       Vitals:  Vital Signs Last 24 Hrs  T(C): 36.3 (15 Gerardo 2024 05:27), Max: 36.7 (14 Jan 2024 18:05)  T(F): 97.3 (15 Gerardo 2024 05:27), Max: 98.1 (15 Gerardo 2024 00:13)  HR: 64 (15 Gerardo 2024 05:27) (63 - 73)  BP: 114/71 (15 Gerardo 2024 05:27) (100/62 - 131/88)  BP(mean): --  RR: 18 (15 Gerardo 2024 05:27) (18 - 18)  SpO2: 97% (15 Gerardo 2024 05:27) (95% - 97%)    Parameters below as of 15 Gerardo 2024 05:27  Patient On (Oxygen Delivery Method): room air              General Exam:   General Appearance: Appropriately dressed and in no acute distress       Head: Normocephalic, atraumatic and no dysmorphic features  Ear, Nose, and Throat: Moist mucous membranes  CVS: S1S2+  Resp: No SOB, no wheeze or rhonchi  GI: soft NT/ND  Extremities: No edema or cyanosis  Skin: No bruises or rashes     Neurological Exam:  MS: Eyes open. Awake, alert, oriented to person, place, situation, time. Follows all commands. Attention/concentration, recent and remote memory, and fund of knowledge intact  Language: Speech is clear, fluent with good repetition & comprehension.  CNs: PERRL (R = 3mm, L = 3mm). VFF. EOMI No nystagmus.  No provocation on dizziness with head turning. V1-3 intact to LT b/l. No facial asymmetry b/l, full eye closure strength b/l. Hearing grossly normal (rubbing fingers) b/l. Tongue midline, normal movements, no atrophy. Palate with symmetric elevation. Trap 5/5 b/l  Motor: Normal muscle bulk & tone. No noticeable tremor. No pronator drift. No drift of UE or LE b/l.               Deltoid	Biceps	Triceps	   R	         5	      5	   5	 5	  		 	  L	         5	      5	   5	 5	  		    	H-Flex	K-Flex	K-Ext	D-Flex	P-Flex  R	    5	  5	   5	  5	    5		   L	    5	   5	   5	  4	    5		   *Some pain limitation on LLE.   Sensation: Intact to LT b/l throughout.   Cortical: Extinction on DSS (neglect): none  Reflexes:              Biceps(C5)       BR(C6)     Triceps(C7)               Patellar(L4)    Achilles(S1)    Plantar Resp  R	              3	          3	             3		 3		    3	Withdrawal       L	              3	          3	             3		 2		    1	Withdrawal    Coordination: No dysmetria to FTN/HTS b/l.   Gait: Deferred.     Data/Labs/Imaging which I personally reviewed.       LABS:                          11.9   6.33  )-----------( 175      ( 15 Gerardo 2024 07:08 )             36.6     01-15    143  |  110<H>  |  16  ----------------------------<  85  3.8   |  22  |  0.98    Ca    8.3<L>      15 Gerardo 2024 07:08  Phos  3.5     01-15  Mg     2.1     01-15          Urinalysis Basic - ( 15 Gerardo 2024 07:08 )    Color: x / Appearance: x / SG: x / pH: x  Gluc: 85 mg/dL / Ketone: x  / Bili: x / Urobili: x   Blood: x / Protein: x / Nitrite: x   Leuk Esterase: x / RBC: x / WBC x   Sq Epi: x / Non Sq Epi: x / Bacteria: x

## 2024-01-15 NOTE — CONSULT NOTE ADULT - TIME BILLING
As above, original request mentioned PAE but assuming request is for left renal angiogram for hematuria of unknown etiology on ac with CT showing hemorrhage in the left upper collecting system.  Would await further imaging to exclude occult neoplasm prior to considering angiogram.

## 2024-01-15 NOTE — CHART NOTE - NSCHARTNOTEFT_GEN_A_CORE
Pt. with hematuria with challenge of anticoagulation   CBC stable today   Urology called to evaluate appreciate reccs and awaiting urgent ct urogram  Will await final reccs per urology.  Will continue with ac as pt.is a high risk for clotting per hem-onc and medical attending   Dr. Diez is aware and in agreement with plan.   Abril Hester

## 2024-01-15 NOTE — CHART NOTE - NSCHARTNOTEFT_GEN_A_CORE
Called by primary team for urine color evaluation. Racked urine at bedside shows bright red urine color since this afternoon, dark red urine noted on last void. Patient states feels comfortable and able to void freely, denies bladder fullness or pain, denies fever, chills. Will continue rack urine, trend H&H, PVR, f/u CT urogram, call urology if retention, unable to void, or clot in urine.

## 2024-01-15 NOTE — PROGRESS NOTE ADULT - ASSESSMENT
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 Patient reports that when his hematuria began, he attributed it to increase intake of cranberry juice. Patient states that when his hematuria did not subside, he presented to his outpatient Urologist's office for which he was started on Cipro for UTI and only took one dose. Patient also endorses left flank pain. Patient denies history of nephrolithiasis or previous episodes of hematuria. Patient denies dysuria, frequency or urgency, chills, body aches, nausea, vomiting, headache, lightheadedness or dizziness.       Hematuria   - Pt with waxing and waning gross hematuria; CT w/ hemorrhage    - Check CBC BID - Monitor H/H and Hgb closely --> F/u Repeat CBC, mild drop in Hgb -    - Was resumed on full dose AC. With recurrent hematuria. Urology follow up appreciated --> Planned for CT Urogram if recurrent hematuria   - Rack urine to assess color output  - Check PVR   - Check CBC  at 17:00   - IR eval placed for possible embolization --> Awaiting CT    - Urology evaluation appreciated; F/u recs    UTI   - With associated flank pain - now resolved. UA grossly positive.    - S/P course of Rocephin. UCx --> negative   - Hydration as tolerated   - Monitor CBC, Temp Curve, VS and patient closely     Hydronephrosis   - CT w/ mild L hydronephrosis with abrupt tapering of the dilated renal collecting system at the level of the UPJ, Hyperdense material within the dilated L upper renal pole most likely representing hemorrhage, Moderate L Perinephric/ periureteric stranding, no calculi identified   - C/w Tamsulosin; Monitor I and O   - As per Urology, patient will require close outpatient follow up and work up for UPJ obstruction   - Urology eval appreciated; F/u recs    Hemorrhage   - Seen on CT as above  - Check serial CBC BID   - Duplex with possible soleal muscle hematoma ? -- Monitor H/H --> Repeat Duplex in 1 week --> ordered for 1/19   - Monitor H/H closely. Transfuse for Hgb < 7.0   - Urology eval appreciated; F/u recs    DVT  - LE Duplex w/ B/L soleal vein and right peroneal vein thrombus, Acute DVT below the knee    - Was on Lovenox for  Mg Qd. 100 BID resumed, Monitor   - Vascular Cardiology eval appreciated; F/u recs --> Duplex 1/12 with resolution of DVT   - Hematology eval appreciated; F/u recs     Seizure   - Per history   - C/w Vimpat 150 BID, off of Keppra as per Neuro.   - Seizure precautions  - Neuro eval appreciated; F/u recs    CVA  - Per History   - On ASA, Lovenox and Statin  - Neuro eval appreciated     ADHD  - Vyvanse 50 Mg not available in patient. Resume on DC  - Fall, Seizure, Aspiration precautions  - Neuro eval appreciated; F/u recs     LLE Fasciotomy   - 2/2 compartment syndrome.   - Vascular surgery eval appreciated; F/u recs --> No acute intervention at this time     HLD  - Lipid panel noted; C/w Lipitor 20    Vertigo  - Per History. 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 Patient reports that when his hematuria began, he attributed it to increase intake of cranberry juice. Patient states that when his hematuria did not subside, he presented to his outpatient Urologist's office for which he was started on Cipro for UTI and only took one dose. Patient also endorses left flank pain. Patient denies history of nephrolithiasis or previous episodes of hematuria. Patient denies dysuria, frequency or urgency, chills, body aches, nausea, vomiting, headache, lightheadedness or dizziness.       Hematuria   - Pt with waxing and waning gross hematuria; CT w/ hemorrhage    - Check CBC BID - Monitor H/H and Hgb closely --> F/u Repeat CBC, mild drop in Hgb -    - Was resumed on full dose AC. With recurrent hematuria. Urology follow up appreciated --> Planned for CT Urogram if recurrent hematuria   - Rack urine to assess color output  - Check PVR   - Check CBC  at 17:00   - IR eval placed for possible embolization --> Awaiting CT    - Urology evaluation appreciated; F/u recs    UTI   - With associated flank pain - now resolved. UA grossly positive.    - S/P course of Rocephin. UCx --> negative   - Hydration as tolerated   - Monitor CBC, Temp Curve, VS and patient closely     Hydronephrosis   - CT w/ mild L hydronephrosis with abrupt tapering of the dilated renal collecting system at the level of the UPJ, Hyperdense material within the dilated L upper renal pole most likely representing hemorrhage, Moderate L Perinephric/ periureteric stranding, no calculi identified   - C/w Tamsulosin; Monitor I and O   - As per Urology, patient will require close outpatient follow up and work up for UPJ obstruction   - Urology eval appreciated; F/u recs    Hemorrhage   - Seen on CT as above  - Check serial CBC BID   - Duplex with possible soleal muscle hematoma ? -- Monitor H/H --> Repeat Duplex in 1 week --> ordered for 1/19   - Monitor H/H closely. Transfuse for Hgb < 7.0   - Urology eval appreciated; F/u recs    DVT  - LE Duplex w/ B/L soleal vein and right peroneal vein thrombus, Acute DVT below the knee    - Was on Lovenox for  Mg Qd. 100 BID resumed, Monitor   - Vascular Cardiology eval appreciated; F/u recs --> Duplex 1/12 with resolution of DVT   - Hematology eval appreciated; F/u recs     Seizure   - Per history   - C/w Vimpat 150 BID, off of Keppra as per Neuro.   - Seizure precautions  - Neuro eval appreciated; F/u recs    CVA  - Per History   - On ASA, Lovenox and Statin  - Neuro eval appreciated     ADHD  - Vyvanse 50 Mg not available in patient. Resume on DC  - Fall, Seizure, Aspiration precautions  - Neuro eval appreciated; F/u recs     LLE Fasciotomy   - 2/2 compartment syndrome.   - Vascular surgery eval appreciated; F/u recs --> No acute intervention at this time     HLD  - Lipid panel noted; C/w Lipitor 20    Vertigo  - Per History. 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 Patient reports that when his hematuria began, he attributed it to increase intake of cranberry juice. Patient states that when his hematuria did not subside, he presented to his outpatient Urologist's office for which he was started on Cipro for UTI and only took one dose. Patient also endorses left flank pain. Patient denies history of nephrolithiasis or previous episodes of hematuria. Patient denies dysuria, frequency or urgency, chills, body aches, nausea, vomiting, headache, lightheadedness or dizziness.       Hematuria   - Pt with waxing and waning gross hematuria; CT w/ hemorrhage    - Check CBC BID - Monitor H/H and Hgb closely --> F/u Repeat CBC, mild drop in Hgb -    - Was resumed on full dose AC. With recurrent hematuria. Urology follow up appreciated --> Planned for CT Urogram if recurrent hematuria   - Rack urine to assess color output  - Check PVR   - Check CBC  at 17:00   - IR eval placed for possible embolization --> Awaiting CT    - Urology evaluation appreciated; F/u recs    UTI   - With associated flank pain - now resolved. UA grossly positive.    - S/P course of Rocephin. UCx --> negative   - Hydration as tolerated   - Monitor CBC, Temp Curve, VS and patient closely     Hydronephrosis   - CT w/ mild L hydronephrosis with abrupt tapering of the dilated renal collecting system at the level of the UPJ, Hyperdense material within the dilated L upper renal pole most likely representing hemorrhage, Moderate L Perinephric/ periureteric stranding, no calculi identified   - C/w Tamsulosin; Monitor I and O   - As per Urology, patient will require close outpatient follow up and work up for UPJ obstruction   - Urology eval appreciated; F/u recs    Hemorrhage   - Seen on CT as above  - Check serial CBC BID   - Duplex with possible soleal muscle hematoma ? -- Monitor H/H --> Repeat Duplex in 1 week --> ordered for 1/19   - Monitor H/H closely. Transfuse for Hgb < 7.0   - Urology eval appreciated; F/u recs    DVT  - LE Duplex w/ B/L soleal vein and right peroneal vein thrombus, Acute DVT below the knee    - Was on Lovenox for  Mg Qd. 100 BID resumed, Monitor   - Vascular Cardiology eval appreciated; F/u recs --> Duplex 1/12 with resolution of DVT   - Hematology eval appreciated; F/u recs     Seizure   - Per history   - C/w Vimpat 150 BID, off of Keppra as per Neuro.   - Seizure precautions  - Neuro eval appreciated; F/u recs    CVA  - Per History   - On ASA, Lovenox and Statin  - Neuro eval appreciated     ADHD  - Vyvanse 50 Mg not available in patient. Resume on DC  - Fall, Seizure, Aspiration precautions  - Neuro eval appreciated; F/u recs     LLE Fasciotomy   - 2/2 compartment syndrome.   - Vascular surgery eval appreciated; F/u recs --> No acute intervention at this time     HLD  - Lipid panel noted; C/w Lipitor 20    Vertigo  - Per History. 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 Patient reports that when his hematuria began, he attributed it to increase intake of cranberry juice. Patient states that when his hematuria did not subside, he presented to his outpatient Urologist's office for which he was started on Cipro for UTI and only took one dose. Patient also endorses left flank pain. Patient denies history of nephrolithiasis or previous episodes of hematuria. Patient denies dysuria, frequency or urgency, chills, body aches, nausea, vomiting, headache, lightheadedness or dizziness.       Hematuria   - Pt with waxing and waning gross hematuria; CT w/ hemorrhage    - Check CBC BID - Monitor H/H and Hgb closely --> F/u Repeat CBC, mild drop in Hgb -    - Was resumed on full dose AC. With recurrent hematuria. Urology follow up appreciated --> Planned for CT Urogram if recurrent hematuria   - Rack urine to assess color output  - Check PVR   - Check CBC  at 17:00   - IR eval placed for possible embolization --> Awaiting CT    - Urology evaluation appreciated; F/u recs    UTI   - With associated flank pain - now resolved. UA grossly positive.    - S/P course of Rocephin. UCx --> negative   - Hydration as tolerated   - Monitor CBC, Temp Curve, VS and patient closely     Hydronephrosis   - CT w/ mild L hydronephrosis with abrupt tapering of the dilated renal collecting system at the level of the UPJ, Hyperdense material within the dilated L upper renal pole most likely representing hemorrhage, Moderate L Perinephric/ periureteric stranding, no calculi identified   - C/w Tamsulosin; Monitor I and O   - As per Urology, patient will require close outpatient follow up and work up for UPJ obstruction   - Urology eval appreciated; F/u recs    Hemorrhage   - Seen on CT as above  - Check serial CBC BID   - Duplex with possible soleal muscle hematoma ? -- Monitor H/H --> Repeat Duplex in 1 week --> ordered for 1/19   - Monitor H/H closely. Transfuse for Hgb < 7.0   - Urology eval appreciated; F/u recs    DVT  - LE Duplex w/ B/L soleal vein and right peroneal vein thrombus, Acute DVT below the knee    - Was on Lovenox for  Mg Qd. 100 BID resumed, Monitor   - Vascular Cardiology eval appreciated; F/u recs --> Duplex 1/12 with resolution of DVT   - Hematology eval appreciated; F/u recs     Seizure   - Per history   - C/w Vimpat 150 BID, off of Keppra as per Neuro.   - Seizure precautions  - Neuro eval appreciated; F/u recs    CVA  - Per History   - On ASA, Lovenox and Statin  - Neuro eval appreciated     ADHD  - Vyvanse 50 Mg not available in patient. Resume on DC  - Fall, Seizure, Aspiration precautions  - Neuro eval appreciated; F/u recs     LLE Fasciotomy   - 2/2 compartment syndrome.   - Vascular surgery eval appreciated; F/u recs --> No acute intervention at this time     HLD  - Lipid panel noted; C/w Lipitor 20    Vertigo  - Per History. No longer on Meclizine per patient  - Fall, Seizure and Aspiration precautions   - Ambulate with Assistance  - PT eval     PPX  - Lovenox   - PPI       Discussed with Attending and Heme/Onc

## 2024-01-16 LAB
ANION GAP SERPL CALC-SCNC: 9 MMOL/L — SIGNIFICANT CHANGE UP (ref 5–17)
BUN SERPL-MCNC: 12 MG/DL — SIGNIFICANT CHANGE UP (ref 7–23)
CALCIUM SERPL-MCNC: 8.3 MG/DL — LOW (ref 8.4–10.5)
CHLORIDE SERPL-SCNC: 110 MMOL/L — HIGH (ref 96–108)
CO2 SERPL-SCNC: 22 MMOL/L — SIGNIFICANT CHANGE UP (ref 22–31)
CREAT SERPL-MCNC: 0.97 MG/DL — SIGNIFICANT CHANGE UP (ref 0.5–1.3)
EGFR: 93 ML/MIN/1.73M2 — SIGNIFICANT CHANGE UP
GLUCOSE SERPL-MCNC: 93 MG/DL — SIGNIFICANT CHANGE UP (ref 70–99)
HCT VFR BLD CALC: 35.6 % — LOW (ref 39–50)
HCT VFR BLD CALC: 38.4 % — LOW (ref 39–50)
HGB BLD-MCNC: 12 G/DL — LOW (ref 13–17)
HGB BLD-MCNC: 12.8 G/DL — LOW (ref 13–17)
MAGNESIUM SERPL-MCNC: 2.1 MG/DL — SIGNIFICANT CHANGE UP (ref 1.6–2.6)
MCHC RBC-ENTMCNC: 29.8 PG — SIGNIFICANT CHANGE UP (ref 27–34)
MCHC RBC-ENTMCNC: 29.9 PG — SIGNIFICANT CHANGE UP (ref 27–34)
MCHC RBC-ENTMCNC: 33.3 GM/DL — SIGNIFICANT CHANGE UP (ref 32–36)
MCHC RBC-ENTMCNC: 33.7 GM/DL — SIGNIFICANT CHANGE UP (ref 32–36)
MCV RBC AUTO: 88.6 FL — SIGNIFICANT CHANGE UP (ref 80–100)
MCV RBC AUTO: 89.3 FL — SIGNIFICANT CHANGE UP (ref 80–100)
NRBC # BLD: 0 /100 WBCS — SIGNIFICANT CHANGE UP (ref 0–0)
NRBC # BLD: 0 /100 WBCS — SIGNIFICANT CHANGE UP (ref 0–0)
PHOSPHATE SERPL-MCNC: 3.6 MG/DL — SIGNIFICANT CHANGE UP (ref 2.5–4.5)
PLATELET # BLD AUTO: 174 K/UL — SIGNIFICANT CHANGE UP (ref 150–400)
PLATELET # BLD AUTO: 195 K/UL — SIGNIFICANT CHANGE UP (ref 150–400)
POTASSIUM SERPL-MCNC: 3.8 MMOL/L — SIGNIFICANT CHANGE UP (ref 3.5–5.3)
POTASSIUM SERPL-SCNC: 3.8 MMOL/L — SIGNIFICANT CHANGE UP (ref 3.5–5.3)
RBC # BLD: 4.02 M/UL — LOW (ref 4.2–5.8)
RBC # BLD: 4.3 M/UL — SIGNIFICANT CHANGE UP (ref 4.2–5.8)
RBC # FLD: 12.7 % — SIGNIFICANT CHANGE UP (ref 10.3–14.5)
RBC # FLD: 12.9 % — SIGNIFICANT CHANGE UP (ref 10.3–14.5)
SODIUM SERPL-SCNC: 141 MMOL/L — SIGNIFICANT CHANGE UP (ref 135–145)
WBC # BLD: 5.24 K/UL — SIGNIFICANT CHANGE UP (ref 3.8–10.5)
WBC # BLD: 7.52 K/UL — SIGNIFICANT CHANGE UP (ref 3.8–10.5)
WBC # FLD AUTO: 5.24 K/UL — SIGNIFICANT CHANGE UP (ref 3.8–10.5)
WBC # FLD AUTO: 7.52 K/UL — SIGNIFICANT CHANGE UP (ref 3.8–10.5)

## 2024-01-16 PROCEDURE — 99231 SBSQ HOSP IP/OBS SF/LOW 25: CPT

## 2024-01-16 RX ADMIN — Medication 1 TABLET(S): at 11:45

## 2024-01-16 RX ADMIN — ENOXAPARIN SODIUM 100 MILLIGRAM(S): 100 INJECTION SUBCUTANEOUS at 17:31

## 2024-01-16 RX ADMIN — ATORVASTATIN CALCIUM 20 MILLIGRAM(S): 80 TABLET, FILM COATED ORAL at 21:19

## 2024-01-16 RX ADMIN — SODIUM CHLORIDE 50 MILLILITER(S): 9 INJECTION INTRAMUSCULAR; INTRAVENOUS; SUBCUTANEOUS at 16:49

## 2024-01-16 RX ADMIN — PANTOPRAZOLE SODIUM 40 MILLIGRAM(S): 20 TABLET, DELAYED RELEASE ORAL at 05:26

## 2024-01-16 RX ADMIN — Medication 81 MILLIGRAM(S): at 11:44

## 2024-01-16 RX ADMIN — GABAPENTIN 300 MILLIGRAM(S): 400 CAPSULE ORAL at 17:32

## 2024-01-16 RX ADMIN — GABAPENTIN 300 MILLIGRAM(S): 400 CAPSULE ORAL at 05:25

## 2024-01-16 RX ADMIN — LACOSAMIDE 150 MILLIGRAM(S): 50 TABLET ORAL at 05:25

## 2024-01-16 RX ADMIN — LACOSAMIDE 150 MILLIGRAM(S): 50 TABLET ORAL at 17:32

## 2024-01-16 RX ADMIN — ENOXAPARIN SODIUM 100 MILLIGRAM(S): 100 INJECTION SUBCUTANEOUS at 05:26

## 2024-01-16 RX ADMIN — TAMSULOSIN HYDROCHLORIDE 0.4 MILLIGRAM(S): 0.4 CAPSULE ORAL at 21:20

## 2024-01-16 NOTE — PROGRESS NOTE ADULT - ASSESSMENT
53y Male PMH CVA with residual word finding difficulty, bilateral DVTs on chronic Lovenox SQ and ASA81 (last dose yesterday), compartment syndrome status post left fasciotomy, HLD, PFO, vertigo, seizure disorder who presents to [facility] ED on 1/9/2024 for gross hematuria and intermittent suprapubic and L flank pain. CT abd pelvis showed mild L hydro to the level of the UPJ with associated blood products in the collecting system. Labs showed WBC 13.60, Hct 42.5, Cr 1.14. UA pending. PVR 55cc.    Recs:  -Please rack urine and save samples so we can continue to assess color changes  -Can continue AC for now  -trend h/h - stable  -continue hydration and monitor urine color  -F/u CT urogram official read  -If patient's hematuria does not improve, plan to recall IR for left renal angio.  -patient will ultimately need close outpatient follow up/work up for UPJ obstruction  -Discussed with Dr. Aaron Dumont [place] [facility]  [address] [place] [id number] [phone]   53y Male PMH CVA with residual word finding difficulty, bilateral DVTs on chronic Lovenox SQ and ASA81 (last dose yesterday), compartment syndrome status post left fasciotomy, HLD, PFO, vertigo, seizure disorder who presents to [facility] ED on 1/9/2024 for gross hematuria and intermittent suprapubic and L flank pain. CT abd pelvis showed mild L hydro to the level of the UPJ with associated blood products in the collecting system. Labs showed WBC 13.60, Hct 42.5, Cr 1.14. UA pending. PVR 55cc.    Recs:  -Please rack urine and save samples so we can continue to assess color changes  -Can continue AC for now  -trend h/h - stable  -continue hydration and monitor urine color  -F/u CT urogram official read  -If patient's hematuria does not improve, plan to recall IR for left renal angio.  -patient will ultimately need close outpatient follow up/work up for UPJ obstruction  -Discussed with Dr. Aaron Dumont [place] [facility]  [address] [place] [phone]

## 2024-01-16 NOTE — PROGRESS NOTE ADULT - SUBJECTIVE AND OBJECTIVE BOX
Patient is a 53y old  Male who presents with a chief complaint of Hematuria (16 Jan 2024 07:16)    Patient seen and examined.  Appears comfortable, pt reports ongoing hematuria    MEDICATIONS  (STANDING):  aspirin enteric coated 81 milliGRAM(s) Oral daily  atorvastatin 20 milliGRAM(s) Oral at bedtime  enoxaparin Injectable 100 milliGRAM(s) SubCutaneous every 12 hours  gabapentin 300 milliGRAM(s) Oral two times a day  lacosamide 150 milliGRAM(s) Oral two times a day  multivitamin 1 Tablet(s) Oral daily  pantoprazole    Tablet 40 milliGRAM(s) Oral before breakfast  sodium chloride 0.9%. 1000 milliLiter(s) (50 mL/Hr) IV Continuous <Continuous>  tamsulosin 0.4 milliGRAM(s) Oral at bedtime    MEDICATIONS  (PRN):      Vital Signs Last 24 Hrs  T(C): 36.4 (16 Jan 2024 04:43), Max: 36.7 (16 Jan 2024 00:00)  T(F): 97.6 (16 Jan 2024 04:43), Max: 98.1 (16 Jan 2024 00:00)  HR: 65 (16 Jan 2024 04:43) (61 - 75)  BP: 104/66 (16 Jan 2024 04:43) (102/65 - 129/80)  BP(mean): --  RR: 18 (16 Jan 2024 04:43) (17 - 18)  SpO2: 95% (16 Jan 2024 04:43) (95% - 97%)    Parameters below as of 16 Jan 2024 04:43  Patient On (Oxygen Delivery Method): room air        PE  Awake, alert  Anicteric, MMM  RRR  CTAB  Abd soft, NT, ND  No c/c/e  No rash grossly  FROM                          12.8   7.52  )-----------( 195      ( 16 Jan 2024 07:02 )             38.4       01-16    141  |  110<H>  |  12  ----------------------------<  93  3.8   |  22  |  0.97    Ca    8.3<L>      16 Jan 2024 07:02  Phos  3.6     01-16  Mg     2.1     01-16

## 2024-01-16 NOTE — PROGRESS NOTE ADULT - SUBJECTIVE AND OBJECTIVE BOX
Name of Patient : TAMI DUNHAM  MRN: [id number]  Date of visit: 01-16-24 @ 14:42      Subjective: Patient seen and examined. No new events except as noted.   Patient seen earlier this AM. Reports ongoing hematuria.   S/P CT A/P Urogram.     REVIEW OF SYSTEMS:    CONSTITUTIONAL: Generalized weakness   EYES/ENT: No visual changes;  No vertigo or throat pain   NECK: No pain or stiffness  RESPIRATORY: No cough, wheezing, hemoptysis; No shortness of breath  CARDIOVASCULAR: No chest pain or palpitations  GASTROINTESTINAL: No abdominal or epigastric pain. No nausea, vomiting, or hematemesis; No diarrhea or constipation. No melena or hematochezia.  GENITOURINARY: +Ongoing Hematuria   NEUROLOGICAL: + H/O CVA; Residual word finding difficulty  SKIN: + S/P L Fasciotomy; LE skin changes; + Upper thigh ecchymosis at Lovenox inj site; Denies itching   All other review of systems is negative unless indicated above.    MEDICATIONS:  MEDICATIONS  (STANDING):  aspirin enteric coated 81 milliGRAM(s) Oral daily  atorvastatin 20 milliGRAM(s) Oral at bedtime  enoxaparin Injectable 100 milliGRAM(s) SubCutaneous every 12 hours  gabapentin 300 milliGRAM(s) Oral two times a day  lacosamide 150 milliGRAM(s) Oral two times a day  multivitamin 1 Tablet(s) Oral daily  pantoprazole    Tablet 40 milliGRAM(s) Oral before breakfast  sodium chloride 0.9%. 1000 milliLiter(s) (50 mL/Hr) IV Continuous <Continuous>  tamsulosin 0.4 milliGRAM(s) Oral at bedtime      PHYSICAL EXAM:  T(C): 36.6 (01-16-24 @ 12:30), Max: 36.7 (01-16-24 @ 00:00)  HR: 66 (01-16-24 @ 12:30) (65 - 75)  BP: 99/60 (01-16-24 @ 12:30) (99/60 - 129/80)  RR: 18 (01-16-24 @ 12:30) (17 - 18)  SpO2: 86% (01-16-24 @ 12:30) (86% - 97%)  Wt(kg): --  I&O's Summary    15 Gerardo 2024 07:01  -  16 Jan 2024 07:00  --------------------------------------------------------  IN: 2030 mL / OUT: 4250 mL / NET: -2220 mL    16 Jan 2024 07:01  -  16 Jan 2024 14:42  --------------------------------------------------------  IN: 720 mL / OUT: 500 mL / NET: 220 mL      Appearance: Awake, Sitting up in bed, in no acute distress   HEENT: Eyes are open   Lymphatic: No lymphadenopathy grossly   Cardiovascular: Normal S1 S2   Respiratory: normal effort, clear  Gastrointestinal:  Soft, Non-tender  Skin: + B/L thigh ecchymosis at Lovenox injection sites  Psychiatry:  Mood & affect appropriate  Musculoskeletal: + Prior LLE Fasciotomy; Chronic skin changes      01-15-24 @ 07:01  -  01-16-24 @ 07:00  --------------------------------------------------------  IN: 2030 mL / OUT: 4250 mL / NET: -2220 mL    01-16-24 @ 07:01 - 01-16-24 @ 14:42  --------------------------------------------------------  IN: 720 mL / OUT: 500 mL / NET: 220 mL                                  12.8   7.52  )-----------( 195      ( 16 Jan 2024 07:02 )             38.4               01-16    141  |  110<H>  |  12  ----------------------------<  93  3.8   |  22  |  0.97    Ca    8.3<L>      16 Jan 2024 07:02  Phos  3.6     01-16  Mg     2.1     01-16                         Urinalysis Basic - ( 16 Jan 2024 07:02 )    Color: x / Appearance: x / SG: x / pH: x  Gluc: 93 mg/dL / Ketone: x  / Bili: x / Urobili: x   Blood: x / Protein: x / Nitrite: x   Leuk Esterase: x / RBC: x / WBC x   Sq Epi: x / Non Sq Epi: x / Bacteria: x      < from: CT Abdomen and Pelvis Urogram w/wo IV Cont (01.15.24 @ 19:43) >  IMPRESSION:  *  Mild symmetric fullness of the BILATERAL renal collecting systems to   the level of the UPJs.  *  Irregular ill-defined hyperdensity and filling defects within the LEFT   upper pole calyces, lower pole calyces, renal pelvis, suggestive of   hemorrhagic content/blood clot. High density material is overall   decreased since 1/10/2024. No definite solid enhancing soft tissue lesion   is identified in the LEFT renal collecting system or UPJ, though   evaluation for a subtle enhancing lesion on a background of dense blood   clot may be limited. Recommend continued follow-up to ensure resolution   and to exclude the possibility of underlying mass.  *  Mild diffuse urothelial thickening and enhancement of the LEFT   collecting system, which could be due to reactive inflammation or   infection. Correlate with urinalysis.  *  No discrete RIGHT-sided upper tract urothelial lesion is identified.  *  No evidence of obstructive urolithiasis.  *  Nodular reticular infiltration in the bilateral anterior thighs   subcutaneously, potentially related to recent subcutaneous injections.   Correlate with patient history.        < end of copied text >   Name of Patient : TAMI DUNHAM  MRN: [id number]  Date of visit: 01-16-24 @ 14:42      Subjective: Patient seen and examined. No new events except as noted.   Patient seen earlier this AM. Reports ongoing hematuria.   S/P CT A/P Urogram.     REVIEW OF SYSTEMS:    CONSTITUTIONAL: Generalized weakness   EYES/ENT: No visual changes;  No vertigo or throat pain   NECK: No pain or stiffness  RESPIRATORY: No cough, wheezing, hemoptysis; No shortness of breath  CARDIOVASCULAR: No chest pain or palpitations  GASTROINTESTINAL: No abdominal or epigastric pain. No nausea, vomiting, or hematemesis; No diarrhea or constipation. No melena or hematochezia.  GENITOURINARY: +Ongoing Hematuria   NEUROLOGICAL: + H/O CVA; Residual word finding difficulty  SKIN: + S/P L Fasciotomy; LE skin changes; + Upper thigh ecchymosis at Lovenox inj site; Denies itching   All other review of systems is negative unless indicated above.    MEDICATIONS:  MEDICATIONS  (STANDING):  aspirin enteric coated 81 milliGRAM(s) Oral daily  atorvastatin 20 milliGRAM(s) Oral at bedtime  enoxaparin Injectable 100 milliGRAM(s) SubCutaneous every 12 hours  gabapentin 300 milliGRAM(s) Oral two times a day  lacosamide 150 milliGRAM(s) Oral two times a day  multivitamin 1 Tablet(s) Oral daily  pantoprazole    Tablet 40 milliGRAM(s) Oral before breakfast  sodium chloride 0.9%. 1000 milliLiter(s) (50 mL/Hr) IV Continuous <Continuous>  tamsulosin 0.4 milliGRAM(s) Oral at bedtime      PHYSICAL EXAM:  T(C): 36.6 (01-16-24 @ 12:30), Max: 36.7 (01-16-24 @ 00:00)  HR: 66 (01-16-24 @ 12:30) (65 - 75)  BP: 99/60 (01-16-24 @ 12:30) (99/60 - 129/80)  RR: 18 (01-16-24 @ 12:30) (17 - 18)  SpO2: 86% (01-16-24 @ 12:30) (86% - 97%)  Wt(kg): --  I&O's Summary    15 Gerardo 2024 07:01  -  16 Jan 2024 07:00  --------------------------------------------------------  IN: 2030 mL / OUT: 4250 mL / NET: -2220 mL    16 Jan 2024 07:01  -  16 Jan 2024 14:42  --------------------------------------------------------  IN: 720 mL / OUT: 500 mL / NET: 220 mL      Appearance: Awake, Sitting up in bed, in no acute distress   HEENT: Eyes are open   Lymphatic: No lymphadenopathy grossly   Cardiovascular: Normal S1 S2   Respiratory: normal effort, clear  Gastrointestinal:  Soft, Non-tender  Skin: + B/L thigh ecchymosis at Lovenox injection sites  Psychiatry:  Mood & affect appropriate  Musculoskeletal: + Prior LLE Fasciotomy; Chronic skin changes      01-15-24 @ 07:01  -  01-16-24 @ 07:00  --------------------------------------------------------  IN: 2030 mL / OUT: 4250 mL / NET: -2220 mL    01-16-24 @ 07:01 - 01-16-24 @ 14:42  --------------------------------------------------------  IN: 720 mL / OUT: 500 mL / NET: 220 mL                                  12.8   7.52  )-----------( 195      ( 16 Jan 2024 07:02 )             38.4               01-16    141  |  110<H>  |  12  ----------------------------<  93  3.8   |  22  |  0.97    Ca    8.3<L>      16 Jan 2024 07:02  Phos  3.6     01-16  Mg     2.1     01-16                         Urinalysis Basic - ( 16 Jan 2024 07:02 )    Color: x / Appearance: x / SG: x / pH: x  Gluc: 93 mg/dL / Ketone: x  / Bili: x / Urobili: x   Blood: x / Protein: x / Nitrite: x   Leuk Esterase: x / RBC: x / WBC x   Sq Epi: x / Non Sq Epi: x / Bacteria: x      < from: CT Abdomen and Pelvis Urogram w/wo IV Cont (01.15.24 @ 19:43) >  IMPRESSION:  *  Mild symmetric fullness of the BILATERAL renal collecting systems to   the level of the UPJs.  *  Irregular ill-defined hyperdensity and filling defects within the LEFT   upper pole calyces, lower pole calyces, renal pelvis, suggestive of   hemorrhagic content/blood clot. High density material is overall   decreased since 1/10/2024. No definite solid enhancing soft tissue lesion   is identified in the LEFT renal collecting system or UPJ, though   evaluation for a subtle enhancing lesion on a background of dense blood   clot may be limited. Recommend continued follow-up to ensure resolution   and to exclude the possibility of underlying mass.  *  Mild diffuse urothelial thickening and enhancement of the LEFT   collecting system, which could be due to reactive inflammation or   infection. Correlate with urinalysis.  *  No discrete RIGHT-sided upper tract urothelial lesion is identified.  *  No evidence of obstructive urolithiasis.  *  Nodular reticular infiltration in the bilateral anterior thighs   subcutaneously, potentially related to recent subcutaneous injections.   Correlate with patient history.        < end of copied text >

## 2024-01-16 NOTE — PROGRESS NOTE ADULT - ASSESSMENT
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 Patient reports that when his hematuria began, he attributed it to increase intake of cranberry juice. Patient states that when his hematuria did not subside, he presented to his outpatient Urologist's office for which he was started on Cipro for UTI and only took one dose. Patient also endorses left flank pain. Patient denies history of nephrolithiasis or previous episodes of hematuria. Patient denies dysuria, frequency or urgency, chills, body aches, nausea, vomiting, headache, lightheadedness or dizziness.       Hematuria   - Pt with waxing and waning gross hematuria; CT w/ hemorrhage    - Check CBC BID - Monitor H/H and Hgb closely --> F/u Repeat CBC, mild drop in Hgb -    - Was resumed on full dose AC. With recurrent hematuria. Urology follow up appreciated   - CT Urogram as noted; Urology follow up requested; F/u recs   - Rack urine to assess color output  - Check PVR   - Check CBC at 17:00   - IR eval for possible embolization appreciated; F/u recs   - Urology evaluation appreciated; F/u recs    UTI   - With associated flank pain - now resolved. UA grossly positive.    - S/P course of Rocephin. UCx --> negative   - Hydration as tolerated   - Monitor CBC, Temp Curve, VS and patient closely     Hydronephrosis   - CT w/ mild L hydronephrosis with abrupt tapering of the dilated renal collecting system at the level of the UPJ, Hyperdense material within the dilated L upper renal pole most likely representing hemorrhage, Moderate L Perinephric/ periureteric stranding, no calculi identified   - C/w Tamsulosin; Monitor I and O   - As per Urology, patient will require close outpatient follow up and work up for UPJ obstruction   - Urology eval appreciated; F/u recs    Hemorrhage   - Seen on CT as above  - Check serial CBC BID   - Duplex with possible soleal muscle hematoma ? -- Monitor H/H --> Repeat Duplex in 1 week --> ordered for 1/19   - Monitor H/H closely. Transfuse for Hgb < 7.0   - Urology eval appreciated; F/u recs    DVT  - LE Duplex w/ B/L soleal vein and right peroneal vein thrombus, Acute DVT below the knee    - Was on Lovenox for  Mg Qd. 100 BID resumed, Monitor   - Vascular Cardiology eval appreciated; F/u recs --> Duplex 1/12 with resolution of DVT   - Hematology eval appreciated; F/u recs     Seizure   - Per history   - C/w Vimpat 150 BID, off of Keppra as per Neuro.   - Seizure precautions  - Neuro eval appreciated; F/u recs    CVA  - Per History   - On ASA, Lovenox and Statin  - Neuro eval appreciated     ADHD  - Vyvanse 50 Mg not available in patient. Resume on DC  - Fall, Seizure, Aspiration precautions  - Neuro eval appreciated; F/u recs     LLE Fasciotomy   - 2/2 compartment syndrome.   - Vascular surgery eval appreciated; F/u recs --> No acute intervention at this time     HLD  - Lipid panel noted; C/w Lipitor 20    Vertigo  - Per History. No longer on Meclizine per patient  - Fall, Seizure and Aspiration precautions   - Ambulate with Assistance  - PT eval     PPX  - Lovenox   - PPI       Discussed with Attending and ACP.  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 Patient reports that when his hematuria began, he attributed it to increase intake of cranberry juice. Patient states that when his hematuria did not subside, he presented to his outpatient Urologist's office for which he was started on Cipro for UTI and only took one dose. Patient also endorses left flank pain. Patient denies history of nephrolithiasis or previous episodes of hematuria. Patient denies dysuria, frequency or urgency, chills, body aches, nausea, vomiting, headache, lightheadedness or dizziness.       Hematuria   - Pt with waxing and waning gross hematuria; CT w/ hemorrhage    - Check CBC BID - Monitor H/H and Hgb closely --> F/u Repeat CBC, mild drop in Hgb -    - Was resumed on full dose AC. With recurrent hematuria. Urology follow up appreciated   - CT Urogram as noted; Urology follow up requested; F/u recs   - Rack urine to assess color output  - Check PVR   - Check CBC at 17:00   - IR eval for possible embolization appreciated; F/u recs   - Urology evaluation appreciated; F/u recs    UTI   - With associated flank pain - now resolved. UA grossly positive.    - S/P course of Rocephin. UCx --> negative   - Hydration as tolerated   - Monitor CBC, Temp Curve, VS and patient closely     Hydronephrosis   - CT w/ mild L hydronephrosis with abrupt tapering of the dilated renal collecting system at the level of the UPJ, Hyperdense material within the dilated L upper renal pole most likely representing hemorrhage, Moderate L Perinephric/ periureteric stranding, no calculi identified   - C/w Tamsulosin; Monitor I and O   - As per Urology, patient will require close outpatient follow up and work up for UPJ obstruction   - Urology eval appreciated; F/u recs    Hemorrhage   - Seen on CT as above  - Check serial CBC BID   - Duplex with possible soleal muscle hematoma ? -- Monitor H/H --> Repeat Duplex in 1 week --> ordered for 1/19   - Monitor H/H closely. Transfuse for Hgb < 7.0   - Urology eval appreciated; F/u recs    DVT  - LE Duplex w/ B/L soleal vein and right peroneal vein thrombus, Acute DVT below the knee    - Was on Lovenox for  Mg Qd. 100 BID resumed, Monitor   - Vascular Cardiology eval appreciated; F/u recs --> Duplex 1/12 with resolution of DVT   - Hematology eval appreciated; F/u recs     Seizure   - Per history   - C/w Vimpat 150 BID, off of Keppra as per Neuro.   - Seizure precautions  - Neuro eval appreciated; F/u recs    CVA  - Per History   - On ASA, Lovenox and Statin  - Neuro eval appreciated     ADHD  - Vyvanse 50 Mg not available in patient. Resume on DC  - Fall, Seizure, Aspiration precautions  - Neuro eval appreciated; F/u recs     LLE Fasciotomy   - 2/2 compartment syndrome.   - Vascular surgery eval appreciated; F/u recs --> No acute intervention at this time     HLD  - Lipid panel noted; C/w Lipitor 20    Vertigo  - Per History. No longer on Meclizine per patient  - Fall, Seizure and Aspiration precautions   - Ambulate with Assistance  - PT eval     PPX  - Lovenox   - PPI       Discussed with Attending and ACP.  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 Patient reports that when his hematuria began, he attributed it to increase intake of cranberry juice. Patient states that when his hematuria did not subside, he presented to his outpatient Urologist's office for which he was started on Cipro for UTI and only took one dose. Patient also endorses left flank pain. Patient denies history of nephrolithiasis or previous episodes of hematuria. Patient denies dysuria, frequency or urgency, chills, body aches, nausea, vomiting, headache, lightheadedness or dizziness.       Hematuria   - Pt with waxing and waning gross hematuria; CT w/ hemorrhage    - Check CBC BID - Monitor H/H and Hgb closely --> F/u Repeat CBC, mild drop in Hgb -    - Was resumed on full dose AC. With recurrent hematuria. Urology follow up appreciated   - CT Urogram as noted; Urology follow up requested; F/u recs   - Rack urine to assess color output  - Check PVR   - Check CBC at 17:00   - IR eval for possible embolization appreciated; F/u recs --> As per discussion with IR, patient made NPO. IR eval placed for possible embolization in view of persistent hematuria   - Urology evaluation appreciated; F/u recs    UTI   - With associated flank pain - now resolved. UA grossly positive.    - S/P course of Rocephin. UCx --> negative   - Hydration as tolerated   - Monitor CBC, Temp Curve, VS and patient closely     Hydronephrosis   - CT w/ mild L hydronephrosis with abrupt tapering of the dilated renal collecting system at the level of the UPJ, Hyperdense material within the dilated L upper renal pole most likely representing hemorrhage, Moderate L Perinephric/ periureteric stranding, no calculi identified   - C/w Tamsulosin; Monitor I and O   - As per Urology, patient will require close outpatient follow up and work up for UPJ obstruction   - Urology eval appreciated; F/u recs    Hemorrhage   - Seen on CT as above  - Check serial CBC BID   - Duplex with possible soleal muscle hematoma ? -- Monitor H/H --> Repeat Duplex in 1 week --> ordered for 1/19   - Monitor H/H closely. Transfuse for Hgb < 7.0   - Urology eval appreciated; F/u recs    DVT  - LE Duplex w/ B/L soleal vein and right peroneal vein thrombus, Acute DVT below the knee    - Was on Lovenox for  Mg Qd. 100 BID resumed, Monitor   - Vascular Cardiology eval appreciated; F/u recs --> Duplex 1/12 with resolution of DVT   - Hematology eval appreciated; F/u recs     Seizure   - Per history   - C/w Vimpat 150 BID, off of Keppra as per Neuro.   - Seizure precautions  - Neuro eval appreciated; F/u recs    CVA  - Per History   - On ASA, Lovenox and Statin  - Neuro eval appreciated     ADHD  - Vyvanse 50 Mg not available in patient. Resume on DC  - Fall, Seizure, Aspiration precautions  - Neuro eval appreciated; F/u recs     LLE Fasciotomy   - 2/2 compartment syndrome.   - Vascular surgery eval appreciated; F/u recs --> No acute intervention at this time     HLD  - Lipid panel noted; C/w Lipitor 20    Vertigo  - Per History. No longer on Meclizine per patient  - Fall, Seizure and Aspiration precautions   - Ambulate with Assistance  - PT eval     PPX  - Lovenox   - PPI       Discussed with Attending and ACP.  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 with a chief complaint of 4-5 days of gross hematuria of dark red color with occasional passage of clots associated with suprapubic and left sided flank pain. Patient reports that when his hematuria began, he attributed it to increase intake of cranberry juice. Patient states that when his hematuria did not subside, he presented to his outpatient Urologist's office for which he was started on Cipro for UTI and only took one dose. Patient also endorses left flank pain. Patient denies history of nephrolithiasis or previous episodes of hematuria. Patient denies dysuria, frequency or urgency, chills, body aches, nausea, vomiting, headache, lightheadedness or dizziness.       Hematuria   - Pt with waxing and waning gross hematuria; CT w/ hemorrhage    - Check CBC BID - Monitor H/H and Hgb closely --> F/u Repeat CBC, mild drop in Hgb -    - Was resumed on full dose AC. With recurrent hematuria. Urology follow up appreciated   - CT Urogram as noted; Urology follow up requested; F/u recs   - Rack urine to assess color output  - Check PVR   - Check CBC at 17:00   - IR eval for possible embolization appreciated; F/u recs --> As per discussion with IR, patient made NPO. IR eval placed for possible embolization in view of persistent hematuria   - Urology evaluation appreciated; F/u recs    UTI   - With associated flank pain - now resolved. UA grossly positive.    - S/P course of Rocephin. UCx --> negative   - Hydration as tolerated   - Monitor CBC, Temp Curve, VS and patient closely     Hydronephrosis   - CT w/ mild L hydronephrosis with abrupt tapering of the dilated renal collecting system at the level of the UPJ, Hyperdense material within the dilated L upper renal pole most likely representing hemorrhage, Moderate L Perinephric/ periureteric stranding, no calculi identified   - C/w Tamsulosin; Monitor I and O   - As per Urology, patient will require close outpatient follow up and work up for UPJ obstruction   - Urology eval appreciated; F/u recs    Hemorrhage   - Seen on CT as above  - Check serial CBC BID   - Duplex with possible soleal muscle hematoma ? -- Monitor H/H --> Repeat Duplex in 1 week --> ordered for 1/19   - Monitor H/H closely. Transfuse for Hgb < 7.0   - Urology eval appreciated; F/u recs    DVT  - LE Duplex w/ B/L soleal vein and right peroneal vein thrombus, Acute DVT below the knee    - Was on Lovenox for  Mg Qd. 100 BID resumed, Monitor   - Vascular Cardiology eval appreciated; F/u recs --> Duplex 1/12 with resolution of DVT   - Hematology eval appreciated; F/u recs     Seizure   - Per history   - C/w Vimpat 150 BID, off of Keppra as per Neuro.   - Seizure precautions  - Neuro eval appreciated; F/u recs    CVA  - Per History   - On ASA, Lovenox and Statin  - Neuro eval appreciated     ADHD  - Vyvanse 50 Mg not available in patient. Resume on DC  - Fall, Seizure, Aspiration precautions  - Neuro eval appreciated; F/u recs     LLE Fasciotomy   - 2/2 compartment syndrome.   - Vascular surgery eval appreciated; F/u recs --> No acute intervention at this time     HLD  - Lipid panel noted; C/w Lipitor 20    Vertigo  - Per History. No longer on Meclizine per patient  - Fall, Seizure and Aspiration precautions   - Ambulate with Assistance  - PT eval     PPX  - Lovenox   - PPI       Discussed with Attending and ACP.

## 2024-01-16 NOTE — PROGRESS NOTE ADULT - SUBJECTIVE AND OBJECTIVE BOX
Neurology        S: patient seen, resting in bed  + hematuria           Medications: MEDICATIONS  (STANDING):  aspirin enteric coated 81 milliGRAM(s) Oral daily  atorvastatin 20 milliGRAM(s) Oral at bedtime  enoxaparin Injectable 100 milliGRAM(s) SubCutaneous every 12 hours  gabapentin 300 milliGRAM(s) Oral two times a day  lacosamide 150 milliGRAM(s) Oral two times a day  multivitamin 1 Tablet(s) Oral daily  pantoprazole    Tablet 40 milliGRAM(s) Oral before breakfast  sodium chloride 0.9%. 1000 milliLiter(s) (50 mL/Hr) IV Continuous <Continuous>  tamsulosin 0.4 milliGRAM(s) Oral at bedtime    MEDICATIONS  (PRN):       Vitals:  Vital Signs Last 24 Hrs  T(C): 36.3 (16 Jan 2024 21:25), Max: 36.7 (16 Jan 2024 00:00)  T(F): 97.3 (16 Jan 2024 21:25), Max: 98.1 (16 Jan 2024 00:00)  HR: 64 (16 Jan 2024 21:25) (63 - 66)  BP: 114/79 (16 Jan 2024 21:25) (99/60 - 117/75)  BP(mean): --  RR: 18 (16 Jan 2024 21:25) (18 - 18)  SpO2: 98% (16 Jan 2024 21:25) (86% - 98%)    Parameters below as of 16 Jan 2024 21:25  Patient On (Oxygen Delivery Method): room air                  General Exam:   General Appearance: Appropriately dressed and in no acute distress       Head: Normocephalic, atraumatic and no dysmorphic features  Ear, Nose, and Throat: Moist mucous membranes  CVS: S1S2+  Resp: No SOB, no wheeze or rhonchi  GI: soft NT/ND  Extremities: No edema or cyanosis  Skin: No bruises or rashes     Neurological Exam:  MS: Eyes open. Awake, alert, oriented to person, place, situation, time. Follows all commands. Attention/concentration, recent and remote memory, and fund of knowledge intact  Language: Speech is clear, fluent with good repetition & comprehension.  CNs: PERRL (R = 3mm, L = 3mm). VFF. EOMI No nystagmus.  No provocation on dizziness with head turning. V1-3 intact to LT b/l. No facial asymmetry b/l, full eye closure strength b/l. Hearing grossly normal (rubbing fingers) b/l. Tongue midline, normal movements, no atrophy. Palate with symmetric elevation. Trap 5/5 b/l  Motor: Normal muscle bulk & tone. No noticeable tremor. No pronator drift. No drift of UE or LE b/l.               Deltoid	Biceps	Triceps	   R	         5	      5	   5	 5	  		 	  L	         5	      5	   5	 5	  		    	H-Flex	K-Flex	K-Ext	D-Flex	P-Flex  R	    5	  5	   5	  5	    5		   L	    5	   5	   5	  4	    5		   *Some pain limitation on LLE.   Sensation: Intact to LT b/l throughout.   Cortical: Extinction on DSS (neglect): none  Reflexes:              Biceps(C5)       BR(C6)     Triceps(C7)               Patellar(L4)    Achilles(S1)    Plantar Resp  R	              3	          3	             3		 3		    3	Withdrawal       L	              3	          3	             3		 2		    1	Withdrawal    Coordination: No dysmetria to FTN/HTS b/l.   Gait: Deferred.     Data/Labs/Imaging which I personally reviewed.         LABS:                          12.0   5.24  )-----------( 174      ( 16 Jan 2024 17:14 )             35.6     01-16    141  |  110<H>  |  12  ----------------------------<  93  3.8   |  22  |  0.97    Ca    8.3<L>      16 Jan 2024 07:02  Phos  3.6     01-16  Mg     2.1     01-16          Urinalysis Basic - ( 16 Jan 2024 07:02 )    Color: x / Appearance: x / SG: x / pH: x  Gluc: 93 mg/dL / Ketone: x  / Bili: x / Urobili: x   Blood: x / Protein: x / Nitrite: x   Leuk Esterase: x / RBC: x / WBC x   Sq Epi: x / Non Sq Epi: x / Bacteria: x

## 2024-01-16 NOTE — CHART NOTE - NSCHARTNOTEFT_GEN_A_CORE
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 Patient with recurrent hematuria in setting of anticoagulation. IR reconsulted for evaluation of left renal angio/embo.     < from: CT Abdomen and Pelvis Urogram w/wo IV Cont (01.15.24 @ 19:43) >    IMPRESSION:  *  Mild symmetric fullness of the BILATERAL renal collecting systems to   the level of the UPJs.  *  Irregular ill-defined hyperdensity and filling defects within the LEFT   upper pole calyces, lower pole calyces, renal pelvis, suggestive of   hemorrhagic content/blood clot. High density material is overall   decreased since 1/10/2024. No definite solid enhancing soft tissue lesion   is identified in the LEFT renal collecting system or UPJ, though   evaluation for a subtle enhancing lesion on a background of dense blood   clot may be limited. Recommend continued follow-up to ensure resolution   and to exclude the possibility of underlying mass.  *  Mild diffuse urothelial thickening and enhancement of the LEFT   collecting system, which could be due to reactive inflammation or   infection. Correlate with urinalysis.  *  No discrete RIGHT-sided upper tract urothelial lesion is identified.  *  No evidence of obstructive urolithiasis.  *  Nodular reticular infiltration in the bilateral anterior thighs   subcutaneously, potentially related to recent subcutaneous injections.   Correlate with patient history.    < end of copied text >      -CT urogram obtained unable to definitively exclude underlying mass given blood within the collecting system.   -Patient is currently hemodynamically stable, h/h stable no transfusion requirements. Recommend d/c anticoagulation and observing. Repeat CT urogram once hematuria clears for better assessment of ?underlying malignancy.   -Case discussed with urology, per discussion urology to communicate with hem/ onc regarding need for a/c given acute DVT below the knee and now resolved as noted on LE duplex 1/12.   -Case and imaging reviewed with Dr Caceres and Dr. Garcia.   -Please reconsult as needed/ patient clinical status change.   - d/w primary team

## 2024-01-16 NOTE — PROGRESS NOTE ADULT - ASSESSMENT
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  Saw his urologist  was started on an antibioti. Patient also reports several episodes of severe intermittent left flank pain today, denies history of kidney stones to his knowledge.  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  f/u  . f/u H/h    -  AC when restarted ; lovenox restarted but now BID   - for ADHD gets Vyvanse 50mg daily  - for seizure he is on vimpat should now be 150mg BID  and was taken off keppra   - f/u heme/onc, new dvt was on lovenox ; now changed to BID dosing   - for secondary stroke prevention he is on asa 81mg daily and full dose lovenox .  - statin therapy with LDL goal < 70mg/dL; atorvastatin 20 at home   - f/u  for flank pain, hematuria and obstruction   - abx for UTI   - PT/OT   - meclizion prn for vertigo   - check FS, glucose control <180  - GI/DVT    - Thank you for allowing me to participate in the care of this patient. Call with questions.   known to me from office   Braxton Forde MD  Vascular Neurology  Office: [phone]

## 2024-01-16 NOTE — PROGRESS NOTE ADULT - ASSESSMENT
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    hx DVT  --patient w/ known history of R LE thrombosis and echogenic linear thrombus in L popliteal vain on b/l LE doppler performed 5/3/23. follow up study done 8/28/23 w/ noted chronic post thrombotic changes but no evidence of DVT  --LE duplex done inpatient on 1/10 w/ Thrombus in the bilateral soleal veins and right peroneal vein.  --outpatient hypercoagulable was negative  --patient previously treated w/ Eliquis but transitioned to Lovenox d/t therapy failure   --will recommend transitioning from Lovenox QD to BID at 1mg/kg  --AC currently on hold d/t hematuria  --vascular surgery consulted, no surgical intervention recommended at this time    hematuria/UTI  --CT a/p w/  Mild left hydronephrosis with abrupt tapering at the ureteropelvic junction. There is evidence of hemorrhage into the dilated left upper   renal pole collecting system and associated inflammatory changes throughout the perinephric fat. 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 ED on 1/9/2024 for gross hematuria and intermittent suprapubic and L flank pain.    hx DVT  --patient w/ known history of R LE thrombosis and echogenic linear thrombus in L popliteal vain on b/l LE doppler performed 5/3/23. follow up study done 8/28/23 w/ noted chronic post thrombotic changes but no evidence of DVT  --LE duplex done inpatient on 1/10 w/ Thrombus in the bilateral soleal veins and right peroneal vein.  --outpatient hypercoagulable was negative  --patient previously treated w/ Eliquis but transitioned to Lovenox d/t therapy failure   --will recommend transitioning from Lovenox QD to BID at 1mg/kg  --AC currently on hold d/t hematuria  --vascular surgery consulted, no surgical intervention recommended at this time    hematuria/UTI  --CT a/p w/  Mild left hydronephrosis with abrupt tapering at the ureteropelvic junction. There is evidence of hemorrhage into the dilated left upper   renal pole collecting system and associated inflammatory changes throughout the perinephric fat. No radiodense calculi identifie  --on ceftriaxone for UTI/ hydronephrosis   --renal following  --IR consulted for possible embolization  --s/p CT-urogram, read pending     will follow,    Luzmaria Xiong NP  Hematology/ Oncology  [facility]  [phone] (office)  [phone] (alt office)  Evenings and weekends please call MD on call or office

## 2024-01-16 NOTE — PROGRESS NOTE ADULT - SUBJECTIVE AND OBJECTIVE BOX
Subjective  Patient seen and examined. Resting comfortably.    Objective    Vital signs  T(F): , Max: 98.1 (01-16-24 @ 00:00)  HR: 65 (01-16-24 @ 04:43)  BP: 104/66 (01-16-24 @ 04:43)  SpO2: 95% (01-16-24 @ 04:43)  Wt(kg): --    Output     OUT:    Voided (mL): 4250 mL  Total OUT: 4250 mL    Total NET: -4250 mL    Gen: NAD  Abd: soft, nontender, nondistended  : Voiding dark red urine        Labs      01-15 @ 17:48    WBC 5.41  / Hct 37.4  / SCr --       01-15 @ 07:08    WBC 6.33  / Hct 36.6  / SCr 0.98         Culture - Urine (collected 01-10-24 @ 07:39)  Source: Clean Catch Clean Catch (Midstream)  Final Report (01-11-24 @ 14:08):    <10,000 CFU/mL Normal Urogenital Narda    Culture - Urine (collected 01-10-24 @ 00:41)  Source: Clean Catch Clean Catch (Midstream)  Final Report (01-11-24 @ 08:38):    No growth    Imaging  CT urogram done on 1/15. Pending final read

## 2024-01-17 LAB
ANION GAP SERPL CALC-SCNC: 10 MMOL/L — SIGNIFICANT CHANGE UP (ref 5–17)
BASOPHILS # BLD AUTO: 0.05 K/UL — SIGNIFICANT CHANGE UP (ref 0–0.2)
BASOPHILS NFR BLD AUTO: 0.8 % — SIGNIFICANT CHANGE UP (ref 0–2)
BUN SERPL-MCNC: 12 MG/DL — SIGNIFICANT CHANGE UP (ref 7–23)
CALCIUM SERPL-MCNC: 8.2 MG/DL — LOW (ref 8.4–10.5)
CHLORIDE SERPL-SCNC: 107 MMOL/L — SIGNIFICANT CHANGE UP (ref 96–108)
CO2 SERPL-SCNC: 22 MMOL/L — SIGNIFICANT CHANGE UP (ref 22–31)
CREAT SERPL-MCNC: 0.93 MG/DL — SIGNIFICANT CHANGE UP (ref 0.5–1.3)
EGFR: 98 ML/MIN/1.73M2 — SIGNIFICANT CHANGE UP
EOSINOPHIL # BLD AUTO: 0.43 K/UL — SIGNIFICANT CHANGE UP (ref 0–0.5)
EOSINOPHIL NFR BLD AUTO: 6.9 % — HIGH (ref 0–6)
GLUCOSE SERPL-MCNC: 89 MG/DL — SIGNIFICANT CHANGE UP (ref 70–99)
HCT VFR BLD CALC: 33.6 % — LOW (ref 39–50)
HCT VFR BLD CALC: 34.1 % — LOW (ref 39–50)
HGB BLD-MCNC: 11 G/DL — LOW (ref 13–17)
HGB BLD-MCNC: 11.5 G/DL — LOW (ref 13–17)
IMM GRANULOCYTES NFR BLD AUTO: 0.8 % — SIGNIFICANT CHANGE UP (ref 0–0.9)
LYMPHOCYTES # BLD AUTO: 1.52 K/UL — SIGNIFICANT CHANGE UP (ref 1–3.3)
LYMPHOCYTES # BLD AUTO: 24.2 % — SIGNIFICANT CHANGE UP (ref 13–44)
MAGNESIUM SERPL-MCNC: 2.1 MG/DL — SIGNIFICANT CHANGE UP (ref 1.6–2.6)
MCHC RBC-ENTMCNC: 29.4 PG — SIGNIFICANT CHANGE UP (ref 27–34)
MCHC RBC-ENTMCNC: 30.2 PG — SIGNIFICANT CHANGE UP (ref 27–34)
MCHC RBC-ENTMCNC: 32.7 GM/DL — SIGNIFICANT CHANGE UP (ref 32–36)
MCHC RBC-ENTMCNC: 33.7 GM/DL — SIGNIFICANT CHANGE UP (ref 32–36)
MCV RBC AUTO: 89.5 FL — SIGNIFICANT CHANGE UP (ref 80–100)
MCV RBC AUTO: 89.8 FL — SIGNIFICANT CHANGE UP (ref 80–100)
MONOCYTES # BLD AUTO: 0.52 K/UL — SIGNIFICANT CHANGE UP (ref 0–0.9)
MONOCYTES NFR BLD AUTO: 8.3 % — SIGNIFICANT CHANGE UP (ref 2–14)
NEUTROPHILS # BLD AUTO: 3.7 K/UL — SIGNIFICANT CHANGE UP (ref 1.8–7.4)
NEUTROPHILS NFR BLD AUTO: 59 % — SIGNIFICANT CHANGE UP (ref 43–77)
NRBC # BLD: 0 /100 WBCS — SIGNIFICANT CHANGE UP (ref 0–0)
NRBC # BLD: 0 /100 WBCS — SIGNIFICANT CHANGE UP (ref 0–0)
PHOSPHATE SERPL-MCNC: 3.4 MG/DL — SIGNIFICANT CHANGE UP (ref 2.5–4.5)
PLATELET # BLD AUTO: 180 K/UL — SIGNIFICANT CHANGE UP (ref 150–400)
PLATELET # BLD AUTO: 181 K/UL — SIGNIFICANT CHANGE UP (ref 150–400)
POTASSIUM SERPL-MCNC: 3.6 MMOL/L — SIGNIFICANT CHANGE UP (ref 3.5–5.3)
POTASSIUM SERPL-SCNC: 3.6 MMOL/L — SIGNIFICANT CHANGE UP (ref 3.5–5.3)
RBC # BLD: 3.74 M/UL — LOW (ref 4.2–5.8)
RBC # BLD: 3.81 M/UL — LOW (ref 4.2–5.8)
RBC # FLD: 13 % — SIGNIFICANT CHANGE UP (ref 10.3–14.5)
RBC # FLD: 13 % — SIGNIFICANT CHANGE UP (ref 10.3–14.5)
SODIUM SERPL-SCNC: 139 MMOL/L — SIGNIFICANT CHANGE UP (ref 135–145)
WBC # BLD: 5.82 K/UL — SIGNIFICANT CHANGE UP (ref 3.8–10.5)
WBC # BLD: 6.27 K/UL — SIGNIFICANT CHANGE UP (ref 3.8–10.5)
WBC # FLD AUTO: 5.82 K/UL — SIGNIFICANT CHANGE UP (ref 3.8–10.5)
WBC # FLD AUTO: 6.27 K/UL — SIGNIFICANT CHANGE UP (ref 3.8–10.5)

## 2024-01-17 PROCEDURE — 99231 SBSQ HOSP IP/OBS SF/LOW 25: CPT

## 2024-01-17 RX ADMIN — LACOSAMIDE 150 MILLIGRAM(S): 50 TABLET ORAL at 18:19

## 2024-01-17 RX ADMIN — ATORVASTATIN CALCIUM 20 MILLIGRAM(S): 80 TABLET, FILM COATED ORAL at 22:37

## 2024-01-17 RX ADMIN — TAMSULOSIN HYDROCHLORIDE 0.4 MILLIGRAM(S): 0.4 CAPSULE ORAL at 22:38

## 2024-01-17 RX ADMIN — PANTOPRAZOLE SODIUM 40 MILLIGRAM(S): 20 TABLET, DELAYED RELEASE ORAL at 06:11

## 2024-01-17 RX ADMIN — Medication 1 TABLET(S): at 11:12

## 2024-01-17 RX ADMIN — GABAPENTIN 300 MILLIGRAM(S): 400 CAPSULE ORAL at 06:11

## 2024-01-17 RX ADMIN — Medication 81 MILLIGRAM(S): at 11:12

## 2024-01-17 RX ADMIN — GABAPENTIN 300 MILLIGRAM(S): 400 CAPSULE ORAL at 17:26

## 2024-01-17 RX ADMIN — ENOXAPARIN SODIUM 100 MILLIGRAM(S): 100 INJECTION SUBCUTANEOUS at 06:11

## 2024-01-17 RX ADMIN — ENOXAPARIN SODIUM 100 MILLIGRAM(S): 100 INJECTION SUBCUTANEOUS at 17:26

## 2024-01-17 RX ADMIN — LACOSAMIDE 150 MILLIGRAM(S): 50 TABLET ORAL at 06:10

## 2024-01-17 NOTE — PROGRESS NOTE ADULT - ASSESSMENT
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 CT abd pelvis showed mild L hydro to the level of the UPJ with associated blood products in the collecting system. Labs showed WBC 13.60, Hct 42.5, Cr 1.14. UA pending. PVR 55cc.    Recs:  -Please rack urine and save samples so we can continue to assess color changes  -Need discussion with heme-onc to see need for continuing full AC at this time  -After discussion with IR need a repeat CT urogram once hematuria clears to r/o underlying malignancy  -Hope to hold AC and see if urine color clears. F/u IR recs  -trend h/h - stable  -continue hydration and monitor urine color  -patient will ultimately need close outpatient follow up/work up for UPJ obstruction  -Discussed with Dr. Aaron Dumont [place] [facility]  [address] [place] [id number] [phone]

## 2024-01-17 NOTE — PROGRESS NOTE ADULT - ASSESSMENT
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 Patient reports that when his hematuria began, he attributed it to increase intake of cranberry juice. Patient states that when his hematuria did not subside, he presented to his outpatient Urologist's office for which he was started on Cipro for UTI and only took one dose. Patient also endorses left flank pain. Patient denies history of nephrolithiasis or previous episodes of hematuria. Patient denies dysuria, frequency or urgency, chills, body aches, nausea, vomiting, headache, lightheadedness or dizziness.       Hematuria   - Pt with waxing and waning gross hematuria; CT w/ hemorrhage    - Check CBC BID - Monitor H/H and Hgb closely --> F/u Repeat CBC, mild drop in Hgb -    - Was resumed on full dose AC. With recurrent hematuria. Urology follow up appreciated   - CT Urogram as noted, ? neoplasm; Urology and IR follow up appreciated --> Plan for repeat CT urogram once hematuria resolves to R/O underlying neoplasm. As per discussion with Heme/Onc 1/17, patient to continue AC at this time   - Rack urine to assess color output  - Check PVR   - Mild down-trend in Hgb. Check CBC at 17:00   - IR eval for possible embolization appreciated; F/u recs --> pending repeat CT once hematuria resolves   - Urology evaluation appreciated; F/u recs    UTI   - With associated flank pain - now resolved. UA grossly positive.    - S/P course of Rocephin. UCx --> negative   - Hydration as tolerated   - Monitor CBC, Temp Curve, VS and patient closely     Hydronephrosis   - CT w/ mild L hydronephrosis with abrupt tapering of the dilated renal collecting system at the level of the UPJ, Hyperdense material within the dilated L upper renal pole most likely representing hemorrhage, Moderate L Perinephric/ periureteric stranding, no calculi identified   - C/w Tamsulosin; Monitor I and O   - As per Urology, patient will require close outpatient follow up and work up for UPJ obstruction   - Urology eval appreciated; F/u recs    Hemorrhage   - Seen on CT as above  - Check serial CBC BID   - Duplex with possible soleal muscle hematoma ? -- Monitor H/H --> Repeat Duplex in 1 week --> ordered for 1/19   - Monitor H/H closely. Transfuse for Hgb < 7.0   - Urology eval appreciated; F/u recs    DVT  - LE Duplex w/ B/L soleal vein and right peroneal vein thrombus, Acute DVT below the knee    - Was on Lovenox for  Mg Qd. 100 BID resumed, Monitor   - Vascular Cardiology eval appreciated; F/u recs --> Duplex 1/12 with resolution of DVT   - Hematology eval appreciated; F/u recs  -->As per discussion with Heme/Onc 1/17, patient to continue full dose AC at this time     Seizure   - Per history   - C/w Vimpat 150 BID, off of Keppra as per Neuro.   - Seizure precautions  - Neuro eval appreciated; F/u recs    CVA  - Per History   - On ASA, Lovenox and Statin  - Neuro eval appreciated     ADHD  - Vyvanse 50 Mg not available in patient. Resume on DC  - Fall, Seizure, Aspiration precautions  - Neuro eval appreciated; F/u recs     LLE Fasciotomy   - 2/2 compartment syndrome.   - Vascular surgery eval appreciated; F/u recs --> No acute intervention at this time     HLD  - Lipid panel noted; C/w Lipitor 20    Vertigo  - Per History. No longer on Meclizine per patient  - Fall, Seizure and Aspiration precautions   - Ambulate with Assistance  - PT eval     PPX  - Lovenox   - PPI       Discussed with Attending, Heme/Onc and ACP.  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 Patient reports that when his hematuria began, he attributed it to increase intake of cranberry juice. Patient states that when his hematuria did not subside, he presented to his outpatient Urologist's office for which he was started on Cipro for UTI and only took one dose. Patient also endorses left flank pain. Patient denies history of nephrolithiasis or previous episodes of hematuria. Patient denies dysuria, frequency or urgency, chills, body aches, nausea, vomiting, headache, lightheadedness or dizziness.       Hematuria   - Pt with waxing and waning gross hematuria; CT w/ hemorrhage    - Check CBC BID - Monitor H/H and Hgb closely --> F/u Repeat CBC, mild drop in Hgb -    - Was resumed on full dose AC. With recurrent hematuria. Urology follow up appreciated   - CT Urogram as noted, ? neoplasm; Urology and IR follow up appreciated --> Plan for repeat CT urogram once hematuria resolves to R/O underlying neoplasm. As per discussion with Heme/Onc and IR 1/17, patient to continue AC at this time.   - Rack urine to assess color output  - Check PVR   - Mild down-trend in Hgb. Check CBC at 17:00   - IR eval for possible embolization appreciated; F/u recs --> pending repeat CT once hematuria resolves   - Urology evaluation appreciated; F/u recs    UTI   - With associated flank pain - now resolved. UA grossly positive.    - S/P course of Rocephin. UCx --> negative   - Hydration as tolerated   - Monitor CBC, Temp Curve, VS and patient closely     Hydronephrosis   - CT w/ mild L hydronephrosis with abrupt tapering of the dilated renal collecting system at the level of the UPJ, Hyperdense material within the dilated L upper renal pole most likely representing hemorrhage, Moderate L Perinephric/ periureteric stranding, no calculi identified   - C/w Tamsulosin; Monitor I and O   - As per Urology, patient will require close outpatient follow up and work up for UPJ obstruction   - Urology eval appreciated; F/u recs    Hemorrhage   - Seen on CT as above  - Check serial CBC BID   - Duplex with possible soleal muscle hematoma ? -- Monitor H/H --> Repeat Duplex in 1 week --> ordered for 1/19   - Monitor H/H closely. Transfuse for Hgb < 7.0   - Urology eval appreciated; F/u recs    DVT  - LE Duplex w/ B/L soleal vein and right peroneal vein thrombus, Acute DVT below the knee    - Was on Lovenox for  Mg Qd. 100 BID resumed, Monitor   - Vascular Cardiology eval appreciated; F/u recs --> Duplex 1/12 with resolution of DVT   - Hematology eval appreciated; F/u recs  -->As per discussion with Heme/Onc 1/17, patient to continue full dose AC at this time     Seizure   - Per history   - C/w Vimpat 150 BID, off of Keppra as per Neuro.   - Seizure precautions  - Neuro eval appreciated; F/u recs    CVA  - Per History   - On ASA, Lovenox and Statin  - Neuro eval appreciated     ADHD  - Vyvanse 50 Mg not available in patient. Resume on DC  - Fall, Seizure, Aspiration precautions  - Neuro eval appreciated; F/u recs     LLE Fasciotomy   - 2/2 compartment syndrome.   - Vascular surgery eval appreciated; F/u recs --> No acute intervention at this time     HLD  - Lipid panel noted; C/w Lipitor 20    Vertigo  - Per History. No longer on Meclizine per patient  - Fall, Seizure and Aspiration precautions   - Ambulate with Assistance  - PT eval     PPX  - Lovenox   - PPI       Discussed with Attending, Heme/Onc and ACP.

## 2024-01-17 NOTE — PROGRESS NOTE ADULT - SUBJECTIVE AND OBJECTIVE BOX
Patient is a 53y old  Male who presents with a chief complaint of Hematuria (17 Jan 2024 14:01)    No acute overnight events/changes.  Patient seen and examined.  reports ongoing hematuria    MEDICATIONS  (STANDING):  aspirin enteric coated 81 milliGRAM(s) Oral daily  atorvastatin 20 milliGRAM(s) Oral at bedtime  enoxaparin Injectable 100 milliGRAM(s) SubCutaneous every 12 hours  gabapentin 300 milliGRAM(s) Oral two times a day  lacosamide 150 milliGRAM(s) Oral two times a day  multivitamin 1 Tablet(s) Oral daily  pantoprazole    Tablet 40 milliGRAM(s) Oral before breakfast  sodium chloride 0.9%. 1000 milliLiter(s) (50 mL/Hr) IV Continuous <Continuous>  tamsulosin 0.4 milliGRAM(s) Oral at bedtime    MEDICATIONS  (PRN):      Vital Signs Last 24 Hrs  T(C): 36.6 (17 Jan 2024 12:09), Max: 36.6 (17 Jan 2024 12:09)  T(F): 97.8 (17 Jan 2024 12:09), Max: 97.8 (17 Jan 2024 12:09)  HR: 63 (17 Jan 2024 12:09) (59 - 64)  BP: 113/69 (17 Jan 2024 12:09) (100/60 - 114/79)  BP(mean): --  RR: 18 (17 Jan 2024 12:09) (18 - 18)  SpO2: 95% (17 Jan 2024 12:09) (95% - 98%)    Parameters below as of 17 Jan 2024 12:09  Patient On (Oxygen Delivery Method): room air        PE  Awake, alert  Anicteric, MMM  RRR  CTAB  Abd soft, NT, ND  No c/c/e  No rash grossly  FROM                          11.5   6.27  )-----------( 180      ( 17 Jan 2024 07:38 )             34.1       01-17    139  |  107  |  12  ----------------------------<  89  3.6   |  22  |  0.93    Ca    8.2<L>      17 Jan 2024 07:40  Phos  3.4     01-17  Mg     2.1     01-17    ACC: [id number] EXAM:  CT ABD PELV UROGRAM WAW IC   ORDERED BY: NICHOLAS FIGUEROA     PROCEDURE DATE:  01/15/2024          INTERPRETATION:  CLINICAL INFORMATION: Patient with bilateral DVTs on   Lovenox and aspirin presenting with gross hematuria, intermittent   suprapubic and left flank pain, and with recent CT showing mild left   hydronephrosis.    ADDITIONAL CLINICAL INFORMATION: Gross Hematuria R31.0    COMPARISON: CT abdomen and pelvis 1/10/2024 and 1/1/2023.    CONTRAST/COMPLICATIONS:  IV Contrast: Omnipaque 350  90 cc administered   10 cc discarded  Oral Contrast: NONE  Complications: None reported at time of study completion    PROCEDURE:  CT of the Abdomen and Pelvis was performed.  Precontrast, Nephrographic and Excretory phases were acquired (CT   UROGRAM).  Sagittal and coronal reformats were performed.    FINDINGS:  LOWER CHEST: Within normal limits.    LIVER: Enlarged, measures 19.0 cm craniocaudally in the right lobe. Left   hepatic cyst measuring 1.0 cm.  BILE DUCTS: Normal caliber.  GALLBLADDER: Within normal limits.  SPLEEN: Within normal limits.  PANCREAS: Within normal limits.  ADRENALS: Within normal limits.  KIDNEYS/URETERS:  Mild symmetric fullness of the bilateral renal collecting systems with   abrupt narrowing at the level of the ureteropelvic junctions, including   fullness of bilateral extrarenal pelvises, similar in degree on the left   but increased on the right since 1/10/2024. Symmetric fullness of the   collecting systems is noted to have been present on 1/1/2023 as well,   with the degree on the current exam similar to mildly increased since   that time. No hydroureter. Renally excreted contrast traverses the   bilateral ureteropelvic junctions to opacify the ureters.    No discrete right upper tract urothelial lesion identified. Irregular   ill-defined hyperdensity within the left renal collecting system seen on   precontrast imaging with irregular filling defects seen in the upper pole   calyces, lower pole calyces, and the renal pelvis during the excretory   phase, suggestive of hemorrhagic content/blood clot. High density   material appears overall decreased since the CT of 1/10/2024. No definite   solid enhancing soft tissue lesion in the left collecting system or UPJ   is visualized, though evaluation for a subtle enhancing lesion on a   background of dense blood clot may be limited. Mild diffuse urothelial   thickening and enhancement of the left renal collecting system with mild   adjacent fat stranding.    Focal cortical scarring with associated punctate calcification in the   upper pole of the right kidney. No evidence of obstructive urolithiasis.    BLADDER: Within normal limits.  REPRODUCTIVE ORGANS: Borderline enlarged prostate.    BOWEL: No bowel obstruction. Appendix is normal.  PERITONEUM: No ascites.  VESSELS: Circumaortic left renal vein. Atherosclerotic changes.  RETROPERITONEUM/LYMPH NODES: No lymphadenopathy.  ABDOMINAL WALL: Foci of gas and infiltration within the ventral abdominal   wall subcutaneously, which could be due to subcutaneous injections. Small   fat-containing umbilical hernia. Small fat-containing left inguinal   hernia. Nodular and reticular infiltration in the bilateral anterior   thighs subcutaneously, similar to 1/10/2024, and also potentially related   to recent subcutaneous injections.  BONES: Degenerative changes.    IMPRESSION:  *  Mild symmetric fullness of the BILATERAL renal collecting systems to   the level of the UPJs.  *  Irregular ill-defined hyperdensity and filling defects within the LEFT   upper pole calyces, lower pole calyces, renal pelvis, suggestive of   hemorrhagic content/blood clot. High density material is overall   decreased since 1/10/2024. No definite solid enhancing soft tissue lesion   is identified in the LEFT renal collecting system or UPJ, though   evaluation for a subtle enhancing lesion on a background of dense blood   clot may be limited. Recommend continued follow-up to ensure resolution   and to exclude the possibility of underlying mass.  *  Mild diffuse urothelial thickening and enhancement of the LEFT   collecting system, which could be due to reactive inflammation or   infection. Correlate with urinalysis.  *  No discrete RIGHT-sided upper tract urothelial lesion is identified.  *  No evidence of obstructive urolithiasis.  *  Nodular reticular infiltration in the bilateral anterior thighs   subcutaneously, potentially related to recent subcutaneous injections.   Correlate with patient history.        --- End of Report ---

## 2024-01-17 NOTE — PROGRESS NOTE ADULT - SUBJECTIVE AND OBJECTIVE BOX
Subjective  Patient seen and examined. Resting comfortably. States urine is still clear red.    Objective    Vital signs  T(F): , Max: 97.8 (01-16-24 @ 12:30)  HR: 59 (01-17-24 @ 06:02)  BP: 103/66 (01-17-24 @ 06:02)  SpO2: 95% (01-17-24 @ 06:02)  Wt(kg): --    Output     OUT:    Voided (mL): 1300 mL  Total OUT: 1300 mL    Total NET: -1300 mL      Gen: NAD  Abd: soft, nontender, nondistended  : Voiding dark red urine    Labs      01-17 @ 07:40    WBC --    / Hct --    / SCr 0.93     01-17 @ 07:38    WBC 6.27  / Hct 34.1  / SCr --             Urine Cx: ?  Blood Cx: ?    Imaging

## 2024-01-17 NOTE — PROGRESS NOTE ADULT - ASSESSMENT
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    hx DVT  --patient w/ known history of R LE thrombosis and echogenic linear thrombus in L popliteal vain on b/l LE doppler performed 5/3/23. follow up study done 8/28/23 w/ noted chronic post thrombotic changes but no evidence of DVT  --LE duplex done inpatient on 1/10 w/ Thrombus in the bilateral soleal veins and right peroneal vein.  --outpatient hypercoagulable was negative  --patient previously treated w/ Eliquis but transitioned to Lovenox d/t therapy failure   --on Lovenox 100mg  BID  --will hold AC to repeat CT urogram to r/o underlying malignancy  --vascular surgery consulted, no surgical intervention recommended at this time    hematuria/UTI  --CT a/p w/  Mild left hydronephrosis with abrupt tapering at the ureteropelvic junction. There is evidence of hemorrhage into the dilated left upper   renal pole collecting system and associated inflammatory changes throughout the perinephric fat. No radiodense calculi identifie  --s/p ceftriaxone for UTI/ hydronephrosis   --renal and urology following  --IR consulted for possible embolization  --plan to repeat CT-urogram once urine clears    will follow,    Luzmaria Xiong NP  Hematology/ Oncology  [facility]  [phone] (office)  [phone] (alt office)  Evenings and weekends please call MD on call or office

## 2024-01-17 NOTE — PROGRESS NOTE ADULT - ATTENDING COMMENTS
Encounter addended by: Dorothy Ulrich LADC on: 1/28/2023 11:45 AM   Actions taken: Clinical Note Signed Patient seen and examined. Agree with assessment and plan.

## 2024-01-17 NOTE — PROGRESS NOTE ADULT - SUBJECTIVE AND OBJECTIVE BOX
Name of Patient : TAMI DUNHAM  MRN: [id number]  Date of visit: 01-17-24 @ 14:02      Subjective: Patient seen and examined. No new events except as noted.   Patient seen earlier this AM.  Reports continued hematuria. Continues on AC.   CT scan results reviewed with patient.     REVIEW OF SYSTEMS:    CONSTITUTIONAL: Generalized weakness   EYES/ENT: No visual changes;  No vertigo or throat pain   NECK: No pain or stiffness  RESPIRATORY: No cough, wheezing, hemoptysis; No shortness of breath  CARDIOVASCULAR: No chest pain or palpitations  GASTROINTESTINAL: No abdominal or epigastric pain. No nausea, vomiting, or hematemesis; No diarrhea or constipation. No melena or hematochezia.  GENITOURINARY: +Ongoing Hematuria   NEUROLOGICAL: + H/O CVA; Residual word finding difficulty  SKIN: + S/P L Fasciotomy; LE skin changes; + Upper thigh ecchymosis at Lovenox inj site; Denies itching   All other review of systems is negative unless indicated above.    MEDICATIONS:  MEDICATIONS  (STANDING):  aspirin enteric coated 81 milliGRAM(s) Oral daily  atorvastatin 20 milliGRAM(s) Oral at bedtime  enoxaparin Injectable 100 milliGRAM(s) SubCutaneous every 12 hours  gabapentin 300 milliGRAM(s) Oral two times a day  lacosamide 150 milliGRAM(s) Oral two times a day  multivitamin 1 Tablet(s) Oral daily  pantoprazole    Tablet 40 milliGRAM(s) Oral before breakfast  sodium chloride 0.9%. 1000 milliLiter(s) (50 mL/Hr) IV Continuous <Continuous>  tamsulosin 0.4 milliGRAM(s) Oral at bedtime      PHYSICAL EXAM:  T(C): 36.6 (01-17-24 @ 12:09), Max: 36.6 (01-17-24 @ 12:09)  HR: 63 (01-17-24 @ 12:09) (59 - 64)  BP: 113/69 (01-17-24 @ 12:09) (100/60 - 114/79)  RR: 18 (01-17-24 @ 12:09) (18 - 18)  SpO2: 95% (01-17-24 @ 12:09) (95% - 98%)  Wt(kg): --  I&O's Summary    16 Jan 2024 07:01  -  17 Jan 2024 07:00  --------------------------------------------------------  IN: 1320 mL / OUT: 1300 mL / NET: 20 mL    17 Jan 2024 07:01  -  17 Jan 2024 14:02  --------------------------------------------------------  IN: 480 mL / OUT: 700 mL / NET: -220 mL          Appearance: Awake, Sitting up in bed, in no acute distress   HEENT: Eyes are open   Lymphatic: No lymphadenopathy grossly   Cardiovascular: Normal S1 S2   Respiratory: normal effort, clear  Gastrointestinal:  Soft, Non-tender  Skin: + B/L thigh ecchymosis at Lovenox injection sites  Psychiatry:  Mood & affect appropriate  Musculoskeletal: + Prior LLE Fasciotomy; Chronic skin changes          01-16-24 @ 07:01  -  01-17-24 @ 07:00  --------------------------------------------------------  IN: 1320 mL / OUT: 1300 mL / NET: 20 mL    01-17-24 @ 07:01  -  01-17-24 @ 14:02  --------------------------------------------------------  IN: 480 mL / OUT: 700 mL / NET: -220 mL                                  11.5   6.27  )-----------( 180      ( 17 Jan 2024 07:38 )             34.1               01-17    139  |  107  |  12  ----------------------------<  89  3.6   |  22  |  0.93    Ca    8.2<L>      17 Jan 2024 07:40  Phos  3.4     01-17  Mg     2.1     01-17                    Urinalysis Basic - ( 17 Jan 2024 07:40 )    Color: x / Appearance: x / SG: x / pH: x  Gluc: 89 mg/dL / Ketone: x  / Bili: x / Urobili: x   Blood: x / Protein: x / Nitrite: x   Leuk Esterase: x / RBC: x / WBC x   Sq Epi: x / Non Sq Epi: x / Bacteria: x

## 2024-01-17 NOTE — PROGRESS NOTE ADULT - ASSESSMENT
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  Saw his urologist  was started on an antibioti. Patient also reports several episodes of severe intermittent left flank pain today, denies history of kidney stones to his knowledge.  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  f/u  . f/u H/h    -  AC when restarted ; lovenox restarted but now BID   - for ADHD gets Vyvanse 50mg daily  - for seizure he is on vimpat should now be 150mg BID  and was taken off keppra   - f/u heme/onc, new dvt was on lovenox ; now changed to BID dosing   - for secondary stroke prevention he is on asa 81mg daily and full dose lovenox .  - statin therapy with LDL goal < 70mg/dL; atorvastatin 20 at home   - f/u  for flank pain, hematuria and obstruction   - abx for UTI   - PT/OT   - meclizion prn for vertigo   - check FS, glucose control <180  - GI/DVT    - Thank you for allowing me to participate in the care of this patient. Call with questions.   known to me from office   Braxton Forde MD  Vascular Neurology  Office: [phone]

## 2024-01-17 NOTE — PROGRESS NOTE ADULT - SUBJECTIVE AND OBJECTIVE BOX
Neurology        S: patient seen, resting in bed  + hematuria           Medications: MEDICATIONS  (STANDING):  aspirin enteric coated 81 milliGRAM(s) Oral daily  atorvastatin 20 milliGRAM(s) Oral at bedtime  enoxaparin Injectable 100 milliGRAM(s) SubCutaneous every 12 hours  gabapentin 300 milliGRAM(s) Oral two times a day  lacosamide 150 milliGRAM(s) Oral two times a day  multivitamin 1 Tablet(s) Oral daily  pantoprazole    Tablet 40 milliGRAM(s) Oral before breakfast  sodium chloride 0.9%. 1000 milliLiter(s) (50 mL/Hr) IV Continuous <Continuous>  tamsulosin 0.4 milliGRAM(s) Oral at bedtime    MEDICATIONS  (PRN):       Vitals:  Vital Signs Last 24 Hrs  T(C): 36.3 (17 Jan 2024 06:02), Max: 36.6 (16 Jan 2024 12:30)  T(F): 97.4 (17 Jan 2024 06:02), Max: 97.8 (16 Jan 2024 12:30)  HR: 59 (17 Jan 2024 06:02) (59 - 66)  BP: 103/66 (17 Jan 2024 06:02) (99/60 - 114/79)  BP(mean): --  RR: 18 (17 Jan 2024 06:02) (18 - 18)  SpO2: 95% (17 Jan 2024 06:02) (86% - 98%)    Parameters below as of 17 Jan 2024 06:02  Patient On (Oxygen Delivery Method): room air                    General Exam:   General Appearance: Appropriately dressed and in no acute distress       Head: Normocephalic, atraumatic and no dysmorphic features  Ear, Nose, and Throat: Moist mucous membranes  CVS: S1S2+  Resp: No SOB, no wheeze or rhonchi  GI: soft NT/ND  Extremities: No edema or cyanosis  Skin: No bruises or rashes     Neurological Exam:  MS: Eyes open. Awake, alert, oriented to person, place, situation, time. Follows all commands. Attention/concentration, recent and remote memory, and fund of knowledge intact  Language: Speech is clear, fluent with good repetition & comprehension.  CNs: PERRL (R = 3mm, L = 3mm). VFF. EOMI No nystagmus.  No provocation on dizziness with head turning. V1-3 intact to LT b/l. No facial asymmetry b/l, full eye closure strength b/l. Hearing grossly normal (rubbing fingers) b/l. Tongue midline, normal movements, no atrophy. Palate with symmetric elevation. Trap 5/5 b/l  Motor: Normal muscle bulk & tone. No noticeable tremor. No pronator drift. No drift of UE or LE b/l.               Deltoid	Biceps	Triceps	   R	         5	      5	   5	 5	  		 	  L	         5	      5	   5	 5	  		    	H-Flex	K-Flex	K-Ext	D-Flex	P-Flex  R	    5	  5	   5	  5	    5		   L	    5	   5	   5	  4	    5		   *Some pain limitation on LLE.   Sensation: Intact to LT b/l throughout.   Cortical: Extinction on DSS (neglect): none  Reflexes:              Biceps(C5)       BR(C6)     Triceps(C7)               Patellar(L4)    Achilles(S1)    Plantar Resp  R	              3	          3	             3		 3		    3	Withdrawal       L	              3	          3	             3		 2		    1	Withdrawal    Coordination: No dysmetria to FTN/HTS b/l.   Gait: Deferred.     Data/Labs/Imaging which I personally reviewed.         LABS:                          11.5   6.27  )-----------( 180      ( 17 Jan 2024 07:38 )             34.1     01-17    139  |  107  |  12  ----------------------------<  89  3.6   |  22  |  0.93    Ca    8.2<L>      17 Jan 2024 07:40  Phos  3.4     01-17  Mg     2.1     01-17          Urinalysis Basic - ( 17 Jan 2024 07:40 )    Color: x / Appearance: x / SG: x / pH: x  Gluc: 89 mg/dL / Ketone: x  / Bili: x / Urobili: x   Blood: x / Protein: x / Nitrite: x   Leuk Esterase: x / RBC: x / WBC x   Sq Epi: x / Non Sq Epi: x / Bacteria: x

## 2024-01-18 LAB
ANION GAP SERPL CALC-SCNC: 10 MMOL/L — SIGNIFICANT CHANGE UP (ref 5–17)
BASOPHILS # BLD AUTO: 0.07 K/UL — SIGNIFICANT CHANGE UP (ref 0–0.2)
BASOPHILS NFR BLD AUTO: 1.1 % — SIGNIFICANT CHANGE UP (ref 0–2)
BUN SERPL-MCNC: 13 MG/DL — SIGNIFICANT CHANGE UP (ref 7–23)
CALCIUM SERPL-MCNC: 8.4 MG/DL — SIGNIFICANT CHANGE UP (ref 8.4–10.5)
CHLORIDE SERPL-SCNC: 110 MMOL/L — HIGH (ref 96–108)
CO2 SERPL-SCNC: 23 MMOL/L — SIGNIFICANT CHANGE UP (ref 22–31)
CREAT SERPL-MCNC: 0.92 MG/DL — SIGNIFICANT CHANGE UP (ref 0.5–1.3)
EGFR: 99 ML/MIN/1.73M2 — SIGNIFICANT CHANGE UP
EOSINOPHIL # BLD AUTO: 0.33 K/UL — SIGNIFICANT CHANGE UP (ref 0–0.5)
EOSINOPHIL NFR BLD AUTO: 5.4 % — SIGNIFICANT CHANGE UP (ref 0–6)
GLUCOSE SERPL-MCNC: 85 MG/DL — SIGNIFICANT CHANGE UP (ref 70–99)
HCT VFR BLD CALC: 33.4 % — LOW (ref 39–50)
HGB BLD-MCNC: 11.3 G/DL — LOW (ref 13–17)
IMM GRANULOCYTES NFR BLD AUTO: 0.5 % — SIGNIFICANT CHANGE UP (ref 0–0.9)
LYMPHOCYTES # BLD AUTO: 1.7 K/UL — SIGNIFICANT CHANGE UP (ref 1–3.3)
LYMPHOCYTES # BLD AUTO: 27.9 % — SIGNIFICANT CHANGE UP (ref 13–44)
MAGNESIUM SERPL-MCNC: 2.1 MG/DL — SIGNIFICANT CHANGE UP (ref 1.6–2.6)
MCHC RBC-ENTMCNC: 30.5 PG — SIGNIFICANT CHANGE UP (ref 27–34)
MCHC RBC-ENTMCNC: 33.8 GM/DL — SIGNIFICANT CHANGE UP (ref 32–36)
MCV RBC AUTO: 90 FL — SIGNIFICANT CHANGE UP (ref 80–100)
MONOCYTES # BLD AUTO: 0.58 K/UL — SIGNIFICANT CHANGE UP (ref 0–0.9)
MONOCYTES NFR BLD AUTO: 9.5 % — SIGNIFICANT CHANGE UP (ref 2–14)
NEUTROPHILS # BLD AUTO: 3.38 K/UL — SIGNIFICANT CHANGE UP (ref 1.8–7.4)
NEUTROPHILS NFR BLD AUTO: 55.6 % — SIGNIFICANT CHANGE UP (ref 43–77)
NRBC # BLD: 0 /100 WBCS — SIGNIFICANT CHANGE UP (ref 0–0)
PHOSPHATE SERPL-MCNC: 3.7 MG/DL — SIGNIFICANT CHANGE UP (ref 2.5–4.5)
PLATELET # BLD AUTO: 167 K/UL — SIGNIFICANT CHANGE UP (ref 150–400)
POTASSIUM SERPL-MCNC: 3.8 MMOL/L — SIGNIFICANT CHANGE UP (ref 3.5–5.3)
POTASSIUM SERPL-SCNC: 3.8 MMOL/L — SIGNIFICANT CHANGE UP (ref 3.5–5.3)
RBC # BLD: 3.71 M/UL — LOW (ref 4.2–5.8)
RBC # FLD: 13 % — SIGNIFICANT CHANGE UP (ref 10.3–14.5)
SODIUM SERPL-SCNC: 143 MMOL/L — SIGNIFICANT CHANGE UP (ref 135–145)
WBC # BLD: 6.09 K/UL — SIGNIFICANT CHANGE UP (ref 3.8–10.5)
WBC # FLD AUTO: 6.09 K/UL — SIGNIFICANT CHANGE UP (ref 3.8–10.5)

## 2024-01-18 RX ADMIN — LACOSAMIDE 150 MILLIGRAM(S): 50 TABLET ORAL at 17:14

## 2024-01-18 RX ADMIN — LACOSAMIDE 150 MILLIGRAM(S): 50 TABLET ORAL at 06:47

## 2024-01-18 RX ADMIN — Medication 1 TABLET(S): at 11:13

## 2024-01-18 RX ADMIN — GABAPENTIN 300 MILLIGRAM(S): 400 CAPSULE ORAL at 17:14

## 2024-01-18 RX ADMIN — TAMSULOSIN HYDROCHLORIDE 0.4 MILLIGRAM(S): 0.4 CAPSULE ORAL at 22:16

## 2024-01-18 RX ADMIN — ATORVASTATIN CALCIUM 20 MILLIGRAM(S): 80 TABLET, FILM COATED ORAL at 22:16

## 2024-01-18 RX ADMIN — GABAPENTIN 300 MILLIGRAM(S): 400 CAPSULE ORAL at 06:48

## 2024-01-18 RX ADMIN — PANTOPRAZOLE SODIUM 40 MILLIGRAM(S): 20 TABLET, DELAYED RELEASE ORAL at 06:48

## 2024-01-18 RX ADMIN — Medication 81 MILLIGRAM(S): at 11:13

## 2024-01-18 RX ADMIN — ENOXAPARIN SODIUM 100 MILLIGRAM(S): 100 INJECTION SUBCUTANEOUS at 06:48

## 2024-01-18 NOTE — PROGRESS NOTE ADULT - SUBJECTIVE AND OBJECTIVE BOX
Name of Patient : TAMI DUNHAM  MRN: [id number]  Date of visit: 01-18-24 @ 12:45      Subjective: Patient seen and examined. No new events except as noted.   Patient seen earlier this AM. Patient reports persistent hematuria.     REVIEW OF SYSTEMS:    CONSTITUTIONAL: Generalized weakness   EYES/ENT: No visual changes;  No vertigo or throat pain   NECK: No pain or stiffness  RESPIRATORY: No cough, wheezing, hemoptysis; No shortness of breath  CARDIOVASCULAR: No chest pain or palpitations  GASTROINTESTINAL: No abdominal or epigastric pain. No nausea, vomiting, or hematemesis; No diarrhea or constipation. No melena or hematochezia.  GENITOURINARY: + Persistent Hematuria   NEUROLOGICAL: + H/O CVA; Residual word finding difficulty  SKIN: + S/P L Fasciotomy; LLE skin changes ; Denies itching   All other review of systems is negative unless indicated above.    MEDICATIONS:  MEDICATIONS  (STANDING):  aspirin enteric coated 81 milliGRAM(s) Oral daily  atorvastatin 20 milliGRAM(s) Oral at bedtime  enoxaparin Injectable 100 milliGRAM(s) SubCutaneous every 12 hours  gabapentin 300 milliGRAM(s) Oral two times a day  lacosamide 150 milliGRAM(s) Oral two times a day  multivitamin 1 Tablet(s) Oral daily  pantoprazole    Tablet 40 milliGRAM(s) Oral before breakfast  sodium chloride 0.9%. 1000 milliLiter(s) (50 mL/Hr) IV Continuous <Continuous>  tamsulosin 0.4 milliGRAM(s) Oral at bedtime      PHYSICAL EXAM:  T(C): 36.5 (01-18-24 @ 04:38), Max: 36.7 (01-18-24 @ 00:36)  HR: 60 (01-18-24 @ 04:38) (59 - 67)  BP: 108/68 (01-18-24 @ 04:38) (108/68 - 123/78)  RR: 18 (01-18-24 @ 04:38) (18 - 18)  SpO2: 97% (01-18-24 @ 04:38) (97% - 98%)  Wt(kg): --  I&O's Summary    17 Jan 2024 07:01  -  18 Jan 2024 07:00  --------------------------------------------------------  IN: 1800 mL / OUT: 3070 mL / NET: -1270 mL    18 Jan 2024 07:01  -  18 Jan 2024 12:45  --------------------------------------------------------  IN: 360 mL / OUT: 500 mL / NET: -140 mL          Appearance: Normal	  HEENT:  PERRLA   Lymphatic: No lymphadenopathy   Cardiovascular: Normal S1 S2, no JVD  Respiratory: normal effort , clear  Gastrointestinal:  Soft, Non-tender  Skin: No rashes,  warm to touch  Psychiatry:  Mood & affect appropriate  Musculuskeletal: No edema      All labs, Imaging and EKGs personally reviewed                 01-17-24 @ 07:01 - 01-18-24 @ 07:00  --------------------------------------------------------  IN: 1800 mL / OUT: 3070 mL / NET: -1270 mL    01-18-24 @ 07:01 - 01-18-24 @ 12:45  --------------------------------------------------------  IN: 360 mL / OUT: 500 mL / NET: -140 mL             Name of Patient : TAMI DUNHAM  MRN: [id number]  Date of visit: 01-18-24 @ 12:45      Subjective: Patient seen and examined. No new events except as noted.   Patient seen earlier this AM. Patient reports persistent hematuria.     REVIEW OF SYSTEMS:    CONSTITUTIONAL: Generalized weakness   EYES/ENT: No visual changes;  No vertigo or throat pain   NECK: No pain or stiffness  RESPIRATORY: No cough, wheezing, hemoptysis; No shortness of breath  CARDIOVASCULAR: No chest pain or palpitations  GASTROINTESTINAL: No abdominal or epigastric pain. No nausea, vomiting, or hematemesis; No diarrhea or constipation. No melena or hematochezia.  GENITOURINARY: + Persistent Hematuria   NEUROLOGICAL: + H/O CVA; Residual word finding difficulty  SKIN: + S/P L Fasciotomy; LLE skin changes ; Denies itching   All other review of systems is negative unless indicated above.    MEDICATIONS:  MEDICATIONS  (STANDING):  aspirin enteric coated 81 milliGRAM(s) Oral daily  atorvastatin 20 milliGRAM(s) Oral at bedtime  enoxaparin Injectable 100 milliGRAM(s) SubCutaneous every 12 hours  gabapentin 300 milliGRAM(s) Oral two times a day  lacosamide 150 milliGRAM(s) Oral two times a day  multivitamin 1 Tablet(s) Oral daily  pantoprazole    Tablet 40 milliGRAM(s) Oral before breakfast  sodium chloride 0.9%. 1000 milliLiter(s) (50 mL/Hr) IV Continuous <Continuous>  tamsulosin 0.4 milliGRAM(s) Oral at bedtime      PHYSICAL EXAM:  T(C): 36.5 (01-18-24 @ 04:38), Max: 36.7 (01-18-24 @ 00:36)  HR: 60 (01-18-24 @ 04:38) (59 - 67)  BP: 108/68 (01-18-24 @ 04:38) (108/68 - 123/78)  RR: 18 (01-18-24 @ 04:38) (18 - 18)  SpO2: 97% (01-18-24 @ 04:38) (97% - 98%)  Wt(kg): --  I&O's Summary    17 Jan 2024 07:01  -  18 Jan 2024 07:00  --------------------------------------------------------  IN: 1800 mL / OUT: 3070 mL / NET: -1270 mL    18 Jan 2024 07:01  -  18 Jan 2024 12:45  --------------------------------------------------------  IN: 360 mL / OUT: 500 mL / NET: -140 mL        Appearance: Awake, Sitting up in bed, in no acute distress   HEENT: Eyes are open   Lymphatic: No lymphadenopathy grossly   Cardiovascular: Normal S1 S2   Respiratory: normal effort, clear  Gastrointestinal:  Soft, Non-tender  Skin: + B/L thigh ecchymosis at Lovenox injection sites  Psychiatry:  Mood & affect appropriate  Musculoskeletal: + Prior LLE Fasciotomy; Chronic skin changes          01-17-24 @ 07:01  -  01-18-24 @ 07:00  --------------------------------------------------------  IN: 1800 mL / OUT: 3070 mL / NET: -1270 mL    01-18-24 @ 07:01 - 01-18-24 @ 12:45  --------------------------------------------------------  IN: 360 mL / OUT: 500 mL / NET: -140 mL                                  11.3   6.09  )-----------( 167      ( 18 Jan 2024 07:00 )             33.4               01-18    143  |  110<H>  |  13  ----------------------------<  85  3.8   |  23  |  0.92    Ca    8.4      18 Jan 2024 07:02  Phos  3.7     01-18  Mg     2.1     01-18                         Urinalysis Basic - ( 18 Jan 2024 07:02 )    Color: x / Appearance: x / SG: x / pH: x  Gluc: 85 mg/dL / Ketone: x  / Bili: x / Urobili: x   Blood: x / Protein: x / Nitrite: x   Leuk Esterase: x / RBC: x / WBC x   Sq Epi: x / Non Sq Epi: x / Bacteria: x

## 2024-01-18 NOTE — PROGRESS NOTE ADULT - SUBJECTIVE AND OBJECTIVE BOX
Subjective  Patient seen and examined. Resting comfortably. States urine is still red.    Objective    Vital signs  T(F): , Max: 98.1 (01-18-24 @ 00:36)  HR: 60 (01-18-24 @ 04:38)  BP: 108/68 (01-18-24 @ 04:38)  SpO2: 97% (01-18-24 @ 04:38)  Wt(kg): --    Output     OUT:    Voided (mL): 3070 mL  Total OUT: 3070 mL    Total NET: -3070 mL      OUT:    Voided (mL): 500 mL  Total OUT: 500 mL    Total NET: -500 mL      Gen: NAD  Abd: soft, nontender, nondistended  : Voiding dark red urine    Labs      01-18 @ 07:02    WBC --    / Hct --    / SCr 0.92     01-18 @ 07:00    WBC 6.09  / Hct 33.4  / SCr --

## 2024-01-18 NOTE — PROGRESS NOTE ADULT - ASSESSMENT
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 Patient reports that when his hematuria began, he attributed it to increase intake of cranberry juice. Patient states that when his hematuria did not subside, he presented to his outpatient Urologist's office for which he was started on Cipro for UTI and only took one dose. Patient also endorses left flank pain. Patient denies history of nephrolithiasis or previous episodes of hematuria. Patient denies dysuria, frequency or urgency, chills, body aches, nausea, vomiting, headache, lightheadedness or dizziness.       Hematuria   - Pt with waxing and waning gross hematuria; CT w/ hemorrhage    - Check CBC - Monitor H/H and Hgb closely --> Mild drop in Hgb, overall stable   - Was resumed on full dose AC. With recurrent hematuria. Urology follow up appreciated   - CT Urogram as noted, ? neoplasm; Urology and IR follow up appreciated --> Plan for repeat CT urogram once hematuria resolves to R/O underlying neoplasm. Patient on full dose AC -- In discussion with IR at this time regarding AC.   - Rack urine to assess color output  - Check PVR   - IR eval for possible embolization appreciated; F/u recs --> pending repeat CT once hematuria resolves   - Urology, IR, Heme/Onc evaluations appreciated; F/u recs    UTI   - With associated flank pain - now resolved. UA grossly positive.    - S/P course of Rocephin. UCx --> negative   - Hydration as tolerated   - Monitor CBC, Temp Curve, VS and patient closely     Hydronephrosis   - CT w/ mild L hydronephrosis with abrupt tapering of the dilated renal collecting system at the level of the UPJ, Hyperdense material within the dilated L upper renal pole most likely representing hemorrhage, Moderate L Perinephric/ periureteric stranding, no calculi identified   - C/w Tamsulosin; Monitor I and O   - As per Urology, patient will require close outpatient follow up and work up for UPJ obstruction   - Urology eval appreciated; F/u recs    Hemorrhage   - Seen on CT as above  - Duplex with possible soleal muscle hematoma ? -- Monitor H/H --> Repeat Duplex in 1 week --> ordered for 1/19   - Check serial CBC; Monitor H/H closely. Transfuse for Hgb < 7.0   - Urology eval appreciated; F/u recs    DVT  - LE Duplex w/ B/L soleal vein and right peroneal vein thrombus, Acute DVT below the knee    - Was on Lovenox for  Mg Qd. 100 BID resumed, Monitor   - Vascular Cardiology eval appreciated; F/u recs --> Duplex 1/12 with resolution of DVT, Check Duplex 1/19   - Hematology eval appreciated; F/u recs  --> Patient will require full dose AC on DC per Heme/Onc     Seizure   - Per history   - C/w Vimpat 150 BID, off of Keppra as per Neuro.   - Seizure precautions  - Neuro eval appreciated; F/u recs    CVA  - Per History   - On ASA, Lovenox and Statin  - Neuro eval appreciated     ADHD  - Vyvanse 50 Mg not available in patient. Resume on DC  - Fall, Seizure, Aspiration precautions  - Neuro eval appreciated; F/u recs     LLE Fasciotomy   - 2/2 compartment syndrome.   - Vascular surgery eval appreciated; F/u recs --> No acute intervention at this time     HLD  - Lipid panel noted; C/w Lipitor 20    Vertigo  - Per History. No longer on Meclizine per patient  - Fall, Seizure and Aspiration precautions   - Ambulate with Assistance  - PT eval     PPX  - Lovenox   - PPI       Discussed with Attending and ACP.       Dr. Ma will be covering from Friday 01/19 until Thursday 01/25.  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 Patient reports that when his hematuria began, he attributed it to increase intake of cranberry juice. Patient states that when his hematuria did not subside, he presented to his outpatient Urologist's office for which he was started on Cipro for UTI and only took one dose. Patient also endorses left flank pain. Patient denies history of nephrolithiasis or previous episodes of hematuria. Patient denies dysuria, frequency or urgency, chills, body aches, nausea, vomiting, headache, lightheadedness or dizziness.       Hematuria   - Pt with waxing and waning gross hematuria; CT w/ hemorrhage    - Check CBC - Monitor H/H and Hgb closely --> Mild drop in Hgb, overall stable   - Was resumed on full dose AC. With recurrent hematuria. Urology follow up appreciated   - CT Urogram as noted, ? neoplasm; Urology and IR follow up appreciated --> Plan for repeat CT urogram once hematuria resolves to R/O underlying neoplasm. Patient on full dose AC -- In discussion with IR at this time regarding AC. --> Plan to hold Lovenox 1/18 PM and 1/19 AM. Resume once cleared from IR post embolization.   - Rack urine to assess color output  - Check PVR   - IR eval for possible embolization appreciated; F/u recs --> pending repeat CT once hematuria resolves   - Urology, IR, Heme/Onc evaluations appreciated; F/u recs    UTI   - With associated flank pain - now resolved. UA grossly positive.    - S/P course of Rocephin. UCx --> negative   - Hydration as tolerated   - Monitor CBC, Temp Curve, VS and patient closely     Hydronephrosis   - CT w/ mild L hydronephrosis with abrupt tapering of the dilated renal collecting system at the level of the UPJ, Hyperdense material within the dilated L upper renal pole most likely representing hemorrhage, Moderate L Perinephric/ periureteric stranding, no calculi identified   - C/w Tamsulosin; Monitor I and O   - As per Urology, patient will require close outpatient follow up and work up for UPJ obstruction   - Urology eval appreciated; F/u recs    Hemorrhage   - Seen on CT as above  - Duplex with possible soleal muscle hematoma ? -- Monitor H/H --> Repeat Duplex in 1 week --> ordered for 1/19   - Check serial CBC; Monitor H/H closely. Transfuse for Hgb < 7.0   - Urology eval appreciated; F/u recs    DVT  - LE Duplex w/ B/L soleal vein and right peroneal vein thrombus, Acute DVT below the knee    - Was on Lovenox for  Mg Qd. 100 BID resumed, Monitor   - Vascular Cardiology eval appreciated; F/u recs --> Duplex 1/12 with resolution of DVT, Check Duplex 1/19   - Hematology eval appreciated; F/u recs  --> Patient will require full dose AC on DC per Heme/Onc     Seizure   - Per history   - C/w Vimpat 150 BID, off of Keppra as per Neuro.   - Seizure precautions  - Neuro eval appreciated; F/u recs    CVA  - Per History   - On ASA, Lovenox and Statin  - Neuro eval appreciated     ADHD  - Vyvanse 50 Mg not available in patient. Resume on DC  - Fall, Seizure, Aspiration precautions  - Neuro eval appreciated; F/u recs     LLE Fasciotomy   - 2/2 compartment syndrome.   - Vascular surgery eval appreciated; F/u recs --> No acute intervention at this time     HLD  - Lipid panel noted; C/w Lipitor 20    Vertigo  - Per History. No longer on Meclizine per patient  - Fall, Seizure and Aspiration precautions   - Ambulate with Assistance  - PT eval     PPX  - Lovenox   - PPI       Discussed with Attending and ACP.       Dr. Ma will be covering from Friday 01/19 until Thursday 01/25.

## 2024-01-18 NOTE — PROGRESS NOTE ADULT - SUBJECTIVE AND OBJECTIVE BOX
Patient is a 53y old  Male who presents with a chief complaint of Hematuria (18 Jan 2024 12:44)    Pt seen and examined. Reports ongoing hematuria.    MEDICATIONS  (STANDING):  aspirin enteric coated 81 milliGRAM(s) Oral daily  atorvastatin 20 milliGRAM(s) Oral at bedtime  enoxaparin Injectable 100 milliGRAM(s) SubCutaneous every 12 hours  gabapentin 300 milliGRAM(s) Oral two times a day  lacosamide 150 milliGRAM(s) Oral two times a day  multivitamin 1 Tablet(s) Oral daily  pantoprazole    Tablet 40 milliGRAM(s) Oral before breakfast  sodium chloride 0.9%. 1000 milliLiter(s) (50 mL/Hr) IV Continuous <Continuous>  tamsulosin 0.4 milliGRAM(s) Oral at bedtime    MEDICATIONS  (PRN):    Vital Signs Last 24 Hrs  T(C): 36.3 (18 Jan 2024 12:44), Max: 36.7 (18 Jan 2024 00:36)  T(F): 97.3 (18 Jan 2024 12:44), Max: 98.1 (18 Jan 2024 00:36)  HR: 67 (18 Jan 2024 12:44) (59 - 67)  BP: 110/53 (18 Jan 2024 12:44) (108/68 - 123/78)  BP(mean): --  RR: 18 (18 Jan 2024 12:44) (18 - 18)  SpO2: 98% (18 Jan 2024 12:44) (97% - 98%)    Parameters below as of 18 Jan 2024 12:44  Patient On (Oxygen Delivery Method): room air    PE  NAD  Awake, alert  Anicteric, MMM  No c/c/e  No rash grossly  FROM                        11.3   6.09  )-----------( 167      ( 18 Jan 2024 07:00 )             33.4       01-18    143  |  110<H>  |  13  ----------------------------<  85  3.8   |  23  |  0.92    Ca    8.4      18 Jan 2024 07:02  Phos  3.7     01-18  Mg     2.1     01-18

## 2024-01-18 NOTE — CHART NOTE - NSCHARTNOTEFT_GEN_A_CORE
Case extensively discussed between Dr. Garcia and primary team. Plan for left renal angio/embo tomorrow 1/19.     -Please keep pt NPO after midnight tn  -STAT am labs  -Please hold Lovenox tn and tomorrow morning.   -Please place IR procedure order under Dr. Garcia.

## 2024-01-18 NOTE — PROGRESS NOTE ADULT - ASSESSMENT
53y Male PMH CVA with residual word finding difficulty, bilateral DVTs on chronic Lovenox SQ and ASA81 (last dose yesterday), compartment syndrome status post left fasciotomy, HLD, PFO, vertigo, seizure disorder who presents to [facility] ED on 1/9/2024 for gross hematuria and intermittent suprapubic and L flank pain. CT abd pelvis showed mild L hydro to the level of the UPJ with associated blood products in the collecting system. Labs showed WBC 13.60, Hct 42.5, Cr 1.14. UA pending. PVR 55cc.    Recs:  -Need discussion with heme-onc to see need for continuing full AC at this time (they rec holding as of 1/18)  -After discussion with IR need a repeat CT urogram once hematuria clears to r/o underlying malignancy  -Hold AC and see if urine color clears. F/u IR recs  -trend h/h - stable  -continue hydration and monitor urine color  -patient will ultimately need close outpatient follow up/work up for UPJ obstruction  -Discussed with Dr. Aaron Dumont [facility]  [address] [place] [phone]

## 2024-01-18 NOTE — PROGRESS NOTE ADULT - ASSESSMENT
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    hx DVT  --patient w/ known history of R LE thrombosis and echogenic linear thrombus in L popliteal vain on b/l LE doppler performed 5/3/23. follow up study done 8/28/23 w/ noted chronic post thrombotic changes but no evidence of DVT  --LE duplex done inpatient on 1/10 w/ Thrombus in the bilateral soleal veins and right peroneal vein.  --outpatient hypercoagulable was negative  --patient previously treated w/ Eliquis but transitioned to Lovenox d/t therapy failure   --on Lovenox 100mg BID. May hold until repeat CT urogram to r/o underlying malignancy. Will need to resume lovenox prior to d/c.   --vascular surgery consulted, no surgical intervention recommended at this time    hematuria/UTI  --CT a/p w/  Mild left hydronephrosis with abrupt tapering at the ureteropelvic junction. There is evidence of hemorrhage into the dilated left upper renal pole collecting system and associated inflammatory changes throughout the perinephric fat. No radiodense calculi identified  --s/p ceftriaxone for UTI/ hydronephrosis   --renal and urology following  --IR consulted for possible embolization  --plan to repeat CT-urogram once urine clears    will follow,    Da Cain PA-C  Hematology/Oncology  [facility]  [phone] (office)

## 2024-01-19 LAB
ANION GAP SERPL CALC-SCNC: 12 MMOL/L — SIGNIFICANT CHANGE UP (ref 5–17)
BLD GP AB SCN SERPL QL: NEGATIVE — SIGNIFICANT CHANGE UP
BUN SERPL-MCNC: 12 MG/DL — SIGNIFICANT CHANGE UP (ref 7–23)
CALCIUM SERPL-MCNC: 8.5 MG/DL — SIGNIFICANT CHANGE UP (ref 8.4–10.5)
CHLORIDE SERPL-SCNC: 107 MMOL/L — SIGNIFICANT CHANGE UP (ref 96–108)
CO2 SERPL-SCNC: 23 MMOL/L — SIGNIFICANT CHANGE UP (ref 22–31)
CREAT SERPL-MCNC: 0.9 MG/DL — SIGNIFICANT CHANGE UP (ref 0.5–1.3)
EGFR: 102 ML/MIN/1.73M2 — SIGNIFICANT CHANGE UP
GLUCOSE SERPL-MCNC: 85 MG/DL — SIGNIFICANT CHANGE UP (ref 70–99)
HCT VFR BLD CALC: 34.4 % — LOW (ref 39–50)
HGB BLD-MCNC: 11.2 G/DL — LOW (ref 13–17)
INR BLD: 1.07 RATIO — SIGNIFICANT CHANGE UP (ref 0.85–1.18)
MCHC RBC-ENTMCNC: 29.6 PG — SIGNIFICANT CHANGE UP (ref 27–34)
MCHC RBC-ENTMCNC: 32.6 GM/DL — SIGNIFICANT CHANGE UP (ref 32–36)
MCV RBC AUTO: 90.8 FL — SIGNIFICANT CHANGE UP (ref 80–100)
NRBC # BLD: 0 /100 WBCS — SIGNIFICANT CHANGE UP (ref 0–0)
PLATELET # BLD AUTO: 189 K/UL — SIGNIFICANT CHANGE UP (ref 150–400)
POTASSIUM SERPL-MCNC: 3.6 MMOL/L — SIGNIFICANT CHANGE UP (ref 3.5–5.3)
POTASSIUM SERPL-SCNC: 3.6 MMOL/L — SIGNIFICANT CHANGE UP (ref 3.5–5.3)
PROTHROM AB SERPL-ACNC: 11.2 SEC — SIGNIFICANT CHANGE UP (ref 9.5–13)
RBC # BLD: 3.79 M/UL — LOW (ref 4.2–5.8)
RBC # FLD: 13.2 % — SIGNIFICANT CHANGE UP (ref 10.3–14.5)
RH IG SCN BLD-IMP: NEGATIVE — SIGNIFICANT CHANGE UP
SODIUM SERPL-SCNC: 142 MMOL/L — SIGNIFICANT CHANGE UP (ref 135–145)
WBC # BLD: 6.38 K/UL — SIGNIFICANT CHANGE UP (ref 3.8–10.5)
WBC # FLD AUTO: 6.38 K/UL — SIGNIFICANT CHANGE UP (ref 3.8–10.5)

## 2024-01-19 PROCEDURE — 93970 EXTREMITY STUDY: CPT | Mod: 26

## 2024-01-19 PROCEDURE — 36251 INS CATH REN ART 1ST UNILAT: CPT | Mod: LT

## 2024-01-19 PROCEDURE — 99231 SBSQ HOSP IP/OBS SF/LOW 25: CPT | Mod: GC

## 2024-01-19 RX ADMIN — SODIUM CHLORIDE 50 MILLILITER(S): 9 INJECTION INTRAMUSCULAR; INTRAVENOUS; SUBCUTANEOUS at 21:08

## 2024-01-19 RX ADMIN — LACOSAMIDE 150 MILLIGRAM(S): 50 TABLET ORAL at 06:04

## 2024-01-19 RX ADMIN — ATORVASTATIN CALCIUM 20 MILLIGRAM(S): 80 TABLET, FILM COATED ORAL at 21:07

## 2024-01-19 RX ADMIN — PANTOPRAZOLE SODIUM 40 MILLIGRAM(S): 20 TABLET, DELAYED RELEASE ORAL at 06:04

## 2024-01-19 RX ADMIN — Medication 1 TABLET(S): at 18:16

## 2024-01-19 RX ADMIN — LACOSAMIDE 150 MILLIGRAM(S): 50 TABLET ORAL at 18:19

## 2024-01-19 RX ADMIN — TAMSULOSIN HYDROCHLORIDE 0.4 MILLIGRAM(S): 0.4 CAPSULE ORAL at 21:07

## 2024-01-19 RX ADMIN — Medication 81 MILLIGRAM(S): at 18:16

## 2024-01-19 RX ADMIN — GABAPENTIN 300 MILLIGRAM(S): 400 CAPSULE ORAL at 18:19

## 2024-01-19 RX ADMIN — GABAPENTIN 300 MILLIGRAM(S): 400 CAPSULE ORAL at 06:04

## 2024-01-19 NOTE — CONSULT NOTE ADULT - SUBJECTIVE AND OBJECTIVE BOX
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 Patient reports that when his hematuria began, he attributed it to increase intake of cranberry juice. Patient states that when his hematuria did not subside, he presented to his outpatient Urologist's office for which he was started on Cipro for UTI and only took one dose. Patient also endorses left flank pain. Patient denies history of nephrolithiasis or previous episodes of hematuria. Patient denies dysuria, frequency or urgency, chills, body aches, nausea, vomiting, headache, lightheadedness or dizziness.               (10 Gerardo 2024 11:20)      PAST MEDICAL & SURGICAL HISTORY:  History of TIAs      CVA (cerebrovascular accident)      HLD (hyperlipidemia)      Vertigo      Unresponsiveness      History of fasciotomy          MEDICATIONS  (STANDING):  aspirin enteric coated 81 milliGRAM(s) Oral daily  atorvastatin 20 milliGRAM(s) Oral at bedtime  enoxaparin Injectable 100 milliGRAM(s) SubCutaneous every 12 hours  gabapentin 300 milliGRAM(s) Oral two times a day  lacosamide 150 milliGRAM(s) Oral two times a day  multivitamin 1 Tablet(s) Oral daily  pantoprazole    Tablet 40 milliGRAM(s) Oral before breakfast  sodium chloride 0.9%. 1000 milliLiter(s) (50 mL/Hr) IV Continuous <Continuous>  tamsulosin 0.4 milliGRAM(s) Oral at bedtime    MEDICATIONS  (PRN):      Allergies    No Known Allergies    Intolerances        VITALS:    Vital Signs Last 24 Hrs  T(C): 36.3 (21 Jan 2024 12:16), Max: 36.5 (20 Jan 2024 21:12)  T(F): 97.4 (21 Jan 2024 12:16), Max: 97.7 (20 Jan 2024 21:12)  HR: 67 (21 Jan 2024 12:16) (58 - 67)  BP: 103/63 (21 Jan 2024 12:16) (100/61 - 116/73)  BP(mean): --  RR: 18 (21 Jan 2024 12:16) (18 - 18)  SpO2: 97% (21 Jan 2024 12:16) (97% - 97%)    Parameters below as of 21 Jan 2024 12:16  Patient On (Oxygen Delivery Method): room air        LABS:                          11.3   6.13  )-----------( 176      ( 21 Jan 2024 07:42 )             34.3               CAPILLARY BLOOD GLUCOSE              LOWER EXTREMITY PHYSICAL EXAM:    Vascular: DP/PT 2/4, B/L, CFT <3seconds B/L, Temperature gradient WNL, B/L.   Neuro: Epicritic sensation diminished to the level of dorsal foot left side otherwise intact, B/L.  Musculoskeletal/Ortho: 5/5 muscle strength with some diminished on L>R, unable to clear 90 degrees left ankle versus right  Skin: no open lesions no signs of infection      RADIOLOGY & ADDITIONAL STUDIES:

## 2024-01-19 NOTE — PROGRESS NOTE ADULT - SUBJECTIVE AND OBJECTIVE BOX
Patient is a 53y old  Male who presents with a chief complaint of Hematuria (19 Jan 2024 11:09)    Patient seen and examined at bedside. No new complaints.     MEDICATIONS  (STANDING):  aspirin enteric coated 81 milliGRAM(s) Oral daily  atorvastatin 20 milliGRAM(s) Oral at bedtime  gabapentin 300 milliGRAM(s) Oral two times a day  lacosamide 150 milliGRAM(s) Oral two times a day  multivitamin 1 Tablet(s) Oral daily  pantoprazole    Tablet 40 milliGRAM(s) Oral before breakfast  sodium chloride 0.9%. 1000 milliLiter(s) (50 mL/Hr) IV Continuous <Continuous>  tamsulosin 0.4 milliGRAM(s) Oral at bedtime    MEDICATIONS  (PRN):    Vital Signs Last 24 Hrs  T(C): 36.1 (19 Jan 2024 13:48), Max: 36.6 (19 Jan 2024 00:41)  T(F): 97 (19 Jan 2024 13:48), Max: 97.8 (19 Jan 2024 00:41)  HR: 91 (19 Jan 2024 13:48) (58 - 91)  BP: 124/81 (19 Jan 2024 13:48) (100/62 - 124/81)  BP(mean): --  RR: 20 (19 Jan 2024 13:48) (18 - 20)  SpO2: 100% (19 Jan 2024 13:48) (95% - 100%)    Parameters below as of 19 Jan 2024 10:59  Patient On (Oxygen Delivery Method): room air    PE  NAD  Awake, alert  Anicteric, MMM  No c/c/e  No rash grossly  FROM                        11.2   6.38  )-----------( 189      ( 19 Jan 2024 06:39 )             34.4       01-19    142  |  107  |  12  ----------------------------<  85  3.6   |  23  |  0.90    Ca    8.5      19 Jan 2024 06:39  Phos  3.7     01-18  Mg     2.1     01-18

## 2024-01-19 NOTE — CONSULT NOTE ADULT - ASSESSMENT
52yo male with left foot/ankle pain, weakness, gait abnormality  ·	Patient was seen and evaluated  ·	Currently patient has undergone about 1 year of PT and still with contracture to the left ankle, discussed the possibility of posterior muscle group lengthening as a concomitant treatment to allow better/increased ROM.  ·	No signs of infection at the foot and ankle level currently   ·	Will follow

## 2024-01-19 NOTE — CONSULT NOTE ADULT - CONSULT REQUESTED DATE/TIME
11-Jan-2024 14:44
21-Jan-2024 13:09
10-Gerardo-2024
10-Gerardo-2024 04:53
10-Gerardo-2024 17:52
10-Gerardo-2024 10:37
15-Gerardo-2024 13:15

## 2024-01-19 NOTE — PRE PROCEDURE NOTE - PRE PROCEDURE EVALUATION
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 Recurrent hematuria with CT showing blood products.        Allergies: No Known Allergies    Medications (Abx/Cardiac/Anticoagulation/Blood Products)  aspirin enteric coated: 81 milliGRAM(s) Oral (01-18 @ 11:13)  enoxaparin Injectable: 100 milliGRAM(s) SubCutaneous (01-18 @ 06:48)    Data:    T(C): 36.1  HR: 91  BP: 124/81  RR: 20  SpO2: 100%    Exam  General: No acute distress  Chest: Non labored breathing    -WBC 6.38 / HgB 11.2 / Hct 34.4 / Plt 189  -Na 142 / Cl 107 / BUN 12 / Glucose 85  -K 3.6 / CO2 23 / Cr 0.90  -INR1.07    Imaging: Reviewed    Plan: 53y Male presents for renal angiogram, possible embolization.   -Risks/Benefits/alternatives explained with the patient and/or healthcare proxy and witnessed informed consent obtained.

## 2024-01-19 NOTE — PROGRESS NOTE ADULT - ASSESSMENT
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  Saw his urologist  was started on an antibioti. Patient also reports several episodes of severe intermittent left flank pain today, denies history of kidney stones to his knowledge.  Denies associated nausea, vomiting, diarrhea, dysuria, fever, chill  LDL 60  LE duplex with + B/L below knee DVT   outpatient hypercoag workup in past neg as part of stroke workup   1/19 plan for IR procedure today. still with hematuria     Impression:   1) chronic strokes, resid WFD  2) seizure d/o 2/2 storkes  3) ADHD  4) vertigo BPPV    - f/u IR for embolizaiton plan for today 1/19   - + hematuria again  .  f/u  . f/u H/h    -  AC when restarted ; lovenox restarted but now BID   - for ADHD gets Vyvanse 50mg daily  - for seizure he is on vimpat should now be 150mg BID  and was taken off keppra   - f/u heme/onc, new dvt was on lovenox ; now changed to BID dosing   - for secondary stroke prevention he is on asa 81mg daily and full dose lovenox .  - statin therapy with LDL goal < 70mg/dL; atorvastatin 20 at home   - f/u  for flank pain, hematuria and obstruction   - abx for UTI   - PT/OT   - meclizion prn for vertigo   - check FS, glucose control <180  - GI/DVT    - Thank you for allowing me to participate in the care of this patient. Call with questions.   known to me from office   Braxton Forde MD  Vascular Neurology  Office: [phone]

## 2024-01-19 NOTE — PROCEDURE NOTE - PROCEDURE FINDINGS AND DETAILS
Left renal angiogram without evidence of bleeding. Left radial artery access. Left renal angiogram with interrogation of the left renal artery and accessory left renal artery without evidence of active bleeding or pseudoaneurysm. Left wrist hemostasis achieved with TR band.

## 2024-01-19 NOTE — PROGRESS NOTE ADULT - SUBJECTIVE AND OBJECTIVE BOX
Subjective  Patient seen and examined. Resting comfortably.     Objective    Vital signs  T(F): , Max: 97.8 (01-19-24 @ 00:41)  HR: 61 (01-19-24 @ 04:30)  BP: 100/62 (01-19-24 @ 04:30)  SpO2: 95% (01-19-24 @ 04:30)  Wt(kg): --    Output     OUT:    Voided (mL): 2400 mL  Total OUT: 2400 mL    Total NET: -2400 mL      Gen: NAD  Abd: soft, nontender, nondistended  : Voiding dark red urine    Labs      01-19 @ 06:39    WBC 6.38  / Hct 34.4  / SCr --       01-18 @ 07:02    WBC --    / Hct --    / SCr 0.92           Urine Cx: ?  Blood Cx: ?    Imaging

## 2024-01-19 NOTE — PROGRESS NOTE ADULT - ATTENDING COMMENTS
This is a chronic condition, uncontrolled  On his med list he has amlodipine listed but does tell me he is not taking this anymore, he stopped taking when he ran out of the prescription  He is currently only taking lisinopril 40 mg daily, blood pressure extremely elevated 176/110 but he is asymptomatic of this  He was advised to continue lisinopril but will have him restart the amlodipine 10 mg daily  He is going to return in 2 to 3 days for blood pressure check   Agree with assessment and plan.  IR evaluation/aniography

## 2024-01-19 NOTE — PROGRESS NOTE ADULT - ASSESSMENT
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 CT abd pelvis showed mild L hydro to the level of the UPJ with associated blood products in the collecting system. Labs showed WBC 13.60, Hct 42.5, Cr 1.14. UA pending. PVR 55cc.    Recs:  -Hold AC and see if urine color clears. F/u heme/onc  -trend h/h - stable  -continue hydration and monitor urine color  -IR now planning for L renal angio today   -patient will ultimately need close outpatient follow up/work up for UPJ obstruction  -Discussed with Dr. Aaron Dumont [place] for [facility]  [address] [place] [address] [phone]

## 2024-01-19 NOTE — PROGRESS NOTE ADULT - SUBJECTIVE AND OBJECTIVE BOX
DATE OF SERVICE: 01-19-24 @ 11:09    Patient is a 53y old  Male who presents with a chief complaint of Hematuria (19 Jan 2024 07:23)      SUBJECTIVE / OVERNIGHT EVENTS:  No chest pain. No shortness of breath. No new complaints. No events overnight.     MEDICATIONS  (STANDING):  aspirin enteric coated 81 milliGRAM(s) Oral daily  atorvastatin 20 milliGRAM(s) Oral at bedtime  gabapentin 300 milliGRAM(s) Oral two times a day  lacosamide 150 milliGRAM(s) Oral two times a day  multivitamin 1 Tablet(s) Oral daily  pantoprazole    Tablet 40 milliGRAM(s) Oral before breakfast  sodium chloride 0.9%. 1000 milliLiter(s) (50 mL/Hr) IV Continuous <Continuous>  tamsulosin 0.4 milliGRAM(s) Oral at bedtime    MEDICATIONS  (PRN):      Vital Signs Last 24 Hrs  T(C): 36.6 (19 Jan 2024 10:59), Max: 36.6 (19 Jan 2024 00:41)  T(F): 97.8 (19 Jan 2024 10:59), Max: 97.8 (19 Jan 2024 00:41)  HR: 58 (19 Jan 2024 10:59) (58 - 68)  BP: 105/70 (19 Jan 2024 10:59) (100/62 - 110/53)  BP(mean): --  RR: 18 (19 Jan 2024 10:59) (18 - 18)  SpO2: 98% (19 Jan 2024 10:59) (95% - 98%)    Parameters below as of 19 Jan 2024 10:59  Patient On (Oxygen Delivery Method): room air      CAPILLARY BLOOD GLUCOSE        I&O's Summary    18 Jan 2024 07:01  -  19 Jan 2024 07:00  --------------------------------------------------------  IN: 1800 mL / OUT: 2401 mL / NET: -601 mL        PHYSICAL EXAM:  GENERAL: NAD, well-developed  HEAD:  Atraumatic, Normocephalic  EYES: EOMI, PERRLA, conjunctiva and sclera clear  NECK: Supple, No JVD  CHEST/LUNG: Clear to auscultation bilaterally; No wheeze  HEART: Regular rate and rhythm; No murmurs, rubs, or gallops  ABDOMEN: Soft, Nontender, Nondistended; Bowel sounds present  EXTREMITIES:  2+ Peripheral Pulses, No clubbing, cyanosis, or edema  PSYCH: AAOx3  NEUROLOGY: non-focal  SKIN: No rashes or lesions    LABS:                        11.2   6.38  )-----------( 189      ( 19 Jan 2024 06:39 )             34.4     01-19    142  |  107  |  12  ----------------------------<  85  3.6   |  23  |  0.90    Ca    8.5      19 Jan 2024 06:39  Phos  3.7     01-18  Mg     2.1     01-18      PT/INR - ( 19 Jan 2024 06:39 )   PT: 11.2 sec;   INR: 1.07 ratio               Urinalysis Basic - ( 19 Jan 2024 06:39 )    Color: x / Appearance: x / SG: x / pH: x  Gluc: 85 mg/dL / Ketone: x  / Bili: x / Urobili: x   Blood: x / Protein: x / Nitrite: x   Leuk Esterase: x / RBC: x / WBC x   Sq Epi: x / Non Sq Epi: x / Bacteria: x        RADIOLOGY & ADDITIONAL TESTS:    Imaging Personally Reviewed:    Consultant(s) Notes Reviewed:      Care Discussed with Consultants/Other Providers:

## 2024-01-19 NOTE — PROGRESS NOTE ADULT - ASSESSMENT
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 Patient reports that when his hematuria began, he attributed it to increase intake of cranberry juice. Patient states that when his hematuria did not subside, he presented to his outpatient Urologist's office for which he was started on Cipro for UTI and only took one dose. Patient also endorses left flank pain. Patient denies history of nephrolithiasis or previous episodes of hematuria. Patient denies dysuria, frequency or urgency, chills, body aches, nausea, vomiting, headache, lightheadedness or dizziness.       Hematuria   - Pt with waxing and waning gross hematuria; CT w/ hemorrhage    - Check CBC - Monitor H/H and Hgb closely --> Mild drop in Hgb, overall stable   - Was resumed on full dose AC. With recurrent hematuria. Urology follow up appreciated   - CT Urogram as noted, ? neoplasm; Urology and IR follow up appreciated --> Plan for repeat CT urogram once hematuria resolves to R/O underlying neoplasm. Patient on full dose AC -- In discussion with IR at this time regarding AC. --> Plan to hold Lovenox 1/18 PM and 1/19 AM. Resume once cleared from IR post embolization.   - Rack urine to assess color output  - Check PVR   - IR eval for possible embolization appreciated; F/u recs --> pending repeat CT once hematuria resolves   - Urology, IR, Heme/Onc evaluations appreciated; F/u recs    UTI   - With associated flank pain - now resolved. UA grossly positive.    - S/P course of Rocephin. UCx --> negative   - Hydration as tolerated   - Monitor CBC, Temp Curve, VS and patient closely     Hydronephrosis   - CT w/ mild L hydronephrosis with abrupt tapering of the dilated renal collecting system at the level of the UPJ, Hyperdense material within the dilated L upper renal pole most likely representing hemorrhage, Moderate L Perinephric/ periureteric stranding, no calculi identified   - C/w Tamsulosin; Monitor I and O   - As per Urology, patient will require close outpatient follow up and work up for UPJ obstruction   - Urology eval appreciated; F/u recs    Hemorrhage   - Seen on CT as above  - Duplex with possible soleal muscle hematoma ? -- Monitor H/H --> Repeat Duplex in 1 week --> ordered for 1/19   - Check serial CBC; Monitor H/H closely. Transfuse for Hgb < 7.0   - Urology eval appreciated; F/u recs    DVT  - LE Duplex w/ B/L soleal vein and right peroneal vein thrombus, Acute DVT below the knee    - Was on Lovenox for  Mg Qd. 100 BID resumed, Monitor   - Vascular Cardiology eval appreciated; F/u recs --> Duplex 1/12 with resolution of DVT, Check Duplex 1/19   - Hematology eval appreciated; F/u recs  --> Patient will require full dose AC on DC per Heme/Onc     Seizure   - Per history   - C/w Vimpat 150 BID, off of Keppra as per Neuro.   - Seizure precautions  - Neuro eval appreciated; F/u recs    CVA  - Per History   - On ASA, Lovenox and Statin  - Neuro eval appreciated     ADHD  - Vyvanse 50 Mg not available in patient. Resume on DC  - Fall, Seizure, Aspiration precautions  - Neuro eval appreciated; F/u recs     LLE Fasciotomy   - 2/2 compartment syndrome.   - Vascular surgery eval appreciated; F/u recs --> No acute intervention at this time     HLD  - Lipid panel noted; C/w Lipitor 20    Vertigo  - Per History. No longer on Meclizine per patient  - Fall, Seizure and Aspiration precautions   - Ambulate with Assistance  - PT eval     PPX  - Lovenox   - PPI       Discussed with Attending and ACP.       Dr. Ma will be covering from Friday 01/19 until Thursday 01/25.  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 Patient reports that when his hematuria began, he attributed it to increase intake of cranberry juice. Patient states that when his hematuria did not subside, he presented to his outpatient Urologist's office for which he was started on Cipro for UTI and only took one dose. Patient also endorses left flank pain. Patient denies history of nephrolithiasis or previous episodes of hematuria. Patient denies dysuria, frequency or urgency, chills, body aches, nausea, vomiting, headache, lightheadedness or dizziness.       Hematuria   - Pt with waxing and waning gross hematuria; CT w/ hemorrhage    - Check CBC - Monitor H/H and Hgb closely --> Mild drop in Hgb, overall stable   - Was resumed on full dose AC. With recurrent hematuria. Urology follow up appreciated   - CT Urogram as noted, ? neoplasm; Urology and IR follow up appreciated --> Plan for repeat CT urogram once hematuria resolves to R/O underlying neoplasm. Patient on full dose AC -- In discussion with IR at this time regarding AC. --> Plan to hold Lovenox 1/18 PM and 1/19 AM. Resume once cleared from IR post embolization.   - Rack urine to assess color output  - Check PVR   - IR eval for possible embolization appreciated; F/u recs --> pending repeat CT once hematuria resolves   - Urology, IR, Heme/Onc evaluations appreciated; F/u recs    UTI   - With associated flank pain - now resolved. UA grossly positive.    - S/P course of Rocephin. UCx --> negative   - Hydration as tolerated   - Monitor CBC, Temp Curve, VS and patient closely     Hydronephrosis   - CT w/ mild L hydronephrosis with abrupt tapering of the dilated renal collecting system at the level of the UPJ, Hyperdense material within the dilated L upper renal pole most likely representing hemorrhage, Moderate L Perinephric/ periureteric stranding, no calculi identified   - C/w Tamsulosin; Monitor I and O   - As per Urology, patient will require close outpatient follow up and work up for UPJ obstruction   - Urology eval appreciated; F/u recs    Hemorrhage   - Seen on CT as above  - Duplex with possible soleal muscle hematoma ? -- Monitor H/H --> Repeat Duplex in 1 week --> ordered for 1/19   - Check serial CBC; Monitor H/H closely. Transfuse for Hgb < 7.0   - Urology eval appreciated; F/u recs    DVT  - LE Duplex w/ B/L soleal vein and right peroneal vein thrombus, Acute DVT below the knee    - Was on Lovenox for  Mg Qd. 100 BID resumed, Monitor   - Vascular Cardiology eval appreciated; F/u recs --> Duplex 1/12 with resolution of DVT, Check Duplex 1/19   - Hematology eval appreciated; F/u recs  --> Patient will require full dose AC on DC per Heme/Onc     Seizure   - Per history   - C/w Vimpat 150 BID, off of Keppra as per Neuro.   - Seizure precautions  - Neuro eval appreciated; F/u recs    CVA  - Per History   - On ASA, Lovenox and Statin  - Neuro eval appreciated     ADHD  - Vyvanse 50 Mg not available in patient. Resume on DC  - Fall, Seizure, Aspiration precautions  - Neuro eval appreciated; F/u recs     LLE Fasciotomy   - 2/2 compartment syndrome.   - Vascular surgery eval appreciated; F/u recs --> No acute intervention at this time     HLD  - Lipid panel noted; C/w Lipitor 20    Vertigo  - Per History. No longer on Meclizine per patient  - Fall, Seizure and Aspiration precautions   - Ambulate with Assistance  - PT eval     PPX  - Lovenox   - PPI

## 2024-01-19 NOTE — PROGRESS NOTE ADULT - ASSESSMENT
53y Male PMH CVA with residual word finding difficulty, bilateral DVTs on chronic Lovenox SQ and ASA81 (last dose yesterday), compartment syndrome status post left fasciotomy, HLD, PFO, vertigo, seizure disorder who presents to [facility] ED on 1/9/2024 for gross hematuria and intermittent suprapubic and L flank pain.    hx DVT  --patient w/ known history of R LE thrombosis and echogenic linear thrombus in L popliteal vain on b/l LE doppler performed 5/3/23. follow up study done 8/28/23 w/ noted chronic post thrombotic changes but no evidence of DVT  --LE duplex done inpatient on 1/10 w/ Thrombus in the bilateral soleal veins and right peroneal vein. Reportedly reviewed by vascular, felt to be more likely representative of chronic clot  --outpatient hypercoagulable was negative  --patient previously treated w/ Eliquis but transitioned to Lovenox d/t therapy failure   --on Lovenox 100mg BID. May hold until repeat CT urogram to r/o underlying malignancy. On aspirin.  --vascular surgery consulted, no surgical intervention recommended at this time    hematuria/UTI  --CT a/p w/  Mild left hydronephrosis with abrupt tapering at the ureteropelvic junction. There is evidence of hemorrhage into the dilated left upper renal pole collecting system and associated inflammatory changes throughout the perinephric fat. No radiodense calculi identified  --s/p ceftriaxone for UTI/ hydronephrosis   --renal and urology following  --Going for left renal angio/embo today, 1/19  --plan to repeat CT-urogram once urine clears  --Would recommend inpatient work-up for hematuria as the patient needs to be on lovenox from neurologic perspective and hematologic perspective - he has had multiple strokes and DVT's without identifiable hypercoagulable etiology. High risk for recurrent clots.     will follow,    Da Cain PA-C  Hematology/Oncology  [facility]  [phone] (office)

## 2024-01-19 NOTE — CONSULT NOTE ADULT - CONSULT REASON
History of DVT
left ankle contracture/gait abnromality
Bilateral below knee DVTs
gross hematuria
stroke  seizure
Left renal angiogram
hx DVT, TIA

## 2024-01-19 NOTE — PROGRESS NOTE ADULT - SUBJECTIVE AND OBJECTIVE BOX
Neurology        S: patient seen, resting in bed  + hematuria ; IR procedure today           Medications: MEDICATIONS  (STANDING):  aspirin enteric coated 81 milliGRAM(s) Oral daily  atorvastatin 20 milliGRAM(s) Oral at bedtime  gabapentin 300 milliGRAM(s) Oral two times a day  lacosamide 150 milliGRAM(s) Oral two times a day  multivitamin 1 Tablet(s) Oral daily  pantoprazole    Tablet 40 milliGRAM(s) Oral before breakfast  sodium chloride 0.9%. 1000 milliLiter(s) (50 mL/Hr) IV Continuous <Continuous>  tamsulosin 0.4 milliGRAM(s) Oral at bedtime    MEDICATIONS  (PRN):       Vitals:  Vital Signs Last 24 Hrs  T(C): 36.6 (19 Jan 2024 10:59), Max: 36.6 (19 Jan 2024 00:41)  T(F): 97.8 (19 Jan 2024 10:59), Max: 97.8 (19 Jan 2024 00:41)  HR: 58 (19 Jan 2024 10:59) (58 - 68)  BP: 105/70 (19 Jan 2024 10:59) (100/62 - 110/53)  BP(mean): --  RR: 18 (19 Jan 2024 10:59) (18 - 18)  SpO2: 98% (19 Jan 2024 10:59) (95% - 98%)    Parameters below as of 19 Jan 2024 10:59  Patient On (Oxygen Delivery Method): room air           General Exam:   General Appearance: Appropriately dressed and in no acute distress       Head: Normocephalic, atraumatic and no dysmorphic features  Ear, Nose, and Throat: Moist mucous membranes  CVS: S1S2+  Resp: No SOB, no wheeze or rhonchi  GI: soft NT/ND  Extremities: No edema or cyanosis  Skin: No bruises or rashes     Neurological Exam:  MS: Eyes open. Awake, alert, oriented to person, place, situation, time. Follows all commands. Attention/concentration, recent and remote memory, and fund of knowledge intact  Language: Speech is clear, fluent with good repetition & comprehension.  CNs: PERRL (R = 3mm, L = 3mm). VFF. EOMI No nystagmus.  No provocation on dizziness with head turning. V1-3 intact to LT b/l. No facial asymmetry b/l, full eye closure strength b/l. Hearing grossly normal (rubbing fingers) b/l. Tongue midline, normal movements, no atrophy. Palate with symmetric elevation. Trap 5/5 b/l  Motor: Normal muscle bulk & tone. No noticeable tremor. No pronator drift. No drift of UE or LE b/l.               Deltoid	Biceps	Triceps	   R	         5	      5	   5	 5	  		 	  L	         5	      5	   5	 5	  		    	H-Flex	K-Flex	K-Ext	D-Flex	P-Flex  R	    5	  5	   5	  5	    5		   L	    5	   5	   5	  4	    5		   *Some pain limitation on LLE.   Sensation: Intact to LT b/l throughout.   Cortical: Extinction on DSS (neglect): none  Reflexes:              Biceps(C5)       BR(C6)     Triceps(C7)               Patellar(L4)    Achilles(S1)    Plantar Resp  R	              3	          3	             3		 3		    3	Withdrawal       L	              3	          3	             3		 2		    1	Withdrawal    Coordination: No dysmetria to FTN/HTS b/l.   Gait: Deferred.     Data/Labs/Imaging which I personally reviewed.        LABS:                          11.2   6.38  )-----------( 189      ( 19 Jan 2024 06:39 )             34.4     01-19    142  |  107  |  12  ----------------------------<  85  3.6   |  23  |  0.90    Ca    8.5      19 Jan 2024 06:39  Phos  3.7     01-18  Mg     2.1     01-18        PT/INR - ( 19 Jan 2024 06:39 )   PT: 11.2 sec;   INR: 1.07 ratio           Urinalysis Basic - ( 19 Jan 2024 06:39 )    Color: x / Appearance: x / SG: x / pH: x  Gluc: 85 mg/dL / Ketone: x  / Bili: x / Urobili: x   Blood: x / Protein: x / Nitrite: x   Leuk Esterase: x / RBC: x / WBC x   Sq Epi: x / Non Sq Epi: x / Bacteria: x

## 2024-01-20 LAB
HCT VFR BLD CALC: 33.5 % — LOW (ref 39–50)
HCT VFR BLD CALC: 35.6 % — LOW (ref 39–50)
HGB BLD-MCNC: 11 G/DL — LOW (ref 13–17)
HGB BLD-MCNC: 11.6 G/DL — LOW (ref 13–17)
MCHC RBC-ENTMCNC: 29.4 PG — SIGNIFICANT CHANGE UP (ref 27–34)
MCHC RBC-ENTMCNC: 29.9 PG — SIGNIFICANT CHANGE UP (ref 27–34)
MCHC RBC-ENTMCNC: 32.6 GM/DL — SIGNIFICANT CHANGE UP (ref 32–36)
MCHC RBC-ENTMCNC: 32.8 GM/DL — SIGNIFICANT CHANGE UP (ref 32–36)
MCV RBC AUTO: 90.4 FL — SIGNIFICANT CHANGE UP (ref 80–100)
MCV RBC AUTO: 91 FL — SIGNIFICANT CHANGE UP (ref 80–100)
NRBC # BLD: 0 /100 WBCS — SIGNIFICANT CHANGE UP (ref 0–0)
NRBC # BLD: 0 /100 WBCS — SIGNIFICANT CHANGE UP (ref 0–0)
PLATELET # BLD AUTO: 173 K/UL — SIGNIFICANT CHANGE UP (ref 150–400)
PLATELET # BLD AUTO: 189 K/UL — SIGNIFICANT CHANGE UP (ref 150–400)
RBC # BLD: 3.68 M/UL — LOW (ref 4.2–5.8)
RBC # BLD: 3.94 M/UL — LOW (ref 4.2–5.8)
RBC # FLD: 13 % — SIGNIFICANT CHANGE UP (ref 10.3–14.5)
RBC # FLD: 13.1 % — SIGNIFICANT CHANGE UP (ref 10.3–14.5)
WBC # BLD: 7.19 K/UL — SIGNIFICANT CHANGE UP (ref 3.8–10.5)
WBC # BLD: 7.29 K/UL — SIGNIFICANT CHANGE UP (ref 3.8–10.5)
WBC # FLD AUTO: 7.19 K/UL — SIGNIFICANT CHANGE UP (ref 3.8–10.5)
WBC # FLD AUTO: 7.29 K/UL — SIGNIFICANT CHANGE UP (ref 3.8–10.5)

## 2024-01-20 RX ORDER — ENOXAPARIN SODIUM 100 MG/ML
100 INJECTION SUBCUTANEOUS EVERY 12 HOURS
Refills: 0 | Status: DISCONTINUED | OUTPATIENT
Start: 2024-01-20 | End: 2024-01-22

## 2024-01-20 RX ADMIN — GABAPENTIN 300 MILLIGRAM(S): 400 CAPSULE ORAL at 17:15

## 2024-01-20 RX ADMIN — ATORVASTATIN CALCIUM 20 MILLIGRAM(S): 80 TABLET, FILM COATED ORAL at 21:15

## 2024-01-20 RX ADMIN — ENOXAPARIN SODIUM 100 MILLIGRAM(S): 100 INJECTION SUBCUTANEOUS at 17:15

## 2024-01-20 RX ADMIN — Medication 81 MILLIGRAM(S): at 11:20

## 2024-01-20 RX ADMIN — SODIUM CHLORIDE 50 MILLILITER(S): 9 INJECTION INTRAMUSCULAR; INTRAVENOUS; SUBCUTANEOUS at 05:27

## 2024-01-20 RX ADMIN — PANTOPRAZOLE SODIUM 40 MILLIGRAM(S): 20 TABLET, DELAYED RELEASE ORAL at 05:26

## 2024-01-20 RX ADMIN — SODIUM CHLORIDE 50 MILLILITER(S): 9 INJECTION INTRAMUSCULAR; INTRAVENOUS; SUBCUTANEOUS at 21:15

## 2024-01-20 RX ADMIN — GABAPENTIN 300 MILLIGRAM(S): 400 CAPSULE ORAL at 05:26

## 2024-01-20 RX ADMIN — LACOSAMIDE 150 MILLIGRAM(S): 50 TABLET ORAL at 05:26

## 2024-01-20 RX ADMIN — Medication 1 TABLET(S): at 11:21

## 2024-01-20 RX ADMIN — TAMSULOSIN HYDROCHLORIDE 0.4 MILLIGRAM(S): 0.4 CAPSULE ORAL at 21:15

## 2024-01-20 RX ADMIN — LACOSAMIDE 150 MILLIGRAM(S): 50 TABLET ORAL at 17:15

## 2024-01-20 NOTE — PROGRESS NOTE ADULT - SUBJECTIVE AND OBJECTIVE BOX
DATE OF SERVICE: 01-20-24 @ 14:46    Patient is a 53y old  Male who presents with a chief complaint of Hematuria (20 Jan 2024 12:58)      SUBJECTIVE / OVERNIGHT EVENTS:  No chest pain. No shortness of breath. No complaints. No events overnight. urine is clear    MEDICATIONS  (STANDING):  aspirin enteric coated 81 milliGRAM(s) Oral daily  atorvastatin 20 milliGRAM(s) Oral at bedtime  enoxaparin Injectable 100 milliGRAM(s) SubCutaneous every 12 hours  gabapentin 300 milliGRAM(s) Oral two times a day  lacosamide 150 milliGRAM(s) Oral two times a day  multivitamin 1 Tablet(s) Oral daily  pantoprazole    Tablet 40 milliGRAM(s) Oral before breakfast  sodium chloride 0.9%. 1000 milliLiter(s) (50 mL/Hr) IV Continuous <Continuous>  tamsulosin 0.4 milliGRAM(s) Oral at bedtime    MEDICATIONS  (PRN):      Vital Signs Last 24 Hrs  T(C): 36.4 (20 Jan 2024 12:18), Max: 36.6 (19 Jan 2024 20:31)  T(F): 97.5 (20 Jan 2024 12:18), Max: 97.9 (20 Jan 2024 04:50)  HR: 56 (20 Jan 2024 12:18) (52 - 72)  BP: 101/65 (20 Jan 2024 12:18) (92/77 - 105/77)  BP(mean): 86 (19 Jan 2024 17:00) (78 - 87)  RR: 18 (20 Jan 2024 12:18) (14 - 19)  SpO2: 96% (20 Jan 2024 12:18) (96% - 100%)    Parameters below as of 20 Jan 2024 12:18  Patient On (Oxygen Delivery Method): room air      CAPILLARY BLOOD GLUCOSE        I&O's Summary    19 Jan 2024 07:01  -  20 Jan 2024 07:00  --------------------------------------------------------  IN: 360 mL / OUT: 900 mL / NET: -540 mL    20 Jan 2024 07:01  -  20 Jan 2024 14:46  --------------------------------------------------------  IN: 600 mL / OUT: 1400 mL / NET: -800 mL        PHYSICAL EXAM:  GENERAL: NAD, well-developed  HEAD:  Atraumatic, Normocephalic  EYES: EOMI, PERRLA, conjunctiva and sclera clear  NECK: Supple, No JVD  CHEST/LUNG: Clear to auscultation bilaterally; No wheeze  HEART: Regular rate and rhythm; No murmurs, rubs, or gallops  ABDOMEN: Soft, Nontender, Nondistended; Bowel sounds present  EXTREMITIES:  2+ Peripheral Pulses, No clubbing, cyanosis, or edema  PSYCH: AAOx3  NEUROLOGY: non-focal  SKIN: No rashes or lesions    LABS:                        11.0   7.29  )-----------( 173      ( 20 Jan 2024 06:53 )             33.5     01-19    142  |  107  |  12  ----------------------------<  85  3.6   |  23  |  0.90    Ca    8.5      19 Jan 2024 06:39      PT/INR - ( 19 Jan 2024 06:39 )   PT: 11.2 sec;   INR: 1.07 ratio               Urinalysis Basic - ( 19 Jan 2024 06:39 )    Color: x / Appearance: x / SG: x / pH: x  Gluc: 85 mg/dL / Ketone: x  / Bili: x / Urobili: x   Blood: x / Protein: x / Nitrite: x   Leuk Esterase: x / RBC: x / WBC x   Sq Epi: x / Non Sq Epi: x / Bacteria: x        RADIOLOGY & ADDITIONAL TESTS:    Imaging Personally Reviewed:    Consultant(s) Notes Reviewed:      Care Discussed with Consultants/Other Providers:

## 2024-01-20 NOTE — PROGRESS NOTE ADULT - SUBJECTIVE AND OBJECTIVE BOX
Subjective  Patient seen and examined. Resting comfortably.     Objective    Vital signs  T(F): , Max: 97.9 (01-20-24 @ 04:50)  HR: 60 (01-20-24 @ 05:00)  BP: 101/63 (01-20-24 @ 05:00)  SpO2: 96% (01-20-24 @ 04:50)  Wt(kg): --    Output     OUT:    Voided (mL): 900 mL  Total OUT: 900 mL    Total NET: -900 mL    Gen: NAD  Abd: soft, nontender, nondistended  : Voiding yellow urine    Labs      01-20 @ 06:53    WBC 7.29  / Hct 33.5  / SCr --       01-19 @ 06:39    WBC 6.38  / Hct 34.4  / SCr 0.90           Urine Cx: ?  Blood Cx: ?    Imaging

## 2024-01-20 NOTE — PROGRESS NOTE ADULT - SUBJECTIVE AND OBJECTIVE BOX
Patient is a 53y old  Male who presents with a chief complaint of Hematuria (20 Jan 2024 11:24)    Patient seen and examined, reports urine has cleared and is now without bleeding.    MEDICATIONS  (STANDING):  aspirin enteric coated 81 milliGRAM(s) Oral daily  atorvastatin 20 milliGRAM(s) Oral at bedtime  enoxaparin Injectable 100 milliGRAM(s) SubCutaneous every 12 hours  gabapentin 300 milliGRAM(s) Oral two times a day  lacosamide 150 milliGRAM(s) Oral two times a day  multivitamin 1 Tablet(s) Oral daily  pantoprazole    Tablet 40 milliGRAM(s) Oral before breakfast  sodium chloride 0.9%. 1000 milliLiter(s) (50 mL/Hr) IV Continuous <Continuous>  tamsulosin 0.4 milliGRAM(s) Oral at bedtime    MEDICATIONS  (PRN):    Vital Signs Last 24 Hrs  T(C): 36.4 (20 Jan 2024 12:18), Max: 36.6 (19 Jan 2024 20:31)  T(F): 97.5 (20 Jan 2024 12:18), Max: 97.9 (20 Jan 2024 04:50)  HR: 56 (20 Jan 2024 12:18) (52 - 91)  BP: 101/65 (20 Jan 2024 12:18) (92/77 - 124/81)  BP(mean): 86 (19 Jan 2024 17:00) (78 - 87)  RR: 18 (20 Jan 2024 12:18) (14 - 20)  SpO2: 96% (20 Jan 2024 12:18) (96% - 100%)    Parameters below as of 20 Jan 2024 12:18  Patient On (Oxygen Delivery Method): room air    PE  NAD  Awake, alert  Anicteric, MMM  No c/c/e  No rash grossly  FROM                          11.0   7.29  )-----------( 173      ( 20 Jan 2024 06:53 )             33.5       01-19    142  |  107  |  12  ----------------------------<  85  3.6   |  23  |  0.90    Ca    8.5      19 Jan 2024 06:39

## 2024-01-20 NOTE — PROGRESS NOTE ADULT - ASSESSMENT
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  Saw his urologist  was started on an antibioti. Patient also reports several episodes of severe intermittent left flank pain today, denies history of kidney stones to his knowledge.  Denies associated nausea, vomiting, diarrhea, dysuria, fever, chill  LDL 60  LE duplex with + B/L below knee DVT   outpatient hypercoag workup in past neg as part of stroke workup   1/19 plan for IR procedure today. 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  - statin therapy with LDL goal < 70mg/dL; atorvastatin 20 at home   - f/u  for flank pain, hematuria and obstruction   - abx for UTI   - PT/OT   - meclizion prn for vertigo   - check FS, glucose control <180  - GI/DVT    - Thank you for allowing me to participate in the care of this patient. Call with questions.   known to me from office   Braxton Forde MD  Vascular Neurology  Office: [phone]

## 2024-01-20 NOTE — PROGRESS NOTE ADULT - ASSESSMENT
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 Patient reports that when his hematuria began, he attributed it to increase intake of cranberry juice. Patient states that when his hematuria did not subside, he presented to his outpatient Urologist's office for which he was started on Cipro for UTI and only took one dose. Patient also endorses left flank pain. Patient denies history of nephrolithiasis or previous episodes of hematuria. Patient denies dysuria, frequency or urgency, chills, body aches, nausea, vomiting, headache, lightheadedness or dizziness.       Hematuria   - Pt with waxing and waning gross hematuria; CT w/ hemorrhage    - Check CBC - Monitor H/H and Hgb closely --> Mild drop in Hgb, overall stable   - Was resumed on full dose AC. With recurrent hematuria. Urology follow up appreciated   - CT Urogram as noted, ? neoplasm; Urology and IR follow up appreciated --> Plan for repeat CT urogram once hematuria resolves to R/O underlying neoplasm. Patient on full dose AC -- In discussion with IR at this time regarding AC. --> Plan to hold Lovenox 1/18 PM and 1/19 AM.  - Rack urine to assess color output  - Check PVR   - s/p Left renal angiogram with interrogation of the left renal artery and accessory left renal artery without evidence of active bleeding or pseudoaneurysm.   - Urology, IR, Heme/Onc evaluations appreciated; F/u recs  - check CT urogram    UTI   - With associated flank pain - now resolved.   - S/P course of Rocephin. UCx --> negative   - Hydration as tolerated   - Monitor CBC, Temp Curve, VS and patient closely     Hydronephrosis   - CT w/ mild L hydronephrosis with abrupt tapering of the dilated renal collecting system at the level of the UPJ, Hyperdense material within the dilated L upper renal pole most likely representing hemorrhage, Moderate L Perinephric/ periureteric stranding, no calculi identified   - C/w Tamsulosin; Monitor I and O   - As per Urology, patient will require close outpatient follow up and work up for UPJ obstruction   - Urology eval appreciated; F/u recs    Hemorrhage   - Seen on CT as above  - Duplex with possible soleal muscle hematoma ? -- Monitor H/H --> Repeat Duplex in 1 week --> ordered for 1/19   - Check serial CBC; Monitor H/H closely. Transfuse for Hgb < 7.0   - Urology eval appreciated; F/u recs    DVT  - LE Duplex w/ B/L soleal vein and right peroneal vein thrombus, Acute DVT below the knee    - Was on Lovenox for  Mg Qd. 100 BID resumed, Monitor   - Vascular Cardiology eval appreciated; F/u recs --> Duplex 1/12 with resolution of DVT, Check Duplex 1/19   - Hematology eval appreciated; F/u recs  --> Patient will require full dose AC on DC per Heme/Onc     Seizure   - Per history   - C/w Vimpat 150 BID, off of Keppra as per Neuro.   - Seizure precautions  - Neuro eval appreciated; F/u recs    CVA  - Per History   - On ASA, Lovenox and Statin  - Neuro eval appreciated     ADHD  - Vyvanse 50 Mg not available in patient. Resume on DC  - Fall, Seizure, Aspiration precautions  - Neuro eval appreciated; F/u recs     LLE Fasciotomy   - 2/2 compartment syndrome.   - Vascular surgery eval appreciated; F/u recs --> No acute intervention at this time     HLD  - Lipid panel noted; C/w Lipitor 20    Vertigo  - Per History. No longer on Meclizine per patient  - Fall, Seizure and Aspiration precautions   - Ambulate with Assistance  - PT eval     PPX  - Lovenox   - PPI

## 2024-01-20 NOTE — PROGRESS NOTE ADULT - ASSESSMENT
53y Male PMH CVA with residual word finding difficulty, bilateral DVTs on chronic Lovenox SQ and ASA81 (last dose yesterday), compartment syndrome status post left fasciotomy, HLD, PFO, vertigo, seizure disorder who presents to [facility] ED on 1/9/2024 for gross hematuria and intermittent suprapubic and L flank pain. CT abd pelvis showed mild L hydro to the level of the UPJ with associated blood products in the collecting system. Labs showed WBC 13.60, Hct 42.5, Cr 1.14. UA pending. PVR 55cc.    Recs:  -Currently holding AC  -IR angio negative on 1/19  -trend h/h - stable  -continue hydration and monitor urine color  -F/u repeat CT urogram  -patient will ultimately need close outpatient follow up/work up for UPJ obstruction  -Discussed with Dr. Aaron Dumont [place] [facility]  [address] [place] [id number] [phone]

## 2024-01-20 NOTE — PROGRESS NOTE ADULT - ASSESSMENT
53y Male PMH CVA with residual word finding difficulty, bilateral DVTs on chronic Lovenox SQ and ASA81 (last dose yesterday), compartment syndrome status post left fasciotomy, HLD, PFO, vertigo, seizure disorder who presents to [facility] ED on 1/9/2024 for gross hematuria and intermittent suprapubic and L flank pain.    Hx DVT  --patient w/ known history of R LE thrombosis and echogenic linear thrombus in L popliteal vain on b/l LE doppler performed 5/3/23. follow up study done 8/28/23 w/ noted chronic post thrombotic changes but no evidence of DVT  --LE duplex done inpatient on 1/10 w/ Thrombus in the bilateral soleal veins and right peroneal vein. Reportedly reviewed by vascular, felt to be more likely representative of chronic clot  --outpatient hypercoagulable was negative  --patient previously treated w/ Eliquis but transitioned to Lovenox d/t therapy failure   --on Lovenox 100mg BID. May hold until repeat CT urogram to r/o underlying malignancy. On aspirin.  --vascular surgery consulted, no surgical intervention recommended at this time    hematuria/UTI  --CT a/p w/  Mild left hydronephrosis with abrupt tapering at the ureteropelvic junction. There is evidence of hemorrhage into the dilated left upper renal pole collecting system and associated inflammatory changes throughout the perinephric fat. No radiodense calculi identified  --s/p ceftriaxone for UTI/ hydronephrosis   --renal and urology following  --S/p left renal angio 1/19, without evidence of active bleeding or pseudoaneurysm.  --Follow up urology plan for repeat CT-urogram as hematuria resolved  --Will need to resume lovenox after hematuria w/u completed. He has had multiple strokes and DVT's without identifiable hypercoagulable etiology. High risk for recurrent clots. Once resumed can check anti-xa level 4 hrs after the third dose to assess if any room to decrease lovenox, however has been adequately dosed based on weight.    will follow,    Da Cain PA-C  Hematology/Oncology  [facility]  [phone] (office)

## 2024-01-20 NOTE — PROGRESS NOTE ADULT - NS ATTEND AMEND GEN_ALL_CORE FT
Agree with above assessment and plan
53M with history of multiple strokes and DVTs without identifiable hypercoaguable etiology on lovenox now with hematuria with CT evidence of mild left hydroureteronephrosis with abrupt tapering at the UPJ with evidence of hemorrhage into the dilated upper renal pole collecting system and associated inflammatory changed throughout the perinephric fat. s/p abx for UTI. Left renal angio with possible embolization today. Repeat CT urogram once urine clears.   Do to his high risk of thrombosis needs to be on anticoagulation- he failed eliquis and loenox is his optimal choice of ac. Once resumed can check anti-xa level 4 hrs after the third dose to assess if any room to decrease lovenox however has been adequately dosed based on weight.
52 y/o M with a history of multiple embolic infarcts c/b seizures, compartment syndrome LLE requiring fasciotomy, and PFO who follows with Dr. Varela in our practice for prior bilateral DVT's (5/2023). He is maintained outpatient on lovenox 1.5mg/kg/day, ?trouble injecting (has failed eliquis in the past). He presents to the ER for gross hematuria and intermittent suprapubic pain. LE duplex here showed DVT's in bilateral soleal veins and the right peroneal vein - reportedly reviewed by vascular, felt to be more likely representative of chronic clot. Last doppler US outpatient was 8/2023 and reported sequela of prior DVT in the right peroneal vein w/mural thickening, and no evidence of DVT in the LLE. CT shows UPJ obstruction with mild hydro, urology following. Would recommend inpatient work-up for hematuria as the patient needs to be on lovenox from neurologic perspective and hematologic perspective - he has had multiple strokes and DVT's without identifiable hypercoagulable etiology. High risk for recurrent clots. Changed to lovenox 1mg/kg/day BID - plan to hold now for hematuria to resolve so a CT urogram can be done. Pt also on aspirin.
Pt care and plan discussed and reviewed with PA. Plan as outlined above edited by me to reflect our discussion.  discussed with IR, will cont AC and monitor hematuria and blood count. may benefit from angio by IR, willdecidein a day or two
pt still having hematuria. as difficult to visualize where hematuria is coming from can hold ac until urine clears. will need to be on ac on discharge
Pt care and plan discussed and reviewed with PA. Plan as outlined above edited by me to reflect our discussion.
Patient with hematuria following rechallenging with full dose lovenox  H/h overall stable, continue lovenox per urology  pending CT urogram, may need angiogram if with persistent bleeding  will follow.
Pt care and plan discussed and reviewed with PA. Plan as outlined above edited by me to reflect our discussion. Advanced care planning/advanced directives discussed with patient/family. DNR status including forceful chest compressions to attempt to restart the heart, ventilator support/artificial breathing, electric shock, artificial nutrition, health care proxy, Molst form all discussed with pt. More than 50% of the visit was spent counseling and/or coordinating care by the attending physician.
Patient with hematuria following rechallenging with full dose lovenox  H/h overall stable  appreciate input from urology as patient is high risk for clotting  will follow
Pt care and plan discussed and reviewed with PA. Plan as outlined above edited by me to reflect our discussion. Advanced care planning/advanced directives discussed with patient/family. DNR status including forceful chest compressions to attempt to restart the heart, ventilator support/artificial breathing, electric shock, artificial nutrition, health care proxy, Molst form all discussed with pt. More than 50% of the visit was spent counseling and/or coordinating care by the attending physician.

## 2024-01-20 NOTE — PROGRESS NOTE ADULT - SUBJECTIVE AND OBJECTIVE BOX
Neurology        S: patient seen, resting in bed  hematuria improved             Medications: MEDICATIONS  (STANDING):  aspirin enteric coated 81 milliGRAM(s) Oral daily  atorvastatin 20 milliGRAM(s) Oral at bedtime  enoxaparin Injectable 100 milliGRAM(s) SubCutaneous every 12 hours  gabapentin 300 milliGRAM(s) Oral two times a day  lacosamide 150 milliGRAM(s) Oral two times a day  multivitamin 1 Tablet(s) Oral daily  pantoprazole    Tablet 40 milliGRAM(s) Oral before breakfast  sodium chloride 0.9%. 1000 milliLiter(s) (50 mL/Hr) IV Continuous <Continuous>  tamsulosin 0.4 milliGRAM(s) Oral at bedtime    MEDICATIONS  (PRN):       Vitals:  Vital Signs Last 24 Hrs  T(C): 36.5 (20 Jan 2024 21:12), Max: 36.6 (20 Jan 2024 04:50)  T(F): 97.7 (20 Jan 2024 21:12), Max: 97.9 (20 Jan 2024 04:50)  HR: 64 (20 Jan 2024 21:12) (56 - 64)  BP: 116/73 (20 Jan 2024 21:12) (101/63 - 116/73)  BP(mean): --  RR: 18 (20 Jan 2024 21:12) (18 - 18)  SpO2: 97% (20 Jan 2024 21:12) (96% - 97%)    Parameters below as of 20 Jan 2024 21:12  Patient On (Oxygen Delivery Method): room air                 General Exam:   General Appearance: Appropriately dressed and in no acute distress       Head: Normocephalic, atraumatic and no dysmorphic features  Ear, Nose, and Throat: Moist mucous membranes  CVS: S1S2+  Resp: No SOB, no wheeze or rhonchi  GI: soft NT/ND  Extremities: No edema or cyanosis  Skin: No bruises or rashes     Neurological Exam:  MS: Eyes open. Awake, alert, oriented to person, place, situation, time. Follows all commands. Attention/concentration, recent and remote memory, and fund of knowledge intact  Language: Speech is clear, fluent with good repetition & comprehension.  CNs: PERRL (R = 3mm, L = 3mm). VFF. EOMI No nystagmus.  No provocation on dizziness with head turning. V1-3 intact to LT b/l. No facial asymmetry b/l, full eye closure strength b/l. Hearing grossly normal (rubbing fingers) b/l. Tongue midline, normal movements, no atrophy. Palate with symmetric elevation. Trap 5/5 b/l  Motor: Normal muscle bulk & tone. No noticeable tremor. No pronator drift. No drift of UE or LE b/l.               Deltoid	Biceps	Triceps	   R	         5	      5	   5	 5	  		 	  L	         5	      5	   5	 5	  		    	H-Flex	K-Flex	K-Ext	D-Flex	P-Flex  R	    5	  5	   5	  5	    5		   L	    5	   5	   5	  4	    5		   *Some pain limitation on LLE.   Sensation: Intact to LT b/l throughout.   Cortical: Extinction on DSS (neglect): none  Reflexes:              Biceps(C5)       BR(C6)     Triceps(C7)               Patellar(L4)    Achilles(S1)    Plantar Resp  R	              3	          3	             3		 3		    3	Withdrawal       L	              3	          3	             3		 2		    1	Withdrawal    Coordination: No dysmetria to FTN/HTS b/l.   Gait: Deferred.     Data/Labs/Imaging which I personally reviewed.        LABS:    LABS:                          11.6   7.19  )-----------( 189      ( 20 Jan 2024 18:11 )             35.6     01-19    142  |  107  |  12  ----------------------------<  85  3.6   |  23  |  0.90    Ca    8.5      19 Jan 2024 06:39        PT/INR - ( 19 Jan 2024 06:39 )   PT: 11.2 sec;   INR: 1.07 ratio           Urinalysis Basic - ( 19 Jan 2024 06:39 )    Color: x / Appearance: x / SG: x / pH: x  Gluc: 85 mg/dL / Ketone: x  / Bili: x / Urobili: x   Blood: x / Protein: x / Nitrite: x   Leuk Esterase: x / RBC: x / WBC x   Sq Epi: x / Non Sq Epi: x / Bacteria: x

## 2024-01-21 LAB
HCT VFR BLD CALC: 34.3 % — LOW (ref 39–50)
HGB BLD-MCNC: 11.3 G/DL — LOW (ref 13–17)
MCHC RBC-ENTMCNC: 30.1 PG — SIGNIFICANT CHANGE UP (ref 27–34)
MCHC RBC-ENTMCNC: 32.9 GM/DL — SIGNIFICANT CHANGE UP (ref 32–36)
MCV RBC AUTO: 91.2 FL — SIGNIFICANT CHANGE UP (ref 80–100)
NRBC # BLD: 0 /100 WBCS — SIGNIFICANT CHANGE UP (ref 0–0)
PLATELET # BLD AUTO: 176 K/UL — SIGNIFICANT CHANGE UP (ref 150–400)
RBC # BLD: 3.76 M/UL — LOW (ref 4.2–5.8)
RBC # FLD: 13.2 % — SIGNIFICANT CHANGE UP (ref 10.3–14.5)
WBC # BLD: 6.13 K/UL — SIGNIFICANT CHANGE UP (ref 3.8–10.5)
WBC # FLD AUTO: 6.13 K/UL — SIGNIFICANT CHANGE UP (ref 3.8–10.5)

## 2024-01-21 PROCEDURE — 74178 CT ABD&PLV WO CNTR FLWD CNTR: CPT | Mod: 26

## 2024-01-21 RX ADMIN — ENOXAPARIN SODIUM 100 MILLIGRAM(S): 100 INJECTION SUBCUTANEOUS at 17:25

## 2024-01-21 RX ADMIN — ATORVASTATIN CALCIUM 20 MILLIGRAM(S): 80 TABLET, FILM COATED ORAL at 21:13

## 2024-01-21 RX ADMIN — LACOSAMIDE 150 MILLIGRAM(S): 50 TABLET ORAL at 05:33

## 2024-01-21 RX ADMIN — PANTOPRAZOLE SODIUM 40 MILLIGRAM(S): 20 TABLET, DELAYED RELEASE ORAL at 05:33

## 2024-01-21 RX ADMIN — ENOXAPARIN SODIUM 100 MILLIGRAM(S): 100 INJECTION SUBCUTANEOUS at 05:32

## 2024-01-21 RX ADMIN — GABAPENTIN 300 MILLIGRAM(S): 400 CAPSULE ORAL at 17:24

## 2024-01-21 RX ADMIN — GABAPENTIN 300 MILLIGRAM(S): 400 CAPSULE ORAL at 05:33

## 2024-01-21 RX ADMIN — LACOSAMIDE 150 MILLIGRAM(S): 50 TABLET ORAL at 17:25

## 2024-01-21 RX ADMIN — Medication 1 TABLET(S): at 11:29

## 2024-01-21 RX ADMIN — SODIUM CHLORIDE 50 MILLILITER(S): 9 INJECTION INTRAMUSCULAR; INTRAVENOUS; SUBCUTANEOUS at 05:34

## 2024-01-21 RX ADMIN — Medication 81 MILLIGRAM(S): at 11:28

## 2024-01-21 RX ADMIN — TAMSULOSIN HYDROCHLORIDE 0.4 MILLIGRAM(S): 0.4 CAPSULE ORAL at 21:13

## 2024-01-21 NOTE — PROGRESS NOTE ADULT - ASSESSMENT
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  Saw his urologist  was started on an antibioti. Patient also reports several episodes of severe intermittent left flank pain today, denies history of kidney stones to his knowledge.  Denies associated nausea, vomiting, diarrhea, dysuria, fever, chill  LDL 60  LE duplex with + B/L below knee DVT   outpatient hypercoag workup in past neg as part of stroke workup   1/19 plan for IR procedure today. 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  - statin therapy with LDL goal < 70mg/dL; atorvastatin 20 at home   - f/u  for flank pain, hematuria and obstruction   - abx for UTI   - PT/OT   - meclizion prn for vertigo   - check FS, glucose control <180  - GI/DVT    - Thank you for allowing me to participate in the care of this patient. Call with questions.       Covering for Braxton Forde MD  Neurology  Office: [phone]

## 2024-01-21 NOTE — PROGRESS NOTE ADULT - SUBJECTIVE AND OBJECTIVE BOX
Neurology        S: patient seen, resting in bed  hematuria improved   MEDICATIONS:    aspirin enteric coated 81 milliGRAM(s) Oral daily  atorvastatin 20 milliGRAM(s) Oral at bedtime  enoxaparin Injectable 100 milliGRAM(s) SubCutaneous every 12 hours  gabapentin 300 milliGRAM(s) Oral two times a day  lacosamide 150 milliGRAM(s) Oral two times a day  multivitamin 1 Tablet(s) Oral daily  pantoprazole    Tablet 40 milliGRAM(s) Oral before breakfast  sodium chloride 0.9%. 1000 milliLiter(s) IV Continuous <Continuous>  tamsulosin 0.4 milliGRAM(s) Oral at bedtime      LABS:                          11.3   6.13  )-----------( 176      ( 21 Jan 2024 07:42 )             34.3           CAPILLARY BLOOD GLUCOSE            I&O's Summary    20 Jan 2024 07:01  -  21 Jan 2024 07:00  --------------------------------------------------------  IN: 1700 mL / OUT: 2700 mL / NET: -1000 mL      Vital Signs Last 24 Hrs  T(C): 36.3 (21 Jan 2024 12:16), Max: 36.5 (20 Jan 2024 21:12)  T(F): 97.4 (21 Jan 2024 12:16), Max: 97.7 (20 Jan 2024 21:12)  HR: 67 (21 Jan 2024 12:16) (58 - 67)  BP: 103/63 (21 Jan 2024 12:16) (100/61 - 116/73)  BP(mean): --  RR: 18 (21 Jan 2024 12:16) (18 - 18)  SpO2: 97% (21 Jan 2024 12:16) (97% - 97%)    Parameters below as of 21 Jan 2024 12:16  Patient On (Oxygen Delivery Method): room air        General Exam:   General Appearance: Appropriately dressed and in no acute distress       Head: Normocephalic, atraumatic and no dysmorphic features  Ear, Nose, and Throat: Moist mucous membranes  CVS: S1S2+  Resp: No SOB, no wheeze or rhonchi  GI: soft NT/ND  Extremities: No edema or cyanosis  Skin: No bruises or rashes     Neurological Exam:  MS: Eyes open. Awake, alert, oriented to person, place, situation, time. Follows all commands. Attention/concentration, recent and remote memory, and fund of knowledge intact  Language: Speech is clear, fluent with good repetition & comprehension.  CNs: PERRL (R = 3mm, L = 3mm). VFF. EOMI No nystagmus.  No provocation on dizziness with head turning. V1-3 intact to LT b/l. No facial asymmetry b/l, full eye closure strength b/l. Hearing grossly normal (rubbing fingers) b/l. Tongue midline, normal movements, no atrophy. Palate with symmetric elevation. Trap 5/5 b/l  Motor: Normal muscle bulk & tone. No noticeable tremor. No pronator drift. No drift of UE or LE b/l.               Deltoid	Biceps	Triceps	   R	         5	      5	   5	 5	  		 	  L	         5	      5	   5	 5	  		    	H-Flex	K-Flex	K-Ext	D-Flex	P-Flex  R	    5	  5	   5	  5	    5		   L	    5	   5	   5	  4	    5		   *Some pain limitation on LLE.   Sensation: Intact to LT b/l throughout.   Cortical: Extinction on DSS (neglect): none  Reflexes:              Biceps(C5)       BR(C6)     Triceps(C7)               Patellar(L4)    Achilles(S1)    Plantar Resp  R	              3	          3	             3		 3		    3	Withdrawal       L	              3	          3	             3		 2		    1	Withdrawal    Coordination: No dysmetria to FTN/HTS b/l.   Gait: Deferred.     Data/Labs/Imaging which I personally reviewed.        LABS:    LABS:                          11.6   7.19  )-----------( 189      ( 20 Jan 2024 18:11 )             35.6     01-19    142  |  107  |  12  ----------------------------<  85  3.6   |  23  |  0.90    Ca    8.5      19 Jan 2024 06:39        PT/INR - ( 19 Jan 2024 06:39 )   PT: 11.2 sec;   INR: 1.07 ratio           Urinalysis Basic - ( 19 Jan 2024 06:39 )    Color: x / Appearance: x / SG: x / pH: x  Gluc: 85 mg/dL / Ketone: x  / Bili: x / Urobili: x   Blood: x / Protein: x / Nitrite: x   Leuk Esterase: x / RBC: x / WBC x   Sq Epi: x / Non Sq Epi: x / Bacteria: x

## 2024-01-21 NOTE — PROGRESS NOTE ADULT - ASSESSMENT
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 ED on 1/9/2024 for gross hematuria and intermittent suprapubic and L flank pain. CT abd pelvis showed mild L hydro to the level of the UPJ with associated blood products in the collecting system. Labs showed WBC 13.60, Hct 42.5, Cr 1.14. UA pending. PVR 55cc.    Recs:  -IR angio negative on 1/19  -trend h/h - stable  -continue hydration and monitor urine color  -F/u repeat CT urogram  - F/u urine color now that AC has been restarted  -patient will ultimately need close outpatient follow up/work up for UPJ obstruction  -Discussed with Dr. Aaron Dumont [place] for [facility]  [address] [place], [place] [id number] [phone]

## 2024-01-21 NOTE — PROGRESS NOTE ADULT - SUBJECTIVE AND OBJECTIVE BOX
Subjective  Patient seen and examined. Resting comfortably.     Objective    Vital signs  T(F): , Max: 97.7 (01-20-24 @ 21:12)  HR: 58 (01-21-24 @ 05:21)  BP: 100/61 (01-21-24 @ 05:21)  SpO2: 97% (01-21-24 @ 05:21)  Wt(kg): --    Output     OUT:    Voided (mL): 2700 mL  Total OUT: 2700 mL    Total NET: -2700 mL      Gen: NAD  Abd: soft, nontender, nondistended  : Voiding yellow urine    Labs      01-21 @ 07:42    WBC 6.13  / Hct 34.3  / SCr --       01-20 @ 18:11    WBC 7.19  / Hct 35.6  / SCr --

## 2024-01-21 NOTE — PROGRESS NOTE ADULT - ASSESSMENT
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 with a chief complaint of 4-5 days of gross hematuria of dark red color with occasional passage of clots associated with suprapubic and left sided flank pain. Patient reports that when his hematuria began, he attributed it to increase intake of cranberry juice. Patient states that when his hematuria did not subside, he presented to his outpatient Urologist's office for which he was started on Cipro for UTI and only took one dose. Patient also endorses left flank pain. Patient denies history of nephrolithiasis or previous episodes of hematuria. Patient denies dysuria, frequency or urgency, chills, body aches, nausea, vomiting, headache, lightheadedness or dizziness.       Hematuria   - Pt with waxing and waning gross hematuria; CT w/ hemorrhage    - Check CBC - Monitor H/H and Hgb closely --> Mild drop in Hgb, overall stable   - Was resumed on full dose AC. With recurrent hematuria. Urology follow up appreciated   - CT Urogram as noted, ? neoplasm; Urology and IR follow up appreciated --> Plan for repeat CT urogram once hematuria resolves to R/O underlying neoplasm. Patient on full dose AC  - Rack urine to assess color output  - Check PVR   - s/p Left renal angiogram with interrogation of the left renal artery and accessory left renal artery without evidence of active bleeding or pseudoaneurysm.   - Urology, IR, Heme/Onc evaluations appreciated; F/u recs  - check CT urogram - done  -patient will ultimately need close outpatient follow up/work up for UPJ obstruction    UTI   - With associated flank pain - now resolved.   - S/P course of Rocephin. UCx --> negative   - Hydration as tolerated   - Monitor CBC, Temp Curve, VS and patient closely     Hydronephrosis   - CT w/ mild L hydronephrosis with abrupt tapering of the dilated renal collecting system at the level of the UPJ, Hyperdense material within the dilated L upper renal pole most likely representing hemorrhage, Moderate L Perinephric/ periureteric stranding, no calculi identified   - C/w Tamsulosin; Monitor I and O   - As per Urology, patient will require close outpatient follow up and work up for UPJ obstruction   - Urology eval appreciated; F/u recs    Hemorrhage   - Seen on CT as above  - Duplex with possible soleal muscle hematoma ? -- Monitor H/H --> Repeat Duplex in 1 week --> ordered for 1/19   - Check serial CBC; Monitor H/H closely. Transfuse for Hgb < 7.0   - Urology eval appreciated; F/u recs    DVT  - LE Duplex w/ B/L soleal vein and right peroneal vein thrombus, Acute DVT below the knee    - Was on Lovenox for  Mg Qd. 100 BID resumed, Monitor   - Vascular Cardiology eval appreciated; F/u recs --> Duplex 1/12 with resolution of DVT, Check Duplex 1/19   - Hematology eval appreciated; F/u recs  --> Patient will require full dose AC on DC per Heme/Onc     Seizure   - Per history   - C/w Vimpat 150 BID, off of Keppra as per Neuro.   - Seizure precautions  - Neuro eval appreciated; F/u recs    CVA  - Per History   - On ASA, Lovenox and Statin  - Neuro eval appreciated     ADHD  - Vyvanse 50 Mg not available in patient. Resume on DC  - Fall, Seizure, Aspiration precautions  - Neuro eval appreciated; F/u recs     LLE Fasciotomy   - 2/2 compartment syndrome.   - Vascular surgery eval appreciated; F/u recs --> No acute intervention at this time     HLD  - Lipid panel noted; C/w Lipitor 20    Vertigo  - Per History. No longer on Meclizine per patient  - Fall, Seizure and Aspiration precautions   - Ambulate with Assistance  - PT eval     PPX  - Lovenox   - PPI

## 2024-01-21 NOTE — PROGRESS NOTE ADULT - SUBJECTIVE AND OBJECTIVE BOX
Per last PCP notes, upper gi was ordered 2/2023. Patient reports he never had this done. I explained that his symptoms of anorexia and weight loss, combined with BMI of 17 is very concerning. He was urged today to see GI, referral placed. Denies abdominal pain, change in bowel habits, or blood with stools. Needs follow-up in 1 week for full exam and further discussion. DATE OF SERVICE: 01-21-24 @ 17:52    Patient is a 53y old  Male who presents with a chief complaint of Hematuria (21 Jan 2024 13:18)      SUBJECTIVE / OVERNIGHT EVENTS:  No chest pain. No shortness of breath. No complaints. No events overnight.     MEDICATIONS  (STANDING):  aspirin enteric coated 81 milliGRAM(s) Oral daily  atorvastatin 20 milliGRAM(s) Oral at bedtime  enoxaparin Injectable 100 milliGRAM(s) SubCutaneous every 12 hours  gabapentin 300 milliGRAM(s) Oral two times a day  lacosamide 150 milliGRAM(s) Oral two times a day  multivitamin 1 Tablet(s) Oral daily  pantoprazole    Tablet 40 milliGRAM(s) Oral before breakfast  sodium chloride 0.9%. 1000 milliLiter(s) (50 mL/Hr) IV Continuous <Continuous>  tamsulosin 0.4 milliGRAM(s) Oral at bedtime    MEDICATIONS  (PRN):      Vital Signs Last 24 Hrs  T(C): 36.3 (21 Jan 2024 12:16), Max: 36.5 (20 Jan 2024 21:12)  T(F): 97.4 (21 Jan 2024 12:16), Max: 97.7 (20 Jan 2024 21:12)  HR: 67 (21 Jan 2024 12:16) (58 - 67)  BP: 103/63 (21 Jan 2024 12:16) (100/61 - 116/73)  BP(mean): --  RR: 18 (21 Jan 2024 12:16) (18 - 18)  SpO2: 97% (21 Jan 2024 12:16) (97% - 97%)    Parameters below as of 21 Jan 2024 12:16  Patient On (Oxygen Delivery Method): room air      CAPILLARY BLOOD GLUCOSE        I&O's Summary    20 Jan 2024 07:01  -  21 Jan 2024 07:00  --------------------------------------------------------  IN: 1750 mL / OUT: 2700 mL / NET: -950 mL    21 Jan 2024 07:01  -  21 Jan 2024 17:52  --------------------------------------------------------  IN: 1270 mL / OUT: 1160 mL / NET: 110 mL        PHYSICAL EXAM:  GENERAL: NAD, well-developed  HEAD:  Atraumatic, Normocephalic  EYES: EOMI, PERRLA, conjunctiva and sclera clear  NECK: Supple, No JVD  CHEST/LUNG: Clear to auscultation bilaterally; No wheeze  HEART: Regular rate and rhythm; No murmurs, rubs, or gallops  ABDOMEN: Soft, Nontender, Nondistended; Bowel sounds present  EXTREMITIES:  2+ Peripheral Pulses, No clubbing, cyanosis, or edema  PSYCH: AAOx3  NEUROLOGY: non-focal  SKIN: No rashes or lesions    LABS:                        11.3   6.13  )-----------( 176      ( 21 Jan 2024 07:42 )             34.3             < from: CT Abdomen and Pelvis Urogram w/wo IV Cont (01.21.24 @ 16:37) >    IMPRESSION:  Stable mild dilatation of bilateral intrarenal collecting system with   changing caliber at bilateral UVJs. No filling defects in the collecting   system. Persistent fluid and thickening around the left renal pelvis is   improved.  No abnormality of the urinary bladder.      < end of copied text >          RADIOLOGY & ADDITIONAL TESTS:    Imaging Personally Reviewed:    Consultant(s) Notes Reviewed:      Care Discussed with Consultants/Other Providers:

## 2024-01-22 ENCOUNTER — TRANSCRIPTION ENCOUNTER (OUTPATIENT)
Age: 54
End: 2024-01-22

## 2024-01-22 VITALS
HEART RATE: 66 BPM | OXYGEN SATURATION: 98 % | SYSTOLIC BLOOD PRESSURE: 101 MMHG | RESPIRATION RATE: 18 BRPM | TEMPERATURE: 98 F | DIASTOLIC BLOOD PRESSURE: 64 MMHG

## 2024-01-22 LAB
ANION GAP SERPL CALC-SCNC: 12 MMOL/L — SIGNIFICANT CHANGE UP (ref 5–17)
BUN SERPL-MCNC: 20 MG/DL — SIGNIFICANT CHANGE UP (ref 7–23)
CALCIUM SERPL-MCNC: 8.6 MG/DL — SIGNIFICANT CHANGE UP (ref 8.4–10.5)
CHLORIDE SERPL-SCNC: 108 MMOL/L — SIGNIFICANT CHANGE UP (ref 96–108)
CO2 SERPL-SCNC: 23 MMOL/L — SIGNIFICANT CHANGE UP (ref 22–31)
CREAT SERPL-MCNC: 1.03 MG/DL — SIGNIFICANT CHANGE UP (ref 0.5–1.3)
EGFR: 87 ML/MIN/1.73M2 — SIGNIFICANT CHANGE UP
GLUCOSE SERPL-MCNC: 91 MG/DL — SIGNIFICANT CHANGE UP (ref 70–99)
HCT VFR BLD CALC: 35.2 % — LOW (ref 39–50)
HGB BLD-MCNC: 11.6 G/DL — LOW (ref 13–17)
MCHC RBC-ENTMCNC: 30.2 PG — SIGNIFICANT CHANGE UP (ref 27–34)
MCHC RBC-ENTMCNC: 33 GM/DL — SIGNIFICANT CHANGE UP (ref 32–36)
MCV RBC AUTO: 91.7 FL — SIGNIFICANT CHANGE UP (ref 80–100)
NRBC # BLD: 0 /100 WBCS — SIGNIFICANT CHANGE UP (ref 0–0)
PLATELET # BLD AUTO: 168 K/UL — SIGNIFICANT CHANGE UP (ref 150–400)
POTASSIUM SERPL-MCNC: 3.7 MMOL/L — SIGNIFICANT CHANGE UP (ref 3.5–5.3)
POTASSIUM SERPL-SCNC: 3.7 MMOL/L — SIGNIFICANT CHANGE UP (ref 3.5–5.3)
RBC # BLD: 3.84 M/UL — LOW (ref 4.2–5.8)
RBC # FLD: 13.3 % — SIGNIFICANT CHANGE UP (ref 10.3–14.5)
SODIUM SERPL-SCNC: 143 MMOL/L — SIGNIFICANT CHANGE UP (ref 135–145)
WBC # BLD: 5.77 K/UL — SIGNIFICANT CHANGE UP (ref 3.8–10.5)
WBC # FLD AUTO: 5.77 K/UL — SIGNIFICANT CHANGE UP (ref 3.8–10.5)

## 2024-01-22 PROCEDURE — 97162 PT EVAL MOD COMPLEX 30 MIN: CPT

## 2024-01-22 PROCEDURE — 97116 GAIT TRAINING THERAPY: CPT

## 2024-01-22 PROCEDURE — 86850 RBC ANTIBODY SCREEN: CPT

## 2024-01-22 PROCEDURE — C1769: CPT

## 2024-01-22 PROCEDURE — 84443 ASSAY THYROID STIM HORMONE: CPT

## 2024-01-22 PROCEDURE — 84100 ASSAY OF PHOSPHORUS: CPT

## 2024-01-22 PROCEDURE — 36251 INS CATH REN ART 1ST UNILAT: CPT

## 2024-01-22 PROCEDURE — 36415 COLL VENOUS BLD VENIPUNCTURE: CPT

## 2024-01-22 PROCEDURE — 80048 BASIC METABOLIC PNL TOTAL CA: CPT

## 2024-01-22 PROCEDURE — 74178 CT ABD&PLV WO CNTR FLWD CNTR: CPT

## 2024-01-22 PROCEDURE — 97530 THERAPEUTIC ACTIVITIES: CPT

## 2024-01-22 PROCEDURE — 93970 EXTREMITY STUDY: CPT

## 2024-01-22 PROCEDURE — 99285 EMERGENCY DEPT VISIT HI MDM: CPT

## 2024-01-22 PROCEDURE — 80061 LIPID PANEL: CPT

## 2024-01-22 PROCEDURE — 83735 ASSAY OF MAGNESIUM: CPT

## 2024-01-22 PROCEDURE — 86901 BLOOD TYPING SEROLOGIC RH(D): CPT

## 2024-01-22 PROCEDURE — 96374 THER/PROPH/DIAG INJ IV PUSH: CPT

## 2024-01-22 PROCEDURE — 81001 URINALYSIS AUTO W/SCOPE: CPT

## 2024-01-22 PROCEDURE — 86900 BLOOD TYPING SEROLOGIC ABO: CPT

## 2024-01-22 PROCEDURE — 85730 THROMBOPLASTIN TIME PARTIAL: CPT

## 2024-01-22 PROCEDURE — 85025 COMPLETE CBC W/AUTO DIFF WBC: CPT

## 2024-01-22 PROCEDURE — 99232 SBSQ HOSP IP/OBS MODERATE 35: CPT

## 2024-01-22 PROCEDURE — C1887: CPT

## 2024-01-22 PROCEDURE — 85027 COMPLETE CBC AUTOMATED: CPT

## 2024-01-22 PROCEDURE — 80053 COMPREHEN METABOLIC PANEL: CPT

## 2024-01-22 PROCEDURE — 85610 PROTHROMBIN TIME: CPT

## 2024-01-22 PROCEDURE — 87086 URINE CULTURE/COLONY COUNT: CPT

## 2024-01-22 PROCEDURE — C1894: CPT

## 2024-01-22 PROCEDURE — 74176 CT ABD & PELVIS W/O CONTRAST: CPT | Mod: MA

## 2024-01-22 RX ORDER — ENOXAPARIN SODIUM 100 MG/ML
100 INJECTION SUBCUTANEOUS
Qty: 60 | Refills: 0
Start: 2024-01-22 | End: 2024-02-20

## 2024-01-22 RX ORDER — ENOXAPARIN SODIUM 100 MG/ML
150 INJECTION SUBCUTANEOUS
Refills: 0 | DISCHARGE

## 2024-01-22 RX ADMIN — LACOSAMIDE 150 MILLIGRAM(S): 50 TABLET ORAL at 05:31

## 2024-01-22 RX ADMIN — ENOXAPARIN SODIUM 100 MILLIGRAM(S): 100 INJECTION SUBCUTANEOUS at 05:32

## 2024-01-22 RX ADMIN — GABAPENTIN 300 MILLIGRAM(S): 400 CAPSULE ORAL at 05:32

## 2024-01-22 RX ADMIN — PANTOPRAZOLE SODIUM 40 MILLIGRAM(S): 20 TABLET, DELAYED RELEASE ORAL at 05:32

## 2024-01-22 NOTE — DISCHARGE NOTE PROVIDER - NSDCCPCAREPLAN_GEN_ALL_CORE_FT
PRINCIPAL DISCHARGE DIAGNOSIS  Diagnosis: Painful hematuria  Assessment and Plan of Treatment: Improved      SECONDARY DISCHARGE DIAGNOSES  Diagnosis: Acute UTI  Assessment and Plan of Treatment: HOME CARE INSTRUCTIONS  f you were prescribed antibiotics, take them exactly as your caregiver instructs you. Finish the medication even if you feel better after you have only taken some of the medication.  Drink enough water and fluids to keep your urine clear or pale yellow.  Avoid caffeine, tea, and carbonated beverages. They tend to irritate your bladder.  Empty your bladder often. Avoid holding urine for long periods of time.  Empty your bladder before and after sexual intercourse.  After a bowel movement, women should cleanse from front to back. Use each tissue only once.  SEEK MEDICAL CARE IF:  You have back pain.  You develop a fever.  Your symptoms do not begin to resolve within 3 days.  SEEK IMMEDIATE MEDICAL CARE IF:  You have severe back pain or lower abdominal pain.  You develop chills.  You have nausea or vomiting.  You have continued burning or discomfort with urination.      Diagnosis: Lt flank pain  Assessment and Plan of Treatment: improved    Diagnosis: DVT of lower extremity (deep venous thrombosis)  Assessment and Plan of Treatment: improving on AC    Diagnosis: Hydronephrosis, left  Assessment and Plan of Treatment:     Diagnosis: UPJ (ureteropelvic junction) obstruction  Assessment and Plan of Treatment: patient will require close outpatient follow up and work up for UPJ obstruction     PRINCIPAL DISCHARGE DIAGNOSIS  Diagnosis: Painful hematuria  Assessment and Plan of Treatment: You were evaluated by the urology team and were found to have a mild left hydronephrosis.  You had an embolization procedure was done on 1/19 and no active bleeding was found.   CT urogram was done that showed no filing defect.    Anticoagulation was resumed and your hemoglobin remained stable.  Continue with Lovenox as prescribed.   Follow up with urology Dr. Walker within 2 weeks from discharge.         SECONDARY DISCHARGE DIAGNOSES  Diagnosis: Acute UTI  Assessment and Plan of Treatment: HOME CARE INSTRUCTIONS  f you were prescribed antibiotics, take them exactly as your caregiver instructs you. Finish the medication even if you feel better after you have only taken some of the medication.  Drink enough water and fluids to keep your urine clear or pale yellow.  Avoid caffeine, tea, and carbonated beverages. They tend to irritate your bladder.  Empty your bladder often. Avoid holding urine for long periods of time.  Empty your bladder before and after sexual intercourse.  After a bowel movement, women should cleanse from front to back. Use each tissue only once.  SEEK MEDICAL CARE IF:  You have back pain.  You develop a fever.  Your symptoms do not begin to resolve within 3 days.  SEEK IMMEDIATE MEDICAL CARE IF:  You have severe back pain or lower abdominal pain.  You develop chills.  You have nausea or vomiting.  You have continued burning or discomfort with urination.      Diagnosis: Lt flank pain  Assessment and Plan of Treatment: improved    Diagnosis: DVT of lower extremity (deep venous thrombosis)  Assessment and Plan of Treatment: improving on AC    Diagnosis: UPJ (ureteropelvic junction) obstruction  Assessment and Plan of Treatment: patient will require close outpatient follow up and work up for UPJ obstruction  CT urogram was done that showed no filing defect.    Anticoagulation was resumed and your hemoglobin remained stable.  Continue with Lovenox as prescribed.   Follow up with urology Dr. Walker within 2 weeks from discharge.     PRINCIPAL DISCHARGE DIAGNOSIS  Diagnosis: Painful hematuria  Assessment and Plan of Treatment: You were evaluated by the urology team and were found to have a mild left hydronephrosis.  You had an embolization procedure was done on 1/19 and no active bleeding was found.   CT urogram was done that showed no filing defect.    Anticoagulation was resumed and your hemoglobin remained stable.  Continue with Lovenox as prescribed.   Follow up with urology Dr. Walker within 2 weeks from discharge.         SECONDARY DISCHARGE DIAGNOSES  Diagnosis: Acute UTI  Assessment and Plan of Treatment: HOME CARE INSTRUCTIONS  f you were prescribed antibiotics, take them exactly as your caregiver instructs you. Finish the medication even if you feel better after you have only taken some of the medication.  Drink enough water and fluids to keep your urine clear or pale yellow.  Avoid caffeine, tea, and carbonated beverages. They tend to irritate your bladder.  Empty your bladder often. Avoid holding urine for long periods of time.  Empty your bladder before and after sexual intercourse.  After a bowel movement, women should cleanse from front to back. Use each tissue only once.  SEEK MEDICAL CARE IF:  You have back pain.  You develop a fever.  Your symptoms do not begin to resolve within 3 days.  SEEK IMMEDIATE MEDICAL CARE IF:  You have severe back pain or lower abdominal pain.  You develop chills.  You have nausea or vomiting.  You have continued burning or discomfort with urination.      Diagnosis: Lt flank pain  Assessment and Plan of Treatment: improved    Diagnosis: DVT of lower extremity (deep venous thrombosis)  Assessment and Plan of Treatment: improving on AC  Continue with Lovenox as prescribed.   Please follow up with PCP and hematologist within 1-2 weeks from discharge.   Hematology/Oncology  [facility]  [phone] (office)    Diagnosis: UPJ (ureteropelvic junction) obstruction  Assessment and Plan of Treatment: patient will require close outpatient follow up and work up for UPJ obstruction  CT urogram was done that showed no filing defect.    Anticoagulation was resumed and your hemoglobin remained stable.  Continue with Lovenox as prescribed.   Follow up with urology Dr. Walker within 2 weeks from discharge.

## 2024-01-22 NOTE — PROGRESS NOTE ADULT - SUBJECTIVE AND OBJECTIVE BOX
Subjective  Patient seen and examined. Resting comfortably.       Objective    Vital signs  T(F): , Max: 97.5 (01-21-24 @ 20:22)  HR: 58 (01-22-24 @ 05:09)  BP: 106/72 (01-22-24 @ 05:09)  SpO2: 98% (01-22-24 @ 05:09)  Wt(kg): --    Output     OUT:    Voided (mL): 1560 mL  Total OUT: 1560 mL    Total NET: -1560 mL    Gen: NAD  Abd: soft, nontender, nondistended  : Voiding yellow urine    Labs      01-22 @ 07:11    WBC 5.77  / Hct 35.2  / SCr --       01-22 @ 07:08    WBC --    / Hct --    / SCr 1.03       Imaging  < from: CT Abdomen and Pelvis Urogram w/wo IV Cont (01.21.24 @ 16:37) >    ACC: [id number] EXAM:  CT ABD PELV UROGRAM WAW IC   ORDERED BY:  ALLA SMYTH     PROCEDURE DATE:  01/21/2024          INTERPRETATION:  CLINICAL INFORMATION: Hematuria.    ADDITIONAL CLINICAL INFORMATION: Hematuria R31.9    COMPARISON: 1/15/2024.    CONTRAST/COMPLICATIONS:  IV Contrast: Omnipaque 350  90 cc administered   10 cc discarded  Oral Contrast: NONE  Complications: None reported at time of study completion    PROCEDURE:  CT of the Abdomen and Pelvis was performed.  Precontrast, Nephrographic and Excretory phases were acquired (CT   UROGRAM).  10 mg of Lasix was administered.  Sagittal and coronal reformats were performed.    FINDINGS:  LOWER CHEST: Within normal limits.    LIVER: Left hepatic cyst.  BILE DUCTS: Normal caliber.  GALLBLADDER: Within normal limits.  SPLEEN: Within normal limits.  PANCREAS: Within normal limits.  ADRENALS: Within normal limits.  KIDNEYS/URETERS: No renal or ureteral stones bilaterally. Stable mild   dilatation of the bilateral intrarenalcollecting system with narrowing   at bilateral UPJ. Mild fluid and thickening around the left renal pelvis   is improved. No filling defects in the collecting systems.    BLADDER: Within normal limits.  REPRODUCTIVE ORGANS: Prostate is enlarged.    BOWEL: No bowel obstruction. Appendix is normal. Scattered colonic   diverticulosis without acute diverticulitis.  PERITONEUM: No ascites.  VESSELS: Atherosclerotic changes. Circumaortic left renal vein.  RETROPERITONEUM/LYMPH NODES: No lymphadenopathy.  ABDOMINAL WALL: Small fat-containing left inguinal hernia. Subcutaneous   injections in the anterior abdominal wall. Bowel and fat-containing   umbilical hernia.  BONES: Within normal limits.    IMPRESSION:  Stable mild dilatation of bilateral intrarenal collecting system with   changing caliber at bilateral UVJs. No filling defects in the collecting   system. Persistent fluid and thickening around the left renal pelvis is   improved.  No abnormality of the urinary bladder.      --- End of Report ---            DAISY MARTINEZ MD; Attending Radiologist  This document has been electronically signed. Jan 21 2024  5:24PM    < end of copied text >

## 2024-01-22 NOTE — DISCHARGE NOTE PROVIDER - NSDCMRMEDTOKEN_GEN_ALL_CORE_FT
aspirin 81 mg oral delayed release tablet: 1 tab(s) orally once a day  atorvastatin 20 mg oral tablet: 1 tab(s) orally once a day  enoxaparin 150 mg/mL injectable solution: 150 milligram(s) subcutaneously once a day  gabapentin 300 mg oral capsule: 1 cap(s) orally 2 times a day  lacosamide 150 mg oral tablet: 1 tab(s) orally 2 times a day  Multiple Vitamins oral tablet: 1 tab(s) orally once a day  tamsulosin 0.4 mg oral capsule: 1 cap(s) orally once a day  Vyvanse 50 mg oral capsule: 1 cap(s) orally once a day   aspirin 81 mg oral delayed release tablet: 1 tab(s) orally once a day  atorvastatin 20 mg oral tablet: 1 tab(s) orally once a day  gabapentin 300 mg oral capsule: 1 cap(s) orally 2 times a day  lacosamide 150 mg oral tablet: 1 tab(s) orally 2 times a day  Lovenox 100 mg/mL injectable solution: 100 milligram(s) subcutaneously every 12 hours  Multiple Vitamins oral tablet: 1 tab(s) orally once a day  Outpatient Physical Therapy: Outpatient Physical Therapy  Dx: History of cerebrovascular accident I63.9  tamsulosin 0.4 mg oral capsule: 1 cap(s) orally once a day  Vyvanse 50 mg oral capsule: 1 cap(s) orally once a day

## 2024-01-22 NOTE — DISCHARGE NOTE NURSING/CASE MANAGEMENT/SOCIAL WORK - PATIENT PORTAL LINK FT
You can access the FollowMyHealth Patient Portal offered by [facility] by registering at the following website: [email or web address]. By joining [employer]’s FollowMyHealth portal, you will also be able to view your health information using other applications (apps) compatible with our system.

## 2024-01-22 NOTE — DISCHARGE NOTE PROVIDER - NSDCFUSCHEDAPPT_GEN_ALL_CORE_FT
Aaron Maurer  [facility] PST-Presurgtest  Scheduled Appointment: 01/25/2024    [facility]  ORTHOSURG [address] [facility]  Scheduled Appointment: 01/29/2024    Aaron Maurer  [facility] END-Endoscopy  Scheduled Appointment: 01/31/2024    Aaron Maurer Physician [facility]  [address]  Scheduled Appointment: 01/31/2024    [facility]  DERM [address]  Scheduled Appointment: 02/28/2024

## 2024-01-22 NOTE — PROGRESS NOTE ADULT - ASSESSMENT
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 Patient reports that when his hematuria began, he attributed it to increase intake of cranberry juice. Patient states that when his hematuria did not subside, he presented to his outpatient Urologist's office for which he was started on Cipro for UTI and only took one dose. Patient also endorses left flank pain. Patient denies history of nephrolithiasis or previous episodes of hematuria. Patient denies dysuria, frequency or urgency, chills, body aches, nausea, vomiting, headache, lightheadedness or dizziness.       Hematuria  resolved  - Pt with waxing and waning gross hematuria; CT w/ hemorrhage    - Check CBC - Monitor H/H and Hgb closely --> Mild drop in Hgb, overall stable   - Was resumed on full dose AC. With recurrent hematuria. Urology follow up appreciated   - CT Urogram as noted, ? neoplasm; Urology and IR follow up appreciated --> Plan for repeat CT urogram once hematuria resolves to R/O underlying neoplasm. Patient on full dose AC  - Rack urine to assess color output  - Check PVR   - s/p Left renal angiogram with interrogation of the left renal artery and accessory left renal artery without evidence of active bleeding or pseudoaneurysm.   - Urology, IR, Heme/Onc evaluations appreciated; F/u recs  - check CT urogram - done  -patient will ultimately need close outpatient follow up/work up for UPJ obstruction    UTI   - With associated flank pain - now resolved.   - S/P course of Rocephin. UCx --> negative   - Hydration as tolerated   - Monitor CBC, Temp Curve, VS and patient closely     Hydronephrosis   - CT w/ mild L hydronephrosis with abrupt tapering of the dilated renal collecting system at the level of the UPJ, Hyperdense material within the dilated L upper renal pole most likely representing hemorrhage, Moderate L Perinephric/ periureteric stranding, no calculi identified   - C/w Tamsulosin; Monitor I and O   - As per Urology, patient will require close outpatient follow up and work up for UPJ obstruction   - Urology eval appreciated; F/u recs    Hemorrhage   - Seen on CT as above  - Duplex with possible soleal muscle hematoma ? -- Monitor H/H --> Repeat Duplex in 1 week --> ordered for 1/19   - Check serial CBC; Monitor H/H closely. Transfuse for Hgb < 7.0   - Urology eval appreciated; F/u recs    DVT  - LE Duplex w/ B/L soleal vein and right peroneal vein thrombus, Acute DVT below the knee    - Was on Lovenox for  Mg Qd. 100 BID resumed, Monitor   - Vascular Cardiology eval appreciated; F/u recs --> Duplex 1/12 with resolution of DVT, Check Duplex 1/19   - Hematology eval appreciated; F/u recs  --> Patient will require full dose AC on DC per Heme/Onc     Seizure   - Per history   - C/w Vimpat 150 BID, off of Keppra as per Neuro.   - Seizure precautions  - Neuro eval appreciated; F/u recs    CVA  - Per History   - On ASA, Lovenox and Statin  - Neuro eval appreciated     ADHD  - Vyvanse 50 Mg not available in patient. Resume on DC  - Fall, Seizure, Aspiration precautions  - Neuro eval appreciated; F/u recs     LLE Fasciotomy   - 2/2 compartment syndrome.   - Vascular surgery eval appreciated; F/u recs --> No acute intervention at this time     HLD  - Lipid panel noted; C/w Lipitor 20    Vertigo  - Per History. No longer on Meclizine per patient  - Fall, Seizure and Aspiration precautions   - Ambulate with Assistance  - PT eval     PPX  - Lovenox   - PPI     cleared by urology and IR for discharge

## 2024-01-22 NOTE — DISCHARGE NOTE PROVIDER - CARE PROVIDERS DIRECT ADDRESSES
[email or web address]

## 2024-01-22 NOTE — PROGRESS NOTE ADULT - REASON FOR ADMISSION
Hematuria

## 2024-01-22 NOTE — PROGRESS NOTE ADULT - ASSESSMENT
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 CT abd pelvis showed mild L hydro to the level of the UPJ with associated blood products in the collecting system. Labs showed WBC 13.60, Hct 42.5, Cr 1.14. UA pending. PVR 55cc.    Recs:  -IR angio negative on 1/19  -trend h/h - stable  -continue hydration and monitor urine color  -F/u repeat CT urogram - shows no filling defects and clot that was previously in left collecting system has no cleared  - F/u urine color now that AC has been restarted - clear yellow this AM  -patient will ultimately need close outpatient follow up/work up for UPJ obstruction with Dr. Walker within 2 weeks of discharge  - Please reconsult urology as needed  -Discussed with Dr. Walker    [facility]  [address] [place] [phone]

## 2024-01-22 NOTE — PROGRESS NOTE ADULT - PROVIDER SPECIALTY LIST ADULT
Heme/Onc
Internal Medicine
Neurology
Urology
Vascular Surgery
Heme/Onc
Internal Medicine
Intervent Radiology
Urology
Heme/Onc
Internal Medicine
Urology
Vascular Surgery
Internal Medicine
Neurology
Neurology
Urology
Urology
Heme/Onc
Internal Medicine
Neurology
Neurology
Internal Medicine
Internal Medicine

## 2024-01-22 NOTE — DISCHARGE NOTE PROVIDER - DISCHARGE DIET
Regular Diet - No restrictions/DASH Diet Regular Diet - No restrictions/DASH Diet/Other Diet Instructions

## 2024-01-22 NOTE — DISCHARGE NOTE PROVIDER - CARE PROVIDER_API CALL
Brian Wells  Urology  [address] [place] [address] - [place] [address]  Phone: [phone]  Fax: [phone]  Follow Up Time: 1 week   Doni Walker  [facility]  Urology  [address] [facility], [address]  [place] [address]  Phone: [phone]  Fax: [phone]  Follow Up Time: 2 weeks    Rodolfo Johnson  Internal Medicine  [address] [place] [address]  Phone: [phone]  Fax: [phone]  Established Patient  Follow Up Time: 2 weeks

## 2024-01-22 NOTE — DISCHARGE NOTE PROVIDER - HOSPITAL COURSE
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 with a chief complaint of 4-5 days of gross hematuria of dark red color with occasional passage of clots associated with suprapubic and left sided flank pain. Patient reports that when his hematuria began, he attributed it to increase intake of cranberry juice. Patient states that when his hematuria did not subside, he presented to his outpatient Urologist's office for which he was started on Cipro for UTI and only took one dose. Patient also endorses left flank pain. Patient denies history of nephrolithiasis or previous episodes of hematuria. Patient denies dysuria, frequency or urgency, chills, body aches, nausea, vomiting, headache, lightheadedness or dizziness.        Hospital Course:  Patient admitted with intermittent suprapubic and L flank pain and UTI.   CT abd pelvis showed mild L hydro to the level of the UPJ with associated blood products in the collecting system. Urology consulted and recommended CT urogram and IR angiogram. Patient s/p Left renal angiogram with interrogation of the left renal artery and accessory left renal artery without evidence of active bleeding or pseudoaneurysm.  CT urogram Stable with mild dilatation of bilateral intrarenal collecting system with   changing caliber at bilateral UVJs. No filling defects in the collecting system.  Neurology consulted for possible stroke up. LE Duplex w/ B/L soleal vein and right peroneal vein thrombus. Vascular and cardiology consulted for positive DVT.  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 Patient reports that when his hematuria began, he attributed it to increase intake of cranberry juice. Patient states that when his hematuria did not subside, he presented to his outpatient Urologist's office for which he was started on Cipro for UTI and only took one dose. Patient also endorses left flank pain. Patient denies history of nephrolithiasis or previous episodes of hematuria. Patient denies dysuria, frequency or urgency, chills, body aches, nausea, vomiting, headache, lightheadedness or dizziness.        Hospital Course:  Patient admitted with intermittent suprapubic and L flank pain and UTI.   CT abd pelvis showed mild L hydro to the level of the UPJ with associated blood products in the collecting system. Urology consulted and recommended CT urogram and IR angiogram. Patient s/p Left renal angiogram with interrogation of the left renal artery and accessory left renal artery without evidence of active bleeding or pseudoaneurysm.  CT urogram Stable with mild dilatation of bilateral intrarenal collecting system with   changing caliber at bilateral UVJs. No filling defects in the collecting system.  Neurology consulted for possible stroke up. LE Duplex w/ B/L soleal vein and right peroneal vein thrombus. Vascular and cardiology consulted for positive DVT.    Important Medication Changes and Reason:  Continue with lovenox 100mg q12hrs    Active or Pending Issues Requiring Follow-up:  patient will require close outpatient follow up and work up for UPJ obstruction    Advanced Directives:   [X ] Full code  [ ] DNR  [ ] Hospice    Discharge Diagnoses:  Left hydronephrosis  UTI  Hematuria  Suprapubic Flank pain  UPJ obstruction  B/L Soleal DVT

## 2024-01-22 NOTE — DISCHARGE NOTE PROVIDER - NPI NUMBER (FOR SYSADMIN USE ONLY) :
11/2/2018      Chief Complaint   Patient presents with    Follow-up    Prostate Cancer     PSA= <0 1(10/04/18)       Assessment and Plan    79 y o  male managed by Dr Timothy Handy    1  Ily[place] 7 prostate cancer s/p RALP 11/2/16   - PSA < 0 1 (10/4/18)  - the patient is exactly 2 years out from surgery so we can start every 6 month surveillance PSAs     2  ED  - continue Viagra 100mg     FU 6 months with PSA prior       History of Present Illness  Lora Oleary is a 79 y o  male here for follow up evaluation of [place] 7 prostate cancer s/p RALP 11/2/16  Final pathology revealed [place] 7 prostate cancer with negative surgical margins  There was no evidence of extra capsular extension or seminal vesicle invasion  Did have a PSA draw of 0 1 12/27/17 but all other PSA have been undetectable since surgery  Is unable to obtain erections, currently on no PDE5 inhibitors  Denies any incontinence  LUTs and AUA symptoms score as below  Of note, the patient did have a midline incisional hernia which was repaired a few months ago  He now has a subsequent left flank incisional hernia as well  Review of Systems   Constitutional: Negative for activity change, chills and fever  Gastrointestinal: Negative for abdominal distention and abdominal pain  Musculoskeletal: Negative for back pain and gait problem  Psychiatric/Behavioral: Negative for behavioral problems and confusion  Urinary Incontinence Screening      Most Recent Value   Urinary Incontinence   Urinary Incontinence? No   Incomplete emptying? No   Urinary frequency? Yes   Urinary urgency? No   Urinary hesitancy? No   Dysuria (painful difficult urination)? No   Nocturia (waking up to use the bathroom)? Yes [1x]   Straining (having to push to go)? No   Weak stream?  No   Intermittent stream?  No   Post void dribbling?   No        AUA SYMPTOM SCORE      Most Recent Value   AUA SYMPTOM SCORE   How often have you had a sensation of not emptying your [[id number]] bladder completely after you finished urinating? 0   How often have you had to urinate again less than two hours after you finished urinating? 2   How often have you found you stopped and started again several times when you urinate?  0   How often have you found it difficult to postpone urination? 0   How often have you had a weak urinary stream?  0   How often have you had to push or strain to begin urination? 0   How many times did you most typically get up to urinate from the time you went to bed at night until the time you got up in the morning?   1   Quality of Life: If you were to spend the rest of your life with your urinary condition just the way it is now, how would you feel about that?  1   AUA SYMPTOM SCORE  3            Past Medical History  Past Medical History:   Diagnosis Date    Adenoma of prostate     last assessed: 10/20/2016,,prostate removed 11/2/2016    Anxiety     "occas"    Arthritis     "right shoulder"    Benign essential hypertension 6/28/2016    "pt denies HTN" states lisinopril is for diabetes protection    Blind left eye     "very minimal vision since birth"    Cancer [facility])     hx prostate cancer    Compression fracture of thoracic vertebra ([address] ) [address] crowns present     Diabetes mellitus ([facility] [address] )     Type II    Dry eyes     "very occas"    Erectile dysfunction     Hemorrhoids     High cholesterol     Iatrogenic hypotension     Incisional hernia     Syncope     PATIENT STATES THIS WAS DUE TO LOW BP WHEN TAKING CARDURA AND 20 MG OF LISINOPRIL    Ulcerative colitis ([address] )     "in remission over 30yrs"    Vitamin D deficiency     Wears glasses        Past Social History  Past Surgical History:   Procedure Laterality Date    COLONOSCOPY      EYE SURGERY      INCISIONAL HERNIA REPAIR N/A 4/19/2018    Procedure: REPAIR HERNIA INCISIONAL LAPAROSCOPIC/ROBOTIC WITH MESH;  Surgeon: Andreina Mccray MD;  Location: [facility];  Service: General    MN LAP,PROSTATECTOMY,RADICAL,W/NERVE SPARE,INCL ROBOTIC N/A 11/2/2016    Procedure: PROSTATECTOMY RADICAL W ROBOTICS -  BILATERAL PELVIC LYMPH NODE DISSECTION;  Surgeon: Shawna Fall MD;  Location: [facility];  Service: Urology    PROSTATE BIOPSY      onset: 9/16/2016     History   Smoking Status    Former Smoker    Quit date: 1982   Smokeless Tobacco    Former User    Types: Chew     Comment: Per allscripts - never a smoker and former smoker quit 35 years ago       Past Family History  Family History   Problem Relation Age of Onset    Diabetes Mother     Heart disease Father        Past Social history  Social History     Social History    Marital status: Single     Spouse name: N/A    Number of children: N/A    Years of education: N/A     Occupational History    Not on file       Social History Main Topics    Smoking status: Former Smoker     Quit date: 1982    Smokeless tobacco: Former User     Types: Chew      Comment: Per allscripts - never a smoker and former smoker quit 35 years ago    Alcohol use No    Drug use: No      Comment: recovering from drug addiction quit 1986    Sexual activity: No     Other Topics Concern    Not on file     Social History Narrative    No narrative on file       Current Medications  Current Outpatient Prescriptions   Medication Sig Dispense Refill    aspirin 325 mg tablet Take 1 tablet by mouth daily      Cholecalciferol (VITAMIN D3) 2000 units capsule Take 2,000 Units by mouth daily Takes 2-3 days per week       Flaxseed, Linseed, (FLAX SEED OIL) 1000 MG CAPS Take by mouth daily        glucose blood (ONE TOUCH ULTRA TEST) test strip 1 each by Other route 2 (two) times a day for 180 days E11 9 180 each 1    JARDIANCE 25 MG TABS TAKE 1 TABLET BY MOUTH EVERY DAY 90 tablet 1    Lancets (ONETOUCH ULTRASOFT) lancets by Does not apply route      lisinopril (ZESTRIL) 20 mg tablet TAKE 1 TABLET BY MOUTH EVERY DAY 90 tablet 0    LORazepam (ATIVAN) 1 mg tablet TAKE 1 TABLET BY MOUTH TWICE A DAY AS NEEDED 180 tablet 0    metFORMIN (GLUCOPHAGE) 1000 MG tablet TAKE 1 TABLET TWICE A  tablet 2    multivitamin (THERAGRAN) TABS Take 1 tablet by mouth daily      sildenafil (VIAGRA) 100 mg tablet Take 1 tablet (100 mg total) by mouth as needed for erectile dysfunction for up to 10 doses 1 hour prior to needing on an empty stomach 10 tablet 5    simvastatin (ZOCOR) 40 mg tablet TAKE 1 TABLET DAILY AT BEDTIME 90 tablet 1    sulfaSALAzine (AZULFIDINE) 500 mg tablet TAKE 3 TABLETS EVERY DAY (Patient taking differently: TAKE 1TABLETS EVERY DAY at hs) 270 tablet 0     No current facility-administered medications for this visit  Allergies  Allergies   Allergen Reactions    Azulfidine [Sulfasalazine] Hives and Fever     ORAL ONLY         The following portions of the patient's history were reviewed and updated as appropriate: allergies, current medications, past medical history, past social history, past surgical history and problem list       Vitals  Vitals:    11/02/18 1322   BP: 100/78   BP Location: Left arm   Patient Position: Sitting   Cuff Size: Adult   Pulse: 90   Weight: 75 9 kg (167 lb 6 4 oz)   Height: 6' 2" (1 88 m)         Physical Exam  Constitutional   General appearance: Patient is seated and in no acute distress, well appearing and well nourished  Head and Face   Head and face: Normal     Eyes   Conjunctiva and lids: No erythema, swelling or discharge  Ears, Nose, Mouth, and Throat   Hearing: Normal     Pulmonary   Respiratory effort: No increased work of breathing or signs of respiratory distress  Cardiovascular   Examination of extremities for edema and/or varicosities: Normal     Abdomen   Abdomen: Non-tender, no masses  LLQ incisional hernia, reducible  Musculoskeletal   Gait and station: Normal     Skin   Skin and subcutaneous tissue: Warm, dry, and intact  No visible lesions or rashes    Psychiatric   Judgment and insight: Normal  Recent and remote memory:  Normal  Mood and affect: Normal      Results  No results found for this or any previous visit (from the past 1 hour(s)) ]  Lab Results   Component Value Date    PSA <0 1 10/04/2018    PSA <0 1 06/27/2018    PSA <0 1 03/28/2018     Lab Results   Component Value Date    GLUCOSE 240 (H) 03/03/2015    CALCIUM 9 3 05/03/2018     03/03/2015    K 4 4 05/03/2018    CO2 29 05/03/2018    CL 98 (L) 05/03/2018    BUN 22 05/03/2018    CREATININE 0 89 05/03/2018     Lab Results   Component Value Date    WBC 6 58 05/03/2018    HGB 15 3 05/03/2018    HCT 47 1 05/03/2018    MCV 97 05/03/2018     05/03/2018           Orders  Orders Placed This Encounter   Procedures    PSA Total, Diagnostic     Standing Status:   Future     Standing Expiration Date:   11/2/2019 [[id number]],[[id number]]

## 2024-01-22 NOTE — PROGRESS NOTE ADULT - ASSESSMENT
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  Saw his urologist  was started on an antibioti. Patient also reports several episodes of severe intermittent left flank pain today, denies history of kidney stones to his knowledge.  Denies associated nausea, vomiting, diarrhea, dysuria, fever, chill  LDL 60  LE duplex with + B/L below knee DVT   outpatient hypercoag workup in past neg as part of stroke workup   1/19 plan for IR procedure today. still with hematuria     Impression:   1) chronic strokes, resid WFD  2) seizure d/o 2/2 storkes  3) ADHD  4) vertigo BPPV    -  IR for embolization  y 1/19 --> no active bleeding   - f/u     -  AC when restarted ; lovenox restarted  now BID   - for ADHD gets Vyvanse 50mg daily  - for seizure he is on vimpat 150 mg BID  - f/u heme/onc, new dvt was on lovenox ; now changed to BID dosing   - for secondary stroke prevention he is on asa 81mg daily and full dose lovenox .  - statin therapy with LDL goal < 70mg/dL; atorvastatin 20 at home   - f/u  for flank pain, hematuria and obstruction   - abx for UTI   - PT/OT   - meclizion prn for vertigo   - check FS, glucose control <180  - GI/DVT    - Thank you for allowing me to participate in the care of this patient. Call with questions.       Covering for Braxton Forde MD  Neurology  Office: [phone]

## 2024-01-22 NOTE — DISCHARGE NOTE PROVIDER - NSDCFUADDAPPT_GEN_ALL_CORE_FT
Follow up @ [facility] for Urology, Call for an appointment.  [address] [place] [address]  [phone]   Follow up @ [facility] for Urology, Call for an appointment.  [address] [place] [address]  [phone]    Hematology/Oncology [facility] [phone] (office)

## 2024-01-22 NOTE — PROGRESS NOTE ADULT - SUBJECTIVE AND OBJECTIVE BOX
Neurology        S: patient seen, resting in bed        MEDICATIONS:    aspirin enteric coated 81 milliGRAM(s) Oral daily  atorvastatin 20 milliGRAM(s) Oral at bedtime  enoxaparin Injectable 100 milliGRAM(s) SubCutaneous every 12 hours  gabapentin 300 milliGRAM(s) Oral two times a day  lacosamide 150 milliGRAM(s) Oral two times a day  multivitamin 1 Tablet(s) Oral daily  pantoprazole    Tablet 40 milliGRAM(s) Oral before breakfast  sodium chloride 0.9%. 1000 milliLiter(s) IV Continuous <Continuous>  tamsulosin 0.4 milliGRAM(s) Oral at bedtime      LABS:                          11.6   5.77  )-----------( 168      ( 22 Jan 2024 07:11 )             35.2     01-22    143  |  108  |  20  ----------------------------<  91  3.7   |  23  |  1.03    Ca    8.6      22 Jan 2024 07:08      CAPILLARY BLOOD GLUCOSE          Urinalysis Basic - ( 22 Jan 2024 07:08 )    Color: x / Appearance: x / SG: x / pH: x  Gluc: 91 mg/dL / Ketone: x  / Bili: x / Urobili: x   Blood: x / Protein: x / Nitrite: x   Leuk Esterase: x / RBC: x / WBC x   Sq Epi: x / Non Sq Epi: x / Bacteria: x      I&O's Summary    21 Jan 2024 07:01  -  22 Jan 2024 07:00  --------------------------------------------------------  IN: 1630 mL / OUT: 1560 mL / NET: 70 mL      Vital Signs Last 24 Hrs  T(C): 36.4 (22 Jan 2024 05:09), Max: 36.4 (21 Jan 2024 20:22)  T(F): 97.5 (22 Jan 2024 05:09), Max: 97.5 (21 Jan 2024 20:22)  HR: 58 (22 Jan 2024 05:09) (58 - 69)  BP: 106/72 (22 Jan 2024 05:09) (103/63 - 118/79)  BP(mean): --  RR: 18 (22 Jan 2024 05:09) (18 - 18)  SpO2: 98% (22 Jan 2024 05:09) (97% - 98%)    Parameters below as of 22 Jan 2024 05:09  Patient On (Oxygen Delivery Method): room air        General Exam:   General Appearance: Appropriately dressed and in no acute distress       Head: Normocephalic, atraumatic and no dysmorphic features  Ear, Nose, and Throat: Moist mucous membranes  CVS: S1S2+  Resp: No SOB, no wheeze or rhonchi  GI: soft NT/ND  Extremities: No edema or cyanosis  Skin: No bruises or rashes     Neurological Exam:  MS: Eyes open. Awake, alert, oriented to person, place, situation, time. Follows all commands. Attention/concentration, recent and remote memory, and fund of knowledge intact  Language: Speech is clear, fluent with good repetition & comprehension.  CNs: PERRL (R = 3mm, L = 3mm). VFF. EOMI No nystagmus.  No provocation on dizziness with head turning. V1-3 intact to LT b/l. No facial asymmetry b/l, full eye closure strength b/l. Hearing grossly normal (rubbing fingers) b/l. Tongue midline, normal movements, no atrophy. Palate with symmetric elevation. Trap 5/5 b/l  Motor: Normal muscle bulk & tone. No noticeable tremor. No pronator drift. No drift of UE or LE b/l.               Deltoid	Biceps	Triceps	   R	         5	      5	   5	 5	  		 	  L	         5	      5	   5	 5	  		    	H-Flex	K-Flex	K-Ext	D-Flex	P-Flex  R	    5	  5	   5	  5	    5		   L	    5	   5	   5	  4	    5		   *Some pain limitation on LLE.   Sensation: Intact to LT b/l throughout.   Cortical: Extinction on DSS (neglect): none  Reflexes:              Biceps(C5)       BR(C6)     Triceps(C7)               Patellar(L4)    Achilles(S1)    Plantar Resp  R	              3	          3	             3		 3		    3	Withdrawal       L	              3	          3	             3		 2		    1	Withdrawal    Coordination: No dysmetria to FTN/HTS b/l.   Gait: Deferred.     Data/Labs/Imaging which I personally reviewed.        LABS:    LABS:                          11.6   7.19  )-----------( 189      ( 20 Jan 2024 18:11 )             35.6     01-19    142  |  107  |  12  ----------------------------<  85  3.6   |  23  |  0.90    Ca    8.5      19 Jan 2024 06:39        PT/INR - ( 19 Jan 2024 06:39 )   PT: 11.2 sec;   INR: 1.07 ratio           Urinalysis Basic - ( 19 Jan 2024 06:39 )    Color: x / Appearance: x / SG: x / pH: x  Gluc: 85 mg/dL / Ketone: x  / Bili: x / Urobili: x   Blood: x / Protein: x / Nitrite: x   Leuk Esterase: x / RBC: x / WBC x   Sq Epi: x / Non Sq Epi: x / Bacteria: x

## 2024-01-22 NOTE — PROGRESS NOTE ADULT - SUBJECTIVE AND OBJECTIVE BOX
DATE OF SERVICE: 01-22-24 @ 17:02    Patient is a 53y old  Male who presents with a chief complaint of Hematuria (22 Jan 2024 14:01)      SUBJECTIVE / OVERNIGHT EVENTS:  No chest pain. No shortness of breath. No complaints. No events overnight.  no more hematuria    MEDICATIONS  (STANDING):  aspirin enteric coated 81 milliGRAM(s) Oral daily  atorvastatin 20 milliGRAM(s) Oral at bedtime  enoxaparin Injectable 100 milliGRAM(s) SubCutaneous every 12 hours  gabapentin 300 milliGRAM(s) Oral two times a day  lacosamide 150 milliGRAM(s) Oral two times a day  multivitamin 1 Tablet(s) Oral daily  pantoprazole    Tablet 40 milliGRAM(s) Oral before breakfast  tamsulosin 0.4 milliGRAM(s) Oral at bedtime    MEDICATIONS  (PRN):      Vital Signs Last 24 Hrs  T(C): 36.4 (22 Jan 2024 12:37), Max: 36.4 (21 Jan 2024 20:22)  T(F): 97.5 (22 Jan 2024 12:37), Max: 97.5 (21 Jan 2024 20:22)  HR: 66 (22 Jan 2024 12:37) (58 - 69)  BP: 101/64 (22 Jan 2024 12:37) (101/64 - 118/79)  BP(mean): --  RR: 18 (22 Jan 2024 12:37) (18 - 18)  SpO2: 98% (22 Jan 2024 12:37) (97% - 98%)    Parameters below as of 22 Jan 2024 12:37  Patient On (Oxygen Delivery Method): room air      CAPILLARY BLOOD GLUCOSE        I&O's Summary    21 Jan 2024 07:01  -  22 Jan 2024 07:00  --------------------------------------------------------  IN: 1630 mL / OUT: 1560 mL / NET: 70 mL    22 Jan 2024 07:01  -  22 Jan 2024 17:02  --------------------------------------------------------  IN: 720 mL / OUT: 900 mL / NET: -180 mL        PHYSICAL EXAM:  GENERAL: NAD, well-developed  HEAD:  Atraumatic, Normocephalic  EYES: EOMI, PERRLA, conjunctiva and sclera clear  NECK: Supple, No JVD  CHEST/LUNG: Clear to auscultation bilaterally; No wheeze  HEART: Regular rate and rhythm; No murmurs, rubs, or gallops  ABDOMEN: Soft, Nontender, Nondistended; Bowel sounds present  EXTREMITIES:  2+ Peripheral Pulses, No clubbing, cyanosis, or edema  PSYCH: AAOx3  NEUROLOGY: non-focal  SKIN: No rashes or lesions    LABS:                        11.6   5.77  )-----------( 168      ( 22 Jan 2024 07:11 )             35.2     01-22    143  |  108  |  20  ----------------------------<  91  3.7   |  23  |  1.03    Ca    8.6      22 Jan 2024 07:08            Urinalysis Basic - ( 22 Jan 2024 07:08 )    Color: x / Appearance: x / SG: x / pH: x  Gluc: 91 mg/dL / Ketone: x  / Bili: x / Urobili: x   Blood: x / Protein: x / Nitrite: x   Leuk Esterase: x / RBC: x / WBC x   Sq Epi: x / Non Sq Epi: x / Bacteria: x        RADIOLOGY & ADDITIONAL TESTS:    Imaging Personally Reviewed:    Consultant(s) Notes Reviewed:      Care Discussed with Consultants/Other Providers:

## 2024-01-22 NOTE — PROGRESS NOTE ADULT - SUBJECTIVE AND OBJECTIVE BOX
Interventional Radiology Follow-Up Note    This is a 53y Male s/p left renal angio on 1/20/24in Interventional Radiology with Dr. Ever Marks.     S: Patient seen and examined @ bedside. No complaints offered.     Medication:   aspirin enteric coated: (01-21)  enoxaparin Injectable: (01-22)    Vitals:   T(F): 97.5, Max: 97.5 (20:22)  HR: 66  BP: 101/64  RR: 18  SpO2: 98%    Physical Exam:  General: Nontoxic, in NAD, A&O x3.  Abdomen: soft, NTND, no peritoneal signs.  Extremities: Left wrist is clean, dry and intact, soft with no evidence of hematoma, +2 radial pulse. Access site is well healing without evidence of erythema, redness, irritation or hematoma.     LABS:  WBC 5.77 / Hgb 11.6 / Hct 35.2 / Plt 168  01-22    143  |  108  |  20  ----------------------------<  91  3.7   |  23  |  1.03    Ca    8.6      22 Jan 2024 07:08    Assessment/Plan:  53y Male admitted with persistent hematuria on AC for DVT    Pt most recently s/p left renal angiogram on 1/19/24 with Dr. Ever Marks.     -continue global management per primary team  -monitor h/h; transfuse as needed  -trend vs/labs  - IR will sign off at this time, please reconsult as needed.  - Greater than 50% of the encounter was spent counseling and/or coordination of care on drain management and follow up.      Please call IR at extension [phone] with any questions, concerns, or issues regarding above.

## 2024-01-22 NOTE — DISCHARGE NOTE NURSING/CASE MANAGEMENT/SOCIAL WORK - NSDCPEFALRISK_GEN_ALL_CORE
For information on Fall & Injury Prevention, visit: [email or web address] OR  [email or web address] OR  https://www.cdc.gov/steadi/patient.html

## 2024-01-22 NOTE — DISCHARGE NOTE PROVIDER - PROVIDER TOKENS
PROVIDER:[TOKEN:[[id number]],FOLLOWUP:[1 week]] PROVIDER:[TOKEN:[[id number]],FOLLOWUP:[2 weeks]],PROVIDER:[TOKEN:[[id number]],FOLLOWUP:[2 weeks],ESTABLISHEDPATIENT:[T]]

## 2024-01-22 NOTE — DISCHARGE NOTE NURSING/CASE MANAGEMENT/SOCIAL WORK - NSDCFUADDAPPT_GEN_ALL_CORE_FT
Follow up @ [facility], Call for an appointment.  [address] [place] [address]  [phone]    Hematology/Oncology [facility] [phone] (office)

## 2024-01-25 ENCOUNTER — OUTPATIENT (OUTPATIENT)
Dept: OUTPATIENT SERVICES | Facility: HOSPITAL | Age: 54
LOS: 1 days | End: 2024-01-25
Payer: MEDICAID

## 2024-01-25 VITALS
OXYGEN SATURATION: 98 % | HEART RATE: 85 BPM | HEIGHT: 75 IN | TEMPERATURE: 98 F | RESPIRATION RATE: 18 BRPM | WEIGHT: 212.08 LBS | SYSTOLIC BLOOD PRESSURE: 107 MMHG | DIASTOLIC BLOOD PRESSURE: 75 MMHG

## 2024-01-25 DIAGNOSIS — I63.9 CEREBRAL INFARCTION, UNSPECIFIED: ICD-10-CM

## 2024-01-25 DIAGNOSIS — Z98.890 OTHER SPECIFIED POSTPROCEDURAL STATES: Chronic | ICD-10-CM

## 2024-01-25 DIAGNOSIS — Z01.818 ENCOUNTER FOR OTHER PREPROCEDURAL EXAMINATION: ICD-10-CM

## 2024-01-25 DIAGNOSIS — K63.5 POLYP OF COLON: ICD-10-CM

## 2024-01-25 LAB
ANION GAP SERPL CALC-SCNC: 11 MMOL/L — SIGNIFICANT CHANGE UP (ref 5–17)
BUN SERPL-MCNC: 19 MG/DL — SIGNIFICANT CHANGE UP (ref 7–23)
CALCIUM SERPL-MCNC: 9.2 MG/DL — SIGNIFICANT CHANGE UP (ref 8.4–10.5)
CHLORIDE SERPL-SCNC: 106 MMOL/L — SIGNIFICANT CHANGE UP (ref 96–108)
CO2 SERPL-SCNC: 26 MMOL/L — SIGNIFICANT CHANGE UP (ref 22–31)
CREAT SERPL-MCNC: 0.97 MG/DL — SIGNIFICANT CHANGE UP (ref 0.5–1.3)
EGFR: 93 ML/MIN/1.73M2 — SIGNIFICANT CHANGE UP
GLUCOSE SERPL-MCNC: 94 MG/DL — SIGNIFICANT CHANGE UP (ref 70–99)
HCT VFR BLD CALC: 36.4 % — LOW (ref 39–50)
HGB BLD-MCNC: 11.9 G/DL — LOW (ref 13–17)
MCHC RBC-ENTMCNC: 30.1 PG — SIGNIFICANT CHANGE UP (ref 27–34)
MCHC RBC-ENTMCNC: 32.7 GM/DL — SIGNIFICANT CHANGE UP (ref 32–36)
MCV RBC AUTO: 92.2 FL — SIGNIFICANT CHANGE UP (ref 80–100)
NRBC # BLD: 0 /100 WBCS — SIGNIFICANT CHANGE UP (ref 0–0)
PLATELET # BLD AUTO: 194 K/UL — SIGNIFICANT CHANGE UP (ref 150–400)
POTASSIUM SERPL-MCNC: 4 MMOL/L — SIGNIFICANT CHANGE UP (ref 3.5–5.3)
POTASSIUM SERPL-SCNC: 4 MMOL/L — SIGNIFICANT CHANGE UP (ref 3.5–5.3)
RBC # BLD: 3.95 M/UL — LOW (ref 4.2–5.8)
RBC # FLD: 13.2 % — SIGNIFICANT CHANGE UP (ref 10.3–14.5)
SODIUM SERPL-SCNC: 143 MMOL/L — SIGNIFICANT CHANGE UP (ref 135–145)
WBC # BLD: 9.33 K/UL — SIGNIFICANT CHANGE UP (ref 3.8–10.5)
WBC # FLD AUTO: 9.33 K/UL — SIGNIFICANT CHANGE UP (ref 3.8–10.5)

## 2024-01-25 PROCEDURE — 80048 BASIC METABOLIC PNL TOTAL CA: CPT

## 2024-01-25 PROCEDURE — 36415 COLL VENOUS BLD VENIPUNCTURE: CPT

## 2024-01-25 PROCEDURE — 85027 COMPLETE CBC AUTOMATED: CPT

## 2024-01-25 PROCEDURE — G0463: CPT

## 2024-01-25 NOTE — H&P PST ADULT - MUSCULOSKELETAL
details… using rolling walker/ROM intact/no joint erythema/no joint warmth/no calf tenderness/abnormal gait

## 2024-01-25 NOTE — H&P PST ADULT - HISTORY OF PRESENT ILLNESS
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 2022), recent admission to [facility] for gross hematuria.   CT showed clot in left collecting system, repeat CT showed clot had cleared.  He underwent IR angio which was negative on 1/19.  Pt was discharged on 1/22/24.   Present to PST pre-procedure for colonoscopy/polypectomy under anesthesia on 1/31/24 with Dr. Maurer.  Patient denies dysuria, frequency or urgency, chills, body aches, nausea, vomiting, headache, lightheadedness or dizziness.               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 2022),  (+) loop recorder placed Feb 2022 after 1st CVA.   Pt had recent admission to [facility] for gross hematuria.  CT showed clot in left collecting system, repeat CT showed clot had cleared.  He underwent IR angio which was negative on 1/19.  Pt was discharged on 1/22/24.   Present to PST pre-procedure for colonoscopy/polypectomy under anesthesia on 1/31/24 with Dr. Maurer.  Patient denies dysuria, frequency or urgency, chills, body aches, nausea, vomiting, headache, lightheadedness or dizziness.

## 2024-01-25 NOTE — H&P PST ADULT - LAST STRESS TEST
Denies  CT coronaries 5/2023 Nonobstructive CAD. No evidence of atrial septal defects or anatomic cardiac congenital abnormalities.

## 2024-01-25 NOTE — H&P PST ADULT - ASSESSMENT
DASI score:  DASI activity:  Loose teeth or dentures:  Airway:  DASI score: 7.25   DASI activity: Walks long distances, lifts weights, ADLs  Loose teeth or dentures:  Denies   Airway:  MP 1

## 2024-01-25 NOTE — H&P PST ADULT - PROBLEM SELECTOR PLAN 1
Plan for colonoscopy/polypectomy under anesthesia on 1/31/24 with Dr. Maurer.   PST labs sent per protocol  Pre procedure surgical scrub instructions discussed  Pre-op education provided - all questions answered Plan for colonoscopy/polypectomy under anesthesia on 1/31/24 with Dr. Maurer.   PST labs sent  Pre procedure instructions discussed  Pre-op education provided - all questions answered

## 2024-01-25 NOTE — H&P PST ADULT - NSICDXPASTSURGICALHX_GEN_ALL_CORE_FT
PAST SURGICAL HISTORY:  H/O arthroscopy of knee     History of fasciotomy     S/P excision of neuroma      PAST SURGICAL HISTORY:  H/O arthroscopy of knee     History of fasciotomy     History of loop recorder     S/P excision of neuroma

## 2024-01-25 NOTE — H&P PST ADULT - BMI (KG/M2)
Pt came to the office today to have labs drawn as ordered by Dr. Radha Diaz. Light green and lavender tubes drawn with straight needle. Pt tolerated well.
26.5

## 2024-01-25 NOTE — H&P PST ADULT - NSICDXPASTMEDICALHX_GEN_ALL_CORE_FT
PAST MEDICAL HISTORY:  Colon polyp     CVA (cerebrovascular accident)     Expressive aphasia     History of TIAs     HLD (hyperlipidemia)     Seizure disorder     Vertigo      PAST MEDICAL HISTORY:  Colon polyp     CVA (cerebrovascular accident)     History of TIAs     HLD (hyperlipidemia)     Seizure disorder     Vertigo

## 2024-01-29 ENCOUNTER — APPOINTMENT (OUTPATIENT)
Dept: ORTHOPEDIC SURGERY | Facility: CLINIC | Age: 54
End: 2024-01-29
Payer: MEDICAID

## 2024-01-29 VITALS
TEMPERATURE: 97.6 F | WEIGHT: 210 LBS | RESPIRATION RATE: 16 BRPM | DIASTOLIC BLOOD PRESSURE: 88 MMHG | SYSTOLIC BLOOD PRESSURE: 130 MMHG | BODY MASS INDEX: 26.11 KG/M2 | HEART RATE: 98 BPM | OXYGEN SATURATION: 98 % | HEIGHT: 75 IN

## 2024-01-29 DIAGNOSIS — M77.8 OTHER ENTHESOPATHIES, NOT ELSEWHERE CLASSIFIED: ICD-10-CM

## 2024-01-29 DIAGNOSIS — Z87.39 PERSONAL HISTORY OF OTHER DISEASES OF THE MUSCULOSKELETAL SYSTEM AND CONNECTIVE TISSUE: ICD-10-CM

## 2024-01-29 DIAGNOSIS — M72.2 PLANTAR FASCIAL FIBROMATOSIS: ICD-10-CM

## 2024-01-29 PROCEDURE — 99203 OFFICE O/P NEW LOW 30 MIN: CPT

## 2024-01-31 ENCOUNTER — APPOINTMENT (OUTPATIENT)
Dept: GASTROENTEROLOGY | Facility: HOSPITAL | Age: 54
End: 2024-01-31

## 2024-01-31 ENCOUNTER — OUTPATIENT (OUTPATIENT)
Dept: OUTPATIENT SERVICES | Facility: HOSPITAL | Age: 54
LOS: 1 days | End: 2024-01-31
Payer: MEDICAID

## 2024-01-31 VITALS
WEIGHT: 210.1 LBS | SYSTOLIC BLOOD PRESSURE: 110 MMHG | RESPIRATION RATE: 18 BRPM | OXYGEN SATURATION: 100 % | DIASTOLIC BLOOD PRESSURE: 77 MMHG | HEIGHT: 75 IN | HEART RATE: 75 BPM | TEMPERATURE: 97 F

## 2024-01-31 VITALS
SYSTOLIC BLOOD PRESSURE: 110 MMHG | WEIGHT: 210.1 LBS | RESPIRATION RATE: 18 BRPM | OXYGEN SATURATION: 100 % | DIASTOLIC BLOOD PRESSURE: 77 MMHG | TEMPERATURE: 97 F | HEART RATE: 75 BPM | HEIGHT: 75 IN

## 2024-01-31 DIAGNOSIS — K63.5 POLYP OF COLON: ICD-10-CM

## 2024-01-31 DIAGNOSIS — Z98.890 OTHER SPECIFIED POSTPROCEDURAL STATES: Chronic | ICD-10-CM

## 2024-01-31 PROCEDURE — 45378 DIAGNOSTIC COLONOSCOPY: CPT | Mod: 74

## 2024-01-31 DEVICE — NET RETRV ROT ROTH 2.5MMX230CM: Type: IMPLANTABLE DEVICE | Status: FUNCTIONAL

## 2024-01-31 RX ORDER — TAMSULOSIN HYDROCHLORIDE 0.4 MG/1
1 CAPSULE ORAL
Refills: 0 | DISCHARGE

## 2024-01-31 RX ORDER — SODIUM CHLORIDE 9 MG/ML
500 INJECTION INTRAMUSCULAR; INTRAVENOUS; SUBCUTANEOUS
Refills: 0 | Status: COMPLETED | OUTPATIENT
Start: 2024-01-31 | End: 2024-01-31

## 2024-01-31 RX ADMIN — SODIUM CHLORIDE 30 MILLILITER(S): 9 INJECTION INTRAMUSCULAR; INTRAVENOUS; SUBCUTANEOUS at 09:15

## 2024-01-31 NOTE — PRE PROCEDURE NOTE - PRE PROCEDURE EVALUATION
Attending Physician:   Erasto                         Procedure:  Colonoscopy / EMR    Indication for Procedure:  Large colon polyp  ________________________________________________________  PAST MEDICAL & SURGICAL HISTORY:  History of TIAs      CVA (cerebrovascular accident)      HLD (hyperlipidemia)      Vertigo      Colon polyp      Seizure disorder      History of fasciotomy      H/O arthroscopy of knee      S/P excision of neuroma      History of loop recorder        ALLERGIES:  No Known Allergies    HOME MEDICATIONS:  aspirin 81 mg oral delayed release tablet: 1 tab(s) orally once a day  atorvastatin 20 mg oral tablet: 1 tab(s) orally once a day (at bedtime)  gabapentin 300 mg oral capsule: 1 cap(s) orally 2 times a day  lacosamide 150 mg oral tablet: 1 tab(s) orally 2 times a day  Multiple Vitamins oral tablet: 1 tab(s) orally once a day  Vyvanse 50 mg oral capsule: 1 cap(s) orally once a day (at bedtime)    AICD/PPM: [ ] yes   [x ] no    PERTINENT LAB DATA:                      PHYSICAL EXAMINATION:    Height (cm): 190.5  Weight (kg): 95.3  BMI (kg/m2): 26.3  BSA (m2): 2.24T(C): 36.3  HR: 75  BP: 110/77  RR: 18  SpO2: 100%    Constitutional: NAD  HEENT: anciteric   Neck:  No JVD  Respiratory: CTAB/L  Cardiovascular: S1 and S2  Gastrointestinal: BS+, soft, NT/ND  Extremities: No peripheral edema  Neurological: No confusion  Psychiatric: Normal mood, normal affect  Skin: No jaundice    ASA Class: I [ ]  II [ ]  III [x ]  IV [ ]    COMMENTS:    The patient is a suitable candidate for the planned procedure unless box checked [ ]  No, explain:

## 2024-01-31 NOTE — ASU PATIENT PROFILE, ADULT - NSICDXPASTSURGICALHX_GEN_ALL_CORE_FT
PAST SURGICAL HISTORY:  H/O arthroscopy of knee     History of fasciotomy     History of loop recorder     S/P excision of neuroma

## 2024-01-31 NOTE — ASU PATIENT PROFILE, ADULT - FALL HARM RISK - HARM RISK INTERVENTIONS

## 2024-01-31 NOTE — ASU PATIENT PROFILE, ADULT - NSICDXPASTMEDICALHX_GEN_ALL_CORE_FT
PAST MEDICAL HISTORY:  Colon polyp     CVA (cerebrovascular accident)     History of TIAs     HLD (hyperlipidemia)     Seizure disorder     Vertigo

## 2024-02-22 NOTE — ED ADULT NURSE NOTE - NS ED NURSE IV DC DT
[Alert] : alert [Well Nourished] : well nourished [Healthy Appearance] : healthy appearance [No Acute Distress] : no acute distress [Well Developed] : well developed [Normal Voice/Communication] : normal voice communication [Normal Pupil & Iris Size/Symmetry] : normal pupil and iris size and symmetry [No Discharge] : no discharge [Sclera Not Icteric] : sclera not icteric [Normal Nasal Mucosa] : the nasal mucosa was normal [Normal Lips/Tongue] : the lips and tongue were normal [Normal Outer Ear/Nose] : the ears and nose were normal in appearance [Supple] : the neck was supple [Normal Rate and Effort] : normal respiratory rhythm and effort [No Crackles] : no crackles [No Retractions] : no retractions [Bilateral Audible Breath Sounds] : bilateral audible breath sounds [Normal Rate] : heart rate was normal  [No murmur] : no murmur [Normal S1, S2] : normal S1 and S2 [Regular Rhythm] : with a regular rhythm [No Rash] : no rash [Skin Intact] : skin intact  [No Skin Lesions] : no skin lesions [Normal Mood] : mood was normal [Normal Affect] : affect was normal [Alert, Awake, Oriented as Age-Appropriate] : alert, awake, oriented as age appropriate [Normal TMs] : both tympanic membranes were normal [Pale mucosa] : pale mucosa [Boggy Nasal Turbinates] : boggy and/or pale nasal turbinates 13-Sep-2022 14:52

## 2024-02-25 DIAGNOSIS — Q21.10 ATRIAL SEPTAL DEFECT, UNSPECIFIED: ICD-10-CM

## 2024-02-26 ENCOUNTER — APPOINTMENT (OUTPATIENT)
Dept: UROLOGY | Facility: CLINIC | Age: 54
End: 2024-02-26
Payer: MEDICAID

## 2024-02-26 DIAGNOSIS — R31.0 GROSS HEMATURIA: ICD-10-CM

## 2024-02-26 DIAGNOSIS — Z87.898 PERSONAL HISTORY OF OTHER SPECIFIED CONDITIONS: ICD-10-CM

## 2024-02-26 DIAGNOSIS — M79.A9: ICD-10-CM

## 2024-02-26 DIAGNOSIS — M62.462 CONTRACTURE OF MUSCLE, LEFT LOWER LEG: ICD-10-CM

## 2024-02-26 PROCEDURE — G2211 COMPLEX E/M VISIT ADD ON: CPT | Mod: NC,1L

## 2024-02-26 PROCEDURE — 99214 OFFICE O/P EST MOD 30 MIN: CPT

## 2024-02-27 NOTE — LETTER BODY
[FreeTextEntry1] : Alex GRIFFITH MD [facility] [address],  [place] [address] [phone]  Dear Dr. Johnson,  Reason for visit: Gross hematuria.   This is a 53 year old man with gross hematuria. Patient is here for further evaluation. Patient was hospitalized with his hematuria. Patient has history of PFO and CVA and chronic anticoagulation. Patient underwent CT urogram demonstrated possible thickening of the pelvis. Subsequent evaluation included renal IR angiography which demonstrated no evidence of aneurysm. Patient denies any interval complaints or difficulties. He denies any dysuria or gross hematuria. Patient denies any changes in health. The past medical history and family history and social history are unchanged. All other review of systems are negative. Patient denies any changes in medications. Medication list was reconciled.   On examination, the patient is in no acute distress. He is alert and oriented follows commands. He has normal mood and affect. He is normocephalic. Oral no thrush. Neck is supple. Respirations are unlabored. His abdomen is soft and nontender. Liver is nonpalpable. Bladder is nonpalpable. No CVA tenderness. Neurologically he is grossly intact. No peripheral edema. Skin without gross abnormality.   I personally reviewed the CT scan with patient today. CT scan demonstrated abnormal left renal pelvis on urogram.   Assessment: Gross hematuria.  Abnormal CT urogram.   I counseled the patient.  In terms of hematuria, he will repeat urinalysis and urine culture to look for infections. I recommended patient undergo cystoscopy to evaluate his urinary outlet. I also recommended patient obtain MRI urogram to evaluate the renal pelvis abnormality found on CT urogram. I counseled the patient regarding the procedures. The risks and benefits were discussed. Alternatives were given. I answered the patient questions. The patient will take the necessary preparations for the procedures. The patient will follow-up as directed and will contact me with any questions or concerns. Thank you for the opportunity to participate in the care of this patient. I'll keep you updated on his progress.   Plan: Urinalysis. Urine Culture. Cystoscopy. MRI urogram. Follow up as directed.

## 2024-02-27 NOTE — HISTORY OF PRESENT ILLNESS
[FreeTextEntry1] : Follow-up gross hematuria.  Patient was hospitalized with his hematuria.  Patient history of CVA and chronic anticoagulation.  Patient underwent CT urogram demonstrated possible thickening of the pelvis.  Subsequent evaluation included a IR angiography which demonstrated no evidence of aneurysm..  I counseled the patient manage area.  Repeat urinalysis.  Cystoscopy.  MRI urogram.  Please refer to URO Consult note

## 2024-02-27 NOTE — ADDENDUM
[FreeTextEntry1] : Entered by Swapna Shahid, acting as scribe for Dr. Doni Walker. The documentation recorded by the scribe accurately reflects the service I personally performed and the decisions made by me.

## 2024-02-28 ENCOUNTER — APPOINTMENT (OUTPATIENT)
Dept: DERMATOLOGY | Facility: CLINIC | Age: 54
End: 2024-02-28
Payer: MEDICAID

## 2024-02-28 DIAGNOSIS — R22.9 LOCALIZED SWELLING, MASS AND LUMP, UNSPECIFIED: ICD-10-CM

## 2024-02-28 DIAGNOSIS — L30.8 OTHER SPECIFIED DERMATITIS: ICD-10-CM

## 2024-02-28 PROCEDURE — 99214 OFFICE O/P EST MOD 30 MIN: CPT

## 2024-02-29 DIAGNOSIS — E78.5 HYPERLIPIDEMIA, UNSPECIFIED: ICD-10-CM

## 2024-02-29 DIAGNOSIS — R97.20 ELEVATED PROSTATE, SPECIFIC ANTIGEN [PSA]: ICD-10-CM

## 2024-02-29 LAB
ANION GAP SERPL CALC-SCNC: 13 MMOL/L
BACTERIA UR CULT: NORMAL
BUN SERPL-MCNC: 15 MG/DL
CALCIUM SERPL-MCNC: 9.1 MG/DL
CHLORIDE SERPL-SCNC: 104 MMOL/L
CO2 SERPL-SCNC: 24 MMOL/L
CREAT SERPL-MCNC: 0.92 MG/DL
EGFR: 99 ML/MIN/1.73M2
GLUCOSE SERPL-MCNC: 99 MG/DL
POTASSIUM SERPL-SCNC: 4.4 MMOL/L
PSA FREE FLD-MCNC: 15 %
PSA FREE SERPL-MCNC: 0.72 NG/ML
PSA SERPL-MCNC: 4.71 NG/ML
SODIUM SERPL-SCNC: 140 MMOL/L

## 2024-03-04 ENCOUNTER — RX RENEWAL (OUTPATIENT)
Age: 54
End: 2024-03-04

## 2024-04-04 ENCOUNTER — INPATIENT (INPATIENT)
Facility: HOSPITAL | Age: 54
LOS: 22 days | Discharge: NURSING, REHAB OR LONG-TERM CARE | DRG: 556 | End: 2024-04-27
Attending: INTERNAL MEDICINE | Admitting: INTERNAL MEDICINE
Payer: MEDICAID

## 2024-04-04 ENCOUNTER — TRANSCRIPTION ENCOUNTER (OUTPATIENT)
Age: 54
End: 2024-04-04

## 2024-04-04 VITALS
RESPIRATION RATE: 16 BRPM | HEART RATE: 94 BPM | DIASTOLIC BLOOD PRESSURE: 98 MMHG | OXYGEN SATURATION: 100 % | HEIGHT: 75 IN | WEIGHT: 210.1 LBS | SYSTOLIC BLOOD PRESSURE: 147 MMHG | TEMPERATURE: 98 F

## 2024-04-04 DIAGNOSIS — M79.81 NONTRAUMATIC HEMATOMA OF SOFT TISSUE: ICD-10-CM

## 2024-04-04 DIAGNOSIS — Z98.890 OTHER SPECIFIED POSTPROCEDURAL STATES: Chronic | ICD-10-CM

## 2024-04-04 LAB
ALBUMIN SERPL ELPH-MCNC: 3.8 G/DL — SIGNIFICANT CHANGE UP (ref 3.3–5)
ALBUMIN SERPL ELPH-MCNC: 4.2 G/DL — SIGNIFICANT CHANGE UP (ref 3.3–5)
ALP SERPL-CCNC: 75 U/L — SIGNIFICANT CHANGE UP (ref 40–120)
ALP SERPL-CCNC: 85 U/L — SIGNIFICANT CHANGE UP (ref 40–120)
ALT FLD-CCNC: 25 U/L — SIGNIFICANT CHANGE UP (ref 10–45)
ALT FLD-CCNC: 28 U/L — SIGNIFICANT CHANGE UP (ref 10–45)
ANION GAP SERPL CALC-SCNC: 12 MMOL/L — SIGNIFICANT CHANGE UP (ref 5–17)
ANION GAP SERPL CALC-SCNC: 12 MMOL/L — SIGNIFICANT CHANGE UP (ref 5–17)
APTT BLD: 33.7 SEC — SIGNIFICANT CHANGE UP (ref 24.5–35.6)
APTT BLD: 48.4 SEC — HIGH (ref 24.5–35.6)
AST SERPL-CCNC: 23 U/L — SIGNIFICANT CHANGE UP (ref 10–40)
AST SERPL-CCNC: 24 U/L — SIGNIFICANT CHANGE UP (ref 10–40)
BASOPHILS # BLD AUTO: 0.04 K/UL — SIGNIFICANT CHANGE UP (ref 0–0.2)
BASOPHILS # BLD AUTO: 0.09 K/UL — SIGNIFICANT CHANGE UP (ref 0–0.2)
BASOPHILS NFR BLD AUTO: 0.3 % — SIGNIFICANT CHANGE UP (ref 0–2)
BASOPHILS NFR BLD AUTO: 1 % — SIGNIFICANT CHANGE UP (ref 0–2)
BILIRUB SERPL-MCNC: 0.2 MG/DL — SIGNIFICANT CHANGE UP (ref 0.2–1.2)
BILIRUB SERPL-MCNC: 0.9 MG/DL — SIGNIFICANT CHANGE UP (ref 0.2–1.2)
BLD GP AB SCN SERPL QL: NEGATIVE — SIGNIFICANT CHANGE UP
BUN SERPL-MCNC: 16 MG/DL — SIGNIFICANT CHANGE UP (ref 7–23)
BUN SERPL-MCNC: 16 MG/DL — SIGNIFICANT CHANGE UP (ref 7–23)
CALCIUM SERPL-MCNC: 8.8 MG/DL — SIGNIFICANT CHANGE UP (ref 8.4–10.5)
CALCIUM SERPL-MCNC: 9.4 MG/DL — SIGNIFICANT CHANGE UP (ref 8.4–10.5)
CHLORIDE SERPL-SCNC: 102 MMOL/L — SIGNIFICANT CHANGE UP (ref 96–108)
CHLORIDE SERPL-SCNC: 103 MMOL/L — SIGNIFICANT CHANGE UP (ref 96–108)
CK SERPL-CCNC: 149 U/L — SIGNIFICANT CHANGE UP (ref 30–200)
CK SERPL-CCNC: 75 U/L — SIGNIFICANT CHANGE UP (ref 30–200)
CO2 SERPL-SCNC: 23 MMOL/L — SIGNIFICANT CHANGE UP (ref 22–31)
CO2 SERPL-SCNC: 25 MMOL/L — SIGNIFICANT CHANGE UP (ref 22–31)
CREAT SERPL-MCNC: 0.92 MG/DL — SIGNIFICANT CHANGE UP (ref 0.5–1.3)
CREAT SERPL-MCNC: 0.94 MG/DL — SIGNIFICANT CHANGE UP (ref 0.5–1.3)
EGFR: 97 ML/MIN/1.73M2 — SIGNIFICANT CHANGE UP
EGFR: 99 ML/MIN/1.73M2 — SIGNIFICANT CHANGE UP
EOSINOPHIL # BLD AUTO: 0.03 K/UL — SIGNIFICANT CHANGE UP (ref 0–0.5)
EOSINOPHIL # BLD AUTO: 0.47 K/UL — SIGNIFICANT CHANGE UP (ref 0–0.5)
EOSINOPHIL NFR BLD AUTO: 0.2 % — SIGNIFICANT CHANGE UP (ref 0–6)
EOSINOPHIL NFR BLD AUTO: 5.2 % — SIGNIFICANT CHANGE UP (ref 0–6)
GLUCOSE SERPL-MCNC: 100 MG/DL — HIGH (ref 70–99)
GLUCOSE SERPL-MCNC: 145 MG/DL — HIGH (ref 70–99)
HCT VFR BLD CALC: 31 % — LOW (ref 39–50)
HCT VFR BLD CALC: 31.8 % — LOW (ref 39–50)
HCT VFR BLD CALC: 35.9 % — LOW (ref 39–50)
HCT VFR BLD CALC: 36 % — LOW (ref 39–50)
HCT VFR BLD CALC: 36.7 % — LOW (ref 39–50)
HCT VFR BLD CALC: 38.1 % — LOW (ref 39–50)
HGB BLD-MCNC: 10.3 G/DL — LOW (ref 13–17)
HGB BLD-MCNC: 11 G/DL — LOW (ref 13–17)
HGB BLD-MCNC: 12.1 G/DL — LOW (ref 13–17)
HGB BLD-MCNC: 12.2 G/DL — LOW (ref 13–17)
HGB BLD-MCNC: 12.4 G/DL — LOW (ref 13–17)
HGB BLD-MCNC: 12.6 G/DL — LOW (ref 13–17)
IMM GRANULOCYTES NFR BLD AUTO: 0.5 % — SIGNIFICANT CHANGE UP (ref 0–0.9)
IMM GRANULOCYTES NFR BLD AUTO: 0.9 % — SIGNIFICANT CHANGE UP (ref 0–0.9)
INR BLD: 0.98 RATIO — SIGNIFICANT CHANGE UP (ref 0.85–1.18)
INR BLD: 1.14 RATIO — SIGNIFICANT CHANGE UP (ref 0.85–1.18)
LYMPHOCYTES # BLD AUTO: 0.62 K/UL — LOW (ref 1–3.3)
LYMPHOCYTES # BLD AUTO: 1.57 K/UL — SIGNIFICANT CHANGE UP (ref 1–3.3)
LYMPHOCYTES # BLD AUTO: 17.2 % — SIGNIFICANT CHANGE UP (ref 13–44)
LYMPHOCYTES # BLD AUTO: 5 % — LOW (ref 13–44)
MAGNESIUM SERPL-MCNC: 1.9 MG/DL — SIGNIFICANT CHANGE UP (ref 1.6–2.6)
MCHC RBC-ENTMCNC: 29.2 PG — SIGNIFICANT CHANGE UP (ref 27–34)
MCHC RBC-ENTMCNC: 29.2 PG — SIGNIFICANT CHANGE UP (ref 27–34)
MCHC RBC-ENTMCNC: 29.3 PG — SIGNIFICANT CHANGE UP (ref 27–34)
MCHC RBC-ENTMCNC: 29.6 PG — SIGNIFICANT CHANGE UP (ref 27–34)
MCHC RBC-ENTMCNC: 29.7 PG — SIGNIFICANT CHANGE UP (ref 27–34)
MCHC RBC-ENTMCNC: 30 PG — SIGNIFICANT CHANGE UP (ref 27–34)
MCHC RBC-ENTMCNC: 33.1 GM/DL — SIGNIFICANT CHANGE UP (ref 32–36)
MCHC RBC-ENTMCNC: 33.2 GM/DL — SIGNIFICANT CHANGE UP (ref 32–36)
MCHC RBC-ENTMCNC: 33.2 GM/DL — SIGNIFICANT CHANGE UP (ref 32–36)
MCHC RBC-ENTMCNC: 33.7 GM/DL — SIGNIFICANT CHANGE UP (ref 32–36)
MCHC RBC-ENTMCNC: 34.4 GM/DL — SIGNIFICANT CHANGE UP (ref 32–36)
MCHC RBC-ENTMCNC: 34.6 GM/DL — SIGNIFICANT CHANGE UP (ref 32–36)
MCV RBC AUTO: 85.9 FL — SIGNIFICANT CHANGE UP (ref 80–100)
MCV RBC AUTO: 86.5 FL — SIGNIFICANT CHANGE UP (ref 80–100)
MCV RBC AUTO: 87 FL — SIGNIFICANT CHANGE UP (ref 80–100)
MCV RBC AUTO: 87.8 FL — SIGNIFICANT CHANGE UP (ref 80–100)
MCV RBC AUTO: 88 FL — SIGNIFICANT CHANGE UP (ref 80–100)
MCV RBC AUTO: 89.6 FL — SIGNIFICANT CHANGE UP (ref 80–100)
MONOCYTES # BLD AUTO: 0.83 K/UL — SIGNIFICANT CHANGE UP (ref 0–0.9)
MONOCYTES # BLD AUTO: 0.86 K/UL — SIGNIFICANT CHANGE UP (ref 0–0.9)
MONOCYTES NFR BLD AUTO: 6.8 % — SIGNIFICANT CHANGE UP (ref 2–14)
MONOCYTES NFR BLD AUTO: 9.4 % — SIGNIFICANT CHANGE UP (ref 2–14)
NEUTROPHILS # BLD AUTO: 10.65 K/UL — HIGH (ref 1.8–7.4)
NEUTROPHILS # BLD AUTO: 6.08 K/UL — SIGNIFICANT CHANGE UP (ref 1.8–7.4)
NEUTROPHILS NFR BLD AUTO: 66.7 % — SIGNIFICANT CHANGE UP (ref 43–77)
NEUTROPHILS NFR BLD AUTO: 86.8 % — HIGH (ref 43–77)
NRBC # BLD: 0 /100 WBCS — SIGNIFICANT CHANGE UP (ref 0–0)
PHOSPHATE SERPL-MCNC: 4.1 MG/DL — SIGNIFICANT CHANGE UP (ref 2.5–4.5)
PLATELET # BLD AUTO: 170 K/UL — SIGNIFICANT CHANGE UP (ref 150–400)
PLATELET # BLD AUTO: 185 K/UL — SIGNIFICANT CHANGE UP (ref 150–400)
PLATELET # BLD AUTO: 186 K/UL — SIGNIFICANT CHANGE UP (ref 150–400)
PLATELET # BLD AUTO: 189 K/UL — SIGNIFICANT CHANGE UP (ref 150–400)
PLATELET # BLD AUTO: 194 K/UL — SIGNIFICANT CHANGE UP (ref 150–400)
PLATELET # BLD AUTO: 211 K/UL — SIGNIFICANT CHANGE UP (ref 150–400)
POTASSIUM SERPL-MCNC: 3.8 MMOL/L — SIGNIFICANT CHANGE UP (ref 3.5–5.3)
POTASSIUM SERPL-MCNC: 4.8 MMOL/L — SIGNIFICANT CHANGE UP (ref 3.5–5.3)
POTASSIUM SERPL-SCNC: 3.8 MMOL/L — SIGNIFICANT CHANGE UP (ref 3.5–5.3)
POTASSIUM SERPL-SCNC: 4.8 MMOL/L — SIGNIFICANT CHANGE UP (ref 3.5–5.3)
PROT SERPL-MCNC: 6.7 G/DL — SIGNIFICANT CHANGE UP (ref 6–8.3)
PROT SERPL-MCNC: 7.3 G/DL — SIGNIFICANT CHANGE UP (ref 6–8.3)
PROTHROM AB SERPL-ACNC: 10.8 SEC — SIGNIFICANT CHANGE UP (ref 9.5–13)
PROTHROM AB SERPL-ACNC: 11.9 SEC — SIGNIFICANT CHANGE UP (ref 9.5–13)
RBC # BLD: 3.53 M/UL — LOW (ref 4.2–5.8)
RBC # BLD: 3.7 M/UL — LOW (ref 4.2–5.8)
RBC # BLD: 4.14 M/UL — LOW (ref 4.2–5.8)
RBC # BLD: 4.15 M/UL — LOW (ref 4.2–5.8)
RBC # BLD: 4.17 M/UL — LOW (ref 4.2–5.8)
RBC # BLD: 4.25 M/UL — SIGNIFICANT CHANGE UP (ref 4.2–5.8)
RBC # FLD: 13 % — SIGNIFICANT CHANGE UP (ref 10.3–14.5)
RBC # FLD: 13.6 % — SIGNIFICANT CHANGE UP (ref 10.3–14.5)
RBC # FLD: 14 % — SIGNIFICANT CHANGE UP (ref 10.3–14.5)
RBC # FLD: 14.1 % — SIGNIFICANT CHANGE UP (ref 10.3–14.5)
RBC # FLD: 14.1 % — SIGNIFICANT CHANGE UP (ref 10.3–14.5)
RBC # FLD: 14.4 % — SIGNIFICANT CHANGE UP (ref 10.3–14.5)
RH IG SCN BLD-IMP: NEGATIVE — SIGNIFICANT CHANGE UP
SODIUM SERPL-SCNC: 137 MMOL/L — SIGNIFICANT CHANGE UP (ref 135–145)
SODIUM SERPL-SCNC: 140 MMOL/L — SIGNIFICANT CHANGE UP (ref 135–145)
WBC # BLD: 10.14 K/UL — SIGNIFICANT CHANGE UP (ref 3.8–10.5)
WBC # BLD: 10.93 K/UL — HIGH (ref 3.8–10.5)
WBC # BLD: 12.25 K/UL — HIGH (ref 3.8–10.5)
WBC # BLD: 12.28 K/UL — HIGH (ref 3.8–10.5)
WBC # BLD: 16.29 K/UL — HIGH (ref 3.8–10.5)
WBC # BLD: 9.12 K/UL — SIGNIFICANT CHANGE UP (ref 3.8–10.5)
WBC # FLD AUTO: 10.14 K/UL — SIGNIFICANT CHANGE UP (ref 3.8–10.5)
WBC # FLD AUTO: 10.93 K/UL — HIGH (ref 3.8–10.5)
WBC # FLD AUTO: 12.25 K/UL — HIGH (ref 3.8–10.5)
WBC # FLD AUTO: 12.28 K/UL — HIGH (ref 3.8–10.5)
WBC # FLD AUTO: 16.29 K/UL — HIGH (ref 3.8–10.5)
WBC # FLD AUTO: 9.12 K/UL — SIGNIFICANT CHANGE UP (ref 3.8–10.5)

## 2024-04-04 PROCEDURE — 99223 1ST HOSP IP/OBS HIGH 75: CPT | Mod: GC

## 2024-04-04 PROCEDURE — 73701 CT LOWER EXTREMITY W/DYE: CPT | Mod: 26,59,LT,MC

## 2024-04-04 PROCEDURE — 99285 EMERGENCY DEPT VISIT HI MDM: CPT

## 2024-04-04 PROCEDURE — 99223 1ST HOSP IP/OBS HIGH 75: CPT

## 2024-04-04 PROCEDURE — 74177 CT ABD & PELVIS W/CONTRAST: CPT | Mod: 26,MC

## 2024-04-04 PROCEDURE — 99221 1ST HOSP IP/OBS SF/LOW 40: CPT

## 2024-04-04 PROCEDURE — 73706 CT ANGIO LWR EXTR W/O&W/DYE: CPT | Mod: 26,LT,MC

## 2024-04-04 RX ORDER — ATORVASTATIN CALCIUM 80 MG/1
1 TABLET, FILM COATED ORAL
Refills: 0 | DISCHARGE

## 2024-04-04 RX ORDER — FENTANYL CITRATE 50 UG/ML
25 INJECTION INTRAVENOUS ONCE
Refills: 0 | Status: DISCONTINUED | OUTPATIENT
Start: 2024-04-04 | End: 2024-04-04

## 2024-04-04 RX ORDER — FENTANYL CITRATE 50 UG/ML
50 INJECTION INTRAVENOUS ONCE
Refills: 0 | Status: DISCONTINUED | OUTPATIENT
Start: 2024-04-04 | End: 2024-04-04

## 2024-04-04 RX ORDER — GABAPENTIN 400 MG/1
300 CAPSULE ORAL EVERY 8 HOURS
Refills: 0 | Status: DISCONTINUED | OUTPATIENT
Start: 2024-04-04 | End: 2024-04-19

## 2024-04-04 RX ORDER — TAMSULOSIN HYDROCHLORIDE 0.4 MG/1
0.4 CAPSULE ORAL AT BEDTIME
Refills: 0 | Status: DISCONTINUED | OUTPATIENT
Start: 2024-04-04 | End: 2024-04-19

## 2024-04-04 RX ORDER — HYDROMORPHONE HYDROCHLORIDE 2 MG/ML
0.25 INJECTION INTRAMUSCULAR; INTRAVENOUS; SUBCUTANEOUS
Refills: 0 | Status: DISCONTINUED | OUTPATIENT
Start: 2024-04-04 | End: 2024-04-11

## 2024-04-04 RX ORDER — LACOSAMIDE 50 MG/1
150 TABLET ORAL
Refills: 0 | Status: DISCONTINUED | OUTPATIENT
Start: 2024-04-04 | End: 2024-04-19

## 2024-04-04 RX ORDER — POLYETHYLENE GLYCOL 3350 17 G/17G
17 POWDER, FOR SOLUTION ORAL DAILY
Refills: 0 | Status: DISCONTINUED | OUTPATIENT
Start: 2024-04-04 | End: 2024-04-19

## 2024-04-04 RX ORDER — HYDROMORPHONE HYDROCHLORIDE 2 MG/ML
1 INJECTION INTRAMUSCULAR; INTRAVENOUS; SUBCUTANEOUS ONCE
Refills: 0 | Status: DISCONTINUED | OUTPATIENT
Start: 2024-04-04 | End: 2024-04-04

## 2024-04-04 RX ORDER — MORPHINE SULFATE 50 MG/1
4 CAPSULE, EXTENDED RELEASE ORAL ONCE
Refills: 0 | Status: DISCONTINUED | OUTPATIENT
Start: 2024-04-04 | End: 2024-04-04

## 2024-04-04 RX ORDER — HYDROMORPHONE HYDROCHLORIDE 2 MG/ML
12 INJECTION INTRAMUSCULAR; INTRAVENOUS; SUBCUTANEOUS EVERY 4 HOURS
Refills: 0 | Status: DISCONTINUED | OUTPATIENT
Start: 2024-04-04 | End: 2024-04-04

## 2024-04-04 RX ORDER — ATORVASTATIN CALCIUM 80 MG/1
20 TABLET, FILM COATED ORAL AT BEDTIME
Refills: 0 | Status: DISCONTINUED | OUTPATIENT
Start: 2024-04-04 | End: 2024-04-19

## 2024-04-04 RX ORDER — HYDROMORPHONE HYDROCHLORIDE 2 MG/ML
8 INJECTION INTRAMUSCULAR; INTRAVENOUS; SUBCUTANEOUS EVERY 4 HOURS
Refills: 0 | Status: DISCONTINUED | OUTPATIENT
Start: 2024-04-04 | End: 2024-04-04

## 2024-04-04 RX ORDER — LISDEXAMFETAMINE DIMESYLATE 70 MG/1
1 CAPSULE ORAL
Refills: 0 | DISCHARGE

## 2024-04-04 RX ORDER — PROTAMINE SULFATE 10 MG/ML
50 AMPUL (ML) INTRAVENOUS ONCE
Refills: 0 | Status: COMPLETED | OUTPATIENT
Start: 2024-04-04 | End: 2024-04-04

## 2024-04-04 RX ORDER — ATORVASTATIN CALCIUM 80 MG/1
10 TABLET, FILM COATED ORAL AT BEDTIME
Refills: 0 | Status: DISCONTINUED | OUTPATIENT
Start: 2024-04-04 | End: 2024-04-04

## 2024-04-04 RX ORDER — GABAPENTIN 400 MG/1
1 CAPSULE ORAL
Refills: 0 | DISCHARGE

## 2024-04-04 RX ORDER — PROTHROMBIN COMPLEX CONCENTRATE (HUMAN) 25.5; 16.5; 24; 22; 22; 26 [IU]/ML; [IU]/ML; [IU]/ML; [IU]/ML; [IU]/ML; [IU]/ML
2500 POWDER, FOR SOLUTION INTRAVENOUS ONCE
Refills: 0 | Status: COMPLETED | OUTPATIENT
Start: 2024-04-04 | End: 2024-04-04

## 2024-04-04 RX ORDER — HYDROMORPHONE HYDROCHLORIDE 2 MG/ML
0.25 INJECTION INTRAMUSCULAR; INTRAVENOUS; SUBCUTANEOUS
Refills: 0 | Status: DISCONTINUED | OUTPATIENT
Start: 2024-04-04 | End: 2024-04-04

## 2024-04-04 RX ORDER — ACETAMINOPHEN 500 MG
1000 TABLET ORAL ONCE
Refills: 0 | Status: COMPLETED | OUTPATIENT
Start: 2024-04-04

## 2024-04-04 RX ORDER — HYDROMORPHONE HYDROCHLORIDE 2 MG/ML
1 INJECTION INTRAMUSCULAR; INTRAVENOUS; SUBCUTANEOUS EVERY 4 HOURS
Refills: 0 | Status: DISCONTINUED | OUTPATIENT
Start: 2024-04-04 | End: 2024-04-04

## 2024-04-04 RX ORDER — HYDROMORPHONE HYDROCHLORIDE 2 MG/ML
0.5 INJECTION INTRAMUSCULAR; INTRAVENOUS; SUBCUTANEOUS EVERY 4 HOURS
Refills: 0 | Status: DISCONTINUED | OUTPATIENT
Start: 2024-04-04 | End: 2024-04-04

## 2024-04-04 RX ORDER — SENNA PLUS 8.6 MG/1
2 TABLET ORAL AT BEDTIME
Refills: 0 | Status: DISCONTINUED | OUTPATIENT
Start: 2024-04-04 | End: 2024-04-19

## 2024-04-04 RX ORDER — HYDROMORPHONE HYDROCHLORIDE 2 MG/ML
0.5 INJECTION INTRAMUSCULAR; INTRAVENOUS; SUBCUTANEOUS EVERY 4 HOURS
Refills: 0 | Status: DISCONTINUED | OUTPATIENT
Start: 2024-04-04 | End: 2024-04-11

## 2024-04-04 RX ORDER — GABAPENTIN 400 MG/1
100 CAPSULE ORAL EVERY 8 HOURS
Refills: 0 | Status: DISCONTINUED | OUTPATIENT
Start: 2024-04-04 | End: 2024-04-04

## 2024-04-04 RX ADMIN — HYDROMORPHONE HYDROCHLORIDE 0.5 MILLIGRAM(S): 2 INJECTION INTRAMUSCULAR; INTRAVENOUS; SUBCUTANEOUS at 15:30

## 2024-04-04 RX ADMIN — HYDROMORPHONE HYDROCHLORIDE 0.5 MILLIGRAM(S): 2 INJECTION INTRAMUSCULAR; INTRAVENOUS; SUBCUTANEOUS at 15:09

## 2024-04-04 RX ADMIN — LACOSAMIDE 150 MILLIGRAM(S): 50 TABLET ORAL at 17:00

## 2024-04-04 RX ADMIN — FENTANYL CITRATE 50 MICROGRAM(S): 50 INJECTION INTRAVENOUS at 02:00

## 2024-04-04 RX ADMIN — GABAPENTIN 300 MILLIGRAM(S): 400 CAPSULE ORAL at 20:59

## 2024-04-04 RX ADMIN — ATORVASTATIN CALCIUM 20 MILLIGRAM(S): 80 TABLET, FILM COATED ORAL at 20:59

## 2024-04-04 RX ADMIN — HYDROMORPHONE HYDROCHLORIDE 1 MILLIGRAM(S): 2 INJECTION INTRAMUSCULAR; INTRAVENOUS; SUBCUTANEOUS at 11:37

## 2024-04-04 RX ADMIN — PROTHROMBIN COMPLEX CONCENTRATE (HUMAN) 400 INTERNATIONAL UNIT(S): 25.5; 16.5; 24; 22; 22; 26 POWDER, FOR SOLUTION INTRAVENOUS at 12:39

## 2024-04-04 RX ADMIN — Medication 330 MILLIGRAM(S): at 02:08

## 2024-04-04 RX ADMIN — FENTANYL CITRATE 25 MICROGRAM(S): 50 INJECTION INTRAVENOUS at 04:11

## 2024-04-04 RX ADMIN — GABAPENTIN 300 MILLIGRAM(S): 400 CAPSULE ORAL at 13:20

## 2024-04-04 RX ADMIN — FENTANYL CITRATE 25 MICROGRAM(S): 50 INJECTION INTRAVENOUS at 07:36

## 2024-04-04 RX ADMIN — Medication 1 TABLET(S): at 11:38

## 2024-04-04 RX ADMIN — TAMSULOSIN HYDROCHLORIDE 0.4 MILLIGRAM(S): 0.4 CAPSULE ORAL at 20:59

## 2024-04-04 RX ADMIN — FENTANYL CITRATE 25 MICROGRAM(S): 50 INJECTION INTRAVENOUS at 05:31

## 2024-04-04 RX ADMIN — MORPHINE SULFATE 4 MILLIGRAM(S): 50 CAPSULE, EXTENDED RELEASE ORAL at 01:09

## 2024-04-04 RX ADMIN — HYDROMORPHONE HYDROCHLORIDE 1 MILLIGRAM(S): 2 INJECTION INTRAMUSCULAR; INTRAVENOUS; SUBCUTANEOUS at 10:55

## 2024-04-04 RX ADMIN — FENTANYL CITRATE 25 MICROGRAM(S): 50 INJECTION INTRAVENOUS at 08:05

## 2024-04-04 RX ADMIN — HYDROMORPHONE HYDROCHLORIDE 1 MILLIGRAM(S): 2 INJECTION INTRAMUSCULAR; INTRAVENOUS; SUBCUTANEOUS at 12:00

## 2024-04-04 RX ADMIN — SENNA PLUS 2 TABLET(S): 8.6 TABLET ORAL at 21:00

## 2024-04-04 NOTE — ED ADULT NURSE REASSESSMENT NOTE - NS ED NURSE REASSESS COMMENT FT1
Patient reporting feeling sweaty, lightheaded, and dizzy. Patient A&Ox4, hypotensive, pale and cool to touch, sinus braxton on the monitor - MD Silverman at bedside - 2 units of PRBC ordered. Crash cart at bedside with defib pads on patient.

## 2024-04-04 NOTE — ED PROVIDER NOTE - PROGRESS NOTE DETAILS
Attending Dr. Ramirez: Spoke with radiology and there is concern for active extravasation into hematoma. Spoke with surgery, will see patient shortly. Patient remains HD stable, pain only slightly improved. Will give meds and reassess. Patient updated on results. Attending Dr. Ramirez: Patient seen by surgery, compression dressing placed. Shortly after called to bedside by nurse as patient was feeling faint. On exam, appeared pale and diaphoretic. Repeat blood pressure systolic 50s and also bradycardic into the 50s. Patient otherwise alert and oriented but fatigued. Emergent release blood 2 units to be given. 500 cc bolus while awaiting blood. Surgery aware. Recommending resuscitation for now and possible IR intervention. Repeat blood pressure now systolic 90s and patient reports feeling somewhat better. IR consulted. Shine Kaur PGY2:  Will order CTA lower extremity to assess if extrav is venous or arterial. Attending Dr. Ramirez: CTA with expansion in hematoma and now with new gracillis intramuscular hematoma. Patient remains hemodynamically stable. Spoke with surgery for SICU evaluation for hemorrhage watch. IR resident reconsulted as well and will look through images with the attending. Attending Dr. Ramirez: Attempted to call vascular fellow for escalation of care as this patient has an actively extravasating hematoma with a history of compartment syndrome. No answer. Spoke with surgery resident who has been trying to contact as well. Will call once more and then escalate to the attending. Attending Dr. Ramirez: Attempted to call vascular fellow for escalation of care as this patient has an actively extravasating hematoma with a history of compartment syndrome. No answer. Spoke with surgery resident who has been trying to contact as well. Will call once more and then escalate to the attending. Awaiting IR call back. Attending Dr. Ramirez: Attempted to call vascular fellow for escalation of care as this patient has an actively extravasating hematoma with a history of compartment syndrome. No answer. Spoke with surgery resident who has been trying to contact as well. Will call once more and then escalate to the attending. Awaiting IR call back. Patient reassessed and updated on results and next steps. Reports pain is improving but still present. Surrounding the focal swelling compartments remain soft. +DP pulses. Attending Dr. Ramirez: Surgery resident at bedside. On further discussion with patient he has been injecting the Lovenox into his thigh which explains the small foci of air. Recommending medical admission for IR intervention. Vascular to follow. Shine Kaur PGY2:  Spoke with Dr. Ma and discussed Pt's presenting, CTA findings, Pt receiving 2 units of blood. Currently hemodynamically stable. They accepted pt for admission to their service. Attending Dr. Ramirez: Patient seen by IR resident and plan for intervention. She is in agreement that patient would be more appropriate in an ICU for hemorrhage watch. Spoke with SICU attending and will need vascular attending to accept. Spoke with Dr. Shin on for vascular and presented patient. He will discuss with SICU but since this will be an IR procedure and not operative, recommended MICU consult. MICU consulted and will evaluate patient and discuss with vascular. Dr. Swanson:  Seen by MICU who consulted ortho to eval for compartment syndrome. Ortho at bedside currently. Pt accepted to MICU.

## 2024-04-04 NOTE — PATIENT PROFILE ADULT - FALL HARM RISK - HARM RISK INTERVENTIONS

## 2024-04-04 NOTE — ED ADULT TRIAGE NOTE - CHIEF COMPLAINT QUOTE
Keep up the good work!    Physical activity:   -continue weightlifting 4 days a week    Food:  -keep up great job packing lunches and limiting eating out  -try to add a snack after school before weight lifting (protein snack like jerky stick, string cheese, hummus + veggie)    Medication  -start topiramate-Start one tab, 25 mg, for a week.  Increase  to 50 mg (2 tabs) for the next week.  At the third week, take 3 tabs (75 mg).  Stay at 3 tabs until you are seen again.  -continue phentermine 30mg        Topiramate (Topamax )  What is it used for?  Topiramate helps patients feel full more quickly and feel less hungry.  It may also help patients binge eat less often.  Topiramate may help you stick to a healthy diet, though used alone, it will not cause weight loss.  Although topiramate is not currently approved by the FDA for weight management, it is used commonly in weight management clinics for this purpose.  Just how topiramate helps with weight loss has not been exactly determined. However it seems to work on areas of the brain to quiet down signals related to eating.      Topiramate may help you:    >feel less interest in eating in between meals   >think less about food and eating   >find it easier to push the plate away   >find giving up pop easier    >have an easier time eating less    For some of our patients, the pills work right away. They feel and think quite differently about food. Other patients don't feel much of a change but find, in fact, they have lost weight! Like all weight loss medications, topiramate works best when you help it work.  This means:   >have less tempting high calorie (fattening) food around the house    >have lower calorie food (fruits, vegetables, low fat meats and dairy) for   snacks    >eat out only one time or less each week.   >eat your meals at a table with the TV or computer off.      How does it work?  Topiramate is a medication that was originally developed to treat seizures  in children and migraine headaches in adults.  It affects chemical messengers in the brain, but the exact way it works to decrease weight is unknown.    How should I take this medication?  Start one tab, 25 mg, for a week.  Increase  to 50 mg (2 tabs) for the next week.  At the third week, take 3 tabs (75 mg).  Stay at 3 tabs until you are seen again. Call the nurse at [phone] if you have any questions or concerns.   Is topiramate safe?  Most people tolerate topiramate with no problems.  Please tell your doctor if you have a history of kidney stones, if you are taking phenytoin or birth control pills, or if you are pregnant.  Topiramate is harmful in pregnancy.  Topiramate can decrease your ability to tolerate hot weather.  You should be sure to drink plenty of water to prevent dehydration and kidney stones.  What are the side effects?  Call your doctor right away if you notice any of these side effects:    Change in mood, especially thoughts of suicide    Rash     Pain in your flanks (side and back) or groin  If you notice these less serious side effects, talk with your doctor:    Numbness or tingling in hands and feet    Nausea    Mental fogginess, trouble concentrating, memory problems    Diarrhea    One of the dangers of topiramate is the possibility of birth defects--if you get pregnant when you are taking topiramate, there is the risk that your baby will be born with a cleft lip or palate.  If you are on topiramate and of child bearing age, you need to be on a reliable form of birth control or refrain from sexual intercourse.     Important note:  Topiramate may decrease the effectiveness of birth control pills.   sudden onset swelling left thigh

## 2024-04-04 NOTE — PROGRESS NOTE ADULT - SUBJECTIVE AND OBJECTIVE BOX
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 Otherwise, there was no trauma to the area nor falls. The pain is described as throbbing and does not radiate. He did not try any supportive measures and came to the hospital directly. Of note, the patient was on eliquis when he was found to have bilateral DVTs, however this was changed to lovenox when he had a CVA while on eliquis.    On admission, the patient's vitals were noted to be within normal limits, however he then became hypotensive for which he received 2u pRBCs and protamine sulfate. His labs were significant for anemia and leukocytosis and a CTA showed large hematoma in the LLE and small hematoma of the RLE. He is now admitted to the MICU for further management.    (04 Apr 2024 09:05)    04-04-24 @ 12:59  PAST MEDICAL & SURGICAL HISTORY:  History of TIAs      CVA (cerebrovascular accident)      HLD (hyperlipidemia)      Vertigo      Colon polyp      Seizure disorder      History of fasciotomy      H/O arthroscopy of knee      S/P excision of neuroma      History of loop recorder          Review of Systems:   CONSTITUTIONAL: No fever, weight loss, or fatigue  EYES: No eye pain, visual disturbances, or discharge  ENMT:  No difficulty hearing, tinnitus, vertigo; No sinus or throat pain  NECK: No pain or stiffness  BREASTS: No pain, masses, or nipple discharge  RESPIRATORY: No cough, wheezing, chills or hemoptysis; No shortness of breath  CARDIOVASCULAR: No chest pain, palpitations, dizziness, or leg swelling  GASTROINTESTINAL: No abdominal or epigastric pain. No nausea, vomiting, or hematemesis; No diarrhea or constipation. No melena or hematochezia.  GENITOURINARY: No dysuria, frequency, hematuria, or incontinence  NEUROLOGICAL: No headaches, memory loss, loss of strength, numbness, or tremors  SKIN: No itching, burning, rashes, or lesions   LYMPH NODES: No enlarged glands  ENDOCRINE: No heat or cold intolerance; No hair loss  MUSCULOSKELETAL: No joint pain or swelling; No muscle, back, left lower extremity pain  PSYCHIATRIC: No depression, anxiety, mood swings, or difficulty sleeping  HEME/LYMPH: No easy bruising, or bleeding gums  ALLERY AND IMMUNOLOGIC: No hives or eczema    Allergies    No Known Allergies    Intolerances        Social History:     FAMILY HISTORY:  No pertinent family history in first degree relatives        MEDICATIONS  (STANDING):  atorvastatin 20 milliGRAM(s) Oral at bedtime  gabapentin 300 milliGRAM(s) Oral every 8 hours  lacosamide 150 milliGRAM(s) Oral two times a day  multivitamin 1 Tablet(s) Oral daily  polyethylene glycol 3350 17 Gram(s) Oral daily  senna 2 Tablet(s) Oral at bedtime  tamsulosin 0.4 milliGRAM(s) Oral at bedtime    MEDICATIONS  (PRN):  HYDROmorphone  Injectable 1 milliGRAM(s) IV Push every 4 hours PRN Severe Pain (7 - 10)  HYDROmorphone  Injectable 0.5 milliGRAM(s) IV Push every 4 hours PRN Moderate Pain (4 - 6)      Vital Signs Last 24 Hrs  T(C): 36.8 (04 Apr 2024 12:00), Max: 36.8 (04 Apr 2024 01:05)  T(F): 98.3 (04 Apr 2024 12:00), Max: 98.3 (04 Apr 2024 01:05)  HR: 93 (04 Apr 2024 12:00) (51 - 94)  BP: 120/83 (04 Apr 2024 12:00) (54/51 - 147/98)  BP(mean): 97 (04 Apr 2024 12:00) (45 - 104)  RR: 15 (04 Apr 2024 12:00) (10 - 20)  SpO2: 94% (04 Apr 2024 12:00) (94% - 100%)    Parameters below as of 04 Apr 2024 10:50  Patient On (Oxygen Delivery Method): room air      CAPILLARY BLOOD GLUCOSE        I&O's Summary      PHYSICAL EXAM:  GENERAL: NAD, well-developed  HEAD:  Atraumatic, Normocephalic  EYES: EOMI, PERRLA, conjunctiva and sclera clear  NECK: Supple, No JVD  CHEST/LUNG: Clear to auscultation bilaterally; No wheeze  HEART: Regular rate and rhythm; No murmurs, rubs, or gallops  ABDOMEN: Soft, Nontender, Nondistended; Bowel sounds present  EXTREMITIES:  2+ Peripheral Pulses, No clubbing, cyanosis, or edema, left thigh swelling and tenderness  PSYCH: AAOx3  NEUROLOGY: non-focal  SKIN: No rashes or lesions    LABS:                        12.2   10.93 )-----------( 189      ( 04 Apr 2024 11:02 )             36.7     04-04    137  |  102  |  16  ----------------------------<  145<H>  4.8   |  23  |  0.94    Ca    8.8      04 Apr 2024 11:02  Phos  4.1     04-04  Mg     1.9     04-04    TPro  6.7  /  Alb  3.8  /  TBili  0.9  /  DBili  x   /  AST  24  /  ALT  25  /  AlkPhos  75  04-04    PT/INR - ( 04 Apr 2024 11:02 )   PT: 11.9 sec;   INR: 1.14 ratio         PTT - ( 04 Apr 2024 11:02 )  PTT:33.7 sec  CARDIAC MARKERS ( 04 Apr 2024 11:02 )  x     / x     / 149 U/L / x     / x          Urinalysis Basic - ( 04 Apr 2024 11:02 )    Color: x / Appearance: x / SG: x / pH: x  Gluc: 145 mg/dL / Ketone: x  / Bili: x / Urobili: x   Blood: x / Protein: x / Nitrite: x   Leuk Esterase: x / RBC: x / WBC x   Sq Epi: x / Non Sq Epi: x / Bacteria: x        RADIOLOGY & ADDITIONAL TESTS:    Imaging Personally Reviewed:    Consultant(s) Notes Reviewed:      Care Discussed with Consultants/Other Providers:

## 2024-04-04 NOTE — ED ADULT NURSE NOTE - OBJECTIVE STATEMENT
Patient is a 53 year old male complaining of swelling of left thigh. Patient reports sudden onset swelling to the left thigh started tonight - patient denies trauma to the site. Patient is on Lovenox SQ daily. On assessment patient is A&Ox4, breathing comfortably on room air, no accessory muscle use, no cough, chest rise and fall equal, no JVD, abdomen soft nontender, skin warm, swelling noted to left thigh with discoloration. 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 2022),  (+) loop recorder placed Feb 2022 after 1st CVA. Patient denies headache, dizziness, chest pain, palpitations, cough, SOB, abdominal pain, n/v/d, urinary symptoms, fevers, chills, weakness at this time.

## 2024-04-04 NOTE — H&P ADULT - NSHPREVIEWOFSYSTEMS_GEN_ALL_CORE
Review of Systems:  Constitutional: No fever, No weight loss, good appetite/po intake  Head: No headache   Eyes: No blurry vision, No diplopia  Neuro: No tremors, No muscle weakness   Cardiovascular: No chest pain, No palpitations  Respiratory: No SOB, No cough  GI: No nausea, No vomiting, No diarrhea  : No dysuria, No hematuria  Skin: No rash  MSK: + LLE swelling and bruising done

## 2024-04-04 NOTE — CONSULT NOTE ADULT - ATTENDING COMMENTS
need to monitor closely  if continued bleeding, expansion, drop in h/h, needs IR eval/embo    need to monitor for skin changes, which would warrant hematoma evacuation

## 2024-04-04 NOTE — ED PROVIDER NOTE - ATTENDING CONTRIBUTION TO CARE
I, Dr. Giana Ramirez, have personally performed a face to face medical and diagnostic evaluation of the patient. I have discussed with and reviewed the Resident's and/or ACP's and/or Medical/PA/NP student's note and agree with the History, ROS, Physical Exam and MDM unless otherwise indicated. A brief summary of my personal evaluation and impression can be found below.     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 Noticed within the last hour. No falls or trauma to the area. Administered Lovenox just prior to arrival. Denies fevers, chills, nausea, vomiting, urinary symptoms. No recent surgeries.     PE: L medial thigh with large swelling, indurated, no active expansion, +DP pulse, abdomen soft/nontender, clear lungs, normal heart sounds.     MDM: Patient on Lovenox for DVT presenting with spontaneous L thigh swelling. Patient brought to CT immediately and found to have a large hematoma with concern for active extravasation. Surgery aware. Will reverse Lovenox and compression dress while awaiting surgical recs.

## 2024-04-04 NOTE — CONSULT NOTE ADULT - ASSESSMENT
A 53 year old male with complex medical history on Lovenox 150 qd with PSHx of fasciotomy 2023 with Dr. Smith presents with lower extremity pain. He was found to have a hematoma on the medial left thigh. The patient is hemodynamically stable with Hgb 12 g/dL and intact motor and sensory functions in the lower extremity.    Recommendations:  - CBC q6 to monitor for H&H levels.  - If Hemoglobin continues to downtrend or the hematoma expands would get CTA  - ACE wrap the lower extremity  - Pain management PRN    Vascular Surgery  [id number] A 53 year old male with complex medical history on Lovenox 150 qd with PSHx of fasciotomy 2023 with Dr. Smith presents with lower extremity pain. He was found to have a hematoma on the medial left thigh. The patient is hemodynamically stable with Hgb 12 g/dL and intact motor and sensory functions in the lower extremity. CTA demonstrating active subcutaneous and gracilis hematomas, large in size compared to CT performed 4 hours prior.      Recommendations:  - CBC q6 to monitor for H&H levels.  - ACE wrap the lower extremity  - Recommend IR consultation for possible embolization       Vascular Surgery  [id number]

## 2024-04-04 NOTE — ED PROVIDER NOTE - PHYSICAL EXAMINATION
Shine Kaur PGY2:  GENERAL: Not in acute distress, non-toxic appearing  HEAD: normocephalic, atraumatic  HEENT: EOMI, normal conjunctiva, oral mucosa moist, neck supple  CARDIAC: appears well perfused  PULM: normal work of breathing  GI: abdomen nondistended  NEURO: alert and oriented x 3, normal speech, no focal motor or sensory deficits, gait normal, no gross neurologic deficit  MSK: + large area of swelling to the left medial thigh with induration, + significant tenderness to area, left lower extremity slightly cooler to touch compared to right, unable to palpate DP pulse in the LLE, scars to the left anterior shin secondary to fasciotomy    SKIN: + numerous areas of ecchymosis to the abd, bilat upper thigh secondary to lovenox shots, dry, well-perfused  PSYCH: appropriate mood and affect

## 2024-04-04 NOTE — H&P ADULT - NSHPLABSRESULTS_GEN_ALL_CORE
LABS: When present labs, imaging, and ECG were personally reviewed                          12.4   12.28 )-----------( 170      ( 04 Apr 2024 06:28 )             36.0       04-04    140  |  103  |  16  ----------------------------<  100<H>  3.8   |  25  |  0.92    Ca    9.4      04 Apr 2024 01:17    TPro  7.3  /  Alb  4.2  /  TBili  0.2  /  DBili  x   /  AST  23  /  ALT  28  /  AlkPhos  85  04-04       LIVER FUNCTIONS - ( 04 Apr 2024 01:17 )  Alb: 4.2 g/dL / Pro: 7.3 g/dL / ALK PHOS: 85 U/L / ALT: 28 U/L / AST: 23 U/L / GGT: x                    Urinalysis Basic - ( 04 Apr 2024 01:17 )    Color: x / Appearance: x / SG: x / pH: x  Gluc: 100 mg/dL / Ketone: x  / Bili: x / Urobili: x   Blood: x / Protein: x / Nitrite: x   Leuk Esterase: x / RBC: x / WBC x   Sq Epi: x / Non Sq Epi: x / Bacteria: x        PT/INR - ( 04 Apr 2024 01:17 )   PT: 10.8 sec;   INR: 0.98 ratio         PTT - ( 04 Apr 2024 01:17 )  PTT:48.4 sec    Lactate Trend            CAPILLARY BLOOD GLUCOSE                RADIOLOGY & ADDITIONAL TESTS:     1. Interval increase in size of an anteromedial left upper thigh   subcutaneous hematoma with scattered foci of high attenuation within the   hematoma compatible with active hemorrhage.  2. New left gracilis intramuscular hematoma which could be extending from   the subcutaneous hematoma.

## 2024-04-04 NOTE — ED PROVIDER NOTE - CARE PLAN
Principal Discharge DX:	Nontraumatic hematoma of skin and subcutaneous tissue  Secondary Diagnosis:	Nontraumatic intramuscular hematoma   1 Principal Discharge DX:	Nontraumatic hematoma of skin and subcutaneous tissue  Secondary Diagnosis:	Nontraumatic intramuscular hematoma  Secondary Diagnosis:	Medication induced coagulopathy

## 2024-04-04 NOTE — ED PROVIDER NOTE - DATE/TIME 1
04-Apr-2024 01:46 Back pain    GERD (gastroesophageal reflux disease)    High cholesterol    HTN (hypertension)    Insomnia    Prostate cancer

## 2024-04-04 NOTE — CONSULT NOTE ADULT - SUBJECTIVE AND OBJECTIVE BOX
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 Patient reports he has been injecting lovenox to left thigh (last dose last night). In ED patient hypotensive and bradycardic, emergent release blood 2 units given. with response in hemodynamics. CBC drawn after transfusion h/h 12.6/38.1. Patient reports left thigh pain with numbness and tingling down leg. Vascular following. IR consulted for evaluation of angiogram/ embolization.     Patient seen and examined in ED.     Allergies: No Known Allergies    Medications (Abx/Cardiac/Anticoagulation/Blood Products)  protamine  IVPB: 330 mL/Hr IV Intermittent (04-04 @ 02:08)    Data:  190.5  95.3  T(C): 36.8  HR: 74  BP: 112/74  RR: 17  SpO2: 100%    Gen: NAD, pale, A&Ox3  Abd: ND, soft, nttp  Extremities: Left inner thigh with large hematoma and ecchymosis. mid thigh with ace wrap additional ace wrap applied higher up on thigh. Reports difference in sensation in comparison to right leg (Prior to additional ace wrap being applied). +2 b/l DP/PT pulses. No discoloration in extremities. Equal tactile temp b/l, Cool temp of b/l feet but feet were exposed to air. Motor intact.     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 Patient reports he has been injecting lovenox to left thigh (last dose last night). In ED patient hypotensive and bradycardic, emergent release blood 2 units given with response in hemodynamics. CBC drawn after transfusion h/h 12.6/38.1. Vascular following. IR consulted for evaluation of angiogram/ embolization.     -Consult in progress, will review with IR attending.   -Repeat cbc stable 12.4/36 from 12.6/38.1  -keep left thigh with compression wrap  -Keep patient NPO  -Monitor hemodynamics closely   -Continue to hold a/c  -Maintain active T&S  -Case discussed with ED team and vascular team. per discussion with vascular no ICU consult was obtained given patient stable hemodynamics.   -Please contact IR at [phone] with any questions/ concerns regarding above.    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 Patient reports he has been injecting lovenox to left thigh (last dose last night). In ED patient hypotensive and bradycardic, emergent release blood 2 units given with response in hemodynamics. CBC drawn after transfusion h/h 12.6/38.1. Protamine 50mg given. Patient reports left thigh pain with numbness and tingling down leg. Vascular following. IR consulted for evaluation of angiogram/ embolization.     Patient seen and examined in ED.     Allergies: No Known Allergies    Medications (Abx/Cardiac/Anticoagulation/Blood Products)  protamine  IVPB: 330 mL/Hr IV Intermittent (04-04 @ 02:08)    Data:  190.5  95.3  T(C): 36.8  HR: 74  BP: 112/74  RR: 17  SpO2: 100%    Gen: NAD, pale, A&Ox3  Abd: ND, soft, nttp  Extremities: Left inner thigh with large hematoma and ecchymosis. mid thigh with ace wrap additional ace wrap applied higher up on thigh. Reports difference in sensation in comparison to right leg (Prior to additional ace wrap being applied). +2 b/l DP/PT pulses. No discoloration in extremities. Equal tactile temp b/l, Cool temp of b/l feet but feet were exposed to air. Motor intact.     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 Patient reports he has been injecting lovenox to left thigh (last dose last night). In ED patient hypotensive and bradycardic, emergent release blood 2 units given with response in hemodynamics. CBC drawn after transfusion h/h 12.6/38.1. Vascular following. IR consulted for evaluation of angiogram/ embolization.     -Consult in progress, will review with IR attending.   -s/p 2 U PRBC and protamine. Repeat cbc stable 12.4/36 from 12.6/38.1  -keep left thigh with compression wrap  -Keep patient NPO  -Monitor hemodynamics closely   -Continue to hold a/c  -Maintain active T&S  -Case discussed with ED team and vascular team. per discussion with vascular no ICU consult was obtained given patient stable hemodynamics.   -Please contact IR at [phone] with any questions/ concerns regarding above.    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 Patient reports he has been injecting lovenox to left thigh (last dose last night). In ED patient hypotensive and bradycardic, emergent release blood 2 units given with response in hemodynamics. CBC drawn after transfusion h/h 12.6/38.1. Protamine 50mg given. Patient reports left thigh pain with numbness and tingling down leg. Vascular following. IR consulted for evaluation of angiogram/ embolization.     Patient seen and examined in ED.     Allergies: No Known Allergies    Medications (Abx/Cardiac/Anticoagulation/Blood Products)  protamine  IVPB: 330 mL/Hr IV Intermittent (04-04 @ 02:08)    Data:  190.5  95.3  T(C): 36.8  HR: 74  BP: 112/74  RR: 17  SpO2: 100%    Gen: NAD, pale, A&Ox3  Abd: ND, soft, nttp  Extremities: Left inner thigh with large hematoma and ecchymosis. mid thigh with ace wrap additional ace wrap applied higher up on thigh. Reports difference in sensation in comparison to right leg (Prior to additional ace wrap being applied). +2 b/l DP/PT pulses. No discoloration in extremities. Equal tactile temp b/l, Cool temp of b/l feet but feet were exposed to air. Motor intact.     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 Patient reports he has been injecting lovenox to left thigh (last dose last night). In ED patient hypotensive and bradycardic, emergent release blood 2 units given with response in hemodynamics. CBC drawn after transfusion h/h 12.6/38.1. Vascular following. IR consulted for evaluation of angiogram/ embolization. Patient is currently hemodynamically stable.     -Consult in progress, will review with IR attending.   -s/p 2 U PRBC and protamine. Repeat cbc stable 12.4/36 from 12.6/38.1  -keep left thigh with compression wrap  -Continue medical management including coagulopathy correction and anticoagulation reversal as needed.  -If patient has hemodynamically significant bleeding despite adequate medical management, please contact IR for evaluation of emergent/ urgent angio/embo. In this scenario, ICU consult is required.   -Monitor hemodynamics closely.   -Keep patient NPO  -Continue to hold a/c  -Maintain active T&S  -Case discussed with ED team and vascular team. per discussion with vascular no ICU consult was obtained given patient stable hemodynamics.   -Please contact IR at [phone] with any questions/ concerns regarding above.    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 Patient reports he has been injecting lovenox to left thigh (last dose last night). In ED patient hypotensive and bradycardic, emergent release blood 2 units given with response in hemodynamics. CBC drawn after transfusion h/h 12.6/38.1. Protamine 50mg given. Patient reports left thigh pain with numbness and tingling down leg. Vascular following. IR consulted for evaluation of angiogram/ embolization.     Patient seen and examined in ED.     Allergies: No Known Allergies    Medications (Abx/Cardiac/Anticoagulation/Blood Products)  protamine  IVPB: 330 mL/Hr IV Intermittent (04-04 @ 02:08)    Data:  190.5  95.3  T(C): 36.8  HR: 74  BP: 112/74  RR: 17  SpO2: 100%    Gen: NAD, pale, A&Ox3  Abd: ND, soft, nttp  Extremities: Left inner thigh with large hematoma and ecchymosis. mid thigh with ace wrap additional ace wrap applied higher up on thigh. Reports difference in sensation in comparison to right leg (Prior to additional ace wrap being applied). +2 b/l DP/PT pulses. No discoloration in extremities. Equal tactile temp b/l, Cool temp of b/l feet but feet were exposed to air. Motor intact.     -WBC 12.28 / HgB 12.4 / Hct 36.0 / Plt 170  -Na 140 / Cl 103 / BUN 16 / Glucose 100  -K 3.8 / CO2 25 / Cr 0.92  -ALT 28 / Alk Phos 85 / T.Bili 0.2  -INR 0.98 / PTT 48.4    Radiology: < from: CT Angio Lower Extremity w/ IV Cont, Left (04.04.24 @ 05:31) >  IMPRESSION:  1. Interval increase in size of an anteromedial left upper thigh   subcutaneous hematoma with scattered foci of high attenuation within the   hematoma compatible with active hemorrhage.  2. New left gracilis intramuscular hematoma which could be extending from   the subcutaneous hematoma.    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 Patient reports he has been injecting lovenox to left thigh (last dose last night). In ED patient hypotensive and bradycardic, emergent release blood 2 units given with response in hemodynamics. CBC drawn after transfusion h/h 12.6/38.1. Vascular following. IR consulted for evaluation of angiogram/ embolization. Patient is currently hemodynamically stable.     -Imaging and case reviewed with IR attendings in [facility].   -Recommend continue medical management, bleeding should tamponade/ resolve on its own with adequate resuscitation and reversal of anticoagulation.   -s/p 2 U PRBC and protamine. Repeat cbc stable 12.4/36 from 12.6/38.1  -keep left thigh with compression wrap  -Continue medical management including coagulopathy correction and anticoagulation reversal as needed.  -If patient has hemodynamically significant bleeding despite adequate medical management, please contact IR for evaluation of emergent/ urgent angio/embo. In this scenario, ICU consult is required.   -Monitor hemodynamics closely.   -Keep patient NPO  -Continue to hold a/c  -Maintain active T&S  -Case discussed with ED team and vascular team. per discussion with vascular no ICU consult was obtained given patient stable hemodynamics.   -Above d/w primary team  -Please contact IR at [phone] with any questions/ concerns regarding above.

## 2024-04-04 NOTE — CONSULT NOTE ADULT - SUBJECTIVE AND OBJECTIVE BOX
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 Noticed within the last hour. No falls or trauma to the area. Administered Lovenox just prior to arrival. Denies fevers, chills, nausea, vomiting, urinary symptoms. No recent surgeries     PAST MEDICAL & SURGICAL HISTORY:  History of TIAs      CVA (cerebrovascular accident)      HLD (hyperlipidemia)      Vertigo      Colon polyp      Seizure disorder      History of fasciotomy      H/O arthroscopy of knee      S/P excision of neuroma      History of loop recorder          Allergies: Allergies    No Known Allergies    Intolerances        Social History: Denies toxic habits including tobacco, ETOH or illicit drugs.    Family History: FAMILY HISTORY:  No pertinent family history in first degree relatives    . No family history of strokes    Medications: MEDICATIONS  (STANDING):  atorvastatin 10 milliGRAM(s) Oral at bedtime  gabapentin 100 milliGRAM(s) Oral every 8 hours  lacosamide 150 milliGRAM(s) Oral two times a day  multivitamin 1 Tablet(s) Oral daily  tamsulosin 0.4 milliGRAM(s) Oral at bedtime    MEDICATIONS  (PRN):      Review of Systems:  CONSTITUTIONAL:  No weight loss, fever, chills, weakness or fatigue.  HEENT:  Eyes:  No visual loss, blurred vision, double vision or yellow sclera. Ears, Nose, Throat:  No hearing loss, sneezing, congestion, runny nose or sore throat.  SKIN:  No rash or itching.  CARDIOVASCULAR:  No chest pain, chest pressure or chest discomfort. No palpitations or edema.  RESPIRATORY:  No shortness of breath, cough or sputum.  GASTROINTESTINAL:  No anorexia, nausea, vomiting or diarrhea. No abdominal pain or blood.  GENITOURINARY:  No burning on urination or incontinence   NEUROLOGICAL:  No headache, dizziness, syncope, paralysis, ataxia, numbness or tingling in the extremities. No change in bowel or bladder control. no limb weakness. no vision changes.   MUSCULOSKELETAL:  No muscle, back pain, joint pain or stiffness.  HEMATOLOGIC:  No anemia, bleeding or bruising.  LYMPHATICS:  No enlarged nodes. No history of splenectomy.  PSYCHIATRIC:  No history of depression or anxiety.  ENDOCRINOLOGIC:  No reports of sweating, cold or heat intolerance. No polyuria or polydipsia.      Vitals:  Vital Signs Last 24 Hrs  T(C): 36.4 (04 Apr 2024 07:27), Max: 36.8 (04 Apr 2024 01:05)  T(F): 97.5 (04 Apr 2024 07:27), Max: 98.3 (04 Apr 2024 01:05)  HR: 90 (04 Apr 2024 08:10) (51 - 94)  BP: 118/89 (04 Apr 2024 08:10) (54/51 - 147/98)  BP(mean): 92 (04 Apr 2024 04:30) (45 - 93)  RR: 16 (04 Apr 2024 08:10) (10 - 18)  SpO2: 99% (04 Apr 2024 08:10) (96% - 100%)    Parameters below as of 04 Apr 2024 08:10  Patient On (Oxygen Delivery Method): room air        General Exam:   General Appearance: Appropriately dressed and in no acute distress       Head: Normocephalic, atraumatic and no dysmorphic features  Ear, Nose, and Throat: Moist mucous membranes  CVS: S1S2+  Resp: No SOB, no wheeze or rhonchi  GI: soft NT/ND  Extremities: No edema or cyanosis  Skin: No bruises or rashes     Neurological Exam:  Mental Status: Awake, alert and oriented x 3.  Able to follow simple and complex verbal commands. Able to name and repeat. fluent speech. No obvious aphasia or dysarthria noted.   Cranial Nerves: PERRL, EOMI, VFFC, sensation V1-V3 intact,  no obvious facial asymmetry, equal elevation of palate, scm/trap 5/5, tongue is midline on protrusion. no obvious papilledema on fundoscopic exam. hearing is grossly intact.   Motor: Normal bulk, tone and strength throughout. Fine finger movements were intact and symmetric. no tremors or drift noted.    Sensation: Intact to light touch and pinprick throughout. no right/left confusion. no extinction to tactile on DSS. Romberg was negative.   Reflexes: 1+ throughout at biceps, brachioradialis, triceps, patellars and ankles bilaterally and equal. No clonus. R toe and L toe were both downgoing.  Coordination: No dysmetria on FNF or HKS  Gait: Narrow based and steady. Able to tandem. no limitations in gait.     Data/Labs/Imaging which I personally reviewed.     Labs:     CBC Full  -  ( 04 Apr 2024 06:28 )  WBC Count : 12.28 K/uL  RBC Count : 4.14 M/uL  Hemoglobin : 12.4 g/dL  Hematocrit : 36.0 %  Platelet Count - Automated : 170 K/uL  Mean Cell Volume : 87.0 fl  Mean Cell Hemoglobin : 30.0 pg  Mean Cell Hemoglobin Concentration : 34.4 gm/dL  Auto Neutrophil # : 10.65 K/uL  Auto Lymphocyte # : 0.62 K/uL  Auto Monocyte # : 0.83 K/uL  Auto Eosinophil # : 0.03 K/uL  Auto Basophil # : 0.04 K/uL  Auto Neutrophil % : 86.8 %  Auto Lymphocyte % : 5.0 %  Auto Monocyte % : 6.8 %  Auto Eosinophil % : 0.2 %  Auto Basophil % : 0.3 %    04-04    140  |  103  |  16  ----------------------------<  100<H>  3.8   |  25  |  0.92    Ca    9.4      04 Apr 2024 01:17    TPro  7.3  /  Alb  4.2  /  TBili  0.2  /  DBili  x   /  AST  23  /  ALT  28  /  AlkPhos  85  04-04    LIVER FUNCTIONS - ( 04 Apr 2024 01:17 )  Alb: 4.2 g/dL / Pro: 7.3 g/dL / ALK PHOS: 85 U/L / ALT: 28 U/L / AST: 23 U/L / GGT: x           PT/INR - ( 04 Apr 2024 01:17 )   PT: 10.8 sec;   INR: 0.98 ratio         PTT - ( 04 Apr 2024 01:17 )  PTT:48.4 sec  Urinalysis Basic - ( 04 Apr 2024 01:17 )    Color: x / Appearance: x / SG: x / pH: x  Gluc: 100 mg/dL / Ketone: x  / Bili: x / Urobili: x   Blood: x / Protein: x / Nitrite: x   Leuk Esterase: x / RBC: x / WBC x   Sq Epi: x / Non Sq Epi: x / Bacteria: x

## 2024-04-04 NOTE — ED ADULT NURSE REASSESSMENT NOTE - NS ED NURSE REASSESS COMMENT FT1
Awake, alert and orientedx4. Breathing easy and non labored. Left upper thigh swollen wrapped with ace bandage.  IR at bedside, Reports pain at left thigh sharp 10/10. Medicated with Fentanyl 25 mcg IVP. Left leg elevated with pillow. Safety maintained. Will continue to monitor.

## 2024-04-04 NOTE — CONSULT NOTE ADULT - SUBJECTIVE AND OBJECTIVE BOX
Consult Note: Orthopedic Surgery    Orthopedic Surgery Resident called to bedside in ED to evaluate patient for possible Acute Compartment Syndrome. 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 The patient bears a medical history significant for a CVA with residual expressive aphasia as well as bilateral lower extremity DVTs on chronic Lovenox. The patient states that the swelling in his medial thigh is consistent with the frequent sites of his Lovenox injections as he utilizes primarily the medial inner bilateral thighs toward the groin. Also of note, the patient was hospitalized in early 2023 "for seizures" with his hospital course complicated by Left lower extremity compartment syndrome (below the knee) requiring two operations for decompressive fasciotomies (January 2023). Patient states he has residual but stable pain and partial numbness in the distal Left lower extremity as a result. With regard to his current presentation, the patient denies any recent traumas or injuries; patient attributes the new-onset swelling to his Lovenox injections. Denies head-strike, loss of consciousness, or any additional traumas. Localizing pain to the medial thigh near area of local swelling, denies radiation of pain elsewhere. States inability to ambulate due to "10/10" pain in the medial thigh/groin at present. Denies any numbness or tingling in the affected extremity apart from his post-fasciotomy baseline. Denies having any other pain elsewhere. Denies any previous orthopaedic history. Denies fevers, chills, headaches, chest pain, shortness of breath, nausea, vomiting, or any additional orthopaedic concerns or complaints at time of current encounter.    PAST MEDICAL & SURGICAL HISTORY:  History of TIAs  CVA (cerebrovascular accident)  HLD (hyperlipidemia)  Vertigo  Colon polyp  Seizure disorder  History of fasciotomy  H/O arthroscopy of knee  S/P excision of neuroma  History of loop recorder    ALLERGIES:  No Known Allergies    VITAL SIGNS:  T(C): 36.6 (04-04-24 @ 16:00), Max: 36.8 (04-04-24 @ 01:05)  HR: 98 (04-04-24 @ 20:00) (51 - 104)  BP: 108/86 (04-04-24 @ 20:00) (54/51 - 147/98)  RR: 14 (04-04-24 @ 20:00) (10 - 24)  SpO2: 96% (04-04-24 @ 20:00) (94% - 100%)      PHYSICAL EXAM  Gen: NAD, Resting comfortably    LLE:  Large, firm, medially-situated swelling of the groin / proximal thigh consistent with hematoma, with scattered track marks consistent with patient's endorsed Lovenox injection sites, occupying a bed of diffuse ecchymoses; Comparable ecchymoses accompanied my minimal swelling noted on the contralateral side.   Prior, well-healed fasciotomy scars noted in over the Left calf.   Skin other intact, no erythema or ecchymosis.   Marked point tenderness noted over above-described area of swelling; No bony tenderness to palpation.    + EHL/FHL/TA/GSC.   + SILT L3-S1.   + DP.   Mild pain in the anterior compartment below the knee with passive plantarflexion; patient endorses as chronic, stable baseline since prior fasciotomy.   Mild pain in the lateral compartment below the knee with passive inversion; patient endorses as chronic, stable baseline since prior fasciotomy.   Mild pain in the posterior compartment below the knee with passive dorsiflexion; patient endorses as chronic, stable baseline since prior fasciotomy.   No micromotion tenderness.   ALL compartments of the proximal (thigh) and distal (calf/shin) lower extremity soft and compressible; No concern for acute compartment syndrome.        SECONDARY SURVEY  RLE/RUE/LUE: No TTP over bony prominences, SILT, palpable pulses, full/painless range of motion, compartments soft  Spine: No bony tenderness. No palpable stepoffs.     IMAGING:    < from: CT Angio Lower Extremity w/ IV Cont, Left (04.04.24 @ 05:31) >  BONES: No fracture or dislocation. Bone island in the left superior pubic   ramus.    SOFT TISSUES: Interval increase in size of an anteromedial upper thigh   subcutaneous hematoma now measuring 5.7 x 15.4 x 12.8 cm (AP x TRV x CC)   with scattered foci ofhigh attenuation within the hematoma compatible   with active extravasation of administered intravenous contrast. Increased   mass effect on the medial compartment of the thigh by the subcutaneous   hematoma. Interval high attenuation enlargement of the left gracilis   muscle compatible with an intramuscular hematoma which could be extending   from the subcutaneous hematoma. Moderate subcutaneous inflammatory change   and skin thickening in the anterior and medial upper to mid thigh.    IMPRESSION:  1. Interval increase in size of an anteromedial left upper thigh   subcutaneous hematoma with scattered foci of high attenuation within the   hematoma compatible with active hemorrhage.  2. New left gracilis intramuscular hematoma which could be extending from   the subcutaneous hematoma.    < end of copied text >      ASSESSMENT:  Patient is a 53M with a Left thigh hematoma suspected to be due to spontaneous bleeding from Lovenox injections; No current clinical concern for acute compartment syndrome in the thigh    PLAN:  - No acute orthopedic intervention indicated at this time.   - In context of benign compartment exam, no serial compartment examinations required at this time.   - Recommend reversal of Lovenox with K Centra at the discretion of Primary Team.   - Pain control.   - WBAT RLE.   - Orthopaedically stable for discharge; Will continue to monitor while admitted.  - Please re-consult in the setting of increased pain or analgesic requirements consistent with acute compartment syndrome.    - All questions answered to patient's satisfaction. Patient understands and agrees with plan.  - Discussed with Dr. Epstein who is aware of and in agreement with the above plan.         Consult Note: Orthopedic Surgery    Orthopedic Surgery Resident called to bedside in ED to evaluate patient for possible Acute Compartment Syndrome. 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 The patient bears a medical history significant for a CVA with residual expressive aphasia as well as bilateral lower extremity DVTs on chronic Lovenox. The patient states that the swelling in his medial thigh is consistent with the frequent sites of his Lovenox injections as he utilizes primarily the medial inner bilateral thighs toward the groin. Also of note, the patient was hospitalized in early 2023 "for seizures" with his hospital course complicated by Left lower extremity compartment syndrome (below the knee) requiring two operations for decompressive fasciotomies (January 2023). Patient states he has residual but stable pain and partial numbness in the distal Left lower extremity as a result. With regard to his current presentation, the patient denies any recent traumas or injuries; patient attributes the new-onset swelling to his Lovenox injections. Denies head-strike, loss of consciousness, or any additional traumas. Localizing pain to the medial thigh near area of local swelling, denies radiation of pain elsewhere. States inability to ambulate due to "10/10" pain in the medial thigh/groin at present. Denies any numbness or tingling in the affected extremity apart from his post-fasciotomy baseline. Denies having any other pain elsewhere. Denies any previous orthopaedic history. Denies fevers, chills, headaches, chest pain, shortness of breath, nausea, vomiting, or any additional orthopaedic concerns or complaints at time of current encounter.    PAST MEDICAL & SURGICAL HISTORY:  History of TIAs  CVA (cerebrovascular accident)  HLD (hyperlipidemia)  Vertigo  Colon polyp  Seizure disorder  History of fasciotomy  H/O arthroscopy of knee  S/P excision of neuroma  History of loop recorder    ALLERGIES:  No Known Allergies    VITAL SIGNS:  T(C): 36.6 (04-04-24 @ 16:00), Max: 36.8 (04-04-24 @ 01:05)  HR: 98 (04-04-24 @ 20:00) (51 - 104)  BP: 108/86 (04-04-24 @ 20:00) (54/51 - 147/98)  RR: 14 (04-04-24 @ 20:00) (10 - 24)  SpO2: 96% (04-04-24 @ 20:00) (94% - 100%)      PHYSICAL EXAM  Gen: NAD, Resting comfortably    LLE:  Large, firm, medially-situated swelling of the groin / proximal thigh consistent with hematoma, with scattered track marks consistent with patient's endorsed Lovenox injection sites, occupying a bed of diffuse ecchymoses; Comparable ecchymoses accompanied my minimal swelling noted on the contralateral side.   Prior, well-healed fasciotomy scars noted in over the Left calf.   Skin other intact, no erythema or ecchymosis.   Marked point tenderness noted over above-described area of swelling; No bony tenderness to palpation.    + EHL/FHL/TA/GSC.   + SILT L3-S1.   + DP.   Mild pain in the anterior compartment below the knee with passive plantarflexion; patient endorses as chronic, stable baseline since prior fasciotomy.   Mild pain in the lateral compartment below the knee with passive inversion; patient endorses as chronic, stable baseline since prior fasciotomy.   Mild pain in the posterior compartment below the knee with passive dorsiflexion; patient endorses as chronic, stable baseline since prior fasciotomy.   No micromotion tenderness.   ALL compartments of the proximal (thigh) and distal (calf/shin) lower extremity soft and compressible; No concern for acute compartment syndrome.        SECONDARY SURVEY  RLE/RUE/LUE: No TTP over bony prominences, SILT, palpable pulses, full/painless range of motion, compartments soft  Spine: No bony tenderness. No palpable stepoffs.     IMAGING:    < from: CT Angio Lower Extremity w/ IV Cont, Left (04.04.24 @ 05:31) >  BONES: No fracture or dislocation. Bone island in the left superior pubic   ramus.    SOFT TISSUES: Interval increase in size of an anteromedial upper thigh   subcutaneous hematoma now measuring 5.7 x 15.4 x 12.8 cm (AP x TRV x CC)   with scattered foci ofhigh attenuation within the hematoma compatible   with active extravasation of administered intravenous contrast. Increased   mass effect on the medial compartment of the thigh by the subcutaneous   hematoma. Interval high attenuation enlargement of the left gracilis   muscle compatible with an intramuscular hematoma which could be extending   from the subcutaneous hematoma. Moderate subcutaneous inflammatory change   and skin thickening in the anterior and medial upper to mid thigh.    IMPRESSION:  1. Interval increase in size of an anteromedial left upper thigh   subcutaneous hematoma with scattered foci of high attenuation within the   hematoma compatible with active hemorrhage.  2. New left gracilis intramuscular hematoma which could be extending from   the subcutaneous hematoma.    < end of copied text >      ASSESSMENT:  Patient is a 53M with a Left thigh hematoma suspected to be due to spontaneous bleeding from Lovenox injections; No current clinical concern for acute compartment syndrome in the thigh    PLAN:  - No acute orthopedic intervention indicated at this time.   - In context of benign compartment exam, no serial compartment examinations required at this time.   - Recommend reversal of Lovenox with K Centra at the discretion of Primary Team.   - Pain control.   - WBAT RLE.   - Will continue to monitor while admitted.  - Please re-consult in the setting of increased pain or analgesic requirements consistent with acute compartment syndrome.    - All questions answered to patient's satisfaction. Patient understands and agrees with plan.  - Discussed and seen with Dr. Epstein who is aware of and in agreement with the above plan.

## 2024-04-04 NOTE — PROGRESS NOTE ADULT - SUBJECTIVE AND OBJECTIVE BOX
Patient is a 53y old  Male who presents with a chief complaint of Hematoma (04 Apr 2024 12:59)    Patient is seen and examined at the bedside.   Appears comfortable.  Pain is well controlled with hydromorphone.       MEDICATIONS  (STANDING):  atorvastatin 20 milliGRAM(s) Oral at bedtime  gabapentin 300 milliGRAM(s) Oral every 8 hours  lacosamide 150 milliGRAM(s) Oral two times a day  multivitamin 1 Tablet(s) Oral daily  polyethylene glycol 3350 17 Gram(s) Oral daily  senna 2 Tablet(s) Oral at bedtime  tamsulosin 0.4 milliGRAM(s) Oral at bedtime    MEDICATIONS  (PRN):  HYDROmorphone  Injectable 0.5 milliGRAM(s) IV Push every 4 hours PRN Moderate Pain (4 - 6)  HYDROmorphone  Injectable 1 milliGRAM(s) IV Push every 4 hours PRN Severe Pain (7 - 10)      Vital Signs Last 24 Hrs  T(C): 36.8 (04 Apr 2024 12:00), Max: 36.8 (04 Apr 2024 01:05)  T(F): 98.3 (04 Apr 2024 12:00), Max: 98.3 (04 Apr 2024 01:05)  HR: 99 (04 Apr 2024 14:00) (51 - 104)  BP: 114/83 (04 Apr 2024 14:00) (54/51 - 147/98)  BP(mean): 94 (04 Apr 2024 14:00) (45 - 104)  RR: 10 (04 Apr 2024 14:00) (10 - 20)  SpO2: 96% (04 Apr 2024 14:00) (94% - 100%)    Parameters below as of 04 Apr 2024 10:50  Patient On (Oxygen Delivery Method): room air      PE  NAD  Awake, alert  Anicteric, MMM  RRR  CTAB  Abd soft, NT, ND  No c/c                        12.2   10.93 )-----------( 189      ( 04 Apr 2024 11:02 )             36.7       04-04    137  |  102  |  16  ----------------------------<  145<H>  4.8   |  23  |  0.94    Ca    8.8      04 Apr 2024 11:02  Phos  4.1     04-04  Mg     1.9     04-04    TPro  6.7  /  Alb  3.8  /  TBili  0.9  /  DBili  x   /  AST  24  /  ALT  25  /  AlkPhos  75  04-04    CT angio Lower extremities 4/4/24  FINDINGS:    BONES: No fracture or dislocation. Bone island in the left superior pubic   ramus.    SOFT TISSUES: Interval increase in size of an anteromedial upper thigh   subcutaneous hematoma now measuring 5.7 x 15.4 x 12.8 cm (AP x TRV x CC)   with scattered foci of high attenuation within the hematoma compatible   with active extravasation of administered intravenous contrast. Increased   mass effect on the medial compartment of the thigh by the subcutaneous   hematoma. Interval high attenuation enlargement of the left gracilis   muscle compatible with an intramuscular hematoma which could be extending   from the subcutaneous hematoma. Moderate subcutaneous inflammatory change   and skin thickening in the anterior and medial upper to mid thigh.    IMPRESSION:  1. Interval increase in size of an anteromedial left upper thigh   subcutaneous hematoma with scattered foci of high attenuation within the   hematoma compatible with active hemorrhage.  2. New left gracilis intramuscular hematoma which could be extending from   the subcutaneous hematoma.    --- End of Report ---    CT A/P 4/4/24  FINDINGS:  LOWER CHEST: Bilateral lower lobe dependent atelectasis.    LIVER: Riedel's lobe. Stable left hepatic cyst.  BILE DUCTS: Normal caliber.  GALLBLADDER: Within normal limits.  SPLEEN: Within normal limits.  PANCREAS: Within normal limits.  ADRENALS: Within normal limits.  KIDNEYS/URETERS: Left extrarenal pelvis versus parapelvic cyst. No   hydronephrosis. Right kidney within normal limits.    BLADDER: Within normal limits.  REPRODUCTIVE ORGANS: Prostate is enlarged.    BOWEL: No bowel obstruction or inflammation. Scattered sigmoid   diverticulosis without diverticulitis. Appendix is normal aside from   retained oral contrast.  PERITONEUM: No ascites.  VESSELS: Atherosclerotic changes. Circumaortic left renal vein.  RETROPERITONEUM/LYMPH NODES: No lymphadenopathy.  ABDOMINAL WALL: Diastases of the abdominis rectus muscles. Small   umbilical hernia containing a nonobstructed loops of small bowel. Small   fat-containing left inguinal hernia. 3.9 x 1.7 cm left lower quadrant   anterior abdominal wall subcutaneous soft tissue attenuation likely a   sequela of injection. 3.6 x 2.0 x 3.8 cm slightly high attenuation   subcutaneous collection in the anterior upper right thigh concerning for   a hematoma. Mild subcutaneous inflammatory change in the visualized   anterior and lateral upper right thigh with associated foci of   subcutaneous gas.  BONES: Within normal limits.    LEFT LOWER EXTREMITY:  BONES: No fracture or dislocation. Bone island in the left superior pubic   ramus.  SOFT TISSUES: 5.2 x 11.6 x 9.9 cm (AP x TRV x CC) complex collection in   the anteromedial upper thigh containing layering hemorrhage and scattered   foci of high attenuation concerning for active extravasation of   administered intravenous contrast. Moderate subcutaneous inflammatory   change and skin thickening in the anterior and medial upper to mid thigh.    IMPRESSION:  1. No evidence of acute intra-abdominal or intrapelvic pathology.  2. Moderate to large subcutaneous hematoma in the anteromedial left upper   thigh containing scattered foci of high attenuation concerning for active   hemorrhage within the hematoma.  3. Small collection in the anterior upper right thigh suspicious for a   hematoma. Mild subcutaneous inflammatory change in the visualized   anterior and lateral upper right thigh with associated foci of   subcutaneous gas. Correlate with recent procedural history.    --- End of Report ---

## 2024-04-04 NOTE — H&P ADULT - HISTORY OF PRESENT ILLNESS
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 Otherwise, there was no trauma to the area nor falls. The pain is described as throbbing and does not radiate. He did not try any supportive measures and came to the hospital directly. Of note, the patient was on eliquis when he was found to have bilateral DVTs, however this was changed to lovenox when he had a CVA while on eliquis.    On admission, the patient's vitals were noted to be within normal limits, however he then became hypotensive for which he received 2u pRBCs and protamine sulfate. His labs were significant for anemia and leukocytosis and a CTA showed large hematoma in the LLE and small hematoma of the RLE. He is now admitted to the MICU for further management.

## 2024-04-04 NOTE — PROGRESS NOTE ADULT - ASSESSMENT
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 On admission, vitals significant for hypotension and labs showing anemia with CTA showing large hematoma of the LLE and small RLE hematoma with repeat CTA showing interval increase of the hematoma and new small LLE hematoma s/p 2units pRBCs. He is now admitted to the MICU for further management.     NEURO:  Baseline: AOx3     #Seizure disorder  # CVA with residual expressive aphasia    PLAN  - cw home AEDs (lacosamide and vyanese); vyvanse not on formulary, will tell patient to bring from home  - cw home gabapentin     CARDIOVASCULAR:  -Keep Mg>2, K>4, P>3    #HLD  -cw home atorvstatin      HEMODYNAMICS:  - patient hypotensive in ED s/p pRBCx2 and off pressors      RESPIRATORY:  NEFTALI    GI/NUTRITION:  NEFTALI    Diet:   -keep NPO for possible procedure     RENAL/:  #BPH  -cw flomax       ID:   #leukocytosis  - noted to have elevated WBC  - likely inflammatory iso blood loss with low concern for infection  - trend WBC and fever curve    HEME:  #Anemia  - patient anemic (Hb baseline 13-14) 2/2 hematoma  - follow up IR and vascular recs  - Maintain active type and screen  - transfuse for Hb>7  - monitor cbc q4    #DVTs  - hx of bilateral DVTs (noted to resolve 1/24)  - hold lovenox and AC iso bleed    MSK  - patient's LLE swollen with concern for compartment syndrome  - f/u ortho and vascular recs for management vs possible fasciotomy   - pain control with dilaudid 0.5, 1mg       ENDOCRINE:  - Monitor finger sticks q6 while NPO    DVT PPX:  - SCDs    ETHICS:  full code    care per MICU

## 2024-04-04 NOTE — H&P ADULT - ATTENDING COMMENTS
52 y/o M w/b/l DVTs on AC, prior CVA w/residual aphasia, seizure disorder,  admitted with spontaneous L thigh hematoma. CTA w/large hematoma w/continued extravization.     - [facility]  - Appreciate ortho/IR eval, no current evidence of compartament syndrome, no need for embolization at this time  - Neurovascular checks to monitor for development of compartment syndrome  - Hold therapeutic AC, consult for IVC filter placement as patient cannot currently receive AC  - Continue vimpat  - Requires ICU level of care for monitoring

## 2024-04-04 NOTE — PATIENT PROFILE ADULT - NSPROGENOTHERPROVIDER_GEN_A_NUR
[facility] Medicine  History & Physical    Patient Name: Cary Mandujano  MRN: [id number]  Patient Class: IP- Inpatient  Admission Date: 11/3/2023  Attending Physician: Chris Graham MD   Primary Care Provider: Kyl[facility] Primary Doctor         Patient information was obtained from patient and ER records.     Subjective:     Principal Problem:Retropharyngeal abscess    Chief Complaint:   Chief Complaint   Patient presents with    Facial Swelling     Presents with swelling to tongue, throat and neck onset 10/31. Seen and treated at a local facility yesterday. Negative strep. Dx with pharyngitis. Persistent swelling.         HPI: 47 yo w/ hx of tobacco abuse presenting with throat pain. Symptoms started on 10/31. Have worsened since then. Endorses subjective fever. Endorses mild neck swelling. Having mild shortness of breath but currently is comfortable. Endorses some nausea and non bloody emesis. Nothing like this before. Difficulty swallowing solid foods.      Past Medical History:   Diagnosis Date    Depression        Past Surgical History:   Procedure Laterality Date     SECTION         Review of patient's allergies indicates:  No Known Allergies    No current facility-administered medications on file prior to encounter.     Current Outpatient Medications on File Prior to Encounter   Medication Sig    diclofenac (VOLTAREN) 75 MG EC tablet Take 1 tablet (75 mg total) by mouth 2 (two) times daily as needed (pain).    FLUoxetine 10 MG capsule Take 10 mg by mouth once daily.    ondansetron (ZOFRAN-ODT) 4 MG TbDL Take 1 tablet (4 mg total) by mouth every 6 (six) hours as needed (Nausea).     Family History    Non-contributory       Tobacco Use    Smoking status: Every Day     Current packs/day: 0.50     Types: Cigarettes    Smokeless tobacco: Not on file   Substance and Sexual Activity    Alcohol use: Not Currently     Comment: occ    Drug use: No    Sexual activity: Yes      Birth control/protection: None     Review of Systems   All other systems reviewed and are negative.    Objective:     Vital Signs (Most Recent):  Temp: 100 °F (37.8 °C) (11/03/23 1202)  Pulse: 104 (11/03/23 1333)  Resp: 18 (11/03/23 1333)  BP: 122/74 (11/03/23 1333)  SpO2: 100 % (11/03/23 1333) Vital Signs (24h Range):  Temp:  [100 °F (37.8 °C)] 100 °F (37.8 °C)  Pulse:  [104-120] 104  Resp:  [18] 18  SpO2:  [99 %-100 %] 100 %  BP: (105-122)/(74-75) 122/74     Weight: 52.2 kg (115 lb)  Body mass index is 20.37 kg/m².     Physical Exam  Constitutional:       General: She is not in acute distress.     Appearance: Normal appearance.   HENT:      Head: Normocephalic and atraumatic.      Mouth/Throat:      Pharynx: Posterior oropharyngeal erythema present.      Comments:  Moderate edema of the left tonsil and uvula.  Moderate erythema  Eyes:      Extraocular Movements: Extraocular movements intact.      Pupils: Pupils are equal, round, and reactive to light.   Neck:      Comments: Mild swelling cervical region.   Cardiovascular:      Rate and Rhythm: Normal rate and regular rhythm.   Pulmonary:      Effort: Pulmonary effort is normal. No respiratory distress.      Breath sounds: No stridor.   Abdominal:      Palpations: Abdomen is soft.      Tenderness: There is no abdominal tenderness.   Musculoskeletal:      Right lower leg: No edema.      Left lower leg: No edema.   Skin:     General: Skin is warm and dry.   Neurological:      General: No focal deficit present.      Mental Status: She is alert and oriented to person, place, and time.   Psychiatric:         Mood and Affect: Mood normal.         Behavior: Behavior normal.              CRANIAL NERVES     CN III, IV, VI   Pupils are equal, round, and reactive to light.       Significant Labs: All pertinent labs within the past 24 hours have been reviewed.    Significant Imaging: I have reviewed all pertinent imaging results/findings within the past 24  hours.    Assessment/Plan:     * Retropharyngeal abscess  Sespsis 2/2 retropharyngeal abscess seen on CT   ENT consulted- Recommended IV clinda but we are currently on shortage- Will do IV unasyn-   Recommending IV decadron 16mg q12h for 24h and then switch to 8mg q12h  If no improvement will need transfer for intervention over the weekend since we do not have surgery on call  Prn pain and nausea medication  Receiving 30cc/kg bolus  Lactate wnl  Procal and INR pending  IVF after bolus  CLD      Tobacco abuse  Nicotine patch PRN        VTE Risk Mitigation (From admission, onward)         Ordered     IP VTE LOW RISK PATIENT  Once         11/03/23 1607     Place sequential compression device  Until discontinued         11/03/23 1607                               Chris Graham MD  Department of Hospital Medicine  [place] - Emergency Dept         none

## 2024-04-04 NOTE — ED ADULT NURSE REASSESSMENT NOTE - NS ED NURSE REASSESS COMMENT FT1
PRBC given emergently. Risks and benefits explained to patient. Patient verbalized understanding of risks and benefits. Patient aware of possible side effects. Vital signs stable. Second RN Geovanna at bedside for confirmation. First unit given as fast as possible with pressure bag as MD Mandile orders. Second unit to be given over 1 hour.

## 2024-04-04 NOTE — PROGRESS NOTE ADULT - ASSESSMENT
53y Male with pmhx CVA abd Hx of DVT's    Hematology and oncology consulted on this patient with Hx of DVT's and CVA      Anemia  - patient anemic (Hb baseline 13-14) 2/2 hematoma; S/P PRBC 2U in ED.    - follow up IR and vascular recs  - transfuse for Hb>7  - monitor cbc q4    DVTs  - hx of bilateral DVTs (noted to resolve 1/24)  - hold lovenox and AC for now  - pt last seen Dr Varela on 12/2023  - monitor for now    MSK  - patient's LLE swollen with concern for compartment syndrome  - f/u ortho and vascular recs for management vs possible fasciotomy   - pain control with dilaudid 0.5, 1m    will follow    Meenakshi Diaz NP  Hematology/Oncology  [facility]  [phone] (office)

## 2024-04-04 NOTE — ED ADULT NURSE NOTE - NSFALLRISKINTERV_ED_ALL_ED

## 2024-04-04 NOTE — H&P ADULT - NSHPPHYSICALEXAM_GEN_ALL_CORE
LOS:     VITALS:   T(C): 36.4 (04-04-24 @ 07:27), Max: 36.8 (04-04-24 @ 01:05)  HR: 90 (04-04-24 @ 08:10) (51 - 94)  BP: 118/89 (04-04-24 @ 08:10) (54/51 - 147/98)  RR: 16 (04-04-24 @ 08:10) (10 - 18)  SpO2: 99% (04-04-24 @ 08:10) (96% - 100%)    GENERAL: NAD, lying in bed comfortably  HEAD:  Atraumatic, Normocephalic  EYES: EOMI, PERRLA, conjunctiva and sclera clear  ENT: Moist mucous membranes  NECK: Supple, No JVD  CHEST/LUNG: Clear to auscultation bilaterally; No rales, rhonchi, wheezing, or rubs. Unlabored respirations  HEART: Regular rate and rhythm; No murmurs, rubs, or gallops  ABDOMEN: BSx4; Soft, nontender, nondistended  EXTREMITIES:  LLE swelling noted with skin mottling and decreased pulses

## 2024-04-04 NOTE — CONSULT NOTE ADULT - ASSESSMENT
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 Noticed within the last hour. No falls or trauma to the area. Administered Lovenox just prior to arrival. Denies fevers, chills, nausea, vomiting, urinary symptoms. No recent surgeries  found left thigh hematoma   s/p 2 units of PRBC     Impression:   1) chronic strokes, resid minimal WFD  2) seizure d/o 2/2 strokes  3) ADHD  4) vertigo BPPV, stable   5) new L thigh hematoma   6) LLE compartment syndrome     - f/u IR and surgery regarding hematoma   - Lovenox now held.  was injecting in thigh at site of hematoma ; last admission Lovenox started for DVT and changed to BID dosing   - for ADHD gets Vyvanse 50mg daily  - for seizure he is on vimpat 150 mg BID  - ICU eval   - for secondary stroke prevention should be on asa 81mg daily and full dose lovenox . now held   - statin therapy with LDL goal < 70mg/dL; atorvastatin 20 at home   - meclizline PRN   - PT/OT   - check FS, glucose control <180  - GI/DVT ppx  - Counseling on diet, exercise, and medication adherence was done  - Counseling on smoking cessation and alcohol consumption offered when appropriate.  - Pain assessed and judicious use of narcotics when appropriate was discussed.    - Stroke education given when appropriate.  - Importance of fall prevention discussed.   - Differential diagnosis and plan of care discussed with patient and/or family and primary team  - Thank you for allowing me to participate in the care of this patient. Call with questions.   Braxton Forde MD  Vascular Neurology  Office: [phone]

## 2024-04-04 NOTE — H&P ADULT - ASSESSMENT
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 On admission, vitals significant for hypotension and labs showing anemia with CTA showing large hematoma of the LLE and small hematoma of the RLE s/p 2units pRBCs. He is now admitted to the MICU for further management.     NEURO:  Baseline: AOx3     #Seizure disorder  # CVA with residual expressive aphasia    PLAN  - cw home AEDs (lacosamide and vyanese)    CARDIOVASCULAR:  -Keep Mg>2, K>4, P>3    #HLD  -cw home atorvstatin      HEMODYNAMICS:  - patient hypotensive in ED s/p pRBCx2 and off pressors      RESPIRATORY:  NEFTALI    GI/NUTRITION:  NEFTALI    Diet:   -keep NPO for possible procedure     RENAL/:  #BPH  -cw flomax       ID:   #leukocytosis  - noted to have elevated WBC  - likely inflammatory iso blood loss with low concern for infection  - trend WBC and fever curve    HEME:  #Anemia  - patient anemic (Hb baseline 13-14) 2/2 hematoma  - follow up IR and vascular recs  - Maintain active type and screen  - transfuse for Hb>7  - monitor cbc q4    #DVTs  - hx of bilateral DVTs (noted to resolve 1/24)  - hold lovenox and AC iso bleed    MSK  - patient's LLE swollen with concern for compartment syndrome  - f/u ortho and vascular recs for management vs possible fasciotomy     ENDOCRINE:  - Monitor finger sticks q6 while NPO    DVT PPX:  - SCDs    ETHICS:  full code   ASSESSMENT AND PLAN:    53y Male with pmhx CVA with residual expressive aphasia, 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 On admission, vitals significant for hypotension and labs showing anemia with CTA showing large hematoma of the LLE and small hematoma of the RLE s/p 2units pRBCs. He is now admitted to the MICU for further management.     NEURO:  Baseline: AOx3     #Seizure disorder  # CVA with residual expressive aphasia    PLAN  - cw home AEDs (lacosamide and vyanese); vyvanse not on formulary, will tell patient to bring from home  - cw home gabapentin     CARDIOVASCULAR:  -Keep Mg>2, K>4, P>3    #HLD  -cw home atorvstatin      HEMODYNAMICS:  - patient hypotensive in ED s/p pRBCx2 and off pressors      RESPIRATORY:  NEFTALI    GI/NUTRITION:  NEFTALI    Diet:   -keep NPO for possible procedure     RENAL/:  #BPH  -cw flomax       ID:   #leukocytosis  - noted to have elevated WBC  - likely inflammatory iso blood loss with low concern for infection  - trend WBC and fever curve    HEME:  #Anemia  - patient anemic (Hb baseline 13-14) 2/2 hematoma  - follow up IR and vascular recs  - Maintain active type and screen  - transfuse for Hb>7  - monitor cbc q4    #DVTs  - hx of bilateral DVTs (noted to resolve 1/24)  - hold lovenox and AC iso bleed    MSK  - patient's LLE swollen with concern for compartment syndrome  - f/u ortho and vascular recs for management vs possible fasciotomy       ENDOCRINE:  - Monitor finger sticks q6 while NPO    DVT PPX:  - SCDs    ETHICS:  full code   ASSESSMENT AND PLAN:    53y Male with pmhx CVA with residual expressive aphasia, 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 On admission, vitals significant for hypotension and labs showing anemia with CTA showing large hematoma of the LLE and small hematoma of the RLE s/p 2units pRBCs. He is now admitted to the MICU for further management.     NEURO:  Baseline: AOx3     #Seizure disorder  # CVA with residual expressive aphasia    PLAN  - cw home AEDs (lacosamide and vyanese); vyvanse not on formulary, will tell patient to bring from home  - cw home gabapentin     CARDIOVASCULAR:  -Keep Mg>2, K>4, P>3    #HLD  -cw home atorvstatin      HEMODYNAMICS:  - patient hypotensive in ED s/p pRBCx2 and off pressors      RESPIRATORY:  NEFTALI    GI/NUTRITION:  NEFTALI    Diet:   -keep NPO for possible procedure     RENAL/:  #BPH  -cw flomax       ID:   #leukocytosis  - noted to have elevated WBC  - likely inflammatory iso blood loss with low concern for infection  - trend WBC and fever curve    HEME:  #Anemia  - patient anemic (Hb baseline 13-14) 2/2 hematoma  - follow up IR and vascular recs  - Maintain active type and screen  - transfuse for Hb>7  - monitor cbc q4    #DVTs  - hx of bilateral DVTs (noted to resolve 1/24)  - hold lovenox and AC iso bleed    MSK  - patient's LLE swollen with concern for compartment syndrome  - f/u ortho and vascular recs for management vs possible fasciotomy   - pain control with dilaudid 0.5, 1mg       ENDOCRINE:  - Monitor finger sticks q6 while NPO    DVT PPX:  - SCDs    ETHICS:  full code   ASSESSMENT AND PLAN:    53y Male with pmhx CVA with residual expressive aphasia, 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 On admission, vitals significant for hypotension and labs showing anemia with CTA showing large hematoma of the LLE and small RLE hematoma with repeat CTA showing interval increase of the hematoma and new small LLE hematoma s/p 2units pRBCs. He is now admitted to the MICU for further management.     NEURO:  Baseline: AOx3     #Seizure disorder  # CVA with residual expressive aphasia    PLAN  - cw home AEDs (lacosamide and vyanese); vyvanse not on formulary, will tell patient to bring from home  - cw home gabapentin     CARDIOVASCULAR:  -Keep Mg>2, K>4, P>3    #HLD  -cw home atorvstatin      HEMODYNAMICS:  - patient hypotensive in ED s/p pRBCx2 and off pressors      RESPIRATORY:  NEFTALI    GI/NUTRITION:  NEFTALI    Diet:   -keep NPO for possible procedure     RENAL/:  #BPH  -cw flomax       ID:   #leukocytosis  - noted to have elevated WBC  - likely inflammatory iso blood loss with low concern for infection  - trend WBC and fever curve    HEME:  #Anemia  - patient anemic (Hb baseline 13-14) 2/2 hematoma  - follow up IR and vascular recs  - Maintain active type and screen  - transfuse for Hb>7  - monitor cbc q4    #DVTs  - hx of bilateral DVTs (noted to resolve 1/24)  - hold lovenox and AC iso bleed    MSK  - patient's LLE swollen with concern for compartment syndrome  - f/u ortho and vascular recs for management vs possible fasciotomy   - pain control with dilaudid 0.5, 1mg       ENDOCRINE:  - Monitor finger sticks q6 while NPO    DVT PPX:  - SCDs    ETHICS:  full code

## 2024-04-04 NOTE — CHART NOTE - NSCHARTNOTEFT_GEN_A_CORE
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 On admission, vitals significant for hypotension and labs showing anemia with CTA showing large hematoma of the LLE and small RLE hematoma with repeat CTA showing interval increase of the hematoma and new small LLE hematoma s/p 2units pRBCs. He is now admitted to the MICU for further management.     In the MICU, the patient was seen by IR who recommended conservative management with ACE wrapping and no intervention. Orthopaedic surgery was also consulted for concern of compartment syndrome, however there was low suspicion from their perspective. He was monitored and his hemoglobin ***          For Follow-Up:  [ ] IR Recs  [ ] Ortho recs   [ ] monitor Hb               OBJECTIVE --  Vital Signs Last 24 Hrs  T(C): 36.8 (04 Apr 2024 12:00), Max: 36.8 (04 Apr 2024 01:05)  T(F): 98.3 (04 Apr 2024 12:00), Max: 98.3 (04 Apr 2024 01:05)  HR: 93 (04 Apr 2024 12:00) (51 - 94)  BP: 120/83 (04 Apr 2024 12:00) (54/51 - 147/98)  BP(mean): 97 (04 Apr 2024 12:00) (45 - 104)  RR: 15 (04 Apr 2024 12:00) (10 - 20)  SpO2: 94% (04 Apr 2024 12:00) (94% - 100%)    Parameters below as of 04 Apr 2024 10:50  Patient On (Oxygen Delivery Method): room air        I&O's Summary      MEDICATIONS  (STANDING):  atorvastatin 20 milliGRAM(s) Oral at bedtime  gabapentin 300 milliGRAM(s) Oral every 8 hours  lacosamide 150 milliGRAM(s) Oral two times a day  multivitamin 1 Tablet(s) Oral daily  polyethylene glycol 3350 17 Gram(s) Oral daily  senna 2 Tablet(s) Oral at bedtime  tamsulosin 0.4 milliGRAM(s) Oral at bedtime    MEDICATIONS  (PRN):  HYDROmorphone  Injectable 0.5 milliGRAM(s) IV Push every 4 hours PRN Moderate Pain (4 - 6)  HYDROmorphone  Injectable 1 milliGRAM(s) IV Push every 4 hours PRN Severe Pain (7 - 10)        LABS                                            12.2                  Neurophils% (auto):   x      (04-04 @ 11:02):    10.93)-----------(189          Lymphocytes% (auto):  x                                             36.7                   Eosinphils% (auto):   x        Manual%: Neutrophils x    ; Lymphocytes x    ; Eosinophils x    ; Bands%: x    ; Blasts x                                    137    |  102    |  16                  Calcium: 8.8   / iCa: x      (04-04 @ 11:02)    ----------------------------<  145       Magnesium: 1.9                              4.8     |  23     |  0.94             Phosphorous: 4.1      TPro  6.7    /  Alb  3.8    /  TBili  0.9    /  DBili  x      /  AST  24     /  ALT  25     /  AlkPhos  75     04 Apr 2024 11:02    ( 04-04 @ 11:02 )   PT: 11.9 sec;   INR: 1.14 ratio  aPTT: 33.7 sec          ASSESSMENT & PLAN:

## 2024-04-04 NOTE — PATIENT PROFILE ADULT - FLU SEASON?
Both  T.C.  and  text  to  daughter  Annia Hodgson  in  attempt  to  schedule  FATUMA  visit. NO  answer  to  T. C.  with  mesage  left  adn  no  response  to  text. Awaiting  response  from  family. Yes...

## 2024-04-04 NOTE — CONSULT NOTE ADULT - SUBJECTIVE AND OBJECTIVE BOX
VASCULAR SURGERY CONSULT NOTE  --------------------------------------------------------------------------------------------  Patient is a 53y old  Male who presents with a chief complaint of left lower extremity pain.    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 2022),  (+) loop recorder placed Feb 2022 after 1st CVA. The patient presents to the ED with increase lower extremity pain without trauma. He describes the collection to have increased in size specially medially. He denies fever, chills, SOB, altered motor or sensory functions in the lower extremity.    In the ED, the patient is afebrile non-tachy, normotensive and saturating well on RA.       ROS: 10-system review is otherwise negative except HPI above.      PAST MEDICAL & SURGICAL HISTORY:  History of TIAs      CVA (cerebrovascular accident)      HLD (hyperlipidemia)      Vertigo      Colon polyp      Seizure disorder      History of fasciotomy      H/O arthroscopy of knee      S/P excision of neuroma      History of loop recorder        FAMILY HISTORY:  No pertinent family history in first degree relatives        SOCIAL HISTORY:      ALLERGIES: No Known Allergies      HOME MEDICATIONS:     CURRENT MEDICATIONS  MEDICATIONS (STANDING):   MEDICATIONS (PRN):  --------------------------------------------------------------------------------------------    Vitals:   T(C): 36.8 (04-04-24 @ 01:05), Max: 36.8 (04-04-24 @ 01:05)  HR: 87 (04-04-24 @ 01:55) (78 - 94)  BP: 135/102 (04-04-24 @ 01:55) (135/102 - 147/98)  RR: 17 (04-04-24 @ 01:55) (16 - 17)  SpO2: 99% (04-04-24 @ 01:55) (99% - 100%)  CAPILLARY BLOOD GLUCOSE        CAPILLARY BLOOD GLUCOSE          Height (cm): 190.5 (04-04 @ 00:37)  Weight (kg): 95.3 (04-04 @ 00:37)  BMI (kg/m2): 26.3 (04-04 @ 00:37)  BSA (m2): 2.24 (04-04 @ 00:37)    PHYSICAL EXAM:   General: NAD, Lying in bed comfortably  Neuro: A+Ox3  HEENT: NC/AT  Neck: Soft, supple  Cardio: RRR  Resp: Good effort  GI/Abd: Soft, NT/ND  Vascular: Left lower extremity is mildly swollen with overlying skin discoloration. Palpable collection non-fluctuance, tender, without palpable pulsation. Palpable pulses bilaterally. No decrease in motor or sensory functions.   --------------------------------------------------------------------------------------------    LABS  CBC (04-04 @ 01:17)                              12.6<L>                         9.12    )----------------(  211        66.7  % Neutrophils, 17.2  % Lymphocytes, ANC: 6.08                                38.1<L>    BMP (04-04 @ 01:17)             140     |  103     |  16    		Ca++ --      Ca 9.4                ---------------------------------( 100<H>		Mg --                 3.8     |  25      |  0.92  			Ph --        LFTs (04-04 @ 01:17)      TPro 7.3 / Alb 4.2 / TBili 0.2 / DBili -- / AST 23 / ALT 28 / AlkPhos 85    Coags (04-04 @ 01:17)  aPTT 48.4<H> / INR 0.98 / PT 10.8          --------------------------------------------------------------------------------------------    MICROBIOLOGY  Urinalysis (04-04 @ 01:17):     Color:  / Appearance:  / SG:  / pH:  / Gluc: 100<H> / Ketones:  / Bili:  / Urobili:  / Protein : / Nitrites:  / Leuk.Est:  / RBC:  / WBC:  / Sq Epi:  / Non Sq Epi:  / Bacteria          --------------------------------------------------------------------------------------------    IMAGING     VASCULAR SURGERY CONSULT NOTE  --------------------------------------------------------------------------------------------  Patient is a 53y old  Male who presents with a chief complaint of left lower extremity pain.    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 2022),  (+) loop recorder placed Feb 2022 after 1st CVA. The patient presents to the ED with increase lower extremity pain without trauma. He describes the collection to have increased in size specially medially. He denies fever, chills, SOB, altered motor or sensory functions in the lower extremity.    In the ED, the patient is afebrile non-tachy, normotensive and saturating well on RA.       ROS: 10-system review is otherwise negative except HPI above.        PAST MEDICAL & SURGICAL HISTORY:  History of TIAs  CVA (cerebrovascular accident)  HLD (hyperlipidemia)  Vertigo  Colon polyp  Seizure disorder  History of fasciotomy (left leg)  H/O arthroscopy of knee  S/P excision of neuroma  History of loop recorder      ALLERGIES: No Known Allergies      HOME MEDICATIONS:   aspirin 81 mg oral delayed release tablet: 1 tab(s) orally once a day (31 Jan 2024 08:34)  atorvastatin 20 mg oral tablet: 1 tab(s) orally once a day (at bedtime) (31 Jan 2024 08:34)  gabapentin 300 mg oral capsule: 1 cap(s) orally 2 times a day (31 Jan 2024 08:34)  lacosamide 150 mg oral tablet: 1 tab(s) orally 2 times a day (31 Jan 2024 08:34)  Multiple Vitamins oral tablet: 1 tab(s) orally once a day (31 Jan 2024 08:34)  Vyvanse 50 mg oral capsule: 1 cap(s) orally once a day (at bedtime) (31 Jan 2024 08:34)    --------------------------------------------------------------------------------------------    Vitals:   T(C): 36.8 (04-04-24 @ 01:05), Max: 36.8 (04-04-24 @ 01:05)  HR: 87 (04-04-24 @ 01:55) (78 - 94)  BP: 135/102 (04-04-24 @ 01:55) (135/102 - 147/98)  RR: 17 (04-04-24 @ 01:55) (16 - 17)  SpO2: 99% (04-04-24 @ 01:55) (99% - 100%)  CAPILLARY BLOOD GLUCOSE      PHYSICAL EXAM:   General: NAD, Lying in bed comfortably  Neuro: A+Ox3  HEENT: NC/AT  Neck: Soft, supple  Cardio: RRR  Resp: Good effort  GI/Abd: Soft, NT/ND  Vascular: Left lower extremity is mildly swollen with overlying skin discoloration. Palpable collection non-fluctuance, tender, without palpable pulsation. Palpable pulses bilaterally. No decrease in motor or sensory functions.   --------------------------------------------------------------------------------------------    LABS  CBC (04-04 @ 01:17)                              12.6<L>                         9.12    )----------------(  211        66.7  % Neutrophils, 17.2  % Lymphocytes, ANC: 6.08                                38.1<L>    BMP (04-04 @ 01:17)             140     |  103     |  16    		Ca++ --      Ca 9.4                ---------------------------------( 100<H>		Mg --                 3.8     |  25      |  0.92  			Ph --        LFTs (04-04 @ 01:17)      TPro 7.3 / Alb 4.2 / TBili 0.2 / DBili -- / AST 23 / ALT 28 / AlkPhos 85    Coags (04-04 @ 01:17)  aPTT 48.4<H> / INR 0.98 / PT 10.8    --------------------------------------------------------------------------------------------    IMAGING  LOWER CHEST: Bilateral lower lobe dependent atelectasis.    LIVER: Riedel's lobe. Stable left hepatic cyst.  BILE DUCTS: Normal caliber.  GALLBLADDER: Within normal limits.  SPLEEN: Within normal limits.  PANCREAS: Within normal limits.  ADRENALS: Within normal limits.  KIDNEYS/URETERS: Left extrarenal pelvis versus parapelvic cyst. No   hydronephrosis. Right kidney within normal limits.    BLADDER: Within normal limits.  REPRODUCTIVE ORGANS: Prostate is enlarged.    BOWEL: No bowel obstruction or inflammation. Scattered sigmoid   diverticulosis without diverticulitis. Appendix is normal aside from   retained oral contrast.  PERITONEUM: No ascites.  VESSELS: Atherosclerotic changes. Circumaortic left renal vein.  RETROPERITONEUM/LYMPH NODES: No lymphadenopathy.  ABDOMINAL WALL: Diastases of the abdominis rectus muscles. Small   umbilical hernia containing a nonobstructed loops of small bowel. Small   fat-containing left inguinal hernia. 3.9 x 1.7 cm left lower quadrant   anterior abdominal wall subcutaneous soft tissue attenuation likely a   sequela of injection. 3.6 x 2.0 x 3.8 cm slightly high attenuation   subcutaneous collection in the anterior upper right thigh concerning for   a hematoma. Mild subcutaneous inflammatory change in the visualized   anterior and lateral upper right thigh with associated foci of   subcutaneous gas.  BONES: Within normal limits.    LEFT LOWER EXTREMITY:  BONES: No fracture or dislocation. Bone island in the left superior pubic   ramus.  SOFT TISSUES: 5.2 x 11.6 x 9.9 cm (AP x TRV x CC) complex collection in   the anteromedial upper thigh containing layering hemorrhage and scattered   foci of high attenuation concerning for active extravasation of   administered intravenous contrast. Moderate subcutaneous inflammatory   change and skin thickening in the anterior and medial upper to mid thigh.    IMPRESSION:  1. No evidence of acute intra-abdominal or intrapelvic pathology.  2. Moderate to large subcutaneous hematoma in the anteromedial left upper   thigh containing scattered foci of high attenuation concerning for active   hemorrhage within the hematoma.  3. Small collection in the anterior upper right thigh suspicious for a   hematoma. Mild subcutaneous inflammatory change in the visualized   anterior and lateral upper right thigh with associated foci of   subcutaneous gas. Correlate with recent procedural history.    Findings were discussed with Dr. Janeth Ramirez 4/4/2024 1:44 AM by Dr. Carol Phipps with read back confirmation.      CTA Lower Extremity:  1. Interval increase in size of an anteromedial left upper thigh   subcutaneous hematoma with scattered foci of high attenuation within the   hematoma compatible with active hemorrhage.  2. New left gracilis intramuscular hematoma which could be extending from   the subcutaneous hematoma.

## 2024-04-04 NOTE — CONSULT NOTE ADULT - ATTENDING COMMENTS
Despite size of hematoma, it is suprafascial on CT.  There is pain from the hematoma but no pain c/w ACS.  Rec KCentra.  compression and ice to L thigh.  OOB.

## 2024-04-04 NOTE — ED PROVIDER NOTE - OBJECTIVE STATEMENT
Attending Dr. 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 Noticed within the last hour. No falls or trauma to the area. Administered Lovenox just prior to arrival. Denies fevers, chills, nausea, vomiting, urinary symptoms. No recent surgeries.

## 2024-04-05 LAB
ALBUMIN SERPL ELPH-MCNC: 3.4 G/DL — SIGNIFICANT CHANGE UP (ref 3.3–5)
ALP SERPL-CCNC: 65 U/L — SIGNIFICANT CHANGE UP (ref 40–120)
ALT FLD-CCNC: 19 U/L — SIGNIFICANT CHANGE UP (ref 10–45)
ANION GAP SERPL CALC-SCNC: 11 MMOL/L — SIGNIFICANT CHANGE UP (ref 5–17)
ANION GAP SERPL CALC-SCNC: 11 MMOL/L — SIGNIFICANT CHANGE UP (ref 5–17)
APTT BLD: 30.8 SEC — SIGNIFICANT CHANGE UP (ref 24.5–35.6)
AST SERPL-CCNC: 10 U/L — SIGNIFICANT CHANGE UP (ref 10–40)
BILIRUB SERPL-MCNC: 0.2 MG/DL — SIGNIFICANT CHANGE UP (ref 0.2–1.2)
BUN SERPL-MCNC: 28 MG/DL — HIGH (ref 7–23)
BUN SERPL-MCNC: 30 MG/DL — HIGH (ref 7–23)
CALCIUM SERPL-MCNC: 7.5 MG/DL — LOW (ref 8.4–10.5)
CALCIUM SERPL-MCNC: 8.5 MG/DL — SIGNIFICANT CHANGE UP (ref 8.4–10.5)
CHLORIDE SERPL-SCNC: 101 MMOL/L — SIGNIFICANT CHANGE UP (ref 96–108)
CHLORIDE SERPL-SCNC: 103 MMOL/L — SIGNIFICANT CHANGE UP (ref 96–108)
CK SERPL-CCNC: 49 U/L — SIGNIFICANT CHANGE UP (ref 30–200)
CK SERPL-CCNC: 63 U/L — SIGNIFICANT CHANGE UP (ref 30–200)
CO2 SERPL-SCNC: 22 MMOL/L — SIGNIFICANT CHANGE UP (ref 22–31)
CO2 SERPL-SCNC: 23 MMOL/L — SIGNIFICANT CHANGE UP (ref 22–31)
CREAT SERPL-MCNC: 1.08 MG/DL — SIGNIFICANT CHANGE UP (ref 0.5–1.3)
CREAT SERPL-MCNC: 1.18 MG/DL — SIGNIFICANT CHANGE UP (ref 0.5–1.3)
EGFR: 74 ML/MIN/1.73M2 — SIGNIFICANT CHANGE UP
EGFR: 82 ML/MIN/1.73M2 — SIGNIFICANT CHANGE UP
GLUCOSE SERPL-MCNC: 146 MG/DL — HIGH (ref 70–99)
GLUCOSE SERPL-MCNC: 185 MG/DL — HIGH (ref 70–99)
HCT VFR BLD CALC: 25 % — LOW (ref 39–50)
HCT VFR BLD CALC: 26.9 % — LOW (ref 39–50)
HCT VFR BLD CALC: 27.7 % — LOW (ref 39–50)
HCT VFR BLD CALC: 31.1 % — LOW (ref 39–50)
HEPARINASE TEG R TIME: 6.4 MIN — SIGNIFICANT CHANGE UP (ref 4.3–8.3)
HGB BLD-MCNC: 10.6 G/DL — LOW (ref 13–17)
HGB BLD-MCNC: 8.5 G/DL — LOW (ref 13–17)
HGB BLD-MCNC: 9 G/DL — LOW (ref 13–17)
HGB BLD-MCNC: 9.3 G/DL — LOW (ref 13–17)
INR BLD: 1.12 RATIO — SIGNIFICANT CHANGE UP (ref 0.85–1.18)
MAGNESIUM SERPL-MCNC: 2 MG/DL — SIGNIFICANT CHANGE UP (ref 1.6–2.6)
MAGNESIUM SERPL-MCNC: 2 MG/DL — SIGNIFICANT CHANGE UP (ref 1.6–2.6)
MCHC RBC-ENTMCNC: 29.2 PG — SIGNIFICANT CHANGE UP (ref 27–34)
MCHC RBC-ENTMCNC: 29.4 PG — SIGNIFICANT CHANGE UP (ref 27–34)
MCHC RBC-ENTMCNC: 29.5 PG — SIGNIFICANT CHANGE UP (ref 27–34)
MCHC RBC-ENTMCNC: 29.9 PG — SIGNIFICANT CHANGE UP (ref 27–34)
MCHC RBC-ENTMCNC: 33.5 GM/DL — SIGNIFICANT CHANGE UP (ref 32–36)
MCHC RBC-ENTMCNC: 33.6 GM/DL — SIGNIFICANT CHANGE UP (ref 32–36)
MCHC RBC-ENTMCNC: 34 GM/DL — SIGNIFICANT CHANGE UP (ref 32–36)
MCHC RBC-ENTMCNC: 34.1 GM/DL — SIGNIFICANT CHANGE UP (ref 32–36)
MCV RBC AUTO: 86.8 FL — SIGNIFICANT CHANGE UP (ref 80–100)
MCV RBC AUTO: 87.1 FL — SIGNIFICANT CHANGE UP (ref 80–100)
MCV RBC AUTO: 87.9 FL — SIGNIFICANT CHANGE UP (ref 80–100)
MCV RBC AUTO: 87.9 FL — SIGNIFICANT CHANGE UP (ref 80–100)
NRBC # BLD: 0 /100 WBCS — SIGNIFICANT CHANGE UP (ref 0–0)
PHOSPHATE SERPL-MCNC: 4.3 MG/DL — SIGNIFICANT CHANGE UP (ref 2.5–4.5)
PHOSPHATE SERPL-MCNC: 4.4 MG/DL — SIGNIFICANT CHANGE UP (ref 2.5–4.5)
PLATELET # BLD AUTO: 165 K/UL — SIGNIFICANT CHANGE UP (ref 150–400)
PLATELET # BLD AUTO: 168 K/UL — SIGNIFICANT CHANGE UP (ref 150–400)
PLATELET # BLD AUTO: 179 K/UL — SIGNIFICANT CHANGE UP (ref 150–400)
PLATELET # BLD AUTO: 183 K/UL — SIGNIFICANT CHANGE UP (ref 150–400)
POTASSIUM SERPL-MCNC: 4.7 MMOL/L — SIGNIFICANT CHANGE UP (ref 3.5–5.3)
POTASSIUM SERPL-MCNC: 4.7 MMOL/L — SIGNIFICANT CHANGE UP (ref 3.5–5.3)
POTASSIUM SERPL-SCNC: 4.7 MMOL/L — SIGNIFICANT CHANGE UP (ref 3.5–5.3)
POTASSIUM SERPL-SCNC: 4.7 MMOL/L — SIGNIFICANT CHANGE UP (ref 3.5–5.3)
PROT SERPL-MCNC: 6 G/DL — SIGNIFICANT CHANGE UP (ref 6–8.3)
PROTHROM AB SERPL-ACNC: 11.7 SEC — SIGNIFICANT CHANGE UP (ref 9.5–13)
RAPIDTEG MAXIMUM AMPLITUDE: 69.9 MM — SIGNIFICANT CHANGE UP (ref 52–70)
RBC # BLD: 2.88 M/UL — LOW (ref 4.2–5.8)
RBC # BLD: 3.06 M/UL — LOW (ref 4.2–5.8)
RBC # BLD: 3.18 M/UL — LOW (ref 4.2–5.8)
RBC # BLD: 3.54 M/UL — LOW (ref 4.2–5.8)
RBC # FLD: 13.7 % — SIGNIFICANT CHANGE UP (ref 10.3–14.5)
RBC # FLD: 14 % — SIGNIFICANT CHANGE UP (ref 10.3–14.5)
RBC # FLD: 14.2 % — SIGNIFICANT CHANGE UP (ref 10.3–14.5)
RBC # FLD: 14.3 % — SIGNIFICANT CHANGE UP (ref 10.3–14.5)
SODIUM SERPL-SCNC: 135 MMOL/L — SIGNIFICANT CHANGE UP (ref 135–145)
SODIUM SERPL-SCNC: 136 MMOL/L — SIGNIFICANT CHANGE UP (ref 135–145)
TEG FUNCTIONAL FIBRINOGEN: 33.9 MM — HIGH (ref 15–32)
TEG MAXIMUM AMPLITUDE: 69.5 MM — HIGH (ref 52–69)
TEG REACTION TIME: 7.9 MIN — SIGNIFICANT CHANGE UP (ref 4.6–9.1)
WBC # BLD: 16.09 K/UL — HIGH (ref 3.8–10.5)
WBC # BLD: 16.86 K/UL — HIGH (ref 3.8–10.5)
WBC # BLD: 17.72 K/UL — HIGH (ref 3.8–10.5)
WBC # BLD: 18.21 K/UL — HIGH (ref 3.8–10.5)
WBC # FLD AUTO: 16.09 K/UL — HIGH (ref 3.8–10.5)
WBC # FLD AUTO: 16.86 K/UL — HIGH (ref 3.8–10.5)
WBC # FLD AUTO: 17.72 K/UL — HIGH (ref 3.8–10.5)
WBC # FLD AUTO: 18.21 K/UL — HIGH (ref 3.8–10.5)

## 2024-04-05 PROCEDURE — 99233 SBSQ HOSP IP/OBS HIGH 50: CPT | Mod: GC

## 2024-04-05 PROCEDURE — 73706 CT ANGIO LWR EXTR W/O&W/DYE: CPT | Mod: 26,LT

## 2024-04-05 PROCEDURE — 27301 DRAIN THIGH/KNEE LESION: CPT | Mod: LT

## 2024-04-05 PROCEDURE — 99221 1ST HOSP IP/OBS SF/LOW 40: CPT

## 2024-04-05 DEVICE — SURGICEL NU-KNIT 6 X 9": Type: IMPLANTABLE DEVICE | Site: LEFT | Status: FUNCTIONAL

## 2024-04-05 DEVICE — CLIP APPLIER COVIDIEN SURGICLIP III 9" SM: Type: IMPLANTABLE DEVICE | Site: LEFT | Status: FUNCTIONAL

## 2024-04-05 DEVICE — CLIP APPLIER COVIDIEN SURGICLIP II 9.75" MEDIUM: Type: IMPLANTABLE DEVICE | Site: LEFT | Status: FUNCTIONAL

## 2024-04-05 RX ORDER — CALCIUM GLUCONATE 100 MG/ML
2 VIAL (ML) INTRAVENOUS ONCE
Refills: 0 | Status: COMPLETED | OUTPATIENT
Start: 2024-04-05 | End: 2024-04-05

## 2024-04-05 RX ORDER — SODIUM CHLORIDE 9 MG/ML
1000 INJECTION, SOLUTION INTRAVENOUS
Refills: 0 | Status: DISCONTINUED | OUTPATIENT
Start: 2024-04-05 | End: 2024-04-05

## 2024-04-05 RX ORDER — ACETAMINOPHEN 500 MG
1000 TABLET ORAL ONCE
Refills: 0 | Status: COMPLETED | OUTPATIENT
Start: 2024-04-05 | End: 2024-04-05

## 2024-04-05 RX ADMIN — GABAPENTIN 300 MILLIGRAM(S): 400 CAPSULE ORAL at 13:48

## 2024-04-05 RX ADMIN — MORPHINE SULFATE 4 MILLIGRAM(S): 50 CAPSULE, EXTENDED RELEASE ORAL at 07:01

## 2024-04-05 RX ADMIN — SENNA PLUS 2 TABLET(S): 8.6 TABLET ORAL at 21:07

## 2024-04-05 RX ADMIN — LACOSAMIDE 150 MILLIGRAM(S): 50 TABLET ORAL at 17:31

## 2024-04-05 RX ADMIN — FENTANYL CITRATE 25 MICROGRAM(S): 50 INJECTION INTRAVENOUS at 07:01

## 2024-04-05 RX ADMIN — LACOSAMIDE 150 MILLIGRAM(S): 50 TABLET ORAL at 06:18

## 2024-04-05 RX ADMIN — GABAPENTIN 300 MILLIGRAM(S): 400 CAPSULE ORAL at 06:19

## 2024-04-05 RX ADMIN — TAMSULOSIN HYDROCHLORIDE 0.4 MILLIGRAM(S): 0.4 CAPSULE ORAL at 21:07

## 2024-04-05 RX ADMIN — Medication 200 GRAM(S): at 14:34

## 2024-04-05 RX ADMIN — Medication 400 MILLIGRAM(S): at 02:14

## 2024-04-05 RX ADMIN — FENTANYL CITRATE 50 MICROGRAM(S): 50 INJECTION INTRAVENOUS at 07:01

## 2024-04-05 RX ADMIN — GABAPENTIN 300 MILLIGRAM(S): 400 CAPSULE ORAL at 21:07

## 2024-04-05 RX ADMIN — ATORVASTATIN CALCIUM 20 MILLIGRAM(S): 80 TABLET, FILM COATED ORAL at 21:07

## 2024-04-05 RX ADMIN — Medication 1000 MILLIGRAM(S): at 02:44

## 2024-04-05 NOTE — CHART NOTE - NSCHARTNOTEFT_GEN_A_CORE
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 Patient reports he has been injecting lovenox to left thigh (last dose last night). In ED patient hypotensive and bradycardic, emergent release blood 2 units given with response in hemodynamics. Pt now in MICU w/ downtrending H/H, and soft hemodynamics not requiring pressors. IR re/cs due to concern of increasing thigh size and softer hemodynamics.    -- Recommend continued conservative management at this time.  -- Please ensure leg is tightly wrapped.   -- Re-check H/H and give one unit  -- Triple phase CTA if continued drop in H/H to evaluate for active bleed/additional areas of bleeding.  -- IR will continue to follow. If patient becomes increasingly hemodynamically unstable, possibly requiring more urgent/emergent evaluation, please reach out and can re-evaluate at that time.    Primitivo Obregon M.D.  PGY4/R3, Interventional Radiology Resident    -Available on [employer] TEAMS for all non-urgent questions  -Emergent issues: [facility]-p.[phone]; [facility]-p.[phone] ([phone])  -Non-emergent consults: Please place a [facility] order "Consult-Interventional Radiology" with an appropriate callback number  -Scheduling questions: [facility]: [phone]; [facility]: [phone]  -Clinic/Outpatient booking: [facility]: [phone]; [facility]: [phone] 53y Male with PMH of CVA with residual word finding difficulty, 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 Patient reports he has been injecting lovenox to left thigh (last dose last night). In ED patient hypotensive and bradycardic, emergent release blood 2 units given with response in hemodynamics. Pt now in MICU w/ downtrending H/H, and soft hemodynamics not requiring pressors. IR re/cs due to concern of increasing thigh size and softer hemodynamics.    -- Recommend continued conservative management at this time.  -- Please ensure leg is tightly wrapped.   -- Re-check H/H and give one unit.  -- Triple phase CTA if continued drop in H/H to evaluate for active bleed/additional areas of bleeding.  -- IR will continue to follow. If patient becomes increasingly hemodynamically unstable, possibly requiring more urgent/emergent evaluation, please reach out and can re-evaluate at that time.    Primitivo Obregon M.D.  PGY4/R3, Interventional Radiology Resident    -Available on [employer] TEAMS for all non-urgent questions  -Emergent issues: [facility]-p.[phone]; [facility]-p.[phone] ([phone])  -Non-emergent consults: Please place a [facility] order "Consult-Interventional Radiology" with an appropriate callback number  -Scheduling questions: [facility]: [phone]; [facility]: [phone]  -Clinic/Outpatient booking: [facility]: [phone]; [facility]: [phone]

## 2024-04-05 NOTE — DIETITIAN INITIAL EVALUATION ADULT - PERTINENT LABORATORY DATA
04-05    136  |  103  |  30<H>  ----------------------------<  185<H>  4.7   |  22  |  1.18    Ca    8.5      05 Apr 2024 03:34  Phos  4.4     04-05  Mg     2.0     04-05    TPro  6.0  /  Alb  3.4  /  TBili  0.2  /  DBili  x   /  AST  10  /  ALT  19  /  AlkPhos  65  04-05

## 2024-04-05 NOTE — PROGRESS NOTE ADULT - ATTENDING COMMENTS
54 y/o M w/b/l DVTs on AC, prior CVA w/residual aphasia, seizure disorder,  admitted with spontaneous L thigh hematoma. CTA w/large hematoma w/continued extravization. Acute blood loss anemia.    - Appreciate vascular eval, possible OR today for hematoma evacuation  - Neurovascular checks to monitor for development of compartment syndrome  - Trend CBC, transfuse PRN for hgb < 7  - Hold therapeutic AC, consult for IVC filter placement as patient cannot currently receive AC  - Continue vimpat  - Requires ICU level of care for monitoring

## 2024-04-05 NOTE — PROGRESS NOTE ADULT - SUBJECTIVE AND OBJECTIVE BOX
GENERAL SURGERY PROGRESS NOTE    SUBJECTIVE  No acute issues overnight. Seen and examined on morning rounds. Patient denying LLE pain.    10-point review of systems completed and negative except as noted above.      OBJECTIVE    MEDICATIONS  atorvastatin 20 milliGRAM(s) Oral at bedtime  gabapentin 300 milliGRAM(s) Oral every 8 hours  HYDROmorphone  Injectable 0.5 milliGRAM(s) IV Push every 4 hours PRN  HYDROmorphone  Injectable 0.25 milliGRAM(s) IV Push four times a day PRN  lacosamide 150 milliGRAM(s) Oral two times a day  multivitamin 1 Tablet(s) Oral daily  polyethylene glycol 3350 17 Gram(s) Oral daily  senna 2 Tablet(s) Oral at bedtime  tamsulosin 0.4 milliGRAM(s) Oral at bedtime      PHYSICAL EXAM  T(C): 37.2 (04-05-24 @ 12:35), Max: 37.2 (04-05-24 @ 12:35)  HR: 91 (04-05-24 @ 12:35) (86 - 111)  BP: 114/69 (04-05-24 @ 12:35) (87/65 - 128/71)  RR: 14 (04-05-24 @ 12:35) (10 - 24)  SpO2: 100% (04-05-24 @ 12:35) (96% - 100%)    04-04-24 @ 07:01  -  04-05-24 @ 07:00  --------------------------------------------------------  IN: 1555 mL / OUT: 1225 mL / NET: 330 mL    04-05-24 @ 07:01  -  04-05-24 @ 13:17  --------------------------------------------------------  IN: 0 mL / OUT: 0 mL / NET: 0 mL    General: Not acutely distressed  Neuro: AO x4  Respiratory: nonlabored respirations  CV: pulse present   Abdomen: soft, nontender, nondistended, no rebound or guarding, no palpable mass  Extremities: warm. Left thigh with large palpable hematoma and overlying ecchymoses and blistering     LABS                        9.0    17.72 )-----------( 168      ( 05 Apr 2024 12:49 )             26.9     04-05    135  |  101  |  28<H>  ----------------------------<  146<H>  4.7   |  23  |  1.08    Ca    7.5<L>      05 Apr 2024 12:49  Phos  4.3     04-05  Mg     2.0     04-05    TPro  6.0  /  Alb  3.4  /  TBili  0.2  /  DBili  x   /  AST  10  /  ALT  19  /  AlkPhos  65  04-05    PT/INR - ( 05 Apr 2024 03:34 )   PT: 11.7 sec;   INR: 1.12 ratio         PTT - ( 05 Apr 2024 03:34 )  PTT:30.8 sec  Urinalysis Basic - ( 05 Apr 2024 12:49 )    Color: x / Appearance: x / SG: x / pH: x  Gluc: 146 mg/dL / Ketone: x  / Bili: x / Urobili: x   Blood: x / Protein: x / Nitrite: x   Leuk Esterase: x / RBC: x / WBC x   Sq Epi: x / Non Sq Epi: x / Bacteria: x        RADIOLOGY & ADDITIONAL STUDIES

## 2024-04-05 NOTE — PROGRESS NOTE ADULT - ASSESSMENT
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 Smith presents with lower extremity pain. He was found to have a hematoma on the medial left thigh. The patient is hemodynamically stable with Hgb 12 g/dL and intact motor and sensory functions in the lower extremity. CTA demonstrating active subcutaneous and gracilis hematomas, large in size compared to CT performed four hours prior.    Recommendations:  - OR today for hematoma evacuation.  - Hold AC, CBC q6 to monitor H/H levels.  - Care per primary.    Discussed with vascular surgery fellow on behalf of Dr. Christie.    Vascular Surgery  [id number]

## 2024-04-05 NOTE — PROGRESS NOTE ADULT - SUBJECTIVE AND OBJECTIVE BOX
Neurology        S: patient seen. plan for OR today       Medications: MEDICATIONS  (STANDING):  atorvastatin 20 milliGRAM(s) Oral at bedtime  gabapentin 300 milliGRAM(s) Oral every 8 hours  lacosamide 150 milliGRAM(s) Oral two times a day  multivitamin 1 Tablet(s) Oral daily  polyethylene glycol 3350 17 Gram(s) Oral daily  senna 2 Tablet(s) Oral at bedtime  tamsulosin 0.4 milliGRAM(s) Oral at bedtime    MEDICATIONS  (PRN):  HYDROmorphone  Injectable 0.25 milliGRAM(s) IV Push four times a day PRN Moderate Pain (4 - 6)  HYDROmorphone  Injectable 0.5 milliGRAM(s) IV Push every 4 hours PRN Severe Pain (7 - 10)       Vitals:  Vital Signs Last 24 Hrs  T(C): 36.5 (05 Apr 2024 09:01), Max: 36.8 (04 Apr 2024 10:50)  T(F): 97.7 (05 Apr 2024 08:27), Max: 98.3 (04 Apr 2024 10:50)  HR: 92 (05 Apr 2024 09:01) (77 - 111)  BP: 128/71 (05 Apr 2024 09:01) (87/65 - 133/84)  BP(mean): 89 (05 Apr 2024 09:01) (71 - 104)  RR: 20 (05 Apr 2024 09:01) (10 - 24)  SpO2: 99% (05 Apr 2024 09:01) (94% - 99%)    Parameters below as of 05 Apr 2024 04:00  Patient On (Oxygen Delivery Method): room air              General Exam:   General Appearance: Appropriately dressed and in no acute distress       Head: Normocephalic, atraumatic and no dysmorphic features  Ear, Nose, and Throat: Moist mucous membranes  CVS: S1S2+  Resp: No SOB, no wheeze or rhonchi  GI: soft NT/ND  Extremities: No edema or cyanosis L leg thigh hematoma   Skin: No bruises or rashes     Neurological Exam:  Mental Status: Awake, alert and oriented x 3 minimal WFD .  Able to follow simple and complex verbal commands. Able to name and repeat. fluent speech. No obvious aphasia or dysarthria noted.   Cranial Nerves: PERRL, EOMI, VFFC, sensation V1-V3 intact,  no obvious facial asymmetry, equal elevation of palate, scm/trap 5/5, tongue is midline on protrusion. no obvious papilledema on fundoscopic exam. hearing is grossly intact.   Motor: Normal bulk, tone and strength throughout. Fine finger movements were intact and symmetric. no tremors or drift noted.    Sensation: Intact to light touch and pinprick throughout. no right/left confusion. no extinction to tactile on DSS.  .   Reflexes: 1+ throughout at biceps, brachioradialis, triceps, patellars and ankles bilaterally and equal. No clonus. R toe and L toe were both downgoing.  Coordination: No dysmetria on FNF    Gait: deferred     Data/Labs/Imaging which I personally reviewed.     Labs:     CBC Full  -  ( 04 Apr 2024 06:28 )  WBC Count : 12.28 K/uL  RBC Count : 4.14 M/uL  Hemoglobin : 12.4 g/dL  Hematocrit : 36.0 %  Platelet Count - Automated : 170 K/uL  Mean Cell Volume : 87.0 fl  Mean Cell Hemoglobin : 30.0 pg  Mean Cell Hemoglobin Concentration : 34.4 gm/dL  Auto Neutrophil # : 10.65 K/uL  Auto Lymphocyte # : 0.62 K/uL  Auto Monocyte # : 0.83 K/uL  Auto Eosinophil # : 0.03 K/uL  Auto Basophil # : 0.04 K/uL  Auto Neutrophil % : 86.8 %  Auto Lymphocyte % : 5.0 %  Auto Monocyte % : 6.8 %  Auto Eosinophil % : 0.2 %  Auto Basophil % : 0.3 %    04-04    140  |  103  |  16  ----------------------------<  100<H>  3.8   |  25  |  0.92    Ca    9.4      04 Apr 2024 01:17    TPro  7.3  /  Alb  4.2  /  TBili  0.2  /  DBili  x   /  AST  23  /  ALT  28  /  AlkPhos  85  04-04    LIVER FUNCTIONS - ( 04 Apr 2024 01:17 )  Alb: 4.2 g/dL / Pro: 7.3 g/dL / ALK PHOS: 85 U/L / ALT: 28 U/L / AST: 23 U/L / GGT: x           PT/INR - ( 04 Apr 2024 01:17 )   PT: 10.8 sec;   INR: 0.98 ratio         PTT - ( 04 Apr 2024 01:17 )  PTT:48.4 sec  Urinalysis Basic - ( 04 Apr 2024 01:17 )    Color: x / Appearance: x / SG: x / pH: x  Gluc: 100 mg/dL / Ketone: x  / Bili: x / Urobili: x   Blood: x / Protein: x / Nitrite: x   Leuk Esterase: x / RBC: x / WBC x   Sq Epi: x / Non Sq Epi: x / Bacteria: x

## 2024-04-05 NOTE — PRE-ANESTHESIA EVALUATION ADULT - NSANTHPMHFT_GEN_ALL_CORE
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 Patient reports he has been injecting lovenox to left thigh (last dose last night). In ED patient hypotensive and bradycardic, emergent release blood 2 units given with response in hemodynamics. CBC drawn after transfusion h/h 12.6/38.1. Vascular following. IR consulted for evaluation of angiogram/ embolization. Patient is currently hemodynamically stable.     Dx: Left thigh hematoma - s/p 2 U PRBC and protamine. Repeat cbc stable 12.4/36 from 	12.6/38.1

## 2024-04-05 NOTE — PROGRESS NOTE ADULT - ASSESSMENT
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 Noticed within the last hour. No falls or trauma to the area. Administered Lovenox just prior to arrival. Denies fevers, chills, nausea, vomiting, urinary symptoms. No recent surgeries  found left thigh hematoma   s/p 2 units of PRBC     Impression:   1) chronic strokes, resid minimal WFD  2) seizure d/o 2/2 strokes  3) ADHD  4) vertigo BPPV, stable   5) new L thigh hematoma   6) LLE compartment syndrome     - f/u IR and surgery regarding hematoma --> plan for OR today 4/5 with vascular surgery   - Lovenox now held.  was injecting in thigh at site of hematoma ; last admission Lovenox started for DVT and changed to BID dosing   - for ADHD gets Vyvanse 50mg daily  - for seizure he is on vimpat 150 mg BID  - ICU eval   - for secondary stroke prevention should be on asa 81mg daily and full dose lovenox . now held   - statin therapy with LDL goal < 70mg/dL; atorvastatin 20 at home   - meclizline PRN   - PT/OT   - check FS, glucose control <180  - GI/DVT ppx   - Thank you for allowing me to participate in the care of this patient. Call with questions.   Braxton Forde MD  Vascular Neurology  Office: [phone]

## 2024-04-05 NOTE — PROGRESS NOTE ADULT - ASSESSMENT
53y Male with pmhx CVA abd Hx of DVT's    Anemia  - patient anemic (Hb baseline 13-14) 2/2 hematoma; S/P PRBC 2U in ED.    - follow up IR and vascular recs  - transfuse for Hb>7  - monitor cbc    DVTs  - hx of bilateral DVTs (noted to resolve 1/24)  - hold lovenox and AC for now  - pt last seen Dr Varela on 12/2023  - monitor for now    MSK  - patient's LLE swollen with concern for compartment syndrome  - Interval increase in size ( 61-->71cm) overnight  - f/u ortho and vascular recs for management vs possible fasciotomy   - pain control with dilaudid 0.5, 1m  - as per Vascular, planned for evacuation of hematoma in OR this AM.     will follow    Meenakshi Diaz NP  Hematology/Oncology  [facility]  [phone] (office)

## 2024-04-05 NOTE — PROGRESS NOTE ADULT - SUBJECTIVE AND OBJECTIVE BOX
Patient is a 53y old  Male who presents with a chief complaint of Hematoma (05 Apr 2024 09:01)    Patient is seen and examined at the bedside.   Appears comfortable and no new complaints.       MEDICATIONS  (STANDING):  atorvastatin 20 milliGRAM(s) Oral at bedtime  gabapentin 300 milliGRAM(s) Oral every 8 hours  lacosamide 150 milliGRAM(s) Oral two times a day  multivitamin 1 Tablet(s) Oral daily  polyethylene glycol 3350 17 Gram(s) Oral daily  senna 2 Tablet(s) Oral at bedtime  tamsulosin 0.4 milliGRAM(s) Oral at bedtime    MEDICATIONS  (PRN):  HYDROmorphone  Injectable 0.5 milliGRAM(s) IV Push every 4 hours PRN Severe Pain (7 - 10)  HYDROmorphone  Injectable 0.25 milliGRAM(s) IV Push four times a day PRN Moderate Pain (4 - 6)      Vital Signs Last 24 Hrs  T(C): 36.5 (05 Apr 2024 09:01), Max: 36.8 (04 Apr 2024 10:50)  T(F): 97.7 (05 Apr 2024 08:27), Max: 98.3 (04 Apr 2024 10:50)  HR: 92 (05 Apr 2024 09:01) (77 - 111)  BP: 128/71 (05 Apr 2024 09:01) (87/65 - 133/84)  BP(mean): 89 (05 Apr 2024 09:01) (71 - 104)  RR: 20 (05 Apr 2024 09:01) (10 - 24)  SpO2: 99% (05 Apr 2024 09:01) (94% - 99%)    Parameters below as of 05 Apr 2024 04:00  Patient On (Oxygen Delivery Method): room air    PE  NAD  Awake, alert  Anicteric, MMM  RRR  CTAB  Abd soft, NT, ND  No c/c                        10.6   16.86 )-----------( 179      ( 05 Apr 2024 07:50 )             31.1       04-05    136  |  103  |  30<H>  ----------------------------<  185<H>  4.7   |  22  |  1.18    Ca    8.5      05 Apr 2024 03:34  Phos  4.4     04-05  Mg     2.0     04-05    TPro  6.0  /  Alb  3.4  /  TBili  0.2  /  DBili  x   /  AST  10  /  ALT  19  /  AlkPhos  65  04-05    CT of Angiogram lower extremities 4/5/24  FINDINGS:    BONES: No fracture or dislocation. Bone island in the left superior pubic   ramus.    SOFT TISSUES: Interval increase in size of an anteromedial upper thigh   subcutaneous hematoma now measuring 19.5 x 8.6 x 15.2, previously 15.4 x   5.9 x 15.0. The scattered foci of high attenuation are no longer seen.   Increased mass effect on the medial compartment of the thigh by the   subcutaneous hematoma. Interval increase in subcutaneous inflammatory   change and skin thickening in the subcutaneous tissues.    IMPRESSION:  1. Interval increase in size of an anteromedial left upper thigh   subcutaneous hematoma.  2. Interval increase in subcutaneous inflammatory changes and skin   thickening in the subcutaneous tissues.    Findings discussed between Davis Cardona MD and Carol Phipps MD on 7:41am   on 4/5/2024.

## 2024-04-05 NOTE — PROGRESS NOTE ADULT - SUBJECTIVE AND OBJECTIVE BOX
Consult Note: Orthopedic Surgery    Patient interviewed and examined at bedside, well-appearing and in no acute distress. Patient states pain is significantly improved today and attributes his improvement in part to the placement of a indwelling urethral catheter, which he states helped alleviate some of the pressure in his groin from the Left thigh hematoma. Of note, patient had Lovenox reversed with K Centra approximately 10 minutes after orthopedic evaluation with attending yesterday on 4/4/24. Per chart review, patient's hemoglobin downtrended from approximately that time until 0300 from ~12 to ~9, with an additional 1U pRBC administered. The patient continued to have some soft blood pressures overnight with tachycardia to the low 100s, with an increased in the size of the hematoma seen on a repeat CT Angio of the leg from this morning at approximately 06:00. Discussed with Vascular Surgery Resident in MICU, who communicated plan for Vascular Surgery to indicate patient for evacuation of hematoma later today.         VITALS:  T(C): 36.5 (05 Apr 2024 08:27), Max: 36.8 (04 Apr 2024 10:50)  T(F): 97.7 (05 Apr 2024 08:27), Max: 98.3 (04 Apr 2024 10:50)  HR: 92 (05 Apr 2024 08:27) (77 - 111)  BP: 128/71 (05 Apr 2024 08:27) (87/65 - 133/84)  BP(mean): 89 (05 Apr 2024 07:00) (71 - 104)  RR: 20 (05 Apr 2024 08:27) (10 - 24)  SpO2: 99% (05 Apr 2024 08:27) (94% - 99%)  O2 Parameters below as of 05 Apr 2024 04:00  Patient On (Oxygen Delivery Method): room air        LABS:                        10.6   16.86 )-----------( 179      ( 05 Apr 2024 07:50 )             31.1     136  |  103  |  30<H>  ----------------------------<  185<H>  4.7   |  22  |  1.18    Ca    8.5      05 Apr 2024 03:34  Phos  4.4     04-05  Mg     2.0     04-05    TPro  6.0  /  Alb  3.4  /  TBili  0.2  /  DBili  x   /  AST  10  /  ALT  19  /  AlkPhos  65  04-05    PT/INR - ( 05 Apr 2024 03:34 )   PT: 11.7 sec;   INR: 1.12 ratio      PTT - ( 05 Apr 2024 03:34 )  PTT:30.8 sec        PHYSICAL EXAM  Gen: NAD, Resting comfortably    LLE:  Large, firm, medially-situated swelling of the groin / proximal thigh consistent with hematoma, with scattered track marks consistent with patient's endorsed Lovenox injection sites, occupying a bed of diffuse ecchymoses; Comparable ecchymoses accompanied my minimal swelling noted on the contralateral side.   Prior, well-healed fasciotomy scars noted in over the Left calf. Of note, there is interval increase in size and firmness of the hematoma, now with medially-situated skin blistering.   Skin other intact, no erythema or ecchymosis.   Marked point tenderness noted over above-described area of swelling, mildly improved from yesterday's examination; No bony tenderness to palpation.    + EHL/FHL/TA/GSC.   + SILT L3-S1.   + DP.   Denies pain in the anterior compartment below the knee with passive plantarflexion.   Denies pain in the lateral compartment below the knee with passive inversion.   Denies pain in the posterior compartment below the knee with passive dorsiflexion.   (Note: Patient had endorses mild pain with passive plantarflexion, inversion, and dorsiflexion in the anterior, lateral, and posterior compartments, respectively, on prior examination and had endorsed these symptoms as consistent with his chronic baseline after his prior fasciotomy procedures).   No micromotion tenderness.   ALL compartments of the proximal (thigh) and distal (calf/shin) lower extremity soft and compressible; No concern for acute compartment syndrome.        SECONDARY SURVEY  RLE/RUE/LUE: No TTP over bony prominences, SILT, palpable pulses, full/painless range of motion, compartments soft  Spine: No bony tenderness. No palpable stepoffs.     IMAGING:  < from: CT Angio Lower Extremity w/ IV Cont, Left (04.05.24 @ 06:23) >  BONES: No fracture or dislocation. Bone island in the left superior pubic   ramus.    SOFT TISSUES: Interval increase in sizeof an anteromedial upper thigh   subcutaneous hematoma now measuring 19.5 x 8.6 x 15.2, previously 15.4 x   5.9 x 15.0. The scattered foci of high attenuation are no longer seen.   Increased mass effect on the medial compartment of the thigh by the   subcutaneous hematoma. Interval increase in subcutaneous inflammatory   change and skin thickening in the subcutaneous tissues.    IMPRESSION:  1. Interval increase in size of an anteromedial left upper thigh   subcutaneous hematoma.  2. Interval increase in subcutaneous inflammatory changes and skin   thickening in the subcutaneous tissues.    < end of copied text >        ASSESSMENT:  Patient is a 53M with a Left thigh hematoma suspected to be due to spontaneous bleeding from Lovenox injections; No current clinical concern for acute compartment syndrome in the thigh; Per Vascular Surgery, planned for evacuation of hematoma in OR on 4/5/24.     PLAN:  - Vascular Surgery plan noted as above.   - No acute orthopedic intervention indicated at this time.   - In context of benign compartment exam, no serial compartment examinations required at this time.   - Pain control.   - WBAT RLE.   - Will continue to monitor while admitted.  - Please re-consult in the setting of increased pain or analgesic requirements consistent with acute compartment syndrome.    - All questions answered to patient's satisfaction. Patient understands and agrees with plan.  - Will discuss with Dr. Epstein and advise of any changes to the above plan.

## 2024-04-05 NOTE — CHART NOTE - NSCHARTNOTEFT_GEN_A_CORE
Called EP team at [phone]. Stated that loop recorders do not need to be interrogated prior to OR. Will proceed with planned hematoma evacuation today.

## 2024-04-05 NOTE — DIETITIAN INITIAL EVALUATION ADULT - EDUCATION DIETARY MODIFICATIONS
Education deferred until pt PO. Pt aware of need for adequate protein-energy intake; amenable to nutrition supplement. Pt made aware RD to remain available.

## 2024-04-05 NOTE — PROGRESS NOTE ADULT - SUBJECTIVE AND OBJECTIVE BOX
Name of Patient : TAMI DUNHAM  MRN: [id number]  Date of visit: 04-05-24      Subjective: Patient seen and examined. No new events except as noted.     REVIEW OF SYSTEMS:    CONSTITUTIONAL: No weakness, fevers or chills  EYES/ENT: No visual changes;  No vertigo or throat pain   NECK: No pain or stiffness  RESPIRATORY: No cough, wheezing, hemoptysis; No shortness of breath  CARDIOVASCULAR: No chest pain or palpitations  GASTROINTESTINAL: No abdominal or epigastric pain. No nausea, vomiting, or hematemesis; No diarrhea or constipation. No melena or hematochezia.  GENITOURINARY: No dysuria, frequency or hematuria  NEUROLOGICAL: No numbness or weakness  SKIN: No itching, burning, rashes, or lesions   All other review of systems is negative unless indicated above.    MEDICATIONS:  MEDICATIONS  (STANDING):  atorvastatin 20 milliGRAM(s) Oral at bedtime  gabapentin 300 milliGRAM(s) Oral every 8 hours  lacosamide 150 milliGRAM(s) Oral two times a day  multivitamin 1 Tablet(s) Oral daily  polyethylene glycol 3350 17 Gram(s) Oral daily  senna 2 Tablet(s) Oral at bedtime  tamsulosin 0.4 milliGRAM(s) Oral at bedtime      PHYSICAL EXAM:  T(C): 37.1 (04-05-24 @ 19:00), Max: 37.2 (04-05-24 @ 12:35)  HR: 92 (04-05-24 @ 23:00) (83 - 111)  BP: 103/62 (04-05-24 @ 23:00) (87/65 - 128/71)  RR: 13 (04-05-24 @ 23:00) (13 - 24)  SpO2: 96% (04-05-24 @ 23:00) (94% - 100%)  Wt(kg): --  I&O's Summary    04 Apr 2024 07:01  -  05 Apr 2024 07:00  --------------------------------------------------------  IN: 1555 mL / OUT: 1225 mL / NET: 330 mL    05 Apr 2024 07:01  - 05 Apr 2024 23:16  --------------------------------------------------------  IN: 860 mL / OUT: 1442 mL / NET: -582 mL      Height (cm): 190.5 (04-05 @ 09:01)  Weight (kg): 98.2 (04-05 @ 09:01)  BMI (kg/m2): 27.1 (04-05 @ 09:01)  BSA (m2): 2.27 (04-05 @ 09:01)    Appearance: Normal	  HEENT:  PERRLA   Lymphatic: No lymphadenopathy   Cardiovascular: Normal S1 S2, no JVD  Respiratory: normal effort , clear  Gastrointestinal:  Soft, Non-tender  Skin: No rashes,  warm to touch  Psychiatry:  Mood & affect appropriate  Musculuskeletal: No edema    recent labs, Imaging and EKGs personally reviewed     04-04-24 @ 07:01  -  04-05-24 @ 07:00  --------------------------------------------------------  IN: 1555 mL / OUT: 1225 mL / NET: 330 mL    04-05-24 @ 07:01  -  04-05-24 @ 23:16  --------------------------------------------------------  IN: 860 mL / OUT: 1442 mL / NET: -582 mL                          8.5    18.21 )-----------( 165      ( 05 Apr 2024 19:19 )             25.0               04-05    135  |  101  |  28<H>  ----------------------------<  146<H>  4.7   |  23  |  1.08    Ca    7.5<L>      05 Apr 2024 12:49  Phos  4.3     04-05  Mg     2.0     04-05    TPro  6.0  /  Alb  3.4  /  TBili  0.2  /  DBili  x   /  AST  10  /  ALT  19  /  AlkPhos  65  04-05    PT/INR - ( 05 Apr 2024 03:34 )   PT: 11.7 sec;   INR: 1.12 ratio         PTT - ( 05 Apr 2024 03:34 )  PTT:30.8 sec       CARDIAC MARKERS ( 05 Apr 2024 12:49 )  x     / x     / 63 U/L / x     / x      CARDIAC MARKERS ( 05 Apr 2024 03:34 )  x     / x     / 49 U/L / x     / x      CARDIAC MARKERS ( 04 Apr 2024 21:36 )  x     / x     / 75 U/L / x     / x      CARDIAC MARKERS ( 04 Apr 2024 11:02 )  x     / x     / 149 U/L / x     / x                  Urinalysis Basic - ( 05 Apr 2024 12:49 )    Color: x / Appearance: x / SG: x / pH: x  Gluc: 146 mg/dL / Ketone: x  / Bili: x / Urobili: x   Blood: x / Protein: x / Nitrite: x   Leuk Esterase: x / RBC: x / WBC x   Sq Epi: x / Non Sq Epi: x / Bacteria: x

## 2024-04-05 NOTE — DIETITIAN INITIAL EVALUATION ADULT - ORAL INTAKE PTA/DIET HISTORY
Pt was eating well with no changes in appetite. Pt was not following therapeutic diet. Took multivitamin and drank Boost x1 daily. Denies Hx of chewing or swallowing issues. Confirms no known food allergies.

## 2024-04-05 NOTE — DIETITIAN INITIAL EVALUATION ADULT - ADD RECOMMEND
As medically feasible, continue to provide multivitamin, add Vitamin C for wound healing. Continue to trend labs, weight, skin integrity, and intake.

## 2024-04-05 NOTE — CHART NOTE - NSCHARTNOTEFT_GEN_A_CORE
Patient post operative from Left thigh hematoma evacuation with Vascular Surgery  No further needs from IR standpoint  Will sign off at this time  Please reconsult as necessary

## 2024-04-05 NOTE — PROGRESS NOTE ADULT - SUBJECTIVE AND OBJECTIVE BOX
PROGRESS NOTE:   Authored by Dr. Hitesh Ingram MD     Patient is a 53y old  Male who presents with a chief complaint of Left thigh hematoma (04 Apr 2024 15:24)      SUBJECTIVE / OVERNIGHT EVENTS:  Overnight, patient's hematoma was noted to be increasing in size (61--> 71cm) and received one unit pRBC. Patient has no acute complaints this morning.     MEDICATIONS  (STANDING):  atorvastatin 20 milliGRAM(s) Oral at bedtime  gabapentin 300 milliGRAM(s) Oral every 8 hours  lacosamide 150 milliGRAM(s) Oral two times a day  multivitamin 1 Tablet(s) Oral daily  polyethylene glycol 3350 17 Gram(s) Oral daily  senna 2 Tablet(s) Oral at bedtime  tamsulosin 0.4 milliGRAM(s) Oral at bedtime    MEDICATIONS  (PRN):  HYDROmorphone  Injectable 0.25 milliGRAM(s) IV Push four times a day PRN Moderate Pain (4 - 6)  HYDROmorphone  Injectable 0.5 milliGRAM(s) IV Push every 4 hours PRN Severe Pain (7 - 10)      CAPILLARY BLOOD GLUCOSE        I&O's Summary    04 Apr 2024 07:01  -  05 Apr 2024 07:00  --------------------------------------------------------  IN: 1435 mL / OUT: 1225 mL / NET: 210 mL        PHYSICAL EXAM:  Vital Signs Last 24 Hrs  T(C): 36.8 (05 Apr 2024 04:00), Max: 36.8 (04 Apr 2024 10:50)  T(F): 98.2 (05 Apr 2024 04:00), Max: 98.3 (04 Apr 2024 10:50)  HR: 90 (05 Apr 2024 06:09) (77 - 111)  BP: 102/66 (05 Apr 2024 06:09) (87/65 - 133/84)  BP(mean): 79 (05 Apr 2024 06:09) (71 - 104)  RR: 15 (05 Apr 2024 06:09) (10 - 24)  SpO2: 97% (05 Apr 2024 06:09) (94% - 99%)    Parameters below as of 05 Apr 2024 04:00  Patient On (Oxygen Delivery Method): room air        CONSTITUTIONAL: NAD  HEET: MMM, EOMI, PERRLA  NECK: supple  RESPIRATORY: Normal respiratory effort; lungs are clear to auscultation bilaterally  CARDIOVASCULAR: Regular rate and rhythm, normal S1 and S2, no murmur/rub/gallop; Peripheral pulses are 2+ bilaterally  ABDOMEN: Nontender to palpation, normoactive bowel sounds, no rebound/guarding; No hepatosplenomegaly  MUSCULOSKELETAL: LLE hematoma noted with echymosis  PSYCH: A+O to person, place, and time; affect appropriate  SKIN: No rash    LABS:                        9.3    16.09 )-----------( 183      ( 05 Apr 2024 03:34 )             27.7     04-05    136  |  103  |  30<H>  ----------------------------<  185<H>  4.7   |  22  |  1.18    Ca    8.5      05 Apr 2024 03:34  Phos  4.4     04-05  Mg     2.0     04-05    TPro  6.0  /  Alb  3.4  /  TBili  0.2  /  DBili  x   /  AST  10  /  ALT  19  /  AlkPhos  65  04-05    PT/INR - ( 05 Apr 2024 03:34 )   PT: 11.7 sec;   INR: 1.12 ratio         PTT - ( 05 Apr 2024 03:34 )  PTT:30.8 sec  CARDIAC MARKERS ( 05 Apr 2024 03:34 )  x     / x     / 49 U/L / x     / x      CARDIAC MARKERS ( 04 Apr 2024 21:36 )  x     / x     / 75 U/L / x     / x      CARDIAC MARKERS ( 04 Apr 2024 11:02 )  x     / x     / 149 U/L / x     / x          Urinalysis Basic - ( 05 Apr 2024 03:34 )    Color: x / Appearance: x / SG: x / pH: x  Gluc: 185 mg/dL / Ketone: x  / Bili: x / Urobili: x   Blood: x / Protein: x / Nitrite: x   Leuk Esterase: x / RBC: x / WBC x   Sq Epi: x / Non Sq Epi: x / Bacteria: x          Tele Reviewed:    RADIOLOGY & ADDITIONAL TESTS:  Results Reviewed:   Imaging Personally Reviewed:  Electrocardiogram Personally Reviewed:

## 2024-04-05 NOTE — CHART NOTE - NSCHARTNOTEFT_GEN_A_CORE
SURGERY POST OP CHECK    STATUS POST PROCEDURE:    SUBJECTIVE: Pt seen and examined without complaints. Pain is controlled. Denies CP/SOB/N/V.       OBJECTIVE:  Vital Signs Last 24 Hrs  T(C): 37.2 (05 Apr 2024 12:35), Max: 37.2 (05 Apr 2024 12:35)  T(F): 98.9 (05 Apr 2024 12:35), Max: 98.9 (05 Apr 2024 12:35)  HR: 83 (05 Apr 2024 14:00) (83 - 111)  BP: 112/74 (05 Apr 2024 14:00) (87/65 - 128/71)  BP(mean): 88 (05 Apr 2024 14:00) (71 - 98)  RR: 14 (05 Apr 2024 14:00) (11 - 24)  SpO2: 100% (05 Apr 2024 14:00) (96% - 100%)    Parameters below as of 05 Apr 2024 04:00  Patient On (Oxygen Delivery Method): room air      I&O's Summary    04 Apr 2024 07:01  -  05 Apr 2024 07:00  --------------------------------------------------------  IN: 1555 mL / OUT: 1225 mL / NET: 330 mL    05 Apr 2024 07:01  -  05 Apr 2024 16:23  --------------------------------------------------------  IN: 300 mL / OUT: 327 mL / NET: -27 mL        PHYSICAL EXAM:  Gen: NAD, A&Ox3  Pulm: No respiratory distress, no subcostal retractions  CV: RRR, no JVD  Abd: Soft, NT, ND  Drains: ASHLEY x 2 SS   Extremities: L thigh soft, dressing c/d/i. Palpable DP/PT.     ASSESSMENT/PLAN: HPI:  Patient is a 54 yo M with hx of CVA with residual expressive aphasia, 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 Otherwise, there was no trauma to the area nor falls. The pain is described as throbbing and does not radiate. He did not try any supportive measures and came to the hospital directly.    Now s/p evacuation of L thigh hematoma. Tolerated procedure well.     Plan   Trend H&H  compression ACE wrap LLE   Pain control   Rest of care per primary    Vascular Surgery   [id number]

## 2024-04-05 NOTE — DIETITIAN INITIAL EVALUATION ADULT - PERTINENT MEDS FT
MEDICATIONS  (STANDING):  atorvastatin 20 milliGRAM(s) Oral at bedtime  gabapentin 300 milliGRAM(s) Oral every 8 hours  lacosamide 150 milliGRAM(s) Oral two times a day  multivitamin 1 Tablet(s) Oral daily  polyethylene glycol 3350 17 Gram(s) Oral daily  senna 2 Tablet(s) Oral at bedtime  tamsulosin 0.4 milliGRAM(s) Oral at bedtime    MEDICATIONS  (PRN):  HYDROmorphone  Injectable 0.25 milliGRAM(s) IV Push four times a day PRN Moderate Pain (4 - 6)  HYDROmorphone  Injectable 0.5 milliGRAM(s) IV Push every 4 hours PRN Severe Pain (7 - 10)

## 2024-04-05 NOTE — DIETITIAN INITIAL EVALUATION ADULT - OTHER INFO
Wt Hx:   Dosing wt 98.2 kG/216.4 lbs.   UBW ~216 lbs (confirmed by HIE); denies any changes in wt PTA  Ht: 75 inches   IBW:  196 lbs   IBW%: 110%    Nutrition-Related Concerns:   - Ordered for multivitamin

## 2024-04-05 NOTE — PROGRESS NOTE ADULT - ASSESSMENT
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 On admission, vitals significant for hypotension and labs showing anemia with CTA showing large hematoma of the LLE and small RLE hematoma with repeat CTA showing interval increase of the hematoma and new small LLE hematoma s/p 2units pRBCs. He is now admitted to the MICU for further management.     NEURO:  Baseline: AOx3     #Seizure disorder  # CVA with residual expressive aphasia    PLAN  - cw home AEDs (lacosamide and vyanese); vyvanse not on formulary, encouraged patient to bring from home  - cw home gabapentin     CARDIOVASCULAR:  -Keep Mg>2, K>4, P>3    #HLD  -cw home atorvastatin      HEMODYNAMICS:  - patient hypotensive in ED s/p pRBCx2 and off pressors  - noted to have soft BP (4/5) ON that responded to blood transfusion       RESPIRATORY:  NEFTALI    GI/NUTRITION:  NEFTALI    Diet:   -Regular    RENAL/:  #BPH  -cw flomax       ID:   #leukocytosis  - noted to have elevated WBC  - likely inflammatory iso blood loss with low concern for infection  - trend WBC and fever curve    HEME:  #Anemia  - patient anemic (Hb baseline 13-14) 2/2 hematoma  - follow up IR and vascular recs  - Maintain active type and screen  - transfuse for Hb>7  - monitor cbc q4    #DVTs  - hx of bilateral DVTs (noted to resolve 1/24)  - hold lovenox and AC iso bleed  - consider IVC filter evaluation once clinical picture improves     MSK  - patient's LLE swollen with concern for compartment syndrome  - f/u ortho and vascular recs for management vs possible fasciotomy; low concern of compartment syndrome   - pain control with dilaudid 0.5, 1mg       ENDOCRINE:  NEFTALI    DVT PPX:  - SCDs    ETHICS:  full code

## 2024-04-06 ENCOUNTER — APPOINTMENT (OUTPATIENT)
Dept: MRI IMAGING | Facility: IMAGING CENTER | Age: 54
End: 2024-04-06

## 2024-04-06 LAB
ALBUMIN SERPL ELPH-MCNC: 2.9 G/DL — LOW (ref 3.3–5)
ALP SERPL-CCNC: 55 U/L — SIGNIFICANT CHANGE UP (ref 40–120)
ALT FLD-CCNC: 11 U/L — SIGNIFICANT CHANGE UP (ref 10–45)
ANION GAP SERPL CALC-SCNC: 8 MMOL/L — SIGNIFICANT CHANGE UP (ref 5–17)
APTT BLD: 22.7 SEC — LOW (ref 24.5–35.6)
AST SERPL-CCNC: 8 U/L — LOW (ref 10–40)
BILIRUB SERPL-MCNC: 0.2 MG/DL — SIGNIFICANT CHANGE UP (ref 0.2–1.2)
BUN SERPL-MCNC: 22 MG/DL — SIGNIFICANT CHANGE UP (ref 7–23)
CALCIUM SERPL-MCNC: 7.8 MG/DL — LOW (ref 8.4–10.5)
CHLORIDE SERPL-SCNC: 102 MMOL/L — SIGNIFICANT CHANGE UP (ref 96–108)
CO2 SERPL-SCNC: 25 MMOL/L — SIGNIFICANT CHANGE UP (ref 22–31)
CREAT SERPL-MCNC: 1 MG/DL — SIGNIFICANT CHANGE UP (ref 0.5–1.3)
EGFR: 90 ML/MIN/1.73M2 — SIGNIFICANT CHANGE UP
GLUCOSE SERPL-MCNC: 140 MG/DL — HIGH (ref 70–99)
HCT VFR BLD CALC: 21.2 % — LOW (ref 39–50)
HCT VFR BLD CALC: 21.6 % — LOW (ref 39–50)
HCT VFR BLD CALC: 22.7 % — LOW (ref 39–50)
HGB BLD-MCNC: 7.1 G/DL — LOW (ref 13–17)
HGB BLD-MCNC: 7.4 G/DL — LOW (ref 13–17)
HGB BLD-MCNC: 7.5 G/DL — LOW (ref 13–17)
INR BLD: 0.99 RATIO — SIGNIFICANT CHANGE UP (ref 0.85–1.18)
MAGNESIUM SERPL-MCNC: 2 MG/DL — SIGNIFICANT CHANGE UP (ref 1.6–2.6)
MCHC RBC-ENTMCNC: 29 PG — SIGNIFICANT CHANGE UP (ref 27–34)
MCHC RBC-ENTMCNC: 29.3 PG — SIGNIFICANT CHANGE UP (ref 27–34)
MCHC RBC-ENTMCNC: 30.2 PG — SIGNIFICANT CHANGE UP (ref 27–34)
MCHC RBC-ENTMCNC: 32.9 GM/DL — SIGNIFICANT CHANGE UP (ref 32–36)
MCHC RBC-ENTMCNC: 33 GM/DL — SIGNIFICANT CHANGE UP (ref 32–36)
MCHC RBC-ENTMCNC: 34.9 GM/DL — SIGNIFICANT CHANGE UP (ref 32–36)
MCV RBC AUTO: 86.5 FL — SIGNIFICANT CHANGE UP (ref 80–100)
MCV RBC AUTO: 87.6 FL — SIGNIFICANT CHANGE UP (ref 80–100)
MCV RBC AUTO: 89.3 FL — SIGNIFICANT CHANGE UP (ref 80–100)
NRBC # BLD: 0 /100 WBCS — SIGNIFICANT CHANGE UP (ref 0–0)
PHOSPHATE SERPL-MCNC: 2.4 MG/DL — LOW (ref 2.5–4.5)
PLATELET # BLD AUTO: 150 K/UL — SIGNIFICANT CHANGE UP (ref 150–400)
PLATELET # BLD AUTO: 151 K/UL — SIGNIFICANT CHANGE UP (ref 150–400)
PLATELET # BLD AUTO: 158 K/UL — SIGNIFICANT CHANGE UP (ref 150–400)
POTASSIUM SERPL-MCNC: 4.4 MMOL/L — SIGNIFICANT CHANGE UP (ref 3.5–5.3)
POTASSIUM SERPL-SCNC: 4.4 MMOL/L — SIGNIFICANT CHANGE UP (ref 3.5–5.3)
PROT SERPL-MCNC: 5.4 G/DL — LOW (ref 6–8.3)
PROTHROM AB SERPL-ACNC: 10.9 SEC — SIGNIFICANT CHANGE UP (ref 9.5–13)
RBC # BLD: 2.42 M/UL — LOW (ref 4.2–5.8)
RBC # BLD: 2.45 M/UL — LOW (ref 4.2–5.8)
RBC # BLD: 2.59 M/UL — LOW (ref 4.2–5.8)
RBC # FLD: 14 % — SIGNIFICANT CHANGE UP (ref 10.3–14.5)
RBC # FLD: 14.3 % — SIGNIFICANT CHANGE UP (ref 10.3–14.5)
RBC # FLD: 14.3 % — SIGNIFICANT CHANGE UP (ref 10.3–14.5)
SODIUM SERPL-SCNC: 135 MMOL/L — SIGNIFICANT CHANGE UP (ref 135–145)
WBC # BLD: 12.13 K/UL — HIGH (ref 3.8–10.5)
WBC # BLD: 14.2 K/UL — HIGH (ref 3.8–10.5)
WBC # BLD: 15.42 K/UL — HIGH (ref 3.8–10.5)
WBC # FLD AUTO: 12.13 K/UL — HIGH (ref 3.8–10.5)
WBC # FLD AUTO: 14.2 K/UL — HIGH (ref 3.8–10.5)
WBC # FLD AUTO: 15.42 K/UL — HIGH (ref 3.8–10.5)

## 2024-04-06 PROCEDURE — 99232 SBSQ HOSP IP/OBS MODERATE 35: CPT | Mod: GC

## 2024-04-06 PROCEDURE — 93970 EXTREMITY STUDY: CPT | Mod: 26

## 2024-04-06 RX ORDER — SODIUM,POTASSIUM PHOSPHATES 278-250MG
1 POWDER IN PACKET (EA) ORAL EVERY 6 HOURS
Refills: 0 | Status: COMPLETED | OUTPATIENT
Start: 2024-04-06 | End: 2024-04-06

## 2024-04-06 RX ADMIN — GABAPENTIN 300 MILLIGRAM(S): 400 CAPSULE ORAL at 05:40

## 2024-04-06 RX ADMIN — LACOSAMIDE 150 MILLIGRAM(S): 50 TABLET ORAL at 05:40

## 2024-04-06 RX ADMIN — GABAPENTIN 300 MILLIGRAM(S): 400 CAPSULE ORAL at 22:38

## 2024-04-06 RX ADMIN — TAMSULOSIN HYDROCHLORIDE 0.4 MILLIGRAM(S): 0.4 CAPSULE ORAL at 22:37

## 2024-04-06 RX ADMIN — Medication 1 PACKET(S): at 11:32

## 2024-04-06 RX ADMIN — GABAPENTIN 300 MILLIGRAM(S): 400 CAPSULE ORAL at 13:11

## 2024-04-06 RX ADMIN — POLYETHYLENE GLYCOL 3350 17 GRAM(S): 17 POWDER, FOR SOLUTION ORAL at 11:32

## 2024-04-06 RX ADMIN — SENNA PLUS 2 TABLET(S): 8.6 TABLET ORAL at 22:37

## 2024-04-06 RX ADMIN — Medication 1 TABLET(S): at 11:32

## 2024-04-06 RX ADMIN — LACOSAMIDE 150 MILLIGRAM(S): 50 TABLET ORAL at 17:25

## 2024-04-06 RX ADMIN — ATORVASTATIN CALCIUM 20 MILLIGRAM(S): 80 TABLET, FILM COATED ORAL at 22:37

## 2024-04-06 RX ADMIN — Medication 1 PACKET(S): at 05:40

## 2024-04-06 NOTE — PROGRESS NOTE ADULT - SUBJECTIVE AND OBJECTIVE BOX
Patient is a 53y old  Male who presents with a chief complaint of Hematoma (05 Apr 2024 09:01)    Patient is seen and examined at the bedside.   Appears comfortable and no new complaints.       MEDICATIONS  (STANDING):  atorvastatin 20 milliGRAM(s) Oral at bedtime  gabapentin 300 milliGRAM(s) Oral every 8 hours  lacosamide 150 milliGRAM(s) Oral two times a day  multivitamin 1 Tablet(s) Oral daily  polyethylene glycol 3350 17 Gram(s) Oral daily  senna 2 Tablet(s) Oral at bedtime  tamsulosin 0.4 milliGRAM(s) Oral at bedtime    MEDICATIONS  (PRN):  HYDROmorphone  Injectable 0.5 milliGRAM(s) IV Push every 4 hours PRN Severe Pain (7 - 10)  HYDROmorphone  Injectable 0.25 milliGRAM(s) IV Push four times a day PRN Moderate Pain (4 - 6)    Vital Signs Last 24 Hrs  T(C): 36.7 (06 Apr 2024 12:00), Max: 37.1 (05 Apr 2024 19:00)  T(F): 98.1 (06 Apr 2024 12:00), Max: 98.8 (05 Apr 2024 19:00)  HR: 88 (06 Apr 2024 14:00) (82 - 105)  BP: 123/63 (06 Apr 2024 14:00) (95/56 - 128/72)  BP(mean): 85 (06 Apr 2024 14:00) (69 - 91)  RR: 19 (06 Apr 2024 14:00) (13 - 24)  SpO2: 97% (06 Apr 2024 14:00) (94% - 99%)    Parameters below as of 06 Apr 2024 13:04  Patient On (Oxygen Delivery Method): room air        PE  NAD  Awake, alert  Anicteric, MMM  RRR  CTAB  Abd soft, NT, ND  No c/c                                  7.4    14.20 )-----------( 150      ( 06 Apr 2024 05:36 )             21.2   04-06    135  |  102  |  22  ----------------------------<  140<H>  4.4   |  25  |  1.00    Ca    7.8<L>      06 Apr 2024 00:50  Phos  2.4     04-06  Mg     2.0     04-06    TPro  5.4<L>  /  Alb  2.9<L>  /  TBili  0.2  /  DBili  x   /  AST  8<L>  /  ALT  11  /  AlkPhos  55  04-06

## 2024-04-06 NOTE — CHART NOTE - NSCHARTNOTEFT_GEN_A_CORE
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 On admission, vitals significant for hypotension (s/p 2units pRBC) and labs showing anemia with CTA showing large hematoma of the LLE and small RLE hematoma with repeat CTA showing interval increase of the hematoma.  He was seen by IR and they recommend conservative management with external wrapping and transfusions as needed. He was then admitted to the MICU for q1 hr neurovascular checks.     In the MICU, the patient was initially seen by Orthopedic surgery for concern of compartment syndrome, however there was low suspicion from their perspective and recommended no acute surgical intervention. He was then noted to have an increasing hematoma for which a repeat CTA LLE was obtained and was transfused one unit pRBC. He was then taken to the OR by Vascular surgery for hematoma evacuation.          For Follow-Up:  [ ] monitor hemoglobin   [ ] f/u repeat DVT study of lower extremities  [ ] f/u vascular surgery and orthopedic surgery recs  [ ] f/u IR for IVC filter evaluation               OBJECTIVE --  Vital Signs Last 24 Hrs  T(C): 36.7 (06 Apr 2024 12:00), Max: 37.1 (05 Apr 2024 19:00)  T(F): 98.1 (06 Apr 2024 12:00), Max: 98.8 (05 Apr 2024 19:00)  HR: 88 (06 Apr 2024 14:00) (82 - 105)  BP: 123/63 (06 Apr 2024 14:00) (95/56 - 128/72)  BP(mean): 85 (06 Apr 2024 14:00) (69 - 91)  RR: 19 (06 Apr 2024 14:00) (13 - 24)  SpO2: 97% (06 Apr 2024 14:00) (94% - 99%)    Parameters below as of 06 Apr 2024 13:04  Patient On (Oxygen Delivery Method): room air        I&O's Summary    05 Apr 2024 07:01  -  06 Apr 2024 07:00  --------------------------------------------------------  IN: 1100 mL / OUT: 1852 mL / NET: -752 mL    06 Apr 2024 07:01  -  06 Apr 2024 15:19  --------------------------------------------------------  IN: 200 mL / OUT: 595 mL / NET: -395 mL        MEDICATIONS  (STANDING):  atorvastatin 20 milliGRAM(s) Oral at bedtime  gabapentin 300 milliGRAM(s) Oral every 8 hours  lacosamide 150 milliGRAM(s) Oral two times a day  multivitamin 1 Tablet(s) Oral daily  polyethylene glycol 3350 17 Gram(s) Oral daily  senna 2 Tablet(s) Oral at bedtime  tamsulosin 0.4 milliGRAM(s) Oral at bedtime    MEDICATIONS  (PRN):  HYDROmorphone  Injectable 0.5 milliGRAM(s) IV Push every 4 hours PRN Severe Pain (7 - 10)  HYDROmorphone  Injectable 0.25 milliGRAM(s) IV Push four times a day PRN Moderate Pain (4 - 6)        LABS                                            7.4                   Neurophils% (auto):   x      (04-06 @ 05:36):    14.20)-----------(150          Lymphocytes% (auto):  x                                             21.2                   Eosinphils% (auto):   x        Manual%: Neutrophils x    ; Lymphocytes x    ; Eosinophils x    ; Bands%: x    ; Blasts x                                    135    |  102    |  22                  Calcium: 7.8   / iCa: x      (04-06 @ 00:50)    ----------------------------<  140       Magnesium: 2.0                              4.4     |  25     |  1.00             Phosphorous: 2.4      TPro  5.4    /  Alb  2.9    /  TBili  0.2    /  DBili  x      /  AST  8      /  ALT  11     /  AlkPhos  55     06 Apr 2024 00:50    ( 04-06 @ 00:50 )   PT: 10.9 sec;   INR: 0.99 ratio  aPTT: 22.7 sec          ASSESSMENT & PLAN: MICU Transfer Note  ---------------------------    Transfer from: MICU  Transfer to:  (  ) Medicine    (  ) Telemetry    (  ) RCU    (  ) Palliative    (  ) Stroke Unit    (  ) _______________  Accepting Physician:    53y Male with pmhx CVA with residual expressive aphasia, 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 On admission, vitals significant for hypotension (s/p 2units pRBC) and labs showing anemia with CTA showing large hematoma of the LLE and small RLE hematoma with repeat CTA showing interval increase of the hematoma.  He was seen by IR and they recommend conservative management with external wrapping and transfusions as needed. He was then admitted to the MICU for q1 hr neurovascular checks.     In the MICU, the patient was initially seen by Orthopedic surgery for concern of compartment syndrome, however there was low suspicion from their perspective and recommended no acute surgical intervention. He was then noted to have an increasing hematoma for which a repeat CTA LLE was obtained and was transfused one unit pRBC. He was then taken to the OR by Vascular surgery for hematoma evacuation.          For Follow-Up:  [ ] monitor hemoglobin, trend BID   [ ] f/u repeat DVT study of lower extremities  [ ] f/u vascular surgery and orthopedic surgery recs        OBJECTIVE --  Vital Signs Last 24 Hrs  T(C): 36.7 (06 Apr 2024 12:00), Max: 37.1 (05 Apr 2024 19:00)  T(F): 98.1 (06 Apr 2024 12:00), Max: 98.8 (05 Apr 2024 19:00)  HR: 88 (06 Apr 2024 14:00) (82 - 105)  BP: 123/63 (06 Apr 2024 14:00) (95/56 - 128/72)  BP(mean): 85 (06 Apr 2024 14:00) (69 - 91)  RR: 19 (06 Apr 2024 14:00) (13 - 24)  SpO2: 97% (06 Apr 2024 14:00) (94% - 99%)    Parameters below as of 06 Apr 2024 13:04  Patient On (Oxygen Delivery Method): room air        I&O's Summary    05 Apr 2024 07:01  -  06 Apr 2024 07:00  --------------------------------------------------------  IN: 1100 mL / OUT: 1852 mL / NET: -752 mL    06 Apr 2024 07:01  -  06 Apr 2024 15:19  --------------------------------------------------------  IN: 200 mL / OUT: 595 mL / NET: -395 mL        MEDICATIONS  (STANDING):  atorvastatin 20 milliGRAM(s) Oral at bedtime  gabapentin 300 milliGRAM(s) Oral every 8 hours  lacosamide 150 milliGRAM(s) Oral two times a day  multivitamin 1 Tablet(s) Oral daily  polyethylene glycol 3350 17 Gram(s) Oral daily  senna 2 Tablet(s) Oral at bedtime  tamsulosin 0.4 milliGRAM(s) Oral at bedtime    MEDICATIONS  (PRN):  HYDROmorphone  Injectable 0.5 milliGRAM(s) IV Push every 4 hours PRN Severe Pain (7 - 10)  HYDROmorphone  Injectable 0.25 milliGRAM(s) IV Push four times a day PRN Moderate Pain (4 - 6)        LABS                                            7.4                   Neurophils% (auto):   x      (04-06 @ 05:36):    14.20)-----------(150          Lymphocytes% (auto):  x                                             21.2                   Eosinphils% (auto):   x        Manual%: Neutrophils x    ; Lymphocytes x    ; Eosinophils x    ; Bands%: x    ; Blasts x                                    135    |  102    |  22                  Calcium: 7.8   / iCa: x      (04-06 @ 00:50)    ----------------------------<  140       Magnesium: 2.0                              4.4     |  25     |  1.00             Phosphorous: 2.4      TPro  5.4    /  Alb  2.9    /  TBili  0.2    /  DBili  x      /  AST  8      /  ALT  11     /  AlkPhos  55     06 Apr 2024 00:50    ( 04-06 @ 00:50 )   PT: 10.9 sec;   INR: 0.99 ratio  aPTT: 22.7 sec          ASSESSMENT & PLAN: MICU Transfer Note  ---------------------------    Transfer from: MICU  Transfer to:  (  ) Medicine    (  ) Telemetry    (  ) RCU    (  ) Palliative    (  ) Stroke Unit    (  ) _______________  Accepting Physician:    53y Male with pmhx CVA with residual expressive aphasia, 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 On admission, vitals significant for hypotension (s/p 2units pRBC) and labs showing anemia with CTA showing large hematoma of the LLE and small RLE hematoma with repeat CTA showing interval increase of the hematoma.  He was seen by IR and they recommend conservative management with external wrapping and transfusions as needed. He was then admitted to the MICU for q1 hr neurovascular checks.     In the MICU, the patient was initially seen by Orthopedic surgery for concern of compartment syndrome, however there was low suspicion from their perspective and recommended no acute surgical intervention. He was then noted to have an increasing hematoma for which a repeat CTA LLE was obtained and was transfused one unit pRBC. He was then taken to the OR by Vascular surgery for hematoma evacuation, with 2 ASHLEY drains in place. Walked with PT, and noted to have more blood around gauze. Required pRBC ovn on 4/6. DVT studies negative.         For Follow-Up:  [ ] monitor hemoglobin, trend BID   [ ] f/u vascular surgery and orthopedic surgery recs        OBJECTIVE --  Vital Signs Last 24 Hrs  T(C): 36.7 (06 Apr 2024 12:00), Max: 37.1 (05 Apr 2024 19:00)  T(F): 98.1 (06 Apr 2024 12:00), Max: 98.8 (05 Apr 2024 19:00)  HR: 88 (06 Apr 2024 14:00) (82 - 105)  BP: 123/63 (06 Apr 2024 14:00) (95/56 - 128/72)  BP(mean): 85 (06 Apr 2024 14:00) (69 - 91)  RR: 19 (06 Apr 2024 14:00) (13 - 24)  SpO2: 97% (06 Apr 2024 14:00) (94% - 99%)    Parameters below as of 06 Apr 2024 13:04  Patient On (Oxygen Delivery Method): room air        I&O's Summary    05 Apr 2024 07:01  -  06 Apr 2024 07:00  --------------------------------------------------------  IN: 1100 mL / OUT: 1852 mL / NET: -752 mL    06 Apr 2024 07:01  -  06 Apr 2024 15:19  --------------------------------------------------------  IN: 200 mL / OUT: 595 mL / NET: -395 mL        MEDICATIONS  (STANDING):  atorvastatin 20 milliGRAM(s) Oral at bedtime  gabapentin 300 milliGRAM(s) Oral every 8 hours  lacosamide 150 milliGRAM(s) Oral two times a day  multivitamin 1 Tablet(s) Oral daily  polyethylene glycol 3350 17 Gram(s) Oral daily  senna 2 Tablet(s) Oral at bedtime  tamsulosin 0.4 milliGRAM(s) Oral at bedtime    MEDICATIONS  (PRN):  HYDROmorphone  Injectable 0.5 milliGRAM(s) IV Push every 4 hours PRN Severe Pain (7 - 10)  HYDROmorphone  Injectable 0.25 milliGRAM(s) IV Push four times a day PRN Moderate Pain (4 - 6)        LABS                                            7.4                   Neurophils% (auto):   x      (04-06 @ 05:36):    14.20)-----------(150          Lymphocytes% (auto):  x                                             21.2                   Eosinphils% (auto):   x        Manual%: Neutrophils x    ; Lymphocytes x    ; Eosinophils x    ; Bands%: x    ; Blasts x                                    135    |  102    |  22                  Calcium: 7.8   / iCa: x      (04-06 @ 00:50)    ----------------------------<  140       Magnesium: 2.0                              4.4     |  25     |  1.00             Phosphorous: 2.4      TPro  5.4    /  Alb  2.9    /  TBili  0.2    /  DBili  x      /  AST  8      /  ALT  11     /  AlkPhos  55     06 Apr 2024 00:50    ( 04-06 @ 00:50 )   PT: 10.9 sec;   INR: 0.99 ratio  aPTT: 22.7 sec          ASSESSMENT & PLAN: MICU Transfer Note  ---------------------------    Transfer from: MICU  Transfer to:  (  ) Medicine    (  ) Telemetry    (  ) RCU    (  ) Palliative    (  ) Stroke Unit    (  ) _______________  Accepting Physician:    53y Male with pmhx CVA with residual expressive aphasia, 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 On admission, vitals significant for hypotension (s/p 2units pRBC) and labs showing anemia with CTA showing large hematoma of the LLE and small RLE hematoma with repeat CTA showing interval increase of the hematoma.  He was seen by IR and they recommend conservative management with external wrapping and transfusions as needed. He was then admitted to the MICU for q1 hr neurovascular checks.     In the MICU, the patient was initially seen by Orthopedic surgery for concern of compartment syndrome, however there was low suspicion from their perspective and recommended no acute surgical intervention. He was then noted to have an increasing hematoma for which a repeat CTA LLE was obtained and was transfused one unit pRBC. He was then taken to the OR by Vascular surgery for hematoma evacuation, with 2 ASHLEY drains in place. Walked with PT, and noted to have more blood around gauze. Required pRBC ovn on 4/6. DVT studies negative.         For Follow-Up:  [ ] monitor hemoglobin, trend BID   [ ] continue to hold AC  [ ] f/u vascular surgery and orthopedic surgery recs  [ ] daily dressing changes by vascular          OBJECTIVE --  Vital Signs Last 24 Hrs  T(C): 36.7 (06 Apr 2024 12:00), Max: 37.1 (05 Apr 2024 19:00)  T(F): 98.1 (06 Apr 2024 12:00), Max: 98.8 (05 Apr 2024 19:00)  HR: 88 (06 Apr 2024 14:00) (82 - 105)  BP: 123/63 (06 Apr 2024 14:00) (95/56 - 128/72)  BP(mean): 85 (06 Apr 2024 14:00) (69 - 91)  RR: 19 (06 Apr 2024 14:00) (13 - 24)  SpO2: 97% (06 Apr 2024 14:00) (94% - 99%)    Parameters below as of 06 Apr 2024 13:04  Patient On (Oxygen Delivery Method): room air        I&O's Summary    05 Apr 2024 07:01  -  06 Apr 2024 07:00  --------------------------------------------------------  IN: 1100 mL / OUT: 1852 mL / NET: -752 mL    06 Apr 2024 07:01  -  06 Apr 2024 15:19  --------------------------------------------------------  IN: 200 mL / OUT: 595 mL / NET: -395 mL        MEDICATIONS  (STANDING):  atorvastatin 20 milliGRAM(s) Oral at bedtime  gabapentin 300 milliGRAM(s) Oral every 8 hours  lacosamide 150 milliGRAM(s) Oral two times a day  multivitamin 1 Tablet(s) Oral daily  polyethylene glycol 3350 17 Gram(s) Oral daily  senna 2 Tablet(s) Oral at bedtime  tamsulosin 0.4 milliGRAM(s) Oral at bedtime    MEDICATIONS  (PRN):  HYDROmorphone  Injectable 0.5 milliGRAM(s) IV Push every 4 hours PRN Severe Pain (7 - 10)  HYDROmorphone  Injectable 0.25 milliGRAM(s) IV Push four times a day PRN Moderate Pain (4 - 6)        LABS                                            7.4                   Neurophils% (auto):   x      (04-06 @ 05:36):    14.20)-----------(150          Lymphocytes% (auto):  x                                             21.2                   Eosinphils% (auto):   x        Manual%: Neutrophils x    ; Lymphocytes x    ; Eosinophils x    ; Bands%: x    ; Blasts x                                    135    |  102    |  22                  Calcium: 7.8   / iCa: x      (04-06 @ 00:50)    ----------------------------<  140       Magnesium: 2.0                              4.4     |  25     |  1.00             Phosphorous: 2.4      TPro  5.4    /  Alb  2.9    /  TBili  0.2    /  DBili  x      /  AST  8      /  ALT  11     /  AlkPhos  55     06 Apr 2024 00:50    ( 04-06 @ 00:50 )   PT: 10.9 sec;   INR: 0.99 ratio  aPTT: 22.7 sec          ASSESSMENT & PLAN: MICU Transfer Note  ---------------------------    Transfer from: MICU  Transfer to:  (x) Medicine    (  ) Telemetry    (  ) RCU    (  ) Palliative    (  ) Stroke Unit    (  ) _______________  Accepting Physician: Aydinjenae    53y Male with pmhx CVA with residual expressive aphasia, 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 On admission, vitals significant for hypotension (s/p 2units pRBC) and labs showing anemia with CTA showing large hematoma of the LLE and small RLE hematoma with repeat CTA showing interval increase of the hematoma.  He was seen by IR and they recommend conservative management with external wrapping and transfusions as needed. He was then admitted to the MICU for q1 hr neurovascular checks.     In the MICU, the patient was initially seen by Orthopedic surgery for concern of compartment syndrome, however there was low suspicion from their perspective and recommended no acute surgical intervention. He was then noted to have an increasing hematoma for which a repeat CTA LLE was obtained and was transfused one unit pRBC. He was then taken to the OR by Vascular surgery for hematoma evacuation, with 2 ASHLEY drains in place. Walked with PT, and noted to have more blood around gauze. Required pRBC ovn on 4/6. DVT studies negative.         For Follow-Up:  [ ] monitor hemoglobin, trend BID   [ ] continue to hold AC  [ ] f/u vascular surgery and orthopedic surgery recs  [ ] daily dressing changes by vascular          OBJECTIVE --  Vital Signs Last 24 Hrs  T(C): 36.7 (06 Apr 2024 12:00), Max: 37.1 (05 Apr 2024 19:00)  T(F): 98.1 (06 Apr 2024 12:00), Max: 98.8 (05 Apr 2024 19:00)  HR: 88 (06 Apr 2024 14:00) (82 - 105)  BP: 123/63 (06 Apr 2024 14:00) (95/56 - 128/72)  BP(mean): 85 (06 Apr 2024 14:00) (69 - 91)  RR: 19 (06 Apr 2024 14:00) (13 - 24)  SpO2: 97% (06 Apr 2024 14:00) (94% - 99%)    Parameters below as of 06 Apr 2024 13:04  Patient On (Oxygen Delivery Method): room air        I&O's Summary    05 Apr 2024 07:01  -  06 Apr 2024 07:00  --------------------------------------------------------  IN: 1100 mL / OUT: 1852 mL / NET: -752 mL    06 Apr 2024 07:01  -  06 Apr 2024 15:19  --------------------------------------------------------  IN: 200 mL / OUT: 595 mL / NET: -395 mL        MEDICATIONS  (STANDING):  atorvastatin 20 milliGRAM(s) Oral at bedtime  gabapentin 300 milliGRAM(s) Oral every 8 hours  lacosamide 150 milliGRAM(s) Oral two times a day  multivitamin 1 Tablet(s) Oral daily  polyethylene glycol 3350 17 Gram(s) Oral daily  senna 2 Tablet(s) Oral at bedtime  tamsulosin 0.4 milliGRAM(s) Oral at bedtime    MEDICATIONS  (PRN):  HYDROmorphone  Injectable 0.5 milliGRAM(s) IV Push every 4 hours PRN Severe Pain (7 - 10)  HYDROmorphone  Injectable 0.25 milliGRAM(s) IV Push four times a day PRN Moderate Pain (4 - 6)        LABS                                            7.4                   Neurophils% (auto):   x      (04-06 @ 05:36):    14.20)-----------(150          Lymphocytes% (auto):  x                                             21.2                   Eosinphils% (auto):   x        Manual%: Neutrophils x    ; Lymphocytes x    ; Eosinophils x    ; Bands%: x    ; Blasts x                                    135    |  102    |  22                  Calcium: 7.8   / iCa: x      (04-06 @ 00:50)    ----------------------------<  140       Magnesium: 2.0                              4.4     |  25     |  1.00             Phosphorous: 2.4      TPro  5.4    /  Alb  2.9    /  TBili  0.2    /  DBili  x      /  AST  8      /  ALT  11     /  AlkPhos  55     06 Apr 2024 00:50    ( 04-06 @ 00:50 )   PT: 10.9 sec;   INR: 0.99 ratio  aPTT: 22.7 sec          ASSESSMENT & PLAN: MICU Transfer Note  ---------------------------    Transfer from: MICU  Transfer to:  (x) Medicine    (  ) Telemetry    (  ) RCU    (  ) Palliative    (  ) Stroke Unit    (  ) _______________  Accepting Physician: Dr. 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 On admission, vitals significant for hypotension (s/p 2units pRBC) and labs showing anemia with CTA showing large hematoma of the LLE and small RLE hematoma with repeat CTA showing interval increase of the hematoma.  He was seen by IR and they recommend conservative management with external wrapping and transfusions as needed. He was then admitted to the MICU for q1 hr neurovascular checks.     In the MICU, the patient was initially seen by Orthopedic surgery for concern of compartment syndrome, however there was low suspicion from their perspective and recommended no acute surgical intervention. He was then noted to have an increasing hematoma for which a repeat CTA LLE was obtained and was transfused one unit pRBC. He was then taken to the OR by Vascular surgery for hematoma evacuation, with 2 ASHLEY drains in place. Walked with PT, and noted to have more blood around gauze. Required pRBC ovn on 4/6. DVT studies negative.         For Follow-Up:  [ ] monitor hemoglobin, trend BID   [ ] continue to hold AC  [ ] f/u vascular surgery and orthopedic surgery recs  [ ] daily dressing changes by vascular          OBJECTIVE --  Vital Signs Last 24 Hrs  T(C): 36.7 (06 Apr 2024 12:00), Max: 37.1 (05 Apr 2024 19:00)  T(F): 98.1 (06 Apr 2024 12:00), Max: 98.8 (05 Apr 2024 19:00)  HR: 88 (06 Apr 2024 14:00) (82 - 105)  BP: 123/63 (06 Apr 2024 14:00) (95/56 - 128/72)  BP(mean): 85 (06 Apr 2024 14:00) (69 - 91)  RR: 19 (06 Apr 2024 14:00) (13 - 24)  SpO2: 97% (06 Apr 2024 14:00) (94% - 99%)    Parameters below as of 06 Apr 2024 13:04  Patient On (Oxygen Delivery Method): room air        I&O's Summary    05 Apr 2024 07:01  -  06 Apr 2024 07:00  --------------------------------------------------------  IN: 1100 mL / OUT: 1852 mL / NET: -752 mL    06 Apr 2024 07:01  -  06 Apr 2024 15:19  --------------------------------------------------------  IN: 200 mL / OUT: 595 mL / NET: -395 mL        MEDICATIONS  (STANDING):  atorvastatin 20 milliGRAM(s) Oral at bedtime  gabapentin 300 milliGRAM(s) Oral every 8 hours  lacosamide 150 milliGRAM(s) Oral two times a day  multivitamin 1 Tablet(s) Oral daily  polyethylene glycol 3350 17 Gram(s) Oral daily  senna 2 Tablet(s) Oral at bedtime  tamsulosin 0.4 milliGRAM(s) Oral at bedtime    MEDICATIONS  (PRN):  HYDROmorphone  Injectable 0.5 milliGRAM(s) IV Push every 4 hours PRN Severe Pain (7 - 10)  HYDROmorphone  Injectable 0.25 milliGRAM(s) IV Push four times a day PRN Moderate Pain (4 - 6)        LABS                                            7.4                   Neurophils% (auto):   x      (04-06 @ 05:36):    14.20)-----------(150          Lymphocytes% (auto):  x                                             21.2                   Eosinphils% (auto):   x        Manual%: Neutrophils x    ; Lymphocytes x    ; Eosinophils x    ; Bands%: x    ; Blasts x                                    135    |  102    |  22                  Calcium: 7.8   / iCa: x      (04-06 @ 00:50)    ----------------------------<  140       Magnesium: 2.0                              4.4     |  25     |  1.00             Phosphorous: 2.4      TPro  5.4    /  Alb  2.9    /  TBili  0.2    /  DBili  x      /  AST  8      /  ALT  11     /  AlkPhos  55     06 Apr 2024 00:50    ( 04-06 @ 00:50 )   PT: 10.9 sec;   INR: 0.99 ratio  aPTT: 22.7 sec          ASSESSMENT & PLAN:

## 2024-04-06 NOTE — PROGRESS NOTE ADULT - ASSESSMENT
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 On admission, vitals significant for hypotension and labs showing anemia with CTA showing large hematoma of the LLE and small RLE hematoma with repeat CTA showing interval increase of the hematoma and new small LLE hematoma s/p 2units pRBCs. He is now admitted to the MICU for further management.     NEURO:  Baseline: AOx3     #Seizure disorder  # CVA with residual expressive aphasia    PLAN  - cw home AEDs (lacosamide and vyanese); vyvanse not on formulary, encouraged patient to bring from home  - cw home gabapentin     CARDIOVASCULAR:  -Keep Mg>2, K>4, P>3    #HLD  -cw home atorvastatin      HEMODYNAMICS:  - patient hypotensive in ED s/p pRBCx2 and off pressors  - noted to have soft BP (4/5) ON that responded to blood transfusion       RESPIRATORY:  NEFTALI    GI/NUTRITION:  NEFTALI    Diet:   -Regular    RENAL/:  #BPH  -cw flomax       ID:   #leukocytosis  - noted to have elevated WBC  - likely inflammatory iso blood loss with low concern for infection  - trend WBC and fever curve    HEME:  #Anemia  - patient anemic (Hb baseline 13-14) 2/2 hematoma  - follow up IR and vascular recs  - Maintain active type and screen  - transfuse for Hb>7  - monitor cbc q6    #DVTs  - hx of bilateral DVTs (noted to resolve 1/24)  - hold lovenox and AC iso bleed  - consider IVC filter evaluation once clinical picture improves     MSK  - patient's LLE swollen with concern for compartment syndrome  - f/u ortho and vascular recs for management vs possible fasciotomy; low concern of compartment syndrome   - s/p hematoma evacuation by vascular 4/5  - pain control with dilaudid 0.25, 0.5mg       ENDOCRINE:  NEFTALI    DVT PPX:  - SCDs    ETHICS:  full code   ASSESSMENT AND PLAN:    53y Male with pmhx CVA with residual expressive aphasia, 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 On admission, vitals significant for hypotension and labs showing anemia with CTA showing large hematoma of the LLE and small RLE hematoma with repeat CTA showing interval increase of the hematoma and new small LLE hematoma s/p 2units pRBCs. He is now admitted to the MICU for further management.     NEURO:  Baseline: AOx3     #Seizure disorder  # CVA with residual expressive aphasia    PLAN  - cw home AEDs (lacosamide and vyanese); vyvanse not on formulary, encouraged patient to bring from home  - cw home gabapentin     CARDIOVASCULAR:  -Keep Mg>2, K>4, P>3    #HLD  -cw home atorvastatin      HEMODYNAMICS:  - patient hypotensive in ED s/p pRBCx2 and off pressors  - noted to have soft BP (4/5) ON that responded to blood transfusion       RESPIRATORY:  NEFTALI    GI/NUTRITION:  NEFTALI    Diet:   -Regular    RENAL/:  #BPH  -cw flomax       ID:   #leukocytosis  - noted to have elevated WBC  - likely inflammatory iso blood loss with low concern for infection  - monitor off antibiotics   - trend WBC and fever curve    HEME:  #Anemia  - patient anemic (Hb baseline 13-14) 2/2 hematoma  - follow up IR and vascular recs  - Maintain active type and screen  - transfuse for Hb>7  - monitor cbc q6    #DVTs  - hx of bilateral DVTs (noted to resolve 1/24)  - hold lovenox and AC iso bleed  - consider IVC filter evaluation once clinical picture improves     MSK  - patient's LLE swollen with concern for compartment syndrome  - f/u ortho and vascular recs for management vs possible fasciotomy; low concern of compartment syndrome   - s/p hematoma evacuation by vascular 4/5  - pain control with dilaudid 0.25, 0.5mg       ENDOCRINE:  NEFTALI    DVT PPX:  - SCDs    ETHICS:  full code

## 2024-04-06 NOTE — PROGRESS NOTE ADULT - ASSESSMENT
53y Male with pmhx CVA abd Hx of DVT's    Anemia  - patient anemic (Hb baseline 13-14) 2/2 hematoma; S/P PRBC 2U in ED.    - follow up IR and vascular recs  - transfuse for Hb>7  - monitor cbc    DVTs  - hx of bilateral DVTs (noted to resolve 1/24)  - hold lovenox and AC for now due to large hematoma  - pt last seen Dr Varela on 12/2023  - monitor for now    MSK  - patient's LLE swollen with concern for compartment syndrome  - Interval increase in size ( 61-->71cm) overnight  - f/u ortho and vascular recs for management for possible fasciotomy   - pain control with dilaudid 0.5, 1m  - as per Vascular s/p evacuation of hematoma in OR 4/5.   - overall improved condition.    will follow

## 2024-04-06 NOTE — PROGRESS NOTE ADULT - SUBJECTIVE AND OBJECTIVE BOX
Name of Patient : TAMI DUNHAM  MRN: [id number]  Date of visit: 04-06-24 @ 15:48      Subjective: Patient seen and examined. No new events except as noted.   Patient seen earlier this AM. Remains in MICU   S/P Hematoma evacuation on 4/5   Reports pain is controlled    REVIEW OF SYSTEMS:    CONSTITUTIONAL: Generalized weakness   EYES/ENT: No visual changes;  No vertigo or throat pain   NECK: No pain or stiffness  RESPIRATORY: No cough, wheezing, hemoptysis; No shortness of breath  CARDIOVASCULAR: No chest pain or palpitations  GASTROINTESTINAL: No abdominal or epigastric pain. No nausea, vomiting, or hematemesis; No diarrhea or constipation. No melena or hematochezia.  GENITOURINARY: No dysuria, frequency or hematuria  NEUROLOGICAL: No numbness or weakness  SKIN: + S/P Hematoma evacuation; No itching, burning, rashes, or lesions   All other review of systems is negative unless indicated above.    MEDICATIONS:  MEDICATIONS  (STANDING):  atorvastatin 20 milliGRAM(s) Oral at bedtime  gabapentin 300 milliGRAM(s) Oral every 8 hours  lacosamide 150 milliGRAM(s) Oral two times a day  multivitamin 1 Tablet(s) Oral daily  polyethylene glycol 3350 17 Gram(s) Oral daily  senna 2 Tablet(s) Oral at bedtime  tamsulosin 0.4 milliGRAM(s) Oral at bedtime      PHYSICAL EXAM:  T(C): 36.7 (04-06-24 @ 12:00), Max: 37.1 (04-05-24 @ 19:00)  HR: 86 (04-06-24 @ 15:00) (82 - 105)  BP: 111/61 (04-06-24 @ 15:00) (95/56 - 128/72)  RR: 14 (04-06-24 @ 15:00) (13 - 24)  SpO2: 96% (04-06-24 @ 15:00) (94% - 99%)  Wt(kg): --  I&O's Summary    05 Apr 2024 07:01  -  06 Apr 2024 07:00  --------------------------------------------------------  IN: 1100 mL / OUT: 1852 mL / NET: -752 mL    06 Apr 2024 07:01  -  06 Apr 2024 15:48  --------------------------------------------------------  IN: 200 mL / OUT: 720 mL / NET: -520 mL      Height (cm): 190.5 (04-06 @ 07:32)  Weight (kg): 98.2 (04-06 @ 07:32)  BMI (kg/m2): 27.1 (04-06 @ 07:32)  BSA (m2): 2.27 (04-06 @ 07:32)    Appearance: Awake, sitting up in bed, generalized weakness 	  HEENT:  Eyes are open   Lymphatic: No lymphadenopathy   Cardiovascular: Normal S1 S2, no JVD  Respiratory: normal effort , clear  Gastrointestinal:  Soft, Non-tender  Skin: No rashes,  warm to touch  Psychiatry:  Mood & affect appropriate  Musculoskeletal: LLE Edema             04-05-24 @ 07:01  -  04-06-24 @ 07:00  --------------------------------------------------------  IN: 1100 mL / OUT: 1852 mL / NET: -752 mL    04-06-24 @ 07:01  -  04-06-24 @ 15:48  --------------------------------------------------------  IN: 200 mL / OUT: 720 mL / NET: -520 mL                                7.4    14.20 )-----------( 150      ( 06 Apr 2024 05:36 )             21.2               04-06    135  |  102  |  22  ----------------------------<  140<H>  4.4   |  25  |  1.00    Ca    7.8<L>      06 Apr 2024 00:50  Phos  2.4     04-06  Mg     2.0     04-06    TPro  5.4<L>  /  Alb  2.9<L>  /  TBili  0.2  /  DBili  x   /  AST  8<L>  /  ALT  11  /  AlkPhos  55  04-06    PT/INR - ( 06 Apr 2024 00:50 )   PT: 10.9 sec;   INR: 0.99 ratio         PTT - ( 06 Apr 2024 00:50 )  PTT:22.7 sec       CARDIAC MARKERS ( 05 Apr 2024 12:49 )  x     / x     / 63 U/L / x     / x      CARDIAC MARKERS ( 05 Apr 2024 03:34 )  x     / x     / 49 U/L / x     / x      CARDIAC MARKERS ( 04 Apr 2024 21:36 )  x     / x     / 75 U/L / x     / x                  Urinalysis Basic - ( 06 Apr 2024 00:50 )    Color: x / Appearance: x / SG: x / pH: x  Gluc: 140 mg/dL / Ketone: x  / Bili: x / Urobili: x   Blood: x / Protein: x / Nitrite: x   Leuk Esterase: x / RBC: x / WBC x   Sq Epi: x / Non Sq Epi: x / Bacteria: x

## 2024-04-06 NOTE — PHYSICAL THERAPY INITIAL EVALUATION ADULT - GENERAL OBSERVATIONS, REHAB EVAL
Pt a/w sudden onset L thigh swelling, hx CVA with word finding difficulty, +LLE residual weakness, CTA showing large hematoma of the LLE and small RLE hematoma with repeat CTA showing interval increase of the hematoma and new small LLE hematoma s/p 2units pRBCs; pt is now s/p LLE thigh hematoma evacuation with vascular on 4/5/24. H&H 7.4/21.2. Cleared for PT eval by MICU team, MD Sethi, RN Carlito. Pt received supine in bed, +ICU monitoring, +fish, +ASHLEY x2 LLE, +c/d/i dressing to L upper thigh/groin.

## 2024-04-06 NOTE — PHYSICAL THERAPY INITIAL EVALUATION ADULT - IMPAIRMENTS CONTRIBUTING IMPAIRED BED MOBILITY, REHAB EVAL
decr endurance/impaired balance/cognition/impaired coordination/pain/impaired postural control/decreased strength

## 2024-04-06 NOTE — PROGRESS NOTE ADULT - SUBJECTIVE AND OBJECTIVE BOX
Neurology        S: patient seen. doing well s/p hematoma evacuation.  plan for transfer to floor       Medications: MEDICATIONS  (STANDING):  atorvastatin 20 milliGRAM(s) Oral at bedtime  gabapentin 300 milliGRAM(s) Oral every 8 hours  lacosamide 150 milliGRAM(s) Oral two times a day  multivitamin 1 Tablet(s) Oral daily  polyethylene glycol 3350 17 Gram(s) Oral daily  senna 2 Tablet(s) Oral at bedtime  tamsulosin 0.4 milliGRAM(s) Oral at bedtime    MEDICATIONS  (PRN):  HYDROmorphone  Injectable 0.25 milliGRAM(s) IV Push four times a day PRN Moderate Pain (4 - 6)  HYDROmorphone  Injectable 0.5 milliGRAM(s) IV Push every 4 hours PRN Severe Pain (7 - 10)       Vitals:  Vital Signs Last 24 Hrs  T(C): 36.6 (06 Apr 2024 16:00), Max: 36.9 (05 Apr 2024 23:00)  T(F): 97.8 (06 Apr 2024 16:00), Max: 98.4 (05 Apr 2024 23:00)  HR: 89 (06 Apr 2024 18:00) (81 - 96)  BP: 115/65 (06 Apr 2024 18:00) (95/56 - 128/72)  BP(mean): 86 (06 Apr 2024 18:00) (69 - 91)  RR: 21 (06 Apr 2024 18:00) (13 - 24)  SpO2: 100% (06 Apr 2024 18:00) (94% - 100%)    Parameters below as of 06 Apr 2024 13:04  Patient On (Oxygen Delivery Method): room air                    General Exam:   General Appearance: Appropriately dressed and in no acute distress       Head: Normocephalic, atraumatic and no dysmorphic features  Ear, Nose, and Throat: Moist mucous membranes  CVS: S1S2+  Resp: No SOB, no wheeze or rhonchi  GI: soft NT/ND  Extremities: No edema or cyanosis L leg thigh hematoma   Skin: No bruises or rashes     Neurological Exam:  Mental Status: Awake, alert and oriented x 3 minimal WFD .  Able to follow simple and complex verbal commands. Able to name and repeat. fluent speech. No obvious aphasia or dysarthria noted.   Cranial Nerves: PERRL, EOMI, VFFC, sensation V1-V3 intact,  no obvious facial asymmetry, equal elevation of palate, scm/trap 5/5, tongue is midline on protrusion. no obvious papilledema on fundoscopic exam. hearing is grossly intact.   Motor: Normal bulk, tone and strength throughout. Fine finger movements were intact and symmetric. no tremors or drift noted.    Sensation: Intact to light touch and pinprick throughout. no right/left confusion. no extinction to tactile on DSS.  .   Reflexes: 1+ throughout at biceps, brachioradialis, triceps, patellars and ankles bilaterally and equal. No clonus. R toe and L toe were both downgoing.  Coordination: No dysmetria on FNF    Gait: deferred     Data/Labs/Imaging which I personally reviewed.         LABS:                          7.1    12.13 )-----------( 158      ( 06 Apr 2024 18:24 )             21.6     04-06    135  |  102  |  22  ----------------------------<  140<H>  4.4   |  25  |  1.00    Ca    7.8<L>      06 Apr 2024 00:50  Phos  2.4     04-06  Mg     2.0     04-06    TPro  5.4<L>  /  Alb  2.9<L>  /  TBili  0.2  /  DBili  x   /  AST  8<L>  /  ALT  11  /  AlkPhos  55  04-06    LIVER FUNCTIONS - ( 06 Apr 2024 00:50 )  Alb: 2.9 g/dL / Pro: 5.4 g/dL / ALK PHOS: 55 U/L / ALT: 11 U/L / AST: 8 U/L / GGT: x           PT/INR - ( 06 Apr 2024 00:50 )   PT: 10.9 sec;   INR: 0.99 ratio         PTT - ( 06 Apr 2024 00:50 )  PTT:22.7 sec  Urinalysis Basic - ( 06 Apr 2024 00:50 )    Color: x / Appearance: x / SG: x / pH: x  Gluc: 140 mg/dL / Ketone: x  / Bili: x / Urobili: x   Blood: x / Protein: x / Nitrite: x   Leuk Esterase: x / RBC: x / WBC x   Sq Epi: x / Non Sq Epi: x / Bacteria: x

## 2024-04-06 NOTE — PHYSICAL THERAPY INITIAL EVALUATION ADULT - LEVEL OF INDEPENDENCE: SIT/STAND, REHAB EVAL
held 2* oozing/bleeding at dressing; assisted back to bed for dressing change held 2* oozing/bleeding at dressing; assisted back to bed for dressing change. 4/7: [place]/minimum assist (75% patients effort)

## 2024-04-06 NOTE — PHYSICAL THERAPY INITIAL EVALUATION ADULT - PLANNED THERAPY INTERVENTIONS, PT EVAL
stair neg: GOAL: pt will neg 8 steps 1 HR ind in 4wks./bed mobility training/gait training/strengthening/transfer training

## 2024-04-06 NOTE — PHYSICAL THERAPY INITIAL EVALUATION ADULT - PERTINENT HX OF CURRENT PROBLEM, REHAB EVAL
Pt is a 52 yo M admitted to 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 Otherwise, there was no trauma to the area nor falls. The pain is described as throbbing and does not radiate. He did not try any supportive measures and came to the hospital directly. Of note, the pt was on eliquis when he was found to have bilateral DVTs, however this was changed to lovenox when he had a CVA while on eliquis. Hospital course: On admission, the pt's vitals were noted to be within normal limits, however he then became hypotensive for which he received 2u pRBCs and protamine sulfate. His labs were significant for anemia and leukocytosis and a CTA showed large hematoma in the LLE and small hematoma of the RLE. He is now admitted to the MICU for further management. CTA LLE: Enlarging left anteromedial upper thigh subcutaneous hematoma with increased mass effect on the medial compartment musculature. No evidence of arterial contrast extravasation on this exam. pt hypotensive in ED s/p pRBCx2 and off pressors, noted to have soft BP (4/5) ON that responded to blood transfusion, s/p hematoma evacuation by vascular 4/5. Per ortho, Left thigh hematoma suspected to be due to spontaneous bleeding from Lovenox injections; No current clinical concern for acute compartment syndrome in the thigh; Per Vascular Surgery, planned for evacuation of hematoma in OR on 4/5/24.

## 2024-04-06 NOTE — PROGRESS NOTE ADULT - ATTENDING COMMENTS
54 y/o M w/b/l DVTs on AC, prior CVA w/residual aphasia, seizure disorder, admitted with spontaneous L thigh hematoma. CTA w/large hematoma w/continued extravasation. S/p evacuation with removal of 1L clot 4/5    - on neurovascular ch  - Trend CBC, transfuse PRN for hgb < 7  - Hold therapeutic AC, consult for IVC filter placement as patient cannot currently receive AC  - Continue home 54 y/o M w/b/l DVTs on AC, prior CVA w/residual aphasia, seizure disorder, admitted with spontaneous L thigh hematoma. CTA w/large hematoma w/continued extravasation. S/p evacuation with removal of 1L clot 4/5    - on neurovascular checks, will discuss with vascular regarding spacing. okay to transition to floors if stable from vascular standpoint  - Trend CBC, transfuse PRN for hgb < 7. Currently stable  - holding therapeutic AC, repeat duplex as of january without clot. Plan for repeat duplex now, if persistent clot and unable to reinitiate AC in reasonable time may consider IVCF  - Continue home AED  - on vyvanse at home

## 2024-04-06 NOTE — PROGRESS NOTE ADULT - ASSESSMENT
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 Smith presents with lower extremity pain. He was found to have a hematoma on the medial left thigh. The patient is hemodynamically stable with Hgb 12 g/dL and intact motor and sensory functions in the lower extremity. CTA demonstrating active subcutaneous and gracilis hematomas, large in size compared to CT performed four hours prior. S/p OR on 4/5 for LLE hematoma evacuation.    Recommendations:  - Daily dressing changes.  - Hold AC, continue to monitor H/H.  - Care per primary.    Discussed with vascular surgery fellow on behalf of Dr. Christie.    Vascular Surgery  [id number]

## 2024-04-06 NOTE — PHYSICAL THERAPY INITIAL EVALUATION ADULT - ADDITIONAL COMMENTS
Pt lives alone in a basement apartment, 8 steps down to enter +[place]. Pt ambulates independently with RW. Independent ADLs Pt lives alone in a basement apartment, 8 steps down to enter +[place], +1st floor basement apartment. Pt ambulates independently with RW. Independent ADLs, +stall walkin shower, +drives, progressed to OPT, +RH, wears glasses for reading, friends check in to assist/groceries at times.

## 2024-04-06 NOTE — PROGRESS NOTE ADULT - SUBJECTIVE AND OBJECTIVE BOX
GENERAL SURGERY PROGRESS NOTE    SUBJECTIVE  No acute issues overnight. Seen and examined on morning rounds. Reports LLE feeling better. Denies LLE numbness, weakness, or pain.    10-point review of systems completed and negative except as noted above.      OBJECTIVE    MEDICATIONS  atorvastatin 20 milliGRAM(s) Oral at bedtime  gabapentin 300 milliGRAM(s) Oral every 8 hours  HYDROmorphone  Injectable 0.25 milliGRAM(s) IV Push four times a day PRN  HYDROmorphone  Injectable 0.5 milliGRAM(s) IV Push every 4 hours PRN  lacosamide 150 milliGRAM(s) Oral two times a day  multivitamin 1 Tablet(s) Oral daily  polyethylene glycol 3350 17 Gram(s) Oral daily  senna 2 Tablet(s) Oral at bedtime  tamsulosin 0.4 milliGRAM(s) Oral at bedtime      PHYSICAL EXAM  T(C): 36.7 (04-06-24 @ 12:00), Max: 37.1 (04-05-24 @ 19:00)  HR: 88 (04-06-24 @ 14:00) (82 - 105)  BP: 123/63 (04-06-24 @ 14:00) (95/56 - 128/72)  RR: 19 (04-06-24 @ 14:00) (13 - 24)  SpO2: 97% (04-06-24 @ 14:00) (94% - 99%)    04-05-24 @ 07:01  -  04-06-24 @ 07:00  --------------------------------------------------------  IN: 1100 mL / OUT: 1852 mL / NET: -752 mL    04-06-24 @ 07:01  -  04-06-24 @ 14:27  --------------------------------------------------------  IN: 200 mL / OUT: 595 mL / NET: -395 mL      General: Not acutely distressed  Neuro: AO x4  Respiratory: nonlabored respirations  CV: pulse present   Extremities: warm. LLE medial thigh with superficially denuded skin surrounding incision, incision c/d/i, sutures in place, no drainage    LABS                        7.4    14.20 )-----------( 150      ( 06 Apr 2024 05:36 )             21.2     04-06    135  |  102  |  22  ----------------------------<  140<H>  4.4   |  25  |  1.00    Ca    7.8<L>      06 Apr 2024 00:50  Phos  2.4     04-06  Mg     2.0     04-06    TPro  5.4<L>  /  Alb  2.9<L>  /  TBili  0.2  /  DBili  x   /  AST  8<L>  /  ALT  11  /  AlkPhos  55  04-06    PT/INR - ( 06 Apr 2024 00:50 )   PT: 10.9 sec;   INR: 0.99 ratio         PTT - ( 06 Apr 2024 00:50 )  PTT:22.7 sec  Urinalysis Basic - ( 06 Apr 2024 00:50 )    Color: x / Appearance: x / SG: x / pH: x  Gluc: 140 mg/dL / Ketone: x  / Bili: x / Urobili: x   Blood: x / Protein: x / Nitrite: x   Leuk Esterase: x / RBC: x / WBC x   Sq Epi: x / Non Sq Epi: x / Bacteria: x        RADIOLOGY & ADDITIONAL STUDIES

## 2024-04-06 NOTE — PROGRESS NOTE ADULT - SUBJECTIVE AND OBJECTIVE BOX
PROGRESS NOTE:   Authored by Dr. Hitesh Ingram MD     Patient is a 53y old  Male who presents with a chief complaint of Hematoma (05 Apr 2024 11:16)      SUBJECTIVE / OVERNIGHT EVENTS:  No acute events overnight. Patient has no acute complaints this morning.     MEDICATIONS  (STANDING):  atorvastatin 20 milliGRAM(s) Oral at bedtime  gabapentin 300 milliGRAM(s) Oral every 8 hours  lacosamide 150 milliGRAM(s) Oral two times a day  multivitamin 1 Tablet(s) Oral daily  polyethylene glycol 3350 17 Gram(s) Oral daily  potassium phosphate / sodium phosphate Powder (PHOS-NaK) 1 Packet(s) Oral every 6 hours  senna 2 Tablet(s) Oral at bedtime  tamsulosin 0.4 milliGRAM(s) Oral at bedtime    MEDICATIONS  (PRN):  HYDROmorphone  Injectable 0.25 milliGRAM(s) IV Push four times a day PRN Moderate Pain (4 - 6)  HYDROmorphone  Injectable 0.5 milliGRAM(s) IV Push every 4 hours PRN Severe Pain (7 - 10)      CAPILLARY BLOOD GLUCOSE        I&O's Summary    05 Apr 2024 07:01  -  06 Apr 2024 07:00  --------------------------------------------------------  IN: 1100 mL / OUT: 1852 mL / NET: -752 mL        PHYSICAL EXAM:  Vital Signs Last 24 Hrs  T(C): 36.6 (06 Apr 2024 03:00), Max: 37.2 (05 Apr 2024 12:35)  T(F): 97.9 (06 Apr 2024 03:00), Max: 98.9 (05 Apr 2024 12:35)  HR: 83 (06 Apr 2024 06:00) (83 - 105)  BP: 109/61 (06 Apr 2024 06:00) (95/56 - 128/71)  BP(mean): 76 (06 Apr 2024 06:00) (69 - 98)  RR: 24 (06 Apr 2024 06:00) (13 - 24)  SpO2: 96% (06 Apr 2024 06:00) (94% - 100%)    Parameters below as of 06 Apr 2024 03:00  Patient On (Oxygen Delivery Method): room air        CONSTITUTIONAL: NAD  HEET: MMM, EOMI, PERRLA  NECK: supple  RESPIRATORY: Normal respiratory effort; lungs are clear to auscultation bilaterally  CARDIOVASCULAR: Regular rate and rhythm, normal S1 and S2, no murmur/rub/gallop; No lower extremity edema; Peripheral pulses are 2+ bilaterally  ABDOMEN: Nontender to palpation, normoactive bowel sounds, no rebound/guarding; No hepatosplenomegaly  MUSCULOSKELETAL: L thigh swelling noted   PSYCH: A+O to person, place, and time; affect appropriate  SKIN: No rash    LABS:                        7.4    14.20 )-----------( 150      ( 06 Apr 2024 05:36 )             21.2     04-06    135  |  102  |  22  ----------------------------<  140<H>  4.4   |  25  |  1.00    Ca    7.8<L>      06 Apr 2024 00:50  Phos  2.4     04-06  Mg     2.0     04-06    TPro  5.4<L>  /  Alb  2.9<L>  /  TBili  0.2  /  DBili  x   /  AST  8<L>  /  ALT  11  /  AlkPhos  55  04-06    PT/INR - ( 06 Apr 2024 00:50 )   PT: 10.9 sec;   INR: 0.99 ratio         PTT - ( 06 Apr 2024 00:50 )  PTT:22.7 sec  CARDIAC MARKERS ( 05 Apr 2024 12:49 )  x     / x     / 63 U/L / x     / x      CARDIAC MARKERS ( 05 Apr 2024 03:34 )  x     / x     / 49 U/L / x     / x      CARDIAC MARKERS ( 04 Apr 2024 21:36 )  x     / x     / 75 U/L / x     / x      CARDIAC MARKERS ( 04 Apr 2024 11:02 )  x     / x     / 149 U/L / x     / x          Urinalysis Basic - ( 06 Apr 2024 00:50 )    Color: x / Appearance: x / SG: x / pH: x  Gluc: 140 mg/dL / Ketone: x  / Bili: x / Urobili: x   Blood: x / Protein: x / Nitrite: x   Leuk Esterase: x / RBC: x / WBC x   Sq Epi: x / Non Sq Epi: x / Bacteria: x          Tele Reviewed:    RADIOLOGY & ADDITIONAL TESTS:  Results Reviewed:   Imaging Personally Reviewed:  Electrocardiogram Personally Reviewed:

## 2024-04-06 NOTE — PROGRESS NOTE ADULT - ASSESSMENT
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 On admission, vitals significant for hypotension and labs showing anemia with CTA showing large hematoma of the LLE and small RLE hematoma with repeat CTA showing interval increase of the hematoma and new small LLE hematoma s/p 2units pRBCs. He is now admitted to the MICU for further management.       # LE Hematoma, Anemia   - AC on hold   - S/P PRBCs x2  - S/P OR with vascular surgery for hematoma evacuation on 4/5  - Pulse checks per protocol  - Monitor LE closely  - Serial CBC, Maintain active T/S, transfuse for Hgb < 7.0  - IR, Vascular, MICU care appreciated   - Per vascular surgery     #leukocytosis  - noted to have elevated WBC  - likely inflammatory iso blood loss with low concern for infection  - trend WBC and fever curve  - If febrile, check pan cultures    #Seizure disorder  # CVA with residual expressive aphasia  - C/w home AEDs (lacosamide and vyanese   - C/w home gabapentin   - Seizure precautions  - Neuro eval appreciated; F/u recs     #HLD  - C/w home Lipitor    #BPH  - C/w flomax    #DVTs  - hx of bilateral DVTs (noted to resolve 1/24)  - hold lovenox and AC iso bleed      Discussed with Attending   Care per MICU

## 2024-04-06 NOTE — PROGRESS NOTE ADULT - ASSESSMENT
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 Noticed within the last hour. No falls or trauma to the area. Administered Lovenox just prior to arrival. Denies fevers, chills, nausea, vomiting, urinary symptoms. No recent surgeries  found left thigh hematoma   s/p 2 units of PRBC   s/p hematoma evacuation 4/5, now doing well    Impression:   1) chronic strokes, resid minimal WFD  2) seizure d/o 2/2 strokes  3) ADHD  4) vertigo BPPV, stable   5) new L thigh hematoma   6) LLE compartment syndrome      - plan for tx to floor   - may need IVC filter   - Lovenox now held.  was injecting in thigh at site of hematoma ; last admission Lovenox started for DVT and changed to BID dosing   - for ADHD gets Vyvanse 50mg daily  - for seizure he is on vimpat 150 mg BID  - for secondary stroke prevention should be on asa 81mg daily and full dose lovenox . now held ;   - statin therapy with LDL goal < 70mg/dL; atorvastatin 20 at home   - meclizline PRN   - PT/OT   - check FS, glucose control <180  - GI/DVT ppx   - Thank you for allowing me to participate in the care of this patient. Call with questions.   Braxton Forde MD  Vascular Neurology  Office: [phone]

## 2024-04-07 LAB
ALBUMIN SERPL ELPH-MCNC: 3.2 G/DL — LOW (ref 3.3–5)
ALP SERPL-CCNC: 56 U/L — SIGNIFICANT CHANGE UP (ref 40–120)
ALT FLD-CCNC: 12 U/L — SIGNIFICANT CHANGE UP (ref 10–45)
ANION GAP SERPL CALC-SCNC: 9 MMOL/L — SIGNIFICANT CHANGE UP (ref 5–17)
APTT BLD: 24.7 SEC — SIGNIFICANT CHANGE UP (ref 24.5–35.6)
AST SERPL-CCNC: 14 U/L — SIGNIFICANT CHANGE UP (ref 10–40)
BASE EXCESS BLDV CALC-SCNC: 1 MMOL/L — SIGNIFICANT CHANGE UP (ref -2–3)
BILIRUB SERPL-MCNC: 0.3 MG/DL — SIGNIFICANT CHANGE UP (ref 0.2–1.2)
BLD GP AB SCN SERPL QL: NEGATIVE — SIGNIFICANT CHANGE UP
BUN SERPL-MCNC: 20 MG/DL — SIGNIFICANT CHANGE UP (ref 7–23)
CA-I SERPL-SCNC: 1.13 MMOL/L — LOW (ref 1.15–1.33)
CALCIUM SERPL-MCNC: 7.6 MG/DL — LOW (ref 8.4–10.5)
CHLORIDE BLDV-SCNC: 103 MMOL/L — SIGNIFICANT CHANGE UP (ref 96–108)
CHLORIDE SERPL-SCNC: 104 MMOL/L — SIGNIFICANT CHANGE UP (ref 96–108)
CO2 BLDV-SCNC: 29 MMOL/L — HIGH (ref 22–26)
CO2 SERPL-SCNC: 26 MMOL/L — SIGNIFICANT CHANGE UP (ref 22–31)
CREAT SERPL-MCNC: 0.98 MG/DL — SIGNIFICANT CHANGE UP (ref 0.5–1.3)
EGFR: 92 ML/MIN/1.73M2 — SIGNIFICANT CHANGE UP
GAS PNL BLDV: 136 MMOL/L — SIGNIFICANT CHANGE UP (ref 136–145)
GAS PNL BLDV: SIGNIFICANT CHANGE UP
GAS PNL BLDV: SIGNIFICANT CHANGE UP
GLUCOSE BLDV-MCNC: 96 MG/DL — SIGNIFICANT CHANGE UP (ref 70–99)
GLUCOSE SERPL-MCNC: 104 MG/DL — HIGH (ref 70–99)
HCO3 BLDV-SCNC: 27 MMOL/L — SIGNIFICANT CHANGE UP (ref 22–29)
HCT VFR BLD CALC: 20.9 % — CRITICAL LOW (ref 39–50)
HCT VFR BLD CALC: 21.7 % — LOW (ref 39–50)
HCT VFR BLD CALC: 22.5 % — LOW (ref 39–50)
HCT VFR BLD CALC: 22.9 % — LOW (ref 39–50)
HCT VFR BLDA CALC: 21 % — CRITICAL LOW (ref 39–51)
HGB BLD CALC-MCNC: 6.9 G/DL — CRITICAL LOW (ref 12.6–17.4)
HGB BLD-MCNC: 6.9 G/DL — CRITICAL LOW (ref 13–17)
HGB BLD-MCNC: 7.3 G/DL — LOW (ref 13–17)
HGB BLD-MCNC: 7.5 G/DL — LOW (ref 13–17)
HGB BLD-MCNC: 7.5 G/DL — LOW (ref 13–17)
INR BLD: 0.92 RATIO — SIGNIFICANT CHANGE UP (ref 0.85–1.18)
LACTATE BLDV-MCNC: 1.6 MMOL/L — SIGNIFICANT CHANGE UP (ref 0.5–2)
MAGNESIUM SERPL-MCNC: 2.2 MG/DL — SIGNIFICANT CHANGE UP (ref 1.6–2.6)
MCHC RBC-ENTMCNC: 29.6 PG — SIGNIFICANT CHANGE UP (ref 27–34)
MCHC RBC-ENTMCNC: 29.6 PG — SIGNIFICANT CHANGE UP (ref 27–34)
MCHC RBC-ENTMCNC: 29.8 PG — SIGNIFICANT CHANGE UP (ref 27–34)
MCHC RBC-ENTMCNC: 30.3 PG — SIGNIFICANT CHANGE UP (ref 27–34)
MCHC RBC-ENTMCNC: 32.8 GM/DL — SIGNIFICANT CHANGE UP (ref 32–36)
MCHC RBC-ENTMCNC: 33 GM/DL — SIGNIFICANT CHANGE UP (ref 32–36)
MCHC RBC-ENTMCNC: 33.3 GM/DL — SIGNIFICANT CHANGE UP (ref 32–36)
MCHC RBC-ENTMCNC: 33.6 GM/DL — SIGNIFICANT CHANGE UP (ref 32–36)
MCV RBC AUTO: 88.9 FL — SIGNIFICANT CHANGE UP (ref 80–100)
MCV RBC AUTO: 89.7 FL — SIGNIFICANT CHANGE UP (ref 80–100)
MCV RBC AUTO: 90 FL — SIGNIFICANT CHANGE UP (ref 80–100)
MCV RBC AUTO: 90.9 FL — SIGNIFICANT CHANGE UP (ref 80–100)
NRBC # BLD: 0 /100 WBCS — SIGNIFICANT CHANGE UP (ref 0–0)
PCO2 BLDV: 53 MMHG — SIGNIFICANT CHANGE UP (ref 42–55)
PH BLDV: 7.32 — SIGNIFICANT CHANGE UP (ref 7.32–7.43)
PHOSPHATE SERPL-MCNC: 2.8 MG/DL — SIGNIFICANT CHANGE UP (ref 2.5–4.5)
PLATELET # BLD AUTO: 159 K/UL — SIGNIFICANT CHANGE UP (ref 150–400)
PLATELET # BLD AUTO: 164 K/UL — SIGNIFICANT CHANGE UP (ref 150–400)
PLATELET # BLD AUTO: 173 K/UL — SIGNIFICANT CHANGE UP (ref 150–400)
PLATELET # BLD AUTO: 175 K/UL — SIGNIFICANT CHANGE UP (ref 150–400)
PO2 BLDV: 25 MMHG — SIGNIFICANT CHANGE UP (ref 25–45)
POTASSIUM BLDV-SCNC: 4.3 MMOL/L — SIGNIFICANT CHANGE UP (ref 3.5–5.1)
POTASSIUM SERPL-MCNC: 4.4 MMOL/L — SIGNIFICANT CHANGE UP (ref 3.5–5.3)
POTASSIUM SERPL-SCNC: 4.4 MMOL/L — SIGNIFICANT CHANGE UP (ref 3.5–5.3)
PROT SERPL-MCNC: 5.8 G/DL — LOW (ref 6–8.3)
PROTHROM AB SERPL-ACNC: 9.7 SEC — SIGNIFICANT CHANGE UP (ref 9.5–13)
RBC # BLD: 2.33 M/UL — LOW (ref 4.2–5.8)
RBC # BLD: 2.41 M/UL — LOW (ref 4.2–5.8)
RBC # BLD: 2.52 M/UL — LOW (ref 4.2–5.8)
RBC # BLD: 2.53 M/UL — LOW (ref 4.2–5.8)
RBC # FLD: 13.9 % — SIGNIFICANT CHANGE UP (ref 10.3–14.5)
RBC # FLD: 14.3 % — SIGNIFICANT CHANGE UP (ref 10.3–14.5)
RBC # FLD: 14.3 % — SIGNIFICANT CHANGE UP (ref 10.3–14.5)
RBC # FLD: 14.4 % — SIGNIFICANT CHANGE UP (ref 10.3–14.5)
RH IG SCN BLD-IMP: NEGATIVE — SIGNIFICANT CHANGE UP
SAO2 % BLDV: 36.3 % — LOW (ref 67–88)
SODIUM SERPL-SCNC: 139 MMOL/L — SIGNIFICANT CHANGE UP (ref 135–145)
WBC # BLD: 10.03 K/UL — SIGNIFICANT CHANGE UP (ref 3.8–10.5)
WBC # BLD: 10.24 K/UL — SIGNIFICANT CHANGE UP (ref 3.8–10.5)
WBC # BLD: 11.24 K/UL — HIGH (ref 3.8–10.5)
WBC # BLD: 9.24 K/UL — SIGNIFICANT CHANGE UP (ref 3.8–10.5)
WBC # FLD AUTO: 10.03 K/UL — SIGNIFICANT CHANGE UP (ref 3.8–10.5)
WBC # FLD AUTO: 10.24 K/UL — SIGNIFICANT CHANGE UP (ref 3.8–10.5)
WBC # FLD AUTO: 11.24 K/UL — HIGH (ref 3.8–10.5)
WBC # FLD AUTO: 9.24 K/UL — SIGNIFICANT CHANGE UP (ref 3.8–10.5)

## 2024-04-07 RX ORDER — BACITRACIN ZINC 500 UNIT/G
1 OINTMENT IN PACKET (EA) TOPICAL DAILY
Refills: 0 | Status: DISCONTINUED | OUTPATIENT
Start: 2024-04-07 | End: 2024-04-19

## 2024-04-07 RX ORDER — ACETAMINOPHEN 500 MG
650 TABLET ORAL EVERY 6 HOURS
Refills: 0 | Status: COMPLETED | OUTPATIENT
Start: 2024-04-07 | End: 2024-04-08

## 2024-04-07 RX ADMIN — LACOSAMIDE 150 MILLIGRAM(S): 50 TABLET ORAL at 17:33

## 2024-04-07 RX ADMIN — Medication 650 MILLIGRAM(S): at 13:43

## 2024-04-07 RX ADMIN — LACOSAMIDE 150 MILLIGRAM(S): 50 TABLET ORAL at 05:32

## 2024-04-07 RX ADMIN — Medication 650 MILLIGRAM(S): at 22:00

## 2024-04-07 RX ADMIN — GABAPENTIN 300 MILLIGRAM(S): 400 CAPSULE ORAL at 05:32

## 2024-04-07 RX ADMIN — Medication 650 MILLIGRAM(S): at 21:03

## 2024-04-07 RX ADMIN — Medication 1 TABLET(S): at 12:51

## 2024-04-07 RX ADMIN — GABAPENTIN 300 MILLIGRAM(S): 400 CAPSULE ORAL at 13:43

## 2024-04-07 RX ADMIN — Medication 650 MILLIGRAM(S): at 09:27

## 2024-04-07 RX ADMIN — SENNA PLUS 2 TABLET(S): 8.6 TABLET ORAL at 21:03

## 2024-04-07 RX ADMIN — TAMSULOSIN HYDROCHLORIDE 0.4 MILLIGRAM(S): 0.4 CAPSULE ORAL at 21:03

## 2024-04-07 RX ADMIN — ATORVASTATIN CALCIUM 20 MILLIGRAM(S): 80 TABLET, FILM COATED ORAL at 21:03

## 2024-04-07 RX ADMIN — Medication 650 MILLIGRAM(S): at 08:27

## 2024-04-07 RX ADMIN — GABAPENTIN 300 MILLIGRAM(S): 400 CAPSULE ORAL at 21:03

## 2024-04-07 NOTE — PROGRESS NOTE ADULT - SUBJECTIVE AND OBJECTIVE BOX
Patient is a 53y old  Male who presents with a chief complaint of Hematoma (05 Apr 2024 09:01)    Patient is seen and examined at the bedside.   Appears comfortable and no new complaints.     Allergies    No Known Allergies    Intolerances        MEDICATIONS  (STANDING):  atorvastatin 20 milliGRAM(s) Oral at bedtime  gabapentin 300 milliGRAM(s) Oral every 8 hours  lacosamide 150 milliGRAM(s) Oral two times a day  multivitamin 1 Tablet(s) Oral daily  polyethylene glycol 3350 17 Gram(s) Oral daily  senna 2 Tablet(s) Oral at bedtime  tamsulosin 0.4 milliGRAM(s) Oral at bedtime    MEDICATIONS  (PRN):  HYDROmorphone  Injectable 0.5 milliGRAM(s) IV Push every 4 hours PRN Severe Pain (7 - 10)  HYDROmorphone  Injectable 0.25 milliGRAM(s) IV Push four times a day PRN Moderate Pain (4 - 6)    Vital Signs Last 24 Hrs  T(C): 36.7 (07 Apr 2024 08:00), Max: 36.9 (07 Apr 2024 00:00)  T(F): 98 (07 Apr 2024 08:00), Max: 98.4 (07 Apr 2024 00:00)  HR: 73 (07 Apr 2024 11:00) (69 - 89)  BP: 97/55 (07 Apr 2024 11:00) (96/56 - 128/72)  BP(mean): 73 (07 Apr 2024 11:00) (69 - 91)  RR: 14 (07 Apr 2024 11:00) (14 - 24)  SpO2: 96% (07 Apr 2024 11:00) (96% - 100%)    Parameters below as of 07 Apr 2024 08:00  Patient On (Oxygen Delivery Method): room air      PE  NAD  Awake, alert  Anicteric, MMM  RRR  CTAB  Abd soft, NT, ND  No c/c                                            7.5    10.24 )-----------( 159      ( 07 Apr 2024 04:41 )             22.5   04-07    139  |  104  |  20  ----------------------------<  104<H>  4.4   |  26  |  0.98    Ca    7.6<L>      07 Apr 2024 00:20  Phos  2.8     04-07  Mg     2.2     04-07    TPro  5.8<L>  /  Alb  3.2<L>  /  TBili  0.3  /  DBili  x   /  AST  14  /  ALT  12  /  AlkPhos  56  04-07

## 2024-04-07 NOTE — PROGRESS NOTE ADULT - ASSESSMENT
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 On admission, vitals significant for hypotension and labs showing anemia with CTA showing large hematoma of the LLE and small RLE hematoma with repeat CTA showing interval increase of the hematoma and new small LLE hematoma s/p 2units pRBCs. He is now admitted to the MICU for further management.     NEURO:  Baseline: AOx3     #Seizure disorder  # CVA with residual expressive aphasia    PLAN  - cw home AEDs (lacosamide and vyanese); vyvanse not on formulary, encouraged patient to bring from home  - cw home gabapentin     CARDIOVASCULAR:  -Keep Mg>2, K>4, P>3    #HLD  -cw home atorvastatin      HEMODYNAMICS:  - patient hypotensive in ED s/p pRBCx2 and off pressors  - noted to have soft BP (4/5) ON that responded to blood transfusion       RESPIRATORY:  NEFTALI    GI/NUTRITION:  NEFTALI    Diet:   -Regular    RENAL/:  #BPH  -cw flomax       ID:   #leukocytosis  - noted to have elevated WBC  - likely inflammatory iso blood loss with low concern for infection  - monitor off antibiotics   - trend WBC and fever curve    HEME:  #Anemia  - patient anemic (Hb baseline 13-14) 2/2 hematoma  - follow up IR and vascular recs  - Maintain active type and screen  - transfuse for Hb>7  - monitor cbc q6    #DVTs  - hx of bilateral DVTs (noted to resolve 1/24)  - hold lovenox and AC iso bleed  - consider IVC filter evaluation once clinical picture improves     MSK  - patient's LLE swollen with concern for compartment syndrome  - f/u ortho and vascular recs for management vs possible fasciotomy; low concern of compartment syndrome   - s/p hematoma evacuation by vascular 4/5  - pain control with dilaudid 0.25, 0.5mg       ENDOCRINE:  NEFTALI    DVT PPX:  - SCDs    ETHICS:  full code

## 2024-04-07 NOTE — OCCUPATIONAL THERAPY INITIAL EVALUATION ADULT - SITTING BALANCE: STATIC
Pt recently evaluated in the ED for nausea/emesis during pregnancy. She was unsure how to use the rectal phenergan. This was explained to her. She will try this and call back with any questions/concerns or worsening symptoms.     Reason for Disposition   Health Information question, no triage required and triager able to answer question    Protocols used: INFORMATION ONLY CALL - NO TRIAGE-A-      
good minus

## 2024-04-07 NOTE — PROGRESS NOTE ADULT - ASSESSMENT
53y Male with pmhx CVA abd Hx of DVT's    Anemia  - patient anemic (Hb baseline 13-14) 2/2 hematoma; S/P PRBC 2U in ED.    - follow up IR and vascular recs  - transfuse for Hb>7  - monitor cbc    DVTs  - hx of bilateral DVTs (noted to resolve 1/24)  - hold lovenox and AC for now due to large hematoma  - pt last seen Dr Varela on 12/2023  - monitor for now    MSK  - patient's LLE swollen with concern for compartment syndrome  - Interval increase in size ( 61-->71cm) overnight  - f/u ortho and vascular recs for management for possible fasciotomy   - pain control per primary team  - as per Vascular s/p evacuation of hematoma in OR 4/5.   - overall improved condition.    will follow

## 2024-04-07 NOTE — OCCUPATIONAL THERAPY INITIAL EVALUATION ADULT - ADL RETRAINING, OT EVAL
Pt will perform toileting with independence within 4 weeks. Pt will perform LB dressing with independence within 4 weeks.

## 2024-04-07 NOTE — PROGRESS NOTE ADULT - ASSESSMENT
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 On admission, vitals significant for hypotension and labs showing anemia with CTA showing large hematoma of the LLE and small RLE hematoma with repeat CTA showing interval increase of the hematoma and new small LLE hematoma s/p 2units pRBCs. He is now admitted to the MICU for further management.       # LE Hematoma, Anemia   - AC on hold   - S/P PRBCs x2  - S/P OR with vascular surgery for hematoma evacuation on 4/5  - Pulse checks per protocol  - Monitor LE closely  - Serial CBC, Maintain active T/S, transfuse for Hgb < 7.0  - IR, Vascular, MICU care appreciated   - Per vascular surgery     #leukocytosis  - noted to have elevated WBC  - likely inflammatory iso blood loss with low concern for infection  - trend WBC and fever curve  - If febrile, check pan cultures    #Seizure disorder  # CVA with residual expressive aphasia  - C/w home AEDs (lacosamide and vyanese   - C/w home gabapentin   - Seizure precautions  - Neuro eval appreciated; F/u recs     #HLD  - C/w home Lipitor    #BPH  - C/w flomax    #DVTs  - hx of bilateral DVTs (noted to resolve 1/24)  - hold lovenox and AC iso bleed      Care per MICU

## 2024-04-07 NOTE — PROGRESS NOTE ADULT - SUBJECTIVE AND OBJECTIVE BOX
TEAM [ Vascular ] Surgery Daily Progress Note  =====================================================    SUBJECTIVE: Patient seen and examined at bedside on AM rounds. Patient reports that they're feeling okay. Having stable intermittent pain in his L thigh. Tolerating diet, denies nausea, vomiting. States that he's having some LLE altered sensation but upon further questioning it's residual from CVA and has been this way for a very long time before hematoma. Denies fever, chills.    PMH:   PAST MEDICAL & SURGICAL HISTORY:  History of TIAs      CVA (cerebrovascular accident)      HLD (hyperlipidemia)      Vertigo      Colon polyp      Seizure disorder      History of fasciotomy      H/O arthroscopy of knee      S/P excision of neuroma      History of loop recorder    ALLERGIES:  No Known Allergies  --------------------------------------------------------------------------------------  MEDICATIONS:    Neurologic Medications  acetaminophen     Tablet .. 650 milliGRAM(s) Oral every 6 hours  gabapentin 300 milliGRAM(s) Oral every 8 hours  HYDROmorphone  Injectable 0.5 milliGRAM(s) IV Push every 4 hours PRN Severe Pain (7 - 10)  HYDROmorphone  Injectable 0.25 milliGRAM(s) IV Push four times a day PRN Moderate Pain (4 - 6)  lacosamide 150 milliGRAM(s) Oral two times a day    Respiratory Medications    Cardiovascular Medications    Gastrointestinal Medications  multivitamin 1 Tablet(s) Oral daily  polyethylene glycol 3350 17 Gram(s) Oral daily  senna 2 Tablet(s) Oral at bedtime    Genitourinary Medications  tamsulosin 0.4 milliGRAM(s) Oral at bedtime    Hematologic/Oncologic Medications    Antimicrobial/Immunologic Medications    Endocrine/Metabolic Medications  atorvastatin 20 milliGRAM(s) Oral at bedtime    Topical/Other Medications  --------------------------------------------------------------------------------------  VITAL SIGNS:    T(C): 36.7 (04-07-24 @ 08:00), Max: 36.9 (04-07-24 @ 00:00)  HR: 76 (04-07-24 @ 12:00) (69 - 89)  BP: 106/62 (04-07-24 @ 12:00) (96/56 - 128/72)  RR: 15 (04-07-24 @ 12:00) (14 - 24)  SpO2: 98% (04-07-24 @ 12:00) (96% - 100%)  --------------------------------------------------------------------------------------  Exam:  General: Not acutely distressed  Neuro: AO x4  Respiratory: nonlabored respirations  CV: pulse present   Extremities: warm. LLE medial thigh with superficially denuded skin surrounding incision covered by adaptic, pictures taken incision c/d/i with some sutures in place, no drainage. Bruising over posterior thigh marked with pen, not violating lines. Re-dressed on rounds. L DP and PT pulses strong, stable sensory status per his baseline, motor intact and stable. LLE warm.  --------------------------------------------------------------------------------------  LABS                        7.5    10.24 )-----------( 159      ( 07 Apr 2024 04:41 )             22.5   04-07    139  |  104  |  20  ----------------------------<  104<H>  4.4   |  26  |  0.98    Ca    7.6<L>      07 Apr 2024 00:20  Phos  2.8     04-07  Mg     2.2     04-07    TPro  5.8<L>  /  Alb  3.2<L>  /  TBili  0.3  /  DBili  x   /  AST  14  /  ALT  12  /  AlkPhos  56  04-07  --------------------------------------------------------------------------------------    INS AND OUTS:    04-06-24 @ 07:01  -  04-07-24 @ 07:00  --------------------------------------------------------  IN: 500 mL / OUT: 2410 mL / NET: -1910 mL    04-07-24 @ 07:01  -  04-07-24 @ 12:36  --------------------------------------------------------  IN: 0 mL / OUT: 500 mL / NET: -500 mL        --------------------------------------------------------------------------------------

## 2024-04-07 NOTE — PROGRESS NOTE ADULT - ATTENDING COMMENTS
52 y/o M w/b/l DVTs on AC, prior CVA w/residual aphasia, seizure disorder, admitted with spontaneous L thigh hematoma. CTA w/large hematoma w/continued extravasation. S/p evacuation with removal of 1L clot 4/5    - hgb stable, continue to trend CBC  - will transition to q4 neurovascular checks, okay per surgery  - pain control  - holding therapeutic AC, no evidence of dvt on duplex 4/6, if unable to reinitiate AC in reasonable time may consider IVCF  - Continue home AED  - on vyvanse at home  - downgrade to floors

## 2024-04-07 NOTE — OCCUPATIONAL THERAPY INITIAL EVALUATION ADULT - PERTINENT HX OF CURRENT PROBLEM, REHAB EVAL
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 Otherwise, there was no trauma to the area nor falls. The pain is described as throbbing and does not radiate. He did not try any supportive measures and came to the hospital directly. Of note, the pt was on eliquis when he was found to have bilateral DVTs, however this was changed to lovenox when he had a CVA while on eliquis. Hospital course: On admission, the pt's vitals were noted to be within normal limits, however he then became hypotensive for which he received 2u pRBCs and protamine sulfate. His labs were significant for anemia and leukocytosis and a CTA showed large hematoma in the LLE and small hematoma of the RLE. He is now admitted to the MICU for further management. CTA LLE: Enlarging left anteromedial upper thigh subcutaneous hematoma with increased mass effect on the medial compartment musculature. No evidence of arterial contrast extravasation on this exam. pt hypotensive in ED s/p pRBCx2 and off pressors, noted to have soft BP (4/5) ON that responded to blood transfusion, s/p hematoma evacuation by vascular 4/5. Per ortho, Left thigh hematoma suspected to be due to spontaneous bleeding from Lovenox injections; No current clinical concern for acute compartment syndrome in the thigh. Pt s/p LLE hematoma evacuation on 4/6.

## 2024-04-07 NOTE — CHART NOTE - NSCHARTNOTEFT_GEN_A_CORE
Called by MICU around 9 pm to report that patient had increased ecchymosis on underside of his left thigh. Patient seen and examined at bedside. Palpable DP and PT pulses. Motor and sensory intact. Area of ecchymosis was soft and outlined with marker. Dressing over incision was c/d/i. Case discussed with vascular surgery fellow on call. Plan to continue to monitor exam.     Called by MICU around 1 am to report that Hgb downtrended from 7.5 to 7.4 to 7.1 to 6.9. One unit pRBCs ordered. Patient again seen and examined at bedside. Exam stable. No increased size of area of ecchymosis. Site remained soft. Recommended that MICU check a post-transfusion CBC to rule out active bleeding, and then decrease blood draws to q12.     Vascular Surgery will continue to monitor patient's exam. Called by MICU around 9 pm to report that patient had increased ecchymosis on underside of his left thigh. Patient seen and examined at bedside. Palpable DP and PT pulses. Motor and sensory intact. Area of ecchymosis was soft and outlined with marker. Dressing over incision was c/d/i. Case discussed with vascular surgery fellow on call. Plan to continue to monitor exam.     Called by MICU around 1 am to report that Hgb downtrended from 7.5 to 7.4 to 7.1 to 6.9. One unit pRBCs ordered. Patient again seen and examined at bedside. Exam stable. No increased size of area of ecchymosis. Site remained soft. Case discussed with senior resident on call. Recommended that MICU check a post-transfusion CBC to rule out active bleeding, and then decrease blood draws to q12.     Vascular Surgery will continue to monitor patient's exam.

## 2024-04-07 NOTE — PROGRESS NOTE ADULT - SUBJECTIVE AND OBJECTIVE BOX
Name of Patient : TAMI DUNHAM  MRN: [id number]  Date of visit: 04-07-24       Subjective: Patient seen and examined. No new events except as noted.     REVIEW OF SYSTEMS:    CONSTITUTIONAL: No weakness, fevers or chills  EYES/ENT: No visual changes;  No vertigo or throat pain   NECK: No pain or stiffness  RESPIRATORY: No cough, wheezing, hemoptysis; No shortness of breath  CARDIOVASCULAR: No chest pain or palpitations  GASTROINTESTINAL: No abdominal or epigastric pain. No nausea, vomiting, or hematemesis; No diarrhea or constipation. No melena or hematochezia.  GENITOURINARY: No dysuria, frequency or hematuria  NEUROLOGICAL: No numbness or weakness  SKIN: No itching, burning, rashes, or lesions   All other review of systems is negative unless indicated above.    MEDICATIONS:  MEDICATIONS  (STANDING):  acetaminophen     Tablet .. 650 milliGRAM(s) Oral every 6 hours  atorvastatin 20 milliGRAM(s) Oral at bedtime  gabapentin 300 milliGRAM(s) Oral every 8 hours  lacosamide 150 milliGRAM(s) Oral two times a day  multivitamin 1 Tablet(s) Oral daily  polyethylene glycol 3350 17 Gram(s) Oral daily  senna 2 Tablet(s) Oral at bedtime  tamsulosin 0.4 milliGRAM(s) Oral at bedtime      PHYSICAL EXAM:  T(C): 36.8 (04-07-24 @ 18:05), Max: 37.1 (04-07-24 @ 16:00)  HR: 85 (04-07-24 @ 18:05) (69 - 85)  BP: 100/63 (04-07-24 @ 18:05) (95/62 - 117/70)  RR: 20 (04-07-24 @ 18:05) (14 - 23)  SpO2: 98% (04-07-24 @ 18:05) (95% - 100%)  Wt(kg): --  I&O's Summary    06 Apr 2024 07:01  -  07 Apr 2024 07:00  --------------------------------------------------------  IN: 500 mL / OUT: 2410 mL / NET: -1910 mL    07 Apr 2024 07:01  -  07 Apr 2024 23:17  --------------------------------------------------------  IN: 0 mL / OUT: 1080 mL / NET: -1080 mL          Appearance: Normal	  HEENT:  PERRLA   Lymphatic: No lymphadenopathy   Cardiovascular: Normal S1 S2, no JVD  Respiratory: normal effort , clear  Gastrointestinal:  Soft, Non-tender  Skin: No rashes,  warm to touch  Psychiatry:  Mood & affect appropriate  Musculuskeletal: No edema    recent labs, Imaging and EKGs personally reviewed     04-06-24 @ 07:01  -  04-07-24 @ 07:00  --------------------------------------------------------  IN: 500 mL / OUT: 2410 mL / NET: -1910 mL    04-07-24 @ 07:01  -  04-07-24 @ 23:17  --------------------------------------------------------  IN: 0 mL / OUT: 1080 mL / NET: -1080 mL      CBC:            7.3    10.03 )-----------( 164      ( 04-07-24 @ 19:54 )             21.7         Chem:         ( 04-07-24 @ 00:20 )    139  |  104  |  20  ----------------------------<  104<H>  4.4   |  26  |  0.98        Liver Functions: ( 04-07-24 @ 00:20 )  Alb: 3.2 g/dL / Pro: 5.8 g/dL / ALK PHOS: 56 U/L / ALT: 12 U/L / AST: 14 U/L / GGT: x              Type & Screen: ( 04-07-24 @ 00:22 )    ABO/Rh/Arely:  O Negative

## 2024-04-07 NOTE — PROGRESS NOTE ADULT - SUBJECTIVE AND OBJECTIVE BOX
INTERVAL HPI/OVERNIGHT EVENTS: pt oozing from ASHLEY site, Hgb down to 6.9, given 1 unit pPRBC, improved to 7.5. Arana catheter d/c'ed.     SUBJECTIVE: Patient seen and examined at bedside.       VITAL SIGNS:  ICU Vital Signs Last 24 Hrs  T(C): 36.9 (07 Apr 2024 04:00), Max: 36.9 (07 Apr 2024 00:00)  T(F): 98.4 (07 Apr 2024 04:00), Max: 98.4 (07 Apr 2024 00:00)  HR: 69 (07 Apr 2024 07:00) (69 - 91)  BP: 96/56 (07 Apr 2024 07:00) (96/56 - 128/72)  BP(mean): 69 (07 Apr 2024 07:00) (69 - 91)  ABP: --  ABP(mean): --  RR: 15 (07 Apr 2024 07:00) (14 - 24)  SpO2: 96% (07 Apr 2024 07:00) (96% - 100%)    O2 Parameters below as of 07 Apr 2024 04:00  Patient On (Oxygen Delivery Method): room air            Plateau pressure:   P/F ratio:     04-06 @ 07:01  -  04-07 @ 07:00  --------------------------------------------------------  IN: 500 mL / OUT: 2410 mL / NET: -1910 mL      CAPILLARY BLOOD GLUCOSE        ECG:    PHYSICAL EXAM:    General: intubated, sedated  HEENT: atraumatic, normocephalic. EOMI  Neck: supple, no JVD  Respiratory: CTAB, no increased work of breathing  Cardiovascular: RRR  Abdomen: soft, NT, ND  Extremities: 2+ peripheral pulses b/l  Neurological: sedated    MEDICATIONS:  MEDICATIONS  (STANDING):  atorvastatin 20 milliGRAM(s) Oral at bedtime  gabapentin 300 milliGRAM(s) Oral every 8 hours  lacosamide 150 milliGRAM(s) Oral two times a day  multivitamin 1 Tablet(s) Oral daily  polyethylene glycol 3350 17 Gram(s) Oral daily  senna 2 Tablet(s) Oral at bedtime  tamsulosin 0.4 milliGRAM(s) Oral at bedtime    MEDICATIONS  (PRN):  HYDROmorphone  Injectable 0.25 milliGRAM(s) IV Push four times a day PRN Moderate Pain (4 - 6)  HYDROmorphone  Injectable 0.5 milliGRAM(s) IV Push every 4 hours PRN Severe Pain (7 - 10)      ALLERGIES:  Allergies    No Known Allergies    Intolerances        LABS:                        7.5    10.24 )-----------( 159      ( 07 Apr 2024 04:41 )             22.5     04-07    139  |  104  |  20  ----------------------------<  104<H>  4.4   |  26  |  0.98    Ca    7.6<L>      07 Apr 2024 00:20  Phos  2.8     04-07  Mg     2.2     04-07    TPro  5.8<L>  /  Alb  3.2<L>  /  TBili  0.3  /  DBili  x   /  AST  14  /  ALT  12  /  AlkPhos  56  04-07    PT/INR - ( 07 Apr 2024 00:19 )   PT: 9.7 sec;   INR: 0.92 ratio         PTT - ( 07 Apr 2024 00:19 )  PTT:24.7 sec  Urinalysis Basic - ( 07 Apr 2024 00:20 )    Color: x / Appearance: x / SG: x / pH: x  Gluc: 104 mg/dL / Ketone: x  / Bili: x / Urobili: x   Blood: x / Protein: x / Nitrite: x   Leuk Esterase: x / RBC: x / WBC x   Sq Epi: x / Non Sq Epi: x / Bacteria: x        RADIOLOGY & ADDITIONAL TESTS: Reviewed. INTERVAL HPI/OVERNIGHT EVENTS: pt oozing from ASHLEY site, Hgb down to 6.9, given 1 unit pPRBC, improved to 7.5. Arana catheter d/c'ed.     SUBJECTIVE: Patient seen and examined at bedside. endorsing sore throat onset this AM, otherwise, does c.o. "stinging sensation" at site of prior fasciectomy at L. lower leg. This is chronic, has followed with Neurologist for this complaint in past.       VITAL SIGNS:  ICU Vital Signs Last 24 Hrs  T(C): 36.9 (07 Apr 2024 04:00), Max: 36.9 (07 Apr 2024 00:00)  T(F): 98.4 (07 Apr 2024 04:00), Max: 98.4 (07 Apr 2024 00:00)  HR: 69 (07 Apr 2024 07:00) (69 - 91)  BP: 96/56 (07 Apr 2024 07:00) (96/56 - 128/72)  BP(mean): 69 (07 Apr 2024 07:00) (69 - 91)  ABP: --  ABP(mean): --  RR: 15 (07 Apr 2024 07:00) (14 - 24)  SpO2: 96% (07 Apr 2024 07:00) (96% - 100%)    O2 Parameters below as of 07 Apr 2024 04:00  Patient On (Oxygen Delivery Method): room air            Plateau pressure:   P/F ratio:     04-06 @ 07:01  -  04-07 @ 07:00  --------------------------------------------------------  IN: 500 mL / OUT: 2410 mL / NET: -1910 mL      CAPILLARY BLOOD GLUCOSE        ECG:    PHYSICAL EXAM:    General: Laying in bed comfortably on RA.  HEENT: normocephalic. EOMI  Neck: supple, no JVD  Respiratory: CTAB, no increased work of breathing  Cardiovascular: RRR  Abdomen: soft, NT, ND  Extremities: LLE ace bandage. feet warm to the touch.   Neurological: A&Ox3    MEDICATIONS:  MEDICATIONS  (STANDING):  atorvastatin 20 milliGRAM(s) Oral at bedtime  gabapentin 300 milliGRAM(s) Oral every 8 hours  lacosamide 150 milliGRAM(s) Oral two times a day  multivitamin 1 Tablet(s) Oral daily  polyethylene glycol 3350 17 Gram(s) Oral daily  senna 2 Tablet(s) Oral at bedtime  tamsulosin 0.4 milliGRAM(s) Oral at bedtime    MEDICATIONS  (PRN):  HYDROmorphone  Injectable 0.25 milliGRAM(s) IV Push four times a day PRN Moderate Pain (4 - 6)  HYDROmorphone  Injectable 0.5 milliGRAM(s) IV Push every 4 hours PRN Severe Pain (7 - 10)      ALLERGIES:  Allergies    No Known Allergies    Intolerances        LABS:                        7.5    10.24 )-----------( 159      ( 07 Apr 2024 04:41 )             22.5     04-07    139  |  104  |  20  ----------------------------<  104<H>  4.4   |  26  |  0.98    Ca    7.6<L>      07 Apr 2024 00:20  Phos  2.8     04-07  Mg     2.2     04-07    TPro  5.8<L>  /  Alb  3.2<L>  /  TBili  0.3  /  DBili  x   /  AST  14  /  ALT  12  /  AlkPhos  56  04-07    PT/INR - ( 07 Apr 2024 00:19 )   PT: 9.7 sec;   INR: 0.92 ratio         PTT - ( 07 Apr 2024 00:19 )  PTT:24.7 sec  Urinalysis Basic - ( 07 Apr 2024 00:20 )    Color: x / Appearance: x / SG: x / pH: x  Gluc: 104 mg/dL / Ketone: x  / Bili: x / Urobili: x   Blood: x / Protein: x / Nitrite: x   Leuk Esterase: x / RBC: x / WBC x   Sq Epi: x / Non Sq Epi: x / Bacteria: x        RADIOLOGY & ADDITIONAL TESTS: Reviewed. INTERVAL HPI/OVERNIGHT EVENTS: pt oozing from ASHLEY site, Hgb down to 6.9, given 1 unit pPRBC, improved to 7.5. Arana catheter d/c'ed.     SUBJECTIVE: Patient seen and examined at bedside. endorsing sore throat onset this AM, otherwise, does c.o. "stinging sensation" at site of prior fasciectomy at L. lower leg. This is chronic, has followed with Neurologist for this complaint in past.       VITAL SIGNS:  ICU Vital Signs Last 24 Hrs  T(C): 36.9 (07 Apr 2024 04:00), Max: 36.9 (07 Apr 2024 00:00)  T(F): 98.4 (07 Apr 2024 04:00), Max: 98.4 (07 Apr 2024 00:00)  HR: 69 (07 Apr 2024 07:00) (69 - 91)  BP: 96/56 (07 Apr 2024 07:00) (96/56 - 128/72)  BP(mean): 69 (07 Apr 2024 07:00) (69 - 91)  ABP: --  ABP(mean): --  RR: 15 (07 Apr 2024 07:00) (14 - 24)  SpO2: 96% (07 Apr 2024 07:00) (96% - 100%)    O2 Parameters below as of 07 Apr 2024 04:00  Patient On (Oxygen Delivery Method): room air            Plateau pressure:   P/F ratio:     04-06 @ 07:01  -  04-07 @ 07:00  --------------------------------------------------------  IN: 500 mL / OUT: 2410 mL / NET: -1910 mL      CAPILLARY BLOOD GLUCOSE        ECG:    PHYSICAL EXAM:    General: Laying in bed comfortably on RA.  HEENT: normocephalic. EOMI  Neck: supple, no JVD  Respiratory: CTAB, no increased work of breathing  Cardiovascular: RRR  Abdomen: soft, NT, ND  Extremities: LLE ace bandage. feet warm to the touch. ASHLEY drain x2 in place. re-dressed this AM. draining blood.  Neurological: A&Ox3    MEDICATIONS:  MEDICATIONS  (STANDING):  atorvastatin 20 milliGRAM(s) Oral at bedtime  gabapentin 300 milliGRAM(s) Oral every 8 hours  lacosamide 150 milliGRAM(s) Oral two times a day  multivitamin 1 Tablet(s) Oral daily  polyethylene glycol 3350 17 Gram(s) Oral daily  senna 2 Tablet(s) Oral at bedtime  tamsulosin 0.4 milliGRAM(s) Oral at bedtime    MEDICATIONS  (PRN):  HYDROmorphone  Injectable 0.25 milliGRAM(s) IV Push four times a day PRN Moderate Pain (4 - 6)  HYDROmorphone  Injectable 0.5 milliGRAM(s) IV Push every 4 hours PRN Severe Pain (7 - 10)      ALLERGIES:  Allergies    No Known Allergies    Intolerances        LABS:                        7.5    10.24 )-----------( 159      ( 07 Apr 2024 04:41 )             22.5     04-07    139  |  104  |  20  ----------------------------<  104<H>  4.4   |  26  |  0.98    Ca    7.6<L>      07 Apr 2024 00:20  Phos  2.8     04-07  Mg     2.2     04-07    TPro  5.8<L>  /  Alb  3.2<L>  /  TBili  0.3  /  DBili  x   /  AST  14  /  ALT  12  /  AlkPhos  56  04-07    PT/INR - ( 07 Apr 2024 00:19 )   PT: 9.7 sec;   INR: 0.92 ratio         PTT - ( 07 Apr 2024 00:19 )  PTT:24.7 sec  Urinalysis Basic - ( 07 Apr 2024 00:20 )    Color: x / Appearance: x / SG: x / pH: x  Gluc: 104 mg/dL / Ketone: x  / Bili: x / Urobili: x   Blood: x / Protein: x / Nitrite: x   Leuk Esterase: x / RBC: x / WBC x   Sq Epi: x / Non Sq Epi: x / Bacteria: x        RADIOLOGY & ADDITIONAL TESTS: Reviewed.

## 2024-04-07 NOTE — PROGRESS NOTE ADULT - ASSESSMENT
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 Smith presents with lower extremity pain. He was found to have a hematoma on the medial left thigh. The patient is hemodynamically stable with Hgb 12 g/dL and intact motor and sensory functions in the lower extremity. CTA demonstrating active subcutaneous and gracilis hematomas, large in size compared to CT performed four hours prior. S/p OR on 4/5 for LLE hematoma evacuation.    Recommendations:  - Daily dressing changes by vascular  - Please order and have bacitracin available at bedside for daily dressing changes  - Hold AC, continue to monitor H/H q12   - If concern for H/H drop recommend repeat CTA to eval for acute extrav or if unstable HD recommend considering re-consult of IR and CTA  - Recommend heme workup for spontaneous hematoma formation while on AC  - Care per primary team    Discussed with vascular surgery fellow on behalf of Dr. Christie.    Vascular Surgery  [id number]

## 2024-04-07 NOTE — CHART NOTE - NSCHARTNOTEFT_GEN_A_CORE
Vascular service called due to down trending hg. Left thigh ecchymosis remains the same no hematoma formation noted. 1 unit of PRBC ordered for hg of 6.9.   Patient remains hemodynamically stable.

## 2024-04-08 LAB
ALBUMIN SERPL ELPH-MCNC: 2.9 G/DL — LOW (ref 3.3–5)
ALP SERPL-CCNC: 56 U/L — SIGNIFICANT CHANGE UP (ref 40–120)
ALT FLD-CCNC: 15 U/L — SIGNIFICANT CHANGE UP (ref 10–45)
ANION GAP SERPL CALC-SCNC: 9 MMOL/L — SIGNIFICANT CHANGE UP (ref 5–17)
APTT BLD: 26.3 SEC — SIGNIFICANT CHANGE UP (ref 24.5–35.6)
AST SERPL-CCNC: 12 U/L — SIGNIFICANT CHANGE UP (ref 10–40)
BILIRUB SERPL-MCNC: 0.4 MG/DL — SIGNIFICANT CHANGE UP (ref 0.2–1.2)
BUN SERPL-MCNC: 18 MG/DL — SIGNIFICANT CHANGE UP (ref 7–23)
CALCIUM SERPL-MCNC: 7.5 MG/DL — LOW (ref 8.4–10.5)
CHLORIDE SERPL-SCNC: 108 MMOL/L — SIGNIFICANT CHANGE UP (ref 96–108)
CO2 SERPL-SCNC: 22 MMOL/L — SIGNIFICANT CHANGE UP (ref 22–31)
CREAT SERPL-MCNC: 0.78 MG/DL — SIGNIFICANT CHANGE UP (ref 0.5–1.3)
EGFR: 107 ML/MIN/1.73M2 — SIGNIFICANT CHANGE UP
GLUCOSE SERPL-MCNC: 87 MG/DL — SIGNIFICANT CHANGE UP (ref 70–99)
HCT VFR BLD CALC: 22.1 % — LOW (ref 39–50)
HGB BLD-MCNC: 7.3 G/DL — LOW (ref 13–17)
INR BLD: 0.89 RATIO — SIGNIFICANT CHANGE UP (ref 0.85–1.18)
MAGNESIUM SERPL-MCNC: 2.2 MG/DL — SIGNIFICANT CHANGE UP (ref 1.6–2.6)
MCHC RBC-ENTMCNC: 30.2 PG — SIGNIFICANT CHANGE UP (ref 27–34)
MCHC RBC-ENTMCNC: 33 GM/DL — SIGNIFICANT CHANGE UP (ref 32–36)
MCV RBC AUTO: 91.3 FL — SIGNIFICANT CHANGE UP (ref 80–100)
NRBC # BLD: 0 /100 WBCS — SIGNIFICANT CHANGE UP (ref 0–0)
PHOSPHATE SERPL-MCNC: 3.3 MG/DL — SIGNIFICANT CHANGE UP (ref 2.5–4.5)
PLATELET # BLD AUTO: 175 K/UL — SIGNIFICANT CHANGE UP (ref 150–400)
POTASSIUM SERPL-MCNC: 4.3 MMOL/L — SIGNIFICANT CHANGE UP (ref 3.5–5.3)
POTASSIUM SERPL-SCNC: 4.3 MMOL/L — SIGNIFICANT CHANGE UP (ref 3.5–5.3)
PROT SERPL-MCNC: 5.6 G/DL — LOW (ref 6–8.3)
PROTHROM AB SERPL-ACNC: 9.4 SEC — LOW (ref 9.5–13)
RBC # BLD: 2.42 M/UL — LOW (ref 4.2–5.8)
RBC # FLD: 14.1 % — SIGNIFICANT CHANGE UP (ref 10.3–14.5)
SODIUM SERPL-SCNC: 139 MMOL/L — SIGNIFICANT CHANGE UP (ref 135–145)
WBC # BLD: 9.45 K/UL — SIGNIFICANT CHANGE UP (ref 3.8–10.5)
WBC # FLD AUTO: 9.45 K/UL — SIGNIFICANT CHANGE UP (ref 3.8–10.5)

## 2024-04-08 RX ADMIN — POLYETHYLENE GLYCOL 3350 17 GRAM(S): 17 POWDER, FOR SOLUTION ORAL at 11:16

## 2024-04-08 RX ADMIN — SENNA PLUS 2 TABLET(S): 8.6 TABLET ORAL at 21:41

## 2024-04-08 RX ADMIN — LACOSAMIDE 150 MILLIGRAM(S): 50 TABLET ORAL at 11:52

## 2024-04-08 RX ADMIN — Medication 650 MILLIGRAM(S): at 01:45

## 2024-04-08 RX ADMIN — GABAPENTIN 300 MILLIGRAM(S): 400 CAPSULE ORAL at 11:16

## 2024-04-08 RX ADMIN — GABAPENTIN 300 MILLIGRAM(S): 400 CAPSULE ORAL at 21:38

## 2024-04-08 RX ADMIN — LACOSAMIDE 150 MILLIGRAM(S): 50 TABLET ORAL at 04:59

## 2024-04-08 RX ADMIN — ATORVASTATIN CALCIUM 20 MILLIGRAM(S): 80 TABLET, FILM COATED ORAL at 21:38

## 2024-04-08 RX ADMIN — GABAPENTIN 300 MILLIGRAM(S): 400 CAPSULE ORAL at 04:59

## 2024-04-08 RX ADMIN — TAMSULOSIN HYDROCHLORIDE 0.4 MILLIGRAM(S): 0.4 CAPSULE ORAL at 21:39

## 2024-04-08 RX ADMIN — Medication 1 TABLET(S): at 11:16

## 2024-04-08 RX ADMIN — Medication 650 MILLIGRAM(S): at 12:00

## 2024-04-08 RX ADMIN — Medication 650 MILLIGRAM(S): at 11:16

## 2024-04-08 RX ADMIN — Medication 650 MILLIGRAM(S): at 00:51

## 2024-04-08 NOTE — PROGRESS NOTE ADULT - ASSESSMENT
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 Smith presents with lower extremity pain. He was found to have a hematoma on the medial left thigh. The patient is hemodynamically stable with Hgb 12 g/dL and intact motor and sensory functions in the lower extremity. CTA demonstrating active subcutaneous and gracilis hematomas, large in size compared to CT performed four hours prior. S/p OR on 4/5 for LLE hematoma evacuation.    Recommendations:  - Daily dressing may be done by nursing staff - please apply bacitracin to incision and place adaptic over incision  - Hold AC, continue to monitor H/H q12   - If concern for H/H drop recommend repeat CTA to eval for acute extrav or if unstable HD recommend considering re-consult of IR and CTA  - appreciate care per primary team     Vascular Surgery   [phone]

## 2024-04-08 NOTE — PROGRESS NOTE ADULT - SUBJECTIVE AND OBJECTIVE BOX
Vascular Surgery Progress Note     Subjective:  Patient seen and examined.       Vital Signs:  Vital Signs Last 24 Hrs  T(C): 36.6 (08 Apr 2024 09:30), Max: 37.1 (07 Apr 2024 16:00)  T(F): 97.8 (08 Apr 2024 09:30), Max: 98.7 (07 Apr 2024 16:00)  HR: 79 (08 Apr 2024 09:30) (72 - 85)  BP: 122/77 (08 Apr 2024 09:30) (95/62 - 122/77)  BP(mean): 73 (07 Apr 2024 17:00) (73 - 89)  RR: 18 (08 Apr 2024 09:30) (15 - 23)  SpO2: 98% (08 Apr 2024 09:30) (97% - 100%)    Parameters below as of 08 Apr 2024 09:30  Patient On (Oxygen Delivery Method): room air        CAPILLARY BLOOD GLUCOSE          I&O's Detail    07 Apr 2024 07:01  -  08 Apr 2024 07:00  --------------------------------------------------------  IN:  Total IN: 0 mL    OUT:    Bulb (mL): 50 mL    Bulb (mL): 30 mL    Voided (mL): 1000 mL  Total OUT: 1080 mL    Total NET: -1080 mL      08 Apr 2024 07:01  -  08 Apr 2024 11:01  --------------------------------------------------------  IN:  Total IN: 0 mL    OUT:    Bulb (mL): 20 mL    Bulb (mL): 5 mL  Total OUT: 25 mL    Total NET: -25 mL            Physical Exam:  General: Not acutely distressed  Respiratory: nonlabored respirations  CV: pulse present   Extremities: LLE medial thigh with superficial skin surrounding incision covered by adaptic, L DP and PT pulses         Labs:    04-08    139  |  108  |  18  ----------------------------<  87  4.3   |  22  |  0.78    Ca    7.5<L>      08 Apr 2024 07:16  Phos  3.3     04-08  Mg     2.2     04-08    TPro  5.6<L>  /  Alb  2.9<L>  /  TBili  0.4  /  DBili  x   /  AST  12  /  ALT  15  /  AlkPhos  56  04-08    LIVER FUNCTIONS - ( 08 Apr 2024 07:16 )  Alb: 2.9 g/dL / Pro: 5.6 g/dL / ALK PHOS: 56 U/L / ALT: 15 U/L / AST: 12 U/L / GGT: x                                 7.3    9.45  )-----------( 175      ( 08 Apr 2024 07:16 )             22.1     PT/INR - ( 08 Apr 2024 07:16 )   PT: 9.4 sec;   INR: 0.89 ratio         PTT - ( 08 Apr 2024 07:16 )  PTT:26.3 sec

## 2024-04-08 NOTE — PROGRESS NOTE ADULT - SUBJECTIVE AND OBJECTIVE BOX
Neurology        S: patient seen. doing well s/p hematoma evacuation.  now on floor       Medications: MEDICATIONS  (STANDING):  atorvastatin 20 milliGRAM(s) Oral at bedtime  gabapentin 300 milliGRAM(s) Oral every 8 hours  lacosamide 150 milliGRAM(s) Oral two times a day  multivitamin 1 Tablet(s) Oral daily  polyethylene glycol 3350 17 Gram(s) Oral daily  senna 2 Tablet(s) Oral at bedtime  tamsulosin 0.4 milliGRAM(s) Oral at bedtime    MEDICATIONS  (PRN):  bacitracin   Ointment 1 Application(s) Topical daily PRN daily dressing change  HYDROmorphone  Injectable 0.25 milliGRAM(s) IV Push four times a day PRN Moderate Pain (4 - 6)  HYDROmorphone  Injectable 0.5 milliGRAM(s) IV Push every 4 hours PRN Severe Pain (7 - 10)       Vitals:  Vital Signs Last 24 Hrs  T(C): 36.8 (08 Apr 2024 16:30), Max: 36.9 (08 Apr 2024 01:18)  T(F): 98.3 (08 Apr 2024 16:30), Max: 98.5 (08 Apr 2024 01:18)  HR: 79 (08 Apr 2024 16:30) (79 - 85)  BP: 118/74 (08 Apr 2024 16:30) (100/63 - 122/77)  BP(mean): --  RR: 18 (08 Apr 2024 16:30) (18 - 20)  SpO2: 99% (08 Apr 2024 16:30) (97% - 99%)    Parameters below as of 08 Apr 2024 16:30  Patient On (Oxygen Delivery Method): room air           General Exam:   General Appearance: Appropriately dressed and in no acute distress       Head: Normocephalic, atraumatic and no dysmorphic features  Ear, Nose, and Throat: Moist mucous membranes  CVS: S1S2+  Resp: No SOB, no wheeze or rhonchi  GI: soft NT/ND  Extremities: No edema or cyanosis L leg thigh hematoma   Skin: No bruises or rashes     Neurological Exam:  Mental Status: Awake, alert and oriented x 3 minimal WFD .  Able to follow simple and complex verbal commands. Able to name and repeat. fluent speech. No obvious aphasia or dysarthria noted.   Cranial Nerves: PERRL, EOMI, VFFC, sensation V1-V3 intact,  no obvious facial asymmetry, equal elevation of palate, scm/trap 5/5, tongue is midline on protrusion. no obvious papilledema on fundoscopic exam. hearing is grossly intact.   Motor: Normal bulk, tone and strength throughout. Fine finger movements were intact and symmetric. no tremors or drift noted.    Sensation: Intact to light touch and pinprick throughout. no right/left confusion. no extinction to tactile on DSS.  .   Reflexes: 1+ throughout at biceps, brachioradialis, triceps, patellars and ankles bilaterally and equal. No clonus. R toe and L toe were both downgoing.  Coordination: No dysmetria on FNF    Gait: deferred     Data/Labs/Imaging which I personally reviewed.           LABS:                          7.3    9.45  )-----------( 175      ( 08 Apr 2024 07:16 )             22.1     04-08    139  |  108  |  18  ----------------------------<  87  4.3   |  22  |  0.78    Ca    7.5<L>      08 Apr 2024 07:16  Phos  3.3     04-08  Mg     2.2     04-08    TPro  5.6<L>  /  Alb  2.9<L>  /  TBili  0.4  /  DBili  x   /  AST  12  /  ALT  15  /  AlkPhos  56  04-08    LIVER FUNCTIONS - ( 08 Apr 2024 07:16 )  Alb: 2.9 g/dL / Pro: 5.6 g/dL / ALK PHOS: 56 U/L / ALT: 15 U/L / AST: 12 U/L / GGT: x           PT/INR - ( 08 Apr 2024 07:16 )   PT: 9.4 sec;   INR: 0.89 ratio         PTT - ( 08 Apr 2024 07:16 )  PTT:26.3 sec  Urinalysis Basic - ( 08 Apr 2024 07:16 )    Color: x / Appearance: x / SG: x / pH: x  Gluc: 87 mg/dL / Ketone: x  / Bili: x / Urobili: x   Blood: x / Protein: x / Nitrite: x   Leuk Esterase: x / RBC: x / WBC x   Sq Epi: x / Non Sq Epi: x / Bacteria: x

## 2024-04-08 NOTE — ADVANCED PRACTICE NURSE CONSULT - RECOMMEDATIONS
Impression   bilateral sacrum buttock intact skin no discoloration   bilateral lower extremity ecchymosis     Recommendations   1. Right and left heel  Elevate heels; apply Complete Cair air fluidized boots; ensure that the soles of the feet are not resting on the foot board of the bed.  2  Incontinent management - incontinent cleanser, pads,  momo care  BID  3. Maintain on an alternating air with low air loss surface   4. Turn & reposition every 2 hr; Use positioning pillow to turn and reposition, soft pillow between bony prominences; continue measures to decrease friction/shear/pressure.  5. Nutrition optimization.  6. Place  waffle cushion when out of bed to chair .   plan of care reviewed with covering staff   Impression   bilateral sacrum buttock intact skin no discoloration   bilateral lower extremity ecchymosis     Recommendations   1. Right and left heel  Elevate heels; apply Complete Cair air fluidized boots; ensure that the soles of the feet are not resting on the foot board of the bed.  2  Incontinent management - incontinent cleanser, pads,  momo care  BID  3. Maintain on an alternating air with low air loss surface   4. Turn & reposition every 2 hr; Use positioning pillow to turn and reposition, soft pillow between bony prominences; continue measures to decrease friction/shear/pressure.  5. Nutrition optimization.  6. Place  waffle cushion when out of bed to chair .   plan of care reviewed with covering staff Maggy Fuentes (MARK)

## 2024-04-08 NOTE — PROGRESS NOTE ADULT - SUBJECTIVE AND OBJECTIVE BOX
Patient is a 53y old  Male who presents with a chief complaint of Hematoma (08 Apr 2024 11:01)    Patient is seen and examined at the bedside.   Appears comfortable and no new complaints.       MEDICATIONS  (STANDING):  atorvastatin 20 milliGRAM(s) Oral at bedtime  gabapentin 300 milliGRAM(s) Oral every 8 hours  lacosamide 150 milliGRAM(s) Oral two times a day  multivitamin 1 Tablet(s) Oral daily  polyethylene glycol 3350 17 Gram(s) Oral daily  senna 2 Tablet(s) Oral at bedtime  tamsulosin 0.4 milliGRAM(s) Oral at bedtime    MEDICATIONS  (PRN):  bacitracin   Ointment 1 Application(s) Topical daily PRN daily dressing change  HYDROmorphone  Injectable 0.25 milliGRAM(s) IV Push four times a day PRN Moderate Pain (4 - 6)  HYDROmorphone  Injectable 0.5 milliGRAM(s) IV Push every 4 hours PRN Severe Pain (7 - 10)      Vital Signs Last 24 Hrs  T(C): 36.6 (08 Apr 2024 09:30), Max: 37.1 (07 Apr 2024 16:00)  T(F): 97.8 (08 Apr 2024 09:30), Max: 98.7 (07 Apr 2024 16:00)  HR: 79 (08 Apr 2024 09:30) (72 - 85)  BP: 122/77 (08 Apr 2024 09:30) (95/62 - 122/77)  BP(mean): 73 (07 Apr 2024 17:00) (73 - 89)  RR: 18 (08 Apr 2024 09:30) (18 - 23)  SpO2: 98% (08 Apr 2024 09:30) (97% - 100%)    Parameters below as of 08 Apr 2024 09:30  Patient On (Oxygen Delivery Method): room air        PE  NAD  Awake, alert  Anicteric, MMM  RRR  CTAB  Abd soft, NT, ND  No c/c                      7.3    9.45  )-----------( 175      ( 08 Apr 2024 07:16 )             22.1       04-08    139  |  108  |  18  ----------------------------<  87  4.3   |  22  |  0.78    Ca    7.5<L>      08 Apr 2024 07:16  Phos  3.3     04-08  Mg     2.2     04-08    TPro  5.6<L>  /  Alb  2.9<L>  /  TBili  0.4  /  DBili  x   /  AST  12  /  ALT  15  /  AlkPhos  56  04-08

## 2024-04-08 NOTE — ADVANCED PRACTICE NURSE CONSULT - REASON FOR CONSULT
Consult to assess patient skin initiated by RN for sacrum deep tissue injury, present on admission.      History of Present Illness:  Reason for Admission: Hematoma  History of Present Illness:   Patient is a 54 yo M with hx of CVA with residual expressive aphasia, 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 Otherwise, there was no trauma to the area nor falls. The pain is described as throbbing and does not radiate. He did not try any supportive measures and came to the hospital directly. Of note, the patient was on eliquis when he was found to have bilateral DVTs, however this was changed to lovenox when he had a CVA while on eliquis.    On admission, the patient's vitals were noted to be within normal limits, however he then became hypotensive for which he received 2u pRBCs and protamine sulfate. His labs were significant for anemia and leukocytosis and a CTA showed large hematoma in the LLE and small hematoma of the RLE. He is now admitted to the MICU for further management.

## 2024-04-08 NOTE — PROGRESS NOTE ADULT - ASSESSMENT
53y Male with pmhx CVA abd Hx of DVT's    Anemia  - patient anemic (Hb baseline 13-14) 2/2 hematoma; S/P PRBC 2U in ED.    - follow up IR and vascular recs  - transfuse for Hb>7  - monitor cbc Q12    DVTs  - hx of bilateral DVTs (noted to resolve 1/24)  - hold lovenox and AC for now due to large hematoma  - pt last seen Dr Varela on 12/2023  - monitor for now    MSK  - patient's LLE swollen with concern for compartment syndrome  - Interval increase in size ( 61-->71cm)   - f/u ortho and vascular recs for management for possible fasciotomy   - pain control per primary team  - s/p evacuation of hematoma in OR 4/5  - overall improved condition    will follow    Meenakshi Diaz NP  Hematology/Oncology  [facility]  [phone] (office)

## 2024-04-08 NOTE — PROGRESS NOTE ADULT - SUBJECTIVE AND OBJECTIVE BOX
Name of Patient : TAMI DUNHAM  MRN: [id number]  Date of visit: 04-08-24 @ 14:23      Subjective: Patient seen and examined. No new events except as noted.   Patient seen earlier this AM. Lying down in bed. Neuro at the bedside.   Reports LLE pain is controlled. ACE wrap and ASHLEY drains in place.     REVIEW OF SYSTEMS:    CONSTITUTIONAL: Generalized weakness; Pain controlled   EYES/ENT: No visual changes;  No vertigo or throat pain   NECK: No pain or stiffness  RESPIRATORY: No cough, wheezing, hemoptysis; No shortness of breath  CARDIOVASCULAR: No chest pain or palpitations  GASTROINTESTINAL: No abdominal or epigastric pain. No nausea, vomiting, or hematemesis; No diarrhea or constipation. No melena or hematochezia.  GENITOURINARY: No dysuria, frequency or hematuria  NEUROLOGICAL: No numbness or weakness  SKIN: LLE wrapped in ACE Wrap; ASHLEY drains in place; Ecchymosis; Pain controlled   All other review of systems is negative unless indicated above.    MEDICATIONS:  MEDICATIONS  (STANDING):  atorvastatin 20 milliGRAM(s) Oral at bedtime  gabapentin 300 milliGRAM(s) Oral every 8 hours  lacosamide 150 milliGRAM(s) Oral two times a day  multivitamin 1 Tablet(s) Oral daily  polyethylene glycol 3350 17 Gram(s) Oral daily  senna 2 Tablet(s) Oral at bedtime  tamsulosin 0.4 milliGRAM(s) Oral at bedtime      PHYSICAL EXAM:  T(C): 36.6 (04-08-24 @ 09:30), Max: 37.1 (04-07-24 @ 16:00)  HR: 79 (04-08-24 @ 09:30) (74 - 85)  BP: 122/77 (04-08-24 @ 09:30) (95/62 - 122/77)  RR: 18 (04-08-24 @ 09:30) (18 - 23)  SpO2: 98% (04-08-24 @ 09:30) (97% - 100%)  Wt(kg): --  I&O's Summary    07 Apr 2024 07:01  -  08 Apr 2024 07:00  --------------------------------------------------------  IN: 0 mL / OUT: 1080 mL / NET: -1080 mL    08 Apr 2024 07:01  -  08 Apr 2024 14:23  --------------------------------------------------------  IN: 0 mL / OUT: 25 mL / NET: -25 mL          Appearance: Awake, generalized weakness, lying down in bed 	  HEENT: Eyes are open   Lymphatic: No lymphadenopathy grossly   Cardiovascular: Normal    Respiratory: normal effort , clear  Gastrointestinal:  Soft, Non-tender  Skin: No rashes,  warm to touch  Psychiatry:  Mood & affect appropriate  Musculoskeletal: LLE wrapped in ACE Wrap, area of ecchymosis, ASHLEY drains with bloody output       04-07-24 @ 07:01  -  04-08-24 @ 07:00  --------------------------------------------------------  IN: 0 mL / OUT: 1080 mL / NET: -1080 mL    04-08-24 @ 07:01  -  04-08-24 @ 14:23  --------------------------------------------------------  IN: 0 mL / OUT: 25 mL / NET: -25 mL                              7.3    9.45  )-----------( 175      ( 08 Apr 2024 07:16 )             22.1               04-08    139  |  108  |  18  ----------------------------<  87  4.3   |  22  |  0.78    Ca    7.5<L>      08 Apr 2024 07:16  Phos  3.3     04-08  Mg     2.2     04-08    TPro  5.6<L>  /  Alb  2.9<L>  /  TBili  0.4  /  DBili  x   /  AST  12  /  ALT  15  /  AlkPhos  56  04-08    PT/INR - ( 08 Apr 2024 07:16 )   PT: 9.4 sec;   INR: 0.89 ratio         PTT - ( 08 Apr 2024 07:16 )  PTT:26.3 sec                   Urinalysis Basic - ( 08 Apr 2024 07:16 )    Color: x / Appearance: x / SG: x / pH: x  Gluc: 87 mg/dL / Ketone: x  / Bili: x / Urobili: x   Blood: x / Protein: x / Nitrite: x   Leuk Esterase: x / RBC: x / WBC x   Sq Epi: x / Non Sq Epi: x / Bacteria: x            < from: CT Lower Extremity w/ IV Cont, Left (04.04.24 @ 01:34) >    ACC: [id number] EXAM:  CT ABDOMEN AND PELVIS IC   ORDERED BY:  LIZZETTE MABRY     ACC: [id number] EXAM:  CT LWR EXT IC LT   ORDERED BY:  LIZZETTE MABRY     PROCEDURE DATE:  04/04/2024          INTERPRETATION:  CLINICAL INFORMATION: Left upper thigh swelling and   tenderness on Lovenox.    COMPARISON: CT abdomen and pelvis 1/21/2024.    CONTRAST/COMPLICATIONS:  IV Contrast: Omnipaque 350  90 cc administered   10 cc discarded  Oral Contrast: NONE  Complications: None reported at time of study completion    PROCEDURE:  CT of the Abdomen and Pelvis was performed. CT of the Left Lower   Extremity from the hip to the knee was additionally performed.  Sagittal and coronal reformats were performed.    FINDINGS:  LOWER CHEST: Bilateral lower lobe dependent atelectasis.    LIVER: Riedel's lobe. Stable left hepatic cyst.  BILE DUCTS: Normal caliber.  GALLBLADDER: Within normal limits.  SPLEEN: Within normal limits.  PANCREAS: Within normal limits.  ADRENALS: Within normal limits.  KIDNEYS/URETERS: Left extrarenal pelvis versus parapelvic cyst. No   hydronephrosis. Right kidney within normal limits.    BLADDER: Within normal limits.  REPRODUCTIVE ORGANS: Prostate is enlarged.    BOWEL: No bowel obstruction or inflammation. Scattered sigmoid   diverticulosis without diverticulitis. Appendix is normal aside from   retained oral contrast.  PERITONEUM: No ascites.  VESSELS: Atherosclerotic changes. Circumaortic left renal vein.  RETROPERITONEUM/LYMPH NODES: No lymphadenopathy.  ABDOMINAL WALL: Diastases of the abdominis rectus muscles. Small   umbilical hernia containing a nonobstructed loops of small bowel. Small   fat-containing left inguinal hernia. 3.9 x 1.7 cm left lower quadrant   anterior abdominal wall subcutaneous soft tissue attenuation likely a   sequela of injection. 3.6 x 2.0 x 3.8 cm slightly high attenuation   subcutaneous collection in the anterior upper right thigh concerning for   a hematoma. Mild subcutaneous inflammatory change in the visualized   anterior and lateral upper right thigh with associated foci of   subcutaneous gas.  BONES: Within normal limits.    LEFT LOWER EXTREMITY:  BONES: No fracture or dislocation. Bone island in the left superior pubic   ramus.  SOFT TISSUES: 5.2 x 11.6 x 9.9 cm (AP x TRV x CC) complex collection in   the anteromedial upper thigh containing layering hemorrhage and scattered   foci of high attenuation concerning for active extravasation of   administered intravenous contrast. Moderate subcutaneous inflammatory   change and skin thickening in the anterior and medial upper to mid thigh.    IMPRESSION:  1. No evidence of acute intra-abdominal or intrapelvic pathology.  2. Moderate to large subcutaneous hematoma in the anteromedial left upper   thigh containing scattered foci of high attenuation concerning for active   hemorrhage within the hematoma.  3. Small collection in the anterior upper right thigh suspicious for a   hematoma. Mild subcutaneous inflammatory change in the visualized   anterior and lateral upper right thigh with associated foci of   subcutaneous gas. Correlate with recent procedural history.    Findings were discussed with Dr. Janeth Ramirez 4/4/2024 1:44 AM by Dr. Jorge Phipps with read back confirmation.    --- End of Report ---           JORGE PHIPPS MD; Resident Radiologist  This document has been electronically signed.  SHIVA QUINONEZ MD; Attending Radiologist  This document has been electronically signed. Apr 4 2024  2:25AM    < end of copied text >        < from: CT Angio Lower Extremity w/ IV Cont, Left (04.04.24 @ 05:31) >    ACC: [id number] EXAM:  CT ANGIO LWR EXT (W)AW IC LT   ORDERED BY:  LIZZETTE MABRY     PROCEDURE DATE:  04/04/2024          INTERPRETATION:  CLINICAL INFORMATION: Left thigh swelling. Assess for   extravasation.    TECHNIQUE: CT of the left lower extremity from the hip to the knee was   performed in bone and soft tissue windows with coronal and sagittal   reformats. No intravenous contrast was administered.    COMPARISON: No similar prior studies are available for comparison.    FINDINGS:    BONES: No fracture or dislocation. Bone island in the left superior pubic   ramus.    SOFT TISSUES: Interval increase in size of an anteromedial upper thigh   subcutaneous hematoma now measuring 5.7 x 15.4 x 12.8 cm (AP x TRV x CC)   with scattered foci ofhigh attenuation within the hematoma compatible   with active extravasation of administered intravenous contrast. Increased   mass effect on the medial compartment of the thigh by the subcutaneous   hematoma. Interval high attenuation enlargement of the left gracilis   muscle compatible with an intramuscular hematoma which could be extending   from the subcutaneous hematoma. Moderate subcutaneous inflammatory change   and skin thickening in the anterior and medial upper to mid thigh.    IMPRESSION:  1. Interval increase in size of an anteromedial left upper thigh   subcutaneous hematoma with scattered foci of high attenuation within the   hematoma compatible with active hemorrhage.  2. New left gracilis intramuscular hematoma which could be extending from   the subcutaneous hematoma.    --- End of Report ---            SHIVA QUINONEZ MD; Attending Radiologist  This document has been electronically signed. Apr 4 2024  5:33AM    < end of copied text >      < from: CT Angio Lower Extremity w/ IV Cont, Left (04.05.24 @ 06:23) >    ACC: [id number] EXAM:  CT ANGIO LWR EXT (W)AW IC LT   ORDERED BY: NAT MICHAEL     PROCEDURE DATE:  04/05/2024          INTERPRETATION:  CLINICAL INFORMATION: Evaluate for contrast   extravasation. Increased left thigh hematoma.    COMPARISON: CTA left lower extremity 4/4/2024.    CONTRAST/COMPLICATIONS:  IV Contrast: Omnipaque 350  90 cc administered   10 cc discarded  Oral Contrast: NONE  Complications: None reported at time of study completion    PROCEDURE:  CT angiogram of the left lowerextremity from the hip to the knee was   performed. Sagittal and coronal reformats as well as 3D reconstructions   were performed.    FINDINGS:    Interval increase in size of left anteromedial upper thigh subcutaneous   hematoma measuring 19.5 x 8.6x 23.7 cm (TV x AP x CC), previously 15.4 x   5.7 x 12.8 cm. Increased mass effect on the medial compartment of the   left thigh. No arterial contrast blush on this exam. Moderate   subcutaneous left hip and thigh soft tissue swelling.    IMPRESSION:  Enlarging left anteromedial upper thigh subcutaneous hematoma with   increased mass effect on the medial compartment musculature. No evidence   of arterial contrast extravasation on this exam.    Findings discussed between Davis Cardona MD and Jorge Phipps MD on 7:41am   on 4/5/2024.    --- End of Report ---           JORGE PHIPPS MD; Resident Radiologist  This document has been electronically signed.  FAMILIA NEGRETE MD; Attending Radiologist  This document has been electronically signed. Apr 5 2024  9:46AM    < end of copied text >      < from: [facility] Duplex Lower Ext Vein Scan, Justin (04.06.24 @ 20:11) >  IMPRESSION:  No evidence of deep venous thrombosis in either lower extremity.      < end of copied text >

## 2024-04-08 NOTE — ADVANCED PRACTICE NURSE CONSULT - ASSESSMENT
arrived on unit, patient was found lying in a low air loss pressure redistribution support surface style bed. Patient is  alert and oriented and gave consent to skin consult. patient able to turn independently and able view his skin.  bilateral sacrum/buttock skin dry and intact no discoloration noted at time of consult.   bilateral lower extremity with area of ecchymosis.     patient  was educated  on the importance  for turning  and positioning  every 2 hours. The use of waffle cushion when out of bed  to chair,  and to shift his Weight every 2 hours while in chair. The importance a keeping his  skin clean and dry and  to offload feet/heels ,  and optimal nutrition.

## 2024-04-08 NOTE — PROGRESS NOTE ADULT - ASSESSMENT
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 Noticed within the last hour. No falls or trauma to the area. Administered Lovenox just prior to arrival. Denies fevers, chills, nausea, vomiting, urinary symptoms. No recent surgeries  found left thigh hematoma   s/p 2 units of PRBC   s/p hematoma evacuation 4/5, now doing well    Impression:   1) chronic strokes, resid minimal WFD  2) seizure d/o 2/2 strokes  3) ADHD  4) vertigo BPPV, stable   5) new L thigh hematoma   6) LLE compartment syndrome        - may need IVC filter ? h/o DVTs   - Lovenox now held.  was injecting in thigh at site of hematoma ; last admission Lovenox started for DVT and changed to BID dosing   - for ADHD gets Vyvanse 50mg daily  - for seizure he is on vimpat 150 mg BID  - for secondary stroke prevention should be on asa 81mg daily and full dose lovenox . now held ; resume asa when able.  need to discuss with surgery and heme/onc next steps regarding AC   - statin therapy with LDL goal < 70mg/dL; atorvastatin 20 at home   - meclizline PRN   - PT/OT   - check FS, glucose control <180  - GI/DVT ppx   - Thank you for allowing me to participate in the care of this patient. Call with questions.   Braxton Forde MD  Vascular Neurology  Office: [phone]

## 2024-04-08 NOTE — PROGRESS NOTE ADULT - ASSESSMENT
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 On admission, vitals significant for hypotension and labs showing anemia with CTA showing large hematoma of the LLE and small RLE hematoma with repeat CTA showing interval increase of the hematoma and new small LLE hematoma s/p 2units pRBCs. He is now admitted to the MICU for further management.       # LE Hematoma, Anemia   - 4/4 CT A w/ Mod- large SubC hematoma in the anteromedial L upper thigh containing scattered foci of high attenuation concerning for active hemorrhage within the hematoma; Small collection in the anterior upper right thigh suspicious for a hematoma. Mild subcutaneous inflammatory change in the visualized anterior and lateral upper right thigh with associated foci of subcutaneous gas.   - 4/5 CT w/ Interval increase in size of an anteromedial L upper thigh subc  hematoma with scattered foci of high attenuation within the hematoma compatible with active hemorrhage; New left gracilis intramuscular hematoma which could be extending from the subcutaneous hematoma.  - Duplex negative for DVT   - S/P Hematoma evacuation with Vascular on 4/5   - AC and AP on hold   - S/P PRBCs  X 4   - Pulse checks per protocol  - Monitor LE closely  - Serial CBC, Maintain active T/S, transfuse for Hgb < 7.0  - IR, Vascular, MICU care appreciated   - Per vascular surgery     #leukocytosis  - noted to have elevated WBC  - likely inflammatory iso blood loss with low concern for infection  - trend WBC and fever curve  - If febrile, check pan cultures    #Seizure disorder  # CVA with residual expressive aphasia  - C/w home AEDs (lacosamide and vyanese   - C/w home gabapentin   - Seizure precautions  - Neuro eval appreciated; F/u recs     #HLD  - C/w home Lipitor    #BPH  - C/w flomax    #DVTs  - hx of bilateral DVTs --> Repeat duplex negative   - hold lovenox and AC iso bleed      Discussed with Attending and ACP.  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 On admission, vitals significant for hypotension and labs showing anemia with CTA showing large hematoma of the LLE and small RLE hematoma with repeat CTA showing interval increase of the hematoma and new small LLE hematoma s/p 2units pRBCs. He is now admitted to the MICU for further management.       # LE Hematoma, Anemia   - 4/4 CT A w/ Mod- large SubC hematoma in the anteromedial L upper thigh containing scattered foci of high attenuation concerning for active hemorrhage within the hematoma; Small collection in the anterior upper right thigh suspicious for a hematoma. Mild subcutaneous inflammatory change in the visualized anterior and lateral upper right thigh with associated foci of subcutaneous gas.   - 4/5 CT w/ Interval increase in size of an anteromedial L upper thigh subc  hematoma with scattered foci of high attenuation within the hematoma compatible with active hemorrhage; New left gracilis intramuscular hematoma which could be extending from the subcutaneous hematoma.  - Duplex negative for DVT   - S/P Hematoma evacuation with Vascular on 4/5. Monitor drain output   - AC and AP on hold   - S/P PRBCs  X 4   - Pulse checks per protocol  - Monitor LE closely  - Serial CBC, Maintain active T/S, transfuse for Hgb < 7.0  - IR, Vascular, MICU care appreciated   - Per vascular surgery     #leukocytosis  - noted to have elevated WBC  - likely inflammatory iso blood loss with low concern for infection  - trend WBC and fever curve  - If febrile, check pan cultures    #Seizure disorder  # CVA with residual expressive aphasia  - C/w home AEDs (lacosamide and vyanese   - C/w home gabapentin   - Seizure precautions  - Neuro eval appreciated; F/u recs     #HLD  - C/w home Lipitor    #BPH  - C/w flomax    #DVTs  - hx of bilateral DVTs --> Repeat duplex negative   - hold lovenox and AC iso bleed      Discussed with Attending and ACP.

## 2024-04-09 LAB
ANION GAP SERPL CALC-SCNC: 11 MMOL/L — SIGNIFICANT CHANGE UP (ref 5–17)
BUN SERPL-MCNC: 16 MG/DL — SIGNIFICANT CHANGE UP (ref 7–23)
CALCIUM SERPL-MCNC: 7.9 MG/DL — LOW (ref 8.4–10.5)
CHLORIDE SERPL-SCNC: 107 MMOL/L — SIGNIFICANT CHANGE UP (ref 96–108)
CO2 SERPL-SCNC: 22 MMOL/L — SIGNIFICANT CHANGE UP (ref 22–31)
CREAT SERPL-MCNC: 0.9 MG/DL — SIGNIFICANT CHANGE UP (ref 0.5–1.3)
EGFR: 102 ML/MIN/1.73M2 — SIGNIFICANT CHANGE UP
GLUCOSE SERPL-MCNC: 91 MG/DL — SIGNIFICANT CHANGE UP (ref 70–99)
HCT VFR BLD CALC: 22.8 % — LOW (ref 39–50)
HCT VFR BLD CALC: 24 % — LOW (ref 39–50)
HGB BLD-MCNC: 7.3 G/DL — LOW (ref 13–17)
HGB BLD-MCNC: 7.8 G/DL — LOW (ref 13–17)
MAGNESIUM SERPL-MCNC: 2.3 MG/DL — SIGNIFICANT CHANGE UP (ref 1.6–2.6)
MCHC RBC-ENTMCNC: 29.7 PG — SIGNIFICANT CHANGE UP (ref 27–34)
MCHC RBC-ENTMCNC: 29.8 PG — SIGNIFICANT CHANGE UP (ref 27–34)
MCHC RBC-ENTMCNC: 32 GM/DL — SIGNIFICANT CHANGE UP (ref 32–36)
MCHC RBC-ENTMCNC: 32.5 GM/DL — SIGNIFICANT CHANGE UP (ref 32–36)
MCV RBC AUTO: 91.3 FL — SIGNIFICANT CHANGE UP (ref 80–100)
MCV RBC AUTO: 93.1 FL — SIGNIFICANT CHANGE UP (ref 80–100)
NRBC # BLD: 0 /100 WBCS — SIGNIFICANT CHANGE UP (ref 0–0)
NRBC # BLD: 0 /100 WBCS — SIGNIFICANT CHANGE UP (ref 0–0)
PHOSPHATE SERPL-MCNC: 3.3 MG/DL — SIGNIFICANT CHANGE UP (ref 2.5–4.5)
PLATELET # BLD AUTO: 225 K/UL — SIGNIFICANT CHANGE UP (ref 150–400)
PLATELET # BLD AUTO: 261 K/UL — SIGNIFICANT CHANGE UP (ref 150–400)
POTASSIUM SERPL-MCNC: 4.3 MMOL/L — SIGNIFICANT CHANGE UP (ref 3.5–5.3)
POTASSIUM SERPL-SCNC: 4.3 MMOL/L — SIGNIFICANT CHANGE UP (ref 3.5–5.3)
RBC # BLD: 2.45 M/UL — LOW (ref 4.2–5.8)
RBC # BLD: 2.63 M/UL — LOW (ref 4.2–5.8)
RBC # FLD: 14.2 % — SIGNIFICANT CHANGE UP (ref 10.3–14.5)
RBC # FLD: 14.6 % — HIGH (ref 10.3–14.5)
SODIUM SERPL-SCNC: 140 MMOL/L — SIGNIFICANT CHANGE UP (ref 135–145)
WBC # BLD: 9.54 K/UL — SIGNIFICANT CHANGE UP (ref 3.8–10.5)
WBC # BLD: 9.68 K/UL — SIGNIFICANT CHANGE UP (ref 3.8–10.5)
WBC # FLD AUTO: 9.54 K/UL — SIGNIFICANT CHANGE UP (ref 3.8–10.5)
WBC # FLD AUTO: 9.68 K/UL — SIGNIFICANT CHANGE UP (ref 3.8–10.5)

## 2024-04-09 PROCEDURE — 99223 1ST HOSP IP/OBS HIGH 75: CPT

## 2024-04-09 RX ORDER — ASPIRIN/CALCIUM CARB/MAGNESIUM 324 MG
81 TABLET ORAL DAILY
Refills: 0 | Status: DISCONTINUED | OUTPATIENT
Start: 2024-04-09 | End: 2024-04-13

## 2024-04-09 RX ORDER — ENOXAPARIN SODIUM 100 MG/ML
40 INJECTION SUBCUTANEOUS EVERY 24 HOURS
Refills: 0 | Status: DISCONTINUED | OUTPATIENT
Start: 2024-04-09 | End: 2024-04-10

## 2024-04-09 RX ADMIN — Medication 1 TABLET(S): at 11:25

## 2024-04-09 RX ADMIN — SENNA PLUS 2 TABLET(S): 8.6 TABLET ORAL at 20:17

## 2024-04-09 RX ADMIN — LACOSAMIDE 150 MILLIGRAM(S): 50 TABLET ORAL at 05:17

## 2024-04-09 RX ADMIN — TAMSULOSIN HYDROCHLORIDE 0.4 MILLIGRAM(S): 0.4 CAPSULE ORAL at 20:16

## 2024-04-09 RX ADMIN — GABAPENTIN 300 MILLIGRAM(S): 400 CAPSULE ORAL at 20:16

## 2024-04-09 RX ADMIN — HYDROMORPHONE HYDROCHLORIDE 0.25 MILLIGRAM(S): 2 INJECTION INTRAMUSCULAR; INTRAVENOUS; SUBCUTANEOUS at 03:16

## 2024-04-09 RX ADMIN — HYDROMORPHONE HYDROCHLORIDE 0.25 MILLIGRAM(S): 2 INJECTION INTRAMUSCULAR; INTRAVENOUS; SUBCUTANEOUS at 02:16

## 2024-04-09 RX ADMIN — GABAPENTIN 300 MILLIGRAM(S): 400 CAPSULE ORAL at 05:17

## 2024-04-09 RX ADMIN — ATORVASTATIN CALCIUM 20 MILLIGRAM(S): 80 TABLET, FILM COATED ORAL at 20:16

## 2024-04-09 RX ADMIN — GABAPENTIN 300 MILLIGRAM(S): 400 CAPSULE ORAL at 11:25

## 2024-04-09 RX ADMIN — LACOSAMIDE 150 MILLIGRAM(S): 50 TABLET ORAL at 11:27

## 2024-04-09 RX ADMIN — Medication 81 MILLIGRAM(S): at 16:13

## 2024-04-09 NOTE — PROGRESS NOTE ADULT - ASSESSMENT
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 Smith presents with lower extremity pain. He was found to have a hematoma on the medial left thigh. The patient is hemodynamically stable with Hgb 12 g/dL and intact motor and sensory functions in the lower extremity. CTA demonstrating active subcutaneous and gracilis hematomas, large in size compared to CT performed four hours prior. S/p OR on 4/5 for LLE hematoma evacuation.    Recommendations:  - Daily dressing may be done by nursing staff - please apply bacitracin to incision and place adaptic over incision  - superior ASHLEY drain dc'ed 4/9  - If concern for H/H drop recommend repeat CTA to eval for acute extrav or if unstable HD recommend considering re-consult of IR and CTA  - appreciate care per primary team     Vascular Surgery   [phone]      53M hx chronic DVTs and PEs on Lovenox 150 qd, s/p fasciotomy 2023 with Dr. Smith presents with lower extremity pain. He was found to have a hematoma on the medial left thigh. The patient is hemodynamically stable with Hgb 12 g/dL and intact motor and sensory functions in the lower extremity. CTA demonstrating active subcutaneous and gracilis hematomas, large in size compared to CT performed four hours prior. S/p OR on 4/5 for LLE hematoma evacuation.    Recommendations:  - Daily dressing may be done by nursing staff - please apply bacitracin to incision and place adaptic over incision  - superior ASHLEY drain dc'ed 4/9  - If concern for H/H drop recommend repeat CTA to eval for acute extrav or if unstable HD recommend considering re-consult of IR and CTA  - appreciate care per primary team     Vascular Surgery   [phone]

## 2024-04-09 NOTE — PROGRESS NOTE ADULT - ASSESSMENT
53y Male with pmhx CVA abd Hx of DVT's    Anemia  - patient anemic (Hb baseline 13-14) 2/2 hematoma; S/P PRBC 2U in ED.    - follow up IR and vascular recs  - transfuse for Hb>7  - monitor cbc Q12    DVTs  - hx of bilateral DVTs (noted to resolve 1/24)  - hold lovenox and AC for now due to large hematoma  - pt last seen Dr Varela on 12/2023  - monitor for now    MSK  - patient's LLE swollen with concern for compartment syndrome  - Interval increase in size ( 61-->71cm)   - f/u ortho and vascular recs for management for possible fasciotomy   - pain control per primary team  - s/p evacuation of hematoma in OR 4/5  - overall improved condition  - continue ongoing need for acute inpatient rehab    will follow    Meenakshi Diaz NP  Hematology/Oncology  [facility]  [phone] (office)

## 2024-04-09 NOTE — PROGRESS NOTE ADULT - ASSESSMENT
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 On admission, vitals significant for hypotension and labs showing anemia with CTA showing large hematoma of the LLE and small RLE hematoma with repeat CTA showing interval increase of the hematoma and new small LLE hematoma s/p 2units pRBCs. He is now admitted to the MICU for further management.       # LE Hematoma, Anemia   - 4/4 CT A w/ Mod- large SubC hematoma in the anteromedial L upper thigh containing scattered foci of high attenuation concerning for active hemorrhage within the hematoma; Small collection in the anterior upper right thigh suspicious for a hematoma. Mild subcutaneous inflammatory change in the visualized anterior and lateral upper right thigh with associated foci of subcutaneous gas.   - 4/5 CT w/ Interval increase in size of an anteromedial L upper thigh subc  hematoma with scattered foci of high attenuation within the hematoma compatible with active hemorrhage; New left gracilis intramuscular hematoma which could be extending from the subcutaneous hematoma.  - Duplex negative for DVT   - S/P Hematoma evacuation with Vascular on 4/5. Monitor drain output   - AC and AP on hold -->Started on ASA and Lovenox PPX dose. Monitor H/H   - S/P PRBCs  X 4   - Pulse checks per protocol;  Monitor LE closely  - Serial CBC, Maintain active T/S, transfuse for Hgb < 7.0  - IR, Vascular, MICU care appreciated   - Per vascular surgery     #leukocytosis  - noted to have elevated WBC  - likely inflammatory iso blood loss with low concern for infection  - trend WBC and fever curve  - If febrile, check pan cultures    #Seizure disorder  # CVA with residual expressive aphasia  - C/w home AEDs (lacosamide and vyanese   - C/w home gabapentin   - Seizure precautions  - Neuro eval appreciated; F/u recs     #HLD  - C/w home Lipitor    #BPH  - C/w flomax    #DVTs  - hx of bilateral DVTs --> Repeat duplex negative   - ASA and DVT PPX resumed       Discussed with Attending and ACP.

## 2024-04-09 NOTE — CONSULT NOTE ADULT - SUBJECTIVE AND OBJECTIVE BOX
Spoke to Rocio at the [facility] and scheduled procedure on 06/26/17 at 6:45am arriving at 6am.    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 Otherwise, there was no trauma to the area nor falls. The pain is described as throbbing and does not radiate. He did not try any supportive measures and came to the hospital directly. Of note, the patient was on eliquis when he was found to have bilateral DVTs, however this was changed to lovenox when he had a CVA while on eliquis.    On admission, the patient's vitals were noted to be within normal limits, however he then became hypotensive for which he received 2u pRBCs and protamine sulfate. His labs were significant for anemia and leukocytosis and a CTA showed large hematoma in the LLE and small hematoma of the RLE. He is now admitted to the MICU for further management.    (04 Apr 2024 09:05)    Patient was admitted on 4/4, s/p hematoma evacuation on 4/5, seen today, reports was at [facility], developed compartment syndrome last year, s/p left fasciotomy for compartment syndrome, then went to [facility] x months, and then the [facility], now living in [facility]. He continues to have left foot pain, has AFO at home.     REVIEW OF SYSTEMS  Denies chest pain, SOB, N/V, F/C, abdominal pain     VITALS  T(C): 36.7 (04-09-24 @ 09:00), Max: 37.1 (04-09-24 @ 00:12)  HR: 71 (04-09-24 @ 09:00) (71 - 79)  BP: 108/68 (04-09-24 @ 09:00) (106/69 - 118/74)  RR: 18 (04-09-24 @ 09:00) (18 - 18)  SpO2: 98% (04-09-24 @ 09:00) (96% - 99%)  Wt(kg): --    PAST MEDICAL & SURGICAL HISTORY  History of TIAs    CVA (cerebrovascular accident)    HLD (hyperlipidemia)    Vertigo    Unresponsiveness    Colon polyp    Expressive aphasia    Spinal stenosis    Seizure disorder    History of fasciotomy    H/O arthroscopy of knee    S/P excision of neuroma    History of loop recorder        FUNCTIONAL HISTORY  Lives alone, apt, 8 steps to enter   Independent ADLs, uses walker     CURRENT FUNCTIONAL STATUS  PT  4/7  bed mobility CG  transfers min assist with RW  gait min assist x 5 feet, bed to chair     OT 4/7  bed mobility min assist   transfers min assist with RW    RECENT LABS/IMAGING  CBC Full  -  ( 09 Apr 2024 06:59 )  WBC Count : 9.68 K/uL  RBC Count : 2.45 M/uL  Hemoglobin : 7.3 g/dL  Hematocrit : 22.8 %  Platelet Count - Automated : 225 K/uL  Mean Cell Volume : 93.1 fl  Mean Cell Hemoglobin : 29.8 pg  Mean Cell Hemoglobin Concentration : 32.0 gm/dL  Auto Neutrophil # : x  Auto Lymphocyte # : x  Auto Monocyte # : x  Auto Eosinophil # : x  Auto Basophil # : x  Auto Neutrophil % : x  Auto Lymphocyte % : x  Auto Monocyte % : x  Auto Eosinophil % : x  Auto Basophil % : x    04-09    140  |  107  |  16  ----------------------------<  91  4.3   |  22  |  0.90    Ca    7.9<L>      09 Apr 2024 06:59  Phos  3.3     04-09  Mg     2.3     04-09    TPro  5.6<L>  /  Alb  2.9<L>  /  TBili  0.4  /  DBili  x   /  AST  12  /  ALT  15  /  AlkPhos  56  04-08    Urinalysis Basic - ( 09 Apr 2024 06:59 )    Color: x / Appearance: x / SG: x / pH: x  Gluc: 91 mg/dL / Ketone: x  / Bili: x / Urobili: x   Blood: x / Protein: x / Nitrite: x   Leuk Esterase: x / RBC: x / WBC x   Sq Epi: x / Non Sq Epi: x / Bacteria: x    < from: CT Angio Lower Extremity w/ IV Cont, Left (04.05.24 @ 06:23) >    IMPRESSION:  Enlarging left anteromedial upper thigh subcutaneous hematoma with   increased mass effect on the medial compartment musculature. No evidence   of arterial contrast extravasation on this exam.      < end of copied text >      ALLERGIES  No Known Allergies      MEDICATIONS   atorvastatin 20 milliGRAM(s) Oral at bedtime  bacitracin   Ointment 1 Application(s) Topical daily PRN  gabapentin 300 milliGRAM(s) Oral every 8 hours  HYDROmorphone  Injectable 0.5 milliGRAM(s) IV Push every 4 hours PRN  HYDROmorphone  Injectable 0.25 milliGRAM(s) IV Push four times a day PRN  lacosamide 150 milliGRAM(s) Oral two times a day  multivitamin 1 Tablet(s) Oral daily  polyethylene glycol 3350 17 Gram(s) Oral daily  senna 2 Tablet(s) Oral at bedtime  tamsulosin 0.4 milliGRAM(s) Oral at bedtime      ----------------------------------------------------------------------------------------  PHYSICAL EXAM  Constitutional - NAD, Comfortable, in bed   Chest - Breathing comfortably room air   Cardiovascular - S1S2   Abdomen - Soft   Extremities -Left thigh +drain, dressing saturated, leg with ace wrap  Neurologic Exam -   follows commands                 Cognitive - Awake, Alert, AAO to self, place, date, year, situation     Communication - min word finding difficulty        Motor -                     LEFT    UE - ShAB 5/5, EF 5/5, EE 5/5, WE 5/5,  5/5                    RIGHT UE - ShAB 5/5, EF 5/5, EE 5/5, WE 5/5,  5/5                    LEFT    LE - DF 3/5, PF 3/5                    RIGHT LE - HF 5/5, KE 5/5, DF 5/5, PF 5/5        Sensory - Intact to LT     Psychiatric - Mood stable, Affect WNL  ----------------------------------------------------------------------------------------  ASSESSMENT/PLAN  53yMale h/o CVA, aphasia, seizures, DVT, left LE fasciotomy for compartment syndrome with functional deficits after left/right thigh hematoma, s/p hematoma evacuation, with debility   anemia, s/p transfusion, continue to monitor   seizures, on lacosamide  Pain - Tylenol, dilaudid, gabapentin   DVT PPX - SCDs  Rehab - Will continue to follow for ongoing rehab needs and recommendations.   continue bedside therapy, PT/OT    Recommend ACUTE inpatient rehabilitation for the functional deficits consisting of 3 hours of therapy/day & x 2-4 weeks, 24 hour RN/daily PMR physician for comorbid medical management. Patient will be able to tolerate 3 hours a day.   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 Otherwise, there was no trauma to the area nor falls. The pain is described as throbbing and does not radiate. He did not try any supportive measures and came to the hospital directly. Of note, the patient was on eliquis when he was found to have bilateral DVTs, however this was changed to lovenox when he had a CVA while on eliquis.    On admission, the patient's vitals were noted to be within normal limits, however he then became hypotensive for which he received 2u pRBCs and protamine sulfate. His labs were significant for anemia and leukocytosis and a CTA showed large hematoma in the LLE and small hematoma of the RLE. He is now admitted to the MICU for further management.    (04 Apr 2024 09:05)    Patient was admitted on 4/4, s/p hematoma evacuation on 4/5, seen today, reports was at [facility], developed compartment syndrome last year, s/p left fasciotomy for compartment syndrome, then went to [facility] x months, and then the [facility] [place], now living in [facility]. He continues to have left foot pain, has AFO at home.     REVIEW OF SYSTEMS  Denies chest pain, SOB, N/V, F/C, abdominal pain     VITALS  T(C): 36.7 (04-09-24 @ 09:00), Max: 37.1 (04-09-24 @ 00:12)  HR: 71 (04-09-24 @ 09:00) (71 - 79)  BP: 108/68 (04-09-24 @ 09:00) (106/69 - 118/74)  RR: 18 (04-09-24 @ 09:00) (18 - 18)  SpO2: 98% (04-09-24 @ 09:00) (96% - 99%)  Wt(kg): --    PAST MEDICAL & SURGICAL HISTORY  History of TIAs    CVA (cerebrovascular accident)    HLD (hyperlipidemia)    Vertigo    Unresponsiveness    Colon polyp    Expressive aphasia    Spinal stenosis    Seizure disorder    History of fasciotomy    H/O arthroscopy of knee    S/P excision of neuroma    History of loop recorder        FUNCTIONAL HISTORY  Lives alone, apt, 8 steps to enter   Independent ADLs, uses walker     CURRENT FUNCTIONAL STATUS  PT  4/7  bed mobility CG  transfers min assist with RW  gait min assist x 5 feet, bed to chair     OT 4/7  bed mobility min assist   transfers min assist with RW    RECENT LABS/IMAGING  CBC Full  -  ( 09 Apr 2024 06:59 )  WBC Count : 9.68 K/uL  RBC Count : 2.45 M/uL  Hemoglobin : 7.3 g/dL  Hematocrit : 22.8 %  Platelet Count - Automated : 225 K/uL  Mean Cell Volume : 93.1 fl  Mean Cell Hemoglobin : 29.8 pg  Mean Cell Hemoglobin Concentration : 32.0 gm/dL  Auto Neutrophil # : x  Auto Lymphocyte # : x  Auto Monocyte # : x  Auto Eosinophil # : x  Auto Basophil # : x  Auto Neutrophil % : x  Auto Lymphocyte % : x  Auto Monocyte % : x  Auto Eosinophil % : x  Auto Basophil % : x    04-09    140  |  107  |  16  ----------------------------<  91  4.3   |  22  |  0.90    Ca    7.9<L>      09 Apr 2024 06:59  Phos  3.3     04-09  Mg     2.3     04-09    TPro  5.6<L>  /  Alb  2.9<L>  /  TBili  0.4  /  DBili  x   /  AST  12  /  ALT  15  /  AlkPhos  56  04-08    Urinalysis Basic - ( 09 Apr 2024 06:59 )    Color: x / Appearance: x / SG: x / pH: x  Gluc: 91 mg/dL / Ketone: x  / Bili: x / Urobili: x   Blood: x / Protein: x / Nitrite: x   Leuk Esterase: x / RBC: x / WBC x   Sq Epi: x / Non Sq Epi: x / Bacteria: x    < from: CT Angio Lower Extremity w/ IV Cont, Left (04.05.24 @ 06:23) >    IMPRESSION:  Enlarging left anteromedial upper thigh subcutaneous hematoma with   increased mass effect on the medial compartment musculature. No evidence   of arterial contrast extravasation on this exam.      < end of copied text >      ALLERGIES  No Known Allergies      MEDICATIONS   atorvastatin 20 milliGRAM(s) Oral at bedtime  bacitracin   Ointment 1 Application(s) Topical daily PRN  gabapentin 300 milliGRAM(s) Oral every 8 hours  HYDROmorphone  Injectable 0.5 milliGRAM(s) IV Push every 4 hours PRN  HYDROmorphone  Injectable 0.25 milliGRAM(s) IV Push four times a day PRN  lacosamide 150 milliGRAM(s) Oral two times a day  multivitamin 1 Tablet(s) Oral daily  polyethylene glycol 3350 17 Gram(s) Oral daily  senna 2 Tablet(s) Oral at bedtime  tamsulosin 0.4 milliGRAM(s) Oral at bedtime      ----------------------------------------------------------------------------------------  PHYSICAL EXAM  Constitutional - NAD, Comfortable, in bed   Chest - Breathing comfortably room air   Cardiovascular - S1S2   Abdomen - Soft   Extremities -Left thigh +drain, dressing saturated, leg with ace wrap  Neurologic Exam -   follows commands                 Cognitive - Awake, Alert, AAO to self, place, date, year, situation     Communication - min word finding difficulty        Motor -                     LEFT    UE - ShAB 5/5, EF 5/5, EE 5/5, WE 5/5,  5/5                    RIGHT UE - ShAB 5/5, EF 5/5, EE 5/5, WE 5/5,  5/5                    LEFT    LE - DF 3/5, PF 3/5                    RIGHT LE - HF 5/5, KE 5/5, DF 5/5, PF 5/5        Sensory - Intact to LT     Psychiatric - Mood stable, Affect WNL  ----------------------------------------------------------------------------------------  ASSESSMENT/PLAN  53yMale h/o CVA, aphasia, seizures, DVT, left LE fasciotomy for compartment syndrome with functional deficits after left/right thigh hematoma, s/p hematoma evacuation, with debility   anemia, s/p transfusion, continue to monitor   seizures, on lacosamide  Pain - Tylenol, dilaudid, gabapentin   DVT PPX - SCDs  Rehab - Will continue to follow for ongoing rehab needs and recommendations.   continue bedside therapy, PT/OT    Recommend ACUTE inpatient rehabilitation for the functional deficits consisting of 3 hours of therapy/day x 2-4 weeks, 24 hour RN/daily PMR physician for comorbid medical management. Patient will be able to tolerate 3 hours a day.

## 2024-04-09 NOTE — PROGRESS NOTE ADULT - SUBJECTIVE AND OBJECTIVE BOX
Name of Patient : TAMI DUNHAM  MRN: [id number]  Date of visit: 04-09-24 @ 14:56      Subjective: Patient seen and examined. No new events except as noted.   Patient seen earlier this AM. Sitting up in bed.   Reports pain is overall controlled.   Hgb stable. Started on ASA and Lovenox DVT PPX dose.    REVIEW OF SYSTEMS:    CONSTITUTIONAL: Generalized weakness; Pain controlled   EYES/ENT: No visual changes;  No vertigo or throat pain   NECK: No pain or stiffness  RESPIRATORY: No cough, wheezing, hemoptysis; No shortness of breath  CARDIOVASCULAR: No chest pain or palpitations  GASTROINTESTINAL: No abdominal or epigastric pain. No nausea, vomiting, or hematemesis; No diarrhea or constipation. No melena or hematochezia.  GENITOURINARY: No dysuria, frequency or hematuria  NEUROLOGICAL: No numbness or weakness  SKIN: LLE wrapped in ACE Wrap; ASHLEY drains in place; Ecchymosis; Pain controlled   All other review of systems is negative unless indicated above.    MEDICATIONS:  MEDICATIONS  (STANDING):  aspirin  chewable 81 milliGRAM(s) Oral daily  atorvastatin 20 milliGRAM(s) Oral at bedtime  enoxaparin Injectable 40 milliGRAM(s) SubCutaneous every 24 hours  gabapentin 300 milliGRAM(s) Oral every 8 hours  lacosamide 150 milliGRAM(s) Oral two times a day  multivitamin 1 Tablet(s) Oral daily  polyethylene glycol 3350 17 Gram(s) Oral daily  senna 2 Tablet(s) Oral at bedtime  tamsulosin 0.4 milliGRAM(s) Oral at bedtime      PHYSICAL EXAM:  T(C): 36.7 (04-09-24 @ 09:00), Max: 37.1 (04-09-24 @ 00:12)  HR: 71 (04-09-24 @ 09:00) (71 - 79)  BP: 108/68 (04-09-24 @ 09:00) (106/69 - 118/74)  RR: 18 (04-09-24 @ 09:00) (18 - 18)  SpO2: 98% (04-09-24 @ 09:00) (96% - 99%)  Wt(kg): --  I&O's Summary    08 Apr 2024 07:01  -  09 Apr 2024 07:00  --------------------------------------------------------  IN: 740 mL / OUT: 2525 mL / NET: -1785 mL    09 Apr 2024 07:01  -  09 Apr 2024 14:56  --------------------------------------------------------  IN: 0 mL / OUT: 705 mL / NET: -705 mL        Appearance: Awake, generalized weakness, lying down in bed 	  HEENT: Eyes are open   Lymphatic: No lymphadenopathy grossly   Cardiovascular: Normal    Respiratory: normal effort , clear  Gastrointestinal:  Soft, Non-tender  Skin: No rashes,  warm to touch  Psychiatry:  Mood & affect appropriate  Musculoskeletal: LLE wrapped in ACE Wrap, area of ecchymosis, ASHLEY drains with bloody output         04-08-24 @ 07:01  -  04-09-24 @ 07:00  --------------------------------------------------------  IN: 740 mL / OUT: 2525 mL / NET: -1785 mL    04-09-24 @ 07:01  -  04-09-24 @ 14:56  --------------------------------------------------------  IN: 0 mL / OUT: 705 mL / NET: -705 mL                                  7.3    9.68  )-----------( 225      ( 09 Apr 2024 06:59 )             22.8               04-09    140  |  107  |  16  ----------------------------<  91  4.3   |  22  |  0.90    Ca    7.9<L>      09 Apr 2024 06:59  Phos  3.3     04-09  Mg     2.3     04-09    TPro  5.6<L>  /  Alb  2.9<L>  /  TBili  0.4  /  DBili  x   /  AST  12  /  ALT  15  /  AlkPhos  56  04-08    PT/INR - ( 08 Apr 2024 07:16 )   PT: 9.4 sec;   INR: 0.89 ratio         PTT - ( 08 Apr 2024 07:16 )  PTT:26.3 sec                   Urinalysis Basic - ( 09 Apr 2024 06:59 )    Color: x / Appearance: x / SG: x / pH: x  Gluc: 91 mg/dL / Ketone: x  / Bili: x / Urobili: x   Blood: x / Protein: x / Nitrite: x   Leuk Esterase: x / RBC: x / WBC x   Sq Epi: x / Non Sq Epi: x / Bacteria: x

## 2024-04-09 NOTE — PROGRESS NOTE ADULT - SUBJECTIVE AND OBJECTIVE BOX
Vascular Surgery Progress Note     Subjective:  Patient seen and examined. Patient endorses some LLE pain but states it is tolerable.     Vital Signs:  Vital Signs Last 24 Hrs  T(C): 36.5 (09 Apr 2024 15:34), Max: 37.1 (09 Apr 2024 00:12)  T(F): 97.7 (09 Apr 2024 15:34), Max: 98.8 (09 Apr 2024 00:12)  HR: 76 (09 Apr 2024 15:34) (71 - 79)  BP: 108/71 (09 Apr 2024 15:34) (106/69 - 108/71)  RR: 18 (09 Apr 2024 15:34) (18 - 18)  SpO2: 99% (09 Apr 2024 15:34) (96% - 99%)    Parameters below as of 09 Apr 2024 15:34  Patient On (Oxygen Delivery Method): room air        CAPILLARY BLOOD GLUCOSE          I&O's Detail    08 Apr 2024 07:01  -  09 Apr 2024 07:00  --------------------------------------------------------  IN:    Oral Fluid: 740 mL  Total IN: 740 mL    OUT:    Bulb (mL): 20 mL    Bulb (mL): 5 mL    Voided (mL): 2500 mL  Total OUT: 2525 mL    Total NET: -1785 mL      09 Apr 2024 07:01  -  09 Apr 2024 19:50  --------------------------------------------------------  IN:  Total IN: 0 mL    OUT:    Bulb (mL): 5 mL    Voided (mL): 900 mL  Total OUT: 905 mL    Total NET: -905 mL            Physical Exam:  General: Not acutely distressed  Respiratory: nonlabored respirations  CV: pulse present   Extremities: LLE medial thigh with superficial skin surrounding incision covered by adaptic, dressing strikethrough, ASHLEY drain x2 with s/s drainage        Labs:    04-09    140  |  107  |  16  ----------------------------<  91  4.3   |  22  |  0.90    Ca    7.9<L>      09 Apr 2024 06:59  Phos  3.3     04-09  Mg     2.3     04-09    TPro  5.6<L>  /  Alb  2.9<L>  /  TBili  0.4  /  DBili  x   /  AST  12  /  ALT  15  /  AlkPhos  56  04-08    LIVER FUNCTIONS - ( 08 Apr 2024 07:16 )  Alb: 2.9 g/dL / Pro: 5.6 g/dL / ALK PHOS: 56 U/L / ALT: 15 U/L / AST: 12 U/L / GGT: x                                 7.3    9.68  )-----------( 225      ( 09 Apr 2024 06:59 )             22.8     PT/INR - ( 08 Apr 2024 07:16 )   PT: 9.4 sec;   INR: 0.89 ratio         PTT - ( 08 Apr 2024 07:16 )  PTT:26.3 sec

## 2024-04-09 NOTE — PROGRESS NOTE ADULT - SUBJECTIVE AND OBJECTIVE BOX
Patient is a 53y old  Male who presents with a chief complaint of Hematoma (09 Apr 2024 14:56)    Patient is seen and examined at the bedside.   Appears comfortable and no new complaints.       MEDICATIONS  (STANDING):  aspirin  chewable 81 milliGRAM(s) Oral daily  atorvastatin 20 milliGRAM(s) Oral at bedtime  enoxaparin Injectable 40 milliGRAM(s) SubCutaneous every 24 hours  gabapentin 300 milliGRAM(s) Oral every 8 hours  lacosamide 150 milliGRAM(s) Oral two times a day  multivitamin 1 Tablet(s) Oral daily  polyethylene glycol 3350 17 Gram(s) Oral daily  senna 2 Tablet(s) Oral at bedtime  tamsulosin 0.4 milliGRAM(s) Oral at bedtime    MEDICATIONS  (PRN):  bacitracin   Ointment 1 Application(s) Topical daily PRN daily dressing change  HYDROmorphone  Injectable 0.25 milliGRAM(s) IV Push four times a day PRN Moderate Pain (4 - 6)  HYDROmorphone  Injectable 0.5 milliGRAM(s) IV Push every 4 hours PRN Severe Pain (7 - 10)      Vital Signs Last 24 Hrs  T(C): 36.7 (09 Apr 2024 09:00), Max: 37.1 (09 Apr 2024 00:12)  T(F): 98 (09 Apr 2024 09:00), Max: 98.8 (09 Apr 2024 00:12)  HR: 71 (09 Apr 2024 09:00) (71 - 79)  BP: 108/68 (09 Apr 2024 09:00) (106/69 - 118/74)  BP(mean): --  RR: 18 (09 Apr 2024 09:00) (18 - 18)  SpO2: 98% (09 Apr 2024 09:00) (96% - 99%)    Parameters below as of 09 Apr 2024 09:00  Patient On (Oxygen Delivery Method): room air        PE  NAD  Awake, alert  Anicteric, MMM  RRR  CTAB  Abd soft, NT, ND  No c/c                      7.3    9.68  )-----------( 225      ( 09 Apr 2024 06:59 )             22.8       04-09    140  |  107  |  16  ----------------------------<  91  4.3   |  22  |  0.90    Ca    7.9<L>      09 Apr 2024 06:59  Phos  3.3     04-09  Mg     2.3     04-09    TPro  5.6<L>  /  Alb  2.9<L>  /  TBili  0.4  /  DBili  x   /  AST  12  /  ALT  15  /  AlkPhos  56  04-08

## 2024-04-10 LAB
ALBUMIN SERPL ELPH-MCNC: 3.4 G/DL — SIGNIFICANT CHANGE UP (ref 3.3–5)
ALP SERPL-CCNC: 77 U/L — SIGNIFICANT CHANGE UP (ref 40–120)
ALT FLD-CCNC: 20 U/L — SIGNIFICANT CHANGE UP (ref 10–45)
ANION GAP SERPL CALC-SCNC: 10 MMOL/L — SIGNIFICANT CHANGE UP (ref 5–17)
AST SERPL-CCNC: 15 U/L — SIGNIFICANT CHANGE UP (ref 10–40)
BASOPHILS # BLD AUTO: 0.05 K/UL — SIGNIFICANT CHANGE UP (ref 0–0.2)
BASOPHILS NFR BLD AUTO: 0.5 % — SIGNIFICANT CHANGE UP (ref 0–2)
BILIRUB SERPL-MCNC: 0.6 MG/DL — SIGNIFICANT CHANGE UP (ref 0.2–1.2)
BUN SERPL-MCNC: 16 MG/DL — SIGNIFICANT CHANGE UP (ref 7–23)
CALCIUM SERPL-MCNC: 8.7 MG/DL — SIGNIFICANT CHANGE UP (ref 8.4–10.5)
CHLORIDE SERPL-SCNC: 103 MMOL/L — SIGNIFICANT CHANGE UP (ref 96–108)
CO2 SERPL-SCNC: 25 MMOL/L — SIGNIFICANT CHANGE UP (ref 22–31)
CREAT SERPL-MCNC: 0.97 MG/DL — SIGNIFICANT CHANGE UP (ref 0.5–1.3)
EGFR: 93 ML/MIN/1.73M2 — SIGNIFICANT CHANGE UP
EOSINOPHIL # BLD AUTO: 0.49 K/UL — SIGNIFICANT CHANGE UP (ref 0–0.5)
EOSINOPHIL NFR BLD AUTO: 4.9 % — SIGNIFICANT CHANGE UP (ref 0–6)
GLUCOSE SERPL-MCNC: 101 MG/DL — HIGH (ref 70–99)
HCT VFR BLD CALC: 24.2 % — LOW (ref 39–50)
HCT VFR BLD CALC: 24.5 % — LOW (ref 39–50)
HGB BLD-MCNC: 8 G/DL — LOW (ref 13–17)
HGB BLD-MCNC: 8.1 G/DL — LOW (ref 13–17)
IMM GRANULOCYTES NFR BLD AUTO: 2.1 % — HIGH (ref 0–0.9)
LYMPHOCYTES # BLD AUTO: 1.35 K/UL — SIGNIFICANT CHANGE UP (ref 1–3.3)
LYMPHOCYTES # BLD AUTO: 13.4 % — SIGNIFICANT CHANGE UP (ref 13–44)
MAGNESIUM SERPL-MCNC: 2.2 MG/DL — SIGNIFICANT CHANGE UP (ref 1.6–2.6)
MCHC RBC-ENTMCNC: 29.7 PG — SIGNIFICANT CHANGE UP (ref 27–34)
MCHC RBC-ENTMCNC: 29.9 PG — SIGNIFICANT CHANGE UP (ref 27–34)
MCHC RBC-ENTMCNC: 32.7 GM/DL — SIGNIFICANT CHANGE UP (ref 32–36)
MCHC RBC-ENTMCNC: 33.5 GM/DL — SIGNIFICANT CHANGE UP (ref 32–36)
MCV RBC AUTO: 89.3 FL — SIGNIFICANT CHANGE UP (ref 80–100)
MCV RBC AUTO: 91.1 FL — SIGNIFICANT CHANGE UP (ref 80–100)
MONOCYTES # BLD AUTO: 1.17 K/UL — HIGH (ref 0–0.9)
MONOCYTES NFR BLD AUTO: 11.6 % — SIGNIFICANT CHANGE UP (ref 2–14)
NEUTROPHILS # BLD AUTO: 6.83 K/UL — SIGNIFICANT CHANGE UP (ref 1.8–7.4)
NEUTROPHILS NFR BLD AUTO: 67.5 % — SIGNIFICANT CHANGE UP (ref 43–77)
NRBC # BLD: 0 /100 WBCS — SIGNIFICANT CHANGE UP (ref 0–0)
NRBC # BLD: 0 /100 WBCS — SIGNIFICANT CHANGE UP (ref 0–0)
PHOSPHATE SERPL-MCNC: 3.8 MG/DL — SIGNIFICANT CHANGE UP (ref 2.5–4.5)
PLATELET # BLD AUTO: 271 K/UL — SIGNIFICANT CHANGE UP (ref 150–400)
PLATELET # BLD AUTO: 288 K/UL — SIGNIFICANT CHANGE UP (ref 150–400)
POTASSIUM SERPL-MCNC: 4.1 MMOL/L — SIGNIFICANT CHANGE UP (ref 3.5–5.3)
POTASSIUM SERPL-SCNC: 4.1 MMOL/L — SIGNIFICANT CHANGE UP (ref 3.5–5.3)
PROT SERPL-MCNC: 6.6 G/DL — SIGNIFICANT CHANGE UP (ref 6–8.3)
RBC # BLD: 2.69 M/UL — LOW (ref 4.2–5.8)
RBC # BLD: 2.71 M/UL — LOW (ref 4.2–5.8)
RBC # FLD: 14.2 % — SIGNIFICANT CHANGE UP (ref 10.3–14.5)
RBC # FLD: 14.3 % — SIGNIFICANT CHANGE UP (ref 10.3–14.5)
SODIUM SERPL-SCNC: 138 MMOL/L — SIGNIFICANT CHANGE UP (ref 135–145)
WBC # BLD: 10.1 K/UL — SIGNIFICANT CHANGE UP (ref 3.8–10.5)
WBC # BLD: 9.21 K/UL — SIGNIFICANT CHANGE UP (ref 3.8–10.5)
WBC # FLD AUTO: 10.1 K/UL — SIGNIFICANT CHANGE UP (ref 3.8–10.5)
WBC # FLD AUTO: 9.21 K/UL — SIGNIFICANT CHANGE UP (ref 3.8–10.5)

## 2024-04-10 RX ORDER — ENOXAPARIN SODIUM 100 MG/ML
40 INJECTION SUBCUTANEOUS EVERY 12 HOURS
Refills: 0 | Status: DISCONTINUED | OUTPATIENT
Start: 2024-04-10 | End: 2024-04-13

## 2024-04-10 RX ADMIN — LACOSAMIDE 150 MILLIGRAM(S): 50 TABLET ORAL at 17:12

## 2024-04-10 RX ADMIN — SENNA PLUS 2 TABLET(S): 8.6 TABLET ORAL at 21:22

## 2024-04-10 RX ADMIN — GABAPENTIN 300 MILLIGRAM(S): 400 CAPSULE ORAL at 05:27

## 2024-04-10 RX ADMIN — GABAPENTIN 300 MILLIGRAM(S): 400 CAPSULE ORAL at 21:22

## 2024-04-10 RX ADMIN — POLYETHYLENE GLYCOL 3350 17 GRAM(S): 17 POWDER, FOR SOLUTION ORAL at 12:31

## 2024-04-10 RX ADMIN — LACOSAMIDE 150 MILLIGRAM(S): 50 TABLET ORAL at 05:29

## 2024-04-10 RX ADMIN — Medication 81 MILLIGRAM(S): at 12:34

## 2024-04-10 RX ADMIN — ENOXAPARIN SODIUM 40 MILLIGRAM(S): 100 INJECTION SUBCUTANEOUS at 17:12

## 2024-04-10 RX ADMIN — HYDROMORPHONE HYDROCHLORIDE 0.5 MILLIGRAM(S): 2 INJECTION INTRAMUSCULAR; INTRAVENOUS; SUBCUTANEOUS at 04:30

## 2024-04-10 RX ADMIN — HYDROMORPHONE HYDROCHLORIDE 0.5 MILLIGRAM(S): 2 INJECTION INTRAMUSCULAR; INTRAVENOUS; SUBCUTANEOUS at 03:27

## 2024-04-10 RX ADMIN — HYDROMORPHONE HYDROCHLORIDE 0.5 MILLIGRAM(S): 2 INJECTION INTRAMUSCULAR; INTRAVENOUS; SUBCUTANEOUS at 21:22

## 2024-04-10 RX ADMIN — HYDROMORPHONE HYDROCHLORIDE 0.5 MILLIGRAM(S): 2 INJECTION INTRAMUSCULAR; INTRAVENOUS; SUBCUTANEOUS at 21:55

## 2024-04-10 RX ADMIN — GABAPENTIN 300 MILLIGRAM(S): 400 CAPSULE ORAL at 17:12

## 2024-04-10 RX ADMIN — ATORVASTATIN CALCIUM 20 MILLIGRAM(S): 80 TABLET, FILM COATED ORAL at 21:22

## 2024-04-10 RX ADMIN — TAMSULOSIN HYDROCHLORIDE 0.4 MILLIGRAM(S): 0.4 CAPSULE ORAL at 21:22

## 2024-04-10 RX ADMIN — Medication 1 TABLET(S): at 12:33

## 2024-04-10 NOTE — PROGRESS NOTE ADULT - ASSESSMENT
53y Male with pmhx CVA abd Hx of DVT's    Anemia  - patient anemic (Hb baseline 13-14) 2/2 hematoma; S/P PRBC 2U in ED.    - follow up IR and vascular recs  - transfuse for Hb>7  - monitor cbc Q12    DVTs  - hx of bilateral DVTs (noted to resolve 1/24)  - hold lovenox and AC for now due to large hematoma  - pt last seen Dr Varela on 12/2023  - can cont with ppx lovenox at 40mg daily   - monitor for now    MSK  - patient's LLE swollen with concern for compartment syndrome  - Interval increase in size ( 61-->71cm)   - f/u ortho and vascular recs for management for possible fasciotomy   - pain control per primary team  - s/p evacuation of hematoma in OR 4/5  - overall improved condition  - continue ongoing need for acute inpatient rehab    will follow    Meenakshi Diaz NP  Hematology/Oncology  [facility]  [phone] (office)

## 2024-04-10 NOTE — PROGRESS NOTE ADULT - SUBJECTIVE AND OBJECTIVE BOX
Name of Patient : TAMI DUNHAM  MRN: [id number]  Date of visit: 04-10-24 @ 14:35      Subjective: Patient seen and examined. No new events except as noted.   Patient seen earlier this AM. Lying down in bed. Reports pain is controlled.   Continues on ASA and Lovenox PPX dose. Hgb stable.     REVIEW OF SYSTEMS:    CONSTITUTIONAL: Generalized weakness; Pain controlled   EYES/ENT: No visual changes;  No vertigo or throat pain   NECK: No pain or stiffness  RESPIRATORY: No cough, wheezing, hemoptysis; No shortness of breath  CARDIOVASCULAR: No chest pain or palpitations  GASTROINTESTINAL: No abdominal or epigastric pain. No nausea, vomiting, or hematemesis; No diarrhea or constipation. No melena or hematochezia.  GENITOURINARY: No dysuria, frequency or hematuria  NEUROLOGICAL: No numbness or weakness  SKIN: LLE wrapped in ACE Wrap; ASHLEY drains in place; Ecchymosis; Pain controlled   All other review of systems is negative unless indicated above.    MEDICATIONS:  MEDICATIONS  (STANDING):  aspirin  chewable 81 milliGRAM(s) Oral daily  atorvastatin 20 milliGRAM(s) Oral at bedtime  enoxaparin Injectable 40 milliGRAM(s) SubCutaneous every 24 hours  gabapentin 300 milliGRAM(s) Oral every 8 hours  lacosamide 150 milliGRAM(s) Oral two times a day  multivitamin 1 Tablet(s) Oral daily  polyethylene glycol 3350 17 Gram(s) Oral daily  senna 2 Tablet(s) Oral at bedtime  tamsulosin 0.4 milliGRAM(s) Oral at bedtime      PHYSICAL EXAM:  T(C): 36.9 (04-10-24 @ 08:15), Max: 37.1 (04-09-24 @ 20:10)  HR: 81 (04-10-24 @ 08:15) (75 - 81)  BP: 106/69 (04-10-24 @ 08:15) (104/68 - 118/67)  RR: 18 (04-10-24 @ 08:15) (17 - 18)  SpO2: 98% (04-10-24 @ 08:15) (97% - 99%)  Wt(kg): --  I&O's Summary    09 Apr 2024 07:01  -  10 Apr 2024 07:00  --------------------------------------------------------  IN: 0 mL / OUT: 2760 mL / NET: -2760 mL            Appearance: Awake, generalized weakness, lying down in bed 	  HEENT: Eyes are open   Lymphatic: No lymphadenopathy grossly   Cardiovascular: Normal    Respiratory: normal effort , clear  Gastrointestinal:  Soft, Non-tender  Skin: No rashes,  warm to touch  Psychiatry:  Mood & affect appropriate  Musculoskeletal: LLE wrapped in ACE Wrap, area of ecchymosis, ASHLEY drain with output         04-09-24 @ 07:01  -  04-10-24 @ 07:00  --------------------------------------------------------  IN: 0 mL / OUT: 2760 mL / NET: -2760 mL                                  8.0    10.10 )-----------( 271      ( 10 Apr 2024 06:57 )             24.5               04-10    138  |  103  |  16  ----------------------------<  101<H>  4.1   |  25  |  0.97    Ca    8.7      10 Apr 2024 06:57  Phos  3.8     04-10  Mg     2.2     04-10    TPro  6.6  /  Alb  3.4  /  TBili  0.6  /  DBili  x   /  AST  15  /  ALT  20  /  AlkPhos  77  04-10                       Urinalysis Basic - ( 10 Apr 2024 06:57 )    Color: x / Appearance: x / SG: x / pH: x  Gluc: 101 mg/dL / Ketone: x  / Bili: x / Urobili: x   Blood: x / Protein: x / Nitrite: x   Leuk Esterase: x / RBC: x / WBC x   Sq Epi: x / Non Sq Epi: x / Bacteria: x

## 2024-04-10 NOTE — PROGRESS NOTE ADULT - ASSESSMENT
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 On admission, vitals significant for hypotension and labs showing anemia with CTA showing large hematoma of the LLE and small RLE hematoma with repeat CTA showing interval increase of the hematoma and new small LLE hematoma s/p 2units pRBCs. He is now admitted to the MICU for further management.       # LE Hematoma, Anemia   - 4/4 CT A w/ Mod- large SubC hematoma in the anteromedial L upper thigh containing scattered foci of high attenuation concerning for active hemorrhage within the hematoma; Small collection in the anterior upper right thigh suspicious for a hematoma. Mild subcutaneous inflammatory change in the visualized anterior and lateral upper right thigh with associated foci of subcutaneous gas.   - 4/5 CT w/ Interval increase in size of an anteromedial L upper thigh subc  hematoma with scattered foci of high attenuation within the hematoma compatible with active hemorrhage; New left gracilis intramuscular hematoma which could be extending from the subcutaneous hematoma.  - Duplex negative for DVT   - S/P Hematoma evacuation with Vascular on 4/5. Monitor drain output --> Superior drain DC'd   - S/P PRBCs  X 4   - C/w ASA and Lovenox PPX dose. Monitor H/H   - Pulse checks per protocol;  Monitor LE closely  - Serial CBC, Maintain active T/S, transfuse for Hgb < 7.0  - IR, Vascular, MICU care appreciated   - Per vascular surgery    #leukocytosis  - noted to have elevated WBC  - likely inflammatory iso blood loss with low concern for infection  - trend WBC and fever curve  - If febrile, check pan cultures    #Seizure disorder  # CVA with residual expressive aphasia  - C/w home AEDs (lacosamide and vyanese   - C/w home gabapentin   - Seizure precautions  - Neuro eval appreciated; F/u recs     #HLD  - C/w home Lipitor    #BPH  - C/w flomax    #DVTs  - hx of bilateral DVTs --> Repeat duplex negative   - ASA and DVT PPX resumed       Discussed with Attending, Neuro and ACP.

## 2024-04-10 NOTE — PROGRESS NOTE ADULT - SUBJECTIVE AND OBJECTIVE BOX
Patient seen and examined at bedside.   seen at bedside. pt feels overall ok. some leg pain     MEDICATIONS  (STANDING):  aspirin  chewable 81 milliGRAM(s) Oral daily  atorvastatin 20 milliGRAM(s) Oral at bedtime  enoxaparin Injectable 40 milliGRAM(s) SubCutaneous every 24 hours  gabapentin 300 milliGRAM(s) Oral every 8 hours  lacosamide 150 milliGRAM(s) Oral two times a day  multivitamin 1 Tablet(s) Oral daily  polyethylene glycol 3350 17 Gram(s) Oral daily  senna 2 Tablet(s) Oral at bedtime  tamsulosin 0.4 milliGRAM(s) Oral at bedtime    MEDICATIONS  (PRN):  bacitracin   Ointment 1 Application(s) Topical daily PRN daily dressing change  HYDROmorphone  Injectable 0.25 milliGRAM(s) IV Push four times a day PRN Moderate Pain (4 - 6)  HYDROmorphone  Injectable 0.5 milliGRAM(s) IV Push every 4 hours PRN Severe Pain (7 - 10)        Vital Signs Last 24 Hrs  T(C): 36.9 (10 Apr 2024 08:15), Max: 37.1 (09 Apr 2024 20:10)  T(F): 98.5 (10 Apr 2024 08:15), Max: 98.7 (09 Apr 2024 20:10)  HR: 81 (10 Apr 2024 08:15) (75 - 81)  BP: 106/69 (10 Apr 2024 08:15) (104/68 - 118/67)  BP(mean): --  RR: 18 (10 Apr 2024 08:15) (17 - 18)  SpO2: 98% (10 Apr 2024 08:15) (97% - 99%)    Parameters below as of 10 Apr 2024 08:15  Patient On (Oxygen Delivery Method): room air          PHYSICAL EXAM:     GENERAL:  Appears stated age, well-groomed  HEENT:  NC/AT,  conjunctivae clear and pink  CHEST:  CTA b/l  HEART:  S1 s2+   ABDOMEN:  Soft, non-tender, non-distended   NEURO:  Alert, oriented, no asterixis                            8.0    10.10 )-----------( 271      ( 10 Apr 2024 06:57 )             24.5       04-10    138  |  103  |  16  ----------------------------<  101<H>  4.1   |  25  |  0.97    Ca    8.7      10 Apr 2024 06:57  Phos  3.8     04-10  Mg     2.2     04-10    TPro  6.6  /  Alb  3.4  /  TBili  0.6  /  DBili  x   /  AST  15  /  ALT  20  /  AlkPhos  77  04-10

## 2024-04-11 ENCOUNTER — TRANSCRIPTION ENCOUNTER (OUTPATIENT)
Age: 54
End: 2024-04-11

## 2024-04-11 LAB
BLD GP AB SCN SERPL QL: NEGATIVE — SIGNIFICANT CHANGE UP
HCT VFR BLD CALC: 25 % — LOW (ref 39–50)
HGB BLD-MCNC: 8.3 G/DL — LOW (ref 13–17)
MCHC RBC-ENTMCNC: 30.1 PG — SIGNIFICANT CHANGE UP (ref 27–34)
MCHC RBC-ENTMCNC: 33.2 GM/DL — SIGNIFICANT CHANGE UP (ref 32–36)
MCV RBC AUTO: 90.6 FL — SIGNIFICANT CHANGE UP (ref 80–100)
NRBC # BLD: 0 /100 WBCS — SIGNIFICANT CHANGE UP (ref 0–0)
PLATELET # BLD AUTO: 299 K/UL — SIGNIFICANT CHANGE UP (ref 150–400)
RBC # BLD: 2.76 M/UL — LOW (ref 4.2–5.8)
RBC # FLD: 14.1 % — SIGNIFICANT CHANGE UP (ref 10.3–14.5)
RH IG SCN BLD-IMP: NEGATIVE — SIGNIFICANT CHANGE UP
WBC # BLD: 7.85 K/UL — SIGNIFICANT CHANGE UP (ref 3.8–10.5)
WBC # FLD AUTO: 7.85 K/UL — SIGNIFICANT CHANGE UP (ref 3.8–10.5)

## 2024-04-11 PROCEDURE — 99231 SBSQ HOSP IP/OBS SF/LOW 25: CPT

## 2024-04-11 RX ORDER — HYDROMORPHONE HYDROCHLORIDE 2 MG/ML
0.25 INJECTION INTRAMUSCULAR; INTRAVENOUS; SUBCUTANEOUS
Refills: 0 | Status: DISCONTINUED | OUTPATIENT
Start: 2024-04-11 | End: 2024-04-16

## 2024-04-11 RX ORDER — HYDROMORPHONE HYDROCHLORIDE 2 MG/ML
0.5 INJECTION INTRAMUSCULAR; INTRAVENOUS; SUBCUTANEOUS EVERY 4 HOURS
Refills: 0 | Status: DISCONTINUED | OUTPATIENT
Start: 2024-04-11 | End: 2024-04-16

## 2024-04-11 RX ADMIN — ENOXAPARIN SODIUM 40 MILLIGRAM(S): 100 INJECTION SUBCUTANEOUS at 17:08

## 2024-04-11 RX ADMIN — HYDROMORPHONE HYDROCHLORIDE 0.25 MILLIGRAM(S): 2 INJECTION INTRAMUSCULAR; INTRAVENOUS; SUBCUTANEOUS at 06:22

## 2024-04-11 RX ADMIN — GABAPENTIN 300 MILLIGRAM(S): 400 CAPSULE ORAL at 13:19

## 2024-04-11 RX ADMIN — Medication 1 APPLICATION(S): at 05:33

## 2024-04-11 RX ADMIN — ATORVASTATIN CALCIUM 20 MILLIGRAM(S): 80 TABLET, FILM COATED ORAL at 21:39

## 2024-04-11 RX ADMIN — LACOSAMIDE 150 MILLIGRAM(S): 50 TABLET ORAL at 17:07

## 2024-04-11 RX ADMIN — POLYETHYLENE GLYCOL 3350 17 GRAM(S): 17 POWDER, FOR SOLUTION ORAL at 13:18

## 2024-04-11 RX ADMIN — ENOXAPARIN SODIUM 40 MILLIGRAM(S): 100 INJECTION SUBCUTANEOUS at 05:33

## 2024-04-11 RX ADMIN — GABAPENTIN 300 MILLIGRAM(S): 400 CAPSULE ORAL at 05:33

## 2024-04-11 RX ADMIN — LACOSAMIDE 150 MILLIGRAM(S): 50 TABLET ORAL at 05:33

## 2024-04-11 RX ADMIN — SENNA PLUS 2 TABLET(S): 8.6 TABLET ORAL at 21:39

## 2024-04-11 RX ADMIN — GABAPENTIN 300 MILLIGRAM(S): 400 CAPSULE ORAL at 21:39

## 2024-04-11 RX ADMIN — Medication 81 MILLIGRAM(S): at 13:19

## 2024-04-11 RX ADMIN — TAMSULOSIN HYDROCHLORIDE 0.4 MILLIGRAM(S): 0.4 CAPSULE ORAL at 21:38

## 2024-04-11 RX ADMIN — Medication 1 TABLET(S): at 13:18

## 2024-04-11 NOTE — PROGRESS NOTE ADULT - SUBJECTIVE AND OBJECTIVE BOX
Subjective: Pt seen and examined at bedside with surgical team.    Vital Signs Last 24 Hrs  T(C): 36.7 (11 Apr 2024 00:30), Max: 36.9 (10 Apr 2024 08:15)  T(F): 98.1 (11 Apr 2024 00:30), Max: 98.5 (10 Apr 2024 08:15)  HR: 91 (11 Apr 2024 00:30) (75 - 91)  BP: 115/70 (11 Apr 2024 00:30) (106/69 - 115/70)  BP(mean): --  RR: 18 (11 Apr 2024 00:30) (18 - 18)  SpO2: 98% (11 Apr 2024 00:30) (98% - 99%)    Parameters below as of 11 Apr 2024 00:30  Patient On (Oxygen Delivery Method): room air        I&O's Detail    10 Apr 2024 07:01  -  11 Apr 2024 07:00  --------------------------------------------------------  IN:  Total IN: 0 mL    OUT:    Bulb (mL): 25 mL    Voided (mL): 1700 mL  Total OUT: 1725 mL    Total NET: -1725 mL      MEDICATIONS  (STANDING):  aspirin  chewable 81 milliGRAM(s) Oral daily  atorvastatin 20 milliGRAM(s) Oral at bedtime  enoxaparin Injectable 40 milliGRAM(s) SubCutaneous every 12 hours  gabapentin 300 milliGRAM(s) Oral every 8 hours  lacosamide 150 milliGRAM(s) Oral two times a day  multivitamin 1 Tablet(s) Oral daily  polyethylene glycol 3350 17 Gram(s) Oral daily  senna 2 Tablet(s) Oral at bedtime  tamsulosin 0.4 milliGRAM(s) Oral at bedtime    MEDICATIONS  (PRN):  bacitracin   Ointment 1 Application(s) Topical daily PRN daily dressing change  HYDROmorphone  Injectable 0.25 milliGRAM(s) IV Push four times a day PRN Moderate Pain (4 - 6)  HYDROmorphone  Injectable 0.5 milliGRAM(s) IV Push every 4 hours PRN Severe Pain (7 - 10)      LABS:                        8.1    9.21  )-----------( 288      ( 10 Apr 2024 20:05 )             24.2     04-10    138  |  103  |  16  ----------------------------<  101<H>  4.1   |  25  |  0.97    Ca    8.7      10 Apr 2024 06:57  Phos  3.8     04-10  Mg     2.2     04-10    TPro  6.6  /  Alb  3.4  /  TBili  0.6  /  DBili  x   /  AST  15  /  ALT  20  /  AlkPhos  77  04-10      LIVER FUNCTIONS - ( 10 Apr 2024 06:57 )  Alb: 3.4 g/dL / Pro: 6.6 g/dL / ALK PHOS: 77 U/L / ALT: 20 U/L / AST: 15 U/L / GGT: x           Urinalysis Basic - ( 10 Apr 2024 06:57 )    Color: x / Appearance: x / SG: x / pH: x  Gluc: 101 mg/dL / Ketone: x  / Bili: x / Urobili: x   Blood: x / Protein: x / Nitrite: x   Leuk Esterase: x / RBC: x / WBC x   Sq Epi: x / Non Sq Epi: x / Bacteria: x        Physical Exam:  General: Not acutely distressed  Respiratory: nonlabored respirations  CV: pulse present   Extremities: LLE medial thigh with superficial skin surrounding incision covered by adaptic, dressing strikethrough, ASHLEY drain x2 with s/s drainage 25 cc/24 hrs

## 2024-04-11 NOTE — PROGRESS NOTE ADULT - SUBJECTIVE AND OBJECTIVE BOX
Neurology        S: patient seen. doing well s/p hematoma evacuation. doing okay       Medications: MEDICATIONS  (STANDING):  aspirin  chewable 81 milliGRAM(s) Oral daily  atorvastatin 20 milliGRAM(s) Oral at bedtime  enoxaparin Injectable 40 milliGRAM(s) SubCutaneous every 12 hours  gabapentin 300 milliGRAM(s) Oral every 8 hours  lacosamide 150 milliGRAM(s) Oral two times a day  multivitamin 1 Tablet(s) Oral daily  polyethylene glycol 3350 17 Gram(s) Oral daily  senna 2 Tablet(s) Oral at bedtime  tamsulosin 0.4 milliGRAM(s) Oral at bedtime    MEDICATIONS  (PRN):  bacitracin   Ointment 1 Application(s) Topical daily PRN daily dressing change  HYDROmorphone  Injectable 0.5 milliGRAM(s) IV Push every 4 hours PRN Severe Pain (7 - 10)  HYDROmorphone  Injectable 0.25 milliGRAM(s) IV Push four times a day PRN Moderate Pain (4 - 6)       Vitals:  Vital Signs Last 24 Hrs  T(C): 36.7 (11 Apr 2024 00:30), Max: 36.9 (10 Apr 2024 15:35)  T(F): 98.1 (11 Apr 2024 00:30), Max: 98.4 (10 Apr 2024 15:35)  HR: 91 (11 Apr 2024 00:30) (75 - 91)  BP: 115/70 (11 Apr 2024 00:30) (108/72 - 115/70)  BP(mean): --  RR: 18 (11 Apr 2024 00:30) (18 - 18)  SpO2: 98% (11 Apr 2024 00:30) (98% - 99%)    Parameters below as of 11 Apr 2024 00:30  Patient On (Oxygen Delivery Method): room air                 General Exam:   General Appearance: Appropriately dressed and in no acute distress       Head: Normocephalic, atraumatic and no dysmorphic features  Ear, Nose, and Throat: Moist mucous membranes  CVS: S1S2+  Resp: No SOB, no wheeze or rhonchi  GI: soft NT/ND  Extremities: No edema or cyanosis L leg thigh hematoma   Skin: No bruises or rashes     Neurological Exam:  Mental Status: Awake, alert and oriented x 3 minimal WFD .  Able to follow simple and complex verbal commands. Able to name and repeat. fluent speech. No obvious aphasia or dysarthria noted.   Cranial Nerves: PERRL, EOMI, VFFC, sensation V1-V3 intact,  no obvious facial asymmetry, equal elevation of palate, scm/trap 5/5, tongue is midline on protrusion. no obvious papilledema on fundoscopic exam. hearing is grossly intact.   Motor: Normal bulk, tone and strength throughout. Fine finger movements were intact and symmetric. no tremors or drift noted.    Sensation: Intact to light touch and pinprick throughout. no right/left confusion. no extinction to tactile on DSS.  .   Reflexes: 1+ throughout at biceps, brachioradialis, triceps, patellars and ankles bilaterally and equal. No clonus. R toe and L toe were both downgoing.  Coordination: No dysmetria on FNF    Gait: deferred     Data/Labs/Imaging which I personally reviewed.          LABS:                          8.3    7.85  )-----------( 299      ( 11 Apr 2024 07:49 )             25.0     04-10    138  |  103  |  16  ----------------------------<  101<H>  4.1   |  25  |  0.97    Ca    8.7      10 Apr 2024 06:57  Phos  3.8     04-10  Mg     2.2     04-10    TPro  6.6  /  Alb  3.4  /  TBili  0.6  /  DBili  x   /  AST  15  /  ALT  20  /  AlkPhos  77  04-10    LIVER FUNCTIONS - ( 10 Apr 2024 06:57 )  Alb: 3.4 g/dL / Pro: 6.6 g/dL / ALK PHOS: 77 U/L / ALT: 20 U/L / AST: 15 U/L / GGT: x             Urinalysis Basic - ( 10 Apr 2024 06:57 )    Color: x / Appearance: x / SG: x / pH: x  Gluc: 101 mg/dL / Ketone: x  / Bili: x / Urobili: x   Blood: x / Protein: x / Nitrite: x   Leuk Esterase: x / RBC: x / WBC x   Sq Epi: x / Non Sq Epi: x / Bacteria: x

## 2024-04-11 NOTE — PROGRESS NOTE ADULT - ASSESSMENT
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 On admission, vitals significant for hypotension and labs showing anemia with CTA showing large hematoma of the LLE and small RLE hematoma with repeat CTA showing interval increase of the hematoma and new small LLE hematoma s/p 2units pRBCs. He is now admitted to the MICU for further management.       # LE Hematoma, Anemia   - 4/4 CT A w/ Mod- large SubC hematoma in the anteromedial L upper thigh containing scattered foci of high attenuation concerning for active hemorrhage within the hematoma; Small collection in the anterior upper right thigh suspicious for a hematoma. Mild subcutaneous inflammatory change in the visualized anterior and lateral upper right thigh with associated foci of subcutaneous gas.   - 4/5 CT w/ Interval increase in size of an anteromedial L upper thigh subc  hematoma with scattered foci of high attenuation within the hematoma compatible with active hemorrhage; New left gracilis intramuscular hematoma which could be extending from the subcutaneous hematoma.  - Duplex negative for DVT   - S/P Hematoma evacuation with Vascular on 4/5. Monitor drain output --> Superior drain DC'd   - S/P PRBCs  X 4   - C/w ASA and Lovenox 40 BID; Monitor H/H   - Pulse checks per protocol;  Monitor LE closely  - Serial CBC, Maintain active T/S, transfuse for Hgb < 7.0  - IR, Vascular, MICU care appreciated   - Per vascular surgery    #leukocytosis  - noted to have elevated WBC  - likely inflammatory iso blood loss with low concern for infection  - trend WBC and fever curve  - If febrile, check pan cultures    #Seizure disorder  # CVA with residual expressive aphasia  - C/w home AEDs (lacosamide and vyanese   - C/w home gabapentin   - Seizure precautions  - Neuro eval appreciated; F/u recs     #HLD  - C/w home Lipitor    #BPH  - C/w flomax    #DVTs  - hx of bilateral DVTs --> Repeat duplex negative   - ASA and DVT PPX resumed       Discussed with Attending, Neuro and ACP.

## 2024-04-11 NOTE — PROGRESS NOTE ADULT - ASSESSMENT
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 Smith presents with lower extremity pain. He was found to have a hematoma on the medial left thigh. The patient is hemodynamically stable with Hgb 12 g/dL and intact motor and sensory functions in the lower extremity. CTA demonstrating active subcutaneous and gracilis hematomas, large in size compared to CT performed four hours prior. S/p OR on 4/5 for LLE hematoma evacuation.    Recommendations:  - Daily dressing changes.  - Superior drain dc'd 4/10  - please document lower ASHLEY drainage  - Please call back with any questions.     Discussed with vascular surgery fellow on behalf of Dr. Christie.    Vascular Surgery  [phone]

## 2024-04-11 NOTE — PROGRESS NOTE ADULT - SUBJECTIVE AND OBJECTIVE BOX
pain controlled    REVIEW OF SYSTEMS  Constitutional - No fever,  No fatigue  HEENT - No vertigo, No neck pain  Neurological - No headaches, No memory loss  Psychiatric - No depression, No anxiety    FUNCTIONAL PROGRESS  PT 4/7  bed mobility CG  transfers min assist with RW  gait min assist with RW x 5 feet     OT 4/7  bed mobility min assist  transfers min assist with RW    VITALS  T(C): 36.7 (04-11-24 @ 00:30), Max: 36.9 (04-10-24 @ 15:35)  HR: 91 (04-11-24 @ 00:30) (75 - 91)  BP: 115/70 (04-11-24 @ 00:30) (108/72 - 115/70)  RR: 18 (04-11-24 @ 00:30) (18 - 18)  SpO2: 98% (04-11-24 @ 00:30) (98% - 99%)  Wt(kg): --    MEDICATIONS   aspirin  chewable 81 milliGRAM(s) daily  atorvastatin 20 milliGRAM(s) at bedtime  bacitracin   Ointment 1 Application(s) daily PRN  enoxaparin Injectable 40 milliGRAM(s) every 12 hours  gabapentin 300 milliGRAM(s) every 8 hours  HYDROmorphone  Injectable 0.25 milliGRAM(s) four times a day PRN  HYDROmorphone  Injectable 0.5 milliGRAM(s) every 4 hours PRN  lacosamide 150 milliGRAM(s) two times a day  multivitamin 1 Tablet(s) daily  polyethylene glycol 3350 17 Gram(s) daily  senna 2 Tablet(s) at bedtime  tamsulosin 0.4 milliGRAM(s) at bedtime      RECENT LABS - Reviewed                        8.3    7.85  )-----------( 299      ( 11 Apr 2024 07:49 )             25.0     04-10    138  |  103  |  16  ----------------------------<  101<H>  4.1   |  25  |  0.97    Ca    8.7      10 Apr 2024 06:57  Phos  3.8     04-10  Mg     2.2     04-10    TPro  6.6  /  Alb  3.4  /  TBili  0.6  /  DBili  x   /  AST  15  /  ALT  20  /  AlkPhos  77  04-10      Urinalysis Basic - ( 10 Apr 2024 06:57 )    Color: x / Appearance: x / SG: x / pH: x  Gluc: 101 mg/dL / Ketone: x  / Bili: x / Urobili: x   Blood: x / Protein: x / Nitrite: x   Leuk Esterase: x / RBC: x / WBC x   Sq Epi: x / Non Sq Epi: x / Bacteria: x      -----------------------------------------------------------------------------  PHYSICAL EXAM  Constitutional - NAD, Comfortable, in bed   Chest - Breathing comfortably room air   Cardiovascular - S1S2   Abdomen - Soft   Extremities -Left thigh dressing in place, +drain  Neurologic Exam -   follows commands                 Cognitive - Awake, Alert, AAO to self, place, date, year, situation     Communication - min word finding difficulty        Motor -                     LEFT    UE - ShAB 5/5, EF 5/5, EE 5/5, WE 5/5,  5/5                    RIGHT UE - ShAB 5/5, EF 5/5, EE 5/5, WE 5/5,  5/5                    LEFT    LE - DF 3/5, PF 3/5                    RIGHT LE - HF 5/5, KE 5/5, DF 5/5, PF 5/5        Sensory - Intact to LT     Psychiatric - Mood stable, Affect WNL  ----------------------------------------------------------------------------------------  ASSESSMENT/PLAN  53yMale h/o CVA, aphasia, seizures, DVT, left LE fasciotomy for compartment syndrome with functional deficits after left/right thigh hematoma, s/p hematoma evacuation, with debility   anemia, s/p transfusion, continue to monitor   seizures, on lacosamide  bowel regimen   Pain - Tylenol, dilaudid, gabapentin   DVT PPX - SCDs  Rehab - Will continue to follow for ongoing rehab needs and recommendations.   continue bedside therapy, needs PT/OT follow up  out of bed to chair isha    Recommend ACUTE inpatient rehabilitation for the functional deficits consisting of 3 hours of therapy/day x 2 weeks, 24 hour RN/daily PMR physician for comorbid medical management. Patient will be able to tolerate 3 hours a day.

## 2024-04-11 NOTE — PROGRESS NOTE ADULT - ASSESSMENT
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 Noticed within the last hour. No falls or trauma to the area. Administered Lovenox just prior to arrival. Denies fevers, chills, nausea, vomiting, urinary symptoms. No recent surgeries  found left thigh hematoma   s/p 2 units of PRBC   s/p hematoma evacuation 4/5, now doing well    Impression:   1) chronic strokes, resid minimal WFD  2) seizure d/o 2/2 strokes  3) ADHD  4) vertigo BPPV, stable   5) new L thigh hematoma   6) LLE compartment syndrome           - Lovenox full dose now held.  was injecting in thigh at site of hematoma ; last admission Lovenox started for DVT and changed to BID dosing   - for ADHD gets Vyvanse 50mg daily  - for seizure he is on vimpat 150 mg BID  - for secondary stroke prevention should be on asa 81mg daily and full dose lovenox . now held ; ASA restarted adn DVT ppx lovenox.  need to discuss with surgery and heme/onc next steps regarding AC   - statin therapy with LDL goal < 70mg/dL; atorvastatin 20 at home   - meclizline PRN   - PT/OT   - check FS, glucose control <180  - GI/DVT ppx   - Thank you for allowing me to participate in the care of this patient. Call with questions.   Braxton Forde MD  Vascular Neurology  Office: [phone]

## 2024-04-11 NOTE — PROGRESS NOTE ADULT - SUBJECTIVE AND OBJECTIVE BOX
Name of Patient : TAMI DUNHAM  MRN: [id number]  Date of visit: 04-11-24 @ 15:40      Subjective: Patient seen and examined. No new events except as noted.   Patient seen earlier this Am. Reports his pain is controlled. Hgb stable.   Lovenox increased to 40 BID.    REVIEW OF SYSTEMS:    CONSTITUTIONAL: Generalized weakness; Pain controlled   EYES/ENT: No visual changes;  No vertigo or throat pain   NECK: No pain or stiffness  RESPIRATORY: No cough, wheezing, hemoptysis; No shortness of breath  CARDIOVASCULAR: No chest pain or palpitations  GASTROINTESTINAL: No abdominal or epigastric pain. No nausea, vomiting, or hematemesis; No diarrhea or constipation. No melena or hematochezia.  GENITOURINARY: No dysuria, frequency or hematuria  NEUROLOGICAL: No numbness or weakness  SKIN: LLE wrapped in ACE Wrap; ASHLEY drain X 1 in place; Ecchymosis; Pain controlled    All other review of systems is negative unless indicated above.    MEDICATIONS:  MEDICATIONS  (STANDING):  aspirin  chewable 81 milliGRAM(s) Oral daily  atorvastatin 20 milliGRAM(s) Oral at bedtime  enoxaparin Injectable 40 milliGRAM(s) SubCutaneous every 12 hours  gabapentin 300 milliGRAM(s) Oral every 8 hours  lacosamide 150 milliGRAM(s) Oral two times a day  multivitamin 1 Tablet(s) Oral daily  polyethylene glycol 3350 17 Gram(s) Oral daily  senna 2 Tablet(s) Oral at bedtime  tamsulosin 0.4 milliGRAM(s) Oral at bedtime      PHYSICAL EXAM:  T(C): 37.3 (04-11-24 @ 10:00), Max: 37.3 (04-11-24 @ 10:00)  HR: 78 (04-11-24 @ 10:00) (78 - 91)  BP: 95/65 (04-11-24 @ 10:00) (95/65 - 115/70)  RR: 18 (04-11-24 @ 10:00) (18 - 18)  SpO2: 95% (04-11-24 @ 10:00) (95% - 98%)  Wt(kg): --  I&O's Summary    10 Apr 2024 07:01  -  11 Apr 2024 07:00  --------------------------------------------------------  IN: 0 mL / OUT: 1725 mL / NET: -1725 mL          Appearance: Awake, generalized weakness, lying down in bed 	  HEENT: Eyes are open   Lymphatic: No lymphadenopathy grossly   Cardiovascular: Normal    Respiratory: normal effort , clear  Gastrointestinal:  Soft, Non-tender  Skin: No rashes,  warm to touch  Psychiatry:  Mood & affect appropriate  Musculoskeletal: LLE wrapped in ACE Wrap, area of ecchymosis, ASHLEY drain with output         04-10-24 @ 07:01  -  04-11-24 @ 07:00  --------------------------------------------------------  IN: 0 mL / OUT: 1725 mL / NET: -1725 mL                                  8.3    7.85  )-----------( 299      ( 11 Apr 2024 07:49 )             25.0               04-10    138  |  103  |  16  ----------------------------<  101<H>  4.1   |  25  |  0.97    Ca    8.7      10 Apr 2024 06:57  Phos  3.8     04-10  Mg     2.2     04-10    TPro  6.6  /  Alb  3.4  /  TBili  0.6  /  DBili  x   /  AST  15  /  ALT  20  /  AlkPhos  77  04-10                       Urinalysis Basic - ( 10 Apr 2024 06:57 )    Color: x / Appearance: x / SG: x / pH: x  Gluc: 101 mg/dL / Ketone: x  / Bili: x / Urobili: x   Blood: x / Protein: x / Nitrite: x   Leuk Esterase: x / RBC: x / WBC x   Sq Epi: x / Non Sq Epi: x / Bacteria: x

## 2024-04-12 ENCOUNTER — APPOINTMENT (OUTPATIENT)
Dept: MRI IMAGING | Facility: IMAGING CENTER | Age: 54
End: 2024-04-12

## 2024-04-12 LAB
HCT VFR BLD CALC: 25.9 % — LOW (ref 39–50)
HGB BLD-MCNC: 8 G/DL — LOW (ref 13–17)
MCHC RBC-ENTMCNC: 28.5 PG — SIGNIFICANT CHANGE UP (ref 27–34)
MCHC RBC-ENTMCNC: 30.9 GM/DL — LOW (ref 32–36)
MCV RBC AUTO: 92.2 FL — SIGNIFICANT CHANGE UP (ref 80–100)
NRBC # BLD: 0 /100 WBCS — SIGNIFICANT CHANGE UP (ref 0–0)
PLATELET # BLD AUTO: 325 K/UL — SIGNIFICANT CHANGE UP (ref 150–400)
RBC # BLD: 2.81 M/UL — LOW (ref 4.2–5.8)
RBC # FLD: 13.8 % — SIGNIFICANT CHANGE UP (ref 10.3–14.5)
WBC # BLD: 7.12 K/UL — SIGNIFICANT CHANGE UP (ref 3.8–10.5)
WBC # FLD AUTO: 7.12 K/UL — SIGNIFICANT CHANGE UP (ref 3.8–10.5)

## 2024-04-12 RX ORDER — ACETAMINOPHEN 500 MG
650 TABLET ORAL ONCE
Refills: 0 | Status: COMPLETED | OUTPATIENT
Start: 2024-04-12 | End: 2024-04-12

## 2024-04-12 RX ADMIN — Medication 1 TABLET(S): at 11:14

## 2024-04-12 RX ADMIN — ENOXAPARIN SODIUM 40 MILLIGRAM(S): 100 INJECTION SUBCUTANEOUS at 18:17

## 2024-04-12 RX ADMIN — Medication 650 MILLIGRAM(S): at 23:00

## 2024-04-12 RX ADMIN — Medication 81 MILLIGRAM(S): at 11:14

## 2024-04-12 RX ADMIN — Medication 650 MILLIGRAM(S): at 21:56

## 2024-04-12 RX ADMIN — POLYETHYLENE GLYCOL 3350 17 GRAM(S): 17 POWDER, FOR SOLUTION ORAL at 11:14

## 2024-04-12 RX ADMIN — ATORVASTATIN CALCIUM 20 MILLIGRAM(S): 80 TABLET, FILM COATED ORAL at 21:56

## 2024-04-12 RX ADMIN — GABAPENTIN 300 MILLIGRAM(S): 400 CAPSULE ORAL at 14:16

## 2024-04-12 RX ADMIN — SENNA PLUS 2 TABLET(S): 8.6 TABLET ORAL at 21:56

## 2024-04-12 RX ADMIN — LACOSAMIDE 150 MILLIGRAM(S): 50 TABLET ORAL at 18:17

## 2024-04-12 RX ADMIN — GABAPENTIN 300 MILLIGRAM(S): 400 CAPSULE ORAL at 06:45

## 2024-04-12 RX ADMIN — HYDROMORPHONE HYDROCHLORIDE 0.5 MILLIGRAM(S): 2 INJECTION INTRAMUSCULAR; INTRAVENOUS; SUBCUTANEOUS at 07:20

## 2024-04-12 RX ADMIN — TAMSULOSIN HYDROCHLORIDE 0.4 MILLIGRAM(S): 0.4 CAPSULE ORAL at 21:56

## 2024-04-12 RX ADMIN — ENOXAPARIN SODIUM 40 MILLIGRAM(S): 100 INJECTION SUBCUTANEOUS at 06:45

## 2024-04-12 RX ADMIN — HYDROMORPHONE HYDROCHLORIDE 0.5 MILLIGRAM(S): 2 INJECTION INTRAMUSCULAR; INTRAVENOUS; SUBCUTANEOUS at 06:51

## 2024-04-12 RX ADMIN — LACOSAMIDE 150 MILLIGRAM(S): 50 TABLET ORAL at 06:45

## 2024-04-12 RX ADMIN — GABAPENTIN 300 MILLIGRAM(S): 400 CAPSULE ORAL at 21:56

## 2024-04-12 NOTE — PROGRESS NOTE ADULT - SUBJECTIVE AND OBJECTIVE BOX
Name of Patient : TAMI DUNHAM  MRN: [id number]      Subjective: Patient seen and examined. No new events except as noted.   Doing okay  all drains removed     REVIEW OF SYSTEMS:    CONSTITUTIONAL: No weakness, fevers or chills  EYES/ENT: No visual changes;  No vertigo or throat pain   NECK: No pain or stiffness  RESPIRATORY: No cough, wheezing, hemoptysis; No shortness of breath  CARDIOVASCULAR: No chest pain or palpitations  GASTROINTESTINAL: No abdominal or epigastric pain. No nausea, vomiting, or hematemesis; No diarrhea or constipation. No melena or hematochezia.  GENITOURINARY: No dysuria, frequency or hematuria  NEUROLOGICAL: No numbness or weakness  SKIN: No itching, burning, rashes, or lesions   All other review of systems is negative unless indicated above.    MEDICATIONS:  MEDICATIONS  (STANDING):  aspirin  chewable 81 milliGRAM(s) Oral daily  atorvastatin 20 milliGRAM(s) Oral at bedtime  enoxaparin Injectable 40 milliGRAM(s) SubCutaneous every 12 hours  gabapentin 300 milliGRAM(s) Oral every 8 hours  lacosamide 150 milliGRAM(s) Oral two times a day  multivitamin 1 Tablet(s) Oral daily  polyethylene glycol 3350 17 Gram(s) Oral daily  senna 2 Tablet(s) Oral at bedtime  tamsulosin 0.4 milliGRAM(s) Oral at bedtime      PHYSICAL EXAM:  T(C): 37.2 (04-12-24 @ 21:00), Max: 37.2 (04-12-24 @ 21:00)  HR: 83 (04-12-24 @ 21:00) (71 - 83)  BP: 103/67 (04-12-24 @ 21:00) (90/58 - 103/69)  RR: 18 (04-12-24 @ 21:00) (18 - 18)  SpO2: 97% (04-12-24 @ 21:00) (97% - 100%)  Wt(kg): --  I&O's Summary    12 Apr 2024 07:01  -  13 Apr 2024 00:03  --------------------------------------------------------  IN: 0 mL / OUT: 850 mL / NET: -850 mL          Appearance: Normal	  HEENT:  PERRLA   Lymphatic: No lymphadenopathy   Cardiovascular: Normal S1 S2, no JVD  Respiratory: normal effort , clear  Gastrointestinal:  Soft, Non-tender  Skin: No rashes,  warm to touch  Psychiatry:  Mood & affect appropriate  Musculuskeletal: dressing intact    recent labs, Imaging and EKGs personally reviewed     04-12-24 @ 07:01  -  04-13-24 @ 00:03  --------------------------------------------------------  IN: 0 mL / OUT: 850 mL / NET: -850 mL

## 2024-04-12 NOTE — CONSULT NOTE ADULT - SUBJECTIVE AND OBJECTIVE BOX
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 Became hypotensive in ED and received 2u pRBCs. S/p hematoma evacuation with vascular surgery on 4/5. Hemodynamically stable with stable H/H since. Patient reports feeling well, no acute complaints. Reports pain at wound site with palpation.     PRS consulted for evaluation of wound at L thigh hematoma evacuation site.  Vascular surgery has been applying bacitracin and adaptic daily to wound.      PAST MEDICAL & SURGICAL HISTORY:  History of TIAs      CVA (cerebrovascular accident)      HLD (hyperlipidemia)      Vertigo      Colon polyp      Seizure disorder      History of fasciotomy      H/O arthroscopy of knee      S/P excision of neuroma      History of loop recorder        Allergies    No Known Allergies    Intolerances      Home Medications:  aspirin 81 mg oral delayed release tablet: 1 tab(s) orally once a day (04 Apr 2024 08:35)  atorvastatin 20 mg oral tablet: 1 tab(s) orally once a day (04 Apr 2024 12:30)  Calcium tablet: 1 tablet orally once a day (04 Apr 2024 08:35)  gabapentin 300 mg oral tablet: orally every 8 hours (04 Apr 2024 12:30)  lacosamide 150 mg oral tablet: 1 tab(s) orally 2 times a day (04 Apr 2024 08:35)  Lovenox 150 mg/mL injectable solution: 150 milligram(s) subcutaneously (04 Apr 2024 09:29)  Multiple Vitamins oral tablet: 1 tab(s) orally once a day (04 Apr 2024 08:35)  tamsulosin 0.4 mg oral capsule: 1 cap(s) orally once a day (04 Apr 2024 08:34)  Vyvanse 50 mg oral capsule: 1 cap(s) orally once a day (in the morning) (04 Apr 2024 08:34)    MEDICATIONS  (STANDING):  aspirin  chewable 81 milliGRAM(s) Oral daily  atorvastatin 20 milliGRAM(s) Oral at bedtime  enoxaparin Injectable 40 milliGRAM(s) SubCutaneous every 12 hours  gabapentin 300 milliGRAM(s) Oral every 8 hours  lacosamide 150 milliGRAM(s) Oral two times a day  multivitamin 1 Tablet(s) Oral daily  polyethylene glycol 3350 17 Gram(s) Oral daily  senna 2 Tablet(s) Oral at bedtime  tamsulosin 0.4 milliGRAM(s) Oral at bedtime      SOCIAL HISTORY:  FAMILY HISTORY:  No pertinent family history in first degree relatives        ___________________________________________  OBJECTIVE:  Vital Signs Last 24 Hrs  T(C): 37.1 (12 Apr 2024 07:00), Max: 37.3 (11 Apr 2024 10:00)  T(F): 98.7 (12 Apr 2024 07:00), Max: 99.1 (11 Apr 2024 10:00)  HR: 78 (12 Apr 2024 07:00) (71 - 78)  BP: 90/58 (12 Apr 2024 08:00) (90/58 - 104/70)  BP(mean): --  RR: 18 (12 Apr 2024 07:00) (18 - 18)  SpO2: 99% (12 Apr 2024 07:00) (95% - 100%)    Parameters below as of 12 Apr 2024 07:00  Patient On (Oxygen Delivery Method): room air    CAPILLARY BLOOD GLUCOSE        I&O's Detail    General: Lying in bed, NAD  Neuro: Alert and oriented, moves all extremities spontaneously  HEENT: NCAT, anicteric  Respiratory: Airway patent, respirations unlabored  Extremities: L thigh with exposed dermis and smaller areas of devitalized tissue along incision, wound soft to palpation but firmer at lateral aspect of wound; LLE with well-healed fasciotomy closure sites, re-wrapped in ace bandage  Skin: Warm, dry, appropriate color  ____________________________________________  LABS:  CBC Full  -  ( 12 Apr 2024 06:57 )  WBC Count : 7.12 K/uL  RBC Count : 2.81 M/uL  Hemoglobin : 8.0 g/dL  Hematocrit : 25.9 %  Platelet Count - Automated : 325 K/uL  Mean Cell Volume : 92.2 fl  Mean Cell Hemoglobin : 28.5 pg  Mean Cell Hemoglobin Concentration : 30.9 gm/dL Plastic Surgery Consult Note  (pg [facility]: [id number], [facility]: [phone])    HPI:  53M PMH of CVA, 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 Became hypotensive in ED and received 2u pRBCs. S/p hematoma evacuation with vascular surgery on 4/5. Hemodynamically stable with stable H/H currently. Patient reports feeling well, no acute complaints. Reports pain at wound site with palpation.     PRS consulted for evaluation of wound at L thigh hematoma evacuation site.  Vascular surgery has been applying bacitracin and adaptic daily to wound.      PAST MEDICAL & SURGICAL HISTORY:  History of TIAs      CVA (cerebrovascular accident)      HLD (hyperlipidemia)      Vertigo      Colon polyp      Seizure disorder      History of fasciotomy      H/O arthroscopy of knee      S/P excision of neuroma      History of loop recorder        Allergies    No Known Allergies    Intolerances      Home Medications:  aspirin 81 mg oral delayed release tablet: 1 tab(s) orally once a day (04 Apr 2024 08:35)  atorvastatin 20 mg oral tablet: 1 tab(s) orally once a day (04 Apr 2024 12:30)  Calcium tablet: 1 tablet orally once a day (04 Apr 2024 08:35)  gabapentin 300 mg oral tablet: orally every 8 hours (04 Apr 2024 12:30)  lacosamide 150 mg oral tablet: 1 tab(s) orally 2 times a day (04 Apr 2024 08:35)  Lovenox 150 mg/mL injectable solution: 150 milligram(s) subcutaneously (04 Apr 2024 09:29)  Multiple Vitamins oral tablet: 1 tab(s) orally once a day (04 Apr 2024 08:35)  tamsulosin 0.4 mg oral capsule: 1 cap(s) orally once a day (04 Apr 2024 08:34)  Vyvanse 50 mg oral capsule: 1 cap(s) orally once a day (in the morning) (04 Apr 2024 08:34)    MEDICATIONS  (STANDING):  aspirin  chewable 81 milliGRAM(s) Oral daily  atorvastatin 20 milliGRAM(s) Oral at bedtime  enoxaparin Injectable 40 milliGRAM(s) SubCutaneous every 12 hours  gabapentin 300 milliGRAM(s) Oral every 8 hours  lacosamide 150 milliGRAM(s) Oral two times a day  multivitamin 1 Tablet(s) Oral daily  polyethylene glycol 3350 17 Gram(s) Oral daily  senna 2 Tablet(s) Oral at bedtime  tamsulosin 0.4 milliGRAM(s) Oral at bedtime      SOCIAL HISTORY:  FAMILY HISTORY:  No pertinent family history in first degree relatives        ___________________________________________  OBJECTIVE:  Vital Signs Last 24 Hrs  T(C): 37.1 (12 Apr 2024 07:00), Max: 37.3 (11 Apr 2024 10:00)  T(F): 98.7 (12 Apr 2024 07:00), Max: 99.1 (11 Apr 2024 10:00)  HR: 78 (12 Apr 2024 07:00) (71 - 78)  BP: 90/58 (12 Apr 2024 08:00) (90/58 - 104/70)  BP(mean): --  RR: 18 (12 Apr 2024 07:00) (18 - 18)  SpO2: 99% (12 Apr 2024 07:00) (95% - 100%)    Parameters below as of 12 Apr 2024 07:00  Patient On (Oxygen Delivery Method): room air    CAPILLARY BLOOD GLUCOSE        I&O's Detail    General: Lying in bed, NAD  Neuro: Alert and oriented, moves all extremities spontaneously  HEENT: NCAT, anicteric  Respiratory: Airway patent, respirations unlabored  Extremities: L thigh with exposed dermis and smaller areas of devitalized tissue along incision, wound soft to palpation but firmer at lateral aspect of wound; LLE with well-healed fasciotomy closure sites, re-wrapped in ace bandage  Skin: Warm, dry, appropriate color  ____________________________________________  LABS:  CBC Full  -  ( 12 Apr 2024 06:57 )  WBC Count : 7.12 K/uL  RBC Count : 2.81 M/uL  Hemoglobin : 8.0 g/dL  Hematocrit : 25.9 %  Platelet Count - Automated : 325 K/uL  Mean Cell Volume : 92.2 fl  Mean Cell Hemoglobin : 28.5 pg  Mean Cell Hemoglobin Concentration : 30.9 gm/dL Plastic Surgery Consult Note  (pg [facility]: [id number], [facility]: [phone])    HPI:  53M PMH of CVA, 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 Became hypotensive in ED and received 2u pRBCs. S/p hematoma evacuation with vascular surgery on 4/5. Hemodynamically stable with stable H/H currently. Patient reports feeling well, no acute complaints. Reports pain at wound site with palpation.     PRS consulted for evaluation of wound at L thigh hematoma evacuation site.  Vascular surgery has been applying bacitracin and adaptic daily to wound.      PAST MEDICAL & SURGICAL HISTORY:  History of TIAs      CVA (cerebrovascular accident)      HLD (hyperlipidemia)      Vertigo      Colon polyp      Seizure disorder      History of fasciotomy      H/O arthroscopy of knee      S/P excision of neuroma      History of loop recorder        Allergies    No Known Allergies    Intolerances      Home Medications:  aspirin 81 mg oral delayed release tablet: 1 tab(s) orally once a day (04 Apr 2024 08:35)  atorvastatin 20 mg oral tablet: 1 tab(s) orally once a day (04 Apr 2024 12:30)  Calcium tablet: 1 tablet orally once a day (04 Apr 2024 08:35)  gabapentin 300 mg oral tablet: orally every 8 hours (04 Apr 2024 12:30)  lacosamide 150 mg oral tablet: 1 tab(s) orally 2 times a day (04 Apr 2024 08:35)  Lovenox 150 mg/mL injectable solution: 150 milligram(s) subcutaneously (04 Apr 2024 09:29)  Multiple Vitamins oral tablet: 1 tab(s) orally once a day (04 Apr 2024 08:35)  tamsulosin 0.4 mg oral capsule: 1 cap(s) orally once a day (04 Apr 2024 08:34)  Vyvanse 50 mg oral capsule: 1 cap(s) orally once a day (in the morning) (04 Apr 2024 08:34)    MEDICATIONS  (STANDING):  aspirin  chewable 81 milliGRAM(s) Oral daily  atorvastatin 20 milliGRAM(s) Oral at bedtime  enoxaparin Injectable 40 milliGRAM(s) SubCutaneous every 12 hours  gabapentin 300 milliGRAM(s) Oral every 8 hours  lacosamide 150 milliGRAM(s) Oral two times a day  multivitamin 1 Tablet(s) Oral daily  polyethylene glycol 3350 17 Gram(s) Oral daily  senna 2 Tablet(s) Oral at bedtime  tamsulosin 0.4 milliGRAM(s) Oral at bedtime      SOCIAL HISTORY:  FAMILY HISTORY:  No pertinent family history in first degree relatives        ___________________________________________  OBJECTIVE:  Vital Signs Last 24 Hrs  T(C): 37.1 (12 Apr 2024 07:00), Max: 37.3 (11 Apr 2024 10:00)  T(F): 98.7 (12 Apr 2024 07:00), Max: 99.1 (11 Apr 2024 10:00)  HR: 78 (12 Apr 2024 07:00) (71 - 78)  BP: 90/58 (12 Apr 2024 08:00) (90/58 - 104/70)  BP(mean): --  RR: 18 (12 Apr 2024 07:00) (18 - 18)  SpO2: 99% (12 Apr 2024 07:00) (95% - 100%)    Parameters below as of 12 Apr 2024 07:00  Patient On (Oxygen Delivery Method): room air    CAPILLARY BLOOD GLUCOSE        I&O's Detail    General: Lying in bed, NAD  Neuro: Alert and oriented, moves all extremities spontaneously  HEENT: NCAT, anicteric  Respiratory: Airway patent, respirations unlabored  Extremities: L thigh with exposed dermis and smaller areas of devitalized tissue along incision; some SS drainage on adaptic; no purulent drainage or foul odor; wound soft to palpation but firmer at lateral aspect of wound; LLE with well-healed fasciotomy closure sites, re-wrapped in ace bandage  Skin: Warm, dry, appropriate color  ____________________________________________  LABS:  CBC Full  -  ( 12 Apr 2024 06:57 )  WBC Count : 7.12 K/uL  RBC Count : 2.81 M/uL  Hemoglobin : 8.0 g/dL  Hematocrit : 25.9 %  Platelet Count - Automated : 325 K/uL  Mean Cell Volume : 92.2 fl  Mean Cell Hemoglobin : 28.5 pg  Mean Cell Hemoglobin Concentration : 30.9 gm/dL

## 2024-04-12 NOTE — CONSULT NOTE ADULT - ASSESSMENT
53M PMH of CVA, bilateral DVTs on tx Lovenox, LLE compartment syndrome s/p fasciotomy 1/2023, HLD, vertigo, seizure disorder who presented with expanding L thigh hematoma, S/p hematoma evacuation with vascular surgery on 4/5. Stable hemodynamics and H/H since. PRS consulted for evaluation of L thigh wound.      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 Stable hemodynamics and H/H since. PRS consulted for evaluation of L thigh wound.      RECS:  - Wound not yet ready for reconstruction  - Continue current local wound care  - Remainder of care per primary      Plastic Surgery   [facility]: [id number]  [facility]: [phone] 53M PMH of CVA, bilateral DVTs on tx Lovenox, LLE compartment syndrome s/p fasciotomy 1/2023, HLD, vertigo, seizure disorder who presented with expanding L thigh hematoma, S/p hematoma evacuation with vascular surgery on 4/5. Stable hemodynamics and H/H currently. PRS consulted for evaluation of L thigh wound.      RECS:  - Wound not yet ready for reconstruction  - Continue current local wound care  - Remainder of care per primary      Plastic Surgery   [facility]: [id number]  [facility]: [phone] 53M PMH of CVA, bilateral DVTs on tx Lovenox, LLE compartment syndrome s/p fasciotomy 1/2023, HLD, vertigo, seizure disorder who presented with expanding L thigh hematoma, S/p hematoma evacuation with vascular surgery on 4/5. Stable hemodynamics and H/H currently. PRS consulted for evaluation of L thigh wound.      RECS:  - Wound not yet ready for reconstruction  - Continue current local wound care  - Remainder of care per primary    Discussed with attending Dr. 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 Stable hemodynamics and H/H currently. PRS consulted for evaluation of L thigh wound.      RECS:  - Wound not yet ready for reconstruction  - Continue current local wound care  - Remainder of care per primary  - Please reconsult as needed    Discussed with attending Dr. Flynn      Plastic Surgery   [facility]: [id number]  [facility]: [phone]

## 2024-04-12 NOTE — PROGRESS NOTE ADULT - ASSESSMENT
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 Smith presents with lower extremity pain. He was found to have a hematoma on the medial left thigh. The patient is hemodynamically stable with Hgb 12 g/dL and intact motor and sensory functions in the lower extremity. CTA demonstrating active subcutaneous and gracilis hematomas, large in size compared to CT performed four hours prior. S/p OR on 4/5 for LLE hematoma evacuation.    Recommendations:  - Daily dressing changes of L groins site by nursing staff - please apply bacitracin, adaptic, gauze to incision site  - ASHLEY drains dc'ed   - pain control PRN  - appreciate care per primary team  - pt to follow up with Dr. Mariah Christie's office in 1-2 week after discharge for suture removal of incision site  - please contact us with questions or concerns    Discussed with vascular surgery fellow on behalf of Dr. Christie.    Vascular Surgery   [phone]

## 2024-04-12 NOTE — PROGRESS NOTE ADULT - ASSESSMENT
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 On admission, vitals significant for hypotension and labs showing anemia with CTA showing large hematoma of the LLE and small RLE hematoma with repeat CTA showing interval increase of the hematoma and new small LLE hematoma s/p 2units pRBCs. He is now admitted to the MICU for further management.       # LE Hematoma, Anemia   - 4/4 CT A w/ Mod- large SubC hematoma in the anteromedial L upper thigh containing scattered foci of high attenuation concerning for active hemorrhage within the hematoma; Small collection in the anterior upper right thigh suspicious for a hematoma. Mild subcutaneous inflammatory change in the visualized anterior and lateral upper right thigh with associated foci of subcutaneous gas.   - 4/5 CT w/ Interval increase in size of an anteromedial L upper thigh subc  hematoma with scattered foci of high attenuation within the hematoma compatible with active hemorrhage; New left gracilis intramuscular hematoma which could be extending from the subcutaneous hematoma.  - Duplex negative for DVT   - S/P Hematoma evacuation with Vascular on 4/5. Monitor drain output --> Superior drain DC'd   - S/P PRBCs  X 4   - C/w ASA and Lovenox 40 BID; Monitor H/H   - Pulse checks per protocol;  Monitor LE closely  - Serial CBC, Maintain active T/S, transfuse for Hgb < 7.0  - IR, Vascular, MICU care appreciated   - Per vascular surgery    #leukocytosis  - noted to have elevated WBC  - likely inflammatory iso blood loss with low concern for infection  - trend WBC and fever curve  - If febrile, check pan cultures    #Seizure disorder  # CVA with residual expressive aphasia  - C/w home AEDs (lacosamide and vyanese   - C/w home gabapentin   - Seizure precautions  - Neuro eval appreciated; F/u recs     #HLD  - C/w home Lipitor    #BPH  - C/w flomax    #DVTs  - hx of bilateral DVTs --> Repeat duplex negative   - ASA and DVT PPX resumed       D/CPLANING

## 2024-04-12 NOTE — PROGRESS NOTE ADULT - SUBJECTIVE AND OBJECTIVE BOX
Vascular Surgery Progress Note     Subjective:  Patient seen and examined. Patient endorses some incisional discomfort but denies acute complaints.       Vital Signs:  Vital Signs Last 24 Hrs  T(C): 37.1 (12 Apr 2024 07:00), Max: 37.1 (12 Apr 2024 07:00)  T(F): 98.7 (12 Apr 2024 07:00), Max: 98.7 (12 Apr 2024 07:00)  HR: 78 (12 Apr 2024 07:00) (71 - 78)  BP: 90/58 (12 Apr 2024 08:00) (90/58 - 104/70)  BP(mean): --  RR: 18 (12 Apr 2024 07:00) (18 - 18)  SpO2: 99% (12 Apr 2024 07:00) (98% - 100%)    Parameters below as of 12 Apr 2024 07:00  Patient On (Oxygen Delivery Method): room air        CAPILLARY BLOOD GLUCOSE          I&O's Detail    12 Apr 2024 07:01  -  12 Apr 2024 11:59  --------------------------------------------------------  IN:  Total IN: 0 mL    OUT:    Voided (mL): 850 mL  Total OUT: 850 mL    Total NET: -850 mL            Physical Exam:  General: Not acutely distressed  Respiratory: nonlabored respirations  CV: pulse present   Extremities: LLE medial thigh with superficial skin surrounding incision covered by adaptic, dressing strikethrough, no purulent drainage or foul odor          Labs:                                  8.0    7.12  )-----------( 325      ( 12 Apr 2024 06:57 )             25.9

## 2024-04-13 LAB
HCT VFR BLD CALC: 17.8 % — CRITICAL LOW (ref 39–50)
HCT VFR BLD CALC: 26.1 % — LOW (ref 39–50)
HGB BLD-MCNC: 5.8 G/DL — CRITICAL LOW (ref 13–17)
HGB BLD-MCNC: 8.5 G/DL — LOW (ref 13–17)
MCHC RBC-ENTMCNC: 29.3 PG — SIGNIFICANT CHANGE UP (ref 27–34)
MCHC RBC-ENTMCNC: 29.5 PG — SIGNIFICANT CHANGE UP (ref 27–34)
MCHC RBC-ENTMCNC: 32.6 GM/DL — SIGNIFICANT CHANGE UP (ref 32–36)
MCHC RBC-ENTMCNC: 32.6 GM/DL — SIGNIFICANT CHANGE UP (ref 32–36)
MCV RBC AUTO: 89.9 FL — SIGNIFICANT CHANGE UP (ref 80–100)
MCV RBC AUTO: 90.6 FL — SIGNIFICANT CHANGE UP (ref 80–100)
NRBC # BLD: 0 /100 WBCS — SIGNIFICANT CHANGE UP (ref 0–0)
NRBC # BLD: 0 /100 WBCS — SIGNIFICANT CHANGE UP (ref 0–0)
PLATELET # BLD AUTO: 364 K/UL — SIGNIFICANT CHANGE UP (ref 150–400)
PLATELET # BLD AUTO: 395 K/UL — SIGNIFICANT CHANGE UP (ref 150–400)
RBC # BLD: 1.98 M/UL — LOW (ref 4.2–5.8)
RBC # BLD: 2.88 M/UL — LOW (ref 4.2–5.8)
RBC # FLD: 13.6 % — SIGNIFICANT CHANGE UP (ref 10.3–14.5)
RBC # FLD: 13.8 % — SIGNIFICANT CHANGE UP (ref 10.3–14.5)
WBC # BLD: 10.25 K/UL — SIGNIFICANT CHANGE UP (ref 3.8–10.5)
WBC # BLD: 11.02 K/UL — HIGH (ref 3.8–10.5)
WBC # FLD AUTO: 10.25 K/UL — SIGNIFICANT CHANGE UP (ref 3.8–10.5)
WBC # FLD AUTO: 11.02 K/UL — HIGH (ref 3.8–10.5)

## 2024-04-13 PROCEDURE — 73706 CT ANGIO LWR EXTR W/O&W/DYE: CPT | Mod: 26,LT

## 2024-04-13 RX ORDER — ACETAMINOPHEN 500 MG
650 TABLET ORAL ONCE
Refills: 0 | Status: COMPLETED | OUTPATIENT
Start: 2024-04-13 | End: 2024-04-13

## 2024-04-13 RX ADMIN — LACOSAMIDE 150 MILLIGRAM(S): 50 TABLET ORAL at 17:23

## 2024-04-13 RX ADMIN — ENOXAPARIN SODIUM 40 MILLIGRAM(S): 100 INJECTION SUBCUTANEOUS at 17:23

## 2024-04-13 RX ADMIN — GABAPENTIN 300 MILLIGRAM(S): 400 CAPSULE ORAL at 21:10

## 2024-04-13 RX ADMIN — Medication 1 TABLET(S): at 11:54

## 2024-04-13 RX ADMIN — ATORVASTATIN CALCIUM 20 MILLIGRAM(S): 80 TABLET, FILM COATED ORAL at 21:10

## 2024-04-13 RX ADMIN — GABAPENTIN 300 MILLIGRAM(S): 400 CAPSULE ORAL at 05:40

## 2024-04-13 RX ADMIN — SENNA PLUS 2 TABLET(S): 8.6 TABLET ORAL at 21:10

## 2024-04-13 RX ADMIN — Medication 650 MILLIGRAM(S): at 14:07

## 2024-04-13 RX ADMIN — GABAPENTIN 300 MILLIGRAM(S): 400 CAPSULE ORAL at 13:26

## 2024-04-13 RX ADMIN — LACOSAMIDE 150 MILLIGRAM(S): 50 TABLET ORAL at 05:40

## 2024-04-13 RX ADMIN — Medication 650 MILLIGRAM(S): at 15:07

## 2024-04-13 RX ADMIN — ENOXAPARIN SODIUM 40 MILLIGRAM(S): 100 INJECTION SUBCUTANEOUS at 05:40

## 2024-04-13 RX ADMIN — Medication 81 MILLIGRAM(S): at 11:54

## 2024-04-13 RX ADMIN — TAMSULOSIN HYDROCHLORIDE 0.4 MILLIGRAM(S): 0.4 CAPSULE ORAL at 21:10

## 2024-04-13 RX ADMIN — POLYETHYLENE GLYCOL 3350 17 GRAM(S): 17 POWDER, FOR SOLUTION ORAL at 11:54

## 2024-04-13 RX ADMIN — Medication 1 APPLICATION(S): at 05:42

## 2024-04-13 NOTE — CHART NOTE - NSCHARTNOTEFT_GEN_A_CORE
Medicine NP ([id number])    1800; called by RN; pt. stated left thigh appears" more swollen". VSS  CBC ordered.       1850;  CBC resulted; H/H 5.8/17.8.  4/13 AM H/H 8.5/26.1. Vascular [facility] resident notified; will evaluate pt.  Dr. Diez notified;  - 2 units PRBC stat ordered   -d/c Lovenox *& ASA  -place NPO  repeat CTA LLE ordered stat Medicine NP ([id number])    1800; called by RN; pt. stated left thigh appears" more swollen". VSS  CBC ordered.   P/E;  Ext:  Left thigh skin firm, medial left thigh with healing incision/sutures; no drainage,erythema, or external bleeding   Left femoral, Pedal pulses present  skin warm, dry, with resolving yellow/blue ecchymoses.     1850;  CBC resulted; H/H 5.8/17.8.  4/13 AM H/H 8.5/26.1. Vascular [facility] resident notified; will evaluate pt.  Dr. Diez notified;  - 2 units PRBC stat ordered   -d/c Lovenox *& ASA  -place NPO  -repeat CTA LLE ordered stat  1915; signout given to night NP

## 2024-04-13 NOTE — PROVIDER CONTACT NOTE (CRITICAL VALUE NOTIFICATION) - BACKGROUND
Patient admitted for left thigh hematoma 2/2 compartment syndrome s/p facsiotomy, with h/o seizure disorder, HLD, CVA, h/o TIA's

## 2024-04-13 NOTE — PROGRESS NOTE ADULT - SUBJECTIVE AND OBJECTIVE BOX
Name of Patient : TAMI DUNHAM  MRN: [id number]  Date of visit: 04-13-24       Subjective: Patient seen and examined. No new events except as noted.   LE pain and swelling   drained removed yeterday      REVIEW OF SYSTEMS:    CONSTITUTIONAL: No weakness, fevers or chills  EYES/ENT: No visual changes;  No vertigo or throat pain   NECK: No pain or stiffness  RESPIRATORY: No cough, wheezing, hemoptysis; No shortness of breath  CARDIOVASCULAR: No chest pain or palpitations  GASTROINTESTINAL: No abdominal or epigastric pain. No nausea, vomiting, or hematemesis; No diarrhea or constipation. No melena or hematochezia.  GENITOURINARY: No dysuria, frequency or hematuria  NEUROLOGICAL: No numbness or weakness  SKIN: LE swelling  All other review of systems is negative unless indicated above.    MEDICATIONS:  MEDICATIONS  (STANDING):  aspirin  chewable 81 milliGRAM(s) Oral daily  atorvastatin 20 milliGRAM(s) Oral at bedtime  enoxaparin Injectable 40 milliGRAM(s) SubCutaneous every 12 hours  gabapentin 300 milliGRAM(s) Oral every 8 hours  lacosamide 150 milliGRAM(s) Oral two times a day  multivitamin 1 Tablet(s) Oral daily  polyethylene glycol 3350 17 Gram(s) Oral daily  senna 2 Tablet(s) Oral at bedtime  tamsulosin 0.4 milliGRAM(s) Oral at bedtime      PHYSICAL EXAM:  T(C): 36.9 (04-13-24 @ 15:32), Max: 37.2 (04-12-24 @ 21:00)  HR: 71 (04-13-24 @ 15:32) (71 - 83)  BP: 101/64 (04-13-24 @ 15:32) (93/59 - 103/67)  RR: 18 (04-13-24 @ 15:32) (18 - 18)  SpO2: 99% (04-13-24 @ 15:32) (95% - 99%)  Wt(kg): --  I&O's Summary    12 Apr 2024 07:01  -  13 Apr 2024 07:00  --------------------------------------------------------  IN: 0 mL / OUT: 1650 mL / NET: -1650 mL          Appearance: Normal	  HEENT:  PERRLA   Lymphatic: No lymphadenopathy   Cardiovascular: Normal S1 S2, no JVD  Respiratory: normal effort , clear  Gastrointestinal:  Soft, Non-tender  Skin: No rashes,  warm to touch  Psychiatry:  Mood & affect appropriate  Musculuskeletal: L LE pain and swelling    recent labs, Imaging and EKGs personally reviewed       04-12-24 @ 07:01  -  04-13-24 @ 07:00  --------------------------------------------------------  IN: 0 mL / OUT: 1650 mL / NET: -1650 mL                            5.8    11.02 )-----------( 395      ( 13 Apr 2024 18:33 )             17.8

## 2024-04-13 NOTE — PROGRESS NOTE ADULT - ASSESSMENT
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 On admission, vitals significant for hypotension and labs showing anemia with CTA showing large hematoma of the LLE and small RLE hematoma with repeat CTA showing interval increase of the hematoma and new small LLE hematoma s/p 2units pRBCs. He is now admitted to the MICU for further management.       # LE Hematoma, Anemia   - 4/4 CT A w/ Mod- large SubC hematoma in the anteromedial L upper thigh containing scattered foci of high attenuation concerning for active hemorrhage within the hematoma; Small collection in the anterior upper right thigh suspicious for a hematoma. Mild subcutaneous inflammatory change in the visualized anterior and lateral upper right thigh with associated foci of subcutaneous gas.   - 4/5 CT w/ Interval increase in size of an anteromedial L upper thigh subc  hematoma with scattered foci of high attenuation within the hematoma compatible with active hemorrhage; New left gracilis intramuscular hematoma which could be extending from the subcutaneous hematoma.  - Duplex negative for DVT   - S/P Hematoma evacuation with Vascular on 4/5. Monitor drain output --> Superior drain DC'd   - S/P PRBCs  X 4   - C/w ASA and Lovenox 40 BID; Monitor H/H   - Pulse checks per protocol;  Monitor LE closely  - IR, Vascular, MICU care appreciated   - Per vascular surgery    - worsneing LE swelling  - vascualr follow up  - check repeat CBC  - Check LE CT   - R/O compartment synd     #leukocytosis  - noted to have elevated WBC  - likely inflammatory iso blood loss with low concern for infection  - trend WBC and fever curve  - If febrile, check pan cultures    #Seizure disorder  # CVA with residual expressive aphasia  - C/w home AEDs (lacosamide and vyanese   - C/w home gabapentin   - Seizure precautions  - Neuro eval appreciated; F/u recs     #HLD  - C/w home Lipitor    #BPH  - C/w flomax    #DVTs  - hx of bilateral DVTs --> Repeat duplex negative         Hold DVT and ASA

## 2024-04-14 LAB
HCT VFR BLD CALC: 29.2 % — LOW (ref 39–50)
HCT VFR BLD CALC: 29.8 % — LOW (ref 39–50)
HGB BLD-MCNC: 9.5 G/DL — LOW (ref 13–17)
HGB BLD-MCNC: 9.9 G/DL — LOW (ref 13–17)
MCHC RBC-ENTMCNC: 28.6 PG — SIGNIFICANT CHANGE UP (ref 27–34)
MCHC RBC-ENTMCNC: 28.8 PG — SIGNIFICANT CHANGE UP (ref 27–34)
MCHC RBC-ENTMCNC: 32.5 GM/DL — SIGNIFICANT CHANGE UP (ref 32–36)
MCHC RBC-ENTMCNC: 33.2 GM/DL — SIGNIFICANT CHANGE UP (ref 32–36)
MCV RBC AUTO: 86.1 FL — SIGNIFICANT CHANGE UP (ref 80–100)
MCV RBC AUTO: 88.5 FL — SIGNIFICANT CHANGE UP (ref 80–100)
NRBC # BLD: 0 /100 WBCS — SIGNIFICANT CHANGE UP (ref 0–0)
NRBC # BLD: 0 /100 WBCS — SIGNIFICANT CHANGE UP (ref 0–0)
PLATELET # BLD AUTO: 351 K/UL — SIGNIFICANT CHANGE UP (ref 150–400)
PLATELET # BLD AUTO: 392 K/UL — SIGNIFICANT CHANGE UP (ref 150–400)
RBC # BLD: 3.3 M/UL — LOW (ref 4.2–5.8)
RBC # BLD: 3.46 M/UL — LOW (ref 4.2–5.8)
RBC # FLD: 14.6 % — HIGH (ref 10.3–14.5)
RBC # FLD: 14.7 % — HIGH (ref 10.3–14.5)
WBC # BLD: 10.3 K/UL — SIGNIFICANT CHANGE UP (ref 3.8–10.5)
WBC # BLD: 12.61 K/UL — HIGH (ref 3.8–10.5)
WBC # FLD AUTO: 10.3 K/UL — SIGNIFICANT CHANGE UP (ref 3.8–10.5)
WBC # FLD AUTO: 12.61 K/UL — HIGH (ref 3.8–10.5)

## 2024-04-14 RX ORDER — MECLIZINE HCL 12.5 MG
12.5 TABLET ORAL THREE TIMES A DAY
Refills: 0 | Status: DISCONTINUED | OUTPATIENT
Start: 2024-04-14 | End: 2024-04-19

## 2024-04-14 RX ORDER — ACETAMINOPHEN 500 MG
1000 TABLET ORAL ONCE
Refills: 0 | Status: COMPLETED | OUTPATIENT
Start: 2024-04-14 | End: 2024-04-14

## 2024-04-14 RX ADMIN — HYDROMORPHONE HYDROCHLORIDE 0.5 MILLIGRAM(S): 2 INJECTION INTRAMUSCULAR; INTRAVENOUS; SUBCUTANEOUS at 09:30

## 2024-04-14 RX ADMIN — HYDROMORPHONE HYDROCHLORIDE 0.5 MILLIGRAM(S): 2 INJECTION INTRAMUSCULAR; INTRAVENOUS; SUBCUTANEOUS at 09:06

## 2024-04-14 RX ADMIN — TAMSULOSIN HYDROCHLORIDE 0.4 MILLIGRAM(S): 0.4 CAPSULE ORAL at 21:29

## 2024-04-14 RX ADMIN — GABAPENTIN 300 MILLIGRAM(S): 400 CAPSULE ORAL at 13:33

## 2024-04-14 RX ADMIN — Medication 1000 MILLIGRAM(S): at 17:43

## 2024-04-14 RX ADMIN — LACOSAMIDE 150 MILLIGRAM(S): 50 TABLET ORAL at 17:01

## 2024-04-14 RX ADMIN — GABAPENTIN 300 MILLIGRAM(S): 400 CAPSULE ORAL at 21:29

## 2024-04-14 RX ADMIN — Medication 1 TABLET(S): at 11:22

## 2024-04-14 RX ADMIN — SENNA PLUS 2 TABLET(S): 8.6 TABLET ORAL at 21:29

## 2024-04-14 RX ADMIN — ATORVASTATIN CALCIUM 20 MILLIGRAM(S): 80 TABLET, FILM COATED ORAL at 21:29

## 2024-04-14 RX ADMIN — Medication 400 MILLIGRAM(S): at 17:01

## 2024-04-14 NOTE — PROGRESS NOTE ADULT - SUBJECTIVE AND OBJECTIVE BOX
Name of Patient : TAMI DUNHAM  MRN: [id number]  Date of visit: 04-14-24       Subjective: Patient seen and examined. No new events except as noted.     REVIEW OF SYSTEMS:    CONSTITUTIONAL: No weakness, fevers or chills  EYES/ENT: No visual changes;  No vertigo or throat pain   NECK: No pain or stiffness  RESPIRATORY: No cough, wheezing, hemoptysis; No shortness of breath  CARDIOVASCULAR: No chest pain or palpitations  GASTROINTESTINAL: No abdominal or epigastric pain. No nausea, vomiting, or hematemesis; No diarrhea or constipation. No melena or hematochezia.  GENITOURINARY: No dysuria, frequency or hematuria  NEUROLOGICAL: No numbness or weakness  SKIN: No itching, burning, rashes, or lesions   All other review of systems is negative unless indicated above.    MEDICATIONS:  MEDICATIONS  (STANDING):  atorvastatin 20 milliGRAM(s) Oral at bedtime  gabapentin 300 milliGRAM(s) Oral every 8 hours  lacosamide 150 milliGRAM(s) Oral two times a day  multivitamin 1 Tablet(s) Oral daily  polyethylene glycol 3350 17 Gram(s) Oral daily  senna 2 Tablet(s) Oral at bedtime  tamsulosin 0.4 milliGRAM(s) Oral at bedtime      PHYSICAL EXAM:  T(C): 36.3 (04-14-24 @ 15:35), Max: 37 (04-13-24 @ 23:20)  HR: 72 (04-14-24 @ 15:35) (72 - 76)  BP: 109/72 (04-14-24 @ 15:35) (92/57 - 112/69)  RR: 18 (04-14-24 @ 15:35) (16 - 18)  SpO2: 99% (04-14-24 @ 15:35) (96% - 99%)  Wt(kg): --  I&O's Summary    13 Apr 2024 07:01  -  14 Apr 2024 07:00  --------------------------------------------------------  IN: 0 mL / OUT: 1125 mL / NET: -1125 mL    14 Apr 2024 07:01  -  14 Apr 2024 22:48  --------------------------------------------------------  IN: 100 mL / OUT: 0 mL / NET: 100 mL          Appearance: Normal	  HEENT:  PERRLA   Lymphatic: No lymphadenopathy   Cardiovascular: Normal S1 S2, no JVD  Respiratory: normal effort , clear  Gastrointestinal:  Soft, Non-tender  Skin: No rashes,  warm to touch  Psychiatry:  Mood & affect appropriate  Musculuskeletal: No edema    recent labs, Imaging and EKGs personally reviewed     04-13-24 @ 07:01  -  04-14-24 @ 07:00  --------------------------------------------------------  IN: 0 mL / OUT: 1125 mL / NET: -1125 mL    04-14-24 @ 07:01  -  04-14-24 @ 22:48  --------------------------------------------------------  IN: 100 mL / OUT: 0 mL / NET: 100 mL                            9.9    12.61 )-----------( 392      ( 14 Apr 2024 18:32 )             29.8

## 2024-04-14 NOTE — PROGRESS NOTE ADULT - SUBJECTIVE AND OBJECTIVE BOX
Neurology        S: patient seen.  c/o L thigh pain         Medications: MEDICATIONS  (STANDING):  atorvastatin 20 milliGRAM(s) Oral at bedtime  gabapentin 300 milliGRAM(s) Oral every 8 hours  lacosamide 150 milliGRAM(s) Oral two times a day  multivitamin 1 Tablet(s) Oral daily  polyethylene glycol 3350 17 Gram(s) Oral daily  senna 2 Tablet(s) Oral at bedtime  tamsulosin 0.4 milliGRAM(s) Oral at bedtime    MEDICATIONS  (PRN):  bacitracin   Ointment 1 Application(s) Topical daily PRN daily dressing change  HYDROmorphone  Injectable 0.5 milliGRAM(s) IV Push every 4 hours PRN Severe Pain (7 - 10)  HYDROmorphone  Injectable 0.25 milliGRAM(s) IV Push four times a day PRN Moderate Pain (4 - 6)       Vitals:  Vital Signs Last 24 Hrs  T(C): 36.4 (14 Apr 2024 08:36), Max: 37 (13 Apr 2024 23:20)  T(F): 97.5 (14 Apr 2024 08:36), Max: 98.6 (13 Apr 2024 23:20)  HR: 76 (14 Apr 2024 08:36) (71 - 76)  BP: 92/57 (14 Apr 2024 08:36) (92/57 - 112/69)  BP(mean): --  RR: 18 (14 Apr 2024 08:36) (16 - 18)  SpO2: 97% (14 Apr 2024 08:36) (96% - 99%)    Parameters below as of 14 Apr 2024 08:36  Patient On (Oxygen Delivery Method): room air                   General Exam:   General Appearance: Appropriately dressed and in no acute distress       Head: Normocephalic, atraumatic and no dysmorphic features  Ear, Nose, and Throat: Moist mucous membranes  CVS: S1S2+  Resp: No SOB, no wheeze or rhonchi  GI: soft NT/ND  Extremities: No edema or cyanosis L leg thigh hematoma   Skin: No bruises or rashes     Neurological Exam:  Mental Status: Awake, alert and oriented x 3 minimal WFD .  Able to follow simple and complex verbal commands. Able to name and repeat. fluent speech. No obvious aphasia or dysarthria noted.   Cranial Nerves: PERRL, EOMI, VFFC, sensation V1-V3 intact,  no obvious facial asymmetry, equal elevation of palate, scm/trap 5/5, tongue is midline on protrusion. no obvious papilledema on fundoscopic exam. hearing is grossly intact.   Motor: Normal bulk, tone and strength throughout. Fine finger movements were intact and symmetric. no tremors or drift noted.    Sensation: Intact to light touch and pinprick throughout. no right/left confusion. no extinction to tactile on DSS.  .   Reflexes: 1+ throughout at biceps, brachioradialis, triceps, patellars and ankles bilaterally and equal. No clonus. R toe and L toe were both downgoing.  Coordination: No dysmetria on FNF    Gait: deferred     Data/Labs/Imaging which I personally reviewed.        LABS:                          9.5    10.30 )-----------( 351      ( 14 Apr 2024 06:58 )             29.2

## 2024-04-14 NOTE — PROGRESS NOTE ADULT - ASSESSMENT
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 Smith presents with lower extremity pain. He was found to have a hematoma on the medial left thigh. The patient is hemodynamically stable with Hgb 12 g/dL and intact motor and sensory functions in the lower extremity. CTA demonstrating active subcutaneous and gracilis hematomas, large in size compared to CT performed four hours prior. S/p OR on 4/5 for LLE hematoma evacuation. CTA obtained on 4/13 and 2 u prbc transfusion due to H/H drop to 5.8/17.8. Post transfusion h/h 9.5/29.2.     Recommendations:  - Daily dressing changes of L groins site by nursing staff - please apply bacitracin, adaptic, gauze to incision site  - f/u final CTA read  - pain control PRN  - appreciate care per primary team  - pt to follow up with Dr. Mariah Christie's office in 1-2 week after discharge for suture removal of incision site    Discussed with vascular surgery fellow on behalf of Dr. Christie.    Vascular Surgery   [phone]

## 2024-04-14 NOTE — PROGRESS NOTE ADULT - SUBJECTIVE AND OBJECTIVE BOX
Vascular Surgery Progress Note     24hour Events: Patient received 2u prbc and cta due to c/f LLE hematoma. CTA prelim read did not show signs of active bleeding and a decrease in the hematoma. Post transfusion h/h 9.5/29.2 from 5.8/17.8    Subjective:  Patient seen and examined. Patient endorses some numbness in his L leg which has been present since the surgery.       Vital Signs:  Vital Signs Last 24 Hrs  T(C): 36.4 (14 Apr 2024 08:36), Max: 37 (13 Apr 2024 23:20)  T(F): 97.5 (14 Apr 2024 08:36), Max: 98.6 (13 Apr 2024 23:20)  HR: 76 (14 Apr 2024 08:36) (71 - 76)  BP: 92/57 (14 Apr 2024 08:36) (92/57 - 112/69)  RR: 18 (14 Apr 2024 08:36) (16 - 18)  SpO2: 97% (14 Apr 2024 08:36) (96% - 99%)    Parameters below as of 14 Apr 2024 08:36  Patient On (Oxygen Delivery Method): room air        CAPILLARY BLOOD GLUCOSE          I&O's Detail    13 Apr 2024 07:01  -  14 Apr 2024 07:00  --------------------------------------------------------  IN:  Total IN: 0 mL    OUT:    Voided (mL): 1125 mL  Total OUT: 1125 mL    Total NET: -1125 mL            Physical Exam:  General: Not acutely distressed  Respiratory: nonlabored respirations  CV: pulse present   Extremities: LLE medial thigh with superficial skin surrounding incision covered by adaptic, dressing strikethrough, no purulent drainage or foul odor, LLE motor intact, femoral, pop, DP, PT pulse, decreased sensation in LLE compared to RLE          Labs:                                  9.5    10.30 )-----------( 351      ( 14 Apr 2024 06:58 )             29.2

## 2024-04-14 NOTE — PROGRESS NOTE ADULT - ASSESSMENT
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 Noticed within the last hour. No falls or trauma to the area. Administered Lovenox just prior to arrival. Denies fevers, chills, nausea, vomiting, urinary symptoms. No recent surgeries  found left thigh hematoma   s/p 2 units of PRBC   s/p hematoma evacuation 4/5, now doing well  s/p PRBC again     Impression:   1) chronic strokes, resid minimal WFD  2) seizure d/o 2/2 strokes  3) ADHD  4) vertigo BPPV, stable   5) new L thigh hematoma   6) LLE compartment syndrome     - AC and AP held again/.   - Lovenox full dose now held.  was injecting in thigh at site of hematoma ; last admission Lovenox started for DVT and changed to BID dosing   - for ADHD gets Vyvanse 50mg daily  - for seizure he is on vimpat 150 mg BID  - for secondary stroke prevention should be on asa 81mg daily and full dose lovenox . now held   - statin therapy with LDL goal < 70mg/dL; atorvastatin 20 at home   - meclizline PRN   - PT/OT   - check FS, glucose control <180  - GI/DVT ppx   - Thank you for allowing me to participate in the care of this patient. Call with questions.   Braxton Forde MD  Vascular Neurology  Office: [phone]

## 2024-04-14 NOTE — PROGRESS NOTE ADULT - ASSESSMENT
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 On admission, vitals significant for hypotension and labs showing anemia with CTA showing large hematoma of the LLE and small RLE hematoma with repeat CTA showing interval increase of the hematoma and new small LLE hematoma s/p 2units pRBCs. He is now admitted to the MICU for further management.       # LE Hematoma, Anemia   - 4/4 CT A w/ Mod- large SubC hematoma in the anteromedial L upper thigh containing scattered foci of high attenuation concerning for active hemorrhage within the hematoma; Small collection in the anterior upper right thigh suspicious for a hematoma. Mild subcutaneous inflammatory change in the visualized anterior and lateral upper right thigh with associated foci of subcutaneous gas.   - 4/5 CT w/ Interval increase in size of an anteromedial L upper thigh subc  hematoma with scattered foci of high attenuation within the hematoma compatible with active hemorrhage; New left gracilis intramuscular hematoma which could be extending from the subcutaneous hematoma.  - Duplex negative for DVT   - S/P Hematoma evacuation with Vascular on 4/5. Monitor drain output --> Superior drain DC'd   - S/P PRBCs  X 4   - C/w ASA and Lovenox 40 BID; Monitor H/H   - Pulse checks per protocol;  Monitor LE closely  - IR, Vascular, MICU care appreciated   - Per vascular surgery    - worsneing LE swelling  - vascualr follow up  - check repeat CBC, S/P transfusion   - Check LE CT   - R/O compartment synd     #leukocytosis  - noted to have elevated WBC  - likely inflammatory iso blood loss with low concern for infection  - trend WBC and fever curve  - If febrile, check pan cultures    #Seizure disorder  # CVA with residual expressive aphasia  - C/w home AEDs (lacosamide and vyanese   - C/w home gabapentin   - Seizure precautions  - Neuro eval appreciated; F/u recs     #HLD  - C/w home Lipitor    #BPH  - C/w flomax    #DVTs  - hx of bilateral DVTs --> Repeat duplex negative         Hold DVT and ASA

## 2024-04-15 LAB
HCT VFR BLD CALC: 30 % — LOW (ref 39–50)
HGB BLD-MCNC: 10 G/DL — LOW (ref 13–17)
MCHC RBC-ENTMCNC: 29 PG — SIGNIFICANT CHANGE UP (ref 27–34)
MCHC RBC-ENTMCNC: 33.3 GM/DL — SIGNIFICANT CHANGE UP (ref 32–36)
MCV RBC AUTO: 87 FL — SIGNIFICANT CHANGE UP (ref 80–100)
NRBC # BLD: 0 /100 WBCS — SIGNIFICANT CHANGE UP (ref 0–0)
PLATELET # BLD AUTO: 403 K/UL — HIGH (ref 150–400)
RBC # BLD: 3.45 M/UL — LOW (ref 4.2–5.8)
RBC # FLD: 14.6 % — HIGH (ref 10.3–14.5)
WBC # BLD: 11.61 K/UL — HIGH (ref 3.8–10.5)
WBC # FLD AUTO: 11.61 K/UL — HIGH (ref 3.8–10.5)

## 2024-04-15 PROCEDURE — 99231 SBSQ HOSP IP/OBS SF/LOW 25: CPT

## 2024-04-15 RX ORDER — ENOXAPARIN SODIUM 100 MG/ML
40 INJECTION SUBCUTANEOUS EVERY 24 HOURS
Refills: 0 | Status: DISCONTINUED | OUTPATIENT
Start: 2024-04-15 | End: 2024-04-16

## 2024-04-15 RX ADMIN — ATORVASTATIN CALCIUM 20 MILLIGRAM(S): 80 TABLET, FILM COATED ORAL at 21:10

## 2024-04-15 RX ADMIN — LACOSAMIDE 150 MILLIGRAM(S): 50 TABLET ORAL at 05:03

## 2024-04-15 RX ADMIN — Medication 1 APPLICATION(S): at 20:47

## 2024-04-15 RX ADMIN — LACOSAMIDE 150 MILLIGRAM(S): 50 TABLET ORAL at 17:16

## 2024-04-15 RX ADMIN — Medication 1 TABLET(S): at 13:05

## 2024-04-15 RX ADMIN — HYDROMORPHONE HYDROCHLORIDE 0.25 MILLIGRAM(S): 2 INJECTION INTRAMUSCULAR; INTRAVENOUS; SUBCUTANEOUS at 05:25

## 2024-04-15 RX ADMIN — TAMSULOSIN HYDROCHLORIDE 0.4 MILLIGRAM(S): 0.4 CAPSULE ORAL at 21:10

## 2024-04-15 RX ADMIN — GABAPENTIN 300 MILLIGRAM(S): 400 CAPSULE ORAL at 21:10

## 2024-04-15 RX ADMIN — GABAPENTIN 300 MILLIGRAM(S): 400 CAPSULE ORAL at 05:03

## 2024-04-15 RX ADMIN — HYDROMORPHONE HYDROCHLORIDE 0.25 MILLIGRAM(S): 2 INJECTION INTRAMUSCULAR; INTRAVENOUS; SUBCUTANEOUS at 05:10

## 2024-04-15 RX ADMIN — GABAPENTIN 300 MILLIGRAM(S): 400 CAPSULE ORAL at 13:06

## 2024-04-15 RX ADMIN — SENNA PLUS 2 TABLET(S): 8.6 TABLET ORAL at 21:09

## 2024-04-15 RX ADMIN — ENOXAPARIN SODIUM 40 MILLIGRAM(S): 100 INJECTION SUBCUTANEOUS at 13:05

## 2024-04-15 RX ADMIN — POLYETHYLENE GLYCOL 3350 17 GRAM(S): 17 POWDER, FOR SOLUTION ORAL at 13:05

## 2024-04-15 NOTE — PROGRESS NOTE ADULT - SUBJECTIVE AND OBJECTIVE BOX
Name of Patient : TAMI DUNHAM  MRN: [id number]  Date of visit: 04-15-24 @ 13:49      Subjective: Patient seen and examined. No new events except as noted.   Patient seen earlier this AM. Sitting OOB TC.  Hgb stable. Lovenox 40 Qd resumed.   Reports LLE pain is better controlled this AM. Denies numbness or tingling      REVIEW OF SYSTEMS:    CONSTITUTIONAL: Generalized weakness   EYES/ENT: No visual changes;  No vertigo or throat pain   NECK: No pain or stiffness  RESPIRATORY: No cough, wheezing, hemoptysis; No shortness of breath  CARDIOVASCULAR: No chest pain or palpitations  GASTROINTESTINAL: No abdominal or epigastric pain. No nausea, vomiting, or hematemesis; No diarrhea or constipation. No melena or hematochezia.  GENITOURINARY: No dysuria, frequency or hematuria  NEUROLOGICAL: No numbness or weakness  SKIN: LLE wrapped in ACE wrap   All other review of systems is negative unless indicated above.    MEDICATIONS:  MEDICATIONS  (STANDING):  atorvastatin 20 milliGRAM(s) Oral at bedtime  enoxaparin Injectable 40 milliGRAM(s) SubCutaneous every 24 hours  gabapentin 300 milliGRAM(s) Oral every 8 hours  lacosamide 150 milliGRAM(s) Oral two times a day  multivitamin 1 Tablet(s) Oral daily  polyethylene glycol 3350 17 Gram(s) Oral daily  senna 2 Tablet(s) Oral at bedtime  tamsulosin 0.4 milliGRAM(s) Oral at bedtime      PHYSICAL EXAM:  T(C): 36.6 (04-15-24 @ 10:27), Max: 37.7 (04-14-24 @ 23:04)  HR: 85 (04-15-24 @ 10:27) (72 - 85)  BP: 108/71 (04-15-24 @ 10:27) (96/61 - 109/72)  RR: 18 (04-15-24 @ 10:27) (18 - 18)  SpO2: 97% (04-15-24 @ 10:27) (95% - 99%)  Wt(kg): --  I&O's Summary    14 Apr 2024 07:01  -  15 Apr 2024 07:00  --------------------------------------------------------  IN: 100 mL / OUT: 400 mL / NET: -300 mL          Appearance: Awake, generalized weakness, sitting OOB TC 	  HEENT: Eyes are open    Lymphatic: No lymphadenopathy grossly   Cardiovascular: Normal    Respiratory: normal effort, clear  Gastrointestinal:  Soft, Non-tender  Skin: No rashes,  warm to touch  Psychiatry:  Mood & affect appropriate  Musculoskeletal: LLE wrapped in ACE wrap           04-14-24 @ 07:01  -  04-15-24 @ 07:00  --------------------------------------------------------  IN: 100 mL / OUT: 400 mL / NET: -300 mL                                10.0   11.61 )-----------( 403      ( 15 Apr 2024 12:37 )             30.0                                 < from: CT Angio Lower Extremity w/ IV Cont, Left (04.13.24 @ 21:59) >  IMPRESSION:  Evolving hematoma in the medial left thigh is substantially decreased in   size compared with CT 4/5/2024 and without evidence for active hemorrhage.      < end of copied text >

## 2024-04-15 NOTE — PROGRESS NOTE ADULT - SUBJECTIVE AND OBJECTIVE BOX
Neurology        S: patient seen.   AC/AP held       Medications: MEDICATIONS  (STANDING):  atorvastatin 20 milliGRAM(s) Oral at bedtime  gabapentin 300 milliGRAM(s) Oral every 8 hours  lacosamide 150 milliGRAM(s) Oral two times a day  multivitamin 1 Tablet(s) Oral daily  polyethylene glycol 3350 17 Gram(s) Oral daily  senna 2 Tablet(s) Oral at bedtime  tamsulosin 0.4 milliGRAM(s) Oral at bedtime    MEDICATIONS  (PRN):  bacitracin   Ointment 1 Application(s) Topical daily PRN daily dressing change  HYDROmorphone  Injectable 0.25 milliGRAM(s) IV Push four times a day PRN Moderate Pain (4 - 6)  HYDROmorphone  Injectable 0.5 milliGRAM(s) IV Push every 4 hours PRN Severe Pain (7 - 10)  meclizine 12.5 milliGRAM(s) Oral three times a day PRN Dizziness       Vitals:  Vital Signs Last 24 Hrs  T(C): 37 (15 Apr 2024 06:50), Max: 37.7 (14 Apr 2024 23:04)  T(F): 98.6 (15 Apr 2024 06:50), Max: 99.8 (14 Apr 2024 23:04)  HR: 72 (15 Apr 2024 06:50) (72 - 82)  BP: 108/57 (15 Apr 2024 06:50) (96/61 - 109/72)  BP(mean): --  RR: 18 (15 Apr 2024 06:50) (18 - 18)  SpO2: 95% (15 Apr 2024 06:50) (95% - 99%)    Parameters below as of 15 Apr 2024 06:50  Patient On (Oxygen Delivery Method): room air                    General Exam:   General Appearance: Appropriately dressed and in no acute distress       Head: Normocephalic, atraumatic and no dysmorphic features  Ear, Nose, and Throat: Moist mucous membranes  CVS: S1S2+  Resp: No SOB, no wheeze or rhonchi  GI: soft NT/ND  Extremities: No edema or cyanosis L leg thigh hematoma   Skin: No bruises or rashes     Neurological Exam:  Mental Status: Awake, alert and oriented x 3 minimal WFD .  Able to follow simple and complex verbal commands. Able to name and repeat. fluent speech. No obvious aphasia or dysarthria noted.   Cranial Nerves: PERRL, EOMI, VFFC, sensation V1-V3 intact,  no obvious facial asymmetry, equal elevation of palate, scm/trap 5/5, tongue is midline on protrusion. no obvious papilledema on fundoscopic exam. hearing is grossly intact.   Motor: Normal bulk, tone and strength throughout. Fine finger movements were intact and symmetric. no tremors or drift noted.    Sensation: Intact to light touch and pinprick throughout. no right/left confusion. no extinction to tactile on DSS.  .   Reflexes: 1+ throughout at biceps, brachioradialis, triceps, patellars and ankles bilaterally and equal. No clonus. R toe and L toe were both downgoing.  Coordination: No dysmetria on FNF    Gait: deferred     Data/Labs/Imaging which I personally reviewed.            LABS:                          9.9    12.61 )-----------( 392      ( 14 Apr 2024 18:32 )             29.8

## 2024-04-15 NOTE — PROGRESS NOTE ADULT - ASSESSMENT
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 Smith presents with lower extremity pain. He was found to have a hematoma on the medial left thigh. The patient is hemodynamically stable with Hgb 12 g/dL and intact motor and sensory functions in the lower extremity. CTA demonstrating active subcutaneous and gracilis hematomas, large in size compared to CT performed four hours prior. S/p OR on 4/5 for LLE hematoma evacuation. CTA obtained on 4/13 and 2 u prbc transfusion due to H/H drop to 5.8/17.8. Post transfusion h/h 9.5/29.2.     Recommendations:  - Daily dressing changes of L groins site by nursing staff - please apply bacitracin, adaptic, gauze to incision site  - apply compression to LLE by ACE bandage   - pain control PRN  - appreciate care per primary team  - pt to follow up with Dr. Mariah Christie's office in 1-2 week after discharge for suture removal of incision site    Discussed with vascular surgery fellow on behalf of Dr. Christie.    Vascular Surgery   [phone]  53M hx chronic DVTs and PEs on Lovenox 150 qd, s/p fasciotomy 2023 with Dr. Smith presents with lower extremity pain. He was found to have a hematoma on the medial left thigh. The patient is hemodynamically stable with Hgb 12 g/dL and intact motor and sensory functions in the lower extremity. CTA demonstrating active subcutaneous and gracilis hematomas, large in size compared to CT performed four hours prior. S/p OR on 4/5 for LLE hematoma evacuation. CTA obtained on 4/13 and 2 u prbc transfusion due to H/H drop to 5.8/17.8. Post transfusion h/h 9.5/29.2.     Recommendations:  - Daily dressing changes of L groins site by nursing staff - please apply bacitracin, adaptic, gauze to incision site  - recommend wound care consult for evaluation and recommendation of LLE groin incision site  - apply compression to LLE by ACE bandage   - pain control PRN  - appreciate care per primary team  - pt to follow up with Dr. Mariah Christie's office in 1-2 week after discharge for suture removal of incision site    Discussed with vascular surgery fellow on behalf of Dr. Christie.    Vascular Surgery   [phone]

## 2024-04-15 NOTE — PROGRESS NOTE ADULT - ASSESSMENT
53y Male with pmhx CVA abd Hx of DVT's    Anemia  - patient anemic (Hb baseline 13-14) 2/2 hematoma; S/P PRBC 2U in ED.    - follow up IR and vascular recs  - transfuse for Hb>7  - monitor cbc Q12  - Hgb stable. no current signs of bleeding noted.   - slight thrombocytosis noted, can check iron panel to rule out iron deficiency as etiology.     DVTs  - hx of bilateral DVTs (noted to resolve 1/24)  - hold lovenox and AC for now due to large hematoma  - pt last seen Dr Varela on 12/2023  - CT angio LLE 4/13/2024 - medial L thigh hematoma dec in size.     MSK  - patient's LLE swollen with concern for compartment syndrome  - Interval increase in size ( 61-->71cm)   - f/u ortho and vascular recs for management for possible fasciotomy   - pain control per primary team  - s/p evacuation of hematoma in OR 4/5  - overall improved condition  - continue ongoing need for acute inpatient rehab    will follow    Cristiana Venegas PA-C  Hematology/Oncology  [facility]  [phone] (office)        53y Male with pmhx CVA abd Hx of DVT's    Anemia  - patient anemic (Hb baseline 13-14) 2/2 hematoma; S/P PRBC 2U in ED.    - follow up IR and vascular recs  - transfuse for Hb>7  - monitor cbc Q12  - Hgb stable. no current signs of bleeding noted.   - slight thrombocytosis noted, can check iron panel to rule out iron deficiency as etiology.     DVTs  - hx of bilateral DVTs (noted to resolve 1/24)  - hold lovenox and AC for now due to large hematoma  - pt last seen Dr Varela on 12/2023  - CT angio LLE 4/13/2024 - medial L thigh hematoma dec in size.   - Was placed back on Lovenox 40 mg qd, can continue. please monitor for signs of bleeding, cbc closely.   - Follow with Dr. Varela outpatient for further intervention.     MSK  - patient's LLE swollen with concern for compartment syndrome  - Interval increase in size ( 61-->71cm)   - f/u ortho and vascular recs for management for possible fasciotomy   - pain control per primary team  - s/p evacuation of hematoma in OR 4/5  - overall improved condition  - continue ongoing need for acute inpatient rehab    will follow    Cristiana Venegas PA-C  Hematology/Oncology  [facility]  [phone] (office)

## 2024-04-15 NOTE — PROGRESS NOTE ADULT - SUBJECTIVE AND OBJECTIVE BOX
Patient is seen today at bedside.  No acute events overnight.  Reports pain near hematoma site with compression, as he sits down.   Otherwise no new concerns.     Patient is a 53y old  Male who presents with a chief complaint of Hematoma (15 Apr 2024 13:48)      MEDICATIONS  (STANDING):  atorvastatin 20 milliGRAM(s) Oral at bedtime  enoxaparin Injectable 40 milliGRAM(s) SubCutaneous every 24 hours  gabapentin 300 milliGRAM(s) Oral every 8 hours  lacosamide 150 milliGRAM(s) Oral two times a day  multivitamin 1 Tablet(s) Oral daily  polyethylene glycol 3350 17 Gram(s) Oral daily  senna 2 Tablet(s) Oral at bedtime  tamsulosin 0.4 milliGRAM(s) Oral at bedtime    MEDICATIONS  (PRN):  bacitracin   Ointment 1 Application(s) Topical daily PRN daily dressing change  HYDROmorphone  Injectable 0.5 milliGRAM(s) IV Push every 4 hours PRN Severe Pain (7 - 10)  HYDROmorphone  Injectable 0.25 milliGRAM(s) IV Push four times a day PRN Moderate Pain (4 - 6)  meclizine 12.5 milliGRAM(s) Oral three times a day PRN Dizziness      Vital Signs Last 24 Hrs  T(C): 36.6 (15 Apr 2024 10:27), Max: 37.7 (14 Apr 2024 23:04)  T(F): 97.8 (15 Apr 2024 10:27), Max: 99.8 (14 Apr 2024 23:04)  HR: 85 (15 Apr 2024 10:27) (72 - 85)  BP: 108/71 (15 Apr 2024 10:27) (96/61 - 109/72)  BP(mean): --  RR: 18 (15 Apr 2024 10:27) (18 - 18)  SpO2: 97% (15 Apr 2024 10:27) (95% - 99%)    Parameters below as of 15 Apr 2024 10:27  Patient On (Oxygen Delivery Method): room air        PE  NAD  Awake, alert  Anicteric, MMM                            10.0   11.61 )-----------( 403      ( 15 Apr 2024 12:37 )             30.0

## 2024-04-15 NOTE — PROGRESS NOTE ADULT - SUBJECTIVE AND OBJECTIVE BOX
Vascular Surgery Progress Note     Subjective:  Patient seen and examined. Patient denies nausea, vomiting, chest pain, SOB.       Vital Signs:  Vital Signs Last 24 Hrs  T(C): 37 (15 Apr 2024 06:50), Max: 37.7 (14 Apr 2024 23:04)  T(F): 98.6 (15 Apr 2024 06:50), Max: 99.8 (14 Apr 2024 23:04)  HR: 72 (15 Apr 2024 06:50) (72 - 82)  BP: 108/57 (15 Apr 2024 06:50) (96/61 - 109/72)  RR: 18 (15 Apr 2024 06:50) (18 - 18)  SpO2: 95% (15 Apr 2024 06:50) (95% - 99%)    Parameters below as of 15 Apr 2024 06:50  Patient On (Oxygen Delivery Method): room air        CAPILLARY BLOOD GLUCOSE          I&O's Detail    14 Apr 2024 07:01  -  15 Apr 2024 07:00  --------------------------------------------------------  IN:    IV PiggyBack: 100 mL  Total IN: 100 mL    OUT:    Voided (mL): 400 mL  Total OUT: 400 mL    Total NET: -300 mL            Physical Exam:  General: Not acutely distressed  Respiratory: nonlabored respirations  CV: pulse present   Extremities: LLE medial thigh with superficial skin surrounding incision covered by adaptic, dressing strikethrough, no purulent drainage or foul odor, incision site edematous LLE motor intact, femoral, pop, DP, PT pulse, decreased sensation in LLE compared to RLE        Labs:                                  9.9    12.61 )-----------( 392      ( 14 Apr 2024 18:32 )             29.8

## 2024-04-15 NOTE — PROGRESS NOTE ADULT - ASSESSMENT
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 Noticed within the last hour. No falls or trauma to the area. Administered Lovenox just prior to arrival. Denies fevers, chills, nausea, vomiting, urinary symptoms. No recent surgeries  found left thigh hematoma   s/p 2 units of PRBC   s/p hematoma evacuation 4/5, now doing well  s/p PRBC again     Impression:   1) chronic strokes, resid minimal WFD  2) seizure d/o 2/2 strokes  3) ADHD  4) vertigo BPPV, stable   5) new L thigh hematoma   6) LLE compartment syndrome     - monitor H/H.  at the least needs to be on ASA.    - monitor for compartment syndrome   - AC and AP held again.   - Lovenox full dose now held.  was injecting in thigh at site of hematoma ; last admission Lovenox started for DVT and changed to BID dosing   - for ADHD gets Vyvanse 50mg daily  - for seizure he is on vimpat 150 mg BID  - for secondary stroke prevention should be on asa 81mg daily and full dose lovenox . now held   - statin therapy with LDL goal < 70mg/dL; atorvastatin 20 at home   - meclizline PRN   - PT/OT   - check FS, glucose control <180  - GI/DVT ppx   - Thank you for allowing me to participate in the care of this patient. Call with questions.   Braxton Forde MD  Vascular Neurology  Office: [phone]

## 2024-04-15 NOTE — PROGRESS NOTE ADULT - SUBJECTIVE AND OBJECTIVE BOX
seen earlier today  pain controlled  participating with therapy     REVIEW OF SYSTEMS  Constitutional - No fever,  No fatigue  HEENT - No vertigo, No neck pain  Neurological - No headaches, No memory loss  Psychiatric - No depression, No anxiety    FUNCTIONAL PROGRESS  PT 4/15  bed mobility supervision  transfers CG with RW  gait CG with rW x 45 feet  standing rest     VITALS  T(C): 36.6 (04-15-24 @ 10:27), Max: 37.7 (04-14-24 @ 23:04)  HR: 85 (04-15-24 @ 10:27) (72 - 85)  BP: 108/71 (04-15-24 @ 10:27) (96/61 - 109/72)  RR: 18 (04-15-24 @ 10:27) (18 - 18)  SpO2: 97% (04-15-24 @ 10:27) (95% - 99%)  Wt(kg): --    MEDICATIONS   atorvastatin 20 milliGRAM(s) at bedtime  bacitracin   Ointment 1 Application(s) daily PRN  enoxaparin Injectable 40 milliGRAM(s) every 24 hours  gabapentin 300 milliGRAM(s) every 8 hours  HYDROmorphone  Injectable 0.5 milliGRAM(s) every 4 hours PRN  HYDROmorphone  Injectable 0.25 milliGRAM(s) four times a day PRN  lacosamide 150 milliGRAM(s) two times a day  meclizine 12.5 milliGRAM(s) three times a day PRN  multivitamin 1 Tablet(s) daily  polyethylene glycol 3350 17 Gram(s) daily  senna 2 Tablet(s) at bedtime  tamsulosin 0.4 milliGRAM(s) at bedtime      RECENT LABS - Reviewed                        10.0   11.61 )-----------( 403      ( 15 Apr 2024 12:37 )             30.0           -----------------------------------------------------------------------------  PHYSICAL EXAM  Constitutional - NAD, Comfortable, in chair   Chest - Breathing comfortably room air   Cardiovascular - S1S2   Abdomen - Soft   Extremities -Left thigh dressing in place  Neurologic Exam -   follows commands                 Cognitive - Awake, Alert, AAO to self, place, date, year, situation     Communication - min word finding difficulty        Motor -                     LEFT    UE - ShAB 5/5, EF 5/5, EE 5/5, WE 5/5,  5/5                    RIGHT UE - ShAB 5/5, EF 5/5, EE 5/5, WE 5/5,  5/5                    LEFT    LE - DF 3/5, PF 3/5                    RIGHT LE - HF 5/5, KE 5/5, DF 5/5, PF 5/5        Sensory - Intact to LT     Psychiatric - Mood stable, Affect WNL  ----------------------------------------------------------------------------------------  ASSESSMENT/PLAN  53yMale h/o CVA, aphasia, seizures, DVT, left LE fasciotomy for compartment syndrome with functional deficits after left/right thigh hematoma, s/p hematoma evacuation, with debility   anemia, s/p transfusion, continue to monitor   seizures, on lacosamide  bowel regimen   Pain - Tylenol, dilaudid, gabapentin   DVT PPX - SCDs  Rehab - Will continue to follow for ongoing rehab needs and recommendations.   continue bedside therapy  out of bed to chair isha    Recommend ACUTE inpatient rehabilitation for the functional deficits consisting of 3 hours of therapy/day x 2 weeks, 24 hour RN/daily PMR physician for comorbid medical management. Patient will be able to tolerate 3 hours a day.

## 2024-04-15 NOTE — PROGRESS NOTE ADULT - ASSESSMENT
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 On admission, vitals significant for hypotension and labs showing anemia with CTA showing large hematoma of the LLE and small RLE hematoma with repeat CTA showing interval increase of the hematoma and new small LLE hematoma s/p 2units pRBCs. He is now admitted to the MICU for further management.       # LE Hematoma, Anemia   - 4/4 CT A w/ Mod- large SubC hematoma in the anteromedial L upper thigh containing scattered foci of high attenuation concerning for active hemorrhage within the hematoma; Small collection in the anterior upper right thigh suspicious for a hematoma. Mild subcutaneous inflammatory change in the visualized anterior and lateral upper right thigh with associated foci of subcutaneous gas.   - 4/5 CT w/ Interval increase in size of an anteromedial L upper thigh subc  hematoma with scattered foci of high attenuation within the hematoma compatible with active hemorrhage; New left gracilis intramuscular hematoma which could be extending from the subcutaneous hematoma.  - Duplex negative for DVT   - S/P Hematoma evacuation with Vascular on 4/5. Drains DC'd as per Vascular   - Pt with episode of worsening LLE edema 4/13, drop in Hgb. ASA and Lovenox held. S/P 2 units of PRBCs   - R/O Compartment syndrome. CT LE w/ evolving hematoma of medial L thigh, decreased in side, negative for active hemorrhage   - Lovenox 40 Qd resumed. Monitor H/H and LE closely   - Pulse checks per protocol;  Monitor LE closely  - IR, Vascular, MICU care appreciated   - Per vascular surgery    #Leukocytosis  - Noted to have elevated WBC  - likely inflammatory iso blood loss with low concern for infection  - trend WBC and fever curve  - If febrile, check pan cultures    #Seizure disorder  # CVA with residual expressive aphasia  - C/w home AEDs (lacosamide and vyanese   - C/w home gabapentin   - Seizure precautions  - Neuro eval appreciated; F/u recs     #HLD  - C/w home Lipitor    #BPH  - C/w flomax    #DVTs  - hx of bilateral DVTs --> Repeat duplex negative          Discussed with Attending. 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 On admission, vitals significant for hypotension and labs showing anemia with CTA showing large hematoma of the LLE and small RLE hematoma with repeat CTA showing interval increase of the hematoma and new small LLE hematoma s/p 2units pRBCs. He is now admitted to the MICU for further management.       # LE Hematoma, Anemia   - 4/4 CT A w/ Mod- large SubC hematoma in the anteromedial L upper thigh containing scattered foci of high attenuation concerning for active hemorrhage within the hematoma; Small collection in the anterior upper right thigh suspicious for a hematoma. Mild subcutaneous inflammatory change in the visualized anterior and lateral upper right thigh with associated foci of subcutaneous gas.   - 4/5 CT w/ Interval increase in size of an anteromedial L upper thigh subc  hematoma with scattered foci of high attenuation within the hematoma compatible with active hemorrhage; New left gracilis intramuscular hematoma which could be extending from the subcutaneous hematoma.  - Duplex negative for DVT   - S/P Hematoma evacuation with Vascular on 4/5. Drains DC'd as per Vascular   - Pt with episode of worsening LLE edema 4/13, drop in Hgb. ASA and Lovenox held. S/P 2 units of PRBCs   - R/O Compartment syndrome. CT LE w/ evolving hematoma of medial L thigh, decreased in side, negative for active hemorrhage   - Lovenox 40 Qd resumed. Monitor H/H and LE closely. Outpatient follow up for further titration as tolerated    - Pulse checks per protocol;  Monitor LE closely  - IR, Vascular, MICU care appreciated   - Plastic surgery eval appreciated; F/u recs --> wound is not yet amenable to reconstitution. Outpatient PSx follow up upon DC   - Per vascular surgery    #Leukocytosis  - Noted to have elevated WBC  - likely inflammatory iso blood loss with low concern for infection  - trend WBC and fever curve  - If febrile, check pan cultures    #Seizure disorder  # CVA with residual expressive aphasia  - C/w home AEDs (lacosamide and vyanese   - C/w home gabapentin   - Seizure precautions  - Neuro eval appreciated; F/u recs     #HLD  - C/w home Lipitor    #BPH  - C/w flomax    #DVTs  - hx of bilateral DVTs --> Repeat duplex negative          Discussed with Attending.

## 2024-04-16 LAB
ALBUMIN SERPL ELPH-MCNC: 3.1 G/DL — LOW (ref 3.3–5)
ALP SERPL-CCNC: 95 U/L — SIGNIFICANT CHANGE UP (ref 40–120)
ALT FLD-CCNC: 24 U/L — SIGNIFICANT CHANGE UP (ref 10–45)
ANION GAP SERPL CALC-SCNC: 12 MMOL/L — SIGNIFICANT CHANGE UP (ref 5–17)
AST SERPL-CCNC: 15 U/L — SIGNIFICANT CHANGE UP (ref 10–40)
BASOPHILS # BLD AUTO: 0.07 K/UL — SIGNIFICANT CHANGE UP (ref 0–0.2)
BASOPHILS NFR BLD AUTO: 0.5 % — SIGNIFICANT CHANGE UP (ref 0–2)
BILIRUB SERPL-MCNC: 0.5 MG/DL — SIGNIFICANT CHANGE UP (ref 0.2–1.2)
BUN SERPL-MCNC: 14 MG/DL — SIGNIFICANT CHANGE UP (ref 7–23)
CALCIUM SERPL-MCNC: 8.2 MG/DL — LOW (ref 8.4–10.5)
CHLORIDE SERPL-SCNC: 103 MMOL/L — SIGNIFICANT CHANGE UP (ref 96–108)
CO2 SERPL-SCNC: 21 MMOL/L — LOW (ref 22–31)
CREAT SERPL-MCNC: 0.92 MG/DL — SIGNIFICANT CHANGE UP (ref 0.5–1.3)
EGFR: 99 ML/MIN/1.73M2 — SIGNIFICANT CHANGE UP
EOSINOPHIL # BLD AUTO: 0.32 K/UL — SIGNIFICANT CHANGE UP (ref 0–0.5)
EOSINOPHIL NFR BLD AUTO: 2.4 % — SIGNIFICANT CHANGE UP (ref 0–6)
GLUCOSE SERPL-MCNC: 96 MG/DL — SIGNIFICANT CHANGE UP (ref 70–99)
HCT VFR BLD CALC: 29.5 % — LOW (ref 39–50)
HGB BLD-MCNC: 9.5 G/DL — LOW (ref 13–17)
IMM GRANULOCYTES NFR BLD AUTO: 1.5 % — HIGH (ref 0–0.9)
LYMPHOCYTES # BLD AUTO: 1.02 K/UL — SIGNIFICANT CHANGE UP (ref 1–3.3)
LYMPHOCYTES # BLD AUTO: 7.5 % — LOW (ref 13–44)
MAGNESIUM SERPL-MCNC: 2.2 MG/DL — SIGNIFICANT CHANGE UP (ref 1.6–2.6)
MCHC RBC-ENTMCNC: 28.4 PG — SIGNIFICANT CHANGE UP (ref 27–34)
MCHC RBC-ENTMCNC: 32.2 GM/DL — SIGNIFICANT CHANGE UP (ref 32–36)
MCV RBC AUTO: 88.3 FL — SIGNIFICANT CHANGE UP (ref 80–100)
MONOCYTES # BLD AUTO: 1.41 K/UL — HIGH (ref 0–0.9)
MONOCYTES NFR BLD AUTO: 10.4 % — SIGNIFICANT CHANGE UP (ref 2–14)
NEUTROPHILS # BLD AUTO: 10.55 K/UL — HIGH (ref 1.8–7.4)
NEUTROPHILS NFR BLD AUTO: 77.7 % — HIGH (ref 43–77)
NRBC # BLD: 0 /100 WBCS — SIGNIFICANT CHANGE UP (ref 0–0)
PHOSPHATE SERPL-MCNC: 3.6 MG/DL — SIGNIFICANT CHANGE UP (ref 2.5–4.5)
PLATELET # BLD AUTO: 409 K/UL — HIGH (ref 150–400)
POTASSIUM SERPL-MCNC: 4 MMOL/L — SIGNIFICANT CHANGE UP (ref 3.5–5.3)
POTASSIUM SERPL-SCNC: 4 MMOL/L — SIGNIFICANT CHANGE UP (ref 3.5–5.3)
PROT SERPL-MCNC: 6.6 G/DL — SIGNIFICANT CHANGE UP (ref 6–8.3)
RBC # BLD: 3.34 M/UL — LOW (ref 4.2–5.8)
RBC # FLD: 14.3 % — SIGNIFICANT CHANGE UP (ref 10.3–14.5)
SODIUM SERPL-SCNC: 136 MMOL/L — SIGNIFICANT CHANGE UP (ref 135–145)
WBC # BLD: 13.57 K/UL — HIGH (ref 3.8–10.5)
WBC # FLD AUTO: 13.57 K/UL — HIGH (ref 3.8–10.5)

## 2024-04-16 PROCEDURE — 99223 1ST HOSP IP/OBS HIGH 75: CPT

## 2024-04-16 RX ORDER — OXYCODONE HYDROCHLORIDE 5 MG/1
5 TABLET ORAL EVERY 6 HOURS
Refills: 0 | Status: DISCONTINUED | OUTPATIENT
Start: 2024-04-16 | End: 2024-04-18

## 2024-04-16 RX ADMIN — ATORVASTATIN CALCIUM 20 MILLIGRAM(S): 80 TABLET, FILM COATED ORAL at 21:07

## 2024-04-16 RX ADMIN — Medication 1 TABLET(S): at 11:14

## 2024-04-16 RX ADMIN — HYDROMORPHONE HYDROCHLORIDE 0.5 MILLIGRAM(S): 2 INJECTION INTRAMUSCULAR; INTRAVENOUS; SUBCUTANEOUS at 06:03

## 2024-04-16 RX ADMIN — HYDROMORPHONE HYDROCHLORIDE 0.5 MILLIGRAM(S): 2 INJECTION INTRAMUSCULAR; INTRAVENOUS; SUBCUTANEOUS at 05:47

## 2024-04-16 RX ADMIN — GABAPENTIN 300 MILLIGRAM(S): 400 CAPSULE ORAL at 21:07

## 2024-04-16 RX ADMIN — ENOXAPARIN SODIUM 40 MILLIGRAM(S): 100 INJECTION SUBCUTANEOUS at 14:12

## 2024-04-16 RX ADMIN — TAMSULOSIN HYDROCHLORIDE 0.4 MILLIGRAM(S): 0.4 CAPSULE ORAL at 21:09

## 2024-04-16 RX ADMIN — LACOSAMIDE 150 MILLIGRAM(S): 50 TABLET ORAL at 05:22

## 2024-04-16 RX ADMIN — GABAPENTIN 300 MILLIGRAM(S): 400 CAPSULE ORAL at 14:12

## 2024-04-16 RX ADMIN — POLYETHYLENE GLYCOL 3350 17 GRAM(S): 17 POWDER, FOR SOLUTION ORAL at 11:15

## 2024-04-16 RX ADMIN — GABAPENTIN 300 MILLIGRAM(S): 400 CAPSULE ORAL at 05:22

## 2024-04-16 RX ADMIN — SENNA PLUS 2 TABLET(S): 8.6 TABLET ORAL at 21:07

## 2024-04-16 RX ADMIN — LACOSAMIDE 150 MILLIGRAM(S): 50 TABLET ORAL at 17:24

## 2024-04-16 NOTE — PROGRESS NOTE ADULT - SUBJECTIVE AND OBJECTIVE BOX
Neurology        S: patient seen.   AC/AP held       Medications: MEDICATIONS  (STANDING):  atorvastatin 20 milliGRAM(s) Oral at bedtime  enoxaparin Injectable 40 milliGRAM(s) SubCutaneous every 24 hours  gabapentin 300 milliGRAM(s) Oral every 8 hours  lacosamide 150 milliGRAM(s) Oral two times a day  multivitamin 1 Tablet(s) Oral daily  polyethylene glycol 3350 17 Gram(s) Oral daily  senna 2 Tablet(s) Oral at bedtime  tamsulosin 0.4 milliGRAM(s) Oral at bedtime    MEDICATIONS  (PRN):  bacitracin   Ointment 1 Application(s) Topical daily PRN daily dressing change  HYDROmorphone  Injectable 0.5 milliGRAM(s) IV Push every 4 hours PRN Severe Pain (7 - 10)  HYDROmorphone  Injectable 0.25 milliGRAM(s) IV Push four times a day PRN Moderate Pain (4 - 6)  meclizine 12.5 milliGRAM(s) Oral three times a day PRN Dizziness       Vitals:  Vital Signs Last 24 Hrs  T(C): 36.8 (16 Apr 2024 08:45), Max: 37.3 (16 Apr 2024 04:00)  T(F): 98.3 (16 Apr 2024 08:45), Max: 99.1 (16 Apr 2024 04:00)  HR: 72 (16 Apr 2024 08:45) (70 - 85)  BP: 103/69 (16 Apr 2024 08:45) (94/57 - 110/71)  BP(mean): --  RR: 18 (16 Apr 2024 08:45) (17 - 18)  SpO2: 98% (16 Apr 2024 08:45) (95% - 100%)    Parameters below as of 16 Apr 2024 08:45  Patient On (Oxygen Delivery Method): room air            General Exam:   General Appearance: Appropriately dressed and in no acute distress       Head: Normocephalic, atraumatic and no dysmorphic features  Ear, Nose, and Throat: Moist mucous membranes  CVS: S1S2+  Resp: No SOB, no wheeze or rhonchi  GI: soft NT/ND  Extremities: No edema or cyanosis L leg thigh hematoma   Skin: No bruises or rashes     Neurological Exam:  Mental Status: Awake, alert and oriented x 3 minimal WFD .  Able to follow simple and complex verbal commands. Able to name and repeat. fluent speech. No obvious aphasia or dysarthria noted.   Cranial Nerves: PERRL, EOMI, VFFC, sensation V1-V3 intact,  no obvious facial asymmetry, equal elevation of palate, scm/trap 5/5, tongue is midline on protrusion. no obvious papilledema on fundoscopic exam. hearing is grossly intact.   Motor: Normal bulk, tone and strength throughout. Fine finger movements were intact and symmetric. no tremors or drift noted.    Sensation: Intact to light touch and pinprick throughout. no right/left confusion. no extinction to tactile on DSS.  .   Reflexes: 1+ throughout at biceps, brachioradialis, triceps, patellars and ankles bilaterally and equal. No clonus. R toe and L toe were both downgoing.  Coordination: No dysmetria on FNF    Gait: deferred     Data/Labs/Imaging which I personally reviewed.       LABS:                          9.5    13.57 )-----------( 409      ( 16 Apr 2024 07:00 )             29.5     04-16    136  |  103  |  14  ----------------------------<  96  4.0   |  21<L>  |  0.92    Ca    8.2<L>      16 Apr 2024 07:00  Phos  3.6     04-16  Mg     2.2     04-16    TPro  6.6  /  Alb  3.1<L>  /  TBili  0.5  /  DBili  x   /  AST  15  /  ALT  24  /  AlkPhos  95  04-16    LIVER FUNCTIONS - ( 16 Apr 2024 07:00 )  Alb: 3.1 g/dL / Pro: 6.6 g/dL / ALK PHOS: 95 U/L / ALT: 24 U/L / AST: 15 U/L / GGT: x             Urinalysis Basic - ( 16 Apr 2024 07:00 )    Color: x / Appearance: x / SG: x / pH: x  Gluc: 96 mg/dL / Ketone: x  / Bili: x / Urobili: x   Blood: x / Protein: x / Nitrite: x   Leuk Esterase: x / RBC: x / WBC x   Sq Epi: x / Non Sq Epi: x / Bacteria: x

## 2024-04-16 NOTE — PROGRESS NOTE ADULT - ASSESSMENT
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 On admission, vitals significant for hypotension and labs showing anemia with CTA showing large hematoma of the LLE and small RLE hematoma with repeat CTA showing interval increase of the hematoma and new small LLE hematoma s/p 2units pRBCs. He is now admitted to the MICU for further management.       # LE Hematoma, Anemia   - 4/4 CT A w/ Mod- large SubC hematoma in the anteromedial L upper thigh containing scattered foci of high attenuation concerning for active hemorrhage within the hematoma; Small collection in the anterior upper right thigh suspicious for a hematoma. Mild subcutaneous inflammatory change in the visualized anterior and lateral upper right thigh with associated foci of subcutaneous gas.   - 4/5 CT w/ Interval increase in size of an anteromedial L upper thigh subc  hematoma with scattered foci of high attenuation within the hematoma compatible with active hemorrhage; New left gracilis intramuscular hematoma which could be extending from the subcutaneous hematoma.  - Duplex negative for DVT   - S/P Hematoma evacuation with Vascular on 4/5. Drains DC'd as per Vascular   - Pt with episode of worsening LLE edema 4/13, drop in Hgb. ASA and Lovenox held. S/P 2 units of PRBCs   - R/O Compartment syndrome. CT LE w/ evolving hematoma of medial L thigh, decreased in side, negative for active hemorrhage   - Lovenox 40 Qd resumed. Monitor H/H and LE closely. Monitor for bleeding. Outpatient follow up for further titration as tolerated . Outpatient repeat CBC, if stable patient to follow up for consideration of resumption on ASA on 4-5 days outpatient   - Pulse checks per protocol;  Monitor LE closely  - IR, Vascular, MICU care appreciated   - Plastic surgery eval appreciated; F/u recs --> wound is not yet amenable to reconstitution. Outpatient PSx follow up upon DC   - Per vascular surgery    #Leukocytosis  - Noted to have elevated WBC;  Likely inflammatory iso blood loss with low concern for infection  - Trend WBC and fever curve, If febrile, check pan cultures  - Check CBC outpatient    #Seizure disorder  # CVA with residual expressive aphasia  - C/w home AEDs (lacosamide and vyanese)  - C/w home gabapentin   - Seizure precautions  - Neuro eval appreciated; F/u recs     #HLD  - C/w home Lipitor    #BPH  - C/w flomax    #DVTs  - hx of bilateral DVTs --> Repeat duplex negative         Discussed with Attending and ACP.   DC planning

## 2024-04-16 NOTE — PROGRESS NOTE ADULT - ASSESSMENT
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 Noticed within the last hour. No falls or trauma to the area. Administered Lovenox just prior to arrival. Denies fevers, chills, nausea, vomiting, urinary symptoms. No recent surgeries  found left thigh hematoma   s/p 2 units of PRBC   s/p hematoma evacuation 4/5, now doing well  s/p PRBC again     Impression:   1) chronic strokes, resid minimal WFD  2) seizure d/o 2/2 strokes  3) ADHD  4) vertigo BPPV, stable   5) new L thigh hematoma   6) LLE compartment syndrome     - monitor H/H.  at the least needs to be on ASA.  would consider starting ASA 81mg in ~3-5 days at rehab and monitor H/H to make sure its not dropping   - monitor for compartment syndrome   - Lovenox full dose now held.  was injecting in thigh at site of hematoma ; last admission Lovenox started for DVT and changed to BID dosing   - for ADHD gets Vyvanse 50mg daily  - for seizure he is on vimpat 150 mg BID  - for secondary stroke prevention should be on asa 81mg daily and full dose lovenox . now held   - statin therapy with LDL goal < 70mg/dL; atorvastatin 20 at home   - meclizline PRN   - PT/OT   - check FS, glucose control <180  - GI/DVT ppx   - Thank you for allowing me to participate in the care of this patient. Call with questions.   - back on Lovenox 40mg   -dc planning [place] to ping Forde MD  Vascular Neurology  Office: [phone]

## 2024-04-16 NOTE — CONSULT NOTE ADULT - NS_MD_PANP_GEN_ALL_CORE
Your Child's Health  18-Month-Old Visit    Pebbles MOREIRA Lester  June 8, 2018    Visit Vitals  Temp 98.7 °F (37.1 °C) (Temporal Artery)   Ht 31\" (78.7 cm)   Wt 9.96 kg   HC 46.5 cm (18.31\")   BMI 16.06 kg/m²     Weight: 21.96 lbs    NUTRITION:  Avoid snacks that cause cavities, including chewy fruit snacks and sugared cereals. A multivitamin with 400 units of vitamin D should be given to all children who drink less than 32 ounces of milk per day.     Obesity and being overweight are serious problems in the [place]. You can help your child avoid these problems by following these guidelines:  · Limit TV and video total time to 2 hours per day or less.  · Do not encourage your children to eat if they tell you they are full. Healthy children will not starve themselves.  · Do not reward your children for cleaning their plates. If they always leave food, reduce the size of the portions and tell them to ask for more if they are still hungry.  · Do not allow children to dish out their own portions. They will imitate adults or imitate what they have seen on TV; in both cases, the amount will be too large. This results in increased calories and increased waste. For children above 5 years of age and for adults, people eat more when given larger portions.    Bottle feeding past one year of age has been associated with an increase in tooth decay. Allowing the child to carry a bottle around and sip it is particularly harmful to teeth.  Although most dentists prefer not to see children until 3 years of age, ask your dentist's office for advice. Cleaning with a rag or soft brush is recommended by most dentists.    DEVELOPMENT:  Children have many physical accomplishments at this age. They can usually walk, climb, drink from a cup, remove clothes, and initiate physical tasks around the house.    LANGUAGE:  Most children at this age can say only a few words. They will begin to acquire language mainly in the months ahead. Pebbles  may imitate car or animal noises before she starts to say more \"traditional\" words. You can assist Pebbles's language development in many ways:  · Name the objects in the immediate area.  · Name body parts.  · Give Pebbles words for actions like \"jump,\" \"eat,\" \"drink,\" and \"run.\"  · Talk with Pebbles and listen to what she is trying to say.  · Read to Pebbles. Even naming the objects in magazine ads will help.    TOILET TRAINING:  Readiness for toilet training is sometimes signaled by dryness after naps and the child's demands to be changed as soon as he or she is wet. Some children express an interest as soon as 18 months and it is OK to encourage them after that age. The average age for training in the [place], however, is closer to 3 years. This means that half of all children train later. The most effective training methods involve praise and small rewards such as raisins or M&M's. When Pebbles is older (past 4), special \"grownup\" pants may be an incentive.  If she is totally disinterested or resistant, it may be best to put training off for a few months; however, you should continue to say, \"Soon, when you are big, you will go on the potty\" whenever you change her.    TEMPER TANTRUMS:  These begin about the time a child starts walking and can continue to be a problem until the child is 3-1/2 years old or older. The most effective method of dealing with tantrums is to ignore them and to pay more attention and give more praise when the child is behaving well. Time-outs can be effective at this age.    CAR SAFETY:  An approved car seat in the back seat of the car is required by [place] law until your child is 4 years old and weighs at least 40 pounds.  All infants and toddlers should ride in a rear-facing car seat until 2 years of age or until they reach the highest weight or height allowed by their car seats  per recommendation of the American Academy of Pediatrics.    ACCIDENT PREVENTION:  · Kitchen:   Keep dangerous items out of reach, including hot liquids, hot foods, and electrical cords on appliances such as irons, toasters, and coffee pots.  · Upstairs Windows:  Use locks or guards.  · Bathtubs:  Do not leave Pebbles alone in the tub. Even babies who have had swimming classes are not safe alone in the tub at this age. Children have even drowned in buckets of water.  · Water Safety:  Empty any buckets of water. Fence off permanent pools and drain wading pools when not in use. Keep the toilet lid down.    SMOKING:  Children exposed to tobacco smoke have more ear infections and pneumonia.  Cold symptoms last longer.  If you smoke, please quit.  If you cannot quit, smoke outside.  Do not smoke near Pebbles, and do not let others smoke near her.    LEAD EXPOSURE:  If there is lead in the house, Pebbles may be vulnerable.    Let us know if any of the following apply:  · Your home was built before 1960 and has peeling or chipping or chalking paint. Homes built in older cities at the turn of the last century may have lead pipes. Check to see if any of the above applies to Pebbles's sitter's home,  , , or any other house where she spends time.  · You may have other sources of lead in the home, including:  (a) herbal remedies like marlon, tosin, ghasard, kandu, azarcon, pay-loo-ah, or balagoli; (b) antique toys, especially those made of lead or pewter; (c) imported mini blinds; and (d) imported canned goods, especially acidic foods like tomato and citrus. Do not cook with or store foods in utensils made of crystal, pewter, or imported pottery.  · You have another child or housemate who is being followed or treated for an elevated lead level.  · Pebbles lives with an adult whose job or hobby involves lead exposure (soldering, battery manufacture, recycling, casting, stained glass, etc.).  · You live near an active lead smelter, a battery recycling plant, or a plant that processes hot metal.    TUBERCULOSIS:  There  is such a low rate of tuberculosis in our area that frequent skin tests are no longer recommended. However, certain situations do increase Pebbles's risk, including contact with AIDS patients, nursing home residents, prisoners, immigrants, or homeless persons. Please let us know if Pebbles has contacts with such persons, or if she has contact with anyone known to have or suspected of having tuberculosis.    SUN EXPOSURE:  If you plan to have Pebbles outside for more than 30 minutes, please follow the guidelines in our Sun Exposure handout.    MEDICATION FOR FEVER OR PAIN:   Acetaminophen liquid (e.g., Tylenol or Tempra) may be given every four hours as needed for pain or fever.  Acetaminophen liquid is less concentrated than the infant dropper bottle type.  Be sure to check which product CONCENTRATION you are using.    INFANT Tylenol/Acetaminophen  Drops (160 mg/5 mL)    Child’s Weight: Dose:  24 - 35 pounds:   160 mg (5.0 mL (1 Teaspoon))    CHILDREN’S Tylenol/Acetaminophen  (160 mg/5 mL)    Child’s Weight: Dose:  24 - 35 pounds:   160 mg (5.0 mL (1 Teaspoon))    CHILDREN'S Ibuprofen (100 mg/5 mL) liquid (for example, Advil or Motrin) may be given every 6 hours as needed for pain or fever.    Child’s Weight: Dose:  24 - 35 pounds:   100 mg (5.0 mL(1Teaspoon))    If Pebbles is outside this weight range, call your pediatrician's office for advice.        Most Recent Immunizations   Administered Date(s) Administered   • DTaP, 5 Pertussis Antigens (DAPTACEL) 03/30/2018   • DTaP/HIB/IPV 05/31/2017   • Hep A, ped/adol, 2 dose 03/09/2018   • Hep B, adolescent or pediatric 05/31/2017   • Hib (PRP-T) 12/08/2017   • Influenza, injectable, quadrivalent, preservative free, pediatric 11/03/2017   • Influenza, injectable, quadrivalent, preservative-free 09/29/2017   • MMR 12/08/2017   • Pneumococcal Conjugate 13 valent 03/30/2018   • Rotavirus - pentavalent 05/31/2017   • Varicella 12/08/2017       If Pebbles develops any of the following  reactions within 72 hours after an immunization, notify your pediatrician by calling the pediatric phone nurse:  · A temperature of 105 degrees or above.  · More than 3 hours of continuous crying.  · A shrill, high-pitched cry.  · A pale, limp spell.  · A seizure or fainting spell. In this case, you should call 911 or go immediately to the emergency room.      NEXT VISIT:  2 YEARS OF AGE    Thank you for entrusting your care to [facility].   Attending and PA/NP shared services statement (NON-critical care):

## 2024-04-16 NOTE — CHART NOTE - NSCHARTNOTEFT_GEN_A_CORE
As per vascular ( jailyn) pt with a new hematoma in left leg , needs to be closely monitored , advised to hold lovenox. Dr galindo informed.

## 2024-04-16 NOTE — CONSULT NOTE ADULT - SUBJECTIVE AND OBJECTIVE BOX
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 On admission, vitals significant for hypotension and labs showing anemia with CTA showing large hematoma of the LLE and small RLE hematoma. Hematoma evacuation with Vascular on 4/5. Patient now has worsening LLE edema 4/13, drop in Hgb. ASA and Lovenox held. S/P 2 units of PRBCs, suspicious for hematoma expansion.  No new imaging since 04/13. IR consulted for possible hematoma drainage.         Allergies: No Known Allergies    Medications (Abx/Cardiac/Anticoagulation/Blood Products)    enoxaparin Injectable: 40 milliGRAM(s) SubCutaneous (04-16 @ 14:12)    Data:    T(C): 36.8  HR: 72  BP: 103/69  RR: 18  SpO2: 98%    -WBC 13.57 / HgB 9.5 / Hct 29.5 / Plt 409  -Na 136 / Cl 103 / BUN 14 / Glucose 96  -K 4.0 / CO2 21 / Cr 0.92  -ALT 24 / Alk Phos 95 / T.Bili 0.5  -INR 0.89 / PTT 26.3      Radiology:   CT Pelvis 04/13/24 reviewed.    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 On admission, vitals significant for hypotension and labs showing anemia with CTA showing large hematoma of the LLE and small RLE hematoma. Hematoma evacuation with Vascular on 4/5. Patient now has worsening LLE edema 4/13, drop in Hgb. ASA and Lovenox held. S/P 2 units of PRBCs, suspicious for hematoma expansion.  No new imaging since 04/13. IR consulted for possible hematoma drainage.       -- CT 04/13/24 reviewed - no concern for active extravasation or concern for infection.  -- Hematoma does not appear to be confined by fascial plane with low risk for compartment syndrome; drainage may result in reaccumulation of hematoma.   -- Recommend conservative management at this time       --  Fito Mccabe, DO  PGY-2 Vascular and Interventional Radiology Resident   Available on [employer] Teams    - Non-emergent consults: Place IR consult order in [place]  - Emergent issues (pager): [facility] [phone]; [facility] [phone]; [phone]  - Scheduling questions: [facility] [phone]; [facility] [phone]  - Clinic/outpatient booking: [facility] [phone]; [facility] [phone]

## 2024-04-16 NOTE — CONSULT NOTE ADULT - SUBJECTIVE AND OBJECTIVE BOX
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 Otherwise, there was no trauma to the area nor falls. The pain is described as throbbing and does not radiate. He did not try any supportive measures and came to the hospital directly. Of note, the patient was on eliquis when he was found to have bilateral DVTs, however this was changed to lovenox when he had a CVA while on eliquis.    On admission, the patient's vitals were noted to be within normal limits, however he then became hypotensive for which he received 2u pRBCs and protamine sulfate. His labs were significant for anemia and leukocytosis and a CTA showed large hematoma in the LLE and small hematoma of the RLE.  Hematoma evacuation with Vascular on 4/5. Patient now has worsening LLE edema 4/13, drop in Hgb. ASA and Lovenox held. S/P 2 units of PRBCs, suspicious for hematoma expansion.  No new imaging since 04/13. IR consulted for possible hematoma drainage.     Wound consult requested by team to assist w/ management of Lt thigh wound s/p evacuation of hematoma.  Requested by Vascular to assist w/ options for wound healing.  Pt was seen by our team s/p fasciotomy which is now healed. Currently pt w/o c/o pain, excess drainage, odor, color change, or worsening swelling.  Pt tolerating dressings.  Pt well known to wound center attng.   Offloading and pericare initiated upon admission as pt sedentary 2/2 to illness. No other h/o bites, scratches, falls, trauma.  Appetite good w/o weight loss.  All questions asked and answered to pt's expressed understanding and satisfaction.      Current Diet: Diet, Regular (04-05-24 @ 14:50)      PAST MEDICAL & SURGICAL HISTORY:  TIAs    CVA (cerebrovascular accident)    HLD (hyperlipidemia)    Vertigo    LLE Compartment Syndrome s/p fasciotomy    PFO/ ASD        REVIEW OF SYSTEMS: General/ Skin/Vasc/ Neuro: see HPI  All other systems negative    MEDICATIONS  (STANDING):  atorvastatin 20 milliGRAM(s) Oral at bedtime  gabapentin 300 milliGRAM(s) Oral every 8 hours  lacosamide 150 milliGRAM(s) Oral two times a day  multivitamin 1 Tablet(s) Oral daily  polyethylene glycol 3350 17 Gram(s) Oral daily  senna 2 Tablet(s) Oral at bedtime  tamsulosin 0.4 milliGRAM(s) Oral at bedtime    MEDICATIONS  (PRN):  bacitracin   Ointment 1 Application(s) Topical daily PRN daily dressing change  meclizine 12.5 milliGRAM(s) Oral three times a day PRN Dizziness  oxyCODONE    IR 5 milliGRAM(s) Oral every 6 hours PRN Moderate Pain (4 - 6)    No Known Allergies    SOCIAL HISTORY:  SOCIAL HISTORY:  single/lives in SNF;  Denies smoking, ETOH, drugs    FAMILY HISTORY:No pertinent family history in first degree relatives      PHYSICAL EXAM:  Vital Signs Last 24 Hrs  T(C): 36.6 (16 Apr 2024 16:13), Max: 37.3 (16 Apr 2024 04:00)  T(F): 97.8 (16 Apr 2024 16:13), Max: 99.1 (16 Apr 2024 04:00)  HR: 70 (16 Apr 2024 16:13) (70 - 74)  BP: 106/68 (16 Apr 2024 16:13) (94/57 - 110/71)  BP(mean): --  RR: 17 (16 Apr 2024 16:13) (17 - 18)  SpO2: 98% (16 Apr 2024 16:13) (95% - 100%)    Parameters below as of 16 Apr 2024 16:13  Patient On (Oxygen Delivery Method): room air    NAD, A&Ox3, WD/ WN/ WG   Versa Care P500 bed  HEENT:  NC/AT, EOMI, sclera clear, mucosa moist, throat clear, trachea midline, neck supple  Respiratory: nonlabored w/ equal chest rise  Gastrointestinal: soft NT/ND  Neurology:  weakened strength & sensation grossly intact       LLE w/ foot drop  Psych: calm/ appropriate  Musculoskeletal: FROM, no deformities/ contractures  Vascular: BLE equally warm,  no cyanosis, clubbing, nor acute ischemia       LLE edema - well healed fasciotomy incision       Lt thigh incision intact w/ sutures  epidermolysis easily cleaned w/ moistened gauze          2 areas along suture line w/ soft grey tissue           superior edge indurated           inferior edge soft w/ fluctuance         periwound w/ clear intact blistering        (+) serosanguinous drainage  No odor, erythema, increased warmth, tenderness, induration, fluctuance, nor crepitus  Skin:  moist w/ good turgor  Buttocks clean intact skin w/o blistering or drainage  No odor, erythema, increased warmth, tenderness, induration, fluctuance, nor crepitus    LABS/ CULTURES/ RADIOLOGY:                        9.5    13.57 )-----------( 409      ( 16 Apr 2024 07:00 )             29.5       136  |  103  |  14  ----------------------------<  96      [04-16-24 @ 07:00]  4.0   |  21  |  0.92        Ca     8.2     [04-16-24 @ 07:00]      Mg     2.2     [04-16-24 @ 07:00]      Phos  3.6     [04-16-24 @ 07:00]    TPro  6.6  /  Alb  3.1  /  TBili  0.5  /  DBili  x   /  AST  15  /  ALT  24  /  AlkPhos  95  [04-16-24 @ 07:00]     Wound SURGERY CONSULT NOTE    HPI:  Patient is a 54 yo M with hx of CVA with residual expressive aphasia, 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 Otherwise, there was no trauma to the area nor falls. The pain is described as throbbing and does not radiate. He did not try any supportive measures and came to the hospital directly. Of note, the patient was on eliquis when he was found to have bilateral DVTs, however this was changed to lovenox when he had a CVA while on eliquis.    On admission, the patient's vitals were noted to be within normal limits, however he then became hypotensive for which he received 2u pRBCs and protamine sulfate. His labs were significant for anemia and leukocytosis and a CTA showed large hematoma in the LLE and small hematoma of the RLE.  Hematoma evacuation with Vascular on 4/5. Patient now has worsening LLE edema 4/13, drop in Hgb. ASA and Lovenox held. S/P 2 units of PRBCs, suspicious for hematoma expansion.  No new imaging since 04/13. IR consulted for possible hematoma drainage.     Wound consult requested by team to assist w/ management of Lt thigh wound s/p evacuation of hematoma.  Requested by Vascular to assist w/ options for wound healing.  Pt was seen by our team s/p fasciotomy which is now healed. Currently pt w/o c/o pain, excess drainage, odor, color change, or worsening swelling.  Pt tolerating dressings.  Pt well known to wound center attng.   Offloading and pericare initiated upon admission as pt sedentary 2/2 to illness. No other h/o bites, scratches, falls, trauma.  Appetite good w/o weight loss.  All questions asked and answered to pt's expressed understanding and satisfaction.      Current Diet: Diet, Regular (04-05-24 @ 14:50)      PAST MEDICAL & SURGICAL HISTORY:  TIAs    CVA (cerebrovascular accident)    HLD (hyperlipidemia)    Vertigo    LLE Compartment Syndrome s/p fasciotomy    PFO/ ASD        REVIEW OF SYSTEMS: General/ Skin/Vasc/ Neuro: see HPI  All other systems negative    MEDICATIONS  (STANDING):  atorvastatin 20 milliGRAM(s) Oral at bedtime  gabapentin 300 milliGRAM(s) Oral every 8 hours  lacosamide 150 milliGRAM(s) Oral two times a day  multivitamin 1 Tablet(s) Oral daily  polyethylene glycol 3350 17 Gram(s) Oral daily  senna 2 Tablet(s) Oral at bedtime  tamsulosin 0.4 milliGRAM(s) Oral at bedtime    MEDICATIONS  (PRN):  bacitracin   Ointment 1 Application(s) Topical daily PRN daily dressing change  meclizine 12.5 milliGRAM(s) Oral three times a day PRN Dizziness  oxyCODONE    IR 5 milliGRAM(s) Oral every 6 hours PRN Moderate Pain (4 - 6)    No Known Allergies    SOCIAL HISTORY:  SOCIAL HISTORY:  single/lives in SNF;  Denies smoking, ETOH, drugs    FAMILY HISTORY:No pertinent family history in first degree relatives      PHYSICAL EXAM:  Vital Signs Last 24 Hrs  T(C): 36.6 (16 Apr 2024 16:13), Max: 37.3 (16 Apr 2024 04:00)  T(F): 97.8 (16 Apr 2024 16:13), Max: 99.1 (16 Apr 2024 04:00)  HR: 70 (16 Apr 2024 16:13) (70 - 74)  BP: 106/68 (16 Apr 2024 16:13) (94/57 - 110/71)  BP(mean): --  RR: 17 (16 Apr 2024 16:13) (17 - 18)  SpO2: 98% (16 Apr 2024 16:13) (95% - 100%)    Parameters below as of 16 Apr 2024 16:13  Patient On (Oxygen Delivery Method): room air    NAD, A&Ox3, WD/ WN/ WG   Versa Care P500 bed  HEENT:  NC/AT, EOMI, sclera clear, mucosa moist, throat clear, trachea midline, neck supple  Respiratory: nonlabored w/ equal chest rise  Gastrointestinal: soft NT/ND  Neurology:  weakened strength & sensation grossly intact       LLE w/ foot drop  Psych: calm/ appropriate  Musculoskeletal: FROM, no deformities/ contractures  Vascular: BLE equally warm,  no cyanosis, clubbing, nor acute ischemia, + BLE DP pulses palpable       LLE edema - well healed fasciotomy incision       Lt thigh incision intact w/ sutures  epidermolysis easily cleaned w/ moistened gauze          2 areas along suture line w/ soft grey tissue           superior edge indurated           inferior edge soft w/ fluctuance         periwound w/ clear intact blistering        (+) serosanguinous drainage  No odor, erythema, increased warmth, tenderness, induration, fluctuance, nor crepitus  Skin:  moist w/ good turgor  Buttocks clean intact skin w/o blistering or drainage  No odor, erythema, increased warmth, tenderness, induration, fluctuance, nor crepitus    LABS/ CULTURES/ RADIOLOGY:                        9.5    13.57 )-----------( 409      ( 16 Apr 2024 07:00 )             29.5       136  |  103  |  14  ----------------------------<  96      [04-16-24 @ 07:00]  4.0   |  21  |  0.92        Ca     8.2     [04-16-24 @ 07:00]      Mg     2.2     [04-16-24 @ 07:00]      Phos  3.6     [04-16-24 @ 07:00]    TPro  6.6  /  Alb  3.1  /  TBili  0.5  /  DBili  x   /  AST  15  /  ALT  24  /  AlkPhos  95  [04-16-24 @ 07:00]

## 2024-04-16 NOTE — CONSULT NOTE ADULT - NS ATTEND AMEND GEN_ALL_CORE FT
Pt seen and examined with ACP.  Assessment and plan reviewed and discussed.  Agree with above.  Status of wounds and recommendations d/w Vascular attending Dr. Christie.    Status of wounds and treatment recommendations d/w  pt.  All questions answered.   Pt expressed understanding.    I spent 75  minutes face to face w/ this pt of which more than 50% of the time was spent counseling & coordinating care of this pt.

## 2024-04-16 NOTE — CONSULT NOTE ADULT - ASSESSMENT
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 On admission, vitals significant for hypotension and labs showing anemia with CTA showing large hematoma of the LLE and small RLE hematoma with repeat CTA showing interval increase of the hematoma and new small LLE hematoma s/p 2units pRBCs. He is now admitted to the MICU for further management.     Wound Consult requested to assist w/ management of Lt thigh hematoma vs seroma  Contact Dermatitis Lt thigh    Lt thigh - Consider Collagenase to nonviable tissue        Adaptic + Aquacel dressing  Follow up as per Vascular for reassessment for seroma vs hematoma        Consider IR and hold anticoagulation as per medicine & Heme  LLE elevation & Compression from foot to thigh  Consider Lt thigh US vs CT  Abx per Medicine/ ID  Moisturize intact skin w/ SWEEN cream BID  Nutrition optimization         encourage high quality protein, honey/ prosource, MVI & Vit C to promote wound healing  Continue turning and positioning w/ offloading assistive devices as per protocol  Buttocks/ Sacrum Raysa BID and prn soiling        Continue w/ attends under pads and Pericare as per protocol  Waffle Cushion to chair when oob to chair  Continue w/ low air loss pressure redistribution bed surface   Care as per medicine, remain available as requested  Upon discharge f/u as outpatient at [facility] [address] [place] [address] [phone]  Seen w/ attng & RN and D/w team  Thank you for this consult  Leticia Wills PA-C CWS [id number]  Nights/ Weekends/ Holidays please call:  General Surgery Consult pager ([phone]) for emergencies  Wound PT for multilayer leg wrapping or VAC issues (x [phone])   I spent 75minutes face to face w/ this pt of which more than 50% of the time was spent counseling & coordinating care of this pt.  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 On admission, vitals significant for hypotension and labs showing anemia with CTA showing large hematoma of the LLE and small RLE hematoma with repeat CTA showing interval increase of the hematoma and new small LLE hematoma s/p 2units pRBCs. He is now admitted to the MICU for further management.     Wound Consult requested to assist w/ management of:  Lt thigh hematoma vs seroma  Contact Dermatitis Lt thigh    Lt thigh - Consider Collagenase to nonviable tissue        Adaptic + Aquacel dressing  Follow up as per Vascular for reassessment for seroma vs hematoma        Consider IR and hold anticoagulation as per medicine & Heme  LLE elevation & Compression from foot to thigh  Consider Lt thigh US vs CT  Abx per Medicine/ ID  Moisturize intact skin w/ SWEEN cream BID  Nutrition optimization         encourage high quality protein, honey/ prosource, MVI & Vit C to promote wound healing  Continue turning and positioning w/ offloading assistive devices as per protocol  Buttocks/ Sacrum Raysa BID and prn soiling        Continue w/ attends under pads and Pericare as per protocol  Waffle Cushion to chair when oob to chair  Continue w/ low air loss pressure redistribution bed surface   Care as per medicine, remain available as requested  Upon discharge f/u as outpatient at [facility] [address] [place] [address] [phone]  Seen w/ attng & RN and D/w team  Thank you for this consult  Leticia Wills PA-C CWS [id number]  Nights/ Weekends/ Holidays please call:  General Surgery Consult pager ([phone]) for emergencies  Wound PT for multilayer leg wrapping or VAC issues (x [phone])

## 2024-04-17 LAB
HCT VFR BLD CALC: 29.6 % — LOW (ref 39–50)
HGB BLD-MCNC: 9.6 G/DL — LOW (ref 13–17)
MCHC RBC-ENTMCNC: 28.7 PG — SIGNIFICANT CHANGE UP (ref 27–34)
MCHC RBC-ENTMCNC: 32.4 GM/DL — SIGNIFICANT CHANGE UP (ref 32–36)
MCV RBC AUTO: 88.4 FL — SIGNIFICANT CHANGE UP (ref 80–100)
NRBC # BLD: 0 /100 WBCS — SIGNIFICANT CHANGE UP (ref 0–0)
PLATELET # BLD AUTO: 459 K/UL — HIGH (ref 150–400)
RBC # BLD: 3.35 M/UL — LOW (ref 4.2–5.8)
RBC # FLD: 13.8 % — SIGNIFICANT CHANGE UP (ref 10.3–14.5)
WBC # BLD: 13.12 K/UL — HIGH (ref 3.8–10.5)
WBC # FLD AUTO: 13.12 K/UL — HIGH (ref 3.8–10.5)

## 2024-04-17 RX ORDER — ENOXAPARIN SODIUM 100 MG/ML
40 INJECTION SUBCUTANEOUS EVERY 24 HOURS
Refills: 0 | Status: DISCONTINUED | OUTPATIENT
Start: 2024-04-17 | End: 2024-04-18

## 2024-04-17 RX ORDER — ACETAMINOPHEN 500 MG
1000 TABLET ORAL ONCE
Refills: 0 | Status: COMPLETED | OUTPATIENT
Start: 2024-04-17 | End: 2024-04-17

## 2024-04-17 RX ORDER — ASPIRIN/CALCIUM CARB/MAGNESIUM 324 MG
81 TABLET ORAL DAILY
Refills: 0 | Status: DISCONTINUED | OUTPATIENT
Start: 2024-04-17 | End: 2024-04-19

## 2024-04-17 RX ORDER — ACETAMINOPHEN 500 MG
650 TABLET ORAL ONCE
Refills: 0 | Status: COMPLETED | OUTPATIENT
Start: 2024-04-17 | End: 2024-04-17

## 2024-04-17 RX ADMIN — GABAPENTIN 300 MILLIGRAM(S): 400 CAPSULE ORAL at 21:07

## 2024-04-17 RX ADMIN — ENOXAPARIN SODIUM 40 MILLIGRAM(S): 100 INJECTION SUBCUTANEOUS at 14:12

## 2024-04-17 RX ADMIN — Medication 1000 MILLIGRAM(S): at 23:45

## 2024-04-17 RX ADMIN — Medication 650 MILLIGRAM(S): at 06:49

## 2024-04-17 RX ADMIN — OXYCODONE HYDROCHLORIDE 5 MILLIGRAM(S): 5 TABLET ORAL at 22:03

## 2024-04-17 RX ADMIN — Medication 400 MILLIGRAM(S): at 22:41

## 2024-04-17 RX ADMIN — Medication 1 TABLET(S): at 14:09

## 2024-04-17 RX ADMIN — Medication 81 MILLIGRAM(S): at 14:09

## 2024-04-17 RX ADMIN — GABAPENTIN 300 MILLIGRAM(S): 400 CAPSULE ORAL at 14:09

## 2024-04-17 RX ADMIN — LACOSAMIDE 150 MILLIGRAM(S): 50 TABLET ORAL at 05:01

## 2024-04-17 RX ADMIN — LACOSAMIDE 150 MILLIGRAM(S): 50 TABLET ORAL at 17:37

## 2024-04-17 RX ADMIN — ATORVASTATIN CALCIUM 20 MILLIGRAM(S): 80 TABLET, FILM COATED ORAL at 21:08

## 2024-04-17 RX ADMIN — TAMSULOSIN HYDROCHLORIDE 0.4 MILLIGRAM(S): 0.4 CAPSULE ORAL at 21:07

## 2024-04-17 RX ADMIN — GABAPENTIN 300 MILLIGRAM(S): 400 CAPSULE ORAL at 05:01

## 2024-04-17 RX ADMIN — OXYCODONE HYDROCHLORIDE 5 MILLIGRAM(S): 5 TABLET ORAL at 20:00

## 2024-04-17 RX ADMIN — SENNA PLUS 2 TABLET(S): 8.6 TABLET ORAL at 21:07

## 2024-04-17 NOTE — PROGRESS NOTE ADULT - ASSESSMENT
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 On admission, vitals significant for hypotension and labs showing anemia with CTA showing large hematoma of the LLE and small RLE hematoma with repeat CTA showing interval increase of the hematoma and new small LLE hematoma s/p 2units pRBCs. He is now admitted to the MICU for further management.       # LE Hematoma, Anemia   - 4/4 CT A w/ Mod- large SubC hematoma in the anteromedial L upper thigh containing scattered foci of high attenuation concerning for active hemorrhage within the hematoma; Small collection in the anterior upper right thigh suspicious for a hematoma. Mild subcutaneous inflammatory change in the visualized anterior and lateral upper right thigh with associated foci of subcutaneous gas.   - 4/5 CT w/ Interval increase in size of an anteromedial L upper thigh subc  hematoma with scattered foci of high attenuation within the hematoma compatible with active hemorrhage; New left gracilis intramuscular hematoma which could be extending from the subcutaneous hematoma.  - Duplex negative for DVT   - S/P Hematoma evacuation with Vascular on 4/5. Drains DC'd as per Vascular   - Pt with episode of worsening LLE edema 4/13, drop in Hgb. ASA and Lovenox held. S/P 2 units of PRBCs   - R/O Compartment syndrome. CT LE w/ evolving hematoma of medial L thigh, decreased in side, negative for active hemorrhage   - Lovenox 40 Qd held and then resumed 4/17 (Cleared by Vascular), Monitor H/H and LE closely. Monitor for bleeding. Outpatient follow up for further titration as tolerated . Outpatient repeat CBC, if stable patient to follow up for consideration of resumption on ASA on 4-5 days outpatient   - Pulse checks per protocol;  Monitor LE closely  - IR, Vascular, MICU care appreciated   - Plastic surgery eval appreciated; F/u recs --> wound is not yet amenable to reconstitution. Outpatient PSx follow up upon DC   - Seen by IR 4/16, no drainage offered, recommended for conservative management with wound care for L groin hematoma   - Per vascular surgery    #Leukocytosis  - Noted to have elevated WBC;  Likely inflammatory iso blood loss with low concern for infection  - Trend WBC and fever curve, If febrile, check pan cultures  - Check CBC outpatient    #Seizure disorder  # CVA with residual expressive aphasia  - C/w home AEDs (lacosamide and vyanese)  - C/w home gabapentin   - Seizure precautions  - Neuro eval appreciated; F/u recs     #HLD  - C/w home Lipitor    #BPH  - C/w flomax    #DVTs  - hx of bilateral DVTs --> Repeat duplex negative         Discussed with Attending, Heme/Onc and ACP.     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 On admission, vitals significant for hypotension and labs showing anemia with CTA showing large hematoma of the LLE and small RLE hematoma with repeat CTA showing interval increase of the hematoma and new small LLE hematoma s/p 2units pRBCs. He is now admitted to the MICU for further management.       # LE Hematoma, Anemia   - 4/4 CT A w/ Mod- large SubC hematoma in the anteromedial L upper thigh containing scattered foci of high attenuation concerning for active hemorrhage within the hematoma; Small collection in the anterior upper right thigh suspicious for a hematoma. Mild subcutaneous inflammatory change in the visualized anterior and lateral upper right thigh with associated foci of subcutaneous gas.   - 4/5 CT w/ Interval increase in size of an anteromedial L upper thigh subc  hematoma with scattered foci of high attenuation within the hematoma compatible with active hemorrhage; New left gracilis intramuscular hematoma which could be extending from the subcutaneous hematoma.  - Duplex negative for DVT   - S/P Hematoma evacuation with Vascular on 4/5. Drains DC'd as per Vascular   - Pt with episode of worsening LLE edema 4/13, drop in Hgb. ASA and Lovenox held. S/P 2 units of PRBCs   - R/O Compartment syndrome. CT LE w/ evolving hematoma of medial L thigh, decreased in side, negative for active hemorrhage   - Lovenox 40 Qd held and then resumed 4/17 with ASA 81 (Cleared by Vascular), Monitor H/H and LE closely. Monitor for bleeding. Outpatient follow up for further titration as tolerated - Planned for RTOR on 4/19 with Vascular Surgery. Pt is deemed to have elevated risk   - Pulse checks per protocol;  Monitor LE closely  - IR, Vascular, MICU care appreciated   - Plastic surgery eval appreciated; F/u recs --> wound is not yet amenable to reconstitution. Outpatient PSx follow up upon DC   - Seen by IR 4/16, no drainage offered, recommended for conservative management with wound care for L groin hematoma   - Per vascular surgery    #Leukocytosis  - Noted to have elevated WBC;  Likely inflammatory iso blood loss with low concern for infection  - Trend WBC and fever curve, If febrile, check pan cultures  - Check CBC outpatient    #Seizure disorder  # CVA with residual expressive aphasia  - C/w home AEDs (lacosamide and vyanese)  - C/w home gabapentin   - Seizure precautions  - Neuro eval appreciated; F/u recs     #HLD  - C/w home Lipitor    #BPH  - C/w flomax    #DVTs  - hx of bilateral DVTs --> Repeat duplex negative         Discussed with Attending, Heme/Onc and ACP.

## 2024-04-17 NOTE — PROGRESS NOTE ADULT - ASSESSMENT
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 Smith presents with lower extremity pain. He was found to have a hematoma on the medial left thigh. The patient is hemodynamically stable with Hgb 12 g/dL and intact motor and sensory functions in the lower extremity. CTA demonstrating active subcutaneous and gracilis hematomas, large in size compared to CT performed four hours prior. S/p OR on 4/5 for LLE hematoma evacuation.    Recommendations:  - Wound care to follow for L groin hematoma  - DVT prophylaxis ok to restart    Discussed with vascular surgery fellow on behalf of Dr. Christie.    Vascular Surgery  [id number]

## 2024-04-17 NOTE — PROGRESS NOTE ADULT - SUBJECTIVE AND OBJECTIVE BOX
Name of Patient : TAMI DUNHAM  MRN: [id number]  Date of visit: 04-17-24 @ 12:57      Subjective: Patient seen and examined. No new events except as noted.   Patient seen earlier this AM. Lying down in bed. Lovenox held yesterday as per Vacular surgery due to L Leg hematoma. Seen by IR and recommended for conservative  management, no drainage at this time.     REVIEW OF SYSTEMS:    CONSTITUTIONAL: Generalized weakness   EYES/ENT: No visual changes;  No vertigo or throat pain   NECK: No pain or stiffness  RESPIRATORY: No cough, wheezing, hemoptysis; No shortness of breath  CARDIOVASCULAR: No chest pain or palpitations  GASTROINTESTINAL: No abdominal or epigastric pain. No nausea, vomiting, or hematemesis; No diarrhea or constipation. No melena or hematochezia.  GENITOURINARY: No dysuria, frequency or hematuria  NEUROLOGICAL: No numbness or weakness  SKIN: LLE wrapped in ACE wrap   All other review of systems is negative unless indicated above.    MEDICATIONS:  MEDICATIONS  (STANDING):  aspirin  chewable 81 milliGRAM(s) Oral daily  atorvastatin 20 milliGRAM(s) Oral at bedtime  enoxaparin Injectable 40 milliGRAM(s) SubCutaneous every 24 hours  gabapentin 300 milliGRAM(s) Oral every 8 hours  lacosamide 150 milliGRAM(s) Oral two times a day  multivitamin 1 Tablet(s) Oral daily  polyethylene glycol 3350 17 Gram(s) Oral daily  senna 2 Tablet(s) Oral at bedtime  tamsulosin 0.4 milliGRAM(s) Oral at bedtime      PHYSICAL EXAM:  T(C): 37.2 (04-17-24 @ 01:00), Max: 37.2 (04-16-24 @ 22:00)  HR: 73 (04-17-24 @ 01:00) (70 - 74)  BP: 100/65 (04-17-24 @ 01:00) (98/59 - 106/68)  RR: 18 (04-17-24 @ 01:00) (17 - 18)  SpO2: 97% (04-17-24 @ 01:00) (95% - 99%)  Wt(kg): --  I&O's Summary    16 Apr 2024 07:01  -  17 Apr 2024 07:00  --------------------------------------------------------  IN: 0 mL / OUT: 700 mL / NET: -700 mL        Appearance: Awake, generalized weakness   HEENT: Eyes are open    Lymphatic: No lymphadenopathy grossly   Cardiovascular: Normal    Respiratory: normal effort, clear  Gastrointestinal:  Soft, Non-tender  Skin: No rashes,  warm to touch  Psychiatry:  Mood & affect appropriate  Musculoskeletal: LLE wrapped in ACE wrap           04-16-24 @ 07:01  -  04-17-24 @ 07:00  --------------------------------------------------------  IN: 0 mL / OUT: 700 mL / NET: -700 mL                                  9.6    13.12 )-----------( 459      ( 17 Apr 2024 07:24 )             29.6               04-16    136  |  103  |  14  ----------------------------<  96  4.0   |  21<L>  |  0.92    Ca    8.2<L>      16 Apr 2024 07:00  Phos  3.6     04-16  Mg     2.2     04-16    TPro  6.6  /  Alb  3.1<L>  /  TBili  0.5  /  DBili  x   /  AST  15  /  ALT  24  /  AlkPhos  95  04-16                       Urinalysis Basic - ( 16 Apr 2024 07:00 )    Color: x / Appearance: x / SG: x / pH: x  Gluc: 96 mg/dL / Ketone: x  / Bili: x / Urobili: x   Blood: x / Protein: x / Nitrite: x   Leuk Esterase: x / RBC: x / WBC x   Sq Epi: x / Non Sq Epi: x / Bacteria: x

## 2024-04-17 NOTE — PROGRESS NOTE ADULT - ASSESSMENT
53y Male with pmhx CVA abd Hx of DVT's    Anemia  - patient anemic (Hb baseline 13-14) 2/2 hematoma; S/P PRBC 2U in ED.    - follow up IR and vascular recs  - transfuse for Hb>7  - monitor cbc Q12      DVTs  - hx of bilateral DVTs (noted to resolve 1/24)  - pt last seen Dr Varela on 12/2023  - CT angio LLE 4/13/2024 - medial L thigh hematoma dec in size.   - cont with aspirin and low dose lovenox for now.   - will follow up in office     MSK  - patient's LLE swollen with concern for compartment syndrome  - Interval increase in size ( 61-->71cm)   - f/u ortho and vascular recs for management for possible fasciotomy   - pain control per primary team  - s/p evacuation of hematoma in OR 4/5  - overall improved condition  - continue ongoing need for acute inpatient rehab          Fito Varela MD  HematologyOncology   O: [phone]

## 2024-04-17 NOTE — PROGRESS NOTE ADULT - SUBJECTIVE AND OBJECTIVE BOX
Patient seen and examined at bedside.   eating breakfast today   - feels overall well     MEDICATIONS  (STANDING):  aspirin  chewable 81 milliGRAM(s) Oral daily  atorvastatin 20 milliGRAM(s) Oral at bedtime  enoxaparin Injectable 40 milliGRAM(s) SubCutaneous every 24 hours  gabapentin 300 milliGRAM(s) Oral every 8 hours  lacosamide 150 milliGRAM(s) Oral two times a day  multivitamin 1 Tablet(s) Oral daily  polyethylene glycol 3350 17 Gram(s) Oral daily  senna 2 Tablet(s) Oral at bedtime  tamsulosin 0.4 milliGRAM(s) Oral at bedtime    MEDICATIONS  (PRN):  bacitracin   Ointment 1 Application(s) Topical daily PRN daily dressing change  meclizine 12.5 milliGRAM(s) Oral three times a day PRN Dizziness  oxyCODONE    IR 5 milliGRAM(s) Oral every 6 hours PRN Moderate Pain (4 - 6)        Vital Signs Last 24 Hrs  T(C): 37.2 (17 Apr 2024 01:00), Max: 37.2 (16 Apr 2024 22:00)  T(F): 98.9 (17 Apr 2024 01:00), Max: 98.9 (16 Apr 2024 22:00)  HR: 73 (17 Apr 2024 01:00) (70 - 73)  BP: 100/65 (17 Apr 2024 01:00) (98/59 - 106/68)  BP(mean): --  RR: 18 (17 Apr 2024 01:00) (17 - 18)  SpO2: 97% (17 Apr 2024 01:00) (97% - 99%)    Parameters below as of 17 Apr 2024 01:00  Patient On (Oxygen Delivery Method): room air          PHYSICAL EXAM:     GENERAL:  Appears stated age, well-groomed  HEENT:  NC/AT,  conjunctivae clear and pink  CHEST:  CTA b/l  HEART:  S1 s2+   ABDOMEN:  Soft, non-tender, non-distended   NEURO:  Alert, oriented, no asterixis                            9.6    13.12 )-----------( 459      ( 17 Apr 2024 07:24 )             29.6       04-16    136  |  103  |  14  ----------------------------<  96  4.0   |  21<L>  |  0.92    Ca    8.2<L>      16 Apr 2024 07:00  Phos  3.6     04-16  Mg     2.2     04-16    TPro  6.6  /  Alb  3.1<L>  /  TBili  0.5  /  DBili  x   /  AST  15  /  ALT  24  /  AlkPhos  95  04-16

## 2024-04-17 NOTE — PROGRESS NOTE ADULT - SUBJECTIVE AND OBJECTIVE BOX
Subjective: Pt seen and examined at bedside with surgical team.    Vital Signs Last 24 Hrs  T(C): 37.2 (17 Apr 2024 01:00), Max: 37.2 (16 Apr 2024 22:00)  T(F): 98.9 (17 Apr 2024 01:00), Max: 98.9 (16 Apr 2024 22:00)  HR: 73 (17 Apr 2024 01:00) (70 - 74)  BP: 100/65 (17 Apr 2024 01:00) (98/59 - 106/68)  BP(mean): --  RR: 18 (17 Apr 2024 01:00) (17 - 18)  SpO2: 97% (17 Apr 2024 01:00) (95% - 99%)    Parameters below as of 17 Apr 2024 01:00  Patient On (Oxygen Delivery Method): room air        I&O's Detail    16 Apr 2024 07:01  -  17 Apr 2024 07:00  --------------------------------------------------------  IN:  Total IN: 0 mL    OUT:    Voided (mL): 700 mL  Total OUT: 700 mL    Total NET: -700 mL      MEDICATIONS  (STANDING):  atorvastatin 20 milliGRAM(s) Oral at bedtime  gabapentin 300 milliGRAM(s) Oral every 8 hours  lacosamide 150 milliGRAM(s) Oral two times a day  multivitamin 1 Tablet(s) Oral daily  polyethylene glycol 3350 17 Gram(s) Oral daily  senna 2 Tablet(s) Oral at bedtime  tamsulosin 0.4 milliGRAM(s) Oral at bedtime    MEDICATIONS  (PRN):  bacitracin   Ointment 1 Application(s) Topical daily PRN daily dressing change  meclizine 12.5 milliGRAM(s) Oral three times a day PRN Dizziness  oxyCODONE    IR 5 milliGRAM(s) Oral every 6 hours PRN Moderate Pain (4 - 6)      LABS:                        9.6    13.12 )-----------( 459      ( 17 Apr 2024 07:24 )             29.6     04-16    136  |  103  |  14  ----------------------------<  96  4.0   |  21<L>  |  0.92    Ca    8.2<L>      16 Apr 2024 07:00  Phos  3.6     04-16  Mg     2.2     04-16    TPro  6.6  /  Alb  3.1<L>  /  TBili  0.5  /  DBili  x   /  AST  15  /  ALT  24  /  AlkPhos  95  04-16      LIVER FUNCTIONS - ( 16 Apr 2024 07:00 )  Alb: 3.1 g/dL / Pro: 6.6 g/dL / ALK PHOS: 95 U/L / ALT: 24 U/L / AST: 15 U/L / GGT: x           Urinalysis Basic - ( 16 Apr 2024 07:00 )    Color: x / Appearance: x / SG: x / pH: x  Gluc: 96 mg/dL / Ketone: x  / Bili: x / Urobili: x   Blood: x / Protein: x / Nitrite: x   Leuk Esterase: x / RBC: x / WBC x   Sq Epi: x / Non Sq Epi: x / Bacteria: x      General Exam:   General Appearance: Appropriately dressed and in no acute distress       Head: Normocephalic, atraumatic and no dysmorphic features  Ear, Nose, and Throat: Moist mucous membranes  CVS: S1S2+  Resp: No SOB, no wheeze or rhonchi  GI: soft NT/ND  Extremities: No edema or cyanosis L leg thigh hematoma   Skin: No bruises or rashes  Neuro: AO4, UE and LE strength 5/5

## 2024-04-18 ENCOUNTER — TRANSCRIPTION ENCOUNTER (OUTPATIENT)
Age: 54
End: 2024-04-18

## 2024-04-18 LAB
ANION GAP SERPL CALC-SCNC: 12 MMOL/L — SIGNIFICANT CHANGE UP (ref 5–17)
BASOPHILS # BLD AUTO: 0.1 K/UL — SIGNIFICANT CHANGE UP (ref 0–0.2)
BASOPHILS NFR BLD AUTO: 1.3 % — SIGNIFICANT CHANGE UP (ref 0–2)
BUN SERPL-MCNC: 16 MG/DL — SIGNIFICANT CHANGE UP (ref 7–23)
CALCIUM SERPL-MCNC: 8.7 MG/DL — SIGNIFICANT CHANGE UP (ref 8.4–10.5)
CHLORIDE SERPL-SCNC: 104 MMOL/L — SIGNIFICANT CHANGE UP (ref 96–108)
CO2 SERPL-SCNC: 22 MMOL/L — SIGNIFICANT CHANGE UP (ref 22–31)
CREAT SERPL-MCNC: 0.92 MG/DL — SIGNIFICANT CHANGE UP (ref 0.5–1.3)
EGFR: 99 ML/MIN/1.73M2 — SIGNIFICANT CHANGE UP
EOSINOPHIL # BLD AUTO: 0.51 K/UL — HIGH (ref 0–0.5)
EOSINOPHIL NFR BLD AUTO: 6.7 % — HIGH (ref 0–6)
GLUCOSE SERPL-MCNC: 92 MG/DL — SIGNIFICANT CHANGE UP (ref 70–99)
HCT VFR BLD CALC: 34.4 % — LOW (ref 39–50)
HGB BLD-MCNC: 10.3 G/DL — LOW (ref 13–17)
IMM GRANULOCYTES NFR BLD AUTO: 1.8 % — HIGH (ref 0–0.9)
LYMPHOCYTES # BLD AUTO: 1.27 K/UL — SIGNIFICANT CHANGE UP (ref 1–3.3)
LYMPHOCYTES # BLD AUTO: 16.6 % — SIGNIFICANT CHANGE UP (ref 13–44)
MAGNESIUM SERPL-MCNC: 2.5 MG/DL — SIGNIFICANT CHANGE UP (ref 1.6–2.6)
MCHC RBC-ENTMCNC: 27.5 PG — SIGNIFICANT CHANGE UP (ref 27–34)
MCHC RBC-ENTMCNC: 29.9 GM/DL — LOW (ref 32–36)
MCV RBC AUTO: 91.7 FL — SIGNIFICANT CHANGE UP (ref 80–100)
MONOCYTES # BLD AUTO: 0.59 K/UL — SIGNIFICANT CHANGE UP (ref 0–0.9)
MONOCYTES NFR BLD AUTO: 7.7 % — SIGNIFICANT CHANGE UP (ref 2–14)
NEUTROPHILS # BLD AUTO: 5.05 K/UL — SIGNIFICANT CHANGE UP (ref 1.8–7.4)
NEUTROPHILS NFR BLD AUTO: 65.9 % — SIGNIFICANT CHANGE UP (ref 43–77)
NRBC # BLD: 0 /100 WBCS — SIGNIFICANT CHANGE UP (ref 0–0)
PHOSPHATE SERPL-MCNC: 4.2 MG/DL — SIGNIFICANT CHANGE UP (ref 2.5–4.5)
PLATELET # BLD AUTO: 445 K/UL — HIGH (ref 150–400)
POTASSIUM SERPL-MCNC: 4.1 MMOL/L — SIGNIFICANT CHANGE UP (ref 3.5–5.3)
POTASSIUM SERPL-SCNC: 4.1 MMOL/L — SIGNIFICANT CHANGE UP (ref 3.5–5.3)
RBC # BLD: 3.75 M/UL — LOW (ref 4.2–5.8)
RBC # FLD: 14 % — SIGNIFICANT CHANGE UP (ref 10.3–14.5)
SODIUM SERPL-SCNC: 138 MMOL/L — SIGNIFICANT CHANGE UP (ref 135–145)
WBC # BLD: 7.66 K/UL — SIGNIFICANT CHANGE UP (ref 3.8–10.5)
WBC # FLD AUTO: 7.66 K/UL — SIGNIFICANT CHANGE UP (ref 3.8–10.5)

## 2024-04-18 RX ORDER — PIPERACILLIN AND TAZOBACTAM 4; .5 G/20ML; G/20ML
3.38 INJECTION, POWDER, LYOPHILIZED, FOR SOLUTION INTRAVENOUS ONCE
Refills: 0 | Status: COMPLETED | OUTPATIENT
Start: 2024-04-18 | End: 2024-04-18

## 2024-04-18 RX ORDER — PIPERACILLIN AND TAZOBACTAM 4; .5 G/20ML; G/20ML
3.38 INJECTION, POWDER, LYOPHILIZED, FOR SOLUTION INTRAVENOUS EVERY 8 HOURS
Refills: 0 | Status: DISCONTINUED | OUTPATIENT
Start: 2024-04-19 | End: 2024-04-19

## 2024-04-18 RX ORDER — HYDROMORPHONE HYDROCHLORIDE 2 MG/ML
1 INJECTION INTRAMUSCULAR; INTRAVENOUS; SUBCUTANEOUS EVERY 6 HOURS
Refills: 0 | Status: DISCONTINUED | OUTPATIENT
Start: 2024-04-18 | End: 2024-04-19

## 2024-04-18 RX ORDER — OXYCODONE HYDROCHLORIDE 5 MG/1
5 TABLET ORAL ONCE
Refills: 0 | Status: DISCONTINUED | OUTPATIENT
Start: 2024-04-18 | End: 2024-04-18

## 2024-04-18 RX ADMIN — LACOSAMIDE 150 MILLIGRAM(S): 50 TABLET ORAL at 17:10

## 2024-04-18 RX ADMIN — SENNA PLUS 2 TABLET(S): 8.6 TABLET ORAL at 21:21

## 2024-04-18 RX ADMIN — GABAPENTIN 300 MILLIGRAM(S): 400 CAPSULE ORAL at 14:50

## 2024-04-18 RX ADMIN — ENOXAPARIN SODIUM 40 MILLIGRAM(S): 100 INJECTION SUBCUTANEOUS at 14:50

## 2024-04-18 RX ADMIN — OXYCODONE HYDROCHLORIDE 5 MILLIGRAM(S): 5 TABLET ORAL at 09:30

## 2024-04-18 RX ADMIN — PIPERACILLIN AND TAZOBACTAM 200 GRAM(S): 4; .5 INJECTION, POWDER, LYOPHILIZED, FOR SOLUTION INTRAVENOUS at 17:10

## 2024-04-18 RX ADMIN — HYDROMORPHONE HYDROCHLORIDE 1 MILLIGRAM(S): 2 INJECTION INTRAMUSCULAR; INTRAVENOUS; SUBCUTANEOUS at 17:13

## 2024-04-18 RX ADMIN — ATORVASTATIN CALCIUM 20 MILLIGRAM(S): 80 TABLET, FILM COATED ORAL at 21:20

## 2024-04-18 RX ADMIN — GABAPENTIN 300 MILLIGRAM(S): 400 CAPSULE ORAL at 05:10

## 2024-04-18 RX ADMIN — PIPERACILLIN AND TAZOBACTAM 25 GRAM(S): 4; .5 INJECTION, POWDER, LYOPHILIZED, FOR SOLUTION INTRAVENOUS at 21:20

## 2024-04-18 RX ADMIN — TAMSULOSIN HYDROCHLORIDE 0.4 MILLIGRAM(S): 0.4 CAPSULE ORAL at 21:20

## 2024-04-18 RX ADMIN — OXYCODONE HYDROCHLORIDE 5 MILLIGRAM(S): 5 TABLET ORAL at 08:44

## 2024-04-18 RX ADMIN — GABAPENTIN 300 MILLIGRAM(S): 400 CAPSULE ORAL at 21:21

## 2024-04-18 RX ADMIN — HYDROMORPHONE HYDROCHLORIDE 1 MILLIGRAM(S): 2 INJECTION INTRAMUSCULAR; INTRAVENOUS; SUBCUTANEOUS at 17:45

## 2024-04-18 RX ADMIN — LACOSAMIDE 150 MILLIGRAM(S): 50 TABLET ORAL at 05:10

## 2024-04-18 NOTE — CHART NOTE - NSCHARTNOTEFT_GEN_A_CORE
Pre-op for Left Leg Washout and Debridement tomorrow 4/19  - npo  - ivf  - full set of labs at midnight  - discussed with primary team    Vascular  [id number] Pre-op for Left Leg Washout and Debridement tomorrow 4/19 with Dr. Christie  - npo at midnight  - full set of labs at midnight  - consented  - discussed with primary team    Vascular  [id number]

## 2024-04-18 NOTE — PROGRESS NOTE ADULT - ASSESSMENT
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 Smith presents with lower extremity pain. He was found to have a hematoma on the medial left thigh. The patient is hemodynamically stable with Hgb 12 g/dL and intact motor and sensory functions in the lower extremity. CTA demonstrating active subcutaneous and gracilis hematomas, large in size compared to CT performed four hours prior. S/p OR on 4/5 for LLE hematoma evacuation.    Plan:  - Scheduled for L thigh washout and possible vac placement tomorrow   - NPO@MN and preop labs at 4 am (Cbc, BMP, coags, T&S)   - Consent in chart   - Please start abx given purulence draining from wound   - Change dressing as needed if soiled    Seen with Dr. Christie     Vascular Surgery  [id number]

## 2024-04-18 NOTE — PROGRESS NOTE ADULT - ASSESSMENT
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 Noticed within the last hour. No falls or trauma to the area. Administered Lovenox just prior to arrival. Denies fevers, chills, nausea, vomiting, urinary symptoms. No recent surgeries  found left thigh hematoma   s/p 2 units of PRBC   s/p hematoma evacuation 4/5, now doing well  s/p PRBC again     Impression:   1) chronic strokes, resid minimal WFD  2) seizure d/o 2/2 strokes  3) ADHD  4) vertigo BPPV, stable   5) new L thigh hematoma   6) LLE compartment syndrome     - back on ASA 81mg daily and lovenox 40 SQ   - plan to RTOR on 4/19   - monitor H/H.      - monitor for compartment syndrome   - Lovenox full dose now held.  was injecting in thigh at site of hematoma ; last admission Lovenox started for DVT and changed to BID dosing   - for ADHD gets Vyvanse 50mg daily  - for seizure he is on vimpat 150 mg BID  - statin therapy with LDL goal < 70mg/dL; atorvastatin 20 at home   - meclizline PRN   - PT/OT   - check FS, glucose control <180  - GI/DVT ppx   - Thank you for allowing me to participate in the care of this patient. Call with questions.      Braxton Forde MD  Vascular Neurology  Office: [phone]

## 2024-04-18 NOTE — PROGRESS NOTE ADULT - SUBJECTIVE AND OBJECTIVE BOX
Surgery Progress Note      SUBJECTIVE: Patient seen and examined at bedside with surgical team. No acute events overnight.     OBJECTIVE:    Vital Signs Last 24 Hrs  T(C): 36.8 (18 Apr 2024 15:57), Max: 36.8 (18 Apr 2024 08:00)  T(F): 98.3 (18 Apr 2024 15:57), Max: 98.3 (18 Apr 2024 08:00)  HR: 65 (18 Apr 2024 15:57) (62 - 67)  BP: 94/61 (18 Apr 2024 15:57) (94/61 - 102/66)  BP(mean): --  RR: 17 (18 Apr 2024 15:57) (17 - 18)  SpO2: 97% (18 Apr 2024 15:57) (97% - 98%)    Parameters below as of 18 Apr 2024 15:57  Patient On (Oxygen Delivery Method): room air    I&O's Detail    17 Apr 2024 07:01  -  18 Apr 2024 07:00  --------------------------------------------------------  IN:    Oral Fluid: 320 mL  Total IN: 320 mL    OUT:  Total OUT: 0 mL    Total NET: 320 mL      18 Apr 2024 07:01  -  18 Apr 2024 16:18  --------------------------------------------------------  IN:  Total IN: 0 mL    OUT:    Voided (mL): 950 mL  Total OUT: 950 mL    Total NET: -950 mL      MEDICATIONS  (STANDING):  aspirin  chewable 81 milliGRAM(s) Oral daily  atorvastatin 20 milliGRAM(s) Oral at bedtime  enoxaparin Injectable 40 milliGRAM(s) SubCutaneous every 24 hours  gabapentin 300 milliGRAM(s) Oral every 8 hours  lacosamide 150 milliGRAM(s) Oral two times a day  multivitamin 1 Tablet(s) Oral daily  oxyCODONE    IR 5 milliGRAM(s) Oral once  polyethylene glycol 3350 17 Gram(s) Oral daily  senna 2 Tablet(s) Oral at bedtime  tamsulosin 0.4 milliGRAM(s) Oral at bedtime    MEDICATIONS  (PRN):  bacitracin   Ointment 1 Application(s) Topical daily PRN daily dressing change  meclizine 12.5 milliGRAM(s) Oral three times a day PRN Dizziness  oxyCODONE    IR 5 milliGRAM(s) Oral every 6 hours PRN Moderate Pain (4 - 6)      PHYSICAL EXAM:  Constitutional:  NAD  Respiratory: Unlabored breathing  Extremity: L thigh incision opened at bedside. Seroma with purulence drained and packed with 1 inch packing.     LABS:                        10.3   7.66  )-----------( 445      ( 18 Apr 2024 06:59 )             34.4     04-18    138  |  104  |  16  ----------------------------<  92  4.1   |  22  |  0.92    Ca    8.7      18 Apr 2024 06:57  Phos  4.2     04-18  Mg     2.5     04-18          Urinalysis Basic - ( 18 Apr 2024 06:57 )    Color: x / Appearance: x / SG: x / pH: x  Gluc: 92 mg/dL / Ketone: x  / Bili: x / Urobili: x   Blood: x / Protein: x / Nitrite: x   Leuk Esterase: x / RBC: x / WBC x   Sq Epi: x / Non Sq Epi: x / Bacteria: x

## 2024-04-18 NOTE — PROGRESS NOTE ADULT - SUBJECTIVE AND OBJECTIVE BOX
Neurology        S: patient seen.  doing okay       Medications: MEDICATIONS  (STANDING):  aspirin  chewable 81 milliGRAM(s) Oral daily  atorvastatin 20 milliGRAM(s) Oral at bedtime  enoxaparin Injectable 40 milliGRAM(s) SubCutaneous every 24 hours  gabapentin 300 milliGRAM(s) Oral every 8 hours  lacosamide 150 milliGRAM(s) Oral two times a day  multivitamin 1 Tablet(s) Oral daily  polyethylene glycol 3350 17 Gram(s) Oral daily  senna 2 Tablet(s) Oral at bedtime  tamsulosin 0.4 milliGRAM(s) Oral at bedtime    MEDICATIONS  (PRN):  bacitracin   Ointment 1 Application(s) Topical daily PRN daily dressing change  meclizine 12.5 milliGRAM(s) Oral three times a day PRN Dizziness  oxyCODONE    IR 5 milliGRAM(s) Oral every 6 hours PRN Moderate Pain (4 - 6)       Vitals:  Vital Signs Last 24 Hrs  T(C): 36.8 (18 Apr 2024 08:00), Max: 36.8 (18 Apr 2024 08:00)  T(F): 98.3 (18 Apr 2024 08:00), Max: 98.3 (18 Apr 2024 08:00)  HR: 63 (18 Apr 2024 08:00) (62 - 67)  BP: 99/64 (18 Apr 2024 08:00) (98/60 - 102/66)  BP(mean): --  RR: 17 (18 Apr 2024 08:00) (17 - 18)  SpO2: 98% (18 Apr 2024 08:00) (97% - 98%)    Parameters below as of 18 Apr 2024 08:00  Patient On (Oxygen Delivery Method): room air              General Exam:   General Appearance: Appropriately dressed and in no acute distress       Head: Normocephalic, atraumatic and no dysmorphic features  Ear, Nose, and Throat: Moist mucous membranes  CVS: S1S2+  Resp: No SOB, no wheeze or rhonchi  GI: soft NT/ND  Extremities: No edema or cyanosis L leg thigh hematoma   Skin: No bruises or rashes     Neurological Exam:  Mental Status: Awake, alert and oriented x 3 minimal WFD .  Able to follow simple and complex verbal commands. Able to name and repeat. fluent speech. No obvious aphasia or dysarthria noted.   Cranial Nerves: PERRL, EOMI, VFFC, sensation V1-V3 intact,  no obvious facial asymmetry, equal elevation of palate, scm/trap 5/5, tongue is midline on protrusion. no obvious papilledema on fundoscopic exam. hearing is grossly intact.   Motor: Normal bulk, tone and strength throughout. Fine finger movements were intact and symmetric. no tremors or drift noted.    Sensation: Intact to light touch and pinprick throughout. no right/left confusion. no extinction to tactile on DSS.  .   Reflexes: 1+ throughout at biceps, brachioradialis, triceps, patellars and ankles bilaterally and equal. No clonus. R toe and L toe were both downgoing.  Coordination: No dysmetria on FNF    Gait: deferred     Data/Labs/Imaging which I personally reviewed.         LABS:                          10.3   7.66  )-----------( 445      ( 18 Apr 2024 06:59 )             34.4     04-18    138  |  104  |  16  ----------------------------<  92  4.1   |  22  |  0.92    Ca    8.7      18 Apr 2024 06:57  Phos  4.2     04-18  Mg     2.5     04-18          Urinalysis Basic - ( 18 Apr 2024 06:57 )    Color: x / Appearance: x / SG: x / pH: x  Gluc: 92 mg/dL / Ketone: x  / Bili: x / Urobili: x   Blood: x / Protein: x / Nitrite: x   Leuk Esterase: x / RBC: x / WBC x   Sq Epi: x / Non Sq Epi: x / Bacteria: x

## 2024-04-18 NOTE — PRE PROCEDURE NOTE - PRE PROCEDURE EVALUATION
Pre-operative Note    Pre-operative Diagnosis: L thigh wound   Procedure: L thigh washout and possible vac placement  Surgeon: Dr. Christie    Labs:                        10.3   7.66  )-----------( 445      ( 18 Apr 2024 06:59 )             34.4     04-18    138  |  104  |  16  ----------------------------<  92  4.1   |  22  |  0.92    Ca    8.7      18 Apr 2024 06:57  Phos  4.2     04-18  Mg     2.5     04-18        Type & Screen #1:   Type & Screen #2:   Pregnancy Test:     Imaging  - CXR:  - EKG:    AICD/Pacemaker Interrogation Date:     - Plan:  NPO at midnight  IVF while NPO per primary team  Insulin adjusted for NPO status  Type and Cross ordered for blood on hold for OR  Please continue VTE ppx  Consent obtained  
Preop Dx: Left thigh hematoma   Surgeon: Dr. Mariah Christie  Procedure: Left thigh hematoma evacuation    Vital Signs Last 24 Hrs  T(C): 36.5 (05 Apr 2024 09:01), Max: 36.8 (04 Apr 2024 10:50)  T(F): 97.7 (05 Apr 2024 08:27), Max: 98.3 (04 Apr 2024 10:50)  HR: 92 (05 Apr 2024 09:01) (77 - 111)  BP: 128/71 (05 Apr 2024 09:01) (87/65 - 133/84)  BP(mean): 89 (05 Apr 2024 09:01) (71 - 104)  RR: 20 (05 Apr 2024 09:01) (10 - 24)  SpO2: 99% (05 Apr 2024 09:01) (94% - 99%)    Parameters below as of 05 Apr 2024 04:00  Patient On (Oxygen Delivery Method): room air                            10.6   16.86 )-----------( 179      ( 05 Apr 2024 07:50 )             31.1     04-05    136  |  103  |  30<H>  ----------------------------<  185<H>  4.7   |  22  |  1.18    Ca    8.5      05 Apr 2024 03:34  Phos  4.4     04-05  Mg     2.0     04-05    TPro  6.0  /  Alb  3.4  /  TBili  0.2  /  DBili  x   /  AST  10  /  ALT  19  /  AlkPhos  65  04-05    PT/INR - ( 05 Apr 2024 03:34 )   PT: 11.7 sec;   INR: 1.12 ratio         PTT - ( 05 Apr 2024 03:34 )  PTT:30.8 sec  Daily Height in cm: 190.5 (05 Apr 2024 09:01)    Daily     CBC, BMP, coags, and type and screen reviewed    A/P: [age] Male with pmhx CVA with residual expressive aphasia with a loop recorder placed february 2022, 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 Mariah Christie  - NPO past midnight, had 30 cc water at 7am. Discussed with anesthesia.  - IVF while NPO  - Consent signed and in chart  - EP does not need to interrogate loop recorder

## 2024-04-18 NOTE — PROGRESS NOTE ADULT - SUBJECTIVE AND OBJECTIVE BOX
Name of Patient : TAMI DUNHAM  MRN: [id number]  Date of visit: 04-18-24 @ 12:10      Subjective: Patient seen and examined. No new events except as noted.   Patient seen earlier this AM. Lying down in bed. HOB elevated,   Reports pain is better controlled. Did take pain medication prior ot visit.  Hgb stable. Planned for OR tomorrow with Vascular for washout.     REVIEW OF SYSTEMS:    CONSTITUTIONAL: Generalized weakness   EYES/ENT: No visual changes;  No vertigo or throat pain   NECK: No pain or stiffness  RESPIRATORY: No cough, wheezing, hemoptysis; No shortness of breath  CARDIOVASCULAR: No chest pain or palpitations  GASTROINTESTINAL: No abdominal or epigastric pain. No nausea, vomiting, or hematemesis; No diarrhea or constipation. No melena or hematochezia.  GENITOURINARY: No dysuria, frequency or hematuria  NEUROLOGICAL: No numbness or weakness  SKIN: LLE wrapped in ACE wrap   All other review of systems is negative unless indicated above.    MEDICATIONS:  MEDICATIONS  (STANDING):  aspirin  chewable 81 milliGRAM(s) Oral daily  atorvastatin 20 milliGRAM(s) Oral at bedtime  enoxaparin Injectable 40 milliGRAM(s) SubCutaneous every 24 hours  gabapentin 300 milliGRAM(s) Oral every 8 hours  lacosamide 150 milliGRAM(s) Oral two times a day  multivitamin 1 Tablet(s) Oral daily  polyethylene glycol 3350 17 Gram(s) Oral daily  senna 2 Tablet(s) Oral at bedtime  tamsulosin 0.4 milliGRAM(s) Oral at bedtime      PHYSICAL EXAM:  T(C): 36.8 (04-18-24 @ 08:00), Max: 36.8 (04-18-24 @ 08:00)  HR: 63 (04-18-24 @ 08:00) (62 - 67)  BP: 99/64 (04-18-24 @ 08:00) (98/60 - 102/66)  RR: 17 (04-18-24 @ 08:00) (17 - 18)  SpO2: 98% (04-18-24 @ 08:00) (97% - 98%)  Wt(kg): --  I&O's Summary    17 Apr 2024 07:01  -  18 Apr 2024 07:00  --------------------------------------------------------  IN: 320 mL / OUT: 0 mL / NET: 320 mL    18 Apr 2024 07:01  -  18 Apr 2024 12:10  --------------------------------------------------------  IN: 0 mL / OUT: 950 mL / NET: -950 mL        Appearance: Awake, generalized weakness, lying down in bed   HEENT: Eyes are open    Lymphatic: No lymphadenopathy grossly   Cardiovascular: Normal    Respiratory: normal effort, clear  Gastrointestinal:  Soft, Non-tender  Skin: No rashes,  warm to touch  Psychiatry:  Mood & affect appropriate  Musculoskeletal: LLE wrapped in ACE wrap           04-17-24 @ 07:01  -  04-18-24 @ 07:00  --------------------------------------------------------  IN: 320 mL / OUT: 0 mL / NET: 320 mL    04-18-24 @ 07:01  -  04-18-24 @ 12:10  --------------------------------------------------------  IN: 0 mL / OUT: 950 mL / NET: -950 mL                                10.3   7.66  )-----------( 445      ( 18 Apr 2024 06:59 )             34.4               04-18    138  |  104  |  16  ----------------------------<  92  4.1   |  22  |  0.92    Ca    8.7      18 Apr 2024 06:57  Phos  4.2     04-18  Mg     2.5     04-18                         Urinalysis Basic - ( 18 Apr 2024 06:57 )    Color: x / Appearance: x / SG: x / pH: x  Gluc: 92 mg/dL / Ketone: x  / Bili: x / Urobili: x   Blood: x / Protein: x / Nitrite: x   Leuk Esterase: x / RBC: x / WBC x   Sq Epi: x / Non Sq Epi: x / Bacteria: x

## 2024-04-18 NOTE — PROGRESS NOTE ADULT - NS_MD_PANP_GEN_ALL_CORE
Attending and PA/NP shared services statement (NON-critical care):
1 person assist/set-up required/verbal cues

## 2024-04-18 NOTE — PRE PROCEDURE NOTE - ATTENDING COMMENTS
Patient was in last week and her blood pressure medication was changed. Since then she has been having swollen feet. Advise  
left thigh hematoma and seroma/  plan for washout and debridement  Risks, benefits, and alternatives of the procedure were discussed with the patient. All questions were answered. He agrees to proceed with the procedure.
Left thigh hematoma with skin compromise and blistering.   Need for hematoma evacuation.   Risks, benefits, and alternatives of the procedure were discussed with the patient. All questions were answered. He agrees to proceed with the procedure.

## 2024-04-18 NOTE — PROGRESS NOTE ADULT - ASSESSMENT
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 On admission, vitals significant for hypotension and labs showing anemia with CTA showing large hematoma of the LLE and small RLE hematoma with repeat CTA showing interval increase of the hematoma and new small LLE hematoma s/p 2units pRBCs. He is now admitted to the MICU for further management.       # LE Hematoma, Anemia   - 4/4 CT A w/ Mod- large SubC hematoma in the anteromedial L upper thigh containing scattered foci of high attenuation concerning for active hemorrhage within the hematoma; Small collection in the anterior upper right thigh suspicious for a hematoma. Mild subcutaneous inflammatory change in the visualized anterior and lateral upper right thigh with associated foci of subcutaneous gas.   - 4/5 CT w/ Interval increase in size of an anteromedial L upper thigh subc  hematoma with scattered foci of high attenuation within the hematoma compatible with active hemorrhage; New left gracilis intramuscular hematoma which could be extending from subcutaneous hematoma.  - Duplex negative for DVT   - S/P Hematoma evacuation with Vascular on 4/5. Drains DC'd as per Vascular   - Pt with episode of worsening LLE edema 4/13, drop in Hgb. ASA and Lovenox held. S/P 2 units of PRBCs   - R/O Compartment syndrome. CT LE w/ evolving hematoma of medial L thigh, decreased in side, negative for active hemorrhage   - Pulse checks per protocol;  Monitor LE closely  - IR, Vascular, MICU, Wound care appreciated   - Plastic surgery eval appreciated; F/u recs --> wound is not yet amenable to reconstitution. Outpatient PSx follow up upon DC   - Seen by IR 4/16, no drainage offered, recommended for conservative management with wound care for L groin hematoma   - Lovenox 40 Qd held and then resumed 4/17 with ASA 81 (Cleared by Vascular), Monitor H/H and LE closely. Monitor for bleeding. Outpatient follow up for further titration as tolerated - Planned for RTOR on 4/19 with Vascular Surgery. Pt is deemed to have elevated risk     #Leukocytosis  - Noted to have elevated WBC;  Likely inflammatory iso blood loss with low concern for infection  - Trend WBC and fever curve, If febrile, check pan cultures --> Down-trending     #Seizure disorder  # CVA with residual expressive aphasia  - C/w home AEDs (lacosamide and vyanese)  - C/w home gabapentin   - Seizure precautions  - Neuro eval appreciated; F/u recs     #HLD  - C/w home Lipitor    #BPH  - C/w flomax    #DVTs  - hx of bilateral DVTs --> Repeat duplex negative         Discussed with Attending.

## 2024-04-18 NOTE — PROGRESS NOTE ADULT - NS ATTEND AMEND GEN_ALL_CORE FT
54 y/o M w/hx of b/l LE DVT's, previously failed eliquis and was prescribed 100mg lovenox BID (previously 150 daily, will confirm he was taking the correct amount)  Admitted for large LLE hematoma  Being conservatively managed for now, no concern for compartment syndrome per ortho  Hgb still downtrending, continue to monitor CBC  AC on hold, s/p protamine and Kcentra
remains off anticoagulation this am  Hb overall stable  recommend to start ppx dose lovenox once cleared by vascular given high risk for clotting  Will follow
Agree with above assesment and plan. 52yo male with h/o CVA, b/l DVTs, Left lower extremity compartment syndrome (below the knee) requiring two operations for decompressive fasciotomies (January 2023) on lovenox 150mg daily for DVTs here for left thigh swelling/pain found to have hematoma. Was injecting lovenox in this location. Now s/p 1L clot removal.   AC currently on hold for procedure. he is s/p 3 units PRBC total.
As above.    54 y/o male with history of venous thromboembolism on enoxaparin, found to have hematoma. He is s/p evacuation of hematoma on April 5th. Now on prophylactic anticoagulation. Can continue current dosing and monitor for additional bleeding closely.
Pt care and plan discussed and reviewed with PA. Plan as outlined above edited by me to reflect our discussion.
Pt care and plan discussed and reviewed with PA. Plan as outlined above edited by me to reflect our discussion. Advanced care planning/advanced directives discussed with patient/family. DNR status including forceful chest compressions to attempt to restart the heart, ventilator support/artificial breathing, electric shock, artificial nutrition, health care proxy, Molst form all discussed with pt. More than 50% of the visit was spent counseling and/or coordinating care by the attending physician.
Labs and notes reviewed  overall patient is stable, Hb at 7.3,  AC on hold given recent hematoma,  no workup needed for coagulopathy as his hematoma related to recent ac use  would continue holding AC for now as full dose and consider starting ppx dose once cleared by vascular given he has a h/o CVA and recurrent VTE  Will follow
Pt care and plan discussed and reviewed with PA. Plan as outlined above edited by me to reflect our discussion. Advanced care planning/advanced directives discussed with patient/family. DNR status including forceful chest compressions to attempt to restart the heart, ventilator support/artificial breathing, electric shock, artificial nutrition, health care proxy, Molst form all discussed with pt. More than 50% of the visit was spent counseling and/or coordinating care by the attending physician.
Pt care and plan discussed and reviewed with PA. Plan as outlined above edited by me to reflect our discussion.
Pt care and plan discussed and reviewed with PA. Plan as outlined above edited by me to reflect our discussion. Advanced care planning/advanced directives discussed with patient/family. DNR status including forceful chest compressions to attempt to restart the heart, ventilator support/artificial breathing, electric shock, artificial nutrition, health care proxy, Molst form all discussed with pt. More than 50% of the visit was spent counseling and/or coordinating care by the attending physician.
Repeat lower ext US shows no DVT. has had multiple episodes of bleeding with full dose lovenox. can go ahead with ppx dose at this time and monitor for further thrombosis.   also on aspirin.   -will cont to follow as oupt

## 2024-04-18 NOTE — CHART NOTE - NSCHARTNOTEFT_GEN_A_CORE
Vascular changed dressing today and noted purulent drainage from left leg. Vascular requesting antibiotic coverage based on drainage appearance.

## 2024-04-19 ENCOUNTER — APPOINTMENT (OUTPATIENT)
Dept: VASCULAR SURGERY | Facility: HOSPITAL | Age: 54
End: 2024-04-19

## 2024-04-19 LAB
ALBUMIN SERPL ELPH-MCNC: 2.9 G/DL — LOW (ref 3.3–5)
ALP SERPL-CCNC: 90 U/L — SIGNIFICANT CHANGE UP (ref 40–120)
ALT FLD-CCNC: 29 U/L — SIGNIFICANT CHANGE UP (ref 10–45)
ANION GAP SERPL CALC-SCNC: 10 MMOL/L — SIGNIFICANT CHANGE UP (ref 5–17)
ANION GAP SERPL CALC-SCNC: 12 MMOL/L — SIGNIFICANT CHANGE UP (ref 5–17)
APTT BLD: 31.5 SEC — SIGNIFICANT CHANGE UP (ref 24.5–35.6)
AST SERPL-CCNC: 20 U/L — SIGNIFICANT CHANGE UP (ref 10–40)
BILIRUB SERPL-MCNC: 0.3 MG/DL — SIGNIFICANT CHANGE UP (ref 0.2–1.2)
BLD GP AB SCN SERPL QL: NEGATIVE — SIGNIFICANT CHANGE UP
BUN SERPL-MCNC: 14 MG/DL — SIGNIFICANT CHANGE UP (ref 7–23)
BUN SERPL-MCNC: 14 MG/DL — SIGNIFICANT CHANGE UP (ref 7–23)
CALCIUM SERPL-MCNC: 8.7 MG/DL — SIGNIFICANT CHANGE UP (ref 8.4–10.5)
CALCIUM SERPL-MCNC: 9 MG/DL — SIGNIFICANT CHANGE UP (ref 8.4–10.5)
CHLORIDE SERPL-SCNC: 104 MMOL/L — SIGNIFICANT CHANGE UP (ref 96–108)
CHLORIDE SERPL-SCNC: 105 MMOL/L — SIGNIFICANT CHANGE UP (ref 96–108)
CO2 SERPL-SCNC: 24 MMOL/L — SIGNIFICANT CHANGE UP (ref 22–31)
CO2 SERPL-SCNC: 25 MMOL/L — SIGNIFICANT CHANGE UP (ref 22–31)
CREAT SERPL-MCNC: 1.04 MG/DL — SIGNIFICANT CHANGE UP (ref 0.5–1.3)
CREAT SERPL-MCNC: 1.05 MG/DL — SIGNIFICANT CHANGE UP (ref 0.5–1.3)
EGFR: 85 ML/MIN/1.73M2 — SIGNIFICANT CHANGE UP
EGFR: 86 ML/MIN/1.73M2 — SIGNIFICANT CHANGE UP
GLUCOSE SERPL-MCNC: 90 MG/DL — SIGNIFICANT CHANGE UP (ref 70–99)
GLUCOSE SERPL-MCNC: 91 MG/DL — SIGNIFICANT CHANGE UP (ref 70–99)
HCT VFR BLD CALC: 30.7 % — LOW (ref 39–50)
HGB BLD-MCNC: 9.5 G/DL — LOW (ref 13–17)
INR BLD: 1.09 RATIO — SIGNIFICANT CHANGE UP (ref 0.85–1.18)
MAGNESIUM SERPL-MCNC: 2.4 MG/DL — SIGNIFICANT CHANGE UP (ref 1.6–2.6)
MCHC RBC-ENTMCNC: 27.9 PG — SIGNIFICANT CHANGE UP (ref 27–34)
MCHC RBC-ENTMCNC: 30.9 GM/DL — LOW (ref 32–36)
MCV RBC AUTO: 90 FL — SIGNIFICANT CHANGE UP (ref 80–100)
NRBC # BLD: 0 /100 WBCS — SIGNIFICANT CHANGE UP (ref 0–0)
PHOSPHATE SERPL-MCNC: 3.9 MG/DL — SIGNIFICANT CHANGE UP (ref 2.5–4.5)
PLATELET # BLD AUTO: 450 K/UL — HIGH (ref 150–400)
POTASSIUM SERPL-MCNC: 4.2 MMOL/L — SIGNIFICANT CHANGE UP (ref 3.5–5.3)
POTASSIUM SERPL-MCNC: 4.4 MMOL/L — SIGNIFICANT CHANGE UP (ref 3.5–5.3)
POTASSIUM SERPL-SCNC: 4.2 MMOL/L — SIGNIFICANT CHANGE UP (ref 3.5–5.3)
POTASSIUM SERPL-SCNC: 4.4 MMOL/L — SIGNIFICANT CHANGE UP (ref 3.5–5.3)
PROT SERPL-MCNC: 6.4 G/DL — SIGNIFICANT CHANGE UP (ref 6–8.3)
PROTHROM AB SERPL-ACNC: 11.4 SEC — SIGNIFICANT CHANGE UP (ref 9.5–13)
RBC # BLD: 3.41 M/UL — LOW (ref 4.2–5.8)
RBC # FLD: 13.8 % — SIGNIFICANT CHANGE UP (ref 10.3–14.5)
RH IG SCN BLD-IMP: NEGATIVE — SIGNIFICANT CHANGE UP
SODIUM SERPL-SCNC: 139 MMOL/L — SIGNIFICANT CHANGE UP (ref 135–145)
SODIUM SERPL-SCNC: 141 MMOL/L — SIGNIFICANT CHANGE UP (ref 135–145)
WBC # BLD: 7.06 K/UL — SIGNIFICANT CHANGE UP (ref 3.8–10.5)
WBC # FLD AUTO: 7.06 K/UL — SIGNIFICANT CHANGE UP (ref 3.8–10.5)

## 2024-04-19 PROCEDURE — 97597 DBRDMT OPN WND 1ST 20 CM/<: CPT | Mod: LT,59

## 2024-04-19 PROCEDURE — 27301 DRAIN THIGH/KNEE LESION: CPT | Mod: LT,78

## 2024-04-19 PROCEDURE — 97605 NEG PRS WND THER DME<=50SQCM: CPT | Mod: 59

## 2024-04-19 RX ORDER — SENNA PLUS 8.6 MG/1
2 TABLET ORAL AT BEDTIME
Refills: 0 | Status: DISCONTINUED | OUTPATIENT
Start: 2024-04-19 | End: 2024-04-27

## 2024-04-19 RX ORDER — POLYETHYLENE GLYCOL 3350 17 G/17G
17 POWDER, FOR SOLUTION ORAL DAILY
Refills: 0 | Status: DISCONTINUED | OUTPATIENT
Start: 2024-04-19 | End: 2024-04-27

## 2024-04-19 RX ORDER — BACITRACIN ZINC 500 UNIT/G
1 OINTMENT IN PACKET (EA) TOPICAL DAILY
Refills: 0 | Status: DISCONTINUED | OUTPATIENT
Start: 2024-04-19 | End: 2024-04-27

## 2024-04-19 RX ORDER — GABAPENTIN 400 MG/1
300 CAPSULE ORAL EVERY 8 HOURS
Refills: 0 | Status: DISCONTINUED | OUTPATIENT
Start: 2024-04-19 | End: 2024-04-27

## 2024-04-19 RX ORDER — HYDROMORPHONE HYDROCHLORIDE 2 MG/ML
1 INJECTION INTRAMUSCULAR; INTRAVENOUS; SUBCUTANEOUS EVERY 6 HOURS
Refills: 0 | Status: DISCONTINUED | OUTPATIENT
Start: 2024-04-19 | End: 2024-04-26

## 2024-04-19 RX ORDER — HYDROMORPHONE HYDROCHLORIDE 2 MG/ML
0.5 INJECTION INTRAMUSCULAR; INTRAVENOUS; SUBCUTANEOUS
Refills: 0 | Status: DISCONTINUED | OUTPATIENT
Start: 2024-04-19 | End: 2024-04-19

## 2024-04-19 RX ORDER — MECLIZINE HCL 12.5 MG
12.5 TABLET ORAL THREE TIMES A DAY
Refills: 0 | Status: DISCONTINUED | OUTPATIENT
Start: 2024-04-19 | End: 2024-04-27

## 2024-04-19 RX ORDER — ASPIRIN/CALCIUM CARB/MAGNESIUM 324 MG
81 TABLET ORAL DAILY
Refills: 0 | Status: DISCONTINUED | OUTPATIENT
Start: 2024-04-19 | End: 2024-04-27

## 2024-04-19 RX ORDER — LACOSAMIDE 50 MG/1
150 TABLET ORAL
Refills: 0 | Status: DISCONTINUED | OUTPATIENT
Start: 2024-04-19 | End: 2024-04-27

## 2024-04-19 RX ORDER — PIPERACILLIN AND TAZOBACTAM 4; .5 G/20ML; G/20ML
3.38 INJECTION, POWDER, LYOPHILIZED, FOR SOLUTION INTRAVENOUS EVERY 8 HOURS
Refills: 0 | Status: DISCONTINUED | OUTPATIENT
Start: 2024-04-20 | End: 2024-04-23

## 2024-04-19 RX ORDER — ATORVASTATIN CALCIUM 80 MG/1
20 TABLET, FILM COATED ORAL AT BEDTIME
Refills: 0 | Status: DISCONTINUED | OUTPATIENT
Start: 2024-04-19 | End: 2024-04-27

## 2024-04-19 RX ORDER — TAMSULOSIN HYDROCHLORIDE 0.4 MG/1
0.4 CAPSULE ORAL AT BEDTIME
Refills: 0 | Status: DISCONTINUED | OUTPATIENT
Start: 2024-04-19 | End: 2024-04-27

## 2024-04-19 RX ADMIN — SENNA PLUS 2 TABLET(S): 8.6 TABLET ORAL at 21:56

## 2024-04-19 RX ADMIN — HYDROMORPHONE HYDROCHLORIDE 1 MILLIGRAM(S): 2 INJECTION INTRAMUSCULAR; INTRAVENOUS; SUBCUTANEOUS at 12:34

## 2024-04-19 RX ADMIN — HYDROMORPHONE HYDROCHLORIDE 0.5 MILLIGRAM(S): 2 INJECTION INTRAMUSCULAR; INTRAVENOUS; SUBCUTANEOUS at 20:15

## 2024-04-19 RX ADMIN — HYDROMORPHONE HYDROCHLORIDE 1 MILLIGRAM(S): 2 INJECTION INTRAMUSCULAR; INTRAVENOUS; SUBCUTANEOUS at 06:08

## 2024-04-19 RX ADMIN — HYDROMORPHONE HYDROCHLORIDE 1 MILLIGRAM(S): 2 INJECTION INTRAMUSCULAR; INTRAVENOUS; SUBCUTANEOUS at 04:19

## 2024-04-19 RX ADMIN — HYDROMORPHONE HYDROCHLORIDE 0.5 MILLIGRAM(S): 2 INJECTION INTRAMUSCULAR; INTRAVENOUS; SUBCUTANEOUS at 20:00

## 2024-04-19 RX ADMIN — HYDROMORPHONE HYDROCHLORIDE 1 MILLIGRAM(S): 2 INJECTION INTRAMUSCULAR; INTRAVENOUS; SUBCUTANEOUS at 23:25

## 2024-04-19 RX ADMIN — GABAPENTIN 300 MILLIGRAM(S): 400 CAPSULE ORAL at 21:56

## 2024-04-19 RX ADMIN — PIPERACILLIN AND TAZOBACTAM 25 GRAM(S): 4; .5 INJECTION, POWDER, LYOPHILIZED, FOR SOLUTION INTRAVENOUS at 13:13

## 2024-04-19 RX ADMIN — TAMSULOSIN HYDROCHLORIDE 0.4 MILLIGRAM(S): 0.4 CAPSULE ORAL at 21:56

## 2024-04-19 RX ADMIN — HYDROMORPHONE HYDROCHLORIDE 1 MILLIGRAM(S): 2 INJECTION INTRAMUSCULAR; INTRAVENOUS; SUBCUTANEOUS at 13:34

## 2024-04-19 RX ADMIN — ATORVASTATIN CALCIUM 20 MILLIGRAM(S): 80 TABLET, FILM COATED ORAL at 21:56

## 2024-04-19 RX ADMIN — PIPERACILLIN AND TAZOBACTAM 25 GRAM(S): 4; .5 INJECTION, POWDER, LYOPHILIZED, FOR SOLUTION INTRAVENOUS at 05:23

## 2024-04-19 NOTE — PROGRESS NOTE ADULT - ASSESSMENT
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 Smith presents with lower extremity pain. He was found to have a hematoma on the medial left thigh. The patient is hemodynamically stable with Hgb 12 g/dL and intact motor and sensory functions in the lower extremity. CTA demonstrating active subcutaneous and gracilis hematomas, large in size compared to CT performed four hours prior. S/p OR on 4/5 for LLE hematoma evacuation.    Plan:  - OR today for L thigh washout and vac placement  - NPO since midnight  - c/w abx given purulence draining from wound   - Change dressing as needed if soiled      Vascular Surgery  [id number]

## 2024-04-19 NOTE — PROGRESS NOTE ADULT - ASSESSMENT
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 On admission, vitals significant for hypotension and labs showing anemia with CTA showing large hematoma of the LLE and small RLE hematoma with repeat CTA showing interval increase of the hematoma and new small LLE hematoma s/p 2units pRBCs. He is now admitted to the MICU for further management.       # LE Hematoma, Anemia   - 4/4 CT A w/ Mod- large SubC hematoma in the anteromedial L upper thigh containing scattered foci of high attenuation concerning for active hemorrhage within the hematoma; Small collection in the anterior upper right thigh suspicious for a hematoma. Mild subcutaneous inflammatory change in the visualized anterior and lateral upper right thigh with associated foci of subcutaneous gas.   - 4/5 CT w/ Interval increase in size of an anteromedial L upper thigh subc  hematoma with scattered foci of high attenuation within the hematoma compatible with active hemorrhage; New left gracilis intramuscular hematoma which could be extending from subcutaneous hematoma.  - Duplex negative for DVT   - S/P Hematoma evacuation with Vascular on 4/5. Drains DC'd as per Vascular   - Pt with episode of worsening LLE edema 4/13, drop in Hgb. ASA and Lovenox held. S/P 2 units of PRBCs   - R/O Compartment syndrome. CT LE w/ evolving hematoma of medial L thigh, decreased in side, negative for active hemorrhage   - Pulse checks per protocol;  Monitor LE closely  - IR, Vascular, MICU, Wound care appreciated   - Plastic surgery eval appreciated; F/u recs --> wound is not yet amenable to reconstitution. Outpatient PSx follow up upon DC   - Seen by IR 4/16, no drainage offered, recommended for conservative management with wound care for L groin hematoma   - lovenox PPX dose  - OR for washout per vascualr     #Leukocytosis  - Noted to have elevated WBC;  Likely inflammatory iso blood loss with low concern for infection  - Trend WBC and fever curve, If febrile, check pan cultures --> Down-trending     #Seizure disorder  # CVA with residual expressive aphasia  - C/w home AEDs (lacosamide and vyanese)  - C/w home gabapentin   - Seizure precautions  - Neuro eval appreciated; F/u recs     #HLD  - C/w home Lipitor    #BPH  - C/w flomax    #DVTs  - hx of bilateral DVTs --> Repeat duplex negative

## 2024-04-19 NOTE — BRIEF OPERATIVE NOTE - OPERATION/FINDINGS
infected hematoma
Evacuation of left thigh hematoma. 1L clot removed. Fascia close with vicryl, skin close with nylon sutures.

## 2024-04-19 NOTE — PROGRESS NOTE ADULT - SUBJECTIVE AND OBJECTIVE BOX
Name of Patient : TAMI DUNHAM  MRN: [id number]        Subjective: Patient seen and examined. No new events except as noted.   OR today for washout    REVIEW OF SYSTEMS:    CONSTITUTIONAL: No weakness, fevers or chills  EYES/ENT: No visual changes;  No vertigo or throat pain   NECK: No pain or stiffness  RESPIRATORY: No cough, wheezing, hemoptysis; No shortness of breath  CARDIOVASCULAR: No chest pain or palpitations  GASTROINTESTINAL: No abdominal or epigastric pain. No nausea, vomiting, or hematemesis; No diarrhea or constipation. No melena or hematochezia.  GENITOURINARY: No dysuria, frequency or hematuria  NEUROLOGICAL: No numbness or weakness  SKIN: No itching, burning, rashes, or lesions   All other review of systems is negative unless indicated above.    MEDICATIONS:  MEDICATIONS  (STANDING):  aspirin  chewable 81 milliGRAM(s) Oral daily  atorvastatin 20 milliGRAM(s) Oral at bedtime  gabapentin 300 milliGRAM(s) Oral every 8 hours  lacosamide 150 milliGRAM(s) Oral two times a day  multivitamin 1 Tablet(s) Oral daily  piperacillin/tazobactam IVPB.. 3.375 Gram(s) IV Intermittent every 8 hours  polyethylene glycol 3350 17 Gram(s) Oral daily  senna 2 Tablet(s) Oral at bedtime  tamsulosin 0.4 milliGRAM(s) Oral at bedtime      PHYSICAL EXAM:  T(C): 36.7 (04-19-24 @ 23:10), Max: 36.7 (04-19-24 @ 08:25)  HR: 65 (04-19-24 @ 23:10) (64 - 91)  BP: 99/62 (04-19-24 @ 23:10) (95/60 - 151/72)  RR: 18 (04-19-24 @ 23:10) (12 - 18)  SpO2: 96% (04-19-24 @ 23:10) (96% - 100%)  Wt(kg): --  I&O's Summary    18 Apr 2024 07:01  -  19 Apr 2024 07:00  --------------------------------------------------------  IN: 400 mL / OUT: 950 mL / NET: -550 mL      Height (cm): 190.5 (04-19 @ 17:32)  Weight (kg): 98.2 (04-19 @ 17:32)  BMI (kg/m2): 27.1 (04-19 @ 17:32)  BSA (m2): 2.27 (04-19 @ 17:32)    Appearance: Normal	  HEENT:  PERRLA   Lymphatic: No lymphadenopathy   Cardiovascular: Normal S1 S2, no JVD  Respiratory: normal effort , clear  Gastrointestinal:  Soft, Non-tender  Skin: No rashes,  warm to touch  Psychiatry:  Mood & affect appropriate  Musculuskeletal: No edema    recent labs, Imaging and EKGs personally reviewed             04-18-24 @ 07:01  -  04-19-24 @ 07:00  --------------------------------------------------------  IN: 400 mL / OUT: 950 mL / NET: -550 mL

## 2024-04-19 NOTE — BRIEF OPERATIVE NOTE - NSICDXBRIEFPREOP_GEN_ALL_CORE_FT
PRE-OP DIAGNOSIS:  Hematoma of left thigh 05-Apr-2024 12:53:39  Christiano Sorenson  
PRE-OP DIAGNOSIS:  Hematoma of left thigh 05-Apr-2024 12:53:39  Christiano Sorenson

## 2024-04-19 NOTE — BRIEF OPERATIVE NOTE - NSICDXBRIEFPROCEDURE_GEN_ALL_CORE_FT
PROCEDURES:  Evacuation of hematoma of thigh 05-Apr-2024 12:52:15  Christiano Sorenson  
PROCEDURES:  Evacuation of hematoma of thigh 05-Apr-2024 12:52:15  Christiano Sorenson

## 2024-04-19 NOTE — PROGRESS NOTE ADULT - SUBJECTIVE AND OBJECTIVE BOX
Neurology        S: patient seen.  doing okay ; plan for OR today       MEDICATIONS  (STANDING):  aspirin  chewable 81 milliGRAM(s) Oral daily  atorvastatin 20 milliGRAM(s) Oral at bedtime  gabapentin 300 milliGRAM(s) Oral every 8 hours  lacosamide 150 milliGRAM(s) Oral two times a day  multivitamin 1 Tablet(s) Oral daily  piperacillin/tazobactam IVPB.. 3.375 Gram(s) IV Intermittent every 8 hours  polyethylene glycol 3350 17 Gram(s) Oral daily  senna 2 Tablet(s) Oral at bedtime  tamsulosin 0.4 milliGRAM(s) Oral at bedtime    MEDICATIONS  (PRN):  bacitracin   Ointment 1 Application(s) Topical daily PRN daily dressing change  HYDROmorphone  Injectable 1 milliGRAM(s) IV Push every 6 hours PRN Moderate Pain (4 - 6)  meclizine 12.5 milliGRAM(s) Oral three times a day PRN Dizziness       Vital Signs Last 24 Hrs  T(C): 36.7 (04-19-24 @ 08:25), Max: 36.8 (04-18-24 @ 15:57)  T(F): 98.1 (04-19-24 @ 08:25), Max: 98.3 (04-18-24 @ 15:57)  HR: 64 (04-19-24 @ 08:25) (62 - 65)  BP: 95/60 (04-19-24 @ 08:25) (94/61 - 101/67)  BP(mean): --  RR: 18 (04-19-24 @ 08:25) (17 - 18)  SpO2: 99% (04-19-24 @ 08:25) (97% - 100%)           General Exam:   General Appearance: Appropriately dressed and in no acute distress       Head: Normocephalic, atraumatic and no dysmorphic features  Ear, Nose, and Throat: Moist mucous membranes  CVS: S1S2+  Resp: No SOB, no wheeze or rhonchi  GI: soft NT/ND  Extremities: No edema or cyanosis L leg thigh hematoma   Skin: No bruises or rashes     Neurological Exam:  Mental Status: Awake, alert and oriented x 3 minimal WFD .  Able to follow simple and complex verbal commands. Able to name and repeat. fluent speech. No obvious aphasia or dysarthria noted.   Cranial Nerves: PERRL, EOMI, VFFC, sensation V1-V3 intact,  no obvious facial asymmetry, equal elevation of palate, scm/trap 5/5, tongue is midline on protrusion. no obvious papilledema on fundoscopic exam. hearing is grossly intact.   Motor: Normal bulk, tone and strength throughout. Fine finger movements were intact and symmetric. no tremors or drift noted.    Sensation: Intact to light touch and pinprick throughout. no right/left confusion. no extinction to tactile on DSS.  .   Reflexes: 1+ throughout at biceps, brachioradialis, triceps, patellars and ankles bilaterally and equal. No clonus. R toe and L toe were both downgoing.  Coordination: No dysmetria on FNF    Gait: deferred     Data/Labs/Imaging which I personally reviewed.         LABS:                          9.5    7.06  )-----------( 450      ( 19 Apr 2024 03:56 )             30.7     04-19    139  |  104  |  14  ----------------------------<  90  4.2   |  25  |  1.04    Ca    9.0      19 Apr 2024 03:56  Phos  3.9     04-19  Mg     2.4     04-19    TPro  6.4  /  Alb  2.9<L>  /  TBili  0.3  /  DBili  x   /  AST  20  /  ALT  29  /  AlkPhos  90  04-19    LIVER FUNCTIONS - ( 19 Apr 2024 03:56 )  Alb: 2.9 g/dL / Pro: 6.4 g/dL / ALK PHOS: 90 U/L / ALT: 29 U/L / AST: 20 U/L / GGT: x           PT/INR - ( 19 Apr 2024 03:56 )   PT: 11.4 sec;   INR: 1.09 ratio         PTT - ( 19 Apr 2024 03:56 )  PTT:31.5 sec  Urinalysis Basic - ( 19 Apr 2024 03:56 )    Color: x / Appearance: x / SG: x / pH: x  Gluc: 90 mg/dL / Ketone: x  / Bili: x / Urobili: x   Blood: x / Protein: x / Nitrite: x   Leuk Esterase: x / RBC: x / WBC x   Sq Epi: x / Non Sq Epi: x / Bacteria: x

## 2024-04-19 NOTE — PROGRESS NOTE ADULT - SUBJECTIVE AND OBJECTIVE BOX
GENERAL SURGERY DAILY PROGRESS NOTE:     Subjective: Patient resting comfortably this am. NAEON. Ready for OR today.      Objective:    MEDICATIONS  (STANDING):  aspirin  chewable 81 milliGRAM(s) Oral daily  atorvastatin 20 milliGRAM(s) Oral at bedtime  gabapentin 300 milliGRAM(s) Oral every 8 hours  lacosamide 150 milliGRAM(s) Oral two times a day  multivitamin 1 Tablet(s) Oral daily  piperacillin/tazobactam IVPB.. 3.375 Gram(s) IV Intermittent every 8 hours  polyethylene glycol 3350 17 Gram(s) Oral daily  senna 2 Tablet(s) Oral at bedtime  tamsulosin 0.4 milliGRAM(s) Oral at bedtime    MEDICATIONS  (PRN):  bacitracin   Ointment 1 Application(s) Topical daily PRN daily dressing change  HYDROmorphone  Injectable 1 milliGRAM(s) IV Push every 6 hours PRN Moderate Pain (4 - 6)  meclizine 12.5 milliGRAM(s) Oral three times a day PRN Dizziness      Vital Signs Last 24 Hrs  T(C): 36.7 (19 Apr 2024 08:25), Max: 36.8 (18 Apr 2024 15:57)  T(F): 98.1 (19 Apr 2024 08:25), Max: 98.3 (18 Apr 2024 15:57)  HR: 64 (19 Apr 2024 08:25) (62 - 65)  BP: 95/60 (19 Apr 2024 08:25) (94/61 - 101/67)  BP(mean): --  RR: 18 (19 Apr 2024 08:25) (17 - 18)  SpO2: 99% (19 Apr 2024 08:25) (97% - 100%)    Parameters below as of 19 Apr 2024 08:25  Patient On (Oxygen Delivery Method): room air      PHYSICAL EXAM:  Constitutional:  NAD  Respiratory: Unlabored breathing  Extremity: L thigh incision opened at bedside. Seroma with purulence drained and packed with 1 inch packing.       I&O's Detail    18 Apr 2024 07:01  -  19 Apr 2024 07:00  --------------------------------------------------------  IN:    IV PiggyBack: 100 mL    Oral Fluid: 300 mL  Total IN: 400 mL    OUT:    Voided (mL): 950 mL  Total OUT: 950 mL    Total NET: -550 mL          Daily     Daily     LABS:                        9.5    7.06  )-----------( 450      ( 19 Apr 2024 03:56 )             30.7     04-19    139  |  104  |  14  ----------------------------<  90  4.2   |  25  |  1.04    Ca    9.0      19 Apr 2024 03:56  Phos  3.9     04-19  Mg     2.4     04-19    TPro  6.4  /  Alb  2.9<L>  /  TBili  0.3  /  DBili  x   /  AST  20  /  ALT  29  /  AlkPhos  90  04-19    PT/INR - ( 19 Apr 2024 03:56 )   PT: 11.4 sec;   INR: 1.09 ratio         PTT - ( 19 Apr 2024 03:56 )  PTT:31.5 sec  Urinalysis Basic - ( 19 Apr 2024 03:56 )    Color: x / Appearance: x / SG: x / pH: x  Gluc: 90 mg/dL / Ketone: x  / Bili: x / Urobili: x   Blood: x / Protein: x / Nitrite: x   Leuk Esterase: x / RBC: x / WBC x   Sq Epi: x / Non Sq Epi: x / Bacteria: x        RADIOLOGY & ADDITIONAL STUDIES:

## 2024-04-19 NOTE — BRIEF OPERATIVE NOTE - NSICDXBRIEFPOSTOP_GEN_ALL_CORE_FT
POST-OP DIAGNOSIS:  S/P evacuation of hematoma 05-Apr-2024 12:54:03  Christiano Sorenson  
POST-OP DIAGNOSIS:  S/P evacuation of hematoma 05-Apr-2024 12:54:03  Christiano Sorenson

## 2024-04-19 NOTE — PROGRESS NOTE ADULT - ASSESSMENT
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 Noticed within the last hour. No falls or trauma to the area. Administered Lovenox just prior to arrival. Denies fevers, chills, nausea, vomiting, urinary symptoms. No recent surgeries  found left thigh hematoma   s/p 2 units of PRBC   s/p hematoma evacuation 4/5, now doing well  s/p PRBC again     Impression:   1) chronic strokes, resid minimal WFD  2) seizure d/o 2/2 strokes  3) ADHD  4) vertigo BPPV, stable   5) new L thigh hematoma   6) LLE compartment syndrome     - back on ASA 81mg daily and lovenox 40 SQ   - plan to RTOR on 4/19  or wash out and vac placement by vascular sx   - monitor H/H.      - monitor for compartment syndrome   - Lovenox full dose now held.  was injecting in thigh at site of hematoma ; last admission Lovenox started for DVT and changed to BID dosing   - for ADHD gets Vyvanse 50mg daily  - for seizure he is on vimpat 150 mg BID  - statin therapy with LDL goal < 70mg/dL; atorvastatin 20 at home   - meclizline PRN   - PT/OT   - check FS, glucose control <180  - GI/DVT ppx   - Thank you for allowing me to participate in the care of this patient. Call with questions.      Braxton Forde MD  Vascular Neurology  Office: [phone]

## 2024-04-20 LAB
ALBUMIN SERPL ELPH-MCNC: 3.3 G/DL — SIGNIFICANT CHANGE UP (ref 3.3–5)
ALP SERPL-CCNC: 96 U/L — SIGNIFICANT CHANGE UP (ref 40–120)
ALT FLD-CCNC: 24 U/L — SIGNIFICANT CHANGE UP (ref 10–45)
ANION GAP SERPL CALC-SCNC: 12 MMOL/L — SIGNIFICANT CHANGE UP (ref 5–17)
AST SERPL-CCNC: 19 U/L — SIGNIFICANT CHANGE UP (ref 10–40)
BILIRUB SERPL-MCNC: 0.3 MG/DL — SIGNIFICANT CHANGE UP (ref 0.2–1.2)
BUN SERPL-MCNC: 12 MG/DL — SIGNIFICANT CHANGE UP (ref 7–23)
CALCIUM SERPL-MCNC: 9 MG/DL — SIGNIFICANT CHANGE UP (ref 8.4–10.5)
CHLORIDE SERPL-SCNC: 102 MMOL/L — SIGNIFICANT CHANGE UP (ref 96–108)
CO2 SERPL-SCNC: 23 MMOL/L — SIGNIFICANT CHANGE UP (ref 22–31)
CREAT SERPL-MCNC: 0.82 MG/DL — SIGNIFICANT CHANGE UP (ref 0.5–1.3)
EGFR: 105 ML/MIN/1.73M2 — SIGNIFICANT CHANGE UP
GLUCOSE SERPL-MCNC: 101 MG/DL — HIGH (ref 70–99)
NIGHT BLUE STAIN TISS: SIGNIFICANT CHANGE UP
POTASSIUM SERPL-MCNC: 4.4 MMOL/L — SIGNIFICANT CHANGE UP (ref 3.5–5.3)
POTASSIUM SERPL-SCNC: 4.4 MMOL/L — SIGNIFICANT CHANGE UP (ref 3.5–5.3)
PROT SERPL-MCNC: 6.6 G/DL — SIGNIFICANT CHANGE UP (ref 6–8.3)
SODIUM SERPL-SCNC: 137 MMOL/L — SIGNIFICANT CHANGE UP (ref 135–145)
SPECIMEN SOURCE: SIGNIFICANT CHANGE UP

## 2024-04-20 RX ORDER — BENZOCAINE AND MENTHOL 5; 1 G/100ML; G/100ML
1 LIQUID ORAL ONCE
Refills: 0 | Status: COMPLETED | OUTPATIENT
Start: 2024-04-20 | End: 2024-04-20

## 2024-04-20 RX ORDER — ACETAMINOPHEN 500 MG
975 TABLET ORAL ONCE
Refills: 0 | Status: COMPLETED | OUTPATIENT
Start: 2024-04-20 | End: 2024-04-20

## 2024-04-20 RX ADMIN — Medication 1 TABLET(S): at 11:03

## 2024-04-20 RX ADMIN — POLYETHYLENE GLYCOL 3350 17 GRAM(S): 17 POWDER, FOR SOLUTION ORAL at 11:03

## 2024-04-20 RX ADMIN — LACOSAMIDE 150 MILLIGRAM(S): 50 TABLET ORAL at 17:03

## 2024-04-20 RX ADMIN — Medication 975 MILLIGRAM(S): at 21:20

## 2024-04-20 RX ADMIN — HYDROMORPHONE HYDROCHLORIDE 1 MILLIGRAM(S): 2 INJECTION INTRAMUSCULAR; INTRAVENOUS; SUBCUTANEOUS at 00:55

## 2024-04-20 RX ADMIN — TAMSULOSIN HYDROCHLORIDE 0.4 MILLIGRAM(S): 0.4 CAPSULE ORAL at 21:03

## 2024-04-20 RX ADMIN — PIPERACILLIN AND TAZOBACTAM 25 GRAM(S): 4; .5 INJECTION, POWDER, LYOPHILIZED, FOR SOLUTION INTRAVENOUS at 21:03

## 2024-04-20 RX ADMIN — PIPERACILLIN AND TAZOBACTAM 25 GRAM(S): 4; .5 INJECTION, POWDER, LYOPHILIZED, FOR SOLUTION INTRAVENOUS at 05:18

## 2024-04-20 RX ADMIN — GABAPENTIN 300 MILLIGRAM(S): 400 CAPSULE ORAL at 13:02

## 2024-04-20 RX ADMIN — GABAPENTIN 300 MILLIGRAM(S): 400 CAPSULE ORAL at 05:17

## 2024-04-20 RX ADMIN — HYDROMORPHONE HYDROCHLORIDE 1 MILLIGRAM(S): 2 INJECTION INTRAMUSCULAR; INTRAVENOUS; SUBCUTANEOUS at 05:43

## 2024-04-20 RX ADMIN — SENNA PLUS 2 TABLET(S): 8.6 TABLET ORAL at 21:03

## 2024-04-20 RX ADMIN — ATORVASTATIN CALCIUM 20 MILLIGRAM(S): 80 TABLET, FILM COATED ORAL at 21:03

## 2024-04-20 RX ADMIN — Medication 975 MILLIGRAM(S): at 20:26

## 2024-04-20 RX ADMIN — LACOSAMIDE 150 MILLIGRAM(S): 50 TABLET ORAL at 05:18

## 2024-04-20 RX ADMIN — BENZOCAINE AND MENTHOL 1 LOZENGE: 5; 1 LIQUID ORAL at 05:49

## 2024-04-20 RX ADMIN — PIPERACILLIN AND TAZOBACTAM 25 GRAM(S): 4; .5 INJECTION, POWDER, LYOPHILIZED, FOR SOLUTION INTRAVENOUS at 13:02

## 2024-04-20 RX ADMIN — Medication 81 MILLIGRAM(S): at 11:03

## 2024-04-20 RX ADMIN — GABAPENTIN 300 MILLIGRAM(S): 400 CAPSULE ORAL at 21:03

## 2024-04-20 NOTE — PROGRESS NOTE ADULT - ASSESSMENT
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 Noticed within the last hour. No falls or trauma to the area. Administered Lovenox just prior to arrival. Denies fevers, chills, nausea, vomiting, urinary symptoms. No recent surgeries  found left thigh hematoma   s/p 2 units of PRBC   s/p hematoma evacuation 4/5, now doing well  s/p PRBC again   s/p OR 4/19 now with wound vac     Impression:   1) chronic strokes, resid minimal WFD  2) seizure d/o 2/2 strokes  3) ADHD  4) vertigo BPPV, stable   5) new L thigh hematoma   6) LLE compartment syndrome     - back on ASA 81mg daily     - monitor H/H.      - monitor for compartment syndrome   - Lovenox full dose now held.  was injecting in thigh at site of hematoma ; last admission Lovenox started for DVT and changed to BID dosing   - for ADHD gets Vyvanse 50mg daily  - for seizure he is on vimpat 150 mg BID  - statin therapy with LDL goal < 70mg/dL; atorvastatin 20 at home   - meclizline PRN   - PT/OT   - check FS, glucose control <180  - GI/DVT ppx   - Thank you for allowing me to participate in the care of this patient. Call with questions.      Braxton Forde MD  Vascular Neurology  Office: [phone]

## 2024-04-20 NOTE — PROGRESS NOTE ADULT - SUBJECTIVE AND OBJECTIVE BOX
Neurology        S: patient seen.  doing okay ;  s/p OR with new wound vac         Medications: MEDICATIONS  (STANDING):  aspirin  chewable 81 milliGRAM(s) Oral daily  atorvastatin 20 milliGRAM(s) Oral at bedtime  gabapentin 300 milliGRAM(s) Oral every 8 hours  lacosamide 150 milliGRAM(s) Oral two times a day  multivitamin 1 Tablet(s) Oral daily  piperacillin/tazobactam IVPB.. 3.375 Gram(s) IV Intermittent every 8 hours  polyethylene glycol 3350 17 Gram(s) Oral daily  senna 2 Tablet(s) Oral at bedtime  tamsulosin 0.4 milliGRAM(s) Oral at bedtime    MEDICATIONS  (PRN):  bacitracin   Ointment 1 Application(s) Topical daily PRN daily dressing change  HYDROmorphone  Injectable 1 milliGRAM(s) IV Push every 6 hours PRN Moderate Pain (4 - 6)  meclizine 12.5 milliGRAM(s) Oral three times a day PRN Dizziness       Vitals:  Vital Signs Last 24 Hrs  T(C): 36.8 (20 Apr 2024 18:55), Max: 36.9 (20 Apr 2024 08:47)  T(F): 98.3 (20 Apr 2024 18:55), Max: 98.4 (20 Apr 2024 08:47)  HR: 70 (20 Apr 2024 18:55) (65 - 70)  BP: 101/68 (20 Apr 2024 18:55) (98/62 - 101/68)  BP(mean): --  RR: 18 (20 Apr 2024 18:55) (18 - 18)  SpO2: 97% (20 Apr 2024 18:55) (96% - 97%)    Parameters below as of 20 Apr 2024 18:55  Patient On (Oxygen Delivery Method): room air            General Exam:   General Appearance: Appropriately dressed and in no acute distress       Head: Normocephalic, atraumatic and no dysmorphic features  Ear, Nose, and Throat: Moist mucous membranes  CVS: S1S2+  Resp: No SOB, no wheeze or rhonchi  GI: soft NT/ND  Extremities: No edema or cyanosis L leg thigh hematoma   Skin: No bruises or rashes     Neurological Exam:  Mental Status: Awake, alert and oriented x 3 minimal WFD .  Able to follow simple and complex verbal commands. Able to name and repeat. fluent speech. No obvious aphasia or dysarthria noted.   Cranial Nerves: PERRL, EOMI, VFFC, sensation V1-V3 intact,  no obvious facial asymmetry, equal elevation of palate, scm/trap 5/5, tongue is midline on protrusion. no obvious papilledema on fundoscopic exam. hearing is grossly intact.   Motor: Normal bulk, tone and strength throughout. Fine finger movements were intact and symmetric. no tremors or drift noted.    Sensation: Intact to light touch and pinprick throughout. no right/left confusion. no extinction to tactile on DSS.  .   Reflexes: 1+ throughout at biceps, brachioradialis, triceps, patellars and ankles bilaterally and equal. No clonus. R toe and L toe were both downgoing.  Coordination: No dysmetria on FNF    Gait: deferred     Data/Labs/Imaging which I personally reviewed.         LABS:                          9.5    7.06  )-----------( 450      ( 19 Apr 2024 03:56 )             30.7     04-20    137  |  102  |  12  ----------------------------<  101<H>  4.4   |  23  |  0.82    Ca    9.0      20 Apr 2024 07:07  Phos  3.9     04-19  Mg     2.4     04-19    TPro  6.6  /  Alb  3.3  /  TBili  0.3  /  DBili  x   /  AST  19  /  ALT  24  /  AlkPhos  96  04-20    LIVER FUNCTIONS - ( 20 Apr 2024 07:07 )  Alb: 3.3 g/dL / Pro: 6.6 g/dL / ALK PHOS: 96 U/L / ALT: 24 U/L / AST: 19 U/L / GGT: x           PT/INR - ( 19 Apr 2024 03:56 )   PT: 11.4 sec;   INR: 1.09 ratio         PTT - ( 19 Apr 2024 03:56 )  PTT:31.5 sec  Urinalysis Basic - ( 20 Apr 2024 07:07 )    Color: x / Appearance: x / SG: x / pH: x  Gluc: 101 mg/dL / Ketone: x  / Bili: x / Urobili: x   Blood: x / Protein: x / Nitrite: x   Leuk Esterase: x / RBC: x / WBC x   Sq Epi: x / Non Sq Epi: x / Bacteria: x

## 2024-04-20 NOTE — PROGRESS NOTE ADULT - ASSESSMENT
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 On admission, vitals significant for hypotension and labs showing anemia with CTA showing large hematoma of the LLE and small RLE hematoma with repeat CTA showing interval increase of the hematoma and new small LLE hematoma s/p 2units pRBCs. He is now admitted to the MICU for further management.       # LE Hematoma, Anemia   - 4/4 CT A w/ Mod- large SubC hematoma in the anteromedial L upper thigh containing scattered foci of high attenuation concerning for active hemorrhage within the hematoma; Small collection in the anterior upper right thigh suspicious for a hematoma. Mild subcutaneous inflammatory change in the visualized anterior and lateral upper right thigh with associated foci of subcutaneous gas.   - 4/5 CT w/ Interval increase in size of an anteromedial L upper thigh subc  hematoma with scattered foci of high attenuation within the hematoma compatible with active hemorrhage; New left gracilis intramuscular hematoma which could be extending from subcutaneous hematoma.  - Duplex negative for DVT   - S/P Hematoma evacuation with Vascular on 4/5. Drains DC'd as per Vascular   - Pt with episode of worsening LLE edema 4/13, drop in Hgb. ASA and Lovenox held. S/P 2 units of PRBCs   - R/O Compartment syndrome. CT LE w/ evolving hematoma of medial L thigh, decreased in side, negative for active hemorrhage   - Pulse checks per protocol;  Monitor LE closely  - IR, Vascular, MICU, Wound care appreciated   - Plastic surgery eval appreciated; F/u recs --> wound is not yet amenable to reconstitution. Outpatient PSx follow up upon DC   - Seen by IR 4/16, no drainage offered, recommended for conservative management with wound care for L groin hematoma   - lovenox PPX dose  - OR for washout per vascualr , done, drain care, follow up vascualr recs     #Leukocytosis  - Noted to have elevated WBC;  Likely inflammatory iso blood loss with low concern for infection  - Trend WBC and fever curve, If febrile, check pan cultures --> Down-trending     #Seizure disorder  # CVA with residual expressive aphasia  - C/w home AEDs (lacosamide and vyanese)  - C/w home gabapentin   - Seizure precautions  - Neuro eval appreciated; F/u recs     #HLD  - C/w home Lipitor    #BPH  - C/w flomax    #DVTs  - hx of bilateral DVTs --> Repeat duplex negative

## 2024-04-20 NOTE — PROGRESS NOTE ADULT - SUBJECTIVE AND OBJECTIVE BOX
Vascular Surgery Progress Note     Subjective:  Patient seen and examined. Patient endorses some incisional pain but states his pain is tolerable. He denies nausea, vomiting, chest pain, SOB.       Vital Signs:  Vital Signs Last 24 Hrs  T(C): 36.9 (20 Apr 2024 08:47), Max: 36.9 (20 Apr 2024 08:47)  T(F): 98.4 (20 Apr 2024 08:47), Max: 98.4 (20 Apr 2024 08:47)  HR: 66 (20 Apr 2024 08:47) (65 - 91)  BP: 98/62 (20 Apr 2024 08:47) (98/62 - 151/72)  BP(mean): 104 (19 Apr 2024 20:45) (73 - 104)  RR: 18 (20 Apr 2024 08:47) (12 - 18)  SpO2: 96% (20 Apr 2024 08:47) (96% - 100%)    Parameters below as of 20 Apr 2024 08:47  Patient On (Oxygen Delivery Method): room air        CAPILLARY BLOOD GLUCOSE          I&O's Detail    19 Apr 2024 07:01  -  20 Apr 2024 07:00  --------------------------------------------------------  IN:  Total IN: 0 mL    OUT:    Voided (mL): 450 mL  Total OUT: 450 mL    Total NET: -450 mL      20 Apr 2024 07:01  -  20 Apr 2024 09:09  --------------------------------------------------------  IN:    Oral Fluid: 240 mL  Total IN: 240 mL    OUT:    Voided (mL): 300 mL  Total OUT: 300 mL    Total NET: -60 mL            Physical Exam:  Constitutional:  NAD  Respiratory: Unlabored breathing  Extremity: L thigh incision opened at bedside. Seroma with purulence drained and packed with 1 inch packing.           Labs:    04-20    137  |  102  |  12  ----------------------------<  101<H>  4.4   |  23  |  0.82    Ca    9.0      20 Apr 2024 07:07  Phos  3.9     04-19  Mg     2.4     04-19    TPro  6.6  /  Alb  3.3  /  TBili  0.3  /  DBili  x   /  AST  19  /  ALT  24  /  AlkPhos  96  04-20    LIVER FUNCTIONS - ( 20 Apr 2024 07:07 )  Alb: 3.3 g/dL / Pro: 6.6 g/dL / ALK PHOS: 96 U/L / ALT: 24 U/L / AST: 19 U/L / GGT: x                                 9.5    7.06  )-----------( 450      ( 19 Apr 2024 03:56 )             30.7     PT/INR - ( 19 Apr 2024 03:56 )   PT: 11.4 sec;   INR: 1.09 ratio         PTT - ( 19 Apr 2024 03:56 )  PTT:31.5 sec

## 2024-04-20 NOTE — PROGRESS NOTE ADULT - ASSESSMENT
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 Smith presents with lower extremity pain. He was found to have a hematoma on the medial left thigh. The patient is hemodynamically stable with Hgb 12 g/dL and intact motor and sensory functions in the lower extremity. CTA demonstrating active subcutaneous and gracilis hematomas, large in size compared to CT performed four hours prior. S/p OR on 4/5 for LLE hematoma evacuation.    Plan:  - OR today for L thigh washout and vac placement  53M hx chronic DVTs and PEs on Lovenox 150 qd, s/p fasciotomy 2023 with Dr. Smith presents with lower extremity pain. He was found to have a hematoma on the medial left thigh. The patient is hemodynamically stable with Hgb 12 g/dL and intact motor and sensory functions in the lower extremity. CTA demonstrating active subcutaneous and gracilis hematomas, large in size compared to CT performed four hours prior. S/p OR on 4/5 for LLE hematoma evacuation. s/p OR on 4/19 for groin washout    Plan:  - s/p L thigh washout  - regular diet   - pain control PRN  - c/w abx given purulence draining from wound   - continue vac       Vascular Surgery  [id number]    - c/w abx given purulence draining from wound   - Change dressing as needed if soiled      Vascular Surgery  [id number]   53M hx chronic DVTs and PEs on Lovenox 150 qd, s/p fasciotomy 2023 with Dr. Smith presents with lower extremity pain. He was found to have a hematoma on the medial left thigh. The patient is hemodynamically stable with Hgb 12 g/dL and intact motor and sensory functions in the lower extremity. CTA demonstrating active subcutaneous and gracilis hematomas, large in size compared to CT performed four hours prior. S/p OR on 4/5 for LLE hematoma evacuation. s/p washout on 4/19    Plan:  - s/p L thigh washout  - regular diet   - pain control PRN  - c/w abx given purulence draining from wound. Would recommend continuing IV zosyn while inpt. May transition to PO augmentin for a 1 week course upon discharge.   - continue vac     Vascular Surgery   [phone]

## 2024-04-20 NOTE — PROGRESS NOTE ADULT - SUBJECTIVE AND OBJECTIVE BOX
Name of Patient : TAMI DUNHAM  MRN: [id number]        Subjective: Patient seen and examined. No new events except as noted.   S/P OR   washout  drain placed     REVIEW OF SYSTEMS:    CONSTITUTIONAL: No weakness, fevers or chills  EYES/ENT: No visual changes;  No vertigo or throat pain   NECK: No pain or stiffness  RESPIRATORY: No cough, wheezing, hemoptysis; No shortness of breath  CARDIOVASCULAR: No chest pain or palpitations  GASTROINTESTINAL: No abdominal or epigastric pain. No nausea, vomiting, or hematemesis; No diarrhea or constipation. No melena or hematochezia.  GENITOURINARY: No dysuria, frequency or hematuria  NEUROLOGICAL: No numbness or weakness  SKIN: No itching, burning, rashes, or lesions   All other review of systems is negative unless indicated above.    MEDICATIONS:  MEDICATIONS  (STANDING):  aspirin  chewable 81 milliGRAM(s) Oral daily  atorvastatin 20 milliGRAM(s) Oral at bedtime  gabapentin 300 milliGRAM(s) Oral every 8 hours  lacosamide 150 milliGRAM(s) Oral two times a day  multivitamin 1 Tablet(s) Oral daily  piperacillin/tazobactam IVPB.. 3.375 Gram(s) IV Intermittent every 8 hours  polyethylene glycol 3350 17 Gram(s) Oral daily  senna 2 Tablet(s) Oral at bedtime  tamsulosin 0.4 milliGRAM(s) Oral at bedtime      PHYSICAL EXAM:  T(C): 36.7 (04-20-24 @ 23:15), Max: 36.9 (04-20-24 @ 08:47)  HR: 64 (04-20-24 @ 23:15) (64 - 70)  BP: 108/72 (04-20-24 @ 23:15) (98/62 - 108/72)  RR: 18 (04-20-24 @ 23:15) (18 - 18)  SpO2: 100% (04-20-24 @ 23:15) (96% - 100%)  Wt(kg): --  I&O's Summary    19 Apr 2024 07:01  -  20 Apr 2024 07:00  --------------------------------------------------------  IN: 0 mL / OUT: 450 mL / NET: -450 mL    20 Apr 2024 07:01  -  21 Apr 2024 00:08  --------------------------------------------------------  IN: 240 mL / OUT: 300 mL / NET: -60 mL          Appearance: Normal	  HEENT:  PERRLA   Lymphatic: No lymphadenopathy   Cardiovascular: Normal S1 S2, no JVD  Respiratory: normal effort , clear  Gastrointestinal:  Soft, Non-tender  Skin: No rashes,  warm to touch  Psychiatry:  Mood & affect appropriate  Musculuskeletal: No edema    recent labs, Imaging and EKGs personally reviewed     04-19-24 @ 07:01  -  04-20-24 @ 07:00  --------------------------------------------------------  IN: 0 mL / OUT: 450 mL / NET: -450 mL    04-20-24 @ 07:01  -  04-21-24 @ 00:08  --------------------------------------------------------  IN: 240 mL / OUT: 300 mL / NET: -60 mL

## 2024-04-21 RX ORDER — ENOXAPARIN SODIUM 100 MG/ML
40 INJECTION SUBCUTANEOUS EVERY 24 HOURS
Refills: 0 | Status: DISCONTINUED | OUTPATIENT
Start: 2024-04-21 | End: 2024-04-27

## 2024-04-21 RX ADMIN — ATORVASTATIN CALCIUM 20 MILLIGRAM(S): 80 TABLET, FILM COATED ORAL at 21:12

## 2024-04-21 RX ADMIN — PIPERACILLIN AND TAZOBACTAM 25 GRAM(S): 4; .5 INJECTION, POWDER, LYOPHILIZED, FOR SOLUTION INTRAVENOUS at 05:28

## 2024-04-21 RX ADMIN — HYDROMORPHONE HYDROCHLORIDE 1 MILLIGRAM(S): 2 INJECTION INTRAMUSCULAR; INTRAVENOUS; SUBCUTANEOUS at 22:33

## 2024-04-21 RX ADMIN — ENOXAPARIN SODIUM 40 MILLIGRAM(S): 100 INJECTION SUBCUTANEOUS at 11:08

## 2024-04-21 RX ADMIN — TAMSULOSIN HYDROCHLORIDE 0.4 MILLIGRAM(S): 0.4 CAPSULE ORAL at 21:12

## 2024-04-21 RX ADMIN — POLYETHYLENE GLYCOL 3350 17 GRAM(S): 17 POWDER, FOR SOLUTION ORAL at 11:08

## 2024-04-21 RX ADMIN — LACOSAMIDE 150 MILLIGRAM(S): 50 TABLET ORAL at 05:29

## 2024-04-21 RX ADMIN — GABAPENTIN 300 MILLIGRAM(S): 400 CAPSULE ORAL at 21:12

## 2024-04-21 RX ADMIN — GABAPENTIN 300 MILLIGRAM(S): 400 CAPSULE ORAL at 13:22

## 2024-04-21 RX ADMIN — PIPERACILLIN AND TAZOBACTAM 25 GRAM(S): 4; .5 INJECTION, POWDER, LYOPHILIZED, FOR SOLUTION INTRAVENOUS at 21:12

## 2024-04-21 RX ADMIN — SENNA PLUS 2 TABLET(S): 8.6 TABLET ORAL at 21:12

## 2024-04-21 RX ADMIN — HYDROMORPHONE HYDROCHLORIDE 1 MILLIGRAM(S): 2 INJECTION INTRAMUSCULAR; INTRAVENOUS; SUBCUTANEOUS at 21:12

## 2024-04-21 RX ADMIN — Medication 1 TABLET(S): at 11:08

## 2024-04-21 RX ADMIN — LACOSAMIDE 150 MILLIGRAM(S): 50 TABLET ORAL at 17:19

## 2024-04-21 RX ADMIN — Medication 81 MILLIGRAM(S): at 11:08

## 2024-04-21 RX ADMIN — PIPERACILLIN AND TAZOBACTAM 25 GRAM(S): 4; .5 INJECTION, POWDER, LYOPHILIZED, FOR SOLUTION INTRAVENOUS at 13:22

## 2024-04-21 RX ADMIN — GABAPENTIN 300 MILLIGRAM(S): 400 CAPSULE ORAL at 05:28

## 2024-04-21 NOTE — PROGRESS NOTE ADULT - SUBJECTIVE AND OBJECTIVE BOX
Name of Patient : TAMI DUNHAM  MRN: [id number]  Date of visit: 04-21-24       Subjective: Patient seen and examined. No new events except as noted.   doing okay    REVIEW OF SYSTEMS:    CONSTITUTIONAL: No weakness, fevers or chills  EYES/ENT: No visual changes;  No vertigo or throat pain   NECK: No pain or stiffness  RESPIRATORY: No cough, wheezing, hemoptysis; No shortness of breath  CARDIOVASCULAR: No chest pain or palpitations  GASTROINTESTINAL: No abdominal or epigastric pain. No nausea, vomiting, or hematemesis; No diarrhea or constipation. No melena or hematochezia.  GENITOURINARY: No dysuria, frequency or hematuria  NEUROLOGICAL: No numbness or weakness  SKIN: No itching, burning, rashes, or lesions   All other review of systems is negative unless indicated above.    MEDICATIONS:  MEDICATIONS  (STANDING):  aspirin  chewable 81 milliGRAM(s) Oral daily  atorvastatin 20 milliGRAM(s) Oral at bedtime  enoxaparin Injectable 40 milliGRAM(s) SubCutaneous every 24 hours  gabapentin 300 milliGRAM(s) Oral every 8 hours  lacosamide 150 milliGRAM(s) Oral two times a day  multivitamin 1 Tablet(s) Oral daily  piperacillin/tazobactam IVPB.. 3.375 Gram(s) IV Intermittent every 8 hours  polyethylene glycol 3350 17 Gram(s) Oral daily  senna 2 Tablet(s) Oral at bedtime  tamsulosin 0.4 milliGRAM(s) Oral at bedtime      PHYSICAL EXAM:  T(C): 36.3 (04-21-24 @ 16:16), Max: 36.6 (04-21-24 @ 08:00)  HR: 67 (04-21-24 @ 16:16) (67 - 68)  BP: 97/63 (04-21-24 @ 16:16) (97/63 - 103/67)  RR: 18 (04-21-24 @ 16:16) (18 - 18)  SpO2: 99% (04-21-24 @ 16:16) (99% - 99%)  Wt(kg): --  I&O's Summary    20 Apr 2024 07:01  -  21 Apr 2024 07:00  --------------------------------------------------------  IN: 240 mL / OUT: 300 mL / NET: -60 mL    21 Apr 2024 07:01  -  21 Apr 2024 23:22  --------------------------------------------------------  IN: 200 mL / OUT: 0 mL / NET: 200 mL          Appearance: Normal	  HEENT:  PERRLA   Lymphatic: No lymphadenopathy   Cardiovascular: Normal S1 S2, no JVD  Respiratory: normal effort , clear  Gastrointestinal:  Soft, Non-tender  Skin: No rashes,  warm to touch  Psychiatry:  Mood & affect appropriate  Musculuskeletal: wound vac stable    recent labs, Imaging and EKGs personally reviewed     04-20-24 @ 07:01  -  04-21-24 @ 07:00  --------------------------------------------------------  IN: 240 mL / OUT: 300 mL / NET: -60 mL    04-21-24 @ 07:01  -  04-21-24 @ 23:22  --------------------------------------------------------  IN: 200 mL / OUT: 0 mL / NET: 200 mL                04-20    137  |  102  |  12  ----------------------------<  101<H>  4.4   |  23  |  0.82    Ca    9.0      20 Apr 2024 07:07    TPro  6.6  /  Alb  3.3  /  TBili  0.3  /  DBili  x   /  AST  19  /  ALT  24  /  AlkPhos  96  04-20                       Urinalysis Basic - ( 20 Apr 2024 07:07 )    Color: x / Appearance: x / SG: x / pH: x  Gluc: 101 mg/dL / Ketone: x  / Bili: x / Urobili: x   Blood: x / Protein: x / Nitrite: x   Leuk Esterase: x / RBC: x / WBC x   Sq Epi: x / Non Sq Epi: x / Bacteria: x

## 2024-04-21 NOTE — PROGRESS NOTE ADULT - ASSESSMENT
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 Smith presents with lower extremity pain. He was found to have a hematoma on the medial left thigh. The patient is hemodynamically stable with Hgb 12 g/dL and intact motor and sensory functions in the lower extremity. CTA demonstrating active subcutaneous and gracilis hematomas, large in size compared to CT performed four hours prior. S/p OR on 4/5 for LLE hematoma evacuation. s/p washout on 4/19    Plan:  - s/p L thigh washout  - regular diet   - pain control PRN  - c/w abx given purulence draining from wound. Would recommend continuing IV zosyn while inpt. May transition to PO augmentin for a 1 week course upon discharge.   - recommend vac team consult for vac  - amy vascular surgery fellow on behalf of attending    Vascular Surgery   [phone]

## 2024-04-21 NOTE — PROGRESS NOTE ADULT - SUBJECTIVE AND OBJECTIVE BOX
Subjective: Pt seen and examined at bedside with surgical team. denies L leg pain numbness, tingling, and pain.     Vital Signs Last 24 Hrs  T(C): 36.6 (21 Apr 2024 08:00), Max: 36.8 (20 Apr 2024 18:55)  T(F): 97.8 (21 Apr 2024 08:00), Max: 98.3 (20 Apr 2024 18:55)  HR: 68 (21 Apr 2024 08:00) (64 - 70)  BP: 103/67 (21 Apr 2024 08:00) (101/68 - 108/72)  BP(mean): --  RR: 18 (21 Apr 2024 08:00) (18 - 18)  SpO2: 99% (21 Apr 2024 08:00) (97% - 100%)    Parameters below as of 21 Apr 2024 08:00  Patient On (Oxygen Delivery Method): room air        I&O's Detail    20 Apr 2024 07:01  -  21 Apr 2024 07:00  --------------------------------------------------------  IN:    Oral Fluid: 240 mL  Total IN: 240 mL    OUT:    Voided (mL): 300 mL  Total OUT: 300 mL    Total NET: -60 mL      MEDICATIONS  (STANDING):  aspirin  chewable 81 milliGRAM(s) Oral daily  atorvastatin 20 milliGRAM(s) Oral at bedtime  gabapentin 300 milliGRAM(s) Oral every 8 hours  lacosamide 150 milliGRAM(s) Oral two times a day  multivitamin 1 Tablet(s) Oral daily  piperacillin/tazobactam IVPB.. 3.375 Gram(s) IV Intermittent every 8 hours  polyethylene glycol 3350 17 Gram(s) Oral daily  senna 2 Tablet(s) Oral at bedtime  tamsulosin 0.4 milliGRAM(s) Oral at bedtime    MEDICATIONS  (PRN):  bacitracin   Ointment 1 Application(s) Topical daily PRN daily dressing change  HYDROmorphone  Injectable 1 milliGRAM(s) IV Push every 6 hours PRN Moderate Pain (4 - 6)  meclizine 12.5 milliGRAM(s) Oral three times a day PRN Dizziness      LABS:    04-20    137  |  102  |  12  ----------------------------<  101<H>  4.4   |  23  |  0.82    Ca    9.0      20 Apr 2024 07:07    TPro  6.6  /  Alb  3.3  /  TBili  0.3  /  DBili  x   /  AST  19  /  ALT  24  /  AlkPhos  96  04-20      LIVER FUNCTIONS - ( 20 Apr 2024 07:07 )  Alb: 3.3 g/dL / Pro: 6.6 g/dL / ALK PHOS: 96 U/L / ALT: 24 U/L / AST: 19 U/L / GGT: x           Urinalysis Basic - ( 20 Apr 2024 07:07 )    Color: x / Appearance: x / SG: x / pH: x  Gluc: 101 mg/dL / Ketone: x  / Bili: x / Urobili: x   Blood: x / Protein: x / Nitrite: x   Leuk Esterase: x / RBC: x / WBC x   Sq Epi: x / Non Sq Epi: x / Bacteria: x    Exam:  General: NAD  LLE: vac holding suction, palp femoral, popliteal, and DP/PT pulses

## 2024-04-21 NOTE — PROGRESS NOTE ADULT - SUBJECTIVE AND OBJECTIVE BOX
Neurology        S: patient seen.  doing okay ;  s/p OR with new wound vac         Medications: MEDICATIONS  (STANDING):  aspirin  chewable 81 milliGRAM(s) Oral daily  atorvastatin 20 milliGRAM(s) Oral at bedtime  enoxaparin Injectable 40 milliGRAM(s) SubCutaneous every 24 hours  gabapentin 300 milliGRAM(s) Oral every 8 hours  lacosamide 150 milliGRAM(s) Oral two times a day  multivitamin 1 Tablet(s) Oral daily  piperacillin/tazobactam IVPB.. 3.375 Gram(s) IV Intermittent every 8 hours  polyethylene glycol 3350 17 Gram(s) Oral daily  senna 2 Tablet(s) Oral at bedtime  tamsulosin 0.4 milliGRAM(s) Oral at bedtime    MEDICATIONS  (PRN):  bacitracin   Ointment 1 Application(s) Topical daily PRN daily dressing change  HYDROmorphone  Injectable 1 milliGRAM(s) IV Push every 6 hours PRN Moderate Pain (4 - 6)  meclizine 12.5 milliGRAM(s) Oral three times a day PRN Dizziness       Vitals:  Vital Signs Last 24 Hrs  T(C): 36.6 (21 Apr 2024 08:00), Max: 36.8 (20 Apr 2024 18:55)  T(F): 97.8 (21 Apr 2024 08:00), Max: 98.3 (20 Apr 2024 18:55)  HR: 68 (21 Apr 2024 08:00) (64 - 70)  BP: 103/67 (21 Apr 2024 08:00) (101/68 - 108/72)  BP(mean): --  RR: 18 (21 Apr 2024 08:00) (18 - 18)  SpO2: 99% (21 Apr 2024 08:00) (97% - 100%)    Parameters below as of 21 Apr 2024 08:00  Patient On (Oxygen Delivery Method): room air                General Exam:   General Appearance: Appropriately dressed and in no acute distress       Head: Normocephalic, atraumatic and no dysmorphic features  Ear, Nose, and Throat: Moist mucous membranes  CVS: S1S2+  Resp: No SOB, no wheeze or rhonchi  GI: soft NT/ND  Extremities: No edema or cyanosis L leg thigh hematoma   Skin: No bruises or rashes     Neurological Exam:  Mental Status: Awake, alert and oriented x 3 minimal WFD .  Able to follow simple and complex verbal commands. Able to name and repeat. fluent speech. No obvious aphasia or dysarthria noted.   Cranial Nerves: PERRL, EOMI, VFFC, sensation V1-V3 intact,  no obvious facial asymmetry, equal elevation of palate, scm/trap 5/5, tongue is midline on protrusion. no obvious papilledema on fundoscopic exam. hearing is grossly intact.   Motor: Normal bulk, tone and strength throughout. Fine finger movements were intact and symmetric. no tremors or drift noted.    Sensation: Intact to light touch and pinprick throughout. no right/left confusion. no extinction to tactile on DSS.  .   Reflexes: 1+ throughout at biceps, brachioradialis, triceps, patellars and ankles bilaterally and equal. No clonus. R toe and L toe were both downgoing.  Coordination: No dysmetria on FNF    Gait: deferred     Data/Labs/Imaging which I personally reviewed.           LABS:      04-20    137  |  102  |  12  ----------------------------<  101<H>  4.4   |  23  |  0.82    Ca    9.0      20 Apr 2024 07:07    TPro  6.6  /  Alb  3.3  /  TBili  0.3  /  DBili  x   /  AST  19  /  ALT  24  /  AlkPhos  96  04-20    LIVER FUNCTIONS - ( 20 Apr 2024 07:07 )  Alb: 3.3 g/dL / Pro: 6.6 g/dL / ALK PHOS: 96 U/L / ALT: 24 U/L / AST: 19 U/L / GGT: x             Urinalysis Basic - ( 20 Apr 2024 07:07 )    Color: x / Appearance: x / SG: x / pH: x  Gluc: 101 mg/dL / Ketone: x  / Bili: x / Urobili: x   Blood: x / Protein: x / Nitrite: x   Leuk Esterase: x / RBC: x / WBC x   Sq Epi: x / Non Sq Epi: x / Bacteria: x

## 2024-04-21 NOTE — PROGRESS NOTE ADULT - ASSESSMENT
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 Noticed within the last hour. No falls or trauma to the area. Administered Lovenox just prior to arrival. Denies fevers, chills, nausea, vomiting, urinary symptoms. No recent surgeries  found left thigh hematoma   s/p 2 units of PRBC   s/p hematoma evacuation 4/5, now doing well  s/p PRBC again   s/p OR 4/19 now with wound vac     Impression:   1) chronic strokes, resid minimal WFD  2) seizure d/o 2/2 strokes  3) ADHD  4) vertigo BPPV, stable   5) new L thigh hematoma   6) LLE compartment syndrome     - back on ASA 81mg daily and lovenox 40mg      - monitor H/H.      - monitor for compartment syndrome   - Lovenox full dose now held.  was injecting in thigh at site of hematoma ; last admission Lovenox started for DVT and changed to BID dosing   - for ADHD gets Vyvanse 50mg daily  - for seizure he is on vimpat 150 mg BID  - statin therapy with LDL goal < 70mg/dL; atorvastatin 20 at home   - meclizline PRN   - PT/OT   - check FS, glucose control <180  - GI/DVT ppx   - Thank you for allowing me to participate in the care of this patient. Call with questions.      Braxton Forde MD  Vascular Neurology  Office: [phone]

## 2024-04-22 LAB
-  AMOXICILLIN/CLAVULANIC ACID: SIGNIFICANT CHANGE UP
-  AMPICILLIN/SULBACTAM: SIGNIFICANT CHANGE UP
-  AMPICILLIN: SIGNIFICANT CHANGE UP
-  AMPICILLIN: SIGNIFICANT CHANGE UP
-  AZTREONAM: SIGNIFICANT CHANGE UP
-  CEFAZOLIN: SIGNIFICANT CHANGE UP
-  CEFEPIME: SIGNIFICANT CHANGE UP
-  CEFOXITIN: SIGNIFICANT CHANGE UP
-  CEFTRIAXONE: SIGNIFICANT CHANGE UP
-  CIPROFLOXACIN: SIGNIFICANT CHANGE UP
-  CLINDAMYCIN: SIGNIFICANT CHANGE UP
-  ERTAPENEM: SIGNIFICANT CHANGE UP
-  ERYTHROMYCIN: SIGNIFICANT CHANGE UP
-  GENTAMICIN: SIGNIFICANT CHANGE UP
-  GENTAMICIN: SIGNIFICANT CHANGE UP
-  IMIPENEM: SIGNIFICANT CHANGE UP
-  LEVOFLOXACIN: SIGNIFICANT CHANGE UP
-  MEROPENEM: SIGNIFICANT CHANGE UP
-  OXACILLIN: SIGNIFICANT CHANGE UP
-  PENICILLIN: SIGNIFICANT CHANGE UP
-  PIPERACILLIN/TAZOBACTAM: SIGNIFICANT CHANGE UP
-  RIFAMPIN: SIGNIFICANT CHANGE UP
-  TETRACYCLINE: SIGNIFICANT CHANGE UP
-  TOBRAMYCIN: SIGNIFICANT CHANGE UP
-  TRIMETHOPRIM/SULFAMETHOXAZOLE: SIGNIFICANT CHANGE UP
-  TRIMETHOPRIM/SULFAMETHOXAZOLE: SIGNIFICANT CHANGE UP
-  VANCOMYCIN: SIGNIFICANT CHANGE UP
-  VANCOMYCIN: SIGNIFICANT CHANGE UP
ANION GAP SERPL CALC-SCNC: 12 MMOL/L — SIGNIFICANT CHANGE UP (ref 5–17)
BUN SERPL-MCNC: 12 MG/DL — SIGNIFICANT CHANGE UP (ref 7–23)
CALCIUM SERPL-MCNC: 9.3 MG/DL — SIGNIFICANT CHANGE UP (ref 8.4–10.5)
CHLORIDE SERPL-SCNC: 104 MMOL/L — SIGNIFICANT CHANGE UP (ref 96–108)
CO2 SERPL-SCNC: 26 MMOL/L — SIGNIFICANT CHANGE UP (ref 22–31)
CREAT SERPL-MCNC: 1.1 MG/DL — SIGNIFICANT CHANGE UP (ref 0.5–1.3)
EGFR: 80 ML/MIN/1.73M2 — SIGNIFICANT CHANGE UP
GLUCOSE SERPL-MCNC: 102 MG/DL — HIGH (ref 70–99)
HCT VFR BLD CALC: 34.2 % — LOW (ref 39–50)
HGB BLD-MCNC: 10.7 G/DL — LOW (ref 13–17)
MAGNESIUM SERPL-MCNC: 2.3 MG/DL — SIGNIFICANT CHANGE UP (ref 1.6–2.6)
MCHC RBC-ENTMCNC: 28 PG — SIGNIFICANT CHANGE UP (ref 27–34)
MCHC RBC-ENTMCNC: 31.3 GM/DL — LOW (ref 32–36)
MCV RBC AUTO: 89.5 FL — SIGNIFICANT CHANGE UP (ref 80–100)
METHOD TYPE: SIGNIFICANT CHANGE UP
NRBC # BLD: 0 /100 WBCS — SIGNIFICANT CHANGE UP (ref 0–0)
PHOSPHATE SERPL-MCNC: 3.3 MG/DL — SIGNIFICANT CHANGE UP (ref 2.5–4.5)
PLATELET # BLD AUTO: 491 K/UL — HIGH (ref 150–400)
POTASSIUM SERPL-MCNC: 4 MMOL/L — SIGNIFICANT CHANGE UP (ref 3.5–5.3)
POTASSIUM SERPL-SCNC: 4 MMOL/L — SIGNIFICANT CHANGE UP (ref 3.5–5.3)
RBC # BLD: 3.82 M/UL — LOW (ref 4.2–5.8)
RBC # FLD: 13.6 % — SIGNIFICANT CHANGE UP (ref 10.3–14.5)
SODIUM SERPL-SCNC: 142 MMOL/L — SIGNIFICANT CHANGE UP (ref 135–145)
WBC # BLD: 6.78 K/UL — SIGNIFICANT CHANGE UP (ref 3.8–10.5)
WBC # FLD AUTO: 6.78 K/UL — SIGNIFICANT CHANGE UP (ref 3.8–10.5)

## 2024-04-22 PROCEDURE — 99222 1ST HOSP IP/OBS MODERATE 55: CPT

## 2024-04-22 RX ORDER — ACETAMINOPHEN 500 MG
650 TABLET ORAL ONCE
Refills: 0 | Status: COMPLETED | OUTPATIENT
Start: 2024-04-22 | End: 2024-04-22

## 2024-04-22 RX ORDER — SODIUM HYPOCHLORITE 0.125 %
1 SOLUTION, NON-ORAL MISCELLANEOUS DAILY
Refills: 0 | Status: DISCONTINUED | OUTPATIENT
Start: 2024-04-22 | End: 2024-04-27

## 2024-04-22 RX ORDER — CHLORHEXIDINE GLUCONATE 213 G/1000ML
1 SOLUTION TOPICAL DAILY
Refills: 0 | Status: DISCONTINUED | OUTPATIENT
Start: 2024-04-22 | End: 2024-04-27

## 2024-04-22 RX ADMIN — Medication 1 TABLET(S): at 11:23

## 2024-04-22 RX ADMIN — HYDROMORPHONE HYDROCHLORIDE 1 MILLIGRAM(S): 2 INJECTION INTRAMUSCULAR; INTRAVENOUS; SUBCUTANEOUS at 14:20

## 2024-04-22 RX ADMIN — ENOXAPARIN SODIUM 40 MILLIGRAM(S): 100 INJECTION SUBCUTANEOUS at 11:23

## 2024-04-22 RX ADMIN — LACOSAMIDE 150 MILLIGRAM(S): 50 TABLET ORAL at 05:03

## 2024-04-22 RX ADMIN — SENNA PLUS 2 TABLET(S): 8.6 TABLET ORAL at 21:29

## 2024-04-22 RX ADMIN — PIPERACILLIN AND TAZOBACTAM 25 GRAM(S): 4; .5 INJECTION, POWDER, LYOPHILIZED, FOR SOLUTION INTRAVENOUS at 05:03

## 2024-04-22 RX ADMIN — LACOSAMIDE 150 MILLIGRAM(S): 50 TABLET ORAL at 17:12

## 2024-04-22 RX ADMIN — HYDROMORPHONE HYDROCHLORIDE 1 MILLIGRAM(S): 2 INJECTION INTRAMUSCULAR; INTRAVENOUS; SUBCUTANEOUS at 15:20

## 2024-04-22 RX ADMIN — Medication 81 MILLIGRAM(S): at 11:22

## 2024-04-22 RX ADMIN — HYDROMORPHONE HYDROCHLORIDE 1 MILLIGRAM(S): 2 INJECTION INTRAMUSCULAR; INTRAVENOUS; SUBCUTANEOUS at 09:13

## 2024-04-22 RX ADMIN — ATORVASTATIN CALCIUM 20 MILLIGRAM(S): 80 TABLET, FILM COATED ORAL at 21:29

## 2024-04-22 RX ADMIN — Medication 650 MILLIGRAM(S): at 21:29

## 2024-04-22 RX ADMIN — GABAPENTIN 300 MILLIGRAM(S): 400 CAPSULE ORAL at 13:23

## 2024-04-22 RX ADMIN — TAMSULOSIN HYDROCHLORIDE 0.4 MILLIGRAM(S): 0.4 CAPSULE ORAL at 21:29

## 2024-04-22 RX ADMIN — HYDROMORPHONE HYDROCHLORIDE 1 MILLIGRAM(S): 2 INJECTION INTRAMUSCULAR; INTRAVENOUS; SUBCUTANEOUS at 10:13

## 2024-04-22 RX ADMIN — POLYETHYLENE GLYCOL 3350 17 GRAM(S): 17 POWDER, FOR SOLUTION ORAL at 11:23

## 2024-04-22 RX ADMIN — GABAPENTIN 300 MILLIGRAM(S): 400 CAPSULE ORAL at 05:03

## 2024-04-22 RX ADMIN — PIPERACILLIN AND TAZOBACTAM 25 GRAM(S): 4; .5 INJECTION, POWDER, LYOPHILIZED, FOR SOLUTION INTRAVENOUS at 13:24

## 2024-04-22 RX ADMIN — GABAPENTIN 300 MILLIGRAM(S): 400 CAPSULE ORAL at 21:29

## 2024-04-22 RX ADMIN — Medication 650 MILLIGRAM(S): at 21:59

## 2024-04-22 RX ADMIN — CHLORHEXIDINE GLUCONATE 1 APPLICATION(S): 213 SOLUTION TOPICAL at 11:58

## 2024-04-22 RX ADMIN — PIPERACILLIN AND TAZOBACTAM 25 GRAM(S): 4; .5 INJECTION, POWDER, LYOPHILIZED, FOR SOLUTION INTRAVENOUS at 21:30

## 2024-04-22 NOTE — CONSULT NOTE ADULT - SUBJECTIVE AND OBJECTIVE BOX
HPI:  53 m with HLD, CVA with residual expressive aphasia, bilateral DVTs on chronic Lovenox SQ (150Qd injecting in thighs) compartment syndrome s/p L fasciotomy, presented 4/4 with sudden L thigh edema, afebrile, WBC: 12=> 16 with drop in Hgb, CT showed large L thigh hematoma with active hemorrhage and small R thigh hematoma with?gas  s/p OR evacuation of hematoma 4/5, 1L clot removed  again had drop in Hgb 4/13  on 4/18 pt found to have purulent drainage from the wound, s/p washout 4/19, cx with E-coli, staph aureus E-faecalis, E-avium  pt afebrile, no WBC      PAST MEDICAL & SURGICAL HISTORY:  History of TIAs      CVA (cerebrovascular accident)      HLD (hyperlipidemia)      Vertigo      Colon polyp      Seizure disorder      History of fasciotomy      H/O arthroscopy of knee      S/P excision of neuroma      History of loop recorder          Allergies    No Known Allergies    Intolerances        ANTIMICROBIALS:  piperacillin/tazobactam IVPB.. 3.375 every 8 hours      OTHER MEDS:  aspirin  chewable 81 milliGRAM(s) Oral daily  atorvastatin 20 milliGRAM(s) Oral at bedtime  bacitracin   Ointment 1 Application(s) Topical daily PRN  chlorhexidine 2% Cloths 1 Application(s) Topical daily  Dakins Solution - 1/4 Strength 1 Application(s) Topical daily  enoxaparin Injectable 40 milliGRAM(s) SubCutaneous every 24 hours  gabapentin 300 milliGRAM(s) Oral every 8 hours  HYDROmorphone  Injectable 1 milliGRAM(s) IV Push every 6 hours PRN  lacosamide 150 milliGRAM(s) Oral two times a day  meclizine 12.5 milliGRAM(s) Oral three times a day PRN  multivitamin 1 Tablet(s) Oral daily  polyethylene glycol 3350 17 Gram(s) Oral daily  senna 2 Tablet(s) Oral at bedtime  tamsulosin 0.4 milliGRAM(s) Oral at bedtime      SOCIAL HISTORY:  lives alone, works as a musician   no smoking, alcohol or drug abuse  no recent travel    FAMILY HISTORY:  No recent febrile illness in family members        ROS:    All other systems negative     Constitutional: no fever, no chills, no weight loss, no night sweats  Eye: no eye pain, no redness, no vision changes  ENT:  no sore throat, no rhinorrhea  Cardiovascular:  no chest pain, no palpitation  Respiratory:  no SOB, no cough  GI:  no abd pain, no vomiting, no diarrhea  urinary: no dysuria, no hematuria, no flank pain  : no  scrotal pain  musculoskeletal: improved L thigh edema   skin:  no rash  neurology:  no headache, no seizure, no change in mental status  psych: no anxiety, no depression     Physical Exam:    General:    NAD, non toxic  Head: atraumatic, normocephalic  Eyes: normal sclera and conjunctiva  ENT:   no oropharyngeal lesions, no LAD, neck supple  Cardio:    regular S1,S2  Respiratory:   clear b/l, no wheezing  abd:   soft, BS +, not tender  :     no CVAT, no suprapubic tenderness, no fish  Musculoskeletal : L thigh wound clean, some surrounding edema  Skin:    no rash  vascular: no phlebitis  Neurologic:     no focal deficits  psych: normal affect      Drug Dosing Weight  Height (cm): 190.5 (19 Apr 2024 17:32)  Weight (kg): 98.2 (19 Apr 2024 17:32)  BMI (kg/m2): 27.1 (19 Apr 2024 17:32)  BSA (m2): 2.27 (19 Apr 2024 17:32)    Vital Signs Last 24 Hrs  T(F): 98.2 (04-22-24 @ 08:59), Max: 99.1 (04-16-24 @ 04:00)    Vital Signs Last 24 Hrs  HR: 70 (04-22-24 @ 08:59) (65 - 70)  BP: 107/65 (04-22-24 @ 08:59) (97/63 - 107/65)  RR: 18 (04-22-24 @ 08:59)  SpO2: 97% (04-22-24 @ 08:59) (97% - 99%)  Wt(kg): --                          10.7   6.78  )-----------( 491      ( 22 Apr 2024 09:07 )             34.2       04-22    142  |  104  |  12  ----------------------------<  102<H>  4.0   |  26  |  1.10    Ca    9.3      22 Apr 2024 09:07  Phos  3.3     04-22  Mg     2.3     04-22        Urinalysis Basic - ( 22 Apr 2024 09:07 )    Color: x / Appearance: x / SG: x / pH: x  Gluc: 102 mg/dL / Ketone: x  / Bili: x / Urobili: x   Blood: x / Protein: x / Nitrite: x   Leuk Esterase: x / RBC: x / WBC x   Sq Epi: x / Non Sq Epi: x / Bacteria: x        MICROBIOLOGY:  v  .Surgical Swab Left Thigh Hematoma  04-19-24   Moderate Escherichia coli  Few Enterococcus faecalis  Rare Staphylococcus aureus  Rare Enterococcus avium  --  --                  RADIOLOGY:    Images independently visualized and reviewed personally,  findings as below  < from: CT Angio Lower Extremity w/ IV Cont, Left (04.13.24 @ 21:59) >  IMPRESSION:  Evolving hematoma in the medial left thigh is substantially decreased in   size compared with CT 4/5/2024 and without evidence for active hemorrhage.      < end of copied text >  < from: [facility] Duplex Lower Ext Vein Scan, Bilat (04.06.24 @ 20:11) >  IMPRESSION:  No evidence of deep venous thrombosis in either lower extremity.    < end of copied text >  < from: CT Angio Lower Extremity w/ IV Cont, Left (04.05.24 @ 06:23) >  IMPRESSION:  Enlarging left anteromedial upper thigh subcutaneous hematoma with   increased mass effect on the medial compartment musculature. No evidence   of arterial contrast extravasation on this exam.    < end of copied text >

## 2024-04-22 NOTE — CONSULT NOTE ADULT - PROVIDER SPECIALTY LIST ADULT
Orthopedics
Rehab Medicine
Intervent Radiology
Plastic Surgery
Intervent Radiology
Neurology
Infectious Disease
Vascular Surgery
Wound Care

## 2024-04-22 NOTE — PROGRESS NOTE ADULT - ASSESSMENT
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 On admission, vitals significant for hypotension and labs showing anemia with CTA showing large hematoma of the LLE and small RLE hematoma with repeat CTA showing interval increase of the hematoma and new small LLE hematoma s/p 2units pRBCs. He is now admitted to the MICU for further management.       # LE Hematoma, Anemia   - 4/4 CT A w/ Mod- large SubC hematoma in the anteromedial L upper thigh containing scattered foci of high attenuation concerning for active hemorrhage within the hematoma; Small collection in the anterior upper right thigh suspicious for a hematoma. Mild subcutaneous inflammatory change in the visualized anterior and lateral upper right thigh with associated foci of subcutaneous gas.   - 4/5 CT w/ Interval increase in size of an anteromedial L upper thigh subc  hematoma with scattered foci of high attenuation within the hematoma compatible with active hemorrhage; New left gracilis intramuscular hematoma which could be extending from subcutaneous hematoma.  - Duplex negative for DVT   - S/P Hematoma evacuation with Vascular on 4/5. Drains DC'd as per Vascular   - Pt with episode of worsening LLE edema 4/13, drop in Hgb. ASA and Lovenox held. S/P 2 units of PRBCs   - R/O Compartment syndrome. CT LE w/ evolving hematoma of medial L thigh, decreased in side, negative for active hemorrhage   - Pulse checks per protocol;  Monitor LE closely  - IR, Vascular, MICU, Wound care appreciated   - Plastic surgery eval appreciated; F/u recs --> wound is not yet amenable to reconstitution. Outpatient PSx follow up upon DC   - Seen by IR 4/16, no drainage offered, recommended for conservative management with wound care for L groin hematoma   - S/P OR with Vascular for wash out on 4/19. Now with wound Vac, management per Vascular   - Lovenox PPX dose resumed. Monitor H/H     Skin infection   - Surgical swab w. + E. coli, E. faecalis Staph aureus, rare Enterococcus avium   - on Zosyn for ABX, S  - ID eval consulted; F/u recs     #Leukocytosis  - Noted to have elevated WBC   - Trend WBC and fever curve, If febrile, check pan cultures --> Down-trending     #Seizure disorder  # CVA with residual expressive aphasia  - C/w home AEDs (lacosamide and vyanese)  - C/w home gabapentin   - Seizure precautions  - Neuro eval appreciated; F/u recs     #HLD  - C/w home Lipitor    #BPH  - C/w flomax    #DVTs  - hx of bilateral DVTs --> Repeat duplex negative       PPX  - Lovenox   Discussed with Attending.

## 2024-04-22 NOTE — CONSULT NOTE ADULT - ASSESSMENT
53 m with HLD, CVA with residual expressive aphasia, bilateral DVTs on chronic Lovenox SQ (150Qd injecting in thighs) compartment syndrome s/p L fasciotomy, presented 4/4 with sudden L thigh edema, afebrile, WBC: 12=> 16 with drop in Hgb, CT showed large L thigh hematoma with active hemorrhage and small R thigh hematoma with?gas  s/p OR evacuation of hematoma 4/5, 1L clot removed  again had drop in Hgb 4/13  on 4/18 pt found to have purulent drainage from the wound, s/p washout 4/19, cx with E-coli, staph aureus E-faecalis, E-avium  pt afebrile, no WBC      hematoma of R thigh s/p evacuation 4/5 and then infected s/p washout 4/19 cx with E-coli, staph aureus E-faecalis, E-avium    * pt is improving on zosyn, will continue  * f/u the sensitivities and will adjust  * monitor CBC/diff and CMP    The above assessment and plan was discussed with the primary team    Lilian Harmon MD  contact on teams  After 5pm and on weekends call [phone]

## 2024-04-22 NOTE — PROGRESS NOTE ADULT - SUBJECTIVE AND OBJECTIVE BOX
Neurology        S: patient seen.  doing okay ;  s/p OR with new wound vac         Medications: MEDICATIONS  (STANDING):  aspirin  chewable 81 milliGRAM(s) Oral daily  atorvastatin 20 milliGRAM(s) Oral at bedtime  chlorhexidine 2% Cloths 1 Application(s) Topical daily  enoxaparin Injectable 40 milliGRAM(s) SubCutaneous every 24 hours  gabapentin 300 milliGRAM(s) Oral every 8 hours  lacosamide 150 milliGRAM(s) Oral two times a day  multivitamin 1 Tablet(s) Oral daily  piperacillin/tazobactam IVPB.. 3.375 Gram(s) IV Intermittent every 8 hours  polyethylene glycol 3350 17 Gram(s) Oral daily  senna 2 Tablet(s) Oral at bedtime  tamsulosin 0.4 milliGRAM(s) Oral at bedtime    MEDICATIONS  (PRN):  bacitracin   Ointment 1 Application(s) Topical daily PRN daily dressing change  HYDROmorphone  Injectable 1 milliGRAM(s) IV Push every 6 hours PRN Moderate Pain (4 - 6)  meclizine 12.5 milliGRAM(s) Oral three times a day PRN Dizziness       Vitals:  Vital Signs Last 24 Hrs  T(C): 36.8 (22 Apr 2024 08:59), Max: 36.8 (22 Apr 2024 08:59)  T(F): 98.2 (22 Apr 2024 08:59), Max: 98.2 (22 Apr 2024 08:59)  HR: 70 (22 Apr 2024 08:59) (65 - 70)  BP: 107/65 (22 Apr 2024 08:59) (97/63 - 107/65)  BP(mean): --  RR: 18 (22 Apr 2024 08:59) (18 - 18)  SpO2: 97% (22 Apr 2024 08:59) (97% - 99%)    Parameters below as of 22 Apr 2024 08:59  Patient On (Oxygen Delivery Method): room air               General Exam:   General Appearance: Appropriately dressed and in no acute distress       Head: Normocephalic, atraumatic and no dysmorphic features  Ear, Nose, and Throat: Moist mucous membranes  CVS: S1S2+  Resp: No SOB, no wheeze or rhonchi  GI: soft NT/ND  Extremities: No edema or cyanosis L leg thigh hematoma   Skin: No bruises or rashes     Neurological Exam:  Mental Status: Awake, alert and oriented x 3 minimal WFD .  Able to follow simple and complex verbal commands. Able to name and repeat. fluent speech. No obvious aphasia or dysarthria noted.   Cranial Nerves: PERRL, EOMI, VFFC, sensation V1-V3 intact,  no obvious facial asymmetry, equal elevation of palate, scm/trap 5/5, tongue is midline on protrusion. no obvious papilledema on fundoscopic exam. hearing is grossly intact.   Motor: Normal bulk, tone and strength throughout. Fine finger movements were intact and symmetric. no tremors or drift noted.    Sensation: Intact to light touch and pinprick throughout. no right/left confusion. no extinction to tactile on DSS.  .   Reflexes: 1+ throughout at biceps, brachioradialis, triceps, patellars and ankles bilaterally and equal. No clonus. R toe and L toe were both downgoing.  Coordination: No dysmetria on FNF    Gait: deferred     Data/Labs/Imaging which I personally reviewed.             LABS:                          10.7   6.78  )-----------( 491      ( 22 Apr 2024 09:07 )             34.2

## 2024-04-22 NOTE — CONSULT NOTE ADULT - CONSULT REQUESTED DATE/TIME
12-Apr-2024 08:30
16-Apr-2024 14:46
09-Apr-2024 10:32
03-Apr-2024
16-Apr-2024 17:26
04-Apr-2024 07:23
04-Apr-2024 09:39
04-Apr-2024 09:00
22-Apr-2024 12:27

## 2024-04-22 NOTE — PROGRESS NOTE ADULT - SUBJECTIVE AND OBJECTIVE BOX
Name of Patient : TAMI DUNHAM  MRN: [id number]  Date of visit: 04-22-24 @ 13:31      Subjective: Patient seen and examined. No new events except as noted.   Patient seen earlier this AM. Was ambulating with PT this AM. States that his pain is overall controlled.   Lovenox resumed. Continues on ASA.   ID eval consulted    REVIEW OF SYSTEMS:    CONSTITUTIONAL: Generalized weakness   EYES/ENT: No visual changes;  No vertigo or throat pain   NECK: No pain or stiffness  RESPIRATORY: No cough, wheezing, hemoptysis; No shortness of breath  CARDIOVASCULAR: No chest pain or palpitations  GASTROINTESTINAL: No abdominal or epigastric pain. No nausea, vomiting, or hematemesis; No diarrhea or constipation. No melena or hematochezia.  GENITOURINARY: No dysuria, frequency or hematuria  NEUROLOGICAL: No numbness or weakness  SKIN/ MSK: LLE discomfort; S/P OR for washout; + Wound Vac  All other review of systems is negative unless indicated above.    MEDICATIONS:  MEDICATIONS  (STANDING):  aspirin  chewable 81 milliGRAM(s) Oral daily  atorvastatin 20 milliGRAM(s) Oral at bedtime  chlorhexidine 2% Cloths 1 Application(s) Topical daily  Dakins Solution - 1/4 Strength 1 Application(s) Topical daily  enoxaparin Injectable 40 milliGRAM(s) SubCutaneous every 24 hours  gabapentin 300 milliGRAM(s) Oral every 8 hours  lacosamide 150 milliGRAM(s) Oral two times a day  multivitamin 1 Tablet(s) Oral daily  piperacillin/tazobactam IVPB.. 3.375 Gram(s) IV Intermittent every 8 hours  polyethylene glycol 3350 17 Gram(s) Oral daily  senna 2 Tablet(s) Oral at bedtime  tamsulosin 0.4 milliGRAM(s) Oral at bedtime      PHYSICAL EXAM:  T(C): 36.8 (04-22-24 @ 08:59), Max: 36.8 (04-22-24 @ 08:59)  HR: 70 (04-22-24 @ 08:59) (65 - 70)  BP: 107/65 (04-22-24 @ 08:59) (97/63 - 107/65)  RR: 18 (04-22-24 @ 08:59) (18 - 18)  SpO2: 97% (04-22-24 @ 08:59) (97% - 99%)  Wt(kg): --  I&O's Summary    21 Apr 2024 07:01  -  22 Apr 2024 07:00  --------------------------------------------------------  IN: 200 mL / OUT: 600 mL / NET: -400 mL          Appearance: Awake, sitting up in bed, generalized weakness 	  HEENT:  Eyes are open   Lymphatic: No lymphadenopathy grossly   Cardiovascular: Normal    Respiratory: normal effort , clear  Gastrointestinal:  Soft, Non-tender  Skin: No rashes,  warm to touch  Psychiatry:  Mood & affect appropriate  Musculoskeletal: LLE Wound vac          04-21-24 @ 07:01  -  04-22-24 @ 07:00  --------------------------------------------------------  IN: 200 mL / OUT: 600 mL / NET: -400 mL                              10.7   6.78  )-----------( 491      ( 22 Apr 2024 09:07 )             34.2               04-22    142  |  104  |  12  ----------------------------<  102<H>  4.0   |  26  |  1.10    Ca    9.3      22 Apr 2024 09:07  Phos  3.3     04-22  Mg     2.3     04-22                         Urinalysis Basic - ( 22 Apr 2024 09:07 )    Color: x / Appearance: x / SG: x / pH: x  Gluc: 102 mg/dL / Ketone: x  / Bili: x / Urobili: x   Blood: x / Protein: x / Nitrite: x   Leuk Esterase: x / RBC: x / WBC x   Sq Epi: x / Non Sq Epi: x / Bacteria: x        Culture - Acid Fast - Other w/Smear (04.19.24 @ 22:10)   Specimen Source: .Other Left Thigh Hematoma  Acid Fast Bacilli Smear: No acid-fast bacilli seen by fluorochrome stain     Culture - Fungal, Other (04.19.24 @ 22:10)   Specimen Source: .Other Left Thigh Hematoma  Culture Results: Testing in progress    Culture - Surgical Swab (04.19.24 @ 22:10)       - Piperacillin/Tazobactam: S <=8     Specimen Source: .Surgical Swab Left Thigh Hematoma  Culture Results:   Moderate Escherichia coli   Few Enterococcus faecalis   Rare Staphylococcus aureus   Rare Enterococcus avium Susceptibility to follow.  Organism Identification: Escherichia coli   Enterococcus faecalis   Staphylococcus aureus  Organism: Enterococcus faecalis  Organism: Staphylococcus aureus  Organism: Escherichia coli

## 2024-04-22 NOTE — PROGRESS NOTE ADULT - ASSESSMENT
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 Smith presents with lower extremity pain. He was found to have a hematoma on the medial left thigh. The patient is hemodynamically stable with Hgb 12 g/dL and intact motor and sensory functions in the lower extremity. CTA demonstrating active subcutaneous and gracilis hematomas, large in size compared to CT performed four hours prior. S/p OR on 4/5 for LLE hematoma evacuation. s/p L thigh washout on 4/19    Plan:  - regular diet   - pain control PRN  - c/w abx given purulence draining from wound. Would recommend continuing IV zosyn while inpt. May transition to PO augmentin for a 1 week course upon discharge.   - recommend vac team consult for vac  - amy vascular surgery fellow on behalf of attending    Vascular Surgery   [phone]

## 2024-04-22 NOTE — PROGRESS NOTE ADULT - ASSESSMENT
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 Noticed within the last hour. No falls or trauma to the area. Administered Lovenox just prior to arrival. Denies fevers, chills, nausea, vomiting, urinary symptoms. No recent surgeries  found left thigh hematoma   s/p 2 units of PRBC   s/p hematoma evacuation 4/5, now doing well  s/p PRBC again   s/p OR 4/19 now with wound vac     Impression:   1) chronic strokes, resid minimal WFD  2) seizure d/o 2/2 strokes  3) ADHD  4) vertigo BPPV, stable   5) new L thigh hematoma   6) LLE compartment syndrome     - f/u vasc and vac team   - back on ASA 81mg daily and lovenox 40mg      - monitor H/H.      - monitor for compartment syndrome   - Lovenox full dose now held.  was injecting in thigh at site of hematoma ; last admission Lovenox started for DVT and changed to BID dosing   - for ADHD gets Vyvanse 50mg daily  - for seizure he is on vimpat 150 mg BID  - statin therapy with LDL goal < 70mg/dL; atorvastatin 20 at home   - meclizline PRN   - PT/OT   - check FS, glucose control <180  - GI/DVT ppx   - Thank you for allowing me to participate in the care of this patient. Call with questions.      Braxton Forde MD  Vascular Neurology  Office: [phone]

## 2024-04-22 NOTE — CONSULT NOTE ADULT - CONSULT REASON
infected hematoma
wound
Time Called: 900  Time Seen: 901  Time Re-Evaluated with Orthopedic Attendin
Rehabilitation consult
Hematoma
Thigh hematoma drainage
Eval of L thigh wound
office patient  stroke  louie
Left thigh hematoma

## 2024-04-22 NOTE — PROGRESS NOTE ADULT - SUBJECTIVE AND OBJECTIVE BOX
Subjective: Pt seen and examined at bedside with surgical team. Denies Left leg numbness, tingling, pain. Had some discomfort under toes with boot on last night.    Vital Signs Last 24 Hrs  T(C): 36.8 (22 Apr 2024 08:59), Max: 36.8 (22 Apr 2024 08:59)  T(F): 98.2 (22 Apr 2024 08:59), Max: 98.2 (22 Apr 2024 08:59)  HR: 70 (22 Apr 2024 08:59) (65 - 70)  BP: 107/65 (22 Apr 2024 08:59) (97/63 - 107/65)  BP(mean): --  RR: 18 (22 Apr 2024 08:59) (18 - 18)  SpO2: 97% (22 Apr 2024 08:59) (97% - 99%)    Parameters below as of 22 Apr 2024 08:59  Patient On (Oxygen Delivery Method): room air        I&O's Detail    21 Apr 2024 07:01  -  22 Apr 2024 07:00  --------------------------------------------------------  IN:    Oral Fluid: 200 mL  Total IN: 200 mL    OUT:    Voided (mL): 600 mL  Total OUT: 600 mL    Total NET: -400 mL      MEDICATIONS  (STANDING):  aspirin  chewable 81 milliGRAM(s) Oral daily  atorvastatin 20 milliGRAM(s) Oral at bedtime  chlorhexidine 2% Cloths 1 Application(s) Topical daily  enoxaparin Injectable 40 milliGRAM(s) SubCutaneous every 24 hours  gabapentin 300 milliGRAM(s) Oral every 8 hours  lacosamide 150 milliGRAM(s) Oral two times a day  multivitamin 1 Tablet(s) Oral daily  piperacillin/tazobactam IVPB.. 3.375 Gram(s) IV Intermittent every 8 hours  polyethylene glycol 3350 17 Gram(s) Oral daily  senna 2 Tablet(s) Oral at bedtime  tamsulosin 0.4 milliGRAM(s) Oral at bedtime    MEDICATIONS  (PRN):  bacitracin   Ointment 1 Application(s) Topical daily PRN daily dressing change  HYDROmorphone  Injectable 1 milliGRAM(s) IV Push every 6 hours PRN Moderate Pain (4 - 6)  meclizine 12.5 milliGRAM(s) Oral three times a day PRN Dizziness      LABS:                        10.7   6.78  )-----------( 491      ( 22 Apr 2024 09:07 )             34.2     04-22    142  |  104  |  12  ----------------------------<  102<H>  4.0   |  26  |  1.10    Ca    9.3      22 Apr 2024 09:07  Phos  3.3     04-22  Mg     2.3     04-22          Urinalysis Basic - ( 22 Apr 2024 09:07 )    Color: x / Appearance: x / SG: x / pH: x  Gluc: 102 mg/dL / Ketone: x  / Bili: x / Urobili: x   Blood: x / Protein: x / Nitrite: x   Leuk Esterase: x / RBC: x / WBC x   Sq Epi: x / Non Sq Epi: x / Bacteria: x    General: NAD  on room air  L groin vac holding suction, 5/5 strength LLE, LLE sensory intact, palp femoral, popliteal, and PT pulses

## 2024-04-23 LAB
-  AMPICILLIN: SIGNIFICANT CHANGE UP
-  VANCOMYCIN: SIGNIFICANT CHANGE UP
ANION GAP SERPL CALC-SCNC: 14 MMOL/L — SIGNIFICANT CHANGE UP (ref 5–17)
BASOPHILS # BLD AUTO: 0.08 K/UL — SIGNIFICANT CHANGE UP (ref 0–0.2)
BASOPHILS NFR BLD AUTO: 1.1 % — SIGNIFICANT CHANGE UP (ref 0–2)
BUN SERPL-MCNC: 11 MG/DL — SIGNIFICANT CHANGE UP (ref 7–23)
CALCIUM SERPL-MCNC: 8.5 MG/DL — SIGNIFICANT CHANGE UP (ref 8.4–10.5)
CHLORIDE SERPL-SCNC: 106 MMOL/L — SIGNIFICANT CHANGE UP (ref 96–108)
CO2 SERPL-SCNC: 22 MMOL/L — SIGNIFICANT CHANGE UP (ref 22–31)
CREAT SERPL-MCNC: 0.96 MG/DL — SIGNIFICANT CHANGE UP (ref 0.5–1.3)
EGFR: 95 ML/MIN/1.73M2 — SIGNIFICANT CHANGE UP
EOSINOPHIL # BLD AUTO: 0.47 K/UL — SIGNIFICANT CHANGE UP (ref 0–0.5)
EOSINOPHIL NFR BLD AUTO: 6.7 % — HIGH (ref 0–6)
GLUCOSE SERPL-MCNC: 82 MG/DL — SIGNIFICANT CHANGE UP (ref 70–99)
HCT VFR BLD CALC: 31.8 % — LOW (ref 39–50)
HGB BLD-MCNC: 9.7 G/DL — LOW (ref 13–17)
IMM GRANULOCYTES NFR BLD AUTO: 1.8 % — HIGH (ref 0–0.9)
LYMPHOCYTES # BLD AUTO: 1.41 K/UL — SIGNIFICANT CHANGE UP (ref 1–3.3)
LYMPHOCYTES # BLD AUTO: 20 % — SIGNIFICANT CHANGE UP (ref 13–44)
MAGNESIUM SERPL-MCNC: 2.2 MG/DL — SIGNIFICANT CHANGE UP (ref 1.6–2.6)
MCHC RBC-ENTMCNC: 27.3 PG — SIGNIFICANT CHANGE UP (ref 27–34)
MCHC RBC-ENTMCNC: 30.5 GM/DL — LOW (ref 32–36)
MCV RBC AUTO: 89.6 FL — SIGNIFICANT CHANGE UP (ref 80–100)
METHOD TYPE: SIGNIFICANT CHANGE UP
MONOCYTES # BLD AUTO: 0.65 K/UL — SIGNIFICANT CHANGE UP (ref 0–0.9)
MONOCYTES NFR BLD AUTO: 9.2 % — SIGNIFICANT CHANGE UP (ref 2–14)
NEUTROPHILS # BLD AUTO: 4.32 K/UL — SIGNIFICANT CHANGE UP (ref 1.8–7.4)
NEUTROPHILS NFR BLD AUTO: 61.2 % — SIGNIFICANT CHANGE UP (ref 43–77)
NRBC # BLD: 0 /100 WBCS — SIGNIFICANT CHANGE UP (ref 0–0)
PHOSPHATE SERPL-MCNC: 3.4 MG/DL — SIGNIFICANT CHANGE UP (ref 2.5–4.5)
PLATELET # BLD AUTO: 435 K/UL — HIGH (ref 150–400)
POTASSIUM SERPL-MCNC: 4.1 MMOL/L — SIGNIFICANT CHANGE UP (ref 3.5–5.3)
POTASSIUM SERPL-SCNC: 4.1 MMOL/L — SIGNIFICANT CHANGE UP (ref 3.5–5.3)
RBC # BLD: 3.55 M/UL — LOW (ref 4.2–5.8)
RBC # FLD: 13.8 % — SIGNIFICANT CHANGE UP (ref 10.3–14.5)
SODIUM SERPL-SCNC: 142 MMOL/L — SIGNIFICANT CHANGE UP (ref 135–145)
WBC # BLD: 7.06 K/UL — SIGNIFICANT CHANGE UP (ref 3.8–10.5)
WBC # FLD AUTO: 7.06 K/UL — SIGNIFICANT CHANGE UP (ref 3.8–10.5)

## 2024-04-23 PROCEDURE — 99232 SBSQ HOSP IP/OBS MODERATE 35: CPT | Mod: GC

## 2024-04-23 RX ORDER — AMPICILLIN SODIUM AND SULBACTAM SODIUM 250; 125 MG/ML; MG/ML
INJECTION, POWDER, FOR SUSPENSION INTRAMUSCULAR; INTRAVENOUS
Refills: 0 | Status: DISCONTINUED | OUTPATIENT
Start: 2024-04-23 | End: 2024-04-26

## 2024-04-23 RX ORDER — AMPICILLIN SODIUM AND SULBACTAM SODIUM 250; 125 MG/ML; MG/ML
3 INJECTION, POWDER, FOR SUSPENSION INTRAMUSCULAR; INTRAVENOUS EVERY 6 HOURS
Refills: 0 | Status: DISCONTINUED | OUTPATIENT
Start: 2024-04-23 | End: 2024-04-26

## 2024-04-23 RX ORDER — AMPICILLIN SODIUM AND SULBACTAM SODIUM 250; 125 MG/ML; MG/ML
3 INJECTION, POWDER, FOR SUSPENSION INTRAMUSCULAR; INTRAVENOUS ONCE
Refills: 0 | Status: COMPLETED | OUTPATIENT
Start: 2024-04-23 | End: 2024-04-23

## 2024-04-23 RX ADMIN — HYDROMORPHONE HYDROCHLORIDE 1 MILLIGRAM(S): 2 INJECTION INTRAMUSCULAR; INTRAVENOUS; SUBCUTANEOUS at 22:39

## 2024-04-23 RX ADMIN — HYDROMORPHONE HYDROCHLORIDE 1 MILLIGRAM(S): 2 INJECTION INTRAMUSCULAR; INTRAVENOUS; SUBCUTANEOUS at 22:09

## 2024-04-23 RX ADMIN — LACOSAMIDE 150 MILLIGRAM(S): 50 TABLET ORAL at 17:25

## 2024-04-23 RX ADMIN — TAMSULOSIN HYDROCHLORIDE 0.4 MILLIGRAM(S): 0.4 CAPSULE ORAL at 21:35

## 2024-04-23 RX ADMIN — AMPICILLIN SODIUM AND SULBACTAM SODIUM 200 GRAM(S): 250; 125 INJECTION, POWDER, FOR SUSPENSION INTRAMUSCULAR; INTRAVENOUS at 18:04

## 2024-04-23 RX ADMIN — GABAPENTIN 300 MILLIGRAM(S): 400 CAPSULE ORAL at 05:59

## 2024-04-23 RX ADMIN — ENOXAPARIN SODIUM 40 MILLIGRAM(S): 100 INJECTION SUBCUTANEOUS at 11:44

## 2024-04-23 RX ADMIN — HYDROMORPHONE HYDROCHLORIDE 1 MILLIGRAM(S): 2 INJECTION INTRAMUSCULAR; INTRAVENOUS; SUBCUTANEOUS at 02:14

## 2024-04-23 RX ADMIN — POLYETHYLENE GLYCOL 3350 17 GRAM(S): 17 POWDER, FOR SOLUTION ORAL at 11:44

## 2024-04-23 RX ADMIN — ATORVASTATIN CALCIUM 20 MILLIGRAM(S): 80 TABLET, FILM COATED ORAL at 21:35

## 2024-04-23 RX ADMIN — AMPICILLIN SODIUM AND SULBACTAM SODIUM 200 GRAM(S): 250; 125 INJECTION, POWDER, FOR SUSPENSION INTRAMUSCULAR; INTRAVENOUS at 09:21

## 2024-04-23 RX ADMIN — GABAPENTIN 300 MILLIGRAM(S): 400 CAPSULE ORAL at 13:42

## 2024-04-23 RX ADMIN — AMPICILLIN SODIUM AND SULBACTAM SODIUM 200 GRAM(S): 250; 125 INJECTION, POWDER, FOR SUSPENSION INTRAMUSCULAR; INTRAVENOUS at 23:35

## 2024-04-23 RX ADMIN — SENNA PLUS 2 TABLET(S): 8.6 TABLET ORAL at 21:36

## 2024-04-23 RX ADMIN — GABAPENTIN 300 MILLIGRAM(S): 400 CAPSULE ORAL at 21:36

## 2024-04-23 RX ADMIN — PIPERACILLIN AND TAZOBACTAM 25 GRAM(S): 4; .5 INJECTION, POWDER, LYOPHILIZED, FOR SOLUTION INTRAVENOUS at 05:58

## 2024-04-23 RX ADMIN — CHLORHEXIDINE GLUCONATE 1 APPLICATION(S): 213 SOLUTION TOPICAL at 11:46

## 2024-04-23 RX ADMIN — LACOSAMIDE 150 MILLIGRAM(S): 50 TABLET ORAL at 05:59

## 2024-04-23 RX ADMIN — Medication 1 APPLICATION(S): at 12:00

## 2024-04-23 RX ADMIN — Medication 81 MILLIGRAM(S): at 11:45

## 2024-04-23 RX ADMIN — Medication 1 TABLET(S): at 11:45

## 2024-04-23 RX ADMIN — HYDROMORPHONE HYDROCHLORIDE 1 MILLIGRAM(S): 2 INJECTION INTRAMUSCULAR; INTRAVENOUS; SUBCUTANEOUS at 01:44

## 2024-04-23 NOTE — PROGRESS NOTE ADULT - SUBJECTIVE AND OBJECTIVE BOX
Follow Up:  hematoma and abscess    Interval History/ROS: pt afebrile, no cough or vomiting, still has thigh pain        Allergies  No Known Allergies        ANTIMICROBIALS:  ampicillin/sulbactam  IVPB    ampicillin/sulbactam  IVPB 3 every 6 hours      OTHER MEDS:  aspirin  chewable 81 milliGRAM(s) Oral daily  atorvastatin 20 milliGRAM(s) Oral at bedtime  bacitracin   Ointment 1 Application(s) Topical daily PRN  chlorhexidine 2% Cloths 1 Application(s) Topical daily  Dakins Solution - 1/4 Strength 1 Application(s) Topical daily  enoxaparin Injectable 40 milliGRAM(s) SubCutaneous every 24 hours  gabapentin 300 milliGRAM(s) Oral every 8 hours  HYDROmorphone  Injectable 1 milliGRAM(s) IV Push every 6 hours PRN  lacosamide 150 milliGRAM(s) Oral two times a day  meclizine 12.5 milliGRAM(s) Oral three times a day PRN  multivitamin 1 Tablet(s) Oral daily  polyethylene glycol 3350 17 Gram(s) Oral daily  senna 2 Tablet(s) Oral at bedtime  tamsulosin 0.4 milliGRAM(s) Oral at bedtime      Vital Signs Last 24 Hrs  T(C): 36.5 (23 Apr 2024 09:00), Max: 37 (22 Apr 2024 16:03)  T(F): 97.7 (23 Apr 2024 09:00), Max: 98.6 (22 Apr 2024 16:03)  HR: 71 (23 Apr 2024 09:00) (60 - 71)  BP: 108/67 (23 Apr 2024 09:00) (99/64 - 108/67)  BP(mean): --  RR: 18 (23 Apr 2024 09:00) (18 - 18)  SpO2: 97% (23 Apr 2024 09:00) (96% - 98%)    Parameters below as of 23 Apr 2024 09:00  Patient On (Oxygen Delivery Method): room air        Physical Exam:  General:    NAD, non toxic  Respiratory:   comfortable on RA  abd:   soft, BS +, not tender  :     no CVAT, no suprapubic tenderness, no fish  Musculoskeletal : L thigh wound clean, some surrounding edema  Skin:    no rash  vascular: no phlebitis                        9.7    7.06  )-----------( 435      ( 23 Apr 2024 07:28 )             31.8       04-23    142  |  106  |  11  ----------------------------<  82  4.1   |  22  |  0.96    Ca    8.5      23 Apr 2024 07:28  Phos  3.4     04-23  Mg     2.2     04-23        Urinalysis Basic - ( 23 Apr 2024 07:28 )    Color: x / Appearance: x / SG: x / pH: x  Gluc: 82 mg/dL / Ketone: x  / Bili: x / Urobili: x   Blood: x / Protein: x / Nitrite: x   Leuk Esterase: x / RBC: x / WBC x   Sq Epi: x / Non Sq Epi: x / Bacteria: x        MICROBIOLOGY:  v  .Surgical Swab Left Thigh Hematoma  04-19-24   Moderate Escherichia coli  Few Enterococcus faecalis  Rare Staphylococcus aureus  Rare Enterococcus avium  --  Escherichia coli  Enterococcus faecalis  Staphylococcus aureus  Enterococcus avium                RADIOLOGY:  Images independently visualized and reviewed personally, findings as below  < from: CT Angio Lower Extremity w/ IV Cont, Left (04.13.24 @ 21:59) >  IMPRESSION:  Evolving hematoma in the medial left thigh is substantially decreased in   size compared with CT 4/5/2024 and without evidence for active hemorrhage.    < end of copied text >

## 2024-04-23 NOTE — PROVIDER CONTACT NOTE (OTHER) - REASON
pt complaining of thigh  pain but would like something else other than his PRN dilaudid  as the pain is not so that severe at the moment

## 2024-04-23 NOTE — PROGRESS NOTE ADULT - ASSESSMENT
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 On admission, vitals significant for hypotension and labs showing anemia with CTA showing large hematoma of the LLE and small RLE hematoma with repeat CTA showing interval increase of the hematoma and new small LLE hematoma s/p 2units pRBCs. He is now admitted to the MICU for further management.       # LE Hematoma, Anemia   - 4/4 CT A w/ Mod- large SubC hematoma in the anteromedial L upper thigh containing scattered foci of high attenuation concerning for active hemorrhage within the hematoma; Small collection in the anterior upper right thigh suspicious for a hematoma. Mild subcutaneous inflammatory change in the visualized anterior and lateral upper right thigh with associated foci of subcutaneous gas.   - 4/5 CT w/ Interval increase in size of an anteromedial L upper thigh subc  hematoma with scattered foci of high attenuation within the hematoma compatible with active hemorrhage; New left gracilis intramuscular hematoma which could be extending from subcutaneous hematoma.  - Duplex negative for DVT   - S/P Hematoma evacuation with Vascular on 4/5. Drains DC'd as per Vascular   - Pt with episode of worsening LLE edema 4/13, drop in Hgb. ASA and Lovenox held. S/P 2 units of PRBCs   - R/O Compartment syndrome. CT LE w/ evolving hematoma of medial L thigh, decreased in side, negative for active hemorrhage   - Pulse checks per protocol;  Monitor LE closely  - IR, Vascular, MICU, Wound care appreciated   - Plastic surgery eval appreciated; F/u recs --> wound is not yet amenable to reconstitution. Outpatient PSx follow up upon DC   - Seen by IR 4/16, no drainage offered, recommended for conservative management with wound care for L groin hematoma   - S/P OR with Vascular for wash out on 4/19. wound vac removed, drains removed   - Lovenox PPX dose resumed. Monitor H/H     Skin infection   - Surgical swab w. + E. coli, E. faecalis Staph aureus, rare Enterococcus avium   - on Zosyn for ABX, S  - ID eval consulted; F/u recs     #Leukocytosis  - Noted to have elevated WBC   - Trend WBC and fever curve, If febrile, check pan cultures --> Down-trending     #Seizure disorder  # CVA with residual expressive aphasia  - C/w home AEDs (lacosamide and vyanese)  - C/w home gabapentin   - Seizure precautions  - Neuro eval appreciated; F/u recs     #HLD  - C/w home Lipitor    #BPH  - C/w flomax    #DVTs  - hx of bilateral DVTs --> Repeat duplex negative       PPX  - Lovenox

## 2024-04-23 NOTE — PROGRESS NOTE ADULT - SUBJECTIVE AND OBJECTIVE BOX
Name of Patient : TAMI DUNHAM  MRN: [id number]  Date of visit: 04-23-24      Subjective: Patient seen and examined. No new events except as noted.   drains and wound vac removed     REVIEW OF SYSTEMS:    CONSTITUTIONAL: No weakness, fevers or chills  EYES/ENT: No visual changes;  No vertigo or throat pain   NECK: No pain or stiffness  RESPIRATORY: No cough, wheezing, hemoptysis; No shortness of breath  CARDIOVASCULAR: No chest pain or palpitations  GASTROINTESTINAL: No abdominal or epigastric pain. No nausea, vomiting, or hematemesis; No diarrhea or constipation. No melena or hematochezia.  GENITOURINARY: No dysuria, frequency or hematuria  NEUROLOGICAL: No numbness or weakness  SKIN: No itching, burning, rashes, or lesions   All other review of systems is negative unless indicated above.    MEDICATIONS:  MEDICATIONS  (STANDING):  ampicillin/sulbactam  IVPB      ampicillin/sulbactam  IVPB 3 Gram(s) IV Intermittent every 6 hours  aspirin  chewable 81 milliGRAM(s) Oral daily  atorvastatin 20 milliGRAM(s) Oral at bedtime  chlorhexidine 2% Cloths 1 Application(s) Topical daily  Dakins Solution - 1/4 Strength 1 Application(s) Topical daily  enoxaparin Injectable 40 milliGRAM(s) SubCutaneous every 24 hours  gabapentin 300 milliGRAM(s) Oral every 8 hours  lacosamide 150 milliGRAM(s) Oral two times a day  multivitamin 1 Tablet(s) Oral daily  polyethylene glycol 3350 17 Gram(s) Oral daily  senna 2 Tablet(s) Oral at bedtime  tamsulosin 0.4 milliGRAM(s) Oral at bedtime      PHYSICAL EXAM:  T(C): 36.7 (04-23-24 @ 15:50), Max: 36.7 (04-23-24 @ 15:50)  HR: 67 (04-23-24 @ 15:50) (60 - 71)  BP: 107/69 (04-23-24 @ 15:50) (99/64 - 108/67)  RR: 18 (04-23-24 @ 15:50) (18 - 18)  SpO2: 97% (04-23-24 @ 15:50) (96% - 97%)  Wt(kg): --  I&O's Summary    22 Apr 2024 07:01 - 23 Apr 2024 07:00  --------------------------------------------------------  IN: 480 mL / OUT: 1700 mL / NET: -1220 mL    23 Apr 2024 07:01  -  23 Apr 2024 23:43  --------------------------------------------------------  IN: 850 mL / OUT: 1300 mL / NET: -450 mL          Appearance: Normal	  HEENT:  PERRLA   Lymphatic: No lymphadenopathy   Cardiovascular: Normal S1 S2, no JVD  Respiratory: normal effort , clear  Gastrointestinal:  Soft, Non-tender  Skin: No rashes,  warm to touch  Psychiatry:  Mood & affect appropriate  Musculuskeletal: No edema    recent labs, Imaging and EKGs personally reviewed     04-22-24 @ 07:01  -  04-23-24 @ 07:00  --------------------------------------------------------  IN: 480 mL / OUT: 1700 mL / NET: -1220 mL    04-23-24 @ 07:01  -  04-23-24 @ 23:43  --------------------------------------------------------  IN: 850 mL / OUT: 1300 mL / NET: -450 mL                          9.7    7.06  )-----------( 435      ( 23 Apr 2024 07:28 )             31.8               04-23    142  |  106  |  11  ----------------------------<  82  4.1   |  22  |  0.96    Ca    8.5      23 Apr 2024 07:28  Phos  3.4     04-23  Mg     2.2     04-23                         Urinalysis Basic - ( 23 Apr 2024 07:28 )    Color: x / Appearance: x / SG: x / pH: x  Gluc: 82 mg/dL / Ketone: x  / Bili: x / Urobili: x   Blood: x / Protein: x / Nitrite: x   Leuk Esterase: x / RBC: x / WBC x   Sq Epi: x / Non Sq Epi: x / Bacteria: x

## 2024-04-23 NOTE — CHART NOTE - NSCHARTNOTEFT_GEN_A_CORE
Wound Care Team Note:    Request for reevaluation of wound VAC therapy for thigh wound received. Wound VAC being managed by wound PT with Vascular surgery. Will defer to Vascular's decision to d/c wound vac at present. Discussed with primary team.    Jaky Flores, NP-C, CWOCN via TEAMS

## 2024-04-23 NOTE — PROGRESS NOTE ADULT - ASSESSMENT
53 m with HLD, CVA with residual expressive aphasia, bilateral DVTs on chronic Lovenox SQ (150Qd injecting in thighs) compartment syndrome s/p L fasciotomy, presented 4/4 with sudden L thigh edema, afebrile, WBC: 12=> 16 with drop in Hgb, CT showed large L thigh hematoma with active hemorrhage and small R thigh hematoma with?gas  s/p OR evacuation of hematoma 4/5, 1L clot removed  again had drop in Hgb 4/13  on 4/18 pt found to have purulent drainage from the wound, s/p washout 4/19, cx with E-coli, staph aureus E-faecalis, E-avium  pt afebrile, no WBC      hematoma of R thigh s/p evacuation 4/5 and then infected s/p washout 4/19 cx with E-coli (pan Sensitive), MSSA E-faecalis, E-avium    * switch to unasyn  * washout was 4/19 so day 5 and pt afebrile, WBC normal, will complete a 7 day course 4/25  * continue wound care  * will sign off, please call with questions    The above assessment and plan was discussed with the primary team    Lilian Harmon MD  contact on teams  After 5pm and on weekends call [phone] 53 m with HLD, CVA with residual expressive aphasia, bilateral DVTs on chronic Lovenox SQ (150Qd injecting in thighs) compartment syndrome s/p L fasciotomy, presented 4/4 with sudden L thigh edema, afebrile, WBC: 12=> 16 with drop in Hgb, CT showed large L thigh hematoma with active hemorrhage and small R thigh hematoma with?gas  s/p OR evacuation of hematoma 4/5, 1L clot removed  again had drop in Hgb 4/13  on 4/18 pt found to have purulent drainage from the wound, s/p washout 4/19, cx with E-coli, staph aureus E-faecalis, E-avium  pt afebrile, no WBC      hematoma of R thigh s/p evacuation 4/5 and then infected s/p washout 4/19 cx with E-coli (pan Sensitive), MSSA E-faecalis, E-avium    * switch to unasyn  * washout was 4/19 so day 5 and pt afebrile, WBC normal, will complete a 7 day course 4/25, if ready for discharge, switch to PO augmentin 875 bid to complete the course  * continue wound care  * will sign off, please call with questions    The above assessment and plan was discussed with the primary team    Lilian Harmon MD  contact on teams  After 5pm and on weekends call [phone]

## 2024-04-24 LAB
ALBUMIN SERPL ELPH-MCNC: 3.5 G/DL — SIGNIFICANT CHANGE UP (ref 3.3–5)
ALP SERPL-CCNC: 80 U/L — SIGNIFICANT CHANGE UP (ref 40–120)
ALT FLD-CCNC: 16 U/L — SIGNIFICANT CHANGE UP (ref 10–45)
ANION GAP SERPL CALC-SCNC: 11 MMOL/L — SIGNIFICANT CHANGE UP (ref 5–17)
AST SERPL-CCNC: 9 U/L — LOW (ref 10–40)
BASOPHILS # BLD AUTO: 0.07 K/UL — SIGNIFICANT CHANGE UP (ref 0–0.2)
BASOPHILS NFR BLD AUTO: 1.4 % — SIGNIFICANT CHANGE UP (ref 0–2)
BILIRUB SERPL-MCNC: 0.2 MG/DL — SIGNIFICANT CHANGE UP (ref 0.2–1.2)
BUN SERPL-MCNC: 12 MG/DL — SIGNIFICANT CHANGE UP (ref 7–23)
CALCIUM SERPL-MCNC: 8.6 MG/DL — SIGNIFICANT CHANGE UP (ref 8.4–10.5)
CHLORIDE SERPL-SCNC: 106 MMOL/L — SIGNIFICANT CHANGE UP (ref 96–108)
CO2 SERPL-SCNC: 23 MMOL/L — SIGNIFICANT CHANGE UP (ref 22–31)
CREAT SERPL-MCNC: 0.94 MG/DL — SIGNIFICANT CHANGE UP (ref 0.5–1.3)
EGFR: 97 ML/MIN/1.73M2 — SIGNIFICANT CHANGE UP
EOSINOPHIL # BLD AUTO: 0.46 K/UL — SIGNIFICANT CHANGE UP (ref 0–0.5)
EOSINOPHIL NFR BLD AUTO: 9.2 % — HIGH (ref 0–6)
GLUCOSE SERPL-MCNC: 90 MG/DL — SIGNIFICANT CHANGE UP (ref 70–99)
HCT VFR BLD CALC: 31.8 % — LOW (ref 39–50)
HGB BLD-MCNC: 10.1 G/DL — LOW (ref 13–17)
IMM GRANULOCYTES NFR BLD AUTO: 1.8 % — HIGH (ref 0–0.9)
LYMPHOCYTES # BLD AUTO: 1.36 K/UL — SIGNIFICANT CHANGE UP (ref 1–3.3)
LYMPHOCYTES # BLD AUTO: 27.1 % — SIGNIFICANT CHANGE UP (ref 13–44)
MCHC RBC-ENTMCNC: 27.9 PG — SIGNIFICANT CHANGE UP (ref 27–34)
MCHC RBC-ENTMCNC: 31.8 GM/DL — LOW (ref 32–36)
MCV RBC AUTO: 87.8 FL — SIGNIFICANT CHANGE UP (ref 80–100)
MONOCYTES # BLD AUTO: 0.49 K/UL — SIGNIFICANT CHANGE UP (ref 0–0.9)
MONOCYTES NFR BLD AUTO: 9.8 % — SIGNIFICANT CHANGE UP (ref 2–14)
NEUTROPHILS # BLD AUTO: 2.54 K/UL — SIGNIFICANT CHANGE UP (ref 1.8–7.4)
NEUTROPHILS NFR BLD AUTO: 50.7 % — SIGNIFICANT CHANGE UP (ref 43–77)
NRBC # BLD: 0 /100 WBCS — SIGNIFICANT CHANGE UP (ref 0–0)
PLATELET # BLD AUTO: 414 K/UL — HIGH (ref 150–400)
POTASSIUM SERPL-MCNC: 3.8 MMOL/L — SIGNIFICANT CHANGE UP (ref 3.5–5.3)
POTASSIUM SERPL-SCNC: 3.8 MMOL/L — SIGNIFICANT CHANGE UP (ref 3.5–5.3)
PROT SERPL-MCNC: 6.2 G/DL — SIGNIFICANT CHANGE UP (ref 6–8.3)
RBC # BLD: 3.62 M/UL — LOW (ref 4.2–5.8)
RBC # FLD: 13.6 % — SIGNIFICANT CHANGE UP (ref 10.3–14.5)
SODIUM SERPL-SCNC: 140 MMOL/L — SIGNIFICANT CHANGE UP (ref 135–145)
WBC # BLD: 5.01 K/UL — SIGNIFICANT CHANGE UP (ref 3.8–10.5)
WBC # FLD AUTO: 5.01 K/UL — SIGNIFICANT CHANGE UP (ref 3.8–10.5)

## 2024-04-24 RX ORDER — ACETAMINOPHEN 500 MG
1000 TABLET ORAL ONCE
Refills: 0 | Status: COMPLETED | OUTPATIENT
Start: 2024-04-24 | End: 2024-04-24

## 2024-04-24 RX ADMIN — AMPICILLIN SODIUM AND SULBACTAM SODIUM 200 GRAM(S): 250; 125 INJECTION, POWDER, FOR SUSPENSION INTRAMUSCULAR; INTRAVENOUS at 11:40

## 2024-04-24 RX ADMIN — LACOSAMIDE 150 MILLIGRAM(S): 50 TABLET ORAL at 05:26

## 2024-04-24 RX ADMIN — Medication 1 APPLICATION(S): at 07:00

## 2024-04-24 RX ADMIN — Medication 400 MILLIGRAM(S): at 23:17

## 2024-04-24 RX ADMIN — LACOSAMIDE 150 MILLIGRAM(S): 50 TABLET ORAL at 17:35

## 2024-04-24 RX ADMIN — AMPICILLIN SODIUM AND SULBACTAM SODIUM 200 GRAM(S): 250; 125 INJECTION, POWDER, FOR SUSPENSION INTRAMUSCULAR; INTRAVENOUS at 05:25

## 2024-04-24 RX ADMIN — ATORVASTATIN CALCIUM 20 MILLIGRAM(S): 80 TABLET, FILM COATED ORAL at 22:27

## 2024-04-24 RX ADMIN — GABAPENTIN 300 MILLIGRAM(S): 400 CAPSULE ORAL at 13:43

## 2024-04-24 RX ADMIN — CHLORHEXIDINE GLUCONATE 1 APPLICATION(S): 213 SOLUTION TOPICAL at 11:45

## 2024-04-24 RX ADMIN — AMPICILLIN SODIUM AND SULBACTAM SODIUM 200 GRAM(S): 250; 125 INJECTION, POWDER, FOR SUSPENSION INTRAMUSCULAR; INTRAVENOUS at 23:17

## 2024-04-24 RX ADMIN — SENNA PLUS 2 TABLET(S): 8.6 TABLET ORAL at 22:27

## 2024-04-24 RX ADMIN — Medication 81 MILLIGRAM(S): at 11:40

## 2024-04-24 RX ADMIN — POLYETHYLENE GLYCOL 3350 17 GRAM(S): 17 POWDER, FOR SOLUTION ORAL at 11:40

## 2024-04-24 RX ADMIN — AMPICILLIN SODIUM AND SULBACTAM SODIUM 200 GRAM(S): 250; 125 INJECTION, POWDER, FOR SUSPENSION INTRAMUSCULAR; INTRAVENOUS at 17:35

## 2024-04-24 RX ADMIN — ENOXAPARIN SODIUM 40 MILLIGRAM(S): 100 INJECTION SUBCUTANEOUS at 11:40

## 2024-04-24 RX ADMIN — GABAPENTIN 300 MILLIGRAM(S): 400 CAPSULE ORAL at 05:26

## 2024-04-24 RX ADMIN — Medication 1 TABLET(S): at 11:45

## 2024-04-24 RX ADMIN — GABAPENTIN 300 MILLIGRAM(S): 400 CAPSULE ORAL at 22:27

## 2024-04-24 RX ADMIN — TAMSULOSIN HYDROCHLORIDE 0.4 MILLIGRAM(S): 0.4 CAPSULE ORAL at 22:27

## 2024-04-24 NOTE — PROGRESS NOTE ADULT - ASSESSMENT
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 Smith presents with lower extremity pain. He was found to have a hematoma on the medial left thigh. The patient is hemodynamically stable with Hgb 12 g/dL and intact motor and sensory functions in the lower extremity. CTA demonstrating active subcutaneous and gracilis hematomas, large in size compared to CT performed four hours prior. S/p OR on 4/5 for LLE hematoma evacuation. s/p L thigh washout on 4/19    Plan:  - daily wet to dry dressing changes  - regular diet   - pain control PRN  - c/w abx , rec continuing IV zosyn while inpt. May transition to PO augmentin for a 1 week course upon discharge.   - recommend vac team consult for vac placement on Friday 4/26  - plan for patient to be discharged with wound vac    Vascular Surgery   [phone]

## 2024-04-24 NOTE — PROGRESS NOTE ADULT - SUBJECTIVE AND OBJECTIVE BOX
GENERAL SURGERY DAILY PROGRESS NOTE:     Subjective: Patient resting comfortably. No acute events overnight. Pain well controlled.      Objective:    MEDICATIONS  (STANDING):  ampicillin/sulbactam  IVPB      ampicillin/sulbactam  IVPB 3 Gram(s) IV Intermittent every 6 hours  aspirin  chewable 81 milliGRAM(s) Oral daily  atorvastatin 20 milliGRAM(s) Oral at bedtime  chlorhexidine 2% Cloths 1 Application(s) Topical daily  Dakins Solution - 1/4 Strength 1 Application(s) Topical daily  enoxaparin Injectable 40 milliGRAM(s) SubCutaneous every 24 hours  gabapentin 300 milliGRAM(s) Oral every 8 hours  lacosamide 150 milliGRAM(s) Oral two times a day  multivitamin 1 Tablet(s) Oral daily  polyethylene glycol 3350 17 Gram(s) Oral daily  senna 2 Tablet(s) Oral at bedtime  tamsulosin 0.4 milliGRAM(s) Oral at bedtime    MEDICATIONS  (PRN):  bacitracin   Ointment 1 Application(s) Topical daily PRN daily dressing change  HYDROmorphone  Injectable 1 milliGRAM(s) IV Push every 6 hours PRN Moderate Pain (4 - 6)  meclizine 12.5 milliGRAM(s) Oral three times a day PRN Dizziness      Vital Signs Last 24 Hrs  T(C): 36.6 (23 Apr 2024 23:55), Max: 36.7 (23 Apr 2024 15:50)  T(F): 97.8 (23 Apr 2024 23:55), Max: 98.1 (23 Apr 2024 15:50)  HR: 66 (23 Apr 2024 23:55) (66 - 71)  BP: 96/60 (23 Apr 2024 23:55) (96/60 - 108/67)  BP(mean): --  RR: 18 (23 Apr 2024 23:55) (18 - 18)  SpO2: 97% (23 Apr 2024 23:55) (97% - 97%)    Parameters below as of 23 Apr 2024 23:55  Patient On (Oxygen Delivery Method): room air    Physical Exam:  General: NAD  Pulm: breathing comfortably on room air  Ext: L groin wound with no bleeding or drainage, wet to dry dressing replaced this am; 5/5 strength LLE, LLE sensory intact, palp femoral, popliteal, and PT pulses         I&O's Detail    23 Apr 2024 07:01  -  24 Apr 2024 07:00  --------------------------------------------------------  IN:    IV PiggyBack: 200 mL    Oral Fluid: 650 mL  Total IN: 850 mL    OUT:    Voided (mL): 2100 mL  Total OUT: 2100 mL    Total NET: -1250 mL          Daily     Daily     LABS:                        10.1   5.01  )-----------( 414      ( 24 Apr 2024 06:47 )             31.8     04-24    140  |  106  |  12  ----------------------------<  90  3.8   |  23  |  0.94    Ca    8.6      24 Apr 2024 06:47  Phos  3.4     04-23  Mg     2.2     04-23    TPro  6.2  /  Alb  3.5  /  TBili  0.2  /  DBili  x   /  AST  9<L>  /  ALT  16  /  AlkPhos  80  04-24      Urinalysis Basic - ( 24 Apr 2024 06:47 )    Color: x / Appearance: x / SG: x / pH: x  Gluc: 90 mg/dL / Ketone: x  / Bili: x / Urobili: x   Blood: x / Protein: x / Nitrite: x   Leuk Esterase: x / RBC: x / WBC x   Sq Epi: x / Non Sq Epi: x / Bacteria: x        RADIOLOGY & ADDITIONAL STUDIES:

## 2024-04-24 NOTE — PROGRESS NOTE ADULT - ASSESSMENT
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 On admission, vitals significant for hypotension and labs showing anemia with CTA showing large hematoma of the LLE and small RLE hematoma with repeat CTA showing interval increase of the hematoma and new small LLE hematoma s/p 2units pRBCs. He is now admitted to the MICU for further management.       # LE Hematoma, Anemia   - 4/4 CT A w/ Mod- large SubC hematoma in the anteromedial L upper thigh containing scattered foci of high attenuation concerning for active hemorrhage within the hematoma; Small collection in the anterior upper right thigh suspicious for a hematoma. Mild subcutaneous inflammatory change in the visualized anterior and lateral upper right thigh with associated foci of subcutaneous gas.   - 4/5 CT w/ Interval increase in size of an anteromedial L upper thigh subc  hematoma with scattered foci of high attenuation within the hematoma compatible with active hemorrhage; New left gracilis intramuscular hematoma which could be extending from subcutaneous hematoma.  - Duplex negative for DVT   - S/P Hematoma evacuation with Vascular on 4/5. Drains DC'd as per Vascular   - Pt with episode of worsening LLE edema 4/13, drop in Hgb. ASA and Lovenox held. S/P 2 units of PRBCs   - R/O Compartment syndrome. CT LE w/ evolving hematoma of medial L thigh, decreased in side, negative for active hemorrhage   - Pulse checks per protocol;  Monitor LE closely  - IR, Vascular, MICU, Wound care appreciated   - Plastic surgery eval appreciated; F/u recs --> wound is not yet amenable to reconstitution. Outpatient PSx follow up upon DC   - Seen by IR 4/16, no drainage offered, recommended for conservative management with wound care for L groin hematoma   - S/P OR with Vascular for wash out on 4/19. wound vac removed, drains removed   - Lovenox PPX dose resumed. Monitor H/H   - wound vac placement on friday     Skin infection   - Surgical swab w. + E. coli, E. faecalis Staph aureus, rare Enterococcus avium   - on Zosyn for ABX, S  - ID eval consulted; F/u recs     #Leukocytosis  - Noted to have elevated WBC   - Trend WBC and fever curve, If febrile, check pan cultures --> Down-trending     #Seizure disorder  # CVA with residual expressive aphasia  - C/w home AEDs (lacosamide and vyanese)  - C/w home gabapentin   - Seizure precautions  - Neuro eval appreciated; F/u recs     #HLD  - C/w home Lipitor    #BPH  - C/w flomax    #DVTs  - hx of bilateral DVTs --> Repeat duplex negative       PPX  - Lovenox

## 2024-04-24 NOTE — PROGRESS NOTE ADULT - ASSESSMENT
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 Noticed within the last hour. No falls or trauma to the area. Administered Lovenox just prior to arrival. Denies fevers, chills, nausea, vomiting, urinary symptoms. No recent surgeries  found left thigh hematoma   s/p 2 units of PRBC   s/p hematoma evacuation 4/5, now doing well  s/p PRBC again   s/p OR 4/19 now with wound vac     Impression:   1) chronic strokes, resid minimal WFD  2) seizure d/o 2/2 strokes  3) ADHD  4) vertigo BPPV, stable   5) new L thigh hematoma   6) LLE compartment syndrome     - on ampicillin/sulbactam   - f/u vasc and vac team ; wound vac until 4/26   - back on ASA 81mg daily and lovenox 40mg      - monitor H/H.      - monitor for compartment syndrome   - Lovenox full dose now held.  was injecting in thigh at site of hematoma ; last admission Lovenox started for DVT and changed to BID dosing   - for ADHD gets Vyvanse 50mg daily  - for seizure he is on vimpat 150 mg BID  - statin therapy with LDL goal < 70mg/dL; atorvastatin 20 at home   - meclizline PRN   - PT/OT   - check FS, glucose control <180  - GI/DVT ppx   - Thank you for allowing me to participate in the care of this patient. Call with questions.      Braxton Forde MD  Vascular Neurology  Office: [phone]

## 2024-04-24 NOTE — PROGRESS NOTE ADULT - ATTENDING COMMENTS
s/p hematoma evacuation  s/p washout  continue local wound care with dakin's  if wound continues to improve, wound vac placement at end of week  continue abx per id    I will be away and will return wed 5/1/24. Dr. Hanson will be covering.   Please reach out to the vascular surgery team with any questions or concerns.

## 2024-04-24 NOTE — PROGRESS NOTE ADULT - SUBJECTIVE AND OBJECTIVE BOX
Name of Patient : TAMI DUNHAM  MRN: [id number]  Date of visit: 04-24-24       Subjective: Patient seen and examined. No new events except as noted.   Doing okay     REVIEW OF SYSTEMS:    CONSTITUTIONAL: No weakness, fevers or chills  EYES/ENT: No visual changes;  No vertigo or throat pain   NECK: No pain or stiffness  RESPIRATORY: No cough, wheezing, hemoptysis; No shortness of breath  CARDIOVASCULAR: No chest pain or palpitations  GASTROINTESTINAL: No abdominal or epigastric pain. No nausea, vomiting, or hematemesis; No diarrhea or constipation. No melena or hematochezia.  GENITOURINARY: No dysuria, frequency or hematuria  NEUROLOGICAL: No numbness or weakness  SKIN: No itching, burning, rashes, or lesions   All other review of systems is negative unless indicated above.    MEDICATIONS:  MEDICATIONS  (STANDING):  ampicillin/sulbactam  IVPB 3 Gram(s) IV Intermittent every 6 hours  ampicillin/sulbactam  IVPB      aspirin  chewable 81 milliGRAM(s) Oral daily  atorvastatin 20 milliGRAM(s) Oral at bedtime  chlorhexidine 2% Cloths 1 Application(s) Topical daily  Dakins Solution - 1/4 Strength 1 Application(s) Topical daily  enoxaparin Injectable 40 milliGRAM(s) SubCutaneous every 24 hours  gabapentin 300 milliGRAM(s) Oral every 8 hours  lacosamide 150 milliGRAM(s) Oral two times a day  multivitamin 1 Tablet(s) Oral daily  polyethylene glycol 3350 17 Gram(s) Oral daily  senna 2 Tablet(s) Oral at bedtime  tamsulosin 0.4 milliGRAM(s) Oral at bedtime      PHYSICAL EXAM:  T(C): 36.3 (04-24-24 @ 16:20), Max: 37 (04-24-24 @ 09:15)  HR: 66 (04-24-24 @ 16:20) (66 - 68)  BP: 114/76 (04-24-24 @ 16:20) (96/60 - 114/76)  RR: 18 (04-24-24 @ 16:20) (18 - 18)  SpO2: 99% (04-24-24 @ 16:20) (96% - 99%)  Wt(kg): --  I&O's Summary    23 Apr 2024 07:01  -  24 Apr 2024 07:00  --------------------------------------------------------  IN: 850 mL / OUT: 2100 mL / NET: -1250 mL    24 Apr 2024 07:01  -  24 Apr 2024 20:51  --------------------------------------------------------  IN: 650 mL / OUT: 0 mL / NET: 650 mL        Weight (kg): 90.6 (04-24 @ 09:12)    Appearance: Normal	  HEENT:  PERRLA   Lymphatic: No lymphadenopathy   Cardiovascular: Normal S1 S2, no JVD  Respiratory: normal effort , clear  Gastrointestinal:  Soft, Non-tender  Skin: No rashes,  warm to touch  Psychiatry:  Mood & affect appropriate  Musculuskeletal: No edema    recent labs, Imaging and EKGs personally reviewed     04-23-24 @ 07:01  -  04-24-24 @ 07:00  --------------------------------------------------------  IN: 850 mL / OUT: 2100 mL / NET: -1250 mL    04-24-24 @ 07:01  -  04-24-24 @ 20:51  --------------------------------------------------------  IN: 650 mL / OUT: 0 mL / NET: 650 mL                            10.1   5.01  )-----------( 414      ( 24 Apr 2024 06:47 )             31.8               04-24    140  |  106  |  12  ----------------------------<  90  3.8   |  23  |  0.94    Ca    8.6      24 Apr 2024 06:47  Phos  3.4     04-23  Mg     2.2     04-23    TPro  6.2  /  Alb  3.5  /  TBili  0.2  /  DBili  x   /  AST  9<L>  /  ALT  16  /  AlkPhos  80  04-24                       Urinalysis Basic - ( 24 Apr 2024 06:47 )    Color: x / Appearance: x / SG: x / pH: x  Gluc: 90 mg/dL / Ketone: x  / Bili: x / Urobili: x   Blood: x / Protein: x / Nitrite: x   Leuk Esterase: x / RBC: x / WBC x   Sq Epi: x / Non Sq Epi: x / Bacteria: x

## 2024-04-24 NOTE — PROGRESS NOTE ADULT - SUBJECTIVE AND OBJECTIVE BOX
Neurology        S: patient seen.  doing okay ;  s/p OR with new wound vac         Medications: MEDICATIONS  (STANDING):  ampicillin/sulbactam  IVPB      ampicillin/sulbactam  IVPB 3 Gram(s) IV Intermittent every 6 hours  aspirin  chewable 81 milliGRAM(s) Oral daily  atorvastatin 20 milliGRAM(s) Oral at bedtime  chlorhexidine 2% Cloths 1 Application(s) Topical daily  Dakins Solution - 1/4 Strength 1 Application(s) Topical daily  enoxaparin Injectable 40 milliGRAM(s) SubCutaneous every 24 hours  gabapentin 300 milliGRAM(s) Oral every 8 hours  lacosamide 150 milliGRAM(s) Oral two times a day  multivitamin 1 Tablet(s) Oral daily  polyethylene glycol 3350 17 Gram(s) Oral daily  senna 2 Tablet(s) Oral at bedtime  tamsulosin 0.4 milliGRAM(s) Oral at bedtime    MEDICATIONS  (PRN):  bacitracin   Ointment 1 Application(s) Topical daily PRN daily dressing change  HYDROmorphone  Injectable 1 milliGRAM(s) IV Push every 6 hours PRN Moderate Pain (4 - 6)  meclizine 12.5 milliGRAM(s) Oral three times a day PRN Dizziness       Vitals:  Vital Signs Last 24 Hrs  T(C): 36.3 (24 Apr 2024 16:20), Max: 37 (24 Apr 2024 09:15)  T(F): 97.4 (24 Apr 2024 16:20), Max: 98.6 (24 Apr 2024 09:15)  HR: 66 (24 Apr 2024 16:20) (66 - 68)  BP: 114/76 (24 Apr 2024 16:20) (96/60 - 114/76)  BP(mean): --  RR: 18 (24 Apr 2024 16:20) (18 - 18)  SpO2: 99% (24 Apr 2024 16:20) (96% - 99%)    Parameters below as of 24 Apr 2024 16:20  Patient On (Oxygen Delivery Method): room air                 General Exam:   General Appearance: Appropriately dressed and in no acute distress       Head: Normocephalic, atraumatic and no dysmorphic features  Ear, Nose, and Throat: Moist mucous membranes  CVS: S1S2+  Resp: No SOB, no wheeze or rhonchi  GI: soft NT/ND  Extremities: No edema or cyanosis L leg thigh hematoma   Skin: No bruises or rashes     Neurological Exam:  Mental Status: Awake, alert and oriented x 3 minimal WFD .  Able to follow simple and complex verbal commands. Able to name and repeat. fluent speech. No obvious aphasia or dysarthria noted.   Cranial Nerves: PERRL, EOMI, VFFC, sensation V1-V3 intact,  no obvious facial asymmetry, equal elevation of palate, scm/trap 5/5, tongue is midline on protrusion. no obvious papilledema on fundoscopic exam. hearing is grossly intact.   Motor: Normal bulk, tone and strength throughout. Fine finger movements were intact and symmetric. no tremors or drift noted.    Sensation: Intact to light touch and pinprick throughout. no right/left confusion. no extinction to tactile on DSS.  .   Reflexes: 1+ throughout at biceps, brachioradialis, triceps, patellars and ankles bilaterally and equal. No clonus. R toe and L toe were both downgoing.  Coordination: No dysmetria on FNF    Gait: deferred     Data/Labs/Imaging which I personally reviewed.           LABS:                          10.1   5.01  )-----------( 414      ( 24 Apr 2024 06:47 )             31.8     04-24    140  |  106  |  12  ----------------------------<  90  3.8   |  23  |  0.94    Ca    8.6      24 Apr 2024 06:47  Phos  3.4     04-23  Mg     2.2     04-23    TPro  6.2  /  Alb  3.5  /  TBili  0.2  /  DBili  x   /  AST  9<L>  /  ALT  16  /  AlkPhos  80  04-24    LIVER FUNCTIONS - ( 24 Apr 2024 06:47 )  Alb: 3.5 g/dL / Pro: 6.2 g/dL / ALK PHOS: 80 U/L / ALT: 16 U/L / AST: 9 U/L / GGT: x             Urinalysis Basic - ( 24 Apr 2024 06:47 )    Color: x / Appearance: x / SG: x / pH: x  Gluc: 90 mg/dL / Ketone: x  / Bili: x / Urobili: x   Blood: x / Protein: x / Nitrite: x   Leuk Esterase: x / RBC: x / WBC x   Sq Epi: x / Non Sq Epi: x / Bacteria: x

## 2024-04-25 LAB
ANION GAP SERPL CALC-SCNC: 12 MMOL/L — SIGNIFICANT CHANGE UP (ref 5–17)
BUN SERPL-MCNC: 14 MG/DL — SIGNIFICANT CHANGE UP (ref 7–23)
CALCIUM SERPL-MCNC: 8.6 MG/DL — SIGNIFICANT CHANGE UP (ref 8.4–10.5)
CHLORIDE SERPL-SCNC: 107 MMOL/L — SIGNIFICANT CHANGE UP (ref 96–108)
CO2 SERPL-SCNC: 22 MMOL/L — SIGNIFICANT CHANGE UP (ref 22–31)
CREAT SERPL-MCNC: 0.86 MG/DL — SIGNIFICANT CHANGE UP (ref 0.5–1.3)
CULTURE RESULTS: ABNORMAL
EGFR: 104 ML/MIN/1.73M2 — SIGNIFICANT CHANGE UP
GLUCOSE SERPL-MCNC: 89 MG/DL — SIGNIFICANT CHANGE UP (ref 70–99)
HCT VFR BLD CALC: 31.9 % — LOW (ref 39–50)
HGB BLD-MCNC: 10.3 G/DL — LOW (ref 13–17)
MCHC RBC-ENTMCNC: 28.5 PG — SIGNIFICANT CHANGE UP (ref 27–34)
MCHC RBC-ENTMCNC: 32.3 GM/DL — SIGNIFICANT CHANGE UP (ref 32–36)
MCV RBC AUTO: 88.1 FL — SIGNIFICANT CHANGE UP (ref 80–100)
NRBC # BLD: 0 /100 WBCS — SIGNIFICANT CHANGE UP (ref 0–0)
ORGANISM # SPEC MICROSCOPIC CNT: ABNORMAL
PLATELET # BLD AUTO: 357 K/UL — SIGNIFICANT CHANGE UP (ref 150–400)
POTASSIUM SERPL-MCNC: 4 MMOL/L — SIGNIFICANT CHANGE UP (ref 3.5–5.3)
POTASSIUM SERPL-SCNC: 4 MMOL/L — SIGNIFICANT CHANGE UP (ref 3.5–5.3)
RBC # BLD: 3.62 M/UL — LOW (ref 4.2–5.8)
RBC # FLD: 13.8 % — SIGNIFICANT CHANGE UP (ref 10.3–14.5)
SODIUM SERPL-SCNC: 141 MMOL/L — SIGNIFICANT CHANGE UP (ref 135–145)
SPECIMEN SOURCE: SIGNIFICANT CHANGE UP
WBC # BLD: 4.92 K/UL — SIGNIFICANT CHANGE UP (ref 3.8–10.5)
WBC # FLD AUTO: 4.92 K/UL — SIGNIFICANT CHANGE UP (ref 3.8–10.5)

## 2024-04-25 RX ORDER — HYDROMORPHONE HYDROCHLORIDE 2 MG/ML
0.5 INJECTION INTRAMUSCULAR; INTRAVENOUS; SUBCUTANEOUS ONCE
Refills: 0 | Status: DISCONTINUED | OUTPATIENT
Start: 2024-04-25 | End: 2024-04-25

## 2024-04-25 RX ORDER — ACETAMINOPHEN 500 MG
650 TABLET ORAL ONCE
Refills: 0 | Status: COMPLETED | OUTPATIENT
Start: 2024-04-25 | End: 2024-04-27

## 2024-04-25 RX ADMIN — HYDROMORPHONE HYDROCHLORIDE 1 MILLIGRAM(S): 2 INJECTION INTRAMUSCULAR; INTRAVENOUS; SUBCUTANEOUS at 03:07

## 2024-04-25 RX ADMIN — Medication 1000 MILLIGRAM(S): at 00:17

## 2024-04-25 RX ADMIN — AMPICILLIN SODIUM AND SULBACTAM SODIUM 200 GRAM(S): 250; 125 INJECTION, POWDER, FOR SUSPENSION INTRAMUSCULAR; INTRAVENOUS at 23:07

## 2024-04-25 RX ADMIN — HYDROMORPHONE HYDROCHLORIDE 0.5 MILLIGRAM(S): 2 INJECTION INTRAMUSCULAR; INTRAVENOUS; SUBCUTANEOUS at 23:00

## 2024-04-25 RX ADMIN — ENOXAPARIN SODIUM 40 MILLIGRAM(S): 100 INJECTION SUBCUTANEOUS at 12:28

## 2024-04-25 RX ADMIN — HYDROMORPHONE HYDROCHLORIDE 1 MILLIGRAM(S): 2 INJECTION INTRAMUSCULAR; INTRAVENOUS; SUBCUTANEOUS at 04:07

## 2024-04-25 RX ADMIN — GABAPENTIN 300 MILLIGRAM(S): 400 CAPSULE ORAL at 21:55

## 2024-04-25 RX ADMIN — Medication 1 APPLICATION(S): at 12:36

## 2024-04-25 RX ADMIN — LACOSAMIDE 150 MILLIGRAM(S): 50 TABLET ORAL at 05:16

## 2024-04-25 RX ADMIN — Medication 81 MILLIGRAM(S): at 12:29

## 2024-04-25 RX ADMIN — GABAPENTIN 300 MILLIGRAM(S): 400 CAPSULE ORAL at 14:11

## 2024-04-25 RX ADMIN — AMPICILLIN SODIUM AND SULBACTAM SODIUM 200 GRAM(S): 250; 125 INJECTION, POWDER, FOR SUSPENSION INTRAMUSCULAR; INTRAVENOUS at 05:17

## 2024-04-25 RX ADMIN — Medication 1 TABLET(S): at 12:29

## 2024-04-25 RX ADMIN — CHLORHEXIDINE GLUCONATE 1 APPLICATION(S): 213 SOLUTION TOPICAL at 14:11

## 2024-04-25 RX ADMIN — AMPICILLIN SODIUM AND SULBACTAM SODIUM 200 GRAM(S): 250; 125 INJECTION, POWDER, FOR SUSPENSION INTRAMUSCULAR; INTRAVENOUS at 17:36

## 2024-04-25 RX ADMIN — SENNA PLUS 2 TABLET(S): 8.6 TABLET ORAL at 21:55

## 2024-04-25 RX ADMIN — ATORVASTATIN CALCIUM 20 MILLIGRAM(S): 80 TABLET, FILM COATED ORAL at 21:55

## 2024-04-25 RX ADMIN — TAMSULOSIN HYDROCHLORIDE 0.4 MILLIGRAM(S): 0.4 CAPSULE ORAL at 21:56

## 2024-04-25 RX ADMIN — HYDROMORPHONE HYDROCHLORIDE 0.5 MILLIGRAM(S): 2 INJECTION INTRAMUSCULAR; INTRAVENOUS; SUBCUTANEOUS at 22:01

## 2024-04-25 RX ADMIN — GABAPENTIN 300 MILLIGRAM(S): 400 CAPSULE ORAL at 05:16

## 2024-04-25 RX ADMIN — POLYETHYLENE GLYCOL 3350 17 GRAM(S): 17 POWDER, FOR SOLUTION ORAL at 12:40

## 2024-04-25 RX ADMIN — LACOSAMIDE 150 MILLIGRAM(S): 50 TABLET ORAL at 17:36

## 2024-04-25 RX ADMIN — AMPICILLIN SODIUM AND SULBACTAM SODIUM 200 GRAM(S): 250; 125 INJECTION, POWDER, FOR SUSPENSION INTRAMUSCULAR; INTRAVENOUS at 12:29

## 2024-04-25 NOTE — PROGRESS NOTE ADULT - SUBJECTIVE AND OBJECTIVE BOX
SUBJECTIVE/ OVERNIGHT EVENTS:  --- Coverage for Dr. Diez ---   No events.  Feel well.  no complaints.   no cp, no sob, no n/v/d.  no abd pain.   dressing changed this am, planning would vac per the team?      --------------------------------------------------------------------------------------------  LABS:                        10.3   4.92  )-----------( 357      ( 25 Apr 2024 06:48 )             31.9     04-25    141  |  107  |  14  ----------------------------<  89  4.0   |  22  |  0.86    Ca    8.6      25 Apr 2024 06:48    TPro  6.2  /  Alb  3.5  /  TBili  0.2  /  DBili  x   /  AST  9<L>  /  ALT  16  /  AlkPhos  80  04-24      CAPILLARY BLOOD GLUCOSE            Urinalysis Basic - ( 25 Apr 2024 06:48 )    Color: x / Appearance: x / SG: x / pH: x  Gluc: 89 mg/dL / Ketone: x  / Bili: x / Urobili: x   Blood: x / Protein: x / Nitrite: x   Leuk Esterase: x / RBC: x / WBC x   Sq Epi: x / Non Sq Epi: x / Bacteria: x        RADIOLOGY & ADDITIONAL TESTS:    Imaging Personally Reviewed:  [x] YES  [ ] NO    Consultant(s) Notes Reviewed:  [x] YES  [ ] NO    MEDICATIONS  (STANDING):  ampicillin/sulbactam  IVPB      ampicillin/sulbactam  IVPB 3 Gram(s) IV Intermittent every 6 hours  aspirin  chewable 81 milliGRAM(s) Oral daily  atorvastatin 20 milliGRAM(s) Oral at bedtime  chlorhexidine 2% Cloths 1 Application(s) Topical daily  Dakins Solution - 1/4 Strength 1 Application(s) Topical daily  enoxaparin Injectable 40 milliGRAM(s) SubCutaneous every 24 hours  gabapentin 300 milliGRAM(s) Oral every 8 hours  lacosamide 150 milliGRAM(s) Oral two times a day  multivitamin 1 Tablet(s) Oral daily  polyethylene glycol 3350 17 Gram(s) Oral daily  senna 2 Tablet(s) Oral at bedtime  tamsulosin 0.4 milliGRAM(s) Oral at bedtime    MEDICATIONS  (PRN):  bacitracin   Ointment 1 Application(s) Topical daily PRN daily dressing change  HYDROmorphone  Injectable 1 milliGRAM(s) IV Push every 6 hours PRN Moderate Pain (4 - 6)  meclizine 12.5 milliGRAM(s) Oral three times a day PRN Dizziness      Care Discussed with Consultants/Other Providers [x] YES  [ ] NO    Vital Signs Last 24 Hrs  T(C): 36.8 (25 Apr 2024 00:12), Max: 37 (24 Apr 2024 09:15)  T(F): 98.2 (25 Apr 2024 00:12), Max: 98.6 (24 Apr 2024 09:15)  HR: 62 (25 Apr 2024 00:12) (62 - 68)  BP: 104/67 (25 Apr 2024 00:12) (104/67 - 114/76)  BP(mean): --  RR: 18 (25 Apr 2024 00:12) (18 - 18)  SpO2: 96% (25 Apr 2024 00:12) (96% - 99%)    Parameters below as of 25 Apr 2024 00:12  Patient On (Oxygen Delivery Method): room air      I&O's Summary    24 Apr 2024 07:01  -  25 Apr 2024 07:00  --------------------------------------------------------  IN: 650 mL / OUT: 800 mL / NET: -150 mL      PHYSICAL EXAM:  GENERAL: NAD, well-developed, comfortable  HEAD:  Atraumatic, Normocephalic  EYES: EOMI, PERRLA, conjunctiva and sclera clear  NECK: Supple, No JVD  CHEST/LUNG: Clear to auscultation bilaterally; No wheeze  HEART: Regular rate and rhythm; No murmurs, rubs, or gallops  ABDOMEN: Soft, Nontender, Nondistended; Bowel sounds present  Neuro: AAOx3, no focal weakness, +mild expressive aphasia, 5/5 b/l extremity strength  EXTREMITIES:  2+ Peripheral Pulses, No clubbing, cyanosis, or edema. + left leg wound dressing d/c/i  SKIN: No rashes or lesions

## 2024-04-25 NOTE — PROGRESS NOTE ADULT - ASSESSMENT
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 On admission, vitals significant for hypotension and labs showing anemia with CTA showing large hematoma of the LLE and small RLE hematoma with repeat CTA showing interval increase of the hematoma and new small LLE hematoma s/p 2units pRBCs. He is now admitted to the MICU for further management.       # LE Hematoma, Anemia   - 4/4 CT A w/ Mod- large SubC hematoma in the anteromedial L upper thigh containing scattered foci of high attenuation concerning for active hemorrhage within the hematoma; Small collection in the anterior upper right thigh suspicious for a hematoma. Mild subcutaneous inflammatory change in the visualized anterior and lateral upper right thigh with associated foci of subcutaneous gas.   - 4/5 CT w/ Interval increase in size of an anteromedial L upper thigh subc  hematoma with scattered foci of high attenuation within the hematoma compatible with active hemorrhage; New left gracilis intramuscular hematoma which could be extending from subcutaneous hematoma.  - Duplex negative for DVT   - S/P Hematoma evacuation with Vascular on 4/5. Drains DC'd as per Vascular   - Pt with episode of worsening LLE edema 4/13, drop in Hgb. ASA and Lovenox held. S/P 2 units of PRBCs   - R/O Compartment syndrome. CT LE w/ evolving hematoma of medial L thigh, decreased in side, negative for active hemorrhage   - Pulse checks per protocol;  Monitor LE closely  - IR, Vascular, MICU, Wound care appreciated   - Plastic surgery eval appreciated; F/u recs --> wound is not yet amenable to reconstitution. Outpatient PSx follow up upon DC   - Seen by IR 4/16, no drainage offered, recommended for conservative management with wound care for L groin hematoma   - S/P OR with Vascular for wash out on 4/19. wound vac removed, drains removed   - Lovenox PPX dose resumed. Monitor H/H   - wound vac placement on Friday     Skin infection   - Surgical swab w. + E. coli, E. faecalis Staph aureus, rare Enterococcus avium   - on Zosyn for ABX, S  - ID eval consulted; abx per ID.     #Leukocytosis  - Noted to have elevated WBC   - Trend WBC and fever curve, If febrile, check pan cultures --> Down-trending     #Seizure disorder  # CVA with residual expressive aphasia  - C/w home AEDs (lacosamide and vyanese)  - C/w home gabapentin   - Seizure precautions  - Neuro eval appreciated; F/u recs     #HLD  - C/w home Lipitor    #BPH  - C/w flomax    #DVTs  - hx of bilateral DVTs --> Repeat duplex negative     PPX  - Lovenox

## 2024-04-25 NOTE — PROGRESS NOTE ADULT - SUBJECTIVE AND OBJECTIVE BOX
Neurology        S: patient seen.  doing okay ;      Medications: MEDICATIONS  (STANDING):  ampicillin/sulbactam  IVPB      ampicillin/sulbactam  IVPB 3 Gram(s) IV Intermittent every 6 hours  aspirin  chewable 81 milliGRAM(s) Oral daily  atorvastatin 20 milliGRAM(s) Oral at bedtime  chlorhexidine 2% Cloths 1 Application(s) Topical daily  Dakins Solution - 1/4 Strength 1 Application(s) Topical daily  enoxaparin Injectable 40 milliGRAM(s) SubCutaneous every 24 hours  gabapentin 300 milliGRAM(s) Oral every 8 hours  lacosamide 150 milliGRAM(s) Oral two times a day  multivitamin 1 Tablet(s) Oral daily  polyethylene glycol 3350 17 Gram(s) Oral daily  senna 2 Tablet(s) Oral at bedtime  tamsulosin 0.4 milliGRAM(s) Oral at bedtime    MEDICATIONS  (PRN):  bacitracin   Ointment 1 Application(s) Topical daily PRN daily dressing change  HYDROmorphone  Injectable 1 milliGRAM(s) IV Push every 6 hours PRN Moderate Pain (4 - 6)  meclizine 12.5 milliGRAM(s) Oral three times a day PRN Dizziness       Vitals:  Vital Signs Last 24 Hrs  T(C): 36.4 (25 Apr 2024 16:14), Max: 36.8 (25 Apr 2024 00:12)  T(F): 97.6 (25 Apr 2024 16:14), Max: 98.2 (25 Apr 2024 00:12)  HR: 66 (25 Apr 2024 16:14) (55 - 66)  BP: 110/70 (25 Apr 2024 16:14) (104/67 - 110/70)  BP(mean): --  RR: 18 (25 Apr 2024 16:14) (18 - 18)  SpO2: 97% (25 Apr 2024 16:14) (96% - 97%)    Parameters below as of 25 Apr 2024 16:14  Patient On (Oxygen Delivery Method): room air                 General Exam:   General Appearance: Appropriately dressed and in no acute distress       Head: Normocephalic, atraumatic and no dysmorphic features  Ear, Nose, and Throat: Moist mucous membranes  CVS: S1S2+  Resp: No SOB, no wheeze or rhonchi  GI: soft NT/ND  Extremities: No edema or cyanosis L leg thigh hematoma   Skin: No bruises or rashes     Neurological Exam:  Mental Status: Awake, alert and oriented x 3 minimal WFD .  Able to follow simple and complex verbal commands. Able to name and repeat. fluent speech. No obvious aphasia or dysarthria noted.   Cranial Nerves: PERRL, EOMI, VFFC, sensation V1-V3 intact,  no obvious facial asymmetry, equal elevation of palate, scm/trap 5/5, tongue is midline on protrusion. no obvious papilledema on fundoscopic exam. hearing is grossly intact.   Motor: Normal bulk, tone and strength throughout. Fine finger movements were intact and symmetric. no tremors or drift noted.    Sensation: Intact to light touch and pinprick throughout. no right/left confusion. no extinction to tactile on DSS.  .   Reflexes: 1+ throughout at biceps, brachioradialis, triceps, patellars and ankles bilaterally and equal. No clonus. R toe and L toe were both downgoing.  Coordination: No dysmetria on FNF    Gait: deferred     Data/Labs/Imaging which I personally reviewed.             LABS:                          10.3   4.92  )-----------( 357      ( 25 Apr 2024 06:48 )             31.9     04-25    141  |  107  |  14  ----------------------------<  89  4.0   |  22  |  0.86    Ca    8.6      25 Apr 2024 06:48    TPro  6.2  /  Alb  3.5  /  TBili  0.2  /  DBili  x   /  AST  9<L>  /  ALT  16  /  AlkPhos  80  04-24    LIVER FUNCTIONS - ( 24 Apr 2024 06:47 )  Alb: 3.5 g/dL / Pro: 6.2 g/dL / ALK PHOS: 80 U/L / ALT: 16 U/L / AST: 9 U/L / GGT: x             Urinalysis Basic - ( 25 Apr 2024 06:48 )    Color: x / Appearance: x / SG: x / pH: x  Gluc: 89 mg/dL / Ketone: x  / Bili: x / Urobili: x   Blood: x / Protein: x / Nitrite: x   Leuk Esterase: x / RBC: x / WBC x   Sq Epi: x / Non Sq Epi: x / Bacteria: x

## 2024-04-25 NOTE — PROGRESS NOTE ADULT - ASSESSMENT
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 Noticed within the last hour. No falls or trauma to the area. Administered Lovenox just prior to arrival. Denies fevers, chills, nausea, vomiting, urinary symptoms. No recent surgeries  found left thigh hematoma   s/p 2 units of PRBC   s/p hematoma evacuation 4/5, now doing well  s/p PRBC again   s/p OR 4/19 now with wound vac     Impression:   1) chronic strokes, resid minimal WFD  2) seizure d/o 2/2 strokes  3) ADHD  4) vertigo BPPV, stable   5) new L thigh hematoma   6) LLE compartment syndrome     - on ampicillin/sulbactam   - f/u vasc and vac team ; wound vac  4/26   - back on ASA 81mg daily and lovenox 40mg      - monitor H/H.      - monitor for compartment syndrome   - Lovenox full dose now held.  was injecting in thigh at site of hematoma ; last admission Lovenox started for DVT and changed to BID dosing   - for ADHD gets Vyvanse 50mg daily  - for seizure he is on vimpat 150 mg BID  - statin therapy with LDL goal < 70mg/dL; atorvastatin 20 at home   - meclizline PRN   - PT/OT   - check FS, glucose control <180  - GI/DVT ppx   - Thank you for allowing me to participate in the care of this patient. Call with questions.      Braxton Forde MD  Vascular Neurology  Office: [phone]

## 2024-04-26 ENCOUNTER — TRANSCRIPTION ENCOUNTER (OUTPATIENT)
Age: 54
End: 2024-04-26

## 2024-04-26 RX ORDER — SENNA PLUS 8.6 MG/1
2 TABLET ORAL
Qty: 0 | Refills: 0 | DISCHARGE
Start: 2024-04-26

## 2024-04-26 RX ORDER — HYDROMORPHONE HYDROCHLORIDE 2 MG/ML
1 INJECTION INTRAMUSCULAR; INTRAVENOUS; SUBCUTANEOUS
Qty: 0 | Refills: 0 | DISCHARGE
Start: 2024-04-26

## 2024-04-26 RX ORDER — POLYETHYLENE GLYCOL 3350 17 G/17G
17 POWDER, FOR SOLUTION ORAL
Qty: 0 | Refills: 0 | DISCHARGE
Start: 2024-04-26

## 2024-04-26 RX ORDER — MECLIZINE HCL 12.5 MG
1 TABLET ORAL
Qty: 0 | Refills: 0 | DISCHARGE
Start: 2024-04-26

## 2024-04-26 RX ORDER — HYDROMORPHONE HYDROCHLORIDE 2 MG/ML
2 INJECTION INTRAMUSCULAR; INTRAVENOUS; SUBCUTANEOUS EVERY 6 HOURS
Refills: 0 | Status: DISCONTINUED | OUTPATIENT
Start: 2024-04-26 | End: 2024-04-27

## 2024-04-26 RX ORDER — HYDROMORPHONE HYDROCHLORIDE 2 MG/ML
0.5 INJECTION INTRAMUSCULAR; INTRAVENOUS; SUBCUTANEOUS ONCE
Refills: 0 | Status: DISCONTINUED | OUTPATIENT
Start: 2024-04-26 | End: 2024-04-26

## 2024-04-26 RX ADMIN — AMPICILLIN SODIUM AND SULBACTAM SODIUM 200 GRAM(S): 250; 125 INJECTION, POWDER, FOR SUSPENSION INTRAMUSCULAR; INTRAVENOUS at 04:49

## 2024-04-26 RX ADMIN — HYDROMORPHONE HYDROCHLORIDE 2 MILLIGRAM(S): 2 INJECTION INTRAMUSCULAR; INTRAVENOUS; SUBCUTANEOUS at 22:45

## 2024-04-26 RX ADMIN — POLYETHYLENE GLYCOL 3350 17 GRAM(S): 17 POWDER, FOR SOLUTION ORAL at 11:23

## 2024-04-26 RX ADMIN — GABAPENTIN 300 MILLIGRAM(S): 400 CAPSULE ORAL at 13:18

## 2024-04-26 RX ADMIN — HYDROMORPHONE HYDROCHLORIDE 1 MILLIGRAM(S): 2 INJECTION INTRAMUSCULAR; INTRAVENOUS; SUBCUTANEOUS at 08:23

## 2024-04-26 RX ADMIN — TAMSULOSIN HYDROCHLORIDE 0.4 MILLIGRAM(S): 0.4 CAPSULE ORAL at 21:52

## 2024-04-26 RX ADMIN — HYDROMORPHONE HYDROCHLORIDE 2 MILLIGRAM(S): 2 INJECTION INTRAMUSCULAR; INTRAVENOUS; SUBCUTANEOUS at 12:50

## 2024-04-26 RX ADMIN — HYDROMORPHONE HYDROCHLORIDE 1 MILLIGRAM(S): 2 INJECTION INTRAMUSCULAR; INTRAVENOUS; SUBCUTANEOUS at 07:51

## 2024-04-26 RX ADMIN — LACOSAMIDE 150 MILLIGRAM(S): 50 TABLET ORAL at 04:53

## 2024-04-26 RX ADMIN — Medication 1 APPLICATION(S): at 11:25

## 2024-04-26 RX ADMIN — CHLORHEXIDINE GLUCONATE 1 APPLICATION(S): 213 SOLUTION TOPICAL at 11:26

## 2024-04-26 RX ADMIN — HYDROMORPHONE HYDROCHLORIDE 2 MILLIGRAM(S): 2 INJECTION INTRAMUSCULAR; INTRAVENOUS; SUBCUTANEOUS at 21:51

## 2024-04-26 RX ADMIN — HYDROMORPHONE HYDROCHLORIDE 0.5 MILLIGRAM(S): 2 INJECTION INTRAMUSCULAR; INTRAVENOUS; SUBCUTANEOUS at 04:49

## 2024-04-26 RX ADMIN — HYDROMORPHONE HYDROCHLORIDE 0.5 MILLIGRAM(S): 2 INJECTION INTRAMUSCULAR; INTRAVENOUS; SUBCUTANEOUS at 05:45

## 2024-04-26 RX ADMIN — GABAPENTIN 300 MILLIGRAM(S): 400 CAPSULE ORAL at 21:52

## 2024-04-26 RX ADMIN — ENOXAPARIN SODIUM 40 MILLIGRAM(S): 100 INJECTION SUBCUTANEOUS at 11:27

## 2024-04-26 RX ADMIN — Medication 81 MILLIGRAM(S): at 11:23

## 2024-04-26 RX ADMIN — HYDROMORPHONE HYDROCHLORIDE 2 MILLIGRAM(S): 2 INJECTION INTRAMUSCULAR; INTRAVENOUS; SUBCUTANEOUS at 10:50

## 2024-04-26 RX ADMIN — Medication 1 TABLET(S): at 11:23

## 2024-04-26 RX ADMIN — ATORVASTATIN CALCIUM 20 MILLIGRAM(S): 80 TABLET, FILM COATED ORAL at 21:51

## 2024-04-26 RX ADMIN — SENNA PLUS 2 TABLET(S): 8.6 TABLET ORAL at 21:51

## 2024-04-26 RX ADMIN — GABAPENTIN 300 MILLIGRAM(S): 400 CAPSULE ORAL at 04:54

## 2024-04-26 RX ADMIN — LACOSAMIDE 150 MILLIGRAM(S): 50 TABLET ORAL at 17:06

## 2024-04-26 NOTE — DISCHARGE NOTE PROVIDER - CARE PROVIDERS DIRECT ADDRESSES
,DirectAddress_Unknown,[email or web address]

## 2024-04-26 NOTE — PROGRESS NOTE ADULT - ASSESSMENT
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 Noticed within the last hour. No falls or trauma to the area. Administered Lovenox just prior to arrival. Denies fevers, chills, nausea, vomiting, urinary symptoms. No recent surgeries  found left thigh hematoma   s/p 2 units of PRBC   s/p hematoma evacuation 4/5, now doing well  s/p PRBC again   s/p OR 4/19 now with wound vac     Impression:   1) chronic strokes, resid minimal WFD  2) seizure d/o 2/2 strokes  3) ADHD  4) vertigo BPPV, stable   5) new L thigh hematoma   6) LLE compartment syndrome     - was on ampicillin/sulbactam completed   - f/u vasc and vac team ; wound vac  4/26   - back on ASA 81mg daily and lovenox 40mg      - monitor H/H.      - monitor for compartment syndrome   - Lovenox full dose now held.  was injecting in thigh at site of hematoma ; last admission Lovenox started for DVT and changed to BID dosing   - for ADHD gets Vyvanse 50mg daily  - for seizure he is on vimpat 150 mg BID  - statin therapy with LDL goal < 70mg/dL; atorvastatin 20 at home   - meclizline PRN   - PT/OT   - check FS, glucose control <180  - GI/DVT ppx   - Thank you for allowing me to participate in the care of this patient. Call with questions.      Braxton Forde MD  Vascular Neurology  Office: [phone]

## 2024-04-26 NOTE — DISCHARGE NOTE PROVIDER - CARE PROVIDER_API CALL
Rodolfo Johnson  Internal Medicine  [address] [address]  Phone: [phone]  Fax: [phone]  Follow Up Time:     Mariah Christie  Vascular Surgery  [address] [facility], [address]  [place] [address]  Phone: [phone]  Fax: [phone]  Follow Up Time:

## 2024-04-26 NOTE — PROGRESS NOTE ADULT - SUBJECTIVE AND OBJECTIVE BOX
SUBJECTIVE/ OVERNIGHT EVENTS:  --- Coverage for Dr. Diez ---   discomfort but overall well.  pain meds helping.  no cp, no sob, no n/v/d. no abdominal pain.  no headache, no dizziness.   wound vac tentatively today.    --------------------------------------------------------------------------------------------  LABS:                        10.3   4.92  )-----------( 357      ( 25 Apr 2024 06:48 )             31.9     04-25    141  |  107  |  14  ----------------------------<  89  4.0   |  22  |  0.86    Ca    8.6      25 Apr 2024 06:48        CAPILLARY BLOOD GLUCOSE            Urinalysis Basic - ( 25 Apr 2024 06:48 )    Color: x / Appearance: x / SG: x / pH: x  Gluc: 89 mg/dL / Ketone: x  / Bili: x / Urobili: x   Blood: x / Protein: x / Nitrite: x   Leuk Esterase: x / RBC: x / WBC x   Sq Epi: x / Non Sq Epi: x / Bacteria: x        RADIOLOGY & ADDITIONAL TESTS:    Imaging Personally Reviewed:  [x] YES  [ ] NO    Consultant(s) Notes Reviewed:  [x] YES  [ ] NO    MEDICATIONS  (STANDING):  acetaminophen     Tablet .. 650 milliGRAM(s) Oral once  ampicillin/sulbactam  IVPB      ampicillin/sulbactam  IVPB 3 Gram(s) IV Intermittent every 6 hours  aspirin  chewable 81 milliGRAM(s) Oral daily  atorvastatin 20 milliGRAM(s) Oral at bedtime  chlorhexidine 2% Cloths 1 Application(s) Topical daily  Dakins Solution - 1/4 Strength 1 Application(s) Topical daily  enoxaparin Injectable 40 milliGRAM(s) SubCutaneous every 24 hours  gabapentin 300 milliGRAM(s) Oral every 8 hours  lacosamide 150 milliGRAM(s) Oral two times a day  multivitamin 1 Tablet(s) Oral daily  polyethylene glycol 3350 17 Gram(s) Oral daily  senna 2 Tablet(s) Oral at bedtime  tamsulosin 0.4 milliGRAM(s) Oral at bedtime    MEDICATIONS  (PRN):  bacitracin   Ointment 1 Application(s) Topical daily PRN daily dressing change  HYDROmorphone  Injectable 1 milliGRAM(s) IV Push every 6 hours PRN Moderate Pain (4 - 6)  meclizine 12.5 milliGRAM(s) Oral three times a day PRN Dizziness      Care Discussed with Consultants/Other Providers [x] YES  [ ] NO    Vital Signs Last 24 Hrs  T(C): 36.3 (25 Apr 2024 23:16), Max: 36.5 (25 Apr 2024 08:29)  T(F): 97.4 (25 Apr 2024 23:16), Max: 97.7 (25 Apr 2024 08:29)  HR: 62 (25 Apr 2024 23:16) (55 - 66)  BP: 109/68 (25 Apr 2024 23:16) (105/69 - 110/70)  BP(mean): --  RR: 18 (25 Apr 2024 23:16) (18 - 18)  SpO2: 97% (25 Apr 2024 23:16) (97% - 97%)    Parameters below as of 25 Apr 2024 23:16  Patient On (Oxygen Delivery Method): room air      I&O's Summary    25 Apr 2024 07:01  -  26 Apr 2024 07:00  --------------------------------------------------------  IN: 640 mL / OUT: 300 mL / NET: 340 mL      PHYSICAL EXAM:  GENERAL: NAD, well-developed, comfortable  HEAD:  Atraumatic, Normocephalic  EYES: EOMI, PERRLA, conjunctiva and sclera clear  NECK: Supple, No JVD  CHEST/LUNG: Clear to auscultation bilaterally; No wheeze  HEART: Regular rate and rhythm; No murmurs, rubs, or gallops  ABDOMEN: Soft, Nontender, Nondistended; Bowel sounds present  Neuro: AAOx3, no focal weakness, +mild expressive aphasia, 5/5 b/l extremity strength  EXTREMITIES:  2+ Peripheral Pulses, No clubbing, cyanosis, or edema. + left leg wound dressing d/c/i  SKIN: No rashes or lesions

## 2024-04-26 NOTE — PROGRESS NOTE ADULT - SUBJECTIVE AND OBJECTIVE BOX
Neurology        S: patient seen.  doing okay ;        Medications: MEDICATIONS  (STANDING):  acetaminophen     Tablet .. 650 milliGRAM(s) Oral once  aspirin  chewable 81 milliGRAM(s) Oral daily  atorvastatin 20 milliGRAM(s) Oral at bedtime  chlorhexidine 2% Cloths 1 Application(s) Topical daily  Dakins Solution - 1/4 Strength 1 Application(s) Topical daily  enoxaparin Injectable 40 milliGRAM(s) SubCutaneous every 24 hours  gabapentin 300 milliGRAM(s) Oral every 8 hours  lacosamide 150 milliGRAM(s) Oral two times a day  multivitamin 1 Tablet(s) Oral daily  polyethylene glycol 3350 17 Gram(s) Oral daily  senna 2 Tablet(s) Oral at bedtime  tamsulosin 0.4 milliGRAM(s) Oral at bedtime    MEDICATIONS  (PRN):  bacitracin   Ointment 1 Application(s) Topical daily PRN daily dressing change  HYDROmorphone   Tablet 2 milliGRAM(s) Oral every 6 hours PRN Severe Pain (7 - 10)  meclizine 12.5 milliGRAM(s) Oral three times a day PRN Dizziness       Vitals:  Vital Signs Last 24 Hrs  T(C): 36.9 (26 Apr 2024 10:21), Max: 36.9 (26 Apr 2024 10:21)  T(F): 98.4 (26 Apr 2024 10:21), Max: 98.4 (26 Apr 2024 10:21)  HR: 62 (25 Apr 2024 23:16) (62 - 66)  BP: 95/82 (26 Apr 2024 10:21) (95/82 - 110/70)  BP(mean): --  RR: 18 (26 Apr 2024 10:21) (18 - 18)  SpO2: 96% (26 Apr 2024 10:21) (96% - 97%)    Parameters below as of 26 Apr 2024 10:21  Patient On (Oxygen Delivery Method): room air           General Exam:   General Appearance: Appropriately dressed and in no acute distress       Head: Normocephalic, atraumatic and no dysmorphic features  Ear, Nose, and Throat: Moist mucous membranes  CVS: S1S2+  Resp: No SOB, no wheeze or rhonchi  GI: soft NT/ND  Extremities: No edema or cyanosis L leg thigh hematoma   Skin: No bruises or rashes     Neurological Exam:  Mental Status: Awake, alert and oriented x 3 minimal WFD .  Able to follow simple and complex verbal commands. Able to name and repeat. fluent speech. No obvious aphasia or dysarthria noted.   Cranial Nerves: PERRL, EOMI, VFFC, sensation V1-V3 intact,  no obvious facial asymmetry, equal elevation of palate, scm/trap 5/5, tongue is midline on protrusion. no obvious papilledema on fundoscopic exam. hearing is grossly intact.   Motor: Normal bulk, tone and strength throughout. Fine finger movements were intact and symmetric. no tremors or drift noted.    Sensation: Intact to light touch and pinprick throughout. no right/left confusion. no extinction to tactile on DSS.  .   Reflexes: 1+ throughout at biceps, brachioradialis, triceps, patellars and ankles bilaterally and equal. No clonus. R toe and L toe were both downgoing.  Coordination: No dysmetria on FNF    Gait: deferred     Data/Labs/Imaging which I personally reviewed.           LABS:                          10.3   4.92  )-----------( 357      ( 25 Apr 2024 06:48 )             31.9     04-25    141  |  107  |  14  ----------------------------<  89  4.0   |  22  |  0.86    Ca    8.6      25 Apr 2024 06:48          Urinalysis Basic - ( 25 Apr 2024 06:48 )    Color: x / Appearance: x / SG: x / pH: x  Gluc: 89 mg/dL / Ketone: x  / Bili: x / Urobili: x   Blood: x / Protein: x / Nitrite: x   Leuk Esterase: x / RBC: x / WBC x   Sq Epi: x / Non Sq Epi: x / Bacteria: x

## 2024-04-26 NOTE — PROGRESS NOTE ADULT - ASSESSMENT
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 On admission, vitals significant for hypotension and labs showing anemia with CTA showing large hematoma of the LLE and small RLE hematoma with repeat CTA showing interval increase of the hematoma and new small LLE hematoma s/p 2units pRBCs. He is now admitted to the MICU for further management.     # LE Hematoma, Anemia   - 4/4 CT A w/ Mod- large SubC hematoma in the anteromedial L upper thigh containing scattered foci of high attenuation concerning for active hemorrhage within the hematoma; Small collection in the anterior upper right thigh suspicious for a hematoma. Mild subcutaneous inflammatory change in the visualized anterior and lateral upper right thigh with associated foci of subcutaneous gas.   - 4/5 CT w/ Interval increase in size of an anteromedial L upper thigh subc  hematoma with scattered foci of high attenuation within the hematoma compatible with active hemorrhage; New left gracilis intramuscular hematoma which could be extending from subcutaneous hematoma.  - Duplex negative for DVT   - S/P Hematoma evacuation with Vascular on 4/5. Drains DC'd as per Vascular   - Pt with episode of worsening LLE edema 4/13, drop in Hgb. ASA and Lovenox held. S/P 2 units of PRBCs   - R/O Compartment syndrome. CT LE w/ evolving hematoma of medial L thigh, decreased in side, negative for active hemorrhage   - Pulse checks per protocol;  Monitor LE closely  - IR, Vascular, MICU, Wound care appreciated   - Plastic surgery eval appreciated; F/u recs --> wound is not yet amenable to reconstitution. Outpatient PSx follow up upon DC   - Seen by IR 4/16, no drainage offered, recommended for conservative management with wound care for L groin hematoma   - S/P OR with Vascular for wash out on 4/19. wound vac removed, drains removed   - Lovenox PPX dose resumed. Monitor H/H   - wound vac placement on Friday as per vascular/ wound van team.     Skin infection   - Surgical swab w. + E. coli, E. faecalis Staph aureus, rare Enterococcus avium   - on Zosyn for ABX, S  - ID eval consulted; abx per ID.     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 On admission, vitals significant for hypotension and labs showing anemia with CTA showing large hematoma of the LLE and small RLE hematoma with repeat CTA showing interval increase of the hematoma and new small LLE hematoma s/p 2units pRBCs. He is now admitted to the MICU for further management.     # LE Hematoma, Anemia   - 4/4 CT A w/ Mod- large SubC hematoma in the anteromedial L upper thigh containing scattered foci of high attenuation concerning for active hemorrhage within the hematoma; Small collection in the anterior upper right thigh suspicious for a hematoma. Mild subcutaneous inflammatory change in the visualized anterior and lateral upper right thigh with associated foci of subcutaneous gas.   - 4/5 CT w/ Interval increase in size of an anteromedial L upper thigh subc  hematoma with scattered foci of high attenuation within the hematoma compatible with active hemorrhage; New left gracilis intramuscular hematoma which could be extending from subcutaneous hematoma.  - Duplex negative for DVT   - S/P Hematoma evacuation with Vascular on 4/5. Drains DC'd as per Vascular   - Pt with episode of worsening LLE edema 4/13, drop in Hgb. ASA and Lovenox held. S/P 2 units of PRBCs   - R/O Compartment syndrome. CT LE w/ evolving hematoma of medial L thigh, decreased in side, negative for active hemorrhage   - Pulse checks per protocol;  Monitor LE closely  - IR, Vascular, MICU, Wound care appreciated   - Plastic surgery eval appreciated; F/u recs --> wound is not yet amenable to reconstitution. Outpatient PSx follow up upon DC   - Seen by IR 4/16, no drainage offered, recommended for conservative management with wound care for L groin hematoma   - S/P OR with Vascular for wash out on 4/19. wound vac removed, drains removed   - Lovenox PPX dose resumed. Monitor H/H   - wound vac placement on Friday as per vascular/ wound van team.     Skin infection   - Surgical swab w. + E. coli, E. faecalis Staph aureus, rare Enterococcus avium   - on Zosyn for ABX, S  - ID eval consulted; abx per ID (completed abx 4/25)     #Leukocytosis  - Noted to have elevated WBC   - Trend WBC and fever curve, If febrile, check pan cultures --> Down-trending     #Seizure disorder  # CVA with residual expressive aphasia  - C/w home AEDs (lacosamide and vyanese)  - C/w home gabapentin   - Seizure precautions  - Neuro eval appreciated; F/u recs     #HLD  - C/w home Lipitor    #BPH  - C/w flomax    #DVTs  - hx of bilateral DVTs --> Repeat duplex negative     PPX  - Lovenox

## 2024-04-26 NOTE — DISCHARGE NOTE PROVIDER - HOSPITAL COURSE
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 Otherwise, there was no trauma to the area nor falls. The pain is described as throbbing and does not radiate. He did not try any supportive measures and came to the hospital directly. Of note, the patient was on eliquis when he was found to have bilateral DVTs, however this was changed to lovenox when he had a CVA while on eliquis.    On admission, the patient's vitals were noted to be within normal limits, however he then became hypotensive for which he received 2u pRBCs and protamine sulfate. His labs were significant for anemia and leukocytosis and a CTA showed large hematoma in the LLE and small hematoma of the RLE. He is now admitted to the MICU for further management.    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 Otherwise, there was no trauma to the area nor falls. The pain is described as throbbing and does not radiate. He did not try any supportive measures and came to the hospital directly. Of note, the patient was on eliquis when he was found to have bilateral DVTs, however this was changed to lovenox when he had a CVA while on eliquis.    On admission, the patient's vitals were noted to be within normal limits, however he then became hypotensive for which he received 2u pRBCs and protamine sulfate. His labs were significant for anemia and leukocytosis and a CTA showed large hematoma in the LLE and small hematoma of the RLE. He is now admitted to the MICU for further management.    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 On admission, vitals significant for hypotension and labs showing anemia with CTA showing large hematoma of the LLE and small RLE hematoma with repeat CTA showing interval increase of the hematoma and new small LLE hematoma s/p 2units PRBCs. He was  admitted to the MICU for further management.     # LE Hematoma, Anemia   - 4/4 CT A w/ Mod- large SubC hematoma in the anteromedial L upper thigh containing scattered foci of high attenuation concerning for active hemorrhage within the hematoma; Small collection in the anterior upper right thigh suspicious for a hematoma. Mild subcutaneous inflammatory change in the visualized anterior and lateral upper right thigh with associated foci of subcutaneous gas.   - 4/5 CT w/ Interval increase in size of an anteromedial L upper thigh subc  hematoma with scattered foci of high attenuation within the hematoma compatible with active hemorrhage; New left gracilis intramuscular hematoma which could be extending from subcutaneous hematoma.  - Duplex negative for DVT   - S/P Hematoma evacuation with Vascular on 4/5. Drains DC'd as per Vascular   - Pt with episode of worsening LLE edema 4/13, drop in Hgb. ASA and Lovenox held. S/P 2 units of PRBCs   - R/O Compartment syndrome. CT LE w/ evolving hematoma of medial L thigh, decreased in side, negative for active hemorrhage   - IR, Vascular, MICU, Wound care appreciated   - Plastic surgery eval appreciated; F/u recs --> wound is not yet amenable to reconstitution. Outpatient PSx follow up upon DC   - Seen by IR 4/16, no drainage offered, recommended for conservative management with wound care for L groin hematoma   - S/P OR with Vascular for wash out on 4/19. wound vac removed, drains removed   - Lovenox PPX dose resumed.   - S/P wound vac placement on Friday 4/26 by PT wound team    Skin infection   - Surgical swab w. + E. coli, E. 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 Otherwise, there was no trauma to the area nor falls. The pain is described as throbbing and does not radiate. He did not try any supportive measures and came to the hospital directly. Of note, the patient was on eliquis when he was found to have bilateral DVTs, however this was changed to lovenox when he had a CVA while on eliquis.    On admission, the patient's vitals were noted to be within normal limits, however he then became hypotensive for which he received 2u pRBCs and protamine sulfate. His labs were significant for anemia and leukocytosis and a CTA showed large hematoma in the LLE and small hematoma of the RLE. He is now admitted to the MICU for further management.    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 On admission, vitals significant for hypotension and labs showing anemia with CTA showing large hematoma of the LLE and small RLE hematoma with repeat CTA showing interval increase of the hematoma and new small LLE hematoma s/p 2units PRBCs. He was  admitted to the MICU for further management.     # LE Hematoma, Anemia   - 4/4 CT A w/ Mod- large SubC hematoma in the anteromedial L upper thigh containing scattered foci of high attenuation concerning for active hemorrhage within the hematoma; Small collection in the anterior upper right thigh suspicious for a hematoma. Mild subcutaneous inflammatory change in the visualized anterior and lateral upper right thigh with associated foci of subcutaneous gas.   - 4/5 CT w/ Interval increase in size of an anteromedial L upper thigh subc  hematoma with scattered foci of high attenuation within the hematoma compatible with active hemorrhage; New left gracilis intramuscular hematoma which could be extending from subcutaneous hematoma.  - Duplex negative for DVT   - S/P Hematoma evacuation with Vascular on 4/5. Drains DC'd as per Vascular   - Pt with episode of worsening LLE edema 4/13, drop in Hgb. ASA and Lovenox held. S/P 2 units of PRBCs   - R/O Compartment syndrome. CT LE w/ evolving hematoma of medial L thigh, decreased in side, negative for active hemorrhage   - IR, Vascular, MICU, Wound care appreciated   - Plastic surgery eval appreciated; F/u recs --> wound is not yet amenable to reconstitution. Outpatient PSx follow up upon DC   - Seen by IR 4/16, no drainage offered, recommended for conservative management with wound care for L groin hematoma   - S/P OR with Vascular for wash out on 4/19. wound vac removed, drains removed   - Lovenox PPX dose resumed.   - S/P wound vac placement on Friday 4/26 by PT wound team    Skin infection   - Surgical swab w. + E. coli, E. faecalis Staph aureus, rare Enterococcus avium   - ID eval consulted; completed zosyn on 4/25  Vascular cleraed patient for discharge with out patient f/u in 1-2 weeks. Dispo/disch/med rec [facility]    Important Medication Changes and Reason:  Lovenox 40 sq qd  Active or Pending Issues Requiring Follow-up:   vascular  Advanced Directives:   [ x] Full code  [ ] DNR  [ ] Hospice    Discharge Diagnoses:  LE hematoma  Anemia  Skin infection

## 2024-04-26 NOTE — DISCHARGE NOTE PROVIDER - NSDCCPCAREPLAN_GEN_ALL_CORE_FT
PRINCIPAL DISCHARGE DIAGNOSIS  Diagnosis: Nontraumatic hematoma of skin and subcutaneous tissue  Assessment and Plan of Treatment: LE Hematoma, Anemia   - 4/4 CT A w/ Mod- large SubC hematoma in the anteromedial L upper thigh containing scattered foci of high attenuation concerning for active hemorrhage within the hematoma; Small collection in the anterior upper right thigh suspicious for a hematoma. Mild subcutaneous inflammatory change in the visualized anterior and lateral upper right thigh with associated foci of subcutaneous gas.   - 4/5 CT w/ Interval increase in size of an anteromedial L upper thigh subc  hematoma with scattered foci of high attenuation within the hematoma compatible with active hemorrhage; New left gracilis intramuscular hematoma which could be extending from subcutaneous hematoma.  - Duplex negative for DVT   - S/P Hematoma evacuation with Vascular on 4/5. Drains DC'd as per Vascular   - Pt with episode of worsening LLE edema 4/13, drop in Hgb. ASA and Lovenox held. S/P 2 units of PRBCs   - R/O Compartment syndrome. CT LE w/ evolving hematoma of medial L thigh, decreased in side, negative for active hemorrhage   - IR, Vascular, MICU, Wound care appreciated   - Plastic surgery eval appreciated; F/u recs --> wound is not yet amenable to reconstitution. Outpatient PSx follow up upon DC   - Seen by IR 4/16, no drainage offered, recommended for conservative management with wound care for L groin hematoma   - S/P OR with Vascular for wash out on 4/19. wound vac removed, drains removed   - Lovenox PPX dose resumed.   - S/P wound vac placement on Friday 4/26 by PT wound team        SECONDARY DISCHARGE DIAGNOSES  Diagnosis: Anemia due to acute blood loss  Assessment and Plan of Treatment: Received blood transfusions. Now hemoglobin stable.    Diagnosis: Wound infection  Assessment and Plan of Treatment: urgical swab w. + E. coli, E. faecalis Staph aureus, rare Enterococcus avium   - ID eval consulted; completed zosyn on 4/25       PRINCIPAL DISCHARGE DIAGNOSIS  Diagnosis: Nontraumatic hematoma of skin and subcutaneous tissue  Assessment and Plan of Treatment: LE Hematoma, Anemia   - 4/4 CT A w/ Mod- large SubC hematoma in the anteromedial L upper thigh containing scattered foci of high attenuation concerning for active hemorrhage within the hematoma; Small collection in the anterior upper right thigh suspicious for a hematoma. Mild subcutaneous inflammatory change in the visualized anterior and lateral upper right thigh with associated foci of subcutaneous gas.   - 4/5 CT w/ Interval increase in size of an anteromedial L upper thigh subc  hematoma with scattered foci of high attenuation within the hematoma compatible with active hemorrhage; New left gracilis intramuscular hematoma which could be extending from subcutaneous hematoma.  - Duplex negative for DVT   - S/P Hematoma evacuation with Vascular on 4/5. Drains DC'd as per Vascular   - Pt with episode of worsening LLE edema 4/13, drop in Hgb. ASA and Lovenox held. S/P 2 units of PRBCs   - R/O Compartment syndrome. CT LE w/ evolving hematoma of medial L thigh, decreased in side, negative for active hemorrhage   - IR, Vascular, MICU, Wound care appreciated   - Plastic surgery eval appreciated; F/u recs --> wound is not yet amenable to reconstitution. Outpatient PSx follow up upon DC   - Seen by IR 4/16, no drainage offered, recommended for conservative management with wound care for L groin hematoma   - S/P OR with Vascular for wash out on 4/19. wound vac removed, drains removed   - Lovenox PPX dose resumed.   - S/P wound vac placement on Friday 4/26 by PT wound team  Follow up with Dr Christie in 1-2 weeks.        SECONDARY DISCHARGE DIAGNOSES  Diagnosis: Anemia due to acute blood loss  Assessment and Plan of Treatment: Received blood transfusions. Now hemoglobin stable.    Diagnosis: Wound infection  Assessment and Plan of Treatment: urgical swab w. + E. coli, E. faecalis Staph aureus, rare Enterococcus avium   - ID eval consulted; completed zosyn on 4/25

## 2024-04-26 NOTE — CHART NOTE - NSCHARTNOTEFT_GEN_A_CORE
Patient is clear for discharge tomorrow from vascular standpoint. Patient should be discharged with wound vac on LLE and can follow up outpatient with Dr. Christie within the next 1-2 weeks.

## 2024-04-26 NOTE — DISCHARGE NOTE PROVIDER - NSDCMRMEDTOKEN_GEN_ALL_CORE_FT
aspirin 81 mg oral delayed release tablet: 1 tab(s) orally once a day  atorvastatin 20 mg oral tablet: 1 tab(s) orally once a day  Calcium tablet: 1 tablet orally once a day  gabapentin 300 mg oral tablet: orally every 8 hours  lacosamide 150 mg oral tablet: 1 tab(s) orally 2 times a day  Lovenox 150 mg/mL injectable solution: 150 milligram(s) subcutaneously  Multiple Vitamins oral tablet: 1 tab(s) orally once a day  tamsulosin 0.4 mg oral capsule: 1 cap(s) orally once a day  Vyvanse 50 mg oral capsule: 1 cap(s) orally once a day (in the morning)   aspirin 81 mg oral delayed release tablet: 1 tab(s) orally once a day  atorvastatin 20 mg oral tablet: 1 tab(s) orally once a day  Calcium tablet: 1 tablet orally once a day  gabapentin 300 mg oral tablet: orally every 8 hours  HYDROmorphone 2 mg oral tablet: 1 tab(s) orally every 6 hours As needed Severe Pain (7 - 10)  lacosamide 150 mg oral tablet: 1 tab(s) orally 2 times a day  Lovenox 150 mg/mL injectable solution: 150 milligram(s) subcutaneously  meclizine 12.5 mg oral tablet: 1 tab(s) orally 3 times a day As needed Dizziness  Multiple Vitamins oral tablet: 1 tab(s) orally once a day  polyethylene glycol 3350 oral powder for reconstitution: 17 gram(s) orally once a day  senna leaf extract oral tablet: 2 tab(s) orally once a day (at bedtime)  tamsulosin 0.4 mg oral capsule: 1 cap(s) orally once a day  Vyvanse 50 mg oral capsule: 1 cap(s) orally once a day (in the morning)   aspirin 81 mg oral delayed release tablet: 1 tab(s) orally once a day  atorvastatin 20 mg oral tablet: 1 tab(s) orally once a day  Calcium tablet: 1 tablet orally once a day  gabapentin 300 mg oral tablet: orally every 8 hours  HYDROmorphone 2 mg oral tablet: 1 tab(s) orally every 6 hours As needed Severe Pain (7 - 10)  lacosamide 150 mg oral tablet: 1 tab(s) orally 2 times a day  Lovenox 40 mg/0.4 mL injectable solution: 40 milligram(s) subcutaneously once a day  meclizine 12.5 mg oral tablet: 1 tab(s) orally 3 times a day As needed Dizziness  Multiple Vitamins oral tablet: 1 tab(s) orally once a day  polyethylene glycol 3350 oral powder for reconstitution: 17 gram(s) orally once a day  senna leaf extract oral tablet: 2 tab(s) orally once a day (at bedtime)  tamsulosin 0.4 mg oral capsule: 1 cap(s) orally once a day  Vyvanse 50 mg oral capsule: 1 cap(s) orally once a day (in the morning)

## 2024-04-27 ENCOUNTER — INPATIENT (INPATIENT)
Facility: HOSPITAL | Age: 54
LOS: 12 days | Discharge: HOME | DRG: 948 | End: 2024-05-10
Attending: PHYSICAL MEDICINE & REHABILITATION | Admitting: PHYSICAL MEDICINE & REHABILITATION
Payer: MEDICAID

## 2024-04-27 ENCOUNTER — TRANSCRIPTION ENCOUNTER (OUTPATIENT)
Age: 54
End: 2024-04-27

## 2024-04-27 VITALS
WEIGHT: 199.08 LBS | SYSTOLIC BLOOD PRESSURE: 108 MMHG | HEART RATE: 72 BPM | TEMPERATURE: 98 F | HEIGHT: 75 IN | OXYGEN SATURATION: 96 % | DIASTOLIC BLOOD PRESSURE: 74 MMHG | RESPIRATION RATE: 15 BRPM

## 2024-04-27 VITALS
DIASTOLIC BLOOD PRESSURE: 67 MMHG | SYSTOLIC BLOOD PRESSURE: 103 MMHG | HEART RATE: 56 BPM | OXYGEN SATURATION: 97 % | RESPIRATION RATE: 18 BRPM | TEMPERATURE: 98 F

## 2024-04-27 DIAGNOSIS — Z98.890 OTHER SPECIFIED POSTPROCEDURAL STATES: Chronic | ICD-10-CM

## 2024-04-27 DIAGNOSIS — R53.81 OTHER MALAISE: ICD-10-CM

## 2024-04-27 LAB
FLUAV AG NPH QL: SIGNIFICANT CHANGE UP
FLUBV AG NPH QL: SIGNIFICANT CHANGE UP
RSV RNA NPH QL NAA+NON-PROBE: SIGNIFICANT CHANGE UP
SARS-COV-2 RNA SPEC QL NAA+PROBE: SIGNIFICANT CHANGE UP

## 2024-04-27 PROCEDURE — 36415 COLL VENOUS BLD VENIPUNCTURE: CPT

## 2024-04-27 PROCEDURE — 85014 HEMATOCRIT: CPT

## 2024-04-27 PROCEDURE — 82803 BLOOD GASES ANY COMBINATION: CPT

## 2024-04-27 PROCEDURE — 80048 BASIC METABOLIC PNL TOTAL CA: CPT

## 2024-04-27 PROCEDURE — 82947 ASSAY GLUCOSE BLOOD QUANT: CPT

## 2024-04-27 PROCEDURE — P9016: CPT

## 2024-04-27 PROCEDURE — 93970 EXTREMITY STUDY: CPT

## 2024-04-27 PROCEDURE — 87116 MYCOBACTERIA CULTURE: CPT

## 2024-04-27 PROCEDURE — 97116 GAIT TRAINING THERAPY: CPT

## 2024-04-27 PROCEDURE — 97530 THERAPEUTIC ACTIVITIES: CPT

## 2024-04-27 PROCEDURE — 36430 TRANSFUSION BLD/BLD COMPNT: CPT

## 2024-04-27 PROCEDURE — 84295 ASSAY OF SERUM SODIUM: CPT

## 2024-04-27 PROCEDURE — 86901 BLOOD TYPING SEROLOGIC RH(D): CPT

## 2024-04-27 PROCEDURE — 82330 ASSAY OF CALCIUM: CPT

## 2024-04-27 PROCEDURE — 97605 NEG PRS WND THER DME<=50SQCM: CPT

## 2024-04-27 PROCEDURE — 96375 TX/PRO/DX INJ NEW DRUG ADDON: CPT

## 2024-04-27 PROCEDURE — C1889: CPT

## 2024-04-27 PROCEDURE — P9040: CPT

## 2024-04-27 PROCEDURE — 87075 CULTR BACTERIA EXCEPT BLOOD: CPT

## 2024-04-27 PROCEDURE — 83735 ASSAY OF MAGNESIUM: CPT

## 2024-04-27 PROCEDURE — 97535 SELF CARE MNGMENT TRAINING: CPT

## 2024-04-27 PROCEDURE — C9399: CPT

## 2024-04-27 PROCEDURE — 82550 ASSAY OF CK (CPK): CPT

## 2024-04-27 PROCEDURE — 86923 COMPATIBILITY TEST ELECTRIC: CPT

## 2024-04-27 PROCEDURE — 97110 THERAPEUTIC EXERCISES: CPT

## 2024-04-27 PROCEDURE — 84132 ASSAY OF SERUM POTASSIUM: CPT

## 2024-04-27 PROCEDURE — 85027 COMPLETE CBC AUTOMATED: CPT

## 2024-04-27 PROCEDURE — 73701 CT LOWER EXTREMITY W/DYE: CPT | Mod: MC

## 2024-04-27 PROCEDURE — 86900 BLOOD TYPING SEROLOGIC ABO: CPT

## 2024-04-27 PROCEDURE — 96374 THER/PROPH/DIAG INJ IV PUSH: CPT

## 2024-04-27 PROCEDURE — 74177 CT ABD & PELVIS W/CONTRAST: CPT | Mod: MC

## 2024-04-27 PROCEDURE — 87186 SC STD MICRODIL/AGAR DIL: CPT

## 2024-04-27 PROCEDURE — 85396 CLOTTING ASSAY WHOLE BLOOD: CPT

## 2024-04-27 PROCEDURE — 85610 PROTHROMBIN TIME: CPT

## 2024-04-27 PROCEDURE — 97164 PT RE-EVAL EST PLAN CARE: CPT

## 2024-04-27 PROCEDURE — 85730 THROMBOPLASTIN TIME PARTIAL: CPT

## 2024-04-27 PROCEDURE — 84100 ASSAY OF PHOSPHORUS: CPT

## 2024-04-27 PROCEDURE — 82435 ASSAY OF BLOOD CHLORIDE: CPT

## 2024-04-27 PROCEDURE — 85018 HEMOGLOBIN: CPT

## 2024-04-27 PROCEDURE — 97760 ORTHOTIC MGMT&TRAING 1ST ENC: CPT

## 2024-04-27 PROCEDURE — 97166 OT EVAL MOD COMPLEX 45 MIN: CPT

## 2024-04-27 PROCEDURE — 73706 CT ANGIO LWR EXTR W/O&W/DYE: CPT | Mod: MC

## 2024-04-27 PROCEDURE — 97162 PT EVAL MOD COMPLEX 30 MIN: CPT

## 2024-04-27 PROCEDURE — 80053 COMPREHEN METABOLIC PANEL: CPT

## 2024-04-27 PROCEDURE — 87070 CULTURE OTHR SPECIMN AEROBIC: CPT

## 2024-04-27 PROCEDURE — 83605 ASSAY OF LACTIC ACID: CPT

## 2024-04-27 PROCEDURE — 86850 RBC ANTIBODY SCREEN: CPT

## 2024-04-27 PROCEDURE — 87077 CULTURE AEROBIC IDENTIFY: CPT

## 2024-04-27 PROCEDURE — 99285 EMERGENCY DEPT VISIT HI MDM: CPT | Mod: 25

## 2024-04-27 PROCEDURE — 87206 SMEAR FLUORESCENT/ACID STAI: CPT

## 2024-04-27 PROCEDURE — 85025 COMPLETE CBC W/AUTO DIFF WBC: CPT

## 2024-04-27 PROCEDURE — 87102 FUNGUS ISOLATION CULTURE: CPT

## 2024-04-27 RX ORDER — OXYCODONE HYDROCHLORIDE 5 MG/1
5 TABLET ORAL EVERY 6 HOURS
Refills: 0 | Status: DISCONTINUED | OUTPATIENT
Start: 2024-04-27 | End: 2024-05-02

## 2024-04-27 RX ORDER — ATORVASTATIN CALCIUM 80 MG/1
20 TABLET, FILM COATED ORAL AT BEDTIME
Refills: 0 | Status: DISCONTINUED | OUTPATIENT
Start: 2024-04-27 | End: 2024-05-10

## 2024-04-27 RX ORDER — TAMSULOSIN HYDROCHLORIDE 0.4 MG/1
0.4 CAPSULE ORAL AT BEDTIME
Refills: 0 | Status: DISCONTINUED | OUTPATIENT
Start: 2024-04-27 | End: 2024-05-10

## 2024-04-27 RX ORDER — ACETAMINOPHEN 500 MG
650 TABLET ORAL EVERY 6 HOURS
Refills: 0 | Status: DISCONTINUED | OUTPATIENT
Start: 2024-04-27 | End: 2024-05-02

## 2024-04-27 RX ORDER — MECLIZINE HCL 12.5 MG
12.5 TABLET ORAL THREE TIMES A DAY
Refills: 0 | Status: DISCONTINUED | OUTPATIENT
Start: 2024-04-27 | End: 2024-05-10

## 2024-04-27 RX ORDER — GABAPENTIN 400 MG/1
300 CAPSULE ORAL THREE TIMES A DAY
Refills: 0 | Status: DISCONTINUED | OUTPATIENT
Start: 2024-04-27 | End: 2024-05-10

## 2024-04-27 RX ORDER — OXYCODONE HYDROCHLORIDE 5 MG/1
10 TABLET ORAL EVERY 6 HOURS
Refills: 0 | Status: DISCONTINUED | OUTPATIENT
Start: 2024-04-27 | End: 2024-05-02

## 2024-04-27 RX ORDER — ENOXAPARIN SODIUM 100 MG/ML
40 INJECTION SUBCUTANEOUS EVERY 24 HOURS
Refills: 0 | Status: DISCONTINUED | OUTPATIENT
Start: 2024-04-27 | End: 2024-05-10

## 2024-04-27 RX ORDER — POLYETHYLENE GLYCOL 3350 17 G/17G
17 POWDER, FOR SOLUTION ORAL DAILY
Refills: 0 | Status: DISCONTINUED | OUTPATIENT
Start: 2024-04-27 | End: 2024-05-10

## 2024-04-27 RX ORDER — SENNA PLUS 8.6 MG/1
2 TABLET ORAL AT BEDTIME
Refills: 0 | Status: DISCONTINUED | OUTPATIENT
Start: 2024-04-27 | End: 2024-05-10

## 2024-04-27 RX ORDER — LACOSAMIDE 50 MG/1
150 TABLET ORAL
Refills: 0 | Status: DISCONTINUED | OUTPATIENT
Start: 2024-04-27 | End: 2024-05-10

## 2024-04-27 RX ORDER — ASPIRIN/CALCIUM CARB/MAGNESIUM 324 MG
81 TABLET ORAL DAILY
Refills: 0 | Status: DISCONTINUED | OUTPATIENT
Start: 2024-04-27 | End: 2024-05-10

## 2024-04-27 RX ORDER — LANOLIN ALCOHOL/MO/W.PET/CERES
3 CREAM (GRAM) TOPICAL AT BEDTIME
Refills: 0 | Status: DISCONTINUED | OUTPATIENT
Start: 2024-04-27 | End: 2024-05-10

## 2024-04-27 RX ORDER — ENOXAPARIN SODIUM 100 MG/ML
150 INJECTION SUBCUTANEOUS
Refills: 0 | DISCHARGE

## 2024-04-27 RX ADMIN — CHLORHEXIDINE GLUCONATE 1 APPLICATION(S): 213 SOLUTION TOPICAL at 11:29

## 2024-04-27 RX ADMIN — Medication 1 TABLET(S): at 11:26

## 2024-04-27 RX ADMIN — Medication 650 MILLIGRAM(S): at 01:10

## 2024-04-27 RX ADMIN — HYDROMORPHONE HYDROCHLORIDE 2 MILLIGRAM(S): 2 INJECTION INTRAMUSCULAR; INTRAVENOUS; SUBCUTANEOUS at 12:42

## 2024-04-27 RX ADMIN — Medication 650 MILLIGRAM(S): at 02:00

## 2024-04-27 RX ADMIN — HYDROMORPHONE HYDROCHLORIDE 2 MILLIGRAM(S): 2 INJECTION INTRAMUSCULAR; INTRAVENOUS; SUBCUTANEOUS at 05:06

## 2024-04-27 RX ADMIN — LACOSAMIDE 150 MILLIGRAM(S): 50 TABLET ORAL at 17:53

## 2024-04-27 RX ADMIN — GABAPENTIN 300 MILLIGRAM(S): 400 CAPSULE ORAL at 05:02

## 2024-04-27 RX ADMIN — POLYETHYLENE GLYCOL 3350 17 GRAM(S): 17 POWDER, FOR SOLUTION ORAL at 11:26

## 2024-04-27 RX ADMIN — Medication 81 MILLIGRAM(S): at 11:26

## 2024-04-27 RX ADMIN — LACOSAMIDE 150 MILLIGRAM(S): 50 TABLET ORAL at 05:02

## 2024-04-27 RX ADMIN — ENOXAPARIN SODIUM 40 MILLIGRAM(S): 100 INJECTION SUBCUTANEOUS at 21:54

## 2024-04-27 RX ADMIN — Medication 1 APPLICATION(S): at 11:27

## 2024-04-27 RX ADMIN — HYDROMORPHONE HYDROCHLORIDE 2 MILLIGRAM(S): 2 INJECTION INTRAMUSCULAR; INTRAVENOUS; SUBCUTANEOUS at 11:26

## 2024-04-27 RX ADMIN — OXYCODONE HYDROCHLORIDE 10 MILLIGRAM(S): 5 TABLET ORAL at 18:50

## 2024-04-27 RX ADMIN — HYDROMORPHONE HYDROCHLORIDE 2 MILLIGRAM(S): 2 INJECTION INTRAMUSCULAR; INTRAVENOUS; SUBCUTANEOUS at 06:00

## 2024-04-27 RX ADMIN — GABAPENTIN 300 MILLIGRAM(S): 400 CAPSULE ORAL at 21:54

## 2024-04-27 RX ADMIN — OXYCODONE HYDROCHLORIDE 10 MILLIGRAM(S): 5 TABLET ORAL at 17:53

## 2024-04-27 RX ADMIN — ATORVASTATIN CALCIUM 20 MILLIGRAM(S): 80 TABLET, FILM COATED ORAL at 21:54

## 2024-04-27 RX ADMIN — TAMSULOSIN HYDROCHLORIDE 0.4 MILLIGRAM(S): 0.4 CAPSULE ORAL at 21:54

## 2024-04-27 RX ADMIN — ENOXAPARIN SODIUM 40 MILLIGRAM(S): 100 INJECTION SUBCUTANEOUS at 11:32

## 2024-04-27 RX ADMIN — Medication 3 MILLIGRAM(S): at 21:54

## 2024-04-27 NOTE — PROGRESS NOTE ADULT - SUBJECTIVE AND OBJECTIVE BOX
SUBJECTIVE/ OVERNIGHT EVENTS:  --- Coverage for Dr. Diez ---   wound vac is in place.  feeling well  going to rehab today.  given DVT hx, will be continuing Lovenox 40 mg qdaily dosing at the rehab for prophylaxis.  tolerating here.  no cp, no sob, no n/v/d. no abdominal pain.  no headache, no dizziness.   denied pain.         --------------------------------------------------------------------------------------------  LABS:            CAPILLARY BLOOD GLUCOSE                RADIOLOGY & ADDITIONAL TESTS:    Imaging Personally Reviewed:  [x] YES  [ ] NO    Consultant(s) Notes Reviewed:  [x] YES  [ ] NO    MEDICATIONS  (STANDING):  aspirin  chewable 81 milliGRAM(s) Oral daily  atorvastatin 20 milliGRAM(s) Oral at bedtime  chlorhexidine 2% Cloths 1 Application(s) Topical daily  Dakins Solution - 1/4 Strength 1 Application(s) Topical daily  enoxaparin Injectable 40 milliGRAM(s) SubCutaneous every 24 hours  gabapentin 300 milliGRAM(s) Oral every 8 hours  lacosamide 150 milliGRAM(s) Oral two times a day  multivitamin 1 Tablet(s) Oral daily  polyethylene glycol 3350 17 Gram(s) Oral daily  senna 2 Tablet(s) Oral at bedtime  tamsulosin 0.4 milliGRAM(s) Oral at bedtime    MEDICATIONS  (PRN):  bacitracin   Ointment 1 Application(s) Topical daily PRN daily dressing change  HYDROmorphone   Tablet 2 milliGRAM(s) Oral every 6 hours PRN Severe Pain (7 - 10)  meclizine 12.5 milliGRAM(s) Oral three times a day PRN Dizziness      Care Discussed with Consultants/Other Providers [x] YES  [ ] NO    Vital Signs Last 24 Hrs  T(C): 36.6 (27 Apr 2024 08:16), Max: 36.9 (26 Apr 2024 10:21)  T(F): 97.9 (27 Apr 2024 08:16), Max: 98.5 (26 Apr 2024 23:20)  HR: 56 (27 Apr 2024 08:16) (56 - 62)  BP: 103/67 (27 Apr 2024 08:16) (95/82 - 122/78)  BP(mean): --  RR: 18 (27 Apr 2024 08:16) (18 - 18)  SpO2: 97% (27 Apr 2024 08:16) (96% - 99%)    Parameters below as of 27 Apr 2024 08:16  Patient On (Oxygen Delivery Method): room air      I&O's Summary    26 Apr 2024 07:01  -  27 Apr 2024 07:00  --------------------------------------------------------  IN: 0 mL / OUT: 800 mL / NET: -800 mL      PHYSICAL EXAM:  GENERAL: NAD, well-developed, comfortable  HEAD:  Atraumatic, Normocephalic  EYES: EOMI, PERRLA, conjunctiva and sclera clear  NECK: Supple, No JVD  CHEST/LUNG: Clear to auscultation bilaterally; No wheeze  HEART: Regular rate and rhythm; No murmurs, rubs, or gallops  ABDOMEN: Soft, Nontender, Nondistended; Bowel sounds present  Neuro: AAOx3, no focal weakness, +mild expressive aphasia, 5/5 b/l extremity strength  EXTREMITIES:  2+ Peripheral Pulses, No clubbing, cyanosis, or edema. + left leg inner thigh wound vac in place.   SKIN: No rashes or lesions

## 2024-04-27 NOTE — H&P ADULT - NSHPREVIEWOFSYSTEMS_GEN_ALL_CORE
REVIEW OF SYSTEMS  Constitutional: No fever, No Chills, No fatigue  HEENT: No eye pain, No visual disturbances, No difficulty hearing  Pulm: No cough,  No shortness of breath  Cardio: No chest pain, No palpitations  GI:  No abdominal pain, No nausea, No vomiting, No diarrhea, No constipation  : No dysuria, No frequency, No hematuria  Neuro: No headaches, No memory loss, + loss of strength,  + numbness, No tremors  Skin: No itching, No rashes, No lesions   Endo: No temperature intolerance  MSK: No joint pain, No joint swelling, + muscle pain, No Neck or back pain  Psych:  No depression, No anxiety

## 2024-04-27 NOTE — PROGRESS NOTE ADULT - PROVIDER SPECIALTY LIST ADULT
Heme/Onc
Heme/Onc
Internal Medicine
MICU
Neurology
Rehab Medicine
Vascular Surgery
Heme/Onc
Internal Medicine
MICU
Neurology
Vascular Surgery
Heme/Onc
Heme/Onc
Internal Medicine
Neurology
Neurology
Orthopedics
Rehab Medicine
Vascular Surgery
Heme/Onc
Infectious Disease
Internal Medicine
Neurology
Vascular Surgery
Internal Medicine
Internal Medicine
Heme/Onc
Neurology
MICU

## 2024-04-27 NOTE — H&P ADULT - NSHPPHYSICALEXAM_GEN_ALL_CORE
Wound Measures 10.5cm x4.0cm x0.75cm Gen - NAD, Comfortable  HEENT - NCAT, EOMI, MMM  Neck - Supple, No limited ROM  Pulm - CTAB, No wheeze, No rhonchi, No crackles  Cardiovascular - RRR, S1S2, No murmurs  Abdomen - Soft, NT/ND, +BS  Extremities - No C/C/E, No calf tenderness  Neuro-     Cognitive - AAOx3     Communication - Fluent, No dysarthria     Attention: Intact      Judgement: Good evidence of judgement     Motor -                     LEFT    UE - ShAB 5/5, EF 5/5, EE 5/5, WE 5/5,  5/5                    RIGHT UE - ShAB 5/5, EF 5/5, EE 5/5, WE 5/5,  5/5                    LEFT    LE - HF 5/5, KE 5/5, DF 5/5, PF 5/5                    RIGHT LE - HF 4/5, KE 4/5, DF 3/5, PF 4/5        Sensory - Intact to LT     Reflexes - DTR Intact, No primitive reflexive     Coordination - FTN intact     Tone - normal  Psychiatric - Mood stable, Affect WNL  Skin:  Wound Measures 10.5cm x4.0cm x1.4cm L thigh, old fasciotomy site present, Stage 1 nonblanchable on the right ankle Gen - NAD, Comfortable  HEENT - NCAT, EOMI, MMM  Neck - Supple, No limited ROM  Pulm - CTAB, No wheeze, No rhonchi, No crackles  Cardiovascular - RRR, S1S2, No murmurs  Abdomen - Soft, NT/ND, +BS  Extremities - No C/C/E, No calf tenderness  Neuro-     Cognitive - AAOx3     Communication - Fluent, No dysarthria     Attention: Intact      Judgement: Good evidence of judgement     Motor -                     LEFT    UE - ShAB 5/5, EF 5/5, EE 5/5, WE 5/5,  5/5                    RIGHT UE - ShAB 5/5, EF 5/5, EE 5/5, WE 5/5,  5/5                    LEFT    LE - HF 4/5, KE 4/5, DF 3/5, PF 4/5                    RIGHT LE - HF 4/5, KE 4/5, DF 4/5, PF 4/5        Sensory - Intact to LT     Reflexes - DTR Intact, No primitive reflexive     Coordination - FTN intact     Tone - normal  Psychiatric - Mood stable, Affect WNL  Skin:  Wound Measures 10.5cm x4.0cm x1.4cm L thigh, old fasciotomy site present, Stage 1 nonblanchable on the right ankle

## 2024-04-27 NOTE — DISCHARGE NOTE NURSING/CASE MANAGEMENT/SOCIAL WORK - NSDCPEFALRISK_GEN_ALL_CORE
For information on Fall & Injury Prevention, visit: [email or web address] OR  [email or web address] OR  https://www.cdc.gov/steadi/patient.html

## 2024-04-27 NOTE — H&P ADULT - NSHPLABSRESULTS_GEN_ALL_CORE
CTA LLE 4/13  IMPRESSION:  Evolving hematoma in the medial left thigh is substantially decreased in   size compared with CT 4/5/2024 and without evidence for active hemorrhage.      Duplex BLE on 4/6:  negative for b/l DVTs    Complete Blood Count (04.25.24 @ 06:48)   Nucleated RBC: 0 /100 WBCs  WBC Count: 4.92 K/uL  RBC Count: 3.62 M/uL  Hemoglobin: 10.3 g/dL  Hematocrit: 31.9 %  Mean Cell Volume: 88.1 fl  Mean Cell Hemoglobin: 28.5 pg  Mean Cell Hemoglobin Conc: 32.3 gm/dL  Red Cell Distrib Width: 13.8 %  Platelet Count - Automated: 357 K/uL

## 2024-04-27 NOTE — PATIENT PROFILE ADULT - FALL HARM RISK - RISK INTERVENTIONS
Assistance OOB with selected safe patient handling equipment/Assistance with ambulation/Communicate Fall Risk and Risk Factors to all staff, patient, and family/Discuss with provider need for PT consult/Monitor gait and stability/Reinforce activity limits and safety measures with patient and family/Visual Cue: Yellow wristband/Bed in lowest position, wheels locked, appropriate side rails in place/Call bell, personal items and telephone in reach/Instruct patient to call for assistance before getting out of bed or chair/Non-slip footwear when patient is out of bed/[place] to call system/Physically safe environment - no spills, clutter or unnecessary equipment/Purposeful Proactive Rounding/Room/bathroom lighting operational, light cord in reach

## 2024-04-27 NOTE — H&P ADULT - ATTENDING COMMENTS
CARMENALEXANDREATAMI PITTMAN is a 53y with hx of CVA w/ residual expressive aphasia, 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  He reports he is doing well today.  Alert and oriented x3.  Some difficulty with expressive speech.  5/5 in B/L UE.    #Impaired mobility/Hematoma s/p evacuation: Consult PT/OT for evaluation and treatment  #HLD: Atorvastatin  #Seizure Prophylaxis: Lacosamide   #DVT Prophylaxis: Lovenox   Appreciate internal medicine consultation,  Plan of care as above.

## 2024-04-27 NOTE — H&P ADULT - NSHPSOCIALHISTORY_GEN_ALL_CORE
Smoking - Denied  EtOH - Denied   Drugs - Denied     Lives alone, apt, 8 steps to enter   Independent ADLs, uses walker     CURRENT FUNCTIONAL STATUS  Bed Mobility: CG  Transfers: CG  Gait: CG

## 2024-04-27 NOTE — PROGRESS NOTE ADULT - ASSESSMENT
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 On admission, vitals significant for hypotension and labs showing anemia with CTA showing large hematoma of the LLE and small RLE hematoma with repeat CTA showing interval increase of the hematoma and new small LLE hematoma s/p 2units pRBCs. He is now admitted to the MICU for further management.     # LE Hematoma, Anemia   - 4/4 CT A w/ Mod- large SubC hematoma in the anteromedial L upper thigh containing scattered foci of high attenuation concerning for active hemorrhage within the hematoma; Small collection in the anterior upper right thigh suspicious for a hematoma. Mild subcutaneous inflammatory change in the visualized anterior and lateral upper right thigh with associated foci of subcutaneous gas.   - 4/5 CT w/ Interval increase in size of an anteromedial L upper thigh subc  hematoma with scattered foci of high attenuation within the hematoma compatible with active hemorrhage; New left gracilis intramuscular hematoma which could be extending from subcutaneous hematoma.  - Duplex negative for DVT   - S/P Hematoma evacuation with Vascular on 4/5. Drains DC'd as per Vascular   - Pt with episode of worsening LLE edema 4/13, drop in Hgb. ASA and Lovenox held. S/P 2 units of PRBCs   - R/O Compartment syndrome. CT LE w/ evolving hematoma of medial L thigh, decreased in side, negative for active hemorrhage   - Pulse checks per protocol;  Monitor LE closely  - IR, Vascular, MICU, Wound care appreciated   - Plastic surgery eval appreciated; F/u recs --> wound is not yet amenable to reconstitution. Outpatient PSx follow up upon DC   - Seen by IR 4/16, no drainage offered, recommended for conservative management with wound care for L groin hematoma   - S/P OR with Vascular for wash out on 4/19. wound vac removed, drains removed   - Lovenox PPX dose resumed. Monitor H/H   - wound vac placement on 4/26/24. doing well. continue care as per vascular/ wound vac team.     Skin infection   - Surgical swab w. + E. coli, E. faecalis Staph aureus, rare Enterococcus avium   - on Zosyn for ABX  - ID eval consulted; abx per ID (completed abx 4/25)     #Leukocytosis  - Noted to have elevated WBC   - Trend WBC --> Down-trending     #Seizure disorder  # CVA with residual expressive aphasia  - C/w home AEDs (lacosamide and vyanese)  - C/w home gabapentin   - Seizure precautions  - Neuro eval appreciated; F/u recs     #HLD  - C/w home Lipitor    #BPH  - C/w flomax    #DVTs  - hx of bilateral DVTs --> Repeat duplex negative     PPX  - Lovenox

## 2024-04-27 NOTE — H&P ADULT - ASSESSMENT
Assessment/Plan:  TAMI DUNHAM is a 53y with ****.  Hospital Course complicated by ***. Patient now admitted for a multidisciplinary rehab program. 04-27-24 @ 12:55        Comprehensive Multidisciplinary Rehab Program:  - Start comprehensive rehab program of PT/OT/SLP - 3 hours a day, 5 days a week. P&O as needed       HTN  -   - Monitor BP    HLD  - cont statin    T2DM  - Lantus  - Premeal  - SSI  - Monitor fingersticks    Pain  - Tylenol PRN  - Avoid sedating medications that may interfere with cognitive recovery    Mood / Cognition  - Neuropsychology consult  - [ ] Enhanced Supervision  [ ] Constant Observation    Sleep  - Maintain quiet hours and a low stim environment.   - Monitor sleep logs (for BIU)  - Melatonin PRN     GI / Bowel  - Senna qHS  - Miralax PRN Daily  - GI ppx: ***     / Bladder  - Currently patient voids:      [ ] independent      [ ] external collection device (condom cath)      [ ] Indwelling fish catheter      [ ] Intermittent catheterization  - Continue bladder scans Q8 hours with straight cath for >400cc.  - Toileting schedule every 4 hours    Skin / Pressure injury  - Skin assessment on admission performed [  ] :   - Monitor Incisions:    - Turn q2 hours in bed while awake, air mattress  - nursing to monitor skin qShift  - soft heel protectors  - skin barrier cream PRN    Diet/Dysphagia:  - Diet Consistency: ***  - Supplements: ***  - Aspiration Precautions  - SLP consult for swallow function evaluation and treatment  - Nutrition consult    DVT prophylaxis:   - *****  - SCDs  - Last Doppler on ***       Outpatient Follow-up:  ** Specialty and Name of physician      Code Status/Emergency Contact:      ---------------    Goals: Safe discharge to home  Estimated Length of Stay: 10-14 days  Rehab Potential: Good  Medical Prognosis: Good  Estimated Disposition: Home with home care      PRESCREEN COMPARISON:  I have reviewed the prescreen information and I have found no relevant changes between the preadmission screening and my post admission evaluation.    RATIONALE FOR INPATIENT ADMISSION: Patient demonstrates the following:  [X] Medically appropriate for rehabilitation admission  [X] Has attainable rehab goals with an appropriate initial discharge plan  [X]Has rehabilitation potential (expected to make a significant improvement within a reasonable period of time)  [X] Requires close medical management by a rehab physician, rehab nursing care, Hospitalist and comprehensive interdisciplinary team (including PT, OT and/or SLP, Prosthetics and Orthotics)     Assessment/Plan:  TAMI DUNHAM is a 53y with hx of CVA w/ residual expressive aphasia, 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  Patient now admitted for a multidisciplinary rehab program. 04-27-24 @ 12:55    Moderate amount of seroanginous drainage noted. Cleansed with normal saline. Wound appears red and grannulating 100%. Measures 10.5cm x4.0cm x0.75cm. Redressed with adaptic, followed by black foam and placed to continuous suction at 125mmHg with good seal in place. Purposeful rounding reinforced. RN Unchata aware of dressing change. PT WC will continue to follow.     Comprehensive Multidisciplinary Rehab Program:  - Start comprehensive rehab program of PT/OT/SLP - 3 hours a day, 5 days a week. P&O as needed     ]    HTN  -   - Monitor BP    HLD  - cont statin    T2DM  - Lantus  - Premeal  - SSI  - Monitor fingersticks    Pain  - Tylenol PRN  - Avoid sedating medications that may interfere with cognitive recovery    Mood / Cognition  - Neuropsychology consult  - [ ] Enhanced Supervision  [ ] Constant Observation    Sleep  - Maintain quiet hours and a low stim environment.   - Monitor sleep logs (for BIU)  - Melatonin PRN     GI / Bowel  - Senna qHS  - Miralax PRN Daily  - GI ppx: ***     / Bladder  - Currently patient voids:      [ ] independent      [ ] external collection device (condom cath)      [ ] Indwelling fish catheter      [ ] Intermittent catheterization  - Continue bladder scans Q8 hours with straight cath for >400cc.  - Toileting schedule every 4 hours    Skin / Pressure injury  - Skin assessment on admission performed [  ] :   - Monitor Incisions:    - Turn q2 hours in bed while awake, air mattress  - nursing to monitor skin qShift  - soft heel protectors  - skin barrier cream PRN    Diet/Dysphagia:  - Diet Consistency: ***  - Supplements: ***  - Aspiration Precautions  - SLP consult for swallow function evaluation and treatment  - Nutrition consult    DVT prophylaxis:   - *****  - SCDs  - Last Doppler on ***       Outpatient Follow-up:  ** Specialty and Name of physician      Code Status/Emergency Contact:      ---------------    Goals: Safe discharge to home  Estimated Length of Stay: 10-14 days  Rehab Potential: Good  Medical Prognosis: Good  Estimated Disposition: Home with home care      PRESCREEN COMPARISON:  I have reviewed the prescreen information and I have found no relevant changes between the preadmission screening and my post admission evaluation.    RATIONALE FOR INPATIENT ADMISSION: Patient demonstrates the following:  [X] Medically appropriate for rehabilitation admission  [X] Has attainable rehab goals with an appropriate initial discharge plan  [X]Has rehabilitation potential (expected to make a significant improvement within a reasonable period of time)  [X] Requires close medical management by a rehab physician, rehab nursing care, Hospitalist and comprehensive interdisciplinary team (including PT, OT and/or SLP, Prosthetics and Orthotics)     Assessment/Plan:  TAMI DUNHAM is a 53y with hx of CVA w/ residual expressive aphasia, 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  Patient now admitted for a multidisciplinary rehab program. 04-27-24 @ 12:55    Hematoma s/p evacuation  Comprehensive Multidisciplinary Rehab Program:  - Start comprehensive rehab program of PT/OT/SLP - 3 hours a day, 5 days a week. P&O as needed   - s/p hematoma evacuation w/ vascular on 4/5  - plastics consulted for wound eval and recommended outpatient appt  - IR consulted but no intervention recommended  -Vascular w/ washout on 4/19 for skin infection  -wound vac placed on 4/26  -will need wound care consult  -aspirin 81mg  -ok for DVT ppx    Skin Infection  -surgical swab w/ multiorganism infection  -s/p zosyn  -id consulted and recommendation was to complete abx on 4/25    Seizure disorder  -vimpat 150mg BID    ADHD  -vyvanse 50mg daily -> will have to call pharmacy    Vertigo  -meclizine prn     HLD  - cont lipitor 20mg      Pain  - Tylenol PRN and oxycodone prn  - Avoid sedating medications that may interfere with cognitive recovery      Sleep  - Maintain quiet hours and a low stim environment.   - Melatonin PRN     GI / Bowel  - Senna qHS  - Miralax Daily  - GI ppx: none     / Bladder  - Currently patient voids:      [ ] independent      [ ] external collection device (condom cath)      [ ] Indwelling fish catheter      [ ] Intermittent catheterization  - Continue bladder scans Q8 hours with straight cath for >400cc.  - Toileting schedule every 4 hours    Skin / Pressure injury  - Skin assessment on admission performed [  ] :   - Monitor Incisions:    - Turn q2 hours in bed while awake, air mattress  - nursing to monitor skin qShift  - soft heel protectors  - skin barrier cream PRN    Diet/Dysphagia:  - Diet Consistency: regular  - Aspiration Precautions  - SLP consult for swallow function evaluation and treatment  - Nutrition consult    DVT prophylaxis:   - Lovenox  - Last Doppler on 4/6 -> negative for B/L LE DVTs      Outpatient Follow-up:  UROLOGY [address]  Scheduled Appointment: 05/13/2024    Doni Walker Physician [facility]  UROLOGY [address]  Scheduled Appointment: 05/13/2024    Rodolfo Johnson  Internal Medicine  [address] [address]  Phone: [phone]  Fax: [phone]  Follow Up Time:     Mariah Christie  Vascular Surgery  [address] [place] [address], [address]  [place] [address]  Phone: [phone]  Fax: [phone]  Follow Up Time:        Code Status/Emergency Contact:  FULL CODE  Emergency Contact - Winter Gallito - [phone]    ---------------    Goals: Safe discharge to home  Estimated Length of Stay: 10-14 days  Rehab Potential: Good  Medical Prognosis: Good  Estimated Disposition: Home with home care      PRESCREEN COMPARISON:  I have reviewed the prescreen information and I have found no relevant changes between the preadmission screening and my post admission evaluation.    RATIONALE FOR INPATIENT ADMISSION: Patient demonstrates the following:  [X] Medically appropriate for rehabilitation admission  [X] Has attainable rehab goals with an appropriate initial discharge plan  [X]Has rehabilitation potential (expected to make a significant improvement within a reasonable period of time)  [X] Requires close medical management by a rehab physician, rehab nursing care, Hospitalist and comprehensive interdisciplinary team (including PT, OT and/or SLP, Prosthetics and Orthotics)     Assessment/Plan:  TAMI DUNHAM is a 53y with hx of CVA w/ residual expressive aphasia, 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  Patient now admitted for a multidisciplinary rehab program. 04-27-24 @ 12:55    Hematoma s/p evacuation  Comprehensive Multidisciplinary Rehab Program:  - Start comprehensive rehab program of PT/OT/SLP - 3 hours a day, 5 days a week. P&O as needed   - s/p hematoma evacuation w/ vascular on 4/5  - plastics consulted for wound eval and recommended outpatient appt  - IR consulted but no intervention recommended  -Vascular w/ washout on 4/19 for skin infection  -wound vac placed on 4/26  -will need wound care consult  -aspirin 81mg  -ok for DVT ppx    Skin Infection  -surgical swab w/ multiorganism infection  -s/p zosyn  -id consulted and recommendation was to complete abx on 4/25    Seizure disorder  -vimpat 150mg BID    ADHD  -vyvanse 50mg daily -> will have to call pharmacy    Vertigo  -meclizine prn     HLD  - cont lipitor 20mg      Pain  - Tylenol PRN and oxycodone prn  - Avoid sedating medications that may interfere with cognitive recovery      Sleep  - Maintain quiet hours and a low stim environment.   - Melatonin PRN     GI / Bowel  - Senna qHS  - Miralax Daily  - GI ppx: none     / Bladder  - Currently patient voids:      [X] independent      [ ] external collection device (condom cath)      [ ] Indwelling fish catheter      [ ] Intermittent catheterization  - Continue bladder scans once with straight cath for >400cc.    Skin / Pressure injury  - Skin assessment on admission performed [ X ] :   - Monitor Incisions:  wound vac and incision on the left thigh  - Turn q2 hours in bed while awake, air mattress  - nursing to monitor skin qShift  - soft heel protectors  - skin barrier cream PRN    Diet/Dysphagia:  - Diet Consistency: regular  - Aspiration Precautions  - SLP consult for swallow function evaluation and treatment  - Nutrition consult    DVT prophylaxis:   - Lovenox  - Last Doppler on 4/6 -> negative for B/L LE DVTs      Outpatient Follow-up:  UROLOGY [address]  Scheduled Appointment: 05/13/2024    Doni Walker Physician [facility]  UROLOGY [address]  Scheduled Appointment: 05/13/2024    Rodolfo Johnson  Internal Medicine  [address] [address]  Phone: [phone]  Fax: [phone]  Follow Up Time:     Mariah Christie  Vascular Surgery  [address] [place] [address], [address]  [place] [address]  Phone: [phone]  Fax: [phone]  Follow Up Time:        Code Status/Emergency Contact:  FULL CODE  Emergency Contact - Winter Gallito - [phone]    ---------------    Goals: Safe discharge to home  Estimated Length of Stay: 10-14 days  Rehab Potential: Good  Medical Prognosis: Good  Estimated Disposition: Home with home care      PRESCREEN COMPARISON:  I have reviewed the prescreen information and I have found no relevant changes between the preadmission screening and my post admission evaluation.    RATIONALE FOR INPATIENT ADMISSION: Patient demonstrates the following:  [X] Medically appropriate for rehabilitation admission  [X] Has attainable rehab goals with an appropriate initial discharge plan  [X]Has rehabilitation potential (expected to make a significant improvement within a reasonable period of time)  [X] Requires close medical management by a rehab physician, rehab nursing care, Hospitalist and comprehensive interdisciplinary team (including PT, OT and/or SLP, Prosthetics and Orthotics)

## 2024-04-27 NOTE — PROGRESS NOTE ADULT - NSPROGADDITIONALINFOA_GEN_ALL_CORE
d/w pt and NP Erica Fountain.   wound vac placement done Friday.  feeling well, going to rehab today.  given DVT hx, will be continuing Lovenox 40 mg qdaily dosing at the rehab for prophylaxis.  PT: Rehab.     --- Covering for Dr. Diez ---  - Dr. ROY [facility]  - [phone]
d/w pt and NP.    --- Covering for Dr. Diez ---  - Dr. MANUELA Barker  - [phone]
d/w pt and NP Erica Fountain.   wound vac placement on Friday as per vascular/ wound van team.   PT: Rehab planning.     --- Covering for Dr. Diez ---  - Dr. ROY [facility]  - [phone]

## 2024-04-27 NOTE — H&P ADULT - HISTORY OF PRESENT ILLNESS
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 CTA demonstrated a large hematoma in the left thigh w/ a small hematoma of the RLE. Patient s/p hematoma evacuation by vascular on 4/5. plastic surgery consulted and recommending outpatient follow up. IR consulted but no drainage offered. Patient s/p OR w/ vascular on 4/19 for washout after found to have multiorganism skin infection.  Patient had wound vac placed on 4/26 by wound team. Patient completed IV zosyn on 4/25 for skin infection. Patient seen by rehab team and recommended for acute inpatient rehabilitation. Patient discharged to [place] on 4/27 after medically stabilized and optimized.      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 CTA demonstrated a large hematoma in the left thigh w/ a small hematoma of the RLE. Patient s/p hematoma evacuation by vascular on 4/5. plastic surgery consulted and recommending outpatient follow up. IR consulted but no drainage offered. Patient s/p OR w/ vascular on 4/19 for washout after found to have multiorganism skin infection.  Patient had wound vac placed on 4/26 by wound team. Patient completed IV zosyn on 4/25 for skin infection. Patient seen by rehab team and recommended for acute inpatient rehabilitation. Patient discharged to [place] on 4/27 after medically stabilized and optimized.

## 2024-04-27 NOTE — PROGRESS NOTE ADULT - REASON FOR ADMISSION
Hematoma
Left thigh hematoma
Hematoma
Left thigh Hematoma
Hematoma

## 2024-04-27 NOTE — PATIENT PROFILE ADULT - FUNCTIONAL ASSESSMENT - DAILY ACTIVITY 6.
Normal muscle tone/strength
Normal muscle tone/strength
PACU
Normal muscle tone/strength
4 = No assist / stand by assistance

## 2024-04-27 NOTE — DISCHARGE NOTE NURSING/CASE MANAGEMENT/SOCIAL WORK - PATIENT PORTAL LINK FT
You can access the FollowMyHealth Patient Portal offered by [facility] by registering at the following website: [email or web address]. By joining [employer]’s FollowMyHealth portal, you will also be able to view your health information using other applications (apps) compatible with our system. 35.8

## 2024-04-28 LAB
ALBUMIN SERPL ELPH-MCNC: 2.8 G/DL — LOW (ref 3.3–5)
ALP SERPL-CCNC: 80 U/L — SIGNIFICANT CHANGE UP (ref 40–120)
ALT FLD-CCNC: 22 U/L — SIGNIFICANT CHANGE UP (ref 10–45)
ANION GAP SERPL CALC-SCNC: 7 MMOL/L — SIGNIFICANT CHANGE UP (ref 5–17)
AST SERPL-CCNC: 12 U/L — SIGNIFICANT CHANGE UP (ref 10–40)
BASOPHILS # BLD AUTO: 0.09 K/UL — SIGNIFICANT CHANGE UP (ref 0–0.2)
BASOPHILS NFR BLD AUTO: 1.6 % — SIGNIFICANT CHANGE UP (ref 0–2)
BILIRUB SERPL-MCNC: 0.3 MG/DL — SIGNIFICANT CHANGE UP (ref 0.2–1.2)
BUN SERPL-MCNC: 23 MG/DL — SIGNIFICANT CHANGE UP (ref 7–23)
CALCIUM SERPL-MCNC: 8.9 MG/DL — SIGNIFICANT CHANGE UP (ref 8.4–10.5)
CHLORIDE SERPL-SCNC: 107 MMOL/L — SIGNIFICANT CHANGE UP (ref 96–108)
CO2 SERPL-SCNC: 29 MMOL/L — SIGNIFICANT CHANGE UP (ref 22–31)
CREAT SERPL-MCNC: 0.98 MG/DL — SIGNIFICANT CHANGE UP (ref 0.5–1.3)
EGFR: 92 ML/MIN/1.73M2 — SIGNIFICANT CHANGE UP
EOSINOPHIL # BLD AUTO: 0.46 K/UL — SIGNIFICANT CHANGE UP (ref 0–0.5)
EOSINOPHIL NFR BLD AUTO: 8.2 % — HIGH (ref 0–6)
GLUCOSE SERPL-MCNC: 88 MG/DL — SIGNIFICANT CHANGE UP (ref 70–99)
HCT VFR BLD CALC: 35 % — LOW (ref 39–50)
HGB BLD-MCNC: 11 G/DL — LOW (ref 13–17)
IMM GRANULOCYTES NFR BLD AUTO: 1.1 % — HIGH (ref 0–0.9)
LYMPHOCYTES # BLD AUTO: 1.61 K/UL — SIGNIFICANT CHANGE UP (ref 1–3.3)
LYMPHOCYTES # BLD AUTO: 28.9 % — SIGNIFICANT CHANGE UP (ref 13–44)
MCHC RBC-ENTMCNC: 27.8 PG — SIGNIFICANT CHANGE UP (ref 27–34)
MCHC RBC-ENTMCNC: 31.4 GM/DL — LOW (ref 32–36)
MCV RBC AUTO: 88.6 FL — SIGNIFICANT CHANGE UP (ref 80–100)
MONOCYTES # BLD AUTO: 0.44 K/UL — SIGNIFICANT CHANGE UP (ref 0–0.9)
MONOCYTES NFR BLD AUTO: 7.9 % — SIGNIFICANT CHANGE UP (ref 2–14)
NEUTROPHILS # BLD AUTO: 2.92 K/UL — SIGNIFICANT CHANGE UP (ref 1.8–7.4)
NEUTROPHILS NFR BLD AUTO: 52.3 % — SIGNIFICANT CHANGE UP (ref 43–77)
NRBC # BLD: 0 /100 WBCS — SIGNIFICANT CHANGE UP (ref 0–0)
PLATELET # BLD AUTO: 316 K/UL — SIGNIFICANT CHANGE UP (ref 150–400)
POTASSIUM SERPL-MCNC: 4 MMOL/L — SIGNIFICANT CHANGE UP (ref 3.5–5.3)
POTASSIUM SERPL-SCNC: 4 MMOL/L — SIGNIFICANT CHANGE UP (ref 3.5–5.3)
PROT SERPL-MCNC: 6.9 G/DL — SIGNIFICANT CHANGE UP (ref 6–8.3)
RBC # BLD: 3.95 M/UL — LOW (ref 4.2–5.8)
RBC # FLD: 13.9 % — SIGNIFICANT CHANGE UP (ref 10.3–14.5)
SODIUM SERPL-SCNC: 143 MMOL/L — SIGNIFICANT CHANGE UP (ref 135–145)
WBC # BLD: 5.58 K/UL — SIGNIFICANT CHANGE UP (ref 3.8–10.5)
WBC # FLD AUTO: 5.58 K/UL — SIGNIFICANT CHANGE UP (ref 3.8–10.5)

## 2024-04-28 PROCEDURE — 99222 1ST HOSP IP/OBS MODERATE 55: CPT

## 2024-04-28 PROCEDURE — 99223 1ST HOSP IP/OBS HIGH 75: CPT

## 2024-04-28 RX ADMIN — SENNA PLUS 2 TABLET(S): 8.6 TABLET ORAL at 21:29

## 2024-04-28 RX ADMIN — OXYCODONE HYDROCHLORIDE 5 MILLIGRAM(S): 5 TABLET ORAL at 12:13

## 2024-04-28 RX ADMIN — POLYETHYLENE GLYCOL 3350 17 GRAM(S): 17 POWDER, FOR SOLUTION ORAL at 11:57

## 2024-04-28 RX ADMIN — OXYCODONE HYDROCHLORIDE 5 MILLIGRAM(S): 5 TABLET ORAL at 21:29

## 2024-04-28 RX ADMIN — TAMSULOSIN HYDROCHLORIDE 0.4 MILLIGRAM(S): 0.4 CAPSULE ORAL at 21:30

## 2024-04-28 RX ADMIN — OXYCODONE HYDROCHLORIDE 5 MILLIGRAM(S): 5 TABLET ORAL at 12:45

## 2024-04-28 RX ADMIN — LACOSAMIDE 150 MILLIGRAM(S): 50 TABLET ORAL at 05:07

## 2024-04-28 RX ADMIN — LACOSAMIDE 150 MILLIGRAM(S): 50 TABLET ORAL at 17:42

## 2024-04-28 RX ADMIN — ENOXAPARIN SODIUM 40 MILLIGRAM(S): 100 INJECTION SUBCUTANEOUS at 21:34

## 2024-04-28 RX ADMIN — Medication 3 MILLIGRAM(S): at 21:30

## 2024-04-28 RX ADMIN — Medication 81 MILLIGRAM(S): at 11:56

## 2024-04-28 RX ADMIN — ATORVASTATIN CALCIUM 20 MILLIGRAM(S): 80 TABLET, FILM COATED ORAL at 21:30

## 2024-04-28 RX ADMIN — OXYCODONE HYDROCHLORIDE 5 MILLIGRAM(S): 5 TABLET ORAL at 22:20

## 2024-04-28 RX ADMIN — GABAPENTIN 300 MILLIGRAM(S): 400 CAPSULE ORAL at 21:30

## 2024-04-28 RX ADMIN — GABAPENTIN 300 MILLIGRAM(S): 400 CAPSULE ORAL at 05:07

## 2024-04-28 RX ADMIN — Medication 1 TABLET(S): at 11:55

## 2024-04-28 RX ADMIN — GABAPENTIN 300 MILLIGRAM(S): 400 CAPSULE ORAL at 15:57

## 2024-04-28 NOTE — CONSULT NOTE ADULT - ASSESSMENT
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 CTA demonstrated a large hematoma in the left thigh w/ a small hematoma of the RLE. Patient s/p hematoma evacuation by vascular on 4/5. Returned to OR w/ vascular on 4/19 for washout after found to have multiorganism skin infection.  Patient had wound vac placed on 4/26 by wound team. Patient completed IV zosyn on 4/25 for skin infection. Patient discharged to [place] rehab on 4/27.    #Hematoma s/p evacuation  #weakness, debility  #skin infection  - s/p hematoma evacuation w/ vascular on 4/5 c/b multiorganism infection s/p wash out on 4/19  - s/p zosyn completed on 4/25 per ID recommendation  - s/p Plastics consult for wound eval - recommended outpatient appt  - wound vac placed on 4/26  - Consult wound care  - comprehensive rehab for weakness, walks with walker  - continue aspirin 81mg    ADHD  - home med: Vyvanse 50mg daily    Seizure disorder  - Vimpat 150mg BID    Vertigo  - Meclizine prn     HLD  - Lipitor 20mg    DVT prophylaxis:   - If cleared for anticoagulation from surgical perspective, use Lovenox  - Last Doppler on 4/6 -> negative for B/L LE DVTs 52 yo M with hx of CVA with residual expressive aphasia, 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 on 4/4/24 for atraumatic left thigh swelling. CTA demonstrated a large hematoma in the left thigh w/ a small hematoma of the RLE. Patient s/p hematoma evacuation by vascular on 4/5. Returned to OR w/ vascular on 4/19 for washout after found to have multiorganism skin infection.  Patient had wound vac placed on 4/26 by wound team. Patient completed IV zosyn on 4/25 for skin infection. Patient discharged to [place] rehab on 4/27.    #Hematoma s/p evacuation  #weakness, debility  #skin infection  - s/p hematoma evacuation w/ vascular on 4/5 c/b multiorganism infection s/p wash out on 4/19  - s/p zosyn completed on 4/25 per ID recommendation  - s/p Plastics consult for wound eval - recommended outpatient appt  - wound vac placed on 4/26  - Consult wound care  - comprehensive rehab for weakness, walks with walker    hx CVA  - continue aspirin 81mg    ADHD  - home med: Vyvanse 50mg daily    Seizure disorder  - Vimpat 150mg BID    Vertigo  - Meclizine prn     HLD  - Lipitor 20mg    DVT prophylaxis:   - If cleared for anticoagulation from surgical perspective, use Lovenox  - Last Doppler on 4/6 -> negative for B/L LE DVTs

## 2024-04-28 NOTE — CONSULT NOTE ADULT - TIME BILLING
- Ordering, reviewing, and interpreting labs, testing, and imaging.  - Independently obtaining a review of systems and performing a physical exam  - Reviewing prior hospitalization and where necessary, outpatient records.  - Counselling and educating patient and family regarding interpretation of aforementioned items and plan of care.

## 2024-04-28 NOTE — CONSULT NOTE ADULT - SUBJECTIVE AND OBJECTIVE BOX
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 CTA demonstrated a large hematoma in the left thigh w/ a small hematoma of the RLE. Patient s/p hematoma evacuation by vascular on 4/5. plastic surgery consulted and recommending outpatient follow up. IR consulted but no drainage offered. Patient s/p OR w/ vascular on 4/19 for washout after found to have multiorganism skin infection.  Patient had wound vac placed on 4/26 by wound team. Patient completed IV zosyn on 4/25 for skin infection. Patient seen by rehab team and recommended for acute inpatient rehabilitation. Patient discharged to [facility] on 4/27 after medically stabilized and optimized.     patient found asleep and comfortable. has 3/10 pain, otherwise no complaints. has residual numbness/weakness from prior LLE fasciotomy. has using walker.      PAST MEDICAL & SURGICAL HISTORY:  CVA (cerebrovascular accident)      H/O fasciotomy          Review of Systems:   CONSTITUTIONAL: No fever, weight loss, or fatigue  EYES: No eye pain, visual disturbances, or discharge  ENMT:  No difficulty hearing, tinnitus, vertigo; No sinus or throat pain  NECK: No pain or stiffness  RESPIRATORY: No cough, wheezing, chills or hemoptysis; No shortness of breath  CARDIOVASCULAR: No chest pain, palpitations, dizziness, or leg swelling  GASTROINTESTINAL: No abdominal or epigastric pain. No nausea, vomiting, or hematemesis; No diarrhea or constipation. No melena or hematochezia.  GENITOURINARY: No dysuria, frequency, hematuria, or incontinence  NEUROLOGICAL: No headaches, memory loss, or tremors  SKIN: No itching, burning, rashes, or lesions   LYMPH NODES: No enlarged glands  ENDOCRINE: No heat or cold intolerance; No hair loss  MUSCULOSKELETAL: L thigh pain 3/10. No joint pain or swelling; No muscle, back,   HEME/LYMPH: No easy bruising, or bleeding gums  ALLERY AND IMMUNOLOGIC: No hives or eczema    Allergies    No Known Allergies    Intolerances        Social History:     FAMILY HISTORY:  Known health problems: none        MEDICATIONS  (STANDING):  aspirin  chewable 81 milliGRAM(s) Oral daily  atorvastatin 20 milliGRAM(s) Oral at bedtime  enoxaparin Injectable 40 milliGRAM(s) SubCutaneous every 24 hours  gabapentin 300 milliGRAM(s) Oral three times a day  lacosamide 150 milliGRAM(s) Oral two times a day  melatonin 3 milliGRAM(s) Oral at bedtime  multivitamin 1 Tablet(s) Oral daily  polyethylene glycol 3350 17 Gram(s) Oral daily  senna 2 Tablet(s) Oral at bedtime  tamsulosin 0.4 milliGRAM(s) Oral at bedtime    MEDICATIONS  (PRN):  acetaminophen     Tablet .. 650 milliGRAM(s) Oral every 6 hours PRN Mild Pain (1 - 3)  meclizine 12.5 milliGRAM(s) Oral three times a day PRN Dizziness  oxyCODONE    IR 5 milliGRAM(s) Oral every 6 hours PRN Moderate Pain (4 - 6)  oxyCODONE    IR 10 milliGRAM(s) Oral every 6 hours PRN Severe Pain (7 - 10)      Vital Signs Last 24 Hrs  T(C): 36.6 (28 Apr 2024 08:16), Max: 36.7 (27 Apr 2024 21:48)  T(F): 97.8 (28 Apr 2024 08:16), Max: 98.1 (27 Apr 2024 21:48)  HR: 56 (28 Apr 2024 08:16) (56 - 72)  BP: 100/65 (28 Apr 2024 08:16) (100/65 - 108/74)  BP(mean): --  RR: 15 (28 Apr 2024 08:16) (15 - 16)  SpO2: 97% (28 Apr 2024 08:16) (96% - 99%)    Parameters below as of 28 Apr 2024 08:16  Patient On (Oxygen Delivery Method): room air      CAPILLARY BLOOD GLUCOSE            PHYSICAL EXAM:  GENERAL: NAD  HEAD:  Atraumatic, Normocephalic  EYES: EOMI, conjunctiva and sclera clear  NECK: Supple, No JVD  CHEST/LUNG: Clear to auscultation bilaterally; No wheeze  HEART: Regular rate and rhythm; No murmurs, rubs, or gallops  ABDOMEN: Soft, Nontender, Nondistended; Bowel sounds present  EXTREMITIES:  2+ Peripheral Pulses, No clubbing, cyanosis, or edema  NEUROLOGY: LLE wrapped to knee, hallux numbness.  SKIN: L thigh with wound dressed with vac. clean, intact, no drainage or skin changes noted surrounding.     LABS:                        11.0   5.58  )-----------( 316      ( 28 Apr 2024 05:29 )             35.0     04-28    143  |  107  |  23  ----------------------------<  88  4.0   |  29  |  0.98    Ca    8.9      28 Apr 2024 05:29    TPro  6.9  /  Alb  2.8<L>  /  TBili  0.3  /  DBili  x   /  AST  12  /  ALT  22  /  AlkPhos  80  04-28          Urinalysis Basic - ( 28 Apr 2024 05:29 )    Color: x / Appearance: x / SG: x / pH: x  Gluc: 88 mg/dL / Ketone: x  / Bili: x / Urobili: x   Blood: x / Protein: x / Nitrite: x   Leuk Esterase: x / RBC: x / WBC x   Sq Epi: x / Non Sq Epi: x / Bacteria: x        EKG(personally reviewed):    RADIOLOGY & ADDITIONAL TESTS:    Imaging Personally Reviewed:    Consultant(s) Notes Reviewed:      Care Discussed with Consultants/Other Providers:

## 2024-04-29 LAB
ALBUMIN SERPL ELPH-MCNC: 2.6 G/DL — LOW (ref 3.3–5)
ALP SERPL-CCNC: 71 U/L — SIGNIFICANT CHANGE UP (ref 40–120)
ALT FLD-CCNC: 19 U/L — SIGNIFICANT CHANGE UP (ref 10–45)
ANION GAP SERPL CALC-SCNC: 8 MMOL/L — SIGNIFICANT CHANGE UP (ref 5–17)
AST SERPL-CCNC: 12 U/L — SIGNIFICANT CHANGE UP (ref 10–40)
BASOPHILS # BLD AUTO: 0.1 K/UL — SIGNIFICANT CHANGE UP (ref 0–0.2)
BASOPHILS NFR BLD AUTO: 1.7 % — SIGNIFICANT CHANGE UP (ref 0–2)
BILIRUB SERPL-MCNC: 0.2 MG/DL — SIGNIFICANT CHANGE UP (ref 0.2–1.2)
BUN SERPL-MCNC: 25 MG/DL — HIGH (ref 7–23)
CALCIUM SERPL-MCNC: 8.4 MG/DL — SIGNIFICANT CHANGE UP (ref 8.4–10.5)
CHLORIDE SERPL-SCNC: 107 MMOL/L — SIGNIFICANT CHANGE UP (ref 96–108)
CO2 SERPL-SCNC: 28 MMOL/L — SIGNIFICANT CHANGE UP (ref 22–31)
CREAT SERPL-MCNC: 1.02 MG/DL — SIGNIFICANT CHANGE UP (ref 0.5–1.3)
EGFR: 88 ML/MIN/1.73M2 — SIGNIFICANT CHANGE UP
EOSINOPHIL # BLD AUTO: 0.48 K/UL — SIGNIFICANT CHANGE UP (ref 0–0.5)
EOSINOPHIL NFR BLD AUTO: 7.9 % — HIGH (ref 0–6)
GLUCOSE SERPL-MCNC: 98 MG/DL — SIGNIFICANT CHANGE UP (ref 70–99)
HCT VFR BLD CALC: 33.2 % — LOW (ref 39–50)
HGB BLD-MCNC: 10.7 G/DL — LOW (ref 13–17)
IMM GRANULOCYTES NFR BLD AUTO: 0.7 % — SIGNIFICANT CHANGE UP (ref 0–0.9)
LYMPHOCYTES # BLD AUTO: 1.95 K/UL — SIGNIFICANT CHANGE UP (ref 1–3.3)
LYMPHOCYTES # BLD AUTO: 32.2 % — SIGNIFICANT CHANGE UP (ref 13–44)
MCHC RBC-ENTMCNC: 28.2 PG — SIGNIFICANT CHANGE UP (ref 27–34)
MCHC RBC-ENTMCNC: 32.2 GM/DL — SIGNIFICANT CHANGE UP (ref 32–36)
MCV RBC AUTO: 87.6 FL — SIGNIFICANT CHANGE UP (ref 80–100)
MONOCYTES # BLD AUTO: 0.59 K/UL — SIGNIFICANT CHANGE UP (ref 0–0.9)
MONOCYTES NFR BLD AUTO: 9.8 % — SIGNIFICANT CHANGE UP (ref 2–14)
NEUTROPHILS # BLD AUTO: 2.89 K/UL — SIGNIFICANT CHANGE UP (ref 1.8–7.4)
NEUTROPHILS NFR BLD AUTO: 47.7 % — SIGNIFICANT CHANGE UP (ref 43–77)
NRBC # BLD: 0 /100 WBCS — SIGNIFICANT CHANGE UP (ref 0–0)
PLATELET # BLD AUTO: 268 K/UL — SIGNIFICANT CHANGE UP (ref 150–400)
POTASSIUM SERPL-MCNC: 4.1 MMOL/L — SIGNIFICANT CHANGE UP (ref 3.5–5.3)
POTASSIUM SERPL-SCNC: 4.1 MMOL/L — SIGNIFICANT CHANGE UP (ref 3.5–5.3)
PROT SERPL-MCNC: 6.2 G/DL — SIGNIFICANT CHANGE UP (ref 6–8.3)
RBC # BLD: 3.79 M/UL — LOW (ref 4.2–5.8)
RBC # FLD: 14 % — SIGNIFICANT CHANGE UP (ref 10.3–14.5)
SODIUM SERPL-SCNC: 143 MMOL/L — SIGNIFICANT CHANGE UP (ref 135–145)
WBC # BLD: 6.05 K/UL — SIGNIFICANT CHANGE UP (ref 3.8–10.5)
WBC # FLD AUTO: 6.05 K/UL — SIGNIFICANT CHANGE UP (ref 3.8–10.5)

## 2024-04-29 PROCEDURE — 99232 SBSQ HOSP IP/OBS MODERATE 35: CPT

## 2024-04-29 RX ORDER — LIDOCAINE 4 G/100G
1 CREAM TOPICAL
Refills: 0 | Status: DISCONTINUED | OUTPATIENT
Start: 2024-04-29 | End: 2024-05-10

## 2024-04-29 RX ADMIN — GABAPENTIN 300 MILLIGRAM(S): 400 CAPSULE ORAL at 15:06

## 2024-04-29 RX ADMIN — Medication 5 MILLIGRAM(S): at 21:53

## 2024-04-29 RX ADMIN — GABAPENTIN 300 MILLIGRAM(S): 400 CAPSULE ORAL at 21:35

## 2024-04-29 RX ADMIN — SENNA PLUS 2 TABLET(S): 8.6 TABLET ORAL at 21:34

## 2024-04-29 RX ADMIN — LACOSAMIDE 150 MILLIGRAM(S): 50 TABLET ORAL at 06:02

## 2024-04-29 RX ADMIN — Medication 81 MILLIGRAM(S): at 12:00

## 2024-04-29 RX ADMIN — ATORVASTATIN CALCIUM 20 MILLIGRAM(S): 80 TABLET, FILM COATED ORAL at 21:34

## 2024-04-29 RX ADMIN — Medication 650 MILLIGRAM(S): at 13:00

## 2024-04-29 RX ADMIN — GABAPENTIN 300 MILLIGRAM(S): 400 CAPSULE ORAL at 06:02

## 2024-04-29 RX ADMIN — TAMSULOSIN HYDROCHLORIDE 0.4 MILLIGRAM(S): 0.4 CAPSULE ORAL at 21:34

## 2024-04-29 RX ADMIN — Medication 650 MILLIGRAM(S): at 12:00

## 2024-04-29 RX ADMIN — OXYCODONE HYDROCHLORIDE 5 MILLIGRAM(S): 5 TABLET ORAL at 17:07

## 2024-04-29 RX ADMIN — Medication 1 TABLET(S): at 15:05

## 2024-04-29 RX ADMIN — Medication 3 MILLIGRAM(S): at 21:34

## 2024-04-29 RX ADMIN — OXYCODONE HYDROCHLORIDE 5 MILLIGRAM(S): 5 TABLET ORAL at 23:01

## 2024-04-29 RX ADMIN — POLYETHYLENE GLYCOL 3350 17 GRAM(S): 17 POWDER, FOR SOLUTION ORAL at 12:00

## 2024-04-29 RX ADMIN — OXYCODONE HYDROCHLORIDE 5 MILLIGRAM(S): 5 TABLET ORAL at 18:28

## 2024-04-29 RX ADMIN — ENOXAPARIN SODIUM 40 MILLIGRAM(S): 100 INJECTION SUBCUTANEOUS at 21:32

## 2024-04-29 RX ADMIN — LACOSAMIDE 150 MILLIGRAM(S): 50 TABLET ORAL at 17:07

## 2024-04-29 NOTE — DIETITIAN NUTRITION RISK NOTIFICATION - MALNUTRITION EVALUATION AS DEMONSTRATED BY (ADULTS > 20 YEARS OF AGE)
Loss of subcutaneous fat.../Loss of muscle... Weight loss.../Loss of subcutaneous fat.../Loss of muscle...

## 2024-04-29 NOTE — DIETITIAN INITIAL EVALUATION ADULT - NSFNSNUTRCHEWSWALLOWFT_GEN_A_CORE
Tolerates Diet Consistency Well  Tolerates Fluid Consistency Well  No Chewing/Swallowing Difficulties (Per Patient)   Will Discuss w/ Speech Therapy

## 2024-04-29 NOTE — DIETITIAN INITIAL EVALUATION ADULT - ADD RECOMMEND
1) Monitor Weights, Intake, Tolerance, Skin & Labwork  2) Ensure Max 11oz PO Daily  3) Education Provided on Need for Supplementation, Need for Increased Fluids/Fiber & Education Provided on Proper Nutrition   4) Continue Nutrition Plan of Care

## 2024-04-29 NOTE — DIETITIAN INITIAL EVALUATION ADULT - NSPROEDALEARNPREF_GEN_A_NUR
Education Provided on Need for Supplementation, Need for Increased Fluids/Fiber & Education Provided on Proper Nutrition/verbal instruction

## 2024-04-29 NOTE — DIETITIAN INITIAL EVALUATION ADULT - NS FNS DIET ORDER
Regular Diet (IDDSI Level 7) w/ Thin Liquids (IDDSI Level 0)  Recommend Initiate Ensure Max 11oz PO Daily (Provides 150kcal & 30grams of Protein)  Education Provided on Need for Supplementation, Need for Increased Fluids/Fiber & Education Provided on Proper Nutrition

## 2024-04-29 NOTE — DIETITIAN NUTRITION RISK NOTIFICATION - TREATMENT: THE FOLLOWING DIET HAS BEEN RECOMMENDED
Recommend Initiate Ensure Max 11oz PO Daily (Provides 150kcal & 30grams of Protein)   Nutrition Education Provided

## 2024-04-29 NOTE — DIETITIAN INITIAL EVALUATION ADULT - PERTINENT MEDS FT
MEDICATIONS  (STANDING):  aspirin  chewable 81 milliGRAM(s) Oral daily  atorvastatin 20 milliGRAM(s) Oral at bedtime  enoxaparin Injectable 40 milliGRAM(s) SubCutaneous every 24 hours  gabapentin 300 milliGRAM(s) Oral three times a day  lacosamide 150 milliGRAM(s) Oral two times a day  melatonin 3 milliGRAM(s) Oral at bedtime  multivitamin 1 Tablet(s) Oral daily  polyethylene glycol 3350 17 Gram(s) Oral daily  senna 2 Tablet(s) Oral at bedtime  tamsulosin 0.4 milliGRAM(s) Oral at bedtime    MEDICATIONS  (PRN):  acetaminophen     Tablet .. 650 milliGRAM(s) Oral every 6 hours PRN Mild Pain (1 - 3)  meclizine 12.5 milliGRAM(s) Oral three times a day PRN Dizziness  oxyCODONE    IR 5 milliGRAM(s) Oral every 6 hours PRN Moderate Pain (4 - 6)  oxyCODONE    IR 10 milliGRAM(s) Oral every 6 hours PRN Severe Pain (7 - 10)

## 2024-04-29 NOTE — DIETITIAN INITIAL EVALUATION ADULT - PERTINENT LABORATORY DATA
04-29    143  |  107  |  25<H>  ----------------------------<  98  4.1   |  28  |  1.02    Ca    8.4      29 Apr 2024 06:00    TPro  6.2  /  Alb  2.6<L>  /  TBili  0.2  /  DBili  x   /  AST  12  /  ALT  19  /  AlkPhos  71  04-29

## 2024-04-29 NOTE — PROGRESS NOTE ADULT - SUBJECTIVE AND OBJECTIVE BOX
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 on 4/4/24 for atraumatic left thigh swelling. CTA demonstrated a large hematoma in the left thigh w/ a small hematoma of the RLE. Patient s/p hematoma evacuation by vascular on 4/5. plastic surgery consulted and recommending outpatient follow up. IR consulted but no drainage offered. Patient s/p OR w/ vascular on 4/19 for washout after found to have multiorganism skin infection.  Patient had wound vac placed on 4/26 by wound team. Patient completed IV zosyn on 4/25 for skin infection. Patient seen by rehab team and recommended for acute inpatient rehabilitation. Patient discharged to [facility] on 4/27 after medically stabilized and optimized.     No acute evnts since admission. pain well managed. no fever/ chills. denies any new complaints.    T(C): 36.6 (04-29-24 @ 09:25), Max: 36.7 (04-28-24 @ 21:45)  T(F): 97.8 (04-29-24 @ 09:25), Max: 98 (04-28-24 @ 21:45)  HR: 63 (04-29-24 @ 09:25) (61 - 63)  BP: 116/78 (04-29-24 @ 09:25) (105/74 - 116/78)  ABP: --  ABP(mean): --  RR: 17 (04-29-24 @ 09:25) (15 - 17)  SpO2: 99% (04-29-24 @ 09:25) (98% - 99%)    on exam aaox3, no apparant distress  both lungs clear  s1, s2, regular  abdomen- soft  no pedal edema  left lower leg with surgical scar on medial side  left medial thigh- wound with dressing - clean and dry, no surrounding warmth/ erythema/ tenderness  can move all extremities.    MEDICATIONS  (STANDING):  aspirin  chewable 81 milliGRAM(s) Oral daily  atorvastatin 20 milliGRAM(s) Oral at bedtime  enoxaparin Injectable 40 milliGRAM(s) SubCutaneous every 24 hours  gabapentin 300 milliGRAM(s) Oral three times a day  lacosamide 150 milliGRAM(s) Oral two times a day  melatonin 3 milliGRAM(s) Oral at bedtime  multivitamin 1 Tablet(s) Oral daily  polyethylene glycol 3350 17 Gram(s) Oral daily  senna 2 Tablet(s) Oral at bedtime  tamsulosin 0.4 milliGRAM(s) Oral at bedtime    MEDICATIONS  (PRN):  acetaminophen     Tablet .. 650 milliGRAM(s) Oral every 6 hours PRN Mild Pain (1 - 3)  meclizine 12.5 milliGRAM(s) Oral three times a day PRN Dizziness  oxyCODONE    IR 5 milliGRAM(s) Oral every 6 hours PRN Moderate Pain (4 - 6)  oxyCODONE    IR 10 milliGRAM(s) Oral every 6 hours PRN Severe Pain (7 - 10)      LABS:                        10.7   6.05  )-----------( 268      ( 29 Apr 2024 06:00 )             33.2     04-29    143  |  107  |  25<H>  ----------------------------<  98  4.1   |  28  |  1.02    Ca    8.4      29 Apr 2024 06:00    TPro  6.2  /  Alb  2.6<L>  /  TBili  0.2  /  DBili  x   /  AST  12  /  ALT  19  /  AlkPhos  71  04-29      Urinalysis Basic - ( 29 Apr 2024 06:00 )    Color: x / Appearance: x / SG: x / pH: x  Gluc: 98 mg/dL / Ketone: x  / Bili: x / Urobili: x   Blood: x / Protein: x / Nitrite: x   Leuk Esterase: x / RBC: x / WBC x   Sq Epi: x / Non Sq Epi: x / Bacteria: x    a/p:    #Hematoma s/p evacuation  #weakness, debility  #skin infection  - s/p hematoma evacuation w/ vascular on 4/5 c/b multiorganism infection s/p wash out on 4/19  - s/p zosyn completed on 4/25 per ID recommendation  - s/p Plastics consult for wound eval - recommended outpatient appt  - comprehensive rehab for weakness, walks with walker  - pain management    #  CVA  - continue aspirin 81mg, statin    # ADHD  - home med: Vyvanse 50mg daily    # Seizure disorder  - Vimpat 150mg BID    # Vertigo  - Meclizine prn     # HLD  - Lipitor 20mg

## 2024-04-29 NOTE — PROGRESS NOTE ADULT - ASSESSMENT
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  Patient now admitted for a multidisciplinary rehab program. 04-27    Hematoma s/p evacuation  Comprehensive Multidisciplinary Rehab Program:  - comprehensive rehab program of PT/OT/SLP - 3 hours a day, 5 days a week.   - s/p hematoma evacuation w/ vascular on 4/5  - plastics consulted for wound eval and recommended outpatient appt  - IR consulted but no intervention recommended  -Vascular w/ washout on 4/19 for skin infection  -wound vac placed on 4/26- wound care consult today for VAC  -aspirin 81mg  -cleared for DVT ppx w/Lovenox-only. Resume AC for CVA ppx per Vascular plan- primary team to follow up    Skin Infection  -surgical swab w/ multiorganism infection  -s/p zosyn  - completed abx on 4/25    Seizure disorder  -vimpat 150mg BID    ADHD  -vyvanse 50mg daily     Vertigo  -meclizine prn     HLD  - cont lipitor 20mg      Pain  - on Neurontin-?Titrate up for left foot pain  - Lidocaine patch to foot  - Tylenol PRN and oxycodone prn  - Avoid sedating medications that may interfere with cognitive recovery    GI / Bowel  - Senna qHS  - Miralax Daily      Skin / Pressure injury  - Skin assessment on admission performed [ X ] :   - Monitor Incisions:  wound vac and incision on the left thigh  - Turn q2 hours in bed while awake, air mattress  - nursing to monitor skin qShift  - soft heel protectors  - skin barrier cream PRN    DVT prophylaxis:   - Lovenox  - Last Doppler on 4/6 -> negative for B/L LE DVTs      Outpatient Follow-up:  UROLOGY [address] [place] [address] R  Scheduled Appointment: 05/13/2024    [facility]  UROLOGY [address] [place] [address] R  Scheduled Appointment: 05/13/2024    Rodolfo Johnson  Internal Medicine  [address] [place] [id number]  Phone: [phone]  Fax: [phone]  Follow Up Time:     Mariah Christie  Vascular Surgery  [address] [facility], [address]  [place] [address]  Phone: [phone]  Fax: [phone]  Follow Up Time:

## 2024-04-29 NOTE — DIETITIAN INITIAL EVALUATION ADULT - OTHER INFO
Initial Nutrition Assessment   53yr Old Male   Denies Food Allergy/Intolerance  Tolerates Diet Well - No Chewing/Swallowing Complications (Per Patient)  Consumed % of Meals (as Per Documentation)  Stage 1 Pressure Ulcers on Right Ankles (as Per Nursing Flow Sheets)  No Edema Noted (as Per Nursing Flow Sheets)  No Recent Nausea/Vomiting/Diarrhea & Some Recent Constipation (as Per Patient)

## 2024-04-29 NOTE — PROGRESS NOTE ADULT - SUBJECTIVE AND OBJECTIVE BOX
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 CTA demonstrated a large hematoma in the left thigh w/ a small hematoma of the RLE. Patient s/p hematoma evacuation by vascular on 4/5. plastic surgery consulted and recommending outpatient follow up. IR consulted but no drainage offered. Patient s/p OR w/ vascular on 4/19 for washout after found to have multiorganism skin infection.  Patient had wound vac placed on 4/26 by wound team. Patient completed IV zosyn on 4/25 for skin infection. Patient seen by rehab team and recommended for acute inpatient rehabilitation. Patient discharged to [place] on 4/27 after medically stabilized and optimized.     Subjective/ROS  NAD in chair, night uneventful, afebrile. No seizures reported, tolerating Vimpat.  On Lovenox DVT ppx dose  Wound care team to see today for VAC  RLE paresthesias reported-Lidocaine to left foot. ?Increase Neurontin  Denies any sob, chest pain, NV.      MEDICATIONS  (STANDING):  aspirin  chewable 81 milliGRAM(s) Oral daily  atorvastatin 20 milliGRAM(s) Oral at bedtime  enoxaparin Injectable 40 milliGRAM(s) SubCutaneous every 24 hours  gabapentin 300 milliGRAM(s) Oral three times a day  lacosamide 150 milliGRAM(s) Oral two times a day  melatonin 3 milliGRAM(s) Oral at bedtime  multivitamin 1 Tablet(s) Oral daily  polyethylene glycol 3350 17 Gram(s) Oral daily  senna 2 Tablet(s) Oral at bedtime  tamsulosin 0.4 milliGRAM(s) Oral at bedtime    MEDICATIONS  (PRN):  acetaminophen     Tablet .. 650 milliGRAM(s) Oral every 6 hours PRN Mild Pain (1 - 3)  meclizine 12.5 milliGRAM(s) Oral three times a day PRN Dizziness  oxyCODONE    IR 5 milliGRAM(s) Oral every 6 hours PRN Moderate Pain (4 - 6)  oxyCODONE    IR 10 milliGRAM(s) Oral every 6 hours PRN Severe Pain (7 - 10)                            10.7   6.05  )-----------( 268      ( 29 Apr 2024 06:00 )             33.2     04-29    143  |  107  |  25<H>  ----------------------------<  98  4.1   |  28  |  1.02    Ca    8.4      29 Apr 2024 06:00    TPro  6.2  /  Alb  2.6<L>  /  TBili  0.2  /  DBili  x   /  AST  12  /  ALT  19  /  AlkPhos  71  04-29    Urinalysis Basic - ( 29 Apr 2024 06:00 )    Color: x / Appearance: x / SG: x / pH: x  Gluc: 98 mg/dL / Ketone: x  / Bili: x / Urobili: x   Blood: x / Protein: x / Nitrite: x   Leuk Esterase: x / RBC: x / WBC x   Sq Epi: x / Non Sq Epi: x / Bacteria: x        Vital Signs Last 24 Hrs  T(C): 36.6 (29 Apr 2024 09:25), Max: 36.7 (28 Apr 2024 21:45)  T(F): 97.8 (29 Apr 2024 09:25), Max: 98 (28 Apr 2024 21:45)  HR: 63 (29 Apr 2024 09:25) (61 - 63)  BP: 116/78 (29 Apr 2024 09:25) (105/74 - 116/78)  BP(mean): --  RR: 17 (29 Apr 2024 09:25) (15 - 17)  SpO2: 99% (29 Apr 2024 09:25) (98% - 99%)    Parameters below as of 29 Apr 2024 09:25  Patient On (Oxygen Delivery Method): room air      Physical Exam: Gen - NAD, Comfortable  Neck - Supple, No limited ROM  Pulm - CTAB, No wheeze, No rhonchi, No crackles  Cardiovascular - RRR  Abdomen - Soft, NT/ND, +BS  Extremities - No C/C/E, No calf tenderness  Neuro-     Cognitive - AAOx3     Motor -                     LEFT    UE - ShAB 5/5, EF 5/5, EE 5/5, WE 5/5,  5/5                    RIGHT UE - ShAB 5/5, EF 5/5, EE 5/5, WE 5/5,  5/5                    LEFT    LE - HF 4/5, KE 4/5, DF 3/5, PF 4/5                    RIGHT LE - HF 4/5, KE 4/5, DF 4/5, PF 4/5        Sensory - hypersensitive feet  Psychiatric - Mood stable, Affect WNL  Skin:  Wound Measures 10.5cm x4.0cm x1.4cm L thigh, old fasciotomy site present, Stage 1 nonblanchable on the right ankle      Continue comprehensive acute rehab program consisting of 3hrs/day of OT/PT and SLP.

## 2024-04-29 NOTE — DIETITIAN INITIAL EVALUATION ADULT - WEIGHT IN KG
Infectious Diseases Daily Progress Note      Praneeth Nolen  Date of Service: 1/14/2020   Hospital Day: 8  Principal Diagnosis:                            This is  Praneeth Nolen who is a 76 year old  male admitted 1/7/2020  8:03 PM     1. Community vs HAP (health care associated pneumonia)   2. History of metastatic prostate cancer, bone and possible lung involvement  3. Altered mental status  4. History of DM, HTN, GERD, dyslipidemia, malnutrition. GLADIS, colon cancer.      Current antimicrobials:                             Vancomycin  Cefepime  Acyclovir.     Scheduled Medications acyclovir, 400 mg, Q8H  insulin lispro protamine-insulin lispro, 15 Units, BID AC  VANCOMYCIN - PHARMACIST MONITORED, , See Admin Instructions  metroNIDAZOLE (FLAGYL) IVPB, 500 mg, 3 times per day  vancomycin (VANCOCIN) IVPB, 750 mg, 2 times per day  aspirin, 81 mg, Daily  atorvastatin, 20 mg, Daily  pantoprazole, 40 mg, Daily  mirtazapine, 15 mg, Nightly  LORazepam, 1 mg, Nightly  lamoTRIgine, 50 mg, Daily  heparin (porcine), 5,000 Units, 3 times per day  cefepime (MAXIPIME) IVPB, 1,000 mg, 3 times per day  sodium chloride (PF), 2 mL, 2 times per day        PMHx, Social Hx , Medications and Allergies reviewed.    Reviewed Pertinent: Laboratory studies, radiographic studies, medications, and recent progress notes.    Subjective:  Deteriorated today.     ROS: No fever, chills, no nausea or abd pain, diarrhea  , No rashes     Objective:     Vital Signs:   Visit Vitals  /50 (BP Location: RUE - Right upper extremity, Patient Position: Semi-Quintana's)   Pulse 116   Temp 100.7 °F (38.2 °C) (Axillary)   Resp (!) 42   Ht 5' 6\" (1.676 m)   Wt 82.5 kg   SpO2 93%   BMI 29.36 kg/m²      Temp  Min: 98.1 °F (36.7 °C)  Max: 100.7 °F (38.2 °C)     Physical Exam:    General appearance: NAD , barely responsive. .   Head: Normocephalic  MM mild dry, poor dentition  Neck: no JVD and supple   Lungs:  decreased BS bases, no other  aggregates  Heart: regular, S1 and S2  Abdomen: soft, non tender.   Extremities: no edema or cyanosis, no cellulitis. has drain.   Skin: Skin color, texture, turgor normal. No rashes or lesions  Neurologic: lethargic,  Non focal.          Labs Reviewed    Reviewed     Recent Labs   Lab 01/14/20  0550 01/13/20  1511 01/12/20  2313 01/12/20  0941 01/11/20  0546 01/08/20  0710 01/07/20 2027   SODIUM  --   --   --  144  --  137 137   POTASSIUM  --  3.9 3.3* 3.1*  --  3.5 3.2*   CHLORIDE  --   --   --  111*  --  102 100   CO2  --   --   --  24  --  28 28   ANIONGAP  --   --   --  12  --  10 12   BUN  --   --   --  18  --  13 13   CREATININE 1.21*  --   --  0.87 0.80 0.71 0.73   GFRNA 58  --   --  84 87 >90 90   GFRA 67  --   --  >90 >90 >90 >90   GLUCOSE  --   --   --  200*  --  336* 352*   CALCIUM  --   --   --  9.6  --  9.3 9.2   ALBUMIN  --   --   --  1.3*  --  1.6* 1.7*   AST  --   --   --  10  --  7 10   GPT  --   --   --  7  --  8 9   ALKPT  --   --   --  103  --  78 92   BILIRUBIN  --   --   --  0.7  --  0.7 0.7    Recent Labs   Lab 01/12/20  0941 01/07/20 2027   WBC 15.2* 13.4*   HGB 7.0* 7.7*   HCT 26.6* 28.6*   * 675*   RAPDTR <0.02  --    NH3 14  --    )       Lab Results   Component Value Date    VANCT 26.5 (HH) 01/11/2020           URINALYSIS  Recent Labs   Lab 01/07/20 2058   USPG 1.020   UPH 5.5   UKET NEGATIVE   UPROT TRACE*   UGLU NEGATIVE   UBILI NEGATIVE   UROB 1.0   UWBC NEGATIVE   UNITR NEGATIVE   URBC NEGATIVE          Microbiology:     BC 1/3-7 negative.   Influenza: negative.   Respiratory pathogen: negative.   MRSA: negative.   1/12 aspirate thigh: pending.     Radiology/Imaging:      CXR 1/3: 1.  Interval increase in size of left upper lobe nodule now measuring on  radiograph 4 x 3 cm which classifies the nodule now as a mass. Mass  measured 2 cm on CT chest 9/11/2019. Suspected pulmonary nodule at the  right lung base is also identified, conglomerate of findings suspicious  for  progression of disease.  2.  No other acute cardiopulmonary findings.     CXR 1/8/20:  IMPRESSION:  1.  New left retrocardiac opacities. Could represent atelectasis versus infiltrate.  2.  Stable nodular inferior right lung and left upper lobe nodule/masses. These remains suspicious. Enlarged/new since CT chest 09/11/19.     XR hip: Significant destruction involving the medial right ischium including the  pubis and the pubic rami. This could be related to traumatic fracture  and/or neoplasm. Consider CT with contrast for further evaluation.     CTH: 1.  No acute intracranial abnormality.  2.  There is mild age-related volume loss and sequela of chronic  microvascular ischemic changes.     CTC: 1. There is a 4.1 cm mass in the left upper lobe which has markedly  increased in size, previously 1.8 cm. There is a new 6.7 cm mass in the  left lower lobe. Multiple new nodules throughout the lungs. There is a 4.1  cm anterior mediastinal mass which extends into the medial aspect of the  right upper lobe. The findings are most consistent with progression of  metastatic disease.  2. Small bilateral pleural effusions.     CT guided bx: IMPRESSION:   CT-guided Core biopsy of left upper lobe lung mass as described above.     CT pelvis: 1.  This exam is degraded secondary to lack of intravenous contrast.  2.  New, 10 cm heterogeneous fluid collection medial to the proximal right  femur, centered within the adductor muscles with multiple foci of air  within the collection. This appears to invade and causes osseous  destruction of the right pubis and inferior pubic ramus. This is concerning  for abscess with associated osteomyelitis. However, given patient history,  metastatic disease cannot be excluded.  3.  Mild interval increase in size of fluid collection medial to the left  acetabulum, centered within the obturator internus with permeative  appearance of the left acetabulum.   4.  Heterogeneity of the left iliac wing  with associated periosteal  reaction. The adjacent gluteus minimus is asymmetrically enlarged compared  to the right.        MRI Femur R: 1.  Large approximately 9.7 x 10.1 x 13.8 cm peripheral enhancing fluid  collection centered in the right adductor musculature, similar to recent CT  1/10/2020. Overall the appearance favors infectious collection/abscess  although given history of metastatic disease clinical correlation is  suggested.  2.  Fistula connections to the bladder or rectum/colon are not excluded as  described above.  3.  Osseous findings most compatible with extensive osteomyelitis in the  pelvis. No definitive osteomyelitis of the right femur. Small right hip  effusion with synovitis, could be reactive or infectious.   4.  Prominent myositis about the right thigh/hip as described including  potential myonecrosis of the vastus lateralis.  5.  Partially visualized fluid collection in the left pelvis and  destructive changes of the left superior pubic ramus, better seen on pelvic  CT 1/10/2020.    Lung biopsy: Left lung, needle core biopsy of mass:     - Atypical cells present, suspicious for malignancy, please see comment.         Soft tissue, right thigh, needle aspiration of abscess:     - Abscess contents, final diagnosis pending evaluation of fungal stains.         Assessment:     78 year old gentleman with PMH of metastatic prostate cancer to lungs and bone, here with new development of a pneumonia.      1. Community vs HAP (health care associated pneumonia)   Lung cytology consistent with possible malignancy.   2. History of metastatic prostate cancer, bone and lung involvement  Now fluid collection in pelvis. Infectious vs metastasis, myositis, osteomyelitis mentioned on MRI.   Favor more malignancy.   3. Altered mental status  4. History of DM, HTN, GERD, dyslipidemia, malnutrition. GLADIS, colon cancer.         Plan:     1. Continue cefepime, re start  Vancomycin for new fluid collection/abscess  despite negative MRSA screen   Flagyl for presence of air on fluid collection, possible GI source (Unclear).   2. Respiratory cultures. Pending.   Fungal etiology raised. Doubt it.   Will do fungal Ag. Cultures sent and pending.   3. MRSA screening. Negative.   4. Orthopedic surgery saw patient, planning debridement/drainage, considering possible fistula from prior procedures (??)  Concerned for fistula.     Hip was aspirated. No fluid analysis available. Cytology is pending. Cultures negative so far.     Lung biopsy consistent with malignancy.     Cultures negative so far.    If procedures done, I would appreciate tissue cultures and pathology. (ideally bone)    Surgery saw patient, pending recommendations.      Based on labs above, may recommend final therapy.            Frankie Vivar M.D.  Infectious Diseases  Pager: [phone]  1/14/2020  10:44 AM     90.2

## 2024-04-30 PROCEDURE — 99232 SBSQ HOSP IP/OBS MODERATE 35: CPT

## 2024-04-30 PROCEDURE — 99233 SBSQ HOSP IP/OBS HIGH 50: CPT | Mod: GC

## 2024-04-30 RX ADMIN — LACOSAMIDE 150 MILLIGRAM(S): 50 TABLET ORAL at 06:23

## 2024-04-30 RX ADMIN — GABAPENTIN 300 MILLIGRAM(S): 400 CAPSULE ORAL at 21:17

## 2024-04-30 RX ADMIN — GABAPENTIN 300 MILLIGRAM(S): 400 CAPSULE ORAL at 13:09

## 2024-04-30 RX ADMIN — LIDOCAINE 1 PATCH: 4 CREAM TOPICAL at 18:53

## 2024-04-30 RX ADMIN — OXYCODONE HYDROCHLORIDE 10 MILLIGRAM(S): 5 TABLET ORAL at 21:17

## 2024-04-30 RX ADMIN — OXYCODONE HYDROCHLORIDE 10 MILLIGRAM(S): 5 TABLET ORAL at 22:10

## 2024-04-30 RX ADMIN — OXYCODONE HYDROCHLORIDE 10 MILLIGRAM(S): 5 TABLET ORAL at 11:43

## 2024-04-30 RX ADMIN — ENOXAPARIN SODIUM 40 MILLIGRAM(S): 100 INJECTION SUBCUTANEOUS at 21:14

## 2024-04-30 RX ADMIN — SENNA PLUS 2 TABLET(S): 8.6 TABLET ORAL at 21:17

## 2024-04-30 RX ADMIN — Medication 650 MILLIGRAM(S): at 21:45

## 2024-04-30 RX ADMIN — OXYCODONE HYDROCHLORIDE 5 MILLIGRAM(S): 5 TABLET ORAL at 00:00

## 2024-04-30 RX ADMIN — GABAPENTIN 300 MILLIGRAM(S): 400 CAPSULE ORAL at 06:24

## 2024-04-30 RX ADMIN — LIDOCAINE 1 PATCH: 4 CREAM TOPICAL at 06:23

## 2024-04-30 RX ADMIN — LIDOCAINE 1 PATCH: 4 CREAM TOPICAL at 06:24

## 2024-04-30 RX ADMIN — OXYCODONE HYDROCHLORIDE 10 MILLIGRAM(S): 5 TABLET ORAL at 12:40

## 2024-04-30 RX ADMIN — ATORVASTATIN CALCIUM 20 MILLIGRAM(S): 80 TABLET, FILM COATED ORAL at 21:17

## 2024-04-30 RX ADMIN — Medication 81 MILLIGRAM(S): at 11:39

## 2024-04-30 RX ADMIN — TAMSULOSIN HYDROCHLORIDE 0.4 MILLIGRAM(S): 0.4 CAPSULE ORAL at 21:17

## 2024-04-30 RX ADMIN — Medication 650 MILLIGRAM(S): at 21:18

## 2024-04-30 RX ADMIN — Medication 1 TABLET(S): at 11:39

## 2024-04-30 RX ADMIN — LACOSAMIDE 150 MILLIGRAM(S): 50 TABLET ORAL at 18:30

## 2024-04-30 RX ADMIN — POLYETHYLENE GLYCOL 3350 17 GRAM(S): 17 POWDER, FOR SOLUTION ORAL at 11:41

## 2024-04-30 NOTE — PROGRESS NOTE ADULT - ASSESSMENT
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  Patient now admitted for a multidisciplinary rehab program. 04-27    Hematoma s/p evacuation  Comprehensive Multidisciplinary Rehab Program:  - comprehensive rehab program of PT/OT/SLP - 3 hours a day, 5 days a week.   - s/p hematoma evacuation w/ vascular on 4/5  - plastics consulted for wound eval and recommended outpatient appt  - IR consulted but no intervention recommended  -Vascular w/ washout on 4/19 for skin infection  -wound vac placed on 4/26- wound care consult for VAC  -aspirin 81mg  -cleared for DVT ppx w/Lovenox-only. Resume AC for CVA ppx per Vascular plan     Skin Infection  -surgical swab w/ multiorganism infection  -s/p zosyn  - completed abx on 4/25    Seizure disorder  -Vimpat 150mg BID    ADHD  -Vyvanse 50mg daily     Vertigo  -Meclizine prn     HLD  - Lipitor 20mg    Pain  - on Neurontin-?Titrate up for left foot pain  - Lidocaine patch to foot  - Tylenol PRN and oxycodone prn  - Avoid sedating medications that may interfere with cognitive recovery    GI / Bowel  - Senna qHS  - Miralax Daily    Skin / Pressure injury  - Skin assessment on admission performed: No pressure injury  - Monitor Incisions:  wound vac and incision on the left thigh  - Turn q2 hours in bed while awake, air mattress  - nursing to monitor skin qShift  - soft heel protectors  - skin barrier cream PRN    DVT prophylaxis:   - Lovenox  - Last Doppler on 4/6 -> negative for B/L LE DVTs      Outpatient Follow-up:  UROLOGY [facility]  Scheduled Appointment: 05/13/2024    [facility]  UROLOGY [address]  Scheduled Appointment: 05/13/2024    Rodolfo Johnson  Internal Medicine  [address] [place] [address], [address] [place] [address]  Phone: [phone]  Fax: [phone]  Follow Up Time:     Mariah Christie  Vascular Surgery  [address] [facility], [address]  [place] [address]  Phone: [phone]  Fax: [phone]  Follow Up Time:

## 2024-04-30 NOTE — PROGRESS NOTE ADULT - SUBJECTIVE AND OBJECTIVE BOX
54 yo M with hx of CVA with residual expressive aphasia, bilateral DVTs on chronic Lovenox SQ (150Qd), compartment syndrome status post left fasciotomy (L below the knee), vertigo, seizure disorder who presented to [facility] on 4/4/24 for atraumatic left thigh swelling. CTA demonstrated a large hematoma in the left thigh w/ a small hematoma of the RLE. Patient s/p hematoma evacuation by vascular on 4/5. plastic surgery consulted and recommending outpatient follow up. IR consulted but no drainage offered. Patient s/p OR w/ vascular on 4/19 for washout after found to have multiorganism skin infection.  Patient had wound vac placed on 4/26 by wound team. Patient completed IV zosyn on 4/25 for skin infection. Patient seen by rehab team and recommended for acute inpatient rehabilitation. Patient discharged to [facility] on 4/27 after medically stabilized and optimized.     No acute events overnight. no new complaints.    T(C): 36.6 (04-30-24 @ 08:20), Max: 36.6 (04-30-24 @ 08:20)  T(F): 97.8 (04-30-24 @ 08:20), Max: 97.8 (04-30-24 @ 08:20)  HR: 59 (04-30-24 @ 08:20) (59 - 60)  BP: 98/60 (04-30-24 @ 08:20) (98/60 - 110/70)  ABP: --  ABP(mean): --  RR: 16 (04-30-24 @ 08:20) (15 - 16)  SpO2: 99% (04-30-24 @ 08:20) (98% - 99%)    on exam aaox3, no apparant distress  both lungs clear  s1, s2, regular  abdomen- soft  no pedal edema  left lower leg with surgical scar on medial side  left medial thigh- wound with dressing - clean and dry, no surrounding warmth/ erythema/ tenderness  can move all extremities.      MEDICATIONS  (STANDING):  aspirin  chewable 81 milliGRAM(s) Oral daily  atorvastatin 20 milliGRAM(s) Oral at bedtime  enoxaparin Injectable 40 milliGRAM(s) SubCutaneous every 24 hours  gabapentin 300 milliGRAM(s) Oral three times a day  lacosamide 150 milliGRAM(s) Oral two times a day  lidocaine   4% Patch 1 Patch Transdermal <User Schedule>  melatonin 3 milliGRAM(s) Oral at bedtime  multivitamin 1 Tablet(s) Oral daily  polyethylene glycol 3350 17 Gram(s) Oral daily  senna 2 Tablet(s) Oral at bedtime  tamsulosin 0.4 milliGRAM(s) Oral at bedtime    MEDICATIONS  (PRN):  acetaminophen     Tablet .. 650 milliGRAM(s) Oral every 6 hours PRN Mild Pain (1 - 3)  bisacodyl 5 milliGRAM(s) Oral every 12 hours PRN Constipation  meclizine 12.5 milliGRAM(s) Oral three times a day PRN Dizziness  oxyCODONE    IR 5 milliGRAM(s) Oral every 6 hours PRN Moderate Pain (4 - 6)  oxyCODONE    IR 10 milliGRAM(s) Oral every 6 hours PRN Severe Pain (7 - 10)    LABS:                        10.7   6.05  )-----------( 268      ( 29 Apr 2024 06:00 )             33.2     04-29    143  |  107  |  25<H>  ----------------------------<  98  4.1   |  28  |  1.02    Ca    8.4      29 Apr 2024 06:00    TPro  6.2  /  Alb  2.6<L>  /  TBili  0.2  /  DBili  x   /  AST  12  /  ALT  19  /  AlkPhos  71  04-29      Urinalysis Basic - ( 29 Apr 2024 06:00 )    Color: x / Appearance: x / SG: x / pH: x  Gluc: 98 mg/dL / Ketone: x  / Bili: x / Urobili: x   Blood: x / Protein: x / Nitrite: x   Leuk Esterase: x / RBC: x / WBC x   Sq Epi: x / Non Sq Epi: x / Bacteria: x    Urinalysis Basic - ( 29 Apr 2024 06:00 )    Color: x / Appearance: x / SG: x / pH: x  Gluc: 98 mg/dL / Ketone: x  / Bili: x / Urobili: x   Blood: x / Protein: x / Nitrite: x   Leuk Esterase: x / RBC: x / WBC x   Sq Epi: x / Non Sq Epi: x / Bacteria: x        RADIOLOGY & ADDITIONAL TESTS:    Consultant(s) Notes Reviewed:  [x ] YES  [ ] NO  Care Discussed with Consultants/Other Providers [ x] YES  [ ] NO  Imaging Personally Reviewed:  [x ] YES  [ ] NO    a/p:    #Hematoma s/p evacuation  #weakness, debility  #skin infection  - s/p hematoma evacuation w/ vascular on 4/5 c/b multiorganism infection s/p wash out on 4/19  - s/p zosyn completed on 4/25 per ID recommendation  - s/p Plastics consult for wound eval - recommended outpatient appt  - comprehensive rehab for weakness, walks with walker  - pain management    #  CVA  - continue aspirin 81mg, statin    # ADHD  - home med: Vyvanse 50mg daily    # Seizure disorder  - Vimpat 150mg BID    # Vertigo  - Meclizine prn     # HLD  - Lipitor 20mg

## 2024-04-30 NOTE — PROGRESS NOTE ADULT - SUBJECTIVE AND OBJECTIVE BOX
HPI:  Patient is a 52 yo M with hx of CVA with residual expressive aphasia, bilateral DVTs on chronic Lovenox SQ (150Qd), compartment syndrome status post left fasciotomy (L below the knee), vertigo, seizure disorder who presented to [facility] on 4/4/24 for atraumatic left thigh swelling. CTA demonstrated a large hematoma in the left thigh w/ a small hematoma of the RLE. Patient s/p hematoma evacuation by vascular on 4/5. plastic surgery consulted and recommending outpatient follow up. IR consulted but no drainage offered. Patient s/p OR w/ vascular on 4/19 for washout after found to have multiorganism skin infection.  Patient had wound vac placed on 4/26 by wound team. Patient completed IV zosyn on 4/25 for skin infection. Patient seen by rehab team and recommended for acute inpatient rehabilitation. Patient discharged to [facility] on 4/27 after medically stabilized and optimized.     Allergies  No Known Allergies    Subjective:  NAD in chair, night uneventful, afebrile. No seizures reported, tolerating Vimpat.  Sleeping well, no new complaints  Pain is well controlled  LBM (04/27), voiding without issues   On Lovenox DVT ppx dose  Wound care team to see today for VAC  RLE paresthesias reported-Lidocaine to left foot.   Case discussed at IDT meeting today for progress and discharge plan     ROS:  Denies CP, palpitation, cough, SOB, fever, chills, headache, dizziness, abdominal pain, dysuria, (+) joint pain      MEDICATIONS  (STANDING):  aspirin  chewable 81 milliGRAM(s) Oral daily  atorvastatin 20 milliGRAM(s) Oral at bedtime  enoxaparin Injectable 40 milliGRAM(s) SubCutaneous every 24 hours  gabapentin 300 milliGRAM(s) Oral three times a day  lacosamide 150 milliGRAM(s) Oral two times a day  melatonin 3 milliGRAM(s) Oral at bedtime  multivitamin 1 Tablet(s) Oral daily  polyethylene glycol 3350 17 Gram(s) Oral daily  senna 2 Tablet(s) Oral at bedtime  tamsulosin 0.4 milliGRAM(s) Oral at bedtime    MEDICATIONS  (PRN):  acetaminophen     Tablet .. 650 milliGRAM(s) Oral every 6 hours PRN Mild Pain (1 - 3)  meclizine 12.5 milliGRAM(s) Oral three times a day PRN Dizziness  oxyCODONE    IR 5 milliGRAM(s) Oral every 6 hours PRN Moderate Pain (4 - 6)  oxyCODONE    IR 10 milliGRAM(s) Oral every 6 hours PRN Severe Pain (7 - 10)      Recent Labs:                       10.7   6.05  )-----------( 268      ( 29 Apr 2024 06:00 )             33.2     04-29    143  |  107  |  25<H>  ----------------------------<  98  4.1   |  28  |  1.02    Ca    8.4      29 Apr 2024 06:00    TPro  6.2  /  Alb  2.6<L>  /  TBili  0.2  /  DBili  x   /  AST  12  /  ALT  19  /  AlkPhos  71  04-29      Physical Exam:   Vital Signs Last 24 Hrs  T(C): 36.6 (30 Apr 2024 08:20), Max: 36.6 (30 Apr 2024 08:20)  T(F): 97.8 (30 Apr 2024 08:20), Max: 97.8 (30 Apr 2024 08:20)  HR: 59 (30 Apr 2024 08:20) (59 - 60)  BP: 98/60 (30 Apr 2024 08:20) (98/60 - 110/70)  RR: 16 (30 Apr 2024 08:20) (15 - 16)  SpO2: 99% (30 Apr 2024 08:20) (98% - 99%)  Parameters below as of 30 Apr 2024 08:20  Patient On (Oxygen Delivery Method): room air    Gen - NAD, Comfortable  Neck - Supple, No limited ROM  Pulm - CTAB,  No crackles  Cardiovascular - RRR  Abdomen - Soft, NT, ND, +BS  Extremities - No C/C/E, No calf tenderness, Left foot DF is limited   Neuro-     Cognitive - AAOx3, Anomia and wards finding difficulty is improving, (+) cognitive deficits with memory      Motor -                     LEFT    UE - ShAB 5/5, EF 5/5, EE 5/5, WE 5/5,  5/5                    RIGHT UE - ShAB 5/5, EF 5/5, EE 5/5, WE 5/5,  5/5                    LEFT    LE - HF 4/5, KE 4/5, DF 3/5, PF 4/5                    RIGHT LE - HF 4/5, KE 4/5, DF 4/5, PF 4/5        Sensory - (+) numbness on the LLE from ankle joint and all over the foot   Psychiatric - Mood stable, Affect WNL  Skin:  Wound Measures 10.5cm x4.0cm x1.4cm L thigh, old fasciotomy site present with wound Vac , Stage 1 nonblanchable pressure redness on the right ankle      Continue comprehensive acute rehab program consisting of 3hrs/day of OT/PT and SLP.

## 2024-05-01 PROCEDURE — 99232 SBSQ HOSP IP/OBS MODERATE 35: CPT | Mod: GC

## 2024-05-01 PROCEDURE — 99232 SBSQ HOSP IP/OBS MODERATE 35: CPT

## 2024-05-01 RX ADMIN — OXYCODONE HYDROCHLORIDE 5 MILLIGRAM(S): 5 TABLET ORAL at 09:30

## 2024-05-01 RX ADMIN — OXYCODONE HYDROCHLORIDE 10 MILLIGRAM(S): 5 TABLET ORAL at 15:40

## 2024-05-01 RX ADMIN — Medication 650 MILLIGRAM(S): at 05:42

## 2024-05-01 RX ADMIN — LACOSAMIDE 150 MILLIGRAM(S): 50 TABLET ORAL at 17:21

## 2024-05-01 RX ADMIN — LIDOCAINE 1 PATCH: 4 CREAM TOPICAL at 05:41

## 2024-05-01 RX ADMIN — ATORVASTATIN CALCIUM 20 MILLIGRAM(S): 80 TABLET, FILM COATED ORAL at 21:37

## 2024-05-01 RX ADMIN — GABAPENTIN 300 MILLIGRAM(S): 400 CAPSULE ORAL at 05:42

## 2024-05-01 RX ADMIN — OXYCODONE HYDROCHLORIDE 10 MILLIGRAM(S): 5 TABLET ORAL at 21:40

## 2024-05-01 RX ADMIN — Medication 1 TABLET(S): at 11:57

## 2024-05-01 RX ADMIN — ENOXAPARIN SODIUM 40 MILLIGRAM(S): 100 INJECTION SUBCUTANEOUS at 21:38

## 2024-05-01 RX ADMIN — SENNA PLUS 2 TABLET(S): 8.6 TABLET ORAL at 21:37

## 2024-05-01 RX ADMIN — OXYCODONE HYDROCHLORIDE 5 MILLIGRAM(S): 5 TABLET ORAL at 08:38

## 2024-05-01 RX ADMIN — LIDOCAINE 1 PATCH: 4 CREAM TOPICAL at 06:50

## 2024-05-01 RX ADMIN — GABAPENTIN 300 MILLIGRAM(S): 400 CAPSULE ORAL at 13:38

## 2024-05-01 RX ADMIN — Medication 650 MILLIGRAM(S): at 06:30

## 2024-05-01 RX ADMIN — LIDOCAINE 1 PATCH: 4 CREAM TOPICAL at 19:23

## 2024-05-01 RX ADMIN — Medication 81 MILLIGRAM(S): at 11:57

## 2024-05-01 RX ADMIN — OXYCODONE HYDROCHLORIDE 10 MILLIGRAM(S): 5 TABLET ORAL at 20:40

## 2024-05-01 RX ADMIN — GABAPENTIN 300 MILLIGRAM(S): 400 CAPSULE ORAL at 21:37

## 2024-05-01 RX ADMIN — LACOSAMIDE 150 MILLIGRAM(S): 50 TABLET ORAL at 05:42

## 2024-05-01 RX ADMIN — OXYCODONE HYDROCHLORIDE 10 MILLIGRAM(S): 5 TABLET ORAL at 14:40

## 2024-05-01 RX ADMIN — TAMSULOSIN HYDROCHLORIDE 0.4 MILLIGRAM(S): 0.4 CAPSULE ORAL at 21:37

## 2024-05-01 NOTE — ADVANCED PRACTICE NURSE CONSULT - RECOMMEDATIONS
VAC dressing changed by primary RN:  Wound cleansed with normal saline, patted dry.  Cavillon film barrier applied to periwound skin, allowed to dry.  Adaptic contact layer placed in wound bed, one piece of black foam cut to fit/fill wound bed.  Dressing sealed, using Tegaderm brand transparent dressing substituted for KCI drape (to possibly minimize erythematous reaction).   VAC set to -125 mm/Hg per orders & holding pressure.  Dressing collapsed with adequate seal.    Offload all bony prominences with Turn & Position as needed.  Offload heels by floating on pillow or with offloading booties at all times while in bed.   Nutritional support & hydration per Dietician's recommendations.     Next dressing change for Friday 5/3/24, monitor periwound erythema. If increased, use KCI drape, if decreased, continue to substitute Tegaderm.  
Wound cleansed with normal saline, patted dry.  Cavillon film barrier applied to periwound skin, allowed to dry.  Adaptic contact layer placed in wound bed, one piece of black foam cut to fit/fill wound bed.  Dressing sealed, VAC set to -125 mm/Hg per orders & holding pressure.  Dressing collapsed with adequate seal.    Offload all bony prominences with Turn & Position as needed.  Offload heels by floating on pillow or with offloading booties at all times while in bed.   Nutritional support & hydration per Dietician's recommendations.     Next dressing change for Wednesday 5/1/24

## 2024-05-01 NOTE — ADVANCED PRACTICE NURSE CONSULT - REASON FOR CONSULT
Assessment & Recommendations for Left thigh Wound  Evaluate for possible Wound VAC
Wound VAC Dressing Change

## 2024-05-01 NOTE — PROGRESS NOTE ADULT - SUBJECTIVE AND OBJECTIVE BOX
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 on 4/4/24 for atraumatic left thigh swelling. CTA demonstrated a large hematoma in the left thigh w/ a small hematoma of the RLE. Patient s/p hematoma evacuation by vascular on 4/5. plastic surgery consulted and recommending outpatient follow up. IR consulted but no drainage offered. Patient s/p OR w/ vascular on 4/19 for washout after found to have multiorganism skin infection.  Patient had wound vac placed on 4/26 by wound team. Patient completed IV zosyn on 4/25 for skin infection. Patient seen by rehab team and recommended for acute inpatient rehabilitation. Patient discharged to [facility] on 4/27 after medically stabilized and optimized.     Allergies  No Known Allergies    Subjective:  Patient was seen and examined at bedside, no overnight events   Sleeping well, no new complaints  Pain is well controlled  LBM (04/30), voiding without issues   Participating and tolerating daily therapy, motivated to get better     ROS:  Denies CP, palpitation, cough, SOB, fever, chills, headache, dizziness, abdominal pain, dysuria, (+) joint pain      MEDICATIONS  (STANDING):  aspirin  chewable 81 milliGRAM(s) Oral daily  atorvastatin 20 milliGRAM(s) Oral at bedtime  enoxaparin Injectable 40 milliGRAM(s) SubCutaneous every 24 hours  gabapentin 300 milliGRAM(s) Oral three times a day  lacosamide 150 milliGRAM(s) Oral two times a day  lidocaine   4% Patch 1 Patch Transdermal <User Schedule>  melatonin 3 milliGRAM(s) Oral at bedtime  multivitamin 1 Tablet(s) Oral daily  polyethylene glycol 3350 17 Gram(s) Oral daily  senna 2 Tablet(s) Oral at bedtime  tamsulosin 0.4 milliGRAM(s) Oral at bedtime    MEDICATIONS  (PRN):  acetaminophen     Tablet .. 650 milliGRAM(s) Oral every 6 hours PRN Mild Pain (1 - 3)  bisacodyl 5 milliGRAM(s) Oral every 12 hours PRN Constipation  meclizine 12.5 milliGRAM(s) Oral three times a day PRN Dizziness  oxyCODONE    IR 5 milliGRAM(s) Oral every 6 hours PRN Moderate Pain (4 - 6)  oxyCODONE    IR 10 milliGRAM(s) Oral every 6 hours PRN Severe Pain (7 - 10)      Recent Labs:                       10.7   6.05  )-----------( 268      ( 29 Apr 2024 06:00 )             33.2     04-29    143  |  107  |  25<H>  ----------------------------<  98  4.1   |  28  |  1.02    Ca    8.4      29 Apr 2024 06:00    TPro  6.2  /  Alb  2.6<L>  /  TBili  0.2  /  DBili  x   /  AST  12  /  ALT  19  /  AlkPhos  71  04-29      Physical Exam:   Vital Signs Last 24 Hrs  T(C): 36.4 (01 May 2024 07:29), Max: 36.8 (30 Apr 2024 21:19)  T(F): 97.6 (01 May 2024 07:29), Max: 98.2 (30 Apr 2024 21:19)  HR: 57 (01 May 2024 07:29) (57 - 60)  BP: 102/66 (01 May 2024 07:29) (102/66 - 108/72)  RR: 16 (01 May 2024 07:29) (15 - 16)  SpO2: 98% (01 May 2024 07:29) (98% - 99%)  Parameters below as of 01 May 2024 07:29  Patient On (Oxygen Delivery Method): room air      Gen - NAD, Comfortable  Neck - Supple, No limited ROM  Pulm - CTAB,  No crackles  Cardiovascular - RRR  Abdomen - Soft, NT, ND, +BS  Extremities - No C/C/E, No calf tenderness, Left foot DF is limited   Neuro-     Cognitive - AAOx3, Anomia and wards finding difficulty is improving, (+) cognitive deficits with memory      Motor -                     LEFT    UE - ShAB 5/5, EF 5/5, EE 5/5, WE 5/5,  5/5                    RIGHT UE - ShAB 5/5, EF 5/5, EE 5/5, WE 5/5,  5/5                    LEFT    LE - HF 4/5, KE 4/5, DF 3/5, PF 4/5                    RIGHT LE - HF 4/5, KE 4/5, DF 4/5, PF 4/5        Sensory - (+) numbness on the LLE from ankle joint and all over the foot   Psychiatric - Mood stable, Affect WNL  Skin:  Wound Measures 10.5cm x4.0cm x1.4cm L thigh, old fasciotomy site present with wound Vac , Stage 1 nonblanchable pressure redness on the right ankle      Continue comprehensive acute rehab program consisting of 3hrs/day of OT/PT and SLP.

## 2024-05-01 NOTE — PROGRESS NOTE ADULT - ASSESSMENT
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  Patient now admitted for a multidisciplinary rehab program. 04-27    Hematoma s/p evacuation  Comprehensive Multidisciplinary Rehab Program:  - comprehensive rehab program of PT/OT/SLP - 3 hours a day, 5 days a week.   - s/p hematoma evacuation w/ vascular on 4/5  - plastics consulted for wound eval and recommended outpatient appt  - IR consulted but no intervention recommended  -Vascular w/ washout on 4/19 for skin infection  -wound vac placed on 4/26- wound care F/U   -aspirin 81mg  -cleared for DVT ppx w/Lovenox-only.   -Resume AC for CVA ppx per Vascular plan     Skin Infection  -surgical swab w/ multiorganism infection  -s/p zosyn, completed abx on 4/25    Seizure disorder  -Vimpat 150mg BID    ADHD  -Vyvanse 50mg daily     Vertigo  -Meclizine prn     HLD  - Lipitor 20mg    Pain  - Neurontin-?Titrate up for left foot pain  - Lidocaine patch to foot  - Tylenol PRN and oxycodone prn  - Avoid sedating medications that may interfere with cognitive recovery    GI / Bowel  - Senna qHS  - Miralax Daily    Skin / Pressure injury  - Skin assessment on admission performed: No pressure injury  - Monitor Incisions:  wound vac and incision on the left thigh  - Turn q2 hours in bed while awake, air mattress  - nursing to monitor skin qShift  - soft heel protectors  - skin barrier cream PRN    DVT prophylaxis:   - Lovenox  - Last Doppler on 4/6 -> negative for B/L LE DVTs      Outpatient Follow-up:  UROLOGY [address] [facility]  Scheduled Appointment: 05/13/2024    [facility]  UROLOGY [address]  Scheduled Appointment: 05/13/2024    Rodolfo Johnson  Internal Medicine  [address] [place] [address], [address] [place] [address]  Phone: [phone]  Fax: [phone]  Follow Up Time:     Mariah Christie  Vascular Surgery  [address] [facility], [address]  [place] [address]  Phone: [phone]  Fax: [phone]  Follow Up Time:

## 2024-05-01 NOTE — ADVANCED PRACTICE NURSE CONSULT - ASSESSMENT
Patient admitted to Acute Rehab Unit after hospitalization for Hematoma evacuation c/b skin infection.  Patient is A&Ox4, with some residual aphasia from prior CVA, seen at bedside, NP Monica in to see wound as well.  Presents with surgical wound to Left medial Upper thigh, 10.5 x 4 x 1.4 cm.  Wound bed is red, granular, scant serosanguinous drainage noted on prior dressing.   No necrotic tissue seen in wound bed.  Periwound with firm induration to about 5 cm around. The periwound skin with rashy erythema, consistent with contact dermatitis.  
Patient admitted to Acute Rehab Unit after hospitalization for Hematoma evacuation c/b skin infection.  Patient is A&Ox4, with some residual aphasia from prior CVA, seen at bedside, with Primary RN.  Left medial Upper thigh surgical wound VAC dressing taken down by primary RN.  Wound bed is red, granular, with some bleeding noted, No necrotic tissue seen in wound bed.  Scant sanguinous drainage in VAC canister, <100 mL.  Periwound continues to have firm induration to about 5 cm around.   The periwound skin with erythema, to about 2-3 cm.

## 2024-05-01 NOTE — PROGRESS NOTE ADULT - SUBJECTIVE AND OBJECTIVE BOX
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 CTA demonstrated a large hematoma in the left thigh w/ a small hematoma of the RLE. Patient s/p hematoma evacuation by vascular on 4/5. plastic surgery consulted and recommending outpatient follow up. IR consulted but no drainage offered. Patient s/p OR w/ vascular on 4/19 for washout after found to have multiorganism skin infection.  Patient had wound vac placed on 4/26 by wound team. Patient completed IV zosyn on 4/25 for skin infection. Patient seen by rehab team and recommended for acute inpatient rehabilitation. Patient discharged to [facility] on 4/27 after medically stabilized and optimized.     No acute events overnight. no new complaints.    T(C): 36.6 (04-30-24 @ 08:20), Max: 36.6 (04-30-24 @ 08:20)  T(F): 97.8 (04-30-24 @ 08:20), Max: 97.8 (04-30-24 @ 08:20)  HR: 59 (04-30-24 @ 08:20) (59 - 60)  BP: 98/60 (04-30-24 @ 08:20) (98/60 - 110/70)  ABP: --  ABP(mean): --  RR: 16 (04-30-24 @ 08:20) (15 - 16)  SpO2: 99% (04-30-24 @ 08:20) (98% - 99%)    on exam aaox3, no apparant distress  both lungs clear  s1, s2, regular  abdomen- soft  no pedal edema  left lower leg with surgical scar on medial side  left medial thigh- wound with dressing - clean and dry, no surrounding warmth/ erythema/ tenderness  can move all extremities.      MEDICATIONS  (STANDING):  aspirin  chewable 81 milliGRAM(s) Oral daily  atorvastatin 20 milliGRAM(s) Oral at bedtime  enoxaparin Injectable 40 milliGRAM(s) SubCutaneous every 24 hours  gabapentin 300 milliGRAM(s) Oral three times a day  lacosamide 150 milliGRAM(s) Oral two times a day  lidocaine   4% Patch 1 Patch Transdermal <User Schedule>  melatonin 3 milliGRAM(s) Oral at bedtime  multivitamin 1 Tablet(s) Oral daily  polyethylene glycol 3350 17 Gram(s) Oral daily  senna 2 Tablet(s) Oral at bedtime  tamsulosin 0.4 milliGRAM(s) Oral at bedtime    MEDICATIONS  (PRN):  acetaminophen     Tablet .. 650 milliGRAM(s) Oral every 6 hours PRN Mild Pain (1 - 3)  bisacodyl 5 milliGRAM(s) Oral every 12 hours PRN Constipation  meclizine 12.5 milliGRAM(s) Oral three times a day PRN Dizziness  oxyCODONE    IR 5 milliGRAM(s) Oral every 6 hours PRN Moderate Pain (4 - 6)  oxyCODONE    IR 10 milliGRAM(s) Oral every 6 hours PRN Severe Pain (7 - 10)    LABS:                        10.7   6.05  )-----------( 268      ( 29 Apr 2024 06:00 )             33.2     04-29    143  |  107  |  25<H>  ----------------------------<  98  4.1   |  28  |  1.02    Ca    8.4      29 Apr 2024 06:00    TPro  6.2  /  Alb  2.6<L>  /  TBili  0.2  /  DBili  x   /  AST  12  /  ALT  19  /  AlkPhos  71  04-29      Urinalysis Basic - ( 29 Apr 2024 06:00 )    Color: x / Appearance: x / SG: x / pH: x  Gluc: 98 mg/dL / Ketone: x  / Bili: x / Urobili: x   Blood: x / Protein: x / Nitrite: x   Leuk Esterase: x / RBC: x / WBC x   Sq Epi: x / Non Sq Epi: x / Bacteria: x    Urinalysis Basic - ( 29 Apr 2024 06:00 )    Color: x / Appearance: x / SG: x / pH: x  Gluc: 98 mg/dL / Ketone: x  / Bili: x / Urobili: x   Blood: x / Protein: x / Nitrite: x   Leuk Esterase: x / RBC: x / WBC x   Sq Epi: x / Non Sq Epi: x / Bacteria: x        RADIOLOGY & ADDITIONAL TESTS:    Consultant(s) Notes Reviewed:  [x ] YES  [ ] NO  Care Discussed with Consultants/Other Providers [ x] YES  [ ] NO  Imaging Personally Reviewed:  [x ] YES  [ ] NO    a/p:    #Hematoma s/p evacuation  #weakness, debility  #skin infection  - s/p hematoma evacuation w/ vascular on 4/5 c/b multiorganism infection s/p wash out on 4/19  - s/p zosyn completed on 4/25 per ID recommendation  - s/p Plastics consult for wound eval - recommended outpatient appt  - comprehensive rehab for weakness, walks with walker  - pain management    #  CVA  - continue aspirin 81mg, statin    # ADHD  - home med: Vyvanse 50mg daily    # Seizure disorder  - Vimpat 150mg BID    # Vertigo  - Meclizine prn     # HLD  - Lipitor 20mg

## 2024-05-02 LAB
ALBUMIN SERPL ELPH-MCNC: 2.7 G/DL — LOW (ref 3.3–5)
ALP SERPL-CCNC: 77 U/L — SIGNIFICANT CHANGE UP (ref 40–120)
ALT FLD-CCNC: 25 U/L — SIGNIFICANT CHANGE UP (ref 10–45)
ANION GAP SERPL CALC-SCNC: 7 MMOL/L — SIGNIFICANT CHANGE UP (ref 5–17)
APPEARANCE UR: CLEAR — SIGNIFICANT CHANGE UP
AST SERPL-CCNC: 13 U/L — SIGNIFICANT CHANGE UP (ref 10–40)
BASE EXCESS BLDA CALC-SCNC: 2.1 MMOL/L — SIGNIFICANT CHANGE UP (ref -2–3)
BASOPHILS # BLD AUTO: 0.1 K/UL — SIGNIFICANT CHANGE UP (ref 0–0.2)
BASOPHILS NFR BLD AUTO: 1.5 % — SIGNIFICANT CHANGE UP (ref 0–2)
BILIRUB SERPL-MCNC: 0.2 MG/DL — SIGNIFICANT CHANGE UP (ref 0.2–1.2)
BILIRUB UR-MCNC: NEGATIVE — SIGNIFICANT CHANGE UP
BLOOD GAS COMMENTS ARTERIAL: SIGNIFICANT CHANGE UP
BUN SERPL-MCNC: 25 MG/DL — HIGH (ref 7–23)
CALCIUM SERPL-MCNC: 8.4 MG/DL — SIGNIFICANT CHANGE UP (ref 8.4–10.5)
CHLORIDE SERPL-SCNC: 107 MMOL/L — SIGNIFICANT CHANGE UP (ref 96–108)
CO2 BLDA-SCNC: 28 MMOL/L — HIGH (ref 19–24)
CO2 SERPL-SCNC: 28 MMOL/L — SIGNIFICANT CHANGE UP (ref 22–31)
COLOR SPEC: YELLOW — SIGNIFICANT CHANGE UP
CREAT SERPL-MCNC: 1.02 MG/DL — SIGNIFICANT CHANGE UP (ref 0.5–1.3)
DIFF PNL FLD: NEGATIVE — SIGNIFICANT CHANGE UP
EGFR: 87 ML/MIN/1.73M2 — SIGNIFICANT CHANGE UP
EOSINOPHIL # BLD AUTO: 0.49 K/UL — SIGNIFICANT CHANGE UP (ref 0–0.5)
EOSINOPHIL NFR BLD AUTO: 7.4 % — HIGH (ref 0–6)
GAS PNL BLDA: SIGNIFICANT CHANGE UP
GLUCOSE SERPL-MCNC: 104 MG/DL — HIGH (ref 70–99)
GLUCOSE UR QL: NEGATIVE MG/DL — SIGNIFICANT CHANGE UP
HCO3 BLDA-SCNC: 27 MMOL/L — SIGNIFICANT CHANGE UP (ref 21–28)
HCT VFR BLD CALC: 35.5 % — LOW (ref 39–50)
HGB BLD-MCNC: 11.4 G/DL — LOW (ref 13–17)
HOROWITZ INDEX BLDA+IHG-RTO: 21 — SIGNIFICANT CHANGE UP
IMM GRANULOCYTES NFR BLD AUTO: 0.8 % — SIGNIFICANT CHANGE UP (ref 0–0.9)
KETONES UR-MCNC: NEGATIVE MG/DL — SIGNIFICANT CHANGE UP
LEUKOCYTE ESTERASE UR-ACNC: NEGATIVE — SIGNIFICANT CHANGE UP
LYMPHOCYTES # BLD AUTO: 1.56 K/UL — SIGNIFICANT CHANGE UP (ref 1–3.3)
LYMPHOCYTES # BLD AUTO: 23.4 % — SIGNIFICANT CHANGE UP (ref 13–44)
MCHC RBC-ENTMCNC: 28.2 PG — SIGNIFICANT CHANGE UP (ref 27–34)
MCHC RBC-ENTMCNC: 32.1 GM/DL — SIGNIFICANT CHANGE UP (ref 32–36)
MCV RBC AUTO: 87.9 FL — SIGNIFICANT CHANGE UP (ref 80–100)
MONOCYTES # BLD AUTO: 0.74 K/UL — SIGNIFICANT CHANGE UP (ref 0–0.9)
MONOCYTES NFR BLD AUTO: 11.1 % — SIGNIFICANT CHANGE UP (ref 2–14)
NEUTROPHILS # BLD AUTO: 3.72 K/UL — SIGNIFICANT CHANGE UP (ref 1.8–7.4)
NEUTROPHILS NFR BLD AUTO: 55.8 % — SIGNIFICANT CHANGE UP (ref 43–77)
NITRITE UR-MCNC: NEGATIVE — SIGNIFICANT CHANGE UP
NRBC # BLD: 0 /100 WBCS — SIGNIFICANT CHANGE UP (ref 0–0)
PCO2 BLDA: 41 MMHG — SIGNIFICANT CHANGE UP (ref 35–48)
PH BLDA: 7.42 — SIGNIFICANT CHANGE UP (ref 7.35–7.45)
PH UR: 6 — SIGNIFICANT CHANGE UP (ref 5–8)
PLATELET # BLD AUTO: 229 K/UL — SIGNIFICANT CHANGE UP (ref 150–400)
PO2 BLDA: 87 MMHG — SIGNIFICANT CHANGE UP (ref 83–108)
POTASSIUM SERPL-MCNC: 4.2 MMOL/L — SIGNIFICANT CHANGE UP (ref 3.5–5.3)
POTASSIUM SERPL-SCNC: 4.2 MMOL/L — SIGNIFICANT CHANGE UP (ref 3.5–5.3)
PROT SERPL-MCNC: 6.5 G/DL — SIGNIFICANT CHANGE UP (ref 6–8.3)
PROT UR-MCNC: NEGATIVE MG/DL — SIGNIFICANT CHANGE UP
RBC # BLD: 4.04 M/UL — LOW (ref 4.2–5.8)
RBC # FLD: 14.2 % — SIGNIFICANT CHANGE UP (ref 10.3–14.5)
SAO2 % BLDA: 98.4 % — HIGH (ref 94–98)
SODIUM SERPL-SCNC: 142 MMOL/L — SIGNIFICANT CHANGE UP (ref 135–145)
SP GR SPEC: 1.02 — SIGNIFICANT CHANGE UP (ref 1–1.03)
UROBILINOGEN FLD QL: 0.2 MG/DL — SIGNIFICANT CHANGE UP (ref 0.2–1)
WBC # BLD: 6.66 K/UL — SIGNIFICANT CHANGE UP (ref 3.8–10.5)
WBC # FLD AUTO: 6.66 K/UL — SIGNIFICANT CHANGE UP (ref 3.8–10.5)

## 2024-05-02 PROCEDURE — 70450 CT HEAD/BRAIN W/O DYE: CPT | Mod: 26

## 2024-05-02 PROCEDURE — 99232 SBSQ HOSP IP/OBS MODERATE 35: CPT | Mod: GC

## 2024-05-02 RX ORDER — OXYCODONE HYDROCHLORIDE 5 MG/1
5 TABLET ORAL ONCE
Refills: 0 | Status: DISCONTINUED | OUTPATIENT
Start: 2024-05-02 | End: 2024-05-02

## 2024-05-02 RX ORDER — ACETAMINOPHEN 500 MG
975 TABLET ORAL EVERY 6 HOURS
Refills: 0 | Status: DISCONTINUED | OUTPATIENT
Start: 2024-05-02 | End: 2024-05-10

## 2024-05-02 RX ORDER — OXYCODONE HYDROCHLORIDE 5 MG/1
5 TABLET ORAL EVERY 6 HOURS
Refills: 0 | Status: DISCONTINUED | OUTPATIENT
Start: 2024-05-02 | End: 2024-05-09

## 2024-05-02 RX ORDER — TRAMADOL HYDROCHLORIDE 50 MG/1
50 TABLET ORAL EVERY 4 HOURS
Refills: 0 | Status: DISCONTINUED | OUTPATIENT
Start: 2024-05-02 | End: 2024-05-08

## 2024-05-02 RX ORDER — OXYCODONE HYDROCHLORIDE 5 MG/1
10 TABLET ORAL EVERY 6 HOURS
Refills: 0 | Status: DISCONTINUED | OUTPATIENT
Start: 2024-05-02 | End: 2024-05-09

## 2024-05-02 RX ADMIN — TAMSULOSIN HYDROCHLORIDE 0.4 MILLIGRAM(S): 0.4 CAPSULE ORAL at 21:05

## 2024-05-02 RX ADMIN — Medication 975 MILLIGRAM(S): at 17:55

## 2024-05-02 RX ADMIN — ATORVASTATIN CALCIUM 20 MILLIGRAM(S): 80 TABLET, FILM COATED ORAL at 21:06

## 2024-05-02 RX ADMIN — LIDOCAINE 1 PATCH: 4 CREAM TOPICAL at 18:10

## 2024-05-02 RX ADMIN — LACOSAMIDE 150 MILLIGRAM(S): 50 TABLET ORAL at 17:54

## 2024-05-02 RX ADMIN — Medication 975 MILLIGRAM(S): at 18:55

## 2024-05-02 RX ADMIN — LIDOCAINE 1 PATCH: 4 CREAM TOPICAL at 07:07

## 2024-05-02 RX ADMIN — OXYCODONE HYDROCHLORIDE 5 MILLIGRAM(S): 5 TABLET ORAL at 08:14

## 2024-05-02 RX ADMIN — GABAPENTIN 300 MILLIGRAM(S): 400 CAPSULE ORAL at 14:11

## 2024-05-02 RX ADMIN — GABAPENTIN 300 MILLIGRAM(S): 400 CAPSULE ORAL at 21:06

## 2024-05-02 RX ADMIN — OXYCODONE HYDROCHLORIDE 5 MILLIGRAM(S): 5 TABLET ORAL at 09:10

## 2024-05-02 RX ADMIN — Medication 5 MILLIGRAM(S): at 21:05

## 2024-05-02 RX ADMIN — Medication 3 MILLIGRAM(S): at 21:05

## 2024-05-02 RX ADMIN — OXYCODONE HYDROCHLORIDE 5 MILLIGRAM(S): 5 TABLET ORAL at 12:04

## 2024-05-02 RX ADMIN — LIDOCAINE 1 PATCH: 4 CREAM TOPICAL at 06:10

## 2024-05-02 RX ADMIN — GABAPENTIN 300 MILLIGRAM(S): 400 CAPSULE ORAL at 05:53

## 2024-05-02 RX ADMIN — Medication 975 MILLIGRAM(S): at 23:53

## 2024-05-02 RX ADMIN — SENNA PLUS 2 TABLET(S): 8.6 TABLET ORAL at 21:05

## 2024-05-02 RX ADMIN — OXYCODONE HYDROCHLORIDE 5 MILLIGRAM(S): 5 TABLET ORAL at 13:00

## 2024-05-02 RX ADMIN — LACOSAMIDE 150 MILLIGRAM(S): 50 TABLET ORAL at 05:53

## 2024-05-02 RX ADMIN — ENOXAPARIN SODIUM 40 MILLIGRAM(S): 100 INJECTION SUBCUTANEOUS at 21:05

## 2024-05-02 NOTE — PROVIDER CONTACT NOTE (OTHER) - ASSESSMENT
Pt aox4 with expressive aphasia, pt denies noticing changes in his speech. pt denies chest pain. VSS T: 98.3, BP: 101/73, O2 sat: 96% on room air at 16 breaths per minute.

## 2024-05-02 NOTE — PROVIDER CONTACT NOTE (OTHER) - REASON
Pt's emergency contact Winter Conti states "I noticed his speech has got worse" the last couple days i've visited.

## 2024-05-02 NOTE — PROVIDER CONTACT NOTE (OTHER) - ACTION/TREATMENT ORDERED:
ABGs and CT head ordered and performed, AM CMP and CBC with diff ordered. No further MD orders at this time.

## 2024-05-02 NOTE — PROGRESS NOTE ADULT - ASSESSMENT
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  Patient now admitted for a multidisciplinary rehab program. 04-27    Hematoma s/p evacuation  Comprehensive Multidisciplinary Rehab Program:  - comprehensive rehab program of PT/OT/SLP - 3 hours a day, 5 days a week.   - s/p hematoma evacuation w/ vascular on 4/5  - plastics consulted for wound eval and recommended outpatient appt  - IR consulted but no intervention recommended  -Vascular w/ washout on 4/19 for skin infection  -wound vac placed on 4/26- wound care F/U   -aspirin 81mg  -cleared for DVT ppx w/Lovenox-only.   -Resume AC for CVA ppx per Vascular plan     Worsening Apraxia/AMS  - CTH, no acute findings (5/2)  - urinalysis pending  - appears sleepy but timing wise likely associated with pain medications    Skin Infection  -surgical swab w/ multiorganism infection  -s/p zosyn, completed abx on 4/25    Seizure disorder  -Vimpat 150mg BID    ADHD  -Vyvanse 50mg daily     Vertigo  -Meclizine prn     HLD  - Lipitor 20mg    Pain  - Neurontin-?Titrate up for left foot pain  - Lidocaine patch to foot  - Tylenol PRN and oxycodone prn  - Avoid sedating medications that may interfere with cognitive recovery    GI / Bowel  - Senna qHS  - Miralax Daily    Skin / Pressure injury  - Skin assessment on admission performed: No pressure injury  - Monitor Incisions:  wound vac and incision on the left thigh  - Turn q2 hours in bed while awake, air mattress  - nursing to monitor skin qShift  - soft heel protectors  - skin barrier cream PRN    DVT prophylaxis:   - Lovenox  - Last Doppler on 4/6 -> negative for B/L LE DVTs      Outpatient Follow-up:  UROLOGY [address] [facility]  Scheduled Appointment: 05/13/2024    [facility]  UROLOGY [address] [facility]  Scheduled Appointment: 05/13/2024    Rodolfo Johnson  Internal Medicine  [address] [address]  Phone: [phone]  Fax: [phone]  Follow Up Time:     Mariah Christie  Vascular Surgery  [address] [facility], [address]  [place] [address]  Phone: [phone]  Fax: [phone]  Follow Up Time:     Assessment/Plan:  TAMI DUNHAM is a 53y with hx of CVA w/ residual expressive aphasia, 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  Patient now admitted for a multidisciplinary rehab program. 04-27    Hematoma s/p evacuation  Comprehensive Multidisciplinary Rehab Program:  - comprehensive rehab program of PT/OT/SLP - 3 hours a day, 5 days a week.   - s/p hematoma evacuation w/ vascular on 4/5  - plastics consulted for wound eval and recommended outpatient appt  - IR consulted but no intervention recommended  -Vascular w/ washout on 4/19 for skin infection  -wound vac placed on 4/26- wound care F/U   -aspirin 81mg  -cleared for DVT ppx w/Lovenox-only.   -Resume AC for CVA ppx per Vascular plan     Worsening Apraxia/AMS  - CTH, no acute findings (5/2)  - urinalysis negative  - appears sleepy but timing wise likely associated with pain medications. Also Discussed with medicine, Dr. Luu. Regimen adjusted.     Skin Infection  -surgical swab w/ multiorganism infection  -s/p zosyn, completed abx on 4/25    Seizure disorder  -Vimpat 150mg BID    ADHD  -Vyvanse 50mg daily     Vertigo  -Meclizine prn     HLD  - Lipitor 20mg    Pain  - Neurontin-?Titrate up for left foot pain  - Lidocaine patch to foot  - Tylenol PRN and oxycodone prn  - Avoid sedating medications that may interfere with cognitive recovery    GI / Bowel  - Senna qHS  - Miralax Daily    Skin / Pressure injury  - Skin assessment on admission performed: No pressure injury  - Monitor Incisions:  wound vac and incision on the left thigh  - Turn q2 hours in bed while awake, air mattress  - nursing to monitor skin qShift  - soft heel protectors  - skin barrier cream PRN    DVT prophylaxis:   - Lovenox  - Last Doppler on 4/6 -> negative for B/L LE DVTs      Outpatient Follow-up:  UROLOGY [address] [place] [address]  Scheduled Appointment: 05/13/2024    [facility]  UROLOGY [address] [place] [address]  Scheduled Appointment: 05/13/2024    Rodolfo Johnson  Internal Medicine  [address] [address]  Phone: [phone]  Fax: [phone]  Follow Up Time:     Mariah Christie  Vascular Surgery  [address] [facility], [address]  [place] [address]  Phone: [phone]  Fax: [phone]  Follow Up Time:

## 2024-05-02 NOTE — PROGRESS NOTE ADULT - SUBJECTIVE AND OBJECTIVE BOX
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 CTA demonstrated a large hematoma in the left thigh w/ a small hematoma of the RLE. Patient s/p hematoma evacuation by vascular on 4/5. plastic surgery consulted and recommending outpatient follow up. IR consulted but no drainage offered. Patient s/p OR w/ vascular on 4/19 for washout after found to have multiorganism skin infection.  Patient had wound vac placed on 4/26 by wound team. Patient completed IV zosyn on 4/25 for skin infection. Patient seen by rehab team and recommended for acute inpatient rehabilitation. Patient discharged to [facility] on 4/27 after medically stabilized and optimized.     Allergies  No Known Allergies    Subjective:  Patient was seen and examined at bedside. Very sleepy, slow processing.   Sleeping well, no new complaints  Patient reports moderate pain with little improvement from meds, reports sedation from pain meds  LBM (04/30), voiding without issues   Participating and tolerating daily therapy, motivated to get better     ROS:  Denies CP, palpitation, cough, SOB, fever, chills, headache, dizziness, abdominal pain, dysuria, (+) joint pain      MEDICATIONS  (STANDING):  aspirin  chewable 81 milliGRAM(s) Oral daily  atorvastatin 20 milliGRAM(s) Oral at bedtime  bisacodyl 5 milliGRAM(s) Oral at bedtime  enoxaparin Injectable 40 milliGRAM(s) SubCutaneous every 24 hours  gabapentin 300 milliGRAM(s) Oral three times a day  lacosamide 150 milliGRAM(s) Oral two times a day  lidocaine   4% Patch 1 Patch Transdermal <User Schedule>  melatonin 3 milliGRAM(s) Oral at bedtime  multivitamin 1 Tablet(s) Oral daily  polyethylene glycol 3350 17 Gram(s) Oral daily  senna 2 Tablet(s) Oral at bedtime  tamsulosin 0.4 milliGRAM(s) Oral at bedtime    MEDICATIONS  (PRN):  acetaminophen     Tablet .. 650 milliGRAM(s) Oral every 6 hours PRN Mild Pain (1 - 3)  bisacodyl 5 milliGRAM(s) Oral every 12 hours PRN Constipation  meclizine 12.5 milliGRAM(s) Oral three times a day PRN Dizziness  oxyCODONE    IR 5 milliGRAM(s) Oral every 6 hours PRN Moderate Pain (4 - 6)  oxyCODONE    IR 10 milliGRAM(s) Oral every 6 hours PRN Severe Pain (7 - 10)      Recent Labs:                           11.4   6.66  )-----------( 229      ( 02 May 2024 05:45 )             35.5     05-02    142  |  107  |  25<H>  ----------------------------<  104<H>  4.2   |  28  |  1.02    Ca    8.4      02 May 2024 05:45    TPro  6.5  /  Alb  2.7<L>  /  TBili  0.2  /  DBili  x   /  AST  13  /  ALT  25  /  AlkPhos  77  05-02      Urinalysis Basic - ( 02 May 2024 05:45 )    Color: x / Appearance: x / SG: x / pH: x  Gluc: 104 mg/dL / Ketone: x  / Bili: x / Urobili: x   Blood: x / Protein: x / Nitrite: x   Leuk Esterase: x / RBC: x / WBC x   Sq Epi: x / Non Sq Epi: x / Bacteria: x      CAPILLARY BLOOD GLUCOSE      Physical Exam:   Vital Signs Last 24 Hrs  T(C): 36.6 (02 May 2024 08:09), Max: 36.8 (02 May 2024 00:00)  T(F): 97.9 (02 May 2024 08:09), Max: 98.3 (02 May 2024 00:00)  HR: 64 (02 May 2024 14:12) (64 - 79)  BP: 110/75 (02 May 2024 14:12) (91/63 - 110/75)  RR: 16 (02 May 2024 14:12) (16 - 16)  SpO2: 99% (02 May 2024 14:12) (94% - 99%)    Parameters below as of 02 May 2024 14:12  Patient On (Oxygen Delivery Method): room air      Gen - NAD, Comfortable  Neck - Supple, No limited ROM  Pulm - CTAB,  No crackles  Cardiovascular - RRR  Abdomen - Soft, NT, ND, +BS  Extremities - No C/C/E, No calf tenderness, Left foot DF is limited   Neuro-     Cognitive - AAOx3, Anomia and wards finding difficulty is improving, (+) cognitive deficits with memory      Motor -                     LEFT    UE - ShAB 5/5, EF 5/5, EE 5/5, WE 5/5,  5/5                    RIGHT UE - ShAB 5/5, EF 5/5, EE 5/5, WE 5/5,  5/5                    LEFT    LE - HF 4/5, KE 4/5, DF 3/5, PF 4/5                    RIGHT LE - HF 4/5, KE 4/5, DF 4/5, PF 4/5        Sensory - (+) numbness on the LLE from ankle joint and all over the foot   Psychiatric - Mood stable, Affect WNL  Skin:  Wound Measures 10.5cm x4.0cm x1.4cm L thigh, old fasciotomy site present with wound Vac , Stage 1 nonblanchable pressure redness on the right ankle      Continue comprehensive acute rehab program consisting of 3hrs/day of OT/PT and SLP. 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 CTA demonstrated a large hematoma in the left thigh w/ a small hematoma of the RLE. Patient s/p hematoma evacuation by vascular on 4/5. plastic surgery consulted and recommending outpatient follow up. IR consulted but no drainage offered. Patient s/p OR w/ vascular on 4/19 for washout after found to have multiorganism skin infection.  Patient had wound vac placed on 4/26 by wound team. Patient completed IV zosyn on 4/25 for skin infection. Patient seen by rehab team and recommended for acute inpatient rehabilitation. Patient discharged to [facility] on 4/27 after medically stabilized and optimized.     Allergies  No Known Allergies    Subjective:  Patient was seen and examined at bedside. Very sleepy, slow processing.   Sleeping well, no new complaints  Patient reports moderate pain with little improvement from meds, reports sedation from pain meds  LBM (04/30), voiding without issues   Participating and tolerating daily therapy, motivated to get better     ROS:  Denies CP, palpitation, cough, SOB, fever, chills, headache, dizziness, abdominal pain, dysuria, (+) joint pain      MEDICATIONS  (STANDING):  aspirin  chewable 81 milliGRAM(s) Oral daily  atorvastatin 20 milliGRAM(s) Oral at bedtime  bisacodyl 5 milliGRAM(s) Oral at bedtime  enoxaparin Injectable 40 milliGRAM(s) SubCutaneous every 24 hours  gabapentin 300 milliGRAM(s) Oral three times a day  lacosamide 150 milliGRAM(s) Oral two times a day  lidocaine   4% Patch 1 Patch Transdermal <User Schedule>  melatonin 3 milliGRAM(s) Oral at bedtime  multivitamin 1 Tablet(s) Oral daily  polyethylene glycol 3350 17 Gram(s) Oral daily  senna 2 Tablet(s) Oral at bedtime  tamsulosin 0.4 milliGRAM(s) Oral at bedtime    MEDICATIONS  (PRN):  acetaminophen     Tablet .. 650 milliGRAM(s) Oral every 6 hours PRN Mild Pain (1 - 3)  bisacodyl 5 milliGRAM(s) Oral every 12 hours PRN Constipation  meclizine 12.5 milliGRAM(s) Oral three times a day PRN Dizziness  oxyCODONE    IR 5 milliGRAM(s) Oral every 6 hours PRN Moderate Pain (4 - 6)  oxyCODONE    IR 10 milliGRAM(s) Oral every 6 hours PRN Severe Pain (7 - 10)      Recent Labs:                       11.4   6.66  )-----------( 229      ( 02 May 2024 05:45 )             35.5     05-02    142  |  107  |  25<H>  ----------------------------<  104<H>  4.2   |  28  |  1.02    Ca    8.4      02 May 2024 05:45    TPro  6.5  /  Alb  2.7<L>  /  TBili  0.2  /  DBili  x   /  AST  13  /  ALT  25  /  AlkPhos  77  05-02      Physical Exam:   Vital Signs Last 24 Hrs  T(C): 36.6 (02 May 2024 08:09), Max: 36.8 (02 May 2024 00:00)  T(F): 97.9 (02 May 2024 08:09), Max: 98.3 (02 May 2024 00:00)  HR: 64 (02 May 2024 14:12) (64 - 79)  BP: 110/75 (02 May 2024 14:12) (91/63 - 110/75)  RR: 16 (02 May 2024 14:12) (16 - 16)  SpO2: 99% (02 May 2024 14:12) (94% - 99%)  Parameters below as of 02 May 2024 14:12  Patient On (Oxygen Delivery Method): room air      Gen - NAD, Comfortable  Neck - Supple, No limited ROM  Pulm - CTAB,  No crackles  Cardiovascular - RRR  Abdomen - Soft, NT, ND, +BS  Extremities - No C/C/E, No calf tenderness, Left foot DF is limited   Neuro-     Cognitive - AAOx3, Anomia and wards finding difficulty is improving, (+) cognitive deficits with memory      Motor -                     LEFT    UE - ShAB 5/5, EF 5/5, EE 5/5, WE 5/5,  5/5                    RIGHT UE - ShAB 5/5, EF 5/5, EE 5/5, WE 5/5,  5/5                    LEFT    LE - HF 4/5, KE 4/5, DF 3/5, PF 4/5                    RIGHT LE - HF 4/5, KE 4/5, DF 4/5, PF 4/5        Sensory - (+) numbness on the LLE from ankle joint and all over the foot   Psychiatric - Mood stable, Affect WNL  Skin:  Wound Measures 10.5cm x4.0cm x1.4cm L thigh, old fasciotomy site present with wound Vac , Stage 1 nonblanchable pressure redness on the right ankle      Continue comprehensive acute rehab program consisting of 3hrs/day of OT/PT and SLP.

## 2024-05-03 PROCEDURE — 99232 SBSQ HOSP IP/OBS MODERATE 35: CPT | Mod: GC

## 2024-05-03 PROCEDURE — 99233 SBSQ HOSP IP/OBS HIGH 50: CPT

## 2024-05-03 RX ADMIN — SENNA PLUS 2 TABLET(S): 8.6 TABLET ORAL at 21:50

## 2024-05-03 RX ADMIN — ATORVASTATIN CALCIUM 20 MILLIGRAM(S): 80 TABLET, FILM COATED ORAL at 21:50

## 2024-05-03 RX ADMIN — LACOSAMIDE 150 MILLIGRAM(S): 50 TABLET ORAL at 18:03

## 2024-05-03 RX ADMIN — LIDOCAINE 1 PATCH: 4 CREAM TOPICAL at 06:53

## 2024-05-03 RX ADMIN — Medication 81 MILLIGRAM(S): at 13:09

## 2024-05-03 RX ADMIN — ENOXAPARIN SODIUM 40 MILLIGRAM(S): 100 INJECTION SUBCUTANEOUS at 21:49

## 2024-05-03 RX ADMIN — Medication 3 MILLIGRAM(S): at 21:50

## 2024-05-03 RX ADMIN — Medication 975 MILLIGRAM(S): at 18:08

## 2024-05-03 RX ADMIN — OXYCODONE HYDROCHLORIDE 10 MILLIGRAM(S): 5 TABLET ORAL at 20:55

## 2024-05-03 RX ADMIN — POLYETHYLENE GLYCOL 3350 17 GRAM(S): 17 POWDER, FOR SOLUTION ORAL at 13:09

## 2024-05-03 RX ADMIN — OXYCODONE HYDROCHLORIDE 10 MILLIGRAM(S): 5 TABLET ORAL at 19:51

## 2024-05-03 RX ADMIN — Medication 975 MILLIGRAM(S): at 13:09

## 2024-05-03 RX ADMIN — Medication 975 MILLIGRAM(S): at 19:05

## 2024-05-03 RX ADMIN — GABAPENTIN 300 MILLIGRAM(S): 400 CAPSULE ORAL at 21:50

## 2024-05-03 RX ADMIN — Medication 975 MILLIGRAM(S): at 14:10

## 2024-05-03 RX ADMIN — GABAPENTIN 300 MILLIGRAM(S): 400 CAPSULE ORAL at 05:28

## 2024-05-03 RX ADMIN — Medication 975 MILLIGRAM(S): at 05:28

## 2024-05-03 RX ADMIN — GABAPENTIN 300 MILLIGRAM(S): 400 CAPSULE ORAL at 13:09

## 2024-05-03 RX ADMIN — LACOSAMIDE 150 MILLIGRAM(S): 50 TABLET ORAL at 05:28

## 2024-05-03 RX ADMIN — Medication 975 MILLIGRAM(S): at 00:56

## 2024-05-03 RX ADMIN — TAMSULOSIN HYDROCHLORIDE 0.4 MILLIGRAM(S): 0.4 CAPSULE ORAL at 21:52

## 2024-05-03 RX ADMIN — Medication 1 TABLET(S): at 13:09

## 2024-05-03 NOTE — PROGRESS NOTE ADULT - SUBJECTIVE AND OBJECTIVE BOX
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 CTA demonstrated a large hematoma in the left thigh w/ a small hematoma of the RLE. Patient s/p hematoma evacuation by vascular on 4/5. plastic surgery consulted and recommending outpatient follow up. IR consulted but no drainage offered. Patient s/p OR w/ vascular on 4/19 for washout after found to have multiorganism skin infection.  Patient had wound vac placed on 4/26 by wound team. Patient completed IV zosyn on 4/25 for skin infection. Patient seen by rehab team and recommended for acute inpatient rehabilitation. Patient discharged to [place] on 4/27 after medically stabilized and optimized.     Allergies  No Known Allergies    Subjective:  Patient was seen and examined at bedside.  No overnight events    Sleeping well, no new complaints, happy  Pain is better controlled   LBM (05/03), voiding without issues   Participating and tolerating daily therapy, motivated to get better     ROS:  Denies CP, palpitation, cough, SOB, fever, chills, headache, dizziness, abdominal pain, dysuria, (+) joint pain      MEDICATIONS  (STANDING):  aspirin  chewable 81 milliGRAM(s) Oral daily  atorvastatin 20 milliGRAM(s) Oral at bedtime  bisacodyl 5 milliGRAM(s) Oral at bedtime  enoxaparin Injectable 40 milliGRAM(s) SubCutaneous every 24 hours  gabapentin 300 milliGRAM(s) Oral three times a day  lacosamide 150 milliGRAM(s) Oral two times a day  lidocaine   4% Patch 1 Patch Transdermal <User Schedule>  melatonin 3 milliGRAM(s) Oral at bedtime  multivitamin 1 Tablet(s) Oral daily  polyethylene glycol 3350 17 Gram(s) Oral daily  senna 2 Tablet(s) Oral at bedtime  tamsulosin 0.4 milliGRAM(s) Oral at bedtime    MEDICATIONS  (PRN):  acetaminophen     Tablet .. 650 milliGRAM(s) Oral every 6 hours PRN Mild Pain (1 - 3)  bisacodyl 5 milliGRAM(s) Oral every 12 hours PRN Constipation  meclizine 12.5 milliGRAM(s) Oral three times a day PRN Dizziness  oxyCODONE    IR 5 milliGRAM(s) Oral every 6 hours PRN Moderate Pain (4 - 6)  oxyCODONE    IR 10 milliGRAM(s) Oral every 6 hours PRN Severe Pain (7 - 10)      Recent Labs:                       11.4   6.66  )-----------( 229      ( 02 May 2024 05:45 )             35.5     05-02    142  |  107  |  25<H>  ----------------------------<  104<H>  4.2   |  28  |  1.02    Ca    8.4      02 May 2024 05:45    TPro  6.5  /  Alb  2.7<L>  /  TBili  0.2  /  DBili  x   /  AST  13  /  ALT  25  /  AlkPhos  77  05-02      Physical Exam:   Vital Signs Last 24 Hrs  T(C): 36.4 (03 May 2024 08:21), Max: 36.4 (03 May 2024 08:21)  T(F): 97.6 (03 May 2024 08:21), Max: 97.6 (03 May 2024 08:21)  HR: 58 (03 May 2024 08:21) (58 - 64)  BP: 122/83 (03 May 2024 08:21) (100/65 - 122/83)  RR: 16 (03 May 2024 08:21) (16 - 16)  SpO2: 100% (03 May 2024 08:21) (99% - 100%)  Parameters below as of 03 May 2024 08:21  Patient On (Oxygen Delivery Method): room air      Gen - NAD, Comfortable  Neck - Supple, No limited ROM  Pulm - CTAB,  No crackles  Cardiovascular - RRR  Abdomen - Soft, NT, ND, +BS  Extremities - No C/C/E, No calf tenderness, Left foot DF is limited   Neuro-     Cognitive - AAOx3, Anomia and wards finding difficulty is improving, (+) cognitive deficits with memory      Motor -                     LEFT    UE - ShAB 5/5, EF 5/5, EE 5/5, WE 5/5,  5/5                    RIGHT UE - ShAB 5/5, EF 5/5, EE 5/5, WE 5/5,  5/5                    LEFT    LE - HF 4/5, KE 4/5, DF 3/5, PF 4/5                    RIGHT LE - HF 4/5, KE 4/5, DF 4/5, PF 4/5        Sensory - (+) numbness on the LLE from ankle joint and all over the foot   Psychiatric - Mood stable, Affect WNL  Skin:  Wound Measures 10.5cm x4.0cm x1.4cm L thigh, old fasciotomy site present with wound Vac , Stage 1 nonblanchable pressure redness on the right ankle      Continue comprehensive acute rehab program consisting of 3hrs/day of OT/PT and SLP.

## 2024-05-03 NOTE — CHART NOTE - NSCHARTNOTEFT_GEN_A_CORE
Nutrition Follow Up Note  Hospital Course   (Per Electronic Medical Record)    Source:  Patient [X]  Medical Record [X]      Diet:   Regular Diet (IDDSI Level 7) w/ Thin Liquids (IDDSI Level 0)  Tolerates Diet Consistency Well  No Chewing/Swallowing Difficulties  No Recent Nausea, Vomiting, Diarrhea & Some Recent Constipation (as Per Patient)  Consumes % of Meals (as Per Documentation) - States Good PO Intake/Appetite (Per Patient)  on Ensure Max 11oz PO Daily (Provides 150kcal & 30grams of Protein)  Patient Takes Nutrition Supplement Well  Education Provided on Proper Nutrition & Need for Increased Fluids/Fiber     Enteral/Parenteral Nutrition: Not Applicable    Current Weight: 199lb on 4/27  Obtain New Weight  Obtain Weights Weekly     Pertinent Medications: MEDICATIONS  (STANDING):  acetaminophen     Tablet .. 975 milliGRAM(s) Oral every 6 hours  aspirin  chewable 81 milliGRAM(s) Oral daily  atorvastatin 20 milliGRAM(s) Oral at bedtime  enoxaparin Injectable 40 milliGRAM(s) SubCutaneous every 24 hours  gabapentin 300 milliGRAM(s) Oral three times a day  lacosamide 150 milliGRAM(s) Oral two times a day  lidocaine   4% Patch 1 Patch Transdermal <User Schedule>  melatonin 3 milliGRAM(s) Oral at bedtime  multivitamin 1 Tablet(s) Oral daily  polyethylene glycol 3350 17 Gram(s) Oral daily  senna 2 Tablet(s) Oral at bedtime  tamsulosin 0.4 milliGRAM(s) Oral at bedtime    MEDICATIONS  (PRN):  bisacodyl 5 milliGRAM(s) Oral every 12 hours PRN Constipation  meclizine 12.5 milliGRAM(s) Oral three times a day PRN Dizziness  oxyCODONE    IR 10 milliGRAM(s) Oral every 6 hours PRN Severe Pain (7 - 10)  oxyCODONE    IR 5 milliGRAM(s) Oral every 6 hours PRN Moderate Pain (4 - 6)  traMADol 50 milliGRAM(s) Oral every 4 hours PRN Mild Pain (1 - 3)    Pertinent Labs:  05-02 Na142 mmol/L Glu 104 mg/dL<H> K+ 4.2 mmol/L Cr  1.02 mg/dL BUN 25 mg/dL<H> 05-02 Alb 2.7 g/dL<L>    Skin: Stage 1 Pressure Ulcer on Right Ankle    Edema: None Noted (as Per Documentation)     Last Bowel Movement: on 4/30    Estimated Needs:   [X] No Change Since Previous Assessment    Previous Nutrition Diagnosis:   Severe Malnutrition     Nutrition Diagnosis is [X] Ongoing - Education Provided on Proper Nutrition & Need for Increased Fluids/Fiber     New Nutrition Diagnosis: [X] Not Applicable    Interventions:   1. Education Provided on Proper Nutrition & Need for Increased Fluids/Fiber   2. Recommend Continue Nutrition Plan of Care     Monitoring & Evaluation:   [X] Weights   [X] PO Intake   [X] Skin Integrity   [X] Follow Up (Per Protocol)  [X] Tolerance to Diet Prescription   [X] Other: Labs     Registered Dietitian/Nutritionist Remains Available.  Tyson Barajas, RAMEZ, CDN    Phone# [phone]

## 2024-05-03 NOTE — PROGRESS NOTE ADULT - ASSESSMENT
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  Patient now admitted for a multidisciplinary rehab program. 04-27    Hematoma s/p evacuation  Comprehensive Multidisciplinary Rehab Program:  - comprehensive rehab program of PT/OT/SLP - 3 hours a day, 5 days a week.   - s/p hematoma evacuation w/ vascular on 4/5  - plastics consulted for wound eval and recommended outpatient appt  - IR consulted but no intervention recommended  -Vascular w/ washout on 4/19 for skin infection  -wound vac placed on 4/26- wound care F/U   -aspirin 81mg  -cleared for DVT ppx w/Lovenox-only.   -Resume AC for CVA ppx per Vascular plan     Worsening Apraxia/AMS  - CTH, no acute findings (5/2)  - urinalysis negative  - likely associated with pain medications     Skin Infection  -surgical swab w/ multiorganism infection  -s/p zosyn, completed abx on 4/25    Seizure disorder  -Vimpat 150mg BID    ADHD  -Vyvanse 50mg daily     Vertigo  -Meclizine prn     HLD  - Lipitor 20mg    Pain  - Neurontin- Titrate up for left foot pain  - Lidocaine patch to foot  - Tylenol PRN and oxycodone prn  - Avoid sedating medications that may interfere with cognitive recovery    GI / Bowel  - Senna qHS  - Miralax Daily    Skin / Pressure injury  - Skin assessment on admission performed: No pressure injury  - Monitor Incisions:  wound vac and incision on the left thigh  - Turn q2 hours in bed while awake, air mattress  - nursing to monitor skin qShift  - soft heel protectors  - skin barrier cream PRN    DVT prophylaxis:   - Lovenox  - Last Doppler on 4/6 -> negative for B/L LE DVTs      Outpatient Follow-up:  UROLOGY [address] [place] [address]  Scheduled Appointment: 05/13/2024    [facility]  UROLOGY [address] [place] [address]  Scheduled Appointment: 05/13/2024    Rodolfo Johnson  Internal Medicine  [address] [place] [address], [address] [place] [address]  Phone: [phone]  Fax: [phone]  Follow Up Time:     Mariah Christie  Vascular Surgery  [address] [place] [address], [address]  [place] [address]  Phone: [phone]  Fax: [phone]  Follow Up Time:

## 2024-05-03 NOTE — PROGRESS NOTE ADULT - SUBJECTIVE AND OBJECTIVE BOX
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 CTA demonstrated a large hematoma in the left thigh w/ a small hematoma of the RLE. Patient s/p hematoma evacuation by vascular on . plastic surgery consulted and recommending outpatient follow up. IR consulted but no drainage offered. Patient s/p OR w/ vascular on  for washout after found to have multiorganism skin infection.  Patient had wound vac placed on  by wound team. Patient completed IV zosyn on  for skin infection. Patient seen by rehab team and recommended for acute inpatient rehabilitation. Patient discharged to [place] on  after medically stabilized and optimized.     Yesterday's event noted.  Patient now back to baseline. denied any new complaints, no pain/ distress/ bowel/ bladder issues.     T(C): 36.4 (24 @ 08:21), Max: 36.4 (24 @ 08:21)  T(F): 97.6 (24 @ 08:21), Max: 97.6 (24 @ 08:21)  HR: 58 (24 @ 08:21) (58 - 64)  BP: 122/83 (24 @ 08:21) (100/65 - 122/83)  ABP: --  ABP(mean): --  RR: 16 (24 @ 08:21) (16 - 16)  SpO2: 100% (24 @ 08:21) (99% - 100%)      on exam aaox3, no apparant distress  both lungs clear  s1, s2, regular  abdomen- soft  no pedal edema  left lower leg with surgical scar on medial side  left medial thigh- wound with dressing - clean and dry, no surrounding warmth/ erythema/ tenderness  can move all extremities.    MEDICATIONS  (STANDING):  acetaminophen     Tablet .. 975 milliGRAM(s) Oral every 6 hours  aspirin  chewable 81 milliGRAM(s) Oral daily  atorvastatin 20 milliGRAM(s) Oral at bedtime  enoxaparin Injectable 40 milliGRAM(s) SubCutaneous every 24 hours  gabapentin 300 milliGRAM(s) Oral three times a day  lacosamide 150 milliGRAM(s) Oral two times a day  lidocaine   4% Patch 1 Patch Transdermal <User Schedule>  melatonin 3 milliGRAM(s) Oral at bedtime  multivitamin 1 Tablet(s) Oral daily  polyethylene glycol 3350 17 Gram(s) Oral daily  senna 2 Tablet(s) Oral at bedtime  tamsulosin 0.4 milliGRAM(s) Oral at bedtime    MEDICATIONS  (PRN):  bisacodyl 5 milliGRAM(s) Oral every 12 hours PRN Constipation  meclizine 12.5 milliGRAM(s) Oral three times a day PRN Dizziness  oxyCODONE    IR 10 milliGRAM(s) Oral every 6 hours PRN Severe Pain (7 - 10)  oxyCODONE    IR 5 milliGRAM(s) Oral every 6 hours PRN Moderate Pain (4 - 6)  traMADol 50 milliGRAM(s) Oral every 4 hours PRN Mild Pain (1 - 3)    LABS:                        11.4   6.66  )-----------( 229      ( 02 May 2024 05:45 )             35.5     05-02    142  |  107  |  25<H>  ----------------------------<  104<H>  4.2   |  28  |  1.02    Ca    8.4      02 May 2024 05:45    TPro  6.5  /  Alb  2.7<L>  /  TBili  0.2  /  DBili  x   /  AST  13  /  ALT  25  /  AlkPhos  77  05-02      Urinalysis Basic - ( 02 May 2024 14:45 )    Color: Yellow / Appearance: Clear / S.024 / pH: x  Gluc: x / Ketone: Negative mg/dL  / Bili: Negative / Urobili: 0.2 mg/dL   Blood: x / Protein: Negative mg/dL / Nitrite: Negative   Leuk Esterase: Negative / RBC: x / WBC x   Sq Epi: x / Non Sq Epi: x / Bacteria: x    ABG - ( 02 May 2024 00:10 )  pH, Arterial: 7.42  pH, Blood: x     /  pCO2: 41    /  pO2: 87    / HCO3: 27    / Base Excess: 2.1   /  SaO2: 98.4          Urinalysis Basic - ( 02 May 2024 14:45 )    Color: Yellow / Appearance: Clear / S.024 / pH: x  Gluc: x / Ketone: Negative mg/dL  / Bili: Negative / Urobili: 0.2 mg/dL   Blood: x / Protein: Negative mg/dL / Nitrite: Negative   Leuk Esterase: Negative / RBC: x / WBC x   Sq Epi: x / Non Sq Epi: x / Bacteria: x      a/p:    # Altered mental status, now resolved, likely sec to opiods. No evidence of acute infection, imaging neg for acute issues, lab work reviewed.   abg reviewed, no evidence of hypercapnia or hypoxemia.  - monitor for excessive sedation on oxycodone. taper off as possible.    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 CTA demonstrated a large hematoma in the left thigh w/ a small hematoma of the RLE. Patient s/p hematoma evacuation by vascular on . plastic surgery consulted and recommending outpatient follow up. IR consulted but no drainage offered. Patient s/p OR w/ vascular on  for washout after found to have multiorganism skin infection.  Patient had wound vac placed on  by wound team. Patient completed IV zosyn on  for skin infection. Patient seen by rehab team and recommended for acute inpatient rehabilitation. Patient discharged to [place] on  after medically stabilized and optimized.     Yesterday's event noted.  Patient now back to baseline. denied any new complaints, no pain/ distress/ bowel/ bladder issues.     T(C): 36.4 (24 @ 08:21), Max: 36.4 (24 @ 08:21)  T(F): 97.6 (24 @ 08:21), Max: 97.6 (24 @ 08:21)  HR: 58 (24 @ 08:21) (58 - 64)  BP: 122/83 (24 @ 08:21) (100/65 - 122/83)  ABP: --  ABP(mean): --  RR: 16 (24 @ 08:21) (16 - 16)  SpO2: 100% (24 @ 08:21) (99% - 100%)      on exam aaox3, no apparant distress  both lungs clear  s1, s2, regular  abdomen- soft  no pedal edema  left lower leg with surgical scar on medial side  left medial thigh- wound with dressing - clean and dry, no surrounding warmth/ erythema/ tenderness  can move all extremities.    MEDICATIONS  (STANDING):  acetaminophen     Tablet .. 975 milliGRAM(s) Oral every 6 hours  aspirin  chewable 81 milliGRAM(s) Oral daily  atorvastatin 20 milliGRAM(s) Oral at bedtime  enoxaparin Injectable 40 milliGRAM(s) SubCutaneous every 24 hours  gabapentin 300 milliGRAM(s) Oral three times a day  lacosamide 150 milliGRAM(s) Oral two times a day  lidocaine   4% Patch 1 Patch Transdermal <User Schedule>  melatonin 3 milliGRAM(s) Oral at bedtime  multivitamin 1 Tablet(s) Oral daily  polyethylene glycol 3350 17 Gram(s) Oral daily  senna 2 Tablet(s) Oral at bedtime  tamsulosin 0.4 milliGRAM(s) Oral at bedtime    MEDICATIONS  (PRN):  bisacodyl 5 milliGRAM(s) Oral every 12 hours PRN Constipation  meclizine 12.5 milliGRAM(s) Oral three times a day PRN Dizziness  oxyCODONE    IR 10 milliGRAM(s) Oral every 6 hours PRN Severe Pain (7 - 10)  oxyCODONE    IR 5 milliGRAM(s) Oral every 6 hours PRN Moderate Pain (4 - 6)  traMADol 50 milliGRAM(s) Oral every 4 hours PRN Mild Pain (1 - 3)    LABS:                        11.4   6.66  )-----------( 229      ( 02 May 2024 05:45 )             35.5     05-02    142  |  107  |  25<H>  ----------------------------<  104<H>  4.2   |  28  |  1.02    Ca    8.4      02 May 2024 05:45    TPro  6.5  /  Alb  2.7<L>  /  TBili  0.2  /  DBili  x   /  AST  13  /  ALT  25  /  AlkPhos  77  05-02      Urinalysis Basic - ( 02 May 2024 14:45 )    Color: Yellow / Appearance: Clear / S.024 / pH: x  Gluc: x / Ketone: Negative mg/dL  / Bili: Negative / Urobili: 0.2 mg/dL   Blood: x / Protein: Negative mg/dL / Nitrite: Negative   Leuk Esterase: Negative / RBC: x / WBC x   Sq Epi: x / Non Sq Epi: x / Bacteria: x    ABG - ( 02 May 2024 00:10 )  pH, Arterial: 7.42  pH, Blood: x     /  pCO2: 41    /  pO2: 87    / HCO3: 27    / Base Excess: 2.1   /  SaO2: 98.4          Urinalysis Basic - ( 02 May 2024 14:45 )    Color: Yellow / Appearance: Clear / S.024 / pH: x  Gluc: x / Ketone: Negative mg/dL  / Bili: Negative / Urobili: 0.2 mg/dL   Blood: x / Protein: Negative mg/dL / Nitrite: Negative   Leuk Esterase: Negative / RBC: x / WBC x   Sq Epi: x / Non Sq Epi: x / Bacteria: x      a/p:    # Altered mental status, likely sec to metabolic encephalopathy due to opiods, now resolved. No evidence of acute infection, imaging neg for acute issues, lab work reviewed.   abg reviewed, no evidence of hypercapnia or hypoxemia.  - monitor for excessive sedation on oxycodone. taper off as possible.    #Hematoma s/p evacuation  #weakness, debility  #skin infection  - s/p hematoma evacuation w/ vascular on  c/b multiorganism infection s/p wash out on   - s/p zosyn completed on  per ID recommendation  - s/p Plastics consult for wound eval - recommended outpatient appt  - comprehensive rehab for weakness, walks with walker  - pain management    #  CVA  - continue aspirin 81mg, statin    # ADHD  - home med: Vyvanse 50mg daily    # Seizure disorder  - Vimpat 150mg BID    # Vertigo  - Meclizine prn     # HLD  - Lipitor 20mg

## 2024-05-04 ENCOUNTER — TRANSCRIPTION ENCOUNTER (OUTPATIENT)
Age: 54
End: 2024-05-04

## 2024-05-04 PROCEDURE — 99232 SBSQ HOSP IP/OBS MODERATE 35: CPT

## 2024-05-04 PROCEDURE — 99232 SBSQ HOSP IP/OBS MODERATE 35: CPT | Mod: GC

## 2024-05-04 RX ORDER — GABAPENTIN 400 MG/1
300 CAPSULE ORAL ONCE
Refills: 0 | Status: COMPLETED | OUTPATIENT
Start: 2024-05-04 | End: 2024-05-04

## 2024-05-04 RX ADMIN — GABAPENTIN 300 MILLIGRAM(S): 400 CAPSULE ORAL at 05:32

## 2024-05-04 RX ADMIN — ATORVASTATIN CALCIUM 20 MILLIGRAM(S): 80 TABLET, FILM COATED ORAL at 21:47

## 2024-05-04 RX ADMIN — TAMSULOSIN HYDROCHLORIDE 0.4 MILLIGRAM(S): 0.4 CAPSULE ORAL at 21:47

## 2024-05-04 RX ADMIN — TRAMADOL HYDROCHLORIDE 50 MILLIGRAM(S): 50 TABLET ORAL at 15:15

## 2024-05-04 RX ADMIN — TRAMADOL HYDROCHLORIDE 50 MILLIGRAM(S): 50 TABLET ORAL at 14:43

## 2024-05-04 RX ADMIN — Medication 81 MILLIGRAM(S): at 12:40

## 2024-05-04 RX ADMIN — GABAPENTIN 300 MILLIGRAM(S): 400 CAPSULE ORAL at 21:46

## 2024-05-04 RX ADMIN — LACOSAMIDE 150 MILLIGRAM(S): 50 TABLET ORAL at 05:30

## 2024-05-04 RX ADMIN — LIDOCAINE 1 PATCH: 4 CREAM TOPICAL at 19:57

## 2024-05-04 RX ADMIN — Medication 975 MILLIGRAM(S): at 19:57

## 2024-05-04 RX ADMIN — ENOXAPARIN SODIUM 40 MILLIGRAM(S): 100 INJECTION SUBCUTANEOUS at 21:47

## 2024-05-04 RX ADMIN — Medication 3 MILLIGRAM(S): at 21:50

## 2024-05-04 RX ADMIN — Medication 975 MILLIGRAM(S): at 05:30

## 2024-05-04 RX ADMIN — LIDOCAINE 1 PATCH: 4 CREAM TOPICAL at 20:25

## 2024-05-04 RX ADMIN — Medication 975 MILLIGRAM(S): at 00:40

## 2024-05-04 RX ADMIN — Medication 1 TABLET(S): at 12:39

## 2024-05-04 RX ADMIN — Medication 975 MILLIGRAM(S): at 18:44

## 2024-05-04 RX ADMIN — LACOSAMIDE 150 MILLIGRAM(S): 50 TABLET ORAL at 18:43

## 2024-05-04 RX ADMIN — Medication 975 MILLIGRAM(S): at 01:31

## 2024-05-04 RX ADMIN — POLYETHYLENE GLYCOL 3350 17 GRAM(S): 17 POWDER, FOR SOLUTION ORAL at 12:46

## 2024-05-04 RX ADMIN — Medication 975 MILLIGRAM(S): at 06:51

## 2024-05-04 RX ADMIN — SENNA PLUS 2 TABLET(S): 8.6 TABLET ORAL at 21:47

## 2024-05-04 RX ADMIN — LIDOCAINE 1 PATCH: 4 CREAM TOPICAL at 08:31

## 2024-05-04 RX ADMIN — GABAPENTIN 300 MILLIGRAM(S): 400 CAPSULE ORAL at 14:41

## 2024-05-04 RX ADMIN — Medication 975 MILLIGRAM(S): at 12:39

## 2024-05-04 NOTE — DISCHARGE NOTE PROVIDER - NSDCMRMEDTOKEN_GEN_ALL_CORE_FT
aspirin 81 mg oral delayed release tablet: 1 tab(s) orally once a day  atorvastatin 20 mg oral tablet: 1 tab(s) orally once a day  Calcium tablet: 1 tablet orally once a day  gabapentin 300 mg oral tablet: orally every 8 hours  HYDROmorphone 2 mg oral tablet: 1 tab(s) orally every 6 hours As needed Severe Pain (7 - 10)  lacosamide 150 mg oral tablet: 1 tab(s) orally 2 times a day  Lovenox 40 mg/0.4 mL injectable solution: 40 milligram(s) subcutaneously once a day  meclizine 12.5 mg oral tablet: 1 tab(s) orally 3 times a day As needed Dizziness  Multiple Vitamins oral tablet: 1 tab(s) orally once a day  polyethylene glycol 3350 oral powder for reconstitution: 17 gram(s) orally once a day  senna leaf extract oral tablet: 2 tab(s) orally once a day (at bedtime)  tamsulosin 0.4 mg oral capsule: 1 cap(s) orally once a day  Vyvanse 50 mg oral capsule: 1 cap(s) orally once a day (in the morning)   acetaminophen 325 mg oral tablet: 3 tab(s) orally every 6 hours  aspirin 81 mg oral tablet, chewable: 1 tab(s) orally once a day MDD: 1  atorvastatin 20 mg oral tablet: 1 tab(s) orally once a day (at bedtime)  Calcium tablet: 1 tablet orally once a day  enoxaparin 40 mg/0.4 mL injectable solution: 40 milligram(s) subcutaneously every 24 hours MDD: 1  gabapentin 300 mg oral capsule: 1 cap(s) orally 3 times a day MDD: 3  lidocaine 4% topical film: Apply topically to affected area every 24 hours  Lovenox 40 mg/0.4 mL injectable solution: 40 milligram(s) subcutaneously once a day  meclizine 12.5 mg oral tablet: 1 tab(s) orally 3 times a day as needed for Dizziness  melatonin 3 mg oral tablet: 1 tab(s) orally once a day (at bedtime)  Multiple Vitamins oral tablet: 1 tab(s) orally once a day  polyethylene glycol 3350 oral powder for reconstitution: 17 gram(s) orally once a day  senna leaf extract oral tablet: 2 tab(s) orally once a day (at bedtime)  tamsulosin 0.4 mg oral capsule: 1 cap(s) orally once a day (at bedtime)  Vyvanse 50 mg oral capsule: 1 cap(s) orally once a day (in the morning)   acetaminophen 325 mg oral tablet: 3 tab(s) orally every 6 hours  aspirin 81 mg oral tablet, chewable: 1 tab(s) orally once a day MDD: 1  atorvastatin 20 mg oral tablet: 1 tab(s) orally once a day (at bedtime)  Calcium tablet: 1 tablet orally once a day  enoxaparin 40 mg/0.4 mL injectable solution: 40 milligram(s) subcutaneously every 24 hours MDD: 1  gabapentin 300 mg oral capsule: 1 cap(s) orally 3 times a day MDD: 3  lacosamide 150 mg oral tablet: 1 tab(s) orally 2 times a day MDD: 2  lidocaine 4% topical film: Apply topically to affected area every 24 hours  meclizine 12.5 mg oral tablet: 1 tab(s) orally 3 times a day as needed for Dizziness  melatonin 3 mg oral tablet: 1 tab(s) orally once a day (at bedtime)  Multiple Vitamins oral tablet: 1 tab(s) orally once a day  oxyCODONE 5 mg oral tablet: 1 tab(s) orally every 6 hours as needed for Moderate Pain (4 - 6) MDD: 4  polyethylene glycol 3350 oral powder for reconstitution: 17 gram(s) orally once a day  senna leaf extract oral tablet: 2 tab(s) orally once a day (at bedtime)  tamsulosin 0.4 mg oral capsule: 1 cap(s) orally once a day (at bedtime)  traMADol 50 mg oral tablet: 1 tab(s) orally every 4 hours as needed for Mild Pain (1 - 3) MDD: 6  Vyvanse 50 mg oral capsule: 1 cap(s) orally once a day (in the morning)

## 2024-05-04 NOTE — DISCHARGE NOTE PROVIDER - CARE PROVIDER_API CALL
Rodolfo Johnson  Internal Medicine  [address] [address]  Phone: [phone]  Fax: [phone]  Follow Up Time: 1 week   Rodolfo Johnson  Internal Medicine  [address] [address]  Phone: [phone]  Fax: [phone]  Follow Up Time: 1 week    Mariah Christie  Vascular Surgery  [address] [place] [address], [address]  [place] [address]  Phone: [phone]  Fax: [phone]  Follow Up Time: 2 weeks

## 2024-05-04 NOTE — DISCHARGE NOTE PROVIDER - HOSPITAL COURSE
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 CTA demonstrated a large hematoma in the left thigh w/ a small hematoma of the RLE. Patient s/p hematoma evacuation by vascular on 4/5. plastic surgery consulted and recommending outpatient follow up. IR consulted but no drainage offered. Patient s/p OR w/ vascular on 4/19 for washout after found to have multiorganism skin infection.  Patient had wound vac placed on 4/26 by wound team. Patient completed IV zosyn on 4/25 for skin infection. Patient seen by rehab team and recommended for acute inpatient rehabilitation. Patient discharged to [place] on 4/27 after medically stabilized and optimized.     Rehab course significant for Worsening Apraxia/AMS, CTH shows no acute findings (5/2), urinalysis negative, likely associated with pain medications.     Functional Status: ****      All other medical co-morbidities were stable. Patient tolerated course of inpatient PT/OT/SLP rehab with significant improvements and met rehab goals prior to discharge. Discharge instructions were discussed with patient and family. All questions answered and all concerns addressed. Patient was medically cleared for discharge to home.     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 CTA demonstrated a large hematoma in the left thigh w/ a small hematoma of the RLE. Patient s/p hematoma evacuation by vascular on 4/5. plastic surgery consulted and recommending outpatient follow up. IR consulted but no drainage offered. Patient s/p OR w/ vascular on 4/19 for washout after found to have multiorganism skin infection.  Patient had wound vac placed on 4/26 by wound team. Patient completed IV zosyn on 4/25 for skin infection. Patient seen by rehab team and recommended for acute inpatient rehabilitation. Patient discharged to [place] on 4/27 after medically stabilized and optimized.     Rehab course significant for Worsening Apraxia/AMS, CTH shows no acute findings (5/2), urinalysis negative, likely associated with pain medications.  Wound care consulted for assessment and management of wound vac    Functional Status:   PT  Mobility: currently Close Supervision for transfers  Stairs: negotiated 12 steps with HRs and Close Supervision  Bed Mobility: Modified [place]  Walking: patient ambulates 200 feet with RW and Close Supervision  OT  Toilet Transfers: Close Supervision  Tub/Shower transfer: Close Supervision  Lower Body Dressing: Close Supervision  SLP  Cognition: reduced processing time during structured task despite  limiting distractors  Communication: int. cues for word finding      All other medical co-morbidities were stable. Patient tolerated course of inpatient PT/OT/SLP rehab with significant improvements and met rehab goals prior to discharge. Discharge instructions were discussed with patient and family. All questions answered and all concerns addressed. Patient was medically cleared for discharge to home.     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 CTA demonstrated a large hematoma in the left thigh w/ a small hematoma of the RLE. Patient s/p hematoma evacuation by vascular on 4/5. plastic surgery consulted and recommending outpatient follow up. IR consulted but no drainage offered. Patient s/p OR w/ vascular on 4/19 for washout after found to have multiorganism skin infection.  Patient had wound vac placed on 4/26 by wound team. Patient completed IV zosyn on 4/25 for skin infection. Patient seen by rehab team and recommended for acute inpatient rehabilitation. Patient discharged to [place] on 4/27 after medically stabilized and optimized.     Rehab course significant for Worsening Apraxia/AMS, CTH shows no acute findings (5/2), urinalysis negative, likely associated with pain medications.  Wound care consulted for assessment and management of wound vac    Functional Status:   PT  Mobility: currently Close Supervision for transfers  Stairs: negotiated 12 steps with HRs and Close Supervision  Bed Mobility: Modified [place]  Walking: patient ambulates 200 feet with RW and Close Supervision  OT  Toilet Transfers: Close Supervision  Tub/Shower transfer: Close Supervision  Lower Body Dressing: Close Supervision  SLP  Cognition: reduced processing time during structured task despite  limiting distractors  Communication: int. cues for word finding      All other medical co-morbidities were stable. Patient tolerated course of inpatient PT/OT/SLP rehab with significant improvements and met rehab goals prior to discharge. Discharge instructions were discussed with patient and family. All questions answered and all concerns addressed. Patient was medically cleared for discharge to home.     Outpatient Follow-ups:    [facility]  UROLOGY [address] [place] [address]  Scheduled Appointment: 05/13/2024    Rodolfo Johnson  Internal Medicine  [address] [place] [address]  Phone: [phone]  Fax: [phone]  Follow Up Time:     Mariah Christie  Vascular Surgery  [address] [place] [address], [address]  [place] [address]  Phone: [phone]  Fax: [phone]  Follow Up Time:    Plastic Surgery

## 2024-05-04 NOTE — PROGRESS NOTE ADULT - ASSESSMENT
# Altered mental status, likely sec to metabolic encephalopathy due to opiods, now resolved. No evidence of acute infection, imaging neg for acute issues, lab work reviewed.   abg reviewed, no evidence of hypercapnia or hypoxemia.  - monitor for excessive sedation on oxycodone. taper off as possible.    #Hematoma s/p evacuation  #weakness, debility  #skin infection  - s/p hematoma evacuation w/ vascular on 4/5 c/b multiorganism infection s/p wash out on 4/19  - s/p zosyn completed on 4/25 per ID recommendation  - s/p Plastics consult for wound eval - recommended outpatient appt  - comprehensive rehab for weakness, walks with walker  - pain management    # CVA  - continue aspirin 81mg, statin    # ADHD  - home med: Vyvanse 50mg daily    # Seizure disorder  - Vimpat 150mg BID    # Vertigo  - Meclizine prn     # HLD  - Lipitor 20mg    # DVT ppx  - SCDs, no pharm ppx at this time      Case discussed in detail with physiatry team.

## 2024-05-04 NOTE — PROGRESS NOTE ADULT - SUBJECTIVE AND OBJECTIVE BOX
Patient is a 53y old  Male who presents with a chief complaint of Hematoma s/p evacuation c/b skin infection (03 May 2024 10:23)      Patient seen and examined at bedside.  Denies chest pain, dyspnea, abd pain.    ALLERGIES:  No Known Allergies    MEDICATIONS  (STANDING):  acetaminophen     Tablet .. 975 milliGRAM(s) Oral every 6 hours  aspirin  chewable 81 milliGRAM(s) Oral daily  atorvastatin 20 milliGRAM(s) Oral at bedtime  enoxaparin Injectable 40 milliGRAM(s) SubCutaneous every 24 hours  gabapentin 300 milliGRAM(s) Oral three times a day  lacosamide 150 milliGRAM(s) Oral two times a day  lidocaine   4% Patch 1 Patch Transdermal <User Schedule>  melatonin 3 milliGRAM(s) Oral at bedtime  multivitamin 1 Tablet(s) Oral daily  polyethylene glycol 3350 17 Gram(s) Oral daily  senna 2 Tablet(s) Oral at bedtime  tamsulosin 0.4 milliGRAM(s) Oral at bedtime    MEDICATIONS  (PRN):  bisacodyl 5 milliGRAM(s) Oral every 12 hours PRN Constipation  meclizine 12.5 milliGRAM(s) Oral three times a day PRN Dizziness  oxyCODONE    IR 10 milliGRAM(s) Oral every 6 hours PRN Severe Pain (7 - 10)  oxyCODONE    IR 5 milliGRAM(s) Oral every 6 hours PRN Moderate Pain (4 - 6)  traMADol 50 milliGRAM(s) Oral every 4 hours PRN Mild Pain (1 - 3)    Vital Signs Last 24 Hrs  T(F): 98 (04 May 2024 08:42), Max: 98 (04 May 2024 08:42)  HR: 62 (04 May 2024 08:42) (55 - 62)  BP: 112/72 (04 May 2024 08:42) (100/63 - 113/74)  RR: 16 (04 May 2024 08:42) (15 - 16)  SpO2: 97% (04 May 2024 08:42) (97% - 100%)  I&O's Summary    03 May 2024 07:01  -  04 May 2024 07:00  --------------------------------------------------------  IN: 0 mL / OUT: 1450 mL / NET: -1450 mL        PHYSICAL EXAM:  General: NAD, A/O x 3  ENT: MMM  Neck: Supple, No JVD  Lungs: Clear to auscultation bilaterally  Cardio: RRR, S1/S2, No murmurs  Abdomen: Soft, Nontender, Nondistended; Bowel sounds present  Extremities: No calf tenderness, No pitting edema    LABS:                        11.4   6.66  )-----------( 229      ( 02 May 2024 05:45 )             35.5       05-    142  |  107  |  25  ----------------------------<  104  4.2   |  28  |  1.02    Ca    8.4      02 May 2024 05:45    TPro  6.5  /  Alb  2.7  /  TBili  0.2  /  DBili  x   /  AST  13  /  ALT  25  /  AlkPhos  77  05-02                      ABG - ( 02 May 2024 00:10 )  pH, Arterial: 7.42  pH, Blood: x     /  pCO2: 41    /  pO2: 87    / HCO3: 27    / Base Excess: 2.1   /  SaO2: 98.4                        Urinalysis Basic - ( 02 May 2024 14:45 )    Color: Yellow / Appearance: Clear / S.024 / pH: x  Gluc: x / Ketone: Negative mg/dL  / Bili: Negative / Urobili: 0.2 mg/dL   Blood: x / Protein: Negative mg/dL / Nitrite: Negative   Leuk Esterase: Negative / RBC: x / WBC x   Sq Epi: x / Non Sq Epi: x / Bacteria: x            RADIOLOGY & ADDITIONAL TESTS:    Care Discussed with Consultants/Other Providers:

## 2024-05-04 NOTE — PROGRESS NOTE ADULT - SUBJECTIVE AND OBJECTIVE BOX
Patient is a 53y old  Male who presents with a chief complaint of Hematoma s/p evacuation c/b skin infection (04 May 2024 09:44)    ---------------------------------------------------------------------  SUBJECTIVE:  Seen and evaluated at bedside this AM. No acute issues overnight.     Other ROS:  Denies: headache, CP, SOB, pain, bowel or bladder issues  ----------------------------------------------------------------------  PHYSICAL EXAM:    Vital Signs Last 24 Hrs  T(C): 36.7 (04 May 2024 08:42), Max: 36.7 (04 May 2024 08:42)  T(F): 98 (04 May 2024 08:42), Max: 98 (04 May 2024 08:42)  HR: 62 (04 May 2024 08:42) (55 - 62)  BP: 112/72 (04 May 2024 08:42) (100/63 - 113/74)  BP(mean): --  RR: 16 (04 May 2024 08:42) (15 - 16)  SpO2: 97% (04 May 2024 08:42) (97% - 100%)    Parameters below as of 04 May 2024 08:42  Patient On (Oxygen Delivery Method): room air      Daily     Daily     PHYSICAL EXAM:    General - NAD, alert, follows commands  Heart- pulse regular  Lungs- breathing comfortably without respiratory distress  Abd- no visible abdominal distension   Ext- No calf pain, extremities well perfused  Neuro- Exam unchanged; moves all extremities against gravity  ----------------------------------------------------------------------  RECENT LABS:                CAPILLARY BLOOD GLUCOSE        ----------------------------------------------------------------------  RECENT IMAGING:    ***  ----------------------------------------------------------------------  MEDICATIONS:  MEDICATIONS  (STANDING):  acetaminophen     Tablet .. 975 milliGRAM(s) Oral every 6 hours  aspirin  chewable 81 milliGRAM(s) Oral daily  atorvastatin 20 milliGRAM(s) Oral at bedtime  enoxaparin Injectable 40 milliGRAM(s) SubCutaneous every 24 hours  gabapentin 300 milliGRAM(s) Oral three times a day  lacosamide 150 milliGRAM(s) Oral two times a day  lidocaine   4% Patch 1 Patch Transdermal <User Schedule>  melatonin 3 milliGRAM(s) Oral at bedtime  multivitamin 1 Tablet(s) Oral daily  polyethylene glycol 3350 17 Gram(s) Oral daily  senna 2 Tablet(s) Oral at bedtime  tamsulosin 0.4 milliGRAM(s) Oral at bedtime    MEDICATIONS  (PRN):  bisacodyl 5 milliGRAM(s) Oral every 12 hours PRN Constipation  meclizine 12.5 milliGRAM(s) Oral three times a day PRN Dizziness  oxyCODONE    IR 5 milliGRAM(s) Oral every 6 hours PRN Moderate Pain (4 - 6)  oxyCODONE    IR 10 milliGRAM(s) Oral every 6 hours PRN Severe Pain (7 - 10)  traMADol 50 milliGRAM(s) Oral every 4 hours PRN Mild Pain (1 - 3)    ----------------------------------------------------------------------

## 2024-05-04 NOTE — DISCHARGE NOTE PROVIDER - PROVIDER TOKENS
PROVIDER:[TOKEN:[[id number]],FOLLOWUP:[1 week]] PROVIDER:[TOKEN:[[id number]],FOLLOWUP:[1 week]],PROVIDER:[TOKEN:[[id number]],FOLLOWUP:[2 weeks]]

## 2024-05-04 NOTE — PROGRESS NOTE ADULT - ASSESSMENT
Imp: Patient with diagnosis of hematoma s/p evacuation admitted for comprehensive acute rehabilitation.    Plan:  - Chart reviewed  - Continue comprehensive rehabilitation program. Patient requires active and ongoing therapeutic interventions of multiple disciplines and can tolerate 3h/d of therapies. Can actively participate and benefit from an intensive rehabilitation program. Requires supervision of a rehabilitation physician and a coordinated interdisciplinary approach to providing rehabilitation.   - DVT prophylaxis- Lovenox  - Skin- Turn q2h, check skin daily  - Continue current medications  - Medical issues:    * Hematoma s/p evacuation complicated by wound infection - wound vac    * Pain - well controlled. Continue Neurontin Tylenol, and lidocaine patch  - Discussed with resident on call and interdisciplinary team

## 2024-05-04 NOTE — DISCHARGE NOTE PROVIDER - NSFOLLOWUPCLINICS_GEN_ALL_ED_FT
[facility]  Plastic Surgery  [address] [facility] [facility] [facility] [address]  [place] [address]  Phone: [phone]  Fax:

## 2024-05-04 NOTE — DISCHARGE NOTE PROVIDER - CARE PROVIDERS DIRECT ADDRESSES
,DirectAddress_Unknown ,DirectAddress_Unknown,[email or web address]

## 2024-05-04 NOTE — DISCHARGE NOTE PROVIDER - NSDCFUSCHEDAPPT_GEN_ALL_CORE_FT
[facility]  UROLOGY [address] [place] [address] R  Scheduled Appointment: 06/03/2024    Doni Walker  [facility]  UROLOGY [address] [place] [address] R  Scheduled Appointment: 06/03/2024

## 2024-05-04 NOTE — DISCHARGE NOTE PROVIDER - NSDCCPCAREPLAN_GEN_ALL_CORE_FT
PRINCIPAL DISCHARGE DIAGNOSIS  Diagnosis: S/P evacuation of hematoma  Assessment and Plan of Treatment: Left thigh hematoma evacuation w/ vascular on 4/5/24. Complicated by skin infection requiring washout on 4/19.  Wound vac placed on 4/26.  Deficitis in function of left leg have shown improvement during your rehab stay. Continue your therapies upon discharge. Take medications as prescribed in this document.  Follow-up with the physicians listed, including your surgeon.      SECONDARY DISCHARGE DIAGNOSES  Diagnosis: Seizure disorder  Assessment and Plan of Treatment: Continue vimpat.    Diagnosis: History of ADHD  Assessment and Plan of Treatment: Resume home Vyvanse 50mg daily    Diagnosis: History of stroke  Assessment and Plan of Treatment: Continue taking atorvastatin. Follow-up with your primary care provider.    Diagnosis: Vertigo  Assessment and Plan of Treatment: Meclizine as needed for dizziness up to 3 times a day. Follow-up with the physicians listed in this discharge document.     PRINCIPAL DISCHARGE DIAGNOSIS  Diagnosis: S/P evacuation of hematoma  Assessment and Plan of Treatment: Left thigh hematoma evacuation w/ vascular on 4/5/24. Complicated by skin infection requiring washout on 4/19.  Wound vac placed on 4/26.  Deficitis in function of left leg have shown improvement during your rehab stay. You will continue with therapies at home.  Please continue wet to dry dressings for your surgical wound upon discharge as educated on.  A visiting nurse will come to you home and place a wound vac on the surgical wound.  Please follow-up with Dr. Christie (vascular surgery) and plastic surgery for continued monitoring and further wound care.      SECONDARY DISCHARGE DIAGNOSES  Diagnosis: Seizure disorder  Assessment and Plan of Treatment: Continue vimpat.    Diagnosis: History of ADHD  Assessment and Plan of Treatment: Resume home Vyvanse 50mg daily    Diagnosis: History of stroke  Assessment and Plan of Treatment: Continue taking atorvastatin, Aspirin and Lovenox. Follow-up with your primary care provider., Dr. Johnson and hematology Dr. Fito Varela for further management of your blood thinner medications    Diagnosis: Vertigo  Assessment and Plan of Treatment: Meclizine as needed for dizziness up to 3 times a day. Follow-up with the physicians listed in this discharge document.

## 2024-05-05 PROCEDURE — 99232 SBSQ HOSP IP/OBS MODERATE 35: CPT

## 2024-05-05 PROCEDURE — 99232 SBSQ HOSP IP/OBS MODERATE 35: CPT | Mod: GC

## 2024-05-05 RX ADMIN — TRAMADOL HYDROCHLORIDE 50 MILLIGRAM(S): 50 TABLET ORAL at 21:24

## 2024-05-05 RX ADMIN — Medication 975 MILLIGRAM(S): at 13:30

## 2024-05-05 RX ADMIN — TRAMADOL HYDROCHLORIDE 50 MILLIGRAM(S): 50 TABLET ORAL at 22:40

## 2024-05-05 RX ADMIN — ENOXAPARIN SODIUM 40 MILLIGRAM(S): 100 INJECTION SUBCUTANEOUS at 21:24

## 2024-05-05 RX ADMIN — Medication 3 MILLIGRAM(S): at 21:24

## 2024-05-05 RX ADMIN — GABAPENTIN 300 MILLIGRAM(S): 400 CAPSULE ORAL at 21:25

## 2024-05-05 RX ADMIN — Medication 975 MILLIGRAM(S): at 17:52

## 2024-05-05 RX ADMIN — SENNA PLUS 2 TABLET(S): 8.6 TABLET ORAL at 21:24

## 2024-05-05 RX ADMIN — LACOSAMIDE 150 MILLIGRAM(S): 50 TABLET ORAL at 17:52

## 2024-05-05 RX ADMIN — GABAPENTIN 300 MILLIGRAM(S): 400 CAPSULE ORAL at 14:11

## 2024-05-05 RX ADMIN — TAMSULOSIN HYDROCHLORIDE 0.4 MILLIGRAM(S): 0.4 CAPSULE ORAL at 21:24

## 2024-05-05 RX ADMIN — Medication 81 MILLIGRAM(S): at 12:33

## 2024-05-05 RX ADMIN — Medication 1 TABLET(S): at 12:33

## 2024-05-05 RX ADMIN — Medication 975 MILLIGRAM(S): at 06:20

## 2024-05-05 RX ADMIN — LIDOCAINE 1 PATCH: 4 CREAM TOPICAL at 08:50

## 2024-05-05 RX ADMIN — LIDOCAINE 1 PATCH: 4 CREAM TOPICAL at 21:06

## 2024-05-05 RX ADMIN — GABAPENTIN 300 MILLIGRAM(S): 400 CAPSULE ORAL at 06:19

## 2024-05-05 RX ADMIN — LACOSAMIDE 150 MILLIGRAM(S): 50 TABLET ORAL at 06:20

## 2024-05-05 RX ADMIN — ATORVASTATIN CALCIUM 20 MILLIGRAM(S): 80 TABLET, FILM COATED ORAL at 21:24

## 2024-05-05 RX ADMIN — Medication 975 MILLIGRAM(S): at 18:52

## 2024-05-05 RX ADMIN — Medication 975 MILLIGRAM(S): at 12:33

## 2024-05-05 RX ADMIN — LIDOCAINE 1 PATCH: 4 CREAM TOPICAL at 19:44

## 2024-05-05 RX ADMIN — POLYETHYLENE GLYCOL 3350 17 GRAM(S): 17 POWDER, FOR SOLUTION ORAL at 12:40

## 2024-05-05 RX ADMIN — Medication 975 MILLIGRAM(S): at 07:03

## 2024-05-05 NOTE — PROGRESS NOTE ADULT - SUBJECTIVE AND OBJECTIVE BOX
Patient is a 53y old  Male who presents with a chief complaint of Hematoma s/p evacuation c/b skin infection (04 May 2024 18:12)      Patient seen and examined at bedside.  Denies chest pain, dyspnea, abd pain.    ALLERGIES:  No Known Allergies    MEDICATIONS  (STANDING):  acetaminophen     Tablet .. 975 milliGRAM(s) Oral every 6 hours  aspirin  chewable 81 milliGRAM(s) Oral daily  atorvastatin 20 milliGRAM(s) Oral at bedtime  enoxaparin Injectable 40 milliGRAM(s) SubCutaneous every 24 hours  gabapentin 300 milliGRAM(s) Oral three times a day  lacosamide 150 milliGRAM(s) Oral two times a day  lidocaine   4% Patch 1 Patch Transdermal <User Schedule>  melatonin 3 milliGRAM(s) Oral at bedtime  multivitamin 1 Tablet(s) Oral daily  polyethylene glycol 3350 17 Gram(s) Oral daily  senna 2 Tablet(s) Oral at bedtime  tamsulosin 0.4 milliGRAM(s) Oral at bedtime    MEDICATIONS  (PRN):  bisacodyl 5 milliGRAM(s) Oral every 12 hours PRN Constipation  meclizine 12.5 milliGRAM(s) Oral three times a day PRN Dizziness  oxyCODONE    IR 10 milliGRAM(s) Oral every 6 hours PRN Severe Pain (7 - 10)  oxyCODONE    IR 5 milliGRAM(s) Oral every 6 hours PRN Moderate Pain (4 - 6)  traMADol 50 milliGRAM(s) Oral every 4 hours PRN Mild Pain (1 - 3)    Vital Signs Last 24 Hrs  T(F): 97.6 (05 May 2024 08:55), Max: 98.3 (04 May 2024 21:45)  HR: 73 (05 May 2024 08:55) (64 - 73)  BP: 97/60 (05 May 2024 08:55) (97/60 - 118/70)  RR: 16 (05 May 2024 08:55) (16 - 18)  SpO2: 95% (05 May 2024 08:55) (95% - 97%)  I&O's Summary      PHYSICAL EXAM:  General: NAD, A/O x 3  ENT: MMM  Neck: Supple, No JVD  Lungs: Clear to auscultation bilaterally  Cardio: RRR, S1/S2, No murmurs  Abdomen: Soft, Nontender, Nondistended; Bowel sounds present  Extremities: No calf tenderness, No pitting edema    LABS:                                          Urinalysis Basic - ( 02 May 2024 14:45 )    Color: Yellow / Appearance: Clear / S.024 / pH: x  Gluc: x / Ketone: Negative mg/dL  / Bili: Negative / Urobili: 0.2 mg/dL   Blood: x / Protein: Negative mg/dL / Nitrite: Negative   Leuk Esterase: Negative / RBC: x / WBC x   Sq Epi: x / Non Sq Epi: x / Bacteria: x            RADIOLOGY & ADDITIONAL TESTS:    Care Discussed with Consultants/Other Providers:

## 2024-05-05 NOTE — PROGRESS NOTE ADULT - SUBJECTIVE AND OBJECTIVE BOX
Patient is a 53y old  Male who presents with a chief complaint of Hematoma s/p evacuation c/b skin infection (05 May 2024 09:48)    ---------------------------------------------------------------------  SUBJECTIVE:  Seen and evaluated at bedside this AM. No acute issues overnight. Pain well controlled.     Other ROS:  Denies: headache, CP, SOB, pain, bowel or bladder issues  ----------------------------------------------------------------------  PHYSICAL EXAM:    Vital Signs Last 24 Hrs  T(C): 36.4 (05 May 2024 08:55), Max: 36.8 (04 May 2024 21:45)  T(F): 97.6 (05 May 2024 08:55), Max: 98.3 (04 May 2024 21:45)  HR: 73 (05 May 2024 08:55) (64 - 73)  BP: 97/60 (05 May 2024 08:55) (97/60 - 118/70)  BP(mean): --  RR: 16 (05 May 2024 08:55) (16 - 18)  SpO2: 95% (05 May 2024 08:55) (95% - 97%)    Parameters below as of 05 May 2024 08:55  Patient On (Oxygen Delivery Method): room air      Daily     Daily       General - NAD, alert, follows commands  Heart- pulse regular  Lungs- breathing comfortably without respiratory distress  Abd- no visible abdominal distension   Ext- No calf pain, extremities well perfused, +LLE wound vac in place  Neuro- Exam unchanged; moves all extremities against gravity  ----------------------------------------------------------------------  RECENT LABS:                CAPILLARY BLOOD GLUCOSE        ----------------------------------------------------------------------  RECENT IMAGING:    ***  ----------------------------------------------------------------------  MEDICATIONS:  MEDICATIONS  (STANDING):  acetaminophen     Tablet .. 975 milliGRAM(s) Oral every 6 hours  aspirin  chewable 81 milliGRAM(s) Oral daily  atorvastatin 20 milliGRAM(s) Oral at bedtime  enoxaparin Injectable 40 milliGRAM(s) SubCutaneous every 24 hours  gabapentin 300 milliGRAM(s) Oral three times a day  lacosamide 150 milliGRAM(s) Oral two times a day  lidocaine   4% Patch 1 Patch Transdermal <User Schedule>  melatonin 3 milliGRAM(s) Oral at bedtime  multivitamin 1 Tablet(s) Oral daily  polyethylene glycol 3350 17 Gram(s) Oral daily  senna 2 Tablet(s) Oral at bedtime  tamsulosin 0.4 milliGRAM(s) Oral at bedtime    MEDICATIONS  (PRN):  bisacodyl 5 milliGRAM(s) Oral every 12 hours PRN Constipation  meclizine 12.5 milliGRAM(s) Oral three times a day PRN Dizziness  oxyCODONE    IR 10 milliGRAM(s) Oral every 6 hours PRN Severe Pain (7 - 10)  oxyCODONE    IR 5 milliGRAM(s) Oral every 6 hours PRN Moderate Pain (4 - 6)  traMADol 50 milliGRAM(s) Oral every 4 hours PRN Mild Pain (1 - 3)    ----------------------------------------------------------------------

## 2024-05-06 LAB
ALBUMIN SERPL ELPH-MCNC: 2.9 G/DL — LOW (ref 3.3–5)
ALP SERPL-CCNC: 61 U/L — SIGNIFICANT CHANGE UP (ref 40–120)
ALT FLD-CCNC: 23 U/L — SIGNIFICANT CHANGE UP (ref 10–45)
ANION GAP SERPL CALC-SCNC: 10 MMOL/L — SIGNIFICANT CHANGE UP (ref 5–17)
AST SERPL-CCNC: 19 U/L — SIGNIFICANT CHANGE UP (ref 10–40)
BASOPHILS # BLD AUTO: 0.08 K/UL — SIGNIFICANT CHANGE UP (ref 0–0.2)
BASOPHILS NFR BLD AUTO: 1.6 % — SIGNIFICANT CHANGE UP (ref 0–2)
BILIRUB SERPL-MCNC: 0.2 MG/DL — SIGNIFICANT CHANGE UP (ref 0.2–1.2)
BUN SERPL-MCNC: 24 MG/DL — HIGH (ref 7–23)
CALCIUM SERPL-MCNC: 8.7 MG/DL — SIGNIFICANT CHANGE UP (ref 8.4–10.5)
CHLORIDE SERPL-SCNC: 110 MMOL/L — HIGH (ref 96–108)
CO2 SERPL-SCNC: 24 MMOL/L — SIGNIFICANT CHANGE UP (ref 22–31)
CREAT SERPL-MCNC: 0.84 MG/DL — SIGNIFICANT CHANGE UP (ref 0.5–1.3)
EGFR: 104 ML/MIN/1.73M2 — SIGNIFICANT CHANGE UP
EOSINOPHIL # BLD AUTO: 0.42 K/UL — SIGNIFICANT CHANGE UP (ref 0–0.5)
EOSINOPHIL NFR BLD AUTO: 8.4 % — HIGH (ref 0–6)
GLUCOSE SERPL-MCNC: 90 MG/DL — SIGNIFICANT CHANGE UP (ref 70–99)
HCT VFR BLD CALC: 35.8 % — LOW (ref 39–50)
HGB BLD-MCNC: 11.6 G/DL — LOW (ref 13–17)
IMM GRANULOCYTES NFR BLD AUTO: 0.4 % — SIGNIFICANT CHANGE UP (ref 0–0.9)
LYMPHOCYTES # BLD AUTO: 1.48 K/UL — SIGNIFICANT CHANGE UP (ref 1–3.3)
LYMPHOCYTES # BLD AUTO: 29.6 % — SIGNIFICANT CHANGE UP (ref 13–44)
MCHC RBC-ENTMCNC: 28 PG — SIGNIFICANT CHANGE UP (ref 27–34)
MCHC RBC-ENTMCNC: 32.4 GM/DL — SIGNIFICANT CHANGE UP (ref 32–36)
MCV RBC AUTO: 86.5 FL — SIGNIFICANT CHANGE UP (ref 80–100)
MONOCYTES # BLD AUTO: 0.52 K/UL — SIGNIFICANT CHANGE UP (ref 0–0.9)
MONOCYTES NFR BLD AUTO: 10.4 % — SIGNIFICANT CHANGE UP (ref 2–14)
NEUTROPHILS # BLD AUTO: 2.48 K/UL — SIGNIFICANT CHANGE UP (ref 1.8–7.4)
NEUTROPHILS NFR BLD AUTO: 49.6 % — SIGNIFICANT CHANGE UP (ref 43–77)
NRBC # BLD: 0 /100 WBCS — SIGNIFICANT CHANGE UP (ref 0–0)
PLATELET # BLD AUTO: 143 K/UL — LOW (ref 150–400)
POTASSIUM SERPL-MCNC: 4.3 MMOL/L — SIGNIFICANT CHANGE UP (ref 3.5–5.3)
POTASSIUM SERPL-SCNC: 4.3 MMOL/L — SIGNIFICANT CHANGE UP (ref 3.5–5.3)
PROT SERPL-MCNC: 6.7 G/DL — SIGNIFICANT CHANGE UP (ref 6–8.3)
RBC # BLD: 4.14 M/UL — LOW (ref 4.2–5.8)
RBC # FLD: 14.8 % — HIGH (ref 10.3–14.5)
SODIUM SERPL-SCNC: 144 MMOL/L — SIGNIFICANT CHANGE UP (ref 135–145)
WBC # BLD: 5 K/UL — SIGNIFICANT CHANGE UP (ref 3.8–10.5)
WBC # FLD AUTO: 5 K/UL — SIGNIFICANT CHANGE UP (ref 3.8–10.5)

## 2024-05-06 PROCEDURE — 99232 SBSQ HOSP IP/OBS MODERATE 35: CPT

## 2024-05-06 PROCEDURE — 99232 SBSQ HOSP IP/OBS MODERATE 35: CPT | Mod: GC

## 2024-05-06 RX ORDER — MECLIZINE HCL 12.5 MG
25 TABLET ORAL ONCE
Refills: 0 | Status: COMPLETED | OUTPATIENT
Start: 2024-05-06 | End: 2024-05-06

## 2024-05-06 RX ORDER — ONDANSETRON 8 MG/1
4 TABLET, FILM COATED ORAL ONCE
Refills: 0 | Status: COMPLETED | OUTPATIENT
Start: 2024-05-06 | End: 2024-05-06

## 2024-05-06 RX ADMIN — GABAPENTIN 300 MILLIGRAM(S): 400 CAPSULE ORAL at 05:48

## 2024-05-06 RX ADMIN — OXYCODONE HYDROCHLORIDE 5 MILLIGRAM(S): 5 TABLET ORAL at 15:09

## 2024-05-06 RX ADMIN — ATORVASTATIN CALCIUM 20 MILLIGRAM(S): 80 TABLET, FILM COATED ORAL at 21:13

## 2024-05-06 RX ADMIN — SENNA PLUS 2 TABLET(S): 8.6 TABLET ORAL at 21:13

## 2024-05-06 RX ADMIN — Medication 81 MILLIGRAM(S): at 12:33

## 2024-05-06 RX ADMIN — LIDOCAINE 1 PATCH: 4 CREAM TOPICAL at 19:55

## 2024-05-06 RX ADMIN — Medication 975 MILLIGRAM(S): at 18:42

## 2024-05-06 RX ADMIN — ONDANSETRON 4 MILLIGRAM(S): 8 TABLET, FILM COATED ORAL at 09:30

## 2024-05-06 RX ADMIN — Medication 975 MILLIGRAM(S): at 05:48

## 2024-05-06 RX ADMIN — LACOSAMIDE 150 MILLIGRAM(S): 50 TABLET ORAL at 05:49

## 2024-05-06 RX ADMIN — Medication 975 MILLIGRAM(S): at 23:39

## 2024-05-06 RX ADMIN — Medication 975 MILLIGRAM(S): at 17:50

## 2024-05-06 RX ADMIN — LIDOCAINE 1 PATCH: 4 CREAM TOPICAL at 19:54

## 2024-05-06 RX ADMIN — Medication 975 MILLIGRAM(S): at 12:32

## 2024-05-06 RX ADMIN — TAMSULOSIN HYDROCHLORIDE 0.4 MILLIGRAM(S): 0.4 CAPSULE ORAL at 21:13

## 2024-05-06 RX ADMIN — OXYCODONE HYDROCHLORIDE 5 MILLIGRAM(S): 5 TABLET ORAL at 16:05

## 2024-05-06 RX ADMIN — Medication 25 MILLIGRAM(S): at 09:31

## 2024-05-06 RX ADMIN — Medication 975 MILLIGRAM(S): at 13:30

## 2024-05-06 RX ADMIN — Medication 1 TABLET(S): at 12:32

## 2024-05-06 RX ADMIN — LACOSAMIDE 150 MILLIGRAM(S): 50 TABLET ORAL at 17:50

## 2024-05-06 RX ADMIN — LIDOCAINE 1 PATCH: 4 CREAM TOPICAL at 08:41

## 2024-05-06 RX ADMIN — Medication 3 MILLIGRAM(S): at 21:13

## 2024-05-06 RX ADMIN — GABAPENTIN 300 MILLIGRAM(S): 400 CAPSULE ORAL at 13:53

## 2024-05-06 RX ADMIN — ENOXAPARIN SODIUM 40 MILLIGRAM(S): 100 INJECTION SUBCUTANEOUS at 21:12

## 2024-05-06 RX ADMIN — GABAPENTIN 300 MILLIGRAM(S): 400 CAPSULE ORAL at 21:12

## 2024-05-06 NOTE — PROGRESS NOTE ADULT - SUBJECTIVE AND OBJECTIVE BOX
Patient is a 53y old  Male who presents with a chief complaint of Hematoma s/p evacuation c/b skin infection (06 May 2024 10:44)      Subjective and overnight events:  Patient seen and examined at bedside. reports dizziness (pinning sensation a/w nausea) when pt tried to get up this morning. no fever, chills, headache, sob, cp, abd pain.    ALLERGIES:  No Known Allergies    MEDICATIONS  (STANDING):  acetaminophen     Tablet .. 975 milliGRAM(s) Oral every 6 hours  aspirin  chewable 81 milliGRAM(s) Oral daily  atorvastatin 20 milliGRAM(s) Oral at bedtime  enoxaparin Injectable 40 milliGRAM(s) SubCutaneous every 24 hours  gabapentin 300 milliGRAM(s) Oral three times a day  lacosamide 150 milliGRAM(s) Oral two times a day  lidocaine   4% Patch 1 Patch Transdermal <User Schedule>  melatonin 3 milliGRAM(s) Oral at bedtime  multivitamin 1 Tablet(s) Oral daily  polyethylene glycol 3350 17 Gram(s) Oral daily  senna 2 Tablet(s) Oral at bedtime  tamsulosin 0.4 milliGRAM(s) Oral at bedtime    MEDICATIONS  (PRN):  bisacodyl 5 milliGRAM(s) Oral every 12 hours PRN Constipation  meclizine 12.5 milliGRAM(s) Oral three times a day PRN Dizziness  oxyCODONE    IR 10 milliGRAM(s) Oral every 6 hours PRN Severe Pain (7 - 10)  oxyCODONE    IR 5 milliGRAM(s) Oral every 6 hours PRN Moderate Pain (4 - 6)  traMADol 50 milliGRAM(s) Oral every 4 hours PRN Mild Pain (1 - 3)    Vital Signs Last 24 Hrs  T(F): 97.3 (06 May 2024 08:36), Max: 97.8 (05 May 2024 21:16)  HR: 77 (06 May 2024 08:36) (56 - 77)  BP: 111/73 (06 May 2024 08:36) (108/69 - 117/69)  RR: 16 (06 May 2024 08:36) (16 - 16)  SpO2: 95% (06 May 2024 08:36) (95% - 99%)  I&O's Summary    PHYSICAL EXAM:  General: NAD, Awake alert. mild aphasia  ENT: MMM  Neck: Supple, No JVD  Lungs: Clear to auscultation bilaterally  Cardio: RRR, S1/S2, No murmurs  Abdomen: Soft, Nontender, Nondistended; Bowel sounds present  Extremities: No calf tenderness, No pitting edema    LABS:                        11.6   5.00  )-----------( 143      ( 06 May 2024 05:40 )             35.8     05-06    144  |  110  |  24  ----------------------------<  90  4.3   |  24  |  0.84    Ca    8.7      06 May 2024 05:40    TPro  6.7  /  Alb  2.9  /  TBili  0.2  /  DBili  x   /  AST  19  /  ALT  23  /  AlkPhos  61  05-06                Urinalysis Basic - ( 06 May 2024 05:40 )    Color: x / Appearance: x / SG: x / pH: x  Gluc: 90 mg/dL / Ketone: x  / Bili: x / Urobili: x   Blood: x / Protein: x / Nitrite: x   Leuk Esterase: x / RBC: x / WBC x   Sq Epi: x / Non Sq Epi: x / Bacteria: x            RADIOLOGY & ADDITIONAL TESTS:    Care Discussed with Consultants/Other Providers:

## 2024-05-06 NOTE — PROGRESS NOTE ADULT - ASSESSMENT
# Altered mental status, likely sec to metabolic encephalopathy due to opiods, now resolved. No evidence of acute infection, imaging neg for acute issues, lab work reviewed.   abg reviewed, no evidence of hypercapnia or hypoxemia.  - monitor for excessive sedation on oxycodone. taper off as possible.    #L thigh hematoma s/p evacuation  #weakness, debility  #skin infection  - s/p hematoma evacuation w/ vascular on 4/5 c/b multiorganism infection s/p wash out on 4/19  - s/p zosyn completed on 4/25 per ID recommendation  - cont wound vac  - comprehensive rehab for weakness, walks with walker  - pain management    # Vertigo  - pt reports vertigo episode this morning  - continue with meclizine PRN. may need to increase dose to meclizine 25mg     # CVA  - continue aspirin 81mg, statin    # ADHD  - home med: Vyvanse 50mg daily    # Seizure disorder  - Vimpat 150mg BID    # HLD  - Lipitor 20mg    # DVT ppx  - SCDs, no pharm ppx at this time      Case discussed with physiatry team.

## 2024-05-06 NOTE — PROGRESS NOTE ADULT - ASSESSMENT
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  Patient now admitted for a multidisciplinary rehab program. 04-27    Hematoma s/p evacuation  Comprehensive Multidisciplinary Rehab Program:  - comprehensive rehab program of PT/OT/SLP - 3 hours a day, 5 days a week.   - s/p hematoma evacuation w/ vascular on 4/5  - plastics consulted for wound eval and recommended outpatient appt  - IR consulted but no intervention recommended  - Vascular w/ washout on 4/19 for skin infection  -wound vac placed on 4/26- wound care F/U   -aspirin 81mg  -cleared for DVT ppx w/Lovenox-only.   -Resume AC for CVA ppx per Vascular plan     Dizziness:  - Meclizine one dose (05/06) with good response   - Zofran   - Monitor     Worsening Apraxia/AMS  - CTH, no acute findings (5/2)  - urinalysis negative  - likely associated with pain medications     Skin Infection  -surgical swab w/ multi organism infection  -s/p zosyn, completed abx on 4/25    Seizure disorder  -Vimpat 150mg BID    ADHD  -Vyvanse 50mg daily     Vertigo  -Meclizine prn     HLD  - Lipitor 20mg    Pain  - Neurontin- Titrate up for left foot pain  - Lidocaine patch to foot  - Tylenol PRN and oxycodone prn  - Avoid sedating medications that may interfere with cognitive recovery    GI / Bowel  - Senna qHS  - Miralax Daily    Skin / Pressure injury  - Skin assessment on admission performed: No pressure injury  - Monitor Incisions:  wound vac and incision on the left thigh  - Turn q2 hours in bed while awake, air mattress  - nursing to monitor skin qShift  - soft heel protectors  - skin barrier cream PRN    DVT prophylaxis:   - Lovenox  - Last Doppler on 4/6 -> negative for B/L LE DVTs      Outpatient Follow-up:  UROLOGY [address] [place] [address]  Scheduled Appointment: 05/13/2024    [facility]  UROLOGY [address] [place] [address]  Scheduled Appointment: 05/13/2024    Rodolfo Johnson  Internal Medicine  [address] [address]  Phone: [phone]  Fax: [phone]  Follow Up Time:     Mariah Christie  Vascular Surgery  [address] [facility], [address]  [place] [address]  Phone: [phone]  Fax: [phone]  Follow Up Time:

## 2024-05-06 NOTE — PROGRESS NOTE ADULT - SUBJECTIVE AND OBJECTIVE BOX
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 CTA demonstrated a large hematoma in the left thigh w/ a small hematoma of the RLE. Patient s/p hematoma evacuation by vascular on 4/5. plastic surgery consulted and recommending outpatient follow up. IR consulted but no drainage offered. Patient s/p OR w/ vascular on 4/19 for washout after found to have multiorganism skin infection.  Patient had wound vac placed on 4/26 by wound team. Patient completed IV zosyn on 4/25 for skin infection. Patient seen by rehab team and recommended for acute inpatient rehabilitation. Patient discharged to [place] on 4/27 after medically stabilized and optimized.     Allergies  No Known Allergies    Subjective:  Patient was seen and examined at bedside.  No overnight events    Sleeping well, this am complained of dizziness and vomiting x 1, evaluated by the hospitalist   Pain is well controlled with current meds   LBM (05/05), voiding without issues   Participating and tolerating daily therapy, motivated to get better     ROS:  Denies CP, palpitation, cough, SOB, fever, chills, headache, dizziness, abdominal pain, dysuria, (+) joint pain      MEDICATIONS  (STANDING):  acetaminophen     Tablet .. 975 milliGRAM(s) Oral every 6 hours  aspirin  chewable 81 milliGRAM(s) Oral daily  atorvastatin 20 milliGRAM(s) Oral at bedtime  enoxaparin Injectable 40 milliGRAM(s) SubCutaneous every 24 hours  gabapentin 300 milliGRAM(s) Oral three times a day  lacosamide 150 milliGRAM(s) Oral two times a day  lidocaine   4% Patch 1 Patch Transdermal <User Schedule>  melatonin 3 milliGRAM(s) Oral at bedtime  multivitamin 1 Tablet(s) Oral daily  polyethylene glycol 3350 17 Gram(s) Oral daily  senna 2 Tablet(s) Oral at bedtime  tamsulosin 0.4 milliGRAM(s) Oral at bedtime    MEDICATIONS  (PRN):  bisacodyl 5 milliGRAM(s) Oral every 12 hours PRN Constipation  meclizine 12.5 milliGRAM(s) Oral three times a day PRN Dizziness  oxyCODONE    IR 10 milliGRAM(s) Oral every 6 hours PRN Severe Pain (7 - 10)  oxyCODONE    IR 5 milliGRAM(s) Oral every 6 hours PRN Moderate Pain (4 - 6)  traMADol 50 milliGRAM(s) Oral every 4 hours PRN Mild Pain (1 - 3)    Recent Labs:                         11.6   5.00  )-----------( 143      ( 06 May 2024 05:40 )             35.8     05-06    144  |  110<H>  |  24<H>  ----------------------------<  90  4.3   |  24  |  0.84    Ca    8.7      06 May 2024 05:40    TPro  6.7  /  Alb  2.9<L>  /  TBili  0.2  /  DBili  x   /  AST  19  /  ALT  23  /  AlkPhos  61  05-06    LIVER FUNCTIONS - ( 06 May 2024 05:40 )  Alb: 2.9 g/dL / Pro: 6.7 g/dL / ALK PHOS: 61 U/L / ALT: 23 U/L / AST: 19 U/L / GGT: x             Physical Exam:   Vital Signs Last 24 Hrs  T(C): 36.3 (06 May 2024 08:36), Max: 36.6 (05 May 2024 21:16)  T(F): 97.3 (06 May 2024 08:36), Max: 97.8 (05 May 2024 21:16)  HR: 77 (06 May 2024 08:36) (56 - 77)  BP: 111/73 (06 May 2024 08:36) (108/69 - 117/69)  RR: 16 (06 May 2024 08:36) (16 - 16)  SpO2: 95% (06 May 2024 08:36) (95% - 99%)  Parameters below as of 06 May 2024 08:36  Patient On (Oxygen Delivery Method): room air      Gen - NAD, Comfortable  HEENT: NCAT, EOMI, PERRLA   Neck - Supple, No limited ROM  Pulm - CTAB,  No crackles  Cardiovascular - RRR  Abdomen - Soft, NT, ND, +BS  Extremities - No C/C/E, No calf tenderness, Left foot DF is limited   Neuro-     Cognitive - AAOx3, Anomia and wards finding difficulty is improving, (+) cognitive deficits with memory      Motor -                     LEFT    UE - ShAB 5/5, EF 5/5, EE 5/5, WE 5/5,  5/5                    RIGHT UE - ShAB 5/5, EF 5/5, EE 5/5, WE 5/5,  5/5                    LEFT    LE - HF 4/5, KE 4/5, DF 3/5, PF 4/5                    RIGHT LE - HF 4/5, KE 4/5, DF 4/5, PF 4/5        Sensory - (+) numbness on the LLE from ankle joint and all over the foot   Psychiatric - Mood stable, Affect WNL  Skin:  Wound Measures 10.5cm x4.0cm x1.4cm L thigh, old fasciotomy site present with wound Vac , Stage 1 nonblanchable pressure redness on the right ankle      Continue comprehensive acute rehab program consisting of 3hrs/day of OT/PT and SLP.

## 2024-05-07 PROCEDURE — 99232 SBSQ HOSP IP/OBS MODERATE 35: CPT

## 2024-05-07 PROCEDURE — 99233 SBSQ HOSP IP/OBS HIGH 50: CPT | Mod: GC

## 2024-05-07 RX ADMIN — Medication 975 MILLIGRAM(S): at 05:24

## 2024-05-07 RX ADMIN — GABAPENTIN 300 MILLIGRAM(S): 400 CAPSULE ORAL at 13:08

## 2024-05-07 RX ADMIN — Medication 3 MILLIGRAM(S): at 21:08

## 2024-05-07 RX ADMIN — Medication 81 MILLIGRAM(S): at 13:07

## 2024-05-07 RX ADMIN — GABAPENTIN 300 MILLIGRAM(S): 400 CAPSULE ORAL at 05:25

## 2024-05-07 RX ADMIN — SENNA PLUS 2 TABLET(S): 8.6 TABLET ORAL at 21:08

## 2024-05-07 RX ADMIN — ATORVASTATIN CALCIUM 20 MILLIGRAM(S): 80 TABLET, FILM COATED ORAL at 21:08

## 2024-05-07 RX ADMIN — LACOSAMIDE 150 MILLIGRAM(S): 50 TABLET ORAL at 05:25

## 2024-05-07 RX ADMIN — Medication 1 TABLET(S): at 13:08

## 2024-05-07 RX ADMIN — TAMSULOSIN HYDROCHLORIDE 0.4 MILLIGRAM(S): 0.4 CAPSULE ORAL at 21:08

## 2024-05-07 RX ADMIN — LIDOCAINE 1 PATCH: 4 CREAM TOPICAL at 07:18

## 2024-05-07 RX ADMIN — GABAPENTIN 300 MILLIGRAM(S): 400 CAPSULE ORAL at 21:08

## 2024-05-07 RX ADMIN — LIDOCAINE 1 PATCH: 4 CREAM TOPICAL at 05:26

## 2024-05-07 RX ADMIN — Medication 975 MILLIGRAM(S): at 06:07

## 2024-05-07 RX ADMIN — LACOSAMIDE 150 MILLIGRAM(S): 50 TABLET ORAL at 18:20

## 2024-05-07 RX ADMIN — Medication 975 MILLIGRAM(S): at 13:08

## 2024-05-07 RX ADMIN — Medication 975 MILLIGRAM(S): at 18:20

## 2024-05-07 RX ADMIN — LIDOCAINE 1 PATCH: 4 CREAM TOPICAL at 17:26

## 2024-05-07 RX ADMIN — Medication 975 MILLIGRAM(S): at 14:00

## 2024-05-07 RX ADMIN — Medication 975 MILLIGRAM(S): at 00:34

## 2024-05-07 RX ADMIN — ENOXAPARIN SODIUM 40 MILLIGRAM(S): 100 INJECTION SUBCUTANEOUS at 21:07

## 2024-05-07 RX ADMIN — Medication 975 MILLIGRAM(S): at 19:20

## 2024-05-07 NOTE — PROGRESS NOTE ADULT - ASSESSMENT
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  Patient now admitted for a multidisciplinary rehab program.       #Altered mental status, likely sec to metabolic encephalopathy due to opioids now resolved.   - No evidence of acute infection  - Mental status back to baseline  - monitor for excessive sedation on oxycodone. taper off as possible.    #L thigh hematoma s/p evacuation  #weakness, debility  #skin infection  - s/p hematoma evacuation w/ vascular on 4/5 c/b multiorganism infection s/p wash out on 4/19  - s/p zosyn completed on 4/25 per ID recommendation  - cont wound vac  - comprehensive rehab for weakness, walks with walker  - pain management    # Vertigo  - one episode of recurrent vertigo 5/6, now resolved   - continue with meclizine PRN.     # CVA  - continue aspirin 81mg, statin    # ADHD  - home med: Vyvanse 50mg daily    # Seizure disorder  - Vimpat 150mg BID    # HLD  - Lipitor 20mg    # DVT ppx  - SCDs, no pharm ppx at this time      Case discussed with physiatry team.

## 2024-05-07 NOTE — PROGRESS NOTE ADULT - ASSESSMENT
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  Patient now admitted for a multidisciplinary rehab program. 04-27    Hematoma s/p evacuation  Comprehensive Multidisciplinary Rehab Program:  - comprehensive rehab program of PT/OT/SLP - 3 hours a day, 5 days a week.   - s/p hematoma evacuation w/ vascular on 4/5  - plastics consulted for wound eval and recommended outpatient appt  - IR consulted but no intervention recommended  - Vascular w/ washout on 4/19 for skin infection  -wound vac placed on 4/26- wound care F/U   -aspirin 81mg  -cleared for DVT ppx w/Lovenox-only.   -Resume AC for CVA ppx per Vascular plan     Dizziness:  - Meclizine one dose (05/06) with good response   - Zofran   - Monitor     Worsening Apraxia/AMS  - CTH, no acute findings (5/2)  - urinalysis negative  - likely associated with pain medications     Skin Infection  -surgical swab w/ multi organism infection  -s/p zosyn, completed abx on 4/25    Seizure disorder  -Vimpat 150mg BID    ADHD  -Vyvanse 50mg daily     Vertigo  -Meclizine prn     HLD  - Lipitor 20mg    Pain  - Neurontin- Titrate up for left foot pain  - Lidocaine patch to foot  - Tylenol PRN and oxycodone prn  - Avoid sedating medications that may interfere with cognitive recovery    GI / Bowel  - Senna qHS  - Miralax Daily    Skin / Pressure injury  - Skin assessment on admission performed: No pressure injury  - Monitor Incisions:  wound vac and incision on the left thigh  - Turn q2 hours in bed while awake, air mattress  - nursing to monitor skin qShift  - soft heel protectors  - skin barrier cream PRN    DVT prophylaxis:   - Lovenox  - Last Doppler on 4/6 -> negative for B/L LE DVTs      Outpatient Follow-up:  UROLOGY [address] [place] [address]  Scheduled Appointment: 05/13/2024    [facility]  UROLOGY [address] [place] [address]  Scheduled Appointment: 05/13/2024    Rodolfo Johnson  Internal Medicine  [address] [address]  Phone: [phone]  Fax: [phone]  Follow Up Time:     Mariah Christie  Vascular Surgery  [address] [facility], [address]  [place] [address]  Phone: [phone]  Fax: [phone]  Follow Up Time:     Assessment/Plan:  TAMI DUNHAM is a 53y with hx of CVA w/ residual expressive aphasia, 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  Patient now admitted for a multidisciplinary rehab program. 04-27    Hematoma s/p evacuation  Comprehensive Multidisciplinary Rehab Program:  - comprehensive rehab program of PT/OT/SLP - 3 hours a day, 5 days a week.   - s/p hematoma evacuation w/ vascular on 4/5  - plastics consulted for wound eval and recommended outpatient appt  - IR consulted but no intervention recommended  - Vascular w/ washout on 4/19 for skin infection  -wound vac placed on 4/26, change x 3/ week- wound care F/U   -aspirin 81mg  -cleared for DVT ppx w/Lovenox-only.   -Resume AC for CVA ppx per Vascular plan     Dizziness/ better  - Meclizine one dose (05/06) with good response   - Zofran   - Monitor     Worsening Apraxia/AMS  - CTH, no acute findings (5/2)  - urinalysis negative  - likely associated with pain medications     Skin Infection  -surgical swab w/ multi organism infection  -s/p zosyn, completed abx on 4/25    Seizure disorder  -Vimpat 150mg BID    ADHD  -Vyvanse 50mg daily     Vertigo  -Meclizine prn     HLD  - Lipitor 20mg    Pain  - Neurontin- Titrate up for left foot pain  - Lidocaine patch to foot  - Tylenol PRN and oxycodone prn  - Avoid sedating medications that may interfere with cognitive recovery    GI / Bowel  - Senna qHS  - Miralax Daily    Skin / Pressure injury  - Skin assessment on admission performed: No pressure injury  - Monitor Incisions:  wound vac and incision on the left thigh  - Turn q2 hours in bed while awake, air mattress  - nursing to monitor skin qShift  - soft heel protectors  - skin barrier cream PRN    DVT prophylaxis:   - Lovenox  - Last Doppler on 4/6 -> negative for B/L LE DVTs      Outpatient Follow-up:  UROLOGY [address] [facility]  Scheduled Appointment: 05/13/2024    [facility]  UROLOGY [address] [place] [address]  Scheduled Appointment: 05/13/2024    Rodolfo Johnson  Internal Medicine  [address] [address]  Phone: [phone]  Fax: [phone]  Follow Up Time:     Mariah Christie  Vascular Surgery  [address] [facility], [address]  [place] [address]  Phone: [phone]  Fax: [phone]  Follow Up Time:

## 2024-05-07 NOTE — PROGRESS NOTE ADULT - SUBJECTIVE AND OBJECTIVE BOX
Patient is a 53y old  Male who presents with a chief complaint of Hematoma s/p evacuation c/b skin infection (07 May 2024 08:50)      Subjective and overnight events:  Patient seen and examined at bedside. pt reports feeling well today. no more dizziness or nausea. no fever, chills, sob, cp, abd pain.     ALLERGIES:  No Known Allergies    MEDICATIONS  (STANDING):  acetaminophen     Tablet .. 975 milliGRAM(s) Oral every 6 hours  aspirin  chewable 81 milliGRAM(s) Oral daily  atorvastatin 20 milliGRAM(s) Oral at bedtime  enoxaparin Injectable 40 milliGRAM(s) SubCutaneous every 24 hours  gabapentin 300 milliGRAM(s) Oral three times a day  lacosamide 150 milliGRAM(s) Oral two times a day  lidocaine   4% Patch 1 Patch Transdermal <User Schedule>  melatonin 3 milliGRAM(s) Oral at bedtime  multivitamin 1 Tablet(s) Oral daily  polyethylene glycol 3350 17 Gram(s) Oral daily  senna 2 Tablet(s) Oral at bedtime  tamsulosin 0.4 milliGRAM(s) Oral at bedtime    MEDICATIONS  (PRN):  bisacodyl 5 milliGRAM(s) Oral every 12 hours PRN Constipation  meclizine 12.5 milliGRAM(s) Oral three times a day PRN Dizziness  oxyCODONE    IR 10 milliGRAM(s) Oral every 6 hours PRN Severe Pain (7 - 10)  oxyCODONE    IR 5 milliGRAM(s) Oral every 6 hours PRN Moderate Pain (4 - 6)  traMADol 50 milliGRAM(s) Oral every 4 hours PRN Mild Pain (1 - 3)    Vital Signs Last 24 Hrs  T(F): 97.5 (07 May 2024 07:52), Max: 98.1 (06 May 2024 19:57)  HR: 80 (07 May 2024 07:52) (68 - 80)  BP: 118/83 (07 May 2024 07:52) (103/68 - 118/83)  RR: 16 (07 May 2024 07:52) (16 - 16)  SpO2: 100% (07 May 2024 07:52) (98% - 100%)  I&O's Summary    PHYSICAL EXAM:  General: NAD, A/O x 3  ENT: MMM  Neck: Supple, No JVD  Lungs: Clear to auscultation bilaterally  Cardio: RRR, S1/S2, No murmurs  Abdomen: Soft, Nontender, Nondistended; Bowel sounds present  Extremities: No calf tenderness, No pitting edema    LABS:                        11.6   5.00  )-----------( 143      ( 06 May 2024 05:40 )             35.8     05-06    144  |  110  |  24  ----------------------------<  90  4.3   |  24  |  0.84    Ca    8.7      06 May 2024 05:40    TPro  6.7  /  Alb  2.9  /  TBili  0.2  /  DBili  x   /  AST  19  /  ALT  23  /  AlkPhos  61  05-06                                Urinalysis Basic - ( 06 May 2024 05:40 )    Color: x / Appearance: x / SG: x / pH: x  Gluc: 90 mg/dL / Ketone: x  / Bili: x / Urobili: x   Blood: x / Protein: x / Nitrite: x   Leuk Esterase: x / RBC: x / WBC x   Sq Epi: x / Non Sq Epi: x / Bacteria: x            RADIOLOGY & ADDITIONAL TESTS:    Care Discussed with Consultants/Other Providers:

## 2024-05-07 NOTE — PROGRESS NOTE ADULT - SUBJECTIVE AND OBJECTIVE BOX
HPI:  Patient is a 54 yo M with hx of CVA with residual expressive aphasia, bilateral DVTs on chronic Lovenox SQ (150Qd), compartment syndrome status post left fasciotomy (L below the knee), vertigo, seizure disorder who presented to [facility] on 4/4/24 for atraumatic left thigh swelling. CTA demonstrated a large hematoma in the left thigh w/ a small hematoma of the RLE. Patient s/p hematoma evacuation by vascular on 4/5. plastic surgery consulted and recommending outpatient follow up. IR consulted but no drainage offered. Patient s/p OR w/ vascular on 4/19 for washout after found to have multiorganism skin infection.  Patient had wound vac placed on 4/26 by wound team. Patient completed IV zosyn on 4/25 for skin infection. Patient seen by rehab team and recommended for acute inpatient rehabilitation. Patient discharged to [facility] on 4/27 after medically stabilized and optimized.     Allergies  No Known Allergies    Subjective:  Patient was seen and examined at bedside.  No overnight events    Sleeping well, this am complained of dizziness and vomiting x 1, evaluated by the hospitalist   Pain is well controlled with current meds   LBM (05/05), voiding without issues   Participating and tolerating daily therapy, motivated to get better     ROS:  Denies CP, palpitation, cough, SOB, fever, chills, headache, dizziness, abdominal pain, dysuria, (+) joint pain      MEDICATIONS  (STANDING):  acetaminophen     Tablet .. 975 milliGRAM(s) Oral every 6 hours  aspirin  chewable 81 milliGRAM(s) Oral daily  atorvastatin 20 milliGRAM(s) Oral at bedtime  enoxaparin Injectable 40 milliGRAM(s) SubCutaneous every 24 hours  gabapentin 300 milliGRAM(s) Oral three times a day  lacosamide 150 milliGRAM(s) Oral two times a day  lidocaine   4% Patch 1 Patch Transdermal <User Schedule>  melatonin 3 milliGRAM(s) Oral at bedtime  multivitamin 1 Tablet(s) Oral daily  polyethylene glycol 3350 17 Gram(s) Oral daily  senna 2 Tablet(s) Oral at bedtime  tamsulosin 0.4 milliGRAM(s) Oral at bedtime    MEDICATIONS  (PRN):  bisacodyl 5 milliGRAM(s) Oral every 12 hours PRN Constipation  meclizine 12.5 milliGRAM(s) Oral three times a day PRN Dizziness  oxyCODONE    IR 10 milliGRAM(s) Oral every 6 hours PRN Severe Pain (7 - 10)  oxyCODONE    IR 5 milliGRAM(s) Oral every 6 hours PRN Moderate Pain (4 - 6)  traMADol 50 milliGRAM(s) Oral every 4 hours PRN Mild Pain (1 - 3)    Recent Labs:                         11.6   5.00  )-----------( 143      ( 06 May 2024 05:40 )             35.8     05-06    144  |  110<H>  |  24<H>  ----------------------------<  90  4.3   |  24  |  0.84    Ca    8.7      06 May 2024 05:40    TPro  6.7  /  Alb  2.9<L>  /  TBili  0.2  /  DBili  x   /  AST  19  /  ALT  23  /  AlkPhos  61  05-06    LIVER FUNCTIONS - ( 06 May 2024 05:40 )  Alb: 2.9 g/dL / Pro: 6.7 g/dL / ALK PHOS: 61 U/L / ALT: 23 U/L / AST: 19 U/L / GGT: x             Physical Exam:   Vital Signs Last 24 Hrs  T(C): 36.3 (06 May 2024 08:36), Max: 36.6 (05 May 2024 21:16)  T(F): 97.3 (06 May 2024 08:36), Max: 97.8 (05 May 2024 21:16)  HR: 77 (06 May 2024 08:36) (56 - 77)  BP: 111/73 (06 May 2024 08:36) (108/69 - 117/69)  RR: 16 (06 May 2024 08:36) (16 - 16)  SpO2: 95% (06 May 2024 08:36) (95% - 99%)  Parameters below as of 06 May 2024 08:36  Patient On (Oxygen Delivery Method): room air      Gen - NAD, Comfortable  HEENT: NCAT, EOMI, PERRLA   Neck - Supple, No limited ROM  Pulm - CTAB,  No crackles  Cardiovascular - RRR  Abdomen - Soft, NT, ND, +BS  Extremities - No C/C/E, No calf tenderness, Left foot DF is limited   Neuro-     Cognitive - AAOx3, Anomia and wards finding difficulty is improving, (+) cognitive deficits with memory      Motor -                     LEFT    UE - ShAB 5/5, EF 5/5, EE 5/5, WE 5/5,  5/5                    RIGHT UE - ShAB 5/5, EF 5/5, EE 5/5, WE 5/5,  5/5                    LEFT    LE - HF 4/5, KE 4/5, DF 3/5, PF 4/5                    RIGHT LE - HF 4/5, KE 4/5, DF 4/5, PF 4/5        Sensory - (+) numbness on the LLE from ankle joint and all over the foot   Psychiatric - Mood stable, Affect WNL  Skin:  Wound Measures 10.5cm x4.0cm x1.4cm L thigh, old fasciotomy site present with wound Vac , Stage 1 nonblanchable pressure redness on the right ankle      Continue comprehensive acute rehab program consisting of 3hrs/day of OT/PT and SLP. HPI:  Patient is a 54 yo M with hx of CVA with residual expressive aphasia, bilateral DVTs on chronic Lovenox SQ (150Qd), compartment syndrome status post left fasciotomy (L below the knee), vertigo, seizure disorder who presented to [facility] on 4/4/24 for atraumatic left thigh swelling. CTA demonstrated a large hematoma in the left thigh w/ a small hematoma of the RLE. Patient s/p hematoma evacuation by vascular on 4/5. plastic surgery consulted and recommending outpatient follow up. IR consulted but no drainage offered. Patient s/p OR w/ vascular on 4/19 for washout after found to have multiorganism skin infection.  Patient had wound vac placed on 4/26 by wound team. Patient completed IV zosyn on 4/25 for skin infection. Patient seen by rehab team and recommended for acute inpatient rehabilitation. Patient discharged to [place] on 4/27 after medically stabilized and optimized.     Allergies  No Known Allergies    Subjective:  Patient was seen and examined at bedside.  No overnight events    Sleeping well, No complaints   Pain is well controlled with current meds   LBM (05/06), voiding without issues. Continent x 2   Participating and tolerating daily therapy, motivated to get better   Patient will be discharged with wound Vac and F/U with plastic as out patient (change x 3/ week)   Case discussed at IDT meeting today for progress and discharge plan.     ROS:  Denies CP, palpitation, cough, SOB, fever, chills, headache, dizziness, abdominal pain, dysuria, (+) joint pain      MEDICATIONS  (STANDING):  acetaminophen     Tablet .. 975 milliGRAM(s) Oral every 6 hours  aspirin  chewable 81 milliGRAM(s) Oral daily  atorvastatin 20 milliGRAM(s) Oral at bedtime  enoxaparin Injectable 40 milliGRAM(s) SubCutaneous every 24 hours  gabapentin 300 milliGRAM(s) Oral three times a day  lacosamide 150 milliGRAM(s) Oral two times a day  lidocaine   4% Patch 1 Patch Transdermal <User Schedule>  melatonin 3 milliGRAM(s) Oral at bedtime  multivitamin 1 Tablet(s) Oral daily  polyethylene glycol 3350 17 Gram(s) Oral daily  senna 2 Tablet(s) Oral at bedtime  tamsulosin 0.4 milliGRAM(s) Oral at bedtime    MEDICATIONS  (PRN):  bisacodyl 5 milliGRAM(s) Oral every 12 hours PRN Constipation  meclizine 12.5 milliGRAM(s) Oral three times a day PRN Dizziness  oxyCODONE    IR 10 milliGRAM(s) Oral every 6 hours PRN Severe Pain (7 - 10)  oxyCODONE    IR 5 milliGRAM(s) Oral every 6 hours PRN Moderate Pain (4 - 6)  traMADol 50 milliGRAM(s) Oral every 4 hours PRN Mild Pain (1 - 3)      Recent Labs:                         11.6   5.00  )-----------( 143      ( 06 May 2024 05:40 )             35.8     05-06    144  |  110<H>  |  24<H>  ----------------------------<  90  4.3   |  24  |  0.84    Ca    8.7      06 May 2024 05:40    TPro  6.7  /  Alb  2.9<L>  /  TBili  0.2  /  DBili  x   /  AST  19  /  ALT  23  /  AlkPhos  61  05-06    LIVER FUNCTIONS - ( 06 May 2024 05:40 )  Alb: 2.9 g/dL / Pro: 6.7 g/dL / ALK PHOS: 61 U/L / ALT: 23 U/L / AST: 19 U/L / GGT: x             Physical Exam:   Vital Signs Last 24 Hrs  T(C): 36.4 (07 May 2024 07:52), Max: 36.7 (06 May 2024 19:57)  T(F): 97.5 (07 May 2024 07:52), Max: 98.1 (06 May 2024 19:57)  HR: 80 (07 May 2024 07:52) (68 - 80)  BP: 118/83 (07 May 2024 07:52) (103/68 - 118/83)  RR: 16 (07 May 2024 07:52) (16 - 16)  SpO2: 100% (07 May 2024 07:52) (98% - 100%)  Parameters below as of 07 May 2024 07:52  Patient On (Oxygen Delivery Method): room air    Gen - NAD, Comfortable  HEENT: NCAT, EOMI, PERRLA   Neck - Supple, No limited ROM  Pulm - CTAB,  No crackles  Cardiovascular - RRR  Abdomen - Soft, NT, ND, +BS  Extremities - No C/C/E, No calf tenderness, Left foot DF is limited   Neuro-     Mild anomia, increase processing time      Cognitive - AAOx3, Anomia and wards finding difficulty is improving, (+) cognitive deficits with memory      Motor -                     LEFT    UE - ShAB 5/5, EF 5/5, EE 5/5, WE 5/5,  5/5                    RIGHT UE - ShAB 5/5, EF 5/5, EE 5/5, WE 5/5,  5/5                    LEFT    LE - HF 4/5, KE 4/5, DF 3/5, PF 4/5                    RIGHT LE - HF 4/5, KE 4/5, DF 4/5, PF 4/5        Sensory - (+) numbness on the LLE from ankle joint and all over the foot   Psychiatric - Mood stable, Affect WNL  Skin:  Wound Measures 10.5cm x4.0cm x1.4cm L thigh, old fasciotomy site present with wound Vac , Stage 1 nonblanchable pressure redness on the right ankle      Continue comprehensive acute rehab program consisting of 3hrs/day of OT/PT and SLP.

## 2024-05-08 PROCEDURE — 99232 SBSQ HOSP IP/OBS MODERATE 35: CPT

## 2024-05-08 PROCEDURE — 99232 SBSQ HOSP IP/OBS MODERATE 35: CPT | Mod: GC

## 2024-05-08 RX ORDER — GABAPENTIN 400 MG/1
0 CAPSULE ORAL
Refills: 0 | DISCHARGE

## 2024-05-08 RX ORDER — SENNA PLUS 8.6 MG/1
2 TABLET ORAL
Qty: 0 | Refills: 0 | DISCHARGE
Start: 2024-05-08

## 2024-05-08 RX ORDER — LACOSAMIDE 50 MG/1
1 TABLET ORAL
Refills: 0 | DISCHARGE

## 2024-05-08 RX ORDER — OXYCODONE HYDROCHLORIDE 5 MG/1
1 TABLET ORAL
Qty: 28 | Refills: 0
Start: 2024-05-08 | End: 2024-05-14

## 2024-05-08 RX ORDER — MECLIZINE HCL 12.5 MG
1 TABLET ORAL
Qty: 90 | Refills: 0
Start: 2024-05-08 | End: 2024-06-06

## 2024-05-08 RX ORDER — LIDOCAINE 4 G/100G
1 CREAM TOPICAL
Qty: 0 | Refills: 0 | DISCHARGE
Start: 2024-05-08

## 2024-05-08 RX ORDER — LANOLIN ALCOHOL/MO/W.PET/CERES
1 CREAM (GRAM) TOPICAL
Qty: 0 | Refills: 0 | DISCHARGE
Start: 2024-05-08

## 2024-05-08 RX ORDER — TAMSULOSIN HYDROCHLORIDE 0.4 MG/1
1 CAPSULE ORAL
Refills: 0 | DISCHARGE

## 2024-05-08 RX ORDER — ASPIRIN/CALCIUM CARB/MAGNESIUM 324 MG
1 TABLET ORAL
Qty: 30 | Refills: 1
Start: 2024-05-08 | End: 2024-07-06

## 2024-05-08 RX ORDER — ATORVASTATIN CALCIUM 80 MG/1
1 TABLET, FILM COATED ORAL
Refills: 0 | DISCHARGE

## 2024-05-08 RX ORDER — TAMSULOSIN HYDROCHLORIDE 0.4 MG/1
1 CAPSULE ORAL
Qty: 30 | Refills: 1
Start: 2024-05-08 | End: 2024-07-06

## 2024-05-08 RX ORDER — ENOXAPARIN SODIUM 100 MG/ML
40 INJECTION SUBCUTANEOUS
Qty: 30 | Refills: 1
Start: 2024-05-08 | End: 2024-07-12

## 2024-05-08 RX ORDER — ACETAMINOPHEN 500 MG
3 TABLET ORAL
Qty: 0 | Refills: 0 | DISCHARGE
Start: 2024-05-08

## 2024-05-08 RX ORDER — ATORVASTATIN CALCIUM 80 MG/1
1 TABLET, FILM COATED ORAL
Qty: 30 | Refills: 1
Start: 2024-05-08 | End: 2024-07-06

## 2024-05-08 RX ORDER — POLYETHYLENE GLYCOL 3350 17 G/17G
17 POWDER, FOR SOLUTION ORAL
Qty: 0 | Refills: 0 | DISCHARGE
Start: 2024-05-08

## 2024-05-08 RX ORDER — GABAPENTIN 400 MG/1
1 CAPSULE ORAL
Qty: 90 | Refills: 1
Start: 2024-05-08 | End: 2024-07-06

## 2024-05-08 RX ADMIN — Medication 975 MILLIGRAM(S): at 23:32

## 2024-05-08 RX ADMIN — GABAPENTIN 300 MILLIGRAM(S): 400 CAPSULE ORAL at 15:43

## 2024-05-08 RX ADMIN — GABAPENTIN 300 MILLIGRAM(S): 400 CAPSULE ORAL at 05:30

## 2024-05-08 RX ADMIN — Medication 975 MILLIGRAM(S): at 19:00

## 2024-05-08 RX ADMIN — GABAPENTIN 300 MILLIGRAM(S): 400 CAPSULE ORAL at 22:10

## 2024-05-08 RX ADMIN — TRAMADOL HYDROCHLORIDE 50 MILLIGRAM(S): 50 TABLET ORAL at 14:09

## 2024-05-08 RX ADMIN — Medication 81 MILLIGRAM(S): at 11:53

## 2024-05-08 RX ADMIN — Medication 1 TABLET(S): at 11:53

## 2024-05-08 RX ADMIN — Medication 975 MILLIGRAM(S): at 11:53

## 2024-05-08 RX ADMIN — POLYETHYLENE GLYCOL 3350 17 GRAM(S): 17 POWDER, FOR SOLUTION ORAL at 11:53

## 2024-05-08 RX ADMIN — ATORVASTATIN CALCIUM 20 MILLIGRAM(S): 80 TABLET, FILM COATED ORAL at 22:10

## 2024-05-08 RX ADMIN — LIDOCAINE 1 PATCH: 4 CREAM TOPICAL at 05:31

## 2024-05-08 RX ADMIN — Medication 975 MILLIGRAM(S): at 18:17

## 2024-05-08 RX ADMIN — ENOXAPARIN SODIUM 40 MILLIGRAM(S): 100 INJECTION SUBCUTANEOUS at 22:08

## 2024-05-08 RX ADMIN — TAMSULOSIN HYDROCHLORIDE 0.4 MILLIGRAM(S): 0.4 CAPSULE ORAL at 22:09

## 2024-05-08 RX ADMIN — Medication 975 MILLIGRAM(S): at 06:28

## 2024-05-08 RX ADMIN — Medication 975 MILLIGRAM(S): at 05:30

## 2024-05-08 RX ADMIN — LACOSAMIDE 150 MILLIGRAM(S): 50 TABLET ORAL at 18:17

## 2024-05-08 RX ADMIN — Medication 3 MILLIGRAM(S): at 22:09

## 2024-05-08 RX ADMIN — LACOSAMIDE 150 MILLIGRAM(S): 50 TABLET ORAL at 05:30

## 2024-05-08 NOTE — PROGRESS NOTE ADULT - SUBJECTIVE AND OBJECTIVE BOX
HPI:  Patient is a 52 yo M with hx of CVA with residual expressive aphasia, bilateral DVTs on chronic Lovenox SQ (150Qd), compartment syndrome status post left fasciotomy (L below the knee), vertigo, seizure disorder who presented to [facility] on 4/4/24 for atraumatic left thigh swelling. CTA demonstrated a large hematoma in the left thigh w/ a small hematoma of the RLE. Patient s/p hematoma evacuation by vascular on 4/5. plastic surgery consulted and recommending outpatient follow up. IR consulted but no drainage offered. Patient s/p OR w/ vascular on 4/19 for washout after found to have multiorganism skin infection.  Patient had wound vac placed on 4/26 by wound team. Patient completed IV zosyn on 4/25 for skin infection. Patient seen by rehab team and recommended for acute inpatient rehabilitation. Patient discharged to [facility] on 4/27 after medically stabilized and optimized.     Allergies  No Known Allergies    Subjective:  Patient was seen and examined at therapy.  No overnight events    Sleeping well, No complaints   Pain is well controlled with current meds   LBM (05/07), voiding without issues. Continent x 2   Participating and tolerating daily therapy, motivated to get better   Patient will be discharged with wound Vac and F/U with plastic as out patient (change x 3/ week)     ROS:  Denies CP, palpitation, cough, SOB, fever, chills, headache, dizziness, abdominal pain, dysuria, (+) joint pain      MEDICATIONS  (STANDING):  acetaminophen     Tablet .. 975 milliGRAM(s) Oral every 6 hours  aspirin  chewable 81 milliGRAM(s) Oral daily  atorvastatin 20 milliGRAM(s) Oral at bedtime  enoxaparin Injectable 40 milliGRAM(s) SubCutaneous every 24 hours  gabapentin 300 milliGRAM(s) Oral three times a day  lacosamide 150 milliGRAM(s) Oral two times a day  lidocaine   4% Patch 1 Patch Transdermal <User Schedule>  melatonin 3 milliGRAM(s) Oral at bedtime  multivitamin 1 Tablet(s) Oral daily  polyethylene glycol 3350 17 Gram(s) Oral daily  senna 2 Tablet(s) Oral at bedtime  tamsulosin 0.4 milliGRAM(s) Oral at bedtime    MEDICATIONS  (PRN):  bisacodyl 5 milliGRAM(s) Oral every 12 hours PRN Constipation  meclizine 12.5 milliGRAM(s) Oral three times a day PRN Dizziness  oxyCODONE    IR 10 milliGRAM(s) Oral every 6 hours PRN Severe Pain (7 - 10)  oxyCODONE    IR 5 milliGRAM(s) Oral every 6 hours PRN Moderate Pain (4 - 6)  traMADol 50 milliGRAM(s) Oral every 4 hours PRN Mild Pain (1 - 3)      Recent Labs:                         11.6   5.00  )-----------( 143      ( 06 May 2024 05:40 )             35.8     05-06    144  |  110<H>  |  24<H>  ----------------------------<  90  4.3   |  24  |  0.84    Ca    8.7      06 May 2024 05:40    TPro  6.7  /  Alb  2.9<L>  /  TBili  0.2  /  DBili  x   /  AST  19  /  ALT  23  /  AlkPhos  61  05-06    LIVER FUNCTIONS - ( 06 May 2024 05:40 )  Alb: 2.9 g/dL / Pro: 6.7 g/dL / ALK PHOS: 61 U/L / ALT: 23 U/L / AST: 19 U/L / GGT: x             Physical Exam:   Vital Signs Last 24 Hrs  T(C): 36.4 (08 May 2024 08:26), Max: 36.8 (07 May 2024 20:15)  T(F): 97.6 (08 May 2024 08:26), Max: 98.3 (07 May 2024 20:15)  HR: 63 (08 May 2024 08:26) (63 - 64)  BP: 114/66 (08 May 2024 08:26) (110/71 - 114/66)  RR: 16 (08 May 2024 08:26) (16 - 16)  SpO2: 98% (08 May 2024 08:26) (98% - 98%)  Parameters below as of 08 May 2024 08:26  Patient On (Oxygen Delivery Method): room air      Gen - NAD, Comfortable  HEENT: NCAT, EOMI, PERRLA   Neck - Supple, No limited ROM  Pulm - CTAB,  No crackles  Cardiovascular - RRR  Abdomen - Soft, NT, ND, +BS  Extremities - No C/C/E, No calf tenderness, Left foot DF is limited   Neuro-     Mild anomia, increase processing time      Cognitive - AAOx3, Anomia and wards finding difficulty is improving, (+) cognitive deficits with memory      Motor -                     LEFT    UE - ShAB 5/5, EF 5/5, EE 5/5, WE 5/5,  5/5                    RIGHT UE - ShAB 5/5, EF 5/5, EE 5/5, WE 5/5,  5/5                    LEFT    LE - HF 4/5, KE 4/5, DF 3/5, PF 4/5                    RIGHT LE - HF 4/5, KE 4/5, DF 4/5, PF 4/5        Sensory - (+) numbness on the LLE from ankle joint and all over the foot   Psychiatric - Mood stable, Affect WNL  Skin:  Wound Measures 10.5cm x4.0cm x1.4cm L thigh, old fasciotomy site present with wound Vac , Stage 1 nonblanchable pressure redness on the right ankle      Continue comprehensive acute rehab program consisting of 3hrs/day of OT/PT and SLP.

## 2024-05-08 NOTE — PROGRESS NOTE ADULT - TIME BILLING
- Ordering, reviewing, and interpreting labs, testing, and imaging.  - Independently obtaining a review of systems and performing a physical exam  - Reviewing consultant documentation/recommendations in addition to discussing plan of care with consultants.  - Counselling and educating patient and family regarding interpretation of aforementioned items and plan of care.
- Ordering, reviewing, and interpreting labs, testing, and imaging.  - Independently obtaining a review of systems and performing a physical exam  - Reviewing prior hospitalization and where necessary, outpatient records.  - Counselling and educating patient and family regarding interpretation of aforementioned items and plan of care.
- Ordering, reviewing, and interpreting labs, testing, and imaging.  - Independently obtaining a review of systems and performing a physical exam  - Reviewing prior hospitalization and where necessary, outpatient records.  - Counselling and educating patient and family regarding interpretation of aforementioned items and plan of care.
The necessity of the time spent during the encounter on this date of service was due to:     - Ordering, reviewing, and interpreting labs, testing, and imaging.  - Independently obtaining a review of systems and performing a physical exam  - Reviewing prior hospitalization and where necessary, outpatient records.  - Counselling and educating patient and family regarding interpretation of aforementioned items and plan of care.
- Ordering, reviewing, and interpreting labs, testing, and imaging.  - Independently obtaining a review of systems and performing a physical exam  - Reviewing prior hospitalization and where necessary, outpatient records.  - Counselling and educating patient and family regarding interpretation of aforementioned items and plan of care.

## 2024-05-08 NOTE — PROGRESS NOTE ADULT - ASSESSMENT
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  Patient now admitted for a multidisciplinary rehab program.       #Altered mental status, likely sec to metabolic encephalopathy due to opioids now resolved.   - No evidence of acute infection  - Mental status back to baseline  - monitor for excessive sedation on oxycodone. taper off if possible.    #L thigh hematoma s/p evacuation  #weakness, debility  #skin infection  - s/p hematoma evacuation w/ vascular on 4/5 c/b multiorganism infection s/p wash out on 4/19  - s/p zosyn completed on 4/25 per ID recommendation  - cont wound vac  - comprehensive rehab for weakness, walks with walker  - pain management    # Vertigo  - one episode of recurrent vertigo 5/6, now resolved   - continue with meclizine PRN.     # CVA  - continue aspirin 81mg, statin    # ADHD  - home med: Vyvanse 50mg daily    # Seizure disorder  - Vimpat 150mg BID    # HLD  - Lipitor 20mg    # DVT ppx  - SCDs, no pharm ppx at this time

## 2024-05-08 NOTE — PROGRESS NOTE ADULT - SUBJECTIVE AND OBJECTIVE BOX
Patient is a 53y old  Male who presents with a chief complaint of Hematoma s/p evacuation c/b skin infection (08 May 2024 10:45)      Patient seen and examined at bedside.  - no events overnight, no nausea, vomiting, headaches, shortness of breath, cough, chest pain. states his vertigo symptoms have resolved.   ALLERGIES:  No Known Allergies    MEDICATIONS  (STANDING):  acetaminophen     Tablet .. 975 milliGRAM(s) Oral every 6 hours  aspirin  chewable 81 milliGRAM(s) Oral daily  atorvastatin 20 milliGRAM(s) Oral at bedtime  enoxaparin Injectable 40 milliGRAM(s) SubCutaneous every 24 hours  gabapentin 300 milliGRAM(s) Oral three times a day  lacosamide 150 milliGRAM(s) Oral two times a day  lidocaine   4% Patch 1 Patch Transdermal <User Schedule>  melatonin 3 milliGRAM(s) Oral at bedtime  multivitamin 1 Tablet(s) Oral daily  polyethylene glycol 3350 17 Gram(s) Oral daily  senna 2 Tablet(s) Oral at bedtime  tamsulosin 0.4 milliGRAM(s) Oral at bedtime    MEDICATIONS  (PRN):  bisacodyl 5 milliGRAM(s) Oral every 12 hours PRN Constipation  meclizine 12.5 milliGRAM(s) Oral three times a day PRN Dizziness  oxyCODONE    IR 10 milliGRAM(s) Oral every 6 hours PRN Severe Pain (7 - 10)  oxyCODONE    IR 5 milliGRAM(s) Oral every 6 hours PRN Moderate Pain (4 - 6)  traMADol 50 milliGRAM(s) Oral every 4 hours PRN Mild Pain (1 - 3)    Vital Signs Last 24 Hrs  T(F): 97.6 (08 May 2024 08:26), Max: 98.3 (07 May 2024 20:15)  HR: 63 (08 May 2024 08:26) (63 - 64)  BP: 114/66 (08 May 2024 08:26) (110/71 - 114/66)  RR: 16 (08 May 2024 08:26) (16 - 16)  SpO2: 98% (08 May 2024 08:26) (98% - 98%)  I&O's Summary    07 May 2024 07:01  -  08 May 2024 07:00  --------------------------------------------------------  IN: 0 mL / OUT: 700 mL / NET: -700 mL          PHYSICAL EXAM:  General: NAD, A/O x 3  ENT: MMM  Neck: Supple, No JVD  Lungs: Clear to auscultation bilaterally  Cardio: RRR, S1/S2, No murmurs  Abdomen: Soft, Nontender, Nondistended; Bowel sounds present  Extremities: No calf tenderness, No pitting edema    LABS:                        11.6   5.00  )-----------( 143      ( 06 May 2024 05:40 )             35.8       05-06    144  |  110  |  24  ----------------------------<  90  4.3   |  24  |  0.84    Ca    8.7      06 May 2024 05:40    TPro  6.7  /  Alb  2.9  /  TBili  0.2  /  DBili  x   /  AST  19  /  ALT  23  /  AlkPhos  61  05-06                                  Urinalysis Basic - ( 06 May 2024 05:40 )    Color: x / Appearance: x / SG: x / pH: x  Gluc: 90 mg/dL / Ketone: x  / Bili: x / Urobili: x   Blood: x / Protein: x / Nitrite: x   Leuk Esterase: x / RBC: x / WBC x   Sq Epi: x / Non Sq Epi: x / Bacteria: x

## 2024-05-08 NOTE — PROGRESS NOTE ADULT - ASSESSMENT
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  Patient now admitted for a multidisciplinary rehab program. 04-27    Hematoma s/p evacuation  Comprehensive Multidisciplinary Rehab Program:  - comprehensive rehab program of PT/OT/SLP - 3 hours a day, 5 days a week.   - s/p hematoma evacuation w/ vascular on 4/5  - plastics consulted for wound eval and recommended outpatient appt  - IR consulted but no intervention recommended  - Vascular w/ washout on 4/19 for skin infection  - Wound vac placed on 4/26, change x 3/ week- wound care F/U   - Aspirin 81mg  - Cleared for DVT ppx w/Lovenox-only.   - Resume AC for CVA ppx per Vascular plan     Dizziness/ better  - Meclizine one dose (05/06) with good response   - Zofran   - Monitor     Worsening Apraxia/AMS  - CTH, no acute findings (5/2)  - urinalysis negative  - likely associated with pain medications     Skin Infection  -Surgical swab w/ multi organism infection  -S/P zosyn, completed abx on 4/25    Seizure disorder  -Vimpat 150mg BID    ADHD  -Vyvanse 50mg daily     Vertigo  -Meclizine prn     HLD  - Lipitor 20mg    Pain  - Neurontin 300 mg po x 3  - Lidocaine patch to foot  - Tylenol PRN and oxycodone prn  - Avoid sedating medications that may interfere with cognitive recovery    GI / Bowel  - Senna qHS  - Miralax Daily    Skin / Pressure injury  - Skin assessment on admission performed: No pressure injury  - Monitor Incisions:  wound vac and incision on the left thigh  - Turn q2 hours in bed while awake, air mattress  - nursing to monitor skin qShift  - soft heel protectors  - skin barrier cream PRN    DVT prophylaxis:   - Lovenox  - Last Doppler on 4/6 -> negative for B/L LE DVTs      Outpatient Follow-up:  UROLOGY [address] [place] [address]  Scheduled Appointment: 05/13/2024    [facility]  UROLOGY [address]  Scheduled Appointment: 05/13/2024    Rodolfo Johnson  Internal Medicine  [address] [facility], [address] [place] [address]  Phone: [phone]  Fax: [phone]  Follow Up Time:     Mariah Christie  Vascular Surgery  [address] [facility], [address]  [place] [address]  Phone: [phone]  Fax: [phone]  Follow Up Time:

## 2024-05-09 ENCOUNTER — TRANSCRIPTION ENCOUNTER (OUTPATIENT)
Age: 54
End: 2024-05-09

## 2024-05-09 LAB
ALBUMIN SERPL ELPH-MCNC: 3 G/DL — LOW (ref 3.3–5)
ALP SERPL-CCNC: 69 U/L — SIGNIFICANT CHANGE UP (ref 40–120)
ALT FLD-CCNC: 27 U/L — SIGNIFICANT CHANGE UP (ref 10–45)
ANION GAP SERPL CALC-SCNC: 6 MMOL/L — SIGNIFICANT CHANGE UP (ref 5–17)
AST SERPL-CCNC: 18 U/L — SIGNIFICANT CHANGE UP (ref 10–40)
BASOPHILS # BLD AUTO: 0.07 K/UL — SIGNIFICANT CHANGE UP (ref 0–0.2)
BASOPHILS NFR BLD AUTO: 1.5 % — SIGNIFICANT CHANGE UP (ref 0–2)
BILIRUB SERPL-MCNC: 0.2 MG/DL — SIGNIFICANT CHANGE UP (ref 0.2–1.2)
BUN SERPL-MCNC: 17 MG/DL — SIGNIFICANT CHANGE UP (ref 7–23)
CALCIUM SERPL-MCNC: 8.5 MG/DL — SIGNIFICANT CHANGE UP (ref 8.4–10.5)
CHLORIDE SERPL-SCNC: 105 MMOL/L — SIGNIFICANT CHANGE UP (ref 96–108)
CO2 SERPL-SCNC: 30 MMOL/L — SIGNIFICANT CHANGE UP (ref 22–31)
CREAT SERPL-MCNC: 1.03 MG/DL — SIGNIFICANT CHANGE UP (ref 0.5–1.3)
EGFR: 87 ML/MIN/1.73M2 — SIGNIFICANT CHANGE UP
EOSINOPHIL # BLD AUTO: 0.46 K/UL — SIGNIFICANT CHANGE UP (ref 0–0.5)
EOSINOPHIL NFR BLD AUTO: 9.9 % — HIGH (ref 0–6)
GLUCOSE SERPL-MCNC: 93 MG/DL — SIGNIFICANT CHANGE UP (ref 70–99)
HCT VFR BLD CALC: 36.9 % — LOW (ref 39–50)
HGB BLD-MCNC: 11.9 G/DL — LOW (ref 13–17)
IMM GRANULOCYTES NFR BLD AUTO: 0.4 % — SIGNIFICANT CHANGE UP (ref 0–0.9)
LYMPHOCYTES # BLD AUTO: 1.26 K/UL — SIGNIFICANT CHANGE UP (ref 1–3.3)
LYMPHOCYTES # BLD AUTO: 27.2 % — SIGNIFICANT CHANGE UP (ref 13–44)
MCHC RBC-ENTMCNC: 28.7 PG — SIGNIFICANT CHANGE UP (ref 27–34)
MCHC RBC-ENTMCNC: 32.2 GM/DL — SIGNIFICANT CHANGE UP (ref 32–36)
MCV RBC AUTO: 89.1 FL — SIGNIFICANT CHANGE UP (ref 80–100)
MONOCYTES # BLD AUTO: 0.42 K/UL — SIGNIFICANT CHANGE UP (ref 0–0.9)
MONOCYTES NFR BLD AUTO: 9.1 % — SIGNIFICANT CHANGE UP (ref 2–14)
NEUTROPHILS # BLD AUTO: 2.41 K/UL — SIGNIFICANT CHANGE UP (ref 1.8–7.4)
NEUTROPHILS NFR BLD AUTO: 51.9 % — SIGNIFICANT CHANGE UP (ref 43–77)
NRBC # BLD: 0 /100 WBCS — SIGNIFICANT CHANGE UP (ref 0–0)
PLATELET # BLD AUTO: 162 K/UL — SIGNIFICANT CHANGE UP (ref 150–400)
POTASSIUM SERPL-MCNC: 4 MMOL/L — SIGNIFICANT CHANGE UP (ref 3.5–5.3)
POTASSIUM SERPL-SCNC: 4 MMOL/L — SIGNIFICANT CHANGE UP (ref 3.5–5.3)
PROT SERPL-MCNC: 6.8 G/DL — SIGNIFICANT CHANGE UP (ref 6–8.3)
RBC # BLD: 4.14 M/UL — LOW (ref 4.2–5.8)
RBC # FLD: 14.6 % — HIGH (ref 10.3–14.5)
SODIUM SERPL-SCNC: 141 MMOL/L — SIGNIFICANT CHANGE UP (ref 135–145)
WBC # BLD: 4.64 K/UL — SIGNIFICANT CHANGE UP (ref 3.8–10.5)
WBC # FLD AUTO: 4.64 K/UL — SIGNIFICANT CHANGE UP (ref 3.8–10.5)

## 2024-05-09 PROCEDURE — 99239 HOSP IP/OBS DSCHRG MGMT >30: CPT | Mod: GC

## 2024-05-09 PROCEDURE — 99232 SBSQ HOSP IP/OBS MODERATE 35: CPT

## 2024-05-09 RX ORDER — LACOSAMIDE 50 MG/1
1 TABLET ORAL
Qty: 60 | Refills: 1
Start: 2024-05-09 | End: 2024-07-07

## 2024-05-09 RX ORDER — ENOXAPARIN SODIUM 100 MG/ML
40 INJECTION SUBCUTANEOUS
Qty: 0 | Refills: 0 | DISCHARGE

## 2024-05-09 RX ORDER — TRAMADOL HYDROCHLORIDE 50 MG/1
1 TABLET ORAL
Qty: 42 | Refills: 0
Start: 2024-05-09 | End: 2024-05-15

## 2024-05-09 RX ORDER — OXYCODONE HYDROCHLORIDE 5 MG/1
1 TABLET ORAL
Qty: 28 | Refills: 0
Start: 2024-05-09 | End: 2024-05-15

## 2024-05-09 RX ADMIN — Medication 975 MILLIGRAM(S): at 12:15

## 2024-05-09 RX ADMIN — Medication 81 MILLIGRAM(S): at 11:35

## 2024-05-09 RX ADMIN — GABAPENTIN 300 MILLIGRAM(S): 400 CAPSULE ORAL at 06:12

## 2024-05-09 RX ADMIN — Medication 975 MILLIGRAM(S): at 11:33

## 2024-05-09 RX ADMIN — LACOSAMIDE 150 MILLIGRAM(S): 50 TABLET ORAL at 17:25

## 2024-05-09 RX ADMIN — GABAPENTIN 300 MILLIGRAM(S): 400 CAPSULE ORAL at 21:13

## 2024-05-09 RX ADMIN — Medication 1 TABLET(S): at 11:33

## 2024-05-09 RX ADMIN — Medication 975 MILLIGRAM(S): at 06:12

## 2024-05-09 RX ADMIN — GABAPENTIN 300 MILLIGRAM(S): 400 CAPSULE ORAL at 15:20

## 2024-05-09 RX ADMIN — Medication 3 MILLIGRAM(S): at 21:14

## 2024-05-09 RX ADMIN — Medication 975 MILLIGRAM(S): at 23:29

## 2024-05-09 RX ADMIN — LACOSAMIDE 150 MILLIGRAM(S): 50 TABLET ORAL at 06:12

## 2024-05-09 RX ADMIN — Medication 975 MILLIGRAM(S): at 00:30

## 2024-05-09 RX ADMIN — TAMSULOSIN HYDROCHLORIDE 0.4 MILLIGRAM(S): 0.4 CAPSULE ORAL at 21:13

## 2024-05-09 RX ADMIN — ATORVASTATIN CALCIUM 20 MILLIGRAM(S): 80 TABLET, FILM COATED ORAL at 21:13

## 2024-05-09 RX ADMIN — ENOXAPARIN SODIUM 40 MILLIGRAM(S): 100 INJECTION SUBCUTANEOUS at 21:14

## 2024-05-09 RX ADMIN — Medication 975 MILLIGRAM(S): at 17:25

## 2024-05-09 RX ADMIN — Medication 975 MILLIGRAM(S): at 07:00

## 2024-05-09 RX ADMIN — Medication 975 MILLIGRAM(S): at 23:59

## 2024-05-09 NOTE — DISCHARGE NOTE NURSING/CASE MANAGEMENT/SOCIAL WORK - OTHER MODE OF TRANSPORTATION
family car pt 60y m with squamous cell carcinoma over left eyebrow. sent in for admission by Dr. Bull for excision in collaboration with Dr. Morgan   pt has no complaints  Dr. Madden aware pt in ED. will admit to medicine  case d/w dr madden surgical oncologist   Dr. Kinsey:  I have reviewed and discussed with the PA/ resident the case specifics, including the history, physical assessment, evaluation, conclusion, laboratory results, and medical plan. I agree with the contents, and conclusions. I have personally examined, and interviewed the patient.

## 2024-05-09 NOTE — PROGRESS NOTE ADULT - SUBJECTIVE AND OBJECTIVE BOX
Patient is a 53y old  Male who presents with a chief complaint of Hematoma s/p evacuation c/b skin infection    Denies chest pain, SOB  Tolerating diet      Patient seen and examined at bedside.    ALLERGIES:  No Known Allergies    MEDICATIONS  (STANDING):  acetaminophen     Tablet .. 975 milliGRAM(s) Oral every 6 hours  aspirin  chewable 81 milliGRAM(s) Oral daily  atorvastatin 20 milliGRAM(s) Oral at bedtime  enoxaparin Injectable 40 milliGRAM(s) SubCutaneous every 24 hours  gabapentin 300 milliGRAM(s) Oral three times a day  lacosamide 150 milliGRAM(s) Oral two times a day  lidocaine   4% Patch 1 Patch Transdermal <User Schedule>  melatonin 3 milliGRAM(s) Oral at bedtime  multivitamin 1 Tablet(s) Oral daily  polyethylene glycol 3350 17 Gram(s) Oral daily  senna 2 Tablet(s) Oral at bedtime  tamsulosin 0.4 milliGRAM(s) Oral at bedtime    MEDICATIONS  (PRN):  bisacodyl 5 milliGRAM(s) Oral every 12 hours PRN Constipation  meclizine 12.5 milliGRAM(s) Oral three times a day PRN Dizziness  oxyCODONE    IR 10 milliGRAM(s) Oral every 6 hours PRN Severe Pain (7 - 10)  oxyCODONE    IR 5 milliGRAM(s) Oral every 6 hours PRN Moderate Pain (4 - 6)  traMADol 50 milliGRAM(s) Oral every 4 hours PRN Mild Pain (1 - 3)    Vital Signs Last 24 Hrs  T(F): 97.8 (09 May 2024 07:59), Max: 98.1 (08 May 2024 22:40)  HR: 56 (09 May 2024 07:59) (56 - 63)  BP: 126/79 (09 May 2024 07:59) (118/77 - 126/79)  RR: 16 (09 May 2024 07:59) (16 - 16)  SpO2: 100% (09 May 2024 07:59) (97% - 100%)  I&O's Summary      PHYSICAL EXAM:  General: NAD, A/O x 3  ENT: MMM  Neck: Supple, No JVD  Lungs: Clear to auscultation bilaterally  Cardio: RRR, S1/S2, No murmurs  Abdomen: Soft, Nontender, Nondistended; Bowel sounds present  Extremities: No calf tenderness, No pitting edema      LABS:                        11.9   4.64  )-----------( 162      ( 09 May 2024 05:40 )             36.9       05-09    141  |  105  |  17  ----------------------------<  93  4.0   |  30  |  1.03    Ca    8.5      09 May 2024 05:40    TPro  6.8  /  Alb  3.0  /  TBili  0.2  /  DBili  x   /  AST  18  /  ALT  27  /  AlkPhos  69  05-09     rinalysis Basic - ( 09 May 2024 05:40 )    Color: x / Appearance: x / SG: x / pH: x  Gluc: 93 mg/dL / Ketone: x  / Bili: x / Urobili: x   Blood: x / Protein: x / Nitrite: x   Leuk Esterase: x / RBC: x / WBC x   Sq Epi: x / Non Sq Epi: x / Bacteria: x    COVID-19 PCR: NotDetec (05-10-23 @ 07:31)  COVID-19 PCR: NotDetec (02-05-23 @ 11:43)  COVID-19 PCR: NotDetec (01-30-23 @ 16:10)  COVID-19 PCR: NotDetec (01-22-23 @ 07:25)  COVID-19 PCR: NotDetec (01-11-23 @ 11:19)  COVID-19 PCR: Detected (12-26-22 @ 11:10)  COVID-19 PCR: NotDetec (11-04-22 @ 20:34)

## 2024-05-09 NOTE — PROGRESS NOTE ADULT - ASSESSMENT
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  Patient now admitted for a multidisciplinary rehab program. 04-27    Hematoma s/p evacuation  Comprehensive Multidisciplinary Rehab Program:  - comprehensive rehab program of PT/OT/SLP - 3 hours a day, 5 days a week--> to be continued with [facility]  - s/p hematoma evacuation w/ vascular on 4/5  - plastics consulted for wound eval and recommended outpatient appt  - IR consulted but no intervention recommended  - Vascular w/ washout on 4/19 for skin infection  - Wound vac placed on 4/26, change x 3/ week- wound care F/U--> the plan is to be discharged on wet to dry dressing till wound vac delivered to his home. Will contact Plastic/wound surgery for a plan of dressing till we get the insurance approval   - Aspirin 81mg  - Cleared for DVT ppx w/Lovenox-only.   - Resume AC for CVA ppx per Vascular plan     Dizziness/ better  - Meclizine one dose (05/06) with good response   - Zofran     Worsening Apraxia/AMS--> Resolved   - CTH, no acute findings (5/2)  - urinalysis negative  - likely associated with pain medications     Skin Infection  -Surgical swab w/ multi organism infection  -S/P zosyn, completed abx on 4/25    Seizure disorder  -Vimpat 150mg BID    ADHD  -Vyvanse 50mg daily     Vertigo  -Meclizine prn     HLD  - Lipitor 20mg    Pain  - Neurontin 300 mg po x 3  - Lidocaine patch to foot  - Tylenol PRN and oxycodone prn  - Avoid sedating medications that may interfere with cognitive recovery    GI / Bowel  - Senna qHS  - Miralax Daily    Skin / Pressure injury  - Skin assessment on admission performed: No pressure injury  - Monitor Incisions:  wound vac and incision on the left thigh--> will D/C and discharge on wet to dry dressing. Plastic for a plan.   - Turn q2 hours in bed while awake, air mattress  - soft heel protectors  - skin barrier cream PRN    DVT prophylaxis:   - Lovenox  - Last Doppler on 4/6 -> negative for B/L LE DVTs      Outpatient Follow-up:  UROLOGY [address]  Scheduled Appointment: 05/13/2024    [facility]  UROLOGY [address]  Scheduled Appointment: 05/13/2024    Rodolfo Johnson  Internal Medicine  [address] [address]  Phone: [phone]  Fax: [phone]  Follow Up Time:     Mariah Christie  Vascular Surgery  [address] [facility], [address]  [place] [address]  Phone: [phone]  Fax: [phone]  Follow Up Time:     Assessment/Plan:  TAMI DUNHAM is a 53y with hx of CVA w/ residual expressive aphasia, 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  Patient now admitted for a multidisciplinary rehab program. 04-27    Hematoma s/p evacuation  Comprehensive Multidisciplinary Rehab Program:  - comprehensive rehab program of PT/OT/SLP - 3 hours a day, 5 days a week--> to be continued with [facility]  - s/p hematoma evacuation w/ vascular on 4/5  - plastics consulted for wound eval and recommended outpatient appt  - IR consulted but no intervention recommended  - Vascular w/ washout on 4/19 for skin infection  - Wound vac placed on 4/26, change x 3/ week- wound care F/U--> the plan is to be discharged on wet to dry dressing till wound vac delivered to his home.    - Aspirin 81mg  - Cleared for DVT ppx w/Lovenox-only.   - Resume AC for CVA ppx per Vascular plan     Dizziness/ better  - Meclizine one dose (05/06) with good response   - Zofran     Worsening Apraxia/AMS--> Resolved   - CTH, no acute findings (5/2)  - urinalysis negative  - likely associated with pain medications     Skin Infection  -Surgical swab w/ multi organism infection  -S/P zosyn, completed abx on 4/25    Seizure disorder  -Vimpat 150mg BID    ADHD  -Vyvanse 50mg daily     Vertigo  -Meclizine prn     HLD  - Lipitor 20mg    Pain  - Neurontin 300 mg po x 3  - Lidocaine patch to foot  - Tylenol PRN and oxycodone prn  - Avoid sedating medications that may interfere with cognitive recovery    GI / Bowel  - Senna qHS  - Miralax Daily    Skin / Pressure injury  - Skin assessment on admission performed: No pressure injury  - Monitor Incisions:  wound vac and incision on the left thigh--> will D/C and discharge on wet to dry dressing. Wound vac will be delivered to patient's house   - Turn q2 hours in bed while awake, air mattress  - soft heel protectors  - skin barrier cream PRN    DVT prophylaxis:   - Lovenox  - Last Doppler on 4/6 -> negative for B/L LE DVTs      Outpatient Follow-up:  UROLOGY [address] [facility]  Scheduled Appointment: 05/13/2024    [facility]  UROLOGY [address] [facility]  Scheduled Appointment: 05/13/2024    Rodolfo Johnson  Internal Medicine  [address] [address]  Phone: [phone]  Fax: [phone]  Follow Up Time:     Mariah Christie  Vascular Surgery  [address] [place] [address], [address]  [place] [address]  Phone: [phone]  Fax: [phone]  Follow Up Time:

## 2024-05-09 NOTE — PROGRESS NOTE ADULT - ASSESSMENT
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  Patient now admitted for a multidisciplinary rehab program.       #Altered mental status, likely sec to metabolic encephalopathy due to opioids now resolved.   - No evidence of acute infection  - Mental status back to baseline    #L thigh hematoma s/p evacuation  #weakness, debility  #skin infection  - s/p hematoma evacuation w/ vascular on 4/5 c/b multiorganism infection s/p wash out on 4/19  - s/p zosyn completed on 4/25 per ID recommendation  - cont wound vac    # Vertigo  - one episode of recurrent vertigo 5/6, now resolved   - continue with meclizine PRN.     # CVA  - continue aspirin 81mg, statin    # ADHD  - home med: Vyvanse 50mg daily    # Seizure disorder  - Vimpat 150mg BID    # HLD  - Lipitor 20mg    DVT ppx- Lovenox

## 2024-05-09 NOTE — PROGRESS NOTE ADULT - SUBJECTIVE AND OBJECTIVE BOX
HPI:  Patient is a 52 yo M with hx of CVA with residual expressive aphasia, bilateral DVTs on chronic Lovenox SQ (150Qd), compartment syndrome status post left fasciotomy (L below the knee), vertigo, seizure disorder who presented to [facility] on 4/4/24 for atraumatic left thigh swelling. CTA demonstrated a large hematoma in the left thigh w/ a small hematoma of the RLE. Patient s/p hematoma evacuation by vascular on 4/5. plastic surgery consulted and recommending outpatient follow up. IR consulted but no drainage offered. Patient s/p OR w/ vascular on 4/19 for washout after found to have multiorganism skin infection.  Patient had wound vac placed on 4/26 by wound team. Patient completed IV zosyn on 4/25 for skin infection. Patient seen by rehab team and recommended for acute inpatient rehabilitation. Patient discharged to [facility] on 4/27 after medically stabilized and optimized.     Allergies  No Known Allergies    Subjective:  Patient was seen and examined at therapy.  No overnight events    Sleeping well, No complaints, happy to go home today    Pain is well controlled with current meds   LBM (05/08), voiding without issues. Continent x 2   Participating and tolerating daily therapy, motivated to get better   Discharge is scheduled for today, wound vac is still pending insurance approval, will contact Plastic/wound surgery for plan.     ROS:  Denies CP, palpitation, cough, SOB, fever, chills, headache, dizziness, abdominal pain, dysuria, (+) joint pain      MEDICATIONS  (STANDING):  acetaminophen     Tablet .. 975 milliGRAM(s) Oral every 6 hours  aspirin  chewable 81 milliGRAM(s) Oral daily  atorvastatin 20 milliGRAM(s) Oral at bedtime  enoxaparin Injectable 40 milliGRAM(s) SubCutaneous every 24 hours  gabapentin 300 milliGRAM(s) Oral three times a day  lacosamide 150 milliGRAM(s) Oral two times a day  lidocaine   4% Patch 1 Patch Transdermal <User Schedule>  melatonin 3 milliGRAM(s) Oral at bedtime  multivitamin 1 Tablet(s) Oral daily  polyethylene glycol 3350 17 Gram(s) Oral daily  senna 2 Tablet(s) Oral at bedtime  tamsulosin 0.4 milliGRAM(s) Oral at bedtime    MEDICATIONS  (PRN):  bisacodyl 5 milliGRAM(s) Oral every 12 hours PRN Constipation  meclizine 12.5 milliGRAM(s) Oral three times a day PRN Dizziness  oxyCODONE    IR 10 milliGRAM(s) Oral every 6 hours PRN Severe Pain (7 - 10)  oxyCODONE    IR 5 milliGRAM(s) Oral every 6 hours PRN Moderate Pain (4 - 6)  traMADol 50 milliGRAM(s) Oral every 4 hours PRN Mild Pain (1 - 3)      Recent Labs:                         11.9   4.64  )-----------( 162      ( 09 May 2024 05:40 )             36.9     05-09    141  |  105  |  17  ----------------------------<  93  4.0   |  30  |  1.03    Ca    8.5      09 May 2024 05:40    TPro  6.8  /  Alb  3.0<L>  /  TBili  0.2  /  DBili  x   /  AST  18  /  ALT  27  /  AlkPhos  69  05-09    LIVER FUNCTIONS - ( 09 May 2024 05:40 )  Alb: 3.0 g/dL / Pro: 6.8 g/dL / ALK PHOS: 69 U/L / ALT: 27 U/L / AST: 18 U/L / GGT: x             Physical Exam:   Vital Signs Last 24 Hrs  T(C): 36.6 (09 May 2024 07:59), Max: 36.7 (08 May 2024 22:40)  T(F): 97.8 (09 May 2024 07:59), Max: 98.1 (08 May 2024 22:40)  HR: 56 (09 May 2024 07:59) (56 - 63)  BP: 126/79 (09 May 2024 07:59) (118/77 - 126/79)  RR: 16 (09 May 2024 07:59) (16 - 16)  SpO2: 100% (09 May 2024 07:59) (97% - 100%)  Parameters below as of 09 May 2024 07:59  Patient On (Oxygen Delivery Method): room air      Gen - NAD, Comfortable  HEENT: NCAT, EOMI, PERRLA   Neck - Supple, No limited ROM  Pulm - CTAB,  No crackles  Cardiovascular - RRR  Abdomen - Soft, NT, ND, +BS  Extremities - No C/C/E, No calf tenderness, Left foot DF is limited   Neuro-     Mild anomia, increase processing time      Cognitive - AAOx3, Anomia and wards finding difficulty is improving, (+) cognitive deficits with memory      Motor -                     LEFT    UE - ShAB 5/5, EF 5/5, EE 5/5, WE 5/5,  5/5                    RIGHT UE - ShAB 5/5, EF 5/5, EE 5/5, WE 5/5,  5/5                    LEFT    LE - HF 4/5, KE 4/5, DF 3/5, PF 4/5                    RIGHT LE - HF 4/5, KE 4/5, DF 4/5, PF 4/5        Sensory - (+) numbness on the LLE from ankle joint and all over the foot   Psychiatric - Mood stable, Affect WNL  Skin:  Wound Measures 10.5cm x4.0cm x1.4cm L thigh, old fasciotomy site present with wound Vac , Stage 1 nonblanchable pressure redness on the right ankle      Continue comprehensive acute rehab program consisting of 3hrs/day of OT/PT and SLP. HPI:  Patient is a 52 yo M with hx of CVA with residual expressive aphasia, bilateral DVTs on chronic Lovenox SQ (150Qd), compartment syndrome status post left fasciotomy (L below the knee), vertigo, seizure disorder who presented to [facility] on 4/4/24 for atraumatic left thigh swelling. CTA demonstrated a large hematoma in the left thigh w/ a small hematoma of the RLE. Patient s/p hematoma evacuation by vascular on 4/5. plastic surgery consulted and recommending outpatient follow up. IR consulted but no drainage offered. Patient s/p OR w/ vascular on 4/19 for washout after found to have multiorganism skin infection.  Patient had wound vac placed on 4/26 by wound team. Patient completed IV zosyn on 4/25 for skin infection. Patient seen by rehab team and recommended for acute inpatient rehabilitation. Patient discharged to [facility] on 4/27 after medically stabilized and optimized.     Allergies  No Known Allergies    Subjective:  Patient was seen and examined at therapy.  No overnight events    Sleeping well, No complaints, happy to go home today    Pain is well controlled with current meds   LBM (05/08), voiding without issues. Continent x 2   Participating and tolerating daily therapy, motivated to get better   Discharge is scheduled for today, wound vac will be delivered to patient's house     ROS:  Denies CP, palpitation, cough, SOB, fever, chills, headache, dizziness, abdominal pain, dysuria, (+) joint pain      MEDICATIONS  (STANDING):  acetaminophen     Tablet .. 975 milliGRAM(s) Oral every 6 hours  aspirin  chewable 81 milliGRAM(s) Oral daily  atorvastatin 20 milliGRAM(s) Oral at bedtime  enoxaparin Injectable 40 milliGRAM(s) SubCutaneous every 24 hours  gabapentin 300 milliGRAM(s) Oral three times a day  lacosamide 150 milliGRAM(s) Oral two times a day  lidocaine   4% Patch 1 Patch Transdermal <User Schedule>  melatonin 3 milliGRAM(s) Oral at bedtime  multivitamin 1 Tablet(s) Oral daily  polyethylene glycol 3350 17 Gram(s) Oral daily  senna 2 Tablet(s) Oral at bedtime  tamsulosin 0.4 milliGRAM(s) Oral at bedtime    MEDICATIONS  (PRN):  bisacodyl 5 milliGRAM(s) Oral every 12 hours PRN Constipation  meclizine 12.5 milliGRAM(s) Oral three times a day PRN Dizziness  oxyCODONE    IR 10 milliGRAM(s) Oral every 6 hours PRN Severe Pain (7 - 10)  oxyCODONE    IR 5 milliGRAM(s) Oral every 6 hours PRN Moderate Pain (4 - 6)  traMADol 50 milliGRAM(s) Oral every 4 hours PRN Mild Pain (1 - 3)      Recent Labs:                         11.9   4.64  )-----------( 162      ( 09 May 2024 05:40 )             36.9     05-09    141  |  105  |  17  ----------------------------<  93  4.0   |  30  |  1.03    Ca    8.5      09 May 2024 05:40    TPro  6.8  /  Alb  3.0<L>  /  TBili  0.2  /  DBili  x   /  AST  18  /  ALT  27  /  AlkPhos  69  05-09    LIVER FUNCTIONS - ( 09 May 2024 05:40 )  Alb: 3.0 g/dL / Pro: 6.8 g/dL / ALK PHOS: 69 U/L / ALT: 27 U/L / AST: 18 U/L / GGT: x             Physical Exam:   Vital Signs Last 24 Hrs  T(C): 36.6 (09 May 2024 07:59), Max: 36.7 (08 May 2024 22:40)  T(F): 97.8 (09 May 2024 07:59), Max: 98.1 (08 May 2024 22:40)  HR: 56 (09 May 2024 07:59) (56 - 63)  BP: 126/79 (09 May 2024 07:59) (118/77 - 126/79)  RR: 16 (09 May 2024 07:59) (16 - 16)  SpO2: 100% (09 May 2024 07:59) (97% - 100%)  Parameters below as of 09 May 2024 07:59  Patient On (Oxygen Delivery Method): room air      Gen - NAD, Comfortable  HEENT: NCAT, EOMI, PERRLA   Neck - Supple, No limited ROM  Pulm - CTAB,  No crackles  Cardiovascular - RRR  Abdomen - Soft, NT, ND, +BS  Extremities - No C/C/E, No calf tenderness, Left foot DF is limited   Neuro-     Mild anomia, increase processing time      Cognitive - AAOx3, Anomia and wards finding difficulty is improving, (+) cognitive deficits with memory      Motor -                     LEFT    UE - ShAB 5/5, EF 5/5, EE 5/5, WE 5/5,  5/5                    RIGHT UE - ShAB 5/5, EF 5/5, EE 5/5, WE 5/5,  5/5                    LEFT    LE - HF 4/5, KE 4/5, DF 3/5, PF 4/5                    RIGHT LE - HF 4/5, KE 4/5, DF 4/5, PF 4/5        Sensory - (+) numbness on the LLE from ankle joint and all over the foot   Psychiatric - Mood stable, Affect WNL  Skin:  Wound Measures 10.5cm x4.0cm x1.4cm L thigh, old fasciotomy site present with wound Vac , Stage 1 nonblanchable pressure redness on the right ankle      Continue comprehensive acute rehab program consisting of 3hrs/day of OT/PT and SLP.

## 2024-05-09 NOTE — DISCHARGE NOTE NURSING/CASE MANAGEMENT/SOCIAL WORK - PATIENT PORTAL LINK FT
You can access the FollowMyHealth Patient Portal offered by [facility] by registering at the following website: [email or web address]. By joining [facility]’s FollowMyHealth portal, you will also be able to view your health information using other applications (apps) compatible with our system.

## 2024-05-10 VITALS
DIASTOLIC BLOOD PRESSURE: 62 MMHG | RESPIRATION RATE: 16 BRPM | TEMPERATURE: 98 F | OXYGEN SATURATION: 100 % | HEART RATE: 55 BPM | SYSTOLIC BLOOD PRESSURE: 103 MMHG

## 2024-05-10 PROCEDURE — 92507 TX SP LANG VOICE COMM INDIV: CPT | Mod: GN

## 2024-05-10 PROCEDURE — 99232 SBSQ HOSP IP/OBS MODERATE 35: CPT | Mod: GC

## 2024-05-10 PROCEDURE — 80053 COMPREHEN METABOLIC PANEL: CPT

## 2024-05-10 PROCEDURE — 97535 SELF CARE MNGMENT TRAINING: CPT | Mod: GO

## 2024-05-10 PROCEDURE — 36415 COLL VENOUS BLD VENIPUNCTURE: CPT

## 2024-05-10 PROCEDURE — 97530 THERAPEUTIC ACTIVITIES: CPT | Mod: GP

## 2024-05-10 PROCEDURE — 97112 NEUROMUSCULAR REEDUCATION: CPT | Mod: GP

## 2024-05-10 PROCEDURE — 97165 OT EVAL LOW COMPLEX 30 MIN: CPT | Mod: GO

## 2024-05-10 PROCEDURE — 87637 SARSCOV2&INF A&B&RSV AMP PRB: CPT

## 2024-05-10 PROCEDURE — 97116 GAIT TRAINING THERAPY: CPT | Mod: GP

## 2024-05-10 PROCEDURE — 97161 PT EVAL LOW COMPLEX 20 MIN: CPT | Mod: GP

## 2024-05-10 PROCEDURE — 70450 CT HEAD/BRAIN W/O DYE: CPT | Mod: MC

## 2024-05-10 PROCEDURE — 82803 BLOOD GASES ANY COMBINATION: CPT

## 2024-05-10 PROCEDURE — 97110 THERAPEUTIC EXERCISES: CPT | Mod: GP

## 2024-05-10 PROCEDURE — 92610 EVALUATE SWALLOWING FUNCTION: CPT | Mod: GN

## 2024-05-10 PROCEDURE — 92523 SPEECH SOUND LANG COMPREHEN: CPT | Mod: GN

## 2024-05-10 PROCEDURE — 0241U: CPT

## 2024-05-10 PROCEDURE — 85025 COMPLETE CBC W/AUTO DIFF WBC: CPT

## 2024-05-10 PROCEDURE — 99232 SBSQ HOSP IP/OBS MODERATE 35: CPT

## 2024-05-10 PROCEDURE — 36600 WITHDRAWAL OF ARTERIAL BLOOD: CPT

## 2024-05-10 RX ADMIN — Medication 975 MILLIGRAM(S): at 11:17

## 2024-05-10 RX ADMIN — Medication 975 MILLIGRAM(S): at 07:20

## 2024-05-10 RX ADMIN — GABAPENTIN 300 MILLIGRAM(S): 400 CAPSULE ORAL at 06:31

## 2024-05-10 RX ADMIN — LACOSAMIDE 150 MILLIGRAM(S): 50 TABLET ORAL at 06:31

## 2024-05-10 RX ADMIN — Medication 975 MILLIGRAM(S): at 06:31

## 2024-05-10 RX ADMIN — Medication 81 MILLIGRAM(S): at 11:17

## 2024-05-10 RX ADMIN — Medication 1 TABLET(S): at 11:18

## 2024-05-10 NOTE — PROGRESS NOTE ADULT - REASON FOR ADMISSION
Hematoma s/p evacuation c/b skin infection

## 2024-05-10 NOTE — PROGRESS NOTE ADULT - PROVIDER SPECIALTY LIST ADULT
Physiatry
Rehab Medicine
Hospitalist
Rehab Medicine
Hospitalist
Physiatry
Rehab Medicine
Rehab Medicine
Hospitalist
Hospitalist
Physiatry
Rehab Medicine
Hospitalist
Physiatry
Rehab Medicine
Rehab Medicine
Hospitalist
Physiatry
Hospitalist

## 2024-05-10 NOTE — PROGRESS NOTE ADULT - NUTRITIONAL ASSESSMENT
This patient has been assessed with a concern for Malnutrition and has been determined to have a diagnosis/diagnoses of Severe protein-calorie malnutrition.    This patient is being managed with:   Diet Regular-  Supplement Feeding Modality:  Oral  Ensure Max Cans or Servings Per Day:  1       Frequency:  Daily  Entered: Apr 29 2024 12:34PM  
This patient has been assessed with a concern for Malnutrition and has been determined to have a diagnosis/diagnoses of Severe protein-calorie malnutrition.    This patient is being managed with:   Diet Regular-  Supplement Feeding Modality:  Oral  Ensure Max Cans or Servings Per Day:  1       Frequency:  Daily  Entered: Apr 29 2024 12:34PM    Diet Regular-  Entered: Apr 27 2024  3:26PM    The following pending diet order is being considered for treatment of Severe protein-calorie malnutrition:null
This patient has been assessed with a concern for Malnutrition and has been determined to have a diagnosis/diagnoses of Severe protein-calorie malnutrition.    This patient is being managed with:   Diet Regular-  Supplement Feeding Modality:  Oral  Ensure Max Cans or Servings Per Day:  1       Frequency:  Daily  Entered: Apr 29 2024 12:34PM  
This patient has been assessed with a concern for Malnutrition and has been determined to have a diagnosis/diagnoses of Severe protein-calorie malnutrition.    This patient is being managed with:   Diet Regular-  Supplement Feeding Modality:  Oral  Ensure Max Cans or Servings Per Day:  1       Frequency:  Daily  Entered: Apr 29 2024 12:34PM    Diet Regular-  Entered: Apr 27 2024  3:26PM    The following pending diet order is being considered for treatment of Severe protein-calorie malnutrition:null
This patient has been assessed with a concern for Malnutrition and has been determined to have a diagnosis/diagnoses of Severe protein-calorie malnutrition.    This patient is being managed with:   Diet Regular-  Supplement Feeding Modality:  Oral  Ensure Max Cans or Servings Per Day:  1       Frequency:  Daily  Entered: Apr 29 2024 12:34PM  
This patient has been assessed with a concern for Malnutrition and has been determined to have a diagnosis/diagnoses of Severe protein-calorie malnutrition.    This patient is being managed with:   Diet Regular-  Supplement Feeding Modality:  Oral  Ensure Max Cans or Servings Per Day:  1       Frequency:  Daily  Entered: Apr 29 2024 12:34PM    Diet Regular-  Entered: Apr 27 2024  3:26PM    The following pending diet order is being considered for treatment of Severe protein-calorie malnutrition:null

## 2024-05-10 NOTE — PROGRESS NOTE ADULT - NSPROGADDITIONALINFOA_GEN_ALL_CORE
Spent 35 minutes on face-face visit, evaluate, examine and treat
Spent 1 hour on evaluating, examining and with team meeting where we discussed patient's progress and discharge plan.
Spent 35 minutes on face-face visit, evaluate, examine and treat
Spent 50 minutes on face-face visit, evaluating, examining, preparing discharge papers, discuss discharge meds, wound dressing plan and F/U visits
Spent 30 minutes on face-face visit, evaluate, examine and treat, excluding teaching time
Spent 35 minutes on face-face visit, evaluate, examine and treat
Spent 35 minutes on face-face visit, evaluate, examine and treat
Spent 1 hour on evaluating, examining and with team meeting where we discussed patient's progress and discharge plan.  Excluding teaching time
Spent 35 minutes on face-face visit, evaluate, examine and treat, excluding teaching time   Patient was seen and examined on (05/02), note signed today 2/2 technical issues

## 2024-05-10 NOTE — PROGRESS NOTE ADULT - SUBJECTIVE AND OBJECTIVE BOX
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 CTA demonstrated a large hematoma in the left thigh w/ a small hematoma of the RLE. Patient s/p hematoma evacuation by vascular on 4/5. plastic surgery consulted and recommending outpatient follow up. IR consulted but no drainage offered. Patient s/p OR w/ vascular on 4/19 for washout after found to have multiorganism skin infection.  Patient had wound vac placed on 4/26 by wound team. Patient completed IV zosyn on 4/25 for skin infection. Patient seen by rehab team and recommended for acute inpatient rehabilitation. Patient discharged to [place] on 4/27 after medically stabilized and optimized.     Allergies  No Known Allergies    Subjective:  Patient was seen and examined at bedside.  No overnight events    Sleeping well, No complaints   Pain is well controlled with current meds   LBM (05/08), voiding without issues. Continent x 2   Participating and tolerating daily therapy, motivated to get better   Discharge is scheduled for today as his wound vac was not delivered. His Discharge was held for today     ROS:  Denies CP, palpitation, cough, SOB, fever, chills, headache, dizziness, abdominal pain, dysuria, joint pain      MEDICATIONS  (STANDING):  acetaminophen     Tablet .. 975 milliGRAM(s) Oral every 6 hours  aspirin  chewable 81 milliGRAM(s) Oral daily  atorvastatin 20 milliGRAM(s) Oral at bedtime  enoxaparin Injectable 40 milliGRAM(s) SubCutaneous every 24 hours  gabapentin 300 milliGRAM(s) Oral three times a day  lacosamide 150 milliGRAM(s) Oral two times a day  lidocaine   4% Patch 1 Patch Transdermal <User Schedule>  melatonin 3 milliGRAM(s) Oral at bedtime  multivitamin 1 Tablet(s) Oral daily  polyethylene glycol 3350 17 Gram(s) Oral daily  senna 2 Tablet(s) Oral at bedtime  tamsulosin 0.4 milliGRAM(s) Oral at bedtime    MEDICATIONS  (PRN):  bisacodyl 5 milliGRAM(s) Oral every 12 hours PRN Constipation  meclizine 12.5 milliGRAM(s) Oral three times a day PRN Dizziness  oxyCODONE    IR 10 milliGRAM(s) Oral every 6 hours PRN Severe Pain (7 - 10)  oxyCODONE    IR 5 milliGRAM(s) Oral every 6 hours PRN Moderate Pain (4 - 6)  traMADol 50 milliGRAM(s) Oral every 4 hours PRN Mild Pain (1 - 3)      Recent Labs:                         11.9   4.64  )-----------( 162      ( 09 May 2024 05:40 )             36.9     05-09    141  |  105  |  17  ----------------------------<  93  4.0   |  30  |  1.03    Ca    8.5      09 May 2024 05:40    TPro  6.8  /  Alb  3.0<L>  /  TBili  0.2  /  DBili  x   /  AST  18  /  ALT  27  /  AlkPhos  69  05-09    LIVER FUNCTIONS - ( 09 May 2024 05:40 )  Alb: 3.0 g/dL / Pro: 6.8 g/dL / ALK PHOS: 69 U/L / ALT: 27 U/L / AST: 18 U/L / GGT: x             Physical Exam:   Vital Signs Last 24 Hrs  T(C): 36.5 (10 May 2024 07:26), Max: 36.7 (09 May 2024 21:21)  T(F): 97.7 (10 May 2024 07:26), Max: 98 (09 May 2024 21:21)  HR: 55 (10 May 2024 07:26) (55 - 69)  BP: 103/62 (10 May 2024 07:26) (103/62 - 118/82)  RR: 16 (10 May 2024 07:26) (16 - 16)  SpO2: 100% (10 May 2024 07:26) (97% - 100%)  Parameters below as of 10 May 2024 07:26  Patient On (Oxygen Delivery Method): room air      Gen - NAD, Comfortable  HEENT: NCAT, EOMI, PERRLA   Neck - Supple, No limited ROM  Pulm - CTAB,  No crackles  Cardiovascular - RRR  Abdomen - Soft, NT, ND, +BS  Extremities - No C/C/E, No calf tenderness, Left foot DF is limited   Neuro-     Mild anomia, increase processing time      Cognitive - AAOx3, Anomia and wards finding difficulty is improving, (+) cognitive deficits with memory      Motor -                     LEFT    UE - ShAB 5/5, EF 5/5, EE 5/5, WE 5/5,  5/5                    RIGHT UE - ShAB 5/5, EF 5/5, EE 5/5, WE 5/5,  5/5                    LEFT    LE - HF 4/5, KE 4/5, DF 3/5, PF 4/5                    RIGHT LE - HF 4/5, KE 4/5, DF 4/5, PF 4/5        Sensory - (+) numbness on the LLE from ankle joint and all over the foot   Psychiatric - Mood stable, Affect WNL  Skin:  Wound Measures 10.5cm x4.0cm x1.4cm L thigh, old fasciotomy site present with wound Vac , Stage 1 nonblanchable pressure redness on the right ankle      Continue comprehensive acute rehab program consisting of 3hrs/day of OT/PT and SLP.

## 2024-05-10 NOTE — PROGRESS NOTE ADULT - ASSESSMENT
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  Patient now admitted for a multidisciplinary rehab program. 04-27    Hematoma s/p evacuation  Comprehensive Multidisciplinary Rehab Program:  - comprehensive rehab program of PT/OT/SLP - 3 hours a day, 5 days a week--> to be continued with [facility]  - s/p hematoma evacuation w/ vascular on 4/5  - plastics consulted for wound eval and recommended outpatient appt  - IR consulted but no intervention recommended  - Vascular w/ washout on 4/19 for skin infection  - Wound vac placed on 4/26, change x 3/ week- wound care F/U.   - Aspirin 81mg  - Cleared for DVT ppx w/Lovenox-only.   - Resume AC for CVA ppx per Vascular plan     Dizziness/ better  - Meclizine one dose (05/06) with good response   - Zofran     Worsening Apraxia/AMS--> Resolved   - CTH, no acute findings (5/2)  - urinalysis negative  - likely associated with pain medications     Skin Infection  -Surgical swab w/ multi organism infection  -S/P zosyn, completed abx on 4/25    Seizure disorder  -Vimpat 150mg BID    ADHD  -Vyvanse 50mg daily     Vertigo  -Meclizine prn     HLD  - Lipitor 20mg    Pain  - Neurontin 300 mg po x 3  - Lidocaine patch to foot  - Tylenol PRN and oxycodone prn  - Avoid sedating medications that may interfere with cognitive recovery    GI / Bowel  - Senna qHS  - Miralax Daily    Skin / Pressure injury  - Skin assessment on admission performed: No pressure injury  - Monitor Incisions:  wound vac and incision on the left thigh   - Turn q2 hours in bed while awake, air mattress  - soft heel protectors  - skin barrier cream PRN    DVT prophylaxis:   - Lovenox  - Last Doppler on 4/6 -> negative for B/L LE DVTs      Outpatient Follow-up:  UROLOGY [address] [facility]  Scheduled Appointment: 05/13/2024    Doni Walker Physician [facility]  UROLOGY [address] [place] [address]  Scheduled Appointment: 05/13/2024    Rodolfo Johnson  Internal Medicine  [address] [address]  Phone: [phone]  Fax: [phone]  Follow Up Time:     Mariah Christie  Vascular Surgery  [address] [facility], [address]  [place] [address]  Phone: [phone]  Fax: [phone]  Follow Up Time:

## 2024-05-10 NOTE — PROGRESS NOTE ADULT - SUBJECTIVE AND OBJECTIVE BOX
Patient is a 53y old  Male who presents with a chief complaint of Hematoma s/p evacuation c/b skin infection (10 May 2024 10:30)      Patient seen and examined at bedside.    ALLERGIES:  No Known Allergies    MEDICATIONS  (STANDING):  acetaminophen     Tablet .. 975 milliGRAM(s) Oral every 6 hours  aspirin  chewable 81 milliGRAM(s) Oral daily  atorvastatin 20 milliGRAM(s) Oral at bedtime  enoxaparin Injectable 40 milliGRAM(s) SubCutaneous every 24 hours  gabapentin 300 milliGRAM(s) Oral three times a day  lacosamide 150 milliGRAM(s) Oral two times a day  lidocaine   4% Patch 1 Patch Transdermal <User Schedule>  melatonin 3 milliGRAM(s) Oral at bedtime  multivitamin 1 Tablet(s) Oral daily  polyethylene glycol 3350 17 Gram(s) Oral daily  senna 2 Tablet(s) Oral at bedtime  tamsulosin 0.4 milliGRAM(s) Oral at bedtime    MEDICATIONS  (PRN):  bisacodyl 5 milliGRAM(s) Oral every 12 hours PRN Constipation  meclizine 12.5 milliGRAM(s) Oral three times a day PRN Dizziness    Vital Signs Last 24 Hrs  T(F): 97.7 (10 May 2024 07:26), Max: 98 (09 May 2024 21:21)  HR: 55 (10 May 2024 07:26) (55 - 69)  BP: 103/62 (10 May 2024 07:26) (103/62 - 118/82)  RR: 16 (10 May 2024 07:26) (16 - 16)  SpO2: 100% (10 May 2024 07:26) (97% - 100%)  I&O's Summary    09 May 2024 07:01  -  10 May 2024 07:00  --------------------------------------------------------  IN: 0 mL / OUT: 900 mL / NET: -900 mL        PHYSICAL EXAM:  General: NAD, A/O x 3  ENT: MMM, no scleral icterus  Neck: Supple, No JVD, no thyroidomegaly  Lungs: Clear to auscultation bilaterally, no wheezes, no rales, no rhonchi, good inspiratory effort  Cardio: RRR, S1/S2, No murmurs  Abdomen: Soft, Nontender, Nondistended; Bowel sounds present  Extremities: No calf tenderness, No pitting edema, no skin changes    LABS:                        11.9   4.64  )-----------( 162      ( 09 May 2024 05:40 )             36.9       05-09    141  |  105  |  17  ----------------------------<  93  4.0   |  30  |  1.03    Ca    8.5      09 May 2024 05:40    TPro  6.8  /  Alb  3.0  /  TBili  0.2  /  DBili  x   /  AST  18  /  ALT  27  /  AlkPhos  69  05-09     Urinalysis Basic - ( 09 May 2024 05:40 )    Color: x / Appearance: x / SG: x / pH: x  Gluc: 93 mg/dL / Ketone: x  / Bili: x / Urobili: x   Blood: x / Protein: x / Nitrite: x   Leuk Esterase: x / RBC: x / WBC x   Sq Epi: x / Non Sq Epi: x / Bacteria: x    COVID-19 PCR: NotDetec (05-10-23 @ 07:31)  COVID-19 PCR: NotDetec (02-05-23 @ 11:43)  COVID-19 PCR: NotDetec (01-30-23 @ 16:10)  COVID-19 PCR: NotDetec (01-22-23 @ 07:25)  COVID-19 PCR: NotDetec (01-11-23 @ 11:19)  COVID-19 PCR: Detected (12-26-22 @ 11:10)  COVID-19 PCR: NotDetec (11-04-22 @ 20:34)

## 2024-05-11 ENCOUNTER — EMERGENCY (EMERGENCY)
Facility: HOSPITAL | Age: 54
LOS: 1 days | Discharge: HOME | End: 2024-05-11
Attending: EMERGENCY MEDICINE
Payer: MEDICAID

## 2024-05-11 VITALS
WEIGHT: 199.96 LBS | SYSTOLIC BLOOD PRESSURE: 148 MMHG | RESPIRATION RATE: 16 BRPM | HEART RATE: 95 BPM | OXYGEN SATURATION: 100 % | TEMPERATURE: 98 F | HEIGHT: 75 IN | DIASTOLIC BLOOD PRESSURE: 97 MMHG

## 2024-05-11 VITALS
TEMPERATURE: 98 F | HEART RATE: 81 BPM | OXYGEN SATURATION: 100 % | DIASTOLIC BLOOD PRESSURE: 99 MMHG | SYSTOLIC BLOOD PRESSURE: 149 MMHG | RESPIRATION RATE: 17 BRPM

## 2024-05-11 DIAGNOSIS — Z98.890 OTHER SPECIFIED POSTPROCEDURAL STATES: Chronic | ICD-10-CM

## 2024-05-11 LAB
ALBUMIN SERPL ELPH-MCNC: 4.4 G/DL — SIGNIFICANT CHANGE UP (ref 3.3–5)
ALP SERPL-CCNC: 79 U/L — SIGNIFICANT CHANGE UP (ref 40–120)
ALT FLD-CCNC: 18 U/L — SIGNIFICANT CHANGE UP (ref 10–45)
ANION GAP SERPL CALC-SCNC: 14 MMOL/L — SIGNIFICANT CHANGE UP (ref 5–17)
AST SERPL-CCNC: 15 U/L — SIGNIFICANT CHANGE UP (ref 10–40)
BASE EXCESS BLDV CALC-SCNC: 2.2 MMOL/L — SIGNIFICANT CHANGE UP (ref -2–3)
BASOPHILS # BLD AUTO: 0.06 K/UL — SIGNIFICANT CHANGE UP (ref 0–0.2)
BASOPHILS NFR BLD AUTO: 0.8 % — SIGNIFICANT CHANGE UP (ref 0–2)
BILIRUB SERPL-MCNC: 0.3 MG/DL — SIGNIFICANT CHANGE UP (ref 0.2–1.2)
BUN SERPL-MCNC: 27 MG/DL — HIGH (ref 7–23)
CA-I SERPL-SCNC: 1.25 MMOL/L — SIGNIFICANT CHANGE UP (ref 1.15–1.33)
CALCIUM SERPL-MCNC: 9.9 MG/DL — SIGNIFICANT CHANGE UP (ref 8.4–10.5)
CHLORIDE BLDV-SCNC: 104 MMOL/L — SIGNIFICANT CHANGE UP (ref 96–108)
CHLORIDE SERPL-SCNC: 104 MMOL/L — SIGNIFICANT CHANGE UP (ref 96–108)
CO2 BLDV-SCNC: 30 MMOL/L — HIGH (ref 22–26)
CO2 SERPL-SCNC: 23 MMOL/L — SIGNIFICANT CHANGE UP (ref 22–31)
CREAT SERPL-MCNC: 1.09 MG/DL — SIGNIFICANT CHANGE UP (ref 0.5–1.3)
EGFR: 81 ML/MIN/1.73M2 — SIGNIFICANT CHANGE UP
EOSINOPHIL # BLD AUTO: 0.26 K/UL — SIGNIFICANT CHANGE UP (ref 0–0.5)
EOSINOPHIL NFR BLD AUTO: 3.5 % — SIGNIFICANT CHANGE UP (ref 0–6)
GAS PNL BLDV: 138 MMOL/L — SIGNIFICANT CHANGE UP (ref 136–145)
GAS PNL BLDV: SIGNIFICANT CHANGE UP
GLUCOSE BLDV-MCNC: 94 MG/DL — SIGNIFICANT CHANGE UP (ref 70–99)
GLUCOSE SERPL-MCNC: 97 MG/DL — SIGNIFICANT CHANGE UP (ref 70–99)
HCO3 BLDV-SCNC: 28 MMOL/L — SIGNIFICANT CHANGE UP (ref 22–29)
HCT VFR BLD CALC: 39.7 % — SIGNIFICANT CHANGE UP (ref 39–50)
HCT VFR BLDA CALC: 40 % — SIGNIFICANT CHANGE UP (ref 39–51)
HGB BLD CALC-MCNC: 13.3 G/DL — SIGNIFICANT CHANGE UP (ref 12.6–17.4)
HGB BLD-MCNC: 12.8 G/DL — LOW (ref 13–17)
IMM GRANULOCYTES NFR BLD AUTO: 0.4 % — SIGNIFICANT CHANGE UP (ref 0–0.9)
LACTATE BLDV-MCNC: 1.1 MMOL/L — SIGNIFICANT CHANGE UP (ref 0.5–2)
LYMPHOCYTES # BLD AUTO: 1.3 K/UL — SIGNIFICANT CHANGE UP (ref 1–3.3)
LYMPHOCYTES # BLD AUTO: 17.4 % — SIGNIFICANT CHANGE UP (ref 13–44)
MCHC RBC-ENTMCNC: 28.4 PG — SIGNIFICANT CHANGE UP (ref 27–34)
MCHC RBC-ENTMCNC: 32.2 GM/DL — SIGNIFICANT CHANGE UP (ref 32–36)
MCV RBC AUTO: 88 FL — SIGNIFICANT CHANGE UP (ref 80–100)
MONOCYTES # BLD AUTO: 0.54 K/UL — SIGNIFICANT CHANGE UP (ref 0–0.9)
MONOCYTES NFR BLD AUTO: 7.2 % — SIGNIFICANT CHANGE UP (ref 2–14)
NEUTROPHILS # BLD AUTO: 5.26 K/UL — SIGNIFICANT CHANGE UP (ref 1.8–7.4)
NEUTROPHILS NFR BLD AUTO: 70.7 % — SIGNIFICANT CHANGE UP (ref 43–77)
NRBC # BLD: 0 /100 WBCS — SIGNIFICANT CHANGE UP (ref 0–0)
PCO2 BLDV: 49 MMHG — SIGNIFICANT CHANGE UP (ref 42–55)
PH BLDV: 7.37 — SIGNIFICANT CHANGE UP (ref 7.32–7.43)
PLATELET # BLD AUTO: 179 K/UL — SIGNIFICANT CHANGE UP (ref 150–400)
PO2 BLDV: 20 MMHG — LOW (ref 25–45)
POTASSIUM BLDV-SCNC: 4.3 MMOL/L — SIGNIFICANT CHANGE UP (ref 3.5–5.1)
POTASSIUM SERPL-MCNC: 4.4 MMOL/L — SIGNIFICANT CHANGE UP (ref 3.5–5.3)
POTASSIUM SERPL-SCNC: 4.4 MMOL/L — SIGNIFICANT CHANGE UP (ref 3.5–5.3)
PROT SERPL-MCNC: 8 G/DL — SIGNIFICANT CHANGE UP (ref 6–8.3)
RBC # BLD: 4.51 M/UL — SIGNIFICANT CHANGE UP (ref 4.2–5.8)
RBC # FLD: 15.1 % — HIGH (ref 10.3–14.5)
SAO2 % BLDV: 27.3 % — LOW (ref 67–88)
SODIUM SERPL-SCNC: 141 MMOL/L — SIGNIFICANT CHANGE UP (ref 135–145)
WBC # BLD: 7.45 K/UL — SIGNIFICANT CHANGE UP (ref 3.8–10.5)
WBC # FLD AUTO: 7.45 K/UL — SIGNIFICANT CHANGE UP (ref 3.8–10.5)

## 2024-05-11 PROCEDURE — 87040 BLOOD CULTURE FOR BACTERIA: CPT

## 2024-05-11 PROCEDURE — 99284 EMERGENCY DEPT VISIT MOD MDM: CPT | Mod: 25

## 2024-05-11 PROCEDURE — 85025 COMPLETE CBC W/AUTO DIFF WBC: CPT

## 2024-05-11 PROCEDURE — 99285 EMERGENCY DEPT VISIT HI MDM: CPT

## 2024-05-11 PROCEDURE — 36000 PLACE NEEDLE IN VEIN: CPT

## 2024-05-11 PROCEDURE — 85018 HEMOGLOBIN: CPT

## 2024-05-11 PROCEDURE — 82803 BLOOD GASES ANY COMBINATION: CPT

## 2024-05-11 PROCEDURE — 85014 HEMATOCRIT: CPT

## 2024-05-11 PROCEDURE — 80053 COMPREHEN METABOLIC PANEL: CPT

## 2024-05-11 PROCEDURE — 84132 ASSAY OF SERUM POTASSIUM: CPT

## 2024-05-11 PROCEDURE — 82435 ASSAY OF BLOOD CHLORIDE: CPT

## 2024-05-11 PROCEDURE — 82330 ASSAY OF CALCIUM: CPT

## 2024-05-11 PROCEDURE — 82947 ASSAY GLUCOSE BLOOD QUANT: CPT

## 2024-05-11 PROCEDURE — 83605 ASSAY OF LACTIC ACID: CPT

## 2024-05-11 PROCEDURE — 84295 ASSAY OF SERUM SODIUM: CPT

## 2024-05-11 RX ORDER — ACETAMINOPHEN 500 MG
975 TABLET ORAL ONCE
Refills: 0 | Status: COMPLETED | OUTPATIENT
Start: 2024-05-11 | End: 2024-05-11

## 2024-05-11 RX ADMIN — Medication 975 MILLIGRAM(S): at 21:30

## 2024-05-11 NOTE — ED PROVIDER NOTE - NSICDXFAMILYHX_GEN_ALL_CORE_FT
FAMILY HISTORY:  Known health problems: none  No pertinent family history in first degree relatives

## 2024-05-11 NOTE — ED PROVIDER NOTE - CLINICAL SUMMARY MEDICAL DECISION MAKING FREE TEXT BOX
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 Pt with no fever, vs not actionable, pt with visible brown drainage and surrounding erythema to wound. Will contact surgery for eval and assistance with vac

## 2024-05-11 NOTE — ED PROVIDER NOTE - NSICDXPASTMEDICALHX_GEN_ALL_CORE_FT
PAST MEDICAL HISTORY:  Colon polyp     CVA (cerebrovascular accident)     CVA (cerebrovascular accident)     History of TIAs     HLD (hyperlipidemia)     Seizure disorder     Vertigo

## 2024-05-11 NOTE — ED ADULT NURSE NOTE - CHIEF COMPLAINT QUOTE
s/p hematoma evacuation- thigh wound vac removal yesterday, supposed to have new wound vac placed today but visiting nurse didn't come so was told to come to ED for eval of wound

## 2024-05-11 NOTE — ED PROVIDER NOTE - NSICDXPASTSURGICALHX_GEN_ALL_CORE_FT
PAST SURGICAL HISTORY:  H/O arthroscopy of knee     H/O fasciotomy     History of fasciotomy     History of loop recorder     S/P excision of neuroma

## 2024-05-11 NOTE — ED PROVIDER NOTE - OBJECTIVE STATEMENT
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  Hematoma was drained by: COVID, patient completed course of antibiotics, was discharged to [place] [facility] for wound care.  Patient had wound VAC in place while in rehab–[facility] intention of a wound care nurse coming today to apply a home wound VAC.  Wound care nurse did not show up today–patient is noting worsening pain and serous discharge from the wound.  Patient instructed to present to the ER for wound eval.  Patient denies fever chills chest pain shortness of breath nausea vomiting. 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  Hematoma was drained by: COVID, patient completed course of antibiotics, [facility].  Patient had wound VAC in place while in rehab–[facility] with the intention of a wound care nurse coming today to apply a home wound VAC.  Wound care nurse did not show up today–patient is noting worsening pain and serous discharge from the wound.  Patient instructed to present to the ER for wound eval.  Patient denies fever chills chest pain shortness of breath nausea vomiting.

## 2024-05-11 NOTE — ED ADULT NURSE NOTE - OBJECTIVE STATEMENT
52 y/o M presents to ED with c/o worsening pain and yellowish drainage from LT thigh wound. [facility] and [facility] wound VAC in place. Pt was pending new wound vac placement by home care RN today who did not show. Pt endorses taking Tylenol for pain with minimal relief. 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 Pt denies chest pain, sob, n/v/d, trauma to thigh region, HA. Respirations are spontaneous and unlabored. Pt a&ox4, VSS see flowsheet. !8g rt ac inserted. Friend at bedside. Pt placed in position of comfort. Pt educated on call bell system and provided call bell. Bed in lowest position, wheels locked, appropriate side rails raised. Pt denies needs at this time. 52 y/o M presents to ED with c/o worsening pain and yellowish drainage from LT thigh wound. [facility] wound VAC in place. Pt was pending new wound vac placement by home care RN today who did not show. Pt endorses taking Tylenol for pain with minimal relief. 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 Pt denies chest pain, sob, n/v/d, trauma to thigh region, HA. Respirations are spontaneous and unlabored. Pt a&ox4, VSS see flowsheet. 18g rt ac inserted. Friend at bedside. Pt placed in position of comfort. Pt educated on call bell system and provided call bell. Bed in lowest position, wheels locked, appropriate side rails raised. Pt denies needs at this time.

## 2024-05-11 NOTE — ED ADULT TRIAGE NOTE - CHIEF COMPLAINT QUOTE
abdominal wound vac removal yesterday, supposed to have new wound vac placed today but visiting nurse didn't come so was told to come to ED for eval of wound s/p hematoma evacuation- thigh wound vac removal yesterday, supposed to have new wound vac placed today but visiting nurse didn't come so was told to come to ED for eval of wound

## 2024-05-11 NOTE — CONSULT NOTE ADULT - SUBJECTIVE AND OBJECTIVE BOX
Vascular Surgery Consult    Consulting attending: Pratik      HPI: Estiven Simeno is a 54 y.o. man 53y with history of CVA c/b expressive aphasia, B/L DVTs (therapeutic lovenox), LLE compartment syndrome s/p fasciotomy, and left groin hematoma s/p debridement who presented to the ED on 5/11 after a missed wound care appointment. The patient states that he was just discharged from rehab, but the wound care nurse did not come to his home to set up with wound vac.       PAST MEDICAL HISTORY:  CVA (cerebrovascular accident)  HLD (hyperlipidemia)  Vertigo  Expressive aphasia  Spinal stenosis  Seizure disorder      PAST SURGICAL HISTORY:  History of fasciotomy  H/O arthroscopy of knee  S/P excision of neuroma  History of loop recorder      MEDICATIONS:  acetaminophen 325 mg oral tablet: 3 tab(s) orally every 6 hours (08 May 2024 14:03)  Calcium tablet: 1 tablet orally once a day (04 Apr 2024 08:35)  lidocaine 4% topical film: Apply topically to affected area every 24 hours (08 May 2024 14:03)  melatonin 3 mg oral tablet: 1 tab(s) orally once a day (at bedtime) (08 May 2024 14:03)  Multiple Vitamins oral tablet: 1 tab(s) orally once a day (08 May 2024 14:03)  polyethylene glycol 3350 oral powder for reconstitution: 17 gram(s) orally once a day (08 May 2024 14:03)  senna leaf extract oral tablet: 2 tab(s) orally once a day (at bedtime) (08 May 2024 14:03)  Vyvanse 50 mg oral capsule: 1 cap(s) orally once a day (in the morning) (04 Apr 2024 08:34)      ALLERGIES:  No Known Allergies      VITALS & I/Os:  Vital Signs Last 24 Hrs  T(C): 36.6 (11 May 2024 20:25), Max: 36.8 (11 May 2024 19:10)  T(F): 97.9 (11 May 2024 20:25), Max: 98.3 (11 May 2024 19:10)  HR: 84 (11 May 2024 20:25) (84 - 95)  BP: 127/96 (11 May 2024 20:25) (127/96 - 148/97)  BP(mean): 106 (11 May 2024 20:25) (106 - 106)  RR: 15 (11 May 2024 20:25) (15 - 16)  SpO2: 100% (11 May 2024 20:25) (100% - 100%)    Parameters below as of 11 May 2024 20:25  Patient On (Oxygen Delivery Method): room air      PHYSICAL EXAM:  General: No acute distress  Respiratory: Nonlabored  Cardiovascular: normotensive, regular rate   Extremities: Warm left medial groin wound with clean granulation tissue at base, mild surrounding erythema suggestive of adhesive dermatitis       LABS:                        12.8   7.45  )-----------( 179      ( 11 May 2024 21:22 )             39.7     05-11    141  |  104  |  27<H>  ----------------------------<  97  4.4   |  23  |  1.09    Ca    9.9      11 May 2024 21:22    TPro  8.0  /  Alb  4.4  /  TBili  0.3  /  DBili  x   /  AST  15  /  ALT  18  /  AlkPhos  79  05-11    Lactate:  05-11 @ 21:22  1.1    Urinalysis Basic - ( 11 May 2024 21:22 )    Color: x / Appearance: x / SG: x / pH: x  Gluc: 97 mg/dL / Ketone: x  / Bili: x / Urobili: x   Blood: x / Protein: x / Nitrite: x   Leuk Esterase: x / RBC: x / WBC x   Sq Epi: x / Non Sq Epi: x / Bacteria: x

## 2024-05-11 NOTE — ED PROVIDER NOTE - ATTENDING CONTRIBUTION TO CARE
Attending MD Matamoros:  I have seen and examined this patient and fully participated in the care of this patient as the teaching attending. I personally made/approved the management plan and take responsibility for the patient management.      53-year-old gentleman with history of CVA, DVT on Lovenox recent hospitalization for atraumatic left thigh swelling with a CTA demonstrating a large hematoma on the left thigh it required evacuation by vascular surgery on 4/5, patient has course complicated by wound infection that required IV Zosyn, he has been intermittently having a wound VAC applied to the area, he was discharged from rehab facility today however visiting nurse who was supposed to arrive to apply the wound VAC did not show up so it was advised that patient present to the ER.    Patient on exam with normal vital signs lying in the stretcher no apparent distress.  Left upper medial thigh with occlusive dressing in place, there is brown strikethrough present on the gauze dressing.  Small amount of blanching pink erythema around the wound that is not warm to the touch, not tender.  Calf compartment is soft.  Normal affect.    Patient presenting for evaluation of ongoing wound care in the setting of failure of visiting nurse to apply wound VAC today.  Will contact vascular surgery as a new patient while in the hospital to see if they can assist with VAC placement.      *The above represents an initial assessment/impression. Please refer to progress notes for potential changes in patient clinical course*

## 2024-05-11 NOTE — ED PROVIDER NOTE - NSFOLLOWUPINSTRUCTIONS_ED_ALL_ED_FT
You were seen in the hospital with drainage from your wound.  You had blood work performed which did not indicate any systemic infection.  You were evaluated by the general surgery team who redressed your wound.  Per general surgery you can wait until Monday to have your wound VAC care initiated.  You should contact your home health agency as soon as possible to schedule this.  If you develop fever worsening pain or worsening drainage return to the emergency department to be reevaluated.  Follow-up with your primary care physician.

## 2024-05-11 NOTE — ED ADULT NURSE NOTE - NSFALLUNIVINTERV_ED_ALL_ED
Bed/Stretcher in lowest position, wheels locked, appropriate side rails in place/Call bell, personal items and telephone in reach/Instruct patient to call for assistance before getting out of bed/chair/stretcher/Non-slip footwear applied when patient is off stretcher/[place] to call system/Physically safe environment - no spills, clutter or unnecessary equipment/Purposeful proactive rounding/Room/bathroom lighting operational, light cord in reach

## 2024-05-11 NOTE — ED PROVIDER NOTE - PHYSICAL EXAMINATION
PHYSICAL EXAM:  CONSTITUTIONAL: Well appearing, awake, alert, oriented to person, place, time/situation and in no apparent distress.  HEAD: Atraumatic  EYES: Clear bilaterally, pupils equal, round and reactive to light.  CARDIAC: Normal rate, NMRG   RESPIRATORY: Breathing unlabored.  SKIN: Left anterior proximal thigh erythema and brown discharge

## 2024-05-11 NOTE — CONSULT NOTE ADULT - ASSESSMENT
ASSESSMENT:  Esitven Simeon is a 54 y.o. man 53y with history of CVA c/b expressive aphasia, B/L DVTs (therapeutic lovenox), LLE compartment syndrome s/p fasciotomy, and left groin hematoma s/p debridement who presented to the ED on 5/11 after a missed wound care appointment.      PLAN:  - Dressing applied to wound  - Wound care instructions and supplies supplied to patient and family member at bedside  - Instructed to call wound care company to establish appointment for planned wound vac  - No contraindication to discharge from vascular surgery perspective  - Plan discussed with Dr. Slim Manriquez

## 2024-05-11 NOTE — ED PROVIDER NOTE - PATIENT PORTAL LINK FT
You can access the FollowMyHealth Patient Portal offered by [facility] by registering at the following website: [email or web address]. By joining [employer]’s FollowMyHealth portal, you will also be able to view your health information using other applications (apps) compatible with our system.

## 2024-05-11 NOTE — ED ADULT NURSE NOTE - NSFALLRISKINTERV_ED_ALL_ED

## 2024-05-12 PROBLEM — I63.9 CEREBRAL INFARCTION, UNSPECIFIED: Chronic | Status: ACTIVE | Noted: 2024-04-27

## 2024-05-17 LAB
CULTURE RESULTS: SIGNIFICANT CHANGE UP
CULTURE RESULTS: SIGNIFICANT CHANGE UP
SPECIMEN SOURCE: SIGNIFICANT CHANGE UP
SPECIMEN SOURCE: SIGNIFICANT CHANGE UP

## 2024-05-18 LAB
CULTURE RESULTS: SIGNIFICANT CHANGE UP
SPECIMEN SOURCE: SIGNIFICANT CHANGE UP

## 2024-05-22 ENCOUNTER — APPOINTMENT (OUTPATIENT)
Dept: VASCULAR SURGERY | Facility: CLINIC | Age: 54
End: 2024-05-22
Payer: MEDICAID

## 2024-05-22 VITALS
SYSTOLIC BLOOD PRESSURE: 107 MMHG | TEMPERATURE: 97.7 F | DIASTOLIC BLOOD PRESSURE: 73 MMHG | HEART RATE: 80 BPM | WEIGHT: 204 LBS | HEIGHT: 75 IN | RESPIRATION RATE: 17 BRPM | BODY MASS INDEX: 25.36 KG/M2 | OXYGEN SATURATION: 97 %

## 2024-05-22 PROCEDURE — 99024 POSTOP FOLLOW-UP VISIT: CPT

## 2024-05-22 RX ORDER — SODIUM PHOSPHATE, DIBASIC AND SODIUM PHOSPHATE, MONOBASIC 7; 19 G/230ML; G/230ML
ENEMA RECTAL
Qty: 1 | Refills: 0 | Status: DISCONTINUED | COMMUNITY
Start: 2024-02-29 | End: 2024-05-22

## 2024-05-22 RX ORDER — SODIUM SULFATE, POTASSIUM SULFATE AND MAGNESIUM SULFATE 1.6; 3.13; 17.5 G/177ML; G/177ML; G/177ML
17.5-3.13-1.6 SOLUTION ORAL
Qty: 1 | Refills: 0 | Status: DISCONTINUED | COMMUNITY
Start: 2024-01-29 | End: 2024-05-22

## 2024-05-22 RX ORDER — LEVETIRACETAM 750 MG/1
750 TABLET, FILM COATED ORAL TWICE DAILY
Refills: 0 | Status: DISCONTINUED | COMMUNITY
End: 2024-05-22

## 2024-05-22 RX ORDER — SENNOSIDES 8.6 MG
8.6 TABLET ORAL
Refills: 0 | Status: DISCONTINUED | COMMUNITY
End: 2024-05-22

## 2024-05-22 RX ORDER — TAMSULOSIN HYDROCHLORIDE 0.4 MG/1
0.4 CAPSULE ORAL
Qty: 90 | Refills: 1 | Status: DISCONTINUED | COMMUNITY
Start: 2024-01-09 | End: 2024-05-22

## 2024-05-22 RX ORDER — TRIAMCINOLONE ACETONIDE 1 MG/G
0.1 CREAM TOPICAL DAILY
Qty: 1 | Refills: 0 | Status: DISCONTINUED | COMMUNITY
Start: 2023-06-12 | End: 2024-05-22

## 2024-05-22 RX ORDER — OXYCODONE 5 MG/1
5 TABLET ORAL
Refills: 0 | Status: DISCONTINUED | COMMUNITY
End: 2024-05-22

## 2024-05-22 RX ORDER — FLUOCINONIDE 0.05 MG/G
0.05 OINTMENT TOPICAL
Qty: 1 | Refills: 2 | Status: DISCONTINUED | COMMUNITY
Start: 2023-07-24 | End: 2024-05-22

## 2024-05-22 RX ORDER — TACROLIMUS 1 MG/G
0.1 OINTMENT TOPICAL
Qty: 1 | Refills: 3 | Status: DISCONTINUED | COMMUNITY
Start: 2024-02-28 | End: 2024-05-22

## 2024-05-22 RX ORDER — POLYETHYLENE GLYCOL 3350 17 G/17G
17 POWDER, FOR SOLUTION ORAL
Qty: 119 | Refills: 0 | Status: DISCONTINUED | COMMUNITY
Start: 2024-01-29 | End: 2024-05-22

## 2024-05-22 RX ORDER — PANTOPRAZOLE 40 MG/1
40 TABLET, DELAYED RELEASE ORAL TWICE DAILY
Refills: 0 | Status: DISCONTINUED | COMMUNITY
End: 2024-05-22

## 2024-05-22 RX ORDER — APIXABAN 5 MG/1
5 TABLET, FILM COATED ORAL
Qty: 90 | Refills: 2 | Status: DISCONTINUED | COMMUNITY
Start: 2022-09-08 | End: 2024-05-22

## 2024-05-23 NOTE — REASON FOR VISIT
[de-identified] : 4/5/2024 - left thigh hematoma evacuation 4/19/2024 - I&D of left thigh hematoma and seroma, wound vac placement  5/23/2024 - Pt presents for follow up. Has been doing wound vac placement on left thigh wound. Denies pain.

## 2024-05-23 NOTE — DISCUSSION/SUMMARY
[FreeTextEntry1] : s/p left thigh hematoma evac, subsequent I&D - doing well - continue wound vac - follow up in two months

## 2024-05-23 NOTE — PHYSICAL EXAM
[Calm] : calm [de-identified] : Well-appearing  [de-identified] : EOMI, anicteric  [de-identified] : left thigh wound measuring 9x3 cm with healthy granulation tissue. no foul odor. no drainage. no erythema.  [de-identified] : A&Ox4

## 2024-06-03 ENCOUNTER — INPATIENT (INPATIENT)
Facility: HOSPITAL | Age: 54
LOS: 7 days | Discharge: HOME HEALTH | DRG: 605 | End: 2024-06-11
Attending: INTERNAL MEDICINE | Admitting: INTERNAL MEDICINE
Payer: MEDICAID

## 2024-06-03 VITALS
RESPIRATION RATE: 18 BRPM | WEIGHT: 205.91 LBS | OXYGEN SATURATION: 96 % | HEART RATE: 103 BPM | TEMPERATURE: 98 F | SYSTOLIC BLOOD PRESSURE: 110 MMHG | DIASTOLIC BLOOD PRESSURE: 77 MMHG | HEIGHT: 75 IN

## 2024-06-03 DIAGNOSIS — Z98.890 OTHER SPECIFIED POSTPROCEDURAL STATES: Chronic | ICD-10-CM

## 2024-06-03 DIAGNOSIS — S40.022A CONTUSION OF LEFT UPPER ARM, INITIAL ENCOUNTER: ICD-10-CM

## 2024-06-03 LAB
ALBUMIN SERPL ELPH-MCNC: 4.3 G/DL — SIGNIFICANT CHANGE UP (ref 3.3–5)
ALP SERPL-CCNC: 84 U/L — SIGNIFICANT CHANGE UP (ref 40–120)
ALT FLD-CCNC: 29 U/L — SIGNIFICANT CHANGE UP (ref 10–45)
ANION GAP SERPL CALC-SCNC: 15 MMOL/L — SIGNIFICANT CHANGE UP (ref 5–17)
APTT BLD: 30.5 SEC — SIGNIFICANT CHANGE UP (ref 24.5–35.6)
AST SERPL-CCNC: 25 U/L — SIGNIFICANT CHANGE UP (ref 10–40)
BASOPHILS # BLD AUTO: 0.06 K/UL — SIGNIFICANT CHANGE UP (ref 0–0.2)
BASOPHILS NFR BLD AUTO: 0.7 % — SIGNIFICANT CHANGE UP (ref 0–2)
BILIRUB SERPL-MCNC: 0.4 MG/DL — SIGNIFICANT CHANGE UP (ref 0.2–1.2)
BLD GP AB SCN SERPL QL: NEGATIVE — SIGNIFICANT CHANGE UP
BUN SERPL-MCNC: 14 MG/DL — SIGNIFICANT CHANGE UP (ref 7–23)
CALCIUM SERPL-MCNC: 10 MG/DL — SIGNIFICANT CHANGE UP (ref 8.4–10.5)
CHLORIDE SERPL-SCNC: 104 MMOL/L — SIGNIFICANT CHANGE UP (ref 96–108)
CK SERPL-CCNC: 222 U/L — HIGH (ref 30–200)
CO2 SERPL-SCNC: 24 MMOL/L — SIGNIFICANT CHANGE UP (ref 22–31)
CREAT SERPL-MCNC: 1.06 MG/DL — SIGNIFICANT CHANGE UP (ref 0.5–1.3)
EGFR: 84 ML/MIN/1.73M2 — SIGNIFICANT CHANGE UP
EOSINOPHIL # BLD AUTO: 0.19 K/UL — SIGNIFICANT CHANGE UP (ref 0–0.5)
EOSINOPHIL NFR BLD AUTO: 2.3 % — SIGNIFICANT CHANGE UP (ref 0–6)
GLUCOSE SERPL-MCNC: 100 MG/DL — HIGH (ref 70–99)
HCT VFR BLD CALC: 40.3 % — SIGNIFICANT CHANGE UP (ref 39–50)
HGB BLD-MCNC: 13.1 G/DL — SIGNIFICANT CHANGE UP (ref 13–17)
IMM GRANULOCYTES NFR BLD AUTO: 0.7 % — SIGNIFICANT CHANGE UP (ref 0–0.9)
INR BLD: 1.02 RATIO — SIGNIFICANT CHANGE UP (ref 0.85–1.18)
LYMPHOCYTES # BLD AUTO: 0.87 K/UL — LOW (ref 1–3.3)
LYMPHOCYTES # BLD AUTO: 10.4 % — LOW (ref 13–44)
MCHC RBC-ENTMCNC: 28.6 PG — SIGNIFICANT CHANGE UP (ref 27–34)
MCHC RBC-ENTMCNC: 32.5 GM/DL — SIGNIFICANT CHANGE UP (ref 32–36)
MCV RBC AUTO: 88 FL — SIGNIFICANT CHANGE UP (ref 80–100)
MONOCYTES # BLD AUTO: 0.6 K/UL — SIGNIFICANT CHANGE UP (ref 0–0.9)
MONOCYTES NFR BLD AUTO: 7.2 % — SIGNIFICANT CHANGE UP (ref 2–14)
NEUTROPHILS # BLD AUTO: 6.58 K/UL — SIGNIFICANT CHANGE UP (ref 1.8–7.4)
NEUTROPHILS NFR BLD AUTO: 78.7 % — HIGH (ref 43–77)
NRBC # BLD: 0 /100 WBCS — SIGNIFICANT CHANGE UP (ref 0–0)
PLATELET # BLD AUTO: 227 K/UL — SIGNIFICANT CHANGE UP (ref 150–400)
POTASSIUM SERPL-MCNC: 4 MMOL/L — SIGNIFICANT CHANGE UP (ref 3.5–5.3)
POTASSIUM SERPL-SCNC: 4 MMOL/L — SIGNIFICANT CHANGE UP (ref 3.5–5.3)
PROT SERPL-MCNC: 7.5 G/DL — SIGNIFICANT CHANGE UP (ref 6–8.3)
PROTHROM AB SERPL-ACNC: 11.2 SEC — SIGNIFICANT CHANGE UP (ref 9.5–13)
RBC # BLD: 4.58 M/UL — SIGNIFICANT CHANGE UP (ref 4.2–5.8)
RBC # FLD: 14.8 % — HIGH (ref 10.3–14.5)
RH IG SCN BLD-IMP: NEGATIVE — SIGNIFICANT CHANGE UP
SODIUM SERPL-SCNC: 143 MMOL/L — SIGNIFICANT CHANGE UP (ref 135–145)
WBC # BLD: 8.36 K/UL — SIGNIFICANT CHANGE UP (ref 3.8–10.5)
WBC # FLD AUTO: 8.36 K/UL — SIGNIFICANT CHANGE UP (ref 3.8–10.5)

## 2024-06-03 PROCEDURE — 99291 CRITICAL CARE FIRST HOUR: CPT

## 2024-06-03 PROCEDURE — 70450 CT HEAD/BRAIN W/O DYE: CPT | Mod: 26,MC

## 2024-06-03 PROCEDURE — 73206 CT ANGIO UPR EXTRM W/O&W/DYE: CPT | Mod: 26,LT,MC

## 2024-06-03 PROCEDURE — 71045 X-RAY EXAM CHEST 1 VIEW: CPT | Mod: 26

## 2024-06-03 PROCEDURE — 72170 X-RAY EXAM OF PELVIS: CPT | Mod: 26

## 2024-06-03 PROCEDURE — 73070 X-RAY EXAM OF ELBOW: CPT | Mod: 26,LT

## 2024-06-03 PROCEDURE — 73090 X-RAY EXAM OF FOREARM: CPT | Mod: 26,LT

## 2024-06-03 RX ORDER — ACETAMINOPHEN 500 MG
1000 TABLET ORAL ONCE
Refills: 0 | Status: COMPLETED | OUTPATIENT
Start: 2024-06-03 | End: 2024-06-03

## 2024-06-03 RX ORDER — LIDOCAINE HCL 20 MG/ML
5 VIAL (ML) INJECTION ONCE
Refills: 0 | Status: DISCONTINUED | OUTPATIENT
Start: 2024-06-03 | End: 2024-06-11

## 2024-06-03 RX ADMIN — Medication 400 MILLIGRAM(S): at 18:27

## 2024-06-03 RX ADMIN — Medication 1000 MILLIGRAM(S): at 18:57

## 2024-06-03 NOTE — ED PROVIDER NOTE - CLINICAL SUMMARY MEDICAL DECISION MAKING FREE TEXT BOX
54 yo M hx of CVA w residual expressive aphasia, b/l DVTs on lovenox, LLE compartment syndrome s/p fasciotomy, left groin hematoma s/p debridement c/b overlying infection, now on wound vac pw fall mechanical today while on walker, +head strike, no loc, c/o l forearm swelling and pain. VSS - neuro intact, significant L forearm swelling tense 2+ radial pulse intact distal rom. Concern for expanding hematoma vs compartment syndrome w underlying fracture, muscle rupture. Patient neuro intact however on ac with head strike, will obtain CTH. 52 yo M hx of CVA w residual expressive aphasia, b/l DVTs on lovenox, LLE compartment syndrome s/p fasciotomy, left groin hematoma s/p debridement c/b overlying infection, now on wound vac pw fall mechanical today while on walker, +head strike, no loc, c/o l forearm swelling and pain. VSS - neuro intact, significant L forearm swelling tense 2+ radial pulse intact distal rom. Concern for expanding hematoma vs compartment syndrome w underlying fracture, muscle rupture. Patient neuro intact however on ac with head strike, will obtain CTH.  Giovanna: 53 year old male with pmhx of cva with residual expressive aphasia, b/l DVT on lovenox, LLE compartment syndrome s/p fasciotomy, left groin hematoma s/p debridement with overlying infection, now with wound vac s/p mechanical fall here with left forearm swelling and pain.  PE: alert, nad, + abrasion to left forehead, no midline c-spine tenderness, nonlabored respriaitns, + s1s2, abdomen soft nt/nd, lue: Left forearm swelling at dorsum portion with overlying small abrasion, tense hematoma but still soft athis time, soft dorsum ulnar compartment, + ttp hematoma, wristnontdener, sensation intact, + 2 radial pulse, moving fingers with no pain ahtis time, extension at wrist intact with mild pain atthis time. plan: Patient on lovenox, history of compartment syndrome, here with left forearm hematoma concern for expanding hematoma versus fracture, versus early compartment syndrome. also s/p fall on ac, will get ct head r/o ich. labs, reassess

## 2024-06-03 NOTE — CONSULT NOTE ADULT - ASSESSMENT
Localized left dorsal forearm hematoma without compartment syndrome at this time.    Will elevate forearm on pillows and blankets keeping elbow at 90 degrees.    Will reassess and if symptoms worsen and progress to compartment syndrome will consider fasciotomy.

## 2024-06-03 NOTE — ED ADULT NURSE REASSESSMENT NOTE - NS ED NURSE REASSESS COMMENT FT1
Worsening edema/bruising noted to L lateral forearm compared to initial assessment. Dr. Jean Baptiste aware and assessed pt. Worsening edema/bruising noted to L lateral forearm compared to initial assessment. Dr. Heredia aware and assessed pt.

## 2024-06-03 NOTE — ED ADULT NURSE NOTE - OBJECTIVE STATEMENT
Pt is a 52 y/o M with PMH of CVA, Seizure disorder, Vertigo, HLD, L leg fasciotomy, and neuroma excision presenting s/p mechanical fall. + AC on Lovenox and ASA. Endorses falling after walker getting caught on bump in sidewalk with no associated LOC and + head strike. Upon assessment pt is a/o x 3 speaking coherently in full sentences. Pt is breathing unlabored and equal BL in no apparent distress, radial pulses are 2+BL, abdomen is soft, nontender, and nondistended, skin is warm and dry. Abrasion noted to BL knees and L forehead. Wound vac in place from May for "hematoma in my leg" per pt report. No neurologic deficits noted up exam. Pt endorses decreased sensation in LL extremity attributed to fasciotomy, not of new onset. Pt denies fevers/chills, headaches/vision changes, chest pain/SOB, n/v/d, or changes in urinary/defecation habits. Pt is in stretcher in lowest position with side rails up. Pt is a 54 y/o M with PMH of CVA, Seizure disorder, Vertigo, HLD, L leg fasciotomy, and neuroma excision presenting s/p mechanical fall. + AC on Lovenox and ASA. Endorses falling after walker getting caught on bump in sidewalk with no associated LOC and + head strike. Upon assessment pt is a/o x 3 speaking coherently in full sentences. Pt is breathing unlabored and equal BL in no apparent distress, radial pulses are 2+BL, abdomen is soft, nontender, and nondistended, skin is warm and dry. Abrasion noted to L lateral forearm, BL knees and L forehead. Wound vac in place from May for "hematoma in my leg" per pt report. No neurologic deficits noted up exam. Pt endorses decreased sensation in LL extremity attributed to fasciotomy, not of new onset. Pt denies fevers/chills, headaches/vision changes, chest pain/SOB, n/v/d, or changes in urinary/defecation habits. Pt is in stretcher in lowest position with side rails up.

## 2024-06-03 NOTE — ED ADULT NURSE NOTE - NSFALLRISKINTERV_ED_ALL_ED

## 2024-06-03 NOTE — CONSULT NOTE ADULT - ASSESSMENT
Estiven Simeon is a 54 y.owith history of CVA c/b expressive aphasia, B/L DVTs (therapeutic lovenox), LLE compartment syndrome s/p fasciotomy, and left groin hematoma s/p debridement who presented to the ED after fall onto left upper extremity from standing.       CTA showing left subclavian, axillary, brachial and proximal radial and ulnar arteries are patent. Poor opacification of the arteries in the distal forearm and hand. There is a 9.1 x 4.1 cm hematoma in the soft tissues of the posterior forearm with scattered punctate hyperdense foci (304, 118, 119 and 122), suspicious for contrast extravasation.     There is no obvious injury to major arteries. Hematoma in the forearm secondary to trama in the setting of therapeutic AC.     Recommendations:     Patient needs assessment by hand surgery for possible left arm compartment syndrome and possible upper extremity fasciotomies.     Patient seen in the ER with Dr. Stinson at 10 pm.           Estiven Simeon is a 54 y.owith history of CVA c/b expressive aphasia, B/L DVTs (therapeutic lovenox), LLE compartment syndrome s/p fasciotomy, and left groin hematoma s/p debridement who presented to the ED after fall onto left upper extremity from standing.       CTA showing left subclavian, axillary, brachial and proximal radial and ulnar arteries are patent. Poor opacification of the arteries in the distal forearm and hand. There is a 9.1 x 4.1 cm hematoma in the soft tissues of the posterior forearm with scattered punctate hyperdense foci (304, 118, 119 and 122), suspicious for contrast extravasation.     There is no obvious injury to major arteries. Hematoma in the forearm secondary to trama in the setting of therapeutic AC.   left forearm hematoma  w  forearm discomfort   no compartment  syndrome clinically at this time in my opinion     Recommendations:     Patient needs assessment by hand surgery fo reval  possible left arm  hematoma evac and possible fsciotomy  Patient seen in the ER with Dr. Stinson at 10 pm.   please reconsult vasc surg prn

## 2024-06-03 NOTE — ED PROVIDER NOTE - PROGRESS NOTE DETAILS
Spoke to Dr. Gudino again as there are areas of active extravasation on CT. Dr. Gudino states potential reversal of lovenox, elevation of arm.     We spoke to surgery cs, recommended trauma cs. Trauma attg would like hand to see pt. Spoke to Dr. Gudino for concern for compartment syndrome/expanding hematoma - states if pt does not have an underlying fracture or muscle rupture and pt does not have pain with extension of fingers, likelihood of compartment syndrome is low.     Compared to initial exam, hematoma appears expanded.     Edita Victoria MD, PGY3 Vascular has seen pt - recommend urgent cs     Edita Victoria MD, PGY3 Vascular attg Dr. Stinson has seen pt - recommend urgent hand cs. Trauma attg Dr. Elmore at bedside.     Edita Victoria MD, PGY3 Paged Dr. Gudino once again and called cell, left message. No response.     Edita Victoria MD, PGY3 Patient seen by Dr. Gordillo, elevated arm and states will need checks overnight. Spoke with Dr. Elmore who came to bedside and collective discussion about whose service the patient will go  to.Dr. flores requested surgery team or medicine team. Spoke with [facility] and discussed with Dr. Gordillo and [facility]. [facility] agreed to take patient. Dr. Gordillo has asked ortho residents to do neurovascular checks on patietn left forearm over night. at this time, patient will be admitetd to medicine. Paged Dr. Gudino once again and called cell, left message.    Edita Victoria MD, PGY3 Patient reexamined, skin with mild bleeding now. pain with some passive extension Spoke to Dr. Gudino again as there are areas of active extravasation on CT. Dr. Gudino states potential reversal of lovenox may be necessary and elevation of arm. Will follow patient tomorrow.    We spoke to surgery cs, recommended trauma cs given fall. Trauma attg recommends hand surgery to evaluate patient. Giovanna: Vascular attg Dr. Stinson in ER, asked to avealute patient.. Evaluated patient with Dr. Stinson, and recommend urgent hand cs. Trauma attg Dr. Elmore at bedside and evaluated patient and highly recommend hand/plastics to evaluate patient. Spoke to Dr. Gudino for concern for compartment syndrome/expanding hematoma - states if pt does not have an underlying fracture or muscle rupture and pt does not have pain with extension of fingers, likelihood of compartment syndrome is low.     Compared to initial exam, hematoma appears expanded.    Edita Victoria MD, PGY3 Robin: spoke with Dr. Gordillo and is coming into hospital to examine patient. Spoke to Dr. Gudino for concern for compartment syndrome/expanding hematoma - states if pt does not have an underlying fracture or muscle rupture and pt does not have pain with extension of fingers, likelihood of compartment syndrome is low.     Compared to initial exam, hematoma appears expanded.    Edita Victoria MD, PGY3    Giovanna: gathered supplies to check for compartment syndrome. Patient reexamined, skin with mild bleeding now. pain with some passive extension Spoke to Dr. Gudino again as there are areas of active extravasation on CT. will not check with compartment needle at this time.    Dr. Gudino states potential reversal of lovenox may be necessary and elevation of arm. Will follow patient tomorrow.    We spoke to surgery cs, recommended trauma cs given fall. Trauma attg recommends hand surgery to evaluate patient. Reevaluated patient - 2+radial pulse, patient's arm is elevated. States he has pain however not significant increase. Will order for .5mg dilaudid and redose tylenol as pt was reported to not have tolerated morphine in the past    Edita Victoria MD, PGY3 Patient reexamined, skin over hematoma with mild bleeding now. pain with some passive extension Spoke to Dr. Gudino again as there are areas of active extravasation on CT.   Will not place needle in hematoma as extravasating on ct athis time.     Dr. Gudino states potential reversal of lovenox may be necessary and elevation of arm. Will see patient at later time.    We spoke to surgery cs, recommended trauma cs given fall. Trauma attg recommends hand surgery to evaluate patient. Paged Dr. Gordillo once again and called cell, left message.    Edita Victoria MD, PGY3 Patient seen by Dr. Gordillo, elevated arm and states will need checks overnight. Spoke with Dr. Elmore who came to bedside and collective discussion about whose service the patient will go  to.Dr. flores requested surgery team or medicine team. Spoke with [facility] and discussed with Dr. Gordillo and [facility]. [facility] agreed to take patient. Dr. Gordillo has asked ortho residents to do neurovascular checks on patietn left forearm over night. at this time, patient will be admitetd to medicine.  Spoke with Dr. Diez who patient is a patient of and will accept patient. Giovanna: Vascular attg Dr. Stinson in ER, asked to evaluate patient.. Evaluated patient with Dr. Stinson, and recommend urgent hand cs to see forearm. Trauma attg Dr. Elmore at bedside and evaluated patient and highly recommend hand/plastics to evaluate patient. Spoke to Dr. Gudino for concern for expanding hematoma - eval forearm for early compartment concern.  states if pt does not have an underlying fracture or muscle rupture and pt does not have pain with extension of fingers, likelihood of compartment syndrome is low.     Compared to initial exam, hematoma appears expanded.    Edita Victoria MD, PGY3  Giovanna: cta pending Patient seen by Dr. Gordillo, elevated arm and states will need checks overnight. Spoke with Dr. Elmore who came to bedside and collective discussion about admitting the patient. Dr. flores requested surgery team or medicine team. Spoke with [facility] and discussed with Dr. Gordillo and [facility]. [facility] agreed to take patient to medicine for additional workup. Dr. Gordillo has asked ortho residents to do neurovascular checks on patietn left forearm over night. at this time, patient will be admitted to medicine.  Spoke with Dr. Diez who patient is a patient of and will admit patient.

## 2024-06-03 NOTE — ED ADULT TRIAGE NOTE - CHIEF COMPLAINT QUOTE
Swelling and bruising to left arm s/p fall while using walker, no LOC.  Pt has wound vac to upper left thigh after surgical removal of hematoma. Pt takes Lovenox daily, hx of strokes.

## 2024-06-03 NOTE — CONSULT NOTE ADULT - ATTENDING COMMENTS
SHAMIKA Stinson MD have participated in the daily care of this patient and have performed a history and physical exam of the patient and discussed  the findings and plan with the house officer. I reviewed the resident note and agree with the findings and plan   I Maikel Stinson MD have personally seen and examined the patient at bedside today at  10  pm

## 2024-06-03 NOTE — CONSULT NOTE ADULT - PROBLEM SELECTOR RECOMMENDATION 9
HSAMIKA Stinson MD have participated in the daily care of this patient and have performed a history and physical exam of the patient and discussed  the findings and plan with the house officer. I reviewed the resident note and agree with the findings and plan   I Maikel Stinson MD have personally seen and examined the patient at bedside today at  10  pm

## 2024-06-04 DIAGNOSIS — G89.29 OTHER CHRONIC PAIN: ICD-10-CM

## 2024-06-04 DIAGNOSIS — Z48.89 ENCOUNTER FOR OTHER SPECIFIED SURGICAL AFTERCARE: ICD-10-CM

## 2024-06-04 DIAGNOSIS — Z29.9 ENCOUNTER FOR PROPHYLACTIC MEASURES, UNSPECIFIED: ICD-10-CM

## 2024-06-04 DIAGNOSIS — G40.909 EPILEPSY, UNSPECIFIED, NOT INTRACTABLE, WITHOUT STATUS EPILEPTICUS: ICD-10-CM

## 2024-06-04 DIAGNOSIS — Z79.899 OTHER LONG TERM (CURRENT) DRUG THERAPY: ICD-10-CM

## 2024-06-04 DIAGNOSIS — Z86.73 PERSONAL HISTORY OF TRANSIENT ISCHEMIC ATTACK (TIA), AND CEREBRAL INFARCTION WITHOUT RESIDUAL DEFICITS: ICD-10-CM

## 2024-06-04 DIAGNOSIS — S50.12XA CONTUSION OF LEFT FOREARM, INITIAL ENCOUNTER: ICD-10-CM

## 2024-06-04 LAB
ALBUMIN SERPL ELPH-MCNC: 3.7 G/DL — SIGNIFICANT CHANGE UP (ref 3.3–5)
ALP SERPL-CCNC: 78 U/L — SIGNIFICANT CHANGE UP (ref 40–120)
ALT FLD-CCNC: 22 U/L — SIGNIFICANT CHANGE UP (ref 10–45)
ANION GAP SERPL CALC-SCNC: 15 MMOL/L — SIGNIFICANT CHANGE UP (ref 5–17)
AST SERPL-CCNC: 18 U/L — SIGNIFICANT CHANGE UP (ref 10–40)
BASE EXCESS BLDV CALC-SCNC: 1.2 MMOL/L — SIGNIFICANT CHANGE UP (ref -2–3)
BILIRUB SERPL-MCNC: 0.5 MG/DL — SIGNIFICANT CHANGE UP (ref 0.2–1.2)
BUN SERPL-MCNC: 14 MG/DL — SIGNIFICANT CHANGE UP (ref 7–23)
CA-I SERPL-SCNC: 1.19 MMOL/L — SIGNIFICANT CHANGE UP (ref 1.15–1.33)
CALCIUM SERPL-MCNC: 8.8 MG/DL — SIGNIFICANT CHANGE UP (ref 8.4–10.5)
CHLORIDE BLDV-SCNC: 104 MMOL/L — SIGNIFICANT CHANGE UP (ref 96–108)
CHLORIDE SERPL-SCNC: 103 MMOL/L — SIGNIFICANT CHANGE UP (ref 96–108)
CO2 BLDV-SCNC: 30 MMOL/L — HIGH (ref 22–26)
CO2 SERPL-SCNC: 24 MMOL/L — SIGNIFICANT CHANGE UP (ref 22–31)
CREAT SERPL-MCNC: 0.95 MG/DL — SIGNIFICANT CHANGE UP (ref 0.5–1.3)
EGFR: 96 ML/MIN/1.73M2 — SIGNIFICANT CHANGE UP
GAS PNL BLDV: 138 MMOL/L — SIGNIFICANT CHANGE UP (ref 136–145)
GAS PNL BLDV: SIGNIFICANT CHANGE UP
GLUCOSE BLDV-MCNC: 79 MG/DL — SIGNIFICANT CHANGE UP (ref 70–99)
GLUCOSE SERPL-MCNC: 83 MG/DL — SIGNIFICANT CHANGE UP (ref 70–99)
HCO3 BLDV-SCNC: 28 MMOL/L — SIGNIFICANT CHANGE UP (ref 22–29)
HCT VFR BLD CALC: 33.4 % — LOW (ref 39–50)
HCT VFR BLD CALC: 36.4 % — LOW (ref 39–50)
HCT VFR BLDA CALC: 36 % — LOW (ref 39–51)
HGB BLD CALC-MCNC: 12.1 G/DL — LOW (ref 12.6–17.4)
HGB BLD-MCNC: 10.9 G/DL — LOW (ref 13–17)
HGB BLD-MCNC: 11.7 G/DL — LOW (ref 13–17)
LACTATE BLDV-MCNC: 1.3 MMOL/L — SIGNIFICANT CHANGE UP (ref 0.5–2)
MAGNESIUM SERPL-MCNC: 2.3 MG/DL — SIGNIFICANT CHANGE UP (ref 1.6–2.6)
MCHC RBC-ENTMCNC: 28.2 PG — SIGNIFICANT CHANGE UP (ref 27–34)
MCHC RBC-ENTMCNC: 28.4 PG — SIGNIFICANT CHANGE UP (ref 27–34)
MCHC RBC-ENTMCNC: 32.1 GM/DL — SIGNIFICANT CHANGE UP (ref 32–36)
MCHC RBC-ENTMCNC: 32.6 GM/DL — SIGNIFICANT CHANGE UP (ref 32–36)
MCV RBC AUTO: 86.5 FL — SIGNIFICANT CHANGE UP (ref 80–100)
MCV RBC AUTO: 88.3 FL — SIGNIFICANT CHANGE UP (ref 80–100)
NRBC # BLD: 0 /100 WBCS — SIGNIFICANT CHANGE UP (ref 0–0)
NRBC # BLD: 0 /100 WBCS — SIGNIFICANT CHANGE UP (ref 0–0)
PCO2 BLDV: 55 MMHG — SIGNIFICANT CHANGE UP (ref 42–55)
PH BLDV: 7.32 — SIGNIFICANT CHANGE UP (ref 7.32–7.43)
PHOSPHATE SERPL-MCNC: 4.5 MG/DL — SIGNIFICANT CHANGE UP (ref 2.5–4.5)
PLATELET # BLD AUTO: 169 K/UL — SIGNIFICANT CHANGE UP (ref 150–400)
PLATELET # BLD AUTO: 177 K/UL — SIGNIFICANT CHANGE UP (ref 150–400)
PO2 BLDV: 41 MMHG — SIGNIFICANT CHANGE UP (ref 25–45)
POTASSIUM BLDV-SCNC: 3.5 MMOL/L — SIGNIFICANT CHANGE UP (ref 3.5–5.1)
POTASSIUM SERPL-MCNC: 3.5 MMOL/L — SIGNIFICANT CHANGE UP (ref 3.5–5.3)
POTASSIUM SERPL-SCNC: 3.5 MMOL/L — SIGNIFICANT CHANGE UP (ref 3.5–5.3)
PROT SERPL-MCNC: 6.5 G/DL — SIGNIFICANT CHANGE UP (ref 6–8.3)
RBC # BLD: 3.86 M/UL — LOW (ref 4.2–5.8)
RBC # BLD: 4.12 M/UL — LOW (ref 4.2–5.8)
RBC # FLD: 14.9 % — HIGH (ref 10.3–14.5)
RBC # FLD: 15.2 % — HIGH (ref 10.3–14.5)
SAO2 % BLDV: 69.7 % — SIGNIFICANT CHANGE UP (ref 67–88)
SODIUM SERPL-SCNC: 142 MMOL/L — SIGNIFICANT CHANGE UP (ref 135–145)
WBC # BLD: 6.26 K/UL — SIGNIFICANT CHANGE UP (ref 3.8–10.5)
WBC # BLD: 6.41 K/UL — SIGNIFICANT CHANGE UP (ref 3.8–10.5)
WBC # FLD AUTO: 6.26 K/UL — SIGNIFICANT CHANGE UP (ref 3.8–10.5)
WBC # FLD AUTO: 6.41 K/UL — SIGNIFICANT CHANGE UP (ref 3.8–10.5)

## 2024-06-04 PROCEDURE — 93971 EXTREMITY STUDY: CPT | Mod: 26,LT

## 2024-06-04 PROCEDURE — 99223 1ST HOSP IP/OBS HIGH 75: CPT

## 2024-06-04 RX ORDER — PANTOPRAZOLE SODIUM 20 MG/1
40 TABLET, DELAYED RELEASE ORAL
Refills: 0 | Status: DISCONTINUED | OUTPATIENT
Start: 2024-06-04 | End: 2024-06-11

## 2024-06-04 RX ORDER — ACETAMINOPHEN 500 MG
1000 TABLET ORAL ONCE
Refills: 0 | Status: DISCONTINUED | OUTPATIENT
Start: 2024-06-04 | End: 2024-06-11

## 2024-06-04 RX ORDER — ONDANSETRON 8 MG/1
4 TABLET, FILM COATED ORAL EVERY 8 HOURS
Refills: 0 | Status: DISCONTINUED | OUTPATIENT
Start: 2024-06-04 | End: 2024-06-11

## 2024-06-04 RX ORDER — TAMSULOSIN HYDROCHLORIDE 0.4 MG/1
0.4 CAPSULE ORAL AT BEDTIME
Refills: 0 | Status: DISCONTINUED | OUTPATIENT
Start: 2024-06-04 | End: 2024-06-11

## 2024-06-04 RX ORDER — SODIUM CHLORIDE 9 MG/ML
1000 INJECTION INTRAMUSCULAR; INTRAVENOUS; SUBCUTANEOUS
Refills: 0 | Status: DISCONTINUED | OUTPATIENT
Start: 2024-06-04 | End: 2024-06-11

## 2024-06-04 RX ORDER — ATORVASTATIN CALCIUM 80 MG/1
20 TABLET, FILM COATED ORAL AT BEDTIME
Refills: 0 | Status: DISCONTINUED | OUTPATIENT
Start: 2024-06-04 | End: 2024-06-11

## 2024-06-04 RX ORDER — LANOLIN ALCOHOL/MO/W.PET/CERES
3 CREAM (GRAM) TOPICAL AT BEDTIME
Refills: 0 | Status: DISCONTINUED | OUTPATIENT
Start: 2024-06-04 | End: 2024-06-11

## 2024-06-04 RX ORDER — HYDROMORPHONE HYDROCHLORIDE 2 MG/ML
0.5 INJECTION INTRAMUSCULAR; INTRAVENOUS; SUBCUTANEOUS EVERY 4 HOURS
Refills: 0 | Status: DISCONTINUED | OUTPATIENT
Start: 2024-06-04 | End: 2024-06-10

## 2024-06-04 RX ORDER — HYDROMORPHONE HYDROCHLORIDE 2 MG/ML
0.5 INJECTION INTRAMUSCULAR; INTRAVENOUS; SUBCUTANEOUS ONCE
Refills: 0 | Status: DISCONTINUED | OUTPATIENT
Start: 2024-06-04 | End: 2024-06-04

## 2024-06-04 RX ORDER — LACOSAMIDE 50 MG/1
150 TABLET ORAL
Refills: 0 | Status: DISCONTINUED | OUTPATIENT
Start: 2024-06-04 | End: 2024-06-11

## 2024-06-04 RX ORDER — GABAPENTIN 400 MG/1
300 CAPSULE ORAL THREE TIMES A DAY
Refills: 0 | Status: DISCONTINUED | OUTPATIENT
Start: 2024-06-04 | End: 2024-06-11

## 2024-06-04 RX ADMIN — GABAPENTIN 300 MILLIGRAM(S): 400 CAPSULE ORAL at 05:28

## 2024-06-04 RX ADMIN — HYDROMORPHONE HYDROCHLORIDE 0.5 MILLIGRAM(S): 2 INJECTION INTRAMUSCULAR; INTRAVENOUS; SUBCUTANEOUS at 01:00

## 2024-06-04 RX ADMIN — HYDROMORPHONE HYDROCHLORIDE 0.5 MILLIGRAM(S): 2 INJECTION INTRAMUSCULAR; INTRAVENOUS; SUBCUTANEOUS at 01:30

## 2024-06-04 RX ADMIN — ATORVASTATIN CALCIUM 20 MILLIGRAM(S): 80 TABLET, FILM COATED ORAL at 21:27

## 2024-06-04 RX ADMIN — GABAPENTIN 300 MILLIGRAM(S): 400 CAPSULE ORAL at 21:28

## 2024-06-04 RX ADMIN — TAMSULOSIN HYDROCHLORIDE 0.4 MILLIGRAM(S): 0.4 CAPSULE ORAL at 21:27

## 2024-06-04 RX ADMIN — HYDROMORPHONE HYDROCHLORIDE 0.5 MILLIGRAM(S): 2 INJECTION INTRAMUSCULAR; INTRAVENOUS; SUBCUTANEOUS at 22:35

## 2024-06-04 RX ADMIN — LACOSAMIDE 150 MILLIGRAM(S): 50 TABLET ORAL at 18:13

## 2024-06-04 RX ADMIN — LACOSAMIDE 150 MILLIGRAM(S): 50 TABLET ORAL at 00:47

## 2024-06-04 RX ADMIN — HYDROMORPHONE HYDROCHLORIDE 0.5 MILLIGRAM(S): 2 INJECTION INTRAMUSCULAR; INTRAVENOUS; SUBCUTANEOUS at 23:05

## 2024-06-04 RX ADMIN — GABAPENTIN 300 MILLIGRAM(S): 400 CAPSULE ORAL at 14:43

## 2024-06-04 NOTE — PHYSICAL THERAPY INITIAL EVALUATION ADULT - PLANNED THERAPY INTERVENTIONS, PT EVAL
Stair Training - GOAL: Pt will negotiate one flight of stairs +HR in 2 weeks./balance training/gait training

## 2024-06-04 NOTE — H&P ADULT - PROBLEM SELECTOR PLAN 4
-pt with left groin wound vac from previous fasciotomy   -wound vac running out of battery. Please call wound care first thing in the morning

## 2024-06-04 NOTE — H&P ADULT - PROBLEM SELECTOR PLAN 1
-being managed w/ hand surgery.   -f/u hand surgery in the morning   -on my exam, no concern for acute compartment syndrome around 4am.   -HD stable.

## 2024-06-04 NOTE — PATIENT PROFILE ADULT - FUNCTIONAL ASSESSMENT - BASIC MOBILITY 6.
3-calculated by average/Not able to assess (calculate score using [facility] averaging method)

## 2024-06-04 NOTE — H&P ADULT - HISTORY OF PRESENT ILLNESS
53y with hx of CVA w/ residual expressive aphasia, B/L DVTs in the past on lovenox (150mg daily) after hemorrhage from eliquis, seizure disorder, and vertigo. Pt recently had compartment syndrome s/p fasciotomy of the LLE, went to rehab and dc'd home.   Pt comes to [facility] ED today s/p fall while walking w/ walker, left forearm pain.     In the ED, afebrile, was tachy to 103 but came down to 80s since, BP stable, and on RA.   CBC w/ hgb 13.1, wbc 8, plt 227  Coag wnl.   CMP w/ SCr 1.06, LFT wnl. .   XR left elbow, left forearm, CXR and pelvis neg for acute fx.   CTH noncon neg.   CTA LUE w/ soft tissue hematoma with small foci of contrast   extravasation.    Pt seen by vascualr and hand surgery in the ED for c/f compartment syndrome on LUE.   Hand surgery Dr. Gudino recommends elevating the elbow and monitor overnight (to be done by orthopedics residents overnight).  53y with hx of CVA w/ residual expressive aphasia, h/o b/l DVTs, seizure disorder, and vertigo. Pt recently had compartment syndrome s/p fasciotomy of the LLE, went to rehab and dc'd home.   Pt comes to [facility] ED today s/p fall while walking w/ walker, left forearm pain.     In the ED, afebrile, was tachy to 103 but came down to 80s since, BP stable, and on RA.   CBC w/ hgb 13.1, wbc 8, plt 227  Coag wnl.   CMP w/ SCr 1.06, LFT wnl. .   XR left elbow, left forearm, CXR and pelvis neg for acute fx.   CTH noncon neg.   CTA LUE w/ soft tissue hematoma with small foci of contrast   extravasation.    Pt seen by vascualr and hand surgery in the ED for c/f compartment syndrome on LUE.   Hand surgery Dr. Gudino recommends elevating the elbow and monitor overnight (to be done by orthopedics residents overnight).

## 2024-06-04 NOTE — H&P ADULT - NSHPLABSRESULTS_GEN_ALL_CORE
13.1   8.36  )-----------( 227      ( 03 Jun 2024 18:34 )             40.3       06-03    143  |  104  |  14  ----------------------------<  100<H>  4.0   |  24  |  1.06    Ca    10.0      03 Jun 2024 18:34    TPro  7.5  /  Alb  4.3  /  TBili  0.4  /  DBili  x   /  AST  25  /  ALT  29  /  AlkPhos  84  06-03              Urinalysis Basic - ( 03 Jun 2024 18:34 )    Color: x / Appearance: x / SG: x / pH: x  Gluc: 100 mg/dL / Ketone: x  / Bili: x / Urobili: x   Blood: x / Protein: x / Nitrite: x   Leuk Esterase: x / RBC: x / WBC x   Sq Epi: x / Non Sq Epi: x / Bacteria: x        PT/INR - ( 03 Jun 2024 20:22 )   PT: 11.2 sec;   INR: 1.02 ratio         PTT - ( 03 Jun 2024 20:22 )  PTT:30.5 sec    CARDIAC MARKERS ( 03 Jun 2024 20:22 )  x     / x     / 222 U/L / x     / x            CAPILLARY BLOOD GLUCOSE

## 2024-06-04 NOTE — CONSULT NOTE ADULT - SUBJECTIVE AND OBJECTIVE BOX
Neurology Consult    Reason for Consult: Patient is a 53y old  Male who presents with a chief complaint of same (03 Jun 2024 23:39)      HPI:  53y with hx of CVA w/ residual expressive aphasia, h/o b/l DVTs, seizure disorder, and vertigo. Pt recently had compartment syndrome s/p fasciotomy of the LLE, went to rehab and dc'd home.   Pt comes to [facility] ED today s/p fall while walking w/ walker, left forearm pain.     In the ED, afebrile, was tachy to 103 but came down to 80s since, BP stable, and on RA.   CBC w/ hgb 13.1, wbc 8, plt 227  Coag wnl.   CMP w/ SCr 1.06, LFT wnl. .   XR left elbow, left forearm, CXR and pelvis neg for acute fx.   CTH noncon neg.   CTA LUE w/ soft tissue hematoma with small foci of contrast   extravasation.    Pt seen by vascualr and hand surgery in the ED for c/f compartment syndrome on LUE.   Hand surgery Dr. Gudino recommends elevating the elbow and monitor overnight (to be done by orthopedics residents overnight).  (04 Jun 2024 01:02)       PAST MEDICAL & SURGICAL HISTORY:  History of TIAs      CVA (cerebrovascular accident)      HLD (hyperlipidemia)      Vertigo      Colon polyp      Seizure disorder      CVA (cerebrovascular accident)      History of fasciotomy      H/O arthroscopy of knee      S/P excision of neuroma      History of loop recorder      H/O fasciotomy          Allergies: Allergies    No Known Allergies    Intolerances        Social History: Denies toxic habits including tobacco, ETOH or illicit drugs.    Family History: FAMILY HISTORY:  Known health problems: none    . No family history of strokes    Medications: MEDICATIONS  (STANDING):  acetaminophen   IVPB .. 1000 milliGRAM(s) IV Intermittent once  atorvastatin 20 milliGRAM(s) Oral at bedtime  gabapentin 300 milliGRAM(s) Oral three times a day  lacosamide 150 milliGRAM(s) Oral two times a day  lidocaine 1% Injectable 5 milliLiter(s) Local Injection Once  tamsulosin 0.4 milliGRAM(s) Oral at bedtime    MEDICATIONS  (PRN):  aluminum hydroxide/magnesium hydroxide/simethicone Suspension 30 milliLiter(s) Oral every 4 hours PRN Dyspepsia  melatonin 3 milliGRAM(s) Oral at bedtime PRN Insomnia  ondansetron Injectable 4 milliGRAM(s) IV Push every 8 hours PRN Nausea and/or Vomiting      Review of Systems:  CONSTITUTIONAL:  No weight loss, fever, chills, weakness or fatigue.  HEENT:  Eyes:  No visual loss, blurred vision, double vision or yellow sclera. Ears, Nose, Throat:  No hearing loss, sneezing, congestion, runny nose or sore throat.  SKIN:  No rash or itching.  CARDIOVASCULAR:  No chest pain, chest pressure or chest discomfort. No palpitations or edema.  RESPIRATORY:  No shortness of breath, cough or sputum.  GASTROINTESTINAL:  No anorexia, nausea, vomiting or diarrhea. No abdominal pain or blood.  GENITOURINARY:  No burning on urination or incontinence   NEUROLOGICAL:  No headache, dizziness, syncope, paralysis, ataxia, numbness or tingling in the extremities. No change in bowel or bladder control. no limb weakness. no vision changes.   MUSCULOSKELETAL:  No muscle, back pain, joint pain or stiffness.  HEMATOLOGIC:  No anemia, bleeding or bruising.  LYMPHATICS:  No enlarged nodes. No history of splenectomy.  PSYCHIATRIC:  No history of depression or anxiety.  ENDOCRINOLOGIC:  No reports of sweating, cold or heat intolerance. No polyuria or polydipsia.      Vitals:  Vital Signs Last 24 Hrs  T(C): 36.4 (04 Jun 2024 03:50), Max: 36.6 (03 Jun 2024 16:40)  T(F): 97.5 (04 Jun 2024 03:50), Max: 97.9 (03 Jun 2024 16:40)  HR: 65 (04 Jun 2024 03:50) (64 - 103)  BP: 100/69 (04 Jun 2024 03:50) (100/69 - 117/88)  BP(mean): 94 (03 Jun 2024 18:31) (94 - 103)  RR: 18 (04 Jun 2024 03:50) (18 - 18)  SpO2: 98% (04 Jun 2024 03:50) (96% - 100%)    Parameters below as of 04 Jun 2024 03:50  Patient On (Oxygen Delivery Method): room air        General Exam:   General Appearance: Appropriately dressed and in no acute distress       Head: Normocephalic, atraumatic and no dysmorphic features  Ear, Nose, and Throat: Moist mucous membranes  CVS: S1S2+  Resp: No SOB, no wheeze or rhonchi  GI: soft NT/ND  Extremities:  LLE wound from prior hematoma. LUE hematoma   Skin: bruieses noted      Neurological Exam:  Mental Status: Awake, alert and oriented x 3, minimal WFD/[place].  Able to follow simple and complex verbal commands. Able to name and repeat. fluent speech. No obvious aphasia or dysarthria noted.   Cranial Nerves: PERRL, EOMI, VFFC, sensation V1-V3 intact,  no obvious facial asymmetry, equal elevation of palate, scm/trap 5/5, tongue is midline on protrusion. no obvious papilledema on fundoscopic exam. hearing is grossly intact.   Motor: Normal bulk, tone and strength throughout. Fine finger movements were intact and symmetric. no tremors or drift noted.    Sensation: Intact to light touch and pinprick throughout. no right/left confusion. no extinction to tactile on DSS.   Reflexes: 1+ throughout at biceps, brachioradialis, triceps, patellars and ankles bilaterally and equal. No clonus. R toe and L toe were both downgoing.  Coordination: No dysmetria on FNF    Gait:  walker     Data/Labs/Imaging which I personally reviewed.     Labs:     CBC Full  -  ( 04 Jun 2024 06:51 )  WBC Count : 6.26 K/uL  RBC Count : 4.12 M/uL  Hemoglobin : 11.7 g/dL  Hematocrit : 36.4 %  Platelet Count - Automated : 177 K/uL  Mean Cell Volume : 88.3 fl  Mean Cell Hemoglobin : 28.4 pg  Mean Cell Hemoglobin Concentration : 32.1 gm/dL  Auto Neutrophil # : x  Auto Lymphocyte # : x  Auto Monocyte # : x  Auto Eosinophil # : x  Auto Basophil # : x  Auto Neutrophil % : x  Auto Lymphocyte % : x  Auto Monocyte % : x  Auto Eosinophil % : x  Auto Basophil % : x    06-04    142  |  103  |  14  ----------------------------<  83  3.5   |  24  |  0.95    Ca    8.8      04 Jun 2024 06:51  Phos  4.5     06-04  Mg     2.3     06-04    TPro  6.5  /  Alb  3.7  /  TBili  0.5  /  DBili  x   /  AST  18  /  ALT  22  /  AlkPhos  78  06-04    LIVER FUNCTIONS - ( 04 Jun 2024 06:51 )  Alb: 3.7 g/dL / Pro: 6.5 g/dL / ALK PHOS: 78 U/L / ALT: 22 U/L / AST: 18 U/L / GGT: x           PT/INR - ( 03 Jun 2024 20:22 )   PT: 11.2 sec;   INR: 1.02 ratio         PTT - ( 03 Jun 2024 20:22 )  PTT:30.5 sec  Urinalysis Basic - ( 04 Jun 2024 06:51 )    Color: x / Appearance: x / SG: x / pH: x  Gluc: 83 mg/dL / Ketone: x  / Bili: x / Urobili: x   Blood: x / Protein: x / Nitrite: x   Leuk Esterase: x / RBC: x / WBC x   Sq Epi: x / Non Sq Epi: x / Bacteria: x      < from: CT Head No Cont (06.03.24 @ 19:57) >    ACC: [id number] EXAM:  CT BRAIN   ORDERED BY: WOODY MARIE     PROCEDURE DATE:  06/03/2024          INTERPRETATION:  CLINICAL INFORMATION: Fall.    TECHNIQUE: Noncontrast axial CT images were acquired through the head.   Two-dimensional sagittal and coronal reformats were generated.    COMPARISON STUDY: CT head 5/2/2024. Prior contrast enhanced brain MRI   study from 4/26/2023.    FINDINGS:    There is no CT evidence of acute intracranial hemorrhage, extra-axial   collection, vasogenic edema, mass effect, midline shift, central   herniation, or hydrocephalus.    Stable calcifications in the central alexis, bilateral temporal lobes, and   right frontal lobe.    There is age appropriate cerebral volume loss with patchy white matter   hypoattenuation likely related to microvascular disease.    Mucosal thickening/debris in the bilateral maxillary and ethmoid sinuses.   The mastoid air cells and middle ear cavities are clear.    The soft tissues of the scalp are unremarkable. The calvarium isintact.    IMPRESSION:    No CT evidence of acute intracranial hemorrhage, edema, or mass effect.    --- End of Report ---           AKIL OLIVIA MD; Resident Radiologist  This document has been electronically signed.  SERENA MILTON MD; Attending Radiologist  This document has been electronically signed. Joshua  3 2024  8:32PM    < end of copied text >      
Reason for consult: hx TIA, DVT    HPI:  53y with hx of CVA w/ residual expressive aphasia, h/o b/l DVTs, seizure disorder, and vertigo. Pt recently had compartment syndrome s/p fasciotomy of the LLE, went to rehab and dc'd home.   Pt comes to [facility] ED today s/p fall while walking w/ walker, left forearm pain.     In the ED, afebrile, was tachy to 103 but came down to 80s since, BP stable, and on RA.   CBC w/ hgb 13.1, wbc 8, plt 227  Coag wnl.   CMP w/ SCr 1.06, LFT wnl. .   XR left elbow, left forearm, CXR and pelvis neg for acute fx.   CTH noncon neg.   CTA LUE w/ soft tissue hematoma with small foci of contrast   extravasation.    Pt seen by vascualr and hand surgery in the ED for c/f compartment syndrome on LUE.   Hand surgery Dr. Gudino recommends elevating the elbow and monitor overnight (to be done by orthopedics residents overnight).  (04 Jun 2024 01:02)    Hematology/Oncology called to see patient who follows with Dr Varela of [facility] d/t pts hx of DVTs    PAST MEDICAL & SURGICAL HISTORY:  History of TIAs      CVA (cerebrovascular accident)      HLD (hyperlipidemia)      Vertigo      Colon polyp      Seizure disorder      CVA (cerebrovascular accident)      History of fasciotomy      H/O arthroscopy of knee      S/P excision of neuroma      History of loop recorder      H/O fasciotomy          FAMILY HISTORY:  Known health problems: none        Alochol: Denied  Smoking: Nonsmoker  Drug Use: Denied  Marital Status:         Allergies    No Known Allergies    Intolerances        MEDICATIONS  (STANDING):  acetaminophen   IVPB .. 1000 milliGRAM(s) IV Intermittent once  atorvastatin 20 milliGRAM(s) Oral at bedtime  gabapentin 300 milliGRAM(s) Oral three times a day  lacosamide 150 milliGRAM(s) Oral two times a day  lidocaine 1% Injectable 5 milliLiter(s) Local Injection Once  pantoprazole    Tablet 40 milliGRAM(s) Oral before breakfast  sodium chloride 0.9%. 1000 milliLiter(s) (50 mL/Hr) IV Continuous <Continuous>  tamsulosin 0.4 milliGRAM(s) Oral at bedtime    MEDICATIONS  (PRN):  aluminum hydroxide/magnesium hydroxide/simethicone Suspension 30 milliLiter(s) Oral every 4 hours PRN Dyspepsia  melatonin 3 milliGRAM(s) Oral at bedtime PRN Insomnia  ondansetron Injectable 4 milliGRAM(s) IV Push every 8 hours PRN Nausea and/or Vomiting      ROS  No fever, sweats, chills  No epistaxis, HA, sore throat  No CP, SOB, cough, sputum  No n/v/d, abd pain, melena, hematochezia  No edema  No rash  No anxiety  No back pain, joint pain  No bleeding, bruising  No dysuria, hematuria    T(C): 36.4 (06-04-24 @ 09:55), Max: 36.6 (06-03-24 @ 16:40)  HR: 73 (06-04-24 @ 09:55) (64 - 103)  BP: 105/69 (06-04-24 @ 09:55) (100/69 - 117/88)  RR: 18 (06-04-24 @ 09:55) (18 - 18)  SpO2: 98% (06-04-24 @ 09:55) (96% - 100%)  Wt(kg): --    PE  Awake, alert  Anicteric, MMM  RRR  CTAB  Abd soft, NT, ND  No c/c                            11.7   6.26  )-----------( 177      ( 04 Jun 2024 06:51 )             36.4       06-04    142  |  103  |  14  ----------------------------<  83  3.5   |  24  |  0.95    Ca    8.8      04 Jun 2024 06:51  Phos  4.5     06-04  Mg     2.3     06-04    TPro  6.5  /  Alb  3.7  /  TBili  0.5  /  DBili  x   /  AST  18  /  ALT  22  /  AlkPhos  78  06-04  
VASCULAR SURGERY CONSULT NOTE  --------------------------------------------------------------------------------------------     Estiven Simeon is a 54 y.owith history of CVA c/b expressive aphasia, B/L DVTs (therapeutic lovenox), LLE compartment syndrome s/p fasciotomy, and left groin hematoma s/p debridement who presented to the ED after fall onto left upper extremity from standing.     Denies numbness. Left arm feels tight. Motor intact.         ROS: 10-system review is otherwise negative except HPI above.      PAST MEDICAL & SURGICAL HISTORY:  History of TIAs      CVA (cerebrovascular accident)      HLD (hyperlipidemia)      Vertigo      Colon polyp      Seizure disorder      CVA (cerebrovascular accident)      History of fasciotomy      H/O arthroscopy of knee      S/P excision of neuroma      History of loop recorder      H/O fasciotomy        FAMILY HISTORY:  Known health problems: none        SOCIAL HISTORY:      ALLERGIES: No Known Allergies      HOME MEDICATIONS:     CURRENT MEDICATIONS  MEDICATIONS (STANDING): lidocaine 1% Injectable 5 milliLiter(s) Local Injection Once    MEDICATIONS (PRN):  --------------------------------------------------------------------------------------------    Vitals:   T(C): 36.4 (06-03-24 @ 21:31), Max: 36.6 (06-03-24 @ 16:40)  HR: 81 (06-03-24 @ 21:31) (81 - 103)  BP: 110/81 (06-03-24 @ 21:31) (110/77 - 114/85)  RR: 18 (06-03-24 @ 21:31) (18 - 18)  SpO2: 100% (06-03-24 @ 21:31) (96% - 100%)  CAPILLARY BLOOD GLUCOSE        CAPILLARY BLOOD GLUCOSE          Height (cm): 190.5 (06-03 @ 16:40)  Weight (kg): 93.4 (06-03 @ 16:40)  BMI (kg/m2): 25.7 (06-03 @ 16:40)  BSA (m2): 2.22 (06-03 @ 16:40)    PHYSICAL EXAM:   General: NAD, Lying in bed comfortably  Neuro: A+Ox3  HEENT: NC/AT, EOMI  Neck: Soft, supple  Resp: Good effort, CTA b/l  Vascular: Palpable left brachial, palpable left radial. Hand warm. Sensory intact. Motor intact. Arm swollen. No active bleeding. bruised.   Skin: Intact, no breakdown  Lymphatic/Nodes: No palpable lymphadenopathy  Musculoskeletal: All 4 extremities moving spontaneously, no limitations .LLE fasciotomy sites c.d.i.   --------------------------------------------------------------------------------------------    LABS  CBC (06-03 @ 18:34)                              13.1                           8.36    )----------------(  227        78.7<H>% Neutrophils, 10.4<L>% Lymphocytes, ANC: 6.58                                40.3      BMP (06-03 @ 18:34)             143     |  104     |  14    		Ca++ --      Ca 10.0               ---------------------------------( 100<H>		Mg --                 4.0     |  24      |  1.06  			Ph --        LFTs (06-03 @ 18:34)      TPro 7.5 / Alb 4.3 / TBili 0.4 / DBili -- / AST 25 / ALT 29 / AlkPhos 84    Coags (06-03 @ 20:22)  aPTT 30.5 / INR 1.02 / PT 11.2    Cardiac Markers (06-03 @ 20:22)     HSTrop: -- / CKMB: -- / CK: 222        --------------------------------------------------------------------------------------------    MICROBIOLOGY  Urinalysis (06-03 @ 18:34):     Color:  / Appearance:  / SG:  / pH:  / Gluc: 100<H> / Ketones:  / Bili:  / Urobili:  / Protein : / Nitrites:  / Leuk.Est:  / RBC:  / WBC:  / Sq Epi:  / Non Sq Epi:  / Bacteria          --------------------------------------------------------------------------------------------    IMAGING      
Pt evaluated and examined. 54YO left hand dominant s/p CVA, DVTs on Lovenox, s/p recent hx of lower extremity fasciotomies for compartment syndrome, uses a walker and fell today.  Developed progressive hematoma on left forearm.      On PE there is a tense hematoma and ecchymosis on dorsal forearm and skin blisters.  Volar compartments are soft and there is NO tenderness at passive flexion and extension of hand and wrist.

## 2024-06-04 NOTE — CONSULT NOTE ADULT - ASSESSMENT
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) with residual mild word finding difficulty, bilateral DVTs, compartment syndrome status post left fasciotomy and another LLE recently with wound vac now, HLD, PFO, vertigo, seizure disorder presenting  fall while walking with walker developed LUE hematoma   CTH 6/4 no acute fidnigns     Impression:   1) chronic strokes, resid minimal WFD  2) seizure d/o 2/2 strokes  3) ADHD  4) vertigo BPPV, stable   5) chornic L thigh hematoma   6) LLE compartment syndrome   7) new Left forearm hematoma      - f/u vasc and vac team ; wound vac   -  on ASA 81mg daily and lovenox 40mg    ; resume when able   - monitor H/H.      - monitor for compartment syndrome   - Lovenox full dose now held.  was injecting in thigh at site of hematoma ; previously was on full dose then charged to 40mg SQ   - for ADHD gets Vyvanse 50mg daily  - for seizure he is on vimpat 150 mg BID  - statin therapy with LDL goal < 70mg/dL; atorvastatin 20 at home   - meclizline PRN   - PT/OT   - check FS, glucose control <180  - GI/DVT ppx   - Thank you for allowing me to participate in the care of this patient. Call with questions.      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) with residual mild word finding difficulty, bilateral DVTs, compartment syndrome status post left fasciotomy and another LLE recently with wound vac now, HLD, PFO, vertigo, seizure disorder presenting  fall while walking with walker developed LUE hematoma   CTH 6/4 no acute fidnigns     Impression:   1) chronic strokes, resid minimal WFD  2) seizure d/o 2/2 strokes  3) ADHD  4) vertigo BPPV, stable   5) chornic L thigh hematoma   6) LLE compartment syndrome   7) new Left forearm hematoma   8) fall      - f/u vasc and vac team ; wound vac   -  on ASA 81mg daily and lovenox 40mg    ; resume when able   - monitor H/H.      - monitor for compartment syndrome   - Lovenox full dose now held.  was injecting in thigh at site of hematoma ; previously was on full dose then charged to 40mg SQ   - for ADHD gets Vyvanse 50mg daily  - for seizure he is on vimpat 150 mg BID  - statin therapy with LDL goal < 70mg/dL; atorvastatin 20 at home   - meclizline PRN   - PT/OT   - check FS, glucose control <180  - GI/DVT ppx   - Thank you for allowing me to participate in the care of this patient. Call with questions.      Braxton Forde MD  Vascular Neurology  Office: [phone]

## 2024-06-04 NOTE — PROGRESS NOTE ADULT - ASSESSMENT
Estiven Simeon is a 54 y.owith history of CVA c/b expressive aphasia, B/L DVTs (therapeutic lovenox), LLE compartment syndrome s/p fasciotomy, and left groin hematoma s/p debridement who presented to the ED after fall onto left upper extremity from standing.     RECOMMENDATIONS  - Low concern for compartment syndrome at this time  - Continue LUE elevation  - Will monitor q8h for compartment syndrome  - Please reach out with any changes to exam    Plastic Surgery   [facility]: [id number]  [facility]: [phone]

## 2024-06-04 NOTE — PATIENT PROFILE ADULT - NSPROPTRIGHTCAREGIVER_GEN_A_NUR
[FreeTextEntry3] : Large joint injection was performed on the right and left knee. The indication for this procedure was pain, inflammation, and x-ray evidence of Osteoarthritis on this or prior visit. The site was prepped with betadine, ethyl chloride sprayed topically, and sterile technique used. An injection of Lidocaine 3cc of 1% , Bupivacaine (Marcaine) 5cc of 0.25% , Methylprednisolone (Depomedrol) cc of 80 mg was used. Patient was advised to call if redness, pain, or fever occur, apply ice for 15 minutes out of every hour for the next 12-24 hours as tolerated and patient was advised to rest the joint(s) for days.\par Patient has tried OTC's including aspirin, Ibuprofen, Aleve, etc or prescription NSAIDS, and/or exercises at home and/or physical therapy without satisfactory response and patient had decreased mobility in the joint. Ultrasound guidance was indicated for this patient due to better visualize joint space. All ultrasound images have been permanently captured and stored accordingly in our picture.  \par \par Aspiration of the knee was performed using an 18-gauge needle under sterile conditions\par ------------cc of ---------------fluid was aspirated\par (this was sent for cell count, culture, gram stain, and crystals)
no

## 2024-06-04 NOTE — PHYSICAL THERAPY INITIAL EVALUATION ADULT - RANGE OF MOTION EXAMINATION, REHAB EVAL
L elbow limited by pain/swelling, L ankle limited by muscle tightness into dorsiflexion, all other ROM WFL/bilateral upper extremity ROM was WFL (within functional limits)/bilateral lower extremity ROM was WFL (within functional limits)

## 2024-06-04 NOTE — PHYSICAL THERAPY INITIAL EVALUATION ADULT - ADDITIONAL COMMENTS
Pt lives alone in a PH +no steps to enter +one flight to bedroom. Pt has a walk in shower. Pt ambulates with a RW typically. Pt is independent with ADLs. Pt reports that he has used a shower chair and raised toilet seat in the past but does not feel the need for them.

## 2024-06-04 NOTE — PHYSICAL THERAPY INITIAL EVALUATION ADULT - PERTINENT HX OF CURRENT PROBLEM, REHAB EVAL
Pt is a 54 y/o male admitted s/p fall. PMH: CVA w/ residual expressive aphasia, h/o b/l DVTs, seizure disorder, and vertigo. Pt recently had compartment syndrome s/p fasciotomy of the LLE, went to rehab and dc'd home. Pt comes to [facility] ED today s/p fall while walking w/ walker, left forearm pain. In the ED, afebrile, was tachy to 103 but came down to 80s since, BP stable, and on RA. CBC w/ hgb 13.1, wbc 8, plt 227. Coag wnl. CMP w/ SCr 1.06, LFT wnl. . XR left elbow, left forearm, CXR and pelvis neg for acute fx. CTH noncon neg. CTA LUE w/ soft tissue hematoma with small foci of contrast extravasation.   Pt seen by vascualr and hand surgery in the ED for c/f compartment syndrome on LUE.   Hand surgery Dr. Gudino recommends elevating the elbow and monitor overnight (to be done by orthopedics residents overnight).     IMAGING: Xray pelvis (-). Left elbow xray (-). CXR (-). CTH demonstrates No CT evidence of acute intracranial hemorrhage, edema, or mass effect. CTA UE demonstrates Left forearm soft tissue hematoma with small foci of contrast extravasation.

## 2024-06-04 NOTE — H&P ADULT - NSHPPHYSICALEXAM_GEN_ALL_CORE
Vital Signs Last 24 Hrs  T(C): 36.4 (04 Jun 2024 03:50), Max: 36.6 (03 Jun 2024 16:40)  T(F): 97.5 (04 Jun 2024 03:50), Max: 97.9 (03 Jun 2024 16:40)  HR: 65 (04 Jun 2024 03:50) (64 - 103)  BP: 100/69 (04 Jun 2024 03:50) (100/69 - 117/88)  BP(mean): 94 (03 Jun 2024 18:31) (94 - 103)  RR: 18 (04 Jun 2024 03:50) (18 - 18)  SpO2: 98% (04 Jun 2024 03:50) (96% - 100%)    Parameters below as of 04 Jun 2024 03:50  Patient On (Oxygen Delivery Method): room air        CONSTITUTIONAL: Well-groomed, in no apparent distress  EYES: No conjunctival or scleral injection, non-icteric  ENMT: No external nasal lesions; MMM  RESPIRATORY: Breathing comfortably; lungs CTA without wheeze/rhonchi/rales  CARDIOVASCULAR: +S1S2, RRR; no lower extremity edema  GASTROINTESTINAL: No tenderness, +BS throughout, no rebound/guarding  NEUROLOGIC: No gross focal neurological deficits, AAOX3  PSYCHIATRIC: mood and affect appropriate; appropriate insight and judgment  MSK: left groin area w/ intact wound vac dressing. Left forearm with open wounds w/ dried blood. hematoma and ecchymosis on the dorsal forearm. left forehead with some abrasions and small ecchymosis.

## 2024-06-04 NOTE — CHART NOTE - NSCHARTNOTEFT_GEN_A_CORE
Search Terms: andrews lafleur, 1970Search Date: 06/04/2024 01:26:07 AM  The Drug Utilization Report below displays all of the controlled substance prescriptions, if any, that your patient has filled in the last twelve months. The information displayed on this report is compiled from pharmacy submissions to the Department, and accurately reflects the information as submitted by the pharmacies.    This report was requested by: Arnel Walden | Reference #: [id number]    Practitioner Count: 2  Pharmacy Count: 1  Current Opioid Prescriptions: 0  Current Benzodiazepine Prescriptions: 0  Current Stimulant Prescriptions: 1      Patient Demographic Information (PDI)       PDI	First Name	Last Name	Birth Date	Gender	Street Address	City	State	Zip Code  A	Andrews Lafleur	1970	Male	[address] [facility]	[address]  B	Andrews Lafleur	1970	Male	[address]	[facility]	[address]    Prescription Information      PDI Filter:    PDI	My Rx	Current Rx	Drug Type	Rx Written	Rx Dispensed	Drug	Quantity	Days Supply	Prescriber Name	Prescriber KAMERON #	Payment Method	Dispenser  A	N	Y	S	05/23/2024	05/23/2024	vyvanse 50 mg capsule	30	30	Braxton Forde D	[id number]	Medicaid	[id number]  A	N	N	O	05/09/2024	05/09/2024	oxycodone hcl (ir) 5 mg tablet	28	7	AraimBayron	[id number]	Medicaid	[id number]  A	N	Y		05/09/2024	05/09/2024	lacosamide 150 mg tablet	60	30	Araim, Freddya	[id number]	Medicaid	[id number]  A	N	N	S	03/28/2024	03/31/2024	vyvanse 50 mg capsule	30	30	Braxton Forde D	[id number]	Medicaid	[id number]  A	N	N		03/05/2024	03/09/2024	lacosamide 150 mg tablet	60	30	Braxton Forde D	[id number]	Medicaid	[id number]  A	N	N	S	02/26/2024	03/04/2024	vyvanse 50 mg capsule	30	30	Braxton Fored D	[id number]	Medicaid	[id number]  A	N	N		02/01/2024	02/11/2024	lacosamide 150 mg tablet	60	30	Giuseppe Fordemy, D	[id number]	Medicaid	[id number]  A	N	N	S	01/24/2024	01/24/2024	vyvanse 50 mg capsule	30	30	PishanidarBraxton D	[id number]	Medicaid	[id number]  A	N	N		12/27/2023	12/30/2023	lacosamide 150 mg tablet	60	30	PishanidarBraxton D	[id number]	Medicaid	[id number]  A	N	N	S	12/08/2023	12/13/2023	vyvanse 50 mg capsule	30	30	PishanidarBraxton D	[id number]	Medicaid	[id number]  A	N	N		11/30/2023	12/02/2023	lacosamide 150 mg tablet	60	30	PishanidarBraxton D	[id number]	Medicaid	[id number]  A	N	N	S	11/01/2023	11/14/2023	vyvanse 50 mg capsule	30	30	PisdheerajidaBraxton rubalcava D	[id number]	Medicaid	[id number]  A	N	N		11/01/2023	11/03/2023	lacosamide 150 mg tablet	60	30	PisdheerajidaBraxton rubalcava D	[id number]	Medicaid	[id number]  A	N	N		10/19/2023	10/20/2023	lacosamide 100 mg tablet	60	30	PisdheerajidaBraxton rubalcava D	[id number]	Medicaid	[id number]  A	N	N	B	10/13/2023	10/13/2023	clonazepam 0.5 mg tablet	14	7	[facility]	[id number]	Medicaid	[id number]  A	N	N	S	10/06/2023	10/09/2023	vyvanse 50 mg capsule	30	30	PisdheerajidarBraxton D	[id number]	Medicaid	[id number]  A	N	N		09/18/2023	09/22/2023	lacosamide 100 mg tablet	60	30	PisdheerajidaBraxton rubalcava D	[id number]	Medicaid	[id number]  A	N	N	S	09/19/2023	09/19/2023	vyvanse 40 mg capsule	30	30	PisdheerajidaBraxton rubalcava D	[id number]	Medicaid	[id number]  A	N	N		08/17/2023	08/23/2023	lacosamide 100 mg tablet	60	30	Pishanidar, GLADIS Limon	[id number]	Medicaid	[id number]  A	N	N	S	08/10/2023	08/18/2023	vyvanse 40 mg capsule	30	30	Pishanidar, GLADIS Limon	[id number]	Medicaid	[id number]  B	N	N		07/20/2023	07/22/2023	lacosamide 100 mg tablet	60	30	PishanidarBraxton D	[id number]	Medicaid	[facility]  B	N	N	S	07/10/2023	07/14/2023	vyvanse 30 mg capsule	30	30	Pishanidar, GLADIS Limon	[id number]	Medicaid	[facility]  B	N	N		06/22/2023	06/22/2023	lacosamide 100 mg tablet	60	30	PishanidarBraxton D	[id number]	Medicaid	[facility]  B	N	N	S	06/19/2023	06/19/2023	vyvanse 30 mg capsule	30	30	PishanidarBraxton D	[id number]	Medicaid	[facility]

## 2024-06-04 NOTE — PHYSICAL THERAPY INITIAL EVALUATION ADULT - MODALITIES TREATMENT COMMENTS
Pt presents with L groin wound s/p fasciotomy (4/2024). Wound appears clean, red and grannulating 100%. Measures 8.0cm x 3.0cm x 2.0cm. Cleansed with normal saline. Redressed with adaptic and black sponge, placed to continuous suction at 125mmHg with good seal.

## 2024-06-04 NOTE — PROGRESS NOTE ADULT - ASSESSMENT
53M admitted for hematoma on the left forearm.       Traumatic hematoma of left upper arm.   - S/P Mechanical fall (Walker got caught on sidewalk)  - CT Angio w/ 9.1 x 4.1 cm hematoma in soft tissues of   the posterior forearm with scattered punctate hyperdense foci, concern for contrast extravasation, Left forearm soft tissue hematoma with small foci of contrast extravasation.  - CT H without acute findings   - X-rays neg for acute fractures or dislocations  - Lovenox on hold. Check CBC BID  - Elevated CK, trend in AM - gentle IVF for hydration    - Monitor for compartment syndrome   - Neurovascular checks   - Per Hand surgery, c/w elevation of forearm on pillows and blankets keeping elbow at 90 degrees  - Vascular surgery and Hand Surgery evals appreciated; F/u recs     H/O DVT, Anemia   - Pt was on Lovenox full dose for AC. Later changed to Lovenox 40 Qd  - Check serial CBC BID; Monitor H/H   - Heme/Onc eval consulted: F/u recs    Seizure disorder.   - C/w Lacosamide  - Seizure precautions  - Neuro eval appreciated; F/u recs    History of CVA (cerebrovascular accident).   - C/w home ASA and Liptior    Encounter for postoperative wound care.   - Pt with left groin wound vac from previous fasciotomy   - Wound vac ran out of battery 6/4 AM. Vascular surgery team notified --> Wound vac was removed by vascular and site was dressed  - PT Wound care eval placed     Chronic pain.   - On gabapentin.  - Reports he had not taken oxycodone or tramadol in a while.    BPH  - C/w Flomax    Medication management.  - Have asked pharmacy to obtain med rec    Prophylactic measure.   - VTE ppx: holding chemical ppx d/t active bleeding.   - PPI       Discussed with Attending, Neuro, and ACP 53M admitted for hematoma on the left forearm.       Traumatic hematoma of left upper arm.   - S/P Mechanical fall (Walker got caught on sidewalk)  - CT Angio w/ 9.1 x 4.1 cm hematoma in soft tissues of   the posterior forearm with scattered punctate hyperdense foci, concern for contrast extravasation, Left forearm soft tissue hematoma with small foci of contrast extravasation.  - CT H without acute findings   - X-rays neg for acute fractures or dislocations  - Lovenox on hold. Check CBC BID  - Elevated CK, trend in AM - gentle IVF for hydration    - Monitor for compartment syndrome   - Neurovascular checks   - Per Hand surgery, c/w elevation of forearm on pillows and blankets keeping elbow at 90 degrees  - Vascular surgery and Hand Surgery evals appreciated; F/u recs     H/O DVT, Anemia   - Pt was on Lovenox full dose for AC. Later changed to Lovenox 40 Qd  - Check LE Duplex   - Check serial CBC BID; Monitor H/H   - Heme/Onc eval consulted: F/u recs    Seizure disorder.   - C/w Lacosamide  - Seizure precautions  - Neuro eval appreciated; F/u recs    History of CVA (cerebrovascular accident).   - C/w home ASA and Liptior    Encounter for postoperative wound care.   - Pt with left groin wound vac from previous fasciotomy   - Wound vac ran out of battery 6/4 AM. Vascular surgery team notified --> Wound vac was removed by vascular and site was dressed  - PT Wound care eval placed     Chronic pain.   - On gabapentin.  - Reports he had not taken oxycodone or tramadol in a while.    BPH  - C/w Flomax    Medication management.  - Have asked pharmacy to obtain med rec    Prophylactic measure.   - VTE ppx: holding chemical ppx d/t active bleeding.   - PPI       Discussed with Attending, Neuro, and ACP

## 2024-06-04 NOTE — CONSULT NOTE ADULT - ASSESSMENT
53y Male with pmhx CVA and Hx of DVT's presenting s/p fall    #Anemia  #DVTs  --follows with Dr Varela of [facility]  --Hgb 13-14 at baseline, outpatient blood work done 5/24 w/ Hgb of 12.4   --no nutritional deficiencies noted on outpatient blood work from 5/24  --pt has a small PFO   ­-failure of therapy with Eliquis, hypercoag work up negative in past  --transfuse for Hb>7  --pt was on AC prophylaxis w/ Lovenox 40 and ASA 81--> currently on hold d/t injurt    #Fall  --S/P Mechanical fall w/ head strike  --CT Angio w/ 9.1 x 4.1 cm hematoma in soft tissues of the posterior forearm with scattered punctate hyperdense foci, concern for contrast extravasation, Left forearm soft tissue hematoma with small foci of contrast extravasation.  --CT H without acute findings   --X-rays neg for acute fractures or dislocations  --Per Hand surgery, c/w elevation of forearm on pillows and blankets keeping elbow at 90 degrees  --Vascular surgery and Hand Surgery evals appreciated; F/u recs       will follow    Luzmaria Xiong NP  Hematology/ Oncology  [facility]  [phone] (office)  [phone] (alt office)  Evenings and weekends please call MD on call or office

## 2024-06-04 NOTE — H&P ADULT - PROBLEM SELECTOR PLAN 6
VTE ppx: holding chemical ppx d/t active bleeding.   GI ppx: n/i -reviewed medication list from previous admission with patient as pt was unable to fully list all his meds. Even after reviewing the list, pt felt that he was missing some medications but could not say. Pt reports no one at home to call. Please call pharmacy in the morning to clarify.   -also, multiple notes indicating pt was on therapeutic Lovenox for h/o DVT, but on med list, pt was rx'd prophylactic dose. Please clarify with pharmacy.

## 2024-06-04 NOTE — PROGRESS NOTE ADULT - ASSESSMENT
Estiven Simeon is a 54M with PMHx history of CVA c/b expressive aphasia, B/L DVTs (therapeutic Lovenox), LLE compartment syndrome s/p fasciotomy, and left groin hematoma s/p debridement in April 2024. Vascular surgery consulted for evaluation of L thigh wound.     PLAN:  -Wound vac taken down at bedside, wound healthy with pink granulation tissue  -Appreciate PT consult for management of vac  -D/w Dr. Christie     Vascular Surgery, [id number]

## 2024-06-05 LAB
ALBUMIN SERPL ELPH-MCNC: 3.4 G/DL — SIGNIFICANT CHANGE UP (ref 3.3–5)
ALP SERPL-CCNC: 70 U/L — SIGNIFICANT CHANGE UP (ref 40–120)
ALT FLD-CCNC: 19 U/L — SIGNIFICANT CHANGE UP (ref 10–45)
ANION GAP SERPL CALC-SCNC: 13 MMOL/L — SIGNIFICANT CHANGE UP (ref 5–17)
AST SERPL-CCNC: 16 U/L — SIGNIFICANT CHANGE UP (ref 10–40)
BASOPHILS # BLD AUTO: 0.04 K/UL — SIGNIFICANT CHANGE UP (ref 0–0.2)
BASOPHILS NFR BLD AUTO: 0.7 % — SIGNIFICANT CHANGE UP (ref 0–2)
BILIRUB SERPL-MCNC: 0.2 MG/DL — SIGNIFICANT CHANGE UP (ref 0.2–1.2)
BUN SERPL-MCNC: 14 MG/DL — SIGNIFICANT CHANGE UP (ref 7–23)
CALCIUM SERPL-MCNC: 8.4 MG/DL — SIGNIFICANT CHANGE UP (ref 8.4–10.5)
CHLORIDE SERPL-SCNC: 107 MMOL/L — SIGNIFICANT CHANGE UP (ref 96–108)
CK SERPL-CCNC: 171 U/L — SIGNIFICANT CHANGE UP (ref 30–200)
CO2 SERPL-SCNC: 22 MMOL/L — SIGNIFICANT CHANGE UP (ref 22–31)
CREAT SERPL-MCNC: 0.94 MG/DL — SIGNIFICANT CHANGE UP (ref 0.5–1.3)
CULTURE RESULTS: SIGNIFICANT CHANGE UP
EGFR: 97 ML/MIN/1.73M2 — SIGNIFICANT CHANGE UP
EOSINOPHIL # BLD AUTO: 0.57 K/UL — HIGH (ref 0–0.5)
EOSINOPHIL NFR BLD AUTO: 9.7 % — HIGH (ref 0–6)
GLUCOSE SERPL-MCNC: 95 MG/DL — SIGNIFICANT CHANGE UP (ref 70–99)
HCT VFR BLD CALC: 32 % — LOW (ref 39–50)
HCT VFR BLD CALC: 33.9 % — LOW (ref 39–50)
HGB BLD-MCNC: 10.7 G/DL — LOW (ref 13–17)
HGB BLD-MCNC: 11 G/DL — LOW (ref 13–17)
IMM GRANULOCYTES NFR BLD AUTO: 0.5 % — SIGNIFICANT CHANGE UP (ref 0–0.9)
LYMPHOCYTES # BLD AUTO: 1.25 K/UL — SIGNIFICANT CHANGE UP (ref 1–3.3)
LYMPHOCYTES # BLD AUTO: 21.3 % — SIGNIFICANT CHANGE UP (ref 13–44)
MAGNESIUM SERPL-MCNC: 2.2 MG/DL — SIGNIFICANT CHANGE UP (ref 1.6–2.6)
MCHC RBC-ENTMCNC: 28.6 PG — SIGNIFICANT CHANGE UP (ref 27–34)
MCHC RBC-ENTMCNC: 29.1 PG — SIGNIFICANT CHANGE UP (ref 27–34)
MCHC RBC-ENTMCNC: 32.4 GM/DL — SIGNIFICANT CHANGE UP (ref 32–36)
MCHC RBC-ENTMCNC: 33.4 GM/DL — SIGNIFICANT CHANGE UP (ref 32–36)
MCV RBC AUTO: 87 FL — SIGNIFICANT CHANGE UP (ref 80–100)
MCV RBC AUTO: 88.3 FL — SIGNIFICANT CHANGE UP (ref 80–100)
MONOCYTES # BLD AUTO: 0.63 K/UL — SIGNIFICANT CHANGE UP (ref 0–0.9)
MONOCYTES NFR BLD AUTO: 10.7 % — SIGNIFICANT CHANGE UP (ref 2–14)
NEUTROPHILS # BLD AUTO: 3.35 K/UL — SIGNIFICANT CHANGE UP (ref 1.8–7.4)
NEUTROPHILS NFR BLD AUTO: 57.1 % — SIGNIFICANT CHANGE UP (ref 43–77)
NRBC # BLD: 0 /100 WBCS — SIGNIFICANT CHANGE UP (ref 0–0)
NRBC # BLD: 0 /100 WBCS — SIGNIFICANT CHANGE UP (ref 0–0)
PHOSPHATE SERPL-MCNC: 3.6 MG/DL — SIGNIFICANT CHANGE UP (ref 2.5–4.5)
PLATELET # BLD AUTO: 155 K/UL — SIGNIFICANT CHANGE UP (ref 150–400)
PLATELET # BLD AUTO: 166 K/UL — SIGNIFICANT CHANGE UP (ref 150–400)
POTASSIUM SERPL-MCNC: 3.7 MMOL/L — SIGNIFICANT CHANGE UP (ref 3.5–5.3)
POTASSIUM SERPL-SCNC: 3.7 MMOL/L — SIGNIFICANT CHANGE UP (ref 3.5–5.3)
PROT SERPL-MCNC: 6.2 G/DL — SIGNIFICANT CHANGE UP (ref 6–8.3)
RBC # BLD: 3.68 M/UL — LOW (ref 4.2–5.8)
RBC # BLD: 3.84 M/UL — LOW (ref 4.2–5.8)
RBC # FLD: 14.9 % — HIGH (ref 10.3–14.5)
RBC # FLD: 15.1 % — HIGH (ref 10.3–14.5)
SODIUM SERPL-SCNC: 142 MMOL/L — SIGNIFICANT CHANGE UP (ref 135–145)
SPECIMEN SOURCE: SIGNIFICANT CHANGE UP
WBC # BLD: 5.87 K/UL — SIGNIFICANT CHANGE UP (ref 3.8–10.5)
WBC # BLD: 5.97 K/UL — SIGNIFICANT CHANGE UP (ref 3.8–10.5)
WBC # FLD AUTO: 5.87 K/UL — SIGNIFICANT CHANGE UP (ref 3.8–10.5)
WBC # FLD AUTO: 5.97 K/UL — SIGNIFICANT CHANGE UP (ref 3.8–10.5)

## 2024-06-05 PROCEDURE — 95816 EEG AWAKE AND DROWSY: CPT | Mod: 26

## 2024-06-05 RX ORDER — ACETAMINOPHEN 500 MG
650 TABLET ORAL ONCE
Refills: 0 | Status: COMPLETED | OUTPATIENT
Start: 2024-06-05 | End: 2024-06-05

## 2024-06-05 RX ADMIN — GABAPENTIN 300 MILLIGRAM(S): 400 CAPSULE ORAL at 13:31

## 2024-06-05 RX ADMIN — GABAPENTIN 300 MILLIGRAM(S): 400 CAPSULE ORAL at 21:18

## 2024-06-05 RX ADMIN — ATORVASTATIN CALCIUM 20 MILLIGRAM(S): 80 TABLET, FILM COATED ORAL at 21:18

## 2024-06-05 RX ADMIN — LACOSAMIDE 150 MILLIGRAM(S): 50 TABLET ORAL at 06:52

## 2024-06-05 RX ADMIN — LACOSAMIDE 150 MILLIGRAM(S): 50 TABLET ORAL at 17:28

## 2024-06-05 RX ADMIN — HYDROMORPHONE HYDROCHLORIDE 0.5 MILLIGRAM(S): 2 INJECTION INTRAMUSCULAR; INTRAVENOUS; SUBCUTANEOUS at 22:46

## 2024-06-05 RX ADMIN — HYDROMORPHONE HYDROCHLORIDE 0.5 MILLIGRAM(S): 2 INJECTION INTRAMUSCULAR; INTRAVENOUS; SUBCUTANEOUS at 22:31

## 2024-06-05 RX ADMIN — Medication 650 MILLIGRAM(S): at 23:46

## 2024-06-05 RX ADMIN — PANTOPRAZOLE SODIUM 40 MILLIGRAM(S): 20 TABLET, DELAYED RELEASE ORAL at 06:52

## 2024-06-05 RX ADMIN — GABAPENTIN 300 MILLIGRAM(S): 400 CAPSULE ORAL at 06:52

## 2024-06-05 RX ADMIN — TAMSULOSIN HYDROCHLORIDE 0.4 MILLIGRAM(S): 0.4 CAPSULE ORAL at 21:18

## 2024-06-05 NOTE — PROGRESS NOTE ADULT - ASSESSMENT
53y Male with pmhx CVA and Hx of DVT's presenting s/p fall    #Anemia  #DVTs  --follows with Dr Varela of [facility]  --Hgb 13-14 at baseline, outpatient blood work done 5/24 w/ Hgb of 12.4   --no nutritional deficiencies noted on outpatient blood work from 5/24  --pt has a small PFO   ­-failure of therapy with Eliquis, hypercoag work up negative in past  --transfuse for Hb>7  --pt was on AC prophylaxis w/ Lovenox 40 and ASA 81--> currently on hold d/t injurt    #Fall  --S/P Mechanical fall w/ head strike  --CT Angio w/ 9.1 x 4.1 cm hematoma in soft tissues of the posterior forearm with scattered punctate hyperdense foci, concern for contrast extravasation, Left forearm soft tissue hematoma with small foci of contrast extravasation.  --CT H without acute findings   --X-rays neg for acute fractures or dislocations  --Per Hand surgery, c/w elevation of forearm on pillows and blankets keeping elbow at 90 degrees  --Vascular surgery and Hand Surgery evals appreciated; F/u recs       will follow    Luzmaria Xiong NP  Hematology/ Oncology  [facility]  [phone] (office)  [phone] (alt office)  Evenings and weekends please call MD on call or office

## 2024-06-05 NOTE — EEG REPORT - NS EEG TEXT BOX
REPORT OF ROUTINE EEG WITH VIDEO    [facility]: [address], [address] [place] [address], Phone: [phone]  [facility]: [address] [place] [address], [place] [address], Phone: [phone]  Office: [address] [place] [address], [place] [address], [place] [address], Phone: [phone]    Patient Name: Estiven Simeon    Age: 53 year  : 1970    EEG #: [id number]  Study Date: 2024   Start Time: 9:44:13 AM     Study Duration: 20.9 min  		    Technical Information:					  On Instrument: [id number]  Placement and Labeling of Electrodes:  The EEG was performed utilizing 20 channels referential EEG connections (coronal over temporal over parasagittal montage) using all standard 10-20 electrode placements with EKG.  Recording was at a sampling rate of 256 samples per second per channel.  Time synchronized digital video recording was done simultaneously with EEG recording.  A low light infrared camera was used for low light recording.  Donnie and seizure detection algorithms were utilized.  CSA Technical Component:  Quantitative EEG analysis using a separate Compressed Spectral Array (CSA) software package was conducted in real-time and run at bedside after set up by the technician, digitally displaying the power of electrographic frequencies included in the 1-30Hz band using a graded color map.  This data was reviewed and interpreted independently, and is reported in a separate section below.    History:  -53 year old Male is here to rule out seizures      Medication  Flomax    Melatonin    Vimpat (Lacosamide)    Neurontin    Lipitor      Study Interpretation:    FINDINGS:  The background was continuous, spontaneously variable and reactive.  During wakefulness, the posteriorly dominant rhythm consisted of symmetric, poorly modulated 7-8 Hz activity, with an amplitude to 30 uV, that attenuated to eye opening.  Low amplitude central beta was noted in wakefulness.    Background Slowing:  Generalized slowing: present. as mentioned above  Focal slowing: none was present.    Sleep Background:  -Drowsiness was characterized by fragmentation, attenuation, and slowing of the background activity.    -N2 sleep transients with symmetric spindles and poorly formed K-complexes.      Non-epileptiform activity:  None.    Epileptiform Activity:   No epileptiform discharges were present.    Events:  No clinical events were recorded.  No seizures were recorded.    Activation Procedures:   -Hyperventilation was not performed.    -Photic stimulation was not performed.    Artifacts:  Intermittent myogenic and movement artifacts were noted.    ECG:  No significant abnormality      EEG Classification / Summary:    Abnormal EEG in the awake, drowsy and asleep states.  - Background slowing including PDR < 8.5 Hz      Clinical Impression:    Mild nonspecific diffuse or multifocal cerebral dysfunction.     No epileptiform pattern or seizure recorded.    ________________________________________    ISAAC Barajas  Attending Physician, [facility]    ------------------------------------  EEG Reading Room: [phone]  On Call Service After Hours: [phone]

## 2024-06-05 NOTE — PROGRESS NOTE ADULT - ASSESSMENT
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 Poor opacification of the arteries in the distal   forearm and hand. There is a 9.1 x 4.1 cm hematoma in the soft tissues of   the posterior forearm with scattered punctate hyperdense foci (304, 118,  119 and 122), suspicious for contrast extravasation.    Impression:   1) chronic strokes, resid minimal WFD  2) seizure d/o 2/2 strokes  3) ADHD  4) vertigo BPPV, stable   5) chornic L thigh hematoma   6) LLE compartment syndrome   7) new Left forearm hematoma   8) fall      - f/u eeg results; prelim neg for seizures; mild slowing ; will consider lowering vimpat to 100 BID   - f/u vasc and vac team ; wound vac   -  was on ASA 81mg daily and lovenox 40mg    ; resume when able   - monitor H/H.      - heme/onc recs appreicated   - monitor for compartment syndrome   - Lovenox full dose now held.  was injecting in thigh at site of hematoma ; previously was on full dose then charged to 40mg SQ   - for ADHD gets Vyvanse 50mg daily  - for seizure he is on vimpat 150 mg BID  - statin therapy with LDL goal < 70mg/dL; atorvastatin 20 at home   - meclizline PRN   - PT/OT   - check FS, glucose control <180  - GI/DVT ppx   - Thank you for allowing me to participate in the care of this patient. Call with questions.      Braxton Forde MD  Vascular Neurology  Office: [phone]

## 2024-06-05 NOTE — PROGRESS NOTE ADULT - ASSESSMENT
Estiven Simeon is a 54 y.owith history of CVA c/b expressive aphasia, B/L DVTs (therapeutic lovenox), LLE compartment syndrome s/p fasciotomy, and left groin hematoma s/p debridement who presented to the ED after fall onto left upper extremity from standing.     RECOMMENDATIONS  - Low concern for compartment syndrome at this time, compartments soft and pt clinically improving  - Continue LUE elevation  - OK for DC home from hand surgery perspective  - Defer restarting AC to primary team  - Please reach out with any changes to exam    Plastic Surgery   [facility]: [id number]  [facility]: [phone]

## 2024-06-05 NOTE — PROGRESS NOTE ADULT - ASSESSMENT
53M admitted for hematoma on the left forearm.       Traumatic hematoma of left upper arm.   - S/P Mechanical fall (Walker got caught on sidewalk)  - CT Angio w/ 9.1 x 4.1 cm hematoma in soft tissues of   the posterior forearm with scattered punctate hyperdense foci, concern for contrast extravasation, Left forearm soft tissue hematoma with small foci of contrast extravasation.  - CT H without acute findings   - X-rays neg for acute fractures or dislocations  - Lovenox on hold. Check CBC BID  - CK down-trended to WNL   - Monitor for compartment syndrome   - Neurovascular checks   - Per Hand surgery, c/w elevation of forearm on pillows and blankets keeping elbow at 90 degrees  - Vascular surgery and Hand Surgery evals appreciated; F/u recs     H/O DVT, Anemia   - Pt was on Lovenox full dose for AC. Later changed to Lovenox 40 Qd  - LE Duplex negative   - Check serial CBC BID; Monitor H/H   - Heme/Onc eval appreciated: F/u recs --> AC on hold for now, trend CBC BID, resume when cleared by surgery     Seizure disorder.   - C/w Lacosamide  - Seizure precautions  - Neuro eval appreciated; F/u recs    History of CVA (cerebrovascular accident).   - C/w home ASA and Liptior  - Episode of reported "confusion" on 6/4 PM. Check EEG     Encounter for postoperative wound care.   - Pt with left groin wound vac from previous fasciotomy   - Wound vac ran out of battery 6/4 AM. Vascular surgery team notified --> Wound vac per vascular / PT wound care    - PT Wound care eval      Chronic pain.   - On gabapentin.  - Reports he had not taken oxycodone or tramadol in a while.    BPH  - C/w Flomax    Medication management.  - Have asked pharmacy to obtain med rec    Prophylactic measure.   - VTE ppx: holding chemical ppx d/t active bleeding.   - PPI       Discussed with Attending and ACP

## 2024-06-06 LAB
ALBUMIN SERPL ELPH-MCNC: 3.4 G/DL — SIGNIFICANT CHANGE UP (ref 3.3–5)
ALP SERPL-CCNC: 67 U/L — SIGNIFICANT CHANGE UP (ref 40–120)
ALT FLD-CCNC: 18 U/L — SIGNIFICANT CHANGE UP (ref 10–45)
ANION GAP SERPL CALC-SCNC: 15 MMOL/L — SIGNIFICANT CHANGE UP (ref 5–17)
AST SERPL-CCNC: 14 U/L — SIGNIFICANT CHANGE UP (ref 10–40)
BASOPHILS # BLD AUTO: 0.07 K/UL — SIGNIFICANT CHANGE UP (ref 0–0.2)
BASOPHILS NFR BLD AUTO: 1.3 % — SIGNIFICANT CHANGE UP (ref 0–2)
BILIRUB SERPL-MCNC: 0.2 MG/DL — SIGNIFICANT CHANGE UP (ref 0.2–1.2)
BUN SERPL-MCNC: 18 MG/DL — SIGNIFICANT CHANGE UP (ref 7–23)
CALCIUM SERPL-MCNC: 8.1 MG/DL — LOW (ref 8.4–10.5)
CHLORIDE SERPL-SCNC: 108 MMOL/L — SIGNIFICANT CHANGE UP (ref 96–108)
CO2 SERPL-SCNC: 19 MMOL/L — LOW (ref 22–31)
CREAT SERPL-MCNC: 1.1 MG/DL — SIGNIFICANT CHANGE UP (ref 0.5–1.3)
EGFR: 80 ML/MIN/1.73M2 — SIGNIFICANT CHANGE UP
EOSINOPHIL # BLD AUTO: 0.56 K/UL — HIGH (ref 0–0.5)
EOSINOPHIL NFR BLD AUTO: 10.1 % — HIGH (ref 0–6)
GLUCOSE SERPL-MCNC: 94 MG/DL — SIGNIFICANT CHANGE UP (ref 70–99)
HCT VFR BLD CALC: 32.5 % — LOW (ref 39–50)
HCT VFR BLD CALC: 33.7 % — LOW (ref 39–50)
HGB BLD-MCNC: 10.2 G/DL — LOW (ref 13–17)
HGB BLD-MCNC: 10.9 G/DL — LOW (ref 13–17)
IMM GRANULOCYTES NFR BLD AUTO: 0.4 % — SIGNIFICANT CHANGE UP (ref 0–0.9)
LYMPHOCYTES # BLD AUTO: 1.32 K/UL — SIGNIFICANT CHANGE UP (ref 1–3.3)
LYMPHOCYTES # BLD AUTO: 23.7 % — SIGNIFICANT CHANGE UP (ref 13–44)
MAGNESIUM SERPL-MCNC: 2.3 MG/DL — SIGNIFICANT CHANGE UP (ref 1.6–2.6)
MCHC RBC-ENTMCNC: 28.3 PG — SIGNIFICANT CHANGE UP (ref 27–34)
MCHC RBC-ENTMCNC: 28.8 PG — SIGNIFICANT CHANGE UP (ref 27–34)
MCHC RBC-ENTMCNC: 31.4 GM/DL — LOW (ref 32–36)
MCHC RBC-ENTMCNC: 32.3 GM/DL — SIGNIFICANT CHANGE UP (ref 32–36)
MCV RBC AUTO: 88.9 FL — SIGNIFICANT CHANGE UP (ref 80–100)
MCV RBC AUTO: 90.3 FL — SIGNIFICANT CHANGE UP (ref 80–100)
MONOCYTES # BLD AUTO: 0.64 K/UL — SIGNIFICANT CHANGE UP (ref 0–0.9)
MONOCYTES NFR BLD AUTO: 11.5 % — SIGNIFICANT CHANGE UP (ref 2–14)
NEUTROPHILS # BLD AUTO: 2.95 K/UL — SIGNIFICANT CHANGE UP (ref 1.8–7.4)
NEUTROPHILS NFR BLD AUTO: 53 % — SIGNIFICANT CHANGE UP (ref 43–77)
NRBC # BLD: 0 /100 WBCS — SIGNIFICANT CHANGE UP (ref 0–0)
NRBC # BLD: 0 /100 WBCS — SIGNIFICANT CHANGE UP (ref 0–0)
PHOSPHATE SERPL-MCNC: 4 MG/DL — SIGNIFICANT CHANGE UP (ref 2.5–4.5)
PLATELET # BLD AUTO: 168 K/UL — SIGNIFICANT CHANGE UP (ref 150–400)
PLATELET # BLD AUTO: 169 K/UL — SIGNIFICANT CHANGE UP (ref 150–400)
POTASSIUM SERPL-MCNC: 4 MMOL/L — SIGNIFICANT CHANGE UP (ref 3.5–5.3)
POTASSIUM SERPL-SCNC: 4 MMOL/L — SIGNIFICANT CHANGE UP (ref 3.5–5.3)
PROT SERPL-MCNC: 6.3 G/DL — SIGNIFICANT CHANGE UP (ref 6–8.3)
RBC # BLD: 3.6 M/UL — LOW (ref 4.2–5.8)
RBC # BLD: 3.79 M/UL — LOW (ref 4.2–5.8)
RBC # FLD: 15 % — HIGH (ref 10.3–14.5)
RBC # FLD: 15.3 % — HIGH (ref 10.3–14.5)
SODIUM SERPL-SCNC: 142 MMOL/L — SIGNIFICANT CHANGE UP (ref 135–145)
WBC # BLD: 5.56 K/UL — SIGNIFICANT CHANGE UP (ref 3.8–10.5)
WBC # BLD: 5.92 K/UL — SIGNIFICANT CHANGE UP (ref 3.8–10.5)
WBC # FLD AUTO: 5.56 K/UL — SIGNIFICANT CHANGE UP (ref 3.8–10.5)
WBC # FLD AUTO: 5.92 K/UL — SIGNIFICANT CHANGE UP (ref 3.8–10.5)

## 2024-06-06 RX ORDER — ENOXAPARIN SODIUM 100 MG/ML
40 INJECTION SUBCUTANEOUS EVERY 24 HOURS
Refills: 0 | Status: DISCONTINUED | OUTPATIENT
Start: 2024-06-06 | End: 2024-06-11

## 2024-06-06 RX ORDER — ACETAMINOPHEN 500 MG
650 TABLET ORAL EVERY 6 HOURS
Refills: 0 | Status: DISCONTINUED | OUTPATIENT
Start: 2024-06-06 | End: 2024-06-11

## 2024-06-06 RX ADMIN — GABAPENTIN 300 MILLIGRAM(S): 400 CAPSULE ORAL at 20:43

## 2024-06-06 RX ADMIN — LACOSAMIDE 150 MILLIGRAM(S): 50 TABLET ORAL at 04:56

## 2024-06-06 RX ADMIN — Medication 650 MILLIGRAM(S): at 13:08

## 2024-06-06 RX ADMIN — HYDROMORPHONE HYDROCHLORIDE 0.5 MILLIGRAM(S): 2 INJECTION INTRAMUSCULAR; INTRAVENOUS; SUBCUTANEOUS at 22:00

## 2024-06-06 RX ADMIN — TAMSULOSIN HYDROCHLORIDE 0.4 MILLIGRAM(S): 0.4 CAPSULE ORAL at 20:43

## 2024-06-06 RX ADMIN — GABAPENTIN 300 MILLIGRAM(S): 400 CAPSULE ORAL at 13:01

## 2024-06-06 RX ADMIN — HYDROMORPHONE HYDROCHLORIDE 0.5 MILLIGRAM(S): 2 INJECTION INTRAMUSCULAR; INTRAVENOUS; SUBCUTANEOUS at 21:44

## 2024-06-06 RX ADMIN — ATORVASTATIN CALCIUM 20 MILLIGRAM(S): 80 TABLET, FILM COATED ORAL at 20:42

## 2024-06-06 RX ADMIN — Medication 650 MILLIGRAM(S): at 00:46

## 2024-06-06 RX ADMIN — ENOXAPARIN SODIUM 40 MILLIGRAM(S): 100 INJECTION SUBCUTANEOUS at 18:08

## 2024-06-06 RX ADMIN — GABAPENTIN 300 MILLIGRAM(S): 400 CAPSULE ORAL at 04:57

## 2024-06-06 RX ADMIN — Medication 650 MILLIGRAM(S): at 12:38

## 2024-06-06 RX ADMIN — LACOSAMIDE 150 MILLIGRAM(S): 50 TABLET ORAL at 17:16

## 2024-06-06 RX ADMIN — PANTOPRAZOLE SODIUM 40 MILLIGRAM(S): 20 TABLET, DELAYED RELEASE ORAL at 04:56

## 2024-06-06 NOTE — DIETITIAN INITIAL EVALUATION ADULT - ADD RECOMMEND
1) Discontinue DASH diet, recommend regular, no therapeutic restrictions, defer diet texture/fluid consistency to team   2) Add multivitamin and vitamin C daily for wound healing pending no medical contraindications   3) Monitor nutritional intake, diet tolerance, labs, hydration, GI function, skin integrity and wt trends

## 2024-06-06 NOTE — DIETITIAN INITIAL EVALUATION ADULT - PERTINENT LABORATORY DATA
06-06    142  |  108  |  18  ----------------------------<  94  4.0   |  19<L>  |  1.10    Ca    8.1<L>      06 Jun 2024 07:39  Phos  4.0     06-06  Mg     2.3     06-06    TPro  6.3  /  Alb  3.4  /  TBili  0.2  /  DBili  x   /  AST  14  /  ALT  18  /  AlkPhos  67  06-06

## 2024-06-06 NOTE — PROGRESS NOTE ADULT - ASSESSMENT
53M admitted for hematoma on the left forearm.       Traumatic hematoma of left upper arm.   - S/P Mechanical fall (Walker got caught on sidewalk)  - CT Angio w/ 9.1 x 4.1 cm hematoma in soft tissues of   the posterior forearm with scattered punctate hyperdense foci, concern for contrast extravasation, Left forearm soft tissue hematoma with small foci of contrast extravasation.  - CT H without acute findings   - X-rays neg for acute fractures or dislocations  - CK down-trended to WNL   - Monitor for compartment syndrome   - Neurovascular checks   - Per Hand surgery, c/w elevation of forearm on pillows and blankets keeping elbow at 90 degrees  - Hand surgery clearance for AC appreciated. Home Lovenox 40 Qd resumed. Monitor   - Vascular surgery and Hand Surgery evals appreciated; F/u recs     H/O DVT, Anemia   - Pt was on Lovenox full dose for AC. Later changed to Lovenox 40 Qd  - LE Duplex negative   - Check serial CBC BID; Monitor H/H   - Heme/Onc eval appreciated: F/u recs --> AC resumed, trend CBC BID, cleared by surgery     Seizure disorder.   - EEG w/ mild slowing, neg for seizures-  AED per neuro  - On Lacosamide  - Seizure precautions  - Neuro eval appreciated; F/u recs    History of CVA (cerebrovascular accident).   - C/w home ASA and Lipitor  - Episode of reported "confusion" on 6/4 PM. EEG w/ mild slowing, neg for seizures AED per neuro      Encounter for postoperative wound care.   - Pt with left groin wound vac from previous fasciotomy   - Wound vac ran out of battery 6/4 AM. Vascular surgery team notified --> Wound vac per vascular / PT wound care    - PT Wound care eval, wound vac in place     Chronic pain.   - On gabapentin.  - Reports he had not taken oxycodone or tramadol in a while.    BPH  - C/w Flomax    Medication management.  - Have asked pharmacy to obtain med rec    Prophylactic measure.   - VTE ppx: Lovenox resumed   - PPI       Discussed with Attending

## 2024-06-06 NOTE — DIETITIAN INITIAL EVALUATION ADULT - PROBLEM SELECTOR PLAN 6
-reviewed medication list from previous admission with patient as pt was unable to fully list all his meds. Even after reviewing the list, pt felt that he was missing some medications but could not say. Pt reports no one at home to call. Please call pharmacy in the morning to clarify.   -also, multiple notes indicating pt was on therapeutic Lovenox for h/o DVT, but on med list, pt was rx'd prophylactic dose. Please clarify with pharmacy.

## 2024-06-06 NOTE — DIETITIAN INITIAL EVALUATION ADULT - PERTINENT MEDS FT
MEDICATIONS  (STANDING):  acetaminophen   IVPB .. 1000 milliGRAM(s) IV Intermittent once  atorvastatin 20 milliGRAM(s) Oral at bedtime  gabapentin 300 milliGRAM(s) Oral three times a day  lacosamide 150 milliGRAM(s) Oral two times a day  lidocaine 1% Injectable 5 milliLiter(s) Local Injection Once  pantoprazole    Tablet 40 milliGRAM(s) Oral before breakfast  sodium chloride 0.9%. 1000 milliLiter(s) (50 mL/Hr) IV Continuous <Continuous>  tamsulosin 0.4 milliGRAM(s) Oral at bedtime    MEDICATIONS  (PRN):  aluminum hydroxide/magnesium hydroxide/simethicone Suspension 30 milliLiter(s) Oral every 4 hours PRN Dyspepsia  HYDROmorphone  Injectable 0.5 milliGRAM(s) IV Push every 4 hours PRN Severe Pain (7 - 10)  melatonin 3 milliGRAM(s) Oral at bedtime PRN Insomnia  ondansetron Injectable 4 milliGRAM(s) IV Push every 8 hours PRN Nausea and/or Vomiting

## 2024-06-06 NOTE — DIETITIAN INITIAL EVALUATION ADULT - NSFNSGIIOFT_GEN_A_CORE
I&O's Detail    05 Jun 2024 07:01  -  06 Jun 2024 07:00  --------------------------------------------------------  IN:  Total IN: 0 mL    OUT:    Voided (mL): 650 mL  Total OUT: 650 mL    Total NET: -650 mL

## 2024-06-06 NOTE — DIETITIAN INITIAL EVALUATION ADULT - NS FNS DIET ORDER
Problem: Patient Care Overview  Goal: Discharge Needs Assessment  CARE PLAN NOTE:   1. Continue to provide support and assessment re: patient's adjustment to illness and coping.   2. Continue to provide support and assessment re: caregiver's adjustment to role of caregiver and coping.   3. Continue to complete ongoing needs assessment for appropriate resources.   4. Patient will remain involved with discharge planning.           Diet, Regular:   DASH/TLC {Sodium & Cholesterol Restricted} (DASH) (06-04-24 @ 01:25) [Active]

## 2024-06-06 NOTE — DIETITIAN INITIAL EVALUATION ADULT - NSFNSGIASSESSMENTFT_GEN_A_CORE
Denies N/V, reports some constipation, last BM 6/3. Not currently ordered for a bowel regimen.   - Ordered for protonix, zofran, and aluminum hydroxide/mg hydroxide/simethicone suspension

## 2024-06-06 NOTE — DIETITIAN INITIAL EVALUATION ADULT - PHYSCIAL ASSESSMENT
Pt reports UBW of 210 pounds, unsure of any wt changes but thinking none.     Current dosing wt 233 pounds   No daily wts in chart to assess.  Wt hx per Silver JARAMILLO with recent wts around 190-210 pounds (data not loading for specific dates)    Unclear if true wt gain vs. inaccurate dosing wt. RD unable to obtain bedscale wt as pt out of bed to chair. RD to continue to monitor wt as able   IBW: 196 pounds

## 2024-06-06 NOTE — PROGRESS NOTE ADULT - NS ATTEND AMEND GEN_ALL_CORE FT
Pt care and plan discussed and reviewed with PA. Plan as outlined above edited by me to reflect our discussion. Advanced care planning/advanced directives discussed with patient/family. DNR status including forceful chest compressions to attempt to restart the heart, ventilator support/artificial breathing, electric shock, artificial nutrition, health care proxy, Molst form all discussed with pt. More than 50% of the visit was spent counseling and/or coordinating care by the attending physician.
Pt care and plan discussed and reviewed with PA. Plan as outlined above edited by me to reflect our discussion. Advanced care planning/advanced directives discussed with patient/family. DNR status including forceful chest compressions to attempt to restart the heart, ventilator support/artificial breathing, electric shock, artificial nutrition, health care proxy, Molst form all discussed with pt. More than 50% of the visit was spent counseling and/or coordinating care by the attending physician.
slight left arm swelling. can resume aspirin and lovenox when cleared by surgery
Pt care and plan discussed and reviewed with PA. Plan as outlined above edited by me to reflect our discussion. Advanced care planning/advanced directives discussed with patient/family. DNR status including forceful chest compressions to attempt to restart the heart, ventilator support/artificial breathing, electric shock, artificial nutrition, health care proxy, Molst form all discussed with pt. More than 50% of the visit was spent counseling and/or coordinating care by the attending physician.

## 2024-06-06 NOTE — PROGRESS NOTE ADULT - ASSESSMENT
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 Poor opacification of the arteries in the distal   forearm and hand. There is a 9.1 x 4.1 cm hematoma in the soft tissues of   the posterior forearm with scattered punctate hyperdense foci (304, 118,  119 and 122), suspicious for contrast extravasation.  EEG neg but mild slowing     Impression:   1) chronic strokes, resid minimal WFD  2) seizure d/o 2/2 strokes  3) ADHD  4) vertigo BPPV, stable   5) chornic L thigh hematoma   6) LLE compartment syndrome   7) new Left forearm hematoma   8) fall      -  eeg results;  neg for seizures; mild slowing ; will consider lowering vimpat to 100 BID   - f/u vasc and vac team ; wound vac   -  was on ASA 81mg daily and lovenox 40mg    ; resume when able   - monitor H/H.      - heme/onc recs appreicated   - monitor for compartment syndrome   - Lovenox full dose now held.  was injecting in thigh at site of hematoma ; previously was on full dose then charged to 40mg SQ   - for ADHD gets Vyvanse 50mg daily  - for seizure he is on vimpat 150 mg BID  - statin therapy with LDL goal < 70mg/dL; atorvastatin 20 at home   - meclizline PRN   - PT/OT   - check FS, glucose control <180  - GI/DVT ppx   - Thank you for allowing me to participate in the care of this patient. Call with questions.      Braxton Forde MD  Vascular Neurology  Office: [phone]

## 2024-06-06 NOTE — PROGRESS NOTE ADULT - ASSESSMENT
Estiven Simeon is a 54 y.owith history of CVA c/b expressive aphasia, B/L DVTs (therapeutic lovenox), LLE compartment syndrome s/p fasciotomy, and left groin hematoma s/p debridement who presented to the ED after fall onto left upper extremity from standing.     RECOMMENDATIONS  - Low concern for compartment syndrome at this time, compartments soft and pt clinically improving  - Continue LUE elevation  - OK for DC home from hand surgery perspective  - Restarting AC is likely safe at this time; likely low risk for continued bleeding; full risks/benefits for ongoing therapeutic AC for prior DVT to be discussed by heme and primary team  - Please reach out with any changes to exam    Plastic Surgery   [facility]: [id number]  [facility]: [phone]

## 2024-06-06 NOTE — DIETITIAN INITIAL EVALUATION ADULT - ORAL INTAKE PTA/DIET HISTORY
PTA per pt  -Intake: good typical intake, consumes 3 meals/day, does not follow any therapeutic diets at home, denies any recent changes in appetite.    -Chewing/Swallowing: denies hx of difficulty    -Allergies/Intolerances: confirms NKFA   -Vitamins/Supplements: multivitamin and calcium per H&P

## 2024-06-06 NOTE — DIETITIAN INITIAL EVALUATION ADULT - OTHER INFO
- Pt admitted with hematoma of L upper arm s/p mechanical fall; per neuro (6/6) pt with hx of strokes, seizure disorder, and vertigo, EEG negative with mild slowing  - Pt with chronic L thigh hematoma with recent fasciotomy and wound vac placed (4/2024) in setting of compartment syndrome  - Ordered for NaCl 0.9% @ 50mL/hr

## 2024-06-07 ENCOUNTER — TRANSCRIPTION ENCOUNTER (OUTPATIENT)
Age: 54
End: 2024-06-07

## 2024-06-07 LAB
ALBUMIN SERPL ELPH-MCNC: 3.4 G/DL — SIGNIFICANT CHANGE UP (ref 3.3–5)
ALP SERPL-CCNC: 63 U/L — SIGNIFICANT CHANGE UP (ref 40–120)
ALT FLD-CCNC: 19 U/L — SIGNIFICANT CHANGE UP (ref 10–45)
ANION GAP SERPL CALC-SCNC: 13 MMOL/L — SIGNIFICANT CHANGE UP (ref 5–17)
AST SERPL-CCNC: 14 U/L — SIGNIFICANT CHANGE UP (ref 10–40)
BASOPHILS # BLD AUTO: 0.05 K/UL — SIGNIFICANT CHANGE UP (ref 0–0.2)
BASOPHILS NFR BLD AUTO: 0.8 % — SIGNIFICANT CHANGE UP (ref 0–2)
BILIRUB SERPL-MCNC: 0.3 MG/DL — SIGNIFICANT CHANGE UP (ref 0.2–1.2)
BUN SERPL-MCNC: 18 MG/DL — SIGNIFICANT CHANGE UP (ref 7–23)
CALCIUM SERPL-MCNC: 8.5 MG/DL — SIGNIFICANT CHANGE UP (ref 8.4–10.5)
CHLORIDE SERPL-SCNC: 107 MMOL/L — SIGNIFICANT CHANGE UP (ref 96–108)
CO2 SERPL-SCNC: 21 MMOL/L — LOW (ref 22–31)
CREAT SERPL-MCNC: 0.91 MG/DL — SIGNIFICANT CHANGE UP (ref 0.5–1.3)
EGFR: 101 ML/MIN/1.73M2 — SIGNIFICANT CHANGE UP
EOSINOPHIL # BLD AUTO: 0.43 K/UL — SIGNIFICANT CHANGE UP (ref 0–0.5)
EOSINOPHIL NFR BLD AUTO: 7 % — HIGH (ref 0–6)
GLUCOSE SERPL-MCNC: 80 MG/DL — SIGNIFICANT CHANGE UP (ref 70–99)
HCT VFR BLD CALC: 33.3 % — LOW (ref 39–50)
HGB BLD-MCNC: 10.7 G/DL — LOW (ref 13–17)
IMM GRANULOCYTES NFR BLD AUTO: 0.3 % — SIGNIFICANT CHANGE UP (ref 0–0.9)
LYMPHOCYTES # BLD AUTO: 1.46 K/UL — SIGNIFICANT CHANGE UP (ref 1–3.3)
LYMPHOCYTES # BLD AUTO: 23.9 % — SIGNIFICANT CHANGE UP (ref 13–44)
MAGNESIUM SERPL-MCNC: 2.4 MG/DL — SIGNIFICANT CHANGE UP (ref 1.6–2.6)
MCHC RBC-ENTMCNC: 28.7 PG — SIGNIFICANT CHANGE UP (ref 27–34)
MCHC RBC-ENTMCNC: 32.1 GM/DL — SIGNIFICANT CHANGE UP (ref 32–36)
MCV RBC AUTO: 89.3 FL — SIGNIFICANT CHANGE UP (ref 80–100)
MONOCYTES # BLD AUTO: 0.63 K/UL — SIGNIFICANT CHANGE UP (ref 0–0.9)
MONOCYTES NFR BLD AUTO: 10.3 % — SIGNIFICANT CHANGE UP (ref 2–14)
NEUTROPHILS # BLD AUTO: 3.53 K/UL — SIGNIFICANT CHANGE UP (ref 1.8–7.4)
NEUTROPHILS NFR BLD AUTO: 57.7 % — SIGNIFICANT CHANGE UP (ref 43–77)
NRBC # BLD: 0 /100 WBCS — SIGNIFICANT CHANGE UP (ref 0–0)
PHOSPHATE SERPL-MCNC: 2.9 MG/DL — SIGNIFICANT CHANGE UP (ref 2.5–4.5)
PLATELET # BLD AUTO: 165 K/UL — SIGNIFICANT CHANGE UP (ref 150–400)
POTASSIUM SERPL-MCNC: 3.8 MMOL/L — SIGNIFICANT CHANGE UP (ref 3.5–5.3)
POTASSIUM SERPL-SCNC: 3.8 MMOL/L — SIGNIFICANT CHANGE UP (ref 3.5–5.3)
PROT SERPL-MCNC: 6.1 G/DL — SIGNIFICANT CHANGE UP (ref 6–8.3)
RBC # BLD: 3.73 M/UL — LOW (ref 4.2–5.8)
RBC # FLD: 15.1 % — HIGH (ref 10.3–14.5)
SODIUM SERPL-SCNC: 141 MMOL/L — SIGNIFICANT CHANGE UP (ref 135–145)
WBC # BLD: 6.12 K/UL — SIGNIFICANT CHANGE UP (ref 3.8–10.5)
WBC # FLD AUTO: 6.12 K/UL — SIGNIFICANT CHANGE UP (ref 3.8–10.5)

## 2024-06-07 RX ORDER — POLYETHYLENE GLYCOL 3350 17 G/17G
17 POWDER, FOR SOLUTION ORAL DAILY
Refills: 0 | Status: DISCONTINUED | OUTPATIENT
Start: 2024-06-07 | End: 2024-06-11

## 2024-06-07 RX ORDER — SENNA PLUS 8.6 MG/1
2 TABLET ORAL AT BEDTIME
Refills: 0 | Status: DISCONTINUED | OUTPATIENT
Start: 2024-06-07 | End: 2024-06-11

## 2024-06-07 RX ADMIN — TAMSULOSIN HYDROCHLORIDE 0.4 MILLIGRAM(S): 0.4 CAPSULE ORAL at 21:58

## 2024-06-07 RX ADMIN — HYDROMORPHONE HYDROCHLORIDE 0.5 MILLIGRAM(S): 2 INJECTION INTRAMUSCULAR; INTRAVENOUS; SUBCUTANEOUS at 23:08

## 2024-06-07 RX ADMIN — GABAPENTIN 300 MILLIGRAM(S): 400 CAPSULE ORAL at 13:21

## 2024-06-07 RX ADMIN — Medication 650 MILLIGRAM(S): at 08:30

## 2024-06-07 RX ADMIN — HYDROMORPHONE HYDROCHLORIDE 0.5 MILLIGRAM(S): 2 INJECTION INTRAMUSCULAR; INTRAVENOUS; SUBCUTANEOUS at 22:08

## 2024-06-07 RX ADMIN — POLYETHYLENE GLYCOL 3350 17 GRAM(S): 17 POWDER, FOR SOLUTION ORAL at 13:21

## 2024-06-07 RX ADMIN — GABAPENTIN 300 MILLIGRAM(S): 400 CAPSULE ORAL at 04:57

## 2024-06-07 RX ADMIN — GABAPENTIN 300 MILLIGRAM(S): 400 CAPSULE ORAL at 21:58

## 2024-06-07 RX ADMIN — LACOSAMIDE 150 MILLIGRAM(S): 50 TABLET ORAL at 04:57

## 2024-06-07 RX ADMIN — HYDROMORPHONE HYDROCHLORIDE 0.5 MILLIGRAM(S): 2 INJECTION INTRAMUSCULAR; INTRAVENOUS; SUBCUTANEOUS at 09:47

## 2024-06-07 RX ADMIN — Medication 650 MILLIGRAM(S): at 08:00

## 2024-06-07 RX ADMIN — PANTOPRAZOLE SODIUM 40 MILLIGRAM(S): 20 TABLET, DELAYED RELEASE ORAL at 04:57

## 2024-06-07 RX ADMIN — ATORVASTATIN CALCIUM 20 MILLIGRAM(S): 80 TABLET, FILM COATED ORAL at 21:58

## 2024-06-07 RX ADMIN — ENOXAPARIN SODIUM 40 MILLIGRAM(S): 100 INJECTION SUBCUTANEOUS at 17:08

## 2024-06-07 RX ADMIN — HYDROMORPHONE HYDROCHLORIDE 0.5 MILLIGRAM(S): 2 INJECTION INTRAMUSCULAR; INTRAVENOUS; SUBCUTANEOUS at 09:17

## 2024-06-07 RX ADMIN — SENNA PLUS 2 TABLET(S): 8.6 TABLET ORAL at 22:33

## 2024-06-07 RX ADMIN — LACOSAMIDE 150 MILLIGRAM(S): 50 TABLET ORAL at 17:07

## 2024-06-07 NOTE — DISCHARGE NOTE PROVIDER - PROVIDER TOKENS
PROVIDER:[TOKEN:[[id number]]] PROVIDER:[TOKEN:[[id number]]],PROVIDER:[TOKEN:[[id number]]] PROVIDER:[TOKEN:[[id number]]],PROVIDER:[TOKEN:[[id number]]],PROVIDER:[TOKEN:[[id number]]],PROVIDER:[TOKEN:[[id number]]]

## 2024-06-07 NOTE — DISCHARGE NOTE PROVIDER - CARE PROVIDER_API CALL
Rodolfo Johnson  Internal Medicine  [address] [facility], [address] [place] [address]  Phone: [phone]  Fax: [phone]  Follow Up Time:    Rodolfo Johnson  Internal Medicine  [address] [address]  Phone: [phone]  Fax: [phone]  Follow Up Time:     Fito Varela  Internal Medicine  [address] [place] [address]  Phone: [phone]  Fax: [phone]  Follow Up Time:    Rodolfo Johnson  Internal Medicine  [address] [address]  Phone: [phone]  Fax: [phone]  Follow Up Time:     Fito Varela  Internal Medicine  [address] [place] [address]  Phone: [phone]  Fax: [phone]  Follow Up Time:     Maikel Stinson  Vascular Surgery  [address] [facility], [address]  [place] [address]  Phone: [phone]  Fax: [phone]  Follow Up Time:     Mamadou Gudino  Plastic Surgery  [address] [facility] [address]  [place] [address]  Phone: [phone]  Fax: [phone]  Follow Up Time:

## 2024-06-07 NOTE — DISCHARGE NOTE PROVIDER - NSDCFUSCHEDAPPT_GEN_ALL_CORE_FT
[facility]  UROLOGY [address]  Scheduled Appointment: 07/01/2024    Doni Walker  [facility]  UROLOGY [address]  Scheduled Appointment: 07/01/2024    Mariah Christie  [facility]  VASCULAR [address]  Scheduled Appointment: 07/17/2024

## 2024-06-07 NOTE — PROGRESS NOTE ADULT - ASSESSMENT
53M admitted for hematoma on the left forearm.       Traumatic hematoma of left upper arm.   - S/P Mechanical fall (Walker got caught on sidewalk)  - CT Angio w/ 9.1 x 4.1 cm hematoma in soft tissues of   the posterior forearm with scattered punctate hyperdense foci, concern for contrast extravasation, Left forearm soft tissue hematoma with small foci of contrast extravasation.  - CT H without acute findings   - X-rays neg for acute fractures or dislocations  - CK down-trended to WNL   - Monitor for compartment syndrome   - Neurovascular checks   - Per Hand surgery, c/w elevation of forearm on pillows and blankets keeping elbow at 90 degrees  - Hand surgery clearance for AC appreciated. Home Lovenox 40 Qd resumed. Monitor   - Vascular surgery and Hand Surgery evals appreciated; F/u recs     H/O DVT, Anemia   - Pt was on Lovenox full dose for AC. Later changed to Lovenox 40 Qd  - LE Duplex negative   - Check serial CBC BID; Monitor H/H   - Heme/Onc eval appreciated: F/u recs --> AC resumed, trend CBC BID, cleared by surgery     Seizure disorder.   - EEG w/ mild slowing, neg for seizures-  AED per neuro  - On Lacosamide  - Seizure precautions  - Neuro eval appreciated; F/u recs    History of CVA (cerebrovascular accident).   - C/w home ASA and Lipitor  - Episode of reported "confusion" on 6/4 PM. EEG w/ mild slowing, neg for seizures AED per neuro      Encounter for postoperative wound care.   - Pt with left groin wound vac from previous fasciotomy   - Wound vac ran out of battery 6/4 AM. Vascular surgery team notified --> Wound vac per vascular / PT wound care    - PT Wound care eval, wound vac in place     Chronic pain.   - On gabapentin.  - Reports he had not taken oxycodone or tramadol in a while.    BPH  - C/w Flomax    Medication management.  - Have asked pharmacy to obtain med rec    Prophylactic measure.   - VTE ppx: Lovenox resumed   - PPI

## 2024-06-07 NOTE — DISCHARGE NOTE PROVIDER - NSDCCPCAREPLAN_GEN_ALL_CORE_FT
PRINCIPAL DISCHARGE DIAGNOSIS  Diagnosis: Hematoma of left forearm  Assessment and Plan of Treatment: CTA of L UE with hematoma. No evidence of acute compartment syndrome as per hand surgery. Continue to elevate arm.      SECONDARY DISCHARGE DIAGNOSES  Diagnosis: Seizure disorder  Assessment and Plan of Treatment: EEG with mild slowing. Negative for seizure. Seen by neurology. Continue anti epileptic medications.    Diagnosis: Falls  Assessment and Plan of Treatment: CT head negative. all xrays with out fracture. Fall precautions.     PRINCIPAL DISCHARGE DIAGNOSIS  Diagnosis: Hematoma of left forearm  Assessment and Plan of Treatment: CTA of L UE with hematoma. No evidence of acute compartment syndrome as per hand surgery. Continue to elevate arm.      SECONDARY DISCHARGE DIAGNOSES  Diagnosis: Seizure disorder  Assessment and Plan of Treatment: EEG with mild slowing. Negative for seizure. Seen by neurology. Continue anti epileptic medications.    Diagnosis: Falls  Assessment and Plan of Treatment: CT head negative. all xrays with out fracture. Fall precautions.    Diagnosis: DVT, lower extremity  Assessment and Plan of Treatment: History of DVT. Doppler negative for DVT. Continue current dose of Lovenox. Follow up with Dr Ignacio     PRINCIPAL DISCHARGE DIAGNOSIS  Diagnosis: Hematoma of left forearm  Assessment and Plan of Treatment: CTA of L UE with hematoma. No evidence of acute compartment syndrome as per hand surgery. Continue to elevate arm. Resume sc lovenox and low dose aspirin, f/up vas, hand surg and neurol      SECONDARY DISCHARGE DIAGNOSES  Diagnosis: Falls  Assessment and Plan of Treatment: CT head negative. all xrays with out fracture. Fall precautions.    Diagnosis: Seizure disorder  Assessment and Plan of Treatment: EEG with mild slowing. Negative for seizure. Seen by neurology. Continue anti epileptic medications.    Diagnosis: DVT, lower extremity  Assessment and Plan of Treatment: History of DVT. Doppler negative for DVT. Continue current dose of Lovenox and aspirin. Follow up with Dr Ignacio

## 2024-06-07 NOTE — DISCHARGE NOTE PROVIDER - CARE PROVIDERS DIRECT ADDRESSES
,DirectAddress_Unknown ,DirectAddress_Unknown,[email or web address] ,DirectAddress_Unknown,[email or web address]

## 2024-06-07 NOTE — PROGRESS NOTE ADULT - ASSESSMENT
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 Poor opacification of the arteries in the distal   forearm and hand. There is a 9.1 x 4.1 cm hematoma in the soft tissues of   the posterior forearm with scattered punctate hyperdense foci (304, 118,  119 and 122), suspicious for contrast extravasation.  EEG neg but mild slowing     Impression:   1) chronic strokes, resid minimal WFD  2) seizure d/o 2/2 strokes  3) ADHD  4) vertigo BPPV, stable   5) chornic L thigh hematoma   6) LLE compartment syndrome   7) new Left forearm hematoma   8) fall      -  eeg results;  neg for seizures; mild slowing ; will consider lowering vimpat to 100 BID   - f/u vasc and vac team ; wound vac   -  was on ASA 81mg daily and lovenox 40mg    ; resume when able ; Lovenox 40 started   - monitor H/H.      - heme/onc recs appreicated   - monitor for compartment syndrome   - Lovenox full dose now held.  was injecting in thigh at site of hematoma ; previously was on full dose then charged to 40mg SQ   - for ADHD gets Vyvanse 50mg daily  - for seizure he is on vimpat 150 mg BID  - statin therapy with LDL goal < 70mg/dL; atorvastatin 20 at home   - meclizline PRN   - PT/OT   - check FS, glucose control <180  - GI/DVT ppx   - Thank you for allowing me to participate in the care of this patient. Call with questions.      Braxton Forde MD  Vascular Neurology  Office: [phone]

## 2024-06-07 NOTE — DISCHARGE NOTE PROVIDER - HOSPITAL COURSE
HPI:  53y with hx of CVA w/ residual expressive aphasia, h/o b/l DVTs, seizure disorder, and vertigo. Pt recently had compartment syndrome s/p fasciotomy of the LLE, went to rehab and dc'd home. Pt comes to [facility] ED today s/p fall while walking w/ walker, left forearm pain. In the ED, afebrile, was tachy to 103 but came down to 80s since, BP stable, and on RA.   CBC w/ hgb 13.1, wbc 8, plt 227 ,Coag wnl. CMP w/ SCr 1.06, LFT wnl. . XR left elbow, left forearm, CXR and pelvis neg for acute fx. CTH non con negative.   CTA LUE w/ soft tissue hematoma with small foci of contrast extravasation. Pt seen by vascualr and hand surgery in the ED for c/f compartment syndrome on LUE.   Hand surgery Dr. Gudino recommends elevating the elbow and monitor overnight (to be done by orthopedics residents overnight).  (04 Jun 2024 01:02)    Hospital Course:      Important Medication Changes and Reason:    Active or Pending Issues Requiring Follow-up:    Advanced Directives:   [ ] Full code  [ ] DNR  [ ] Hospice    Discharge Diagnoses:         HPI:  53y with hx of CVA w/ residual expressive aphasia, h/o b/l DVTs, seizure disorder, and vertigo. Pt recently had compartment syndrome s/p fasciotomy of the LLE, went to rehab and dc'd home. Pt comes to [facility] ED today s/p fall while walking w/ walker, left forearm pain. In the ED, afebrile, was tachy to 103 but came down to 80s since, BP stable, and on RA.   CBC w/ hgb 13.1, wbc 8, plt 227 ,Coag wnl. CMP w/ SCr 1.06, LFT wnl. . XR left elbow, left forearm, CXR and pelvis neg for acute fx. CTH non con negative.   CTA LUE w/ soft tissue hematoma with small foci of contrast extravasation. Pt seen by vascualr and hand surgery in the ED for c/f compartment syndrome on LUE.   Hand surgery Dr. Gudino recommends elevating the elbow and monitor overnight (to be done by orthopedics residents overnight).  (04 Jun 2024 01:02)    Hospital Course:  CTA LUE with soft tissue hematoma with small foci of contrast extravasation. As per hand Sx and vascular no evidence of acute compartmental syndrome. To elevate arm.   No evidence of fracture on images(see above) H/O DVT, Anemia . Pt was on Lovenox full dose for AC. Later changed to Lovenox 40 Qd. LE Duplex negative. Heme/Onc eval appreciated.  AC resumed. Hb stable. Hx Sezure disorder. Episode of reported confusion on 6/4 PM. Neuro consulted. EEG w/ mild slowing, neg for seizures. On Lacosamide.  Patient with left groin wound vac from previous fasciotomy.  Wound vac ran out of battery 6/4 AM. Vascular surgery team notified . Wound vac resumed.     Important Medication Changes and Reason:    Active or Pending Issues Requiring Follow-up:  Vascular  Advanced Directives:   [ x] Full code  [ ] DNR  [ ] Hospice    Discharge Diagnoses:  S/P fall  LUE hematoma  L groin wound vac         HPI:  53y with hx of CVA w/ residual expressive aphasia, h/o b/l DVTs, seizure disorder, and vertigo. Pt recently had compartment syndrome s/p fasciotomy of the LLE, went to rehab and dc'd home. Pt comes to [facility] ED today s/p fall while walking w/ walker, left forearm pain. In the ED, afebrile, was tachy to 103 but came down to 80s since, BP stable, and on RA.   CBC w/ hgb 13.1, wbc 8, plt 227 ,Coag wnl. CMP w/ SCr 1.06, LFT wnl. . XR left elbow, left forearm, CXR and pelvis neg for acute fx. CTH non con negative.   CTA LUE w/ soft tissue hematoma with small foci of contrast extravasation. Pt seen by vascualr and hand surgery in the ED for c/f compartment syndrome on LUE.   Hand surgery Dr. Gudino recommends elevating the elbow and monitor overnight (to be done by orthopedics residents overnight).  (04 Jun 2024 01:02)    Hospital Course:  CTA LUE with soft tissue hematoma with small foci of contrast extravasation. As per hand Sx and vascular no evidence of acute compartmental syndrome. To elevate arm.   No evidence of fracture on images(see above) H/O DVT, Anemia . Pt was on Lovenox full dose for AC. Later changed to Lovenox 40 Qd. LE Duplex negative. Heme/Onc eval appreciated.  AC resumed. Hb stable. Hx Sezure disorder. Episode of reported confusion on 6/4 PM. Neuro consulted. EEG w/ mild slowing, neg for seizures. On Lacosamide.  Patient with left groin wound vac from previous fasciotomy.  Wound vac ran out of battery 6/4 AM. Vascular surgery team notified . Wound vac resumed.     Important Medication Changes and Reason: Lovenox dose decreased     Active or Pending Issues Requiring Follow-up: Dr Johnson  Vascular  Advanced Directives:   [ x] Full code  [ ] DNR  [ ] Hospice    Discharge Diagnoses:  S/P fall  LUE hematoma  L groin wound vac         HPI:  53y with hx of CVA w/ residual expressive aphasia, h/o b/l DVTs, seizure disorder, and vertigo. Pt recently had compartment syndrome s/p fasciotomy of the LLE, went to rehab and dc'd home. Pt comes to [facility] ED today s/p fall while walking w/ walker, left forearm pain. In the ED, afebrile, was tachy to 103 but came down to 80s since, BP stable, and on RA.   CBC w/ hgb 13.1, wbc 8, plt 227 ,Coag wnl. CMP w/ SCr 1.06, LFT wnl. . XR left elbow, left forearm, CXR and pelvis neg for acute fx. CTH non con negative.   CTA LUE w/ soft tissue hematoma with small foci of contrast extravasation. Pt seen by vascualr and hand surgery in the ED for c/f compartment syndrome on LUE.   Hand surgery Dr. Gudino recommends elevating the elbow and monitor overnight (to be done by orthopedics residents overnight).  (04 Jun 2024 01:02)    Hospital Course:  CTA LUE with soft tissue hematoma with small foci of contrast extravasation. As per hand Sx and vascular no evidence of acute compartmental syndrome. To elevate arm.   No evidence of fracture on images(see above) H/O DVT, Anemia . Pt was on Lovenox full dose for AC. Later changed to Lovenox 40 Qd. LE Duplex negative. Heme/Onc eval appreciated.  AC resumed. Hb stable. Hx Sezure disorder. Episode of reported confusion on 6/4 PM. Neuro consulted. EEG w/ mild slowing, neg for seizures. On Lacosamide.  Patient with left groin wound vac from previous fasciotomy.  Wound vac ran out of battery 6/4 AM. Vascular surgery team notified . Wound vac resumed.     Important Medication Changes and Reason: Lovenox dose decreased and low dose Aspirin was resumed by Neurol. He was seen by Vas, Hand Surg, Neurol, Hm/Onc and PT. He will discharged home today with DMEs, spoke to Attending, CM.     Active or Pending Issues Requiring Follow-up: Dr Johnson  Vascular  Advanced Directives:   [ x] Full code  [ ] DNR  [ ] Hospice    Discharge Diagnoses:  S/P fall  LUE hematoma  L groin wound vac

## 2024-06-08 RX ADMIN — GABAPENTIN 300 MILLIGRAM(S): 400 CAPSULE ORAL at 14:57

## 2024-06-08 RX ADMIN — ENOXAPARIN SODIUM 40 MILLIGRAM(S): 100 INJECTION SUBCUTANEOUS at 18:48

## 2024-06-08 RX ADMIN — ATORVASTATIN CALCIUM 20 MILLIGRAM(S): 80 TABLET, FILM COATED ORAL at 22:06

## 2024-06-08 RX ADMIN — HYDROMORPHONE HYDROCHLORIDE 0.5 MILLIGRAM(S): 2 INJECTION INTRAMUSCULAR; INTRAVENOUS; SUBCUTANEOUS at 20:38

## 2024-06-08 RX ADMIN — Medication 650 MILLIGRAM(S): at 11:11

## 2024-06-08 RX ADMIN — SENNA PLUS 2 TABLET(S): 8.6 TABLET ORAL at 22:05

## 2024-06-08 RX ADMIN — Medication 650 MILLIGRAM(S): at 18:49

## 2024-06-08 RX ADMIN — POLYETHYLENE GLYCOL 3350 17 GRAM(S): 17 POWDER, FOR SOLUTION ORAL at 12:46

## 2024-06-08 RX ADMIN — GABAPENTIN 300 MILLIGRAM(S): 400 CAPSULE ORAL at 22:05

## 2024-06-08 RX ADMIN — TAMSULOSIN HYDROCHLORIDE 0.4 MILLIGRAM(S): 0.4 CAPSULE ORAL at 22:05

## 2024-06-08 RX ADMIN — Medication 650 MILLIGRAM(S): at 10:11

## 2024-06-08 RX ADMIN — LACOSAMIDE 150 MILLIGRAM(S): 50 TABLET ORAL at 18:48

## 2024-06-08 RX ADMIN — LACOSAMIDE 150 MILLIGRAM(S): 50 TABLET ORAL at 05:40

## 2024-06-08 RX ADMIN — Medication 650 MILLIGRAM(S): at 19:49

## 2024-06-08 RX ADMIN — GABAPENTIN 300 MILLIGRAM(S): 400 CAPSULE ORAL at 05:40

## 2024-06-08 RX ADMIN — HYDROMORPHONE HYDROCHLORIDE 0.5 MILLIGRAM(S): 2 INJECTION INTRAMUSCULAR; INTRAVENOUS; SUBCUTANEOUS at 21:38

## 2024-06-08 RX ADMIN — PANTOPRAZOLE SODIUM 40 MILLIGRAM(S): 20 TABLET, DELAYED RELEASE ORAL at 06:53

## 2024-06-09 RX ORDER — ASPIRIN/CALCIUM CARB/MAGNESIUM 324 MG
81 TABLET ORAL DAILY
Refills: 0 | Status: DISCONTINUED | OUTPATIENT
Start: 2024-06-10 | End: 2024-06-11

## 2024-06-09 RX ADMIN — PANTOPRAZOLE SODIUM 40 MILLIGRAM(S): 20 TABLET, DELAYED RELEASE ORAL at 08:10

## 2024-06-09 RX ADMIN — GABAPENTIN 300 MILLIGRAM(S): 400 CAPSULE ORAL at 14:32

## 2024-06-09 RX ADMIN — POLYETHYLENE GLYCOL 3350 17 GRAM(S): 17 POWDER, FOR SOLUTION ORAL at 12:28

## 2024-06-09 RX ADMIN — TAMSULOSIN HYDROCHLORIDE 0.4 MILLIGRAM(S): 0.4 CAPSULE ORAL at 21:45

## 2024-06-09 RX ADMIN — HYDROMORPHONE HYDROCHLORIDE 0.5 MILLIGRAM(S): 2 INJECTION INTRAMUSCULAR; INTRAVENOUS; SUBCUTANEOUS at 22:47

## 2024-06-09 RX ADMIN — SENNA PLUS 2 TABLET(S): 8.6 TABLET ORAL at 21:45

## 2024-06-09 RX ADMIN — GABAPENTIN 300 MILLIGRAM(S): 400 CAPSULE ORAL at 21:45

## 2024-06-09 RX ADMIN — HYDROMORPHONE HYDROCHLORIDE 0.5 MILLIGRAM(S): 2 INJECTION INTRAMUSCULAR; INTRAVENOUS; SUBCUTANEOUS at 09:11

## 2024-06-09 RX ADMIN — LACOSAMIDE 150 MILLIGRAM(S): 50 TABLET ORAL at 17:24

## 2024-06-09 RX ADMIN — HYDROMORPHONE HYDROCHLORIDE 0.5 MILLIGRAM(S): 2 INJECTION INTRAMUSCULAR; INTRAVENOUS; SUBCUTANEOUS at 23:10

## 2024-06-09 RX ADMIN — HYDROMORPHONE HYDROCHLORIDE 0.5 MILLIGRAM(S): 2 INJECTION INTRAMUSCULAR; INTRAVENOUS; SUBCUTANEOUS at 08:11

## 2024-06-09 RX ADMIN — ENOXAPARIN SODIUM 40 MILLIGRAM(S): 100 INJECTION SUBCUTANEOUS at 17:23

## 2024-06-09 RX ADMIN — GABAPENTIN 300 MILLIGRAM(S): 400 CAPSULE ORAL at 05:55

## 2024-06-09 RX ADMIN — ATORVASTATIN CALCIUM 20 MILLIGRAM(S): 80 TABLET, FILM COATED ORAL at 21:45

## 2024-06-09 RX ADMIN — LACOSAMIDE 150 MILLIGRAM(S): 50 TABLET ORAL at 05:55

## 2024-06-09 NOTE — PROGRESS NOTE ADULT - ASSESSMENT
53M admitted for hematoma on the left forearm.       Traumatic hematoma of left upper arm.   - S/P Mechanical fall (Walker got caught on sidewalk)  - CT Angio w/ 9.1 x 4.1 cm hematoma in soft tissues of   the posterior forearm with scattered punctate hyperdense foci, concern for contrast extravasation, Left forearm soft tissue hematoma with small foci of contrast extravasation.  - CT H without acute findings   - X-rays neg for acute fractures or dislocations  - CK down-trended to WNL   - Monitor for compartment syndrome   - Neurovascular checks   - Per Hand surgery, c/w elevation of forearm on pillows and blankets keeping elbow at 90 degrees  - Hand surgery clearance for AC appreciated. Home Lovenox 40 Qd resumed. Monitor   - Vascular surgery and Hand Surgery evals appreciated; F/u recs     H/O DVT, Anemia   - Pt was on Lovenox full dose for AC. Later changed to Lovenox 40 Qd  - LE Duplex negative   - Check serial CBC BID; Monitor H/H   - Heme/Onc eval appreciated: F/u recs --> AC resumed, trend CBC BID, cleared by surgery     Seizure disorder.   - EEG w/ mild slowing, neg for seizures-  AED per neuro  - On Lacosamide  - Seizure precautions  - Neuro eval appreciated; F/u recs    History of CVA (cerebrovascular accident).   - ResumeASA andcont lipitor   - Episode of reported "confusion" on 6/4 PM. EEG w/ mild slowing, neg for seizures AED per neuro      Encounter for postoperative wound care.   - Pt with left groin wound vac from previous fasciotomy   - Wound vac ran out of battery 6/4 AM. Vascular surgery team notified --> Wound vac per vascular / PT wound care    - PT Wound care eval, wound vac in place     Chronic pain.   - On gabapentin.  - Reports he had not taken oxycodone or tramadol in a while.    BPH  - C/w Flomax    Medication management.  - Have asked pharmacy to obtain med rec    Prophylactic measure.   - VTE ppx: Lovenox resumed   - PPI

## 2024-06-09 NOTE — PROGRESS NOTE ADULT - ASSESSMENT
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 Poor opacification of the arteries in the distal   forearm and hand. There is a 9.1 x 4.1 cm hematoma in the soft tissues of   the posterior forearm with scattered punctate hyperdense foci (304, 118,  119 and 122), suspicious for contrast extravasation.  EEG neg but mild slowing     Impression:   1) chronic strokes, resid minimal WFD  2) seizure d/o 2/2 strokes  3) ADHD  4) vertigo BPPV, stable   5) chornic L thigh hematoma   6) LLE compartment syndrome   7) new Left forearm hematoma   8) fall      -  eeg results;  neg for seizures; mild slowing ; will consider lowering vimpat to 100 BID   - f/u vasc and vac team ; wound vac   -  was on ASA 81mg daily and lovenox 40mg    ; resume when able ; now only on lovenox 40   - monitor H/H.      - heme/onc recs appreicated   - monitor for compartment syndrome   - Lovenox full dose now held.  was injecting in thigh at site of hematoma ; previously was on full dose then charged to 40mg SQ   - for ADHD gets Vyvanse 50mg daily  - for seizure he is on vimpat 150 mg BID  - statin therapy with LDL goal < 70mg/dL; atorvastatin 20 at home   - meclizline PRN   - PT/OT   - check FS, glucose control <180  - GI/DVT ppx   - Thank you for allowing me to participate in the care of this patient. Call with questions.      Braxton Forde MD  Vascular Neurology  Office: [phone]

## 2024-06-10 ENCOUNTER — TRANSCRIPTION ENCOUNTER (OUTPATIENT)
Age: 54
End: 2024-06-10

## 2024-06-10 RX ORDER — CHLORHEXIDINE GLUCONATE 213 G/1000ML
1 SOLUTION TOPICAL DAILY
Refills: 0 | Status: DISCONTINUED | OUTPATIENT
Start: 2024-06-10 | End: 2024-06-11

## 2024-06-10 RX ADMIN — HYDROMORPHONE HYDROCHLORIDE 0.5 MILLIGRAM(S): 2 INJECTION INTRAMUSCULAR; INTRAVENOUS; SUBCUTANEOUS at 21:49

## 2024-06-10 RX ADMIN — LACOSAMIDE 150 MILLIGRAM(S): 50 TABLET ORAL at 05:47

## 2024-06-10 RX ADMIN — SENNA PLUS 2 TABLET(S): 8.6 TABLET ORAL at 20:56

## 2024-06-10 RX ADMIN — GABAPENTIN 300 MILLIGRAM(S): 400 CAPSULE ORAL at 05:47

## 2024-06-10 RX ADMIN — POLYETHYLENE GLYCOL 3350 17 GRAM(S): 17 POWDER, FOR SOLUTION ORAL at 12:22

## 2024-06-10 RX ADMIN — TAMSULOSIN HYDROCHLORIDE 0.4 MILLIGRAM(S): 0.4 CAPSULE ORAL at 20:56

## 2024-06-10 RX ADMIN — CHLORHEXIDINE GLUCONATE 1 APPLICATION(S): 213 SOLUTION TOPICAL at 11:46

## 2024-06-10 RX ADMIN — Medication 81 MILLIGRAM(S): at 11:46

## 2024-06-10 RX ADMIN — HYDROMORPHONE HYDROCHLORIDE 0.5 MILLIGRAM(S): 2 INJECTION INTRAMUSCULAR; INTRAVENOUS; SUBCUTANEOUS at 22:04

## 2024-06-10 RX ADMIN — PANTOPRAZOLE SODIUM 40 MILLIGRAM(S): 20 TABLET, DELAYED RELEASE ORAL at 05:47

## 2024-06-10 RX ADMIN — LACOSAMIDE 150 MILLIGRAM(S): 50 TABLET ORAL at 18:02

## 2024-06-10 RX ADMIN — GABAPENTIN 300 MILLIGRAM(S): 400 CAPSULE ORAL at 13:59

## 2024-06-10 RX ADMIN — GABAPENTIN 300 MILLIGRAM(S): 400 CAPSULE ORAL at 20:56

## 2024-06-10 RX ADMIN — ENOXAPARIN SODIUM 40 MILLIGRAM(S): 100 INJECTION SUBCUTANEOUS at 18:02

## 2024-06-10 RX ADMIN — ATORVASTATIN CALCIUM 20 MILLIGRAM(S): 80 TABLET, FILM COATED ORAL at 20:56

## 2024-06-10 NOTE — DISCHARGE NOTE NURSING/CASE MANAGEMENT/SOCIAL WORK - NSDCPEFALRISK_GEN_ALL_CORE
For information on Fall & Injury Prevention, visit: [email or web address] OR  [email or web address] OR  https://www.cdc.gov/steadi/patient.html

## 2024-06-10 NOTE — PROGRESS NOTE ADULT - ASSESSMENT
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 Poor opacification of the arteries in the distal   forearm and hand. There is a 9.1 x 4.1 cm hematoma in the soft tissues of   the posterior forearm with scattered punctate hyperdense foci (304, 118,  119 and 122), suspicious for contrast extravasation.  EEG neg but mild slowing     Impression:   1) chronic strokes, resid minimal WFD  2) seizure d/o 2/2 strokes  3) ADHD  4) vertigo BPPV, stable   5) chornic L thigh hematoma   6) LLE compartment syndrome   7) new Left forearm hematoma   8) fall      - consider starting ASA today prior to discharge and monitor 24hrs   - need to wean off dilauded for idshcarge   -  eeg results;  neg for seizures; mild slowing ; will consider lowering vimpat to 100 BID   - f/u vasc and vac team ; wound vac   -  was on ASA 81mg daily and lovenox 40mg    ; resume when able ; now only on lovenox 40   - monitor H/H.      - heme/onc recs appreicated   - monitor for compartment syndrome   - Lovenox full dose now held.  was injecting in thigh at site of hematoma ; previously was on full dose then charged to 40mg SQ   - for ADHD gets Vyvanse 50mg daily  - for seizure he is on vimpat 150 mg BID  - statin therapy with LDL goal < 70mg/dL; atorvastatin 20 at home   - meclizline PRN   - PT/OT   - check FS, glucose control <180  - GI/DVT ppx   - Thank you for allowing me to participate in the care of this patient. Call with questions.      Braxton Forde MD  Vascular Neurology  Office: [phone]

## 2024-06-11 VITALS
TEMPERATURE: 98 F | DIASTOLIC BLOOD PRESSURE: 80 MMHG | OXYGEN SATURATION: 97 % | RESPIRATION RATE: 18 BRPM | SYSTOLIC BLOOD PRESSURE: 128 MMHG | HEART RATE: 81 BPM

## 2024-06-11 LAB
ANION GAP SERPL CALC-SCNC: 12 MMOL/L — SIGNIFICANT CHANGE UP (ref 5–17)
BUN SERPL-MCNC: 14 MG/DL — SIGNIFICANT CHANGE UP (ref 7–23)
CALCIUM SERPL-MCNC: 9.2 MG/DL — SIGNIFICANT CHANGE UP (ref 8.4–10.5)
CHLORIDE SERPL-SCNC: 106 MMOL/L — SIGNIFICANT CHANGE UP (ref 96–108)
CO2 SERPL-SCNC: 24 MMOL/L — SIGNIFICANT CHANGE UP (ref 22–31)
CREAT SERPL-MCNC: 0.89 MG/DL — SIGNIFICANT CHANGE UP (ref 0.5–1.3)
EGFR: 102 ML/MIN/1.73M2 — SIGNIFICANT CHANGE UP
GLUCOSE SERPL-MCNC: 94 MG/DL — SIGNIFICANT CHANGE UP (ref 70–99)
HCT VFR BLD CALC: 35.1 % — LOW (ref 39–50)
HGB BLD-MCNC: 11.5 G/DL — LOW (ref 13–17)
MCHC RBC-ENTMCNC: 28.5 PG — SIGNIFICANT CHANGE UP (ref 27–34)
MCHC RBC-ENTMCNC: 32.8 GM/DL — SIGNIFICANT CHANGE UP (ref 32–36)
MCV RBC AUTO: 87.1 FL — SIGNIFICANT CHANGE UP (ref 80–100)
NRBC # BLD: 0 /100 WBCS — SIGNIFICANT CHANGE UP (ref 0–0)
PLATELET # BLD AUTO: 165 K/UL — SIGNIFICANT CHANGE UP (ref 150–400)
POTASSIUM SERPL-MCNC: 4.1 MMOL/L — SIGNIFICANT CHANGE UP (ref 3.5–5.3)
POTASSIUM SERPL-SCNC: 4.1 MMOL/L — SIGNIFICANT CHANGE UP (ref 3.5–5.3)
RBC # BLD: 4.03 M/UL — LOW (ref 4.2–5.8)
RBC # FLD: 14.6 % — HIGH (ref 10.3–14.5)
SODIUM SERPL-SCNC: 142 MMOL/L — SIGNIFICANT CHANGE UP (ref 135–145)
WBC # BLD: 5.53 K/UL — SIGNIFICANT CHANGE UP (ref 3.8–10.5)
WBC # FLD AUTO: 5.53 K/UL — SIGNIFICANT CHANGE UP (ref 3.8–10.5)

## 2024-06-11 PROCEDURE — 97162 PT EVAL MOD COMPLEX 30 MIN: CPT

## 2024-06-11 PROCEDURE — 80048 BASIC METABOLIC PNL TOTAL CA: CPT

## 2024-06-11 PROCEDURE — 96374 THER/PROPH/DIAG INJ IV PUSH: CPT

## 2024-06-11 PROCEDURE — 85610 PROTHROMBIN TIME: CPT

## 2024-06-11 PROCEDURE — 84295 ASSAY OF SERUM SODIUM: CPT

## 2024-06-11 PROCEDURE — 82435 ASSAY OF BLOOD CHLORIDE: CPT

## 2024-06-11 PROCEDURE — 82947 ASSAY GLUCOSE BLOOD QUANT: CPT

## 2024-06-11 PROCEDURE — 73070 X-RAY EXAM OF ELBOW: CPT

## 2024-06-11 PROCEDURE — 84132 ASSAY OF SERUM POTASSIUM: CPT

## 2024-06-11 PROCEDURE — 83605 ASSAY OF LACTIC ACID: CPT

## 2024-06-11 PROCEDURE — 86900 BLOOD TYPING SEROLOGIC ABO: CPT

## 2024-06-11 PROCEDURE — 85025 COMPLETE CBC W/AUTO DIFF WBC: CPT

## 2024-06-11 PROCEDURE — 97605 NEG PRS WND THER DME<=50SQCM: CPT

## 2024-06-11 PROCEDURE — 93971 EXTREMITY STUDY: CPT

## 2024-06-11 PROCEDURE — 80053 COMPREHEN METABOLIC PANEL: CPT

## 2024-06-11 PROCEDURE — 85027 COMPLETE CBC AUTOMATED: CPT

## 2024-06-11 PROCEDURE — 82330 ASSAY OF CALCIUM: CPT

## 2024-06-11 PROCEDURE — 85730 THROMBOPLASTIN TIME PARTIAL: CPT

## 2024-06-11 PROCEDURE — 82550 ASSAY OF CK (CPK): CPT

## 2024-06-11 PROCEDURE — 86850 RBC ANTIBODY SCREEN: CPT

## 2024-06-11 PROCEDURE — 70450 CT HEAD/BRAIN W/O DYE: CPT | Mod: MC

## 2024-06-11 PROCEDURE — 72170 X-RAY EXAM OF PELVIS: CPT

## 2024-06-11 PROCEDURE — 73206 CT ANGIO UPR EXTRM W/O&W/DYE: CPT | Mod: MC

## 2024-06-11 PROCEDURE — 85014 HEMATOCRIT: CPT

## 2024-06-11 PROCEDURE — 95816 EEG AWAKE AND DROWSY: CPT

## 2024-06-11 PROCEDURE — 73090 X-RAY EXAM OF FOREARM: CPT

## 2024-06-11 PROCEDURE — 83735 ASSAY OF MAGNESIUM: CPT

## 2024-06-11 PROCEDURE — 71045 X-RAY EXAM CHEST 1 VIEW: CPT

## 2024-06-11 PROCEDURE — 99285 EMERGENCY DEPT VISIT HI MDM: CPT | Mod: 25

## 2024-06-11 PROCEDURE — 97530 THERAPEUTIC ACTIVITIES: CPT

## 2024-06-11 PROCEDURE — 84100 ASSAY OF PHOSPHORUS: CPT

## 2024-06-11 PROCEDURE — 36415 COLL VENOUS BLD VENIPUNCTURE: CPT

## 2024-06-11 PROCEDURE — 85018 HEMOGLOBIN: CPT

## 2024-06-11 PROCEDURE — 86901 BLOOD TYPING SEROLOGIC RH(D): CPT

## 2024-06-11 PROCEDURE — 82803 BLOOD GASES ANY COMBINATION: CPT

## 2024-06-11 PROCEDURE — 97116 GAIT TRAINING THERAPY: CPT

## 2024-06-11 RX ORDER — LANOLIN ALCOHOL/MO/W.PET/CERES
1 CREAM (GRAM) TOPICAL
Qty: 0 | Refills: 0 | DISCHARGE
Start: 2024-06-11

## 2024-06-11 RX ORDER — LISDEXAMFETAMINE DIMESYLATE 70 MG/1
1 CAPSULE ORAL
Refills: 0 | DISCHARGE

## 2024-06-11 RX ORDER — TAMSULOSIN HYDROCHLORIDE 0.4 MG/1
1 CAPSULE ORAL
Qty: 0 | Refills: 0 | DISCHARGE
Start: 2024-06-11

## 2024-06-11 RX ORDER — PANTOPRAZOLE SODIUM 20 MG/1
1 TABLET, DELAYED RELEASE ORAL
Qty: 30 | Refills: 0
Start: 2024-06-11 | End: 2024-07-10

## 2024-06-11 RX ORDER — ASPIRIN/CALCIUM CARB/MAGNESIUM 324 MG
1 TABLET ORAL
Qty: 0 | Refills: 0 | DISCHARGE
Start: 2024-06-11

## 2024-06-11 RX ORDER — OXYCODONE HYDROCHLORIDE 5 MG/1
1 TABLET ORAL
Qty: 12 | Refills: 0
Start: 2024-06-11 | End: 2024-06-13

## 2024-06-11 RX ORDER — GABAPENTIN 400 MG/1
1 CAPSULE ORAL
Qty: 0 | Refills: 0 | DISCHARGE
Start: 2024-06-11

## 2024-06-11 RX ORDER — LACOSAMIDE 50 MG/1
1 TABLET ORAL
Qty: 0 | Refills: 0 | DISCHARGE
Start: 2024-06-11

## 2024-06-11 RX ORDER — NALOXONE HYDROCHLORIDE 4 MG/.1ML
1 SPRAY NASAL
Qty: 1 | Refills: 0
Start: 2024-06-11

## 2024-06-11 RX ORDER — ENOXAPARIN SODIUM 100 MG/ML
40 INJECTION SUBCUTANEOUS
Qty: 0 | Refills: 0 | DISCHARGE
Start: 2024-06-11

## 2024-06-11 RX ORDER — ATORVASTATIN CALCIUM 80 MG/1
1 TABLET, FILM COATED ORAL
Qty: 0 | Refills: 0 | DISCHARGE
Start: 2024-06-11

## 2024-06-11 RX ORDER — SENNA PLUS 8.6 MG/1
2 TABLET ORAL
Qty: 0 | Refills: 0 | DISCHARGE
Start: 2024-06-11

## 2024-06-11 RX ORDER — POLYETHYLENE GLYCOL 3350 17 G/17G
17 POWDER, FOR SOLUTION ORAL
Qty: 0 | Refills: 0 | DISCHARGE
Start: 2024-06-11

## 2024-06-11 RX ADMIN — GABAPENTIN 300 MILLIGRAM(S): 400 CAPSULE ORAL at 14:07

## 2024-06-11 RX ADMIN — GABAPENTIN 300 MILLIGRAM(S): 400 CAPSULE ORAL at 05:19

## 2024-06-11 RX ADMIN — LACOSAMIDE 150 MILLIGRAM(S): 50 TABLET ORAL at 16:21

## 2024-06-11 RX ADMIN — CHLORHEXIDINE GLUCONATE 1 APPLICATION(S): 213 SOLUTION TOPICAL at 11:51

## 2024-06-11 RX ADMIN — PANTOPRAZOLE SODIUM 40 MILLIGRAM(S): 20 TABLET, DELAYED RELEASE ORAL at 05:18

## 2024-06-11 RX ADMIN — Medication 81 MILLIGRAM(S): at 11:51

## 2024-06-11 RX ADMIN — LACOSAMIDE 150 MILLIGRAM(S): 50 TABLET ORAL at 05:19

## 2024-06-11 RX ADMIN — ENOXAPARIN SODIUM 40 MILLIGRAM(S): 100 INJECTION SUBCUTANEOUS at 16:20

## 2024-06-11 NOTE — PROGRESS NOTE ADULT - ASSESSMENT
53M admitted for hematoma on the left forearm.       Traumatic hematoma of left upper arm.   - S/P Mechanical fall (Walker got caught on sidewalk)  - CT Angio w/ 9.1 x 4.1 cm hematoma in soft tissues of   the posterior forearm with scattered punctate hyperdense foci, concern for contrast extravasation, Left forearm soft tissue hematoma with small foci of contrast extravasation.  - CT H without acute findings   - X-rays neg for acute fractures or dislocations  - CK down-trended to WNL   - Monitor for compartment syndrome   - Neurovascular checks   - Per Hand surgery, c/w elevation of forearm on pillows and blankets keeping elbow at 90 degrees  - Hand surgery clearance for AC appreciated. Home Lovenox 40 Qd resumed. Monitor   - Vascular surgery and Hand Surgery evals appreciated; F/u recs     H/O DVT, Anemia   - Pt was on Lovenox full dose for AC. Later changed to Lovenox 40 Qd  - LE Duplex negative   - Check serial CBC BID; Monitor H/H   - Heme/Onc eval appreciated: F/u recs --> AC resumed, trend CBC BID, cleared by surgery     Seizure disorder.   - EEG w/ mild slowing, neg for seizures-  AED per neuro  - On Lacosamide  - Seizure precautions  - Neuro eval appreciated; F/u recs    History of CVA (cerebrovascular accident).   - ResumeASA andcont lipitor   - Episode of reported "confusion" on 6/4 PM. EEG w/ mild slowing, neg for seizures AED per neuro      Encounter for postoperative wound care.   - Pt with left groin wound vac from previous fasciotomy   - Wound vac ran out of battery 6/4 AM. Vascular surgery team notified --> Wound vac per vascular / PT wound care    - PT Wound care eval, wound vac in place     Chronic pain.   - On gabapentin.  - Reports he had not taken oxycodone or tramadol in a while.    BPH  - C/w Flomax    Medication management.  - Have asked pharmacy to obtain med rec    Prophylactic measure.   - VTE ppx: Lovenox resumed   - PPI     stable hgb   D/C planing

## 2024-06-11 NOTE — PROGRESS NOTE ADULT - ASSESSMENT
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 Poor opacification of the arteries in the distal   forearm and hand. There is a 9.1 x 4.1 cm hematoma in the soft tissues of   the posterior forearm with scattered punctate hyperdense foci (304, 118,  119 and 122), suspicious for contrast extravasation.  EEG neg but mild slowing     Impression:   1) chronic strokes, resid minimal WFD  2) seizure d/o 2/2 strokes  3) ADHD  4) vertigo BPPV, stable   5) chornic L thigh hematoma   6) LLE compartment syndrome   7) new Left forearm hematoma   8) fall      - ASA 81 started, armaan ornelas   -  eeg results;  neg for seizures; mild slowing ; will consider lowering vimpat to 100 BID   - f/u vasc and vac team ; wound vac   -  was on ASA 81mg daily and lovenox 40mg    ; resume when able ; now only on lovenox 40   - monitor H/H.      - heme/onc recs appreicated   - monitor for compartment syndrome   - Lovenox full dose now held.  was injecting in thigh at site of hematoma ; previously was on full dose then charged to 40mg SQ   - for ADHD gets Vyvanse 50mg daily  - for seizure he is on vimpat 150 mg BID  - statin therapy with LDL goal < 70mg/dL; atorvastatin 20 at home   - meclizline PRN   - PT/OT   - check FS, glucose control <180  - GI/DVT ppx   - Thank you for allowing me to participate in the care of this patient. Call with questions.      Braxton Forde MD  Vascular Neurology  Office: [phone]

## 2024-06-11 NOTE — PROGRESS NOTE ADULT - SUBJECTIVE AND OBJECTIVE BOX
Name of Patient : TAMI DUNHAM  MRN: [id number]    Subjective: Patient seen and examined. No new events except as noted.     REVIEW OF SYSTEMS:    CONSTITUTIONAL: No weakness, fevers or chills  EYES/ENT: No visual changes;  No vertigo or throat pain   NECK: No pain or stiffness  RESPIRATORY: No cough, wheezing, hemoptysis; No shortness of breath  CARDIOVASCULAR: No chest pain or palpitations  GASTROINTESTINAL: No abdominal or epigastric pain. No nausea, vomiting, or hematemesis; No diarrhea or constipation. No melena or hematochezia.  GENITOURINARY: No dysuria, frequency or hematuria  NEUROLOGICAL: No numbness or weakness  SKIN: No itching, burning, rashes, or lesions   All other review of systems is negative unless indicated above.    MEDICATIONS:  MEDICATIONS  (STANDING):  acetaminophen   IVPB .. 1000 milliGRAM(s) IV Intermittent once  atorvastatin 20 milliGRAM(s) Oral at bedtime  enoxaparin Injectable 40 milliGRAM(s) SubCutaneous every 24 hours  gabapentin 300 milliGRAM(s) Oral three times a day  lacosamide 150 milliGRAM(s) Oral two times a day  lidocaine 1% Injectable 5 milliLiter(s) Local Injection Once  pantoprazole    Tablet 40 milliGRAM(s) Oral before breakfast  polyethylene glycol 3350 17 Gram(s) Oral daily  senna 2 Tablet(s) Oral at bedtime  sodium chloride 0.9%. 1000 milliLiter(s) (50 mL/Hr) IV Continuous <Continuous>  tamsulosin 0.4 milliGRAM(s) Oral at bedtime      PHYSICAL EXAM:  T(C): 36.8 (06-08-24 @ 00:26), Max: 36.8 (06-08-24 @ 00:26)  HR: 63 (06-08-24 @ 00:26) (62 - 66)  BP: 101/67 (06-08-24 @ 00:26) (101/67 - 113/68)  RR: 18 (06-08-24 @ 00:26) (18 - 18)  SpO2: 99% (06-08-24 @ 00:26) (95% - 99%)  Wt(kg): --  I&O's Summary    06 Jun 2024 07:01  -  07 Jun 2024 07:00  --------------------------------------------------------  IN: 0 mL / OUT: 1000 mL / NET: -1000 mL    07 Jun 2024 07:01  -  08 Jun 2024 00:34  --------------------------------------------------------  IN: 320 mL / OUT: 1000 mL / NET: -680 mL          Appearance: Normal	  HEENT:  PERRLA   Lymphatic: No lymphadenopathy   Cardiovascular: Normal S1 S2, no JVD  Respiratory: normal effort , clear  Gastrointestinal:  Soft, Non-tender  Skin: No rashes,  warm to touch  Psychiatry:  Mood & affect appropriate  Musculuskeletal: No edema    recent labs, Imaging and EKGs personally reviewed             06-06-24 @ 07:01  -  06-07-24 @ 07:00  --------------------------------------------------------  IN: 0 mL / OUT: 1000 mL / NET: -1000 mL    06-07-24 @ 07:01  -  06-08-24 @ 00:34  --------------------------------------------------------  IN: 320 mL / OUT: 1000 mL / NET: -680 mL            
Name of Patient : TAMI DUNHAM  MRN: [id number]  Date of visit: 06-08-24       Subjective: Patient seen and examined. No new events except as noted.     REVIEW OF SYSTEMS:    CONSTITUTIONAL: No weakness, fevers or chills  EYES/ENT: No visual changes;  No vertigo or throat pain   NECK: No pain or stiffness  RESPIRATORY: No cough, wheezing, hemoptysis; No shortness of breath  CARDIOVASCULAR: No chest pain or palpitations  GASTROINTESTINAL: No abdominal or epigastric pain. No nausea, vomiting, or hematemesis; No diarrhea or constipation. No melena or hematochezia.  GENITOURINARY: No dysuria, frequency or hematuria  NEUROLOGICAL: No numbness or weakness  SKIN: No itching, burning, rashes, or lesions   All other review of systems is negative unless indicated above.    MEDICATIONS:  MEDICATIONS  (STANDING):  acetaminophen   IVPB .. 1000 milliGRAM(s) IV Intermittent once  atorvastatin 20 milliGRAM(s) Oral at bedtime  enoxaparin Injectable 40 milliGRAM(s) SubCutaneous every 24 hours  gabapentin 300 milliGRAM(s) Oral three times a day  lacosamide 150 milliGRAM(s) Oral two times a day  lidocaine 1% Injectable 5 milliLiter(s) Local Injection Once  pantoprazole    Tablet 40 milliGRAM(s) Oral before breakfast  polyethylene glycol 3350 17 Gram(s) Oral daily  senna 2 Tablet(s) Oral at bedtime  sodium chloride 0.9%. 1000 milliLiter(s) (50 mL/Hr) IV Continuous <Continuous>  tamsulosin 0.4 milliGRAM(s) Oral at bedtime      PHYSICAL EXAM:  T(C): 36.8 (06-08-24 @ 20:03), Max: 36.8 (06-08-24 @ 00:26)  HR: 69 (06-08-24 @ 20:03) (60 - 69)  BP: 104/70 (06-08-24 @ 20:03) (98/66 - 104/70)  RR: 18 (06-08-24 @ 20:03) (18 - 18)  SpO2: 98% (06-08-24 @ 20:03) (98% - 100%)  Wt(kg): --  I&O's Summary    07 Jun 2024 07:01  -  08 Jun 2024 07:00  --------------------------------------------------------  IN: 320 mL / OUT: 1000 mL / NET: -680 mL    08 Jun 2024 07:01  -  08 Jun 2024 21:32  --------------------------------------------------------  IN: 0 mL / OUT: 1300 mL / NET: -1300 mL          Appearance: Normal	  HEENT:  PERRLA   Lymphatic: No lymphadenopathy   Cardiovascular: Normal S1 S2, no JVD  Respiratory: normal effort , clear  Gastrointestinal:  Soft, Non-tender  Skin: No rashes,  warm to touch  Psychiatry:  Mood & affect appropriate  Musculuskeletal: No edema    recent labs, Imaging and EKGs personally reviewed       06-07-24 @ 07:01  -  06-08-24 @ 07:00  --------------------------------------------------------  IN: 320 mL / OUT: 1000 mL / NET: -680 mL    06-08-24 @ 07:01  -  06-08-24 @ 21:32  --------------------------------------------------------  IN: 0 mL / OUT: 1300 mL / NET: -1300 mL                          10.7   6.12  )-----------( 165      ( 07 Jun 2024 07:23 )             33.3               06-07    141  |  107  |  18  ----------------------------<  80  3.8   |  21<L>  |  0.91    Ca    8.5      07 Jun 2024 07:23  Phos  2.9     06-07  Mg     2.4     06-07    TPro  6.1  /  Alb  3.4  /  TBili  0.3  /  DBili  x   /  AST  14  /  ALT  19  /  AlkPhos  63  06-07                       Urinalysis Basic - ( 07 Jun 2024 07:23 )    Color: x / Appearance: x / SG: x / pH: x  Gluc: 80 mg/dL / Ketone: x  / Bili: x / Urobili: x   Blood: x / Protein: x / Nitrite: x   Leuk Esterase: x / RBC: x / WBC x   Sq Epi: x / Non Sq Epi: x / Bacteria: x              
Neurology   S: patient seen.  in chair       Medications: MEDICATIONS  (STANDING):  acetaminophen   IVPB .. 1000 milliGRAM(s) IV Intermittent once  atorvastatin 20 milliGRAM(s) Oral at bedtime  enoxaparin Injectable 40 milliGRAM(s) SubCutaneous every 24 hours  gabapentin 300 milliGRAM(s) Oral three times a day  lacosamide 150 milliGRAM(s) Oral two times a day  lidocaine 1% Injectable 5 milliLiter(s) Local Injection Once  pantoprazole    Tablet 40 milliGRAM(s) Oral before breakfast  polyethylene glycol 3350 17 Gram(s) Oral daily  senna 2 Tablet(s) Oral at bedtime  sodium chloride 0.9%. 1000 milliLiter(s) (50 mL/Hr) IV Continuous <Continuous>  tamsulosin 0.4 milliGRAM(s) Oral at bedtime    MEDICATIONS  (PRN):  acetaminophen     Tablet .. 650 milliGRAM(s) Oral every 6 hours PRN Temp greater or equal to 38C (100.4F), Mild Pain (1 - 3)  aluminum hydroxide/magnesium hydroxide/simethicone Suspension 30 milliLiter(s) Oral every 4 hours PRN Dyspepsia  HYDROmorphone  Injectable 0.5 milliGRAM(s) IV Push every 4 hours PRN Severe Pain (7 - 10)  melatonin 3 milliGRAM(s) Oral at bedtime PRN Insomnia  ondansetron Injectable 4 milliGRAM(s) IV Push every 8 hours PRN Nausea and/or Vomiting       Vitals:  Vital Signs Last 24 Hrs  T(C): 36.7 (09 Jun 2024 17:47), Max: 36.8 (08 Jun 2024 20:03)  T(F): 98 (09 Jun 2024 17:47), Max: 98.2 (08 Jun 2024 20:03)  HR: 77 (09 Jun 2024 17:47) (59 - 77)  BP: 113/76 (09 Jun 2024 17:47) (104/70 - 128/71)  BP(mean): --  RR: 18 (09 Jun 2024 17:47) (18 - 18)  SpO2: 98% (09 Jun 2024 17:47) (98% - 99%)    Parameters below as of 09 Jun 2024 08:40  Patient On (Oxygen Delivery Method): room air                   General Exam:   General Appearance: Appropriately dressed and in no acute distress       Head: Normocephalic, atraumatic and no dysmorphic features  Ear, Nose, and Throat: Moist mucous membranes  CVS: S1S2+  Resp: No SOB, no wheeze or rhonchi  GI: soft NT/ND  Extremities:  LLE wound from prior hematoma. LUE hematoma   Skin: bruieses noted      Neurological Exam:  Mental Status: Awake, alert and oriented x 3, minimal WFD/[place].  Able to follow simple and complex verbal commands. Able to name and repeat. fluent speech. No obvious aphasia or dysarthria noted.   Cranial Nerves: PERRL, EOMI, VFFC, sensation V1-V3 intact,  no obvious facial asymmetry, equal elevation of palate, scm/trap 5/5, tongue is midline on protrusion. no obvious papilledema on fundoscopic exam. hearing is grossly intact.   Motor: Normal bulk, tone and strength throughout. Fine finger movements were intact and symmetric. no tremors or drift noted.    Sensation: Intact to light touch and pinprick throughout. no right/left confusion. no extinction to tactile on DSS.   Reflexes: 1+ throughout at biceps, brachioradialis, triceps, patellars and ankles bilaterally and equal. No clonus. R toe and L toe were both downgoing.  Coordination: No dysmetria on FNF    Gait:  walker     Data/Labs/Imaging which I personally reviewed.     Labs:   no new labs             < from: CT Head No Cont (06.03.24 @ 19:57) >    ACC: [id number] EXAM:  CT BRAIN   ORDERED BY: WOODY MARIE     PROCEDURE DATE:  06/03/2024          INTERPRETATION:  CLINICAL INFORMATION: Fall.    TECHNIQUE: Noncontrast axial CT images were acquired through the head.   Two-dimensional sagittal and coronal reformats were generated.    COMPARISON STUDY: CT head 5/2/2024. Prior contrast enhanced brain MRI   study from 4/26/2023.    FINDINGS:    There is no CT evidence of acute intracranial hemorrhage, extra-axial   collection, vasogenic edema, mass effect, midline shift, central   herniation, or hydrocephalus.    Stable calcifications in the central alexis, bilateral temporal lobes, and   right frontal lobe.    There is age appropriate cerebral volume loss with patchy white matter   hypoattenuation likely related to microvascular disease.    Mucosal thickening/debris in the bilateral maxillary and ethmoid sinuses.   The mastoid air cells and middle ear cavities are clear.    The soft tissues of the scalp are unremarkable. The calvarium isintact.    IMPRESSION:    No CT evidence of acute intracranial hemorrhage, edema, or mass effect.    --- End of Report ---           AKIL OLIVIA MD; Resident Radiologist  This document has been electronically signed.  SERENA MILTON MD; Attending Radiologist  This document has been electronically signed. Joshua  3 2024  8:32PM    < end of copied text >      
Patient seen and examined on AM rounds. Patient reports that they're feeling well. Denies fever, chills. Reports pain as controlled. No complaints at this time.     Vital Signs Last 24 Hrs  T(C): 36.8 (06 Jun 2024 00:47), Max: 37.3 (05 Jun 2024 15:59)  T(F): 98.2 (06 Jun 2024 00:47), Max: 99.2 (05 Jun 2024 15:59)  HR: 71 (06 Jun 2024 00:47) (64 - 71)  BP: 99/65 (06 Jun 2024 00:47) (99/65 - 103/69)  BP(mean): --  RR: 18 (06 Jun 2024 00:47) (18 - 18)  SpO2: 93% (06 Jun 2024 00:47) (93% - 98%)    Parameters below as of 06 Jun 2024 00:47  Patient On (Oxygen Delivery Method): room air    Phys:  Gen: Resting comfortably  Ext: LUE forearm softer and less tender, hand exam is stable    
Vascular Surgery Progress Note    SUBJECTIVE: Pt seen and examined at bedside. Patient comfortable and in no-apparent distress. Wound vac taken down at bedside.     Vital Signs Last 24 Hrs  T(C): 36.4 (04 Jun 2024 09:55), Max: 36.6 (03 Jun 2024 16:40)  T(F): 97.6 (04 Jun 2024 09:55), Max: 97.9 (03 Jun 2024 16:40)  HR: 73 (04 Jun 2024 09:55) (64 - 103)  BP: 105/69 (04 Jun 2024 09:55) (100/69 - 117/88)  BP(mean): 94 (03 Jun 2024 18:31) (94 - 103)  RR: 18 (04 Jun 2024 09:55) (18 - 18)  SpO2: 98% (04 Jun 2024 09:55) (96% - 100%)    Parameters below as of 04 Jun 2024 09:55  Patient On (Oxygen Delivery Method): room air    Physical Exam:  General Appearance: Appears well, NAD  Respiratory: No labored breathing  CV: Pulse regularly present  Abdomen: Soft, nontender  Extremities: L inner thigh wound with healthy pink granulation tissue, well healing     LABS:                        11.7   6.26  )-----------( 177      ( 04 Jun 2024 06:51 )             36.4     06-04    142  |  103  |  14  ----------------------------<  83  3.5   |  24  |  0.95    Ca    8.8      04 Jun 2024 06:51  Phos  4.5     06-04  Mg     2.3     06-04    TPro  6.5  /  Alb  3.7  /  TBili  0.5  /  DBili  x   /  AST  18  /  ALT  22  /  AlkPhos  78  06-04    PT/INR - ( 03 Jun 2024 20:22 )   PT: 11.2 sec;   INR: 1.02 ratio         PTT - ( 03 Jun 2024 20:22 )  PTT:30.5 sec  Urinalysis Basic - ( 04 Jun 2024 06:51 )    Color: x / Appearance: x / SG: x / pH: x  Gluc: 83 mg/dL / Ketone: x  / Bili: x / Urobili: x   Blood: x / Protein: x / Nitrite: x   Leuk Esterase: x / RBC: x / WBC x   Sq Epi: x / Non Sq Epi: x / Bacteria: x        INs and OUTs:  
Name of Patient : TAMI DUNHAM  MRN: [id number]  Date of visit: 06-09-24      Subjective: Patient seen and examined. No new events except as noted.     REVIEW OF SYSTEMS:    CONSTITUTIONAL: No weakness, fevers or chills  EYES/ENT: No visual changes;  No vertigo or throat pain   NECK: No pain or stiffness  RESPIRATORY: No cough, wheezing, hemoptysis; No shortness of breath  CARDIOVASCULAR: No chest pain or palpitations  GASTROINTESTINAL: No abdominal or epigastric pain. No nausea, vomiting, or hematemesis; No diarrhea or constipation. No melena or hematochezia.  GENITOURINARY: No dysuria, frequency or hematuria  NEUROLOGICAL: No numbness or weakness  SKIN: No itching, burning, rashes, or lesions   All other review of systems is negative unless indicated above.    MEDICATIONS:  MEDICATIONS  (STANDING):  acetaminophen   IVPB .. 1000 milliGRAM(s) IV Intermittent once  atorvastatin 20 milliGRAM(s) Oral at bedtime  enoxaparin Injectable 40 milliGRAM(s) SubCutaneous every 24 hours  gabapentin 300 milliGRAM(s) Oral three times a day  lacosamide 150 milliGRAM(s) Oral two times a day  lidocaine 1% Injectable 5 milliLiter(s) Local Injection Once  pantoprazole    Tablet 40 milliGRAM(s) Oral before breakfast  polyethylene glycol 3350 17 Gram(s) Oral daily  senna 2 Tablet(s) Oral at bedtime  sodium chloride 0.9%. 1000 milliLiter(s) (50 mL/Hr) IV Continuous <Continuous>  tamsulosin 0.4 milliGRAM(s) Oral at bedtime      PHYSICAL EXAM:  T(C): 36.7 (06-09-24 @ 17:47), Max: 36.7 (06-09-24 @ 17:47)  HR: 77 (06-09-24 @ 17:47) (59 - 77)  BP: 113/76 (06-09-24 @ 17:47) (113/76 - 128/71)  RR: 18 (06-09-24 @ 17:47) (18 - 18)  SpO2: 98% (06-09-24 @ 17:47) (98% - 99%)  Wt(kg): --  I&O's Summary    08 Jun 2024 07:01  -  09 Jun 2024 07:00  --------------------------------------------------------  IN: 0 mL / OUT: 1300 mL / NET: -1300 mL    09 Jun 2024 07:01  -  09 Jun 2024 23:51  --------------------------------------------------------  IN: 240 mL / OUT: 1100 mL / NET: -860 mL          Appearance: Normal	  HEENT:  PERRLA   Lymphatic: No lymphadenopathy   Cardiovascular: Normal S1 S2, no JVD  Respiratory: normal effort , clear  Gastrointestinal:  Soft, Non-tender  Skin: No rashes,  warm to touch  Psychiatry:  Mood & affect appropriate  Musculuskeletal: No edema    recent labs, Imaging and EKGs personally reviewed     06-08-24 @ 07:01  -  06-09-24 @ 07:00  --------------------------------------------------------  IN: 0 mL / OUT: 1300 mL / NET: -1300 mL    06-09-24 @ 07:01  -  06-09-24 @ 23:51  --------------------------------------------------------  IN: 240 mL / OUT: 1100 mL / NET: -860 mL            
Name of Patient : TAMI DUNHAM  MRN: [id number]  Date of visit: 06-11-24       Subjective: Patient seen and examined. No new events except as noted.     REVIEW OF SYSTEMS:    CONSTITUTIONAL: No weakness, fevers or chills  EYES/ENT: No visual changes;  No vertigo or throat pain   NECK: No pain or stiffness  RESPIRATORY: No cough, wheezing, hemoptysis; No shortness of breath  CARDIOVASCULAR: No chest pain or palpitations  GASTROINTESTINAL: No abdominal or epigastric pain. No nausea, vomiting, or hematemesis; No diarrhea or constipation. No melena or hematochezia.  GENITOURINARY: No dysuria, frequency or hematuria  NEUROLOGICAL: No numbness or weakness  SKIN: No itching, burning, rashes, or lesions   All other review of systems is negative unless indicated above.    MEDICATIONS:  MEDICATIONS  (STANDING):  acetaminophen   IVPB .. 1000 milliGRAM(s) IV Intermittent once  aspirin  chewable 81 milliGRAM(s) Oral daily  atorvastatin 20 milliGRAM(s) Oral at bedtime  chlorhexidine 2% Cloths 1 Application(s) Topical daily  enoxaparin Injectable 40 milliGRAM(s) SubCutaneous every 24 hours  gabapentin 300 milliGRAM(s) Oral three times a day  lacosamide 150 milliGRAM(s) Oral two times a day  lidocaine 1% Injectable 5 milliLiter(s) Local Injection Once  pantoprazole    Tablet 40 milliGRAM(s) Oral before breakfast  polyethylene glycol 3350 17 Gram(s) Oral daily  senna 2 Tablet(s) Oral at bedtime  sodium chloride 0.9%. 1000 milliLiter(s) (50 mL/Hr) IV Continuous <Continuous>  tamsulosin 0.4 milliGRAM(s) Oral at bedtime      PHYSICAL EXAM:  T(C): 36.8 (06-11-24 @ 16:02), Max: 36.8 (06-11-24 @ 16:02)  HR: 81 (06-11-24 @ 16:02) (62 - 81)  BP: 128/80 (06-11-24 @ 16:02) (91/62 - 128/80)  RR: 18 (06-11-24 @ 16:02) (18 - 18)  SpO2: 97% (06-11-24 @ 16:02) (97% - 100%)  Wt(kg): --  I&O's Summary    10 Joshua 2024 07:01  -  11 Jun 2024 07:00  --------------------------------------------------------  IN: 720 mL / OUT: 1150 mL / NET: -430 mL    11 Jun 2024 07:01  -  11 Jun 2024 23:30  --------------------------------------------------------  IN: 0 mL / OUT: 900 mL / NET: -900 mL          Appearance: Normal	  HEENT:  PERRLA   Lymphatic: No lymphadenopathy   Cardiovascular: Normal S1 S2, no JVD  Respiratory: normal effort , clear  Gastrointestinal:  Soft, Non-tender  Skin: No rashes,  warm to touch  Psychiatry:  Mood & affect appropriate  Musculuskeletal: No edema    recent labs, Imaging and EKGs personally reviewed     06-10-24 @ 07:01  -  06-11-24 @ 07:00  --------------------------------------------------------  IN: 720 mL / OUT: 1150 mL / NET: -430 mL    06-11-24 @ 07:01  -  06-11-24 @ 23:30  --------------------------------------------------------  IN: 0 mL / OUT: 900 mL / NET: -900 mL                          11.5   5.53  )-----------( 165      ( 11 Jun 2024 11:43 )             35.1               06-11    142  |  106  |  14  ----------------------------<  94  4.1   |  24  |  0.89    Ca    9.2      11 Jun 2024 11:43                         Urinalysis Basic - ( 11 Jun 2024 11:43 )    Color: x / Appearance: x / SG: x / pH: x  Gluc: 94 mg/dL / Ketone: x  / Bili: x / Urobili: x   Blood: x / Protein: x / Nitrite: x   Leuk Esterase: x / RBC: x / WBC x   Sq Epi: x / Non Sq Epi: x / Bacteria: x              
Name of Patient : TAMI DUNHAM  MRN: [id number]  Date of visit: 06-10-24       Subjective: Patient seen and examined. No new events except as noted.   doing okay     REVIEW OF SYSTEMS:    CONSTITUTIONAL: No weakness, fevers or chills  EYES/ENT: No visual changes;  No vertigo or throat pain   NECK: No pain or stiffness  RESPIRATORY: No cough, wheezing, hemoptysis; No shortness of breath  CARDIOVASCULAR: No chest pain or palpitations  GASTROINTESTINAL: No abdominal or epigastric pain. No nausea, vomiting, or hematemesis; No diarrhea or constipation. No melena or hematochezia.  GENITOURINARY: No dysuria, frequency or hematuria  NEUROLOGICAL: No numbness or weakness  SKIN: No itching, burning, rashes, or lesions   All other review of systems is negative unless indicated above.    MEDICATIONS:  MEDICATIONS  (STANDING):  acetaminophen   IVPB .. 1000 milliGRAM(s) IV Intermittent once  aspirin  chewable 81 milliGRAM(s) Oral daily  atorvastatin 20 milliGRAM(s) Oral at bedtime  chlorhexidine 2% Cloths 1 Application(s) Topical daily  enoxaparin Injectable 40 milliGRAM(s) SubCutaneous every 24 hours  gabapentin 300 milliGRAM(s) Oral three times a day  lacosamide 150 milliGRAM(s) Oral two times a day  lidocaine 1% Injectable 5 milliLiter(s) Local Injection Once  pantoprazole    Tablet 40 milliGRAM(s) Oral before breakfast  polyethylene glycol 3350 17 Gram(s) Oral daily  senna 2 Tablet(s) Oral at bedtime  sodium chloride 0.9%. 1000 milliLiter(s) (50 mL/Hr) IV Continuous <Continuous>  tamsulosin 0.4 milliGRAM(s) Oral at bedtime      PHYSICAL EXAM:  T(C): 36.4 (06-10-24 @ 15:29), Max: 36.8 (06-10-24 @ 00:11)  HR: 65 (06-10-24 @ 15:29) (65 - 72)  BP: 101/71 (06-10-24 @ 15:29) (100/64 - 118/75)  RR: 18 (06-10-24 @ 15:29) (18 - 18)  SpO2: 97% (06-10-24 @ 15:29) (97% - 98%)  Wt(kg): --  I&O's Summary    09 Jun 2024 07:01  -  10 Joshua 2024 07:00  --------------------------------------------------------  IN: 240 mL / OUT: 1100 mL / NET: -860 mL    10 Joshua 2024 07:01  -  10 Joshua 2024 23:08  --------------------------------------------------------  IN: 720 mL / OUT: 1150 mL / NET: -430 mL          Appearance: Normal	  HEENT:  PERRLA   Lymphatic: No lymphadenopathy   Cardiovascular: Normal S1 S2, no JVD  Respiratory: normal effort , clear  Gastrointestinal:  Soft, Non-tender  Skin: No rashes,  warm to touch  Psychiatry:  Mood & affect appropriate  Musculuskeletal: No edema    recent labs, Imaging and EKGs personally reviewed     06-09-24 @ 07:01  -  06-10-24 @ 07:00  --------------------------------------------------------  IN: 240 mL / OUT: 1100 mL / NET: -860 mL    06-10-24 @ 07:01  -  06-10-24 @ 23:08  --------------------------------------------------------  IN: 720 mL / OUT: 1150 mL / NET: -430 mL            
Neurology   S: patient seen.  in chair         Medications: MEDICATIONS  (STANDING):  acetaminophen   IVPB .. 1000 milliGRAM(s) IV Intermittent once  atorvastatin 20 milliGRAM(s) Oral at bedtime  gabapentin 300 milliGRAM(s) Oral three times a day  lacosamide 150 milliGRAM(s) Oral two times a day  lidocaine 1% Injectable 5 milliLiter(s) Local Injection Once  pantoprazole    Tablet 40 milliGRAM(s) Oral before breakfast  sodium chloride 0.9%. 1000 milliLiter(s) (50 mL/Hr) IV Continuous <Continuous>  tamsulosin 0.4 milliGRAM(s) Oral at bedtime    MEDICATIONS  (PRN):  aluminum hydroxide/magnesium hydroxide/simethicone Suspension 30 milliLiter(s) Oral every 4 hours PRN Dyspepsia  HYDROmorphone  Injectable 0.5 milliGRAM(s) IV Push every 4 hours PRN Severe Pain (7 - 10)  melatonin 3 milliGRAM(s) Oral at bedtime PRN Insomnia  ondansetron Injectable 4 milliGRAM(s) IV Push every 8 hours PRN Nausea and/or Vomiting       Vitals:  Vital Signs Last 24 Hrs  T(C): 36.8 (06 Jun 2024 00:47), Max: 37.3 (05 Jun 2024 15:59)  T(F): 98.2 (06 Jun 2024 00:47), Max: 99.2 (05 Jun 2024 15:59)  HR: 71 (06 Jun 2024 00:47) (64 - 71)  BP: 99/65 (06 Jun 2024 00:47) (99/65 - 103/69)  BP(mean): --  RR: 18 (06 Jun 2024 00:47) (18 - 18)  SpO2: 93% (06 Jun 2024 00:47) (93% - 98%)    Parameters below as of 06 Jun 2024 00:47  Patient On (Oxygen Delivery Method): room air           General Exam:   General Appearance: Appropriately dressed and in no acute distress       Head: Normocephalic, atraumatic and no dysmorphic features  Ear, Nose, and Throat: Moist mucous membranes  CVS: S1S2+  Resp: No SOB, no wheeze or rhonchi  GI: soft NT/ND  Extremities:  LLE wound from prior hematoma. LUE hematoma   Skin: bruieses noted      Neurological Exam:  Mental Status: Awake, alert and oriented x 3, minimal WFD/[place].  Able to follow simple and complex verbal commands. Able to name and repeat. fluent speech. No obvious aphasia or dysarthria noted.   Cranial Nerves: PERRL, EOMI, VFFC, sensation V1-V3 intact,  no obvious facial asymmetry, equal elevation of palate, scm/trap 5/5, tongue is midline on protrusion. no obvious papilledema on fundoscopic exam. hearing is grossly intact.   Motor: Normal bulk, tone and strength throughout. Fine finger movements were intact and symmetric. no tremors or drift noted.    Sensation: Intact to light touch and pinprick throughout. no right/left confusion. no extinction to tactile on DSS.   Reflexes: 1+ throughout at biceps, brachioradialis, triceps, patellars and ankles bilaterally and equal. No clonus. R toe and L toe were both downgoing.  Coordination: No dysmetria on FNF    Gait:  walker     Data/Labs/Imaging which I personally reviewed.     Labs:     LABS:                          10.2   5.56  )-----------( 168      ( 06 Jun 2024 07:39 )             32.5     06-06    142  |  108  |  18  ----------------------------<  94  4.0   |  19<L>  |  1.10    Ca    8.1<L>      06 Jun 2024 07:39  Phos  4.0     06-06  Mg     2.3     06-06    TPro  6.3  /  Alb  3.4  /  TBili  0.2  /  DBili  x   /  AST  14  /  ALT  18  /  AlkPhos  67  06-06    LIVER FUNCTIONS - ( 06 Jun 2024 07:39 )  Alb: 3.4 g/dL / Pro: 6.3 g/dL / ALK PHOS: 67 U/L / ALT: 18 U/L / AST: 14 U/L / GGT: x             Urinalysis Basic - ( 06 Jun 2024 07:39 )    Color: x / Appearance: x / SG: x / pH: x  Gluc: 94 mg/dL / Ketone: x  / Bili: x / Urobili: x   Blood: x / Protein: x / Nitrite: x   Leuk Esterase: x / RBC: x / WBC x   Sq Epi: x / Non Sq Epi: x / Bacteria: x                  < from: CT Head No Cont (06.03.24 @ 19:57) >    ACC: [id number] EXAM:  CT BRAIN   ORDERED BY: WOODY LAINARADHA     PROCEDURE DATE:  06/03/2024          INTERPRETATION:  CLINICAL INFORMATION: Fall.    TECHNIQUE: Noncontrast axial CT images were acquired through the head.   Two-dimensional sagittal and coronal reformats were generated.    COMPARISON STUDY: CT head 5/2/2024. Prior contrast enhanced brain MRI   study from 4/26/2023.    FINDINGS:    There is no CT evidence of acute intracranial hemorrhage, extra-axial   collection, vasogenic edema, mass effect, midline shift, central   herniation, or hydrocephalus.    Stable calcifications in the central alexis, bilateral temporal lobes, and   right frontal lobe.    There is age appropriate cerebral volume loss with patchy white matter   hypoattenuation likely related to microvascular disease.    Mucosal thickening/debris in the bilateral maxillary and ethmoid sinuses.   The mastoid air cells and middle ear cavities are clear.    The soft tissues of the scalp are unremarkable. The calvarium isintact.    IMPRESSION:    No CT evidence of acute intracranial hemorrhage, edema, or mass effect.    --- End of Report ---           AKIL OLIVIA MD; Resident Radiologist  This document has been electronically signed.  SERENA MILTON MD; Attending Radiologist  This document has been electronically signed. Joshua  3 2024  8:32PM    < end of copied text >      
Neurology   S: patient seen.  in chair ; c/o LUE pain       Medications: MEDICATIONS  (STANDING):  acetaminophen   IVPB .. 1000 milliGRAM(s) IV Intermittent once  aspirin  chewable 81 milliGRAM(s) Oral daily  atorvastatin 20 milliGRAM(s) Oral at bedtime  chlorhexidine 2% Cloths 1 Application(s) Topical daily  enoxaparin Injectable 40 milliGRAM(s) SubCutaneous every 24 hours  gabapentin 300 milliGRAM(s) Oral three times a day  lacosamide 150 milliGRAM(s) Oral two times a day  lidocaine 1% Injectable 5 milliLiter(s) Local Injection Once  pantoprazole    Tablet 40 milliGRAM(s) Oral before breakfast  polyethylene glycol 3350 17 Gram(s) Oral daily  senna 2 Tablet(s) Oral at bedtime  sodium chloride 0.9%. 1000 milliLiter(s) (50 mL/Hr) IV Continuous <Continuous>  tamsulosin 0.4 milliGRAM(s) Oral at bedtime    MEDICATIONS  (PRN):  acetaminophen     Tablet .. 650 milliGRAM(s) Oral every 6 hours PRN Temp greater or equal to 38C (100.4F), Mild Pain (1 - 3)  aluminum hydroxide/magnesium hydroxide/simethicone Suspension 30 milliLiter(s) Oral every 4 hours PRN Dyspepsia  HYDROmorphone  Injectable 0.5 milliGRAM(s) IV Push every 4 hours PRN Severe Pain (7 - 10)  melatonin 3 milliGRAM(s) Oral at bedtime PRN Insomnia  ondansetron Injectable 4 milliGRAM(s) IV Push every 8 hours PRN Nausea and/or Vomiting       Vitals:  Vital Signs Last 24 Hrs  T(C): 36.7 (10 Joshua 2024 08:00), Max: 36.8 (10 Joshua 2024 00:11)  T(F): 98 (10 Joshua 2024 08:00), Max: 98.3 (10 Joshua 2024 00:11)  HR: 66 (10 Joshua 2024 08:00) (66 - 77)  BP: 118/75 (10 Joshua 2024 08:00) (100/64 - 118/75)  BP(mean): --  RR: 18 (10 Joshua 2024 08:00) (18 - 18)  SpO2: 98% (10 Joshua 2024 08:00) (97% - 98%)    Parameters below as of 10 Joshua 2024 08:00  Patient On (Oxygen Delivery Method): room air           General Exam:   General Appearance: Appropriately dressed and in no acute distress       Head: Normocephalic, atraumatic and no dysmorphic features  Ear, Nose, and Throat: Moist mucous membranes  CVS: S1S2+  Resp: No SOB, no wheeze or rhonchi  GI: soft NT/ND  Extremities:  LLE wound from prior hematoma. LUE hematoma   Skin: bruieses noted      Neurological Exam:  Mental Status: Awake, alert and oriented x 3, minimal WFD/[place].  Able to follow simple and complex verbal commands. Able to name and repeat. fluent speech. No obvious aphasia or dysarthria noted.   Cranial Nerves: PERRL, EOMI, VFFC, sensation V1-V3 intact,  no obvious facial asymmetry, equal elevation of palate, scm/trap 5/5, tongue is midline on protrusion. no obvious papilledema on fundoscopic exam. hearing is grossly intact.   Motor: Normal bulk, tone and strength throughout. Fine finger movements were intact and symmetric. no tremors or drift noted.    Sensation: Intact to light touch and pinprick throughout. no right/left confusion. no extinction to tactile on DSS.   Reflexes: 1+ throughout at biceps, brachioradialis, triceps, patellars and ankles bilaterally and equal. No clonus. R toe and L toe were both downgoing.  Coordination: No dysmetria on FNF    Gait:  walker     Data/Labs/Imaging which I personally reviewed.     Labs:   no new labs             < from: CT Head No Cont (06.03.24 @ 19:57) >    ACC: [id number] EXAM:  CT BRAIN   ORDERED BY: WOODY MARIE     PROCEDURE DATE:  06/03/2024          INTERPRETATION:  CLINICAL INFORMATION: Fall.    TECHNIQUE: Noncontrast axial CT images were acquired through the head.   Two-dimensional sagittal and coronal reformats were generated.    COMPARISON STUDY: CT head 5/2/2024. Prior contrast enhanced brain MRI   study from 4/26/2023.    FINDINGS:    There is no CT evidence of acute intracranial hemorrhage, extra-axial   collection, vasogenic edema, mass effect, midline shift, central   herniation, or hydrocephalus.    Stable calcifications in the central alexis, bilateral temporal lobes, and   right frontal lobe.    There is age appropriate cerebral volume loss with patchy white matter   hypoattenuation likely related to microvascular disease.    Mucosal thickening/debris in the bilateral maxillary and ethmoid sinuses.   The mastoid air cells and middle ear cavities are clear.    The soft tissues of the scalp are unremarkable. The calvarium isintact.    IMPRESSION:    No CT evidence of acute intracranial hemorrhage, edema, or mass effect.    --- End of Report ---           AKIL OLIVIA MD; Resident Radiologist  This document has been electronically signed.  SERENA MILTON MD; Attending Radiologist  This document has been electronically signed. Joshua  3 2024  8:32PM    < end of copied text >      
Neurology   S: patient seen. resting in bed       Medications: MEDICATIONS  (STANDING):  acetaminophen   IVPB .. 1000 milliGRAM(s) IV Intermittent once  atorvastatin 20 milliGRAM(s) Oral at bedtime  gabapentin 300 milliGRAM(s) Oral three times a day  lacosamide 150 milliGRAM(s) Oral two times a day  lidocaine 1% Injectable 5 milliLiter(s) Local Injection Once  pantoprazole    Tablet 40 milliGRAM(s) Oral before breakfast  sodium chloride 0.9%. 1000 milliLiter(s) (50 mL/Hr) IV Continuous <Continuous>  tamsulosin 0.4 milliGRAM(s) Oral at bedtime    MEDICATIONS  (PRN):  aluminum hydroxide/magnesium hydroxide/simethicone Suspension 30 milliLiter(s) Oral every 4 hours PRN Dyspepsia  HYDROmorphone  Injectable 0.5 milliGRAM(s) IV Push every 4 hours PRN Severe Pain (7 - 10)  melatonin 3 milliGRAM(s) Oral at bedtime PRN Insomnia  ondansetron Injectable 4 milliGRAM(s) IV Push every 8 hours PRN Nausea and/or Vomiting       Vitals:  Vital Signs Last 24 Hrs  T(C): 37.3 (05 Jun 2024 15:59), Max: 37.3 (05 Jun 2024 15:59)  T(F): 99.2 (05 Jun 2024 15:59), Max: 99.2 (05 Jun 2024 15:59)  HR: 67 (05 Jun 2024 15:59) (64 - 67)  BP: 102/66 (05 Jun 2024 15:59) (102/66 - 103/69)  BP(mean): --  RR: 18 (05 Jun 2024 15:59) (18 - 18)  SpO2: 97% (05 Jun 2024 15:59) (97% - 98%)    Parameters below as of 05 Jun 2024 10:54  Patient On (Oxygen Delivery Method): room air            General Exam:   General Appearance: Appropriately dressed and in no acute distress       Head: Normocephalic, atraumatic and no dysmorphic features  Ear, Nose, and Throat: Moist mucous membranes  CVS: S1S2+  Resp: No SOB, no wheeze or rhonchi  GI: soft NT/ND  Extremities:  LLE wound from prior hematoma. LUE hematoma   Skin: bruieses noted      Neurological Exam:  Mental Status: Awake, alert and oriented x 3, minimal WFD/[place].  Able to follow simple and complex verbal commands. Able to name and repeat. fluent speech. No obvious aphasia or dysarthria noted.   Cranial Nerves: PERRL, EOMI, VFFC, sensation V1-V3 intact,  no obvious facial asymmetry, equal elevation of palate, scm/trap 5/5, tongue is midline on protrusion. no obvious papilledema on fundoscopic exam. hearing is grossly intact.   Motor: Normal bulk, tone and strength throughout. Fine finger movements were intact and symmetric. no tremors or drift noted.    Sensation: Intact to light touch and pinprick throughout. no right/left confusion. no extinction to tactile on DSS.   Reflexes: 1+ throughout at biceps, brachioradialis, triceps, patellars and ankles bilaterally and equal. No clonus. R toe and L toe were both downgoing.  Coordination: No dysmetria on FNF    Gait:  walker     Data/Labs/Imaging which I personally reviewed.     Labs:     LABS:                          11.0   5.87  )-----------( 166      ( 05 Jun 2024 07:12 )             33.9     06-05    142  |  107  |  14  ----------------------------<  95  3.7   |  22  |  0.94    Ca    8.4      05 Jun 2024 07:07  Phos  3.6     06-05  Mg     2.2     06-05    TPro  6.2  /  Alb  3.4  /  TBili  0.2  /  DBili  x   /  AST  16  /  ALT  19  /  AlkPhos  70  06-05    LIVER FUNCTIONS - ( 05 Jun 2024 07:07 )  Alb: 3.4 g/dL / Pro: 6.2 g/dL / ALK PHOS: 70 U/L / ALT: 19 U/L / AST: 16 U/L / GGT: x             Urinalysis Basic - ( 05 Jun 2024 07:07 )    Color: x / Appearance: x / SG: x / pH: x  Gluc: 95 mg/dL / Ketone: x  / Bili: x / Urobili: x   Blood: x / Protein: x / Nitrite: x   Leuk Esterase: x / RBC: x / WBC x   Sq Epi: x / Non Sq Epi: x / Bacteria: x            < from: CT Head No Cont (06.03.24 @ 19:57) >    ACC: [id number] EXAM:  CT BRAIN   ORDERED BY: WOODY LAINARADHA     PROCEDURE DATE:  06/03/2024          INTERPRETATION:  CLINICAL INFORMATION: Fall.    TECHNIQUE: Noncontrast axial CT images were acquired through the head.   Two-dimensional sagittal and coronal reformats were generated.    COMPARISON STUDY: CT head 5/2/2024. Prior contrast enhanced brain MRI   study from 4/26/2023.    FINDINGS:    There is no CT evidence of acute intracranial hemorrhage, extra-axial   collection, vasogenic edema, mass effect, midline shift, central   herniation, or hydrocephalus.    Stable calcifications in the central alexis, bilateral temporal lobes, and   right frontal lobe.    There is age appropriate cerebral volume loss with patchy white matter   hypoattenuation likely related to microvascular disease.    Mucosal thickening/debris in the bilateral maxillary and ethmoid sinuses.   The mastoid air cells and middle ear cavities are clear.    The soft tissues of the scalp are unremarkable. The calvarium isintact.    IMPRESSION:    No CT evidence of acute intracranial hemorrhage, edema, or mass effect.    --- End of Report ---           AKIL OLIVIA MD; Resident Radiologist  This document has been electronically signed.  SERENA MILTON MD; Attending Radiologist  This document has been electronically signed. Joshua  3 2024  8:32PM    < end of copied text >      
Patient is a 53y old  Male who presents with a chief complaint of same (03 Jun 2024 23:39)    Patient seen and examined.  Resting in bed, appears comfortable  No new complaints    MEDICATIONS  (STANDING):  acetaminophen   IVPB .. 1000 milliGRAM(s) IV Intermittent once  atorvastatin 20 milliGRAM(s) Oral at bedtime  gabapentin 300 milliGRAM(s) Oral three times a day  lacosamide 150 milliGRAM(s) Oral two times a day  lidocaine 1% Injectable 5 milliLiter(s) Local Injection Once  pantoprazole    Tablet 40 milliGRAM(s) Oral before breakfast  sodium chloride 0.9%. 1000 milliLiter(s) (50 mL/Hr) IV Continuous <Continuous>  tamsulosin 0.4 milliGRAM(s) Oral at bedtime    MEDICATIONS  (PRN):  aluminum hydroxide/magnesium hydroxide/simethicone Suspension 30 milliLiter(s) Oral every 4 hours PRN Dyspepsia  HYDROmorphone  Injectable 0.5 milliGRAM(s) IV Push every 4 hours PRN Severe Pain (7 - 10)  melatonin 3 milliGRAM(s) Oral at bedtime PRN Insomnia  ondansetron Injectable 4 milliGRAM(s) IV Push every 8 hours PRN Nausea and/or Vomiting      Vital Signs Last 24 Hrs  T(C): 36.7 (04 Jun 2024 18:31), Max: 36.7 (04 Jun 2024 18:20)  T(F): 98 (04 Jun 2024 18:31), Max: 98.1 (04 Jun 2024 18:20)  HR: 74 (04 Jun 2024 18:31) (69 - 78)  BP: 117/79 (04 Jun 2024 18:31) (105/69 - 168/78)  BP(mean): --  RR: 18 (04 Jun 2024 18:31) (18 - 18)  SpO2: 99% (04 Jun 2024 18:31) (98% - 99%)    Parameters below as of 04 Jun 2024 18:31  Patient On (Oxygen Delivery Method): room air        PE  Awake, alert  Anicteric, MMM  RRR  CTAB  Abd soft, NT, ND  No c/c                        11.0   5.87  )-----------( 166      ( 05 Jun 2024 07:12 )             33.9       06-05    142  |  107  |  14  ----------------------------<  95  3.7   |  22  |  0.94    Ca    8.4      05 Jun 2024 07:07  Phos  3.6     06-05  Mg     2.2     06-05    TPro  6.2  /  Alb  3.4  /  TBili  0.2  /  DBili  x   /  AST  16  /  ALT  19  /  AlkPhos  70  06-05      
Name of Patient : TAMI DUNHAM  MRN: [id number]  Date of visit: 06-06-24 @ 17:30      Subjective: Patient seen and examined. No new events except as noted.   Patient seen earlier this AM. Sitting OOB TC  Hgb stable. Resumed Lovenox  Patient reports LUE pain is controlled. Denies numbness/ tingling ot decreased sensation     REVIEW OF SYSTEMS:    CONSTITUTIONAL: Generalized weakness  EYES/ENT: No visual changes;  No vertigo or throat pain   NECK: No pain or stiffness  RESPIRATORY: No cough, wheezing, hemoptysis; No shortness of breath  CARDIOVASCULAR: No chest pain or palpitations  GASTROINTESTINAL: No abdominal or epigastric pain. No nausea, vomiting, or hematemesis; No diarrhea or constipation. No melena or hematochezia.  GENITOURINARY: No dysuria, frequency or hematuria  NEUROLOGICAL: episode of confusion yesterday  SKIN/ MSK: SKIN/ MSK: LUE hematoma - pain controlled; LLE wound vac   All other review of systems is negative unless indicated above.    MEDICATIONS:  MEDICATIONS  (STANDING):  acetaminophen   IVPB .. 1000 milliGRAM(s) IV Intermittent once  atorvastatin 20 milliGRAM(s) Oral at bedtime  enoxaparin Injectable 40 milliGRAM(s) SubCutaneous every 24 hours  gabapentin 300 milliGRAM(s) Oral three times a day  lacosamide 150 milliGRAM(s) Oral two times a day  lidocaine 1% Injectable 5 milliLiter(s) Local Injection Once  pantoprazole    Tablet 40 milliGRAM(s) Oral before breakfast  sodium chloride 0.9%. 1000 milliLiter(s) (50 mL/Hr) IV Continuous <Continuous>  tamsulosin 0.4 milliGRAM(s) Oral at bedtime      PHYSICAL EXAM:  T(C): 36.8 (06-06-24 @ 16:01), Max: 36.9 (06-06-24 @ 07:30)  HR: 68 (06-06-24 @ 16:01) (68 - 91)  BP: 110/79 (06-06-24 @ 16:01) (99/65 - 114/69)  RR: 18 (06-06-24 @ 16:01) (18 - 18)  SpO2: 97% (06-06-24 @ 16:01) (93% - 99%)  Wt(kg): --  I&O's Summary    05 Jun 2024 07:01  -  06 Jun 2024 07:00  --------------------------------------------------------  IN: 0 mL / OUT: 650 mL / NET: -650 mL    06 Jun 2024 07:01  -  06 Jun 2024 17:30  --------------------------------------------------------  IN: 0 mL / OUT: 400 mL / NET: -400 mL          Appearance: Awake, generalized weakness, OOB TC 	  HEENT:  Eyes are open   Lymphatic: No lymphadenopathy grossly   Cardiovascular: Normal   Respiratory: normal effort, clear  Gastrointestinal:  Soft, Non-tender  Skin: No rashes,  warm to touch  Psychiatry:  Mood & affect appropriate  Musculoskeletal: LLE wound vac site dressed; LUE hematoma site with dressing, has ROM, Denies decreased sensation; Mild edema                              10.2   5.56  )-----------( 168      ( 06 Jun 2024 07:39 )             32.5               06-06    142  |  108  |  18  ----------------------------<  94  4.0   |  19<L>  |  1.10    Ca    8.1<L>      06 Jun 2024 07:39  Phos  4.0     06-06  Mg     2.3     06-06    TPro  6.3  /  Alb  3.4  /  TBili  0.2  /  DBili  x   /  AST  14  /  ALT  18  /  AlkPhos  67  06-06           CARDIAC MARKERS ( 05 Jun 2024 07:07 )  x     / x     / 171 U/L / x     / x                  Urinalysis Basic - ( 06 Jun 2024 07:39 )    Color: x / Appearance: x / SG: x / pH: x  Gluc: 94 mg/dL / Ketone: x  / Bili: x / Urobili: x   Blood: x / Protein: x / Nitrite: x   Leuk Esterase: x / RBC: x / WBC x   Sq Epi: x / Non Sq Epi: x / Bacteria: x            06-05-24 @ 07:01  -  06-06-24 @ 07:00  --------------------------------------------------------  IN: 0 mL / OUT: 650 mL / NET: -650 mL    06-06-24 @ 07:01  -  06-06-24 @ 17:30  --------------------------------------------------------  IN: 0 mL / OUT: 400 mL / NET: -400 mL            
Name of Patient : TAMI DUNHAM  MRN: [id number]  Date of visit: 06-05-24 @ 14:46      Subjective: Patient seen and examined. No new events except as noted.   Patient seen earlier this AM. EEG tech placing leads on patient. Patient with reported episode of "confusion" yesterday evening. This AM patient is AxO to name, place, birthday.   Reports LLE discomfort. Hgb stable. Reports LUE discomfort with improvement.   Hgb stable    REVIEW OF SYSTEMS:    CONSTITUTIONAL: Generalized weakness, episode of confusion yesterday  EYES/ENT: No visual changes;  No vertigo or throat pain   NECK: No pain or stiffness  RESPIRATORY: No cough, wheezing, hemoptysis; No shortness of breath  CARDIOVASCULAR: No chest pain or palpitations  GASTROINTESTINAL: No abdominal or epigastric pain. No nausea, vomiting, or hematemesis; No diarrhea or constipation. No melena or hematochezia.  GENITOURINARY: No dysuria, frequency or hematuria  NEUROLOGICAL: episode of confusion yesterday  SKIN/ MSK: SKIN/ MSK: LUE hematoma - pain controlled; LLE wound vac   All other review of systems is negative unless indicated above.    MEDICATIONS:  MEDICATIONS  (STANDING):  acetaminophen   IVPB .. 1000 milliGRAM(s) IV Intermittent once  atorvastatin 20 milliGRAM(s) Oral at bedtime  gabapentin 300 milliGRAM(s) Oral three times a day  lacosamide 150 milliGRAM(s) Oral two times a day  lidocaine 1% Injectable 5 milliLiter(s) Local Injection Once  pantoprazole    Tablet 40 milliGRAM(s) Oral before breakfast  sodium chloride 0.9%. 1000 milliLiter(s) (50 mL/Hr) IV Continuous <Continuous>  tamsulosin 0.4 milliGRAM(s) Oral at bedtime      PHYSICAL EXAM:  T(C): 36.4 (06-05-24 @ 10:54), Max: 36.7 (06-04-24 @ 18:20)  HR: 64 (06-05-24 @ 10:54) (64 - 78)  BP: 103/69 (06-05-24 @ 10:54) (103/69 - 168/78)  RR: 18 (06-05-24 @ 10:54) (18 - 18)  SpO2: 98% (06-05-24 @ 10:54) (98% - 99%)  Wt(kg): --  I&O's Summary    04 Jun 2024 07:01  -  05 Jun 2024 07:00  --------------------------------------------------------  IN: 0 mL / OUT: 250 mL / NET: -250 mL      Height (cm): 190.5 (06-04 @ 18:31)  Weight (kg): 106 (06-04 @ 18:31)  BMI (kg/m2): 29.2 (06-04 @ 18:31)  BSA (m2): 2.34 (06-04 @ 18:31)    earance: Awake, generalized weakness, lying down in bed 	  HEENT:  Eyes are open; EEG leads being placed   Lymphatic: No lymphadenopathy grossly   Cardiovascular: Normal   Respiratory: normal effort, clear  Gastrointestinal:  Soft, Non-tender  Skin: No rashes,  warm to touch  Psychiatry:  Mood & affect appropriate  Musculoskeletal: LLE wound vac site dressed; LUE hematoma site with dressing, has ROM, Denies decreased sensation; Mild edema            06-04-24 @ 07:01  -  06-05-24 @ 07:00  --------------------------------------------------------  IN: 0 mL / OUT: 250 mL / NET: -250 mL                                11.0   5.87  )-----------( 166      ( 05 Jun 2024 07:12 )             33.9               06-05    142  |  107  |  14  ----------------------------<  95  3.7   |  22  |  0.94    Ca    8.4      05 Jun 2024 07:07  Phos  3.6     06-05  Mg     2.2     06-05    TPro  6.2  /  Alb  3.4  /  TBili  0.2  /  DBili  x   /  AST  16  /  ALT  19  /  AlkPhos  70  06-05    PT/INR - ( 03 Jun 2024 20:22 )   PT: 11.2 sec;   INR: 1.02 ratio         PTT - ( 03 Jun 2024 20:22 )  PTT:30.5 sec       CARDIAC MARKERS ( 05 Jun 2024 07:07 )  x     / x     / 171 U/L / x     / x      CARDIAC MARKERS ( 03 Jun 2024 20:22 )  x     / x     / 222 U/L / x     / x                  Urinalysis Basic - ( 05 Jun 2024 07:07 )    Color: x / Appearance: x / SG: x / pH: x  Gluc: 95 mg/dL / Ketone: x  / Bili: x / Urobili: x   Blood: x / Protein: x / Nitrite: x   Leuk Esterase: x / RBC: x / WBC x   Sq Epi: x / Non Sq Epi: x / Bacteria: x      < from: [facility] Duplex Lower Ext Vein Scan, Left (06.04.24 @ 17:01) >  IMPRESSION:  No evidence of left lower extremity deep venous thrombosis.          < end of copied text >    
Name of Patient : TAMI DUNHAM  MRN: [id number]  Date of visit: 06-04-24 @ 12:23      Subjective: Patient seen and examined. No new events except as noted.   Patient seen earlier this AM. Denies numbness/ tingling / decreased sensation to LUE. Hematoma site dressed by hand surgery. Reports pain is controlled.   LLE wound vac malfunctioned this AM. Vascular called and addressed wound vac.     REVIEW OF SYSTEMS:    CONSTITUTIONAL: + Generalized weakness   EYES/ENT: No visual changes;  No vertigo or throat pain   NECK: No pain or stiffness  RESPIRATORY: No cough, wheezing, hemoptysis; No shortness of breath  CARDIOVASCULAR: No chest pain or palpitations  GASTROINTESTINAL: No abdominal or epigastric pain. No nausea, vomiting, or hematemesis; No diarrhea or constipation. No melena or hematochezia.  GENITOURINARY: No dysuria, frequency or hematuria  NEUROLOGICAL: No numbness or weakness  SKIN/ MSK: LUE hematoma - pain controlled; LLE wound vac site   All other review of systems is negative unless indicated above.    MEDICATIONS:  MEDICATIONS  (STANDING):  acetaminophen   IVPB .. 1000 milliGRAM(s) IV Intermittent once  atorvastatin 20 milliGRAM(s) Oral at bedtime  gabapentin 300 milliGRAM(s) Oral three times a day  lacosamide 150 milliGRAM(s) Oral two times a day  lidocaine 1% Injectable 5 milliLiter(s) Local Injection Once  tamsulosin 0.4 milliGRAM(s) Oral at bedtime      PHYSICAL EXAM:  T(C): 36.4 (06-04-24 @ 09:55), Max: 36.6 (06-03-24 @ 16:40)  HR: 73 (06-04-24 @ 09:55) (64 - 103)  BP: 105/69 (06-04-24 @ 09:55) (100/69 - 117/88)  RR: 18 (06-04-24 @ 09:55) (18 - 18)  SpO2: 98% (06-04-24 @ 09:55) (96% - 100%)  Wt(kg): --  I&O's Summary    Height (cm): 190.5 (06-03 @ 16:40)  Weight (kg): 93.4 (06-03 @ 16:40)  BMI (kg/m2): 25.7 (06-03 @ 16:40)  BSA (m2): 2.22 (06-03 @ 16:40)    Appearance: Awake, generalized weakness, lying down in bed 	  HEENT:  Eyes are open   Lymphatic: No lymphadenopathy grossly   Cardiovascular: Normal   Respiratory: normal effort, clear  Gastrointestinal:  Soft, Non-tender  Skin: No rashes,  warm to touch  Psychiatry:  Mood & affect appropriate  Musculoskeletal: LLE wound vac site dressed; LUE hematoma site with dressing, has ROM, Denies decreased sensation; Mild edema                                11.7   6.26  )-----------( 177      ( 04 Jun 2024 06:51 )             36.4               06-04    142  |  103  |  14  ----------------------------<  83  3.5   |  24  |  0.95    Ca    8.8      04 Jun 2024 06:51  Phos  4.5     06-04  Mg     2.3     06-04    TPro  6.5  /  Alb  3.7  /  TBili  0.5  /  DBili  x   /  AST  18  /  ALT  22  /  AlkPhos  78  06-04    PT/INR - ( 03 Jun 2024 20:22 )   PT: 11.2 sec;   INR: 1.02 ratio         PTT - ( 03 Jun 2024 20:22 )  PTT:30.5 sec       CARDIAC MARKERS ( 03 Jun 2024 20:22 )  x     / x     / 222 U/L / x     / x                  Urinalysis Basic - ( 04 Jun 2024 06:51 )    Color: x / Appearance: x / SG: x / pH: x  Gluc: 83 mg/dL / Ketone: x  / Bili: x / Urobili: x   Blood: x / Protein: x / Nitrite: x   Leuk Esterase: x / RBC: x / WBC x   Sq Epi: x / Non Sq Epi: x / Bacteria: x            < from: CT Angio Upper Extremity w/ IV Cont, Left (06.03.24 @ 20:02) >    ACC: [id number] EXAM:  CT ANGIO UPR EXT (W)AW IC LT   ORDERED BY: WOODY VICTORIA     PROCEDURE DATE:  06/03/2024          INTERPRETATION:  CLINICAL INFORMATION: Left elbow pain. Fall. Evaluate   for active extravasation.    COMPARISON: None.    PROCEDURE:  CTA of the left upper extremity was performed. 3-D reconstructions are   performed.125 mL of Omnipaque 350 was administered intravenously without   complication and 25 mL was discarded.    FINDINGS:  The left subclavian, axillary, brachial and proximal radial and ulnar   arteries are patent. Poor opacification of the arteries in the distal   forearm and hand. There is a 9.1 x 4.1 cm hematoma in the soft tissues of   the posterior forearm with scattered punctate hyperdense foci (304, 118,  119 and 122), suspicious for contrast extravasation.    IMPRESSION:  Left forearm soft tissue hematoma with small foci of contrast   extravasation.    Findings were discussed with Dr. Victoria at 8:37 PM on 6/3/2024 by Dr. Olivia    --- End of Report ---           AKIL OLIVIA MD; Resident Radiologist  This document has been electronically signed.  FAMILIA NEGRETE MD; Attending Radiologist  This document has been electronically signed. Joshua  3 2024  8:42PM    < end of copied text >        < from: CT Head No Cont (06.03.24 @ 19:57) >  IMPRESSION:    No CT evidence of acute intracranial hemorrhage, edema, or mass effect.    < end of copied text >    
Neurology   S: patient seen.  in bed doing okay       Medications: MEDICATIONS  (STANDING):  acetaminophen   IVPB .. 1000 milliGRAM(s) IV Intermittent once  atorvastatin 20 milliGRAM(s) Oral at bedtime  enoxaparin Injectable 40 milliGRAM(s) SubCutaneous every 24 hours  gabapentin 300 milliGRAM(s) Oral three times a day  lacosamide 150 milliGRAM(s) Oral two times a day  lidocaine 1% Injectable 5 milliLiter(s) Local Injection Once  pantoprazole    Tablet 40 milliGRAM(s) Oral before breakfast  sodium chloride 0.9%. 1000 milliLiter(s) (50 mL/Hr) IV Continuous <Continuous>  tamsulosin 0.4 milliGRAM(s) Oral at bedtime    MEDICATIONS  (PRN):  acetaminophen     Tablet .. 650 milliGRAM(s) Oral every 6 hours PRN Temp greater or equal to 38C (100.4F), Mild Pain (1 - 3)  aluminum hydroxide/magnesium hydroxide/simethicone Suspension 30 milliLiter(s) Oral every 4 hours PRN Dyspepsia  HYDROmorphone  Injectable 0.5 milliGRAM(s) IV Push every 4 hours PRN Severe Pain (7 - 10)  melatonin 3 milliGRAM(s) Oral at bedtime PRN Insomnia  ondansetron Injectable 4 milliGRAM(s) IV Push every 8 hours PRN Nausea and/or Vomiting       Vitals:  Vital Signs Last 24 Hrs  T(C): 36.5 (07 Jun 2024 07:35), Max: 36.8 (06 Jun 2024 16:01)  T(F): 97.7 (07 Jun 2024 07:35), Max: 98.2 (06 Jun 2024 16:01)  HR: 66 (07 Jun 2024 07:35) (66 - 70)  BP: 110/71 (07 Jun 2024 07:35) (103/69 - 110/79)  BP(mean): --  RR: 18 (07 Jun 2024 07:35) (18 - 18)  SpO2: 95% (07 Jun 2024 07:35) (95% - 97%)    Parameters below as of 07 Jun 2024 07:35  Patient On (Oxygen Delivery Method): room air              General Exam:   General Appearance: Appropriately dressed and in no acute distress       Head: Normocephalic, atraumatic and no dysmorphic features  Ear, Nose, and Throat: Moist mucous membranes  CVS: S1S2+  Resp: No SOB, no wheeze or rhonchi  GI: soft NT/ND  Extremities:  LLE wound from prior hematoma. LUE hematoma   Skin: bruieses noted      Neurological Exam:  Mental Status: Awake, alert and oriented x 3, minimal WFD/aphasai.  Able to follow simple and complex verbal commands. Able to name and repeat. fluent speech. No obvious aphasia or dysarthria noted.   Cranial Nerves: PERRL, EOMI, VFFC, sensation V1-V3 intact,  no obvious facial asymmetry, equal elevation of palate, scm/trap 5/5, tongue is midline on protrusion. no obvious papilledema on fundoscopic exam. hearing is grossly intact.   Motor: Normal bulk, tone and strength throughout. Fine finger movements were intact and symmetric. no tremors or drift noted.    Sensation: Intact to light touch and pinprick throughout. no right/left confusion. no extinction to tactile on DSS.   Reflexes: 1+ throughout at biceps, brachioradialis, triceps, patellars and ankles bilaterally and equal. No clonus. R toe and L toe were both downgoing.  Coordination: No dysmetria on FNF    Gait:  walker     Data/Labs/Imaging which I personally reviewed.       LABS:                          10.7   6.12  )-----------( 165      ( 07 Jun 2024 07:23 )             33.3     06-07    141  |  107  |  18  ----------------------------<  80  3.8   |  21<L>  |  0.91    Ca    8.5      07 Jun 2024 07:23  Phos  2.9     06-07  Mg     2.4     06-07    TPro  6.1  /  Alb  3.4  /  TBili  0.3  /  DBili  x   /  AST  14  /  ALT  19  /  AlkPhos  63  06-07    LIVER FUNCTIONS - ( 07 Jun 2024 07:23 )  Alb: 3.4 g/dL / Pro: 6.1 g/dL / ALK PHOS: 63 U/L / ALT: 19 U/L / AST: 14 U/L / GGT: x             Urinalysis Basic - ( 07 Jun 2024 07:23 )    Color: x / Appearance: x / SG: x / pH: x  Gluc: 80 mg/dL / Ketone: x  / Bili: x / Urobili: x   Blood: x / Protein: x / Nitrite: x   Leuk Esterase: x / RBC: x / WBC x   Sq Epi: x / Non Sq Epi: x / Bacteria: x            < from: CT Head No Cont (06.03.24 @ 19:57) >    ACC: [id number] EXAM:  CT BRAIN   ORDERED BY: WOODY MARIE     PROCEDURE DATE:  06/03/2024          INTERPRETATION:  CLINICAL INFORMATION: Fall.    TECHNIQUE: Noncontrast axial CT images were acquired through the head.   Two-dimensional sagittal and coronal reformats were generated.    COMPARISON STUDY: CT head 5/2/2024. Prior contrast enhanced brain MRI   study from 4/26/2023.    FINDINGS:    There is no CT evidence of acute intracranial hemorrhage, extra-axial   collection, vasogenic edema, mass effect, midline shift, central   herniation, or hydrocephalus.    Stable calcifications in the central alexis, bilateral temporal lobes, and   right frontal lobe.    There is age appropriate cerebral volume loss with patchy white matter   hypoattenuation likely related to microvascular disease.    Mucosal thickening/debris in the bilateral maxillary and ethmoid sinuses.   The mastoid air cells and middle ear cavities are clear.    The soft tissues of the scalp are unremarkable. The calvarium isintact.    IMPRESSION:    No CT evidence of acute intracranial hemorrhage, edema, or mass effect.    --- End of Report ---           AKIL OLIVIA MD; Resident Radiologist  This document has been electronically signed.  SERENA MILTON MD; Attending Radiologist  This document has been electronically signed. Joshua  3 2024  8:32PM    < end of copied text >      
Neurology   S: patient seen.  resting in bed demetraluther pola        Medications: MEDICATIONS  (STANDING):  acetaminophen   IVPB .. 1000 milliGRAM(s) IV Intermittent once  aspirin  chewable 81 milliGRAM(s) Oral daily  atorvastatin 20 milliGRAM(s) Oral at bedtime  chlorhexidine 2% Cloths 1 Application(s) Topical daily  enoxaparin Injectable 40 milliGRAM(s) SubCutaneous every 24 hours  gabapentin 300 milliGRAM(s) Oral three times a day  lacosamide 150 milliGRAM(s) Oral two times a day  lidocaine 1% Injectable 5 milliLiter(s) Local Injection Once  pantoprazole    Tablet 40 milliGRAM(s) Oral before breakfast  polyethylene glycol 3350 17 Gram(s) Oral daily  senna 2 Tablet(s) Oral at bedtime  sodium chloride 0.9%. 1000 milliLiter(s) (50 mL/Hr) IV Continuous <Continuous>  tamsulosin 0.4 milliGRAM(s) Oral at bedtime    MEDICATIONS  (PRN):  acetaminophen     Tablet .. 650 milliGRAM(s) Oral every 6 hours PRN Temp greater or equal to 38C (100.4F), Mild Pain (1 - 3)  aluminum hydroxide/magnesium hydroxide/simethicone Suspension 30 milliLiter(s) Oral every 4 hours PRN Dyspepsia  melatonin 3 milliGRAM(s) Oral at bedtime PRN Insomnia  ondansetron Injectable 4 milliGRAM(s) IV Push every 8 hours PRN Nausea and/or Vomiting       Vitals:  Vital Signs Last 24 Hrs  T(C): 36.8 (11 Jun 2024 16:02), Max: 36.8 (11 Jun 2024 16:02)  T(F): 98.3 (11 Jun 2024 16:02), Max: 98.3 (11 Jun 2024 16:02)  HR: 81 (11 Jun 2024 16:02) (62 - 81)  BP: 128/80 (11 Jun 2024 16:02) (91/62 - 128/80)  BP(mean): --  RR: 18 (11 Jun 2024 16:02) (18 - 18)  SpO2: 97% (11 Jun 2024 16:02) (97% - 100%)    Parameters below as of 11 Jun 2024 09:22  Patient On (Oxygen Delivery Method): room air                 General Exam:   General Appearance: Appropriately dressed and in no acute distress       Head: Normocephalic, atraumatic and no dysmorphic features  Ear, Nose, and Throat: Moist mucous membranes  CVS: S1S2+  Resp: No SOB, no wheeze or rhonchi  GI: soft NT/ND  Extremities:  LLE wound from prior hematoma. LUE hematoma   Skin: bruieses noted      Neurological Exam:  Mental Status: Awake, alert and oriented x 3, minimal WFD/[place]ai.  Able to follow simple and complex verbal commands. Able to name and repeat. fluent speech. No obvious aphasia or dysarthria noted.   Cranial Nerves: PERRL, EOMI, VFFC, sensation V1-V3 intact,  no obvious facial asymmetry, equal elevation of palate, scm/trap 5/5, tongue is midline on protrusion. no obvious papilledema on fundoscopic exam. hearing is grossly intact.   Motor: Normal bulk, tone and strength throughout. Fine finger movements were intact and symmetric. no tremors or drift noted.    Sensation: Intact to light touch and pinprick throughout. no right/left confusion. no extinction to tactile on DSS.   Reflexes: 1+ throughout at biceps, brachioradialis, triceps, patellars and ankles bilaterally and equal. No clonus. R toe and L toe were both downgoing.  Coordination: No dysmetria on FNF    Gait:  walker     Data/Labs/Imaging which I personally reviewed.       LABS:                          11.5   5.53  )-----------( 165      ( 11 Jun 2024 11:43 )             35.1     06-11    142  |  106  |  14  ----------------------------<  94  4.1   |  24  |  0.89    Ca    9.2      11 Jun 2024 11:43          Urinalysis Basic - ( 11 Jun 2024 11:43 )    Color: x / Appearance: x / SG: x / pH: x  Gluc: 94 mg/dL / Ketone: x  / Bili: x / Urobili: x   Blood: x / Protein: x / Nitrite: x   Leuk Esterase: x / RBC: x / WBC x   Sq Epi: x / Non Sq Epi: x / Bacteria: x            < from: CT Head No Cont (06.03.24 @ 19:57) >    ACC: [id number] EXAM:  CT BRAIN   ORDERED BY: WOODY MARIE     PROCEDURE DATE:  06/03/2024          INTERPRETATION:  CLINICAL INFORMATION: Fall.    TECHNIQUE: Noncontrast axial CT images were acquired through the head.   Two-dimensional sagittal and coronal reformats were generated.    COMPARISON STUDY: CT head 5/2/2024. Prior contrast enhanced brain MRI   study from 4/26/2023.    FINDINGS:    There is no CT evidence of acute intracranial hemorrhage, extra-axial   collection, vasogenic edema, mass effect, midline shift, central   herniation, or hydrocephalus.    Stable calcifications in the central alexis, bilateral temporal lobes, and   right frontal lobe.    There is age appropriate cerebral volume loss with patchy white matter   hypoattenuation likely related to microvascular disease.    Mucosal thickening/debris in the bilateral maxillary and ethmoid sinuses.   The mastoid air cells and middle ear cavities are clear.    The soft tissues of the scalp are unremarkable. The calvarium isintact.    IMPRESSION:    No CT evidence of acute intracranial hemorrhage, edema, or mass effect.    --- End of Report ---           AKIL OLIVIA MD; Resident Radiologist  This document has been electronically signed.  SERENA MILTON MD; Attending Radiologist  This document has been electronically signed. Joshua  3 2024  8:32PM    < end of copied text >      
Plastic Surgery Progress Note (pg [facility]: [id number], [place]: [phone])    SUBJECTIVE  The patient was seen and examined. No acute events overnight. Pain controlled, hand and forearm pain/pressure improved from yesterday morning. Afebrile w/ stable vitals.     OBJECTIVE  ___________________________________________________  VITAL SIGNS / I&O's   Vital Signs Last 24 Hrs  T(C): 36.7 (04 Jun 2024 18:31), Max: 36.7 (04 Jun 2024 18:20)  T(F): 98 (04 Jun 2024 18:31), Max: 98.1 (04 Jun 2024 18:20)  HR: 74 (04 Jun 2024 18:31) (69 - 78)  BP: 117/79 (04 Jun 2024 18:31) (105/69 - 168/78)  BP(mean): --  RR: 18 (04 Jun 2024 18:31) (18 - 18)  SpO2: 99% (04 Jun 2024 18:31) (98% - 99%)    Parameters below as of 04 Jun 2024 18:31  Patient On (Oxygen Delivery Method): room air          04 Jun 2024 07:01  -  05 Jun 2024 07:00  --------------------------------------------------------  IN:  Total IN: 0 mL    OUT:    Voided (mL): 250 mL  Total OUT: 250 mL    Total NET: -250 mL        ___________________________________________________  PHYSICAL EXAM    -- CONSTITUTIONAL: NAD, lying in bed  -- NEURO: Awake, alert  -- HEENT: NC/AT, mucous membranes moist  -- NECK: Soft, no asymmetry  -- PULM: Non-labored respirations, equal chest rise bilaterally  -- ABDOMEN: Nondistended  -- EXTREMITIES: Tense hematoma and ecchymosis on L dorsal forearm.  Volar compartments are soft and there is NO tenderness at passive flexion and extension of hand and wrist. 5/5 hand , wrist flexion/extension. L hand sensation intact in M/R/U distributions. Cap refill normal in fingertips in L hand.   -- PSYCH: Affect normal, A&Ox3    ___________________________________________________  LABS                        10.9   6.41  )-----------( 169      ( 04 Jun 2024 16:24 )             33.4     04 Jun 2024 06:51    142    |  103    |  14     ----------------------------<  83     3.5     |  24     |  0.95     Ca    8.8        04 Jun 2024 06:51  Phos  4.5       04 Jun 2024 06:51  Mg     2.3       04 Jun 2024 06:51    TPro  6.5    /  Alb  3.7    /  TBili  0.5    /  DBili  x      /  AST  18     /  ALT  22     /  AlkPhos  78     04 Jun 2024 06:51    PT/INR - ( 03 Jun 2024 20:22 )   PT: 11.2 sec;   INR: 1.02 ratio         PTT - ( 03 Jun 2024 20:22 )  PTT:30.5 sec  CAPILLARY BLOOD GLUCOSE        CARDIAC MARKERS ( 03 Jun 2024 20:22 )  x     / x     / 222 U/L / x     / x          Urinalysis Basic - ( 04 Jun 2024 06:51 )    Color: x / Appearance: x / SG: x / pH: x  Gluc: 83 mg/dL / Ketone: x  / Bili: x / Urobili: x   Blood: x / Protein: x / Nitrite: x   Leuk Esterase: x / RBC: x / WBC x   Sq Epi: x / Non Sq Epi: x / Bacteria: x      ___________________________________________________  MICRO  Recent Cultures:    ___________________________________________________  MEDICATIONS  (STANDING):  acetaminophen   IVPB .. 1000 milliGRAM(s) IV Intermittent once  atorvastatin 20 milliGRAM(s) Oral at bedtime  gabapentin 300 milliGRAM(s) Oral three times a day  lacosamide 150 milliGRAM(s) Oral two times a day  lidocaine 1% Injectable 5 milliLiter(s) Local Injection Once  pantoprazole    Tablet 40 milliGRAM(s) Oral before breakfast  sodium chloride 0.9%. 1000 milliLiter(s) (50 mL/Hr) IV Continuous <Continuous>  tamsulosin 0.4 milliGRAM(s) Oral at bedtime    MEDICATIONS  (PRN):  aluminum hydroxide/magnesium hydroxide/simethicone Suspension 30 milliLiter(s) Oral every 4 hours PRN Dyspepsia  HYDROmorphone  Injectable 0.5 milliGRAM(s) IV Push every 4 hours PRN Severe Pain (7 - 10)  melatonin 3 milliGRAM(s) Oral at bedtime PRN Insomnia  ondansetron Injectable 4 milliGRAM(s) IV Push every 8 hours PRN Nausea and/or Vomiting  
Plastic Surgery Progress Note (pg [facility]: [id number], [place]: [phone])    SUBJECTIVE  The patient was seen and examined. Denies numbness/tingling in L hand. Pain generally controlled. Afebrile w/ stable vitals.     OBJECTIVE  ___________________________________________________  VITAL SIGNS / I&O's   Vital Signs Last 24 Hrs  T(C): 36.4 (04 Jun 2024 03:50), Max: 36.6 (03 Jun 2024 16:40)  T(F): 97.5 (04 Jun 2024 03:50), Max: 97.9 (03 Jun 2024 16:40)  HR: 65 (04 Jun 2024 03:50) (64 - 103)  BP: 100/69 (04 Jun 2024 03:50) (100/69 - 117/88)  BP(mean): 94 (03 Jun 2024 18:31) (94 - 103)  RR: 18 (04 Jun 2024 03:50) (18 - 18)  SpO2: 98% (04 Jun 2024 03:50) (96% - 100%)    Parameters below as of 04 Jun 2024 03:50  Patient On (Oxygen Delivery Method): room air          ___________________________________________________  PHYSICAL EXAM    -- CONSTITUTIONAL: NAD, lying in bed  -- NEURO: Awake, alert  -- HEENT: NC/AT, mucous membranes moist  -- NECK: Soft, no asymmetry  -- PULM: Non-labored respirations, equal chest rise bilaterally  -- ABDOMEN: Nondistended  -- EXTREMITIES: Tense hematoma and ecchymosis on L dorsal forearm and skin blisters.  Volar compartments are soft and there is NO tenderness at passive flexion and extension of hand and wrist. L hand sensation intact in M/R/U distributions. Cap refill normal in fingertips in L hand  -- PSYCH: Affect normal, A&Ox3    ___________________________________________________  LABS                        13.1   8.36  )-----------( 227      ( 03 Jun 2024 18:34 )             40.3     03 Jun 2024 18:34    143    |  104    |  14     ----------------------------<  100    4.0     |  24     |  1.06     Ca    10.0       03 Jun 2024 18:34    TPro  7.5    /  Alb  4.3    /  TBili  0.4    /  DBili  x      /  AST  25     /  ALT  29     /  AlkPhos  84     03 Jun 2024 18:34    PT/INR - ( 03 Jun 2024 20:22 )   PT: 11.2 sec;   INR: 1.02 ratio         PTT - ( 03 Jun 2024 20:22 )  PTT:30.5 sec  CAPILLARY BLOOD GLUCOSE        CARDIAC MARKERS ( 03 Jun 2024 20:22 )  x     / x     / 222 U/L / x     / x          Urinalysis Basic - ( 03 Jun 2024 18:34 )    Color: x / Appearance: x / SG: x / pH: x  Gluc: 100 mg/dL / Ketone: x  / Bili: x / Urobili: x   Blood: x / Protein: x / Nitrite: x   Leuk Esterase: x / RBC: x / WBC x   Sq Epi: x / Non Sq Epi: x / Bacteria: x      ___________________________________________________  MICRO  Recent Cultures:    ___________________________________________________  MEDICATIONS  (STANDING):  acetaminophen   IVPB .. 1000 milliGRAM(s) IV Intermittent once  atorvastatin 20 milliGRAM(s) Oral at bedtime  gabapentin 300 milliGRAM(s) Oral three times a day  lacosamide 150 milliGRAM(s) Oral two times a day  lidocaine 1% Injectable 5 milliLiter(s) Local Injection Once  tamsulosin 0.4 milliGRAM(s) Oral at bedtime    MEDICATIONS  (PRN):  aluminum hydroxide/magnesium hydroxide/simethicone Suspension 30 milliLiter(s) Oral every 4 hours PRN Dyspepsia  melatonin 3 milliGRAM(s) Oral at bedtime PRN Insomnia  ondansetron Injectable 4 milliGRAM(s) IV Push every 8 hours PRN Nausea and/or Vomiting

## 2024-06-11 NOTE — PROGRESS NOTE ADULT - PROVIDER SPECIALTY LIST ADULT
Hand Surgery
Internal Medicine
Neurology
Neurology
Hand Surgery
Internal Medicine
Neurology
Neurology
Hand Surgery
Heme/Onc
Internal Medicine
Internal Medicine
Neurology
Neurology
Vascular Surgery

## 2024-07-11 NOTE — DISCHARGE NOTE NURSING/CASE MANAGEMENT/SOCIAL WORK - NSDPLANG ASIS_GEN_ALL_CORE
[facility] ENT  Dysphagia Assessment      Pt Seen for Initial Assessment     Diagnosis/Indication:   Primary: Dysphagia, unspecified    Patient Active Problem List   Diagnosis    ED (erectile dysfunction)    MG (myasthenia gravis) (HCC)    Dissection, vertebral artery (HCC)    Dyslipidemia    Rash    Osteoarthritis of both knees       Previous SLP Evaluations: NA    SUBJECTIVE:   PMHx of dysphagia w/ initial onset post R-sided ACDF (C3-4) 6/11/24; seen by ENT w/ evidence of continued edema of R posterior pharynx and referred to SLP for instrumental assessment of swallow. Characterized dysphagia as sensation of bolus \"sticking\" + effortful swallow; denied choking or s/s of aspiration. However, significantly improved since initial post-operative time period. Endorsed mild dysphonia characterized by roughness; also improved post-op. Greatest concern related to globus characterized as sensation of \"pocket of mucus\" that requires frequent throat clearing; endorsed throat clear is productive. Denied dyspnea.     Weight:   Wt Readings from Last 3 Encounters:   07/08/24 96.2 kg (212 lb)   06/07/24 97.8 kg (215 lb 9.6 oz)   03/27/24 96.6 kg (213 lb)     OBJECTIVE:   Dysphagia Assessment    -Pt referred for FEES per ENT however, pt preference to avoid endoscopy d/t previous adverse reactions. Educated pt to recommendation for instrumental evaluation to fully assess swallow physiology, including determination of therapeutic goals + POC. Counseled to options including FEES vs MBS; would like to pursue MBS at this time. Will request order via ENT.   -Additional education provided regarding presence of persistent post-operative edema and typical timeline of >6-8 weeks for reduction; expressed understanding and reinforcement significant improvement from initial 1-2 weeks.   -Regarding globus + sensation of mucus, counseled to possibility of edema contributing to symptoms and suspect improvement as pt experiences additional  No    [] Yes  [] No [] Yes  [] No    [] Yes  [] No [] Yes  [] No    [] Yes  [] No [] Yes  [] No    [] Yes  [] No     Pharyngeal Pooling  Able to clear?   [] Yes [] No    [] Yes [] No [] Yes  [] No    [] Yes  [] No [] Yes  [] No    [] Yes  [] No [] Yes  [] No    [] Yes  [] No [] Yes  [] No    [] Yes  [] No [] Yes  [] No    [] Yes  [] No   Aspiration    Response    Able to clear? [] Yes [] No    []Silent []Cough    [] Yes  [] No [] Yes   [] No    []Silent []Cough    [] Yes  [] No [] Yes   [] No    []Silent []Cough    [] Yes  [] No [] Yes  [] No    []Silent []Cough    [] Yes  [] No [] Yes  [] No    []Silent []Cough    [] Yes  [] No [] Yes  [] No    []Silent []Cough    [] Yes  [] No       Bolus Backflow at UES  Able to clear? [] Yes  [] No      [] Yes  [] No [] Yes  [] No      [] Yes  [] No [] Yes  [] No      [] Yes  [] No [] Yes  [] No      [] Yes  [] No [] Yes  [] No      [] Yes  [] No [] Yes  [] No      [] Yes  [] No     COMPENSATORY TECHNIQUES FOR INCREASED SAFETY:  Postural Changes : [] Yes  [] No   Comments:    INCREASED RISK OF ASPIRATION DUE TO:  Poor Oral control and/or copious oral residue : [] Yes  [] No   Redcued laryngopharyngeal sensation : [] Yes  [] No  Premature spillage of bolus : [] Yes  [] No  Inability to clear material from valleculae, pharynx, pyriform sinus or endolarynx : [] Yes  [] No   Backflow to Pharynx : [] Yes  [] No     Other: ***    Endoscope was removed without incident, no adverse reactions.    PENETRATION-ASPIRATION SCALE (PAS)  [] 8 Material enters the airway, passes below the vocal folds, and no effort is made to eject  [] 7 Material enters the airway, passes below the vocal folds, and is not ejected from the trachea despite effort  [] 6 Material enters the airway, passes below the vocal folds, and is ejected into the larynx or out of the airway  [] 5 Material enters the airway, contacts the vocal folds, and is not ejected into the airway  [] 4 Material enters the airway, contacts  No

## 2024-07-17 ENCOUNTER — APPOINTMENT (OUTPATIENT)
Dept: VASCULAR SURGERY | Facility: CLINIC | Age: 54
End: 2024-07-17
Payer: MEDICAID

## 2024-07-17 VITALS
BODY MASS INDEX: 26.11 KG/M2 | OXYGEN SATURATION: 96 % | WEIGHT: 210 LBS | SYSTOLIC BLOOD PRESSURE: 119 MMHG | HEART RATE: 90 BPM | DIASTOLIC BLOOD PRESSURE: 81 MMHG | HEIGHT: 75 IN

## 2024-07-17 DIAGNOSIS — R21 RASH AND OTHER NONSPECIFIC SKIN ERUPTION: ICD-10-CM

## 2024-07-17 PROCEDURE — 99024 POSTOP FOLLOW-UP VISIT: CPT

## 2024-07-17 RX ORDER — LISDEXAMFETAMINE DIMESYLATE 10 MG/1
CAPSULE ORAL
Refills: 0 | Status: ACTIVE | COMMUNITY

## 2024-07-18 ENCOUNTER — APPOINTMENT (OUTPATIENT)
Dept: MRI IMAGING | Facility: IMAGING CENTER | Age: 54
End: 2024-07-18

## 2024-07-20 ENCOUNTER — APPOINTMENT (OUTPATIENT)
Dept: MRI IMAGING | Facility: IMAGING CENTER | Age: 54
End: 2024-07-20

## 2024-07-20 ENCOUNTER — OUTPATIENT (OUTPATIENT)
Dept: OUTPATIENT SERVICES | Facility: HOSPITAL | Age: 54
LOS: 1 days | End: 2024-07-20
Payer: MEDICAID

## 2024-07-20 DIAGNOSIS — Z98.890 OTHER SPECIFIED POSTPROCEDURAL STATES: Chronic | ICD-10-CM

## 2024-07-20 DIAGNOSIS — M62.462 CONTRACTURE OF MUSCLE, LEFT LOWER LEG: ICD-10-CM

## 2024-07-20 PROCEDURE — 72197 MRI PELVIS W/O & W/DYE: CPT

## 2024-07-20 PROCEDURE — A9585: CPT

## 2024-07-20 PROCEDURE — 74183 MRI ABD W/O CNTR FLWD CNTR: CPT | Mod: 26

## 2024-07-20 PROCEDURE — 72197 MRI PELVIS W/O & W/DYE: CPT | Mod: 26

## 2024-07-20 PROCEDURE — 74183 MRI ABD W/O CNTR FLWD CNTR: CPT

## 2024-07-22 ENCOUNTER — APPOINTMENT (OUTPATIENT)
Dept: PULMONOLOGY | Facility: CLINIC | Age: 54
End: 2024-07-22
Payer: MEDICAID

## 2024-07-22 ENCOUNTER — NON-APPOINTMENT (OUTPATIENT)
Age: 54
End: 2024-07-22

## 2024-07-22 VITALS
OXYGEN SATURATION: 97 % | TEMPERATURE: 97.4 F | HEIGHT: 75 IN | RESPIRATION RATE: 16 BRPM | SYSTOLIC BLOOD PRESSURE: 118 MMHG | BODY MASS INDEX: 24.87 KG/M2 | HEART RATE: 79 BPM | WEIGHT: 200 LBS | DIASTOLIC BLOOD PRESSURE: 72 MMHG

## 2024-07-22 DIAGNOSIS — Z87.898 PERSONAL HISTORY OF OTHER SPECIFIED CONDITIONS: ICD-10-CM

## 2024-07-22 DIAGNOSIS — Z80.0 FAMILY HISTORY OF MALIGNANT NEOPLASM OF DIGESTIVE ORGANS: ICD-10-CM

## 2024-07-22 DIAGNOSIS — Z87.39 PERSONAL HISTORY OF OTHER DISEASES OF THE MUSCULOSKELETAL SYSTEM AND CONNECTIVE TISSUE: ICD-10-CM

## 2024-07-22 DIAGNOSIS — Z83.438 FAMILY HISTORY OF OTHER DISORDER OF LIPOPROTEIN METABOLISM AND OTHER LIPIDEMIA: ICD-10-CM

## 2024-07-22 DIAGNOSIS — I78.1 NEVUS, NON-NEOPLASTIC: ICD-10-CM

## 2024-07-22 DIAGNOSIS — Z72.820 SLEEP DEPRIVATION: ICD-10-CM

## 2024-07-22 DIAGNOSIS — Z80.3 FAMILY HISTORY OF MALIGNANT NEOPLASM OF BREAST: ICD-10-CM

## 2024-07-22 DIAGNOSIS — R94.2 ABNORMAL RESULTS OF PULMONARY FUNCTION STUDIES: ICD-10-CM

## 2024-07-22 DIAGNOSIS — Z80.42 FAMILY HISTORY OF MALIGNANT NEOPLASM OF PROSTATE: ICD-10-CM

## 2024-07-22 DIAGNOSIS — M79.A9: ICD-10-CM

## 2024-07-22 DIAGNOSIS — R93.89 ABNORMAL FINDINGS ON DIAGNOSTIC IMAGING OF OTHER SPECIFIED BODY STRUCTURES: ICD-10-CM

## 2024-07-22 DIAGNOSIS — Z86.73 PERSONAL HISTORY OF TRANSIENT ISCHEMIC ATTACK (TIA), AND CEREBRAL INFARCTION W/OUT RESIDUAL DEFICITS: ICD-10-CM

## 2024-07-22 DIAGNOSIS — Z80.9 FAMILY HISTORY OF MALIGNANT NEOPLASM, UNSPECIFIED: ICD-10-CM

## 2024-07-22 DIAGNOSIS — Z80.8 FAMILY HISTORY OF MALIGNANT NEOPLASM OF OTHER ORGANS OR SYSTEMS: ICD-10-CM

## 2024-07-22 DIAGNOSIS — Z82.49 FAMILY HISTORY OF ISCHEMIC HEART DISEASE AND OTHER DISEASES OF THE CIRCULATORY SYSTEM: ICD-10-CM

## 2024-07-22 DIAGNOSIS — L30.8 OTHER SPECIFIED DERMATITIS: ICD-10-CM

## 2024-07-22 PROCEDURE — 94618 PULMONARY STRESS TESTING: CPT

## 2024-07-22 PROCEDURE — ZZZZZ: CPT

## 2024-07-22 PROCEDURE — 95012 NITRIC OXIDE EXP GAS DETER: CPT

## 2024-07-22 PROCEDURE — 71046 X-RAY EXAM CHEST 2 VIEWS: CPT

## 2024-07-22 PROCEDURE — 94727 GAS DIL/WSHOT DETER LNG VOL: CPT

## 2024-07-22 PROCEDURE — 99214 OFFICE O/P EST MOD 30 MIN: CPT | Mod: 25

## 2024-07-22 PROCEDURE — 94060 EVALUATION OF WHEEZING: CPT

## 2024-07-22 PROCEDURE — 94729 DIFFUSING CAPACITY: CPT

## 2024-07-22 NOTE — END OF VISIT
[Time Spent: ___ minutes] : I have spent [unfilled] minutes of time on the encounter. [id number]: || ||00\01||False; [id number]: || ||00\01||False; [id number]: || ||00\01||False;

## 2024-07-22 NOTE — REASON FOR VISIT
[Initial] : an initial visit [TextBox_44] : abnormal CT Chest sub cm pulmonary nodules c/w benign abnormality, abnormal PFTs (elevated Feno), poor sleep ?GURMEET

## 2024-07-22 NOTE — ADDENDUM
[FreeTextEntry1] : Documented by Nam Rodríguez acting as a scribe for Dr. Abdulaziz Hull on 07/22/2024 All medical record entries made by the Scribe were at my, Dr. Abdulaziz Hull's, direction and personally dictated by me on 07/22/2024 . I have reviewed the chart and agree that the record accurately reflects my personal performance of the history, physical exam, assessment and plan. I have also personally directed, reviewed, and agree with the discharge instructions.

## 2024-07-22 NOTE — PHYSICAL EXAM
[No Acute Distress] : no acute distress [Normal Oropharynx] : normal oropharynx [III] : Mallampati Class: III [Normal Appearance] : normal appearance [No Neck Mass] : no neck mass [Normal Rate/Rhythm] : normal rate/rhythm [Normal S1, S2] : normal s1, s2 [No Murmurs] : no murmurs [No Resp Distress] : no resp distress [Clear to Auscultation Bilaterally] : clear to auscultation bilaterally [Benign] : benign [Normal Gait] : normal gait [No Clubbing] : no clubbing [No Cyanosis] : no cyanosis [No Edema] : no edema [FROM] : FROM [Normal Color/ Pigmentation] : normal color/ pigmentation [No Focal Deficits] : no focal deficits [Oriented x3] : oriented x3 [Normal Affect] : normal affect [Kyphosis] : kyphosis [TextBox_68] : I:E: 1:3; 1+ LE edema L greater than R

## 2024-07-22 NOTE — HISTORY OF PRESENT ILLNESS
[TextBox_4] : Mr. 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 His chief complaint is  -he notes vision is stable -he notes poor quality of sleep -he notes sleeping for 9-10 hours -he notes CTA 5/15/2023 nodule in right -he notes chronic fatigue -he notes allergy issues in his 20's but subsided -he denies allergy Sx currently - PND, itchy eyes/ears -he denies heartburn/reflux -he notes his memory and concentration are poor -he notes ability to fall asleep while watching a boring TV show and while in a moving car -he notes weight is stable - mentions could improve diet -he notes exercising (kettle ball)   -he denies any headaches, nausea, emesis, fever, chills, sweats, chest pain, chest pressure, coughing, wheezing, palpitations, diarrhea, constipation, dysphagia, vertigo, arthralgias, myalgias, leg swelling, itchy eyes, itchy ears, heartburn, reflux, or sour taste in the mouth.

## 2024-07-22 NOTE — ASSESSMENT
[FreeTextEntry1] : Mr. DUNHAM is a 53 year old male with a history of asteototic dermatitis s/p fasciotomy (2023), compartment syndrome, multiple CVA (2/2022, 8/2022/ 11/2022), seizures (12/2022), foot drop, HLD, and Telangiectasia who now comes to the office for an initial pulmonary evaluation for abnormal CT Chest sub cm pulmonary nodules c/w benign abnormality, abnormal PFTs (elevated Feno), poor sleep ?GURMEET    His shortness of breath is multifactorial due to:   -poor mechanics of breathing -out of shape -pulmonary disease   -abnormal PFTs (mild restrictive dysfunction; 16% improvement with BD at mid-low lung volumes; elevated Feno of 61; 6 minute walk unable to do beyond 3 minuutes due to leg weakness; rest of PFTs were normal) - no role for BD in this situation -cardiac disease    -doubtful    Problem 1: abnormal CT Chest (ddx inflammatory, post-infectious; less likely malignancy) -complete regular CT chest without contrast - if resolved no need for CT's - if progressed consider alternative action  -CAT scans are the only radiological modality to identify abnormalities within the lungs with regards to nodules/masses/lymph nodes. Risks, benefits were reviewed in detail. The guidelines for abnormalities include follow up CT scans at various intervals which could range from 6 weeks to 1 year intervals. If there is a change for the worse then consideration for a biopsy will be considered if the patient is a candidate. Second opinion evaluation with a thoracic surgeon or an interventional radiologist could be offered.    Problem 2: Abnormal PFTs -abnormal PFTs (mild restrictive dysfunction; 16% improvement with BD at mid-low lung volumes; elevated Feno of 61; 6 minute walk unable to do beyond 3 minuutes due to leg weakness; rest of PFTs were normal) - no role for BD in this situation    Problem 3: poor sleep ?OSAS (elevated Mallampati class, poor quality sleep, snoring, neck size >16) -complete home sleep study -Sleep apnea is associated with adverse clinical consequences which can affect most organ systems. Cardiovascular disease risk includes arrhythmias, atrial fibrillation, hypertension, coronary artery disease, and stroke. Metabolic disorders include diabetes type 2, non-alcoholic fatty liver disease. Mood disorder especially depression; and cognitive decline especially in the elderly. Associations with chronic reflux/German's esophagus some but not all inclusive. -Reasons include arousal consistent with hypopnea; respiratory events most prominent in REM sleep or supine position; therefore sleep staging and body position are important for accurate diagnosis and estimation of AHI.      Problem 4: poor breathing mechanics -Recommended Michelle Fishman breathing technique -Proper breathing techniques were reviewed with an emphasis on exhalation. Patient was instructed to breathe in for 1 second and out for 4 seconds. The patient was encouraged to not talk while walking.     Problem 5: out of shape -Weight loss, exercise, and diet control were discussed and are highly encouraged. Treatment options are given such as aqua therapy, and contacting a nutritionist. Recommended to use the elliptical, stationary bike, less use of the treadmill.     Problem 6: health maintenance -recommended yearly flu shot after October 15, 2023 -recommended strep pneumonia vaccines: Prevnar-20 vaccine -recommended early intervention for Upper Respiratory Infections (URIs) -recommended regular osteoporosis evaluations -recommended early dermatological evaluations -recommended after the age of 50 to the age of 70, colonoscopy every 5 years     F/P in 6-8 weeks. He is encouraged to call with any changes, concerns, or questions

## 2024-07-22 NOTE — PROCEDURE
[FreeTextEntry1] : CXR (07/22/2024) reveals a normal sized heart; no evidence of infiltrate or effusion; loop recorder on left--a normal appearing chest radiograph  Sleep study (3/10/15) revealed there is no evidence of obstructive sleep apnea  Full PFT reveals mild restrictive dysfunction; FEV1 was 3.50L which is 75% of predicted, with a 16% increase with BD at mid-low volumes; low-normal lung volumes; normal diffusion at 23.6, which is 84% of predicted; normal flow volume loop. PFTs were performed to evaluate for   6 minute walk test reveals a low saturation of 96% with no evidence of dyspnea or fatigue; walked 130.4 meters  FENO was 61; a normal value being less than 25 Fractional exhaled nitric oxide (FENO) is regarded as a simple, noninvasive method for assessing eosinophilic airway inflammation. Produced by a variety of cells within the lung, nitric oxide (NO) concentrations are generally low in healthy individuals. However, high concentrations of NO appear to be involved in nonspecific host defense mechanisms and chronic inflammatory diseases such as asthma. The American Thoracic Society (ATS) therefore has recommended using FENO to aid in the diagnosis and monitoring of eosinophilic airway inflammation and asthma, and for identifying steroid responsive individuals whose chronic respiratory symptoms may be caused by airway inflammation.

## 2024-07-25 PROBLEM — R21 RASH: Status: ACTIVE | Noted: 2024-07-25

## 2024-07-26 ENCOUNTER — NON-APPOINTMENT (OUTPATIENT)
Age: 54
End: 2024-07-26

## 2024-07-29 ENCOUNTER — APPOINTMENT (OUTPATIENT)
Dept: UROLOGY | Facility: CLINIC | Age: 54
End: 2024-07-29
Payer: MEDICAID

## 2024-07-29 ENCOUNTER — OUTPATIENT (OUTPATIENT)
Dept: OUTPATIENT SERVICES | Facility: HOSPITAL | Age: 54
LOS: 1 days | End: 2024-07-29
Payer: MEDICAID

## 2024-07-29 DIAGNOSIS — Z98.890 OTHER SPECIFIED POSTPROCEDURAL STATES: Chronic | ICD-10-CM

## 2024-07-29 DIAGNOSIS — R35.0 FREQUENCY OF MICTURITION: ICD-10-CM

## 2024-07-29 PROCEDURE — 52000 CYSTOURETHROSCOPY: CPT

## 2024-07-30 DIAGNOSIS — R31.0 GROSS HEMATURIA: ICD-10-CM

## 2024-07-31 ENCOUNTER — OUTPATIENT (OUTPATIENT)
Dept: OUTPATIENT SERVICES | Facility: HOSPITAL | Age: 54
LOS: 1 days | End: 2024-07-31
Payer: MEDICAID

## 2024-07-31 ENCOUNTER — APPOINTMENT (OUTPATIENT)
Dept: CT IMAGING | Facility: IMAGING CENTER | Age: 54
End: 2024-07-31
Payer: MEDICAID

## 2024-07-31 DIAGNOSIS — Z98.890 OTHER SPECIFIED POSTPROCEDURAL STATES: Chronic | ICD-10-CM

## 2024-07-31 DIAGNOSIS — Z00.8 ENCOUNTER FOR OTHER GENERAL EXAMINATION: ICD-10-CM

## 2024-07-31 DIAGNOSIS — R93.89 ABNORMAL FINDINGS ON DIAGNOSTIC IMAGING OF OTHER SPECIFIED BODY STRUCTURES: ICD-10-CM

## 2024-07-31 PROCEDURE — 71250 CT THORAX DX C-: CPT | Mod: 26

## 2024-07-31 PROCEDURE — 71250 CT THORAX DX C-: CPT

## 2024-08-14 NOTE — PHYSICAL THERAPY INITIAL EVALUATION ADULT - STANDING BALANCE: STATIC
Initiate Treatment: triamcinolone acetonide 0.1 % topical cream BID\\nQuantity: 454.0 g  Days Supply: 30\\nSig: Apply a small amount twice daily to affected area for eczema on feet as needed for itching.
Detail Level: Zone
Render In Strict Bullet Format?: No
good balance

## 2024-08-24 ENCOUNTER — OUTPATIENT (OUTPATIENT)
Dept: OUTPATIENT SERVICES | Facility: HOSPITAL | Age: 54
LOS: 1 days | End: 2024-08-24
Payer: MEDICAID

## 2024-08-24 ENCOUNTER — APPOINTMENT (OUTPATIENT)
Dept: MRI IMAGING | Facility: IMAGING CENTER | Age: 54
End: 2024-08-24

## 2024-08-24 DIAGNOSIS — R90.89 OTHER ABNORMAL FINDINGS ON DIAGNOSTIC IMAGING OF CENTRAL NERVOUS SYSTEM: ICD-10-CM

## 2024-08-24 DIAGNOSIS — Z98.890 OTHER SPECIFIED POSTPROCEDURAL STATES: Chronic | ICD-10-CM

## 2024-08-24 PROCEDURE — A9585: CPT

## 2024-08-24 PROCEDURE — 70553 MRI BRAIN STEM W/O & W/DYE: CPT

## 2024-08-24 PROCEDURE — 70553 MRI BRAIN STEM W/O & W/DYE: CPT | Mod: 26

## 2024-08-27 ENCOUNTER — APPOINTMENT (OUTPATIENT)
Dept: SLEEP CENTER | Facility: CLINIC | Age: 54
End: 2024-08-27
Payer: MEDICAID

## 2024-08-27 ENCOUNTER — OUTPATIENT (OUTPATIENT)
Dept: OUTPATIENT SERVICES | Facility: HOSPITAL | Age: 54
LOS: 1 days | End: 2024-08-27
Payer: MEDICAID

## 2024-08-27 DIAGNOSIS — Z98.890 OTHER SPECIFIED POSTPROCEDURAL STATES: Chronic | ICD-10-CM

## 2024-08-27 PROCEDURE — 95800 SLP STDY UNATTENDED: CPT | Mod: 26

## 2024-08-27 PROCEDURE — 95800 SLP STDY UNATTENDED: CPT

## 2024-09-10 ENCOUNTER — TRANSCRIPTION ENCOUNTER (OUTPATIENT)
Age: 54
End: 2024-09-10

## 2024-09-10 DIAGNOSIS — G47.33 OBSTRUCTIVE SLEEP APNEA (ADULT) (PEDIATRIC): ICD-10-CM

## 2024-09-12 ENCOUNTER — TRANSCRIPTION ENCOUNTER (OUTPATIENT)
Age: 54
End: 2024-09-12

## 2024-09-18 ENCOUNTER — APPOINTMENT (OUTPATIENT)
Dept: VASCULAR SURGERY | Facility: CLINIC | Age: 54
End: 2024-09-18
Payer: MEDICAID

## 2024-09-18 VITALS
WEIGHT: 210 LBS | SYSTOLIC BLOOD PRESSURE: 121 MMHG | DIASTOLIC BLOOD PRESSURE: 76 MMHG | OXYGEN SATURATION: 97 % | HEART RATE: 78 BPM | TEMPERATURE: 97.7 F | BODY MASS INDEX: 26.11 KG/M2 | HEIGHT: 75 IN

## 2024-09-18 PROCEDURE — 99213 OFFICE O/P EST LOW 20 MIN: CPT

## 2024-09-18 PROCEDURE — 93971 EXTREMITY STUDY: CPT

## 2024-09-18 NOTE — ASSESSMENT
[FreeTextEntry1] : TAMI DUNHAM is a 54 year old male presents for follow up.   >s/p left thigh hematoma evac, subsequent I&D - now fully healed.  - follow up as needed  > left leg swelling - recent change from ac to ppx dose - no acute dvt/svt left leg - cont ac as prescribed by heme

## 2024-09-18 NOTE — PHYSICAL EXAM
[Calm] : calm [2+] : left 2+ [Ankle Swelling (On Exam)] : present [Ankle Swelling On The Left] : moderate [de-identified] : Well-appearing  [de-identified] : EOMI, anicteric  [de-identified] : soft, nt, nd  [de-identified] : left thigh wound with healthy granulation within wound, surrounding skin w/ contact dermatitis.  [de-identified] : A&Ox4

## 2024-09-18 NOTE — HISTORY OF PRESENT ILLNESS
[FreeTextEntry1] : 4/5/2024 - left thigh hematoma evacuation 4/19/2024 - I&D of left thigh hematoma and seroma, wound vac placement  5/23/2024 - Pt presents for follow up. Has been doing wound vac placement on left thigh wound. Denies pain. [de-identified] : 7/17/2024 - Presents for follow up. Since last visit, was hospitalized at [facility] due to fall. Has been discharged. Left thigh wound no longer requiring wound vac but recently concern for contact dermatitis secondary to dressing. Today, denies left thigh pain. No itching at wound site.   9/18/2024 - Pt presents for follow up. Incision in left thigh has healed. He is currently on lovenox 40 mg subcutaneous injections daily. Patient reports chronic left leg swelling but slightly more in the recent past.

## 2024-09-23 NOTE — PROGRESS NOTE ADULT - SUBJECTIVE AND OBJECTIVE BOX
Name of Patient : TAMI DUNHAM  MRN: [id number]  Date of visit: 05-05-23 @ 13:50      Subjective: Patient seen and examined. No new events except as noted.   Patient seen earlier this AM. Lying down in bed  On Heparin gtt for DVT     REVIEW OF SYSTEMS:    CONSTITUTIONAL: Generalized weakness   EYES/ENT: No visual changes;  No vertigo or throat pain   NECK: No pain or stiffness  RESPIRATORY: No cough, wheezing, hemoptysis; No shortness of breath  CARDIOVASCULAR: No chest pain or palpitations  GASTROINTESTINAL: No abdominal or epigastric pain. No nausea, vomiting, or hematemesis; No diarrhea or constipation. No melena or hematochezia.  GENITOURINARY: No dysuria, frequency or hematuria  NEUROLOGICAL: No numbness or weakness  SKIN: LLE Fasciotomy site; Dressing in place   All other review of systems is negative unless indicated above.    MEDICATIONS:  MEDICATIONS  (STANDING):  aspirin enteric coated 81 milliGRAM(s) Oral daily  atorvastatin 40 milliGRAM(s) Oral at bedtime  dextrose 5% + sodium chloride 0.9%. 1000 milliLiter(s) (60 mL/Hr) IV Continuous <Continuous>  dextrose 5%. 1000 milliLiter(s) (50 mL/Hr) IV Continuous <Continuous>  dextrose 5%. 1000 milliLiter(s) (100 mL/Hr) IV Continuous <Continuous>  dextrose 50% Injectable 12.5 Gram(s) IV Push once  dextrose 50% Injectable 25 Gram(s) IV Push once  dextrose 50% Injectable 25 Gram(s) IV Push once  gabapentin 300 milliGRAM(s) Oral three times a day  glucagon  Injectable 1 milliGRAM(s) IntraMuscular once  heparin  Infusion. 1500 Unit(s)/Hr (15 mL/Hr) IV Continuous <Continuous>  lacosamide 100 milliGRAM(s) Oral two times a day  levETIRAcetam 1500 milliGRAM(s) Oral <User Schedule>  multivitamin 1 Tablet(s) Oral daily  pantoprazole    Tablet 40 milliGRAM(s) Oral before breakfast      PHYSICAL EXAM:  T(C): 36.3 (05-05-23 @ 12:34), Max: 36.7 (05-04-23 @ 17:46)  HR: 61 (05-05-23 @ 12:34) (61 - 72)  BP: 125/88 (05-05-23 @ 12:34) (116/73 - 146/92)  RR: 18 (05-05-23 @ 12:34) (17 - 20)  SpO2: 97% (05-05-23 @ 12:34) (94% - 97%)  Wt(kg): --  I&O's Summary    Height (cm): 190.5 (05-04 @ 20:27)  Weight (kg): 102 (05-04 @ 20:27)  BMI (kg/m2): 28.1 (05-04 @ 20:27)  BSA (m2): 2.31 (05-04 @ 20:27)    Appearance: Normal	  HEENT:  PERRLA   Lymphatic: No lymphadenopathy   Cardiovascular: Normal S1 S2, no JVD  Respiratory: normal effort , clear  Gastrointestinal:  Soft, Non-tender  Skin: No rashes,  warm to touch  Psychiatry:  Mood & affect appropriate  Musculuskeletal: No edema      All labs, Imaging and EKGs personally reviewed                        Name of Patient : TAMI DUNHAM  MRN: [id number]  Date of visit: 05-05-23 @ 13:50      Subjective: Patient seen and examined. No new events except as noted.   Patient seen earlier this AM. Lying down in bed  On Heparin gtt for DVT. Planned to transition to 1.5 dose of lovenox for AC.       REVIEW OF SYSTEMS:    CONSTITUTIONAL: Generalized weakness   EYES/ENT: No visual changes;  No vertigo or throat pain   NECK: No pain or stiffness  RESPIRATORY: No cough, wheezing, hemoptysis; No shortness of breath  CARDIOVASCULAR: No chest pain or palpitations  GASTROINTESTINAL: No abdominal or epigastric pain. No nausea, vomiting, or hematemesis; No diarrhea or constipation. No melena or hematochezia.  GENITOURINARY: No dysuria, frequency or hematuria  NEUROLOGICAL: No numbness or weakness  SKIN: LLE Fasciotomy site; Dressing in place   All other review of systems is negative unless indicated above.    MEDICATIONS:  MEDICATIONS  (STANDING):  aspirin enteric coated 81 milliGRAM(s) Oral daily  atorvastatin 40 milliGRAM(s) Oral at bedtime  dextrose 5% + sodium chloride 0.9%. 1000 milliLiter(s) (60 mL/Hr) IV Continuous <Continuous>  dextrose 5%. 1000 milliLiter(s) (50 mL/Hr) IV Continuous <Continuous>  dextrose 5%. 1000 milliLiter(s) (100 mL/Hr) IV Continuous <Continuous>  dextrose 50% Injectable 12.5 Gram(s) IV Push once  dextrose 50% Injectable 25 Gram(s) IV Push once  dextrose 50% Injectable 25 Gram(s) IV Push once  gabapentin 300 milliGRAM(s) Oral three times a day  glucagon  Injectable 1 milliGRAM(s) IntraMuscular once  heparin  Infusion. 1500 Unit(s)/Hr (15 mL/Hr) IV Continuous <Continuous>  lacosamide 100 milliGRAM(s) Oral two times a day  levETIRAcetam 1500 milliGRAM(s) Oral <User Schedule>  multivitamin 1 Tablet(s) Oral daily  pantoprazole    Tablet 40 milliGRAM(s) Oral before breakfast      PHYSICAL EXAM:  T(C): 36.3 (05-05-23 @ 12:34), Max: 36.7 (05-04-23 @ 17:46)  HR: 61 (05-05-23 @ 12:34) (61 - 72)  BP: 125/88 (05-05-23 @ 12:34) (116/73 - 146/92)  RR: 18 (05-05-23 @ 12:34) (17 - 20)  SpO2: 97% (05-05-23 @ 12:34) (94% - 97%)  Wt(kg): --  I&O's Summary    Height (cm): 190.5 (05-04 @ 20:27)  Weight (kg): 102 (05-04 @ 20:27)  BMI (kg/m2): 28.1 (05-04 @ 20:27)  BSA (m2): 2.31 (05-04 @ 20:27)    Appearance: Awake, Sitting up in bed   HEENT: Eyes are open   Lymphatic: No lymphadenopathy   Cardiovascular: Normal S1 S2   Respiratory: normal effort , clear  Gastrointestinal:  Soft, Non-tender  Skin: No rashes,  warm to touch  Psychiatry:  Mood & affect appropriate  Musculoskeletal: LLE Fasciotomy site with dressing in place                             12.4   6.01  )-----------( 190      ( 05 May 2023 09:04 )             36.7               05-05    141  |  107  |  14  ----------------------------<  97  3.8   |  25  |  0.86    Ca    8.7      05 May 2023 06:51    TPro  5.7<L>  /  Alb  x   /  TBili  x   /  DBili  x   /  AST  x   /  ALT  x   /  AlkPhos  x   05-05    PT/INR - ( 05 May 2023 06:50 )   PT: 12.1 sec;   INR: 1.05 ratio         PTT - ( 05 May 2023 09:04 )  PTT:101.0 sec                                 < from: US Duplex Venous Lower Ext Complete, Bilateral (05.03.23 @ 12:13) >  IMPRESSION:  Acute deep venous thrombosis in the right lower extremity: below the knee.    Echogenic linear thrombus in the left popliteal vein, probably sequela of   prior DVT.    Findings were discussed with CLEMENTE Hernandez on 5/3/2023 at 1:25 PM by Dr. Meeks with read back confirmation.    < end of copied text >          < from: CT Head No Cont (05.04.23 @ 22:52) >  MISCELLANEOUS:  None.    IMPRESSION:  No significant interval change 1/18/2023. Atrophy out of   proportion for age. Microvascular ischemic changes are suggested in the   periventricular and subcortical white matter. No new pathology.    < end of copied text >           Time-based billing (NON-critical care)

## 2024-09-26 ENCOUNTER — RX RENEWAL (OUTPATIENT)
Age: 54
End: 2024-09-26

## 2024-09-26 NOTE — ASU PATIENT PROFILE, ADULT - ABILITY TO HEAR (WITH HEARING AID OR HEARING APPLIANCE IF NORMALLY USED):
Airway  Date/Time: 9/26/2024 7:32 AM  Urgency: elective    Airway not difficult    Staffing  Performed: SRNA   Authorized by: Naren Dumont MD    Performed by: Leonardo Vicente  Patient location during procedure: OR    Indications and Patient Condition  Indications for airway management: anesthesia  Spontaneous ventilation: present  Sedation level: deep  Preoxygenated: yes  Patient position: sniffing  MILS not maintained throughout  Mask difficulty assessment: 2 - vent by mask + OA or adjuvant +/- NMBA    Final Airway Details  Final airway type: endotracheal airway      Successful airway: ETT - double lumen left  Cuffed: yes   Successful intubation technique: direct laryngoscopy  Facilitating devices/methods: intubating stylet and anterior pressure/BURP  Endotracheal tube insertion site: oral  Blade: Satya  Blade size: #4  ETT DL size (fr): 37  Cormack-Lehane Classification: grade I - full view of glottis  Placement verified by: chest auscultation, bronchoscopy and capnometry   Measured from: lips  ETT to lips (cm): 31  Number of attempts at approach: 1           Adequate: hears normal conversation without difficulty

## 2024-10-01 ENCOUNTER — APPOINTMENT (OUTPATIENT)
Dept: PULMONOLOGY | Facility: CLINIC | Age: 54
End: 2024-10-01
Payer: MEDICAID

## 2024-10-01 DIAGNOSIS — Z72.820 SLEEP DEPRIVATION: ICD-10-CM

## 2024-10-01 DIAGNOSIS — R53.82 CHRONIC FATIGUE, UNSPECIFIED: ICD-10-CM

## 2024-10-01 DIAGNOSIS — R94.2 ABNORMAL RESULTS OF PULMONARY FUNCTION STUDIES: ICD-10-CM

## 2024-10-01 DIAGNOSIS — R09.81 NASAL CONGESTION: ICD-10-CM

## 2024-10-01 DIAGNOSIS — R93.89 ABNORMAL FINDINGS ON DIAGNOSTIC IMAGING OF OTHER SPECIFIED BODY STRUCTURES: ICD-10-CM

## 2024-10-01 PROCEDURE — 99214 OFFICE O/P EST MOD 30 MIN: CPT

## 2024-10-01 NOTE — REASON FOR VISIT
[Home] : at home, [unfilled] , at the time of the visit. [Medical Office: ([facility])___] : at the medical office located in  [Patient] : the patient [Follow-Up] : a follow-up visit

## 2024-10-01 NOTE — REASON FOR VISIT
[Home] : at home, [unfilled] , at the time of the visit. [Medical Office: ([facility])___] : at the medical office located in  [Patient] : the patient [Follow-Up] : a follow-up visit

## 2024-10-02 PROBLEM — R09.81 NASAL CONGESTION: Status: ACTIVE | Noted: 2024-10-02

## 2024-10-02 NOTE — DISCUSSION/SUMMARY
[FreeTextEntry1] : - 7/31/2024 CHEST CT (COMPARISON: Chest CT 1/18/2023): Lungs/Airways/Pleura: The central airways are patent. No pleural effusion. Stable small nodules measuring up to 4 mm, for example in the anterior right upper lobe 2:56, subpleural left lower lobe 2:58 and along the left major fissure 2:85. No new nodules.  Mediastinum/Lymph nodes: No thoracic adenopathy.  Heart and Vessels: 3.8 cm mid ascending aorta. The heart is normal in size. Qualitatively mild coronary artery calcification. No pericardial effusion.  Upper Abdomen: Stable subcentimeter hepatic hypodensity/probable cyst (2:97).  Osseous structures and Soft Tissues: No aggressive bone lesions. Cardiac loop recorder.  IMPRESSION: Stable nodules measuring up to 4 mm since November 2022. No new nodules.  - 8/27/2024 HST via [facility] NEGATIVE FOR GURMEET.

## 2024-10-02 NOTE — HISTORY OF PRESENT ILLNESS
[TextBox_4] : Mr. DUNHAM is a 54-year-old male with a history of asteototic dermatitis s/p fasciotomy (2023), compartment syndrome, multiple CVA (2/2022, 8/2022/ 11/2022), seizures (12/2022), foot drop, HLD, and Telangiectasia presenting via video for a follow up visit.   7/22/2024 NPA VISIT WITH DR. ADKINS: -he notes vision is stable -he notes poor quality of sleep -he notes sleeping for 9-10 hours -he notes CTA 5/15/2023 nodule in right -he notes chronic fatigue -he notes allergy issues in his 20's but subsided -he denies allergy Sx currently - PND, itchy eyes/ears -he denies heartburn/reflux -he notes his memory and concentration are poor -he notes ability to fall asleep while watching a boring TV show and while in a moving car -he notes weight is stable - mentions could improve diet -he notes exercising (kettle ball)  10/2/2024 FOLLOW UP VIDEO VISIT:   Patient admits to ongoing fatigue.  He states neurologist switched his gabapentin routine 2 months ago to see if that would help. He was taking 300 mg 3 times daily but then switched to 300 mg every morning and 600 mg nightly.  He states he continues to feel fatigued throughout the day despite change.   Patient admits to congestion a.m. that resolves as the day goes on.  Patient denies fever/chills, decreased appetite, cough, wheezing, shortness of breath at rest or exertion, or chest tightness at this time.

## 2024-10-02 NOTE — ASSESSMENT
[FreeTextEntry1] : Mr. DUNHAM is a 54-year-old male with a history of asteototic dermatitis s/p fasciotomy (2023), compartment syndrome, multiple CVA (2/2022, 8/2022/ 11/2022), seizures (12/2022), foot drop, HLD, and Telangiectasia presenting via video for a follow up visit.   1. Abnormal CT Chest (ddx inflammatory, post-infectious; less likely malignancy): - Follow up Chest CT due 7/2025.  2. Abnormal PFTs: -abnormal PFTs (mild restrictive dysfunction; 16% improvement with BD at mid-low lung volumes; elevated Feno of 61; 6 minute walk unable to do beyond 3 minuutes due to leg weakness; rest of PFTs were normal) - no role for BD in this situation  3. Poor Sleep/Chronic Fatigue:  - s/p HST which resulted NEGATIVE for GURMEET. - Discussed other etiologies of anemia or thyroid dysfunction and advised patient to follow up with PCP.   4. Nasal congestion - specifically in AM: - Add Flonase BID to see if benefit.   Patient to follow up with Dr. Hull as scheduled. Patient to call with further questions and concerns. Patient verbalizes understanding of care plan and is agreeable.

## 2024-10-02 NOTE — DISCUSSION/SUMMARY
[FreeTextEntry1] : - 7/31/2024 CHEST CT (COMPARISON: Chest CT 1/18/2023): Lungs/Airways/Pleura: The central airways are patent. No pleural effusion. Stable small nodules measuring up to 4 mm, for example in the anterior right upper lobe 2:56, subpleural left lower lobe 2:58 and along the left major fissure 2:85. No new nodules.  Mediastinum/Lymph nodes: No thoracic adenopathy.  Heart and Vessels: 3.8 cm mid ascending aorta. The heart is normal in size. Qualitatively mild coronary artery calcification. No pericardial effusion.  Upper Abdomen: Stable subcentimeter hepatic hypodensity/probable cyst (2:97).  Osseous structures and Soft Tissues: No aggressive bone lesions. Cardiac loop recorder.  IMPRESSION: Stable nodules measuring up to 4 mm since November 2022. No new nodules.  - 8/27/2024 HST via [facility] NEGATIVE FOR GURMEET.

## 2024-10-02 NOTE — PHYSICAL EXAM
[No Acute Distress] : no acute distress [No Deformities] : no deformities [No Resp Distress] : no resp distress [Oriented x3] : oriented x3 [Normal Affect] : normal affect

## 2024-10-10 ENCOUNTER — NON-APPOINTMENT (OUTPATIENT)
Age: 54
End: 2024-10-10

## 2024-10-10 ENCOUNTER — APPOINTMENT (OUTPATIENT)
Dept: OPHTHALMOLOGY | Facility: CLINIC | Age: 54
End: 2024-10-10
Payer: MEDICAID

## 2024-10-10 PROCEDURE — 92133 CPTRZD OPH DX IMG PST SGM ON: CPT

## 2024-10-10 PROCEDURE — 92283 EXTND COLOR VISION XM: CPT

## 2024-10-10 PROCEDURE — 92004 COMPRE OPH EXAM NEW PT 1/>: CPT

## 2024-10-11 NOTE — ED ADULT NURSE NOTE - PAIN: PRESENCE, MLM
- Nephrology consulted and following for RRT needs; has been requiring vasopressor support to facilitate this   - Discussed our shared concern that we have not seen improvement in his blood pressure thus far   denies pain/discomfort (Rating = 0)

## 2024-10-11 NOTE — PROVIDER CONTACT NOTE (OTHER) - NAME OF MD/NP/PA/DO NOTIFIED:
ACP Roland Hernandez,
Maryann Bishop
Hayder Washington; Shari Rios
Annette Lopez ACP
[FreeTextEntry2] : a f/u surgical consultation concerning a 2.1 cm left thyroid lobe nodule, prior FNAB AUS, B-III but benign on molecular testing.
[FreeTextEntry1] : PCP and referrer is Fady Castro MD

## 2024-10-15 ENCOUNTER — APPOINTMENT (OUTPATIENT)
Dept: OPHTHALMOLOGY | Facility: CLINIC | Age: 54
End: 2024-10-15

## 2024-10-26 NOTE — ED PROVIDER NOTE - DATE/TIME 1
"Subjective: Pt agreeable to therapeutic plan of care. Pt had stress test earlier and off BP meds, RN asked to hold until afternoon to get meds in her due to incr BP    Objective:     Bed mobility - N/A or Not attempted.  Transfers - SBA  Ambulation - 80 feet SBA with rollator    Vitals: Hypertensive BP seated-164/63 and after amb BP-186/70    Pain: 0 VAS     Education: Provided education on the importance of mobility in the acute care setting, Verbal/Tactile Cues, and Gait Training    Assessment: Grace Renee presents with functional mobility impairments which indicate the need for skilled intervention. Tolerating session today without incident. Pt did very well amb in room using her rollator at slowed neeraj with no LOB noted. Plans on dc home with HH. Will continue to follow and progress as tolerated.     Plan/Recommendations:   If medically appropriate, Low Intensity Therapy recommended post-acute care - This is recommended as therapy feels this patient would require 2-3 visits per week. OP or HH would be the best option depending on patient's home bound status. Consider, if the patient has other  \"skilled\" needs such as wounds, IV antibiotics, etc. Combined with \"low intensity\" could also equate to a SNF. If patient is medically complex, consider LTAC. Pt requires no DME at discharge.     Pt desires Home with Home Health at discharge. Pt cooperative; agreeable to therapeutic recommendations and plan of care.         Basic Mobility 6-click:  Rollin = Total, A lot = 2, A little = 3; 4 = None  Supine>Sit:   1 = Total, A lot = 2, A little = 3; 4 = None   Sit>Stand with arms:  1 = Total, A lot = 2, A little = 3; 4 = None  Bed>Chair:   1 = Total, A lot = 2, A little = 3; 4 = None  Ambulate in room:  1 = Total, A lot = 2, A little = 3; 4 = None  3-5 Steps with railin = Total, A lot = 2, A little = 3; 4 = None  Score: 21    Post-Tx Position: Up in Chair and Call light and personal items within " reach  PPE: gloves    Therapy Charges for Today       Code Description Service Date Service Provider Modifiers Qty    15012203169 HC GAIT TRAINING EA 15 MIN 10/26/2024 Emilia Paulino, PTA GP 1    14174554424 HC PT NEUROMUSC RE EDUCATION EA 15 MIN 10/26/2024 Emilia Paulino, PTA GP 1            15-Sep-2022 22:02

## 2025-01-24 ENCOUNTER — EMERGENCY (EMERGENCY)
Facility: HOSPITAL | Age: 55
LOS: 1 days | Discharge: HOME | End: 2025-01-24
Attending: EMERGENCY MEDICINE
Payer: MEDICAID

## 2025-01-24 VITALS
SYSTOLIC BLOOD PRESSURE: 121 MMHG | DIASTOLIC BLOOD PRESSURE: 81 MMHG | OXYGEN SATURATION: 96 % | RESPIRATION RATE: 18 BRPM | TEMPERATURE: 98 F | HEART RATE: 76 BPM

## 2025-01-24 VITALS
DIASTOLIC BLOOD PRESSURE: 81 MMHG | OXYGEN SATURATION: 95 % | SYSTOLIC BLOOD PRESSURE: 119 MMHG | WEIGHT: 184.97 LBS | RESPIRATION RATE: 20 BRPM | TEMPERATURE: 98 F | HEIGHT: 69 IN | HEART RATE: 83 BPM

## 2025-01-24 DIAGNOSIS — Z98.890 OTHER SPECIFIED POSTPROCEDURAL STATES: Chronic | ICD-10-CM

## 2025-01-24 LAB
ADD ON TEST-SPECIMEN IN LAB: SIGNIFICANT CHANGE UP
ALBUMIN SERPL ELPH-MCNC: 4.2 G/DL — SIGNIFICANT CHANGE UP (ref 3.3–5)
ALP SERPL-CCNC: 77 U/L — SIGNIFICANT CHANGE UP (ref 40–120)
ALT FLD-CCNC: 23 U/L — SIGNIFICANT CHANGE UP (ref 10–45)
ANION GAP SERPL CALC-SCNC: 11 MMOL/L — SIGNIFICANT CHANGE UP (ref 5–17)
APPEARANCE UR: CLEAR — SIGNIFICANT CHANGE UP
APTT BLD: 33 SEC — SIGNIFICANT CHANGE UP (ref 24.5–35.6)
AST SERPL-CCNC: 17 U/L — SIGNIFICANT CHANGE UP (ref 10–40)
BACTERIA # UR AUTO: NEGATIVE /HPF — SIGNIFICANT CHANGE UP
BASOPHILS # BLD AUTO: 0.06 K/UL — SIGNIFICANT CHANGE UP (ref 0–0.2)
BASOPHILS NFR BLD AUTO: 0.9 % — SIGNIFICANT CHANGE UP (ref 0–2)
BILIRUB SERPL-MCNC: 0.3 MG/DL — SIGNIFICANT CHANGE UP (ref 0.2–1.2)
BILIRUB UR-MCNC: NEGATIVE — SIGNIFICANT CHANGE UP
BUN SERPL-MCNC: 17 MG/DL — SIGNIFICANT CHANGE UP (ref 7–23)
CALCIUM SERPL-MCNC: 9.2 MG/DL — SIGNIFICANT CHANGE UP (ref 8.4–10.5)
CAST: 0 /LPF — SIGNIFICANT CHANGE UP (ref 0–4)
CHLORIDE SERPL-SCNC: 104 MMOL/L — SIGNIFICANT CHANGE UP (ref 96–108)
CO2 SERPL-SCNC: 25 MMOL/L — SIGNIFICANT CHANGE UP (ref 22–31)
COLOR SPEC: YELLOW — SIGNIFICANT CHANGE UP
CREAT SERPL-MCNC: 0.97 MG/DL — SIGNIFICANT CHANGE UP (ref 0.5–1.3)
DIFF PNL FLD: NEGATIVE — SIGNIFICANT CHANGE UP
EGFR: 93 ML/MIN/1.73M2 — SIGNIFICANT CHANGE UP
EOSINOPHIL # BLD AUTO: 0.35 K/UL — SIGNIFICANT CHANGE UP (ref 0–0.5)
EOSINOPHIL NFR BLD AUTO: 5.4 % — SIGNIFICANT CHANGE UP (ref 0–6)
FLUAV AG NPH QL: SIGNIFICANT CHANGE UP
FLUBV AG NPH QL: SIGNIFICANT CHANGE UP
GLUCOSE SERPL-MCNC: 110 MG/DL — HIGH (ref 70–99)
GLUCOSE UR QL: NEGATIVE MG/DL — SIGNIFICANT CHANGE UP
HCT VFR BLD CALC: 40.6 % — SIGNIFICANT CHANGE UP (ref 39–50)
HGB BLD-MCNC: 13.5 G/DL — SIGNIFICANT CHANGE UP (ref 13–17)
IMM GRANULOCYTES NFR BLD AUTO: 0.5 % — SIGNIFICANT CHANGE UP (ref 0–0.9)
INR BLD: 0.98 RATIO — SIGNIFICANT CHANGE UP (ref 0.85–1.16)
KETONES UR-MCNC: ABNORMAL MG/DL
LEUKOCYTE ESTERASE UR-ACNC: NEGATIVE — SIGNIFICANT CHANGE UP
LYMPHOCYTES # BLD AUTO: 1.15 K/UL — SIGNIFICANT CHANGE UP (ref 1–3.3)
LYMPHOCYTES # BLD AUTO: 17.9 % — SIGNIFICANT CHANGE UP (ref 13–44)
MCHC RBC-ENTMCNC: 29.4 PG — SIGNIFICANT CHANGE UP (ref 27–34)
MCHC RBC-ENTMCNC: 33.3 G/DL — SIGNIFICANT CHANGE UP (ref 32–36)
MCV RBC AUTO: 88.5 FL — SIGNIFICANT CHANGE UP (ref 80–100)
MONOCYTES # BLD AUTO: 0.52 K/UL — SIGNIFICANT CHANGE UP (ref 0–0.9)
MONOCYTES NFR BLD AUTO: 8.1 % — SIGNIFICANT CHANGE UP (ref 2–14)
NEUTROPHILS # BLD AUTO: 4.32 K/UL — SIGNIFICANT CHANGE UP (ref 1.8–7.4)
NEUTROPHILS NFR BLD AUTO: 67.2 % — SIGNIFICANT CHANGE UP (ref 43–77)
NITRITE UR-MCNC: NEGATIVE — SIGNIFICANT CHANGE UP
NRBC # BLD: 0 /100 WBCS — SIGNIFICANT CHANGE UP (ref 0–0)
PH UR: 8 — SIGNIFICANT CHANGE UP (ref 5–8)
PLATELET # BLD AUTO: 185 K/UL — SIGNIFICANT CHANGE UP (ref 150–400)
POTASSIUM SERPL-MCNC: 4.1 MMOL/L — SIGNIFICANT CHANGE UP (ref 3.5–5.3)
POTASSIUM SERPL-SCNC: 4.1 MMOL/L — SIGNIFICANT CHANGE UP (ref 3.5–5.3)
PROT SERPL-MCNC: 7.2 G/DL — SIGNIFICANT CHANGE UP (ref 6–8.3)
PROT UR-MCNC: NEGATIVE MG/DL — SIGNIFICANT CHANGE UP
PROTHROM AB SERPL-ACNC: 11.2 SEC — SIGNIFICANT CHANGE UP (ref 9.9–13.4)
RBC # BLD: 4.59 M/UL — SIGNIFICANT CHANGE UP (ref 4.2–5.8)
RBC # FLD: 12.6 % — SIGNIFICANT CHANGE UP (ref 10.3–14.5)
RBC CASTS # UR COMP ASSIST: 2 /HPF — SIGNIFICANT CHANGE UP (ref 0–4)
RSV RNA NPH QL NAA+NON-PROBE: SIGNIFICANT CHANGE UP
SARS-COV-2 RNA SPEC QL NAA+PROBE: SIGNIFICANT CHANGE UP
SODIUM SERPL-SCNC: 140 MMOL/L — SIGNIFICANT CHANGE UP (ref 135–145)
SP GR SPEC: 1.02 — SIGNIFICANT CHANGE UP (ref 1–1.03)
SQUAMOUS # UR AUTO: 0 /HPF — SIGNIFICANT CHANGE UP (ref 0–5)
TROPONIN T, HIGH SENSITIVITY RESULT: 20 NG/L — SIGNIFICANT CHANGE UP (ref 0–51)
UROBILINOGEN FLD QL: 0.2 MG/DL — SIGNIFICANT CHANGE UP (ref 0.2–1)
WBC # BLD: 6.43 K/UL — SIGNIFICANT CHANGE UP (ref 3.8–10.5)
WBC # FLD AUTO: 6.43 K/UL — SIGNIFICANT CHANGE UP (ref 3.8–10.5)
WBC UR QL: 1 /HPF — SIGNIFICANT CHANGE UP (ref 0–5)

## 2025-01-24 PROCEDURE — 70498 CT ANGIOGRAPHY NECK: CPT | Mod: 26

## 2025-01-24 PROCEDURE — 80053 COMPREHEN METABOLIC PANEL: CPT

## 2025-01-24 PROCEDURE — 87086 URINE CULTURE/COLONY COUNT: CPT

## 2025-01-24 PROCEDURE — 73610 X-RAY EXAM OF ANKLE: CPT | Mod: 26,LT

## 2025-01-24 PROCEDURE — 70450 CT HEAD/BRAIN W/O DYE: CPT | Mod: MC

## 2025-01-24 PROCEDURE — 73610 X-RAY EXAM OF ANKLE: CPT

## 2025-01-24 PROCEDURE — 71045 X-RAY EXAM CHEST 1 VIEW: CPT

## 2025-01-24 PROCEDURE — 99285 EMERGENCY DEPT VISIT HI MDM: CPT

## 2025-01-24 PROCEDURE — 85610 PROTHROMBIN TIME: CPT

## 2025-01-24 PROCEDURE — 84484 ASSAY OF TROPONIN QUANT: CPT

## 2025-01-24 PROCEDURE — 85025 COMPLETE CBC W/AUTO DIFF WBC: CPT

## 2025-01-24 PROCEDURE — 99285 EMERGENCY DEPT VISIT HI MDM: CPT | Mod: 25

## 2025-01-24 PROCEDURE — 93005 ELECTROCARDIOGRAM TRACING: CPT

## 2025-01-24 PROCEDURE — 70496 CT ANGIOGRAPHY HEAD: CPT | Mod: MC

## 2025-01-24 PROCEDURE — 85730 THROMBOPLASTIN TIME PARTIAL: CPT

## 2025-01-24 PROCEDURE — 87637 SARSCOV2&INF A&B&RSV AMP PRB: CPT

## 2025-01-24 PROCEDURE — 71045 X-RAY EXAM CHEST 1 VIEW: CPT | Mod: 26

## 2025-01-24 PROCEDURE — 70498 CT ANGIOGRAPHY NECK: CPT | Mod: MC

## 2025-01-24 PROCEDURE — 70496 CT ANGIOGRAPHY HEAD: CPT | Mod: 26

## 2025-01-24 PROCEDURE — 81001 URINALYSIS AUTO W/SCOPE: CPT

## 2025-01-24 PROCEDURE — 70450 CT HEAD/BRAIN W/O DYE: CPT | Mod: 26,59

## 2025-01-24 PROCEDURE — 36000 PLACE NEEDLE IN VEIN: CPT | Mod: XU

## 2025-01-24 NOTE — ED ADULT NURSE NOTE - NS ED PATIENT SAFETY CONCERN
Patient: Tara Juárez Date: 10/6/2019   : 1954 Attending: Kristofer Drew MD   64 year old female      Chief Complaint:   Chief Complaint   Patient presents with   • Altered Mental Status       Subjective:  Much more awake and alert and oriented today.  Currently has no focal deficits.  Patient conversing normally today.    Problem List:   Patient Active Problem List   Diagnosis   • Other secondary hypertension, benign   • Other complications due to heart valve prosthesis   • Warfarin anticoagulation   • Contusion of knee   • Pain in joint, lower leg   • Localization-related (focal) (partial) epilepsy and epileptic syndromes with complex partial seizures, without mention of intractable epilepsy   • Osteoporosis   • Vitamin D deficiency   • Aortic valve replacement   • Bipolar 1 disorder (CMS/HCC)   • S/P ankle joint replacement   • Hx of total ankle replacement   • Tibia fracture   • Anemia, unspecified   • Chronic pain   • Acquired spondylolisthesis   • Lumbosacral spondylosis without myelopathy   • Other chronic pain   • Acute paraplegia (CMS/HCC)   • Bipolar disorder (CMS/HCC)   • S/P ankle fusion   • Precordial pain   • Pacemaker, [employer]onic, dual chamber    • Bradycardia   • Sinoatrial node dysfunction (CMS/HCC)   • Subarachnoid hemorrhage (CMS/HCC)   • Open displaced fracture of right clavicle   • Multiple falls   • Migraines   • Incontinent of feces   • Bladder incontinence   • Gait abnormality   • Numbness and tingling in both hands   • Bilateral carpal tunnel syndrome   • Paresthesia and pain of both upper extremities   • Impaired mobility   • Generalized weakness       Allergies:   ALLERGIES:   Allergen Reactions   • Morphine ANAPHYLAXIS   • Percocet [Oxycodone-Acetaminophen] Other (See Comments)     Very bad nightmares, can take during the day with no problem  Patient tolerates endocet brand name, but not percocet brand name per family.    • Phytonadione Other (See Comments)     syncope   •  Vicodin [Hydrocodone-Acetaminophen] Other (See Comments)     Very bad nightmares, agitation per family   • Methadone NAUSEA         Physical Exam    General - Patient is in no acute distress. Alert and oriented to time, place and person.   MUSCULOSKELETAL :No cyanosis or edema.  Strength 5/5 B/L Upper extremities, 5/5  B/L Lower extremities,  Derm: No skin lesions, warm, and dry.   CV - Regular rate and rhythm without additional heart sounds. No carotid bruits. Pedal pulses 2+, No edema  Pulm - Lungs are clear to auscultation bilaterally. No wheezes, rales or rhonchi. Good aeration throughout with normal I:E ratio.   Abd - Soft, non-tender and non-distended. Bowel sounds are normoactive. No guarding or rebound tenderness. No hepatosplenomegaly  Neuro - Alert and orient to person, place and time. CNs II-XII are intact. Strength 5/5 B/L Upper extremities, 5/5  B/L Lower extremities, sensation in tact, and coordination are grossly intact.  Psych: No anxiety or depression, normal affect.    Medications/Infusions: Reviewed    Review of Systems: All systems reviewed and negative except for those mentioned in the history of present illness                Vital Last Value 24 Hour Range   Temperature 97.9 °F (36.6 °C) (10/06/19 0731) Temp  Min: 97.6 °F (36.4 °C)  Max: 98.7 °F (37.1 °C)   Pulse 60 (10/06/19 0731) Pulse  Min: 60  Max: 65   Respiratory 18 (10/06/19 0731) Resp  Min: 12  Max: 20   Non-Invasive  Blood Pressure 122/82 (10/06/19 0731) BP  Min: 120/70  Max: 147/80   Pulse Oximetry 97 % (10/06/19 0731) SpO2  Min: 96 %  Max: 98 %     Vital Today Admitted   Weight 58.1 kg (10/04/19 1729) Weight: 64.6 kg (10/04/19 1030)   Height N/A Height: 5' 2\" (157.5 cm) (10/04/19 1030)   BMI N/A BMI (Calculated): 26.05 (10/04/19 1030)       Intake/Output:      Intake/Output Summary (Last 24 hours) at 10/6/2019 0951  Last data filed at 10/6/2019 0944  Gross per 24 hour   Intake 2115.58 ml   Output --   Net 2115.58 ml                Laboratory Results:   Recent Labs   Lab 10/06/19  0630 10/05/19  2351 10/05/19  1736  10/05/19  0938 10/05/19  0444  10/04/19  1105   WBC 7.4  --   --   --   --  4.8  --  6.9   HCT 34.5*  --   --   --   --  38.3  --  40.9   HGB 10.5*  --   --   --   --  11.6*  --  12.6     --   --   --   --  229  --  236   INR 1.1  --   --   --   --  1.1  --  1.1   PTT 36* 33* 31   < >  --  48*  --   --    SODIUM 148*  --   --   --  149* 152*   < > 151*   POTASSIUM 3.6  --   --   --  3.8 3.8   < > 3.7   CHLORIDE 115*  --   --   --  116* 118*   < > 116*   CO2 27  --   --   --  28 29   < > 30   CALCIUM 8.8  --   --   --  9.6 9.6   < > 10.2   GLUCOSE 86  --   --   --  133* 107*   < > 95   BUN 4*  --   --   --  6 5*   < > 5*   CREATININE 0.83  --   --   --  0.78 0.74   < > 0.80   AST 13  --   --   --   --  9  --  22   GPT 13  --   --   --   --  18  --  20   ALKPT 83  --   --   --   --  96  --  103   BILIRUBIN 0.5  --   --   --   --  0.6  --  0.4   ALBUMIN 2.8*  --   --   --   --  3.2*  --  3.5*   GFRNA 75  --   --   --  80 86   < > 78   PHOS  --   --   --   --   --  2.9  --   --     < > = values in this interval not displayed.       Imaging: Xr Chest Ap Or Pa    Result Date: 10/4/2019  EXAM: XR CHEST AP OR PA HISTORY: Infection. COMPARISON: September 18, 2019 TECHNIQUE: AP, portable view of the chest. FINDINGS: Status post sternotomy. Prosthetic cardiac valve is again noted. Postsurgical changes involving the mediastinum. Stable cardiac silhouette and mediastinum. Stable mild tortuosity of the thoracic aorta. The patient is rotated to the left. Overlying monitor leads are noted. A left-sided dual-lead pacemaker device is again visualized. No pneumothorax or evidence of pulmonary vascular congestion. Mild bibasilar atelectasis, increased from prior. No significant pleural effusion. Osseous hypertrophic and degenerative changes. Scoliosis of the spine. Postsurgical changes involving the lumbar spine. A spinal stimulator device  projects over the upper abdomen. Remote fracture of the right clavicle. Postsurgical changes involving the right shoulder with right humeral prosthetic. Severe degenerative changes involving the left shoulder.     IMPRESSION:  Mild bibasilar atelectasis, increased from prior.     Ct Head Wo Contrast    Result Date: 10/4/2019  EXAM:  CT HEAD WO CONTRAST CLINICAL INDICATION: 64 years-old Female, presenting history of altered mental state. COMPARISON:  CT head 9/18/2019. TECHNIQUE:  Routine noncontrast head CT spanning cranial vertex through foramen magnum. Coronal and sagittal reformats. FINDINGS:     There is mild, global volume loss with associated prominence of the ventricles and sulci. There is no midline shift or mass effect. The basal cisterns are patent. No pathologic extra-axial collection. No acute intracranial hemorrhage. The gray-white differentiation is maintained. No CT evidence for acute or evolving territorial infarct. Scattered hypodensities in the supratentorial white matter are nonspecific, likely representing mild small vessel ischemic disease. Chronic lacunar infarct or prominent perivascular space in the right basal ganglia, unchanged.    The calvarium is intact. Mild to moderate mucosal thickening of the visualized paranasal sinuses. The mastoid air cells are clear.         IMPRESSION:  1.  No acute intracranial process. 2.  Mild probable chronic small vessel ischemic disease. MRI can better assess for acute infarct.  //Location Code: [facility]                  Assessment and Plan:     1. Altered mental status:  Likely postictal encephalopathy.  CT scan was done today to rule out possible stroke as she is subtherapeutic on her Coumadin.  Given her low on known last well known time patient is not a candidate for tPA.  The patient will be placed on continuous video EEG monitoring and noted to have temporal sharp waves.  Keppra has been increased to 750 mg b.i.d..  Continue monitoring for now, neurology  following.  2. Seizure disorder:   continue Keppra  3. Bipolar disorder:  Continue lithium, will check lithium level.  4. 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   I consulted Nephrology.  Will continue D5/0.45, at 100 ml/hour.  Will check a BMP q.8 hours, results to be related to Nephrology.  5. Mechanical aortic valve replacement in 2017:  Patient's INR is subtherapeutic, now on heparin drip.  Will restart Coumadin .  6. Protein calorie malnutrition:  Moderate to severe.  7. Chronic pain syndrome:  Patient has a pain pump in place.  8. History of recurrent falls:  Has history of multiple fractures:  We will continue PTOT.  9. Chronic opioid induced constipation:  Continue the regimen.  10. DVT prphlxs: Heparin gtt.      Discharge   alex Drew MD  Pager: [phone]  From 7 pm to 7 am please call on call pager: [phone].   No

## 2025-01-24 NOTE — ED PROVIDER NOTE - PATIENT PORTAL LINK FT
You can access the FollowMyHealth Patient Portal offered by Health system by registering at the following website: [email or web address]. By joining "[employer]’s FollowMyHealth portal, you will also be able to view your health information using other applications (apps) compatible with our system. You can access the FollowMyHealth Patient Portal offered by [facility] by registering at the following website: [email or web address]. By joining [employer]’s FollowMyHealth portal, you will also be able to view your health information using other applications (apps) compatible with our system.

## 2025-01-24 NOTE — ED ADULT TRIAGE NOTE - TEMP AT ED ARRIVAL (C)
36.8 Patient is awake, A&O x 4, NAD, presents to ED for epigastric pain.  She is experiencing nausea, vomiting, diarrhea and abdominal discomfort without relief.  Patient was recently diagnosed with diverticulitis during Thanksgiving week.  Denies SOB, dizziness, weakness or fever.  20g IV left AC, labs drawn and sent, meds given and will continue to monitor patient.

## 2025-01-24 NOTE — ED PROVIDER NOTE - ATTENDING CONTRIBUTION TO CARE
Attending MD Winifred Harmon:  I personally have seen and examined this patient.  Resident note reviewed and agree on plan of care and except where noted.  See HPI, PE, and MDM for details.

## 2025-01-24 NOTE — ED PROVIDER NOTE - OBJECTIVE STATEMENT
53 y/o male hx CVA w/ residual expressive aphasia, h/o b/l DVTs, seizure disorder, and vertigo presents with one week of dizziness, lethargy that feels similar to stroke two years ago. 53 y/o male hx CVA w/ residual expressive aphasia, h/o b/l DVTs, seizure disorder, and vertigo presents with one week of dizziness, lethargy that feels similar to stroke two years ago. He has difficulty describing symptoms, but it feels as if he has brain fog and thoughts are overwhelming and if he moves forward he loses sense of his body position. No fever/chills, chest pain, abdominal pain, n/v/d, cough, urinary complaints, leg swelling.

## 2025-01-24 NOTE — ED ADULT NURSE NOTE - OBJECTIVE STATEMENT
Male 54 years old with past medical history of TIA, alert and orientedx4, brought in by EMS for dizziness. Pt reports intermittent lightheadedness and feeling weak for a week.  Denies chest pain, sob, N/V, fever, cough, chills, abdominal pain or diarrhea. States he had history of stroke 2 years ago and feels that his symptoms now is related to his stroke. Alert and orientedx4. Safety and comfort maintained. Call bell within easy reach. Will continue to monitor.

## 2025-01-24 NOTE — ED PROVIDER NOTE - CARE PROVIDER_API CALL
Braxton Forde  Neurology  [address], [address]  [place] [address]  Phone: [phone]  Fax: [phone]  Follow Up Time:

## 2025-01-24 NOTE — ED PROVIDER NOTE - NSFOLLOWUPINSTRUCTIONS_ED_ALL_ED_FT
You have been evaluated for neurologic symptoms. Imaging/labs did not reveal any cause for immediate concern. Please continue to take your anti-seizure medications and follow up with Dr. Forde.     Please return to Emergency Department immediately for any new, concerning, or worsening symptoms.     Any results obtained today during your evaluation is attached and available in your portal. Please take all your results to follow up with your primary care doctor so that they can determine if you need any additional testing or treatment as an outpatient.     Please take prescriptions as discussed.

## 2025-01-24 NOTE — ED PROVIDER NOTE - CLINICAL SUMMARY MEDICAL DECISION MAKING FREE TEXT BOX
53 y/o male hx CVA w/ residual expressive aphasia, h/o b/l DVTs, seizure disorder, and vertigo presents with one week of dizziness, lethargy that feels similar to stroke two years ago. 55 y/o male hx CVA w/ residual expressive aphasia, h/o b/l DVTs, seizure disorder, and vertigo presents with one week of dizziness, lethargy that feels similar to stroke two years ago. He is hemodynamically stable, afebrile, on exam he is generally well appearing with no focal neurologic deficits on exam, no pronator drift, intact finger to nose, clear lungs, soft non-tender abdomen, no leg swelling. Given history concern for primary neurologic process, will get CT imaging, labs, consult neurology. 55 y/o male hx CVA w/ residual expressive aphasia, h/o b/l DVTs, seizure disorder, and vertigo presents with one week of dizziness, lethargy that feels similar to stroke two years ago. He is hemodynamically stable, afebrile, on exam he is generally well appearing with no focal neurologic deficits on exam, no pronator drift, intact finger to nose, clear lungs, soft non-tender abdomen, no leg swelling. Given history concern for primary neurologic process, will get CT imaging, labs, consult neurology.  Attending Winifred Harmon: 53 yo male with multiple medica issues including h/o prior dvt, seizure disorder, cva with some expressive aphasia presenting with concern for not feeling well with intermittent epidosdes of lethargy and dizziness. upon arrival pt awake and alert following commands. pt states sypmtoms for last week. out of window for code stroke.on exam pt awake and alert. per friend at bedside pt at his baseline. no recent falls or trauma. no fevers or infectous symptoms.  no new medications o r exposures. pt with multiple medical issues. ct scans perfomed to evaluate for intracranial hemorrhage or evidence of acute changes. d/w neurology. who recommended o/p follow up with pt's neurologist. do not feel symptoms likely releated to seizure. recommend conrtinuing aspirin. will give close return precautions

## 2025-01-24 NOTE — ED ADULT NURSE NOTE - NSFALLRISKASMT_ED_ALL_ED_DT
Patient called stating that she thinks she may still have a UTI.  Patient is having burning and itching.    Please call to discuss.   24-Jan-2025 17:21

## 2025-01-24 NOTE — ED PROVIDER NOTE - PHYSICAL EXAMINATION
see mdm see mdm  Attending Winifred Harmon: Gen: NAD, heent: atrauamtic, eomi, perrla, mmm, op pink,, neck; nttp, no nuchal rigidity,, cv: rrr, no murmurs, lungs: ctab, abd: soft, nontender, nondistended, no peritoneal signs no guarding, ext: wwp, neg homans, skin: no rash, neuro: awake and alert, following commands, speech clear, sensation and strength intact, no focal deficits

## 2025-01-24 NOTE — ED PROVIDER NOTE - PROGRESS NOTE DETAILS
patient seen by neurology no acute concerns will f/u outpatient with Dr. Rivas patient seen by neurology no acute concerns will f/u outpatient with Dr. Rivas. repeat trop flat

## 2025-01-25 LAB
CULTURE RESULTS: SIGNIFICANT CHANGE UP
SPECIMEN SOURCE: SIGNIFICANT CHANGE UP

## 2025-01-25 NOTE — CONSULT NOTE ADULT - ASSESSMENT
TAMI DUNHAM is a 54y (1970) man with a PMHx significant for multiple CVAs with residual expressive aphasia, history of bilateral DVTs, seizure disorder, vertigo, left lower extremity compartment syndrome status post fasciotomy who presents to the ER today on 1/24 for complaints of lethargy that feels similar to his stroke episodes 2 years ago.  Patient follows up with Dr. Forde outpatient.  Neurology consulted and promptly at bedside.    NIHSS 1 (LLE numbness baseline)  MRS 1  LKW unclear     Impression  Lightheadedness and brain fog with unknown etiology. Given hx of CVA and seizure, brain imaging was completed and without acute intracranial pathology. Recommend outpatient followup.     Recommendations  [] Upon discharge, patient should follow up with Dr. Forde:  [phone] [address] [place] [address]   [] Continue home ASA 81mg daily  [] Continue home Vimpat 150mg BID   [] Counseled on importance of hydration and pt to reduce sugar intake 2/2 apple juice consumption    Case discussed with neuro stroke and epilepsy fellow. Pending chart attestation.

## 2025-01-25 NOTE — CONSULT NOTE ADULT - SUBJECTIVE AND OBJECTIVE BOX
Neurology - Consult Note    -  Spectra: [id number] ([facility]), [phone] ([facility])  -    HPI:   TAMI DUNHAM is a 54y (1970) man with a PMHx significant for multiple CVAs with residual expressive aphasia, history of bilateral DVTs, seizure disorder, vertigo, left lower extremity compartment syndrome status post fasciotomy who presents to the ER today on 1/24 for complaints of lethargy that feels similar to his stroke episodes 2 years ago.  Patient follows up with Dr. Fodre outpatient.  Neurology consulted and promptly at bedside.    Upon further interview, patient express that he has been feeling brain fog and his thoughts are overwhelming and occasionally has lightheadedness.  He denies any room spinning dizziness, difficulty ambulating or weakness.    NIHSS 1 (LLE numbness baseline)  MRS 1  LKW unclear     Review of Systems:    CONSTITUTIONAL: No fevers or chills  EYES AND ENT: No visual changes or no throat pain   NECK: No pain or stiffness  RESPIRATORY: No hemoptysis or shortness of breath  CARDIOVASCULAR: No chest pain or palpitations  GASTROINTESTINAL: No melena or hematochezia  GENITOURINARY: No dysuria or hematuria  NEUROLOGICAL: +As stated in HPI above  SKIN: No itching, burning, rashes, or lesions   All other review of systems is negative unless indicated above.    Allergies:  No Known Allergies      PMHx/PSHx/Family Hx: As above, otherwise see below   History of TIAs    CVA (cerebrovascular accident)    HLD (hyperlipidemia)    Vertigo    Unresponsiveness    Colon polyp    Expressive aphasia    Spinal stenosis    Seizure disorder    CVA (cerebrovascular accident)        Social Hx:  No current use of tobacco, alcohol, or illicit drugs  Lives with family    Medications:  MEDICATIONS  (STANDING):    MEDICATIONS  (PRN):      Vitals:  T(C): 36.8 (01-24-25 @ 19:56), Max: 37.1 (01-24-25 @ 17:15)  HR: 76 (01-24-25 @ 19:56) (76 - 83)  BP: 121/81 (01-24-25 @ 19:56) (110/79 - 121/81)  RR: 18 (01-24-25 @ 19:56) (18 - 20)  SpO2: 96% (01-24-25 @ 19:56) (95% - 97%)    Physical Examination:     Constitutional: well-developed, well-nourished, well-groomed  Cardiovascular: no swelling, warm-to-touch    Neurological Examination:    - Mental Status: Eyes open, attends to examiner; oriented to person, age, month, year, location, and situation; speech is fluent with intact naming, intact repetition, and follows 1-step commands     - Cranial Nerves:  II: Visual fields are full to confrontation; pupils are equal, round, and reactive to light   III, IV, VI: Extraocular movements are intact without nystagmus  V: Facial sensation is intact in the V1-V3 distribution bilaterally  VII: Face is symmetric with normal eye closure and smile  VIII: Hearing is intact to conversation  IX, X: Uvula is midline and soft palate rises symmetrically  XI: Shoulder shrug intact  XII: Tongue protrudes in the midline    - Motor/Strength Testing:  - No drifts x 4 extremities.                                   Right           Left  Deltoid                     5                 5  Biceps                      5                 5  Triceps                     5                 5  Wrist Ext (radial)       5                 5  Hand                  5                 5    Hip Flex                   5                  5  Knee Ext	      5                  5  Dorsiflex                  5                 4+  Plantarflex               5                 4+    - There is no pronator drift.   - Normal muscle bulk and tone throughout.    - Reflexes:   Bicep (C5/C6):                  R 2+ --- L 2+   Brachioradialis (C5/C6) :   R 2+ --- L 2+   Patella (L3/L4) :                 R 2+ --- L 2+   Ankle (S1) :                       R 2+ --- L 2+     - Sensory: Decreased on LLE (Baseline due to fasciotomy)   - Coordination: Finger-nose-finger intact without ataxia or dysmetria.    - Gait: Slightly wide based gait,  (Baseline)     Labs:                        13.5   6.43  )-----------( 185      ( 24 Jan 2025 17:21 )             40.6     01-24    140  |  104  |  17  ----------------------------<  110[H]  4.1   |  25  |  0.97    Ca    9.2      24 Jan 2025 17:21    TPro  7.2  /  Alb  4.2  /  TBili  0.3  /  DBili  x   /  AST  17  /  ALT  23  /  AlkPhos  77  01-24    CAPILLARY BLOOD GLUCOSE        LIVER FUNCTIONS - ( 24 Jan 2025 17:21 )  Alb: 4.2 g/dL / Pro: 7.2 g/dL / ALK PHOS: 77 U/L / ALT: 23 U/L / AST: 17 U/L / GGT: x             PT/INR - ( 24 Jan 2025 17:21 )   PT: 11.2 sec;   INR: 0.98 ratio         PTT - ( 24 Jan 2025 17:21 )  PTT:33.0 sec  CSF:                  Radiology:  CT Head No Cont:  (24 Jan 2025 18:51)  CT Head:  There is moderate diffuse parenchymal volume loss.    There are areas of low attenuation in the periventricular white matter   likely related to mild chronic microvascular ischemic changes.    Small nonspecific calcification again noted in the alexis.    There is no acute intracranial hemorrhage, mass effect or midline shift.   There is no acute territorial infarct. There is no hydrocephalus.    The cranium is intact.    Mucosal thickening paranasal sinuses      CTA of the neck:      The common carotid and internal carotid arteries are patent.    The extracranial vertebral arteries are patent. Dominant right vertebral   artery noted    Multilevel degenerative changes noted.    CTA Head:      The proximal anterior, middle and posterior cerebral arteries are patent.    The intracranial vertebral and basilar arteries are patent.    There is no intracranial aneurysm.          IMPRESSION:  No acute intracranial hemorrhage or acute territorial infarct. If   symptoms persist, follow-up MRI exam recommended.    No hemodynamically significant stenosis

## 2025-01-27 ENCOUNTER — APPOINTMENT (OUTPATIENT)
Dept: UROLOGY | Facility: CLINIC | Age: 55
End: 2025-01-27
Payer: MEDICAID

## 2025-01-27 VITALS — DIASTOLIC BLOOD PRESSURE: 72 MMHG | SYSTOLIC BLOOD PRESSURE: 115 MMHG | HEART RATE: 80 BPM

## 2025-01-27 DIAGNOSIS — R31.0 GROSS HEMATURIA: ICD-10-CM

## 2025-01-27 PROCEDURE — 99214 OFFICE O/P EST MOD 30 MIN: CPT

## 2025-01-27 PROCEDURE — G2211 COMPLEX E/M VISIT ADD ON: CPT | Mod: NC

## 2025-02-03 NOTE — ED ADULT NURSE NOTE - NSFALLRSKINDICATORS_ED_ALL_ED
Price (Do Not Change): 0.00 Detail Level: Simple Instructions: This plan will send the code FBSE to the PM system.  DO NOT or CHANGE the price. yes

## 2025-02-05 ENCOUNTER — APPOINTMENT (OUTPATIENT)
Dept: OPHTHALMOLOGY | Facility: CLINIC | Age: 55
End: 2025-02-05

## 2025-02-18 ENCOUNTER — TRANSCRIPTION ENCOUNTER (OUTPATIENT)
Age: 55
End: 2025-02-18

## 2025-02-21 ENCOUNTER — APPOINTMENT (OUTPATIENT)
Dept: GASTROENTEROLOGY | Facility: CLINIC | Age: 55
End: 2025-02-21
Payer: MEDICAID

## 2025-02-21 PROCEDURE — 99202 OFFICE O/P NEW SF 15 MIN: CPT | Mod: 93

## 2025-02-21 RX ORDER — POLYETHYLENE GLYCOL 3350 AND ELECTROLYTES WITH LEMON FLAVOR 236; 22.74; 6.74; 5.86; 2.97 G/4L; G/4L; G/4L; G/4L; G/4L
236 POWDER, FOR SOLUTION ORAL
Qty: 1 | Refills: 0 | Status: ACTIVE | COMMUNITY
Start: 2025-02-21 | End: 1900-01-01

## 2025-03-10 NOTE — DISCHARGE NOTE NURSING/CASE MANAGEMENT/SOCIAL WORK - NSDCCRNUMBER_GEN_ALL_CORE_FT
Medical Week 2 Survey      Flowsheet Row Responses   [facility] patient discharged from? [place]   Does the patient have one of the following disease processes/diagnoses(primary or secondary)? Other   Week 2 attempt successful? No   Unsuccessful attempts Attempt 1   Revoke Other transitional program            Kaelyn Monique Registered Nurse   [phone]

## 2025-03-13 ENCOUNTER — OUTPATIENT (OUTPATIENT)
Dept: OUTPATIENT SERVICES | Facility: HOSPITAL | Age: 55
LOS: 1 days | End: 2025-03-13
Payer: MEDICAID

## 2025-03-13 VITALS
WEIGHT: 195.99 LBS | RESPIRATION RATE: 20 BRPM | HEIGHT: 75 IN | SYSTOLIC BLOOD PRESSURE: 114 MMHG | OXYGEN SATURATION: 98 % | HEART RATE: 74 BPM | DIASTOLIC BLOOD PRESSURE: 79 MMHG | TEMPERATURE: 98 F

## 2025-03-13 DIAGNOSIS — Z01.818 ENCOUNTER FOR OTHER PREPROCEDURAL EXAMINATION: ICD-10-CM

## 2025-03-13 DIAGNOSIS — Z98.890 OTHER SPECIFIED POSTPROCEDURAL STATES: Chronic | ICD-10-CM

## 2025-03-13 DIAGNOSIS — K63.5 POLYP OF COLON: ICD-10-CM

## 2025-03-13 LAB
ANION GAP SERPL CALC-SCNC: 12 MMOL/L — SIGNIFICANT CHANGE UP (ref 5–17)
BUN SERPL-MCNC: 19 MG/DL — SIGNIFICANT CHANGE UP (ref 7–23)
CALCIUM SERPL-MCNC: 9 MG/DL — SIGNIFICANT CHANGE UP (ref 8.4–10.5)
CHLORIDE SERPL-SCNC: 104 MMOL/L — SIGNIFICANT CHANGE UP (ref 96–108)
CO2 SERPL-SCNC: 25 MMOL/L — SIGNIFICANT CHANGE UP (ref 22–31)
CREAT SERPL-MCNC: 0.9 MG/DL — SIGNIFICANT CHANGE UP (ref 0.5–1.3)
EGFR: 101 ML/MIN/1.73M2 — SIGNIFICANT CHANGE UP
EGFR: 101 ML/MIN/1.73M2 — SIGNIFICANT CHANGE UP
GLUCOSE SERPL-MCNC: 92 MG/DL — SIGNIFICANT CHANGE UP (ref 70–99)
HCT VFR BLD CALC: 41.2 % — SIGNIFICANT CHANGE UP (ref 39–50)
HGB BLD-MCNC: 13.7 G/DL — SIGNIFICANT CHANGE UP (ref 13–17)
MCHC RBC-ENTMCNC: 29.5 PG — SIGNIFICANT CHANGE UP (ref 27–34)
MCHC RBC-ENTMCNC: 33.3 G/DL — SIGNIFICANT CHANGE UP (ref 32–36)
MCV RBC AUTO: 88.8 FL — SIGNIFICANT CHANGE UP (ref 80–100)
NRBC BLD AUTO-RTO: 0 /100 WBCS — SIGNIFICANT CHANGE UP (ref 0–0)
PLATELET # BLD AUTO: 207 K/UL — SIGNIFICANT CHANGE UP (ref 150–400)
POTASSIUM SERPL-MCNC: 4 MMOL/L — SIGNIFICANT CHANGE UP (ref 3.5–5.3)
POTASSIUM SERPL-SCNC: 4 MMOL/L — SIGNIFICANT CHANGE UP (ref 3.5–5.3)
RBC # BLD: 4.64 M/UL — SIGNIFICANT CHANGE UP (ref 4.2–5.8)
RBC # FLD: 12.5 % — SIGNIFICANT CHANGE UP (ref 10.3–14.5)
SODIUM SERPL-SCNC: 141 MMOL/L — SIGNIFICANT CHANGE UP (ref 135–145)
WBC # BLD: 6.39 K/UL — SIGNIFICANT CHANGE UP (ref 3.8–10.5)
WBC # FLD AUTO: 6.39 K/UL — SIGNIFICANT CHANGE UP (ref 3.8–10.5)

## 2025-03-13 PROCEDURE — 80048 BASIC METABOLIC PNL TOTAL CA: CPT

## 2025-03-13 PROCEDURE — G0463: CPT

## 2025-03-13 PROCEDURE — 85027 COMPLETE CBC AUTOMATED: CPT

## 2025-03-13 RX ORDER — LISDEXAMFETAMINE DIMESYLATE 20 MG/1
1 TABLET, CHEWABLE ORAL
Refills: 0 | DISCHARGE

## 2025-03-13 NOTE — H&P PST ADULT - ASSESSMENT
DASI score: 7.25   DASI activity: Walks long distances, lifts weights, ADLs  Loose teeth or dentures:  Denies   Airway:  MP 1

## 2025-03-13 NOTE — H&P PST ADULT - LAST STRESS TEST
Denies  - CT coronaries 5/2023 Nonobstructive CAD. No evidence of atrial septal defects or anatomic cardiac congenital abnormalities.

## 2025-03-13 NOTE — H&P PST ADULT - MUSCULOSKELETAL
using rolling walker/ROM intact/no joint erythema/no joint warmth/no calf tenderness/abnormal gait details…

## 2025-03-13 NOTE — H&P PST ADULT - NSICDXPASTSURGICALHX_GEN_ALL_CORE_FT
PAST SURGICAL HISTORY:  H/O arthroscopy of knee     H/O fasciotomy     History of arthroscopy of both knees     History of fasciotomy     History of loop recorder     S/P excision of neuroma

## 2025-03-13 NOTE — H&P PST ADULT - HISTORY OF PRESENT ILLNESS
54y (1970) male  with a PMHx significant for multiple CVAs with residual expressive aphasia, s/p loop recorder  placed Feb 2022 after 1st CVA, history of bilateral DVTs ( chronic  Lovenox since then -last duplex scan reveled no DVT results in sunrise )) , seizure disorder, (last seizure Dec. 2022),  (+) loop recorder placed Feb 2022 after 1st CVA. vertigo, left lower extremity compartment syndrome status post fasciotomy who presents to the ER t on 1/24/25  for complaints of lethargy that feels similar to his stroke episodes 2 years ago.  Patient    Neurology consulted and promptly at bedside. Lightheadedness and brain fog with unknown etiology. Given hx of CVA and seizure, brain imaging was completed and without acute intracranial pathology. Present to PST pre-procedure for colonoscopy/polypectomy under anesthesia on 3/21/25.     Previous colonoscopy was cancelled  on 1/31/24 with Dr. Maurer. due to recent hospital admission with dizziness .

## 2025-03-13 NOTE — H&P PST ADULT - PROBLEM SELECTOR PLAN 1
Plan for colonoscopy/polypectomy under anesthesia on 3/21/25  PST labs sent  Pre procedure instructions discussed  Pre-op education provided - all questions answered

## 2025-03-14 PROBLEM — I63.9 CEREBRAL INFARCTION, UNSPECIFIED: Chronic | Status: INACTIVE | Noted: 2024-04-27 | Resolved: 2025-03-13

## 2025-03-21 ENCOUNTER — APPOINTMENT (OUTPATIENT)
Dept: GASTROENTEROLOGY | Facility: HOSPITAL | Age: 55
End: 2025-03-21

## 2025-03-21 ENCOUNTER — OUTPATIENT (OUTPATIENT)
Dept: OUTPATIENT SERVICES | Facility: HOSPITAL | Age: 55
LOS: 1 days | End: 2025-03-21
Payer: MEDICAID

## 2025-03-21 ENCOUNTER — TRANSCRIPTION ENCOUNTER (OUTPATIENT)
Age: 55
End: 2025-03-21

## 2025-03-21 VITALS
HEIGHT: 75 IN | OXYGEN SATURATION: 100 % | TEMPERATURE: 97 F | HEART RATE: 68 BPM | RESPIRATION RATE: 15 BRPM | DIASTOLIC BLOOD PRESSURE: 74 MMHG | WEIGHT: 199.96 LBS | SYSTOLIC BLOOD PRESSURE: 112 MMHG

## 2025-03-21 VITALS
HEART RATE: 57 BPM | DIASTOLIC BLOOD PRESSURE: 67 MMHG | OXYGEN SATURATION: 100 % | RESPIRATION RATE: 17 BRPM | SYSTOLIC BLOOD PRESSURE: 108 MMHG

## 2025-03-21 DIAGNOSIS — Z98.890 OTHER SPECIFIED POSTPROCEDURAL STATES: Chronic | ICD-10-CM

## 2025-03-21 DIAGNOSIS — K63.5 POLYP OF COLON: ICD-10-CM

## 2025-03-21 PROCEDURE — 45338 SIGMOIDOSCOPY W/TUMR REMOVE: CPT

## 2025-03-21 PROCEDURE — C1889: CPT

## 2025-03-21 PROCEDURE — 45338 SIGMOIDOSCOPY W/TUMR REMOVE: CPT | Mod: GC

## 2025-03-21 DEVICE — CLIP HEMO INSTINCT PLUS ENDOSCOPIC: Type: IMPLANTABLE DEVICE | Status: FUNCTIONAL

## 2025-03-21 DEVICE — NET RETRV ROT ROTH 2.5MMX230CM: Type: IMPLANTABLE DEVICE | Status: FUNCTIONAL

## 2025-03-21 NOTE — ASU DISCHARGE PLAN (ADULT/PEDIATRIC) - ASU DC SPECIAL INSTRUCTIONSFT
Take Lovenox today when you get home.  Let radiologist/radiology technician know that you have a metal clip ([employer] Instinct Plus - MR conditional) in the colon if you have an MRI in the next month.  Repeat colonoscopy in 1.5 years for surveillance and removal of known small polyp in the right colon.

## 2025-03-21 NOTE — ASU DISCHARGE PLAN (ADULT/PEDIATRIC) - PROCEDURE
Colonoscopy Flexible sigmoidoscopy / removal of large sigmoid polyp / hemoclip placement (MR conditional - see below)

## 2025-03-21 NOTE — PRE PROCEDURE NOTE - PRE PROCEDURE EVALUATION
Attending Physician:   Erasto                         Procedure:  Colonoscopy / removal of polyps    Indication for Procedure:  Known large sigmoid polyp  ________________________________________________________  PAST MEDICAL & SURGICAL HISTORY:  History of TIAs    Dizziness x 1-2 months    CVA (cerebrovascular accident)      HLD (hyperlipidemia)      Vertigo      Colon polyp      Seizure disorder      History of fasciotomy      H/O arthroscopy of knee      S/P excision of neuroma      History of loop recorder      H/O fasciotomy      History of arthroscopy of both knees        ALLERGIES:  No Known Allergies    HOME MEDICATIONS:  aspirin 81 mg oral tablet, chewable: 1 tab(s) orally once a day  atorvastatin 20 mg oral tablet: 1 tab(s) orally once a day (at bedtime)  enoxaparin: 40 milligram(s) subcutaneous every 24 hours  gabapentin 300 mg oral capsule: 1 cap(s) orally 3 times a day  lacosamide 150 mg oral tablet: 1 tab(s) orally 2 times a day  Multiple Vitamins oral tablet: 1 tab(s) orally once a day  tamsulosin 0.4 mg oral capsule: 1 cap(s) orally once a day (at bedtime)  Vyvanse 50 mg oral capsule: 1 cap(s) orally once a day    AICD/PPM: [ ] yes   [ x] no    PERTINENT LAB DATA:                      PHYSICAL EXAMINATION:    Height (cm): 190.5  Weight (kg): 90.7  BMI (kg/m2): 25  BSA (m2): 2.19T(C): 36.1  HR: 68  BP: 112/74  RR: 15  SpO2: 100%    Constitutional: NAD  HEENT: anicteric  Neck:  No JVD  Respiratory: CTAB/L  Cardiovascular: S1 and S2  Gastrointestinal: BS+, soft, NT/ND  Extremities: No peripheral edema  Neurological: Mild word finding difficulties  Psychiatric: Normal mood, normal affect  Skin: No jaundice    ASA Class: I [ ]  II [ ]  III [x ]  IV [ ]    COMMENTS:    The patient is a suitable candidate for the planned procedure unless box checked [ ]  No, explain:

## 2025-03-21 NOTE — ASU DISCHARGE PLAN (ADULT/PEDIATRIC) - FINANCIAL ASSISTANCE
[facility] provides services at a reduced cost to those who are determined to be eligible through [facility]’s financial assistance program. Information regarding [facility]’s financial assistance program can be found by going to [email or web address] or by calling [phone].

## 2025-04-15 NOTE — ED PROVIDER NOTE - WR ORDER ID 1
"Time reflects when diagnosis was documented in both MDM as applicable and the Disposition within this note       Time User Action Codes Description Comment    4/15/2025  1:15 PM Doron Macario Add [M54.2] Neck pain     4/15/2025  1:15 PM Doron Macario Add [R52] Intractable pain     4/15/2025  1:33 PM Juan Redman Add [R51.9] Headache     4/15/2025  3:12 PM JOSEPH'MelvinSilvio Add [M54.2,  G89.29] Chronic neck pain     4/15/2025  5:19 PM Amado Chacon Add [Z13.9] Encounter for screening involving social determinants of health (SDoH)           ED Disposition       ED Disposition   Admit    Condition   Stable    Date/Time   Tue Apr 15, 2025  1:15 PM    Comment                  Assessment & Plan       Medical Decision Making  Patient is a 41 y.o. male with PMH of chronic neck pain, asthma, hypertension who presents to the ED with neck pain and dizziness.    Vital signs stable, afebrile.  Physical exam as above.    History and physical exam most consistent with acute on chronic pain. However, differential diagnosis included but not limited to fracture, dislocation, ACS, ligament injury, pneumonia, electrolyte abnormality.     Plan: We will order CBC, BMP, troponin, EKG, chest x-ray, CT C-spine, lidocaine patch, Tylenol, Toradol, fluid bolus, Robaxin.    View ED course above for further discussion on patient workup.     On review of previous records, patient was seen in the ED in the ED on 2/1/2025 for neck pain.  Visit reviewed..    All labs reviewed and utilized in the medical decision making process  All radiology studies independently viewed by me and interpreted by the radiologist.  I reviewed all testing with the patient.     Upon re-evaluation, patient noted no relief of pain with medications.  We will order oxycodone tablet.    Disposition: Patient admitted to [facility] for treatment of intractable pain.    Portions of the record may have been created with voice recognition software. Occasional wrong word or \"sound " "a like\" substitutions may have occurred due to the inherent limitations of voice recognition software. Read the chart carefully and recognize, using context, where substitutions have occurred.     Amount and/or Complexity of Data Reviewed  Labs: ordered. Decision-making details documented in ED Course.  Radiology: ordered. Decision-making details documented in ED Course.  ECG/medicine tests:  Decision-making details documented in ED Course.    Risk  OTC drugs.  Prescription drug management.  Decision regarding hospitalization.        ED Course as of 04/15/25 2142   Tue Apr 15, 2025   1046 ECG 12 lead  Procedure Note: EKG  Date/Time: 04/15/25 10:47 AM   Interpreted by: KELI MORALES D.O.  Indications / Diagnosis: Dizziness  ECG reviewed by me, the ED Provider: yes   The EKG demonstrates: unchanged from previous tracings, sinus tachycardia  Rhythm: sinus tachycardia  Intervals: normal intervals  Axis: normal axis  QRS/Blocks: normal QRS  ST Changes: No acute ST Changes, no STD/BACILIO.    1058 CBC and differential  Reassuring   1100 XR chest 2 views  No acute cardiopulmonary disease on my read   1117 Basic metabolic panel(!)  Reassuring   1126 hs TnI 0hr: 4  Reassuring   1144 Patient reevaluated, patient notes pain is the same as before.   1205 CT spine cervical without contrast  No cervical spine fracture or traumatic malalignment.       Medications   lidocaine (LIDODERM) 5 % patch 1 patch (1 patch Topical Medication Applied 4/15/25 1045)   acetaminophen (TYLENOL) tablet 975 mg (has no administration in time range)   predniSONE tablet 60 mg (has no administration in time range)     Followed by   predniSONE tablet 40 mg (has no administration in time range)     Followed by   predniSONE tablet 20 mg (has no administration in time range)   gabapentin (NEURONTIN) capsule 100 mg (100 mg Oral Given 4/15/25 1518)   ketorolac (TORADOL) injection 15 mg (has no administration in time range)     Followed by   ibuprofen (MOTRIN) " tablet 600 mg (has no administration in time range)   oxyCODONE (ROXICODONE) split tablet 2.5 mg (has no administration in time range)     Or   oxyCODONE (ROXICODONE) IR tablet 5 mg (has no administration in time range)   naloxone (NARCAN) 0.04 mg/mL syringe 0.04 mg (has no administration in time range)   methocarbamol (ROBAXIN) tablet 750 mg (has no administration in time range)   senna-docusate sodium (SENOKOT S) 8.6-50 mg per tablet 1 tablet (has no administration in time range)   polyethylene glycol (MIRALAX) packet 17 g (17 g Oral Not Given 4/15/25 1518)   ondansetron (ZOFRAN) injection 4 mg (has no administration in time range)   Diclofenac Sodium (VOLTAREN) 1 % topical gel 2 g (has no administration in time range)   acetaminophen (TYLENOL) tablet 650 mg (650 mg Oral Given 4/15/25 1044)   ketorolac (TORADOL) injection 15 mg (15 mg Intravenous Given 4/15/25 1044)   methocarbamol (ROBAXIN) tablet 500 mg (500 mg Oral Given 4/15/25 1044)   multi-electrolyte (Plasmalyte-A/Isolyte-S PH 7.4/Normosol-R) IV bolus 1,000 mL (0 mL Intravenous Stopped 4/15/25 1321)   oxyCODONE (ROXICODONE) IR tablet 5 mg (5 mg Oral Given 4/15/25 1226)       ED Risk Strat Scores                    No data recorded        SBIRT 22yo+      Flowsheet Row Most Recent Value   Initial Alcohol Screen: US AUDIT-C     1. How often do you have a drink containing alcohol? 0 Filed at: 04/15/2025 1035   2. How many drinks containing alcohol do you have on a typical day you are drinking?  0 Filed at: 04/15/2025 1035   3a. Male UNDER 65: How often do you have five or more drinks on one occasion? 0 Filed at: 04/15/2025 1035   3b. FEMALE Any Age, or MALE 65+: How often do you have 4 or more drinks on one occassion? 0 Filed at: 04/15/2025 1035   Audit-C Score 0 Filed at: 04/15/2025 1035   YANELI: How many times in the past year have you...    Used an illegal drug or used a prescription medication for non-medical reasons? Never Filed at: 04/15/2025 1030                             History of Present Illness       Chief Complaint   Patient presents with    Neck Pain     C/o of neck pain since last night     Dizziness     Started last night states room spinning took hit of thc pen before arriving denies other alc. Or drug use.          Past Medical History:   Diagnosis Date    Asthma     Chronic pain     Hypertension     Neck pain     Psychoactive substance-induced psychosis (HCC)     Thyroglossal duct cyst       Past Surgical History:   Procedure Laterality Date    THYROGLOSSAL DUCT EXCISION      THYROID SURGERY      THYROID SURGERY        Family History   Problem Relation Age of Onset    Hypertension Mother     No Known Problems Father       Social History     Tobacco Use    Smoking status: Every Day     Current packs/day: 0.50     Types: Cigarettes    Smokeless tobacco: Never    Tobacco comments:     5 cigerettes a day    Vaping Use    Vaping status: Former    Substances: Nicotine, Flavoring   Substance Use Topics    Alcohol use: Yes     Comment: occas    Drug use: Not Currently     Types: Marijuana     Comment: last used 09/2024      E-Cigarette/Vaping    E-Cigarette Use Former User     Cartridges/Day 1       E-Cigarette/Vaping Substances    Nicotine Yes     THC No     CBD No     Flavoring Yes     Other No     Unknown No       I have reviewed and agree with the history as documented.     Patient is a 41-year-old male with a past medical history of chronic neck pain, asthma, hypertension who presents today with neck pain and dizziness.  The patient states that about 3 weeks ago he had a fall which he attributes to worsening neck pain.  The patient states that even before the fall his pain has continued to increase.  Patient states that recently he has been unable to walk in a straight line, is having worsening paresthesias in his bilateral upper extremities, and overall feels his symptoms are getting worse.  Patient states that he has been told in the past that he needs  to have fusion of his cervical vertebrae, but has not complied with follow-ups with neurosurgery.  Patient states that he is on chronic pain medications, but those have not been controlling his pain.  Patient states that over the past year or so he has also had dizziness and lightheadedness.  Patient denies any chest pain, shortness of breath, abdominal pain, nausea, vomiting, diarrhea, constipation, headache, vision changes.        Review of Systems   Constitutional:  Negative for chills and fever.   HENT:  Negative for ear pain and sore throat.    Eyes:  Negative for pain and visual disturbance.   Respiratory:  Negative for cough and shortness of breath.    Cardiovascular:  Negative for chest pain and palpitations.   Gastrointestinal:  Negative for abdominal pain, constipation, diarrhea, nausea and vomiting.   Genitourinary:  Negative for dysuria and hematuria.   Musculoskeletal:  Positive for neck pain. Negative for arthralgias and back pain.   Skin:  Negative for color change and rash.   Neurological:  Positive for dizziness and light-headedness. Negative for seizures and syncope.   All other systems reviewed and are negative.          Objective       ED Triage Vitals   Temperature Pulse Blood Pressure Respirations SpO2 Patient Position - Orthostatic VS   04/15/25 1002 04/15/25 1002 04/15/25 1002 04/15/25 1002 04/15/25 1002 --   98.3 °F (36.8 °C) (!) 113 148/91 18 98 %       Temp src Heart Rate Source BP Location FiO2 (%) Pain Score    -- 04/15/25 1002 -- -- 04/15/25 1044     Monitor   8      Vitals      Date and Time Temp Pulse SpO2 Resp BP Pain Score FACES Pain Rating User   04/15/25 2112 -- -- -- -- -- 8 --    04/15/25 2052 98.6 °F (37 °C) 59 99 % -- 153/91 -- -- DII   04/15/25 2000 -- -- 94 % -- -- 8 -- KH   04/15/25 1734 -- -- -- -- -- 9 -- BP   04/15/25 1713 -- -- -- -- -- 9 -- BP   04/15/25 1657 -- -- -- -- -- 9 -- BP   04/15/25 1649 -- 109 98 % 16 167/91 -- -- DII   04/15/25 1226 -- -- -- -- -- 9 -- PARTH    04/15/25 1044 -- -- -- -- -- 8 -- JS   04/15/25 1002 98.3 °F (36.8 °C) 113 98 % 18 148/91 -- -- FR            Physical Exam  Vitals and nursing note reviewed.   Constitutional:       General: He is not in acute distress.     Appearance: Normal appearance. He is well-developed. He is not ill-appearing.   HENT:      Head: Normocephalic and atraumatic.      Right Ear: Tympanic membrane, ear canal and external ear normal.      Left Ear: Tympanic membrane, ear canal and external ear normal.      Nose: Nose normal.      Mouth/Throat:      Mouth: Mucous membranes are moist.      Pharynx: Oropharynx is clear.   Eyes:      General: No visual field deficit.     Extraocular Movements: Extraocular movements intact.      Conjunctiva/sclera: Conjunctivae normal.      Pupils: Pupils are equal, round, and reactive to light.   Neck:      Comments: Patient limiting range of motion in his neck due to pain.  Patient is able to flex and extend his neck, but refused to look side-to-side.  Cardiovascular:      Rate and Rhythm: Normal rate and regular rhythm.      Pulses: Normal pulses.      Heart sounds: Normal heart sounds. No murmur heard.     No friction rub. No gallop.   Pulmonary:      Effort: Pulmonary effort is normal. No respiratory distress.      Breath sounds: Normal breath sounds. No stridor. No wheezing, rhonchi or rales.   Abdominal:      General: Abdomen is flat. Bowel sounds are normal. There is no distension.      Palpations: Abdomen is soft. There is no mass.      Tenderness: There is no abdominal tenderness. There is no guarding or rebound.   Musculoskeletal:         General: No swelling. Normal range of motion.      Cervical back: Normal range of motion and neck supple. Tenderness present. No rigidity.   Skin:     General: Skin is warm and dry.      Capillary Refill: Capillary refill takes less than 2 seconds.   Neurological:      General: No focal deficit present.      Mental Status: He is alert and oriented to  person, place, and time.      GCS: GCS eye subscore is 4. GCS verbal subscore is 5. GCS motor subscore is 6.      Cranial Nerves: Cranial nerves 2-12 are intact. No cranial nerve deficit or facial asymmetry.      Motor: Motor function is intact.      Coordination: Coordination is intact. Finger-Nose-Finger Test and Heel to Shin Test normal.      Comments: Patient notes paresthesias in the ulnar distribution of bilateral upper extremities.   Psychiatric:         Mood and Affect: Mood normal.         Results Reviewed       Procedure Component Value Units Date/Time    HS Troponin 0hr (reflex protocol) [[id number]]  (Normal) Collected: 04/15/25 1041    Lab Status: Final result Specimen: Blood from Arm, Left Updated: 04/15/25 1125     hs TnI 0hr 4 ng/L     Basic metabolic panel [[id number]]  (Abnormal) Collected: 04/15/25 1041    Lab Status: Final result Specimen: Blood from Arm, Left Updated: 04/15/25 1116     Sodium 137 mmol/L      Potassium 3.7 mmol/L      Chloride 106 mmol/L      CO2 19 mmol/L      ANION GAP 12 mmol/L      BUN 18 mg/dL      Creatinine 1.14 mg/dL      Glucose 91 mg/dL      Calcium 9.3 mg/dL      eGFR 79 ml/min/1.73sq m     Narrative:      [employer] guidelines for Chronic Kidney Disease (CKD):     Stage 1 with normal or high GFR (GFR > 90 mL/min/1.73 square meters)    Stage 2 Mild CKD (GFR = 60-89 mL/min/1.73 square meters)    Stage 3A Moderate CKD (GFR = 45-59 mL/min/1.73 square meters)    Stage 3B Moderate CKD (GFR = 30-44 mL/min/1.73 square meters)    Stage 4 Severe CKD (GFR = 15-29 mL/min/1.73 square meters)    Stage 5 End Stage CKD (GFR <15 mL/min/1.73 square meters)  Note: GFR calculation is accurate only with a steady state creatinine    CBC and differential [[id number]] Collected: 04/15/25 1041    Lab Status: Final result Specimen: Blood from Arm, Left Updated: 04/15/25 1056     WBC 6.51 Thousand/uL      RBC 4.99 Million/uL      Hemoglobin 15.6 g/dL      Hematocrit 44.0 %       MCV 88 fL      MCH 31.3 pg      MCHC 35.5 g/dL      RDW 13.9 %      MPV 10.0 fL      Platelets 259 Thousands/uL      nRBC 0 /100 WBCs      Segmented % 70 %      Immature Grans % 1 %      Lymphocytes % 16 %      Monocytes % 11 %      Eosinophils Relative 1 %      Basophils Relative 1 %      Absolute Neutrophils 4.56 Thousands/µL      Absolute Immature Grans 0.03 Thousand/uL      Absolute Lymphocytes 1.07 Thousands/µL      Absolute Monocytes 0.69 Thousand/µL      Eosinophils Absolute 0.09 Thousand/µL      Basophils Absolute 0.07 Thousands/µL             CT spine cervical without contrast   Final Interpretation by Navi Bruce MD (04/15 9008)      No cervical spine fracture or traumatic malalignment.                  Workstation performed: [id number]         XR chest 2 views   Final Interpretation by Lucy Russ MD (04/15 8410)      No acute cardiopulmonary disease.            Workstation performed: [id number]         MRI cervical spine wo contrast    (Results Pending)   MRI lumbar spine wo contrast    (Results Pending)       Procedures    ED Medication and Procedure Management   Prior to Admission Medications   Prescriptions Last Dose Informant Patient Reported? Taking?   QUEtiapine (SEROquel) 300 mg tablet   No No   Sig: Take 1 tablet (300 mg total) by mouth daily at bedtime for 14 days   cyclobenzaprine (FLEXERIL) 10 mg tablet   No No   Sig: Take 1 tablet (10 mg total) by mouth 2 (two) times a day as needed for muscle spasms for up to 14 days   gabapentin (Neurontin) 300 mg capsule   No No   Sig: Take 1 capsule (300 mg total) by mouth 3 (three) times a day   gabapentin (Neurontin) 300 mg capsule   No No   Sig: Take 1 capsule (300 mg total) by mouth 3 (three) times a day for 7 days For post-herpetic neuralgia: Take 1 tablet on day 1,  Then take 2 tablets on day 2, Then take 3 tablets on day 3 and every day after that as instructed by your doctor.   gabapentin (Neurontin) 300 mg capsule    No No   Sig: Take 1 capsule (300 mg total) by mouth 3 (three) times a day for 14 days For post-herpetic neuralgia: Take 1 tablet on day 1,  Then take 2 tablets on day 2, Then take 3 tablets on day 3 and every day after that as instructed by your doctor.   ibuprofen (MOTRIN) 400 mg tablet   No No   Sig: Take 1 tablet (400 mg total) by mouth every 6 (six) hours as needed for moderate pain   methocarbamol (ROBAXIN) 750 mg tablet   No No   Sig: Take 1 tablet (750 mg total) by mouth 3 (three) times a day   topiramate (Topamax) 25 mg tablet   No No   Sig: Take 1 tablet (25 mg total) by mouth 2 (two) times a day for 7 days      Facility-Administered Medications: None     Current Discharge Medication List        CONTINUE these medications which have NOT CHANGED    Details   cyclobenzaprine (FLEXERIL) 10 mg tablet Take 1 tablet (10 mg total) by mouth 2 (two) times a day as needed for muscle spasms for up to 14 days  Qty: 20 tablet, Refills: 0    Associated Diagnoses: Cervical stenosis of spine      gabapentin (Neurontin) 300 mg capsule Take 1 capsule (300 mg total) by mouth 3 (three) times a day  Qty: 90 capsule, Refills: 1    Associated Diagnoses: Cervical radiculopathy      ibuprofen (MOTRIN) 400 mg tablet Take 1 tablet (400 mg total) by mouth every 6 (six) hours as needed for moderate pain  Qty: 20 tablet, Refills: 0    Associated Diagnoses: Neck pain      methocarbamol (ROBAXIN) 750 mg tablet Take 1 tablet (750 mg total) by mouth 3 (three) times a day  Qty: 21 tablet, Refills: 0    Associated Diagnoses: Neck pain      QUEtiapine (SEROquel) 300 mg tablet Take 1 tablet (300 mg total) by mouth daily at bedtime for 14 days  Qty: 14 tablet, Refills: 0    Associated Diagnoses: Psychoactive substance-induced psychosis (HCC)      topiramate (Topamax) 25 mg tablet Take 1 tablet (25 mg total) by mouth 2 (two) times a day for 7 days  Qty: 14 tablet, Refills: 0    Associated Diagnoses: Chronic neck pain             ED SEPSIS  DOCUMENTATION   Time reflects when diagnosis was documented in both MDM as applicable and the Disposition within this note       Time User Action Codes Description Comment    4/15/2025  1:15 PM Doron Macario [M54.2] Neck pain     4/15/2025  1:15 PM Doron Macario Add [R52] Intractable pain     4/15/2025  1:33 PM Juan Redman Add [R51.9] Headache     4/15/2025  3:12 PM Silvio Heath Add [M54.2,  G89.29] Chronic neck pain     4/15/2025  5:19 PM Amado Chacon Add [Z13.9] Encounter for screening involving social determinants of health (SDoH)                  Doron Macario DO  04/15/25 [id number]     [id number]

## 2025-04-29 ENCOUNTER — OUTPATIENT (OUTPATIENT)
Dept: OUTPATIENT SERVICES | Facility: HOSPITAL | Age: 55
LOS: 1 days | End: 2025-04-29
Payer: MEDICAID

## 2025-04-29 ENCOUNTER — APPOINTMENT (OUTPATIENT)
Dept: MRI IMAGING | Facility: CLINIC | Age: 55
End: 2025-04-29
Payer: MEDICAID

## 2025-04-29 DIAGNOSIS — Z98.890 OTHER SPECIFIED POSTPROCEDURAL STATES: Chronic | ICD-10-CM

## 2025-04-29 DIAGNOSIS — Z00.8 ENCOUNTER FOR OTHER GENERAL EXAMINATION: ICD-10-CM

## 2025-04-29 PROCEDURE — A9585: CPT

## 2025-04-29 PROCEDURE — 70553 MRI BRAIN STEM W/O & W/DYE: CPT

## 2025-04-29 PROCEDURE — 70553 MRI BRAIN STEM W/O & W/DYE: CPT | Mod: 26

## 2025-05-19 NOTE — ED PROVIDER NOTE - NS ED MD DISPO ADMITTING SERVICE
Recommendations:  1. Continue current medications.  2. Check BP 2x/week.  If consistently greater than 140, call office.   3. Follow up in 12 months.   
Spontaneous, unlabored and symmetrical
MED

## 2025-05-22 ENCOUNTER — TRANSCRIPTION ENCOUNTER (OUTPATIENT)
Age: 55
End: 2025-05-22

## 2025-06-30 ENCOUNTER — NON-APPOINTMENT (OUTPATIENT)
Age: 55
End: 2025-06-30

## 2025-07-01 ENCOUNTER — APPOINTMENT (OUTPATIENT)
Dept: PULMONOLOGY | Facility: CLINIC | Age: 55
End: 2025-07-01
Payer: MEDICAID

## 2025-07-01 VITALS
HEART RATE: 75 BPM | HEIGHT: 75 IN | TEMPERATURE: 97.5 F | WEIGHT: 198 LBS | DIASTOLIC BLOOD PRESSURE: 78 MMHG | BODY MASS INDEX: 24.62 KG/M2 | OXYGEN SATURATION: 98 % | RESPIRATION RATE: 16 BRPM | SYSTOLIC BLOOD PRESSURE: 120 MMHG

## 2025-07-01 PROCEDURE — 94729 DIFFUSING CAPACITY: CPT

## 2025-07-01 PROCEDURE — 94010 BREATHING CAPACITY TEST: CPT

## 2025-07-01 PROCEDURE — 94727 GAS DIL/WSHOT DETER LNG VOL: CPT

## 2025-07-01 PROCEDURE — 99214 OFFICE O/P EST MOD 30 MIN: CPT | Mod: 25

## 2025-07-01 PROCEDURE — 95012 NITRIC OXIDE EXP GAS DETER: CPT

## 2025-07-08 ENCOUNTER — APPOINTMENT (OUTPATIENT)
Dept: CT IMAGING | Facility: IMAGING CENTER | Age: 55
End: 2025-07-08
Payer: MEDICAID

## 2025-07-08 ENCOUNTER — OUTPATIENT (OUTPATIENT)
Dept: OUTPATIENT SERVICES | Facility: HOSPITAL | Age: 55
LOS: 1 days | End: 2025-07-08
Payer: MEDICAID

## 2025-07-08 DIAGNOSIS — R93.89 ABNORMAL FINDINGS ON DIAGNOSTIC IMAGING OF OTHER SPECIFIED BODY STRUCTURES: ICD-10-CM

## 2025-07-08 DIAGNOSIS — Z98.890 OTHER SPECIFIED POSTPROCEDURAL STATES: Chronic | ICD-10-CM

## 2025-07-08 DIAGNOSIS — Z00.8 ENCOUNTER FOR OTHER GENERAL EXAMINATION: ICD-10-CM

## 2025-07-08 PROCEDURE — 71250 CT THORAX DX C-: CPT

## 2025-07-08 PROCEDURE — 71250 CT THORAX DX C-: CPT | Mod: 26

## 2025-07-22 ENCOUNTER — TRANSCRIPTION ENCOUNTER (OUTPATIENT)
Age: 55
End: 2025-07-22

## 2025-08-08 ENCOUNTER — LABORATORY RESULT (OUTPATIENT)
Age: 55
End: 2025-08-08

## 2025-08-08 ENCOUNTER — APPOINTMENT (OUTPATIENT)
Dept: RHEUMATOLOGY | Facility: CLINIC | Age: 55
End: 2025-08-08
Payer: MEDICAID

## 2025-08-08 VITALS
WEIGHT: 200 LBS | HEART RATE: 93 BPM | BODY MASS INDEX: 24.87 KG/M2 | DIASTOLIC BLOOD PRESSURE: 80 MMHG | HEIGHT: 75 IN | OXYGEN SATURATION: 97 % | SYSTOLIC BLOOD PRESSURE: 114 MMHG

## 2025-08-08 DIAGNOSIS — G45.9 TRANSIENT CEREBRAL ISCHEMIC ATTACK, UNSPECIFIED: ICD-10-CM

## 2025-08-08 PROCEDURE — G2211 COMPLEX E/M VISIT ADD ON: CPT | Mod: NC

## 2025-08-08 PROCEDURE — 99215 OFFICE O/P EST HI 40 MIN: CPT

## 2025-08-08 RX ORDER — ENOXAPARIN SODIUM 40 MG/.4ML
40 INJECTION SUBCUTANEOUS
Refills: 0 | Status: ACTIVE | COMMUNITY
Start: 2025-08-08

## 2025-08-08 RX ORDER — LACOSAMIDE 200 MG/1
200 TABLET ORAL TWICE DAILY
Refills: 0 | Status: ACTIVE | COMMUNITY
Start: 2025-08-08

## 2025-08-11 ENCOUNTER — TRANSCRIPTION ENCOUNTER (OUTPATIENT)
Age: 55
End: 2025-08-11

## 2025-08-11 ENCOUNTER — NON-APPOINTMENT (OUTPATIENT)
Age: 55
End: 2025-08-11

## 2025-08-11 DIAGNOSIS — D89.1 CRYOGLOBULINEMIA: ICD-10-CM

## 2025-08-11 LAB
24R-OH-CALCIDIOL SERPL-MCNC: 33 PG/ML
25(OH)D3 SERPL-MCNC: 37.1 NG/ML
ACE BLD-CCNC: 33 U/L
ACE BLD-CCNC: 33 U/L
ALBUMIN SERPL ELPH-MCNC: 4.4 G/DL
ALP BLD-CCNC: 81 U/L
ALT SERPL-CCNC: 29 U/L
ANA SCREEN BY IMMUNOASSAY: NEGATIVE
ANCA AB SER-IMP: NEGATIVE
ANION GAP SERPL CALC-SCNC: 14 MMOL/L
APPEARANCE: CLEAR
ASO AB SER LA-ACNC: 77 IU/ML
AST SERPL-CCNC: 20 U/L
BACTERIA: NEGATIVE /HPF
BASOPHILS # BLD AUTO: 0.08 K/UL
BASOPHILS NFR BLD AUTO: 1.2 %
BILIRUB SERPL-MCNC: 0.2 MG/DL
BILIRUBIN URINE: NEGATIVE
BLOOD URINE: NEGATIVE
BUN SERPL-MCNC: 17 MG/DL
C-ANCA SER-ACNC: NEGATIVE
CALCIUM SERPL-MCNC: 9.4 MG/DL
CAST: 1 /LPF
CCP AB SER IA-ACNC: <8 U/ML
CCP AB SER QL: NEGATIVE
CENTROMERE B AB SER-ACNC: <0.2 AL
CHLORIDE SERPL-SCNC: 105 MMOL/L
CHROMATIN AB SERPL-ACNC: <0.2 AL
CO2 SERPL-SCNC: 23 MMOL/L
COLOR: NORMAL
CREAT SERPL-MCNC: 1.06 MG/DL
CREAT SPEC-SCNC: 415 MG/DL
CREAT/PROT UR: 0.1 RATIO
CRP SERPL-MCNC: <3 MG/L
DSDNA AB SER-ACNC: <1 IU/ML
EGFRCR SERPLBLD CKD-EPI 2021: 83 ML/MIN/1.73M2
ENA JO1 AB SER-ACNC: <0.2 AL
ENA RNP AB SER-ACNC: <0.2 AL
ENA SCL70 AB SER-ACNC: <0.2 AL
ENA SM AB SER-ACNC: <0.2 AL
ENA SS-A AB SER-ACNC: <0.2 AL
ENA SS-B AB SER-ACNC: <0.2 AL
EOSINOPHIL # BLD AUTO: 0.4 K/UL
EOSINOPHIL NFR BLD AUTO: 6 %
EPITHELIAL CELLS: 0 /HPF
ERYTHROCYTE [SEDIMENTATION RATE] IN BLOOD BY WESTERGREN METHOD: 6 MM/HR
GLUCOSE QUALITATIVE U: NEGATIVE MG/DL
GLUCOSE SERPL-MCNC: 101 MG/DL
HAV IGM SER QL: NONREACTIVE
HBV CORE IGG+IGM SER QL: NONREACTIVE
HBV CORE IGM SER QL: NONREACTIVE
HBV SURFACE AG SER QL: NONREACTIVE
HCT VFR BLD CALC: 43.9 %
HCV AB SER QL: NONREACTIVE
HCV S/CO RATIO: 0.07 S/CO
HGB BLD-MCNC: 14.1 G/DL
IGG SERPL-MCNC: 994 MG/DL
IGG1 SER-MCNC: 556 MG/DL
IGG2 SER-MCNC: 339 MG/DL
IGG3 SER-MCNC: 7.4 MG/DL
IGG4 SER-MCNC: 49.8 MG/DL
IMM GRANULOCYTES NFR BLD AUTO: 0.6 %
KETONES URINE: ABNORMAL MG/DL
LEUKOCYTE ESTERASE URINE: NEGATIVE
LUPUS ANTICOAGULANT CASCADE REFLEX: NORMAL
LYMPHOCYTES # BLD AUTO: 1.17 K/UL
LYMPHOCYTES NFR BLD AUTO: 17.4 %
MAN DIFF?: NORMAL
MCHC RBC-ENTMCNC: 30 PG
MCHC RBC-ENTMCNC: 32.1 G/DL
MCV RBC AUTO: 93.4 FL
MICROSCOPIC-UA: NORMAL
MONOCYTES # BLD AUTO: 0.52 K/UL
MONOCYTES NFR BLD AUTO: 7.7 %
MYELOPEROXIDASE AB SER QL IA: <0.2 AL
MYELOPEROXIDASE CELLS FLD QL: NEGATIVE
NEUTROPHILS # BLD AUTO: 4.5 K/UL
NEUTROPHILS NFR BLD AUTO: 67.1 %
NITRITE URINE: NEGATIVE
P-ANCA TITR SER IF: NEGATIVE
PH URINE: 6.5
PLATELET # BLD AUTO: 197 K/UL
POTASSIUM SERPL-SCNC: 4.5 MMOL/L
PROT SERPL-MCNC: 7.2 G/DL
PROT UR-MCNC: 44 MG/DL
PROTEIN URINE: 30 MG/DL
PROTEINASE3 AB SER IA-ACNC: <0.2 AL
PROTEINASE3 AB SER-ACNC: NEGATIVE
RBC # BLD: 4.7 M/UL
RBC # FLD: 13.9 %
RED BLOOD CELLS URINE: 5 /HPF
RHEUMATOID FACT SERPL-ACNC: <10 IU/ML
RIBOSOMAL P AB SER-ACNC: <0.2 AL
SODIUM SERPL-SCNC: 141 MMOL/L
SPECIFIC GRAVITY URINE: >1.03
UROBILINOGEN URINE: 0.2 MG/DL
WBC # FLD AUTO: 6.71 K/UL
WHITE BLOOD CELLS URINE: 0 /HPF

## 2025-08-12 LAB
ALBUMIN MFR SERPL ELPH: 55.3 %
ALBUMIN SERPL-MCNC: 4 G/DL
ALBUMIN/GLOB SERPL: 1.2 RATIO
ALPHA1 GLOB MFR SERPL ELPH: 4.8 %
ALPHA1 GLOB SERPL ELPH-MCNC: 0.4 G/DL
ALPHA2 GLOB MFR SERPL ELPH: 11 %
ALPHA2 GLOB SERPL ELPH-MCNC: 0.8 G/DL
B-GLOBULIN MFR SERPL ELPH: 13.7 %
B-GLOBULIN SERPL ELPH-MCNC: 1 G/DL
CRYOCRIT SER SPUN WESTERGREN: 0 MM
CRYOFIB BLD-MCNC: NEGATIVE
CRYOGLOB SERPL-MCNC: NEGATIVE
DEPRECATED KAPPA LC FREE/LAMBDA SER: 1.33 RATIO
GAMMA GLOB FLD ELPH-MCNC: 1.1 G/DL
GAMMA GLOB MFR SERPL ELPH: 15.2 %
IGA SERPL-MCNC: 220 MG/DL
IGG SERPL-MCNC: 1088 MG/DL
IGM SERPL-MCNC: 54 MG/DL
INTERPRETATION SERPL IEP-IMP: NORMAL
KAPPA LC CSF-MCNC: 1.08 MG/DL
KAPPA LC SERPL-MCNC: 1.44 MG/DL
M PROTEIN SPEC IFE-MCNC: NORMAL
M TB IFN-G BLD-IMP: NEGATIVE
PROT SERPL-MCNC: 7.3 G/DL
PROT SERPL-MCNC: 7.3 G/DL
QUANTIFERON TB PLUS MITOGEN MINUS NIL: 6.34 IU/ML
QUANTIFERON TB PLUS NIL: 0.02 IU/ML
QUANTIFERON TB PLUS TB1 MINUS NIL: 0 IU/ML
QUANTIFERON TB PLUS TB2 MINUS NIL: 0 IU/ML

## 2025-08-14 DIAGNOSIS — G40.909 EPILEPSY, UNSPECIFIED, NOT INTRACTABLE, W/OUT STATUS EPILEPTICUS: ICD-10-CM

## 2025-08-14 DIAGNOSIS — R41.89 OTHER SYMPTOMS AND SIGNS INVOLVING COGNITIVE FUNCTIONS AND AWARENESS: ICD-10-CM

## 2025-08-19 ENCOUNTER — APPOINTMENT (OUTPATIENT)
Dept: OPHTHALMOLOGY | Facility: CLINIC | Age: 55
End: 2025-08-19
Payer: MEDICAID

## 2025-08-19 ENCOUNTER — NON-APPOINTMENT (OUTPATIENT)
Age: 55
End: 2025-08-19

## 2025-08-19 PROCEDURE — 92201 OPSCPY EXTND RTA DRAW UNI/BI: CPT

## 2025-08-19 PROCEDURE — 92134 CPTRZ OPH DX IMG PST SGM RTA: CPT

## 2025-08-19 PROCEDURE — 92014 COMPRE OPH EXAM EST PT 1/>: CPT

## 2025-08-22 ENCOUNTER — EMERGENCY (EMERGENCY)
Facility: HOSPITAL | Age: 55
LOS: 1 days | End: 2025-08-22
Attending: STUDENT IN AN ORGANIZED HEALTH CARE EDUCATION/TRAINING PROGRAM
Payer: MEDICAID

## 2025-08-22 VITALS
HEIGHT: 75 IN | RESPIRATION RATE: 18 BRPM | OXYGEN SATURATION: 96 % | SYSTOLIC BLOOD PRESSURE: 137 MMHG | TEMPERATURE: 98 F | DIASTOLIC BLOOD PRESSURE: 91 MMHG | WEIGHT: 220.02 LBS | HEART RATE: 89 BPM

## 2025-08-22 DIAGNOSIS — Z98.890 OTHER SPECIFIED POSTPROCEDURAL STATES: Chronic | ICD-10-CM

## 2025-08-22 LAB
APTT BLD: 30.8 SEC — SIGNIFICANT CHANGE UP (ref 26.1–36.8)
ETHANOL SERPL-MCNC: <10 MG/DL — SIGNIFICANT CHANGE UP (ref 0–10)
INR BLD: 0.9 RATIO — SIGNIFICANT CHANGE UP (ref 0.85–1.16)
PROTHROM AB SERPL-ACNC: 10.5 SEC — SIGNIFICANT CHANGE UP (ref 9.9–13.4)

## 2025-08-22 PROCEDURE — 93010 ELECTROCARDIOGRAM REPORT: CPT

## 2025-08-22 PROCEDURE — 99285 EMERGENCY DEPT VISIT HI MDM: CPT

## 2025-08-23 VITALS
RESPIRATION RATE: 18 BRPM | OXYGEN SATURATION: 99 % | DIASTOLIC BLOOD PRESSURE: 81 MMHG | HEART RATE: 65 BPM | TEMPERATURE: 98 F | SYSTOLIC BLOOD PRESSURE: 116 MMHG

## 2025-08-23 DIAGNOSIS — F43.22 ADJUSTMENT DISORDER WITH ANXIETY: ICD-10-CM

## 2025-08-23 LAB
ALBUMIN SERPL ELPH-MCNC: 4.4 G/DL — SIGNIFICANT CHANGE UP (ref 3.3–5)
ALP SERPL-CCNC: 78 U/L — SIGNIFICANT CHANGE UP (ref 40–120)
ALT FLD-CCNC: 23 U/L — SIGNIFICANT CHANGE UP (ref 10–45)
ANION GAP SERPL CALC-SCNC: 13 MMOL/L — SIGNIFICANT CHANGE UP (ref 5–17)
APAP SERPL-MCNC: <15 UG/ML — SIGNIFICANT CHANGE UP (ref 10–30)
AST SERPL-CCNC: 17 U/L — SIGNIFICANT CHANGE UP (ref 10–40)
BASOPHILS # BLD AUTO: 0.07 K/UL — SIGNIFICANT CHANGE UP (ref 0–0.2)
BASOPHILS NFR BLD AUTO: 0.8 % — SIGNIFICANT CHANGE UP (ref 0–2)
BILIRUB SERPL-MCNC: 0.2 MG/DL — SIGNIFICANT CHANGE UP (ref 0.2–1.2)
BUN SERPL-MCNC: 18 MG/DL — SIGNIFICANT CHANGE UP (ref 7–23)
CALCIUM SERPL-MCNC: 9.3 MG/DL — SIGNIFICANT CHANGE UP (ref 8.4–10.5)
CHLORIDE SERPL-SCNC: 104 MMOL/L — SIGNIFICANT CHANGE UP (ref 96–108)
CO2 SERPL-SCNC: 24 MMOL/L — SIGNIFICANT CHANGE UP (ref 22–31)
CREAT SERPL-MCNC: 1.11 MG/DL — SIGNIFICANT CHANGE UP (ref 0.5–1.3)
EGFR: 78 ML/MIN/1.73M2 — SIGNIFICANT CHANGE UP
EGFR: 78 ML/MIN/1.73M2 — SIGNIFICANT CHANGE UP
EOSINOPHIL # BLD AUTO: 0.29 K/UL — SIGNIFICANT CHANGE UP (ref 0–0.5)
EOSINOPHIL NFR BLD AUTO: 3.3 % — SIGNIFICANT CHANGE UP (ref 0–6)
GLUCOSE SERPL-MCNC: 101 MG/DL — HIGH (ref 70–99)
HCT VFR BLD CALC: 40.8 % — SIGNIFICANT CHANGE UP (ref 39–50)
HGB BLD-MCNC: 13.2 G/DL — SIGNIFICANT CHANGE UP (ref 13–17)
IMM GRANULOCYTES # BLD AUTO: 0.05 K/UL — SIGNIFICANT CHANGE UP (ref 0–0.07)
IMM GRANULOCYTES NFR BLD AUTO: 0.6 % — SIGNIFICANT CHANGE UP (ref 0–0.9)
LYMPHOCYTES # BLD AUTO: 0.83 K/UL — LOW (ref 1–3.3)
LYMPHOCYTES NFR BLD AUTO: 9.5 % — LOW (ref 13–44)
MCHC RBC-ENTMCNC: 29.4 PG — SIGNIFICANT CHANGE UP (ref 27–34)
MCHC RBC-ENTMCNC: 32.4 G/DL — SIGNIFICANT CHANGE UP (ref 32–36)
MCV RBC AUTO: 90.9 FL — SIGNIFICANT CHANGE UP (ref 80–100)
MONOCYTES # BLD AUTO: 0.57 K/UL — SIGNIFICANT CHANGE UP (ref 0–0.9)
MONOCYTES NFR BLD AUTO: 6.5 % — SIGNIFICANT CHANGE UP (ref 2–14)
NEUTROPHILS # BLD AUTO: 6.97 K/UL — SIGNIFICANT CHANGE UP (ref 1.8–7.4)
NEUTROPHILS NFR BLD AUTO: 79.3 % — HIGH (ref 43–77)
NRBC # BLD AUTO: 0 K/UL — SIGNIFICANT CHANGE UP (ref 0–0)
NRBC # FLD: 0 K/UL — SIGNIFICANT CHANGE UP (ref 0–0)
NRBC BLD AUTO-RTO: 0 /100 WBCS — SIGNIFICANT CHANGE UP (ref 0–0)
PLATELET # BLD AUTO: 175 K/UL — SIGNIFICANT CHANGE UP (ref 150–400)
PMV BLD: 13 FL — SIGNIFICANT CHANGE UP (ref 7–13)
POTASSIUM SERPL-MCNC: 4 MMOL/L — SIGNIFICANT CHANGE UP (ref 3.5–5.3)
POTASSIUM SERPL-SCNC: 4 MMOL/L — SIGNIFICANT CHANGE UP (ref 3.5–5.3)
PROT SERPL-MCNC: 7.1 G/DL — SIGNIFICANT CHANGE UP (ref 6–8.3)
RBC # BLD: 4.49 M/UL — SIGNIFICANT CHANGE UP (ref 4.2–5.8)
RBC # FLD: 13.4 % — SIGNIFICANT CHANGE UP (ref 10.3–14.5)
SALICYLATES SERPL-MCNC: <2 MG/DL — LOW (ref 15–30)
SODIUM SERPL-SCNC: 141 MMOL/L — SIGNIFICANT CHANGE UP (ref 135–145)
TSH SERPL-MCNC: 1.07 UIU/ML — SIGNIFICANT CHANGE UP (ref 0.27–4.2)
WBC # BLD: 8.78 K/UL — SIGNIFICANT CHANGE UP (ref 3.8–10.5)
WBC # FLD AUTO: 8.78 K/UL — SIGNIFICANT CHANGE UP (ref 3.8–10.5)

## 2025-08-23 PROCEDURE — 80053 COMPREHEN METABOLIC PANEL: CPT

## 2025-08-23 PROCEDURE — 99285 EMERGENCY DEPT VISIT HI MDM: CPT | Mod: 25

## 2025-08-23 PROCEDURE — 80307 DRUG TEST PRSMV CHEM ANLYZR: CPT

## 2025-08-23 PROCEDURE — 93005 ELECTROCARDIOGRAM TRACING: CPT

## 2025-08-23 PROCEDURE — 85025 COMPLETE CBC W/AUTO DIFF WBC: CPT

## 2025-08-23 PROCEDURE — 84443 ASSAY THYROID STIM HORMONE: CPT

## 2025-08-23 PROCEDURE — 70450 CT HEAD/BRAIN W/O DYE: CPT | Mod: 26

## 2025-08-23 PROCEDURE — 85610 PROTHROMBIN TIME: CPT

## 2025-08-23 PROCEDURE — 70450 CT HEAD/BRAIN W/O DYE: CPT

## 2025-08-23 PROCEDURE — 36000 PLACE NEEDLE IN VEIN: CPT

## 2025-08-23 PROCEDURE — 85730 THROMBOPLASTIN TIME PARTIAL: CPT

## 2025-08-23 RX ORDER — LACOSAMIDE 150 MG/1
200 TABLET, FILM COATED ORAL ONCE
Refills: 0 | Status: DISCONTINUED | OUTPATIENT
Start: 2025-08-23 | End: 2025-08-23

## 2025-08-23 RX ORDER — LEVETIRACETAM 10 MG/ML
500 INJECTION, SOLUTION INTRAVENOUS ONCE
Refills: 0 | Status: COMPLETED | OUTPATIENT
Start: 2025-08-23 | End: 2025-08-23

## 2025-08-23 RX ADMIN — LACOSAMIDE 200 MILLIGRAM(S): 150 TABLET, FILM COATED ORAL at 01:12

## 2025-08-23 RX ADMIN — LEVETIRACETAM 500 MILLIGRAM(S): 10 INJECTION, SOLUTION INTRAVENOUS at 01:12

## (undated) DEVICE — STRYKER INTERPULSE HANDPIECE W IRR SUCTION TUBE

## (undated) DEVICE — FORCEP RADIAL JAW 4 JUMBO 2.8MM 3.2MM 240CM ORANGE DISP

## (undated) DEVICE — PACK LIJ BASIC ORTHO

## (undated) DEVICE — BIOPSY FORCEP RADIAL JAW 4 STANDARD WITH NEEDLE

## (undated) DEVICE — DRAPE LAPAROTOMY W VELCRO CORD TABS

## (undated) DEVICE — DRSG VAC GRANUFOAM LARGE (BLACK)

## (undated) DEVICE — DRSG ADAPTIC CURITY OIL EMULSION 3 X 8"

## (undated) DEVICE — CATH IV SAFE BC 20G X 1.16" (PINK)

## (undated) DEVICE — SNARE POLYP SENS SM 13MM 240CM

## (undated) DEVICE — SUT MONOCRYL 4-0 27" PS-2 UNDYED

## (undated) DEVICE — BASIN EMESIS 10IN GRADUATED MAUVE

## (undated) DEVICE — TUBING SUCTION CONN 6FT STERILE

## (undated) DEVICE — DRAPE TOWEL BLUE 17" X 24"

## (undated) DEVICE — SNARE POLYP SENS 27MM 240CM

## (undated) DEVICE — DRAPE 3/4 SHEET 52X76"

## (undated) DEVICE — SUT SILK 3-0 18" TIES

## (undated) DEVICE — ELCTR GROUNDING PAD ADULT COVIDIEN

## (undated) DEVICE — SNARE OVAL LOOP MICOR

## (undated) DEVICE — SAW BLADE STRYKER SYSTEM 6 SAGITTAL 13X1.19X90MM

## (undated) DEVICE — SALIVA EJECTOR (BLUE)

## (undated) DEVICE — SUCTION YANKAUER NO CONTROL VENT

## (undated) DEVICE — DRSG STOCKINETTE IMPERVIOUS XL

## (undated) DEVICE — WARMING BLANKET UPPER ADULT

## (undated) DEVICE — BRUSH COLONOSCOPY CYTOLOGY

## (undated) DEVICE — POLY TRAP ETRAP

## (undated) DEVICE — DRSG STERISTRIPS 0.5 X 4"

## (undated) DEVICE — SPECIMEN CONTAINER 100ML

## (undated) DEVICE — FORCEP RADIAL JAW 4 HOT BIOPSY DISP

## (undated) DEVICE — VISITEC 4X4

## (undated) DEVICE — TRAP SPECIMEN SPUTUM 40CC

## (undated) DEVICE — DRSG COMBINE 5X9"

## (undated) DEVICE — KIT POST MORTEM PEDS

## (undated) DEVICE — DRSG XEROFORM 5 X 9"

## (undated) DEVICE — POSITIONER FOAM EGG CRATE ULNAR 2PCS (PINK)

## (undated) DEVICE — LABELS BLANK W PEN

## (undated) DEVICE — BLADE 32 X 64

## (undated) DEVICE — IRRIGATOR BIO SHIELD

## (undated) DEVICE — ELCTR ECG CONDUCTIVE ADHESIVE

## (undated) DEVICE — DRSG CURITY GAUZE SPONGE 4 X 4" 12-PLY

## (undated) DEVICE — DRAPE 3/4 SHEET W REINFORCEMENT 56X77"

## (undated) DEVICE — DRSG KERLIX ROLL 4.5"

## (undated) DEVICE — SUT SILK 2-0 18" TIES

## (undated) DEVICE — DRSG 2X2

## (undated) DEVICE — GEL AQUSNC PACKET 20GR

## (undated) DEVICE — DRSG OPSITE 13.75 X 4"

## (undated) DEVICE — SOL IRR POUR NS 0.9% 500ML

## (undated) DEVICE — GOWN LG

## (undated) DEVICE — PACK IV START WITH CHG

## (undated) DEVICE — SUT PROLENE 3-0 36" SH

## (undated) DEVICE — DRSG COBAN 4"

## (undated) DEVICE — DRSG ACE BANDAGE 4"

## (undated) DEVICE — TUBING SUCTION 20FT

## (undated) DEVICE — DRSG VAC GRANUFOAM MEDIUM (BLACK)

## (undated) DEVICE — TUBING IV SET GRAVITY 3Y 100" MACRO

## (undated) DEVICE — SOL IRR POUR H2O 250ML

## (undated) DEVICE — TUBING SUCTION NONCONDUCTIVE 6MM X 12FT

## (undated) DEVICE — SENSOR O2 FINGER ADULT

## (undated) DEVICE — BLADE SCALPEL SAFETYLOCK #11

## (undated) DEVICE — VENODYNE/SCD SLEEVE CALF MEDIUM

## (undated) DEVICE — BLADE SCALPEL SAFETYLOCK #15

## (undated) DEVICE — Device

## (undated) DEVICE — PACK EXTREMITY

## (undated) DEVICE — CLAMP BX HOT RAD JAW 3

## (undated) DEVICE — WARMING BLANKET FULL UNDERBODY

## (undated) DEVICE — DRSG CURITY GAUZE SPONGE 4 X 4" 12-PLY NON-STERILE

## (undated) DEVICE — DRSG TAPE UMBILICAL COTTON 2" X 30 X 1/8"

## (undated) DEVICE — DRSG BANDAID 0.75X3"

## (undated) DEVICE — DURABLE MEDICAL EQUIPMENT: Type: DURABLE MEDICAL EQUIPMENT

## (undated) DEVICE — GLV 6.5 PROTEXIS (WHITE)

## (undated) DEVICE — BAG DECANTER DISP

## (undated) DEVICE — LUBRICATING JELLY HR ONE SHOT 3G

## (undated) DEVICE — SUT PROLENE 7-0 24" BV-1

## (undated) DEVICE — DRAIN JACKSON PRATT 10MM FLAT FULL NO TROCAR

## (undated) DEVICE — DRAPE 1/2 SHEET 40X57"

## (undated) DEVICE — FOLEY HOLDER STATLOCK 2 WAY ADULT

## (undated) DEVICE — GOWN XL

## (undated) DEVICE — SOL INJ NS 0.9% 500ML 2 PORT

## (undated) DEVICE — MEDICATION LABELS W MARKER

## (undated) DEVICE — ELCTR BOVIE PENCIL BLADE 10FT

## (undated) DEVICE — WARMING BLANKET FULL ADULT

## (undated) DEVICE — SUT VICRYL 2-0 27" CT-1 UNDYED

## (undated) DEVICE — STAPLER SKIN MULTI DIRECTION W35

## (undated) DEVICE — DRSG TEGADERM 6"X8"

## (undated) DEVICE — PACK PERIPHERAL VASC

## (undated) DEVICE — GLV 7.5 PROTEXIS (CREAM) MICRO

## (undated) DEVICE — SUT SILK 4-0 17-18"

## (undated) DEVICE — SUT PROLENE 4-0 36" SH

## (undated) DEVICE — LINE BREATHE SAMPLNG

## (undated) DEVICE — TOURNIQUET SET SURE-SNARE 22FR (2 TUBES, 2 UMBILICAL TAPES, 2 PLASTIC SNARES) 5"

## (undated) DEVICE — DRAPE MAYO STAND 30"

## (undated) DEVICE — STAPLER SKIN VISI-STAT 35 WIDE

## (undated) DEVICE — CATH IV SAFE BC 22G X 1" (BLUE)

## (undated) DEVICE — PREP CHLORAPREP HI-LITE ORANGE 26ML

## (undated) DEVICE — CONTAINER FORMALIN 80ML YELLOW

## (undated) DEVICE — POSITIONER STRAP ARMBOARD VELCRO TS-30

## (undated) DEVICE — SOL IRR BAG NS 0.9% 3000ML

## (undated) DEVICE — DRAPE ISOLATION BAG 20X20"

## (undated) DEVICE — LAP PAD 4 X 18"

## (undated) DEVICE — GLV 6 PROTEXIS (WHITE)

## (undated) DEVICE — BLADE SCALPEL SAFETYLOCK #10

## (undated) DEVICE — BIOPSY FORCEP COLD DISP

## (undated) DEVICE — SNARE POLYP MINI ACCUSNR 1.5 X 3CM

## (undated) DEVICE — DRAIN RESERVOIR FOR JACKSON PRATT 100CC CARDINAL

## (undated) DEVICE — TUBING MEDI-VAC W MAXIGRIP CONNECTORS 1/4"X6'

## (undated) DEVICE — PREP BETADINE SPONGE STICKS

## (undated) DEVICE — FOLEY TRAY 16FR 5CC LTX UMETER CLOSED

## (undated) DEVICE — DRSG VAC GRANUFOAM SMALL (BLACK)

## (undated) DEVICE — SUT PROLENE 6-0 30" C-1

## (undated) DEVICE — SYR LUER LOK 50CC

## (undated) DEVICE — SUT VICRYL 3-0 27" SH UNDYED

## (undated) DEVICE — DRAPE INSTRUMENT POUCH 6.75" X 11"

## (undated) DEVICE — CONTAINER FORMALIN 10% 20ML